# Patient Record
Sex: MALE | Race: BLACK OR AFRICAN AMERICAN | NOT HISPANIC OR LATINO | Employment: OTHER | URBAN - METROPOLITAN AREA
[De-identification: names, ages, dates, MRNs, and addresses within clinical notes are randomized per-mention and may not be internally consistent; named-entity substitution may affect disease eponyms.]

---

## 2017-01-06 DIAGNOSIS — I27.29 PULMONARY HYPERTENSIVE VENOUS DISEASE: Primary | ICD-10-CM

## 2017-01-09 ENCOUNTER — ANTI-COAG VISIT (OUTPATIENT)
Dept: CARDIOLOGY | Facility: CLINIC | Age: 42
End: 2017-01-09

## 2017-01-09 ENCOUNTER — INFUSION (OUTPATIENT)
Dept: CARDIOLOGY | Facility: HOSPITAL | Age: 42
End: 2017-01-09
Attending: INTERNAL MEDICINE
Payer: MEDICARE

## 2017-01-09 VITALS
DIASTOLIC BLOOD PRESSURE: 88 MMHG | HEART RATE: 89 BPM | BODY MASS INDEX: 48.5 KG/M2 | SYSTOLIC BLOOD PRESSURE: 129 MMHG | WEIGHT: 291.44 LBS | RESPIRATION RATE: 39 BRPM

## 2017-01-09 DIAGNOSIS — I27.9 CHRONIC CARDIOPULMONARY DISEASE: Primary | ICD-10-CM

## 2017-01-09 DIAGNOSIS — Z79.01 LONG TERM CURRENT USE OF ANTICOAGULANT THERAPY: ICD-10-CM

## 2017-01-09 LAB
ALBUMIN SERPL BCP-MCNC: 3 G/DL
ALP SERPL-CCNC: 97 U/L
ALT SERPL W/O P-5'-P-CCNC: 15 U/L
ANION GAP SERPL CALC-SCNC: 10 MMOL/L
AST SERPL-CCNC: 25 U/L
BILIRUB DIRECT SERPL-MCNC: 0.1 MG/DL
BILIRUB SERPL-MCNC: 0.4 MG/DL
BNP SERPL-MCNC: <10 PG/ML
BUN SERPL-MCNC: 24 MG/DL
CALCIUM SERPL-MCNC: 8.9 MG/DL
CHLORIDE SERPL-SCNC: 96 MMOL/L
CO2 SERPL-SCNC: 34 MMOL/L
CREAT SERPL-MCNC: 1.2 MG/DL
EST. GFR  (AFRICAN AMERICAN): >60 ML/MIN/1.73 M^2
EST. GFR  (NON AFRICAN AMERICAN): >60 ML/MIN/1.73 M^2
GLUCOSE SERPL-MCNC: 116 MG/DL
INR PPP: 2.7
MAGNESIUM SERPL-MCNC: 1.5 MG/DL
POTASSIUM SERPL-SCNC: 3.6 MMOL/L
PROT SERPL-MCNC: 7.9 G/DL
PROTHROMBIN TIME: 27.1 SEC
SODIUM SERPL-SCNC: 140 MMOL/L

## 2017-01-09 PROCEDURE — 36415 COLL VENOUS BLD VENIPUNCTURE: CPT

## 2017-01-09 PROCEDURE — 63600175 PHARM REV CODE 636 W HCPCS: Performed by: PHYSICIAN ASSISTANT

## 2017-01-09 PROCEDURE — 83735 ASSAY OF MAGNESIUM: CPT

## 2017-01-09 PROCEDURE — 25000003 PHARM REV CODE 250: Performed by: NURSE PRACTITIONER

## 2017-01-09 PROCEDURE — 63600175 PHARM REV CODE 636 W HCPCS: Performed by: NURSE PRACTITIONER

## 2017-01-09 PROCEDURE — 99213 OFFICE O/P EST LOW 20 MIN: CPT | Mod: ,,, | Performed by: INTERNAL MEDICINE

## 2017-01-09 PROCEDURE — 96365 THER/PROPH/DIAG IV INF INIT: CPT

## 2017-01-09 PROCEDURE — 80076 HEPATIC FUNCTION PANEL: CPT

## 2017-01-09 PROCEDURE — 96376 TX/PRO/DX INJ SAME DRUG ADON: CPT

## 2017-01-09 PROCEDURE — 85610 PROTHROMBIN TIME: CPT

## 2017-01-09 PROCEDURE — 25000003 PHARM REV CODE 250: Performed by: PHYSICIAN ASSISTANT

## 2017-01-09 PROCEDURE — 96366 THER/PROPH/DIAG IV INF ADDON: CPT

## 2017-01-09 PROCEDURE — 83880 ASSAY OF NATRIURETIC PEPTIDE: CPT

## 2017-01-09 PROCEDURE — 80048 BASIC METABOLIC PNL TOTAL CA: CPT

## 2017-01-09 RX ORDER — FUROSEMIDE 10 MG/ML
80 INJECTION INTRAMUSCULAR; INTRAVENOUS ONCE
Status: COMPLETED | OUTPATIENT
Start: 2017-01-09 | End: 2017-01-09

## 2017-01-09 RX ORDER — LANOLIN ALCOHOL/MO/W.PET/CERES
800 CREAM (GRAM) TOPICAL ONCE
Status: COMPLETED | OUTPATIENT
Start: 2017-01-09 | End: 2017-01-09

## 2017-01-09 RX ORDER — POTASSIUM CHLORIDE 750 MG/1
40 TABLET, EXTENDED RELEASE ORAL
Status: COMPLETED | OUTPATIENT
Start: 2017-01-09 | End: 2017-01-09

## 2017-01-09 RX ADMIN — MAGNESIUM OXIDE TAB 400 MG (241.3 MG ELEMENTAL MG) 800 MG: 400 (241.3 MG) TAB at 01:01

## 2017-01-09 RX ADMIN — FUROSEMIDE 20 MG/HR: 10 INJECTION, SOLUTION INTRAMUSCULAR; INTRAVENOUS at 08:01

## 2017-01-09 RX ADMIN — POTASSIUM CHLORIDE 40 MEQ: 750 TABLET, EXTENDED RELEASE ORAL at 12:01

## 2017-01-09 RX ADMIN — FUROSEMIDE 80 MG: 10 INJECTION, SOLUTION INTRAMUSCULAR; INTRAVENOUS at 08:01

## 2017-01-09 NOTE — PROGRESS NOTES
Discharge home,vss,neuro intact.Denies any acute distress at this time.See care plan note.Left floor ambulating in stable condition.Return in 2 weeks.Nadn.

## 2017-01-09 NOTE — PLAN OF CARE
Problem: Heart Failure (Adult)  Goal: Signs and Symptoms of Listed Potential Problems Will be Absent or Manageable (Heart Failure)  Signs and symptoms of listed potential problems will be absent or manageable by discharge/transition of care (reference Heart Failure (Adult) CPG).   Outcome: Ongoing (interventions implemented as appropriate)  Arrived in stable condition and voiced no concerns..Wt up by 2.6 kg since last visit on 12/12/16.Notified ALEXANDRE Adams NP of am labs.Supplement with Mag ox 800 mg and Kcl 40 mEq po x 1 dose.Tolerated infusion well.Urine out total 2775 ml.Instructed to add foods rich in potassium to diet.Verbalized he will eat a bake potato today.Return in 2 weeks

## 2017-01-20 DIAGNOSIS — I27.29 PULMONARY HYPERTENSIVE VENOUS DISEASE: Primary | ICD-10-CM

## 2017-01-23 ENCOUNTER — INFUSION (OUTPATIENT)
Dept: CARDIOLOGY | Facility: HOSPITAL | Age: 42
End: 2017-01-23
Attending: INTERNAL MEDICINE
Payer: MEDICARE

## 2017-01-23 VITALS
BODY MASS INDEX: 48.65 KG/M2 | DIASTOLIC BLOOD PRESSURE: 62 MMHG | SYSTOLIC BLOOD PRESSURE: 108 MMHG | WEIGHT: 292.31 LBS | RESPIRATION RATE: 21 BRPM | HEART RATE: 80 BPM | TEMPERATURE: 98 F | OXYGEN SATURATION: 97 %

## 2017-01-23 DIAGNOSIS — I27.9 CHRONIC CARDIOPULMONARY DISEASE: ICD-10-CM

## 2017-01-23 DIAGNOSIS — I27.9 CHRONIC PULMONARY HEART DISEASE: ICD-10-CM

## 2017-01-23 DIAGNOSIS — I27.0 PRIMARY PULMONARY HYPERTENSION: ICD-10-CM

## 2017-01-23 DIAGNOSIS — I27.29 PULMONARY HYPERTENSIVE VENOUS DISEASE: ICD-10-CM

## 2017-01-23 LAB
ANION GAP SERPL CALC-SCNC: 5 MMOL/L
BNP SERPL-MCNC: <10 PG/ML
BUN SERPL-MCNC: 28 MG/DL
CALCIUM SERPL-MCNC: 9.3 MG/DL
CHLORIDE SERPL-SCNC: 97 MMOL/L
CO2 SERPL-SCNC: 35 MMOL/L
CREAT SERPL-MCNC: 1.2 MG/DL
EST. GFR  (AFRICAN AMERICAN): >60 ML/MIN/1.73 M^2
EST. GFR  (NON AFRICAN AMERICAN): >60 ML/MIN/1.73 M^2
GLUCOSE SERPL-MCNC: 99 MG/DL
MAGNESIUM SERPL-MCNC: 2 MG/DL
POTASSIUM SERPL-SCNC: 4 MMOL/L
SODIUM SERPL-SCNC: 137 MMOL/L

## 2017-01-23 PROCEDURE — 80048 BASIC METABOLIC PNL TOTAL CA: CPT

## 2017-01-23 PROCEDURE — 96366 THER/PROPH/DIAG IV INF ADDON: CPT

## 2017-01-23 PROCEDURE — 63600175 PHARM REV CODE 636 W HCPCS

## 2017-01-23 PROCEDURE — 83735 ASSAY OF MAGNESIUM: CPT

## 2017-01-23 PROCEDURE — 96376 TX/PRO/DX INJ SAME DRUG ADON: CPT

## 2017-01-23 PROCEDURE — 36415 COLL VENOUS BLD VENIPUNCTURE: CPT

## 2017-01-23 PROCEDURE — 63600175 PHARM REV CODE 636 W HCPCS: Performed by: PHYSICIAN ASSISTANT

## 2017-01-23 PROCEDURE — 99214 OFFICE O/P EST MOD 30 MIN: CPT | Mod: ,,, | Performed by: INTERNAL MEDICINE

## 2017-01-23 PROCEDURE — 83880 ASSAY OF NATRIURETIC PEPTIDE: CPT

## 2017-01-23 PROCEDURE — 25000003 PHARM REV CODE 250

## 2017-01-23 PROCEDURE — 96365 THER/PROPH/DIAG IV INF INIT: CPT

## 2017-01-23 PROCEDURE — 99499 UNLISTED E&M SERVICE: CPT | Mod: ,,, | Performed by: PHYSICIAN ASSISTANT

## 2017-01-23 RX ORDER — FUROSEMIDE 10 MG/ML
80 INJECTION INTRAMUSCULAR; INTRAVENOUS ONCE
Status: COMPLETED | OUTPATIENT
Start: 2017-01-23 | End: 2017-01-23

## 2017-01-23 RX ADMIN — FUROSEMIDE 80 MG: 10 INJECTION, SOLUTION INTRAMUSCULAR; INTRAVENOUS at 08:01

## 2017-01-23 RX ADMIN — FUROSEMIDE: 10 INJECTION, SOLUTION INTRAMUSCULAR; INTRAVENOUS at 08:01

## 2017-01-23 NOTE — PROGRESS NOTES
Subjective:    Patient ID:  Yong Mcintyre is a 41 y.o. male who presents for follow-up of No chief complaint on file.      Congestive Heart Failure   Associated symptoms include shortness of breath.    36 yo AAM presents for biweekly outpatient IV diuretic tx. Weight is up today but Feeling well.         Review of Systems   Constitution: Positive for malaise/fatigue and weight gain.   HENT: Negative.    Eyes: Negative.    Cardiovascular: Positive for dyspnea on exertion and leg swelling.   Respiratory: Positive for shortness of breath and wheezing.    Endocrine: Negative.    Hematologic/Lymphatic: Negative.    Skin: Negative.    Musculoskeletal: Positive for arthritis and joint pain.   Gastrointestinal: Negative.    Genitourinary: Negative.    Neurological: Negative.    Psychiatric/Behavioral: Negative.    Allergic/Immunologic: Negative.         Objective:    Physical Exam   Constitutional: He is oriented to person, place, and time. He appears well-developed and well-nourished.   HENT:   Head: Normocephalic and atraumatic.   Eyes: Conjunctivae are normal. Pupils are equal, round, and reactive to light.   Neck: Normal range of motion. Neck supple. No thyromegaly present.   JVP mid neck   Cardiovascular: Normal rate, regular rhythm and normal heart sounds.    Pulmonary/Chest: Effort normal and breath sounds normal.   Abdominal: Soft. Bowel sounds are normal.   Musculoskeletal: He exhibits edema.   Neurological: He is alert and oriented to person, place, and time.   Skin: Skin is warm and dry.   Psychiatric: He has a normal mood and affect. His behavior is normal. Judgment and thought content normal.         Assessment:       1. Pulmonary hypertensive venous disease    2. Chronic pulmonary heart disease    3. Primary pulmonary hypertension    4. Chronic cardiopulmonary disease         Plan:       Lasix 80 mg IVP, then 20 mg/hr X 6 hours.  Followed by Dr. Head at [facility]  Continue biweekly outpatient  infusions.

## 2017-01-23 NOTE — PLAN OF CARE
Problem: Heart Failure (Adult)  Goal: Signs and Symptoms of Listed Potential Problems Will be Absent or Manageable (Heart Failure)  Signs and symptoms of listed potential problems will be absent or manageable by discharge/transition of care (reference Heart Failure (Adult) CPG).   Outcome: Ongoing (interventions implemented as appropriate)  Arrived in stable condition.Voices no concerns today.Orders given per KATHLEEN BROWN and carried out.Laxis 80 mg ivp followed by lasix drip @20 cc/hr x 6 hours.Notified PA of am labs and no pm labs ordered.Tolerated infusion well.Urine output total 1925 ml.Reported he has been monitoring closely his po intake (fluid/food) and  will start up with the gym today.Return in 2 weeks.

## 2017-01-23 NOTE — PROGRESS NOTES
Discharge home,vss,neuro intact.Denies any acute distress at this time.See care plan noted.Left floor ambulating in stable condition.Return in 2 weeks.Nadn.

## 2017-01-30 RX ORDER — WARFARIN SODIUM 5 MG/1
TABLET ORAL
Qty: 180 TABLET | Refills: 6 | Status: SHIPPED | OUTPATIENT
Start: 2017-01-30 | End: 2017-12-11 | Stop reason: SDUPTHER

## 2017-02-03 DIAGNOSIS — I27.29 PULMONARY HYPERTENSIVE VENOUS DISEASE: Primary | ICD-10-CM

## 2017-02-10 NOTE — PROGRESS NOTES
Subjective:    Patient ID:  Yong Mcintyre is a 41 y.o. male who presents for follow-up of No chief complaint on file.      Congestive Heart Failure   Associated symptoms include shortness of breath.   36 yo AAM presents for biweekly outpatient IV diuretic tx.  Feeling well without new c/o's.       Review of Systems   Constitution: Positive for malaise/fatigue and weight gain.   HENT: Negative.    Eyes: Negative.    Cardiovascular: Positive for dyspnea on exertion and leg swelling.   Respiratory: Positive for shortness of breath and wheezing.    Endocrine: Negative.    Hematologic/Lymphatic: Negative.    Skin: Negative.    Musculoskeletal: Positive for arthritis and joint pain.   Gastrointestinal: Negative.    Genitourinary: Negative.    Neurological: Negative.    Psychiatric/Behavioral: Negative.    Allergic/Immunologic: Negative.         Objective:    Physical Exam   Constitutional: He is oriented to person, place, and time. He appears well-developed and well-nourished.   HENT:   Head: Normocephalic and atraumatic.   Eyes: Conjunctivae are normal. Pupils are equal, round, and reactive to light.   Neck: Normal range of motion. Neck supple. No thyromegaly present.   JVP mid neck   Cardiovascular: Normal rate, regular rhythm and normal heart sounds.    Pulmonary/Chest: Effort normal and breath sounds normal.   Abdominal: Soft. Bowel sounds are normal.   Musculoskeletal: He exhibits edema.   Neurological: He is alert and oriented to person, place, and time.   Skin: Skin is warm and dry.   Psychiatric: He has a normal mood and affect. His behavior is normal. Judgment and thought content normal.         Assessment:       1. Chronic cardiopulmonary disease         Plan:       Lasix 80 mg IVP, then 20 mg/hr X 6 hours.  Followed by Dr. Head at [facility]  Continue biweekly outpatient infusions.

## 2017-02-17 DIAGNOSIS — I27.29 PULMONARY HYPERTENSIVE VENOUS DISEASE: Primary | ICD-10-CM

## 2017-02-20 ENCOUNTER — ANTI-COAG VISIT (OUTPATIENT)
Dept: CARDIOLOGY | Facility: CLINIC | Age: 42
End: 2017-02-20

## 2017-02-20 ENCOUNTER — INFUSION (OUTPATIENT)
Dept: CARDIOLOGY | Facility: HOSPITAL | Age: 42
End: 2017-02-20
Attending: INTERNAL MEDICINE
Payer: MEDICARE

## 2017-02-20 VITALS
HEART RATE: 73 BPM | OXYGEN SATURATION: 96 % | WEIGHT: 292.31 LBS | DIASTOLIC BLOOD PRESSURE: 73 MMHG | RESPIRATION RATE: 19 BRPM | TEMPERATURE: 98 F | SYSTOLIC BLOOD PRESSURE: 115 MMHG | BODY MASS INDEX: 48.65 KG/M2

## 2017-02-20 DIAGNOSIS — I27.9: ICD-10-CM

## 2017-02-20 DIAGNOSIS — Z79.01 LONG TERM (CURRENT) USE OF ANTICOAGULANTS: ICD-10-CM

## 2017-02-20 DIAGNOSIS — I27.0 PRIMARY PULMONARY HYPERTENSION: ICD-10-CM

## 2017-02-20 DIAGNOSIS — I27.9 CHRONIC PULMONARY HEART DISEASE: Primary | ICD-10-CM

## 2017-02-20 DIAGNOSIS — I27.9 CHRONIC CARDIOPULMONARY DISEASE: ICD-10-CM

## 2017-02-20 DIAGNOSIS — Z79.01 LONG TERM CURRENT USE OF ANTICOAGULANT THERAPY: ICD-10-CM

## 2017-02-20 LAB
ALBUMIN SERPL BCP-MCNC: 3.4 G/DL
ALP SERPL-CCNC: 124 U/L
ALT SERPL W/O P-5'-P-CCNC: 22 U/L
ANION GAP SERPL CALC-SCNC: 8 MMOL/L
AST SERPL-CCNC: 35 U/L
BILIRUB DIRECT SERPL-MCNC: 0.2 MG/DL
BILIRUB SERPL-MCNC: 0.7 MG/DL
BNP SERPL-MCNC: <10 PG/ML
BUN SERPL-MCNC: 21 MG/DL
CALCIUM SERPL-MCNC: 9.1 MG/DL
CHLORIDE SERPL-SCNC: 99 MMOL/L
CO2 SERPL-SCNC: 31 MMOL/L
CREAT SERPL-MCNC: 1.1 MG/DL
EST. GFR  (AFRICAN AMERICAN): >60 ML/MIN/1.73 M^2
EST. GFR  (NON AFRICAN AMERICAN): >60 ML/MIN/1.73 M^2
GLUCOSE SERPL-MCNC: 94 MG/DL
INR PPP: 1.7
MAGNESIUM SERPL-MCNC: 1.7 MG/DL
POTASSIUM SERPL-SCNC: 3.9 MMOL/L
PROT SERPL-MCNC: 8.7 G/DL
PROTHROMBIN TIME: 16.9 SEC
SODIUM SERPL-SCNC: 138 MMOL/L

## 2017-02-20 PROCEDURE — 63600175 PHARM REV CODE 636 W HCPCS: Performed by: NURSE PRACTITIONER

## 2017-02-20 PROCEDURE — 80076 HEPATIC FUNCTION PANEL: CPT

## 2017-02-20 PROCEDURE — 96376 TX/PRO/DX INJ SAME DRUG ADON: CPT

## 2017-02-20 PROCEDURE — 83735 ASSAY OF MAGNESIUM: CPT

## 2017-02-20 PROCEDURE — 96365 THER/PROPH/DIAG IV INF INIT: CPT

## 2017-02-20 PROCEDURE — 85610 PROTHROMBIN TIME: CPT

## 2017-02-20 PROCEDURE — 80048 BASIC METABOLIC PNL TOTAL CA: CPT

## 2017-02-20 PROCEDURE — 83880 ASSAY OF NATRIURETIC PEPTIDE: CPT

## 2017-02-20 PROCEDURE — 25000003 PHARM REV CODE 250: Performed by: NURSE PRACTITIONER

## 2017-02-20 PROCEDURE — 96366 THER/PROPH/DIAG IV INF ADDON: CPT

## 2017-02-20 PROCEDURE — 25000003 PHARM REV CODE 250: Performed by: PHYSICIAN ASSISTANT

## 2017-02-20 PROCEDURE — 99499 UNLISTED E&M SERVICE: CPT | Mod: ,,, | Performed by: NURSE PRACTITIONER

## 2017-02-20 PROCEDURE — 63600175 PHARM REV CODE 636 W HCPCS: Performed by: PHYSICIAN ASSISTANT

## 2017-02-20 PROCEDURE — 99213 OFFICE O/P EST LOW 20 MIN: CPT | Mod: ,,, | Performed by: NURSE PRACTITIONER

## 2017-02-20 RX ORDER — LANOLIN ALCOHOL/MO/W.PET/CERES
400 CREAM (GRAM) TOPICAL ONCE
Status: COMPLETED | OUTPATIENT
Start: 2017-02-20 | End: 2017-02-20

## 2017-02-20 RX ORDER — FUROSEMIDE 10 MG/ML
80 INJECTION INTRAMUSCULAR; INTRAVENOUS ONCE
Status: COMPLETED | OUTPATIENT
Start: 2017-02-20 | End: 2017-02-20

## 2017-02-20 RX ADMIN — MAGNESIUM OXIDE TAB 400 MG (241.3 MG ELEMENTAL MG) 400 MG: 400 (241.3 MG) TAB at 11:02

## 2017-02-20 RX ADMIN — FUROSEMIDE 80 MG: 10 INJECTION, SOLUTION INTRAMUSCULAR; INTRAVENOUS at 08:02

## 2017-02-20 RX ADMIN — FUROSEMIDE 20 MG/HR: 10 INJECTION, SOLUTION INTRAMUSCULAR; INTRAVENOUS at 08:02

## 2017-02-20 NOTE — PLAN OF CARE
Problem: Heart Failure (Adult)  Goal: Signs and Symptoms of Listed Potential Problems Will be Absent or Manageable (Heart Failure)  Signs and symptoms of listed potential problems will be absent or manageable by discharge/transition of care (reference Heart Failure (Adult) CPG).   Outcome: Ongoing (interventions implemented as appropriate)  Tolerated infusion well.Urinary out total 2050 ml.Denies any acute distress at this time.Return in 2 weeks.

## 2017-02-20 NOTE — PROGRESS NOTES
Discharge hoe vss,neuro intac.Denies any acute distress at this time.See care plan notes.Left floor ambulating in stable condition Eye exams & tests to be performed upon return.Return in 2 weeks.

## 2017-02-20 NOTE — PROGRESS NOTES
"Subjective:    Patient ID:  Yong Mcintyre is a 41 y.o. male who presents for follow-up of No chief complaint on file.      Congestive Heart Failure   Associated symptoms include shortness of breath.    38 yo AAM presents for biweekly outpatient IV diuretic tx. States that he has been "in a funk" lately but is starting to get back to the gym.        Review of Systems   Constitution: Positive for malaise/fatigue and weight gain.   HENT: Negative.    Eyes: Negative.    Cardiovascular: Positive for dyspnea on exertion and leg swelling.   Respiratory: Positive for shortness of breath and wheezing.    Endocrine: Negative.    Hematologic/Lymphatic: Negative.    Skin: Negative.    Musculoskeletal: Positive for arthritis and joint pain.   Gastrointestinal: Negative.    Genitourinary: Negative.    Neurological: Negative.    Psychiatric/Behavioral: Negative.    Allergic/Immunologic: Negative.         Objective:    Physical Exam   Constitutional: He is oriented to person, place, and time. He appears well-developed and well-nourished.   HENT:   Head: Normocephalic and atraumatic.   Eyes: Conjunctivae are normal. Pupils are equal, round, and reactive to light.   Neck: Normal range of motion. Neck supple. No thyromegaly present.   JVP mid neck   Cardiovascular: Normal rate, regular rhythm and normal heart sounds.    Pulmonary/Chest: Effort normal and breath sounds normal.   Abdominal: Soft. Bowel sounds are normal.   Musculoskeletal: He exhibits edema.   Neurological: He is alert and oriented to person, place, and time.   Skin: Skin is warm and dry.   Psychiatric: He has a normal mood and affect. His behavior is normal. Judgment and thought content normal.         Assessment:       1. Long term (current) use of anticoagulants    2. Chronic pulmonary heart disease    3. Primary pulmonary hypertension    4. Chronic cardiopulmonary disease         Plan:       Lasix 80 mg IVP, then 20 mg/hr X 6 hours.  Followed by Dr. Head " at [facility]  Continue biweekly outpatient infusions.

## 2017-02-20 NOTE — PLAN OF CARE
Problem: Heart Failure (Adult)  Goal: Signs and Symptoms of Listed Potential Problems Will be Absent or Manageable (Heart Failure)  Signs and symptoms of listed potential problems will be absent or manageable by discharge/transition of care (reference Heart Failure (Adult) CPG).   Outcome: Ongoing (interventions implemented as appropriate)  Arrived in stable condition.missed last visit and wt up by 2.4 kg.S Bryan NP here and orders given and carried out.Lasix 80 mg ivp followed by Lasix drip @20 cc/hr X 6 hr.reported restarted the Gym program.Reported am labs to NP and orders given and carried out.Mag ox 400 mg x 1 dose.Denies any acute distress at this time.will continue to monitor

## 2017-03-03 DIAGNOSIS — I27.29 PULMONARY HYPERTENSIVE VENOUS DISEASE: Primary | ICD-10-CM

## 2017-03-17 DIAGNOSIS — I27.9: Primary | ICD-10-CM

## 2017-03-20 ENCOUNTER — INFUSION (OUTPATIENT)
Dept: CARDIOLOGY | Facility: HOSPITAL | Age: 42
End: 2017-03-20
Attending: INTERNAL MEDICINE
Payer: MEDICARE

## 2017-03-20 ENCOUNTER — ANTI-COAG VISIT (OUTPATIENT)
Dept: CARDIOLOGY | Facility: CLINIC | Age: 42
End: 2017-03-20

## 2017-03-20 VITALS
SYSTOLIC BLOOD PRESSURE: 121 MMHG | DIASTOLIC BLOOD PRESSURE: 62 MMHG | OXYGEN SATURATION: 93 % | RESPIRATION RATE: 19 BRPM | WEIGHT: 309.75 LBS | BODY MASS INDEX: 51.54 KG/M2 | TEMPERATURE: 99 F | HEART RATE: 87 BPM

## 2017-03-20 DIAGNOSIS — Z79.01 LONG TERM CURRENT USE OF ANTICOAGULANT THERAPY: ICD-10-CM

## 2017-03-20 DIAGNOSIS — I27.20 PULMONARY HYPERTENSION: Primary | ICD-10-CM

## 2017-03-20 DIAGNOSIS — I27.9 CHRONIC CARDIOPULMONARY DISEASE: ICD-10-CM

## 2017-03-20 DIAGNOSIS — I27.9: ICD-10-CM

## 2017-03-20 DIAGNOSIS — Z79.01 LONG TERM (CURRENT) USE OF ANTICOAGULANTS: ICD-10-CM

## 2017-03-20 DIAGNOSIS — I27.81 COR PULMONALE: ICD-10-CM

## 2017-03-20 DIAGNOSIS — I27.9 CHRONIC PULMONARY HEART DISEASE: ICD-10-CM

## 2017-03-20 DIAGNOSIS — I27.0 PRIMARY PULMONARY HYPERTENSION: ICD-10-CM

## 2017-03-20 LAB
ALBUMIN SERPL BCP-MCNC: 2.9 G/DL
ALP SERPL-CCNC: 122 U/L
ALT SERPL W/O P-5'-P-CCNC: 20 U/L
ANION GAP SERPL CALC-SCNC: 10 MMOL/L
ANION GAP SERPL CALC-SCNC: 10 MMOL/L
AST SERPL-CCNC: 27 U/L
BILIRUB DIRECT SERPL-MCNC: 0.2 MG/DL
BILIRUB SERPL-MCNC: 0.4 MG/DL
BNP SERPL-MCNC: 10 PG/ML
BUN SERPL-MCNC: 18 MG/DL
BUN SERPL-MCNC: 18 MG/DL
CALCIUM SERPL-MCNC: 8.8 MG/DL
CALCIUM SERPL-MCNC: 8.9 MG/DL
CHLORIDE SERPL-SCNC: 97 MMOL/L
CHLORIDE SERPL-SCNC: 99 MMOL/L
CO2 SERPL-SCNC: 31 MMOL/L
CO2 SERPL-SCNC: 32 MMOL/L
CREAT SERPL-MCNC: 1 MG/DL
CREAT SERPL-MCNC: 1.2 MG/DL
EST. GFR  (AFRICAN AMERICAN): >60 ML/MIN/1.73 M^2
EST. GFR  (AFRICAN AMERICAN): >60 ML/MIN/1.73 M^2
EST. GFR  (NON AFRICAN AMERICAN): >60 ML/MIN/1.73 M^2
EST. GFR  (NON AFRICAN AMERICAN): >60 ML/MIN/1.73 M^2
GLUCOSE SERPL-MCNC: 100 MG/DL
GLUCOSE SERPL-MCNC: 106 MG/DL
INR PPP: 2.1
MAGNESIUM SERPL-MCNC: 1.7 MG/DL
MAGNESIUM SERPL-MCNC: 1.7 MG/DL
POTASSIUM SERPL-SCNC: 3.7 MMOL/L
POTASSIUM SERPL-SCNC: 3.8 MMOL/L
PROT SERPL-MCNC: 7.9 G/DL
PROTHROMBIN TIME: 21.5 SEC
SODIUM SERPL-SCNC: 139 MMOL/L
SODIUM SERPL-SCNC: 140 MMOL/L

## 2017-03-20 PROCEDURE — 99214 OFFICE O/P EST MOD 30 MIN: CPT | Mod: ,,, | Performed by: INTERNAL MEDICINE

## 2017-03-20 PROCEDURE — 80048 BASIC METABOLIC PNL TOTAL CA: CPT

## 2017-03-20 PROCEDURE — 83880 ASSAY OF NATRIURETIC PEPTIDE: CPT

## 2017-03-20 PROCEDURE — 99499 UNLISTED E&M SERVICE: CPT | Mod: ,,, | Performed by: PHYSICIAN ASSISTANT

## 2017-03-20 PROCEDURE — 96376 TX/PRO/DX INJ SAME DRUG ADON: CPT

## 2017-03-20 PROCEDURE — 80076 HEPATIC FUNCTION PANEL: CPT

## 2017-03-20 PROCEDURE — 63600175 PHARM REV CODE 636 W HCPCS: Performed by: NURSE PRACTITIONER

## 2017-03-20 PROCEDURE — 96365 THER/PROPH/DIAG IV INF INIT: CPT

## 2017-03-20 PROCEDURE — 63600175 PHARM REV CODE 636 W HCPCS: Performed by: PHYSICIAN ASSISTANT

## 2017-03-20 PROCEDURE — 96366 THER/PROPH/DIAG IV INF ADDON: CPT

## 2017-03-20 PROCEDURE — 83735 ASSAY OF MAGNESIUM: CPT | Mod: 91

## 2017-03-20 PROCEDURE — 25000003 PHARM REV CODE 250: Performed by: NURSE PRACTITIONER

## 2017-03-20 PROCEDURE — 85610 PROTHROMBIN TIME: CPT

## 2017-03-20 RX ORDER — FUROSEMIDE 10 MG/ML
80 INJECTION INTRAMUSCULAR; INTRAVENOUS ONCE
Status: COMPLETED | OUTPATIENT
Start: 2017-03-20 | End: 2017-03-20

## 2017-03-20 RX ADMIN — FUROSEMIDE 80 MG: 10 INJECTION, SOLUTION INTRAMUSCULAR; INTRAVENOUS at 08:03

## 2017-03-20 RX ADMIN — FUROSEMIDE 20 MG/HR: 10 INJECTION, SOLUTION INTRAMUSCULAR; INTRAVENOUS at 08:03

## 2017-03-20 NOTE — PLAN OF CARE
Problem: Heart Failure (Adult)  Goal: Signs and Symptoms of Listed Potential Problems Will be Absent or Manageable (Heart Failure)  Signs and symptoms of listed potential problems will be absent or manageable by discharge/transition of care (reference Heart Failure (Adult) CPG).   Outcome: Ongoing (interventions implemented as appropriate)  Pt returned call and stated he will eat a banana and take mag ox 500 mg.

## 2017-03-20 NOTE — PLAN OF CARE
Problem: Heart Failure (Adult)  Goal: Signs and Symptoms of Listed Potential Problems Will be Absent or Manageable (Heart Failure)  Signs and symptoms of listed potential problems will be absent or manageable by discharge/transition of care (reference Heart Failure (Adult) CPG).   Outcome: Ongoing (interventions implemented as appropriate)  Missed last 2 visits.Arrived ambulating in stable condition.Reported having sinus drip,headaches,generalized arching and  low grade temp over 99.0-100.8  For about 1 week.Started feeling better over the weekend.He also reported doing spring cleaning over weekend.Orders given per D Rolling PA and carried out.Lasix 80 mg ivp followed by lasix drip @20 cc/hr x 6 hrs.PA here and notified of am labs and urine  output.Evening labs completed and pt left before labs are verified.Notified PA with evening labs and instructed to add foods rich in potassium and take extra  home dose of Mag ox 400 mg.Orders carried out by leaving  message on cell phone @[phone].Also instructed to call [facility] with any question.Return in 2 weeks.

## 2017-03-20 NOTE — PROGRESS NOTES
Subjective:    Patient ID:  Yong Mcintyre is a 41 y.o. male who presents for follow-up of CHF.      HPI  36 yo AAM presents for biweekly outpatient IV diuretic tx. Pt was congested and felt like his allergies were acting up last week. He had some chills and a low grade fever a few days ago, 99.8. This is better and today he just feels fatigued. No worsening SOB, GOMEZ.    Review of Systems   Constitution: Positive for malaise/fatigue and weight gain.   Cardiovascular: Positive for dyspnea on exertion and leg swelling.   Respiratory: Positive for shortness of breath. Negative for cough.    Gastrointestinal: Negative for nausea.   Neurological: Negative for dizziness and light-headedness.        Objective:    Physical Exam   Constitutional: He is oriented to person, place, and time. He appears well-developed and well-nourished.   /62 (BP Location: Right arm, Patient Position: Sitting, BP Method: Automatic)  Pulse 87  Temp 98.5 °F (36.9 °C) (Oral)   Resp 19  Wt (!) 140.5 kg (309 lb 11.9 oz)  SpO2 (!) 93%  BMI 51.54 kg/m2     Neck: No JVD present.   Cardiovascular: Normal rate, regular rhythm and normal heart sounds.    Pulses:       Radial pulses are 2+ on the right side, and 2+ on the left side.   Pulmonary/Chest: Effort normal and breath sounds normal.   Abdominal: Soft. Bowel sounds are normal. There is no tenderness.   Musculoskeletal: He exhibits no edema.   Neurological: He is alert and oriented to person, place, and time.   Skin: Skin is warm and dry.         Assessment:       1. Pulmonary hypertension    2. Chronic pulmonary heart disease    3. Primary pulmonary hypertension    4. Chronic cardiopulmonary disease    5. Cor pulmonale    6. Long term (current) use of anticoagulants         Plan:       Lasix 80 mg IVP, then 20 mg/hr X 6 hours.  Followed by Dr. Head at [facility]  Continue biweekly outpatient infusions.

## 2017-03-20 NOTE — PROGRESS NOTES
Discharge home in stable condition,vss,neuro intact.Denies any acute distress at this time.See care plan note.Refuses w/c and left floor ambulating.Return in 2 weeks.Huan

## 2017-03-31 DIAGNOSIS — I27.29 PULMONARY HYPERTENSIVE VENOUS DISEASE: Primary | ICD-10-CM

## 2017-04-13 DIAGNOSIS — I27.29 PULMONARY HYPERTENSIVE VENOUS DISEASE: Primary | ICD-10-CM

## 2017-04-17 ENCOUNTER — ANTI-COAG VISIT (OUTPATIENT)
Dept: CARDIOLOGY | Facility: CLINIC | Age: 42
End: 2017-04-17

## 2017-04-17 ENCOUNTER — INFUSION (OUTPATIENT)
Dept: CARDIOLOGY | Facility: HOSPITAL | Age: 42
End: 2017-04-17
Attending: INTERNAL MEDICINE
Payer: MEDICARE

## 2017-04-17 VITALS
RESPIRATION RATE: 21 BRPM | TEMPERATURE: 99 F | OXYGEN SATURATION: 96 % | HEART RATE: 74 BPM | SYSTOLIC BLOOD PRESSURE: 126 MMHG | DIASTOLIC BLOOD PRESSURE: 78 MMHG | WEIGHT: 309.31 LBS | BODY MASS INDEX: 51.47 KG/M2

## 2017-04-17 DIAGNOSIS — Z79.01 LONG TERM (CURRENT) USE OF ANTICOAGULANTS: ICD-10-CM

## 2017-04-17 DIAGNOSIS — I27.0 PRIMARY PULMONARY HYPERTENSION: ICD-10-CM

## 2017-04-17 DIAGNOSIS — G47.33 OSA (OBSTRUCTIVE SLEEP APNEA): ICD-10-CM

## 2017-04-17 DIAGNOSIS — I51.89 DIASTOLIC DYSFUNCTION: ICD-10-CM

## 2017-04-17 DIAGNOSIS — E66.2 PICKWICKIAN SYNDROME: ICD-10-CM

## 2017-04-17 DIAGNOSIS — I27.20 PULMONARY HYPERTENSION: Primary | ICD-10-CM

## 2017-04-17 DIAGNOSIS — Z79.01 LONG TERM CURRENT USE OF ANTICOAGULANT THERAPY: ICD-10-CM

## 2017-04-17 DIAGNOSIS — J96.20 ACUTE ON CHRONIC RESPIRATORY FAILURE, UNSPECIFIED WHETHER WITH HYPOXIA OR HYPERCAPNIA: ICD-10-CM

## 2017-04-17 DIAGNOSIS — Q21.12 PFO (PATENT FORAMEN OVALE): ICD-10-CM

## 2017-04-17 DIAGNOSIS — I27.9 CHRONIC PULMONARY HEART DISEASE: ICD-10-CM

## 2017-04-17 DIAGNOSIS — I48.0 PAROXYSMAL ATRIAL FIBRILLATION: ICD-10-CM

## 2017-04-17 DIAGNOSIS — E66.01 MORBID OBESITY DUE TO EXCESS CALORIES: ICD-10-CM

## 2017-04-17 DIAGNOSIS — I27.9 CHRONIC CARDIOPULMONARY DISEASE: ICD-10-CM

## 2017-04-17 LAB
ALBUMIN SERPL BCP-MCNC: 3.2 G/DL
ALP SERPL-CCNC: 132 U/L
ALT SERPL W/O P-5'-P-CCNC: 21 U/L
ANION GAP SERPL CALC-SCNC: 9 MMOL/L
AST SERPL-CCNC: 29 U/L
BILIRUB DIRECT SERPL-MCNC: 0.1 MG/DL
BILIRUB SERPL-MCNC: 0.3 MG/DL
BNP SERPL-MCNC: <10 PG/ML
BUN SERPL-MCNC: 26 MG/DL
CALCIUM SERPL-MCNC: 8.7 MG/DL
CHLORIDE SERPL-SCNC: 98 MMOL/L
CO2 SERPL-SCNC: 32 MMOL/L
CREAT SERPL-MCNC: 1.1 MG/DL
EST. GFR  (AFRICAN AMERICAN): >60 ML/MIN/1.73 M^2
EST. GFR  (NON AFRICAN AMERICAN): >60 ML/MIN/1.73 M^2
GLUCOSE SERPL-MCNC: 88 MG/DL
INR PPP: 2.2
MAGNESIUM SERPL-MCNC: 2.1 MG/DL
POTASSIUM SERPL-SCNC: 4.1 MMOL/L
PROT SERPL-MCNC: 8.6 G/DL
PROTHROMBIN TIME: 22.3 SEC
SODIUM SERPL-SCNC: 139 MMOL/L

## 2017-04-17 PROCEDURE — 63600175 PHARM REV CODE 636 W HCPCS: Performed by: PHYSICIAN ASSISTANT

## 2017-04-17 PROCEDURE — 83880 ASSAY OF NATRIURETIC PEPTIDE: CPT

## 2017-04-17 PROCEDURE — 96366 THER/PROPH/DIAG IV INF ADDON: CPT

## 2017-04-17 PROCEDURE — 80048 BASIC METABOLIC PNL TOTAL CA: CPT

## 2017-04-17 PROCEDURE — 99213 OFFICE O/P EST LOW 20 MIN: CPT | Mod: ,,, | Performed by: NURSE PRACTITIONER

## 2017-04-17 PROCEDURE — 96376 TX/PRO/DX INJ SAME DRUG ADON: CPT

## 2017-04-17 PROCEDURE — 63600175 PHARM REV CODE 636 W HCPCS: Performed by: NURSE PRACTITIONER

## 2017-04-17 PROCEDURE — 25000003 PHARM REV CODE 250: Performed by: PHYSICIAN ASSISTANT

## 2017-04-17 PROCEDURE — 83735 ASSAY OF MAGNESIUM: CPT

## 2017-04-17 PROCEDURE — 80076 HEPATIC FUNCTION PANEL: CPT

## 2017-04-17 PROCEDURE — 96365 THER/PROPH/DIAG IV INF INIT: CPT

## 2017-04-17 PROCEDURE — 99499 UNLISTED E&M SERVICE: CPT | Mod: ,,, | Performed by: NURSE PRACTITIONER

## 2017-04-17 PROCEDURE — 85610 PROTHROMBIN TIME: CPT

## 2017-04-17 RX ORDER — METOPROLOL TARTRATE 25 MG/1
25 TABLET, FILM COATED ORAL 2 TIMES DAILY
Qty: 180 TABLET | Refills: 3 | Status: SHIPPED | OUTPATIENT
Start: 2017-04-17 | End: 2018-06-25 | Stop reason: SDUPTHER

## 2017-04-17 RX ORDER — FUROSEMIDE 10 MG/ML
80 INJECTION INTRAMUSCULAR; INTRAVENOUS ONCE
Status: COMPLETED | OUTPATIENT
Start: 2017-04-17 | End: 2017-04-17

## 2017-04-17 RX ADMIN — FUROSEMIDE 20 MG/HR: 10 INJECTION, SOLUTION INTRAMUSCULAR; INTRAVENOUS at 08:04

## 2017-04-17 RX ADMIN — FUROSEMIDE 80 MG: 10 INJECTION, SOLUTION INTRAMUSCULAR; INTRAVENOUS at 08:04

## 2017-04-17 NOTE — PROGRESS NOTES
Subjective:    Patient ID:  Yong Mcintyre is a 41 y.o. male who presents for follow-up of Congestive Heart Failure      Congestive Heart Failure      40 yo AAM presents for biweekly outpatient IV diuretic tx. He remains on Lasix 80 mg bid, Tracleer and Revatio and is on home O2 at 3 LPM. Has gained ~ 80# over the last year 2/2 dietary indiscretion and not going to the gym. Sees Dr. Head at [facility] once a year.    Review of Systems   Constitution: Positive for malaise/fatigue and weight gain.   HENT: Negative.    Eyes: Negative.    Cardiovascular: Positive for dyspnea on exertion and leg swelling.   Respiratory: Positive for shortness of breath and wheezing.    Endocrine: Negative.    Hematologic/Lymphatic: Negative.    Skin: Negative.    Musculoskeletal: Positive for arthritis and joint pain.   Gastrointestinal: Negative.    Genitourinary: Negative.    Neurological: Negative.    Psychiatric/Behavioral: Negative.    Allergic/Immunologic: Negative.         Objective:    Physical Exam   Constitutional: He is oriented to person, place, and time. He appears well-developed and well-nourished.   HENT:   Head: Normocephalic and atraumatic.   Eyes: Conjunctivae are normal. Pupils are equal, round, and reactive to light.   Neck: Normal range of motion. Neck supple. No thyromegaly present.   JVP mid neck   Cardiovascular: Normal rate, regular rhythm and normal heart sounds.    Pulmonary/Chest: Effort normal and breath sounds normal.   Abdominal: Soft. Bowel sounds are normal.   Musculoskeletal: He exhibits edema.   Neurological: He is alert and oriented to person, place, and time.   Skin: Skin is warm and dry.   Psychiatric: He has a normal mood and affect. His behavior is normal. Judgment and thought content normal.         Assessment:       1. Pulmonary hypertension    2. Chronic pulmonary heart disease    3. Primary pulmonary hypertension    4. Chronic cardiopulmonary disease    5. Long term (current) use of  anticoagulants    6. Acute on chronic respiratory failure, unspecified whether with hypoxia or hypercapnia    7. PFO (patent foramen ovale)    8. Paroxysmal atrial fibrillation    9. MALLIKA (obstructive sleep apnea)    10. Pickwickian syndrome    11. Morbid obesity due to excess calories    12. Diastolic dysfunction         Plan:       Lasix 80 mg IVP, then 20 mg/hr X 6 hours.  Followed by Dr. Head at [facility].  Continue biweekly outpatient infusions.

## 2017-04-17 NOTE — PROGRESS NOTES
Discharge home,vss,neuro intact.Denies any acute distress at this time.See care plan note.Left floor ambulating in stable condition.Return in 2 weeks.

## 2017-04-17 NOTE — PLAN OF CARE
Problem: Heart Failure (Adult)  Goal: Signs and Symptoms of Listed Potential Problems Will be Absent or Manageable (Heart Failure)  Signs and symptoms of listed potential problems will be absent or manageable by discharge/transition of care (reference Heart Failure (Adult) CPG).   Outcome: Ongoing (interventions implemented as appropriate)  Arrived in stable condition.Denies any acute distress at this time.Missed last infusion and wt up 2.6 kg.Orders given per GRIFFIN Rooney NP and carried out.Lasix 80 mg ivp followed by lasix drip @20 cc/hr x 6 hours.Notified NP of am labs and no pm labs ordered.Feels like these infusions are help.He started back going to the gym and want to lose weight now.Tolerated infusion well.Urinary output 2625 cc with net -1947.Return in 2 weeks .

## 2017-04-27 RX ORDER — FLUTICASONE PROPIONATE AND SALMETEROL 50; 250 UG/1; UG/1
POWDER RESPIRATORY (INHALATION)
Qty: 60 EACH | Refills: 3 | Status: SHIPPED | OUTPATIENT
Start: 2017-04-27 | End: 2017-09-10 | Stop reason: SDUPTHER

## 2017-04-28 DIAGNOSIS — I27.29 PULMONARY HYPERTENSIVE VENOUS DISEASE: Primary | ICD-10-CM

## 2017-05-01 ENCOUNTER — INFUSION (OUTPATIENT)
Dept: CARDIOLOGY | Facility: HOSPITAL | Age: 42
End: 2017-05-01
Attending: INTERNAL MEDICINE
Payer: MEDICARE

## 2017-05-01 VITALS
DIASTOLIC BLOOD PRESSURE: 72 MMHG | BODY MASS INDEX: 51.29 KG/M2 | HEART RATE: 88 BPM | WEIGHT: 308.19 LBS | SYSTOLIC BLOOD PRESSURE: 116 MMHG | RESPIRATION RATE: 22 BRPM

## 2017-05-01 DIAGNOSIS — Z79.01 LONG TERM (CURRENT) USE OF ANTICOAGULANTS: ICD-10-CM

## 2017-05-01 DIAGNOSIS — I27.9 CHRONIC PULMONARY HEART DISEASE: ICD-10-CM

## 2017-05-01 DIAGNOSIS — I27.9 CHRONIC CARDIOPULMONARY DISEASE: ICD-10-CM

## 2017-05-01 DIAGNOSIS — I27.0 PRIMARY PULMONARY HYPERTENSION: ICD-10-CM

## 2017-05-01 LAB
ANION GAP SERPL CALC-SCNC: 11 MMOL/L
BNP SERPL-MCNC: <10 PG/ML
BUN SERPL-MCNC: 29 MG/DL
CALCIUM SERPL-MCNC: 9.5 MG/DL
CHLORIDE SERPL-SCNC: 94 MMOL/L
CO2 SERPL-SCNC: 32 MMOL/L
CREAT SERPL-MCNC: 1.2 MG/DL
EST. GFR  (AFRICAN AMERICAN): >60 ML/MIN/1.73 M^2
EST. GFR  (NON AFRICAN AMERICAN): >60 ML/MIN/1.73 M^2
GLUCOSE SERPL-MCNC: 94 MG/DL
INR PPP: 2.8
MAGNESIUM SERPL-MCNC: 1.7 MG/DL
POTASSIUM SERPL-SCNC: 3.5 MMOL/L
PROTHROMBIN TIME: 28.5 SEC
SODIUM SERPL-SCNC: 137 MMOL/L

## 2017-05-01 PROCEDURE — 96366 THER/PROPH/DIAG IV INF ADDON: CPT

## 2017-05-01 PROCEDURE — 99214 OFFICE O/P EST MOD 30 MIN: CPT | Mod: ,,, | Performed by: INTERNAL MEDICINE

## 2017-05-01 PROCEDURE — 36415 COLL VENOUS BLD VENIPUNCTURE: CPT

## 2017-05-01 PROCEDURE — 83880 ASSAY OF NATRIURETIC PEPTIDE: CPT

## 2017-05-01 PROCEDURE — 96376 TX/PRO/DX INJ SAME DRUG ADON: CPT

## 2017-05-01 PROCEDURE — 83735 ASSAY OF MAGNESIUM: CPT

## 2017-05-01 PROCEDURE — 25000003 PHARM REV CODE 250: Performed by: NURSE PRACTITIONER

## 2017-05-01 PROCEDURE — 80048 BASIC METABOLIC PNL TOTAL CA: CPT

## 2017-05-01 PROCEDURE — 85610 PROTHROMBIN TIME: CPT

## 2017-05-01 PROCEDURE — 63600175 PHARM REV CODE 636 W HCPCS: Performed by: NURSE PRACTITIONER

## 2017-05-01 PROCEDURE — 25000003 PHARM REV CODE 250: Performed by: PHYSICIAN ASSISTANT

## 2017-05-01 PROCEDURE — 96365 THER/PROPH/DIAG IV INF INIT: CPT

## 2017-05-01 PROCEDURE — 99499 UNLISTED E&M SERVICE: CPT | Mod: ,,, | Performed by: PHYSICIAN ASSISTANT

## 2017-05-01 RX ORDER — FUROSEMIDE 10 MG/ML
80 INJECTION INTRAMUSCULAR; INTRAVENOUS ONCE
Status: COMPLETED | OUTPATIENT
Start: 2017-05-01 | End: 2017-05-01

## 2017-05-01 RX ORDER — POTASSIUM CHLORIDE 750 MG/1
40 TABLET, EXTENDED RELEASE ORAL
Status: COMPLETED | OUTPATIENT
Start: 2017-05-01 | End: 2017-05-01

## 2017-05-01 RX ORDER — LANOLIN ALCOHOL/MO/W.PET/CERES
400 CREAM (GRAM) TOPICAL ONCE
Status: COMPLETED | OUTPATIENT
Start: 2017-05-01 | End: 2017-05-01

## 2017-05-01 RX ADMIN — Medication 400 MG: at 10:05

## 2017-05-01 RX ADMIN — FUROSEMIDE 20 MG/HR: 10 INJECTION, SOLUTION INTRAMUSCULAR; INTRAVENOUS at 08:05

## 2017-05-01 RX ADMIN — POTASSIUM CHLORIDE 40 MEQ: 750 TABLET, EXTENDED RELEASE ORAL at 10:05

## 2017-05-01 RX ADMIN — FUROSEMIDE 80 MG: 10 INJECTION, SOLUTION INTRAMUSCULAR; INTRAVENOUS at 08:05

## 2017-05-01 NOTE — PLAN OF CARE
Problem: Heart Failure (Adult)  Goal: Signs and Symptoms of Listed Potential Problems Will be Absent or Manageable (Heart Failure)  Signs and symptoms of listed potential problems will be absent or manageable by discharge/transition of care (reference Heart Failure (Adult) CPG).   Outcome: Ongoing (interventions implemented as appropriate)  No pm labs ordered per KATHLEEN BROWN.

## 2017-05-01 NOTE — PLAN OF CARE
Problem: Patient Care Overview (Adult)  Goal: Plan of Care Review  Outcome: Ongoing (interventions implemented as appropriate)  Tolerated infusion well with urinary output total 1850 ml.Denies any acute distress at this time.Pt reported eating a whole pizza last week.Reinforced its ok to have 1 slice of pizza on cheap day not the whole pizza.Vebalized understanding and he will buy 1 slice instead of the whole pizza.Return in 2 weeks.

## 2017-05-01 NOTE — PROGRESS NOTES
Discharge home ambulating in stable condition.Vss neuro intact.Denies any acute distress at this time.See care plan note.Return in 2 weeks.Nadn.

## 2017-05-01 NOTE — PLAN OF CARE
Problem: Heart Failure (Adult)  Goal: Signs and Symptoms of Listed Potential Problems Will be Absent or Manageable (Heart Failure)  Signs and symptoms of listed potential problems will be absent or manageable by discharge/transition of care (reference Heart Failure (Adult) CPG).   Outcome: Ongoing (interventions implemented as appropriate)  Arrived ambulating in stable condition.Reported he has been going to the gym again.Wt down 3.5 kg.Orders given per S Bryan NP and carried out.Iv started and labs sent.Lasix 80 mg ivp followed by lasix drip @20 cc x 6 hours.Will continue to monitor

## 2017-05-02 NOTE — PROGRESS NOTES
Subjective:    Patient ID:  Yong Mcintyre is a 41 y.o. male who presents for follow-up of No chief complaint on file.      Congestive Heart Failure   Associated symptoms include shortness of breath.    40 yo AAM presents for biweekly outpatient IV diuretic tx. He remains on Lasix 80 mg bid, Tracleer and Revatio and is on home O2 at 3 LPM. Has gained ~ 80# over the last year 2/2 dietary indiscretion and not going to the gym. Sees Dr. Head at [facility] once a year.    Review of Systems   Constitution: Positive for malaise/fatigue and weight gain.   HENT: Negative.    Eyes: Negative.    Cardiovascular: Positive for dyspnea on exertion and leg swelling.   Respiratory: Positive for shortness of breath and wheezing.    Endocrine: Negative.    Hematologic/Lymphatic: Negative.    Skin: Negative.    Musculoskeletal: Positive for arthritis and joint pain.   Gastrointestinal: Negative.    Genitourinary: Negative.    Neurological: Negative.    Psychiatric/Behavioral: Negative.    Allergic/Immunologic: Negative.         Objective:    Physical Exam   Constitutional: He is oriented to person, place, and time. He appears well-developed and well-nourished.   HENT:   Head: Normocephalic and atraumatic.   Eyes: Conjunctivae are normal. Pupils are equal, round, and reactive to light.   Neck: Normal range of motion. Neck supple. No thyromegaly present.   JVP mid neck   Cardiovascular: Normal rate, regular rhythm and normal heart sounds.    Pulmonary/Chest: Effort normal and breath sounds normal.   Abdominal: Soft. Bowel sounds are normal.   Musculoskeletal: He exhibits edema.   Neurological: He is alert and oriented to person, place, and time.   Skin: Skin is warm and dry.   Psychiatric: He has a normal mood and affect. His behavior is normal. Judgment and thought content normal.         Assessment:       1. Chronic pulmonary heart disease    2. Primary pulmonary hypertension    3. Chronic cardiopulmonary disease    4. Long term  (current) use of anticoagulants         Plan:       Lasix 80 mg IVP, then 20 mg/hr X 6 hours.  Followed by Dr. Head at [facility].  Continue biweekly outpatient infusions.

## 2017-05-05 DIAGNOSIS — I27.9 CHRONIC PULMONARY HEART DISEASE: ICD-10-CM

## 2017-05-05 RX ORDER — ALBUTEROL SULFATE 90 UG/1
2 AEROSOL, METERED RESPIRATORY (INHALATION) EVERY 6 HOURS PRN
Qty: 18 G | Refills: 11 | Status: SHIPPED | OUTPATIENT
Start: 2017-05-05 | End: 2018-05-29 | Stop reason: SDUPTHER

## 2017-05-12 DIAGNOSIS — I27.29 PULMONARY HYPERTENSIVE VENOUS DISEASE: Primary | ICD-10-CM

## 2017-05-15 ENCOUNTER — ANTI-COAG VISIT (OUTPATIENT)
Dept: CARDIOLOGY | Facility: CLINIC | Age: 42
End: 2017-05-15

## 2017-05-15 ENCOUNTER — INFUSION (OUTPATIENT)
Dept: CARDIOLOGY | Facility: HOSPITAL | Age: 42
End: 2017-05-15
Attending: INTERNAL MEDICINE
Payer: MEDICARE

## 2017-05-15 VITALS
HEART RATE: 92 BPM | BODY MASS INDEX: 52.35 KG/M2 | DIASTOLIC BLOOD PRESSURE: 58 MMHG | TEMPERATURE: 98 F | SYSTOLIC BLOOD PRESSURE: 124 MMHG | RESPIRATION RATE: 23 BRPM | WEIGHT: 314.63 LBS

## 2017-05-15 DIAGNOSIS — I27.9 CHRONIC CARDIOPULMONARY DISEASE: ICD-10-CM

## 2017-05-15 DIAGNOSIS — I27.9 CHRONIC PULMONARY HEART DISEASE: ICD-10-CM

## 2017-05-15 DIAGNOSIS — I27.20 PULMONARY HYPERTENSION: Primary | ICD-10-CM

## 2017-05-15 DIAGNOSIS — Z79.01 LONG TERM CURRENT USE OF ANTICOAGULANT THERAPY: ICD-10-CM

## 2017-05-15 DIAGNOSIS — Z79.01 LONG TERM (CURRENT) USE OF ANTICOAGULANTS: ICD-10-CM

## 2017-05-15 LAB
ANION GAP SERPL CALC-SCNC: 9 MMOL/L
BNP SERPL-MCNC: <10 PG/ML
BUN SERPL-MCNC: 23 MG/DL
CALCIUM SERPL-MCNC: 8.6 MG/DL
CHLORIDE SERPL-SCNC: 97 MMOL/L
CO2 SERPL-SCNC: 34 MMOL/L
CREAT SERPL-MCNC: 1.2 MG/DL
EST. GFR  (AFRICAN AMERICAN): >60 ML/MIN/1.73 M^2
EST. GFR  (NON AFRICAN AMERICAN): >60 ML/MIN/1.73 M^2
GLUCOSE SERPL-MCNC: 91 MG/DL
INR PPP: 1.8
MAGNESIUM SERPL-MCNC: 1.9 MG/DL
POTASSIUM SERPL-SCNC: 4.1 MMOL/L
PROTHROMBIN TIME: 17.9 SEC
SODIUM SERPL-SCNC: 140 MMOL/L

## 2017-05-15 PROCEDURE — 83880 ASSAY OF NATRIURETIC PEPTIDE: CPT

## 2017-05-15 PROCEDURE — 96365 THER/PROPH/DIAG IV INF INIT: CPT

## 2017-05-15 PROCEDURE — 96376 TX/PRO/DX INJ SAME DRUG ADON: CPT

## 2017-05-15 PROCEDURE — 25000003 PHARM REV CODE 250: Performed by: NURSE PRACTITIONER

## 2017-05-15 PROCEDURE — 85610 PROTHROMBIN TIME: CPT

## 2017-05-15 PROCEDURE — 63600175 PHARM REV CODE 636 W HCPCS: Performed by: NURSE PRACTITIONER

## 2017-05-15 PROCEDURE — 96366 THER/PROPH/DIAG IV INF ADDON: CPT

## 2017-05-15 PROCEDURE — 99214 OFFICE O/P EST MOD 30 MIN: CPT | Mod: ,,, | Performed by: INTERNAL MEDICINE

## 2017-05-15 PROCEDURE — 99499 UNLISTED E&M SERVICE: CPT | Mod: ,,, | Performed by: PHYSICIAN ASSISTANT

## 2017-05-15 PROCEDURE — 83735 ASSAY OF MAGNESIUM: CPT

## 2017-05-15 PROCEDURE — 80048 BASIC METABOLIC PNL TOTAL CA: CPT

## 2017-05-15 PROCEDURE — 63600175 PHARM REV CODE 636 W HCPCS: Performed by: PHYSICIAN ASSISTANT

## 2017-05-15 RX ORDER — FUROSEMIDE 10 MG/ML
80 INJECTION INTRAMUSCULAR; INTRAVENOUS ONCE
Status: COMPLETED | OUTPATIENT
Start: 2017-05-15 | End: 2017-05-15

## 2017-05-15 RX ADMIN — FUROSEMIDE 20 MG/HR: 10 INJECTION, SOLUTION INTRAMUSCULAR; INTRAVENOUS at 08:05

## 2017-05-15 RX ADMIN — FUROSEMIDE 80 MG: 10 INJECTION, SOLUTION INTRAMUSCULAR; INTRAVENOUS at 09:05

## 2017-05-15 NOTE — PROGRESS NOTES
Discharge  home,vss,nueo intact.Denies any acute distress at this time.See care plan note.Left floor ambulating in stable condition.Return in 2 weeks.Huan

## 2017-05-15 NOTE — PLAN OF CARE
Problem: Patient Care Overview (Adult)  Goal: Plan of Care Review  Outcome: Ongoing (interventions implemented as appropriate)  Arrived in ambulating stable condition. Denies any acute distress at this time.Orders given and carried out D Rolling PA,Iv started and labs sent.Lasix 80 mg and lasix drip  20 cc/hr x 6 hours.Will continue to monitor.

## 2017-05-15 NOTE — PLAN OF CARE
Problem: Heart Failure (Adult)  Goal: Signs and Symptoms of Listed Potential Problems Will be Absent or Manageable (Heart Failure)  Signs and symptoms of listed potential problems will be absent or manageable by discharge/transition of care (reference Heart Failure (Adult) CPG).   Outcome: Ongoing (interventions implemented as appropriate)  PA here and no pm labs ordered.Tolerated infusion well.Urniary output total 1850 ml with net -1247 ml.Denies any acute distress at this time.Reurn in 2 weeks

## 2017-05-16 NOTE — PROGRESS NOTES
Subjective:    Patient ID:  Yong Mcintyre is a 41 y.o. male who presents for follow-up of CHF    HPI  40 yo AAM presents for biweekly outpatient IV diuretic tx. He remains on Lasix 80 mg bid, Tracleer and Revatio and is on home O2 at 3 LPM. Has gained ~ 80# over the last year 2/2 dietary indiscretion and not going to the gym. Sees Dr. Head at [facility] once a year.  Today pt says he is feeling tired but no worsening SOB or GOMEZ.    Review of Systems   Constitution: Positive for malaise/fatigue and weight gain.   Cardiovascular: Positive for dyspnea on exertion and leg swelling. Negative for paroxysmal nocturnal dyspnea.   Respiratory: Positive for shortness of breath.    Gastrointestinal: Negative for nausea.   Neurological: Negative for dizziness and light-headedness.        Objective:    Physical Exam   Constitutional: He is oriented to person, place, and time. He appears well-developed and well-nourished.   BP (!) 124/58 (BP Location: Right arm, Patient Position: Sitting, BP Method: Automatic)  Pulse 92  Temp 98 °F (36.7 °C)  Resp (!) 23  Wt (!) 142.7 kg (314 lb 9.5 oz)  BMI 52.35 kg/m2     Neck: No JVD present.   Cardiovascular: Normal rate, regular rhythm and normal heart sounds.    Pulses:       Radial pulses are 2+ on the right side, and 2+ on the left side.   Pulmonary/Chest: Effort normal and breath sounds normal.   Abdominal: Soft. Bowel sounds are normal. There is no tenderness.   Musculoskeletal: He exhibits no edema.   Neurological: He is alert and oriented to person, place, and time.   Skin: Skin is warm and dry.         Assessment:       1. Pulmonary hypertension    2. Chronic cardiopulmonary disease    3. Chronic pulmonary heart disease    4. Long term (current) use of anticoagulants         Plan:       Lasix 80 mg IVP, then 20 mg/hr X 6 hours.  Followed by Dr. Head at [facility].  Continue biweekly outpatient infusions.

## 2017-05-26 DIAGNOSIS — I27.0 PRIMARY PULMONARY HYPERTENSION: Primary | ICD-10-CM

## 2017-05-26 RX ORDER — FUROSEMIDE 10 MG/ML
80 INJECTION INTRAMUSCULAR; INTRAVENOUS ONCE
Status: COMPLETED | OUTPATIENT
Start: 2017-05-29 | End: 2017-05-29

## 2017-05-29 ENCOUNTER — INFUSION (OUTPATIENT)
Dept: CARDIOLOGY | Facility: HOSPITAL | Age: 42
End: 2017-05-29
Attending: INTERNAL MEDICINE
Payer: MEDICARE

## 2017-05-29 VITALS
HEART RATE: 88 BPM | TEMPERATURE: 98 F | BODY MASS INDEX: 52.48 KG/M2 | WEIGHT: 315 LBS | RESPIRATION RATE: 20 BRPM | DIASTOLIC BLOOD PRESSURE: 72 MMHG | SYSTOLIC BLOOD PRESSURE: 113 MMHG | HEIGHT: 65 IN

## 2017-05-29 DIAGNOSIS — Z79.01 LONG TERM (CURRENT) USE OF ANTICOAGULANTS: ICD-10-CM

## 2017-05-29 DIAGNOSIS — I27.9 CHRONIC CARDIOPULMONARY DISEASE: ICD-10-CM

## 2017-05-29 DIAGNOSIS — I27.9 CHRONIC PULMONARY HEART DISEASE: ICD-10-CM

## 2017-05-29 DIAGNOSIS — I27.0 PRIMARY PULMONARY HYPERTENSION: ICD-10-CM

## 2017-05-29 LAB
ANION GAP SERPL CALC-SCNC: 10 MMOL/L
BNP SERPL-MCNC: <10 PG/ML
BUN SERPL-MCNC: 25 MG/DL
CALCIUM SERPL-MCNC: 8.8 MG/DL
CHLORIDE SERPL-SCNC: 98 MMOL/L
CO2 SERPL-SCNC: 32 MMOL/L
CREAT SERPL-MCNC: 1.3 MG/DL
EST. GFR  (AFRICAN AMERICAN): >60 ML/MIN/1.73 M^2
EST. GFR  (NON AFRICAN AMERICAN): >60 ML/MIN/1.73 M^2
GLUCOSE SERPL-MCNC: 102 MG/DL
INR PPP: 1.5
MAGNESIUM SERPL-MCNC: 1.7 MG/DL
POTASSIUM SERPL-SCNC: 3.6 MMOL/L
PROTHROMBIN TIME: 15.6 SEC
SODIUM SERPL-SCNC: 140 MMOL/L

## 2017-05-29 PROCEDURE — 80048 BASIC METABOLIC PNL TOTAL CA: CPT

## 2017-05-29 PROCEDURE — 96366 THER/PROPH/DIAG IV INF ADDON: CPT

## 2017-05-29 PROCEDURE — 83735 ASSAY OF MAGNESIUM: CPT

## 2017-05-29 PROCEDURE — 25000003 PHARM REV CODE 250: Performed by: PHYSICIAN ASSISTANT

## 2017-05-29 PROCEDURE — 83880 ASSAY OF NATRIURETIC PEPTIDE: CPT

## 2017-05-29 PROCEDURE — 96365 THER/PROPH/DIAG IV INF INIT: CPT

## 2017-05-29 PROCEDURE — 96376 TX/PRO/DX INJ SAME DRUG ADON: CPT

## 2017-05-29 PROCEDURE — 99499 UNLISTED E&M SERVICE: CPT | Mod: ,,, | Performed by: PHYSICIAN ASSISTANT

## 2017-05-29 PROCEDURE — 85610 PROTHROMBIN TIME: CPT

## 2017-05-29 PROCEDURE — 99213 OFFICE O/P EST LOW 20 MIN: CPT | Mod: ,,, | Performed by: INTERNAL MEDICINE

## 2017-05-29 PROCEDURE — 63600175 PHARM REV CODE 636 W HCPCS: Performed by: PHYSICIAN ASSISTANT

## 2017-05-29 RX ADMIN — FUROSEMIDE 80 MG: 10 INJECTION, SOLUTION INTRAMUSCULAR; INTRAVENOUS at 08:05

## 2017-05-29 RX ADMIN — FUROSEMIDE 20 MG/HR: 10 INJECTION, SOLUTION INTRAMUSCULAR; INTRAVENOUS at 08:05

## 2017-05-29 NOTE — PLAN OF CARE
Problem: Cardiac: Heart Failure (Adult)  Goal: Signs and Symptoms of Listed Potential Problems Will be Absent, Minimized or Managed (Cardiac: Heart Failure)  Signs and symptoms of listed potential problems will be absent, minimized or managed by discharge/transition of care (reference Cardiac: Heart Failure (Adult) CPG).  Patient plan of care reviewed with patient. See assessment flowsheet. NAD noted.

## 2017-05-29 NOTE — PLAN OF CARE
Problem: Cardiac: Heart Failure (Adult)  Goal: Signs and Symptoms of Listed Potential Problems Will be Absent, Minimized or Managed (Cardiac: Heart Failure)  Signs and symptoms of listed potential problems will be absent, minimized or managed by discharge/transition of care (reference Cardiac: Heart Failure (Adult) CPG).   Outcome: Outcome(s) achieved Date Met: 05/29/17  Discharge home,vss,neuro intact.Denies any acute distress at this time.See care plan note.Left floor in stable condition via w/c with escort at side.Reurn in 1 week.NAD.

## 2017-05-29 NOTE — PROGRESS NOTES
Subjective:    Patient ID:  Yong Mcintyre is a 41 y.o. male who presents for follow-up of CHF    HPI  40 yo AAM presents for biweekly outpatient IV diuretic tx. He remains on Lasix 80 mg bid, Tracleer and Revatio and is on home O2 at 3 LPM.   Today pt says he is feeling tired but no worsening SOB or GOMEZ, no recent change in weight.     ROS  Constitution: Positive for malaise/fatigue and weight gain.   Cardiovascular: Positive for dyspnea on exertion and leg swelling. Negative for paroxysmal nocturnal dyspnea.   Respiratory: Positive for shortness of breath.    Gastrointestinal: Negative for nausea.   Neurological: Negative for dizziness and light-headedness.   Objective:   Physical Exam  Neck: No JVD present.   Cardiovascular: Normal rate, regular rhythm and normal heart sounds.    Pulses:       Radial pulses are 2+ on the right side, and 2+ on the left side.   Pulmonary/Chest: Effort normal and breath sounds normal.   Abdominal: Soft. Bowel sounds are normal. There is no tenderness.   Musculoskeletal: He exhibits no edema.   Neurological: He is alert and oriented to person, place, and time.   Skin: Skin is warm and dry.     Vitals:    05/29/17 1418   BP: 113/72   Pulse: 88   Resp: 20   Temp:        Lab Results   Component Value Date    BNP <10 05/29/2017     05/29/2017    K 3.6 05/29/2017    MG 1.7 05/29/2017    CL 98 05/29/2017    CO2 32 (H) 05/29/2017    BUN 25 (H) 05/29/2017    CREATININE 1.3 05/29/2017     05/29/2017    AST 29 04/17/2017    ALT 21 04/17/2017    ALBUMIN 3.2 (L) 04/17/2017    PROT 8.6 (H) 04/17/2017    BILITOT 0.3 04/17/2017    CHOL 183 07/13/2015    HDL 50 07/13/2015    LDLCALC 99.0 07/13/2015    TRIG 170 (H) 07/13/2015       Assessment:       1. Chronic pulmonary heart disease    2. Primary pulmonary hypertension    3. Chronic cardiopulmonary disease    4. Long term (current) use of anticoagulants         Plan:     Lasix 80 mg IVP, then 20 mg/hr X 6 hours.  Continue biweekly  outpatient infusions.

## 2017-06-05 ENCOUNTER — OFFICE VISIT (OUTPATIENT)
Dept: OPTOMETRY | Facility: CLINIC | Age: 42
End: 2017-06-05
Payer: MEDICARE

## 2017-06-05 DIAGNOSIS — I10 ESSENTIAL HYPERTENSION: Primary | ICD-10-CM

## 2017-06-05 DIAGNOSIS — H52.13 MYOPIA, BILATERAL: ICD-10-CM

## 2017-06-05 DIAGNOSIS — G47.33 OSA (OBSTRUCTIVE SLEEP APNEA): ICD-10-CM

## 2017-06-05 PROCEDURE — 92015 DETERMINE REFRACTIVE STATE: CPT | Mod: S$GLB,,, | Performed by: OPTOMETRIST

## 2017-06-05 PROCEDURE — 92004 COMPRE OPH EXAM NEW PT 1/>: CPT | Mod: S$GLB,,, | Performed by: OPTOMETRIST

## 2017-06-05 PROCEDURE — 99999 PR PBB SHADOW E&M-EST. PATIENT-LVL I: CPT | Mod: PBBFAC,,, | Performed by: OPTOMETRIST

## 2017-06-09 DIAGNOSIS — I27.0 PRIMARY PULMONARY HYPERTENSION: Primary | ICD-10-CM

## 2017-06-09 RX ORDER — FUROSEMIDE 10 MG/ML
80 INJECTION INTRAMUSCULAR; INTRAVENOUS
Status: COMPLETED | OUTPATIENT
Start: 2017-06-12 | End: 2017-06-12

## 2017-06-12 ENCOUNTER — INFUSION (OUTPATIENT)
Dept: CARDIOLOGY | Facility: HOSPITAL | Age: 42
End: 2017-06-12
Attending: INTERNAL MEDICINE
Payer: MEDICARE

## 2017-06-12 VITALS
HEART RATE: 88 BPM | DIASTOLIC BLOOD PRESSURE: 85 MMHG | SYSTOLIC BLOOD PRESSURE: 124 MMHG | BODY MASS INDEX: 52.61 KG/M2 | RESPIRATION RATE: 15 BRPM | TEMPERATURE: 98 F | WEIGHT: 315 LBS

## 2017-06-12 DIAGNOSIS — I27.9 CHRONIC PULMONARY HEART DISEASE: ICD-10-CM

## 2017-06-12 DIAGNOSIS — I27.20 PULMONARY HYPERTENSION: ICD-10-CM

## 2017-06-12 DIAGNOSIS — I27.0 PRIMARY PULMONARY HYPERTENSION: ICD-10-CM

## 2017-06-12 DIAGNOSIS — I27.9 CHRONIC CARDIOPULMONARY DISEASE: ICD-10-CM

## 2017-06-12 DIAGNOSIS — I48.0 PAROXYSMAL ATRIAL FIBRILLATION: ICD-10-CM

## 2017-06-12 LAB
ALBUMIN SERPL BCP-MCNC: 3 G/DL
ALP SERPL-CCNC: 114 U/L
ALT SERPL W/O P-5'-P-CCNC: 18 U/L
ANION GAP SERPL CALC-SCNC: 10 MMOL/L
AST SERPL-CCNC: 30 U/L
BILIRUB DIRECT SERPL-MCNC: 0.2 MG/DL
BILIRUB SERPL-MCNC: 0.5 MG/DL
BNP SERPL-MCNC: <10 PG/ML
BUN SERPL-MCNC: 15 MG/DL
CALCIUM SERPL-MCNC: 8.9 MG/DL
CHLORIDE SERPL-SCNC: 97 MMOL/L
CO2 SERPL-SCNC: 31 MMOL/L
CREAT SERPL-MCNC: 1.1 MG/DL
EST. GFR  (AFRICAN AMERICAN): >60 ML/MIN/1.73 M^2
EST. GFR  (NON AFRICAN AMERICAN): >60 ML/MIN/1.73 M^2
GLUCOSE SERPL-MCNC: 88 MG/DL
MAGNESIUM SERPL-MCNC: 1.8 MG/DL
POTASSIUM SERPL-SCNC: 4.4 MMOL/L
PROLACTIN SERPL IA-MCNC: 15.3 NG/ML
PROT SERPL-MCNC: 8 G/DL
SODIUM SERPL-SCNC: 138 MMOL/L
T3FREE SERPL-MCNC: 2.7 PG/ML
TSH SERPL DL<=0.005 MIU/L-ACNC: 1.9 UIU/ML

## 2017-06-12 PROCEDURE — 83735 ASSAY OF MAGNESIUM: CPT

## 2017-06-12 PROCEDURE — 83880 ASSAY OF NATRIURETIC PEPTIDE: CPT

## 2017-06-12 PROCEDURE — 84481 FREE ASSAY (FT-3): CPT

## 2017-06-12 PROCEDURE — 84443 ASSAY THYROID STIM HORMONE: CPT

## 2017-06-12 PROCEDURE — 99499 UNLISTED E&M SERVICE: CPT | Mod: ,,, | Performed by: PHYSICIAN ASSISTANT

## 2017-06-12 PROCEDURE — 80076 HEPATIC FUNCTION PANEL: CPT

## 2017-06-12 PROCEDURE — 96366 THER/PROPH/DIAG IV INF ADDON: CPT

## 2017-06-12 PROCEDURE — 80048 BASIC METABOLIC PNL TOTAL CA: CPT

## 2017-06-12 PROCEDURE — 84146 ASSAY OF PROLACTIN: CPT

## 2017-06-12 PROCEDURE — 96365 THER/PROPH/DIAG IV INF INIT: CPT

## 2017-06-12 PROCEDURE — 63600175 PHARM REV CODE 636 W HCPCS: Performed by: NURSE PRACTITIONER

## 2017-06-12 PROCEDURE — 96376 TX/PRO/DX INJ SAME DRUG ADON: CPT

## 2017-06-12 PROCEDURE — 25000003 PHARM REV CODE 250: Performed by: NURSE PRACTITIONER

## 2017-06-12 PROCEDURE — 99213 OFFICE O/P EST LOW 20 MIN: CPT | Mod: ,,, | Performed by: INTERNAL MEDICINE

## 2017-06-12 RX ADMIN — FUROSEMIDE 80 MG: 10 INJECTION, SOLUTION INTRAMUSCULAR; INTRAVENOUS at 07:06

## 2017-06-12 RX ADMIN — FUROSEMIDE 20 MG/HR: 10 INJECTION, SOLUTION INTRAMUSCULAR; INTRAVENOUS at 07:06

## 2017-06-12 NOTE — PLAN OF CARE
Problem: Patient Care Overview (Adult)  Goal: Plan of Care Review  Outcome: Ongoing (interventions implemented as appropriate)  Patient arrived to Infusion Suite ambulatory with home oxygen set at 3LNC. Patient weight of 143.4 kg obtained. Patient hooked to monitor and wall O2. Baseline vitals obtained.  #22 IV site started. Blood collected and sent to lab per orders. Lasix 80 mg administered IVP. Lasix infusion started at a rate of 20 ml/hr with an infusion span of 6 hours. No PM labs ordered. Infusion completed at 1400.  IV site removed, tip intact. Site WNL. Patient ambulated off unit at 1415.

## 2017-06-23 DIAGNOSIS — I27.0 PRIMARY PULMONARY HYPERTENSION: Primary | ICD-10-CM

## 2017-06-26 ENCOUNTER — INFUSION (OUTPATIENT)
Dept: CARDIOLOGY | Facility: HOSPITAL | Age: 42
End: 2017-06-26
Attending: INTERNAL MEDICINE
Payer: MEDICARE

## 2017-06-26 ENCOUNTER — ANTI-COAG VISIT (OUTPATIENT)
Dept: CARDIOLOGY | Facility: CLINIC | Age: 42
End: 2017-06-26

## 2017-06-26 VITALS
RESPIRATION RATE: 27 BRPM | OXYGEN SATURATION: 95 % | DIASTOLIC BLOOD PRESSURE: 74 MMHG | BODY MASS INDEX: 53.93 KG/M2 | SYSTOLIC BLOOD PRESSURE: 133 MMHG | WEIGHT: 315 LBS | TEMPERATURE: 98 F | HEART RATE: 98 BPM

## 2017-06-26 DIAGNOSIS — Z79.01 LONG TERM (CURRENT) USE OF ANTICOAGULANTS: ICD-10-CM

## 2017-06-26 DIAGNOSIS — I27.20 PULMONARY HYPERTENSION: Primary | ICD-10-CM

## 2017-06-26 DIAGNOSIS — I27.9 CHRONIC PULMONARY HEART DISEASE: ICD-10-CM

## 2017-06-26 DIAGNOSIS — Z79.01 LONG TERM CURRENT USE OF ANTICOAGULANT THERAPY: ICD-10-CM

## 2017-06-26 DIAGNOSIS — I27.0 PRIMARY PULMONARY HYPERTENSION: ICD-10-CM

## 2017-06-26 DIAGNOSIS — I27.9 CHRONIC CARDIOPULMONARY DISEASE: ICD-10-CM

## 2017-06-26 LAB
ANION GAP SERPL CALC-SCNC: 8 MMOL/L
BNP SERPL-MCNC: <10 PG/ML
BUN SERPL-MCNC: 25 MG/DL
CALCIUM SERPL-MCNC: 8.9 MG/DL
CHLORIDE SERPL-SCNC: 95 MMOL/L
CO2 SERPL-SCNC: 35 MMOL/L
CREAT SERPL-MCNC: 1.1 MG/DL
EST. GFR  (AFRICAN AMERICAN): >60 ML/MIN/1.73 M^2
EST. GFR  (NON AFRICAN AMERICAN): >60 ML/MIN/1.73 M^2
GLUCOSE SERPL-MCNC: 90 MG/DL
INR PPP: 1.9
MAGNESIUM SERPL-MCNC: 1.6 MG/DL
POTASSIUM SERPL-SCNC: 3.6 MMOL/L
PROTHROMBIN TIME: 18.9 SEC
SODIUM SERPL-SCNC: 138 MMOL/L

## 2017-06-26 PROCEDURE — 63600175 PHARM REV CODE 636 W HCPCS: Performed by: PHYSICIAN ASSISTANT

## 2017-06-26 PROCEDURE — 99499 UNLISTED E&M SERVICE: CPT | Mod: ,,, | Performed by: PHYSICIAN ASSISTANT

## 2017-06-26 PROCEDURE — 83880 ASSAY OF NATRIURETIC PEPTIDE: CPT

## 2017-06-26 PROCEDURE — 25000003 PHARM REV CODE 250: Performed by: PHYSICIAN ASSISTANT

## 2017-06-26 PROCEDURE — 80048 BASIC METABOLIC PNL TOTAL CA: CPT

## 2017-06-26 PROCEDURE — 85610 PROTHROMBIN TIME: CPT

## 2017-06-26 PROCEDURE — 96376 TX/PRO/DX INJ SAME DRUG ADON: CPT

## 2017-06-26 PROCEDURE — 96365 THER/PROPH/DIAG IV INF INIT: CPT

## 2017-06-26 PROCEDURE — 83735 ASSAY OF MAGNESIUM: CPT

## 2017-06-26 PROCEDURE — 96366 THER/PROPH/DIAG IV INF ADDON: CPT

## 2017-06-26 PROCEDURE — 36415 COLL VENOUS BLD VENIPUNCTURE: CPT

## 2017-06-26 RX ORDER — POTASSIUM CHLORIDE 750 MG/1
40 TABLET, EXTENDED RELEASE ORAL
Status: COMPLETED | OUTPATIENT
Start: 2017-06-26 | End: 2017-06-26

## 2017-06-26 RX ORDER — FUROSEMIDE 10 MG/ML
80 INJECTION INTRAMUSCULAR; INTRAVENOUS ONCE
Status: COMPLETED | OUTPATIENT
Start: 2017-06-26 | End: 2017-06-26

## 2017-06-26 RX ADMIN — POTASSIUM CHLORIDE 40 MEQ: 750 TABLET, EXTENDED RELEASE ORAL at 01:06

## 2017-06-26 RX ADMIN — FUROSEMIDE 20 MG/HR: 10 INJECTION, SOLUTION INTRAMUSCULAR; INTRAVENOUS at 08:06

## 2017-06-26 RX ADMIN — FUROSEMIDE 80 MG: 10 INJECTION, SOLUTION INTRAMUSCULAR; INTRAVENOUS at 08:06

## 2017-06-26 NOTE — PLAN OF CARE
Problem: Heart Failure (Adult)  Goal: Signs and Symptoms of Listed Potential Problems Will be Absent or Manageable (Heart Failure)  Signs and symptoms of listed potential problems will be absent or manageable by discharge/transition of care (reference Heart Failure (Adult) CPG).    Outcome: Ongoing (interventions implemented as appropriate)  Notified LUCERO BROWN of am labs K+3.6 and 1.6.Supplmented with KCL 40 mEq po x 1 dose and pt took own Mag ox 400 mg.Tolerated infusion well.Denies any acute distress at this time.Urinary output total 3150 ml with net -2122 ml.Return in 2 weeks.

## 2017-06-26 NOTE — PROGRESS NOTES
Discharge home,vss,reynaldo intact.Denies any acute distress at this time.See care plan note.Left floor ambulating in stable condition.Return in 2 weeks.Nadn

## 2017-06-26 NOTE — PROGRESS NOTES
Subjective:    Patient ID:  Yong Mcintyre is a 41 y.o. male who presents for follow-up of CHF    HPI  42 yo AAM presents for biweekly outpatient IV diuretic tx. He remains on Lasix 80 mg bid, Tracleer and Revatio and is on home O2 at 3 LPM.   Pt is having trouble sleeping at night. Not due to PND/orthopnea. No worsening SOB    Review of Systems   Constitution: Positive for weight gain.   Cardiovascular: Positive for dyspnea on exertion. Negative for leg swelling, orthopnea and paroxysmal nocturnal dyspnea.   Respiratory: Positive for shortness of breath.    Gastrointestinal: Negative for nausea.   Neurological: Negative for dizziness and light-headedness.        Objective:    Physical Exam   Constitutional: He is oriented to person, place, and time. He appears well-developed and well-nourished.   /74 (BP Location: Right arm, Patient Position: Sitting, BP Method: Automatic)   Pulse 99   Resp (!) 30   SpO2 95%      Neck: No JVD present.   Cardiovascular: Normal rate, regular rhythm and normal heart sounds.    Pulses:       Radial pulses are 2+ on the right side, and 2+ on the left side.   Pulmonary/Chest: Effort normal and breath sounds normal.   Abdominal: Soft. Bowel sounds are normal. There is no tenderness.   Musculoskeletal: He exhibits no edema.   Neurological: He is alert and oriented to person, place, and time.   Skin: Skin is warm and dry.         Assessment:       1. Pulmonary hypertension    2. Chronic pulmonary heart disease    3. Primary pulmonary hypertension    4. Chronic cardiopulmonary disease    5. Long term (current) use of anticoagulants         Plan:       Lasix 80 mg IVP, then 20 mg/hr X 6 hours.  Continue biweekly outpatient infusions.

## 2017-06-26 NOTE — PLAN OF CARE
Problem: Patient Care Overview (Adult)  Goal: Plan of Care Review  Outcome: Ongoing (interventions implemented as appropriate)  Arrived in 29stable condition.Voices concerns of lt arm pain with movement.Appointment made with PCP for 6/29/17.Orders given per D Rolling PA and carried out.Lasix 80 mg ivp followed by Lasix drip @20 cc/hr x 6 hours.PA here and no evening labs ordered.Will continue to monitor

## 2017-06-29 ENCOUNTER — OFFICE VISIT (OUTPATIENT)
Dept: INTERNAL MEDICINE | Facility: CLINIC | Age: 42
End: 2017-06-29
Payer: MEDICARE

## 2017-06-29 VITALS
HEIGHT: 65 IN | HEART RATE: 92 BPM | DIASTOLIC BLOOD PRESSURE: 72 MMHG | WEIGHT: 315 LBS | SYSTOLIC BLOOD PRESSURE: 121 MMHG | BODY MASS INDEX: 52.48 KG/M2

## 2017-06-29 DIAGNOSIS — M77.12 EPICONDYLITIS, LATERAL, LEFT: Primary | ICD-10-CM

## 2017-06-29 PROCEDURE — 99999 PR PBB SHADOW E&M-EST. PATIENT-LVL III: CPT | Mod: PBBFAC,,, | Performed by: INTERNAL MEDICINE

## 2017-06-29 PROCEDURE — 99213 OFFICE O/P EST LOW 20 MIN: CPT | Mod: S$GLB,,, | Performed by: INTERNAL MEDICINE

## 2017-06-29 RX ORDER — METHYLPREDNISOLONE 4 MG/1
TABLET ORAL
Qty: 1 PACKAGE | Refills: 0 | Status: SHIPPED | OUTPATIENT
Start: 2017-06-29 | End: 2017-12-20 | Stop reason: ALTCHOICE

## 2017-06-29 NOTE — PROGRESS NOTES
REASON FOR VISIT:  This is a 41-year-old male.  For three weeks he has been   having pain involving the lateral aspect of his left elbow that extends a little   bit down the forearm.  He feels it when he extends his arm out.    PAST MEDICAL HISTORY:  Pulmonary hypertension.  Obstructive sleep apnea.  Chronic respiratory failure with hypoventilation.  Paroxysmal atrial fibrillation.  Left ventricular dysfunction.    MEDICATIONS:  List per MedCard, which includes Coumadin.    PHYSICAL EXAMINATION:  VITAL SIGNS:  Per Epic.  EXTREMITIES:  He is tender over the lateral epicondyle.  It is worse with   supination of the forearm at the elbow joint, as well as extension.    IMPRESSION:  Left lateral epicondylitis.    PLAN:  We will avoid the use of a nonsteroidal.  Medrol Dosepak was given, wear   an elbow brace, and the use of cold compress on a regular basis was discussed.    /sc [id number] review      JAM/FLAVIA  dd: 06/29/2017 14:23:38 (CDT)  td: 07/13/2017 13:32:19 (CDT)  Doc ID   #[id number]  Job ID #[id number]    CC:

## 2017-06-30 ENCOUNTER — TELEPHONE (OUTPATIENT)
Dept: SLEEP MEDICINE | Facility: CLINIC | Age: 42
End: 2017-06-30

## 2017-06-30 NOTE — TELEPHONE ENCOUNTER
----- Message from Letha Flanagan sent at 6/30/2017 11:46 AM CDT -----  Contact: Self  X   1st Request  _  2nd Request  _  3rd Request        Who: BHARGAV VAZQUEZ [[id number]]    Why: Pt would like to know the longest he can go without using his CPAP machine. Please call to further discuss, thanks!    What Number to Call Back: [phone]    When to Expect a call back: (With in 24 hours)

## 2017-06-30 NOTE — TELEPHONE ENCOUNTER
Patient states a plastics piece of the machine came off. He thinks something is leaking from his machine. Patient would like to know what should he do. He could not remember where he got his machine from. Appointment schedule 07/20/2017. Please advised.

## 2017-07-06 DIAGNOSIS — I27.0 PRIMARY PULMONARY HYPERTENSION: Primary | ICD-10-CM

## 2017-07-10 ENCOUNTER — INFUSION (OUTPATIENT)
Dept: CARDIOLOGY | Facility: HOSPITAL | Age: 42
End: 2017-07-10
Attending: INTERNAL MEDICINE
Payer: MEDICARE

## 2017-07-10 VITALS
WEIGHT: 315 LBS | BODY MASS INDEX: 54.41 KG/M2 | DIASTOLIC BLOOD PRESSURE: 78 MMHG | HEART RATE: 82 BPM | RESPIRATION RATE: 18 BRPM | TEMPERATURE: 98 F | SYSTOLIC BLOOD PRESSURE: 116 MMHG

## 2017-07-10 DIAGNOSIS — I27.0 PRIMARY PULMONARY HYPERTENSION: ICD-10-CM

## 2017-07-10 DIAGNOSIS — I27.9 CHRONIC PULMONARY HEART DISEASE: Primary | ICD-10-CM

## 2017-07-10 DIAGNOSIS — I27.9 CHRONIC CARDIOPULMONARY DISEASE: ICD-10-CM

## 2017-07-10 DIAGNOSIS — Z79.01 LONG TERM (CURRENT) USE OF ANTICOAGULANTS: ICD-10-CM

## 2017-07-10 LAB
ANION GAP SERPL CALC-SCNC: 9 MMOL/L
BNP SERPL-MCNC: <10 PG/ML
BUN SERPL-MCNC: 18 MG/DL
CALCIUM SERPL-MCNC: 9 MG/DL
CHLORIDE SERPL-SCNC: 95 MMOL/L
CO2 SERPL-SCNC: 35 MMOL/L
CREAT SERPL-MCNC: 1.1 MG/DL
EST. GFR  (AFRICAN AMERICAN): >60 ML/MIN/1.73 M^2
EST. GFR  (NON AFRICAN AMERICAN): >60 ML/MIN/1.73 M^2
GLUCOSE SERPL-MCNC: 89 MG/DL
INR PPP: 1.9
MAGNESIUM SERPL-MCNC: 2 MG/DL
POTASSIUM SERPL-SCNC: 3.7 MMOL/L
PROTHROMBIN TIME: 19.3 SEC
SODIUM SERPL-SCNC: 139 MMOL/L

## 2017-07-10 PROCEDURE — 80048 BASIC METABOLIC PNL TOTAL CA: CPT

## 2017-07-10 PROCEDURE — 99213 OFFICE O/P EST LOW 20 MIN: CPT | Mod: ,,, | Performed by: INTERNAL MEDICINE

## 2017-07-10 PROCEDURE — 85610 PROTHROMBIN TIME: CPT

## 2017-07-10 PROCEDURE — 63600175 PHARM REV CODE 636 W HCPCS: Performed by: PHYSICIAN ASSISTANT

## 2017-07-10 PROCEDURE — 25000003 PHARM REV CODE 250: Performed by: NURSE PRACTITIONER

## 2017-07-10 PROCEDURE — 83735 ASSAY OF MAGNESIUM: CPT

## 2017-07-10 PROCEDURE — 63600175 PHARM REV CODE 636 W HCPCS: Performed by: NURSE PRACTITIONER

## 2017-07-10 PROCEDURE — 99499 UNLISTED E&M SERVICE: CPT | Mod: ,,, | Performed by: PHYSICIAN ASSISTANT

## 2017-07-10 PROCEDURE — 96366 THER/PROPH/DIAG IV INF ADDON: CPT

## 2017-07-10 PROCEDURE — 96376 TX/PRO/DX INJ SAME DRUG ADON: CPT

## 2017-07-10 PROCEDURE — 96365 THER/PROPH/DIAG IV INF INIT: CPT

## 2017-07-10 PROCEDURE — 83880 ASSAY OF NATRIURETIC PEPTIDE: CPT

## 2017-07-10 RX ORDER — FUROSEMIDE 10 MG/ML
80 INJECTION INTRAMUSCULAR; INTRAVENOUS
Status: COMPLETED | OUTPATIENT
Start: 2017-07-10 | End: 2017-07-10

## 2017-07-10 RX ADMIN — FUROSEMIDE 80 MG: 10 INJECTION, SOLUTION INTRAMUSCULAR; INTRAVENOUS at 07:07

## 2017-07-10 RX ADMIN — FUROSEMIDE 20 MG/HR: 10 INJECTION, SOLUTION INTRAMUSCULAR; INTRAVENOUS at 07:07

## 2017-07-10 NOTE — PLAN OF CARE
Problem: Heart Failure (Adult)  Goal: Signs and Symptoms of Listed Potential Problems Will be Absent or Manageable (Heart Failure)  Signs and symptoms of listed potential problems will be absent or manageable by discharge/transition of care (reference Heart Failure (Adult) CPG).    Outcome: Ongoing (interventions implemented as appropriate)  Patient arrived ambulatory to infusion suite. Patient weight of 148.3 kg obtained. Patient connected to wall O2 at 3L per NC. Vital signs taken and charted.  #22 IV site started and blood collected then sent to lab per MD order. Lasix 80 mg administered IV push. Lasix infusion at 20 cc/hr started and ran for 6 hours. No issues reported by patient. No PM labs ordered. Infusion completed at 1415. IV removed. Patient hooked to portable O2. Patient ambulated out of suite at 1425

## 2017-07-10 NOTE — PROGRESS NOTES
Subjective:    Patient ID:  Yong Mcintyre is a 41 y.o. male who presents for follow-up of CHF    HPI  40 yo AAM presents for biweekly outpatient IV diuretic tx. He remains on Lasix 80 mg bid, Tracleer and Revatio and is on home O2 at 3 LPM.   Pt reports he is still having trouble sleeping at night. States he is waking up early and having trouble falling asleep.  Not due to PND/orthopnea. No worsening SOB.   Weight is 148.3 kg up 1.3kg from last visit     Review of Systems   Constitution: Positive for weight gain.   Cardiovascular: Positive for dyspnea on exertion. Negative for leg swelling, orthopnea and paroxysmal nocturnal dyspnea.   Respiratory: Positive for shortness of breath.    Gastrointestinal: Negative for nausea.   Neurological: Negative for dizziness and light-headedness.        Objective:    Physical Exam   Constitutional: He is oriented to person, place, and time. He appears well-developed and well-nourished.   /74 (BP Location: Right arm, Patient Position: Sitting, BP Method: Automatic)   Pulse 99   Resp (!) 30   SpO2 95%      Neck: No JVD present.   Cardiovascular: Normal rate, regular rhythm and normal heart sounds.    Pulses:       Radial pulses are 2+ on the right side, and 2+ on the left side.   Pulmonary/Chest: Effort normal and breath sounds normal.   Abdominal: Soft. Bowel sounds are normal. There is no tenderness.   Musculoskeletal: He exhibits no edema.   Neurological: He is alert and oriented to person, place, and time.   Skin: Skin is warm and dry.         Assessment:       1. Chronic pulmonary heart disease    2. Primary pulmonary hypertension    3. Chronic cardiopulmonary disease    4. Long term (current) use of anticoagulants         Plan:       Lasix 80 mg IVP, then 20 mg/hr X 6 hours.  Continue biweekly outpatient infusions.

## 2017-07-11 ENCOUNTER — ANTI-COAG VISIT (OUTPATIENT)
Dept: CARDIOLOGY | Facility: CLINIC | Age: 42
End: 2017-07-11

## 2017-07-11 DIAGNOSIS — Z79.01 LONG TERM CURRENT USE OF ANTICOAGULANT THERAPY: ICD-10-CM

## 2017-07-20 ENCOUNTER — OFFICE VISIT (OUTPATIENT)
Dept: SLEEP MEDICINE | Facility: CLINIC | Age: 42
End: 2017-07-20
Payer: MEDICARE

## 2017-07-20 VITALS
BODY MASS INDEX: 52.48 KG/M2 | HEART RATE: 88 BPM | SYSTOLIC BLOOD PRESSURE: 118 MMHG | HEIGHT: 65 IN | WEIGHT: 315 LBS | DIASTOLIC BLOOD PRESSURE: 72 MMHG

## 2017-07-20 DIAGNOSIS — G47.33 OBSTRUCTIVE SLEEP APNEA: Primary | ICD-10-CM

## 2017-07-20 PROCEDURE — 99499 UNLISTED E&M SERVICE: CPT | Mod: S$GLB,,, | Performed by: NURSE PRACTITIONER

## 2017-07-20 PROCEDURE — 99999 PR PBB SHADOW E&M-EST. PATIENT-LVL III: CPT | Mod: PBBFAC,,, | Performed by: NURSE PRACTITIONER

## 2017-07-20 PROCEDURE — 99213 OFFICE O/P EST LOW 20 MIN: CPT | Mod: S$GLB,,, | Performed by: NURSE PRACTITIONER

## 2017-07-20 NOTE — PROGRESS NOTES
"Mr. Mcintyre returns to the clinic today for followup on obstructive sleep apnea. He continues to wear his BPAP 16/11 with 3 L at night (continuous O2). He wants a new machine.  He continues to use Quattro Medium FFM without significant air leaks, oral drying, or nasal drying.  He believes BPAP is still helping him; no break through snoring. Having trouble falling asleep even whenhe was working out at gym 2x day last year. Zyquil helping sleep good 7hrs. Gasket on machine ripped so its affected his use. Continued otherwise using nightly ofcourse with his O2.      previous Interrogation:Med mask, 0% air leaks. AHI 6.9 (8.5). 30d avg 6.9h/night. 29/30d>4hs. Heat at 0. machine in good condition. 0% air leak. 0% periodic breathing.     ESS=2    PSG 5/29/08:  and SaO2 of 62% (sleep efficiency 90.7%).  CPAP titration on 10/13/11: Effective control of respiratory events was achieved at 12/8 with best results achieved at 14/9 cm of H2O. Weight 250 lbs.  Titration study 4/30/14: Effective control of sleep disordered breathing was seen in supine REM sleep on 16/11 cm H2O. EPAP lower than 11 cm H2O was associated obstructive apneas. Higher IPAP 17-18 cm H2O were associated with central apneas    REVIEW OF SYSTEMS: Sleep related symptoms as per HPI.18# weight gain. Denies sinus congestion, or am headaches.  Sleeps 2-4a-12p which he prefers this scheduled at this time    PHYSICAL EXAM:   /72   Pulse 88   Ht 5' 5" (1.651 m)   Wt (!) 147.2 kg (324 lb 8.3 oz)   BMI 54.00 kg/m²   GENERAL: W/D, morbid obese,  well groomed      IMPRESSION:   1. Obstructive sleep apnea - overlap syndrome. Tolerates 16/11with 3 LO2. Continues to benefit from therapy.  Needs new machine  2. Chronic Pulmonary  Disease       PLAN:  Continue BPAP 16/11cm. RX faxed to [facility] for new machine. Continue  3L O2 with pulm f/u as advised.     Discussed health benefits of continued BPAP compliance and the potential ramifications of untreated sleep " apnea, which could include daytime sleepiness, hypertension, heart disease and/or stroke. Advised not to drive if sleepy.      Encouraged weight loss efforts for potential improvement of MALLIKA and overall health benefits.   RTC 4-5 wks AFTER setup adherence

## 2017-07-21 DIAGNOSIS — I27.0 PRIMARY PULMONARY HYPERTENSION: Primary | ICD-10-CM

## 2017-07-31 DIAGNOSIS — I27.20 PULMONARY HYPERTENSION: ICD-10-CM

## 2017-07-31 DIAGNOSIS — Q21.12 PFO (PATENT FORAMEN OVALE): ICD-10-CM

## 2017-07-31 DIAGNOSIS — Z79.01 LONG-TERM (CURRENT) USE OF ANTICOAGULANTS: ICD-10-CM

## 2017-07-31 DIAGNOSIS — I48.0 PAROXYSMAL ATRIAL FIBRILLATION: ICD-10-CM

## 2017-07-31 DIAGNOSIS — I27.9 CHRONIC PULMONARY HEART DISEASE: ICD-10-CM

## 2017-07-31 DIAGNOSIS — I27.0 PRIMARY PULMONARY HYPERTENSION: ICD-10-CM

## 2017-07-31 DIAGNOSIS — E05.90 HYPERTHYROIDISM: ICD-10-CM

## 2017-07-31 DIAGNOSIS — E66.2 PICKWICKIAN SYNDROME: ICD-10-CM

## 2017-07-31 DIAGNOSIS — I27.81 COR PULMONALE: ICD-10-CM

## 2017-07-31 RX ORDER — FUROSEMIDE 40 MG/1
80 TABLET ORAL 2 TIMES DAILY
Qty: 120 TABLET | Refills: 11
Start: 2017-07-31 | End: 2018-09-17 | Stop reason: SDUPTHER

## 2017-07-31 RX ORDER — FUROSEMIDE 40 MG/1
TABLET ORAL
Qty: 360 TABLET | Refills: 4 | Status: SHIPPED | OUTPATIENT
Start: 2017-07-31 | End: 2018-10-29 | Stop reason: SDUPTHER

## 2017-07-31 RX ORDER — ALLOPURINOL 300 MG/1
300 TABLET ORAL DAILY
Qty: 30 TABLET | Refills: 11 | Status: SHIPPED | OUTPATIENT
Start: 2017-07-31 | End: 2018-09-05 | Stop reason: SDUPTHER

## 2017-08-02 DIAGNOSIS — N18.30 CHRONIC KIDNEY DISEASE, STAGE III (MODERATE): ICD-10-CM

## 2017-08-02 DIAGNOSIS — I27.0 PRIMARY PULMONARY HYPERTENSION: Primary | ICD-10-CM

## 2017-08-03 ENCOUNTER — ANTI-COAG VISIT (OUTPATIENT)
Dept: CARDIOLOGY | Facility: CLINIC | Age: 42
End: 2017-08-03

## 2017-08-03 ENCOUNTER — INFUSION (OUTPATIENT)
Dept: CARDIOLOGY | Facility: HOSPITAL | Age: 42
End: 2017-08-03
Attending: INTERNAL MEDICINE
Payer: MEDICARE

## 2017-08-03 VITALS
SYSTOLIC BLOOD PRESSURE: 129 MMHG | TEMPERATURE: 98 F | HEART RATE: 88 BPM | RESPIRATION RATE: 24 BRPM | WEIGHT: 315 LBS | DIASTOLIC BLOOD PRESSURE: 88 MMHG | OXYGEN SATURATION: 93 % | BODY MASS INDEX: 54.48 KG/M2

## 2017-08-03 DIAGNOSIS — I27.9 CHRONIC CARDIOPULMONARY DISEASE: ICD-10-CM

## 2017-08-03 DIAGNOSIS — I27.0 PRIMARY PULMONARY HYPERTENSION: ICD-10-CM

## 2017-08-03 DIAGNOSIS — Z79.01 LONG TERM CURRENT USE OF ANTICOAGULANT THERAPY: ICD-10-CM

## 2017-08-03 DIAGNOSIS — I27.9 CHRONIC PULMONARY HEART DISEASE: ICD-10-CM

## 2017-08-03 DIAGNOSIS — Z79.01 LONG TERM (CURRENT) USE OF ANTICOAGULANTS: ICD-10-CM

## 2017-08-03 LAB
ALBUMIN SERPL BCP-MCNC: 3.2 G/DL
ALP SERPL-CCNC: 124 U/L
ALT SERPL W/O P-5'-P-CCNC: 22 U/L
ANION GAP SERPL CALC-SCNC: 8 MMOL/L
AST SERPL-CCNC: 30 U/L
BILIRUB DIRECT SERPL-MCNC: 0.2 MG/DL
BILIRUB SERPL-MCNC: 0.5 MG/DL
BNP SERPL-MCNC: <10 PG/ML
BUN SERPL-MCNC: 24 MG/DL
CALCIUM SERPL-MCNC: 8.8 MG/DL
CHLORIDE SERPL-SCNC: 95 MMOL/L
CO2 SERPL-SCNC: 37 MMOL/L
CREAT SERPL-MCNC: 1.2 MG/DL
EST. GFR  (AFRICAN AMERICAN): >60 ML/MIN/1.73 M^2
EST. GFR  (NON AFRICAN AMERICAN): >60 ML/MIN/1.73 M^2
GLUCOSE SERPL-MCNC: 85 MG/DL
INR PPP: 1.3
MAGNESIUM SERPL-MCNC: 1.8 MG/DL
POTASSIUM SERPL-SCNC: 3.7 MMOL/L
PROT SERPL-MCNC: 8.1 G/DL
PROTHROMBIN TIME: 13.9 SEC
SODIUM SERPL-SCNC: 140 MMOL/L

## 2017-08-03 PROCEDURE — 83880 ASSAY OF NATRIURETIC PEPTIDE: CPT

## 2017-08-03 PROCEDURE — 85610 PROTHROMBIN TIME: CPT

## 2017-08-03 PROCEDURE — 99499 UNLISTED E&M SERVICE: CPT | Mod: ,,, | Performed by: PHYSICIAN ASSISTANT

## 2017-08-03 PROCEDURE — 80048 BASIC METABOLIC PNL TOTAL CA: CPT

## 2017-08-03 PROCEDURE — 80076 HEPATIC FUNCTION PANEL: CPT

## 2017-08-03 PROCEDURE — 96366 THER/PROPH/DIAG IV INF ADDON: CPT

## 2017-08-03 PROCEDURE — 99213 OFFICE O/P EST LOW 20 MIN: CPT | Mod: ,,, | Performed by: PHYSICIAN ASSISTANT

## 2017-08-03 PROCEDURE — 63600175 PHARM REV CODE 636 W HCPCS: Performed by: PHYSICIAN ASSISTANT

## 2017-08-03 PROCEDURE — 83735 ASSAY OF MAGNESIUM: CPT

## 2017-08-03 PROCEDURE — 96365 THER/PROPH/DIAG IV INF INIT: CPT

## 2017-08-03 PROCEDURE — 63600175 PHARM REV CODE 636 W HCPCS: Performed by: NURSE PRACTITIONER

## 2017-08-03 PROCEDURE — 25000003 PHARM REV CODE 250: Performed by: NURSE PRACTITIONER

## 2017-08-03 PROCEDURE — 96376 TX/PRO/DX INJ SAME DRUG ADON: CPT

## 2017-08-03 RX ORDER — FUROSEMIDE 10 MG/ML
80 INJECTION INTRAMUSCULAR; INTRAVENOUS
Status: COMPLETED | OUTPATIENT
Start: 2017-08-03 | End: 2017-08-03

## 2017-08-03 RX ADMIN — FUROSEMIDE 80 MG: 10 INJECTION, SOLUTION INTRAMUSCULAR; INTRAVENOUS at 08:08

## 2017-08-03 RX ADMIN — FUROSEMIDE 20 MG/HR: 10 INJECTION, SOLUTION INTRAVENOUS at 08:08

## 2017-08-03 NOTE — PLAN OF CARE
Problem: Cardiac: Heart Failure (Adult)  Goal: Signs and Symptoms of Listed Potential Problems Will be Absent, Minimized or Managed (Cardiac: Heart Failure)  Signs and symptoms of listed potential problems will be absent, minimized or managed by discharge/transition of care (reference Cardiac: Heart Failure (Adult) CPG).   Outcome: Ongoing (interventions implemented as appropriate)  Tolerated infusion well AEB urine output total 2450 ml with net -1872 ml.Denies any acute distress at this time.Reinforced the importance of attending  HFIS appointment as scheduled.Verbalized understanding.Return in about 2 weeks

## 2017-08-03 NOTE — PLAN OF CARE
Problem: Patient Care Overview  Goal: Plan of Care Review  Missed 2 visits.Arrived in stable condition.Compalint of increase sob at times.Topher lower lungs with slight crackles and ble with trace edema.Orders given per S Sukhjinder BROWN and carried out.Lasix 80 mg ivp followed by lasix drip @20 cc/hr.Will continue to monitor

## 2017-08-03 NOTE — PROGRESS NOTES
Discharge home,vss,neuro intact.Denies any acute distress at this time.See care plan note.Left floor ambulating in stable condition.Return in 2 weeks

## 2017-08-03 NOTE — PROGRESS NOTES
Subjective:   Patient ID:  Yong Mcintyre is a 41 y.o. male who presents for follow-up of CHF    HPI    42 yo AM presents for biweekly outpatient IV diuretic tx. He remains on Lasix 80 mg bid, Tracleer and Revatio and is on home O2 at 3 LPM.   Pt reports he is still having trouble sleeping at night. States he is waking up early and having trouble falling asleep.  Not due to PND/orthopnea. No worsening SOB.     Review of Systems   Constitution: Positive for weight gain.   Cardiovascular: Positive for dyspnea on exertion. Negative for leg swelling, orthopnea and paroxysmal nocturnal dyspnea.   Respiratory: Positive for shortness of breath.    Gastrointestinal: Negative for nausea.   Neurological: Negative for dizziness and light-headedness.        Objective:    Physical Exam   Constitutional: He is oriented to person, place, and time. He appears well-developed and well-nourished.   /74 (BP Location: Right arm, Patient Position: Sitting, BP Method: Automatic)   Pulse 99   Resp (!) 30   SpO2 95%      Neck: No JVD present.   Cardiovascular: Normal rate, regular rhythm and normal heart sounds.    Pulses:       Radial pulses are 2+ on the right side, and 2+ on the left side.   Pulmonary/Chest: Effort normal and breath sounds normal.   Abdominal: Soft. Bowel sounds are normal. There is no tenderness.   Musculoskeletal: He exhibits no edema.   Neurological: He is alert and oriented to person, place, and time.   Skin: Skin is warm and dry.         Assessment:       1. Chronic pulmonary heart disease    2. Primary pulmonary hypertension    3. Chronic cardiopulmonary disease    4. Long term (current) use of anticoagulants         Plan:       Lasix 80 mg IVP, then 20 mg/hr X 6 hours.  Continue biweekly outpatient infusions.

## 2017-08-03 NOTE — PROGRESS NOTES
Patient questioned confirmed incorrect dose has been taking 15 mg wed and 10 mg all other days.  Also reports taking 5 mg on mon 7/31, missing Tue 8/1 dose , and taking 15 mg wed 8/2.  Patient stated  he ran out of coumadin on Monday and Tuesday but received a refill on Wednesday . Ate spinach over the weekend and has been taking over the counter  zzzquil liquid capsules .

## 2017-08-18 DIAGNOSIS — I27.0 PRIMARY PULMONARY HYPERTENSION: Primary | ICD-10-CM

## 2017-08-18 DIAGNOSIS — N18.30 CHRONIC KIDNEY DISEASE, STAGE III (MODERATE): ICD-10-CM

## 2017-08-21 ENCOUNTER — INFUSION (OUTPATIENT)
Dept: CARDIOLOGY | Facility: HOSPITAL | Age: 42
End: 2017-08-21
Attending: INTERNAL MEDICINE
Payer: MEDICARE

## 2017-08-21 VITALS
HEART RATE: 99 BPM | SYSTOLIC BLOOD PRESSURE: 118 MMHG | RESPIRATION RATE: 20 BRPM | OXYGEN SATURATION: 95 % | DIASTOLIC BLOOD PRESSURE: 65 MMHG | BODY MASS INDEX: 54.59 KG/M2 | WEIGHT: 315 LBS

## 2017-08-21 DIAGNOSIS — I27.9 CHRONIC CARDIOPULMONARY DISEASE: ICD-10-CM

## 2017-08-21 DIAGNOSIS — E66.01 MORBID OBESITY DUE TO EXCESS CALORIES: ICD-10-CM

## 2017-08-21 DIAGNOSIS — E66.2 PICKWICKIAN SYNDROME: ICD-10-CM

## 2017-08-21 DIAGNOSIS — I27.81 COR PULMONALE: Primary | ICD-10-CM

## 2017-08-21 DIAGNOSIS — Z79.01 LONG TERM (CURRENT) USE OF ANTICOAGULANTS: ICD-10-CM

## 2017-08-21 DIAGNOSIS — I27.9 CHRONIC PULMONARY HEART DISEASE: ICD-10-CM

## 2017-08-21 DIAGNOSIS — I27.0 PRIMARY PULMONARY HYPERTENSION: ICD-10-CM

## 2017-08-21 LAB
ANION GAP SERPL CALC-SCNC: 10 MMOL/L
BNP SERPL-MCNC: <10 PG/ML
BUN SERPL-MCNC: 23 MG/DL
CALCIUM SERPL-MCNC: 8.7 MG/DL
CHLORIDE SERPL-SCNC: 99 MMOL/L
CO2 SERPL-SCNC: 29 MMOL/L
CREAT SERPL-MCNC: 1.2 MG/DL
EST. GFR  (AFRICAN AMERICAN): >60 ML/MIN/1.73 M^2
EST. GFR  (NON AFRICAN AMERICAN): >60 ML/MIN/1.73 M^2
GLUCOSE SERPL-MCNC: 99 MG/DL
INR PPP: 3
MAGNESIUM SERPL-MCNC: 1.8 MG/DL
POTASSIUM SERPL-SCNC: 3.7 MMOL/L
PROTHROMBIN TIME: 30.2 SEC
SODIUM SERPL-SCNC: 138 MMOL/L

## 2017-08-21 PROCEDURE — 85610 PROTHROMBIN TIME: CPT

## 2017-08-21 PROCEDURE — 83880 ASSAY OF NATRIURETIC PEPTIDE: CPT

## 2017-08-21 PROCEDURE — 83735 ASSAY OF MAGNESIUM: CPT

## 2017-08-21 PROCEDURE — 96366 THER/PROPH/DIAG IV INF ADDON: CPT

## 2017-08-21 PROCEDURE — 96376 TX/PRO/DX INJ SAME DRUG ADON: CPT

## 2017-08-21 PROCEDURE — 99499 UNLISTED E&M SERVICE: CPT | Mod: ,,, | Performed by: NURSE PRACTITIONER

## 2017-08-21 PROCEDURE — 63600175 PHARM REV CODE 636 W HCPCS: Performed by: NURSE PRACTITIONER

## 2017-08-21 PROCEDURE — 25000003 PHARM REV CODE 250: Performed by: NURSE PRACTITIONER

## 2017-08-21 PROCEDURE — 99213 OFFICE O/P EST LOW 20 MIN: CPT | Mod: ,,, | Performed by: NURSE PRACTITIONER

## 2017-08-21 PROCEDURE — 80048 BASIC METABOLIC PNL TOTAL CA: CPT

## 2017-08-21 PROCEDURE — 96365 THER/PROPH/DIAG IV INF INIT: CPT

## 2017-08-21 PROCEDURE — 36415 COLL VENOUS BLD VENIPUNCTURE: CPT

## 2017-08-21 RX ORDER — POTASSIUM CHLORIDE 750 MG/1
40 TABLET, EXTENDED RELEASE ORAL
Status: COMPLETED | OUTPATIENT
Start: 2017-08-21 | End: 2017-08-21

## 2017-08-21 RX ORDER — FUROSEMIDE 10 MG/ML
80 INJECTION INTRAMUSCULAR; INTRAVENOUS
Status: COMPLETED | OUTPATIENT
Start: 2017-08-21 | End: 2017-08-21

## 2017-08-21 RX ORDER — LANOLIN ALCOHOL/MO/W.PET/CERES
400 CREAM (GRAM) TOPICAL ONCE
Status: COMPLETED | OUTPATIENT
Start: 2017-08-21 | End: 2017-08-21

## 2017-08-21 RX ADMIN — FUROSEMIDE 20 MG/HR: 10 INJECTION, SOLUTION INTRAVENOUS at 08:08

## 2017-08-21 RX ADMIN — FUROSEMIDE 80 MG: 10 INJECTION, SOLUTION INTRAMUSCULAR; INTRAVENOUS at 08:08

## 2017-08-21 RX ADMIN — MAGNESIUM OXIDE TAB 400 MG (241.3 MG ELEMENTAL MG) 400 MG: 400 (241.3 MG) TAB at 10:08

## 2017-08-21 RX ADMIN — POTASSIUM CHLORIDE 40 MEQ: 750 TABLET, EXTENDED RELEASE ORAL at 10:08

## 2017-08-21 NOTE — PLAN OF CARE
Problem: Patient Care Overview  Goal: Plan of Care Review  Outcome: Ongoing (interventions implemented as appropriate)  Arrived ambulating in stable condition.Denies any discomfort /distress at this time.Voices no concerns.Wt remain stable * No order type specified * given per GRIFFIN Rooney NP and carried out.Lasix 80 mg ivp followed by lasix drip @20 cc/hr X 6 hours.Notified NP of am labs.Will continue to monitor

## 2017-08-21 NOTE — PROGRESS NOTES
Subjective:    Patient ID:  Yong Mcintyre is a 41 y.o. male who presents for follow-up of Congestive Heart Failure      Congestive Heart Failure      40 yo AAM presents for biweekly outpatient IV diuretic tx. He remains on Lasix 80 mg bid, Tracleer and Revatio and is on home O2 at 3 LPM. Has gained ~ 80# over the last year 2/2 dietary indiscretion and not going to the gym. Sees Dr. Head at [facility] once a year.    Review of Systems   Constitution: Positive for malaise/fatigue and weight gain.   HENT: Negative.    Eyes: Negative.    Cardiovascular: Positive for dyspnea on exertion and leg swelling.   Respiratory: Positive for shortness of breath and wheezing.    Endocrine: Negative.    Hematologic/Lymphatic: Negative.    Skin: Negative.    Musculoskeletal: Positive for arthritis and joint pain.   Gastrointestinal: Negative.    Genitourinary: Negative.    Neurological: Negative.    Psychiatric/Behavioral: Negative.    Allergic/Immunologic: Negative.         Objective:    Physical Exam   Constitutional: He is oriented to person, place, and time. He appears well-developed and well-nourished.   HENT:   Head: Normocephalic and atraumatic.   Eyes: Conjunctivae are normal. Pupils are equal, round, and reactive to light.   Neck: Normal range of motion. Neck supple. No thyromegaly present.   JVP mid neck   Cardiovascular: Normal rate, regular rhythm and normal heart sounds.    Pulmonary/Chest: Effort normal and breath sounds normal.   Abdominal: Soft. Bowel sounds are normal.   Musculoskeletal: He exhibits edema.   Neurological: He is alert and oriented to person, place, and time.   Skin: Skin is warm and dry.   Psychiatric: He has a normal mood and affect. His behavior is normal. Judgment and thought content normal.         Assessment:       1. Cor pulmonale    2. Chronic pulmonary heart disease    3. Primary pulmonary hypertension    4. Chronic cardiopulmonary disease    5. Long term (current) use of anticoagulants     6. Pickwickian syndrome    7. Morbid obesity due to excess calories         Plan:       Lasix 80 mg IVP, then 20 mg/hr X 6 hours.  Followed by Dr. Head at [facility].  Continue biweekly outpatient infusions.

## 2017-08-21 NOTE — PLAN OF CARE
Problem: Cardiac: Heart Failure (Adult)  Goal: Signs and Symptoms of Listed Potential Problems Will be Absent, Minimized or Managed (Cardiac: Heart Failure)  Signs and symptoms of listed potential problems will be absent, minimized or managed by discharge/transition of care (reference Cardiac: Heart Failure (Adult) CPG).   Tolerated infusion well.Urinary output total 2325 ml with net-1547.C Nevin NP here and no evening labs ordered.Supplemented with KCL 40 mEq po and Mag ox 400 mg X 1 dose.Denies any acute distress at this time.Discharge home,vss,neuro intact.Left floor ambulating in stable condition.Return in 2 weeks.Huan

## 2017-08-23 ENCOUNTER — ANTI-COAG VISIT (OUTPATIENT)
Dept: CARDIOLOGY | Facility: CLINIC | Age: 42
End: 2017-08-23

## 2017-08-23 DIAGNOSIS — Z79.01 LONG TERM CURRENT USE OF ANTICOAGULANT THERAPY: ICD-10-CM

## 2017-09-06 DIAGNOSIS — I27.0 PRIMARY PULMONARY HYPERTENSION: Primary | ICD-10-CM

## 2017-09-07 ENCOUNTER — INFUSION (OUTPATIENT)
Dept: CARDIOLOGY | Facility: HOSPITAL | Age: 42
End: 2017-09-07
Attending: INTERNAL MEDICINE
Payer: MEDICARE

## 2017-09-07 VITALS
DIASTOLIC BLOOD PRESSURE: 68 MMHG | SYSTOLIC BLOOD PRESSURE: 112 MMHG | RESPIRATION RATE: 17 BRPM | BODY MASS INDEX: 54.41 KG/M2 | OXYGEN SATURATION: 96 % | HEART RATE: 74 BPM | TEMPERATURE: 99 F | WEIGHT: 315 LBS

## 2017-09-07 DIAGNOSIS — I27.9 CHRONIC CARDIOPULMONARY DISEASE: ICD-10-CM

## 2017-09-07 DIAGNOSIS — I27.0 PRIMARY PULMONARY HYPERTENSION: ICD-10-CM

## 2017-09-07 DIAGNOSIS — E66.01 MORBID OBESITY DUE TO EXCESS CALORIES: ICD-10-CM

## 2017-09-07 DIAGNOSIS — I27.9 CHRONIC PULMONARY HEART DISEASE: ICD-10-CM

## 2017-09-07 DIAGNOSIS — I27.20 PULMONARY HYPERTENSION: Primary | ICD-10-CM

## 2017-09-07 DIAGNOSIS — Z79.01 LONG TERM (CURRENT) USE OF ANTICOAGULANTS: ICD-10-CM

## 2017-09-07 LAB
ANION GAP SERPL CALC-SCNC: 10 MMOL/L
BNP SERPL-MCNC: <10 PG/ML
BUN SERPL-MCNC: 14 MG/DL
CALCIUM SERPL-MCNC: 8.7 MG/DL
CHLORIDE SERPL-SCNC: 98 MMOL/L
CO2 SERPL-SCNC: 30 MMOL/L
CREAT SERPL-MCNC: 1.2 MG/DL
EST. GFR  (AFRICAN AMERICAN): >60 ML/MIN/1.73 M^2
EST. GFR  (NON AFRICAN AMERICAN): >60 ML/MIN/1.73 M^2
GLUCOSE SERPL-MCNC: 104 MG/DL
INR PPP: 4
MAGNESIUM SERPL-MCNC: 1.4 MG/DL
POTASSIUM SERPL-SCNC: 3.7 MMOL/L
PROTHROMBIN TIME: 40.1 SEC
SODIUM SERPL-SCNC: 138 MMOL/L

## 2017-09-07 PROCEDURE — 96365 THER/PROPH/DIAG IV INF INIT: CPT

## 2017-09-07 PROCEDURE — 83880 ASSAY OF NATRIURETIC PEPTIDE: CPT

## 2017-09-07 PROCEDURE — 85610 PROTHROMBIN TIME: CPT

## 2017-09-07 PROCEDURE — 96374 THER/PROPH/DIAG INJ IV PUSH: CPT

## 2017-09-07 PROCEDURE — 99499 UNLISTED E&M SERVICE: CPT | Mod: ,,, | Performed by: PHYSICIAN ASSISTANT

## 2017-09-07 PROCEDURE — 83735 ASSAY OF MAGNESIUM: CPT

## 2017-09-07 PROCEDURE — 80048 BASIC METABOLIC PNL TOTAL CA: CPT

## 2017-09-07 PROCEDURE — 96376 TX/PRO/DX INJ SAME DRUG ADON: CPT

## 2017-09-07 PROCEDURE — 99213 OFFICE O/P EST LOW 20 MIN: CPT | Mod: ,,, | Performed by: PHYSICIAN ASSISTANT

## 2017-09-07 PROCEDURE — 96367 TX/PROPH/DG ADDL SEQ IV INF: CPT

## 2017-09-07 PROCEDURE — 25000003 PHARM REV CODE 250: Performed by: PHYSICIAN ASSISTANT

## 2017-09-07 PROCEDURE — 63600175 PHARM REV CODE 636 W HCPCS: Performed by: PHYSICIAN ASSISTANT

## 2017-09-07 RX ORDER — POTASSIUM CHLORIDE 750 MG/1
40 TABLET, EXTENDED RELEASE ORAL
Status: COMPLETED | OUTPATIENT
Start: 2017-09-07 | End: 2017-09-07

## 2017-09-07 RX ORDER — LANOLIN ALCOHOL/MO/W.PET/CERES
800 CREAM (GRAM) TOPICAL ONCE
Status: COMPLETED | OUTPATIENT
Start: 2017-09-07 | End: 2017-09-07

## 2017-09-07 RX ORDER — FUROSEMIDE 10 MG/ML
80 INJECTION INTRAMUSCULAR; INTRAVENOUS
Status: COMPLETED | OUTPATIENT
Start: 2017-09-07 | End: 2017-09-07

## 2017-09-07 RX ADMIN — POTASSIUM CHLORIDE 40 MEQ: 750 TABLET, EXTENDED RELEASE ORAL at 11:09

## 2017-09-07 RX ADMIN — Medication 800 MG: at 11:09

## 2017-09-07 RX ADMIN — FUROSEMIDE 80 MG: 10 INJECTION, SOLUTION INTRAMUSCULAR; INTRAVENOUS at 08:09

## 2017-09-07 RX ADMIN — FUROSEMIDE 20 MG/HR: 10 INJECTION, SOLUTION INTRAMUSCULAR; INTRAVENOUS at 08:09

## 2017-09-07 NOTE — PLAN OF CARE
Problem: Cardiac: Heart Failure (Adult)  Goal: Signs and Symptoms of Listed Potential Problems Will be Absent, Minimized or Managed (Cardiac: Heart Failure)  Signs and symptoms of listed potential problems will be absent, minimized or managed by discharge/transition of care (reference Cardiac: Heart Failure (Adult) CPG).   Outcome: Ongoing (interventions implemented as appropriate)  Coumadin clinic will call pt with orders with warfarin dose.

## 2017-09-07 NOTE — PLAN OF CARE
Problem: Cardiac: Heart Failure (Adult)  Goal: Signs and Symptoms of Listed Potential Problems Will be Absent, Minimized or Managed (Cardiac: Heart Failure)  Signs and symptoms of listed potential problems will be absent, minimized or managed by discharge/transition of care (reference Cardiac: Heart Failure (Adult) CPG).   Outcome: Ongoing (interventions implemented as appropriate)  Tolerated infusion well.Urinary output total 2350 ml with net -1672.Denies any acuate distress at this time.Reported not taking his Mag OX side for couple days because he needed a refilled.Reinforced the importance of following the MD medication regimen.Discharge home,vss,neuro intact.Left floor ambulating stable condition.return in 2 weeks.Huan

## 2017-09-07 NOTE — PLAN OF CARE
Problem: Patient Care Overview  Goal: Plan of Care Review  Outcome: Ongoing (interventions implemented as appropriate)  Arrived in stable condition today.reported he just drove from [place] last night and he's sleep.Wt remains stable.Orders given and carried out.Lasix 80 mg ivp followed by lasix drip @ 20 cc/hr x 6 hours.Pa here and notified of am labs.New orders given to supplement with KCL 80 mg and Mag ox 800 mg X 1 dose.No evening labs orders.Will continue to monitor

## 2017-09-08 ENCOUNTER — ANTI-COAG VISIT (OUTPATIENT)
Dept: CARDIOLOGY | Facility: CLINIC | Age: 42
End: 2017-09-08

## 2017-09-08 DIAGNOSIS — Z79.01 LONG TERM CURRENT USE OF ANTICOAGULANT THERAPY: ICD-10-CM

## 2017-09-08 NOTE — PROGRESS NOTES
Patient questioned and confirmed correct dose .  Reports taking Tylenol OTC .  No other changes reported.

## 2017-09-11 NOTE — PROGRESS NOTES
Subjective:    Patient ID:  Yong Mcintyre is a 41 y.o. male who presents for follow-up of CHF    HPI  42 yo AAM presents for biweekly outpatient IV diuretic tx. He remains on Lasix 80 mg bid, Tracleer and Revatio and is on home O2 at 3 LPM. Sees Dr. Head at [facility] once a year. No new complaints today.    Review of Systems   Constitution: Positive for weight gain.   Cardiovascular: Positive for leg swelling. Negative for dyspnea on exertion.   Respiratory: Negative for shortness of breath.    Gastrointestinal: Negative for nausea and vomiting.   Neurological: Negative for dizziness and light-headedness.        Objective:    Physical Exam   Constitutional: He is oriented to person, place, and time. He appears well-developed and well-nourished.   /68 (BP Location: Right arm, Patient Position: Sitting)   Pulse 74   Temp 98.6 °F (37 °C) (Oral)   Resp 17   Wt (!) 148.3 kg (326 lb 15.1 oz)   SpO2 96%   BMI 54.41 kg/m²      Neck: JVD present.   Cardiovascular: Normal rate, regular rhythm and normal heart sounds.    Pulses:       Radial pulses are 2+ on the right side, and 2+ on the left side.   Pulmonary/Chest: Effort normal and breath sounds normal.   Abdominal: Soft. Bowel sounds are normal. There is no tenderness.   Musculoskeletal: He exhibits edema.   Neurological: He is alert and oriented to person, place, and time.   Skin: Skin is warm and dry.         Assessment:       1. Pulmonary hypertension    2. Chronic pulmonary heart disease    3. Primary pulmonary hypertension    4. Chronic cardiopulmonary disease    5. Long term (current) use of anticoagulants    6. Morbid obesity due to excess calories         Plan:       Lasix 80 mg IVP, then 20 mg/hr X 6 hours.  Followed by Dr. Head at [facility].  Continue biweekly outpatient infusions.

## 2017-09-12 RX ORDER — FLUTICASONE PROPIONATE AND SALMETEROL 50; 250 UG/1; UG/1
POWDER RESPIRATORY (INHALATION)
Qty: 60 EACH | Refills: 3 | Status: SHIPPED | OUTPATIENT
Start: 2017-09-12 | End: 2021-03-07 | Stop reason: SDUPTHER

## 2017-09-15 DIAGNOSIS — I27.0 PRIMARY PULMONARY HYPERTENSION: Primary | ICD-10-CM

## 2017-09-18 ENCOUNTER — ANTI-COAG VISIT (OUTPATIENT)
Dept: CARDIOLOGY | Facility: CLINIC | Age: 42
End: 2017-09-18

## 2017-09-18 ENCOUNTER — INFUSION (OUTPATIENT)
Dept: CARDIOLOGY | Facility: HOSPITAL | Age: 42
End: 2017-09-18
Attending: INTERNAL MEDICINE
Payer: MEDICARE

## 2017-09-18 VITALS
HEART RATE: 79 BPM | BODY MASS INDEX: 54.96 KG/M2 | RESPIRATION RATE: 15 BRPM | OXYGEN SATURATION: 96 % | WEIGHT: 315 LBS | SYSTOLIC BLOOD PRESSURE: 137 MMHG | TEMPERATURE: 99 F | DIASTOLIC BLOOD PRESSURE: 83 MMHG

## 2017-09-18 DIAGNOSIS — I27.9 CHRONIC PULMONARY HEART DISEASE: ICD-10-CM

## 2017-09-18 DIAGNOSIS — I27.9 CHRONIC CARDIOPULMONARY DISEASE: ICD-10-CM

## 2017-09-18 DIAGNOSIS — Z79.01 LONG TERM (CURRENT) USE OF ANTICOAGULANTS: ICD-10-CM

## 2017-09-18 DIAGNOSIS — I27.0 PRIMARY PULMONARY HYPERTENSION: ICD-10-CM

## 2017-09-18 DIAGNOSIS — Z79.01 LONG TERM CURRENT USE OF ANTICOAGULANT THERAPY: ICD-10-CM

## 2017-09-18 LAB
ALBUMIN SERPL BCP-MCNC: 3.1 G/DL
ALP SERPL-CCNC: 129 U/L
ALT SERPL W/O P-5'-P-CCNC: 20 U/L
ANION GAP SERPL CALC-SCNC: 8 MMOL/L
AST SERPL-CCNC: 22 U/L
BILIRUB DIRECT SERPL-MCNC: 0.3 MG/DL
BILIRUB SERPL-MCNC: 0.7 MG/DL
BNP SERPL-MCNC: 12 PG/ML
BUN SERPL-MCNC: 15 MG/DL
CALCIUM SERPL-MCNC: 9.1 MG/DL
CHLORIDE SERPL-SCNC: 97 MMOL/L
CO2 SERPL-SCNC: 36 MMOL/L
CREAT SERPL-MCNC: 1.1 MG/DL
EST. GFR  (AFRICAN AMERICAN): >60 ML/MIN/1.73 M^2
EST. GFR  (NON AFRICAN AMERICAN): >60 ML/MIN/1.73 M^2
GLUCOSE SERPL-MCNC: 88 MG/DL
INR PPP: 2.2
MAGNESIUM SERPL-MCNC: 1.9 MG/DL
POTASSIUM SERPL-SCNC: 3.8 MMOL/L
PROT SERPL-MCNC: 8.4 G/DL
PROTHROMBIN TIME: 21.9 SEC
SODIUM SERPL-SCNC: 141 MMOL/L

## 2017-09-18 PROCEDURE — 96365 THER/PROPH/DIAG IV INF INIT: CPT

## 2017-09-18 PROCEDURE — 99499 UNLISTED E&M SERVICE: CPT | Mod: ,,, | Performed by: PHYSICIAN ASSISTANT

## 2017-09-18 PROCEDURE — 63600175 PHARM REV CODE 636 W HCPCS: Performed by: PHYSICIAN ASSISTANT

## 2017-09-18 PROCEDURE — 80076 HEPATIC FUNCTION PANEL: CPT

## 2017-09-18 PROCEDURE — 80048 BASIC METABOLIC PNL TOTAL CA: CPT

## 2017-09-18 PROCEDURE — 25000003 PHARM REV CODE 250: Performed by: PHYSICIAN ASSISTANT

## 2017-09-18 PROCEDURE — 96366 THER/PROPH/DIAG IV INF ADDON: CPT

## 2017-09-18 PROCEDURE — 96376 TX/PRO/DX INJ SAME DRUG ADON: CPT

## 2017-09-18 PROCEDURE — 99213 OFFICE O/P EST LOW 20 MIN: CPT | Mod: ,,, | Performed by: PHYSICIAN ASSISTANT

## 2017-09-18 PROCEDURE — 85610 PROTHROMBIN TIME: CPT

## 2017-09-18 PROCEDURE — 83880 ASSAY OF NATRIURETIC PEPTIDE: CPT

## 2017-09-18 PROCEDURE — 83735 ASSAY OF MAGNESIUM: CPT

## 2017-09-18 RX ORDER — FUROSEMIDE 10 MG/ML
80 INJECTION INTRAMUSCULAR; INTRAVENOUS
Status: COMPLETED | OUTPATIENT
Start: 2017-09-18 | End: 2017-09-18

## 2017-09-18 RX ADMIN — FUROSEMIDE 80 MG: 10 INJECTION, SOLUTION INTRAMUSCULAR; INTRAVENOUS at 08:09

## 2017-09-18 RX ADMIN — FUROSEMIDE 20 MG/HR: 10 INJECTION, SOLUTION INTRAVENOUS at 08:09

## 2017-09-18 NOTE — PLAN OF CARE
Problem: Cardiac: Heart Failure (Adult)  Goal: Signs and Symptoms of Listed Potential Problems Will be Absent, Minimized or Managed (Cardiac: Heart Failure)  Signs and symptoms of listed potential problems will be absent, minimized or managed by discharge/transition of care (reference Cardiac: Heart Failure (Adult) CPG).   Outcome: Ongoing (interventions implemented as appropriate)  No pm labs ordered per PA.Tolerated infusion well with urine output total 2800 ml net -2022.Denies any acute distress at this time.Stated I'm feeling good today.Feels these infusion are helping.Discharge home vss,neuro intact.Left floor ambulating in stable condition.Return in 2 weeks.Huan

## 2017-09-18 NOTE — PLAN OF CARE
Problem: Patient Care Overview  Goal: Plan of Care Review  Outcome: Ongoing (interventions implemented as appropriate)  Arrived in stable condition,Voices no concerns today.Wt up 1.5 kg x 2 weeks.Orders given per S Sukhjinder BROWN and carried out.Lasix 80 mg ivp followed by lasix drip @20 cc/hr x 6 hours.Will continue to monitor

## 2017-09-18 NOTE — PROGRESS NOTES
Subjective:    Patient ID:  Yong Mcintyre is a 41 y.o. male who presents for follow-up of CHF    HPI  40 yo AAM presents for biweekly outpatient IV diuretic tx. He remains on Lasix 80 mg bid, Tracleer and Revatio and is on home O2 at 3 LPM. Sees Dr. Head at [facility] once a year. No new complaints today.  -weight up since last week.    Review of Systems   Constitution: Positive for weight gain.   Cardiovascular: Positive for leg swelling. Negative for dyspnea on exertion.   Respiratory: Negative for shortness of breath.    Gastrointestinal: Negative for nausea and vomiting.   Neurological: Negative for dizziness and light-headedness.        Objective:    Physical Exam   Constitutional: He is oriented to person, place, and time. He appears well-developed and well-nourished.   /68 (BP Location: Right arm, Patient Position: Sitting)   Pulse 74   Temp 98.6 °F (37 °C) (Oral)   Resp 17   Wt (!) 148.3 kg (326 lb 15.1 oz)   SpO2 96%   BMI 54.41 kg/m²      Neck: JVD present.   Cardiovascular: Normal rate, regular rhythm and normal heart sounds.    Pulses:       Radial pulses are 2+ on the right side, and 2+ on the left side.   Pulmonary/Chest: Effort normal and breath sounds normal.   Abdominal: Soft. Bowel sounds are normal. There is no tenderness.   Musculoskeletal: He exhibits edema.   Neurological: He is alert and oriented to person, place, and time.   Skin: Skin is warm and dry.         Assessment:       1. Chronic pulmonary heart disease    2. Primary pulmonary hypertension    3. Chronic cardiopulmonary disease    4. Long term (current) use of anticoagulants        Plan:     Lasix 80 mg IVP, then 20 mg/hr X 6 hours.  Followed by Dr. Head at [facility].  Continue biweekly outpatient infusions.

## 2017-09-29 DIAGNOSIS — I27.0 PRIMARY PULMONARY HYPERTENSION: Primary | ICD-10-CM

## 2017-10-13 DIAGNOSIS — I27.0 PRIMARY PULMONARY HYPERTENSION: Primary | ICD-10-CM

## 2017-10-13 DIAGNOSIS — I27.0 PRIMARY PULMONARY HYPERTENSION: ICD-10-CM

## 2017-10-16 ENCOUNTER — INFUSION (OUTPATIENT)
Dept: CARDIOLOGY | Facility: HOSPITAL | Age: 42
End: 2017-10-16
Attending: INTERNAL MEDICINE
Payer: MEDICARE

## 2017-10-16 VITALS
HEART RATE: 98 BPM | DIASTOLIC BLOOD PRESSURE: 75 MMHG | OXYGEN SATURATION: 96 % | RESPIRATION RATE: 22 BRPM | SYSTOLIC BLOOD PRESSURE: 119 MMHG | TEMPERATURE: 98 F | WEIGHT: 315 LBS | BODY MASS INDEX: 55.84 KG/M2

## 2017-10-16 DIAGNOSIS — I27.9 CHRONIC CARDIOPULMONARY DISEASE: ICD-10-CM

## 2017-10-16 DIAGNOSIS — I27.9 CHRONIC PULMONARY HEART DISEASE: ICD-10-CM

## 2017-10-16 DIAGNOSIS — I27.0 PRIMARY PULMONARY HYPERTENSION: ICD-10-CM

## 2017-10-16 LAB
ALBUMIN SERPL BCP-MCNC: 3.1 G/DL
ALP SERPL-CCNC: 140 U/L
ALT SERPL W/O P-5'-P-CCNC: 26 U/L
ANION GAP SERPL CALC-SCNC: 12 MMOL/L
AST SERPL-CCNC: 28 U/L
BILIRUB DIRECT SERPL-MCNC: 0.3 MG/DL
BILIRUB SERPL-MCNC: 0.7 MG/DL
BNP SERPL-MCNC: <10 PG/ML
BUN SERPL-MCNC: 19 MG/DL
CALCIUM SERPL-MCNC: 9.2 MG/DL
CHLORIDE SERPL-SCNC: 94 MMOL/L
CO2 SERPL-SCNC: 33 MMOL/L
CREAT SERPL-MCNC: 1.2 MG/DL
EST. GFR  (AFRICAN AMERICAN): >60 ML/MIN/1.73 M^2
EST. GFR  (NON AFRICAN AMERICAN): >60 ML/MIN/1.73 M^2
GLUCOSE SERPL-MCNC: 125 MG/DL
INR PPP: 2.5
MAGNESIUM SERPL-MCNC: 1.7 MG/DL
POTASSIUM SERPL-SCNC: 3.4 MMOL/L
PROT SERPL-MCNC: 8.7 G/DL
PROTHROMBIN TIME: 24.8 SEC
SODIUM SERPL-SCNC: 139 MMOL/L

## 2017-10-16 PROCEDURE — 63600175 PHARM REV CODE 636 W HCPCS: Performed by: PHYSICIAN ASSISTANT

## 2017-10-16 PROCEDURE — 25000003 PHARM REV CODE 250: Performed by: PHYSICIAN ASSISTANT

## 2017-10-16 PROCEDURE — 96366 THER/PROPH/DIAG IV INF ADDON: CPT

## 2017-10-16 PROCEDURE — 80048 BASIC METABOLIC PNL TOTAL CA: CPT

## 2017-10-16 PROCEDURE — 83880 ASSAY OF NATRIURETIC PEPTIDE: CPT

## 2017-10-16 PROCEDURE — 85610 PROTHROMBIN TIME: CPT

## 2017-10-16 PROCEDURE — 96376 TX/PRO/DX INJ SAME DRUG ADON: CPT

## 2017-10-16 PROCEDURE — 96365 THER/PROPH/DIAG IV INF INIT: CPT

## 2017-10-16 PROCEDURE — 80076 HEPATIC FUNCTION PANEL: CPT

## 2017-10-16 PROCEDURE — 83735 ASSAY OF MAGNESIUM: CPT

## 2017-10-16 PROCEDURE — 99499 UNLISTED E&M SERVICE: CPT | Mod: ,,, | Performed by: PHYSICIAN ASSISTANT

## 2017-10-16 RX ORDER — FUROSEMIDE 10 MG/ML
80 INJECTION INTRAMUSCULAR; INTRAVENOUS
Status: COMPLETED | OUTPATIENT
Start: 2017-10-16 | End: 2017-10-16

## 2017-10-16 RX ORDER — POTASSIUM CHLORIDE 750 MG/1
40 TABLET, EXTENDED RELEASE ORAL ONCE
Status: COMPLETED | OUTPATIENT
Start: 2017-10-16 | End: 2017-10-16

## 2017-10-16 RX ORDER — POTASSIUM CHLORIDE 750 MG/1
20 TABLET, EXTENDED RELEASE ORAL ONCE
Status: COMPLETED | OUTPATIENT
Start: 2017-10-16 | End: 2017-10-16

## 2017-10-16 RX ADMIN — POTASSIUM CHLORIDE 40 MEQ: 750 TABLET, EXTENDED RELEASE ORAL at 12:10

## 2017-10-16 RX ADMIN — FUROSEMIDE 20 MG/HR: 10 INJECTION, SOLUTION INTRAVENOUS at 07:10

## 2017-10-16 RX ADMIN — POTASSIUM CHLORIDE 20 MEQ: 750 TABLET, EXTENDED RELEASE ORAL at 12:10

## 2017-10-16 RX ADMIN — FUROSEMIDE 80 MG: 10 INJECTION, SOLUTION INTRAMUSCULAR; INTRAVENOUS at 09:10

## 2017-10-16 NOTE — PLAN OF CARE
Problem: Patient Care Overview  Goal: Plan of Care Review  Outcome: Ongoing (interventions implemented as appropriate)  Arrived in stable condition.Denies any acute distress at this time.Missed last appointment here in [place].Wt up 3.4kg.orders given per S Davin PA and carried out.IV started and labs sent.lasix 80 mg ivp and lasix drip @20 cc/hr x 6 hours.Notified PA of am labs cr+1.2,K+ 3.4 and Mag+1.7.Supplemented with Kcl 60 mEq po x 1 dose and pt took own mag ox.Will continue to monitor

## 2017-10-16 NOTE — PLAN OF CARE
Problem: Cardiac: Heart Failure (Adult)  Goal: Signs and Symptoms of Listed Potential Problems Will be Absent, Minimized or Managed (Cardiac: Heart Failure)  Signs and symptoms of listed potential problems will be absent, minimized or managed by discharge/transition of care (reference Cardiac: Heart Failure (Adult) CPG).   Pa here and no pm labs ordered. Reinforced low sodium diet with 1500 cc fluid restriction.Tolerated infusion well AEB urine output total 2175 ml with net -1297.Discharge home,vss,neuro intact.Denies any acute distress at this time.Left floor ambulating  in stable condition.Return in 2 weeks.Huan

## 2017-10-18 NOTE — PROGRESS NOTES
Subjective:    Patient ID:  Yong Mcintyre is a 42 y.o. male who presents for follow-up of CHF    HPI  40 yo AAM presents for biweekly outpatient IV diuretic tx. He remains on Lasix 80 mg bid, Tracleer and Revatio and is on home O2 at 3 LPM. Sees Dr. Head at [facility] once a year. No new complaints today.  -weight up: 152<-149    Review of Systems   Constitution: Positive for weight gain.   Cardiovascular: Positive for leg swelling. Negative for dyspnea on exertion.   Respiratory: Negative for shortness of breath.    Gastrointestinal: Negative for nausea and vomiting.   Neurological: Negative for dizziness and light-headedness.        Objective:    Physical Exam   Constitutional: He is oriented to person, place, and time. He appears well-developed and well-nourished.   /68 (BP Location: Right arm, Patient Position: Sitting)   Pulse 74   Temp 98.6 °F (37 °C) (Oral)   Resp 17   Wt (!) 148.3 kg (326 lb 15.1 oz)   SpO2 96%   BMI 54.41 kg/m²      Neck: JVD present.   Cardiovascular: Normal rate, regular rhythm and normal heart sounds.    Pulses:       Radial pulses are 2+ on the right side, and 2+ on the left side.   Pulmonary/Chest: Effort normal and breath sounds normal.   Abdominal: Soft. Bowel sounds are normal. There is no tenderness.   Musculoskeletal: He exhibits edema.   Neurological: He is alert and oriented to person, place, and time.   Skin: Skin is warm and dry.         Assessment:       1. Chronic pulmonary heart disease    2. Primary pulmonary hypertension    3. Chronic cardiopulmonary disease        Plan:     Lasix 80 mg IVP, then 20 mg/hr X 6 hours.  Followed by Dr. Head at [facility].  Continue biweekly outpatient infusions.

## 2017-10-20 ENCOUNTER — ANTI-COAG VISIT (OUTPATIENT)
Dept: CARDIOLOGY | Facility: CLINIC | Age: 42
End: 2017-10-20

## 2017-10-20 DIAGNOSIS — Z79.01 LONG TERM CURRENT USE OF ANTICOAGULANT THERAPY: ICD-10-CM

## 2017-10-27 DIAGNOSIS — I27.0 PRIMARY PULMONARY HYPERTENSION: Primary | ICD-10-CM

## 2017-10-30 ENCOUNTER — INFUSION (OUTPATIENT)
Dept: CARDIOLOGY | Facility: HOSPITAL | Age: 42
End: 2017-10-30
Attending: INTERNAL MEDICINE
Payer: MEDICARE

## 2017-10-30 VITALS
OXYGEN SATURATION: 96 % | HEART RATE: 103 BPM | RESPIRATION RATE: 26 BRPM | BODY MASS INDEX: 55.95 KG/M2 | TEMPERATURE: 98 F | WEIGHT: 315 LBS | DIASTOLIC BLOOD PRESSURE: 76 MMHG | SYSTOLIC BLOOD PRESSURE: 115 MMHG

## 2017-10-30 DIAGNOSIS — I27.9 CHRONIC PULMONARY HEART DISEASE: ICD-10-CM

## 2017-10-30 DIAGNOSIS — I27.0 PRIMARY PULMONARY HYPERTENSION: ICD-10-CM

## 2017-10-30 DIAGNOSIS — Z79.01 LONG TERM CURRENT USE OF ANTICOAGULANT THERAPY: ICD-10-CM

## 2017-10-30 DIAGNOSIS — I27.9 CHRONIC CARDIOPULMONARY DISEASE: ICD-10-CM

## 2017-10-30 LAB
ANION GAP SERPL CALC-SCNC: 8 MMOL/L
BNP SERPL-MCNC: <10 PG/ML
BUN SERPL-MCNC: 20 MG/DL
CALCIUM SERPL-MCNC: 9.5 MG/DL
CHLORIDE SERPL-SCNC: 90 MMOL/L
CO2 SERPL-SCNC: 40 MMOL/L
CREAT SERPL-MCNC: 1.2 MG/DL
EST. GFR  (AFRICAN AMERICAN): >60 ML/MIN/1.73 M^2
EST. GFR  (NON AFRICAN AMERICAN): >60 ML/MIN/1.73 M^2
GLUCOSE SERPL-MCNC: 98 MG/DL
INR PPP: 1.4
MAGNESIUM SERPL-MCNC: 1.9 MG/DL
POTASSIUM SERPL-SCNC: 3.8 MMOL/L
PROTHROMBIN TIME: 13.9 SEC
SODIUM SERPL-SCNC: 138 MMOL/L

## 2017-10-30 PROCEDURE — 85610 PROTHROMBIN TIME: CPT

## 2017-10-30 PROCEDURE — 25000003 PHARM REV CODE 250: Performed by: NURSE PRACTITIONER

## 2017-10-30 PROCEDURE — 80048 BASIC METABOLIC PNL TOTAL CA: CPT

## 2017-10-30 PROCEDURE — 25000003 PHARM REV CODE 250: Performed by: PHYSICIAN ASSISTANT

## 2017-10-30 PROCEDURE — 96366 THER/PROPH/DIAG IV INF ADDON: CPT

## 2017-10-30 PROCEDURE — 83735 ASSAY OF MAGNESIUM: CPT

## 2017-10-30 PROCEDURE — 96365 THER/PROPH/DIAG IV INF INIT: CPT

## 2017-10-30 PROCEDURE — 63600175 PHARM REV CODE 636 W HCPCS: Performed by: PHYSICIAN ASSISTANT

## 2017-10-30 PROCEDURE — 99214 OFFICE O/P EST MOD 30 MIN: CPT | Mod: ,,, | Performed by: INTERNAL MEDICINE

## 2017-10-30 PROCEDURE — 63600175 PHARM REV CODE 636 W HCPCS: Performed by: NURSE PRACTITIONER

## 2017-10-30 PROCEDURE — 83880 ASSAY OF NATRIURETIC PEPTIDE: CPT

## 2017-10-30 PROCEDURE — 96376 TX/PRO/DX INJ SAME DRUG ADON: CPT

## 2017-10-30 PROCEDURE — 99499 UNLISTED E&M SERVICE: CPT | Mod: ,,, | Performed by: PHYSICIAN ASSISTANT

## 2017-10-30 RX ORDER — POTASSIUM CHLORIDE 750 MG/1
40 TABLET, EXTENDED RELEASE ORAL ONCE
Status: COMPLETED | OUTPATIENT
Start: 2017-10-30 | End: 2017-10-30

## 2017-10-30 RX ORDER — FUROSEMIDE 10 MG/ML
80 INJECTION INTRAMUSCULAR; INTRAVENOUS ONCE
Status: COMPLETED | OUTPATIENT
Start: 2017-10-30 | End: 2017-10-30

## 2017-10-30 RX ADMIN — SODIUM CHLORIDE 20 MG/HR: 900 INJECTION, SOLUTION INTRAVENOUS at 08:10

## 2017-10-30 RX ADMIN — FUROSEMIDE 80 MG: 10 INJECTION, SOLUTION INTRAMUSCULAR; INTRAVENOUS at 09:10

## 2017-10-30 RX ADMIN — POTASSIUM CHLORIDE 40 MEQ: 750 TABLET, EXTENDED RELEASE ORAL at 11:10

## 2017-10-30 NOTE — PLAN OF CARE
Problem: Cardiac: Heart Failure (Adult)  Goal: Signs and Symptoms of Listed Potential Problems Will be Absent, Minimized or Managed (Cardiac: Heart Failure)  Signs and symptoms of listed potential problems will be absent, minimized or managed by discharge/transition of care (reference Cardiac: Heart Failure (Adult) CPG).   Outcome: Ongoing (interventions implemented as appropriate)  Arrived from home ambulating in stable condition.Denies any acute distress at this time. C/o allergies acting up now.Wt up 0.3 from 152.2 to 152.5 kg this visit.Orders given per BRIDGETT Lester and carried out.Iv started and labs sent.Lasix 80 mg ivp followed by lasix drip @20 cc/hr  x 6 hours.PA here,notified of am labs and no pm labs ordered.Supplemented with KCL 40 mEq po x 1 dose.Reinforced medication regimen.Tolerated infusion well.Urine output total 2075 ml with net -1322.Discharge home,vss,neuro intact.Denies any acute distress/concerns at this time.Left floor in stable.Return in 2 weeks.Huan

## 2017-10-30 NOTE — PROGRESS NOTES
Subjective:    Patient ID:  Yong Mcintyre is a 42 y.o. male who presents for follow-up of CHF    HPI  40 yo AAM presents for biweekly outpatient IV diuretic tx. He remains on Lasix 80 mg bid, Tracleer and Revatio and is on home O2 at 3 LPM. He has been having seasonal allergies with the weather changing which has caused some wheezing with exertion. He is trying to get back on an exercise regimen. Having difficulty sleeping at night, takes zzquil every night but it doesn't work well all the time.     Review of Systems   Constitution: Positive for weight gain.   Cardiovascular: Positive for leg swelling. Negative for dyspnea on exertion.   Respiratory: Negative for shortness of breath.    Gastrointestinal: Negative for nausea and vomiting.   Neurological: Negative for dizziness and light-headedness.        Objective:    Physical Exam   Constitutional: He is oriented to person, place, and time. He appears well-developed and well-nourished.   /68 (BP Location: Right arm, Patient Position: Sitting)   Pulse 74   Temp 98.6 °F (37 °C) (Oral)   Resp 17   Wt (!) 148.3 kg (326 lb 15.1 oz)   SpO2 96%   BMI 54.41 kg/m²      Neck: Normal range of motion. Neck supple. JVD present.   Cardiovascular: Normal rate, regular rhythm and normal heart sounds.    Pulses:       Radial pulses are 2+ on the right side, and 2+ on the left side.   Pulmonary/Chest: Effort normal and breath sounds normal.   Abdominal: Soft. Bowel sounds are normal. There is no tenderness.   Musculoskeletal: He exhibits edema.   Neurological: He is alert and oriented to person, place, and time.   Skin: Skin is warm and dry.   Psychiatric: He has a normal mood and affect. His behavior is normal. Judgment and thought content normal.         Assessment:       1. Chronic pulmonary heart disease    2. Primary pulmonary hypertension    3. Chronic cardiopulmonary disease    4. Long term current use of anticoagulant therapy        Plan:     Lasix 80 mg IVP,  then 20 mg/hr X 6 hours.  Followed by Dr. Head at [facility] in December for wheezing  Discussed trying melatonin at home for sleep  Continue biweekly outpatient infusions.

## 2017-11-10 DIAGNOSIS — I27.0 PRIMARY PULMONARY HYPERTENSION: Primary | ICD-10-CM

## 2017-11-13 ENCOUNTER — INFUSION (OUTPATIENT)
Dept: CARDIOLOGY | Facility: HOSPITAL | Age: 42
End: 2017-11-13
Attending: INTERNAL MEDICINE
Payer: MEDICARE

## 2017-11-13 VITALS
RESPIRATION RATE: 22 BRPM | BODY MASS INDEX: 56.17 KG/M2 | DIASTOLIC BLOOD PRESSURE: 70 MMHG | TEMPERATURE: 98 F | SYSTOLIC BLOOD PRESSURE: 131 MMHG | OXYGEN SATURATION: 96 % | WEIGHT: 315 LBS | HEART RATE: 89 BPM

## 2017-11-13 DIAGNOSIS — I27.9 CHRONIC PULMONARY HEART DISEASE: ICD-10-CM

## 2017-11-13 DIAGNOSIS — I27.9 CHRONIC CARDIOPULMONARY DISEASE: ICD-10-CM

## 2017-11-13 DIAGNOSIS — I27.0 PRIMARY PULMONARY HYPERTENSION: ICD-10-CM

## 2017-11-13 DIAGNOSIS — Z79.01 LONG TERM CURRENT USE OF ANTICOAGULANT THERAPY: ICD-10-CM

## 2017-11-13 LAB
ALBUMIN SERPL BCP-MCNC: 2.9 G/DL
ALP SERPL-CCNC: 127 U/L
ALT SERPL W/O P-5'-P-CCNC: 22 U/L
ANION GAP SERPL CALC-SCNC: 7 MMOL/L
AST SERPL-CCNC: 25 U/L
BILIRUB DIRECT SERPL-MCNC: 0.3 MG/DL
BILIRUB SERPL-MCNC: 0.8 MG/DL
BNP SERPL-MCNC: <10 PG/ML
BUN SERPL-MCNC: 17 MG/DL
CALCIUM SERPL-MCNC: 8.9 MG/DL
CHLORIDE SERPL-SCNC: 97 MMOL/L
CO2 SERPL-SCNC: 35 MMOL/L
CREAT SERPL-MCNC: 1.1 MG/DL
EST. GFR  (AFRICAN AMERICAN): >60 ML/MIN/1.73 M^2
EST. GFR  (NON AFRICAN AMERICAN): >60 ML/MIN/1.73 M^2
GLUCOSE SERPL-MCNC: 99 MG/DL
INR PPP: 2.3
MAGNESIUM SERPL-MCNC: 1.6 MG/DL
POTASSIUM SERPL-SCNC: 3.7 MMOL/L
PROT SERPL-MCNC: 7.9 G/DL
PROTHROMBIN TIME: 22.6 SEC
SODIUM SERPL-SCNC: 139 MMOL/L

## 2017-11-13 PROCEDURE — 80048 BASIC METABOLIC PNL TOTAL CA: CPT

## 2017-11-13 PROCEDURE — 83735 ASSAY OF MAGNESIUM: CPT

## 2017-11-13 PROCEDURE — 63600175 PHARM REV CODE 636 W HCPCS: Performed by: PHYSICIAN ASSISTANT

## 2017-11-13 PROCEDURE — 25000003 PHARM REV CODE 250: Performed by: PHYSICIAN ASSISTANT

## 2017-11-13 PROCEDURE — 96366 THER/PROPH/DIAG IV INF ADDON: CPT

## 2017-11-13 PROCEDURE — 80076 HEPATIC FUNCTION PANEL: CPT

## 2017-11-13 PROCEDURE — 85610 PROTHROMBIN TIME: CPT

## 2017-11-13 PROCEDURE — 96365 THER/PROPH/DIAG IV INF INIT: CPT

## 2017-11-13 PROCEDURE — 63600175 PHARM REV CODE 636 W HCPCS: Performed by: NURSE PRACTITIONER

## 2017-11-13 PROCEDURE — 99213 OFFICE O/P EST LOW 20 MIN: CPT | Mod: ,,, | Performed by: INTERNAL MEDICINE

## 2017-11-13 PROCEDURE — 99499 UNLISTED E&M SERVICE: CPT | Mod: ,,, | Performed by: NURSE PRACTITIONER

## 2017-11-13 PROCEDURE — 83880 ASSAY OF NATRIURETIC PEPTIDE: CPT

## 2017-11-13 PROCEDURE — 96376 TX/PRO/DX INJ SAME DRUG ADON: CPT

## 2017-11-13 RX ORDER — FUROSEMIDE 10 MG/ML
80 INJECTION INTRAMUSCULAR; INTRAVENOUS
Status: COMPLETED | OUTPATIENT
Start: 2017-11-13 | End: 2017-11-13

## 2017-11-13 RX ADMIN — FUROSEMIDE 20 MG/HR: 10 INJECTION, SOLUTION INTRAVENOUS at 08:11

## 2017-11-13 RX ADMIN — FUROSEMIDE 80 MG: 10 INJECTION, SOLUTION INTRAMUSCULAR; INTRAVENOUS at 08:11

## 2017-11-13 NOTE — PROGRESS NOTES
Subjective:    Patient ID:  Yong Mcintyre is a 42 y.o. male who presents for follow-up of CHF    HPI  42 yo AAM presents for biweekly outpatient IV diuretic tx. He remains on Lasix 80 mg bid, Tracleer and Revatio and is on home O2 at 3 LPM. He is trying to get back on an exercise regimen/back in the gym.     Review of Systems   Constitution: Positive for weight gain.   Cardiovascular: Positive for leg swelling. Negative for dyspnea on exertion.   Respiratory: Negative for shortness of breath.    Gastrointestinal: Negative for nausea and vomiting.   Neurological: Negative for dizziness and light-headedness.        Objective:    Physical Exam   Constitutional: He is oriented to person, place, and time. He appears well-developed and well-nourished.   /68 (BP Location: Right arm, Patient Position: Sitting)   Pulse 74   Temp 98.6 °F (37 °C) (Oral)   Resp 17   Wt (!) 148.3 kg (326 lb 15.1 oz)   SpO2 96%   BMI 54.41 kg/m²      Neck: Normal range of motion. Neck supple. JVD present.   Cardiovascular: Normal rate, regular rhythm and normal heart sounds.    Pulses:       Radial pulses are 2+ on the right side, and 2+ on the left side.   Pulmonary/Chest: Effort normal and breath sounds normal.   Abdominal: Soft. Bowel sounds are normal. There is no tenderness.   Musculoskeletal: He exhibits edema.   Neurological: He is alert and oriented to person, place, and time.   Skin: Skin is warm and dry.   Psychiatric: He has a normal mood and affect. His behavior is normal. Judgment and thought content normal.         Assessment:       1. Chronic pulmonary heart disease    2. Primary pulmonary hypertension    3. Chronic cardiopulmonary disease    4. Long term current use of anticoagulant therapy        Plan:     Lasix 80 mg IVP, then 20 mg/hr X 6 hours.  Followed by Dr. Head at [facility].  Continue biweekly outpatient infusions.

## 2017-11-13 NOTE — PLAN OF CARE
Problem: Cardiac: Heart Failure (Adult)  Goal: Signs and Symptoms of Listed Potential Problems Will be Absent, Minimized or Managed (Cardiac: Heart Failure)  Signs and symptoms of listed potential problems will be absent, minimized or managed by discharge/transition of care (reference Cardiac: Heart Failure (Adult) CPG).   Outcome: Ongoing (interventions implemented as appropriate)  Arrived from home in stable condition .Denies any acute distress at this time.Wt remain up from dry wt  from 152.50 to 153.10 kg this visit.Orders given per ALEXANDRE Adams NP and carried out.IV started and labs sent.Lasix 80 mg ivp followed by lasix drip @20 cc/hr x 6 hours.NP here,notified of am labs and no pm labs ordered.Pt stated he will eat potatoes for lunch to bring his K+ up. .Reinforced medication regimen.Tolerated infusion well.Urine output total 2445 ml with net -1417.Pt will miss next infusion because he will in [place] for the holidays.Will return back in Dec 5.Discharge home,vss,neuro intact.Denies any acute distress/concerns at this time.Left floor in stable condition.Return in 4 weeks.Huan

## 2017-11-22 DIAGNOSIS — I27.0 PRIMARY PULMONARY HYPERTENSION: Primary | ICD-10-CM

## 2017-12-01 DIAGNOSIS — I27.0 PRIMARY PULMONARY HYPERTENSION: Primary | ICD-10-CM

## 2017-12-08 DIAGNOSIS — I27.0 PRIMARY PULMONARY HYPERTENSION: Primary | ICD-10-CM

## 2017-12-11 ENCOUNTER — INFUSION (OUTPATIENT)
Dept: CARDIOLOGY | Facility: HOSPITAL | Age: 42
End: 2017-12-11
Attending: INTERNAL MEDICINE
Payer: MEDICARE

## 2017-12-11 VITALS
SYSTOLIC BLOOD PRESSURE: 108 MMHG | WEIGHT: 315 LBS | DIASTOLIC BLOOD PRESSURE: 56 MMHG | BODY MASS INDEX: 56.28 KG/M2 | HEART RATE: 89 BPM | OXYGEN SATURATION: 90 % | RESPIRATION RATE: 32 BRPM

## 2017-12-11 DIAGNOSIS — E66.2 PICKWICKIAN SYNDROME: ICD-10-CM

## 2017-12-11 DIAGNOSIS — I27.9 CHRONIC PULMONARY HEART DISEASE: Primary | ICD-10-CM

## 2017-12-11 DIAGNOSIS — Z79.01 LONG TERM CURRENT USE OF ANTICOAGULANT THERAPY: ICD-10-CM

## 2017-12-11 DIAGNOSIS — G47.33 OSA (OBSTRUCTIVE SLEEP APNEA): ICD-10-CM

## 2017-12-11 DIAGNOSIS — E66.01 MORBID OBESITY: ICD-10-CM

## 2017-12-11 DIAGNOSIS — I27.81 COR PULMONALE: ICD-10-CM

## 2017-12-11 DIAGNOSIS — I27.9 CHRONIC CARDIOPULMONARY DISEASE: ICD-10-CM

## 2017-12-11 DIAGNOSIS — I27.20 PULMONARY HYPERTENSION: ICD-10-CM

## 2017-12-11 LAB
ALBUMIN SERPL BCP-MCNC: 2.8 G/DL
ALP SERPL-CCNC: 117 U/L
ALT SERPL W/O P-5'-P-CCNC: 13 U/L
ANION GAP SERPL CALC-SCNC: 8 MMOL/L
AST SERPL-CCNC: 17 U/L
BILIRUB DIRECT SERPL-MCNC: 0.4 MG/DL
BILIRUB SERPL-MCNC: 0.8 MG/DL
BNP SERPL-MCNC: <10 PG/ML
BUN SERPL-MCNC: 15 MG/DL
CALCIUM SERPL-MCNC: 9 MG/DL
CHLORIDE SERPL-SCNC: 93 MMOL/L
CO2 SERPL-SCNC: 39 MMOL/L
CREAT SERPL-MCNC: 1.1 MG/DL
EST. GFR  (AFRICAN AMERICAN): >60 ML/MIN/1.73 M^2
EST. GFR  (NON AFRICAN AMERICAN): >60 ML/MIN/1.73 M^2
GLUCOSE SERPL-MCNC: 104 MG/DL
INR PPP: 1.9
MAGNESIUM SERPL-MCNC: 1.3 MG/DL
POTASSIUM SERPL-SCNC: 3.5 MMOL/L
PROT SERPL-MCNC: 8 G/DL
PROTHROMBIN TIME: 18.8 SEC
SODIUM SERPL-SCNC: 140 MMOL/L

## 2017-12-11 PROCEDURE — 99499 UNLISTED E&M SERVICE: CPT | Mod: ,,, | Performed by: NURSE PRACTITIONER

## 2017-12-11 PROCEDURE — 25000003 PHARM REV CODE 250: Performed by: NURSE PRACTITIONER

## 2017-12-11 PROCEDURE — 83880 ASSAY OF NATRIURETIC PEPTIDE: CPT

## 2017-12-11 PROCEDURE — 36415 COLL VENOUS BLD VENIPUNCTURE: CPT

## 2017-12-11 PROCEDURE — 96366 THER/PROPH/DIAG IV INF ADDON: CPT

## 2017-12-11 PROCEDURE — 96365 THER/PROPH/DIAG IV INF INIT: CPT

## 2017-12-11 PROCEDURE — 96376 TX/PRO/DX INJ SAME DRUG ADON: CPT

## 2017-12-11 PROCEDURE — 80048 BASIC METABOLIC PNL TOTAL CA: CPT

## 2017-12-11 PROCEDURE — 85610 PROTHROMBIN TIME: CPT

## 2017-12-11 PROCEDURE — 25000003 PHARM REV CODE 250: Performed by: PHYSICIAN ASSISTANT

## 2017-12-11 PROCEDURE — 83735 ASSAY OF MAGNESIUM: CPT

## 2017-12-11 PROCEDURE — 99213 OFFICE O/P EST LOW 20 MIN: CPT | Mod: ,,, | Performed by: INTERNAL MEDICINE

## 2017-12-11 PROCEDURE — 80076 HEPATIC FUNCTION PANEL: CPT

## 2017-12-11 PROCEDURE — 63600175 PHARM REV CODE 636 W HCPCS: Performed by: PHYSICIAN ASSISTANT

## 2017-12-11 PROCEDURE — 63600175 PHARM REV CODE 636 W HCPCS: Performed by: NURSE PRACTITIONER

## 2017-12-11 RX ORDER — LANOLIN ALCOHOL/MO/W.PET/CERES
400 CREAM (GRAM) TOPICAL 2 TIMES DAILY
Qty: 60 TABLET | Refills: 11 | COMMUNITY
Start: 2017-12-11 | End: 2021-03-07 | Stop reason: SDUPTHER

## 2017-12-11 RX ORDER — POTASSIUM CHLORIDE 20 MEQ/1
20 TABLET, EXTENDED RELEASE ORAL 2 TIMES DAILY
Status: DISCONTINUED | OUTPATIENT
Start: 2017-12-11 | End: 2018-01-22

## 2017-12-11 RX ORDER — WARFARIN 10 MG/1
TABLET ORAL
Qty: 45 TABLET | Refills: 11 | Status: SHIPPED | OUTPATIENT
Start: 2017-12-11 | End: 2019-06-07 | Stop reason: SDUPTHER

## 2017-12-11 RX ORDER — POTASSIUM CHLORIDE 750 MG/1
40 TABLET, EXTENDED RELEASE ORAL ONCE
Status: DISCONTINUED | OUTPATIENT
Start: 2017-12-11 | End: 2018-01-22 | Stop reason: ALTCHOICE

## 2017-12-11 RX ORDER — LANOLIN ALCOHOL/MO/W.PET/CERES
400 CREAM (GRAM) TOPICAL ONCE
Status: COMPLETED | OUTPATIENT
Start: 2017-12-11 | End: 2017-12-11

## 2017-12-11 RX ORDER — FUROSEMIDE 10 MG/ML
80 INJECTION INTRAMUSCULAR; INTRAVENOUS
Status: COMPLETED | OUTPATIENT
Start: 2017-12-11 | End: 2017-12-11

## 2017-12-11 RX ADMIN — FUROSEMIDE 80 MG: 10 INJECTION, SOLUTION INTRAMUSCULAR; INTRAVENOUS at 09:12

## 2017-12-11 RX ADMIN — Medication 400 MG: at 11:12

## 2017-12-11 RX ADMIN — FUROSEMIDE 20 MG/HR: 10 INJECTION, SOLUTION INTRAVENOUS at 09:12

## 2017-12-11 NOTE — PLAN OF CARE
Problem: Patient Care Overview  Goal: Plan of Care Review  Outcome: Ongoing (interventions implemented as appropriate)  Arrived from home in stable condition .Denies any acute distress at this time.Wt remain same kg from 153.10 to 152.40 kg this visit.Orders given per GRIFFIN Rooney NP and carried out.IV started and labs sent.Lasix 80 mg ivp followed by lasix drip @20 cc/hr x 6 hours.Reported  Dr Gilmore  will order a stress test soon but not sure what kind . Will continue to monitor

## 2017-12-11 NOTE — PLAN OF CARE
Problem: Cardiac: Heart Failure (Adult)  Goal: Signs and Symptoms of Listed Potential Problems Will be Absent, Minimized or Managed (Cardiac: Heart Failure)  Signs and symptoms of listed potential problems will be absent, minimized or managed by discharge/transition of care (reference Cardiac: Heart Failure (Adult) CPG).   Outcome: Ongoing (interventions implemented as appropriate)  GRIFFIN Rooney NP here,notified of am labs supplemented with Kcl 40 and mag ox 800 mg.No pm labs ordered.Reinforced change in medication regimen,Potassuim 20 mEq po bid and take mag ox 400 mg bid. Verbalized understanding .Tolerated infusion well.Urine output total 1975 ml with net - 1317 ml.Discharge home,vss,neuro intact.Denies any acute distress/concerns at this time.Left floor ambulating in stable condition.Return on Dec 20 because pt will be out of town for [place] Holidays.Huan

## 2017-12-19 DIAGNOSIS — I27.0 PRIMARY PULMONARY HYPERTENSION: Primary | ICD-10-CM

## 2017-12-20 ENCOUNTER — INFUSION (OUTPATIENT)
Dept: CARDIOLOGY | Facility: HOSPITAL | Age: 42
End: 2017-12-20
Attending: INTERNAL MEDICINE
Payer: MEDICARE

## 2017-12-20 VITALS
BODY MASS INDEX: 56.66 KG/M2 | TEMPERATURE: 99 F | OXYGEN SATURATION: 93 % | WEIGHT: 315 LBS | RESPIRATION RATE: 16 BRPM | DIASTOLIC BLOOD PRESSURE: 77 MMHG | HEART RATE: 90 BPM | SYSTOLIC BLOOD PRESSURE: 121 MMHG

## 2017-12-20 DIAGNOSIS — Z79.01 LONG TERM CURRENT USE OF ANTICOAGULANT THERAPY: ICD-10-CM

## 2017-12-20 DIAGNOSIS — I27.9 CHRONIC PULMONARY HEART DISEASE: Primary | ICD-10-CM

## 2017-12-20 DIAGNOSIS — I27.9 CHRONIC CARDIOPULMONARY DISEASE: ICD-10-CM

## 2017-12-20 LAB
ANION GAP SERPL CALC-SCNC: 9 MMOL/L
BNP SERPL-MCNC: <10 PG/ML
BUN SERPL-MCNC: 16 MG/DL
CALCIUM SERPL-MCNC: 9 MG/DL
CHLORIDE SERPL-SCNC: 96 MMOL/L
CO2 SERPL-SCNC: 35 MMOL/L
CREAT SERPL-MCNC: 1.1 MG/DL
EST. GFR  (AFRICAN AMERICAN): >60 ML/MIN/1.73 M^2
EST. GFR  (NON AFRICAN AMERICAN): >60 ML/MIN/1.73 M^2
GLUCOSE SERPL-MCNC: 94 MG/DL
INR PPP: 1.9
MAGNESIUM SERPL-MCNC: 1.6 MG/DL
POTASSIUM SERPL-SCNC: 4 MMOL/L
PROTHROMBIN TIME: 19.5 SEC
SODIUM SERPL-SCNC: 140 MMOL/L

## 2017-12-20 PROCEDURE — 63600175 PHARM REV CODE 636 W HCPCS: Performed by: PHYSICIAN ASSISTANT

## 2017-12-20 PROCEDURE — 99214 OFFICE O/P EST MOD 30 MIN: CPT | Mod: ,,, | Performed by: INTERNAL MEDICINE

## 2017-12-20 PROCEDURE — 25000003 PHARM REV CODE 250: Performed by: PHYSICIAN ASSISTANT

## 2017-12-20 PROCEDURE — 96365 THER/PROPH/DIAG IV INF INIT: CPT

## 2017-12-20 PROCEDURE — 63600175 PHARM REV CODE 636 W HCPCS: Performed by: NURSE PRACTITIONER

## 2017-12-20 PROCEDURE — 25000003 PHARM REV CODE 250: Performed by: NURSE PRACTITIONER

## 2017-12-20 PROCEDURE — 36415 COLL VENOUS BLD VENIPUNCTURE: CPT

## 2017-12-20 PROCEDURE — 80048 BASIC METABOLIC PNL TOTAL CA: CPT

## 2017-12-20 PROCEDURE — 83880 ASSAY OF NATRIURETIC PEPTIDE: CPT

## 2017-12-20 PROCEDURE — 99499 UNLISTED E&M SERVICE: CPT | Mod: ,,, | Performed by: PHYSICIAN ASSISTANT

## 2017-12-20 PROCEDURE — 85610 PROTHROMBIN TIME: CPT

## 2017-12-20 PROCEDURE — 96376 TX/PRO/DX INJ SAME DRUG ADON: CPT

## 2017-12-20 PROCEDURE — 83735 ASSAY OF MAGNESIUM: CPT

## 2017-12-20 PROCEDURE — 96366 THER/PROPH/DIAG IV INF ADDON: CPT

## 2017-12-20 RX ORDER — PNEUMOCOCCAL VACCINE POLYVALENT 25; 25; 25; 25; 25; 25; 25; 25; 25; 25; 25; 25; 25; 25; 25; 25; 25; 25; 25; 25; 25; 25; 25 UG/.5ML; UG/.5ML; UG/.5ML; UG/.5ML; UG/.5ML; UG/.5ML; UG/.5ML; UG/.5ML; UG/.5ML; UG/.5ML; UG/.5ML; UG/.5ML; UG/.5ML; UG/.5ML; UG/.5ML; UG/.5ML; UG/.5ML; UG/.5ML; UG/.5ML; UG/.5ML; UG/.5ML; UG/.5ML; UG/.5ML
INJECTION, SOLUTION INTRAMUSCULAR; SUBCUTANEOUS
Refills: 0 | Status: ON HOLD | COMMUNITY
Start: 2017-10-23 | End: 2019-12-09 | Stop reason: HOSPADM

## 2017-12-20 RX ORDER — LANOLIN ALCOHOL/MO/W.PET/CERES
800 CREAM (GRAM) TOPICAL ONCE
Status: COMPLETED | OUTPATIENT
Start: 2017-12-20 | End: 2017-12-20

## 2017-12-20 RX ORDER — WARFARIN SODIUM 5 MG/1
TABLET ORAL
Refills: 0 | COMMUNITY
Start: 2017-12-13 | End: 2018-10-29 | Stop reason: SDUPTHER

## 2017-12-20 RX ORDER — FUROSEMIDE 10 MG/ML
80 INJECTION INTRAMUSCULAR; INTRAVENOUS
Status: COMPLETED | OUTPATIENT
Start: 2017-12-20 | End: 2017-12-20

## 2017-12-20 RX ADMIN — Medication 800 MG: at 12:12

## 2017-12-20 RX ADMIN — FUROSEMIDE 20 MG/HR: 10 INJECTION, SOLUTION INTRAVENOUS at 08:12

## 2017-12-20 RX ADMIN — FUROSEMIDE 80 MG: 10 INJECTION, SOLUTION INTRAMUSCULAR; INTRAVENOUS at 08:12

## 2017-12-20 NOTE — PLAN OF CARE
Problem: Cardiac: Heart Failure (Adult)  Goal: Signs and Symptoms of Listed Potential Problems Will be Absent, Minimized or Managed (Cardiac: Heart Failure)  Signs and symptoms of listed potential problems will be absent, minimized or managed by discharge/transition of care (reference Cardiac: Heart Failure (Adult) CPG).   Outcome: Ongoing (interventions implemented as appropriate)  Arrived from home in stable condition .Denies any acute distress at this time.Wt up 1.05kg from 153.40 to 154.45 kg this visit.Orders given per D Rolling PA and carried out.IV started and labs sent.Lasix 80 mg ivp followed by lasix drip @20 cc/hr x 6 hours.PA here,notified of am labs and no pm labs ordered.Supplemented with Mag ox 800 mg x 1 dose..Pt reported falling over the Thanksgiving holiday,hitting his lt leg at that time.No bruising noted today..Reinforced medication regimen,low sodium diet and 1500 cc fluid restriction.Tolerated infusion well.Urine output total 2800 ml with net -1947 ml.Discharge home,vss,neuro intact.Denies any acute distress/concerns at this time.left floor in stable condition.Return n 1 week.Huan

## 2017-12-22 NOTE — PROGRESS NOTES
Subjective:    Patient ID:  Yong Mcintyre is a 42 y.o. male who presents for follow-up of PH    HPI  43 y/o AAM presents for biweekly outpatient IV diuretic tx. He remains on Lasix 80 mg bid, Tracleer and Revatio and is on home O2 at 3 LPM. Has gained ~ 80# over the last year 2/2 dietary indiscretion and not going to the gym. Sees Dr. Head at [facility] once a year. No new complaints today.    Review of Systems   Constitution: Positive for weight gain.   Cardiovascular: Positive for dyspnea on exertion. Negative for leg swelling, orthopnea and paroxysmal nocturnal dyspnea.   Respiratory: Positive for shortness of breath.    Gastrointestinal: Negative for nausea and vomiting.   Neurological: Negative for dizziness and light-headedness.        Objective:    Physical Exam   Constitutional: He is oriented to person, place, and time. He appears well-developed and well-nourished.   /77 (BP Location: Right arm, Patient Position: Sitting)   Pulse 90   Temp 98.6 °F (37 °C) (Oral)   Resp 16   Wt (!) 154.5 kg (340 lb 8 oz)   SpO2 (!) 93%   BMI 56.66 kg/m²      Neck: No JVD present.   Cardiovascular: Normal rate, regular rhythm and normal heart sounds.    Pulses:       Radial pulses are 2+ on the right side, and 2+ on the left side.   Pulmonary/Chest: Effort normal and breath sounds normal.   Abdominal: Soft. Bowel sounds are normal. There is no tenderness.   Musculoskeletal: He exhibits no edema.   Neurological: He is alert and oriented to person, place, and time.   Skin: Skin is warm and dry.         Assessment:       1. Chronic pulmonary heart disease    2. Chronic cardiopulmonary disease    3. Long term current use of anticoagulant therapy         Plan:       Lasix 80 mg IVP, then 20 mg/hr X 6 hours.  Followed by Dr. Head at [facility]  Continue biweekly outpatient infusions.

## 2018-01-03 DIAGNOSIS — I27.20 PHT (PULMONARY HYPERTENSION): ICD-10-CM

## 2018-01-03 DIAGNOSIS — I27.0 PRIMARY PULMONARY HYPERTENSION: Primary | ICD-10-CM

## 2018-01-08 ENCOUNTER — INFUSION (OUTPATIENT)
Dept: CARDIOLOGY | Facility: HOSPITAL | Age: 43
End: 2018-01-08
Attending: INTERNAL MEDICINE
Payer: MEDICARE

## 2018-01-08 VITALS
SYSTOLIC BLOOD PRESSURE: 124 MMHG | RESPIRATION RATE: 16 BRPM | WEIGHT: 315 LBS | DIASTOLIC BLOOD PRESSURE: 72 MMHG | OXYGEN SATURATION: 94 % | BODY MASS INDEX: 56.39 KG/M2 | TEMPERATURE: 98 F | HEART RATE: 95 BPM

## 2018-01-08 DIAGNOSIS — I27.9 CHRONIC PULMONARY HEART DISEASE: Primary | ICD-10-CM

## 2018-01-08 DIAGNOSIS — I27.9 CHRONIC CARDIOPULMONARY DISEASE: ICD-10-CM

## 2018-01-08 DIAGNOSIS — Z79.01 LONG TERM CURRENT USE OF ANTICOAGULANT THERAPY: ICD-10-CM

## 2018-01-08 LAB
ALBUMIN SERPL BCP-MCNC: 2.7 G/DL
ALP SERPL-CCNC: 109 U/L
ALT SERPL W/O P-5'-P-CCNC: 12 U/L
ANION GAP SERPL CALC-SCNC: 6 MMOL/L
AST SERPL-CCNC: 17 U/L
BILIRUB DIRECT SERPL-MCNC: 0.2 MG/DL
BILIRUB SERPL-MCNC: 0.4 MG/DL
BNP SERPL-MCNC: 12 PG/ML
BUN SERPL-MCNC: 15 MG/DL
CALCIUM SERPL-MCNC: 8.5 MG/DL
CHLORIDE SERPL-SCNC: 98 MMOL/L
CO2 SERPL-SCNC: 35 MMOL/L
CREAT SERPL-MCNC: 0.9 MG/DL
EST. GFR  (AFRICAN AMERICAN): >60 ML/MIN/1.73 M^2
EST. GFR  (NON AFRICAN AMERICAN): >60 ML/MIN/1.73 M^2
GLUCOSE SERPL-MCNC: 91 MG/DL
INR PPP: 2.1
MAGNESIUM SERPL-MCNC: 1.6 MG/DL
POTASSIUM SERPL-SCNC: 3.6 MMOL/L
PROT SERPL-MCNC: 7.7 G/DL
PROTHROMBIN TIME: 21 SEC
SODIUM SERPL-SCNC: 139 MMOL/L

## 2018-01-08 PROCEDURE — 96366 THER/PROPH/DIAG IV INF ADDON: CPT

## 2018-01-08 PROCEDURE — 63600175 PHARM REV CODE 636 W HCPCS: Performed by: PHYSICIAN ASSISTANT

## 2018-01-08 PROCEDURE — 83735 ASSAY OF MAGNESIUM: CPT

## 2018-01-08 PROCEDURE — 80048 BASIC METABOLIC PNL TOTAL CA: CPT

## 2018-01-08 PROCEDURE — 63600175 PHARM REV CODE 636 W HCPCS: Performed by: NURSE PRACTITIONER

## 2018-01-08 PROCEDURE — 80076 HEPATIC FUNCTION PANEL: CPT

## 2018-01-08 PROCEDURE — 25000003 PHARM REV CODE 250: Performed by: PHYSICIAN ASSISTANT

## 2018-01-08 PROCEDURE — 96365 THER/PROPH/DIAG IV INF INIT: CPT

## 2018-01-08 PROCEDURE — 99213 OFFICE O/P EST LOW 20 MIN: CPT | Mod: ,,, | Performed by: INTERNAL MEDICINE

## 2018-01-08 PROCEDURE — 85610 PROTHROMBIN TIME: CPT

## 2018-01-08 PROCEDURE — 83880 ASSAY OF NATRIURETIC PEPTIDE: CPT

## 2018-01-08 PROCEDURE — 36415 COLL VENOUS BLD VENIPUNCTURE: CPT

## 2018-01-08 PROCEDURE — 99499 UNLISTED E&M SERVICE: CPT | Mod: ,,, | Performed by: PHYSICIAN ASSISTANT

## 2018-01-08 PROCEDURE — 96376 TX/PRO/DX INJ SAME DRUG ADON: CPT

## 2018-01-08 RX ORDER — FUROSEMIDE 10 MG/ML
80 INJECTION INTRAMUSCULAR; INTRAVENOUS
Status: COMPLETED | OUTPATIENT
Start: 2018-01-08 | End: 2018-01-08

## 2018-01-08 RX ORDER — POTASSIUM CHLORIDE 750 MG/1
40 TABLET, EXTENDED RELEASE ORAL ONCE
Status: COMPLETED | OUTPATIENT
Start: 2018-01-08 | End: 2018-01-08

## 2018-01-08 RX ORDER — LANOLIN ALCOHOL/MO/W.PET/CERES
800 CREAM (GRAM) TOPICAL ONCE
Status: COMPLETED | OUTPATIENT
Start: 2018-01-08 | End: 2018-01-08

## 2018-01-08 RX ADMIN — POTASSIUM CHLORIDE 40 MEQ: 10 TABLET, EXTENDED RELEASE ORAL at 10:01

## 2018-01-08 RX ADMIN — Medication 800 MG: at 10:01

## 2018-01-08 RX ADMIN — FUROSEMIDE 80 MG: 10 INJECTION, SOLUTION INTRAMUSCULAR; INTRAVENOUS at 08:01

## 2018-01-08 RX ADMIN — FUROSEMIDE 20 MG/HR: 10 INJECTION, SOLUTION INTRAVENOUS at 08:01

## 2018-01-08 NOTE — PROGRESS NOTES
Subjective:    Patient ID:  Yong Mcintyre is a 42 y.o. male who presents for follow-up of PH    HPI  41 y/o AAM presents for biweekly outpatient IV diuretic tx. He remains on Lasix 80 mg bid, Tracleer and Revatio and is on home O2 at 3 LPM. Has gained ~ 80# over the last year 2/2 dietary indiscretion and not going to the gym. Sees Dr. Head at [facility] once a year. No new complaints today. Weight stable.     Review of Systems   Constitution: Positive for weight gain.   Cardiovascular: Positive for dyspnea on exertion. Negative for leg swelling, orthopnea and paroxysmal nocturnal dyspnea.   Respiratory: Positive for shortness of breath.    Gastrointestinal: Negative for nausea and vomiting.   Neurological: Negative for dizziness and light-headedness.        Objective:    Physical Exam   Constitutional: He is oriented to person, place, and time. He appears well-developed and well-nourished.   /77 (BP Location: Right arm, Patient Position: Sitting)   Pulse 90   Temp 98.6 °F (37 °C) (Oral)   Resp 16   Wt (!) 154.5 kg (340 lb 8 oz)   SpO2 (!) 93%   BMI 56.66 kg/m²      Neck: No JVD present.   Cardiovascular: Normal rate, regular rhythm and normal heart sounds.    Pulses:       Radial pulses are 2+ on the right side, and 2+ on the left side.   Pulmonary/Chest: Effort normal and breath sounds normal.   Abdominal: Soft. Bowel sounds are normal. There is no tenderness.   Musculoskeletal: He exhibits no edema.   Neurological: He is alert and oriented to person, place, and time.   Skin: Skin is warm and dry.         Assessment:       1. Chronic pulmonary heart disease    2. Chronic cardiopulmonary disease    3. Long term current use of anticoagulant therapy         Plan:     Lasix 80 mg IVP, then 20 mg/hr X 6 hours.  Followed by Dr. Head at [facility]  Continue biweekly outpatient infusions.

## 2018-01-08 NOTE — PLAN OF CARE
Problem: Cardiac: Heart Failure (Adult)  Goal: Signs and Symptoms of Listed Potential Problems Will be Absent, Minimized or Managed (Cardiac: Heart Failure)  Signs and symptoms of listed potential problems will be absent, minimized or managed by discharge/transition of care (reference Cardiac: Heart Failure (Adult) CPG).   Outcome: Ongoing (interventions implemented as appropriate)  ALEXANDRE BROWN here,notified of am labs K+3.6,mag 1.6.Supplemented with Kcl 40 mEq po and Mag ox 800 mg X 1 dose .No pm labs ordered per PA.Reinforced medication regimen.Tolerated infusion well.Urine output 3925 ml total with net -3142 ml.Discharge home,vss,neuro intact.Denies any acute distress/concerns at this time.Left floor in stable condition.Return in 2 weeks.Huan

## 2018-01-08 NOTE — PLAN OF CARE
Problem: Patient Care Overview  Goal: Plan of Care Review  Outcome: Ongoing (interventions implemented as appropriate)  Arrived from home ambulating in stable condition.Denies any acute distress at this time.Missed last visit because he was in [place].Reported he will be running out of his Tracleer by February.Tiffanie Parr  that pt jovon has ran out and unable to pay for his Tracleer.SW will contact pt via phone about his medication.Wt remain stable from 154.7 to 153.7 kg this visit.Orders given per ALEXANDRE Adams NP and carried out.IV started and labs sent.Lasix 80 mg ivp followedby lasix drip @20 cc/hr x 6 hours.Will continue to monitor

## 2018-01-19 DIAGNOSIS — I27.0 PRIMARY PULMONARY HTN: Primary | ICD-10-CM

## 2018-01-22 ENCOUNTER — INFUSION (OUTPATIENT)
Dept: CARDIOLOGY | Facility: HOSPITAL | Age: 43
End: 2018-01-22
Attending: INTERNAL MEDICINE
Payer: MEDICARE

## 2018-01-22 ENCOUNTER — ANTI-COAG VISIT (OUTPATIENT)
Dept: CARDIOLOGY | Facility: CLINIC | Age: 43
End: 2018-01-22

## 2018-01-22 VITALS
OXYGEN SATURATION: 94 % | BODY MASS INDEX: 56.39 KG/M2 | SYSTOLIC BLOOD PRESSURE: 115 MMHG | WEIGHT: 315 LBS | TEMPERATURE: 99 F | HEART RATE: 97 BPM | RESPIRATION RATE: 18 BRPM | DIASTOLIC BLOOD PRESSURE: 62 MMHG

## 2018-01-22 DIAGNOSIS — G47.33 OSA (OBSTRUCTIVE SLEEP APNEA): ICD-10-CM

## 2018-01-22 DIAGNOSIS — I27.9 CHRONIC CARDIOPULMONARY DISEASE: ICD-10-CM

## 2018-01-22 DIAGNOSIS — Z79.01 LONG TERM CURRENT USE OF ANTICOAGULANT THERAPY: ICD-10-CM

## 2018-01-22 DIAGNOSIS — I27.9 CHRONIC PULMONARY HEART DISEASE: ICD-10-CM

## 2018-01-22 DIAGNOSIS — I27.81 COR PULMONALE: ICD-10-CM

## 2018-01-22 DIAGNOSIS — E66.2 PICKWICKIAN SYNDROME: ICD-10-CM

## 2018-01-22 DIAGNOSIS — I27.0 PRIMARY PULMONARY HYPERTENSION: Primary | ICD-10-CM

## 2018-01-22 DIAGNOSIS — E66.01 MORBID OBESITY: ICD-10-CM

## 2018-01-22 LAB
ANION GAP SERPL CALC-SCNC: 8 MMOL/L
BNP SERPL-MCNC: <10 PG/ML
BUN SERPL-MCNC: 15 MG/DL
CALCIUM SERPL-MCNC: 8.8 MG/DL
CHLORIDE SERPL-SCNC: 98 MMOL/L
CO2 SERPL-SCNC: 35 MMOL/L
CREAT SERPL-MCNC: 1 MG/DL
EST. GFR  (AFRICAN AMERICAN): >60 ML/MIN/1.73 M^2
EST. GFR  (NON AFRICAN AMERICAN): >60 ML/MIN/1.73 M^2
GLUCOSE SERPL-MCNC: 97 MG/DL
INR PPP: 5
MAGNESIUM SERPL-MCNC: 1.5 MG/DL
POTASSIUM SERPL-SCNC: 3.5 MMOL/L
PROTHROMBIN TIME: 48.7 SEC
SODIUM SERPL-SCNC: 141 MMOL/L

## 2018-01-22 PROCEDURE — 63600175 PHARM REV CODE 636 W HCPCS: Performed by: PHYSICIAN ASSISTANT

## 2018-01-22 PROCEDURE — 80048 BASIC METABOLIC PNL TOTAL CA: CPT

## 2018-01-22 PROCEDURE — 99499 UNLISTED E&M SERVICE: CPT | Mod: ,,, | Performed by: PHYSICIAN ASSISTANT

## 2018-01-22 PROCEDURE — 36415 COLL VENOUS BLD VENIPUNCTURE: CPT

## 2018-01-22 PROCEDURE — 83735 ASSAY OF MAGNESIUM: CPT

## 2018-01-22 PROCEDURE — 96376 TX/PRO/DX INJ SAME DRUG ADON: CPT

## 2018-01-22 PROCEDURE — 25000003 PHARM REV CODE 250: Performed by: PHYSICIAN ASSISTANT

## 2018-01-22 PROCEDURE — 99213 OFFICE O/P EST LOW 20 MIN: CPT | Mod: ,,, | Performed by: INTERNAL MEDICINE

## 2018-01-22 PROCEDURE — 85610 PROTHROMBIN TIME: CPT

## 2018-01-22 PROCEDURE — 63600175 PHARM REV CODE 636 W HCPCS: Performed by: NURSE PRACTITIONER

## 2018-01-22 PROCEDURE — 96365 THER/PROPH/DIAG IV INF INIT: CPT

## 2018-01-22 PROCEDURE — 83880 ASSAY OF NATRIURETIC PEPTIDE: CPT

## 2018-01-22 PROCEDURE — 96366 THER/PROPH/DIAG IV INF ADDON: CPT

## 2018-01-22 RX ORDER — FUROSEMIDE 10 MG/ML
80 INJECTION INTRAMUSCULAR; INTRAVENOUS
Status: COMPLETED | OUTPATIENT
Start: 2018-01-22 | End: 2018-01-22

## 2018-01-22 RX ORDER — LANOLIN ALCOHOL/MO/W.PET/CERES
400 CREAM (GRAM) TOPICAL ONCE
Status: COMPLETED | OUTPATIENT
Start: 2018-01-22 | End: 2018-01-22

## 2018-01-22 RX ORDER — POTASSIUM CHLORIDE 750 MG/1
20 TABLET, EXTENDED RELEASE ORAL 2 TIMES DAILY
Qty: 60 TABLET | Refills: 1 | Status: SHIPPED | OUTPATIENT
Start: 2018-01-22 | End: 2018-07-05

## 2018-01-22 RX ORDER — POTASSIUM CHLORIDE 750 MG/1
40 TABLET, EXTENDED RELEASE ORAL ONCE
Status: DISCONTINUED | OUTPATIENT
Start: 2018-01-22 | End: 2018-01-22 | Stop reason: ALTCHOICE

## 2018-01-22 RX ADMIN — Medication 400 MG: at 12:01

## 2018-01-22 RX ADMIN — POTASSIUM CHLORIDE 40 MEQ: 10 TABLET, EXTENDED RELEASE ORAL at 12:01

## 2018-01-22 RX ADMIN — FUROSEMIDE 20 MG/HR: 10 INJECTION, SOLUTION INTRAVENOUS at 08:01

## 2018-01-22 RX ADMIN — FUROSEMIDE 80 MG: 10 INJECTION, SOLUTION INTRAMUSCULAR; INTRAVENOUS at 08:01

## 2018-01-22 NOTE — PROGRESS NOTES
Questioned and confirmed correct dose .  Patient states he has been having his INR drawn every other week.  Reports Mild nose bleed (Bright Red Blood) 1/18 .  Alcohol  intake : One shot of Vanilla Corbin one week ago patient states.  Over the counter medications Nasal tiana and zyrtec .  Denies any bruising, new medications ,or other changes.

## 2018-01-22 NOTE — PLAN OF CARE
Problem: Cardiac: Heart Failure (Adult)  Goal: Signs and Symptoms of Listed Potential Problems Will be Absent, Minimized or Managed (Cardiac: Heart Failure)  Signs and symptoms of listed potential problems will be absent, minimized or managed by discharge/transition of care (reference Cardiac: Heart Failure (Adult) CPG).   Outcome: Ongoing (interventions implemented as appropriate)  ALEXANDRE BROWN here,notified of am labs and no pm labs ordered.Supplemented with Mag ox 400 mg and Kcl 40 mEq po x 1 dose.Reinforced medication regimen and to pick Kcl form pharmacy today.Coumadin MA called,spoke with pt  via phone and instructed him to hold coumadin x 2 days and resume on Wednesday.Labs scheduled for Monday 1/29 in coumadin clinic.Tolerated infusion well.Urine output total 2950 ml with net -2160 ml.Discharge home,vss,neuro intact.Denies any acute distress/concerns at this time.Left floor ambulating in stable condition.Return in 2 weeks.Huan

## 2018-01-22 NOTE — PROGRESS NOTES
Subjective:    Patient ID:  Yong Mcintyre is a 42 y.o. male who presents for follow-up of PH    HPI  43 y/o AAM presents for biweekly outpatient IV diuretic tx. He remains on Lasix 80 mg bid, Tracleer and Revatio and is on home O2 at 3 LPM. Has gained ~ 80# over the last year 2/2 dietary indiscretion and not going to the gym. Sees Dr. Head at [facility] once a year.    He reports he recently had a head cold with sinus pressure and minor fever, but now feels great.  He has no new complaints this morning.  His weight is stable and about the same as last visit    Review of Systems   Constitution: Positive for weight gain.   Cardiovascular: Positive for dyspnea on exertion. Negative for leg swelling, orthopnea and paroxysmal nocturnal dyspnea.   Respiratory: Positive for shortness of breath.    Gastrointestinal: Negative for nausea and vomiting.   Neurological: Negative for dizziness and light-headedness.        Objective:    Physical Exam   Constitutional: He is oriented to person, place, and time. He appears well-developed and well-nourished.   /77 (BP Location: Right arm, Patient Position: Sitting)   Pulse 90   Temp 98.6 °F (37 °C) (Oral)   Resp 16   Wt (!) 154.5 kg (340 lb 8 oz)   SpO2 (!) 93%   BMI 56.66 kg/m²      Neck: No JVD present.   Cardiovascular: Normal rate, regular rhythm and normal heart sounds.    Pulses:       Radial pulses are 2+ on the right side, and 2+ on the left side.   Pulmonary/Chest: Effort normal and breath sounds normal.   Abdominal: Soft. Bowel sounds are normal. There is no tenderness.   Musculoskeletal: He exhibits no edema.   Neurological: He is alert and oriented to person, place, and time.   Skin: Skin is warm and dry.         Assessment:       1. Primary pulmonary hypertension    2. Chronic cardiopulmonary disease    3. Chronic pulmonary heart disease    4. Long term current use of anticoagulant therapy         Plan:     Lasix 80 mg IVP, then 20 mg/hr X 6 hours.  His  INR is elevated at 5.0; instructed patient to hold Coumadin today and eat a salad.  Coumadin clinic will call him with further instructions  K+ was low and has been at last few visits.  Will give patient an RX of potassium replacement   Followed by Dr. Head at [facility]  Continue biweekly outpatient infusions.

## 2018-01-22 NOTE — PLAN OF CARE
Problem: Patient Care Overview  Goal: Plan of Care Review  Outcome: Ongoing (interventions implemented as appropriate)  Arrived from home in stable condition.Denies any acute distress at this time.Reported not feeling well last week with aches,low grade fever and post nasal drip x 4 days.Wt remain unchange at 153.7 kg .Orders given per S Davin PA and carried out.IV started and labs sent.Lasix 80 mg ivp followed by lasix drip @20 cc/hr x 6 hours.Recieved call form [facility]bel in core lab with critical lab pt 48.7 and inr 5.0.Pt reported nosebleed last week x 1.Notified PA and [facility] in coumadin clinic.Charisse will call patient with orders.Pa instructed pt to eat a salad or green veg for lunch.Will continue to monitor

## 2018-01-22 NOTE — PROGRESS NOTES
Verbal result taken from ____Renee_____. PT/INR __48.7 / 5.0_____ Date drawn____1/22/2018____ Hardcopy to be faxed.    It appears that the pt was last due for an INR with us on 11/27/17, which was missed.

## 2018-01-30 ENCOUNTER — LAB VISIT (OUTPATIENT)
Dept: LAB | Facility: HOSPITAL | Age: 43
End: 2018-01-30
Attending: INTERNAL MEDICINE
Payer: MEDICARE

## 2018-01-30 DIAGNOSIS — I27.9 CHRONIC PULMONARY HEART DISEASE: ICD-10-CM

## 2018-01-30 DIAGNOSIS — Z79.01 LONG TERM CURRENT USE OF ANTICOAGULANT THERAPY: ICD-10-CM

## 2018-01-30 LAB
INR PPP: 1.2
PROTHROMBIN TIME: 12.6 SEC

## 2018-01-30 PROCEDURE — 36415 COLL VENOUS BLD VENIPUNCTURE: CPT

## 2018-01-30 PROCEDURE — 85610 PROTHROMBIN TIME: CPT

## 2018-01-31 ENCOUNTER — ANTI-COAG VISIT (OUTPATIENT)
Dept: CARDIOLOGY | Facility: CLINIC | Age: 43
End: 2018-01-31

## 2018-01-31 DIAGNOSIS — Z79.01 LONG TERM CURRENT USE OF ANTICOAGULANT THERAPY: ICD-10-CM

## 2018-02-02 DIAGNOSIS — I27.0 PRIMARY PULMONARY HYPERTENSION: Primary | ICD-10-CM

## 2018-02-16 DIAGNOSIS — I27.0 PRIMARY PULMONARY HYPERTENSION: Primary | ICD-10-CM

## 2018-02-19 ENCOUNTER — ANTI-COAG VISIT (OUTPATIENT)
Dept: CARDIOLOGY | Facility: CLINIC | Age: 43
End: 2018-02-19

## 2018-02-19 ENCOUNTER — INFUSION (OUTPATIENT)
Dept: CARDIOLOGY | Facility: HOSPITAL | Age: 43
End: 2018-02-19
Attending: INTERNAL MEDICINE
Payer: MEDICARE

## 2018-02-19 VITALS
SYSTOLIC BLOOD PRESSURE: 117 MMHG | HEART RATE: 100 BPM | WEIGHT: 315 LBS | DIASTOLIC BLOOD PRESSURE: 70 MMHG | BODY MASS INDEX: 55.43 KG/M2 | OXYGEN SATURATION: 97 % | TEMPERATURE: 99 F | RESPIRATION RATE: 19 BRPM

## 2018-02-19 DIAGNOSIS — Z79.01 LONG TERM CURRENT USE OF ANTICOAGULANT THERAPY: ICD-10-CM

## 2018-02-19 DIAGNOSIS — I27.9 CHRONIC CARDIOPULMONARY DISEASE: ICD-10-CM

## 2018-02-19 DIAGNOSIS — I27.9 CHRONIC PULMONARY HEART DISEASE: Primary | ICD-10-CM

## 2018-02-19 LAB
ALBUMIN SERPL BCP-MCNC: 3 G/DL
ALP SERPL-CCNC: 129 U/L
ALT SERPL W/O P-5'-P-CCNC: 20 U/L
ANION GAP SERPL CALC-SCNC: 8 MMOL/L
AST SERPL-CCNC: 21 U/L
BILIRUB DIRECT SERPL-MCNC: 0.3 MG/DL
BILIRUB SERPL-MCNC: 0.7 MG/DL
BNP SERPL-MCNC: 14 PG/ML
BUN SERPL-MCNC: 18 MG/DL
CALCIUM SERPL-MCNC: 9.4 MG/DL
CHLORIDE SERPL-SCNC: 95 MMOL/L
CO2 SERPL-SCNC: 35 MMOL/L
CREAT SERPL-MCNC: 1.2 MG/DL
EST. GFR  (AFRICAN AMERICAN): >60 ML/MIN/1.73 M^2
EST. GFR  (NON AFRICAN AMERICAN): >60 ML/MIN/1.73 M^2
GLUCOSE SERPL-MCNC: 98 MG/DL
INR PPP: 2.6
MAGNESIUM SERPL-MCNC: 1.6 MG/DL
POTASSIUM SERPL-SCNC: 3.8 MMOL/L
PROT SERPL-MCNC: 8.5 G/DL
PROTHROMBIN TIME: 25.6 SEC
SODIUM SERPL-SCNC: 138 MMOL/L

## 2018-02-19 PROCEDURE — 85610 PROTHROMBIN TIME: CPT

## 2018-02-19 PROCEDURE — 80048 BASIC METABOLIC PNL TOTAL CA: CPT

## 2018-02-19 PROCEDURE — 96366 THER/PROPH/DIAG IV INF ADDON: CPT

## 2018-02-19 PROCEDURE — 99213 OFFICE O/P EST LOW 20 MIN: CPT | Mod: ,,, | Performed by: INTERNAL MEDICINE

## 2018-02-19 PROCEDURE — 83735 ASSAY OF MAGNESIUM: CPT

## 2018-02-19 PROCEDURE — 99499 UNLISTED E&M SERVICE: CPT | Mod: ,,, | Performed by: PHYSICIAN ASSISTANT

## 2018-02-19 PROCEDURE — 83880 ASSAY OF NATRIURETIC PEPTIDE: CPT

## 2018-02-19 PROCEDURE — 25000003 PHARM REV CODE 250: Performed by: NURSE PRACTITIONER

## 2018-02-19 PROCEDURE — 36415 COLL VENOUS BLD VENIPUNCTURE: CPT

## 2018-02-19 PROCEDURE — 63600175 PHARM REV CODE 636 W HCPCS: Performed by: NURSE PRACTITIONER

## 2018-02-19 PROCEDURE — 96376 TX/PRO/DX INJ SAME DRUG ADON: CPT

## 2018-02-19 PROCEDURE — 63600175 PHARM REV CODE 636 W HCPCS: Performed by: PHYSICIAN ASSISTANT

## 2018-02-19 PROCEDURE — 80076 HEPATIC FUNCTION PANEL: CPT

## 2018-02-19 PROCEDURE — 96365 THER/PROPH/DIAG IV INF INIT: CPT

## 2018-02-19 RX ORDER — FUROSEMIDE 10 MG/ML
80 INJECTION INTRAMUSCULAR; INTRAVENOUS
Status: COMPLETED | OUTPATIENT
Start: 2018-02-19 | End: 2018-02-19

## 2018-02-19 RX ADMIN — FUROSEMIDE 20 MG/HR: 10 INJECTION, SOLUTION INTRAMUSCULAR; INTRAVENOUS at 08:02

## 2018-02-19 RX ADMIN — FUROSEMIDE 80 MG: 10 INJECTION, SOLUTION INTRAMUSCULAR; INTRAVENOUS at 09:02

## 2018-02-19 NOTE — PLAN OF CARE
Problem: Patient Care Overview  Goal: Plan of Care Review  Outcome: Ongoing (interventions implemented as appropriate)  Arrived from home in stable condition.Denies any acute distress at this time.Wt down 2.6 kg from 153.7 to 151.1 kg this visit.Orders given per KATHLEEN BROWN and carried out.IV started and labs sent.Lasix 80 mg ivp and lasix drip @2 0 cc/hr x 6 hours.Will continue to monitor

## 2018-02-19 NOTE — PLAN OF CARE
Problem: Cardiac: Heart Failure (Adult)  Goal: Signs and Symptoms of Listed Potential Problems Will be Absent, Minimized or Managed (Cardiac: Heart Failure)  Signs and symptoms of listed potential problems will be absent, minimized or managed by discharge/transition of care (reference Cardiac: Heart Failure (Adult) CPG).   KATHLEEN BROWN here,notified of am labs and no pm labs ordered.Pt took own home dose of Mag ox and Potassium .Reinforced the importance of following  medication regimen correctly.Infusin stopped 1 hour early per pt request because pt complaint of stomach ache after eating.Tolerated infusion well.Urine output total 1650 ml with net -912 ml.Discharge home,vss,neuro intact. W/C offered and refused.Left floor ambulating in stable condition.Return in 2 weeks.Huan.Called unit to reported he made it home and feels better at this time.

## 2018-02-23 NOTE — PROGRESS NOTES
Subjective:    Patient ID:  Yong Mcintyre is a 42 y.o. male who presents for follow-up of PH    HPI  43 y/o AAM presents for biweekly outpatient IV diuretic tx. He remains on Lasix 80 mg bid, Tracleer and Revatio and is on home O2 at 3 LPM. Has gained ~ 80# over the last year 2/2 dietary indiscretion and not going to the gym. Sees Dr. Head at [facility] once a year.    He reports he recently had a head cold with sinus pressure and minor fever, but now feels great.  He has no new complaints this morning.  His weight is stable and about the same as last visit    Review of Systems   Constitution: Positive for weight gain.   Cardiovascular: Positive for dyspnea on exertion. Negative for leg swelling, orthopnea and paroxysmal nocturnal dyspnea.   Respiratory: Positive for shortness of breath.    Gastrointestinal: Negative for nausea and vomiting.   Neurological: Negative for dizziness and light-headedness.        Objective:    Physical Exam   Constitutional: He is oriented to person, place, and time. He appears well-developed and well-nourished.   /77 (BP Location: Right arm, Patient Position: Sitting)   Pulse 90   Temp 98.6 °F (37 °C) (Oral)   Resp 16   Wt (!) 154.5 kg (340 lb 8 oz)   SpO2 (!) 93%   BMI 56.66 kg/m²      Neck: No JVD present.   Cardiovascular: Normal rate, regular rhythm and normal heart sounds.    Pulses:       Radial pulses are 2+ on the right side, and 2+ on the left side.   Pulmonary/Chest: Effort normal and breath sounds normal.   Abdominal: Soft. Bowel sounds are normal. There is no tenderness.   Musculoskeletal: He exhibits no edema.   Neurological: He is alert and oriented to person, place, and time.   Skin: Skin is warm and dry.         Assessment:       1. Chronic pulmonary heart disease    2. Long term current use of anticoagulant therapy    3. Chronic cardiopulmonary disease         Plan:     Lasix 80 mg IVP, then 20 mg/hr X 6 hours.  Followed by Dr. Head at  [facility]  Continue biweekly outpatient infusions.

## 2018-03-02 DIAGNOSIS — I27.0 PRIMARY PULMONARY HYPERTENSION: Primary | ICD-10-CM

## 2018-03-05 ENCOUNTER — ANTI-COAG VISIT (OUTPATIENT)
Dept: CARDIOLOGY | Facility: CLINIC | Age: 43
End: 2018-03-05

## 2018-03-05 ENCOUNTER — INFUSION (OUTPATIENT)
Dept: CARDIOLOGY | Facility: HOSPITAL | Age: 43
End: 2018-03-05
Attending: INTERNAL MEDICINE
Payer: MEDICARE

## 2018-03-05 VITALS
HEART RATE: 87 BPM | RESPIRATION RATE: 18 BRPM | WEIGHT: 315 LBS | DIASTOLIC BLOOD PRESSURE: 62 MMHG | SYSTOLIC BLOOD PRESSURE: 115 MMHG | OXYGEN SATURATION: 95 % | BODY MASS INDEX: 56.83 KG/M2

## 2018-03-05 DIAGNOSIS — Z79.01 LONG TERM CURRENT USE OF ANTICOAGULANT THERAPY: ICD-10-CM

## 2018-03-05 DIAGNOSIS — I27.9 CHRONIC PULMONARY HEART DISEASE: Primary | ICD-10-CM

## 2018-03-05 DIAGNOSIS — I27.9 CHRONIC CARDIOPULMONARY DISEASE: ICD-10-CM

## 2018-03-05 LAB
ANION GAP SERPL CALC-SCNC: 8 MMOL/L
BNP SERPL-MCNC: <10 PG/ML
BUN SERPL-MCNC: 16 MG/DL
CALCIUM SERPL-MCNC: 9.4 MG/DL
CHLORIDE SERPL-SCNC: 94 MMOL/L
CO2 SERPL-SCNC: 36 MMOL/L
CREAT SERPL-MCNC: 1 MG/DL
EST. GFR  (AFRICAN AMERICAN): >60 ML/MIN/1.73 M^2
EST. GFR  (NON AFRICAN AMERICAN): >60 ML/MIN/1.73 M^2
GLUCOSE SERPL-MCNC: 106 MG/DL
INR PPP: 2
MAGNESIUM SERPL-MCNC: 2 MG/DL
POTASSIUM SERPL-SCNC: 4.1 MMOL/L
PROTHROMBIN TIME: 19.8 SEC
SODIUM SERPL-SCNC: 138 MMOL/L

## 2018-03-05 PROCEDURE — 85610 PROTHROMBIN TIME: CPT

## 2018-03-05 PROCEDURE — 25000003 PHARM REV CODE 250: Performed by: PHYSICIAN ASSISTANT

## 2018-03-05 PROCEDURE — 63600175 PHARM REV CODE 636 W HCPCS: Performed by: PHYSICIAN ASSISTANT

## 2018-03-05 PROCEDURE — 96366 THER/PROPH/DIAG IV INF ADDON: CPT

## 2018-03-05 PROCEDURE — 96365 THER/PROPH/DIAG IV INF INIT: CPT

## 2018-03-05 PROCEDURE — 99213 OFFICE O/P EST LOW 20 MIN: CPT | Mod: ,,, | Performed by: INTERNAL MEDICINE

## 2018-03-05 PROCEDURE — 36415 COLL VENOUS BLD VENIPUNCTURE: CPT

## 2018-03-05 PROCEDURE — 99499 UNLISTED E&M SERVICE: CPT | Mod: ,,, | Performed by: PHYSICIAN ASSISTANT

## 2018-03-05 PROCEDURE — 83735 ASSAY OF MAGNESIUM: CPT

## 2018-03-05 PROCEDURE — 80048 BASIC METABOLIC PNL TOTAL CA: CPT

## 2018-03-05 PROCEDURE — 83880 ASSAY OF NATRIURETIC PEPTIDE: CPT

## 2018-03-05 PROCEDURE — 96376 TX/PRO/DX INJ SAME DRUG ADON: CPT

## 2018-03-05 RX ORDER — FUROSEMIDE 10 MG/ML
80 INJECTION INTRAMUSCULAR; INTRAVENOUS
Status: COMPLETED | OUTPATIENT
Start: 2018-03-05 | End: 2018-03-05

## 2018-03-05 RX ADMIN — FUROSEMIDE 80 MG: 10 INJECTION, SOLUTION INTRAMUSCULAR; INTRAVENOUS at 08:03

## 2018-03-05 RX ADMIN — FUROSEMIDE 20 MG/HR: 10 INJECTION, SOLUTION INTRAVENOUS at 08:03

## 2018-03-05 NOTE — PLAN OF CARE
Problem: Patient Care Overview  Goal: Plan of Care Review  Outcome: Ongoing (interventions implemented as appropriate)  Arrived ambulating from home in stable condition. Denies any acute distress at this time.Wt up 3.2 kg from 151.7 to 154.9 kg this visit.Orders given per BRIDGETT BROWN and carried out.IV started and labs sent.Lasix 80 mg ivp and lasix drip @ 20 cc/hr x 6 hours.Will continue to monitor

## 2018-03-05 NOTE — PLAN OF CARE
Problem: Cardiac: Heart Failure (Adult)  Goal: Signs and Symptoms of Listed Potential Problems Will be Absent, Minimized or Managed (Cardiac: Heart Failure)  Signs and symptoms of listed potential problems will be absent, minimized or managed by discharge/transition of care (reference Cardiac: Heart Failure (Adult) CPG).   Outcome: Ongoing (interventions implemented as appropriate)  BRIDGETT BROWN here,notified of am labs and no pm labs ordered.Reinforced medication regimen and exercise program.Patient reported he started going back to the gym.Feel these infusions are helping.Tolerated infusion well.Urine output 2525 ml total with net -1767 ml.Discharge home,vss,neuro intact.Denies any acute distress/concerns at this time.Left floor ambulating in stable condition.Return in 2 weeks.Huan

## 2018-03-05 NOTE — PROGRESS NOTES
Subjective:    Patient ID:  Yong Mcintyre is a 42 y.o. male who presents for follow-up of PH    Congestive Heart Failure   Associated symptoms include shortness of breath.     43 y/o AAM presents for biweekly outpatient IV diuretic tx. He remains on Lasix 80 mg bid, Tracleer and Revatio and is on home O2 at 3 LPM. Has gained ~ 80# over the last year 2/2 dietary indiscretion and not going to the gym. Sees Dr. Head at [facility] once a year.    Patient ran out of advair which he noticed affected his breathing. Now that he has a prescription again, he plans to go back to the gym. No new complaints otherwise.     Review of Systems   Constitution: Positive for weight gain.   Cardiovascular: Positive for dyspnea on exertion. Negative for leg swelling, orthopnea and paroxysmal nocturnal dyspnea.   Respiratory: Positive for shortness of breath.    Gastrointestinal: Negative for nausea and vomiting.   Neurological: Negative for dizziness and light-headedness.        Objective:    Physical Exam   Constitutional: He is oriented to person, place, and time. He appears well-developed and well-nourished.   /77 (BP Location: Right arm, Patient Position: Sitting)   Pulse 90   Temp 98.6 °F (37 °C) (Oral)   Resp 16   Wt (!) 154.5 kg (340 lb 8 oz)   SpO2 (!) 93%   BMI 56.66 kg/m²      Neck: JVD present.   Cardiovascular: Normal rate, regular rhythm and normal heart sounds.    Pulses:       Radial pulses are 2+ on the right side, and 2+ on the left side.   Pulmonary/Chest: Effort normal and breath sounds normal.   Abdominal: Soft. Bowel sounds are normal. There is no tenderness.   Musculoskeletal: He exhibits no edema.   Neurological: He is alert and oriented to person, place, and time.   Skin: Skin is warm and dry.         Assessment:       1. Chronic pulmonary heart disease    2. Chronic cardiopulmonary disease    3. Long term current use of anticoagulant therapy         Plan:     Lasix 80 mg IVP, then 20 mg/hr X 6  hours.  Followed by Dr. Head at [facility]  Continue biweekly outpatient infusions.

## 2018-03-16 DIAGNOSIS — I27.0 PRIMARY PULMONARY HYPERTENSION: Primary | ICD-10-CM

## 2018-03-21 NOTE — PROGRESS NOTES
Patient states that he did not go to his infusion on Monday so his INR was not done, next infusion scheduled 4/2

## 2018-03-29 DIAGNOSIS — I27.0 PRIMARY PULMONARY HYPERTENSION: Primary | ICD-10-CM

## 2018-04-02 ENCOUNTER — INFUSION (OUTPATIENT)
Dept: CARDIOLOGY | Facility: HOSPITAL | Age: 43
End: 2018-04-02
Attending: INTERNAL MEDICINE
Payer: MEDICARE

## 2018-04-02 VITALS
DIASTOLIC BLOOD PRESSURE: 59 MMHG | OXYGEN SATURATION: 96 % | BODY MASS INDEX: 57.21 KG/M2 | WEIGHT: 315 LBS | SYSTOLIC BLOOD PRESSURE: 115 MMHG | RESPIRATION RATE: 19 BRPM | HEART RATE: 94 BPM | TEMPERATURE: 98 F

## 2018-04-02 DIAGNOSIS — I27.9 CHRONIC PULMONARY HEART DISEASE: ICD-10-CM

## 2018-04-02 DIAGNOSIS — I27.0 PRIMARY PULMONARY HYPERTENSION: Primary | ICD-10-CM

## 2018-04-02 DIAGNOSIS — Z79.01 LONG TERM CURRENT USE OF ANTICOAGULANT THERAPY: ICD-10-CM

## 2018-04-02 LAB
ALBUMIN SERPL BCP-MCNC: 3 G/DL
ALP SERPL-CCNC: 133 U/L
ALT SERPL W/O P-5'-P-CCNC: 19 U/L
ANION GAP SERPL CALC-SCNC: 8 MMOL/L
AST SERPL-CCNC: 25 U/L
BILIRUB DIRECT SERPL-MCNC: 0.3 MG/DL
BILIRUB SERPL-MCNC: 0.6 MG/DL
BNP SERPL-MCNC: <10 PG/ML
BUN SERPL-MCNC: 20 MG/DL
CALCIUM SERPL-MCNC: 9 MG/DL
CHLORIDE SERPL-SCNC: 96 MMOL/L
CO2 SERPL-SCNC: 33 MMOL/L
CREAT SERPL-MCNC: 1.2 MG/DL
EST. GFR  (AFRICAN AMERICAN): >60 ML/MIN/1.73 M^2
EST. GFR  (NON AFRICAN AMERICAN): >60 ML/MIN/1.73 M^2
GLUCOSE SERPL-MCNC: 94 MG/DL
INR PPP: 2.8
MAGNESIUM SERPL-MCNC: 2.1 MG/DL
POTASSIUM SERPL-SCNC: 4.7 MMOL/L
PROT SERPL-MCNC: 8.5 G/DL
PROTHROMBIN TIME: 27.1 SEC
SODIUM SERPL-SCNC: 137 MMOL/L

## 2018-04-02 PROCEDURE — 36415 COLL VENOUS BLD VENIPUNCTURE: CPT

## 2018-04-02 PROCEDURE — 63600175 PHARM REV CODE 636 W HCPCS: Performed by: NURSE PRACTITIONER

## 2018-04-02 PROCEDURE — 96365 THER/PROPH/DIAG IV INF INIT: CPT

## 2018-04-02 PROCEDURE — 85610 PROTHROMBIN TIME: CPT

## 2018-04-02 PROCEDURE — 83880 ASSAY OF NATRIURETIC PEPTIDE: CPT

## 2018-04-02 PROCEDURE — 80076 HEPATIC FUNCTION PANEL: CPT

## 2018-04-02 PROCEDURE — 63600175 PHARM REV CODE 636 W HCPCS: Performed by: PHYSICIAN ASSISTANT

## 2018-04-02 PROCEDURE — 25000003 PHARM REV CODE 250: Performed by: PHYSICIAN ASSISTANT

## 2018-04-02 PROCEDURE — 83735 ASSAY OF MAGNESIUM: CPT

## 2018-04-02 PROCEDURE — 99213 OFFICE O/P EST LOW 20 MIN: CPT | Mod: ,,, | Performed by: INTERNAL MEDICINE

## 2018-04-02 PROCEDURE — 96366 THER/PROPH/DIAG IV INF ADDON: CPT

## 2018-04-02 PROCEDURE — 96376 TX/PRO/DX INJ SAME DRUG ADON: CPT

## 2018-04-02 PROCEDURE — 80048 BASIC METABOLIC PNL TOTAL CA: CPT

## 2018-04-02 RX ORDER — FUROSEMIDE 10 MG/ML
80 INJECTION INTRAMUSCULAR; INTRAVENOUS
Status: COMPLETED | OUTPATIENT
Start: 2018-04-02 | End: 2018-04-02

## 2018-04-02 RX ADMIN — FUROSEMIDE 20 MG/HR: 10 INJECTION, SOLUTION INTRAVENOUS at 08:04

## 2018-04-02 RX ADMIN — FUROSEMIDE 80 MG: 10 INJECTION, SOLUTION INTRAMUSCULAR; INTRAVENOUS at 08:04

## 2018-04-02 NOTE — PROGRESS NOTES
Subjective:    Patient ID:  Yong Mcintyre is a 42 y.o. male who presents for follow-up of PH    Congestive Heart Failure   Associated symptoms include shortness of breath.     43 y/o AAM presents for biweekly outpatient IV diuretic tx. He remains on Lasix 80 mg bid, Tracleer and Revatio and is on home O2 at 3 LPM. Has gained ~ 80# over the last year 2/2 dietary indiscretion and not going to the gym. Sees Dr. Head at [facility] once a year.  No new complaints.       Review of Systems   Constitution: Positive for weight gain.   Cardiovascular: Positive for dyspnea on exertion. Negative for leg swelling, orthopnea and paroxysmal nocturnal dyspnea.   Respiratory: Positive for shortness of breath.    Gastrointestinal: Negative for nausea and vomiting.   Neurological: Negative for dizziness and light-headedness.        Objective:    Physical Exam   Constitutional: He is oriented to person, place, and time. He appears well-developed and well-nourished.   /77 (BP Location: Right arm, Patient Position: Sitting)   Pulse 90   Temp 98.6 °F (37 °C) (Oral)   Resp 16   Wt (!) 154.5 kg (340 lb 8 oz)   SpO2 (!) 93%   BMI 56.66 kg/m²      Neck: JVD present.   Cardiovascular: Normal rate, regular rhythm and normal heart sounds.    Pulses:       Radial pulses are 2+ on the right side, and 2+ on the left side.   Pulmonary/Chest: Effort normal and breath sounds normal.   Abdominal: Soft. Bowel sounds are normal. There is no tenderness.   Musculoskeletal: He exhibits no edema.   Neurological: He is alert and oriented to person, place, and time.   Skin: Skin is warm and dry.         Assessment:       1. Primary pulmonary hypertension    2. Chronic pulmonary heart disease    3. Long term current use of anticoagulant therapy         Plan:     Lasix 80 mg IVP, then 20 mg/hr X 6 hours.  Followed by Dr. Head at [facility]  Continue biweekly outpatient infusions.

## 2018-04-02 NOTE — PLAN OF CARE
Problem: Cardiac: Heart Failure (Adult)  Goal: Signs and Symptoms of Listed Potential Problems Will be Absent, Minimized or Managed (Cardiac: Heart Failure)  Signs and symptoms of listed potential problems will be absent, minimized or managed by discharge/transition of care (reference Cardiac: Heart Failure (Adult) CPG).   Outcome: Ongoing (interventions implemented as appropriate)  S Bryan notified of am labs and no pm labs ordered.Pt reported he's taking his Kcl/Mag ox as ordered.Pt shared this evening that he has been feeling depressed and not wanting to do anything.Also expressed he will not hurt himself in anyway because he believed in God.Spoke at length with pt and comfort measure offered and accepted.Encouragement given to go back to the Gym,walk around the park/neighborhood or [employer] to meet new people.Reinforced medication regimen,low sodium diet and 1500 cc fluid.Tolerated infusion well.Urine output 3105 ml total with net - 2635 ml.Discharge home,vss,neuro intact.Denies any acute distress/concerns at this time.Left floor ambulating in stable condition.Return in 2 weeks.Huan

## 2018-04-02 NOTE — PLAN OF CARE
Problem: Patient Care Overview  Goal: Plan of Care Review  Outcome: Ongoing (interventions implemented as appropriate)  Arrived from home in stable condition.Denies any acute distress at this time.Missed last visit.Wt up 1.05 kg from 154.9 to 155.95 kg this visit.Orders given per S Bryan NP and carried out.IV started and labs sent.Lasix 80 mg ivp followed by lasix drip @ 20 cc/hr x 6 hours.Will continue to monitor

## 2018-04-03 ENCOUNTER — ANTI-COAG VISIT (OUTPATIENT)
Dept: CARDIOLOGY | Facility: CLINIC | Age: 43
End: 2018-04-03

## 2018-04-03 DIAGNOSIS — Z79.01 LONG TERM CURRENT USE OF ANTICOAGULANT THERAPY: ICD-10-CM

## 2018-04-13 DIAGNOSIS — I27.0 PRIMARY PULMONARY HYPERTENSION: Primary | ICD-10-CM

## 2018-04-16 ENCOUNTER — INFUSION (OUTPATIENT)
Dept: CARDIOLOGY | Facility: HOSPITAL | Age: 43
End: 2018-04-16
Attending: INTERNAL MEDICINE
Payer: MEDICARE

## 2018-04-16 ENCOUNTER — ANTI-COAG VISIT (OUTPATIENT)
Dept: CARDIOLOGY | Facility: CLINIC | Age: 43
End: 2018-04-16

## 2018-04-16 VITALS
RESPIRATION RATE: 19 BRPM | HEART RATE: 91 BPM | WEIGHT: 315 LBS | SYSTOLIC BLOOD PRESSURE: 119 MMHG | OXYGEN SATURATION: 94 % | TEMPERATURE: 98 F | BODY MASS INDEX: 56.94 KG/M2 | DIASTOLIC BLOOD PRESSURE: 71 MMHG

## 2018-04-16 DIAGNOSIS — Z79.01 LONG TERM CURRENT USE OF ANTICOAGULANT THERAPY: ICD-10-CM

## 2018-04-16 DIAGNOSIS — I27.9 CHRONIC PULMONARY HEART DISEASE: ICD-10-CM

## 2018-04-16 DIAGNOSIS — I27.0 PRIMARY PULMONARY HYPERTENSION: ICD-10-CM

## 2018-04-16 LAB
ALBUMIN SERPL BCP-MCNC: 2.8 G/DL
ALP SERPL-CCNC: 128 U/L
ALT SERPL W/O P-5'-P-CCNC: 16 U/L
ANION GAP SERPL CALC-SCNC: 9 MMOL/L
AST SERPL-CCNC: 22 U/L
BILIRUB DIRECT SERPL-MCNC: 0.3 MG/DL
BILIRUB SERPL-MCNC: 0.6 MG/DL
BNP SERPL-MCNC: <10 PG/ML
BUN SERPL-MCNC: 17 MG/DL
CALCIUM SERPL-MCNC: 9 MG/DL
CHLORIDE SERPL-SCNC: 95 MMOL/L
CO2 SERPL-SCNC: 35 MMOL/L
CREAT SERPL-MCNC: 1.1 MG/DL
EST. GFR  (AFRICAN AMERICAN): >60 ML/MIN/1.73 M^2
EST. GFR  (NON AFRICAN AMERICAN): >60 ML/MIN/1.73 M^2
GLUCOSE SERPL-MCNC: 94 MG/DL
INR PPP: 1.5
MAGNESIUM SERPL-MCNC: 1.8 MG/DL
POTASSIUM SERPL-SCNC: 3.7 MMOL/L
PROT SERPL-MCNC: 8.1 G/DL
PROTHROMBIN TIME: 14.6 SEC
SODIUM SERPL-SCNC: 139 MMOL/L

## 2018-04-16 PROCEDURE — 80076 HEPATIC FUNCTION PANEL: CPT

## 2018-04-16 PROCEDURE — 85610 PROTHROMBIN TIME: CPT

## 2018-04-16 PROCEDURE — 96365 THER/PROPH/DIAG IV INF INIT: CPT

## 2018-04-16 PROCEDURE — 80048 BASIC METABOLIC PNL TOTAL CA: CPT

## 2018-04-16 PROCEDURE — 83880 ASSAY OF NATRIURETIC PEPTIDE: CPT

## 2018-04-16 PROCEDURE — 83735 ASSAY OF MAGNESIUM: CPT

## 2018-04-16 PROCEDURE — 96376 TX/PRO/DX INJ SAME DRUG ADON: CPT

## 2018-04-16 PROCEDURE — 25000003 PHARM REV CODE 250: Performed by: PHYSICIAN ASSISTANT

## 2018-04-16 PROCEDURE — 63600175 PHARM REV CODE 636 W HCPCS: Performed by: PHYSICIAN ASSISTANT

## 2018-04-16 PROCEDURE — 99213 OFFICE O/P EST LOW 20 MIN: CPT | Mod: ,,, | Performed by: INTERNAL MEDICINE

## 2018-04-16 PROCEDURE — 96366 THER/PROPH/DIAG IV INF ADDON: CPT

## 2018-04-16 RX ORDER — FUROSEMIDE 10 MG/ML
80 INJECTION INTRAMUSCULAR; INTRAVENOUS
Status: COMPLETED | OUTPATIENT
Start: 2018-04-16 | End: 2018-04-16

## 2018-04-16 RX ADMIN — FUROSEMIDE 20 MG/HR: 10 INJECTION, SOLUTION INTRAMUSCULAR; INTRAVENOUS at 08:04

## 2018-04-16 RX ADMIN — FUROSEMIDE 80 MG: 10 INJECTION, SOLUTION INTRAMUSCULAR; INTRAVENOUS at 08:04

## 2018-04-16 NOTE — PLAN OF CARE
Problem: Cardiac: Heart Failure (Adult)  Goal: Signs and Symptoms of Listed Potential Problems Will be Absent, Minimized or Managed (Cardiac: Heart Failure)  Signs and symptoms of listed potential problems will be absent, minimized or managed by discharge/transition of care (reference Cardiac: Heart Failure (Adult) CPG).   Outcome: Ongoing (interventions implemented as appropriate)  KATHLEEN BROWN here,notified of am labs and no pm labs ordered.Pt will take own home dose of KCL upon arrival home.Pt seems happier today.Stated he started back going to the gym and met a friend. Reinforced medication regimen,low sodium diet and 1500 cc fluid restiction.Tolerated infusion well.Urine output total 1825 ml with net -1165 ml.Discharge home,vss,neuro intact.Denies any acute distress/concerns at this time.Left floor ambulating in stable condition.Return in 2 weeks.Huan

## 2018-04-16 NOTE — PROGRESS NOTES
Questioned pt confirmed coumadin dose; pt states he did not receive refill on warfarin until 4/11; missed dose 4/9 and 4/10  Taking mucinex on 4/15  No green intake  No other changes

## 2018-04-16 NOTE — PLAN OF CARE
Problem: Patient Care Overview  Goal: Plan of Care Review  Outcome: Ongoing (interventions implemented as appropriate)  Arrived from home in stable condition.Denies any acute distress at this time.Wt 0.45 kg from 155.95 to 155.20 kg this visit.Orders given per KATHLEEN BROWN and carried out.IV started and labs sent.Lasix 80 mg ivp followed by lasix drip @ 20 cc/hr x 6 hours.Will continue to monitor

## 2018-04-16 NOTE — PROGRESS NOTES
Subjective:    Patient ID:  Yong Mcintyre is a 42 y.o. male who presents for follow-up of PH    Congestive Heart Failure   Associated symptoms include shortness of breath.     43 y/o AAM presents for biweekly outpatient IV diuretic tx. He remains on Lasix 80 mg bid, Tracleer and Revatio and is on home O2 at 3 LPM. Has gained ~ 80# over the last year 2/2 dietary indiscretion and not going to the gym. Sees Dr. Head at [facility] once a year.  No new complaints.       Review of Systems   Constitution: Positive for weight gain.   Cardiovascular: Positive for dyspnea on exertion. Negative for leg swelling, orthopnea and paroxysmal nocturnal dyspnea.   Respiratory: Positive for shortness of breath.    Gastrointestinal: Negative for nausea and vomiting.   Neurological: Negative for dizziness and light-headedness.        Objective:    Physical Exam   Constitutional: He is oriented to person, place, and time. He appears well-developed and well-nourished.   /77 (BP Location: Right arm, Patient Position: Sitting)   Pulse 90   Temp 98.6 °F (37 °C) (Oral)   Resp 16   Wt (!) 154.5 kg (340 lb 8 oz)   SpO2 (!) 93%   BMI 56.66 kg/m²      Neck: JVD present.   Cardiovascular: Normal rate, regular rhythm and normal heart sounds.    Pulses:       Radial pulses are 2+ on the right side, and 2+ on the left side.   Pulmonary/Chest: Effort normal and breath sounds normal.   Abdominal: Soft. Bowel sounds are normal. There is no tenderness.   Musculoskeletal: He exhibits no edema.   Neurological: He is alert and oriented to person, place, and time.   Skin: Skin is warm and dry.         Assessment:       1. Chronic pulmonary heart disease    2. Long term current use of anticoagulant therapy    3. Primary pulmonary hypertension         Plan:     Lasix 80 mg IVP, then 20 mg/hr X 6 hours.  Followed by Dr. Head at [facility]  Continue biweekly outpatient infusions.

## 2018-04-27 DIAGNOSIS — I27.0 PRIMARY PULMONARY HYPERTENSION: Primary | ICD-10-CM

## 2018-04-30 ENCOUNTER — ANTI-COAG VISIT (OUTPATIENT)
Dept: CARDIOLOGY | Facility: CLINIC | Age: 43
End: 2018-04-30

## 2018-04-30 ENCOUNTER — INFUSION (OUTPATIENT)
Dept: CARDIOLOGY | Facility: HOSPITAL | Age: 43
End: 2018-04-30
Attending: INTERNAL MEDICINE
Payer: MEDICARE

## 2018-04-30 VITALS
WEIGHT: 315 LBS | SYSTOLIC BLOOD PRESSURE: 124 MMHG | DIASTOLIC BLOOD PRESSURE: 83 MMHG | OXYGEN SATURATION: 96 % | TEMPERATURE: 98 F | BODY MASS INDEX: 57.85 KG/M2 | HEART RATE: 82 BPM | RESPIRATION RATE: 28 BRPM

## 2018-04-30 DIAGNOSIS — Z79.01 LONG TERM CURRENT USE OF ANTICOAGULANT THERAPY: ICD-10-CM

## 2018-04-30 DIAGNOSIS — I27.0 PRIMARY PULMONARY HYPERTENSION: Primary | ICD-10-CM

## 2018-04-30 LAB
ANION GAP SERPL CALC-SCNC: 7 MMOL/L
BNP SERPL-MCNC: <10 PG/ML
BUN SERPL-MCNC: 27 MG/DL
CALCIUM SERPL-MCNC: 9.2 MG/DL
CHLORIDE SERPL-SCNC: 94 MMOL/L
CO2 SERPL-SCNC: 36 MMOL/L
CREAT SERPL-MCNC: 1.1 MG/DL
EST. GFR  (AFRICAN AMERICAN): >60 ML/MIN/1.73 M^2
EST. GFR  (NON AFRICAN AMERICAN): >60 ML/MIN/1.73 M^2
GLUCOSE SERPL-MCNC: 98 MG/DL
INR PPP: 2.7
MAGNESIUM SERPL-MCNC: 1.7 MG/DL
POTASSIUM SERPL-SCNC: 3.9 MMOL/L
PROTHROMBIN TIME: 26.2 SEC
SODIUM SERPL-SCNC: 137 MMOL/L

## 2018-04-30 PROCEDURE — 63600175 PHARM REV CODE 636 W HCPCS: Performed by: PHYSICIAN ASSISTANT

## 2018-04-30 PROCEDURE — 96376 TX/PRO/DX INJ SAME DRUG ADON: CPT

## 2018-04-30 PROCEDURE — 99213 OFFICE O/P EST LOW 20 MIN: CPT | Mod: ,,, | Performed by: INTERNAL MEDICINE

## 2018-04-30 PROCEDURE — 83880 ASSAY OF NATRIURETIC PEPTIDE: CPT

## 2018-04-30 PROCEDURE — 80048 BASIC METABOLIC PNL TOTAL CA: CPT

## 2018-04-30 PROCEDURE — 85610 PROTHROMBIN TIME: CPT

## 2018-04-30 PROCEDURE — 96365 THER/PROPH/DIAG IV INF INIT: CPT

## 2018-04-30 PROCEDURE — 83735 ASSAY OF MAGNESIUM: CPT

## 2018-04-30 PROCEDURE — 25000003 PHARM REV CODE 250: Performed by: PHYSICIAN ASSISTANT

## 2018-04-30 PROCEDURE — 96366 THER/PROPH/DIAG IV INF ADDON: CPT

## 2018-04-30 RX ORDER — LANOLIN ALCOHOL/MO/W.PET/CERES
400 CREAM (GRAM) TOPICAL ONCE
Status: COMPLETED | OUTPATIENT
Start: 2018-04-30 | End: 2018-04-30

## 2018-04-30 RX ORDER — METOLAZONE 5 MG/1
5 TABLET ORAL
Status: COMPLETED | OUTPATIENT
Start: 2018-04-30 | End: 2018-04-30

## 2018-04-30 RX ORDER — FUROSEMIDE 10 MG/ML
80 INJECTION INTRAMUSCULAR; INTRAVENOUS
Status: COMPLETED | OUTPATIENT
Start: 2018-04-30 | End: 2018-04-30

## 2018-04-30 RX ADMIN — METOLAZONE 5 MG: 5 TABLET ORAL at 10:04

## 2018-04-30 RX ADMIN — FUROSEMIDE 20 MG/HR: 10 INJECTION, SOLUTION INTRAVENOUS at 08:04

## 2018-04-30 RX ADMIN — Medication 400 MG: at 10:04

## 2018-04-30 RX ADMIN — FUROSEMIDE 80 MG: 10 INJECTION, SOLUTION INTRAMUSCULAR; INTRAVENOUS at 08:04

## 2018-04-30 NOTE — PLAN OF CARE
Problem: Patient Care Overview  Goal: Plan of Care Review  Outcome: Ongoing (interventions implemented as appropriate)  Arrived from home in stable condition via.Complaint of feeling bloated/gas  3 days.Wt up 2.0 kg from 155.70 to 156.70 kg this visit.Orders given per S Davin PA and carried out.IV started and labs sent.Lasix 80 mg ivp followed by lasix drip @20 cc/hr x 6 hours.Will continue to monitor

## 2018-04-30 NOTE — PROGRESS NOTES
Subjective:    Patient ID:  Yong Mcintyre is a 42 y.o. male who presents for follow-up of PH    Congestive Heart Failure   Pertinent negatives include no nausea or vomiting.     43 y/o AAM presents for biweekly outpatient IV diuretic tx. He remains on Lasix 80 mg bid, Tracleer and Revatio and is on home O2 at 3 LPM. Sees Dr. Head at [facility] once a year.  Patient reports he has been taking milk of magnesia and has some gas pain.  He will try gas x.  His weight is up 2kg since last visit: 157 from 155 kg.  Magnesium is 1.7: patient reports he takes Magnesium replacement when he remembers     Review of Systems   Constitution: Positive for weight gain.   Cardiovascular: Positive for dyspnea on exertion. Negative for leg swelling, orthopnea and paroxysmal nocturnal dyspnea.   Respiratory: Positive for shortness of breath.    Gastrointestinal: Negative for nausea and vomiting.   Neurological: Negative for dizziness and light-headedness.        Objective:    Physical Exam   Constitutional: He is oriented to person, place, and time. He appears well-developed and well-nourished.   /77 (BP Location: Right arm, Patient Position: Sitting)   Pulse 90   Temp 98.6 °F (37 °C) (Oral)   Resp 16   Wt (!) 154.5 kg (340 lb 8 oz)   SpO2 (!) 93%   BMI 56.66 kg/m²      Neck: JVD present.   Cardiovascular: Normal rate, regular rhythm and normal heart sounds.    Pulses:       Radial pulses are 2+ on the right side, and 2+ on the left side.   Pulmonary/Chest: Effort normal and breath sounds normal.   Abdominal: Soft. Bowel sounds are normal. There is no tenderness.   Musculoskeletal: He exhibits no edema.   Neurological: He is alert and oriented to person, place, and time.   Skin: Skin is warm and dry.         Assessment:       1. Primary pulmonary hypertension    2. Long term current use of anticoagulant therapy         Plan:     Lasix 80 mg IVP, then 20 mg/hr X 6 hours. Will give dose of Metolazone today  Followed by   Adilene at [facility]  Continue biweekly outpatient infusions.

## 2018-04-30 NOTE — PLAN OF CARE
Problem: Cardiac: Heart Failure (Adult)  Goal: Signs and Symptoms of Listed Potential Problems Will be Absent, Minimized or Managed (Cardiac: Heart Failure)  Signs and symptoms of listed potential problems will be absent, minimized or managed by discharge/transition of care (reference Cardiac: Heart Failure (Adult) CPG).   Outcome: Ongoing (interventions implemented as appropriate)  S Davin PA here,notified of am labs and no pm labs ordered.Pt took MOM over the weekend and its taken affect now with BM x 2.Reinforced medication regimen and can add gas-X for relief per PA.Tolerated infusion well.Urine output total 2225 ml with net -1585 ml.Discharge home,vss,neuro intact.Denies any acute distress/concerns at this time.Left floor in ambulating stable condition.Return in 2 weeks.Huan

## 2018-05-10 DIAGNOSIS — I27.0 PRIMARY PULMONARY HYPERTENSION: Primary | ICD-10-CM

## 2018-05-14 ENCOUNTER — ANTI-COAG VISIT (OUTPATIENT)
Dept: CARDIOLOGY | Facility: CLINIC | Age: 43
End: 2018-05-14

## 2018-05-14 ENCOUNTER — INFUSION (OUTPATIENT)
Dept: CARDIOLOGY | Facility: HOSPITAL | Age: 43
End: 2018-05-14
Attending: INTERNAL MEDICINE
Payer: MEDICARE

## 2018-05-14 VITALS
BODY MASS INDEX: 57.85 KG/M2 | WEIGHT: 315 LBS | TEMPERATURE: 98 F | DIASTOLIC BLOOD PRESSURE: 79 MMHG | RESPIRATION RATE: 22 BRPM | HEART RATE: 83 BPM | SYSTOLIC BLOOD PRESSURE: 118 MMHG

## 2018-05-14 DIAGNOSIS — I27.9 CHRONIC PULMONARY HEART DISEASE: ICD-10-CM

## 2018-05-14 DIAGNOSIS — Z79.01 LONG TERM CURRENT USE OF ANTICOAGULANT THERAPY: ICD-10-CM

## 2018-05-14 DIAGNOSIS — I27.0 PRIMARY PULMONARY HYPERTENSION: Primary | ICD-10-CM

## 2018-05-14 LAB
ALBUMIN SERPL BCP-MCNC: 2.7 G/DL
ALP SERPL-CCNC: 127 U/L
ALT SERPL W/O P-5'-P-CCNC: 20 U/L
ANION GAP SERPL CALC-SCNC: 5 MMOL/L
AST SERPL-CCNC: 23 U/L
BILIRUB DIRECT SERPL-MCNC: 0.2 MG/DL
BILIRUB SERPL-MCNC: 0.5 MG/DL
BNP SERPL-MCNC: <10 PG/ML
BUN SERPL-MCNC: 16 MG/DL
CALCIUM SERPL-MCNC: 8.8 MG/DL
CHLORIDE SERPL-SCNC: 97 MMOL/L
CO2 SERPL-SCNC: 37 MMOL/L
CREAT SERPL-MCNC: 1 MG/DL
EST. GFR  (AFRICAN AMERICAN): >60 ML/MIN/1.73 M^2
EST. GFR  (NON AFRICAN AMERICAN): >60 ML/MIN/1.73 M^2
GLUCOSE SERPL-MCNC: 93 MG/DL
INR PPP: 3.1
MAGNESIUM SERPL-MCNC: 1.6 MG/DL
POTASSIUM SERPL-SCNC: 3.7 MMOL/L
PROT SERPL-MCNC: 8 G/DL
PROTHROMBIN TIME: 30.4 SEC
SODIUM SERPL-SCNC: 139 MMOL/L

## 2018-05-14 PROCEDURE — 25000003 PHARM REV CODE 250: Performed by: PHYSICIAN ASSISTANT

## 2018-05-14 PROCEDURE — 63600175 PHARM REV CODE 636 W HCPCS: Performed by: PHYSICIAN ASSISTANT

## 2018-05-14 PROCEDURE — 80048 BASIC METABOLIC PNL TOTAL CA: CPT

## 2018-05-14 PROCEDURE — 85610 PROTHROMBIN TIME: CPT

## 2018-05-14 PROCEDURE — 83735 ASSAY OF MAGNESIUM: CPT

## 2018-05-14 PROCEDURE — 96376 TX/PRO/DX INJ SAME DRUG ADON: CPT

## 2018-05-14 PROCEDURE — 83880 ASSAY OF NATRIURETIC PEPTIDE: CPT

## 2018-05-14 PROCEDURE — 96365 THER/PROPH/DIAG IV INF INIT: CPT

## 2018-05-14 PROCEDURE — 96366 THER/PROPH/DIAG IV INF ADDON: CPT

## 2018-05-14 PROCEDURE — 25000003 PHARM REV CODE 250: Performed by: NURSE PRACTITIONER

## 2018-05-14 PROCEDURE — 80076 HEPATIC FUNCTION PANEL: CPT

## 2018-05-14 PROCEDURE — 63600175 PHARM REV CODE 636 W HCPCS: Performed by: NURSE PRACTITIONER

## 2018-05-14 PROCEDURE — 36415 COLL VENOUS BLD VENIPUNCTURE: CPT

## 2018-05-14 PROCEDURE — 99213 OFFICE O/P EST LOW 20 MIN: CPT | Mod: ,,, | Performed by: INTERNAL MEDICINE

## 2018-05-14 RX ORDER — LANOLIN ALCOHOL/MO/W.PET/CERES
400 CREAM (GRAM) TOPICAL ONCE
Status: COMPLETED | OUTPATIENT
Start: 2018-05-14 | End: 2018-05-14

## 2018-05-14 RX ORDER — POTASSIUM CHLORIDE 750 MG/1
40 TABLET, EXTENDED RELEASE ORAL ONCE
Status: COMPLETED | OUTPATIENT
Start: 2018-05-14 | End: 2018-05-14

## 2018-05-14 RX ORDER — FUROSEMIDE 10 MG/ML
80 INJECTION INTRAMUSCULAR; INTRAVENOUS
Status: COMPLETED | OUTPATIENT
Start: 2018-05-14 | End: 2018-05-14

## 2018-05-14 RX ADMIN — Medication 400 MG: at 11:05

## 2018-05-14 RX ADMIN — POTASSIUM CHLORIDE 40 MEQ: 10 TABLET, EXTENDED RELEASE ORAL at 11:05

## 2018-05-14 RX ADMIN — FUROSEMIDE 20 MG/HR: 10 INJECTION, SOLUTION INTRAMUSCULAR; INTRAVENOUS at 09:05

## 2018-05-14 RX ADMIN — Medication 400 MG: at 12:05

## 2018-05-14 RX ADMIN — FUROSEMIDE 80 MG: 10 INJECTION, SOLUTION INTRAMUSCULAR; INTRAVENOUS at 09:05

## 2018-05-14 NOTE — PLAN OF CARE
Problem: Cardiac: Heart Failure (Adult)  Goal: Signs and Symptoms of Listed Potential Problems Will be Absent, Minimized or Managed (Cardiac: Heart Failure)  Signs and symptoms of listed potential problems will be absent, minimized or managed by discharge/transition of care (reference Cardiac: Heart Failure (Adult) CPG).   Outcome: Ongoing (interventions implemented as appropriate)  D Yevgeniy BROWN here,notified of am labs and no pm labs ordered.Supplemented with kcl 40 mEq and Mag ox 800 mg here.Reinforced medication regimen and the importance of following as prescribed .Tolerated infusion well.Urine output total 2325 ml with net -1635 ml.Discharge home,vss,neuro intact.Denies any acute distress/concerns at this time.Left floor ambulating in stable condition.Return in [id number].Huan

## 2018-05-14 NOTE — PROGRESS NOTES
Subjective:    Patient ID:  Yong Mcintyre is a 42 y.o. male who presents for follow-up of PH    Congestive Heart Failure   Pertinent negatives include no nausea or vomiting.     43 y/o AAM presents for biweekly outpatient IV diuretic tx. He remains on Lasix 80 mg bid, Tracleer and Revatio and is on home O2 at 3 LPM. Sees Dr. Head at [facility] once a year. Weight is stable today    Review of Systems   Constitution: Positive for weight gain.   Cardiovascular: Positive for dyspnea on exertion. Negative for leg swelling, orthopnea and paroxysmal nocturnal dyspnea.   Respiratory: Positive for shortness of breath.    Gastrointestinal: Negative for nausea and vomiting.   Neurological: Negative for dizziness and light-headedness.        Objective:    Physical Exam   Constitutional: He is oriented to person, place, and time. He appears well-developed and well-nourished.   /77 (BP Location: Right arm, Patient Position: Sitting)   Pulse 90   Temp 98.6 °F (37 °C) (Oral)   Resp 16   Wt (!) 154.5 kg (340 lb 8 oz)   SpO2 (!) 93%   BMI 56.66 kg/m²      Neck: JVD present.   Cardiovascular: Normal rate, regular rhythm and normal heart sounds.    Pulmonary/Chest: Effort normal and breath sounds normal.   Abdominal: Soft. Bowel sounds are normal. There is no tenderness.   Musculoskeletal: He exhibits no edema.   Neurological: He is alert and oriented to person, place, and time.   Skin: Skin is warm and dry.         Assessment:       1. Primary pulmonary hypertension    2. Long term current use of anticoagulant therapy    3. Chronic pulmonary heart disease         Plan:     Lasix 80 mg IVP, then 20 mg/hr X 6 hours.   Followed by Dr. Head at [facility]  Continue biweekly outpatient infusions.

## 2018-05-14 NOTE — PLAN OF CARE
Problem: Patient Care Overview  Goal: Plan of Care Review  Outcome: Ongoing (interventions implemented as appropriate)  Arrived from home in stable condition.Denies any acute distress at this time.Wt remain unchanged 157.70 kg this visit.Orders given per D Rolling PA and carried out.IV started and labs sent.Lasix 80 mg ivp followed by lasix drip @20 cc/hr  x 6 hours.Will continue to monitor

## 2018-05-24 DIAGNOSIS — I27.0 PRIMARY PULMONARY HYPERTENSION: Primary | ICD-10-CM

## 2018-05-29 ENCOUNTER — INFUSION (OUTPATIENT)
Dept: CARDIOLOGY | Facility: HOSPITAL | Age: 43
End: 2018-05-29
Attending: INTERNAL MEDICINE
Payer: MEDICARE

## 2018-05-29 ENCOUNTER — ANTI-COAG VISIT (OUTPATIENT)
Dept: CARDIOLOGY | Facility: CLINIC | Age: 43
End: 2018-05-29

## 2018-05-29 VITALS
WEIGHT: 315 LBS | BODY MASS INDEX: 56.88 KG/M2 | SYSTOLIC BLOOD PRESSURE: 118 MMHG | DIASTOLIC BLOOD PRESSURE: 85 MMHG | HEART RATE: 84 BPM | TEMPERATURE: 98 F | RESPIRATION RATE: 25 BRPM

## 2018-05-29 DIAGNOSIS — Z79.01 LONG TERM CURRENT USE OF ANTICOAGULANT THERAPY: ICD-10-CM

## 2018-05-29 DIAGNOSIS — I27.9 CHRONIC PULMONARY HEART DISEASE: ICD-10-CM

## 2018-05-29 DIAGNOSIS — I27.0 PRIMARY PULMONARY HYPERTENSION: Primary | ICD-10-CM

## 2018-05-29 LAB
ANION GAP SERPL CALC-SCNC: 5 MMOL/L
BNP SERPL-MCNC: 14 PG/ML
BUN SERPL-MCNC: 10 MG/DL
CALCIUM SERPL-MCNC: 8.9 MG/DL
CHLORIDE SERPL-SCNC: 98 MMOL/L
CO2 SERPL-SCNC: 36 MMOL/L
CREAT SERPL-MCNC: 1.1 MG/DL
EST. GFR  (AFRICAN AMERICAN): >60 ML/MIN/1.73 M^2
EST. GFR  (NON AFRICAN AMERICAN): >60 ML/MIN/1.73 M^2
GLUCOSE SERPL-MCNC: 95 MG/DL
INR PPP: 1.9
MAGNESIUM SERPL-MCNC: 1.5 MG/DL
POTASSIUM SERPL-SCNC: 3.6 MMOL/L
PROTHROMBIN TIME: 18.2 SEC
SODIUM SERPL-SCNC: 139 MMOL/L

## 2018-05-29 PROCEDURE — 85610 PROTHROMBIN TIME: CPT

## 2018-05-29 PROCEDURE — 63600175 PHARM REV CODE 636 W HCPCS: Performed by: NURSE PRACTITIONER

## 2018-05-29 PROCEDURE — 96366 THER/PROPH/DIAG IV INF ADDON: CPT

## 2018-05-29 PROCEDURE — 83880 ASSAY OF NATRIURETIC PEPTIDE: CPT

## 2018-05-29 PROCEDURE — 80048 BASIC METABOLIC PNL TOTAL CA: CPT

## 2018-05-29 PROCEDURE — 83735 ASSAY OF MAGNESIUM: CPT

## 2018-05-29 PROCEDURE — 25000003 PHARM REV CODE 250: Performed by: PHYSICIAN ASSISTANT

## 2018-05-29 PROCEDURE — 96365 THER/PROPH/DIAG IV INF INIT: CPT

## 2018-05-29 PROCEDURE — 36415 COLL VENOUS BLD VENIPUNCTURE: CPT

## 2018-05-29 PROCEDURE — 96376 TX/PRO/DX INJ SAME DRUG ADON: CPT

## 2018-05-29 PROCEDURE — 99213 OFFICE O/P EST LOW 20 MIN: CPT | Mod: ,,, | Performed by: INTERNAL MEDICINE

## 2018-05-29 PROCEDURE — 25000003 PHARM REV CODE 250: Performed by: NURSE PRACTITIONER

## 2018-05-29 PROCEDURE — 63600175 PHARM REV CODE 636 W HCPCS: Performed by: PHYSICIAN ASSISTANT

## 2018-05-29 RX ORDER — POTASSIUM CHLORIDE 750 MG/1
40 TABLET, EXTENDED RELEASE ORAL
Status: COMPLETED | OUTPATIENT
Start: 2018-05-29 | End: 2018-05-29

## 2018-05-29 RX ORDER — ALBUTEROL SULFATE 90 UG/1
2 AEROSOL, METERED RESPIRATORY (INHALATION) EVERY 6 HOURS PRN
Qty: 18 G | Refills: 11 | Status: SHIPPED | OUTPATIENT
Start: 2018-05-29 | End: 2019-06-07 | Stop reason: SDUPTHER

## 2018-05-29 RX ORDER — FUROSEMIDE 10 MG/ML
80 INJECTION INTRAMUSCULAR; INTRAVENOUS
Status: COMPLETED | OUTPATIENT
Start: 2018-05-29 | End: 2018-05-29

## 2018-05-29 RX ADMIN — FUROSEMIDE 20 MG/HR: 10 INJECTION, SOLUTION INTRAMUSCULAR; INTRAVENOUS at 09:05

## 2018-05-29 RX ADMIN — POTASSIUM CHLORIDE 40 MEQ: 10 TABLET, EXTENDED RELEASE ORAL at 09:05

## 2018-05-29 RX ADMIN — FUROSEMIDE 80 MG: 10 INJECTION, SOLUTION INTRAMUSCULAR; INTRAVENOUS at 09:05

## 2018-05-29 NOTE — PLAN OF CARE
Problem: Patient Care Overview  Goal: Plan of Care Review  Outcome: Ongoing (interventions implemented as appropriate)  Arrived from home in stable condition.Denies any acute distress at this time.Reported he has started going back to the GYM x 2 weeks.Wt down 2.65 kg from 157.70 to 155.05 kg this visit.Orders given per S Bryan NP and carried out.IV started and labs sent.Lasix 80 mg ivp followed by lasix drip @ 20 cc/hr x 6 hours.Will continue to monitor

## 2018-05-29 NOTE — PLAN OF CARE
Problem: Cardiac: Heart Failure (Adult)  Goal: Signs and Symptoms of Listed Potential Problems Will be Absent, Minimized or Managed (Cardiac: Heart Failure)  Signs and symptoms of listed potential problems will be absent, minimized or managed by discharge/transition of care (reference Cardiac: Heart Failure (Adult) CPG).   Outcome: Ongoing (interventions implemented as appropriate)  ALEXANDRE Adams NP here,notified of am labs and no pm labs ordered.Supplemented Kcl 40 mEq po x 1 dose.Reinforced medication regimen.Encourage to continue exercising in moderatiion and low sodium diet with 1500 cc fluid restriction.Tolerated infusion well.Urine output total 2075 ml with net -1385 ml.Discharge home,vss,neuro intact.Denies any acute distress/concerns at this time.Left floor in stable condition.Return in 2 weeks.Huan

## 2018-05-30 NOTE — PROGRESS NOTES
Subjective:    Patient ID:  Yong Mcintyre is a 42 y.o. male who presents for follow-up of PH    Congestive Heart Failure   Associated symptoms include shortness of breath.     41 y/o AAM presents for biweekly outpatient IV diuretic tx. He remains on Lasix 80 mg bid, Tracleer and Revatio and is on home O2 at 3 LPM. Sees Dr. Head at [facility] once a year. Weight is stable today. Started up at the gym again.     Review of Systems   Constitution: Positive for weight gain.   Cardiovascular: Positive for dyspnea on exertion. Negative for leg swelling, orthopnea and paroxysmal nocturnal dyspnea.   Respiratory: Positive for shortness of breath.    Gastrointestinal: Negative for nausea and vomiting.   Neurological: Negative for dizziness and light-headedness.        Objective:    Physical Exam   Constitutional: He is oriented to person, place, and time. He appears well-developed and well-nourished.   /77 (BP Location: Right arm, Patient Position: Sitting)   Pulse 90   Temp 98.6 °F (37 °C) (Oral)   Resp 16   Wt (!) 154.5 kg (340 lb 8 oz)   SpO2 (!) 93%   BMI 56.66 kg/m²      Neck: JVD present.   Cardiovascular: Normal rate, regular rhythm and normal heart sounds.    Pulmonary/Chest: Effort normal and breath sounds normal.   Abdominal: Soft. Bowel sounds are normal. There is no tenderness.   Musculoskeletal: He exhibits no edema.   Neurological: He is alert and oriented to person, place, and time.   Skin: Skin is warm and dry.         Assessment:       1. Primary pulmonary hypertension    2. Long term current use of anticoagulant therapy    3. Chronic pulmonary heart disease         Plan:     Lasix 80 mg IVP, then 20 mg/hr X 6 hours.   Followed by Dr. Head at [facility]  Continue biweekly outpatient infusions.

## 2018-06-08 DIAGNOSIS — I27.0 PRIMARY PULMONARY HYPERTENSION: Primary | ICD-10-CM

## 2018-06-11 ENCOUNTER — INFUSION (OUTPATIENT)
Dept: CARDIOLOGY | Facility: HOSPITAL | Age: 43
End: 2018-06-11
Attending: INTERNAL MEDICINE
Payer: MEDICARE

## 2018-06-11 ENCOUNTER — ANTI-COAG VISIT (OUTPATIENT)
Dept: CARDIOLOGY | Facility: CLINIC | Age: 43
End: 2018-06-11

## 2018-06-11 VITALS
TEMPERATURE: 98 F | HEART RATE: 90 BPM | DIASTOLIC BLOOD PRESSURE: 71 MMHG | RESPIRATION RATE: 24 BRPM | WEIGHT: 315 LBS | BODY MASS INDEX: 55.8 KG/M2 | OXYGEN SATURATION: 97 % | SYSTOLIC BLOOD PRESSURE: 113 MMHG

## 2018-06-11 DIAGNOSIS — I27.0 PRIMARY PULMONARY HYPERTENSION: Primary | ICD-10-CM

## 2018-06-11 DIAGNOSIS — Z79.01 LONG TERM CURRENT USE OF ANTICOAGULANT THERAPY: ICD-10-CM

## 2018-06-11 LAB
ALBUMIN SERPL BCP-MCNC: 3.2 G/DL
ALP SERPL-CCNC: 111 U/L
ALT SERPL W/O P-5'-P-CCNC: 16 U/L
ANION GAP SERPL CALC-SCNC: 10 MMOL/L
AST SERPL-CCNC: 23 U/L
BILIRUB DIRECT SERPL-MCNC: 0.4 MG/DL
BILIRUB SERPL-MCNC: 0.9 MG/DL
BNP SERPL-MCNC: <10 PG/ML
BUN SERPL-MCNC: 15 MG/DL
CALCIUM SERPL-MCNC: 9.5 MG/DL
CHLORIDE SERPL-SCNC: 97 MMOL/L
CO2 SERPL-SCNC: 31 MMOL/L
CREAT SERPL-MCNC: 1.1 MG/DL
EST. GFR  (AFRICAN AMERICAN): >60 ML/MIN/1.73 M^2
EST. GFR  (NON AFRICAN AMERICAN): >60 ML/MIN/1.73 M^2
GLUCOSE SERPL-MCNC: 98 MG/DL
INR PPP: 2.8
MAGNESIUM SERPL-MCNC: 1.9 MG/DL
POTASSIUM SERPL-SCNC: 3.7 MMOL/L
PROT SERPL-MCNC: 8.3 G/DL
PROTHROMBIN TIME: 27.1 SEC
SODIUM SERPL-SCNC: 138 MMOL/L

## 2018-06-11 PROCEDURE — 80048 BASIC METABOLIC PNL TOTAL CA: CPT

## 2018-06-11 PROCEDURE — 99213 OFFICE O/P EST LOW 20 MIN: CPT | Mod: ,,, | Performed by: INTERNAL MEDICINE

## 2018-06-11 PROCEDURE — 96376 TX/PRO/DX INJ SAME DRUG ADON: CPT

## 2018-06-11 PROCEDURE — 85610 PROTHROMBIN TIME: CPT

## 2018-06-11 PROCEDURE — 25000003 PHARM REV CODE 250: Performed by: PHYSICIAN ASSISTANT

## 2018-06-11 PROCEDURE — 63600175 PHARM REV CODE 636 W HCPCS: Performed by: PHYSICIAN ASSISTANT

## 2018-06-11 PROCEDURE — 96366 THER/PROPH/DIAG IV INF ADDON: CPT

## 2018-06-11 PROCEDURE — 96365 THER/PROPH/DIAG IV INF INIT: CPT

## 2018-06-11 PROCEDURE — 99499 UNLISTED E&M SERVICE: CPT | Mod: ,,, | Performed by: PHYSICIAN ASSISTANT

## 2018-06-11 PROCEDURE — 83880 ASSAY OF NATRIURETIC PEPTIDE: CPT

## 2018-06-11 PROCEDURE — 83735 ASSAY OF MAGNESIUM: CPT

## 2018-06-11 PROCEDURE — 80076 HEPATIC FUNCTION PANEL: CPT

## 2018-06-11 RX ORDER — POTASSIUM CHLORIDE 750 MG/1
40 TABLET, EXTENDED RELEASE ORAL ONCE
Status: COMPLETED | OUTPATIENT
Start: 2018-06-11 | End: 2018-06-11

## 2018-06-11 RX ORDER — LANOLIN ALCOHOL/MO/W.PET/CERES
400 CREAM (GRAM) TOPICAL ONCE
Status: COMPLETED | OUTPATIENT
Start: 2018-06-11 | End: 2018-06-11

## 2018-06-11 RX ORDER — FUROSEMIDE 10 MG/ML
80 INJECTION INTRAMUSCULAR; INTRAVENOUS
Status: COMPLETED | OUTPATIENT
Start: 2018-06-11 | End: 2018-06-11

## 2018-06-11 RX ADMIN — FUROSEMIDE 80 MG: 10 INJECTION, SOLUTION INTRAMUSCULAR; INTRAVENOUS at 08:06

## 2018-06-11 RX ADMIN — POTASSIUM CHLORIDE 40 MEQ: 10 TABLET, EXTENDED RELEASE ORAL at 09:06

## 2018-06-11 RX ADMIN — Medication 400 MG: at 09:06

## 2018-06-11 RX ADMIN — FUROSEMIDE 20 MG/HR: 10 INJECTION, SOLUTION INTRAMUSCULAR; INTRAVENOUS at 08:06

## 2018-06-11 NOTE — PLAN OF CARE
Problem: Patient Care Overview  Goal: Plan of Care Review  Outcome: Ongoing (interventions implemented as appropriate)  Arrived from home in stable condition.Denies any acute distress at this time.Wt down 3.55 kg from 155.65 to 152.10 kg this visit.Orders given per BRIDGETT BROWN and carried out.PIV started with labs obtained and sent.Lasix 80 mg ivp followed by lasix drip @ 20 cc/hr x 6 hours.Will continue to monitor

## 2018-06-11 NOTE — PLAN OF CARE
Problem: Cardiac: Heart Failure (Adult)  Goal: Signs and Symptoms of Listed Potential Problems Will be Absent, Minimized or Managed (Cardiac: Heart Failure)  Signs and symptoms of listed potential problems will be absent, minimized or managed by discharge/transition of care (reference Cardiac: Heart Failure (Adult) CPG).   Outcome: Ongoing (interventions implemented as appropriate)  BRIDGETT BROWN here,notified of am labs and no pm labs ordered.Supplemented with kcl 40 mEq po and Mag ox 400 mg po x 1 dose.Pt has been going to the gym and also started a simple diet plan.Pt reporetd losing about 12 lbs 1 month and has cut back on po fluid intake.Reinforced medication regimen.Tolerated infusion well.Urine output total 1275 ml with net -735 ml.Discharge home,vss,neuro intact.Denies any acute distress/concerns at this time.Left floor ambulating  in stable condition..Return in  2weeks.Huan

## 2018-06-22 DIAGNOSIS — I27.0 PRIMARY PULMONARY HYPERTENSION: Primary | ICD-10-CM

## 2018-06-25 ENCOUNTER — INFUSION (OUTPATIENT)
Dept: CARDIOLOGY | Facility: HOSPITAL | Age: 43
End: 2018-06-25
Attending: INTERNAL MEDICINE
Payer: MEDICARE

## 2018-06-25 VITALS
SYSTOLIC BLOOD PRESSURE: 115 MMHG | HEART RATE: 82 BPM | OXYGEN SATURATION: 93 % | RESPIRATION RATE: 13 BRPM | TEMPERATURE: 98 F | DIASTOLIC BLOOD PRESSURE: 69 MMHG | BODY MASS INDEX: 55.25 KG/M2 | WEIGHT: 315 LBS

## 2018-06-25 DIAGNOSIS — I51.89 DIASTOLIC DYSFUNCTION: ICD-10-CM

## 2018-06-25 DIAGNOSIS — I27.0 PRIMARY PULMONARY HYPERTENSION: Primary | ICD-10-CM

## 2018-06-25 DIAGNOSIS — I27.20 PULMONARY HYPERTENSION: ICD-10-CM

## 2018-06-25 DIAGNOSIS — J96.22 ACUTE AND CHRONIC RESPIRATORY FAILURE WITH HYPERCAPNIA: ICD-10-CM

## 2018-06-25 DIAGNOSIS — I27.9 CHRONIC CARDIOPULMONARY DISEASE: ICD-10-CM

## 2018-06-25 DIAGNOSIS — I27.81 COR PULMONALE: ICD-10-CM

## 2018-06-25 LAB
ANION GAP SERPL CALC-SCNC: 9 MMOL/L
BNP SERPL-MCNC: <10 PG/ML
BUN SERPL-MCNC: 19 MG/DL
CALCIUM SERPL-MCNC: 9.4 MG/DL
CHLORIDE SERPL-SCNC: 97 MMOL/L
CO2 SERPL-SCNC: 34 MMOL/L
CREAT SERPL-MCNC: 1.1 MG/DL
EST. GFR  (AFRICAN AMERICAN): >60 ML/MIN/1.73 M^2
EST. GFR  (NON AFRICAN AMERICAN): >60 ML/MIN/1.73 M^2
GLUCOSE SERPL-MCNC: 102 MG/DL
MAGNESIUM SERPL-MCNC: 2 MG/DL
POTASSIUM SERPL-SCNC: 3.8 MMOL/L
SODIUM SERPL-SCNC: 140 MMOL/L

## 2018-06-25 PROCEDURE — 96366 THER/PROPH/DIAG IV INF ADDON: CPT

## 2018-06-25 PROCEDURE — 63600175 PHARM REV CODE 636 W HCPCS: Performed by: NURSE PRACTITIONER

## 2018-06-25 PROCEDURE — 63600175 PHARM REV CODE 636 W HCPCS: Performed by: PHYSICIAN ASSISTANT

## 2018-06-25 PROCEDURE — 83735 ASSAY OF MAGNESIUM: CPT

## 2018-06-25 PROCEDURE — 83880 ASSAY OF NATRIURETIC PEPTIDE: CPT

## 2018-06-25 PROCEDURE — 25000003 PHARM REV CODE 250: Performed by: PHYSICIAN ASSISTANT

## 2018-06-25 PROCEDURE — 99213 OFFICE O/P EST LOW 20 MIN: CPT | Mod: ,,, | Performed by: INTERNAL MEDICINE

## 2018-06-25 PROCEDURE — 80048 BASIC METABOLIC PNL TOTAL CA: CPT

## 2018-06-25 PROCEDURE — 99499 UNLISTED E&M SERVICE: CPT | Mod: HCNC,,, | Performed by: NURSE PRACTITIONER

## 2018-06-25 PROCEDURE — 96375 TX/PRO/DX INJ NEW DRUG ADDON: CPT

## 2018-06-25 PROCEDURE — 96365 THER/PROPH/DIAG IV INF INIT: CPT

## 2018-06-25 RX ORDER — METOPROLOL TARTRATE 25 MG/1
25 TABLET, FILM COATED ORAL 2 TIMES DAILY
Qty: 180 TABLET | Refills: 3 | Status: SHIPPED | OUTPATIENT
Start: 2018-06-25 | End: 2018-10-17 | Stop reason: SDUPTHER

## 2018-06-25 RX ORDER — FUROSEMIDE 10 MG/ML
80 INJECTION INTRAMUSCULAR; INTRAVENOUS
Status: COMPLETED | OUTPATIENT
Start: 2018-06-25 | End: 2018-06-25

## 2018-06-25 RX ADMIN — FUROSEMIDE 20 MG/HR: 10 INJECTION, SOLUTION INTRAVENOUS at 07:06

## 2018-06-25 RX ADMIN — FUROSEMIDE 80 MG: 10 INJECTION, SOLUTION INTRAMUSCULAR; INTRAVENOUS at 07:06

## 2018-06-25 NOTE — PROGRESS NOTES
Subjective:    Patient ID:  Yong Mcintyre is a 42 y.o. male who presents for follow-up of Congestive Heart Failure      Congestive Heart Failure      43 yo AAM presents for biweekly outpatient IV diuretic tx. He remains on Lasix 80 mg bid, Tracleer and Revatio and is on home O2 at 3 LPM. Has gained ~ 80# over the last year 2/2 dietary indiscretion and not going to the gym. Sees Dr. Head at [facility] once a year (saw last Wednesday).    Review of Systems   Constitution: Positive for malaise/fatigue.   HENT: Negative.    Eyes: Negative.    Cardiovascular: Positive for dyspnea on exertion and leg swelling.   Respiratory: Positive for shortness of breath and wheezing.    Endocrine: Negative.    Hematologic/Lymphatic: Negative.    Skin: Negative.    Musculoskeletal: Positive for arthritis and joint pain.   Gastrointestinal: Negative.    Genitourinary: Negative.    Neurological: Negative.    Psychiatric/Behavioral: Negative.    Allergic/Immunologic: Negative.         Objective:    Physical Exam   Constitutional: He is oriented to person, place, and time. He appears well-developed and well-nourished.   HENT:   Head: Normocephalic and atraumatic.   Eyes: Conjunctivae are normal. Pupils are equal, round, and reactive to light.   Neck: Normal range of motion. Neck supple. No thyromegaly present.   JVP mid neck   Cardiovascular: Normal rate, regular rhythm and normal heart sounds.    Pulmonary/Chest: Effort normal and breath sounds normal.   Abdominal: Soft. Bowel sounds are normal.   Musculoskeletal: He exhibits edema.   Neurological: He is alert and oriented to person, place, and time.   Skin: Skin is warm and dry.   Psychiatric: He has a normal mood and affect. His behavior is normal. Judgment and thought content normal.         Assessment:       1. Primary pulmonary hypertension    2. Diastolic dysfunction    3. Cor pulmonale    4. Chronic cardiopulmonary disease    5. Pulmonary hypertension    6. Acute and chronic  respiratory failure with hypercapnia         Plan:       Lasix 80 mg IVP, then 20 mg/hr X 6 hours.  Followed by Dr. Head at [facility] (saw last Wednesday.  Continue biweekly outpatient infusions.

## 2018-06-25 NOTE — PLAN OF CARE
Problem: Patient Care Overview  Goal: Plan of Care Review  Outcome: Ongoing (interventions implemented as appropriate)  Arrived from home in stable condition.Denies any acute distress at this time.Wt down 1.5 kg from 152.1 to 150.6 kg this visit.Orders given per GRIFFIN Rooney NP and carried out.IV started and labs sent.Lasix 80 mg ivp followed by lasix drip @ 20 cc/hr x 6 hours.Will continue to monitor

## 2018-06-25 NOTE — PLAN OF CARE
Problem: Cardiac: Heart Failure (Adult)  Goal: Signs and Symptoms of Listed Potential Problems Will be Absent, Minimized or Managed (Cardiac: Heart Failure)  Signs and symptoms of listed potential problems will be absent, minimized or managed by discharge/transition of care (reference Cardiac: Heart Failure (Adult) CPG).   Outcome: Ongoing (interventions implemented as appropriate)  GRIFFIN Rooney NP here,notified of am labs and no pm labs ordered.Pt took own kcl 99 mg(otc) here.He is continuing his exercise routine 4 x weekly. Reinforced to dre is heart rated and listen to his body while exercising.Take rest periods frequently/as neeeded.Tolerated infusion well.Urine output total 1925 ml with net -1410 ml .Discharge home,vss,neuro intact.Denies any acute distress/concerns at this time.Left floor ambulating in stable condition.Return in 2 weeks.Huan

## 2018-07-03 ENCOUNTER — TELEPHONE (OUTPATIENT)
Dept: INTERNAL MEDICINE | Facility: CLINIC | Age: 43
End: 2018-07-03

## 2018-07-03 NOTE — PROGRESS NOTES
Subjective:    Patient ID:  Yong Mcintyre is a 42 y.o. male who presents for follow-up of PH    Congestive Heart Failure   Associated symptoms include shortness of breath. Pertinent negatives include no chest pain.     43 y/o AAM presents for biweekly outpatient IV diuretic tx. He remains on Lasix 80 mg bid, Tracleer and Revatio and is on home O2 at 3 LPM. Sees Dr. Head at [facility] once a year. Weight is down today. No new complaints.     Review of Systems   Constitution: Positive for weight loss. Negative for weight gain.   Cardiovascular: Positive for dyspnea on exertion. Negative for chest pain, leg swelling, orthopnea and paroxysmal nocturnal dyspnea.   Respiratory: Positive for shortness of breath.    Gastrointestinal: Negative for nausea and vomiting.   Neurological: Negative for dizziness and light-headedness.        Objective:    Physical Exam   Constitutional: He is oriented to person, place, and time. He appears well-developed and well-nourished.   /77 (BP Location: Right arm, Patient Position: Sitting)   Pulse 90   Temp 98.6 °F (37 °C) (Oral)   Resp 16   Wt (!) 154.5 kg (340 lb 8 oz)   SpO2 (!) 93%   BMI 56.66 kg/m²      Neck: JVD present.   Cardiovascular: Normal rate, regular rhythm and normal heart sounds.    Pulmonary/Chest: Effort normal and breath sounds normal.   Abdominal: Soft. Bowel sounds are normal. There is no tenderness.   Musculoskeletal: He exhibits no edema.   Neurological: He is alert and oriented to person, place, and time.   Skin: Skin is warm and dry.         Assessment:       1. Primary pulmonary hypertension    2. Long term current use of anticoagulant therapy         Plan:     Lasix 80 mg IVP, then 20 mg/hr X 6 hours.   Followed by Dr. Head at [facility]  Continue biweekly outpatient infusions.

## 2018-07-03 NOTE — TELEPHONE ENCOUNTER
----- Message from Rolo Guzman sent at 7/3/2018  2:08 PM CDT -----  Contact: PT   Pt would like a call back want to have labs drawn to check his vitamin D levels  Insisted on message being sent     Pt can be reached at [phone]

## 2018-07-03 NOTE — TELEPHONE ENCOUNTER
Call patient     does he want to come in Thursday morning for me to check him since is been a while and I can order labs

## 2018-07-05 ENCOUNTER — LAB VISIT (OUTPATIENT)
Dept: LAB | Facility: HOSPITAL | Age: 43
End: 2018-07-05
Attending: INTERNAL MEDICINE
Payer: MEDICARE

## 2018-07-05 ENCOUNTER — OFFICE VISIT (OUTPATIENT)
Dept: INTERNAL MEDICINE | Facility: CLINIC | Age: 43
End: 2018-07-05
Payer: MEDICARE

## 2018-07-05 VITALS
HEART RATE: 66 BPM | SYSTOLIC BLOOD PRESSURE: 112 MMHG | HEIGHT: 65 IN | WEIGHT: 315 LBS | DIASTOLIC BLOOD PRESSURE: 76 MMHG | BODY MASS INDEX: 52.48 KG/M2

## 2018-07-05 DIAGNOSIS — J96.11 CHRONIC RESPIRATORY FAILURE WITH HYPOXIA AND HYPERCAPNIA: ICD-10-CM

## 2018-07-05 DIAGNOSIS — G47.00 INSOMNIA, UNSPECIFIED TYPE: ICD-10-CM

## 2018-07-05 DIAGNOSIS — M10.9 GOUT, UNSPECIFIED CAUSE, UNSPECIFIED CHRONICITY, UNSPECIFIED SITE: ICD-10-CM

## 2018-07-05 DIAGNOSIS — I50.42 CHRONIC COMBINED SYSTOLIC AND DIASTOLIC HEART FAILURE: ICD-10-CM

## 2018-07-05 DIAGNOSIS — I27.20 PULMONARY HYPERTENSION: ICD-10-CM

## 2018-07-05 DIAGNOSIS — J96.12 CHRONIC RESPIRATORY FAILURE WITH HYPOXIA AND HYPERCAPNIA: ICD-10-CM

## 2018-07-05 DIAGNOSIS — R79.89 LOW VITAMIN D LEVEL: Primary | ICD-10-CM

## 2018-07-05 DIAGNOSIS — G47.33 OSA (OBSTRUCTIVE SLEEP APNEA): ICD-10-CM

## 2018-07-05 DIAGNOSIS — R79.89 LOW VITAMIN D LEVEL: ICD-10-CM

## 2018-07-05 LAB
25(OH)D3+25(OH)D2 SERPL-MCNC: 38 NG/ML
BASOPHILS # BLD AUTO: 0.05 K/UL
BASOPHILS NFR BLD: 0.8 %
DIFFERENTIAL METHOD: ABNORMAL
EOSINOPHIL # BLD AUTO: 0.2 K/UL
EOSINOPHIL NFR BLD: 3.7 %
ERYTHROCYTE [DISTWIDTH] IN BLOOD BY AUTOMATED COUNT: 14.1 %
HCT VFR BLD AUTO: 53.8 %
HGB BLD-MCNC: 16.4 G/DL
IMM GRANULOCYTES # BLD AUTO: 0.01 K/UL
IMM GRANULOCYTES NFR BLD AUTO: 0.2 %
LYMPHOCYTES # BLD AUTO: 1.6 K/UL
LYMPHOCYTES NFR BLD: 24.3 %
MCH RBC QN AUTO: 29.1 PG
MCHC RBC AUTO-ENTMCNC: 30.5 G/DL
MCV RBC AUTO: 95 FL
MONOCYTES # BLD AUTO: 0.6 K/UL
MONOCYTES NFR BLD: 9 %
NEUTROPHILS # BLD AUTO: 4.1 K/UL
NEUTROPHILS NFR BLD: 62 %
NRBC BLD-RTO: 0 /100 WBC
PLATELET # BLD AUTO: 228 K/UL
PMV BLD AUTO: 10.3 FL
RBC # BLD AUTO: 5.64 M/UL
TSH SERPL DL<=0.005 MIU/L-ACNC: 1.96 UIU/ML
URATE SERPL-MCNC: 6.1 MG/DL
WBC # BLD AUTO: 6.55 K/UL

## 2018-07-05 PROCEDURE — 82306 VITAMIN D 25 HYDROXY: CPT

## 2018-07-05 PROCEDURE — 99999 PR PBB SHADOW E&M-EST. PATIENT-LVL IV: CPT | Mod: PBBFAC,,, | Performed by: INTERNAL MEDICINE

## 2018-07-05 PROCEDURE — 85025 COMPLETE CBC W/AUTO DIFF WBC: CPT

## 2018-07-05 PROCEDURE — 36415 COLL VENOUS BLD VENIPUNCTURE: CPT | Mod: PO

## 2018-07-05 PROCEDURE — 3008F BODY MASS INDEX DOCD: CPT | Mod: CPTII,S$GLB,, | Performed by: INTERNAL MEDICINE

## 2018-07-05 PROCEDURE — 84550 ASSAY OF BLOOD/URIC ACID: CPT

## 2018-07-05 PROCEDURE — 3074F SYST BP LT 130 MM HG: CPT | Mod: CPTII,S$GLB,, | Performed by: INTERNAL MEDICINE

## 2018-07-05 PROCEDURE — 84443 ASSAY THYROID STIM HORMONE: CPT

## 2018-07-05 PROCEDURE — 99214 OFFICE O/P EST MOD 30 MIN: CPT | Mod: S$GLB,,, | Performed by: INTERNAL MEDICINE

## 2018-07-05 PROCEDURE — 3078F DIAST BP <80 MM HG: CPT | Mod: CPTII,S$GLB,, | Performed by: INTERNAL MEDICINE

## 2018-07-05 NOTE — PROGRESS NOTES
REASON FOR VISIT:  This is a 42-year-old male who made this appointment in order   to get a vitamin D check.  What prompted this is that he will have trouble   going to sleep.  His sleep schedule is somewhat different.  He goes to bed   around 2:00 a.m. and if he does not take something like ZzzQuil, he would   probably go to sleep around 4:00, but normally he gets 7-8 hours of sleep.  He   does wear oxygen and CPAP during the night and at one time, he was told of being   deficient in vitamin D.    PAST MEDICAL HISTORY:  Pulmonary hypertension.  Hypoventilation syndrome associated with chronic respiratory failure of   hypercapnia and hypoxemia.  He is on 2 liters O2.  Left ventricular systolic and diastolic heart failure.  Obstructive sleep apnea with the use of CPAP.  Gout.  Patent foramen ovale.  Cholecystectomy.    SOCIAL HISTORY:  Tobacco use and alcohol use - none.  He does go to the gym five   days a week where he does a cardiovascular routine.    MEDICATIONS:  Advair Diskus 250/50 one puff twice a day as needed.  Allopurinol 300 mg daily.  Tracleer 125 mg twice a day.  Lasix 20 mg two tablets twice a day.  Magnesium.  Metoprolol 25 mg one twice a day.  Coumadin.  Revatio 20 mg three times a day.  Over-the-counter potassium three a day and every two weeks, comes in for Lasix   infusions.    REVIEW OF SYMPTOMS:  Endorses no pains in the chest.  He will have some dyspnea   on exertion.  No abdominal pain.  Regular bowel function.  No difficulty   urination.    PHYSICAL EXAMINATION:  VITAL SIGNS:  His weight is 230 pounds, pulse 68, blood pressure reading 112/76.  NECK:  No thyromegaly.  LUNGS:  Clear.  HEART:  Regular rate and rhythm.  ABDOMEN:  Active bowel sounds, soft, nontender.  No hepatosplenomegaly or   abdominal masses.  PULSES:  2+ carotid pulses, 2+ pedal pulses.  EXTREMITIES:  Trace to 1+ edema.  SKIN:  Hyperpigmented changes.    IMPRESSION:  1.  Low vitamin D.  2.  Insomnia.  3.  Pulmonary  hypertension.  4.  Chronic respiratory failure.  5.  Obstructive sleep apnea.  6.  Chronic left ventricular combined heart failure.    PLAN:  Today we will get a vitamin D.  We will also be checking a uric acid,   CBC, and TSH.  These are labs that have not really been checked in a while.  He   gets regular testing with his chemistry, hepatic function panel, and magnesium.      JAM/HN  dd: 07/05/2018 10:40:51 (CDT)  td: 07/06/2018 04:25:01 (CDT)  Doc ID   #[id number]  Job ID #[id number]    CC:

## 2018-07-06 DIAGNOSIS — I27.0 PRIMARY PULMONARY HYPERTENSION: Primary | ICD-10-CM

## 2018-07-09 ENCOUNTER — INFUSION (OUTPATIENT)
Dept: CARDIOLOGY | Facility: HOSPITAL | Age: 43
End: 2018-07-09
Attending: INTERNAL MEDICINE
Payer: MEDICARE

## 2018-07-09 ENCOUNTER — ANTI-COAG VISIT (OUTPATIENT)
Dept: CARDIOLOGY | Facility: CLINIC | Age: 43
End: 2018-07-09

## 2018-07-09 VITALS
WEIGHT: 315 LBS | BODY MASS INDEX: 54.48 KG/M2 | RESPIRATION RATE: 28 BRPM | SYSTOLIC BLOOD PRESSURE: 121 MMHG | HEART RATE: 86 BPM | TEMPERATURE: 98 F | OXYGEN SATURATION: 95 % | DIASTOLIC BLOOD PRESSURE: 81 MMHG

## 2018-07-09 DIAGNOSIS — Z79.01 LONG TERM CURRENT USE OF ANTICOAGULANT THERAPY: ICD-10-CM

## 2018-07-09 DIAGNOSIS — I27.9 CHRONIC PULMONARY HEART DISEASE: ICD-10-CM

## 2018-07-09 DIAGNOSIS — I27.0 PRIMARY PULMONARY HYPERTENSION: Primary | ICD-10-CM

## 2018-07-09 LAB
ALBUMIN SERPL BCP-MCNC: 3.3 G/DL
ALP SERPL-CCNC: 110 U/L
ALT SERPL W/O P-5'-P-CCNC: 16 U/L
ANION GAP SERPL CALC-SCNC: 9 MMOL/L
AST SERPL-CCNC: 23 U/L
BILIRUB DIRECT SERPL-MCNC: 0.4 MG/DL
BILIRUB SERPL-MCNC: 1.2 MG/DL
BNP SERPL-MCNC: <10 PG/ML
BUN SERPL-MCNC: 20 MG/DL
CALCIUM SERPL-MCNC: 9.5 MG/DL
CHLORIDE SERPL-SCNC: 98 MMOL/L
CO2 SERPL-SCNC: 31 MMOL/L
CREAT SERPL-MCNC: 1.1 MG/DL
EST. GFR  (AFRICAN AMERICAN): >60 ML/MIN/1.73 M^2
EST. GFR  (NON AFRICAN AMERICAN): >60 ML/MIN/1.73 M^2
GLUCOSE SERPL-MCNC: 100 MG/DL
INR PPP: 2.2
MAGNESIUM SERPL-MCNC: 2 MG/DL
POTASSIUM SERPL-SCNC: 3.7 MMOL/L
PROT SERPL-MCNC: 8.6 G/DL
PROTHROMBIN TIME: 21.3 SEC
SODIUM SERPL-SCNC: 138 MMOL/L

## 2018-07-09 PROCEDURE — 99213 OFFICE O/P EST LOW 20 MIN: CPT | Mod: ,,, | Performed by: INTERNAL MEDICINE

## 2018-07-09 PROCEDURE — 80076 HEPATIC FUNCTION PANEL: CPT

## 2018-07-09 PROCEDURE — 96365 THER/PROPH/DIAG IV INF INIT: CPT

## 2018-07-09 PROCEDURE — 36415 COLL VENOUS BLD VENIPUNCTURE: CPT

## 2018-07-09 PROCEDURE — 85610 PROTHROMBIN TIME: CPT

## 2018-07-09 PROCEDURE — 96376 TX/PRO/DX INJ SAME DRUG ADON: CPT

## 2018-07-09 PROCEDURE — 96366 THER/PROPH/DIAG IV INF ADDON: CPT

## 2018-07-09 PROCEDURE — 63600175 PHARM REV CODE 636 W HCPCS: Performed by: PHYSICIAN ASSISTANT

## 2018-07-09 PROCEDURE — 25000003 PHARM REV CODE 250: Performed by: NURSE PRACTITIONER

## 2018-07-09 PROCEDURE — 25000003 PHARM REV CODE 250: Performed by: PHYSICIAN ASSISTANT

## 2018-07-09 PROCEDURE — 63600175 PHARM REV CODE 636 W HCPCS: Performed by: NURSE PRACTITIONER

## 2018-07-09 PROCEDURE — 83735 ASSAY OF MAGNESIUM: CPT

## 2018-07-09 PROCEDURE — 83880 ASSAY OF NATRIURETIC PEPTIDE: CPT

## 2018-07-09 PROCEDURE — 80048 BASIC METABOLIC PNL TOTAL CA: CPT

## 2018-07-09 RX ORDER — POTASSIUM CHLORIDE 750 MG/1
30 TABLET, EXTENDED RELEASE ORAL
Status: COMPLETED | OUTPATIENT
Start: 2018-07-09 | End: 2018-07-09

## 2018-07-09 RX ORDER — FUROSEMIDE 10 MG/ML
80 INJECTION INTRAMUSCULAR; INTRAVENOUS
Status: COMPLETED | OUTPATIENT
Start: 2018-07-09 | End: 2018-07-09

## 2018-07-09 RX ADMIN — FUROSEMIDE 80 MG: 10 INJECTION, SOLUTION INTRAMUSCULAR; INTRAVENOUS at 08:07

## 2018-07-09 RX ADMIN — FUROSEMIDE 20 MG/HR: 10 INJECTION, SOLUTION INTRAMUSCULAR; INTRAVENOUS at 08:07

## 2018-07-09 RX ADMIN — POTASSIUM CHLORIDE 30 MEQ: 750 TABLET, EXTENDED RELEASE ORAL at 12:07

## 2018-07-09 NOTE — PLAN OF CARE
Problem: Cardiac: Heart Failure (Adult)  Goal: Signs and Symptoms of Listed Potential Problems Will be Absent, Minimized or Managed (Cardiac: Heart Failure)  Signs and symptoms of listed potential problems will be absent, minimized or managed by discharge/transition of care (reference Cardiac: Heart Failure (Adult) CPG).   Outcome: Ongoing (interventions implemented as appropriate)  ALEXANDRE BROWN here,notified of am labs and no pm labs ordered.Supplemented with Kcl 30 mEq po x 1 dose.Pt reported he has been dieting and going to the gym 3-4 x weekly.Reinforced medication regimen.Tolerated infusion well.Urine output total 1025 with net -525 ml.Discharge home,vss,neuro intact.Denies any acute distress/concerns at this time.Left floor in stable condition.Return in 2 weeks.Huan

## 2018-07-09 NOTE — PLAN OF CARE
Problem: Patient Care Overview  Goal: Plan of Care Review  Outcome: Ongoing (interventions implemented as appropriate)  Arrived from home in stable condition.Denies any acute distress at this time.Wt down 2.1 kg from 150.6 to 148.5 kg this visit.Orders given per S Davin PA and carried out.IV started and labs sent.Lasix 80 mg ivp followed by lasix drip @ 20 cc/hr x 6 hours.Will continue to monitor

## 2018-07-10 NOTE — PROGRESS NOTES
Subjective:    Patient ID:  Yong Mcintyre is a 42 y.o. male who presents for follow-up of No chief complaint on file.      Congestive Heart Failure   Associated symptoms include shortness of breath.    43 yo AAM presents for biweekly outpatient IV diuretic tx. He remains on Lasix 80 mg bid, Tracleer and Revatio and is on home O2 at 3 LPM. Has gained ~ 80# over the last year 2/2 dietary indiscretion and not going to the gym. Sees Dr. Head at [facility] once a year.  Weight is stable this visit    Review of Systems   Constitution: Positive for malaise/fatigue.   HENT: Negative.    Eyes: Negative.    Cardiovascular: Positive for dyspnea on exertion and leg swelling.   Respiratory: Positive for shortness of breath and wheezing.    Endocrine: Negative.    Hematologic/Lymphatic: Negative.    Skin: Negative.    Musculoskeletal: Positive for arthritis and joint pain.   Gastrointestinal: Negative.    Genitourinary: Negative.    Neurological: Negative.    Psychiatric/Behavioral: Negative.    Allergic/Immunologic: Negative.         Objective:    Physical Exam   Constitutional: He is oriented to person, place, and time. He appears well-developed and well-nourished.   HENT:   Head: Normocephalic and atraumatic.   Eyes: Conjunctivae are normal. Pupils are equal, round, and reactive to light.   Neck: Normal range of motion. Neck supple. No thyromegaly present.   JVP mid neck   Cardiovascular: Normal rate, regular rhythm and normal heart sounds.    Pulmonary/Chest: Effort normal and breath sounds normal.   Abdominal: Soft. Bowel sounds are normal.   Musculoskeletal: He exhibits edema.   Neurological: He is alert and oriented to person, place, and time.   Skin: Skin is warm and dry.   Psychiatric: He has a normal mood and affect. His behavior is normal. Judgment and thought content normal.         Assessment:       1. Primary pulmonary hypertension    2. Long term current use of anticoagulant therapy    3. Chronic pulmonary heart  disease         Plan:       Lasix 80 mg IVP, then 20 mg/hr X 6 hours.  Followed by Dr. Head at [facility].  Continue biweekly outpatient infusions.

## 2018-07-20 DIAGNOSIS — I27.0 PRIMARY PULMONARY HYPERTENSION: Primary | ICD-10-CM

## 2018-07-23 ENCOUNTER — INFUSION (OUTPATIENT)
Dept: CARDIOLOGY | Facility: HOSPITAL | Age: 43
End: 2018-07-23
Attending: INTERNAL MEDICINE
Payer: MEDICARE

## 2018-07-23 VITALS
OXYGEN SATURATION: 96 % | TEMPERATURE: 98 F | BODY MASS INDEX: 52.48 KG/M2 | RESPIRATION RATE: 24 BRPM | HEIGHT: 65 IN | HEART RATE: 80 BPM | DIASTOLIC BLOOD PRESSURE: 74 MMHG | SYSTOLIC BLOOD PRESSURE: 107 MMHG | WEIGHT: 315 LBS

## 2018-07-23 DIAGNOSIS — I27.0 PRIMARY PULMONARY HYPERTENSION: ICD-10-CM

## 2018-07-23 LAB
ANION GAP SERPL CALC-SCNC: 9 MMOL/L
BNP SERPL-MCNC: 21 PG/ML
BUN SERPL-MCNC: 13 MG/DL
CALCIUM SERPL-MCNC: 9.3 MG/DL
CHLORIDE SERPL-SCNC: 97 MMOL/L
CO2 SERPL-SCNC: 32 MMOL/L
CREAT SERPL-MCNC: 1 MG/DL
EST. GFR  (AFRICAN AMERICAN): >60 ML/MIN/1.73 M^2
EST. GFR  (NON AFRICAN AMERICAN): >60 ML/MIN/1.73 M^2
GLUCOSE SERPL-MCNC: 101 MG/DL
MAGNESIUM SERPL-MCNC: 2.1 MG/DL
POTASSIUM SERPL-SCNC: 3.8 MMOL/L
SODIUM SERPL-SCNC: 138 MMOL/L

## 2018-07-23 PROCEDURE — 25000003 PHARM REV CODE 250: Performed by: PHYSICIAN ASSISTANT

## 2018-07-23 PROCEDURE — 83735 ASSAY OF MAGNESIUM: CPT

## 2018-07-23 PROCEDURE — 96376 TX/PRO/DX INJ SAME DRUG ADON: CPT

## 2018-07-23 PROCEDURE — 63600175 PHARM REV CODE 636 W HCPCS: Performed by: PHYSICIAN ASSISTANT

## 2018-07-23 PROCEDURE — 96366 THER/PROPH/DIAG IV INF ADDON: CPT

## 2018-07-23 PROCEDURE — 80048 BASIC METABOLIC PNL TOTAL CA: CPT

## 2018-07-23 PROCEDURE — 99213 OFFICE O/P EST LOW 20 MIN: CPT | Mod: ,,, | Performed by: INTERNAL MEDICINE

## 2018-07-23 PROCEDURE — 96365 THER/PROPH/DIAG IV INF INIT: CPT

## 2018-07-23 PROCEDURE — 83880 ASSAY OF NATRIURETIC PEPTIDE: CPT

## 2018-07-23 RX ORDER — FUROSEMIDE 10 MG/ML
80 INJECTION INTRAMUSCULAR; INTRAVENOUS
Status: COMPLETED | OUTPATIENT
Start: 2018-07-23 | End: 2018-07-23

## 2018-07-23 RX ADMIN — FUROSEMIDE 20 MG/HR: 10 INJECTION, SOLUTION INTRAVENOUS at 07:07

## 2018-07-23 RX ADMIN — FUROSEMIDE 80 MG: 10 INJECTION, SOLUTION INTRAMUSCULAR; INTRAVENOUS at 09:07

## 2018-07-23 NOTE — PLAN OF CARE
Problem: Cardiac: Heart Failure (Adult)  Goal: Signs and Symptoms of Listed Potential Problems Will be Absent, Minimized or Managed (Cardiac: Heart Failure)  Signs and symptoms of listed potential problems will be absent, minimized or managed by discharge/transition of care (reference Cardiac: Heart Failure (Adult) CPG).   Outcome: Ongoing (interventions implemented as appropriate)  ALEXANDRE BROWN here,notified of am labs and no pm labs ordered.Pt reported he's been out of Revatio for about 3 weeks.The company he gets it from had trouble on their end sending it out.Called back after infusion and Revatio has been believed today.Reinforced medication regimen,low sodium diet 1500 cc fluid restriction.Tolerated infusion well.Urine output total 1750 ml with net -1210 ml.Discharge home,vss,neuro intact.Denies any acute distress/concerns at this time.Left floor in stable condition .Return in 2 weeks.Huan

## 2018-07-23 NOTE — PLAN OF CARE
Problem: Patient Care Overview  Goal: Plan of Care Review  Outcome: Ongoing (interventions implemented as appropriate)  Arrived from home in stable condition .Denies any acute distress at this time.Wt 0.2 kg from 148.5 to 148.3 kg this visit.Orders given per S Owen BROWN and carried out.IV started and labs sent.Lasix 80 mg ivp followed by lasix drip @20 cc/hr x 6 hours.Will continue to monitor

## 2018-07-23 NOTE — PROGRESS NOTES
Subjective:    Patient ID:  Yong Mcintyre is a 42 y.o. male who presents for follow-up of No chief complaint on file.      Congestive Heart Failure      41 yo AAM presents for biweekly outpatient IV diuretic tx. He remains on Lasix 80 mg bid, Tracleer and Revatio and is on home O2 at 3 LPM. Has gained ~ 80# over the last year 2/2 dietary indiscretion and not going to the gym. Sees Dr. Head at [facility] once a year.  Weight is stable this visit.    Review of Systems   Constitution: Positive for malaise/fatigue.   HENT: Negative.    Eyes: Negative.    Cardiovascular: Positive for dyspnea on exertion and leg swelling.   Respiratory: Positive for shortness of breath and wheezing.    Endocrine: Negative.    Hematologic/Lymphatic: Negative.    Skin: Negative.    Musculoskeletal: Positive for arthritis and joint pain.   Gastrointestinal: Negative.    Genitourinary: Negative.    Neurological: Negative.    Psychiatric/Behavioral: Negative.    Allergic/Immunologic: Negative.         Objective:    Physical Exam   Constitutional: He is oriented to person, place, and time. He appears well-developed and well-nourished.   HENT:   Head: Normocephalic and atraumatic.   Eyes: Conjunctivae are normal. Pupils are equal, round, and reactive to light.   Neck: Normal range of motion. Neck supple. No thyromegaly present.   JVP mid neck   Cardiovascular: Normal rate, regular rhythm and normal heart sounds.    Pulmonary/Chest: Effort normal and breath sounds normal.   Abdominal: Soft. Bowel sounds are normal.   Musculoskeletal: He exhibits edema.   Neurological: He is alert and oriented to person, place, and time.   Skin: Skin is warm and dry.   Psychiatric: He has a normal mood and affect. His behavior is normal. Judgment and thought content normal.         Assessment:       1. Primary pulmonary hypertension         Plan:       Lasix 80 mg IVP, then 20 mg/hr X 6 hours.  Followed by Dr. Head at [facility].  Continue biweekly outpatient  infusions.

## 2018-08-03 DIAGNOSIS — I27.0 PRIMARY PULMONARY HYPERTENSION: Primary | ICD-10-CM

## 2018-08-06 ENCOUNTER — INFUSION (OUTPATIENT)
Dept: CARDIOLOGY | Facility: HOSPITAL | Age: 43
End: 2018-08-06
Attending: INTERNAL MEDICINE
Payer: MEDICARE

## 2018-08-06 ENCOUNTER — ANTI-COAG VISIT (OUTPATIENT)
Dept: CARDIOLOGY | Facility: CLINIC | Age: 43
End: 2018-08-06

## 2018-08-06 VITALS
HEART RATE: 86 BPM | RESPIRATION RATE: 26 BRPM | BODY MASS INDEX: 55.86 KG/M2 | OXYGEN SATURATION: 96 % | SYSTOLIC BLOOD PRESSURE: 113 MMHG | TEMPERATURE: 99 F | DIASTOLIC BLOOD PRESSURE: 65 MMHG | WEIGHT: 315 LBS

## 2018-08-06 DIAGNOSIS — I27.9 CHRONIC PULMONARY HEART DISEASE: ICD-10-CM

## 2018-08-06 DIAGNOSIS — Z79.01 LONG TERM CURRENT USE OF ANTICOAGULANT THERAPY: Primary | ICD-10-CM

## 2018-08-06 DIAGNOSIS — I27.0 PRIMARY PULMONARY HYPERTENSION: Primary | ICD-10-CM

## 2018-08-06 DIAGNOSIS — Z79.01 LONG TERM CURRENT USE OF ANTICOAGULANT THERAPY: ICD-10-CM

## 2018-08-06 LAB
ALBUMIN SERPL BCP-MCNC: 2.8 G/DL
ALP SERPL-CCNC: 131 U/L
ALT SERPL W/O P-5'-P-CCNC: 25 U/L
ANION GAP SERPL CALC-SCNC: 8 MMOL/L
AST SERPL-CCNC: 28 U/L
BILIRUB DIRECT SERPL-MCNC: 0.3 MG/DL
BILIRUB SERPL-MCNC: 0.8 MG/DL
BNP SERPL-MCNC: <10 PG/ML
BUN SERPL-MCNC: 17 MG/DL
CALCIUM SERPL-MCNC: 8.8 MG/DL
CHLORIDE SERPL-SCNC: 95 MMOL/L
CO2 SERPL-SCNC: 33 MMOL/L
CREAT SERPL-MCNC: 1 MG/DL
EST. GFR  (AFRICAN AMERICAN): >60 ML/MIN/1.73 M^2
EST. GFR  (NON AFRICAN AMERICAN): >60 ML/MIN/1.73 M^2
GLUCOSE SERPL-MCNC: 98 MG/DL
INR PPP: 1.5
MAGNESIUM SERPL-MCNC: 1.7 MG/DL
POTASSIUM SERPL-SCNC: 3.7 MMOL/L
PROT SERPL-MCNC: 7.8 G/DL
PROTHROMBIN TIME: 15 SEC
SODIUM SERPL-SCNC: 136 MMOL/L

## 2018-08-06 PROCEDURE — 25000003 PHARM REV CODE 250: Performed by: PHYSICIAN ASSISTANT

## 2018-08-06 PROCEDURE — 63600175 PHARM REV CODE 636 W HCPCS: Performed by: PHYSICIAN ASSISTANT

## 2018-08-06 PROCEDURE — 85610 PROTHROMBIN TIME: CPT

## 2018-08-06 PROCEDURE — 96376 TX/PRO/DX INJ SAME DRUG ADON: CPT

## 2018-08-06 PROCEDURE — 83880 ASSAY OF NATRIURETIC PEPTIDE: CPT

## 2018-08-06 PROCEDURE — 83735 ASSAY OF MAGNESIUM: CPT

## 2018-08-06 PROCEDURE — 36415 COLL VENOUS BLD VENIPUNCTURE: CPT

## 2018-08-06 PROCEDURE — 96366 THER/PROPH/DIAG IV INF ADDON: CPT

## 2018-08-06 PROCEDURE — 80076 HEPATIC FUNCTION PANEL: CPT

## 2018-08-06 PROCEDURE — 99213 OFFICE O/P EST LOW 20 MIN: CPT | Mod: ,,, | Performed by: INTERNAL MEDICINE

## 2018-08-06 PROCEDURE — 80048 BASIC METABOLIC PNL TOTAL CA: CPT

## 2018-08-06 PROCEDURE — 96365 THER/PROPH/DIAG IV INF INIT: CPT

## 2018-08-06 RX ORDER — FUROSEMIDE 10 MG/ML
80 INJECTION INTRAMUSCULAR; INTRAVENOUS
Status: COMPLETED | OUTPATIENT
Start: 2018-08-06 | End: 2018-08-06

## 2018-08-06 RX ADMIN — FUROSEMIDE 80 MG: 10 INJECTION, SOLUTION INTRAMUSCULAR; INTRAVENOUS at 07:08

## 2018-08-06 RX ADMIN — FUROSEMIDE 20 MG/HR: 10 INJECTION, SOLUTION INTRAVENOUS at 08:08

## 2018-08-06 NOTE — PROGRESS NOTES
Subjective:    Patient ID:  Yong Mcintyre is a 42 y.o. male who presents for follow-up of No chief complaint on file.      Congestive Heart Failure      41 yo AAM presents for biweekly outpatient IV diuretic tx. He remains on Lasix 80 mg bid, Tracleer and Revatio and is on home O2 at 3 LPM. Has gained ~ 80# over the last year 2/2 dietary indiscretion and not going to the gym. Sees Dr. Head at [facility] once a year.  Weight is elevated this visit: up 3.95kg from last visit.  He endorses non compliance with diet.  We discussed giving Metolazone today, but he refuses it.     Review of Systems   Constitution: Positive for malaise/fatigue.   HENT: Negative.    Eyes: Negative.    Cardiovascular: Positive for dyspnea on exertion and leg swelling.   Respiratory: Positive for shortness of breath and wheezing.    Endocrine: Negative.    Hematologic/Lymphatic: Negative.    Skin: Negative.    Musculoskeletal: Positive for arthritis and joint pain.   Gastrointestinal: Negative.    Genitourinary: Negative.    Neurological: Negative.    Psychiatric/Behavioral: Negative.    Allergic/Immunologic: Negative.         Objective:    Physical Exam   Constitutional: He is oriented to person, place, and time. He appears well-developed and well-nourished.   HENT:   Head: Normocephalic and atraumatic.   Eyes: Conjunctivae are normal. Pupils are equal, round, and reactive to light.   Neck: Normal range of motion. Neck supple. No thyromegaly present.   JVP mid neck   Cardiovascular: Normal rate, regular rhythm and normal heart sounds.    Pulmonary/Chest: Effort normal and breath sounds normal.   Abdominal: Soft. Bowel sounds are normal.   Musculoskeletal: He exhibits edema.   Neurological: He is alert and oriented to person, place, and time.   Skin: Skin is warm and dry.   Psychiatric: He has a normal mood and affect. His behavior is normal. Judgment and thought content normal.         Assessment:       1. Primary pulmonary hypertension     2. Long term current use of anticoagulant therapy    3. Chronic pulmonary heart disease         Plan:       Lasix 80 mg IVP, then 20 mg/hr X 6 hours.  Followed by Dr. Head at [facility].  Continue biweekly outpatient infusions.

## 2018-08-06 NOTE — PLAN OF CARE
Problem: Cardiac: Heart Failure (Adult)  Goal: Signs and Symptoms of Listed Potential Problems Will be Absent, Minimized or Managed (Cardiac: Heart Failure)  Signs and symptoms of listed potential problems will be absent, minimized or managed by discharge/transition of care (reference Cardiac: Heart Failure (Adult) CPG).   Outcome: Ongoing (interventions implemented as appropriate)  ALEXANDRE BROWN here,notified of am labs and no pm labs ordered.Pt took own kcl 20 mEq and mag ox 400 mg here.Coumadin clinic spoke with pt regarding med regimen.Pt reported for 1 week he was non compliant with his diet.Reinforced the importance of following low sodium diet and 1500 cc fluid restriction.Tolerated infusion well.Urine output total 2100 ml with net -1610 ml ml.Discharge home,vss,neuro intact.Denies any acute distress/concerns at this time.Left floor in stable condition.Return in 1 week.Huan

## 2018-08-06 NOTE — PLAN OF CARE
Problem: Patient Care Overview  Goal: Plan of Care Review  Outcome: Ongoing (interventions implemented as appropriate)  Arrived from home in stable condition.Denies any acute distress at this time.Wt up 3.95 kg from 148.3 to 152.25 kg this visit.Orders given per S Davin PA and carried out.IV started and labs sent.Lasix 80 mg ivp followed by lasix drip @ 20 cc/hr x 6 hours.Will continue to monitor

## 2018-08-15 DIAGNOSIS — I48.0 PAROXYSMAL ATRIAL FIBRILLATION: ICD-10-CM

## 2018-08-15 DIAGNOSIS — I27.0 PRIMARY PULMONARY HYPERTENSION: ICD-10-CM

## 2018-08-15 DIAGNOSIS — I27.9 CHRONIC PULMONARY HEART DISEASE: ICD-10-CM

## 2018-08-15 DIAGNOSIS — I27.81 COR PULMONALE: ICD-10-CM

## 2018-08-15 DIAGNOSIS — E66.2 PICKWICKIAN SYNDROME: ICD-10-CM

## 2018-08-15 DIAGNOSIS — I27.20 PULMONARY HYPERTENSION: ICD-10-CM

## 2018-08-15 DIAGNOSIS — I48.91 ATRIAL FIBRILLATION, UNSPECIFIED TYPE: ICD-10-CM

## 2018-08-15 DIAGNOSIS — Q21.12 PFO (PATENT FORAMEN OVALE): ICD-10-CM

## 2018-08-15 DIAGNOSIS — E05.90 HYPERTHYROIDISM: ICD-10-CM

## 2018-08-16 DIAGNOSIS — I27.81 COR PULMONALE: ICD-10-CM

## 2018-08-16 DIAGNOSIS — I27.9 CHRONIC PULMONARY HEART DISEASE: ICD-10-CM

## 2018-08-16 DIAGNOSIS — I48.0 PAROXYSMAL ATRIAL FIBRILLATION: ICD-10-CM

## 2018-08-16 DIAGNOSIS — E05.90 HYPERTHYROIDISM: ICD-10-CM

## 2018-08-16 DIAGNOSIS — I48.91 ATRIAL FIBRILLATION, UNSPECIFIED TYPE: ICD-10-CM

## 2018-08-16 DIAGNOSIS — I27.0 PRIMARY PULMONARY HYPERTENSION: ICD-10-CM

## 2018-08-16 DIAGNOSIS — E66.2 PICKWICKIAN SYNDROME: ICD-10-CM

## 2018-08-16 DIAGNOSIS — Q21.12 PFO (PATENT FORAMEN OVALE): ICD-10-CM

## 2018-08-16 DIAGNOSIS — I27.20 PULMONARY HYPERTENSION: ICD-10-CM

## 2018-08-16 RX ORDER — ALLOPURINOL 300 MG/1
300 TABLET ORAL DAILY
Qty: 30 TABLET | Refills: 11
Start: 2018-08-16 | End: 2018-08-16 | Stop reason: SDUPTHER

## 2018-08-16 RX ORDER — ALLOPURINOL 300 MG/1
300 TABLET ORAL DAILY
Qty: 30 TABLET | Refills: 11
Start: 2018-08-16 | End: 2018-08-23 | Stop reason: SDUPTHER

## 2018-08-16 NOTE — TELEPHONE ENCOUNTER
"----- Message from Cathy Wagner sent at 8/16/2018  2:54 PM CDT -----  Contact: self/[phone]  RX request - refill or new RX.  Is this a refill or new RX:  refill  RX name and strength: allopurinol (ZYLOPRIM) 300 MG tablet  Directions:   Is this a 30 day or 90 day RX:    Local pharmacy or mail order pharmacy:  Local pharmacy  Pharmacy name and phone # (DON'T enter "on file" or "in chart"): [facility] [address] [place] [address] AT [address] [phone] (Phone) [phone] (Fax)  Comments:          "

## 2018-08-23 DIAGNOSIS — I27.9 CHRONIC PULMONARY HEART DISEASE: ICD-10-CM

## 2018-08-23 DIAGNOSIS — I48.0 PAROXYSMAL ATRIAL FIBRILLATION: ICD-10-CM

## 2018-08-23 DIAGNOSIS — I27.0 PRIMARY PULMONARY HYPERTENSION: ICD-10-CM

## 2018-08-23 DIAGNOSIS — E05.90 HYPERTHYROIDISM: ICD-10-CM

## 2018-08-23 DIAGNOSIS — Q21.12 PFO (PATENT FORAMEN OVALE): ICD-10-CM

## 2018-08-23 DIAGNOSIS — E66.2 PICKWICKIAN SYNDROME: ICD-10-CM

## 2018-08-23 DIAGNOSIS — I27.81 COR PULMONALE: ICD-10-CM

## 2018-08-23 DIAGNOSIS — I27.20 PULMONARY HYPERTENSION: ICD-10-CM

## 2018-08-23 DIAGNOSIS — I48.91 ATRIAL FIBRILLATION, UNSPECIFIED TYPE: ICD-10-CM

## 2018-08-23 RX ORDER — ALLOPURINOL 300 MG/1
300 TABLET ORAL DAILY
Qty: 90 TABLET | Refills: 3
Start: 2018-08-23 | End: 2019-08-21 | Stop reason: SDUPTHER

## 2018-08-29 NOTE — PROGRESS NOTES
8/29/18 I spoke with patient today about missed INR on 8/20/18 and rescheduled him to have INR 9/5/18 when he goes to infusion center for Lasix - order linked and message attached to this appointment to draw lab work.

## 2018-08-31 ENCOUNTER — PES CALL (OUTPATIENT)
Dept: ADMINISTRATIVE | Facility: CLINIC | Age: 43
End: 2018-08-31

## 2018-09-04 DIAGNOSIS — I27.0 PRIMARY PULMONARY HYPERTENSION: Primary | ICD-10-CM

## 2018-09-05 ENCOUNTER — ANTI-COAG VISIT (OUTPATIENT)
Dept: CARDIOLOGY | Facility: CLINIC | Age: 43
End: 2018-09-05

## 2018-09-05 ENCOUNTER — INFUSION (OUTPATIENT)
Dept: CARDIOLOGY | Facility: HOSPITAL | Age: 43
End: 2018-09-05
Attending: INTERNAL MEDICINE
Payer: MEDICARE

## 2018-09-05 VITALS
DIASTOLIC BLOOD PRESSURE: 70 MMHG | BODY MASS INDEX: 56.46 KG/M2 | OXYGEN SATURATION: 92 % | RESPIRATION RATE: 23 BRPM | TEMPERATURE: 99 F | HEART RATE: 95 BPM | WEIGHT: 315 LBS | SYSTOLIC BLOOD PRESSURE: 121 MMHG

## 2018-09-05 DIAGNOSIS — I27.9 CHRONIC PULMONARY HEART DISEASE: ICD-10-CM

## 2018-09-05 DIAGNOSIS — Z79.01 LONG TERM CURRENT USE OF ANTICOAGULANT THERAPY: ICD-10-CM

## 2018-09-05 DIAGNOSIS — I27.0 PRIMARY PULMONARY HYPERTENSION: Primary | ICD-10-CM

## 2018-09-05 LAB
ALBUMIN SERPL BCP-MCNC: 2.9 G/DL
ALP SERPL-CCNC: 126 U/L
ALT SERPL W/O P-5'-P-CCNC: 18 U/L
ANION GAP SERPL CALC-SCNC: 8 MMOL/L
AST SERPL-CCNC: 20 U/L
BILIRUB DIRECT SERPL-MCNC: 0.2 MG/DL
BILIRUB SERPL-MCNC: 0.6 MG/DL
BNP SERPL-MCNC: <10 PG/ML
BUN SERPL-MCNC: 17 MG/DL
CALCIUM SERPL-MCNC: 9 MG/DL
CHLORIDE SERPL-SCNC: 96 MMOL/L
CO2 SERPL-SCNC: 33 MMOL/L
CREAT SERPL-MCNC: 1.3 MG/DL
EST. GFR  (AFRICAN AMERICAN): >60 ML/MIN/1.73 M^2
EST. GFR  (NON AFRICAN AMERICAN): >60 ML/MIN/1.73 M^2
GLUCOSE SERPL-MCNC: 100 MG/DL
INR PPP: 1.7
MAGNESIUM SERPL-MCNC: 1.5 MG/DL
POTASSIUM SERPL-SCNC: 3.8 MMOL/L
PROT SERPL-MCNC: 8.3 G/DL
PROTHROMBIN TIME: 16.9 SEC
SODIUM SERPL-SCNC: 137 MMOL/L

## 2018-09-05 PROCEDURE — 63600175 PHARM REV CODE 636 W HCPCS: Performed by: PHYSICIAN ASSISTANT

## 2018-09-05 PROCEDURE — 99213 OFFICE O/P EST LOW 20 MIN: CPT | Mod: ,,, | Performed by: INTERNAL MEDICINE

## 2018-09-05 PROCEDURE — 96365 THER/PROPH/DIAG IV INF INIT: CPT

## 2018-09-05 PROCEDURE — 25000003 PHARM REV CODE 250: Performed by: PHYSICIAN ASSISTANT

## 2018-09-05 PROCEDURE — 80076 HEPATIC FUNCTION PANEL: CPT

## 2018-09-05 PROCEDURE — 85610 PROTHROMBIN TIME: CPT

## 2018-09-05 PROCEDURE — 96376 TX/PRO/DX INJ SAME DRUG ADON: CPT

## 2018-09-05 PROCEDURE — 80048 BASIC METABOLIC PNL TOTAL CA: CPT

## 2018-09-05 PROCEDURE — 83735 ASSAY OF MAGNESIUM: CPT

## 2018-09-05 PROCEDURE — 96366 THER/PROPH/DIAG IV INF ADDON: CPT

## 2018-09-05 PROCEDURE — 83880 ASSAY OF NATRIURETIC PEPTIDE: CPT

## 2018-09-05 RX ORDER — FUROSEMIDE 10 MG/ML
80 INJECTION INTRAMUSCULAR; INTRAVENOUS
Status: COMPLETED | OUTPATIENT
Start: 2018-09-05 | End: 2018-09-05

## 2018-09-05 RX ADMIN — FUROSEMIDE 80 MG: 10 INJECTION, SOLUTION INTRAMUSCULAR; INTRAVENOUS at 07:09

## 2018-09-05 RX ADMIN — FUROSEMIDE 20 MG/HR: 10 INJECTION, SOLUTION INTRAMUSCULAR; INTRAVENOUS at 07:09

## 2018-09-05 NOTE — PLAN OF CARE
Problem: Patient Care Overview  Goal: Plan of Care Review  Outcome: Ongoing (interventions implemented as appropriate)  Arrived from home in stable condition.Denies any acute distress at this time.Missed last visit because of allergies. Wt up 1.75 kg from 152.25to 153.9 kg this visit.Orders given per ALEXANDRE BROWN and carried out.IV started and labs sent.Lasix 80 mg ivp followed by lasix drip @20 cc/hr x 6 hours.Will continue to monitor

## 2018-09-05 NOTE — PROGRESS NOTES
INR subtherapeutic, patient reports missing a dose again.  He is taking zyrtec for sinus but no other med changes.  INR has been subtherapeutic the past few visits and mostly related to noncompliance/missed doses.  Will boost and resume and recheck in one week to try and obtain a therapeutic INR.

## 2018-09-05 NOTE — PROGRESS NOTES
Subjective:    Patient ID:  Yong Mcintyre is a 42 y.o. male who presents for follow-up of No chief complaint on file.      Congestive Heart Failure      41 yo AAM presents for biweekly outpatient IV diuretic tx. He remains on Lasix 80 mg bid, Tracleer and Revatio and is on home O2 at 3 LPM. Has gained ~ 80# over the last year 2/2 dietary indiscretion and not going to the gym. Sees Dr. Head at [facility] once a year.  Weight is elevated this visit: up 1.75 kgs from last visit.  He endorses non compliance with diet. Missed last visit because of allergies. Does not want to take additional metolazone today.     Review of Systems   Constitution: Positive for malaise/fatigue.   HENT: Negative.    Eyes: Negative.    Cardiovascular: Positive for dyspnea on exertion and leg swelling.   Respiratory: Positive for shortness of breath and wheezing.    Endocrine: Negative.    Hematologic/Lymphatic: Negative.    Skin: Negative.    Musculoskeletal: Positive for arthritis and joint pain.   Gastrointestinal: Negative.    Genitourinary: Negative.    Neurological: Negative.    Psychiatric/Behavioral: Negative.    Allergic/Immunologic: Negative.         Objective:    Physical Exam   Constitutional: He is oriented to person, place, and time. He appears well-developed and well-nourished.   HENT:   Head: Normocephalic and atraumatic.   Eyes: Conjunctivae are normal. Pupils are equal, round, and reactive to light.   Neck: Normal range of motion. Neck supple. No thyromegaly present.   JVP mid neck   Cardiovascular: Normal rate, regular rhythm and normal heart sounds.   Pulmonary/Chest: Effort normal and breath sounds normal.   Abdominal: Soft. Bowel sounds are normal.   Musculoskeletal: He exhibits edema.   Neurological: He is alert and oriented to person, place, and time.   Skin: Skin is warm and dry.   Psychiatric: He has a normal mood and affect. His behavior is normal. Judgment and thought content normal.         Assessment:       1.  Primary pulmonary hypertension    2. Long term current use of anticoagulant therapy    3. Chronic pulmonary heart disease         Plan:       Lasix 80 mg IVP, then 20 mg/hr X 6 hours.  Followed by Dr. Head at [facility].  Continue biweekly outpatient infusions.

## 2018-09-05 NOTE — PLAN OF CARE
Problem: Cardiac: Heart Failure (Adult)  Goal: Signs and Symptoms of Listed Potential Problems Will be Absent, Minimized or Managed (Cardiac: Heart Failure)  Signs and symptoms of listed potential problems will be absent, minimized or managed by discharge/transition of care (reference Cardiac: Heart Failure (Adult) CPG).   Outcome: Ongoing (interventions implemented as appropriate)  ALEXANDRE BROWN here,notified of am labs and no pm labs ordered.Pt took own kcl 40 mEq and mag ox 400 mg here.Apply ice  And wear supportive shoe to help with foot pain.If pain persist/increase make appt with PCP.Reinforced medication regimen,low sodium diet and 1500 cc fluid restriction daily.Tolerated infusion well.Urine output total 2650 ml with net -1910 ml.Discharge home,vss,neuro intact.Denies any acute distress/concerns at this time.Left floor in stable condition.Return in 2 weeks.Huan

## 2018-09-14 DIAGNOSIS — I27.0 PRIMARY PULMONARY HYPERTENSION: Primary | ICD-10-CM

## 2018-09-17 ENCOUNTER — ANTI-COAG VISIT (OUTPATIENT)
Dept: CARDIOLOGY | Facility: CLINIC | Age: 43
End: 2018-09-17

## 2018-09-17 ENCOUNTER — INFUSION (OUTPATIENT)
Dept: CARDIOLOGY | Facility: HOSPITAL | Age: 43
End: 2018-09-17
Attending: INTERNAL MEDICINE
Payer: MEDICARE

## 2018-09-17 VITALS
HEART RATE: 85 BPM | SYSTOLIC BLOOD PRESSURE: 110 MMHG | TEMPERATURE: 98 F | BODY MASS INDEX: 56.24 KG/M2 | WEIGHT: 315 LBS | DIASTOLIC BLOOD PRESSURE: 73 MMHG | RESPIRATION RATE: 19 BRPM

## 2018-09-17 DIAGNOSIS — I27.9 CHRONIC PULMONARY HEART DISEASE: ICD-10-CM

## 2018-09-17 DIAGNOSIS — Z79.01 LONG TERM CURRENT USE OF ANTICOAGULANT THERAPY: ICD-10-CM

## 2018-09-17 DIAGNOSIS — I27.20 PULMONARY HYPERTENSION: ICD-10-CM

## 2018-09-17 DIAGNOSIS — I27.0 PRIMARY PULMONARY HYPERTENSION: Primary | ICD-10-CM

## 2018-09-17 LAB
ANION GAP SERPL CALC-SCNC: 9 MMOL/L
BNP SERPL-MCNC: <10 PG/ML
BUN SERPL-MCNC: 20 MG/DL
CALCIUM SERPL-MCNC: 9.1 MG/DL
CHLORIDE SERPL-SCNC: 96 MMOL/L
CO2 SERPL-SCNC: 32 MMOL/L
CREAT SERPL-MCNC: 1 MG/DL
EST. GFR  (AFRICAN AMERICAN): >60 ML/MIN/1.73 M^2
EST. GFR  (NON AFRICAN AMERICAN): >60 ML/MIN/1.73 M^2
GLUCOSE SERPL-MCNC: 102 MG/DL
INR PPP: 2
MAGNESIUM SERPL-MCNC: 1.7 MG/DL
POTASSIUM SERPL-SCNC: 3.6 MMOL/L
PROTHROMBIN TIME: 20 SEC
SODIUM SERPL-SCNC: 137 MMOL/L

## 2018-09-17 PROCEDURE — 25000003 PHARM REV CODE 250: Performed by: PHYSICIAN ASSISTANT

## 2018-09-17 PROCEDURE — 96365 THER/PROPH/DIAG IV INF INIT: CPT

## 2018-09-17 PROCEDURE — 99213 OFFICE O/P EST LOW 20 MIN: CPT | Mod: ,,, | Performed by: INTERNAL MEDICINE

## 2018-09-17 PROCEDURE — 63600175 PHARM REV CODE 636 W HCPCS: Performed by: PHYSICIAN ASSISTANT

## 2018-09-17 PROCEDURE — 83880 ASSAY OF NATRIURETIC PEPTIDE: CPT

## 2018-09-17 PROCEDURE — 96366 THER/PROPH/DIAG IV INF ADDON: CPT

## 2018-09-17 PROCEDURE — 80048 BASIC METABOLIC PNL TOTAL CA: CPT

## 2018-09-17 PROCEDURE — 83735 ASSAY OF MAGNESIUM: CPT

## 2018-09-17 PROCEDURE — 85610 PROTHROMBIN TIME: CPT

## 2018-09-17 PROCEDURE — 96376 TX/PRO/DX INJ SAME DRUG ADON: CPT

## 2018-09-17 RX ORDER — FUROSEMIDE 10 MG/ML
80 INJECTION INTRAMUSCULAR; INTRAVENOUS
Status: COMPLETED | OUTPATIENT
Start: 2018-09-17 | End: 2018-09-17

## 2018-09-17 RX ORDER — FUROSEMIDE 40 MG/1
80 TABLET ORAL 2 TIMES DAILY
Qty: 120 TABLET | Refills: 3 | Status: SHIPPED | OUTPATIENT
Start: 2018-09-17 | End: 2019-02-22 | Stop reason: ALTCHOICE

## 2018-09-17 RX ADMIN — FUROSEMIDE 80 MG: 10 INJECTION, SOLUTION INTRAMUSCULAR; INTRAVENOUS at 08:09

## 2018-09-17 RX ADMIN — FUROSEMIDE 20 MG/HR: 10 INJECTION, SOLUTION INTRAVENOUS at 08:09

## 2018-09-17 NOTE — PLAN OF CARE
Problem: Cardiac: Heart Failure (Adult)  Goal: Signs and Symptoms of Listed Potential Problems Will be Absent, Minimized or Managed (Cardiac: Heart Failure)  Signs and symptoms of listed potential problems will be absent, minimized or managed by discharge/transition of care (reference Cardiac: Heart Failure (Adult) CPG).   Outcome: Ongoing (interventions implemented as appropriate)  D Yevgeniy BROWN here,notified of am labs and no pm labs ordered.Pt refused Kcl and Mag ox here today..Stating he will take his own at home.Non compliant with diet and po fluid intake.Reinforced the importance of following medication regimen,low sodium diet and 1500 cc fluid restriction.Tolerated infusion well.Urine output total 1800 ml with net -2135 ml.Discharge home,vss,neuro intact.Denies any acute distress/concerns at this time.Left floor in stable condition.Return in 2 weeks.Huan

## 2018-09-17 NOTE — PLAN OF CARE
Problem: Patient Care Overview  Goal: Plan of Care Review  Arrived from home in stable condition.Denies any acute distress at this time.Wt stable from 153.9 to 153.3 kg this visit.Orders given per D Rolling PA and carried out.IV started and labs sent.Lasix 80 mg ivp followed by lasix drip @ 20 cc/hr x 6 hours.Will continue to monitor

## 2018-09-18 NOTE — PROGRESS NOTES
Subjective:    Patient ID:  Yong Mcintyre is a 42 y.o. male who presents for follow-up of No chief complaint on file.      Congestive Heart Failure      43 yo AAM presents for biweekly outpatient IV diuretic tx. He remains on Lasix 80 mg bid, Tracleer and Revatio and is on home O2 at 3 LPM.  Sees Dr. Head at [facility] once a year.  No new complaints today. Started going back to the gym again.    Review of Systems   Constitution: Positive for malaise/fatigue.   HENT: Negative.    Eyes: Negative.    Cardiovascular: Positive for dyspnea on exertion and leg swelling.   Respiratory: Positive for shortness of breath.    Endocrine: Negative.    Hematologic/Lymphatic: Negative.    Skin: Negative.    Musculoskeletal: Positive for arthritis and joint pain.   Gastrointestinal: Negative.    Genitourinary: Negative.    Neurological: Negative.    Psychiatric/Behavioral: Negative.    Allergic/Immunologic: Negative.         Objective:    Physical Exam   Constitutional: He is oriented to person, place, and time. He appears well-developed and well-nourished.   HENT:   Head: Normocephalic and atraumatic.   Eyes: Conjunctivae are normal. Pupils are equal, round, and reactive to light.   Neck: Normal range of motion. Neck supple.   Cardiovascular: Normal rate, regular rhythm and normal heart sounds.   Pulmonary/Chest: Effort normal and breath sounds normal.   Abdominal: Soft. Bowel sounds are normal.   Musculoskeletal: He exhibits edema.   Neurological: He is alert and oriented to person, place, and time.   Skin: Skin is warm and dry.   Psychiatric: He has a normal mood and affect. His behavior is normal. Judgment and thought content normal.         Assessment:       1. Primary pulmonary hypertension    2. Long term current use of anticoagulant therapy    3. Chronic pulmonary heart disease    4. Pulmonary hypertension         Plan:       Lasix 80 mg IVP, then 20 mg/hr X 6 hours.  Followed by Dr. Head at [facility].  Continue  biweekly outpatient infusions.

## 2018-09-28 DIAGNOSIS — I27.20 PHT (PULMONARY HYPERTENSION): Primary | ICD-10-CM

## 2018-10-01 ENCOUNTER — INFUSION (OUTPATIENT)
Dept: CARDIOLOGY | Facility: HOSPITAL | Age: 43
End: 2018-10-01
Attending: INTERNAL MEDICINE
Payer: MEDICARE

## 2018-10-01 ENCOUNTER — ANTI-COAG VISIT (OUTPATIENT)
Dept: CARDIOLOGY | Facility: CLINIC | Age: 43
End: 2018-10-01

## 2018-10-01 VITALS
SYSTOLIC BLOOD PRESSURE: 114 MMHG | RESPIRATION RATE: 22 BRPM | TEMPERATURE: 98 F | OXYGEN SATURATION: 92 % | BODY MASS INDEX: 56.37 KG/M2 | WEIGHT: 315 LBS | DIASTOLIC BLOOD PRESSURE: 75 MMHG | HEART RATE: 78 BPM

## 2018-10-01 DIAGNOSIS — Z79.01 LONG TERM CURRENT USE OF ANTICOAGULANT THERAPY: ICD-10-CM

## 2018-10-01 DIAGNOSIS — I27.0 PRIMARY PULMONARY HYPERTENSION: Primary | ICD-10-CM

## 2018-10-01 DIAGNOSIS — I27.9 CHRONIC PULMONARY HEART DISEASE: ICD-10-CM

## 2018-10-01 LAB
ALBUMIN SERPL BCP-MCNC: 2.9 G/DL
ALP SERPL-CCNC: 132 U/L
ALT SERPL W/O P-5'-P-CCNC: 20 U/L
ANION GAP SERPL CALC-SCNC: 8 MMOL/L
AST SERPL-CCNC: 21 U/L
BILIRUB DIRECT SERPL-MCNC: 0.3 MG/DL
BILIRUB SERPL-MCNC: 0.6 MG/DL
BNP SERPL-MCNC: <10 PG/ML
BUN SERPL-MCNC: 18 MG/DL
CALCIUM SERPL-MCNC: 9.1 MG/DL
CHLORIDE SERPL-SCNC: 100 MMOL/L
CO2 SERPL-SCNC: 31 MMOL/L
CREAT SERPL-MCNC: 1.1 MG/DL
EST. GFR  (AFRICAN AMERICAN): >60 ML/MIN/1.73 M^2
EST. GFR  (NON AFRICAN AMERICAN): >60 ML/MIN/1.73 M^2
GLUCOSE SERPL-MCNC: 117 MG/DL
INR PPP: 1.9
MAGNESIUM SERPL-MCNC: 1.6 MG/DL
POTASSIUM SERPL-SCNC: 3.8 MMOL/L
PROT SERPL-MCNC: 8.2 G/DL
PROTHROMBIN TIME: 18.1 SEC
SODIUM SERPL-SCNC: 139 MMOL/L

## 2018-10-01 PROCEDURE — 63600175 PHARM REV CODE 636 W HCPCS: Performed by: INTERNAL MEDICINE

## 2018-10-01 PROCEDURE — 83880 ASSAY OF NATRIURETIC PEPTIDE: CPT

## 2018-10-01 PROCEDURE — 25000003 PHARM REV CODE 250: Performed by: PHYSICIAN ASSISTANT

## 2018-10-01 PROCEDURE — 96376 TX/PRO/DX INJ SAME DRUG ADON: CPT

## 2018-10-01 PROCEDURE — 96366 THER/PROPH/DIAG IV INF ADDON: CPT

## 2018-10-01 PROCEDURE — 83735 ASSAY OF MAGNESIUM: CPT

## 2018-10-01 PROCEDURE — 80076 HEPATIC FUNCTION PANEL: CPT

## 2018-10-01 PROCEDURE — 85610 PROTHROMBIN TIME: CPT

## 2018-10-01 PROCEDURE — 63600175 PHARM REV CODE 636 W HCPCS: Performed by: PHYSICIAN ASSISTANT

## 2018-10-01 PROCEDURE — 80048 BASIC METABOLIC PNL TOTAL CA: CPT

## 2018-10-01 PROCEDURE — 96365 THER/PROPH/DIAG IV INF INIT: CPT

## 2018-10-01 PROCEDURE — 99213 OFFICE O/P EST LOW 20 MIN: CPT | Mod: ,,, | Performed by: INTERNAL MEDICINE

## 2018-10-01 RX ORDER — FUROSEMIDE 10 MG/ML
40 INJECTION INTRAMUSCULAR; INTRAVENOUS
Status: DISCONTINUED | OUTPATIENT
Start: 2018-10-01 | End: 2018-10-15 | Stop reason: ALTCHOICE

## 2018-10-01 RX ORDER — FUROSEMIDE 10 MG/ML
80 INJECTION INTRAMUSCULAR; INTRAVENOUS
Status: COMPLETED | OUTPATIENT
Start: 2018-10-01 | End: 2018-10-01

## 2018-10-01 RX ADMIN — FUROSEMIDE 20 MG/HR: 10 INJECTION, SOLUTION INTRAMUSCULAR; INTRAVENOUS at 07:10

## 2018-10-01 RX ADMIN — FUROSEMIDE 80 MG: 10 INJECTION, SOLUTION INTRAMUSCULAR; INTRAVENOUS at 07:10

## 2018-10-01 NOTE — PROGRESS NOTES
Subjective:    Patient ID:  Yong Mcintyre is a 43 y.o. male who presents for follow-up of No chief complaint on file.      Congestive Heart Failure   Associated symptoms include shortness of breath.    41 yo AAM presents for biweekly outpatient IV diuretic tx. He remains on Lasix 80 mg bid, Tracleer and Revatio and is on home O2 at 3 LPM.  Sees Dr. Head at [facility] once a year.  Patient complained of stomach pain and left infusion suite early    Review of Systems   Constitution: Positive for malaise/fatigue.   HENT: Negative.    Eyes: Negative.    Cardiovascular: Positive for dyspnea on exertion and leg swelling.   Respiratory: Positive for shortness of breath.    Endocrine: Negative.    Hematologic/Lymphatic: Negative.    Skin: Negative.    Musculoskeletal: Positive for arthritis and joint pain.   Gastrointestinal: Negative.    Genitourinary: Negative.    Neurological: Negative.    Psychiatric/Behavioral: Negative.    Allergic/Immunologic: Negative.         Objective:    Physical Exam   Constitutional: He is oriented to person, place, and time. He appears well-developed and well-nourished.   HENT:   Head: Normocephalic and atraumatic.   Eyes: Conjunctivae are normal. Pupils are equal, round, and reactive to light.   Neck: Normal range of motion. Neck supple.   Cardiovascular: Normal rate, regular rhythm and normal heart sounds.   Pulmonary/Chest: Effort normal and breath sounds normal.   Abdominal: Soft. Bowel sounds are normal.   Musculoskeletal: He exhibits edema.   Neurological: He is alert and oriented to person, place, and time.   Skin: Skin is warm and dry.   Psychiatric: He has a normal mood and affect. His behavior is normal. Judgment and thought content normal.         Assessment:       1. Primary pulmonary hypertension    2. Long term current use of anticoagulant therapy    3. Chronic pulmonary heart disease         Plan:       Lasix 80 mg IVP, then 20 mg/hr X 6 hours- patient did not finish complete  infusion.  He left early secondary to stomach pain   Followed by Dr. Head at [facility].  Continue biweekly outpatient infusions.

## 2018-10-01 NOTE — PLAN OF CARE
Problem: Cardiac: Heart Failure (Adult)  Goal: Signs and Symptoms of Listed Potential Problems Will be Absent, Minimized or Managed (Cardiac: Heart Failure)  Signs and symptoms of listed potential problems will be absent, minimized or managed by discharge/transition of care (reference Cardiac: Heart Failure (Adult) CPG).   Outcome: Ongoing (interventions implemented as appropriate)  Pt decided to stop infusion because of stomach ache.Drank a 12 oz cup of coffee prior to arrival.Notified S Davin BROWN of morning labs and pt took his mag ox 800 and Kcl 20 prior to leaving.Instructed if abd pain increase or before it  become unbearable call MD or get to ED asap..Verbalized understanding.Coumadin called for pt .notified to call hi m on his cell phone.after 12 pm.Jam Flanagan MA verbalized understanding.Refused W/C and left floor ambulating in stable condition.No acute distress noted.Return in 2 weeks.

## 2018-10-04 ENCOUNTER — OFFICE VISIT (OUTPATIENT)
Dept: INTERNAL MEDICINE | Facility: CLINIC | Age: 43
End: 2018-10-04
Payer: MEDICARE

## 2018-10-04 ENCOUNTER — HOSPITAL ENCOUNTER (OUTPATIENT)
Dept: RADIOLOGY | Facility: HOSPITAL | Age: 43
Discharge: HOME | End: 2018-10-04
Attending: INTERNAL MEDICINE
Payer: MEDICARE

## 2018-10-04 VITALS
OXYGEN SATURATION: 89 % | WEIGHT: 315 LBS | SYSTOLIC BLOOD PRESSURE: 130 MMHG | HEART RATE: 92 BPM | HEIGHT: 65 IN | DIASTOLIC BLOOD PRESSURE: 72 MMHG | BODY MASS INDEX: 52.48 KG/M2

## 2018-10-04 DIAGNOSIS — M79.671 RIGHT FOOT PAIN: ICD-10-CM

## 2018-10-04 DIAGNOSIS — M79.671 RIGHT FOOT PAIN: Primary | ICD-10-CM

## 2018-10-04 PROCEDURE — 99214 OFFICE O/P EST MOD 30 MIN: CPT | Mod: PBBFAC,PO | Performed by: INTERNAL MEDICINE

## 2018-10-04 PROCEDURE — 3078F DIAST BP <80 MM HG: CPT | Mod: CPTII,,, | Performed by: INTERNAL MEDICINE

## 2018-10-04 PROCEDURE — 99999 PR PBB SHADOW E&M-EST. PATIENT-LVL IV: CPT | Mod: PBBFAC,,, | Performed by: INTERNAL MEDICINE

## 2018-10-04 PROCEDURE — 73630 X-RAY EXAM OF FOOT: CPT | Mod: TC,FY,PO,RT

## 2018-10-04 PROCEDURE — 73630 X-RAY EXAM OF FOOT: CPT | Mod: 26,RT,, | Performed by: RADIOLOGY

## 2018-10-04 PROCEDURE — 99213 OFFICE O/P EST LOW 20 MIN: CPT | Mod: S$PBB,,, | Performed by: INTERNAL MEDICINE

## 2018-10-04 PROCEDURE — 3008F BODY MASS INDEX DOCD: CPT | Mod: CPTII,,, | Performed by: INTERNAL MEDICINE

## 2018-10-04 PROCEDURE — 3075F SYST BP GE 130 - 139MM HG: CPT | Mod: CPTII,,, | Performed by: INTERNAL MEDICINE

## 2018-10-04 RX ORDER — DICLOFENAC SODIUM 10 MG/G
2 GEL TOPICAL 4 TIMES DAILY
Qty: 100 G | Refills: 0 | Status: SHIPPED | OUTPATIENT
Start: 2018-10-04 | End: 2020-10-13

## 2018-10-04 NOTE — PROGRESS NOTES
REASON FOR VISIT:  This is a 43-year-old male.  For about a month, he has been   having pain over the lateral aspect of his right foot which is more noticeable   when he is exercising such as the treadmill.  It seems to be getting a little   bit better, only because he has not been exercising as much.  He has not noticed   any swelling.  He cannot remember any direct trauma.    PAST MEDICAL HISTORY:  1.  Chronic pulmonary heart disease with primary pulmonary hypertension.  2.  Hypertension.  3.  Diastolic dysfunction.    MEDICATION LIST:  Per MedCard, which includes that of Coumadin.    PHYSICAL EXAMINATION:  VITAL SIGNS:  Per EPIC.  EXTREMITIES:  He has 2+ pedal pulses.  Trace pedal edema.  There is one   localized area of tenderness over the lateral fifth metatarsal; however, it does   not feel warm.  There is no deformity.    IMPRESSION:  Fifth metatarsal pain, probably chronic strain.    PLAN:  We will get an x-ray of the foot to make sure there is no fracture.    Diclofenac ointment up to four times a day was given.      JAM/HN  dd: 10/04/2018 14:28:17 (CDT)  td: 10/04/2018 22:28:48 (CDT)  Doc ID   #[id number]  Job ID #[id number]    CC:

## 2018-10-15 ENCOUNTER — INFUSION (OUTPATIENT)
Dept: CARDIOLOGY | Facility: HOSPITAL | Age: 43
End: 2018-10-15
Attending: INTERNAL MEDICINE
Payer: MEDICARE

## 2018-10-15 ENCOUNTER — ANTI-COAG VISIT (OUTPATIENT)
Dept: CARDIOLOGY | Facility: CLINIC | Age: 43
End: 2018-10-15

## 2018-10-15 VITALS
BODY MASS INDEX: 55.97 KG/M2 | RESPIRATION RATE: 32 BRPM | TEMPERATURE: 98 F | DIASTOLIC BLOOD PRESSURE: 78 MMHG | SYSTOLIC BLOOD PRESSURE: 116 MMHG | WEIGHT: 315 LBS | HEART RATE: 90 BPM | OXYGEN SATURATION: 94 %

## 2018-10-15 DIAGNOSIS — I27.0 PRIMARY PULMONARY HYPERTENSION: Primary | ICD-10-CM

## 2018-10-15 DIAGNOSIS — Z79.01 LONG TERM CURRENT USE OF ANTICOAGULANT THERAPY: ICD-10-CM

## 2018-10-15 LAB
ANION GAP SERPL CALC-SCNC: 7 MMOL/L
BNP SERPL-MCNC: <10 PG/ML
BUN SERPL-MCNC: 17 MG/DL
CALCIUM SERPL-MCNC: 9.1 MG/DL
CHLORIDE SERPL-SCNC: 98 MMOL/L
CO2 SERPL-SCNC: 33 MMOL/L
CREAT SERPL-MCNC: 1.1 MG/DL
EST. GFR  (AFRICAN AMERICAN): >60 ML/MIN/1.73 M^2
EST. GFR  (NON AFRICAN AMERICAN): >60 ML/MIN/1.73 M^2
GLUCOSE SERPL-MCNC: 102 MG/DL
INR PPP: 2.7
MAGNESIUM SERPL-MCNC: 1.6 MG/DL
POTASSIUM SERPL-SCNC: 3.7 MMOL/L
PROTHROMBIN TIME: 26.5 SEC
SODIUM SERPL-SCNC: 138 MMOL/L

## 2018-10-15 PROCEDURE — 25000003 PHARM REV CODE 250: Performed by: NURSE PRACTITIONER

## 2018-10-15 PROCEDURE — 63600175 PHARM REV CODE 636 W HCPCS: Performed by: NURSE PRACTITIONER

## 2018-10-15 PROCEDURE — 99499 UNLISTED E&M SERVICE: CPT | Mod: HCNC,,, | Performed by: PHYSICIAN ASSISTANT

## 2018-10-15 PROCEDURE — 83735 ASSAY OF MAGNESIUM: CPT

## 2018-10-15 PROCEDURE — 83880 ASSAY OF NATRIURETIC PEPTIDE: CPT

## 2018-10-15 PROCEDURE — 96365 THER/PROPH/DIAG IV INF INIT: CPT

## 2018-10-15 PROCEDURE — 85610 PROTHROMBIN TIME: CPT

## 2018-10-15 PROCEDURE — 99213 OFFICE O/P EST LOW 20 MIN: CPT | Mod: ,,, | Performed by: INTERNAL MEDICINE

## 2018-10-15 PROCEDURE — 25000003 PHARM REV CODE 250: Performed by: PHYSICIAN ASSISTANT

## 2018-10-15 PROCEDURE — 80048 BASIC METABOLIC PNL TOTAL CA: CPT

## 2018-10-15 PROCEDURE — 96366 THER/PROPH/DIAG IV INF ADDON: CPT

## 2018-10-15 PROCEDURE — 96376 TX/PRO/DX INJ SAME DRUG ADON: CPT

## 2018-10-15 RX ORDER — POTASSIUM CHLORIDE 750 MG/1
20 TABLET, EXTENDED RELEASE ORAL
Status: COMPLETED | OUTPATIENT
Start: 2018-10-15 | End: 2018-10-15

## 2018-10-15 RX ORDER — FUROSEMIDE 10 MG/ML
80 INJECTION INTRAMUSCULAR; INTRAVENOUS
Status: COMPLETED | OUTPATIENT
Start: 2018-10-15 | End: 2018-10-15

## 2018-10-15 RX ADMIN — POTASSIUM CHLORIDE 20 MEQ: 750 TABLET, EXTENDED RELEASE ORAL at 01:10

## 2018-10-15 RX ADMIN — Medication 400 MG: at 01:10

## 2018-10-15 RX ADMIN — FUROSEMIDE 80 MG: 10 INJECTION, SOLUTION INTRAMUSCULAR; INTRAVENOUS at 08:10

## 2018-10-15 RX ADMIN — FUROSEMIDE 20 MG/HR: 10 INJECTION, SOLUTION INTRAMUSCULAR; INTRAVENOUS at 08:10

## 2018-10-15 NOTE — PLAN OF CARE
Problem: Patient Care Overview  Goal: Plan of Care Review  Outcome: Ongoing (interventions implemented as appropriate)  Arrived from home in stable condition.Denies any acute distress at this time.Wt down 1.10 kg from 153.65 to 152.55 kg this visit.Orders given per S Bryan NP and carried out.IV started and labs sent.Lasix 80 mg ivp followed by lasix drip @20 cc/hr x 6 hours.Will continue to monitor.

## 2018-10-15 NOTE — PLAN OF CARE
Problem: Cardiac: Heart Failure (Adult)  Goal: Signs and Symptoms of Listed Potential Problems Will be Absent, Minimized or Managed (Cardiac: Heart Failure)  Signs and symptoms of listed potential problems will be absent, minimized or managed by discharge/transition of care (reference Cardiac: Heart Failure (Adult) CPG).   Outcome: Ongoing (interventions implemented as appropriate)  D Yevgeniy BROWN here,notified of am labs and no pm labs ordered.Pt took own kcl 30 mq(otc) and Mag ox 800 mg.Supplemented with kcl 20 mEq and mag ox 400 mg here.Reinforced the importance of following the medication regimen daily.Tolerated infusion well.Urine output total 1725 ml with net -1060 ml.Discharge home,vss,neuro intact.Denies any acute distress/concerns at this time.Left floor in stable condition via w/c with escort at side.Return in 2 weeks.Huan

## 2018-10-17 RX ORDER — FUROSEMIDE 40 MG/1
TABLET ORAL
Qty: 360 TABLET | Refills: 0 | Status: SHIPPED | OUTPATIENT
Start: 2018-10-17 | End: 2018-10-29 | Stop reason: ALTCHOICE

## 2018-10-19 RX ORDER — METOPROLOL TARTRATE 25 MG/1
TABLET, FILM COATED ORAL
Qty: 180 TABLET | Refills: 3 | Status: SHIPPED | OUTPATIENT
Start: 2018-10-19 | End: 2019-10-28 | Stop reason: SDUPTHER

## 2018-10-23 NOTE — PROGRESS NOTES
Subjective:    Patient ID:  Yong Mcintyre is a 43 y.o. male who presents for follow-up of CHF    HPI  43 yo AAM presents for biweekly outpatient IV diuretic tx. He remains on Lasix 80 mg bid, Tracleer and Revatio and is on home O2 at 3 LPM.  Sees Dr. Head at [facility] once a year.  No new complaints today.     Review of Systems   Constitution: Negative for weight gain.   Cardiovascular: Positive for dyspnea on exertion. Negative for leg swelling.   Respiratory: Positive for shortness of breath.    Gastrointestinal: Negative for nausea.   Neurological: Negative for dizziness and light-headedness.        Objective:    Physical Exam   Constitutional: He is oriented to person, place, and time. He appears well-developed and well-nourished.   /78 (BP Location: Right arm, Patient Position: Sitting)   Pulse 90   Temp 98.2 °F (36.8 °C) (Oral)   Resp (!) 32   Wt (!) 152.6 kg (336 lb 5 oz)   SpO2 (!) 94%   BMI 55.97 kg/m²      Neck: JVD present.   Difficult to assess for JVD due to neck girth   Cardiovascular: Normal rate, regular rhythm and normal heart sounds.   Pulses:       Radial pulses are 2+ on the right side, and 2+ on the left side.   Pulmonary/Chest: Effort normal and breath sounds normal.   Abdominal: Soft. Bowel sounds are normal. There is no tenderness.   Musculoskeletal: He exhibits no edema.   Neurological: He is alert and oriented to person, place, and time.   Skin: Skin is warm and dry.         Assessment:       1. Primary pulmonary hypertension    2. Long term current use of anticoagulant therapy         Plan:       Lasix 80 mg IVP, then 20 mg/hr X 6 hours  Followed by Dr. Head at [facility].  Continue biweekly outpatient infusions.

## 2018-10-25 DIAGNOSIS — I27.0 PRIMARY PULMONARY HYPERTENSION: Primary | ICD-10-CM

## 2018-10-29 ENCOUNTER — INFUSION (OUTPATIENT)
Dept: CARDIOLOGY | Facility: HOSPITAL | Age: 43
End: 2018-10-29
Attending: INTERNAL MEDICINE
Payer: MEDICARE

## 2018-10-29 ENCOUNTER — ANTI-COAG VISIT (OUTPATIENT)
Dept: CARDIOLOGY | Facility: CLINIC | Age: 43
End: 2018-10-29

## 2018-10-29 VITALS
WEIGHT: 315 LBS | BODY MASS INDEX: 56.09 KG/M2 | HEART RATE: 83 BPM | SYSTOLIC BLOOD PRESSURE: 117 MMHG | OXYGEN SATURATION: 95 % | RESPIRATION RATE: 19 BRPM | TEMPERATURE: 99 F | DIASTOLIC BLOOD PRESSURE: 66 MMHG

## 2018-10-29 DIAGNOSIS — I27.81 COR PULMONALE: ICD-10-CM

## 2018-10-29 DIAGNOSIS — Z79.01 LONG TERM CURRENT USE OF ANTICOAGULANT THERAPY: ICD-10-CM

## 2018-10-29 DIAGNOSIS — I27.20 PULMONARY HYPERTENSION: ICD-10-CM

## 2018-10-29 DIAGNOSIS — I27.0 PRIMARY PULMONARY HYPERTENSION: Primary | ICD-10-CM

## 2018-10-29 DIAGNOSIS — I51.89 DIASTOLIC DYSFUNCTION: ICD-10-CM

## 2018-10-29 LAB
ANION GAP SERPL CALC-SCNC: 6 MMOL/L
BNP SERPL-MCNC: <10 PG/ML
BUN SERPL-MCNC: 21 MG/DL
CALCIUM SERPL-MCNC: 9.3 MG/DL
CHLORIDE SERPL-SCNC: 95 MMOL/L
CO2 SERPL-SCNC: 36 MMOL/L
CREAT SERPL-MCNC: 1.2 MG/DL
EST. GFR  (AFRICAN AMERICAN): >60 ML/MIN/1.73 M^2
EST. GFR  (NON AFRICAN AMERICAN): >60 ML/MIN/1.73 M^2
GLUCOSE SERPL-MCNC: 106 MG/DL
INR PPP: 1.3
MAGNESIUM SERPL-MCNC: 2.1 MG/DL
POTASSIUM SERPL-SCNC: 4 MMOL/L
PROTHROMBIN TIME: 13.3 SEC
SODIUM SERPL-SCNC: 137 MMOL/L

## 2018-10-29 PROCEDURE — 83735 ASSAY OF MAGNESIUM: CPT

## 2018-10-29 PROCEDURE — 63600175 PHARM REV CODE 636 W HCPCS: Performed by: PHYSICIAN ASSISTANT

## 2018-10-29 PROCEDURE — 96376 TX/PRO/DX INJ SAME DRUG ADON: CPT

## 2018-10-29 PROCEDURE — 83880 ASSAY OF NATRIURETIC PEPTIDE: CPT

## 2018-10-29 PROCEDURE — 80048 BASIC METABOLIC PNL TOTAL CA: CPT

## 2018-10-29 PROCEDURE — 25000003 PHARM REV CODE 250: Performed by: PHYSICIAN ASSISTANT

## 2018-10-29 PROCEDURE — 96365 THER/PROPH/DIAG IV INF INIT: CPT

## 2018-10-29 PROCEDURE — 99213 OFFICE O/P EST LOW 20 MIN: CPT | Mod: ,,, | Performed by: INTERNAL MEDICINE

## 2018-10-29 PROCEDURE — 96366 THER/PROPH/DIAG IV INF ADDON: CPT

## 2018-10-29 PROCEDURE — 99499 UNLISTED E&M SERVICE: CPT | Mod: HCNC,,, | Performed by: PHYSICIAN ASSISTANT

## 2018-10-29 PROCEDURE — 85610 PROTHROMBIN TIME: CPT

## 2018-10-29 RX ORDER — FUROSEMIDE 10 MG/ML
80 INJECTION INTRAMUSCULAR; INTRAVENOUS
Status: COMPLETED | OUTPATIENT
Start: 2018-10-29 | End: 2018-10-29

## 2018-10-29 RX ADMIN — FUROSEMIDE 20 MG/HR: 10 INJECTION, SOLUTION INTRAMUSCULAR; INTRAVENOUS at 08:10

## 2018-10-29 RX ADMIN — FUROSEMIDE 80 MG: 10 INJECTION, SOLUTION INTRAMUSCULAR; INTRAVENOUS at 08:10

## 2018-10-29 NOTE — PROGRESS NOTES
Patient advised to take coumadin (15mg) Monday, (10mg) ALL other days. Also to re test INR on 11/12. Patient verbalized understanding.

## 2018-10-29 NOTE — PLAN OF CARE
Problem: Patient Care Overview  Goal: Plan of Care Review  Outcome: Ongoing (interventions implemented as appropriate)  Arrived from home in stable condition.Denies any acute distress at this time.Reported over eating protein bars this weekend.Wt stable at152.9 kg this visit.Orders given per D Rolling PA and carried out.IV started and labs sent.Lasix 80 mg ivp followed by lasix drip @ 20 cc/hr  x 6 hours.Will continue to monitor

## 2018-10-29 NOTE — PLAN OF CARE
Problem: Cardiac: Heart Failure (Adult)  Goal: Signs and Symptoms of Listed Potential Problems Will be Absent, Minimized or Managed (Cardiac: Heart Failure)  Signs and symptoms of listed potential problems will be absent, minimized or managed by discharge/transition of care (reference Cardiac: Heart Failure (Adult) CPG).   Outcome: Ongoing (interventions implemented as appropriate)  LUCERO BROWN here,notified of am labs and no pm labs ordered.Reinforced low sodium diet,1500 cc fluid restriction and coumadin clinic MA spoke with pt via phone with med regimen.Instructed  to get his flu soon at any RX .Tolerated infusion well.Urine output total 2325 ml with net -1635 ml.Discharge home,vss,neuro intact.Denies any acute distress/concerns at this time.Left floor in stable condition.Return in 2 weeks.Huan

## 2018-10-29 NOTE — PROGRESS NOTES
INR subtherapeutic today.  Patient reports some dietary changes and a missed dose.  Current dose has produced therapeutic INR in the past.  Will boost today's dose and resume routine weekly dose.

## 2018-10-30 NOTE — PROGRESS NOTES
Subjective:    Patient ID:  Yong Mcintyre is a 43 y.o. male who presents for follow-up of CHF    HPI    41 yo AAM presents for biweekly outpatient IV diuretic tx. He remains on Lasix 80 mg bid, Tracleer and Revatio and is on home O2 at 3 LPM.  Sees Dr. Head at [facility] once a year.  No new complaints today. Weight is stable.    Review of Systems   Constitution: Negative for weight gain.   Cardiovascular: Positive for dyspnea on exertion. Negative for leg swelling.   Respiratory: Positive for shortness of breath.    Gastrointestinal: Negative for nausea.   Neurological: Negative for dizziness and light-headedness.        Objective:    Physical Exam   Constitutional: He is oriented to person, place, and time. He appears well-developed and well-nourished.   /78 (BP Location: Right arm, Patient Position: Sitting)   Pulse 90   Temp 98.2 °F (36.8 °C) (Oral)   Resp (!) 32   Wt (!) 152.6 kg (336 lb 5 oz)   SpO2 (!) 94%   BMI 55.97 kg/m²      Neck: JVD present.   Difficult to assess for JVD due to neck girth   Cardiovascular: Normal rate, regular rhythm and normal heart sounds.   Pulses:       Radial pulses are 2+ on the right side, and 2+ on the left side.   Pulmonary/Chest: Effort normal and breath sounds normal.   Abdominal: Soft. Bowel sounds are normal. There is no tenderness.   Musculoskeletal: He exhibits edema (1+ bilat LE edema).   Neurological: He is alert and oriented to person, place, and time.   Skin: Skin is warm and dry.         Assessment:       1. Primary pulmonary hypertension    2. Long term current use of anticoagulant therapy    3. Cor pulmonale    4. Diastolic dysfunction    5. Pulmonary hypertension         Plan:       Lasix 80 mg IVP, then 20 mg/hr X 6 hours  Followed by Dr. Head at [facility].  Continue biweekly outpatient infusions.

## 2018-11-09 DIAGNOSIS — I27.0 PRIMARY PULMONARY HYPERTENSION: Primary | ICD-10-CM

## 2018-11-12 ENCOUNTER — INFUSION (OUTPATIENT)
Dept: CARDIOLOGY | Facility: HOSPITAL | Age: 43
End: 2018-11-12
Attending: INTERNAL MEDICINE
Payer: MEDICARE

## 2018-11-12 ENCOUNTER — ANTI-COAG VISIT (OUTPATIENT)
Dept: CARDIOLOGY | Facility: CLINIC | Age: 43
End: 2018-11-12

## 2018-11-12 VITALS
SYSTOLIC BLOOD PRESSURE: 113 MMHG | RESPIRATION RATE: 16 BRPM | TEMPERATURE: 99 F | BODY MASS INDEX: 57.12 KG/M2 | DIASTOLIC BLOOD PRESSURE: 71 MMHG | WEIGHT: 315 LBS | HEART RATE: 81 BPM | OXYGEN SATURATION: 95 %

## 2018-11-12 DIAGNOSIS — I27.0 PRIMARY PULMONARY HYPERTENSION: Primary | ICD-10-CM

## 2018-11-12 DIAGNOSIS — Z79.01 LONG TERM CURRENT USE OF ANTICOAGULANT THERAPY: ICD-10-CM

## 2018-11-12 LAB
ANION GAP SERPL CALC-SCNC: 8 MMOL/L
BNP SERPL-MCNC: <10 PG/ML
BUN SERPL-MCNC: 17 MG/DL
CALCIUM SERPL-MCNC: 9.1 MG/DL
CHLORIDE SERPL-SCNC: 94 MMOL/L
CO2 SERPL-SCNC: 34 MMOL/L
CREAT SERPL-MCNC: 1 MG/DL
EST. GFR  (AFRICAN AMERICAN): >60 ML/MIN/1.73 M^2
EST. GFR  (NON AFRICAN AMERICAN): >60 ML/MIN/1.73 M^2
GLUCOSE SERPL-MCNC: 93 MG/DL
INR PPP: 3.5
MAGNESIUM SERPL-MCNC: 1.9 MG/DL
POTASSIUM SERPL-SCNC: 3.7 MMOL/L
PROTHROMBIN TIME: 34 SEC
SODIUM SERPL-SCNC: 136 MMOL/L

## 2018-11-12 PROCEDURE — 96376 TX/PRO/DX INJ SAME DRUG ADON: CPT

## 2018-11-12 PROCEDURE — 25000003 PHARM REV CODE 250: Performed by: NURSE PRACTITIONER

## 2018-11-12 PROCEDURE — 96366 THER/PROPH/DIAG IV INF ADDON: CPT

## 2018-11-12 PROCEDURE — 63600175 PHARM REV CODE 636 W HCPCS: Performed by: PHYSICIAN ASSISTANT

## 2018-11-12 PROCEDURE — 85610 PROTHROMBIN TIME: CPT

## 2018-11-12 PROCEDURE — 83735 ASSAY OF MAGNESIUM: CPT

## 2018-11-12 PROCEDURE — 63600175 PHARM REV CODE 636 W HCPCS: Performed by: NURSE PRACTITIONER

## 2018-11-12 PROCEDURE — 36415 COLL VENOUS BLD VENIPUNCTURE: CPT

## 2018-11-12 PROCEDURE — 80048 BASIC METABOLIC PNL TOTAL CA: CPT

## 2018-11-12 PROCEDURE — 96365 THER/PROPH/DIAG IV INF INIT: CPT

## 2018-11-12 PROCEDURE — 99213 OFFICE O/P EST LOW 20 MIN: CPT | Mod: ,,, | Performed by: INTERNAL MEDICINE

## 2018-11-12 PROCEDURE — 99499 UNLISTED E&M SERVICE: CPT | Mod: HCNC,,, | Performed by: PHYSICIAN ASSISTANT

## 2018-11-12 PROCEDURE — 83880 ASSAY OF NATRIURETIC PEPTIDE: CPT

## 2018-11-12 PROCEDURE — 25000003 PHARM REV CODE 250: Performed by: PHYSICIAN ASSISTANT

## 2018-11-12 RX ORDER — LANOLIN ALCOHOL/MO/W.PET/CERES
400 CREAM (GRAM) TOPICAL ONCE
Status: COMPLETED | OUTPATIENT
Start: 2018-11-12 | End: 2018-11-12

## 2018-11-12 RX ORDER — FUROSEMIDE 10 MG/ML
80 INJECTION INTRAMUSCULAR; INTRAVENOUS
Status: COMPLETED | OUTPATIENT
Start: 2018-11-12 | End: 2018-11-12

## 2018-11-12 RX ORDER — POTASSIUM CHLORIDE 750 MG/1
40 TABLET, EXTENDED RELEASE ORAL
Status: COMPLETED | OUTPATIENT
Start: 2018-11-12 | End: 2018-11-12

## 2018-11-12 RX ADMIN — FUROSEMIDE 20 MG/HR: 10 INJECTION, SOLUTION INTRAMUSCULAR; INTRAVENOUS at 09:11

## 2018-11-12 RX ADMIN — POTASSIUM CHLORIDE 40 MEQ: 750 TABLET, EXTENDED RELEASE ORAL at 01:11

## 2018-11-12 RX ADMIN — Medication 400 MG: at 01:11

## 2018-11-12 RX ADMIN — FUROSEMIDE 80 MG: 10 INJECTION, SOLUTION INTRAMUSCULAR; INTRAVENOUS at 08:11

## 2018-11-12 NOTE — PROGRESS NOTES
Questioned patient, reports taking correct dose  Reports eating Okra gumbo on SAT and SUN  Also states a few bruising on L hand and also forearm  NO other changes

## 2018-11-12 NOTE — PLAN OF CARE
Problem: Patient Care Overview  Goal: Plan of Care Review  Outcome: Ongoing (interventions implemented as appropriate)  Arrived from home in stable condition.Denies any acute distress at this time.Non compliant with diet this weekend.Wt up 2.7 kg from 152.9 to 155.7 kg this visit.Orders given per D Rolling PA and carried out.IV started and labs sent.Lasix 80 mg ivp followed by lasix drip @20 cc/hr x 6 hours.Will continue to monitor

## 2018-11-12 NOTE — PLAN OF CARE
Problem: Cardiac: Heart Failure (Adult)  Goal: Signs and Symptoms of Listed Potential Problems Will be Absent, Minimized or Managed (Cardiac: Heart Failure)  Signs and symptoms of listed potential problems will be absent, minimized or managed by discharge/transition of care (reference Cardiac: Heart Failure (Adult) CPG).   Outcome: Ongoing (interventions implemented as appropriate)  LUCERO BROWN here,notified of am labs and no pm labs ordered.Supplemented with kcl 40 mEq and Mag ox 400 po x 1 dose here.Reinforced medication regimen and the importance of taking all medicine as ordered per MD.Also instructed to monitor sodium and fluid intake daily during this holiday season.Tolerated infusion well.Urine output total 2625 ml with net -2035 ml.Discharge home,vss,neuro intact.Denies any acute distress/concerns at this time.Left floor in stable condition .Return in 2 weeks.Huan

## 2018-11-12 NOTE — PROGRESS NOTES
Patient advised to take (5mg) coumadin on 11/12; then maintain same dose as planned. Also to re test INR on 11/26 in infusion suite. Patient verbalized understanding.

## 2018-11-26 ENCOUNTER — ANTI-COAG VISIT (OUTPATIENT)
Dept: CARDIOLOGY | Facility: CLINIC | Age: 43
End: 2018-11-26

## 2018-11-26 ENCOUNTER — INFUSION (OUTPATIENT)
Dept: CARDIOLOGY | Facility: HOSPITAL | Age: 43
End: 2018-11-26
Attending: INTERNAL MEDICINE
Payer: MEDICARE

## 2018-11-26 VITALS
RESPIRATION RATE: 28 BRPM | SYSTOLIC BLOOD PRESSURE: 139 MMHG | WEIGHT: 315 LBS | BODY MASS INDEX: 57.07 KG/M2 | TEMPERATURE: 98 F | OXYGEN SATURATION: 93 % | DIASTOLIC BLOOD PRESSURE: 84 MMHG | HEART RATE: 84 BPM

## 2018-11-26 DIAGNOSIS — Z79.01 LONG TERM CURRENT USE OF ANTICOAGULANT THERAPY: ICD-10-CM

## 2018-11-26 DIAGNOSIS — I27.0 PRIMARY PULMONARY HYPERTENSION: Primary | ICD-10-CM

## 2018-11-26 DIAGNOSIS — I27.9 CHRONIC PULMONARY HEART DISEASE: ICD-10-CM

## 2018-11-26 LAB
ALBUMIN SERPL BCP-MCNC: 2.6 G/DL
ALP SERPL-CCNC: 109 U/L
ALT SERPL W/O P-5'-P-CCNC: 12 U/L
ANION GAP SERPL CALC-SCNC: 7 MMOL/L
AST SERPL-CCNC: 17 U/L
BILIRUB DIRECT SERPL-MCNC: 0.2 MG/DL
BILIRUB SERPL-MCNC: 0.4 MG/DL
BNP SERPL-MCNC: <10 PG/ML
BUN SERPL-MCNC: 17 MG/DL
CALCIUM SERPL-MCNC: 9 MG/DL
CHLORIDE SERPL-SCNC: 97 MMOL/L
CO2 SERPL-SCNC: 36 MMOL/L
CREAT SERPL-MCNC: 1 MG/DL
EST. GFR  (AFRICAN AMERICAN): >60 ML/MIN/1.73 M^2
EST. GFR  (NON AFRICAN AMERICAN): >60 ML/MIN/1.73 M^2
GLUCOSE SERPL-MCNC: 114 MG/DL
INR PPP: 3.4
MAGNESIUM SERPL-MCNC: 1.4 MG/DL
POTASSIUM SERPL-SCNC: 3.5 MMOL/L
PROT SERPL-MCNC: 7.9 G/DL
PROTHROMBIN TIME: 33.2 SEC
SODIUM SERPL-SCNC: 140 MMOL/L

## 2018-11-26 PROCEDURE — 25000003 PHARM REV CODE 250: Performed by: NURSE PRACTITIONER

## 2018-11-26 PROCEDURE — 99213 OFFICE O/P EST LOW 20 MIN: CPT | Mod: ,,, | Performed by: INTERNAL MEDICINE

## 2018-11-26 PROCEDURE — 63600175 PHARM REV CODE 636 W HCPCS: Performed by: NURSE PRACTITIONER

## 2018-11-26 PROCEDURE — 83735 ASSAY OF MAGNESIUM: CPT

## 2018-11-26 PROCEDURE — 36415 COLL VENOUS BLD VENIPUNCTURE: CPT

## 2018-11-26 PROCEDURE — 25000003 PHARM REV CODE 250: Performed by: PHYSICIAN ASSISTANT

## 2018-11-26 PROCEDURE — 80048 BASIC METABOLIC PNL TOTAL CA: CPT

## 2018-11-26 PROCEDURE — 85610 PROTHROMBIN TIME: CPT

## 2018-11-26 PROCEDURE — 83880 ASSAY OF NATRIURETIC PEPTIDE: CPT

## 2018-11-26 PROCEDURE — 63600175 PHARM REV CODE 636 W HCPCS: Performed by: PHYSICIAN ASSISTANT

## 2018-11-26 PROCEDURE — 99499 UNLISTED E&M SERVICE: CPT | Mod: HCNC,,, | Performed by: PHYSICIAN ASSISTANT

## 2018-11-26 PROCEDURE — 80076 HEPATIC FUNCTION PANEL: CPT

## 2018-11-26 RX ORDER — FUROSEMIDE 10 MG/ML
80 INJECTION INTRAMUSCULAR; INTRAVENOUS
Status: COMPLETED | OUTPATIENT
Start: 2018-11-26 | End: 2018-11-26

## 2018-11-26 RX ORDER — LANOLIN ALCOHOL/MO/W.PET/CERES
400 CREAM (GRAM) TOPICAL ONCE
Status: COMPLETED | OUTPATIENT
Start: 2018-11-26 | End: 2018-11-26

## 2018-11-26 RX ORDER — POTASSIUM CHLORIDE 750 MG/1
40 TABLET, EXTENDED RELEASE ORAL
Status: COMPLETED | OUTPATIENT
Start: 2018-11-26 | End: 2018-11-26

## 2018-11-26 RX ADMIN — FUROSEMIDE 80 MG: 10 INJECTION, SOLUTION INTRAMUSCULAR; INTRAVENOUS at 08:11

## 2018-11-26 RX ADMIN — Medication 400 MG: at 12:11

## 2018-11-26 RX ADMIN — FUROSEMIDE 20 MG/HR: 10 INJECTION, SOLUTION INTRAMUSCULAR; INTRAVENOUS at 08:11

## 2018-11-26 RX ADMIN — POTASSIUM CHLORIDE 40 MEQ: 750 TABLET, EXTENDED RELEASE ORAL at 12:11

## 2018-11-26 NOTE — PLAN OF CARE
Problem: Patient Care Overview  Goal: Plan of Care Review  Outcome: Ongoing (interventions implemented as appropriate)  Arrived from home in stable condition.Denies any acute distress at this time.Wt stable from 155.70 to 155.55 kg this visit.Orders given per KATHLEEN BROWN and carried out.IV started and labs sent.Lasix 80 mg ivp followed by lasix drip @20 cc/hr x 6 hours.Will continue to monitor

## 2018-11-26 NOTE — PROGRESS NOTES
Pt reports holding his own dose 11/12, will need to lower his overall dose as his INR remains elevated despite holding.  Had some bruising that has improved.  Repeat INR in 2 weeks.

## 2018-11-26 NOTE — PROGRESS NOTES
Subjective:    Patient ID:  Yong Mcintyre is a 43 y.o. male who presents for follow-up of CHF    HPI  41 yo AAM presents for biweekly outpatient IV diuretic tx. He remains on Lasix 80 mg bid, Tracleer and Revatio and is on home O2 at 3 LPM.  Sees Dr. Head at [facility] once a year.  No new complaints today. Weight is stable.    Review of Systems   Constitution: Negative for weight gain.   Cardiovascular: Positive for dyspnea on exertion. Negative for leg swelling.   Respiratory: Positive for shortness of breath.    Gastrointestinal: Negative for nausea.   Neurological: Negative for dizziness and light-headedness.        Objective:    Physical Exam   Constitutional: He is oriented to person, place, and time. He appears well-developed and well-nourished.   /78 (BP Location: Right arm, Patient Position: Sitting)   Pulse 90   Temp 98.2 °F (36.8 °C) (Oral)   Resp (!) 32   Wt (!) 152.6 kg (336 lb 5 oz)   SpO2 (!) 94%   BMI 55.97 kg/m²      Neck: JVD present.   Difficult to assess for JVD due to neck girth   Cardiovascular: Normal rate, regular rhythm and normal heart sounds.   Pulses:       Radial pulses are 2+ on the right side, and 2+ on the left side.   Pulmonary/Chest: Effort normal and breath sounds normal.   Abdominal: Soft. Bowel sounds are normal. There is no tenderness.   Musculoskeletal: He exhibits edema (1+ bilat LE edema).   Neurological: He is alert and oriented to person, place, and time.   Skin: Skin is warm and dry.         Assessment:       1. Primary pulmonary hypertension    2. Long term current use of anticoagulant therapy    3. Chronic pulmonary heart disease         Plan:       Lasix 80 mg IVP, then 20 mg/hr X 6 hours  Followed by Dr. Head at [facility].  Continue biweekly outpatient infusions.

## 2018-11-26 NOTE — PLAN OF CARE
Problem: Cardiac: Heart Failure (Adult)  Goal: Signs and Symptoms of Listed Potential Problems Will be Absent, Minimized or Managed (Cardiac: Heart Failure)  Signs and symptoms of listed potential problems will be absent, minimized or managed by discharge/transition of care (reference Cardiac: Heart Failure (Adult) CPG).   Outcome: Ongoing (interventions implemented as appropriate)  KATHLEEN BROWN here,notified of am labs and no pm labs ordered.Supplemented with Kcl 40 and Mag ox 400 mg x 1 dose.Reinforced the importance of following the medication regimen as ordered per MD.Slept most of the day.Tolerated infusion well.Urine output total 2750 ml with net -2040 ml.Discharge home,vss,neuro intact.Denies any acute distress/concerns at this timeLleft floor in stable condition.Return in 2 weeks.Huan

## 2018-11-29 NOTE — PROGRESS NOTES
Subjective:    Patient ID:  Yong Mcintyre is a 43 y.o. male who presents for follow-up of CHF    HPI    43 yo AAM presents for biweekly outpatient IV diuretic tx. He remains on Lasix 80 mg bid, Tracleer and Revatio and is on home O2 at 3 LPM.  Sees Dr. Head at [facility] once a year.  No new complaints. Weight up some today    Review of Systems   Constitution: Negative for weight gain.   Cardiovascular: Positive for dyspnea on exertion. Negative for leg swelling.   Respiratory: Positive for shortness of breath.    Gastrointestinal: Negative for nausea.   Neurological: Negative for dizziness and light-headedness.        Objective:    Physical Exam   Constitutional: He is oriented to person, place, and time. He appears well-developed and well-nourished.   /78 (BP Location: Right arm, Patient Position: Sitting)   Pulse 90   Temp 98.2 °F (36.8 °C) (Oral)   Resp (!) 32   Wt (!) 152.6 kg (336 lb 5 oz)   SpO2 (!) 94%   BMI 55.97 kg/m²      Neck: JVD present.   Difficult to assess for JVD due to neck girth   Cardiovascular: Normal rate, regular rhythm and normal heart sounds.   Pulses:       Radial pulses are 2+ on the right side, and 2+ on the left side.   Pulmonary/Chest: Effort normal and breath sounds normal.   Abdominal: Soft. Bowel sounds are normal. There is no tenderness.   Musculoskeletal: He exhibits edema (1+ bilat LE edema).   Neurological: He is alert and oriented to person, place, and time.   Skin: Skin is warm and dry.         Assessment:       1. Primary pulmonary hypertension    2. Long term current use of anticoagulant therapy         Plan:       Lasix 80 mg IVP, then 20 mg/hr X 6 hours  Followed by Dr. Head at [facility].  Continue biweekly outpatient infusions.

## 2018-12-07 DIAGNOSIS — I27.0 PRIMARY PULMONARY HYPERTENSION: Primary | ICD-10-CM

## 2018-12-10 ENCOUNTER — ANTI-COAG VISIT (OUTPATIENT)
Dept: CARDIOLOGY | Facility: CLINIC | Age: 43
End: 2018-12-10

## 2018-12-10 ENCOUNTER — INFUSION (OUTPATIENT)
Dept: CARDIOLOGY | Facility: HOSPITAL | Age: 43
End: 2018-12-10
Attending: INTERNAL MEDICINE
Payer: MEDICARE

## 2018-12-10 VITALS
TEMPERATURE: 99 F | DIASTOLIC BLOOD PRESSURE: 69 MMHG | OXYGEN SATURATION: 94 % | RESPIRATION RATE: 24 BRPM | WEIGHT: 315 LBS | SYSTOLIC BLOOD PRESSURE: 116 MMHG | BODY MASS INDEX: 56.66 KG/M2 | HEART RATE: 84 BPM

## 2018-12-10 DIAGNOSIS — I27.0 PRIMARY PULMONARY HYPERTENSION: Primary | ICD-10-CM

## 2018-12-10 DIAGNOSIS — Z79.01 LONG TERM CURRENT USE OF ANTICOAGULANT THERAPY: ICD-10-CM

## 2018-12-10 LAB
ANION GAP SERPL CALC-SCNC: 10 MMOL/L
BNP SERPL-MCNC: <10 PG/ML
BUN SERPL-MCNC: 16 MG/DL
CALCIUM SERPL-MCNC: 9.2 MG/DL
CHLORIDE SERPL-SCNC: 93 MMOL/L
CO2 SERPL-SCNC: 35 MMOL/L
CREAT SERPL-MCNC: 1 MG/DL
EST. GFR  (AFRICAN AMERICAN): >60 ML/MIN/1.73 M^2
EST. GFR  (NON AFRICAN AMERICAN): >60 ML/MIN/1.73 M^2
GLUCOSE SERPL-MCNC: 102 MG/DL
INR PPP: 2.3
MAGNESIUM SERPL-MCNC: 1.5 MG/DL
POTASSIUM SERPL-SCNC: 3.5 MMOL/L
PROTHROMBIN TIME: 22.5 SEC
SODIUM SERPL-SCNC: 138 MMOL/L

## 2018-12-10 PROCEDURE — 83880 ASSAY OF NATRIURETIC PEPTIDE: CPT

## 2018-12-10 PROCEDURE — 80048 BASIC METABOLIC PNL TOTAL CA: CPT

## 2018-12-10 PROCEDURE — 25000003 PHARM REV CODE 250: Performed by: PHYSICIAN ASSISTANT

## 2018-12-10 PROCEDURE — 63600175 PHARM REV CODE 636 W HCPCS: Performed by: PHYSICIAN ASSISTANT

## 2018-12-10 PROCEDURE — 96376 TX/PRO/DX INJ SAME DRUG ADON: CPT

## 2018-12-10 PROCEDURE — 99213 OFFICE O/P EST LOW 20 MIN: CPT | Mod: ,,, | Performed by: INTERNAL MEDICINE

## 2018-12-10 PROCEDURE — 99499 UNLISTED E&M SERVICE: CPT | Mod: HCNC,,, | Performed by: PHYSICIAN ASSISTANT

## 2018-12-10 PROCEDURE — 96366 THER/PROPH/DIAG IV INF ADDON: CPT

## 2018-12-10 PROCEDURE — 96365 THER/PROPH/DIAG IV INF INIT: CPT

## 2018-12-10 PROCEDURE — 83735 ASSAY OF MAGNESIUM: CPT

## 2018-12-10 PROCEDURE — 85610 PROTHROMBIN TIME: CPT

## 2018-12-10 RX ORDER — FUROSEMIDE 10 MG/ML
80 INJECTION INTRAMUSCULAR; INTRAVENOUS
Status: COMPLETED | OUTPATIENT
Start: 2018-12-10 | End: 2018-12-10

## 2018-12-10 RX ORDER — POTASSIUM CHLORIDE 750 MG/1
40 TABLET, EXTENDED RELEASE ORAL
Status: COMPLETED | OUTPATIENT
Start: 2018-12-10 | End: 2018-12-10

## 2018-12-10 RX ORDER — LANOLIN ALCOHOL/MO/W.PET/CERES
800 CREAM (GRAM) TOPICAL ONCE
Status: COMPLETED | OUTPATIENT
Start: 2018-12-10 | End: 2018-12-10

## 2018-12-10 RX ADMIN — FUROSEMIDE 20 MG/HR: 10 INJECTION, SOLUTION INTRAMUSCULAR; INTRAVENOUS at 08:12

## 2018-12-10 RX ADMIN — FUROSEMIDE 80 MG: 10 INJECTION, SOLUTION INTRAMUSCULAR; INTRAVENOUS at 08:12

## 2018-12-10 RX ADMIN — Medication 800 MG: at 10:12

## 2018-12-10 RX ADMIN — POTASSIUM CHLORIDE 40 MEQ: 750 TABLET, EXTENDED RELEASE ORAL at 10:12

## 2018-12-10 NOTE — PROGRESS NOTES
The pt reports that he will be going out of town for the holidays and cannot repeat an INR until 1/7.  Reschedule re-check from 12/24 to 1/7 with infusion visit.

## 2018-12-10 NOTE — PROGRESS NOTES
Subjective:    Patient ID:  Yong Mcintyre is a 43 y.o. male who presents for follow-up of CHF    HPI  43 yo AAM presents for biweekly outpatient IV diuretic tx. He remains on Lasix 80 mg bid, Tracleer and Revatio and is on home O2 at 3 LPM.  Sees Dr. Head at [facility] once a year.  No new complaints today. Weight is stable. Feels at his baseline.     Review of Systems   Constitution: Negative for weight gain.   Cardiovascular: Positive for dyspnea on exertion. Negative for leg swelling.   Respiratory: Positive for shortness of breath.    Gastrointestinal: Negative for nausea.   Neurological: Negative for dizziness and light-headedness.        Objective:    Physical Exam   Constitutional: He is oriented to person, place, and time. He appears well-developed and well-nourished.   /78 (BP Location: Right arm, Patient Position: Sitting)   Pulse 90   Temp 98.2 °F (36.8 °C) (Oral)   Resp (!) 32   Wt (!) 152.6 kg (336 lb 5 oz)   SpO2 (!) 94%   BMI 55.97 kg/m²      Neck: JVD present.   Difficult to assess for JVD due to neck girth   Cardiovascular: Normal rate, regular rhythm and normal heart sounds.   Pulses:       Radial pulses are 2+ on the right side, and 2+ on the left side.   Pulmonary/Chest: Effort normal and breath sounds normal.   Abdominal: Soft. Bowel sounds are normal. There is no tenderness.   Musculoskeletal: He exhibits edema (1+ bilat LE edema).   Neurological: He is alert and oriented to person, place, and time.   Skin: Skin is warm and dry.         Assessment:       1. Primary pulmonary hypertension    2. Long term current use of anticoagulant therapy         Plan:       Lasix 80 mg IVP, then 20 mg/hr X 6 hours  Followed by Dr. Head at [facility].  Continue biweekly outpatient infusions.

## 2018-12-10 NOTE — PLAN OF CARE
Problem: Cardiac Output Decreased (Heart Failure)  Goal: Optimal Cardiac Output  Outcome: Ongoing (interventions implemented as appropriate)  ALEXANDRE BROWN here,notified of am labs and no pm labs ordered.Supplemeted with Kcl 40 mEq and Mag ox 800 mg po x 1 dose.Pt reported he will be traveling to [place] for the Christmas holidays.Reinforced the importance of following medication regimen as ordered per MD,low sodium diet and 1500 cc fluid restriction daily.Tolerated infusion well.Urine output total 200 ml with net -.1510 ml Discharge home,vss,neuro intact.Denies any acute distress/concerns at this time.Left floor in stable condition.Return after New years.Huan

## 2018-12-10 NOTE — PLAN OF CARE
Problem: Patient Care Overview  Goal: Plan of Care Review  Outcome: Ongoing (interventions implemented as appropriate)  Arrived from home in stable condition .Denies any acute distress at this time.Wt down 1.1 kg from 155.55 to 154.45 kg this visit.Orders given per ALEXANDRE BROWN and carried out.IV started and labs sent.Lasix 80 mg ivp followed by lasix drip @ 20 cc/hr x 6 hours.Will continue to monitor

## 2018-12-18 ENCOUNTER — OFFICE VISIT (OUTPATIENT)
Dept: INTERNAL MEDICINE | Facility: CLINIC | Age: 43
End: 2018-12-18
Payer: MEDICARE

## 2018-12-18 VITALS
SYSTOLIC BLOOD PRESSURE: 129 MMHG | OXYGEN SATURATION: 89 % | WEIGHT: 315 LBS | DIASTOLIC BLOOD PRESSURE: 76 MMHG | HEART RATE: 99 BPM | BODY MASS INDEX: 52.48 KG/M2 | HEIGHT: 65 IN

## 2018-12-18 DIAGNOSIS — N63.0 BREAST LUMP: Primary | ICD-10-CM

## 2018-12-18 DIAGNOSIS — N62 GYNECOMASTIA, MALE: ICD-10-CM

## 2018-12-18 PROCEDURE — 3008F BODY MASS INDEX DOCD: CPT | Mod: CPTII,S$GLB,, | Performed by: INTERNAL MEDICINE

## 2018-12-18 PROCEDURE — 3074F SYST BP LT 130 MM HG: CPT | Mod: CPTII,S$GLB,, | Performed by: INTERNAL MEDICINE

## 2018-12-18 PROCEDURE — 99999 PR PBB SHADOW E&M-EST. PATIENT-LVL IV: CPT | Mod: PBBFAC,,, | Performed by: INTERNAL MEDICINE

## 2018-12-18 PROCEDURE — 99213 OFFICE O/P EST LOW 20 MIN: CPT | Mod: S$GLB,,, | Performed by: INTERNAL MEDICINE

## 2018-12-18 PROCEDURE — 3078F DIAST BP <80 MM HG: CPT | Mod: CPTII,S$GLB,, | Performed by: INTERNAL MEDICINE

## 2018-12-18 NOTE — PROGRESS NOTES
PAST MEDICAL HISTORY:  Pulmonary hypertension.  Hypoventilation syndrome associated with chronic respiratory failure of   hypercapnia and hypoxemia.  He is on 2 liters O2.  Left ventricular systolic and diastolic heart failure.  Obstructive sleep apnea with the use of CPAP.  Gout.  Patent foramen ovale.  Cholecystectomy.    SOCIAL HISTORY:  Tobacco use and alcohol use - none.  He does go to the gym five   days a week where he does a cardiovascular routine.    MEDICATIONS:  Advair Diskus 250/50 one puff twice a dayAllopurinol 300 mg daily.  Tracleer 125 mg twice a day.  Lasix 80 mg  twice a day.  Magnesium.  Metoprolol 25 mg one twice a day.  Coumadin.  Revatio 20 mg three times a day.  Over-the-counter potassium three a day and every two weeks, comes in for Lasix   Infusions.      REASON FOR VISIT:  This is a 43-year-old male.  Last week, he noticed a bump on   his right lateral thoracic region and feels it is more like possibly within the   breast, maybe a minimal degree of discomfort.  How he got to notice this, he was   getting himself up, felt a pulling sensation in the area and just felt it.    PAST MEDICAL HISTORY:  Outlined above.  He also has morbid obesity along with   gynecomastia.    PHYSICAL EXAMINATION:  VITAL SIGNS:  Per EPIC.  BREASTS:  Prominent gynecomastia.  What is noted in the left lateral chest wall,   which seems to be in the very lower aspect of the right breast, I can feel a   small ill-defined lump or bump.  However, when I palpate along both breasts, he   does have slight mild nodular feel.    IMPRESSION:  1.  Breast or cutaneous cyst.  2.  Gynecomastia.    PLAN:  We will arrange for a diagnostic mammogram, just continue to observe and   phone review to follow up.          /hudson [id number] review        PEYMAN/FLAVIA  dd: 12/18/2018 10:36:23 (CST)  td: 12/19/2018 01:42:44 (CST)  Doc ID   #[id number]  Job ID #[id number]    CC:

## 2019-01-04 DIAGNOSIS — I27.0 PRIMARY PULMONARY HYPERTENSION: Primary | ICD-10-CM

## 2019-01-09 ENCOUNTER — HOSPITAL ENCOUNTER (OUTPATIENT)
Dept: RADIOLOGY | Facility: HOSPITAL | Age: 44
Discharge: HOME | End: 2019-01-09
Attending: INTERNAL MEDICINE
Payer: MEDICARE

## 2019-01-09 ENCOUNTER — ANTI-COAG VISIT (OUTPATIENT)
Dept: CARDIOLOGY | Facility: CLINIC | Age: 44
End: 2019-01-09

## 2019-01-09 VITALS — BODY MASS INDEX: 52.48 KG/M2 | HEIGHT: 65 IN | WEIGHT: 315 LBS

## 2019-01-09 DIAGNOSIS — N62 GYNECOMASTIA, MALE: ICD-10-CM

## 2019-01-09 DIAGNOSIS — Z79.01 LONG TERM CURRENT USE OF ANTICOAGULANT THERAPY: ICD-10-CM

## 2019-01-09 DIAGNOSIS — N63.0 BREAST LUMP: ICD-10-CM

## 2019-01-09 PROCEDURE — 77062 MAMMO DIGITAL DIAGNOSTIC BILAT WITH TOMOSYNTHESIS_CAD: ICD-10-PCS | Mod: 26,,, | Performed by: RADIOLOGY

## 2019-01-09 PROCEDURE — 77066 MAMMO DIGITAL DIAGNOSTIC BILAT WITH TOMOSYNTHESIS_CAD: ICD-10-PCS | Mod: 26,,, | Performed by: RADIOLOGY

## 2019-01-09 PROCEDURE — 77066 DX MAMMO INCL CAD BI: CPT | Mod: TC,PO

## 2019-01-09 PROCEDURE — 77062 BREAST TOMOSYNTHESIS BI: CPT | Mod: 26,,, | Performed by: RADIOLOGY

## 2019-01-09 PROCEDURE — 77066 DX MAMMO INCL CAD BI: CPT | Mod: 26,,, | Performed by: RADIOLOGY

## 2019-01-09 PROCEDURE — 76642 ULTRASOUND BREAST LIMITED: CPT | Mod: 26,RT,, | Performed by: RADIOLOGY

## 2019-01-09 PROCEDURE — 76642 US BREAST RIGHT LIMITED: ICD-10-PCS | Mod: 26,RT,, | Performed by: RADIOLOGY

## 2019-01-09 PROCEDURE — 76642 ULTRASOUND BREAST LIMITED: CPT | Mod: TC,PO,RT

## 2019-01-17 DIAGNOSIS — I27.0 PRIMARY PULMONARY HYPERTENSION: Primary | ICD-10-CM

## 2019-01-21 ENCOUNTER — ANTI-COAG VISIT (OUTPATIENT)
Dept: CARDIOLOGY | Facility: CLINIC | Age: 44
End: 2019-01-21

## 2019-01-21 ENCOUNTER — INFUSION (OUTPATIENT)
Dept: CARDIOLOGY | Facility: HOSPITAL | Age: 44
End: 2019-01-21
Attending: INTERNAL MEDICINE
Payer: MEDICARE

## 2019-01-21 VITALS
RESPIRATION RATE: 12 BRPM | WEIGHT: 315 LBS | BODY MASS INDEX: 58.29 KG/M2 | SYSTOLIC BLOOD PRESSURE: 119 MMHG | DIASTOLIC BLOOD PRESSURE: 66 MMHG | TEMPERATURE: 99 F | OXYGEN SATURATION: 92 % | HEART RATE: 98 BPM

## 2019-01-21 DIAGNOSIS — Z79.01 LONG TERM CURRENT USE OF ANTICOAGULANT THERAPY: ICD-10-CM

## 2019-01-21 DIAGNOSIS — I27.0 PRIMARY PULMONARY HYPERTENSION: Primary | ICD-10-CM

## 2019-01-21 LAB
ANION GAP SERPL CALC-SCNC: 5 MMOL/L
BNP SERPL-MCNC: <10 PG/ML
BUN SERPL-MCNC: 17 MG/DL
CALCIUM SERPL-MCNC: 9.3 MG/DL
CHLORIDE SERPL-SCNC: 93 MMOL/L
CO2 SERPL-SCNC: 38 MMOL/L
CREAT SERPL-MCNC: 1.1 MG/DL
EST. GFR  (AFRICAN AMERICAN): >60 ML/MIN/1.73 M^2
EST. GFR  (NON AFRICAN AMERICAN): >60 ML/MIN/1.73 M^2
GLUCOSE SERPL-MCNC: 108 MG/DL
INR PPP: 2.3
MAGNESIUM SERPL-MCNC: 1.9 MG/DL
POTASSIUM SERPL-SCNC: 5 MMOL/L
PROTHROMBIN TIME: 21.9 SEC
SODIUM SERPL-SCNC: 136 MMOL/L

## 2019-01-21 PROCEDURE — 96365 THER/PROPH/DIAG IV INF INIT: CPT

## 2019-01-21 PROCEDURE — 99213 PR OFFICE/OUTPT VISIT, EST, LEVL III, 20-29 MIN: ICD-10-PCS | Mod: HCNC,,, | Performed by: INTERNAL MEDICINE

## 2019-01-21 PROCEDURE — 80048 BASIC METABOLIC PNL TOTAL CA: CPT

## 2019-01-21 PROCEDURE — 83735 ASSAY OF MAGNESIUM: CPT

## 2019-01-21 PROCEDURE — 99213 OFFICE O/P EST LOW 20 MIN: CPT | Mod: HCNC,,, | Performed by: INTERNAL MEDICINE

## 2019-01-21 PROCEDURE — 63600175 PHARM REV CODE 636 W HCPCS: Performed by: NURSE PRACTITIONER

## 2019-01-21 PROCEDURE — 96366 THER/PROPH/DIAG IV INF ADDON: CPT

## 2019-01-21 PROCEDURE — 85610 PROTHROMBIN TIME: CPT

## 2019-01-21 PROCEDURE — 25000003 PHARM REV CODE 250: Performed by: PHYSICIAN ASSISTANT

## 2019-01-21 PROCEDURE — 96376 TX/PRO/DX INJ SAME DRUG ADON: CPT

## 2019-01-21 PROCEDURE — 63600175 PHARM REV CODE 636 W HCPCS: Performed by: PHYSICIAN ASSISTANT

## 2019-01-21 PROCEDURE — 83880 ASSAY OF NATRIURETIC PEPTIDE: CPT

## 2019-01-21 RX ORDER — FUROSEMIDE 10 MG/ML
80 INJECTION INTRAMUSCULAR; INTRAVENOUS
Status: COMPLETED | OUTPATIENT
Start: 2019-01-21 | End: 2019-01-21

## 2019-01-21 RX ADMIN — FUROSEMIDE 20 MG/HR: 10 INJECTION, SOLUTION INTRAMUSCULAR; INTRAVENOUS at 08:01

## 2019-01-21 RX ADMIN — FUROSEMIDE 80 MG: 10 INJECTION, SOLUTION INTRAVENOUS at 08:01

## 2019-01-21 NOTE — PROGRESS NOTES
Pt to heart failure clinic for lasix infusion. Pt arrive via ambulation, alone, and on O2 @ 2L. Pt placed on monitor, VS obtain and stable, PIV inserted and lab collected and sent for processing. Will continue to monitor and report abn findings to midlevel or MD.

## 2019-01-31 ENCOUNTER — PES CALL (OUTPATIENT)
Dept: ADMINISTRATIVE | Facility: CLINIC | Age: 44
End: 2019-01-31

## 2019-02-01 DIAGNOSIS — I27.9 PULMONARY HEART DISEASE, CHRONIC: Primary | ICD-10-CM

## 2019-02-08 NOTE — PROGRESS NOTES
Called patient to schedule an appt. The patient stated that he could get his labs drawn on 2/18 at the infusion center. He stated that the infusion center normally draw his blood and check the inr levels there.

## 2019-02-14 DIAGNOSIS — I27.0 PRIMARY PULMONARY HYPERTENSION: Primary | ICD-10-CM

## 2019-02-18 ENCOUNTER — INFUSION (OUTPATIENT)
Dept: CARDIOLOGY | Facility: HOSPITAL | Age: 44
End: 2019-02-18
Attending: INTERNAL MEDICINE
Payer: MEDICARE

## 2019-02-18 VITALS
RESPIRATION RATE: 27 BRPM | BODY MASS INDEX: 58.02 KG/M2 | WEIGHT: 315 LBS | SYSTOLIC BLOOD PRESSURE: 115 MMHG | HEART RATE: 107 BPM | DIASTOLIC BLOOD PRESSURE: 68 MMHG

## 2019-02-18 DIAGNOSIS — I27.0 PRIMARY PULMONARY HYPERTENSION: Primary | ICD-10-CM

## 2019-02-18 DIAGNOSIS — Z79.01 LONG TERM CURRENT USE OF ANTICOAGULANT THERAPY: ICD-10-CM

## 2019-02-18 DIAGNOSIS — I27.9 CHRONIC PULMONARY HEART DISEASE: ICD-10-CM

## 2019-02-18 LAB
ALBUMIN SERPL BCP-MCNC: 3 G/DL
ALP SERPL-CCNC: 134 U/L
ALT SERPL W/O P-5'-P-CCNC: 18 U/L
ANION GAP SERPL CALC-SCNC: 7 MMOL/L
AST SERPL-CCNC: 21 U/L
BILIRUB DIRECT SERPL-MCNC: 0.2 MG/DL
BILIRUB SERPL-MCNC: 0.4 MG/DL
BNP SERPL-MCNC: <10 PG/ML
BUN SERPL-MCNC: 22 MG/DL
CALCIUM SERPL-MCNC: 9 MG/DL
CHLORIDE SERPL-SCNC: 96 MMOL/L
CO2 SERPL-SCNC: 35 MMOL/L
CREAT SERPL-MCNC: 1.2 MG/DL
EST. GFR  (AFRICAN AMERICAN): >60 ML/MIN/1.73 M^2
EST. GFR  (NON AFRICAN AMERICAN): >60 ML/MIN/1.73 M^2
GLUCOSE SERPL-MCNC: 112 MG/DL
INR PPP: 2
MAGNESIUM SERPL-MCNC: 1.8 MG/DL
POTASSIUM SERPL-SCNC: 3.8 MMOL/L
PROT SERPL-MCNC: 8.5 G/DL
PROTHROMBIN TIME: 19.8 SEC
SODIUM SERPL-SCNC: 138 MMOL/L

## 2019-02-18 PROCEDURE — 99499 UNLISTED E&M SERVICE: CPT | Mod: HCNC,,, | Performed by: NURSE PRACTITIONER

## 2019-02-18 PROCEDURE — 25000003 PHARM REV CODE 250: Mod: HCNC | Performed by: NURSE PRACTITIONER

## 2019-02-18 PROCEDURE — 99213 OFFICE O/P EST LOW 20 MIN: CPT | Mod: HCNC,,, | Performed by: NURSE PRACTITIONER

## 2019-02-18 PROCEDURE — 83735 ASSAY OF MAGNESIUM: CPT | Mod: HCNC

## 2019-02-18 PROCEDURE — 96366 THER/PROPH/DIAG IV INF ADDON: CPT | Mod: HCNC

## 2019-02-18 PROCEDURE — 96376 TX/PRO/DX INJ SAME DRUG ADON: CPT | Mod: HCNC

## 2019-02-18 PROCEDURE — 83880 ASSAY OF NATRIURETIC PEPTIDE: CPT | Mod: HCNC

## 2019-02-18 PROCEDURE — 80076 HEPATIC FUNCTION PANEL: CPT | Mod: HCNC

## 2019-02-18 PROCEDURE — 85610 PROTHROMBIN TIME: CPT | Mod: HCNC

## 2019-02-18 PROCEDURE — 80048 BASIC METABOLIC PNL TOTAL CA: CPT | Mod: HCNC

## 2019-02-18 PROCEDURE — 63600175 PHARM REV CODE 636 W HCPCS: Mod: HCNC | Performed by: NURSE PRACTITIONER

## 2019-02-18 PROCEDURE — 99213 PR OFFICE/OUTPT VISIT, EST, LEVL III, 20-29 MIN: ICD-10-PCS | Mod: HCNC,,, | Performed by: NURSE PRACTITIONER

## 2019-02-18 PROCEDURE — 96365 THER/PROPH/DIAG IV INF INIT: CPT | Mod: HCNC

## 2019-02-18 PROCEDURE — 99499 RISK ADDL DX/OHS AUDIT: ICD-10-PCS | Mod: HCNC,,, | Performed by: NURSE PRACTITIONER

## 2019-02-18 RX ORDER — FUROSEMIDE 10 MG/ML
80 INJECTION INTRAMUSCULAR; INTRAVENOUS
Status: COMPLETED | OUTPATIENT
Start: 2019-02-18 | End: 2019-02-18

## 2019-02-18 RX ADMIN — FUROSEMIDE 20 MG/HR: 10 INJECTION, SOLUTION INTRAMUSCULAR; INTRAVENOUS at 08:02

## 2019-02-18 RX ADMIN — FUROSEMIDE 80 MG: 10 INJECTION, SOLUTION INTRAMUSCULAR; INTRAVENOUS at 08:02

## 2019-02-18 NOTE — PLAN OF CARE
Problem: Fluid Imbalance (Heart Failure)  Goal: Fluid Balance  Outcome: Ongoing (interventions implemented as appropriate)  ALEXANDRE Barron NP here and notified of am labs.No pm labs ordered.Pt want to take own KCL 20 mEq po and Mag 400 mg upon arriving home.Tolerated infusion well urine output total 2650 ml with net -1750 ml.Reinforced low sodium diet with 1500 fluid restrictions daily.Left floor ambulating in stable condition.Return in 2 weeks.RHIANNON

## 2019-02-18 NOTE — PLAN OF CARE
Problem: Adult Inpatient Plan of Care  Goal: Plan of Care Review  Outcome: Ongoing (interventions implemented as appropriate)  Arrived today in stable condition.Missed several appointments and last appointment was 1/21/19.Reported feeling bloated at times.Wt remain unchanged 158.15 kg.Orders given per S Lori NP and carried out.Iv started and labs sent.lasix 80 mg ivp followed by lasix drip 20 cc/hr x 6 hours.Will continue to monitor

## 2019-02-18 NOTE — PROGRESS NOTES
Subjective:    Patient ID:  Yong Mcintyre is a 43 y.o. male who presents for follow-up of CHF    HPI  43 yo AAM presents for biweekly outpatient IV diuretic tx. He remains on Lasix 80 mg bid, Tracleer and Revatio and is on home O2 at 3 LPM.  Sees Dr. Head at [facility] once a year.  No new complaints today. Weight is stable. Feels at his baseline.     Review of Systems   Constitution: Negative for weight gain.   Cardiovascular: Positive for dyspnea on exertion. Negative for leg swelling.   Respiratory: Positive for shortness of breath.    Gastrointestinal: Negative for nausea.   Neurological: Negative for dizziness and light-headedness.        Objective:    Physical Exam   Constitutional: He is oriented to person, place, and time. He appears well-developed and well-nourished.   /78 (BP Location: Right arm, Patient Position: Sitting)   Pulse 90   Temp 98.2 °F (36.8 °C) (Oral)   Resp (!) 32   Wt (!) 152.6 kg (336 lb 5 oz)   SpO2 (!) 94%   BMI 55.97 kg/m²      Neck: JVD present.   Difficult to assess for JVD due to neck girth   Cardiovascular: Normal rate, regular rhythm and normal heart sounds.   Pulses:       Radial pulses are 2+ on the right side, and 2+ on the left side.   Pulmonary/Chest: Effort normal and breath sounds normal.   Abdominal: Soft. Bowel sounds are normal. There is no tenderness.   Musculoskeletal: He exhibits edema (1+ bilat LE edema).   Neurological: He is alert and oriented to person, place, and time.   Skin: Skin is warm and dry.         Assessment:       1. Primary pulmonary hypertension    2. Long term current use of anticoagulant therapy    3. Chronic pulmonary heart disease         Plan:       Lasix 80 mg IVP, then 20 mg/hr X 6 hours  Followed by Dr. Head at [facility].  Continue biweekly outpatient infusions.

## 2019-02-19 ENCOUNTER — ANTI-COAG VISIT (OUTPATIENT)
Dept: CARDIOLOGY | Facility: CLINIC | Age: 44
End: 2019-02-19

## 2019-02-19 DIAGNOSIS — Z79.01 LONG TERM CURRENT USE OF ANTICOAGULANT THERAPY: ICD-10-CM

## 2019-02-22 DIAGNOSIS — I27.9 CHRONIC PULMONARY HEART DISEASE: ICD-10-CM

## 2019-02-22 RX ORDER — FUROSEMIDE 80 MG/1
80 TABLET ORAL 2 TIMES DAILY
Qty: 180 TABLET | Refills: 3 | Status: SHIPPED | OUTPATIENT
Start: 2019-02-22 | End: 2020-02-27 | Stop reason: SDUPTHER

## 2019-03-03 DIAGNOSIS — I27.0 PRIMARY PULMONARY HYPERTENSION: Primary | ICD-10-CM

## 2019-03-04 ENCOUNTER — INFUSION (OUTPATIENT)
Dept: CARDIOLOGY | Facility: HOSPITAL | Age: 44
End: 2019-03-04
Attending: INTERNAL MEDICINE
Payer: MEDICARE

## 2019-03-04 VITALS
TEMPERATURE: 98 F | BODY MASS INDEX: 56.19 KG/M2 | DIASTOLIC BLOOD PRESSURE: 79 MMHG | HEART RATE: 92 BPM | WEIGHT: 315 LBS | RESPIRATION RATE: 25 BRPM | SYSTOLIC BLOOD PRESSURE: 111 MMHG

## 2019-03-04 DIAGNOSIS — Z79.01 LONG TERM CURRENT USE OF ANTICOAGULANT THERAPY: ICD-10-CM

## 2019-03-04 DIAGNOSIS — I27.0 PRIMARY PULMONARY HYPERTENSION: Primary | ICD-10-CM

## 2019-03-04 LAB
ANION GAP SERPL CALC-SCNC: 10 MMOL/L
BNP SERPL-MCNC: <10 PG/ML
BUN SERPL-MCNC: 31 MG/DL
CALCIUM SERPL-MCNC: 9.9 MG/DL
CHLORIDE SERPL-SCNC: 86 MMOL/L
CO2 SERPL-SCNC: 38 MMOL/L
CREAT SERPL-MCNC: 1.7 MG/DL
EST. GFR  (AFRICAN AMERICAN): 55.9 ML/MIN/1.73 M^2
EST. GFR  (NON AFRICAN AMERICAN): 48.3 ML/MIN/1.73 M^2
GLUCOSE SERPL-MCNC: 106 MG/DL
INR PPP: 3.3
MAGNESIUM SERPL-MCNC: 2 MG/DL
POTASSIUM SERPL-SCNC: 3.4 MMOL/L
PROTHROMBIN TIME: 32 SEC
SODIUM SERPL-SCNC: 134 MMOL/L

## 2019-03-04 PROCEDURE — 85610 PROTHROMBIN TIME: CPT | Mod: HCNC

## 2019-03-04 PROCEDURE — 83880 ASSAY OF NATRIURETIC PEPTIDE: CPT | Mod: HCNC

## 2019-03-04 PROCEDURE — 96365 THER/PROPH/DIAG IV INF INIT: CPT | Mod: HCNC

## 2019-03-04 PROCEDURE — 96366 THER/PROPH/DIAG IV INF ADDON: CPT | Mod: HCNC

## 2019-03-04 PROCEDURE — 83735 ASSAY OF MAGNESIUM: CPT | Mod: HCNC

## 2019-03-04 PROCEDURE — 99213 OFFICE O/P EST LOW 20 MIN: CPT | Mod: HCNC,,, | Performed by: INTERNAL MEDICINE

## 2019-03-04 PROCEDURE — 63600175 PHARM REV CODE 636 W HCPCS: Mod: HCNC | Performed by: NURSE PRACTITIONER

## 2019-03-04 PROCEDURE — 99213 PR OFFICE/OUTPT VISIT, EST, LEVL III, 20-29 MIN: ICD-10-PCS | Mod: HCNC,,, | Performed by: INTERNAL MEDICINE

## 2019-03-04 PROCEDURE — 99499 UNLISTED E&M SERVICE: CPT | Mod: HCNC,,, | Performed by: PHYSICIAN ASSISTANT

## 2019-03-04 PROCEDURE — 80048 BASIC METABOLIC PNL TOTAL CA: CPT | Mod: HCNC

## 2019-03-04 PROCEDURE — 25000003 PHARM REV CODE 250: Mod: HCNC | Performed by: NURSE PRACTITIONER

## 2019-03-04 PROCEDURE — 99499 RISK ADDL DX/OHS AUDIT: ICD-10-PCS | Mod: HCNC,,, | Performed by: PHYSICIAN ASSISTANT

## 2019-03-04 PROCEDURE — 25000003 PHARM REV CODE 250: Mod: HCNC | Performed by: PHYSICIAN ASSISTANT

## 2019-03-04 PROCEDURE — 96376 TX/PRO/DX INJ SAME DRUG ADON: CPT | Mod: HCNC

## 2019-03-04 RX ORDER — POTASSIUM CHLORIDE 750 MG/1
30 TABLET, EXTENDED RELEASE ORAL
Status: COMPLETED | OUTPATIENT
Start: 2019-03-04 | End: 2019-03-04

## 2019-03-04 RX ORDER — FUROSEMIDE 10 MG/ML
80 INJECTION INTRAMUSCULAR; INTRAVENOUS
Status: COMPLETED | OUTPATIENT
Start: 2019-03-04 | End: 2019-03-04

## 2019-03-04 RX ORDER — FUROSEMIDE 10 MG/ML
80 INJECTION INTRAMUSCULAR; INTRAVENOUS
Status: DISCONTINUED | OUTPATIENT
Start: 2019-03-04 | End: 2019-03-04

## 2019-03-04 RX ADMIN — POTASSIUM CHLORIDE 30 MEQ: 750 TABLET, EXTENDED RELEASE ORAL at 10:03

## 2019-03-04 RX ADMIN — FUROSEMIDE 20 MG/HR: 10 INJECTION, SOLUTION INTRAMUSCULAR; INTRAVENOUS at 08:03

## 2019-03-04 RX ADMIN — FUROSEMIDE 80 MG: 10 INJECTION, SOLUTION INTRAMUSCULAR; INTRAVENOUS at 08:03

## 2019-03-07 NOTE — PROGRESS NOTES
Subjective:    Patient ID:  Yong Mcintyre is a 43 y.o. male who presents for follow-up of CHF    HPI    41 yo AAM presents for biweekly outpatient IV diuretic tx. He remains on Lasix 80 mg bid, Tracleer and Revatio and is on home O2 at 3 LPM.  Sees Dr. Head at [facility] once a year.  Started on daily metolazone last week.    Review of Systems   Constitution: Negative for weight gain.   Cardiovascular: Positive for dyspnea on exertion. Negative for leg swelling.   Respiratory: Positive for shortness of breath.    Gastrointestinal: Negative for nausea.   Neurological: Negative for dizziness and light-headedness.        Objective:    Physical Exam   Constitutional: He is oriented to person, place, and time. He appears well-developed and well-nourished.   /78 (BP Location: Right arm, Patient Position: Sitting)   Pulse 90   Temp 98.2 °F (36.8 °C) (Oral)   Resp (!) 32   Wt (!) 152.6 kg (336 lb 5 oz)   SpO2 (!) 94%   BMI 55.97 kg/m²      Neck:   Difficult to assess for JVD due to neck girth   Cardiovascular: Normal rate, regular rhythm and normal heart sounds.   Pulses:       Radial pulses are 2+ on the right side, and 2+ on the left side.   Pulmonary/Chest: Effort normal and breath sounds normal.   Abdominal: Soft. Bowel sounds are normal. There is no tenderness.   Musculoskeletal: He exhibits edema (1+ bilat LE edema).   Neurological: He is alert and oriented to person, place, and time.   Skin: Skin is warm and dry.         Assessment:       1. Primary pulmonary hypertension    2. Long term current use of anticoagulant therapy         Plan:       -Lasix 80 mg IVP, then 20 mg/hr X 6 hours  -Followed by Dr. Head at [facility]. Recently started on metolazone daily but pt will call to clarify this dose as Cr up some today  -Continue biweekly outpatient infusions.

## 2019-03-12 NOTE — PROGRESS NOTES
Subjective:    Patient ID:  Yong Mcintyre is a 43 y.o. male who presents for follow-up of CHF    HPI  43 yo AAM presents for biweekly outpatient IV diuretic tx. He remains on Lasix 80 mg bid, Tracleer and Revatio and is on home O2 at 3 LPM.  Sees Dr. Head at [facility] once a year.  No new complaints today. Weight is stable. Feels at his baseline.     Review of Systems   Constitution: Negative for weight gain.   Cardiovascular: Positive for dyspnea on exertion. Negative for leg swelling.   Respiratory: Positive for shortness of breath.    Gastrointestinal: Negative for nausea.   Neurological: Negative for dizziness and light-headedness.        Objective:    Physical Exam   Constitutional: He is oriented to person, place, and time. He appears well-developed and well-nourished.   /78 (BP Location: Right arm, Patient Position: Sitting)   Pulse 90   Temp 98.2 °F (36.8 °C) (Oral)   Resp (!) 32   Wt (!) 152.6 kg (336 lb 5 oz)   SpO2 (!) 94%   BMI 55.97 kg/m²      Neck: JVD present.   Difficult to assess for JVD due to neck girth   Cardiovascular: Normal rate, regular rhythm and normal heart sounds.   Pulses:       Radial pulses are 2+ on the right side, and 2+ on the left side.   Pulmonary/Chest: Effort normal and breath sounds normal.   Abdominal: Soft. Bowel sounds are normal. There is no tenderness.   Musculoskeletal: He exhibits edema (1+ bilat LE edema).   Neurological: He is alert and oriented to person, place, and time.   Skin: Skin is warm and dry.         Assessment:       1. Primary pulmonary hypertension    2. Long term current use of anticoagulant therapy         Plan:       Lasix 80 mg IVP, then 20 mg/hr X 6 hours  Followed by Dr. Head at [facility].  Continue biweekly outpatient infusions.

## 2019-03-14 ENCOUNTER — TELEPHONE (OUTPATIENT)
Dept: CARDIOLOGY | Facility: HOSPITAL | Age: 44
End: 2019-03-14

## 2019-03-21 ENCOUNTER — ANTI-COAG VISIT (OUTPATIENT)
Dept: CARDIOLOGY | Facility: CLINIC | Age: 44
End: 2019-03-21

## 2019-03-21 DIAGNOSIS — Z79.01 LONG TERM CURRENT USE OF ANTICOAGULANT THERAPY: ICD-10-CM

## 2019-03-21 NOTE — PROGRESS NOTES
Pts DR found an alternate resolution for pts blood transfusions. Pt will not f/u every two r/s INR along with other appt on 3/25 at [facility] lab.

## 2019-03-24 NOTE — PROGRESS NOTES
PAST MEDICAL HISTORY:  Pulmonary hypertension.  Hypoventilation syndrome associated with chronic respiratory failure of hypercapnia and hypoxemia.  He is on 2 liters O2.  Left ventricular systolic and diastolic heart failure.  Obstructive sleep apnea with the use of CPAP.  Gout.  Patent foramen ovale.  Cholecystectomy.     SOCIAL HISTORY:  Tobacco use and alcohol use - none.  He does go to the gym five days a week where he does a cardiovascular routine.     MEDICATIONS:  Allopurinol 300 mg  Advair Diskus 250/50 one puff twice a day  Tracleer 125 mg twice a day.  Lasix 80 mg  twice a day.  Magnesium.  Metoprolol 25 mg one twice a day.  Coumadin.  Revatio 20 mg three times a day.  Over-the-counter potassium three a day and every two weeks, comes in for Lasix Infusions.          REASON FOR VISIT:  This is a 43-year-old male.  Three weeks ago on three days he   felt a discomfort in his chest like somebody brushing his chest that occurred   maybe an hour after eating and may have lasted for 5 minutes.  On a couple of   occasions after passing gas below, he felt better.  He did not feel short of   breath, was not aware of palpitations.  He was not doing any physical activity.    Around the end of February, he was started on metolazone 5 mg a day by his   Pulmonologist, but then it was stopped around March 20th.  After being told of   what happened, he actually had labs on March 4th by his cardiologist where BMP   was normal.  Basic metabolic profile was different compared to before where   potassium 3.4 and creatinine was high at 1.7.    PHYSICAL EXAMINATION:  VITAL SIGNS:  He is 335 pounds, pulse rate of 96, blood pressure 120/84.  LUNGS:  Clear.  HEART:  Tachycardia.  No murmurs or gallops.    He had an EKG, which showed a sinus tachycardia of 110 and nonspecific T-wave   abnormalities.    IMPRESSION:  1. Episode of chest pain, probably that of esophageal spasm.  2. Sinus tachycardia.  3. Pulmonary hypertension.  4.  Hypoventilation syndrome with chronic respiratory failure and he is on home   oxygen.  5. Combined ventricular heart failure.    PLAN:  Today, we will get comprehensive metabolic profile, TSH.  Reflux   precautions were discussed.  Phone review to follow up.        ROSALINA  dd: 03/25/2019 15:19:10 (CDT)  td: 03/26/2019 12:42:30 (CDT)  Doc ID   #[id number]  Job ID #[id number]    CC:

## 2019-03-25 ENCOUNTER — OFFICE VISIT (OUTPATIENT)
Dept: INTERNAL MEDICINE | Facility: CLINIC | Age: 44
End: 2019-03-25
Payer: MEDICARE

## 2019-03-25 ENCOUNTER — LAB VISIT (OUTPATIENT)
Dept: LAB | Facility: HOSPITAL | Age: 44
End: 2019-03-25
Attending: INTERNAL MEDICINE
Payer: MEDICARE

## 2019-03-25 VITALS
HEART RATE: 92 BPM | WEIGHT: 315 LBS | BODY MASS INDEX: 52.48 KG/M2 | SYSTOLIC BLOOD PRESSURE: 136 MMHG | OXYGEN SATURATION: 94 % | DIASTOLIC BLOOD PRESSURE: 84 MMHG | HEIGHT: 65 IN

## 2019-03-25 DIAGNOSIS — J96.11 CHRONIC RESPIRATORY FAILURE WITH HYPOXIA AND HYPERCAPNIA: ICD-10-CM

## 2019-03-25 DIAGNOSIS — R00.0 SINUS TACHYCARDIA: ICD-10-CM

## 2019-03-25 DIAGNOSIS — I27.20 PULMONARY HYPERTENSION: ICD-10-CM

## 2019-03-25 DIAGNOSIS — J96.12 CHRONIC RESPIRATORY FAILURE WITH HYPOXIA AND HYPERCAPNIA: ICD-10-CM

## 2019-03-25 DIAGNOSIS — I50.42 CHRONIC COMBINED SYSTOLIC AND DIASTOLIC HEART FAILURE: ICD-10-CM

## 2019-03-25 DIAGNOSIS — R07.9 CHEST PAIN, UNSPECIFIED TYPE: ICD-10-CM

## 2019-03-25 DIAGNOSIS — Z79.01 LONG TERM CURRENT USE OF ANTICOAGULANT THERAPY: ICD-10-CM

## 2019-03-25 DIAGNOSIS — R07.9 CHEST PAIN, UNSPECIFIED TYPE: Primary | ICD-10-CM

## 2019-03-25 LAB
BASOPHILS # BLD AUTO: 0.05 K/UL (ref 0–0.2)
BASOPHILS NFR BLD: 0.5 % (ref 0–1.9)
DIFFERENTIAL METHOD: ABNORMAL
EOSINOPHIL # BLD AUTO: 0.1 K/UL (ref 0–0.5)
EOSINOPHIL NFR BLD: 0.9 % (ref 0–8)
ERYTHROCYTE [DISTWIDTH] IN BLOOD BY AUTOMATED COUNT: 14 % (ref 11.5–14.5)
HCT VFR BLD AUTO: 55.5 % (ref 40–54)
HGB BLD-MCNC: 17 G/DL (ref 14–18)
IMM GRANULOCYTES # BLD AUTO: 0.02 K/UL (ref 0–0.04)
IMM GRANULOCYTES NFR BLD AUTO: 0.2 % (ref 0–0.5)
INR PPP: 2.2 (ref 0.8–1.2)
LYMPHOCYTES # BLD AUTO: 1.3 K/UL (ref 1–4.8)
LYMPHOCYTES NFR BLD: 13.4 % (ref 18–48)
MCH RBC QN AUTO: 28.3 PG (ref 27–31)
MCHC RBC AUTO-ENTMCNC: 30.6 G/DL (ref 32–36)
MCV RBC AUTO: 92 FL (ref 82–98)
MONOCYTES # BLD AUTO: 0.6 K/UL (ref 0.3–1)
MONOCYTES NFR BLD: 6.3 % (ref 4–15)
NEUTROPHILS # BLD AUTO: 7.6 K/UL (ref 1.8–7.7)
NEUTROPHILS NFR BLD: 78.7 % (ref 38–73)
NRBC BLD-RTO: 0 /100 WBC
PLATELET # BLD AUTO: 239 K/UL (ref 150–350)
PMV BLD AUTO: 11.2 FL (ref 9.2–12.9)
PROTHROMBIN TIME: 21.1 SEC (ref 9–12.5)
RBC # BLD AUTO: 6.01 M/UL (ref 4.6–6.2)
TSH SERPL DL<=0.005 MIU/L-ACNC: 2.1 UIU/ML (ref 0.4–4)
WBC # BLD AUTO: 9.68 K/UL (ref 3.9–12.7)

## 2019-03-25 PROCEDURE — 3079F DIAST BP 80-89 MM HG: CPT | Mod: HCNC,CPTII,S$GLB, | Performed by: INTERNAL MEDICINE

## 2019-03-25 PROCEDURE — 3008F PR BODY MASS INDEX (BMI) DOCUMENTED: ICD-10-PCS | Mod: HCNC,CPTII,S$GLB, | Performed by: INTERNAL MEDICINE

## 2019-03-25 PROCEDURE — 99999 PR PBB SHADOW E&M-EST. PATIENT-LVL IV: ICD-10-PCS | Mod: PBBFAC,HCNC,, | Performed by: INTERNAL MEDICINE

## 2019-03-25 PROCEDURE — 85610 PROTHROMBIN TIME: CPT | Mod: HCNC

## 2019-03-25 PROCEDURE — 99214 OFFICE O/P EST MOD 30 MIN: CPT | Mod: HCNC,S$GLB,, | Performed by: INTERNAL MEDICINE

## 2019-03-25 PROCEDURE — 99499 RISK ADDL DX/OHS AUDIT: ICD-10-PCS | Mod: HCNC,S$GLB,, | Performed by: INTERNAL MEDICINE

## 2019-03-25 PROCEDURE — 99499 UNLISTED E&M SERVICE: CPT | Mod: HCNC,,, | Performed by: INTERNAL MEDICINE

## 2019-03-25 PROCEDURE — 99499 RISK ADDL DX/OHS AUDIT: ICD-10-PCS | Mod: HCNC,,, | Performed by: INTERNAL MEDICINE

## 2019-03-25 PROCEDURE — 93010 EKG 12-LEAD: ICD-10-PCS | Mod: HCNC,S$GLB,, | Performed by: INTERNAL MEDICINE

## 2019-03-25 PROCEDURE — 99999 PR PBB SHADOW E&M-EST. PATIENT-LVL IV: CPT | Mod: PBBFAC,HCNC,, | Performed by: INTERNAL MEDICINE

## 2019-03-25 PROCEDURE — 3008F BODY MASS INDEX DOCD: CPT | Mod: HCNC,CPTII,S$GLB, | Performed by: INTERNAL MEDICINE

## 2019-03-25 PROCEDURE — 93005 ELECTROCARDIOGRAM TRACING: CPT | Mod: HCNC,S$GLB,, | Performed by: INTERNAL MEDICINE

## 2019-03-25 PROCEDURE — 93005 EKG 12-LEAD: ICD-10-PCS | Mod: HCNC,S$GLB,, | Performed by: INTERNAL MEDICINE

## 2019-03-25 PROCEDURE — 3075F PR MOST RECENT SYSTOLIC BLOOD PRESS GE 130-139MM HG: ICD-10-PCS | Mod: HCNC,CPTII,S$GLB, | Performed by: INTERNAL MEDICINE

## 2019-03-25 PROCEDURE — 93010 ELECTROCARDIOGRAM REPORT: CPT | Mod: HCNC,S$GLB,, | Performed by: INTERNAL MEDICINE

## 2019-03-25 PROCEDURE — 85025 COMPLETE CBC W/AUTO DIFF WBC: CPT | Mod: HCNC

## 2019-03-25 PROCEDURE — 99499 UNLISTED E&M SERVICE: CPT | Mod: HCNC,S$GLB,, | Performed by: INTERNAL MEDICINE

## 2019-03-25 PROCEDURE — 84443 ASSAY THYROID STIM HORMONE: CPT | Mod: HCNC

## 2019-03-25 PROCEDURE — 3079F PR MOST RECENT DIASTOLIC BLOOD PRESSURE 80-89 MM HG: ICD-10-PCS | Mod: HCNC,CPTII,S$GLB, | Performed by: INTERNAL MEDICINE

## 2019-03-25 PROCEDURE — 3075F SYST BP GE 130 - 139MM HG: CPT | Mod: HCNC,CPTII,S$GLB, | Performed by: INTERNAL MEDICINE

## 2019-03-25 PROCEDURE — 99214 PR OFFICE/OUTPT VISIT, EST, LEVL IV, 30-39 MIN: ICD-10-PCS | Mod: HCNC,S$GLB,, | Performed by: INTERNAL MEDICINE

## 2019-03-25 RX ORDER — ACETAMINOPHEN 500 MG
1 TABLET ORAL
COMMUNITY
End: 2021-03-07 | Stop reason: ALTCHOICE

## 2019-03-25 RX ORDER — METOLAZONE 2.5 MG/1
2.5 TABLET ORAL
Refills: 0 | COMMUNITY
Start: 2019-03-01 | End: 2021-03-07 | Stop reason: SDUPTHER

## 2019-03-26 ENCOUNTER — PATIENT MESSAGE (OUTPATIENT)
Dept: INTERNAL MEDICINE | Facility: CLINIC | Age: 44
End: 2019-03-26

## 2019-03-26 ENCOUNTER — ANTI-COAG VISIT (OUTPATIENT)
Dept: CARDIOLOGY | Facility: CLINIC | Age: 44
End: 2019-03-26

## 2019-03-26 DIAGNOSIS — Z79.01 LONG TERM CURRENT USE OF ANTICOAGULANT THERAPY: ICD-10-CM

## 2019-03-26 DIAGNOSIS — I50.42 CHRONIC COMBINED SYSTOLIC AND DIASTOLIC HEART FAILURE: Primary | ICD-10-CM

## 2019-03-26 DIAGNOSIS — R00.0 SINUS TACHYCARDIA: ICD-10-CM

## 2019-03-26 NOTE — PROGRESS NOTES
Labs noted as well as ECG report    Need to repeat CMP and will set 2D echo with Doppler    The high normal hemoglobin and hematocrit is secondary to chronic respiratory failure

## 2019-03-26 NOTE — TELEPHONE ENCOUNTER
Try to contact patient    Left a message on his voicemail    Need to repeat the CMP due to processing error and would also like to do a 2D echo with Doppler due to a ECG changes

## 2019-03-29 ENCOUNTER — HOSPITAL ENCOUNTER (OUTPATIENT)
Dept: CARDIOLOGY | Facility: CLINIC | Age: 44
Discharge: HOME | End: 2019-03-29
Attending: INTERNAL MEDICINE
Payer: MEDICARE

## 2019-03-29 ENCOUNTER — PATIENT MESSAGE (OUTPATIENT)
Dept: INTERNAL MEDICINE | Facility: CLINIC | Age: 44
End: 2019-03-29

## 2019-03-29 VITALS
WEIGHT: 315 LBS | HEIGHT: 65 IN | DIASTOLIC BLOOD PRESSURE: 76 MMHG | BODY MASS INDEX: 52.48 KG/M2 | HEART RATE: 78 BPM | SYSTOLIC BLOOD PRESSURE: 136 MMHG

## 2019-03-29 DIAGNOSIS — R00.0 SINUS TACHYCARDIA: ICD-10-CM

## 2019-03-29 DIAGNOSIS — I50.42 CHRONIC COMBINED SYSTOLIC AND DIASTOLIC HEART FAILURE: ICD-10-CM

## 2019-03-29 DIAGNOSIS — I50.42 CHRONIC COMBINED SYSTOLIC AND DIASTOLIC HEART FAILURE: Primary | ICD-10-CM

## 2019-03-29 LAB
ASCENDING AORTA: 2.8 CM
AV INDEX (PROSTH): 0.83
AV MEAN GRADIENT: 2.7 MMHG
AV PEAK GRADIENT: 4.84 MMHG
AV VALVE AREA: 5.12 CM2
AV VELOCITY RATIO: 0.75
BSA FOR ECHO PROCEDURE: 2.64 M2
CV ECHO LV RWT: 0.3 CM
DOP CALC AO PEAK VEL: 1.1 M/S
DOP CALC AO VTI: 19.52 CM
DOP CALC LVOT AREA: 6.15 CM2
DOP CALC LVOT DIAMETER: 2.8 CM
DOP CALC LVOT PEAK VEL: 0.83 M/S
DOP CALC LVOT STROKE VOLUME: 100.01 CM3
DOP CALCLVOT PEAK VEL VTI: 16.25 CM
E WAVE DECELERATION TIME: 183.24 MSEC
E/A RATIO: 0.9
E/E' RATIO: 3.92
ECHO LV POSTERIOR WALL: 0.85 CM (ref 0.6–1.1)
FRACTIONAL SHORTENING: 29 % (ref 28–44)
INTERVENTRICULAR SEPTUM: 0.76 CM (ref 0.6–1.1)
IVRT: 0.08 MSEC
LA MAJOR: 5.01 CM
LA MINOR: 4.83 CM
LA WIDTH: 3.57 CM
LEFT ATRIUM SIZE: 3.43 CM
LEFT ATRIUM VOLUME INDEX: 20.8 ML/M2
LEFT ATRIUM VOLUME: 51.19 CM3
LEFT INTERNAL DIMENSION IN SYSTOLE: 4.03 CM (ref 2.1–4)
LEFT VENTRICLE DIASTOLIC VOLUME INDEX: 64.34 ML/M2
LEFT VENTRICLE DIASTOLIC VOLUME: 158.47 ML
LEFT VENTRICLE MASS INDEX: 69.2 G/M2
LEFT VENTRICLE SYSTOLIC VOLUME INDEX: 28.9 ML/M2
LEFT VENTRICLE SYSTOLIC VOLUME: 71.23 ML
LEFT VENTRICULAR INTERNAL DIMENSION IN DIASTOLE: 5.68 CM (ref 3.5–6)
LEFT VENTRICULAR MASS: 170.47 G
LV LATERAL E/E' RATIO: 3.13
LV SEPTAL E/E' RATIO: 5.22
MV PEAK A VEL: 0.52 M/S
MV PEAK E VEL: 0.47 M/S
PISA TR MAX VEL: 2.05 M/S
PULM VEIN S/D RATIO: 0.65
PV PEAK D VEL: 0.52 M/S
PV PEAK S VEL: 0.34 M/S
RA MAJOR: 4.6 CM
RA PRESSURE: 3 MMHG
RA WIDTH: 4.72 CM
RIGHT VENTRICULAR END-DIASTOLIC DIMENSION: 4.7 CM
RV TISSUE DOPPLER FREE WALL SYSTOLIC VELOCITY 1 (APICAL 4 CHAMBER VIEW): 11.78 M/S
SINUS: 3.38 CM
STJ: 2.61 CM
TDI LATERAL: 0.15
TDI SEPTAL: 0.09
TDI: 0.12
TR MAX PG: 16.81 MMHG
TRICUSPID ANNULAR PLANE SYSTOLIC EXCURSION: 2.33 CM
TV REST PULMONARY ARTERY PRESSURE: 20 MMHG

## 2019-03-29 PROCEDURE — 93306 TRANSTHORACIC ECHO (TTE) COMPLETE (CUPID ONLY): ICD-10-PCS | Mod: HCNC,S$GLB,, | Performed by: INTERNAL MEDICINE

## 2019-03-29 PROCEDURE — 93306 TTE W/DOPPLER COMPLETE: CPT | Mod: HCNC,S$GLB,, | Performed by: INTERNAL MEDICINE

## 2019-03-29 RX ORDER — POTASSIUM CHLORIDE 750 MG/1
10 CAPSULE, EXTENDED RELEASE ORAL DAILY
Qty: 30 CAPSULE | Refills: 11 | Status: SHIPPED | OUTPATIENT
Start: 2019-03-29 | End: 2020-04-09 | Stop reason: SDUPTHER

## 2019-03-29 NOTE — PROGRESS NOTES
The recent lab studies reviewed    Regarding chemistry profile, liver enzymes were normal     the potassium was on the low end of normal at 3.5    Will start him on Micro-K 10 mg once a day to take the place of over-the-counter potassium which he is taking 2 a day    Repeat chemistry test in 2 weeks    Echo appears showed no change from the previous 2D echo with slightly low ejection fraction 48%    Pulmonary arterial pressure was normal    He will continue follow-up with his pulmonologist cardiologist

## 2019-03-29 NOTE — PROGRESS NOTES
Unable to reach pt w/ 3/25 results; left several messages regarding next f/u appt; scheduled pts appt on 4/8 and also mailed appt slip reminder as well

## 2019-04-02 ENCOUNTER — TELEPHONE (OUTPATIENT)
Dept: INTERNAL MEDICINE | Facility: CLINIC | Age: 44
End: 2019-04-02

## 2019-04-02 NOTE — TELEPHONE ENCOUNTER
Set up basic metabolic profile lab in 1 month    Patient scheduled for 1 month appt labs, notified.

## 2019-04-08 ENCOUNTER — ANTI-COAG VISIT (OUTPATIENT)
Dept: CARDIOLOGY | Facility: CLINIC | Age: 44
End: 2019-04-08
Payer: MEDICARE

## 2019-04-08 ENCOUNTER — LAB VISIT (OUTPATIENT)
Dept: LAB | Facility: HOSPITAL | Age: 44
End: 2019-04-08
Attending: INTERNAL MEDICINE
Payer: MEDICARE

## 2019-04-08 DIAGNOSIS — Z79.01 LONG TERM CURRENT USE OF ANTICOAGULANT THERAPY: ICD-10-CM

## 2019-04-08 LAB
INR PPP: 3.4 (ref 0.8–1.2)
PROTHROMBIN TIME: 32.6 SEC (ref 9–12.5)

## 2019-04-08 PROCEDURE — 85610 PROTHROMBIN TIME: CPT | Mod: HCNC

## 2019-04-08 PROCEDURE — 93793 PR ANTICOAGULANT MGMT FOR PT TAKING WARFARIN: ICD-10-PCS | Mod: S$GLB,,, | Performed by: PHARMACIST

## 2019-04-08 PROCEDURE — 36415 COLL VENOUS BLD VENIPUNCTURE: CPT | Mod: HCNC

## 2019-04-08 PROCEDURE — 93793 ANTICOAG MGMT PT WARFARIN: CPT | Mod: S$GLB,,, | Performed by: PHARMACIST

## 2019-04-09 NOTE — PROGRESS NOTES
Confirmed taking incorrect dose w/ 10mg everyday  Bruising located on L arm AND bleeding after wiping after a bowel movement SAT (patient advised URGENT CARE OR ER IF BLEEDING RETURNS)  NO other changes

## 2019-04-15 ENCOUNTER — LAB VISIT (OUTPATIENT)
Dept: LAB | Facility: HOSPITAL | Age: 44
End: 2019-04-15
Attending: INTERNAL MEDICINE
Payer: MEDICARE

## 2019-04-15 DIAGNOSIS — Z79.01 LONG TERM CURRENT USE OF ANTICOAGULANT THERAPY: ICD-10-CM

## 2019-04-15 LAB
INR PPP: 1.7 (ref 0.8–1.2)
PROTHROMBIN TIME: 16.5 SEC (ref 9–12.5)

## 2019-04-15 PROCEDURE — 36415 COLL VENOUS BLD VENIPUNCTURE: CPT | Mod: HCNC

## 2019-04-15 PROCEDURE — 85610 PROTHROMBIN TIME: CPT | Mod: HCNC

## 2019-04-16 ENCOUNTER — ANTI-COAG VISIT (OUTPATIENT)
Dept: CARDIOLOGY | Facility: CLINIC | Age: 44
End: 2019-04-16
Payer: MEDICARE

## 2019-04-16 DIAGNOSIS — Z79.01 LONG TERM CURRENT USE OF ANTICOAGULANT THERAPY: ICD-10-CM

## 2019-04-16 PROCEDURE — 93793 ANTICOAG MGMT PT WARFARIN: CPT | Mod: S$GLB,,, | Performed by: PHARMACIST

## 2019-04-16 PROCEDURE — 93793 PR ANTICOAGULANT MGMT FOR PT TAKING WARFARIN: ICD-10-PCS | Mod: S$GLB,,, | Performed by: PHARMACIST

## 2019-04-16 NOTE — PROGRESS NOTES
Patient denies any changes in diet, medications, or health that would effect warfarin therapy.  INR is Subtherapeutic today at 1.7. He will get an increased weekly dose of coumadin over the next week. Patient was re-educated on situations that would require placing a call to the coumadin clinic, including bleeding or unusual bruising issues, changes in health, diet or medications, upcoming procedures that require warfarin interruption, and missed coumadin dose(s). Patient expressed understanding that avoidance of consistency with these parameters could cause fluctuations in INR, leading to more frequent visits and increase risk of adverse events.

## 2019-04-22 ENCOUNTER — ANTI-COAG VISIT (OUTPATIENT)
Dept: CARDIOLOGY | Facility: CLINIC | Age: 44
End: 2019-04-22
Payer: MEDICARE

## 2019-04-22 ENCOUNTER — LAB VISIT (OUTPATIENT)
Dept: LAB | Facility: HOSPITAL | Age: 44
End: 2019-04-22
Attending: INTERNAL MEDICINE
Payer: MEDICARE

## 2019-04-22 DIAGNOSIS — Z79.01 LONG TERM CURRENT USE OF ANTICOAGULANT THERAPY: ICD-10-CM

## 2019-04-22 LAB
INR PPP: 2.3 (ref 0.8–1.2)
PROTHROMBIN TIME: 22.7 SEC (ref 9–12.5)

## 2019-04-22 PROCEDURE — 85610 PROTHROMBIN TIME: CPT | Mod: HCNC

## 2019-04-22 PROCEDURE — 93793 ANTICOAG MGMT PT WARFARIN: CPT | Mod: S$GLB,,, | Performed by: PHARMACIST

## 2019-04-22 PROCEDURE — 36415 COLL VENOUS BLD VENIPUNCTURE: CPT | Mod: HCNC

## 2019-04-22 PROCEDURE — 93793 PR ANTICOAGULANT MGMT FOR PT TAKING WARFARIN: ICD-10-PCS | Mod: S$GLB,,, | Performed by: PHARMACIST

## 2019-05-08 NOTE — PROGRESS NOTES
PAST MEDICAL HISTORY:  Pulmonary hypertension.  Hypoventilation syndrome associated with chronic respiratory failure of hypercapnia and hypoxemia.  He is on 2 liters O2.  Left ventricular systolic and diastolic heart failure.  Obstructive sleep apnea with the use of CPAP.  Gout.  Patent foramen ovale.  Cholecystectomy.     SOCIAL HISTORY:  Tobacco use and alcohol use - none.  He does go to the gym five days a week where he does a cardiovascular routine.     MEDICATIONS:  Allopurinol 300 mg  Advair Diskus 250/50 one puff twice a day  Tracleer 125 mg twice a day.  Lasix 80 mg  twice a day.  Magnesium.  Metoprolol 25 mg one twice a day.  Micro-K 10 mEq  Coumadin.  Revatio 20 mg three times a day.  every two weeks, comes in for Lasix Infusions.          REASON FOR VISIT:  This is a 43-year-old male.  For two weeks, he has been   having left-sided low back pain.  Sometimes, the pain extends down to the upper   buttocks or to the mid back.  He feels it more after waking in the morning where   it is hard to stretch out, but as the day goes on, he feels less of the   discomfort.  There are no radicular symptoms of extremities.  No abdominal pain.    No change in bowel and urine function.    PAST MEDICAL HISTORY:  Outlined above.    PHYSICAL EXAMINATION:  VITAL SIGNS:  Weight is 339 pounds and blood pressure is 120/68.  ABDOMEN:  Active bowel sounds, soft and nontender.  MUSCULOSKELETAL:  Essentially, the lumbar lordosis, currently not tender over   the left side of the back.  Trace knee and trace ankle jerk reflex.  He does   find it difficult to do a straight leg raise on the left side because of low   back pain.    ADDENDUM:  Just a note on his last visit with me, the medicine Micro-K was sent   in because of low potassium and then today, he is also requesting liver function   panel so we can get his medications for his pulmonary hypertension.    IMPRESSION:  1. Acute lumbar pain.  2. Pulmonary hypertension.  3. Combined  left ventricular dysfunction.    PLAN:  Today, we will get a hepatic function panel, BMP and urinalysis, Toradol   60 mg IM and we will call in on the prescription of methocarbamol 500 mg three   times a day for the next five days.              JAM/IN  dd: 05/09/2019 11:27:09 (CDT)  td: 05/10/2019 05:08:15 (CDT)  Doc ID   #[id number]  Job ID #[id number]    CC:

## 2019-05-09 ENCOUNTER — LAB VISIT (OUTPATIENT)
Dept: LAB | Facility: HOSPITAL | Age: 44
End: 2019-05-09
Attending: INTERNAL MEDICINE
Payer: MEDICARE

## 2019-05-09 ENCOUNTER — OFFICE VISIT (OUTPATIENT)
Dept: INTERNAL MEDICINE | Facility: CLINIC | Age: 44
End: 2019-05-09
Payer: MEDICARE

## 2019-05-09 VITALS
BODY MASS INDEX: 52.48 KG/M2 | SYSTOLIC BLOOD PRESSURE: 118 MMHG | WEIGHT: 315 LBS | HEIGHT: 65 IN | DIASTOLIC BLOOD PRESSURE: 68 MMHG | HEART RATE: 92 BPM | OXYGEN SATURATION: 92 %

## 2019-05-09 DIAGNOSIS — M54.50 LUMBAR PAIN: Primary | ICD-10-CM

## 2019-05-09 DIAGNOSIS — I50.42 CHRONIC COMBINED SYSTOLIC AND DIASTOLIC HEART FAILURE: ICD-10-CM

## 2019-05-09 DIAGNOSIS — M54.50 LUMBAR PAIN: ICD-10-CM

## 2019-05-09 DIAGNOSIS — Z79.01 LONG TERM CURRENT USE OF ANTICOAGULANT THERAPY: ICD-10-CM

## 2019-05-09 LAB
ALBUMIN SERPL BCP-MCNC: 3.1 G/DL (ref 3.5–5.2)
ALP SERPL-CCNC: 134 U/L (ref 55–135)
ALT SERPL W/O P-5'-P-CCNC: 22 U/L (ref 10–44)
ANION GAP SERPL CALC-SCNC: 8 MMOL/L (ref 8–16)
AST SERPL-CCNC: 25 U/L (ref 10–40)
BACTERIA #/AREA URNS AUTO: ABNORMAL /HPF
BILIRUB DIRECT SERPL-MCNC: 0.5 MG/DL (ref 0.1–0.3)
BILIRUB SERPL-MCNC: 0.9 MG/DL (ref 0.1–1)
BILIRUB UR QL STRIP: NEGATIVE
BUN SERPL-MCNC: 28 MG/DL (ref 6–20)
CALCIUM SERPL-MCNC: 10.1 MG/DL (ref 8.7–10.5)
CHLORIDE SERPL-SCNC: 87 MMOL/L (ref 95–110)
CLARITY UR REFRACT.AUTO: CLEAR
CO2 SERPL-SCNC: 38 MMOL/L (ref 23–29)
COLOR UR AUTO: YELLOW
CREAT SERPL-MCNC: 1.3 MG/DL (ref 0.5–1.4)
EST. GFR  (AFRICAN AMERICAN): >60 ML/MIN/1.73 M^2
EST. GFR  (NON AFRICAN AMERICAN): >60 ML/MIN/1.73 M^2
GLUCOSE SERPL-MCNC: 95 MG/DL (ref 70–110)
GLUCOSE UR QL STRIP: NEGATIVE
HGB UR QL STRIP: ABNORMAL
HYALINE CASTS UR QL AUTO: 0 /LPF
INR PPP: 2 (ref 0.8–1.2)
KETONES UR QL STRIP: NEGATIVE
LEUKOCYTE ESTERASE UR QL STRIP: NEGATIVE
MICROSCOPIC COMMENT: ABNORMAL
NITRITE UR QL STRIP: NEGATIVE
PH UR STRIP: 6 [PH] (ref 5–8)
POTASSIUM SERPL-SCNC: 3.3 MMOL/L (ref 3.5–5.1)
PROT SERPL-MCNC: 9.3 G/DL (ref 6–8.4)
PROT UR QL STRIP: ABNORMAL
PROTHROMBIN TIME: 19.8 SEC (ref 9–12.5)
RBC #/AREA URNS AUTO: 8 /HPF (ref 0–4)
SODIUM SERPL-SCNC: 133 MMOL/L (ref 136–145)
SP GR UR STRIP: 1.02 (ref 1–1.03)
SQUAMOUS #/AREA URNS AUTO: 0 /HPF
URN SPEC COLLECT METH UR: ABNORMAL
WBC #/AREA URNS AUTO: 5 /HPF (ref 0–5)

## 2019-05-09 PROCEDURE — 80076 HEPATIC FUNCTION PANEL: CPT | Mod: HCNC

## 2019-05-09 PROCEDURE — 3078F PR MOST RECENT DIASTOLIC BLOOD PRESSURE < 80 MM HG: ICD-10-PCS | Mod: HCNC,CPTII,S$GLB, | Performed by: INTERNAL MEDICINE

## 2019-05-09 PROCEDURE — 99499 RISK ADDL DX/OHS AUDIT: ICD-10-PCS | Mod: HCNC,S$GLB,, | Performed by: INTERNAL MEDICINE

## 2019-05-09 PROCEDURE — 96372 THER/PROPH/DIAG INJ SC/IM: CPT | Mod: HCNC,S$GLB,, | Performed by: INTERNAL MEDICINE

## 2019-05-09 PROCEDURE — 99499 UNLISTED E&M SERVICE: CPT | Mod: HCNC,S$GLB,, | Performed by: INTERNAL MEDICINE

## 2019-05-09 PROCEDURE — 99499 UNLISTED E&M SERVICE: CPT | Mod: HCNC,,, | Performed by: INTERNAL MEDICINE

## 2019-05-09 PROCEDURE — 3078F DIAST BP <80 MM HG: CPT | Mod: HCNC,CPTII,S$GLB, | Performed by: INTERNAL MEDICINE

## 2019-05-09 PROCEDURE — 99499 RISK ADDL DX/OHS AUDIT: ICD-10-PCS | Mod: HCNC,,, | Performed by: INTERNAL MEDICINE

## 2019-05-09 PROCEDURE — 3074F PR MOST RECENT SYSTOLIC BLOOD PRESSURE < 130 MM HG: ICD-10-PCS | Mod: HCNC,CPTII,S$GLB, | Performed by: INTERNAL MEDICINE

## 2019-05-09 PROCEDURE — 99214 PR OFFICE/OUTPT VISIT, EST, LEVL IV, 30-39 MIN: ICD-10-PCS | Mod: HCNC,25,S$GLB, | Performed by: INTERNAL MEDICINE

## 2019-05-09 PROCEDURE — 85610 PROTHROMBIN TIME: CPT | Mod: HCNC

## 2019-05-09 PROCEDURE — 99214 OFFICE O/P EST MOD 30 MIN: CPT | Mod: HCNC,25,S$GLB, | Performed by: INTERNAL MEDICINE

## 2019-05-09 PROCEDURE — 99999 PR PBB SHADOW E&M-EST. PATIENT-LVL IV: CPT | Mod: PBBFAC,HCNC,, | Performed by: INTERNAL MEDICINE

## 2019-05-09 PROCEDURE — 81001 URINALYSIS AUTO W/SCOPE: CPT | Mod: HCNC

## 2019-05-09 PROCEDURE — 3008F BODY MASS INDEX DOCD: CPT | Mod: HCNC,CPTII,S$GLB, | Performed by: INTERNAL MEDICINE

## 2019-05-09 PROCEDURE — 99999 PR PBB SHADOW E&M-EST. PATIENT-LVL IV: ICD-10-PCS | Mod: PBBFAC,HCNC,, | Performed by: INTERNAL MEDICINE

## 2019-05-09 PROCEDURE — 80048 BASIC METABOLIC PNL TOTAL CA: CPT | Mod: HCNC

## 2019-05-09 PROCEDURE — 3008F PR BODY MASS INDEX (BMI) DOCUMENTED: ICD-10-PCS | Mod: HCNC,CPTII,S$GLB, | Performed by: INTERNAL MEDICINE

## 2019-05-09 PROCEDURE — 3074F SYST BP LT 130 MM HG: CPT | Mod: HCNC,CPTII,S$GLB, | Performed by: INTERNAL MEDICINE

## 2019-05-09 PROCEDURE — 36415 COLL VENOUS BLD VENIPUNCTURE: CPT | Mod: HCNC,PO

## 2019-05-09 PROCEDURE — 96372 PR INJECTION,THERAP/PROPH/DIAG2ST, IM OR SUBCUT: ICD-10-PCS | Mod: HCNC,S$GLB,, | Performed by: INTERNAL MEDICINE

## 2019-05-09 RX ORDER — KETOROLAC TROMETHAMINE 30 MG/ML
60 INJECTION, SOLUTION INTRAMUSCULAR; INTRAVENOUS ONCE
Status: COMPLETED | OUTPATIENT
Start: 2019-05-09 | End: 2019-05-09

## 2019-05-09 RX ORDER — METHOCARBAMOL 500 MG/1
500 TABLET, FILM COATED ORAL 3 TIMES DAILY
Qty: 15 TABLET | Refills: 0 | Status: SHIPPED | OUTPATIENT
Start: 2019-05-09 | End: 2019-05-14

## 2019-05-09 RX ADMIN — KETOROLAC TROMETHAMINE 60 MG: 30 INJECTION, SOLUTION INTRAMUSCULAR; INTRAVENOUS at 11:05

## 2019-05-10 ENCOUNTER — ANTI-COAG VISIT (OUTPATIENT)
Dept: CARDIOLOGY | Facility: CLINIC | Age: 44
End: 2019-05-10
Payer: MEDICARE

## 2019-05-10 DIAGNOSIS — Z79.01 LONG TERM CURRENT USE OF ANTICOAGULANT THERAPY: ICD-10-CM

## 2019-05-10 PROCEDURE — 93793 PR ANTICOAGULANT MGMT FOR PT TAKING WARFARIN: ICD-10-PCS | Mod: S$GLB,,, | Performed by: PHARMACIST

## 2019-05-10 PROCEDURE — 93793 ANTICOAG MGMT PT WARFARIN: CPT | Mod: S$GLB,,, | Performed by: PHARMACIST

## 2019-05-10 NOTE — PROGRESS NOTES
Results discussed with patient     Labs noted      Increase potassium to 2 tablets daily    Mild microscopic hematuria noted      questionable significance    If back pain still persists , consider renal ct

## 2019-06-05 NOTE — PLAN OF CARE
EXAMINATION TYPE: XR chest 1V portable

 

DATE OF EXAM: 6/5/2019

 

CLINICAL HISTORY: Difficulty breathing progress study.  

 

TECHNIQUE: Single AP portable upright view of the chest is obtained.

 

COMPARISON: Chest x-ray from one day earlier and older studies.

 

FINDINGS:  Tracheostomy tube is redemonstrated. There is persistent bibasilar opacities on background
 chronic parenchymal change. Cardiac silhouette size is stable and mildly enlarged with atherosclerot
ic thoracic aorta. Osseous structures are intact.

 

IMPRESSION:  Overall stable findings,  mild cardiomegaly and chronic parenchymal change with persiste
nt small bilateral pleural effusions and bibasilar acute infiltrate and/or atelectasis all redemonstr
ated. Problem: Adult Inpatient Plan of Care  Goal: Plan of Care Review  Outcome: Outcome(s) achieved Date Met: 01/21/19  Pt tolerated infusion well and in NAD. Pt discharge home to self. RTC in 2 weeks.

## 2019-06-07 DIAGNOSIS — G47.33 OSA (OBSTRUCTIVE SLEEP APNEA): ICD-10-CM

## 2019-06-07 DIAGNOSIS — I27.9 CHRONIC CARDIOPULMONARY DISEASE: ICD-10-CM

## 2019-06-07 DIAGNOSIS — I27.9 CHRONIC PULMONARY HEART DISEASE: ICD-10-CM

## 2019-06-07 DIAGNOSIS — Z79.01 LONG TERM CURRENT USE OF ANTICOAGULANT THERAPY: ICD-10-CM

## 2019-06-07 DIAGNOSIS — E66.2 PICKWICKIAN SYNDROME: ICD-10-CM

## 2019-06-07 DIAGNOSIS — E66.01 MORBID OBESITY: ICD-10-CM

## 2019-06-07 DIAGNOSIS — I27.81 COR PULMONALE: ICD-10-CM

## 2019-06-07 RX ORDER — WARFARIN 10 MG/1
TABLET ORAL
Qty: 45 TABLET | Refills: 11 | Status: ON HOLD | OUTPATIENT
Start: 2019-06-07 | End: 2019-12-09 | Stop reason: SDUPTHER

## 2019-06-07 NOTE — TELEPHONE ENCOUNTER
----- Message from Mallorie Chris sent at 6/7/2019 12:37 PM CDT -----  Contact: [phone]  Type: Rx    Name of medication(s):warfarin (COUMADIN) 10 MG tablet     Is this a refill? New rx? refill    Who prescribed medication?  [facility]    Pharmacy Name, Phone, & Location:  [facility] [address] [place] [address] [facility] [address]    Comments:  Please advise, thank you

## 2019-06-10 ENCOUNTER — PATIENT MESSAGE (OUTPATIENT)
Dept: CARDIOLOGY | Facility: CLINIC | Age: 44
End: 2019-06-10

## 2019-06-10 NOTE — PROGRESS NOTES
Pt was contacted three times, the patient did not return call. Sent a missed letter through patient portal. Pt was last active on pt portal 5/19/19.

## 2019-06-11 RX ORDER — ALBUTEROL SULFATE 90 UG/1
AEROSOL, METERED RESPIRATORY (INHALATION)
Qty: 18 G | Refills: 0 | Status: ON HOLD | OUTPATIENT
Start: 2019-06-11 | End: 2019-12-09 | Stop reason: SDUPTHER

## 2019-06-26 ENCOUNTER — LAB VISIT (OUTPATIENT)
Dept: LAB | Facility: HOSPITAL | Age: 44
End: 2019-06-26
Payer: MEDICARE

## 2019-06-26 DIAGNOSIS — Z79.01 LONG TERM CURRENT USE OF ANTICOAGULANT THERAPY: ICD-10-CM

## 2019-06-26 LAB
INR PPP: 2.7 (ref 0.8–1.2)
PROTHROMBIN TIME: 26.3 SEC (ref 9–12.5)

## 2019-06-26 PROCEDURE — 36415 COLL VENOUS BLD VENIPUNCTURE: CPT | Mod: HCNC

## 2019-06-26 PROCEDURE — 85610 PROTHROMBIN TIME: CPT | Mod: HCNC

## 2019-06-27 ENCOUNTER — ANTI-COAG VISIT (OUTPATIENT)
Dept: CARDIOLOGY | Facility: CLINIC | Age: 44
End: 2019-06-27
Payer: MEDICARE

## 2019-06-27 DIAGNOSIS — Z79.01 LONG TERM CURRENT USE OF ANTICOAGULANT THERAPY: ICD-10-CM

## 2019-06-27 PROCEDURE — 93793 ANTICOAG MGMT PT WARFARIN: CPT | Mod: S$GLB,,, | Performed by: PHARMACIST

## 2019-06-27 PROCEDURE — 93793 PR ANTICOAGULANT MGMT FOR PT TAKING WARFARIN: ICD-10-PCS | Mod: S$GLB,,, | Performed by: PHARMACIST

## 2019-06-27 NOTE — PROGRESS NOTES
INR at goal. Medications and chart reviewed. No changes noted to necessitate adjustment of warfarin or follow-up plan. See calendar.    Patient was re-educated on situations that would require placing a call to the coumadin clinic, including bleeding or unusual bruising issues, changes in health, diet or medications, upcoming procedures that require warfarin interruption, and missed coumadin dose(s). Patient expressed understanding that avoidance of consistency with these parameters could cause fluctuations in INR, leading to more frequent visits and increase risk of adverse events.

## 2019-07-01 ENCOUNTER — TELEPHONE (OUTPATIENT)
Dept: INTERNAL MEDICINE | Facility: CLINIC | Age: 44
End: 2019-07-01

## 2019-07-01 DIAGNOSIS — R79.89 ABNORMAL LIVER FUNCTION TEST: Primary | ICD-10-CM

## 2019-07-01 NOTE — TELEPHONE ENCOUNTER
----- Message from Jhoana Angel sent at 7/1/2019  3:41 PM CDT -----  Contact: Patient [phone]  Type: Orders Request    What orders/ testing are being requested?Liver Function    Is there a future appointment scheduled for the patient with PCP?No    When?N/A    Would you prefer a response via [employer]?No    Comments:Please put in orders and advise.      Thanks

## 2019-07-03 ENCOUNTER — LAB VISIT (OUTPATIENT)
Dept: LAB | Facility: HOSPITAL | Age: 44
End: 2019-07-03
Attending: INTERNAL MEDICINE
Payer: MEDICARE

## 2019-07-03 DIAGNOSIS — R79.89 ABNORMAL LIVER FUNCTION TEST: ICD-10-CM

## 2019-07-03 LAB
ALBUMIN SERPL BCP-MCNC: 3.1 G/DL (ref 3.5–5.2)
ALP SERPL-CCNC: 125 U/L (ref 55–135)
ALT SERPL W/O P-5'-P-CCNC: 24 U/L (ref 10–44)
AST SERPL-CCNC: 31 U/L (ref 10–40)
BILIRUB DIRECT SERPL-MCNC: 0.3 MG/DL (ref 0.1–0.3)
BILIRUB SERPL-MCNC: 0.6 MG/DL (ref 0.1–1)
PROT SERPL-MCNC: 8.9 G/DL (ref 6–8.4)

## 2019-07-03 PROCEDURE — 80076 HEPATIC FUNCTION PANEL: CPT | Mod: HCNC

## 2019-07-03 PROCEDURE — 36415 COLL VENOUS BLD VENIPUNCTURE: CPT | Mod: HCNC

## 2019-08-05 ENCOUNTER — TELEPHONE (OUTPATIENT)
Dept: INTERNAL MEDICINE | Facility: CLINIC | Age: 44
End: 2019-08-05

## 2019-08-05 NOTE — TELEPHONE ENCOUNTER
Pt states he want to check his bmp every month he has to check his liver he usually get it through  Infusion but they release him because he was doing better, he would like to continue to check it

## 2019-08-05 NOTE — TELEPHONE ENCOUNTER
----- Message from Nickolas Ang sent at 8/5/2019  1:04 PM CDT -----  Contact: Self [phone]  Patient is requesting an Basic Metabolic Panel (BMP)

## 2019-08-14 ENCOUNTER — TELEPHONE (OUTPATIENT)
Dept: INTERNAL MEDICINE | Facility: CLINIC | Age: 44
End: 2019-08-14

## 2019-08-14 ENCOUNTER — PES CALL (OUTPATIENT)
Dept: ADMINISTRATIVE | Facility: CLINIC | Age: 44
End: 2019-08-14

## 2019-08-14 DIAGNOSIS — I10 ESSENTIAL HYPERTENSION: Primary | ICD-10-CM

## 2019-08-14 NOTE — PROGRESS NOTES
Patient called to reschedule today's lab appointment--states feeling under the weather--not taking any new meds or antibiotics, appointment was rescheduled to 8/15/19

## 2019-08-14 NOTE — TELEPHONE ENCOUNTER
----- Message from Cathy Wagner sent at 8/14/2019  3:13 PM CDT -----  Contact: self/[phone]  Patient called in regards needing to talk with someone in Dr Escalona's office about his liver function test lab. Patient stated that he called a week ago and has not receive an call back. please call and advise. Thank you !!!

## 2019-08-15 ENCOUNTER — LAB VISIT (OUTPATIENT)
Dept: LAB | Facility: HOSPITAL | Age: 44
End: 2019-08-15
Attending: INTERNAL MEDICINE
Payer: MEDICARE

## 2019-08-15 ENCOUNTER — ANTI-COAG VISIT (OUTPATIENT)
Dept: CARDIOLOGY | Facility: CLINIC | Age: 44
End: 2019-08-15
Payer: MEDICARE

## 2019-08-15 DIAGNOSIS — I10 ESSENTIAL HYPERTENSION: ICD-10-CM

## 2019-08-15 DIAGNOSIS — Z79.01 LONG TERM CURRENT USE OF ANTICOAGULANT THERAPY: ICD-10-CM

## 2019-08-15 LAB
ANION GAP SERPL CALC-SCNC: 8 MMOL/L (ref 8–16)
BUN SERPL-MCNC: 28 MG/DL (ref 6–20)
CALCIUM SERPL-MCNC: 8.8 MG/DL (ref 8.7–10.5)
CHLORIDE SERPL-SCNC: 91 MMOL/L (ref 95–110)
CO2 SERPL-SCNC: 41 MMOL/L (ref 23–29)
CREAT SERPL-MCNC: 1.3 MG/DL (ref 0.5–1.4)
EST. GFR  (AFRICAN AMERICAN): >60 ML/MIN/1.73 M^2
EST. GFR  (NON AFRICAN AMERICAN): >60 ML/MIN/1.73 M^2
GLUCOSE SERPL-MCNC: 91 MG/DL (ref 70–110)
INR PPP: 2.2 (ref 0.8–1.2)
POTASSIUM SERPL-SCNC: 3.3 MMOL/L (ref 3.5–5.1)
PROTHROMBIN TIME: 21.3 SEC (ref 9–12.5)
SODIUM SERPL-SCNC: 140 MMOL/L (ref 136–145)

## 2019-08-15 PROCEDURE — 93793 PR ANTICOAGULANT MGMT FOR PT TAKING WARFARIN: ICD-10-PCS | Mod: S$GLB,,, | Performed by: PHARMACIST

## 2019-08-15 PROCEDURE — 80048 BASIC METABOLIC PNL TOTAL CA: CPT | Mod: HCNC

## 2019-08-15 PROCEDURE — 93793 ANTICOAG MGMT PT WARFARIN: CPT | Mod: S$GLB,,, | Performed by: PHARMACIST

## 2019-08-15 PROCEDURE — 36415 COLL VENOUS BLD VENIPUNCTURE: CPT | Mod: HCNC

## 2019-08-15 PROCEDURE — 85610 PROTHROMBIN TIME: CPT | Mod: HCNC

## 2019-08-15 NOTE — TELEPHONE ENCOUNTER
----- Message from Patricia Hernandez sent at 8/15/2019  1:14 PM CDT -----  Contact: Patient [phone]  Patient stated that he missed a call from you. Requesting that you call him today.    Please call and advise.    Thank You

## 2019-08-15 NOTE — TELEPHONE ENCOUNTER
----- Message from Patricia Hernandez sent at 8/15/2019  1:14 PM CDT -----  Contact: Patient [phone]  Patient stated that he missed a call from you. Requesting that you call him today.    Please call and advise.    Thank You

## 2019-08-18 DIAGNOSIS — I27.81 COR PULMONALE: ICD-10-CM

## 2019-08-18 DIAGNOSIS — I48.0 PAROXYSMAL ATRIAL FIBRILLATION: ICD-10-CM

## 2019-08-18 DIAGNOSIS — I48.91 ATRIAL FIBRILLATION, UNSPECIFIED TYPE: ICD-10-CM

## 2019-08-18 DIAGNOSIS — E05.90 HYPERTHYROIDISM: ICD-10-CM

## 2019-08-18 DIAGNOSIS — I27.9 CHRONIC PULMONARY HEART DISEASE: ICD-10-CM

## 2019-08-18 DIAGNOSIS — I27.20 PULMONARY HYPERTENSION: ICD-10-CM

## 2019-08-18 DIAGNOSIS — E66.2 PICKWICKIAN SYNDROME: ICD-10-CM

## 2019-08-18 DIAGNOSIS — Q21.12 PFO (PATENT FORAMEN OVALE): ICD-10-CM

## 2019-08-18 DIAGNOSIS — I27.0 PRIMARY PULMONARY HYPERTENSION: ICD-10-CM

## 2019-08-21 ENCOUNTER — PATIENT MESSAGE (OUTPATIENT)
Dept: INTERNAL MEDICINE | Facility: CLINIC | Age: 44
End: 2019-08-21

## 2019-08-21 DIAGNOSIS — E66.2 PICKWICKIAN SYNDROME: ICD-10-CM

## 2019-08-21 DIAGNOSIS — I27.0 PRIMARY PULMONARY HYPERTENSION: ICD-10-CM

## 2019-08-21 DIAGNOSIS — I27.9 CHRONIC PULMONARY HEART DISEASE: ICD-10-CM

## 2019-08-21 DIAGNOSIS — I27.81 COR PULMONALE: ICD-10-CM

## 2019-08-21 DIAGNOSIS — Q21.12 PFO (PATENT FORAMEN OVALE): ICD-10-CM

## 2019-08-21 DIAGNOSIS — E05.90 HYPERTHYROIDISM: ICD-10-CM

## 2019-08-21 DIAGNOSIS — I48.0 PAROXYSMAL ATRIAL FIBRILLATION: ICD-10-CM

## 2019-08-21 DIAGNOSIS — I48.91 ATRIAL FIBRILLATION, UNSPECIFIED TYPE: ICD-10-CM

## 2019-08-21 DIAGNOSIS — I27.20 PULMONARY HYPERTENSION: ICD-10-CM

## 2019-08-21 RX ORDER — ALLOPURINOL 300 MG/1
300 TABLET ORAL DAILY
Qty: 90 TABLET | Refills: 3
Start: 2019-08-21 | End: 2020-02-27 | Stop reason: SDUPTHER

## 2019-08-22 RX ORDER — ALLOPURINOL 300 MG/1
TABLET ORAL
Qty: 90 TABLET | Refills: 0 | Status: SHIPPED | OUTPATIENT
Start: 2019-08-22 | End: 2019-11-16 | Stop reason: SDUPTHER

## 2019-08-22 RX ORDER — ALLOPURINOL 300 MG/1
TABLET ORAL
Qty: 90 TABLET | Refills: 0 | Status: SHIPPED | OUTPATIENT
Start: 2019-08-22 | End: 2019-12-07 | Stop reason: CLARIF

## 2019-09-09 DIAGNOSIS — I27.9 CHRONIC PULMONARY HEART DISEASE: ICD-10-CM

## 2019-09-09 RX ORDER — ALBUTEROL SULFATE 90 UG/1
AEROSOL, METERED RESPIRATORY (INHALATION)
Qty: 18 G | Refills: 0 | Status: SHIPPED | OUTPATIENT
Start: 2019-09-09 | End: 2019-11-10 | Stop reason: SDUPTHER

## 2019-09-10 ENCOUNTER — TELEPHONE (OUTPATIENT)
Dept: INTERNAL MEDICINE | Facility: CLINIC | Age: 44
End: 2019-09-10

## 2019-09-10 DIAGNOSIS — I27.20 PULMONARY HYPERTENSION: Primary | ICD-10-CM

## 2019-09-10 NOTE — TELEPHONE ENCOUNTER
The order is an but find out why is he requesting this or is thus being requested by his other physicians

## 2019-09-10 NOTE — TELEPHONE ENCOUNTER
----- Message from Patricia Hernandez sent at 9/10/2019  1:30 PM CDT -----  Contact: Patient [phone]  Requesting to have a liver function test done.    Please call and advise.    Thank You

## 2019-09-17 ENCOUNTER — PATIENT MESSAGE (OUTPATIENT)
Dept: INTERNAL MEDICINE | Facility: CLINIC | Age: 44
End: 2019-09-17

## 2019-09-17 ENCOUNTER — ANTI-COAG VISIT (OUTPATIENT)
Dept: CARDIOLOGY | Facility: CLINIC | Age: 44
End: 2019-09-17
Payer: MEDICARE

## 2019-09-17 ENCOUNTER — LAB VISIT (OUTPATIENT)
Dept: LAB | Facility: HOSPITAL | Age: 44
End: 2019-09-17
Attending: INTERNAL MEDICINE
Payer: MEDICARE

## 2019-09-17 DIAGNOSIS — Z79.01 LONG TERM CURRENT USE OF ANTICOAGULANT THERAPY: ICD-10-CM

## 2019-09-17 DIAGNOSIS — I27.20 PULMONARY HYPERTENSION: ICD-10-CM

## 2019-09-17 DIAGNOSIS — R79.9 ABNORMAL SERUM TOTAL PROTEIN LEVEL: Primary | ICD-10-CM

## 2019-09-17 LAB
ALBUMIN SERPL BCP-MCNC: 3 G/DL (ref 3.5–5.2)
ALP SERPL-CCNC: 133 U/L (ref 55–135)
ALT SERPL W/O P-5'-P-CCNC: 25 U/L (ref 10–44)
AST SERPL-CCNC: 27 U/L (ref 10–40)
BILIRUB DIRECT SERPL-MCNC: 0.2 MG/DL (ref 0.1–0.3)
BILIRUB SERPL-MCNC: 0.4 MG/DL (ref 0.1–1)
INR PPP: 4 (ref 0.8–1.2)
PROT SERPL-MCNC: 9 G/DL (ref 6–8.4)
PROTHROMBIN TIME: 38.8 SEC (ref 9–12.5)

## 2019-09-17 PROCEDURE — 80076 HEPATIC FUNCTION PANEL: CPT | Mod: HCNC

## 2019-09-17 PROCEDURE — 93793 PR ANTICOAGULANT MGMT FOR PT TAKING WARFARIN: ICD-10-PCS | Mod: S$GLB,,, | Performed by: PHARMACIST

## 2019-09-17 PROCEDURE — 93793 ANTICOAG MGMT PT WARFARIN: CPT | Mod: S$GLB,,, | Performed by: PHARMACIST

## 2019-09-17 PROCEDURE — 36415 COLL VENOUS BLD VENIPUNCTURE: CPT | Mod: HCNC

## 2019-09-17 PROCEDURE — 85610 PROTHROMBIN TIME: CPT | Mod: HCNC

## 2019-09-17 NOTE — PROGRESS NOTES
The liver enzymes were fine    However noted was an elevated protein and because of such I would like to do a special protein blood test    My assistant will be calling you regarding this

## 2019-09-18 NOTE — PROGRESS NOTES
Confirmed taking incorrect dose of coumadin w/ 15mg on MON & 10mg ALL others; advised pt to HOLD dose on 9/17  Reports taking OTC tylonol w/ arthritis recently & Z-quil recently   NO other changes

## 2019-09-18 NOTE — PROGRESS NOTES
Pt now reports going out of town on 9/27-10/?;  He requests schedule f/u on  9/26 &  Pt confirmed having a 10mg tab, instead of 5mg*

## 2019-09-26 ENCOUNTER — ANTI-COAG VISIT (OUTPATIENT)
Dept: CARDIOLOGY | Facility: CLINIC | Age: 44
End: 2019-09-26
Payer: MEDICARE

## 2019-09-26 ENCOUNTER — LAB VISIT (OUTPATIENT)
Dept: LAB | Facility: HOSPITAL | Age: 44
End: 2019-09-26
Attending: INTERNAL MEDICINE
Payer: MEDICARE

## 2019-09-26 DIAGNOSIS — Z79.01 LONG TERM CURRENT USE OF ANTICOAGULANT THERAPY: ICD-10-CM

## 2019-09-26 DIAGNOSIS — R79.9 ABNORMAL SERUM TOTAL PROTEIN LEVEL: ICD-10-CM

## 2019-09-26 LAB
INR PPP: 3.3 (ref 0.8–1.2)
PROTHROMBIN TIME: 32.1 SEC (ref 9–12.5)

## 2019-09-26 PROCEDURE — 84165 PROTEIN E-PHORESIS SERUM: CPT | Mod: HCNC

## 2019-09-26 PROCEDURE — 84165 PATHOLOGIST INTERPRETATION SPE: ICD-10-PCS | Mod: 26,HCNC,, | Performed by: PATHOLOGY

## 2019-09-26 PROCEDURE — 36415 COLL VENOUS BLD VENIPUNCTURE: CPT | Mod: HCNC

## 2019-09-26 PROCEDURE — 85610 PROTHROMBIN TIME: CPT | Mod: HCNC

## 2019-09-26 PROCEDURE — 84165 PROTEIN E-PHORESIS SERUM: CPT | Mod: 26,HCNC,, | Performed by: PATHOLOGY

## 2019-09-26 PROCEDURE — 93793 PR ANTICOAGULANT MGMT FOR PT TAKING WARFARIN: ICD-10-PCS | Mod: S$GLB,,, | Performed by: PHARMACIST

## 2019-09-26 PROCEDURE — 93793 ANTICOAG MGMT PT WARFARIN: CPT | Mod: S$GLB,,, | Performed by: PHARMACIST

## 2019-09-27 LAB
ALBUMIN SERPL ELPH-MCNC: 3.65 G/DL (ref 3.35–5.55)
ALPHA1 GLOB SERPL ELPH-MCNC: 0.5 G/DL (ref 0.17–0.41)
ALPHA2 GLOB SERPL ELPH-MCNC: 0.81 G/DL (ref 0.43–0.99)
B-GLOBULIN SERPL ELPH-MCNC: 1.18 G/DL (ref 0.5–1.1)
GAMMA GLOB SERPL ELPH-MCNC: 2.66 G/DL (ref 0.67–1.58)
PATHOLOGIST INTERPRETATION SPE: NORMAL
PROT SERPL-MCNC: 8.8 G/DL (ref 6–8.4)

## 2019-09-27 NOTE — PROGRESS NOTES
Confirmed taking correct dose of coumadin;   Except on 9/26, pt took 5mg;    Also pt states he currently Out of town 9/27-10/12  Reports NO neew changes

## 2019-10-15 ENCOUNTER — HOSPITAL ENCOUNTER (EMERGENCY)
Facility: HOSPITAL | Age: 44
Discharge: HOME | End: 2019-10-16
Attending: EMERGENCY MEDICINE
Payer: MEDICARE

## 2019-10-15 ENCOUNTER — NURSE TRIAGE (OUTPATIENT)
Dept: ADMINISTRATIVE | Facility: CLINIC | Age: 44
End: 2019-10-15

## 2019-10-15 VITALS
OXYGEN SATURATION: 94 % | RESPIRATION RATE: 20 BRPM | HEIGHT: 65 IN | HEART RATE: 96 BPM | TEMPERATURE: 99 F | DIASTOLIC BLOOD PRESSURE: 58 MMHG | BODY MASS INDEX: 52.48 KG/M2 | WEIGHT: 315 LBS | SYSTOLIC BLOOD PRESSURE: 128 MMHG

## 2019-10-15 DIAGNOSIS — R07.9 CHEST PAIN: ICD-10-CM

## 2019-10-15 DIAGNOSIS — R07.9 CHEST PAIN, UNSPECIFIED TYPE: Primary | ICD-10-CM

## 2019-10-15 LAB
ALBUMIN SERPL BCP-MCNC: 3.2 G/DL (ref 3.5–5.2)
ALP SERPL-CCNC: 123 U/L (ref 55–135)
ALT SERPL W/O P-5'-P-CCNC: 16 U/L (ref 10–44)
ANION GAP SERPL CALC-SCNC: 11 MMOL/L (ref 8–16)
AST SERPL-CCNC: 22 U/L (ref 10–40)
BASOPHILS # BLD AUTO: 0.04 K/UL (ref 0–0.2)
BASOPHILS NFR BLD: 0.4 % (ref 0–1.9)
BILIRUB SERPL-MCNC: 0.8 MG/DL (ref 0.1–1)
BNP SERPL-MCNC: <10 PG/ML (ref 0–99)
BUN SERPL-MCNC: 24 MG/DL (ref 6–20)
CALCIUM SERPL-MCNC: 9.3 MG/DL (ref 8.7–10.5)
CHLORIDE SERPL-SCNC: 92 MMOL/L (ref 95–110)
CO2 SERPL-SCNC: 35 MMOL/L (ref 23–29)
CREAT SERPL-MCNC: 1.4 MG/DL (ref 0.5–1.4)
DIFFERENTIAL METHOD: ABNORMAL
EOSINOPHIL # BLD AUTO: 0.5 K/UL (ref 0–0.5)
EOSINOPHIL NFR BLD: 4.4 % (ref 0–8)
ERYTHROCYTE [DISTWIDTH] IN BLOOD BY AUTOMATED COUNT: 15.7 % (ref 11.5–14.5)
EST. GFR  (AFRICAN AMERICAN): >60 ML/MIN/1.73 M^2
EST. GFR  (NON AFRICAN AMERICAN): >60 ML/MIN/1.73 M^2
GLUCOSE SERPL-MCNC: 105 MG/DL (ref 70–110)
HCT VFR BLD AUTO: 50.6 % (ref 40–54)
HGB BLD-MCNC: 15.8 G/DL (ref 14–18)
IMM GRANULOCYTES # BLD AUTO: 0.03 K/UL (ref 0–0.04)
IMM GRANULOCYTES NFR BLD AUTO: 0.3 % (ref 0–0.5)
LYMPHOCYTES # BLD AUTO: 1.7 K/UL (ref 1–4.8)
LYMPHOCYTES NFR BLD: 16.6 % (ref 18–48)
MCH RBC QN AUTO: 28.8 PG (ref 27–31)
MCHC RBC AUTO-ENTMCNC: 31.2 G/DL (ref 32–36)
MCV RBC AUTO: 92 FL (ref 82–98)
MONOCYTES # BLD AUTO: 0.6 K/UL (ref 0.3–1)
MONOCYTES NFR BLD: 5.8 % (ref 4–15)
NEUTROPHILS # BLD AUTO: 7.5 K/UL (ref 1.8–7.7)
NEUTROPHILS NFR BLD: 72.5 % (ref 38–73)
NRBC BLD-RTO: 0 /100 WBC
PLATELET # BLD AUTO: 218 K/UL (ref 150–350)
PMV BLD AUTO: 10.1 FL (ref 9.2–12.9)
POTASSIUM SERPL-SCNC: 3.3 MMOL/L (ref 3.5–5.1)
PROT SERPL-MCNC: 9.2 G/DL (ref 6–8.4)
RBC # BLD AUTO: 5.49 M/UL (ref 4.6–6.2)
SODIUM SERPL-SCNC: 138 MMOL/L (ref 136–145)
TROPONIN I SERPL DL<=0.01 NG/ML-MCNC: <0.006 NG/ML (ref 0–0.03)
WBC # BLD AUTO: 10.31 K/UL (ref 3.9–12.7)

## 2019-10-15 PROCEDURE — 83880 ASSAY OF NATRIURETIC PEPTIDE: CPT | Mod: HCNC

## 2019-10-15 PROCEDURE — 99285 EMERGENCY DEPT VISIT HI MDM: CPT | Mod: HCNC,,, | Performed by: EMERGENCY MEDICINE

## 2019-10-15 PROCEDURE — 93010 EKG 12-LEAD: ICD-10-PCS | Mod: HCNC,,, | Performed by: INTERNAL MEDICINE

## 2019-10-15 PROCEDURE — 93005 ELECTROCARDIOGRAM TRACING: CPT | Mod: HCNC

## 2019-10-15 PROCEDURE — 84484 ASSAY OF TROPONIN QUANT: CPT | Mod: HCNC

## 2019-10-15 PROCEDURE — 93010 ELECTROCARDIOGRAM REPORT: CPT | Mod: HCNC,,, | Performed by: INTERNAL MEDICINE

## 2019-10-15 PROCEDURE — 99285 EMERGENCY DEPT VISIT HI MDM: CPT | Mod: 25,HCNC

## 2019-10-15 PROCEDURE — 80053 COMPREHEN METABOLIC PANEL: CPT | Mod: HCNC

## 2019-10-15 PROCEDURE — 85025 COMPLETE CBC W/AUTO DIFF WBC: CPT | Mod: HCNC

## 2019-10-15 PROCEDURE — 99285 PR EMERGENCY DEPT VISIT,LEVEL V: ICD-10-PCS | Mod: HCNC,,, | Performed by: EMERGENCY MEDICINE

## 2019-10-15 RX ORDER — POTASSIUM CHLORIDE 20 MEQ/15ML
20 SOLUTION ORAL
Status: DISCONTINUED | OUTPATIENT
Start: 2019-10-15 | End: 2019-10-16 | Stop reason: HOSPADM

## 2019-10-15 NOTE — TELEPHONE ENCOUNTER
Reason for Disposition   Recent long-distance travel with prolonged time in car, bus, plane, or train (i.e., within past 2 weeks; 6 or more hours duration)    Additional Information   Negative: Severe difficulty breathing (e.g., struggling for each breath, speaks in single words)   Negative: Passed out (i.e., fainted, collapsed and was not responding)   Negative: Chest pain lasting longer than 5 minutes and ANY of the following:* Over 50 years old* Over 30 years old and at least one cardiac risk factor (i.e., high blood pressure, diabetes, high cholesterol, obesity, smoker or strong family history of heart disease)* Pain is crushing, pressure-like, or heavy * Took nitroglycerin and chest pain was not relieved* History of heart disease (i.e., angina, heart attack, bypass surgery, angioplasty, CHF)   Negative: Visible sweat on face or sweat dripping down face   Negative: Sounds like a life-threatening emergency to the triager   Negative: Followed an injury to chest   Negative: SEVERE chest pain   Negative: Pain also present in shoulder(s) or arm(s) or jaw   Negative: Difficulty breathing   Negative: Cocaine use within last 3 days   Negative: History of prior 'blood clot' in leg or lungs (i.e., deep vein thrombosis, pulmonary embolism)   Negative: Recent illness requiring prolonged bed rest (i.e., immobilization)   Negative: Hip or leg fracture in past 2 months (e.g, or had cast on leg or ankle)   Negative: Major surgery in the past month    Protocols used: CHEST PAIN-A-OH  CP today when woke up. Just came off trip. Ate pizza last pm. pt sleeps with CPAP- unclear if clean. Pt reads a lot online. Tried gasx. Some pain did subside. No SOB, no CP. pain above nipple line. pain mainly today had pain. drove to [place] at end of sept drove back yest. rec local ED. call back with questions.

## 2019-10-15 NOTE — ED PROVIDER NOTES
"Encounter Date: 10/15/2019    SCRIBE #1 NOTE: I, Natasha Hanang, am scribing for, and in the presence of,  Yadi Lucia MD. I have scribed the entire note.       History     Chief Complaint   Patient presents with    Chest Pain     on home oxygen, pain free,      Time patient was seen by the provider: 5:29 PM      The patient is a 44 y.o. male with co-morbidities including: pulmonary HTN, left ventricular systolic dysfunction, patent foramen ovale, CHF, who presents to the ED with a complaint of intermittent chest pain that started around 12 PM. He reports after taking GasX, he belched multiple times and his chest pain resolved. He reports his chest pain lasted 30 minutes to an hour. Pain is describe as "a cold in my chest" and is exacerbated with deep inspiration. Patient reports pain was similar when he had "a cold in my chest" secondary to not cleaning his CPAP machine.  He reports he coughed once during this episode which intensified his pain. Denies worsening shortness of breath, diaphoresis, or nausea during this episode. He reports he has not had a decrease in activity since his chest pain has resolved. Patient reports recently driving back from [place] yesterday afternoon. He reports while he was there, he was more active than normal but was able to ambulate without worsening of shortness of breath. He reports he had [employer] and experienced some bloating without chest pain yesterday night. Denies fever, chills, numbness, or any other associated symptoms.     The history is provided by the patient.     Review of patient's allergies indicates:   Allergen Reactions    Lipitor [atorvastatin] Other (See Comments)     "it makes my legs feel like jelly"  Other reaction(s): causes legs to hurt     Past Medical History:   Diagnosis Date    Arthritis     CHF (congestive heart failure)     Cor pulmonale 11/5/2012    Diastolic dysfunction     Gallstones     Hypertension     Left ventricular systolic " dysfunction 11/5/2012    Morbid obesity 11/5/2012    Obesity     MALLIKA (obstructive sleep apnea)     PFO (patent foramen ovale) 11/5/2012    Pickwickian syndrome 11/5/2012    Pulmonary hypertension      History reviewed. No pertinent surgical history.  Family History   Problem Relation Age of Onset    Arthritis Mother     Asthma Mother     Hypertension Father     Asthma Sister     Mental illness Brother     Mental retardation Brother     Arthritis Maternal Grandmother     Asthma Maternal Grandmother     Diabetes Maternal Grandmother     Hypertension Maternal Grandmother     Arthritis Maternal Grandfather     Hypertension Maternal Grandfather     Hypertension Paternal Grandfather     Breast cancer Paternal Grandmother      Social History     Tobacco Use    Smoking status: Never Smoker    Smokeless tobacco: Never Used   Substance Use Topics    Alcohol use: Yes     Comment: holidays  rare    Drug use: No     Review of Systems   Constitutional: Negative for fever.   Respiratory: Negative for shortness of breath.    Cardiovascular: Positive for chest pain (resolved).   Gastrointestinal: Negative for nausea.   Genitourinary: Negative for dysuria.   Musculoskeletal: Negative for back pain.   Skin: Negative for rash.   Neurological: Negative for numbness.   Hematological: Does not bruise/bleed easily.       Physical Exam     Initial Vitals [10/15/19 1526]   BP Pulse Resp Temp SpO2   (!) 128/58 96 20 98.6 °F (37 °C) (!) 94 %      MAP       --         Physical Exam    Constitutional: He appears well-developed and well-nourished. No distress.   Obese, in no acute distress, on O2 with nasal cannula with home O2 tank.    HENT:   Head: Normocephalic and atraumatic.   Right Ear: External ear normal.   Left Ear: External ear normal.   Mouth/Throat: Oropharynx is clear and moist.   Eyes: EOM are normal. Pupils are equal, round, and reactive to light.   Neck: Normal range of motion. Neck supple.    Cardiovascular: Regular rhythm and normal heart sounds. Tachycardia present.  Exam reveals no gallop and no friction rub.    No murmur heard.  Pulmonary/Chest: Breath sounds normal. No respiratory distress. He has no wheezes. He has no rhonchi. He has no rales.   Abdominal: Soft. He exhibits no distension. There is no tenderness.   Musculoskeletal: Normal range of motion.   Neurological: He is alert and oriented to person, place, and time. He has normal strength. No cranial nerve deficit or sensory deficit.   Skin: Skin is warm and dry.   varicose veins in lower extremities, bilaterally.    Psychiatric: His behavior is normal. Thought content normal.         ED Course   Procedures  Labs Reviewed   CBC W/ AUTO DIFFERENTIAL - Abnormal; Notable for the following components:       Result Value    Mean Corpuscular Hemoglobin Conc 31.2 (*)     RDW 15.7 (*)     Lymph% 16.6 (*)     All other components within normal limits   COMPREHENSIVE METABOLIC PANEL - Abnormal; Notable for the following components:    Potassium 3.3 (*)     Chloride 92 (*)     CO2 35 (*)     BUN, Bld 24 (*)     Total Protein 9.2 (*)     Albumin 3.2 (*)     All other components within normal limits   TROPONIN I   B-TYPE NATRIURETIC PEPTIDE     EKG Readings: (Independently Interpreted)   Normal sinus rhythm at 95, with no ST elevation, no T wave inversion anteriorly. No significant changes when compared to EKG from March of this year.      ECG Results          EKG 12-lead (Final result)  Result time 10/15/19 16:20:20    Final result by Interface, Lab In [facility] (10/15/19 16:20:20)                 Narrative:    Test Reason : R07.9,    Vent. Rate : 095 BPM     Atrial Rate : 095 BPM     P-R Int : 186 ms          QRS Dur : 104 ms      QT Int : 396 ms       P-R-T Axes : 061 097 053 degrees     QTc Int : 497 ms    Normal sinus rhythm  Rightward axis  Nonspecific T wave abnormality  Prolonged QT  Abnormal ECG  When compared with ECG of 25-MAR-2019 13:33,  No  significant change was found  Confirmed by Carmella Weiss MD ([id number]) on 10/15/2019 4:20:08 PM    Referred By:             Confirmed By:Carmella Weiss MD                            Imaging Results          X-Ray Chest PA And Lateral (Final result)  Result time 10/15/19 19:07:49    Final result by Real Louis MD (10/15/19 19:07:49)                 Impression:      Borderline cardiomegaly with mild increased attenuation pulmonary interstitium.  The findings most likely represent mild pulmonary edema secondary to CHF.    Chronic enlargement of the pulmonary arteries, suggestive of pulmonary artery hypertension.      Electronically signed by: Real Louis MD  Date:    10/15/2019  Time:    19:07             Narrative:    EXAMINATION:  XR CHEST PA AND LATERAL    CLINICAL HISTORY:  Chest Pain;    TECHNIQUE:  PA and lateral views of the chest were performed.    COMPARISON:  04/13/2016.    FINDINGS:  Monitoring EKG leads are present.  The trachea is unremarkable.  There is borderline cardiomegaly the hemidiaphragms are unremarkable.  There is persistent enlargement of the pulmonary arteries.  There is no evidence of free air beneath the hemidiaphragms.  There are no pleural effusions.  There is no evidence of a pneumothorax.  There is no evidence of pneumomediastinum.  There is increased attenuation of the pulmonary interstitium.  No focal airspace opacity is present.  There are degenerative changes in the osseous structures.    There are postoperative changes in the right upper quadrant.  The remainder of the visualized upper abdominal structures are within normal limits.                              X-Rays:   Independently Interpreted Readings:   Chest X-Ray: Increase pulmonary vascular markings. Most recent chest xray is from 2016, appears slighly increased in comparison.      Medical Decision Making:   History:   Old Medical Records: I decided to obtain old medical records.  Old Records Summarized: records from clinic  visits.       <> Summary of Records: Primary care physician in may with history of pulmonary hypertension, patent foramen ovale on coumadin and on Revatio and comes in for lasix infusion every 2 weeks. History of hypoventilation syndrome associated with chronic respiratory failure on home O2. Left ventricular systolic and diastolic heart failure. Last ECHO was in March with EF of 48%   Initial Assessment:   44 y.o. Male with hx of pulmonary HTN on coumadin presents with an episode of chest pain this morning, now resolved. Differential diagnosis includes but is not limited to PE, ACS, acute MI, CHF, pneumothorax, pneumonia, esophageal spasm. I doubt acute MI, given patient reports an increase in activity recently and no additional chest pain. Also describes pain was pleuritic when it occurred this morning. However, given patient's medical history, will check a troponin. One troponin should be sufficient at ruling out MI given that this episode occurred around 12:30 PM. Will check INR, for therapeutic PE, unlikely. PE also less likely since symptoms has resolved. Will check chest xray to rule out pneumonia. It is possible his symptoms were esophogeal spasm given that his pain was resolved with gasX and belching.   Independently Interpreted Test(s):   I have ordered and independently interpreted X-rays - see prior notes.  I have ordered and independently interpreted EKG Reading(s) - see prior notes  Clinical Tests:   Lab Tests: Ordered and Reviewed  Radiological Study: Ordered and Reviewed  Medical Tests: Ordered and Reviewed            Scribe Attestation:   Scribe #1: I performed the above scribed service and the documentation accurately describes the services I performed. I attest to the accuracy of the note.    Attending Attestation:             Attending ED Notes:   Labs are unremarkable.  CXR shows mild CHF however last Xray to compare to is from 2017.  On further questioning pt reports he has missed some doses of  his regular meds, including his lasix.  He advises that he will contact his cardiologist, who is not at this facility, within the next few days, to arrange close follow up.  I counseled on the importnace of getting back on a regular medication regimen and pt expressed understanding.               Clinical Impression:       ICD-10-CM ICD-9-CM   1. Chest pain, unspecified type R07.9 786.50   2. Chest pain R07.9 786.50                                Yadi Lucia MD  10/17/19 [id number]

## 2019-10-16 NOTE — DISCHARGE INSTRUCTIONS
Our goal in the emergency department is to always give you outstanding care and exceptional service. You may receive a survey by mail or e-mail in the next week regarding your experience in our ED. We would greatly appreciate your completing and returning the survey. Your feedback provides us with a way to recognize our staff who give very good care and it helps us learn how to improve when your experience was below our aspiration of excellence.     Return to the emergency department for worsening in any way including return of your chest pain, shortness of breath, or any other problems.  Take all your medications when you return home including your potassium.

## 2019-10-17 ENCOUNTER — TELEPHONE (OUTPATIENT)
Dept: INTERNAL MEDICINE | Facility: CLINIC | Age: 44
End: 2019-10-17

## 2019-10-17 NOTE — TELEPHONE ENCOUNTER
----- Message from Lauryn Rojas sent at 10/17/2019 12:42 PM CDT -----  Contact: Patient [phone]  Patient is requesting orders so he can have his kidneys and livers checked. Call patient once lab orders are put in.    Please call and advise.    Thank You

## 2019-10-23 ENCOUNTER — TELEPHONE (OUTPATIENT)
Dept: INTERNAL MEDICINE | Facility: CLINIC | Age: 44
End: 2019-10-23

## 2019-10-23 ENCOUNTER — PATIENT MESSAGE (OUTPATIENT)
Dept: INTERNAL MEDICINE | Facility: CLINIC | Age: 44
End: 2019-10-23

## 2019-10-23 NOTE — TELEPHONE ENCOUNTER
"Spoke with patient, stated his cardiologist prescribed "tracleer" and to continue this medication regiment, he will need monthly labs. Advised patient to have provider (cardiologist) write orders for these labs to be done. Patient verbalized understanding.   "

## 2019-10-23 NOTE — TELEPHONE ENCOUNTER
Call patient   When he was at the emergency room October 15th, a chemistry tests done at the time revealed normal kidney and liver studies

## 2019-10-29 RX ORDER — METOPROLOL TARTRATE 25 MG/1
TABLET, FILM COATED ORAL
Qty: 180 TABLET | Refills: 3 | Status: SHIPPED | OUTPATIENT
Start: 2019-10-29 | End: 2020-11-07

## 2019-11-10 DIAGNOSIS — I27.9 CHRONIC PULMONARY HEART DISEASE: ICD-10-CM

## 2019-11-11 RX ORDER — ALBUTEROL SULFATE 90 UG/1
AEROSOL, METERED RESPIRATORY (INHALATION)
Qty: 18 G | Refills: 0 | Status: ON HOLD | OUTPATIENT
Start: 2019-11-11 | End: 2019-12-09 | Stop reason: HOSPADM

## 2019-11-16 DIAGNOSIS — I27.81 COR PULMONALE: ICD-10-CM

## 2019-11-16 DIAGNOSIS — I27.20 PULMONARY HYPERTENSION: ICD-10-CM

## 2019-11-16 DIAGNOSIS — E05.90 HYPERTHYROIDISM: ICD-10-CM

## 2019-11-16 DIAGNOSIS — I27.9 CHRONIC PULMONARY HEART DISEASE: ICD-10-CM

## 2019-11-16 DIAGNOSIS — Q21.12 PFO (PATENT FORAMEN OVALE): ICD-10-CM

## 2019-11-16 DIAGNOSIS — I27.0 PRIMARY PULMONARY HYPERTENSION: ICD-10-CM

## 2019-11-16 DIAGNOSIS — E66.2 PICKWICKIAN SYNDROME: ICD-10-CM

## 2019-11-16 DIAGNOSIS — I48.91 ATRIAL FIBRILLATION, UNSPECIFIED TYPE: ICD-10-CM

## 2019-11-16 DIAGNOSIS — I48.0 PAROXYSMAL ATRIAL FIBRILLATION: ICD-10-CM

## 2019-11-16 RX ORDER — ALLOPURINOL 300 MG/1
TABLET ORAL
Qty: 90 TABLET | Refills: 0 | Status: SHIPPED | OUTPATIENT
Start: 2019-11-16 | End: 2019-12-07 | Stop reason: CLARIF

## 2019-11-18 ENCOUNTER — LAB VISIT (OUTPATIENT)
Dept: LAB | Facility: HOSPITAL | Age: 44
End: 2019-11-18
Payer: MEDICARE

## 2019-11-18 DIAGNOSIS — I27.0 PRIMARY PULMONARY HYPERTENSION: Primary | ICD-10-CM

## 2019-11-18 LAB
ALBUMIN SERPL BCP-MCNC: 3 G/DL (ref 3.5–5.2)
ALP SERPL-CCNC: 153 U/L (ref 55–135)
ALT SERPL W/O P-5'-P-CCNC: 29 U/L (ref 10–44)
AST SERPL-CCNC: 31 U/L (ref 10–40)
BILIRUB DIRECT SERPL-MCNC: 0.3 MG/DL (ref 0.1–0.3)
BILIRUB SERPL-MCNC: 0.6 MG/DL (ref 0.1–1)
PROT SERPL-MCNC: 9.3 G/DL (ref 6–8.4)

## 2019-11-18 PROCEDURE — 36415 COLL VENOUS BLD VENIPUNCTURE: CPT

## 2019-11-18 PROCEDURE — 80076 HEPATIC FUNCTION PANEL: CPT

## 2019-12-07 ENCOUNTER — HOSPITAL ENCOUNTER (INPATIENT)
Facility: HOSPITAL | Age: 44
LOS: 2 days | Discharge: HOME | DRG: 291 | End: 2019-12-09
Attending: EMERGENCY MEDICINE | Admitting: EMERGENCY MEDICINE
Payer: MEDICARE

## 2019-12-07 ENCOUNTER — TELEPHONE (OUTPATIENT)
Dept: INTERNAL MEDICINE | Facility: CLINIC | Age: 44
End: 2019-12-07

## 2019-12-07 DIAGNOSIS — I27.9 CHRONIC PULMONARY HEART DISEASE: ICD-10-CM

## 2019-12-07 DIAGNOSIS — R06.02 SOB (SHORTNESS OF BREATH): ICD-10-CM

## 2019-12-07 DIAGNOSIS — R09.02 HYPOXIA: ICD-10-CM

## 2019-12-07 DIAGNOSIS — I50.43 ACUTE ON CHRONIC COMBINED SYSTOLIC (CONGESTIVE) AND DIASTOLIC (CONGESTIVE) HEART FAILURE: ICD-10-CM

## 2019-12-07 DIAGNOSIS — Z79.01 LONG TERM CURRENT USE OF ANTICOAGULANT THERAPY: ICD-10-CM

## 2019-12-07 DIAGNOSIS — E66.2 PICKWICKIAN SYNDROME: ICD-10-CM

## 2019-12-07 DIAGNOSIS — E66.01 MORBID OBESITY: ICD-10-CM

## 2019-12-07 DIAGNOSIS — I27.9 CHRONIC CARDIOPULMONARY DISEASE: ICD-10-CM

## 2019-12-07 DIAGNOSIS — R42 DIZZINESS: ICD-10-CM

## 2019-12-07 DIAGNOSIS — G47.33 OSA (OBSTRUCTIVE SLEEP APNEA): ICD-10-CM

## 2019-12-07 DIAGNOSIS — I27.81 COR PULMONALE: ICD-10-CM

## 2019-12-07 PROBLEM — I95.1 ORTHOSTATIC HYPOTENSION: Status: ACTIVE | Noted: 2019-12-07

## 2019-12-07 PROBLEM — N18.30 CKD (CHRONIC KIDNEY DISEASE) STAGE 3, GFR 30-59 ML/MIN: Status: ACTIVE | Noted: 2019-12-07

## 2019-12-07 LAB
ALBUMIN SERPL BCP-MCNC: 2.7 G/DL (ref 3.5–5.2)
ALP SERPL-CCNC: 115 U/L (ref 55–135)
ALT SERPL W/O P-5'-P-CCNC: 19 U/L (ref 10–44)
ANION GAP SERPL CALC-SCNC: 11 MMOL/L (ref 8–16)
AST SERPL-CCNC: 24 U/L (ref 10–40)
BASOPHILS # BLD AUTO: 0.03 K/UL (ref 0–0.2)
BASOPHILS NFR BLD: 0.4 % (ref 0–1.9)
BILIRUB SERPL-MCNC: 0.5 MG/DL (ref 0.1–1)
BILIRUB UR QL STRIP: NEGATIVE
BNP SERPL-MCNC: 11 PG/ML (ref 0–99)
BUN SERPL-MCNC: 30 MG/DL (ref 6–20)
CALCIUM SERPL-MCNC: 9 MG/DL (ref 8.7–10.5)
CHLORIDE SERPL-SCNC: 93 MMOL/L (ref 95–110)
CLARITY UR REFRACT.AUTO: CLEAR
CO2 SERPL-SCNC: 34 MMOL/L (ref 23–29)
COLOR UR AUTO: YELLOW
CREAT SERPL-MCNC: 1.2 MG/DL (ref 0.5–1.4)
DIFFERENTIAL METHOD: ABNORMAL
EOSINOPHIL # BLD AUTO: 0.1 K/UL (ref 0–0.5)
EOSINOPHIL NFR BLD: 1.6 % (ref 0–8)
ERYTHROCYTE [DISTWIDTH] IN BLOOD BY AUTOMATED COUNT: 14.7 % (ref 11.5–14.5)
EST. GFR  (AFRICAN AMERICAN): >60 ML/MIN/1.73 M^2
EST. GFR  (NON AFRICAN AMERICAN): >60 ML/MIN/1.73 M^2
GLUCOSE SERPL-MCNC: 133 MG/DL (ref 70–110)
GLUCOSE UR QL STRIP: NEGATIVE
HCT VFR BLD AUTO: 46.6 % (ref 40–54)
HGB BLD-MCNC: 13.8 G/DL (ref 14–18)
HGB UR QL STRIP: NEGATIVE
IMM GRANULOCYTES # BLD AUTO: 0.03 K/UL (ref 0–0.04)
IMM GRANULOCYTES NFR BLD AUTO: 0.4 % (ref 0–0.5)
INR PPP: 2.6 (ref 0.8–1.2)
KETONES UR QL STRIP: NEGATIVE
LEUKOCYTE ESTERASE UR QL STRIP: NEGATIVE
LYMPHOCYTES # BLD AUTO: 0.8 K/UL (ref 1–4.8)
LYMPHOCYTES NFR BLD: 10.5 % (ref 18–48)
MCH RBC QN AUTO: 28.5 PG (ref 27–31)
MCHC RBC AUTO-ENTMCNC: 29.6 G/DL (ref 32–36)
MCV RBC AUTO: 96 FL (ref 82–98)
MONOCYTES # BLD AUTO: 0.5 K/UL (ref 0.3–1)
MONOCYTES NFR BLD: 5.7 % (ref 4–15)
NEUTROPHILS # BLD AUTO: 6.4 K/UL (ref 1.8–7.7)
NEUTROPHILS NFR BLD: 81.4 % (ref 38–73)
NITRITE UR QL STRIP: NEGATIVE
NRBC BLD-RTO: 0 /100 WBC
PH UR STRIP: 6 [PH] (ref 5–8)
PLATELET # BLD AUTO: 210 K/UL (ref 150–350)
PMV BLD AUTO: 10.3 FL (ref 9.2–12.9)
POTASSIUM SERPL-SCNC: 3.5 MMOL/L (ref 3.5–5.1)
PROT SERPL-MCNC: 8.3 G/DL (ref 6–8.4)
PROT UR QL STRIP: NEGATIVE
PROTHROMBIN TIME: 25.1 SEC (ref 9–12.5)
RBC # BLD AUTO: 4.84 M/UL (ref 4.6–6.2)
SODIUM SERPL-SCNC: 138 MMOL/L (ref 136–145)
SP GR UR STRIP: 1.01 (ref 1–1.03)
TROPONIN I SERPL DL<=0.01 NG/ML-MCNC: <0.006 NG/ML (ref 0–0.03)
URN SPEC COLLECT METH UR: NORMAL
WBC # BLD AUTO: 7.91 K/UL (ref 3.9–12.7)

## 2019-12-07 PROCEDURE — 94660 CPAP INITIATION&MGMT: CPT | Mod: HCNC

## 2019-12-07 PROCEDURE — 99223 1ST HOSP IP/OBS HIGH 75: CPT | Mod: HCNC,AI,, | Performed by: HOSPITALIST

## 2019-12-07 PROCEDURE — 83880 ASSAY OF NATRIURETIC PEPTIDE: CPT | Mod: HCNC

## 2019-12-07 PROCEDURE — 84484 ASSAY OF TROPONIN QUANT: CPT | Mod: HCNC

## 2019-12-07 PROCEDURE — 63600175 PHARM REV CODE 636 W HCPCS: Mod: HCNC | Performed by: STUDENT IN AN ORGANIZED HEALTH CARE EDUCATION/TRAINING PROGRAM

## 2019-12-07 PROCEDURE — 93010 EKG 12-LEAD: ICD-10-PCS | Mod: HCNC,76,, | Performed by: INTERNAL MEDICINE

## 2019-12-07 PROCEDURE — 99900035 HC TECH TIME PER 15 MIN (STAT): Mod: HCNC

## 2019-12-07 PROCEDURE — 27000190 HC CPAP FULL FACE MASK W/VALVE: Mod: HCNC

## 2019-12-07 PROCEDURE — 99285 PR EMERGENCY DEPT VISIT,LEVEL V: ICD-10-PCS | Mod: ,,, | Performed by: EMERGENCY MEDICINE

## 2019-12-07 PROCEDURE — 20600001 HC STEP DOWN PRIVATE ROOM: Mod: HCNC

## 2019-12-07 PROCEDURE — 25000003 PHARM REV CODE 250: Mod: HCNC | Performed by: HOSPITALIST

## 2019-12-07 PROCEDURE — 93005 ELECTROCARDIOGRAM TRACING: CPT | Mod: HCNC

## 2019-12-07 PROCEDURE — 85610 PROTHROMBIN TIME: CPT | Mod: HCNC

## 2019-12-07 PROCEDURE — 81003 URINALYSIS AUTO W/O SCOPE: CPT | Mod: HCNC

## 2019-12-07 PROCEDURE — 80053 COMPREHEN METABOLIC PANEL: CPT | Mod: HCNC

## 2019-12-07 PROCEDURE — 99223 PR INITIAL HOSPITAL CARE,LEVL III: ICD-10-PCS | Mod: HCNC,AI,, | Performed by: HOSPITALIST

## 2019-12-07 PROCEDURE — 85025 COMPLETE CBC W/AUTO DIFF WBC: CPT | Mod: HCNC

## 2019-12-07 PROCEDURE — 93010 ELECTROCARDIOGRAM REPORT: CPT | Mod: HCNC,76,, | Performed by: INTERNAL MEDICINE

## 2019-12-07 PROCEDURE — 94761 N-INVAS EAR/PLS OXIMETRY MLT: CPT | Mod: HCNC

## 2019-12-07 PROCEDURE — 99285 EMERGENCY DEPT VISIT HI MDM: CPT | Mod: ,,, | Performed by: EMERGENCY MEDICINE

## 2019-12-07 PROCEDURE — 93010 ELECTROCARDIOGRAM REPORT: CPT | Mod: HCNC,,, | Performed by: INTERNAL MEDICINE

## 2019-12-07 PROCEDURE — 96374 THER/PROPH/DIAG INJ IV PUSH: CPT | Mod: HCNC

## 2019-12-07 PROCEDURE — 99285 EMERGENCY DEPT VISIT HI MDM: CPT | Mod: 25,HCNC

## 2019-12-07 RX ORDER — POTASSIUM CHLORIDE 750 MG/1
30 CAPSULE, EXTENDED RELEASE ORAL ONCE
Status: COMPLETED | OUTPATIENT
Start: 2019-12-07 | End: 2019-12-07

## 2019-12-07 RX ORDER — WARFARIN SODIUM 5 MG/1
10 TABLET ORAL DAILY
Status: DISCONTINUED | OUTPATIENT
Start: 2019-12-08 | End: 2019-12-09 | Stop reason: HOSPADM

## 2019-12-07 RX ORDER — ACETAMINOPHEN 325 MG/1
325 TABLET ORAL EVERY 4 HOURS PRN
Status: DISCONTINUED | OUTPATIENT
Start: 2019-12-07 | End: 2019-12-09 | Stop reason: HOSPADM

## 2019-12-07 RX ORDER — MECLIZINE HCL 12.5 MG 12.5 MG/1
25 TABLET ORAL ONCE
Status: COMPLETED | OUTPATIENT
Start: 2019-12-07 | End: 2019-12-07

## 2019-12-07 RX ORDER — IBUPROFEN 200 MG
16 TABLET ORAL
Status: DISCONTINUED | OUTPATIENT
Start: 2019-12-07 | End: 2019-12-09 | Stop reason: HOSPADM

## 2019-12-07 RX ORDER — IBUPROFEN 200 MG
24 TABLET ORAL
Status: DISCONTINUED | OUTPATIENT
Start: 2019-12-07 | End: 2019-12-09 | Stop reason: HOSPADM

## 2019-12-07 RX ORDER — SODIUM CHLORIDE 0.9 % (FLUSH) 0.9 %
10 SYRINGE (ML) INJECTION
Status: DISCONTINUED | OUTPATIENT
Start: 2019-12-07 | End: 2019-12-09 | Stop reason: HOSPADM

## 2019-12-07 RX ORDER — BOSENTAN 125 MG/1
125 TABLET, FILM COATED ORAL EVERY 12 HOURS
Status: DISCONTINUED | OUTPATIENT
Start: 2019-12-07 | End: 2019-12-09 | Stop reason: HOSPADM

## 2019-12-07 RX ORDER — FUROSEMIDE 10 MG/ML
80 INJECTION INTRAMUSCULAR; INTRAVENOUS
Status: COMPLETED | OUTPATIENT
Start: 2019-12-07 | End: 2019-12-07

## 2019-12-07 RX ORDER — ALLOPURINOL 300 MG/1
300 TABLET ORAL DAILY
Status: DISCONTINUED | OUTPATIENT
Start: 2019-12-08 | End: 2019-12-09 | Stop reason: HOSPADM

## 2019-12-07 RX ORDER — SILDENAFIL CITRATE 20 MG/1
20 TABLET ORAL 3 TIMES DAILY
Status: DISCONTINUED | OUTPATIENT
Start: 2019-12-07 | End: 2019-12-09 | Stop reason: HOSPADM

## 2019-12-07 RX ORDER — TALC
6 POWDER (GRAM) TOPICAL NIGHTLY PRN
Status: DISCONTINUED | OUTPATIENT
Start: 2019-12-07 | End: 2019-12-09 | Stop reason: HOSPADM

## 2019-12-07 RX ORDER — ONDANSETRON 2 MG/ML
4 INJECTION INTRAMUSCULAR; INTRAVENOUS EVERY 8 HOURS PRN
Status: DISCONTINUED | OUTPATIENT
Start: 2019-12-07 | End: 2019-12-09 | Stop reason: HOSPADM

## 2019-12-07 RX ORDER — METOPROLOL TARTRATE 25 MG/1
25 TABLET, FILM COATED ORAL 2 TIMES DAILY
Status: DISCONTINUED | OUTPATIENT
Start: 2019-12-07 | End: 2019-12-09 | Stop reason: HOSPADM

## 2019-12-07 RX ORDER — FLUTICASONE FUROATE AND VILANTEROL 100; 25 UG/1; UG/1
1 POWDER RESPIRATORY (INHALATION) DAILY
Status: DISCONTINUED | OUTPATIENT
Start: 2019-12-08 | End: 2019-12-09 | Stop reason: HOSPADM

## 2019-12-07 RX ORDER — IPRATROPIUM BROMIDE AND ALBUTEROL SULFATE 2.5; .5 MG/3ML; MG/3ML
3 SOLUTION RESPIRATORY (INHALATION) EVERY 6 HOURS PRN
Status: DISCONTINUED | OUTPATIENT
Start: 2019-12-07 | End: 2019-12-09 | Stop reason: HOSPADM

## 2019-12-07 RX ORDER — METOLAZONE 5 MG/1
5 TABLET ORAL DAILY
Status: DISCONTINUED | OUTPATIENT
Start: 2019-12-08 | End: 2019-12-09 | Stop reason: HOSPADM

## 2019-12-07 RX ADMIN — FUROSEMIDE 80 MG: 10 INJECTION, SOLUTION INTRAMUSCULAR; INTRAVENOUS at 06:12

## 2019-12-07 RX ADMIN — POTASSIUM CHLORIDE 30 MEQ: 750 CAPSULE, EXTENDED RELEASE ORAL at 09:12

## 2019-12-07 RX ADMIN — SILDENAFIL 20 MG: 20 TABLET ORAL at 08:12

## 2019-12-07 RX ADMIN — METOPROLOL TARTRATE 25 MG: 25 TABLET ORAL at 08:12

## 2019-12-07 RX ADMIN — MECLIZINE 25 MG: 12.5 TABLET ORAL at 09:12

## 2019-12-07 NOTE — PROVIDER PROGRESS NOTES - EMERGENCY DEPT.
Encounter Date: 12/7/2019    ED Physician Progress Notes        Physician Note:   S/o Dr. Saravia:  44-year-old male past medical history of CHF, CKD, pulmonary hypertension, presenting with 2 days of lightheadedness likely due to hypotension from over-diuresis, desaturations while ambulating appear to be chronic.  Patient will be evaluated by cardiology fellow with recommendations made.

## 2019-12-07 NOTE — RESIDENT HANDOFF
ED Physician Hand-off Note:    ED Course: I assumed care of patient from off-going ED physician team. Briefly, Patient is a 44yM with HTN,  Pulmonary HTN, CHF ( EF48%), MALLIKA who presents to ED with dizziness that began yesterday. Troponin negative, BNP wnl. Cards has been consulted for medication management recommendations given hypotension likely due to over-diuresis. Cardiology evaluated, will give 80 mg Lasix now and re-assess urine output.   Pt still symptomatic after 80 mg Lasix. Case discussed with cardiology, will admit to inpatient for further monitoring.     At the time of signout plan was pending Cardiology Recommendations.    Medications given in the ED: Lasix 80 mg IV    Medications   furosemide injection 80 mg (80 mg Intravenous Given 12/7/19 1851)       Imaging Results          X-Ray Chest PA And Lateral (Final result)  Result time 12/07/19 14:37:47    Final result by Axel Beck MD (12/07/19 14:37:47)                 Impression:      Findings suggesting pulmonary edema/CHF pattern, without focal consolidation.      Electronically signed by: Axel Beck MD  Date:    12/07/2019  Time:    14:37             Narrative:    EXAMINATION:  XR CHEST PA AND LATERAL    CLINICAL HISTORY:  Hypoxemia    TECHNIQUE:  PA and lateral views of the chest were performed.    COMPARISON:  Chest radiograph 10/15/2019    FINDINGS:  Monitoring leads overlie the chest.  Large body habitus.  Patient is slightly rotated.    Cardiac silhouette is enlarged similar to prior.  There is prominence of the central pulmonary vasculature with bilateral diffuse nonspecific mild interstitial coarsening with a perihilar and basilar distribution suggesting pulmonary edema/CHF pattern.  The lungs are otherwise well expanded without focal consolidation, pleural effusion or pneumothorax.  Upper mediastinal contours are within normal limits.  Osseous structures appear stable without acute process seen.  Upper abdominal surgical clips again  Patient is a 54y old  Male who presents with a chief complaint of anemia/ (16 May 2019 10:08)      HPI:  54 year old M HTN and ETOH abuse brought in by wife for generalized weakness and dark urine.  Wife reports patient has been having non-bloody non-bilious vomiting last week which stopped his drinking on Friday 5/3.  Wife also notes he has been having easy bruising and L leg swelling over the same amount of time.  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  Wife subsequently added that he has been having weakness and fatigue since early March with episodes of gum bleeding going back to early January.  Of note, wife noted that his skin color has changed over the past week.  Pt denies fevers, chills, eye pain vision changes, (08 May 2019 23:01)      INTERVAL HPI/OVERNIGHT EVENTS:  Appears slightly lethargic. The patient denies melena, hematochezia, hematemesis, nausea, vomiting, abdominal pain, constipation, diarrhea, or change in bowel movements Tolerating diet    MEDICATIONS  (STANDING):  chlorhexidine 2% Cloths 1 Application(s) Topical daily  chlorhexidine 4% Liquid 1 Application(s) Topical <User Schedule>  folic acid 1 milliGRAM(s) Oral daily  furosemide    Tablet 40 milliGRAM(s) Oral two times a day  multivitamin 1 Tablet(s) Oral daily  pantoprazole    Tablet 40 milliGRAM(s) Oral before breakfast    MEDICATIONS  (PRN):  LORazepam   Injectable 2 milliGRAM(s) IV Push every 2 hours PRN CIWA-Ar score 8 or greater  oxyCODONE    5 mG/acetaminophen 325 mG 1 Tablet(s) Oral every 4 hours PRN Moderate Pain (4 - 6)  oxyCODONE    5 mG/acetaminophen 325 mG 2 Tablet(s) Oral every 6 hours PRN Severe Pain (7 - 10)  polyethylene glycol 3350 17 Gram(s) Oral daily PRN Constipation      FAMILY HISTORY:      Allergies    No Known Allergies    Intolerances        PMH/PSH:  Benign essential HTN        REVIEW OF SYSTEMS:  CONSTITUTIONAL: No fever, weight loss,   EYES: No eye pain, visual disturbances, or discharge  ENMT:  No difficulty hearing, tinnitus, vertigo; No sinus or throat pain  NECK: No pain or stiffness  BREASTS: No pain, masses, or nipple discharge  RESPIRATORY: No cough, wheezing, chills or hemoptysis; No shortness of breath  CARDIOVASCULAR: No chest pain, palpitations, dizziness, or leg swelling  GASTROINTESTINAL:  see above  GENITOURINARY: No dysuria, frequency, hematuria, or incontinence  NEUROLOGICAL: No headaches, , numbness, or tremors  SKIN: No itching, burning, rashes, or lesions   LYMPH NODES: No enlarged glands  ENDOCRINE: No heat or cold intolerance; No hair loss  MUSCULOSKELETAL: No joint pain or swelling; No muscle, back, or extremity pain  PSYCHIATRIC: No depression, anxiety, mood swings, or difficulty sleeping  HEME/LYMPH: No easy bruising, or bleeding gums  ALLERGY AND IMMUNOLOGIC: No hives or eczema    Vital Signs Last 24 Hrs  T(C): 35.7 (16 May 2019 12:00), Max: 36.9 (15 May 2019 23:09)  T(F): 96.2 (16 May 2019 12:00), Max: 98.4 (15 May 2019 23:09)  HR: 74 (16 May 2019 12:00) (74 - 90)  BP: 96/62 (16 May 2019 12:00) (96/62 - 134/77)  BP(mean): --  RR: 18 (16 May 2019 12:00) (16 - 18)  SpO2: 99% (16 May 2019 12:00) (97% - 100%)    PHYSICAL EXAM:  GENERAL: NAD, well-groomed, well-developed  HEAD:  Atraumatic, Normocephalic  EYES: EOMI, PERRLA, conjunctiva and sclera clear  NECK: Supple, No JVD, Normal thyroid  NERVOUS SYSTEM:  Alert & Oriented X2 Fair  concentration;   CHEST/LUNG: Clear to percussion bilaterally; No rales, rhonchi, wheezing, or rubs  HEART: Regular rate and rhythm; No murmurs, rubs, or gallops  ABDOMEN: Soft, Nontender, Nondistended; Bowel sounds present  EXTREMITIES:  2+ Peripheral Pulses, No clubbing, cyanosis, or edema  LYMPH: No lymphadenopathy noted  SKIN: No rashes or lesions    LAB                          7.5    10.16 )-----------( 166      ( 16 May 2019 07:02 )             22.9       CBC:  05-16 @ 07:02  WBC 10.16   Hgb 7.5   Hct 22.9   Plts 166  MCV 96.6  05-15 @ 07:36  WBC 8.31   Hgb 8.1   Hct 25.0   Plts 154  MCV 96.2  05-14 @ 07:01  WBC 6.63   Hgb 7.2   Hct 21.9   Plts 122  MCV 94.0  05-13 @ 08:01  WBC 6.88   Hgb 5.9   Hct 18.0   Plts 132  MCV 94.2  05-12 @ 08:31  WBC 7.32   Hgb 6.2   Hct 18.7   Plts 101  MCV 94.0  05-11 @ 10:36  WBC 4.81   Hgb 7.3   Hct 21.8   Plts 90  MCV 92.4  05-11 @ 00:28  WBC 6.45   Hgb 6.6   Hct 19.5   Plts 82  MCV 93.8  05-10 @ 07:17  WBC 7.25   Hgb 5.1   Hct 15.1   Plts 80  MCV 96.8  05-09 @ 18:10  WBC 9.40   Hgb 6.3   Hct 18.5   Plts 89  MCV 97.9      Chemistry:  05-16 @ 07:02  Na+ 142  K+ 3.3  Cl- 109  CO2 22  BUN 30  Cr 1.12     05-15 @ 07:36  Na+ 142  K+ 3.7  Cl- 110  CO2 23  BUN 33  Cr 1.25     05-14 @ 07:01  Na+ 141  K+ 3.9  Cl- 108  CO2 21  BUN 36  Cr 1.48     05-13 @ 08:01  Na+ 139  K+ 3.9  Cl- 107  CO2 22  BUN 39  Cr 1.68     05-12 @ 08:31  Na+ 138  K+ 3.8  Cl- 105  CO2 23  BUN 39  Cr 1.71     05-11 @ 10:36  Na+ 137  K+ 3.8  Cl- 104  CO2 21  BUN 35  Cr 2.08     05-10 @ 07:17  Na+ 141  K+ 3.6  Cl- 108  CO2 22  BUN 36  Cr 2.45         Glucose, Serum: 120 mg/dL (05-16 @ 07:02)  Glucose, Serum: 92 mg/dL (05-15 @ 07:36)  Glucose, Serum: 120 mg/dL (05-14 @ 07:01)  Glucose, Serum: 113 mg/dL (05-13 @ 08:01)  Glucose, Serum: 108 mg/dL (05-12 @ 08:31)  Glucose, Serum: 170 mg/dL (05-11 @ 10:36)  Glucose, Serum: 107 mg/dL (05-10 @ 07:17)      16 May 2019 07:02    142    |  109    |  30     ----------------------------<  120    3.3     |  22     |  1.12   15 May 2019 07:36    142    |  110    |  33     ----------------------------<  92     3.7     |  23     |  1.25   14 May 2019 07:01    141    |  108    |  36     ----------------------------<  120    3.9     |  21     |  1.48   13 May 2019 08:01    139    |  107    |  39     ----------------------------<  113    3.9     |  22     |  1.68   12 May 2019 08:31    138    |  105    |  39     ----------------------------<  108    3.8     |  23     |  1.71   11 May 2019 10:36    137    |  104    |  35     ----------------------------<  170    3.8     |  21     |  2.08   10 May 2019 07:17    141    |  108    |  36     ----------------------------<  107    3.6     |  22     |  2.45     Ca    8.7        16 May 2019 07:02  Ca    8.5        15 May 2019 07:36  Ca    8.2        14 May 2019 07:01  Ca    8.3        13 May 2019 08:01  Ca    8.2        12 May 2019 08:31  Ca    7.9        11 May 2019 10:36  Ca    7.5        10 May 2019 07:17  Phos  3.1       16 May 2019 07:02  Phos  2.9       15 May 2019 07:36  Phos  2.9       14 May 2019 07:01  Phos  2.9       10 May 2019 07:17  Mg     1.7       16 May 2019 07:02  Mg     1.8       15 May 2019 07:36  Mg     1.7       14 May 2019 07:01  Mg     1.8       10 May 2019 07:17    TPro  7.6    /  Alb  2.1    /  TBili  10.1   /  DBili  6.48   /  AST  112    /  ALT  21     /  AlkPhos  108    16 May 2019 07:02  TPro  x      /  Alb  x      /  TBili  8.4    /  DBili  x      /  AST  x      /  ALT  x      /  AlkPhos  x      15 May 2019 07:37  TPro  7.6    /  Alb  1.9    /  TBili  8.4    /  DBili  4.68   /  AST  81     /  ALT  17     /  AlkPhos  127    15 May 2019 07:36  TPro  7.0    /  Alb  1.8    /  TBili  8.2    /  DBili  x      /  AST  64     /  ALT  12     /  AlkPhos  117    14 May 2019 07:01  TPro  6.5    /  Alb  1.7    /  TBili  8.2    /  DBili  4.12   /  AST  66     /  ALT  11     /  AlkPhos  114    13 May 2019 08:01  TPro  6.7    /  Alb  1.8    /  TBili  8.0    /  DBili  4.11   /  AST  63     /  ALT  13     /  AlkPhos  79     12 May 2019 08:31  TPro  6.9    /  Alb  1.8    /  TBili  8.8    /  DBili  4.47   /  AST  76     /  ALT  12     /  AlkPhos  96     11 May 2019 10:36      PT/INR - ( 16 May 2019 07:02 )   PT: 21.3 sec;   INR: 1.87 ratio         PTT - ( 16 May 2019 07:02 )  PTT:29.6 sec        CAPILLARY BLOOD GLUCOSE              RADIOLOGY & ADDITIONAL TESTS:    Imaging Personally Reviewed:  [ ] YES  [ ] NO    Consultant(s) Notes Reviewed:  [ ] YES  [ ] NO    Care Discussed with Consultants/Other Providers [ ] YES  [ ] NO noted.                                8:01 PM  Pt still symptomatic after 80 mg Lasix. Case discussed with Cardiology.     Disposition: Admitted to hospital medicine for further management, diureses, and cardiology follow-up.       Patient is a 54y old  Male who presents with a chief complaint of anemia/ (16 May 2019 10:08)      HPI:  54 year old M HTN and ETOH abuse brought in by wife for generalized weakness and dark urine.  Wife reports patient has been having non-bloody non-bilious vomiting last week which stopped his drinking on Friday 5/3.  Wife also notes he has been having easy bruising and L leg swelling over the same amount of time.  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  Wife subsequently added that he has been having weakness and fatigue since early March with episodes of gum bleeding going back to early January.  Of note, wife noted that his skin color has changed over the past week.  Pt denies fevers, chills, eye pain vision changes, (08 May 2019 23:01)      INTERVAL HPI/OVERNIGHT EVENTS:  Appears slightly lethargic. The patient denies melena, hematochezia, hematemesis, nausea, vomiting, abdominal pain, constipation, diarrhea, or change in bowel movements Tolerating diet    MEDICATIONS  (STANDING):  chlorhexidine 2% Cloths 1 Application(s) Topical daily  chlorhexidine 4% Liquid 1 Application(s) Topical <User Schedule>  folic acid 1 milliGRAM(s) Oral daily  furosemide    Tablet 40 milliGRAM(s) Oral two times a day  multivitamin 1 Tablet(s) Oral daily  pantoprazole    Tablet 40 milliGRAM(s) Oral before breakfast    MEDICATIONS  (PRN):  LORazepam   Injectable 2 milliGRAM(s) IV Push every 2 hours PRN CIWA-Ar score 8 or greater  oxyCODONE    5 mG/acetaminophen 325 mG 1 Tablet(s) Oral every 4 hours PRN Moderate Pain (4 - 6)  oxyCODONE    5 mG/acetaminophen 325 mG 2 Tablet(s) Oral every 6 hours PRN Severe Pain (7 - 10)  polyethylene glycol 3350 17 Gram(s) Oral daily PRN Constipation      FAMILY HISTORY:      Allergies    No Known Allergies    Intolerances        PMH/PSH:  Benign essential HTN        REVIEW OF SYSTEMS:  CONSTITUTIONAL: No fever, weight loss,   EYES: No eye pain, visual disturbances, or discharge  ENMT:  No difficulty hearing, tinnitus, vertigo; No sinus or throat pain  NECK: No pain or stiffness  BREASTS: No pain, masses, or nipple discharge  RESPIRATORY: No cough, wheezing, chills or hemoptysis; No shortness of breath  CARDIOVASCULAR: No chest pain, palpitations, dizziness, or leg swelling  GASTROINTESTINAL:  see above  GENITOURINARY: No dysuria, frequency, hematuria, or incontinence  NEUROLOGICAL: No headaches, , numbness, or tremors  SKIN: No itching, burning, rashes, or lesions   LYMPH NODES: No enlarged glands  ENDOCRINE: No heat or cold intolerance; No hair loss  MUSCULOSKELETAL: No joint pain or swelling; No muscle, back, or extremity pain  PSYCHIATRIC: No depression, anxiety, mood swings, or difficulty sleeping  HEME/LYMPH: No easy bruising, or bleeding gums  ALLERGY AND IMMUNOLOGIC: No hives or eczema    Vital Signs Last 24 Hrs  T(C): 35.7 (16 May 2019 12:00), Max: 36.9 (15 May 2019 23:09)  T(F): 96.2 (16 May 2019 12:00), Max: 98.4 (15 May 2019 23:09)  HR: 74 (16 May 2019 12:00) (74 - 90)  BP: 96/62 (16 May 2019 12:00) (96/62 - 134/77)  BP(mean): --  RR: 18 (16 May 2019 12:00) (16 - 18)  SpO2: 99% (16 May 2019 12:00) (97% - 100%)    PHYSICAL EXAM:  GENERAL: NAD, well-groomed, well-developed  HEAD:  Atraumatic, Normocephalic  EYES: EOMI, PERRLA, conjunctiva and sclera icteric   NECK: Supple, No JVD, Normal thyroid  NERVOUS SYSTEM:  Alert & Oriented X2 Fair  concentration; No asterixis  CHEST/LUNG: Clear to percussion bilaterally; No rales, rhonchi, wheezing, or rubs  HEART: Regular rate and rhythm; No murmurs, rubs, or gallops  ABDOMEN: Soft, Nontender, Nondistended; Bowel sounds present  EXTREMITIES:  2+ Peripheral Pulses, No clubbing, cyanosis, or edema  LYMPH: No lymphadenopathy noted  SKIN: No rashes or lesions    LAB                          7.5    10.16 )-----------( 166      ( 16 May 2019 07:02 )             22.9       CBC:  05-16 @ 07:02  WBC 10.16   Hgb 7.5   Hct 22.9   Plts 166  MCV 96.6  05-15 @ 07:36  WBC 8.31   Hgb 8.1   Hct 25.0   Plts 154  MCV 96.2  05-14 @ 07:01  WBC 6.63   Hgb 7.2   Hct 21.9   Plts 122  MCV 94.0  05-13 @ 08:01  WBC 6.88   Hgb 5.9   Hct 18.0   Plts 132  MCV 94.2  05-12 @ 08:31  WBC 7.32   Hgb 6.2   Hct 18.7   Plts 101  MCV 94.0  05-11 @ 10:36  WBC 4.81   Hgb 7.3   Hct 21.8   Plts 90  MCV 92.4  05-11 @ 00:28  WBC 6.45   Hgb 6.6   Hct 19.5   Plts 82  MCV 93.8  05-10 @ 07:17  WBC 7.25   Hgb 5.1   Hct 15.1   Plts 80  MCV 96.8  05-09 @ 18:10  WBC 9.40   Hgb 6.3   Hct 18.5   Plts 89  MCV 97.9      Chemistry:  05-16 @ 07:02  Na+ 142  K+ 3.3  Cl- 109  CO2 22  BUN 30  Cr 1.12     05-15 @ 07:36  Na+ 142  K+ 3.7  Cl- 110  CO2 23  BUN 33  Cr 1.25     05-14 @ 07:01  Na+ 141  K+ 3.9  Cl- 108  CO2 21  BUN 36  Cr 1.48     05-13 @ 08:01  Na+ 139  K+ 3.9  Cl- 107  CO2 22  BUN 39  Cr 1.68     05-12 @ 08:31  Na+ 138  K+ 3.8  Cl- 105  CO2 23  BUN 39  Cr 1.71     05-11 @ 10:36  Na+ 137  K+ 3.8  Cl- 104  CO2 21  BUN 35  Cr 2.08     05-10 @ 07:17  Na+ 141  K+ 3.6  Cl- 108  CO2 22  BUN 36  Cr 2.45         Glucose, Serum: 120 mg/dL (05-16 @ 07:02)  Glucose, Serum: 92 mg/dL (05-15 @ 07:36)  Glucose, Serum: 120 mg/dL (05-14 @ 07:01)  Glucose, Serum: 113 mg/dL (05-13 @ 08:01)  Glucose, Serum: 108 mg/dL (05-12 @ 08:31)  Glucose, Serum: 170 mg/dL (05-11 @ 10:36)  Glucose, Serum: 107 mg/dL (05-10 @ 07:17)      16 May 2019 07:02    142    |  109    |  30     ----------------------------<  120    3.3     |  22     |  1.12   15 May 2019 07:36    142    |  110    |  33     ----------------------------<  92     3.7     |  23     |  1.25   14 May 2019 07:01    141    |  108    |  36     ----------------------------<  120    3.9     |  21     |  1.48   13 May 2019 08:01    139    |  107    |  39     ----------------------------<  113    3.9     |  22     |  1.68   12 May 2019 08:31    138    |  105    |  39     ----------------------------<  108    3.8     |  23     |  1.71   11 May 2019 10:36    137    |  104    |  35     ----------------------------<  170    3.8     |  21     |  2.08   10 May 2019 07:17    141    |  108    |  36     ----------------------------<  107    3.6     |  22     |  2.45     Ca    8.7        16 May 2019 07:02  Ca    8.5        15 May 2019 07:36  Ca    8.2        14 May 2019 07:01  Ca    8.3        13 May 2019 08:01  Ca    8.2        12 May 2019 08:31  Ca    7.9        11 May 2019 10:36  Ca    7.5        10 May 2019 07:17  Phos  3.1       16 May 2019 07:02  Phos  2.9       15 May 2019 07:36  Phos  2.9       14 May 2019 07:01  Phos  2.9       10 May 2019 07:17  Mg     1.7       16 May 2019 07:02  Mg     1.8       15 May 2019 07:36  Mg     1.7       14 May 2019 07:01  Mg     1.8       10 May 2019 07:17    TPro  7.6    /  Alb  2.1    /  TBili  10.1   /  DBili  6.48   /  AST  112    /  ALT  21     /  AlkPhos  108    16 May 2019 07:02  TPro  x      /  Alb  x      /  TBili  8.4    /  DBili  x      /  AST  x      /  ALT  x      /  AlkPhos  x      15 May 2019 07:37  TPro  7.6    /  Alb  1.9    /  TBili  8.4    /  DBili  4.68   /  AST  81     /  ALT  17     /  AlkPhos  127    15 May 2019 07:36  TPro  7.0    /  Alb  1.8    /  TBili  8.2    /  DBili  x      /  AST  64     /  ALT  12     /  AlkPhos  117    14 May 2019 07:01  TPro  6.5    /  Alb  1.7    /  TBili  8.2    /  DBili  4.12   /  AST  66     /  ALT  11     /  AlkPhos  114    13 May 2019 08:01  TPro  6.7    /  Alb  1.8    /  TBili  8.0    /  DBili  4.11   /  AST  63     /  ALT  13     /  AlkPhos  79     12 May 2019 08:31  TPro  6.9    /  Alb  1.8    /  TBili  8.8    /  DBili  4.47   /  AST  76     /  ALT  12     /  AlkPhos  96     11 May 2019 10:36      PT/INR - ( 16 May 2019 07:02 )   PT: 21.3 sec;   INR: 1.87 ratio         PTT - ( 16 May 2019 07:02 )  PTT:29.6 sec        CAPILLARY BLOOD GLUCOSE              RADIOLOGY & ADDITIONAL TESTS:    Imaging Personally Reviewed:  [ ] YES  [ ] NO    Consultant(s) Notes Reviewed:  [ ] YES  [ ] NO    Care Discussed with Consultants/Other Providers [ ] YES  [ ] NO

## 2019-12-07 NOTE — ED NOTES
Pt identifiers checked and accurate with Yong Mcintyre     Pt reports to ED with complaints of dizziness beginning lastnight. Pt reports dizziness resolved last night and returned again this AM. Pt reports nausea without vomiting. Pt denies trauma, falls, diarrhea, fever, chills.     LOC: The patient is awake, alert and aware of environment with an appropriate affect, the patient is oriented x 3 and speaking appropriately.  APPEARANCE: Patient resting comfortably and in no acute distress, patient is clean and well groomed.  SKIN: The skin is warm and dry, color consistent with ethnicity, patient has normal skin turgor and moist mucus membranes, skin intact.  MUSCULOSKELETAL: Patient moving all extremities well, no obvious swelling or deformities noted.   RESPIRATORY: Airway is open and patent; respirations are spontaneous, patient has a normal effort and rate, no accessory muscle use noted. Pt presents on home oxygen 3 L NC, pt placed on wall oxygen 3 L NC, 93% oxygen saturation.   CARDIAC: Patient has no peripheral edema noted. No complaints of chest pain at this time.   ABDOMEN: Soft and non tender to palpation, no distention noted. Bowel sounds present x 4  NEUROLOGIC: PERRL, eyes open spontaneously, behavior appropriate to situation, follows commands, facial expression symmetrical, bilateral hand grasp equal and even, purposeful motor response noted, normal sensation in all extremities when touched with a finger.

## 2019-12-07 NOTE — TELEPHONE ENCOUNTER
----- Message from Rohith Shipman sent at 12/7/2019 10:17 AM CST -----  Contact: Patient [phone]  Patient would like to get medical advice.     Comments: Patient stating is feeling dizzy requesting to speak with pcp office.    Got over to nurse on call due to symptoms    Please call an advise  Thank you

## 2019-12-07 NOTE — ED PROVIDER NOTES
"Encounter Date: 12/7/2019       History     Chief Complaint   Patient presents with    Dizziness     Pulse ox  86% on 4l/nc, BP low- 95/54. Onset yesterday     MR. Mcintyre is 45 y/o male with a history of HTN, pulmo HTN, CHF ( EF48%, 3/2019 ), MALLIKA who presents to ED with dizziness for one day. He reports feeling " room spinning" which started yesterday around 6 pm which has improved. He reports returning of the symptoms again this morning with associated nauseas, flashing and sweating. He states the symptoms were constant worsening with standing up or moving head in both direction. He denies palpitation, CP, SOB, LOC, headache, ear pain or change in hearing , change in vision, abd pain, vomiting, blood in stool or any other bleeding or dysuria. He reports 2 episodes before one with IV lasix and one with blood donation in [employer]. Denies any change in his home meds or loss of apatite.     The history is provided by the patient and medical records.     Review of patient's allergies indicates:   Allergen Reactions    Lipitor [atorvastatin] Other (See Comments)     "it makes my legs feel like jelly"  Other reaction(s): causes legs to hurt     Past Medical History:   Diagnosis Date    Arthritis     CHF (congestive heart failure)     Cor pulmonale 11/5/2012    Diastolic dysfunction     Gallstones     Hypertension     Left ventricular systolic dysfunction 11/5/2012    Morbid obesity 11/5/2012    Obesity     MALLIKA (obstructive sleep apnea)     PFO (patent foramen ovale) 11/5/2012    Pickwickian syndrome 11/5/2012    Pulmonary hypertension      No past surgical history on file.  Family History   Problem Relation Age of Onset    Arthritis Mother     Asthma Mother     Hypertension Father     Asthma Sister     Mental illness Brother     Mental retardation Brother     Arthritis Maternal Grandmother     Asthma Maternal Grandmother     Diabetes Maternal Grandmother     Hypertension Maternal Grandmother  "    Arthritis Maternal Grandfather     Hypertension Maternal Grandfather     Hypertension Paternal Grandfather     Breast cancer Paternal Grandmother      Social History     Tobacco Use    Smoking status: Never Smoker    Smokeless tobacco: Never Used   Substance Use Topics    Alcohol use: Yes     Comment: holidays  rare    Drug use: No     Review of Systems   Constitutional: Negative for activity change, appetite change, chills, fatigue and fever.   HENT: Negative for congestion, facial swelling, sore throat and trouble swallowing.    Respiratory: Negative for apnea, cough, chest tightness, shortness of breath and wheezing.    Cardiovascular: Negative for chest pain, palpitations and leg swelling.   Gastrointestinal: Negative for abdominal distention, abdominal pain, blood in stool, diarrhea, nausea and vomiting.   Genitourinary: Negative for difficulty urinating, dysuria, flank pain and hematuria.   Musculoskeletal: Negative for arthralgias, back pain, myalgias and neck pain.   Skin: Negative for color change and rash.   Neurological: Positive for dizziness. Negative for weakness, light-headedness, numbness and headaches.   Hematological: Negative for adenopathy.   Psychiatric/Behavioral: Negative for agitation, behavioral problems and hallucinations.       Physical Exam     Initial Vitals [12/07/19 1250]   BP Pulse Resp Temp SpO2   (!) 95/54 75 20 97.3 °F (36.3 °C) (!) 84 %      MAP       --         Physical Exam    Constitutional: He appears well-developed and well-nourished. He is Obese .   HENT:   Head: Normocephalic and atraumatic.   Mouth/Throat: No oropharyngeal exudate.   Eyes: Conjunctivae and EOM are normal. Pupils are equal, round, and reactive to light.   Neck: Normal range of motion. Neck supple. No thyromegaly present. No JVD present.   Cardiovascular: Normal rate, regular rhythm and normal heart sounds.   No murmur heard.  Pulmonary/Chest: No respiratory distress. He has no wheezes. He has no  rhonchi. He has no rales.   Abdominal: Soft. Bowel sounds are normal. He exhibits no distension. There is no tenderness. There is no rebound.   Musculoskeletal: He exhibits no edema.   Neurological: He is alert and oriented to person, place, and time. He has normal strength and normal reflexes. He displays normal reflexes. No cranial nerve deficit or sensory deficit.   Skin: Skin is warm. Capillary refill takes less than 2 seconds. No erythema.   Psychiatric: He has a normal mood and affect. His behavior is normal. Judgment and thought content normal.         ED Course   Procedures  Labs Reviewed   CBC W/ AUTO DIFFERENTIAL - Abnormal; Notable for the following components:       Result Value    Hemoglobin 13.8 (*)     Mean Corpuscular Hemoglobin Conc 29.6 (*)     RDW 14.7 (*)     Lymph # 0.8 (*)     Gran% 81.4 (*)     Lymph% 10.5 (*)     All other components within normal limits   COMPREHENSIVE METABOLIC PANEL - Abnormal; Notable for the following components:    Chloride 93 (*)     CO2 34 (*)     Glucose 133 (*)     BUN, Bld 30 (*)     Albumin 2.7 (*)     All other components within normal limits   PROTIME-INR - Abnormal; Notable for the following components:    Prothrombin Time 25.1 (*)     INR 2.6 (*)     All other components within normal limits   B-TYPE NATRIURETIC PEPTIDE   URINALYSIS   TROPONIN I   TROPONIN I    Narrative:     add on troponin per md ora jauregui  12/07/2019  15:27           Imaging Results          X-Ray Chest PA And Lateral (Final result)  Result time 12/07/19 14:37:47    Final result by Axel Beck MD (12/07/19 14:37:47)                 Impression:      Findings suggesting pulmonary edema/CHF pattern, without focal consolidation.      Electronically signed by: Axel Beck MD  Date:    12/07/2019  Time:    14:37             Narrative:    EXAMINATION:  XR CHEST PA AND LATERAL    CLINICAL HISTORY:  Hypoxemia    TECHNIQUE:  PA and lateral views of the chest were performed.    COMPARISON:  Chest  radiograph 10/15/2019    FINDINGS:  Monitoring leads overlie the chest.  Large body habitus.  Patient is slightly rotated.    Cardiac silhouette is enlarged similar to prior.  There is prominence of the central pulmonary vasculature with bilateral diffuse nonspecific mild interstitial coarsening with a perihilar and basilar distribution suggesting pulmonary edema/CHF pattern.  The lungs are otherwise well expanded without focal consolidation, pleural effusion or pneumothorax.  Upper mediastinal contours are within normal limits.  Osseous structures appear stable without acute process seen.  Upper abdominal surgical clips again noted.                                 Medical Decision Making:   Initial Assessment:   Miguelina is 45 y/o male with a history of HTN, pulmo HTN, CHF ( EF48%, 3/2019 ), MALLIKA who presents to ED with dizziness for one day. Pt was hypotensive and hypoxic. Now he is sating well on 3L o2  Differential Diagnosis:   Arrhythmia  drug induced orthostatic hypotension   Clinical Tests:   Lab Tests: Ordered  Radiological Study: Ordered  Medical Tests: Ordered  ED Management:  - CXR, CBC, CMP, INR, trop  - cardiology consulted for meds adjustment and IVF replacement    Other:   I discussed test(s) with the performing physician.                                 Clinical Impression:       ICD-10-CM ICD-9-CM   1. SOB (shortness of breath) R06.02 786.05   2. Hypoxia R09.02 799.02   3. Dizziness R42 780.4                             Gera Lee MD  Resident  12/07/19 [id number]

## 2019-12-08 LAB
ANION GAP SERPL CALC-SCNC: 12 MMOL/L (ref 8–16)
BASOPHILS # BLD AUTO: 0.03 K/UL (ref 0–0.2)
BASOPHILS NFR BLD: 0.3 % (ref 0–1.9)
BUN SERPL-MCNC: 23 MG/DL (ref 6–20)
CALCIUM SERPL-MCNC: 8.8 MG/DL (ref 8.7–10.5)
CHLORIDE SERPL-SCNC: 94 MMOL/L (ref 95–110)
CO2 SERPL-SCNC: 33 MMOL/L (ref 23–29)
CREAT SERPL-MCNC: 1 MG/DL (ref 0.5–1.4)
DIFFERENTIAL METHOD: ABNORMAL
EOSINOPHIL # BLD AUTO: 0.3 K/UL (ref 0–0.5)
EOSINOPHIL NFR BLD: 2.8 % (ref 0–8)
ERYTHROCYTE [DISTWIDTH] IN BLOOD BY AUTOMATED COUNT: 14.6 % (ref 11.5–14.5)
EST. GFR  (AFRICAN AMERICAN): >60 ML/MIN/1.73 M^2
EST. GFR  (NON AFRICAN AMERICAN): >60 ML/MIN/1.73 M^2
GLUCOSE SERPL-MCNC: 94 MG/DL (ref 70–110)
HCT VFR BLD AUTO: 45.6 % (ref 40–54)
HGB BLD-MCNC: 13.8 G/DL (ref 14–18)
IMM GRANULOCYTES # BLD AUTO: 0.02 K/UL (ref 0–0.04)
IMM GRANULOCYTES NFR BLD AUTO: 0.2 % (ref 0–0.5)
INR PPP: 2.6 (ref 0.8–1.2)
LYMPHOCYTES # BLD AUTO: 1.4 K/UL (ref 1–4.8)
LYMPHOCYTES NFR BLD: 15.5 % (ref 18–48)
MAGNESIUM SERPL-MCNC: 1.4 MG/DL (ref 1.6–2.6)
MCH RBC QN AUTO: 28.8 PG (ref 27–31)
MCHC RBC AUTO-ENTMCNC: 30.3 G/DL (ref 32–36)
MCV RBC AUTO: 95 FL (ref 82–98)
MONOCYTES # BLD AUTO: 0.7 K/UL (ref 0.3–1)
MONOCYTES NFR BLD: 7.3 % (ref 4–15)
NEUTROPHILS # BLD AUTO: 6.7 K/UL (ref 1.8–7.7)
NEUTROPHILS NFR BLD: 73.9 % (ref 38–73)
NRBC BLD-RTO: 0 /100 WBC
PLATELET # BLD AUTO: 223 K/UL (ref 150–350)
PMV BLD AUTO: 9.9 FL (ref 9.2–12.9)
POTASSIUM SERPL-SCNC: 3.3 MMOL/L (ref 3.5–5.1)
PROTHROMBIN TIME: 24.9 SEC (ref 9–12.5)
RBC # BLD AUTO: 4.79 M/UL (ref 4.6–6.2)
SODIUM SERPL-SCNC: 139 MMOL/L (ref 136–145)
WBC # BLD AUTO: 9.01 K/UL (ref 3.9–12.7)

## 2019-12-08 PROCEDURE — 99233 PR SUBSEQUENT HOSPITAL CARE,LEVL III: ICD-10-PCS | Mod: HCNC,,, | Performed by: HOSPITALIST

## 2019-12-08 PROCEDURE — 85610 PROTHROMBIN TIME: CPT | Mod: HCNC

## 2019-12-08 PROCEDURE — 25000003 PHARM REV CODE 250: Mod: HCNC | Performed by: HOSPITALIST

## 2019-12-08 PROCEDURE — 83735 ASSAY OF MAGNESIUM: CPT | Mod: HCNC

## 2019-12-08 PROCEDURE — 36415 COLL VENOUS BLD VENIPUNCTURE: CPT | Mod: HCNC

## 2019-12-08 PROCEDURE — 20600001 HC STEP DOWN PRIVATE ROOM: Mod: HCNC

## 2019-12-08 PROCEDURE — 80048 BASIC METABOLIC PNL TOTAL CA: CPT | Mod: HCNC

## 2019-12-08 PROCEDURE — 99233 SBSQ HOSP IP/OBS HIGH 50: CPT | Mod: HCNC,,, | Performed by: HOSPITALIST

## 2019-12-08 PROCEDURE — 25000242 PHARM REV CODE 250 ALT 637 W/ HCPCS: Mod: HCNC | Performed by: HOSPITALIST

## 2019-12-08 PROCEDURE — 85025 COMPLETE CBC W/AUTO DIFF WBC: CPT | Mod: HCNC

## 2019-12-08 PROCEDURE — 63600175 PHARM REV CODE 636 W HCPCS: Mod: HCNC | Performed by: HOSPITALIST

## 2019-12-08 PROCEDURE — 99900035 HC TECH TIME PER 15 MIN (STAT): Mod: HCNC

## 2019-12-08 RX ORDER — MECLIZINE HYDROCHLORIDE 25 MG/1
25 TABLET ORAL 3 TIMES DAILY PRN
Status: DISCONTINUED | OUTPATIENT
Start: 2019-12-08 | End: 2019-12-09 | Stop reason: HOSPADM

## 2019-12-08 RX ORDER — FUROSEMIDE 40 MG/1
80 TABLET ORAL 2 TIMES DAILY
Status: DISCONTINUED | OUTPATIENT
Start: 2019-12-09 | End: 2019-12-09 | Stop reason: HOSPADM

## 2019-12-08 RX ORDER — POTASSIUM CHLORIDE 750 MG/1
30 CAPSULE, EXTENDED RELEASE ORAL EVERY 4 HOURS
Status: COMPLETED | OUTPATIENT
Start: 2019-12-08 | End: 2019-12-08

## 2019-12-08 RX ORDER — MAGNESIUM SULFATE HEPTAHYDRATE 40 MG/ML
2 INJECTION, SOLUTION INTRAVENOUS ONCE
Status: COMPLETED | OUTPATIENT
Start: 2019-12-08 | End: 2019-12-08

## 2019-12-08 RX ADMIN — POTASSIUM CHLORIDE 30 MEQ: 750 CAPSULE, EXTENDED RELEASE ORAL at 08:12

## 2019-12-08 RX ADMIN — ALLOPURINOL 300 MG: 300 TABLET ORAL at 08:12

## 2019-12-08 RX ADMIN — SILDENAFIL 20 MG: 20 TABLET ORAL at 08:12

## 2019-12-08 RX ADMIN — MECLIZINE HYDROCHLORIDE 25 MG: 25 TABLET ORAL at 04:12

## 2019-12-08 RX ADMIN — METOPROLOL TARTRATE 25 MG: 25 TABLET ORAL at 08:12

## 2019-12-08 RX ADMIN — FLUTICASONE FUROATE AND VILANTEROL TRIFENATATE 1 PUFF: 100; 25 POWDER RESPIRATORY (INHALATION) at 09:12

## 2019-12-08 RX ADMIN — MAGNESIUM SULFATE IN WATER 2 G: 40 INJECTION, SOLUTION INTRAVENOUS at 08:12

## 2019-12-08 RX ADMIN — POTASSIUM CHLORIDE 30 MEQ: 750 CAPSULE, EXTENDED RELEASE ORAL at 11:12

## 2019-12-08 RX ADMIN — BOSENTAN 125 MG: 125 TABLET, FILM COATED ORAL at 08:12

## 2019-12-08 RX ADMIN — WARFARIN SODIUM 10 MG: 5 TABLET ORAL at 04:12

## 2019-12-08 RX ADMIN — BOSENTAN 125 MG: 125 TABLET, FILM COATED ORAL at 09:12

## 2019-12-08 RX ADMIN — SILDENAFIL 20 MG: 20 TABLET ORAL at 02:12

## 2019-12-08 RX ADMIN — METOLAZONE 5 MG: 5 TABLET ORAL at 08:12

## 2019-12-08 NOTE — PROGRESS NOTES
Progress Note  Hospital Medicine    Provider team: [facility]  Admit Date: 12/7/2019  Encounter Date: 12/08/2019     SUBJECTIVE:     Follow-up Visit for: Acute decompensated heart failure    HPI (See H&P for complete P,F,SHx): 43 yo M with PMHx Severe pulmonary HTN, HFrEF (EF 48% 2/2019), CKD III, and Afib was in his usual state of health until the night prior to admission when the patient reports that he started feeling dizzy. He says that he would notice the dizziness anytime he got up to walk around. He describes the dizziness as a room spinning sensation with associated nausea. He reports not actually vomiting, but he did dry heave a few times earlier today. The patient reports that the symptoms were mild last night, but when he got out of bed this morning he was extremely dizzy. The symptoms did not improve, so he came to the ED this afternoon.    Interval history:  Patient admitted with diagnosis of ADHF and started on diuresis, but appears generally euvolemic. He describes vertiginous symptoms. Missy-Hallpike noted to be positive. Orthostasis is negative. No recent URI symptoms. Rest of ROS is otherwise negative. He did say he got some relief with meclizine.    Review of Systems:  Review of Systems   Constitutional: Negative for chills and fever.   HENT: Negative for congestion and sore throat.    Eyes: Negative for photophobia, pain and discharge.   Respiratory: Negative for cough, hemoptysis, sputum production and shortness of breath.    Cardiovascular: Negative for chest pain, palpitations and leg swelling.   Gastrointestinal: Negative for abdominal pain, diarrhea, nausea and vomiting.   Genitourinary: Negative for dysuria and urgency.   Musculoskeletal: Negative for myalgias and neck pain.   Skin: Negative for itching and rash.   Neurological: Positive for dizziness. Negative for sensory change, focal weakness and headaches.   Endo/Heme/Allergies: Negative for polydipsia. Does not bruise/bleed easily.  "  Psychiatric/Behavioral: Negative for depression and suicidal ideas.       OBJECTIVE:       Intake/Output Summary (Last 24 hours) at 12/8/2019 1029  Last data filed at 12/8/2019 0400  Gross per 24 hour   Intake 420 ml   Output 2650 ml   Net -2230 ml     Vital Signs Range (Last 24H):  Temp:  [97.3 °F (36.3 °C)-98.1 °F (36.7 °C)]   Pulse:  [67-92]   Resp:  [15-23]   BP: ()/(54-73)   SpO2:  [84 %-97 %]   Body mass index is 55.18 kg/m².    Objective:  General Appearance:  Comfortable, well-appearing, in no acute distress and not in pain.    Vital signs: (most recent): Blood pressure (!) 143/62, pulse 91, temperature 97.9 °F (36.6 °C), resp. rate 18, height 5' 5" (1.651 m), weight (!) 150.4 kg (331 lb 9.2 oz), SpO2 (!) 94 %.  No fever.    Output: Producing urine and producing stool.    HEENT: Normal HEENT exam.    Lungs:  Normal effort.  Breath sounds clear to auscultation.  He is not in respiratory distress.  No rales or wheezes.    Heart: Normal rate.  Regular rhythm.  S1 normal and S2 normal.  No murmur.   Chest: Symmetric chest wall expansion.   Abdomen: Abdomen is soft.  Bowel sounds are normal.   There is no abdominal tenderness.     Extremities: Normal range of motion.  There is no deformity, effusion, dependent edema or local swelling.    Pulses: Distal pulses are intact.    Neurological: Patient is alert and oriented to person, place and time.  Normal strength.    Pupils:  Pupils are equal, round, and reactive to light.    Skin:  Warm and dry.      Medications:  Medication list was reviewed in EPIC and changes noted under Assessment/Plan and MAR.    Laboratory:  Recent Labs     12/08/19  0430   WBC 9.01   RBC 4.79   HGB 13.8*   HCT 45.6      MCV 95   MCH 28.8   MCHC 30.3*   GRAN 73.9*  6.7   LYMPH 15.5*  1.4   MONO 7.3  0.7   EOS 0.3      Recent Labs     12/08/19  0430   GLU 94      K 3.3*   CL 94*   CO2 33*   BUN 23*   CREATININE 1.0   CALCIUM 8.8   ANIONGAP 12   MG 1.4* " "      ASSESSMENT/PLAN:     Active Hospital Problems    Diagnosis  POA    *Acute decompensated heart failure [I50.9]  Yes    Dizziness [R42]  Unknown    CKD (chronic kidney disease) stage 3, GFR 30-59 ml/min [N18.3]  Unknown    Paroxysmal atrial fibrillation [I48.0]  Yes    Pulmonary hypertension [I27.20]  Yes    Long term current use of anticoagulant therapy [Z79.01]  Not Applicable    Chronic cardiopulmonary disease [I27.9]  Yes      Resolved Hospital Problems   No resolved problems to display.      Dizziness  HFrEF  -Pt. Presenting with "room-spinning" dizziness, relative hypotension  -Orthostatic negative  -Cannot rule out vertigo as pt. Reports nausea and room spinning. melodie-hallpike did not elicit vertigo, however. Will try antivert as the medication is relatively benign  -Pt. Does show signs of pulmonary edema on CXR and physical exam  -Cards consulted in ED, will diurese per recommendations. Continue PO metolazone as well. Strict I&O's, monitor electrolytes and replace PRN     Pulmonary HTN  Chronic Hypoxic Respiratory Failure  -Pt. Followed by Dr. Karol Madison at pulm HTN clinic at [facility]  -Unknown WHO Group, pt. Had known MALLIKA/OHS, but pulm HTN was thought to be out of proportion to this, so pt. Started on ERA+PDE5  -Pressures have improved significantly since, most recent TTE estimate 24  -Recommendations per clinic were to continue ERA+PDE5 and MALLIKA treatment as it seems to have significantly improved the patient  -Continue home O2 at 3LNC and nightly CPAP  - Repeat 2D ECHO     Afib, paroxysmal  -Pt. In NSR on presentation  -Continue lopressor 25 mg BID for rate control and coumadin for AC                        CKD II  -Cr at baseline, 1.2  -Continue to monitor with daily BMP                DVT PPx: Coumadin     Anticipated discharge date and disposition:   Likely d/c in AM.  Kermit David MD  Hospital Medicine    "

## 2019-12-08 NOTE — CONSULTS
[facility]  Interventional Cardiology  Consult Note    Patient Name: Yong Mcintyre  MRN: [id number]  Admission Date: 12/7/2019  Hospital Length of Stay: 0 days  Code Status: Prior   Attending Provider: Lorna Saravia MD  Consulting Provider: Jorge L Teague MD  Primary Care Physician: Gianni Escalona MD  Principal Problem:Acute decompensated heart failure    Patient information was obtained from patient and ER records.     Consults  Subjective:     Chief Complaint:  Dizziness      HPI:  45 yo AAM presents for biweekly outpatient IV diuretic tx. He remains on Lasix 80 mg bid, Tracleer and Revatio and is on home O2 at 3 LPM.  Sees Dr. Head at [facility] once a year. HE comes to [facility] for biweekly lasix infusion.   Reports since yesterday feeling dizzy and unsteady with associated hypotension.   BP in the ED was low (100/50) and he was not orthostatic. His last lasix dose was yesterday. Didn't take any today because of hypotension. Patient reports diet indiscretion during the holidays. He is 4 lbs  Up from his dry weight.      No new subjective & objective note has been filed under this hospital service since the last note was generated.    Assessment and Plan:     * Acute decompensated heart failure  -Patient is not hypotensive on exam and he is not orthostatic either  -Lasix 80 IVP now  -Follow up UOP and syptoms; if patient feel better he should be able to go home and follow up with his cardiologist. If not, call cardiology for further recommendations.         VTE Risk Mitigation (From admission, onward)    None          Thank you for your consult. I will follow-up with patient. Please contact us if you have any additional questions.    Jorge L Teague MD  Interventional Cardiology   [facility]

## 2019-12-08 NOTE — ASSESSMENT & PLAN NOTE
-Patient is not hypotensive on exam and he is not orthostatic either  -Lasix 80 IVP now  -Follow up UOP and syptoms; if patient feel better he should be able to go home and follow up with his cardiologist. If not, call cardiology for further recommendations.

## 2019-12-08 NOTE — SUBJECTIVE & OBJECTIVE
"Past Medical History:   Diagnosis Date    Arthritis     CHF (congestive heart failure)     Cor pulmonale 11/5/2012    Diastolic dysfunction     Gallstones     Hypertension     Left ventricular systolic dysfunction 11/5/2012    Morbid obesity 11/5/2012    Obesity     MALLIKA (obstructive sleep apnea)     PFO (patent foramen ovale) 11/5/2012    Pickwickian syndrome 11/5/2012    Pulmonary hypertension        No past surgical history on file.    Review of patient's allergies indicates:   Allergen Reactions    Lipitor [atorvastatin] Other (See Comments)     "it makes my legs feel like jelly"  Other reaction(s): causes legs to hurt         (Not in a hospital admission)  Family History     Problem Relation (Age of Onset)    Arthritis Mother, Maternal Grandmother, Maternal Grandfather    Asthma Mother, Sister, Maternal Grandmother    Breast cancer Paternal Grandmother    Diabetes Maternal Grandmother    Hypertension Father, Maternal Grandmother, Maternal Grandfather, Paternal Grandfather    Mental illness Brother    Mental retardation Brother        Tobacco Use    Smoking status: Never Smoker    Smokeless tobacco: Never Used   Substance and Sexual Activity    Alcohol use: Yes     Comment: holidays  rare    Drug use: No    Sexual activity: Not on file     Review of Systems   Constitution: Negative.   Eyes: Negative.    Cardiovascular: Positive for dyspnea on exertion.   Respiratory: Negative.    Endocrine: Negative.    Gastrointestinal: Negative.    Genitourinary: Negative.      Objective:     Vital Signs (Most Recent):  Temp: 97.3 °F (36.3 °C) (12/07/19 1250)  Pulse: 71 (12/07/19 1654)  Resp: 19 (12/07/19 1654)  BP: 115/66 (12/07/19 1633)  SpO2: (!) 93 % (12/07/19 1654) Vital Signs (24h Range):  Temp:  [97.3 °F (36.3 °C)] 97.3 °F (36.3 °C)  Pulse:  [67-75] 71  Resp:  [15-21] 19  SpO2:  [84 %-96 %] 93 %  BP: ()/(54-66) 115/66     Weight: (!) 149.7 kg (330 lb)  Body mass index is 54.91 kg/m².    SpO2: (!) " 93 %  O2 Device (Oxygen Therapy): nasal cannula      Intake/Output Summary (Last 24 hours) at 12/7/2019 1807  Last data filed at 12/7/2019 1724  Gross per 24 hour   Intake --   Output 750 ml   Net -750 ml       Lines/Drains/Airways     Peripheral Intravenous Line                 Peripheral IV - Single Lumen 12/07/19 1322 18 G Right Antecubital less than 1 day                Physical Exam   Constitutional: He is oriented to person, place, and time. He appears well-developed and well-nourished.   HENT:   Head: Normocephalic and atraumatic.   Eyes: Conjunctivae are normal.   Neck: Neck supple. No JVD present. No thyromegaly present.   Cardiovascular: Normal rate, regular rhythm, normal heart sounds and intact distal pulses. Exam reveals no gallop and no friction rub.   No murmur heard.  Pulmonary/Chest: Effort normal and breath sounds normal. No respiratory distress. He has no wheezes. He has no rales.   Abdominal: Soft. Bowel sounds are normal. He exhibits no distension. There is no tenderness. There is no rebound.   Musculoskeletal: Normal range of motion.   Neurological: He is oriented to person, place, and time.   Skin: Skin is warm.   Nursing note and vitals reviewed.      Significant Labs:   BMP:   Recent Labs   Lab 12/07/19  1404   *      K 3.5   CL 93*   CO2 34*   BUN 30*   CREATININE 1.2   CALCIUM 9.0   , CMP   Recent Labs   Lab 12/07/19  1404      K 3.5   CL 93*   CO2 34*   *   BUN 30*   CREATININE 1.2   CALCIUM 9.0   PROT 8.3   ALBUMIN 2.7*   BILITOT 0.5   ALKPHOS 115   AST 24   ALT 19   ANIONGAP 11   ESTGFRAFRICA >60.0   EGFRNONAA >60.0   , Lipid Panel No results for input(s): CHOL, HDL, LDLCALC, TRIG, CHOLHDL in the last 48 hours., Troponin   Recent Labs   Lab 12/07/19  1404   TROPONINI <0.006   , All pertinent lab results from the last 24 hours have been reviewed. and   Recent Lab Results       12/07/19  1412   12/07/19  1404        Albumin   2.7     Alkaline Phosphatase   115      ALT   19     Anion Gap   11     Appearance, UA Clear       AST   24     Baso #   0.03     Basophil%   0.4     Bilirubin (UA) Negative       BILIRUBIN TOTAL   0.5  Comment:  For infants and newborns, interpretation of results should be based  on gestational age, weight and in agreement with clinical  observations.  Premature Infant recommended reference ranges:  Up to 24 hours.............<8.0 mg/dL  Up to 48 hours............<12.0 mg/dL  3-5 days..................<15.0 mg/dL  6-29 days.................<15.0 mg/dL       BNP   11  Comment:  Values of less than 100 pg/ml are consistent with non-CHF populations.     BUN, Bld   30     Calcium   9.0     Chloride   93     CO2   34     Color, UA Yellow       Creatinine   1.2     Differential Method   Automated     eGFR if    >60.0     eGFR if non    >60.0  Comment:  Calculation used to obtain the estimated glomerular filtration  rate (eGFR) is the CKD-EPI equation.        Eos #   0.1     Eosinophil%   1.6     Glucose   133     Glucose, UA Negative       Gran # (ANC)   6.4     Gran%   81.4     Hematocrit   46.6     Hemoglobin   13.8     Immature Grans (Abs)   0.03  Comment:  Mild elevation in immature granulocytes is non specific and   can be seen in a variety of conditions including stress response,   acute inflammation, trauma and pregnancy. Correlation with other   laboratory and clinical findings is essential.       Immature Granulocytes   0.4     Coumadin Monitoring INR   2.6  Comment:  Coumadin Therapy:  2.0 - 3.0 for INR for all indicators except mechanical heart valves  and antiphospholipid syndromes which should use 2.5 - 3.5.       Ketones, UA Negative       Leukocytes, UA Negative       Lymph #   0.8     Lymph%   10.5     MCH   28.5     MCHC   29.6     MCV   96     Mono #   0.5     Mono%   5.7     MPV   10.3     NITRITE UA Negative       nRBC   0     Occult Blood UA Negative       pH, UA 6.0       Platelets   210     Potassium    3.5     PROTEIN TOTAL   8.3     Protein, UA Negative  Comment:  Recommend a 24 hour urine protein or a urine   protein/creatinine ratio if globulin induced proteinuria is  clinically suspected.         Protime   25.1     RBC   4.84     RDW   14.7     Sodium   138     Specific Gravity, UA 1.010       Specimen UA Urine, Clean Catch       Troponin I   <0.006  Comment:  The reference interval for Troponin I represents the 99th percentile   cutoff   for our facility and is consistent with 3rd generation assay   performance.       WBC   7.91           Significant Imaging: Echocardiogram:   2D echo with color flow doppler:   Results for orders placed or performed during the hospital encounter of 04/13/16   2D echo with color flow doppler   Result Value Ref Range    QEF 48 55 - 65    Diastolic Dysfunction No     Narrative    TEST DESCRIPTION       Aorta: The aortic root is normal in size, measuring 2.5 cm at sinotubular junction and 2.5 cm at Sinuses of Valsalva. The proximal ascending aorta is normal in size, measuring 2.5 cm across.     Left Atrium: The left atrial volume index is normal, measuring 19.38 cc/m2.     Left Ventricle: The left ventricle is upper limit of normal, with an end-diastolic diameter of 5.0 cm, and an end-systolic diameter of 4.2 cm. Posterior wall thickness is upper limit of normal in size, with the septum measuring 0.9 cm and the posterior   wall measuring 1.1 cm across. Relative wall thickness was increased at 0.44, and the LV mass index was 100.1 g/m2 consistent with concentric remodeling. The following segments were mildly hypokinetic: mid anteroseptum, apical septum, apical inferior   wall, mid inferior wall.  The following segments were moderately hypokinetic: mid inferoseptum, basal inferoseptum, basal inferior wall.  Global left ventricular systolic function appears mildly depressed. Visually estimated ejection fraction is 45-50%. The LV Doppler derived stroke volume equals 65.0 ccs. There  is global hypokinesis.   The E/e'(lat) is 4, consistent with normal diastolic function.     Right Atrium: The right atrium is normal in size, measuring 4.2 cm in length and 3.2 cm in width in the apical view.     Right Ventricle: The right ventricle is normal in size measuring 3.0 cm at the base in the apical right ventricle-focused view. Global right ventricular systolic function appears low normal. There is abnormal septal motion. Tricuspid annular plane   systolic excursion (TAPSE) is 2.2 cm.     Aortic Valve:  The aortic valve is mildly sclerotic.     IVC: IVC is normal in size and collapses > 50% with a sniff, suggesting normal right atrial pressure of 3 mmHg.     Intracavitary: There is no evidence of pericardial effusion, intracavity mass, thrombi, or vegetation.         CONCLUSIONS     1 - Upper limit of normal left ventricular enlargement.     2 - Mildly depressed left ventricular systolic function (EF 45-50%).     3 - Concentric remodeling.     4 - Normal left ventricular diastolic function.     5 - Low normal right ventricular systolic function .             This document has been electronically    SIGNED BY: Jian Soria MD On: 04/14/2016 11:35    and Transthoracic echo (TTE) complete (Cupid Only):   Results for orders placed or performed during the hospital encounter of 03/29/19   Transthoracic echo (TTE) complete (Cupid Only)   Result Value Ref Range    Ascending aorta 2.80 cm    STJ 2.61 cm    AV mean gradient 2.70 mmHg    Ao peak rick 1.10 m/s    Ao VTI 19.52 cm    IVRT 0.08 msec    IVS 0.76 0.6 - 1.1 cm    LA size 3.43 cm    Left Atrium Major Axis 5.01 cm    Left Atrium Minor Axis 4.83 cm    LVIDD 5.68 3.5 - 6.0 cm    LVIDS 4.03 (A) 2.1 - 4.0 cm    LVOT diameter 2.80 cm    LVOT peak VTI 16.25 cm    PW 0.85 0.6 - 1.1 cm    MV Peak A Rick 0.52 m/s    E wave decelartion time 183.24 msec    MV Peak E Rick 0.47 m/s    PV Peak D Rick 0.52 m/s    PV Peak S Rick 0.34 m/s    RA Major Axis 4.60 cm    RA Width 4.72  cm    RVDD 4.70 cm    Sinus 3.38 cm    TAPSE 2.33 cm    TR Max Rick 2.05 m/s    TDI LATERAL 0.15     TDI SEPTAL 0.09     LA WIDTH 3.57 cm    LV Diastolic Volume 158.47 mL    LV Systolic Volume 71.23 mL    RV S' 11.78 m/s    LVOT peak rick [id number] m/s    LV LATERAL E/E' RATIO 3.13     LV SEPTAL E/E' RATIO 5.22     FS 29 %    LA volume 51.19 cm3    LV mass 170.47 g    Left Ventricle Relative Wall Thickness 0.30 cm    AV valve area 5.12 cm2    AV Velocity Ratio 0.75     AV index (prosthetic) 0.83     E/A ratio 0.90     Mean e' 0.12     Pulm vein S/D ratio 0.65     LVOT area 6.15 cm2    LVOT stroke volume 100.01 cm3    AV peak gradient 4.84 mmHg    E/E' ratio 3.92     Triscuspid Valve Regurgitation Peak Gradient 16.81 mmHg    BSA 2.64 m2    LV Systolic Volume Index 28.9 mL/m2    LV Diastolic Volume Index 64.34 mL/m2    LA Volume Index 20.8 mL/m2    LV Mass Index 69.2 g/m2    Right Atrial Pressure (from IVC) 3 mmHg    TV rest pulmonary artery pressure 20 mmHg    Narrative    · Mildly decreased left ventricular systolic function. The estimated   ejection fraction is 48% ( probably upper 40s to at most low 50s but lower   on the auto EF)  · Mild global hypokinetic wall motion.  · Septal wall has abnormal motion.  · Grade I (mild) left ventricular diastolic dysfunction consistent with   impaired relaxation.  · Normal left atrial pressure.  · Mild right ventricular enlargement.  · Normal right ventricular systolic function.  · Mild pulmonic regurgitation.  · Normal central venous pressure (3 mm Hg).  · The estimated PA systolic pressure is 20 mm Hg

## 2019-12-08 NOTE — PROGRESS NOTES
Bosentan scheduled for tonight cannot be verified until pharmacy has a certified pharmacist to verify medication that is on special plan. Talked to [place] on pharmacy and he stated that there will be pharmacist tomorrow. Pt informed and so is [facility]

## 2019-12-08 NOTE — PLAN OF CARE
Pt is A, A, Ox4. Calm, cooperative. Free of falls, trauma, and injuries. Skin intact. Pt educated on treatment plan. Pt demonstrates and verbalizes understanding. VSS. Pt still c/o dizziness. MD aware. Orthostatic BPs negative. 3L O2 per NC. Lytes replaced. Plan of care reviewed with pt.

## 2019-12-08 NOTE — HPI
43 yo AAM presents for biweekly outpatient IV diuretic tx. He remains on Lasix 80 mg bid, Tracleer and Revatio and is on home O2 at 3 LPM.  Sees Dr. Head at [facility] once a year. HE comes to [facility] for biweekly lasix infusion.   Reports since yesterday feeling dizzy and unsteady with associated hypotension.   BP in the ED was low (100/50) and he was not orthostatic. His last lasix dose was yesterday. Didn't take any today because of hypotension. Patient reports diet indiscretion during the holidays. He is 4 lbs  Up from his dry weight.

## 2019-12-08 NOTE — H&P
Hospital Medicine  History and Physical Exam    Team: Networked reference to record PCT  Lupillo Hood MD  Admit Date: 12/7/2019   Principal Problem:  Acute decompensated heart failure   Patient information was obtained from patient, past medical records and ER records.   Primary care Physician: Gianni Escalona MD  Code status: Full Code    HPI:   45 yo M with PMHx Severe pulmonary HTN, HFrEF (EF 48% 2/2019), CKD III, and Afib was in his usual state of health until the night prior to admission when the patient reports that he started feeling dizzy. He says that he would notice the dizziness anytime he got up to walk around. He describes the dizziness as a room spinning sensation with associated nausea. He reports not actually vomiting, but he did dry heave a few times earlier today. The patient reports that the symptoms were mild last night, but when he got out of bed this morning he was extremely dizzy. The symptoms did not improve, so he came to the ED this afternoon.    No results found for: HGBA1C    Past Medical History: Patient has a past medical history of Arthritis, CHF (congestive heart failure), Cor pulmonale (11/5/2012), Diastolic dysfunction, Gallstones, Hypertension, Left ventricular systolic dysfunction (11/5/2012), Morbid obesity (11/5/2012), Obesity, MALLIKA (obstructive sleep apnea), PFO (patent foramen ovale) (11/5/2012), Pickwickian syndrome (11/5/2012), and Pulmonary hypertension.    Past Surgical History: Patient has no past surgical history on file.    Social History: Patient reports that he has never smoked. He has never used smokeless tobacco. He reports that he drinks alcohol. He reports that he does not use drugs.    Family History: family history includes Arthritis in his maternal grandfather, maternal grandmother, and mother; Asthma in his maternal grandmother, mother, and sister; Breast cancer in his paternal grandmother; Diabetes in his maternal grandmother; Hypertension in his father,  maternal grandfather, maternal grandmother, and paternal grandfather; Mental illness in his brother; Mental retardation in his brother.    Medications: reviewed     Allergies: Patient is allergic to lipitor [atorvastatin].    ROS  Pain Scale: 0 /10   Constitutional: no fever or chills  Respiratory: no cough or shortness of breath  Cardiovascular: no chest pain or palpitations  Gastrointestinal: no nausea or vomiting, no abdominal pain or change in bowel habits  Genitourinary: no hematuria or dysuria  Integument/Breast: no rash or pruritis  Hematologic/Lymphatic: no easy bruising or lymphadenopathy  Musculoskeletal: no arthralgias or myalgias  Neurological: Positive for dizziness  Behavioral/Psych: no depression or anxiety    PEx  Temp:  [97.3 °F (36.3 °C)-97.5 °F (36.4 °C)]   Pulse:  [67-86]   Resp:  [15-21]   BP: ()/(54-73)   SpO2:  [84 %-96 %]   Body mass index is 54.91 kg/m².     Intake/Output Summary (Last 24 hours) at 12/7/2019 2006  Last data filed at 12/7/2019 1940  Gross per 24 hour   Intake --   Output 1150 ml   Net -1150 ml       General appearance: obese male in no distress, pt. Resting comforatbly  Mental status: Alert and oriented x 3  HEENT:  conjunctivae/corneas clear, PERRL  Neck: supple, thyroid not enlarged  Pulm:   normal respiratory effort, CTA B, no c/w/r  Card: RRR, S1, S2 normal, no murmur, click, rub or gallop  Abd: soft, NT, ND, BS present; no masses, no organomegaly  Ext: no c/c/e  Pulses: 2+, symmetric  Skin: color, texture, turgor normal. No rashes or lesions  Neuro: CN II-XII grossly intact, no focal numbness or weakness, normal strength and tone     Recent Results (from the past 24 hour(s))   CBC auto differential    Collection Time: 12/07/19  2:04 PM   Result Value Ref Range    WBC 7.91 3.90 - 12.70 K/uL    RBC 4.84 4.60 - 6.20 M/uL    Hemoglobin 13.8 (L) 14.0 - 18.0 g/dL    Hematocrit 46.6 40.0 - 54.0 %    Mean Corpuscular Volume 96 82 - 98 fL    Mean Corpuscular Hemoglobin 28.5  27.0 - 31.0 pg    Mean Corpuscular Hemoglobin Conc 29.6 (L) 32.0 - 36.0 g/dL    RDW 14.7 (H) 11.5 - 14.5 %    Platelets 210 150 - 350 K/uL    MPV 10.3 9.2 - 12.9 fL    Immature Granulocytes 0.4 0.0 - 0.5 %    Gran # (ANC) 6.4 1.8 - 7.7 K/uL    Immature Grans (Abs) 0.03 0.00 - 0.04 K/uL    Lymph # 0.8 (L) 1.0 - 4.8 K/uL    Mono # 0.5 0.3 - 1.0 K/uL    Eos # 0.1 0.0 - 0.5 K/uL    Baso # 0.03 0.00 - 0.20 K/uL    nRBC 0 0 /100 WBC    Gran% 81.4 (H) 38.0 - 73.0 %    Lymph% 10.5 (L) 18.0 - 48.0 %    Mono% 5.7 4.0 - 15.0 %    Eosinophil% 1.6 0.0 - 8.0 %    Basophil% 0.4 0.0 - 1.9 %    Differential Method Automated    Comprehensive metabolic panel    Collection Time: 12/07/19  2:04 PM   Result Value Ref Range    Sodium 138 136 - 145 mmol/L    Potassium 3.5 3.5 - 5.1 mmol/L    Chloride 93 (L) 95 - 110 mmol/L    CO2 34 (H) 23 - 29 mmol/L    Glucose 133 (H) 70 - 110 mg/dL    BUN, Bld 30 (H) 6 - 20 mg/dL    Creatinine 1.2 0.5 - 1.4 mg/dL    Calcium 9.0 8.7 - 10.5 mg/dL    Total Protein 8.3 6.0 - 8.4 g/dL    Albumin 2.7 (L) 3.5 - 5.2 g/dL    Total Bilirubin 0.5 0.1 - 1.0 mg/dL    Alkaline Phosphatase 115 55 - 135 U/L    AST 24 10 - 40 U/L    ALT 19 10 - 44 U/L    Anion Gap 11 8 - 16 mmol/L    eGFR if African American >60.0 >60 mL/min/1.73 m^2    eGFR if non African American >60.0 >60 mL/min/1.73 m^2   Brain natriuretic peptide    Collection Time: 12/07/19  2:04 PM   Result Value Ref Range    BNP 11 0 - 99 pg/mL   Protime-INR    Collection Time: 12/07/19  2:04 PM   Result Value Ref Range    Prothrombin Time 25.1 (H) 9.0 - 12.5 sec    INR 2.6 (H) 0.8 - 1.2   Troponin I    Collection Time: 12/07/19  2:04 PM   Result Value Ref Range    Troponin I <0.006 0.000 - 0.026 ng/mL   Urinalysis Only    Collection Time: 12/07/19  2:12 PM   Result Value Ref Range    Specimen UA Urine, Clean Catch     Color, UA Yellow Yellow, Straw, Shaneka    Appearance, UA Clear Clear    pH, UA 6.0 5.0 - 8.0    Specific Gravity, UA 1.010 1.005 - 1.030     "Protein, UA Negative Negative    Glucose, UA Negative Negative    Ketones, UA Negative Negative    Bilirubin (UA) Negative Negative    Occult Blood UA Negative Negative    Nitrite, UA Negative Negative    Leukocytes, UA Negative Negative       No results for input(s): POCTGLUCOSE in the last 168 hours.    Active Hospital Problems    Diagnosis  POA    *Acute decompensated heart failure [I50.9]  Yes    SOB (shortness of breath) [R06.02]  Yes      Resolved Hospital Problems   No resolved problems to display.       Assessment and Plan:  Dizziness  HFrEF  -Pt. Presenting with "room-spinning" dizziness, relative hypotension  -Orthostatic negative  -Cannot rule out vertigo as pt. Reports nausea and room spinning. melodie-hallpike did not elicit vertigo, however. Will try antivert as the medication is relatively benign  -Pt. Does show signs of pulmonary edema on CXR and physical exam  -Cards consulted in ED, will diurese per recommendations. Continue PO metolazone as well. Strict I&O's, monitor electrolytes and replace PRN    Pulmonary HTN  Chronic Hypoxic Respiratory Failure  -Pt. Followed by Dr. Dee at pulm HTN clinic at [facility]  -Unknown WHO Group, pt. Had known MALLIKA/OHS, but pulm HTN was thought to be out of proportion to this, so pt. Started on ERA+PDE5  -Pressures have improved significantly since, most recent TTE estimate 24  -Recommendations per clinic were to continue ERA+PDE5 and MALLIKA treatment as it seems to have significantly improved the patient  -Continue home O2 at 3LNC and nightly CPAP    Afib  -Pt. In NSR on presentation  -Continue lopressor 25 mg BID for rate control and coumadin for AC                        CKD II  -Cr at baseline, 1.2  -Continue to monitor with daily BMP                DVT PPx: Coumadin    Lupillo Hood MD  Hospital Medicine Staff  [phone] pager    "

## 2019-12-08 NOTE — CONSULTS
"[facility]  Interventional Cardiology  Consult Note    Patient Name: Yong Mcintyre  MRN: [id number]  Admission Date: 12/7/2019  Hospital Length of Stay: 0 days  Code Status: Prior   Attending Provider: Lorna Saravia MD  Consulting Provider: Jorge L Teague MD  Primary Care Physician: Gianni Escalona MD  Principal Problem:<principal problem not specified>    Patient information was obtained from patient and ER records.     Inpatient consult to Cardiology  Consult performed by: Jorge L Teague MD  Consult ordered by: Alyssa Case MD        Subjective:     Chief Complaint:  Dizziness     HPI:  43 yo AAM presents for biweekly outpatient IV diuretic tx. He remains on Lasix 80 mg bid, Tracleer and Revatio and is on home O2 at 3 LPM.  Sees Dr. Head at [facility] once a year. HE comes to [facility] for biweekly lasix infusion.   Reports since yesterday feeling dizzy and unsteady . Bp running low. CXR showed pulmonary edema. No orthostatic. His last lasix dose was yesterday. Didn't take any today because of hypotension.     Past Medical History:   Diagnosis Date    Arthritis     CHF (congestive heart failure)     Cor pulmonale 11/5/2012    Diastolic dysfunction     Gallstones     Hypertension     Left ventricular systolic dysfunction 11/5/2012    Morbid obesity 11/5/2012    Obesity     MALLIKA (obstructive sleep apnea)     PFO (patent foramen ovale) 11/5/2012    Pickwickian syndrome 11/5/2012    Pulmonary hypertension        No past surgical history on file.    Review of patient's allergies indicates:   Allergen Reactions    Lipitor [atorvastatin] Other (See Comments)     "it makes my legs feel like jelly"  Other reaction(s): causes legs to hurt         (Not in a hospital admission)  Family History     Problem Relation (Age of Onset)    Arthritis Mother, Maternal Grandmother, Maternal Grandfather    Asthma Mother, Sister, Maternal Grandmother    Breast cancer Paternal Grandmother    Diabetes " Maternal Grandmother    Hypertension Father, Maternal Grandmother, Maternal Grandfather, Paternal Grandfather    Mental illness Brother    Mental retardation Brother        Tobacco Use    Smoking status: Never Smoker    Smokeless tobacco: Never Used   Substance and Sexual Activity    Alcohol use: Yes     Comment: holidays  rare    Drug use: No    Sexual activity: Not on file     Review of Systems   Constitution: Negative.   Eyes: Negative.    Cardiovascular: Positive for dyspnea on exertion.   Respiratory: Negative.    Endocrine: Negative.    Gastrointestinal: Negative.    Genitourinary: Negative.      Objective:     Vital Signs (Most Recent):  Temp: 97.3 °F (36.3 °C) (12/07/19 1250)  Pulse: 71 (12/07/19 1654)  Resp: 19 (12/07/19 1654)  BP: 115/66 (12/07/19 1633)  SpO2: (!) 93 % (12/07/19 1654) Vital Signs (24h Range):  Temp:  [97.3 °F (36.3 °C)] 97.3 °F (36.3 °C)  Pulse:  [67-75] 71  Resp:  [15-21] 19  SpO2:  [84 %-96 %] 93 %  BP: ()/(54-66) 115/66     Weight: (!) 149.7 kg (330 lb)  Body mass index is 54.91 kg/m².    SpO2: (!) 93 %  O2 Device (Oxygen Therapy): nasal cannula      Intake/Output Summary (Last 24 hours) at 12/7/2019 1807  Last data filed at 12/7/2019 1724  Gross per 24 hour   Intake --   Output 750 ml   Net -750 ml       Lines/Drains/Airways     Peripheral Intravenous Line                 Peripheral IV - Single Lumen 12/07/19 1322 18 G Right Antecubital less than 1 day                Physical Exam   Constitutional: He is oriented to person, place, and time. He appears well-developed and well-nourished.   HENT:   Head: Normocephalic and atraumatic.   Eyes: Conjunctivae are normal.   Neck: Neck supple. No JVD present. No thyromegaly present.   Cardiovascular: Normal rate, regular rhythm, normal heart sounds and intact distal pulses. Exam reveals no gallop and no friction rub.   No murmur heard.  Pulmonary/Chest: Effort normal and breath sounds normal. No respiratory distress. He has no  wheezes. He has no rales.   Abdominal: Soft. Bowel sounds are normal. He exhibits no distension. There is no tenderness. There is no rebound.   Musculoskeletal: Normal range of motion.   Neurological: He is oriented to person, place, and time.   Skin: Skin is warm.   Nursing note and vitals reviewed.      Significant Labs:   BMP:   Recent Labs   Lab 12/07/19  1404   *      K 3.5   CL 93*   CO2 34*   BUN 30*   CREATININE 1.2   CALCIUM 9.0   , CMP   Recent Labs   Lab 12/07/19  1404      K 3.5   CL 93*   CO2 34*   *   BUN 30*   CREATININE 1.2   CALCIUM 9.0   PROT 8.3   ALBUMIN 2.7*   BILITOT 0.5   ALKPHOS 115   AST 24   ALT 19   ANIONGAP 11   ESTGFRAFRICA >60.0   EGFRNONAA >60.0   , Lipid Panel No results for input(s): CHOL, HDL, LDLCALC, TRIG, CHOLHDL in the last 48 hours., Troponin   Recent Labs   Lab 12/07/19  1404   TROPONINI <0.006   , All pertinent lab results from the last 24 hours have been reviewed. and   Recent Lab Results       12/07/19  1412   12/07/19  1404        Albumin   2.7     Alkaline Phosphatase   115     ALT   19     Anion Gap   11     Appearance, UA Clear       AST   24     Baso #   0.03     Basophil%   0.4     Bilirubin (UA) Negative       BILIRUBIN TOTAL   0.5  Comment:  For infants and newborns, interpretation of results should be based  on gestational age, weight and in agreement with clinical  observations.  Premature Infant recommended reference ranges:  Up to 24 hours.............<8.0 mg/dL  Up to 48 hours............<12.0 mg/dL  3-5 days..................<15.0 mg/dL  6-29 days.................<15.0 mg/dL       BNP   11  Comment:  Values of less than 100 pg/ml are consistent with non-CHF populations.     BUN, Bld   30     Calcium   9.0     Chloride   93     CO2   34     Color, UA Yellow       Creatinine   1.2     Differential Method   Automated     eGFR if    >60.0     eGFR if non    >60.0  Comment:  Calculation used to obtain the  estimated glomerular filtration  rate (eGFR) is the CKD-EPI equation.        Eos #   0.1     Eosinophil%   1.6     Glucose   133     Glucose, UA Negative       Gran # (ANC)   6.4     Gran%   81.4     Hematocrit   46.6     Hemoglobin   13.8     Immature Grans (Abs)   0.03  Comment:  Mild elevation in immature granulocytes is non specific and   can be seen in a variety of conditions including stress response,   acute inflammation, trauma and pregnancy. Correlation with other   laboratory and clinical findings is essential.       Immature Granulocytes   0.4     Coumadin Monitoring INR   2.6  Comment:  Coumadin Therapy:  2.0 - 3.0 for INR for all indicators except mechanical heart valves  and antiphospholipid syndromes which should use 2.5 - 3.5.       Ketones, UA Negative       Leukocytes, UA Negative       Lymph #   0.8     Lymph%   10.5     MCH   28.5     MCHC   29.6     MCV   96     Mono #   0.5     Mono%   5.7     MPV   10.3     NITRITE UA Negative       nRBC   0     Occult Blood UA Negative       pH, UA 6.0       Platelets   210     Potassium   3.5     PROTEIN TOTAL   8.3     Protein, UA Negative  Comment:  Recommend a 24 hour urine protein or a urine   protein/creatinine ratio if globulin induced proteinuria is  clinically suspected.         Protime   25.1     RBC   4.84     RDW   14.7     Sodium   138     Specific Gravity, UA 1.010       Specimen UA Urine, Clean Catch       Troponin I   <0.006  Comment:  The reference interval for Troponin I represents the 99th percentile   cutoff   for our facility and is consistent with 3rd generation assay   performance.       WBC   7.91           Significant Imaging: Echocardiogram:   2D echo with color flow doppler:   Results for orders placed or performed during the hospital encounter of 04/13/16   2D echo with color flow doppler   Result Value Ref Range    QEF 48 55 - 65    Diastolic Dysfunction No     Narrative    TEST DESCRIPTION       Aorta: The aortic root is normal  in size, measuring 2.5 cm at sinotubular junction and 2.5 cm at Sinuses of Valsalva. The proximal ascending aorta is normal in size, measuring 2.5 cm across.     Left Atrium: The left atrial volume index is normal, measuring 19.38 cc/m2.     Left Ventricle: The left ventricle is upper limit of normal, with an end-diastolic diameter of 5.0 cm, and an end-systolic diameter of 4.2 cm. Posterior wall thickness is upper limit of normal in size, with the septum measuring 0.9 cm and the posterior   wall measuring 1.1 cm across. Relative wall thickness was increased at 0.44, and the LV mass index was 100.1 g/m2 consistent with concentric remodeling. The following segments were mildly hypokinetic: mid anteroseptum, apical septum, apical inferior   wall, mid inferior wall.  The following segments were moderately hypokinetic: mid inferoseptum, basal inferoseptum, basal inferior wall.  Global left ventricular systolic function appears mildly depressed. Visually estimated ejection fraction is 45-50%. The LV Doppler derived stroke volume equals 65.0 ccs. There is global hypokinesis.   The E/e'(lat) is 4, consistent with normal diastolic function.     Right Atrium: The right atrium is normal in size, measuring 4.2 cm in length and 3.2 cm in width in the apical view.     Right Ventricle: The right ventricle is normal in size measuring 3.0 cm at the base in the apical right ventricle-focused view. Global right ventricular systolic function appears low normal. There is abnormal septal motion. Tricuspid annular plane   systolic excursion (TAPSE) is 2.2 cm.     Aortic Valve:  The aortic valve is mildly sclerotic.     IVC: IVC is normal in size and collapses > 50% with a sniff, suggesting normal right atrial pressure of 3 mmHg.     Intracavitary: There is no evidence of pericardial effusion, intracavity mass, thrombi, or vegetation.         CONCLUSIONS     1 - Upper limit of normal left ventricular enlargement.     2 - Mildly depressed  left ventricular systolic function (EF 45-50%).     3 - Concentric remodeling.     4 - Normal left ventricular diastolic function.     5 - Low normal right ventricular systolic function .             This document has been electronically    SIGNED BY: Jian Soria MD On: 04/14/2016 11:35    and Transthoracic echo (TTE) complete (Cupid Only):   Results for orders placed or performed during the hospital encounter of 03/29/19   Transthoracic echo (TTE) complete (Cupid Only)   Result Value Ref Range    Ascending aorta 2.80 cm    STJ 2.61 cm    AV mean gradient 2.70 mmHg    Ao peak rick 1.10 m/s    Ao VTI 19.52 cm    IVRT 0.08 msec    IVS 0.76 0.6 - 1.1 cm    LA size 3.43 cm    Left Atrium Major Axis 5.01 cm    Left Atrium Minor Axis 4.83 cm    LVIDD 5.68 3.5 - 6.0 cm    LVIDS 4.03 (A) 2.1 - 4.0 cm    LVOT diameter 2.80 cm    LVOT peak VTI 16.25 cm    PW 0.85 0.6 - 1.1 cm    MV Peak A Rick 0.52 m/s    E wave decelartion time 183.24 msec    MV Peak E Rick 0.47 m/s    PV Peak D Rick 0.52 m/s    PV Peak S Rick 0.34 m/s    RA Major Axis 4.60 cm    RA Width 4.72 cm    RVDD 4.70 cm    Sinus 3.38 cm    TAPSE 2.33 cm    TR Max Rick 2.05 m/s    TDI LATERAL 0.15     TDI SEPTAL 0.09     LA WIDTH 3.57 cm    LV Diastolic Volume 158.47 mL    LV Systolic Volume 71.23 mL    RV S' 11.78 m/s    LVOT peak rick [id number] m/s    LV LATERAL E/E' RATIO 3.13     LV SEPTAL E/E' RATIO 5.22     FS 29 %    LA volume 51.19 cm3    LV mass 170.47 g    Left Ventricle Relative Wall Thickness 0.30 cm    AV valve area 5.12 cm2    AV Velocity Ratio 0.75     AV index (prosthetic) 0.83     E/A ratio 0.90     Mean e' 0.12     Pulm vein S/D ratio 0.65     LVOT area 6.15 cm2    LVOT stroke volume 100.01 cm3    AV peak gradient 4.84 mmHg    E/E' ratio 3.92     Triscuspid Valve Regurgitation Peak Gradient 16.81 mmHg    BSA 2.64 m2    LV Systolic Volume Index 28.9 mL/m2    LV Diastolic Volume Index 64.34 mL/m2    LA Volume Index 20.8 mL/m2    LV Mass Index 69.2  g/m2    Right Atrial Pressure (from IVC) 3 mmHg    TV rest pulmonary artery pressure 20 mmHg    Narrative    · Mildly decreased left ventricular systolic function. The estimated   ejection fraction is 48% ( probably upper 40s to at most low 50s but lower   on the auto EF)  · Mild global hypokinetic wall motion.  · Septal wall has abnormal motion.  · Grade I (mild) left ventricular diastolic dysfunction consistent with   impaired relaxation.  · Normal left atrial pressure.  · Mild right ventricular enlargement.  · Normal right ventricular systolic function.  · Mild pulmonic regurgitation.  · Normal central venous pressure (3 mm Hg).  · The estimated PA systolic pressure is 20 mm Hg        Assessment and Plan:     No new Assessment & Plan notes have been filed under this hospital service since the last note was generated.  Service: Interventional Cardiology      VTE Risk Mitigation (From admission, onward)    None          Thank you for your consult. I will follow-up with patient. Please contact us if you have any additional questions.    Jorge L Teague MD  Interventional Cardiology   [facility]

## 2019-12-09 ENCOUNTER — ANTI-COAG VISIT (OUTPATIENT)
Dept: CARDIOLOGY | Facility: CLINIC | Age: 44
End: 2019-12-09
Payer: MEDICARE

## 2019-12-09 VITALS
DIASTOLIC BLOOD PRESSURE: 64 MMHG | WEIGHT: 315 LBS | RESPIRATION RATE: 18 BRPM | OXYGEN SATURATION: 91 % | BODY MASS INDEX: 52.48 KG/M2 | SYSTOLIC BLOOD PRESSURE: 129 MMHG | TEMPERATURE: 98 F | HEIGHT: 65 IN | HEART RATE: 80 BPM

## 2019-12-09 DIAGNOSIS — I27.9 CHRONIC PULMONARY HEART DISEASE: ICD-10-CM

## 2019-12-09 DIAGNOSIS — Z79.01 LONG TERM CURRENT USE OF ANTICOAGULANT THERAPY: ICD-10-CM

## 2019-12-09 LAB
ANION GAP SERPL CALC-SCNC: 9 MMOL/L (ref 8–16)
BASOPHILS # BLD AUTO: 0.03 K/UL (ref 0–0.2)
BASOPHILS NFR BLD: 0.4 % (ref 0–1.9)
BUN SERPL-MCNC: 22 MG/DL (ref 6–20)
CALCIUM SERPL-MCNC: 9.1 MG/DL (ref 8.7–10.5)
CHLORIDE SERPL-SCNC: 97 MMOL/L (ref 95–110)
CO2 SERPL-SCNC: 34 MMOL/L (ref 23–29)
CREAT SERPL-MCNC: 1.2 MG/DL (ref 0.5–1.4)
DIFFERENTIAL METHOD: ABNORMAL
EOSINOPHIL # BLD AUTO: 0.3 K/UL (ref 0–0.5)
EOSINOPHIL NFR BLD: 4.2 % (ref 0–8)
ERYTHROCYTE [DISTWIDTH] IN BLOOD BY AUTOMATED COUNT: 14.6 % (ref 11.5–14.5)
EST. GFR  (AFRICAN AMERICAN): >60 ML/MIN/1.73 M^2
EST. GFR  (NON AFRICAN AMERICAN): >60 ML/MIN/1.73 M^2
GLUCOSE SERPL-MCNC: 87 MG/DL (ref 70–110)
HCT VFR BLD AUTO: 47.2 % (ref 40–54)
HGB BLD-MCNC: 14.2 G/DL (ref 14–18)
IMM GRANULOCYTES # BLD AUTO: 0.01 K/UL (ref 0–0.04)
IMM GRANULOCYTES NFR BLD AUTO: 0.1 % (ref 0–0.5)
INR PPP: 2.3 (ref 0.8–1.2)
LYMPHOCYTES # BLD AUTO: 1.6 K/UL (ref 1–4.8)
LYMPHOCYTES NFR BLD: 20 % (ref 18–48)
MCH RBC QN AUTO: 29.3 PG (ref 27–31)
MCHC RBC AUTO-ENTMCNC: 30.1 G/DL (ref 32–36)
MCV RBC AUTO: 98 FL (ref 82–98)
MONOCYTES # BLD AUTO: 0.5 K/UL (ref 0.3–1)
MONOCYTES NFR BLD: 6.4 % (ref 4–15)
NEUTROPHILS # BLD AUTO: 5.6 K/UL (ref 1.8–7.7)
NEUTROPHILS NFR BLD: 68.9 % (ref 38–73)
NRBC BLD-RTO: 0 /100 WBC
PLATELET # BLD AUTO: 243 K/UL (ref 150–350)
PMV BLD AUTO: 10.3 FL (ref 9.2–12.9)
POTASSIUM SERPL-SCNC: 3.6 MMOL/L (ref 3.5–5.1)
PROTHROMBIN TIME: 22 SEC (ref 9–12.5)
RBC # BLD AUTO: 4.84 M/UL (ref 4.6–6.2)
SODIUM SERPL-SCNC: 140 MMOL/L (ref 136–145)
WBC # BLD AUTO: 8.18 K/UL (ref 3.9–12.7)

## 2019-12-09 PROCEDURE — 99239 PR HOSPITAL DISCHARGE DAY,>30 MIN: ICD-10-PCS | Mod: HCNC,,, | Performed by: HOSPITALIST

## 2019-12-09 PROCEDURE — 94660 CPAP INITIATION&MGMT: CPT | Mod: HCNC

## 2019-12-09 PROCEDURE — 25000003 PHARM REV CODE 250: Mod: HCNC | Performed by: HOSPITALIST

## 2019-12-09 PROCEDURE — 36415 COLL VENOUS BLD VENIPUNCTURE: CPT | Mod: HCNC

## 2019-12-09 PROCEDURE — 80048 BASIC METABOLIC PNL TOTAL CA: CPT | Mod: HCNC

## 2019-12-09 PROCEDURE — 27000221 HC OXYGEN, UP TO 24 HOURS: Mod: HCNC

## 2019-12-09 PROCEDURE — 85025 COMPLETE CBC W/AUTO DIFF WBC: CPT | Mod: HCNC

## 2019-12-09 PROCEDURE — 99239 HOSP IP/OBS DSCHRG MGMT >30: CPT | Mod: HCNC,,, | Performed by: HOSPITALIST

## 2019-12-09 PROCEDURE — 94761 N-INVAS EAR/PLS OXIMETRY MLT: CPT | Mod: HCNC

## 2019-12-09 PROCEDURE — 85610 PROTHROMBIN TIME: CPT | Mod: HCNC

## 2019-12-09 PROCEDURE — 93793 PR ANTICOAGULANT MGMT FOR PT TAKING WARFARIN: ICD-10-PCS | Mod: S$GLB,,,

## 2019-12-09 PROCEDURE — 93793 ANTICOAG MGMT PT WARFARIN: CPT | Mod: S$GLB,,,

## 2019-12-09 PROCEDURE — 99900035 HC TECH TIME PER 15 MIN (STAT): Mod: HCNC

## 2019-12-09 RX ORDER — WARFARIN 10 MG/1
TABLET ORAL
Start: 2019-12-09 | End: 2020-11-04 | Stop reason: SDUPTHER

## 2019-12-09 RX ORDER — ALBUTEROL SULFATE 90 UG/1
2 AEROSOL, METERED RESPIRATORY (INHALATION) EVERY 4 HOURS PRN
Qty: 18 G | Refills: 2 | Status: SHIPPED | OUTPATIENT
Start: 2019-12-09 | End: 2021-03-07 | Stop reason: SDUPTHER

## 2019-12-09 RX ORDER — MECLIZINE HYDROCHLORIDE 25 MG/1
25 TABLET ORAL 3 TIMES DAILY PRN
Qty: 15 TABLET | Refills: 0 | Status: SHIPPED | OUTPATIENT
Start: 2019-12-09 | End: 2020-10-13

## 2019-12-09 RX ORDER — POTASSIUM CHLORIDE 20 MEQ/1
40 TABLET, EXTENDED RELEASE ORAL ONCE
Status: COMPLETED | OUTPATIENT
Start: 2019-12-09 | End: 2019-12-09

## 2019-12-09 RX ADMIN — METOLAZONE 5 MG: 5 TABLET ORAL at 08:12

## 2019-12-09 RX ADMIN — POTASSIUM CHLORIDE 40 MEQ: 1500 TABLET, EXTENDED RELEASE ORAL at 10:12

## 2019-12-09 RX ADMIN — FLUTICASONE FUROATE AND VILANTEROL TRIFENATATE 1 PUFF: 100; 25 POWDER RESPIRATORY (INHALATION) at 09:12

## 2019-12-09 RX ADMIN — MECLIZINE HYDROCHLORIDE 25 MG: 25 TABLET ORAL at 11:12

## 2019-12-09 RX ADMIN — SILDENAFIL 20 MG: 20 TABLET ORAL at 08:12

## 2019-12-09 RX ADMIN — ALLOPURINOL 300 MG: 300 TABLET ORAL at 08:12

## 2019-12-09 RX ADMIN — MECLIZINE HYDROCHLORIDE 25 MG: 25 TABLET ORAL at 02:12

## 2019-12-09 RX ADMIN — METOPROLOL TARTRATE 25 MG: 25 TABLET ORAL at 08:12

## 2019-12-09 RX ADMIN — FUROSEMIDE 80 MG: 40 TABLET ORAL at 10:12

## 2019-12-09 RX ADMIN — BOSENTAN 125 MG: 125 TABLET, FILM COATED ORAL at 10:12

## 2019-12-09 NOTE — PLAN OF CARE
Pt is A, A, Ox4. Calm, cooperative. Free of falls, trauma, and injuries. Skin intact. Pt educated on treatment plan. Pt demonstrates and verbalizes understanding. VSS. Pt still c/o dizziness. MD aware. Meclizine given. 3L O2 per NC. Plan of care reviewed with pt.

## 2019-12-09 NOTE — PLAN OF CARE
12/09/19 1026   Discharge Assessment   Assessment Type Discharge Planning Assessment   Confirmed/corrected address and phone number on facesheet? Yes   Assessment information obtained from? Patient;Medical Record   Expected Length of Stay (days) 2   Communicated expected length of stay with patient/caregiver yes   Prior to hospitilization cognitive status: Alert/Oriented   Prior to hospitalization functional status: Independent   Current cognitive status: Alert/Oriented   Current Functional Status: Independent   Lives With alone   Able to Return to Prior Arrangements yes   Is patient able to care for self after discharge? Yes   Patient's perception of discharge disposition home or selfcare   Readmission Within the Last 30 Days no previous admission in last 30 days   Patient currently being followed by outpatient case management? No   Patient currently receives any other outside agency services? No   Equipment Currently Used at Home CPAP;oxygen   Do you have any problems affording any of your prescribed medications? TBD   Is the patient taking medications as prescribed? yes   Does the patient have transportation home? Yes   Transportation Anticipated family or friend will provide   Does the patient receive services at the [facility]? Yes   Discharge Plan A Home   DME Needed Upon Discharge  none   Patient/Family in Agreement with Plan yes   Admitted with CHF. Lives alone and is independent in his ADLs. Plan is to DC home. No DC needs identified.

## 2019-12-09 NOTE — PLAN OF CARE
12/09/19 1030   Post-Acute Status   Post-Acute Authorization Other   Other Status No Post-Acute Service Needs

## 2019-12-09 NOTE — DISCHARGE SUMMARY
Discharge Summary  Hospital Medicine    Attending Provider on Discharge: Kermit David     Discharging Team: [facility]     Date of Admission:  12/7/2019         Date of Discharge:        Diagnoses:     Principal Problem(s):   Acute decompensated heart failure     Secondary Problems:  Active Hospital Problems    Diagnosis    *Acute decompensated heart failure    Dizziness    CKD (chronic kidney disease) stage 3, GFR 30-59 ml/min    Paroxysmal atrial fibrillation    Pulmonary hypertension    Long term current use of anticoagulant therapy    Chronic cardiopulmonary disease        Hospital Course:    HPI (See H&P for complete P,F,SHx):   45 yo M with PMHx severe pulmonary HTN on home O2 3L, HFrEF (EF 48% 2/2019), CKD III, and Afib was in his usual state of health until the night prior to admission when the patient reports that he started feeling dizzy. He says that he would notice the dizziness anytime he got up to walk around. He describes the dizziness as a room spinning sensation with associated nausea. He reports not actually vomiting, but he did dry heave a few times earlier today. The patient reports that the symptoms were mild last night, but when he got out of bed this morning he was extremely dizzy. The symptoms did not improve, so he came to the ED this afternoon.     Hospital course:  Patient admitted with diagnosis of ADHF and started on diuresis, but appears generally euvolemic. He describes vertiginous symptoms. Missy-Hallpike noted to be positive. Orthostasis is negative. Does have some recent URI symptoms. He did say he got some relief with meclizine.  I suspect BPPV may actually be responsible for his symptoms as he appears euvolemic, on home O2, and dizziness does appear to be improving. Suggested to patient f/u with PCP within 1 week to re-evaluate symptoms.  All questions answered to patient satisfaction. Please see below for further details:    Review of Systems   Constitutional:  "Negative for chills and fever.   HENT: Negative for congestion and sore throat.    Eyes: Negative for photophobia, pain and discharge.   Respiratory: Negative for cough, hemoptysis, sputum production and shortness of breath.    Cardiovascular: Negative for chest pain, palpitations and leg swelling.   Gastrointestinal: Negative for abdominal pain, diarrhea, nausea and vomiting.   Genitourinary: Negative for dysuria and urgency.   Musculoskeletal: Negative for myalgias and neck pain.   Skin: Negative for itching and rash.   Neurological: Positive for dizziness. Negative for sensory change, focal weakness and headaches.   Endo/Heme/Allergies: Negative for polydipsia. Does not bruise/bleed easily.   Psychiatric/Behavioral: Negative for depression and suicidal ideas.     Physical Exam   Constitutional: He is oriented to person, place, and time. No distress.   HENT:   Head: Normocephalic and atraumatic.   Eyes: Pupils are equal, round, and reactive to light. Conjunctivae are normal.   Neck: Normal range of motion. Neck supple. No JVD present. No tracheal deviation present.   Cardiovascular: Normal rate, regular rhythm, normal heart sounds and intact distal pulses.   No murmur heard.  Pulmonary/Chest: Effort normal and breath sounds normal. No respiratory distress. He has no wheezes.   Abdominal: Soft. Bowel sounds are normal. He exhibits no distension. There is no tenderness.   Musculoskeletal: Normal range of motion. He exhibits no edema or tenderness.   Neurological: He is alert and oriented to person, place, and time. He exhibits normal muscle tone.   Skin: Skin is warm and dry. He is not diaphoretic. No erythema.     Dizziness  HFrEF  -Pt. Presenting with "room-spinning" dizziness, relative hypotension  -Orthostatic negative  -Cannot rule out vertigo as pt. Reports nausea and room spinning. melodie-hallpike did elicit vertigo. Will try antivert as the medication is relatively benign  -Pt. Does show signs of pulmonary " edema on CXR and physical exam  -Cards consulted in ED, will diurese per recommendations. Continue PO metolazone as well. Strict I&O's, monitor electrolytes and replace PRN     Pulmonary HTN  Chronic Hypoxic Respiratory Failure  -Pt. Followed by Dr. Karol Madison at pulm HTN clinic at [facility]  -Unknown WHO Group, pt. Had known MALLIKA/OHS, but pulm HTN was thought to be out of proportion to this, so pt. Started on ERA+PDE5  -Pressures have improved significantly since, most recent TTE estimate 24  -Recommendations per clinic were to continue ERA+PDE5 and MALLIKA treatment as it seems to have significantly improved the patient  -Continue home O2 at 3LNC and nightly CPAP  - Repeat 2D ECHO (can occur as outpatient)     Afib, paroxysmal  -Pt. In NSR on presentation  -Continue lopressor 25 mg BID for rate control and coumadin for AC                        CKD II  -Cr at baseline, 1.2  -Continue to monitor with daily BMP                Significant Diagnostic Studies:   Labs:   Recent Labs     12/09/19  0343   WBC 8.18   RBC 4.84   HGB 14.2   HCT 47.2      MCV 98   MCH 29.3   MCHC 30.1*   GRAN 68.9  5.6   LYMPH 20.0  1.6   MONO 6.4  0.5   EOS 0.3      Recent Labs     12/08/19  0430 12/09/19  0343   GLU 94 87    140   K 3.3* 3.6   CL 94* 97   CO2 33* 34*   BUN 23* 22*   CREATININE 1.0 1.2   CALCIUM 8.8 9.1   ANIONGAP 12 9   MG 1.4*  --      Radiology:   Results for orders placed during the hospital encounter of 03/06/14   X-Ray Chest 1 View    Narrative Findings: A central line in SVC.  There is cardiomegaly mild edema and no change.    Impression  Mild CHF.      Electronically signed by: JUDI PATRICK  Date:     03/11/14  Time:    08:03       Results for orders placed during the hospital encounter of 12/07/19   X-Ray Chest PA And Lateral    Narrative EXAMINATION:  XR CHEST PA AND LATERAL    CLINICAL HISTORY:  Hypoxemia    TECHNIQUE:  PA and lateral views of the chest were performed.    COMPARISON:  Chest radiograph  10/15/2019    FINDINGS:  Monitoring leads overlie the chest.  Large body habitus.  Patient is slightly rotated.    Cardiac silhouette is enlarged similar to prior.  There is prominence of the central pulmonary vasculature with bilateral diffuse nonspecific mild interstitial coarsening with a perihilar and basilar distribution suggesting pulmonary edema/CHF pattern.  The lungs are otherwise well expanded without focal consolidation, pleural effusion or pneumothorax.  Upper mediastinal contours are within normal limits.  Osseous structures appear stable without acute process seen.  Upper abdominal surgical clips again noted.      Impression Findings suggesting pulmonary edema/CHF pattern, without focal consolidation.      Electronically signed by: Axel Beck MD  Date:    12/07/2019  Time:    14:37      Results for orders placed during the hospital encounter of 10/12/12   CT Abdomen Pelvis With Contrast    Narrative DATE OF EXAM: Oct 12 2012      CT    0155  -  CT ABDOMEN PELVIS WITH CONTRAST:   \  [id number]     CLINICAL HISTORY:   \ABDOMINAL PAIN     PROCEDURE COMMENT:   \     ICD 9 CODE(S):   (\)     CPT 4 CODE(S)/MODIFIER(S):   (\)     Comparison:None     Technique:3-mm axial images of the chest were obtained after the   administration of 120 cc of Omni 350 IV contrast with sagittal and   coronal reformats obtained from the data.  Then axial images of the   abdomen and pelvis obtained after the administration of oral contrast.     Findings:     CT chest:     There is poor opacification of the pulmonary vascular tree which is   likely secondary to patient's body habitus and contrast bolus timing.   Examination is suboptimal.  There is no evidence of a central pulmonary   thromboembolus involving the main or central pulmonary arteries.  The   lobar, segmental and subsegmental vessels cannot be fully evaluated.     The heart is enlarged.  There is no significant pleural or pericardial   fluid.  There is mosaic and  ground glass attenuation in the lungs   bilaterally which is suggestive of small airways or small vessel disease   and may represent pulmonary edema.  There is volume loss and patchy   opacity within the left lower lobe which likely relates to atelectasis.    There are also patchy opacities scattered throughout both lungs.  There   is a more focal nodular opacity in the anterior segment of the right   upper lobe which measures 8 mm.  While this may represent an area of   consolidation, follow-up is recommended to exclude neoplasm.  There are   normal sized lymph nodes in the mediastinum.  The trachea and bronchi are   patent.  The esophagus is normal in caliber.     CT abdomen:     The liver, gallbladder, stomach, spleen, pancreas, and adrenal glands   demonstrate no significant abnormality.  The kidneys are normal in size   and attenuation.  There is no evidence of hydronephrosis.  The urinary   bladder is decompressed but grossly unremarkable.     There is no free air, free fluid, or ascites.  The small and large bowel   show no evidence of inflammation or obstruction.  The appendix is not   definitely visualize.  There are no inflammatory changes in the region of   the appendix.  There is no abnormal abdominal or retroperitoneal lymph   node enlargement.     The aorta is normal in caliber.  The bones demonstrate no significant   abnormality.  There is no evidence of acute fracture.        Impression:     1.Suboptimal evaluation for pulmonary thromboembolism secondary to body   habitus and contrast bolus timing.  There is no large central pulmonary   thromboembolism.  The distal pulmonary arteries cannot be fully evaluated.     2.No acute abnormality in the abdomen or pelvis to explain patient's   abdominal pain.     3.Groundglass and mosaic attenuation in the lungs may represent small   airways or small vessel disease or could reflect pulmonary edema.  There   is also cardiomegaly.     4.Scattered areas of  atelectasis and consolidation.  More focal 8mm   nodule in the right upper lobe.  Follow-up is recommended to exclude   neoplasm.  ______________________________________      Electronically signed by resident: Artem Flannery  Date:     10/12/12  Time:    15:06      ______________________________________      Electronically signed by resident: Artem Flannery  Date:     10/12/12  Time:    15:07         ______________________________________      Electronically signed by: Tania Frias MD  Date:     10/12/12  Time:    16:41            : EMEKA  Transcribe Date/Time: Oct 12 2012  4:42P  Dictated by : ARTEM FLANNERY MD  Read On: Oct 12 2012  3:06P  \  Images were reviewed, findings were verified and document was   electronically  SIGNED BY: TANIA FRIAS MD On: Oct 12 2012  4:42P   ARTEM FLANNERY MD      Results for orders placed during the hospital encounter of 02/18/14   CT Head Without Contrast    Narrative CT brain without contrast.    Comparison: None     Technique: Multiple 5 mm axial images of the head were obtained without intravenous contrast.    Findings: Hyperdensity along the intracranial cerebral vasculature diffusely which may be related to retained contrast from prior contrast infusion were more likely related to hemoconcentration.  This limits evaluation for subtle subarachnoid hemorrhage.    No evidence for parenchymal hemorrhage or hydrocephalus.  No midline shift.  Clinical correlation and follow-up advised.    There is slight inferior extension of the cerebral tonsils below the foramen magnum suggestive for tonsillar ectopia versus Chiari malformation.    Lobular foci in the bilateral maxillary antra partially visualized suggestive for mucous retention cysts    Impression  Subtle hyperdensity within the intracranial vasculature diffusely suggestive for recently administered contrast versus hemoconcentration.  Please note this limits evaluation for subtle subarachnoid hemorrhage.  there is no  evidence for parenchymal   hemorrhage clinical correlation and follow-up advised.    Limitation of the craniocervical junction secondary to beam hardening with question tonsillar ectopia versus Chiari malformation.  No evidence for hydrocephalus.    Lobular foci maxillary antrum most compatible with mucous retention cysts.      Electronically signed by: NIDA HURTADO DO  Date:     02/19/14  Time:    14:25      Cardiac Studies: None    Significant Treatments/Procedures:   None    Consults while in Hospital:   Cardiology    Discharge Medications:      Current Discharge Medication List      START taking these medications    Details   meclizine (ANTIVERT) 25 mg tablet Take 1 tablet (25 mg total) by mouth 3 (three) times daily as needed for Dizziness.  Qty: 15 tablet, Refills: 0         CONTINUE these medications which have NOT CHANGED    Details   allopurinol (ZYLOPRIM) 300 MG tablet Take 1 tablet (300 mg total) by mouth once daily.  Qty: 90 tablet, Refills: 3    Associated Diagnoses: Primary pulmonary hypertension; Atrial fibrillation, unspecified type; Chronic pulmonary heart disease; Pulmonary hypertension; PFO (patent foramen ovale); Paroxysmal atrial fibrillation; Cor pulmonale; Pickwickian syndrome; Hyperthyroidism      bosentan (TRACLEER) 125 MG Tab Take 1 tablet (125 mg total) by mouth every 12 (twelve) hours.  Qty: 60 tablet, Refills: 4    Associated Diagnoses: Other chronic pulmonary heart diseases      furosemide (LASIX) 80 MG tablet Take 1 tablet (80 mg total) by mouth 2 (two) times daily.  Qty: 180 tablet, Refills: 3    Associated Diagnoses: Chronic pulmonary heart disease      magnesium oxide (MAG-OX) 400 mg tablet Take 1 tablet (400 mg total) by mouth 2 (two) times daily.  Qty: 60 tablet, Refills: 11    Associated Diagnoses: Chronic cardiopulmonary disease; Chronic pulmonary heart disease; Long term current use of anticoagulant therapy; Cor pulmonale; Pickwickian syndrome; MALLIKA (obstructive sleep apnea);  Morbid obesity      metoprolol tartrate (LOPRESSOR) 25 MG tablet TAKE 1 TABLET BY MOUTH TWICE DAILY  Qty: 180 tablet, Refills: 3      potassium chloride (MICRO-K) 10 MEQ CpSR Take 1 capsule (10 mEq total) by mouth once daily.  Qty: 30 capsule, Refills: 11      warfarin (COUMADIN) 10 MG tablet Take 10 mg everyday except 15 mg every Thursday or as directed  Qty: 45 tablet, Refills: 11    Associated Diagnoses: Chronic cardiopulmonary disease; Chronic pulmonary heart disease; Long term current use of anticoagulant therapy; Cor pulmonale; Pickwickian syndrome; MALLIKA (obstructive sleep apnea); Morbid obesity      ADVAIR DISKUS 250-50 mcg/dose diskus inhaler inhale 1 dose by mouth twice a day  Qty: 60 each, Refills: 3      cholecalciferol, vitamin D3, (VITAMIN D3) 2,000 unit Cap Take 1 capsule by mouth.      diclofenac sodium (VOLTAREN) 1 % Gel Apply 2 g topically 4 (four) times daily. for 10 days  Qty: 100 g, Refills: 0    Associated Diagnoses: Right foot pain      metOLazone (ZAROXOLYN) 5 MG tablet TK 1 T PO  QAM  Refills: 0      multivitamin (THERAGRAN) per tablet Take by mouth.      sildenafil (REVATIO) 20 mg Tab Take 20 mg by mouth 3 (three) times daily.       VENTOLIN HFA 90 mcg/actuation inhaler TAKE 2 PUFFS BY MOUTH EVERY 6 HOURS AS NEEDED FOR WHEEZING OR SHORTNESS OF BREATH  Qty: 18 g, Refills: 0    Associated Diagnoses: Chronic pulmonary heart disease         STOP taking these medications       FLUCELVAX QUAD 1773-2991, PF, 60 mcg (15 mcg x 4)/0.5 mL Syrg vaccine Comments:   Reason for Stopping:         PNEUMOVAX 23 25 mcg/0.5 mL injection Comments:   Reason for Stopping:              Discharge Diet:cardiac diet and fluid restriction: 1500 cc     Activity: activity as tolerated    Discharged Condition: Stable    Discharge Disposition: Home or Self Care    Follow-up:   Future Appointments   Date Time Provider Department Center   12/9/2019  1:45 PM ECHO, [facility]     F/u PCP 1  week.    Studies/Results pending on discharge:   None    35 minutes.    Kermit David MD  [facility]   meconium staining

## 2019-12-09 NOTE — PROGRESS NOTES
Patient admitted for CHF 12/7/19 - 12/9/19, per discharge AVS started on Antivert and Coumadin 10 mg tab to take 1-1/5 tabs on Thursday with 1 tab all other days, calender updated on doses given during admit.  Chart routed to pharmacist to review.

## 2019-12-09 NOTE — NURSING
Patient remains free from falls and injuries through out shift. Patient telemetry removed. IV removed with catheter tip in place. Prescription delivered to bedside.   Patient VSS. Discharge instructions, medication list reviewed, and patient verbalizes understanding. Patient awaiting transport with will continue to monitor.

## 2019-12-10 NOTE — PLAN OF CARE
12/10/19 0700   Final Note   Assessment Type Final Discharge Note   Anticipated Discharge Disposition Home

## 2019-12-11 ENCOUNTER — PATIENT OUTREACH (OUTPATIENT)
Dept: ADMINISTRATIVE | Facility: CLINIC | Age: 44
End: 2019-12-11

## 2019-12-11 DIAGNOSIS — I50.9 CONGESTIVE HEART FAILURE, UNSPECIFIED HF CHRONICITY, UNSPECIFIED HEART FAILURE TYPE: Primary | ICD-10-CM

## 2019-12-11 NOTE — PATIENT INSTRUCTIONS
Right-Sided Congestive Heart Failure (CHF)    The heart is a large muscle that pumps blood throughout the body. Blood carries oxygen to all the organs (including the brain), muscles, and skin. After the body takes the oxygen out of the blood, the blood returns to the heart. The right side of the heart collects that blood and pumps it to the lungs to get fresh oxygen. This oxygen-rich blood from the lungs then returns to the left side of the heart, where it is pumped back out to the rest of the body and the brain, starting the process all over.  Heart failure (HF) occurs when the heart muscle is weakened. This can occur after a heart attack or with disease of the coronary arteries that affects the heart over time, and in turn affects the pumping action of the heart.  When the right side of the heart is weakened, it cant handle the blood it is getting from the rest of the body. This blood returns to the heart through veins. When too much pressure builds up in the veins, fluid leaks out into the tissues. Gravity then causes that fluid to spread to those parts of the body that are the lowest. So one of the first symptoms of heart failure is swelling in the feet and ankles. If the condition worsens, the swelling can even go up past the knees. In more severe cases, the liver may also become congested with extra fluid.  Causes of right-sided heart failure  · Coronary artery disease  · Heart attack in the past (heart attack is also known as acute myocardial infarction, or AMI)  · High blood pressure, particularly in the pulmonary circulation  · Damaged heart valve  · Diabetes  · Obesity  · Cigarette smoking  · Alcohol abuse  · Increased pressure of the lungs weakening the right side of the heart  Treatment  Heart failure is a chronic condition. There is no cure. The purpose of medical treatment is to improve the pumping action of the heart, and remove excess water and fluid from the body. A number of medicines can help  reach this goal, improve symptoms, and prevent the heart from becoming weaker. In some cases of severe heart failure, mechanical devices can be implanted to help with the heart's pumping function. Another major goal is to better treat the causes of heart failure, such as diabetes, high blood pressure, and your lifestyle.  Home care  · Check your weight every day. A sudden increase in weight gain could mean worsening heart failure.  ¨ Use the same scale every day.  ¨ Weigh yourself at the same time every day.  ¨ Make sure the scale is on the floor, not on a rug.  ¨ Keep a record of your weight every day so your healthcare provider can see it. If you are not given a log sheet for this, keep a separate journal for this purpose.   · Cut back on how much salt (sodium) you eat:  ¨ Your healthcare provider will tell you  what level of salt you can have daily, usually 2,000 mg or less.  ¨ Avoid high-salt foods. These include olives, pickles, smoked meats, processed foods, and salted potato chips.  ¨ Don't add salt to your food at the table. Use only small amounts of salt when cooking.  · Follow your healthcare provider's recommendations about how much fluid you should have.  · Stop smoking.  · Cut back on the amount of alcohol you drink.  · Lose weight if you are overweight. The excess weight adds a lot of stress on the workload of the heart.  · Stay active. Talk with your provider about an exercise program that is safe for your heart.  · Keep your feet elevated to reduce swelling. Ask your provider about support hose as a preventive treatment for daytime leg swelling.  · Follow your healthcare provider's instructions closely.  Besides taking your medicine as instructed, an important part of treatment is lifestyle changes. These include diet, physical activity, stopping smoking, and weight control.  Improve your diet. Often in the hospital, people are given a heart healthy diet. This includes more fresh foods, lower fat,  less processed foods, and lower salt.  Follow-up care  Follow up with your healthcare provider, or as advised. Make sure to keep any appointments that were made for you. This can help better control heart failure.  If an X-ray was done, you will be told of any new findings that may affect your care.  Call 911  Call 911 if you:  · Become severely short of breath  · Feel lightheaded, or feel like you might pass out or faint  · Have chest pain or discomfort that's different than usual, the medicines your provider told you to use for this don't help, or the pain lasts longer than 10 to 15 minutes  · You suddenly develop a rapid heart rate  When to seek medical advice  Call your healthcare provider right away if you have any of these signs. They may mean your heart failure is getting worse:  · Sudden weight gain. This means more than 2 or more pounds in 1 day or 5 pounds in 1 week, or whatever weight gain you were told to report by your provider.  · Trouble breathing not related to being active  · New or increased swelling of your legs or ankles  · Swelling or pain in your abdomen  · Breathing trouble at night. This means waking up short of breath or needing more pillows to elevate your upper body to breathe.  · Frequent coughing that doesnt go away  · Feeling much more tired than usual    © 0977-9750 [employer]. [address] [place] [address], [place] [address]. All rights reserved. This information is not intended as a substitute for professional medical care. Always follow your healthcare professional's instructions.

## 2019-12-11 NOTE — PROGRESS NOTES
C3 nurse attempted to contact patient. No answer. The following message was left for the patient to return the call:    Good Morning,  I am a nurse calling on behalf of [facility] from the [facility].  This is a Transitional Care Call for Yong Mcintyre. When you have a moment please contact us at [phone] or [phone] Monday through Friday, between the hours of 8 am to 4 pm. We look forward to speaking with you. On behalf of [facility] have a nice day.    The patient does not have a scheduled HOSFU appointment within 7-14 days post hospital discharge date 12/9/19. Message sent to Physician staff to assist with HOSFU appointment scheduling.

## 2020-01-03 ENCOUNTER — PATIENT OUTREACH (OUTPATIENT)
Dept: ADMINISTRATIVE | Facility: HOSPITAL | Age: 45
End: 2020-01-03

## 2020-01-13 ENCOUNTER — PES CALL (OUTPATIENT)
Dept: ADMINISTRATIVE | Facility: CLINIC | Age: 45
End: 2020-01-13

## 2020-01-20 ENCOUNTER — CARE AT HOME (OUTPATIENT)
Dept: HOME HEALTH SERVICES | Facility: CLINIC | Age: 45
End: 2020-01-20
Payer: MEDICARE

## 2020-01-20 VITALS
HEART RATE: 65 BPM | RESPIRATION RATE: 20 BRPM | SYSTOLIC BLOOD PRESSURE: 146 MMHG | DIASTOLIC BLOOD PRESSURE: 87 MMHG | BODY MASS INDEX: 52.48 KG/M2 | OXYGEN SATURATION: 96 % | HEIGHT: 65 IN | WEIGHT: 315 LBS | TEMPERATURE: 98 F

## 2020-01-20 DIAGNOSIS — Z99.81 OXYGEN DEPENDENT: ICD-10-CM

## 2020-01-20 DIAGNOSIS — Z79.01 LONG TERM CURRENT USE OF ANTICOAGULANT THERAPY: ICD-10-CM

## 2020-01-20 DIAGNOSIS — Q21.12 PFO (PATENT FORAMEN OVALE): ICD-10-CM

## 2020-01-20 DIAGNOSIS — N18.30 CKD (CHRONIC KIDNEY DISEASE) STAGE 3, GFR 30-59 ML/MIN: ICD-10-CM

## 2020-01-20 DIAGNOSIS — R06.02 SOB (SHORTNESS OF BREATH): ICD-10-CM

## 2020-01-20 DIAGNOSIS — J96.22 ACUTE AND CHRONIC RESPIRATORY FAILURE WITH HYPERCAPNIA: ICD-10-CM

## 2020-01-20 DIAGNOSIS — I27.9 CHRONIC PULMONARY HEART DISEASE: Primary | ICD-10-CM

## 2020-01-20 DIAGNOSIS — I48.0 PAROXYSMAL ATRIAL FIBRILLATION: ICD-10-CM

## 2020-01-20 PROCEDURE — 99499 RISK ADDL DX/OHS AUDIT: ICD-10-PCS | Mod: S$GLB,,, | Performed by: NURSE PRACTITIONER

## 2020-01-20 PROCEDURE — 99350 HOME/RES VST EST HIGH MDM 60: CPT | Mod: S$GLB,,, | Performed by: NURSE PRACTITIONER

## 2020-01-20 PROCEDURE — 99350 PR HOME VISIT,ESTAB PATIENT,LEVEL IV: ICD-10-PCS | Mod: S$GLB,,, | Performed by: NURSE PRACTITIONER

## 2020-01-20 PROCEDURE — 99499 UNLISTED E&M SERVICE: CPT | Mod: S$GLB,,, | Performed by: NURSE PRACTITIONER

## 2020-01-20 NOTE — PATIENT INSTRUCTIONS
Heart Disease Education    The heart beats 60 to 100 times per minute, 24 hours a day. This equals almost 1000,000 times a day. It pumps blood with oxygen and nutrients to the tissues and organs of the body. But the heart is a muscle and needs its own supply of blood. Blood flow to the heart is supplied by the coronary arteries. Coronary artery disease (atherosclerosis) is a result of cholesterol, saturated fat, and calcium deposits (plaques) that build up inside the walls. This causes inflammation within the coronary arteries. These plaques narrow the artery and reduce blood flow to the heart muscle. The reduction in blood flow to the heart muscle decreases oxygen supply to the heart. If the narrowing is significant enough, the oxygen supply to one or more regions of the heart can be temporarily or permanently shut down. This can cause chest pain, and possibly death of heart tissue (heart attack).  Types of chest pain  Angina is the name for pain in the heart muscle. Angina is a warning sign of serious heart disease. When untreated it can lead to a heart attack, also known as acute myocardial infarction, or AMI. Angina occurs when there is not enough blood and oxygen flowing to the heart for the amount of work it is doing. This most often happens during physical exertion, when the heart is working hardest. It is usually relieved by rest or nitroglycerin. Angina may also occur after a large meal when extra blood is sent to the digestive organs and less goes to the heart. In the case of advanced or unstable heart disease, angina can occur at rest or awaken you from sleep. Angina usually lasts from a few minutes up to 20 minutes or more. When treated early, the effects of angina can be reversed without permanent damage to the heart. Angina is a serious condition and needs to be evaluated by a medical professional immediately.  There are two types of angina -- stable and unstable:  · Stable angina usually occurs  with a predictable level of activity. Being stable, its character, severity, and occurrence do not change much over time. It usually starts with activity, and resolves with rest or taking your medicine as instructed by your doctor. The symptoms usually do not last long.  · Unstable angina changes or gets worse over time. It is different from whatever you are used to. It may feel different or worse, begin without cause, occur with exercise or exertion, wake you up from sleep, and last longer. It may not respond in the same way as it does when you take your usual medicines for an attack. This type of angina can be a warning sign of an impending heart attack.     A heart attack is usually the result of a blood clot that suddenly forms in a coronary artery that has been narrowed with plaque. When this occurs, blood flow may be cut off to a part of the heart muscle, causing the cells to die. This weakens the pumping action of the heart, which affects the delivery of blood to all the other organs in the body including the brain. This damage is not reversible. However, early treatment can limit the amount of damage.  The pain you feel with angina and a heart attack may have a similar quality. However, it is usually different in intensity and duration. Here are some typical descriptions of a heart attack:  · It is most often experienced as a squeezing, crushing, pressure-like sensation in the center of the chest.  · It is sometimes described as something heavy sitting on my chest.  · It may feel more like a bad case of indigestion.  · The pain may spread from the chest to the arm, shoulder, throat or jaw.  · Sometimes the pain is not felt in the chest at all, but only in the arm, shoulder, throat or jaw.  · There may also be nausea, vomiting, dizziness or light-headedness, sweating and trouble breathing.  · Palpitations, or your heart beating rapidly  · A new, irregular heart beat  · Unexplained weakness  You may not be  "able to tell the difference between "bad" angina and a heart attack at home. Seek help if your symptoms are different than usual. Do not be in denial or just try to "tough it out."  Call 911  This is the fastest and safest way to get to the emergency department. The paramedics can also start treatment on the way to the hospital, saving valuable time for your heart.  · If the angina gets worse, if it continues, or if it stops and returns, call 911 immediately. Do not delay. You may be having a heart attack.  · After you call 911, take a second tablet or spray unless instructed otherwise. When repeating doses, sit down if possible, because it can make you feel lightheaded or dizzy. Wait another 5 minutes. If the angina still does not go away, take a third tablet or spray. Do not take more than 3 tablets or sprays within 15 minutes. Stay on the phone with 911 for further instruction.  · Your healthcare provider may give you slightly different instructions than those above. If so, follow them carefully.  Do not wait until symptoms become severe to call 911.  Other reasons to call 911 include:  · Trouble breathing  · Feeling lightheaded, faint, or dizzy  · Rapid heart beat  · Slower than usual heart rate compared to your normal  · Angina with weakness, dizziness, fainting, heavy sweating, nausea, or vomiting  · Extreme drowsiness, confusion  · Weakness of an arm or leg or one side of the face  · Difficulty with speech or vision  When to seek medical care  Remember, the signs and symptoms of a heart attack are not always like they are on TV. Sometimes they are not so obvious. You may only feel weak, or just not right. If it is not clear or if you have any doubt, call for advice.  · Seek help if there is a change in the type of pain, if it feels different, or if your symptoms are mild.  · Do not drive yourself. Have someone else drive you. If no one can drive, call 911.  · Do not delay. Fast diagnosis and treatment can " "prevent or limit the amount of heart damage during a heart attack.  · Do not go to your doctor's office or a clinic as they may not be able to provide all the testing and treatment required for this condition.  · If your doctor has given you medicine to take when symptoms occur, take them but don't delay getting help trying to locate medicines.  What happens in the emergency department  The emergency department is connected to your local emergency medical system (EMS) through 911. That's why during a cardiac emergency, calling 911 is the fastest way to get help. The goal of the emergency department is to rapidly screen, evaluate, and treat people.  Once you are there, an electrocardiogram (ECG or heart tracing) will be done. Blood samples may be taken to look for the presence of heart enzymes that leak from damaged heart cells and show if a heart attack is occurring. You will often be evaluated by a heart specialist (cardiologist) who decides the best course of action. In the case of severe angina or early heart attack, and depending on the circumstances, powerful "clot busting" medicines can be used to dissolve blood clots in the coronary artery. In other cases, you may be taken to a cardiac catheterization lab. Here, a tiny balloon-tipped catheter is advanced through blood vessels to the heart. There the balloon is inflated pushing open the blood vessel restoring blood flow.  Risk factors for heart disease  Risk factors for heart disease are a combination of genetic and lifestyle. Many risk factors work by either directly or indirectly damaging the blood vessels of the heart, or by increasing the risk of forming blood or cholesterol clots, which then clog up and block the arteries.     Examples of physical lifestyle risk factors:  · Cigarette smoking  · High blood pressure  · High blood cholesterol  · Use of stimulant drugs such as cocaine, crack, and amphetamines  · Eating a high-fat, high-cholesterol " meal  · Diabetes   · Obesity which increases risk for diabetes and high blood pressure  · Lack of regular physical activity     Examples of emotional lifestyle factors:  · Chronic high stress levels release stress hormones. These raise blood pressure and cholesterol level and makes blood clot more easily.  · Held-in anger, hostile or cynical attitude  · Social and emotional isolation, lack of intimacy  · Loss of relationship  · Depression  Other factors that increase the risk of heart attack that you cannot control :  · Age. The older you get beyond 40, the greater is your risk of significant coronary artery disease.  · Gender. More men than women get heart disease; but once past menopause, women who are not taking estrogen replacement have the same risk as men for a heart attack.  · Family history. If your mother, father, brother or sister has coronary artery disease, your risk of having it is higher than a person your age without this family history.  What can you do to decrease your risk  To reduce your risk of heart disease:  · Get regular checkups with your doctor.  · Take your medicines for blood pressure, cholesterol or diabetes as directed.  · Watch your diet. Eat a heart healthy diet choosing fresh foods, less salt, cholesterol, and fat  · Stop smoking. Get help if needed.  · Get regular exercise.  · Manage stress.  · Carry a list of medicines and doses in your wallet.  Date Last Reviewed: 12/30/2015  © 2723-9326 [employer]. [address] [place] [address], [place] [address]. All rights reserved. This information is not intended as a substitute for professional medical care. Always follow your healthcare professional's instructions.

## 2020-01-20 NOTE — PROGRESS NOTES
[facility]  Transition of Care Home Visit    Visit Date: 1/20/2020  Encounter Provider: Kathrin Villarreal NP  PCP:  Gianni Escalona MD    PRESENTING HISTORY      Patient ID: Yong Mcintyre is a 44 y.o. male.    Consult Requested By:  Dr. Gianni Escalona  Reason for Consult:  Hospital Follow up    Yong is being seen at home due to physical limitations and transportation issues.      Chief Complaint: Transitional Care      History of Present Illness: Mr. Yong Mcintyre is a 44 y.o. male who was recently admitted to [facility].    Date of Admission:  12/7/2019         Date of Discharge:  12/9/2019     Hospital course:  Patient admitted with diagnosis of ADHF and started on diuresis, but appears generally euvolemic. He describes vertiginous symptoms. Sebree-Hallpike noted to be positive. Orthostasis is negative. Does have some recent URI symptoms. He did say he got some relief with meclizine.  I suspect BPPV may actually be responsible for his symptoms as he appears euvolemic, on home O2, and dizziness does appear to be improving. Suggested to patient f/u with PCP within 1 week to re-evaluate symptoms.  All questions answered to patient satisfaction.  ___________________________________________________________________    Today: Patient seen at home for transitional care visit post hospitalization 12/7/2019 and recent ED encounter. He is AAOx3, ambulatory, wearing home O2, 3L/NC with no complaints, no obvious signs of distress noted, VS stable. Patient denies complaints of dizziness, medications reviewed, patient on long term anticoag therapy, bleeding precautions reinforced, last anti coag visit with Dr. Rodriguez 12/9/2019, INR goal 2.0-3.0, last INR 2.3 (12/9/19). Patient receives his oxygen from [facility], has 2+ edema to BLE, instructed on the importance of elevating extremities. Dietary restrictions reinforced. No other concerns at this time. He lives alone, no family present at visit.  Reports he speaks with family daily and sees family at least 3 times a week.    HPI:  HPI Yong Mcintyre is a 44 year old male with a past medical history of Arthritis, CHF (congestive heart failure), Cor pulmonale, Diastolic dysfunction, Atrial Fibrillation, Gallstones, Hypertension, Left ventricular systolic dysfunction, Morbid obesity, MALLIKA (obstructive sleep apnea), PFO (patent foramen ovale), Pickwickian syndrome, and Pulmonary hypertension. He is on long term anticoagulation therapy with a INR goal 2.0-3.0. He denies a history of smoking, denies smokeless tobacco, denies drug use. He reports occasional alcohol use.     Review of Systems   Constitutional: Negative for chills and fever. Obese  HENT: Negative for congestion and sore throat.    Eyes: Negative for photophobia, pain and discharge.   Respiratory: Negative for cough, hemoptysis, sputum production and shortness of breath.    Cardiovascular: Negative for chest pain, palpitations and leg swelling.   Gastrointestinal: Negative for abdominal pain, diarrhea, nausea and vomiting.   Genitourinary: Negative for dysuria and urgency.   Musculoskeletal: Negative for myalgias and neck pain.   Skin: Negative for itching and rash.   Neurological: Negative for dizziness. Negative for sensory change, focal weakness and headaches.   Endo/Heme/Allergies: Negative for polydipsia. Does not bruise/bleed easily.   Psychiatric/Behavioral: Negative for depression and suicidal ideas.     Assessments:  · Environmental: single story home with 4 stairs to enter, good lighting, not cluttered  · Functional Status: Independent with ADLs and mobility  · Safety: Fall precautions, Bleeding precautions, NO SMOKING oxygen in use  · Nutritional:Obese, dietary restrictions reinforced  · Home Health/DME/Supplies: Oxygen     PAST HISTORY:     Past Medical History:   Diagnosis Date    Arthritis     CHF (congestive heart failure)     Cor pulmonale 11/5/2012    Diastolic dysfunction      Gallstones     Hypertension     Left ventricular systolic dysfunction 11/5/2012    Morbid obesity 11/5/2012    Obesity     MALLIKA (obstructive sleep apnea)     PFO (patent foramen ovale) 11/5/2012    Pickwickian syndrome 11/5/2012    Pulmonary hypertension        History reviewed. No pertinent surgical history.    Family History   Problem Relation Age of Onset    Arthritis Mother     Asthma Mother     Hypertension Father     Asthma Sister     Mental illness Brother     Mental retardation Brother     Arthritis Maternal Grandmother     Asthma Maternal Grandmother     Diabetes Maternal Grandmother     Hypertension Maternal Grandmother     Arthritis Maternal Grandfather     Hypertension Maternal Grandfather     Hypertension Paternal Grandfather     Breast cancer Paternal Grandmother        Social History     Socioeconomic History    Marital status: Single     Spouse name: Not on file    Number of children: Not on file    Years of education: Not on file    Highest education level: Not on file   Occupational History    Not on file   Social Needs    Financial resource strain: Not on file    Food insecurity:     Worry: Not on file     Inability: Not on file    Transportation needs:     Medical: Not on file     Non-medical: Not on file   Tobacco Use    Smoking status: Never Smoker    Smokeless tobacco: Never Used   Substance and Sexual Activity    Alcohol use: Yes     Comment: holidays  rare    Drug use: No    Sexual activity: Not on file   Lifestyle    Physical activity:     Days per week: Not on file     Minutes per session: Not on file    Stress: Not on file   Relationships    Social connections:     Talks on phone: Not on file     Gets together: Not on file     Attends Religion service: Not on file     Active member of club or organization: Not on file     Attends meetings of clubs or organizations: Not on file     Relationship status: Not on file   Other Topics Concern    Not on file    Social History Narrative    Not on file       MEDICATIONS & ALLERGIES:     Current Outpatient Medications on File Prior to Visit   Medication Sig Dispense Refill    ADVAIR DISKUS 250-50 mcg/dose diskus inhaler inhale 1 dose by mouth twice a day 60 each 3    albuterol (VENTOLIN HFA) 90 mcg/actuation inhaler Inhale 2 puffs into the lungs every 4 (four) hours as needed for Wheezing. Rescue 18 g 2    allopurinol (ZYLOPRIM) 300 MG tablet Take 1 tablet (300 mg total) by mouth once daily. 90 tablet 3    bosentan (TRACLEER) 125 MG Tab Take 1 tablet (125 mg total) by mouth every 12 (twelve) hours. 60 tablet 4    cholecalciferol, vitamin D3, (VITAMIN D3) 2,000 unit Cap Take 1 capsule by mouth.      diclofenac sodium (VOLTAREN) 1 % Gel Apply 2 g topically 4 (four) times daily. for 10 days 100 g 0    furosemide (LASIX) 80 MG tablet Take 1 tablet (80 mg total) by mouth 2 (two) times daily. 180 tablet 3    magnesium oxide (MAG-OX) 400 mg tablet Take 1 tablet (400 mg total) by mouth 2 (two) times daily. 60 tablet 11    meclizine (ANTIVERT) 25 mg tablet Take 1 tablet (25 mg total) by mouth 3 (three) times daily as needed for Dizziness. 15 tablet 0    metOLazone (ZAROXOLYN) 2.5 MG tablet Take 2.5 mg by mouth 3 (three) times a week.   0    metoprolol tartrate (LOPRESSOR) 25 MG tablet TAKE 1 TABLET BY MOUTH TWICE DAILY 180 tablet 3    multivitamin (THERAGRAN) per tablet Take by mouth.      potassium chloride (MICRO-K) 10 MEQ CpSR Take 1 capsule (10 mEq total) by mouth once daily. 30 capsule 11    sildenafil (REVATIO) 20 mg Tab Take 20 mg by mouth 3 (three) times daily.       warfarin (COUMADIN) 10 MG tablet Patient takes 5mg (half-a-tablet) by mouth on Mondays and 10mg (1 tablet) by mouth the other days of the week, or as directed by coumadin clinic.       No current facility-administered medications on file prior to visit.         Review of patient's allergies indicates:   Allergen Reactions    Lipitor  "[atorvastatin] Other (See Comments)     "it makes my legs feel like jelly"  Other reaction(s): causes legs to hurt       OBJECTIVE:     Vital Signs:  Vitals:    01/20/20 1535   BP: (!) 146/87   Pulse: 65   Resp: 20   Temp: 97.9 °F (36.6 °C)     Body mass index is 53.08 kg/m².     Physical Exam:  Physical Exam   Constitutional: He is oriented to person, place, and time. No distress.   HENT:   Head: Normocephalic and atraumatic.   Eyes: Pupils are equal, round, and reactive to light. Conjunctivae are normal.   Neck: Normal range of motion. Neck supple. No JVD present. No tracheal deviation present.   Cardiovascular: Normal rate, regular rhythm, normal heart sounds and intact distal pulses.   No murmur heard.  Pulmonary/Chest: Effort normal and breath sounds normal. No respiratory distress. He has no wheezes.   O2, 3L/NC  Abdominal: Soft. Bowel sounds are normal. He exhibits no distension. There is no tenderness.   Musculoskeletal: Normal range of motion. He exhibits edema. 2+ edema BLE   Neurological: He is alert and oriented to person, place, and time. He exhibits normal muscle tone.   Skin: Skin is warm and dry. He is not diaphoretic. No erythema.     Laboratory  Lab Results   Component Value Date    WBC 8.18 12/09/2019    HGB 14.2 12/09/2019    HCT 47.2 12/09/2019    MCV 98 12/09/2019     12/09/2019     Lab Results   Component Value Date    INR 2.3 (H) 12/09/2019    INR 2.6 (H) 12/08/2019    INR 2.6 (H) 12/07/2019     No results found for: HGBA1C  No results for input(s): POCTGLUCOSE in the last 72 hours.    Diagnostic Results:  n/a    TRANSITION OF CARE:     [facility] Contact Note: 12/11/2019    Family and/or Caretaker present at visit?  No.  Diagnostic tests reviewed/disposition: No diagnosic tests pending after this hospitalization.  Disease/illness education: yes  Home health/community services discussion/referrals: Patient does not have home health established from hospital visit.  They do not " need home health.  If needed, we will set up home health for the patient.   Establishment or re-establishment of referral orders for community resources: No other necessary community resources.   Discussion with other health care providers: No discussion with other health care providers necessary.     Transition of Care Visit:     I have reviewed and updated the history and problem list.  I have reconciled the medication list.  I have discussed the hospitalization and current medical issues, prognosis and plans with the patient/family.  I  spent more than 50% of time discussing the care with the patient/family.  Total Face-to-Face Encounter: 60 minutes.    Medications Reconciliation:   I have reconciled the patient's home medications and discharge medications with the patient/family. I have updated all changes.  Refer to After-Visit Medication List.    ASSESSMENT & PLAN:     HIGH RISK CONDITION(S):  Patient has a condition that poses threat to life and bodily function: Congestive Heart Failure    Yong was seen today for transitional care.    Diagnoses and all orders for this visit:    Chronic pulmonary heart disease, unspecified  PFO (patent foramen ovale)  Pulmonary Hypertension  Acute and chronic respiratory failure with hypercapnia  Shortness of breath  Oxygen dependent  -Orthostatic negative  -home O2, 3L/NC  -2D Echo (3/2019)  · Mildly decreased left ventricular systolic function. The estimated ejection fraction is 48% ( probably upper 40s to at most low 50s but lower on the auto EF)  · Mild global hypokinetic wall motion.  · Septal wall has abnormal motion.  · Grade I (mild) left ventricular diastolic dysfunction consistent with impaired relaxation.  · Normal left atrial pressure.  · Mild right ventricular enlargement.  · Normal right ventricular systolic function.  · Mild pulmonic regurgitation.  · Normal central venous pressure (3 mm Hg).  · The estimated PA systolic pressure is 20 mm Hg        Paroxysmal  atrial fibrillation  Long term current use of anticoagulant therapy  -Continue Coumadin and Lopressor  -INR goal 2.0-3.0  -Bleeding precautions    CKD (chronic kidney disease) stage 3, GFR 30-59 ml/min  -Monitor I&Os  -Cr wnl at hospitalization        Were controlled substances prescribed?  No  Instructions for the patient:    Scheduled Follow-up :  No future appointments.    After Visit Medication List :     Medication List           Accurate as of January 20, 2020  3:54 PM. If you have any questions, ask your nurse or doctor.               CONTINUE taking these medications    Advair Diskus 250-50 mcg/dose diskus inhaler  Generic drug:  fluticasone-salmeterol 250-50 mcg/dose  inhale 1 dose by mouth twice a day     allopurinol 300 MG tablet  Commonly known as:  ZYLOPRIM  Take 1 tablet (300 mg total) by mouth once daily.     bosentan 125 MG Tab  Commonly known as:  Tracleer  Take 1 tablet (125 mg total) by mouth every 12 (twelve) hours.     cholecalciferol (vitamin D3) 50 mcg (2,000 unit) Cap  Commonly known as:  VITAMIN D3     diclofenac sodium 1 % Gel  Commonly known as:  Voltaren  Apply 2 g topically 4 (four) times daily. for 10 days     furosemide 80 MG tablet  Commonly known as:  LASIX  Take 1 tablet (80 mg total) by mouth 2 (two) times daily.     magnesium oxide 400 mg (241.3 mg magnesium) tablet  Commonly known as:  MAG-OX  Take 1 tablet (400 mg total) by mouth 2 (two) times daily.     meclizine 25 mg tablet  Commonly known as:  ANTIVERT  Take 1 tablet (25 mg total) by mouth 3 (three) times daily as needed for Dizziness.     metOLazone 2.5 MG tablet  Commonly known as:  ZAROXOLYN     metoprolol tartrate 25 MG tablet  Commonly known as:  LOPRESSOR  TAKE 1 TABLET BY MOUTH TWICE DAILY     multivitamin per tablet  Commonly known as:  THERAGRAN     potassium chloride 10 MEQ Cpsr  Commonly known as:  MICRO-K  Take 1 capsule (10 mEq total) by mouth once daily.     Revatio 20 mg Tab  Generic drug:  sildenafil      Ventolin HFA 90 mcg/actuation inhaler  Generic drug:  albuterol  Inhale 2 puffs into the lungs every 4 (four) hours as needed for Wheezing. Rescue     warfarin 10 MG tablet  Commonly known as:  COUMADIN  Patient takes 5mg (half-a-tablet) by mouth on Mondays and 10mg (1 tablet) by mouth the other days of the week, or as directed by coumadin clinic.            Signature:  Kathrin Villarreal NP    Patient consent obtained prior to treatment

## 2020-02-03 DIAGNOSIS — I27.9 CHRONIC PULMONARY HEART DISEASE: ICD-10-CM

## 2020-02-03 RX ORDER — ALBUTEROL SULFATE 90 UG/1
AEROSOL, METERED RESPIRATORY (INHALATION)
Qty: 18 G | Refills: 2 | OUTPATIENT
Start: 2020-02-03

## 2020-02-04 ENCOUNTER — LAB VISIT (OUTPATIENT)
Dept: LAB | Facility: HOSPITAL | Age: 45
End: 2020-02-04
Payer: MEDICARE

## 2020-02-04 DIAGNOSIS — Z79.01 LONG TERM CURRENT USE OF ANTICOAGULANT THERAPY: ICD-10-CM

## 2020-02-04 LAB
INR PPP: 2.8 (ref 0.8–1.2)
PROTHROMBIN TIME: 26.7 SEC (ref 9–12.5)

## 2020-02-04 PROCEDURE — 36415 COLL VENOUS BLD VENIPUNCTURE: CPT | Mod: HCNC

## 2020-02-04 PROCEDURE — 85610 PROTHROMBIN TIME: CPT | Mod: HCNC

## 2020-02-05 ENCOUNTER — ANTI-COAG VISIT (OUTPATIENT)
Dept: CARDIOLOGY | Facility: CLINIC | Age: 45
End: 2020-02-05
Payer: MEDICARE

## 2020-02-05 DIAGNOSIS — I27.9 CHRONIC PULMONARY HEART DISEASE: ICD-10-CM

## 2020-02-05 DIAGNOSIS — Z79.01 LONG TERM CURRENT USE OF ANTICOAGULANT THERAPY: ICD-10-CM

## 2020-02-05 PROCEDURE — 93793 ANTICOAG MGMT PT WARFARIN: CPT | Mod: S$GLB,,, | Performed by: PHARMACIST

## 2020-02-05 PROCEDURE — 93793 PR ANTICOAGULANT MGMT FOR PT TAKING WARFARIN: ICD-10-PCS | Mod: S$GLB,,, | Performed by: PHARMACIST

## 2020-02-14 DIAGNOSIS — I27.0 PRIMARY PULMONARY HYPERTENSION: ICD-10-CM

## 2020-02-14 DIAGNOSIS — I27.20 PULMONARY HYPERTENSION: ICD-10-CM

## 2020-02-14 DIAGNOSIS — E66.2 PICKWICKIAN SYNDROME: ICD-10-CM

## 2020-02-14 DIAGNOSIS — Q21.12 PFO (PATENT FORAMEN OVALE): ICD-10-CM

## 2020-02-14 DIAGNOSIS — I48.91 ATRIAL FIBRILLATION, UNSPECIFIED TYPE: ICD-10-CM

## 2020-02-14 DIAGNOSIS — I48.0 PAROXYSMAL ATRIAL FIBRILLATION: ICD-10-CM

## 2020-02-14 DIAGNOSIS — I27.81 COR PULMONALE: ICD-10-CM

## 2020-02-14 DIAGNOSIS — E05.90 HYPERTHYROIDISM: ICD-10-CM

## 2020-02-14 DIAGNOSIS — I27.9 CHRONIC PULMONARY HEART DISEASE: ICD-10-CM

## 2020-02-14 RX ORDER — ALLOPURINOL 300 MG/1
TABLET ORAL
Qty: 90 TABLET | Refills: 3 | OUTPATIENT
Start: 2020-02-14

## 2020-02-17 ENCOUNTER — ANTI-COAG VISIT (OUTPATIENT)
Dept: CARDIOLOGY | Facility: CLINIC | Age: 45
End: 2020-02-17
Payer: MEDICARE

## 2020-02-17 ENCOUNTER — LAB VISIT (OUTPATIENT)
Dept: LAB | Facility: HOSPITAL | Age: 45
End: 2020-02-17
Attending: INTERNAL MEDICINE
Payer: MEDICARE

## 2020-02-17 DIAGNOSIS — Z79.01 LONG TERM CURRENT USE OF ANTICOAGULANT THERAPY: ICD-10-CM

## 2020-02-17 DIAGNOSIS — I27.9 CHRONIC PULMONARY HEART DISEASE: ICD-10-CM

## 2020-02-17 LAB
INR PPP: 2.6 (ref 0.8–1.2)
PROTHROMBIN TIME: 25 SEC (ref 9–12.5)

## 2020-02-17 PROCEDURE — 85610 PROTHROMBIN TIME: CPT | Mod: HCNC

## 2020-02-17 PROCEDURE — 36415 COLL VENOUS BLD VENIPUNCTURE: CPT | Mod: HCNC

## 2020-02-17 PROCEDURE — 93793 PR ANTICOAGULANT MGMT FOR PT TAKING WARFARIN: ICD-10-PCS | Mod: S$GLB,,, | Performed by: PHARMACIST

## 2020-02-17 PROCEDURE — 93793 ANTICOAG MGMT PT WARFARIN: CPT | Mod: S$GLB,,, | Performed by: PHARMACIST

## 2020-02-27 ENCOUNTER — CARE AT HOME (OUTPATIENT)
Dept: HOME HEALTH SERVICES | Facility: CLINIC | Age: 45
End: 2020-02-27
Payer: MEDICARE

## 2020-02-27 VITALS
WEIGHT: 315 LBS | DIASTOLIC BLOOD PRESSURE: 82 MMHG | HEART RATE: 86 BPM | BODY MASS INDEX: 52.48 KG/M2 | OXYGEN SATURATION: 96 % | SYSTOLIC BLOOD PRESSURE: 137 MMHG | TEMPERATURE: 99 F | HEIGHT: 65 IN | RESPIRATION RATE: 20 BRPM

## 2020-02-27 DIAGNOSIS — I27.20 PULMONARY HYPERTENSION: ICD-10-CM

## 2020-02-27 DIAGNOSIS — I48.0 PAROXYSMAL ATRIAL FIBRILLATION: ICD-10-CM

## 2020-02-27 DIAGNOSIS — Z99.81 OXYGEN DEPENDENT: Primary | ICD-10-CM

## 2020-02-27 DIAGNOSIS — E66.01 MORBID OBESITY: ICD-10-CM

## 2020-02-27 DIAGNOSIS — I48.91 ATRIAL FIBRILLATION, UNSPECIFIED TYPE: ICD-10-CM

## 2020-02-27 DIAGNOSIS — I27.81 COR PULMONALE: ICD-10-CM

## 2020-02-27 DIAGNOSIS — Q21.12 PFO (PATENT FORAMEN OVALE): ICD-10-CM

## 2020-02-27 DIAGNOSIS — I27.9 CHRONIC PULMONARY HEART DISEASE: ICD-10-CM

## 2020-02-27 DIAGNOSIS — E66.2 PICKWICKIAN SYNDROME: ICD-10-CM

## 2020-02-27 DIAGNOSIS — I27.0 PRIMARY PULMONARY HYPERTENSION: ICD-10-CM

## 2020-02-27 DIAGNOSIS — R06.02 SHORTNESS OF BREATH: ICD-10-CM

## 2020-02-27 DIAGNOSIS — E05.90 HYPERTHYROIDISM: ICD-10-CM

## 2020-02-27 DIAGNOSIS — Z79.01 LONG TERM CURRENT USE OF ANTICOAGULANT THERAPY: ICD-10-CM

## 2020-02-27 PROCEDURE — 3075F PR MOST RECENT SYSTOLIC BLOOD PRESS GE 130-139MM HG: ICD-10-PCS | Mod: CPTII,S$GLB,, | Performed by: NURSE PRACTITIONER

## 2020-02-27 PROCEDURE — 3075F SYST BP GE 130 - 139MM HG: CPT | Mod: CPTII,S$GLB,, | Performed by: NURSE PRACTITIONER

## 2020-02-27 PROCEDURE — 3079F DIAST BP 80-89 MM HG: CPT | Mod: CPTII,S$GLB,, | Performed by: NURSE PRACTITIONER

## 2020-02-27 PROCEDURE — 99349 HOME/RES VST EST MOD MDM 40: CPT | Mod: S$GLB,,, | Performed by: NURSE PRACTITIONER

## 2020-02-27 PROCEDURE — 3079F PR MOST RECENT DIASTOLIC BLOOD PRESSURE 80-89 MM HG: ICD-10-PCS | Mod: CPTII,S$GLB,, | Performed by: NURSE PRACTITIONER

## 2020-02-27 PROCEDURE — 99349 PR HOME VISIT,ESTAB PATIENT,LEVEL III: ICD-10-PCS | Mod: S$GLB,,, | Performed by: NURSE PRACTITIONER

## 2020-02-27 PROCEDURE — 3008F PR BODY MASS INDEX (BMI) DOCUMENTED: ICD-10-PCS | Mod: CPTII,S$GLB,, | Performed by: NURSE PRACTITIONER

## 2020-02-27 PROCEDURE — 3008F BODY MASS INDEX DOCD: CPT | Mod: CPTII,S$GLB,, | Performed by: NURSE PRACTITIONER

## 2020-02-27 RX ORDER — ALLOPURINOL 300 MG/1
300 TABLET ORAL DAILY
Qty: 90 TABLET | Refills: 3
Start: 2020-02-27 | End: 2020-03-06 | Stop reason: SDUPTHER

## 2020-02-27 RX ORDER — FUROSEMIDE 80 MG/1
80 TABLET ORAL 2 TIMES DAILY
Qty: 180 TABLET | Refills: 3 | Status: SHIPPED | OUTPATIENT
Start: 2020-02-27 | End: 2021-03-07 | Stop reason: SDUPTHER

## 2020-02-28 NOTE — PROGRESS NOTES
[facility]  Transition of Care Home Visit    Visit Date: 2/27/2020  Encounter Provider: Kathrin Villarreal, NP  PCP:  Gianni Escalona MD    PRESENTING HISTORY      Patient ID: Yong Mcintyre is a 44 y.o. male.    Consult Requested By:  Kathrin Villarreal  Reason for Consult:  Hospital Follow up    Yong is being seen at home due to physical limitations and transportation issues.      Chief Complaint: Follow-up      History of Present Illness: Mr. Yong Mcintyre is a 44 y.o. male who was recently admitted to [facility].    Date of Admission:  12/7/2019         Date of Discharge:  12/9/2019     Hospital course:  Patient admitted with diagnosis of ADHF and started on diuresis, but appears generally euvolemic. He describes vertiginous symptoms. Missy-Hallpike noted to be positive. Orthostasis is negative. Does have some recent URI symptoms. He did say he got some relief with meclizine.  I suspect BPPV may actually be responsible for his symptoms as he appears euvolemic, on home O2, and dizziness does appear to be improving. Suggested to patient f/u with PCP within 1 week to re-evaluate symptoms.  All questions answered to patient satisfaction.  ___________________________________________________________________    Today: Patient seen at home for followup visit post hospitalization 12/7/2019 and recent ED encounter. He is AAOx3, ambulatory, wearing home O2, 3L/NC with no complaints, no obvious signs of distress noted, VS stable. Patient denies complaints of dizziness, medications reviewed, patient on long term anticoag therapy, bleeding precautions reinforced, last anti coag visit with Dr. Rodriguez 12/9/2019, INR goal 2.0-3.0, last INR 2.3 (12/9/19). Patient receives his oxygen from [facility], has 2+ edema to BLE, instructed on the importance of elevating extremities, verbalized understanding. Dietary restrictions reinforced. No other concerns at this time. He lives alone, no family present at visit.  Reports he speaks with family daily and sees family at least 3 times a week.    HPI:   Yong Mcintyre is a 44 year old male with a past medical history of Arthritis, CHF (congestive heart failure), Cor pulmonale, Diastolic dysfunction, Atrial Fibrillation, Gallstones, Hypertension, Left ventricular systolic dysfunction, Morbid obesity, MALLIKA (obstructive sleep apnea), PFO (patent foramen ovale), Pickwickian syndrome, and Pulmonary hypertension. He is on long term anticoagulation therapy with a INR goal 2.0-3.0. He denies a history of smoking, denies smokeless tobacco, denies drug use. He reports occasional alcohol use.     Review of Systems   Constitutional: Negative for chills and fever. Obese  HENT: Negative for congestion and sore throat.    Eyes: Negative for photophobia, pain and discharge.   Respiratory: Negative for cough, hemoptysis, sputum production and shortness of breath.    Cardiovascular: Negative for chest pain, palpitations and leg swelling.   Gastrointestinal: Negative for abdominal pain, diarrhea, nausea and vomiting.   Genitourinary: Negative for dysuria and urgency.   Musculoskeletal: Negative for myalgias and neck pain.   Skin: Negative for itching and rash.   Neurological: Negative for dizziness. Negative for sensory change, focal weakness and headaches.   Endo/Heme/Allergies: Negative for polydipsia. Does not bruise/bleed easily.   Psychiatric/Behavioral: Negative for depression and suicidal ideas.     Assessments:  · Environmental: single story home with 4 stairs to enter, good lighting, not cluttered  · Functional Status: Independent with ADLs and mobility  · Safety: Fall precautions, Bleeding precautions, NO SMOKING oxygen in use  · Nutritional:Obese, dietary restrictions reinforced  · Home Health/DME/Supplies: Oxygen     PAST HISTORY:     Past Medical History:   Diagnosis Date    Arthritis     CHF (congestive heart failure)     Cor pulmonale 11/5/2012    Diastolic dysfunction      Gallstones     Hypertension     Left ventricular systolic dysfunction 11/5/2012    Morbid obesity 11/5/2012    Obesity     MALLIKA (obstructive sleep apnea)     PFO (patent foramen ovale) 11/5/2012    Pickwickian syndrome 11/5/2012    Pulmonary hypertension        History reviewed. No pertinent surgical history.    Family History   Problem Relation Age of Onset    Arthritis Mother     Asthma Mother     Hypertension Father     Asthma Sister     Mental illness Brother     Mental retardation Brother     Arthritis Maternal Grandmother     Asthma Maternal Grandmother     Diabetes Maternal Grandmother     Hypertension Maternal Grandmother     Arthritis Maternal Grandfather     Hypertension Maternal Grandfather     Hypertension Paternal Grandfather     Breast cancer Paternal Grandmother        Social History     Socioeconomic History    Marital status: Single     Spouse name: Not on file    Number of children: Not on file    Years of education: Not on file    Highest education level: Not on file   Occupational History    Not on file   Social Needs    Financial resource strain: Not on file    Food insecurity:     Worry: Not on file     Inability: Not on file    Transportation needs:     Medical: Not on file     Non-medical: Not on file   Tobacco Use    Smoking status: Never Smoker    Smokeless tobacco: Never Used   Substance and Sexual Activity    Alcohol use: Yes     Comment: holidays  rare    Drug use: No    Sexual activity: Not on file   Lifestyle    Physical activity:     Days per week: Not on file     Minutes per session: Not on file    Stress: Not on file   Relationships    Social connections:     Talks on phone: Not on file     Gets together: Not on file     Attends Congregation service: Not on file     Active member of club or organization: Not on file     Attends meetings of clubs or organizations: Not on file     Relationship status: Not on file   Other Topics Concern    Not on file  "  Social History Narrative    Not on file       MEDICATIONS & ALLERGIES:     Current Outpatient Medications on File Prior to Visit   Medication Sig Dispense Refill    ADVAIR DISKUS 250-50 mcg/dose diskus inhaler inhale 1 dose by mouth twice a day 60 each 3    albuterol (VENTOLIN HFA) 90 mcg/actuation inhaler Inhale 2 puffs into the lungs every 4 (four) hours as needed for Wheezing. Rescue 18 g 2    bosentan (TRACLEER) 125 MG Tab Take 1 tablet (125 mg total) by mouth every 12 (twelve) hours. 60 tablet 4    cholecalciferol, vitamin D3, (VITAMIN D3) 2,000 unit Cap Take 1 capsule by mouth.      diclofenac sodium (VOLTAREN) 1 % Gel Apply 2 g topically 4 (four) times daily. for 10 days 100 g 0    magnesium oxide (MAG-OX) 400 mg tablet Take 1 tablet (400 mg total) by mouth 2 (two) times daily. 60 tablet 11    meclizine (ANTIVERT) 25 mg tablet Take 1 tablet (25 mg total) by mouth 3 (three) times daily as needed for Dizziness. 15 tablet 0    metOLazone (ZAROXOLYN) 2.5 MG tablet Take 2.5 mg by mouth 3 (three) times a week.   0    metoprolol tartrate (LOPRESSOR) 25 MG tablet TAKE 1 TABLET BY MOUTH TWICE DAILY 180 tablet 3    multivitamin (THERAGRAN) per tablet Take by mouth.      potassium chloride (MICRO-K) 10 MEQ CpSR Take 1 capsule (10 mEq total) by mouth once daily. 30 capsule 11    sildenafil (REVATIO) 20 mg Tab Take 20 mg by mouth 3 (three) times daily.       warfarin (COUMADIN) 10 MG tablet Patient takes 5mg (half-a-tablet) by mouth on Mondays and 10mg (1 tablet) by mouth the other days of the week, or as directed by coumadin clinic.       No current facility-administered medications on file prior to visit.         Review of patient's allergies indicates:   Allergen Reactions    Lipitor [atorvastatin] Other (See Comments)     "it makes my legs feel like jelly"  Other reaction(s): causes legs to hurt       OBJECTIVE:     Vital Signs:  Vitals:    02/27/20 1046   BP: 137/82   Pulse: 86   Resp: 20   Temp: " 98.9 °F (37.2 °C)     Body mass index is 53.08 kg/m².     Physical Exam:  Physical Exam   Constitutional: He is oriented to person, place, and time. No distress.   HENT:   Head: Normocephalic and atraumatic.   Eyes: Pupils are equal, round, and reactive to light. Conjunctivae are normal.   Neck: Normal range of motion. Neck supple. No JVD present. No tracheal deviation present.   Cardiovascular: Normal rate, regular rhythm, normal heart sounds and intact distal pulses.   No murmur heard.  Pulmonary/Chest: Effort normal and breath sounds normal. No respiratory distress. He has no wheezes.   O2, 3L/NC  Abdominal: Soft. Bowel sounds are normal. He exhibits no distension. There is no tenderness.   Musculoskeletal: Normal range of motion. He exhibits edema. 2+ edema BLE   Neurological: He is alert and oriented to person, place, and time. He exhibits normal muscle tone.   Skin: Skin is warm and dry. He is not diaphoretic. No erythema.     Laboratory  Lab Results   Component Value Date    WBC 8.18 12/09/2019    HGB 14.2 12/09/2019    HCT 47.2 12/09/2019    MCV 98 12/09/2019     12/09/2019     Lab Results   Component Value Date    INR 2.6 (H) 02/17/2020    INR 2.8 (H) 02/04/2020    INR 2.3 (H) 12/09/2019     No results found for: HGBA1C  No results for input(s): POCTGLUCOSE in the last 72 hours.    Diagnostic Results:  n/a    TRANSITION OF CARE:     [facility] Contact Note: 12/11/2019    Family and/or Caretaker present at visit?  No.  Diagnostic tests reviewed/disposition: No diagnosic tests pending after this hospitalization.  Disease/illness education: yes  Home health/community services discussion/referrals: Patient does not have home health established from hospital visit.  They do not need home health.  If needed, we will set up home health for the patient.   Establishment or re-establishment of referral orders for community resources: No other necessary community resources.   Discussion with other health  care providers: No discussion with other health care providers necessary.     Transition of Care Visit:     I have reviewed and updated the history and problem list.  I have reconciled the medication list.  I have discussed the hospitalization and current medical issues, prognosis and plans with the patient/family.  I  spent more than 50% of time discussing the care with the patient/family.  Total Face-to-Face Encounter: 60 minutes.    Medications Reconciliation:   I have reconciled the patient's home medications and discharge medications with the patient/family. I have updated all changes.  Refer to After-Visit Medication List.    ASSESSMENT & PLAN:     HIGH RISK CONDITION(S):  Patient has a condition that poses threat to life and bodily function: Congestive Heart Failure    Yong was seen today for transitional care.    Diagnoses and all orders for this visit:    Chronic pulmonary heart disease, unspecified  PFO (patent foramen ovale)  Pulmonary Hypertension  Acute and chronic respiratory failure with hypercapnia  Shortness of breath  Oxygen dependent  -Orthostatic negative  -home O2, 3L/NC  -2D Echo (3/2019)  · Mildly decreased left ventricular systolic function. The estimated ejection fraction is 48% ( probably upper 40s to at most low 50s but lower on the auto EF)  · Mild global hypokinetic wall motion.  · Septal wall has abnormal motion.  · Grade I (mild) left ventricular diastolic dysfunction consistent with impaired relaxation.  · Normal left atrial pressure.  · Mild right ventricular enlargement.  · Normal right ventricular systolic function.  · Mild pulmonic regurgitation.  · Normal central venous pressure (3 mm Hg).  · The estimated PA systolic pressure is 20 mm Hg        Paroxysmal atrial fibrillation  Long term current use of anticoagulant therapy  -Continue Coumadin and Lopressor  -INR goal 2.0-3.0  -Bleeding precautions    CKD (chronic kidney disease) stage 3, GFR 30-59 ml/min  -Monitor I&Os  -Cr wnl  at hospitalization        Were controlled substances prescribed?  No  Instructions for the patient:    Scheduled Follow-up :  Future Appointments   Date Time Provider Department Center   3/2/2020  7:55 AM LAB, APPOINTMENT [place] [facility]       After Visit Medication List :     Medication List           Accurate as of February 27, 2020 11:59 PM. If you have any questions, ask your nurse or doctor.               CONTINUE taking these medications    Advair Diskus 250-50 mcg/dose diskus inhaler  Generic drug:  fluticasone-salmeterol 250-50 mcg/dose  inhale 1 dose by mouth twice a day     allopurinoL 300 MG tablet  Commonly known as:  ZYLOPRIM  Take 1 tablet (300 mg total) by mouth once daily.     bosentan 125 MG Tab  Commonly known as:  Tracleer  Take 1 tablet (125 mg total) by mouth every 12 (twelve) hours.     cholecalciferol (vitamin D3) 50 mcg (2,000 unit) Cap  Commonly known as:  VITAMIN D3     diclofenac sodium 1 % Gel  Commonly known as:  Voltaren  Apply 2 g topically 4 (four) times daily. for 10 days     furosemide 80 MG tablet  Commonly known as:  LASIX  Take 1 tablet (80 mg total) by mouth 2 (two) times daily.     magnesium oxide 400 mg (241.3 mg magnesium) tablet  Commonly known as:  MAG-OX  Take 1 tablet (400 mg total) by mouth 2 (two) times daily.     meclizine 25 mg tablet  Commonly known as:  ANTIVERT  Take 1 tablet (25 mg total) by mouth 3 (three) times daily as needed for Dizziness.     metOLazone 2.5 MG tablet  Commonly known as:  ZAROXOLYN     metoprolol tartrate 25 MG tablet  Commonly known as:  LOPRESSOR  TAKE 1 TABLET BY MOUTH TWICE DAILY     multivitamin per tablet  Commonly known as:  THERAGRAN     potassium chloride 10 MEQ Cpsr  Commonly known as:  MICRO-K  Take 1 capsule (10 mEq total) by mouth once daily.     Revatio 20 mg Tab  Generic drug:  sildenafil     Ventolin HFA 90 mcg/actuation inhaler  Generic drug:  albuterol  Inhale 2 puffs into the lungs every 4 (four) hours  as needed for Wheezing. Rescue     warfarin 10 MG tablet  Commonly known as:  COUMADIN  Patient takes 5mg (half-a-tablet) by mouth on Mondays and 10mg (1 tablet) by mouth the other days of the week, or as directed by coumadin clinic.           Where to Get Your Medications      These medications were sent to [id number] [address] AT [facility] [address] [address]    Phone:  [phone]   · furosemide 80 MG tablet     Information about where to get these medications is not yet available    Ask your nurse or doctor about these medications  · allopurinoL 300 MG tablet         Signature:  Kathrin Villarreal NP    Patient consent obtained prior to treatment

## 2020-03-02 ENCOUNTER — LAB VISIT (OUTPATIENT)
Dept: LAB | Facility: HOSPITAL | Age: 45
End: 2020-03-02
Attending: INTERNAL MEDICINE
Payer: MEDICARE

## 2020-03-02 ENCOUNTER — ANTI-COAG VISIT (OUTPATIENT)
Dept: CARDIOLOGY | Facility: CLINIC | Age: 45
End: 2020-03-02
Payer: MEDICARE

## 2020-03-02 DIAGNOSIS — Z79.01 LONG TERM CURRENT USE OF ANTICOAGULANT THERAPY: ICD-10-CM

## 2020-03-02 DIAGNOSIS — I27.9 CHRONIC PULMONARY HEART DISEASE: ICD-10-CM

## 2020-03-02 LAB
INR PPP: 3.4 (ref 0.8–1.2)
PROTHROMBIN TIME: 32.2 SEC (ref 9–12.5)

## 2020-03-02 PROCEDURE — 93793 PR ANTICOAGULANT MGMT FOR PT TAKING WARFARIN: ICD-10-PCS | Mod: S$GLB,,, | Performed by: PHARMACIST

## 2020-03-02 PROCEDURE — 93793 ANTICOAG MGMT PT WARFARIN: CPT | Mod: S$GLB,,, | Performed by: PHARMACIST

## 2020-03-02 PROCEDURE — 85610 PROTHROMBIN TIME: CPT | Mod: HCNC

## 2020-03-02 PROCEDURE — 36415 COLL VENOUS BLD VENIPUNCTURE: CPT | Mod: HCNC

## 2020-03-02 NOTE — PATIENT INSTRUCTIONS
Instructions:  1. Take all medications as prescribed  2. Keep all follow-up appointments  3. Return to the hospital or call your primary care physicians if any worsening symptoms such as fever, chest pain, shortness of breath, return of symptoms, or any other concerns.

## 2020-03-02 NOTE — PROGRESS NOTES
Confirmed taking correct dose of coumadin  Eating more veggies than usual (broccli & mixed veggies ); L arm bruising   NO other changes

## 2020-03-03 DIAGNOSIS — I27.9 CHRONIC PULMONARY HEART DISEASE: ICD-10-CM

## 2020-03-03 DIAGNOSIS — E05.90 HYPERTHYROIDISM: ICD-10-CM

## 2020-03-03 DIAGNOSIS — I27.81 COR PULMONALE: ICD-10-CM

## 2020-03-03 DIAGNOSIS — Q21.12 PFO (PATENT FORAMEN OVALE): ICD-10-CM

## 2020-03-03 DIAGNOSIS — I27.20 PULMONARY HYPERTENSION: ICD-10-CM

## 2020-03-03 DIAGNOSIS — I48.91 ATRIAL FIBRILLATION, UNSPECIFIED TYPE: ICD-10-CM

## 2020-03-03 DIAGNOSIS — E66.2 PICKWICKIAN SYNDROME: ICD-10-CM

## 2020-03-03 DIAGNOSIS — I48.0 PAROXYSMAL ATRIAL FIBRILLATION: ICD-10-CM

## 2020-03-03 DIAGNOSIS — I27.0 PRIMARY PULMONARY HYPERTENSION: ICD-10-CM

## 2020-03-05 RX ORDER — ALLOPURINOL 300 MG/1
TABLET ORAL
Qty: 90 TABLET | Refills: 3 | OUTPATIENT
Start: 2020-03-05

## 2020-03-06 DIAGNOSIS — I48.0 PAROXYSMAL ATRIAL FIBRILLATION: ICD-10-CM

## 2020-03-06 DIAGNOSIS — I27.0 PRIMARY PULMONARY HYPERTENSION: ICD-10-CM

## 2020-03-06 DIAGNOSIS — I27.20 PULMONARY HYPERTENSION: ICD-10-CM

## 2020-03-06 DIAGNOSIS — E05.90 HYPERTHYROIDISM: ICD-10-CM

## 2020-03-06 DIAGNOSIS — I48.91 ATRIAL FIBRILLATION, UNSPECIFIED TYPE: ICD-10-CM

## 2020-03-06 DIAGNOSIS — I27.81 COR PULMONALE: ICD-10-CM

## 2020-03-06 DIAGNOSIS — E66.2 PICKWICKIAN SYNDROME: ICD-10-CM

## 2020-03-06 DIAGNOSIS — I27.9 CHRONIC PULMONARY HEART DISEASE: ICD-10-CM

## 2020-03-06 DIAGNOSIS — Q21.12 PFO (PATENT FORAMEN OVALE): ICD-10-CM

## 2020-03-06 NOTE — TELEPHONE ENCOUNTER
----- Message from Rohith Shipman sent at 3/6/2020  1:26 PM CST -----  Contact: Patient [phone]  RX request - refill or new RX.  Is this a refill or new RX:  Refill  RX name and strength:  allopurinoL (ZYLOPRIM) 300 MG tablet   Directions:   Is this a 30 day or 90 day RX:    Pharmacy name and phone #[facility] #[id number] - [place] [address] [address] [facility] AT [facility] [address] [phone] (Phone) [phone] (Fax    Comments:  Patient stating pharmacy informed no refills left, call to inform has been sent.    Please call an advise  Thank you

## 2020-03-07 RX ORDER — ALLOPURINOL 300 MG/1
300 TABLET ORAL DAILY
Qty: 90 TABLET | Refills: 3
Start: 2020-03-07 | End: 2020-03-10

## 2020-03-10 DIAGNOSIS — I48.0 PAROXYSMAL ATRIAL FIBRILLATION: ICD-10-CM

## 2020-03-10 DIAGNOSIS — I27.81 COR PULMONALE: ICD-10-CM

## 2020-03-10 DIAGNOSIS — Q21.12 PFO (PATENT FORAMEN OVALE): ICD-10-CM

## 2020-03-10 DIAGNOSIS — E05.90 HYPERTHYROIDISM: ICD-10-CM

## 2020-03-10 DIAGNOSIS — I27.20 PULMONARY HYPERTENSION: ICD-10-CM

## 2020-03-10 DIAGNOSIS — I27.9 CHRONIC PULMONARY HEART DISEASE: ICD-10-CM

## 2020-03-10 DIAGNOSIS — I48.91 ATRIAL FIBRILLATION, UNSPECIFIED TYPE: ICD-10-CM

## 2020-03-10 DIAGNOSIS — E66.2 PICKWICKIAN SYNDROME: ICD-10-CM

## 2020-03-10 DIAGNOSIS — I27.0 PRIMARY PULMONARY HYPERTENSION: ICD-10-CM

## 2020-03-10 RX ORDER — ALLOPURINOL 300 MG/1
TABLET ORAL
Qty: 90 TABLET | Refills: 3 | OUTPATIENT
Start: 2020-03-10

## 2020-03-10 RX ORDER — ALLOPURINOL 300 MG/1
TABLET ORAL
Qty: 90 TABLET | Refills: 3 | Status: SHIPPED | OUTPATIENT
Start: 2020-03-10 | End: 2021-03-07 | Stop reason: SDUPTHER

## 2020-03-16 ENCOUNTER — ANTI-COAG VISIT (OUTPATIENT)
Dept: CARDIOLOGY | Facility: CLINIC | Age: 45
End: 2020-03-16
Payer: MEDICARE

## 2020-03-16 ENCOUNTER — LAB VISIT (OUTPATIENT)
Dept: LAB | Facility: HOSPITAL | Age: 45
End: 2020-03-16
Attending: INTERNAL MEDICINE
Payer: MEDICARE

## 2020-03-16 DIAGNOSIS — Z79.01 LONG TERM CURRENT USE OF ANTICOAGULANT THERAPY: ICD-10-CM

## 2020-03-16 DIAGNOSIS — I27.9 CHRONIC PULMONARY HEART DISEASE: ICD-10-CM

## 2020-03-16 LAB
INR PPP: 2.9 (ref 0.8–1.2)
PROTHROMBIN TIME: 27.9 SEC (ref 9–12.5)

## 2020-03-16 PROCEDURE — 85610 PROTHROMBIN TIME: CPT | Mod: HCNC

## 2020-03-16 PROCEDURE — 93793 ANTICOAG MGMT PT WARFARIN: CPT | Mod: S$GLB,,, | Performed by: PHARMACIST

## 2020-03-16 PROCEDURE — 93793 PR ANTICOAGULANT MGMT FOR PT TAKING WARFARIN: ICD-10-PCS | Mod: S$GLB,,, | Performed by: PHARMACIST

## 2020-03-26 ENCOUNTER — CARE AT HOME (OUTPATIENT)
Dept: HOME HEALTH SERVICES | Facility: CLINIC | Age: 45
End: 2020-03-26
Payer: MEDICARE

## 2020-03-26 DIAGNOSIS — Z99.81 OXYGEN DEPENDENT: ICD-10-CM

## 2020-03-26 DIAGNOSIS — I27.9 CHRONIC PULMONARY HEART DISEASE: ICD-10-CM

## 2020-03-26 DIAGNOSIS — N18.30 CKD (CHRONIC KIDNEY DISEASE) STAGE 3, GFR 30-59 ML/MIN: ICD-10-CM

## 2020-03-26 DIAGNOSIS — I48.0 PAROXYSMAL ATRIAL FIBRILLATION: Primary | ICD-10-CM

## 2020-03-26 PROCEDURE — 3078F PR MOST RECENT DIASTOLIC BLOOD PRESSURE < 80 MM HG: ICD-10-PCS | Mod: CPTII,S$GLB,, | Performed by: NURSE PRACTITIONER

## 2020-03-26 PROCEDURE — 3078F DIAST BP <80 MM HG: CPT | Mod: CPTII,S$GLB,, | Performed by: NURSE PRACTITIONER

## 2020-03-26 PROCEDURE — 3008F BODY MASS INDEX DOCD: CPT | Mod: CPTII,S$GLB,, | Performed by: NURSE PRACTITIONER

## 2020-03-26 PROCEDURE — 99349 PR HOME VISIT,ESTAB PATIENT,LEVEL III: ICD-10-PCS | Mod: S$GLB,,, | Performed by: NURSE PRACTITIONER

## 2020-03-26 PROCEDURE — 99349 HOME/RES VST EST MOD MDM 40: CPT | Mod: S$GLB,,, | Performed by: NURSE PRACTITIONER

## 2020-03-26 PROCEDURE — 3074F SYST BP LT 130 MM HG: CPT | Mod: CPTII,S$GLB,, | Performed by: NURSE PRACTITIONER

## 2020-03-26 PROCEDURE — 3008F PR BODY MASS INDEX (BMI) DOCUMENTED: ICD-10-PCS | Mod: CPTII,S$GLB,, | Performed by: NURSE PRACTITIONER

## 2020-03-26 PROCEDURE — 3074F PR MOST RECENT SYSTOLIC BLOOD PRESSURE < 130 MM HG: ICD-10-PCS | Mod: CPTII,S$GLB,, | Performed by: NURSE PRACTITIONER

## 2020-03-27 VITALS
DIASTOLIC BLOOD PRESSURE: 76 MMHG | WEIGHT: 315 LBS | TEMPERATURE: 99 F | HEART RATE: 70 BPM | BODY MASS INDEX: 52.48 KG/M2 | RESPIRATION RATE: 18 BRPM | SYSTOLIC BLOOD PRESSURE: 128 MMHG | HEIGHT: 65 IN | OXYGEN SATURATION: 97 %

## 2020-03-27 NOTE — PROGRESS NOTES
[facility]  Transition of Care Home Visit    Visit Date: 3/26/2020  Encounter Provider: Kathrin Villarreal, NP  PCP:  Gianni Escalona MD    PRESENTING HISTORY      Patient ID: Yong Mcintyre is a 44 y.o. male.    Consult Requested By:  Kathrin Villarreal  Reason for Consult:  Hospital Follow up    Yong is being seen at home due to physical limitations and transportation issues.      Chief Complaint: Chronic Pulmonary Heart Disease; Oxygen Dependent      History of Present Illness: Mr. Yong Mcintyre is a 44 y.o. male who was recently admitted to [facility].    Date of Admission:  12/7/2019         Date of Discharge:  12/9/2019     Hospital course:  Patient admitted with diagnosis of ADHF and started on diuresis, but appears generally euvolemic. He describes vertiginous symptoms. Missy-Hallpike noted to be positive. Orthostasis is negative. Does have some recent URI symptoms. He did say he got some relief with meclizine.  I suspect BPPV may actually be responsible for his symptoms as he appears euvolemic, on home O2, and dizziness does appear to be improving. Suggested to patient f/u with PCP within 1 week to re-evaluate symptoms.  All questions answered to patient satisfaction.  ___________________________________________________________________    Today: Patient seen at home for followup visit post hospitalization 12/7/2019 and recent ED encounter. He is AAOx3, ambulatory, wearing home O2, 3L/NC with no complaints, no obvious signs of distress noted, VS stable. Patient denies complaints of dizziness, medications reviewed, patient on long term anticoag therapy, bleeding precautions reinforced, last anti coag visit with Dr. Rodriguez 12/9/2019, INR goal 2.0-3.0, last INR 2.3 (12/9/19). Patient receives his oxygen from [facility], has 2+ edema to BLE, instructed on the importance of elevating extremities, verbalized understanding. Dietary restrictions reinforced. No other concerns at this time. He  lives alone, no family present at visit. Reports he speaks with family daily and sees family at least 3 times a week.    Attestation: Screening criteria to assess the level of the patient's risk for infection with COVID-19 as recommended by the CDC at the time of the above documented home visit concluded appropriateness to proceed. Universal precautions were maintained at all times, including provider use of 60% alcohol gel hand  immediately prior to entry and upon departing the patient's home.      HPI:   Yong Mcintyre is a 44 year old male with a past medical history of Arthritis, CHF (congestive heart failure), Cor pulmonale, Diastolic dysfunction, Atrial Fibrillation, Gallstones, Hypertension, Left ventricular systolic dysfunction, Morbid obesity, MALLIKA (obstructive sleep apnea), PFO (patent foramen ovale), Pickwickian syndrome, and Pulmonary hypertension. He is on long term anticoagulation therapy with a INR goal 2.0-3.0. He denies a history of smoking, denies smokeless tobacco, denies drug use. He reports occasional alcohol use.     Review of Systems   Constitutional: Negative for chills and fever. Obese  HENT: Negative for congestion and sore throat.    Eyes: Negative for photophobia, pain and discharge.   Respiratory: Negative for cough, hemoptysis, sputum production and shortness of breath.    Cardiovascular: Negative for chest pain, palpitations and leg swelling.   Gastrointestinal: Negative for abdominal pain, diarrhea, nausea and vomiting.   Genitourinary: Negative for dysuria and urgency.   Musculoskeletal: Negative for myalgias and neck pain.   Skin: Negative for itching and rash.   Neurological: Negative for dizziness. Negative for sensory change, focal weakness and headaches.   Endo/Heme/Allergies: Negative for polydipsia. Does not bruise/bleed easily.   Psychiatric/Behavioral: Negative for depression and suicidal ideas.     Assessments:  · Environmental: single story home with 4 stairs to  enter, good lighting, not cluttered  · Functional Status: Independent with ADLs and mobility  · Safety: Fall precautions, Bleeding precautions, NO SMOKING oxygen in use  · Nutritional:Obese, dietary restrictions reinforced  · Home Health/DME/Supplies: Oxygen     PAST HISTORY:     Past Medical History:   Diagnosis Date    Arthritis     CHF (congestive heart failure)     Cor pulmonale 11/5/2012    Diastolic dysfunction     Gallstones     Hypertension     Left ventricular systolic dysfunction 11/5/2012    Morbid obesity 11/5/2012    Obesity     MALLIKA (obstructive sleep apnea)     PFO (patent foramen ovale) 11/5/2012    Pickwickian syndrome 11/5/2012    Pulmonary hypertension        History reviewed. No pertinent surgical history.    Family History   Problem Relation Age of Onset    Arthritis Mother     Asthma Mother     Hypertension Father     Asthma Sister     Mental illness Brother     Mental retardation Brother     Arthritis Maternal Grandmother     Asthma Maternal Grandmother     Diabetes Maternal Grandmother     Hypertension Maternal Grandmother     Arthritis Maternal Grandfather     Hypertension Maternal Grandfather     Hypertension Paternal Grandfather     Breast cancer Paternal Grandmother        Social History     Socioeconomic History    Marital status: Single     Spouse name: Not on file    Number of children: Not on file    Years of education: Not on file    Highest education level: Not on file   Occupational History    Not on file   Social Needs    Financial resource strain: Not on file    Food insecurity:     Worry: Not on file     Inability: Not on file    Transportation needs:     Medical: Not on file     Non-medical: Not on file   Tobacco Use    Smoking status: Never Smoker    Smokeless tobacco: Never Used   Substance and Sexual Activity    Alcohol use: Yes     Comment: holidays  rare    Drug use: No    Sexual activity: Not on file   Lifestyle    Physical  activity:     Days per week: Not on file     Minutes per session: Not on file    Stress: Not on file   Relationships    Social connections:     Talks on phone: Not on file     Gets together: Not on file     Attends Protestant service: Not on file     Active member of club or organization: Not on file     Attends meetings of clubs or organizations: Not on file     Relationship status: Not on file   Other Topics Concern    Not on file   Social History Narrative    Not on file       MEDICATIONS & ALLERGIES:     Current Outpatient Medications on File Prior to Visit   Medication Sig Dispense Refill    ADVAIR DISKUS 250-50 mcg/dose diskus inhaler inhale 1 dose by mouth twice a day 60 each 3    albuterol (VENTOLIN HFA) 90 mcg/actuation inhaler Inhale 2 puffs into the lungs every 4 (four) hours as needed for Wheezing. Rescue 18 g 2    allopurinoL (ZYLOPRIM) 300 MG tablet TAKE 1 TABLET BY MOUTH DAILY 90 tablet 3    bosentan (TRACLEER) 125 MG Tab Take 1 tablet (125 mg total) by mouth every 12 (twelve) hours. 60 tablet 4    cholecalciferol, vitamin D3, (VITAMIN D3) 2,000 unit Cap Take 1 capsule by mouth.      diclofenac sodium (VOLTAREN) 1 % Gel Apply 2 g topically 4 (four) times daily. for 10 days 100 g 0    furosemide (LASIX) 80 MG tablet Take 1 tablet (80 mg total) by mouth 2 (two) times daily. 180 tablet 3    magnesium oxide (MAG-OX) 400 mg tablet Take 1 tablet (400 mg total) by mouth 2 (two) times daily. 60 tablet 11    meclizine (ANTIVERT) 25 mg tablet Take 1 tablet (25 mg total) by mouth 3 (three) times daily as needed for Dizziness. 15 tablet 0    metOLazone (ZAROXOLYN) 2.5 MG tablet Take 2.5 mg by mouth 3 (three) times a week.   0    metoprolol tartrate (LOPRESSOR) 25 MG tablet TAKE 1 TABLET BY MOUTH TWICE DAILY 180 tablet 3    multivitamin (THERAGRAN) per tablet Take by mouth.      potassium chloride (MICRO-K) 10 MEQ CpSR Take 1 capsule (10 mEq total) by mouth once daily. 30 capsule 11    sildenafil  "(REVATIO) 20 mg Tab Take 20 mg by mouth 3 (three) times daily.       warfarin (COUMADIN) 10 MG tablet Patient takes 5mg (half-a-tablet) by mouth on Mondays and 10mg (1 tablet) by mouth the other days of the week, or as directed by coumadin clinic.       No current facility-administered medications on file prior to visit.         Review of patient's allergies indicates:   Allergen Reactions    Lipitor [atorvastatin] Other (See Comments)     "it makes my legs feel like jelly"  Other reaction(s): causes legs to hurt       OBJECTIVE:     Vital Signs:  Vitals:    03/26/20 0910   BP: 128/76   Pulse: 70   Resp: 18   Temp: 98.9 °F (37.2 °C)     Body mass index is 53.08 kg/m².     Physical Exam:  Physical Exam   Constitutional: He is oriented to person, place, and time. No distress.   HENT:   Head: Normocephalic and atraumatic.   Eyes: Pupils are equal, round, and reactive to light. Conjunctivae are normal.   Neck: Normal range of motion. Neck supple. No JVD present. No tracheal deviation present.   Cardiovascular: Normal rate, regular rhythm, normal heart sounds and intact distal pulses.   No murmur heard.  Pulmonary/Chest: Effort normal and breath sounds normal. No respiratory distress. He has no wheezes.   O2, 3L/NC  Abdominal: Soft. Bowel sounds are normal. He exhibits no distension. There is no tenderness.   Musculoskeletal: Normal range of motion. He exhibits edema. 2+ edema BLE   Neurological: He is alert and oriented to person, place, and time. He exhibits normal muscle tone.   Skin: Skin is warm and dry. He is not diaphoretic. No erythema.     Laboratory  Lab Results   Component Value Date    WBC 8.18 12/09/2019    HGB 14.2 12/09/2019    HCT 47.2 12/09/2019    MCV 98 12/09/2019     12/09/2019     Lab Results   Component Value Date    INR 2.9 (H) 03/16/2020    INR 3.4 (H) 03/02/2020    INR 2.6 (H) 02/17/2020     No results found for: HGBA1C  No results for input(s): POCTGLUCOSE in the last 72 " hours.    Diagnostic Results:  n/a    TRANSITION OF CARE:     [facility] Contact Note: 12/11/2019    Family and/or Caretaker present at visit?  No.  Diagnostic tests reviewed/disposition: No diagnosic tests pending after this hospitalization.  Disease/illness education: yes  Home health/community services discussion/referrals: Patient does not have home health established from hospital visit.  They do not need home health.  If needed, we will set up home health for the patient.   Establishment or re-establishment of referral orders for community resources: No other necessary community resources.   Discussion with other health care providers: No discussion with other health care providers necessary.     Transition of Care Visit:     I have reviewed and updated the history and problem list.  I have reconciled the medication list.  I have discussed the hospitalization and current medical issues, prognosis and plans with the patient/family.  I  spent more than 50% of time discussing the care with the patient/family.  Total Face-to-Face Encounter: 60 minutes.    Medications Reconciliation:   I have reconciled the patient's home medications and discharge medications with the patient/family. I have updated all changes.  Refer to After-Visit Medication List.    ASSESSMENT & PLAN:     HIGH RISK CONDITION(S):  Patient has a condition that poses threat to life and bodily function: Congestive Heart Failure    Yong was seen today for transitional care.    Diagnoses and all orders for this visit:    Chronic pulmonary heart disease, unspecified  PFO (patent foramen ovale)  Pulmonary Hypertension  Acute and chronic respiratory failure with hypercapnia  Shortness of breath  Oxygen dependent  -Orthostatic negative  -home O2, 3L/NC  -2D Echo (3/2019)  · Mildly decreased left ventricular systolic function. The estimated ejection fraction is 48% ( probably upper 40s to at most low 50s but lower on the auto EF)  · Mild global  hypokinetic wall motion.  · Septal wall has abnormal motion.  · Grade I (mild) left ventricular diastolic dysfunction consistent with impaired relaxation.  · Normal left atrial pressure.  · Mild right ventricular enlargement.  · Normal right ventricular systolic function.  · Mild pulmonic regurgitation.  · Normal central venous pressure (3 mm Hg).  · The estimated PA systolic pressure is 20 mm Hg        Paroxysmal atrial fibrillation  Long term current use of anticoagulant therapy  -Continue Coumadin and Lopressor  -INR goal 2.0-3.0  -Bleeding precautions    CKD (chronic kidney disease) stage 3, GFR 30-59 ml/min  -Monitor I&Os  -Cr wnl at hospitalization        Were controlled substances prescribed?  No  Instructions for the patient:    Scheduled Follow-up :  Future Appointments   Date Time Provider Department Center   4/6/2020 10:00 AM LAB, SAME DAY [facility]       After Visit Medication List :     Medication List           Accurate as of March 26, 2020 11:59 PM. If you have any questions, ask your nurse or doctor.               CONTINUE taking these medications    ADVAIR DISKUS 250-50 mcg/dose diskus inhaler  Generic drug:  fluticasone-salmeterol 250-50 mcg/dose  inhale 1 dose by mouth twice a day     allopurinoL 300 MG tablet  Commonly known as:  ZYLOPRIM  TAKE 1 TABLET BY MOUTH DAILY     bosentan 125 MG Tab  Commonly known as:  TRACLEER  Take 1 tablet (125 mg total) by mouth every 12 (twelve) hours.     cholecalciferol (vitamin D3) 50 mcg (2,000 unit) Cap  Commonly known as:  VITAMIN D3     furosemide 80 MG tablet  Commonly known as:  LASIX  Take 1 tablet (80 mg total) by mouth 2 (two) times daily.     magnesium oxide 400 mg (241.3 mg magnesium) tablet  Commonly known as:  MAG-OX  Take 1 tablet (400 mg total) by mouth 2 (two) times daily.     meclizine 25 mg tablet  Commonly known as:  ANTIVERT  Take 1 tablet (25 mg total) by mouth 3 (three) times daily as needed for Dizziness.     metOLazone 2.5  MG tablet  Commonly known as:  ZAROXOLYN     metoprolol tartrate 25 MG tablet  Commonly known as:  LOPRESSOR  TAKE 1 TABLET BY MOUTH TWICE DAILY     multivitamin per tablet  Commonly known as:  THERAGRAN     potassium chloride 10 MEQ Cpsr  Commonly known as:  MICRO-K  Take 1 capsule (10 mEq total) by mouth once daily.     REVATIO 20 mg Tab  Generic drug:  sildenafil     VENTOLIN HFA 90 mcg/actuation inhaler  Generic drug:  albuterol  Inhale 2 puffs into the lungs every 4 (four) hours as needed for Wheezing. Rescue     warfarin 10 MG tablet  Commonly known as:  COUMADIN  Patient takes 5mg (half-a-tablet) by mouth on Mondays and 10mg (1 tablet) by mouth the other days of the week, or as directed by coumadin clinic.            Signature:  Kathrin Villarreal NP    Patient consent obtained prior to treatment

## 2020-03-27 NOTE — PATIENT INSTRUCTIONS
Instructions:  1. Take all medications as prescribed  2. Keep all follow-up appointments  3. Return to the hospital or call your primary care physicians if any worsening symptoms such as fever, chest pain, shortness of breath, return of symptoms, or any other concerns.  4. Home visit in 4 weeks

## 2020-03-28 NOTE — PROGRESS NOTES
Hypotensive Subjective:    Patient ID:  Yong Mcintyre is a 41 y.o. male who presents for follow-up of CHF    HPI  42 yo AAM presents for biweekly outpatient IV diuretic tx. He remains on Lasix 80 mg bid, Tracleer and Revatio and is on home O2 at 3 LPM.   Today pt says he is feeling tired and c/o allergies acting up- runny/itchy nose. No worsening SOB or GOMEZ.    Review of Systems   Constitution: Negative for weight gain.   Cardiovascular: Positive for dyspnea on exertion and leg swelling.   Respiratory: Positive for shortness of breath.    Gastrointestinal: Negative for nausea.   Neurological: Negative for dizziness and light-headedness.        Objective:    Physical Exam   Constitutional: He is oriented to person, place, and time. He appears well-developed and well-nourished.   /70 (BP Location: Right arm, Patient Position: Sitting, BP Method: Automatic)   Pulse 86   Temp 98 °F (36.7 °C) (Oral)   Resp 15   Wt (!) 143.4 kg (316 lb 2.2 oz)   BMI 52.61 kg/m²      Neck: No JVD present.   Cardiovascular: Normal rate, regular rhythm and normal heart sounds.    Pulses:       Radial pulses are 2+ on the right side, and 2+ on the left side.   Pulmonary/Chest: Effort normal and breath sounds normal.   Abdominal: Soft. Bowel sounds are normal. There is no tenderness.   Musculoskeletal: He exhibits edema.   Neurological: He is alert and oriented to person, place, and time.   Skin: Skin is warm and dry.         Assessment:       1. Paroxysmal atrial fibrillation    2. Pulmonary hypertension    3. Chronic pulmonary heart disease    4. Primary pulmonary hypertension    5. Chronic cardiopulmonary disease         Plan:       Lasix 80 mg IVP, then 20 mg/hr X 6 hours.  Continue biweekly outpatient infusions.

## 2020-04-03 ENCOUNTER — PATIENT MESSAGE (OUTPATIENT)
Dept: ADMINISTRATIVE | Facility: OTHER | Age: 45
End: 2020-04-03

## 2020-04-09 ENCOUNTER — PATIENT MESSAGE (OUTPATIENT)
Dept: INTERNAL MEDICINE | Facility: CLINIC | Age: 45
End: 2020-04-09

## 2020-04-09 RX ORDER — POTASSIUM CHLORIDE 750 MG/1
10 CAPSULE, EXTENDED RELEASE ORAL DAILY
Qty: 30 CAPSULE | Refills: 11 | Status: SHIPPED | OUTPATIENT
Start: 2020-04-09 | End: 2021-03-07 | Stop reason: SDUPTHER

## 2020-04-10 NOTE — PROGRESS NOTES
4/10 - labs scheduled this week missed. INRs generally in range so will attempt repeat in early May 2/2 Covid concerns.

## 2020-04-23 ENCOUNTER — OFFICE VISIT (OUTPATIENT)
Dept: HOME HEALTH SERVICES | Facility: CLINIC | Age: 45
End: 2020-04-23
Payer: MEDICARE

## 2020-04-23 DIAGNOSIS — Z99.81 OXYGEN DEPENDENT: ICD-10-CM

## 2020-04-23 DIAGNOSIS — R06.02 SOB (SHORTNESS OF BREATH): ICD-10-CM

## 2020-04-23 DIAGNOSIS — N18.30 CKD (CHRONIC KIDNEY DISEASE) STAGE 3, GFR 30-59 ML/MIN: ICD-10-CM

## 2020-04-23 DIAGNOSIS — J96.20 ACUTE ON CHRONIC RESPIRATORY FAILURE, UNSPECIFIED WHETHER WITH HYPOXIA OR HYPERCAPNIA: ICD-10-CM

## 2020-04-23 DIAGNOSIS — J96.22 ACUTE AND CHRONIC RESPIRATORY FAILURE WITH HYPERCAPNIA: Primary | ICD-10-CM

## 2020-04-23 DIAGNOSIS — I48.0 PAROXYSMAL ATRIAL FIBRILLATION: ICD-10-CM

## 2020-04-23 PROCEDURE — 99441 PR PHYSICIAN TELEPHONE EVALUATION 5-10 MIN: CPT | Mod: 95,,, | Performed by: NURSE PRACTITIONER

## 2020-04-23 PROCEDURE — 99441 PR PHYSICIAN TELEPHONE EVALUATION 5-10 MIN: ICD-10-PCS | Mod: 95,,, | Performed by: NURSE PRACTITIONER

## 2020-04-23 NOTE — PROGRESS NOTES
Established Patient - Audio Only Telehealth Visit     The patient location is: Home  The chief complaint leading to consultation is: Medical Follow up  Visit type: Virtual visit with audio only (telephone)     The reason for the audio only service rather than synchronous audio and video virtual visit was related to technical difficulties or patient preference/necessity.     Each patient to whom I provide medical services by telemedicine is:  (1) informed of the relationship between the physician and patient and the respective role of any other health care provider with respect to management of the patient; and (2) notified that they may decline to receive medical services by telemedicine and may withdraw from such care at any time. Patient verbally consented to receive this service via voice-only telephone call.       HPI: Yong Mcintyre is a 44 year old male with a past medical history of Arthritis, CHF (congestive heart failure), Cor pulmonale, Diastolic dysfunction, Atrial Fibrillation, Gallstones, Hypertension, Left ventricular systolic dysfunction, Morbid obesity, MALLIKA (obstructive sleep apnea), PFO (patent foramen ovale), Pickwickian syndrome, and Pulmonary hypertension. He is on long term anticoagulation therapy with a INR goal 2.0-3.0. He denies a history of smoking, denies smokeless tobacco, denies drug use. He reports occasional alcohol use.     Today, he is being evaluated as an audio follow up due to the COVID19 pandemic. He denies recent travel, exposure to known COVID19 patient, fever or headache. Medication profile reviewed, no refills required at this time.     Assessment and plan    Chronic pulmonary heart disease, unspecified  PFO (patent foramen ovale)  Pulmonary Hypertension  Acute and chronic respiratory failure with hypercapnia  Shortness of breath  Oxygen dependent  -home O2, 3L/NC  -2D Echo (3/2019)  · Mildly decreased left ventricular systolic function. The estimated ejection fraction is 48% (  probably upper 40s to at most low 50s but lower on the auto EF)  · Mild global hypokinetic wall motion.  · Septal wall has abnormal motion.  · Grade I (mild) left ventricular diastolic dysfunction consistent with impaired relaxation.  · Normal left atrial pressure.  · Mild right ventricular enlargement.  · Normal right ventricular systolic function.  · Mild pulmonic regurgitation.  · Normal central venous pressure (3 mm Hg).  · The estimated PA systolic pressure is 20 mm Hg           Paroxysmal atrial fibrillation  Long term current use of anticoagulant therapy  -Continue Coumadin and Lopressor  -INR goal 2.0-3.0  -Bleeding precautions     CKD (chronic kidney disease) stage 3, GFR 30-59 ml/min  -Monitor I&Os            This service was not originating from a related E/M service provided within the previous 7 days nor will  to an E/M service or procedure within the next 24 hours or my soonest available appointment.  Prevailing standard of care was able to be met in this audio-only visit.          Joy Chairs, FNP-C [facility]

## 2020-05-06 ENCOUNTER — ANTI-COAG VISIT (OUTPATIENT)
Dept: CARDIOLOGY | Facility: CLINIC | Age: 45
End: 2020-05-06
Payer: MEDICARE

## 2020-05-06 ENCOUNTER — LAB VISIT (OUTPATIENT)
Dept: LAB | Facility: HOSPITAL | Age: 45
End: 2020-05-06
Payer: MEDICARE

## 2020-05-06 DIAGNOSIS — Z79.01 LONG TERM CURRENT USE OF ANTICOAGULANT THERAPY: ICD-10-CM

## 2020-05-06 DIAGNOSIS — I27.9 CHRONIC PULMONARY HEART DISEASE: ICD-10-CM

## 2020-05-06 LAB
INR PPP: 1.7 (ref 0.8–1.2)
PROTHROMBIN TIME: 16.7 SEC (ref 9–12.5)

## 2020-05-06 PROCEDURE — 36415 COLL VENOUS BLD VENIPUNCTURE: CPT | Mod: HCNC

## 2020-05-06 PROCEDURE — 93793 PR ANTICOAGULANT MGMT FOR PT TAKING WARFARIN: ICD-10-PCS | Mod: S$GLB,,, | Performed by: PHARMACIST

## 2020-05-06 PROCEDURE — 93793 ANTICOAG MGMT PT WARFARIN: CPT | Mod: S$GLB,,, | Performed by: PHARMACIST

## 2020-05-06 PROCEDURE — 85610 PROTHROMBIN TIME: CPT | Mod: HCNC

## 2020-05-06 NOTE — PROGRESS NOTES
Confirmed taking correct dose of coumadin  Mixed veggies recently & only eating iceberg w/ cucumber not peeled   NO othr changes

## 2020-05-25 ENCOUNTER — ANTI-COAG VISIT (OUTPATIENT)
Dept: CARDIOLOGY | Facility: CLINIC | Age: 45
End: 2020-05-25
Payer: MEDICARE

## 2020-05-25 ENCOUNTER — LAB VISIT (OUTPATIENT)
Dept: LAB | Facility: HOSPITAL | Age: 45
End: 2020-05-25
Attending: INTERNAL MEDICINE
Payer: MEDICARE

## 2020-05-25 DIAGNOSIS — Z79.01 LONG TERM CURRENT USE OF ANTICOAGULANT THERAPY: ICD-10-CM

## 2020-05-25 DIAGNOSIS — I27.9 CHRONIC PULMONARY HEART DISEASE: ICD-10-CM

## 2020-05-25 LAB
INR PPP: 2.8 (ref 0.8–1.2)
PROTHROMBIN TIME: 26.7 SEC (ref 9–12.5)

## 2020-05-25 PROCEDURE — 93793 PR ANTICOAGULANT MGMT FOR PT TAKING WARFARIN: ICD-10-PCS | Mod: S$GLB,,, | Performed by: PHARMACIST

## 2020-05-25 PROCEDURE — 93793 ANTICOAG MGMT PT WARFARIN: CPT | Mod: S$GLB,,, | Performed by: PHARMACIST

## 2020-05-25 PROCEDURE — 85610 PROTHROMBIN TIME: CPT | Mod: HCNC

## 2020-05-25 PROCEDURE — 36415 COLL VENOUS BLD VENIPUNCTURE: CPT | Mod: HCNC

## 2020-05-27 DIAGNOSIS — E66.2 MORBID (SEVERE) OBESITY WITH ALVEOLAR HYPOVENTILATION: ICD-10-CM

## 2020-05-27 DIAGNOSIS — I27.20 PULMONARY HYPERTENSION: Primary | ICD-10-CM

## 2020-06-22 ENCOUNTER — LAB VISIT (OUTPATIENT)
Dept: LAB | Facility: HOSPITAL | Age: 45
End: 2020-06-22
Attending: INTERNAL MEDICINE
Payer: MEDICARE

## 2020-06-22 ENCOUNTER — ANTI-COAG VISIT (OUTPATIENT)
Dept: CARDIOLOGY | Facility: CLINIC | Age: 45
End: 2020-06-22
Payer: MEDICARE

## 2020-06-22 DIAGNOSIS — I27.9 CHRONIC PULMONARY HEART DISEASE: ICD-10-CM

## 2020-06-22 DIAGNOSIS — Z79.01 LONG TERM CURRENT USE OF ANTICOAGULANT THERAPY: ICD-10-CM

## 2020-06-22 LAB
INR PPP: 1.8 (ref 0.8–1.2)
PROTHROMBIN TIME: 17.7 SEC (ref 9–12.5)

## 2020-06-22 PROCEDURE — 93793 ANTICOAG MGMT PT WARFARIN: CPT | Mod: S$GLB,,, | Performed by: PHARMACIST

## 2020-06-22 PROCEDURE — 93793 PR ANTICOAGULANT MGMT FOR PT TAKING WARFARIN: ICD-10-PCS | Mod: S$GLB,,, | Performed by: PHARMACIST

## 2020-06-22 PROCEDURE — 85610 PROTHROMBIN TIME: CPT | Mod: HCNC

## 2020-06-22 PROCEDURE — 36415 COLL VENOUS BLD VENIPUNCTURE: CPT | Mod: HCNC

## 2020-07-06 ENCOUNTER — LAB VISIT (OUTPATIENT)
Dept: LAB | Facility: HOSPITAL | Age: 45
End: 2020-07-06
Attending: INTERNAL MEDICINE
Payer: MEDICARE

## 2020-07-06 ENCOUNTER — ANTI-COAG VISIT (OUTPATIENT)
Dept: CARDIOLOGY | Facility: CLINIC | Age: 45
End: 2020-07-06
Payer: MEDICARE

## 2020-07-06 DIAGNOSIS — Z79.01 LONG TERM CURRENT USE OF ANTICOAGULANT THERAPY: ICD-10-CM

## 2020-07-06 DIAGNOSIS — I27.9 CHRONIC PULMONARY HEART DISEASE: ICD-10-CM

## 2020-07-06 LAB
INR PPP: 3.2 (ref 0.8–1.2)
PROTHROMBIN TIME: 30.4 SEC (ref 9–12.5)

## 2020-07-06 PROCEDURE — 85610 PROTHROMBIN TIME: CPT | Mod: HCNC

## 2020-07-06 PROCEDURE — 93793 PR ANTICOAGULANT MGMT FOR PT TAKING WARFARIN: ICD-10-PCS | Mod: S$GLB,,, | Performed by: PHARMACIST

## 2020-07-06 PROCEDURE — 36415 COLL VENOUS BLD VENIPUNCTURE: CPT | Mod: HCNC

## 2020-07-06 PROCEDURE — 93793 ANTICOAG MGMT PT WARFARIN: CPT | Mod: S$GLB,,, | Performed by: PHARMACIST

## 2020-08-03 ENCOUNTER — LAB VISIT (OUTPATIENT)
Dept: LAB | Facility: HOSPITAL | Age: 45
End: 2020-08-03
Attending: INTERNAL MEDICINE
Payer: MEDICARE

## 2020-08-03 DIAGNOSIS — I27.9 CHRONIC PULMONARY HEART DISEASE: ICD-10-CM

## 2020-08-03 DIAGNOSIS — Z79.01 LONG TERM CURRENT USE OF ANTICOAGULANT THERAPY: ICD-10-CM

## 2020-08-03 LAB
INR PPP: 2 (ref 0.8–1.2)
PROTHROMBIN TIME: 21.3 SEC (ref 9–12.5)

## 2020-08-03 PROCEDURE — 85610 PROTHROMBIN TIME: CPT | Mod: HCNC

## 2020-08-04 ENCOUNTER — ANTI-COAG VISIT (OUTPATIENT)
Dept: CARDIOLOGY | Facility: CLINIC | Age: 45
End: 2020-08-04
Payer: MEDICARE

## 2020-08-04 DIAGNOSIS — I27.9 CHRONIC PULMONARY HEART DISEASE: ICD-10-CM

## 2020-08-04 DIAGNOSIS — Z79.01 LONG TERM CURRENT USE OF ANTICOAGULANT THERAPY: ICD-10-CM

## 2020-08-04 PROCEDURE — 93793 ANTICOAG MGMT PT WARFARIN: CPT | Mod: S$GLB,,, | Performed by: PHARMACIST

## 2020-08-04 PROCEDURE — 93793 PR ANTICOAGULANT MGMT FOR PT TAKING WARFARIN: ICD-10-PCS | Mod: S$GLB,,, | Performed by: PHARMACIST

## 2020-08-28 ENCOUNTER — PES CALL (OUTPATIENT)
Dept: ADMINISTRATIVE | Facility: CLINIC | Age: 45
End: 2020-08-28

## 2020-10-05 ENCOUNTER — LAB VISIT (OUTPATIENT)
Dept: LAB | Facility: HOSPITAL | Age: 45
End: 2020-10-05
Attending: INTERNAL MEDICINE
Payer: MEDICARE

## 2020-10-05 DIAGNOSIS — Z79.01 LONG TERM CURRENT USE OF ANTICOAGULANT THERAPY: ICD-10-CM

## 2020-10-05 DIAGNOSIS — I27.9 CHRONIC PULMONARY HEART DISEASE: ICD-10-CM

## 2020-10-05 LAB
INR PPP: 2.6 (ref 0.8–1.2)
PROTHROMBIN TIME: 27.8 SEC (ref 9–12.5)

## 2020-10-05 PROCEDURE — 85610 PROTHROMBIN TIME: CPT | Mod: HCNC

## 2020-10-06 ENCOUNTER — ANTI-COAG VISIT (OUTPATIENT)
Dept: CARDIOLOGY | Facility: CLINIC | Age: 45
End: 2020-10-06
Payer: MEDICARE

## 2020-10-06 DIAGNOSIS — Z79.01 LONG TERM CURRENT USE OF ANTICOAGULANT THERAPY: ICD-10-CM

## 2020-10-06 DIAGNOSIS — I27.9 CHRONIC PULMONARY HEART DISEASE: ICD-10-CM

## 2020-10-06 PROCEDURE — 93793 ANTICOAG MGMT PT WARFARIN: CPT | Mod: S$GLB,,, | Performed by: PHARMACIST

## 2020-10-06 PROCEDURE — 93793 PR ANTICOAGULANT MGMT FOR PT TAKING WARFARIN: ICD-10-PCS | Mod: S$GLB,,, | Performed by: PHARMACIST

## 2020-10-06 NOTE — PROGRESS NOTES
Advised patient, he verified the correct dose and was advised to keep taking the same dose of coumadin, given redraw date, and there were no changes.

## 2020-10-07 ENCOUNTER — PATIENT MESSAGE (OUTPATIENT)
Dept: ADMINISTRATIVE | Facility: HOSPITAL | Age: 45
End: 2020-10-07

## 2020-10-09 ENCOUNTER — PATIENT OUTREACH (OUTPATIENT)
Dept: ADMINISTRATIVE | Facility: OTHER | Age: 45
End: 2020-10-09

## 2020-10-09 NOTE — PROGRESS NOTES
Health Maintenance Due   Topic Date Due    TETANUS VACCINE  09/29/1993    Pneumococcal Vaccine (Medium Risk) (1 of 1 - PPSV23) 09/29/1994    Lipid Panel  07/13/2020     Updates were requested from care everywhere.  Chart was reviewed for overdue Proactive [facility] ([facility]) topics (CRS, Breast Cancer Screening, Eye exam)  Health Maintenance has been updated.  LINKS immunization registry triggered.  Immunizations were reconciled.

## 2020-10-13 ENCOUNTER — OFFICE VISIT (OUTPATIENT)
Dept: SLEEP MEDICINE | Facility: CLINIC | Age: 45
End: 2020-10-13
Payer: MEDICARE

## 2020-10-13 VITALS
SYSTOLIC BLOOD PRESSURE: 112 MMHG | HEART RATE: 105 BPM | HEIGHT: 65 IN | WEIGHT: 315 LBS | BODY MASS INDEX: 52.48 KG/M2 | DIASTOLIC BLOOD PRESSURE: 60 MMHG

## 2020-10-13 DIAGNOSIS — G47.33 OBSTRUCTIVE SLEEP APNEA: Primary | ICD-10-CM

## 2020-10-13 PROCEDURE — 99499 UNLISTED E&M SERVICE: CPT | Mod: S$GLB,,, | Performed by: NURSE PRACTITIONER

## 2020-10-13 PROCEDURE — 3078F DIAST BP <80 MM HG: CPT | Mod: HCNC,CPTII,S$GLB, | Performed by: NURSE PRACTITIONER

## 2020-10-13 PROCEDURE — 99999 PR PBB SHADOW E&M-EST. PATIENT-LVL III: CPT | Mod: PBBFAC,HCNC,, | Performed by: NURSE PRACTITIONER

## 2020-10-13 PROCEDURE — 99202 OFFICE O/P NEW SF 15 MIN: CPT | Mod: HCNC,S$GLB,, | Performed by: NURSE PRACTITIONER

## 2020-10-13 PROCEDURE — 3008F PR BODY MASS INDEX (BMI) DOCUMENTED: ICD-10-PCS | Mod: HCNC,CPTII,S$GLB, | Performed by: NURSE PRACTITIONER

## 2020-10-13 PROCEDURE — 99999 PR PBB SHADOW E&M-EST. PATIENT-LVL III: ICD-10-PCS | Mod: PBBFAC,HCNC,, | Performed by: NURSE PRACTITIONER

## 2020-10-13 PROCEDURE — 3074F PR MOST RECENT SYSTOLIC BLOOD PRESSURE < 130 MM HG: ICD-10-PCS | Mod: HCNC,CPTII,S$GLB, | Performed by: NURSE PRACTITIONER

## 2020-10-13 PROCEDURE — 3078F PR MOST RECENT DIASTOLIC BLOOD PRESSURE < 80 MM HG: ICD-10-PCS | Mod: HCNC,CPTII,S$GLB, | Performed by: NURSE PRACTITIONER

## 2020-10-13 PROCEDURE — 99499 RISK ADDL DX/OHS AUDIT: ICD-10-PCS | Mod: S$GLB,,, | Performed by: NURSE PRACTITIONER

## 2020-10-13 PROCEDURE — 99202 PR OFFICE/OUTPT VISIT, NEW, LEVL II, 15-29 MIN: ICD-10-PCS | Mod: HCNC,S$GLB,, | Performed by: NURSE PRACTITIONER

## 2020-10-13 PROCEDURE — 3008F BODY MASS INDEX DOCD: CPT | Mod: HCNC,CPTII,S$GLB, | Performed by: NURSE PRACTITIONER

## 2020-10-13 PROCEDURE — 3074F SYST BP LT 130 MM HG: CPT | Mod: HCNC,CPTII,S$GLB, | Performed by: NURSE PRACTITIONER

## 2020-10-13 NOTE — PROGRESS NOTES
"Mr. Mcintyre returns to the clinic today for followup on obstructive sleep apnea. He was last seen 2017. He continues to wear his BPAP 16/11 with 3 L at night (continuous O2). Reports BPAP is still helping him; no break through snoring. Prefers to watch tv all night in den then (avoids bedroom until sleepy) sleep 4-12p, but often its more like 6-12pm. Just awoke today 1430, not eaten yet. Oral drying in am, keeps humidity at 3/5. Dad and friend now on CPAP. Back in gym now.   Fifi Hernandez, permanent filter dirty  Encore:  DATE RANGE: 7/16/2020 - 10/13/2020   0-% leak  Compliance Summary  Days with Device Usage: 90 days  Percentage of Days >=4 Hours: 93.3%  Average Usage (Days Used): 7 hrs. 20 mins. 52 secs.  Average Usage (All Days): 7 hrs. 20 mins. 52 secs.  Apnea Indices  Average AHI: 1.7  Stopped benadryl qhs, didn't want to get dependent  ESS=4    PSG 5/29/08:  and SaO2 of 62% (sleep efficiency 90.7%).  CPAP titration on 10/13/11: Effective control of respiratory events was achieved at 12/8 with best results achieved at 14/9 cm of H2O. Weight 250 lbs.  Titration study 4/30/14: Effective control of sleep disordered breathing was seen in supine REM sleep on 16/11 cm H2O. EPAP lower than 11 cm H2O was associated obstructive apneas. Higher IPAP 17-18 cm H2O were associated with central apneas    REVIEW OF SYSTEMS: Sleep related symptoms as per HPI.12# gain. Otherwise a balance review of 10-systems is negative.       PHYSICAL EXAM:   /60   Pulse 105   Ht 5' 5" (1.651 m)   Wt (!) 151 kg (332 lb 14.3 oz)   BMI 55.40 kg/m²   GENERAL: W/D, morbid obese,  well groomed    Regular heart rhythm    IMPRESSION:   1. Obstructive sleep apnea - overlap syndrome. Tolerates 16/11with 3 LO2. Continues to benefit from therapy AHI<5  2. Chronic Pulmonary  Disease, pulmonary HTN   3. Morbid obesity  Paroxysmal atrial fibrillation      PLAN:  Continue BPAP 16/11cm. Continue  3L O2 with pulm f/u as advised.     Discussed " health benefits of continued BPAP compliance and the potential ramifications of untreated sleep apnea, which could include daytime sleepiness, hypertension, heart disease and/or stroke. Advised not to drive if sleepy.   Continue avoiding bedroom until sleepy,CBT-I discussed. Defers trazadone.    Encouraged continued weight loss efforts for potential improvement of MALLIKA and overall health benefits and see cards as advised/continue meds  RTC 2-yrs, sooner if needed.

## 2020-11-02 ENCOUNTER — LAB VISIT (OUTPATIENT)
Dept: LAB | Facility: HOSPITAL | Age: 45
End: 2020-11-02
Payer: MEDICARE

## 2020-11-02 DIAGNOSIS — Z79.01 LONG TERM CURRENT USE OF ANTICOAGULANT THERAPY: ICD-10-CM

## 2020-11-02 DIAGNOSIS — I27.9 CHRONIC PULMONARY HEART DISEASE: ICD-10-CM

## 2020-11-02 LAB
INR PPP: 0.9 (ref 0.8–1.2)
PROTHROMBIN TIME: 10.5 SEC (ref 9–12.5)

## 2020-11-02 PROCEDURE — 85610 PROTHROMBIN TIME: CPT | Mod: HCNC

## 2020-11-02 PROCEDURE — 36415 COLL VENOUS BLD VENIPUNCTURE: CPT | Mod: HCNC

## 2020-11-03 ENCOUNTER — ANTI-COAG VISIT (OUTPATIENT)
Dept: CARDIOLOGY | Facility: CLINIC | Age: 45
End: 2020-11-03
Payer: MEDICARE

## 2020-11-03 DIAGNOSIS — I27.9 CHRONIC PULMONARY HEART DISEASE: ICD-10-CM

## 2020-11-03 DIAGNOSIS — I27.81 COR PULMONALE: ICD-10-CM

## 2020-11-03 DIAGNOSIS — E66.01 MORBID OBESITY: ICD-10-CM

## 2020-11-03 DIAGNOSIS — G47.33 OSA (OBSTRUCTIVE SLEEP APNEA): ICD-10-CM

## 2020-11-03 DIAGNOSIS — Z79.01 LONG TERM CURRENT USE OF ANTICOAGULANT THERAPY: ICD-10-CM

## 2020-11-03 DIAGNOSIS — E66.2 PICKWICKIAN SYNDROME: ICD-10-CM

## 2020-11-03 DIAGNOSIS — I27.9 CHRONIC CARDIOPULMONARY DISEASE: ICD-10-CM

## 2020-11-03 PROCEDURE — 93793 ANTICOAG MGMT PT WARFARIN: CPT | Mod: S$GLB,,, | Performed by: PHARMACIST

## 2020-11-03 PROCEDURE — 93793 PR ANTICOAGULANT MGMT FOR PT TAKING WARFARIN: ICD-10-PCS | Mod: S$GLB,,, | Performed by: PHARMACIST

## 2020-11-04 RX ORDER — WARFARIN 10 MG/1
TABLET ORAL
Qty: 90 TABLET | Refills: 3
Start: 2020-11-04 | End: 2020-11-10

## 2020-11-04 NOTE — PROGRESS NOTES
Patient missed Coumadin on 11/1, 11/2, 11/3 becausehe is out of Coumadin and needs escript sent to pharmacy listed in chart.  He reports single nose bleed on 10/28 that bleed longer than 15 minutes but no other nose bleeds.

## 2020-11-04 NOTE — PROGRESS NOTES
Pt states he have been having problems with a nose bleed. He is trying to get in contact with his provider to talk about it. Pt confirm doses. Denies any other changes.

## 2020-11-09 DIAGNOSIS — E66.2 PICKWICKIAN SYNDROME: ICD-10-CM

## 2020-11-09 DIAGNOSIS — I27.9 CHRONIC PULMONARY HEART DISEASE: ICD-10-CM

## 2020-11-09 DIAGNOSIS — I27.81 COR PULMONALE: ICD-10-CM

## 2020-11-09 DIAGNOSIS — G47.33 OSA (OBSTRUCTIVE SLEEP APNEA): ICD-10-CM

## 2020-11-09 DIAGNOSIS — E66.01 MORBID OBESITY: ICD-10-CM

## 2020-11-09 DIAGNOSIS — I27.9 CHRONIC CARDIOPULMONARY DISEASE: ICD-10-CM

## 2020-11-09 DIAGNOSIS — Z79.01 LONG TERM CURRENT USE OF ANTICOAGULANT THERAPY: ICD-10-CM

## 2020-11-09 RX ORDER — WARFARIN 10 MG/1
TABLET ORAL
Qty: 90 TABLET | Refills: 3 | Status: CANCELLED
Start: 2020-11-09

## 2020-11-09 NOTE — TELEPHONE ENCOUNTER
Telephoned patient, left message to return call----- Message from Krys Garza sent at 11/9/2020  4:46 PM CST -----  Contact: Self   Nose bleeds have stopped. Please advise

## 2020-12-02 ENCOUNTER — LAB VISIT (OUTPATIENT)
Dept: LAB | Facility: HOSPITAL | Age: 45
End: 2020-12-02
Payer: MEDICARE

## 2020-12-02 DIAGNOSIS — Z79.01 LONG TERM CURRENT USE OF ANTICOAGULANT THERAPY: ICD-10-CM

## 2020-12-02 DIAGNOSIS — I27.9 CHRONIC PULMONARY HEART DISEASE: ICD-10-CM

## 2020-12-02 LAB
INR PPP: 2.1 (ref 0.8–1.2)
PROTHROMBIN TIME: 22.1 SEC (ref 9–12.5)

## 2020-12-02 PROCEDURE — 85610 PROTHROMBIN TIME: CPT | Mod: HCNC

## 2020-12-02 PROCEDURE — 36415 COLL VENOUS BLD VENIPUNCTURE: CPT | Mod: HCNC

## 2020-12-03 ENCOUNTER — ANTI-COAG VISIT (OUTPATIENT)
Dept: CARDIOLOGY | Facility: CLINIC | Age: 45
End: 2020-12-03
Payer: MEDICARE

## 2020-12-03 DIAGNOSIS — I27.9 CHRONIC PULMONARY HEART DISEASE: ICD-10-CM

## 2020-12-03 DIAGNOSIS — Z79.01 LONG TERM CURRENT USE OF ANTICOAGULANT THERAPY: ICD-10-CM

## 2021-01-04 ENCOUNTER — PATIENT MESSAGE (OUTPATIENT)
Dept: ADMINISTRATIVE | Facility: HOSPITAL | Age: 46
End: 2021-01-04

## 2021-01-11 ENCOUNTER — LAB VISIT (OUTPATIENT)
Dept: LAB | Facility: HOSPITAL | Age: 46
End: 2021-01-11
Payer: MEDICARE

## 2021-01-11 ENCOUNTER — ANTI-COAG VISIT (OUTPATIENT)
Dept: CARDIOLOGY | Facility: CLINIC | Age: 46
End: 2021-01-11
Payer: MEDICARE

## 2021-01-11 DIAGNOSIS — I27.9 CHRONIC PULMONARY HEART DISEASE: ICD-10-CM

## 2021-01-11 DIAGNOSIS — Z79.01 LONG TERM CURRENT USE OF ANTICOAGULANT THERAPY: ICD-10-CM

## 2021-01-11 LAB
INR PPP: 3.8 (ref 0.8–1.2)
PROTHROMBIN TIME: 38.7 SEC (ref 9–12.5)

## 2021-01-11 PROCEDURE — 93793 PR ANTICOAGULANT MGMT FOR PT TAKING WARFARIN: ICD-10-PCS | Mod: S$GLB,,, | Performed by: PHARMACIST

## 2021-01-11 PROCEDURE — 36415 COLL VENOUS BLD VENIPUNCTURE: CPT | Mod: HCNC

## 2021-01-11 PROCEDURE — 85610 PROTHROMBIN TIME: CPT | Mod: HCNC

## 2021-01-11 PROCEDURE — 93793 ANTICOAG MGMT PT WARFARIN: CPT | Mod: S$GLB,,, | Performed by: PHARMACIST

## 2021-01-20 ENCOUNTER — LAB VISIT (OUTPATIENT)
Dept: LAB | Facility: HOSPITAL | Age: 46
End: 2021-01-20
Payer: MEDICARE

## 2021-01-20 DIAGNOSIS — I27.20 PULMONARY HYPERTENSION: ICD-10-CM

## 2021-01-20 DIAGNOSIS — I27.20 PULMONARY HYPERTENSION: Primary | ICD-10-CM

## 2021-01-20 LAB
ALBUMIN SERPL BCP-MCNC: 2.9 G/DL (ref 3.5–5.2)
ALP SERPL-CCNC: 262 U/L (ref 55–135)
ALT SERPL W/O P-5'-P-CCNC: 61 U/L (ref 10–44)
AST SERPL-CCNC: 46 U/L (ref 10–40)
BILIRUB DIRECT SERPL-MCNC: 0.4 MG/DL (ref 0.1–0.3)
BILIRUB SERPL-MCNC: 0.7 MG/DL (ref 0.1–1)
PROT SERPL-MCNC: 8.9 G/DL (ref 6–8.4)

## 2021-01-20 PROCEDURE — 80076 HEPATIC FUNCTION PANEL: CPT | Mod: HCNC

## 2021-01-20 PROCEDURE — 36415 COLL VENOUS BLD VENIPUNCTURE: CPT | Mod: HCNC

## 2021-01-25 ENCOUNTER — LAB VISIT (OUTPATIENT)
Dept: LAB | Facility: HOSPITAL | Age: 46
End: 2021-01-25
Payer: MEDICARE

## 2021-01-25 DIAGNOSIS — Z79.01 LONG TERM CURRENT USE OF ANTICOAGULANT THERAPY: ICD-10-CM

## 2021-01-25 DIAGNOSIS — I27.9 CHRONIC PULMONARY HEART DISEASE: ICD-10-CM

## 2021-01-25 LAB
INR PPP: 2.7 (ref 0.8–1.2)
PROTHROMBIN TIME: 28.2 SEC (ref 9–12.5)

## 2021-01-25 PROCEDURE — 85610 PROTHROMBIN TIME: CPT | Mod: HCNC

## 2021-01-25 PROCEDURE — 36415 COLL VENOUS BLD VENIPUNCTURE: CPT | Mod: HCNC

## 2021-01-26 ENCOUNTER — ANTI-COAG VISIT (OUTPATIENT)
Dept: CARDIOLOGY | Facility: CLINIC | Age: 46
End: 2021-01-26
Payer: MEDICARE

## 2021-01-26 DIAGNOSIS — I27.9 CHRONIC PULMONARY HEART DISEASE: Primary | ICD-10-CM

## 2021-01-26 DIAGNOSIS — Z79.01 LONG TERM CURRENT USE OF ANTICOAGULANT THERAPY: ICD-10-CM

## 2021-01-26 PROCEDURE — 93793 ANTICOAG MGMT PT WARFARIN: CPT | Mod: S$GLB,,, | Performed by: PHARMACIST

## 2021-01-26 PROCEDURE — 93793 PR ANTICOAGULANT MGMT FOR PT TAKING WARFARIN: ICD-10-PCS | Mod: S$GLB,,, | Performed by: PHARMACIST

## 2021-02-08 ENCOUNTER — LAB VISIT (OUTPATIENT)
Dept: LAB | Facility: HOSPITAL | Age: 46
End: 2021-02-08
Attending: INTERNAL MEDICINE
Payer: MEDICARE

## 2021-02-08 DIAGNOSIS — I27.9 CHRONIC PULMONARY HEART DISEASE: ICD-10-CM

## 2021-02-08 DIAGNOSIS — I27.20 PULMONARY HYPERTENSION: ICD-10-CM

## 2021-02-08 DIAGNOSIS — Z79.01 LONG TERM CURRENT USE OF ANTICOAGULANT THERAPY: ICD-10-CM

## 2021-02-08 LAB
ALBUMIN SERPL BCP-MCNC: 3 G/DL (ref 3.5–5.2)
ALP SERPL-CCNC: 224 U/L (ref 55–135)
ALT SERPL W/O P-5'-P-CCNC: 31 U/L (ref 10–44)
AST SERPL-CCNC: 30 U/L (ref 10–40)
BILIRUB DIRECT SERPL-MCNC: 0.3 MG/DL (ref 0.1–0.3)
BILIRUB SERPL-MCNC: 0.6 MG/DL (ref 0.1–1)
INR PPP: 2.6 (ref 0.8–1.2)
PROT SERPL-MCNC: 9.3 G/DL (ref 6–8.4)
PROTHROMBIN TIME: 26.6 SEC (ref 9–12.5)

## 2021-02-08 PROCEDURE — 36415 COLL VENOUS BLD VENIPUNCTURE: CPT | Mod: HCNC

## 2021-02-08 PROCEDURE — 80076 HEPATIC FUNCTION PANEL: CPT | Mod: HCNC

## 2021-02-08 PROCEDURE — 85610 PROTHROMBIN TIME: CPT | Mod: HCNC

## 2021-02-09 ENCOUNTER — ANTI-COAG VISIT (OUTPATIENT)
Dept: CARDIOLOGY | Facility: CLINIC | Age: 46
End: 2021-02-09
Payer: MEDICARE

## 2021-02-09 DIAGNOSIS — Z79.01 LONG TERM CURRENT USE OF ANTICOAGULANT THERAPY: ICD-10-CM

## 2021-02-09 DIAGNOSIS — I27.9 CHRONIC PULMONARY HEART DISEASE: Primary | ICD-10-CM

## 2021-02-09 PROCEDURE — 93793 ANTICOAG MGMT PT WARFARIN: CPT | Mod: S$GLB,,, | Performed by: PHARMACIST

## 2021-02-09 PROCEDURE — 93793 PR ANTICOAGULANT MGMT FOR PT TAKING WARFARIN: ICD-10-PCS | Mod: S$GLB,,, | Performed by: PHARMACIST

## 2021-02-22 ENCOUNTER — LAB VISIT (OUTPATIENT)
Dept: LAB | Facility: HOSPITAL | Age: 46
End: 2021-02-22
Payer: MEDICARE

## 2021-02-22 DIAGNOSIS — I27.9 CHRONIC PULMONARY HEART DISEASE: ICD-10-CM

## 2021-02-22 DIAGNOSIS — Z79.01 LONG TERM CURRENT USE OF ANTICOAGULANT THERAPY: ICD-10-CM

## 2021-02-22 LAB
INR PPP: 4.1 (ref 0.8–1.2)
PROTHROMBIN TIME: 40.8 SEC (ref 9–12.5)

## 2021-02-22 PROCEDURE — 36415 COLL VENOUS BLD VENIPUNCTURE: CPT | Mod: HCNC

## 2021-02-22 PROCEDURE — 85610 PROTHROMBIN TIME: CPT | Mod: HCNC

## 2021-02-23 ENCOUNTER — ANTI-COAG VISIT (OUTPATIENT)
Dept: CARDIOLOGY | Facility: CLINIC | Age: 46
End: 2021-02-23
Payer: MEDICARE

## 2021-02-23 DIAGNOSIS — I27.9 CHRONIC PULMONARY HEART DISEASE: Primary | ICD-10-CM

## 2021-02-23 DIAGNOSIS — Z79.01 LONG TERM CURRENT USE OF ANTICOAGULANT THERAPY: ICD-10-CM

## 2021-02-23 PROCEDURE — 93793 PR ANTICOAGULANT MGMT FOR PT TAKING WARFARIN: ICD-10-PCS | Mod: S$GLB,,, | Performed by: PHARMACIST

## 2021-02-23 PROCEDURE — 93793 ANTICOAG MGMT PT WARFARIN: CPT | Mod: S$GLB,,, | Performed by: PHARMACIST

## 2021-02-25 ENCOUNTER — PES CALL (OUTPATIENT)
Dept: ADMINISTRATIVE | Facility: CLINIC | Age: 46
End: 2021-02-25

## 2021-03-03 DIAGNOSIS — I27.20 PULMONARY HYPERTENSION: Primary | ICD-10-CM

## 2021-03-03 RX ORDER — SILDENAFIL CITRATE 20 MG/1
20 TABLET ORAL 3 TIMES DAILY
Qty: 270 TABLET | Status: SHIPPED | OUTPATIENT
Start: 2021-03-03 | End: 2021-03-07 | Stop reason: SDUPTHER

## 2021-03-07 DIAGNOSIS — I27.20 PULMONARY HYPERTENSION: ICD-10-CM

## 2021-03-07 DIAGNOSIS — I48.0 PAROXYSMAL ATRIAL FIBRILLATION: ICD-10-CM

## 2021-03-07 DIAGNOSIS — I27.9 CHRONIC CARDIOPULMONARY DISEASE: ICD-10-CM

## 2021-03-07 DIAGNOSIS — I27.9 CHRONIC PULMONARY HEART DISEASE: ICD-10-CM

## 2021-03-07 DIAGNOSIS — I27.81 COR PULMONALE: ICD-10-CM

## 2021-03-07 DIAGNOSIS — I27.89 OTHER SPECIFIED PULMONARY HEART DISEASES: ICD-10-CM

## 2021-03-07 DIAGNOSIS — E66.2 PICKWICKIAN SYNDROME: ICD-10-CM

## 2021-03-07 DIAGNOSIS — Z79.01 LONG TERM CURRENT USE OF ANTICOAGULANT THERAPY: ICD-10-CM

## 2021-03-07 DIAGNOSIS — Q21.12 PFO (PATENT FORAMEN OVALE): ICD-10-CM

## 2021-03-07 DIAGNOSIS — E05.90 HYPERTHYROIDISM: ICD-10-CM

## 2021-03-07 DIAGNOSIS — I48.91 ATRIAL FIBRILLATION, UNSPECIFIED TYPE: ICD-10-CM

## 2021-03-07 DIAGNOSIS — I27.0 PRIMARY PULMONARY HYPERTENSION: ICD-10-CM

## 2021-03-07 DIAGNOSIS — E66.01 MORBID OBESITY: ICD-10-CM

## 2021-03-07 DIAGNOSIS — G47.33 OSA (OBSTRUCTIVE SLEEP APNEA): ICD-10-CM

## 2021-03-07 RX ORDER — LANOLIN ALCOHOL/MO/W.PET/CERES
400 CREAM (GRAM) TOPICAL 2 TIMES DAILY
Qty: 60 TABLET | Refills: 11 | Status: ON HOLD | COMMUNITY
Start: 2021-03-07 | End: 2022-03-23 | Stop reason: HOSPADM

## 2021-03-07 RX ORDER — FUROSEMIDE 80 MG/1
80 TABLET ORAL 2 TIMES DAILY
Qty: 180 TABLET | Refills: 3 | Status: SHIPPED | OUTPATIENT
Start: 2021-03-07 | End: 2021-03-07 | Stop reason: SDUPTHER

## 2021-03-07 RX ORDER — POTASSIUM CHLORIDE 750 MG/1
10 CAPSULE, EXTENDED RELEASE ORAL DAILY
Qty: 30 CAPSULE | Refills: 11 | Status: SHIPPED | OUTPATIENT
Start: 2021-03-07 | End: 2021-06-06

## 2021-03-07 RX ORDER — BOSENTAN 125 MG/1
125 TABLET, FILM COATED ORAL 2 TIMES DAILY
Qty: 60 TABLET | Refills: 4 | Status: SHIPPED | OUTPATIENT
Start: 2021-03-07 | End: 2021-09-20

## 2021-03-07 RX ORDER — METOPROLOL TARTRATE 25 MG/1
25 TABLET, FILM COATED ORAL 2 TIMES DAILY
Qty: 180 TABLET | Refills: 3 | Status: SHIPPED | OUTPATIENT
Start: 2021-03-07 | End: 2021-11-03

## 2021-03-07 RX ORDER — ALBUTEROL SULFATE 90 UG/1
2 AEROSOL, METERED RESPIRATORY (INHALATION) EVERY 4 HOURS PRN
Qty: 18 G | Refills: 2 | Status: SHIPPED | OUTPATIENT
Start: 2021-03-07

## 2021-03-07 RX ORDER — FUROSEMIDE 80 MG/1
80 TABLET ORAL 2 TIMES DAILY
Qty: 180 TABLET | Refills: 3 | Status: SHIPPED | OUTPATIENT
Start: 2021-03-07 | End: 2022-03-12 | Stop reason: SDUPTHER

## 2021-03-07 RX ORDER — FLUTICASONE PROPIONATE AND SALMETEROL 50; 250 UG/1; UG/1
1 POWDER RESPIRATORY (INHALATION) 2 TIMES DAILY
Qty: 60 EACH | Refills: 3 | Status: SHIPPED | OUTPATIENT
Start: 2021-03-07 | End: 2022-06-01

## 2021-03-07 RX ORDER — ALLOPURINOL 300 MG/1
300 TABLET ORAL DAILY
Qty: 90 TABLET | Refills: 3 | Status: SHIPPED | OUTPATIENT
Start: 2021-03-07 | End: 2022-06-01

## 2021-03-07 RX ORDER — SILDENAFIL CITRATE 20 MG/1
20 TABLET ORAL 3 TIMES DAILY
Qty: 270 TABLET | Status: SHIPPED | OUTPATIENT
Start: 2021-03-07 | End: 2021-03-19 | Stop reason: SDUPTHER

## 2021-03-07 RX ORDER — METOLAZONE 2.5 MG/1
2.5 TABLET ORAL
Qty: 30 TABLET | Status: SHIPPED | OUTPATIENT
Start: 2021-03-08 | End: 2022-03-29

## 2021-03-08 ENCOUNTER — LAB VISIT (OUTPATIENT)
Dept: LAB | Facility: HOSPITAL | Age: 46
End: 2021-03-08
Payer: MEDICARE

## 2021-03-08 DIAGNOSIS — Z79.01 LONG TERM CURRENT USE OF ANTICOAGULANT THERAPY: ICD-10-CM

## 2021-03-08 DIAGNOSIS — I27.20 PULMONARY HYPERTENSION: ICD-10-CM

## 2021-03-08 DIAGNOSIS — I27.9 CHRONIC PULMONARY HEART DISEASE: ICD-10-CM

## 2021-03-08 LAB
ALBUMIN SERPL BCP-MCNC: 2.8 G/DL (ref 3.5–5.2)
ALP SERPL-CCNC: 162 U/L (ref 55–135)
ALT SERPL W/O P-5'-P-CCNC: 24 U/L (ref 10–44)
AST SERPL-CCNC: 26 U/L (ref 10–40)
BILIRUB DIRECT SERPL-MCNC: 0.2 MG/DL (ref 0.1–0.3)
BILIRUB SERPL-MCNC: 0.5 MG/DL (ref 0.1–1)
INR PPP: 1.8 (ref 0.8–1.2)
PROT SERPL-MCNC: 8.6 G/DL (ref 6–8.4)
PROTHROMBIN TIME: 19.3 SEC (ref 9–12.5)

## 2021-03-08 PROCEDURE — 36415 COLL VENOUS BLD VENIPUNCTURE: CPT | Performed by: INTERNAL MEDICINE

## 2021-03-08 PROCEDURE — 80076 HEPATIC FUNCTION PANEL: CPT | Performed by: STUDENT IN AN ORGANIZED HEALTH CARE EDUCATION/TRAINING PROGRAM

## 2021-03-08 PROCEDURE — 85610 PROTHROMBIN TIME: CPT | Performed by: INTERNAL MEDICINE

## 2021-03-09 ENCOUNTER — IMMUNIZATION (OUTPATIENT)
Dept: PRIMARY CARE CLINIC | Facility: CLINIC | Age: 46
End: 2021-03-09
Payer: MEDICARE

## 2021-03-09 ENCOUNTER — ANTI-COAG VISIT (OUTPATIENT)
Dept: CARDIOLOGY | Facility: CLINIC | Age: 46
End: 2021-03-09
Payer: MEDICARE

## 2021-03-09 DIAGNOSIS — Z23 NEED FOR VACCINATION: Primary | ICD-10-CM

## 2021-03-09 DIAGNOSIS — Z79.01 LONG TERM CURRENT USE OF ANTICOAGULANT THERAPY: ICD-10-CM

## 2021-03-09 DIAGNOSIS — I27.9 CHRONIC PULMONARY HEART DISEASE: Primary | ICD-10-CM

## 2021-03-09 PROCEDURE — 93793 ANTICOAG MGMT PT WARFARIN: CPT | Mod: S$GLB,,,

## 2021-03-09 PROCEDURE — 0001A COVID-19, MRNA, LNP-S, PF, 30 MCG/0.3 ML DOSE VACCINE: ICD-10-PCS | Mod: CV19,S$GLB,, | Performed by: FAMILY MEDICINE

## 2021-03-09 PROCEDURE — 93793 PR ANTICOAGULANT MGMT FOR PT TAKING WARFARIN: ICD-10-PCS | Mod: S$GLB,,,

## 2021-03-09 PROCEDURE — 91300 COVID-19, MRNA, LNP-S, PF, 30 MCG/0.3 ML DOSE VACCINE: ICD-10-PCS | Mod: S$GLB,,, | Performed by: FAMILY MEDICINE

## 2021-03-09 PROCEDURE — 0001A COVID-19, MRNA, LNP-S, PF, 30 MCG/0.3 ML DOSE VACCINE: CPT | Mod: CV19,S$GLB,, | Performed by: FAMILY MEDICINE

## 2021-03-09 PROCEDURE — 91300 COVID-19, MRNA, LNP-S, PF, 30 MCG/0.3 ML DOSE VACCINE: CPT | Mod: S$GLB,,, | Performed by: FAMILY MEDICINE

## 2021-03-10 ENCOUNTER — PATIENT OUTREACH (OUTPATIENT)
Dept: ADMINISTRATIVE | Facility: HOSPITAL | Age: 46
End: 2021-03-10

## 2021-03-16 ENCOUNTER — OFFICE VISIT (OUTPATIENT)
Dept: INTERNAL MEDICINE | Facility: CLINIC | Age: 46
End: 2021-03-16
Payer: MEDICARE

## 2021-03-16 ENCOUNTER — LAB VISIT (OUTPATIENT)
Dept: LAB | Facility: HOSPITAL | Age: 46
End: 2021-03-16
Attending: INTERNAL MEDICINE
Payer: MEDICARE

## 2021-03-16 VITALS
DIASTOLIC BLOOD PRESSURE: 70 MMHG | SYSTOLIC BLOOD PRESSURE: 100 MMHG | OXYGEN SATURATION: 98 % | BODY MASS INDEX: 52.48 KG/M2 | HEIGHT: 65 IN | HEART RATE: 68 BPM | WEIGHT: 315 LBS

## 2021-03-16 DIAGNOSIS — I27.20 PULMONARY HYPERTENSION: ICD-10-CM

## 2021-03-16 DIAGNOSIS — N62 GYNECOMASTIA: ICD-10-CM

## 2021-03-16 DIAGNOSIS — I50.42 CHRONIC COMBINED SYSTOLIC AND DIASTOLIC CONGESTIVE HEART FAILURE: ICD-10-CM

## 2021-03-16 DIAGNOSIS — E88.09 HYPOALBUMINEMIA: ICD-10-CM

## 2021-03-16 DIAGNOSIS — I27.9 CHRONIC PULMONARY HEART DISEASE: ICD-10-CM

## 2021-03-16 DIAGNOSIS — Z00.00 ANNUAL PHYSICAL EXAM: Primary | ICD-10-CM

## 2021-03-16 DIAGNOSIS — Z00.00 ANNUAL PHYSICAL EXAM: ICD-10-CM

## 2021-03-16 DIAGNOSIS — Q21.12 PFO (PATENT FORAMEN OVALE): ICD-10-CM

## 2021-03-16 DIAGNOSIS — R74.8 ALKALINE PHOSPHATASE ELEVATION: ICD-10-CM

## 2021-03-16 DIAGNOSIS — Z79.01 LONG TERM CURRENT USE OF ANTICOAGULANT THERAPY: ICD-10-CM

## 2021-03-16 DIAGNOSIS — M10.9 GOUT, UNSPECIFIED CAUSE, UNSPECIFIED CHRONICITY, UNSPECIFIED SITE: ICD-10-CM

## 2021-03-16 LAB
BASOPHILS # BLD AUTO: 0.04 K/UL (ref 0–0.2)
BASOPHILS NFR BLD: 0.5 % (ref 0–1.9)
DIFFERENTIAL METHOD: ABNORMAL
EOSINOPHIL # BLD AUTO: 0.3 K/UL (ref 0–0.5)
EOSINOPHIL NFR BLD: 3.3 % (ref 0–8)
ERYTHROCYTE [DISTWIDTH] IN BLOOD BY AUTOMATED COUNT: 16.8 % (ref 11.5–14.5)
HCT VFR BLD AUTO: 55.5 % (ref 40–54)
HGB BLD-MCNC: 16.3 G/DL (ref 14–18)
IMM GRANULOCYTES # BLD AUTO: 0.01 K/UL (ref 0–0.04)
IMM GRANULOCYTES NFR BLD AUTO: 0.1 % (ref 0–0.5)
INR PPP: 2.7 (ref 0.8–1.2)
LYMPHOCYTES # BLD AUTO: 1.3 K/UL (ref 1–4.8)
LYMPHOCYTES NFR BLD: 16.7 % (ref 18–48)
MCH RBC QN AUTO: 26.8 PG (ref 27–31)
MCHC RBC AUTO-ENTMCNC: 29.4 G/DL (ref 32–36)
MCV RBC AUTO: 91 FL (ref 82–98)
MONOCYTES # BLD AUTO: 0.6 K/UL (ref 0.3–1)
MONOCYTES NFR BLD: 7.6 % (ref 4–15)
NEUTROPHILS # BLD AUTO: 5.5 K/UL (ref 1.8–7.7)
NEUTROPHILS NFR BLD: 71.8 % (ref 38–73)
NRBC BLD-RTO: 0 /100 WBC
PLATELET # BLD AUTO: 261 K/UL (ref 150–350)
PMV BLD AUTO: 10.6 FL (ref 9.2–12.9)
PROTHROMBIN TIME: 28.1 SEC (ref 9–12.5)
RBC # BLD AUTO: 6.08 M/UL (ref 4.6–6.2)
WBC # BLD AUTO: 7.6 K/UL (ref 3.9–12.7)

## 2021-03-16 PROCEDURE — 84165 PATHOLOGIST INTERPRETATION SPE: ICD-10-PCS | Mod: 26,,, | Performed by: PATHOLOGY

## 2021-03-16 PROCEDURE — 1126F PR PAIN SEVERITY QUANTIFIED, NO PAIN PRESENT: ICD-10-PCS | Mod: S$GLB,,, | Performed by: INTERNAL MEDICINE

## 2021-03-16 PROCEDURE — 99396 PREV VISIT EST AGE 40-64: CPT | Mod: S$GLB,,, | Performed by: INTERNAL MEDICINE

## 2021-03-16 PROCEDURE — 36415 COLL VENOUS BLD VENIPUNCTURE: CPT | Performed by: INTERNAL MEDICINE

## 2021-03-16 PROCEDURE — 80048 BASIC METABOLIC PNL TOTAL CA: CPT | Performed by: INTERNAL MEDICINE

## 2021-03-16 PROCEDURE — 80061 LIPID PANEL: CPT | Performed by: INTERNAL MEDICINE

## 2021-03-16 PROCEDURE — 84153 ASSAY OF PSA TOTAL: CPT | Mod: GA | Performed by: INTERNAL MEDICINE

## 2021-03-16 PROCEDURE — 3008F PR BODY MASS INDEX (BMI) DOCUMENTED: ICD-10-PCS | Mod: CPTII,S$GLB,, | Performed by: INTERNAL MEDICINE

## 2021-03-16 PROCEDURE — 99396 PR PREVENTIVE VISIT,EST,40-64: ICD-10-PCS | Mod: S$GLB,,, | Performed by: INTERNAL MEDICINE

## 2021-03-16 PROCEDURE — 85025 COMPLETE CBC W/AUTO DIFF WBC: CPT | Performed by: INTERNAL MEDICINE

## 2021-03-16 PROCEDURE — 84443 ASSAY THYROID STIM HORMONE: CPT | Performed by: INTERNAL MEDICINE

## 2021-03-16 PROCEDURE — 84439 ASSAY OF FREE THYROXINE: CPT | Performed by: INTERNAL MEDICINE

## 2021-03-16 PROCEDURE — 82306 VITAMIN D 25 HYDROXY: CPT | Mod: GA | Performed by: INTERNAL MEDICINE

## 2021-03-16 PROCEDURE — 3074F PR MOST RECENT SYSTOLIC BLOOD PRESSURE < 130 MM HG: ICD-10-PCS | Mod: CPTII,S$GLB,, | Performed by: INTERNAL MEDICINE

## 2021-03-16 PROCEDURE — 85610 PROTHROMBIN TIME: CPT | Performed by: INTERNAL MEDICINE

## 2021-03-16 PROCEDURE — 84165 PROTEIN E-PHORESIS SERUM: CPT | Mod: 26,,, | Performed by: PATHOLOGY

## 2021-03-16 PROCEDURE — 1126F AMNT PAIN NOTED NONE PRSNT: CPT | Mod: S$GLB,,, | Performed by: INTERNAL MEDICINE

## 2021-03-16 PROCEDURE — 84403 ASSAY OF TOTAL TESTOSTERONE: CPT | Performed by: INTERNAL MEDICINE

## 2021-03-16 PROCEDURE — 84550 ASSAY OF BLOOD/URIC ACID: CPT | Performed by: INTERNAL MEDICINE

## 2021-03-16 PROCEDURE — 3074F SYST BP LT 130 MM HG: CPT | Mod: CPTII,S$GLB,, | Performed by: INTERNAL MEDICINE

## 2021-03-16 PROCEDURE — 99499 UNLISTED E&M SERVICE: CPT | Mod: S$GLB,,, | Performed by: INTERNAL MEDICINE

## 2021-03-16 PROCEDURE — 99499 RISK ADDL DX/OHS AUDIT: ICD-10-PCS | Mod: S$GLB,,, | Performed by: INTERNAL MEDICINE

## 2021-03-16 PROCEDURE — 99999 PR PBB SHADOW E&M-EST. PATIENT-LVL III: CPT | Mod: PBBFAC,,, | Performed by: INTERNAL MEDICINE

## 2021-03-16 PROCEDURE — 3078F PR MOST RECENT DIASTOLIC BLOOD PRESSURE < 80 MM HG: ICD-10-PCS | Mod: CPTII,S$GLB,, | Performed by: INTERNAL MEDICINE

## 2021-03-16 PROCEDURE — 84165 PROTEIN E-PHORESIS SERUM: CPT | Performed by: INTERNAL MEDICINE

## 2021-03-16 PROCEDURE — 3008F BODY MASS INDEX DOCD: CPT | Mod: CPTII,S$GLB,, | Performed by: INTERNAL MEDICINE

## 2021-03-16 PROCEDURE — 3078F DIAST BP <80 MM HG: CPT | Mod: CPTII,S$GLB,, | Performed by: INTERNAL MEDICINE

## 2021-03-16 PROCEDURE — 99999 PR PBB SHADOW E&M-EST. PATIENT-LVL III: ICD-10-PCS | Mod: PBBFAC,,, | Performed by: INTERNAL MEDICINE

## 2021-03-17 ENCOUNTER — PATIENT MESSAGE (OUTPATIENT)
Dept: INTERNAL MEDICINE | Facility: CLINIC | Age: 46
End: 2021-03-17

## 2021-03-17 ENCOUNTER — ANTI-COAG VISIT (OUTPATIENT)
Dept: CARDIOLOGY | Facility: CLINIC | Age: 46
End: 2021-03-17
Payer: MEDICARE

## 2021-03-17 DIAGNOSIS — E87.6 HYPOKALEMIA: ICD-10-CM

## 2021-03-17 DIAGNOSIS — I50.42 CHRONIC COMBINED SYSTOLIC AND DIASTOLIC CONGESTIVE HEART FAILURE: Primary | ICD-10-CM

## 2021-03-17 DIAGNOSIS — Z79.01 LONG TERM CURRENT USE OF ANTICOAGULANT THERAPY: ICD-10-CM

## 2021-03-17 DIAGNOSIS — I27.9 CHRONIC PULMONARY HEART DISEASE: Primary | ICD-10-CM

## 2021-03-17 LAB
25(OH)D3+25(OH)D2 SERPL-MCNC: 35 NG/ML (ref 30–96)
ALBUMIN SERPL ELPH-MCNC: 3.57 G/DL (ref 3.35–5.55)
ALPHA1 GLOB SERPL ELPH-MCNC: 0.53 G/DL (ref 0.17–0.41)
ALPHA2 GLOB SERPL ELPH-MCNC: 0.86 G/DL (ref 0.43–0.99)
ANION GAP SERPL CALC-SCNC: 7 MMOL/L (ref 8–16)
B-GLOBULIN SERPL ELPH-MCNC: 1.27 G/DL (ref 0.5–1.1)
BUN SERPL-MCNC: 46 MG/DL (ref 6–20)
CALCIUM SERPL-MCNC: 9.2 MG/DL (ref 8.7–10.5)
CHLORIDE SERPL-SCNC: 89 MMOL/L (ref 95–110)
CHOLEST SERPL-MCNC: 193 MG/DL (ref 120–199)
CHOLEST/HDLC SERPL: 3.4 {RATIO} (ref 2–5)
CO2 SERPL-SCNC: 39 MMOL/L (ref 23–29)
COMPLEXED PSA SERPL-MCNC: 0.68 NG/ML (ref 0–4)
CREAT SERPL-MCNC: 1.6 MG/DL (ref 0.5–1.4)
EST. GFR  (AFRICAN AMERICAN): 59.3 ML/MIN/1.73 M^2
EST. GFR  (NON AFRICAN AMERICAN): 51.3 ML/MIN/1.73 M^2
GAMMA GLOB SERPL ELPH-MCNC: 2.87 G/DL (ref 0.67–1.58)
GLUCOSE SERPL-MCNC: 96 MG/DL (ref 70–110)
HDLC SERPL-MCNC: 56 MG/DL (ref 40–75)
HDLC SERPL: 29 % (ref 20–50)
LDLC SERPL CALC-MCNC: 109.4 MG/DL (ref 63–159)
NONHDLC SERPL-MCNC: 137 MG/DL
POTASSIUM SERPL-SCNC: 3.1 MMOL/L (ref 3.5–5.1)
PROT SERPL-MCNC: 9.1 G/DL (ref 6–8.4)
SODIUM SERPL-SCNC: 135 MMOL/L (ref 136–145)
T4 FREE SERPL-MCNC: 1.15 NG/DL (ref 0.71–1.51)
TESTOST SERPL-MCNC: 504 NG/DL (ref 304–1227)
TRIGL SERPL-MCNC: 138 MG/DL (ref 30–150)
TSH SERPL DL<=0.005 MIU/L-ACNC: 2.77 UIU/ML (ref 0.4–4)
URATE SERPL-MCNC: 7.2 MG/DL (ref 3.4–7)

## 2021-03-17 PROCEDURE — 93793 PR ANTICOAGULANT MGMT FOR PT TAKING WARFARIN: ICD-10-PCS | Mod: S$GLB,,, | Performed by: PHARMACIST

## 2021-03-17 PROCEDURE — 93793 ANTICOAG MGMT PT WARFARIN: CPT | Mod: S$GLB,,, | Performed by: PHARMACIST

## 2021-03-18 LAB — PATHOLOGIST INTERPRETATION SPE: NORMAL

## 2021-03-19 DIAGNOSIS — I27.20 PULMONARY HYPERTENSION: ICD-10-CM

## 2021-03-19 RX ORDER — SILDENAFIL CITRATE 20 MG/1
20 TABLET ORAL 3 TIMES DAILY
Qty: 270 TABLET | Status: SHIPPED | OUTPATIENT
Start: 2021-03-19 | End: 2022-03-23

## 2021-03-22 ENCOUNTER — LAB VISIT (OUTPATIENT)
Dept: LAB | Facility: HOSPITAL | Age: 46
End: 2021-03-22
Payer: MEDICARE

## 2021-03-22 DIAGNOSIS — Z79.01 LONG TERM CURRENT USE OF ANTICOAGULANT THERAPY: ICD-10-CM

## 2021-03-22 DIAGNOSIS — I27.9 CHRONIC PULMONARY HEART DISEASE: ICD-10-CM

## 2021-03-22 LAB
INR PPP: 2.7 (ref 0.8–1.2)
PROTHROMBIN TIME: 27.7 SEC (ref 9–12.5)

## 2021-03-22 PROCEDURE — 85610 PROTHROMBIN TIME: CPT | Performed by: INTERNAL MEDICINE

## 2021-03-22 PROCEDURE — 36415 COLL VENOUS BLD VENIPUNCTURE: CPT | Performed by: INTERNAL MEDICINE

## 2021-03-23 ENCOUNTER — ANTI-COAG VISIT (OUTPATIENT)
Dept: CARDIOLOGY | Facility: CLINIC | Age: 46
End: 2021-03-23
Payer: MEDICARE

## 2021-03-23 DIAGNOSIS — I27.9 CHRONIC PULMONARY HEART DISEASE: Primary | ICD-10-CM

## 2021-03-23 DIAGNOSIS — Z79.01 LONG TERM CURRENT USE OF ANTICOAGULANT THERAPY: ICD-10-CM

## 2021-03-23 PROCEDURE — 93793 ANTICOAG MGMT PT WARFARIN: CPT | Mod: S$GLB,,, | Performed by: PHARMACIST

## 2021-03-23 PROCEDURE — 93793 PR ANTICOAGULANT MGMT FOR PT TAKING WARFARIN: ICD-10-PCS | Mod: S$GLB,,, | Performed by: PHARMACIST

## 2021-03-25 ENCOUNTER — TELEPHONE (OUTPATIENT)
Dept: INTERNAL MEDICINE | Facility: CLINIC | Age: 46
End: 2021-03-25

## 2021-03-26 ENCOUNTER — OFFICE VISIT (OUTPATIENT)
Dept: INTERNAL MEDICINE | Facility: CLINIC | Age: 46
End: 2021-03-26
Payer: MEDICARE

## 2021-03-26 ENCOUNTER — TELEPHONE (OUTPATIENT)
Dept: INTERNAL MEDICINE | Facility: CLINIC | Age: 46
End: 2021-03-26

## 2021-03-26 VITALS
BODY MASS INDEX: 52.48 KG/M2 | OXYGEN SATURATION: 94 % | HEIGHT: 65 IN | DIASTOLIC BLOOD PRESSURE: 68 MMHG | SYSTOLIC BLOOD PRESSURE: 106 MMHG | HEART RATE: 83 BPM | WEIGHT: 315 LBS

## 2021-03-26 DIAGNOSIS — Z99.81 OXYGEN DEPENDENT: ICD-10-CM

## 2021-03-26 DIAGNOSIS — I27.20 PULMONARY HYPERTENSION: ICD-10-CM

## 2021-03-26 DIAGNOSIS — I10 ESSENTIAL HYPERTENSION: ICD-10-CM

## 2021-03-26 DIAGNOSIS — Z00.00 ENCOUNTER FOR PREVENTIVE HEALTH EXAMINATION: Primary | ICD-10-CM

## 2021-03-26 DIAGNOSIS — N18.30 STAGE 3 CHRONIC KIDNEY DISEASE, UNSPECIFIED WHETHER STAGE 3A OR 3B CKD: ICD-10-CM

## 2021-03-26 DIAGNOSIS — I27.9 CHRONIC PULMONARY HEART DISEASE: ICD-10-CM

## 2021-03-26 DIAGNOSIS — Z99.81 DEPENDENCE ON SUPPLEMENTAL OXYGEN: ICD-10-CM

## 2021-03-26 DIAGNOSIS — I48.0 PAROXYSMAL ATRIAL FIBRILLATION: ICD-10-CM

## 2021-03-26 DIAGNOSIS — R26.9 ABNORMALITY OF GAIT AND MOBILITY: ICD-10-CM

## 2021-03-26 PROCEDURE — 1126F AMNT PAIN NOTED NONE PRSNT: CPT | Mod: S$GLB,,, | Performed by: NURSE PRACTITIONER

## 2021-03-26 PROCEDURE — 99999 PR PBB SHADOW E&M-EST. PATIENT-LVL IV: CPT | Mod: PBBFAC,,, | Performed by: NURSE PRACTITIONER

## 2021-03-26 PROCEDURE — 99499 RISK ADDL DX/OHS AUDIT: ICD-10-PCS | Mod: S$GLB,,, | Performed by: NURSE PRACTITIONER

## 2021-03-26 PROCEDURE — 3078F PR MOST RECENT DIASTOLIC BLOOD PRESSURE < 80 MM HG: ICD-10-PCS | Mod: CPTII,S$GLB,, | Performed by: NURSE PRACTITIONER

## 2021-03-26 PROCEDURE — 3074F PR MOST RECENT SYSTOLIC BLOOD PRESSURE < 130 MM HG: ICD-10-PCS | Mod: CPTII,S$GLB,, | Performed by: NURSE PRACTITIONER

## 2021-03-26 PROCEDURE — 3078F DIAST BP <80 MM HG: CPT | Mod: CPTII,S$GLB,, | Performed by: NURSE PRACTITIONER

## 2021-03-26 PROCEDURE — 99499 UNLISTED E&M SERVICE: CPT | Mod: S$GLB,,, | Performed by: NURSE PRACTITIONER

## 2021-03-26 PROCEDURE — 3008F BODY MASS INDEX DOCD: CPT | Mod: CPTII,S$GLB,, | Performed by: NURSE PRACTITIONER

## 2021-03-26 PROCEDURE — 3074F SYST BP LT 130 MM HG: CPT | Mod: CPTII,S$GLB,, | Performed by: NURSE PRACTITIONER

## 2021-03-26 PROCEDURE — G0439 PR MEDICARE ANNUAL WELLNESS SUBSEQUENT VISIT: ICD-10-PCS | Mod: S$GLB,,, | Performed by: NURSE PRACTITIONER

## 2021-03-26 PROCEDURE — 1126F PR PAIN SEVERITY QUANTIFIED, NO PAIN PRESENT: ICD-10-PCS | Mod: S$GLB,,, | Performed by: NURSE PRACTITIONER

## 2021-03-26 PROCEDURE — 3008F PR BODY MASS INDEX (BMI) DOCUMENTED: ICD-10-PCS | Mod: CPTII,S$GLB,, | Performed by: NURSE PRACTITIONER

## 2021-03-26 PROCEDURE — 99999 PR PBB SHADOW E&M-EST. PATIENT-LVL IV: ICD-10-PCS | Mod: PBBFAC,,, | Performed by: NURSE PRACTITIONER

## 2021-03-26 PROCEDURE — G0439 PPPS, SUBSEQ VISIT: HCPCS | Mod: S$GLB,,, | Performed by: NURSE PRACTITIONER

## 2021-03-30 ENCOUNTER — IMMUNIZATION (OUTPATIENT)
Dept: PRIMARY CARE CLINIC | Facility: CLINIC | Age: 46
End: 2021-03-30
Payer: MEDICARE

## 2021-03-30 ENCOUNTER — TELEPHONE (OUTPATIENT)
Dept: INTERNAL MEDICINE | Facility: CLINIC | Age: 46
End: 2021-03-30

## 2021-03-30 DIAGNOSIS — Z23 NEED FOR VACCINATION: Primary | ICD-10-CM

## 2021-03-30 DIAGNOSIS — I27.9 CHRONIC PULMONARY HEART DISEASE: Primary | ICD-10-CM

## 2021-03-30 DIAGNOSIS — I27.20 PULMONARY HYPERTENSION: ICD-10-CM

## 2021-03-30 PROCEDURE — 0002A COVID-19, MRNA, LNP-S, PF, 30 MCG/0.3 ML DOSE VACCINE: CPT | Mod: CV19,S$GLB,, | Performed by: FAMILY MEDICINE

## 2021-03-30 PROCEDURE — 91300 COVID-19, MRNA, LNP-S, PF, 30 MCG/0.3 ML DOSE VACCINE: CPT | Mod: S$GLB,,, | Performed by: FAMILY MEDICINE

## 2021-03-30 PROCEDURE — 0002A COVID-19, MRNA, LNP-S, PF, 30 MCG/0.3 ML DOSE VACCINE: ICD-10-PCS | Mod: CV19,S$GLB,, | Performed by: FAMILY MEDICINE

## 2021-03-30 PROCEDURE — 91300 COVID-19, MRNA, LNP-S, PF, 30 MCG/0.3 ML DOSE VACCINE: ICD-10-PCS | Mod: S$GLB,,, | Performed by: FAMILY MEDICINE

## 2021-04-01 ENCOUNTER — LAB VISIT (OUTPATIENT)
Dept: LAB | Facility: HOSPITAL | Age: 46
End: 2021-04-01
Attending: INTERNAL MEDICINE
Payer: MEDICARE

## 2021-04-01 DIAGNOSIS — I50.42 CHRONIC COMBINED SYSTOLIC AND DIASTOLIC CONGESTIVE HEART FAILURE: ICD-10-CM

## 2021-04-01 DIAGNOSIS — E87.6 HYPOKALEMIA: ICD-10-CM

## 2021-04-01 LAB
ANION GAP SERPL CALC-SCNC: 8 MMOL/L (ref 8–16)
BASOPHILS # BLD AUTO: 0.04 K/UL (ref 0–0.2)
BASOPHILS NFR BLD: 0.5 % (ref 0–1.9)
BUN SERPL-MCNC: 30 MG/DL (ref 6–20)
CALCIUM SERPL-MCNC: 9 MG/DL (ref 8.7–10.5)
CHLORIDE SERPL-SCNC: 92 MMOL/L (ref 95–110)
CO2 SERPL-SCNC: 39 MMOL/L (ref 23–29)
CREAT SERPL-MCNC: 1.4 MG/DL (ref 0.5–1.4)
DIFFERENTIAL METHOD: ABNORMAL
EOSINOPHIL # BLD AUTO: 0.4 K/UL (ref 0–0.5)
EOSINOPHIL NFR BLD: 5.1 % (ref 0–8)
ERYTHROCYTE [DISTWIDTH] IN BLOOD BY AUTOMATED COUNT: 16 % (ref 11.5–14.5)
EST. GFR  (AFRICAN AMERICAN): >60 ML/MIN/1.73 M^2
EST. GFR  (NON AFRICAN AMERICAN): >60 ML/MIN/1.73 M^2
GLUCOSE SERPL-MCNC: 101 MG/DL (ref 70–110)
HCT VFR BLD AUTO: 50.2 % (ref 40–54)
HGB BLD-MCNC: 14.8 G/DL (ref 14–18)
IMM GRANULOCYTES # BLD AUTO: 0.02 K/UL (ref 0–0.04)
IMM GRANULOCYTES NFR BLD AUTO: 0.3 % (ref 0–0.5)
LYMPHOCYTES # BLD AUTO: 1.2 K/UL (ref 1–4.8)
LYMPHOCYTES NFR BLD: 16.1 % (ref 18–48)
MCH RBC QN AUTO: 27.3 PG (ref 27–31)
MCHC RBC AUTO-ENTMCNC: 29.5 G/DL (ref 32–36)
MCV RBC AUTO: 92 FL (ref 82–98)
MONOCYTES # BLD AUTO: 0.7 K/UL (ref 0.3–1)
MONOCYTES NFR BLD: 9.6 % (ref 4–15)
NEUTROPHILS # BLD AUTO: 5.2 K/UL (ref 1.8–7.7)
NEUTROPHILS NFR BLD: 68.4 % (ref 38–73)
NRBC BLD-RTO: 0 /100 WBC
PLATELET # BLD AUTO: 228 K/UL (ref 150–450)
PMV BLD AUTO: 10.1 FL (ref 9.2–12.9)
POTASSIUM SERPL-SCNC: 3.9 MMOL/L (ref 3.5–5.1)
RBC # BLD AUTO: 5.43 M/UL (ref 4.6–6.2)
SODIUM SERPL-SCNC: 139 MMOL/L (ref 136–145)
WBC # BLD AUTO: 7.62 K/UL (ref 3.9–12.7)

## 2021-04-01 PROCEDURE — 85025 COMPLETE CBC W/AUTO DIFF WBC: CPT | Performed by: INTERNAL MEDICINE

## 2021-04-01 PROCEDURE — 80048 BASIC METABOLIC PNL TOTAL CA: CPT | Performed by: INTERNAL MEDICINE

## 2021-04-01 PROCEDURE — 36415 COLL VENOUS BLD VENIPUNCTURE: CPT | Performed by: INTERNAL MEDICINE

## 2021-04-05 ENCOUNTER — LAB VISIT (OUTPATIENT)
Dept: LAB | Facility: HOSPITAL | Age: 46
End: 2021-04-05
Payer: MEDICARE

## 2021-04-05 DIAGNOSIS — I27.20 PULMONARY HYPERTENSION: ICD-10-CM

## 2021-04-05 DIAGNOSIS — I27.9 CHRONIC PULMONARY HEART DISEASE: ICD-10-CM

## 2021-04-05 DIAGNOSIS — Z79.01 LONG TERM CURRENT USE OF ANTICOAGULANT THERAPY: ICD-10-CM

## 2021-04-05 LAB
ALBUMIN SERPL BCP-MCNC: 2.8 G/DL (ref 3.5–5.2)
ALP SERPL-CCNC: 166 U/L (ref 55–135)
ALT SERPL W/O P-5'-P-CCNC: 28 U/L (ref 10–44)
AST SERPL-CCNC: 31 U/L (ref 10–40)
BILIRUB DIRECT SERPL-MCNC: 0.3 MG/DL (ref 0.1–0.3)
BILIRUB SERPL-MCNC: 0.7 MG/DL (ref 0.1–1)
INR PPP: 1.5 (ref 0.8–1.2)
PROT SERPL-MCNC: 8.4 G/DL (ref 6–8.4)
PROTHROMBIN TIME: 15.9 SEC (ref 9–12.5)

## 2021-04-05 PROCEDURE — 85610 PROTHROMBIN TIME: CPT | Performed by: INTERNAL MEDICINE

## 2021-04-05 PROCEDURE — 36415 COLL VENOUS BLD VENIPUNCTURE: CPT | Performed by: INTERNAL MEDICINE

## 2021-04-05 PROCEDURE — 80076 HEPATIC FUNCTION PANEL: CPT | Performed by: STUDENT IN AN ORGANIZED HEALTH CARE EDUCATION/TRAINING PROGRAM

## 2021-04-06 ENCOUNTER — ANTI-COAG VISIT (OUTPATIENT)
Dept: CARDIOLOGY | Facility: CLINIC | Age: 46
End: 2021-04-06
Payer: MEDICARE

## 2021-04-06 DIAGNOSIS — Z79.01 LONG TERM CURRENT USE OF ANTICOAGULANT THERAPY: ICD-10-CM

## 2021-04-06 DIAGNOSIS — I27.9 CHRONIC PULMONARY HEART DISEASE: Primary | ICD-10-CM

## 2021-04-06 PROCEDURE — 93793 ANTICOAG MGMT PT WARFARIN: CPT | Mod: S$GLB,,, | Performed by: PHARMACIST

## 2021-04-06 PROCEDURE — 93793 PR ANTICOAGULANT MGMT FOR PT TAKING WARFARIN: ICD-10-PCS | Mod: S$GLB,,, | Performed by: PHARMACIST

## 2021-04-19 ENCOUNTER — LAB VISIT (OUTPATIENT)
Dept: LAB | Facility: HOSPITAL | Age: 46
End: 2021-04-19
Payer: MEDICARE

## 2021-04-19 DIAGNOSIS — I27.9 CHRONIC PULMONARY HEART DISEASE: ICD-10-CM

## 2021-04-19 DIAGNOSIS — Z79.01 LONG TERM CURRENT USE OF ANTICOAGULANT THERAPY: ICD-10-CM

## 2021-04-19 LAB
INR PPP: 3.2 (ref 0.8–1.2)
PROTHROMBIN TIME: 32.6 SEC (ref 9–12.5)

## 2021-04-19 PROCEDURE — 85610 PROTHROMBIN TIME: CPT | Performed by: INTERNAL MEDICINE

## 2021-04-19 PROCEDURE — 36415 COLL VENOUS BLD VENIPUNCTURE: CPT | Performed by: INTERNAL MEDICINE

## 2021-04-20 ENCOUNTER — ANTI-COAG VISIT (OUTPATIENT)
Dept: CARDIOLOGY | Facility: CLINIC | Age: 46
End: 2021-04-20
Payer: MEDICARE

## 2021-04-20 DIAGNOSIS — I27.9 CHRONIC PULMONARY HEART DISEASE: Primary | ICD-10-CM

## 2021-04-20 DIAGNOSIS — Z79.01 LONG TERM CURRENT USE OF ANTICOAGULANT THERAPY: ICD-10-CM

## 2021-04-20 PROCEDURE — 93793 ANTICOAG MGMT PT WARFARIN: CPT | Mod: S$GLB,,, | Performed by: PHARMACIST

## 2021-04-20 PROCEDURE — 93793 PR ANTICOAGULANT MGMT FOR PT TAKING WARFARIN: ICD-10-PCS | Mod: S$GLB,,, | Performed by: PHARMACIST

## 2021-05-03 ENCOUNTER — ANTI-COAG VISIT (OUTPATIENT)
Dept: CARDIOLOGY | Facility: CLINIC | Age: 46
End: 2021-05-03
Payer: MEDICARE

## 2021-05-03 ENCOUNTER — LAB VISIT (OUTPATIENT)
Dept: LAB | Facility: HOSPITAL | Age: 46
End: 2021-05-03
Payer: MEDICARE

## 2021-05-03 DIAGNOSIS — I27.9 CHRONIC PULMONARY HEART DISEASE: Primary | ICD-10-CM

## 2021-05-03 DIAGNOSIS — Z79.01 LONG TERM CURRENT USE OF ANTICOAGULANT THERAPY: ICD-10-CM

## 2021-05-03 DIAGNOSIS — I27.9 CHRONIC PULMONARY HEART DISEASE: ICD-10-CM

## 2021-05-03 LAB
INR PPP: 2.3 (ref 0.8–1.2)
PROTHROMBIN TIME: 23.9 SEC (ref 9–12.5)

## 2021-05-03 PROCEDURE — 36415 COLL VENOUS BLD VENIPUNCTURE: CPT | Performed by: INTERNAL MEDICINE

## 2021-05-03 PROCEDURE — 85610 PROTHROMBIN TIME: CPT | Performed by: INTERNAL MEDICINE

## 2021-05-03 PROCEDURE — 93793 ANTICOAG MGMT PT WARFARIN: CPT | Mod: S$GLB,,, | Performed by: PHARMACIST

## 2021-05-03 PROCEDURE — 93793 PR ANTICOAGULANT MGMT FOR PT TAKING WARFARIN: ICD-10-PCS | Mod: S$GLB,,, | Performed by: PHARMACIST

## 2021-05-17 ENCOUNTER — LAB VISIT (OUTPATIENT)
Dept: LAB | Facility: HOSPITAL | Age: 46
End: 2021-05-17
Payer: MEDICARE

## 2021-05-17 DIAGNOSIS — Z79.01 LONG TERM CURRENT USE OF ANTICOAGULANT THERAPY: ICD-10-CM

## 2021-05-17 DIAGNOSIS — I27.9 CHRONIC PULMONARY HEART DISEASE: ICD-10-CM

## 2021-05-17 DIAGNOSIS — I27.20 PULMONARY HYPERTENSION: ICD-10-CM

## 2021-05-17 LAB
ALBUMIN SERPL BCP-MCNC: 3.1 G/DL (ref 3.5–5.2)
ALP SERPL-CCNC: 180 U/L (ref 55–135)
ALT SERPL W/O P-5'-P-CCNC: 22 U/L (ref 10–44)
AST SERPL-CCNC: 25 U/L (ref 10–40)
BILIRUB DIRECT SERPL-MCNC: 0.2 MG/DL (ref 0.1–0.3)
BILIRUB SERPL-MCNC: 0.4 MG/DL (ref 0.1–1)
INR PPP: 2.3 (ref 0.8–1.2)
PROT SERPL-MCNC: 9.8 G/DL (ref 6–8.4)
PROTHROMBIN TIME: 24 SEC (ref 9–12.5)

## 2021-05-17 PROCEDURE — 36415 COLL VENOUS BLD VENIPUNCTURE: CPT | Performed by: INTERNAL MEDICINE

## 2021-05-17 PROCEDURE — 85610 PROTHROMBIN TIME: CPT | Performed by: INTERNAL MEDICINE

## 2021-05-17 PROCEDURE — 80076 HEPATIC FUNCTION PANEL: CPT | Performed by: STUDENT IN AN ORGANIZED HEALTH CARE EDUCATION/TRAINING PROGRAM

## 2021-05-18 ENCOUNTER — ANTI-COAG VISIT (OUTPATIENT)
Dept: CARDIOLOGY | Facility: CLINIC | Age: 46
End: 2021-05-18
Payer: MEDICARE

## 2021-05-18 DIAGNOSIS — Z79.01 LONG TERM CURRENT USE OF ANTICOAGULANT THERAPY: ICD-10-CM

## 2021-05-18 DIAGNOSIS — I27.9 CHRONIC PULMONARY HEART DISEASE: Primary | ICD-10-CM

## 2021-05-18 PROCEDURE — 93793 ANTICOAG MGMT PT WARFARIN: CPT | Mod: S$GLB,,, | Performed by: PHARMACIST

## 2021-05-18 PROCEDURE — 93793 PR ANTICOAGULANT MGMT FOR PT TAKING WARFARIN: ICD-10-PCS | Mod: S$GLB,,, | Performed by: PHARMACIST

## 2021-05-31 ENCOUNTER — LAB VISIT (OUTPATIENT)
Dept: LAB | Facility: HOSPITAL | Age: 46
End: 2021-05-31
Payer: MEDICARE

## 2021-05-31 ENCOUNTER — ANTI-COAG VISIT (OUTPATIENT)
Dept: CARDIOLOGY | Facility: CLINIC | Age: 46
End: 2021-05-31
Payer: MEDICARE

## 2021-05-31 DIAGNOSIS — I27.9 CHRONIC PULMONARY HEART DISEASE: Primary | ICD-10-CM

## 2021-05-31 DIAGNOSIS — I27.9 CHRONIC PULMONARY HEART DISEASE: ICD-10-CM

## 2021-05-31 DIAGNOSIS — Z79.01 LONG TERM CURRENT USE OF ANTICOAGULANT THERAPY: ICD-10-CM

## 2021-05-31 LAB
INR PPP: 1.7 (ref 0.8–1.2)
PROTHROMBIN TIME: 17.7 SEC (ref 9–12.5)

## 2021-05-31 PROCEDURE — 93793 ANTICOAG MGMT PT WARFARIN: CPT | Mod: S$GLB,,, | Performed by: PHARMACIST

## 2021-05-31 PROCEDURE — 93793 PR ANTICOAGULANT MGMT FOR PT TAKING WARFARIN: ICD-10-PCS | Mod: S$GLB,,, | Performed by: PHARMACIST

## 2021-05-31 PROCEDURE — 85610 PROTHROMBIN TIME: CPT | Performed by: INTERNAL MEDICINE

## 2021-05-31 PROCEDURE — 36415 COLL VENOUS BLD VENIPUNCTURE: CPT | Performed by: INTERNAL MEDICINE

## 2021-06-01 ENCOUNTER — EPISODE CHANGES (OUTPATIENT)
Dept: TRANSPLANT | Facility: CLINIC | Age: 46
End: 2021-06-01

## 2021-06-06 RX ORDER — POTASSIUM CHLORIDE 750 MG/1
CAPSULE, EXTENDED RELEASE ORAL
Qty: 30 CAPSULE | Refills: 11 | Status: SHIPPED | OUTPATIENT
Start: 2021-06-06 | End: 2022-06-10 | Stop reason: SDUPTHER

## 2021-06-07 ENCOUNTER — PATIENT MESSAGE (OUTPATIENT)
Dept: INTERNAL MEDICINE | Facility: CLINIC | Age: 46
End: 2021-06-07

## 2021-06-07 DIAGNOSIS — Z79.01 LONG TERM CURRENT USE OF ANTICOAGULANT THERAPY: ICD-10-CM

## 2021-06-07 DIAGNOSIS — G47.33 OSA (OBSTRUCTIVE SLEEP APNEA): ICD-10-CM

## 2021-06-07 DIAGNOSIS — I27.9 CHRONIC CARDIOPULMONARY DISEASE: ICD-10-CM

## 2021-06-07 DIAGNOSIS — I27.9 CHRONIC PULMONARY HEART DISEASE: ICD-10-CM

## 2021-06-07 DIAGNOSIS — I27.81 COR PULMONALE: ICD-10-CM

## 2021-06-07 DIAGNOSIS — E66.01 MORBID OBESITY: ICD-10-CM

## 2021-06-07 DIAGNOSIS — E66.2 PICKWICKIAN SYNDROME: ICD-10-CM

## 2021-06-08 RX ORDER — WARFARIN 10 MG/1
TABLET ORAL
Qty: 45 TABLET | Refills: 3 | Status: ON HOLD | OUTPATIENT
Start: 2021-06-08 | End: 2022-03-21

## 2021-06-15 ENCOUNTER — LAB VISIT (OUTPATIENT)
Dept: LAB | Facility: HOSPITAL | Age: 46
End: 2021-06-15
Payer: MEDICARE

## 2021-06-15 DIAGNOSIS — Z79.01 LONG TERM CURRENT USE OF ANTICOAGULANT THERAPY: ICD-10-CM

## 2021-06-15 DIAGNOSIS — I27.9 CHRONIC PULMONARY HEART DISEASE: ICD-10-CM

## 2021-06-15 LAB
INR PPP: 1.7 (ref 0.8–1.2)
PROTHROMBIN TIME: 18.1 SEC (ref 9–12.5)

## 2021-06-15 PROCEDURE — 85610 PROTHROMBIN TIME: CPT | Performed by: INTERNAL MEDICINE

## 2021-06-15 PROCEDURE — 36415 COLL VENOUS BLD VENIPUNCTURE: CPT | Performed by: INTERNAL MEDICINE

## 2021-06-16 ENCOUNTER — ANTI-COAG VISIT (OUTPATIENT)
Dept: CARDIOLOGY | Facility: CLINIC | Age: 46
End: 2021-06-16
Payer: MEDICARE

## 2021-06-16 DIAGNOSIS — I27.9 CHRONIC PULMONARY HEART DISEASE: Primary | ICD-10-CM

## 2021-06-16 DIAGNOSIS — Z79.01 LONG TERM CURRENT USE OF ANTICOAGULANT THERAPY: ICD-10-CM

## 2021-06-16 PROCEDURE — 93793 PR ANTICOAGULANT MGMT FOR PT TAKING WARFARIN: ICD-10-PCS | Mod: S$GLB,,, | Performed by: PHARMACIST

## 2021-06-16 PROCEDURE — 93793 ANTICOAG MGMT PT WARFARIN: CPT | Mod: S$GLB,,, | Performed by: PHARMACIST

## 2021-06-28 ENCOUNTER — ANTI-COAG VISIT (OUTPATIENT)
Dept: CARDIOLOGY | Facility: CLINIC | Age: 46
End: 2021-06-28
Payer: MEDICARE

## 2021-06-28 ENCOUNTER — LAB VISIT (OUTPATIENT)
Dept: LAB | Facility: HOSPITAL | Age: 46
End: 2021-06-28
Payer: MEDICARE

## 2021-06-28 DIAGNOSIS — I27.20 PULMONARY HYPERTENSION: ICD-10-CM

## 2021-06-28 DIAGNOSIS — I27.9 CHRONIC PULMONARY HEART DISEASE: ICD-10-CM

## 2021-06-28 DIAGNOSIS — Z79.01 LONG TERM CURRENT USE OF ANTICOAGULANT THERAPY: ICD-10-CM

## 2021-06-28 DIAGNOSIS — Z79.01 LONG TERM CURRENT USE OF ANTICOAGULANT THERAPY: Primary | ICD-10-CM

## 2021-06-28 LAB
ALBUMIN SERPL BCP-MCNC: 2.9 G/DL (ref 3.5–5.2)
ALP SERPL-CCNC: 178 U/L (ref 55–135)
ALT SERPL W/O P-5'-P-CCNC: 31 U/L (ref 10–44)
AST SERPL-CCNC: 36 U/L (ref 10–40)
BILIRUB DIRECT SERPL-MCNC: 0.3 MG/DL (ref 0.1–0.3)
BILIRUB SERPL-MCNC: 0.8 MG/DL (ref 0.1–1)
INR PPP: 2 (ref 0.8–1.2)
PROT SERPL-MCNC: 9.5 G/DL (ref 6–8.4)
PROTHROMBIN TIME: 20.6 SEC (ref 9–12.5)

## 2021-06-28 PROCEDURE — 85610 PROTHROMBIN TIME: CPT | Performed by: INTERNAL MEDICINE

## 2021-06-28 PROCEDURE — 93793 PR ANTICOAGULANT MGMT FOR PT TAKING WARFARIN: ICD-10-PCS | Mod: S$GLB,,,

## 2021-06-28 PROCEDURE — 36415 COLL VENOUS BLD VENIPUNCTURE: CPT | Performed by: INTERNAL MEDICINE

## 2021-06-28 PROCEDURE — 80076 HEPATIC FUNCTION PANEL: CPT | Performed by: STUDENT IN AN ORGANIZED HEALTH CARE EDUCATION/TRAINING PROGRAM

## 2021-06-28 PROCEDURE — 93793 ANTICOAG MGMT PT WARFARIN: CPT | Mod: S$GLB,,,

## 2021-07-12 ENCOUNTER — LAB VISIT (OUTPATIENT)
Dept: LAB | Facility: HOSPITAL | Age: 46
End: 2021-07-12
Payer: MEDICARE

## 2021-07-12 DIAGNOSIS — I27.9 CHRONIC PULMONARY HEART DISEASE: ICD-10-CM

## 2021-07-12 DIAGNOSIS — Z79.01 LONG TERM CURRENT USE OF ANTICOAGULANT THERAPY: ICD-10-CM

## 2021-07-12 LAB
INR PPP: 3.3 (ref 0.8–1.2)
PROTHROMBIN TIME: 33.5 SEC (ref 9–12.5)

## 2021-07-12 PROCEDURE — 85610 PROTHROMBIN TIME: CPT | Performed by: INTERNAL MEDICINE

## 2021-07-12 PROCEDURE — 36415 COLL VENOUS BLD VENIPUNCTURE: CPT | Performed by: INTERNAL MEDICINE

## 2021-07-13 ENCOUNTER — ANTI-COAG VISIT (OUTPATIENT)
Dept: CARDIOLOGY | Facility: CLINIC | Age: 46
End: 2021-07-13
Payer: MEDICARE

## 2021-07-13 DIAGNOSIS — Z79.01 LONG TERM CURRENT USE OF ANTICOAGULANT THERAPY: ICD-10-CM

## 2021-07-13 DIAGNOSIS — I27.9 CHRONIC PULMONARY HEART DISEASE: Primary | ICD-10-CM

## 2021-07-13 PROCEDURE — 93793 PR ANTICOAGULANT MGMT FOR PT TAKING WARFARIN: ICD-10-PCS | Mod: S$GLB,,, | Performed by: PHARMACIST

## 2021-07-13 PROCEDURE — 93793 ANTICOAG MGMT PT WARFARIN: CPT | Mod: S$GLB,,, | Performed by: PHARMACIST

## 2021-07-26 ENCOUNTER — LAB VISIT (OUTPATIENT)
Dept: LAB | Facility: HOSPITAL | Age: 46
End: 2021-07-26
Attending: INTERNAL MEDICINE
Payer: MEDICARE

## 2021-07-26 DIAGNOSIS — I27.20 PULMONARY HYPERTENSION: ICD-10-CM

## 2021-07-26 DIAGNOSIS — Z79.01 LONG TERM CURRENT USE OF ANTICOAGULANT THERAPY: ICD-10-CM

## 2021-07-26 DIAGNOSIS — I27.9 CHRONIC PULMONARY HEART DISEASE: ICD-10-CM

## 2021-07-26 LAB
ALBUMIN SERPL BCP-MCNC: 2.9 G/DL (ref 3.5–5.2)
ALP SERPL-CCNC: 126 U/L (ref 55–135)
ALT SERPL W/O P-5'-P-CCNC: 22 U/L (ref 10–44)
AST SERPL-CCNC: 23 U/L (ref 10–40)
BILIRUB DIRECT SERPL-MCNC: 0.3 MG/DL (ref 0.1–0.3)
BILIRUB SERPL-MCNC: 0.6 MG/DL (ref 0.1–1)
INR PPP: 1.5 (ref 0.8–1.2)
PROT SERPL-MCNC: 8.6 G/DL (ref 6–8.4)
PROTHROMBIN TIME: 16.4 SEC (ref 9–12.5)

## 2021-07-26 PROCEDURE — 36415 COLL VENOUS BLD VENIPUNCTURE: CPT | Performed by: INTERNAL MEDICINE

## 2021-07-26 PROCEDURE — 80076 HEPATIC FUNCTION PANEL: CPT | Performed by: STUDENT IN AN ORGANIZED HEALTH CARE EDUCATION/TRAINING PROGRAM

## 2021-07-26 PROCEDURE — 85610 PROTHROMBIN TIME: CPT | Performed by: INTERNAL MEDICINE

## 2021-07-27 ENCOUNTER — ANTI-COAG VISIT (OUTPATIENT)
Dept: CARDIOLOGY | Facility: CLINIC | Age: 46
End: 2021-07-27
Payer: MEDICARE

## 2021-07-27 DIAGNOSIS — I27.9 CHRONIC PULMONARY HEART DISEASE: Primary | ICD-10-CM

## 2021-07-27 DIAGNOSIS — Z79.01 LONG TERM CURRENT USE OF ANTICOAGULANT THERAPY: ICD-10-CM

## 2021-07-27 PROCEDURE — 93793 ANTICOAG MGMT PT WARFARIN: CPT | Mod: S$GLB,,, | Performed by: PHARMACIST

## 2021-07-27 PROCEDURE — 93793 PR ANTICOAGULANT MGMT FOR PT TAKING WARFARIN: ICD-10-PCS | Mod: S$GLB,,, | Performed by: PHARMACIST

## 2021-08-09 ENCOUNTER — LAB VISIT (OUTPATIENT)
Dept: LAB | Facility: HOSPITAL | Age: 46
End: 2021-08-09
Attending: INTERNAL MEDICINE
Payer: MEDICARE

## 2021-08-09 DIAGNOSIS — Z79.01 LONG TERM CURRENT USE OF ANTICOAGULANT THERAPY: ICD-10-CM

## 2021-08-09 DIAGNOSIS — I27.9 CHRONIC PULMONARY HEART DISEASE: ICD-10-CM

## 2021-08-09 LAB
INR PPP: 4.2 (ref 0.8–1.2)
PROTHROMBIN TIME: 42.1 SEC (ref 9–12.5)

## 2021-08-09 PROCEDURE — 85610 PROTHROMBIN TIME: CPT | Performed by: INTERNAL MEDICINE

## 2021-08-09 PROCEDURE — 36415 COLL VENOUS BLD VENIPUNCTURE: CPT | Performed by: INTERNAL MEDICINE

## 2021-08-10 ENCOUNTER — ANTI-COAG VISIT (OUTPATIENT)
Dept: CARDIOLOGY | Facility: CLINIC | Age: 46
End: 2021-08-10
Payer: MEDICARE

## 2021-08-10 DIAGNOSIS — I27.9 CHRONIC PULMONARY HEART DISEASE: Primary | ICD-10-CM

## 2021-08-10 DIAGNOSIS — Z79.01 LONG TERM CURRENT USE OF ANTICOAGULANT THERAPY: ICD-10-CM

## 2021-08-10 PROCEDURE — 93793 PR ANTICOAGULANT MGMT FOR PT TAKING WARFARIN: ICD-10-PCS | Mod: S$GLB,,,

## 2021-08-10 PROCEDURE — 93793 ANTICOAG MGMT PT WARFARIN: CPT | Mod: S$GLB,,,

## 2021-08-16 ENCOUNTER — ANTI-COAG VISIT (OUTPATIENT)
Dept: CARDIOLOGY | Facility: CLINIC | Age: 46
End: 2021-08-16
Payer: MEDICARE

## 2021-08-16 ENCOUNTER — LAB VISIT (OUTPATIENT)
Dept: LAB | Facility: HOSPITAL | Age: 46
End: 2021-08-16
Attending: INTERNAL MEDICINE
Payer: MEDICARE

## 2021-08-16 DIAGNOSIS — Z79.01 LONG TERM CURRENT USE OF ANTICOAGULANT THERAPY: ICD-10-CM

## 2021-08-16 DIAGNOSIS — I27.9 CHRONIC PULMONARY HEART DISEASE: Primary | ICD-10-CM

## 2021-08-16 DIAGNOSIS — I27.9 CHRONIC PULMONARY HEART DISEASE: ICD-10-CM

## 2021-08-16 LAB
INR PPP: 3.2 (ref 0.8–1.2)
PROTHROMBIN TIME: 32.3 SEC (ref 9–12.5)

## 2021-08-16 PROCEDURE — 93793 PR ANTICOAGULANT MGMT FOR PT TAKING WARFARIN: ICD-10-PCS | Mod: S$GLB,,, | Performed by: PHARMACIST

## 2021-08-16 PROCEDURE — 85610 PROTHROMBIN TIME: CPT | Performed by: INTERNAL MEDICINE

## 2021-08-16 PROCEDURE — 36415 COLL VENOUS BLD VENIPUNCTURE: CPT | Performed by: INTERNAL MEDICINE

## 2021-08-16 PROCEDURE — 93793 ANTICOAG MGMT PT WARFARIN: CPT | Mod: S$GLB,,, | Performed by: PHARMACIST

## 2021-08-23 ENCOUNTER — LAB VISIT (OUTPATIENT)
Dept: LAB | Facility: HOSPITAL | Age: 46
End: 2021-08-23
Attending: INTERNAL MEDICINE
Payer: MEDICARE

## 2021-08-23 ENCOUNTER — ANTI-COAG VISIT (OUTPATIENT)
Dept: CARDIOLOGY | Facility: CLINIC | Age: 46
End: 2021-08-23
Payer: MEDICARE

## 2021-08-23 DIAGNOSIS — Z79.01 LONG TERM CURRENT USE OF ANTICOAGULANT THERAPY: ICD-10-CM

## 2021-08-23 DIAGNOSIS — I27.20 PULMONARY HYPERTENSION: ICD-10-CM

## 2021-08-23 DIAGNOSIS — I27.9 CHRONIC PULMONARY HEART DISEASE: Primary | ICD-10-CM

## 2021-08-23 DIAGNOSIS — I27.9 CHRONIC PULMONARY HEART DISEASE: ICD-10-CM

## 2021-08-23 LAB
ALBUMIN SERPL BCP-MCNC: 3.2 G/DL (ref 3.5–5.2)
ALP SERPL-CCNC: 128 U/L (ref 55–135)
ALT SERPL W/O P-5'-P-CCNC: 24 U/L (ref 10–44)
AST SERPL-CCNC: 28 U/L (ref 10–40)
BILIRUB DIRECT SERPL-MCNC: 0.3 MG/DL (ref 0.1–0.3)
BILIRUB SERPL-MCNC: 0.9 MG/DL (ref 0.1–1)
INR PPP: 3.9 (ref 0.8–1.2)
PROT SERPL-MCNC: 9.2 G/DL (ref 6–8.4)
PROTHROMBIN TIME: 39.5 SEC (ref 9–12.5)

## 2021-08-23 PROCEDURE — 93793 ANTICOAG MGMT PT WARFARIN: CPT | Mod: S$GLB,,, | Performed by: PHARMACIST

## 2021-08-23 PROCEDURE — 36415 COLL VENOUS BLD VENIPUNCTURE: CPT | Performed by: INTERNAL MEDICINE

## 2021-08-23 PROCEDURE — 80076 HEPATIC FUNCTION PANEL: CPT | Performed by: STUDENT IN AN ORGANIZED HEALTH CARE EDUCATION/TRAINING PROGRAM

## 2021-08-23 PROCEDURE — 85610 PROTHROMBIN TIME: CPT | Performed by: INTERNAL MEDICINE

## 2021-08-23 PROCEDURE — 93793 PR ANTICOAGULANT MGMT FOR PT TAKING WARFARIN: ICD-10-PCS | Mod: S$GLB,,, | Performed by: PHARMACIST

## 2021-09-23 ENCOUNTER — LAB VISIT (OUTPATIENT)
Dept: LAB | Facility: HOSPITAL | Age: 46
End: 2021-09-23
Attending: INTERNAL MEDICINE
Payer: MEDICARE

## 2021-09-23 DIAGNOSIS — Z79.01 LONG TERM CURRENT USE OF ANTICOAGULANT THERAPY: ICD-10-CM

## 2021-09-23 DIAGNOSIS — I27.9 CHRONIC PULMONARY HEART DISEASE: ICD-10-CM

## 2021-09-23 DIAGNOSIS — I27.20 PULMONARY HYPERTENSION: ICD-10-CM

## 2021-09-23 LAB
ALBUMIN SERPL BCP-MCNC: 2.6 G/DL (ref 3.5–5.2)
ALP SERPL-CCNC: 143 U/L (ref 55–135)
ALT SERPL W/O P-5'-P-CCNC: 23 U/L (ref 10–44)
AST SERPL-CCNC: 23 U/L (ref 10–40)
BILIRUB DIRECT SERPL-MCNC: 0.3 MG/DL (ref 0.1–0.3)
BILIRUB SERPL-MCNC: 0.7 MG/DL (ref 0.1–1)
INR PPP: 2.4 (ref 0.8–1.2)
PROT SERPL-MCNC: 8.2 G/DL (ref 6–8.4)
PROTHROMBIN TIME: 24.9 SEC (ref 9–12.5)

## 2021-09-23 PROCEDURE — 85610 PROTHROMBIN TIME: CPT | Performed by: INTERNAL MEDICINE

## 2021-09-23 PROCEDURE — 36415 COLL VENOUS BLD VENIPUNCTURE: CPT | Performed by: INTERNAL MEDICINE

## 2021-09-23 PROCEDURE — 80076 HEPATIC FUNCTION PANEL: CPT | Performed by: STUDENT IN AN ORGANIZED HEALTH CARE EDUCATION/TRAINING PROGRAM

## 2021-09-24 ENCOUNTER — ANTI-COAG VISIT (OUTPATIENT)
Dept: CARDIOLOGY | Facility: CLINIC | Age: 46
End: 2021-09-24
Payer: MEDICARE

## 2021-09-24 DIAGNOSIS — I27.9 CHRONIC PULMONARY HEART DISEASE: Primary | ICD-10-CM

## 2021-09-24 DIAGNOSIS — Z79.01 LONG TERM CURRENT USE OF ANTICOAGULANT THERAPY: ICD-10-CM

## 2021-09-24 PROCEDURE — 93793 PR ANTICOAGULANT MGMT FOR PT TAKING WARFARIN: ICD-10-PCS | Mod: S$GLB,,, | Performed by: PHARMACIST

## 2021-09-24 PROCEDURE — 93793 ANTICOAG MGMT PT WARFARIN: CPT | Mod: S$GLB,,, | Performed by: PHARMACIST

## 2021-10-18 ENCOUNTER — LAB VISIT (OUTPATIENT)
Dept: LAB | Facility: HOSPITAL | Age: 46
End: 2021-10-18
Payer: MEDICARE

## 2021-10-18 ENCOUNTER — IMMUNIZATION (OUTPATIENT)
Dept: INTERNAL MEDICINE | Facility: CLINIC | Age: 46
End: 2021-10-18
Payer: MEDICARE

## 2021-10-18 ENCOUNTER — PATIENT MESSAGE (OUTPATIENT)
Dept: TRANSPLANT | Facility: CLINIC | Age: 46
End: 2021-10-18
Payer: MEDICARE

## 2021-10-18 DIAGNOSIS — I27.9 CHRONIC PULMONARY HEART DISEASE: ICD-10-CM

## 2021-10-18 DIAGNOSIS — Z23 NEED FOR VACCINATION: Primary | ICD-10-CM

## 2021-10-18 DIAGNOSIS — Z79.01 LONG TERM CURRENT USE OF ANTICOAGULANT THERAPY: ICD-10-CM

## 2021-10-18 LAB
INR PPP: 2.7 (ref 0.8–1.2)
PROTHROMBIN TIME: 28.2 SEC (ref 9–12.5)

## 2021-10-18 PROCEDURE — 85610 PROTHROMBIN TIME: CPT | Mod: HCNC | Performed by: INTERNAL MEDICINE

## 2021-10-18 PROCEDURE — 91300 COVID-19, MRNA, LNP-S, PF, 30 MCG/0.3 ML DOSE VACCINE: CPT | Mod: HCNC,PBBFAC | Performed by: INTERNAL MEDICINE

## 2021-10-18 PROCEDURE — 0003A COVID-19, MRNA, LNP-S, PF, 30 MCG/0.3 ML DOSE VACCINE: CPT | Mod: HCNC,CV19,PBBFAC | Performed by: INTERNAL MEDICINE

## 2021-10-18 PROCEDURE — 36415 COLL VENOUS BLD VENIPUNCTURE: CPT | Mod: HCNC | Performed by: INTERNAL MEDICINE

## 2021-10-19 ENCOUNTER — ANTI-COAG VISIT (OUTPATIENT)
Dept: CARDIOLOGY | Facility: CLINIC | Age: 46
End: 2021-10-19
Payer: MEDICARE

## 2021-10-19 DIAGNOSIS — I27.9 CHRONIC PULMONARY HEART DISEASE: Primary | ICD-10-CM

## 2021-10-19 DIAGNOSIS — Z79.01 LONG TERM CURRENT USE OF ANTICOAGULANT THERAPY: ICD-10-CM

## 2021-10-19 PROCEDURE — 93793 ANTICOAG MGMT PT WARFARIN: CPT | Mod: S$GLB,,, | Performed by: PHARMACIST

## 2021-10-19 PROCEDURE — 93793 PR ANTICOAGULANT MGMT FOR PT TAKING WARFARIN: ICD-10-PCS | Mod: S$GLB,,, | Performed by: PHARMACIST

## 2021-11-18 ENCOUNTER — LAB VISIT (OUTPATIENT)
Dept: LAB | Facility: HOSPITAL | Age: 46
End: 2021-11-18
Attending: STUDENT IN AN ORGANIZED HEALTH CARE EDUCATION/TRAINING PROGRAM
Payer: MEDICARE

## 2021-11-18 ENCOUNTER — ANTI-COAG VISIT (OUTPATIENT)
Dept: CARDIOLOGY | Facility: CLINIC | Age: 46
End: 2021-11-18
Payer: MEDICARE

## 2021-11-18 DIAGNOSIS — I27.20 PULMONARY HYPERTENSION: ICD-10-CM

## 2021-11-18 DIAGNOSIS — Z79.01 LONG TERM CURRENT USE OF ANTICOAGULANT THERAPY: ICD-10-CM

## 2021-11-18 DIAGNOSIS — I27.9 CHRONIC PULMONARY HEART DISEASE: ICD-10-CM

## 2021-11-18 DIAGNOSIS — I27.9 CHRONIC PULMONARY HEART DISEASE: Primary | ICD-10-CM

## 2021-11-18 LAB
ALBUMIN SERPL BCP-MCNC: 3.3 G/DL (ref 3.5–5.2)
ALP SERPL-CCNC: 149 U/L (ref 55–135)
ALT SERPL W/O P-5'-P-CCNC: 19 U/L (ref 10–44)
AST SERPL-CCNC: 22 U/L (ref 10–40)
BILIRUB DIRECT SERPL-MCNC: 0.3 MG/DL (ref 0.1–0.3)
BILIRUB SERPL-MCNC: 0.9 MG/DL (ref 0.1–1)
INR PPP: 2 (ref 0.8–1.2)
PROT SERPL-MCNC: 9.8 G/DL (ref 6–8.4)
PROTHROMBIN TIME: 20.5 SEC (ref 9–12.5)

## 2021-11-18 PROCEDURE — 93793 ANTICOAG MGMT PT WARFARIN: CPT | Mod: S$GLB,,,

## 2021-11-18 PROCEDURE — 36415 COLL VENOUS BLD VENIPUNCTURE: CPT | Mod: HCNC | Performed by: INTERNAL MEDICINE

## 2021-11-18 PROCEDURE — 85610 PROTHROMBIN TIME: CPT | Mod: HCNC | Performed by: INTERNAL MEDICINE

## 2021-11-18 PROCEDURE — 93793 PR ANTICOAGULANT MGMT FOR PT TAKING WARFARIN: ICD-10-PCS | Mod: S$GLB,,,

## 2021-11-18 PROCEDURE — 80076 HEPATIC FUNCTION PANEL: CPT | Mod: HCNC | Performed by: STUDENT IN AN ORGANIZED HEALTH CARE EDUCATION/TRAINING PROGRAM

## 2022-01-10 ENCOUNTER — LAB VISIT (OUTPATIENT)
Dept: LAB | Facility: HOSPITAL | Age: 47
End: 2022-01-10
Payer: MEDICARE

## 2022-01-10 ENCOUNTER — ANTI-COAG VISIT (OUTPATIENT)
Dept: CARDIOLOGY | Facility: CLINIC | Age: 47
End: 2022-01-10
Payer: MEDICARE

## 2022-01-10 DIAGNOSIS — Z79.01 LONG TERM CURRENT USE OF ANTICOAGULANT THERAPY: ICD-10-CM

## 2022-01-10 DIAGNOSIS — I27.9 CHRONIC PULMONARY HEART DISEASE: Primary | ICD-10-CM

## 2022-01-10 DIAGNOSIS — I27.9 CHRONIC PULMONARY HEART DISEASE: ICD-10-CM

## 2022-01-10 LAB
INR PPP: 4.8 (ref 0.8–1.2)
PROTHROMBIN TIME: 47.7 SEC (ref 9–12.5)

## 2022-01-10 PROCEDURE — 93793 ANTICOAG MGMT PT WARFARIN: CPT | Mod: S$GLB,,, | Performed by: PHARMACIST

## 2022-01-10 PROCEDURE — 93793 PR ANTICOAGULANT MGMT FOR PT TAKING WARFARIN: ICD-10-PCS | Mod: S$GLB,,, | Performed by: PHARMACIST

## 2022-01-10 PROCEDURE — 85610 PROTHROMBIN TIME: CPT | Mod: HCNC | Performed by: INTERNAL MEDICINE

## 2022-01-11 NOTE — PROGRESS NOTES
Patient reports taking warfarin 10mg on a day he should have had 5mg-unsure what date  4 shots of alcohol over the past week - doesn't usually drink alcohol

## 2022-01-12 NOTE — PROGRESS NOTES
Patient was supposed to take 5mg coumadin yesterday but he took 10mg. He will be advised to hold coumadin today\

## 2022-02-04 ENCOUNTER — PATIENT MESSAGE (OUTPATIENT)
Dept: SLEEP MEDICINE | Facility: CLINIC | Age: 47
End: 2022-02-04
Payer: MEDICARE

## 2022-02-08 ENCOUNTER — LAB VISIT (OUTPATIENT)
Dept: LAB | Facility: HOSPITAL | Age: 47
End: 2022-02-08
Attending: INTERNAL MEDICINE
Payer: MEDICARE

## 2022-02-08 DIAGNOSIS — I27.20 PULMONARY HYPERTENSION: ICD-10-CM

## 2022-02-08 DIAGNOSIS — I27.9 CHRONIC PULMONARY HEART DISEASE: ICD-10-CM

## 2022-02-08 DIAGNOSIS — Z79.01 LONG TERM CURRENT USE OF ANTICOAGULANT THERAPY: ICD-10-CM

## 2022-02-08 LAB
ALBUMIN SERPL BCP-MCNC: 3 G/DL (ref 3.5–5.2)
ALP SERPL-CCNC: 142 U/L (ref 55–135)
ALT SERPL W/O P-5'-P-CCNC: 20 U/L (ref 10–44)
AST SERPL-CCNC: 29 U/L (ref 10–40)
BILIRUB DIRECT SERPL-MCNC: 0.4 MG/DL (ref 0.1–0.3)
BILIRUB SERPL-MCNC: 0.9 MG/DL (ref 0.1–1)
INR PPP: 2.7 (ref 0.8–1.2)
PROT SERPL-MCNC: 9 G/DL (ref 6–8.4)
PROTHROMBIN TIME: 27.1 SEC (ref 9–12.5)

## 2022-02-08 PROCEDURE — 80076 HEPATIC FUNCTION PANEL: CPT | Mod: HCNC | Performed by: STUDENT IN AN ORGANIZED HEALTH CARE EDUCATION/TRAINING PROGRAM

## 2022-02-08 PROCEDURE — 85610 PROTHROMBIN TIME: CPT | Mod: HCNC | Performed by: INTERNAL MEDICINE

## 2022-02-08 PROCEDURE — 36415 COLL VENOUS BLD VENIPUNCTURE: CPT | Mod: HCNC | Performed by: INTERNAL MEDICINE

## 2022-02-09 ENCOUNTER — ANTI-COAG VISIT (OUTPATIENT)
Dept: CARDIOLOGY | Facility: CLINIC | Age: 47
End: 2022-02-09
Payer: MEDICARE

## 2022-02-09 DIAGNOSIS — Z79.01 LONG TERM CURRENT USE OF ANTICOAGULANT THERAPY: ICD-10-CM

## 2022-02-09 DIAGNOSIS — I27.9 CHRONIC PULMONARY HEART DISEASE: Primary | ICD-10-CM

## 2022-02-09 PROCEDURE — 93793 ANTICOAG MGMT PT WARFARIN: CPT | Mod: S$GLB,,, | Performed by: PHARMACIST

## 2022-02-09 PROCEDURE — 93793 PR ANTICOAGULANT MGMT FOR PT TAKING WARFARIN: ICD-10-PCS | Mod: S$GLB,,, | Performed by: PHARMACIST

## 2022-02-15 ENCOUNTER — TELEPHONE (OUTPATIENT)
Dept: SLEEP MEDICINE | Facility: CLINIC | Age: 47
End: 2022-02-15
Payer: MEDICARE

## 2022-02-15 ENCOUNTER — PATIENT MESSAGE (OUTPATIENT)
Dept: SLEEP MEDICINE | Facility: CLINIC | Age: 47
End: 2022-02-15
Payer: MEDICARE

## 2022-02-15 NOTE — TELEPHONE ENCOUNTER
Staff reached out to the pt again and he did not answer, so I left another message to inform him that Elba is not in clinic today and that we need to reschedule his appt

## 2022-02-15 NOTE — TELEPHONE ENCOUNTER
Staff reached out to the pt and he did not answer, so I left a message on his vm to inform him that Elba will not be in today and that we need to reschedule his appt

## 2022-02-15 NOTE — TELEPHONE ENCOUNTER
Staff reached out to the pt and he did not answer, so I left a message on his vm to inform him that Elba will not be in clinic today and that we need to reschedule his appt

## 2022-02-15 NOTE — TELEPHONE ENCOUNTER
Staff reached out to pt and he did not answer, so I left a message on his vm to inform him that Elba will not be in today and we need to reschedule his appt

## 2022-03-09 ENCOUNTER — LAB VISIT (OUTPATIENT)
Dept: LAB | Facility: HOSPITAL | Age: 47
End: 2022-03-09
Attending: STUDENT IN AN ORGANIZED HEALTH CARE EDUCATION/TRAINING PROGRAM
Payer: MEDICARE

## 2022-03-09 ENCOUNTER — PATIENT MESSAGE (OUTPATIENT)
Dept: INTERNAL MEDICINE | Facility: CLINIC | Age: 47
End: 2022-03-09
Payer: MEDICARE

## 2022-03-09 DIAGNOSIS — I27.9 CHRONIC PULMONARY HEART DISEASE: ICD-10-CM

## 2022-03-09 DIAGNOSIS — Z79.01 LONG TERM CURRENT USE OF ANTICOAGULANT THERAPY: ICD-10-CM

## 2022-03-09 LAB
INR PPP: 2.4 (ref 0.8–1.2)
PROTHROMBIN TIME: 23.7 SEC (ref 9–12.5)

## 2022-03-09 PROCEDURE — 36415 COLL VENOUS BLD VENIPUNCTURE: CPT | Mod: HCNC | Performed by: INTERNAL MEDICINE

## 2022-03-09 PROCEDURE — 85610 PROTHROMBIN TIME: CPT | Mod: HCNC | Performed by: INTERNAL MEDICINE

## 2022-03-10 ENCOUNTER — ANTI-COAG VISIT (OUTPATIENT)
Dept: CARDIOLOGY | Facility: CLINIC | Age: 47
End: 2022-03-10
Payer: MEDICARE

## 2022-03-10 DIAGNOSIS — Z79.01 LONG TERM CURRENT USE OF ANTICOAGULANT THERAPY: ICD-10-CM

## 2022-03-10 DIAGNOSIS — I27.9 CHRONIC PULMONARY HEART DISEASE: Primary | ICD-10-CM

## 2022-03-10 PROCEDURE — 93793 PR ANTICOAGULANT MGMT FOR PT TAKING WARFARIN: ICD-10-PCS | Mod: S$GLB,,, | Performed by: PHARMACIST

## 2022-03-10 PROCEDURE — 93793 ANTICOAG MGMT PT WARFARIN: CPT | Mod: S$GLB,,, | Performed by: PHARMACIST

## 2022-03-16 ENCOUNTER — PATIENT MESSAGE (OUTPATIENT)
Dept: FAMILY MEDICINE | Facility: CLINIC | Age: 47
End: 2022-03-16
Payer: MEDICARE

## 2022-03-17 ENCOUNTER — HOSPITAL ENCOUNTER (INPATIENT)
Facility: HOSPITAL | Age: 47
LOS: 5 days | Discharge: HOME | DRG: 286 | End: 2022-03-23
Attending: EMERGENCY MEDICINE | Admitting: STUDENT IN AN ORGANIZED HEALTH CARE EDUCATION/TRAINING PROGRAM
Payer: MEDICARE

## 2022-03-17 ENCOUNTER — ANTI-COAG VISIT (OUTPATIENT)
Dept: CARDIOLOGY | Facility: CLINIC | Age: 47
End: 2022-03-17
Payer: MEDICARE

## 2022-03-17 ENCOUNTER — PATIENT MESSAGE (OUTPATIENT)
Dept: INTERNAL MEDICINE | Facility: CLINIC | Age: 47
End: 2022-03-17
Payer: MEDICARE

## 2022-03-17 DIAGNOSIS — R06.02 SHORTNESS OF BREATH: ICD-10-CM

## 2022-03-17 DIAGNOSIS — N18.30 STAGE 3 CHRONIC KIDNEY DISEASE, UNSPECIFIED WHETHER STAGE 3A OR 3B CKD: ICD-10-CM

## 2022-03-17 DIAGNOSIS — R06.89 HYPERCAPNIA: Primary | ICD-10-CM

## 2022-03-17 DIAGNOSIS — I27.9 CHRONIC PULMONARY HEART DISEASE: Primary | ICD-10-CM

## 2022-03-17 DIAGNOSIS — J96.22 ACUTE AND CHRONIC RESPIRATORY FAILURE WITH HYPERCAPNIA: ICD-10-CM

## 2022-03-17 DIAGNOSIS — I27.20 PULMONARY HYPERTENSION: ICD-10-CM

## 2022-03-17 DIAGNOSIS — Z79.01 LONG TERM CURRENT USE OF ANTICOAGULANT THERAPY: ICD-10-CM

## 2022-03-17 DIAGNOSIS — I50.9 ACUTE DECOMPENSATED HEART FAILURE: ICD-10-CM

## 2022-03-17 DIAGNOSIS — I50.9 HEART FAILURE: ICD-10-CM

## 2022-03-17 DIAGNOSIS — E66.01 MORBID OBESITY WITH BMI OF 50.0-59.9, ADULT: ICD-10-CM

## 2022-03-17 DIAGNOSIS — R07.9 CHEST PAIN: ICD-10-CM

## 2022-03-17 DIAGNOSIS — R09.02 HYPOXIA: ICD-10-CM

## 2022-03-17 PROCEDURE — 99284 PR EMERGENCY DEPT VISIT,LEVEL IV: ICD-10-PCS | Mod: HCNC,GC,CS, | Performed by: EMERGENCY MEDICINE

## 2022-03-17 PROCEDURE — 93010 EKG 12-LEAD: ICD-10-PCS | Mod: HCNC,,, | Performed by: INTERNAL MEDICINE

## 2022-03-17 PROCEDURE — 99285 EMERGENCY DEPT VISIT HI MDM: CPT | Mod: 25

## 2022-03-17 PROCEDURE — 99284 EMERGENCY DEPT VISIT MOD MDM: CPT | Mod: HCNC,GC,CS, | Performed by: EMERGENCY MEDICINE

## 2022-03-17 PROCEDURE — 93010 ELECTROCARDIOGRAM REPORT: CPT | Mod: HCNC,,, | Performed by: INTERNAL MEDICINE

## 2022-03-17 PROCEDURE — 93793 ANTICOAG MGMT PT WARFARIN: CPT | Mod: S$GLB,,, | Performed by: PHARMACIST

## 2022-03-17 PROCEDURE — 93793 PR ANTICOAGULANT MGMT FOR PT TAKING WARFARIN: ICD-10-PCS | Mod: S$GLB,,, | Performed by: PHARMACIST

## 2022-03-17 PROCEDURE — 93005 ELECTROCARDIOGRAM TRACING: CPT | Mod: HCNC

## 2022-03-17 NOTE — Clinical Note
The PA catheter was repositioned to the main pulmonary artery. Hemodynamics were performed. Cardiac output was obtained at 6 L/min. O2 saturation was measured at 73%.   CO = 6.47   CI = 2.78

## 2022-03-17 NOTE — Clinical Note
The right neck was prepped. The site was prepped with ChloraPrep. The site was clipped. The patient was draped. The patient was positioned supine.  Silver Nitrate Text: The wound bed was treated with silver nitrate after the biopsy was performed.

## 2022-03-18 LAB
ALBUMIN SERPL BCP-MCNC: 3.1 G/DL (ref 3.5–5.2)
ALLENS TEST: ABNORMAL
ALP SERPL-CCNC: 132 U/L (ref 55–135)
ALT SERPL W/O P-5'-P-CCNC: 21 U/L (ref 10–44)
ANION GAP SERPL CALC-SCNC: 11 MMOL/L (ref 8–16)
ASCENDING AORTA: 2.8 CM
AST SERPL-CCNC: 27 U/L (ref 10–40)
AV INDEX (PROSTH): 1.07
AV MEAN GRADIENT: 2 MMHG
AV PEAK GRADIENT: 3 MMHG
AV VALVE AREA: 3.49 CM2
AV VELOCITY RATIO: 0.8
BASOPHILS # BLD AUTO: 0.04 K/UL (ref 0–0.2)
BASOPHILS # BLD AUTO: 0.04 K/UL (ref 0–0.2)
BASOPHILS NFR BLD: 0.5 % (ref 0–1.9)
BASOPHILS NFR BLD: 0.5 % (ref 0–1.9)
BILIRUB SERPL-MCNC: 0.7 MG/DL (ref 0.1–1)
BILIRUB UR QL STRIP: NEGATIVE
BNP SERPL-MCNC: 26 PG/ML (ref 0–99)
BSA FOR ECHO PROCEDURE: 2.62 M2
BUN SERPL-MCNC: 24 MG/DL (ref 6–20)
CALCIUM SERPL-MCNC: 9.7 MG/DL (ref 8.7–10.5)
CHLORIDE SERPL-SCNC: 86 MMOL/L (ref 95–110)
CLARITY UR REFRACT.AUTO: CLEAR
CO2 SERPL-SCNC: 40 MMOL/L (ref 23–29)
COLOR UR AUTO: NORMAL
CREAT SERPL-MCNC: 1.3 MG/DL (ref 0.5–1.4)
CTP QC/QA: YES
CV ECHO LV RWT: 0.34 CM
DELSYS: ABNORMAL
DIFFERENTIAL METHOD: ABNORMAL
DIFFERENTIAL METHOD: ABNORMAL
DOP CALC AO PEAK VEL: 0.86 M/S
DOP CALC AO VTI: 12.75 CM
DOP CALC LVOT AREA: 3.3 CM2
DOP CALC LVOT DIAMETER: 2.04 CM
DOP CALC LVOT PEAK VEL: 0.69 M/S
DOP CALC LVOT STROKE VOLUME: 44.49 CM3
DOP CALCLVOT PEAK VEL VTI: 13.62 CM
ECHO LV POSTERIOR WALL: 0.91 CM (ref 0.6–1.1)
EJECTION FRACTION: 55 %
EOSINOPHIL # BLD AUTO: 0 K/UL (ref 0–0.5)
EOSINOPHIL # BLD AUTO: 0.2 K/UL (ref 0–0.5)
EOSINOPHIL NFR BLD: 0.5 % (ref 0–8)
EOSINOPHIL NFR BLD: 2.1 % (ref 0–8)
ERYTHROCYTE [DISTWIDTH] IN BLOOD BY AUTOMATED COUNT: 18.4 % (ref 11.5–14.5)
ERYTHROCYTE [DISTWIDTH] IN BLOOD BY AUTOMATED COUNT: 18.8 % (ref 11.5–14.5)
EST. GFR  (AFRICAN AMERICAN): >60 ML/MIN/1.73 M^2
EST. GFR  (NON AFRICAN AMERICAN): >60 ML/MIN/1.73 M^2
ESTIMATED AVG GLUCOSE: 103 MG/DL (ref 68–131)
FLOW: 5
FRACTIONAL SHORTENING: 24 % (ref 28–44)
GLUCOSE SERPL-MCNC: 99 MG/DL (ref 70–110)
GLUCOSE UR QL STRIP: NEGATIVE
HBA1C MFR BLD: 5.2 % (ref 4–5.6)
HCO3 UR-SCNC: 46.9 MMOL/L (ref 24–28)
HCT VFR BLD AUTO: 54.7 % (ref 40–54)
HCT VFR BLD AUTO: 55.9 % (ref 40–54)
HGB BLD-MCNC: 15.4 G/DL (ref 14–18)
HGB BLD-MCNC: 15.6 G/DL (ref 14–18)
HGB UR QL STRIP: NEGATIVE
IMM GRANULOCYTES # BLD AUTO: 0.01 K/UL (ref 0–0.04)
IMM GRANULOCYTES # BLD AUTO: 0.02 K/UL (ref 0–0.04)
IMM GRANULOCYTES NFR BLD AUTO: 0.1 % (ref 0–0.5)
IMM GRANULOCYTES NFR BLD AUTO: 0.3 % (ref 0–0.5)
INR PPP: 1.9 (ref 0.8–1.2)
INR PPP: 2 (ref 0.8–1.2)
INTERVENTRICULAR SEPTUM: 0.97 CM (ref 0.6–1.1)
KETONES UR QL STRIP: NEGATIVE
LA MAJOR: 5.39 CM
LA MINOR: 4.5 CM
LA WIDTH: 2.67 CM
LEFT ATRIUM SIZE: 2.8 CM
LEFT ATRIUM VOLUME INDEX: 12.7 ML/M2
LEFT ATRIUM VOLUME: 31.17 CM3
LEFT INTERNAL DIMENSION IN SYSTOLE: 4.02 CM (ref 2.1–4)
LEFT VENTRICLE DIASTOLIC VOLUME INDEX: 55.05 ML/M2
LEFT VENTRICLE DIASTOLIC VOLUME: 134.87 ML
LEFT VENTRICLE MASS INDEX: 75 G/M2
LEFT VENTRICLE SYSTOLIC VOLUME INDEX: 28.9 ML/M2
LEFT VENTRICLE SYSTOLIC VOLUME: 70.82 ML
LEFT VENTRICULAR INTERNAL DIMENSION IN DIASTOLE: 5.29 CM (ref 3.5–6)
LEFT VENTRICULAR MASS: 184.11 G
LEUKOCYTE ESTERASE UR QL STRIP: NEGATIVE
LYMPHOCYTES # BLD AUTO: 1 K/UL (ref 1–4.8)
LYMPHOCYTES # BLD AUTO: 1.6 K/UL (ref 1–4.8)
LYMPHOCYTES NFR BLD: 12.2 % (ref 18–48)
LYMPHOCYTES NFR BLD: 21.3 % (ref 18–48)
MAGNESIUM SERPL-MCNC: 1.6 MG/DL (ref 1.6–2.6)
MCH RBC QN AUTO: 25.5 PG (ref 27–31)
MCH RBC QN AUTO: 26 PG (ref 27–31)
MCHC RBC AUTO-ENTMCNC: 27.5 G/DL (ref 32–36)
MCHC RBC AUTO-ENTMCNC: 28.5 G/DL (ref 32–36)
MCV RBC AUTO: 91 FL (ref 82–98)
MCV RBC AUTO: 93 FL (ref 82–98)
MODE: ABNORMAL
MONOCYTES # BLD AUTO: 0.6 K/UL (ref 0.3–1)
MONOCYTES # BLD AUTO: 0.7 K/UL (ref 0.3–1)
MONOCYTES NFR BLD: 7.8 % (ref 4–15)
MONOCYTES NFR BLD: 8.9 % (ref 4–15)
NEUTROPHILS # BLD AUTO: 5.1 K/UL (ref 1.8–7.7)
NEUTROPHILS # BLD AUTO: 6.3 K/UL (ref 1.8–7.7)
NEUTROPHILS NFR BLD: 67.1 % (ref 38–73)
NEUTROPHILS NFR BLD: 78.7 % (ref 38–73)
NITRITE UR QL STRIP: NEGATIVE
NRBC BLD-RTO: 0 /100 WBC
NRBC BLD-RTO: 0 /100 WBC
PCO2 BLDA: 88.1 MMHG (ref 35–45)
PH SMN: 7.33 [PH] (ref 7.35–7.45)
PH UR STRIP: 7 [PH] (ref 5–8)
PHOSPHATE SERPL-MCNC: 3.5 MG/DL (ref 2.7–4.5)
PLATELET # BLD AUTO: 203 K/UL (ref 150–450)
PLATELET # BLD AUTO: 218 K/UL (ref 150–450)
PMV BLD AUTO: 10 FL (ref 9.2–12.9)
PMV BLD AUTO: 9.9 FL (ref 9.2–12.9)
PO2 BLDA: 28 MMHG (ref 40–60)
POC BE: 21 MMOL/L
POC SATURATED O2: 44 % (ref 95–100)
POC TCO2: 50 MMOL/L (ref 24–29)
POTASSIUM SERPL-SCNC: 3.8 MMOL/L (ref 3.5–5.1)
PROT SERPL-MCNC: 8.9 G/DL (ref 6–8.4)
PROT UR QL STRIP: NEGATIVE
PROTHROMBIN TIME: 19.3 SEC (ref 9–12.5)
PROTHROMBIN TIME: 19.5 SEC (ref 9–12.5)
RA MAJOR: 5 CM
RA PRESSURE: 8 MMHG
RA WIDTH: 3.39 CM
RBC # BLD AUTO: 5.99 M/UL (ref 4.6–6.2)
RBC # BLD AUTO: 6.04 M/UL (ref 4.6–6.2)
RIGHT VENTRICULAR END-DIASTOLIC DIMENSION: 3.77 CM
SAMPLE: ABNORMAL
SARS-COV-2 RDRP RESP QL NAA+PROBE: NEGATIVE
SINUS: 3.12 CM
SITE: ABNORMAL
SODIUM SERPL-SCNC: 137 MMOL/L (ref 136–145)
SP GR UR STRIP: 1 (ref 1–1.03)
SP02: 98
STJ: 3.1 CM
TDI LATERAL: 0.09 M/S
TDI SEPTAL: 0.07 M/S
TDI: 0.08 M/S
TRICUSPID ANNULAR PLANE SYSTOLIC EXCURSION: 2.23 CM
TROPONIN I SERPL DL<=0.01 NG/ML-MCNC: 0.01 NG/ML (ref 0–0.03)
TROPONIN I SERPL DL<=0.01 NG/ML-MCNC: 0.01 NG/ML (ref 0–0.03)
TROPONIN I SERPL DL<=0.01 NG/ML-MCNC: <0.006 NG/ML (ref 0–0.03)
TSH SERPL DL<=0.005 MIU/L-ACNC: 2.29 UIU/ML (ref 0.4–4)
URN SPEC COLLECT METH UR: NORMAL
WBC # BLD AUTO: 7.66 K/UL (ref 3.9–12.7)
WBC # BLD AUTO: 7.94 K/UL (ref 3.9–12.7)

## 2022-03-18 PROCEDURE — 86803 HEPATITIS C AB TEST: CPT | Performed by: EMERGENCY MEDICINE

## 2022-03-18 PROCEDURE — 97530 THERAPEUTIC ACTIVITIES: CPT

## 2022-03-18 PROCEDURE — 25000003 PHARM REV CODE 250: Performed by: STUDENT IN AN ORGANIZED HEALTH CARE EDUCATION/TRAINING PROGRAM

## 2022-03-18 PROCEDURE — 85025 COMPLETE CBC W/AUTO DIFF WBC: CPT | Performed by: STUDENT IN AN ORGANIZED HEALTH CARE EDUCATION/TRAINING PROGRAM

## 2022-03-18 PROCEDURE — 94640 AIRWAY INHALATION TREATMENT: CPT | Mod: HCNC

## 2022-03-18 PROCEDURE — 83735 ASSAY OF MAGNESIUM: CPT | Performed by: STUDENT IN AN ORGANIZED HEALTH CARE EDUCATION/TRAINING PROGRAM

## 2022-03-18 PROCEDURE — 99220 PR INITIAL OBSERVATION CARE,LEVL III: ICD-10-PCS | Mod: GC,,, | Performed by: STUDENT IN AN ORGANIZED HEALTH CARE EDUCATION/TRAINING PROGRAM

## 2022-03-18 PROCEDURE — 84443 ASSAY THYROID STIM HORMONE: CPT | Performed by: STUDENT IN AN ORGANIZED HEALTH CARE EDUCATION/TRAINING PROGRAM

## 2022-03-18 PROCEDURE — 85025 COMPLETE CBC W/AUTO DIFF WBC: CPT | Mod: 91 | Performed by: STUDENT IN AN ORGANIZED HEALTH CARE EDUCATION/TRAINING PROGRAM

## 2022-03-18 PROCEDURE — 27000190 HC CPAP FULL FACE MASK W/VALVE: Mod: HCNC

## 2022-03-18 PROCEDURE — 99900035 HC TECH TIME PER 15 MIN (STAT)

## 2022-03-18 PROCEDURE — 83880 ASSAY OF NATRIURETIC PEPTIDE: CPT | Performed by: STUDENT IN AN ORGANIZED HEALTH CARE EDUCATION/TRAINING PROGRAM

## 2022-03-18 PROCEDURE — 25000242 PHARM REV CODE 250 ALT 637 W/ HCPCS: Mod: HCNC | Performed by: STUDENT IN AN ORGANIZED HEALTH CARE EDUCATION/TRAINING PROGRAM

## 2022-03-18 PROCEDURE — 99900035 HC TECH TIME PER 15 MIN (STAT): Mod: HCNC

## 2022-03-18 PROCEDURE — 97161 PT EVAL LOW COMPLEX 20 MIN: CPT

## 2022-03-18 PROCEDURE — 80053 COMPREHEN METABOLIC PANEL: CPT | Performed by: STUDENT IN AN ORGANIZED HEALTH CARE EDUCATION/TRAINING PROGRAM

## 2022-03-18 PROCEDURE — 99220 PR INITIAL OBSERVATION CARE,LEVL III: CPT | Mod: GC,,, | Performed by: STUDENT IN AN ORGANIZED HEALTH CARE EDUCATION/TRAINING PROGRAM

## 2022-03-18 PROCEDURE — 36415 COLL VENOUS BLD VENIPUNCTURE: CPT | Performed by: STUDENT IN AN ORGANIZED HEALTH CARE EDUCATION/TRAINING PROGRAM

## 2022-03-18 PROCEDURE — 83036 HEMOGLOBIN GLYCOSYLATED A1C: CPT | Performed by: STUDENT IN AN ORGANIZED HEALTH CARE EDUCATION/TRAINING PROGRAM

## 2022-03-18 PROCEDURE — 81003 URINALYSIS AUTO W/O SCOPE: CPT | Performed by: STUDENT IN AN ORGANIZED HEALTH CARE EDUCATION/TRAINING PROGRAM

## 2022-03-18 PROCEDURE — 84484 ASSAY OF TROPONIN QUANT: CPT | Performed by: STUDENT IN AN ORGANIZED HEALTH CARE EDUCATION/TRAINING PROGRAM

## 2022-03-18 PROCEDURE — 84484 ASSAY OF TROPONIN QUANT: CPT | Mod: 91 | Performed by: STUDENT IN AN ORGANIZED HEALTH CARE EDUCATION/TRAINING PROGRAM

## 2022-03-18 PROCEDURE — 85610 PROTHROMBIN TIME: CPT | Performed by: EMERGENCY MEDICINE

## 2022-03-18 PROCEDURE — 87389 HIV-1 AG W/HIV-1&-2 AB AG IA: CPT | Performed by: EMERGENCY MEDICINE

## 2022-03-18 PROCEDURE — 63600175 PHARM REV CODE 636 W HCPCS: Mod: HCNC | Performed by: STUDENT IN AN ORGANIZED HEALTH CARE EDUCATION/TRAINING PROGRAM

## 2022-03-18 PROCEDURE — 94660 CPAP INITIATION&MGMT: CPT | Mod: HCNC

## 2022-03-18 PROCEDURE — 25500020 PHARM REV CODE 255: Performed by: STUDENT IN AN ORGANIZED HEALTH CARE EDUCATION/TRAINING PROGRAM

## 2022-03-18 PROCEDURE — U0002 COVID-19 LAB TEST NON-CDC: HCPCS | Mod: HCNC | Performed by: STUDENT IN AN ORGANIZED HEALTH CARE EDUCATION/TRAINING PROGRAM

## 2022-03-18 PROCEDURE — 84100 ASSAY OF PHOSPHORUS: CPT | Performed by: STUDENT IN AN ORGANIZED HEALTH CARE EDUCATION/TRAINING PROGRAM

## 2022-03-18 PROCEDURE — 85610 PROTHROMBIN TIME: CPT | Mod: 91 | Performed by: STUDENT IN AN ORGANIZED HEALTH CARE EDUCATION/TRAINING PROGRAM

## 2022-03-18 PROCEDURE — 99204 OFFICE O/P NEW MOD 45 MIN: CPT | Mod: GC,,, | Performed by: INTERNAL MEDICINE

## 2022-03-18 PROCEDURE — 20600001 HC STEP DOWN PRIVATE ROOM

## 2022-03-18 PROCEDURE — 94761 N-INVAS EAR/PLS OXIMETRY MLT: CPT | Mod: HCNC

## 2022-03-18 PROCEDURE — 27000221 HC OXYGEN, UP TO 24 HOURS: Mod: HCNC

## 2022-03-18 PROCEDURE — 99204 PR OFFICE/OUTPT VISIT, NEW, LEVL IV, 45-59 MIN: ICD-10-PCS | Mod: GC,,, | Performed by: INTERNAL MEDICINE

## 2022-03-18 PROCEDURE — 82803 BLOOD GASES ANY COMBINATION: CPT | Mod: HCNC

## 2022-03-18 RX ORDER — METOPROLOL TARTRATE 25 MG/1
25 TABLET, FILM COATED ORAL 2 TIMES DAILY
Status: DISCONTINUED | OUTPATIENT
Start: 2022-03-18 | End: 2022-03-23 | Stop reason: HOSPADM

## 2022-03-18 RX ORDER — ONDANSETRON 2 MG/ML
4 INJECTION INTRAMUSCULAR; INTRAVENOUS EVERY 8 HOURS PRN
Status: DISCONTINUED | OUTPATIENT
Start: 2022-03-18 | End: 2022-03-23 | Stop reason: HOSPADM

## 2022-03-18 RX ORDER — WARFARIN SODIUM 5 MG/1
10 TABLET ORAL ONCE
Status: COMPLETED | OUTPATIENT
Start: 2022-03-18 | End: 2022-03-18

## 2022-03-18 RX ORDER — ALBUTEROL SULFATE 90 UG/1
2 AEROSOL, METERED RESPIRATORY (INHALATION) EVERY 4 HOURS PRN
Status: DISCONTINUED | OUTPATIENT
Start: 2022-03-18 | End: 2022-03-23 | Stop reason: HOSPADM

## 2022-03-18 RX ORDER — FUROSEMIDE 10 MG/ML
80 INJECTION INTRAMUSCULAR; INTRAVENOUS
Status: DISCONTINUED | OUTPATIENT
Start: 2022-03-18 | End: 2022-03-19

## 2022-03-18 RX ORDER — ACETAMINOPHEN 325 MG/1
650 TABLET ORAL EVERY 4 HOURS PRN
Status: DISCONTINUED | OUTPATIENT
Start: 2022-03-18 | End: 2022-03-23 | Stop reason: HOSPADM

## 2022-03-18 RX ORDER — METOLAZONE 2.5 MG/1
2.5 TABLET ORAL
Status: DISCONTINUED | OUTPATIENT
Start: 2022-03-18 | End: 2022-03-23 | Stop reason: HOSPADM

## 2022-03-18 RX ORDER — TALC
6 POWDER (GRAM) TOPICAL NIGHTLY PRN
Status: DISCONTINUED | OUTPATIENT
Start: 2022-03-18 | End: 2022-03-23 | Stop reason: HOSPADM

## 2022-03-18 RX ORDER — WARFARIN SODIUM 5 MG/1
10 TABLET ORAL DAILY
Status: DISCONTINUED | OUTPATIENT
Start: 2022-03-18 | End: 2022-03-18

## 2022-03-18 RX ORDER — BOSENTAN 125 MG/1
125 TABLET, FILM COATED ORAL 2 TIMES DAILY
Status: DISCONTINUED | OUTPATIENT
Start: 2022-03-18 | End: 2022-03-23 | Stop reason: HOSPADM

## 2022-03-18 RX ORDER — WARFARIN SODIUM 5 MG/1
5 TABLET ORAL
Status: DISCONTINUED | OUTPATIENT
Start: 2022-03-20 | End: 2022-03-20

## 2022-03-18 RX ORDER — IPRATROPIUM BROMIDE AND ALBUTEROL SULFATE 2.5; .5 MG/3ML; MG/3ML
3 SOLUTION RESPIRATORY (INHALATION)
Status: COMPLETED | OUTPATIENT
Start: 2022-03-18 | End: 2022-03-18

## 2022-03-18 RX ORDER — SILDENAFIL CITRATE 20 MG/1
20 TABLET ORAL 3 TIMES DAILY
Status: DISCONTINUED | OUTPATIENT
Start: 2022-03-18 | End: 2022-03-23 | Stop reason: HOSPADM

## 2022-03-18 RX ORDER — LANOLIN ALCOHOL/MO/W.PET/CERES
400 CREAM (GRAM) TOPICAL 2 TIMES DAILY
Status: DISCONTINUED | OUTPATIENT
Start: 2022-03-18 | End: 2022-03-21

## 2022-03-18 RX ORDER — PROCHLORPERAZINE EDISYLATE 5 MG/ML
5 INJECTION INTRAMUSCULAR; INTRAVENOUS EVERY 6 HOURS PRN
Status: DISCONTINUED | OUTPATIENT
Start: 2022-03-18 | End: 2022-03-23 | Stop reason: HOSPADM

## 2022-03-18 RX ORDER — POLYETHYLENE GLYCOL 3350 17 G/17G
17 POWDER, FOR SOLUTION ORAL DAILY
Status: DISCONTINUED | OUTPATIENT
Start: 2022-03-18 | End: 2022-03-23 | Stop reason: HOSPADM

## 2022-03-18 RX ORDER — FLUTICASONE FUROATE AND VILANTEROL 100; 25 UG/1; UG/1
1 POWDER RESPIRATORY (INHALATION) DAILY
Refills: 3 | Status: DISCONTINUED | OUTPATIENT
Start: 2022-03-18 | End: 2022-03-23 | Stop reason: HOSPADM

## 2022-03-18 RX ORDER — ALLOPURINOL 300 MG/1
300 TABLET ORAL DAILY
Status: DISCONTINUED | OUTPATIENT
Start: 2022-03-18 | End: 2022-03-23 | Stop reason: HOSPADM

## 2022-03-18 RX ORDER — WARFARIN SODIUM 5 MG/1
10 TABLET ORAL
Status: DISCONTINUED | OUTPATIENT
Start: 2022-03-19 | End: 2022-03-20

## 2022-03-18 RX ORDER — WARFARIN SODIUM 5 MG/1
10 TABLET ORAL DAILY PRN
Status: DISCONTINUED | OUTPATIENT
Start: 2022-03-18 | End: 2022-03-20

## 2022-03-18 RX ORDER — ASPIRIN 325 MG
325 TABLET ORAL DAILY
Status: DISCONTINUED | OUTPATIENT
Start: 2022-03-18 | End: 2022-03-18

## 2022-03-18 RX ORDER — AMOXICILLIN 250 MG
1 CAPSULE ORAL 2 TIMES DAILY
Status: DISCONTINUED | OUTPATIENT
Start: 2022-03-18 | End: 2022-03-23 | Stop reason: HOSPADM

## 2022-03-18 RX ADMIN — SENNOSIDES AND DOCUSATE SODIUM 1 TABLET: 50; 8.6 TABLET ORAL at 08:03

## 2022-03-18 RX ADMIN — HUMAN ALBUMIN MICROSPHERES AND PERFLUTREN 0.66 MG: 10; .22 INJECTION, SOLUTION INTRAVENOUS at 09:03

## 2022-03-18 RX ADMIN — Medication 400 MG: at 08:03

## 2022-03-18 RX ADMIN — SILDENAFIL 20 MG: 20 TABLET ORAL at 06:03

## 2022-03-18 RX ADMIN — METOPROLOL TARTRATE 25 MG: 25 TABLET, FILM COATED ORAL at 08:03

## 2022-03-18 RX ADMIN — IPRATROPIUM BROMIDE AND ALBUTEROL SULFATE 3 ML: .5; 3 SOLUTION RESPIRATORY (INHALATION) at 12:03

## 2022-03-18 RX ADMIN — BOSENTAN 125 MG: 125 TABLET, FILM COATED ORAL at 08:03

## 2022-03-18 RX ADMIN — SILDENAFIL 20 MG: 20 TABLET ORAL at 08:03

## 2022-03-18 RX ADMIN — METOLAZONE 2.5 MG: 2.5 TABLET ORAL at 10:03

## 2022-03-18 RX ADMIN — FUROSEMIDE 80 MG: 10 INJECTION, SOLUTION INTRAMUSCULAR; INTRAVENOUS at 04:03

## 2022-03-18 RX ADMIN — FUROSEMIDE 80 MG: 10 INJECTION, SOLUTION INTRAMUSCULAR; INTRAVENOUS at 05:03

## 2022-03-18 RX ADMIN — ALLOPURINOL 300 MG: 300 TABLET ORAL at 08:03

## 2022-03-18 RX ADMIN — FLUTICASONE FUROATE AND VILANTEROL TRIFENATATE 1 PUFF: 100; 25 POWDER RESPIRATORY (INHALATION) at 08:03

## 2022-03-18 RX ADMIN — Medication 6 MG: at 08:03

## 2022-03-18 RX ADMIN — BOSENTAN 125 MG: 125 TABLET, FILM COATED ORAL at 02:03

## 2022-03-18 RX ADMIN — WARFARIN SODIUM 10 MG: 5 TABLET ORAL at 04:03

## 2022-03-18 NOTE — ASSESSMENT & PLAN NOTE
Patient with Paroxysmal (<7 days) atrial fibrillation which is controlled currently with Beta Blocker. Patient is currently in sinus rhythm.[id number] Score: The patient doesn't have any registry metric data available. Anticoagulation indicated. Anticoagulation done with warfarin.      Plan:  - Rate control with target HR <110  - Metoprolol tartrate vs diltiazem  - Hold for SBP <90/60 or P<45 bpm  - Patient's [id number] score is 2, annual risk of stroke is 2.9  - Score > 1 requires anticoagulation preferably with Warfarin  - HAS-BLED Score:  - Hypertension (+1)  Yes  - Impaired Renal Function (Dialysis, transplant, Cr >2.26 mg/dL) (+1)  No  - Impaired Liver Function (Cirrhosis or bilirubin >2x normal with AST/ALT/AP >3x normal) (+1)  No  - History of Stroke (+1)  No  - History of Bleeding (+1)  No  - Labile INRs (Unstable/high INRs, time in therapeutic range <60%) (+1)  No  - >65 years (+1)  No  - Drugs (Antiplatelet agents, NSAIDs) (+1)  No  - Alcohol Consumption (+1)  No    - Total Score:  1  - Risk of bleeding: 3.4%    - Score >=3 indicates high bleeding risk.  - HAS-BLED > CHADVAS indicates risk greater than benefit.  - Magnesium > 2, Potassium > 4  - Continuous telemetry  - TTE  - Cardiology consult and evaluation, recs appreciated

## 2022-03-18 NOTE — MEDICAL/APP STUDENT
History     Chief Complaint   Patient presents with    Shortness of Breath     On 3L home O2, endorses chest tightness     This is a 46 year old male with a history of CHF, pulmonary hypertension, PFO, MALLIKA, HTN, asthma presenting to the ED due to chest pain, shortness of breath, and bloating. Patient says the patient has had these symptoms for the past few months, but they have progressively worsened today. He says he was cleaning his tub today when he had worsening chest pain 8/10 in severity and shortness of breath, and had to rest for 10 minutes to catch his breath. He says the chest pain is substernal, does not radiate anywhere, and worsens with exertion. He says his shortness of breath is also worse with exertion and unchanged with position, he denies any pleuritic chest pain. He notes he used his albuterol inhaler today which helped partially relieve his symptoms, and took Tylenol at 9PM today. He says he's on 3L of oxygen at home. He also confirms feelings of bloating and abdominal distension but denies any abdominal pain. He denies fever, chills, constipation, diarrhea, reflux, cough, back pain.           Past Medical History:   Diagnosis Date    Arthritis     CHF (congestive heart failure)     Cor pulmonale 11/5/2012    Diastolic dysfunction     Gallstones     Hypertension     Left ventricular systolic dysfunction 11/5/2012    Morbid obesity 11/5/2012    Obesity     MALLIKA (obstructive sleep apnea)     PFO (patent foramen ovale) 11/5/2012    Pickwickian syndrome 11/5/2012    Pulmonary hypertension        No past surgical history on file.    Family History   Problem Relation Age of Onset    Arthritis Mother     Asthma Mother     Hypertension Father     Asthma Sister     Arthritis Maternal Grandmother     Asthma Maternal Grandmother     Diabetes Maternal Grandmother     Hypertension Maternal Grandmother     Arthritis Maternal Grandfather     Hypertension Maternal Grandfather      Hypertension Paternal Grandfather     Breast cancer Paternal Grandmother     Down syndrome Brother     Gout Brother        Social History     Tobacco Use    Smoking status: Never Smoker    Smokeless tobacco: Never Used   Substance Use Topics    Alcohol use: Yes     Comment: holidays  rare    Drug use: No       Review of Systems   Constitutional: Negative for chills, diaphoresis and fever.   HENT: Negative for congestion and rhinorrhea.    Respiratory: Negative for cough.    Cardiovascular: Positive for chest pain and leg swelling.   Gastrointestinal: Positive for abdominal distention. Negative for abdominal pain, constipation, diarrhea and vomiting.   Genitourinary: Negative for dysuria, flank pain and hematuria.   Musculoskeletal: Negative for arthralgias and joint swelling.   Skin: Negative for pallor and rash.   Neurological: Negative for weakness, light-headedness and headaches.   Psychiatric/Behavioral: Negative for agitation and confusion.       Physical Exam   /70   Pulse 107   Temp 98.8 °F (37.1 °C) (Oral)   Resp (!) 24   SpO2 95%     Physical Exam    Constitutional: He is not diaphoretic. No distress.   HENT:   Head: Normocephalic and atraumatic.   Cardiovascular: Normal rate and regular rhythm.   No murmur heard.  Pulmonary/Chest: No respiratory distress. He has wheezes.   On 6 L of oxygen   Abdominal: Abdomen is soft. He exhibits no distension. There is no abdominal tenderness.   Musculoskeletal:         General: Normal range of motion.      Comments: 1+ pitting edema bilaterally, no calf tenderness     Neurological: He is alert and oriented to person, place, and time. GCS score is 15. GCS eye subscore is 4. GCS verbal subscore is 5. GCS motor subscore is 6.   Skin: Skin is warm and dry. Capillary refill takes less than 2 seconds.   Psychiatric: He has a normal mood and affect.         ED Course     MDM: This is a 46 year old male with a history of CHF, asthma, pulmonary hypertension,  PFO, MALLIKA presenting due to worsening exertional substernal chest pain and shortness of breath today. Patient says now in the ED his symptoms have resolved. He is on 6L of oxygen here, usually on 3L at home. Vitals are otherwise stable here. He has signs of fluid ovelroad on exam as well as expiratory wheezing bilaterally.    Differential diagnoses include acute CHF exacerbation vs asthma exacerbation vs stable angina. Will obtain CBC, CMP, BNP, troponin. Will obtain INR as patient is on coumadin. VBG done here shows evidence of acute on chronic hypercapnic respiratory failure with a pH of 7.334 and PCO2 of 88. CXR done here shows evidence of pulmonary vascular congestion.     Patient's disposition is pending results of lab work, imaging, and observation of symptoms.

## 2022-03-18 NOTE — PLAN OF CARE
[facility] - Telemetry Stepdown  Initial Discharge Assessment       Primary Care Provider: Gianni Escalona MD    Admission Diagnosis: Shortness of breath [R06.02]  Hypercapnia [R06.89]  Hypoxia [R09.02]  Heart failure [I50.9]  Chest pain [R07.9]    Admission Date: 3/17/2022  Expected Discharge Date:     Discharge Barriers Identified: None    Payor: HU[employer]NA MANAGED MEDICARE / Plan: HUMANA TOTAL CARE ADVANTAGE / Product Type: Medicare Advantage /     Extended Emergency Contact Information  Primary Emergency Contact: Alphonse Mcintyre  Address: [address] [place] [address] [place]  Mobile Phone: [phone]  Relation: Father  Secondary Emergency Contact: Bernardino Mcintyre  Address: [address] [place] [address] [place] [address] [place]  Mobile Phone: [phone]  Relation: Mother    Discharge Plan A: Home with family  Discharge Plan B: Home      [facility] - [place], [place] - [address]  [address]  [facility] [address]  Phone: [phone] Fax: [phone]    [id number] - [place], [place] - [address]  [address]  [facility] [address]  Phone: [phone] Fax: [phone]    [facility] - [address] [address]  Phone: [phone] Fax: [phone]      Initial Assessment (most recent)     Adult Discharge Assessment - 03/18/22 1536        Discharge Assessment    Assessment Type Discharge Planning Assessment     Confirmed/corrected address, phone number and insurance Yes     Confirmed Demographics Correct on Facesheet     Source of Information family   pt appears to be sleeping with O2 mask at time of assessment    Reason For Admission chest pain     Lives With alone     Facility Arrived From: home     Do you expect to return to your current living situation? Yes     Do you have  help at home or someone to help you manage your care at home? Yes     Who are your caregiver(s) and their phone number(s)? father Alphonse Mcintyre [phone]     Prior to hospitilization cognitive status: Alert/Oriented     Current cognitive status: Alert/Oriented     Walking or Climbing Stairs Difficulty ambulation difficulty, requires equipment     Dressing/Bathing Difficulty bathing difficulty, requires equipment     Home Layout Able to live on 1st floor     Equipment Currently Used at Home oxygen     Who is going to help you get home at discharge? father Alphonse     How do you get to doctors appointments? family or friend will provide;car, drives self     Are you on dialysis? No     Do you take coumadin? Yes     Who monitors your labs? pt asleep, father does not know who monitors     Discharge Plan A Home with family     Discharge Plan B Home     DME Needed Upon Discharge  other (see comments)   TBD    Discharge Barriers Identified None               CM called and spoke with patient's father for DISCHARGE PLANNING ASSESSMENT. Per father, pt lives alone in a single family home with 5 steps to porch and point of entry.  Patient was independent with ADLS and DID use O2 as DME or in-home assistive equipment.  Pt is not on dialysis, DOES TAKE COUMADIN, takes medications as prescribed / keeps refilled / has resources for all daily and prescriptive needs.  Preferred pharmacy is unknown-Agreeable to bedside delivery.  Will have help from father and other immediate family upon discharge.  All questions addressed. Will continue to follow for course of hospitalization.    * Assessment completed via phone call to pt's father as pt is sleeping.  Per father, he lives 30 minutes from pt, mother lives 6-7 hours away.  Pt is on long term Coumadin therapy, father is unsure who monitors.  Pt uses home O2, father can provide transportation home at time of d/c.      Chris Sandoval RN CM  [id number]  Case Management

## 2022-03-18 NOTE — ED PROVIDER NOTES
"Encounter Date: 3/17/2022       History     Chief Complaint   Patient presents with    Shortness of Breath     On 3L home O2, endorses chest tightness     This is a 46 year old male with a history of CHF, pulmonary hypertension, PFO, MALLIKA, HTN, asthma presenting to the ED due to chest pain, shortness of breath, and bloating. Patient says the patient has had these symptoms for the past few months, but they have progressively worsened today. He says he was cleaning his tub today when he had worsening chest pain 8/10 in severity and shortness of breath, and had to rest for 10 minutes to catch his breath. He says the chest pain is substernal, does not radiate anywhere, and worsens with exertion. He says his shortness of breath is also worse with exertion and unchanged with position, he denies any pleuritic chest pain. He notes he used his albuterol inhaler today which helped partially relieve his symptoms, and took Tylenol at 9PM today. He says he's on 3L of oxygen at home. He also confirms feelings of bloating and abdominal distension but denies any abdominal pain. He denies fever, chills, constipation, diarrhea, reflux, cough, back pain.         Review of patient's allergies indicates:   Allergen Reactions    Lipitor [atorvastatin] Other (See Comments)     "it makes my legs feel like jelly"  Other reaction(s): causes legs to hurt     Past Medical History:   Diagnosis Date    Arthritis     CHF (congestive heart failure)     Cor pulmonale 11/5/2012    Diastolic dysfunction     Gallstones     Hypertension     Left ventricular systolic dysfunction 11/5/2012    Morbid obesity 11/5/2012    Obesity     MALLIKA (obstructive sleep apnea)     PFO (patent foramen ovale) 11/5/2012    Pickwickian syndrome 11/5/2012    Pulmonary hypertension      No past surgical history on file.  Family History   Problem Relation Age of Onset    Arthritis Mother     Asthma Mother     Hypertension Father     Asthma Sister     " Arthritis Maternal Grandmother     Asthma Maternal Grandmother     Diabetes Maternal Grandmother     Hypertension Maternal Grandmother     Arthritis Maternal Grandfather     Hypertension Maternal Grandfather     Hypertension Paternal Grandfather     Breast cancer Paternal Grandmother     Down syndrome Brother     Gout Brother      Social History     Tobacco Use    Smoking status: Never Smoker    Smokeless tobacco: Never Used   Substance Use Topics    Alcohol use: Yes     Comment: holidays  rare    Drug use: No     Review of Systems   Constitutional: Negative for activity change and appetite change.   HENT: Negative for congestion and drooling.    Eyes: Negative for pain and discharge.   Respiratory: Positive for chest tightness and shortness of breath.    Cardiovascular: Positive for chest pain. Negative for palpitations and leg swelling.   Gastrointestinal: Negative for abdominal distention and abdominal pain.   Endocrine: Negative for cold intolerance and heat intolerance.   Skin: Negative for color change and rash.   Neurological: Negative for dizziness and headaches.   Psychiatric/Behavioral: Negative for agitation and behavioral problems.       Physical Exam     Initial Vitals [03/17/22 2314]   BP Pulse Resp Temp SpO2   138/70 107 (!) 24 98.8 °F (37.1 °C) (!) 87 %      MAP       --         Physical Exam    Nursing note and vitals reviewed.  Constitutional: He appears well-developed and well-nourished. Nasal cannula in place.   HENT:   Head: Normocephalic and atraumatic.   Eyes: Right eye exhibits no discharge. Left eye exhibits no discharge.   Neck: Neck supple.   Normal range of motion.  Cardiovascular: Normal rate and regular rhythm.   Pulses:       Radial pulses are 2+ on the right side and 2+ on the left side.   1-2+ pitting edema to the b/l lower extremities   Pulmonary/Chest: Effort normal. He has wheezes.   Abdominal: Abdomen is soft. He exhibits no distension.   Musculoskeletal:       Cervical back: Normal range of motion and neck supple.     Neurological: He is alert and oriented to person, place, and time. GCS score is 15. GCS eye subscore is 4. GCS verbal subscore is 5. GCS motor subscore is 6.   Skin: Skin is warm and dry. Capillary refill takes less than 2 seconds.   Psychiatric: He has a normal mood and affect. Thought content normal.         ED Course   Procedures  Labs Reviewed   CBC W/ AUTO DIFFERENTIAL - Abnormal; Notable for the following components:       Result Value    Hematocrit 54.7 (*)     MCH 26.0 (*)     MCHC 28.5 (*)     RDW 18.8 (*)     Gran % 78.7 (*)     Lymph % 12.2 (*)     All other components within normal limits   COMPREHENSIVE METABOLIC PANEL - Abnormal; Notable for the following components:    Chloride 86 (*)     CO2 40 (*)     BUN 24 (*)     Total Protein 8.9 (*)     Albumin 3.1 (*)     All other components within normal limits   PROTIME-INR - Abnormal; Notable for the following components:    Prothrombin Time 19.5 (*)     INR 2.0 (*)     All other components within normal limits   ISTAT PROCEDURE - Abnormal; Notable for the following components:    POC PH 7.334 (*)     POC PCO2 88.1 (*)     POC PO2 28 (*)     POC HCO3 46.9 (*)     POC SATURATED O2 44 (*)     POC TCO2 50 (*)     All other components within normal limits   TROPONIN I   B-TYPE NATRIURETIC PEPTIDE   HIV 1 / 2 ANTIBODY   HEPATITIS C ANTIBODY   SARS-COV-2 RDRP GENE          Imaging Results          X-Ray Chest AP Portable (Final result)  Result time 03/18/22 00:29:31    Final result by Federico Lopez MD (03/18/22 00:29:31)                 Impression:      Prominence of the pulmonary vascular with bilateral pattern of interstitial and alveolar infiltrate, this may relate to pulmonary vascular congestion and edema for which clinical and historical correlation is needed.      Electronically signed by: Federico Lopez  Date:    03/18/2022  Time:    00:29             Narrative:    EXAMINATION:  XR CHEST AP  PORTABLE    CLINICAL HISTORY:  CHF;    TECHNIQUE:  Single frontal view of the chest was performed.    COMPARISON:  Chest radiograph December 7, 2019    FINDINGS:  Single portable chest view is submitted.  Heart size appears enlarged, this appears similar to prior examinations.  There is prominence of the central pulmonary vasculature, mildly greater prominent than on the prior study.  There is also a pattern suggesting interstitial and alveolar infiltrates, this may relate to a pattern of pulmonary vascular congestion and edema for which clinical and historical correlation is needed.    There is no evidence for significant pleural effusion or pneumothorax.  The osseous structures appear intact.                                 Medications   albuterol-ipratropium 2.5 mg-0.5 mg/3 mL nebulizer solution 3 mL (3 mLs Nebulization Given 3/18/22 0000)           APC / Resident Notes:   46 year old with hx of COPD, CHF, pulm htn who comes in with SOB and increased cp over his baseline. On 3L NC at baseline. Endorses worsening exercise tolerance, and having CP that is worsened by exertion. On exam he is on 5L NC, diminished lung sounds bilaterally, 1+ edema to the b/l lower extremities. Concern for CHF exacerbation vs COPD exacerbation vs pulm htn exacerbation. Will obtain a full cardioupulmonary eval to assess for the above. VBG to assess resp status. Pt protecting airway and does not need NIPPV at this time.       Attending Attestation:   Physician Attestation Statement for Resident:  As the supervising MD   Physician Attestation Statement: I have personally seen and examined this patient.   I agree with the above history. -:   As the supervising MD I agree with the above PE.    As the supervising MD I agree with the above treatment, course, plan, and disposition.   -: 46-year-old man past medical history of CHF, pulmonary hypertension on 3 L nasal cannula at home presents for shortness of breath and chest pain with  "ambulation improved with rest for the last few days.  He reports recently he is getting the symptoms were walking in his house.  He is on Lasix and metolazone reports good urine output  Denies any weight gain or lower extremity edema    Patient comfortable on his home oxygen  Bilateral wheezing  Abdomen soft nondistended nontender  Regular rate and rhythm  Plus one pitting edema bilaterally      Differential diagnosis:  CHF exacerbation versus ACS/unstable angina versus arrhythmia versus COPD/MALLIKA    EKG normal sinus rhythm with T-wave inversion in the anterior lateral leads, troponin normal.  BNP within normal limits and chest x-ray with "Prominence of the pulmonary vascular with bilateral pattern of interstitial and alveolar infiltrate, this may relate to pulmonary vascular congestion and edema for which clinical and historical correlation is needed"  VBG with hypercapnia      I have reviewed and agree with the residents interpretation of the following: lab data and x-rays.                ED Course as of 03/18/22 0227   Fri Mar 18, 2022   0219 Protime-INR(!)  therapeutic [RK]   0219 Comprehensive metabolic panel(!)  Seems to be his baseline [RK]   0220 CBC auto differential(!)  wnl [RK]   0220 Troponin I  negative [RK]   0220 Brain natriuretic peptide  wnl [RK]   0220 ISTAT PROCEDURE(!)  Worsening hypercapnia, worsened from baseline in the 50s-60s [RK]   0220 X-Ray Chest AP Portable  Prominence of the pulmonary vascular with bilateral pattern of interstitial and alveolar infiltrate, this may relate to pulmonary vascular congestion and edema for which clinical and historical correlation is needed. [RK]   0222 EKG 12-lead  NSR at a rate of 91 bpm, rightward axis, likley related to his pulm htn, no evidence of acute ischemia or infarction or acute rhs [RK]   0222 Will admit to hospital medicine [RK]      ED Course User Index  [RK] Rober Chowdhury MD             Clinical Impression:   Final diagnoses:  [R07.9] " Chest pain  [R06.02] Shortness of breath  [R06.89] Hypercapnia (Primary)  [R09.02] Hypoxia          ED Disposition Condition    Observation               Rober Chowdhury MD  Resident  03/18/22 7084       Brit Sotelo MD  03/18/22 5187

## 2022-03-18 NOTE — ED TRIAGE NOTES
Pt c/o SOB and chest pain/tightness starting yesterday afternoon. Wears 3L home O2. Pt describes abdominal discomfort and pressure that is momentarily relieved when he belches or passes flatus but quickly returns.

## 2022-03-18 NOTE — CONSULTS
[facility] - Telemetry Stepdown  Cardiology  Consult Note    Patient Name: Yong Mcintyre  MRN: [id number]  Admission Date: 3/17/2022  Hospital Length of Stay: 0 days  Code Status: Full Code   Attending Provider: Zabrina Leos MD   Consulting Provider: Alexei Ruiz MD  Primary Care Physician: Gianni Escalona MD  Principal Problem:Chest pain, rule out acute myocardial infarction    Patient information was obtained from patient, past medical records, ER records and primary team.     Inpatient consult to Cardiology  Consult performed by: Alexei Ruiz MD  Consult ordered by: Suresh Holt MD        Subjective:     Chief Complaint:  Shortness of breath    HPI:   This is a 46 year old gentleman with PFO, atrial fibrillation, chronic respiratory failure on 3L home O2, morbid obesity, HTN, MALLIKA, presents with chest pain and shortness of breath. Patient states he had substernal chest pain and waxed and waned over a couple of days. Patient also states he was short of breath even on his home O2. He denies any weight change, syncope, headaches, fevers, chills, diarrhea, or constipation.     In the ED, patient was found to have no troponin elevation, no elevation of BNP (not on entresto), EKG showed no acute changes. Echo showed normal EF without diastolic dysfunction.     Upon chart review, patient has not seen cardiology in the past. No previous ischemic workup noted. Previous echo on file on 3/2019 showed:  · Mildly decreased left ventricular systolic function. The estimated ejection fraction is 48% ( probably upper 40s to at most low 50s but lower on the auto EF)  · Mild global hypokinetic wall motion.  · Septal wall has abnormal motion.  · Grade I (mild) left ventricular diastolic dysfunction consistent with impaired relaxation.  · Normal left atrial pressure.  · Mild right ventricular enlargement.  · Normal right ventricular systolic function.  · Mild pulmonic regurgitation.  · Normal central venous pressure (3 mm Hg).  · The  "estimated PA systolic pressure is 20 mm Hg         Past Medical History:   Diagnosis Date    Arthritis     CHF (congestive heart failure)     Cor pulmonale 11/5/2012    Diastolic dysfunction     Gallstones     Hypertension     Left ventricular systolic dysfunction 11/5/2012    Morbid obesity 11/5/2012    Obesity     MALLIKA (obstructive sleep apnea)     PFO (patent foramen ovale) 11/5/2012    Pickwickian syndrome 11/5/2012    Pulmonary hypertension     Respiratory failure, acute-on-chronic 3/7/2014       No past surgical history on file.    Review of patient's allergies indicates:   Allergen Reactions    Lipitor [atorvastatin] Other (See Comments)     "it makes my legs feel like jelly"  Other reaction(s): causes legs to hurt       No current facility-administered medications on file prior to encounter.     Current Outpatient Medications on File Prior to Encounter   Medication Sig    ADVAIR DISKUS 250-50 mcg/dose diskus inhaler Inhale 1 puff into the lungs 2 (two) times daily. Controller    albuterol (VENTOLIN HFA) 90 mcg/actuation inhaler Inhale 2 puffs into the lungs every 4 (four) hours as needed for Wheezing. Rescue    allopurinoL (ZYLOPRIM) 300 MG tablet Take 1 tablet (300 mg total) by mouth once daily.    bosentan (TRACLEER) 125 MG Tab TAKE 1 TABLET BY MOUTH TWICE A DAY    furosemide (LASIX) 80 MG tablet TAKE 1 TABLET(80 MG) BY MOUTH TWICE DAILY    magnesium oxide (MAG-OX) 400 mg (241.3 mg magnesium) tablet Take 1 tablet (400 mg total) by mouth 2 (two) times daily.    metOLazone (ZAROXOLYN) 2.5 MG tablet Take 1 tablet (2.5 mg total) by mouth 3 (three) times a week.    metoprolol tartrate (LOPRESSOR) 25 MG tablet TAKE 1 TABLET BY MOUTH TWICE DAILY    multivitamin (THERAGRAN) per tablet Take by mouth.    potassium chloride (MICRO-K) 10 MEQ CpSR TAKE 1 CAPSULE(10 MEQ) BY MOUTH EVERY DAY    sildenafil (REVATIO) 20 mg Tab Take 1 tablet (20 mg total) by mouth 3 (three) times daily.    warfarin " (COUMADIN) 10 MG tablet TAKE 1 TABLET BY MOUTH EVERY DAY EXCEPT TAKE 1 1/2 TABLET EVERY THURSDAY OR AS DIRECTED    warfarin (COUMADIN) 10 MG tablet TAKE 1 TABLET BY MOUTH EVERY DAY EXCEPT 1 AND 1/2 TABLETS EVERY THURSDAY OR AS DIRECTED    warfarin (COUMADIN) 10 MG tablet TAKE 1 TABLET BY MOUTH EVERY DAY EXCEPT 1 AND 1/2 TABLETS ON THURSDAY AS DIRECTED    warfarin (COUMADIN) 10 MG tablet TAKE 1 TABLET BY MOUTH EVERY DAY EXCEPT 1 AND 1/2 TABLETS ON THURSDAY AS DIRECTED     Family History       Problem Relation (Age of Onset)    Arthritis Mother, Maternal Grandmother, Maternal Grandfather    Asthma Mother, Sister, Maternal Grandmother    Breast cancer Paternal Grandmother    Diabetes Maternal Grandmother    Down syndrome Brother    Gout Brother    Hypertension Father, Maternal Grandmother, Maternal Grandfather, Paternal Grandfather          Tobacco Use    Smoking status: Never Smoker    Smokeless tobacco: Never Used   Substance and Sexual Activity    Alcohol use: Yes     Comment: holidays  rare    Drug use: No    Sexual activity: Not Currently     Partners: Female     Review of Systems   Constitutional: Negative for chills, diaphoresis, fever, malaise/fatigue, weight gain and weight loss.   HENT:  Negative for ear pain, nosebleeds and odynophagia.    Eyes:  Negative for blurred vision, double vision, vision loss in left eye, vision loss in right eye and visual disturbance.   Cardiovascular:  Negative for chest pain, dyspnea on exertion, leg swelling, near-syncope, palpitations and syncope.   Respiratory:  Positive for shortness of breath. Negative for wheezing.    Hematologic/Lymphatic: Negative for bleeding problem. Does not bruise/bleed easily.   Skin:  Negative for poor wound healing and rash.   Musculoskeletal:  Negative for back pain, joint pain and neck pain.   Gastrointestinal:  Negative for abdominal pain, change in bowel habit, constipation, diarrhea, hematemesis, hematochezia, melena, nausea and  vomiting.   Genitourinary:  Negative for dysuria, flank pain, hematuria and pelvic pain.   Neurological:  Negative for dizziness, focal weakness, headaches, light-headedness, seizures and weakness.   Psychiatric/Behavioral:  Negative for memory loss. The patient does not have insomnia.    Objective:     Vital Signs (Most Recent):  Temp: 98.7 °F (37.1 °C) (03/18/22 1549)  Pulse: 84 (03/18/22 1549)  Resp: 18 (03/18/22 1549)  BP: 106/61 (03/18/22 1549)  SpO2: (!) 89 % (03/18/22 1549)   Vital Signs (24h Range):  Temp:  [97.5 °F (36.4 °C)-98.8 °F (37.1 °C)] 98.7 °F (37.1 °C)  Pulse:  [] 84  Resp:  [16-24] 18  SpO2:  [87 %-97 %] 89 %  BP: (106-138)/(61-75) 106/61     Weight: (!) 149.7 kg (330 lb)  Body mass index is 54.91 kg/m².    SpO2: (!) 89 %  O2 Device (Oxygen Therapy): nasal cannula w/ humidification      Intake/Output Summary (Last 24 hours) at 3/18/2022 1615  Last data filed at 3/18/2022 1549  Gross per 24 hour   Intake 1350 ml   Output 1350 ml   Net 0 ml       Lines/Drains/Airways       None                   Physical Exam  Constitutional:       General: He is not in acute distress.     Appearance: Normal appearance. He is morbidly obese. He is not ill-appearing, toxic-appearing or diaphoretic.      Interventions: Nasal cannula in place.   HENT:      Head: Normocephalic and atraumatic.      Right Ear: External ear normal.      Left Ear: External ear normal.      Nose: Nose normal.      Mouth/Throat:      Pharynx: Oropharynx is clear.   Eyes:      General: No scleral icterus.     Extraocular Movements: Extraocular movements intact.      Conjunctiva/sclera: Conjunctivae normal.   Neck:      Vascular: No carotid bruit.      Comments: Unable to appreciate JVD at 45 degrees  Cardiovascular:      Rate and Rhythm: Normal rate and regular rhythm.      Heart sounds: No murmur heard.    No friction rub. No gallop.   Pulmonary:      Effort: Pulmonary effort is normal. No respiratory distress.      Breath sounds: Rales  present. No wheezing.   Abdominal:      General: Abdomen is flat. There is no distension.      Palpations: Abdomen is soft. There is no mass.      Tenderness: There is no abdominal tenderness.   Musculoskeletal:         General: No swelling. Normal range of motion.      Cervical back: Normal range of motion.      Right lower leg: No edema.      Left lower leg: No edema.      Comments: Bilateral non pitting edema. No evidence of edema on the hip   Skin:     General: Skin is warm and dry.      Coloration: Skin is not jaundiced.      Findings: No bruising.   Neurological:      General: No focal deficit present.      Mental Status: He is alert, oriented to person, place, and time and easily aroused.       Significant Labs: CMP   Recent Labs   Lab 03/18/22  0020      K 3.8   CL 86*   CO2 40*   GLU 99   BUN 24*   CREATININE 1.3   CALCIUM 9.7   PROT 8.9*   ALBUMIN 3.1*   BILITOT 0.7   ALKPHOS 132   AST 27   ALT 21   ANIONGAP 11   ESTGFRAFRICA >60.0   EGFRNONAA >60.0   , CBC   Recent Labs   Lab 03/18/22  0020 03/18/22  0605   WBC 7.94 7.66   HGB 15.6 15.4   HCT 54.7* 55.9*    218   , Troponin   Recent Labs   Lab 03/18/22  0020 03/18/22  0605   TROPONINI 0.009 0.008  <0.006   , and All pertinent lab results from the last 24 hours have been reviewed.    Significant Imaging: Echocardiogram: Transthoracic echo (TTE) complete ([facility] Only):   Results for orders placed or performed during the hospital encounter of 03/17/22   Echo   Result Value Ref Range    BSA 2.62 m2    TDI SEPTAL 0.07 m/s    LA WIDTH 2.67 cm    TDI LATERAL 0.09 m/s    LVIDd 5.29 3.5 - 6.0 cm    IVS 0.97 0.6 - 1.1 cm    Posterior Wall 0.91 0.6 - 1.1 cm    LVIDs 4.02 (A) 2.1 - 4.0 cm    FS 24 28 - 44 %    LA volume 31.17 cm3    Sinus 3.12 cm    STJ 3.10 cm    Ascending aorta 2.80 cm    LV mass 184.11 g    LA size 2.80 cm    RVDD 3.77 cm    TAPSE 2.23 cm    Left Ventricle Relative Wall Thickness 0.34 cm    AV mean gradient 2 mmHg    AV valve area  3.49 cm2    AV Velocity Ratio 0.80     AV index (prosthetic) 1.07     Mean e' 0.08 m/s    LVOT diameter 2.04 cm    LVOT area 3.3 cm2    LVOT peak miriam 0.69 m/s    LVOT peak VTI 13.62 cm    Ao peak miriam 0.86 m/s    Ao VTI 12.75 cm    LVOT stroke volume 44.49 cm3    AV peak gradient 3 mmHg    LV Systolic Volume 70.82 mL    LV Systolic Volume Index 28.9 mL/m2    LV Diastolic Volume 134.87 mL    LV Diastolic Volume Index 55.05 mL/m2    LA Volume Index 12.7 mL/m2    LV Mass Index 75 g/m2    RA Major Axis 5.00 cm    Left Atrium Minor Axis 4.50 cm    Left Atrium Major Axis 5.39 cm    RA Width 3.39 cm    Right Atrial Pressure (from IVC) 8 mmHg    EF 55 %    Narrative    · Technically challenging study.  · The left ventricle is normal in size with normal systolic function. The   estimated ejection fraction is 55%.  · The right ventricle is not well visualized.  · Normal left ventricular diastolic function.  · Intermediate central venous pressure (8 mmHg).       and EKG: normal sinus rhythm with rightward axis    Assessment and Plan:     * Chest pain, rule out acute myocardial infarction  46 yoM with MALLIKA, chronic resp failure on 3L home O2 , AFib who presented with chest pain and acute on chronic respiratory failure. EKG showed no acute changes, troponin and BNP were negative. Echo showed preserved EF with no diastolic dysfunction.     Recommendations:  - no further cardiac workup recommended at this time  - can consider ischemic evaluation with stress test outpatient        VTE Risk Mitigation (From admission, onward)         Ordered     warfarin (COUMADIN) tablet 5 mg  Once per day on Sun Mon Wed Fri 03/18/22 1122     warfarin (COUMADIN) tablet 10 mg  Once per day on Tue Thu Sat         03/18/22 1123     warfarin (COUMADIN) tablet 10 mg  Once         03/18/22 1124     warfarin (COUMADIN) tablet 10 mg  Daily PRN         03/18/22 0320     IP VTE HIGH RISK PATIENT  Once         03/18/22 0318     Place sequential  compression device  Until discontinued         03/18/22 0318     Reason for No Pharmacological VTE Prophylaxis  Once        Question:  Reasons:  Answer:  Already adequately anticoagulated on oral Anticoagulants    03/18/22 0318                Thank you for your consult. I will sign off. Please contact us if you have any additional questions.    Alexei Ruiz MD  Cardiology   [facility] - Telemetry Step[facility]

## 2022-03-18 NOTE — ASSESSMENT & PLAN NOTE
Essential.     Plan:  - metoprolol 25 BID  - Target /80 unless otherwise indicated  - Lifestyle modifications (DASH diet, Mediterranean diet, weight loss with target BMI 18-25, at least 150 minute moderate intensity exercise/week)  - Risk factor modification (smoking cessation, HbA1C < 6.5, Minimize alcohol intake with no more than 1-2 glasses of beer or wine per day or 10 or less drinks per week)  - Statin therapy as indicated by The 10-year ASCVD risk score (Tiff MENDIOLA Jr., et al., 2013) is: 7.8%    Values used to calculate the score:      Age: 46 years      Sex: Male      Is Non- : Yes      Diabetic: No      Tobacco smoker: No      Systolic Blood Pressure: 138 mmHg      Is BP treated: Yes      HDL Cholesterol: 50 mg/dL      Total Cholesterol: 162 mg/dL  - Target LDL < 130, HDL > 40  - Magnesium > 2, Potassium > 4

## 2022-03-18 NOTE — HPI
Patient is a 46-year-old male with past medical history significant for PFO, cor pulmonale, Pickwickian syndrome, pulmonary hypertension, obese hypoventilation syndrome, morbid obesity with BMI between 50 and 60, congestive heart failure, long-term use of anticoagulation, high blood pressure who is presenting with 24 hour history of chest pain and shortness of breath.  Patient states that he has been having worsening substernal chest pressure throughout the day.  It does not appear to be exertional. It waxes and wanes with episodes lasting 15-25 minutes patient is currently asymptomatic at this time.  Nothing relieves the pain and it does not appear to be positional.  A VBG was done on the patient which noted a pCO2 of 88 on the patient's home oxygen.  He has a history of chronic hypoxic respiratory failure and requires 3 L of oxygen around the clock.  Additionally he has pulmonary hypertension and boseten and sildenafil.  The patient was admitted for observation and to evaluate for highly suspicious chest pain.    Patient was diagnosed with clinically significant PFO in 2006.  He underwent attempts via the neck to repair the hole which were ultimately unsuccessful.  The patient has developed atrial fibrillation and this required long-term anticoagulation with warfarin.  The VBG on admission shows a pH of 7.336 with the CO2 of 88 indicating a large component of chronic hypercapnia.  The patient is hemodynamically stable and on his home oxygen.  His SpO2 on 3 L fluctuates between 89 and 95%.  He is currently asymptomatic and appears comfortable.

## 2022-03-18 NOTE — HPI
This is a 46 year old gentleman with PFO, atrial fibrillation, chronic respiratory failure on 3L home O2, morbid obesity, HTN, MALLIKA, presents with chest pain and shortness of breath. Patient states he had substernal chest pain and waxed and waned over a couple of days. Patient also states he was short of breath even on his home O2. He denies any weight change, syncope, headaches, fevers, chills, diarrhea, or constipation.     In the ED, patient was found to have no troponin elevation, no elevation of BNP (not on entresto), EKG showed no acute changes. Echo showed normal EF without diastolic dysfunction.     Upon chart review, patient has not seen cardiology in the past. No previous ischemic workup noted. Previous echo on file on 3/2019 showed:  Mildly decreased left ventricular systolic function. The estimated ejection fraction is 48% ( probably upper 40s to at most low 50s but lower on the auto EF)  Mild global hypokinetic wall motion.  Septal wall has abnormal motion.  Grade I (mild) left ventricular diastolic dysfunction consistent with impaired relaxation.  Normal left atrial pressure.  Mild right ventricular enlargement.  Normal right ventricular systolic function.  Mild pulmonic regurgitation.  Normal central venous pressure (3 mm Hg).  The estimated PA systolic pressure is 20 mm Hg

## 2022-03-18 NOTE — SUBJECTIVE & OBJECTIVE
"Past Medical History:   Diagnosis Date    Arthritis     CHF (congestive heart failure)     Cor pulmonale 11/5/2012    Diastolic dysfunction     Gallstones     Hypertension     Left ventricular systolic dysfunction 11/5/2012    Morbid obesity 11/5/2012    Obesity     MALLIKA (obstructive sleep apnea)     PFO (patent foramen ovale) 11/5/2012    Pickwickian syndrome 11/5/2012    Pulmonary hypertension     Respiratory failure, acute-on-chronic 3/7/2014       No past surgical history on file.    Review of patient's allergies indicates:   Allergen Reactions    Lipitor [atorvastatin] Other (See Comments)     "it makes my legs feel like jelly"  Other reaction(s): causes legs to hurt       No current facility-administered medications on file prior to encounter.     Current Outpatient Medications on File Prior to Encounter   Medication Sig    ADVAIR DISKUS 250-50 mcg/dose diskus inhaler Inhale 1 puff into the lungs 2 (two) times daily. Controller    albuterol (VENTOLIN HFA) 90 mcg/actuation inhaler Inhale 2 puffs into the lungs every 4 (four) hours as needed for Wheezing. Rescue    allopurinoL (ZYLOPRIM) 300 MG tablet Take 1 tablet (300 mg total) by mouth once daily.    bosentan (TRACLEER) 125 MG Tab TAKE 1 TABLET BY MOUTH TWICE A DAY    furosemide (LASIX) 80 MG tablet TAKE 1 TABLET(80 MG) BY MOUTH TWICE DAILY    magnesium oxide (MAG-OX) 400 mg (241.3 mg magnesium) tablet Take 1 tablet (400 mg total) by mouth 2 (two) times daily.    metOLazone (ZAROXOLYN) 2.5 MG tablet Take 1 tablet (2.5 mg total) by mouth 3 (three) times a week.    metoprolol tartrate (LOPRESSOR) 25 MG tablet TAKE 1 TABLET BY MOUTH TWICE DAILY    multivitamin (THERAGRAN) per tablet Take by mouth.    potassium chloride (MICRO-K) 10 MEQ CpSR TAKE 1 CAPSULE(10 MEQ) BY MOUTH EVERY DAY    sildenafil (REVATIO) 20 mg Tab Take 1 tablet (20 mg total) by mouth 3 (three) times daily.    warfarin (COUMADIN) 10 MG tablet TAKE 1 TABLET BY MOUTH EVERY DAY EXCEPT TAKE 1 1/2 " TABLET EVERY THURSDAY OR AS DIRECTED    warfarin (COUMADIN) 10 MG tablet TAKE 1 TABLET BY MOUTH EVERY DAY EXCEPT 1 AND 1/2 TABLETS EVERY THURSDAY OR AS DIRECTED    warfarin (COUMADIN) 10 MG tablet TAKE 1 TABLET BY MOUTH EVERY DAY EXCEPT 1 AND 1/2 TABLETS ON THURSDAY AS DIRECTED    warfarin (COUMADIN) 10 MG tablet TAKE 1 TABLET BY MOUTH EVERY DAY EXCEPT 1 AND 1/2 TABLETS ON THURSDAY AS DIRECTED     Family History       Problem Relation (Age of Onset)    Arthritis Mother, Maternal Grandmother, Maternal Grandfather    Asthma Mother, Sister, Maternal Grandmother    Breast cancer Paternal Grandmother    Diabetes Maternal Grandmother    Down syndrome Brother    Gout Brother    Hypertension Father, Maternal Grandmother, Maternal Grandfather, Paternal Grandfather          Tobacco Use    Smoking status: Never Smoker    Smokeless tobacco: Never Used   Substance and Sexual Activity    Alcohol use: Yes     Comment: holidays  rare    Drug use: No    Sexual activity: Not Currently     Partners: Female     Review of Systems   Constitutional:  Positive for fatigue. Negative for chills and fever.   HENT:  Negative for sinus pressure and sinus pain.    Eyes:  Negative for visual disturbance.   Respiratory:  Positive for chest tightness. Negative for cough and shortness of breath.    Cardiovascular:  Positive for chest pain and leg swelling. Negative for palpitations.   Gastrointestinal:  Negative for abdominal distention, abdominal pain, constipation, diarrhea, nausea and vomiting.   Musculoskeletal:  Negative for back pain.   Skin:  Negative for rash.   Neurological:  Negative for light-headedness and headaches.   Hematological:  Does not bruise/bleed easily.   Objective:     Vital Signs (Most Recent):  Temp: 98.8 °F (37.1 °C) (03/17/22 2314)  Pulse: 91 (03/18/22 0000)  Resp: 17 (03/18/22 0000)  BP: 138/70 (03/17/22 2314)  SpO2: 97 % (03/18/22 0000) Vital Signs (24h Range):  Temp:  [98.8 °F (37.1 °C)] 98.8 °F (37.1 °C)  Pulse:   [] 91  Resp:  [17-24] 17  SpO2:  [87 %-97 %] 97 %  BP: (138)/(70) 138/70        There is no height or weight on file to calculate BMI.    Physical Exam  Vitals and nursing note reviewed.   Constitutional:       General: He is not in acute distress.     Appearance: Normal appearance. He is obese. He is not ill-appearing.   HENT:      Head: Normocephalic and atraumatic.      Mouth/Throat:      Mouth: Mucous membranes are moist.      Pharynx: Oropharynx is clear.   Eyes:      General: No scleral icterus.     Pupils: Pupils are equal, round, and reactive to light.   Neck:      Comments: Unable to assess JVD  Cardiovascular:      Rate and Rhythm: Normal rate and regular rhythm.      Pulses: Normal pulses.      Heart sounds: Murmur heard.   Pulmonary:      Effort: Pulmonary effort is normal.      Breath sounds: Normal breath sounds.   Abdominal:      General: Abdomen is flat. There is distension.      Palpations: Abdomen is soft.      Tenderness: There is no abdominal tenderness.   Musculoskeletal:         General: No tenderness.      Right lower leg: Edema (2+) present.      Left lower leg: Edema (2+) present.   Lymphadenopathy:      Cervical: No cervical adenopathy.   Skin:     General: Skin is dry.      Capillary Refill: Capillary refill takes 2 to 3 seconds.   Neurological:      General: No focal deficit present.      Mental Status: He is alert and oriented to person, place, and time.         CRANIAL NERVES     CN III, IV, VI   Pupils are equal, round, and reactive to light.     Significant Labs: All pertinent labs within the past 24 hours have been reviewed.    Significant Imaging: I have reviewed all pertinent imaging results/findings within the past 24 hours.

## 2022-03-18 NOTE — ASSESSMENT & PLAN NOTE
46 yoM with MALLIKA, chronic resp failure on 3L home O2 , AFib who presented with chest pain and acute on chronic respiratory failure. EKG showed no acute changes, troponin and BNP were negative. Echo showed preserved EF with no diastolic dysfunction.     Recommendations:  - no further cardiac workup recommended at this time  - can consider ischemic evaluation with stress test outpatient

## 2022-03-18 NOTE — CONSULTS
Pulm/CC Fellow Consult Note    Attending Physician: Zabrina Leos MD    Date of Admit: 3/17/2022    Reason for Consult:     History of Present Illness:  Yong Mcintyre is a 46 y.o.  male with a PMHx of MALLIKA on BiPAP (16/11), PFO s/p failed repair, pulmonary HTN (I?/II/III) on bosentan and sildenafil, CHF, chronic resp failure on 3L NC, a fib on coumadin who presented to the ED for CP, SOB and bloating that has been going on for several months but is now progressively worse. He has been adherent to his PH therapy and lasix and has not missed doses and wears his BiPAP every night. He feels that his leg are not more swollen compared to baseline. He describes his CP as a shortness of breath.     BNP/trop normal. CBC largely uneremarkble this AM, chemistry showing bicarb elevation that is chronic but higher than his normal high 30s. TSH was normal. CXR showed increased pulmonary vascular marking and congestion as well as a cardiac silhouette that is much larger than his prior CXR in 2019.      He last saw Dr. Head 3/2/22 and was started on a LAMA in addition to his LABA-ICS regimen. His notes indicate that the patient has PH out of proportion to his group III disease. 2/2022 2D Echo showed EF >55%, enlarged RV, PASP 41mmHg, dilated IVC. PFTs from 2/2022 show severe obstructive disease with mild restriction and moderate diffusion limitation. Serum IgE was normal at this visit as well.    Past Medical History:  Past Medical History:   Diagnosis Date    Arthritis     CHF (congestive heart failure)     Cor pulmonale 11/5/2012    Diastolic dysfunction     Gallstones     Hypertension     Left ventricular systolic dysfunction 11/5/2012    Morbid obesity 11/5/2012    Obesity     MALLIKA (obstructive sleep apnea)     PFO (patent foramen ovale) 11/5/2012    Pickwickian syndrome 11/5/2012    Pulmonary hypertension     Respiratory failure, acute-on-chronic 3/7/2014       Past Surgical History:  No past surgical  "history on file.    Allergies:  Review of patient's allergies indicates:   Allergen Reactions    Lipitor [atorvastatin] Other (See Comments)     "it makes my legs feel like jelly"  Other reaction(s): causes legs to hurt       Family History:  Family History   Problem Relation Age of Onset    Arthritis Mother     Asthma Mother     Hypertension Father     Asthma Sister     Arthritis Maternal Grandmother     Asthma Maternal Grandmother     Diabetes Maternal Grandmother     Hypertension Maternal Grandmother     Arthritis Maternal Grandfather     Hypertension Maternal Grandfather     Hypertension Paternal Grandfather     Breast cancer Paternal Grandmother     Down syndrome Brother     Gout Brother        Social History:  Social History     Tobacco Use    Smoking status: Never Smoker    Smokeless tobacco: Never Used   Substance Use Topics    Alcohol use: Yes     Comment: holidays  rare    Drug use: No       Review of Systems:  Review of Systems   Constitutional: Negative for chills, fever and weight loss.   Respiratory: Positive for shortness of breath. Negative for cough and sputum production.    Cardiovascular: Positive for chest pain. Negative for leg swelling.   Gastrointestinal: Negative for abdominal pain, diarrhea, heartburn, nausea and vomiting.   Neurological: Negative for dizziness and headaches.        Objective:   Last 24 Hour Vital Signs:  BP  Min: 130/67  Max: 138/70  Temp  Av.3 °F (36.8 °C)  Min: 98 °F (36.7 °C)  Max: 98.8 °F (37.1 °C)  Pulse  Av  Min: 88  Max: 108  Resp  Av.6  Min: 16  Max: 24  SpO2  Av.6 %  Min: 87 %  Max: 97 %  There is no height or weight on file to calculate BMI.  I/O last 3 completed shifts:  In: 350 [P.O.:350]  Out: 550 [Urine:550]    Physical Exam:  General: Alert and awake in NAD  HENT:  NCAT; anicteric sclera; (+)NC on 5L  Cardio:  Regular rate and rhythm with normal S1 and S2; no murmurs or rubs  Resp:  CTAB; respirations unlabored; no " wheezes, crackles or rhonchi  Abdom: Soft, NTND with normoactive bowel sounds  Extrem: WWP with no clubbing, cyanosis or edema  Pulses: 2+ and symmetric distally  Neuro:  AAOx3; cooperative and pleasant with no focal deficits       Assessment & Plan:     46 y.o.  male with a PMHx of MALLIKA on BiPAP (16/11), PFO s/p failed repair, pulmonary HTN (I/II/III) on bosentan and sildenafil, CHF, chronic resp failure on 3L NC, a fib on coumadin who is admitted with hypoxic respiratory failure that is acute on chronic and may represent worsening HF in the setting of PH on dual-therapy.   - continue bosentan and sildenafil for now  - continue IV lasix for diuresis with plan for net negative daily - ideally ~500cc-1L daily over the weekend, encourage fluid restriction and OOB to prevent atelectasis  - consult cards - if possible would like RHC early next week to assess L sided pressures which can help us determine if PH therapy needs to be adjusted, if this can be done, will need to hold coumadin in preparation  - goal SpO2 88-90% (he is around 90% at home on 3L)      Es Alvarado  Fellow  Pulmonary & CCM

## 2022-03-18 NOTE — SUBJECTIVE & OBJECTIVE
"Past Medical History:   Diagnosis Date    Arthritis     CHF (congestive heart failure)     Cor pulmonale 11/5/2012    Diastolic dysfunction     Gallstones     Hypertension     Left ventricular systolic dysfunction 11/5/2012    Morbid obesity 11/5/2012    Obesity     MALLIKA (obstructive sleep apnea)     PFO (patent foramen ovale) 11/5/2012    Pickwickian syndrome 11/5/2012    Pulmonary hypertension     Respiratory failure, acute-on-chronic 3/7/2014       No past surgical history on file.    Review of patient's allergies indicates:   Allergen Reactions    Lipitor [atorvastatin] Other (See Comments)     "it makes my legs feel like jelly"  Other reaction(s): causes legs to hurt       No current facility-administered medications on file prior to encounter.     Current Outpatient Medications on File Prior to Encounter   Medication Sig    ADVAIR DISKUS 250-50 mcg/dose diskus inhaler Inhale 1 puff into the lungs 2 (two) times daily. Controller    albuterol (VENTOLIN HFA) 90 mcg/actuation inhaler Inhale 2 puffs into the lungs every 4 (four) hours as needed for Wheezing. Rescue    allopurinoL (ZYLOPRIM) 300 MG tablet Take 1 tablet (300 mg total) by mouth once daily.    bosentan (TRACLEER) 125 MG Tab TAKE 1 TABLET BY MOUTH TWICE A DAY    furosemide (LASIX) 80 MG tablet TAKE 1 TABLET(80 MG) BY MOUTH TWICE DAILY    magnesium oxide (MAG-OX) 400 mg (241.3 mg magnesium) tablet Take 1 tablet (400 mg total) by mouth 2 (two) times daily.    metOLazone (ZAROXOLYN) 2.5 MG tablet Take 1 tablet (2.5 mg total) by mouth 3 (three) times a week.    metoprolol tartrate (LOPRESSOR) 25 MG tablet TAKE 1 TABLET BY MOUTH TWICE DAILY    multivitamin (THERAGRAN) per tablet Take by mouth.    potassium chloride (MICRO-K) 10 MEQ CpSR TAKE 1 CAPSULE(10 MEQ) BY MOUTH EVERY DAY    sildenafil (REVATIO) 20 mg Tab Take 1 tablet (20 mg total) by mouth 3 (three) times daily.    warfarin (COUMADIN) 10 MG tablet TAKE 1 TABLET BY MOUTH EVERY DAY EXCEPT TAKE 1 1/2 " TABLET EVERY THURSDAY OR AS DIRECTED    warfarin (COUMADIN) 10 MG tablet TAKE 1 TABLET BY MOUTH EVERY DAY EXCEPT 1 AND 1/2 TABLETS EVERY THURSDAY OR AS DIRECTED    warfarin (COUMADIN) 10 MG tablet TAKE 1 TABLET BY MOUTH EVERY DAY EXCEPT 1 AND 1/2 TABLETS ON THURSDAY AS DIRECTED    warfarin (COUMADIN) 10 MG tablet TAKE 1 TABLET BY MOUTH EVERY DAY EXCEPT 1 AND 1/2 TABLETS ON THURSDAY AS DIRECTED     Family History       Problem Relation (Age of Onset)    Arthritis Mother, Maternal Grandmother, Maternal Grandfather    Asthma Mother, Sister, Maternal Grandmother    Breast cancer Paternal Grandmother    Diabetes Maternal Grandmother    Down syndrome Brother    Gout Brother    Hypertension Father, Maternal Grandmother, Maternal Grandfather, Paternal Grandfather          Tobacco Use    Smoking status: Never Smoker    Smokeless tobacco: Never Used   Substance and Sexual Activity    Alcohol use: Yes     Comment: holidays  rare    Drug use: No    Sexual activity: Not Currently     Partners: Female     Review of Systems   Constitutional: Negative for chills, diaphoresis, fever, malaise/fatigue, weight gain and weight loss.   HENT:  Negative for ear pain, nosebleeds and odynophagia.    Eyes:  Negative for blurred vision, double vision, vision loss in left eye, vision loss in right eye and visual disturbance.   Cardiovascular:  Negative for chest pain, dyspnea on exertion, leg swelling, near-syncope, palpitations and syncope.   Respiratory:  Positive for shortness of breath. Negative for wheezing.    Hematologic/Lymphatic: Negative for bleeding problem. Does not bruise/bleed easily.   Skin:  Negative for poor wound healing and rash.   Musculoskeletal:  Negative for back pain, joint pain and neck pain.   Gastrointestinal:  Negative for abdominal pain, change in bowel habit, constipation, diarrhea, hematemesis, hematochezia, melena, nausea and vomiting.   Genitourinary:  Negative for dysuria, flank pain, hematuria and pelvic pain.    Neurological:  Negative for dizziness, focal weakness, headaches, light-headedness, seizures and weakness.   Psychiatric/Behavioral:  Negative for memory loss. The patient does not have insomnia.    Objective:     Vital Signs (Most Recent):  Temp: 98.7 °F (37.1 °C) (03/18/22 1549)  Pulse: 84 (03/18/22 1549)  Resp: 18 (03/18/22 1549)  BP: 106/61 (03/18/22 1549)  SpO2: (!) 89 % (03/18/22 1549)   Vital Signs (24h Range):  Temp:  [97.5 °F (36.4 °C)-98.8 °F (37.1 °C)] 98.7 °F (37.1 °C)  Pulse:  [] 84  Resp:  [16-24] 18  SpO2:  [87 %-97 %] 89 %  BP: (106-138)/(61-75) 106/61     Weight: (!) 149.7 kg (330 lb)  Body mass index is 54.91 kg/m².    SpO2: (!) 89 %  O2 Device (Oxygen Therapy): nasal cannula w/ humidification      Intake/Output Summary (Last 24 hours) at 3/18/2022 1615  Last data filed at 3/18/2022 1549  Gross per 24 hour   Intake 1350 ml   Output 1350 ml   Net 0 ml       Lines/Drains/Airways       None                   Physical Exam  Constitutional:       General: He is not in acute distress.     Appearance: Normal appearance. He is morbidly obese. He is not ill-appearing, toxic-appearing or diaphoretic.      Interventions: Nasal cannula in place.   HENT:      Head: Normocephalic and atraumatic.      Right Ear: External ear normal.      Left Ear: External ear normal.      Nose: Nose normal.      Mouth/Throat:      Pharynx: Oropharynx is clear.   Eyes:      General: No scleral icterus.     Extraocular Movements: Extraocular movements intact.      Conjunctiva/sclera: Conjunctivae normal.   Neck:      Vascular: No carotid bruit.      Comments: Unable to appreciate JVD at 45 degrees  Cardiovascular:      Rate and Rhythm: Normal rate and regular rhythm.      Heart sounds: No murmur heard.    No friction rub. No gallop.   Pulmonary:      Effort: Pulmonary effort is normal. No respiratory distress.      Breath sounds: Rales present. No wheezing.   Abdominal:      General: Abdomen is flat. There is no  distension.      Palpations: Abdomen is soft. There is no mass.      Tenderness: There is no abdominal tenderness.   Musculoskeletal:         General: No swelling. Normal range of motion.      Cervical back: Normal range of motion.      Right lower leg: No edema.      Left lower leg: No edema.      Comments: Bilateral non pitting edema. No evidence of edema on the hip   Skin:     General: Skin is warm and dry.      Coloration: Skin is not jaundiced.      Findings: No bruising.   Neurological:      General: No focal deficit present.      Mental Status: He is alert, oriented to person, place, and time and easily aroused.       Significant Labs: CMP   Recent Labs   Lab 03/18/22  0020      K 3.8   CL 86*   CO2 40*   GLU 99   BUN 24*   CREATININE 1.3   CALCIUM 9.7   PROT 8.9*   ALBUMIN 3.1*   BILITOT 0.7   ALKPHOS 132   AST 27   ALT 21   ANIONGAP 11   ESTGFRAFRICA >60.0   EGFRNONAA >60.0   , CBC   Recent Labs   Lab 03/18/22  0020 03/18/22  0605   WBC 7.94 7.66   HGB 15.6 15.4   HCT 54.7* 55.9*    218   , Troponin   Recent Labs   Lab 03/18/22  0020 03/18/22  0605   TROPONINI 0.009 0.008  <0.006   , and All pertinent lab results from the last 24 hours have been reviewed.    Significant Imaging: Echocardiogram: Transthoracic echo (TTE) complete ([facility] Only):   Results for orders placed or performed during the hospital encounter of 03/17/22   Echo   Result Value Ref Range    BSA 2.62 m2    TDI SEPTAL 0.07 m/s    LA WIDTH 2.67 cm    TDI LATERAL 0.09 m/s    LVIDd 5.29 3.5 - 6.0 cm    IVS 0.97 0.6 - 1.1 cm    Posterior Wall 0.91 0.6 - 1.1 cm    LVIDs 4.02 (A) 2.1 - 4.0 cm    FS 24 28 - 44 %    LA volume 31.17 cm3    Sinus 3.12 cm    STJ 3.10 cm    Ascending aorta 2.80 cm    LV mass 184.11 g    LA size 2.80 cm    RVDD 3.77 cm    TAPSE 2.23 cm    Left Ventricle Relative Wall Thickness 0.34 cm    AV mean gradient 2 mmHg    AV valve area 3.49 cm2    AV Velocity Ratio 0.80     AV index (prosthetic) 1.07     Mean e'  0.08 m/s    LVOT diameter 2.04 cm    LVOT area 3.3 cm2    LVOT peak miriam 0.69 m/s    LVOT peak VTI 13.62 cm    Ao peak miriam 0.86 m/s    Ao VTI 12.75 cm    LVOT stroke volume 44.49 cm3    AV peak gradient 3 mmHg    LV Systolic Volume 70.82 mL    LV Systolic Volume Index 28.9 mL/m2    LV Diastolic Volume 134.87 mL    LV Diastolic Volume Index 55.05 mL/m2    LA Volume Index 12.7 mL/m2    LV Mass Index 75 g/m2    RA Major Axis 5.00 cm    Left Atrium Minor Axis 4.50 cm    Left Atrium Major Axis 5.39 cm    RA Width 3.39 cm    Right Atrial Pressure (from IVC) 8 mmHg    EF 55 %    Narrative    · Technically challenging study.  · The left ventricle is normal in size with normal systolic function. The   estimated ejection fraction is 55%.  · The right ventricle is not well visualized.  · Normal left ventricular diastolic function.  · Intermediate central venous pressure (8 mmHg).       and EKG: normal sinus rhythm with rightward axis

## 2022-03-18 NOTE — ASSESSMENT & PLAN NOTE
This is the cause of the patient developing structural heart disease.  There was an attempt in 2,006 to corrected which ultimately failed

## 2022-03-18 NOTE — ASSESSMENT & PLAN NOTE
Patient is a 46-year-old male with previous structural heart disease, morbid obesity, and pulmonary hypertension coming in with high risk chest pain.  He describes it as substernal pressure which waxes and wanes.  He is currently asymptomatic.  EKG shows no changes.  Initial troponins and BNP are low however in the setting of morbid obesity this could be artificial.  He is not requiring any more oxygen than normal however of VBG revealed hypercapnia with mild respiratory acidosis overlying chronic compensated respiratory acidosis.    Plan:  - Cardiology evaluation, recs appreciate. Currently asymptomatic

## 2022-03-18 NOTE — ASSESSMENT & PLAN NOTE
Patient with Hypercapnic Respiratory failure which is Acute on chronic.  he is on home oxygen at 3 LPM. Supplemental oxygen was provided and noted-  .   Signs/symptoms of respiratory failure include- N/A. Asymptomatic. Contributing diagnoses includes - CHF, Obesity Hypoventilation and structure heart disease, pulmonary hypertension Labs and images were reviewed. Patient Has not had a recent ABG. Will treat underlying causes and adjust management of respiratory failure.

## 2022-03-18 NOTE — NURSING
Pt arrived to unit, no complaints voiced.  O2 at 9L bubble flow with SpO2 ranging in the low 90s. Skin clear, pt oriented to call light and room.  Safety measures in progress

## 2022-03-18 NOTE — ASSESSMENT & PLAN NOTE
[facility] does tell [facility] for his cardiology appointments.  He takes warfarin and most of his INRs have been well within range.  He is compliant with his medications.  The patient takes warfarin 10 mg every day except Thursday for which he takes 5 mg.       TTE 3/29/19:  · Mildly decreased left ventricular systolic function. The estimated ejection fraction is 48% ( probably upper 40s to at most low 50s but lower on the auto EF)  · Mild global hypokinetic wall motion.  · Septal wall has abnormal motion.  · Grade I (mild) left ventricular diastolic dysfunction consistent with impaired relaxation.  · Normal left atrial pressure.  · Mild right ventricular enlargement.  · Normal right ventricular systolic function.  · Mild pulmonic regurgitation.  · Normal central venous pressure (3 mm Hg).  · The estimated PA systolic pressure is 20 mm Hg          Plan:  - furosemide 80 mg BID IV  - Avoid salt intake > 2 g/day  - Strict I's/O's  - Daily standing weights  - Compression stockings as tolerate  - magnesium > 2, potassium > 4  - Goal directed medical therapy as indicated including:    - metoprolol

## 2022-03-18 NOTE — H&P
[facility] Dept  Hospital Medicine  History & Physical    Patient Name: Yong Mcintyre  MRN: [id number]  Patient Class: OP- Observation  Admission Date: 3/17/2022  Attending Physician: Zabrina Leos MD   Primary Care Provider: Gianni Escalona MD         Patient information was obtained from patient, past medical records and ER records.     Subjective:     Principal Problem:Chest pain, rule out acute myocardial infarction    Chief Complaint:   Chief Complaint   Patient presents with    Shortness of Breath     On 3L home O2, endorses chest tightness        HPI: Patient is a 46-year-old male with past medical history significant for PFO, cor pulmonale, Pickwickian syndrome, pulmonary hypertension, obese hypoventilation syndrome, morbid obesity with BMI between 50 and 60, congestive heart failure, long-term use of anticoagulation, high blood pressure who is presenting with 24 hour history of chest pain and shortness of breath.  Patient states that he has been having worsening substernal chest pressure throughout the day.  It does not appear to be exertional. It waxes and wanes with episodes lasting 15-25 minutes patient is currently asymptomatic at this time.  Nothing relieves the pain and it does not appear to be positional.  A VBG was done on the patient which noted a pCO2 of 88 on the patient's home oxygen.  He has a history of chronic hypoxic respiratory failure and requires 3 L of oxygen around the clock.  Additionally he has pulmonary hypertension and boseten and sildenafil.  The patient was admitted for observation and to evaluate for highly suspicious chest pain.    Patient was diagnosed with clinically significant PFO in 2006.  He underwent attempts via the neck to repair the hole which were ultimately unsuccessful.  The patient has developed atrial fibrillation and this required long-term anticoagulation with warfarin.  The VBG on admission shows a pH of 7.336 with the CO2 of 88 indicating a large  "component of chronic hypercapnia.  The patient is hemodynamically stable and on his home oxygen.  His SpO2 on 3 L fluctuates between 89 and 95%.  He is currently asymptomatic and appears comfortable.      Past Medical History:   Diagnosis Date    Arthritis     CHF (congestive heart failure)     Cor pulmonale 11/5/2012    Diastolic dysfunction     Gallstones     Hypertension     Left ventricular systolic dysfunction 11/5/2012    Morbid obesity 11/5/2012    Obesity     MALLIKA (obstructive sleep apnea)     PFO (patent foramen ovale) 11/5/2012    Pickwickian syndrome 11/5/2012    Pulmonary hypertension     Respiratory failure, acute-on-chronic 3/7/2014       No past surgical history on file.    Review of patient's allergies indicates:   Allergen Reactions    Lipitor [atorvastatin] Other (See Comments)     "it makes my legs feel like jelly"  Other reaction(s): causes legs to hurt       No current facility-administered medications on file prior to encounter.     Current Outpatient Medications on File Prior to Encounter   Medication Sig    ADVAIR DISKUS 250-50 mcg/dose diskus inhaler Inhale 1 puff into the lungs 2 (two) times daily. Controller    albuterol (VENTOLIN HFA) 90 mcg/actuation inhaler Inhale 2 puffs into the lungs every 4 (four) hours as needed for Wheezing. Rescue    allopurinoL (ZYLOPRIM) 300 MG tablet Take 1 tablet (300 mg total) by mouth once daily.    bosentan (TRACLEER) 125 MG Tab TAKE 1 TABLET BY MOUTH TWICE A DAY    furosemide (LASIX) 80 MG tablet TAKE 1 TABLET(80 MG) BY MOUTH TWICE DAILY    magnesium oxide (MAG-OX) 400 mg (241.3 mg magnesium) tablet Take 1 tablet (400 mg total) by mouth 2 (two) times daily.    metOLazone (ZAROXOLYN) 2.5 MG tablet Take 1 tablet (2.5 mg total) by mouth 3 (three) times a week.    metoprolol tartrate (LOPRESSOR) 25 MG tablet TAKE 1 TABLET BY MOUTH TWICE DAILY    multivitamin (THERAGRAN) per tablet Take by mouth.    potassium chloride (MICRO-K) 10 MEQ " CpSR TAKE 1 CAPSULE(10 MEQ) BY MOUTH EVERY DAY    sildenafil (REVATIO) 20 mg Tab Take 1 tablet (20 mg total) by mouth 3 (three) times daily.    warfarin (COUMADIN) 10 MG tablet TAKE 1 TABLET BY MOUTH EVERY DAY EXCEPT TAKE 1 1/2 TABLET EVERY THURSDAY OR AS DIRECTED    warfarin (COUMADIN) 10 MG tablet TAKE 1 TABLET BY MOUTH EVERY DAY EXCEPT 1 AND 1/2 TABLETS EVERY THURSDAY OR AS DIRECTED    warfarin (COUMADIN) 10 MG tablet TAKE 1 TABLET BY MOUTH EVERY DAY EXCEPT 1 AND 1/2 TABLETS ON THURSDAY AS DIRECTED    warfarin (COUMADIN) 10 MG tablet TAKE 1 TABLET BY MOUTH EVERY DAY EXCEPT 1 AND 1/2 TABLETS ON THURSDAY AS DIRECTED     Family History       Problem Relation (Age of Onset)    Arthritis Mother, Maternal Grandmother, Maternal Grandfather    Asthma Mother, Sister, Maternal Grandmother    Breast cancer Paternal Grandmother    Diabetes Maternal Grandmother    Down syndrome Brother    Gout Brother    Hypertension Father, Maternal Grandmother, Maternal Grandfather, Paternal Grandfather          Tobacco Use    Smoking status: Never Smoker    Smokeless tobacco: Never Used   Substance and Sexual Activity    Alcohol use: Yes     Comment: holidays  rare    Drug use: No    Sexual activity: Not Currently     Partners: Female     Review of Systems   Constitutional:  Positive for fatigue. Negative for chills and fever.   HENT:  Negative for sinus pressure and sinus pain.    Eyes:  Negative for visual disturbance.   Respiratory:  Positive for chest tightness. Negative for cough and shortness of breath.    Cardiovascular:  Positive for chest pain and leg swelling. Negative for palpitations.   Gastrointestinal:  Negative for abdominal distention, abdominal pain, constipation, diarrhea, nausea and vomiting.   Musculoskeletal:  Negative for back pain.   Skin:  Negative for rash.   Neurological:  Negative for light-headedness and headaches.   Hematological:  Does not bruise/bleed easily.   Objective:     Vital Signs (Most  Recent):  Temp: 98.8 °F (37.1 °C) (03/17/22 2314)  Pulse: 91 (03/18/22 0000)  Resp: 17 (03/18/22 0000)  BP: 138/70 (03/17/22 2314)  SpO2: 97 % (03/18/22 0000) Vital Signs (24h Range):  Temp:  [98.8 °F (37.1 °C)] 98.8 °F (37.1 °C)  Pulse:  [] 91  Resp:  [17-24] 17  SpO2:  [87 %-97 %] 97 %  BP: (138)/(70) 138/70        There is no height or weight on file to calculate BMI.    Physical Exam  Vitals and nursing note reviewed.   Constitutional:       General: He is not in acute distress.     Appearance: Normal appearance. He is obese. He is not ill-appearing.   HENT:      Head: Normocephalic and atraumatic.      Mouth/Throat:      Mouth: Mucous membranes are moist.      Pharynx: Oropharynx is clear.   Eyes:      General: No scleral icterus.     Pupils: Pupils are equal, round, and reactive to light.   Neck:      Comments: Unable to assess JVD  Cardiovascular:      Rate and Rhythm: Normal rate and regular rhythm.      Pulses: Normal pulses.      Heart sounds: Murmur heard.   Pulmonary:      Effort: Pulmonary effort is normal.      Breath sounds: Normal breath sounds.   Abdominal:      General: Abdomen is flat. There is distension.      Palpations: Abdomen is soft.      Tenderness: There is no abdominal tenderness.   Musculoskeletal:         General: No tenderness.      Right lower leg: Edema (2+) present.      Left lower leg: Edema (2+) present.   Lymphadenopathy:      Cervical: No cervical adenopathy.   Skin:     General: Skin is dry.      Capillary Refill: Capillary refill takes 2 to 3 seconds.   Neurological:      General: No focal deficit present.      Mental Status: He is alert and oriented to person, place, and time.         CRANIAL NERVES     CN III, IV, VI   Pupils are equal, round, and reactive to light.     Significant Labs: All pertinent labs within the past 24 hours have been reviewed.    Significant Imaging: I have reviewed all pertinent imaging results/findings within the past 24  hours.    Assessment/Plan:     * Chest pain, rule out acute myocardial infarction  Patient is a 46-year-old male with previous structural heart disease, morbid obesity, and pulmonary hypertension coming in with high risk chest pain.  He describes it as substernal pressure which waxes and wanes.  He is currently asymptomatic.  EKG shows no changes.  Initial troponins and BNP are low however in the setting of morbid obesity this could be artificial.  He is not requiring any more oxygen than normal however of VBG revealed hypercapnia with mild respiratory acidosis overlying chronic compensated respiratory acidosis.    Plan:  - Cardiology evaluation, recs appreciate. Currently asymptomatic        Paroxysmal atrial fibrillation  Patient with Paroxysmal (<7 days) atrial fibrillation which is controlled currently with Beta Blocker. Patient is currently in sinus rhythm.[id number] Score: The patient doesn't have any registry metric data available. Anticoagulation indicated. Anticoagulation done with warfarin.      Plan:  - Rate control with target HR <110  - Metoprolol tartrate vs diltiazem  - Hold for SBP <90/60 or P<45 bpm  - Patient's [id number] score is 2, annual risk of stroke is 2.9  - Score > 1 requires anticoagulation preferably with Warfarin  - HAS-BLED Score:  - Hypertension (+1)  Yes  - Impaired Renal Function (Dialysis, transplant, Cr >2.26 mg/dL) (+1)  No  - Impaired Liver Function (Cirrhosis or bilirubin >2x normal with AST/ALT/AP >3x normal) (+1)  No  - History of Stroke (+1)  No  - History of Bleeding (+1)  No  - Labile INRs (Unstable/high INRs, time in therapeutic range <60%) (+1)  No  - >65 years (+1)  No  - Drugs (Antiplatelet agents, NSAIDs) (+1)  No  - Alcohol Consumption (+1)  No    - Total Score:  1  - Risk of bleeding: 3.4%    - Score >=3 indicates high bleeding risk.  - HAS-BLED > CHADVAS indicates risk greater than benefit.  - Magnesium > 2, Potassium > 4  - Continuous telemetry  - TTE  -  Cardiology consult and evaluation, recs appreciated                Acute and chronic respiratory failure with hypercapnia  Patient with Hypercapnic Respiratory failure which is Acute on chronic.  he is on home oxygen at 3 LPM. Supplemental oxygen was provided and noted-  .   Signs/symptoms of respiratory failure include- N/A. Asymptomatic. Contributing diagnoses includes - CHF, Obesity Hypoventilation and structure heart disease, pulmonary hypertension Labs and images were reviewed. Patient Has not had a recent ABG. Will treat underlying causes and adjust management of respiratory failure.    Hypertension  Essential.     Plan:  - metoprolol 25 BID  - Target /80 unless otherwise indicated  - Lifestyle modifications (DASH diet, Mediterranean diet, weight loss with target BMI 18-25, at least 150 minute moderate intensity exercise/week)  - Risk factor modification (smoking cessation, HbA1C < 6.5, Minimize alcohol intake with no more than 1-2 glasses of beer or wine per day or 10 or less drinks per week)  - Statin therapy as indicated by The 10-year ASCVD risk score (Tiff MENDIOLA Jr., et al., 2013) is: 7.8%    Values used to calculate the score:      Age: 46 years      Sex: Male      Is Non- : Yes      Diabetic: No      Tobacco smoker: No      Systolic Blood Pressure: 138 mmHg      Is BP treated: Yes      HDL Cholesterol: 50 mg/dL      Total Cholesterol: 162 mg/dL  - Target LDL < 130, HDL > 40  - Magnesium > 2, Potassium > 4        PFO (patent foramen ovale)  This is the cause of the patient developing structural heart disease.  There was an attempt in 2,006 to corrected which ultimately failed      Pulmonary hypertension  The patient takes sildenafil and bosetren for pHTN, will continue in the hospital.      Cor pulmonale  Patient [place] [facility] [place].  He takes warfarin and most of his INRs have been well within range.  He is compliant with his medications.  The patient  takes warfarin 10 mg every day except Thursday for which he takes 5 mg.       TTE 3/29/19:  · Mildly decreased left ventricular systolic function. The estimated ejection fraction is 48% ( probably upper 40s to at most low 50s but lower on the auto EF)  · Mild global hypokinetic wall motion.  · Septal wall has abnormal motion.  · Grade I (mild) left ventricular diastolic dysfunction consistent with impaired relaxation.  · Normal left atrial pressure.  · Mild right ventricular enlargement.  · Normal right ventricular systolic function.  · Mild pulmonic regurgitation.  · Normal central venous pressure (3 mm Hg).  · The estimated PA systolic pressure is 20 mm Hg          Plan:  - furosemide 80 mg BID IV  - Avoid salt intake > 2 g/day  - Strict I's/O's  - Daily standing weights  - Compression stockings as tolerate  - magnesium > 2, potassium > 4  - Goal directed medical therapy as indicated including:    - metoprolol        Pickwickian syndrome  Morbid obesity complicates all aspects of disease management from diagnostic modalities to treatment. Weight loss encouraged and health benefits explained to patient.         Long term current use of anticoagulant therapy  Continue anticoagulation to maintain INR between 2 and 3        VTE Risk Mitigation (From admission, onward)         Ordered     warfarin (COUMADIN) tablet 10 mg  Daily         03/18/22 0318     warfarin (COUMADIN) tablet 10 mg  Daily PRN         03/18/22 0320     IP VTE HIGH RISK PATIENT  Once         03/18/22 0318     Place sequential compression device  Until discontinued         03/18/22 0318     Reason for No Pharmacological VTE Prophylaxis  Once        Question:  Reasons:  Answer:  Already adequately anticoagulated on oral Anticoagulants    03/18/22 0318                   Suresh Holt MD  Department of Hospital Medicine   [facility] Dept

## 2022-03-18 NOTE — PT/OT/SLP EVAL
"Physical Therapy Evaluation and Discharge Note    Patient Name:  Yong Mcintyre   MRN:  [id number]    Recommendations:     Discharge Recommendations:  home   Discharge Equipment Recommendations: none   Barriers to discharge: None    Assessment:     Yong Mcintyre is a 46 y.o. male admitted with a medical diagnosis of Chest pain, rule out acute myocardial infarction. Patient is independent with all functional mobility at this time and is at or near their functional baseline and is able to demonstrate independent transfers, ambulation without AD, and ADLs. Pt primarily limited by GOMEZ, however noted several pacing strategies and energy conservation techniques. As a result, patient is without further acute care PT needs at this time. Please re-consult if pt has a change in status, will sign off.    Recent Surgery: * No surgery found *      Plan:     During this hospitalization, patient does not require further acute PT services.  Please re-consult if situation changes.      Subjective     Chief Complaint: "I feel fine, it's mostly just the breathing"  Patient/Family Comments/goals: none defined  Pain/Comfort:  · Pain Rating 1: 0/10    Patients cultural, spiritual, Sikhism conflicts given the current situation: no    Living Environment: Pt lives alone in a [facility] with 5 ADY.  Prior level of function: independent without AD, driving  Support available upon discharge: family  Equipment owned: none    Objective:     Communicated with RN prior to session.  Patient found ambulatory in room/[facility] with telemetry, oxygen  upon PT entry to room.     General Precautions: Standard, fall   Orthopedic Precautions:N/A   Braces: N/A     Exams:  · RLE ROM: WFL  · RLE Strength: WFL  · LLE ROM: WFL  · LLE Strength: WFL    Functional Mobility:  · Transfers: independent sit<>stand from EOB and toilet  · Gait: Patient ambulated within room and bathroom ~50 ft total, limited by O2/HFNC tubing on wall with distant supervision. Pt with " appropriate safety awareness of O2 tubing throughout ambulation. Otherwise, demo's decreased hina, increased HEIKE, reduced foot clearance, and exaggerated trunk sway.    · Balance: intact while standing, no overt LOB noted      Therapeutic Activities and Exercises:   Education provided re: d/c planning, PT POC, safety in mobility. Pt verbalizes understanding.     Pt performed standing ADLs at sink for several minutes, toileting, hygiene, and ADLs with distant supervision. Pt able to reach items off of floor without difficulty, completed both upper and lower body dressing without assistance.    AM-PAC 6 CLICK MOBILITY  Total Score:24     Patient left sitting edge of bed with all lines intact, call button in reach and MD present.    GOALS:   Multidisciplinary Problems     Physical Therapy Goals     Not on file                History:     Past Medical History:   Diagnosis Date    Arthritis     CHF (congestive heart failure)     Cor pulmonale 11/5/2012    Diastolic dysfunction     Gallstones     Hypertension     Left ventricular systolic dysfunction 11/5/2012    Morbid obesity 11/5/2012    Obesity     MALLIKA (obstructive sleep apnea)     PFO (patent foramen ovale) 11/5/2012    Pickwickian syndrome 11/5/2012    Pulmonary hypertension     Respiratory failure, acute-on-chronic 3/7/2014       No past surgical history on file.    Time Tracking:     PT Received On: 03/18/22  PT Start Time: 0921     PT Stop Time: 0937  PT Total Time (min): 16 min     Billable Minutes: Evaluation 8 min and Therapeutic Activity 8 min      Ijeoma Bateman, PT  03/18/2022

## 2022-03-19 LAB
ALLENS TEST: ABNORMAL
ANION GAP SERPL CALC-SCNC: 12 MMOL/L (ref 8–16)
BASOPHILS # BLD AUTO: 0.04 K/UL (ref 0–0.2)
BASOPHILS NFR BLD: 0.5 % (ref 0–1.9)
BUN SERPL-MCNC: 23 MG/DL (ref 6–20)
CALCIUM SERPL-MCNC: 10.1 MG/DL (ref 8.7–10.5)
CHLORIDE SERPL-SCNC: 85 MMOL/L (ref 95–110)
CO2 SERPL-SCNC: 42 MMOL/L (ref 23–29)
CREAT SERPL-MCNC: 1.1 MG/DL (ref 0.5–1.4)
DELSYS: ABNORMAL
DIFFERENTIAL METHOD: ABNORMAL
EOSINOPHIL # BLD AUTO: 0.1 K/UL (ref 0–0.5)
EOSINOPHIL NFR BLD: 1.4 % (ref 0–8)
ERYTHROCYTE [DISTWIDTH] IN BLOOD BY AUTOMATED COUNT: 18.1 % (ref 11.5–14.5)
EST. GFR  (AFRICAN AMERICAN): >60 ML/MIN/1.73 M^2
EST. GFR  (NON AFRICAN AMERICAN): >60 ML/MIN/1.73 M^2
FLOW: 6
GLUCOSE SERPL-MCNC: 84 MG/DL (ref 70–110)
HCO3 UR-SCNC: 46.7 MMOL/L (ref 24–28)
HCT VFR BLD AUTO: 55.1 % (ref 40–54)
HGB BLD-MCNC: 15.5 G/DL (ref 14–18)
IMM GRANULOCYTES # BLD AUTO: 0.01 K/UL (ref 0–0.04)
IMM GRANULOCYTES NFR BLD AUTO: 0.1 % (ref 0–0.5)
INR PPP: 2.2 (ref 0.8–1.2)
LYMPHOCYTES # BLD AUTO: 1.4 K/UL (ref 1–4.8)
LYMPHOCYTES NFR BLD: 17.9 % (ref 18–48)
MAGNESIUM SERPL-MCNC: 1.5 MG/DL (ref 1.6–2.6)
MCH RBC QN AUTO: 25.7 PG (ref 27–31)
MCHC RBC AUTO-ENTMCNC: 28.1 G/DL (ref 32–36)
MCV RBC AUTO: 91 FL (ref 82–98)
MODE: ABNORMAL
MONOCYTES # BLD AUTO: 0.7 K/UL (ref 0.3–1)
MONOCYTES NFR BLD: 9 % (ref 4–15)
NEUTROPHILS # BLD AUTO: 5.5 K/UL (ref 1.8–7.7)
NEUTROPHILS NFR BLD: 71.1 % (ref 38–73)
NRBC BLD-RTO: 0 /100 WBC
PCO2 BLDA: 80.1 MMHG (ref 35–45)
PH SMN: 7.37 [PH] (ref 7.35–7.45)
PHOSPHATE SERPL-MCNC: 3.6 MG/DL (ref 2.7–4.5)
PLATELET # BLD AUTO: 206 K/UL (ref 150–450)
PMV BLD AUTO: 10.3 FL (ref 9.2–12.9)
PO2 BLDA: 63 MMHG (ref 80–100)
POC BE: 22 MMOL/L
POC SATURATED O2: 89 % (ref 95–100)
POC TCO2: 49 MMOL/L (ref 23–27)
POTASSIUM SERPL-SCNC: 3.6 MMOL/L (ref 3.5–5.1)
PROTHROMBIN TIME: 22.1 SEC (ref 9–12.5)
RBC # BLD AUTO: 6.04 M/UL (ref 4.6–6.2)
SAMPLE: ABNORMAL
SITE: ABNORMAL
SODIUM SERPL-SCNC: 139 MMOL/L (ref 136–145)
SP02: 92
WBC # BLD AUTO: 7.69 K/UL (ref 3.9–12.7)

## 2022-03-19 PROCEDURE — 63600175 PHARM REV CODE 636 W HCPCS

## 2022-03-19 PROCEDURE — 94760 N-INVAS EAR/PLS OXIMETRY 1: CPT

## 2022-03-19 PROCEDURE — 20600001 HC STEP DOWN PRIVATE ROOM

## 2022-03-19 PROCEDURE — 99232 SBSQ HOSP IP/OBS MODERATE 35: CPT | Mod: GC,,, | Performed by: STUDENT IN AN ORGANIZED HEALTH CARE EDUCATION/TRAINING PROGRAM

## 2022-03-19 PROCEDURE — 36415 COLL VENOUS BLD VENIPUNCTURE: CPT | Performed by: STUDENT IN AN ORGANIZED HEALTH CARE EDUCATION/TRAINING PROGRAM

## 2022-03-19 PROCEDURE — 99900035 HC TECH TIME PER 15 MIN (STAT)

## 2022-03-19 PROCEDURE — 94761 N-INVAS EAR/PLS OXIMETRY MLT: CPT

## 2022-03-19 PROCEDURE — 36415 COLL VENOUS BLD VENIPUNCTURE: CPT

## 2022-03-19 PROCEDURE — 36600 WITHDRAWAL OF ARTERIAL BLOOD: CPT

## 2022-03-19 PROCEDURE — 84100 ASSAY OF PHOSPHORUS: CPT | Performed by: STUDENT IN AN ORGANIZED HEALTH CARE EDUCATION/TRAINING PROGRAM

## 2022-03-19 PROCEDURE — 82803 BLOOD GASES ANY COMBINATION: CPT

## 2022-03-19 PROCEDURE — 85610 PROTHROMBIN TIME: CPT | Performed by: STUDENT IN AN ORGANIZED HEALTH CARE EDUCATION/TRAINING PROGRAM

## 2022-03-19 PROCEDURE — 63600175 PHARM REV CODE 636 W HCPCS: Performed by: STUDENT IN AN ORGANIZED HEALTH CARE EDUCATION/TRAINING PROGRAM

## 2022-03-19 PROCEDURE — 25000003 PHARM REV CODE 250: Performed by: STUDENT IN AN ORGANIZED HEALTH CARE EDUCATION/TRAINING PROGRAM

## 2022-03-19 PROCEDURE — 85025 COMPLETE CBC W/AUTO DIFF WBC: CPT

## 2022-03-19 PROCEDURE — 27100171 HC OXYGEN HIGH FLOW UP TO 24 HOURS

## 2022-03-19 PROCEDURE — 83735 ASSAY OF MAGNESIUM: CPT | Performed by: STUDENT IN AN ORGANIZED HEALTH CARE EDUCATION/TRAINING PROGRAM

## 2022-03-19 PROCEDURE — 80048 BASIC METABOLIC PNL TOTAL CA: CPT | Performed by: STUDENT IN AN ORGANIZED HEALTH CARE EDUCATION/TRAINING PROGRAM

## 2022-03-19 PROCEDURE — 99232 PR SUBSEQUENT HOSPITAL CARE,LEVL II: ICD-10-PCS | Mod: GC,,, | Performed by: STUDENT IN AN ORGANIZED HEALTH CARE EDUCATION/TRAINING PROGRAM

## 2022-03-19 PROCEDURE — 94660 CPAP INITIATION&MGMT: CPT

## 2022-03-19 RX ORDER — FUROSEMIDE 10 MG/ML
120 INJECTION INTRAMUSCULAR; INTRAVENOUS
Status: DISCONTINUED | OUTPATIENT
Start: 2022-03-19 | End: 2022-03-20

## 2022-03-19 RX ADMIN — POLYETHYLENE GLYCOL 3350 17 G: 17 POWDER, FOR SOLUTION ORAL at 09:03

## 2022-03-19 RX ADMIN — METOPROLOL TARTRATE 25 MG: 25 TABLET, FILM COATED ORAL at 09:03

## 2022-03-19 RX ADMIN — FUROSEMIDE 80 MG: 10 INJECTION, SOLUTION INTRAMUSCULAR; INTRAVENOUS at 04:03

## 2022-03-19 RX ADMIN — BOSENTAN 125 MG: 125 TABLET, FILM COATED ORAL at 09:03

## 2022-03-19 RX ADMIN — SILDENAFIL 20 MG: 20 TABLET ORAL at 04:03

## 2022-03-19 RX ADMIN — Medication 400 MG: at 09:03

## 2022-03-19 RX ADMIN — WARFARIN SODIUM 10 MG: 5 TABLET ORAL at 04:03

## 2022-03-19 RX ADMIN — FLUTICASONE FUROATE AND VILANTEROL TRIFENATATE 1 PUFF: 100; 25 POWDER RESPIRATORY (INHALATION) at 09:03

## 2022-03-19 RX ADMIN — SILDENAFIL 20 MG: 20 TABLET ORAL at 09:03

## 2022-03-19 RX ADMIN — FUROSEMIDE 120 MG: 10 INJECTION, SOLUTION INTRAMUSCULAR; INTRAVENOUS at 04:03

## 2022-03-19 RX ADMIN — ALLOPURINOL 300 MG: 300 TABLET ORAL at 09:03

## 2022-03-19 RX ADMIN — Medication 6 MG: at 09:03

## 2022-03-19 RX ADMIN — SENNOSIDES AND DOCUSATE SODIUM 1 TABLET: 50; 8.6 TABLET ORAL at 09:03

## 2022-03-19 NOTE — PLAN OF CARE
Problem: Adult Inpatient Plan of Care  Goal: Plan of Care Review  3/19/2022 1049 by Jordyn Watkins RN  Outcome: Ongoing, Progressing  3/19/2022 1047 by Jordyn Watkins RN  Outcome: Ongoing, Progressing  Goal: Patient-Specific Goal (Individualized)  3/19/2022 1049 by Jordyn Watkins RN  Outcome: Ongoing, Progressing  3/19/2022 1047 by Jordyn Watkins RN  Outcome: Ongoing, Progressing  Goal: Absence of Hospital-Acquired Illness or Injury  3/19/2022 1049 by Jordyn Watkins RN  Outcome: Ongoing, Progressing  3/19/2022 1047 by Jordyn Watkins RN  Outcome: Ongoing, Progressing  Goal: Optimal Comfort and Wellbeing  3/19/2022 1049 by Jordyn Watkins RN  Outcome: Ongoing, Progressing  3/19/2022 1047 by Jordyn Watkins RN  Outcome: Ongoing, Progressing  Goal: Readiness for Transition of Care  3/19/2022 1049 by Jordyn Watkins RN  Outcome: Ongoing, Progressing  3/19/2022 1047 by Jordyn Watkins RN  Outcome: Ongoing, Progressing     Problem: Bariatric Environmental Safety  Goal: Safety Maintained with Care  3/19/2022 1049 by Jordyn Watkins RN  Outcome: Ongoing, Progressing  3/19/2022 1047 by Jordyn Watkins RN  Outcome: Ongoing, Progressing

## 2022-03-19 NOTE — ASSESSMENT & PLAN NOTE
This is the cause of the patient developing structural heart disease.  There was an attempt in 2006 to corrected which ultimately failed

## 2022-03-19 NOTE — ASSESSMENT & PLAN NOTE
The patient takes sildenafil and bosetren for pHTN, will continue in the hospital.    Plan:  - After sufficient diuresis, will plan to consult [facility] for RHC to understand etiology of PAH.

## 2022-03-19 NOTE — SUBJECTIVE & OBJECTIVE
Interval History: Pt comfortable in room on 5 liters NC. Was uncomfortable wearing BiPAP overnight. Continuing IV diuresis.     Review of Systems   Constitutional:  Positive for fatigue. Negative for chills and fever.   HENT:  Negative for sinus pressure and sinus pain.    Eyes:  Negative for visual disturbance.   Respiratory:  Positive for chest tightness and shortness of breath. Negative for cough.    Cardiovascular:  Positive for leg swelling. Negative for chest pain and palpitations.   Gastrointestinal:  Positive for abdominal distention. Negative for abdominal pain, constipation, diarrhea, nausea and vomiting.   Genitourinary:  Negative for difficulty urinating and dysuria.   Musculoskeletal:  Negative for back pain.   Skin:  Negative for rash.   Neurological:  Negative for light-headedness and headaches.   Hematological:  Does not bruise/bleed easily.   Psychiatric/Behavioral:  Negative for agitation and behavioral problems.    Objective:     Vital Signs (Most Recent):  Temp: 98.5 °F (36.9 °C) (03/19/22 1130)  Pulse: 95 (03/19/22 1341)  Resp: 18 (03/19/22 1130)  BP: (!) 124/55 (03/19/22 1130)  SpO2: (!) 91 % (03/19/22 1130)   Vital Signs (24h Range):  Temp:  [97.9 °F (36.6 °C)-99.4 °F (37.4 °C)] 98.5 °F (36.9 °C)  Pulse:  [82-95] 95  Resp:  [15-20] 18  SpO2:  [88 %-100 %] 91 %  BP: (106-136)/(55-74) 124/55     Weight: (!) 149.7 kg (330 lb)  Body mass index is 54.91 kg/m².    Intake/Output Summary (Last 24 hours) at 3/19/2022 1352  Last data filed at 3/18/2022 2000  Gross per 24 hour   Intake 600 ml   Output 825 ml   Net -225 ml      Physical Exam  Vitals and nursing note reviewed.   Constitutional:       General: He is not in acute distress.     Appearance: Normal appearance. He is obese. He is not ill-appearing.   HENT:      Head: Normocephalic and atraumatic.      Mouth/Throat:      Mouth: Mucous membranes are moist.      Pharynx: Oropharynx is clear.   Eyes:      General: No scleral icterus.     Pupils:  Pupils are equal, round, and reactive to light.   Neck:      Comments: Unable to assess JVD  Cardiovascular:      Rate and Rhythm: Normal rate and regular rhythm.      Pulses: Normal pulses.      Heart sounds: Murmur heard.   Pulmonary:      Effort: Pulmonary effort is normal.      Breath sounds: Wheezing and rales present.   Abdominal:      General: Abdomen is flat. There is distension.      Palpations: Abdomen is soft.      Tenderness: There is no abdominal tenderness.   Musculoskeletal:         General: No tenderness.      Right lower leg: Edema (2+) present.      Left lower leg: Edema (2+) present.   Lymphadenopathy:      Cervical: No cervical adenopathy.   Skin:     General: Skin is dry.      Capillary Refill: Capillary refill takes 2 to 3 seconds.   Neurological:      General: No focal deficit present.      Mental Status: He is alert and oriented to person, place, and time.   Psychiatric:         Mood and Affect: Mood normal.         Behavior: Behavior normal.       Significant Labs: All pertinent labs within the past 24 hours have been reviewed.  CBC:   Recent Labs   Lab 03/18/22  0020 03/18/22  0605 03/19/22  0828   WBC 7.94 7.66 7.69   HGB 15.6 15.4 15.5   HCT 54.7* 55.9* 55.1*    218 206     CMP:   Recent Labs   Lab 03/18/22  0020 03/19/22  0553    139   K 3.8 3.6   CL 86* 85*   CO2 40* 42*   GLU 99 84   BUN 24* 23*   CREATININE 1.3 1.1   CALCIUM 9.7 10.1   PROT 8.9*  --    ALBUMIN 3.1*  --    BILITOT 0.7  --    ALKPHOS 132  --    AST 27  --    ALT 21  --    ANIONGAP 11 12   EGFRNONAA >60.0 >60.0       Significant Imaging: I have reviewed all pertinent imaging results/findings within the past 24 hours.

## 2022-03-19 NOTE — ASSESSMENT & PLAN NOTE
Patient with Hypercapnic Respiratory failure which is Acute on chronic.  he is on home oxygen at 3 LPM. Supplemental oxygen was provided and noted- Oxygen Concentration (%):  [60] 60.   Signs/symptoms of respiratory failure include- N/A. Asymptomatic. Contributing diagnoses includes - CHF, Obesity Hypoventilation and structure heart disease, pulmonary hypertension Labs and images were reviewed. Patient Has not had a recent ABG. Will treat underlying causes and adjust management of respiratory failure.    Plan:  - 120 IV Lasix BID  - RHC after adequate diuresis  - CMP daily

## 2022-03-19 NOTE — ASSESSMENT & PLAN NOTE
Patient is a 46-year-old male with previous structural heart disease, morbid obesity, and pulmonary hypertension coming in with high risk chest pain.  He describes it as substernal pressure which waxes and wanes.  He is currently asymptomatic.  EKG shows no changes.  Initial troponins and BNP are low however in the setting of morbid obesity this could be artificial.  He is not requiring any more oxygen than normal however of VBG revealed hypercapnia with mild respiratory acidosis overlying chronic compensated respiratory acidosis.

## 2022-03-19 NOTE — PROGRESS NOTES
[facility] - Telemetry [facility] Medicine  Progress Note    Patient Name: Yong Mcintyre  MRN: [id number]  Patient Class: IP- Inpatient   Admission Date: 3/17/2022  Length of Stay: 1 days  Attending Physician: Zabrina Leos MD  Primary Care Provider: Gianni Escalona MD        Subjective:     Principal Problem:Acute and chronic respiratory failure with hypercapnia        HPI:  Patient is a 46-year-old male with past medical history significant for PFO, cor pulmonale, Pickwickian syndrome, pulmonary hypertension, obese hypoventilation syndrome, morbid obesity with BMI between 50 and 60, congestive heart failure, long-term use of anticoagulation, high blood pressure who is presenting with 24 hour history of chest pain and shortness of breath.  Patient states that he has been having worsening substernal chest pressure throughout the day.  It does not appear to be exertional. It waxes and wanes with episodes lasting 15-25 minutes patient is currently asymptomatic at this time.  Nothing relieves the pain and it does not appear to be positional.  A VBG was done on the patient which noted a pCO2 of 88 on the patient's home oxygen.  He has a history of chronic hypoxic respiratory failure and requires 3 L of oxygen around the clock.  Additionally he has pulmonary hypertension and boseten and sildenafil.  The patient was admitted for observation and to evaluate for highly suspicious chest pain.    Patient was diagnosed with clinically significant PFO in 2006.  He underwent attempts via the neck to repair the hole which were ultimately unsuccessful.  The patient has developed atrial fibrillation and this required long-term anticoagulation with warfarin.  The VBG on admission shows a pH of 7.336 with the CO2 of 88 indicating a large component of chronic hypercapnia.  The patient is hemodynamically stable and on his home oxygen.  His SpO2 on 3 L fluctuates between 89 and 95%.  He is currently asymptomatic and appears  comfortable.      Overview/Hospital Course:  No notes on file    Interval History: Pt comfortable in room on 5 liters NC. Was uncomfortable wearing BiPAP overnight. Continuing IV diuresis.     Review of Systems   Constitutional:  Positive for fatigue. Negative for chills and fever.   HENT:  Negative for sinus pressure and sinus pain.    Eyes:  Negative for visual disturbance.   Respiratory:  Positive for chest tightness and shortness of breath. Negative for cough.    Cardiovascular:  Positive for leg swelling. Negative for chest pain and palpitations.   Gastrointestinal:  Positive for abdominal distention. Negative for abdominal pain, constipation, diarrhea, nausea and vomiting.   Genitourinary:  Negative for difficulty urinating and dysuria.   Musculoskeletal:  Negative for back pain.   Skin:  Negative for rash.   Neurological:  Negative for light-headedness and headaches.   Hematological:  Does not bruise/bleed easily.   Psychiatric/Behavioral:  Negative for agitation and behavioral problems.    Objective:     Vital Signs (Most Recent):  Temp: 98.5 °F (36.9 °C) (03/19/22 1130)  Pulse: 95 (03/19/22 1341)  Resp: 18 (03/19/22 1130)  BP: (!) 124/55 (03/19/22 1130)  SpO2: (!) 91 % (03/19/22 1130)   Vital Signs (24h Range):  Temp:  [97.9 °F (36.6 °C)-99.4 °F (37.4 °C)] 98.5 °F (36.9 °C)  Pulse:  [82-95] 95  Resp:  [15-20] 18  SpO2:  [88 %-100 %] 91 %  BP: (106-136)/(55-74) 124/55     Weight: (!) 149.7 kg (330 lb)  Body mass index is 54.91 kg/m².    Intake/Output Summary (Last 24 hours) at 3/19/2022 1352  Last data filed at 3/18/2022 2000  Gross per 24 hour   Intake 600 ml   Output 825 ml   Net -225 ml      Physical Exam  Vitals and nursing note reviewed.   Constitutional:       General: He is not in acute distress.     Appearance: Normal appearance. He is obese. He is not ill-appearing.   HENT:      Head: Normocephalic and atraumatic.      Mouth/Throat:      Mouth: Mucous membranes are moist.      Pharynx: Oropharynx is  clear.   Eyes:      General: No scleral icterus.     Pupils: Pupils are equal, round, and reactive to light.   Neck:      Comments: Unable to assess JVD  Cardiovascular:      Rate and Rhythm: Normal rate and regular rhythm.      Pulses: Normal pulses.      Heart sounds: Murmur heard.   Pulmonary:      Effort: Pulmonary effort is normal.      Breath sounds: Wheezing and rales present.   Abdominal:      General: Abdomen is flat. There is distension.      Palpations: Abdomen is soft.      Tenderness: There is no abdominal tenderness.   Musculoskeletal:         General: No tenderness.      Right lower leg: Edema (2+) present.      Left lower leg: Edema (2+) present.   Lymphadenopathy:      Cervical: No cervical adenopathy.   Skin:     General: Skin is dry.      Capillary Refill: Capillary refill takes 2 to 3 seconds.   Neurological:      General: No focal deficit present.      Mental Status: He is alert and oriented to person, place, and time.   Psychiatric:         Mood and Affect: Mood normal.         Behavior: Behavior normal.       Significant Labs: All pertinent labs within the past 24 hours have been reviewed.  CBC:   Recent Labs   Lab 03/18/22  0020 03/18/22  0605 03/19/22  0828   WBC 7.94 7.66 7.69   HGB 15.6 15.4 15.5   HCT 54.7* 55.9* 55.1*    218 206     CMP:   Recent Labs   Lab 03/18/22  0020 03/19/22  0553    139   K 3.8 3.6   CL 86* 85*   CO2 40* 42*   GLU 99 84   BUN 24* 23*   CREATININE 1.3 1.1   CALCIUM 9.7 10.1   PROT 8.9*  --    ALBUMIN 3.1*  --    BILITOT 0.7  --    ALKPHOS 132  --    AST 27  --    ALT 21  --    ANIONGAP 11 12   EGFRNONAA >60.0 >60.0       Significant Imaging: I have reviewed all pertinent imaging results/findings within the past 24 hours.      Assessment/Plan:      * Acute and chronic respiratory failure with hypercapnia  Patient with Hypercapnic Respiratory failure which is Acute on chronic.  he is on home oxygen at 3 LPM. Supplemental oxygen was provided and noted-  Oxygen Concentration (%):  [60] 60.   Signs/symptoms of respiratory failure include- N/A. Asymptomatic. Contributing diagnoses includes - CHF, Obesity Hypoventilation and structure heart disease, pulmonary hypertension Labs and images were reviewed. Patient Has not had a recent ABG. Will treat underlying causes and adjust management of respiratory failure.    Plan:  - 120 IV Lasix BID  - RHC after adequate diuresis  - CMP daily    Paroxysmal atrial fibrillation  Patient with Paroxysmal (<7 days) atrial fibrillation which is controlled currently with Beta Blocker. Patient is currently in sinus rhythm.[id number] Score: The patient doesn't have any registry metric data available. Anticoagulation indicated. Anticoagulation done with warfarin.      Plan:  - Rate control with target HR <110  - Metoprolol tartrate vs diltiazem  - Hold for SBP <90/60 or P<45 bpm  - Patient's [id number] score is 2, annual risk of stroke is 2.9  - Score > 1 requires anticoagulation preferably with Warfarin  - HAS-BLED Score:  - Hypertension (+1)  Yes  - Impaired Renal Function (Dialysis, transplant, Cr >2.26 mg/dL) (+1)  No  - Impaired Liver Function (Cirrhosis or bilirubin >2x normal with AST/ALT/AP >3x normal) (+1)  No  - History of Stroke (+1)  No  - History of Bleeding (+1)  No  - Labile INRs (Unstable/high INRs, time in therapeutic range <60%) (+1)  No  - >65 years (+1)  No  - Drugs (Antiplatelet agents, NSAIDs) (+1)  No  - Alcohol Consumption (+1)  No    - Total Score:  1  - Risk of bleeding: 3.4%    - Score >=3 indicates high bleeding risk.  - HAS-BLED > CHADVAS indicates risk greater than benefit.  - Magnesium > 2, Potassium > 4  - Continuous telemetry  - TTE  - Cardiology consult and evaluation, recs appreciated                Chest pain, rule out acute myocardial infarction  Patient is a 46-year-old male with previous structural heart disease, morbid obesity, and pulmonary hypertension coming in with high risk chest pain.  He  describes it as substernal pressure which waxes and wanes.  He is currently asymptomatic.  EKG shows no changes.  Initial troponins and BNP are low however in the setting of morbid obesity this could be artificial.  He is not requiring any more oxygen than normal however of VBG revealed hypercapnia with mild respiratory acidosis overlying chronic compensated respiratory acidosis.              Hypertension  Essential.     Plan:  - metoprolol 25 BID  - Target /80 unless otherwise indicated  - Lifestyle modifications (DASH diet, Mediterranean diet, weight loss with target BMI 18-25, at least 150 minute moderate intensity exercise/week)  - Risk factor modification (smoking cessation, HbA1C < 6.5, Minimize alcohol intake with no more than 1-2 glasses of beer or wine per day or 10 or less drinks per week)  - Statin therapy as indicated by The 10-year ASCVD risk score (Tiff MENDIOLA Jr., et al., 2013) is: 6.4%    Values used to calculate the score:      Age: 46 years      Sex: Male      Is Non- : Yes      Diabetic: No      Tobacco smoker: No      Systolic Blood Pressure: 124 mmHg      Is BP treated: Yes      HDL Cholesterol: 50 mg/dL      Total Cholesterol: 162 mg/dL  - Target LDL < 130, HDL > 40  - Magnesium > 2, Potassium > 4        PFO (patent foramen ovale)  This is the cause of the patient developing structural heart disease.  There was an attempt in 2006 to corrected which ultimately failed      Pulmonary hypertension  The patient takes sildenafil and bosetren for pHTN, will continue in the hospital.    Plan:  - After sufficient diuresis, will plan to consult [facility] for RHC to understand etiology of PAH.       Cor pulmonale  Patient does tell [facility] for his cardiology appointments.  He takes warfarin and most of his INRs have been well within range.  He is compliant with his medications.  The patient takes warfarin 10 mg every day except Thursday for which he takes 5 mg.       TTE 3/29/19:  · Mildly  decreased left ventricular systolic function. The estimated ejection fraction is 48% ( probably upper 40s to at most low 50s but lower on the auto EF)  · Mild global hypokinetic wall motion.  · Septal wall has abnormal motion.  · Grade I (mild) left ventricular diastolic dysfunction consistent with impaired relaxation.  · Normal left atrial pressure.  · Mild right ventricular enlargement.  · Normal right ventricular systolic function.  · Mild pulmonic regurgitation.  · Normal central venous pressure (3 mm Hg).  · The estimated PA systolic pressure is 20 mm Hg          Plan:  - furosemide 80 mg BID IV  - Avoid salt intake > 2 g/day  - Strict I's/O's  - Daily standing weights  - Compression stockings as tolerate  - magnesium > 2, potassium > 4  - Goal directed medical therapy as indicated including:    - metoprolol        Pickwickian syndrome  Morbid obesity complicates all aspects of disease management from diagnostic modalities to treatment. Weight loss encouraged and health benefits explained to patient.     - BiPAP QHS as tolerated        Long term current use of anticoagulant therapy  Continue anticoagulation to maintain INR between 2 and 3        VTE Risk Mitigation (From admission, onward)         Ordered     warfarin (COUMADIN) tablet 5 mg  Once per day on Sun Mon Wed Fri         03/18/22 1122     warfarin (COUMADIN) tablet 10 mg  Once per day on Tue Thu Sat         03/18/22 1123     warfarin (COUMADIN) tablet 10 mg  Daily PRN         03/18/22 0320     IP VTE HIGH RISK PATIENT  Once         03/18/22 0318     Place sequential compression device  Until discontinued         03/18/22 0318     Reason for No Pharmacological VTE Prophylaxis  Once        Question:  Reasons:  Answer:  Already adequately anticoagulated on oral Anticoagulants    03/18/22 0318                Discharge Planning   ESTEBAN: 3/22/2022     Code Status: Full Code   Is the patient medically ready for discharge?: No    Reason for patient still in  hospital (select all that apply): Patient trending condition  Discharge Plan A: Home with family                  Carter Blackmon MD  Department of Hospital Medicine   [facility] - Telemetry Stepdown

## 2022-03-19 NOTE — ASSESSMENT & PLAN NOTE
Essential.     Plan:  - metoprolol 25 BID  - Target /80 unless otherwise indicated  - Lifestyle modifications (DASH diet, Mediterranean diet, weight loss with target BMI 18-25, at least 150 minute moderate intensity exercise/week)  - Risk factor modification (smoking cessation, HbA1C < 6.5, Minimize alcohol intake with no more than 1-2 glasses of beer or wine per day or 10 or less drinks per week)  - Statin therapy as indicated by The 10-year ASCVD risk score (Tiff MENDIOLA Jr., et al., 2013) is: 6.4%    Values used to calculate the score:      Age: 46 years      Sex: Male      Is Non- : Yes      Diabetic: No      Tobacco smoker: No      Systolic Blood Pressure: 124 mmHg      Is BP treated: Yes      HDL Cholesterol: 50 mg/dL      Total Cholesterol: 162 mg/dL  - Target LDL < 130, HDL > 40  - Magnesium > 2, Potassium > 4

## 2022-03-19 NOTE — ASSESSMENT & PLAN NOTE
Morbid obesity complicates all aspects of disease management from diagnostic modalities to treatment. Weight loss encouraged and health benefits explained to patient.     - BiPAP QHS as tolerated

## 2022-03-19 NOTE — RESPIRATORY THERAPY
RAPID RESPONSE RESPIRATORY THERAPY PROACTIVE ROUNDING NOTE             Time of visit: 09    Code Status: Full Code   : 1975  Bed: 8093/8093 A:   MRN: [id number]  Time spent at the bedside: < 15 min    SITUATION    Evaluated patient for: HFNC Compliance     BACKGROUND    Patient has a past medical history of Arthritis, CHF (congestive heart failure), Cor pulmonale, Diastolic dysfunction, Gallstones, Hypertension, Left ventricular systolic dysfunction, Morbid obesity, Obesity, MALLIKA (obstructive sleep apnea), PFO (patent foramen ovale), Pickwickian syndrome, Pulmonary hypertension, and Respiratory failure, acute-on-chronic.    24 Hours Vitals Range:  Temp:  [97.5 °F (36.4 °C)-99.4 °F (37.4 °C)]   Pulse:  [82-93]   Resp:  [15-20]   BP: (106-136)/(59-74)   SpO2:  [88 %-100 %]     Labs:    Recent Labs     22  1554 22  0020 22  0605 22  0553    137  --  139   K 4.0 3.8  --  3.6   CL 85* 86*  --  85*   CO2 45* 40*  --  42*   CREATININE 1.3 1.3  --  1.1    99  --  84   PHOS  --   --  3.5 3.6   MG  --   --  1.6 1.5*        Recent Labs     22  0014   PH 7.334*   PCO2 88.1*   PO2 28*   HCO3 46.9*   POCSATURATED 44*   BE 21       ASSESSMENT/INTERVENTIONS        Last VS   Temp: 98.8 °F (37.1 °C) (808)  Pulse: 93 (929)  Resp: 18 (929)  BP: 136/74 (808)  SpO2: 88 % (929)    Level of Consciousness: Level of Consciousness (AVPU): alert  Respiratory Effort: Respiratory Effort: Unlabored Expansion/Accessory Muscle Usage: Expansion/Accessory Muscles/Retractions: expansion symmetric, no use of accessory muscles  All Lung Field Breath Sounds: All Lung Fields Breath Sounds: Anterior:, Posterior:, diminished  O2 Device/Concentration: Flow (L/min): 6 (pt standing up trying to get in bed.), Oxygen Concentration (%): 60, O2 Device (Oxygen Therapy): High Flow nasal Cannula  Was the O2 device able to be weaned? No  Ambu at bedside: Ambu bag with the patient?:  Yes, Adult Ambu    Active Orders   Respiratory Care    Bipap QHS     Frequency: QHS     Number of Occurrences: Until Specified     Order Comments: At high risk of deterioration if BiPAP malfunctions, please monitor       Order Questions:      Mode Bilevel      FiO2% 30      Inspiratory pressure: 16      Expiratory pressure: 11    Oxygen Continuous     Frequency: Continuous     Number of Occurrences: Until Specified     Order Questions:      Device type: High flow      Device: High Flow Nasal Cannula (6 -15 Liters)      LPM: 6-15      Titrate O2 per Oxygen Titration Protocol: Yes      To maintain SpO2 goal of: Other      SpO2 goal: 88-92%      Notify MD of: Inability to achieve desired SpO2; Sudden change in patient status and requires 20% increase in FiO2; Patient requires >60% FiO2    POCT ARTERIAL BLOOD GAS Blood Gas     Frequency: Once     Number of Occurrences: 1 Occurrences     Order Comments: Notify Physician if: see parameters below.       Order Questions:      Component: Blood Gas    POCT Venous Blood Gas Once     Frequency: Once     Number of Occurrences: 1 Occurrences     Order Comments: This test should be used for VBGs.  If using this order for other tests (K, creatinine, HCT, PT/INR, lactate etc)  ONLY do so in the case of an emergency or rapid response.Notify Physician if: see parameters below.         Order Questions:      Component: Blood Gas      Component: Lytes (NA,K,ICA,HCT)    Pulse Oximetry Q4H     Frequency: Q4H     Number of Occurrences: Until Specified    RESPIRATORY COMMUNICATION     Frequency: Until Discontinued     Number of Occurrences: Until Specified     Order Comments: Please titrate O2 for goal sat 88-92%.         RECOMMENDATIONS    We recommend: RRT Recs: wean O2 as tolerated    ESCALATION        FOLLOW-UP    Please call back the Rapid Response RTArely RRT at x [phone] for any questions or concerns.

## 2022-03-20 LAB
ANION GAP SERPL CALC-SCNC: 10 MMOL/L (ref 8–16)
BASOPHILS # BLD AUTO: 0.03 K/UL (ref 0–0.2)
BASOPHILS NFR BLD: 0.5 % (ref 0–1.9)
BUN SERPL-MCNC: 29 MG/DL (ref 6–20)
CALCIUM SERPL-MCNC: 10.1 MG/DL (ref 8.7–10.5)
CHLORIDE SERPL-SCNC: 85 MMOL/L (ref 95–110)
CO2 SERPL-SCNC: 43 MMOL/L (ref 23–29)
CREAT SERPL-MCNC: 1.4 MG/DL (ref 0.5–1.4)
DIFFERENTIAL METHOD: ABNORMAL
EOSINOPHIL # BLD AUTO: 0.3 K/UL (ref 0–0.5)
EOSINOPHIL NFR BLD: 4.3 % (ref 0–8)
ERYTHROCYTE [DISTWIDTH] IN BLOOD BY AUTOMATED COUNT: 18.5 % (ref 11.5–14.5)
EST. GFR  (AFRICAN AMERICAN): >60 ML/MIN/1.73 M^2
EST. GFR  (NON AFRICAN AMERICAN): 59.8 ML/MIN/1.73 M^2
GLUCOSE SERPL-MCNC: 122 MG/DL (ref 70–110)
HCT VFR BLD AUTO: 58.3 % (ref 40–54)
HGB BLD-MCNC: 16.7 G/DL (ref 14–18)
IMM GRANULOCYTES # BLD AUTO: 0.01 K/UL (ref 0–0.04)
IMM GRANULOCYTES NFR BLD AUTO: 0.2 % (ref 0–0.5)
INR PPP: 3.7 (ref 0.8–1.2)
LYMPHOCYTES # BLD AUTO: 1.1 K/UL (ref 1–4.8)
LYMPHOCYTES NFR BLD: 17.3 % (ref 18–48)
MAGNESIUM SERPL-MCNC: 1.6 MG/DL (ref 1.6–2.6)
MCH RBC QN AUTO: 25.9 PG (ref 27–31)
MCHC RBC AUTO-ENTMCNC: 28.6 G/DL (ref 32–36)
MCV RBC AUTO: 90 FL (ref 82–98)
MONOCYTES # BLD AUTO: 0.6 K/UL (ref 0.3–1)
MONOCYTES NFR BLD: 8.7 % (ref 4–15)
NEUTROPHILS # BLD AUTO: 4.5 K/UL (ref 1.8–7.7)
NEUTROPHILS NFR BLD: 69 % (ref 38–73)
NRBC BLD-RTO: 0 /100 WBC
PHOSPHATE SERPL-MCNC: 3.8 MG/DL (ref 2.7–4.5)
PLATELET # BLD AUTO: 202 K/UL (ref 150–450)
PMV BLD AUTO: 9.8 FL (ref 9.2–12.9)
POTASSIUM SERPL-SCNC: 3.6 MMOL/L (ref 3.5–5.1)
PROTHROMBIN TIME: 36 SEC (ref 9–12.5)
RBC # BLD AUTO: 6.45 M/UL (ref 4.6–6.2)
SODIUM SERPL-SCNC: 138 MMOL/L (ref 136–145)
WBC # BLD AUTO: 6.55 K/UL (ref 3.9–12.7)

## 2022-03-20 PROCEDURE — 99223 1ST HOSP IP/OBS HIGH 75: CPT | Mod: ,,, | Performed by: INTERNAL MEDICINE

## 2022-03-20 PROCEDURE — 25000003 PHARM REV CODE 250

## 2022-03-20 PROCEDURE — 83735 ASSAY OF MAGNESIUM: CPT | Performed by: STUDENT IN AN ORGANIZED HEALTH CARE EDUCATION/TRAINING PROGRAM

## 2022-03-20 PROCEDURE — 94761 N-INVAS EAR/PLS OXIMETRY MLT: CPT

## 2022-03-20 PROCEDURE — 85610 PROTHROMBIN TIME: CPT | Performed by: STUDENT IN AN ORGANIZED HEALTH CARE EDUCATION/TRAINING PROGRAM

## 2022-03-20 PROCEDURE — 99900035 HC TECH TIME PER 15 MIN (STAT)

## 2022-03-20 PROCEDURE — 94640 AIRWAY INHALATION TREATMENT: CPT

## 2022-03-20 PROCEDURE — 99223 PR INITIAL HOSPITAL CARE,LEVL III: ICD-10-PCS | Mod: ,,, | Performed by: INTERNAL MEDICINE

## 2022-03-20 PROCEDURE — 36415 COLL VENOUS BLD VENIPUNCTURE: CPT | Performed by: STUDENT IN AN ORGANIZED HEALTH CARE EDUCATION/TRAINING PROGRAM

## 2022-03-20 PROCEDURE — 85025 COMPLETE CBC W/AUTO DIFF WBC: CPT

## 2022-03-20 PROCEDURE — 99233 SBSQ HOSP IP/OBS HIGH 50: CPT | Mod: GC,,, | Performed by: STUDENT IN AN ORGANIZED HEALTH CARE EDUCATION/TRAINING PROGRAM

## 2022-03-20 PROCEDURE — 80048 BASIC METABOLIC PNL TOTAL CA: CPT | Performed by: STUDENT IN AN ORGANIZED HEALTH CARE EDUCATION/TRAINING PROGRAM

## 2022-03-20 PROCEDURE — 20600001 HC STEP DOWN PRIVATE ROOM

## 2022-03-20 PROCEDURE — 99233 PR SUBSEQUENT HOSPITAL CARE,LEVL III: ICD-10-PCS | Mod: GC,,, | Performed by: STUDENT IN AN ORGANIZED HEALTH CARE EDUCATION/TRAINING PROGRAM

## 2022-03-20 PROCEDURE — 84100 ASSAY OF PHOSPHORUS: CPT | Performed by: STUDENT IN AN ORGANIZED HEALTH CARE EDUCATION/TRAINING PROGRAM

## 2022-03-20 PROCEDURE — 27100171 HC OXYGEN HIGH FLOW UP TO 24 HOURS

## 2022-03-20 PROCEDURE — 25000003 PHARM REV CODE 250: Performed by: STUDENT IN AN ORGANIZED HEALTH CARE EDUCATION/TRAINING PROGRAM

## 2022-03-20 PROCEDURE — 94660 CPAP INITIATION&MGMT: CPT

## 2022-03-20 PROCEDURE — 63600175 PHARM REV CODE 636 W HCPCS

## 2022-03-20 RX ORDER — FUROSEMIDE 10 MG/ML
80 INJECTION INTRAMUSCULAR; INTRAVENOUS
Status: DISCONTINUED | OUTPATIENT
Start: 2022-03-20 | End: 2022-03-23

## 2022-03-20 RX ORDER — FUROSEMIDE 10 MG/ML
80 INJECTION INTRAMUSCULAR; INTRAVENOUS
Status: DISCONTINUED | OUTPATIENT
Start: 2022-03-20 | End: 2022-03-20

## 2022-03-20 RX ADMIN — FUROSEMIDE 80 MG: 10 INJECTION, SOLUTION INTRAMUSCULAR; INTRAVENOUS at 02:03

## 2022-03-20 RX ADMIN — NASAL 1 SPRAY: 6.5 SPRAY NASAL at 06:03

## 2022-03-20 RX ADMIN — SENNOSIDES AND DOCUSATE SODIUM 1 TABLET: 50; 8.6 TABLET ORAL at 09:03

## 2022-03-20 RX ADMIN — BOSENTAN 125 MG: 125 TABLET, FILM COATED ORAL at 08:03

## 2022-03-20 RX ADMIN — SILDENAFIL 20 MG: 20 TABLET ORAL at 02:03

## 2022-03-20 RX ADMIN — METOPROLOL TARTRATE 25 MG: 25 TABLET, FILM COATED ORAL at 08:03

## 2022-03-20 RX ADMIN — SILDENAFIL 20 MG: 20 TABLET ORAL at 08:03

## 2022-03-20 RX ADMIN — NASAL 1 SPRAY: 6.5 SPRAY NASAL at 10:03

## 2022-03-20 RX ADMIN — SILDENAFIL 20 MG: 20 TABLET ORAL at 09:03

## 2022-03-20 RX ADMIN — METOPROLOL TARTRATE 25 MG: 25 TABLET, FILM COATED ORAL at 09:03

## 2022-03-20 RX ADMIN — Medication 400 MG: at 09:03

## 2022-03-20 RX ADMIN — Medication 400 MG: at 08:03

## 2022-03-20 RX ADMIN — BOSENTAN 125 MG: 125 TABLET, FILM COATED ORAL at 09:03

## 2022-03-20 RX ADMIN — FLUTICASONE FUROATE AND VILANTEROL TRIFENATATE 1 PUFF: 100; 25 POWDER RESPIRATORY (INHALATION) at 08:03

## 2022-03-20 RX ADMIN — FUROSEMIDE 120 MG: 10 INJECTION, SOLUTION INTRAMUSCULAR; INTRAVENOUS at 05:03

## 2022-03-20 RX ADMIN — ALLOPURINOL 300 MG: 300 TABLET ORAL at 08:03

## 2022-03-20 NOTE — SUBJECTIVE & OBJECTIVE
Interval History: Comfortable in room on 5 liters NC. Tolerated BiPAP for 7 hrs overnight, but had trouble sleeping. Nosebleeds due to nasal cannula.      Review of Systems   Constitutional:  Positive for fatigue. Negative for chills and fever.   HENT:  Negative for sinus pressure and sinus pain.    Eyes:  Negative for visual disturbance.   Respiratory:  Positive for chest tightness and shortness of breath. Negative for cough.    Cardiovascular:  Positive for leg swelling. Negative for chest pain and palpitations.   Gastrointestinal:  Positive for abdominal distention. Negative for abdominal pain, constipation, diarrhea, nausea and vomiting.   Genitourinary:  Negative for difficulty urinating and dysuria.   Musculoskeletal:  Negative for back pain.   Skin:  Negative for rash.   Neurological:  Negative for light-headedness and headaches.   Hematological:  Does not bruise/bleed easily.   Psychiatric/Behavioral:  Negative for agitation and behavioral problems.    Objective:     Vital Signs (Most Recent):  Temp: 97.8 °F (36.6 °C) (03/20/22 0749)  Pulse: 81 (03/20/22 1202)  Resp: 14 (03/20/22 1202)  BP: (!) 121/59 (03/20/22 0749)  SpO2: 96 % (03/20/22 1202)   Vital Signs (24h Range):  Temp:  [97.4 °F (36.3 °C)-98.4 °F (36.9 °C)] 97.8 °F (36.6 °C)  Pulse:  [77-94] 81  Resp:  [14-20] 14  SpO2:  [92 %-98 %] 96 %  BP: (115-129)/(59-80) 121/59     Weight: (!) 150.2 kg (331 lb 2.1 oz)  Body mass index is 55.1 kg/m².    Intake/Output Summary (Last 24 hours) at 3/20/2022 1438  Last data filed at 3/19/2022 1700  Gross per 24 hour   Intake --   Output 450 ml   Net -450 ml      Physical Exam  Vitals and nursing note reviewed.   Constitutional:       General: He is not in acute distress.     Appearance: Normal appearance. He is obese. He is not ill-appearing.   HENT:      Head: Normocephalic and atraumatic.      Mouth/Throat:      Mouth: Mucous membranes are moist.      Pharynx: Oropharynx is clear.   Eyes:      General: No scleral  icterus.     Pupils: Pupils are equal, round, and reactive to light.   Neck:      Comments: Unable to assess JVD  Cardiovascular:      Rate and Rhythm: Normal rate and regular rhythm.      Pulses: Normal pulses.      Heart sounds: Murmur heard.   Pulmonary:      Effort: Pulmonary effort is normal.      Breath sounds: Wheezing and rales present.   Abdominal:      General: Abdomen is flat. There is distension.      Palpations: Abdomen is soft.      Tenderness: There is no abdominal tenderness.   Musculoskeletal:         General: No tenderness.      Right lower leg: Edema (2+) present.      Left lower leg: Edema (2+) present.   Lymphadenopathy:      Cervical: No cervical adenopathy.   Skin:     General: Skin is dry.      Capillary Refill: Capillary refill takes 2 to 3 seconds.   Neurological:      General: No focal deficit present.      Mental Status: He is alert and oriented to person, place, and time.   Psychiatric:         Mood and Affect: Mood normal.         Behavior: Behavior normal.       Significant Labs: All pertinent labs within the past 24 hours have been reviewed.  CBC:   Recent Labs   Lab 03/19/22  0828 03/20/22  0510   WBC 7.69 6.55   HGB 15.5 16.7   HCT 55.1* 58.3*    202     CMP:   Recent Labs   Lab 03/19/22  0553 03/20/22  0510    138   K 3.6 3.6   CL 85* 85*   CO2 42* 43*   GLU 84 122*   BUN 23* 29*   CREATININE 1.1 1.4   CALCIUM 10.1 10.1   ANIONGAP 12 10   EGFRNONAA >60.0 59.8*       Significant Imaging: I have reviewed all pertinent imaging results/findings within the past 24 hours.

## 2022-03-20 NOTE — CONSULTS
[facility], [place]  Heart Failure/Transplant   Consult        Yong Mcintyre  YOB: 1975  Medical Record Number:  [id number]  Attending Physician:  Zabrina Leos MD   Date of Admission: 3/17/2022       Hospital Day:  2  Current Principal Problem:  Acute and chronic respiratory failure with hypercapnia      History     Cc: shortness of breath     HPI  Mr Yong Mcintyre is a 46 year old male with previously labeled group III pulmonary hypertension (secondary to chronic lung disease/OHS) on home oxygen, cor pulmonale, patent foramen ovale, hypertension, paroxysmal atrial fibrillation, CKD who presented to [facility] with shortness of breath and chest pain. Cardiology was consulted and chest pain was felt to be likely noncardiac in etiology as troponin was negative and EKG had no acute changes. He was felt to be volume overloaded and admitted to medicine for IV diuretics. He has been on BID lasix and per chart is negative about a liter since hospital admission. His weight is about a pound higher compared to admission. Blood gas and chemistry demonstrate evidence of chronic hypercapnia, baseline CO2 appears to be around 80. An echo was obtained with poor windows but LVEF appears to be grossly normal.     Regarding his pulmonary history, he has had a diagnosis of pulmonary hypertension for some years. He is followed by Dr. Mando Child at [facility]. He takes sildenafil and bosentan chronically. It has previously been felt that his pulmonary hypertension is out of proportion to the degree of hypoxic lung disease. Pulmonology was consulted and recommends repeating right heart catheterization. HTS was consulted for consideration of RHC.     On my assessment Mr Mcintyre reports he is feeling much better than on admission. He says he has been urinating frequently. Overall he felt as is his disease has been stable for years, has not noticed increasing oxygen requirements and it has been years since he has been  hospitalized for volume overload or respiratory issues. He does report his last right heart cath was over a decade ago.       Medications - Outpatient  Prior to Admission medications    Medication Sig Start Date End Date Taking? Authorizing Provider   ADVAIR DISKUS 250-50 mcg/dose diskus inhaler Inhale 1 puff into the lungs 2 (two) times daily. Controller 3/7/21  Yes Jim Child MD   albuterol (VENTOLIN HFA) 90 mcg/actuation inhaler Inhale 2 puffs into the lungs every 4 (four) hours as needed for Wheezing. Rescue 3/7/21  Yes Jim Child MD   allopurinoL (ZYLOPRIM) 300 MG tablet Take 1 tablet (300 mg total) by mouth once daily. 3/7/21  Yes Jim Child MD   bosentan (TRACLEER) 125 MG Tab TAKE 1 TABLET BY MOUTH TWICE A DAY 9/20/21  Yes Jim Child MD   furosemide (LASIX) 80 MG tablet TAKE 1 TABLET(80 MG) BY MOUTH TWICE DAILY 3/12/22  Yes Jim Child MD   magnesium oxide (MAG-OX) 400 mg (241.3 mg magnesium) tablet Take 1 tablet (400 mg total) by mouth 2 (two) times daily. 3/7/21  Yes Jim Child MD   metOLazone (ZAROXOLYN) 2.5 MG tablet Take 1 tablet (2.5 mg total) by mouth 3 (three) times a week. 3/8/21  Yes Jim Child MD   metoprolol tartrate (LOPRESSOR) 25 MG tablet TAKE 1 TABLET BY MOUTH TWICE DAILY 11/3/21  Yes Shanell Rooney NP   multivitamin (THERAGRAN) per tablet Take by mouth.   Yes Historical Provider   potassium chloride (MICRO-K) 10 MEQ CpSR TAKE 1 CAPSULE(10 MEQ) BY MOUTH EVERY DAY 6/6/21  Yes Gianni Escalona MD   sildenafil (REVATIO) 20 mg Tab Take 1 tablet (20 mg total) by mouth 3 (three) times daily. 3/19/21  Yes Jim Child MD   warfarin (COUMADIN) 10 MG tablet TAKE 1 TABLET BY MOUTH EVERY DAY EXCEPT TAKE 1 1/2 TABLET EVERY THURSDAY OR AS DIRECTED 6/11/21  Yes Sheri Ritter MD   warfarin (COUMADIN) 10 MG tablet TAKE 1 TABLET BY MOUTH EVERY DAY EXCEPT 1 AND 1/2 TABLETS EVERY THURSDAY OR AS DIRECTED  "6/8/21   Gianni Escalona MD   warfarin (COUMADIN) 10 MG tablet TAKE 1 TABLET BY MOUTH EVERY DAY EXCEPT 1 AND 1/2 TABLETS ON THURSDAY AS DIRECTED 12/11/21   Sheri Ritter MD   warfarin (COUMADIN) 10 MG tablet TAKE 1 TABLET BY MOUTH EVERY DAY EXCEPT 1 AND 1/2 TABLETS ON THURSDAY AS DIRECTED 1/9/22   Sheri Ritter MD         Medications - Current  Scheduled Meds:   allopurinoL  300 mg Oral Daily    bosentan  125 mg Oral BID    fluticasone furoate-vilanteroL  1 puff Inhalation Daily    furosemide (LASIX) injection  80 mg Intravenous Q12H    magnesium oxide  400 mg Oral BID    metOLazone  2.5 mg Oral Once per day on Mon Wed Fri    metoprolol tartrate  25 mg Oral BID    polyethylene glycol  17 g Oral Daily    senna-docusate 8.6-50 mg  1 tablet Oral BID    sildenafil  20 mg Oral TID    sodium chloride  1 spray Each Nostril Q8H     Continuous Infusions:  PRN Meds:.acetaminophen, albuterol, melatonin, ondansetron, prochlorperazine      Allergies  Review of patient's allergies indicates:   Allergen Reactions    Lipitor [atorvastatin] Other (See Comments)     "it makes my legs feel like jelly"  Other reaction(s): causes legs to hurt         Past Medical History  Past Medical History:   Diagnosis Date    Arthritis     CHF (congestive heart failure)     Cor pulmonale 11/5/2012    Diastolic dysfunction     Gallstones     Hypertension     Left ventricular systolic dysfunction 11/5/2012    Morbid obesity 11/5/2012    Obesity     MALLIKA (obstructive sleep apnea)     PFO (patent foramen ovale) 11/5/2012    Pickwickian syndrome 11/5/2012    Pulmonary hypertension     Respiratory failure, acute-on-chronic 3/7/2014         Past Surgical History  No past surgical history on file.      Social History  Social History     Socioeconomic History    Marital status: Single   Tobacco Use    Smoking status: Never Smoker    Smokeless tobacco: Never Used   Substance and Sexual Activity    Alcohol use: Yes     " Comment: holidays  rare    Drug use: No    Sexual activity: Not Currently     Partners: Female         ROS  10 point ROS performed and negative except as stated in HPI       Physical Examination         Vital Signs  Vitals  Temp: 97.8 °F (36.6 °C)  Temp src: Oral  Pulse: 88  Heart Rate Source: Monitor  Resp: (!) 22  SpO2: (!) 92 %  Pulse Oximetry Type: Continuous (per nursing monitor)  Flow (L/min): 5  Oxygen Concentration (%): 60  O2 Device (Oxygen Therapy): High Flow nasal Cannula  BP: (!) 121/59  MAP (mmHg): 80  BP Location: Left arm  Patient Position: Lying          24 Hour VS Range    Temp:  [97.4 °F (36.3 °C)-98.4 °F (36.9 °C)]   Pulse:  [77-94]   Resp:  [14-22]   BP: (115-129)/(59-80)   SpO2:  [89 %-98 %]     Intake/Output Summary (Last 24 hours) at 3/20/2022 1523  Last data filed at 3/19/2022 1700  Gross per 24 hour   Intake --   Output 450 ml   Net -450 ml         Physical Exam:   Constitutional: no acute distress  HEENT: NCAT, EOMI, no scleral icterus  Cardiovascular: Regular rate and rhythm, no murmurs appreciated. 2+ carotid, radial, DP pulses bilaterally. No significant JVD appreciated   Pulmonary: Decreased breath sounds but largely clear to auscultation   Abdomen: nontender, non-distended. No presacral edema   Neuro: alert and oriented, no focal deficits  Extremities: warm, mild nonpitting edema with prominent varicose veins  MSK: no deformities  Integument: intact, no rashes     Data       Recent Labs   Lab 03/16/22  1554 03/18/22  0020 03/18/22  0605 03/19/22  0828 03/20/22  0510   WBC 6.11 7.94 7.66 7.69 6.55   HGB 15.9 15.6 15.4 15.5 16.7   HCT 56.6* 54.7* 55.9* 55.1* 58.3*    203 218 206 202        Recent Labs   Lab 03/16/22  1554 03/18/22  0035 03/18/22  0605 03/19/22  0553 03/20/22  0510   INR 2.0* 2.0* 1.9* 2.2* 3.7*        Recent Labs   Lab 03/16/22  1554 03/18/22  0020 03/19/22  0553 03/20/22  0510    137 139 138   K 4.0 3.8 3.6 3.6   CL 85* 86* 85* 85*   CO2 45* 40* 42* 43*    BUN 20 24* 23* 29*   CREATININE 1.3 1.3 1.1 1.4   ANIONGAP 11 11 12 10   CALCIUM 9.7 9.7 10.1 10.1        Recent Labs   Lab 03/16/22  1554 03/18/22  0020   PROT 8.6* 8.9*   ALBUMIN 3.0* 3.1*   BILITOT 0.7 0.7   ALKPHOS 133 132   AST 27 27   ALT 23 21        Recent Labs   Lab 03/18/22  0020 03/18/22  0605   TROPONINI 0.009 0.008  <0.006        BNP (pg/mL)   Date Value   03/18/2022 26   12/07/2019 11   10/15/2019 <10   03/04/2019 <10   02/18/2019 <10       No results for input(s): LABBLOO in the last 168 hours.     ECG:  Normal sinus rhythm   Right axis deviation   Anteroseptal T wave inversions        Assessment & Plan   6 year old male with previously labeled mixed group I/II/III pulmonary hypertension on home oxygen, cor pulmonale, patent foramen ovale, hypertension, paroxysmal atrial fibrillation, CKD who presented to [facility] with shortness of breath and chest pain. Pain does not appear ischemic in etiology. Felt to be volume overloaded and diuresing per primary. HTS consulted for consideration of right heart catheterization.        Pulmonary Hypertension  Volume overload   Atrial fibrillation:   Thought to be mixed etiology, group I/II/III. On home O2, endothelin antagonist and PDE inhibitor. Primary team requesting right heart catheterization to further delineate. He doesn't appear to be grossly volume overloaded but could probably benefit from additional diuresis prior to any procedure. Further his INR is supratherapeutic at 3.7 and warfarin will need to be held.   -Continue diuresis  -Hold warfarin  -Will follow up with patient tomorrow        Herminio Flaherty MD  [facility]   Cardiovascular Disease PGY-IV

## 2022-03-20 NOTE — RESPIRATORY THERAPY
RAPID RESPONSE RESPIRATORY THERAPY PROACTIVE ROUNDING NOTE             Time of visit: 0759     Code Status: Full Code   : 1975  Bed: 8093/8093 A:   MRN: [id number]  Time spent at the bedside: < 15 min    SITUATION    Evaluated patient for: HFNC Compliance     BACKGROUND    Patient has a past medical history of Arthritis, CHF (congestive heart failure), Cor pulmonale, Diastolic dysfunction, Gallstones, Hypertension, Left ventricular systolic dysfunction, Morbid obesity, Obesity, MALLIKA (obstructive sleep apnea), PFO (patent foramen ovale), Pickwickian syndrome, Pulmonary hypertension, and Respiratory failure, acute-on-chronic.    24 Hours Vitals Range:  Temp:  [97.4 °F (36.3 °C)-98.8 °F (37.1 °C)]   Pulse:  [77-95]   Resp:  [15-20]   BP: (115-136)/(55-80)   SpO2:  [88 %-99 %]     Labs:    Recent Labs     22  0020 22  0605 22  0553 22  0510     --  139 138   K 3.8  --  3.6 3.6   CL 86*  --  85* 85*   CO2 40*  --  42* 43*   CREATININE 1.3  --  1.1 1.4   GLU 99  --  84 122*   PHOS  --  3.5 3.6 3.8   MG  --  1.6 1.5* 1.6        Recent Labs     22  0014 22  1006   PH 7.334* 7.374   PCO2 88.1* 80.1*   PO2 28* 63*   HCO3 46.9* 46.7*   POCSATURATED 44* 89*   BE 21 22       ASSESSMENT/INTERVENTIONS      Last VS   Temp: 97.8 °F (36.6 °C) (749)  Pulse: 84 (749)  Resp: 20 (749)  BP: 121/59 (749)  SpO2: 95 % (749)    Level of Consciousness: Level of Consciousness (AVPU): alert  Respiratory Effort: Respiratory Effort: Unlabored, Normal Expansion/Accessory Muscle Usage: Expansion/Accessory Muscles/Retractions: expansion symmetric, no retractions, no use of accessory muscles  All Lung Field Breath Sounds: All Lung Fields Breath Sounds: Anterior:, Lateral:, diminished  O2 Device/Concentration: Flow (L/min): (S) 5, Oxygen Concentration (%): 60, O2 Device (Oxygen Therapy): BiPAP  Was the O2 device able to be weaned? Yes  Ambu at bedside: Ambu bag with the  patient?: Yes, Adult Ambu    Active Orders   Respiratory Care    Bipap QHS     Frequency: QHS     Number of Occurrences: Until Specified     Order Comments: At high risk of deterioration if BiPAP malfunctions, please monitor       Order Questions:      Mode Bilevel      FiO2% 30      Inspiratory pressure: 16      Expiratory pressure: 11    Oxygen Continuous     Frequency: Continuous     Number of Occurrences: Until Specified     Order Questions:      Device type: High flow      Device: High Flow Nasal Cannula (6 -15 Liters)      LPM: 6-15      Titrate O2 per Oxygen Titration Protocol: Yes      To maintain SpO2 goal of: Other      SpO2 goal: 88-92%      Notify MD of: Inability to achieve desired SpO2; Sudden change in patient status and requires 20% increase in FiO2; Patient requires >60% FiO2    Pulse Oximetry Q4H     Frequency: Q4H     Number of Occurrences: Until Specified    RESPIRATORY COMMUNICATION     Frequency: Until Discontinued     Number of Occurrences: Until Specified     Order Comments: Please titrate O2 for goal sat 88-92%.         RECOMMENDATIONS    We recommend: RRT Recs: wean o2    ESCALATION    .    FOLLOW-UP    Please call back the Rapid Response RT, Snoiya Sofia, RRT at x [phone] for any questions or concerns.

## 2022-03-20 NOTE — ASSESSMENT & PLAN NOTE
The patient takes sildenafil and bosetren for pHTN, will continue in the hospital.    Plan:  - Consulted HTS for RHC  - Cont 80 IV Lasix BID

## 2022-03-20 NOTE — ASSESSMENT & PLAN NOTE
Patient with Hypercapnic Respiratory failure which is Acute on chronic.  he is on home oxygen at 3 LPM. Supplemental oxygen was provided and noted-  .   Signs/symptoms of respiratory failure include- N/A. Asymptomatic. Contributing diagnoses includes - CHF, Obesity Hypoventilation and structure heart disease, pulmonary hypertension Labs and images were reviewed. Patient Has not had a recent ABG. Will treat underlying causes and adjust management of respiratory failure.    Plan:  - 80 IV Lasix BID  - RHC after adequate diuresis  - CMP daily

## 2022-03-20 NOTE — PROGRESS NOTES
[facility] - Telemetry [facility] Medicine  Progress Note    Patient Name: Yong Mcintyre  MRN: [id number]  Patient Class: IP- Inpatient   Admission Date: 3/17/2022  Length of Stay: 2 days  Attending Physician: Zabrina Leos MD  Primary Care Provider: Gianni Escalona MD        Subjective:     Principal Problem:Acute and chronic respiratory failure with hypercapnia        HPI:  Patient is a 46-year-old male with past medical history significant for PFO, cor pulmonale, Pickwickian syndrome, pulmonary hypertension, obese hypoventilation syndrome, morbid obesity with BMI between 50 and 60, congestive heart failure, long-term use of anticoagulation, high blood pressure who is presenting with 24 hour history of chest pain and shortness of breath.  Patient states that he has been having worsening substernal chest pressure throughout the day.  It does not appear to be exertional. It waxes and wanes with episodes lasting 15-25 minutes patient is currently asymptomatic at this time.  Nothing relieves the pain and it does not appear to be positional.  A VBG was done on the patient which noted a pCO2 of 88 on the patient's home oxygen.  He has a history of chronic hypoxic respiratory failure and requires 3 L of oxygen around the clock.  Additionally he has pulmonary hypertension and boseten and sildenafil.  The patient was admitted for observation and to evaluate for highly suspicious chest pain.    Patient was diagnosed with clinically significant PFO in 2006.  He underwent attempts via the neck to repair the hole which were ultimately unsuccessful.  The patient has developed atrial fibrillation and this required long-term anticoagulation with warfarin.  The VBG on admission shows a pH of 7.336 with the CO2 of 88 indicating a large component of chronic hypercapnia.  The patient is hemodynamically stable and on his home oxygen.  His SpO2 on 3 L fluctuates between 89 and 95%.  He is currently asymptomatic and appears  comfortable.      Overview/Hospital Course:  45 [place] [facility] [place] secondary to pulmonary hypertension. Pt being diuresed 120 IV Lasix BID for pulmonary edema. Weaning oxygen as tolerated and wearing BiPAP at night. [facility] consulted for Right heart cath.       Interval History: Comfortable in room on 5 liters NC. Tolerated BiPAP for 7 hrs overnight, but had trouble sleeping. Nosebleeds due to nasal cannula.      Review of Systems   Constitutional:  Positive for fatigue. Negative for chills and fever.   HENT:  Negative for sinus pressure and sinus pain.    Eyes:  Negative for visual disturbance.   Respiratory:  Positive for chest tightness and shortness of breath. Negative for cough.    Cardiovascular:  Positive for leg swelling. Negative for chest pain and palpitations.   Gastrointestinal:  Positive for abdominal distention. Negative for abdominal pain, constipation, diarrhea, nausea and vomiting.   Genitourinary:  Negative for difficulty urinating and dysuria.   Musculoskeletal:  Negative for back pain.   Skin:  Negative for rash.   Neurological:  Negative for light-headedness and headaches.   Hematological:  Does not bruise/bleed easily.   Psychiatric/Behavioral:  Negative for agitation and behavioral problems.    Objective:     Vital Signs (Most Recent):  Temp: 97.8 °F (36.6 °C) (03/20/22 0749)  Pulse: 81 (03/20/22 1202)  Resp: 14 (03/20/22 1202)  BP: (!) 121/59 (03/20/22 0749)  SpO2: 96 % (03/20/22 1202)   Vital Signs (24h Range):  Temp:  [97.4 °F (36.3 °C)-98.4 °F (36.9 °C)] 97.8 °F (36.6 °C)  Pulse:  [77-94] 81  Resp:  [14-20] 14  SpO2:  [92 %-98 %] 96 %  BP: (115-129)/(59-80) 121/59     Weight: (!) 150.2 kg (331 lb 2.1 oz)  Body mass index is 55.1 kg/m².    Intake/Output Summary (Last 24 hours) at 3/20/2022 1436  Last data filed at 3/19/2022 1700  Gross per 24 hour   Intake --   Output 450 ml   Net -450 ml      Physical Exam  Vitals and nursing note reviewed.    Constitutional:       General: He is not in acute distress.     Appearance: Normal appearance. He is obese. He is not ill-appearing.   HENT:      Head: Normocephalic and atraumatic.      Mouth/Throat:      Mouth: Mucous membranes are moist.      Pharynx: Oropharynx is clear.   Eyes:      General: No scleral icterus.     Pupils: Pupils are equal, round, and reactive to light.   Neck:      Comments: Unable to assess JVD  Cardiovascular:      Rate and Rhythm: Normal rate and regular rhythm.      Pulses: Normal pulses.      Heart sounds: Murmur heard.   Pulmonary:      Effort: Pulmonary effort is normal.      Breath sounds: Wheezing and rales present.   Abdominal:      General: Abdomen is flat. There is distension.      Palpations: Abdomen is soft.      Tenderness: There is no abdominal tenderness.   Musculoskeletal:         General: No tenderness.      Right lower leg: Edema (2+) present.      Left lower leg: Edema (2+) present.   Lymphadenopathy:      Cervical: No cervical adenopathy.   Skin:     General: Skin is dry.      Capillary Refill: Capillary refill takes 2 to 3 seconds.   Neurological:      General: No focal deficit present.      Mental Status: He is alert and oriented to person, place, and time.   Psychiatric:         Mood and Affect: Mood normal.         Behavior: Behavior normal.       Significant Labs: All pertinent labs within the past 24 hours have been reviewed.  CBC:   Recent Labs   Lab 03/19/22  0828 03/20/22  0510   WBC 7.69 6.55   HGB 15.5 16.7   HCT 55.1* 58.3*    202     CMP:   Recent Labs   Lab 03/19/22  0553 03/20/22  0510    138   K 3.6 3.6   CL 85* 85*   CO2 42* 43*   GLU 84 122*   BUN 23* 29*   CREATININE 1.1 1.4   CALCIUM 10.1 10.1   ANIONGAP 12 10   EGFRNONAA >60.0 59.8*       Significant Imaging: I have reviewed all pertinent imaging results/findings within the past 24 hours.      Assessment/Plan:      * Acute and chronic respiratory failure with hypercapnia  Patient with  Hypercapnic Respiratory failure which is Acute on chronic.  he is on home oxygen at 3 LPM. Supplemental oxygen was provided and noted-  .   Signs/symptoms of respiratory failure include- N/A. Asymptomatic. Contributing diagnoses includes - CHF, Obesity Hypoventilation and structure heart disease, pulmonary hypertension Labs and images were reviewed. Patient Has not had a recent ABG. Will treat underlying causes and adjust management of respiratory failure.    Plan:  - 80 IV Lasix BID  - RHC after adequate diuresis  - CMP daily    Paroxysmal atrial fibrillation  Patient with Paroxysmal (<7 days) atrial fibrillation which is controlled currently with Beta Blocker. Patient is currently in sinus rhythm.[id number] Score: The patient doesn't have any registry metric data available. Anticoagulation indicated. Anticoagulation done with warfarin.      Plan:  - Rate control with target HR <110  - Metoprolol tartrate vs diltiazem  - Hold for SBP <90/60 or P<45 bpm  - Patient's [id number] score is 2, annual risk of stroke is 2.9  - Score > 1 requires anticoagulation preferably with Warfarin  - HAS-BLED Score:  - Hypertension (+1)  Yes  - Impaired Renal Function (Dialysis, transplant, Cr >2.26 mg/dL) (+1)  No  - Impaired Liver Function (Cirrhosis or bilirubin >2x normal with AST/ALT/AP >3x normal) (+1)  No  - History of Stroke (+1)  No  - History of Bleeding (+1)  No  - Labile INRs (Unstable/high INRs, time in therapeutic range <60%) (+1)  No  - >65 years (+1)  No  - Drugs (Antiplatelet agents, NSAIDs) (+1)  No  - Alcohol Consumption (+1)  No    - Total Score:  1  - Risk of bleeding: 3.4%    - Score >=3 indicates high bleeding risk.  - HAS-BLED > CHADVAS indicates risk greater than benefit.  - Magnesium > 2, Potassium > 4  - Continuous telemetry  - TTE  - Cardiology consult and evaluation, recs appreciated                Chest pain, rule out acute myocardial infarction  Patient is a 46-year-old male with previous structural  heart disease, morbid obesity, and pulmonary hypertension coming in with high risk chest pain.  He describes it as substernal pressure which waxes and wanes.  He is currently asymptomatic.  EKG shows no changes.  Initial troponins and BNP are low however in the setting of morbid obesity this could be artificial.  He is not requiring any more oxygen than normal however of VBG revealed hypercapnia with mild respiratory acidosis overlying chronic compensated respiratory acidosis.              Hypertension  Essential.     Plan:  - metoprolol 25 BID  - Target /80 unless otherwise indicated  - Lifestyle modifications (DASH diet, Mediterranean diet, weight loss with target BMI 18-25, at least 150 minute moderate intensity exercise/week)  - Risk factor modification (smoking cessation, HbA1C < 6.5, Minimize alcohol intake with no more than 1-2 glasses of beer or wine per day or 10 or less drinks per week)  - Statin therapy as indicated by The 10-year ASCVD risk score (Tiff MENDIOLA Jr., et al., 2013) is: 6.4%    Values used to calculate the score:      Age: 46 years      Sex: Male      Is Non- : Yes      Diabetic: No      Tobacco smoker: No      Systolic Blood Pressure: 124 mmHg      Is BP treated: Yes      HDL Cholesterol: 50 mg/dL      Total Cholesterol: 162 mg/dL  - Target LDL < 130, HDL > 40  - Magnesium > 2, Potassium > 4        PFO (patent foramen ovale)  This is the cause of the patient developing structural heart disease.  There was an attempt in 2006 to corrected which ultimately failed      Pulmonary hypertension  The patient takes sildenafil and bosetren for pHTN, will continue in the hospital.    Plan:  - Consulted [facility] for RHC  - Cont 80 IV Lasix BID    Cor pulmonale  Patient does [place] [facility] [place].  He takes warfarin and most of his INRs have been well within range.  He is compliant with his medications.  The patient takes warfarin 10 mg every day except Thursday  for which he takes 5 mg.       TTE 3/29/19:  · Mildly decreased left ventricular systolic function. The estimated ejection fraction is 48% ( probably upper 40s to at most low 50s but lower on the auto EF)  · Mild global hypokinetic wall motion.  · Septal wall has abnormal motion.  · Grade I (mild) left ventricular diastolic dysfunction consistent with impaired relaxation.  · Normal left atrial pressure.  · Mild right ventricular enlargement.  · Normal right ventricular systolic function.  · Mild pulmonic regurgitation.  · Normal central venous pressure (3 mm Hg).  · The estimated PA systolic pressure is 20 mm Hg          Plan:  - furosemide 80 mg BID IV  - Avoid salt intake > 2 g/day  - Strict I's/O's  - Daily standing weights  - Compression stockings as tolerate  - magnesium > 2, potassium > 4  - Goal directed medical therapy as indicated including:    - metoprolol        Pickwickian syndrome  Morbid obesity complicates all aspects of disease management from diagnostic modalities to treatment. Weight loss encouraged and health benefits explained to patient.     - BiPAP QHS as tolerated        Long term current use of anticoagulant therapy  Continue anticoagulation to maintain INR between 2 and 3        VTE Risk Mitigation (From admission, onward)         Ordered     IP VTE HIGH RISK PATIENT  Once         03/18/22 0318     Place sequential compression device  Until discontinued         03/18/22 0318     Reason for No Pharmacological VTE Prophylaxis  Once        Question:  Reasons:  Answer:  Already adequately anticoagulated on oral Anticoagulants    03/18/22 0318                Discharge Planning   ESTEBAN: 3/24/2022     Code Status: Full Code   Is the patient medically ready for discharge?: No    Reason for patient still in hospital (select all that apply): Patient trending condition  Discharge Plan A: Home with family                  Carter Blackmon MD  Department of Hospital Medicine   [facility] - Telemetry  Stepdown

## 2022-03-20 NOTE — PLAN OF CARE
Problem: Adult Inpatient Plan of Care  Goal: Plan of Care Review  Outcome: Ongoing, Progressing  Goal: Patient-Specific Goal (Individualized)  Outcome: Ongoing, Progressing  Goal: Absence of Hospital-Acquired Illness or Injury  Outcome: Ongoing, Progressing  Goal: Optimal Comfort and Wellbeing  Outcome: Ongoing, Progressing  Goal: Readiness for Transition of Care  Outcome: Ongoing, Progressing     Problem: Bariatric Environmental Safety  Goal: Safety Maintained with Care  Outcome: Ongoing, Progressing   Patient AAOx4. VVS. Patient on 5L HiFlow O2 stats WDL. BiPap @HS. NSR on the monitor. IV lasix given. Cxray done showing signs of improvement. POC and safety reviewed with patient. NO acute distress noted. WCTM.

## 2022-03-20 NOTE — HOSPITAL COURSE
45 yo M admitted to [facility] [place] with acute on chronic hypoxic and hypercapnic respiratory failure secondary to ADHF. S/p 120 IV Lasix BID for pulmonary edema. Weaning oxygen as tolerated and wearing BiPAP at night. HTS consulted for Right heart cath which was performed on 3/22. RA 8 Pulm pressures 58/28 (45); wedge 25. Per pulm recs Sildenafil was discontinued and pt transitioned to po lasix on 3/23 given euvolemia. Will dc home with bosentan, po lasix 80 mg bid and metolazone 2.5 mg TID. He is to follow up with outpatient pulm/phtn clinic, and cardiology in addition to pcp in 1 week.

## 2022-03-21 LAB
ANION GAP SERPL CALC-SCNC: 11 MMOL/L (ref 8–16)
BASOPHILS # BLD AUTO: 0.04 K/UL (ref 0–0.2)
BASOPHILS NFR BLD: 0.6 % (ref 0–1.9)
BUN SERPL-MCNC: 32 MG/DL (ref 6–20)
CALCIUM SERPL-MCNC: 9.7 MG/DL (ref 8.7–10.5)
CHLORIDE SERPL-SCNC: 84 MMOL/L (ref 95–110)
CO2 SERPL-SCNC: 42 MMOL/L (ref 23–29)
CREAT SERPL-MCNC: 1.4 MG/DL (ref 0.5–1.4)
DIFFERENTIAL METHOD: ABNORMAL
EOSINOPHIL # BLD AUTO: 0.1 K/UL (ref 0–0.5)
EOSINOPHIL NFR BLD: 0.9 % (ref 0–8)
ERYTHROCYTE [DISTWIDTH] IN BLOOD BY AUTOMATED COUNT: 17.9 % (ref 11.5–14.5)
EST. GFR  (AFRICAN AMERICAN): >60 ML/MIN/1.73 M^2
EST. GFR  (NON AFRICAN AMERICAN): 59.8 ML/MIN/1.73 M^2
GLUCOSE SERPL-MCNC: 83 MG/DL (ref 70–110)
HCT VFR BLD AUTO: 56 % (ref 40–54)
HGB BLD-MCNC: 15.9 G/DL (ref 14–18)
IMM GRANULOCYTES # BLD AUTO: 0.01 K/UL (ref 0–0.04)
IMM GRANULOCYTES NFR BLD AUTO: 0.2 % (ref 0–0.5)
INR PPP: 2.2 (ref 0.8–1.2)
LYMPHOCYTES # BLD AUTO: 1.5 K/UL (ref 1–4.8)
LYMPHOCYTES NFR BLD: 22.3 % (ref 18–48)
MAGNESIUM SERPL-MCNC: 1.6 MG/DL (ref 1.6–2.6)
MCH RBC QN AUTO: 25.4 PG (ref 27–31)
MCHC RBC AUTO-ENTMCNC: 28.4 G/DL (ref 32–36)
MCV RBC AUTO: 90 FL (ref 82–98)
MONOCYTES # BLD AUTO: 0.6 K/UL (ref 0.3–1)
MONOCYTES NFR BLD: 8.6 % (ref 4–15)
NEUTROPHILS # BLD AUTO: 4.4 K/UL (ref 1.8–7.7)
NEUTROPHILS NFR BLD: 67.4 % (ref 38–73)
NRBC BLD-RTO: 0 /100 WBC
PHOSPHATE SERPL-MCNC: 2.9 MG/DL (ref 2.7–4.5)
PLATELET # BLD AUTO: 214 K/UL (ref 150–450)
PMV BLD AUTO: 10.5 FL (ref 9.2–12.9)
POTASSIUM SERPL-SCNC: 3.2 MMOL/L (ref 3.5–5.1)
PROTHROMBIN TIME: 21.9 SEC (ref 9–12.5)
RBC # BLD AUTO: 6.25 M/UL (ref 4.6–6.2)
SODIUM SERPL-SCNC: 137 MMOL/L (ref 136–145)
WBC # BLD AUTO: 6.54 K/UL (ref 3.9–12.7)

## 2022-03-21 PROCEDURE — 94640 AIRWAY INHALATION TREATMENT: CPT | Mod: ER

## 2022-03-21 PROCEDURE — 25000003 PHARM REV CODE 250

## 2022-03-21 PROCEDURE — 27000221 HC OXYGEN, UP TO 24 HOURS

## 2022-03-21 PROCEDURE — 85610 PROTHROMBIN TIME: CPT | Performed by: STUDENT IN AN ORGANIZED HEALTH CARE EDUCATION/TRAINING PROGRAM

## 2022-03-21 PROCEDURE — 25000003 PHARM REV CODE 250: Performed by: STUDENT IN AN ORGANIZED HEALTH CARE EDUCATION/TRAINING PROGRAM

## 2022-03-21 PROCEDURE — 99232 PR SUBSEQUENT HOSPITAL CARE,LEVL II: ICD-10-PCS | Mod: GC,,, | Performed by: INTERNAL MEDICINE

## 2022-03-21 PROCEDURE — 94761 N-INVAS EAR/PLS OXIMETRY MLT: CPT

## 2022-03-21 PROCEDURE — 99232 SBSQ HOSP IP/OBS MODERATE 35: CPT | Mod: GC,,, | Performed by: INTERNAL MEDICINE

## 2022-03-21 PROCEDURE — 20600001 HC STEP DOWN PRIVATE ROOM

## 2022-03-21 PROCEDURE — 63600175 PHARM REV CODE 636 W HCPCS

## 2022-03-21 PROCEDURE — 99233 PR SUBSEQUENT HOSPITAL CARE,LEVL III: ICD-10-PCS | Mod: ,,, | Performed by: INTERNAL MEDICINE

## 2022-03-21 PROCEDURE — 36415 COLL VENOUS BLD VENIPUNCTURE: CPT | Performed by: STUDENT IN AN ORGANIZED HEALTH CARE EDUCATION/TRAINING PROGRAM

## 2022-03-21 PROCEDURE — 80048 BASIC METABOLIC PNL TOTAL CA: CPT | Performed by: STUDENT IN AN ORGANIZED HEALTH CARE EDUCATION/TRAINING PROGRAM

## 2022-03-21 PROCEDURE — 84100 ASSAY OF PHOSPHORUS: CPT | Performed by: STUDENT IN AN ORGANIZED HEALTH CARE EDUCATION/TRAINING PROGRAM

## 2022-03-21 PROCEDURE — 85025 COMPLETE CBC W/AUTO DIFF WBC: CPT

## 2022-03-21 PROCEDURE — 94660 CPAP INITIATION&MGMT: CPT

## 2022-03-21 PROCEDURE — 99232 PR SUBSEQUENT HOSPITAL CARE,LEVL II: ICD-10-PCS | Mod: GC,,, | Performed by: STUDENT IN AN ORGANIZED HEALTH CARE EDUCATION/TRAINING PROGRAM

## 2022-03-21 PROCEDURE — 83735 ASSAY OF MAGNESIUM: CPT | Performed by: STUDENT IN AN ORGANIZED HEALTH CARE EDUCATION/TRAINING PROGRAM

## 2022-03-21 PROCEDURE — 99900035 HC TECH TIME PER 15 MIN (STAT)

## 2022-03-21 PROCEDURE — 99232 SBSQ HOSP IP/OBS MODERATE 35: CPT | Mod: GC,,, | Performed by: STUDENT IN AN ORGANIZED HEALTH CARE EDUCATION/TRAINING PROGRAM

## 2022-03-21 PROCEDURE — 99233 SBSQ HOSP IP/OBS HIGH 50: CPT | Mod: ,,, | Performed by: INTERNAL MEDICINE

## 2022-03-21 RX ORDER — POTASSIUM CHLORIDE 20 MEQ/1
40 TABLET, EXTENDED RELEASE ORAL ONCE
Status: COMPLETED | OUTPATIENT
Start: 2022-03-21 | End: 2022-03-21

## 2022-03-21 RX ORDER — SODIUM CHLORIDE 0.9 % (FLUSH) 0.9 %
10 SYRINGE (ML) INJECTION
Status: DISCONTINUED | OUTPATIENT
Start: 2022-03-21 | End: 2022-03-23 | Stop reason: HOSPADM

## 2022-03-21 RX ORDER — TIOTROPIUM BROMIDE 18 UG/1
18 CAPSULE ORAL; RESPIRATORY (INHALATION) DAILY
COMMUNITY

## 2022-03-21 RX ADMIN — BOSENTAN 125 MG: 125 TABLET, FILM COATED ORAL at 09:03

## 2022-03-21 RX ADMIN — NASAL 1 SPRAY: 6.5 SPRAY NASAL at 06:03

## 2022-03-21 RX ADMIN — SILDENAFIL 20 MG: 20 TABLET ORAL at 02:03

## 2022-03-21 RX ADMIN — METOLAZONE 2.5 MG: 2.5 TABLET ORAL at 10:03

## 2022-03-21 RX ADMIN — METOPROLOL TARTRATE 25 MG: 25 TABLET, FILM COATED ORAL at 09:03

## 2022-03-21 RX ADMIN — ALLOPURINOL 300 MG: 300 TABLET ORAL at 09:03

## 2022-03-21 RX ADMIN — NASAL 1 SPRAY: 6.5 SPRAY NASAL at 10:03

## 2022-03-21 RX ADMIN — SILDENAFIL 20 MG: 20 TABLET ORAL at 09:03

## 2022-03-21 RX ADMIN — FUROSEMIDE 80 MG: 10 INJECTION, SOLUTION INTRAMUSCULAR; INTRAVENOUS at 02:03

## 2022-03-21 RX ADMIN — FLUTICASONE FUROATE AND VILANTEROL TRIFENATATE 1 PUFF: 100; 25 POWDER RESPIRATORY (INHALATION) at 09:03

## 2022-03-21 RX ADMIN — POTASSIUM CHLORIDE 40 MEQ: 1500 TABLET, EXTENDED RELEASE ORAL at 09:03

## 2022-03-21 RX ADMIN — NASAL 1 SPRAY: 6.5 SPRAY NASAL at 02:03

## 2022-03-21 RX ADMIN — SENNOSIDES AND DOCUSATE SODIUM 1 TABLET: 50; 8.6 TABLET ORAL at 09:03

## 2022-03-21 NOTE — PLAN OF CARE
Problem: Adult Inpatient Plan of Care  Goal: Plan of Care Review  Outcome: Ongoing, Progressing  Goal: Patient-Specific Goal (Individualized)  Outcome: Ongoing, Progressing  Goal: Absence of Hospital-Acquired Illness or Injury  Outcome: Ongoing, Progressing  Goal: Optimal Comfort and Wellbeing  Outcome: Ongoing, Progressing  Goal: Readiness for Transition of Care  Outcome: Ongoing, Progressing     Problem: Bariatric Environmental Safety  Goal: Safety Maintained with Care  Outcome: Ongoing, Progressing     Problem: Skin Injury Risk Increased  Goal: Skin Health and Integrity  Outcome: Ongoing, Progressing    END OF SHIFT NOTES:  PATIENT RESTED MOST OF THE NIGHT. PATIENT WORE HIS BIPAP. NO DISTRESS NOTED. VITALS STABLE. PATIENT TURNS SELF Q 2 HOURS AND AMBULATES TO BATHROOM W/ A STEADY GAIT. BED IN LOW POSITION & CALL LIGHT WITH IN REACH.

## 2022-03-21 NOTE — PROGRESS NOTES
[facility] Pulmonary/Critical Care Fellow Progress Note:    Brief Hx: 46 y.o. male with a PMHx of MALLIKA on BiPAP (), PFO s/p failed repair, pulmonary HTN (I?/II/III) on bosentan and sildenafil, CHF, chronic resp failure on 3L NC, a fib on coumadin who is admitted for acute on chronic hypoxemic respiratory failure thought to be due to volume overload. He diuresed over the weekend and is doing better from a respiratory standpoint. Plan for possible RHC while in-patient to address his PH therapy in the setting of known CHF.     Subjective:  Patient wearing BiPAP this AM and reports feeling better than when he came in with minimal cough.     Objective:  Last 24 Hour Vital Signs:  BP  Min: 113/58  Max: 133/63  Temp  Av.1 °F (36.7 °C)  Min: 97.5 °F (36.4 °C)  Max: 98.5 °F (36.9 °C)  Pulse  Av.1  Min: 80  Max: 99  Resp  Av.4  Min: 12  Max: 22  SpO2  Av.4 %  Min: 89 %  Max: 99 %  Weight  Av.3 kg (302 lb 11.1 oz)  Min: 137.3 kg (302 lb 11.1 oz)  Max: 137.3 kg (302 lb 11.1 oz)  Body mass index is 50.37 kg/m².  I/O last 3 completed shifts:  In: 1350 [P.O.:1350]  Out: 1750 [Urine:1750]        Physical Exam:  General: Alert and awake in NAD  HENT:  NCAT; anicteric sclera; OP clear with MMM  Cardio:  Regular rate and rhythm with normal S1 and S2; no murmurs or rubs  Resp:  CTAB; respirations unlabored; no wheezes, crackles or rhonchi  Abdom: Soft, NTND   Extrem: WWP with no clubbing, cyanosis or edema  Pulses: 2+ and symmetric distally  Neuro:  AAOx3; cooperative and pleasant with no focal deficits      Assessment & Plan:      46 y.o.  male with a PMHx of MALLIKA on BiPAP (), PFO s/p failed repair, pulmonary HTN (I/II/III) on bosentan and sildenafil, CHF, chronic resp failure on 3L NC, a fib on coumadin who is admitted with hypoxic respiratory failure that is acute on chronic and may represent worsening HF in the setting of PH on dual-therapy.   - continue bosentan and sildenafil for now  - continue  IV lasix for diuresis with plan for net negative daily - patient seems to be approaching close to his home O2 requirements and euvolemia  - if possible would like RHC during admission to assess L sided pressures which can help us determine if PH therapy needs to be adjusted, noted that HTS has been consulted  - holding coumadin in setting of possible procedure as well as supratherapeutic INR, down to 2.2 today  - goal SpO2 88-90% (he is around 90% at home on 3L)      Es Alvarado MD  Pulm/CC Fellow  PGY-V

## 2022-03-21 NOTE — PROGRESS NOTES
[facility] - Tele[facility] Medicine  Progress Note    Patient Name: Yong Mcintyre  MRN: [id number]  Patient Class: IP- Inpatient   Admission Date: 3/17/2022  Length of Stay: 3 days  Attending Physician: Zabrina Leos MD  Primary Care Provider: Gianni Escalona MD        Subjective:     Principal Problem:Acute and chronic respiratory failure with hypercapnia        HPI:  Patient is a 46-year-old male with past medical history significant for PFO, cor pulmonale, Pickwickian syndrome, pulmonary hypertension, obese hypoventilation syndrome, morbid obesity with BMI between 50 and 60, congestive heart failure, long-term use of anticoagulation, high blood pressure who is presenting with 24 hour history of chest pain and shortness of breath.  Patient states that he has been having worsening substernal chest pressure throughout the day.  It does not appear to be exertional. It waxes and wanes with episodes lasting 15-25 minutes patient is currently asymptomatic at this time.  Nothing relieves the pain and it does not appear to be positional.  A VBG was done on the patient which noted a pCO2 of 88 on the patient's home oxygen.  He has a history of chronic hypoxic respiratory failure and requires 3 L of oxygen around the clock.  Additionally he has pulmonary hypertension and boseten and sildenafil.  The patient was admitted for observation and to evaluate for highly suspicious chest pain.    Patient was diagnosed with clinically significant PFO in 2006.  He underwent attempts via the neck to repair the hole which were ultimately unsuccessful.  The patient has developed atrial fibrillation and this required long-term anticoagulation with warfarin.  The VBG on admission shows a pH of 7.336 with the CO2 of 88 indicating a large component of chronic hypercapnia.  The patient is hemodynamically stable and on his home oxygen.  His SpO2 on 3 L fluctuates between 89 and 95%.  He is currently asymptomatic and appears  comfortable.      Overview/Hospital Course:  45 [place] [facility] [place] chronic respiratory failure secondary to pulmonary hypertension. Pt being diuresed 120 IV Lasix BID for pulmonary edema. Weaning oxygen as tolerated and wearing BiPAP at night. [facility] consulted for Right heart cath. Continuing 80 IV lasix BID- pt noted to be euvolemic on 03/21. Due to euvolemia and therapeutic INR level, pt to have RHC on 03/22.       Interval History: NAEON. Wearing BiPAP overnight and resting comfortably in room. INR is therapeutic. Potassium repleted this AM    Review of Systems   Constitutional:  Positive for fatigue. Negative for chills and fever.   HENT:  Negative for sinus pressure and sinus pain.    Eyes:  Negative for visual disturbance.   Respiratory:  Positive for shortness of breath. Negative for cough and chest tightness.    Cardiovascular:  Positive for leg swelling. Negative for chest pain and palpitations.   Gastrointestinal:  Positive for abdominal distention. Negative for abdominal pain, constipation, diarrhea, nausea and vomiting.   Genitourinary:  Negative for difficulty urinating and dysuria.   Musculoskeletal:  Negative for back pain.   Skin:  Negative for rash.   Neurological:  Negative for light-headedness and headaches.   Hematological:  Does not bruise/bleed easily.   Psychiatric/Behavioral:  Negative for agitation and behavioral problems.    Objective:     Vital Signs (Most Recent):  Temp: 98.5 °F (36.9 °C) (03/21/22 1554)  Pulse: 85 (03/21/22 1554)  Resp: (!) 26 (03/21/22 1554)  BP: 115/60 (03/21/22 1554)  SpO2: (!) 89 % (03/21/22 1554)   Vital Signs (24h Range):  Temp:  [97.5 °F (36.4 °C)-98.8 °F (37.1 °C)] 98.5 °F (36.9 °C)  Pulse:  [80-99] 85  Resp:  [12-26] 26  SpO2:  [89 %-99 %] 89 %  BP: (110-133)/(56-67) 115/60     Weight: (!) 137.3 kg (302 lb 11.1 oz)  Body mass index is 50.37 kg/m².    Intake/Output Summary (Last 24 hours) at 3/21/2022 1559  Last data filed at 3/21/2022 1444  Gross  per 24 hour   Intake 1410 ml   Output 1950 ml   Net -540 ml      Physical Exam  Vitals and nursing note reviewed.   Constitutional:       General: He is not in acute distress.     Appearance: Normal appearance. He is obese. He is not ill-appearing.   HENT:      Head: Normocephalic and atraumatic.      Mouth/Throat:      Mouth: Mucous membranes are moist.      Pharynx: Oropharynx is clear.   Eyes:      General: No scleral icterus.     Pupils: Pupils are equal, round, and reactive to light.   Neck:      Comments: Unable to assess JVD  Cardiovascular:      Rate and Rhythm: Normal rate and regular rhythm.      Pulses: Normal pulses.      Heart sounds: Murmur heard.   Pulmonary:      Effort: Pulmonary effort is normal.      Breath sounds: Rales present. No wheezing.   Abdominal:      General: Abdomen is flat. There is distension.      Palpations: Abdomen is soft.      Tenderness: There is no abdominal tenderness.   Musculoskeletal:         General: No tenderness.      Right lower leg: Edema (2+) present.      Left lower leg: Edema (2+) present.   Lymphadenopathy:      Cervical: No cervical adenopathy.   Skin:     General: Skin is dry.      Capillary Refill: Capillary refill takes 2 to 3 seconds.   Neurological:      General: No focal deficit present.      Mental Status: He is alert and oriented to person, place, and time.   Psychiatric:         Mood and Affect: Mood normal.         Behavior: Behavior normal.       Significant Labs: All pertinent labs within the past 24 hours have been reviewed.  CBC:   Recent Labs   Lab 03/20/22  0510 03/21/22  0628   WBC 6.55 6.54   HGB 16.7 15.9   HCT 58.3* 56.0*    214     CMP:   Recent Labs   Lab 03/20/22  0510 03/21/22  0628    137   K 3.6 3.2*   CL 85* 84*   CO2 43* 42*   * 83   BUN 29* 32*   CREATININE 1.4 1.4   CALCIUM 10.1 9.7   ANIONGAP 10 11   EGFRNONAA 59.8* 59.8*       Significant Imaging: I have reviewed all pertinent imaging results/findings within the  past 24 hours.      Assessment/Plan:      * Acute and chronic respiratory failure with hypercapnia  Patient with Hypercapnic Respiratory failure which is Acute on chronic.  he is on home oxygen at 3 LPM. Supplemental oxygen was provided and noted- Oxygen Concentration (%):  [60] 60.   Signs/symptoms of respiratory failure include- N/A. Asymptomatic. Contributing diagnoses includes - CHF, Obesity Hypoventilation and structure heart disease, pulmonary hypertension Labs and images were reviewed. Patient Has not had a recent ABG. Will treat underlying causes and adjust management of respiratory failure.    Plan:  - 80 IV Lasix BID  - RHC on 03/22 after adequate diuresis  - CMP daily    Paroxysmal atrial fibrillation  Patient with Paroxysmal (<7 days) atrial fibrillation which is controlled currently with Beta Blocker. Patient is currently in sinus rhythm.[id number] Score: The patient doesn't have any registry metric data available. Anticoagulation indicated. Anticoagulation done with warfarin.      Plan:  - Rate control with target HR <110  - Metoprolol tartrate vs diltiazem  - Hold for SBP <90/60 or P<45 bpm  - Patient's [id number] score is 2, annual risk of stroke is 2.9  - Score > 1 requires anticoagulation preferably with Warfarin  - HAS-BLED Score:  - Hypertension (+1)  Yes  - Impaired Renal Function (Dialysis, transplant, Cr >2.26 mg/dL) (+1)  No  - Impaired Liver Function (Cirrhosis or bilirubin >2x normal with AST/ALT/AP >3x normal) (+1)  No  - History of Stroke (+1)  No  - History of Bleeding (+1)  No  - Labile INRs (Unstable/high INRs, time in therapeutic range <60%) (+1)  No  - >65 years (+1)  No  - Drugs (Antiplatelet agents, NSAIDs) (+1)  No  - Alcohol Consumption (+1)  No    - Total Score:  1  - Risk of bleeding: 3.4%    - Score >=3 indicates high bleeding risk.  - HAS-BLED > CHADVAS indicates risk greater than benefit.  - Magnesium > 2, Potassium > 4  - Continuous telemetry  - TTE  - Cardiology consult  and evaluation, recs appreciated                Chest pain, rule out acute myocardial infarction  Patient is a 46-year-old male with previous structural heart disease, morbid obesity, and pulmonary hypertension coming in with high risk chest pain.  He describes it as substernal pressure which waxes and wanes.  He is currently asymptomatic.  EKG shows no changes.  Initial troponins and BNP are low however in the setting of morbid obesity this could be artificial.  He is not requiring any more oxygen than normal however of VBG revealed hypercapnia with mild respiratory acidosis overlying chronic compensated respiratory acidosis.              Hypertension  Essential.     Plan:  - metoprolol 25 BID  - Target /80 unless otherwise indicated  - Lifestyle modifications (DASH diet, Mediterranean diet, weight loss with target BMI 18-25, at least 150 minute moderate intensity exercise/week)  - Risk factor modification (smoking cessation, HbA1C < 6.5, Minimize alcohol intake with no more than 1-2 glasses of beer or wine per day or 10 or less drinks per week)  - Statin therapy as indicated by The 10-year ASCVD risk score (Arlingtonxochitl MENDIOLA Jr., et al., 2013) is: 6.4%    Values used to calculate the score:      Age: 46 years      Sex: Male      Is Non- : Yes      Diabetic: No      Tobacco smoker: No      Systolic Blood Pressure: 124 mmHg      Is BP treated: Yes      HDL Cholesterol: 50 mg/dL      Total Cholesterol: 162 mg/dL  - Target LDL < 130, HDL > 40  - Magnesium > 2, Potassium > 4        PFO (patent foramen ovale)  This is the cause of the patient developing structural heart disease.  There was an attempt in 2006 to corrected which ultimately failed      Pulmonary hypertension  The patient takes sildenafil and bosetren for pHTN, will continue in the hospital.    Plan:  - Consulted HTS for RHC  - Cont 80 IV Lasix BID  - INR therapeutic and pt closer to euvolemia. RHC on 03/22 to evaluate PAH    Cor  pulmonale  [place] [facility] [place].  He takes warfarin and most of his INRs have been well within range.  He is compliant with his medications.  The patient takes warfarin 10 mg every day except Thursday for which he takes 5 mg.       TTE 3/29/19:  · Mildly decreased left ventricular systolic function. The estimated ejection fraction is 48% ( probably upper 40s to at most low 50s but lower on the auto EF)  · Mild global hypokinetic wall motion.  · Septal wall has abnormal motion.  · Grade I (mild) left ventricular diastolic dysfunction consistent with impaired relaxation.  · Normal left atrial pressure.  · Mild right ventricular enlargement.  · Normal right ventricular systolic function.  · Mild pulmonic regurgitation.  · Normal central venous pressure (3 mm Hg).  · The estimated PA systolic pressure is 20 mm Hg          Plan:  - furosemide 80 mg BID IV  - Avoid salt intake > 2 g/day  - Strict I's/O's  - Daily standing weights  - Compression stockings as tolerate  - magnesium > 2, potassium > 4  - Goal directed medical therapy as indicated including:    - metoprolol        Pickwickian syndrome  Morbid obesity complicates all aspects of disease management from diagnostic modalities to treatment. Weight loss encouraged and health benefits explained to patient.     - BiPAP QHS as tolerated        Long term current use of anticoagulant therapy  Continue anticoagulation to maintain INR between 2 and 3        VTE Risk Mitigation (From admission, onward)         Ordered     IP VTE HIGH RISK PATIENT  Once         03/18/22 0318     Place sequential compression device  Until discontinued         03/18/22 0318     Reason for No Pharmacological VTE Prophylaxis  Once        Question:  Reasons:  Answer:  Already adequately anticoagulated on oral Anticoagulants    03/18/22 0318                Discharge Planning   ESTEBAN: 3/24/2022     Code Status: Full Code   Is the patient medically ready for  discharge?: No    Reason for patient still in hospital (select all that apply): Patient trending condition  Discharge Plan A: Home with family                  Carter Blackmon MD  Department of Hospital Medicine   [facility] - Telemetry Stepdown

## 2022-03-21 NOTE — PHARMACY MED REC
"Admission Medication History     The home medication history was taken by Krystal Wilkinson.    You may go to "Admission" then "Reconcile Home Medications" tabs to review and/or act upon these items.      The home medication list has been updated by the Pharmacy department.    Please read ALL comments highlighted in yellow.    Please address this information as you see fit.     Feel free to contact us if you have any questions or require assistance.      Medications listed below were obtained from: Patient/family  PTA Medications   Medication Sig    acetaminophen (TYLENOL ARTHRITIS ORAL)   Take 2 tablets by mouth daily as needed (pain).    ADVAIR DISKUS 250-50 mcg/dose diskus inhaler   Inhale 1 puff into the lungs 2 (two) times daily. Controller    albuterol (VENTOLIN HFA) 90 mcg/actuation inhaler   Inhale 2 puffs into the lungs every 4 (four) hours as needed for Wheezing. Rescue    allopurinoL (ZYLOPRIM) 300 MG tablet   Take 1 tablet (300 mg total) by mouth once daily.    bosentan (TRACLEER) 125 MG Tab   TAKE 1 TABLET BY MOUTH TWICE A DAY    furosemide (LASIX) 80 MG tablet   TAKE 1 TABLET(80 MG) BY MOUTH TWICE DAILY    magnesium oxide (MAG-OX) 400 mg (241.3 mg magnesium) tablet   Take 1 tablet (400 mg total) by mouth 2 (two) times daily. (Patient taking differently: Take 400 mg by mouth once daily.)    metOLazone (ZAROXOLYN) 2.5 MG tablet   Take 1 tablet (2.5 mg total) by mouth 3 (three) times a week.    metoprolol tartrate (LOPRESSOR) 25 MG tablet   TAKE 1 TABLET BY MOUTH TWICE DAILY    multivit,calc,min/FA/K1/lycop (ONE-A-DAY MEN'S COMPLETE ORAL)   Take 1 tablet by mouth once daily.    potassium chloride (MICRO-K) 10 MEQ CpSR   TAKE 1 CAPSULE(10 MEQ) BY MOUTH EVERY DAY    pseudoeph/DM/guaifen/acetamin (TYLENOL COLD SEVERE CONGEST ORAL)   Take 1 tablet by mouth daily as needed (cough and cold).    sildenafil (REVATIO) 20 mg Tab     Take 1 tablet (20 mg total) by mouth 3 (three) times daily.    " tiotropium (SPIRIVA) 18 mcg inhalation capsule   Inhale 18 mcg into the lungs once daily.    triamcinolone acetonide (NASACORT ALLERGY NASL)   2 sprays by Nasal route once daily.    warfarin (COUMADIN) 10 MG tablet Take 1/2 tablet (5mg) by mouth on Monday, Wednesday and Friday & take 1 tablet (10mg) on Tuesday, Thursday, Saturday, and Sunday.)         Krystal Wilkinson  [id number]                  .

## 2022-03-21 NOTE — PLAN OF CARE
SW assigned to pt's care team.  SW will continue to follow and assist with discharge needs.     Cristina Ruiz LMSW  PRN-  [facility]  Ext. [phone]

## 2022-03-21 NOTE — PLAN OF CARE
Problem: Adult Inpatient Plan of Care  Goal: Plan of Care Review  Outcome: Ongoing, Progressing  Goal: Patient-Specific Goal (Individualized)  Outcome: Ongoing, Progressing  Goal: Absence of Hospital-Acquired Illness or Injury  Outcome: Ongoing, Progressing  Goal: Optimal Comfort and Wellbeing  Outcome: Ongoing, Progressing  Goal: Readiness for Transition of Care  Outcome: Ongoing, Progressing     Problem: Bariatric Environmental Safety  Goal: Safety Maintained with Care  Outcome: Ongoing, Progressing     Problem: Skin Injury Risk Increased  Goal: Skin Health and Integrity  Outcome: Ongoing, Progressing   Patient AAOx4. VVS OS titrated to 3.5L via NC, O2 stat goal 88-92%. NSR on monitor. Patient plan [facility] heart cath 3/22 @ 1300. NPO @ MDN. POC and safety checks reviewed with the patient. LIMA.

## 2022-03-21 NOTE — ASSESSMENT & PLAN NOTE
The patient takes sildenafil and bosetren for pHTN, will continue in the hospital.    Plan:  - Consulted HTS for RHC  - Cont 80 IV Lasix BID  - INR therapeutic and pt closer to euvolemia. RHC on 03/22 to evaluate PAH

## 2022-03-21 NOTE — ASSESSMENT & PLAN NOTE
Patient with Hypercapnic Respiratory failure which is Acute on chronic.  he is on home oxygen at 3 LPM. Supplemental oxygen was provided and noted- Oxygen Concentration (%):  [60] 60.   Signs/symptoms of respiratory failure include- N/A. Asymptomatic. Contributing diagnoses includes - CHF, Obesity Hypoventilation and structure heart disease, pulmonary hypertension Labs and images were reviewed. Patient Has not had a recent ABG. Will treat underlying causes and adjust management of respiratory failure.    Plan:  - 80 IV Lasix BID  - RHC on 03/22 after adequate diuresis  - CMP daily

## 2022-03-21 NOTE — SUBJECTIVE & OBJECTIVE
Interval History: NAEON. Wearing BiPAP overnight and resting comfortably in room. INR is therapeutic. Potassium repleted this AM    Review of Systems   Constitutional:  Positive for fatigue. Negative for chills and fever.   HENT:  Negative for sinus pressure and sinus pain.    Eyes:  Negative for visual disturbance.   Respiratory:  Positive for shortness of breath. Negative for cough and chest tightness.    Cardiovascular:  Positive for leg swelling. Negative for chest pain and palpitations.   Gastrointestinal:  Positive for abdominal distention. Negative for abdominal pain, constipation, diarrhea, nausea and vomiting.   Genitourinary:  Negative for difficulty urinating and dysuria.   Musculoskeletal:  Negative for back pain.   Skin:  Negative for rash.   Neurological:  Negative for light-headedness and headaches.   Hematological:  Does not bruise/bleed easily.   Psychiatric/Behavioral:  Negative for agitation and behavioral problems.    Objective:     Vital Signs (Most Recent):  Temp: 98.5 °F (36.9 °C) (03/21/22 1554)  Pulse: 85 (03/21/22 1554)  Resp: (!) 26 (03/21/22 1554)  BP: 115/60 (03/21/22 1554)  SpO2: (!) 89 % (03/21/22 1554)   Vital Signs (24h Range):  Temp:  [97.5 °F (36.4 °C)-98.8 °F (37.1 °C)] 98.5 °F (36.9 °C)  Pulse:  [80-99] 85  Resp:  [12-26] 26  SpO2:  [89 %-99 %] 89 %  BP: (110-133)/(56-67) 115/60     Weight: (!) 137.3 kg (302 lb 11.1 oz)  Body mass index is 50.37 kg/m².    Intake/Output Summary (Last 24 hours) at 3/21/2022 1559  Last data filed at 3/21/2022 1444  Gross per 24 hour   Intake 1410 ml   Output 1950 ml   Net -540 ml      Physical Exam  Vitals and nursing note reviewed.   Constitutional:       General: He is not in acute distress.     Appearance: Normal appearance. He is obese. He is not ill-appearing.   HENT:      Head: Normocephalic and atraumatic.      Mouth/Throat:      Mouth: Mucous membranes are moist.      Pharynx: Oropharynx is clear.   Eyes:      General: No scleral icterus.      Pupils: Pupils are equal, round, and reactive to light.   Neck:      Comments: Unable to assess JVD  Cardiovascular:      Rate and Rhythm: Normal rate and regular rhythm.      Pulses: Normal pulses.      Heart sounds: Murmur heard.   Pulmonary:      Effort: Pulmonary effort is normal.      Breath sounds: Rales present. No wheezing.   Abdominal:      General: Abdomen is flat. There is distension.      Palpations: Abdomen is soft.      Tenderness: There is no abdominal tenderness.   Musculoskeletal:         General: No tenderness.      Right lower leg: Edema (2+) present.      Left lower leg: Edema (2+) present.   Lymphadenopathy:      Cervical: No cervical adenopathy.   Skin:     General: Skin is dry.      Capillary Refill: Capillary refill takes 2 to 3 seconds.   Neurological:      General: No focal deficit present.      Mental Status: He is alert and oriented to person, place, and time.   Psychiatric:         Mood and Affect: Mood normal.         Behavior: Behavior normal.       Significant Labs: All pertinent labs within the past 24 hours have been reviewed.  CBC:   Recent Labs   Lab 03/20/22  0510 03/21/22  0628   WBC 6.55 6.54   HGB 16.7 15.9   HCT 58.3* 56.0*    214     CMP:   Recent Labs   Lab 03/20/22  0510 03/21/22  0628    137   K 3.6 3.2*   CL 85* 84*   CO2 43* 42*   * 83   BUN 29* 32*   CREATININE 1.4 1.4   CALCIUM 10.1 9.7   ANIONGAP 10 11   EGFRNONAA 59.8* 59.8*       Significant Imaging: I have reviewed all pertinent imaging results/findings within the past 24 hours.

## 2022-03-21 NOTE — PROGRESS NOTES
[facility]  Heart Failure/Transplant  Progress Note     Hospital Length of Stay: 3    Interval History:  Mr Mcintyre was seen and examined this AM. No events overnight, reports feeling well and urinating frequently. Tolerating diet. Breathing is much improved compared to admission.     Vitals:  Temp:  [97.5 °F (36.4 °C)-98.8 °F (37.1 °C)] 98.5 °F (36.9 °C)  Pulse:  [] 102  Resp:  [12-26] 26  SpO2:  [89 %-99 %] 89 %  BP: (110-133)/(56-67) 115/60    Intake/Output    Intake/Output Summary (Last 24 hours) at 3/21/2022 1616  Last data filed at 3/21/2022 1444  Gross per 24 hour   Intake 1050 ml   Output 1950 ml   Net -900 ml       Physical Exam:   Constitutional: no acute distress  HEENT: NCAT, EOMI, no scleral icterus  Cardiovascular: Regular rate and rhythm, no murmurs appreciated. 2+ carotid, radial, DP pulses bilaterally. No significant JVD appreciated   Pulmonary: Decreased breath sounds but largely clear to auscultation   Abdomen: nontender, non-distended. No presacral edema   Neuro: alert and oriented, no focal deficits  Extremities: warm, mild nonpitting edema with prominent varicose veins  MSK: no deformities  Integument: intact, no rashes     Labs:  Recent Labs   Lab 03/19/22  0828 03/20/22  0510 03/21/22  0628   WBC 7.69 6.55 6.54   HGB 15.5 16.7 15.9   HCT 55.1* 58.3* 56.0*    202 214     Recent Labs   Lab 03/19/22  0553 03/20/22  0510 03/21/22  0628    138 137   K 3.6 3.6 3.2*   CL 85* 85* 84*   CO2 42* 43* 42*   BUN 23* 29* 32*   CREATININE 1.1 1.4 1.4   GLU 84 122* 83   CALCIUM 10.1 10.1 9.7   MG 1.5* 1.6 1.6   PHOS 3.6 3.8 2.9       Imaging:       Current Meds:   allopurinoL  300 mg Oral Daily    bosentan  125 mg Oral BID    fluticasone furoate-vilanteroL  1 puff Inhalation Daily    furosemide (LASIX) injection  80 mg Intravenous Q12H    metOLazone  2.5 mg Oral Once per day on Mon Wed Fri    metoprolol tartrate  25 mg Oral BID    polyethylene glycol  17 g Oral  Daily    senna-docusate 8.6-50 mg  1 tablet Oral BID    sildenafil  20 mg Oral TID    sodium chloride  1 spray Each Nostril Q8H       Continuous Infusions:      PRN:  acetaminophen, albuterol, melatonin, ondansetron, prochlorperazine, sodium chloride 0.9%    Assessment and Plan:  6 year old male with previously labeled mixed group I/II/III pulmonary hypertension on home oxygen, cor pulmonale, patent foramen ovale, hypertension, paroxysmal atrial fibrillation, CKD who presented to [facility] with shortness of breath and chest pain. Pain does not appear ischemic in etiology. Felt to be volume overloaded and diuresing per primary. HTS consulted for consideration of right heart catheterization.          Pulmonary Hypertension  Volume overload   Atrial fibrillation:   Thought to be mixed etiology, group I/II/III. On home O2, endothelin antagonist and PDE inhibitor. Primary team requesting right heart catheterization to further delineate. Volume assessment is difficult due to body habitus but symptomatically he is much improved and likely close to euvolemia. Will plan for RHC tomorrow. NPO at midnight.   -Continue diuresis  -Hold warfarin  -RHC tomorrow    Herminio Flaherty MD  [facility]  Cardiovascular Disease, PGY-IV      Staff attestation to follow.

## 2022-03-22 LAB
ANION GAP SERPL CALC-SCNC: 15 MMOL/L (ref 8–16)
BASOPHILS # BLD AUTO: 0.03 K/UL (ref 0–0.2)
BASOPHILS NFR BLD: 0.5 % (ref 0–1.9)
BUN SERPL-MCNC: 40 MG/DL (ref 6–20)
CALCIUM SERPL-MCNC: 9.3 MG/DL (ref 8.7–10.5)
CHLORIDE SERPL-SCNC: 87 MMOL/L (ref 95–110)
CO2 SERPL-SCNC: 36 MMOL/L (ref 23–29)
CREAT SERPL-MCNC: 1.4 MG/DL (ref 0.5–1.4)
DIFFERENTIAL METHOD: ABNORMAL
EOSINOPHIL # BLD AUTO: 0.1 K/UL (ref 0–0.5)
EOSINOPHIL NFR BLD: 1.4 % (ref 0–8)
ERYTHROCYTE [DISTWIDTH] IN BLOOD BY AUTOMATED COUNT: 18.1 % (ref 11.5–14.5)
EST. GFR  (AFRICAN AMERICAN): >60 ML/MIN/1.73 M^2
EST. GFR  (NON AFRICAN AMERICAN): 59.8 ML/MIN/1.73 M^2
GLUCOSE SERPL-MCNC: 63 MG/DL (ref 70–110)
HCT VFR BLD AUTO: 54.3 % (ref 40–54)
HCV AB SERPL QL IA: NEGATIVE
HGB BLD-MCNC: 15.3 G/DL (ref 14–18)
IMM GRANULOCYTES # BLD AUTO: 0.01 K/UL (ref 0–0.04)
IMM GRANULOCYTES NFR BLD AUTO: 0.2 % (ref 0–0.5)
INR PPP: 1.7 (ref 0.8–1.2)
LYMPHOCYTES # BLD AUTO: 1.2 K/UL (ref 1–4.8)
LYMPHOCYTES NFR BLD: 17.5 % (ref 18–48)
MAGNESIUM SERPL-MCNC: 1.6 MG/DL (ref 1.6–2.6)
MCH RBC QN AUTO: 25.6 PG (ref 27–31)
MCHC RBC AUTO-ENTMCNC: 28.2 G/DL (ref 32–36)
MCV RBC AUTO: 91 FL (ref 82–98)
MONOCYTES # BLD AUTO: 0.8 K/UL (ref 0.3–1)
MONOCYTES NFR BLD: 11.5 % (ref 4–15)
NEUTROPHILS # BLD AUTO: 4.6 K/UL (ref 1.8–7.7)
NEUTROPHILS NFR BLD: 68.9 % (ref 38–73)
NRBC BLD-RTO: 0 /100 WBC
PHOSPHATE SERPL-MCNC: 2.9 MG/DL (ref 2.7–4.5)
PLATELET # BLD AUTO: 195 K/UL (ref 150–450)
PMV BLD AUTO: 10.3 FL (ref 9.2–12.9)
POTASSIUM SERPL-SCNC: 3.1 MMOL/L (ref 3.5–5.1)
PROTHROMBIN TIME: 17.3 SEC (ref 9–12.5)
RBC # BLD AUTO: 5.98 M/UL (ref 4.6–6.2)
SODIUM SERPL-SCNC: 138 MMOL/L (ref 136–145)
WBC # BLD AUTO: 6.62 K/UL (ref 3.9–12.7)

## 2022-03-22 PROCEDURE — 99232 SBSQ HOSP IP/OBS MODERATE 35: CPT | Mod: GC,,, | Performed by: INTERNAL MEDICINE

## 2022-03-22 PROCEDURE — 27000221 HC OXYGEN, UP TO 24 HOURS

## 2022-03-22 PROCEDURE — 36415 COLL VENOUS BLD VENIPUNCTURE: CPT | Performed by: STUDENT IN AN ORGANIZED HEALTH CARE EDUCATION/TRAINING PROGRAM

## 2022-03-22 PROCEDURE — 93451 RIGHT HEART CATH: CPT | Performed by: INTERNAL MEDICINE

## 2022-03-22 PROCEDURE — 80048 BASIC METABOLIC PNL TOTAL CA: CPT | Performed by: STUDENT IN AN ORGANIZED HEALTH CARE EDUCATION/TRAINING PROGRAM

## 2022-03-22 PROCEDURE — 83735 ASSAY OF MAGNESIUM: CPT | Performed by: STUDENT IN AN ORGANIZED HEALTH CARE EDUCATION/TRAINING PROGRAM

## 2022-03-22 PROCEDURE — 99900035 HC TECH TIME PER 15 MIN (STAT)

## 2022-03-22 PROCEDURE — 25000003 PHARM REV CODE 250: Performed by: STUDENT IN AN ORGANIZED HEALTH CARE EDUCATION/TRAINING PROGRAM

## 2022-03-22 PROCEDURE — 94761 N-INVAS EAR/PLS OXIMETRY MLT: CPT

## 2022-03-22 PROCEDURE — 25000003 PHARM REV CODE 250

## 2022-03-22 PROCEDURE — 85025 COMPLETE CBC W/AUTO DIFF WBC: CPT

## 2022-03-22 PROCEDURE — 93451 RIGHT HEART CATH: CPT | Mod: 26,,, | Performed by: INTERNAL MEDICINE

## 2022-03-22 PROCEDURE — 20600001 HC STEP DOWN PRIVATE ROOM

## 2022-03-22 PROCEDURE — 63600175 PHARM REV CODE 636 W HCPCS: Performed by: STUDENT IN AN ORGANIZED HEALTH CARE EDUCATION/TRAINING PROGRAM

## 2022-03-22 PROCEDURE — C1894 INTRO/SHEATH, NON-LASER: HCPCS | Performed by: INTERNAL MEDICINE

## 2022-03-22 PROCEDURE — 99232 PR SUBSEQUENT HOSPITAL CARE,LEVL II: ICD-10-PCS | Mod: GC,,, | Performed by: INTERNAL MEDICINE

## 2022-03-22 PROCEDURE — C1751 CATH, INF, PER/CENT/MIDLINE: HCPCS | Performed by: INTERNAL MEDICINE

## 2022-03-22 PROCEDURE — 84100 ASSAY OF PHOSPHORUS: CPT | Performed by: STUDENT IN AN ORGANIZED HEALTH CARE EDUCATION/TRAINING PROGRAM

## 2022-03-22 PROCEDURE — 94660 CPAP INITIATION&MGMT: CPT

## 2022-03-22 PROCEDURE — 25000003 PHARM REV CODE 250: Performed by: INTERNAL MEDICINE

## 2022-03-22 PROCEDURE — 85610 PROTHROMBIN TIME: CPT | Performed by: STUDENT IN AN ORGANIZED HEALTH CARE EDUCATION/TRAINING PROGRAM

## 2022-03-22 PROCEDURE — 63600175 PHARM REV CODE 636 W HCPCS

## 2022-03-22 PROCEDURE — 93451 PR RIGHT HEART CATH O2 SATURATION & CARDIAC OUTPUT: ICD-10-PCS | Mod: 26,,, | Performed by: INTERNAL MEDICINE

## 2022-03-22 PROCEDURE — 99233 PR SUBSEQUENT HOSPITAL CARE,LEVL III: ICD-10-PCS | Mod: GC,,, | Performed by: STUDENT IN AN ORGANIZED HEALTH CARE EDUCATION/TRAINING PROGRAM

## 2022-03-22 PROCEDURE — 99233 SBSQ HOSP IP/OBS HIGH 50: CPT | Mod: GC,,, | Performed by: STUDENT IN AN ORGANIZED HEALTH CARE EDUCATION/TRAINING PROGRAM

## 2022-03-22 RX ORDER — MAGNESIUM SULFATE HEPTAHYDRATE 40 MG/ML
2 INJECTION, SOLUTION INTRAVENOUS ONCE
Status: COMPLETED | OUTPATIENT
Start: 2022-03-22 | End: 2022-03-22

## 2022-03-22 RX ORDER — POTASSIUM CHLORIDE 750 MG/1
50 CAPSULE, EXTENDED RELEASE ORAL ONCE
Status: COMPLETED | OUTPATIENT
Start: 2022-03-22 | End: 2022-03-22

## 2022-03-22 RX ORDER — LIDOCAINE HYDROCHLORIDE AND EPINEPHRINE 20; 10 MG/ML; UG/ML
INJECTION, SOLUTION INFILTRATION; PERINEURAL
Status: DISCONTINUED | OUTPATIENT
Start: 2022-03-22 | End: 2022-03-22 | Stop reason: HOSPADM

## 2022-03-22 RX ADMIN — BOSENTAN 125 MG: 125 TABLET, FILM COATED ORAL at 08:03

## 2022-03-22 RX ADMIN — FUROSEMIDE 80 MG: 10 INJECTION, SOLUTION INTRAMUSCULAR; INTRAVENOUS at 02:03

## 2022-03-22 RX ADMIN — ALLOPURINOL 300 MG: 300 TABLET ORAL at 08:03

## 2022-03-22 RX ADMIN — FLUTICASONE FUROATE AND VILANTEROL TRIFENATATE 1 PUFF: 100; 25 POWDER RESPIRATORY (INHALATION) at 09:03

## 2022-03-22 RX ADMIN — NASAL 1 SPRAY: 6.5 SPRAY NASAL at 10:03

## 2022-03-22 RX ADMIN — POTASSIUM CHLORIDE 50 MEQ: 10 CAPSULE, COATED, EXTENDED RELEASE ORAL at 08:03

## 2022-03-22 RX ADMIN — SILDENAFIL 20 MG: 20 TABLET ORAL at 10:03

## 2022-03-22 RX ADMIN — FUROSEMIDE 80 MG: 10 INJECTION, SOLUTION INTRAMUSCULAR; INTRAVENOUS at 03:03

## 2022-03-22 RX ADMIN — SILDENAFIL 20 MG: 20 TABLET ORAL at 02:03

## 2022-03-22 RX ADMIN — SENNOSIDES AND DOCUSATE SODIUM 1 TABLET: 50; 8.6 TABLET ORAL at 10:03

## 2022-03-22 RX ADMIN — NASAL 1 SPRAY: 6.5 SPRAY NASAL at 02:03

## 2022-03-22 RX ADMIN — METOPROLOL TARTRATE 25 MG: 25 TABLET, FILM COATED ORAL at 10:03

## 2022-03-22 RX ADMIN — METOPROLOL TARTRATE 25 MG: 25 TABLET, FILM COATED ORAL at 08:03

## 2022-03-22 RX ADMIN — NASAL 1 SPRAY: 6.5 SPRAY NASAL at 06:03

## 2022-03-22 RX ADMIN — BOSENTAN 125 MG: 125 TABLET, FILM COATED ORAL at 10:03

## 2022-03-22 RX ADMIN — MAGNESIUM SULFATE 2 G: 2 INJECTION INTRAVENOUS at 09:03

## 2022-03-22 NOTE — SUBJECTIVE & OBJECTIVE
Interval History: Pt comfortable on BiPAP. Right Heart Cath today.     Review of Systems   Constitutional:  Positive for fatigue. Negative for chills and fever.   HENT:  Negative for sinus pressure and sinus pain.    Eyes:  Negative for visual disturbance.   Respiratory:  Positive for shortness of breath. Negative for cough and chest tightness.    Cardiovascular:  Positive for leg swelling. Negative for chest pain and palpitations.   Gastrointestinal:  Positive for abdominal distention. Negative for abdominal pain, constipation, diarrhea, nausea and vomiting.   Genitourinary:  Negative for difficulty urinating and dysuria.   Musculoskeletal:  Negative for back pain.   Skin:  Negative for rash.   Neurological:  Negative for light-headedness and headaches.   Hematological:  Does not bruise/bleed easily.   Psychiatric/Behavioral:  Negative for agitation and behavioral problems.    Objective:     Vital Signs (Most Recent):  Temp: 98 °F (36.7 °C) (03/22/22 0746)  Pulse: 100 (03/22/22 1234)  Resp: 19 (03/22/22 0900)  BP: (!) 146/70 (03/22/22 0746)  SpO2: 97 % (03/22/22 0746)   Vital Signs (24h Range):  Temp:  [97.5 °F (36.4 °C)-98.5 °F (36.9 °C)] 98 °F (36.7 °C)  Pulse:  [] 100  Resp:  [15-26] 19  SpO2:  [89 %-100 %] 97 %  BP: (113-146)/(60-70) 146/70     Weight: 133.1 kg (293 lb 6.9 oz)  Body mass index is 48.83 kg/m².    Intake/Output Summary (Last 24 hours) at 3/22/2022 1435  Last data filed at 3/22/2022 0700  Gross per 24 hour   Intake 550 ml   Output 1825 ml   Net -1275 ml      Physical Exam  Vitals and nursing note reviewed.   Constitutional:       General: He is not in acute distress.     Appearance: Normal appearance. He is obese. He is not ill-appearing.   HENT:      Head: Normocephalic and atraumatic.      Mouth/Throat:      Mouth: Mucous membranes are moist.      Pharynx: Oropharynx is clear.   Eyes:      General: No scleral icterus.     Pupils: Pupils are equal, round, and reactive to light.   Neck:       Comments: Unable to assess JVD  Cardiovascular:      Rate and Rhythm: Normal rate and regular rhythm.      Pulses: Normal pulses.      Heart sounds: Murmur heard.   Pulmonary:      Effort: Pulmonary effort is normal.      Breath sounds: Rales present. No wheezing.   Abdominal:      General: Abdomen is flat. There is distension.      Palpations: Abdomen is soft.      Tenderness: There is no abdominal tenderness.   Musculoskeletal:         General: No tenderness.      Right lower leg: Edema (2+) present.      Left lower leg: Edema (2+) present.   Lymphadenopathy:      Cervical: No cervical adenopathy.   Skin:     General: Skin is dry.      Capillary Refill: Capillary refill takes 2 to 3 seconds.   Neurological:      General: No focal deficit present.      Mental Status: He is alert and oriented to person, place, and time.   Psychiatric:         Mood and Affect: Mood normal.         Behavior: Behavior normal.       Significant Labs: All pertinent labs within the past 24 hours have been reviewed.  CBC:   Recent Labs   Lab 03/21/22  0628 03/22/22  0258   WBC 6.54 6.62   HGB 15.9 15.3   HCT 56.0* 54.3*    195     CMP:   Recent Labs   Lab 03/21/22  0628 03/22/22  0258    138   K 3.2* 3.1*   CL 84* 87*   CO2 42* 36*   GLU 83 63*   BUN 32* 40*   CREATININE 1.4 1.4   CALCIUM 9.7 9.3   ANIONGAP 11 15   EGFRNONAA 59.8* 59.8*       Significant Imaging: I have reviewed all pertinent imaging results/findings within the past 24 hours.

## 2022-03-22 NOTE — PROGRESS NOTES
[facility] Pul[facility]/Critical Care Fellow Progress Note:    Brief Hx: 46 y.o. male with a PMHx of MALLIKA on BiPAP (), PFO s/p failed repair, pulmonary HTN (I?/II/III) on bosentan and sildenafil, CHF, chronic resp failure on 3L NC, a fib on coumadin who is admitted for acute on chronic hypoxemic respiratory failure thought to be due to volume overload. He diuresed over the weekend and is doing better from a respiratory standpoint. Planned for RHC today at 1300hrs to address his PH therapy in the setting of known CHF.     Subjective:  Patient wearing BiPAP this AM and reports his breathing is fine. He was on 3.5L yesterday during the day which is close to his home dose.     Objective:  Last 24 Hour Vital Signs:  BP  Min: 110/57  Max: 146/70  Temp  Av.2 °F (36.8 °C)  Min: 97.5 °F (36.4 °C)  Max: 98.8 °F (37.1 °C)  Pulse  Av.1  Min: 81  Max: 102  Resp  Av.7  Min: 15  Max: 26  SpO2  Av.6 %  Min: 89 %  Max: 100 %  Weight  Av.1 kg (293 lb 6.9 oz)  Min: 133.1 kg (293 lb 6.9 oz)  Max: 133.1 kg (293 lb 6.9 oz)  Body mass index is 48.83 kg/m².  I/O last 3 completed shifts:  In: 1300 [P.O.:1300]  Out: 3025 [Urine:3025]        Physical Exam:  General: Sleeping with BiPAP, in NAD  HENT:  NCAT; anicteric sclera; OP clear with MMM  Cardio:  Regular rate and rhythm with normal S1 and S2; no murmurs or rubs  Resp:  CTAB; respirations unlabored; no wheezes, crackles or rhonchi  Abdom: Soft, NTND   Extrem: WWP with no clubbing, cyanosis or edema  Pulses: 2+ and symmetric distally  Neuro:  AAOx3; cooperative and pleasant with no focal deficits       Assessment & Plan:      46 y.o.  male with a PMHx of MALLIKA on BiPAP (), PFO s/p failed repair, pulmonary HTN (I/II/III) on bosentan and sildenafil, CHF, chronic resp failure on 3L NC, a fib on coumadin who is admitted with hypoxic respiratory failure that is acute on chronic and may represent worsening HF in the setting of PH on dual-therapy.     - continue  bosentan and sildenafil for now  - RHC today per HTS - will follow up on results and notify Dr. Head so we can develop a plan for his pulmonary hypertension treatment  - would continue diuresis with IV or PO with plan for slightly net negative to net even as patient seems to be approaching his home O2 requirements and euvolemia  - holding coumadin in setting of RHC today  - goal SpO2 88-90% (he is around 90% at home on 3L)      Es Alvarado MD  Pulm/CC Fellow  PGY-V

## 2022-03-22 NOTE — PLAN OF CARE
Problem: Adult Inpatient Plan of Care  Goal: Plan of Care Review  Outcome: Ongoing, Progressing  Goal: Patient-Specific Goal (Individualized)  Outcome: Ongoing, Progressing  Goal: Absence of Hospital-Acquired Illness or Injury  Outcome: Ongoing, Progressing  Goal: Optimal Comfort and Wellbeing  Outcome: Ongoing, Progressing  Goal: Readiness for Transition of Care  Outcome: Ongoing, Progressing     Problem: Bariatric Environmental Safety  Goal: Safety Maintained with Care  Outcome: Ongoing, Progressing     Problem: Skin Injury Risk Increased  Goal: Skin Health and Integrity  Outcome: Ongoing, Progressing    END OF SHIFT NOTES:  PATIENT RESTED MOST OF THE NIGHT. PATIENT WORE BIPAP FOR AT LEAST HALF OF THE NIGHT. PATIENT NPO AT MN FOR PROCEDURE TODAY. VITALS STABLE. PT ON 3.5L NC WHEN NOT ON BIPAP. PATIENT TURNS INDEPENDENTLY. NO DISTRESS NOTED. A & O X 4. BED IN LOW POSITION & CALL LIGHT WITH IN REACH.

## 2022-03-22 NOTE — INTERVAL H&P NOTE
Patient seen and examined. No interval change since last H&P. Here for a RHC to assess his hemodynamics.   Access via the right IJ  7 Fr venous sheath  Risks and benefits of the procedure were explained to the patient. All questions were answered.  Consents were signed and in the chart.    Tony Martínez MD

## 2022-03-22 NOTE — BRIEF OP NOTE
[facility] - Cath Lab  Brief Operative Note  Cardiology    SUMMARY     Surgery Date: 3/22/2022     Surgeon(s) and Role:     * Tony Martínez MD - Primary     Assisting Surgeon:  * Philomena Steinberg MD - Fellow     * Scarlet Patel MD - Fellow      Pre-op Diagnosis:  Pulmonary hypertension [I27.20]    Post-op Diagnosis: Post-Op Diagnosis Codes:     * Pulmonary hypertension [I27.20]    Procedure Performed: RHC    Description of the findings of the procedure: RHC performed via the right IJ. Patient tolerated the procedure well.  Elevated left but normal right side filling pressures (RA= 8 mm of Hg, PCWP= 25 mm of Hg). Moderate pulmonary HTN  (PA= 58/28mm of Hg, PA mean=43 mm of Hg) in the setting of elevated left sided filling pressures. Normal cardiac index and output  (CI=2.8 L/min/m2, CO=6.5 L/min).    Estimated Blood Loss: < 10 cc    Plan:   - Routine post cath-care    Tony Martínez MD

## 2022-03-22 NOTE — ASSESSMENT & PLAN NOTE
The patient takes sildenafil and bosetren for pHTN, will continue in the hospital.    Plan:  - RHC on 03/22 showed elevated left sided filling pressures   - Cont IV diuresis; plan to switch to PO lasix on 03/23.   - Pulm Consulted; appreciate recs

## 2022-03-22 NOTE — PLAN OF CARE
Patient in room, sitting on side of the bed. Patient is able to ambulate without assistance. Patient is alert and oriented x4. Patient denies any needs or concerns at this moment. Continue current plan of care

## 2022-03-23 ENCOUNTER — TELEPHONE (OUTPATIENT)
Dept: PULMONOLOGY | Facility: CLINIC | Age: 47
End: 2022-03-23
Payer: MEDICARE

## 2022-03-23 VITALS
BODY MASS INDEX: 48.67 KG/M2 | HEIGHT: 65 IN | HEART RATE: 83 BPM | SYSTOLIC BLOOD PRESSURE: 106 MMHG | TEMPERATURE: 98 F | OXYGEN SATURATION: 88 % | DIASTOLIC BLOOD PRESSURE: 57 MMHG | WEIGHT: 292.13 LBS | RESPIRATION RATE: 17 BRPM

## 2022-03-23 LAB
ANION GAP SERPL CALC-SCNC: 11 MMOL/L (ref 8–16)
BASOPHILS # BLD AUTO: 0.03 K/UL (ref 0–0.2)
BASOPHILS NFR BLD: 0.4 % (ref 0–1.9)
BUN SERPL-MCNC: 49 MG/DL (ref 6–20)
CALCIUM SERPL-MCNC: 9.9 MG/DL (ref 8.7–10.5)
CHLORIDE SERPL-SCNC: 85 MMOL/L (ref 95–110)
CO2 SERPL-SCNC: 40 MMOL/L (ref 23–29)
CREAT SERPL-MCNC: 1.5 MG/DL (ref 0.5–1.4)
DIFFERENTIAL METHOD: ABNORMAL
EOSINOPHIL # BLD AUTO: 0.1 K/UL (ref 0–0.5)
EOSINOPHIL NFR BLD: 1.7 % (ref 0–8)
ERYTHROCYTE [DISTWIDTH] IN BLOOD BY AUTOMATED COUNT: 17.8 % (ref 11.5–14.5)
EST. GFR  (AFRICAN AMERICAN): >60 ML/MIN/1.73 M^2
EST. GFR  (NON AFRICAN AMERICAN): 55 ML/MIN/1.73 M^2
GLUCOSE SERPL-MCNC: 87 MG/DL (ref 70–110)
HCT VFR BLD AUTO: 54.1 % (ref 40–54)
HGB BLD-MCNC: 15.6 G/DL (ref 14–18)
IMM GRANULOCYTES # BLD AUTO: 0.01 K/UL (ref 0–0.04)
IMM GRANULOCYTES NFR BLD AUTO: 0.1 % (ref 0–0.5)
INR PPP: 1.3 (ref 0.8–1.2)
LYMPHOCYTES # BLD AUTO: 1 K/UL (ref 1–4.8)
LYMPHOCYTES NFR BLD: 14.1 % (ref 18–48)
MAGNESIUM SERPL-MCNC: 1.9 MG/DL (ref 1.6–2.6)
MCH RBC QN AUTO: 25.4 PG (ref 27–31)
MCHC RBC AUTO-ENTMCNC: 28.8 G/DL (ref 32–36)
MCV RBC AUTO: 88 FL (ref 82–98)
MONOCYTES # BLD AUTO: 0.9 K/UL (ref 0.3–1)
MONOCYTES NFR BLD: 12.6 % (ref 4–15)
NEUTROPHILS # BLD AUTO: 4.9 K/UL (ref 1.8–7.7)
NEUTROPHILS NFR BLD: 71.1 % (ref 38–73)
NRBC BLD-RTO: 0 /100 WBC
PHOSPHATE SERPL-MCNC: 2.8 MG/DL (ref 2.7–4.5)
PLATELET # BLD AUTO: 225 K/UL (ref 150–450)
PMV BLD AUTO: 10.9 FL (ref 9.2–12.9)
POTASSIUM SERPL-SCNC: 3.6 MMOL/L (ref 3.5–5.1)
PROTHROMBIN TIME: 13.5 SEC (ref 9–12.5)
RBC # BLD AUTO: 6.15 M/UL (ref 4.6–6.2)
SODIUM SERPL-SCNC: 136 MMOL/L (ref 136–145)
WBC # BLD AUTO: 6.9 K/UL (ref 3.9–12.7)

## 2022-03-23 PROCEDURE — 36415 COLL VENOUS BLD VENIPUNCTURE: CPT

## 2022-03-23 PROCEDURE — 99239 HOSP IP/OBS DSCHRG MGMT >30: CPT | Mod: GC,,, | Performed by: STUDENT IN AN ORGANIZED HEALTH CARE EDUCATION/TRAINING PROGRAM

## 2022-03-23 PROCEDURE — 84100 ASSAY OF PHOSPHORUS: CPT | Performed by: STUDENT IN AN ORGANIZED HEALTH CARE EDUCATION/TRAINING PROGRAM

## 2022-03-23 PROCEDURE — 85610 PROTHROMBIN TIME: CPT | Performed by: STUDENT IN AN ORGANIZED HEALTH CARE EDUCATION/TRAINING PROGRAM

## 2022-03-23 PROCEDURE — 63600175 PHARM REV CODE 636 W HCPCS

## 2022-03-23 PROCEDURE — 25000003 PHARM REV CODE 250: Performed by: STUDENT IN AN ORGANIZED HEALTH CARE EDUCATION/TRAINING PROGRAM

## 2022-03-23 PROCEDURE — 1111F PR DISCHARGE MEDS RECONCILED W/ CURRENT OUTPATIENT MED LIST: ICD-10-PCS | Mod: CPTII,GC,, | Performed by: STUDENT IN AN ORGANIZED HEALTH CARE EDUCATION/TRAINING PROGRAM

## 2022-03-23 PROCEDURE — 80048 BASIC METABOLIC PNL TOTAL CA: CPT | Performed by: STUDENT IN AN ORGANIZED HEALTH CARE EDUCATION/TRAINING PROGRAM

## 2022-03-23 PROCEDURE — 85025 COMPLETE CBC W/AUTO DIFF WBC: CPT

## 2022-03-23 PROCEDURE — 25000003 PHARM REV CODE 250

## 2022-03-23 PROCEDURE — 83735 ASSAY OF MAGNESIUM: CPT | Performed by: STUDENT IN AN ORGANIZED HEALTH CARE EDUCATION/TRAINING PROGRAM

## 2022-03-23 PROCEDURE — 99239 PR HOSPITAL DISCHARGE DAY,>30 MIN: ICD-10-PCS | Mod: GC,,, | Performed by: STUDENT IN AN ORGANIZED HEALTH CARE EDUCATION/TRAINING PROGRAM

## 2022-03-23 PROCEDURE — 1111F DSCHRG MED/CURRENT MED MERGE: CPT | Mod: CPTII,GC,, | Performed by: STUDENT IN AN ORGANIZED HEALTH CARE EDUCATION/TRAINING PROGRAM

## 2022-03-23 RX ORDER — POTASSIUM CHLORIDE 20 MEQ/1
40 TABLET, EXTENDED RELEASE ORAL ONCE
Status: COMPLETED | OUTPATIENT
Start: 2022-03-23 | End: 2022-03-23

## 2022-03-23 RX ORDER — FUROSEMIDE 80 MG/1
80 TABLET ORAL 2 TIMES DAILY
Status: DISCONTINUED | OUTPATIENT
Start: 2022-03-23 | End: 2022-03-23 | Stop reason: HOSPADM

## 2022-03-23 RX ADMIN — BOSENTAN 125 MG: 125 TABLET, FILM COATED ORAL at 08:03

## 2022-03-23 RX ADMIN — ALLOPURINOL 300 MG: 300 TABLET ORAL at 08:03

## 2022-03-23 RX ADMIN — NASAL 1 SPRAY: 6.5 SPRAY NASAL at 06:03

## 2022-03-23 RX ADMIN — METOLAZONE 2.5 MG: 2.5 TABLET ORAL at 09:03

## 2022-03-23 RX ADMIN — METOPROLOL TARTRATE 25 MG: 25 TABLET, FILM COATED ORAL at 08:03

## 2022-03-23 RX ADMIN — FLUTICASONE FUROATE AND VILANTEROL TRIFENATATE 1 PUFF: 100; 25 POWDER RESPIRATORY (INHALATION) at 08:03

## 2022-03-23 RX ADMIN — POTASSIUM CHLORIDE 40 MEQ: 1500 TABLET, EXTENDED RELEASE ORAL at 08:03

## 2022-03-23 RX ADMIN — FUROSEMIDE 80 MG: 10 INJECTION, SOLUTION INTRAMUSCULAR; INTRAVENOUS at 03:03

## 2022-03-23 RX ADMIN — FUROSEMIDE 80 MG: 80 TABLET ORAL at 08:03

## 2022-03-23 RX ADMIN — SILDENAFIL 20 MG: 20 TABLET ORAL at 08:03

## 2022-03-23 NOTE — PROGRESS NOTES
[facility]/Critical [facility] Fellow Progress Note:    Brief Hx: 46 y.o. male with a PMHx of MALLIKA on BiPAP (), PFO s/p failed repair, pulmonary HTN (I?/II/III) on bosentan and sildenafil, CHF, chronic resp failure on 3L NC, a fib on coumadin who is admitted for acute on chronic hypoxemic respiratory failure thought to be due to volume overload. He diuresed over the weekend and is doing better from a respiratory standpoint. RHC 3/22 showed high PCWP with normal R sided pressures so the decision was made in conjunction with Dr. Head to stop sildenafil.     Subjective:  Patient wearing BiPAP this AM and reports his breathing is fine. He feels he is ready to go home. We discussed my conversation with Dr. Head to stop the sildenafil and he was understanding of this.     Objective:  Last 24 Hour Vital Signs:  BP  Min: 106/57  Max: 128/70  Temp  Av.8 °F (36.6 °C)  Min: 97.5 °F (36.4 °C)  Max: 98.1 °F (36.7 °C)  Pulse  Av.5  Min: 78  Max: 94  Resp  Av.7  Min: 16  Max: 19  SpO2  Av %  Min: 88 %  Max: 100 %  Weight  Av.5 kg (292 lb 1.8 oz)  Min: 132.5 kg (292 lb 1.8 oz)  Max: 132.5 kg (292 lb 1.8 oz)  Body mass index is 48.61 kg/m².  I/O last 3 completed shifts:  In: 700 [P.O.:700]  Out: 3225 [Urine:3225]        Physical Exam:  General: Sleeping with BiPAP, in NAD  HENT:  NCAT; anicteric sclera; OP clear with MMM  Cardio:  Regular rate and rhythm with normal S1 and S2; no murmurs or rubs  Resp:  CTAB; respirations unlabored; no wheezes, crackles or rhonchi  Abdom: Soft, NTND   Extrem: WWP with no clubbing, cyanosis or edema  Pulses: 2+ and symmetric distally  Neuro:  AAOx3; cooperative and pleasant with no focal deficits       Assessment & Plan:      46 y.o.  male with a PMHx of MALLIKA on BiPAP (), PFO s/p failed repair, pulmonary HTN (I/II/III) on bosentan and sildenafil, CHF, chronic resp failure on 3L NC, a fib on coumadin who is admitted with hypoxic respiratory failure  that is acute on chronic and may represent worsening HF in the setting of PH on dual-therapy.   - RHC on 3/22 showed normal R sided pressures with elevated PCWP of 25mmHg  - continue bosentan and stop sildenafil  - plan for follow up in clinic, I have notified Dr. Head  - OK to discharge from the pulmonary standpoint  - goal SpO2 88-90% (he is around 90% at home on 3L)      Es Alvarado MD  Pulm/CC Fellow  PGY-V

## 2022-03-23 NOTE — TELEPHONE ENCOUNTER
----- Message from Tatiana Dolan sent at 3/23/2022  1:31 PM CDT -----  Contact:   Pt has referral to schedule appt    Call back @[phone]

## 2022-03-23 NOTE — PLAN OF CARE
No Social Service needs identified at this time.       03/23/22 1249   Post-Acute Status   Post-Acute Authorization Other   Other Status No Post-Acute Service Needs   Discharge Delays None known at this time   Discharge Plan   Discharge Plan A Home   Discharge Plan B Home     Cristina Ruiz LMSW  PRN-  [facility]  Ext. [phone]

## 2022-03-23 NOTE — NURSING
Patient discharge instruction provided. IV lines removed and montior. Patient alert and oriented x4. No distress or discomfort noted.

## 2022-03-23 NOTE — TELEPHONE ENCOUNTER
Spoke with patient, informed him that I'm contacting him in regards to scheduling his appointment on 5/19/22 for 10:00am with Dr Adams and also to place patient appointment on wait list. Patient verbalized that he understand and accepted the appointment.

## 2022-03-23 NOTE — CARE UPDATE
[facility] scheduled pt's hospital f/u visit on 3/29/22 @ 9:30am and Cardiology on 4/6/22 @ 1:40pm. Pulmonary and Pulmonary hypertension will reach out to pt to schedule hospital f/u appointments.

## 2022-03-23 NOTE — DISCHARGE SUMMARY
[facility] - Telemetry [facility] Medicine  Discharge Summary      Patient Name: Yong Mcintyre  MRN: [id number]  Patient Class: IP- Inpatient  Admission Date: 3/17/2022  Hospital Length of Stay: 5 days  Discharge Date and Time:  03/23/2022 11:50 AM  Attending Physician: Zabrina Leos MD   Discharging Provider: Valdez Nielsen MD  Primary Care Provider: Gianni Escalona MD  Hospital Medicine Team: Networked reference to record PCT  Valdez Nielsen MD    HPI:   Patient is a 46-year-old male with past medical history significant for PFO, cor pulmonale, Pickwickian syndrome, pulmonary hypertension, obese hypoventilation syndrome, morbid obesity with BMI between 50 and 60, congestive heart failure, long-term use of anticoagulation, high blood pressure who is presenting with 24 hour history of chest pain and shortness of breath.  Patient states that he has been having worsening substernal chest pressure throughout the day.  It does not appear to be exertional. It waxes and wanes with episodes lasting 15-25 minutes patient is currently asymptomatic at this time.  Nothing relieves the pain and it does not appear to be positional.  A VBG was done on the patient which noted a pCO2 of 88 on the patient's home oxygen.  He has a history of chronic hypoxic respiratory failure and requires 3 L of oxygen around the clock.  Additionally he has pulmonary hypertension and boseten and sildenafil.  The patient was admitted for observation and to evaluate for highly suspicious chest pain.    Patient was diagnosed with clinically significant PFO in 2006.  He underwent attempts via the neck to repair the hole which were ultimately unsuccessful.  The patient has developed atrial fibrillation and this required long-term anticoagulation with warfarin.  The VBG on admission shows a pH of 7.336 with the CO2 of 88 indicating a large component of chronic hypercapnia.  The patient is hemodynamically stable and on his home oxygen.  His  SpO2 on 3 L fluctuates between 89 and 95%.  He is currently asymptomatic and appears comfortable.      Procedure(s) (LRB):  INSERTION, CATHETER, RIGHT HEART (Right)      Hospital Course:   47 yo M admitted to [facility] with acute on chronic hypoxic and hypercapnic respiratory failure secondary to ADHF. S/p 120 IV Lasix BID for pulmonary edema. Weaning oxygen as tolerated and wearing BiPAP at night. HTS consulted for Right heart cath which was performed on 3/22. RA 8 Pulm pressures 58/28 (45); wedge 25. Per pulm recs Sildenafil was discontinued and pt transitioned to po lasix on 3/23 given euvolemia. Will dc home with bosentan, po lasix 80 mg bid and metolazone 2.5 mg TID. He is to follow up with outpatient pulm/phtn clinic, and cardiology in addition to pcp in 1 week.      Vitals:    03/23/22 1146   BP: (!) 106/57   Pulse: 83   Resp: 17   Temp: 97.6 °F (36.4 °C)       Physical Exam  Vitals and nursing note reviewed.   Constitutional:       General: He is not in acute distress.     Appearance: Normal appearance. He is obese. He is not ill-appearing.   HENT:      Head: Normocephalic and atraumatic.      Mouth/Throat:      Mouth: Mucous membranes are moist.      Pharynx: Oropharynx is clear.   Eyes:      General: No scleral icterus.     Pupils: Pupils are equal, round, and reactive to light.   Neck:      Comments: JVP to the level of clavicle  Cardiovascular:      Rate and Rhythm: Normal rate and regular rhythm.      Pulses: Normal pulses.      Heart sounds: Murmur heard.   Pulmonary:      Effort: Pulmonary effort is normal.      Breath sounds: Rales present. No wheezing.   Abdominal:      General: Abdomen is flat. There is distension.      Palpations: Abdomen is soft.      Tenderness: There is no abdominal tenderness.   Musculoskeletal:         General: No tenderness.      Right lower leg: Trace Edema present.      Left lower leg: Trace Edemapresent.   Lymphadenopathy:      Cervical: No cervical adenopathy.   Skin:      General: Skin is dry.   Neurological:      General: No focal deficit present.      Mental Status: He is alert and oriented to person, place, and time.   Psychiatric:         Mood and Affect: Mood normal.         Behavior: Behavior normal.   Goals of Care Treatment Preferences:  Code Status: Full Code      Consults:   Consults (From admission, onward)        Status Ordering Provider     Inpatient consult to Heart Transplant  Once        Provider:  (Not yet assigned)    Completed URI FORTE     Inpatient consult to Pulmonology  Once        Provider:  (Not yet assigned)    Completed SAMANTA WOOD     Inpatient consult to Cardiology  Once        Provider:  (Not yet assigned)    Completed BARTOLO BEJARANO          No new Assessment & Plan notes have been filed under this hospital service since the last note was generated.  Service: Hospital Medicine    Final Active Diagnoses:    Diagnosis Date Noted POA    PRINCIPAL PROBLEM:  Acute and chronic respiratory failure with hypercapnia [J96.22] 02/18/2014 Yes    Chest pain, rule out acute myocardial infarction [R07.9] 04/13/2016 Yes    Paroxysmal atrial fibrillation [I48.0] 04/13/2016 Yes    Hypertension [I10]  Yes    Cor pulmonale [I27.81] 11/05/2012 Yes    Pulmonary hypertension [I27.20] 11/05/2012 Yes    PFO (patent foramen ovale) [Q21.1] 11/05/2012 Not Applicable    Morbid obesity with BMI of 50.0-59.9, adult [E66.01, Z68.43] 11/05/2012 Not Applicable    Pickwickian syndrome [E66.2] 11/05/2012 Yes    Long term current use of anticoagulant therapy [Z79.01] 07/02/2012 Not Applicable      Problems Resolved During this Admission:       Discharged Condition: stable    Disposition:     Follow Up:    Patient Instructions:      Basic Metabolic Panel   Standing Status: Future Standing Exp. Date: 06/23/22     Ambulatory referral/consult to Cardiology   Standing Status: Future   Referral Priority: Routine Referral Type: Consultation   Referral Reason: Specialty Services  Required   Requested Specialty: Cardiology     Ambulatory referral/consult to Pulmonary Hypertension   Standing Status: Future   Referral Priority: Routine Referral Type: Consultation   Referral Reason: Specialty Services Required   Requested Specialty: Transplant   Number of Visits Requested: 1       Significant Diagnostic Studies:     Pending Diagnostic Studies:     Procedure Component Value Units Date/Time    HIV 1/2 Ag/Ab (4th Gen) [[id number]] Collected: 03/18/22 0020    Order Status: Sent Lab Status: In process Updated: 03/18/22 0041    Specimen: Blood          Medications:  Reconciled Home Medications:      Medication List      CHANGE how you take these medications    warfarin 10 MG tablet  Commonly known as: COUMADIN  TAKE 1 TABLET BY MOUTH EVERY DAY EXCEPT TAKE 1 1/2 TABLET EVERY THURSDAY OR AS DIRECTED  What changed:   · additional instructions  · Another medication with the same name was removed. Continue taking this medication, and follow the directions you see here.        CONTINUE taking these medications    ADVAIR DISKUS 250-50 mcg/dose diskus inhaler  Generic drug: fluticasone-salmeterol 250-50 mcg/dose  Inhale 1 puff into the lungs 2 (two) times daily. Controller     albuterol 90 mcg/actuation inhaler  Commonly known as: VENTOLIN HFA  Inhale 2 puffs into the lungs every 4 (four) hours as needed for Wheezing. Rescue     allopurinoL 300 MG tablet  Commonly known as: ZYLOPRIM  Take 1 tablet (300 mg total) by mouth once daily.     bosentan 125 MG Tab  Commonly known as: TRACLEER  TAKE 1 TABLET BY MOUTH TWICE A DAY     furosemide 80 MG tablet  Commonly known as: LASIX  TAKE 1 TABLET(80 MG) BY MOUTH TWICE DAILY     metOLazone 2.5 MG tablet  Commonly known as: ZAROXOLYN  Take 1 tablet (2.5 mg total) by mouth 3 (three) times a week.     metoprolol tartrate 25 MG tablet  Commonly known as: LOPRESSOR  TAKE 1 TABLET BY MOUTH TWICE DAILY     NASACORT ALLERGY NASL  2 sprays by Nasal route once daily.      ONE-A-DAY MEN'S COMPLETE ORAL  Take 1 tablet by mouth once daily.     potassium chloride 10 MEQ Cpsr  Commonly known as: MICRO-K  TAKE 1 CAPSULE(10 MEQ) BY MOUTH EVERY DAY     tiotropium 18 mcg inhalation capsule  Commonly known as: SPIRIVA  Inhale 18 mcg into the lungs once daily.     TYLENOL ARTHRITIS ORAL  Take 2 tablets by mouth daily as needed (pain).        STOP taking these medications    magnesium oxide 400 mg (241.3 mg magnesium) tablet  Commonly known as: MAG-OX     sildenafil 20 mg Tab  Commonly known as: REVATIO     TYLENOL COLD SEVERE CONGEST ORAL            Indwelling Lines/Drains at time of discharge:   Lines/Drains/Airways     None                 Time spent on the discharge of patient: 35 minutes         Valdez Nielsen MD  Department of Hospital Medicine  [facility] - Telemetry Stepdown

## 2022-03-23 NOTE — PLAN OF CARE
[facility] - Telemetry Stepdown  Discharge Final Note    Primary Care Provider: Gianni Escalona MD    Expected Discharge Date: 3/23/2022    Final Discharge Note (most recent)     Final Note - 03/23/22 1327        Final Note    Assessment Type Final Discharge Note     Anticipated Discharge Disposition Home or Self Care     Hospital Resources/Appts/Education Provided Appointments scheduled and added to AVS        Post-Acute Status    Post-Acute Authorization Other     Other Status No Post-Acute Service Needs     Discharge Delays None known at this time                 Important Message from Medicare  Important Message from Medicare regarding Discharge Appeal Rights: Given to patient/caregiver, Explained to patient/caregiver, Signed/date by patient/caregiver     Date IMM was signed: 03/23/22  Time IMM was signed: 1317    Contact Info     Gianni Escalona MD   Specialty: Internal Medicine   Relationship: PCP - General    [address]  [facility], [facility] [address]  [place] [address]   Phone: [phone]       Next Steps: Follow up in 1 week(s)    Instructions: Hospital discharge follow up        No Social Service needs identified at this time.     Cristina Ruiz LMSW  PRN-  [facility]  Ext. [phone]

## 2022-03-23 NOTE — PLAN OF CARE
Problem: Adult Inpatient Plan of Care  Goal: Plan of Care Review  Outcome: Ongoing, Progressing  Goal: Patient-Specific Goal (Individualized)  Outcome: Ongoing, Progressing  Goal: Absence of Hospital-Acquired Illness or Injury  Outcome: Ongoing, Progressing  Goal: Optimal Comfort and Wellbeing  Outcome: Ongoing, Progressing  Goal: Readiness for Transition of Care  Outcome: Ongoing, Progressing     Problem: Bariatric Environmental Safety  Goal: Safety Maintained with Care  Outcome: Ongoing, Progressing     Problem: Skin Injury Risk Increased  Goal: Skin Health and Integrity  Outcome: Ongoing, Progressing     Problem: Fall Injury Risk  Goal: Absence of Fall and Fall-Related Injury  Outcome: Ongoing, Progressing    END OF SHIFT NOTES:  PATIENT RESTED MOST OF THE NIGHT. NO DISTRESS NOTED. VITALS STABLE. PATIENT TURNS SELF Q 2. A & O X 4. PATIENT WORE BIPAP OCCASIONALLY LAST. WHEN NOT ON BIPAP PATIENT WAS ON 3LNC W/ SATS RANGING FROM 88-94 ON CONTINUOUS PULSE OX. CALL LIGHT WITH IN REACH & BED IN LOW POSITION.

## 2022-03-23 NOTE — PROGRESS NOTES
[facility] - Telemetry [facility] Medicine  Progress Note    Patient Name: Yong Mcintyre  MRN: [id number]  Patient Class: IP- Inpatient   Admission Date: 3/17/2022  Length of Stay: 4 days  Attending Physician: Zabrina Leos MD  Primary Care Provider: Gianni Escalona MD        Subjective:     Principal Problem:Acute and chronic respiratory failure with hypercapnia        HPI:  Patient is a 46-year-old male with past medical history significant for PFO, cor pulmonale, Pickwickian syndrome, pulmonary hypertension, obese hypoventilation syndrome, morbid obesity with BMI between 50 and 60, congestive heart failure, long-term use of anticoagulation, high blood pressure who is presenting with 24 hour history of chest pain and shortness of breath.  Patient states that he has been having worsening substernal chest pressure throughout the day.  It does not appear to be exertional. It waxes and wanes with episodes lasting 15-25 minutes patient is currently asymptomatic at this time.  Nothing relieves the pain and it does not appear to be positional.  A VBG was done on the patient which noted a pCO2 of 88 on the patient's home oxygen.  He has a history of chronic hypoxic respiratory failure and requires 3 L of oxygen around the clock.  Additionally he has pulmonary hypertension and boseten and sildenafil.  The patient was admitted for observation and to evaluate for highly suspicious chest pain.    Patient was diagnosed with clinically significant PFO in 2006.  He underwent attempts via the neck to repair the hole which were ultimately unsuccessful.  The patient has developed atrial fibrillation and this required long-term anticoagulation with warfarin.  The VBG on admission shows a pH of 7.336 with the CO2 of 88 indicating a large component of chronic hypercapnia.  The patient is hemodynamically stable and on his home oxygen.  His SpO2 on 3 L fluctuates between 89 and 95%.  He is currently asymptomatic and appears  comfortable.      Overview/Hospital Course:  45 yo M admitted to [facility] with acute on chronic respiratory failure secondary to pulmonary hypertension. Pt being diuresed 120 IV Lasix BID for pulmonary edema. Weaning oxygen as tolerated and wearing BiPAP at night. [facility] consulted for Right heart cath. Continuing 80 IV lasix BID- pt noted to be euvolemic on 03/21. Due to euvolemia and therapeutic INR level, pt to have RHC on 03/22.       Interval History: Pt comfortable on BiPAP. Right Heart Cath today.     Review of Systems   Constitutional:  Positive for fatigue. Negative for chills and fever.   HENT:  Negative for sinus pressure and sinus pain.    Eyes:  Negative for visual disturbance.   Respiratory:  Positive for shortness of breath. Negative for cough and chest tightness.    Cardiovascular:  Positive for leg swelling. Negative for chest pain and palpitations.   Gastrointestinal:  Positive for abdominal distention. Negative for abdominal pain, constipation, diarrhea, nausea and vomiting.   Genitourinary:  Negative for difficulty urinating and dysuria.   Musculoskeletal:  Negative for back pain.   Skin:  Negative for rash.   Neurological:  Negative for light-headedness and headaches.   Hematological:  Does not bruise/bleed easily.   Psychiatric/Behavioral:  Negative for agitation and behavioral problems.    Objective:     Vital Signs (Most Recent):  Temp: 98 °F (36.7 °C) (03/22/22 0746)  Pulse: 100 (03/22/22 1234)  Resp: 19 (03/22/22 0900)  BP: (!) 146/70 (03/22/22 0746)  SpO2: 97 % (03/22/22 0746)   Vital Signs (24h Range):  Temp:  [97.5 °F (36.4 °C)-98.5 °F (36.9 °C)] 98 °F (36.7 °C)  Pulse:  [] 100  Resp:  [15-26] 19  SpO2:  [89 %-100 %] 97 %  BP: (113-146)/(60-70) 146/70     Weight: 133.1 kg (293 lb 6.9 oz)  Body mass index is 48.83 kg/m².    Intake/Output Summary (Last 24 hours) at 3/22/2022 1435  Last data filed at 3/22/2022 0700  Gross per 24 hour   Intake 550 ml   Output 1825 ml   Net -1275 ml       Physical Exam  Vitals and nursing note reviewed.   Constitutional:       General: He is not in acute distress.     Appearance: Normal appearance. He is obese. He is not ill-appearing.   HENT:      Head: Normocephalic and atraumatic.      Mouth/Throat:      Mouth: Mucous membranes are moist.      Pharynx: Oropharynx is clear.   Eyes:      General: No scleral icterus.     Pupils: Pupils are equal, round, and reactive to light.   Neck:      Comments: Unable to assess JVD  Cardiovascular:      Rate and Rhythm: Normal rate and regular rhythm.      Pulses: Normal pulses.      Heart sounds: Murmur heard.   Pulmonary:      Effort: Pulmonary effort is normal.      Breath sounds: Rales present. No wheezing.   Abdominal:      General: Abdomen is flat. There is distension.      Palpations: Abdomen is soft.      Tenderness: There is no abdominal tenderness.   Musculoskeletal:         General: No tenderness.      Right lower leg: Edema (2+) present.      Left lower leg: Edema (2+) present.   Lymphadenopathy:      Cervical: No cervical adenopathy.   Skin:     General: Skin is dry.      Capillary Refill: Capillary refill takes 2 to 3 seconds.   Neurological:      General: No focal deficit present.      Mental Status: He is alert and oriented to person, place, and time.   Psychiatric:         Mood and Affect: Mood normal.         Behavior: Behavior normal.       Significant Labs: All pertinent labs within the past 24 hours have been reviewed.  CBC:   Recent Labs   Lab 03/21/22  0628 03/22/22  0258   WBC 6.54 6.62   HGB 15.9 15.3   HCT 56.0* 54.3*    195     CMP:   Recent Labs   Lab 03/21/22  0628 03/22/22  0258    138   K 3.2* 3.1*   CL 84* 87*   CO2 42* 36*   GLU 83 63*   BUN 32* 40*   CREATININE 1.4 1.4   CALCIUM 9.7 9.3   ANIONGAP 11 15   EGFRNONAA 59.8* 59.8*       Significant Imaging: I have reviewed all pertinent imaging results/findings within the past 24 hours.      Assessment/Plan:      * Acute and chronic  respiratory failure with hypercapnia  Patient with Hypercapnic Respiratory failure which is Acute on chronic.  he is on home oxygen at 3 LPM. Supplemental oxygen was provided and noted- Oxygen Concentration (%):  [60] 60.   Signs/symptoms of respiratory failure include- N/A. Asymptomatic. Contributing diagnoses includes - CHF, Obesity Hypoventilation and structure heart disease, pulmonary hypertension Labs and images were reviewed. Patient Has not had a recent ABG. Will treat underlying causes and adjust management of respiratory failure.    Plan:  - 80 IV Lasix BID  - RHC on 03/22 after adequate diuresis  - CMP daily    Paroxysmal atrial fibrillation  Patient with Paroxysmal (<7 days) atrial fibrillation which is controlled currently with Beta Blocker. Patient is currently in sinus rhythm.[id number] Score: The patient doesn't have any registry metric data available. Anticoagulation indicated. Anticoagulation done with warfarin.      Plan:  - Rate control with target HR <110  - Metoprolol tartrate vs diltiazem  - Hold for SBP <90/60 or P<45 bpm  - Patient's [id number] score is 2, annual risk of stroke is 2.9  - Score > 1 requires anticoagulation preferably with Warfarin  - HAS-BLED Score:  - Hypertension (+1)  Yes  - Impaired Renal Function (Dialysis, transplant, Cr >2.26 mg/dL) (+1)  No  - Impaired Liver Function (Cirrhosis or bilirubin >2x normal with AST/ALT/AP >3x normal) (+1)  No  - History of Stroke (+1)  No  - History of Bleeding (+1)  No  - Labile INRs (Unstable/high INRs, time in therapeutic range <60%) (+1)  No  - >65 years (+1)  No  - Drugs (Antiplatelet agents, NSAIDs) (+1)  No  - Alcohol Consumption (+1)  No    - Total Score:  1  - Risk of bleeding: 3.4%    - Score >=3 indicates high bleeding risk.  - HAS-BLED > CHADVAS indicates risk greater than benefit.  - Magnesium > 2, Potassium > 4  - Continuous telemetry  - TTE  - Cardiology consult and evaluation, recs appreciated                Chest pain,  rule out acute myocardial infarction  Patient is a 46-year-old male with previous structural heart disease, morbid obesity, and pulmonary hypertension coming in with high risk chest pain.  He describes it as substernal pressure which waxes and wanes.  He is currently asymptomatic.  EKG shows no changes.  Initial troponins and BNP are low however in the setting of morbid obesity this could be artificial.  He is not requiring any more oxygen than normal however of VBG revealed hypercapnia with mild respiratory acidosis overlying chronic compensated respiratory acidosis.              Hypertension  Essential.     Plan:  - metoprolol 25 BID  - Target /80 unless otherwise indicated  - Lifestyle modifications (DASH diet, Mediterranean diet, weight loss with target BMI 18-25, at least 150 minute moderate intensity exercise/week)  - Risk factor modification (smoking cessation, HbA1C < 6.5, Minimize alcohol intake with no more than 1-2 glasses of beer or wine per day or 10 or less drinks per week)  - Statin therapy as indicated by The 10-year ASCVD risk score (Tiff MENDIOLA Jr., et al., 2013) is: 6.4%    Values used to calculate the score:      Age: 46 years      Sex: Male      Is Non- : Yes      Diabetic: No      Tobacco smoker: No      Systolic Blood Pressure: 124 mmHg      Is BP treated: Yes      HDL Cholesterol: 50 mg/dL      Total Cholesterol: 162 mg/dL  - Target LDL < 130, HDL > 40  - Magnesium > 2, Potassium > 4        PFO (patent foramen ovale)  This is the cause of the patient developing structural heart disease.  There was an attempt in 2006 to corrected which ultimately failed      Pulmonary hypertension  The patient takes sildenafil and bosetren for pHTN, will continue in the hospital.    Plan:  - RHC on 03/22 showed elevated left sided filling pressures   - Cont IV diuresis; plan to switch to PO lasix on 03/23.   - Pulm Consulted; appreciate recs    Cor pulmonale  Patient does tell [facility] for  his cardiology appointments.  He takes warfarin and most of his INRs have been well within range.  He is compliant with his medications.  The patient takes warfarin 10 mg every day except Thursday for which he takes 5 mg.       TTE 3/29/19:  · Mildly decreased left ventricular systolic function. The estimated ejection fraction is 48% ( probably upper 40s to at most low 50s but lower on the auto EF)  · Mild global hypokinetic wall motion.  · Septal wall has abnormal motion.  · Grade I (mild) left ventricular diastolic dysfunction consistent with impaired relaxation.  · Normal left atrial pressure.  · Mild right ventricular enlargement.  · Normal right ventricular systolic function.  · Mild pulmonic regurgitation.  · Normal central venous pressure (3 mm Hg).  · The estimated PA systolic pressure is 20 mm Hg          Plan:  - furosemide 80 mg BID IV  - Avoid salt intake > 2 g/day  - Strict I's/O's  - Daily standing weights  - Compression stockings as tolerate  - magnesium > 2, potassium > 4  - Goal directed medical therapy as indicated including:    - metoprolol        Pickwickian syndrome  Morbid obesity complicates all aspects of disease management from diagnostic modalities to treatment. Weight loss encouraged and health benefits explained to patient.     - BiPAP QHS as tolerated        Long term current use of anticoagulant therapy  Continue anticoagulation to maintain INR between 2 and 3        VTE Risk Mitigation (From admission, onward)         Ordered     IP VTE HIGH RISK PATIENT  Once         03/18/22 0318     Place sequential compression device  Until discontinued         03/18/22 0318     Reason for No Pharmacological VTE Prophylaxis  Once        Question:  Reasons:  Answer:  Already adequately anticoagulated on oral Anticoagulants    03/18/22 0318                Discharge Planning   ESTEBAN: 3/24/2022     Code Status: Full Code   Is the patient medically ready for discharge?: No    Reason for patient still in  hospital (select all that apply): Patient trending condition  Discharge Plan A: Home with family                  Carter Blackmon MD  Department of Hospital Medicine   [facility] - Telemetry Stepdown

## 2022-03-24 LAB — HIV 1+2 AB+HIV1 P24 AG SERPL QL IA: NEGATIVE

## 2022-03-24 NOTE — PROGRESS NOTES
"We received the following message yesterday 3/23/22 from inpatient hospital:    "Patient will be d/c'ed today, 3/23. He is already a [facility] patient. He had warfarin held in order to get Right Heart Cath procedure. He will resume warfarin tonight, but team has chosen no need to bridge. Please touch base with him.  "    We will reschedule his INR to next week for follow up      "

## 2022-03-25 ENCOUNTER — PATIENT OUTREACH (OUTPATIENT)
Dept: ADMINISTRATIVE | Facility: CLINIC | Age: 47
End: 2022-03-25
Payer: MEDICARE

## 2022-03-25 NOTE — PROGRESS NOTES
C3 nurse spoke with Yong Mcintyre for a TCC post hospital discharge follow up call. The patient has a scheduled [facility] appointment with Adán Ferrer MD on 03/29/22 @ 2904.

## 2022-03-25 NOTE — PROGRESS NOTES
C3 nurse attempted to contact Yong Mcintyre for a TCC post hospital discharge follow up call. The patient is unable to conduct the call @ this time. The patient requested a callback.    The patient has a scheduled [facility] appointment with Adán Ferrer MD on 03/29/22 @ 4240. Message sent to Physician staff.

## 2022-03-25 NOTE — PATIENT INSTRUCTIONS
[facility] teaching reviewed with Yong Mcintyre . He verbalized understanding.    Education was provided based on the patient's discharge diagnosis using the attached [facility] patient education as a reference.

## 2022-03-28 ENCOUNTER — TELEPHONE (OUTPATIENT)
Dept: TRANSPLANT | Facility: CLINIC | Age: 47
End: 2022-03-28
Payer: MEDICARE

## 2022-03-30 ENCOUNTER — LAB VISIT (OUTPATIENT)
Dept: LAB | Facility: HOSPITAL | Age: 47
End: 2022-03-30
Attending: INTERNAL MEDICINE
Payer: MEDICARE

## 2022-03-30 DIAGNOSIS — I27.9 CHRONIC PULMONARY HEART DISEASE: ICD-10-CM

## 2022-03-30 DIAGNOSIS — Z79.01 LONG TERM CURRENT USE OF ANTICOAGULANT THERAPY: ICD-10-CM

## 2022-03-30 LAB
INR PPP: 2.4 (ref 0.8–1.2)
PROTHROMBIN TIME: 23.5 SEC (ref 9–12.5)

## 2022-03-30 PROCEDURE — 85610 PROTHROMBIN TIME: CPT | Performed by: INTERNAL MEDICINE

## 2022-03-30 PROCEDURE — 36415 COLL VENOUS BLD VENIPUNCTURE: CPT | Performed by: INTERNAL MEDICINE

## 2022-03-31 ENCOUNTER — ANTI-COAG VISIT (OUTPATIENT)
Dept: CARDIOLOGY | Facility: CLINIC | Age: 47
End: 2022-03-31
Payer: MEDICARE

## 2022-03-31 DIAGNOSIS — Z79.01 LONG TERM CURRENT USE OF ANTICOAGULANT THERAPY: ICD-10-CM

## 2022-03-31 DIAGNOSIS — I27.9 CHRONIC PULMONARY HEART DISEASE: Primary | ICD-10-CM

## 2022-03-31 PROCEDURE — 93793 ANTICOAG MGMT PT WARFARIN: CPT | Mod: S$GLB,,, | Performed by: PHARMACIST

## 2022-03-31 PROCEDURE — 93793 PR ANTICOAGULANT MGMT FOR PT TAKING WARFARIN: ICD-10-PCS | Mod: S$GLB,,, | Performed by: PHARMACIST

## 2022-04-01 ENCOUNTER — PATIENT OUTREACH (OUTPATIENT)
Dept: ADMINISTRATIVE | Facility: OTHER | Age: 47
End: 2022-04-01
Payer: MEDICARE

## 2022-04-01 DIAGNOSIS — Z12.11 ENCOUNTER FOR FIT (FECAL IMMUNOCHEMICAL TEST) SCREENING: ICD-10-CM

## 2022-04-01 NOTE — PROGRESS NOTES
Care Everywhere: updated  Immunization: updated  Health Maintenance: updated  Media Review:   Legacy Review:   DIS:  Order placed: fit kit   Upcoming appts:  EFAX:  Task Tickets:  Referrals:

## 2022-04-05 ENCOUNTER — OFFICE VISIT (OUTPATIENT)
Dept: SLEEP MEDICINE | Facility: CLINIC | Age: 47
End: 2022-04-05
Payer: MEDICARE

## 2022-04-05 DIAGNOSIS — I48.0 PAROXYSMAL ATRIAL FIBRILLATION: ICD-10-CM

## 2022-04-05 DIAGNOSIS — I27.20 PULMONARY HYPERTENSION: ICD-10-CM

## 2022-04-05 DIAGNOSIS — G47.33 OSA (OBSTRUCTIVE SLEEP APNEA): Primary | ICD-10-CM

## 2022-04-05 PROCEDURE — 1111F PR DISCHARGE MEDS RECONCILED W/ CURRENT OUTPATIENT MED LIST: ICD-10-PCS | Mod: CPTII,95,, | Performed by: NURSE PRACTITIONER

## 2022-04-05 PROCEDURE — 3044F PR MOST RECENT HEMOGLOBIN A1C LEVEL <7.0%: ICD-10-PCS | Mod: CPTII,95,, | Performed by: NURSE PRACTITIONER

## 2022-04-05 PROCEDURE — 3044F HG A1C LEVEL LT 7.0%: CPT | Mod: CPTII,95,, | Performed by: NURSE PRACTITIONER

## 2022-04-05 PROCEDURE — 1111F DSCHRG MED/CURRENT MED MERGE: CPT | Mod: CPTII,95,, | Performed by: NURSE PRACTITIONER

## 2022-04-05 PROCEDURE — 99214 PR OFFICE/OUTPT VISIT, EST, LEVL IV, 30-39 MIN: ICD-10-PCS | Mod: CR,95,, | Performed by: NURSE PRACTITIONER

## 2022-04-05 PROCEDURE — 99214 OFFICE O/P EST MOD 30 MIN: CPT | Mod: CR,95,, | Performed by: NURSE PRACTITIONER

## 2022-04-05 NOTE — PROGRESS NOTES
Mr. Mcintyre returns to the clinic today for followup on obstructive sleep apnea. He continues to wear his BPAP 16/11 with 3 L at night (continuous O2). Reports BPAP is still helping him; denies snoring.   Fifi Hernandez, permanent filter dirty, stopped zquill. Can't sleep w/o his machine  Remote 60davg 7:25h/night  AHI 3.2, fixed 16/11cm replacement machine  Was in hospital 5d recently      PSG 5/29/08:  and SaO2 of 62% (sleep efficiency 90.7%).  CPAP titration on 10/13/11: Effective control of respiratory events was achieved at 12/8 with best results achieved at 14/9 cm of H2O. Weight 250 lbs.  Titration study 4/30/14: Effective control of sleep disordered breathing was seen in supine REM sleep on 16/11 cm H2O. EPAP lower than 11 cm H2O was associated obstructive apneas. Higher IPAP 17-18 cm H2O were associated with central apneas    IMPRESSION:   1. Obstructive sleep apnea - overlap syndrome. Tolerates 16/11with 3 LO2. Continues to benefit from therapy AHI<5  2. Chronic Pulmonary  Disease, pulmonary HTN   3. Morbid obesity  Paroxysmal atrial fibrillation      PLAN:  Continue BPAP 16/11cm. Continue  3L O2 with pulm f/u as advised.     Discussed health benefits of continued BPAP compliance   See cards as advised/continue meds  RTC 1-yrs, sooner if needed.

## 2022-04-06 ENCOUNTER — TELEPHONE (OUTPATIENT)
Dept: TRANSPLANT | Facility: CLINIC | Age: 47
End: 2022-04-06
Payer: MEDICARE

## 2022-04-06 ENCOUNTER — OFFICE VISIT (OUTPATIENT)
Dept: CARDIOLOGY | Facility: CLINIC | Age: 47
End: 2022-04-06
Payer: MEDICARE

## 2022-04-06 ENCOUNTER — LAB VISIT (OUTPATIENT)
Dept: LAB | Facility: HOSPITAL | Age: 47
End: 2022-04-06
Attending: INTERNAL MEDICINE
Payer: MEDICARE

## 2022-04-06 VITALS
BODY MASS INDEX: 48.3 KG/M2 | HEIGHT: 65 IN | HEART RATE: 96 BPM | DIASTOLIC BLOOD PRESSURE: 61 MMHG | SYSTOLIC BLOOD PRESSURE: 118 MMHG | WEIGHT: 289.88 LBS

## 2022-04-06 DIAGNOSIS — I27.20 PULMONARY HYPERTENSION: ICD-10-CM

## 2022-04-06 DIAGNOSIS — R06.82 TACHYPNEA: Primary | ICD-10-CM

## 2022-04-06 DIAGNOSIS — Z79.899 POLYPHARMACY: ICD-10-CM

## 2022-04-06 DIAGNOSIS — I27.9 CHRONIC PULMONARY HEART DISEASE: ICD-10-CM

## 2022-04-06 DIAGNOSIS — I48.0 PAROXYSMAL ATRIAL FIBRILLATION: ICD-10-CM

## 2022-04-06 DIAGNOSIS — I50.9 ACUTE DECOMPENSATED HEART FAILURE: ICD-10-CM

## 2022-04-06 DIAGNOSIS — I10 PRIMARY HYPERTENSION: ICD-10-CM

## 2022-04-06 DIAGNOSIS — Z79.01 LONG TERM CURRENT USE OF ANTICOAGULANT THERAPY: ICD-10-CM

## 2022-04-06 DIAGNOSIS — Z99.81 OXYGEN DEPENDENT: ICD-10-CM

## 2022-04-06 DIAGNOSIS — I50.32 CHRONIC DIASTOLIC HEART FAILURE: Primary | ICD-10-CM

## 2022-04-06 LAB
INR PPP: 2.8 (ref 0.8–1.2)
PROTHROMBIN TIME: 27.1 SEC (ref 9–12.5)

## 2022-04-06 PROCEDURE — 99999 PR PBB SHADOW E&M-EST. PATIENT-LVL V: CPT | Mod: PBBFAC,GC,, | Performed by: INTERNAL MEDICINE

## 2022-04-06 PROCEDURE — 99204 OFFICE O/P NEW MOD 45 MIN: CPT | Mod: GC,S$GLB,, | Performed by: INTERNAL MEDICINE

## 2022-04-06 PROCEDURE — 99999 PR PBB SHADOW E&M-EST. PATIENT-LVL V: ICD-10-PCS | Mod: PBBFAC,GC,, | Performed by: INTERNAL MEDICINE

## 2022-04-06 PROCEDURE — 1111F PR DISCHARGE MEDS RECONCILED W/ CURRENT OUTPATIENT MED LIST: ICD-10-PCS | Mod: CPTII,GC,S$GLB, | Performed by: INTERNAL MEDICINE

## 2022-04-06 PROCEDURE — 36415 COLL VENOUS BLD VENIPUNCTURE: CPT | Performed by: INTERNAL MEDICINE

## 2022-04-06 PROCEDURE — 1111F DSCHRG MED/CURRENT MED MERGE: CPT | Mod: CPTII,GC,S$GLB, | Performed by: INTERNAL MEDICINE

## 2022-04-06 PROCEDURE — 85610 PROTHROMBIN TIME: CPT | Performed by: INTERNAL MEDICINE

## 2022-04-06 PROCEDURE — 99204 PR OFFICE/OUTPT VISIT, NEW, LEVL IV, 45-59 MIN: ICD-10-PCS | Mod: GC,S$GLB,, | Performed by: INTERNAL MEDICINE

## 2022-04-06 RX ORDER — SPIRONOLACTONE 25 MG/1
25 TABLET ORAL DAILY
Qty: 30 TABLET | Refills: 11 | OUTPATIENT
Start: 2022-04-06 | End: 2022-04-06

## 2022-04-06 RX ORDER — SPIRONOLACTONE 25 MG/1
25 TABLET ORAL DAILY
Qty: 30 TABLET | Refills: 11 | Status: SHIPPED | OUTPATIENT
Start: 2022-04-06 | End: 2022-06-03

## 2022-04-06 RX ORDER — EMPAGLIFLOZIN 10 MG/1
10 TABLET, FILM COATED ORAL DAILY
Qty: 30 TABLET | Refills: 11 | Status: SHIPPED | OUTPATIENT
Start: 2022-04-06 | End: 2022-05-02 | Stop reason: SINTOL

## 2022-04-06 NOTE — PROGRESS NOTES
Gianni Escalona MD  ECHO, [facility] in [facility] - Echo / Stress Lab on 3/18/2022  Elicia Benitez MD in [facility] - Cardiology - [facility] on 4/6/2022    Subjective:   Patient ID:  Yong Mcintyre is a 46 y.o. male who presents for evaluation of diastolic heart failure.    Mr. Yong Mcintyre is a 46-year-old male with past medical history significant for Heart failure with preserved ejection fraction, HTN,  pHTN, on boseten and sildenafil recently discontinued (Followed by Pulmonology at [facility]), chronic hypoxic respiratory failure (3L NC O2 at home), PFO (unsuccessful closure in 2006), AF (on coumadin), Pickwickian syndrome, obese hypoventilation syndrome, morbid obesity with BMI, , long-term use of anticoagulation who was recently admitted to the hospital for CP and SOB. Pt was found to be volume overloaded and diuresed. Pulm and Cardiology consulted. Cardiac workup unremarkable for ACS. Sildenafil discontinued at that visit. RHC performed and found elevated PA pressures but in the setting of elevated left sided filling pressures. He was discharged with Cardiology follow up.    Pt stating he is doing well at this time. He checks his weight daily, and it has been stable. Denies any edema or decrease in his baseline level of function. Denies pnd, orthopnea, GOMEZ. He has been focusing more on his diet and reports strict medication compliance including lasix 80 bid and metolazone 2.5 three times weekly.         RHC 3/22/22  · RHC performed via the right IJ. Patient tolerated the procedure well. Elevated left but normal right side filling pressures (RA= 8 mm of Hg, PCWP= 25 mm of Hg). Moderate pulmonary HTN (PA= 58/28mm of Hg, PA mean=43 mm of Hg) in the setting of elevated left sided filling pressures. Normal cardiac index and output (CI=2.8 L/min/m2, CO=6.5 L/min).     TTE 3/18/22  · Technically challenging study.  · The left ventricle is normal in size with normal systolic function. The estimated ejection fraction is  55%.  · The right ventricle is not well visualized.  · Normal left ventricular diastolic function.  · Intermediate central venous pressure (8 mmHg).          Review of Systems   Constitutional: Negative for chills, decreased appetite and fever.   HENT: Negative for congestion and sore throat.    Eyes: Negative for blurred vision and discharge.   Cardiovascular: Negative for chest pain, claudication, cyanosis, dyspnea on exertion and leg swelling.   Respiratory: Negative for cough, hemoptysis and shortness of breath.    Endocrine: Negative for cold intolerance and heat intolerance.   Skin: Negative for color change.   Musculoskeletal: Negative for muscle weakness and myalgias.   Gastrointestinal: Negative for bloating and abdominal pain.   Neurological: Negative for dizziness, focal weakness and weakness.   Psychiatric/Behavioral: Negative for altered mental status and depression.       Past Medical History:   Diagnosis Date    Arthritis     CHF (congestive heart failure)     Cor pulmonale 11/5/2012    Diastolic dysfunction     Gallstones     Hypertension     Left ventricular systolic dysfunction 11/5/2012    Morbid obesity 11/5/2012    Obesity     MALLIKA (obstructive sleep apnea)     PFO (patent foramen ovale) 11/5/2012    Pickwickian syndrome 11/5/2012    Pulmonary hypertension     Respiratory failure, acute-on-chronic 3/7/2014       Past Surgical History:   Procedure Laterality Date    RIGHT HEART CATHETERIZATION Right 3/22/2022    Procedure: INSERTION, CATHETER, RIGHT HEART;  Surgeon: Tony Martínez MD;  Location: [facility] CATH LAB;  Service: Cardiology;  Laterality: Right;       Family History   Problem Relation Age of Onset    Arthritis Mother     Asthma Mother     Hypertension Father     Asthma Sister     Arthritis Maternal Grandmother     Asthma Maternal Grandmother     Diabetes Maternal Grandmother     Hypertension Maternal Grandmother     Arthritis Maternal Grandfather     Hypertension  Maternal Grandfather     Hypertension Paternal Grandfather     Breast cancer Paternal Grandmother     Down syndrome Brother     Gout Brother          Current Outpatient Medications:     acetaminophen (TYLENOL ARTHRITIS ORAL), Take 2 tablets by mouth daily as needed (pain)., Disp: , Rfl:     ADVAIR DISKUS 250-50 mcg/dose diskus inhaler, Inhale 1 puff into the lungs 2 (two) times daily. Controller, Disp: 60 each, Rfl: 3    albuterol (VENTOLIN HFA) 90 mcg/actuation inhaler, Inhale 2 puffs into the lungs every 4 (four) hours as needed for Wheezing. Rescue, Disp: 18 g, Rfl: 2    allopurinoL (ZYLOPRIM) 300 MG tablet, Take 1 tablet (300 mg total) by mouth once daily., Disp: 90 tablet, Rfl: 3    bosentan (TRACLEER) 125 MG Tab, TAKE 1 TABLET BY MOUTH TWICE A DAY, Disp: 60 tablet, Rfl: 4    furosemide (LASIX) 80 MG tablet, TAKE 1 TABLET(80 MG) BY MOUTH TWICE DAILY, Disp: 180 tablet, Rfl: 3    metOLazone (ZAROXOLYN) 2.5 MG tablet, TAKE 1 TABLET(2.5 MG) BY MOUTH 3 TIMES A WEEK, Disp: 30 tablet, Rfl: prn    metoprolol tartrate (LOPRESSOR) 25 MG tablet, TAKE 1 TABLET BY MOUTH TWICE DAILY, Disp: 180 tablet, Rfl: 3    multivit,calc,min/FA/K1/lycop (ONE-A-DAY MEN'S COMPLETE ORAL), Take 1 tablet by mouth once daily., Disp: , Rfl:     potassium chloride (MICRO-K) 10 MEQ CpSR, TAKE 1 CAPSULE(10 MEQ) BY MOUTH EVERY DAY, Disp: 30 capsule, Rfl: 11    tiotropium (SPIRIVA) 18 mcg inhalation capsule, Inhale 18 mcg into the lungs once daily., Disp: , Rfl:     triamcinolone acetonide (NASACORT ALLERGY NASL), 2 sprays by Nasal route once daily., Disp: , Rfl:     warfarin (COUMADIN) 10 MG tablet, TAKE 1 TABLET BY MOUTH EVERY DAY EXCEPT TAKE 1 1/2 TABLET EVERY THURSDAY OR AS DIRECTED (Patient taking differently: Take 1/2 tablet (5mg) by mouth on Monday, Wednesday and Friday & take 1 tablet (10mg) on Tuesday, Thursday, Saturday, and Sunday.), Disp: 45 tablet, Rfl: 3    empagliflozin (JARDIANCE) 10 mg tablet, Take 1 tablet (10 mg  "total) by mouth once daily., Disp: 30 tablet, Rfl: 11    FLUARIX QUAD 7317-4845, PF, 60 mcg (15 mcg x 4)/0.5 mL Syrg, , Disp: , Rfl:     spironolactone (ALDACTONE) 25 MG tablet, Take 1 tablet (25 mg total) by mouth once daily., Disp: 30 tablet, Rfl: 11  Objective:   /61 (BP Location: Left arm, Patient Position: Sitting, BP Method: Large (Automatic))   Pulse 96   Ht 5' 5" (1.651 m)   Wt 131.5 kg (289 lb 14.5 oz)   BMI 48.24 kg/m²      Physical Exam  Constitutional:       General: He is not in acute distress.     Appearance: He is well-developed. He is obese.      Comments: On 3L NC   HENT:      Head: Normocephalic and atraumatic.      Right Ear: External ear normal.      Left Ear: External ear normal.   Eyes:      Conjunctiva/sclera: Conjunctivae normal.   Cardiovascular:      Rate and Rhythm: Normal rate and regular rhythm.      Pulses: Intact distal pulses.           Radial pulses are 2+ on the right side and 2+ on the left side.      Heart sounds: Normal heart sounds.   Pulmonary:      Effort: Pulmonary effort is normal. No respiratory distress.      Breath sounds: Normal breath sounds. No wheezing.   Abdominal:      General: Bowel sounds are normal. There is no distension.      Palpations: Abdomen is soft.      Tenderness: There is no abdominal tenderness.   Musculoskeletal:         General: Normal range of motion.      Cervical back: Normal range of motion and neck supple.      Comments: +vericose veins   Skin:     General: Skin is warm and dry.   Neurological:      Mental Status: He is alert and oriented to person, place, and time.   Psychiatric:         Mood and Affect: Mood normal.         Behavior: Behavior normal.         Lab Results   Component Value Date     03/23/2022    K 3.6 03/23/2022    CL 85 (L) 03/23/2022    CO2 40 (H) 03/23/2022    BUN 49 (H) 03/23/2022    CREATININE 1.5 (H) 03/23/2022    ANIONGAP 11 03/23/2022     Lab Results   Component Value Date    HGBA1C 5.2 03/18/2022 " "    Lab Results   Component Value Date    BNP 26 03/18/2022    BNP 11 12/07/2019    BNP <10 10/15/2019       Lab Results   Component Value Date    WBC 6.90 03/23/2022    HGB 15.6 03/23/2022    HCT 54.1 (H) 03/23/2022     03/23/2022    GRAN 4.9 03/23/2022    GRAN 71.1 03/23/2022     Lab Results   Component Value Date    CHOL 162 03/16/2022    HDL 50 03/16/2022    LDLCALC 88.0 03/16/2022    TRIG 120 03/16/2022        Assessment:     1. Chronic diastolic heart failure    2. Acute decompensated heart failure    3. Primary hypertension    4. Paroxysmal atrial fibrillation    5. Pulmonary hypertension    6. Oxygen dependent        Plan:   Pulmonary hypertension  Managed by pulmonology with [facility] with close follow ups  Currently on bosentan  Sildenafil recently discontinued  Remains on 3L NC at baseline  Per staff, will make HTS referral to touch base regarding his HF recent admission for medication optimization.    Hypertension  Hypertension:  -BP today: /61 (BP Location: Left arm, Patient Position: Sitting, BP Method: Large (Automatic))   Pulse 96   Ht 5' 5" (1.651 m)   Wt 131.5 kg (289 lb 14.5 oz)   BMI 48.24 kg/m²   -HTN well controlled, pt advised to continue current management of toprol. Will add aldactone per staff for heart failure with preserved EF.  -Pt advised to be compliant with their treatment regimen daily to avoid BP fluctuation and any complications.   -Pt advised to monitor their blood pressure regularly and bring their recorded results to next office visit.  -Pt educated that it is important to eat right. Following a reasonable-calorie low-salt diet (Such as the DASH diet) has been shown to control blood pressure better with less medications. Recommended to exercise at least 30 mins a day for 5 days a week and also told to avoid smoking all together.        Paroxysmal atrial fibrillation  Pt on chronic coumadin therapy  Continue anticoagulation and BB    Chronic diastolic heart " failure  Currently appears at his baseline level of function and volume status  Encouraged to continue checking weight daily and take additional lasix pill if he notices weight gain, SOB, GOMEZ  Per staff, HTS referral made and prescription written for aldactone and jardiance      Patient seen and examined with attending Cardiologist, Dr. Zaman, who agrees with management and plan.    Elicia Benitez MD  Cardiovascular Disease Fellow PGY V  Pager [phone]

## 2022-04-06 NOTE — PROGRESS NOTES
MEDICAL HISTORY:  Pulmonary hypertension.  Hypoventilation syndrome associated with chronic respiratory failure of hypercapnia and hypoxemia.  He is on 3 liters O2.  Heart failure preserved ejection fraction  Obstructive sleep apnea with the use of CPAP.  Gout.  Patent foramen ovale.  Cholecystectomy.     SOCIAL HISTORY:  Tobacco use and alcohol use - none.     Family history  Per epic     MEDICATIONS:  Allopurinol 300 mg  Advair Diskus 250/50 one puff twice a day  Tracleer 125 mg twice a day.  Lasix 80 mg  twice a day.  Metoprolol 25 mg one twice a day.  Micro-K 10 mEq  Coumadin.  Metolazone 2.5 mg 3 days a week, Monday Wednesday, Friday  Jardiance 10 mg  Spironolactone 25 mg  Spiriva 1 puff daily        46-year-old male  Presents for routine follow-up.    He actually had pre visit labs scheduled on March 16th but later had to be admitted for heart failure which was felt to be due to diastolic heart failure.  It was noted on these labs at the CO2 was 45  He has a history of pulmonary hypertension in hypoventilation syndrome with chronic respiratory failure for which he was always on 3 L O2    A few days prior to his admission he was feeling discomfort in his chest is if you had tremendous amount of mucus or is if he swallowed mucus.  He was more short of breath in which he had to rely on 4 L O2    He was treated with IV diuresis.  A 2D echo was performed which showed normal ejection fraction, pulmonary hypertension.  Right heart catheterization performed which showed normal pressures on the right side of the heart but elevated pressures order left-sided heart    Because of such his primary diagnosis was reconsidered and thought that was mainly that of chronic diastolic heart failure    Medications of magnesium and revised here was discontinued.  Yesterday he followed up with cardiology in medications of Jardiance and spironolactone was added.  It is also noted that Spiriva is a relatively new medication within  the past 6 weeks by his primary pulmonologist    At present he feels much better his weight is down about 20 lb.  He remembers his weight being 306 lb but today is 282 lb  His breathing is improved some degree of shortness of breath.  Reports no chest pain, palpitations, abdominal pain.  Reports regular bowel function and no difficulty urinating, nocturia times one  Reports no heartburn ingestion but no morning will have back arthralgia for which he takes 2 extra-strength Tylenol    He is still on BiPAP and works well form and he goes to the gym few days out the week for physical activity    Examination  Weight 282 lb  Pulse 92  Blood pressure 90/62  Tympanic membranes normal  Nasal mucosa is clear  Oropharynx no abnormal findings  Neck no thyromegaly no masses  Chest clear breath sounds  Heart regular rate rhythm  Abdominal exam is bowel sounds soft nontender no hepatosplenomegaly abdominal masses  2+ carotid pulses no bruits, 2+ pedal pulses  Extremities 1+ edema, varicose veins.  Prominent lumbar lordosis    Labs from March 16 stands also noted normal hemoglobin A1c, TSH, lipid profile with total cholesterol 166    Impression  Heart failure with preserved ejection fraction  Pulmonary hypertension  Hypoventilation syndrome  Obstructive sleep apnea uses CPAP    Plan  Attention appropriate diet and physical activity    Discussed with him that his electrolytes will need to be monitor since he is now on spironolactone and still takes a potassium supplement    He has labs May 2nd along with having a pulmonary hypertension consult

## 2022-04-07 ENCOUNTER — ANTI-COAG VISIT (OUTPATIENT)
Dept: CARDIOLOGY | Facility: CLINIC | Age: 47
End: 2022-04-07
Payer: MEDICARE

## 2022-04-07 ENCOUNTER — OFFICE VISIT (OUTPATIENT)
Dept: INTERNAL MEDICINE | Facility: CLINIC | Age: 47
End: 2022-04-07
Payer: MEDICARE

## 2022-04-07 VITALS
OXYGEN SATURATION: 90 % | BODY MASS INDEX: 47.02 KG/M2 | DIASTOLIC BLOOD PRESSURE: 82 MMHG | WEIGHT: 282.19 LBS | SYSTOLIC BLOOD PRESSURE: 130 MMHG | HEART RATE: 94 BPM | HEIGHT: 65 IN

## 2022-04-07 DIAGNOSIS — I27.20 PULMONARY HYPERTENSION: ICD-10-CM

## 2022-04-07 DIAGNOSIS — G47.33 OSA ON CPAP: ICD-10-CM

## 2022-04-07 DIAGNOSIS — R06.89 HYPOVENTILATION SYNDROME: ICD-10-CM

## 2022-04-07 DIAGNOSIS — I27.9 CHRONIC CARDIOPULMONARY DISEASE: ICD-10-CM

## 2022-04-07 DIAGNOSIS — M10.9 GOUT, UNSPECIFIED CAUSE, UNSPECIFIED CHRONICITY, UNSPECIFIED SITE: ICD-10-CM

## 2022-04-07 DIAGNOSIS — I27.9 CHRONIC PULMONARY HEART DISEASE: Primary | ICD-10-CM

## 2022-04-07 DIAGNOSIS — Z79.01 LONG TERM CURRENT USE OF ANTICOAGULANT THERAPY: ICD-10-CM

## 2022-04-07 DIAGNOSIS — I50.32 CHRONIC HEART FAILURE WITH PRESERVED EJECTION FRACTION: Primary | ICD-10-CM

## 2022-04-07 PROCEDURE — 99999 PR PBB SHADOW E&M-EST. PATIENT-LVL IV: CPT | Mod: PBBFAC,,, | Performed by: INTERNAL MEDICINE

## 2022-04-07 PROCEDURE — 3008F PR BODY MASS INDEX (BMI) DOCUMENTED: ICD-10-PCS | Mod: CPTII,S$GLB,, | Performed by: INTERNAL MEDICINE

## 2022-04-07 PROCEDURE — 1159F PR MEDICATION LIST DOCUMENTED IN MEDICAL RECORD: ICD-10-PCS | Mod: CPTII,S$GLB,, | Performed by: INTERNAL MEDICINE

## 2022-04-07 PROCEDURE — 1159F MED LIST DOCD IN RCRD: CPT | Mod: CPTII,S$GLB,, | Performed by: INTERNAL MEDICINE

## 2022-04-07 PROCEDURE — 99499 RISK ADDL DX/OHS AUDIT: ICD-10-PCS | Mod: HCNC,S$GLB,, | Performed by: INTERNAL MEDICINE

## 2022-04-07 PROCEDURE — 3075F SYST BP GE 130 - 139MM HG: CPT | Mod: CPTII,S$GLB,, | Performed by: INTERNAL MEDICINE

## 2022-04-07 PROCEDURE — 99214 OFFICE O/P EST MOD 30 MIN: CPT | Mod: S$GLB,,, | Performed by: INTERNAL MEDICINE

## 2022-04-07 PROCEDURE — 3079F DIAST BP 80-89 MM HG: CPT | Mod: CPTII,S$GLB,, | Performed by: INTERNAL MEDICINE

## 2022-04-07 PROCEDURE — 3008F BODY MASS INDEX DOCD: CPT | Mod: CPTII,S$GLB,, | Performed by: INTERNAL MEDICINE

## 2022-04-07 PROCEDURE — 1160F PR REVIEW ALL MEDS BY PRESCRIBER/CLIN PHARMACIST DOCUMENTED: ICD-10-PCS | Mod: CPTII,S$GLB,, | Performed by: INTERNAL MEDICINE

## 2022-04-07 PROCEDURE — 99214 PR OFFICE/OUTPT VISIT, EST, LEVL IV, 30-39 MIN: ICD-10-PCS | Mod: S$GLB,,, | Performed by: INTERNAL MEDICINE

## 2022-04-07 PROCEDURE — 99999 PR PBB SHADOW E&M-EST. PATIENT-LVL IV: ICD-10-PCS | Mod: PBBFAC,,, | Performed by: INTERNAL MEDICINE

## 2022-04-07 PROCEDURE — 1160F RVW MEDS BY RX/DR IN RCRD: CPT | Mod: CPTII,S$GLB,, | Performed by: INTERNAL MEDICINE

## 2022-04-07 PROCEDURE — 3044F PR MOST RECENT HEMOGLOBIN A1C LEVEL <7.0%: ICD-10-PCS | Mod: CPTII,S$GLB,, | Performed by: INTERNAL MEDICINE

## 2022-04-07 PROCEDURE — 99499 UNLISTED E&M SERVICE: CPT | Mod: HCNC,S$GLB,, | Performed by: INTERNAL MEDICINE

## 2022-04-07 PROCEDURE — 3075F PR MOST RECENT SYSTOLIC BLOOD PRESS GE 130-139MM HG: ICD-10-PCS | Mod: CPTII,S$GLB,, | Performed by: INTERNAL MEDICINE

## 2022-04-07 PROCEDURE — 1111F DSCHRG MED/CURRENT MED MERGE: CPT | Mod: CPTII,S$GLB,, | Performed by: INTERNAL MEDICINE

## 2022-04-07 PROCEDURE — 3079F PR MOST RECENT DIASTOLIC BLOOD PRESSURE 80-89 MM HG: ICD-10-PCS | Mod: CPTII,S$GLB,, | Performed by: INTERNAL MEDICINE

## 2022-04-07 PROCEDURE — 1111F PR DISCHARGE MEDS RECONCILED W/ CURRENT OUTPATIENT MED LIST: ICD-10-PCS | Mod: CPTII,S$GLB,, | Performed by: INTERNAL MEDICINE

## 2022-04-07 PROCEDURE — 3044F HG A1C LEVEL LT 7.0%: CPT | Mod: CPTII,S$GLB,, | Performed by: INTERNAL MEDICINE

## 2022-04-07 RX ORDER — INFLUENZA VIRUS VACCINE 15; 15; 15; 15 UG/.5ML; UG/.5ML; UG/.5ML; UG/.5ML
SUSPENSION INTRAMUSCULAR
COMMUNITY
Start: 2021-11-16 | End: 2022-05-02

## 2022-04-07 NOTE — PROGRESS NOTES
Patient denies any changes in diet, medications, or health that would effect warfarin therapy.  INR at goal. Medications and chart reviewed. No changes noted to necessitate adjustment of warfarin or follow-up plan. See calendar.

## 2022-04-08 NOTE — ASSESSMENT & PLAN NOTE
Managed by pulmonology with [facility] with close follow ups  Currently on bosentan  Sildenafil recently discontinued  Remains on 3L NC at baseline  Per staff, will make HTS referral to touch base regarding his HF recent admission for medication optimization.

## 2022-04-08 NOTE — ASSESSMENT & PLAN NOTE
"Hypertension:  -BP today: /61 (BP Location: Left arm, Patient Position: Sitting, BP Method: Large (Automatic))   Pulse 96   Ht 5' 5" (1.651 m)   Wt 131.5 kg (289 lb 14.5 oz)   BMI 48.24 kg/m²   -HTN well controlled, pt advised to continue current management of toprol. Will add aldactone per staff for heart failure with preserved EF.  -Pt advised to be compliant with their treatment regimen daily to avoid BP fluctuation and any complications.   -Pt advised to monitor their blood pressure regularly and bring their recorded results to next office visit.  -Pt educated that it is important to eat right. Following a reasonable-calorie low-salt diet (Such as the DASH diet) has been shown to control blood pressure better with less medications. Recommended to exercise at least 30 mins a day for 5 days a week and also told to avoid smoking all together.      "

## 2022-04-08 NOTE — ASSESSMENT & PLAN NOTE
Currently appears at his baseline level of function and volume status  Encouraged to continue checking weight daily and take additional lasix pill if he notices weight gain, SOB, GOMEZ  Per staff, HTS referral made and prescription written for aldactone and jardiance

## 2022-04-11 ENCOUNTER — PATIENT MESSAGE (OUTPATIENT)
Dept: ADMINISTRATIVE | Facility: HOSPITAL | Age: 47
End: 2022-04-11
Payer: MEDICARE

## 2022-04-15 ENCOUNTER — PATIENT MESSAGE (OUTPATIENT)
Dept: CARDIOLOGY | Facility: CLINIC | Age: 47
End: 2022-04-15
Payer: MEDICARE

## 2022-04-18 ENCOUNTER — LAB VISIT (OUTPATIENT)
Dept: LAB | Facility: HOSPITAL | Age: 47
End: 2022-04-18
Attending: INTERNAL MEDICINE
Payer: MEDICARE

## 2022-04-18 ENCOUNTER — ANTI-COAG VISIT (OUTPATIENT)
Dept: CARDIOLOGY | Facility: CLINIC | Age: 47
End: 2022-04-18
Payer: MEDICARE

## 2022-04-18 DIAGNOSIS — I27.9 CHRONIC PULMONARY HEART DISEASE: Primary | ICD-10-CM

## 2022-04-18 DIAGNOSIS — Z79.01 LONG TERM CURRENT USE OF ANTICOAGULANT THERAPY: ICD-10-CM

## 2022-04-18 DIAGNOSIS — I27.9 CHRONIC PULMONARY HEART DISEASE: ICD-10-CM

## 2022-04-18 LAB
INR PPP: 2.5 (ref 0.8–1.2)
PROTHROMBIN TIME: 25 SEC (ref 9–12.5)

## 2022-04-18 PROCEDURE — 93793 ANTICOAG MGMT PT WARFARIN: CPT | Mod: S$GLB,,, | Performed by: PHARMACIST

## 2022-04-18 PROCEDURE — 85610 PROTHROMBIN TIME: CPT | Performed by: INTERNAL MEDICINE

## 2022-04-18 PROCEDURE — 36415 COLL VENOUS BLD VENIPUNCTURE: CPT | Performed by: INTERNAL MEDICINE

## 2022-04-18 PROCEDURE — 93793 PR ANTICOAGULANT MGMT FOR PT TAKING WARFARIN: ICD-10-PCS | Mod: S$GLB,,, | Performed by: PHARMACIST

## 2022-05-02 ENCOUNTER — LAB VISIT (OUTPATIENT)
Dept: LAB | Facility: HOSPITAL | Age: 47
End: 2022-05-02
Attending: INTERNAL MEDICINE
Payer: MEDICARE

## 2022-05-02 ENCOUNTER — HOSPITAL ENCOUNTER (OUTPATIENT)
Dept: PULMONOLOGY | Facility: CLINIC | Age: 47
Discharge: HOME | End: 2022-05-02
Payer: MEDICARE

## 2022-05-02 ENCOUNTER — OFFICE VISIT (OUTPATIENT)
Dept: TRANSPLANT | Facility: CLINIC | Age: 47
End: 2022-05-02
Payer: MEDICARE

## 2022-05-02 VITALS
WEIGHT: 278.25 LBS | DIASTOLIC BLOOD PRESSURE: 57 MMHG | BODY MASS INDEX: 46.36 KG/M2 | HEIGHT: 64 IN | WEIGHT: 276 LBS | BODY MASS INDEX: 47.12 KG/M2 | SYSTOLIC BLOOD PRESSURE: 107 MMHG | HEIGHT: 65 IN | HEART RATE: 92 BPM | OXYGEN SATURATION: 82 %

## 2022-05-02 DIAGNOSIS — Z79.899 POLYPHARMACY: ICD-10-CM

## 2022-05-02 DIAGNOSIS — I51.89 DIASTOLIC DYSFUNCTION: ICD-10-CM

## 2022-05-02 DIAGNOSIS — R06.82 TACHYPNEA: ICD-10-CM

## 2022-05-02 DIAGNOSIS — E66.01 MORBID OBESITY WITH BMI OF 45.0-49.9, ADULT: ICD-10-CM

## 2022-05-02 DIAGNOSIS — I27.20 PULMONARY HYPERTENSION: Primary | ICD-10-CM

## 2022-05-02 DIAGNOSIS — I27.9 CHRONIC PULMONARY HEART DISEASE: ICD-10-CM

## 2022-05-02 DIAGNOSIS — G47.33 OSA (OBSTRUCTIVE SLEEP APNEA): ICD-10-CM

## 2022-05-02 LAB
ALBUMIN SERPL BCP-MCNC: 3.2 G/DL (ref 3.5–5.2)
ALP SERPL-CCNC: 134 U/L (ref 55–135)
ALT SERPL W/O P-5'-P-CCNC: 24 U/L (ref 10–44)
ANION GAP SERPL CALC-SCNC: 12 MMOL/L (ref 8–16)
AST SERPL-CCNC: 25 U/L (ref 10–40)
BASOPHILS # BLD AUTO: 0.03 K/UL (ref 0–0.2)
BASOPHILS NFR BLD: 0.4 % (ref 0–1.9)
BILIRUB SERPL-MCNC: 0.7 MG/DL (ref 0.1–1)
BNP SERPL-MCNC: <10 PG/ML (ref 0–99)
BUN SERPL-MCNC: 60 MG/DL (ref 6–20)
CALCIUM SERPL-MCNC: 9.8 MG/DL (ref 8.7–10.5)
CHLORIDE SERPL-SCNC: 88 MMOL/L (ref 95–110)
CO2 SERPL-SCNC: 36 MMOL/L (ref 23–29)
CREAT SERPL-MCNC: 1.8 MG/DL (ref 0.5–1.4)
DIFFERENTIAL METHOD: ABNORMAL
EOSINOPHIL # BLD AUTO: 0.2 K/UL (ref 0–0.5)
EOSINOPHIL NFR BLD: 2.3 % (ref 0–8)
ERYTHROCYTE [DISTWIDTH] IN BLOOD BY AUTOMATED COUNT: 18.9 % (ref 11.5–14.5)
EST. GFR  (AFRICAN AMERICAN): 51 ML/MIN/1.73 M^2
EST. GFR  (NON AFRICAN AMERICAN): 44.2 ML/MIN/1.73 M^2
GLUCOSE SERPL-MCNC: 101 MG/DL (ref 70–110)
HCT VFR BLD AUTO: 57.8 % (ref 40–54)
HGB BLD-MCNC: 17.4 G/DL (ref 14–18)
IMM GRANULOCYTES # BLD AUTO: 0.02 K/UL (ref 0–0.04)
IMM GRANULOCYTES NFR BLD AUTO: 0.2 % (ref 0–0.5)
LYMPHOCYTES # BLD AUTO: 1.4 K/UL (ref 1–4.8)
LYMPHOCYTES NFR BLD: 17.7 % (ref 18–48)
MAGNESIUM SERPL-MCNC: 1.7 MG/DL (ref 1.6–2.6)
MCH RBC QN AUTO: 26.7 PG (ref 27–31)
MCHC RBC AUTO-ENTMCNC: 30.1 G/DL (ref 32–36)
MCV RBC AUTO: 89 FL (ref 82–98)
MONOCYTES # BLD AUTO: 0.7 K/UL (ref 0.3–1)
MONOCYTES NFR BLD: 8 % (ref 4–15)
NEUTROPHILS # BLD AUTO: 5.8 K/UL (ref 1.8–7.7)
NEUTROPHILS NFR BLD: 71.4 % (ref 38–73)
NRBC BLD-RTO: 0 /100 WBC
PLATELET # BLD AUTO: 247 K/UL (ref 150–450)
PMV BLD AUTO: 11 FL (ref 9.2–12.9)
POTASSIUM SERPL-SCNC: 3.2 MMOL/L (ref 3.5–5.1)
PROT SERPL-MCNC: 9.3 G/DL (ref 6–8.4)
RBC # BLD AUTO: 6.51 M/UL (ref 4.6–6.2)
SODIUM SERPL-SCNC: 136 MMOL/L (ref 136–145)
WBC # BLD AUTO: 8.14 K/UL (ref 3.9–12.7)

## 2022-05-02 PROCEDURE — 85025 COMPLETE CBC W/AUTO DIFF WBC: CPT | Performed by: INTERNAL MEDICINE

## 2022-05-02 PROCEDURE — 94618 PULMONARY STRESS TESTING: CPT | Mod: S$GLB,,, | Performed by: INTERNAL MEDICINE

## 2022-05-02 PROCEDURE — 94618 PULMONARY STRESS TESTING: ICD-10-PCS | Mod: S$GLB,,, | Performed by: INTERNAL MEDICINE

## 2022-05-02 PROCEDURE — 99999 PR PBB SHADOW E&M-EST. PATIENT-LVL V: CPT | Mod: PBBFAC,,,

## 2022-05-02 PROCEDURE — 99214 OFFICE O/P EST MOD 30 MIN: CPT | Mod: S$GLB,,,

## 2022-05-02 PROCEDURE — 99999 PR PBB SHADOW E&M-EST. PATIENT-LVL V: ICD-10-PCS | Mod: PBBFAC,,,

## 2022-05-02 PROCEDURE — 1159F PR MEDICATION LIST DOCUMENTED IN MEDICAL RECORD: ICD-10-PCS | Mod: CPTII,S$GLB,,

## 2022-05-02 PROCEDURE — 3078F PR MOST RECENT DIASTOLIC BLOOD PRESSURE < 80 MM HG: ICD-10-PCS | Mod: CPTII,S$GLB,,

## 2022-05-02 PROCEDURE — 1160F PR REVIEW ALL MEDS BY PRESCRIBER/CLIN PHARMACIST DOCUMENTED: ICD-10-PCS | Mod: CPTII,S$GLB,,

## 2022-05-02 PROCEDURE — 83880 ASSAY OF NATRIURETIC PEPTIDE: CPT | Performed by: INTERNAL MEDICINE

## 2022-05-02 PROCEDURE — 83735 ASSAY OF MAGNESIUM: CPT | Performed by: INTERNAL MEDICINE

## 2022-05-02 PROCEDURE — 1160F RVW MEDS BY RX/DR IN RCRD: CPT | Mod: CPTII,S$GLB,,

## 2022-05-02 PROCEDURE — 99214 PR OFFICE/OUTPT VISIT, EST, LEVL IV, 30-39 MIN: ICD-10-PCS | Mod: S$GLB,,,

## 2022-05-02 PROCEDURE — 3074F SYST BP LT 130 MM HG: CPT | Mod: CPTII,S$GLB,,

## 2022-05-02 PROCEDURE — 80053 COMPREHEN METABOLIC PANEL: CPT | Performed by: INTERNAL MEDICINE

## 2022-05-02 PROCEDURE — 3044F PR MOST RECENT HEMOGLOBIN A1C LEVEL <7.0%: ICD-10-PCS | Mod: CPTII,S$GLB,,

## 2022-05-02 PROCEDURE — 3008F PR BODY MASS INDEX (BMI) DOCUMENTED: ICD-10-PCS | Mod: CPTII,S$GLB,,

## 2022-05-02 PROCEDURE — 36415 COLL VENOUS BLD VENIPUNCTURE: CPT | Performed by: INTERNAL MEDICINE

## 2022-05-02 PROCEDURE — 3008F BODY MASS INDEX DOCD: CPT | Mod: CPTII,S$GLB,,

## 2022-05-02 PROCEDURE — 3078F DIAST BP <80 MM HG: CPT | Mod: CPTII,S$GLB,,

## 2022-05-02 PROCEDURE — 1159F MED LIST DOCD IN RCRD: CPT | Mod: CPTII,S$GLB,,

## 2022-05-02 PROCEDURE — 99499 RISK ADDL DX/OHS AUDIT: ICD-10-PCS | Mod: HCNC,S$GLB,,

## 2022-05-02 PROCEDURE — 3044F HG A1C LEVEL LT 7.0%: CPT | Mod: CPTII,S$GLB,,

## 2022-05-02 PROCEDURE — 3074F PR MOST RECENT SYSTOLIC BLOOD PRESSURE < 130 MM HG: ICD-10-PCS | Mod: CPTII,S$GLB,,

## 2022-05-02 PROCEDURE — 99499 UNLISTED E&M SERVICE: CPT | Mod: HCNC,S$GLB,,

## 2022-05-02 NOTE — PATIENT INSTRUCTIONS
Stop taking Jardiance. Skip metolazone dose on Wednesday. Take dose on Friday.    Call KEN Byrnes RN, at [phone] with any questions or concerns.      Recommend 2 gram sodium restriction and 1500cc fluid restriction.  Encourage physical activity with graded exercise program.  Requested patient to weigh themselves daily, and to notify us if their weight increases by more than 3 lbs in 1 day or 5 lbs in 1 week.

## 2022-05-03 NOTE — PROCEDURES
Yong Mcintyre is a 46 y.o.  male patient, who presents for a 6 minute walk test ordered by MD Mauricio.  The diagnosis is Pulmonary Hypertension, Qualify for Oxygen.  The patient's BMI is 47.4kg/m2. Predicted distance (lower limit of normal) is 401.85 meters.    Test Results:    The test was completed without stopping.  The total time walked was 360 seconds.  During walking, the patient reported:  Dyspnea. The patient used no assistive devices during testing.     05/02/2022---------Distance: 328.27 meters (1077 feet)    Oxygen Qualification:     O2 Sat % Supplemental Oxygen Heart Rate Blood Pressure Adi Scale   Pre-exercise  (Resting) 81 % Room Air  89 bpm  111/60  0      Pre-exercise  (Resting) 96 % 3 L/M 88 bpm 106/55 0   During Exercise 72 % 3 L/M 111 bpm 141/62 4   Post-exercise   92 % 3 L/M  93 bpm 130/58       Recovery Time: 243 seconds    Performing nurse/tech: Janie HAIRSTON    PREVIOUS STUDY:   The patient had a previous study.     07/29/2014---------Distance: 530.35 meters (1740 feet)       O2 Sat % Supplemental Oxygen Heart Rate Blood Pressure Adi Scale   Pre-exercise  (Resting) 94 % 3 L/M 89 bpm 121/60 mmHg 0.5   During Exercise 77 % 3 L/M 117 bpm 143/62 mmHg 3   Post-exercise  (Recovery) 93 % 3 L/M  110 bpm            CLINICAL INTERPRETATION:  Six minute walk distance is 328.27 meters (1077 feet) with somewhat heavy dyspnea.  During exercise, there was significant desaturation while breathing supplemental oxygen.  Both blood pressure and heart rate increased significantly with walking.  This may represent a hypertensive and a tachycardic response to exercise.  The patient did not report non-pulmonary symptoms during exercise.  The patient may benefit from using supplemental oxygen.  Since the previous study in July 2014, exercise capacity is significantly worse.  Based upon age and body mass index, exercise capacity is less than predicted.   Oxygen saturation did improve while breathing  supplemental oxygen.

## 2022-05-09 ENCOUNTER — LAB VISIT (OUTPATIENT)
Dept: LAB | Facility: HOSPITAL | Age: 47
End: 2022-05-09
Attending: INTERNAL MEDICINE
Payer: MEDICARE

## 2022-05-09 DIAGNOSIS — Z79.01 LONG TERM CURRENT USE OF ANTICOAGULANT THERAPY: ICD-10-CM

## 2022-05-09 DIAGNOSIS — I27.9 CHRONIC PULMONARY HEART DISEASE: ICD-10-CM

## 2022-05-09 DIAGNOSIS — Z79.899 POLYPHARMACY: ICD-10-CM

## 2022-05-09 LAB
ANION GAP SERPL CALC-SCNC: 13 MMOL/L (ref 8–16)
BUN SERPL-MCNC: 53 MG/DL (ref 6–20)
CALCIUM SERPL-MCNC: 10.5 MG/DL (ref 8.7–10.5)
CHLORIDE SERPL-SCNC: 91 MMOL/L (ref 95–110)
CO2 SERPL-SCNC: 36 MMOL/L (ref 23–29)
CREAT SERPL-MCNC: 1.8 MG/DL (ref 0.5–1.4)
EST. GFR  (AFRICAN AMERICAN): 51 ML/MIN/1.73 M^2
EST. GFR  (NON AFRICAN AMERICAN): 44.2 ML/MIN/1.73 M^2
GLUCOSE SERPL-MCNC: 101 MG/DL (ref 70–110)
INR PPP: 2.2 (ref 0.8–1.2)
POTASSIUM SERPL-SCNC: 4.3 MMOL/L (ref 3.5–5.1)
PROTHROMBIN TIME: 21.9 SEC (ref 9–12.5)
SODIUM SERPL-SCNC: 140 MMOL/L (ref 136–145)

## 2022-05-09 PROCEDURE — 85610 PROTHROMBIN TIME: CPT | Performed by: INTERNAL MEDICINE

## 2022-05-09 PROCEDURE — 80048 BASIC METABOLIC PNL TOTAL CA: CPT

## 2022-05-09 PROCEDURE — 36415 COLL VENOUS BLD VENIPUNCTURE: CPT | Performed by: INTERNAL MEDICINE

## 2022-05-10 ENCOUNTER — ANTI-COAG VISIT (OUTPATIENT)
Dept: CARDIOLOGY | Facility: CLINIC | Age: 47
End: 2022-05-10
Payer: MEDICARE

## 2022-05-10 DIAGNOSIS — I27.9 CHRONIC PULMONARY HEART DISEASE: Primary | ICD-10-CM

## 2022-05-10 DIAGNOSIS — Z79.01 LONG TERM CURRENT USE OF ANTICOAGULANT THERAPY: ICD-10-CM

## 2022-05-10 PROCEDURE — 93793 ANTICOAG MGMT PT WARFARIN: CPT | Mod: S$GLB,,, | Performed by: PHARMACIST

## 2022-05-10 PROCEDURE — 93793 PR ANTICOAGULANT MGMT FOR PT TAKING WARFARIN: ICD-10-PCS | Mod: S$GLB,,, | Performed by: PHARMACIST

## 2022-05-12 ENCOUNTER — PATIENT MESSAGE (OUTPATIENT)
Dept: CARDIOLOGY | Facility: CLINIC | Age: 47
End: 2022-05-12
Payer: MEDICARE

## 2022-05-12 NOTE — PROGRESS NOTES
Subjective:    Patient ID:  Yong Mcintyre is a 46 y.o. male who presents for evaluation of Congestive Heart Failure.  HPI   Mr. Mcintyre was referred to CHF clinic after recent hospitalization. During his stay, Dr. Martínez made some med change recommendations following a RHC. Mr. Mcintyre has a past medical history significant for Heart failure with preserved ejection fraction, HTN,  pHTN, on boseten and sildenafil recently discontinued (Followed by Pulmonology at [facility]), chronic hypoxic respiratory failure (3L NC O2 at home), PFO (unsuccessful closure in 2006), AF (on coumadin), Pickwickian syndrome, obese hypoventilation syndrome, morbid obesity with BMI, , long-term use of anticoagulation who was recently admitted to the hospital for CP and SOB. Pt was found to be volume overloaded and diuresed. Pulm and Cardiology consulted. Cardiac workup unremarkable for ACS. Sildenafil discontinued at that visit. RHC performed and found elevated PA pressures but in the setting of elevated left sided filling pressures. He was discharged with Cardiology follow up.    Today Mr. Mcintyre' primary concerning is stopping Jardiance which was started by Dr. Zaman. He does not feel comfortable going to see him in clinic. He states that he has felt worse on this medication and has not been urinating a lot. Reviewed lab results with patient. Will stop Jardiance and also hold metolazone for at least one dose. He normally takes Metolazone 3x a week. Encouraged him to follow with cardiology. Will be happy to refer to another cardiologist. He does not need to follow in our PH clinic as he is followed closely by Dr. Mando Child at [facility] and would like to remain with him.  Discussed lifestyle modifications. He states that he is exercising and he knows his body very well. Understands the role of fluid and salt in his disease process.       6MWT:   6MW 5/2/2022   6MWT Status completed without stopping   Patient Reported Dyspnea   Was O2 used? Yes    Delivery Method Cannula;Shoulder Carry;Demand Flow   6MW Distance walked (feet) 1077   Distance walked (meters) 328.27   Did patient stop? No   Oxygen Saturation 96   Supplemental Oxygen 3 L/M   Heart Rate 88   Blood Pressure 106/55   Adi Dyspnea Rating  nothing at all   Oxygen Saturation 72   Supplemental Oxygen 3 L/M   Heart Rate 111   Blood Pressure 141/62   Adi Dyspnea Rating  somewhat heavy   Recovery Time (seconds) 243   Oxygen Saturation 92   Supplemental Oxygen 3 L/M   Heart Rate 93     ECHO: 3/18/2022  · Technically challenging study.  · The left ventricle is normal in size with normal systolic function. The estimated ejection fraction is 55%.  · The right ventricle is not well visualized.  · Normal left ventricular diastolic function.  · Intermediate central venous pressure (8 mmHg).      RHC: 3/22/2022  RA: 8 PA: 58/ 28/ 43 PWP: 25 .   Cardiac output was 6.5  by Atif. Cardiac index is 2.8 L/min/m2.       RHC performed via the right IJ. Patient tolerated the procedure well.  Elevated left but normal right side filling pressures (RA= 8 mm of Hg, PCWP= 25 mm of Hg). Moderate pulmonary HTN  (PA= 58/28mm of Hg, PA mean=43 mm of Hg) in the setting of elevated left sided filling pressures. Normal cardiac index and output  (CI=2.8 L/min/m2, CO=6.5 L/min).      Review of Systems   Constitutional: Negative.   HENT: Negative.    Eyes: Negative.    Cardiovascular: Positive for dyspnea on exertion and leg swelling. Negative for chest pain, irregular heartbeat, near-syncope, palpitations and syncope.   Respiratory: Positive for shortness of breath and sleep disturbances due to breathing (Uses CPAP).    Endocrine: Negative.    Hematologic/Lymphatic: Negative.    Skin: Negative.    Musculoskeletal: Negative.    Gastrointestinal: Negative.    Genitourinary: Negative.    Neurological: Negative.    Psychiatric/Behavioral: Negative.         Objective: BP (!) 107/57 (BP Location: Right arm, Patient Position: Sitting, BP  "Method: Large (Automatic))   Pulse 92   Ht 5' 4.5" (1.638 m)   Wt 126.2 kg (278 lb 3.5 oz)   SpO2 (!) 82%   BMI 47.02 kg/m²       Physical Exam  Constitutional:       Appearance: Normal appearance. He is obese. He is not ill-appearing.   HENT:      Head: Normocephalic.      Nose: Nose normal.   Eyes:      Extraocular Movements: Extraocular movements intact.      Conjunctiva/sclera: Conjunctivae normal.   Cardiovascular:      Rate and Rhythm: Normal rate and regular rhythm.      Pulses: Normal pulses.      Heart sounds: Normal heart sounds.   Pulmonary:      Effort: Pulmonary effort is normal.      Breath sounds: Normal breath sounds.   Abdominal:      Palpations: Abdomen is soft.   Musculoskeletal:      Cervical back: Normal range of motion.      Right lower leg: Edema (1+) present.      Left lower leg: Edema (1+) present.   Skin:     General: Skin is warm and dry.      Capillary Refill: Capillary refill takes less than 2 seconds.   Neurological:      Mental Status: He is oriented to person, place, and time.   Psychiatric:         Mood and Affect: Mood normal.           Lab Results   Component Value Date    BNP <10 05/02/2022     05/09/2022    K 4.3 05/09/2022    MG 1.7 05/02/2022    CL 91 (L) 05/09/2022    CO2 36 (H) 05/09/2022    BUN 53 (H) 05/09/2022    CREATININE 1.8 (H) 05/09/2022     05/09/2022    HGBA1C 5.2 03/18/2022    AST 25 05/02/2022    ALT 24 05/02/2022    ALBUMIN 3.2 (L) 05/02/2022    PROT 9.3 (H) 05/02/2022    BILITOT 0.7 05/02/2022    CHOL 162 03/16/2022    HDL 50 03/16/2022    LDLCALC 88.0 03/16/2022    TRIG 120 03/16/2022       Magnesium   Date Value Ref Range Status   05/02/2022 1.7 1.6 - 2.6 mg/dL Final       Lab Results   Component Value Date    WBC 8.14 05/02/2022    HGB 17.4 05/02/2022    HCT 57.8 (H) 05/02/2022    MCV 89 05/02/2022     05/02/2022       Lab Results   Component Value Date    INR 2.2 (H) 05/09/2022    INR 2.5 (H) 04/18/2022    INR 2.8 (H) 04/06/2022 "       BNP   Date Value Ref Range Status   05/02/2022 <10 0 - 99 pg/mL Final     Comment:     Values of less than 100 pg/ml are consistent with non-CHF populations.   03/18/2022 26 0 - 99 pg/mL Final     Comment:     Values of less than 100 pg/ml are consistent with non-CHF populations.   12/07/2019 11 0 - 99 pg/mL Final     Comment:     Values of less than 100 pg/ml are consistent with non-CHF populations.       No results found for: LDH    ..    Assessment:       1. Pulmonary hypertension    2. Morbid obesity with BMI of 45.0-49.9, adult    3. MALLIKA (obstructive sleep apnea)    4. Diastolic dysfunction    5. Polypharmacy         Plan:       Stop taking Jardiance. Skip metolazone dose on Wednesday. Take dose on Friday.    Call KEN Byrnes RN, at [phone] with any questions or concerns.      Recommend 2 gram sodium restriction and 1500cc fluid restriction.  Encourage physical activity with graded exercise program.  Requested patient to weigh themselves daily, and to notify us if their weight increases by more than 3 lbs in 1 day or 5 lbs in 1 week.

## 2022-05-30 ENCOUNTER — PATIENT MESSAGE (OUTPATIENT)
Dept: ADMINISTRATIVE | Facility: HOSPITAL | Age: 47
End: 2022-05-30
Payer: MEDICARE

## 2022-06-02 NOTE — PROGRESS NOTES
MEDICAL HISTORY:  Pulmonary hypertension.  Hypoventilation syndrome associated with chronic respiratory failure of hypercapnia and hypoxemia.  He is on 3 liters O2.  Heart failure preserved ejection fraction  Obstructive sleep apnea with the use of CPAP.  Gout.  Patent foramen ovale.  Cholecystectomy.     SOCIAL HISTORY:  Tobacco use and alcohol use - none.     Family history  Per epic     MEDICATIONS:  Allopurinol 300 mg  Advair Diskus 250/50 one puff twice a day  Tracleer 125 mg twice a day.  Lasix 80 mg  twice a day.  Metoprolol 25 mg one twice a day.  Micro-K 10 mEq  Coumadin.  Metolazone 2.5 mg 3 days a week, Monday Wednesday, Friday  Spiriva 1 puff daily      46-year-old male  Follow-up visit particular in regard to his renal function.  He was seen by me last on April 7th.  The day prior to that he met with Cardiology in the medications of Januvia and spironolactone was started on April 6. He noted that he was feeling dehydrated., felt the need to urinate more but could not, fell constipated not having as much bowel function in just feeling bad.  A follow-up creatinine was 1.8 which was higher than before which was 1.5 prior to this.  Januvia was discontinued on May 2nd and spironolactone was discontinued on May 12th .  Chemistry test on May 9th was noted be 1.8    At present he feels better.  He is having more regular bowel function.  He feels that his urine flows fine and he does not have urgency.  He also is not reporting chest pain or shortness of breath.  He can go to the gym 3 days a week.  He is on chronic oxygen support 3 later this    Exam  Weight 268 lb  Pulse 94  Blood pressure taken by me was 82/60 both arms  Chest clear breath sounds  Heart regular rate rhythm  Abdominal exam is bowel sounds soft nontender    Impression  Azotemia  Low blood pressure reading  As per medical problem list    Plan  Repeat renal profile, and BNP        Addendum  It was noted that his weight April 6 was 282 lb

## 2022-06-03 ENCOUNTER — PATIENT MESSAGE (OUTPATIENT)
Dept: INTERNAL MEDICINE | Facility: CLINIC | Age: 47
End: 2022-06-03

## 2022-06-03 ENCOUNTER — LAB VISIT (OUTPATIENT)
Dept: LAB | Facility: HOSPITAL | Age: 47
End: 2022-06-03
Attending: INTERNAL MEDICINE
Payer: MEDICARE

## 2022-06-03 ENCOUNTER — OFFICE VISIT (OUTPATIENT)
Dept: INTERNAL MEDICINE | Facility: CLINIC | Age: 47
End: 2022-06-03
Payer: MEDICARE

## 2022-06-03 VITALS
SYSTOLIC BLOOD PRESSURE: 124 MMHG | HEIGHT: 64 IN | BODY MASS INDEX: 45.75 KG/M2 | OXYGEN SATURATION: 97 % | DIASTOLIC BLOOD PRESSURE: 80 MMHG | WEIGHT: 268 LBS | HEART RATE: 90 BPM

## 2022-06-03 DIAGNOSIS — R79.89 AZOTEMIA: ICD-10-CM

## 2022-06-03 DIAGNOSIS — I95.9 HYPOTENSION, UNSPECIFIED HYPOTENSION TYPE: ICD-10-CM

## 2022-06-03 DIAGNOSIS — R79.89 AZOTEMIA: Primary | ICD-10-CM

## 2022-06-03 LAB
ALBUMIN SERPL BCP-MCNC: 3.2 G/DL (ref 3.5–5.2)
ANION GAP SERPL CALC-SCNC: 14 MMOL/L (ref 8–16)
BNP SERPL-MCNC: 31 PG/ML (ref 0–99)
BUN SERPL-MCNC: 51 MG/DL (ref 6–20)
CALCIUM SERPL-MCNC: 9.6 MG/DL (ref 8.7–10.5)
CHLORIDE SERPL-SCNC: 88 MMOL/L (ref 95–110)
CO2 SERPL-SCNC: 37 MMOL/L (ref 23–29)
CREAT SERPL-MCNC: 1.8 MG/DL (ref 0.5–1.4)
EST. GFR  (AFRICAN AMERICAN): 51 ML/MIN/1.73 M^2
EST. GFR  (NON AFRICAN AMERICAN): 44.2 ML/MIN/1.73 M^2
GLUCOSE SERPL-MCNC: 90 MG/DL (ref 70–110)
PHOSPHATE SERPL-MCNC: 2.9 MG/DL (ref 2.7–4.5)
POTASSIUM SERPL-SCNC: 3.3 MMOL/L (ref 3.5–5.1)
SODIUM SERPL-SCNC: 139 MMOL/L (ref 136–145)

## 2022-06-03 PROCEDURE — 80069 RENAL FUNCTION PANEL: CPT | Performed by: INTERNAL MEDICINE

## 2022-06-03 PROCEDURE — 3074F PR MOST RECENT SYSTOLIC BLOOD PRESSURE < 130 MM HG: ICD-10-PCS | Mod: CPTII,S$GLB,, | Performed by: INTERNAL MEDICINE

## 2022-06-03 PROCEDURE — 83880 ASSAY OF NATRIURETIC PEPTIDE: CPT | Performed by: INTERNAL MEDICINE

## 2022-06-03 PROCEDURE — 1160F PR REVIEW ALL MEDS BY PRESCRIBER/CLIN PHARMACIST DOCUMENTED: ICD-10-PCS | Mod: CPTII,S$GLB,, | Performed by: INTERNAL MEDICINE

## 2022-06-03 PROCEDURE — 1159F MED LIST DOCD IN RCRD: CPT | Mod: CPTII,S$GLB,, | Performed by: INTERNAL MEDICINE

## 2022-06-03 PROCEDURE — 3008F PR BODY MASS INDEX (BMI) DOCUMENTED: ICD-10-PCS | Mod: CPTII,S$GLB,, | Performed by: INTERNAL MEDICINE

## 2022-06-03 PROCEDURE — 1159F PR MEDICATION LIST DOCUMENTED IN MEDICAL RECORD: ICD-10-PCS | Mod: CPTII,S$GLB,, | Performed by: INTERNAL MEDICINE

## 2022-06-03 PROCEDURE — 3079F PR MOST RECENT DIASTOLIC BLOOD PRESSURE 80-89 MM HG: ICD-10-PCS | Mod: CPTII,S$GLB,, | Performed by: INTERNAL MEDICINE

## 2022-06-03 PROCEDURE — 3044F PR MOST RECENT HEMOGLOBIN A1C LEVEL <7.0%: ICD-10-PCS | Mod: CPTII,S$GLB,, | Performed by: INTERNAL MEDICINE

## 2022-06-03 PROCEDURE — 99214 PR OFFICE/OUTPT VISIT, EST, LEVL IV, 30-39 MIN: ICD-10-PCS | Mod: S$GLB,,, | Performed by: INTERNAL MEDICINE

## 2022-06-03 PROCEDURE — 99214 OFFICE O/P EST MOD 30 MIN: CPT | Mod: S$GLB,,, | Performed by: INTERNAL MEDICINE

## 2022-06-03 PROCEDURE — 3044F HG A1C LEVEL LT 7.0%: CPT | Mod: CPTII,S$GLB,, | Performed by: INTERNAL MEDICINE

## 2022-06-03 PROCEDURE — 1160F RVW MEDS BY RX/DR IN RCRD: CPT | Mod: CPTII,S$GLB,, | Performed by: INTERNAL MEDICINE

## 2022-06-03 PROCEDURE — 3074F SYST BP LT 130 MM HG: CPT | Mod: CPTII,S$GLB,, | Performed by: INTERNAL MEDICINE

## 2022-06-03 PROCEDURE — 3008F BODY MASS INDEX DOCD: CPT | Mod: CPTII,S$GLB,, | Performed by: INTERNAL MEDICINE

## 2022-06-03 PROCEDURE — 36415 COLL VENOUS BLD VENIPUNCTURE: CPT | Performed by: INTERNAL MEDICINE

## 2022-06-03 PROCEDURE — 99999 PR PBB SHADOW E&M-EST. PATIENT-LVL IV: CPT | Mod: PBBFAC,,, | Performed by: INTERNAL MEDICINE

## 2022-06-03 PROCEDURE — 3079F DIAST BP 80-89 MM HG: CPT | Mod: CPTII,S$GLB,, | Performed by: INTERNAL MEDICINE

## 2022-06-03 PROCEDURE — 99999 PR PBB SHADOW E&M-EST. PATIENT-LVL IV: ICD-10-PCS | Mod: PBBFAC,,, | Performed by: INTERNAL MEDICINE

## 2022-06-07 ENCOUNTER — PATIENT MESSAGE (OUTPATIENT)
Dept: INTERNAL MEDICINE | Facility: CLINIC | Age: 47
End: 2022-06-07
Payer: MEDICARE

## 2022-06-07 ENCOUNTER — DOCUMENTATION ONLY (OUTPATIENT)
Dept: INTERNAL MEDICINE | Facility: CLINIC | Age: 47
End: 2022-06-07
Payer: MEDICARE

## 2022-06-07 ENCOUNTER — LAB VISIT (OUTPATIENT)
Dept: LAB | Facility: HOSPITAL | Age: 47
End: 2022-06-07
Attending: INTERNAL MEDICINE
Payer: MEDICARE

## 2022-06-07 DIAGNOSIS — Z79.01 LONG TERM CURRENT USE OF ANTICOAGULANT THERAPY: ICD-10-CM

## 2022-06-07 DIAGNOSIS — I50.32 CHRONIC HEART FAILURE WITH PRESERVED EJECTION FRACTION: Primary | ICD-10-CM

## 2022-06-07 DIAGNOSIS — I27.9 CHRONIC PULMONARY HEART DISEASE: ICD-10-CM

## 2022-06-07 LAB
INR PPP: 1.6 (ref 0.8–1.2)
PROTHROMBIN TIME: 16 SEC (ref 9–12.5)

## 2022-06-07 PROCEDURE — 36415 COLL VENOUS BLD VENIPUNCTURE: CPT | Performed by: INTERNAL MEDICINE

## 2022-06-07 PROCEDURE — 85610 PROTHROMBIN TIME: CPT | Performed by: INTERNAL MEDICINE

## 2022-06-07 NOTE — PROGRESS NOTES
Internal medicine note    Recent labs reviewed in which creatinine was 1.8 potassium 3.3.  From his his visit with me last week it was noted that his weight was down as well as blood pressure being of systolic 80s.    He feels well and will have a put a hold on metolazone of 2.5 mg a takes on Monday Wednesday and Friday in will have repeat lab test on June 13th

## 2022-06-08 ENCOUNTER — LAB VISIT (OUTPATIENT)
Dept: LAB | Facility: HOSPITAL | Age: 47
End: 2022-06-08
Attending: UROLOGY
Payer: MEDICARE

## 2022-06-08 ENCOUNTER — OFFICE VISIT (OUTPATIENT)
Dept: UROLOGY | Facility: CLINIC | Age: 47
End: 2022-06-08
Payer: MEDICARE

## 2022-06-08 ENCOUNTER — ANTI-COAG VISIT (OUTPATIENT)
Dept: CARDIOLOGY | Facility: CLINIC | Age: 47
End: 2022-06-08
Payer: MEDICARE

## 2022-06-08 DIAGNOSIS — I27.9 CHRONIC PULMONARY HEART DISEASE: Primary | ICD-10-CM

## 2022-06-08 DIAGNOSIS — Z79.01 LONG TERM CURRENT USE OF ANTICOAGULANT THERAPY: ICD-10-CM

## 2022-06-08 DIAGNOSIS — N40.0 BENIGN PROSTATIC HYPERPLASIA, UNSPECIFIED WHETHER LOWER URINARY TRACT SYMPTOMS PRESENT: Primary | ICD-10-CM

## 2022-06-08 DIAGNOSIS — N40.0 BENIGN NON-NODULAR PROSTATIC HYPERPLASIA WITHOUT LOWER URINARY TRACT SYMPTOMS: ICD-10-CM

## 2022-06-08 DIAGNOSIS — N40.0 BENIGN PROSTATIC HYPERPLASIA, UNSPECIFIED WHETHER LOWER URINARY TRACT SYMPTOMS PRESENT: ICD-10-CM

## 2022-06-08 LAB — COMPLEXED PSA SERPL-MCNC: 0.69 NG/ML (ref 0–4)

## 2022-06-08 PROCEDURE — 1160F PR REVIEW ALL MEDS BY PRESCRIBER/CLIN PHARMACIST DOCUMENTED: ICD-10-PCS | Mod: CPTII,S$GLB,, | Performed by: UROLOGY

## 2022-06-08 PROCEDURE — 84153 ASSAY OF PSA TOTAL: CPT | Performed by: UROLOGY

## 2022-06-08 PROCEDURE — 99999 PR PBB SHADOW E&M-EST. PATIENT-LVL IV: ICD-10-PCS | Mod: PBBFAC,,, | Performed by: UROLOGY

## 2022-06-08 PROCEDURE — 3044F HG A1C LEVEL LT 7.0%: CPT | Mod: CPTII,S$GLB,, | Performed by: UROLOGY

## 2022-06-08 PROCEDURE — 1160F RVW MEDS BY RX/DR IN RCRD: CPT | Mod: CPTII,S$GLB,, | Performed by: UROLOGY

## 2022-06-08 PROCEDURE — 1159F MED LIST DOCD IN RCRD: CPT | Mod: CPTII,S$GLB,, | Performed by: UROLOGY

## 2022-06-08 PROCEDURE — 99999 PR PBB SHADOW E&M-EST. PATIENT-LVL IV: CPT | Mod: PBBFAC,,, | Performed by: UROLOGY

## 2022-06-08 PROCEDURE — 1159F PR MEDICATION LIST DOCUMENTED IN MEDICAL RECORD: ICD-10-PCS | Mod: CPTII,S$GLB,, | Performed by: UROLOGY

## 2022-06-08 PROCEDURE — 93793 ANTICOAG MGMT PT WARFARIN: CPT | Mod: S$GLB,,, | Performed by: PHARMACIST

## 2022-06-08 PROCEDURE — 99203 PR OFFICE/OUTPT VISIT, NEW, LEVL III, 30-44 MIN: ICD-10-PCS | Mod: S$GLB,,, | Performed by: UROLOGY

## 2022-06-08 PROCEDURE — 36415 COLL VENOUS BLD VENIPUNCTURE: CPT | Performed by: UROLOGY

## 2022-06-08 PROCEDURE — 93793 PR ANTICOAGULANT MGMT FOR PT TAKING WARFARIN: ICD-10-PCS | Mod: S$GLB,,, | Performed by: PHARMACIST

## 2022-06-08 PROCEDURE — 3044F PR MOST RECENT HEMOGLOBIN A1C LEVEL <7.0%: ICD-10-PCS | Mod: CPTII,S$GLB,, | Performed by: UROLOGY

## 2022-06-08 PROCEDURE — 99203 OFFICE O/P NEW LOW 30 MIN: CPT | Mod: S$GLB,,, | Performed by: UROLOGY

## 2022-06-08 NOTE — PROGRESS NOTES
Subjective:       Patient ID: Yong Mcintyre is a 46 y.o. male.    Chief Complaint:  Patient is here for prostate check.  PSA is 0.69.  He was actually seen yesterday but dictation would not work he is voiding well and has significant medical problems    Past Medical History:   Diagnosis Date    Arthritis     CHF (congestive heart failure)     Cor pulmonale 11/5/2012    Diastolic dysfunction     Gallstones     Hypertension     Left ventricular systolic dysfunction 11/5/2012    Morbid obesity 11/5/2012    Obesity     MALLIKA (obstructive sleep apnea)     PFO (patent foramen ovale) 11/5/2012    Pickwickian syndrome 11/5/2012    Pulmonary hypertension     Respiratory failure, acute-on-chronic 3/7/2014       Past Surgical History:   Procedure Laterality Date    RIGHT HEART CATHETERIZATION Right 3/22/2022    Procedure: INSERTION, CATHETER, RIGHT HEART;  Surgeon: Tony Martínez MD;  Location[facility] CATH LAB;  Service: Cardiology;  Laterality: Right;       Family History   Problem Relation Age of Onset    Arthritis Mother     Asthma Mother     Hypertension Father     Asthma Sister     Arthritis Maternal Grandmother     Asthma Maternal Grandmother     Diabetes Maternal Grandmother     Hypertension Maternal Grandmother     Arthritis Maternal Grandfather     Hypertension Maternal Grandfather     Hypertension Paternal Grandfather     Breast cancer Paternal Grandmother     Down syndrome Brother     Gout Brother        Social History     Socioeconomic History    Marital status: Single   Tobacco Use    Smoking status: Never Smoker    Smokeless tobacco: Never Used   Substance and Sexual Activity    Alcohol use: Yes     Comment: holidays  rare    Drug use: No    Sexual activity: Not Currently     Partners: Female       Allergies:  Lipitor [atorvastatin]    Medications:    Current Outpatient Medications:     acetaminophen (TYLENOL ARTHRITIS ORAL), Take 2 tablets by mouth daily as needed (pain).,  Disp: , Rfl:     ADVAIR DISKUS 250-50 mcg/dose diskus inhaler, INHALE 1 PUFF BY MOUTH( INTO THE LUNGS) TWICE DAILY, Disp: 60 each, Rfl: 3    albuterol (VENTOLIN HFA) 90 mcg/actuation inhaler, Inhale 2 puffs into the lungs every 4 (four) hours as needed for Wheezing. Rescue, Disp: 18 g, Rfl: 2    allopurinoL (ZYLOPRIM) 300 MG tablet, TAKE 1 TABLET(300 MG) BY MOUTH EVERY DAY, Disp: 90 tablet, Rfl: 3    bosentan (TRACLEER) 125 MG Tab, TAKE 1 TABLET BY MOUTH TWICE A DAY, Disp: 60 tablet, Rfl: 4    furosemide (LASIX) 80 MG tablet, TAKE 1 TABLET(80 MG) BY MOUTH TWICE DAILY, Disp: 180 tablet, Rfl: 3    metOLazone (ZAROXOLYN) 2.5 MG tablet, TAKE 1 TABLET(2.5 MG) BY MOUTH 3 TIMES A WEEK, Disp: 30 tablet, Rfl: prn    metoprolol tartrate (LOPRESSOR) 25 MG tablet, TAKE 1 TABLET BY MOUTH TWICE DAILY, Disp: 180 tablet, Rfl: 3    multivit,calc,min/FA/K1/lycop (ONE-A-DAY MEN'S COMPLETE ORAL), Take 1 tablet by mouth once daily., Disp: , Rfl:     potassium chloride (MICRO-K) 10 MEQ CpSR, TAKE 1 CAPSULE(10 MEQ) BY MOUTH EVERY DAY, Disp: 30 capsule, Rfl: 11    tiotropium (SPIRIVA) 18 mcg inhalation capsule, Inhale 18 mcg into the lungs once daily., Disp: , Rfl:     triamcinolone acetonide (NASACORT ALLERGY NASL), 2 sprays by Nasal route once daily., Disp: , Rfl:     warfarin (COUMADIN) 10 MG tablet, TAKE 1 TABLET BY MOUTH EVERY DAY EXCEPT TAKE 1 1/2 TABLET EVERY THURSDAY OR AS DIRECTED (Patient taking differently: Take 1/2 tablet (5mg) by mouth on Monday, Wednesday and Friday & take 1 tablet (10mg) on Tuesday, Thursday, Saturday, and Sunday.), Disp: 45 tablet, Rfl: 3    Review of Systems   Constitutional: Negative for activity change, appetite change, chills, diaphoresis, fatigue, fever and unexpected weight change.   HENT: Negative for congestion, dental problem, hearing loss, mouth sores, postnasal drip, rhinorrhea, sinus pressure and trouble swallowing.    Eyes: Negative for pain, discharge and itching.   Respiratory:  Negative for apnea, cough, choking, chest tightness, shortness of breath and wheezing.    Cardiovascular: Negative for chest pain, palpitations and leg swelling.   Gastrointestinal: Negative for abdominal distention, abdominal pain, anal bleeding, blood in stool, constipation, diarrhea, nausea, rectal pain and vomiting.   Endocrine: Negative for polydipsia and polyuria.   Genitourinary: Negative for decreased urine volume, difficulty urinating, dysuria, enuresis, flank pain, frequency, genital sores, hematuria, penile discharge, penile pain, penile swelling, scrotal swelling, testicular pain and urgency.   Musculoskeletal: Negative for arthralgias, back pain and myalgias.   Skin: Negative for color change, rash and wound.   Neurological: Negative for dizziness, syncope, speech difficulty, light-headedness and headaches.   Hematological: Negative for adenopathy. Does not bruise/bleed easily.   Psychiatric/Behavioral: Negative for behavioral problems, confusion, hallucinations and sleep disturbance.       Objective:      Physical Exam  Constitutional:       Appearance: He is well-developed.   HENT:      Head: Normocephalic.   Cardiovascular:      Rate and Rhythm: Normal rate.   Pulmonary:      Effort: Pulmonary effort is normal.   Abdominal:      Palpations: Abdomen is soft.   Genitourinary:     Prostate: Normal.      Comments: 30 g benign  Skin:     General: Skin is warm.   Neurological:      Mental Status: He is alert.         Assessment:       1. Benign prostatic hyperplasia, unspecified whether lower urinary tract symptoms present    2. Benign non-nodular prostatic hyperplasia without lower urinary tract symptoms        Plan:       Diagnoses and all orders for this visit:    Benign prostatic hyperplasia, unspecified whether lower urinary tract symptoms present  -     Prostate Specific Antigen, Diagnostic; Future  -     Prostate Specific Antigen, Diagnostic; Future  -     Prostate Specific Antigen, Diagnostic;  Future    Benign non-nodular prostatic hyperplasia without lower urinary tract symptoms    Phone review PSA level

## 2022-06-10 NOTE — TELEPHONE ENCOUNTER
Fax request received from pharmacy for rx refill of potassium CL 10MEQ ER Caps. Order pended to provider.

## 2022-06-10 NOTE — TELEPHONE ENCOUNTER
No new care gaps identified.  [facility] Embedded Care Gaps. Reference number: [id number]. 6/10/2022   11:13:45 AM DOVT

## 2022-06-11 RX ORDER — POTASSIUM CHLORIDE 750 MG/1
CAPSULE, EXTENDED RELEASE ORAL
Qty: 30 CAPSULE | Refills: 5 | Status: ON HOLD | OUTPATIENT
Start: 2022-06-11 | End: 2023-12-15

## 2022-06-13 ENCOUNTER — DOCUMENTATION ONLY (OUTPATIENT)
Dept: INTERNAL MEDICINE | Facility: CLINIC | Age: 47
End: 2022-06-13
Payer: MEDICARE

## 2022-06-13 ENCOUNTER — LAB VISIT (OUTPATIENT)
Dept: LAB | Facility: HOSPITAL | Age: 47
End: 2022-06-13
Attending: INTERNAL MEDICINE
Payer: MEDICARE

## 2022-06-13 DIAGNOSIS — I50.32 CHRONIC HEART FAILURE WITH PRESERVED EJECTION FRACTION: ICD-10-CM

## 2022-06-13 LAB
ANION GAP SERPL CALC-SCNC: 13 MMOL/L (ref 8–16)
BNP SERPL-MCNC: 28 PG/ML (ref 0–99)
BUN SERPL-MCNC: 36 MG/DL (ref 6–20)
CALCIUM SERPL-MCNC: 9 MG/DL (ref 8.7–10.5)
CHLORIDE SERPL-SCNC: 96 MMOL/L (ref 95–110)
CO2 SERPL-SCNC: 31 MMOL/L (ref 23–29)
CREAT SERPL-MCNC: 1.3 MG/DL (ref 0.5–1.4)
EST. GFR  (AFRICAN AMERICAN): >60 ML/MIN/1.73 M^2
EST. GFR  (NON AFRICAN AMERICAN): >60 ML/MIN/1.73 M^2
GLUCOSE SERPL-MCNC: 97 MG/DL (ref 70–110)
POTASSIUM SERPL-SCNC: 3.6 MMOL/L (ref 3.5–5.1)
SODIUM SERPL-SCNC: 140 MMOL/L (ref 136–145)

## 2022-06-13 PROCEDURE — 36415 COLL VENOUS BLD VENIPUNCTURE: CPT | Performed by: INTERNAL MEDICINE

## 2022-06-13 PROCEDURE — 80048 BASIC METABOLIC PNL TOTAL CA: CPT | Performed by: INTERNAL MEDICINE

## 2022-06-13 PROCEDURE — 83880 ASSAY OF NATRIURETIC PEPTIDE: CPT | Performed by: INTERNAL MEDICINE

## 2022-06-13 NOTE — PROGRESS NOTES
Internal medicine note    Follow-up labs, creatinine is at 1.3 and potassium 3.6 this is improved from the previous readings.  Last week I apex temp to put a hold on metolazone a takes 3 times a a week ,just stay with Lasix 80 mg b.i.d.    He has an upcoming appointment with his cardiologist in a couple of days and was instructed to address this with the cardiologist, to manage the diuretics he has lost weight so possibly he can just stay with Lasix 80 mg b.i.d.   BMP was normal

## 2022-06-23 ENCOUNTER — LAB VISIT (OUTPATIENT)
Dept: LAB | Facility: HOSPITAL | Age: 47
End: 2022-06-23
Payer: MEDICARE

## 2022-06-23 DIAGNOSIS — I27.9 CHRONIC PULMONARY HEART DISEASE: ICD-10-CM

## 2022-06-23 DIAGNOSIS — Z79.01 LONG TERM CURRENT USE OF ANTICOAGULANT THERAPY: ICD-10-CM

## 2022-06-23 LAB
INR PPP: 1.7 (ref 0.8–1.2)
PROTHROMBIN TIME: 17.4 SEC (ref 9–12.5)

## 2022-06-23 PROCEDURE — 36415 COLL VENOUS BLD VENIPUNCTURE: CPT | Performed by: INTERNAL MEDICINE

## 2022-06-23 PROCEDURE — 85610 PROTHROMBIN TIME: CPT | Performed by: INTERNAL MEDICINE

## 2022-06-24 ENCOUNTER — ANTI-COAG VISIT (OUTPATIENT)
Dept: CARDIOLOGY | Facility: CLINIC | Age: 47
End: 2022-06-24
Payer: MEDICARE

## 2022-06-24 DIAGNOSIS — I27.9 CHRONIC PULMONARY HEART DISEASE: Primary | ICD-10-CM

## 2022-06-24 DIAGNOSIS — Z79.01 LONG TERM CURRENT USE OF ANTICOAGULANT THERAPY: ICD-10-CM

## 2022-06-24 PROCEDURE — 93793 PR ANTICOAGULANT MGMT FOR PT TAKING WARFARIN: ICD-10-PCS | Mod: S$GLB,,, | Performed by: PHARMACIST

## 2022-06-24 PROCEDURE — 93793 ANTICOAG MGMT PT WARFARIN: CPT | Mod: S$GLB,,, | Performed by: PHARMACIST

## 2022-07-19 ENCOUNTER — LAB VISIT (OUTPATIENT)
Dept: LAB | Facility: HOSPITAL | Age: 47
End: 2022-07-19
Attending: INTERNAL MEDICINE
Payer: MEDICARE

## 2022-07-19 DIAGNOSIS — I27.9 CHRONIC PULMONARY HEART DISEASE: ICD-10-CM

## 2022-07-19 DIAGNOSIS — Z79.01 LONG TERM CURRENT USE OF ANTICOAGULANT THERAPY: ICD-10-CM

## 2022-07-19 LAB
INR PPP: 2.5 (ref 0.8–1.2)
PROTHROMBIN TIME: 25.1 SEC (ref 9–12.5)

## 2022-07-19 PROCEDURE — 36415 COLL VENOUS BLD VENIPUNCTURE: CPT | Performed by: INTERNAL MEDICINE

## 2022-07-19 PROCEDURE — 85610 PROTHROMBIN TIME: CPT | Performed by: INTERNAL MEDICINE

## 2022-07-20 ENCOUNTER — ANTI-COAG VISIT (OUTPATIENT)
Dept: CARDIOLOGY | Facility: CLINIC | Age: 47
End: 2022-07-20
Payer: MEDICARE

## 2022-07-20 DIAGNOSIS — I27.9 CHRONIC PULMONARY HEART DISEASE: Primary | ICD-10-CM

## 2022-07-20 DIAGNOSIS — Z79.01 LONG TERM CURRENT USE OF ANTICOAGULANT THERAPY: ICD-10-CM

## 2022-07-20 PROCEDURE — 93793 ANTICOAG MGMT PT WARFARIN: CPT | Mod: S$GLB,,, | Performed by: PHARMACIST

## 2022-07-20 PROCEDURE — 93793 PR ANTICOAGULANT MGMT FOR PT TAKING WARFARIN: ICD-10-PCS | Mod: S$GLB,,, | Performed by: PHARMACIST

## 2022-08-16 ENCOUNTER — LAB VISIT (OUTPATIENT)
Dept: LAB | Facility: HOSPITAL | Age: 47
End: 2022-08-16
Attending: INTERNAL MEDICINE
Payer: MEDICARE

## 2022-08-16 DIAGNOSIS — Z79.01 LONG TERM CURRENT USE OF ANTICOAGULANT THERAPY: ICD-10-CM

## 2022-08-16 DIAGNOSIS — I27.9 CHRONIC PULMONARY HEART DISEASE: ICD-10-CM

## 2022-08-16 LAB
INR PPP: 2.1 (ref 0.8–1.2)
PROTHROMBIN TIME: 20.6 SEC (ref 9–12.5)

## 2022-08-16 PROCEDURE — 85610 PROTHROMBIN TIME: CPT | Performed by: INTERNAL MEDICINE

## 2022-08-16 PROCEDURE — 36415 COLL VENOUS BLD VENIPUNCTURE: CPT | Performed by: INTERNAL MEDICINE

## 2022-08-17 ENCOUNTER — ANTI-COAG VISIT (OUTPATIENT)
Dept: CARDIOLOGY | Facility: CLINIC | Age: 47
End: 2022-08-17
Payer: MEDICARE

## 2022-08-17 DIAGNOSIS — I27.9 CHRONIC PULMONARY HEART DISEASE: Primary | ICD-10-CM

## 2022-08-17 DIAGNOSIS — Z79.01 LONG TERM CURRENT USE OF ANTICOAGULANT THERAPY: ICD-10-CM

## 2022-08-17 PROCEDURE — 93793 PR ANTICOAGULANT MGMT FOR PT TAKING WARFARIN: ICD-10-PCS | Mod: S$GLB,,, | Performed by: PHARMACIST

## 2022-08-17 PROCEDURE — 93793 ANTICOAG MGMT PT WARFARIN: CPT | Mod: S$GLB,,, | Performed by: PHARMACIST

## 2022-09-13 ENCOUNTER — LAB VISIT (OUTPATIENT)
Dept: LAB | Facility: HOSPITAL | Age: 47
End: 2022-09-13
Attending: INTERNAL MEDICINE
Payer: MEDICARE

## 2022-09-13 DIAGNOSIS — Z79.01 LONG TERM CURRENT USE OF ANTICOAGULANT THERAPY: ICD-10-CM

## 2022-09-13 DIAGNOSIS — I27.9 CHRONIC PULMONARY HEART DISEASE: ICD-10-CM

## 2022-09-13 LAB
INR PPP: 2.3 (ref 0.8–1.2)
PROTHROMBIN TIME: 22.9 SEC (ref 9–12.5)

## 2022-09-13 PROCEDURE — 36415 COLL VENOUS BLD VENIPUNCTURE: CPT | Performed by: INTERNAL MEDICINE

## 2022-09-13 PROCEDURE — 85610 PROTHROMBIN TIME: CPT | Performed by: INTERNAL MEDICINE

## 2022-09-14 ENCOUNTER — ANTI-COAG VISIT (OUTPATIENT)
Dept: CARDIOLOGY | Facility: CLINIC | Age: 47
End: 2022-09-14
Payer: MEDICARE

## 2022-09-14 DIAGNOSIS — Z79.01 LONG TERM CURRENT USE OF ANTICOAGULANT THERAPY: ICD-10-CM

## 2022-09-14 DIAGNOSIS — I27.9 CHRONIC PULMONARY HEART DISEASE: Primary | ICD-10-CM

## 2022-09-14 PROCEDURE — 93793 ANTICOAG MGMT PT WARFARIN: CPT | Mod: S$GLB,,, | Performed by: PHARMACIST

## 2022-09-14 PROCEDURE — 93793 PR ANTICOAGULANT MGMT FOR PT TAKING WARFARIN: ICD-10-PCS | Mod: S$GLB,,, | Performed by: PHARMACIST

## 2022-09-30 ENCOUNTER — IMMUNIZATION (OUTPATIENT)
Dept: INTERNAL MEDICINE | Facility: CLINIC | Age: 47
End: 2022-09-30
Payer: MEDICARE

## 2022-09-30 DIAGNOSIS — Z23 NEED FOR VACCINATION: Primary | ICD-10-CM

## 2022-09-30 PROCEDURE — 90686 IIV4 VACC NO PRSV 0.5 ML IM: CPT | Mod: S$GLB,,, | Performed by: INTERNAL MEDICINE

## 2022-09-30 PROCEDURE — 91312 COVID-19, MRNA, LNP-S, BIVALENT BOOSTER, PF, 30 MCG/0.3 ML DOSE: CPT | Mod: S$GLB,,, | Performed by: INTERNAL MEDICINE

## 2022-09-30 PROCEDURE — G0008 FLU VACCINE (QUAD) GREATER THAN OR EQUAL TO 3YO PRESERVATIVE FREE IM: ICD-10-PCS | Mod: S$GLB,,, | Performed by: INTERNAL MEDICINE

## 2022-09-30 PROCEDURE — 90686 FLU VACCINE (QUAD) GREATER THAN OR EQUAL TO 3YO PRESERVATIVE FREE IM: ICD-10-PCS | Mod: S$GLB,,, | Performed by: INTERNAL MEDICINE

## 2022-09-30 PROCEDURE — 91312 COVID-19, MRNA, LNP-S, BIVALENT BOOSTER, PF, 30 MCG/0.3 ML DOSE: ICD-10-PCS | Mod: S$GLB,,, | Performed by: INTERNAL MEDICINE

## 2022-09-30 PROCEDURE — G0008 ADMIN INFLUENZA VIRUS VAC: HCPCS | Mod: S$GLB,,, | Performed by: INTERNAL MEDICINE

## 2022-09-30 PROCEDURE — 0124A COVID-19, MRNA, LNP-S, BIVALENT BOOSTER, PF, 30 MCG/0.3 ML DOSE: CPT | Mod: CV19,PBBFAC | Performed by: INTERNAL MEDICINE

## 2022-10-12 ENCOUNTER — LAB VISIT (OUTPATIENT)
Dept: LAB | Facility: HOSPITAL | Age: 47
End: 2022-10-12
Attending: INTERNAL MEDICINE
Payer: MEDICARE

## 2022-10-12 DIAGNOSIS — Z79.01 LONG TERM CURRENT USE OF ANTICOAGULANT THERAPY: ICD-10-CM

## 2022-10-12 DIAGNOSIS — I27.9 CHRONIC PULMONARY HEART DISEASE: ICD-10-CM

## 2022-10-12 LAB
INR PPP: 1.7 (ref 0.8–1.2)
PROTHROMBIN TIME: 16.9 SEC (ref 9–12.5)

## 2022-10-12 PROCEDURE — 85610 PROTHROMBIN TIME: CPT | Performed by: INTERNAL MEDICINE

## 2022-10-12 PROCEDURE — 36415 COLL VENOUS BLD VENIPUNCTURE: CPT | Performed by: INTERNAL MEDICINE

## 2022-10-13 ENCOUNTER — ANTI-COAG VISIT (OUTPATIENT)
Dept: CARDIOLOGY | Facility: CLINIC | Age: 47
End: 2022-10-13
Payer: MEDICARE

## 2022-10-13 DIAGNOSIS — Z79.01 LONG TERM CURRENT USE OF ANTICOAGULANT THERAPY: ICD-10-CM

## 2022-10-13 DIAGNOSIS — I27.9 CHRONIC PULMONARY HEART DISEASE: Primary | ICD-10-CM

## 2022-10-13 PROCEDURE — 93793 PR ANTICOAGULANT MGMT FOR PT TAKING WARFARIN: ICD-10-PCS | Mod: S$GLB,,, | Performed by: PHARMACIST

## 2022-10-13 PROCEDURE — 93793 ANTICOAG MGMT PT WARFARIN: CPT | Mod: S$GLB,,, | Performed by: PHARMACIST

## 2022-10-13 NOTE — PROGRESS NOTES
INR not at goal. Medications, chart, and patient findings reviewed. See calendar for adjustments to dose and follow up plan.  Patient denies any changes in diet, medications, or health that would effect warfarin therapy.  Patient willl be out of [facility] 10/18-10/28;

## 2022-10-31 ENCOUNTER — LAB VISIT (OUTPATIENT)
Dept: LAB | Facility: HOSPITAL | Age: 47
End: 2022-10-31
Attending: INTERNAL MEDICINE
Payer: MEDICARE

## 2022-10-31 DIAGNOSIS — Z79.01 LONG TERM CURRENT USE OF ANTICOAGULANT THERAPY: ICD-10-CM

## 2022-10-31 DIAGNOSIS — I27.9 CHRONIC PULMONARY HEART DISEASE: ICD-10-CM

## 2022-10-31 LAB
INR PPP: 2.9 (ref 0.8–1.2)
PROTHROMBIN TIME: 28.6 SEC (ref 9–12.5)

## 2022-10-31 PROCEDURE — 85610 PROTHROMBIN TIME: CPT | Performed by: INTERNAL MEDICINE

## 2022-10-31 PROCEDURE — 36415 COLL VENOUS BLD VENIPUNCTURE: CPT | Performed by: INTERNAL MEDICINE

## 2022-11-01 ENCOUNTER — ANTI-COAG VISIT (OUTPATIENT)
Dept: CARDIOLOGY | Facility: CLINIC | Age: 47
End: 2022-11-01
Payer: MEDICARE

## 2022-11-01 DIAGNOSIS — I27.9 CHRONIC PULMONARY HEART DISEASE: Primary | ICD-10-CM

## 2022-11-01 DIAGNOSIS — Z79.01 LONG TERM CURRENT USE OF ANTICOAGULANT THERAPY: ICD-10-CM

## 2022-11-01 PROCEDURE — 93793 ANTICOAG MGMT PT WARFARIN: CPT | Mod: S$GLB,,, | Performed by: PHARMACIST

## 2022-11-01 PROCEDURE — 93793 PR ANTICOAGULANT MGMT FOR PT TAKING WARFARIN: ICD-10-PCS | Mod: S$GLB,,, | Performed by: PHARMACIST

## 2022-12-07 ENCOUNTER — PES CALL (OUTPATIENT)
Dept: ADMINISTRATIVE | Facility: CLINIC | Age: 47
End: 2022-12-07
Payer: MEDICARE

## 2022-12-07 ENCOUNTER — PATIENT OUTREACH (OUTPATIENT)
Dept: ADMINISTRATIVE | Facility: HOSPITAL | Age: 47
End: 2022-12-07
Payer: MEDICARE

## 2022-12-09 ENCOUNTER — LAB VISIT (OUTPATIENT)
Dept: LAB | Facility: HOSPITAL | Age: 47
End: 2022-12-09
Attending: INTERNAL MEDICINE
Payer: MEDICARE

## 2022-12-09 DIAGNOSIS — Z79.01 LONG TERM CURRENT USE OF ANTICOAGULANT THERAPY: ICD-10-CM

## 2022-12-09 DIAGNOSIS — I27.9 CHRONIC PULMONARY HEART DISEASE: ICD-10-CM

## 2022-12-09 LAB
INR PPP: 3.2 (ref 0.8–1.2)
PROTHROMBIN TIME: 31.4 SEC (ref 9–12.5)

## 2022-12-09 PROCEDURE — 36415 COLL VENOUS BLD VENIPUNCTURE: CPT | Performed by: INTERNAL MEDICINE

## 2022-12-09 PROCEDURE — 85610 PROTHROMBIN TIME: CPT | Performed by: INTERNAL MEDICINE

## 2022-12-12 ENCOUNTER — ANTI-COAG VISIT (OUTPATIENT)
Dept: CARDIOLOGY | Facility: CLINIC | Age: 47
End: 2022-12-12
Payer: MEDICARE

## 2022-12-12 DIAGNOSIS — I27.9 CHRONIC PULMONARY HEART DISEASE: Primary | ICD-10-CM

## 2022-12-12 DIAGNOSIS — Z79.01 LONG TERM CURRENT USE OF ANTICOAGULANT THERAPY: ICD-10-CM

## 2022-12-12 PROCEDURE — 93793 ANTICOAG MGMT PT WARFARIN: CPT | Mod: S$GLB,,, | Performed by: PHARMACIST

## 2022-12-12 PROCEDURE — 93793 PR ANTICOAGULANT MGMT FOR PT TAKING WARFARIN: ICD-10-PCS | Mod: S$GLB,,, | Performed by: PHARMACIST

## 2022-12-12 NOTE — PROGRESS NOTES
Patient reports he will be out of town until 1/6/23 and cannot return for an INR any sooner. Patient was re-educated on situations that would require placing a call to the coumadin clinic, including bleeding or unusual bruising issues, changes in health, diet or medications, upcoming procedures that require warfarin interruption, and missed coumadin dose(s). Patient expressed understanding that avoidance of consistency with these parameters could cause fluctuations in INR, leading to more frequent visits and increase risk of adverse events.

## 2023-01-30 ENCOUNTER — TELEPHONE (OUTPATIENT)
Dept: INTERNAL MEDICINE | Facility: CLINIC | Age: 48
End: 2023-01-30
Payer: MEDICARE

## 2023-01-30 ENCOUNTER — LAB VISIT (OUTPATIENT)
Dept: LAB | Facility: HOSPITAL | Age: 48
End: 2023-01-30
Attending: INTERNAL MEDICINE
Payer: MEDICARE

## 2023-01-30 DIAGNOSIS — Z79.01 LONG TERM CURRENT USE OF ANTICOAGULANT THERAPY: ICD-10-CM

## 2023-01-30 DIAGNOSIS — I27.9 CHRONIC PULMONARY HEART DISEASE: ICD-10-CM

## 2023-01-30 LAB
INR PPP: 5.6 (ref 0.8–1.2)
PROTHROMBIN TIME: 53.7 SEC (ref 9–12.5)

## 2023-01-30 PROCEDURE — 85610 PROTHROMBIN TIME: CPT | Mod: HCNC | Performed by: INTERNAL MEDICINE

## 2023-01-30 PROCEDURE — 36415 COLL VENOUS BLD VENIPUNCTURE: CPT | Mod: HCNC | Performed by: INTERNAL MEDICINE

## 2023-01-31 ENCOUNTER — ANTI-COAG VISIT (OUTPATIENT)
Dept: CARDIOLOGY | Facility: CLINIC | Age: 48
End: 2023-01-31
Payer: MEDICARE

## 2023-01-31 DIAGNOSIS — I27.9 CHRONIC PULMONARY HEART DISEASE: Primary | ICD-10-CM

## 2023-01-31 DIAGNOSIS — Z79.01 LONG TERM CURRENT USE OF ANTICOAGULANT THERAPY: ICD-10-CM

## 2023-01-31 PROCEDURE — 93793 PR ANTICOAGULANT MGMT FOR PT TAKING WARFARIN: ICD-10-PCS | Mod: S$GLB,,, | Performed by: PHARMACIST

## 2023-01-31 PROCEDURE — 93793 ANTICOAG MGMT PT WARFARIN: CPT | Mod: S$GLB,,, | Performed by: PHARMACIST

## 2023-01-31 NOTE — PROGRESS NOTES
Patient reports taking warfarin 10mg daily except 5mg on Thursdays . Denies any other changes.  INR not at goal. Medications, chart, and patient findings reviewed. See calendar for adjustments to dose and follow up plan.  Patient was re-educated on situations that would require placing a call to the coumadin clinic, including bleeding or unusual bruising issues, changes in health, diet or medications, upcoming procedures that require warfarin interruption, and missed coumadin dose(s). Patient expressed understanding that avoidance of consistency with these parameters could cause fluctuations in INR, leading to more frequent visits and increase risk of adverse events.

## 2023-01-31 NOTE — TELEPHONE ENCOUNTER
Called by lab regarding critical INR of 5.4.  Called pt who reports no noticeable bleeding.  Counseled to skip this evenings dose and will be contacted by coumadin clinic team for further instructions.

## 2023-02-09 DIAGNOSIS — Z00.00 ENCOUNTER FOR MEDICARE ANNUAL WELLNESS EXAM: ICD-10-CM

## 2023-02-10 ENCOUNTER — PATIENT OUTREACH (OUTPATIENT)
Dept: ADMINISTRATIVE | Facility: HOSPITAL | Age: 48
End: 2023-02-10
Payer: MEDICARE

## 2023-02-10 ENCOUNTER — TELEPHONE (OUTPATIENT)
Dept: INTERNAL MEDICINE | Facility: CLINIC | Age: 48
End: 2023-02-10
Payer: MEDICARE

## 2023-02-10 NOTE — PROGRESS NOTES
Health Maintenance Due   Topic Date Due    TETANUS VACCINE  Never done    Pneumococcal Vaccines (Age 0-64) (2 - PCV) 10/23/2018    Colorectal Cancer Screening  Never done     Triggered LINKS. Updated Care Everywhere. Chart review completed as part of [employer] outreach.

## 2023-02-10 NOTE — TELEPHONE ENCOUNTER
Pt tested positive from covid he has muscle pain and fever 101.0 and chills he want to know if something can be sent in for his symptoms

## 2023-02-10 NOTE — TELEPHONE ENCOUNTER
----- Message from Cheng Chacon sent at 2/10/2023  1:36 PM CST -----  Type: Patient Call Back         Who called: Pt          What is the request in detail: Pt called in regarding taking a at home Covid test, and tested positive . Pt states that he has Muscle Aches , Fever 101, Cough and is very tired hasn't been getting any sleep . Pt would like to know on what should he do or can Dr Escalona can proscribe Something ?         Can the clinic reply by [facility]?no          Would the patient rather a call back or a response via [facility]? Call back          Best call back number: [phone] (mobile)          Additional Information:           Thank You

## 2023-02-13 ENCOUNTER — PATIENT MESSAGE (OUTPATIENT)
Dept: INTERNAL MEDICINE | Facility: CLINIC | Age: 48
End: 2023-02-13
Payer: MEDICARE

## 2023-02-14 ENCOUNTER — PES CALL (OUTPATIENT)
Dept: ADMINISTRATIVE | Facility: CLINIC | Age: 48
End: 2023-02-14
Payer: MEDICARE

## 2023-02-20 ENCOUNTER — PES CALL (OUTPATIENT)
Dept: ADMINISTRATIVE | Facility: CLINIC | Age: 48
End: 2023-02-20
Payer: MEDICARE

## 2023-03-14 NOTE — PROGRESS NOTES
Patient's last INR was done on 1/30/23 and was elevated at 5.6. Follow up INR was due 2/6 but patient missed and recently rescheduled to 2/27/23, which was also missed. Patient now calling for refills on warfarin. I will submit a refill on his warfarin but will emphasize importance of follow up INR ASAP.

## 2023-03-16 ENCOUNTER — LAB VISIT (OUTPATIENT)
Dept: LAB | Facility: HOSPITAL | Age: 48
End: 2023-03-16
Payer: MEDICARE

## 2023-03-16 DIAGNOSIS — I27.9 CHRONIC PULMONARY HEART DISEASE: ICD-10-CM

## 2023-03-16 DIAGNOSIS — Z79.01 LONG TERM CURRENT USE OF ANTICOAGULANT THERAPY: ICD-10-CM

## 2023-03-16 LAB
INR PPP: 3.7 (ref 0.8–1.2)
PROTHROMBIN TIME: 36 SEC (ref 9–12.5)

## 2023-03-16 PROCEDURE — 36415 COLL VENOUS BLD VENIPUNCTURE: CPT | Mod: HCNC | Performed by: INTERNAL MEDICINE

## 2023-03-16 PROCEDURE — 85610 PROTHROMBIN TIME: CPT | Mod: HCNC | Performed by: INTERNAL MEDICINE

## 2023-03-17 ENCOUNTER — ANTI-COAG VISIT (OUTPATIENT)
Dept: CARDIOLOGY | Facility: CLINIC | Age: 48
End: 2023-03-17
Payer: MEDICARE

## 2023-03-17 DIAGNOSIS — I27.9 CHRONIC PULMONARY HEART DISEASE: Primary | ICD-10-CM

## 2023-03-17 DIAGNOSIS — Z79.01 LONG TERM CURRENT USE OF ANTICOAGULANT THERAPY: ICD-10-CM

## 2023-03-17 PROCEDURE — 93793 PR ANTICOAGULANT MGMT FOR PT TAKING WARFARIN: ICD-10-PCS | Mod: S$GLB,,,

## 2023-03-17 PROCEDURE — 93793 ANTICOAG MGMT PT WARFARIN: CPT | Mod: S$GLB,,,

## 2023-03-23 ENCOUNTER — PES CALL (OUTPATIENT)
Dept: ADMINISTRATIVE | Facility: CLINIC | Age: 48
End: 2023-03-23
Payer: MEDICARE

## 2023-04-03 ENCOUNTER — LAB VISIT (OUTPATIENT)
Dept: LAB | Facility: HOSPITAL | Age: 48
End: 2023-04-03
Attending: INTERNAL MEDICINE
Payer: MEDICARE

## 2023-04-03 DIAGNOSIS — Z79.01 LONG TERM CURRENT USE OF ANTICOAGULANT THERAPY: ICD-10-CM

## 2023-04-03 DIAGNOSIS — I27.9 CHRONIC PULMONARY HEART DISEASE: ICD-10-CM

## 2023-04-03 LAB
INR PPP: 4.1 (ref 0.8–1.2)
PROTHROMBIN TIME: 39.7 SEC (ref 9–12.5)

## 2023-04-03 PROCEDURE — 36415 COLL VENOUS BLD VENIPUNCTURE: CPT | Mod: HCNC | Performed by: INTERNAL MEDICINE

## 2023-04-03 PROCEDURE — 85610 PROTHROMBIN TIME: CPT | Mod: HCNC | Performed by: INTERNAL MEDICINE

## 2023-04-04 ENCOUNTER — ANTI-COAG VISIT (OUTPATIENT)
Dept: CARDIOLOGY | Facility: CLINIC | Age: 48
End: 2023-04-04
Payer: MEDICARE

## 2023-04-04 DIAGNOSIS — Z79.01 LONG TERM CURRENT USE OF ANTICOAGULANT THERAPY: ICD-10-CM

## 2023-04-04 DIAGNOSIS — I27.9 CHRONIC PULMONARY HEART DISEASE: Primary | ICD-10-CM

## 2023-04-04 PROCEDURE — 93793 PR ANTICOAGULANT MGMT FOR PT TAKING WARFARIN: ICD-10-PCS | Mod: S$GLB,,, | Performed by: PHARMACIST

## 2023-04-04 PROCEDURE — 93793 ANTICOAG MGMT PT WARFARIN: CPT | Mod: S$GLB,,, | Performed by: PHARMACIST

## 2023-04-12 ENCOUNTER — LAB VISIT (OUTPATIENT)
Dept: LAB | Facility: HOSPITAL | Age: 48
End: 2023-04-12
Attending: INTERNAL MEDICINE
Payer: MEDICARE

## 2023-04-12 ENCOUNTER — PATIENT MESSAGE (OUTPATIENT)
Dept: ADMINISTRATIVE | Facility: HOSPITAL | Age: 48
End: 2023-04-12
Payer: MEDICARE

## 2023-04-12 DIAGNOSIS — Z79.01 LONG TERM CURRENT USE OF ANTICOAGULANT THERAPY: ICD-10-CM

## 2023-04-12 DIAGNOSIS — I27.9 CHRONIC PULMONARY HEART DISEASE: ICD-10-CM

## 2023-04-12 LAB
INR PPP: 4.5 (ref 0.8–1.2)
PROTHROMBIN TIME: 43.2 SEC (ref 9–12.5)

## 2023-04-12 PROCEDURE — 85610 PROTHROMBIN TIME: CPT | Mod: HCNC | Performed by: INTERNAL MEDICINE

## 2023-04-12 PROCEDURE — 36415 COLL VENOUS BLD VENIPUNCTURE: CPT | Mod: HCNC | Performed by: INTERNAL MEDICINE

## 2023-04-13 ENCOUNTER — ANTI-COAG VISIT (OUTPATIENT)
Dept: CARDIOLOGY | Facility: CLINIC | Age: 48
End: 2023-04-13
Payer: MEDICARE

## 2023-04-13 DIAGNOSIS — Z79.01 LONG TERM CURRENT USE OF ANTICOAGULANT THERAPY: ICD-10-CM

## 2023-04-13 DIAGNOSIS — I27.9 CHRONIC PULMONARY HEART DISEASE: Primary | ICD-10-CM

## 2023-04-13 PROCEDURE — 93793 PR ANTICOAGULANT MGMT FOR PT TAKING WARFARIN: ICD-10-PCS | Mod: S$GLB,,, | Performed by: PHARMACIST

## 2023-04-13 PROCEDURE — 93793 ANTICOAG MGMT PT WARFARIN: CPT | Mod: S$GLB,,, | Performed by: PHARMACIST

## 2023-04-13 NOTE — PROGRESS NOTES
Patient questioned regarding elevated INR. Patient confirmed proper warfarin dosing for all days except 4/10. Patient took an increased dose of 10 mg on 4/10, as opposed to the recommended 5 mg. Patient denied any other changes in medications, diet, and health. Patient endorsed bruising, but denied other s/sx of bleeding. Patient advised to visit ED for s/sx of bleeding.

## 2023-04-13 NOTE — PROGRESS NOTES
Spoke to patient regarding warfarin dosing. The patient verified that he has a white 10 mg warfarin tablet. Patient advised in on appropriate dosing per for the tablet that he has. Patient expressed understanding via verbal teach back. INR scheduled for 04.19.23.

## 2023-04-19 ENCOUNTER — LAB VISIT (OUTPATIENT)
Dept: LAB | Facility: HOSPITAL | Age: 48
End: 2023-04-19
Attending: INTERNAL MEDICINE
Payer: MEDICARE

## 2023-04-19 DIAGNOSIS — Z79.01 LONG TERM CURRENT USE OF ANTICOAGULANT THERAPY: ICD-10-CM

## 2023-04-19 DIAGNOSIS — I27.9 CHRONIC PULMONARY HEART DISEASE: ICD-10-CM

## 2023-04-19 LAB
INR PPP: 3.7 (ref 0.8–1.2)
PROTHROMBIN TIME: 36.1 SEC (ref 9–12.5)

## 2023-04-19 PROCEDURE — 85610 PROTHROMBIN TIME: CPT | Mod: HCNC | Performed by: INTERNAL MEDICINE

## 2023-04-19 PROCEDURE — 36415 COLL VENOUS BLD VENIPUNCTURE: CPT | Mod: HCNC | Performed by: INTERNAL MEDICINE

## 2023-04-20 ENCOUNTER — PES CALL (OUTPATIENT)
Dept: ADMINISTRATIVE | Facility: CLINIC | Age: 48
End: 2023-04-20
Payer: MEDICARE

## 2023-04-20 ENCOUNTER — ANTI-COAG VISIT (OUTPATIENT)
Dept: CARDIOLOGY | Facility: CLINIC | Age: 48
End: 2023-04-20
Payer: MEDICARE

## 2023-04-20 DIAGNOSIS — Z79.01 LONG TERM CURRENT USE OF ANTICOAGULANT THERAPY: ICD-10-CM

## 2023-04-20 DIAGNOSIS — I27.9 CHRONIC PULMONARY HEART DISEASE: Primary | ICD-10-CM

## 2023-04-20 PROCEDURE — 93793 ANTICOAG MGMT PT WARFARIN: CPT | Mod: S$GLB,,,

## 2023-04-20 PROCEDURE — 93793 PR ANTICOAGULANT MGMT FOR PT TAKING WARFARIN: ICD-10-PCS | Mod: S$GLB,,,

## 2023-04-21 ENCOUNTER — PATIENT MESSAGE (OUTPATIENT)
Dept: ADMINISTRATIVE | Facility: HOSPITAL | Age: 48
End: 2023-04-21
Payer: MEDICARE

## 2023-04-24 ENCOUNTER — PATIENT OUTREACH (OUTPATIENT)
Dept: ADMINISTRATIVE | Facility: HOSPITAL | Age: 48
End: 2023-04-24
Payer: MEDICARE

## 2023-04-24 NOTE — PROGRESS NOTES
Health Maintenance Due   Topic Date Due    TETANUS VACCINE  Never done    Pneumococcal Vaccines (Age 0-64) (2 - PCV) 10/23/2018    Colorectal Cancer Screening  Never done     Chart reviewed.   Immunizations: Reconciled  Orders placed: N/A  Upcoming appts to satisfy NOHEMY topics: N/A

## 2023-04-25 ENCOUNTER — OFFICE VISIT (OUTPATIENT)
Dept: CARDIOLOGY | Facility: CLINIC | Age: 48
End: 2023-04-25
Payer: MEDICARE

## 2023-04-25 VITALS
HEIGHT: 64 IN | HEART RATE: 96 BPM | SYSTOLIC BLOOD PRESSURE: 116 MMHG | BODY MASS INDEX: 41.74 KG/M2 | WEIGHT: 244.5 LBS | DIASTOLIC BLOOD PRESSURE: 60 MMHG

## 2023-04-25 DIAGNOSIS — I50.32 CHRONIC DIASTOLIC HEART FAILURE: Primary | ICD-10-CM

## 2023-04-25 DIAGNOSIS — E66.01 MORBID OBESITY WITH BMI OF 45.0-49.9, ADULT: ICD-10-CM

## 2023-04-25 DIAGNOSIS — Z99.81 OXYGEN DEPENDENT: ICD-10-CM

## 2023-04-25 DIAGNOSIS — I27.20 PULMONARY HYPERTENSION: ICD-10-CM

## 2023-04-25 DIAGNOSIS — I48.0 PAROXYSMAL ATRIAL FIBRILLATION: ICD-10-CM

## 2023-04-25 DIAGNOSIS — I10 PRIMARY HYPERTENSION: ICD-10-CM

## 2023-04-25 PROCEDURE — 3008F BODY MASS INDEX DOCD: CPT | Mod: HCNC,CPTII,GC,S$GLB | Performed by: INTERNAL MEDICINE

## 2023-04-25 PROCEDURE — 3074F SYST BP LT 130 MM HG: CPT | Mod: HCNC,CPTII,GC,S$GLB | Performed by: INTERNAL MEDICINE

## 2023-04-25 PROCEDURE — 99999 PR PBB SHADOW E&M-EST. PATIENT-LVL IV: ICD-10-PCS | Mod: PBBFAC,HCNC,GC, | Performed by: INTERNAL MEDICINE

## 2023-04-25 PROCEDURE — 3008F PR BODY MASS INDEX (BMI) DOCUMENTED: ICD-10-PCS | Mod: HCNC,CPTII,GC,S$GLB | Performed by: INTERNAL MEDICINE

## 2023-04-25 PROCEDURE — 99214 PR OFFICE/OUTPT VISIT, EST, LEVL IV, 30-39 MIN: ICD-10-PCS | Mod: HCNC,GC,S$GLB, | Performed by: INTERNAL MEDICINE

## 2023-04-25 PROCEDURE — 1159F MED LIST DOCD IN RCRD: CPT | Mod: HCNC,CPTII,GC,S$GLB | Performed by: INTERNAL MEDICINE

## 2023-04-25 PROCEDURE — 3074F PR MOST RECENT SYSTOLIC BLOOD PRESSURE < 130 MM HG: ICD-10-PCS | Mod: HCNC,CPTII,GC,S$GLB | Performed by: INTERNAL MEDICINE

## 2023-04-25 PROCEDURE — 3078F DIAST BP <80 MM HG: CPT | Mod: HCNC,CPTII,GC,S$GLB | Performed by: INTERNAL MEDICINE

## 2023-04-25 PROCEDURE — 99214 OFFICE O/P EST MOD 30 MIN: CPT | Mod: HCNC,GC,S$GLB, | Performed by: INTERNAL MEDICINE

## 2023-04-25 PROCEDURE — 99999 PR PBB SHADOW E&M-EST. PATIENT-LVL IV: CPT | Mod: PBBFAC,HCNC,GC, | Performed by: INTERNAL MEDICINE

## 2023-04-25 PROCEDURE — 1159F PR MEDICATION LIST DOCUMENTED IN MEDICAL RECORD: ICD-10-PCS | Mod: HCNC,CPTII,GC,S$GLB | Performed by: INTERNAL MEDICINE

## 2023-04-25 PROCEDURE — 3078F PR MOST RECENT DIASTOLIC BLOOD PRESSURE < 80 MM HG: ICD-10-PCS | Mod: HCNC,CPTII,GC,S$GLB | Performed by: INTERNAL MEDICINE

## 2023-04-25 NOTE — PROGRESS NOTES
Gianni Escalona MD  ECHO, [facility] in [facility] - Echo / Stress Lab on 3/18/2022  Elicia Benitez MD in [facility] [address] on 4/6/2022  Bryan Lira MD in [address] on 4/25/2023    Subjective:   Patient ID:  Yong Mcintyre is a 47 y.o. male who presents for evaluation of diastolic heart failure.    Mr. Yong Mcintyre is a 46-year-old male with past medical history significant for Heart failure with preserved ejection fraction, HTN,  pHTN, on boseten and sildenafil recently discontinued (Followed by Pulmonology at [facility]), chronic hypoxic respiratory failure (3L NC O2 at home), PFO (unsuccessful closure in 2006), AF (on coumadin), Pickwickian syndrome, obese hypoventilation syndrome, morbid obesity with BMI, , long-term use of anticoagulation who is presenting for follow-up.     Last seen 1 year ago. Saw PH clinic 5/22 [place] [facility] [place]. [place] had COVID 2/23, but was not hospitalized. Still on CPAP and 3L NC all day. He works out regularly 5 days a week. He has developed some SOB during exercise, but no chest discomfort. No angina, palps, orthopnea, PND. Weight down 50 lbs since visit last year.     RHC 3/22/22  RHC performed via the right IJ. Patient tolerated the procedure well. Elevated left but normal right side filling pressures (RA= 8 mm of Hg, PCWP= 25 mm of Hg). Moderate pulmonary HTN (PA= 58/28mm of Hg, PA mean=43 mm of Hg) in the setting of elevated left sided filling pressures. Normal cardiac index and output (CI=2.8 L/min/m2, CO=6.5 L/min).     TTE 3/18/22  Technically challenging study.  The left ventricle is normal in size with normal systolic function. The estimated ejection fraction is 55%.  The right ventricle is not well visualized.  Normal left ventricular diastolic function.  Intermediate central venous pressure (8 mmHg).      Review of Systems   Constitutional: Negative for chills, decreased appetite and fever.   HENT:  Negative for  congestion and sore throat.    Eyes:  Negative for blurred vision and discharge.   Cardiovascular:  Negative for chest pain, claudication, cyanosis, dyspnea on exertion and leg swelling.   Respiratory:  Negative for cough, hemoptysis and shortness of breath.    Endocrine: Negative for cold intolerance and heat intolerance.   Skin:  Negative for color change.   Musculoskeletal:  Negative for muscle weakness and myalgias.   Gastrointestinal:  Negative for bloating and abdominal pain.   Neurological:  Negative for dizziness, focal weakness and weakness.   Psychiatric/Behavioral:  Negative for altered mental status and depression.      Past Medical History:   Diagnosis Date    Arthritis     CHF (congestive heart failure)     Cor pulmonale 11/5/2012    Diastolic dysfunction     Gallstones     Hypertension     Left ventricular systolic dysfunction 11/5/2012    Morbid obesity 11/5/2012    Obesity     MALLIKA (obstructive sleep apnea)     PFO (patent foramen ovale) 11/5/2012    Pickwickian syndrome 11/5/2012    Pulmonary hypertension     Respiratory failure, acute-on-chronic 3/7/2014       Past Surgical History:   Procedure Laterality Date    RIGHT HEART CATHETERIZATION Right 3/22/2022    Procedure: INSERTION, CATHETER, RIGHT HEART;  Surgeon: Tony Mratínez MD;  Location: [facility] CATH LAB;  Service: Cardiology;  Laterality: Right;       Family History   Problem Relation Age of Onset    Arthritis Mother     Asthma Mother     Hypertension Father     Asthma Sister     Arthritis Maternal Grandmother     Asthma Maternal Grandmother     Diabetes Maternal Grandmother     Hypertension Maternal Grandmother     Arthritis Maternal Grandfather     Hypertension Maternal Grandfather     Hypertension Paternal Grandfather     Breast cancer Paternal Grandmother     Down syndrome Brother     Gout Brother          Current Outpatient Medications:     acetaminophen (TYLENOL ARTHRITIS ORAL), Take 2 tablets by mouth daily as needed (pain)., Disp: , Rfl:  "    ADVAIR DISKUS 250-50 mcg/dose diskus inhaler, INHALE 1 PUFF BY MOUTH( INTO THE LUNGS) TWICE DAILY, Disp: 60 each, Rfl: 3    albuterol (VENTOLIN HFA) 90 mcg/actuation inhaler, Inhale 2 puffs into the lungs every 4 (four) hours as needed for Wheezing. Rescue, Disp: 18 g, Rfl: 2    allopurinoL (ZYLOPRIM) 300 MG tablet, TAKE 1 TABLET(300 MG) BY MOUTH EVERY DAY, Disp: 90 tablet, Rfl: 3    furosemide (LASIX) 80 MG tablet, TAKE 1 TABLET(80 MG) BY MOUTH TWICE DAILY, Disp: 180 tablet, Rfl: 3    metOLazone (ZAROXOLYN) 2.5 MG tablet, TAKE 1 TABLET(2.5 MG) BY MOUTH 3 TIMES A WEEK, Disp: 30 tablet, Rfl: prn    metoprolol tartrate (LOPRESSOR) 25 MG tablet, TAKE 1 TABLET BY MOUTH TWICE DAILY, Disp: 180 tablet, Rfl: 3    multivit,calc,min/FA/K1/lycop (ONE-A-DAY MEN'S COMPLETE ORAL), Take 1 tablet by mouth once daily., Disp: , Rfl:     potassium chloride (MICRO-K) 10 MEQ CpSR, TAKE 1 CAPSULE(10 MEQ) BY MOUTH EVERY DAY, Disp: 30 capsule, Rfl: 5    tiotropium (SPIRIVA) 18 mcg inhalation capsule, Inhale 18 mcg into the lungs once daily., Disp: , Rfl:     triamcinolone acetonide (NASACORT ALLERGY NASL), 2 sprays by Nasal route once daily., Disp: , Rfl:     warfarin (COUMADIN) 10 MG tablet, TAKE 1 TABLET BY MOUTH DAILY EXCEPT 1/2 TABLET BY MOUTH ON WEDNESDAYS AND SATURDAYS OR AS DIRECTED BY COUMADIN CLINIC, Disp: 30 tablet, Rfl: 0    bosentan (TRACLEER) 125 MG Tab, TAKE 1 TABLET BY MOUTH TWICE A DAY (Patient not taking: Reported on 4/25/2023), Disp: 60 tablet, Rfl: 4  Objective:   /60 (BP Location: Left arm, Patient Position: Sitting)   Pulse 96   Ht 5' 4" (1.626 m)   Wt 110.9 kg (244 lb 7.8 oz)   BMI 41.97 kg/m²      Physical Exam  Constitutional:       General: He is not in acute distress.     Appearance: He is well-developed. He is obese.      Comments: On 3L NC   HENT:      Head: Normocephalic and atraumatic.      Right Ear: External ear normal.      Left Ear: External ear normal.   Eyes:      Conjunctiva/sclera: " Conjunctivae normal.   Cardiovascular:      Rate and Rhythm: Normal rate and regular rhythm.      Pulses: Intact distal pulses.           Radial pulses are 2+ on the right side and 2+ on the left side.      Heart sounds: Normal heart sounds.   Pulmonary:      Effort: Pulmonary effort is normal. No respiratory distress.      Breath sounds: Normal breath sounds. No wheezing.   Abdominal:      General: Bowel sounds are normal. There is no distension.      Palpations: Abdomen is soft.      Tenderness: There is no abdominal tenderness.   Musculoskeletal:         General: Normal range of motion.      Cervical back: Normal range of motion and neck supple.      Comments: +vericose veins   Skin:     General: Skin is warm and dry.   Neurological:      Mental Status: He is alert and oriented to person, place, and time.   Psychiatric:         Mood and Affect: Mood normal.         Behavior: Behavior normal.       Lab Results   Component Value Date     06/13/2022    K 3.6 06/13/2022    CL 96 06/13/2022    CO2 31 (H) 06/13/2022    BUN 36 (H) 06/13/2022    CREATININE 1.3 06/13/2022    ANIONGAP 13 06/13/2022     Lab Results   Component Value Date    HGBA1C 5.2 03/18/2022     Lab Results   Component Value Date    BNP 28 06/13/2022    BNP 31 06/03/2022    BNP <10 05/02/2022       Lab Results   Component Value Date    WBC 8.14 05/02/2022    HGB 17.4 05/02/2022    HCT 57.8 (H) 05/02/2022     05/02/2022    GRAN 5.8 05/02/2022    GRAN 71.4 05/02/2022     Lab Results   Component Value Date    CHOL 162 03/16/2022    HDL 50 03/16/2022    LDLCALC 88.0 03/16/2022    TRIG 120 03/16/2022        Assessment:     1. Chronic diastolic heart failure    2. Pulmonary hypertension    3. Paroxysmal atrial fibrillation    4. Primary hypertension    5. Morbid obesity with BMI of 45.0-49.9, adult    6. Oxygen dependent        Plan:     Repeat TTE to assess for diastolic function and pulmonary hypertension    If no improvement in GOMEZ, can  consider ischemic work-up    Continue diuretic regimen, daily weights, and low salt diet; not any longer on PH-specific therapies    Continue anticoagulation for pAF    Follow-up with PH clinic and Pulmonology    Encouraged continued aerobic exercise regimen and efforts at weight loss    RTC 1 year or sooner PRN.    Patient seen and examined with attending Cardiologist, Dr. Zaman, who agrees with management and plan.    Bryan Lira MD PGY5  Cardiovascular Medicine Fellow  [facility]  Pager: [phone]

## 2023-04-26 ENCOUNTER — LAB VISIT (OUTPATIENT)
Dept: LAB | Facility: HOSPITAL | Age: 48
End: 2023-04-26
Attending: INTERNAL MEDICINE
Payer: MEDICARE

## 2023-04-26 ENCOUNTER — ANTI-COAG VISIT (OUTPATIENT)
Dept: CARDIOLOGY | Facility: CLINIC | Age: 48
End: 2023-04-26
Payer: MEDICARE

## 2023-04-26 DIAGNOSIS — Z79.01 LONG TERM CURRENT USE OF ANTICOAGULANT THERAPY: ICD-10-CM

## 2023-04-26 DIAGNOSIS — I27.9 CHRONIC PULMONARY HEART DISEASE: Primary | ICD-10-CM

## 2023-04-26 DIAGNOSIS — I27.9 CHRONIC PULMONARY HEART DISEASE: ICD-10-CM

## 2023-04-26 LAB
INR PPP: 2.3 (ref 0.8–1.2)
PROTHROMBIN TIME: 23.2 SEC (ref 9–12.5)

## 2023-04-26 PROCEDURE — 36415 COLL VENOUS BLD VENIPUNCTURE: CPT | Mod: HCNC | Performed by: INTERNAL MEDICINE

## 2023-04-26 PROCEDURE — 85610 PROTHROMBIN TIME: CPT | Mod: HCNC | Performed by: INTERNAL MEDICINE

## 2023-04-26 PROCEDURE — 93793 ANTICOAG MGMT PT WARFARIN: CPT | Mod: S$GLB,,, | Performed by: PHARMACIST

## 2023-04-26 PROCEDURE — 93793 PR ANTICOAGULANT MGMT FOR PT TAKING WARFARIN: ICD-10-PCS | Mod: S$GLB,,, | Performed by: PHARMACIST

## 2023-05-02 ENCOUNTER — HOSPITAL ENCOUNTER (INPATIENT)
Facility: HOSPITAL | Age: 48
LOS: 2 days | Discharge: HOME | DRG: 291 | End: 2023-05-05
Attending: EMERGENCY MEDICINE | Admitting: STUDENT IN AN ORGANIZED HEALTH CARE EDUCATION/TRAINING PROGRAM
Payer: MEDICARE

## 2023-05-02 DIAGNOSIS — G47.33 OSA (OBSTRUCTIVE SLEEP APNEA): ICD-10-CM

## 2023-05-02 DIAGNOSIS — I27.9 CHRONIC PULMONARY HEART DISEASE: ICD-10-CM

## 2023-05-02 DIAGNOSIS — J96.11 CHRONIC RESPIRATORY FAILURE WITH HYPOXIA: Primary | ICD-10-CM

## 2023-05-02 DIAGNOSIS — Z99.81 OXYGEN DEPENDENT: ICD-10-CM

## 2023-05-02 DIAGNOSIS — E66.2 PICKWICKIAN SYNDROME: ICD-10-CM

## 2023-05-02 DIAGNOSIS — I50.30 (HFPEF) HEART FAILURE WITH PRESERVED EJECTION FRACTION: ICD-10-CM

## 2023-05-02 DIAGNOSIS — I27.9 CHRONIC CARDIOPULMONARY DISEASE: ICD-10-CM

## 2023-05-02 DIAGNOSIS — E66.01 MORBID OBESITY: ICD-10-CM

## 2023-05-02 DIAGNOSIS — Z79.01 LONG TERM CURRENT USE OF ANTICOAGULANT THERAPY: ICD-10-CM

## 2023-05-02 DIAGNOSIS — I50.32 CHRONIC DIASTOLIC HEART FAILURE: ICD-10-CM

## 2023-05-02 DIAGNOSIS — R06.02 SHORTNESS OF BREATH: ICD-10-CM

## 2023-05-02 DIAGNOSIS — R07.9 CHEST PAIN: ICD-10-CM

## 2023-05-02 DIAGNOSIS — I27.81 COR PULMONALE: ICD-10-CM

## 2023-05-02 LAB
ALBUMIN SERPL BCP-MCNC: 2.8 G/DL (ref 3.5–5.2)
ALLENS TEST: ABNORMAL
ALP SERPL-CCNC: 151 U/L (ref 55–135)
ALT SERPL W/O P-5'-P-CCNC: 31 U/L (ref 10–44)
ANION GAP SERPL CALC-SCNC: 7 MMOL/L (ref 8–16)
AST SERPL-CCNC: 44 U/L (ref 10–40)
BASOPHILS # BLD AUTO: 0.06 K/UL (ref 0–0.2)
BASOPHILS NFR BLD: 0.7 % (ref 0–1.9)
BILIRUB SERPL-MCNC: 0.9 MG/DL (ref 0.1–1)
BNP SERPL-MCNC: 31 PG/ML (ref 0–99)
BUN SERPL-MCNC: 48 MG/DL (ref 6–20)
CALCIUM SERPL-MCNC: 9.1 MG/DL (ref 8.7–10.5)
CHLORIDE SERPL-SCNC: 93 MMOL/L (ref 95–110)
CO2 SERPL-SCNC: 37 MMOL/L (ref 23–29)
CREAT SERPL-MCNC: 1.5 MG/DL (ref 0.5–1.4)
DIFFERENTIAL METHOD: ABNORMAL
EOSINOPHIL # BLD AUTO: 0.3 K/UL (ref 0–0.5)
EOSINOPHIL NFR BLD: 3.2 % (ref 0–8)
ERYTHROCYTE [DISTWIDTH] IN BLOOD BY AUTOMATED COUNT: 15.7 % (ref 11.5–14.5)
EST. GFR  (NO RACE VARIABLE): 57.4 ML/MIN/1.73 M^2
GLUCOSE SERPL-MCNC: 99 MG/DL (ref 70–110)
HCO3 UR-SCNC: 43.6 MMOL/L (ref 24–28)
HCT VFR BLD AUTO: 54.4 % (ref 40–54)
HGB BLD-MCNC: 16.7 G/DL (ref 14–18)
IMM GRANULOCYTES # BLD AUTO: 0.02 K/UL (ref 0–0.04)
IMM GRANULOCYTES NFR BLD AUTO: 0.2 % (ref 0–0.5)
LYMPHOCYTES # BLD AUTO: 1.5 K/UL (ref 1–4.8)
LYMPHOCYTES NFR BLD: 17.1 % (ref 18–48)
MAGNESIUM SERPL-MCNC: 1.6 MG/DL (ref 1.6–2.6)
MCH RBC QN AUTO: 28.8 PG (ref 27–31)
MCHC RBC AUTO-ENTMCNC: 30.7 G/DL (ref 32–36)
MCV RBC AUTO: 94 FL (ref 82–98)
MONOCYTES # BLD AUTO: 0.6 K/UL (ref 0.3–1)
MONOCYTES NFR BLD: 7.6 % (ref 4–15)
NEUTROPHILS # BLD AUTO: 6 K/UL (ref 1.8–7.7)
NEUTROPHILS NFR BLD: 71.2 % (ref 38–73)
NRBC BLD-RTO: 0 /100 WBC
PCO2 BLDA: 83.9 MMHG (ref 35–45)
PH SMN: 7.32 [PH] (ref 7.35–7.45)
PLATELET # BLD AUTO: 250 K/UL (ref 150–450)
PMV BLD AUTO: 10.5 FL (ref 9.2–12.9)
PO2 BLDA: 26 MMHG (ref 40–60)
POC BE: 18 MMOL/L
POC SATURATED O2: 40 % (ref 95–100)
POC TCO2: 46 MMOL/L (ref 24–29)
POTASSIUM SERPL-SCNC: 3.7 MMOL/L (ref 3.5–5.1)
PROT SERPL-MCNC: 8.3 G/DL (ref 6–8.4)
RBC # BLD AUTO: 5.79 M/UL (ref 4.6–6.2)
SAMPLE: ABNORMAL
SITE: ABNORMAL
SODIUM SERPL-SCNC: 137 MMOL/L (ref 136–145)
TROPONIN I SERPL DL<=0.01 NG/ML-MCNC: 0.01 NG/ML (ref 0–0.03)
WBC # BLD AUTO: 8.47 K/UL (ref 3.9–12.7)

## 2023-05-02 PROCEDURE — 99900035 HC TECH TIME PER 15 MIN (STAT): Mod: HCNC

## 2023-05-02 PROCEDURE — 94761 N-INVAS EAR/PLS OXIMETRY MLT: CPT | Mod: HCNC

## 2023-05-02 PROCEDURE — 83735 ASSAY OF MAGNESIUM: CPT | Mod: HCNC | Performed by: EMERGENCY MEDICINE

## 2023-05-02 PROCEDURE — 27000221 HC OXYGEN, UP TO 24 HOURS: Mod: HCNC

## 2023-05-02 PROCEDURE — 84484 ASSAY OF TROPONIN QUANT: CPT | Mod: HCNC | Performed by: EMERGENCY MEDICINE

## 2023-05-02 PROCEDURE — 99285 PR EMERGENCY DEPT VISIT,LEVEL V: ICD-10-PCS | Mod: HCNC,CS,, | Performed by: EMERGENCY MEDICINE

## 2023-05-02 PROCEDURE — 93005 ELECTROCARDIOGRAM TRACING: CPT | Mod: HCNC

## 2023-05-02 PROCEDURE — 27100171 HC OXYGEN HIGH FLOW UP TO 24 HOURS: Mod: HCNC

## 2023-05-02 PROCEDURE — 80053 COMPREHEN METABOLIC PANEL: CPT | Mod: HCNC | Performed by: EMERGENCY MEDICINE

## 2023-05-02 PROCEDURE — 85025 COMPLETE CBC W/AUTO DIFF WBC: CPT | Mod: HCNC | Performed by: EMERGENCY MEDICINE

## 2023-05-02 PROCEDURE — 93010 EKG 12-LEAD: ICD-10-PCS | Mod: HCNC,,, | Performed by: INTERNAL MEDICINE

## 2023-05-02 PROCEDURE — 83880 ASSAY OF NATRIURETIC PEPTIDE: CPT | Mod: HCNC | Performed by: EMERGENCY MEDICINE

## 2023-05-02 PROCEDURE — 93010 ELECTROCARDIOGRAM REPORT: CPT | Mod: HCNC,,, | Performed by: INTERNAL MEDICINE

## 2023-05-02 PROCEDURE — 99285 EMERGENCY DEPT VISIT HI MDM: CPT | Mod: 25,HCNC

## 2023-05-02 PROCEDURE — 82803 BLOOD GASES ANY COMBINATION: CPT | Mod: HCNC

## 2023-05-02 PROCEDURE — 99285 EMERGENCY DEPT VISIT HI MDM: CPT | Mod: HCNC,CS,, | Performed by: EMERGENCY MEDICINE

## 2023-05-02 NOTE — Clinical Note
Diagnosis: Chest pain [659553]   Future Attending Provider: MAYANK SUAREZ [[id number]]   Admitting Provider:: MAYANK SUAREZ [[id number]]

## 2023-05-03 PROBLEM — E83.42 HYPOMAGNESEMIA: Status: ACTIVE | Noted: 2023-05-03

## 2023-05-03 PROBLEM — E66.09 CLASS 2 OBESITY DUE TO EXCESS CALORIES IN ADULT: Status: ACTIVE | Noted: 2023-05-03

## 2023-05-03 PROBLEM — E87.6 HYPOKALEMIA: Status: ACTIVE | Noted: 2023-05-03

## 2023-05-03 PROBLEM — N17.9 ACUTE KIDNEY INJURY SUPERIMPOSED ON CKD: Status: ACTIVE | Noted: 2019-12-07

## 2023-05-03 PROBLEM — R07.81 CHEST PAIN, PLEURITIC: Status: ACTIVE | Noted: 2023-05-03

## 2023-05-03 PROBLEM — R07.89 ATYPICAL CHEST PAIN: Status: ACTIVE | Noted: 2023-05-03

## 2023-05-03 PROBLEM — N18.9 ACUTE KIDNEY INJURY SUPERIMPOSED ON CKD: Status: ACTIVE | Noted: 2019-12-07

## 2023-05-03 PROBLEM — I50.33 ACUTE ON CHRONIC HEART FAILURE WITH PRESERVED EJECTION FRACTION (HFPEF): Status: ACTIVE | Noted: 2023-05-03

## 2023-05-03 LAB
ALBUMIN SERPL BCP-MCNC: 2.5 G/DL (ref 3.5–5.2)
ALBUMIN SERPL BCP-MCNC: 3 G/DL (ref 3.5–5.2)
ALP SERPL-CCNC: 138 U/L (ref 55–135)
ALT SERPL W/O P-5'-P-CCNC: 28 U/L (ref 10–44)
ANION GAP SERPL CALC-SCNC: 6 MMOL/L (ref 8–16)
ANION GAP SERPL CALC-SCNC: 8 MMOL/L (ref 8–16)
ASCENDING AORTA: 2.72 CM
AST SERPL-CCNC: 33 U/L (ref 10–40)
AV INDEX (PROSTH): 0.88
AV MEAN GRADIENT: 2 MMHG
AV PEAK GRADIENT: 3 MMHG
AV VALVE AREA: 3.49 CM2
AV VELOCITY RATIO: 0.79
BASOPHILS # BLD AUTO: 0.04 K/UL (ref 0–0.2)
BASOPHILS NFR BLD: 0.5 % (ref 0–1.9)
BILIRUB SERPL-MCNC: 0.8 MG/DL (ref 0.1–1)
BILIRUB UR QL STRIP: NEGATIVE
BSA FOR ECHO PROCEDURE: 2.18 M2
BUN SERPL-MCNC: 43 MG/DL (ref 6–20)
BUN SERPL-MCNC: 46 MG/DL (ref 6–20)
CALCIUM SERPL-MCNC: 8.4 MG/DL (ref 8.7–10.5)
CALCIUM SERPL-MCNC: 9.7 MG/DL (ref 8.7–10.5)
CHLORIDE SERPL-SCNC: 94 MMOL/L (ref 95–110)
CHLORIDE SERPL-SCNC: 94 MMOL/L (ref 95–110)
CLARITY UR REFRACT.AUTO: CLEAR
CO2 SERPL-SCNC: 37 MMOL/L (ref 23–29)
CO2 SERPL-SCNC: 40 MMOL/L (ref 23–29)
COLOR UR AUTO: YELLOW
CREAT SERPL-MCNC: 1.3 MG/DL (ref 0.5–1.4)
CREAT SERPL-MCNC: 1.8 MG/DL (ref 0.5–1.4)
CRP SERPL-MCNC: 26 MG/L (ref 0–8.2)
CV ECHO LV RWT: 0.35 CM
D DIMER PPP IA.FEU-MCNC: <0.19 MG/L FEU
DIFFERENTIAL METHOD: ABNORMAL
DOP CALC AO PEAK VEL: 0.87 M/S
DOP CALC AO VTI: 13.29 CM
DOP CALC LVOT AREA: 4 CM2
DOP CALC LVOT DIAMETER: 2.25 CM
DOP CALC LVOT PEAK VEL: 0.69 M/S
DOP CALC LVOT STROKE VOLUME: 46.38 CM3
DOP CALCLVOT PEAK VEL VTI: 11.67 CM
ECHO LV POSTERIOR WALL: 0.97 CM (ref 0.6–1.1)
EJECTION FRACTION: 50 %
EOSINOPHIL # BLD AUTO: 0.3 K/UL (ref 0–0.5)
EOSINOPHIL NFR BLD: 3.9 % (ref 0–8)
ERYTHROCYTE [DISTWIDTH] IN BLOOD BY AUTOMATED COUNT: 16.4 % (ref 11.5–14.5)
ERYTHROCYTE [SEDIMENTATION RATE] IN BLOOD BY PHOTOMETRIC METHOD: 60 MM/HR (ref 0–23)
EST. GFR  (NO RACE VARIABLE): 46.1 ML/MIN/1.73 M^2
EST. GFR  (NO RACE VARIABLE): >60 ML/MIN/1.73 M^2
FRACTIONAL SHORTENING: 21 % (ref 28–44)
GLUCOSE SERPL-MCNC: 111 MG/DL (ref 70–110)
GLUCOSE SERPL-MCNC: 94 MG/DL (ref 70–110)
GLUCOSE UR QL STRIP: NEGATIVE
HCT VFR BLD AUTO: 55.4 % (ref 40–54)
HGB BLD-MCNC: 16.4 G/DL (ref 14–18)
HGB UR QL STRIP: NEGATIVE
IMM GRANULOCYTES # BLD AUTO: 0.01 K/UL (ref 0–0.04)
IMM GRANULOCYTES NFR BLD AUTO: 0.1 % (ref 0–0.5)
INFLUENZA A, MOLECULAR: NOT DETECTED
INFLUENZA B, MOLECULAR: NOT DETECTED
INR PPP: 4.2 (ref 0.8–1.2)
INTERVENTRICULAR SEPTUM: 0.85 CM (ref 0.6–1.1)
KETONES UR QL STRIP: NEGATIVE
LA MAJOR: 5.12 CM
LA MINOR: 4.77 CM
LA WIDTH: 3.02 CM
LEFT ATRIUM SIZE: 3.71 CM
LEFT ATRIUM VOLUME INDEX: 22.5 ML/M2
LEFT ATRIUM VOLUME: 47.04 CM3
LEFT INTERNAL DIMENSION IN SYSTOLE: 4.34 CM (ref 2.1–4)
LEFT VENTRICLE DIASTOLIC VOLUME INDEX: 69.86 ML/M2
LEFT VENTRICLE DIASTOLIC VOLUME: 146 ML
LEFT VENTRICLE MASS INDEX: 90 G/M2
LEFT VENTRICLE SYSTOLIC VOLUME INDEX: 40.7 ML/M2
LEFT VENTRICLE SYSTOLIC VOLUME: 85.12 ML
LEFT VENTRICULAR INTERNAL DIMENSION IN DIASTOLE: 5.48 CM (ref 3.5–6)
LEFT VENTRICULAR MASS: 187.34 G
LEUKOCYTE ESTERASE UR QL STRIP: NEGATIVE
LYMPHOCYTES # BLD AUTO: 1.8 K/UL (ref 1–4.8)
LYMPHOCYTES NFR BLD: 22.2 % (ref 18–48)
MAGNESIUM SERPL-MCNC: 1.3 MG/DL (ref 1.6–2.6)
MCH RBC QN AUTO: 27.9 PG (ref 27–31)
MCHC RBC AUTO-ENTMCNC: 29.6 G/DL (ref 32–36)
MCV RBC AUTO: 94 FL (ref 82–98)
MONOCYTES # BLD AUTO: 0.6 K/UL (ref 0.3–1)
MONOCYTES NFR BLD: 7.6 % (ref 4–15)
NEUTROPHILS # BLD AUTO: 5.2 K/UL (ref 1.8–7.7)
NEUTROPHILS NFR BLD: 65.7 % (ref 38–73)
NITRITE UR QL STRIP: NEGATIVE
NRBC BLD-RTO: 0 /100 WBC
PH UR STRIP: 6 [PH] (ref 5–8)
PHOSPHATE SERPL-MCNC: 2.4 MG/DL (ref 2.7–4.5)
PLATELET # BLD AUTO: 225 K/UL (ref 150–450)
PMV BLD AUTO: 11 FL (ref 9.2–12.9)
POTASSIUM SERPL-SCNC: 2.9 MMOL/L (ref 3.5–5.1)
POTASSIUM SERPL-SCNC: 3.1 MMOL/L (ref 3.5–5.1)
POTASSIUM SERPL-SCNC: 4.5 MMOL/L (ref 3.5–5.1)
PROT SERPL-MCNC: 7.4 G/DL (ref 6–8.4)
PROT UR QL STRIP: NEGATIVE
PROTHROMBIN TIME: 41.2 SEC (ref 9–12.5)
RA MAJOR: 5.21 CM
RA PRESSURE: 3 MMHG
RA WIDTH: 3.94 CM
RBC # BLD AUTO: 5.88 M/UL (ref 4.6–6.2)
RIGHT VENTRICULAR END-DIASTOLIC DIMENSION: 4.94 CM
RSV AG BY MOLECULAR METHOD: NOT DETECTED
SARS-COV-2 RNA RESP QL NAA+PROBE: NOT DETECTED
SINUS: 2.65 CM
SODIUM SERPL-SCNC: 139 MMOL/L (ref 136–145)
SODIUM SERPL-SCNC: 140 MMOL/L (ref 136–145)
SP GR UR STRIP: 1.01 (ref 1–1.03)
STJ: 2.74 CM
TDI LATERAL: 0.1 M/S
TDI SEPTAL: 0.08 M/S
TDI: 0.09 M/S
TRICUSPID ANNULAR PLANE SYSTOLIC EXCURSION: 2.02 CM
TROPONIN I SERPL DL<=0.01 NG/ML-MCNC: <0.006 NG/ML (ref 0–0.03)
URN SPEC COLLECT METH UR: NORMAL
WBC # BLD AUTO: 7.9 K/UL (ref 3.9–12.7)

## 2023-05-03 PROCEDURE — 96365 THER/PROPH/DIAG IV INF INIT: CPT

## 2023-05-03 PROCEDURE — 25000003 PHARM REV CODE 250: Mod: HCNC | Performed by: STUDENT IN AN ORGANIZED HEALTH CARE EDUCATION/TRAINING PROGRAM

## 2023-05-03 PROCEDURE — 27100171 HC OXYGEN HIGH FLOW UP TO 24 HOURS: Mod: HCNC

## 2023-05-03 PROCEDURE — 85610 PROTHROMBIN TIME: CPT | Mod: HCNC | Performed by: STUDENT IN AN ORGANIZED HEALTH CARE EDUCATION/TRAINING PROGRAM

## 2023-05-03 PROCEDURE — 94640 AIRWAY INHALATION TREATMENT: CPT | Mod: HCNC,XB

## 2023-05-03 PROCEDURE — 81003 URINALYSIS AUTO W/O SCOPE: CPT | Mod: HCNC | Performed by: STUDENT IN AN ORGANIZED HEALTH CARE EDUCATION/TRAINING PROGRAM

## 2023-05-03 PROCEDURE — 80053 COMPREHEN METABOLIC PANEL: CPT | Mod: HCNC | Performed by: STUDENT IN AN ORGANIZED HEALTH CARE EDUCATION/TRAINING PROGRAM

## 2023-05-03 PROCEDURE — 99223 1ST HOSP IP/OBS HIGH 75: CPT | Mod: HCNC,,, | Performed by: STUDENT IN AN ORGANIZED HEALTH CARE EDUCATION/TRAINING PROGRAM

## 2023-05-03 PROCEDURE — 83735 ASSAY OF MAGNESIUM: CPT | Mod: HCNC | Performed by: STUDENT IN AN ORGANIZED HEALTH CARE EDUCATION/TRAINING PROGRAM

## 2023-05-03 PROCEDURE — 96367 TX/PROPH/DG ADDL SEQ IV INF: CPT | Mod: HCNC

## 2023-05-03 PROCEDURE — 96365 THER/PROPH/DIAG IV INF INIT: CPT | Mod: HCNC

## 2023-05-03 PROCEDURE — 96375 TX/PRO/DX INJ NEW DRUG ADDON: CPT | Mod: HCNC

## 2023-05-03 PROCEDURE — 96375 TX/PRO/DX INJ NEW DRUG ADDON: CPT

## 2023-05-03 PROCEDURE — 25000242 PHARM REV CODE 250 ALT 637 W/ HCPCS: Mod: HCNC | Performed by: STUDENT IN AN ORGANIZED HEALTH CARE EDUCATION/TRAINING PROGRAM

## 2023-05-03 PROCEDURE — 99900035 HC TECH TIME PER 15 MIN (STAT): Mod: HCNC

## 2023-05-03 PROCEDURE — 85652 RBC SED RATE AUTOMATED: CPT | Mod: HCNC | Performed by: STUDENT IN AN ORGANIZED HEALTH CARE EDUCATION/TRAINING PROGRAM

## 2023-05-03 PROCEDURE — 63600175 PHARM REV CODE 636 W HCPCS: Mod: HCNC | Performed by: STUDENT IN AN ORGANIZED HEALTH CARE EDUCATION/TRAINING PROGRAM

## 2023-05-03 PROCEDURE — 84484 ASSAY OF TROPONIN QUANT: CPT | Mod: HCNC | Performed by: STUDENT IN AN ORGANIZED HEALTH CARE EDUCATION/TRAINING PROGRAM

## 2023-05-03 PROCEDURE — 85025 COMPLETE CBC W/AUTO DIFF WBC: CPT | Mod: HCNC | Performed by: STUDENT IN AN ORGANIZED HEALTH CARE EDUCATION/TRAINING PROGRAM

## 2023-05-03 PROCEDURE — 63600175 PHARM REV CODE 636 W HCPCS: Mod: HCNC

## 2023-05-03 PROCEDURE — 99900031 HC PATIENT EDUCATION (STAT): Mod: HCNC

## 2023-05-03 PROCEDURE — 99223 PR INITIAL HOSPITAL CARE,LEVL III: ICD-10-PCS | Mod: HCNC,,, | Performed by: STUDENT IN AN ORGANIZED HEALTH CARE EDUCATION/TRAINING PROGRAM

## 2023-05-03 PROCEDURE — 94761 N-INVAS EAR/PLS OXIMETRY MLT: CPT | Mod: HCNC

## 2023-05-03 PROCEDURE — 94660 CPAP INITIATION&MGMT: CPT | Mod: HCNC

## 2023-05-03 PROCEDURE — 96376 TX/PRO/DX INJ SAME DRUG ADON: CPT

## 2023-05-03 PROCEDURE — 96367 TX/PROPH/DG ADDL SEQ IV INF: CPT

## 2023-05-03 PROCEDURE — 20600001 HC STEP DOWN PRIVATE ROOM: Mod: HCNC

## 2023-05-03 PROCEDURE — 86140 C-REACTIVE PROTEIN: CPT | Mod: HCNC | Performed by: STUDENT IN AN ORGANIZED HEALTH CARE EDUCATION/TRAINING PROGRAM

## 2023-05-03 PROCEDURE — 84132 ASSAY OF SERUM POTASSIUM: CPT | Mod: HCNC | Performed by: STUDENT IN AN ORGANIZED HEALTH CARE EDUCATION/TRAINING PROGRAM

## 2023-05-03 PROCEDURE — 0241U SARS-COV2 (COVID) WITH FLU/RSV BY PCR: CPT | Mod: HCNC | Performed by: STUDENT IN AN ORGANIZED HEALTH CARE EDUCATION/TRAINING PROGRAM

## 2023-05-03 PROCEDURE — 36415 COLL VENOUS BLD VENIPUNCTURE: CPT | Mod: HCNC | Performed by: STUDENT IN AN ORGANIZED HEALTH CARE EDUCATION/TRAINING PROGRAM

## 2023-05-03 PROCEDURE — 96376 TX/PRO/DX INJ SAME DRUG ADON: CPT | Mod: HCNC

## 2023-05-03 PROCEDURE — 25000003 PHARM REV CODE 250: Mod: HCNC

## 2023-05-03 PROCEDURE — 96366 THER/PROPH/DIAG IV INF ADDON: CPT | Mod: HCNC

## 2023-05-03 PROCEDURE — 85379 FIBRIN DEGRADATION QUANT: CPT | Mod: HCNC | Performed by: STUDENT IN AN ORGANIZED HEALTH CARE EDUCATION/TRAINING PROGRAM

## 2023-05-03 PROCEDURE — 96366 THER/PROPH/DIAG IV INF ADDON: CPT

## 2023-05-03 PROCEDURE — 27000190 HC CPAP FULL FACE MASK W/VALVE: Mod: HCNC

## 2023-05-03 PROCEDURE — 80069 RENAL FUNCTION PANEL: CPT | Mod: HCNC | Performed by: STUDENT IN AN ORGANIZED HEALTH CARE EDUCATION/TRAINING PROGRAM

## 2023-05-03 RX ORDER — FUROSEMIDE 10 MG/ML
80 INJECTION INTRAMUSCULAR; INTRAVENOUS
Status: DISCONTINUED | OUTPATIENT
Start: 2023-05-03 | End: 2023-05-03

## 2023-05-03 RX ORDER — POTASSIUM CHLORIDE 7.45 MG/ML
10 INJECTION INTRAVENOUS ONCE
Status: COMPLETED | OUTPATIENT
Start: 2023-05-03 | End: 2023-05-03

## 2023-05-03 RX ORDER — POTASSIUM CHLORIDE 20 MEQ/1
40 TABLET, EXTENDED RELEASE ORAL ONCE
Status: COMPLETED | OUTPATIENT
Start: 2023-05-03 | End: 2023-05-03

## 2023-05-03 RX ORDER — TALC
6 POWDER (GRAM) TOPICAL NIGHTLY PRN
Status: DISCONTINUED | OUTPATIENT
Start: 2023-05-03 | End: 2023-05-05 | Stop reason: HOSPADM

## 2023-05-03 RX ORDER — SODIUM CHLORIDE 0.9 % (FLUSH) 0.9 %
10 SYRINGE (ML) INJECTION
Status: DISCONTINUED | OUTPATIENT
Start: 2023-05-03 | End: 2023-05-05 | Stop reason: HOSPADM

## 2023-05-03 RX ORDER — FUROSEMIDE 40 MG/1
80 TABLET ORAL 2 TIMES DAILY
Status: DISCONTINUED | OUTPATIENT
Start: 2023-05-03 | End: 2023-05-03

## 2023-05-03 RX ORDER — IPRATROPIUM BROMIDE AND ALBUTEROL SULFATE 2.5; .5 MG/3ML; MG/3ML
3 SOLUTION RESPIRATORY (INHALATION) EVERY 6 HOURS
Status: DISCONTINUED | OUTPATIENT
Start: 2023-05-03 | End: 2023-05-04

## 2023-05-03 RX ORDER — ALLOPURINOL 100 MG/1
300 TABLET ORAL DAILY
Status: DISCONTINUED | OUTPATIENT
Start: 2023-05-03 | End: 2023-05-05 | Stop reason: HOSPADM

## 2023-05-03 RX ORDER — MAGNESIUM SULFATE HEPTAHYDRATE 40 MG/ML
2 INJECTION, SOLUTION INTRAVENOUS ONCE
Status: COMPLETED | OUTPATIENT
Start: 2023-05-03 | End: 2023-05-03

## 2023-05-03 RX ORDER — POTASSIUM CHLORIDE 20 MEQ/1
40 TABLET, EXTENDED RELEASE ORAL
Status: DISPENSED | OUTPATIENT
Start: 2023-05-03 | End: 2023-05-03

## 2023-05-03 RX ORDER — AMOXICILLIN 500 MG
1 CAPSULE ORAL DAILY
COMMUNITY

## 2023-05-03 RX ORDER — POTASSIUM CHLORIDE 7.45 MG/ML
10 INJECTION INTRAVENOUS
Status: DISCONTINUED | OUTPATIENT
Start: 2023-05-03 | End: 2023-05-03

## 2023-05-03 RX ORDER — MULTIVIT WITH MINERALS/HERBS
1 TABLET ORAL DAILY
COMMUNITY

## 2023-05-03 RX ORDER — LANOLIN ALCOHOL/MO/W.PET/CERES
400 CREAM (GRAM) TOPICAL ONCE
Status: COMPLETED | OUTPATIENT
Start: 2023-05-03 | End: 2023-05-03

## 2023-05-03 RX ADMIN — IPRATROPIUM BROMIDE AND ALBUTEROL SULFATE 3 ML: .5; 3 SOLUTION RESPIRATORY (INHALATION) at 08:05

## 2023-05-03 RX ADMIN — POTASSIUM CHLORIDE 40 MEQ: 1500 TABLET, EXTENDED RELEASE ORAL at 06:05

## 2023-05-03 RX ADMIN — MAGNESIUM SULFATE 2 G: 2 INJECTION INTRAVENOUS at 01:05

## 2023-05-03 RX ADMIN — POTASSIUM CHLORIDE 10 MEQ: 7.46 INJECTION, SOLUTION INTRAVENOUS at 06:05

## 2023-05-03 RX ADMIN — FUROSEMIDE 80 MG: 10 INJECTION, SOLUTION INTRAMUSCULAR; INTRAVENOUS at 05:05

## 2023-05-03 RX ADMIN — ALLOPURINOL 300 MG: 100 TABLET ORAL at 08:05

## 2023-05-03 RX ADMIN — IPRATROPIUM BROMIDE AND ALBUTEROL SULFATE 3 ML: .5; 3 SOLUTION RESPIRATORY (INHALATION) at 07:05

## 2023-05-03 RX ADMIN — IPRATROPIUM BROMIDE AND ALBUTEROL SULFATE 3 ML: .5; 3 SOLUTION RESPIRATORY (INHALATION) at 01:05

## 2023-05-03 RX ADMIN — Medication 400 MG: at 03:05

## 2023-05-03 RX ADMIN — POTASSIUM CHLORIDE 40 MEQ: 1500 TABLET, EXTENDED RELEASE ORAL at 12:05

## 2023-05-03 NOTE — ASSESSMENT & PLAN NOTE
Body mass index is 39.99 kg/m². Morbid obesity complicates all aspects of disease management from diagnostic modalities to treatment. Weight loss encouraged and health benefits explained to patient.

## 2023-05-03 NOTE — H&P
[facility] - Emergency Dept  [facility] Medicine  History & Physical    Patient Name: Yong Mcintyre  MRN: [id number]  Patient Class: OP- Observation  Admission Date: 5/2/2023  Attending Physician: Ilana Ardon MD   Primary Care Provider: Gianni Escalona MD    Patient information was obtained from patient and ER records.     Subjective:     Principal Problem:Acute and chronic respiratory failure with hypercapnia    Chief Complaint:   Chief Complaint   Patient presents with    Chest Pain     PT c/o intermittent chest tightness and SOB. 3 L of home oxygen.       HPI: Heart failure with preserved ejection fraction, HTN,  pHTN ([facility], currently on no therapy), chronic hypoxic respiratory failure (3L NC O2 at home), PFO (unsuccessful closure in 2006), AF (on coumadin), Pickwickian syndrome, OHS/MALLIKA, morbid obesity with BMI, , long-term use of anticoagulation who is presents to the ED with worsening shortness of breath and concerns his CO2 level is elevated. Patient states that since he got sick with COVID back in 2/2023 (which lasted one week) he began to feel more fatigued, was unable to go the gym, and began to feel short of breath. He used to work out about 4-5 times  a week prior to this.       Past Medical History:   Diagnosis Date    Arthritis     CHF (congestive heart failure)     Cor pulmonale 11/5/2012    Diastolic dysfunction     Gallstones     Hypertension     Left ventricular systolic dysfunction 11/5/2012    Morbid obesity 11/5/2012    Obesity     MALLIKA (obstructive sleep apnea)     PFO (patent foramen ovale) 11/5/2012    Pickwickian syndrome 11/5/2012    Pulmonary hypertension     Respiratory failure, acute-on-chronic 3/7/2014       Past Surgical History:   Procedure Laterality Date    RIGHT HEART CATHETERIZATION Right 3/22/2022    Procedure: INSERTION, CATHETER, RIGHT HEART;  Surgeon: Tony Martínez MD;  Location: [facility] CATH LAB;  Service: Cardiology;  Laterality: Right;  "      Review of patient's allergies indicates:   Allergen Reactions    Lipitor [atorvastatin] Other (See Comments)     "it makes my legs feel like jelly"  Other reaction(s): causes legs to hurt       No current facility-administered medications on file prior to encounter.     Current Outpatient Medications on File Prior to Encounter   Medication Sig    acetaminophen (TYLENOL ARTHRITIS ORAL) Take 2 tablets by mouth daily as needed (pain).    ADVAIR DISKUS 250-50 mcg/dose diskus inhaler INHALE 1 PUFF BY MOUTH( INTO THE LUNGS) TWICE DAILY    albuterol (VENTOLIN HFA) 90 mcg/actuation inhaler Inhale 2 puffs into the lungs every 4 (four) hours as needed for Wheezing. Rescue    allopurinoL (ZYLOPRIM) 300 MG tablet TAKE 1 TABLET(300 MG) BY MOUTH EVERY DAY    bosentan (TRACLEER) 125 MG Tab TAKE 1 TABLET BY MOUTH TWICE A DAY (Patient not taking: Reported on 4/25/2023)    furosemide (LASIX) 80 MG tablet TAKE 1 TABLET(80 MG) BY MOUTH TWICE DAILY    metOLazone (ZAROXOLYN) 2.5 MG tablet TAKE 1 TABLET(2.5 MG) BY MOUTH 3 TIMES A WEEK    metoprolol tartrate (LOPRESSOR) 25 MG tablet TAKE 1 TABLET BY MOUTH TWICE DAILY    multivit,calc,min/FA/K1/lycop (ONE-A-DAY MEN'S COMPLETE ORAL) Take 1 tablet by mouth once daily.    potassium chloride (MICRO-K) 10 MEQ CpSR TAKE 1 CAPSULE(10 MEQ) BY MOUTH EVERY DAY    tiotropium (SPIRIVA) 18 mcg inhalation capsule Inhale 18 mcg into the lungs once daily.    triamcinolone acetonide (NASACORT ALLERGY NASL) 2 sprays by Nasal route once daily.    warfarin (COUMADIN) 10 MG tablet TAKE 1 TABLET BY MOUTH DAILY EXCEPT 1/2 TABLET BY MOUTH ON WEDNESDAYS AND SATURDAYS OR AS DIRECTED BY COUMADIN CLINIC    [DISCONTINUED] sildenafil (REVATIO) 20 mg Tab Take 1 tablet (20 mg total) by mouth 3 (three) times daily.     Family History       Problem Relation (Age of Onset)    Arthritis Mother, Maternal Grandmother, Maternal Grandfather    Asthma Mother, Sister, Maternal Grandmother    Breast cancer " Paternal Grandmother    Diabetes Maternal Grandmother    Down syndrome Brother    Gout Brother    Hypertension Father, Maternal Grandmother, Maternal Grandfather, Paternal Grandfather          Tobacco Use    Smoking status: Never    Smokeless tobacco: Never   Substance and Sexual Activity    Alcohol use: Yes     Comment: holidays  rare    Drug use: No    Sexual activity: Not Currently     Partners: Female       Objective:     Vital Signs (Most Recent):  Temp: 98.4 °F (36.9 °C) (05/03/23 0016)  Pulse: 83 (05/03/23 0332)  Resp: 20 (05/03/23 0332)  BP: (!) 99/56 (05/03/23 0332)  SpO2: (!) 94 % (05/03/23 0332)   Vital Signs (24h Range):  Temp:  [97.9 °F (36.6 °C)-98.4 °F (36.9 °C)] 98.4 °F (36.9 °C)  Pulse:  [74-87] 83  Resp:  [17-22] 20  SpO2:  [85 %-99 %] 94 %  BP: ()/(53-72) 99/56        There is no height or weight on file to calculate BMI.    Physical Exam  Vitals reviewed.   Constitutional:       Appearance: Normal appearance. He is obese.   HENT:      Head: Atraumatic.   Eyes:      General: No scleral icterus.  Cardiovascular:      Rate and Rhythm: Normal rate.      Heart sounds: No murmur heard.  Pulmonary:      Breath sounds: No wheezing, rhonchi or rales.      Comments: Decreased air entry bilaterally  Abdominal:      General: There is no distension.      Palpations: Abdomen is soft.      Tenderness: There is no abdominal tenderness.   Musculoskeletal:      Right lower leg: No edema.      Left lower leg: No edema.   Neurological:      General: No focal deficit present.      Mental Status: Mental status is at baseline.   Psychiatric:         Mood and Affect: Mood normal.       Significant Labs: All pertinent labs within the past 24 hours have been reviewed.    Significant Imaging: I have reviewed all pertinent imaging results/findings within the past 24 hours.    Assessment/Plan:     * Acute and chronic respiratory failure with hypercapnia  Patient with Hypercapnic Respiratory failure which is Acute  on chronic.  he is on home oxygen at 3 LPM. Supplemental oxygen was provided and noted-      . Signs/symptoms of respiratory failure include- Tightness, Decreased air entry. Contributing diagnoses includes - CHF Labs and images were reviewed. Patient Has not had a recent ABG. Will treat underlying causes and adjust management of respiratory failure as follows- - Obtain ABG  - Duonebs PRN and standing  - Continue with Oxygen supplementation    Atypical chest pain  - Monitor for any recurrent chest pain, obtain STAT EKG  - EKG   - Trend Troponin  - Cardiac Monitor  - Echocardiogram  - Consider Cardiology consult after TTE if indicated    Acute on chronic heart failure with preserved ejection fraction (HFpEF)  Patient is identified as having Diastolic (HFpEF) heart failure that is Acute on chronic. CHF is currently uncontrolled due to Pulmonary edema/pleural effusion on CXR. Latest ECHO performed and demonstrates- Results for orders placed during the hospital encounter of 03/17/22    Echo    Interpretation Summary  · Technically challenging study.  · The left ventricle is normal in size with normal systolic function. The estimated ejection fraction is 55%.  · The right ventricle is not well visualized.  · Normal left ventricular diastolic function.  · Intermediate central venous pressure (8 mmHg).  . Continue Furosemide and monitor clinical status closely. Monitor on telemetry. Patient is off CHF pathway.  Monitor strict Is&Os and daily weights.  Place on fluid restriction of 1.5 L. Continue to stress to patient importance of self efficacy and  on diet for CHF. Last BNP reviewed- and noted below   Recent Labs   Lab 05/02/23  2205   BNP 31       Acute kidney injury superimposed on CKD  - Monitor renal function  - Avoid Nephrotoxic agents  - Monitor urine output       Paroxysmal atrial fibrillation  Patient with Paroxysmal (<7 days) atrial fibrillation which is controlled currently with Beta Blocker. Patient is  currently in sinus rhythm.  Anticoagulation indicated. Anticoagulation done with Warfarin. Follow up INR prior to resuming  Telemetry monitoring    Pulmonary hypertension  - Last evaluated in PH clinic back on 5/2022  - Currently on no therapy  - Repeat Echocardiogram    VTE Risk Mitigation (From admission, onward)         Ordered     IP VTE HIGH RISK PATIENT  Once         05/03/23 0300     Place sequential compression device  Until discontinued         05/03/23 0300     Place sequential compression device  Until discontinued         05/03/23 0258              On 05/03/2023, patient should be placed in hospital observation services under my care.    Roro Smith MD  Department of Hospital Medicine  [facility] - Emergency Dept   Pt reports weight gain during the summer due to increased PO intake from "mid-130's" to 148 pounds; followed by intentional 10 pounds weight loss x 1 month PTA in preparation for the transplant to 138 pounds. Weight as per flow sheets (09/19) 141 pounds -> (09/21) 148.1 pounds -?accuracy of weights and fluctuations as pt with edema and S/P LDRT.    Provided thorough education on post transplant nutrition therapy and food safety guidelines for transplant recipients. Discussed importance of thoroughly washing all fresh fruits/vegetables, importance of avoiding uncooked/raw/unpasteurized foods, avoiding pre-made deli/buffet/salad bar meals. Foods recommended as healthy well balanced diet and importance of adequate protein intakes for proper post-surgical healing discussed. Reviewed recommendations to avoid grapefruit, pomegranate and star fruit while taking immunosuppressant medication. Reviewed recommendations for moderate intake of sodium and carbohydrates with transplant medications. Reviewed effect of steroids on BG levels and importance of limiting concentrated sweets. Pt and  were receptive and expressed good understanding. All questions answered. Provided nutrition package including: USDA Food Safety for Transplant Recipients booklet; food safety and BG control handouts, fridge magnet with cooking temperatures, and RD information card.

## 2023-05-03 NOTE — HPI
Heart failure with preserved ejection fraction, HTN,  pHTN (Followed by [facility], currently on no therapy), chronic hypoxic respiratory failure (3L NC O2 at home), PFO (unsuccessful closure in 2006), AF (on coumadin), Pickwickian syndrome, OHS/MALLIKA, morbid obesity with BMI, , long-term use of anticoagulation who is presents to the ED with worsening shortness of breath and concerns his CO2 level is elevated. Patient states that since he got sick with COVID back in 2/2023 (which lasted one week) he began to feel more fatigued, was unable to go the gym, and began to feel short of breath. He used to work out about 4-5 times  a week prior to this.

## 2023-05-03 NOTE — ASSESSMENT & PLAN NOTE
Patient is identified as having Diastolic (HFpEF) heart failure that is Acute on chronic. CHF is currently uncontrolled due to Pulmonary edema/pleural effusion on CXR. Latest ECHO performed and demonstrates- Results for orders placed during the hospital encounter of 03/17/22    Echo    Interpretation Summary  · Technically challenging study.  · The left ventricle is normal in size with normal systolic function. The estimated ejection fraction is 55%.  · The right ventricle is not well visualized.  · Normal left ventricular diastolic function.  · Intermediate central venous pressure (8 mmHg).  . Continue Furosemide and monitor clinical status closely. Monitor on telemetry. Patient is off CHF pathway.  Monitor strict Is&Os and daily weights.  Place on fluid restriction of 1.5 L. Continue to stress to patient importance of self efficacy and  on diet for CHF. Last BNP reviewed- and noted below   Recent Labs   Lab 05/02/23 2205   BNP 31     Results for orders placed during the hospital encounter of 05/02/23    Echo    Interpretation Summary  · The left ventricle is normal in size with mildly decreased systolic function.  · The estimated ejection fraction is 50%.  · There are segmental left ventricular wall motion abnormalities.  · There is abnormal septal wall motion.  · Left ventricular diastolic dysfunction.  · Moderate right ventricular enlargement with mildly reduced right ventricular systolic function.  · Mild right atrial enlargement.  · Normal central venous pressure (3 mmHg).

## 2023-05-03 NOTE — ED TRIAGE NOTES
"Yong Mcintyre, a 47 y.o. male presents to the ED w/ complaint of chest pain. Reports chest tightness and SOB, 3L oxygen at home    Adult Physical Assessment  LOC: Yong Mcintyre, 47 y.o. male verified via two identifiers.  The patient is awake, alert, oriented and speaking appropriately at this time.  APPEARANCE: Patient resting comfortably and appears to be in no acute distress at this time. Patient is clean and well groomed, patient's clothing is properly fastened.  SKIN:The skin is warm and dry, color consistent with ethnicity, patient has normal skin turgor and moist mucus membranes, skin intact, no breakdown or brusing noted.  MUSCULOSKELETAL: Patient moving all extremities well, no obvious swelling or deformities noted.  RESPIRATORY: Airway is open and patent, respirations are spontaneous, patient has a normal effort and rate, no accessory muscle use noted. Reports increased SOB, 3L NC at baseline, currently on 7L NC with humidifier  CARDIAC: Patient has a normal rate and rhythm, no periphreal edema noted in any extremity, capillary refill < 3 seconds in all extremities. Reports chest tightness starting earlier today.   ABDOMEN: Soft and non tender to palpation, no abdominal distention noted. Bowel sounds present in all four quadrants.  NEUROLOGIC: Eyes open spontaneously, behavior appropriate to situation, follows commands, facial expression symmetrical, bilateral hand grasp equal and even, purposeful motor response noted, normal sensation in all extremities when touched with a finger.      Triage note:  Chief Complaint   Patient presents with    Chest Pain     PT c/o intermittent chest tightness and SOB. 3 L of home oxygen.      Review of patient's allergies indicates:   Allergen Reactions    Lipitor [atorvastatin] Other (See Comments)     "it makes my legs feel like jelly"  Other reaction(s): causes legs to hurt     Past Medical History:   Diagnosis Date    Arthritis     CHF (congestive heart failure)  "    Cor pulmonale 11/5/2012    Diastolic dysfunction     Gallstones     Hypertension     Left ventricular systolic dysfunction 11/5/2012    Morbid obesity 11/5/2012    Obesity     MALLIKA (obstructive sleep apnea)     PFO (patent foramen ovale) 11/5/2012    Pickwickian syndrome 11/5/2012    Pulmonary hypertension     Respiratory failure, acute-on-chronic 3/7/2014

## 2023-05-03 NOTE — PROVIDER PROGRESS NOTES - EMERGENCY DEPT.
Encounter Date: 5/2/2023    ED Physician Progress Notes        Physician Note:   I have assumed care for this patient from  GIANNI Ann.  I have discussed care with the previous attending.  46 y/o M with HTN, HFpEF, pulm HTN, chronic hypoxic respiratory failure on home O2 3L and MALLIKA on CPAP, worsening SOB and hypoxia since covid in feb 2023 presents with increasing SOB, O2 requirements and transient chest pain. At the time of sign out blood work pending with anticipated plan of admission for serial trops and further eval. Since assuming care, the patient's course includes: blood work relatively stable. CXR with cardiomegaly and vascular congestion, IV lasix ordered. Admit hospital medicine for diuresis and CPAP

## 2023-05-03 NOTE — ED NOTES
Patient identifiers have been checked and are correct.  Patient noted in hospital gown.     LOC: The patient is awake, alert, and aware of environment. The patient is oriented x 3 and speaking appropriately.   APPEARANCE: No acute distress noted.   PSYCHOSOCIAL: Patient is calm and cooperative.   SKIN: The skin is warm, dry  RESPIRATORY: Airway is open and patent. Bilateral chest rise and fall. Respirations are spontaneous, even and unlabored. Normal effort and rate noted. No accessory muscle use noted. Expiratory wheezing noted to R, clear on L.   CARDIAC: Patient has a normal rate and rhythm on continuous cardiac monitor. No peripheral edema noted.   ABDOMEN: Soft and non tender to palpation. No distention noted.    URINARY: Voids independently.   NEUROLOGIC: Eyes open spontaneously. Speech clear. Tolerating saliva secretions well. Able to follow commands, demonstrating ability to actively and appropriately communicate within context of current conversation. Symmetrical facial muscles. Moving all extremities. Movement is purposeful.   MUSCULOSKELETAL: No obvious deformities noted.     Side rails up x2. Call light within reach. Updated on POC.

## 2023-05-03 NOTE — ED NOTES
General: Awake and alert.  Neck: Supple  Respiratory: Mild tachypnea. On 10L NC per RT. No audible wheeze. Speaking in full sentences.  Cardiac: Well-perfused. No acrocyanosis.  Abdomen: Supple  Neurological: Moves all extremities symmetrically and equally. Answers questions appropriately.

## 2023-05-03 NOTE — NURSING
Nurses Note -- 4 Eyes      5/3/2023   6:21 PM      Skin assessed during: Transfer      [x] No Altered Skin Integrity Present    [x]Prevention Measures Documented      [] Yes- Altered Skin Integrity Present or Discovered   [] LDA Added if Not in Epic (Describe Wound)   [] New Altered Skin Integrity was Present on Admit and Documented in LDA   [] Wound Image Taken    Wound Care Consulted? No    Attending Nurse:  Danielle Coello RN     Second RN/Staff Member:  (no charge nurses available at change of shift)

## 2023-05-03 NOTE — ASSESSMENT & PLAN NOTE
Patient with Paroxysmal (<7 days) atrial fibrillation which is controlled currently with Beta Blocker. Patient is currently in sinus rhythm.  Anticoagulation indicated. Anticoagulation done with Warfarin. Follow up INR prior to resuming    · On Warfarin, PharmD consult for dosing  · INR currently supratherapeutic at 4.2

## 2023-05-03 NOTE — HOSPITAL COURSE
Mr. Mcintyre was admitted to Hospital Medicine for management of a CHF exacerbation.  He was started on Lasix 80mg IV BID. He diuresed well. He noted improvement in his shortness of breath and other symptoms. Discussed importance of bipap and medication compliance. Patient deemed ready for discharge. Plan discussed with pt, who was agreeable and amenable; medications were discussed and reviewed, outpatient follow-up arranged, ER precautions were given, all questions were answered to the pt's satisfaction, and Yong Mcintyre  was subsequently discharged.

## 2023-05-03 NOTE — SUBJECTIVE & OBJECTIVE
"Past Medical History:   Diagnosis Date    Arthritis     CHF (congestive heart failure)     Cor pulmonale 11/5/2012    Diastolic dysfunction     Gallstones     Hypertension     Left ventricular systolic dysfunction 11/5/2012    Morbid obesity 11/5/2012    Obesity     MALLIKA (obstructive sleep apnea)     PFO (patent foramen ovale) 11/5/2012    Pickwickian syndrome 11/5/2012    Pulmonary hypertension     Respiratory failure, acute-on-chronic 3/7/2014       Past Surgical History:   Procedure Laterality Date    RIGHT HEART CATHETERIZATION Right 3/22/2022    Procedure: INSERTION, CATHETER, RIGHT HEART;  Surgeon: Tony Martínez MD;  Location: [facility] CATH LAB;  Service: Cardiology;  Laterality: Right;       Review of patient's allergies indicates:   Allergen Reactions    Lipitor [atorvastatin] Other (See Comments)     "it makes my legs feel like jelly"  Other reaction(s): causes legs to hurt       No current facility-administered medications on file prior to encounter.     Current Outpatient Medications on File Prior to Encounter   Medication Sig    acetaminophen (TYLENOL ARTHRITIS ORAL) Take 2 tablets by mouth daily as needed (pain).    ADVAIR DISKUS 250-50 mcg/dose diskus inhaler INHALE 1 PUFF BY MOUTH( INTO THE LUNGS) TWICE DAILY    albuterol (VENTOLIN HFA) 90 mcg/actuation inhaler Inhale 2 puffs into the lungs every 4 (four) hours as needed for Wheezing. Rescue    allopurinoL (ZYLOPRIM) 300 MG tablet TAKE 1 TABLET(300 MG) BY MOUTH EVERY DAY    bosentan (TRACLEER) 125 MG Tab TAKE 1 TABLET BY MOUTH TWICE A DAY (Patient not taking: Reported on 4/25/2023)    furosemide (LASIX) 80 MG tablet TAKE 1 TABLET(80 MG) BY MOUTH TWICE DAILY    metOLazone (ZAROXOLYN) 2.5 MG tablet TAKE 1 TABLET(2.5 MG) BY MOUTH 3 TIMES A WEEK    metoprolol tartrate (LOPRESSOR) 25 MG tablet TAKE 1 TABLET BY MOUTH TWICE DAILY    multivit,calc,min/FA/K1/lycop (ONE-A-DAY MEN'S COMPLETE ORAL) Take 1 tablet by mouth once daily.    potassium chloride (MICRO-K) 10 " MEQ CpSR TAKE 1 CAPSULE(10 MEQ) BY MOUTH EVERY DAY    tiotropium (SPIRIVA) 18 mcg inhalation capsule Inhale 18 mcg into the lungs once daily.    triamcinolone acetonide (NASACORT ALLERGY NASL) 2 sprays by Nasal route once daily.    warfarin (COUMADIN) 10 MG tablet TAKE 1 TABLET BY MOUTH DAILY EXCEPT 1/2 TABLET BY MOUTH ON WEDNESDAYS AND SATURDAYS OR AS DIRECTED BY COUMADIN CLINIC    [DISCONTINUED] sildenafil (REVATIO) 20 mg Tab Take 1 tablet (20 mg total) by mouth 3 (three) times daily.     Family History       Problem Relation (Age of Onset)    Arthritis Mother, Maternal Grandmother, Maternal Grandfather    Asthma Mother, Sister, Maternal Grandmother    Breast cancer Paternal Grandmother    Diabetes Maternal Grandmother    Down syndrome Brother    Gout Brother    Hypertension Father, Maternal Grandmother, Maternal Grandfather, Paternal Grandfather          Tobacco Use    Smoking status: Never    Smokeless tobacco: Never   Substance and Sexual Activity    Alcohol use: Yes     Comment: holidays  rare    Drug use: No    Sexual activity: Not Currently     Partners: Female       Objective:     Vital Signs (Most Recent):  Temp: 98.4 °F (36.9 °C) (05/03/23 0016)  Pulse: 83 (05/03/23 0332)  Resp: 20 (05/03/23 0332)  BP: (!) 99/56 (05/03/23 0332)  SpO2: (!) 94 % (05/03/23 0332)   Vital Signs (24h Range):  Temp:  [97.9 °F (36.6 °C)-98.4 °F (36.9 °C)] 98.4 °F (36.9 °C)  Pulse:  [74-87] 83  Resp:  [17-22] 20  SpO2:  [85 %-99 %] 94 %  BP: ()/(53-72) 99/56        There is no height or weight on file to calculate BMI.    Physical Exam  Vitals reviewed.   Constitutional:       Appearance: Normal appearance. He is obese.   HENT:      Head: Atraumatic.   Eyes:      General: No scleral icterus.  Cardiovascular:      Rate and Rhythm: Normal rate.      Heart sounds: No murmur heard.  Pulmonary:      Breath sounds: No wheezing, rhonchi or rales.      Comments: Decreased air entry bilaterally  Abdominal:      General: There is no  distension.      Palpations: Abdomen is soft.      Tenderness: There is no abdominal tenderness.   Musculoskeletal:      Right lower leg: No edema.      Left lower leg: No edema.   Neurological:      General: No focal deficit present.      Mental Status: Mental status is at baseline.   Psychiatric:         Mood and Affect: Mood normal.       Significant Labs: All pertinent labs within the past 24 hours have been reviewed.    Significant Imaging: I have reviewed all pertinent imaging results/findings within the past 24 hours.

## 2023-05-03 NOTE — ED NOTES
Received report and assumed care of patient at this time.     LOC/ APPEARANCE: The patient is AAOx4. Pt is speaking appropriately, no slurred speech. Pt is in hospital gown. Continuous cardiac monitor, cont pulse ox, and auto BP cuff applied to patient. Pt is clean and well groomed. No JVD visible. Pt updated on POC. Bed low and locked with side rails up x2, call bell in pt reach.  SKIN: Skin is warm dry and intact, and color is consistent with ethnicity. No breakdown or brusing visible. Mucus membranes moist, acyanotic.  RESPIRATORY: Airway is open and patent. Respirations-spontaneous, SOB reported, mild tachypnea, equal bilaterally on inspiration and expiration. No accessory muscle use noted.   CARDIAC: Patient has regular heart rate. No peripheral edema noted, and patient has c/o chest pain.   ABDOMEN: Soft with no visible distention noted. Pt has no complaints of abnormal bowel movements, denies blood in stool. Pt reports normal appetite.   NEUROLOGIC: Eyes open spontaneously and facial expression symmetrical. Pt behavior appropriate to situation, and pt follows commands.   MUSCULOSKELETAL: Spontaneous movement noted to all extremities.   : No complaints of frequency, burning, urgency or blood in the urine. No complaints of incontinence.

## 2023-05-03 NOTE — ASSESSMENT & PLAN NOTE
- Monitor for any recurrent chest pain, obtain STAT EKG  - EKG   - Trend Troponin  - Cardiac Monitor  - Echocardiogram  - Consider Cardiology consult after TTE if indicated

## 2023-05-03 NOTE — ASSESSMENT & PLAN NOTE
Patient with Hypercapnic Respiratory failure which is Acute on chronic.  he is on home oxygen at 3 LPM. Supplemental oxygen was provided and noted-      . Signs/symptoms of respiratory failure include- Tightness, Decreased air entry. Contributing diagnoses includes - CHF Labs and images were reviewed. Patient Has not had a recent ABG. Will treat underlying causes and adjust management of respiratory failure as follows    · diuresis as below  · On home 3L oxygen

## 2023-05-03 NOTE — ED PROVIDER NOTES
"Encounter Date: 5/2/2023       History     Source of history:  Patient.    History obtained without limitations.      HPI:  The patient is a 47-year-old with the below past medical history who presents by personal transportation.  He complains of worsening shortness of breath, intermittent chest tightness, fatigue, and severely reduced exercise tolerance since having COVID-19 virus infection in February of this year.  He did not require hospital admission and could not take Paxlovid due to potential interactions with his regular medications.  He is on 3 L of supplemental oxygen at all times.  He takes Lasix 80 mg b.i.d. and has been compliant.  There has been no decrease in his daily urine output.  He has noticed increased extremity swelling which he attributes to poor dieting.      Review of patient's allergies indicates:   Allergen Reactions    Lipitor [atorvastatin] Other (See Comments)     "it makes my legs feel like jelly"  Other reaction(s): causes legs to hurt     Past Medical History:   Diagnosis Date    Arthritis     CHF (congestive heart failure)     Cor pulmonale 11/5/2012    Diastolic dysfunction     Gallstones     Hypertension     Left ventricular systolic dysfunction 11/5/2012    Morbid obesity 11/5/2012    Obesity     MALLIKA (obstructive sleep apnea)     PFO (patent foramen ovale) 11/5/2012    Pickwickian syndrome 11/5/2012    Pulmonary hypertension     Respiratory failure, acute-on-chronic 3/7/2014     Past Surgical History:   Procedure Laterality Date    RIGHT HEART CATHETERIZATION Right 3/22/2022    Procedure: INSERTION, CATHETER, RIGHT HEART;  Surgeon: Tony Martínez MD;  Location: [facility] CATH LAB;  Service: Cardiology;  Laterality: Right;     Family History   Problem Relation Age of Onset    Arthritis Mother     Asthma Mother     Hypertension Father     Asthma Sister     Arthritis Maternal Grandmother     Asthma Maternal Grandmother     Diabetes Maternal Grandmother     Hypertension Maternal " Grandmother     Arthritis Maternal Grandfather     Hypertension Maternal Grandfather     Hypertension Paternal Grandfather     Breast cancer Paternal Grandmother     Down syndrome Brother     Gout Brother      Social History     Tobacco Use    Smoking status: Never    Smokeless tobacco: Never   Substance Use Topics    Alcohol use: Yes     Comment: holidays  rare    Drug use: No     Review of Systems  See HPI.  Remainder of 10 point systems review negative.    Physical Exam     Initial Vitals   BP Pulse Resp Temp SpO2   05/02/23 2132 05/02/23 2131 05/02/23 2132 05/02/23 2131 05/02/23 2131   121/72 87 (!) 22 97.9 °F (36.6 °C) (!) 85 %      MAP       --                Physical Exam    Nursing note and vitals reviewed.  Constitutional: He is not diaphoretic. No distress.   HENT:   Head: Normocephalic and atraumatic.   Mouth/Throat: Oropharynx is clear and moist.   Eyes: Conjunctivae are normal. No scleral icterus.   Cardiovascular:  Normal rate, regular rhythm and normal heart sounds.     Exam reveals no gallop and no friction rub.       No murmur heard.  Pulmonary/Chest: No accessory muscle usage or stridor. Tachypnea noted. He has decreased breath sounds in the right lower field and the left lower field. He has no wheezes. He has no rhonchi. He has rales in the right lower field and the left lower field.   Abdominal: Abdomen is soft. Bowel sounds are normal. He exhibits no distension. There is no abdominal tenderness.   Musculoskeletal:         General: Edema present. No tenderness.      Comments: Mild nonpitting edema to the lower legs and feet.     Neurological: He is alert and oriented to person, place, and time. GCS score is 15. GCS eye subscore is 4. GCS verbal subscore is 5. GCS motor subscore is 6.   Skin: Skin is warm and dry. No pallor.       ED Course   Procedures  Labs Reviewed   CBC W/ AUTO DIFFERENTIAL - Abnormal; Notable for the following components:       Result Value    Hematocrit 54.4 (*)     MCHC  30.7 (*)     RDW 15.7 (*)     Lymph % 17.1 (*)     All other components within normal limits   COMPREHENSIVE METABOLIC PANEL - Abnormal; Notable for the following components:    Chloride 93 (*)     CO2 37 (*)     BUN 48 (*)     Creatinine 1.5 (*)     Albumin 2.8 (*)     Alkaline Phosphatase 151 (*)     AST 44 (*)     Anion Gap 7 (*)     eGFR 57.4 (*)     All other components within normal limits   CBC W/ AUTO DIFFERENTIAL - Abnormal; Notable for the following components:    Hematocrit 55.4 (*)     MCHC 29.6 (*)     RDW 16.4 (*)     All other components within normal limits   COMPREHENSIVE METABOLIC PANEL - Abnormal; Notable for the following components:    Potassium 2.9 (*)     Chloride 94 (*)     CO2 40 (*)     BUN 46 (*)     Calcium 8.4 (*)     Albumin 2.5 (*)     Alkaline Phosphatase 138 (*)     Anion Gap 6 (*)     All other components within normal limits   PROTIME-INR - Abnormal; Notable for the following components:    Prothrombin Time 41.2 (*)     INR 4.2 (*)     All other components within normal limits   MAGNESIUM - Abnormal; Notable for the following components:    Magnesium 1.3 (*)     All other components within normal limits   POTASSIUM - Abnormal; Notable for the following components:    Potassium 3.1 (*)     All other components within normal limits   SEDIMENTATION RATE - Abnormal; Notable for the following components:    Sed Rate 60 (*)     All other components within normal limits    Narrative:     ADD ON ESR AS PER DR. ILANA ARDON 05/03/2023  09:01    C-REACTIVE PROTEIN - Abnormal; Notable for the following components:    CRP 26.0 (*)     All other components within normal limits    Narrative:     add on crp per Ilana Ardon MD order# [id number] 05/03/2023 @ 09:51    ISTAT PROCEDURE - Abnormal; Notable for the following components:    POC PH 7.324 (*)     POC PCO2 83.9 (*)     POC PO2 26 (*)     POC HCO3 43.6 (*)     POC SATURATED O2 40 (*)     POC TCO2 46 (*)     All other components within normal  limits   TROPONIN I   B-TYPE NATRIURETIC PEPTIDE   MAGNESIUM   URINALYSIS, REFLEX TO URINE CULTURE    Narrative:     Specimen Source->Urine   TROPONIN I   D DIMER, QUANTITATIVE   SARS-COV2 (COVID) WITH FLU/RSV BY PCR   SEDIMENTATION RATE   C-REACTIVE PROTEIN   PROCALCITONIN            Imaging Results              CT Chest Without Contrast (Final result)  Result time 05/03/23 14:10:52      Final result by Nayla De Anda MD (05/03/23 14:10:52)                   Impression:      1. Cardiogenic pulmonary interstitial edema.  2. In the setting of a dilated pulmonary artery and pulmonary arterial hypertension, mosaic attenuation may be related to altered perfusion dynamics.  3. Numerous centrilobular ground-glass micronodule with vague morphology; this too can be seen in the setting of pulmonary arterial hypertension.  However, pulmonary edema, infectious and non infectious bronchiolitis can also have this appearance.  4. Possible mid esophageal wall thickening versus volume averaging with adjacent mildly enlarged lymph nodes.    Electronically signed by resident: Libia Lara  Date:    05/03/2023  Time:    11:18    Electronically signed by: Nayla De Anda  Date:    05/03/2023  Time:    14:10               Narrative:    EXAMINATION:  CT CHEST WITHOUT CONTRAST    CLINICAL HISTORY:  Respiratory illness, nondiagnostic xray;    TECHNIQUE:  Low dose axial images, sagittal and coronal reformations were obtained from the thoracic inlet to the lung bases. Contrast was not administered.    All CT scans at this facility use dose modulation, iterative reconstruction and/or weight based dosing when appropriate to reduce radiation dose to as low as reasonably achievable.    COMPARISON:  Radiograph 05/02/2023, CT 02/18/2014    FINDINGS:  Thyroid gland: Small hypoattenuating right thyroid lobe nodule.    Thoracic soft tissues: There are numerous venous collateral vessels in the posterior greater than anterior body wall.    Trachea:  Within normal limits.    Cardiovascular:    The heart appears enlarged, but it is unclear which chambers are abnormal.    There is a trace pericardial effusion.    There is a severely dilated main pulmonary artery up to 4.1 cm.  There is also dilatation of the peripheral central pulmonary arteries..    Mediastinum: There is a 12 mm right lower paratracheal and a 17 mm subcarinal lymph node.  These appears stable.    Lungs/pleura/airways:    Diffuse upper lobe predominant ground-glass opacities throughout the lung parenchyma with interlobular septal thickening and relative subpleural sparing.    In addition, there is a subtle prior intern of numerous, centrilobular, vague ground-glass micro nodules.    There are areas of mosaic attenuation, similar to prior CT 02/18/2014, and likely related to pulmonary artery hypertension.  Few areas of cystic lung disease and is centrilobular emphysematous change.  Mild atelectasis versus scarring at the left lung base.    Esophagus: There may be circumferential mid esophageal wall thickening; conceivably, these could be enlarged lymph nodes abutting the esophagus.    Upper abdomen:    Partially imaged.  Mild splenomegaly.  The gallbladder is surgically absent.    Bones: Unremarkable for stated age.    Other: N/A                                       X-Ray Chest AP Portable (Final result)  Result time 05/02/23 23:20:12      Final result by Ramesh Jean Baptiste MD (05/02/23 23:20:12)                   Impression:      Pulmonary vascular congestion and pulmonary edema, similar to prior.      Electronically signed by: Ramesh Jean Baptiste MD  Date:    05/02/2023  Time:    23:20               Narrative:    EXAMINATION:  XR CHEST AP PORTABLE    CLINICAL HISTORY:  Chest tightness;    TECHNIQUE:  Single frontal view of the chest was performed.    COMPARISON:  03/20/2022.    FINDINGS:  Pulmonary vascular congestion and pulmonary edema, similar to prior.    No consolidation, pleural effusion or  pneumothorax.    Cardiomediastinal silhouette is similar to prior.                                       Medications   potassium chloride SA CR tablet 40 mEq (40 mEq Oral Given 5/3/23 1232)   magnesium oxide tablet 400 mg (400 mg Oral Given 5/3/23 0343)   potassium chloride SA CR tablet 40 mEq (40 mEq Oral Given 5/3/23 0630)   potassium chloride 10 mEq in 100 mL IVPB (0 mEq Intravenous Stopped 5/3/23 0653)   magnesium sulfate 2g in water 50mL IVPB (premix) (0 g Intravenous Stopped 5/3/23 1554)   potassium chloride SA CR tablet 40 mEq (40 mEq Oral Given 5/4/23 0856)   potassium chloride SA CR tablet 40 mEq (40 mEq Oral Given 5/5/23 0958)   magnesium sulfate 2g in water 50mL IVPB (premix) (0 g Intravenous Stopped 5/5/23 1235)     Medical Decision Making:   Differential Diagnosis:   Arrhythmia  Acute coronary syndromes   Heart failure exacerbation  Exacerbation pulmonary hypertension  Pulmonary embolism   Aortic dissection   Pericarditis  Pericardial effusion   Pleural effusion  Pneumonia  Bronchitis               ED Course as of 05/13/23 1817   Tue May 02, 2023   2226 EKG 12-lead  Independent interpretation.    Normal sinus rhythm.  Ventricular rate 79 beats per minute.  Rightward axis.  Incomplete right bundle-branch block.  Possible RVH.  Prolonged QTc interval (497 ms).  No ST segment elevation or depression.  Normal T-wave morphology. [LP]      ED Course User Index  [LP] Juanito Ann III, MD                 Clinical Impression:   Final diagnoses:  [R06.02] Shortness of breath  [J96.11] Chronic respiratory failure with hypoxia (Primary)  [R07.9] Chest pain        ED Disposition Condition    Admit                 Juanito Ann III, MD  05/13/23 1817

## 2023-05-03 NOTE — ASSESSMENT & PLAN NOTE
Patient with Hypercapnic Respiratory failure which is Acute on chronic.  he is on home oxygen at 3 LPM. Supplemental oxygen was provided and noted-      . Signs/symptoms of respiratory failure include- Tightness, Decreased air entry. Contributing diagnoses includes - CHF Labs and images were reviewed. Patient Has not had a recent ABG. Will treat underlying causes and adjust management of respiratory failure as follows- - Obtain ABG  - Duonebs PRN and standing  - Continue with Oxygen supplementation

## 2023-05-03 NOTE — ED NOTES
"Patient identifiers for Yong Mcintyre 47 y.o. male checked and correct.  Chief Complaint   Patient presents with    Chest Pain     PT c/o intermittent chest tightness and SOB. 3 L of home oxygen.      Past Medical History:   Diagnosis Date    Arthritis     CHF (congestive heart failure)     Cor pulmonale 11/5/2012    Diastolic dysfunction     Gallstones     Hypertension     Left ventricular systolic dysfunction 11/5/2012    Morbid obesity 11/5/2012    Obesity     MALLIKA (obstructive sleep apnea)     PFO (patent foramen ovale) 11/5/2012    Pickwickian syndrome 11/5/2012    Pulmonary hypertension     Respiratory failure, acute-on-chronic 3/7/2014     Allergies reported:   Review of patient's allergies indicates:   Allergen Reactions    Lipitor [atorvastatin] Other (See Comments)     "it makes my legs feel like jelly"  Other reaction(s): causes legs to hurt     Pt presents to the ED with complaints of SOB and states he feels like his "CO2 is high in his blood". Pt reports he had Covid in February and has had residual fatigue and SOB with usual activities since. Pt also endorses occasional chest tightness.     LOC: Patient is awake, alert, and aware of environment with an appropriate affect. Patient is oriented x 4 and speaking appropriately.  APPEARANCE: Patient resting comfortably and in no acute distress. Patient is clean and well groomed, patient's clothing is properly fastened.  HEENT: WDL  SKIN: The skin is warm and dry. Patient has normal skin turgor and moist mucus membranes.   MUSKULOSKELETAL: Patient is moving all extremities well, no obvious deformities noted. Pulses intact.   RESPIRATORY: Airway is open and patent. Respirations are spontaneous and non-labored with normal effort and rate. Pt on 10L humidified nasal cannula  CARDIAC: Patient has a normal rate and rhythm. 87 on cardiac monitor. No peripheral edema noted.   ABDOMEN: No distention noted. Soft and non-tender upon palpation.  NEUROLOGICAL: pupils 3 " mm, PERRL. Facial expression is symmetrical. Hand grasps are equal bilaterally. Normal sensation in all extremities when touched with finger.

## 2023-05-03 NOTE — PHARMACY MED REC
"Admission Medication History     The home medication history was taken by Krystal Wilkinson.    You may go to "Admission" then "Reconcile Home Medications" tabs to review and/or act upon these items.     The home medication list has been updated by the Pharmacy department.   Please read ALL comments highlighted in yellow.   Please address this information as you see fit.    Feel free to contact us if you have any questions or require assistance.      The medications listed below were removed from the home medication list. Please reorder if appropriate:  Patient reports no longer taking the following medication(s):  ADVAIR DISKUS 250-50 MCG  BOSENTAN 125 MG      Medications listed below were obtained from: Patient/family  Current Outpatient Medications on File Prior to Encounter   Medication Sig    acetaminophen (TYLENOL ARTHRITIS ORAL) Take 2 tablets by mouth daily as needed (pain).    albuterol (VENTOLIN HFA) 90 mcg/actuation inhaler Inhale 2 puffs into the lungs every 4 (four) hours as needed for Wheezing. Rescue    allopurinoL (ZYLOPRIM) 300 MG tablet TAKE 1 TABLET(300 MG) BY MOUTH EVERY DAY    ascorbic acid (VITAMIN C ORAL) Take 1 tablet by mouth once daily.    b complex vitamins tablet Take 1 tablet by mouth once daily.    CALCIUM ORAL Take 1 capsule by mouth once daily.    furosemide (LASIX) 80 MG tablet TAKE 1 TABLET(80 MG) BY MOUTH TWICE DAILY    metOLazone (ZAROXOLYN) 2.5 MG tablet Take 2.5 mg by mouth twice a week. Wed & Sat)    metoprolol tartrate (LOPRESSOR) 25 MG tablet TAKE 1 TABLET BY MOUTH TWICE DAILY    multivit,calc,min/FA/K1/lycop (ONE-A-DAY MEN'S COMPLETE ORAL) Take 1 tablet by mouth once daily.    omega-3 fatty acids/fish oil (FISH OIL-OMEGA-3 FATTY ACIDS) 300-1,000 mg capsule Take 1 capsule by mouth once daily.    potassium chloride (MICRO-K) 10 MEQ CpSR TAKE 1 CAPSULE(10 MEQ) BY MOUTH EVERY DAY    tiotropium (SPIRIVA) 18 mcg inhalation capsule Inhale 18 mcg into the lungs once daily.    warfarin " (COUMADIN) 10 MG tablet TAKE 10 MG EVERY Sunday AND 5 MG ON ALL OTHER DAYS AS DIRECTED BY COUMADIN CLINIC)    triamcinolone acetonide (NASACORT ALLERGY NASL) 2 sprays by Nasal route once daily.               Krystal Wilkinson  EXT [phone]                  .

## 2023-05-03 NOTE — PROGRESS NOTES
"Hospital Medicine Plan of Care Note     Admission H&P dated earlier this morning reviewed, and agree with assessment and plan as documented in addition to following:     S:  Pt seen and examined this morning on rounds, JAVIER.   Reports some improvement in terms of shortness of breath after receiving Lasix.  Questionable dietary indiscretion recently in terms of salt intake.  Also, has been intermittent with home BiPAP use (OHS).  Notes a dry weight at home of 240 lb; documented 233 lb upon admission.  Notes mild nonproductive cough.  Denies any wheezing.  Endorses pleuritic chest pain.  Denies any lightheadedness or dizziness at rest or with ambulation.    O:  Vitals:    05/03/23 0540 05/03/23 0603 05/03/23 0714 05/03/23 0723   BP:  (!) 116/57 102/62    Pulse:  80 85 88   Resp:  18 16 16   Temp:  98.2 °F (36.8 °C) 98.2 °F (36.8 °C)    TempSrc:  Oral Oral    SpO2: (!) 93% (!) 94% (!) 93% (!) 92%   Weight:    105.7 kg (233 lb)   Height:    5' 4" (1.626 m)       Physical exam largely unchanged from admission.    Labs reviewed.    Recent Labs   Lab 05/03/23  0400   WBC 7.90   RBC 5.88   HGB 16.4   HCT 55.4*      MCV 94   MCH 27.9   MCHC 29.6*     BMP  Lab Results   Component Value Date     05/03/2023    K 3.1 (L) 05/03/2023    CL 94 (L) 05/03/2023    CO2 40 (H) 05/03/2023    BUN 46 (H) 05/03/2023    CREATININE 1.3 05/03/2023    CALCIUM 8.4 (L) 05/03/2023    ANIONGAP 6 (L) 05/03/2023    EGFRNORACEVR >60.0 05/03/2023     BNP  Recent Labs   Lab 05/02/23  2205   BNP 31     Lab Results   Component Value Date    INR 4.2 (H) 05/03/2023    INR 2.3 (H) 04/26/2023    INR 3.7 (H) 04/19/2023     Recent Labs   Lab 05/03/23  0359   TROPONINI <0.006     VBG  Recent Labs   Lab 05/02/23 2206   PH 7.324*   PO2 26*   PCO2 83.9*   HCO3 43.6*   BE 18       I/O last 3 completed shifts:  In: -   Out: 600 [Urine:600]  I/O this shift:  In: -   Out: 350 [Urine:350]      A/P:    Active Problem List with Overview Notes    Diagnosis " "   Acute and chronic respiratory failure with hypercapnia    Acute kidney injury superimposed on CKD    Acute on chronic heart failure with preserved ejection fraction (HFpEF)    Pleuritic chest pain    Hypokalemia    Oxygen dependent    Orthostatic hypotension    SOB (shortness of breath)    Paroxysmal atrial fibrillation    Pickwickian syndrome    Cor pulmonale    Pulmonary hypertension    PFO (patent foramen ovale)    Obesity    COPD     On arrival, 85% oxygen saturation on 3 L via nasal cannula.  Required 10 L high-flow nasal cannula to improve sat levels; now weaned to 6 L high-flow nasal cannula; still notably above baseline supplemental oxygen needs.    BNP wnl and reports dry weight 240# at home (w/ recent 5# increase above dry weight). Unclear if home scale correlates well with hospital scale.  However, chest x-ray with pulmonary vascular congestion and pulmonary edema.  CT chest to further elucidate.  Notably, AARON, potentially cardiorenal syndrome, with improvement following initiation of IV diuresis.  Continue IV diuresis and monitor renal function carefully. F/u echo.     Patient no longer on medical therapy for pulmonary hypertension. Per last Pulm clinic visit "With major weight loss, BP has become soft. Will discontinue bosentan, because it has become apparent that PH is most likely WHO 2 and 3, rather than 1. This will also help with his relative hypotension  Continue BiPAP."    Aggressive repletion of potassium and magnesium in the setting of IV diuresis.    Bipap QHS    Consider cardiology consult if no further improvement of supplemental oxygen levels.    "

## 2023-05-03 NOTE — PROGRESS NOTES
Hospital Medicine Pharmacy Consult Note    PharmD Consult Received For:     Pharmacy to review dose of warfarin  Yong Mcintyre is a 47 y.o. male on warfarin therapy for Atrial Fibrillation, Pulmonary Hypertension. PharmD has been consulted for warfarin dosing.    Current order: NONE  Home dose: 10 mg on Sunday/Thursday, 5 mg all other days   Coumadin clinic enrollment: Active  INR goal: 2-3    Lab Results   Component Value Date    INR 4.2 (H) 05/03/2023    INR 2.3 (H) 04/26/2023    INR 3.7 (H) 04/19/2023     Significant drug interactions: NA   Diet: Adult Cardiac without supplements     Recommendation(s):   HOLD warfarin. INR supratherapeutic.   Daily INR  Pharmacy will continue to follow and monitor warfarin      Thank you for the consult,  Torrie Vickers  Extension [phone]    **Note: Consults are reviewed Monday-Friday 7:00am-3:30pm. The above recommendations are only suggested. The recommendations should be considered in conjunction with all patient factors.**

## 2023-05-03 NOTE — ASSESSMENT & PLAN NOTE
Patient is identified as having Diastolic (HFpEF) heart failure that is Acute on chronic. CHF is currently uncontrolled due to Pulmonary edema/pleural effusion on CXR. Latest ECHO performed and demonstrates- Results for orders placed during the hospital encounter of 03/17/22    Echo    Interpretation Summary  · Technically challenging study.  · The left ventricle is normal in size with normal systolic function. The estimated ejection fraction is 55%.  · The right ventricle is not well visualized.  · Normal left ventricular diastolic function.  · Intermediate central venous pressure (8 mmHg).  . Continue Furosemide and monitor clinical status closely. Monitor on telemetry. Patient is off CHF pathway.  Monitor strict Is&Os and daily weights.  Place on fluid restriction of 1.5 L. Continue to stress to patient importance of self efficacy and  on diet for CHF. Last BNP reviewed- and noted below   Recent Labs   Lab 05/02/23  9556   BNP 31

## 2023-05-03 NOTE — ASSESSMENT & PLAN NOTE
Patient with Paroxysmal (<7 days) atrial fibrillation which is controlled currently with Beta Blocker. Patient is currently in sinus rhythm.  Anticoagulation indicated. Anticoagulation done with Warfarin. Follow up INR prior to resuming  Telemetry monitoring

## 2023-05-04 LAB
ALBUMIN SERPL BCP-MCNC: 2.7 G/DL (ref 3.5–5.2)
ALP SERPL-CCNC: 131 U/L (ref 55–135)
ALT SERPL W/O P-5'-P-CCNC: 24 U/L (ref 10–44)
ANION GAP SERPL CALC-SCNC: 8 MMOL/L (ref 8–16)
AST SERPL-CCNC: 26 U/L (ref 10–40)
BILIRUB SERPL-MCNC: 1.1 MG/DL (ref 0.1–1)
BUN SERPL-MCNC: 37 MG/DL (ref 6–20)
CALCIUM SERPL-MCNC: 9.2 MG/DL (ref 8.7–10.5)
CHLORIDE SERPL-SCNC: 94 MMOL/L (ref 95–110)
CO2 SERPL-SCNC: 34 MMOL/L (ref 23–29)
CREAT SERPL-MCNC: 1.3 MG/DL (ref 0.5–1.4)
EST. GFR  (NO RACE VARIABLE): >60 ML/MIN/1.73 M^2
GLUCOSE SERPL-MCNC: 89 MG/DL (ref 70–110)
INR PPP: 2.8 (ref 0.8–1.2)
MAGNESIUM SERPL-MCNC: 1.6 MG/DL (ref 1.6–2.6)
PHOSPHATE SERPL-MCNC: 3 MG/DL (ref 2.7–4.5)
POTASSIUM SERPL-SCNC: 3.3 MMOL/L (ref 3.5–5.1)
PROCALCITONIN SERPL IA-MCNC: 0.12 NG/ML
PROT SERPL-MCNC: 7.8 G/DL (ref 6–8.4)
PROTHROMBIN TIME: 28.5 SEC (ref 9–12.5)
SODIUM SERPL-SCNC: 136 MMOL/L (ref 136–145)

## 2023-05-04 PROCEDURE — 25000242 PHARM REV CODE 250 ALT 637 W/ HCPCS: Mod: HCNC | Performed by: STUDENT IN AN ORGANIZED HEALTH CARE EDUCATION/TRAINING PROGRAM

## 2023-05-04 PROCEDURE — 27000190 HC CPAP FULL FACE MASK W/VALVE: Mod: HCNC

## 2023-05-04 PROCEDURE — 36415 COLL VENOUS BLD VENIPUNCTURE: CPT | Mod: HCNC | Performed by: STUDENT IN AN ORGANIZED HEALTH CARE EDUCATION/TRAINING PROGRAM

## 2023-05-04 PROCEDURE — 84100 ASSAY OF PHOSPHORUS: CPT | Mod: HCNC | Performed by: STUDENT IN AN ORGANIZED HEALTH CARE EDUCATION/TRAINING PROGRAM

## 2023-05-04 PROCEDURE — 27000221 HC OXYGEN, UP TO 24 HOURS: Mod: HCNC

## 2023-05-04 PROCEDURE — 83735 ASSAY OF MAGNESIUM: CPT | Mod: HCNC | Performed by: STUDENT IN AN ORGANIZED HEALTH CARE EDUCATION/TRAINING PROGRAM

## 2023-05-04 PROCEDURE — 27100171 HC OXYGEN HIGH FLOW UP TO 24 HOURS: Mod: HCNC

## 2023-05-04 PROCEDURE — 94640 AIRWAY INHALATION TREATMENT: CPT | Mod: HCNC

## 2023-05-04 PROCEDURE — 99900035 HC TECH TIME PER 15 MIN (STAT): Mod: HCNC

## 2023-05-04 PROCEDURE — 25000003 PHARM REV CODE 250: Mod: HCNC | Performed by: STUDENT IN AN ORGANIZED HEALTH CARE EDUCATION/TRAINING PROGRAM

## 2023-05-04 PROCEDURE — 99233 SBSQ HOSP IP/OBS HIGH 50: CPT | Mod: HCNC,,, | Performed by: STUDENT IN AN ORGANIZED HEALTH CARE EDUCATION/TRAINING PROGRAM

## 2023-05-04 PROCEDURE — 80053 COMPREHEN METABOLIC PANEL: CPT | Mod: HCNC | Performed by: STUDENT IN AN ORGANIZED HEALTH CARE EDUCATION/TRAINING PROGRAM

## 2023-05-04 PROCEDURE — 20600001 HC STEP DOWN PRIVATE ROOM: Mod: HCNC

## 2023-05-04 PROCEDURE — 94660 CPAP INITIATION&MGMT: CPT | Mod: HCNC

## 2023-05-04 PROCEDURE — 85610 PROTHROMBIN TIME: CPT | Mod: HCNC | Performed by: STUDENT IN AN ORGANIZED HEALTH CARE EDUCATION/TRAINING PROGRAM

## 2023-05-04 PROCEDURE — 84145 PROCALCITONIN (PCT): CPT | Mod: HCNC | Performed by: STUDENT IN AN ORGANIZED HEALTH CARE EDUCATION/TRAINING PROGRAM

## 2023-05-04 PROCEDURE — 99900031 HC PATIENT EDUCATION (STAT): Mod: HCNC

## 2023-05-04 PROCEDURE — 99233 PR SUBSEQUENT HOSPITAL CARE,LEVL III: ICD-10-PCS | Mod: HCNC,,, | Performed by: STUDENT IN AN ORGANIZED HEALTH CARE EDUCATION/TRAINING PROGRAM

## 2023-05-04 PROCEDURE — 94761 N-INVAS EAR/PLS OXIMETRY MLT: CPT | Mod: HCNC

## 2023-05-04 RX ORDER — WARFARIN 2.5 MG/1
5 TABLET ORAL DAILY
Status: DISCONTINUED | OUTPATIENT
Start: 2023-05-04 | End: 2023-05-05 | Stop reason: HOSPADM

## 2023-05-04 RX ORDER — METOPROLOL TARTRATE 25 MG/1
25 TABLET, FILM COATED ORAL 2 TIMES DAILY
Status: DISCONTINUED | OUTPATIENT
Start: 2023-05-04 | End: 2023-05-05 | Stop reason: HOSPADM

## 2023-05-04 RX ORDER — IPRATROPIUM BROMIDE AND ALBUTEROL SULFATE 2.5; .5 MG/3ML; MG/3ML
3 SOLUTION RESPIRATORY (INHALATION) EVERY 6 HOURS PRN
Status: DISCONTINUED | OUTPATIENT
Start: 2023-05-04 | End: 2023-05-05 | Stop reason: HOSPADM

## 2023-05-04 RX ORDER — POTASSIUM CHLORIDE 20 MEQ/1
40 TABLET, EXTENDED RELEASE ORAL ONCE
Status: COMPLETED | OUTPATIENT
Start: 2023-05-04 | End: 2023-05-04

## 2023-05-04 RX ORDER — FUROSEMIDE 80 MG/1
80 TABLET ORAL 2 TIMES DAILY
Status: DISCONTINUED | OUTPATIENT
Start: 2023-05-04 | End: 2023-05-05 | Stop reason: HOSPADM

## 2023-05-04 RX ADMIN — WARFARIN SODIUM 5 MG: 2.5 TABLET ORAL at 05:05

## 2023-05-04 RX ADMIN — ALLOPURINOL 300 MG: 100 TABLET ORAL at 08:05

## 2023-05-04 RX ADMIN — TIOTROPIUM BROMIDE INHALATION SPRAY 2 PUFF: 3.12 SPRAY, METERED RESPIRATORY (INHALATION) at 01:05

## 2023-05-04 RX ADMIN — POTASSIUM CHLORIDE 40 MEQ: 1500 TABLET, EXTENDED RELEASE ORAL at 08:05

## 2023-05-04 RX ADMIN — FUROSEMIDE 80 MG: 80 TABLET ORAL at 11:05

## 2023-05-04 RX ADMIN — IPRATROPIUM BROMIDE AND ALBUTEROL SULFATE 3 ML: .5; 3 SOLUTION RESPIRATORY (INHALATION) at 03:05

## 2023-05-04 RX ADMIN — FUROSEMIDE 80 MG: 80 TABLET ORAL at 05:05

## 2023-05-04 RX ADMIN — IPRATROPIUM BROMIDE AND ALBUTEROL SULFATE 3 ML: .5; 3 SOLUTION RESPIRATORY (INHALATION) at 07:05

## 2023-05-04 RX ADMIN — METOPROLOL TARTRATE 25 MG: 25 TABLET, FILM COATED ORAL at 08:05

## 2023-05-04 NOTE — CONSULTS
PHARMACY CONSULT NOTE: WARFARIN  Yong Mcintyre is a 47 y.o. male on warfarin therapy for Atrial Fibrillation. PharmD has been consulted for warfarin dosing.    Current order: n/a  Home dose: 10 mg every Sun, Thu; 5 mg all other days  Coumadin clinic enrollment: Active  INR goal: 2-3    Lab Results   Component Value Date    INR 2.8 (H) 05/04/2023    INR 4.2 (H) 05/03/2023    INR 2.3 (H) 04/26/2023     Diet:  Adult Cardiac      Recommendation(s):   Restart warfarin 5 mg PO daily  INR daily (ordered)  Pharmacy will continue to follow and monitor warfarin    Thank you for the consult,  Jacqueline Conroy, PharmD, BCPS  [phone]     **Note: Consults are reviewed Monday-Friday 7:00am-3:30pm. THE ABOVE RECOMMENDATIONS ARE ONLY SUGGESTED.THE RECOMMENDATIONS SHOULD BE CONSIDERED IN CONJUNCTION WITH ALL PATIENT FACTORS. **

## 2023-05-04 NOTE — PROGRESS NOTES
[facility] - Intensive Care ([place] [address])  [facility] Medicine  Progress Note    Patient Name: Yong Mcintyre  MRN: [id number]  Patient Class: IP- Inpatient   Admission Date: 5/2/2023  Length of Stay: 1 days  Attending Physician: Igor Jaramillo*  Primary Care Provider: Gianni Escalona MD        Subjective:     Principal Problem:Acute and chronic respiratory failure with hypercapnia        HPI:  Heart failure with preserved ejection fraction, HTN,  pHTN (Followed by [facility]ology at [facility], currently on no therapy), chronic hypoxic respiratory failure (3L NC O2 at home), PFO (unsuccessful closure in 2006), AF (on coumadin), Pickwickian syndrome, OHS/MALLIKA, morbid obesity with BMI, , long-term use of anticoagulation who is presents to the ED with worsening shortness of breath and concerns his CO2 level is elevated. Patient states that since he got sick with COVID back in 2/2023 (which lasted one week) he began to feel more fatigued, was unable to go the gym, and began to feel short of breath. He used to work out about 4-5 times  a week prior to this.         Overview/Hospital Course:  Mr. Mcintyre was admitted to Hospital Medicine for management of a CHF exacerbation.  He was started on Lasix 80mg IV BID.      Interval History: No acute events. Patient reports improvement in shortness of breath, edema  Afebrile  Weaning oxygen    Review of Systems  Objective:     Vital Signs (Most Recent):  Temp: 98.2 °F (36.8 °C) (05/04/23 1149)  Pulse: 103 (05/04/23 1441)  Resp: 18 (05/04/23 1329)  BP: 111/65 (05/04/23 1149)  SpO2: (!) 93 % (05/04/23 1333)   Vital Signs (24h Range):  Temp:  [97.3 °F (36.3 °C)-98.4 °F (36.9 °C)] 98.2 °F (36.8 °C)  Pulse:  [] 103  Resp:  [18-22] 18  SpO2:  [90 %-99 %] 93 %  BP: (109-123)/(57-65) 111/65     Weight: 105.7 kg (233 lb)  Body mass index is 39.99 kg/m².    Intake/Output Summary (Last 24 hours) at 5/4/2023 1535  Last data filed at 5/3/2023 1714  Gross per 24 hour   Intake 41.94 ml    Output 325 ml   Net -283.06 ml         Physical Exam  Vitals reviewed.   Constitutional:       Appearance: Normal appearance. He is obese.   HENT:      Head: Atraumatic.   Eyes:      General: No scleral icterus.  Cardiovascular:      Rate and Rhythm: Normal rate.      Heart sounds: No murmur heard.  Pulmonary:      Breath sounds: No wheezing, rhonchi or rales.      Comments: Decreased air entry bilaterally  Abdominal:      General: There is no distension.      Palpations: Abdomen is soft.      Tenderness: There is no abdominal tenderness.   Musculoskeletal:      Right lower leg: No edema.      Left lower leg: No edema.   Neurological:      General: No focal deficit present.      Mental Status: Mental status is at baseline.   Psychiatric:         Mood and Affect: Mood normal.           Significant Labs: All pertinent labs within the past 24 hours have been reviewed.    Significant Imaging: I have reviewed all pertinent imaging results/findings within the past 24 hours.      Assessment/Plan:      * Acute and chronic respiratory failure with hypercapnia  Patient with Hypercapnic Respiratory failure which is Acute on chronic.  he is on home oxygen at 3 LPM. Supplemental oxygen was provided and noted-      . Signs/symptoms of respiratory failure include- Tightness, Decreased air entry. Contributing diagnoses includes - CHF Labs and images were reviewed. Patient Has not had a recent ABG. Will treat underlying causes and adjust management of respiratory failure as follows    · diuresis as below  · On home 3L oxygen    Class 2 obesity due to excess calories in adult  Body mass index is 39.99 kg/m². Morbid obesity complicates all aspects of disease management from diagnostic modalities to treatment. Weight loss encouraged and health benefits explained to patient.         Hypokalemia  · K low  · Replace       Chest pain, pleuritic  · Troponin trended down  · No further chest pain  · Nitro prn    Acute on chronic heart  failure with preserved ejection fraction (HFpEF)  Patient is identified as having Diastolic (HFpEF) heart failure that is Acute on chronic. CHF is currently uncontrolled due to Pulmonary edema/pleural effusion on CXR. Latest ECHO performed and demonstrates- Results for orders placed during the hospital encounter of 03/17/22    Echo    Interpretation Summary  · Technically challenging study.  · The left ventricle is normal in size with normal systolic function. The estimated ejection fraction is 55%.  · The right ventricle is not well visualized.  · Normal left ventricular diastolic function.  · Intermediate central venous pressure (8 mmHg).  . Continue Furosemide and monitor clinical status closely. Monitor on telemetry. Patient is off CHF pathway.  Monitor strict Is&Os and daily weights.  Place on fluid restriction of 1.5 L. Continue to stress to patient importance of self efficacy and  on diet for CHF. Last BNP reviewed- and noted below   Recent Labs   Lab 05/02/23 2205   BNP 31     Results for orders placed during the hospital encounter of 05/02/23    Echo    Interpretation Summary  · The left ventricle is normal in size with mildly decreased systolic function.  · The estimated ejection fraction is 50%.  · There are segmental left ventricular wall motion abnormalities.  · There is abnormal septal wall motion.  · Left ventricular diastolic dysfunction.  · Moderate right ventricular enlargement with mildly reduced right ventricular systolic function.  · Mild right atrial enlargement.  · Normal central venous pressure (3 mmHg).      Acute kidney injury superimposed on CKD  · Resolved      Paroxysmal atrial fibrillation  Patient with Paroxysmal (<7 days) atrial fibrillation which is controlled currently with Beta Blocker. Patient is currently in sinus rhythm.  Anticoagulation indicated. Anticoagulation done with Warfarin. Follow up INR prior to resuming    · On Warfarin, PharmD consult for dosing  · INR  currently supratherapeutic at 4.2    Pulmonary hypertension  · Diuresis as above    Chronic pulmonary heart disease, unspecified  · 2/2 PHTN  · Diuresis as above      VTE Risk Mitigation (From admission, onward)         Ordered     warfarin (COUMADIN) tablet 5 mg  Daily         05/04/23 1031     IP VTE HIGH RISK PATIENT  Once         05/03/23 0300     Place sequential compression device  Until discontinued         05/03/23 0258                Discharge Planning   ESTEBAN: 5/6/2023     Code Status: Full Code   Is the patient medically ready for discharge?: No    Reason for patient still in hospital (select all that apply): Patient trending condition and Treatment  Discharge Plan A: Home with family   Discharge Delays: None known at this time        Igor Lewis MD  Department of Hospital Medicine   [facility] - Intensive Care ([place])

## 2023-05-04 NOTE — PLAN OF CARE
05/04/23 0413   Post-Acute Status   Post-Acute Authorization Other   Other Status No Post-Acute Service Needs   Discharge Delays None known at this time   Discharge Plan   Discharge Plan A Home with family   Discharge Plan B Home Health            met with pt at bed side. Pt stated he has a very large support system and he does not believe he needs any services at this time. Pt Resides alone,  and  independently ambulates. Pt has no acute needs at this time          Susannah Ruiz LMSW  Case Management  Emergency Department  [phone]

## 2023-05-04 NOTE — CARE UPDATE
RAPID RESPONSE NURSE ROUND       Rounding completed with charge RN, Torrie. No concerns verbalized at this time. Instructed to call [phone] for further concerns or assistance.

## 2023-05-04 NOTE — NURSING
Patient is AAOx4, 4L nasal cannula (3L home dose), SPO2 90-94%, NSR/ST on Airstrips, and independent.  ESTEBAN 5/6/23, home with family.  Lasix 80 mg oral BID given.  K+ 3.3, 40 mEq oral given once.  BIPAP QHS.  Patient encouraged to use urinal.  Last BM 5.5.23.  Call light within reach.

## 2023-05-04 NOTE — SUBJECTIVE & OBJECTIVE
Interval History: No acute events. Patient reports improvement in shortness of breath, edema  Afebrile  Weaning oxygen    Review of Systems  Objective:     Vital Signs (Most Recent):  Temp: 98.2 °F (36.8 °C) (05/04/23 1149)  Pulse: 103 (05/04/23 1441)  Resp: 18 (05/04/23 1329)  BP: 111/65 (05/04/23 1149)  SpO2: (!) 93 % (05/04/23 1333)   Vital Signs (24h Range):  Temp:  [97.3 °F (36.3 °C)-98.4 °F (36.9 °C)] 98.2 °F (36.8 °C)  Pulse:  [] 103  Resp:  [18-22] 18  SpO2:  [90 %-99 %] 93 %  BP: (109-123)/(57-65) 111/65     Weight: 105.7 kg (233 lb)  Body mass index is 39.99 kg/m².    Intake/Output Summary (Last 24 hours) at 5/4/2023 1535  Last data filed at 5/3/2023 1714  Gross per 24 hour   Intake 41.94 ml   Output 325 ml   Net -283.06 ml         Physical Exam  Vitals reviewed.   Constitutional:       Appearance: Normal appearance. He is obese.   HENT:      Head: Atraumatic.   Eyes:      General: No scleral icterus.  Cardiovascular:      Rate and Rhythm: Normal rate.      Heart sounds: No murmur heard.  Pulmonary:      Breath sounds: No wheezing, rhonchi or rales.      Comments: Decreased air entry bilaterally  Abdominal:      General: There is no distension.      Palpations: Abdomen is soft.      Tenderness: There is no abdominal tenderness.   Musculoskeletal:      Right lower leg: No edema.      Left lower leg: No edema.   Neurological:      General: No focal deficit present.      Mental Status: Mental status is at baseline.   Psychiatric:         Mood and Affect: Mood normal.           Significant Labs: All pertinent labs within the past 24 hours have been reviewed.    Significant Imaging: I have reviewed all pertinent imaging results/findings within the past 24 hours.

## 2023-05-04 NOTE — PLAN OF CARE
[facility] ([place] [address])  Initial Discharge Assessment       Primary Care Provider: Gianni Escalona MD    Admission Diagnosis: Shortness of breath [R06.02]  Chronic respiratory failure with hypoxia [J96.11]  Chest pain [R07.9]  (HFpEF) heart failure with preserved ejection fraction [I50.30]    Admission Date: 5/2/2023  Expected Discharge Date: 5/6/2023        Sw met with pt at bed side. Pt stated he has a very large support system and he does not believe he needs any services at this time. Pt Resides alone,  and  independently ambulates. Pt has no acute needs at this time            Discharge Barriers Identified: (P) None    Payor: HUMANA MANAGED MEDICARE / Plan: HUMANA TOTAL CARE ADVANTAGE / Product Type: Medicare Advantage /     Extended Emergency Contact Information  Primary Emergency Contact: Alphonse Mcintyre  Address: [address] [place] [address] [place] [address] [place]  Mobile Phone: [phone]  Relation: Father  Secondary Emergency Contact: Bernardino Mcintyre  Address: [address] [place] [address] [place] [address] [place]  Mobile Phone: [phone]  Relation: Mother    Discharge Plan A: (P) Home with family  Discharge Plan B: (P) Home      [id number] [address] AT [facility] & [address] [address]  Phone: [phone] Fax: [phone]    [facility] [address] [facility]  [address] [facility] [address]  Phone: [phone] Fax: [phone]    [facility]  [address] [facility] [address]  Phone: [phone] Fax: [phone]    [facility]  [address]  [facility] [address]  Phone: [phone] Fax: [phone]      Initial Assessment (most recent)       Adult Discharge Assessment - 05/04/23 0416          Discharge Assessment    Assessment Type Discharge  Planning Assessment (P)      Confirmed/corrected address, phone number and insurance Yes (P)      Confirmed Demographics Correct on Facesheet (P)      Source of Information patient (P)      Communicated ESTEBAN with patient/caregiver Yes (P)      People in Home alone (P)      Do you expect to return to your current living situation? Yes (P)      Do you have help at home or someone to help you manage your care at home? Yes (P)      Who are your caregiver(s) and their phone number(s)? Alphonse Mcintyre [phone] (P)      Prior to hospitilization cognitive status: Alert/Oriented (P)      Current cognitive status: Alert/Oriented (P)      Walking or Climbing Stairs -- (P)    pt denies use  or need at this time    Dressing/Bathing -- (P)    pt denies use  or need at this time    Home Accessibility wheelchair accessible (P)      Home Layout Able to live on 1st floor (P)      Equipment Currently Used at Home none (P)      Patient currently being followed by outpatient case management? No (P)      Do you currently have service(s) that help you manage your care at home? No (P)      Do you take prescription medications? Yes (P)      Do you have prescription coverage? Yes (P)      Do you have any problems affording any of your prescribed medications? No (P)      Is the patient taking medications as prescribed? yes (P)      How do you get to doctors appointments? car, drives self (P)      Are you on dialysis? No (P)      Do you take coumadin? No (P)      Discharge Plan A Home with family (P)      Discharge Plan B Home (P)      DME Needed Upon Discharge  none (P)      Discharge Plan discussed with: Patient (P)      Discharge Barriers Identified None (P)         Physical Activity    On average, how many days per week do you engage in moderate to strenuous exercise (like a brisk walk)? 5 days (P)      On average, how many minutes do you engage in exercise at this level? 70 min (P)         Financial Resource Strain    How hard is it for  you to pay for the very basics like food, housing, medical care, and heating? Not hard at all (P)         Housing Stability    In the last 12 months, was there a time when you were not able to pay the mortgage or rent on time? No (P)      In the last 12 months, how many places have you lived? 1 (P)      In the last 12 months, was there a time when you did not have a steady place to sleep or slept in a shelter (including now)? No (P)         Transportation Needs    In the past 12 months, has lack of transportation kept you from medical appointments or from getting medications? No (P)      In the past 12 months, has lack of transportation kept you from meetings, work, or from getting things needed for daily living? No (P)         Food Insecurity    Within the past 12 months, you worried that your food would run out before you got the money to buy more. Never true (P)      Within the past 12 months, the food you bought just didn't last and you didn't have money to get more. Never true (P)         Stress    Do you feel stress - tense, restless, nervous, or anxious, or unable to sleep at night because your mind is troubled all the time - these days? Only a little (P)         Social Connections    In a typical week, how many times do you talk on the phone with family, friends, or neighbors? More than three times a week (P)      How often do you get together with friends or relatives? Once a week (P)      How often do you attend Hindu or Shinto services? Never (P)      Do you belong to any clubs or organizations such as Hindu groups, unions, fraternal or athletic groups, or school groups? No (P)      How often do you attend meetings of the clubs or organizations you belong to? Never (P)      Are you , , , , never , or living with a partner? Never  (P)         Alcohol Use    Q1: How often do you have a drink containing alcohol? Never (P)      Q2: How many drinks containing  alcohol do you have on a typical day when you are drinking? Patient does not drink (P)      Q3: How often do you have six or more drinks on one occasion? Never (P)                           Susannah Ruiz LMSW  Case Management  Emergency Department  [phone]

## 2023-05-04 NOTE — PLAN OF CARE
05/04/23 1631   Post-Acute Status   Post-Acute Authorization Other   Other Status See Comments  (refer to [facility])   Discharge Delays None known at this time   Discharge Plan   Discharge Plan A Home with family   Discharge Plan B Home Health     CM met with patient and patient denies any needs. Patient has no post acute needs and will follow up with and f/u appointments.  Patient referred to Care at Home. Patient does not need any HME.     Rafia Ramachandran RN  Case Management  [facility]  [phone]

## 2023-05-05 VITALS
WEIGHT: 241.38 LBS | TEMPERATURE: 98 F | OXYGEN SATURATION: 91 % | SYSTOLIC BLOOD PRESSURE: 112 MMHG | HEIGHT: 64 IN | HEART RATE: 86 BPM | DIASTOLIC BLOOD PRESSURE: 60 MMHG | RESPIRATION RATE: 18 BRPM | BODY MASS INDEX: 41.21 KG/M2

## 2023-05-05 LAB
ALBUMIN SERPL BCP-MCNC: 2.7 G/DL (ref 3.5–5.2)
ALP SERPL-CCNC: 127 U/L (ref 55–135)
ALT SERPL W/O P-5'-P-CCNC: 19 U/L (ref 10–44)
ANION GAP SERPL CALC-SCNC: 8 MMOL/L (ref 8–16)
AST SERPL-CCNC: 22 U/L (ref 10–40)
BILIRUB SERPL-MCNC: 1 MG/DL (ref 0.1–1)
BUN SERPL-MCNC: 37 MG/DL (ref 6–20)
CALCIUM SERPL-MCNC: 9.5 MG/DL (ref 8.7–10.5)
CHLORIDE SERPL-SCNC: 93 MMOL/L (ref 95–110)
CO2 SERPL-SCNC: 39 MMOL/L (ref 23–29)
CREAT SERPL-MCNC: 1.2 MG/DL (ref 0.5–1.4)
EST. GFR  (NO RACE VARIABLE): >60 ML/MIN/1.73 M^2
GLUCOSE SERPL-MCNC: 98 MG/DL (ref 70–110)
INR PPP: 2.3 (ref 0.8–1.2)
MAGNESIUM SERPL-MCNC: 1.5 MG/DL (ref 1.6–2.6)
PHOSPHATE SERPL-MCNC: 3.1 MG/DL (ref 2.7–4.5)
POTASSIUM SERPL-SCNC: 3.6 MMOL/L (ref 3.5–5.1)
PROT SERPL-MCNC: 7.8 G/DL (ref 6–8.4)
PROTHROMBIN TIME: 23.2 SEC (ref 9–12.5)
SODIUM SERPL-SCNC: 140 MMOL/L (ref 136–145)

## 2023-05-05 PROCEDURE — 99239 PR HOSPITAL DISCHARGE DAY,>30 MIN: ICD-10-PCS | Mod: HCNC,,, | Performed by: STUDENT IN AN ORGANIZED HEALTH CARE EDUCATION/TRAINING PROGRAM

## 2023-05-05 PROCEDURE — 25000003 PHARM REV CODE 250: Mod: HCNC | Performed by: EMERGENCY MEDICINE

## 2023-05-05 PROCEDURE — 99239 HOSP IP/OBS DSCHRG MGMT >30: CPT | Mod: HCNC,,, | Performed by: STUDENT IN AN ORGANIZED HEALTH CARE EDUCATION/TRAINING PROGRAM

## 2023-05-05 PROCEDURE — 85610 PROTHROMBIN TIME: CPT | Mod: HCNC | Performed by: STUDENT IN AN ORGANIZED HEALTH CARE EDUCATION/TRAINING PROGRAM

## 2023-05-05 PROCEDURE — 84100 ASSAY OF PHOSPHORUS: CPT | Mod: HCNC | Performed by: STUDENT IN AN ORGANIZED HEALTH CARE EDUCATION/TRAINING PROGRAM

## 2023-05-05 PROCEDURE — 83735 ASSAY OF MAGNESIUM: CPT | Mod: HCNC | Performed by: STUDENT IN AN ORGANIZED HEALTH CARE EDUCATION/TRAINING PROGRAM

## 2023-05-05 PROCEDURE — 1111F PR DISCHARGE MEDS RECONCILED W/ CURRENT OUTPATIENT MED LIST: ICD-10-PCS | Mod: HCNC,CPTII,, | Performed by: STUDENT IN AN ORGANIZED HEALTH CARE EDUCATION/TRAINING PROGRAM

## 2023-05-05 PROCEDURE — 25000003 PHARM REV CODE 250: Mod: HCNC | Performed by: STUDENT IN AN ORGANIZED HEALTH CARE EDUCATION/TRAINING PROGRAM

## 2023-05-05 PROCEDURE — 80053 COMPREHEN METABOLIC PANEL: CPT | Mod: HCNC | Performed by: STUDENT IN AN ORGANIZED HEALTH CARE EDUCATION/TRAINING PROGRAM

## 2023-05-05 PROCEDURE — 1111F DSCHRG MED/CURRENT MED MERGE: CPT | Mod: HCNC,CPTII,, | Performed by: STUDENT IN AN ORGANIZED HEALTH CARE EDUCATION/TRAINING PROGRAM

## 2023-05-05 PROCEDURE — 63600175 PHARM REV CODE 636 W HCPCS: Mod: HCNC | Performed by: STUDENT IN AN ORGANIZED HEALTH CARE EDUCATION/TRAINING PROGRAM

## 2023-05-05 PROCEDURE — 36415 COLL VENOUS BLD VENIPUNCTURE: CPT | Mod: HCNC | Performed by: STUDENT IN AN ORGANIZED HEALTH CARE EDUCATION/TRAINING PROGRAM

## 2023-05-05 RX ORDER — POTASSIUM CHLORIDE 20 MEQ/1
40 TABLET, EXTENDED RELEASE ORAL ONCE
Status: COMPLETED | OUTPATIENT
Start: 2023-05-05 | End: 2023-05-05

## 2023-05-05 RX ORDER — MAGNESIUM SULFATE HEPTAHYDRATE 40 MG/ML
2 INJECTION, SOLUTION INTRAVENOUS ONCE
Status: COMPLETED | OUTPATIENT
Start: 2023-05-05 | End: 2023-05-05

## 2023-05-05 RX ORDER — WARFARIN 10 MG/1
TABLET ORAL
Qty: 30 TABLET | Refills: 1 | Status: SHIPPED | OUTPATIENT
Start: 2023-05-05 | End: 2023-07-28 | Stop reason: SDUPTHER

## 2023-05-05 RX ADMIN — POTASSIUM CHLORIDE 40 MEQ: 1500 TABLET, EXTENDED RELEASE ORAL at 09:05

## 2023-05-05 RX ADMIN — FUROSEMIDE 80 MG: 80 TABLET ORAL at 08:05

## 2023-05-05 RX ADMIN — METOPROLOL TARTRATE 25 MG: 25 TABLET, FILM COATED ORAL at 08:05

## 2023-05-05 RX ADMIN — ALLOPURINOL 300 MG: 100 TABLET ORAL at 08:05

## 2023-05-05 RX ADMIN — TIOTROPIUM BROMIDE INHALATION SPRAY 2 PUFF: 3.12 SPRAY, METERED RESPIRATORY (INHALATION) at 08:05

## 2023-05-05 RX ADMIN — Medication 6 MG: at 01:05

## 2023-05-05 RX ADMIN — MAGNESIUM SULFATE 2 G: 2 INJECTION INTRAVENOUS at 09:05

## 2023-05-05 NOTE — PLAN OF CARE
Problem: Adult Inpatient Plan of Care  Goal: Plan of Care Review  Outcome: Ongoing, Progressing  Goal: Patient-Specific Goal (Individualized)  Outcome: Ongoing, Progressing  Goal: Absence of Hospital-Acquired Illness or Injury  Outcome: Ongoing, Progressing  Goal: Optimal Comfort and Wellbeing  Outcome: Ongoing, Progressing  Goal: Readiness for Transition of Care  Outcome: Ongoing, Progressing     Problem: Bariatric Environmental Safety  Goal: Safety Maintained with Care  Outcome: Ongoing, Progressing     Problem: Fluid and Electrolyte Imbalance (Acute Kidney Injury/Impairment)  Goal: Fluid and Electrolyte Balance  Outcome: Ongoing, Progressing     Problem: Oral Intake Inadequate (Acute Kidney Injury/Impairment)  Goal: Optimal Nutrition Intake  Outcome: Ongoing, Progressing     Problem: Renal Function Impairment (Acute Kidney Injury/Impairment)  Goal: Effective Renal Function  Outcome: Ongoing, Progressing

## 2023-05-05 NOTE — PLAN OF CARE
05/05/23 1255   Final Note   Assessment Type Final Discharge Note   Anticipated Discharge Disposition Home [place]   What phone number can be called within the next 1-3 days to see how you are doing after discharge? [phone]   Hospital Resources/Appts/Education Provided Appointments scheduled and added to AVS   Post-Acute Status   Post-Acute Authorization Other   Other Status No Post-Acute Service Needs   Discharge Delays None known at this time     12:59 PM  CM called and spoke with Asmita from the [facility] and patient f/u appointment is 5/9/23.     The patient is being d/c today with no social service needs identified at this time.     Rafia Ramachandran RN  Case Management  [facility]  [phone]

## 2023-05-05 NOTE — PLAN OF CARE
AVS printed and provided to Patient with opportunity to ask questions.  PIV removed.  Telemetry called to notify of discharge.  Telemetry box removed and returned to [facility].  Patient has personal oxygen device at bedside.  Mg+ 2g IVPB completed.  Bedside Delivery requested, but Patient elected to  medications at [facility] after requesting change to [facility].  Bedside Pharmacy called to coordinate medication pickup.  Meds were out with .  All personal belongings secured to include cell phone, , glasses on.  Patient ambulated from room to parking garage.  Patient will drive self home by his report.

## 2023-05-05 NOTE — PROGRESS NOTES
PHARMACY CONSULT NOTE: WARFARIN  Yong Mcintyre is a 47 y.o. male on warfarin therapy for Atrial Fibrillation. PharmD has been consulted for warfarin dosing.    Current order: 5 mg daily   Home dose: 10 mg every Sun, Thu; 5 mg all other days  Coumadin clinic enrollment: Active  INR goal: 2-3    Lab Results   Component Value Date    INR 2.3 (H) 05/05/2023    INR 2.8 (H) 05/04/2023    INR 4.2 (H) 05/03/2023     Diet:  Adult Cardiac      Recommendation(s):   Continue warfarin 5 mg PO daily  INR daily (ordered)  Pharmacy will continue to follow and monitor warfarin    Thank you for the consult,  Jacqueline Conroy, PharmD, BCPS  [id number]     **Note: Consults are reviewed Monday-Friday 7:00am-3:30pm. THE ABOVE RECOMMENDATIONS ARE ONLY SUGGESTED.THE RECOMMENDATIONS SHOULD BE CONSIDERED IN CONJUNCTION WITH ALL PATIENT FACTORS. **

## 2023-05-05 NOTE — PLAN OF CARE
[facility] ([facility]  Discharge Final Note    Primary Care Provider: Gianni Escalona MD    Expected Discharge Date: 5/5/2023    Final Discharge Note (most recent)       Final Note - 05/05/23 1255          Final Note    Assessment Type Final Discharge Note     Anticipated Discharge Disposition Home or Self Care with Planned Readmission     What phone number can be called within the next 1-3 days to see how you are doing after discharge? [phone]     Hospital Resources/Appts/Education Provided Appointments scheduled and added to AVS        Post-Acute Status    Post-Acute Authorization Other     Other Status No Post-Acute Service Needs     Discharge Delays None known at this time                    The patient is being d/c today with no social service needs identified at this time.  Patient is on home oxygen and denies any HME.      Future Appointments   Date Time Provider Department Center   5/9/2023  8:30 AM LAB, APPOINTMENT [facility] VNP [facility]   5/25/2023  3:00 PM Venecia Anderson DNP [facility]   6/1/2023  3:30 PM Gianni Escalona MD [facility]   8/2/2023  2:40 PM Elba Maloney NP [facility] SLEEP [facility]     Rafia Ramachandran RN  Case Management  [facility]  [phone]

## 2023-05-08 ENCOUNTER — PATIENT OUTREACH (OUTPATIENT)
Dept: ADMINISTRATIVE | Facility: CLINIC | Age: 48
End: 2023-05-08
Payer: MEDICARE

## 2023-05-08 NOTE — PROGRESS NOTES
C3 nurse spoke with Yong Mcintyre for a TCC post hospital discharge follow up call. The patient has a scheduled \[facility] [place] [facility]\""FU appointment with Venecia Anderson on 05/25/2023 @ 6415.

## 2023-05-09 ENCOUNTER — PES CALL (OUTPATIENT)
Dept: ADMINISTRATIVE | Facility: CLINIC | Age: 48
End: 2023-05-09
Payer: MEDICARE

## 2023-05-09 NOTE — DISCHARGE SUMMARY
[facility] ([place] [address])  [facility] Medicine  Discharge Summary      Patient Name: Yong Mcintyre  MRN: [id number]  HUEY: [id number]  Patient Class: IP- Inpatient  Admission Date: 5/2/2023  Hospital Length of Stay: 2 days  Discharge Date and Time: 5/5/23  Attending Physician: Kirti att. providers found   Discharging Provider: Igor Lewis MD  Primary Care Provider: Gianni Escalona MD  [facility] Medicine Team: [facility]OSP MED D Igor Lewis MD  Primary Care Team: [facility] MED D    HPI:   Heart failure with preserved ejection fraction, HTN,  pHTN (Followed by Pulmonology at hospitals, currently on no therapy), chronic hypoxic respiratory failure (3L NC O2 at home), PFO (unsuccessful closure in 2006), AF (on coumadin), Pickwickian syndrome, OHS/MALLIKA, morbid obesity with BMI, , long-term use of anticoagulation who is presents to the ED with worsening shortness of breath and concerns his CO2 level is elevated. Patient states that since he got sick with COVID back in 2/2023 (which lasted one week) he began to feel more fatigued, was unable to go the gym, and began to feel short of breath. He used to work out about 4-5 times  a week prior to this.           Hospital Course:   Mr. Mcintyre was admitted to Hospital Medicine for management of a CHF exacerbation.  He was started on Lasix 80mg IV BID. He diuresed well as below. He noted improvement in his shortness of breath and other symptoms. Discussed importance of bipap and medication compliance. Patient deemed ready for discharge. Plan discussed with pt, who was agreeable and amenable; medications were discussed and reviewed, outpatient follow-up arranged, ER precautions were given, all questions were answered to the pt's satisfaction, and Yong Mcintyre  was subsequently discharged.       Pulmonary  * Acute and chronic respiratory failure with hypercapnia  Patient with Hypercapnic Respiratory failure which is Acute on chronic.  he is on home oxygen  at 3 LPM. Supplemental oxygen was provided and noted-      . Signs/symptoms of respiratory failure include- Tightness, Decreased air entry. Contributing diagnoses includes - CHF Labs and images were reviewed. Patient Has not had a recent ABG. Will treat underlying causes and adjust management of respiratory failure as follows    · diuresis as below  · On home 3L oxygen    Cardiac/Vascular  Acute on chronic heart failure with preserved ejection fraction (HFpEF)  Patient is identified as having Diastolic (HFpEF) heart failure that is Acute on chronic. CHF is currently uncontrolled due to Pulmonary edema/pleural effusion on CXR. Latest ECHO performed and demonstrates- Results for orders placed during the hospital encounter of 03/17/22    Echo    Interpretation Summary  · Technically challenging study.  · The left ventricle is normal in size with normal systolic function. The estimated ejection fraction is 55%.  · The right ventricle is not well visualized.  · Normal left ventricular diastolic function.  · Intermediate central venous pressure (8 mmHg).  . Continue Furosemide and monitor clinical status closely. Monitor on telemetry. Patient is off CHF pathway.  Monitor strict Is&Os and daily weights.  Place on fluid restriction of 1.5 L. Continue to stress to patient importance of self efficacy and  on diet for CHF. Last BNP reviewed- and noted below   Recent Labs   Lab 05/02/23  2205   BNP 31     Results for orders placed during the hospital encounter of 05/02/23    Echo    Interpretation Summary  · The left ventricle is normal in size with mildly decreased systolic function.  · The estimated ejection fraction is 50%.  · There are segmental left ventricular wall motion abnormalities.  · There is abnormal septal wall motion.  · Left ventricular diastolic dysfunction.  · Moderate right ventricular enlargement with mildly reduced right ventricular systolic function.  · Mild right atrial enlargement.  · Normal  central venous pressure (3 mmHg).        Final Active Diagnoses:    Diagnosis Date Noted POA    PRINCIPAL PROBLEM:  Acute and chronic respiratory failure with hypercapnia [J96.22] 02/18/2014 Yes    Acute on chronic heart failure with preserved ejection fraction (HFpEF) [I50.33] 05/03/2023 Yes    Chest pain, pleuritic [R07.81] 05/03/2023 Yes    Hypokalemia [E87.6] 05/03/2023 Yes    Class 2 obesity due to excess calories in adult [E66.09] 05/03/2023 Yes    Hypomagnesemia [E83.42] 05/03/2023 Yes    Acute kidney injury superimposed on CKD [N17.9, N18.9] 12/07/2019 Yes    Paroxysmal atrial fibrillation [I48.0] 04/13/2016 Yes    Pulmonary hypertension [I27.20] 11/05/2012 Yes    Chronic pulmonary heart disease, unspecified [I27.9] 07/02/2012 Yes      Problems Resolved During this Admission:       Discharged Condition: good    Disposition: Home or Self Care    Follow Up:    Patient Instructions:      Ambulatory referral/consult to [facility]   Standing Status: Future   Referral Priority: Routine Referral Type: Consultation   Referral Reason: Specialty Services Required   Number of Visits Requested: 1     Diet Cardiac     Notify your health care provider if you experience any of the following:  temperature >100.4     Notify your health care provider if you experience any of the following:  persistent nausea and vomiting or diarrhea     Notify your health care provider if you experience any of the following:  severe uncontrolled pain     Notify your health care provider if you experience any of the following:  difficulty breathing or increased cough     Notify your health care provider if you experience any of the following:  severe persistent headache     Notify your health care provider if you experience any of the following:  worsening rash     Notify your health care provider if you experience any of the following:  persistent dizziness, light-headedness, or visual disturbances     Notify your  health care provider if you experience any of the following:  increased confusion or weakness     Activity as tolerated         Pending Diagnostic Studies:     None         Medications:  Reconciled Home Medications:      Medication List      CHANGE how you take these medications    metOLazone 2.5 MG tablet  Commonly known as: ZAROXOLYN  TAKE 1 TABLET(2.5 MG) BY MOUTH 3 TIMES A WEEK  What changed: See the new instructions.     warfarin 10 MG tablet  Commonly known as: COUMADIN  TAKE 1 TABLET (10 MG) EVERY SUNDAY AND HALF A TABLET (5 MG) ON ALL OTHER DAYS AS DIRECTED BY COUMADIN CLINIC  What changed: additional instructions        CONTINUE taking these medications    albuterol 90 mcg/actuation inhaler  Commonly known as: VENTOLIN HFA  Inhale 2 puffs into the lungs every 4 (four) hours as needed for Wheezing. Rescue     allopurinoL 300 MG tablet  Commonly known as: ZYLOPRIM  TAKE 1 TABLET(300 MG) BY MOUTH EVERY DAY     b complex vitamins tablet  Take 1 tablet by mouth once daily.     CALCIUM ORAL  Take 1 capsule by mouth once daily.     fish oil-omega-3 fatty acids 300-1,000 mg capsule  Take 1 capsule by mouth once daily.     furosemide 80 MG tablet  Commonly known as: LASIX  TAKE 1 TABLET(80 MG) BY MOUTH TWICE DAILY     metoprolol tartrate 25 MG tablet  Commonly known as: LOPRESSOR  TAKE 1 TABLET BY MOUTH TWICE DAILY     NASACORT ALLERGY NASL  2 sprays by Nasal route once daily.     ONE-A-DAY MEN'S COMPLETE ORAL  Take 1 tablet by mouth once daily.     potassium chloride 10 MEQ Cpsr  Commonly known as: MICRO-K  TAKE 1 CAPSULE(10 MEQ) BY MOUTH EVERY DAY     tiotropium 18 mcg inhalation capsule  Commonly known as: SPIRIVA  Inhale 18 mcg into the lungs once daily.     TYLENOL ARTHRITIS ORAL  Take 2 tablets by mouth daily as needed (pain).     VITAMIN C ORAL  Take 1 tablet by mouth once daily.            Indwelling Lines/Drains at time of discharge:   Lines/Drains/Airways     None                 Time spent on the  discharge of patient: 35 minutes         Igor Lewis MD  Department of Hospital Medicine  [facility] - Intensive Care ([place]-14)

## 2023-05-10 NOTE — PHYSICIAN QUERY
PT Name: Yong Mcintyre  MR #: [id number]     DOCUMENTATION CLARIFICATION     CDS/: Cristina Horowitz RN            Contact information: [email or web address]     This form is a permanent document in the medical record.    Query Date: May 10, 2023    By submitting this query, we are merely seeking further clarification of documentation.  Please utilize your independent clinical judgment when addressing the question(s) below.    The Medical Record contains the following:   Indicator Supporting Clinical Findings Location in Medical Record    Kidney (Renal) Insufficiency       x Kidney (Renal) Failure/Injury  Acute kidney injury superimposed on CKD  - Monitor renal function  - Avoid Nephrotoxic agents  - Monitor urine output    Acute kidney injury superimposed on CKD      Notably, AARON, potentially cardiorenal syndrome, with improvement following initiation of IV diuresis.     Acute kidney injury superimposed on CKD           Resolved   H & P of 5/3           HM PN of 5/3       HM PN of 5/3       HM PN of 5/4     Nephrotoxic Agents       x BUN/Creatinine           GFR  05/02/23 22:05 05/03/23 03:59 05/03/23 19:55 05/04/23 06:02 05/05/23 05:03   BUN 48  46  43  37  37    Creatinine 1.5  1.3 1.8  1.3 1.2   eGFR 57.4 ! >60.0 46.1  >60.0 >60.0    Labs of 5/2 to 5/5                   Urine: Casts         Eosinophils        Dehydration        Nausea/Vomiting        Dialysis/CRRT       x Treatment Furosemide 80 mg IV every 12 hours   Furosemide 80 mg oral 2 times daily  MAR 5/3  MAR 5/4 to 5/5      x Other  06/13/22 13:16   BUN 36    Creatinine 1.3   eGFR if African American >60.0    Prior Encounter Labs of 6/13/22                      [facility] approved diagnostic criteria for acute kidney injury is based on KDIGO criteria:    An increase in serum creatinine > 0.3mg/dl within 48 hours  OR  Increase in serum creatinine to > 1.5x baseline, which is known or presumed to have occurred within the prior 7 days  OR  Urine  volume <0.5 ml/kg/hr for 6 hours       The clinical guidelines noted above are only a system guideline. It does not replace the providers clinical judgment.     Provider, please clarify the renal diagnosis  associated with above clinical findings.     [   ] AARON ( acute kidney injury ) on Chronic Kidney Disease (CKD) stage 2 - Mildly decreased eGFR 60-89      [  x ] AARON ( acute kidney injury ) on Chronic Kidney Disease (CKD) stage 3 unspecified      [   ] Chronic Kidney Disease (CKD) stage 2 - Mildly decreased eGFR 60-89          No AARON ( acute kidney injury )      [   ] Chronic Kidney Disease (CKD) stage 3 unspecified              No  AARON  ( acute kidney injury )      [   ] Other (please specify): _______________________________     [  ] Clinically Undetermined           Please document in your progress notes daily for the duration of treatment until resolved and include in your discharge summary.    References:   KDIGO Clinical Practice Guideline for Acute Kidney Injury. (2012, March). Retrieved October 21, 2020, from https://kdigo.org/wp-content/uploads/2016/10/GKENG-0335-MNW-Guideline-English.pdf    JANET Kim MD, DEVIKA Sánchez MD, & NATALIE Carmen MD. (1960). Renal medullary necrosis [Abstract]. The American Journal of Medicine, 29(1), 132-156. Doi:https://www.sciencedirect.com/science/article/abs/pii/8054552968576368    NATALIE Sierra MD, & GIANNI Solano MD, MS. (2020, June 18). Definition and staging of chronic kidney disease in adults ([id number] DEVIKA Richmond MD, ScD & [id number] CHRIS Austin MD, MSc, Eds.). Retrieved October 21, 2020, from https://www.[employer].Medical Connections/contents/definition-and-staging-of-chronic-kidney-disease-in-adults?search=ckd%20staging&source=search_result&selectedTitle=1~150&usage_type=default&display_rank=1     AJ Fraser MD, FACP. (2015, Yumiko 15). Acute kidney injury revisited. Retrieved October 21, 2020, from  https://acphospitalist.org/archives/2015/06/coding-acute-kidney-injury.htm    DENISSE Mayer MD. (2019, July). Renal Cortical Necrosis. Retrieved October 21, 2020, from https://www.[employer]/professional/genitourinary-disorders/renovascular-disorders/renal-cortical-necrosis    Form No. 89414

## 2023-05-15 ENCOUNTER — OFFICE VISIT (OUTPATIENT)
Dept: HOME HEALTH SERVICES | Facility: CLINIC | Age: 48
End: 2023-05-15
Payer: MEDICARE

## 2023-05-15 DIAGNOSIS — E66.2 PICKWICKIAN SYNDROME: ICD-10-CM

## 2023-05-15 DIAGNOSIS — Z99.81 OXYGEN DEPENDENT: ICD-10-CM

## 2023-05-15 DIAGNOSIS — J96.22 ACUTE AND CHRONIC RESPIRATORY FAILURE WITH HYPERCAPNIA: ICD-10-CM

## 2023-05-15 DIAGNOSIS — I50.32 CHRONIC DIASTOLIC HEART FAILURE: ICD-10-CM

## 2023-05-15 DIAGNOSIS — R07.81 CHEST PAIN, PLEURITIC: ICD-10-CM

## 2023-05-15 DIAGNOSIS — E66.09 CLASS 2 OBESITY DUE TO EXCESS CALORIES IN ADULT, UNSPECIFIED BMI, UNSPECIFIED WHETHER SERIOUS COMORBIDITY PRESENT: ICD-10-CM

## 2023-05-15 DIAGNOSIS — G47.00 INSOMNIA, UNSPECIFIED TYPE: ICD-10-CM

## 2023-05-15 DIAGNOSIS — I27.20 PULMONARY HYPERTENSION: ICD-10-CM

## 2023-05-15 DIAGNOSIS — I10 PRIMARY HYPERTENSION: ICD-10-CM

## 2023-05-15 DIAGNOSIS — I50.33 ACUTE ON CHRONIC HEART FAILURE WITH PRESERVED EJECTION FRACTION (HFPEF): ICD-10-CM

## 2023-05-15 DIAGNOSIS — N17.9 ACUTE KIDNEY INJURY SUPERIMPOSED ON CKD: ICD-10-CM

## 2023-05-15 DIAGNOSIS — N18.9 ACUTE KIDNEY INJURY SUPERIMPOSED ON CKD: ICD-10-CM

## 2023-05-15 DIAGNOSIS — I48.0 PAROXYSMAL ATRIAL FIBRILLATION: ICD-10-CM

## 2023-05-15 PROCEDURE — 3078F PR MOST RECENT DIASTOLIC BLOOD PRESSURE < 80 MM HG: ICD-10-PCS | Mod: CPTII,S$GLB,, | Performed by: NURSE PRACTITIONER

## 2023-05-15 PROCEDURE — 99497 PR ADVNCD CARE PLAN 30 MIN: ICD-10-PCS | Mod: S$GLB,,, | Performed by: NURSE PRACTITIONER

## 2023-05-15 PROCEDURE — 99495 TRANSJ CARE MGMT MOD F2F 14D: CPT | Mod: S$GLB,,, | Performed by: NURSE PRACTITIONER

## 2023-05-15 PROCEDURE — 3074F SYST BP LT 130 MM HG: CPT | Mod: CPTII,S$GLB,, | Performed by: NURSE PRACTITIONER

## 2023-05-15 PROCEDURE — 3078F DIAST BP <80 MM HG: CPT | Mod: CPTII,S$GLB,, | Performed by: NURSE PRACTITIONER

## 2023-05-15 PROCEDURE — 99495 TCM SERVICES (MODERATE COMPLEXITY): ICD-10-PCS | Mod: S$GLB,,, | Performed by: NURSE PRACTITIONER

## 2023-05-15 PROCEDURE — 3074F PR MOST RECENT SYSTOLIC BLOOD PRESSURE < 130 MM HG: ICD-10-PCS | Mod: CPTII,S$GLB,, | Performed by: NURSE PRACTITIONER

## 2023-05-15 PROCEDURE — 99497 ADVNCD CARE PLAN 30 MIN: CPT | Mod: S$GLB,,, | Performed by: NURSE PRACTITIONER

## 2023-05-15 RX ORDER — HYDROXYZINE PAMOATE 25 MG/1
25 CAPSULE ORAL NIGHTLY PRN
Qty: 30 CAPSULE | Refills: 0 | Status: SHIPPED | OUTPATIENT
Start: 2023-05-15 | End: 2023-11-01

## 2023-05-16 ENCOUNTER — LAB VISIT (OUTPATIENT)
Dept: LAB | Facility: HOSPITAL | Age: 48
End: 2023-05-16
Attending: INTERNAL MEDICINE
Payer: MEDICARE

## 2023-05-16 DIAGNOSIS — I27.9 CHRONIC PULMONARY HEART DISEASE: ICD-10-CM

## 2023-05-16 DIAGNOSIS — Z79.01 LONG TERM CURRENT USE OF ANTICOAGULANT THERAPY: ICD-10-CM

## 2023-05-16 LAB
INR PPP: 3 (ref 0.8–1.2)
PROTHROMBIN TIME: 30.3 SEC (ref 9–12.5)

## 2023-05-16 PROCEDURE — 36415 COLL VENOUS BLD VENIPUNCTURE: CPT | Mod: HCNC | Performed by: INTERNAL MEDICINE

## 2023-05-16 PROCEDURE — 85610 PROTHROMBIN TIME: CPT | Mod: HCNC | Performed by: INTERNAL MEDICINE

## 2023-05-17 ENCOUNTER — ANTI-COAG VISIT (OUTPATIENT)
Dept: CARDIOLOGY | Facility: CLINIC | Age: 48
End: 2023-05-17
Payer: MEDICARE

## 2023-05-17 DIAGNOSIS — Z79.01 LONG TERM CURRENT USE OF ANTICOAGULANT THERAPY: ICD-10-CM

## 2023-05-17 DIAGNOSIS — I27.9 CHRONIC PULMONARY HEART DISEASE: Primary | ICD-10-CM

## 2023-05-17 PROCEDURE — 93793 ANTICOAG MGMT PT WARFARIN: CPT | Mod: ,,, | Performed by: PHARMACIST

## 2023-05-17 PROCEDURE — 93793 PR ANTICOAGULANT MGMT FOR PT TAKING WARFARIN: ICD-10-PCS | Mod: ,,, | Performed by: PHARMACIST

## 2023-05-25 ENCOUNTER — PES CALL (OUTPATIENT)
Dept: ADMINISTRATIVE | Facility: CLINIC | Age: 48
End: 2023-05-25
Payer: MEDICARE

## 2023-05-28 VITALS
TEMPERATURE: 98 F | HEART RATE: 79 BPM | OXYGEN SATURATION: 94 % | DIASTOLIC BLOOD PRESSURE: 77 MMHG | RESPIRATION RATE: 20 BRPM | SYSTOLIC BLOOD PRESSURE: 108 MMHG

## 2023-05-28 PROBLEM — G47.00 INSOMNIA: Status: ACTIVE | Noted: 2023-05-28

## 2023-05-28 NOTE — ASSESSMENT & PLAN NOTE
Appears euvolemic  1. Denies chest pain, SOB  2. Continue medication as prescribed  3. Keep scheduled follow up appts  4. Activity and Diet restrictions:   ? Recommend 2-3 gram sodium restriction and 1500cc- 2000cc fluid restriction.  ? Encourage physical activity with graded exercise program.  ? Requested patient to weigh themselves daily, and to notify us if their weight increases by more than 3 lbs in 1 day or 5 lbs in 3 days.  ? Elevated lower extremities while sitting/lying, compression stockings

## 2023-05-28 NOTE — PROGRESS NOTES
"[facility]  Transition of Care Home Visit    Visit Date: 5/15/2023  Encounter Provider: David Isabel NP  PCP:  Gianni Escalona MD    PRESENTING HISTORY      Patient ID: Yong Mcintyre 47 y.o. male.    Consult Requested By:  Dr. Igor Garcia P*  Reason for Consult:  Hospital Follow Up    Chief Complaint: Hospital Follow Up    The patient is being seen at home due to a physical debility that presents a taxing effort to leave the home, to mitigate high risk of hospital readmission or due to the limited availability of reliable or safe options for transportation to the point of access to the provider. The visit meets the criteria for medical necessity as defined by CMS as "health-care services needed to prevent, diagnose, or treat an illness, injury, condition, disease, or its symptoms and that meet accepted standards of medicine." Prior to treatment on this visit the chart was reviewed and patient consent was obtained.    HPI:   Heart failure with preserved ejection fraction, HTN,  pHTN (Followed [facility], currently on no therapy), chronic hypoxic respiratory failure (3L NC O2 at home), PFO (unsuccessful closure in 2006), AF (on coumadin), Pickwickian syndrome, OHS/MALLIKA, morbid obesity with BMI, , long-term use of anticoagulation who is presents to the ED with worsening shortness of breath and concerns his CO2 level is elevated. Patient states that since he got sick with COVID back in 2/2023 (which lasted one week) he began to feel more fatigued, was unable to go the gym, and began to feel short of breath. He used to work out about 4-5 times  a week prior to this.           Hospital Course:   Mr. Mcintyre was admitted to Hospital Medicine for management of a CHF exacerbation.  He was started on Lasix 80mg IV BID. He diuresed well as below. He noted improvement in his shortness of breath and other symptoms. Discussed importance of bipap and medication compliance. Patient deemed ready for " discharge. Plan discussed with pt, who was agreeable and amenable; medications were discussed and reviewed, outpatient follow-up arranged, ER precautions were given, all questions were answered to the pt's satisfaction, and Yong Mcintyre  was subsequently discharged.       Today:  Mr. Yong Mcintyre is a 47 y.o. male is being seen and examined at home today for transitional care visit to the home environment post-discharge from inpatient hospitalization encounter described above. Yong presents at baseline state of health as reported by patient. VSS. Denies any acute issues, concerns or complaints to address on today's visit. Denies chest pain, SOB, fevers, cough, congestion, change in bladder habits, change in bowel habits. Reports good appetite and bm pattern.  Reports insomnia, requesting medication management. Reports taking all medications as prescribed. He has follow up scheduled with PCP at [facility] and [facility]ology at [facility] and plans on attending. No other needs identified at this time. Risks of environmental exposure to coronavirus discussed including: social distancing, hand hygiene, and limiting departures from the home for necessities only.  Reports understanding and willingness to comply.     Attestation: Screening criteria to assess the level of the patient's risk for infection with COVID-19 as recommended by the CDC at the time of the above documented home visit concluded appropriateness to proceed. Universal precautions were maintained at all times, including provider use of >60% alcohol gel hand  immediately prior to entry and upon departing the patient's home as well as cleaning of equipment used in home visit with antibacterial/germicidal disposable wipes.    Admission Date: 5/2/2023  Hospital Length of Stay: 2 days  Discharge Date and Time: 5/5/23  _________________________________________________________________    Review of Systems   Constitutional:  Negative for chills and fever.    HENT:  Negative for congestion, postnasal drip and rhinorrhea.    Eyes:  Negative for visual disturbance.   Respiratory:  Negative for chest tightness and shortness of breath.    Cardiovascular:  Negative for chest pain and leg swelling.   Gastrointestinal:  Negative for abdominal pain, diarrhea, nausea and vomiting.   Genitourinary:  Negative for difficulty urinating.   Musculoskeletal:  Negative for arthralgias and myalgias.   Skin:  Negative for color change.   Neurological:  Negative for dizziness, weakness, light-headedness and headaches.   Hematological:  Does not bruise/bleed easily.   Psychiatric/Behavioral:  Negative for agitation.      Assessments:  Environmental: single story home, no steps to enter, adequate lighting and temperature control  Functional Status: Independent with ADL's/IADL's, ambulates with assistance of a cane/walker, continent of bowel and bladder  Safety: Fall Precautions, COVID Precautions/Social Distancing/Mask Use  Nutritional: Adequate  Home Health: none  DME/Supplies: oxygen    Ethical / Legal: Advance Care Planning   Capacity to make medical decisions:  yes, Conflict no  Surrogate decision maker:  Name Alphonse, Relationship: father  Advance Directives:  no  HCPOA: no  LaPOST:  no  Code Status:  full code    Advance Care Planning/Goals of Care:  Advanced Care Directives, HCPOA and LaPost forms left in the home for family review, discussion and signing with instructions to return upon their next provider encounter for inclusion to the medical record. Discussed Advance Care Planning for 20 minutes.    PAST HISTORY:     Past Medical History:   Diagnosis Date    Arthritis     CHF (congestive heart failure)     Cor pulmonale 11/5/2012    Diastolic dysfunction     Gallstones     Hypertension     Left ventricular systolic dysfunction 11/5/2012    Morbid obesity 11/5/2012    Obesity     MALLIKA (obstructive sleep apnea)     PFO (patent foramen ovale) 11/5/2012    Pickwickian syndrome  11/5/2012    Pulmonary hypertension     Respiratory failure, acute-on-chronic 3/7/2014       Past Surgical History:   Procedure Laterality Date    RIGHT HEART CATHETERIZATION Right 3/22/2022    Procedure: INSERTION, CATHETER, RIGHT HEART;  Surgeon: Tony Martínez MD;  [facility] CATH LAB;  Service: Cardiology;  Laterality: Right;       Family History   Problem Relation Age of Onset    Arthritis Mother     Asthma Mother     Hypertension Father     Asthma Sister     Arthritis Maternal Grandmother     Asthma Maternal Grandmother     Diabetes Maternal Grandmother     Hypertension Maternal Grandmother     Arthritis Maternal Grandfather     Hypertension Maternal Grandfather     Hypertension Paternal Grandfather     Breast cancer Paternal Grandmother     Down syndrome Brother     Gout Brother        Social History     Socioeconomic History    Marital status: Single   Tobacco Use    Smoking status: Never    Smokeless tobacco: Never   Substance and Sexual Activity    Alcohol use: Yes     Comment: holidays  rare    Drug use: No    Sexual activity: Not Currently     Partners: Female     Social Determinants of Health     Financial Resource Strain: Low Risk     Difficulty of Paying Living Expenses: Not hard at all   Food Insecurity: No Food Insecurity    Worried About Running Out of Food in the Last Year: Never true    Ran Out of Food in the Last Year: Never true   Transportation Needs: No Transportation Needs    Lack of Transportation (Medical): No    Lack of Transportation (Non-Medical): No   Physical Activity: Sufficiently Active    Days of Exercise per Week: 5 days    Minutes of Exercise per Session: 70 min   Stress: No Stress Concern Present    Feeling of Stress : Only a little   Social Connections: Socially Isolated    Frequency of Communication with Friends and Family: More than three times a week    Frequency of Social Gatherings with Friends and Family: Once a week    Attends Jain Services: Never    Active  Member of Clubs or Organizations: No    Attends Club or Organization Meetings: Never    Marital Status: Never    Housing Stability: Low Risk     Unable to Pay for Housing in the Last Year: No    Number of Places Lived in the Last Year: 1    Unstable Housing in the Last Year: No       MEDICATIONS & ALLERGIES:     Current Outpatient Medications on File Prior to Visit   Medication Sig Dispense Refill    acetaminophen (TYLENOL ARTHRITIS ORAL) Take 2 tablets by mouth daily as needed (pain).      albuterol (VENTOLIN HFA) 90 mcg/actuation inhaler Inhale 2 puffs into the lungs every 4 (four) hours as needed for Wheezing. Rescue 18 g 2    allopurinoL (ZYLOPRIM) 300 MG tablet TAKE 1 TABLET(300 MG) BY MOUTH EVERY DAY 90 tablet 3    ascorbic acid (VITAMIN C ORAL) Take 1 tablet by mouth once daily.      b complex vitamins tablet Take 1 tablet by mouth once daily.      CALCIUM ORAL Take 1 capsule by mouth once daily.      furosemide (LASIX) 80 MG tablet TAKE 1 TABLET(80 MG) BY MOUTH TWICE DAILY 180 tablet 3    metOLazone (ZAROXOLYN) 2.5 MG tablet TAKE 1 TABLET(2.5 MG) BY MOUTH 3 TIMES A WEEK (Patient taking differently: Take 2.5 mg by mouth twice a week. Wed & Sat) 30 tablet prn    metoprolol tartrate (LOPRESSOR) 25 MG tablet TAKE 1 TABLET BY MOUTH TWICE DAILY 180 tablet 3    multivit,calc,min/FA/K1/lycop (ONE-A-DAY MEN'S COMPLETE ORAL) Take 1 tablet by mouth once daily.      omega-3 fatty acids/fish oil (FISH OIL-OMEGA-3 FATTY ACIDS) 300-1,000 mg capsule Take 1 capsule by mouth once daily.      potassium chloride (MICRO-K) 10 MEQ CpSR TAKE 1 CAPSULE(10 MEQ) BY MOUTH EVERY DAY 30 capsule 5    tiotropium (SPIRIVA) 18 mcg inhalation capsule Inhale 18 mcg into the lungs once daily.      triamcinolone acetonide (NASACORT ALLERGY NASL) 2 sprays by Nasal route once daily.      warfarin (COUMADIN) 10 MG tablet TAKE 1 TABLET (10 MG) EVERY SUNDAY AND HALF A TABLET (5 MG) ON ALL OTHER DAYS AS DIRECTED BY COUMADIN CLINIC 30 tablet 1  "   [DISCONTINUED] sildenafil (REVATIO) 20 mg Tab Take 1 tablet (20 mg total) by mouth 3 (three) times daily. 270 tablet prn     No current facility-administered medications on file prior to visit.        Review of patient's allergies indicates:   Allergen Reactions    Lipitor [atorvastatin] Other (See Comments)     "it makes my legs feel like jelly"  Other reaction(s): causes legs to hurt       OBJECTIVE:     Vital Signs:  Vitals:    05/15/23 1400   BP: 108/77   Pulse: 79   Resp: 20   Temp: 98 °F (36.7 °C)     There is no height or weight on file to calculate BMI.       Physical Exam  HENT:      Head: Normocephalic and atraumatic.      Nose: No congestion or rhinorrhea.      Mouth/Throat:      Mouth: Mucous membranes are moist.   Cardiovascular:      Rate and Rhythm: Normal rate.   Pulmonary:      Effort: No respiratory distress.      Comments: Oxygen 3L NC intact  Abdominal:      General: Bowel sounds are normal.      Palpations: Abdomen is soft.   Musculoskeletal:      Cervical back: Normal range of motion and neck supple.      Right lower leg: No edema.      Left lower leg: No edema.   Skin:     General: Skin is warm and dry.   Neurological:      Mental Status: He is alert and oriented to person, place, and time.   Psychiatric:         Mood and Affect: Mood normal.     Laboratory  Lab Results   Component Value Date    WBC 7.90 05/03/2023    HGB 16.4 05/03/2023    HCT 55.4 (H) 05/03/2023    MCV 94 05/03/2023     05/03/2023     Lab Results   Component Value Date    INR 3.0 (H) 05/16/2023    INR 2.3 (H) 05/05/2023    INR 2.8 (H) 05/04/2023     Lab Results   Component Value Date    HGBA1C 5.2 03/18/2022     No results for input(s): POCTGLUCOSE in the last 72 hours.    TRANSITION OF CARE:     [facility] Contact Note: 5/8/2023    Family and/or Caretaker present at visit?  No.  Diagnostic tests reviewed/disposition: No diagnosic tests pending after this hospitalization.  Disease/illness education: Importance " of Compliance with all prescribed medications and treatments, COVID Precautions/Social Distancing/Mask Use  Home health/community services discussion/referrals: Patient does not have home health established from hospital visit.  They do not need home health.  If needed, we will set up home health for the patient.   Establishment or re-establishment of referral orders for community resources: No other necessary community resources.   Discussion with other health care providers: No discussion with other health care providers necessary.     Transition of Care Visit:  I have reviewed and updated the history and problem list. I have reconciled the medication list. I have discussed the hospitalization and current medical issues, prognosis and plans with the patient/family.     Medications Reconciliation:   I have reconciled the patient's home medications and discharge medications with the patient/family. I have updated all changes. Refer to After-Visit Medication List.    Discharge plans, follow-up instructions, future appointments, provider contact information, indicators to seek emergency treatment and encouragement to call for any questions, concerns or clarification of the patient's plan of care explained to patient and/or caregiver(s), whom confirm understanding of provided information and endorse willingness to comply.     ASSESSMENT & PLAN:     HIGH RISK CONDITION(S):  Patient has a condition that poses threat to life and bodily function: s/p recent hospitalization for CHF exacerbation.      1. Chronic diastolic heart failure  -     Ambulatory referral/consult to [facility]    2. Oxygen dependent  Assessment & Plan:  No acute issues  Continue oxygen as prescribed     Orders:  -     [facility]    3. Pickwickian syndrome  -     Ambulatory referral/consult to [facility]    4. Insomnia, unspecified type  Assessment & Plan:  Ordered vistaril  prn       5. Class 2 obesity due to excess calories in adult, unspecified BMI, unspecified whether serious comorbidity present  Assessment & Plan:  He has been educated on the negative effects of obesity to one's health and encouraged to consider lifestyle diet modifications and exercise.       6. Chest pain, pleuritic  Assessment & Plan:  Denies chest pain      7. Acute on chronic heart failure with preserved ejection fraction (HFpEF)  Assessment & Plan:  Appears euvolemic  Denies chest pain, SOB  Continue medication as prescribed  Keep scheduled follow up appts  Activity and Diet restrictions:   Recommend 2-3 gram sodium restriction and 1500cc- 2000cc fluid restriction.  Encourage physical activity with graded exercise program.  Requested patient to weigh themselves daily, and to notify us if their weight increases by more than 3 lbs in 1 day or 5 lbs in 3 days.  Elevated lower extremities while sitting/lying, compression stockings         8. Acute kidney injury superimposed on CKD  Assessment & Plan:  Reports voiding well  Continue to follow with PCP for lab monitoring       9. Paroxysmal atrial fibrillation  Assessment & Plan:  No acute issues  Continue warfarin and BB      10. Acute and chronic respiratory failure with hypercapnia  Assessment & Plan:  No acute issues  CHF management   Continue oxygen and bipap      11. Primary hypertension  Assessment & Plan:  normotensive      12. Pulmonary hypertension  Assessment & Plan:  No acute issues  Continue oxygen supplement, lasix and metolazone as prescribed       Other orders  -     hydrOXYzine pamoate (VISTARIL) 25 MG Cap; Take 1 capsule (25 mg total) by mouth nightly as needed.  Dispense: 30 capsule; Refill: 0             Encounter for Support and Coordination of Transition of Care    Instructions:  - Continue all medications, treatments and therapies as ordered.   - Follow all instructions, recommendations as discussed.  - Maintain Safety Precautions at all  times.  - Attend all medical appointments as scheduled.  - For worsening symptoms: call Primary Care Physician or Nurse Practitioner.  - For emergencies, call 911 or immediately report to the nearest emergency room.  - Limit Risks of environmental exposure to coronavirus/COVID-19 as discussed including: social distancing, hand hygiene, and limiting departures from the home for necessities only.          Were controlled substances prescribed?  No      Scheduled Follow-up :  Future Appointments   Date Time Provider Department Center   5/30/2023 12:30 PM LAB, APPOINTMENT [facility] LAB V[facility]   6/1/2023  3:30 PM Gianni Escalona MD [facility] PRICARE [place]   8/2/2023  2:40 PM Elba Maloney NP [facility] SLEEP [facility]       After Visit Medication List :     Medication List            Accurate as of May 15, 2023 11:59 PM. If you have any questions, ask your nurse or doctor.                START taking these medications      hydrOXYzine pamoate 25 MG Cap  Commonly known as: VISTARIL  Take 1 capsule (25 mg total) by mouth nightly as needed.  Started by: Sanaz Isabel NP            CHANGE how you take these medications      metOLazone 2.5 MG tablet  Commonly known as: ZAROXOLYN  TAKE 1 TABLET(2.5 MG) BY MOUTH 3 TIMES A WEEK  What changed: See the new instructions.            CONTINUE taking these medications      albuterol 90 mcg/actuation inhaler  Commonly known as: VENTOLIN HFA  Inhale 2 puffs into the lungs every 4 (four) hours as needed for Wheezing. Rescue     allopurinoL 300 MG tablet  Commonly known as: ZYLOPRIM  TAKE 1 TABLET(300 MG) BY MOUTH EVERY DAY     b complex vitamins tablet     CALCIUM ORAL     fish oil-omega-3 fatty acids 300-1,000 mg capsule     furosemide 80 MG tablet  Commonly known as: LASIX  TAKE 1 TABLET(80 MG) BY MOUTH TWICE DAILY     metoprolol tartrate 25 MG tablet  Commonly known as: LOPRESSOR  TAKE 1 TABLET BY MOUTH TWICE DAILY     NASACORT ALLERGY NASL     ONE-A-DAY MEN'S  COMPLETE ORAL     potassium chloride 10 MEQ Cpsr  Commonly known as: MICRO-K  TAKE 1 CAPSULE(10 MEQ) BY MOUTH EVERY DAY     tiotropium 18 mcg inhalation capsule  Commonly known as: SPIRIVA     TYLENOL ARTHRITIS ORAL     VITAMIN C ORAL     warfarin 10 MG tablet  Commonly known as: COUMADIN  TAKE 1 TABLET (10 MG) EVERY SUNDAY AND HALF A TABLET (5 MG) ON ALL OTHER DAYS AS DIRECTED BY COUMADIN CLINIC               Where to Get Your Medications        These medications were sent to [facility] #[id number] - [place], [place] - [address] AT [facility] & [address], [place] [address]      Phone: [phone]   hydrOXYzine pamoate 25 MG Cap         Patient consent was obtained prior to treatment on this visit.    Attestation: Screening criteria to assess the level of the patient's risk for infection with COVID-19 as recommended by the CDC at the time of the above documented home visit concluded appropriateness to proceed. Universal precautions were maintained at all times, including provider use of >60% alcohol gel hand  immediately prior to entry and upon departing the patient's home as well as cleaning of equipment used in home visit with antibacterial/germicidal disposable wipes.     Signature:       MEKHI ReedP-C   [facility]    Total Face-to-Face Encounter: 60 minutes with >50% of time spent discussing the care with the patient/family.

## 2023-05-30 ENCOUNTER — LAB VISIT (OUTPATIENT)
Dept: LAB | Facility: HOSPITAL | Age: 48
End: 2023-05-30
Attending: INTERNAL MEDICINE
Payer: MEDICARE

## 2023-05-30 DIAGNOSIS — Z79.01 LONG TERM CURRENT USE OF ANTICOAGULANT THERAPY: ICD-10-CM

## 2023-05-30 DIAGNOSIS — I27.9 CHRONIC PULMONARY HEART DISEASE: ICD-10-CM

## 2023-05-30 LAB
INR PPP: 4.3 (ref 0.8–1.2)
PROTHROMBIN TIME: 41.8 SEC (ref 9–12.5)

## 2023-05-30 PROCEDURE — 36415 COLL VENOUS BLD VENIPUNCTURE: CPT | Mod: HCNC | Performed by: INTERNAL MEDICINE

## 2023-05-30 PROCEDURE — 85610 PROTHROMBIN TIME: CPT | Mod: HCNC | Performed by: INTERNAL MEDICINE

## 2023-05-31 ENCOUNTER — ANTI-COAG VISIT (OUTPATIENT)
Dept: CARDIOLOGY | Facility: CLINIC | Age: 48
End: 2023-05-31
Payer: MEDICARE

## 2023-05-31 DIAGNOSIS — I27.9 CHRONIC PULMONARY HEART DISEASE: Primary | ICD-10-CM

## 2023-05-31 DIAGNOSIS — Z79.01 LONG TERM CURRENT USE OF ANTICOAGULANT THERAPY: ICD-10-CM

## 2023-05-31 PROCEDURE — 93793 PR ANTICOAGULANT MGMT FOR PT TAKING WARFARIN: ICD-10-PCS | Mod: S$GLB,,, | Performed by: PHARMACIST

## 2023-05-31 PROCEDURE — 93793 ANTICOAG MGMT PT WARFARIN: CPT | Mod: S$GLB,,, | Performed by: PHARMACIST

## 2023-05-31 NOTE — PROGRESS NOTES
Patient states he has been taking warfarin 10mg on Saturday and Thursday and 5mg all other days. He has been taking hydroxyzine prn for sleep since ~5/16. He had 1 tequila drink on 5/20 but does not usually drink alcohol. He denies any other changes. Denies bleeding

## 2023-05-31 NOTE — PROGRESS NOTES
MEDICAL HISTORY:  Pulmonary hypertension.  Hypoventilation syndrome associated with chronic respiratory failure of hypercapnia and hypoxemia.  He is on 3 liters O2.  Heart failure preserved ejection fraction  Obstructive sleep apnea with the use of CPAP.  Gout.  Patent foramen ovale., status post closure  Cholecystectomy.     SOCIAL HISTORY:  Tobacco use and alcohol use - none.     Family history  Per epic     MEDICATIONS:  Allopurinol 300 mg  Advair Diskus 250/50 one puff twice a day  Tracleer 125 mg twice a day.  Lasix 80 mg  twice a day.  40 mg 2 tablets b.i.d.  Metoprolol 25 mg one twice a day.  Micro-K 10 mEq  Coumadin.  Metolazone 2.5 mg  Wednesday, Friday  Spiriva 1 puff daily      A 47-year-old male  Here with his   Presents for routine follow-up visit    Who was in the hospital May 2nd May 5th way presented with shortness a breath feeling uncomfortable and bloated in the abdomen was not sleeping well.  He was noted to be in acute hypercapnic respiratory failure.  He was given IV Lasix and treated with breathing treatments and responded well it appears that the only change in medication that is new from before his metolazone twice a week instead of 3 times a week    Main concern is difficulty going to sleep or staying asleep.  He is tried hydroxyzine as well as Benadryl was only gives limited relief.  He is using CPAP when he is sleeping    Presently no chest pain or palpitations.  He has less dyspnea on exertion.  No shortness a breath at rest  Reports no abdominal pain, regular bowel function, no difficulty urinating, nocturia x1, no indigestion heartburn, no headaches, no arthralgias  He is trying to get back to going to the gym    Examination  Weight 145 lb   BMI 42   Pulse 96   Blood pressure by me 90/62  HEENT exam no abnormal findings  Neck no thyromegaly no masses  Chest clear breath sounds  Heart regular rate rhythm, no murmurs  Abdominal exam nontender, soft, no hepatosplenomegaly  abdominal masses  Pulses 2+ carotid pulses, no bruits, 2+ dorsalis pedal pulses  Extremities trace edema   Diffuse varicose veins  He is on 3 L O2    Impression   General exam  Chronic respiratory failure  Heart failure with preserved ejection fraction  Pulmonary hypertension   Obesity hypoventilation syndrome   Obstructive sleep apnea uses CPAP  Gout    Insomnia    Plan   Labs of chemistry lipid CBC TSH  Trial trazodone 50 mg at bedtime for the next week he will let me know high response

## 2023-06-01 ENCOUNTER — OFFICE VISIT (OUTPATIENT)
Dept: INTERNAL MEDICINE | Facility: CLINIC | Age: 48
End: 2023-06-01
Payer: MEDICARE

## 2023-06-01 VITALS
HEART RATE: 97 BPM | WEIGHT: 245.38 LBS | SYSTOLIC BLOOD PRESSURE: 112 MMHG | HEIGHT: 64 IN | OXYGEN SATURATION: 91 % | BODY MASS INDEX: 41.89 KG/M2 | DIASTOLIC BLOOD PRESSURE: 70 MMHG

## 2023-06-01 DIAGNOSIS — J96.11 CHRONIC RESPIRATORY FAILURE WITH HYPOXIA AND HYPERCAPNIA: ICD-10-CM

## 2023-06-01 DIAGNOSIS — G47.33 OSA ON CPAP: ICD-10-CM

## 2023-06-01 DIAGNOSIS — I50.32 CHRONIC HEART FAILURE WITH PRESERVED EJECTION FRACTION: ICD-10-CM

## 2023-06-01 DIAGNOSIS — J96.12 CHRONIC RESPIRATORY FAILURE WITH HYPOXIA AND HYPERCAPNIA: ICD-10-CM

## 2023-06-01 DIAGNOSIS — M10.9 GOUT, UNSPECIFIED CAUSE, UNSPECIFIED CHRONICITY, UNSPECIFIED SITE: ICD-10-CM

## 2023-06-01 DIAGNOSIS — R06.89 HYPOVENTILATION SYNDROME: ICD-10-CM

## 2023-06-01 DIAGNOSIS — I27.20 PULMONARY HYPERTENSION: ICD-10-CM

## 2023-06-01 DIAGNOSIS — Z00.00 ANNUAL PHYSICAL EXAM: Primary | ICD-10-CM

## 2023-06-01 DIAGNOSIS — E66.01 CLASS 3 SEVERE OBESITY WITH SERIOUS COMORBIDITY AND BODY MASS INDEX (BMI) OF 40.0 TO 44.9 IN ADULT, UNSPECIFIED OBESITY TYPE: ICD-10-CM

## 2023-06-01 DIAGNOSIS — E66.2 PICKWICKIAN SYNDROME: ICD-10-CM

## 2023-06-01 PROCEDURE — 1160F RVW MEDS BY RX/DR IN RCRD: CPT | Mod: HCNC,CPTII,S$GLB, | Performed by: INTERNAL MEDICINE

## 2023-06-01 PROCEDURE — 1160F PR REVIEW ALL MEDS BY PRESCRIBER/CLIN PHARMACIST DOCUMENTED: ICD-10-PCS | Mod: HCNC,CPTII,S$GLB, | Performed by: INTERNAL MEDICINE

## 2023-06-01 PROCEDURE — 3074F SYST BP LT 130 MM HG: CPT | Mod: HCNC,CPTII,S$GLB, | Performed by: INTERNAL MEDICINE

## 2023-06-01 PROCEDURE — 3078F DIAST BP <80 MM HG: CPT | Mod: HCNC,CPTII,S$GLB, | Performed by: INTERNAL MEDICINE

## 2023-06-01 PROCEDURE — 1159F MED LIST DOCD IN RCRD: CPT | Mod: HCNC,CPTII,S$GLB, | Performed by: INTERNAL MEDICINE

## 2023-06-01 PROCEDURE — 99999 PR PBB SHADOW E&M-EST. PATIENT-LVL IV: CPT | Mod: PBBFAC,HCNC,, | Performed by: INTERNAL MEDICINE

## 2023-06-01 PROCEDURE — 1111F PR DISCHARGE MEDS RECONCILED W/ CURRENT OUTPATIENT MED LIST: ICD-10-PCS | Mod: HCNC,CPTII,S$GLB, | Performed by: INTERNAL MEDICINE

## 2023-06-01 PROCEDURE — 3008F BODY MASS INDEX DOCD: CPT | Mod: HCNC,CPTII,S$GLB, | Performed by: INTERNAL MEDICINE

## 2023-06-01 PROCEDURE — 1111F DSCHRG MED/CURRENT MED MERGE: CPT | Mod: HCNC,CPTII,S$GLB, | Performed by: INTERNAL MEDICINE

## 2023-06-01 PROCEDURE — 1159F PR MEDICATION LIST DOCUMENTED IN MEDICAL RECORD: ICD-10-PCS | Mod: HCNC,CPTII,S$GLB, | Performed by: INTERNAL MEDICINE

## 2023-06-01 PROCEDURE — 99999 PR PBB SHADOW E&M-EST. PATIENT-LVL IV: ICD-10-PCS | Mod: PBBFAC,HCNC,, | Performed by: INTERNAL MEDICINE

## 2023-06-01 PROCEDURE — 99396 PREV VISIT EST AGE 40-64: CPT | Mod: HCNC,S$GLB,, | Performed by: INTERNAL MEDICINE

## 2023-06-01 PROCEDURE — 99396 PR PREVENTIVE VISIT,EST,40-64: ICD-10-PCS | Mod: HCNC,S$GLB,, | Performed by: INTERNAL MEDICINE

## 2023-06-01 PROCEDURE — 3078F PR MOST RECENT DIASTOLIC BLOOD PRESSURE < 80 MM HG: ICD-10-PCS | Mod: HCNC,CPTII,S$GLB, | Performed by: INTERNAL MEDICINE

## 2023-06-01 PROCEDURE — 3074F PR MOST RECENT SYSTOLIC BLOOD PRESSURE < 130 MM HG: ICD-10-PCS | Mod: HCNC,CPTII,S$GLB, | Performed by: INTERNAL MEDICINE

## 2023-06-01 PROCEDURE — 3008F PR BODY MASS INDEX (BMI) DOCUMENTED: ICD-10-PCS | Mod: HCNC,CPTII,S$GLB, | Performed by: INTERNAL MEDICINE

## 2023-06-01 RX ORDER — FUROSEMIDE 40 MG/1
80 TABLET ORAL 2 TIMES DAILY
Status: ON HOLD | COMMUNITY
Start: 2023-05-08 | End: 2024-02-02 | Stop reason: HOSPADM

## 2023-06-01 RX ORDER — FLUTICASONE PROPIONATE AND SALMETEROL 250; 50 UG/1; UG/1
1 POWDER RESPIRATORY (INHALATION) 2 TIMES DAILY
COMMUNITY
Start: 2023-05-15

## 2023-06-01 RX ORDER — TRAZODONE HYDROCHLORIDE 50 MG/1
50 TABLET ORAL NIGHTLY
Qty: 10 TABLET | Refills: 0 | Status: SHIPPED | OUTPATIENT
Start: 2023-06-01 | End: 2023-11-01

## 2023-06-06 DIAGNOSIS — I27.0 PRIMARY PULMONARY HYPERTENSION: ICD-10-CM

## 2023-06-06 DIAGNOSIS — I48.91 ATRIAL FIBRILLATION, UNSPECIFIED TYPE: ICD-10-CM

## 2023-06-06 DIAGNOSIS — Q21.12 PFO (PATENT FORAMEN OVALE): ICD-10-CM

## 2023-06-06 DIAGNOSIS — I48.0 PAROXYSMAL ATRIAL FIBRILLATION: ICD-10-CM

## 2023-06-06 DIAGNOSIS — I27.81 COR PULMONALE: ICD-10-CM

## 2023-06-06 DIAGNOSIS — I27.9 CHRONIC PULMONARY HEART DISEASE: ICD-10-CM

## 2023-06-06 DIAGNOSIS — I27.20 PULMONARY HYPERTENSION: ICD-10-CM

## 2023-06-06 DIAGNOSIS — E05.90 HYPERTHYROIDISM: ICD-10-CM

## 2023-06-06 DIAGNOSIS — E66.2 PICKWICKIAN SYNDROME: ICD-10-CM

## 2023-06-07 RX ORDER — TRAZODONE HYDROCHLORIDE 50 MG/1
50 TABLET ORAL NIGHTLY
Qty: 10 TABLET | Refills: 0 | Status: CANCELLED | OUTPATIENT
Start: 2023-06-07 | End: 2023-06-17

## 2023-06-07 RX ORDER — ALLOPURINOL 300 MG/1
300 TABLET ORAL DAILY
Qty: 90 TABLET | Refills: 3 | Status: CANCELLED | OUTPATIENT
Start: 2023-06-07

## 2023-06-07 RX ORDER — TRAZODONE HYDROCHLORIDE 50 MG/1
50 TABLET ORAL NIGHTLY
Qty: 10 TABLET | Refills: 0 | OUTPATIENT
Start: 2023-06-07 | End: 2023-06-17

## 2023-06-07 NOTE — TELEPHONE ENCOUNTER
Refill Routing Note   Medication(s) are not appropriate for processing by [facility] for the following reason(s):      New or recently adjusted medication: FOVS 8/2/23 with [facility] sleep clinic    OR action(s):  Defer Care Due:  None identified          Appointments  past 12m or future 3m with PCP    Date Provider   Last Visit   6/1/2023 Gianni Escalona MD   Next Visit   Visit date not found Gianni Escalona MD   ED visits in past 90 days: 0        Note composed:5:16 PM 06/07/2023

## 2023-06-07 NOTE — TELEPHONE ENCOUNTER
No care due was identified.  [facility] Embedded Care Due Messages. Reference number: [id number].   6/07/2023 5:12:33 PM CDT

## 2023-06-07 NOTE — TELEPHONE ENCOUNTER
No care due was identified.  [facility] Embedded Care Due Messages. Reference number: [id number].   6/06/2023 10:00:49 PM CDT

## 2023-06-07 NOTE — TELEPHONE ENCOUNTER
----- Message from Wilber Ferrer sent at 6/7/2023  4:29 PM CDT -----  Contact: [phone]  /////Requesting an RX refill or new RX.  Is this a refill or new RX: refill  RX name and strength (copy/paste from chart):  traZODone (DESYREL) 50 MG tablet  Is this a 30 day or 90 day RX: 90  Pharmacy name and phone # (copy/paste from chart):    [id number] - [place], [place] - [facility] [facility] AT [facility] & [address] [address]  Phone: [phone] Fax: [phone]      The doctors have asked that we provide their patients with the following 2 reminders -- prescription refills can take up to 72 hours, and a friendly reminder that in the future you can use your [facility] account to request refills:       /////Requesting an RX refill or new RX.  Is this a refill or new RX: refill  RX name and strength (copy/paste from chart):  allopurinoL (ZYLOPRIM) 300 MG tablet  Is this a 30 day or 90 day RX: 90  Pharmacy name and phone # (copy/paste from chart):    [id number] - [place], [place] [id number] [facility] AT [facility] & [address] [place] [address]  Phone: [phone] Fax: [phone]        The doctors have asked that we provide their patients with the following 2 reminders -- prescription refills can take up to 72 hours, and a friendly reminder that in the future you can use your [facility] account to request refills:

## 2023-06-08 ENCOUNTER — LAB VISIT (OUTPATIENT)
Dept: LAB | Facility: HOSPITAL | Age: 48
End: 2023-06-08
Attending: INTERNAL MEDICINE
Payer: MEDICARE

## 2023-06-08 DIAGNOSIS — I27.9 CHRONIC PULMONARY HEART DISEASE: ICD-10-CM

## 2023-06-08 DIAGNOSIS — Z79.01 LONG TERM CURRENT USE OF ANTICOAGULANT THERAPY: ICD-10-CM

## 2023-06-08 LAB
INR PPP: 2.8 (ref 0.8–1.2)
PROTHROMBIN TIME: 28.5 SEC (ref 9–12.5)

## 2023-06-08 PROCEDURE — 36415 COLL VENOUS BLD VENIPUNCTURE: CPT | Mod: HCNC | Performed by: INTERNAL MEDICINE

## 2023-06-08 PROCEDURE — 85610 PROTHROMBIN TIME: CPT | Mod: HCNC | Performed by: INTERNAL MEDICINE

## 2023-06-09 ENCOUNTER — ANTI-COAG VISIT (OUTPATIENT)
Dept: CARDIOLOGY | Facility: CLINIC | Age: 48
End: 2023-06-09
Payer: MEDICARE

## 2023-06-09 DIAGNOSIS — Z79.01 LONG TERM CURRENT USE OF ANTICOAGULANT THERAPY: ICD-10-CM

## 2023-06-09 DIAGNOSIS — I27.9 CHRONIC PULMONARY HEART DISEASE: Primary | ICD-10-CM

## 2023-06-09 PROCEDURE — 93793 ANTICOAG MGMT PT WARFARIN: CPT | Mod: S$GLB,,, | Performed by: PHARMACIST

## 2023-06-09 PROCEDURE — 93793 PR ANTICOAGULANT MGMT FOR PT TAKING WARFARIN: ICD-10-PCS | Mod: S$GLB,,, | Performed by: PHARMACIST

## 2023-06-09 RX ORDER — TRAZODONE HYDROCHLORIDE 50 MG/1
50 TABLET ORAL NIGHTLY
Qty: 10 TABLET | Refills: 0 | Status: CANCELLED | OUTPATIENT
Start: 2023-06-09 | End: 2023-06-19

## 2023-06-09 NOTE — TELEPHONE ENCOUNTER
No care due was identified.  [facility] Embedded Care Due Messages. Reference number: [id number].   6/09/2023 11:47:55 AM CDT

## 2023-06-12 DIAGNOSIS — I27.20 PULMONARY HYPERTENSION: ICD-10-CM

## 2023-06-12 DIAGNOSIS — Q21.12 PFO (PATENT FORAMEN OVALE): ICD-10-CM

## 2023-06-12 DIAGNOSIS — I27.0 PRIMARY PULMONARY HYPERTENSION: ICD-10-CM

## 2023-06-12 DIAGNOSIS — I27.81 COR PULMONALE: ICD-10-CM

## 2023-06-12 DIAGNOSIS — E66.2 PICKWICKIAN SYNDROME: ICD-10-CM

## 2023-06-12 DIAGNOSIS — I48.91 ATRIAL FIBRILLATION, UNSPECIFIED TYPE: ICD-10-CM

## 2023-06-12 DIAGNOSIS — I48.0 PAROXYSMAL ATRIAL FIBRILLATION: ICD-10-CM

## 2023-06-12 DIAGNOSIS — I27.9 CHRONIC PULMONARY HEART DISEASE: ICD-10-CM

## 2023-06-12 DIAGNOSIS — E05.90 HYPERTHYROIDISM: ICD-10-CM

## 2023-06-12 RX ORDER — TRAZODONE HYDROCHLORIDE 50 MG/1
50 TABLET ORAL NIGHTLY
Qty: 10 TABLET | Refills: 0 | Status: CANCELLED | OUTPATIENT
Start: 2023-06-12 | End: 2023-06-22

## 2023-06-12 NOTE — TELEPHONE ENCOUNTER
No care due was identified.  [facility] Embedded Care Due Messages. Reference number: [id number].   6/12/2023 1:35:17 PM CDT

## 2023-06-15 DIAGNOSIS — E66.2 MORBID (SEVERE) OBESITY WITH ALVEOLAR HYPOVENTILATION: ICD-10-CM

## 2023-06-15 DIAGNOSIS — I27.9 CHRONIC PULMONARY HEART DISEASE: Primary | ICD-10-CM

## 2023-06-16 NOTE — PROGRESS NOTES
6/16/23 Patient called to reschedule 6/15 missed lab appointment to 6/19, states will be going out of town 6/20 for 3 weeks returning 7/10, not sure if he can get lab drawn while out of town

## 2023-06-19 ENCOUNTER — LAB VISIT (OUTPATIENT)
Dept: LAB | Facility: HOSPITAL | Age: 48
End: 2023-06-19
Attending: INTERNAL MEDICINE
Payer: MEDICARE

## 2023-06-19 DIAGNOSIS — I27.9 CHRONIC PULMONARY HEART DISEASE: ICD-10-CM

## 2023-06-19 DIAGNOSIS — Z79.01 LONG TERM CURRENT USE OF ANTICOAGULANT THERAPY: ICD-10-CM

## 2023-06-19 LAB
INR PPP: 2.8 (ref 0.8–1.2)
PROTHROMBIN TIME: 27.8 SEC (ref 9–12.5)

## 2023-06-19 PROCEDURE — 36415 COLL VENOUS BLD VENIPUNCTURE: CPT | Mod: HCNC | Performed by: INTERNAL MEDICINE

## 2023-06-19 PROCEDURE — 85610 PROTHROMBIN TIME: CPT | Mod: HCNC | Performed by: INTERNAL MEDICINE

## 2023-06-20 ENCOUNTER — ANTI-COAG VISIT (OUTPATIENT)
Dept: CARDIOLOGY | Facility: CLINIC | Age: 48
End: 2023-06-20
Payer: MEDICARE

## 2023-06-20 DIAGNOSIS — I27.9 CHRONIC PULMONARY HEART DISEASE: Primary | ICD-10-CM

## 2023-06-20 DIAGNOSIS — Z79.01 LONG TERM CURRENT USE OF ANTICOAGULANT THERAPY: ICD-10-CM

## 2023-06-20 PROCEDURE — 93793 PR ANTICOAGULANT MGMT FOR PT TAKING WARFARIN: ICD-10-PCS | Mod: S$GLB,,, | Performed by: PHARMACIST

## 2023-06-20 PROCEDURE — 93793 ANTICOAG MGMT PT WARFARIN: CPT | Mod: S$GLB,,, | Performed by: PHARMACIST

## 2023-06-20 NOTE — PROGRESS NOTES
INR at goal. Medications and chart reviewed. No changes noted to necessitate adjustment of warfarin or follow-up plan. See calendar.  Patient states he will be going out of town 6/20 for 3 weeks returning 7/10, not sure if he can get lab drawn while out of town

## 2023-07-05 ENCOUNTER — PATIENT OUTREACH (OUTPATIENT)
Dept: ADMINISTRATIVE | Facility: HOSPITAL | Age: 48
End: 2023-07-05
Payer: MEDICARE

## 2023-07-05 DIAGNOSIS — Z13.1 SCREENING FOR DIABETES MELLITUS: Primary | ICD-10-CM

## 2023-07-05 NOTE — PROGRESS NOTES
Health Maintenance Due   Topic Date Due    TETANUS VACCINE  Never done    Pneumococcal Vaccines (Age 0-64) (2 - PCV) 10/23/2018    Colorectal Cancer Screening  Never done     Chart review done. HM updated. Immunizations reviewed & updated. Care Everywhere updated.

## 2023-07-07 ENCOUNTER — PATIENT OUTREACH (OUTPATIENT)
Dept: ADMINISTRATIVE | Facility: HOSPITAL | Age: 48
End: 2023-07-07
Payer: MEDICARE

## 2023-07-07 NOTE — PROGRESS NOTES
Health Maintenance Due   Topic Date Due    TETANUS VACCINE  Never done    Pneumococcal Vaccines (Age 0-64) (2 - PCV) 10/23/2018    Colorectal Cancer Screening  Never done     Chart review done. HM updated. Immunizations reviewed & updated. Care Everywhere updated.      
fell 2 weeks ago off 8 foot ladder, since has had worsening lower back pain w/ numbness and pain going down right leg

## 2023-08-07 PROBLEM — N18.9 ACUTE KIDNEY INJURY SUPERIMPOSED ON CKD: Status: RESOLVED | Noted: 2019-12-07 | Resolved: 2023-08-07

## 2023-08-07 PROBLEM — N17.9 ACUTE KIDNEY INJURY SUPERIMPOSED ON CKD: Status: RESOLVED | Noted: 2019-12-07 | Resolved: 2023-08-07

## 2023-08-25 NOTE — PROGRESS NOTES
Received note that pt called cardiology service that he is having tooth pulled and needs clearance. Pt is overdue for INR. Patient does not need to hold for tooth extraction, however we need INR ASAP to make sure INR at goal. We actually have dentist notes available in epic. Please contact pt if procedure date is set and to get INR scheduled asap.     Update: Noted an appt was made with cardiology for 'eliquis' clearance. However, pt does not need a visit for clearance for dental extraction on warfarin. I reached out to cardiology who will cancel appt and let pt know. We just need the INR. Again, depending on INR, he likely will not need to hold for 1 tooth extraction.

## 2023-08-25 NOTE — PROGRESS NOTES
Spoke to patient regarding upcoming dental procedure. Per patient, the date for his procedure will be set after he sees his cardiologist on 8/28. INR scheduled for 8/28/23, at [facility].

## 2023-08-28 ENCOUNTER — PATIENT MESSAGE (OUTPATIENT)
Dept: CARDIOLOGY | Facility: CLINIC | Age: 48
End: 2023-08-28

## 2023-08-28 ENCOUNTER — LAB VISIT (OUTPATIENT)
Dept: LAB | Facility: HOSPITAL | Age: 48
End: 2023-08-28
Attending: INTERNAL MEDICINE
Payer: MEDICARE

## 2023-08-28 ENCOUNTER — ANTI-COAG VISIT (OUTPATIENT)
Dept: CARDIOLOGY | Facility: CLINIC | Age: 48
End: 2023-08-28
Payer: MEDICARE

## 2023-08-28 DIAGNOSIS — Z79.01 LONG TERM CURRENT USE OF ANTICOAGULANT THERAPY: ICD-10-CM

## 2023-08-28 DIAGNOSIS — I27.9 CHRONIC PULMONARY HEART DISEASE: Primary | ICD-10-CM

## 2023-08-28 DIAGNOSIS — I27.9 CHRONIC PULMONARY HEART DISEASE: ICD-10-CM

## 2023-08-28 LAB
INR PPP: 6.9 (ref 0.8–1.2)
PROTHROMBIN TIME: 66 SEC (ref 9–12.5)

## 2023-08-28 PROCEDURE — 36415 COLL VENOUS BLD VENIPUNCTURE: CPT | Mod: HCNC | Performed by: INTERNAL MEDICINE

## 2023-08-28 PROCEDURE — 93793 ANTICOAG MGMT PT WARFARIN: CPT | Mod: S$GLB,,, | Performed by: PHARMACIST

## 2023-08-28 PROCEDURE — 93793 PR ANTICOAGULANT MGMT FOR PT TAKING WARFARIN: ICD-10-PCS | Mod: S$GLB,,, | Performed by: PHARMACIST

## 2023-08-28 PROCEDURE — 85610 PROTHROMBIN TIME: CPT | Mod: HCNC | Performed by: INTERNAL MEDICINE

## 2023-08-28 NOTE — PROGRESS NOTES
Jo Ann with [facility]  Hem lab called in a verbal result as: INR -6.94 dated today 8/28/23

## 2023-08-30 ENCOUNTER — LAB VISIT (OUTPATIENT)
Dept: LAB | Facility: HOSPITAL | Age: 48
End: 2023-08-30
Attending: INTERNAL MEDICINE
Payer: MEDICARE

## 2023-08-30 DIAGNOSIS — Z79.01 LONG TERM CURRENT USE OF ANTICOAGULANT THERAPY: ICD-10-CM

## 2023-08-30 DIAGNOSIS — I27.9 CHRONIC PULMONARY HEART DISEASE: ICD-10-CM

## 2023-08-30 LAB
INR PPP: 3.1 (ref 0.8–1.2)
PROTHROMBIN TIME: 30.6 SEC (ref 9–12.5)

## 2023-08-30 PROCEDURE — 85610 PROTHROMBIN TIME: CPT | Mod: HCNC | Performed by: INTERNAL MEDICINE

## 2023-08-30 PROCEDURE — 36415 COLL VENOUS BLD VENIPUNCTURE: CPT | Mod: HCNC | Performed by: INTERNAL MEDICINE

## 2023-08-30 RX ORDER — TRAZODONE HYDROCHLORIDE 50 MG/1
TABLET ORAL
Qty: 56 TABLET | Refills: 0 | OUTPATIENT
Start: 2023-08-30

## 2023-08-30 RX ORDER — ALLOPURINOL 300 MG/1
300 TABLET ORAL DAILY
Qty: 90 TABLET | Refills: 3 | Status: SHIPPED | OUTPATIENT
Start: 2023-08-30

## 2023-08-31 ENCOUNTER — ANTI-COAG VISIT (OUTPATIENT)
Dept: CARDIOLOGY | Facility: CLINIC | Age: 48
End: 2023-08-31
Payer: MEDICARE

## 2023-08-31 DIAGNOSIS — I27.9 CHRONIC PULMONARY HEART DISEASE: Primary | ICD-10-CM

## 2023-08-31 DIAGNOSIS — Z79.01 LONG TERM CURRENT USE OF ANTICOAGULANT THERAPY: ICD-10-CM

## 2023-08-31 NOTE — PROGRESS NOTES
Due to patient's liquid diet, we will restart his warfarin at a lower weekly dose. He reports that his dental procedure is scheduled for 9/12 and is only having one tooth removed and he states he was told by dental office to hold warfarin 3-5 day before the procedure -  phone [phone].    I have placed a call to Dr Ashraf' office to discuss since we do not recommend holding coumadin for one tooth extraction unless the INR is elevated at time of procedure. We can make adjustments to target the lower (thicker) end of his therapeutic INR range prior to procedure on 9/12

## 2023-09-05 ENCOUNTER — LAB VISIT (OUTPATIENT)
Dept: LAB | Facility: HOSPITAL | Age: 48
End: 2023-09-05
Attending: INTERNAL MEDICINE
Payer: MEDICARE

## 2023-09-05 DIAGNOSIS — Z79.01 LONG TERM CURRENT USE OF ANTICOAGULANT THERAPY: ICD-10-CM

## 2023-09-05 DIAGNOSIS — I27.9 CHRONIC PULMONARY HEART DISEASE: ICD-10-CM

## 2023-09-05 LAB
INR PPP: 2 (ref 0.8–1.2)
PROTHROMBIN TIME: 20.2 SEC (ref 9–12.5)

## 2023-09-05 PROCEDURE — 85610 PROTHROMBIN TIME: CPT | Mod: HCNC | Performed by: INTERNAL MEDICINE

## 2023-09-05 PROCEDURE — 36415 COLL VENOUS BLD VENIPUNCTURE: CPT | Mod: HCNC | Performed by: INTERNAL MEDICINE

## 2023-09-06 ENCOUNTER — ANTI-COAG VISIT (OUTPATIENT)
Dept: CARDIOLOGY | Facility: CLINIC | Age: 48
End: 2023-09-06
Payer: MEDICARE

## 2023-09-06 DIAGNOSIS — Z79.01 LONG TERM CURRENT USE OF ANTICOAGULANT THERAPY: ICD-10-CM

## 2023-09-06 DIAGNOSIS — I27.9 CHRONIC PULMONARY HEART DISEASE: Primary | ICD-10-CM

## 2023-09-06 PROCEDURE — 93793 PR ANTICOAGULANT MGMT FOR PT TAKING WARFARIN: ICD-10-PCS | Mod: S$GLB,,, | Performed by: PHARMACIST

## 2023-09-06 PROCEDURE — 93793 ANTICOAG MGMT PT WARFARIN: CPT | Mod: S$GLB,,, | Performed by: PHARMACIST

## 2023-09-06 NOTE — PROGRESS NOTES
[facility]    2023 3:53 PM    Assessment/Plan:    Patient presents today with therapeutic INR.    Assessment of patient findings and chart review: Patient reports cardiology faxed clearance to dentist office and MD noted patient may hold warfarin 3-5 days. We usually do not advise holding for one tooth extraction but since cardiology already sent clearance  (dentist office confirmed this info as well) we will ask he only hold 3 days, not 5 and restart evening of procedure    Recommendation for patient's warfarin regimen:  see calendar    Recommend repeat INR in 1 week  _________________________________________________________________    Yongtres Mcintyre (47 y.o.) is followed by the [facility].    Anticoagulation Summary  As of 2023      INR goal:  2.0-3.0   TTR:  54.2 % (10.8 y)   INR used for dosin.0 (2023)   Warfarin maintenance plan:  2.5 mg (5 mg x 0.5) every Sun, Thu; 5 mg (5 mg x 1) all other days   Weekly warfarin total:  30 mg   Plan last modified:  Pierce Bush, PharmD (2023)   Next INR check:  2023   Target end date:  Indefinite    Indications    Chronic pulmonary heart disease  unspecified [I27.9]  Long term current use of anticoagulant therapy [Z79.01]                 Anticoagulation Episode Summary       INR check location:  Clinic Lab    Preferred lab:  [facility]    Send INR reminders to:  [facility] COUMADIN MONITORING POOL    Comments:  [facility]//Infusion Suite, every other Mon -- ph [phone]// <VENIPUNCTURE ONLY (POC/meter does not work for pt)>          Anticoagulation Care Providers       Provider Role Specialty Phone number    Gianni Escalona MD [facility] Internal Medicine [phone]

## 2023-09-18 ENCOUNTER — ANTI-COAG VISIT (OUTPATIENT)
Dept: CARDIOLOGY | Facility: CLINIC | Age: 48
End: 2023-09-18
Payer: MEDICARE

## 2023-09-18 ENCOUNTER — LAB VISIT (OUTPATIENT)
Dept: LAB | Facility: HOSPITAL | Age: 48
End: 2023-09-18
Attending: INTERNAL MEDICINE
Payer: MEDICARE

## 2023-09-18 DIAGNOSIS — Z79.01 LONG TERM CURRENT USE OF ANTICOAGULANT THERAPY: ICD-10-CM

## 2023-09-18 DIAGNOSIS — I27.9 CHRONIC PULMONARY HEART DISEASE: Primary | ICD-10-CM

## 2023-09-18 DIAGNOSIS — I27.9 CHRONIC PULMONARY HEART DISEASE: ICD-10-CM

## 2023-09-18 LAB
INR PPP: 3.6 (ref 0.8–1.2)
PROTHROMBIN TIME: 35.5 SEC (ref 9–12.5)

## 2023-09-18 PROCEDURE — 93793 PR ANTICOAGULANT MGMT FOR PT TAKING WARFARIN: ICD-10-PCS | Mod: S$GLB,,, | Performed by: PHARMACIST

## 2023-09-18 PROCEDURE — 36415 COLL VENOUS BLD VENIPUNCTURE: CPT | Mod: HCNC | Performed by: INTERNAL MEDICINE

## 2023-09-18 PROCEDURE — 93793 ANTICOAG MGMT PT WARFARIN: CPT | Mod: S$GLB,,, | Performed by: PHARMACIST

## 2023-09-18 PROCEDURE — 85610 PROTHROMBIN TIME: CPT | Mod: HCNC | Performed by: INTERNAL MEDICINE

## 2023-10-02 ENCOUNTER — LAB VISIT (OUTPATIENT)
Dept: LAB | Facility: HOSPITAL | Age: 48
End: 2023-10-02
Attending: INTERNAL MEDICINE
Payer: MEDICARE

## 2023-10-02 DIAGNOSIS — I27.9 CHRONIC PULMONARY HEART DISEASE: ICD-10-CM

## 2023-10-02 DIAGNOSIS — Z79.01 LONG TERM CURRENT USE OF ANTICOAGULANT THERAPY: ICD-10-CM

## 2023-10-02 LAB
INR PPP: 2.4 (ref 0.8–1.2)
PROTHROMBIN TIME: 24 SEC (ref 9–12.5)

## 2023-10-02 PROCEDURE — 85610 PROTHROMBIN TIME: CPT | Mod: HCNC | Performed by: INTERNAL MEDICINE

## 2023-10-02 PROCEDURE — 36415 COLL VENOUS BLD VENIPUNCTURE: CPT | Mod: HCNC | Performed by: INTERNAL MEDICINE

## 2023-10-03 ENCOUNTER — ANTI-COAG VISIT (OUTPATIENT)
Dept: CARDIOLOGY | Facility: CLINIC | Age: 48
End: 2023-10-03
Payer: MEDICARE

## 2023-10-03 DIAGNOSIS — Z79.01 LONG TERM CURRENT USE OF ANTICOAGULANT THERAPY: ICD-10-CM

## 2023-10-03 DIAGNOSIS — I27.9 CHRONIC PULMONARY HEART DISEASE: Primary | ICD-10-CM

## 2023-10-03 PROCEDURE — 93793 ANTICOAG MGMT PT WARFARIN: CPT | Mod: S$GLB,,, | Performed by: PHARMACIST

## 2023-10-03 PROCEDURE — 93793 PR ANTICOAGULANT MGMT FOR PT TAKING WARFARIN: ICD-10-PCS | Mod: S$GLB,,, | Performed by: PHARMACIST

## 2023-10-26 NOTE — PROGRESS NOTES
10/26/23 Patient missed 10/16 lab appointment, requesting to reschedule to tomorrow Friday 10/27 at [facility]

## 2023-10-27 ENCOUNTER — LAB VISIT (OUTPATIENT)
Dept: LAB | Facility: HOSPITAL | Age: 48
End: 2023-10-27
Attending: INTERNAL MEDICINE
Payer: MEDICARE

## 2023-10-27 ENCOUNTER — ANTI-COAG VISIT (OUTPATIENT)
Dept: CARDIOLOGY | Facility: CLINIC | Age: 48
End: 2023-10-27
Payer: MEDICARE

## 2023-10-27 ENCOUNTER — OFFICE VISIT (OUTPATIENT)
Dept: INTERNAL MEDICINE | Facility: CLINIC | Age: 48
End: 2023-10-27
Payer: MEDICARE

## 2023-10-27 VITALS
DIASTOLIC BLOOD PRESSURE: 80 MMHG | WEIGHT: 249.81 LBS | HEART RATE: 101 BPM | SYSTOLIC BLOOD PRESSURE: 120 MMHG | OXYGEN SATURATION: 87 % | HEIGHT: 64 IN | BODY MASS INDEX: 42.65 KG/M2

## 2023-10-27 DIAGNOSIS — I27.9 CHRONIC PULMONARY HEART DISEASE: ICD-10-CM

## 2023-10-27 DIAGNOSIS — Z79.01 LONG TERM CURRENT USE OF ANTICOAGULANT THERAPY: ICD-10-CM

## 2023-10-27 DIAGNOSIS — N18.30 STAGE 3 CHRONIC KIDNEY DISEASE, UNSPECIFIED WHETHER STAGE 3A OR 3B CKD: ICD-10-CM

## 2023-10-27 DIAGNOSIS — I27.9 CHRONIC PULMONARY HEART DISEASE: Primary | ICD-10-CM

## 2023-10-27 DIAGNOSIS — J96.11 CHRONIC RESPIRATORY FAILURE WITH HYPOXIA AND HYPERCAPNIA: ICD-10-CM

## 2023-10-27 DIAGNOSIS — I27.20 PULMONARY HYPERTENSION: ICD-10-CM

## 2023-10-27 DIAGNOSIS — R10.13 DYSPEPSIA: ICD-10-CM

## 2023-10-27 DIAGNOSIS — J96.12 CHRONIC RESPIRATORY FAILURE WITH HYPOXIA AND HYPERCAPNIA: ICD-10-CM

## 2023-10-27 DIAGNOSIS — G47.33 OSA TREATED WITH BIPAP: ICD-10-CM

## 2023-10-27 DIAGNOSIS — D75.1 POLYCYTHEMIA: ICD-10-CM

## 2023-10-27 DIAGNOSIS — J44.89 CHRONIC ASTHMATIC BRONCHITIS: Primary | ICD-10-CM

## 2023-10-27 LAB
INR PPP: 3.2 (ref 0.8–1.2)
PROTHROMBIN TIME: 32.1 SEC (ref 9–12.5)

## 2023-10-27 PROCEDURE — 99214 PR OFFICE/OUTPT VISIT, EST, LEVL IV, 30-39 MIN: ICD-10-PCS | Mod: HCNC,S$GLB,, | Performed by: INTERNAL MEDICINE

## 2023-10-27 PROCEDURE — 99214 OFFICE O/P EST MOD 30 MIN: CPT | Mod: HCNC,S$GLB,, | Performed by: INTERNAL MEDICINE

## 2023-10-27 PROCEDURE — 1160F RVW MEDS BY RX/DR IN RCRD: CPT | Mod: HCNC,CPTII,S$GLB, | Performed by: INTERNAL MEDICINE

## 2023-10-27 PROCEDURE — 1159F PR MEDICATION LIST DOCUMENTED IN MEDICAL RECORD: ICD-10-PCS | Mod: HCNC,CPTII,S$GLB, | Performed by: INTERNAL MEDICINE

## 2023-10-27 PROCEDURE — 3079F DIAST BP 80-89 MM HG: CPT | Mod: HCNC,CPTII,S$GLB, | Performed by: INTERNAL MEDICINE

## 2023-10-27 PROCEDURE — 3008F BODY MASS INDEX DOCD: CPT | Mod: HCNC,CPTII,S$GLB, | Performed by: INTERNAL MEDICINE

## 2023-10-27 PROCEDURE — 3008F PR BODY MASS INDEX (BMI) DOCUMENTED: ICD-10-PCS | Mod: HCNC,CPTII,S$GLB, | Performed by: INTERNAL MEDICINE

## 2023-10-27 PROCEDURE — 85610 PROTHROMBIN TIME: CPT | Mod: HCNC | Performed by: INTERNAL MEDICINE

## 2023-10-27 PROCEDURE — 99999 PR PBB SHADOW E&M-EST. PATIENT-LVL V: ICD-10-PCS | Mod: PBBFAC,HCNC,, | Performed by: INTERNAL MEDICINE

## 2023-10-27 PROCEDURE — 3074F PR MOST RECENT SYSTOLIC BLOOD PRESSURE < 130 MM HG: ICD-10-PCS | Mod: HCNC,CPTII,S$GLB, | Performed by: INTERNAL MEDICINE

## 2023-10-27 PROCEDURE — 99999 PR PBB SHADOW E&M-EST. PATIENT-LVL V: CPT | Mod: PBBFAC,HCNC,, | Performed by: INTERNAL MEDICINE

## 2023-10-27 PROCEDURE — 1159F MED LIST DOCD IN RCRD: CPT | Mod: HCNC,CPTII,S$GLB, | Performed by: INTERNAL MEDICINE

## 2023-10-27 PROCEDURE — 1160F PR REVIEW ALL MEDS BY PRESCRIBER/CLIN PHARMACIST DOCUMENTED: ICD-10-PCS | Mod: HCNC,CPTII,S$GLB, | Performed by: INTERNAL MEDICINE

## 2023-10-27 PROCEDURE — 3074F SYST BP LT 130 MM HG: CPT | Mod: HCNC,CPTII,S$GLB, | Performed by: INTERNAL MEDICINE

## 2023-10-27 PROCEDURE — 36415 COLL VENOUS BLD VENIPUNCTURE: CPT | Mod: HCNC | Performed by: INTERNAL MEDICINE

## 2023-10-27 PROCEDURE — 3079F PR MOST RECENT DIASTOLIC BLOOD PRESSURE 80-89 MM HG: ICD-10-PCS | Mod: HCNC,CPTII,S$GLB, | Performed by: INTERNAL MEDICINE

## 2023-10-27 RX ORDER — MONTELUKAST SODIUM 10 MG/1
10 TABLET ORAL NIGHTLY
Qty: 30 TABLET | Refills: 0 | Status: SHIPPED | OUTPATIENT
Start: 2023-10-27 | End: 2023-11-26

## 2023-10-27 RX ORDER — PREDNISONE 20 MG/1
TABLET ORAL
Qty: 12 TABLET | Refills: 0 | Status: ON HOLD | OUTPATIENT
Start: 2023-10-27 | End: 2023-12-13

## 2023-10-27 RX ORDER — PANTOPRAZOLE SODIUM 40 MG/1
40 TABLET, DELAYED RELEASE ORAL 2 TIMES DAILY
Qty: 30 TABLET | Refills: 0 | Status: ON HOLD | OUTPATIENT
Start: 2023-10-27 | End: 2023-12-13

## 2023-10-27 NOTE — PROGRESS NOTES
INR not at goal- just slightly elevated. Medications, chart, and patient findings reviewed. See calendar for adjustments to dose and follow up plan.

## 2023-10-27 NOTE — PROGRESS NOTES
MEDICAL HISTORY:  Pulmonary hypertension.  Hypoventilation syndrome associated with chronic respiratory failure of hypercapnia and hypoxemia.  He is on 3 liters O2.  Heart failure preserved ejection fraction  Obstructive sleep apnea with the use of CPAP.  Gout.  Patent foramen ovale., status post closure  Cholecystectomy.     SOCIAL HISTORY:  Tobacco use and alcohol use - none.     MEDICATIONS:  Allopurinol 300 mg  Advair Diskus 250/50 one puff twice a day  Tracleer 125 mg twice a day.  Lasix 80 mg  twice a day.  40 mg 2 tablets b.i.d.  Metoprolol 25 mg one twice a day.  Micro-K 10 mEq  Coumadin.  Metolazone 2.5 mg  Wednesday, Friday  Spiriva 1 puff daily    48-year-old male       Reason for his visit is that for quite awhile now whenever he goes outside or outdoors or whenever he is exposed to a strong smell such as someone's perfume cologne he gets difficulty breathing.  He feels congested in his head sometimes he may have nasal head congestion.  He feels that the situation is getting worse.  He is on the medicines of Advair disc consists Spiriva and has been told of having asthma like symptoms but can not remember definitive diagnosis of asthma.  He has pulmonary hypertension pulmonary heart disease with chronic respiratory failure.  He is on 3 L O2 and uses BiPAP at night he actually does not hear wheezing but will cough.    He reports no heartburn but he feels he gets indigestion easily in the sense of after eating a couple of bites he feels like he is eaten 5 bites.  He is not aware of regurgitation    He is followed by an outside pulmonologist with [facility].  Reportedly is last pulmonary function test was February 2022 but do not have a report regarding this    Recently was able to review blood test order through pulmonologist in early October.  There was a degree of polycythemia with hematocrit 57 and chronic kidney disease with creatinine 1.54    Examination   Weight 249 lb   Pulse 100   Blood pressure  90/62  Nasal mucosa is clear  Oropharynx no abnormal findings   Breath sounds no rales but diffuse coarse breath sounds and rhonchi in the lower lung fields and with the expiration  Heart tachycardia no murmurs  Abdominal exam soft uncomfortable in the periumbilical epigastric region    Impression   Probable chronic asthmatic bronchitis  Pulmonary heart disease with p pulmonary hypertension and chronic respiratory failure  Obstructive sleep apnea uses CPAP   polycythemia prior reactive to hypoxemic state   Chronic kidney disease 3  Dyspepsia    Plan   Follow-up renal function panel CBC TSH  Trial prednisone for the next 8 days this to help and will initiate Singulair 10 mg a day  Arrange for pulmonary function test  May need allergy referral later  Protonix 40 mg b.i.d.For 2 weeks      Addendum  Test results reviewed.  That has been a bump in the creatinine 2.0 possibly that of dehydration but because of feeling uncomfortable in the abdomen and acute kidney injury will arrange for abdominal ultrasound., hemoglobin hematocrit results reviewed polyps, polycythemia it may need discuss this with his pulmonologist

## 2023-10-28 ENCOUNTER — PATIENT MESSAGE (OUTPATIENT)
Dept: INTERNAL MEDICINE | Facility: CLINIC | Age: 48
End: 2023-10-28
Payer: MEDICARE

## 2023-11-01 ENCOUNTER — OFFICE VISIT (OUTPATIENT)
Dept: SLEEP MEDICINE | Facility: CLINIC | Age: 48
End: 2023-11-01
Payer: MEDICARE

## 2023-11-01 ENCOUNTER — PATIENT MESSAGE (OUTPATIENT)
Dept: INTERNAL MEDICINE | Facility: CLINIC | Age: 48
End: 2023-11-01
Payer: MEDICARE

## 2023-11-01 VITALS
BODY MASS INDEX: 42.65 KG/M2 | DIASTOLIC BLOOD PRESSURE: 70 MMHG | HEIGHT: 64 IN | SYSTOLIC BLOOD PRESSURE: 105 MMHG | WEIGHT: 249.81 LBS | HEART RATE: 103 BPM

## 2023-11-01 DIAGNOSIS — I27.9 CHRONIC PULMONARY HEART DISEASE: Primary | ICD-10-CM

## 2023-11-01 DIAGNOSIS — Z99.81 OXYGEN DEPENDENT: ICD-10-CM

## 2023-11-01 DIAGNOSIS — G47.33 OSA (OBSTRUCTIVE SLEEP APNEA): ICD-10-CM

## 2023-11-01 PROCEDURE — 3008F PR BODY MASS INDEX (BMI) DOCUMENTED: ICD-10-PCS | Mod: HCNC,CPTII,S$GLB, | Performed by: NURSE PRACTITIONER

## 2023-11-01 PROCEDURE — 99214 OFFICE O/P EST MOD 30 MIN: CPT | Mod: HCNC,S$GLB,, | Performed by: NURSE PRACTITIONER

## 2023-11-01 PROCEDURE — 3078F DIAST BP <80 MM HG: CPT | Mod: HCNC,CPTII,S$GLB, | Performed by: NURSE PRACTITIONER

## 2023-11-01 PROCEDURE — 3008F BODY MASS INDEX DOCD: CPT | Mod: HCNC,CPTII,S$GLB, | Performed by: NURSE PRACTITIONER

## 2023-11-01 PROCEDURE — 3074F SYST BP LT 130 MM HG: CPT | Mod: HCNC,CPTII,S$GLB, | Performed by: NURSE PRACTITIONER

## 2023-11-01 PROCEDURE — 1159F MED LIST DOCD IN RCRD: CPT | Mod: HCNC,CPTII,S$GLB, | Performed by: NURSE PRACTITIONER

## 2023-11-01 PROCEDURE — 3078F PR MOST RECENT DIASTOLIC BLOOD PRESSURE < 80 MM HG: ICD-10-PCS | Mod: HCNC,CPTII,S$GLB, | Performed by: NURSE PRACTITIONER

## 2023-11-01 PROCEDURE — 99214 PR OFFICE/OUTPT VISIT, EST, LEVL IV, 30-39 MIN: ICD-10-PCS | Mod: HCNC,S$GLB,, | Performed by: NURSE PRACTITIONER

## 2023-11-01 PROCEDURE — 3074F PR MOST RECENT SYSTOLIC BLOOD PRESSURE < 130 MM HG: ICD-10-PCS | Mod: HCNC,CPTII,S$GLB, | Performed by: NURSE PRACTITIONER

## 2023-11-01 PROCEDURE — 99999 PR PBB SHADOW E&M-EST. PATIENT-LVL III: ICD-10-PCS | Mod: PBBFAC,HCNC,, | Performed by: NURSE PRACTITIONER

## 2023-11-01 PROCEDURE — 1159F PR MEDICATION LIST DOCUMENTED IN MEDICAL RECORD: ICD-10-PCS | Mod: HCNC,CPTII,S$GLB, | Performed by: NURSE PRACTITIONER

## 2023-11-01 PROCEDURE — 99999 PR PBB SHADOW E&M-EST. PATIENT-LVL III: CPT | Mod: PBBFAC,HCNC,, | Performed by: NURSE PRACTITIONER

## 2023-11-01 NOTE — PROGRESS NOTES
"Cc: MALLIKA   He continues to wear his BPAP 16/11 with 3 L at night (continuous O2). POOR SLEEP  SINCE COVID-19 3/2023. SINCE BEGINNIN [employer] SLEEP HAS IMPROVED  Fifi View mask, Can't sleep w/o his machine  Getting good body check,starting with teeth    INTERROGATION 30davg 7.4h/n AHI 1.2, 99% mask fit  /70 (BP Location: Left arm, Patient Position: Sitting, BP Method: Medium (Automatic))   Pulse 103   Ht 5' 4" (1.626 m)   Wt 113.3 kg (249 lb 12.5 oz)   BMI 42.87 kg/m²     PSG 5/29/08:  and SaO2 of 62% (sleep efficiency 90.7%).  CPAP titration on 10/13/11: Effective control of respiratory events was achieved at 12/8 with best results achieved at 14/9 cm of H2O. Weight 250 lbs.  Titration study 4/30/14: Effective control of sleep disordered breathing was seen in supine REM sleep on 16/11 cm H2O. EPAP lower than 11 cm H2O was associated obstructive apneas. Higher IPAP 17-18 cm H2O were associated with central apneas    IMPRESSION:   1. Obstructive sleep apnea - overlap syndrome. Tolerates 16/11with 3 LO2. Continues to benefit from therapy AHI<5  2. Chronic Pulmonary  Disease, pulmonary HTN  Paroxysmal atrial fibrillation      PLAN:  Continue BPAP 16/11cm. Continue  3L O2 with pulm f/u as advised. No need for titration study  Discussed health benefits of continued BPAP compliance   See cards as advised/continue meds  RTC 1-yr, sooner if needed.               "

## 2023-11-08 ENCOUNTER — PATIENT MESSAGE (OUTPATIENT)
Dept: INTERNAL MEDICINE | Facility: CLINIC | Age: 48
End: 2023-11-08
Payer: MEDICARE

## 2023-11-09 ENCOUNTER — LAB VISIT (OUTPATIENT)
Dept: LAB | Facility: HOSPITAL | Age: 48
End: 2023-11-09
Attending: INTERNAL MEDICINE
Payer: MEDICARE

## 2023-11-09 DIAGNOSIS — I27.9 CHRONIC PULMONARY HEART DISEASE: ICD-10-CM

## 2023-11-09 DIAGNOSIS — Z79.01 LONG TERM CURRENT USE OF ANTICOAGULANT THERAPY: ICD-10-CM

## 2023-11-09 LAB
INR PPP: 3.3 (ref 0.8–1.2)
PROTHROMBIN TIME: 33.1 SEC (ref 9–12.5)

## 2023-11-09 PROCEDURE — 36415 COLL VENOUS BLD VENIPUNCTURE: CPT | Mod: HCNC | Performed by: INTERNAL MEDICINE

## 2023-11-09 PROCEDURE — 85610 PROTHROMBIN TIME: CPT | Mod: HCNC | Performed by: INTERNAL MEDICINE

## 2023-11-10 ENCOUNTER — ANTI-COAG VISIT (OUTPATIENT)
Dept: CARDIOLOGY | Facility: CLINIC | Age: 48
End: 2023-11-10
Payer: MEDICARE

## 2023-11-10 DIAGNOSIS — I27.9 CHRONIC PULMONARY HEART DISEASE: Primary | ICD-10-CM

## 2023-11-10 DIAGNOSIS — Z79.01 LONG TERM CURRENT USE OF ANTICOAGULANT THERAPY: ICD-10-CM

## 2023-11-10 PROCEDURE — 93793 PR ANTICOAGULANT MGMT FOR PT TAKING WARFARIN: ICD-10-PCS | Mod: S$GLB,,,

## 2023-11-10 PROCEDURE — 93793 ANTICOAG MGMT PT WARFARIN: CPT | Mod: S$GLB,,,

## 2023-11-10 NOTE — PROGRESS NOTES
[facility]    11/10/2023 12:57 PM    Assessment/Plan:    Patient presents today with supratherapeutic INR.    Assessment of patient findings and chart review: Patient reports missed dose yesterday. Also, recently completed prednisone course.    Recommendation for patient's warfarin regimen: Continue current maintenance dose as patient missed dose yesterday    Recommend repeat INR in 1 week  _________________________________________________________________    Yong Mcintyre (48 y.o.) is followed by the [facility].    Anticoagulation Summary  As of 11/10/2023      INR goal:  2.0-3.0   TTR:  54.2 % (11 y)   INR used for dosing:  3.3 (11/9/2023)   Warfarin maintenance plan:  0 mg every Mon; 5 mg (10 mg x 0.5) all other days   Weekly warfarin total:  30 mg   Plan last modified:  Pierce Bush, PharmD (9/18/2023)   Next INR check:  11/15/2023   Target end date:  Indefinite    Indications    Chronic pulmonary heart disease  unspecified [I27.9]  Long term current use of anticoagulant therapy [Z79.01]                 Anticoagulation Episode Summary       INR check location:  Clinic Lab    Preferred lab:  [facility]    Send INR reminders to:  [facility] COUMADIN MONITORING POOL    Comments:  [facility]//Infusion Suite, every other Mon -- ph [phone]// <VENIPUNCTURE ONLY (POC/meter does not work for pt)>          Anticoagulation Care Providers       Provider Role Specialty Phone number    Gianni Escalona MD [facility] Internal Medicine [phone]            Patient Findings       Positives:  Missed doses, Change in medications    Negatives:  Signs/symptoms of thrombosis, Signs/symptoms of bleeding, Laboratory test error suspected, Change in health, Change in alcohol use, Change in activity, Upcoming invasive procedure, Emergency department visit, Upcoming dental procedure, Extra doses, Change in  diet/appetite, Hospital admission, Bruising, Other complaints    Comments:  0mg mon and 5mg all other days  Missed 11/9, pt was on prednisone but is finished with it and allergy and pt had no greens

## 2023-11-15 ENCOUNTER — LAB VISIT (OUTPATIENT)
Dept: LAB | Facility: HOSPITAL | Age: 48
End: 2023-11-15
Attending: INTERNAL MEDICINE
Payer: MEDICARE

## 2023-11-15 ENCOUNTER — ANTI-COAG VISIT (OUTPATIENT)
Dept: CARDIOLOGY | Facility: CLINIC | Age: 48
End: 2023-11-15
Payer: MEDICARE

## 2023-11-15 DIAGNOSIS — I27.9 CHRONIC PULMONARY HEART DISEASE: Primary | ICD-10-CM

## 2023-11-15 DIAGNOSIS — I27.9 CHRONIC PULMONARY HEART DISEASE: ICD-10-CM

## 2023-11-15 DIAGNOSIS — Z79.01 LONG TERM CURRENT USE OF ANTICOAGULANT THERAPY: ICD-10-CM

## 2023-11-15 LAB
INR PPP: 1.9 (ref 0.8–1.2)
PROTHROMBIN TIME: 19.5 SEC (ref 9–12.5)

## 2023-11-15 PROCEDURE — 93793 PR ANTICOAGULANT MGMT FOR PT TAKING WARFARIN: ICD-10-PCS | Mod: S$GLB,,, | Performed by: PHARMACIST

## 2023-11-15 PROCEDURE — 36415 COLL VENOUS BLD VENIPUNCTURE: CPT | Mod: HCNC | Performed by: INTERNAL MEDICINE

## 2023-11-15 PROCEDURE — 93793 ANTICOAG MGMT PT WARFARIN: CPT | Mod: S$GLB,,, | Performed by: PHARMACIST

## 2023-11-15 PROCEDURE — 85610 PROTHROMBIN TIME: CPT | Mod: HCNC | Performed by: INTERNAL MEDICINE

## 2023-11-30 ENCOUNTER — LAB VISIT (OUTPATIENT)
Dept: LAB | Facility: HOSPITAL | Age: 48
End: 2023-11-30
Attending: INTERNAL MEDICINE
Payer: MEDICARE

## 2023-11-30 DIAGNOSIS — I27.9 CHRONIC PULMONARY HEART DISEASE: ICD-10-CM

## 2023-11-30 DIAGNOSIS — Z79.01 LONG TERM CURRENT USE OF ANTICOAGULANT THERAPY: ICD-10-CM

## 2023-11-30 LAB
INR PPP: 4.5 (ref 0.8–1.2)
PROTHROMBIN TIME: 44.1 SEC (ref 9–12.5)

## 2023-11-30 PROCEDURE — 36415 COLL VENOUS BLD VENIPUNCTURE: CPT | Mod: HCNC | Performed by: INTERNAL MEDICINE

## 2023-11-30 PROCEDURE — 85610 PROTHROMBIN TIME: CPT | Mod: HCNC | Performed by: INTERNAL MEDICINE

## 2023-12-01 ENCOUNTER — ANTI-COAG VISIT (OUTPATIENT)
Dept: CARDIOLOGY | Facility: CLINIC | Age: 48
End: 2023-12-01
Payer: MEDICARE

## 2023-12-01 DIAGNOSIS — I27.9 CHRONIC PULMONARY HEART DISEASE: Primary | ICD-10-CM

## 2023-12-01 DIAGNOSIS — Z79.01 LONG TERM CURRENT USE OF ANTICOAGULANT THERAPY: ICD-10-CM

## 2023-12-01 PROCEDURE — 93793 PR ANTICOAGULANT MGMT FOR PT TAKING WARFARIN: ICD-10-PCS | Mod: S$GLB,,, | Performed by: PHARMACIST

## 2023-12-01 PROCEDURE — 93793 ANTICOAG MGMT PT WARFARIN: CPT | Mod: S$GLB,,, | Performed by: PHARMACIST

## 2023-12-01 NOTE — PROGRESS NOTES
[facility]    2023 9:50 AM    Assessment/Plan:    Patient presents today with supratherapeutic INR.    Assessment of patient findings and chart review: INR not at goal. Medications, chart, and patient findings reviewed. See calendar for adjustments to dose and follow up plan.      Recommendation for patient's warfarin regimen:  per calendar    Recommend repeat INR in 1 week  _________________________________________________________________    Yong Mcintyre (48 y.o.) is followed by the [facility].    Anticoagulation Summary  As of 2023      INR goal:  2.0-3.0   TTR:  54.2 % (11.1 y)   INR used for dosin.5 (2023)   Warfarin maintenance plan:  0 mg every Mon, Fri; 5 mg (10 mg x 0.5) all other days   Weekly warfarin total:  25 mg   Plan last modified:  Pierce Bush, PharmD (2023)   Next INR check:  2023   Target end date:  Indefinite    Indications    Chronic pulmonary heart disease  unspecified [I27.9]  Long term current use of anticoagulant therapy [Z79.01]                 Anticoagulation Episode Summary       INR check location:  Clinic Lab    Preferred lab:  [facility]    Send INR reminders to:  [facility] COUMADIN MONITORING POOL    Comments:  [facility]//Infusion Suite, every other Mon -- ph [phone]// <VENIPUNCTURE ONLY (POC/meter does not work for pt)>          Anticoagulation Care Providers       Provider Role Specialty Phone number    Gianni Escalona MD [facility] Internal Medicine [phone]            Patient Findings       Positives:  Change in alcohol use    Negatives:  Signs/symptoms of thrombosis, Signs/symptoms of bleeding, Laboratory test error suspected, Change in health, Change in activity, Upcoming invasive procedure, Emergency department visit, Upcoming dental procedure, Missed doses, Extra doses, Change in medications, Change in diet/appetite,  Hospital admission, Bruising, Other complaints    Comments:  Patient questioned regarding elevated INR. Patient gave proper dosing for all days, except 11/20 & 11/27. Patient held warfarin doses on those days, when he was prescribed to take 5 mg. Patient reported drinking EtOH on 11/23. He denied other changes, and s/sx of bleeding. Patient advised to visit ED in the event of s/sx of bleeding.

## 2023-12-05 DIAGNOSIS — Z79.01 LONG TERM CURRENT USE OF ANTICOAGULANT THERAPY: ICD-10-CM

## 2023-12-05 DIAGNOSIS — I27.9 CHRONIC PULMONARY HEART DISEASE: ICD-10-CM

## 2023-12-05 DIAGNOSIS — G47.33 OSA (OBSTRUCTIVE SLEEP APNEA): ICD-10-CM

## 2023-12-05 DIAGNOSIS — I27.9 CHRONIC CARDIOPULMONARY DISEASE: ICD-10-CM

## 2023-12-05 DIAGNOSIS — E66.01 MORBID OBESITY: ICD-10-CM

## 2023-12-05 DIAGNOSIS — E66.2 PICKWICKIAN SYNDROME: ICD-10-CM

## 2023-12-05 DIAGNOSIS — I27.81 COR PULMONALE: ICD-10-CM

## 2023-12-05 RX ORDER — WARFARIN 10 MG/1
TABLET ORAL
Qty: 30 TABLET | Refills: 1 | Status: ON HOLD | OUTPATIENT
Start: 2023-12-05 | End: 2023-12-15

## 2023-12-05 NOTE — TELEPHONE ENCOUNTER
----- Message from Angie Paul sent at 12/5/2023 12:34 PM CST -----  Contact: [phone]  Requesting an RX refill or new RX.  Is this a refill or new RX: refill  RX name and strength (copy/paste from chart):  warfarin (COUMADIN) 10 MG tablet  Is this a 30 day or 90 day RX: 30  Pharmacy name and phone # (copy/paste from chart):  [facility] #[id number] - [address] & [address] [address]  Phone: [phone] Fax: [phone]       The doctors have asked that we provide their patients with the following 2 reminders -- prescription refills can take up to 72 hours, and a friendly reminder that in the future you can use your [facility] account to request refills: yes

## 2023-12-11 ENCOUNTER — HOSPITAL ENCOUNTER (INPATIENT)
Facility: HOSPITAL | Age: 48
LOS: 2 days | Discharge: HOME | DRG: 189 | End: 2023-12-15
Attending: EMERGENCY MEDICINE | Admitting: HOSPITALIST
Payer: MEDICARE

## 2023-12-11 ENCOUNTER — ANTI-COAG VISIT (OUTPATIENT)
Dept: CARDIOLOGY | Facility: CLINIC | Age: 48
End: 2023-12-11

## 2023-12-11 DIAGNOSIS — E66.2 PICKWICKIAN SYNDROME: ICD-10-CM

## 2023-12-11 DIAGNOSIS — I50.33 ACUTE ON CHRONIC HEART FAILURE WITH PRESERVED EJECTION FRACTION (HFPEF): ICD-10-CM

## 2023-12-11 DIAGNOSIS — R10.13 EPIGASTRIC ABDOMINAL PAIN: ICD-10-CM

## 2023-12-11 DIAGNOSIS — I27.9 CHRONIC CARDIOPULMONARY DISEASE: ICD-10-CM

## 2023-12-11 DIAGNOSIS — R07.9 CHEST PAIN: ICD-10-CM

## 2023-12-11 DIAGNOSIS — E66.01 MORBID OBESITY: ICD-10-CM

## 2023-12-11 DIAGNOSIS — Z79.01 LONG TERM CURRENT USE OF ANTICOAGULANT THERAPY: ICD-10-CM

## 2023-12-11 DIAGNOSIS — I27.9 CHRONIC PULMONARY HEART DISEASE: ICD-10-CM

## 2023-12-11 DIAGNOSIS — G47.33 OSA (OBSTRUCTIVE SLEEP APNEA): ICD-10-CM

## 2023-12-11 DIAGNOSIS — J96.21 ACUTE ON CHRONIC RESPIRATORY FAILURE WITH HYPOXIA: Primary | ICD-10-CM

## 2023-12-11 DIAGNOSIS — I27.9 CHRONIC PULMONARY HEART DISEASE: Primary | ICD-10-CM

## 2023-12-11 DIAGNOSIS — I27.81 COR PULMONALE: ICD-10-CM

## 2023-12-11 PROBLEM — J44.89 CHRONIC ASTHMATIC BRONCHITIS: Status: ACTIVE | Noted: 2023-12-11

## 2023-12-11 PROBLEM — N18.30 CKD (CHRONIC KIDNEY DISEASE), STAGE III: Status: ACTIVE | Noted: 2023-12-11

## 2023-12-11 LAB
ALBUMIN SERPL BCP-MCNC: 2.8 G/DL (ref 3.5–5.2)
ALLENS TEST: ABNORMAL
ALP SERPL-CCNC: 116 U/L (ref 55–135)
ALT SERPL W/O P-5'-P-CCNC: 22 U/L (ref 10–44)
ANION GAP SERPL CALC-SCNC: 9 MMOL/L (ref 8–16)
AST SERPL-CCNC: 23 U/L (ref 10–40)
BASOPHILS # BLD AUTO: 0.04 K/UL (ref 0–0.2)
BASOPHILS NFR BLD: 0.4 % (ref 0–1.9)
BILIRUB SERPL-MCNC: 1.4 MG/DL (ref 0.1–1)
BNP SERPL-MCNC: 776 PG/ML (ref 0–99)
BUN SERPL-MCNC: 71 MG/DL (ref 6–20)
CALCIUM SERPL-MCNC: 9.2 MG/DL (ref 8.7–10.5)
CHLORIDE SERPL-SCNC: 83 MMOL/L (ref 95–110)
CO2 SERPL-SCNC: 39 MMOL/L (ref 23–29)
CREAT SERPL-MCNC: 1.9 MG/DL (ref 0.5–1.4)
DIFFERENTIAL METHOD: ABNORMAL
EOSINOPHIL # BLD AUTO: 0 K/UL (ref 0–0.5)
EOSINOPHIL NFR BLD: 0.3 % (ref 0–8)
ERYTHROCYTE [DISTWIDTH] IN BLOOD BY AUTOMATED COUNT: 21.1 % (ref 11.5–14.5)
EST. GFR  (NO RACE VARIABLE): 43 ML/MIN/1.73 M^2
GLUCOSE SERPL-MCNC: 110 MG/DL (ref 70–110)
HCO3 UR-SCNC: 31.4 MMOL/L (ref 24–28)
HCT VFR BLD AUTO: 55.5 % (ref 40–54)
HCV AB SERPL QL IA: NORMAL
HGB BLD-MCNC: 16.6 G/DL (ref 14–18)
HIV 1+2 AB+HIV1 P24 AG SERPL QL IA: NORMAL
IMM GRANULOCYTES # BLD AUTO: 0.03 K/UL (ref 0–0.04)
IMM GRANULOCYTES NFR BLD AUTO: 0.3 % (ref 0–0.5)
INR PPP: 1.3 (ref 0.8–1.2)
LIPASE SERPL-CCNC: 13 U/L (ref 4–60)
LYMPHOCYTES # BLD AUTO: 1.2 K/UL (ref 1–4.8)
LYMPHOCYTES NFR BLD: 13.1 % (ref 18–48)
MCH RBC QN AUTO: 24.9 PG (ref 27–31)
MCHC RBC AUTO-ENTMCNC: 29.9 G/DL (ref 32–36)
MCV RBC AUTO: 83 FL (ref 82–98)
MONOCYTES # BLD AUTO: 0.8 K/UL (ref 0.3–1)
MONOCYTES NFR BLD: 9 % (ref 4–15)
NEUTROPHILS # BLD AUTO: 7.1 K/UL (ref 1.8–7.7)
NEUTROPHILS NFR BLD: 76.9 % (ref 38–73)
NRBC BLD-RTO: 0 /100 WBC
PCO2 BLDA: 54.7 MMHG (ref 35–45)
PH SMN: 7.37 [PH] (ref 7.35–7.45)
PLATELET # BLD AUTO: 223 K/UL (ref 150–450)
PMV BLD AUTO: 9.8 FL (ref 9.2–12.9)
PO2 BLDA: 27 MMHG (ref 40–60)
POC BE: 6 MMOL/L
POC CARDIAC TROPONIN I: 0 NG/ML (ref 0–0.08)
POC SATURATED O2: 46 % (ref 95–100)
POC TCO2: 33 MMOL/L (ref 24–29)
POTASSIUM SERPL-SCNC: 3.1 MMOL/L (ref 3.5–5.1)
PROT SERPL-MCNC: 7.5 G/DL (ref 6–8.4)
PROTHROMBIN TIME: 13.3 SEC (ref 9–12.5)
RBC # BLD AUTO: 6.68 M/UL (ref 4.6–6.2)
SAMPLE: ABNORMAL
SAMPLE: NORMAL
SITE: ABNORMAL
SODIUM SERPL-SCNC: 131 MMOL/L (ref 136–145)
TROPONIN I SERPL DL<=0.01 NG/ML-MCNC: 0.01 NG/ML (ref 0–0.03)
TROPONIN I SERPL DL<=0.01 NG/ML-MCNC: 0.02 NG/ML (ref 0–0.03)
WBC # BLD AUTO: 9.26 K/UL (ref 3.9–12.7)

## 2023-12-11 PROCEDURE — 87389 HIV-1 AG W/HIV-1&-2 AB AG IA: CPT | Mod: HCNC | Performed by: PHYSICIAN ASSISTANT

## 2023-12-11 PROCEDURE — 96375 TX/PRO/DX INJ NEW DRUG ADDON: CPT | Mod: HCNC

## 2023-12-11 PROCEDURE — 82803 BLOOD GASES ANY COMBINATION: CPT | Mod: HCNC

## 2023-12-11 PROCEDURE — 27000221 HC OXYGEN, UP TO 24 HOURS: Mod: HCNC

## 2023-12-11 PROCEDURE — 85610 PROTHROMBIN TIME: CPT | Mod: HCNC | Performed by: EMERGENCY MEDICINE

## 2023-12-11 PROCEDURE — 84484 ASSAY OF TROPONIN QUANT: CPT | Mod: HCNC

## 2023-12-11 PROCEDURE — 63600175 PHARM REV CODE 636 W HCPCS: Mod: HCNC

## 2023-12-11 PROCEDURE — 93005 ELECTROCARDIOGRAM TRACING: CPT | Mod: HCNC

## 2023-12-11 PROCEDURE — 94761 N-INVAS EAR/PLS OXIMETRY MLT: CPT | Mod: HCNC,XB

## 2023-12-11 PROCEDURE — 25000003 PHARM REV CODE 250: Mod: HCNC

## 2023-12-11 PROCEDURE — 85025 COMPLETE CBC W/AUTO DIFF WBC: CPT | Mod: HCNC | Performed by: EMERGENCY MEDICINE

## 2023-12-11 PROCEDURE — 96376 TX/PRO/DX INJ SAME DRUG ADON: CPT | Mod: HCNC

## 2023-12-11 PROCEDURE — 93010 ELECTROCARDIOGRAM REPORT: CPT | Mod: HCNC,,, | Performed by: INTERNAL MEDICINE

## 2023-12-11 PROCEDURE — 99900035 HC TECH TIME PER 15 MIN (STAT): Mod: HCNC

## 2023-12-11 PROCEDURE — 80053 COMPREHEN METABOLIC PANEL: CPT | Mod: HCNC | Performed by: EMERGENCY MEDICINE

## 2023-12-11 PROCEDURE — G0378 HOSPITAL OBSERVATION PER HR: HCPCS | Mod: HCNC

## 2023-12-11 PROCEDURE — 93010 EKG 12-LEAD: ICD-10-PCS | Mod: HCNC,,, | Performed by: INTERNAL MEDICINE

## 2023-12-11 PROCEDURE — 86677 HELICOBACTER PYLORI ANTIBODY: CPT | Mod: HCNC

## 2023-12-11 PROCEDURE — 63600175 PHARM REV CODE 636 W HCPCS: Mod: HCNC | Performed by: EMERGENCY MEDICINE

## 2023-12-11 PROCEDURE — 86803 HEPATITIS C AB TEST: CPT | Mod: HCNC | Performed by: PHYSICIAN ASSISTANT

## 2023-12-11 PROCEDURE — 99285 EMERGENCY DEPT VISIT HI MDM: CPT | Mod: 25,HCNC

## 2023-12-11 PROCEDURE — 83880 ASSAY OF NATRIURETIC PEPTIDE: CPT | Mod: HCNC | Performed by: EMERGENCY MEDICINE

## 2023-12-11 PROCEDURE — 84484 ASSAY OF TROPONIN QUANT: CPT | Mod: HCNC | Performed by: EMERGENCY MEDICINE

## 2023-12-11 PROCEDURE — 83690 ASSAY OF LIPASE: CPT | Mod: HCNC | Performed by: EMERGENCY MEDICINE

## 2023-12-11 RX ORDER — FLUTICASONE FUROATE AND VILANTEROL 100; 25 UG/1; UG/1
1 POWDER RESPIRATORY (INHALATION) DAILY
Status: DISCONTINUED | OUTPATIENT
Start: 2023-12-12 | End: 2023-12-15 | Stop reason: HOSPADM

## 2023-12-11 RX ORDER — FUROSEMIDE 10 MG/ML
100 INJECTION INTRAMUSCULAR; INTRAVENOUS DAILY
Status: DISCONTINUED | OUTPATIENT
Start: 2023-12-12 | End: 2023-12-12

## 2023-12-11 RX ORDER — GLUCAGON 1 MG
1 KIT INJECTION
Status: DISCONTINUED | OUTPATIENT
Start: 2023-12-11 | End: 2023-12-15 | Stop reason: HOSPADM

## 2023-12-11 RX ORDER — METOPROLOL TARTRATE 25 MG/1
25 TABLET, FILM COATED ORAL 2 TIMES DAILY
Status: DISCONTINUED | OUTPATIENT
Start: 2023-12-11 | End: 2023-12-15 | Stop reason: HOSPADM

## 2023-12-11 RX ORDER — POTASSIUM CHLORIDE 20 MEQ/1
40 TABLET, EXTENDED RELEASE ORAL
Status: COMPLETED | OUTPATIENT
Start: 2023-12-11 | End: 2023-12-11

## 2023-12-11 RX ORDER — FUROSEMIDE 10 MG/ML
40 INJECTION INTRAMUSCULAR; INTRAVENOUS ONCE
Status: COMPLETED | OUTPATIENT
Start: 2023-12-11 | End: 2023-12-11

## 2023-12-11 RX ORDER — FUROSEMIDE 10 MG/ML
20 INJECTION INTRAMUSCULAR; INTRAVENOUS
Status: COMPLETED | OUTPATIENT
Start: 2023-12-11 | End: 2023-12-11

## 2023-12-11 RX ORDER — IBUPROFEN 200 MG
24 TABLET ORAL
Status: DISCONTINUED | OUTPATIENT
Start: 2023-12-11 | End: 2023-12-15 | Stop reason: HOSPADM

## 2023-12-11 RX ORDER — WARFARIN SODIUM 5 MG/1
5 TABLET ORAL DAILY
Status: DISCONTINUED | OUTPATIENT
Start: 2023-12-11 | End: 2023-12-13

## 2023-12-11 RX ORDER — IBUPROFEN 200 MG
16 TABLET ORAL
Status: DISCONTINUED | OUTPATIENT
Start: 2023-12-11 | End: 2023-12-15 | Stop reason: HOSPADM

## 2023-12-11 RX ORDER — PANTOPRAZOLE SODIUM 40 MG/1
40 TABLET, DELAYED RELEASE ORAL DAILY
Status: DISCONTINUED | OUTPATIENT
Start: 2023-12-11 | End: 2023-12-15 | Stop reason: HOSPADM

## 2023-12-11 RX ORDER — NALOXONE HCL 0.4 MG/ML
0.02 VIAL (ML) INJECTION
Status: DISCONTINUED | OUTPATIENT
Start: 2023-12-11 | End: 2023-12-15 | Stop reason: HOSPADM

## 2023-12-11 RX ORDER — SODIUM CHLORIDE 0.9 % (FLUSH) 0.9 %
10 SYRINGE (ML) INJECTION
Status: CANCELLED | OUTPATIENT
Start: 2023-12-11

## 2023-12-11 RX ORDER — ALLOPURINOL 300 MG/1
300 TABLET ORAL DAILY
Status: DISCONTINUED | OUTPATIENT
Start: 2023-12-12 | End: 2023-12-15 | Stop reason: HOSPADM

## 2023-12-11 RX ORDER — SODIUM CHLORIDE 0.9 % (FLUSH) 0.9 %
10 SYRINGE (ML) INJECTION EVERY 12 HOURS PRN
Status: DISCONTINUED | OUTPATIENT
Start: 2023-12-11 | End: 2023-12-15 | Stop reason: HOSPADM

## 2023-12-11 RX ORDER — TALC
6 POWDER (GRAM) TOPICAL NIGHTLY PRN
Status: CANCELLED | OUTPATIENT
Start: 2023-12-11

## 2023-12-11 RX ADMIN — METOPROLOL TARTRATE 25 MG: 25 TABLET, FILM COATED ORAL at 08:12

## 2023-12-11 RX ADMIN — PANTOPRAZOLE SODIUM 40 MG: 40 TABLET, DELAYED RELEASE ORAL at 07:12

## 2023-12-11 RX ADMIN — WARFARIN SODIUM 5 MG: 5 TABLET ORAL at 06:12

## 2023-12-11 RX ADMIN — FUROSEMIDE 40 MG: 10 INJECTION, SOLUTION INTRAVENOUS at 08:12

## 2023-12-11 RX ADMIN — POTASSIUM CHLORIDE 40 MEQ: 1500 TABLET, EXTENDED RELEASE ORAL at 08:12

## 2023-12-11 RX ADMIN — FUROSEMIDE 20 MG: 10 INJECTION, SOLUTION INTRAMUSCULAR; INTRAVENOUS at 04:12

## 2023-12-11 RX ADMIN — POTASSIUM CHLORIDE 40 MEQ: 1500 TABLET, EXTENDED RELEASE ORAL at 07:12

## 2023-12-11 NOTE — HPI
"Mr. Mcintyre is a 49 y/o male with a PMH of HFrEF, HTN, pHTN, Chronic hypoxic respiratory failure (baseline oxygen req at home is 3 L), PFO (unsuccessful closure in 2006), AF (on coumadin, pt reports his "blood pools" that's why he's on it), Obesity hypoventilation syndrome, Morbid obesity presenting to the ED due to epigastric bloating that has been making feel SOB. Patient reports that this is a chronic issue that has been ongoing since February/March when he had Covid. Patient reports a few weeks or maybe a month later he has been having this epigastric bloating and associated belching that has been reoccurring. Patient reports that he visited his PCP, and suspected a role of GERD and prescribed him Protonix with little improvement at first. But when he ran out of his medication he never bothered to refill it because he felt like it never resolved his symptoms. Patient suspected it might've been 2/2 to BIPAP machine and "cleaned it thoroughly" and it resolved his symptoms mildly. Patient reports no specific association with any activity. He reports that when he eats the symptoms come on and bother him. He reports no particular change in his diet, was eating comfort foods a few months ago but transitioned his diet slowly to include more greens. Patient denies fever, N/V/D, no changes in BM, no increasing passing of gas, no chills, dysuria, flank pain, headaches, and orthopnea. Patient does admit to epigastric bloating, SOB (seems to be chronic but worsens with bloating), belching, and feeling mildly nauseous. No inciting event noted. Patient has been adherent to medication regimen consisting diuretics. No new sick contacts. Performs ADL's and works out.    Upon presentation to the ED, patient was noted to be in volume overloaded state. With increased oxygen requirements from baseline. Now on 8 L NC. Patient also noted to have b/t LE edema (patient reports more than usual). CXR in the ED revealed "  Reconfirmed " "pulmonary vascular congestion and bilateral interstitial and ground-glass opacities not felt significantly changed to above 2 exams and which could be on the basis of edema and or infection". Labs in the ED notable for elevated  (last checked it was 31 in May), Trop was 0.016 (neg), Na 131, K 3.1, CO2 39, Cr 1.9 (bl 1.4-1.5). Patient is HDS and afebrile.   "

## 2023-12-11 NOTE — ED NOTES
"Pt presents to ED via personal transport w/ c/o progressively worsening shortness of breath. Initial oxygen saturation in the upper 70s in triage on a NRB. Pt states he wear oxygen at home -- 3 LPM via NC. States he has not taken his oxygen saturation at home "for months" so he is unsure what it has been. Pt also chest pain near epigastric region + endorsing abdominal discomfort -- states he "feels bloated and it's making it hard to take a deep breath."    Patient identifiers for Yong Mcintyre 48 y.o. male checked and correct.  Chief Complaint   Patient presents with    Abdominal Pain     Pt arrives from home c/o abd pain; reports feeling bloated recently; arrive on 4L NC, usually on 3     Past Medical History:   Diagnosis Date    Arthritis     CHF (congestive heart failure)     Cor pulmonale 11/5/2012    Diastolic dysfunction     Gallstones     Hypertension     Left ventricular systolic dysfunction 11/5/2012    Morbid obesity 11/5/2012    Obesity     MALLIKA (obstructive sleep apnea)     PFO (patent foramen ovale) 11/5/2012    Pickwickian syndrome 11/5/2012    Pulmonary hypertension     Respiratory failure, acute-on-chronic 3/7/2014     Allergies reported:   Review of patient's allergies indicates:   Allergen Reactions    Lipitor [atorvastatin] Other (See Comments)     "it makes my legs feel like jelly"  Other reaction(s): causes legs to hurt     "

## 2023-12-11 NOTE — FIRST PROVIDER EVALUATION
"Medical screening examination initiated.  I have conducted a focused provider triage encounter, findings are as follows:    Brief history of present illness:  Hx cor pulmonale, parox afib. On AF and home O2. Normally wears 3L, now on 4.    Normal sat 90-92%.     CC: abdominal bloating, making it difficult to breathe. Epigastric abd  pain  as well.  Saw Dr. Escalona for this "a  while ago."  Taking pantoprazole, finished it.    There were no vitals filed for this visit.    Pertinent physical exam:  Ill appearing. Sat 70%, tried both hands.     Brief workup plan:  Labs, CXR. Straight back to  room.     Preliminary workup initiated; this workup will be continued and followed by the physician or advanced practice provider that is assigned to the patient when roomed.  "

## 2023-12-11 NOTE — ED PROVIDER NOTES
"Encounter Date: 12/11/2023       History     Chief Complaint   Patient presents with    Abdominal Pain     Pt arrives from home c/o abd pain; reports feeling bloated recently; arrive on 4L NC, usually on 3     HPI  48-year-old male, with history of heart failure, cor pulmonale, hypertension, Pickwickian syndrome, presents to the ED for abdominal bloating.  Patient reports that he has been feeling bloated for the last few days, despite having normal bowel movement Patient believes he has shortness of breath from being bloated.  Patient also noticed to have increased bilateral lower extremity swelling and he does not have good UO from his lasix. Denies fever, chest pain, nausea or vomiting.   Review of patient's allergies indicates:   Allergen Reactions    Lipitor [atorvastatin] Other (See Comments)     "it makes my legs feel like jelly"  Other reaction(s): causes legs to hurt     Past Medical History:   Diagnosis Date    Arthritis     CHF (congestive heart failure)     Diastolic dysfunction    Cor pulmonale 11/05/2012    Gallstones     Hypertension     Morbid obesity 11/05/2012    Obesity hypoventilation syndrome     On home oxygen therapy 03/07/2014    MALLIKA (obstructive sleep apnea)     BPAP 16/11 with 3 L at night (continuous O2).    Paroxysmal atrial fibrillation     PFO (patent foramen ovale) 11/05/2012    status post closure    Pickwickian syndrome 11/05/2012    Pulmonary hypertension      Past Surgical History:   Procedure Laterality Date    RIGHT HEART CATHETERIZATION Right 3/22/2022    Procedure: INSERTION, CATHETER, RIGHT HEART;  Surgeon: Tony Martínez MD;  Location: [facility] CATH LAB;  Service: Cardiology;  Laterality: Right;     Family History   Problem Relation Age of Onset    Arthritis Mother     Asthma Mother     Hypertension Father     Asthma Sister     Arthritis Maternal Grandmother     Asthma Maternal Grandmother     Diabetes Maternal Grandmother     Hypertension Maternal Grandmother     Arthritis " Maternal Grandfather     Hypertension Maternal Grandfather     Hypertension Paternal Grandfather     Breast cancer Paternal Grandmother     Down syndrome Brother     Gout Brother      Social History     Tobacco Use    Smoking status: Never    Smokeless tobacco: Never   Substance Use Topics    Alcohol use: Yes     Comment: holidays  rare    Drug use: No     Review of Systems    Physical Exam     Initial Vitals [12/11/23 1312]   BP Pulse Resp Temp SpO2   113/60 96 18 97.7 °F (36.5 °C) (!) 74 %      MAP       --         Physical Exam    Nursing note and vitals reviewed.  Constitutional: He appears well-developed. No distress.   HENT:   Head: Normocephalic and atraumatic.   Nose: Nose normal.   Eyes: Conjunctivae and EOM are normal. Pupils are equal, round, and reactive to light.   Neck: No JVD present.   Normal range of motion.  Cardiovascular:  Normal rate, regular rhythm and normal heart sounds.           Pulmonary/Chest: No respiratory distress. He has rales.   Abdominal: Abdomen is soft. He exhibits no distension. There is no abdominal tenderness.   Musculoskeletal:         General: Edema (bilateral LE) present. No tenderness. Normal range of motion.      Cervical back: Normal range of motion.     Neurological: He is alert and oriented to person, place, and time. He has normal strength. No cranial nerve deficit.   Skin: Skin is warm and dry. Capillary refill takes less than 2 seconds.         ED Course   Procedures  Labs Reviewed   CBC W/ AUTO DIFFERENTIAL - Abnormal; Notable for the following components:       Result Value    RBC 6.68 (*)     Hematocrit 55.5 (*)     MCH 24.9 (*)     MCHC 29.9 (*)     RDW 21.1 (*)     Gran % 76.9 (*)     Lymph % 13.1 (*)     All other components within normal limits    Narrative:     Release to patient->Immediate   COMPREHENSIVE METABOLIC PANEL - Abnormal; Notable for the following components:    Sodium 131 (*)     Potassium 3.1 (*)     Chloride 83 (*)     CO2 39 (*)     BUN 71 (*)      Creatinine 1.9 (*)     Albumin 2.8 (*)     Total Bilirubin 1.4 (*)     eGFR 43.0 (*)     All other components within normal limits    Narrative:     Release to patient->Immediate   B-TYPE NATRIURETIC PEPTIDE - Abnormal; Notable for the following components:     (*)     All other components within normal limits    Narrative:     Release to patient->Immediate   PROTIME-INR - Abnormal; Notable for the following components:    Prothrombin Time 13.3 (*)     INR 1.3 (*)     All other components within normal limits   ISTAT PROCEDURE - Abnormal; Notable for the following components:    POC PCO2 54.7 (*)     POC PO2 27 (*)     POC HCO3 31.4 (*)     POC BE 6 (*)     POC TCO2 33 (*)     All other components within normal limits   TROPONIN I    Narrative:     Release to patient->Immediate   LIPASE    Narrative:     Release to patient->Immediate   HIV 1 / 2 ANTIBODY    Narrative:     Release to patient->Immediate   HEPATITIS C ANTIBODY    Narrative:     Release to patient->Immediate   TROPONIN ISTAT   TROPONIN I   H. PYLORI ANTIBODY, IGG   H. PYLORI ANTIGEN, STOOL   POCT TROPONIN   POCT TROPONIN     EKG Readings: (Independently Interpreted)   Initial Reading: No STEMI. Previous EKG: Compared with most recent EKG Rhythm: Sinus Tachycardia. Heart Rate: 110. Ectopy: No Ectopy. Conduction: Normal. ST Segments: Non-Specific ST Segment Depression. Axis: Normal. Clinical Impression: Sinus Tachycardia     ECG Results              EKG 12-lead (Final result)  Result time 12/11/23 14:20:32      Final result by Interface, Lab In [facility] (12/11/23 14:20:32)                   Narrative:    Test Reason : R07.9,    Vent. Rate : 089 BPM     Atrial Rate : 089 BPM     P-R Int : 196 ms          QRS Dur : 110 ms      QT Int : 400 ms       P-R-T Axes : 074 111 258 degrees     QTc Int : 486 ms    Normal sinus rhythm  Right atrial enlargement  Right axis deviation  ST and T wave abnormality, consider inferior ischemia  ST and T wave  abnormality, consider anterolateral ischemia  Abnormal ECG  When compared with ECG of 02-MAY-2023 22:00,  Inferolateral T wave inversions and ST segment depressions now present  Confirmed by Isela SOTOMAYOR, Carmella ([id number]) on 12/11/2023 2:20:23 PM    Referred By: RAMANA   SELF           Confirmed By:Carmella Weiss MD                                  Imaging Results              X-Ray Chest AP Portable (Final result)  Result time 12/11/23 14:19:23   Procedure changed from X-Ray Chest PA And Lateral     Final result by Rubin Mayo MD (12/11/23 14:19:23)                   Impression:      As above      Electronically signed by: Rubin Mayo  Date:    12/11/2023  Time:    14:19               Narrative:    EXAMINATION:  XR CHEST AP PORTABLE    CLINICAL HISTORY:  Chest Pain;    TECHNIQUE:  Single frontal view of the chest was performed.    COMPARISON:  Standard view of chest from May 2, 2023 and after review of CT chest May 3, 2023    FINDINGS:  Reconfirmed and stable enlargement of cardiopericardial silhouette.  Reconfirmed pulmonary vascular congestion and bilateral interstitial and ground-glass opacities not felt significantly changed to above 2 exams and which could be on the basis of edema and or infection.  Clinical correlation.  No definite pleural fluid blunting right or left costophrenic sulci and no right or left pneumothorax.  No acute bony findings.  EKG leads superimposed chest and abdomen.                                    X-Rays:   Independently Interpreted Readings:   Chest X-Ray: Cardiomegaly present.  Increased vascular markings consistent with CHF are present.     Medications   allopurinoL tablet 300 mg (has no administration in time range)   metoprolol tartrate (LOPRESSOR) tablet 25 mg (has no administration in time range)   warfarin (COUMADIN) tablet 5 mg (5 mg Oral Given 12/11/23 1812)   sodium chloride 0.9% flush 10 mL (has no administration in time range)   naloxone 0.4 mg/mL injection 0.02 mg (has  no administration in time range)   glucose chewable tablet 16 g (has no administration in time range)   glucose chewable tablet 24 g (has no administration in time range)   glucagon (human recombinant) injection 1 mg (has no administration in time range)   dextrose 10% bolus 125 mL 125 mL (has no administration in time range)   dextrose 10% bolus 250 mL 250 mL (has no administration in time range)   furosemide injection 40 mg (has no administration in time range)   furosemide injection 100 mg (has no administration in time range)   fluticasone furoate-vilanteroL 100-25 mcg/dose diskus inhaler 1 puff (has no administration in time range)   tiotropium bromide 2.5 mcg/actuation inhaler 2 puff (has no administration in time range)   furosemide injection 20 mg (20 mg Intravenous Given 12/11/23 1634)     Medical Decision Making  48-year-old male, with history of heart failure, cor pulmonale, hypertension, Pickwickian syndrome, presents to the ED for abdominal bloating. Patient has mild respiratory distress, hypoxia while talking, required 6L NC increased from his regular 4L. Concerning for fluid overload on exam.   Ddx including but not limited to congestive heart failure exacerbation, ACS, pneumonia, doubt acute abdomen.    Amount and/or Complexity of Data Reviewed  Labs: ordered. Decision-making details documented in ED Course.  Radiology: ordered and independent interpretation performed. Decision-making details documented in ED Course.  ECG/medicine tests: ordered and independent interpretation performed. Decision-making details documented in ED Course.    Risk  Prescription drug management.  Decision regarding hospitalization.              Attending Attestation:   Physician Attestation Statement for Resident:  As the supervising MD   Physician Attestation Statement: I have personally seen and examined this patient.   I agree with the above history.  -:   As the supervising MD I agree with the above PE.     As the  supervising MD JAUREGUI agree with the above treatment, course, plan, and disposition.                    ED Course as of 12/11/23 1820   Mon Dec 11, 2023   1818 CBC auto differential(!)  No leukocytosis, or anemia [NC]   1818 Comprehensive metabolic panel(!)  Hyponatremia, hypokalemia, mild AARON [NC]   1819 B-Type natriuretic peptide (BNP)(!)  Elevated, concerning for fluid overload, will treat with IV Lasix [NC]   1819 Troponin I: 0.016  Within normal limits [NC]   1819 Discussed with Hospital Medicine, will admit patient for fluid overload, with respiratory failure and hypoxia, will require continued IV Lasix and caution with his AARON [NC]      ED Course User Index  [NC] Clyde Pradhan MD                           Clinical Impression:  Final diagnoses:  [R07.9] Chest pain  [J96.21] Acute on chronic respiratory failure with hypoxia (Primary)          ED Disposition Condition    Observation Stable                Clyde Pradhan MD  Resident  12/11/23 1820       Skye Jackman MD  12/13/23 1506    Critical care time spent on the evaluation and treatment of severe organ dysfunction, review of pertinent labs and imaging studies, discussions with consulting providers and discussions with patient/family: 35 minutes.         Skye Jackman MD  01/18/24 [id number]

## 2023-12-11 NOTE — SUBJECTIVE & OBJECTIVE
"Past Medical History:   Diagnosis Date    Arthritis     CHF (congestive heart failure)     Cor pulmonale 11/5/2012    Diastolic dysfunction     Gallstones     Hypertension     Left ventricular systolic dysfunction 11/5/2012    Morbid obesity 11/5/2012    Obesity     MALLIKA (obstructive sleep apnea)     PFO (patent foramen ovale) 11/5/2012    Pickwickian syndrome 11/5/2012    Pulmonary hypertension     Respiratory failure, acute-on-chronic 3/7/2014       Past Surgical History:   Procedure Laterality Date    RIGHT HEART CATHETERIZATION Right 3/22/2022    Procedure: INSERTION, CATHETER, RIGHT HEART;  Surgeon: Tony Martínez MD;  Location: [facility] CATH LAB;  Service: Cardiology;  Laterality: Right;       Review of patient's allergies indicates:   Allergen Reactions    Lipitor [atorvastatin] Other (See Comments)     "it makes my legs feel like jelly"  Other reaction(s): causes legs to hurt       No current facility-administered medications on file prior to encounter.     Current Outpatient Medications on File Prior to Encounter   Medication Sig    acetaminophen (TYLENOL ARTHRITIS ORAL) Take 2 tablets by mouth daily as needed (pain).    albuterol (VENTOLIN HFA) 90 mcg/actuation inhaler Inhale 2 puffs into the lungs every 4 (four) hours as needed for Wheezing. Rescue    allopurinoL (ZYLOPRIM) 300 MG tablet Take 1 tablet (300 mg total) by mouth once daily.    ascorbic acid (VITAMIN C ORAL) Take 1 tablet by mouth once daily.    b complex vitamins tablet Take 1 tablet by mouth once daily.    CALCIUM ORAL Take 1 capsule by mouth once daily.    furosemide (LASIX) 40 MG tablet Take 80 mg by mouth 2 (two) times daily.    metOLazone (ZAROXOLYN) 2.5 MG tablet TAKE 1 TABLET(2.5 MG) BY MOUTH 3 TIMES A WEEK (Patient taking differently: Take 2.5 mg by mouth twice a week. Wed & Sat)    metoprolol tartrate (LOPRESSOR) 25 MG tablet TAKE 1 TABLET BY MOUTH TWICE DAILY    multivit,calc,min/FA/K1/lycop (ONE-A-DAY MEN'S COMPLETE ORAL) Take 1 tablet " by mouth once daily.    omega-3 fatty acids/fish oil (FISH OIL-OMEGA-3 FATTY ACIDS) 300-1,000 mg capsule Take 1 capsule by mouth once daily.    pantoprazole (PROTONIX) 40 MG tablet Take 1 tablet (40 mg total) by mouth 2 (two) times daily. for 15 days    potassium chloride (MICRO-K) 10 MEQ CpSR TAKE 1 CAPSULE(10 MEQ) BY MOUTH EVERY DAY (Patient taking differently: 20 mEq. TAKE 1 CAPSULE(10 MEQ) BY MOUTH EVERY DAY)    predniSONE (DELTASONE) 20 MG tablet 2 pills po daily for 4 days, then 1 pill po day for 4 days    tiotropium (SPIRIVA) 18 mcg inhalation capsule Inhale 18 mcg into the lungs once daily.    triamcinolone acetonide (NASACORT ALLERGY NASL) 2 sprays by Nasal route once daily.    warfarin (COUMADIN) 10 MG tablet TAKE 1 TABLET (10 MG) EVERY SUNDAY AND HALF A TABLET (5 MG) ON ALL OTHER DAYS AS DIRECTED BY COUMADIN CLINIC    WIXELA INHUB 250-50 mcg/dose diskus inhaler 1 puff 2 (two) times daily. USE 1 INHALATION BY MOUTH TWICE DAILY    [DISCONTINUED] sildenafil (REVATIO) 20 mg Tab Take 1 tablet (20 mg total) by mouth 3 (three) times daily.     Family History       Problem Relation (Age of Onset)    Arthritis Mother, Maternal Grandmother, Maternal Grandfather    Asthma Mother, Sister, Maternal Grandmother    Breast cancer Paternal Grandmother    Diabetes Maternal Grandmother    Down syndrome Brother    Gout Brother    Hypertension Father, Maternal Grandmother, Maternal Grandfather, Paternal Grandfather          Tobacco Use    Smoking status: Never    Smokeless tobacco: Never   Substance and Sexual Activity    Alcohol use: Yes     Comment: holidays  rare    Drug use: No    Sexual activity: Not Currently     Partners: Female     Review of Systems   Constitutional:  Positive for activity change and fatigue. Negative for appetite change, chills and fever.   HENT:  Negative for sinus pain, sore throat and trouble swallowing.    Eyes:  Negative for pain and visual disturbance.   Respiratory:  Positive for cough and  shortness of breath. Negative for chest tightness and wheezing.    Cardiovascular:  Positive for palpitations and leg swelling. Negative for chest pain.   Gastrointestinal:  Positive for abdominal distention, abdominal pain (Near epigastric region) and nausea (Mildly). Negative for blood in stool, constipation, diarrhea and vomiting.   Genitourinary:  Negative for dysuria, flank pain and hematuria.   Skin:  Negative for color change.   Neurological:  Negative for dizziness, syncope, light-headedness and headaches.   Psychiatric/Behavioral:  Negative for behavioral problems and confusion.      Objective:     Vital Signs (Most Recent):  Temp: 97.7 °F (36.5 °C) (12/11/23 1312)  Pulse: 92 (12/11/23 1602)  Resp: 18 (12/11/23 1602)  BP: (!) 118/59 (12/11/23 1602)  SpO2: (!) 94 % (12/11/23 1602) Vital Signs (24h Range):  Temp:  [97.7 °F (36.5 °C)] 97.7 °F (36.5 °C)  Pulse:  [90-96] 92  Resp:  [18-19] 18  SpO2:  [74 %-99 %] 94 %  BP: (113-118)/(59-70) 118/59     Weight: 114.3 kg (252 lb)  Body mass index is 43.26 kg/m².     Physical Exam  Constitutional:       Appearance: Normal appearance. He is obese. He is not ill-appearing.   HENT:      Head: Normocephalic and atraumatic.      Nose: Nose normal. No congestion.      Mouth/Throat:      Mouth: Mucous membranes are moist.   Eyes:      Extraocular Movements: Extraocular movements intact.      Conjunctiva/sclera: Conjunctivae normal.   Cardiovascular:      Rate and Rhythm: Normal rate and regular rhythm.      Pulses: Normal pulses.      Heart sounds: Normal heart sounds. No murmur heard.     No friction rub.   Pulmonary:      Effort: Pulmonary effort is normal. No respiratory distress.      Breath sounds: Normal breath sounds. No wheezing.   Abdominal:      General: Bowel sounds are normal. There is distension.      Palpations: Abdomen is soft.      Tenderness: There is no abdominal tenderness.   Musculoskeletal:         General: No tenderness.      Right lower leg: Edema  present.      Left lower leg: Edema present.   Skin:     General: Skin is warm.      Capillary Refill: Capillary refill takes less than 2 seconds.   Neurological:      General: No focal deficit present.      Mental Status: He is alert and oriented to person, place, and time.   Psychiatric:         Mood and Affect: Mood normal.         Behavior: Behavior normal.                Significant Labs: All pertinent labs within the past 24 hours have been reviewed.    Significant Imaging: I have reviewed all pertinent imaging results/findings within the past 24 hours.

## 2023-12-12 PROBLEM — E66.01 SEVERE OBESITY (BMI >= 40): Status: ACTIVE | Noted: 2023-12-12

## 2023-12-12 LAB
ALBUMIN SERPL BCP-MCNC: 2.9 G/DL (ref 3.5–5.2)
ALP SERPL-CCNC: 117 U/L (ref 55–135)
ALT SERPL W/O P-5'-P-CCNC: 24 U/L (ref 10–44)
ANION GAP SERPL CALC-SCNC: 12 MMOL/L (ref 8–16)
AST SERPL-CCNC: 24 U/L (ref 10–40)
BASOPHILS # BLD AUTO: 0.05 K/UL (ref 0–0.2)
BASOPHILS NFR BLD: 0.6 % (ref 0–1.9)
BILIRUB SERPL-MCNC: 1.4 MG/DL (ref 0.1–1)
BUN SERPL-MCNC: 65 MG/DL (ref 6–20)
CALCIUM SERPL-MCNC: 9.8 MG/DL (ref 8.7–10.5)
CHLORIDE SERPL-SCNC: 87 MMOL/L (ref 95–110)
CO2 SERPL-SCNC: 38 MMOL/L (ref 23–29)
CREAT SERPL-MCNC: 1.8 MG/DL (ref 0.5–1.4)
DIFFERENTIAL METHOD: ABNORMAL
EOSINOPHIL # BLD AUTO: 0 K/UL (ref 0–0.5)
EOSINOPHIL NFR BLD: 0.3 % (ref 0–8)
ERYTHROCYTE [DISTWIDTH] IN BLOOD BY AUTOMATED COUNT: 21.2 % (ref 11.5–14.5)
EST. GFR  (NO RACE VARIABLE): 45.9 ML/MIN/1.73 M^2
GLUCOSE SERPL-MCNC: 95 MG/DL (ref 70–110)
H PYLORI IGG SERPL QL IA: NEGATIVE
HCT VFR BLD AUTO: 58.4 % (ref 40–54)
HGB BLD-MCNC: 17.2 G/DL (ref 14–18)
IMM GRANULOCYTES # BLD AUTO: 0.01 K/UL (ref 0–0.04)
IMM GRANULOCYTES NFR BLD AUTO: 0.1 % (ref 0–0.5)
INR PPP: 1.6 (ref 0.8–1.2)
LYMPHOCYTES # BLD AUTO: 1.6 K/UL (ref 1–4.8)
LYMPHOCYTES NFR BLD: 18.8 % (ref 18–48)
MAGNESIUM SERPL-MCNC: 1.8 MG/DL (ref 1.6–2.6)
MCH RBC QN AUTO: 25.7 PG (ref 27–31)
MCHC RBC AUTO-ENTMCNC: 29.5 G/DL (ref 32–36)
MCV RBC AUTO: 87 FL (ref 82–98)
MONOCYTES # BLD AUTO: 0.8 K/UL (ref 0.3–1)
MONOCYTES NFR BLD: 8.9 % (ref 4–15)
NEUTROPHILS # BLD AUTO: 6.2 K/UL (ref 1.8–7.7)
NEUTROPHILS NFR BLD: 71.3 % (ref 38–73)
NRBC BLD-RTO: 0 /100 WBC
PHOSPHATE SERPL-MCNC: 4 MG/DL (ref 2.7–4.5)
PLATELET # BLD AUTO: 228 K/UL (ref 150–450)
PMV BLD AUTO: 9.9 FL (ref 9.2–12.9)
POTASSIUM SERPL-SCNC: 3.8 MMOL/L (ref 3.5–5.1)
PROT SERPL-MCNC: 7.7 G/DL (ref 6–8.4)
PROTHROMBIN TIME: 16.3 SEC (ref 9–12.5)
RBC # BLD AUTO: 6.7 M/UL (ref 4.6–6.2)
SODIUM SERPL-SCNC: 137 MMOL/L (ref 136–145)
WBC # BLD AUTO: 8.68 K/UL (ref 3.9–12.7)

## 2023-12-12 PROCEDURE — 80053 COMPREHEN METABOLIC PANEL: CPT | Mod: HCNC

## 2023-12-12 PROCEDURE — 96376 TX/PRO/DX INJ SAME DRUG ADON: CPT

## 2023-12-12 PROCEDURE — 83735 ASSAY OF MAGNESIUM: CPT | Mod: HCNC

## 2023-12-12 PROCEDURE — 36415 COLL VENOUS BLD VENIPUNCTURE: CPT | Mod: HCNC

## 2023-12-12 PROCEDURE — 63600175 PHARM REV CODE 636 W HCPCS: Mod: HCNC

## 2023-12-12 PROCEDURE — 85025 COMPLETE CBC W/AUTO DIFF WBC: CPT | Mod: HCNC

## 2023-12-12 PROCEDURE — 25000242 PHARM REV CODE 250 ALT 637 W/ HCPCS: Mod: HCNC

## 2023-12-12 PROCEDURE — G0378 HOSPITAL OBSERVATION PER HR: HCPCS | Mod: HCNC

## 2023-12-12 PROCEDURE — 25000003 PHARM REV CODE 250: Mod: HCNC

## 2023-12-12 PROCEDURE — 27100171 HC OXYGEN HIGH FLOW UP TO 24 HOURS: Mod: HCNC

## 2023-12-12 PROCEDURE — 94761 N-INVAS EAR/PLS OXIMETRY MLT: CPT | Mod: HCNC

## 2023-12-12 PROCEDURE — 99900035 HC TECH TIME PER 15 MIN (STAT): Mod: HCNC

## 2023-12-12 PROCEDURE — 84100 ASSAY OF PHOSPHORUS: CPT | Mod: HCNC

## 2023-12-12 PROCEDURE — 27000190 HC CPAP FULL FACE MASK W/VALVE: Mod: HCNC

## 2023-12-12 PROCEDURE — 85610 PROTHROMBIN TIME: CPT | Mod: HCNC

## 2023-12-12 PROCEDURE — 94660 CPAP INITIATION&MGMT: CPT | Mod: HCNC

## 2023-12-12 RX ORDER — POLYETHYLENE GLYCOL 3350 17 G/17G
17 POWDER, FOR SOLUTION ORAL DAILY
Status: DISCONTINUED | OUTPATIENT
Start: 2023-12-13 | End: 2023-12-15 | Stop reason: HOSPADM

## 2023-12-12 RX ORDER — FUROSEMIDE 10 MG/ML
60 INJECTION INTRAMUSCULAR; INTRAVENOUS
Status: DISCONTINUED | OUTPATIENT
Start: 2023-12-12 | End: 2023-12-14

## 2023-12-12 RX ADMIN — ALLOPURINOL 300 MG: 300 TABLET ORAL at 09:12

## 2023-12-12 RX ADMIN — WARFARIN SODIUM 5 MG: 5 TABLET ORAL at 05:12

## 2023-12-12 RX ADMIN — FUROSEMIDE 60 MG: 10 INJECTION, SOLUTION INTRAMUSCULAR; INTRAVENOUS at 10:12

## 2023-12-12 RX ADMIN — METOPROLOL TARTRATE 25 MG: 25 TABLET, FILM COATED ORAL at 09:12

## 2023-12-12 RX ADMIN — METOPROLOL TARTRATE 25 MG: 25 TABLET, FILM COATED ORAL at 10:12

## 2023-12-12 RX ADMIN — TIOTROPIUM BROMIDE INHALATION SPRAY 2 PUFF: 3.12 SPRAY, METERED RESPIRATORY (INHALATION) at 10:12

## 2023-12-12 RX ADMIN — FUROSEMIDE 60 MG: 10 INJECTION, SOLUTION INTRAMUSCULAR; INTRAVENOUS at 09:12

## 2023-12-12 RX ADMIN — PANTOPRAZOLE SODIUM 40 MG: 40 TABLET, DELAYED RELEASE ORAL at 09:12

## 2023-12-12 RX ADMIN — FLUTICASONE FUROATE AND VILANTEROL TRIFENATATE 1 PUFF: 100; 25 POWDER RESPIRATORY (INHALATION) at 10:12

## 2023-12-12 NOTE — SUBJECTIVE & OBJECTIVE
"Interval History:   NAEON. Patient reports he slept well. 1.4 L of urine output documented. Plan to titrate patient oxygen requirements today.         Past Medical History:   Diagnosis Date    Arthritis     CHF (congestive heart failure)     Diastolic dysfunction    Cor pulmonale 11/05/2012    Gallstones     Hypertension     Morbid obesity 11/05/2012    Obesity hypoventilation syndrome     On home oxygen therapy 03/07/2014    MALLIKA (obstructive sleep apnea)     BPAP 16/11 with 3 L at night (continuous O2).    Paroxysmal atrial fibrillation     PFO (patent foramen ovale) 11/05/2012    status post closure    Pickwickian syndrome 11/05/2012    Pulmonary hypertension        Past Surgical History:   Procedure Laterality Date    RIGHT HEART CATHETERIZATION Right 3/22/2022    Procedure: INSERTION, CATHETER, RIGHT HEART;  Surgeon: Tony Martínez MD;  Location: [facility] CATH LAB;  Service: Cardiology;  Laterality: Right;       Review of patient's allergies indicates:   Allergen Reactions    Lipitor [atorvastatin] Other (See Comments)     "it makes my legs feel like jelly"  Other reaction(s): causes legs to hurt       No current facility-administered medications on file prior to encounter.     Current Outpatient Medications on File Prior to Encounter   Medication Sig    acetaminophen (TYLENOL ARTHRITIS ORAL) Take 2 tablets by mouth daily as needed (pain).    albuterol (VENTOLIN HFA) 90 mcg/actuation inhaler Inhale 2 puffs into the lungs every 4 (four) hours as needed for Wheezing. Rescue    allopurinoL (ZYLOPRIM) 300 MG tablet Take 1 tablet (300 mg total) by mouth once daily.    ascorbic acid (VITAMIN C ORAL) Take 1 tablet by mouth once daily.    b complex vitamins tablet Take 1 tablet by mouth once daily.    CALCIUM ORAL Take 1 capsule by mouth once daily.    furosemide (LASIX) 40 MG tablet Take 80 mg by mouth 2 (two) times daily.    metOLazone (ZAROXOLYN) 2.5 MG tablet TAKE 1 TABLET(2.5 MG) BY MOUTH 3 TIMES A WEEK (Patient taking " differently: Take 2.5 mg by mouth twice a week. Wed & Sat)    metoprolol tartrate (LOPRESSOR) 25 MG tablet TAKE 1 TABLET BY MOUTH TWICE DAILY    multivit,calc,min/FA/K1/lycop (ONE-A-DAY MEN'S COMPLETE ORAL) Take 1 tablet by mouth once daily.    omega-3 fatty acids/fish oil (FISH OIL-OMEGA-3 FATTY ACIDS) 300-1,000 mg capsule Take 1 capsule by mouth once daily.    pantoprazole (PROTONIX) 40 MG tablet Take 1 tablet (40 mg total) by mouth 2 (two) times daily. for 15 days    potassium chloride (MICRO-K) 10 MEQ CpSR TAKE 1 CAPSULE(10 MEQ) BY MOUTH EVERY DAY (Patient taking differently: 20 mEq. TAKE 1 CAPSULE(10 MEQ) BY MOUTH EVERY DAY)    predniSONE (DELTASONE) 20 MG tablet 2 pills po daily for 4 days, then 1 pill po day for 4 days    tiotropium (SPIRIVA) 18 mcg inhalation capsule Inhale 18 mcg into the lungs once daily.    triamcinolone acetonide (NASACORT ALLERGY NASL) 2 sprays by Nasal route once daily.    warfarin (COUMADIN) 10 MG tablet TAKE 1 TABLET (10 MG) EVERY SUNDAY AND HALF A TABLET (5 MG) ON ALL OTHER DAYS AS DIRECTED BY COUMADIN CLINIC    WIXELA INHUB 250-50 mcg/dose diskus inhaler 1 puff 2 (two) times daily. USE 1 INHALATION BY MOUTH TWICE DAILY    [DISCONTINUED] sildenafil (REVATIO) 20 mg Tab Take 1 tablet (20 mg total) by mouth 3 (three) times daily.     Family History       Problem Relation (Age of Onset)    Arthritis Mother, Maternal Grandmother, Maternal Grandfather    Asthma Mother, Sister, Maternal Grandmother    Breast cancer Paternal Grandmother    Diabetes Maternal Grandmother    Down syndrome Brother    Gout Brother    Hypertension Father, Maternal Grandmother, Maternal Grandfather, Paternal Grandfather          Tobacco Use    Smoking status: Never    Smokeless tobacco: Never   Substance and Sexual Activity    Alcohol use: Yes     Comment: holidays  rare    Drug use: No    Sexual activity: Not Currently     Partners: Female     Review of Systems   Constitutional:  Positive for activity change and  fatigue. Negative for appetite change, chills and fever.   HENT:  Negative for sinus pain, sore throat and trouble swallowing.    Eyes:  Negative for pain and visual disturbance.   Respiratory:  Positive for cough and shortness of breath. Negative for chest tightness and wheezing.    Cardiovascular:  Positive for palpitations and leg swelling. Negative for chest pain.   Gastrointestinal:  Positive for abdominal distention, abdominal pain (Near epigastric region) and nausea (Mildly). Negative for blood in stool, constipation, diarrhea and vomiting.   Genitourinary:  Negative for dysuria, flank pain and hematuria.   Skin:  Negative for color change.   Neurological:  Negative for dizziness, syncope, light-headedness and headaches.   Psychiatric/Behavioral:  Negative for behavioral problems and confusion.      Objective:     Vital Signs (Most Recent):  Temp: 97.3 °F (36.3 °C) (12/12/23 1044)  Pulse: 87 (12/12/23 1058)  Resp: 16 (12/12/23 1044)  BP: 109/62 (12/12/23 1044)  SpO2: (!) 94 % (12/12/23 1044) Vital Signs (24h Range):  Temp:  [96 °F (35.6 °C)-98 °F (36.7 °C)] 97.3 °F (36.3 °C)  Pulse:  [84-99] 87  Resp:  [16-20] 16  SpO2:  [84 %-96 %] 94 %  BP: ()/(59-73) 109/62     Weight: 117.5 kg (259 lb 0.7 oz)  Body mass index is 44.44 kg/m².     Physical Exam  Constitutional:       Appearance: Normal appearance. He is obese. He is not ill-appearing.   HENT:      Head: Normocephalic and atraumatic.      Nose: Nose normal. No congestion.      Mouth/Throat:      Mouth: Mucous membranes are moist.   Eyes:      Extraocular Movements: Extraocular movements intact.      Conjunctiva/sclera: Conjunctivae normal.   Cardiovascular:      Rate and Rhythm: Normal rate and regular rhythm.      Pulses: Normal pulses.      Heart sounds: Normal heart sounds. No murmur heard.     No friction rub.   Pulmonary:      Effort: Pulmonary effort is normal. No respiratory distress.      Breath sounds: Normal breath sounds. Decreased air  movement present. No wheezing.   Abdominal:      General: Bowel sounds are normal. There is distension.      Palpations: Abdomen is soft.      Tenderness: There is no abdominal tenderness.   Musculoskeletal:         General: No tenderness.      Right lower leg: Edema present.      Left lower leg: Edema present.   Skin:     General: Skin is warm.      Capillary Refill: Capillary refill takes less than 2 seconds.   Neurological:      General: No focal deficit present.      Mental Status: He is alert and oriented to person, place, and time.   Psychiatric:         Mood and Affect: Mood normal.         Behavior: Behavior normal.                Significant Labs: All pertinent labs within the past 24 hours have been reviewed.    Significant Imaging: I have reviewed all pertinent imaging results/findings within the past 24 hours.

## 2023-12-12 NOTE — ASSESSMENT & PLAN NOTE
"Patient with Hypoxic Respiratory failure which is Acute on chronic.  he is on home oxygen at 3 LPM. Supplemental oxygen was provided and noted- 8 LPM on admission for SOB      .   Signs/symptoms of respiratory failure include- increased work of breathing. Contributing diagnoses includes - CHF and Obesity Hypoventilation Labs and images were reviewed. Patient Has not had a recent ABG. Will treat underlying causes and adjust management of respiratory failure as follows-     CXR in the ED revealed " Reconfirmed pulmonary vascular congestion and bilateral interstitial and ground-glass opacities not felt significantly changed to above 2 exams and which could be on the basis of edema and or infection". Patient BNP elevated. Patient PCO2 (but chronically elevated).    PLAN:   - Lasix 40 mg IV dose today, in addition to earlier home dose of 80 mg and one time dose of 20 mg IV in ED  - Lasix 100 mg IV starting tomorrow   - Strict I's and O's  - CBC, CMP   - BIPAP QHS in setting of chronic respiratory failure and Kevin  - Continuous oxygen monitoring   - Continue home maintenance inhalers  "

## 2023-12-12 NOTE — ASSESSMENT & PLAN NOTE
Creatine stable for now. BMP reviewed- noted Estimated Creatinine Clearance: 54.6 mL/min (A) (based on SCr of 1.9 mg/dL (H)). according to latest data. Based on current GFR, CKD stage is stage 3 - GFR 30-59.  Monitor UOP and serial BMP and adjust therapy as needed. Renally dose meds. Avoid nephrotoxic medications and procedures.

## 2023-12-12 NOTE — ASSESSMENT & PLAN NOTE
"Patient has prior diagnosis of A fib. Patient reports he was told at one point his blood has tendency to "pool". Discussed with patient home medication regimen. Patient reports taking coumadin and being followed by coumadin clinic     PLAN:   - Continue patient Coumadin 5 mg adjust as needed   - Daily PT-INR   - Continuous tele monitoring   - Continue Lopressor       "

## 2023-12-12 NOTE — ASSESSMENT & PLAN NOTE
Patient reports chronic diagnosis of MALLIKA. Recently had seen Elba Maloney NP in sleep medicine clinic on 11/1/23. Patient has been on 16/11 BIPAP settings with no concern for titration. Sleep clinic recommending continuing with current settings.    PLAN:  - BIPAP QHS with 16/11 settings

## 2023-12-12 NOTE — H&P
"[facility] - Emergency Dept  [facility] Medicine  History & Physical    Patient Name: Yong Mcintyre  MRN: [id number]  Patient Class: OP- Observation  Admission Date: 12/11/2023  Attending Physician: Bryan Degroot MD   Primary Care Provider: Gianni Escalona MD         Patient information was obtained from patient and ER records.     Subjective:     Principal Problem:Acute on chronic respiratory failure with hypoxia and hypercapnia    Chief Complaint:   Chief Complaint   Patient presents with    Abdominal Pain     Pt arrives from home c/o abd pain; reports feeling bloated recently; arrive on 4L NC, usually on 3        HPI: Mr. Mcintyre is a 49 y/o male with a PMH of HFrEF, HTN, pHTN, Chronic hypoxic respiratory failure (baseline oxygen req at home is 3 L), PFO (unsuccessful closure in 2006), AF (on coumadin, pt reports his "blood pools" that's why he's on it), Obesity hypoventilation syndrome, Morbid obesity presenting to the ED due to epigastric bloating that has been making feel SOB. Patient reports that this is a chronic issue that has been ongoing since February/March when he had Covid. Patient reports a few weeks or maybe a month later he has been having this epigastric bloating and associated belching that has been reoccurring. Patient reports that he visited his PCP, and suspected a role of GERD and prescribed him Protonix with little improvement at first. But when he ran out of his medication he never bothered to refill it because he felt like it never resolved his symptoms. Patient suspected it might've been 2/2 to BIPAP machine and "cleaned it thoroughly" and it resolved his symptoms mildly. Patient reports no specific association with any activity. He reports that when he eats the symptoms come on and bother him. He reports no particular change in his diet, was eating comfort foods a few months ago but transitioned his diet slowly to include more greens. Patient denies fever, N/V/D, no changes in BM, no " "increasing passing of gas, no chills, dysuria, flank pain, headaches, and orthopnea. Patient does admit to epigastric bloating, SOB (seems to be chronic but worsens with bloating), belching, and feeling mildly nauseous. No inciting event noted. Patient has been adherent to medication regimen consisting diuretics. No new sick contacts. Performs ADL's and works out.    Upon presentation to the ED, patient was noted to be in volume overloaded state. With increased oxygen requirements from baseline. Now on 8 L NC. Patient also noted to have b/t LE edema (patient reports more than usual). CXR in the ED revealed "  Reconfirmed pulmonary vascular congestion and bilateral interstitial and ground-glass opacities not felt significantly changed to above 2 exams and which could be on the basis of edema and or infection". Labs in the ED notable for elevated  (last checked it was 31 in May), Trop was 0.016 (neg), Na 131, K 3.1, CO2 39, Cr 1.9 (bl 1.4-1.5). Patient is HDS and afebrile.     Past Medical History:   Diagnosis Date    Arthritis     CHF (congestive heart failure)     Cor pulmonale 11/5/2012    Diastolic dysfunction     Gallstones     Hypertension     Left ventricular systolic dysfunction 11/5/2012    Morbid obesity 11/5/2012    Obesity     MALLIKA (obstructive sleep apnea)     PFO (patent foramen ovale) 11/5/2012    Pickwickian syndrome 11/5/2012    Pulmonary hypertension     Respiratory failure, acute-on-chronic 3/7/2014       Past Surgical History:   Procedure Laterality Date    RIGHT HEART CATHETERIZATION Right 3/22/2022    Procedure: INSERTION, CATHETER, RIGHT HEART;  Surgeon: Tony Martínez MD;  Location: [facility] CATH LAB;  Service: Cardiology;  Laterality: Right;       Review of patient's allergies indicates:   Allergen Reactions    Lipitor [atorvastatin] Other (See Comments)     "it makes my legs feel like jelly"  Other reaction(s): causes legs to hurt       No current facility-administered medications on file " prior to encounter.     Current Outpatient Medications on File Prior to Encounter   Medication Sig    acetaminophen (TYLENOL ARTHRITIS ORAL) Take 2 tablets by mouth daily as needed (pain).    albuterol (VENTOLIN HFA) 90 mcg/actuation inhaler Inhale 2 puffs into the lungs every 4 (four) hours as needed for Wheezing. Rescue    allopurinoL (ZYLOPRIM) 300 MG tablet Take 1 tablet (300 mg total) by mouth once daily.    ascorbic acid (VITAMIN C ORAL) Take 1 tablet by mouth once daily.    b complex vitamins tablet Take 1 tablet by mouth once daily.    CALCIUM ORAL Take 1 capsule by mouth once daily.    furosemide (LASIX) 40 MG tablet Take 80 mg by mouth 2 (two) times daily.    metOLazone (ZAROXOLYN) 2.5 MG tablet TAKE 1 TABLET(2.5 MG) BY MOUTH 3 TIMES A WEEK (Patient taking differently: Take 2.5 mg by mouth twice a week. Wed & Sat)    metoprolol tartrate (LOPRESSOR) 25 MG tablet TAKE 1 TABLET BY MOUTH TWICE DAILY    multivit,calc,min/FA/K1/lycop (ONE-A-DAY MEN'S COMPLETE ORAL) Take 1 tablet by mouth once daily.    omega-3 fatty acids/fish oil (FISH OIL-OMEGA-3 FATTY ACIDS) 300-1,000 mg capsule Take 1 capsule by mouth once daily.    pantoprazole (PROTONIX) 40 MG tablet Take 1 tablet (40 mg total) by mouth 2 (two) times daily. for 15 days    potassium chloride (MICRO-K) 10 MEQ CpSR TAKE 1 CAPSULE(10 MEQ) BY MOUTH EVERY DAY (Patient taking differently: 20 mEq. TAKE 1 CAPSULE(10 MEQ) BY MOUTH EVERY DAY)    predniSONE (DELTASONE) 20 MG tablet 2 pills po daily for 4 days, then 1 pill po day for 4 days    tiotropium (SPIRIVA) 18 mcg inhalation capsule Inhale 18 mcg into the lungs once daily.    triamcinolone acetonide (NASACORT ALLERGY NASL) 2 sprays by Nasal route once daily.    warfarin (COUMADIN) 10 MG tablet TAKE 1 TABLET (10 MG) EVERY SUNDAY AND HALF A TABLET (5 MG) ON ALL OTHER DAYS AS DIRECTED BY COUMADIN CLINIC    WIXELA INHUB 250-50 mcg/dose diskus inhaler 1 puff 2 (two) times daily. USE 1 INHALATION BY MOUTH TWICE DAILY     [DISCONTINUED] sildenafil (REVATIO) 20 mg Tab Take 1 tablet (20 mg total) by mouth 3 (three) times daily.     Family History       Problem Relation (Age of Onset)    Arthritis Mother, Maternal Grandmother, Maternal Grandfather    Asthma Mother, Sister, Maternal Grandmother    Breast cancer Paternal Grandmother    Diabetes Maternal Grandmother    Down syndrome Brother    Gout Brother    Hypertension Father, Maternal Grandmother, Maternal Grandfather, Paternal Grandfather          Tobacco Use    Smoking status: Never    Smokeless tobacco: Never   Substance and Sexual Activity    Alcohol use: Yes     Comment: holidays  rare    Drug use: No    Sexual activity: Not Currently     Partners: Female     Review of Systems   Constitutional:  Positive for activity change and fatigue. Negative for appetite change, chills and fever.   HENT:  Negative for sinus pain, sore throat and trouble swallowing.    Eyes:  Negative for pain and visual disturbance.   Respiratory:  Positive for cough and shortness of breath. Negative for chest tightness and wheezing.    Cardiovascular:  Positive for palpitations and leg swelling. Negative for chest pain.   Gastrointestinal:  Positive for abdominal distention, abdominal pain (Near epigastric region) and nausea (Mildly). Negative for blood in stool, constipation, diarrhea and vomiting.   Genitourinary:  Negative for dysuria, flank pain and hematuria.   Skin:  Negative for color change.   Neurological:  Negative for dizziness, syncope, light-headedness and headaches.   Psychiatric/Behavioral:  Negative for behavioral problems and confusion.      Objective:     Vital Signs (Most Recent):  Temp: 97.7 °F (36.5 °C) (12/11/23 1312)  Pulse: 92 (12/11/23 1602)  Resp: 18 (12/11/23 1602)  BP: (!) 118/59 (12/11/23 1602)  SpO2: (!) 94 % (12/11/23 1602) Vital Signs (24h Range):  Temp:  [97.7 °F (36.5 °C)] 97.7 °F (36.5 °C)  Pulse:  [90-96] 92  Resp:  [18-19] 18  SpO2:  [74 %-99 %] 94 %  BP:  (113-118)/(59-70) 118/59     Weight: 114.3 kg (252 lb)  Body mass index is 43.26 kg/m².     Physical Exam  Constitutional:       Appearance: Normal appearance. He is obese. He is not ill-appearing.   HENT:      Head: Normocephalic and atraumatic.      Nose: Nose normal. No congestion.      Mouth/Throat:      Mouth: Mucous membranes are moist.   Eyes:      Extraocular Movements: Extraocular movements intact.      Conjunctiva/sclera: Conjunctivae normal.   Cardiovascular:      Rate and Rhythm: Normal rate and regular rhythm.      Pulses: Normal pulses.      Heart sounds: Normal heart sounds. No murmur heard.     No friction rub.   Pulmonary:      Effort: Pulmonary effort is normal. No respiratory distress.      Breath sounds: Normal breath sounds. No wheezing.   Abdominal:      General: Bowel sounds are normal. There is distension.      Palpations: Abdomen is soft.      Tenderness: There is no abdominal tenderness.   Musculoskeletal:         General: No tenderness.      Right lower leg: Edema present.      Left lower leg: Edema present.   Skin:     General: Skin is warm.      Capillary Refill: Capillary refill takes less than 2 seconds.   Neurological:      General: No focal deficit present.      Mental Status: He is alert and oriented to person, place, and time.   Psychiatric:         Mood and Affect: Mood normal.         Behavior: Behavior normal.                Significant Labs: All pertinent labs within the past 24 hours have been reviewed.    Significant Imaging: I have reviewed all pertinent imaging results/findings within the past 24 hours.  Assessment/Plan:     * Acute on chronic respiratory failure with hypoxia and hypercapnia  Patient with Hypoxic Respiratory failure which is Acute on chronic.  he is on home oxygen at 3 LPM. Supplemental oxygen was provided and noted- 8 LPM on admission for SOB      .   Signs/symptoms of respiratory failure include- increased work of breathing. Contributing diagnoses  "includes - CHF and Obesity Hypoventilation Labs and images were reviewed. Patient Has not had a recent ABG. Will treat underlying causes and adjust management of respiratory failure as follows-     CXR in the ED revealed " Reconfirmed pulmonary vascular congestion and bilateral interstitial and ground-glass opacities not felt significantly changed to above 2 exams and which could be on the basis of edema and or infection". Patient BNP elevated. Patient PCO2 (but chronically elevated).    PLAN:   - Lasix 40 mg IV dose today, in addition to earlier home dose of 80 mg and one time dose of 20 mg IV in ED  - Lasix 100 mg IV starting tomorrow   - Strict I's and O's  - CBC, CMP   - BIPAP QHS in setting of chronic respiratory failure and Kevin  - Continuous oxygen monitoring   - Continue home maintenance inhalers    Epigastric abdominal pain  Patient reports chronic epigastric pain/bloating since last February. Patient reports that he has seen his PCP who started him on Protonix which helped relieve his symptoms mildly. Patient reports that no particular activity triggers the bloating. Patient related it sometimes to food that he has consumed but he reports that is less likely because his diet has changes. Patient has regular BM's, no increase in flatulence, and no burning sensation. Patient does report an associated belching. He does also say primary reason he has come to the ED was to have this issue worked up because it has been bothering him.     PLAN:   - Protonix 40 mg daily   - prn compazine for nausea   - Consider imaging if patient reports worsening pain/nausea. Physical exam is benign   - F/u with H pylori IgG antibody, and H pylori stool antigen       Chronic asthmatic bronchitis  Chronic and patient reports being on inhaler regimen at home. Some not on formulary.    PLAN:   - Continue home inhaler alternatives     CKD (chronic kidney disease), stage III  Creatine stable for now. BMP reviewed- noted Estimated " "Creatinine Clearance: 54.6 mL/min (A) (based on SCr of 1.9 mg/dL (H)). according to latest data. Based on current GFR, CKD stage is stage 3 - GFR 30-59.  Monitor UOP and serial BMP and adjust therapy as needed. Renally dose meds. Avoid nephrotoxic medications and procedures.        Acute on chronic heart failure with preserved ejection fraction (HFpEF)  Patient is identified as having Systolic (HFrEF) heart failure that is Acute on chronic. CHF is currently uncontrolled due to Continued edema of extremities. Latest ECHO performed and demonstrates- Results for orders placed during the hospital encounter of 05/02/23    Echo    Interpretation Summary  · The left ventricle is normal in size with mildly decreased systolic function.  · The estimated ejection fraction is 50%.  · There are segmental left ventricular wall motion abnormalities.  · There is abnormal septal wall motion.  · Left ventricular diastolic dysfunction.  · Moderate right ventricular enlargement with mildly reduced right ventricular systolic function.  · Mild right atrial enlargement.  · Normal central venous pressure (3 mmHg).  . Continue Beta Blocker and Furosemide and monitor clinical status closely. Monitor on telemetry. Patient is off CHF pathway.  Monitor strict Is&Os and daily weights.  Place on fluid restriction of 1.5 L. Cardiology has not been consulted. Continue to stress to patient importance of self efficacy and  on diet for CHF. Last BNP reviewed- and noted below   Recent Labs   Lab 12/11/23  1339   *       PLAN:   - Lasix 100 mg IV starting tomorrow   - Strict I's and O's   - daily cbc, cmp   - Cardiac diet   - Lasix 40 mg IV today     Hypercoagulability due to atrial fibrillation  Patient has prior diagnosis of A fib. Patient reports he was told at one point his blood has tendency to "pool". Discussed with patient home medication regimen. Patient reports taking coumadin and being followed by coumadin clinic     PLAN:   - " Continue patient Coumadin 5 mg adjust as needed   - Daily PT-INR   - Continuous tele monitoring   - Continue Lopressor         Hypertension  Chronic, controlled. Latest blood pressure and vitals reviewed-     Temp:  [97.7 °F (36.5 °C)-97.9 °F (36.6 °C)]   Pulse:  [85-96]   Resp:  [18-19]   BP: (102-118)/(59-70)   SpO2:  [74 %-99 %] .   Home meds for hypertension were reviewed and noted below.   Hypertension Medications               furosemide (LASIX) 40 MG tablet Take 80 mg by mouth 2 (two) times daily.    metOLazone (ZAROXOLYN) 2.5 MG tablet TAKE 1 TABLET(2.5 MG) BY MOUTH 3 TIMES A WEEK    metoprolol tartrate (LOPRESSOR) 25 MG tablet TAKE 1 TABLET BY MOUTH TWICE DAILY            While in the hospital, will manage blood pressure as follows; Adjust home antihypertensive regimen as follows- Lasix (IV) and lopressor     Will utilize p.r.n. blood pressure medication only if patient's blood pressure greater than 180/110 and he develops symptoms such as worsening chest pain or shortness of breath.    MALLIKA (obstructive sleep apnea)  Patient reports chronic diagnosis of MALLIKA. Recently had seen Elba Maloney NP in sleep medicine clinic on 11/1/23. Patient has been on 16/11 BIPAP settings with no concern for titration. Sleep clinic recommending continuing with current settings.    PLAN:  - BIPAP QHS with 16/11 settings         VTE Risk Mitigation (From admission, onward)           Ordered     warfarin (COUMADIN) tablet 5 mg  Daily         12/11/23 1721                           On 12/11/2023, patient should be placed in hospital observation services under my care in collaboration with Dr. Degroot.         Veronica Gong DO  Department of Hospital Medicine  [facility] - Emergency Dept

## 2023-12-12 NOTE — NURSING
.Nurses Note -- 4 Eyes      12/12/2023   5:29 AM      Skin assessed during: Admit      [x] No Altered Skin Integrity Present    []Prevention Measures Documented      [] Yes- Altered Skin Integrity Present or Discovered   [] LDA Added if Not in Epic (Describe Wound)   [] New Altered Skin Integrity was Present on Admit and Documented in LDA   [] Wound Image Taken    Wound Care Consulted? No    Attending Nurse:  Gali Natarajan RN/Staff Member:  Ryan

## 2023-12-12 NOTE — PLAN OF CARE
[facility] - Med Surg  Initial Discharge Assessment       Primary Care Provider: Gianni Escalona MD    Admission Diagnosis: Chest pain [R07.9]  Acute on chronic respiratory failure with hypoxia [J96.21]    Admission Date: 12/11/2023  Expected Discharge Date: 12/15/2023    Transition of Care Barriers: (P) None    Payor: HUMANA MANAGED MEDICARE / Plan: HUMANA TOTAL CARE ADVANTAGE / Product Type: Medicare Advantage /     Extended Emergency Contact Information  Primary Emergency Contact: Alphonse Mcintyre  Address: [address] [place] [address] [facility] [place]  Mobile Phone: [phone]  Relation: Father  Secondary Emergency Contact: Bernardino Mcintyre  Address: [address] [facility] [address] [facility] [place]  Mobile Phone: [phone]  Relation: Mother    Discharge Plan A: (P) Home  Discharge Plan B: (P) Home      [id number] - [place], [place] - [address] [place] [address] AT [facility]  [address] [place] [id number]  Phone: [phone] Fax: [phone]    [facility] [place], [place] - [address] [facility]  [address] [facility] [address]  Phone: [phone] Fax: [phone]    [facility]  [address] [facility] [address]  Phone: [phone] Fax: [phone]    [facility]  [address] [facility] [address]  Phone: [phone] Fax: [phone]      CM met with patient to discuss discharge planning. Patient lives home alone in a single story home with four steps to enter. Patient also states that prior to hospital admission he was independent with ADLs and required oxygen at home. Will continue to follow for post acute needs.   Discharge Plan A and Plan B have been determined by review of patient's clinical status, future medical and therapeutic needs, and coverage/benefits for post-acute care in coordination with multidisciplinary team  members.    Initial Assessment (most recent)       Adult Discharge Assessment - 12/12/23 1013          Discharge Assessment    Assessment Type Discharge Planning Assessment     Confirmed/corrected address, phone number and insurance Yes     Confirmed Demographics Correct on Facesheet     Source of Information patient     Communicated ESTEBAN with patient/caregiver Date not available/Unable to determine     Reason For Admission Acute on chronic hypoxia and hypercapnia     People in Home alone     Do you expect to return to your current living situation? Yes     Do you have help at home or someone to help you manage your care at home? No     Prior to hospitilization cognitive status: Alert/Oriented     Current cognitive status: Alert/Oriented     Walking or Climbing Stairs Difficulty no (P)      Dressing/Bathing Difficulty no (P)      Home Accessibility wheelchair accessible (P)      Home Layout Able to live on 1st floor (P)      Equipment Currently Used at Home none (P)      Readmission within 30 days? No (P)      Patient currently being followed by outpatient case management? No (P)      Do you currently have service(s) that help you manage your care at home? No (P)      Do you take prescription medications? Yes (P)      Do you have prescription coverage? Yes (P)      Coverage [employer] (P)      Do you have any problems affording any of your prescribed medications? No (P)      Is the patient taking medications as prescribed? yes (P)      How do you get to doctors appointments? car, drives self (P)      Are you on dialysis? No (P)      Do you take coumadin? No (P)      Discharge Plan A Home (P)      Discharge Plan B Home (P)      DME Needed Upon Discharge  none (P)      Discharge Plan discussed with: Patient (P)      Transition of Care Barriers None (P)         Physical Activity    On average, how many days per week do you engage in moderate to strenuous exercise (like a brisk walk)? 0 days (P)      On average, how many  minutes do you engage in exercise at this level? 0 min (P)         Financial Resource Strain    How hard is it for you to pay for the very basics like food, housing, medical care, and heating? Not hard at all (P)         Housing Stability    In the last 12 months, was there a time when you were not able to pay the mortgage or rent on time? No (P)      In the last 12 months, how many places have you lived? 1 (P)         Transportation Needs    In the past 12 months, has lack of transportation kept you from medical appointments or from getting medications? No (P)      In the past 12 months, has lack of transportation kept you from meetings, work, or from getting things needed for daily living? No (P)         Food Insecurity    Within the past 12 months, you worried that your food would run out before you got the money to buy more. Never true (P)      Within the past 12 months, the food you bought just didn't last and you didn't have money to get more. Never true (P)         Stress    Do you feel stress - tense, restless, nervous, or anxious, or unable to sleep at night because your mind is troubled all the time - these days? Only a little (P)         Social Connections    In a typical week, how many times do you talk on the phone with family, friends, or neighbors? More than three times a week (P)      How often do you get together with friends or relatives? Once a week (P)      How often do you attend Protestant or Confucianist services? Never (P)      Do you belong to any clubs or organizations such as Protestant groups, unions, fraternal or athletic groups, or school groups? No (P)      How often do you attend meetings of the clubs or organizations you belong to? Never (P)      Are you , , , , never , or living with a partner? Never  (P)         Alcohol Use    Q1: How often do you have a drink containing alcohol? Never (P)      Q2: How many drinks containing alcohol do you have on  a typical day when you are drinking? Patient does not drink (P)      Q3: How often do you have six or more drinks on one occasion? Never (P)                         JAIME Calderón  Case Management  [phone]

## 2023-12-12 NOTE — ASSESSMENT & PLAN NOTE
Body mass index is 44.44 kg/m². Morbid obesity complicates all aspects of disease management from diagnostic modalities to treatment. Weight loss encouraged and health benefits explained to patient.

## 2023-12-12 NOTE — HOSPITAL COURSE
Patient admitted to hospital medicine for acute on chronic respiratory failure. Patient admitted in volume overload state with increasing oxygen requirements from bl (3L) to 8 L. Patient also reported abdominal bloating and belching which resolved the next morning. 60 mg IV BID diureses regimen started with adequate urine output and reduction in b/t LE edema. H pylori workup for reported abdominal bloating and belching. Plan to discharge patient with Protonix, GI follow up, and PCP follow up. F/u with H pylori stool cultures. Prolonged diuresis time course secondary to hypotension. Patient to be discharged with strict instructions to follow medication regimen and strict cardiac diet. Patient reported not taking Metolazone as prescribed, discussed with patient adherence to regimen as this may cause him to retain fluid. Discussed with patient to take Metolazone 2.5 mg daily for 7 days until patient is followed up either in cardiology or PCP clinic with follow up labs (CMP). Patient also was informed that Eliquis may be better alternative to Warfarin and to discuss it with Cardiologist as cost may be barrier to receiving medication. Patient received Flu vaccine during hospital stay and encouraged to receive COVID booster at outpatient pharmacy or clinic. Patient also given instructions to weigh himself, and possible need to take another lasix dose if he finds himself gaining weight suddenly, SOB, and exhibiting leg swelling.. Ordered CMP and magnesium to be done 12/18/23 with follow up with PCP, cardiology, pulmonology, and GI. Patient verbalized understanding and had no further questions.

## 2023-12-12 NOTE — PROGRESS NOTES
"[facility] Medicine  Progress Note    Patient Name: Yong Mcintyre  MRN: [id number]  Patient Class: OP- Observation   Admission Date: 12/11/2023  Length of Stay: 0 days  Attending Physician: Bryan Degroot MD  Primary Care Provider: Gianni Escalona MD        Subjective:     Principal Problem:Acute on chronic respiratory failure with hypoxia and hypercapnia        HPI:  Mr. Mcintyre is a 47 y/o male with a PMH of HFrEF, HTN, pHTN, Chronic hypoxic respiratory failure (baseline oxygen req at home is 3 L), PFO (unsuccessful closure in 2006), AF (on coumadin, pt reports his "blood pools" that's why he's on it), Obesity hypoventilation syndrome, Morbid obesity presenting to the ED due to epigastric bloating that has been making feel SOB. Patient reports that this is a chronic issue that has been ongoing since February/March when he had Covid. Patient reports a few weeks or maybe a month later he has been having this epigastric bloating and associated belching that has been reoccurring. Patient reports that he visited his PCP, and suspected a role of GERD and prescribed him Protonix with little improvement at first. But when he ran out of his medication he never bothered to refill it because he felt like it never resolved his symptoms. Patient suspected it might've been 2/2 to BIPAP machine and "cleaned it thoroughly" and it resolved his symptoms mildly. Patient reports no specific association with any activity. He reports that when he eats the symptoms come on and bother him. He reports no particular change in his diet, was eating comfort foods a few months ago but transitioned his diet slowly to include more greens. Patient denies fever, N/V/D, no changes in BM, no increasing passing of gas, no chills, dysuria, flank pain, headaches, and orthopnea. Patient does admit to epigastric bloating, SOB (seems to be chronic but worsens with bloating), belching, and feeling mildly nauseous. No inciting event " "noted. Patient has been adherent to medication regimen consisting diuretics. No new sick contacts. Performs ADL's and works out.    Upon presentation to the ED, patient was noted to be in volume overloaded state. With increased oxygen requirements from baseline. Now on 8 L NC. Patient also noted to have b/t LE edema (patient reports more than usual). CXR in the ED revealed "  Reconfirmed pulmonary vascular congestion and bilateral interstitial and ground-glass opacities not felt significantly changed to above 2 exams and which could be on the basis of edema and or infection". Labs in the ED notable for elevated  (last checked it was 31 in May), Trop was 0.016 (neg), Na 131, K 3.1, CO2 39, Cr 1.9 (bl 1.4-1.5). Patient is HDS and afebrile.     Overview/Hospital Course:  Patient admitted to hospital medicine for acute on chronic respiratory failure. Patient admitted in volume overload state with increasing oxygen requirements from bl (3L) to 8 L. Patient also reported abdominal bloating and belching which resolved the next morning. 60 mg IV BID diureses regimen started with adequate urine output and reduction in b/t LE edema. H pylori workup for reported abdominal bloating and belching. Plan to discharge patient with Protonix, GI follow up, and PCP follow up.     Interval History:   NAEON. Patient reports he slept well. 1.4 L of urine output documented. Plan to titrate patient oxygen requirements today.         Past Medical History:   Diagnosis Date    Arthritis     CHF (congestive heart failure)     Diastolic dysfunction    Cor pulmonale 11/05/2012    Gallstones     Hypertension     Morbid obesity 11/05/2012    Obesity hypoventilation syndrome     On home oxygen therapy 03/07/2014    MALLIKA (obstructive sleep apnea)     BPAP 16/11 with 3 L at night (continuous O2).    Paroxysmal atrial fibrillation     PFO (patent foramen ovale) 11/05/2012    status post closure    Pickwickian syndrome 11/05/2012    Pulmonary " "hypertension        Past Surgical History:   Procedure Laterality Date    RIGHT HEART CATHETERIZATION Right 3/22/2022    Procedure: INSERTION, CATHETER, RIGHT HEART;  Surgeon: Tony Martínez MD;  Location: [facility] CATH LAB;  Service: Cardiology;  Laterality: Right;       Review of patient's allergies indicates:   Allergen Reactions    Lipitor [atorvastatin] Other (See Comments)     "it makes my legs feel like jelly"  Other reaction(s): causes legs to hurt       No current facility-administered medications on file prior to encounter.     Current Outpatient Medications on File Prior to Encounter   Medication Sig    acetaminophen (TYLENOL ARTHRITIS ORAL) Take 2 tablets by mouth daily as needed (pain).    albuterol (VENTOLIN HFA) 90 mcg/actuation inhaler Inhale 2 puffs into the lungs every 4 (four) hours as needed for Wheezing. Rescue    allopurinoL (ZYLOPRIM) 300 MG tablet Take 1 tablet (300 mg total) by mouth once daily.    ascorbic acid (VITAMIN C ORAL) Take 1 tablet by mouth once daily.    b complex vitamins tablet Take 1 tablet by mouth once daily.    CALCIUM ORAL Take 1 capsule by mouth once daily.    furosemide (LASIX) 40 MG tablet Take 80 mg by mouth 2 (two) times daily.    metOLazone (ZAROXOLYN) 2.5 MG tablet TAKE 1 TABLET(2.5 MG) BY MOUTH 3 TIMES A WEEK (Patient taking differently: Take 2.5 mg by mouth twice a week. Wed & Sat)    metoprolol tartrate (LOPRESSOR) 25 MG tablet TAKE 1 TABLET BY MOUTH TWICE DAILY    multivit,calc,min/FA/K1/lycop (ONE-A-DAY MEN'S COMPLETE ORAL) Take 1 tablet by mouth once daily.    omega-3 fatty acids/fish oil (FISH OIL-OMEGA-3 FATTY ACIDS) 300-1,000 mg capsule Take 1 capsule by mouth once daily.    pantoprazole (PROTONIX) 40 MG tablet Take 1 tablet (40 mg total) by mouth 2 (two) times daily. for 15 days    potassium chloride (MICRO-K) 10 MEQ CpSR TAKE 1 CAPSULE(10 MEQ) BY MOUTH EVERY DAY (Patient taking differently: 20 mEq. TAKE 1 CAPSULE(10 MEQ) BY MOUTH EVERY DAY)    predniSONE " (DELTASONE) 20 MG tablet 2 pills po daily for 4 days, then 1 pill po day for 4 days    tiotropium (SPIRIVA) 18 mcg inhalation capsule Inhale 18 mcg into the lungs once daily.    triamcinolone acetonide (NASACORT ALLERGY NASL) 2 sprays by Nasal route once daily.    warfarin (COUMADIN) 10 MG tablet TAKE 1 TABLET (10 MG) EVERY SUNDAY AND HALF A TABLET (5 MG) ON ALL OTHER DAYS AS DIRECTED BY COUMADIN CLINIC    WIXELA INHUB 250-50 mcg/dose diskus inhaler 1 puff 2 (two) times daily. USE 1 INHALATION BY MOUTH TWICE DAILY    [DISCONTINUED] sildenafil (REVATIO) 20 mg Tab Take 1 tablet (20 mg total) by mouth 3 (three) times daily.     Family History       Problem Relation (Age of Onset)    Arthritis Mother, Maternal Grandmother, Maternal Grandfather    Asthma Mother, Sister, Maternal Grandmother    Breast cancer Paternal Grandmother    Diabetes Maternal Grandmother    Down syndrome Brother    Gout Brother    Hypertension Father, Maternal Grandmother, Maternal Grandfather, Paternal Grandfather          Tobacco Use    Smoking status: Never    Smokeless tobacco: Never   Substance and Sexual Activity    Alcohol use: Yes     Comment: holidays  rare    Drug use: No    Sexual activity: Not Currently     Partners: Female     Review of Systems   Constitutional:  Positive for activity change and fatigue. Negative for appetite change, chills and fever.   HENT:  Negative for sinus pain, sore throat and trouble swallowing.    Eyes:  Negative for pain and visual disturbance.   Respiratory:  Positive for cough and shortness of breath. Negative for chest tightness and wheezing.    Cardiovascular:  Positive for palpitations and leg swelling. Negative for chest pain.   Gastrointestinal:  Positive for abdominal distention, abdominal pain (Near epigastric region) and nausea (Mildly). Negative for blood in stool, constipation, diarrhea and vomiting.   Genitourinary:  Negative for dysuria, flank pain and hematuria.   Skin:  Negative for color  change.   Neurological:  Negative for dizziness, syncope, light-headedness and headaches.   Psychiatric/Behavioral:  Negative for behavioral problems and confusion.      Objective:     Vital Signs (Most Recent):  Temp: 97.3 °F (36.3 °C) (12/12/23 1044)  Pulse: 87 (12/12/23 1058)  Resp: 16 (12/12/23 1044)  BP: 109/62 (12/12/23 1044)  SpO2: (!) 94 % (12/12/23 1044) Vital Signs (24h Range):  Temp:  [96 °F (35.6 °C)-98 °F (36.7 °C)] 97.3 °F (36.3 °C)  Pulse:  [84-99] 87  Resp:  [16-20] 16  SpO2:  [84 %-96 %] 94 %  BP: ()/(59-73) 109/62     Weight: 117.5 kg (259 lb 0.7 oz)  Body mass index is 44.44 kg/m².     Physical Exam  Constitutional:       Appearance: Normal appearance. He is obese. He is not ill-appearing.   HENT:      Head: Normocephalic and atraumatic.      Nose: Nose normal. No congestion.      Mouth/Throat:      Mouth: Mucous membranes are moist.   Eyes:      Extraocular Movements: Extraocular movements intact.      Conjunctiva/sclera: Conjunctivae normal.   Cardiovascular:      Rate and Rhythm: Normal rate and regular rhythm.      Pulses: Normal pulses.      Heart sounds: Normal heart sounds. No murmur heard.     No friction rub.   Pulmonary:      Effort: Pulmonary effort is normal. No respiratory distress.      Breath sounds: Normal breath sounds. Decreased air movement present. No wheezing.   Abdominal:      General: Bowel sounds are normal. There is distension.      Palpations: Abdomen is soft.      Tenderness: There is no abdominal tenderness.   Musculoskeletal:         General: No tenderness.      Right lower leg: Edema present.      Left lower leg: Edema present.   Skin:     General: Skin is warm.      Capillary Refill: Capillary refill takes less than 2 seconds.   Neurological:      General: No focal deficit present.      Mental Status: He is alert and oriented to person, place, and time.   Psychiatric:         Mood and Affect: Mood normal.         Behavior: Behavior normal.               "  Significant Labs: All pertinent labs within the past 24 hours have been reviewed.    Significant Imaging: I have reviewed all pertinent imaging results/findings within the past 24 hours.    Assessment/Plan:      * Acute on chronic respiratory failure with hypoxia and hypercapnia  Patient with Hypoxic Respiratory failure which is Acute on chronic.  he is on home oxygen at 3 LPM. Supplemental oxygen was provided and noted- 8 LPM on admission for SOB Oxygen Concentration (%):  [40] 40    .   Signs/symptoms of respiratory failure include- increased work of breathing. Contributing diagnoses includes - CHF and Obesity Hypoventilation Labs and images were reviewed. Patient Has not had a recent ABG. Will treat underlying causes and adjust management of respiratory failure as follows-     CXR in the ED revealed " Reconfirmed pulmonary vascular congestion and bilateral interstitial and ground-glass opacities not felt significantly changed to above 2 exams and which could be on the basis of edema and or infection". Patient BNP elevated. Patient PCO2 (but chronically elevated).    PLAN:   - Lasix 60 mg IV BID   - Strict I's and O's  - CBC, CMP   - BIPAP QHS in setting of chronic respiratory failure and Kevin  - Continuous oxygen monitoring   - Continue home maintenance inhalers  - Consider starting home Metolazone  2.5 mg if oxygen requirements plateau     Severe obesity (BMI >= 40)  Body mass index is 44.44 kg/m². Morbid obesity complicates all aspects of disease management from diagnostic modalities to treatment. Weight loss encouraged and health benefits explained to patient.         Epigastric abdominal pain  Patient reports chronic epigastric pain/bloating since last February. Patient reports that he has seen his PCP who started him on Protonix which helped relieve his symptoms mildly. Patient reports that no particular activity triggers the bloating. Patient related it sometimes to food that he has consumed but he reports " that is less likely because his diet has changes. Patient has regular BM's, no increase in flatulence, and no burning sensation. Patient does report an associated belching. He does also say primary reason he has come to the ED was to have this issue worked up because it has been bothering him.     Patient reports the bloating and pain has resolved this morning. Discussed with him H pylori testing and likely will be discharged with protonix 40 mg daily and GI F/u. H pylori IgG negative, f/u with stool sample bc can't rule out.     PLAN:   - Protonix 40 mg daily   - prn compazine for nausea   - Consider imaging if patient reports worsening pain/nausea. Physical exam is benign   - F/u with H pylori stool antigen       Chronic asthmatic bronchitis  Chronic and patient reports being on inhaler regimen at home. Some not on formulary.    PLAN:   - Continue home inhaler alternatives     CKD (chronic kidney disease), stage III  Creatine stable for now. BMP reviewed- noted Estimated Creatinine Clearance: 58.6 mL/min (A) (based on SCr of 1.8 mg/dL (H)). according to latest data. Based on current GFR, CKD stage is stage 3 - GFR 30-59.  Monitor UOP and serial BMP and adjust therapy as needed. Renally dose meds. Avoid nephrotoxic medications and procedures.        Acute on chronic heart failure with preserved ejection fraction (HFpEF)  Patient is identified as having Systolic (HFrEF) heart failure that is Acute on chronic. CHF is currently uncontrolled due to Continued edema of extremities. Latest ECHO performed and demonstrates- Results for orders placed during the hospital encounter of 05/02/23    Echo    Interpretation Summary  · The left ventricle is normal in size with mildly decreased systolic function.  · The estimated ejection fraction is 50%.  · There are segmental left ventricular wall motion abnormalities.  · There is abnormal septal wall motion.  · Left ventricular diastolic dysfunction.  · Moderate right ventricular  "enlargement with mildly reduced right ventricular systolic function.  · Mild right atrial enlargement.  · Normal central venous pressure (3 mmHg).  . Continue Beta Blocker and Furosemide and monitor clinical status closely. Monitor on telemetry. Patient is off CHF pathway.  Monitor strict Is&Os and daily weights.  Place on fluid restriction of 1.5 L. Cardiology has not been consulted. Continue to stress to patient importance of self efficacy and  on diet for CHF. Last BNP reviewed- and noted below   Recent Labs   Lab 12/11/23  1339   *         PLAN:   - Lasix 60 mg IV BID   - Strict I's and O's   - daily cbc, cmp   - Cardiac diet   - F/u with cardio on outpatient basis     Hypercoagulability due to atrial fibrillation  Patient has prior diagnosis of A fib. Patient reports he was told at one point his blood has tendency to "pool". Discussed with patient home medication regimen. Patient reports taking coumadin and being followed by coumadin clinic     PLAN:   - Continue patient Coumadin 5 mg adjust as needed   - Daily PT-INR   - Continuous tele monitoring   - Continue Lopressor         Hypertension  Chronic, controlled. Latest blood pressure and vitals reviewed-     Temp:  [96 °F (35.6 °C)-98 °F (36.7 °C)]   Pulse:  [84-99]   Resp:  [16-20]   BP: ()/(59-73)   SpO2:  [84 %-96 %] .   Home meds for hypertension were reviewed and noted below.   Hypertension Medications               furosemide (LASIX) 40 MG tablet Take 80 mg by mouth 2 (two) times daily.    metOLazone (ZAROXOLYN) 2.5 MG tablet TAKE 1 TABLET(2.5 MG) BY MOUTH 3 TIMES A WEEK    metoprolol tartrate (LOPRESSOR) 25 MG tablet TAKE 1 TABLET BY MOUTH TWICE DAILY            While in the hospital, will manage blood pressure as follows; Adjust home antihypertensive regimen as follows- Lasix (IV) and lopressor     Will utilize p.r.n. blood pressure medication only if patient's blood pressure greater than 180/110 and he develops symptoms such as " worsening chest pain or shortness of breath.    MALLIKA (obstructive sleep apnea)  Patient reports chronic diagnosis of MALLIKA. Recently had seen Elba Maloney NP in sleep medicine clinic on 11/1/23. Patient has been on 16/11 BIPAP settings with no concern for titration. Sleep clinic recommending continuing with current settings.    PLAN:  - BIPAP QHS with 16/11 settings         VTE Risk Mitigation (From admission, onward)           Ordered     warfarin (COUMADIN) tablet 5 mg  Daily         12/11/23 1721                    Discharge Planning   ESTEBAN: 12/15/2023     Code Status: Prior   Is the patient medically ready for discharge?: No    Reason for patient still in hospital (select all that apply): Treatment  Discharge Plan A: (P) Home                  Veronica Gong DO  Department of Hospital Medicine   [facility]

## 2023-12-12 NOTE — ASSESSMENT & PLAN NOTE
Chronic, controlled. Latest blood pressure and vitals reviewed-     Temp:  [96 °F (35.6 °C)-98 °F (36.7 °C)]   Pulse:  [84-99]   Resp:  [16-20]   BP: ()/(59-73)   SpO2:  [84 %-96 %] .   Home meds for hypertension were reviewed and noted below.   Hypertension Medications               furosemide (LASIX) 40 MG tablet Take 80 mg by mouth 2 (two) times daily.    metOLazone (ZAROXOLYN) 2.5 MG tablet TAKE 1 TABLET(2.5 MG) BY MOUTH 3 TIMES A WEEK    metoprolol tartrate (LOPRESSOR) 25 MG tablet TAKE 1 TABLET BY MOUTH TWICE DAILY            While in the hospital, will manage blood pressure as follows; Adjust home antihypertensive regimen as follows- Lasix (IV) and lopressor     Will utilize p.r.n. blood pressure medication only if patient's blood pressure greater than 180/110 and he develops symptoms such as worsening chest pain or shortness of breath.

## 2023-12-12 NOTE — ASSESSMENT & PLAN NOTE
Patient reports chronic epigastric pain/bloating since last February. Patient reports that he has seen his PCP who started him on Protonix which helped relieve his symptoms mildly. Patient reports that no particular activity triggers the bloating. Patient related it sometimes to food that he has consumed but he reports that is less likely because his diet has changes. Patient has regular BM's, no increase in flatulence, and no burning sensation. Patient does report an associated belching. He does also say primary reason he has come to the ED was to have this issue worked up because it has been bothering him.     PLAN:   - Protonix 40 mg daily   - prn compazine for nausea   - Consider imaging if patient reports worsening pain/nausea. Physical exam is benign   - F/u with H pylori IgG antibody, and H pylori stool antigen

## 2023-12-12 NOTE — ASSESSMENT & PLAN NOTE
Patient is identified as having Systolic (HFrEF) heart failure that is Acute on chronic. CHF is currently uncontrolled due to Continued edema of extremities. Latest ECHO performed and demonstrates- Results for orders placed during the hospital encounter of 05/02/23    Echo    Interpretation Summary  · The left ventricle is normal in size with mildly decreased systolic function.  · The estimated ejection fraction is 50%.  · There are segmental left ventricular wall motion abnormalities.  · There is abnormal septal wall motion.  · Left ventricular diastolic dysfunction.  · Moderate right ventricular enlargement with mildly reduced right ventricular systolic function.  · Mild right atrial enlargement.  · Normal central venous pressure (3 mmHg).  . Continue Beta Blocker and Furosemide and monitor clinical status closely. Monitor on telemetry. Patient is off CHF pathway.  Monitor strict Is&Os and daily weights.  Place on fluid restriction of 1.5 L. Cardiology has not been consulted. Continue to stress to patient importance of self efficacy and  on diet for CHF. Last BNP reviewed- and noted below   Recent Labs   Lab 12/11/23  1339   *         PLAN:   - Lasix 60 mg IV BID   - Strict I's and O's   - daily cbc, cmp   - Cardiac diet   - F/u with cardio on outpatient basis

## 2023-12-12 NOTE — ASSESSMENT & PLAN NOTE
"Patient with Hypoxic Respiratory failure which is Acute on chronic.  he is on home oxygen at 3 LPM. Supplemental oxygen was provided and noted- 8 LPM on admission for SOB Oxygen Concentration (%):  [40] 40    .   Signs/symptoms of respiratory failure include- increased work of breathing. Contributing diagnoses includes - CHF and Obesity Hypoventilation Labs and images were reviewed. Patient Has not had a recent ABG. Will treat underlying causes and adjust management of respiratory failure as follows-     CXR in the ED revealed " Reconfirmed pulmonary vascular congestion and bilateral interstitial and ground-glass opacities not felt significantly changed to above 2 exams and which could be on the basis of edema and or infection". Patient BNP elevated. Patient PCO2 (but chronically elevated).    PLAN:   - Lasix 60 mg IV BID   - Strict I's and O's  - CBC, CMP   - BIPAP QHS in setting of chronic respiratory failure and Kevin  - Continuous oxygen monitoring   - Continue home maintenance inhalers  - Consider starting home Metolazone  2.5 mg if oxygen requirements plateau   "

## 2023-12-12 NOTE — ASSESSMENT & PLAN NOTE
Chronic and patient reports being on inhaler regimen at home. Some not on formulary.    PLAN:   - Continue home inhaler alternatives

## 2023-12-12 NOTE — ASSESSMENT & PLAN NOTE
Chronic, controlled. Latest blood pressure and vitals reviewed-     Temp:  [97.7 °F (36.5 °C)-97.9 °F (36.6 °C)]   Pulse:  [85-96]   Resp:  [18-19]   BP: (102-118)/(59-70)   SpO2:  [74 %-99 %] .   Home meds for hypertension were reviewed and noted below.   Hypertension Medications               furosemide (LASIX) 40 MG tablet Take 80 mg by mouth 2 (two) times daily.    metOLazone (ZAROXOLYN) 2.5 MG tablet TAKE 1 TABLET(2.5 MG) BY MOUTH 3 TIMES A WEEK    metoprolol tartrate (LOPRESSOR) 25 MG tablet TAKE 1 TABLET BY MOUTH TWICE DAILY            While in the hospital, will manage blood pressure as follows; Adjust home antihypertensive regimen as follows- Lasix (IV) and lopressor     Will utilize p.r.n. blood pressure medication only if patient's blood pressure greater than 180/110 and he develops symptoms such as worsening chest pain or shortness of breath.

## 2023-12-12 NOTE — ASSESSMENT & PLAN NOTE
Patient is identified as having Systolic (HFrEF) heart failure that is Acute on chronic. CHF is currently uncontrolled due to Continued edema of extremities. Latest ECHO performed and demonstrates- Results for orders placed during the hospital encounter of 05/02/23    Echo    Interpretation Summary  · The left ventricle is normal in size with mildly decreased systolic function.  · The estimated ejection fraction is 50%.  · There are segmental left ventricular wall motion abnormalities.  · There is abnormal septal wall motion.  · Left ventricular diastolic dysfunction.  · Moderate right ventricular enlargement with mildly reduced right ventricular systolic function.  · Mild right atrial enlargement.  · Normal central venous pressure (3 mmHg).  . Continue Beta Blocker and Furosemide and monitor clinical status closely. Monitor on telemetry. Patient is off CHF pathway.  Monitor strict Is&Os and daily weights.  Place on fluid restriction of 1.5 L. Cardiology has not been consulted. Continue to stress to patient importance of self efficacy and  on diet for CHF. Last BNP reviewed- and noted below   Recent Labs   Lab 12/11/23  1339   *       PLAN:   - Lasix 100 mg IV starting tomorrow   - Strict I's and O's   - daily cbc, cmp   - Cardiac diet   - Lasix 40 mg IV today

## 2023-12-12 NOTE — ASSESSMENT & PLAN NOTE
Patient reports chronic epigastric pain/bloating since last February. Patient reports that he has seen his PCP who started him on Protonix which helped relieve his symptoms mildly. Patient reports that no particular activity triggers the bloating. Patient related it sometimes to food that he has consumed but he reports that is less likely because his diet has changes. Patient has regular BM's, no increase in flatulence, and no burning sensation. Patient does report an associated belching. He does also say primary reason he has come to the ED was to have this issue worked up because it has been bothering him.     Patient reports the bloating and pain has resolved this morning. Discussed with him H pylori testing and likely will be discharged with protonix 40 mg daily and GI F/u. H pylori IgG negative, f/u with stool sample bc can't rule out.     PLAN:   - Protonix 40 mg daily   - prn compazine for nausea   - Consider imaging if patient reports worsening pain/nausea. Physical exam is benign   - F/u with H pylori stool antigen

## 2023-12-13 LAB
ALBUMIN SERPL BCP-MCNC: 2.6 G/DL (ref 3.5–5.2)
ALP SERPL-CCNC: 105 U/L (ref 55–135)
ALT SERPL W/O P-5'-P-CCNC: 17 U/L (ref 10–44)
ANION GAP SERPL CALC-SCNC: 11 MMOL/L (ref 8–16)
AST SERPL-CCNC: 19 U/L (ref 10–40)
BASOPHILS # BLD AUTO: 0.04 K/UL (ref 0–0.2)
BASOPHILS NFR BLD: 0.4 % (ref 0–1.9)
BILIRUB SERPL-MCNC: 1.5 MG/DL (ref 0.1–1)
BUN SERPL-MCNC: 61 MG/DL (ref 6–20)
CALCIUM SERPL-MCNC: 9.1 MG/DL (ref 8.7–10.5)
CHLORIDE SERPL-SCNC: 89 MMOL/L (ref 95–110)
CO2 SERPL-SCNC: 40 MMOL/L (ref 23–29)
CREAT SERPL-MCNC: 1.6 MG/DL (ref 0.5–1.4)
DIFFERENTIAL METHOD: ABNORMAL
EOSINOPHIL # BLD AUTO: 0 K/UL (ref 0–0.5)
EOSINOPHIL NFR BLD: 0.2 % (ref 0–8)
ERYTHROCYTE [DISTWIDTH] IN BLOOD BY AUTOMATED COUNT: 20.6 % (ref 11.5–14.5)
EST. GFR  (NO RACE VARIABLE): 52.8 ML/MIN/1.73 M^2
GLUCOSE SERPL-MCNC: 95 MG/DL (ref 70–110)
HCT VFR BLD AUTO: 54.5 % (ref 40–54)
HGB BLD-MCNC: 15.9 G/DL (ref 14–18)
IMM GRANULOCYTES # BLD AUTO: 0.01 K/UL (ref 0–0.04)
IMM GRANULOCYTES NFR BLD AUTO: 0.1 % (ref 0–0.5)
INR PPP: 1.5 (ref 0.8–1.2)
LYMPHOCYTES # BLD AUTO: 1.3 K/UL (ref 1–4.8)
LYMPHOCYTES NFR BLD: 14.2 % (ref 18–48)
MAGNESIUM SERPL-MCNC: 1.5 MG/DL (ref 1.6–2.6)
MCH RBC QN AUTO: 25 PG (ref 27–31)
MCHC RBC AUTO-ENTMCNC: 29.2 G/DL (ref 32–36)
MCV RBC AUTO: 86 FL (ref 82–98)
MONOCYTES # BLD AUTO: 0.8 K/UL (ref 0.3–1)
MONOCYTES NFR BLD: 8.5 % (ref 4–15)
NEUTROPHILS # BLD AUTO: 6.8 K/UL (ref 1.8–7.7)
NEUTROPHILS NFR BLD: 76.6 % (ref 38–73)
NRBC BLD-RTO: 0 /100 WBC
PHOSPHATE SERPL-MCNC: 3.8 MG/DL (ref 2.7–4.5)
PLATELET # BLD AUTO: 205 K/UL (ref 150–450)
PMV BLD AUTO: 9.9 FL (ref 9.2–12.9)
POTASSIUM SERPL-SCNC: 2.8 MMOL/L (ref 3.5–5.1)
PROT SERPL-MCNC: 6.9 G/DL (ref 6–8.4)
PROTHROMBIN TIME: 15.6 SEC (ref 9–12.5)
RBC # BLD AUTO: 6.35 M/UL (ref 4.6–6.2)
SODIUM SERPL-SCNC: 140 MMOL/L (ref 136–145)
WBC # BLD AUTO: 8.89 K/UL (ref 3.9–12.7)

## 2023-12-13 PROCEDURE — 27000221 HC OXYGEN, UP TO 24 HOURS: Mod: HCNC

## 2023-12-13 PROCEDURE — 36415 COLL VENOUS BLD VENIPUNCTURE: CPT | Mod: HCNC

## 2023-12-13 PROCEDURE — 25000003 PHARM REV CODE 250: Mod: HCNC

## 2023-12-13 PROCEDURE — 85025 COMPLETE CBC W/AUTO DIFF WBC: CPT | Mod: HCNC

## 2023-12-13 PROCEDURE — 96365 THER/PROPH/DIAG IV INF INIT: CPT

## 2023-12-13 PROCEDURE — 21400001 HC TELEMETRY ROOM: Mod: HCNC

## 2023-12-13 PROCEDURE — 96366 THER/PROPH/DIAG IV INF ADDON: CPT

## 2023-12-13 PROCEDURE — 87338 HPYLORI STOOL AG IA: CPT | Mod: HCNC

## 2023-12-13 PROCEDURE — 25000242 PHARM REV CODE 250 ALT 637 W/ HCPCS: Mod: HCNC

## 2023-12-13 PROCEDURE — 25000003 PHARM REV CODE 250: Mod: HCNC | Performed by: HOSPITALIST

## 2023-12-13 PROCEDURE — 96376 TX/PRO/DX INJ SAME DRUG ADON: CPT

## 2023-12-13 PROCEDURE — 85610 PROTHROMBIN TIME: CPT | Mod: HCNC

## 2023-12-13 PROCEDURE — 94640 AIRWAY INHALATION TREATMENT: CPT | Mod: HCNC

## 2023-12-13 PROCEDURE — 80053 COMPREHEN METABOLIC PANEL: CPT | Mod: HCNC

## 2023-12-13 PROCEDURE — 94761 N-INVAS EAR/PLS OXIMETRY MLT: CPT | Mod: HCNC

## 2023-12-13 PROCEDURE — 63600175 PHARM REV CODE 636 W HCPCS: Mod: HCNC

## 2023-12-13 PROCEDURE — 99900035 HC TECH TIME PER 15 MIN (STAT): Mod: HCNC

## 2023-12-13 PROCEDURE — 83735 ASSAY OF MAGNESIUM: CPT | Mod: HCNC

## 2023-12-13 PROCEDURE — 94660 CPAP INITIATION&MGMT: CPT | Mod: HCNC

## 2023-12-13 PROCEDURE — 84100 ASSAY OF PHOSPHORUS: CPT | Mod: HCNC

## 2023-12-13 RX ORDER — MONTELUKAST SODIUM 10 MG/1
10 TABLET ORAL NIGHTLY
Status: ON HOLD | COMMUNITY
End: 2024-01-25

## 2023-12-13 RX ORDER — METOLAZONE 2.5 MG/1
2.5 TABLET ORAL ONCE
Status: DISCONTINUED | OUTPATIENT
Start: 2023-12-13 | End: 2023-12-14

## 2023-12-13 RX ORDER — IPRATROPIUM BROMIDE AND ALBUTEROL SULFATE 2.5; .5 MG/3ML; MG/3ML
3 SOLUTION RESPIRATORY (INHALATION) EVERY 6 HOURS PRN
Status: DISCONTINUED | OUTPATIENT
Start: 2023-12-13 | End: 2023-12-13

## 2023-12-13 RX ORDER — IPRATROPIUM BROMIDE AND ALBUTEROL SULFATE 2.5; .5 MG/3ML; MG/3ML
3 SOLUTION RESPIRATORY (INHALATION) ONCE
Status: COMPLETED | OUTPATIENT
Start: 2023-12-13 | End: 2023-12-13

## 2023-12-13 RX ORDER — MAGNESIUM SULFATE HEPTAHYDRATE 40 MG/ML
2 INJECTION, SOLUTION INTRAVENOUS ONCE
Status: COMPLETED | OUTPATIENT
Start: 2023-12-13 | End: 2023-12-13

## 2023-12-13 RX ORDER — BENZONATATE 100 MG/1
200 CAPSULE ORAL 3 TIMES DAILY PRN
Status: DISCONTINUED | OUTPATIENT
Start: 2023-12-13 | End: 2023-12-15 | Stop reason: HOSPADM

## 2023-12-13 RX ORDER — METOLAZONE 2.5 MG/1
2.5 TABLET ORAL
Status: DISCONTINUED | OUTPATIENT
Start: 2023-12-13 | End: 2023-12-15 | Stop reason: HOSPADM

## 2023-12-13 RX ORDER — POTASSIUM CHLORIDE 20 MEQ/1
40 TABLET, EXTENDED RELEASE ORAL
Status: COMPLETED | OUTPATIENT
Start: 2023-12-13 | End: 2023-12-13

## 2023-12-13 RX ADMIN — PANTOPRAZOLE SODIUM 40 MG: 40 TABLET, DELAYED RELEASE ORAL at 09:12

## 2023-12-13 RX ADMIN — TIOTROPIUM BROMIDE INHALATION SPRAY 2 PUFF: 3.12 SPRAY, METERED RESPIRATORY (INHALATION) at 10:12

## 2023-12-13 RX ADMIN — POTASSIUM CHLORIDE 40 MEQ: 1500 TABLET, EXTENDED RELEASE ORAL at 09:12

## 2023-12-13 RX ADMIN — WARFARIN SODIUM 7.5 MG: 2.5 TABLET ORAL at 05:12

## 2023-12-13 RX ADMIN — METOPROLOL TARTRATE 25 MG: 25 TABLET, FILM COATED ORAL at 09:12

## 2023-12-13 RX ADMIN — BENZONATATE 200 MG: 100 CAPSULE ORAL at 06:12

## 2023-12-13 RX ADMIN — FLUTICASONE FUROATE AND VILANTEROL TRIFENATATE 1 PUFF: 100; 25 POWDER RESPIRATORY (INHALATION) at 10:12

## 2023-12-13 RX ADMIN — POTASSIUM CHLORIDE 40 MEQ: 1500 TABLET, EXTENDED RELEASE ORAL at 10:12

## 2023-12-13 RX ADMIN — IPRATROPIUM BROMIDE AND ALBUTEROL SULFATE 3 ML: .5; 3 SOLUTION RESPIRATORY (INHALATION) at 05:12

## 2023-12-13 RX ADMIN — FUROSEMIDE 60 MG: 10 INJECTION, SOLUTION INTRAMUSCULAR; INTRAVENOUS at 09:12

## 2023-12-13 RX ADMIN — ALLOPURINOL 300 MG: 300 TABLET ORAL at 09:12

## 2023-12-13 RX ADMIN — MAGNESIUM SULFATE HEPTAHYDRATE 2 G: 40 INJECTION, SOLUTION INTRAVENOUS at 10:12

## 2023-12-13 RX ADMIN — POTASSIUM CHLORIDE 40 MEQ: 1500 TABLET, EXTENDED RELEASE ORAL at 12:12

## 2023-12-13 NOTE — SUBJECTIVE & OBJECTIVE
"Interval History:   NAEON. Patient had 2.2 L of urine output in 24 hr period. Added Metolazone 2.5 mg scheduled to diureses regimen. Prn breathing treatment for SOB.         Past Medical History:   Diagnosis Date    Arthritis     CHF (congestive heart failure)     Diastolic dysfunction    Cor pulmonale 11/05/2012    Gallstones     Hypertension     Morbid obesity 11/05/2012    Obesity hypoventilation syndrome     On home oxygen therapy 03/07/2014    MALLIKA (obstructive sleep apnea)     BPAP 16/11 with 3 L at night (continuous O2).    Paroxysmal atrial fibrillation     PFO (patent foramen ovale) 11/05/2012    status post closure    Pickwickian syndrome 11/05/2012    Pulmonary hypertension        Past Surgical History:   Procedure Laterality Date    RIGHT HEART CATHETERIZATION Right 3/22/2022    Procedure: INSERTION, CATHETER, RIGHT HEART;  Surgeon: Tony Martínez MD;  Location: [facility] CATH LAB;  Service: Cardiology;  Laterality: Right;       Review of patient's allergies indicates:   Allergen Reactions    Lipitor [atorvastatin] Other (See Comments)     "it makes my legs feel like jelly"  Other reaction(s): causes legs to hurt       No current facility-administered medications on file prior to encounter.     Current Outpatient Medications on File Prior to Encounter   Medication Sig    acetaminophen (TYLENOL ARTHRITIS ORAL) Take 2 tablets by mouth daily as needed (pain).    albuterol (VENTOLIN HFA) 90 mcg/actuation inhaler Inhale 2 puffs into the lungs every 4 (four) hours as needed for Wheezing. Rescue    allopurinoL (ZYLOPRIM) 300 MG tablet Take 1 tablet (300 mg total) by mouth once daily.    ascorbic acid (VITAMIN C ORAL) Take 1 tablet by mouth once daily.    b complex vitamins tablet Take 1 tablet by mouth once daily.    CALCIUM ORAL Take 1 capsule by mouth once daily.    furosemide (LASIX) 40 MG tablet Take 80 mg by mouth 2 (two) times daily.    metOLazone (ZAROXOLYN) 2.5 MG tablet TAKE 1 TABLET(2.5 MG) BY MOUTH 3 TIMES " A WEEK (Patient taking differently: Take 2.5 mg by mouth twice a week. Wed & Sat)    metoprolol tartrate (LOPRESSOR) 25 MG tablet TAKE 1 TABLET BY MOUTH TWICE DAILY    multivit,calc,min/FA/K1/lycop (ONE-A-DAY MEN'S COMPLETE ORAL) Take 1 tablet by mouth once daily.    omega-3 fatty acids/fish oil (FISH OIL-OMEGA-3 FATTY ACIDS) 300-1,000 mg capsule Take 1 capsule by mouth once daily.    pantoprazole (PROTONIX) 40 MG tablet Take 1 tablet (40 mg total) by mouth 2 (two) times daily. for 15 days    potassium chloride (MICRO-K) 10 MEQ CpSR TAKE 1 CAPSULE(10 MEQ) BY MOUTH EVERY DAY (Patient taking differently: 20 mEq. TAKE 1 CAPSULE(10 MEQ) BY MOUTH EVERY DAY)    predniSONE (DELTASONE) 20 MG tablet 2 pills po daily for 4 days, then 1 pill po day for 4 days    tiotropium (SPIRIVA) 18 mcg inhalation capsule Inhale 18 mcg into the lungs once daily.    triamcinolone acetonide (NASACORT ALLERGY NASL) 2 sprays by Nasal route once daily.    warfarin (COUMADIN) 10 MG tablet TAKE 1 TABLET (10 MG) EVERY SUNDAY AND HALF A TABLET (5 MG) ON ALL OTHER DAYS AS DIRECTED BY COUMADIN CLINIC    WIXELA INHUB 250-50 mcg/dose diskus inhaler 1 puff 2 (two) times daily. USE 1 INHALATION BY MOUTH TWICE DAILY    [DISCONTINUED] sildenafil (REVATIO) 20 mg Tab Take 1 tablet (20 mg total) by mouth 3 (three) times daily.     Family History       Problem Relation (Age of Onset)    Arthritis Mother, Maternal Grandmother, Maternal Grandfather    Asthma Mother, Sister, Maternal Grandmother    Breast cancer Paternal Grandmother    Diabetes Maternal Grandmother    Down syndrome Brother    Gout Brother    Hypertension Father, Maternal Grandmother, Maternal Grandfather, Paternal Grandfather          Tobacco Use    Smoking status: Never    Smokeless tobacco: Never   Substance and Sexual Activity    Alcohol use: Yes     Comment: holidays  rare    Drug use: No    Sexual activity: Not Currently     Partners: Female     Review of Systems   Constitutional:  Positive  for activity change and fatigue. Negative for appetite change, chills and fever.   HENT:  Negative for sinus pain, sore throat and trouble swallowing.    Eyes:  Negative for pain and visual disturbance.   Respiratory:  Positive for cough and shortness of breath. Negative for chest tightness and wheezing.    Cardiovascular:  Positive for palpitations and leg swelling. Negative for chest pain.   Gastrointestinal:  Positive for abdominal distention, abdominal pain (Near epigastric region) and nausea (Mildly). Negative for blood in stool, constipation, diarrhea and vomiting.   Genitourinary:  Negative for dysuria, flank pain and hematuria.   Skin:  Negative for color change.   Neurological:  Negative for dizziness, syncope, light-headedness and headaches.   Psychiatric/Behavioral:  Negative for behavioral problems and confusion.      Objective:     Vital Signs (Most Recent):  Temp: 98.1 °F (36.7 °C) (12/13/23 1049)  Pulse: 92 (12/13/23 1105)  Resp: 16 (12/13/23 1049)  BP: (!) 95/53 (12/13/23 1049)  SpO2: (!) 92 % (12/13/23 1049) Vital Signs (24h Range):  Temp:  [97.8 °F (36.6 °C)-98.8 °F (37.1 °C)] 98.1 °F (36.7 °C)  Pulse:  [82-98] 92  Resp:  [16-20] 16  SpO2:  [90 %-96 %] 92 %  BP: ()/(53-71) 95/53     Weight: 117.5 kg (259 lb 0.7 oz)  Body mass index is 44.44 kg/m².     Physical Exam  Constitutional:       Appearance: Normal appearance. He is obese. He is not ill-appearing.   HENT:      Head: Normocephalic and atraumatic.      Nose: Nose normal. No congestion.      Mouth/Throat:      Mouth: Mucous membranes are moist.   Eyes:      Extraocular Movements: Extraocular movements intact.      Conjunctiva/sclera: Conjunctivae normal.   Cardiovascular:      Rate and Rhythm: Normal rate and regular rhythm.      Pulses: Normal pulses.      Heart sounds: Normal heart sounds. No murmur heard.     No friction rub.   Pulmonary:      Effort: Pulmonary effort is normal. No respiratory distress.      Breath sounds: Normal  breath sounds. Decreased air movement present. No wheezing.   Abdominal:      General: Bowel sounds are normal. There is distension.      Palpations: Abdomen is soft.      Tenderness: There is no abdominal tenderness.   Musculoskeletal:         General: No tenderness.      Right lower leg: Edema present.      Left lower leg: Edema present.   Skin:     General: Skin is warm.      Capillary Refill: Capillary refill takes less than 2 seconds.   Neurological:      General: No focal deficit present.      Mental Status: He is alert and oriented to person, place, and time.   Psychiatric:         Mood and Affect: Mood normal.         Behavior: Behavior normal.                Significant Labs: All pertinent labs within the past 24 hours have been reviewed.    Significant Imaging: I have reviewed all pertinent imaging results/findings within the past 24 hours.

## 2023-12-13 NOTE — PLAN OF CARE
Problem: Adult Inpatient Plan of Care  Goal: Plan of Care Review  Outcome: Ongoing, Progressing     Problem: Bariatric Environmental Safety  Goal: Safety Maintained with Care  Outcome: Ongoing, Progressing     Problem: Adult Inpatient Plan of Care  Goal: Patient-Specific Goal (Individualized)  Outcome: Ongoing, Progressing   Discussed resp status with pt the need for O2 at 8 lpm nc and the need to titrate the o2 down as possible.  He has been compliant with his fluid intake. Out put monitored closely.

## 2023-12-13 NOTE — ASSESSMENT & PLAN NOTE
"Patient with Hypoxic Respiratory failure which is Acute on chronic.  he is on home oxygen at 3 LPM. Supplemental oxygen was provided and noted- 8 LPM on admission for SOB Oxygen Concentration (%):  [40] 40    .   Signs/symptoms of respiratory failure include- increased work of breathing. Contributing diagnoses includes - CHF and Obesity Hypoventilation Labs and images were reviewed. Patient Has not had a recent ABG. Will treat underlying causes and adjust management of respiratory failure as follows-     CXR in the ED revealed " Reconfirmed pulmonary vascular congestion and bilateral interstitial and ground-glass opacities not felt significantly changed to above 2 exams and which could be on the basis of edema and or infection". Patient BNP elevated. Patient PCO2 (but chronically elevated).    Despite aggressive IV diuresis patient remains well above baseline liter flow of O2. Given this lack of improvement we have included metolazone in his management plan for HF.     PLAN:   - Lasix 60 mg IV BID   - Strict I's and O's  - CBC, CMP   - BIPAP QHS in setting of chronic respiratory failure and Kevin  - Continuous oxygen monitoring   - Continue home maintenance inhalers  - Metolazone 2.5 mg daily started 12/14/23  "

## 2023-12-13 NOTE — ASSESSMENT & PLAN NOTE
"Patient has prior diagnosis of A fib. Patient reports he was told at one point his blood has tendency to "pool". Discussed with patient home medication regimen. Patient reports taking coumadin and being followed by coumadin clinic     PLAN:   - Continue patient Coumadin 5 mg adjust as needed, with appreciated assistance from pharmacy  - Patient interested in transitioning to DOAC on discharge  - Daily PT-INR   - Continuous tele monitoring   - Continue Lopressor       "

## 2023-12-13 NOTE — RESPIRATORY THERAPY
RAPID RESPONSE RESPIRATORY CHART CHECK       Chart check completed.  Pt supplemental oxygen weaned over course of admission.  Please call [phone] for further concerns or assistance.        independent/needs device

## 2023-12-13 NOTE — ASSESSMENT & PLAN NOTE
Chronic, controlled. Latest blood pressure and vitals reviewed-     Temp:  [97.8 °F (36.6 °C)-98.8 °F (37.1 °C)]   Pulse:  [82-98]   Resp:  [16-20]   BP: ()/(53-71)   SpO2:  [90 %-96 %] .   Home meds for hypertension were reviewed and noted below.   Hypertension Medications               furosemide (LASIX) 40 MG tablet Take 80 mg by mouth 2 (two) times daily.    metOLazone (ZAROXOLYN) 2.5 MG tablet TAKE 1 TABLET(2.5 MG) BY MOUTH 3 TIMES A WEEK    metoprolol tartrate (LOPRESSOR) 25 MG tablet TAKE 1 TABLET BY MOUTH TWICE DAILY            While in the hospital, will manage blood pressure as follows; Adjust home antihypertensive regimen as follows- Lasix (IV) and lopressor     Will utilize p.r.n. blood pressure medication only if patient's blood pressure greater than 180/110 and he develops symptoms such as worsening chest pain or shortness of breath.

## 2023-12-13 NOTE — ASSESSMENT & PLAN NOTE
Patient is identified as having Systolic (HFrEF) heart failure that is Acute on chronic. CHF is currently uncontrolled due to Continued edema of extremities. Latest ECHO performed and demonstrates- Results for orders placed during the hospital encounter of 05/02/23    Echo    Interpretation Summary  · The left ventricle is normal in size with mildly decreased systolic function.  · The estimated ejection fraction is 50%.  · There are segmental left ventricular wall motion abnormalities.  · There is abnormal septal wall motion.  · Left ventricular diastolic dysfunction.  · Moderate right ventricular enlargement with mildly reduced right ventricular systolic function.  · Mild right atrial enlargement.  · Normal central venous pressure (3 mmHg).  . Continue Beta Blocker and Furosemide and monitor clinical status closely. Monitor on telemetry. Patient is off CHF pathway.  Monitor strict Is&Os and daily weights.  Place on fluid restriction of 1.5 L. Cardiology has not been consulted. Continue to stress to patient importance of self efficacy and  on diet for CHF. Last BNP reviewed- and noted below   Recent Labs   Lab 12/11/23  1339   *         PLAN:   - Lasix 60 mg IV BID, Metolazone 2.5 mg daily  - Strict I's and O's   - daily cbc, cmp   - Cardiac diet   - F/u with cardio on outpatient basis

## 2023-12-13 NOTE — ASSESSMENT & PLAN NOTE
"Patient has prior diagnosis of A fib. Patient reports he was told at one point his blood has tendency to "pool". Discussed with patient home medication regimen. Patient reports taking coumadin and being followed by coumadin clinic     PLAN:   - Continue patient Coumadin 5 mg adjust as needed, dose on 12/13/23 adjusted to "7.5 mg" due to INR   - Daily PT-INR   - Continuous tele monitoring   - Continue Lopressor       "

## 2023-12-13 NOTE — PROGRESS NOTES
"[facility] Medicine  Progress Note    Patient Name: Yong Mcintyre  MRN: [id number]  Patient Class: OP- Observation   Admission Date: 12/11/2023  Length of Stay: 0 days  Attending Physician: Bryan Degroot MD  Primary Care Provider: Gianni Escalona MD        Subjective:     Principal Problem:Acute on chronic respiratory failure with hypoxia and hypercapnia        HPI:  Mr. Mcintyre is a 49 y/o male with a PMH of HFrEF, HTN, pHTN, Chronic hypoxic respiratory failure (baseline oxygen req at home is 3 L), PFO (unsuccessful closure in 2006), AF (on coumadin, pt reports his "blood pools" that's why he's on it), Obesity hypoventilation syndrome, Morbid obesity presenting to the ED due to epigastric bloating that has been making feel SOB. Patient reports that this is a chronic issue that has been ongoing since February/March when he had Covid. Patient reports a few weeks or maybe a month later he has been having this epigastric bloating and associated belching that has been reoccurring. Patient reports that he visited his PCP, and suspected a role of GERD and prescribed him Protonix with little improvement at first. But when he ran out of his medication he never bothered to refill it because he felt like it never resolved his symptoms. Patient suspected it might've been 2/2 to BIPAP machine and "cleaned it thoroughly" and it resolved his symptoms mildly. Patient reports no specific association with any activity. He reports that when he eats the symptoms come on and bother him. He reports no particular change in his diet, was eating comfort foods a few months ago but transitioned his diet slowly to include more greens. Patient denies fever, N/V/D, no changes in BM, no increasing passing of gas, no chills, dysuria, flank pain, headaches, and orthopnea. Patient does admit to epigastric bloating, SOB (seems to be chronic but worsens with bloating), belching, and feeling mildly nauseous. No inciting event " "noted. Patient has been adherent to medication regimen consisting diuretics. No new sick contacts. Performs ADL's and works out.    Upon presentation to the ED, patient was noted to be in volume overloaded state. With increased oxygen requirements from baseline. Now on 8 L NC. Patient also noted to have b/t LE edema (patient reports more than usual). CXR in the ED revealed "  Reconfirmed pulmonary vascular congestion and bilateral interstitial and ground-glass opacities not felt significantly changed to above 2 exams and which could be on the basis of edema and or infection". Labs in the ED notable for elevated  (last checked it was 31 in May), Trop was 0.016 (neg), Na 131, K 3.1, CO2 39, Cr 1.9 (bl 1.4-1.5). Patient is HDS and afebrile.     Overview/Hospital Course:  Patient admitted to hospital medicine for acute on chronic respiratory failure. Patient admitted in volume overload state with increasing oxygen requirements from bl (3L) to 8 L. Patient also reported abdominal bloating and belching which resolved the next morning. 60 mg IV BID diureses regimen started with adequate urine output and reduction in b/t LE edema. H pylori workup for reported abdominal bloating and belching. Plan to discharge patient with Protonix, GI follow up, and PCP follow up. F/u with H pylori stool cultures.     Interval History:   NAEON. Patient had 2.2 L of urine output in 24 hr period. Added Metolazone 2.5 mg scheduled to diureses regimen. Prn breathing treatment for SOB.         Past Medical History:   Diagnosis Date    Arthritis     CHF (congestive heart failure)     Diastolic dysfunction    Cor pulmonale 11/05/2012    Gallstones     Hypertension     Morbid obesity 11/05/2012    Obesity hypoventilation syndrome     On home oxygen therapy 03/07/2014    MALLIKA (obstructive sleep apnea)     BPAP 16/11 with 3 L at night (continuous O2).    Paroxysmal atrial fibrillation     PFO (patent foramen ovale) 11/05/2012    status post " "closure    Pickwickian syndrome 11/05/2012    Pulmonary hypertension        Past Surgical History:   Procedure Laterality Date    RIGHT HEART CATHETERIZATION Right 3/22/2022    Procedure: INSERTION, CATHETER, RIGHT HEART;  Surgeon: Tony Martínez MD;  Location: [facility] CATH LAB;  Service: Cardiology;  Laterality: Right;       Review of patient's allergies indicates:   Allergen Reactions    Lipitor [atorvastatin] Other (See Comments)     "it makes my legs feel like jelly"  Other reaction(s): causes legs to hurt       No current facility-administered medications on file prior to encounter.     Current Outpatient Medications on File Prior to Encounter   Medication Sig    acetaminophen (TYLENOL ARTHRITIS ORAL) Take 2 tablets by mouth daily as needed (pain).    albuterol (VENTOLIN HFA) 90 mcg/actuation inhaler Inhale 2 puffs into the lungs every 4 (four) hours as needed for Wheezing. Rescue    allopurinoL (ZYLOPRIM) 300 MG tablet Take 1 tablet (300 mg total) by mouth once daily.    ascorbic acid (VITAMIN C ORAL) Take 1 tablet by mouth once daily.    b complex vitamins tablet Take 1 tablet by mouth once daily.    CALCIUM ORAL Take 1 capsule by mouth once daily.    furosemide (LASIX) 40 MG tablet Take 80 mg by mouth 2 (two) times daily.    metOLazone (ZAROXOLYN) 2.5 MG tablet TAKE 1 TABLET(2.5 MG) BY MOUTH 3 TIMES A WEEK (Patient taking differently: Take 2.5 mg by mouth twice a week. Wed & Sat)    metoprolol tartrate (LOPRESSOR) 25 MG tablet TAKE 1 TABLET BY MOUTH TWICE DAILY    multivit,calc,min/FA/K1/lycop (ONE-A-DAY MEN'S COMPLETE ORAL) Take 1 tablet by mouth once daily.    omega-3 fatty acids/fish oil (FISH OIL-OMEGA-3 FATTY ACIDS) 300-1,000 mg capsule Take 1 capsule by mouth once daily.    pantoprazole (PROTONIX) 40 MG tablet Take 1 tablet (40 mg total) by mouth 2 (two) times daily. for 15 days    potassium chloride (MICRO-K) 10 MEQ CpSR TAKE 1 CAPSULE(10 MEQ) BY MOUTH EVERY DAY (Patient taking differently: 20 mEq. TAKE " 1 CAPSULE(10 MEQ) BY MOUTH EVERY DAY)    predniSONE (DELTASONE) 20 MG tablet 2 pills po daily for 4 days, then 1 pill po day for 4 days    tiotropium (SPIRIVA) 18 mcg inhalation capsule Inhale 18 mcg into the lungs once daily.    triamcinolone acetonide (NASACORT ALLERGY NASL) 2 sprays by Nasal route once daily.    warfarin (COUMADIN) 10 MG tablet TAKE 1 TABLET (10 MG) EVERY SUNDAY AND HALF A TABLET (5 MG) ON ALL OTHER DAYS AS DIRECTED BY COUMADIN CLINIC    WIXELA INHUB 250-50 mcg/dose diskus inhaler 1 puff 2 (two) times daily. USE 1 INHALATION BY MOUTH TWICE DAILY    [DISCONTINUED] sildenafil (REVATIO) 20 mg Tab Take 1 tablet (20 mg total) by mouth 3 (three) times daily.     Family History       Problem Relation (Age of Onset)    Arthritis Mother, Maternal Grandmother, Maternal Grandfather    Asthma Mother, Sister, Maternal Grandmother    Breast cancer Paternal Grandmother    Diabetes Maternal Grandmother    Down syndrome Brother    Gout Brother    Hypertension Father, Maternal Grandmother, Maternal Grandfather, Paternal Grandfather          Tobacco Use    Smoking status: Never    Smokeless tobacco: Never   Substance and Sexual Activity    Alcohol use: Yes     Comment: holidays  rare    Drug use: No    Sexual activity: Not Currently     Partners: Female     Review of Systems   Constitutional:  Positive for activity change and fatigue. Negative for appetite change, chills and fever.   HENT:  Negative for sinus pain, sore throat and trouble swallowing.    Eyes:  Negative for pain and visual disturbance.   Respiratory:  Positive for cough and shortness of breath. Negative for chest tightness and wheezing.    Cardiovascular:  Positive for palpitations and leg swelling. Negative for chest pain.   Gastrointestinal:  Positive for abdominal distention, abdominal pain (Near epigastric region) and nausea (Mildly). Negative for blood in stool, constipation, diarrhea and vomiting.   Genitourinary:  Negative for dysuria, flank  pain and hematuria.   Skin:  Negative for color change.   Neurological:  Negative for dizziness, syncope, light-headedness and headaches.   Psychiatric/Behavioral:  Negative for behavioral problems and confusion.      Objective:     Vital Signs (Most Recent):  Temp: 98.1 °F (36.7 °C) (12/13/23 1049)  Pulse: 92 (12/13/23 1105)  Resp: 16 (12/13/23 1049)  BP: (!) 95/53 (12/13/23 1049)  SpO2: (!) 92 % (12/13/23 1049) Vital Signs (24h Range):  Temp:  [97.8 °F (36.6 °C)-98.8 °F (37.1 °C)] 98.1 °F (36.7 °C)  Pulse:  [82-98] 92  Resp:  [16-20] 16  SpO2:  [90 %-96 %] 92 %  BP: ()/(53-71) 95/53     Weight: 117.5 kg (259 lb 0.7 oz)  Body mass index is 44.44 kg/m².     Physical Exam  Constitutional:       Appearance: Normal appearance. He is obese. He is not ill-appearing.   HENT:      Head: Normocephalic and atraumatic.      Nose: Nose normal. No congestion.      Mouth/Throat:      Mouth: Mucous membranes are moist.   Eyes:      Extraocular Movements: Extraocular movements intact.      Conjunctiva/sclera: Conjunctivae normal.   Cardiovascular:      Rate and Rhythm: Normal rate and regular rhythm.      Pulses: Normal pulses.      Heart sounds: Normal heart sounds. No murmur heard.     No friction rub.   Pulmonary:      Effort: Pulmonary effort is normal. No respiratory distress.      Breath sounds: Normal breath sounds. Decreased air movement present. No wheezing.   Abdominal:      General: Bowel sounds are normal. There is distension.      Palpations: Abdomen is soft.      Tenderness: There is no abdominal tenderness.   Musculoskeletal:         General: No tenderness.      Right lower leg: Edema present.      Left lower leg: Edema present.   Skin:     General: Skin is warm.      Capillary Refill: Capillary refill takes less than 2 seconds.   Neurological:      General: No focal deficit present.      Mental Status: He is alert and oriented to person, place, and time.   Psychiatric:         Mood and Affect: Mood normal.     "     Behavior: Behavior normal.                Significant Labs: All pertinent labs within the past 24 hours have been reviewed.    Significant Imaging: I have reviewed all pertinent imaging results/findings within the past 24 hours.    Assessment/Plan:      * Acute on chronic respiratory failure with hypoxia and hypercapnia  Patient with Hypoxic Respiratory failure which is Acute on chronic.  he is on home oxygen at 3 LPM. Supplemental oxygen was provided and noted- 8 LPM on admission for SOB Oxygen Concentration (%):  [40] 40    .   Signs/symptoms of respiratory failure include- increased work of breathing. Contributing diagnoses includes - CHF and Obesity Hypoventilation Labs and images were reviewed. Patient Has not had a recent ABG. Will treat underlying causes and adjust management of respiratory failure as follows-     CXR in the ED revealed " Reconfirmed pulmonary vascular congestion and bilateral interstitial and ground-glass opacities not felt significantly changed to above 2 exams and which could be on the basis of edema and or infection". Patient BNP elevated. Patient PCO2 (but chronically elevated).    Despite aggressive IV diuresis patient remains well above baseline liter flow of O2. Given this lack of improvement we have included metolazone in his management plan for HF.     PLAN:   - Lasix 60 mg IV BID   - Strict I's and O's  - CBC, CMP   - BIPAP QHS in setting of chronic respiratory failure and Kevin  - Continuous oxygen monitoring   - Continue home maintenance inhalers  - Metolazone 2.5 mg daily started 12/13/23    Severe obesity (BMI >= 40)  Body mass index is 44.44 kg/m². Morbid obesity complicates all aspects of disease management from diagnostic modalities to treatment. Weight loss encouraged and health benefits explained to patient.         Epigastric abdominal pain  Patient reports chronic epigastric pain/bloating since last February. Patient reports that he has seen his PCP who started him on " Protonix which helped relieve his symptoms mildly. Patient reports that no particular activity triggers the bloating. Patient related it sometimes to food that he has consumed but he reports that is less likely because his diet has changes. Patient has regular BM's, no increase in flatulence, and no burning sensation. Patient does report an associated belching. He does also say primary reason he has come to the ED was to have this issue worked up because it has been bothering him.     Patient reports the bloating and pain has resolved this morning. Discussed with him H pylori testing and likely will be discharged with protonix 40 mg daily and GI F/u. H pylori IgG negative, f/u with stool sample bc can't rule out.     PLAN:   - Protonix 40 mg daily   - prn compazine for nausea   - Consider imaging if patient reports worsening pain/nausea. Physical exam is benign   - F/u with H pylori stool antigen on outpatient basis if discharged       Chronic asthmatic bronchitis  Chronic and patient reports being on inhaler regimen at home. Some not on formulary.    PLAN:   - Continue home inhaler alternatives     CKD (chronic kidney disease), stage III  Creatine stable for now. BMP reviewed- noted Estimated Creatinine Clearance: 65.9 mL/min (A) (based on SCr of 1.6 mg/dL (H)). according to latest data. Based on current GFR, CKD stage is stage 3 - GFR 30-59.  Monitor UOP and serial BMP and adjust therapy as needed. Renally dose meds. Avoid nephrotoxic medications and procedures.        Acute on chronic heart failure with preserved ejection fraction (HFpEF)  Patient is identified as having Systolic (HFrEF) heart failure that is Acute on chronic. CHF is currently uncontrolled due to Continued edema of extremities. Latest ECHO performed and demonstrates- Results for orders placed during the hospital encounter of 05/02/23    Echo    Interpretation Summary  · The left ventricle is normal in size with mildly decreased systolic  "function.  · The estimated ejection fraction is 50%.  · There are segmental left ventricular wall motion abnormalities.  · There is abnormal septal wall motion.  · Left ventricular diastolic dysfunction.  · Moderate right ventricular enlargement with mildly reduced right ventricular systolic function.  · Mild right atrial enlargement.  · Normal central venous pressure (3 mmHg).  . Continue Beta Blocker and Furosemide and monitor clinical status closely. Monitor on telemetry. Patient is off CHF pathway.  Monitor strict Is&Os and daily weights.  Place on fluid restriction of 1.5 L. Cardiology has not been consulted. Continue to stress to patient importance of self efficacy and  on diet for CHF. Last BNP reviewed- and noted below   Recent Labs   Lab 12/11/23  1339   *       Despite aggressive IV diuresis patient remains well above baseline liter flow of O2. Given this lack of improvement we have included metolazone in his management plan for HF.     PLAN:   - Lasix 60 mg IV BID, Metolazone 2.5 mg daily  - Strict I's and O's   - daily cbc, cmp   - Cardiac diet   - F/u with cardio on outpatient basis     Hypercoagulability due to atrial fibrillation  Patient has prior diagnosis of A fib. Patient reports he was told at one point his blood has tendency to "pool". Discussed with patient home medication regimen. Patient reports taking coumadin and being followed by coumadin clinic     PLAN:   - Continue patient Coumadin 5 mg adjust as needed, dose on 12/13/23 adjusted to "7.5 mg" due to INR   - Daily PT-INR   - Continuous tele monitoring   - Continue Lopressor         Hypertension  Chronic, controlled. Latest blood pressure and vitals reviewed-     Temp:  [97.8 °F (36.6 °C)-98.8 °F (37.1 °C)]   Pulse:  [82-98]   Resp:  [16-20]   BP: ()/(53-71)   SpO2:  [90 %-96 %] .   Home meds for hypertension were reviewed and noted below.   Hypertension Medications               furosemide (LASIX) 40 MG tablet Take 80 " mg by mouth 2 (two) times daily.    metOLazone (ZAROXOLYN) 2.5 MG tablet TAKE 1 TABLET(2.5 MG) BY MOUTH 3 TIMES A WEEK    metoprolol tartrate (LOPRESSOR) 25 MG tablet TAKE 1 TABLET BY MOUTH TWICE DAILY            While in the hospital, will manage blood pressure as follows; Adjust home antihypertensive regimen as follows- Lasix (IV) and lopressor     Will utilize p.r.n. blood pressure medication only if patient's blood pressure greater than 180/110 and he develops symptoms such as worsening chest pain or shortness of breath.    MALLIKA (obstructive sleep apnea)  Patient reports chronic diagnosis of MALLIKA. Recently had seen Elba Maloney NP in sleep medicine clinic on 11/1/23. Patient has been on 16/11 BIPAP settings with no concern for titration. Sleep clinic recommending continuing with current settings.    PLAN:  - BIPAP QHS with 16/11 settings         VTE Risk Mitigation (From admission, onward)           Ordered     warfarin tablet 7.5 mg  Daily         12/13/23 0923                    Discharge Planning   ESTEBAN: 12/14/2023     Code Status: Prior   Is the patient medically ready for discharge?: No    Reason for patient still in hospital (select all that apply): Treatment  Discharge Plan A: Home                  Veronica Gong DO  Department of Hospital Medicine   [facility]

## 2023-12-13 NOTE — ASSESSMENT & PLAN NOTE
Patient is identified as having Systolic (HFrEF) heart failure that is Acute on chronic. CHF is currently uncontrolled due to Continued edema of extremities. Latest ECHO performed and demonstrates- Results for orders placed during the hospital encounter of 05/02/23    Echo    Interpretation Summary  · The left ventricle is normal in size with mildly decreased systolic function.  · The estimated ejection fraction is 50%.  · There are segmental left ventricular wall motion abnormalities.  · There is abnormal septal wall motion.  · Left ventricular diastolic dysfunction.  · Moderate right ventricular enlargement with mildly reduced right ventricular systolic function.  · Mild right atrial enlargement.  · Normal central venous pressure (3 mmHg).  . Continue Beta Blocker and Furosemide and monitor clinical status closely. Monitor on telemetry. Patient is off CHF pathway.  Monitor strict Is&Os and daily weights.  Place on fluid restriction of 1.5 L. Cardiology has not been consulted. Continue to stress to patient importance of self efficacy and  on diet for CHF. Last BNP reviewed- and noted below   Recent Labs   Lab 12/11/23  1339   *       Despite aggressive IV diuresis patient remains well above baseline liter flow of O2. Given this lack of improvement we have included metolazone in his management plan for HF.     PLAN:   - Lasix 60 mg IV BID, Metolazone 2.5 mg daily  - Strict I's and O's   - daily cbc, cmp   - Cardiac diet   - F/u with cardio on outpatient basis

## 2023-12-13 NOTE — ASSESSMENT & PLAN NOTE
Creatine stable for now. BMP reviewed- noted Estimated Creatinine Clearance: 65.9 mL/min (A) (based on SCr of 1.6 mg/dL (H)). according to latest data. Based on current GFR, CKD stage is stage 3 - GFR 30-59.  Monitor UOP and serial BMP and adjust therapy as needed. Renally dose meds. Avoid nephrotoxic medications and procedures.

## 2023-12-13 NOTE — ASSESSMENT & PLAN NOTE
Patient reports chronic epigastric pain/bloating since last February. Patient reports that he has seen his PCP who started him on Protonix which helped relieve his symptoms mildly. Patient reports that no particular activity triggers the bloating. Patient related it sometimes to food that he has consumed but he reports that is less likely because his diet has changes. Patient has regular BM's, no increase in flatulence, and no burning sensation. Patient does report an associated belching. He does also say primary reason he has come to the ED was to have this issue worked up because it has been bothering him.     Patient reports the bloating and pain has resolved this morning. Discussed with him H pylori testing and likely will be discharged with protonix 40 mg daily and GI F/u. H pylori IgG negative, f/u with stool sample bc can't rule out.     PLAN:   - Protonix 40 mg daily   - prn compazine for nausea   - Consider imaging if patient reports worsening pain/nausea. Physical exam is benign   - F/u with H pylori stool antigen on outpatient basis if discharged

## 2023-12-13 NOTE — ASSESSMENT & PLAN NOTE
"Patient with Hypoxic Respiratory failure which is Acute on chronic.  he is on home oxygen at 3 LPM. Supplemental oxygen was provided and noted- 8 LPM on admission for SOB Oxygen Concentration (%):  [40] 40    .   Signs/symptoms of respiratory failure include- increased work of breathing. Contributing diagnoses includes - CHF and Obesity Hypoventilation Labs and images were reviewed. Patient Has not had a recent ABG. Will treat underlying causes and adjust management of respiratory failure as follows-     CXR in the ED revealed " Reconfirmed pulmonary vascular congestion and bilateral interstitial and ground-glass opacities not felt significantly changed to above 2 exams and which could be on the basis of edema and or infection". Patient BNP elevated. Patient PCO2 (but chronically elevated).    PLAN:   - Lasix 60 mg IV BID   - Strict I's and O's  - CBC, CMP   - BIPAP QHS in setting of chronic respiratory failure and Kevin  - Continuous oxygen monitoring   - Continue home maintenance inhalers  - Metolazone 2.5 mg daily started 12/13/23  "

## 2023-12-14 LAB
ALBUMIN SERPL BCP-MCNC: 2.6 G/DL (ref 3.5–5.2)
ALP SERPL-CCNC: 95 U/L (ref 55–135)
ALT SERPL W/O P-5'-P-CCNC: 14 U/L (ref 10–44)
ANION GAP SERPL CALC-SCNC: 14 MMOL/L (ref 8–16)
AST SERPL-CCNC: 18 U/L (ref 10–40)
BASOPHILS # BLD AUTO: 0.04 K/UL (ref 0–0.2)
BASOPHILS NFR BLD: 0.5 % (ref 0–1.9)
BILIRUB SERPL-MCNC: 1.3 MG/DL (ref 0.1–1)
BUN SERPL-MCNC: 56 MG/DL (ref 6–20)
CALCIUM SERPL-MCNC: 8.8 MG/DL (ref 8.7–10.5)
CHLORIDE SERPL-SCNC: 89 MMOL/L (ref 95–110)
CO2 SERPL-SCNC: 34 MMOL/L (ref 23–29)
CREAT SERPL-MCNC: 1.8 MG/DL (ref 0.5–1.4)
DIFFERENTIAL METHOD: ABNORMAL
EOSINOPHIL # BLD AUTO: 0 K/UL (ref 0–0.5)
EOSINOPHIL NFR BLD: 0.1 % (ref 0–8)
ERYTHROCYTE [DISTWIDTH] IN BLOOD BY AUTOMATED COUNT: 21.4 % (ref 11.5–14.5)
EST. GFR  (NO RACE VARIABLE): 45.9 ML/MIN/1.73 M^2
GLUCOSE SERPL-MCNC: 98 MG/DL (ref 70–110)
HCT VFR BLD AUTO: 53.6 % (ref 40–54)
HGB BLD-MCNC: 15.9 G/DL (ref 14–18)
IMM GRANULOCYTES # BLD AUTO: 0.02 K/UL (ref 0–0.04)
IMM GRANULOCYTES NFR BLD AUTO: 0.2 % (ref 0–0.5)
INR PPP: 1.9 (ref 0.8–1.2)
LYMPHOCYTES # BLD AUTO: 1.2 K/UL (ref 1–4.8)
LYMPHOCYTES NFR BLD: 14.5 % (ref 18–48)
MAGNESIUM SERPL-MCNC: 1.8 MG/DL (ref 1.6–2.6)
MCH RBC QN AUTO: 25.4 PG (ref 27–31)
MCHC RBC AUTO-ENTMCNC: 29.7 G/DL (ref 32–36)
MCV RBC AUTO: 86 FL (ref 82–98)
MONOCYTES # BLD AUTO: 0.8 K/UL (ref 0.3–1)
MONOCYTES NFR BLD: 10 % (ref 4–15)
NEUTROPHILS # BLD AUTO: 6.2 K/UL (ref 1.8–7.7)
NEUTROPHILS NFR BLD: 74.7 % (ref 38–73)
NRBC BLD-RTO: 0 /100 WBC
PHOSPHATE SERPL-MCNC: 3.3 MG/DL (ref 2.7–4.5)
PLATELET # BLD AUTO: 203 K/UL (ref 150–450)
PMV BLD AUTO: 10.5 FL (ref 9.2–12.9)
POTASSIUM SERPL-SCNC: 3.2 MMOL/L (ref 3.5–5.1)
PROT SERPL-MCNC: 6.9 G/DL (ref 6–8.4)
PROTHROMBIN TIME: 19.3 SEC (ref 9–12.5)
RBC # BLD AUTO: 6.25 M/UL (ref 4.6–6.2)
SODIUM SERPL-SCNC: 137 MMOL/L (ref 136–145)
WBC # BLD AUTO: 8.34 K/UL (ref 3.9–12.7)

## 2023-12-14 PROCEDURE — 94660 CPAP INITIATION&MGMT: CPT | Mod: HCNC

## 2023-12-14 PROCEDURE — 85025 COMPLETE CBC W/AUTO DIFF WBC: CPT | Mod: HCNC

## 2023-12-14 PROCEDURE — 25000003 PHARM REV CODE 250: Mod: HCNC

## 2023-12-14 PROCEDURE — 94640 AIRWAY INHALATION TREATMENT: CPT | Mod: HCNC

## 2023-12-14 PROCEDURE — 94761 N-INVAS EAR/PLS OXIMETRY MLT: CPT | Mod: HCNC

## 2023-12-14 PROCEDURE — 25000003 PHARM REV CODE 250: Mod: HCNC | Performed by: HOSPITALIST

## 2023-12-14 PROCEDURE — 21400001 HC TELEMETRY ROOM: Mod: HCNC

## 2023-12-14 PROCEDURE — 36415 COLL VENOUS BLD VENIPUNCTURE: CPT | Mod: HCNC

## 2023-12-14 PROCEDURE — 85610 PROTHROMBIN TIME: CPT | Mod: HCNC

## 2023-12-14 PROCEDURE — 80053 COMPREHEN METABOLIC PANEL: CPT | Mod: HCNC

## 2023-12-14 PROCEDURE — 83735 ASSAY OF MAGNESIUM: CPT | Mod: HCNC

## 2023-12-14 PROCEDURE — 84100 ASSAY OF PHOSPHORUS: CPT | Mod: HCNC

## 2023-12-14 PROCEDURE — 99900035 HC TECH TIME PER 15 MIN (STAT): Mod: HCNC

## 2023-12-14 PROCEDURE — 27000221 HC OXYGEN, UP TO 24 HOURS: Mod: HCNC

## 2023-12-14 PROCEDURE — 63600175 PHARM REV CODE 636 W HCPCS: Mod: HCNC

## 2023-12-14 RX ORDER — POTASSIUM CHLORIDE 750 MG/1
30 CAPSULE, EXTENDED RELEASE ORAL DAILY
Status: DISCONTINUED | OUTPATIENT
Start: 2023-12-15 | End: 2023-12-15

## 2023-12-14 RX ORDER — WARFARIN SODIUM 5 MG/1
5 TABLET ORAL DAILY
Status: DISCONTINUED | OUTPATIENT
Start: 2023-12-14 | End: 2023-12-15

## 2023-12-14 RX ORDER — FUROSEMIDE 80 MG/1
80 TABLET ORAL 2 TIMES DAILY
Status: DISCONTINUED | OUTPATIENT
Start: 2023-12-14 | End: 2023-12-15 | Stop reason: HOSPADM

## 2023-12-14 RX ORDER — POTASSIUM CHLORIDE 750 MG/1
30 CAPSULE, EXTENDED RELEASE ORAL ONCE
Status: COMPLETED | OUTPATIENT
Start: 2023-12-14 | End: 2023-12-14

## 2023-12-14 RX ADMIN — METOLAZONE 2.5 MG: 2.5 TABLET ORAL at 09:12

## 2023-12-14 RX ADMIN — TIOTROPIUM BROMIDE INHALATION SPRAY 2 PUFF: 3.12 SPRAY, METERED RESPIRATORY (INHALATION) at 01:12

## 2023-12-14 RX ADMIN — BENZONATATE 200 MG: 100 CAPSULE ORAL at 08:12

## 2023-12-14 RX ADMIN — FUROSEMIDE 60 MG: 10 INJECTION, SOLUTION INTRAMUSCULAR; INTRAVENOUS at 09:12

## 2023-12-14 RX ADMIN — METOPROLOL TARTRATE 25 MG: 25 TABLET, FILM COATED ORAL at 08:12

## 2023-12-14 RX ADMIN — FLUTICASONE FUROATE AND VILANTEROL TRIFENATATE 1 PUFF: 100; 25 POWDER RESPIRATORY (INHALATION) at 01:12

## 2023-12-14 RX ADMIN — ALLOPURINOL 300 MG: 300 TABLET ORAL at 09:12

## 2023-12-14 RX ADMIN — WARFARIN SODIUM 5 MG: 5 TABLET ORAL at 06:12

## 2023-12-14 RX ADMIN — POTASSIUM CHLORIDE 30 MEQ: 10 CAPSULE, COATED, EXTENDED RELEASE ORAL at 09:12

## 2023-12-14 RX ADMIN — METOPROLOL TARTRATE 25 MG: 25 TABLET, FILM COATED ORAL at 09:12

## 2023-12-14 RX ADMIN — PANTOPRAZOLE SODIUM 40 MG: 40 TABLET, DELAYED RELEASE ORAL at 09:12

## 2023-12-14 RX ADMIN — FUROSEMIDE 80 MG: 80 TABLET ORAL at 06:12

## 2023-12-14 RX ADMIN — POLYETHYLENE GLYCOL 3350 17 G: 17 POWDER, FOR SOLUTION ORAL at 09:12

## 2023-12-14 NOTE — PLAN OF CARE
Problem: Adult Inpatient Plan of Care  Goal: Plan of Care Review  Outcome: Ongoing, Progressing  Goal: Patient-Specific Goal (Individualized)  Outcome: Ongoing, Progressing  Goal: Absence of Hospital-Acquired Illness or Injury  Outcome: Ongoing, Progressing  Goal: Optimal Comfort and Wellbeing  Outcome: Ongoing, Progressing  Goal: Readiness for Transition of Care  Outcome: Ongoing, Progressing     Problem: Bariatric Environmental Safety  Goal: Safety Maintained with Care  Outcome: Ongoing, Progressing     Problem: Impaired Wound Healing  Goal: Optimal Wound Healing  Outcome: Ongoing, Progressing     Problem: Fall Injury Risk  Goal: Absence of Fall and Fall-Related Injury  Outcome: Ongoing, Progressing

## 2023-12-14 NOTE — PROGRESS NOTES
"[facility]  Progress Note    Patient Name: Yong Mcintyre  MRN: [id number]  Patient Class: IP- Inpatient   Admission Date: 12/11/2023  Length of Stay: 1 days  Attending Physician: Augie Somers III*  Primary Care Provider: Gianni Escalona MD        Subjective:     Principal Problem:Acute on chronic respiratory failure with hypoxia and hypercapnia        HPI:  Mr. Mcintyre is a 49 y/o male with a PMH of HFrEF, HTN, pHTN, Chronic hypoxic respiratory failure (baseline oxygen req at home is 3 L), PFO (unsuccessful closure in 2006), AF (on coumadin, pt reports his "blood pools" that's why he's on it), Obesity hypoventilation syndrome, Morbid obesity presenting to the ED due to epigastric bloating that has been making feel SOB. Patient reports that this is a chronic issue that has been ongoing since February/March when he had Covid. Patient reports a few weeks or maybe a month later he has been having this epigastric bloating and associated belching that has been reoccurring. Patient reports that he visited his PCP, and suspected a role of GERD and prescribed him Protonix with little improvement at first. But when he ran out of his medication he never bothered to refill it because he felt like it never resolved his symptoms. Patient suspected it might've been 2/2 to BIPAP machine and "cleaned it thoroughly" and it resolved his symptoms mildly. Patient reports no specific association with any activity. He reports that when he eats the symptoms come on and bother him. He reports no particular change in his diet, was eating comfort foods a few months ago but transitioned his diet slowly to include more greens. Patient denies fever, N/V/D, no changes in BM, no increasing passing of gas, no chills, dysuria, flank pain, headaches, and orthopnea. Patient does admit to epigastric bloating, SOB (seems to be chronic but worsens with bloating), belching, and feeling mildly nauseous. No inciting " "event noted. Patient has been adherent to medication regimen consisting diuretics. No new sick contacts. Performs ADL's and works out.    Upon presentation to the ED, patient was noted to be in volume overloaded state. With increased oxygen requirements from baseline. Now on 8 L NC. Patient also noted to have b/t LE edema (patient reports more than usual). CXR in the ED revealed "  Reconfirmed pulmonary vascular congestion and bilateral interstitial and ground-glass opacities not felt significantly changed to above 2 exams and which could be on the basis of edema and or infection". Labs in the ED notable for elevated  (last checked it was 31 in May), Trop was 0.016 (neg), Na 131, K 3.1, CO2 39, Cr 1.9 (bl 1.4-1.5). Patient is HDS and afebrile.     Overview/Hospital Course:  Patient admitted to hospital medicine for acute on chronic respiratory failure. Patient admitted in volume overload state with increasing oxygen requirements from bl (3L) to 8 L. Patient also reported abdominal bloating and belching which resolved the next morning. 60 mg IV BID diureses regimen started with adequate urine output and reduction in b/t LE edema. H pylori workup for reported abdominal bloating and belching. Plan to discharge patient with Protonix, GI follow up, and PCP follow up. F/u with H pylori stool cultures. Prolonged diuresis time course secondary to hypotension.     Interval History: NAEON, patient interested in getting vaccines at discharge (COVID booster, flu, pneumococcal). Also interested in starting Eliquis at discharge in lieu of warfarin.    Review of Systems   Constitutional:  Negative for activity change, appetite change, chills, fatigue and fever.   HENT:  Negative for sinus pain, sore throat and trouble swallowing.    Eyes:  Negative for pain and visual disturbance.   Respiratory:  Positive for shortness of breath. Negative for cough, chest tightness and wheezing.    Cardiovascular:  Negative for chest pain, " palpitations and leg swelling.   Gastrointestinal:  Negative for abdominal distention, abdominal pain, blood in stool, constipation, diarrhea, nausea and vomiting.   Genitourinary:  Negative for dysuria, flank pain and hematuria.   Skin:  Negative for color change.   Neurological:  Negative for dizziness, syncope, light-headedness and headaches.   Psychiatric/Behavioral:  Negative for behavioral problems and confusion.      Objective:     Vital Signs (Most Recent):  Temp: 97.9 °F (36.6 °C) (12/14/23 1101)  Pulse: 84 (12/14/23 1306)  Resp: 16 (12/14/23 1306)  BP: (!) 107/55 (12/14/23 1101)  SpO2: 96 % (12/14/23 1303) Vital Signs (24h Range):  Temp:  [97.3 °F (36.3 °C)-99 °F (37.2 °C)] 97.9 °F (36.6 °C)  Pulse:  [] 84  Resp:  [16-20] 16  SpO2:  [90 %-96 %] 96 %  BP: ()/(52-66) 107/55     Weight: 117.5 kg (259 lb 0.7 oz)  Body mass index is 44.44 kg/m².    Intake/Output Summary (Last 24 hours) at 12/14/2023 1415  Last data filed at 12/14/2023 0625  Gross per 24 hour   Intake 960 ml   Output 1450 ml   Net -490 ml         Physical Exam  Vitals and nursing note reviewed.   HENT:      Head: Normocephalic and atraumatic.   Eyes:      Conjunctiva/sclera: Conjunctivae normal.      Pupils: Pupils are equal, round, and reactive to light.   Cardiovascular:      Rate and Rhythm: Normal rate and regular rhythm.      Pulses: Normal pulses.   Pulmonary:      Effort: Pulmonary effort is normal. No respiratory distress.      Breath sounds: No wheezing or rales.   Abdominal:      Palpations: Abdomen is soft.      Tenderness: There is no abdominal tenderness. There is no guarding.   Musculoskeletal:         General: Normal range of motion.      Right lower leg: Edema (improving) present.      Left lower leg: Edema (improving) present.   Skin:     General: Skin is warm and dry.   Neurological:      General: No focal deficit present.      Mental Status: He is alert and oriented to person, place, and time.   Psychiatric:         " Mood and Affect: Mood normal.             Significant Labs: All pertinent labs within the past 24 hours have been reviewed.    Significant Imaging: I have reviewed all pertinent imaging results/findings within the past 24 hours.    Assessment/Plan:      * Acute on chronic respiratory failure with hypoxia and hypercapnia  Patient with Hypoxic Respiratory failure which is Acute on chronic.  he is on home oxygen at 3 LPM. Supplemental oxygen was provided and noted- 8 LPM on admission for SOB Oxygen Concentration (%):  [40] 40    .   Signs/symptoms of respiratory failure include- increased work of breathing. Contributing diagnoses includes - CHF and Obesity Hypoventilation Labs and images were reviewed. Patient Has not had a recent ABG. Will treat underlying causes and adjust management of respiratory failure as follows-     CXR in the ED revealed " Reconfirmed pulmonary vascular congestion and bilateral interstitial and ground-glass opacities not felt significantly changed to above 2 exams and which could be on the basis of edema and or infection". Patient BNP elevated. Patient PCO2 (but chronically elevated).    Despite aggressive IV diuresis patient remains well above baseline liter flow of O2. Given this lack of improvement we have included metolazone in his management plan for HF.     PLAN:   - Lasix 60 mg IV BID   - Strict I's and O's  - CBC, CMP   - BIPAP QHS in setting of chronic respiratory failure and Kevin  - Continuous oxygen monitoring   - Continue home maintenance inhalers  - Metolazone 2.5 mg daily started 12/14/23    Severe obesity (BMI >= 40)  Body mass index is 44.44 kg/m². Morbid obesity complicates all aspects of disease management from diagnostic modalities to treatment. Weight loss encouraged and health benefits explained to patient.         Epigastric abdominal pain  Patient reports chronic epigastric pain/bloating since last February. Patient reports that he has seen his PCP who started him on " Protonix which helped relieve his symptoms mildly. Patient reports that no particular activity triggers the bloating. Patient related it sometimes to food that he has consumed but he reports that is less likely because his diet has changes. Patient has regular BM's, no increase in flatulence, and no burning sensation. Patient does report an associated belching. He does also say primary reason he has come to the ED was to have this issue worked up because it has been bothering him.     Patient reports the bloating and pain has resolved this morning. Discussed with him H pylori testing and likely will be discharged with protonix 40 mg daily and GI F/u. H pylori IgG negative, f/u with stool sample bc can't rule out.     PLAN:   - Protonix 40 mg daily   - prn compazine for nausea   - Consider imaging if patient reports worsening pain/nausea. Physical exam is benign   - F/u with H pylori stool antigen on outpatient basis if discharged       Chronic asthmatic bronchitis  Chronic and patient reports being on inhaler regimen at home. Some not on formulary.    PLAN:   - Continue home inhaler alternatives     CKD (chronic kidney disease), stage III  Creatine stable for now. BMP reviewed- noted Estimated Creatinine Clearance: 58.6 mL/min (A) (based on SCr of 1.8 mg/dL (H)). according to latest data. Based on current GFR, CKD stage is stage 3 - GFR 30-59.  Monitor UOP and serial BMP and adjust therapy as needed. Renally dose meds. Avoid nephrotoxic medications and procedures.        Acute on chronic heart failure with preserved ejection fraction (HFpEF)  Patient is identified as having Systolic (HFrEF) heart failure that is Acute on chronic. CHF is currently uncontrolled due to Continued edema of extremities. Latest ECHO performed and demonstrates- Results for orders placed during the hospital encounter of 05/02/23    Echo    Interpretation Summary  · The left ventricle is normal in size with mildly decreased systolic  "function.  · The estimated ejection fraction is 50%.  · There are segmental left ventricular wall motion abnormalities.  · There is abnormal septal wall motion.  · Left ventricular diastolic dysfunction.  · Moderate right ventricular enlargement with mildly reduced right ventricular systolic function.  · Mild right atrial enlargement.  · Normal central venous pressure (3 mmHg).  . Continue Beta Blocker and Furosemide and monitor clinical status closely. Monitor on telemetry. Patient is off CHF pathway.  Monitor strict Is&Os and daily weights.  Place on fluid restriction of 1.5 L. Cardiology has not been consulted. Continue to stress to patient importance of self efficacy and  on diet for CHF. Last BNP reviewed- and noted below   Recent Labs   Lab 12/11/23  1339   *       Despite aggressive IV diuresis patient remains well above baseline liter flow of O2. Given this lack of improvement we have included metolazone in his management plan for HF.     PLAN:   - Lasix 60 mg IV BID, Metolazone 2.5 mg daily  - Strict I's and O's   - daily cbc, cmp   - Cardiac diet   - F/u with cardio on outpatient basis     Hypercoagulability due to atrial fibrillation  Patient has prior diagnosis of A fib. Patient reports he was told at one point his blood has tendency to "pool". Discussed with patient home medication regimen. Patient reports taking coumadin and being followed by coumadin clinic     PLAN:   - Continue patient Coumadin 5 mg adjust as needed, with appreciated assistance from pharmacy  - Patient interested in transitioning to DOAC on discharge  - Daily PT-INR   - Continuous tele monitoring   - Continue Lopressor         Hypertension  Chronic, controlled. Latest blood pressure and vitals reviewed-     Temp:  [97.3 °F (36.3 °C)-99 °F (37.2 °C)]   Pulse:  []   Resp:  [16-20]   BP: ()/(52-66)   SpO2:  [90 %-96 %] .   Home meds for hypertension were reviewed and noted below.   Hypertension Medications  "              furosemide (LASIX) 40 MG tablet Take 80 mg by mouth 2 (two) times daily.    metOLazone (ZAROXOLYN) 2.5 MG tablet TAKE 1 TABLET(2.5 MG) BY MOUTH 3 TIMES A WEEK    metoprolol tartrate (LOPRESSOR) 25 MG tablet TAKE 1 TABLET BY MOUTH TWICE DAILY            While in the hospital, will manage blood pressure as follows; Adjust home antihypertensive regimen as follows- Lasix (IV) and lopressor     Will utilize p.r.n. blood pressure medication only if patient's blood pressure greater than 180/110 and he develops symptoms such as worsening chest pain or shortness of breath.    MALLIKA (obstructive sleep apnea)  Patient reports chronic diagnosis of MALLIKA. Recently had seen Elba Maloney NP in sleep medicine clinic on 11/1/23. Patient has been on 16/11 BIPAP settings with no concern for titration. Sleep clinic recommending continuing with current settings.    PLAN:  - BIPAP QHS with 16/11 settings         VTE Risk Mitigation (From admission, onward)           Ordered     warfarin (COUMADIN) tablet 5 mg  Daily         12/14/23 0912                    Discharge Planning   ESTEBAN: 12/14/2023     Code Status: Prior   Is the patient medically ready for discharge?: No    Reason for patient still in hospital (select all that apply): Treatment  Discharge Plan A: Home   Discharge Delays: None known at this time              Bright Cordova MD  Department of Hospital Medicine   [facility] Surg

## 2023-12-14 NOTE — ASSESSMENT & PLAN NOTE
Chronic, controlled. Latest blood pressure and vitals reviewed-     Temp:  [97.3 °F (36.3 °C)-99 °F (37.2 °C)]   Pulse:  []   Resp:  [16-20]   BP: ()/(52-66)   SpO2:  [90 %-96 %] .   Home meds for hypertension were reviewed and noted below.   Hypertension Medications               furosemide (LASIX) 40 MG tablet Take 80 mg by mouth 2 (two) times daily.    metOLazone (ZAROXOLYN) 2.5 MG tablet TAKE 1 TABLET(2.5 MG) BY MOUTH 3 TIMES A WEEK    metoprolol tartrate (LOPRESSOR) 25 MG tablet TAKE 1 TABLET BY MOUTH TWICE DAILY            While in the hospital, will manage blood pressure as follows; Adjust home antihypertensive regimen as follows- Lasix (IV) and lopressor     Will utilize p.r.n. blood pressure medication only if patient's blood pressure greater than 180/110 and he develops symptoms such as worsening chest pain or shortness of breath.

## 2023-12-14 NOTE — CARE UPDATE
RAPID RESPONSE NURSE ROUND       Rounding completed with charge RN, Ryan for hypotension reports diuresing with lasix and maintaining MAP > 65. Potassium and Magnesium replacedd during the day. No additional concerns verbalized at this time. Instructed to call [phone] for further concerns or assistance.

## 2023-12-14 NOTE — PLAN OF CARE
[facility] - Med Surg  Discharge Reassessment    Primary Care Provider: Gianni Escalona MD    Expected Discharge Date: 12/14/2023      Patient remains inpatient due to need for continued medical management. Patient continues to receive IV lasix 60mg every 12 hours. Will continue to folllow for post acute needs. Discharge Plan A and Plan B have been determined by review of patient's clinical status, future medical and therapeutic needs, and coverage/benefits for post-acute care in coordination with multidisciplinary team members.  Reassessment (most recent)       Discharge Reassessment - 12/14/23 1150          Discharge Reassessment    Assessment Type Discharge Planning Reassessment (P)      Did the patient's condition or plan change since previous assessment? Yes (P)      Discharge Plan discussed with: Patient (P)      Communicated ESTBEAN with patient/caregiver Yes (P)      Discharge Plan A Home (P)      Discharge Plan B Home (P)      DME Needed Upon Discharge  none (P)      Transition of Care Barriers None (P)      Why the patient remains in the hospital Requires continued medical care (P)         Post-Acute Status    Coverage [employer] (P)      Discharge Delays None known at this time (P)                      JAIME Calderón  Case Management  [phone]

## 2023-12-14 NOTE — SUBJECTIVE & OBJECTIVE
Interval History: NAEON, patient interested in getting vaccines at discharge (COVID booster, flu, pneumococcal). Also interested in starting Eliquis at discharge in lieu of warfarin.    Review of Systems   Constitutional:  Negative for activity change, appetite change, chills, fatigue and fever.   HENT:  Negative for sinus pain, sore throat and trouble swallowing.    Eyes:  Negative for pain and visual disturbance.   Respiratory:  Positive for shortness of breath. Negative for cough, chest tightness and wheezing.    Cardiovascular:  Negative for chest pain, palpitations and leg swelling.   Gastrointestinal:  Negative for abdominal distention, abdominal pain, blood in stool, constipation, diarrhea, nausea and vomiting.   Genitourinary:  Negative for dysuria, flank pain and hematuria.   Skin:  Negative for color change.   Neurological:  Negative for dizziness, syncope, light-headedness and headaches.   Psychiatric/Behavioral:  Negative for behavioral problems and confusion.      Objective:     Vital Signs (Most Recent):  Temp: 97.9 °F (36.6 °C) (12/14/23 1101)  Pulse: 84 (12/14/23 1306)  Resp: 16 (12/14/23 1306)  BP: (!) 107/55 (12/14/23 1101)  SpO2: 96 % (12/14/23 1303) Vital Signs (24h Range):  Temp:  [97.3 °F (36.3 °C)-99 °F (37.2 °C)] 97.9 °F (36.6 °C)  Pulse:  [] 84  Resp:  [16-20] 16  SpO2:  [90 %-96 %] 96 %  BP: ()/(52-66) 107/55     Weight: 117.5 kg (259 lb 0.7 oz)  Body mass index is 44.44 kg/m².    Intake/Output Summary (Last 24 hours) at 12/14/2023 1415  Last data filed at 12/14/2023 0625  Gross per 24 hour   Intake 960 ml   Output 1450 ml   Net -490 ml         Physical Exam  Vitals and nursing note reviewed.   HENT:      Head: Normocephalic and atraumatic.   Eyes:      Conjunctiva/sclera: Conjunctivae normal.      Pupils: Pupils are equal, round, and reactive to light.   Cardiovascular:      Rate and Rhythm: Normal rate and regular rhythm.      Pulses: Normal pulses.   Pulmonary:      Effort:  Pulmonary effort is normal. No respiratory distress.      Breath sounds: No wheezing or rales.   Abdominal:      Palpations: Abdomen is soft.      Tenderness: There is no abdominal tenderness. There is no guarding.   Musculoskeletal:         General: Normal range of motion.      Right lower leg: Edema (improving) present.      Left lower leg: Edema (improving) present.   Skin:     General: Skin is warm and dry.   Neurological:      General: No focal deficit present.      Mental Status: He is alert and oriented to person, place, and time.   Psychiatric:         Mood and Affect: Mood normal.             Significant Labs: All pertinent labs within the past 24 hours have been reviewed.    Significant Imaging: I have reviewed all pertinent imaging results/findings within the past 24 hours.

## 2023-12-14 NOTE — ASSESSMENT & PLAN NOTE
Creatine stable for now. BMP reviewed- noted Estimated Creatinine Clearance: 58.6 mL/min (A) (based on SCr of 1.8 mg/dL (H)). according to latest data. Based on current GFR, CKD stage is stage 3 - GFR 30-59.  Monitor UOP and serial BMP and adjust therapy as needed. Renally dose meds. Avoid nephrotoxic medications and procedures.

## 2023-12-15 VITALS
BODY MASS INDEX: 44.23 KG/M2 | TEMPERATURE: 98 F | SYSTOLIC BLOOD PRESSURE: 108 MMHG | HEART RATE: 96 BPM | WEIGHT: 259.06 LBS | HEIGHT: 64 IN | DIASTOLIC BLOOD PRESSURE: 63 MMHG | RESPIRATION RATE: 16 BRPM | OXYGEN SATURATION: 95 %

## 2023-12-15 LAB
ALBUMIN SERPL BCP-MCNC: 2.5 G/DL (ref 3.5–5.2)
ALP SERPL-CCNC: 99 U/L (ref 55–135)
ALT SERPL W/O P-5'-P-CCNC: 13 U/L (ref 10–44)
ANION GAP SERPL CALC-SCNC: 14 MMOL/L (ref 8–16)
AST SERPL-CCNC: 19 U/L (ref 10–40)
BASOPHILS # BLD AUTO: 0.07 K/UL (ref 0–0.2)
BASOPHILS NFR BLD: 0.8 % (ref 0–1.9)
BILIRUB SERPL-MCNC: 1.2 MG/DL (ref 0.1–1)
BUN SERPL-MCNC: 56 MG/DL (ref 6–20)
CALCIUM SERPL-MCNC: 9 MG/DL (ref 8.7–10.5)
CHLORIDE SERPL-SCNC: 89 MMOL/L (ref 95–110)
CO2 SERPL-SCNC: 35 MMOL/L (ref 23–29)
CREAT SERPL-MCNC: 1.5 MG/DL (ref 0.5–1.4)
DIFFERENTIAL METHOD: ABNORMAL
EOSINOPHIL # BLD AUTO: 0 K/UL (ref 0–0.5)
EOSINOPHIL NFR BLD: 0.1 % (ref 0–8)
ERYTHROCYTE [DISTWIDTH] IN BLOOD BY AUTOMATED COUNT: 20.8 % (ref 11.5–14.5)
EST. GFR  (NO RACE VARIABLE): 57.1 ML/MIN/1.73 M^2
GLUCOSE SERPL-MCNC: 89 MG/DL (ref 70–110)
HCT VFR BLD AUTO: 55.6 % (ref 40–54)
HGB BLD-MCNC: 16.1 G/DL (ref 14–18)
IMM GRANULOCYTES # BLD AUTO: 0.02 K/UL (ref 0–0.04)
IMM GRANULOCYTES NFR BLD AUTO: 0.2 % (ref 0–0.5)
INR PPP: 1.7 (ref 0.8–1.2)
LYMPHOCYTES # BLD AUTO: 1.7 K/UL (ref 1–4.8)
LYMPHOCYTES NFR BLD: 18.9 % (ref 18–48)
MAGNESIUM SERPL-MCNC: 1.6 MG/DL (ref 1.6–2.6)
MCH RBC QN AUTO: 25.6 PG (ref 27–31)
MCHC RBC AUTO-ENTMCNC: 29 G/DL (ref 32–36)
MCV RBC AUTO: 88 FL (ref 82–98)
MONOCYTES # BLD AUTO: 0.7 K/UL (ref 0.3–1)
MONOCYTES NFR BLD: 8.1 % (ref 4–15)
NEUTROPHILS # BLD AUTO: 6.4 K/UL (ref 1.8–7.7)
NEUTROPHILS NFR BLD: 71.9 % (ref 38–73)
NRBC BLD-RTO: 0 /100 WBC
PHOSPHATE SERPL-MCNC: 3.3 MG/DL (ref 2.7–4.5)
PLATELET # BLD AUTO: 205 K/UL (ref 150–450)
PMV BLD AUTO: 10.2 FL (ref 9.2–12.9)
POTASSIUM SERPL-SCNC: 3.1 MMOL/L (ref 3.5–5.1)
PROT SERPL-MCNC: 6.8 G/DL (ref 6–8.4)
PROTHROMBIN TIME: 17.6 SEC (ref 9–12.5)
RBC # BLD AUTO: 6.3 M/UL (ref 4.6–6.2)
SODIUM SERPL-SCNC: 138 MMOL/L (ref 136–145)
WBC # BLD AUTO: 8.94 K/UL (ref 3.9–12.7)

## 2023-12-15 PROCEDURE — 63600175 PHARM REV CODE 636 W HCPCS: Mod: HCNC | Performed by: FAMILY MEDICINE

## 2023-12-15 PROCEDURE — 94761 N-INVAS EAR/PLS OXIMETRY MLT: CPT | Mod: HCNC

## 2023-12-15 PROCEDURE — 36415 COLL VENOUS BLD VENIPUNCTURE: CPT | Mod: HCNC

## 2023-12-15 PROCEDURE — G0008 ADMIN INFLUENZA VIRUS VAC: HCPCS | Mod: HCNC | Performed by: FAMILY MEDICINE

## 2023-12-15 PROCEDURE — 25000003 PHARM REV CODE 250: Mod: HCNC

## 2023-12-15 PROCEDURE — 90471 IMMUNIZATION ADMIN: CPT | Mod: HCNC | Performed by: FAMILY MEDICINE

## 2023-12-15 PROCEDURE — 94640 AIRWAY INHALATION TREATMENT: CPT | Mod: HCNC

## 2023-12-15 PROCEDURE — 85610 PROTHROMBIN TIME: CPT | Mod: HCNC

## 2023-12-15 PROCEDURE — 27000221 HC OXYGEN, UP TO 24 HOURS: Mod: HCNC

## 2023-12-15 PROCEDURE — 63600175 PHARM REV CODE 636 W HCPCS: Mod: HCNC

## 2023-12-15 PROCEDURE — 80053 COMPREHEN METABOLIC PANEL: CPT | Mod: HCNC

## 2023-12-15 PROCEDURE — 90686 IIV4 VACC NO PRSV 0.5 ML IM: CPT | Mod: HCNC | Performed by: FAMILY MEDICINE

## 2023-12-15 PROCEDURE — 84100 ASSAY OF PHOSPHORUS: CPT | Mod: HCNC

## 2023-12-15 PROCEDURE — 83735 ASSAY OF MAGNESIUM: CPT | Mod: HCNC

## 2023-12-15 PROCEDURE — 85025 COMPLETE CBC W/AUTO DIFF WBC: CPT | Mod: HCNC

## 2023-12-15 PROCEDURE — 99900035 HC TECH TIME PER 15 MIN (STAT): Mod: HCNC

## 2023-12-15 RX ORDER — WARFARIN 10 MG/1
TABLET ORAL
Qty: 30 TABLET | Refills: 3 | Status: SHIPPED | OUTPATIENT
Start: 2023-12-15

## 2023-12-15 RX ORDER — METOLAZONE 2.5 MG/1
TABLET ORAL
Qty: 30 TABLET | Refills: 1 | Status: ON HOLD | OUTPATIENT
Start: 2023-12-15 | End: 2024-02-02 | Stop reason: HOSPADM

## 2023-12-15 RX ORDER — WARFARIN SODIUM 5 MG/1
5 TABLET ORAL DAILY
Status: DISCONTINUED | OUTPATIENT
Start: 2023-12-16 | End: 2023-12-15 | Stop reason: HOSPADM

## 2023-12-15 RX ORDER — MAGNESIUM SULFATE HEPTAHYDRATE 40 MG/ML
2 INJECTION, SOLUTION INTRAVENOUS ONCE
Status: COMPLETED | OUTPATIENT
Start: 2023-12-15 | End: 2023-12-15

## 2023-12-15 RX ORDER — POTASSIUM CHLORIDE 750 MG/1
20 CAPSULE, EXTENDED RELEASE ORAL 2 TIMES DAILY
Qty: 56 CAPSULE | Refills: 0 | Status: SHIPPED | OUTPATIENT
Start: 2023-12-15 | End: 2023-12-29

## 2023-12-15 RX ORDER — PANTOPRAZOLE SODIUM 40 MG/1
40 TABLET, DELAYED RELEASE ORAL DAILY
Qty: 90 TABLET | Refills: 3 | Status: SHIPPED | OUTPATIENT
Start: 2023-12-16

## 2023-12-15 RX ORDER — POTASSIUM CHLORIDE 20 MEQ/1
40 TABLET, EXTENDED RELEASE ORAL
Status: COMPLETED | OUTPATIENT
Start: 2023-12-15 | End: 2023-12-15

## 2023-12-15 RX ORDER — WARFARIN 10 MG/1
TABLET ORAL
Qty: 30 TABLET | Refills: 1 | Status: SHIPPED | OUTPATIENT
Start: 2023-12-15 | End: 2023-12-15

## 2023-12-15 RX ADMIN — WARFARIN SODIUM 7.5 MG: 2.5 TABLET ORAL at 12:12

## 2023-12-15 RX ADMIN — ALLOPURINOL 300 MG: 300 TABLET ORAL at 08:12

## 2023-12-15 RX ADMIN — POTASSIUM CHLORIDE 40 MEQ: 1500 TABLET, EXTENDED RELEASE ORAL at 10:12

## 2023-12-15 RX ADMIN — FLUTICASONE FUROATE AND VILANTEROL TRIFENATATE 1 PUFF: 100; 25 POWDER RESPIRATORY (INHALATION) at 07:12

## 2023-12-15 RX ADMIN — PANTOPRAZOLE SODIUM 40 MG: 40 TABLET, DELAYED RELEASE ORAL at 08:12

## 2023-12-15 RX ADMIN — POTASSIUM CHLORIDE 40 MEQ: 1500 TABLET, EXTENDED RELEASE ORAL at 08:12

## 2023-12-15 RX ADMIN — TIOTROPIUM BROMIDE INHALATION SPRAY 2 PUFF: 3.12 SPRAY, METERED RESPIRATORY (INHALATION) at 07:12

## 2023-12-15 RX ADMIN — POLYETHYLENE GLYCOL 3350 17 G: 17 POWDER, FOR SOLUTION ORAL at 08:12

## 2023-12-15 RX ADMIN — MAGNESIUM SULFATE HEPTAHYDRATE 2 G: 40 INJECTION, SOLUTION INTRAVENOUS at 10:12

## 2023-12-15 RX ADMIN — METOPROLOL TARTRATE 25 MG: 25 TABLET, FILM COATED ORAL at 08:12

## 2023-12-15 RX ADMIN — METOLAZONE 2.5 MG: 2.5 TABLET ORAL at 08:12

## 2023-12-15 RX ADMIN — INFLUENZA VIRUS VACCINE 0.5 ML: 15; 15; 15; 15 SUSPENSION INTRAMUSCULAR at 03:12

## 2023-12-15 RX ADMIN — FUROSEMIDE 80 MG: 80 TABLET ORAL at 08:12

## 2023-12-15 NOTE — PROGRESS NOTES
Heart Failure Transitional [facility] (HFT[facility]) Team notified of pt referral via Ambulatory Referral to Heart Failure Transitional Care ([id number]).    Patient screened today December 18, 2023 by provider and RN for enrollment to program.      Pt was deemed not a candidate for enrollment at this time related to patient refused. Pt will be travelling for the holidays. He will not be available to attend the clinic.      Pt will require additional follow up planning per primary team.     If pt status, diagnosis, or treatment plan changes , please place AMB referral to Heart Failure Transitional Care Clinic ([id number]) for HFTC enrollment re-evalution.

## 2023-12-15 NOTE — PROGRESS NOTES
[facility] Anticoagulation Management Program    12/15/2023 11:11 AM    Assessment/Plan:    Patient presents today with subtherapeutic  INR.    Assessment of patient findings and chart review: pt recently admitted -12/15 with chief complaint of abdominal pain and respiratory failure. Warfarin continued throughout admission. Also, of note patient expressed interest in changing to DOAC - will not change at this time due to cost.    Recommendation for patient's warfarin regimen:  resume warfarin 5 mg daily as prescribed at discharge    Recommend repeat INR on Monday  _________________________________________________________________    Yong Taylor Miguelina (48 y.o.) is followed by the [facility] Anticoagulation Management Program.    Anticoagulation Summary  As of 2023      INR goal:  2.0-3.0   TTR:  54.1 % (11.1 y)   INR used for dosin.3 (2023)   Warfarin maintenance plan:  5 mg (10 mg x 0.5) every day   Weekly warfarin total:  35 mg   Plan last modified:  Shanae Chinchilla, PharmD (12/15/2023)   Next INR check:  2023   Target end date:  Indefinite    Indications    Chronic pulmonary heart disease  unspecified [I27.9]                 Anticoagulation Episode Summary       INR check location:  Clinic Lab    Preferred lab:  [facility]    Send INR reminders to:  [facility] COUMADIN MONITORING POOL    Comments:  [facility]//Infusion Suite, every other Mon -- ph [phone]// <VENIPUNCTURE ONLY (POC/meter does not work for pt)>          Anticoagulation Care Providers       Provider Role Specialty Phone number    Gianni Escalona MD [facility] Internal Medicine [phone]            Patient Findings       Positives:  Emergency department visit, Other complaints    Negatives:  Signs/symptoms of thrombosis, Signs/symptoms of bleeding, Laboratory test error suspected, Change in health, Change in alcohol use, Change in activity, Upcoming invasive procedure, Upcoming dental  procedure, Missed doses, Extra doses, Change in medications, Change in diet/appetite, Hospital admission, Bruising    Comments:  Pt is in the ER for bloated stomach hurting (NOW), swelling in legs and thighs are swollen and was not able to verify dose to to not being home

## 2023-12-15 NOTE — PLAN OF CARE
APPOINTMENT:    Patient Appointment(s) scheduled with Gianni Escalona MD  Friday Dec 22, 2023 10:30 AM

## 2023-12-15 NOTE — DISCHARGE INSTRUCTIONS
Blood pressure on low side throughout admission. Check your blood pressure daily. Write down blood pressure in a journal. Bring this journal to primary care physician for further medication adjustments.    Continue with a 1.5-1.8 L fluid restriction, 2 g sodium restriction, and check your weight daily. If you notice 3 or more lb weight gain in 24 hours, or 5 or more lb weight gain in 1 week, take an additional 80 mg Lasix. Contact PCP and discuss medication regimen if you find that you are needing additional Lasix.     Take Potassium 20 meq twice daily. Repeat labs ordered for Mon 12/18.

## 2023-12-15 NOTE — PROGRESS NOTES
Spoke to patient regarding discharge medication dosing. A discharge warfarin dose of 5 mg confirmed with patient. INR scheduled for 12.18.23.

## 2023-12-15 NOTE — PLAN OF CARE
Problem: Adult Inpatient Plan of Care  Goal: Plan of Care Review  Outcome: Ongoing, Progressing  Goal: Patient-Specific Goal (Individualized)  Outcome: Ongoing, Progressing  Goal: Absence of Hospital-Acquired Illness or Injury  Outcome: Ongoing, Progressing  Goal: Optimal Comfort and Wellbeing  Outcome: Ongoing, Progressing  Goal: Readiness for Transition of Care  Outcome: Ongoing, Progressing     Problem: Bariatric Environmental Safety  Goal: Safety Maintained with Care  Outcome: Ongoing, Progressing     Problem: Impaired Wound Healing  Goal: Optimal Wound Healing  Outcome: Ongoing, Progressing     Problem: Fall Injury Risk  Goal: Absence of Fall and Fall-Related Injury  Outcome: Ongoing, Progressing     Pt AAO x 4. Patient rested comfortably with no significant changes during the shift, remains free from falls and injuries.  Side rails up x2, bed low and locked, call light and personal belongings within reach. VS remain stable and respirations even and unlabored. No grimace, cries or other signs of distress. Questions and concerns addressed and answered. Medication compliance. Pt remains on continuous cardiac monitoring. HOB and pillows adjusted for comfort. Reviewed importance of Safety barriers and calling for assistance prn. Verbalized understanding and states he will call prn for needs.

## 2023-12-15 NOTE — DISCHARGE SUMMARY
"[facility]  Discharge Summary      Patient Name: Yong Mcintyre  MRN: [id number]  HUEY: [id number]  Patient Class: IP- Inpatient  Admission Date: 12/11/2023  Hospital Length of Stay: 2 days  Discharge Date and Time:  12/15/2023 1:56 PM  Attending Physician: Augie Somers III*   Discharging Provider: Veronica Gong DO  Primary Care Provider: Gianni Escalona MD  [facility] Medicine Team: [facility] 4 Veronica Gong DO  Primary Care Team: [facility] 4    HPI:   Mr. Mcintyre is a 49 y/o male with a PMH of HFrEF, HTN, pHTN, Chronic hypoxic respiratory failure (baseline oxygen req at home is 3 L), PFO (unsuccessful closure in 2006), AF (on coumadin, pt reports his "blood pools" that's why he's on it), Obesity hypoventilation syndrome, Morbid obesity presenting to the ED due to epigastric bloating that has been making feel SOB. Patient reports that this is a chronic issue that has been ongoing since February/March when he had Covid. Patient reports a few weeks or maybe a month later he has been having this epigastric bloating and associated belching that has been reoccurring. Patient reports that he visited his PCP, and suspected a role of GERD and prescribed him Protonix with little improvement at first. But when he ran out of his medication he never bothered to refill it because he felt like it never resolved his symptoms. Patient suspected it might've been 2/2 to BIPAP machine and "cleaned it thoroughly" and it resolved his symptoms mildly. Patient reports no specific association with any activity. He reports that when he eats the symptoms come on and bother him. He reports no particular change in his diet, was eating comfort foods a few months ago but transitioned his diet slowly to include more greens. Patient denies fever, N/V/D, no changes in BM, no increasing passing of gas, no chills, dysuria, flank pain, headaches, and orthopnea. Patient does admit to epigastric bloating, SOB " "(seems to be chronic but worsens with bloating), belching, and feeling mildly nauseous. No inciting event noted. Patient has been adherent to medication regimen consisting diuretics. No new sick contacts. Performs ADL's and works out.    Upon presentation to the ED, patient was noted to be in volume overloaded state. With increased oxygen requirements from baseline. Now on 8 L NC. Patient also noted to have b/t LE edema (patient reports more than usual). CXR in the ED revealed "  Reconfirmed pulmonary vascular congestion and bilateral interstitial and ground-glass opacities not felt significantly changed to above 2 exams and which could be on the basis of edema and or infection". Labs in the ED notable for elevated  (last checked it was 31 in May), Trop was 0.016 (neg), Na 131, K 3.1, CO2 39, Cr 1.9 (bl 1.4-1.5). Patient is HDS and afebrile.     * No surgery found *      Hospital Course:   Patient admitted to hospital medicine for acute on chronic respiratory failure. Patient admitted in volume overload state with increasing oxygen requirements from bl (3L) to 8 L. Patient also reported abdominal bloating and belching which resolved the next morning. 60 mg IV BID diureses regimen started with adequate urine output and reduction in b/t LE edema. H pylori workup for reported abdominal bloating and belching. Plan to discharge patient with Protonix, GI follow up, and PCP follow up. F/u with H pylori stool cultures. Prolonged diuresis time course secondary to hypotension. Patient to be discharged with strict instructions to follow medication regimen and strict cardiac diet. Patient reported not taking Metolazone as prescribed, discussed with patient adherence to regimen as this may cause him to retain fluid. Discussed with patient to take Metolazone 2.5 mg daily for 7 days until patient is followed up either in cardiology or PCP clinic with follow up labs (CMP). Patient also was informed that Eliquis may be better " alternative to Warfarin and to discuss it with Cardiologist as cost may be barrier to receiving medication. Patient received Flu vaccine during hospital stay and encouraged to receive COVID booster at outpatient pharmacy or clinic. Patient also given instructions to weigh himself, and possible need to take another lasix dose if he finds himself gaining weight suddenly, SOB, and exhibiting leg swelling.. Ordered CMP and magnesium to be done 12/18/23 with follow up with PCP, cardiology, pulmonology, and GI. Patient verbalized understanding and had no further questions.       Goals of Care Treatment Preferences:  Code Status: Full Code        Vitals:    12/15/23 1122   BP:    Pulse: 96   Resp:    Temp:       Physical Exam  Vitals and nursing note reviewed.   HENT:      Head: Normocephalic and atraumatic.   Eyes:      Conjunctiva/sclera: Conjunctivae normal.      Pupils: Pupils are equal, round, and reactive to light.   Cardiovascular:      Rate and Rhythm: Normal rate and regular rhythm.      Pulses: Normal pulses.   Pulmonary:      Effort: Pulmonary effort is normal. No respiratory distress.      Breath sounds: No wheezing or rales.   Abdominal:      Palpations: Abdomen is soft.      Tenderness: There is no abdominal tenderness. There is no guarding.   Musculoskeletal:         General: Normal range of motion.      Right lower leg: Edema (improving) present.      Left lower leg: Edema (improving) present.   Skin:     General: Skin is warm and dry.   Neurological:      General: No focal deficit present.      Mental Status: He is alert and oriented to person, place, and time.   Psychiatric:         Mood and Affect: Mood normal.     Consults:     No new Assessment & Plan notes have been filed under this hospital service since the last note was generated.  Service: Hospital Medicine    Final Active Diagnoses:    Diagnosis Date Noted POA    PRINCIPAL PROBLEM:  Acute on chronic respiratory failure with hypoxia and  hypercapnia [J96.21, J96.22] 02/18/2014 Yes    Severe obesity (BMI >= 40) [E66.01] 12/12/2023 Yes    CKD (chronic kidney disease), stage III [N18.30] 12/11/2023 Yes    Chronic asthmatic bronchitis [J44.89] 12/11/2023 Yes    Epigastric abdominal pain [R10.13] 12/11/2023 Unknown    Acute on chronic heart failure with preserved ejection fraction (HFpEF) [I50.33] 05/03/2023 Yes    Hypercoagulability due to atrial fibrillation [D68.69, I48.91] 04/13/2016 Yes    Hypertension [I10]  Yes    MALLIKA (obstructive sleep apnea) [G47.33]  Yes      Problems Resolved During this Admission:       Discharged Condition: stable    Disposition: Home or Self Care    Follow Up:   Follow-up Information       Gianni Escalona MD Follow up.    Specialty: Internal Medicine  Contact information:  [address]  [facility] [address], [address]  [place] [address]  [phone]               [facility] CARDIOLOGY Follow up.    Specialty: Cardiology  Contact information:  [place] [facility] [address]  [phone]             [facility] GASTROENTEROLOGY Follow up.    Specialty: Gastroenterology  Contact information:  [address] [place] [address]  [phone]                         Patient Instructions:      Comprehensive metabolic panel   Standing Status: Future Standing Exp. Date: 02/12/25     Magnesium   Standing Status: Future Standing Exp. Date: 02/12/25     ACCEPT - Ambulatory referral/consult to [facility] Failure [facility]   Standing Status: Future   Referral Priority: Routine Referral Type: Consultation   Referral Reason: Specialty Services Required   Requested Specialty: Cardiology   Number of Visits Requested: 1     Ambulatory referral/consult to Cardiology   Standing Status: Future   Referral Priority: Routine Referral Type: Consultation   Referral Reason: Specialty Services Required   Requested Specialty: Cardiology   Number of Visits Requested: 1     Ambulatory referral/consult to Gastroenterology    Standing Status: Future   Referral Priority: Routine Referral Type: Consultation   Referral Reason: Specialty Services Required   Requested Specialty: Gastroenterology   Number of Visits Requested: 1     Diet Cardiac     Notify your health care provider if you experience any of the following:  temperature >100.4     Notify your health care provider if you experience any of the following:  persistent nausea and vomiting or diarrhea     Notify your health care provider if you experience any of the following:  difficulty breathing or increased cough     Notify your health care provider if you experience any of the following:  increased confusion or weakness     Activity as tolerated       Significant Diagnostic Studies: N/A    Pending Diagnostic Studies:       Procedure Component Value Units Date/Time    H. pylori antigen, stool [[id number]] Collected: 12/13/23 0903    Order Status: Sent Lab Status: In process Updated: 12/13/23 0955    Specimen: Stool            Medications:  Reconciled Home Medications:      Medication List        START taking these medications      pantoprazole 40 MG tablet  Commonly known as: PROTONIX  Take 1 tablet (40 mg total) by mouth once daily.  Start taking on: December 16, 2023            CHANGE how you take these medications      metOLazone 2.5 MG tablet  Commonly known as: ZAROXOLYN  TAKE 1 TABLET(2.5 MG) BY MOUTH DAILY UNTIL YOU ARE SEEN BY PCP OR CARDIOLOGY.  What changed: See the new instructions.     warfarin 10 MG tablet  Commonly known as: COUMADIN  Take 1/2 tablet (5 mg) by mouth daily or as directed by Coumadin Clinic  What changed: additional instructions            CONTINUE taking these medications      albuterol 90 mcg/actuation inhaler  Commonly known as: VENTOLIN HFA  Inhale 2 puffs into the lungs every 4 (four) hours as needed for Wheezing. Rescue     allopurinoL 300 MG tablet  Commonly known as: ZYLOPRIM  Take 1 tablet (300 mg total) by mouth once daily.     b complex  vitamins tablet  Take 1 tablet by mouth once daily.     CALCIUM ORAL  Take 1 capsule by mouth once daily.     fish oil-omega-3 fatty acids 300-1,000 mg capsule  Take 1 capsule by mouth once daily.     furosemide 40 MG tablet  Commonly known as: LASIX  Take 80 mg by mouth 2 (two) times daily.     metoprolol tartrate 25 MG tablet  Commonly known as: LOPRESSOR  TAKE 1 TABLET BY MOUTH TWICE DAILY     montelukast 10 mg tablet  Commonly known as: SINGULAIR  Take 10 mg by mouth every evening.     ONE-A-DAY MEN'S COMPLETE ORAL  Take 1 tablet by mouth once daily.     potassium chloride 10 MEQ Cpsr  Commonly known as: MICRO-K  Take 2 capsules (20 mEq total) by mouth 2 (two) times daily. for 14 days     tiotropium 18 mcg inhalation capsule  Commonly known as: SPIRIVA  Inhale 18 mcg into the lungs once daily.     TYLENOL ARTHRITIS ORAL  Take 2 tablets by mouth daily as needed (pain).     VITAMIN C ORAL  Take 1 tablet by mouth once daily.     WIXELA INHUB 250-50 mcg/dose diskus inhaler  Generic drug: fluticasone-salmeterol 250-50 mcg/dose  1 puff 2 (two) times daily. USE 1 INHALATION BY MOUTH TWICE DAILY              Indwelling Lines/Drains at time of discharge:   Lines/Drains/Airways       None                   Time spent on the discharge of patient: 35 minutes         Veronica Gong DO  Department of Hospital Medicine  [facility]

## 2023-12-15 NOTE — PLAN OF CARE
Problem: Fall Injury Risk  Goal: Absence of Fall and Fall-Related Injury  Outcome: Ongoing, Progressing     Problem: Impaired Wound Healing  Goal: Optimal Wound Healing  Outcome: Ongoing, Progressing     Problem: Bariatric Environmental Safety  Goal: Safety Maintained with Care  Outcome: Ongoing, Progressing

## 2023-12-15 NOTE — PROGRESS NOTES
D/C instructions reviewed with patient, questions answered and copy of instructions given to patient who verbalized understanding. D/C medications delivered to bedside. Patient declined w/c transportation and ambulated off unit on own portable oxygen.

## 2023-12-18 ENCOUNTER — PATIENT OUTREACH (OUTPATIENT)
Dept: ADMINISTRATIVE | Facility: CLINIC | Age: 48
End: 2023-12-18
Payer: MEDICARE

## 2023-12-18 ENCOUNTER — LAB VISIT (OUTPATIENT)
Dept: LAB | Facility: HOSPITAL | Age: 48
End: 2023-12-18
Attending: INTERNAL MEDICINE
Payer: MEDICARE

## 2023-12-18 ENCOUNTER — EPISODE CHANGES (OUTPATIENT)
Dept: CARDIOLOGY | Facility: CLINIC | Age: 48
End: 2023-12-18

## 2023-12-18 DIAGNOSIS — I27.9 CHRONIC PULMONARY HEART DISEASE: ICD-10-CM

## 2023-12-18 DIAGNOSIS — Z79.01 LONG TERM CURRENT USE OF ANTICOAGULANT THERAPY: ICD-10-CM

## 2023-12-18 LAB
INR PPP: 2.6 (ref 0.8–1.2)
PROTHROMBIN TIME: 26.8 SEC (ref 9–12.5)

## 2023-12-18 PROCEDURE — 36415 COLL VENOUS BLD VENIPUNCTURE: CPT | Mod: HCNC | Performed by: INTERNAL MEDICINE

## 2023-12-18 PROCEDURE — 85610 PROTHROMBIN TIME: CPT | Mod: HCNC | Performed by: INTERNAL MEDICINE

## 2023-12-18 NOTE — PROGRESS NOTES
C3 nurse attempted to contact Yong Mcintyre for a TCC post hospital discharge follow up call. No answer. Left voicemail with callback information. The patient has a scheduled HOSFU appointment with Gianni Escalona MD on 12/22/2023 @ 1030.

## 2023-12-19 ENCOUNTER — ANTI-COAG VISIT (OUTPATIENT)
Dept: CARDIOLOGY | Facility: CLINIC | Age: 48
End: 2023-12-19
Payer: MEDICARE

## 2023-12-19 ENCOUNTER — PATIENT MESSAGE (OUTPATIENT)
Dept: ADMINISTRATIVE | Facility: CLINIC | Age: 48
End: 2023-12-19
Payer: MEDICARE

## 2023-12-19 DIAGNOSIS — I27.9 CHRONIC PULMONARY HEART DISEASE: Primary | ICD-10-CM

## 2023-12-19 PROCEDURE — 93793 ANTICOAG MGMT PT WARFARIN: CPT | Mod: S$GLB,,, | Performed by: PHARMACIST

## 2023-12-19 PROCEDURE — 93793 PR ANTICOAGULANT MGMT FOR PT TAKING WARFARIN: ICD-10-PCS | Mod: S$GLB,,, | Performed by: PHARMACIST

## 2023-12-19 NOTE — PROGRESS NOTES
Patient was admitted to [facility]  12/11-12/15  Hospital Course:   Patient admitted to hospital medicine for acute on chronic respiratory failure. Patient admitted in volume overload state with increasing oxygen requirements. New med on discharge=protonix.

## 2023-12-19 NOTE — PROGRESS NOTES
C3 nurse spoke with Yong Mcintyre for a TCC post hospital discharge follow up call. The patient has a scheduled HOSFU appointment with Gianni Escalona MD on 12/22/2023 @ 1030.

## 2023-12-19 NOTE — PROGRESS NOTES
C3 nurse attempted to contact Yong Mcintyre for a TCC post hospital discharge follow up call. The patient is unable to conduct the call @ this time. The patient requested a callback.    The patient has a scheduled HOSFU appointment with Gianni Escalona MD on 12/22/2023 @ 1030.

## 2023-12-20 LAB — H PYLORI AG STL QL IA: NOT DETECTED

## 2023-12-20 NOTE — PROGRESS NOTES
Patient reports he cannot get to lab sooner than 1/3 and therefore rescheduled his next INR from 12/26 to 1/3. Patient was re-educated on situations that would require placing a call to the coumadin clinic, including bleeding or unusual bruising issues, changes in health, diet or medications, upcoming procedures that require warfarin interruption, and missed coumadin dose(s). Patient expressed understanding that avoidance of consistency with these parameters could cause fluctuations in INR, leading to more frequent visits and increase risk of adverse events.

## 2024-01-09 NOTE — PROGRESS NOTES
1/09/24 Patient called to reschedule 1/03 missed lab appointment to 1/16---reports has had car trouble

## 2024-01-18 ENCOUNTER — HOSPITAL ENCOUNTER (INPATIENT)
Facility: HOSPITAL | Age: 49
LOS: 14 days | Discharge: HOME HEALTH | DRG: 286 | End: 2024-02-02
Attending: EMERGENCY MEDICINE | Admitting: STUDENT IN AN ORGANIZED HEALTH CARE EDUCATION/TRAINING PROGRAM
Payer: MEDICARE

## 2024-01-18 DIAGNOSIS — N18.30 STAGE 3 CHRONIC KIDNEY DISEASE, UNSPECIFIED WHETHER STAGE 3A OR 3B CKD: ICD-10-CM

## 2024-01-18 DIAGNOSIS — R07.9 CHEST PAIN: ICD-10-CM

## 2024-01-18 DIAGNOSIS — I27.20 PULMONARY HYPERTENSION: Primary | ICD-10-CM

## 2024-01-18 DIAGNOSIS — I50.30 (HFPEF) HEART FAILURE WITH PRESERVED EJECTION FRACTION: ICD-10-CM

## 2024-01-18 DIAGNOSIS — R06.02 SHORTNESS OF BREATH: ICD-10-CM

## 2024-01-18 DIAGNOSIS — Q21.12 PFO (PATENT FORAMEN OVALE): ICD-10-CM

## 2024-01-18 DIAGNOSIS — I50.812 CHRONIC RIGHT-SIDED HEART FAILURE: ICD-10-CM

## 2024-01-18 LAB
ALBUMIN SERPL BCP-MCNC: 2.8 G/DL (ref 3.5–5.2)
ALLENS TEST: ABNORMAL
ALLENS TEST: ABNORMAL
ALP SERPL-CCNC: 124 U/L (ref 55–135)
ALT SERPL W/O P-5'-P-CCNC: 18 U/L (ref 10–44)
ANION GAP SERPL CALC-SCNC: 8 MMOL/L (ref 8–16)
AST SERPL-CCNC: 23 U/L (ref 10–40)
BASOPHILS # BLD AUTO: 0.05 K/UL (ref 0–0.2)
BASOPHILS NFR BLD: 0.7 % (ref 0–1.9)
BILIRUB SERPL-MCNC: 1 MG/DL (ref 0.1–1)
BNP SERPL-MCNC: 1073 PG/ML (ref 0–99)
BUN SERPL-MCNC: 99 MG/DL (ref 6–20)
CALCIUM SERPL-MCNC: 9.3 MG/DL (ref 8.7–10.5)
CHLORIDE SERPL-SCNC: 88 MMOL/L (ref 95–110)
CO2 SERPL-SCNC: 39 MMOL/L (ref 23–29)
CREAT SERPL-MCNC: 2.7 MG/DL (ref 0.5–1.4)
DELSYS: ABNORMAL
DELSYS: ABNORMAL
DIFFERENTIAL METHOD BLD: ABNORMAL
EOSINOPHIL # BLD AUTO: 0 K/UL (ref 0–0.5)
EOSINOPHIL NFR BLD: 0 % (ref 0–8)
EP: 12
EP: 12
ERYTHROCYTE [DISTWIDTH] IN BLOOD BY AUTOMATED COUNT: 20.4 % (ref 11.5–14.5)
EST. GFR  (NO RACE VARIABLE): 28.2 ML/MIN/1.73 M^2
FIO2: 50
FIO2: 50
GLUCOSE SERPL-MCNC: 103 MG/DL (ref 70–110)
HCO3 UR-SCNC: 31.7 MMOL/L (ref 24–28)
HCO3 UR-SCNC: 44 MMOL/L (ref 24–28)
HCT VFR BLD AUTO: 54.8 % (ref 40–54)
HGB BLD-MCNC: 15.2 G/DL (ref 14–18)
IMM GRANULOCYTES # BLD AUTO: 0.01 K/UL (ref 0–0.04)
IMM GRANULOCYTES NFR BLD AUTO: 0.1 % (ref 0–0.5)
INFLUENZA A, MOLECULAR: NEGATIVE
INFLUENZA B, MOLECULAR: NEGATIVE
IP: 18
IP: 18
LYMPHOCYTES # BLD AUTO: 1.1 K/UL (ref 1–4.8)
LYMPHOCYTES NFR BLD: 15.1 % (ref 18–48)
MCH RBC QN AUTO: 24.6 PG (ref 27–31)
MCHC RBC AUTO-ENTMCNC: 27.7 G/DL (ref 32–36)
MCV RBC AUTO: 89 FL (ref 82–98)
MODE: ABNORMAL
MODE: ABNORMAL
MONOCYTES # BLD AUTO: 0.7 K/UL (ref 0.3–1)
MONOCYTES NFR BLD: 9.4 % (ref 4–15)
NEUTROPHILS # BLD AUTO: 5.2 K/UL (ref 1.8–7.7)
NEUTROPHILS NFR BLD: 74.7 % (ref 38–73)
NRBC BLD-RTO: 0 /100 WBC
PCO2 BLDA: 60.3 MMHG (ref 35–45)
PCO2 BLDA: 83.8 MMHG (ref 35–45)
PH SMN: 7.33 [PH] (ref 7.35–7.45)
PH SMN: 7.33 [PH] (ref 7.35–7.45)
PLATELET # BLD AUTO: 203 K/UL (ref 150–450)
PMV BLD AUTO: 9.8 FL (ref 9.2–12.9)
PO2 BLDA: 22 MMHG (ref 40–60)
PO2 BLDA: 25 MMHG (ref 40–60)
POC BE: 18 MMOL/L
POC BE: 6 MMOL/L
POC SATURATED O2: 33 % (ref 95–100)
POC SATURATED O2: 36 % (ref 95–100)
POC TCO2: 33 MMOL/L (ref 24–29)
POC TCO2: 46 MMOL/L (ref 24–29)
POTASSIUM SERPL-SCNC: 3.7 MMOL/L (ref 3.5–5.1)
PROT SERPL-MCNC: 7.5 G/DL (ref 6–8.4)
RBC # BLD AUTO: 6.17 M/UL (ref 4.6–6.2)
SAMPLE: ABNORMAL
SAMPLE: ABNORMAL
SARS-COV-2 RDRP RESP QL NAA+PROBE: NEGATIVE
SITE: ABNORMAL
SITE: ABNORMAL
SODIUM SERPL-SCNC: 135 MMOL/L (ref 136–145)
SPECIMEN SOURCE: NORMAL
TROPONIN I SERPL DL<=0.01 NG/ML-MCNC: 0.02 NG/ML (ref 0–0.03)
WBC # BLD AUTO: 6.94 K/UL (ref 3.9–12.7)

## 2024-01-18 PROCEDURE — 83880 ASSAY OF NATRIURETIC PEPTIDE: CPT | Mod: HCNC

## 2024-01-18 PROCEDURE — 94761 N-INVAS EAR/PLS OXIMETRY MLT: CPT | Mod: HCNC,XB

## 2024-01-18 PROCEDURE — 27100171 HC OXYGEN HIGH FLOW UP TO 24 HOURS: Mod: HCNC

## 2024-01-18 PROCEDURE — 80053 COMPREHEN METABOLIC PANEL: CPT | Mod: HCNC

## 2024-01-18 PROCEDURE — 63600175 PHARM REV CODE 636 W HCPCS: Mod: HCNC

## 2024-01-18 PROCEDURE — 93010 ELECTROCARDIOGRAM REPORT: CPT | Mod: HCNC,,, | Performed by: INTERNAL MEDICINE

## 2024-01-18 PROCEDURE — 96374 THER/PROPH/DIAG INJ IV PUSH: CPT | Mod: HCNC

## 2024-01-18 PROCEDURE — 85025 COMPLETE CBC W/AUTO DIFF WBC: CPT | Mod: HCNC

## 2024-01-18 PROCEDURE — 94660 CPAP INITIATION&MGMT: CPT | Mod: HCNC

## 2024-01-18 PROCEDURE — 99900035 HC TECH TIME PER 15 MIN (STAT): Mod: HCNC

## 2024-01-18 PROCEDURE — 82803 BLOOD GASES ANY COMBINATION: CPT | Mod: HCNC

## 2024-01-18 PROCEDURE — U0002 COVID-19 LAB TEST NON-CDC: HCPCS | Mod: HCNC

## 2024-01-18 PROCEDURE — 93005 ELECTROCARDIOGRAM TRACING: CPT | Mod: HCNC

## 2024-01-18 PROCEDURE — 99285 EMERGENCY DEPT VISIT HI MDM: CPT | Mod: 25,HCNC

## 2024-01-18 PROCEDURE — 27000190 HC CPAP FULL FACE MASK W/VALVE: Mod: HCNC

## 2024-01-18 PROCEDURE — 87502 INFLUENZA DNA AMP PROBE: CPT | Mod: HCNC

## 2024-01-18 PROCEDURE — 94799 UNLISTED PULMONARY SVC/PX: CPT | Mod: HCNC

## 2024-01-18 PROCEDURE — 84484 ASSAY OF TROPONIN QUANT: CPT | Mod: HCNC

## 2024-01-18 RX ORDER — FUROSEMIDE 10 MG/ML
40 INJECTION INTRAMUSCULAR; INTRAVENOUS
Status: COMPLETED | OUTPATIENT
Start: 2024-01-18 | End: 2024-01-18

## 2024-01-18 RX ORDER — FUROSEMIDE 10 MG/ML
80 INJECTION INTRAMUSCULAR; INTRAVENOUS
Status: COMPLETED | OUTPATIENT
Start: 2024-01-19 | End: 2024-01-19

## 2024-01-18 RX ADMIN — FUROSEMIDE 40 MG: 10 INJECTION, SOLUTION INTRAVENOUS at 10:01

## 2024-01-18 NOTE — Clinical Note
The PA catheter was repositioned to the pulmonary wedge. Hemodynamics were performed. O2 saturation was measured.

## 2024-01-18 NOTE — Clinical Note
The PA catheter was repositioned to the main pulmonary artery. Hemodynamics were performed. Thermodilutions were performed. CO= 6.14 CI= 2.92

## 2024-01-18 NOTE — Clinical Note
The PA catheter was repositioned to the main pulmonary artery. Hemodynamics were performed. O2 saturation was measured at 61%. CO= 3.74  CI= 1.78

## 2024-01-19 DIAGNOSIS — R06.02 SOB (SHORTNESS OF BREATH): Primary | ICD-10-CM

## 2024-01-19 LAB
ALBUMIN SERPL BCP-MCNC: 2.7 G/DL (ref 3.5–5.2)
ALLENS TEST: ABNORMAL
ALP SERPL-CCNC: 109 U/L (ref 55–135)
ALT SERPL W/O P-5'-P-CCNC: 16 U/L (ref 10–44)
ANION GAP SERPL CALC-SCNC: 7 MMOL/L (ref 8–16)
ANION GAP SERPL CALC-SCNC: 9 MMOL/L (ref 8–16)
ASCENDING AORTA: 3.07 CM
AST SERPL-CCNC: 21 U/L (ref 10–40)
AV INDEX (PROSTH): 0.92
AV MEAN GRADIENT: 2 MMHG
AV PEAK GRADIENT: 4 MMHG
AV VALVE AREA BY VELOCITY RATIO: 3.44 CM²
AV VALVE AREA: 4.66 CM²
AV VELOCITY RATIO: 0.68
BASOPHILS # BLD AUTO: 0.07 K/UL (ref 0–0.2)
BASOPHILS NFR BLD: 0.9 % (ref 0–1.9)
BILIRUB SERPL-MCNC: 1.1 MG/DL (ref 0.1–1)
BSA FOR ECHO PROCEDURE: 2.34 M2
BUN SERPL-MCNC: 92 MG/DL (ref 6–20)
BUN SERPL-MCNC: 96 MG/DL (ref 6–20)
CALCIUM SERPL-MCNC: 9.3 MG/DL (ref 8.7–10.5)
CALCIUM SERPL-MCNC: 9.5 MG/DL (ref 8.7–10.5)
CHLORIDE SERPL-SCNC: 88 MMOL/L (ref 95–110)
CHLORIDE SERPL-SCNC: 89 MMOL/L (ref 95–110)
CO2 SERPL-SCNC: 36 MMOL/L (ref 23–29)
CO2 SERPL-SCNC: 38 MMOL/L (ref 23–29)
CREAT SERPL-MCNC: 2.3 MG/DL (ref 0.5–1.4)
CREAT SERPL-MCNC: 2.5 MG/DL (ref 0.5–1.4)
CV ECHO LV RWT: 0.38 CM
DELSYS: ABNORMAL
DIFFERENTIAL METHOD BLD: ABNORMAL
DOP CALC AO PEAK VEL: 1 M/S
DOP CALC AO VTI: 14.12 CM
DOP CALC LVOT AREA: 5.1 CM2
DOP CALC LVOT DIAMETER: 2.54 CM
DOP CALC LVOT PEAK VEL: 0.68 M/S
DOP CALC LVOT STROKE VOLUME: 65.79 CM3
DOP CALCLVOT PEAK VEL VTI: 12.99 CM
E WAVE DECELERATION TIME: 169.46 MSEC
E/A RATIO: 0.77
E/E' RATIO: 13.5 M/S
ECHO LV POSTERIOR WALL: 0.84 CM (ref 0.6–1.1)
EJECTION FRACTION: 55 %
EOSINOPHIL # BLD AUTO: 0 K/UL (ref 0–0.5)
EOSINOPHIL NFR BLD: 0 % (ref 0–8)
ERYTHROCYTE [DISTWIDTH] IN BLOOD BY AUTOMATED COUNT: 20.6 % (ref 11.5–14.5)
EST. GFR  (NO RACE VARIABLE): 30.9 ML/MIN/1.73 M^2
EST. GFR  (NO RACE VARIABLE): 34.2 ML/MIN/1.73 M^2
ESTIMATED AVG GLUCOSE: 120 MG/DL (ref 68–131)
FLOW: 10
FRACTIONAL SHORTENING: 39 % (ref 28–44)
GLUCOSE SERPL-MCNC: 109 MG/DL (ref 70–110)
GLUCOSE SERPL-MCNC: 119 MG/DL (ref 70–110)
HBA1C MFR BLD: 5.8 % (ref 4–5.6)
HCO3 UR-SCNC: 33.4 MMOL/L (ref 24–28)
HCT VFR BLD AUTO: 51.9 % (ref 40–54)
HGB BLD-MCNC: 14.7 G/DL (ref 14–18)
IMM GRANULOCYTES # BLD AUTO: 0.02 K/UL (ref 0–0.04)
IMM GRANULOCYTES NFR BLD AUTO: 0.3 % (ref 0–0.5)
INTERVENTRICULAR SEPTUM: 0.86 CM (ref 0.6–1.1)
LA MAJOR: 5.23 CM
LA MINOR: 5.45 CM
LA WIDTH: 2.74 CM
LEFT ATRIUM SIZE: 2.92 CM
LEFT ATRIUM VOLUME INDEX MOD: 14.5 ML/M2
LEFT ATRIUM VOLUME INDEX: 16.4 ML/M2
LEFT ATRIUM VOLUME MOD: 32.13 CM3
LEFT ATRIUM VOLUME: 36.3 CM3
LEFT INTERNAL DIMENSION IN SYSTOLE: 2.67 CM (ref 2.1–4)
LEFT VENTRICLE DIASTOLIC VOLUME INDEX: 39.01 ML/M2
LEFT VENTRICLE DIASTOLIC VOLUME: 86.6 ML
LEFT VENTRICLE MASS INDEX: 53 G/M2
LEFT VENTRICLE SYSTOLIC VOLUME INDEX: 11.8 ML/M2
LEFT VENTRICLE SYSTOLIC VOLUME: 26.26 ML
LEFT VENTRICULAR INTERNAL DIMENSION IN DIASTOLE: 4.38 CM (ref 3.5–6)
LEFT VENTRICULAR MASS: 117.69 G
LV LATERAL E/E' RATIO: 18 M/S
LV SEPTAL E/E' RATIO: 10.8 M/S
LYMPHOCYTES # BLD AUTO: 1.5 K/UL (ref 1–4.8)
LYMPHOCYTES NFR BLD: 20.5 % (ref 18–48)
MAGNESIUM SERPL-MCNC: 1.5 MG/DL (ref 1.6–2.6)
MCH RBC QN AUTO: 24.4 PG (ref 27–31)
MCHC RBC AUTO-ENTMCNC: 28.3 G/DL (ref 32–36)
MCV RBC AUTO: 86 FL (ref 82–98)
MODE: ABNORMAL
MONOCYTES # BLD AUTO: 0.6 K/UL (ref 0.3–1)
MONOCYTES NFR BLD: 7.6 % (ref 4–15)
MV PEAK A VEL: 0.7 M/S
MV PEAK E VEL: 0.54 M/S
MV STENOSIS PRESSURE HALF TIME: 49.14 MS
MV VALVE AREA P 1/2 METHOD: 4.48 CM2
NEUTROPHILS # BLD AUTO: 5.2 K/UL (ref 1.8–7.7)
NEUTROPHILS NFR BLD: 70.7 % (ref 38–73)
NRBC BLD-RTO: 0 /100 WBC
PCO2 BLDA: 63.8 MMHG (ref 35–45)
PH SMN: 7.33 [PH] (ref 7.35–7.45)
PHOSPHATE SERPL-MCNC: 3.4 MG/DL (ref 2.7–4.5)
PISA TR MAX VEL: 3.85 M/S
PLATELET # BLD AUTO: 198 K/UL (ref 150–450)
PMV BLD AUTO: 9.9 FL (ref 9.2–12.9)
PO2 BLDA: 20 MMHG (ref 40–60)
POC BE: 7 MMOL/L
POC SATURATED O2: 27 % (ref 95–100)
POC TCO2: 35 MMOL/L (ref 24–29)
POTASSIUM SERPL-SCNC: 3 MMOL/L (ref 3.5–5.1)
POTASSIUM SERPL-SCNC: 3.7 MMOL/L (ref 3.5–5.1)
PROT SERPL-MCNC: 7.2 G/DL (ref 6–8.4)
RA MAJOR: 5.52 CM
RA PRESSURE ESTIMATED: 8 MMHG
RA WIDTH: 5.17 CM
RBC # BLD AUTO: 6.02 M/UL (ref 4.6–6.2)
RIGHT VENTRICULAR END-DIASTOLIC DIMENSION: 7.83 CM
RV TB RVSP: 12 MMHG
RV TISSUE DOPPLER FREE WALL SYSTOLIC VELOCITY 1 (APICAL 4 CHAMBER VIEW): 11.67 CM/S
SAMPLE: ABNORMAL
SINUS: 3 CM
SITE: ABNORMAL
SODIUM SERPL-SCNC: 133 MMOL/L (ref 136–145)
SODIUM SERPL-SCNC: 134 MMOL/L (ref 136–145)
STJ: 2.34 CM
TDI LATERAL: 0.03 M/S
TDI SEPTAL: 0.05 M/S
TDI: 0.04 M/S
TR MAX PG: 59 MMHG
TRICUSPID ANNULAR PLANE SYSTOLIC EXCURSION: 2.5 CM
TV REST PULMONARY ARTERY PRESSURE: 67 MMHG
WBC # BLD AUTO: 7.4 K/UL (ref 3.9–12.7)
Z-SCORE OF LEFT VENTRICULAR DIMENSION IN END DIASTOLE: -5.71
Z-SCORE OF LEFT VENTRICULAR DIMENSION IN END SYSTOLE: -4.48

## 2024-01-19 PROCEDURE — 25000242 PHARM REV CODE 250 ALT 637 W/ HCPCS: Mod: HCNC

## 2024-01-19 PROCEDURE — 96376 TX/PRO/DX INJ SAME DRUG ADON: CPT | Mod: HCNC

## 2024-01-19 PROCEDURE — 94761 N-INVAS EAR/PLS OXIMETRY MLT: CPT | Mod: HCNC,XB

## 2024-01-19 PROCEDURE — 84100 ASSAY OF PHOSPHORUS: CPT | Mod: HCNC

## 2024-01-19 PROCEDURE — 25000003 PHARM REV CODE 250: Mod: HCNC

## 2024-01-19 PROCEDURE — 63600175 PHARM REV CODE 636 W HCPCS: Mod: HCNC

## 2024-01-19 PROCEDURE — 27000221 HC OXYGEN, UP TO 24 HOURS: Mod: HCNC

## 2024-01-19 PROCEDURE — 80048 BASIC METABOLIC PNL TOTAL CA: CPT | Mod: HCNC,XB

## 2024-01-19 PROCEDURE — 83735 ASSAY OF MAGNESIUM: CPT | Mod: HCNC

## 2024-01-19 PROCEDURE — 99900035 HC TECH TIME PER 15 MIN (STAT): Mod: HCNC

## 2024-01-19 PROCEDURE — 36415 COLL VENOUS BLD VENIPUNCTURE: CPT | Mod: HCNC

## 2024-01-19 PROCEDURE — 82803 BLOOD GASES ANY COMBINATION: CPT | Mod: HCNC

## 2024-01-19 PROCEDURE — 80053 COMPREHEN METABOLIC PANEL: CPT | Mod: HCNC

## 2024-01-19 PROCEDURE — 96372 THER/PROPH/DIAG INJ SC/IM: CPT

## 2024-01-19 PROCEDURE — 83036 HEMOGLOBIN GLYCOSYLATED A1C: CPT | Mod: HCNC

## 2024-01-19 PROCEDURE — 85025 COMPLETE CBC W/AUTO DIFF WBC: CPT | Mod: HCNC

## 2024-01-19 PROCEDURE — 36415 COLL VENOUS BLD VENIPUNCTURE: CPT | Mod: HCNC,XB

## 2024-01-19 PROCEDURE — 27100171 HC OXYGEN HIGH FLOW UP TO 24 HOURS: Mod: HCNC

## 2024-01-19 PROCEDURE — 20600001 HC STEP DOWN PRIVATE ROOM: Mod: HCNC

## 2024-01-19 PROCEDURE — 94660 CPAP INITIATION&MGMT: CPT | Mod: HCNC

## 2024-01-19 PROCEDURE — 25500020 PHARM REV CODE 255: Mod: HCNC

## 2024-01-19 RX ORDER — FUROSEMIDE 10 MG/ML
60 INJECTION INTRAMUSCULAR; INTRAVENOUS 2 TIMES DAILY
Status: DISCONTINUED | OUTPATIENT
Start: 2024-01-19 | End: 2024-01-19

## 2024-01-19 RX ORDER — METOPROLOL TARTRATE 25 MG/1
25 TABLET, FILM COATED ORAL 2 TIMES DAILY
Status: DISCONTINUED | OUTPATIENT
Start: 2024-01-19 | End: 2024-01-31

## 2024-01-19 RX ORDER — MONTELUKAST SODIUM 10 MG/1
10 TABLET ORAL NIGHTLY
Status: DISCONTINUED | OUTPATIENT
Start: 2024-01-19 | End: 2024-02-02 | Stop reason: HOSPADM

## 2024-01-19 RX ORDER — HEPARIN SODIUM 5000 [USP'U]/ML
5000 INJECTION, SOLUTION INTRAVENOUS; SUBCUTANEOUS EVERY 8 HOURS
Status: DISCONTINUED | OUTPATIENT
Start: 2024-01-19 | End: 2024-01-20

## 2024-01-19 RX ORDER — FUROSEMIDE 10 MG/ML
120 INJECTION INTRAMUSCULAR; INTRAVENOUS
Status: DISCONTINUED | OUTPATIENT
Start: 2024-01-19 | End: 2024-01-19

## 2024-01-19 RX ORDER — ALBUTEROL SULFATE 90 UG/1
2 AEROSOL, METERED RESPIRATORY (INHALATION) EVERY 4 HOURS PRN
Status: DISCONTINUED | OUTPATIENT
Start: 2024-01-19 | End: 2024-02-02 | Stop reason: HOSPADM

## 2024-01-19 RX ORDER — FUROSEMIDE 10 MG/ML
120 INJECTION INTRAMUSCULAR; INTRAVENOUS
Status: DISCONTINUED | OUTPATIENT
Start: 2024-01-19 | End: 2024-01-20

## 2024-01-19 RX ORDER — ONDANSETRON 4 MG/1
4 TABLET, ORALLY DISINTEGRATING ORAL EVERY 8 HOURS PRN
Status: DISCONTINUED | OUTPATIENT
Start: 2024-01-19 | End: 2024-02-02 | Stop reason: HOSPADM

## 2024-01-19 RX ORDER — SODIUM CHLORIDE 0.9 % (FLUSH) 0.9 %
10 SYRINGE (ML) INJECTION
Status: DISCONTINUED | OUTPATIENT
Start: 2024-01-19 | End: 2024-02-02 | Stop reason: HOSPADM

## 2024-01-19 RX ORDER — FLUTICASONE FUROATE AND VILANTEROL 100; 25 UG/1; UG/1
1 POWDER RESPIRATORY (INHALATION) DAILY
Status: DISCONTINUED | OUTPATIENT
Start: 2024-01-19 | End: 2024-02-02 | Stop reason: HOSPADM

## 2024-01-19 RX ORDER — FUROSEMIDE 10 MG/ML
80 INJECTION INTRAMUSCULAR; INTRAVENOUS 2 TIMES DAILY
Status: DISCONTINUED | OUTPATIENT
Start: 2024-01-19 | End: 2024-01-19

## 2024-01-19 RX ORDER — MAGNESIUM SULFATE HEPTAHYDRATE 40 MG/ML
2 INJECTION, SOLUTION INTRAVENOUS ONCE
Status: COMPLETED | OUTPATIENT
Start: 2024-01-19 | End: 2024-01-19

## 2024-01-19 RX ORDER — PANTOPRAZOLE SODIUM 40 MG/1
40 TABLET, DELAYED RELEASE ORAL DAILY
Status: DISCONTINUED | OUTPATIENT
Start: 2024-01-19 | End: 2024-02-02 | Stop reason: HOSPADM

## 2024-01-19 RX ADMIN — FUROSEMIDE 80 MG: 10 INJECTION, SOLUTION INTRAVENOUS at 12:01

## 2024-01-19 RX ADMIN — METOPROLOL TARTRATE 25 MG: 25 TABLET, FILM COATED ORAL at 09:01

## 2024-01-19 RX ADMIN — HUMAN ALBUMIN MICROSPHERES AND PERFLUTREN 0.11 MG: 10; .22 INJECTION, SOLUTION INTRAVENOUS at 09:01

## 2024-01-19 RX ADMIN — MONTELUKAST 10 MG: 10 TABLET, FILM COATED ORAL at 05:01

## 2024-01-19 RX ADMIN — PANTOPRAZOLE SODIUM 40 MG: 40 TABLET, DELAYED RELEASE ORAL at 09:01

## 2024-01-19 RX ADMIN — HEPARIN SODIUM 5000 UNITS: 5000 INJECTION INTRAVENOUS; SUBCUTANEOUS at 02:01

## 2024-01-19 RX ADMIN — POTASSIUM BICARBONATE 30 MEQ: 391 TABLET, EFFERVESCENT ORAL at 06:01

## 2024-01-19 RX ADMIN — FUROSEMIDE 120 MG: 10 INJECTION, SOLUTION INTRAVENOUS at 09:01

## 2024-01-19 RX ADMIN — HEPARIN SODIUM 5000 UNITS: 5000 INJECTION INTRAVENOUS; SUBCUTANEOUS at 09:01

## 2024-01-19 RX ADMIN — HEPARIN SODIUM 5000 UNITS: 5000 INJECTION INTRAVENOUS; SUBCUTANEOUS at 05:01

## 2024-01-19 RX ADMIN — MONTELUKAST 10 MG: 10 TABLET, FILM COATED ORAL at 09:01

## 2024-01-19 RX ADMIN — MAGNESIUM SULFATE HEPTAHYDRATE 2 G: 40 INJECTION, SOLUTION INTRAVENOUS at 09:01

## 2024-01-19 RX ADMIN — POTASSIUM BICARBONATE 40 MEQ: 391 TABLET, EFFERVESCENT ORAL at 11:01

## 2024-01-19 RX ADMIN — FLUTICASONE FUROATE AND VILANTEROL TRIFENATATE 1 PUFF: 100; 25 POWDER RESPIRATORY (INHALATION) at 10:01

## 2024-01-19 RX ADMIN — FUROSEMIDE 120 MG: 10 INJECTION, SOLUTION INTRAVENOUS at 11:01

## 2024-01-19 RX ADMIN — POTASSIUM BICARBONATE 40 MEQ: 391 TABLET, EFFERVESCENT ORAL at 01:01

## 2024-01-19 NOTE — CONSULTS
Food & Nutrition  Education    Diet Education: Heart Failure  Time Spent: 5 minutes  Learners: Patient    Nutrition Education provided with handouts: Heart Failure Nutrition Therapy    Comments: Spoke to patient at bedside. Pt encouraged to eat most meals at home, utilizing salt free blends and fresh produce. Pt reminded to avoid pickled items and canned goods without low sodium or being rinsed. Pt encouraged to avoid fast food and restaurant meals when possible. Pt directed to read foods which foods to incorporate or avoid, as well as read sample menus. All questions and concerns answered. Dietitian's contact information provided.     Follow-Up: 1/26/2024    Please Re-consult as needed    Thanks!     I certify that I directed the dietetic intern in service delivery and guided them using my skilled judgment. As the cosigning dietitian, I have reviewed the dietetic interns documentation and am responsible for the treatment, assessment, and plan.     POLA Camara

## 2024-01-19 NOTE — HPI
Patient is a 47 yo male with a PMH of HFpEF, pickwickian syndrome, HTN, pHTN, Chronic hypoxic respiratory failure (baseline oxygen req at home is 3 L), PFO (unsuccessful closure in 2006), AF (on warfarin for unknown reason) who present with worsening leg swelling and shortness of breath for the past 1 week. He has associated symptoms of orthopnea. He was admitted last month with similar symptoms and was discharged on lasix and metolazone and told to weigh himself daily as he may need dosage adjustment of his diuretics. Patient states he has been compliant with his medications with good urine output but has not been weighing himself. He denies any chest pain, nausea, vomiting, lightheadedness, or dizziness. Interview difficult as patient currently on Bipap.    While in the ED: Patient with oxygen saturation at 79%. Labs reveal worsened BNP >1000. New AARON with Cr 2.7 (up from baseline of 1.5). Initial VBG with pH 7.32 and CO2 83. Initially put on BIPAP but weaned off to high flow nasal cannula. ABG with A total 120 mg of IV Lasix given.

## 2024-01-19 NOTE — ASSESSMENT & PLAN NOTE
Managed by pulmonology with [facility] with close follow ups  Sildenafil recently discontinued  Remains on 3L NC at baseline

## 2024-01-19 NOTE — ASSESSMENT & PLAN NOTE
Patient is identified as having Diastolic (HFpEF) heart failure that is Acute on chronic. CHF is currently uncontrolled due to Continued edema of extremities, >3 pillow orthopnea, and Rales/crackles on pulmonary exam. CXR with underlying edema.    Echo    Interpretation Summary  · The left ventricle is normal in size with mildly decreased systolic function.  · The estimated ejection fraction is 50%.  · There are segmental left ventricular wall motion abnormalities.  · There is abnormal septal wall motion.  · Left ventricular diastolic dysfunction.  · Moderate right ventricular enlargement with mildly reduced right ventricular systolic function.  · Mild right atrial enlargement.  · Normal central venous pressure (3 mmHg).    Recent Labs   Lab 01/18/24  2216   BNP 1,073*       -Patient given 120mg IV lasix in ED  -Continue 80mg IV lasix BID, titrate to goal urine output of 2L/day   -Consider restarting metolazone if urine output still not to goal  -Continue Beta Blocker, rest of GDMT is precluded by AARON  -Can consider Bidil     -Monitor strict Is&Os and daily weights.    -Place on fluid restriction of 1.5 L.   -Low salt diet   -Cardiology has not been consulted, consider consulting if heart failure remains uncontrolled

## 2024-01-19 NOTE — PROGRESS NOTES
01/19/24 1117   Vital Signs   BP (!) 103/58   MAP (mmHg) 75   BP Location Right arm   Patient Position Sitting     Ordered to give lasix 120mg by MD Jonathon.

## 2024-01-19 NOTE — SUBJECTIVE & OBJECTIVE
"Past Medical History:   Diagnosis Date    Arthritis     CHF (congestive heart failure)     Diastolic dysfunction    Cor pulmonale 11/05/2012    Gallstones     Hypertension     Morbid obesity 11/05/2012    Obesity hypoventilation syndrome     On home oxygen therapy 03/07/2014    MALLIKA (obstructive sleep apnea)     BPAP 16/11 with 3 L at night (continuous O2).    Paroxysmal atrial fibrillation     PFO (patent foramen ovale) 11/05/2012    status post closure    Pickwickian syndrome 11/05/2012    Pulmonary hypertension        Past Surgical History:   Procedure Laterality Date    RIGHT HEART CATHETERIZATION Right 3/22/2022    Procedure: INSERTION, CATHETER, RIGHT HEART;  Surgeon: Tony Martínez MD;  Location: [facility] CATH LAB;  Service: Cardiology;  Laterality: Right;       Review of patient's allergies indicates:   Allergen Reactions    Lipitor [atorvastatin] Other (See Comments)     "it makes my legs feel like jelly"  Other reaction(s): causes legs to hurt       No current facility-administered medications on file prior to encounter.     Current Outpatient Medications on File Prior to Encounter   Medication Sig    acetaminophen (TYLENOL ARTHRITIS ORAL) Take 2 tablets by mouth daily as needed (pain).    albuterol (VENTOLIN HFA) 90 mcg/actuation inhaler Inhale 2 puffs into the lungs every 4 (four) hours as needed for Wheezing. Rescue    allopurinoL (ZYLOPRIM) 300 MG tablet Take 1 tablet (300 mg total) by mouth once daily.    ascorbic acid (VITAMIN C ORAL) Take 1 tablet by mouth once daily.    b complex vitamins tablet Take 1 tablet by mouth once daily.    CALCIUM ORAL Take 1 capsule by mouth once daily.    furosemide (LASIX) 40 MG tablet Take 80 mg by mouth 2 (two) times daily.    metOLazone (ZAROXOLYN) 2.5 MG tablet TAKE 1 TABLET(2.5 MG) BY MOUTH DAILY UNTIL YOU ARE SEEN BY PCP OR CARDIOLOGY.    metoprolol tartrate (LOPRESSOR) 25 MG tablet TAKE 1 TABLET BY MOUTH TWICE DAILY    montelukast (SINGULAIR) 10 mg tablet Take 10 mg " by mouth every evening.    multivit,calc,min/FA/K1/lycop (ONE-A-DAY MEN'S COMPLETE ORAL) Take 1 tablet by mouth once daily.    omega-3 fatty acids/fish oil (FISH OIL-OMEGA-3 FATTY ACIDS) 300-1,000 mg capsule Take 1 capsule by mouth once daily.    pantoprazole (PROTONIX) 40 MG tablet Take 1 tablet (40 mg total) by mouth once daily.    tiotropium (SPIRIVA) 18 mcg inhalation capsule Inhale 18 mcg into the lungs once daily.    warfarin (COUMADIN) 10 MG tablet Take 1/2 tablet (5 mg) by mouth daily or as directed by [facility]    WIXELA INHUB 250-50 mcg/dose diskus inhaler 1 puff 2 (two) times daily. USE 1 INHALATION BY MOUTH TWICE DAILY    [DISCONTINUED] sildenafil (REVATIO) 20 mg Tab Take 1 tablet (20 mg total) by mouth 3 (three) times daily.     Family History       Problem Relation (Age of Onset)    Arthritis Mother, Maternal Grandmother, Maternal Grandfather    Asthma Mother, Sister, Maternal Grandmother    Breast cancer Paternal Grandmother    Diabetes Maternal Grandmother    Down syndrome Brother    Gout Brother    Hypertension Father, Maternal Grandmother, Maternal Grandfather, Paternal Grandfather          Tobacco Use    Smoking status: Never    Smokeless tobacco: Never   Substance and Sexual Activity    Alcohol use: Yes     Comment: holidays  rare    Drug use: No    Sexual activity: Not Currently     Partners: Female     Review of Systems   Constitutional:  Negative for appetite change, chills, diaphoresis, fatigue and fever.   Eyes:  Negative for visual disturbance.   Respiratory:  Positive for shortness of breath. Negative for cough, chest tightness and wheezing.    Cardiovascular:  Positive for leg swelling. Negative for chest pain and palpitations.   Gastrointestinal:  Positive for abdominal pain (bloating). Negative for constipation, diarrhea, nausea and vomiting.   Genitourinary:  Negative for dysuria, frequency and urgency.   Neurological:  Negative for dizziness, facial asymmetry and  light-headedness.     Objective:     Vital Signs (Most Recent):  Temp: 97.8 °F (36.6 °C) (24 2156)  Pulse: 93 (24 0200)  Resp: 18 (24 0200)  BP: 124/78 (24 0200)  SpO2: (!) 91 % (24 020) Vital Signs (24h Range):  Temp:  [97.8 °F (36.6 °C)] 97.8 °F (36.6 °C)  Pulse:  [88-96] 93  Resp:  [16-29] 18  SpO2:  [79 %-98 %] 91 %  BP: (104-124)/(55-78) 124/78     Weight: 117.5 kg (259 lb)  Body mass index is 44.44 kg/m².     Physical Exam  Constitutional:       General: He is not in acute distress.     Appearance: Normal appearance. He is not ill-appearing.   HENT:      Head: Normocephalic and atraumatic.      Nose: Nose normal.      Mouth/Throat:      Mouth: Mucous membranes are moist.   Eyes:      Extraocular Movements: Extraocular movements intact.      Conjunctiva/sclera: Conjunctivae normal.      Pupils: Pupils are equal, round, and reactive to light.   Neck:      Comments: JVD difficult to appreciate with CPAP in place  Cardiovascular:      Rate and Rhythm: Normal rate and regular rhythm.      Pulses: Normal pulses.      Heart sounds: Normal heart sounds. No murmur heard.  Pulmonary:      Effort: Pulmonary effort is normal. No respiratory distress.      Breath sounds: Rales present. No wheezing or rhonchi.   Abdominal:      General: Abdomen is flat. Bowel sounds are normal.      Palpations: Abdomen is soft.   Musculoskeletal:         General: Swelling present.      Right lower le+ Pitting Edema present.      Left lower le+ Pitting Edema present.   Skin:     General: Skin is warm and dry.      Capillary Refill: Capillary refill takes less than 2 seconds.   Neurological:      Mental Status: He is alert and oriented to person, place, and time.   Psychiatric:         Mood and Affect: Mood normal.         Behavior: Behavior normal.         Thought Content: Thought content normal.         Judgment: Judgment normal.              CRANIAL NERVES     CN III, IV, VI   Pupils are equal, round,  and reactive to light.       Significant Labs: All pertinent labs within the past 24 hours have been reviewed.  CBC:   Recent Labs   Lab 01/18/24  2216   WBC 6.94   HGB 15.2   HCT 54.8*        CMP:   Recent Labs   Lab 01/18/24  2216   *   K 3.7   CL 88*   CO2 39*      BUN 99*   CREATININE 2.7*   CALCIUM 9.3   PROT 7.5   ALBUMIN 2.8*   BILITOT 1.0   ALKPHOS 124   AST 23   ALT 18   ANIONGAP 8       Significant Imaging: I have reviewed all pertinent imaging results/findings within the past 24 hours.

## 2024-01-19 NOTE — ED PROVIDER NOTES
"Encounter Date: 1/18/2024       History     Chief Complaint   Patient presents with    Shortness of Breath     Hx pulmonary hx/ CHF.      49 y/o M with a PMHx of Pickwickian syndrome, pulmonary HTN, MALLIKA, HTN, CKD presents to the ED c/o a 1 week history of SOB and bilateral leg swelling which acutely worsened today. He states he has been compliant with his lasix. He also notes of associated abdominal pain which he states feels like his GERD which he has been experiencing for 1 month. He denies any active CP, N, V, diarrhea. No other complaints at this time.       The history is provided by the patient and medical records.     Review of patient's allergies indicates:   Allergen Reactions    Lipitor [atorvastatin] Other (See Comments)     "it makes my legs feel like jelly"  Other reaction(s): causes legs to hurt     Past Medical History:   Diagnosis Date    Arthritis     CHF (congestive heart failure)     Diastolic dysfunction    Cor pulmonale 11/05/2012    Gallstones     Hypertension     Morbid obesity 11/05/2012    Obesity hypoventilation syndrome     On home oxygen therapy 03/07/2014    MALLIKA (obstructive sleep apnea)     BPAP 16/11 with 3 L at night (continuous O2).    Paroxysmal atrial fibrillation     PFO (patent foramen ovale) 11/05/2012    status post closure    Pickwickian syndrome 11/05/2012    Pulmonary hypertension      Past Surgical History:   Procedure Laterality Date    RIGHT HEART CATHETERIZATION Right 3/22/2022    Procedure: INSERTION, CATHETER, RIGHT HEART;  Surgeon: Tony Martínez MD;  Location: [facility] CATH LAB;  Service: Cardiology;  Laterality: Right;     Family History   Problem Relation Age of Onset    Arthritis Mother     Asthma Mother     Hypertension Father     Asthma Sister     Arthritis Maternal Grandmother     Asthma Maternal Grandmother     Diabetes Maternal Grandmother     Hypertension Maternal Grandmother     Arthritis Maternal Grandfather     Hypertension Maternal Grandfather     " Hypertension Paternal Grandfather     Breast cancer Paternal Grandmother     Down syndrome Brother     Gout Brother      Social History     Tobacco Use    Smoking status: Never    Smokeless tobacco: Never   Substance Use Topics    Alcohol use: Yes     Comment: holidays  rare    Drug use: No     Review of Systems    Physical Exam     Initial Vitals   BP Pulse Resp Temp SpO2   01/18/24 2132 01/18/24 2132 01/18/24 2132 01/18/24 2156 01/18/24 2132   (!) 105/55 96 16 97.8 °F (36.6 °C) (!) 79 %      MAP       --                Physical Exam    Nursing note and vitals reviewed.  Constitutional: Vital signs are normal. He appears well-developed and well-nourished. He is not diaphoretic. No distress.   HENT:   Head: Normocephalic and atraumatic.   Right Ear: External ear normal.   Left Ear: External ear normal.   Eyes: EOM are normal. Right eye exhibits no discharge. Left eye exhibits no discharge.   Neck: Trachea normal. Neck supple. No thyroid mass present.   Normal range of motion.  Cardiovascular:  Normal rate, regular rhythm, normal heart sounds and intact distal pulses.     Exam reveals no gallop and no friction rub.       No murmur heard.  Pulmonary/Chest: He is in respiratory distress. He has no wheezes. He has no rhonchi. He has rales (bibasilar).   Abdominal: Abdomen is soft. Bowel sounds are normal. He exhibits no distension. There is no abdominal tenderness. There is no rebound and no guarding.   Musculoskeletal:         General: Edema (BLE, symmetrical, extends to the proximal thighs) present.      Cervical back: Normal range of motion and neck supple.     Neurological: He is alert and oriented to person, place, and time. He has normal strength. No cranial nerve deficit or sensory deficit. GCS score is 15. GCS eye subscore is 4. GCS verbal subscore is 5. GCS motor subscore is 6.   Skin: Skin is warm and dry. Capillary refill takes less than 2 seconds. No rash noted.   Psychiatric: He has a normal mood and  affect.         ED Course   Procedures  Labs Reviewed   CBC W/ AUTO DIFFERENTIAL - Abnormal; Notable for the following components:       Result Value    Hematocrit 54.8 (*)     MCH 24.6 (*)     MCHC 27.7 (*)     RDW 20.4 (*)     Gran % 74.7 (*)     Lymph % 15.1 (*)     All other components within normal limits   COMPREHENSIVE METABOLIC PANEL - Abnormal; Notable for the following components:    Sodium 135 (*)     Chloride 88 (*)     CO2 39 (*)     BUN 99 (*)     Creatinine 2.7 (*)     Albumin 2.8 (*)     eGFR 28.2 (*)     All other components within normal limits   B-TYPE NATRIURETIC PEPTIDE - Abnormal; Notable for the following components:    BNP 1,073 (*)     All other components within normal limits   ISTAT PROCEDURE - Abnormal; Notable for the following components:    POC PH 7.328 (*)     POC PCO2 83.8 (*)     POC PO2 25 (*)     POC HCO3 44.0 (*)     POC BE 18 (*)     POC TCO2 46 (*)     All other components within normal limits   ISTAT PROCEDURE - Abnormal; Notable for the following components:    POC PH 7.328 (*)     POC PCO2 60.3 (*)     POC PO2 22 (*)     POC HCO3 31.7 (*)     POC BE 6 (*)     POC TCO2 33 (*)     All other components within normal limits   ISTAT PROCEDURE - Abnormal; Notable for the following components:    POC PH 7.327 (*)     POC PCO2 63.8 (*)     POC PO2 20 (*)     POC HCO3 33.4 (*)     POC BE 7 (*)     POC TCO2 35 (*)     All other components within normal limits   INFLUENZA A & B BY MOLECULAR   SARS-COV-2 RNA AMPLIFICATION, QUAL   TROPONIN I     EKG Readings: (Independently Interpreted)   88 bpm. NSR. R axis deviation noted on prior EKG. No STEMI.       Imaging Results              X-Ray Chest 1 View (Final result)  Result time 01/18/24 23:17:02      Final result by Real Louis MD (01/18/24 23:17:02)                   Impression:      Stable examination.      Electronically signed by: Real Louis MD  Date:    01/18/2024  Time:    23:17               Narrative:    EXAMINATION:  XR  CHEST 1 VIEW    CLINICAL HISTORY:  shortness of breath;    TECHNIQUE:  Single frontal view of the chest was performed.    COMPARISON:  12/11/2023.    FINDINGS:  Monitoring EKG leads are present.  The trachea is unremarkable.  The cardiomediastinal silhouette is stable.  There is no evidence of free air beneath hemidiaphragms.  There are no pleural effusions.  There is no evidence of a pneumothorax.  There is no evidence of pneumomediastinum.  There is unchanged appearance of bilateral ground-glass airspace opacities.  There is underlying pulmonary edema.  No new airspace disease.  There are degenerative changes in the osseous structures.                                       Medications   furosemide injection 40 mg (40 mg Intravenous Given 1/18/24 2224)   furosemide injection 80 mg (80 mg Intravenous Given 1/19/24 0008)     Medical Decision Making  47 y/o M who appears to be in mild distress presents to the ED c/o SOB and leg swelling. ABCs intact. Initial triage vitals reveal mild hypotension and hypoxia.    Ddx includes but is not limited to CHF exacerbation, COPD exacerbation, ACS, cardiac arrhythmia, unlikely PE, electrolyte abnormality, anemia    Amount and/or Complexity of Data Reviewed  Labs: ordered. Decision-making details documented in ED Course.  Radiology: ordered. Decision-making details documented in ED Course.    Risk  Prescription drug management.      Additional MDM:     Well's Criteria Score:  -Clinical symptoms of DVT (leg swelling, pain with palpation) = 0.0  -PE is #1 diagnosis OR equally likely =            0.0  -Heart Rate >100 =   0.0  -Immobilization (= or > than 3 days) or surgery in the previous 4 weeks = 0.0  -Previous DVT/PE = 0.0  -Hemoptysis =          0.0  -Malignancy =           0.0  Well's Probability Score =    0              ED Course as of 01/19/24 0147   Thu Jan 18, 2024 2205 On initial evaluation patient appears comfortable on BiPAP (18/12). Will obtain initial VBG and wean  BiPAP as tolerated. [BG]   2230 ISTAT PROCEDURE(!!)  Mildly acidotic with elevated PCO2. Bicarbonate is elevated likely 2/2 chronic retention. Prior VBGs reveal PCO2s ranging from 50-80. Will repeat VBG in 1 hour. [BG]   2239 CBC Auto Differential(!)  No leukocytosis. No anemia.  [BG]   2308 SARS-CoV-2 RNA, Amplification, Qual: Negative [BG]   2328 Influenza A, Molecular: Negative [BG]   2328 Influenza B, Molecular: Negative [BG]   2328 BNP(!): 1,073  Consistent with physical exam findings of fluid overload. [BG]   2328 X-Ray Chest 1 View  Per my independent interpretation chest x-ray reveals pulmonary edema. [BG]   2345 Troponin I: 0.018  Of ACS in the setting of an unremarkable EKG and no active chest pain. [BG]   2345 Creatinine(!): 2.7  New AARON. [BG]   2346 Bedside RN notified me that the patient has only urinated 500 cc since Lasix administration.  I will give the patient an additional 80 mg of IV Lasix. [BG]   Fri Jan 19, 2024   0001 POC PCO2(!): 60.3  Improved pCO2.  Discussed with RT to wean off BiPAP.  Will determine disposition based on oxygen support. [BG]   0113 ISTAT PROCEDURE(!!)  Performed 1 hour after BiPAP was removed.  Appears relatively unchanged. [BG]   0146 Patient will be admitted to Hospital Medicine under observation designation for further workup and evaluation of CHF exacerbation. [BG]      ED Course User Index  [BG] Curt Pearl MD                           Clinical Impression:  Final diagnoses:  [R06.02] Shortness of breath          ED Disposition Condition    Observation                 Curt Pearl MD  Resident  01/19/24 0147

## 2024-01-19 NOTE — PLAN OF CARE
Patient in bed with call light in reach and on high flow humidified oxygen at 10L NC. Patient is ambulatory with no complaints of pain.

## 2024-01-19 NOTE — H&P
[facility] Emergency Dept  Hospital Medicine  History & Physical    Patient Name: Yong Mcintyre  MRN: [id number]  Patient Class: OP- Observation  Admission Date: 1/18/2024  Attending Physician: Suresh Holt MD   Primary Care Provider: Gianni Escalona MD         Patient information was obtained from patient and ER records.     Subjective:     Principal Problem:Acute on chronic heart failure with preserved ejection fraction (HFpEF)    Chief Complaint:   Chief Complaint   Patient presents with    Shortness of Breath     Hx pulmonary hx/ CHF.         HPI: Patient is a 49 yo male with a PMH of HFpEF, pickwickian syndrome, HTN, pHTN, Chronic hypoxic respiratory failure (baseline oxygen req at home is 3 L), PFO (unsuccessful closure in 2006), AF (on warfarin for unknown reason) who present with worsening leg swelling and shortness of breath for the past 1 week. He has associated symptoms of orthopnea. He was admitted last month with similar symptoms and was discharged on lasix and metolazone and told to weigh himself daily as he may need dosage adjustment of his diuretics. Patient states he has been compliant with his medications with good urine output but has not been weighing himself. He denies any chest pain, nausea, vomiting, lightheadedness, or dizziness. Interview difficult as patient currently on Bipap.    While in the ED: Patient with oxygen saturation at 79%. Labs reveal worsened BNP >1000. New AARON with Cr 2.7 (up from baseline of 1.5). Initial VBG with pH 7.32 and CO2 83. Initially put on BIPAP but weaned off to high flow nasal cannula. ABG with A total 120 mg of IV Lasix given.      Past Medical History:   Diagnosis Date    Arthritis     CHF (congestive heart failure)     Diastolic dysfunction    Cor pulmonale 11/05/2012    Gallstones     Hypertension     Morbid obesity 11/05/2012    Obesity hypoventilation syndrome     On home oxygen therapy 03/07/2014    MALLIKA (obstructive sleep apnea)     BPAP 16/11 with  "3 L at night (continuous O2).    Paroxysmal atrial fibrillation     PFO (patent foramen ovale) 11/05/2012    status post closure    Pickwickian syndrome 11/05/2012    Pulmonary hypertension        Past Surgical History:   Procedure Laterality Date    RIGHT HEART CATHETERIZATION Right 3/22/2022    Procedure: INSERTION, CATHETER, RIGHT HEART;  Surgeon: Tony Martínez MD;  Location: [facility] CATH LAB;  Service: Cardiology;  Laterality: Right;       Review of patient's allergies indicates:   Allergen Reactions    Lipitor [atorvastatin] Other (See Comments)     "it makes my legs feel like jelly"  Other reaction(s): causes legs to hurt       No current facility-administered medications on file prior to encounter.     Current Outpatient Medications on File Prior to Encounter   Medication Sig    acetaminophen (TYLENOL ARTHRITIS ORAL) Take 2 tablets by mouth daily as needed (pain).    albuterol (VENTOLIN HFA) 90 mcg/actuation inhaler Inhale 2 puffs into the lungs every 4 (four) hours as needed for Wheezing. Rescue    allopurinoL (ZYLOPRIM) 300 MG tablet Take 1 tablet (300 mg total) by mouth once daily.    ascorbic acid (VITAMIN C ORAL) Take 1 tablet by mouth once daily.    b complex vitamins tablet Take 1 tablet by mouth once daily.    CALCIUM ORAL Take 1 capsule by mouth once daily.    furosemide (LASIX) 40 MG tablet Take 80 mg by mouth 2 (two) times daily.    metOLazone (ZAROXOLYN) 2.5 MG tablet TAKE 1 TABLET(2.5 MG) BY MOUTH DAILY UNTIL YOU ARE SEEN BY PCP OR CARDIOLOGY.    metoprolol tartrate (LOPRESSOR) 25 MG tablet TAKE 1 TABLET BY MOUTH TWICE DAILY    montelukast (SINGULAIR) 10 mg tablet Take 10 mg by mouth every evening.    multivit,calc,min/FA/K1/lycop (ONE-A-DAY MEN'S COMPLETE ORAL) Take 1 tablet by mouth once daily.    omega-3 fatty acids/fish oil (FISH OIL-OMEGA-3 FATTY ACIDS) 300-1,000 mg capsule Take 1 capsule by mouth once daily.    pantoprazole (PROTONIX) 40 MG tablet Take 1 tablet (40 mg total) by mouth once " daily.    tiotropium (SPIRIVA) 18 mcg inhalation capsule Inhale 18 mcg into the lungs once daily.    warfarin (COUMADIN) 10 MG tablet Take 1/2 tablet (5 mg) by mouth daily or as directed by Coumadin Clinic    WIXELA INHUB 250-50 mcg/dose diskus inhaler 1 puff 2 (two) times daily. USE 1 INHALATION BY MOUTH TWICE DAILY    [DISCONTINUED] sildenafil (REVATIO) 20 mg Tab Take 1 tablet (20 mg total) by mouth 3 (three) times daily.     Family History       Problem Relation (Age of Onset)    Arthritis Mother, Maternal Grandmother, Maternal Grandfather    Asthma Mother, Sister, Maternal Grandmother    Breast cancer Paternal Grandmother    Diabetes Maternal Grandmother    Down syndrome Brother    Gout Brother    Hypertension Father, Maternal Grandmother, Maternal Grandfather, Paternal Grandfather          Tobacco Use    Smoking status: Never    Smokeless tobacco: Never   Substance and Sexual Activity    Alcohol use: Yes     Comment: holidays  rare    Drug use: No    Sexual activity: Not Currently     Partners: Female     Review of Systems   Constitutional:  Negative for appetite change, chills, diaphoresis, fatigue and fever.   Eyes:  Negative for visual disturbance.   Respiratory:  Positive for shortness of breath. Negative for cough, chest tightness and wheezing.    Cardiovascular:  Positive for leg swelling. Negative for chest pain and palpitations.   Gastrointestinal:  Positive for abdominal pain (bloating). Negative for constipation, diarrhea, nausea and vomiting.   Genitourinary:  Negative for dysuria, frequency and urgency.   Neurological:  Negative for dizziness, facial asymmetry and light-headedness.     Objective:     Vital Signs (Most Recent):  Temp: 97.8 °F (36.6 °C) (01/18/24 2156)  Pulse: 93 (01/19/24 0200)  Resp: 18 (01/19/24 0200)  BP: 124/78 (01/19/24 0200)  SpO2: (!) 91 % (01/19/24 0200) Vital Signs (24h Range):  Temp:  [97.8 °F (36.6 °C)] 97.8 °F (36.6 °C)  Pulse:  [88-96] 93  Resp:  [16-29] 18  SpO2:  [79  %-98 %] 91 %  BP: (104-124)/(55-78) 124/78     Weight: 117.5 kg (259 lb)  Body mass index is 44.44 kg/m².     Physical Exam  Constitutional:       General: He is not in acute distress.     Appearance: Normal appearance. He is not ill-appearing.   HENT:      Head: Normocephalic and atraumatic.      Nose: Nose normal.      Mouth/Throat:      Mouth: Mucous membranes are moist.   Eyes:      Extraocular Movements: Extraocular movements intact.      Conjunctiva/sclera: Conjunctivae normal.      Pupils: Pupils are equal, round, and reactive to light.   Neck:      Comments: JVD difficult to appreciate with CPAP in place  Cardiovascular:      Rate and Rhythm: Normal rate and regular rhythm.      Pulses: Normal pulses.      Heart sounds: Normal heart sounds. No murmur heard.  Pulmonary:      Effort: Pulmonary effort is normal. No respiratory distress.      Breath sounds: Rales present. No wheezing or rhonchi.   Abdominal:      General: Abdomen is flat. Bowel sounds are normal.      Palpations: Abdomen is soft.   Musculoskeletal:         General: Swelling present.      Right lower le+ Pitting Edema present.      Left lower le+ Pitting Edema present.   Skin:     General: Skin is warm and dry.      Capillary Refill: Capillary refill takes less than 2 seconds.   Neurological:      Mental Status: He is alert and oriented to person, place, and time.   Psychiatric:         Mood and Affect: Mood normal.         Behavior: Behavior normal.         Thought Content: Thought content normal.         Judgment: Judgment normal.              CRANIAL NERVES     CN III, IV, VI   Pupils are equal, round, and reactive to light.       Significant Labs: All pertinent labs within the past 24 hours have been reviewed.  CBC:   Recent Labs   Lab 24  2216   WBC 6.94   HGB 15.2   HCT 54.8*        CMP:   Recent Labs   Lab 24  2216   *   K 3.7   CL 88*   CO2 39*      BUN 99*   CREATININE 2.7*   CALCIUM 9.3   PROT 7.5    ALBUMIN 2.8*   BILITOT 1.0   ALKPHOS 124   AST 23   ALT 18   ANIONGAP 8       Significant Imaging: I have reviewed all pertinent imaging results/findings within the past 24 hours.  Assessment/Plan:     * Acute on chronic heart failure with preserved ejection fraction (HFpEF)  Patient is identified as having Diastolic (HFpEF) heart failure that is Acute on chronic. CHF is currently uncontrolled due to Continued edema of extremities, >3 pillow orthopnea, and Rales/crackles on pulmonary exam. CXR with underlying edema.    Echo    Interpretation Summary  · The left ventricle is normal in size with mildly decreased systolic function.  · The estimated ejection fraction is 50%.  · There are segmental left ventricular wall motion abnormalities.  · There is abnormal septal wall motion.  · Left ventricular diastolic dysfunction.  · Moderate right ventricular enlargement with mildly reduced right ventricular systolic function.  · Mild right atrial enlargement.  · Normal central venous pressure (3 mmHg).    Recent Labs   Lab 01/18/24  2216   BNP 1,073*       -Patient given 120mg IV lasix in ED  -Continue 80mg IV lasix BID, titrate to goal urine output of 2L/day   -Consider restarting metolazone if urine output still not to goal  -Continue Beta Blocker, rest of GDMT is precluded by AARON  -Can consider Bidil     -Monitor strict Is&Os and daily weights.    -Place on fluid restriction of 1.5 L.   -Low salt diet   -Cardiology has not been consulted, consider consulting if heart failure remains uncontrolled      Acute on chronic respiratory failure with hypoxia and hypercapnia  Patient with Hypercapnic and Hypoxic Respiratory failure which is Acute on chronic.  he is on home oxygen at 3 LPM. CXR with underlying edema.    Likely related to CHF exacerbation. Patient is a chronic CO2 retained with Pickwickian syndrome.    -Continuous oxygen and pulse ox  -BiPAP QHS      Pickwickian syndrome  Body mass index is 44.44 kg/m². Morbid  obesity complicates all aspects of disease management from diagnostic modalities to treatment. Weight loss encouraged and health benefits explained to patient.     - BiPAP QHS as tolerated       Epigastric abdominal pain  Described as bloating.  -Has follow up with GI on February 2nd      Chronic asthmatic bronchitis  - Continue home inhaler alternatives       MALLIKA (obstructive sleep apnea)  -BiPAP QHS      PFO (patent foramen ovale)  Thought to be cause of patient's structural heart disease.      Pulmonary hypertension  Managed by pulmonology with [facility] with close follow ups  Sildenafil recently discontinued  Remains on 3L NC at baseline        VTE Risk Mitigation (From admission, onward)           Ordered     heparin (porcine) injection 5,000 Units  Every 8 hours         01/19/24 0208     IP VTE HIGH RISK PATIENT  Once         01/19/24 0208     Place sequential compression device  Until discontinued         01/19/24 0208                           On 01/19/2024, patient should be placed in hospital observation services under my care in collaboration with Suresh Holt MD.         Joseph Gonzalez DO  Department of Hospital Medicine  [facility] Dept

## 2024-01-19 NOTE — PROGRESS NOTES
"[facility]) nurse navigator notified of HFTCC candidate in need of education and introduction to 4-6 week program.      PT aao x 3 while lying in bed. Introduced self to pt as HFTCC nurse navigator.     Patient given "[facility] Home Care Guide" which includes "Daily weight and symptom tracker".  Encouraged pt to review information.      Reviewed the following key points of HFTCC program with pt :              1.) Take your medications as directed. Call Ms Flaquita if any health Care Professional changes your Heart/Fluid medications.               2.) Weight yourself daily. Daily Dry Weights. Upon waking ,empty your bladder, weigh with as little clothes as possible before eating/drinking. Record weight in Symptom Tracker and compare these weights for fluid gain. Weight gain overnight of 3-4 lbs is Fluid, also a gain of 5 lbs in 3 days is Fluid as well. Call US!              3.) Follow low salt and limited fluid diet. Salt/Sodium Restriction 5145-3578 mg, see page 4. Sodium makes you hold onto Fluid. High Sodium Foods;Deli Meat/Cheese, Sausages/Hot Dogs, Fast Food/Restaurant Food, Anything in a box, bottle or can. Cook with Fresh or Frozen ingredients and use seasonings that are labeled NO SALT. Check Portion Sizes, Salt is reported for 1 portion. A can may contain 2-3 portions. Fluid Restriction 1.5 -2 Liters/Day, see page 6, measure all of your Fluid, the milk in your cereal, broth in your soup and ice cream because anything that melts at room temp is a liquid.              4.) Stop smoking and start exercising. Brisk walking is good, don't walk like you are going to the [employer] and DON"T WALK IN THE HEAT! Start low and increase as tolerated. Remember if you don't use it you lose it.              5.) Go to your appointments and call your team. Have your weight and BP/P ready when we call to do the Phone Check ins and call us if you have fluid gain or " questions    Pt reminded to follow Symptom tracker and to call at the onset of symptoms according to tracker.         Pt reminded to follow Symptom tracker and to call at the onset of symptoms according to tracker.     Reviewed plan for follow up once discharged to include phone calls, in person and virtual visits to assist pt optimizing their heart failure medication regimen and encouraging healthy lifestyle modifications.  Reminded pt that program will assist them over the next 4-6 weeks and then patient will be transferred to long term care provider .  Reminded pt how to contact HFTCC navigator via phone and or via Smartio.     Pt instructed appointment will be printed on hospital discharge paperwork.     Pt also reminded RN will call 48-72 hours after discharge to check on them.     PT can verbalize read back of information given.  Encouraged pt to read over information often and contact team with any questions or concerns.     PT states he needs appt @ 2PM as he rises at 12 noon.

## 2024-01-19 NOTE — ED NOTES
Telemetry Verification   Patient placed on Telemetry Box  Verified with War Room  Box # [id number]   Monitor Tech Kasin   Rate 96   Rhythm Normal Sinus

## 2024-01-19 NOTE — PLAN OF CARE
Inpatient Upgrade Note    Yong Mcintyre has warranted treatment spanning two or more midnights of hospital level care for the management of heart failure. He continues to require supplemental oxygen and IV diuresis . His condition is also complicated by the following comorbidities: Hypertension, Obesity, and A-Fib, Pulmonary HTN .

## 2024-01-19 NOTE — PLAN OF CARE
[facility]iology Stepdown  Initial Discharge Assessment       Primary Care Provider: Gianni Escalona MD    Admission Diagnosis: Shortness of breath [R06.02]  (HFpEF) heart failure with preserved ejection fraction [I50.30]    Admission Date: 1/18/2024  Expected Discharge Date: 1/22/2024    Transition of Care Barriers: None    Payor: HU[employer] MANAGED MEDICARE / Plan: HU[employer] MEDICARE SELECT PARTNER / Product Type: Medicare Advantage /     Extended Emergency Contact Information  Primary Emergency Contact: Alphonse Mcintyre  Address: [address] [place] [address] [facility] [place]  Mobile Phone: [phone]  Relation: Father  Secondary Emergency Contact: Bernardino Mcintyre  Address: [address] [place]  Mobile Phone: [phone]  Relation: Mother    Discharge Plan A: Home  Discharge Plan B: Home      [id number] - [place], [place] - [address] [place] [address] AT [facility] & [address] [address]  Phone: [phone] Fax: [phone]    [facility] [address] [facility]  [address] [facility] [address]  Phone: [phone] Fax: [phone]    [facility]  [address] [facility] [address]  Phone: [phone] Fax: [phone]    [facility]  [address]  [facility] [address]  Phone: [phone] Fax: [phone]      Initial Assessment (most recent)       Adult Discharge Assessment - 01/19/24 1334          Discharge Assessment    Assessment Type Discharge Planning Assessment     Confirmed/corrected address, phone number and insurance Yes     Confirmed Demographics Correct on Facesheet     Source of Information patient     Communicated ESTEBAN with patient/caregiver Date not available/Unable to determine     Reason For Admission acute on chronic heart failure with preserved ejection fraction     People  in Home alone     Facility Arrived From: home     Do you expect to return to your current living situation? Yes     Do you have help at home or someone to help you manage your care at home? No     Prior to hospitilization cognitive status: Alert/Oriented     Current cognitive status: Alert/Oriented     Walking or Climbing Stairs Difficulty no     Dressing/Bathing Difficulty no     Equipment Currently Used at Home CPAP;nebulizer     Readmission within 30 days? No     Patient currently being followed by outpatient case management? No     Do you currently have service(s) that help you manage your care at home? No     Do you take prescription medications? Yes     Do you have prescription coverage? Yes     Coverage [employer]     Do you have any problems affording any of your prescribed medications? No     Is the patient taking medications as prescribed? yes     Who is going to help you get home at discharge? drove self here and plans to drive home but if needed one of his parents can bring him home.     How do you get to doctors appointments? car, drives self     Are you on dialysis? No     Do you take coumadin? Yes   stated he has an appointment with his [facility]. cardiologist on     Who monitors your labs? followed by the coumadin clinic.     Discharge Plan A Home     Discharge Plan B Home     DME Needed Upon Discharge  none     Discharge Plan discussed with: Patient     Transition of Care Barriers None                        CM met with the patient  at the bedside and discussed the discharge plan. Gave him the discharge booklet and placed contact numbers on the white board in the room. Patient alert and sitting up in bed.  Patient verified his name , , PCP, Insurance and Pharmacy . Stated he lives alone in a single story house  and has 4 steps to point of entry . He is on coumadin and followed per the coumadin clinic and seen per his cardiologist here at [facility]. He has not had home health and he is not  a  dialysis patient . DME's include:a C-PAP and a nebulizer .  Stated he takes  medication as prescribed and has no problems getting  medication.  Patient does not want bedside delivery but wants his pharmacy notified of any new medication.       Discharge Plan A and Plan B have been determined by review of patient's clinical status, future medical and therapeutic needs, and coverage/benefits for post-acute care in coordination with multidisciplinary team members.      Damion Jarvis RN         [phone]

## 2024-01-19 NOTE — ASSESSMENT & PLAN NOTE
Patient with Hypercapnic and Hypoxic Respiratory failure which is Acute on chronic.  he is on home oxygen at 3 LPM. CXR with underlying edema.    Likely related to CHF exacerbation. Patient is a chronic CO2 retained with Pickwickian syndrome.    -Continuous oxygen and pulse ox  -BiPAP QHS

## 2024-01-19 NOTE — ASSESSMENT & PLAN NOTE
Body mass index is 44.44 kg/m². Morbid obesity complicates all aspects of disease management from diagnostic modalities to treatment. Weight loss encouraged and health benefits explained to patient.     - BiPAP QHS as tolerated

## 2024-01-19 NOTE — ED TRIAGE NOTES
"Yong Mcintyre, an 48 y.o. male presents to the ED POV with c/o SOB x "1 or 2 days" and bilateral leg swelling x "a couple weeks". Hx pulmonary HTN and CHF      Chief Complaint   Patient presents with    Shortness of Breath     Hx pulmonary hx/ CHF.      Review of patient's allergies indicates:   Allergen Reactions    Lipitor [atorvastatin] Other (See Comments)     "it makes my legs feel like jelly"  Other reaction(s): causes legs to hurt     Past Medical History:   Diagnosis Date    Arthritis     CHF (congestive heart failure)     Diastolic dysfunction    Cor pulmonale 11/05/2012    Gallstones     Hypertension     Morbid obesity 11/05/2012    Obesity hypoventilation syndrome     On home oxygen therapy 03/07/2014    MALLIKA (obstructive sleep apnea)     BPAP 16/11 with 3 L at night (continuous O2).    Paroxysmal atrial fibrillation     PFO (patent foramen ovale) 11/05/2012    status post closure    Pickwickian syndrome 11/05/2012    Pulmonary hypertension      "

## 2024-01-19 NOTE — NURSING
Nurses Note -- 4 Eyes      1/19/2024   4:38 AM      Skin assessed during: Admit      [x] No Altered Skin Integrity Present    []Prevention Measures Documented      [] Yes- Altered Skin Integrity Present or Discovered   [] LDA Added if Not in Epic (Describe Wound)   [] New Altered Skin Integrity was Present on Admit and Documented in LDA   [] Wound Image Taken    Wound Care Consulted? No    Attending Nurse:  Chris Natarajan RN/Staff Member:   Chaz MOREL       Patient had no skin breakdown upon arrival from ED this morning around 04:00. Breathing rate WNL and SpO2 at 93% on 10L high flow NC.

## 2024-01-20 PROBLEM — I50.812 CHRONIC RIGHT-SIDED HEART FAILURE: Chronic | Status: ACTIVE | Noted: 2024-01-20

## 2024-01-20 PROBLEM — I50.812 CHRONIC RIGHT-SIDED HEART FAILURE: Status: ACTIVE | Noted: 2024-01-20

## 2024-01-20 LAB
ALBUMIN SERPL BCP-MCNC: 2.5 G/DL (ref 3.5–5.2)
ALP SERPL-CCNC: 97 U/L (ref 55–135)
ALT SERPL W/O P-5'-P-CCNC: 12 U/L (ref 10–44)
ANION GAP SERPL CALC-SCNC: 9 MMOL/L (ref 8–16)
ANION GAP SERPL CALC-SCNC: 9 MMOL/L (ref 8–16)
AST SERPL-CCNC: 20 U/L (ref 10–40)
BASOPHILS # BLD AUTO: 0.03 K/UL (ref 0–0.2)
BASOPHILS NFR BLD: 0.5 % (ref 0–1.9)
BILIRUB SERPL-MCNC: 1.3 MG/DL (ref 0.1–1)
BUN SERPL-MCNC: 83 MG/DL (ref 6–20)
BUN SERPL-MCNC: 93 MG/DL (ref 6–20)
CALCIUM SERPL-MCNC: 9.3 MG/DL (ref 8.7–10.5)
CALCIUM SERPL-MCNC: 9.6 MG/DL (ref 8.7–10.5)
CHLORIDE SERPL-SCNC: 86 MMOL/L (ref 95–110)
CHLORIDE SERPL-SCNC: 86 MMOL/L (ref 95–110)
CO2 SERPL-SCNC: 41 MMOL/L (ref 23–29)
CO2 SERPL-SCNC: 45 MMOL/L (ref 23–29)
CREAT SERPL-MCNC: 2 MG/DL (ref 0.5–1.4)
CREAT SERPL-MCNC: 2 MG/DL (ref 0.5–1.4)
DIFFERENTIAL METHOD BLD: ABNORMAL
EOSINOPHIL # BLD AUTO: 0 K/UL (ref 0–0.5)
EOSINOPHIL NFR BLD: 0 % (ref 0–8)
ERYTHROCYTE [DISTWIDTH] IN BLOOD BY AUTOMATED COUNT: 20.3 % (ref 11.5–14.5)
EST. GFR  (NO RACE VARIABLE): 40.4 ML/MIN/1.73 M^2
EST. GFR  (NO RACE VARIABLE): 40.4 ML/MIN/1.73 M^2
GLUCOSE SERPL-MCNC: 120 MG/DL (ref 70–110)
GLUCOSE SERPL-MCNC: 83 MG/DL (ref 70–110)
HCT VFR BLD AUTO: 51.6 % (ref 40–54)
HGB BLD-MCNC: 14.4 G/DL (ref 14–18)
IMM GRANULOCYTES # BLD AUTO: 0.01 K/UL (ref 0–0.04)
IMM GRANULOCYTES NFR BLD AUTO: 0.2 % (ref 0–0.5)
INR PPP: 2.6 (ref 0.8–1.2)
LYMPHOCYTES # BLD AUTO: 1.1 K/UL (ref 1–4.8)
LYMPHOCYTES NFR BLD: 17.5 % (ref 18–48)
MAGNESIUM SERPL-MCNC: 1.8 MG/DL (ref 1.6–2.6)
MCH RBC QN AUTO: 24.5 PG (ref 27–31)
MCHC RBC AUTO-ENTMCNC: 27.9 G/DL (ref 32–36)
MCV RBC AUTO: 88 FL (ref 82–98)
MONOCYTES # BLD AUTO: 0.5 K/UL (ref 0.3–1)
MONOCYTES NFR BLD: 8.1 % (ref 4–15)
NEUTROPHILS # BLD AUTO: 4.5 K/UL (ref 1.8–7.7)
NEUTROPHILS NFR BLD: 73.7 % (ref 38–73)
NRBC BLD-RTO: 0 /100 WBC
PHOSPHATE SERPL-MCNC: 3.5 MG/DL (ref 2.7–4.5)
PLATELET # BLD AUTO: 171 K/UL (ref 150–450)
PMV BLD AUTO: 9.4 FL (ref 9.2–12.9)
POTASSIUM SERPL-SCNC: 3 MMOL/L (ref 3.5–5.1)
POTASSIUM SERPL-SCNC: 3.6 MMOL/L (ref 3.5–5.1)
PROT SERPL-MCNC: 6.8 G/DL (ref 6–8.4)
PROTHROMBIN TIME: 26.4 SEC (ref 9–12.5)
RBC # BLD AUTO: 5.87 M/UL (ref 4.6–6.2)
SODIUM SERPL-SCNC: 136 MMOL/L (ref 136–145)
SODIUM SERPL-SCNC: 140 MMOL/L (ref 136–145)
WBC # BLD AUTO: 6.16 K/UL (ref 3.9–12.7)

## 2024-01-20 PROCEDURE — 94761 N-INVAS EAR/PLS OXIMETRY MLT: CPT | Mod: HCNC

## 2024-01-20 PROCEDURE — 25000242 PHARM REV CODE 250 ALT 637 W/ HCPCS: Mod: HCNC

## 2024-01-20 PROCEDURE — 63600175 PHARM REV CODE 636 W HCPCS: Mod: HCNC

## 2024-01-20 PROCEDURE — 94660 CPAP INITIATION&MGMT: CPT | Mod: HCNC

## 2024-01-20 PROCEDURE — 27100171 HC OXYGEN HIGH FLOW UP TO 24 HOURS: Mod: HCNC

## 2024-01-20 PROCEDURE — 85610 PROTHROMBIN TIME: CPT | Mod: HCNC

## 2024-01-20 PROCEDURE — 99900035 HC TECH TIME PER 15 MIN (STAT): Mod: HCNC

## 2024-01-20 PROCEDURE — 80053 COMPREHEN METABOLIC PANEL: CPT | Mod: HCNC

## 2024-01-20 PROCEDURE — 83735 ASSAY OF MAGNESIUM: CPT | Mod: HCNC

## 2024-01-20 PROCEDURE — 36415 COLL VENOUS BLD VENIPUNCTURE: CPT | Mod: HCNC,XB

## 2024-01-20 PROCEDURE — 84100 ASSAY OF PHOSPHORUS: CPT | Mod: HCNC

## 2024-01-20 PROCEDURE — 85025 COMPLETE CBC W/AUTO DIFF WBC: CPT | Mod: HCNC

## 2024-01-20 PROCEDURE — 80048 BASIC METABOLIC PNL TOTAL CA: CPT | Mod: HCNC,XB

## 2024-01-20 PROCEDURE — 25000003 PHARM REV CODE 250: Mod: HCNC

## 2024-01-20 PROCEDURE — 20600001 HC STEP DOWN PRIVATE ROOM: Mod: HCNC

## 2024-01-20 RX ORDER — POTASSIUM CHLORIDE 20 MEQ/1
40 TABLET, EXTENDED RELEASE ORAL
Status: DISCONTINUED | OUTPATIENT
Start: 2024-01-20 | End: 2024-01-20

## 2024-01-20 RX ORDER — FUROSEMIDE 10 MG/ML
120 INJECTION INTRAMUSCULAR; INTRAVENOUS EVERY 8 HOURS
Status: DISCONTINUED | OUTPATIENT
Start: 2024-01-20 | End: 2024-01-23

## 2024-01-20 RX ORDER — WARFARIN SODIUM 5 MG/1
5 TABLET ORAL DAILY
Status: DISCONTINUED | OUTPATIENT
Start: 2024-01-21 | End: 2024-01-25

## 2024-01-20 RX ORDER — MAGNESIUM SULFATE HEPTAHYDRATE 40 MG/ML
2 INJECTION, SOLUTION INTRAVENOUS ONCE
Status: COMPLETED | OUTPATIENT
Start: 2024-01-20 | End: 2024-01-20

## 2024-01-20 RX ADMIN — METOPROLOL TARTRATE 25 MG: 25 TABLET, FILM COATED ORAL at 09:01

## 2024-01-20 RX ADMIN — METOPROLOL TARTRATE 25 MG: 25 TABLET, FILM COATED ORAL at 08:01

## 2024-01-20 RX ADMIN — MONTELUKAST 10 MG: 10 TABLET, FILM COATED ORAL at 09:01

## 2024-01-20 RX ADMIN — HEPARIN SODIUM 5000 UNITS: 5000 INJECTION INTRAVENOUS; SUBCUTANEOUS at 05:01

## 2024-01-20 RX ADMIN — HEPARIN SODIUM 5000 UNITS: 5000 INJECTION INTRAVENOUS; SUBCUTANEOUS at 01:01

## 2024-01-20 RX ADMIN — MAGNESIUM SULFATE HEPTAHYDRATE 2 G: 40 INJECTION, SOLUTION INTRAVENOUS at 08:01

## 2024-01-20 RX ADMIN — TIOTROPIUM BROMIDE INHALATION SPRAY 2 PUFF: 3.12 SPRAY, METERED RESPIRATORY (INHALATION) at 09:01

## 2024-01-20 RX ADMIN — HEPARIN SODIUM 5000 UNITS: 5000 INJECTION INTRAVENOUS; SUBCUTANEOUS at 09:01

## 2024-01-20 RX ADMIN — FUROSEMIDE 120 MG: 10 INJECTION, SOLUTION INTRAVENOUS at 09:01

## 2024-01-20 RX ADMIN — POTASSIUM BICARBONATE 40 MEQ: 391 TABLET, EFFERVESCENT ORAL at 08:01

## 2024-01-20 RX ADMIN — FUROSEMIDE 120 MG: 10 INJECTION, SOLUTION INTRAVENOUS at 02:01

## 2024-01-20 RX ADMIN — FLUTICASONE FUROATE AND VILANTEROL TRIFENATATE 1 PUFF: 100; 25 POWDER RESPIRATORY (INHALATION) at 09:01

## 2024-01-20 RX ADMIN — PANTOPRAZOLE SODIUM 40 MG: 40 TABLET, DELAYED RELEASE ORAL at 08:01

## 2024-01-20 NOTE — PLAN OF CARE
Patient in bed with call light and urinal in reach resting comfortably, 10L high flow NC; administered lasix 120mg and Magnesium.

## 2024-01-20 NOTE — PROGRESS NOTES
[facility] Medicine  Progress Note    Patient Name: Yong Mcintyre  MRN: [id number]  Patient Class: IP- Inpatient   Admission Date: 1/18/2024  Length of Stay: 1 days  Attending Physician: Campbell Velarde, *  Primary Care Provider: Gianni Escalona MD        Subjective:     Principal Problem:Acute on chronic heart failure with preserved ejection fraction (HFpEF)        HPI:  Patient is a 47 yo male with a PMH of HFpEF, pickwickian syndrome, HTN, pHTN, Chronic hypoxic respiratory failure (baseline oxygen req at home is 3 L), PFO (unsuccessful closure in 2006), AF (on warfarin for unknown reason) who present with worsening leg swelling and shortness of breath for the past 1 week. He has associated symptoms of orthopnea. He was admitted last month with similar symptoms and was discharged on lasix and metolazone and told to weigh himself daily as he may need dosage adjustment of his diuretics. Patient states he has been compliant with his medications with good urine output but has not been weighing himself. He denies any chest pain, nausea, vomiting, lightheadedness, or dizziness. Interview difficult as patient currently on Bipap.    While in the ED: Patient with oxygen saturation at 79%. Labs reveal worsened BNP >1000. New AARON with Cr 2.7 (up from baseline of 1.5). Initial VBG with pH 7.32 and CO2 83. Initially put on BIPAP but weaned off to high flow nasal cannula. ABG with A total 120 mg of IV Lasix given.      Overview/Hospital Course:  Patient was admitted with fluid overload in the setting of acute on chronic heart failure. He was admitted to the floor on 10 L HFNC, and he was started on 60 mg Lasix IV BID with less than 2L UOP daily. Lasix was later increased to 120 mg IV BID and eventually TID with improved UOP of 2.8 L daily. Continuing to diurese well with IV Lasix, while attempting to wean down oxygen requirements.    Interval History: NAEON. AFVSS. Patient with 2.85 L UOP  on 120 mg Lasix IV BID. Still with signs of fluid overload on exam, increased Lasix to 120 mg TID. AARON improving following diuresis to Cr of 2.0 from 2.3. He is still requiring 10 L O2, will continue to wean down as tolerable.    Review of Systems  Objective:     Vital Signs (Most Recent):  Temp: 97.3 °F (36.3 °C) (01/20/24 1308)  Pulse: 63 (01/20/24 1308)  Resp: 18 (01/20/24 1308)  BP: (!) 105/57 (01/20/24 1308)  SpO2: 96 % (01/20/24 1308) Vital Signs (24h Range):  Temp:  [97.3 °F (36.3 °C)-98.1 °F (36.7 °C)] 97.3 °F (36.3 °C)  Pulse:  [] 63  Resp:  [13-22] 18  SpO2:  [91 %-96 %] 96 %  BP: ()/(55-65) 105/57     Weight: 116.9 kg (257 lb 11.5 oz)  Body mass index is 43.55 kg/m².    Intake/Output Summary (Last 24 hours) at 1/20/2024 1544  Last data filed at 1/20/2024 1326  Gross per 24 hour   Intake 942 ml   Output 2600 ml   Net -1658 ml         Physical Exam  Constitutional:       General: He is not in acute distress.     Appearance: Normal appearance. He is not ill-appearing.   HENT:      Head: Normocephalic and atraumatic.      Nose: Nose normal.      Mouth/Throat:      Mouth: Mucous membranes are moist.   Eyes:      Extraocular Movements: Extraocular movements intact.      Conjunctiva/sclera: Conjunctivae normal.      Pupils: Pupils are equal, round, and reactive to light.   Cardiovascular:      Rate and Rhythm: Normal rate and regular rhythm.      Pulses: Normal pulses.      Heart sounds: Normal heart sounds. No murmur heard.  Pulmonary:      Effort: Pulmonary effort is normal. No respiratory distress.      Breath sounds: Rales present. No wheezing or rhonchi.   Abdominal:      General: Abdomen is flat. Bowel sounds are normal.      Palpations: Abdomen is soft.   Musculoskeletal:         General: Swelling present.      Right lower leg: Edema present.      Left lower leg: Edema present.   Skin:     General: Skin is warm and dry.      Capillary Refill: Capillary refill takes less than 2 seconds.    Neurological:      Mental Status: He is alert and oriented to person, place, and time.   Psychiatric:         Mood and Affect: Mood normal.         Behavior: Behavior normal.         Thought Content: Thought content normal.         Judgment: Judgment normal.             Significant Labs: All pertinent labs within the past 24 hours have been reviewed.    Significant Imaging: I have reviewed all pertinent imaging results/findings within the past 24 hours.    Assessment/Plan:      * Acute on chronic heart failure with preserved ejection fraction (HFpEF)  Patient is identified as having Diastolic (HFpEF) heart failure that is Acute on chronic. CHF is currently uncontrolled due to Continued edema of extremities, >3 pillow orthopnea, and Rales/crackles on pulmonary exam. CXR with underlying edema.    Echo (01/19)   Left Ventricle: The left ventricle is normal in size. Normal wall thickness. Normal wall motion. Septal flattening in systole consistent with right ventricular pressure overload. There is normal systolic function. Ejection fraction by visual approximation is 55%. There is normal diastolic function.    Right Ventricle: Severe right ventricular enlargement. Wall thickness is normal. Right ventricle wall motion has global hypokinesis. Systolic function is severely reduced.    Tricuspid Valve: There is moderate to severe regurgitation.    Pulmonary Artery: There is moderate to severe pulmonary hypertension. The estimated pulmonary artery systolic pressure is 67 mmHg.    IVC/SVC: Intermediate venous pressure at 8 mmHg.    Pericardium: There is a trivial effusion.    Recent Labs   Lab 01/18/24  2216   BNP 1,073*       -Patient given 120mg IV lasix in ED  -Increased Lasix to 120 mg IV TID  -Consider restarting metolazone if urine output still not to goal  -Continue Beta Blocker, rest of GDMT is precluded by AARON  -Can consider Bidil     -Monitor strict Is&Os and daily weights.    -Place on fluid restriction of 1.5 L.    -Low salt diet   -Cardiology has not been consulted, consider consulting if heart failure remains uncontrolled      Chronic right-sided heart failure  Repeat Echo showing severe right ventricular enlargement, normal wall thickness, global hypokinesis of right ventricular wall, and systolic function is severely reduced.       Epigastric abdominal pain  Described as bloating.  -Has follow up with GI on February 2nd      Chronic asthmatic bronchitis  - Continue home inhaler alternatives       Acute kidney injury superimposed on CKD  Patient with acute kidney injury/acute renal failure likely due to pre-renal azotemia due to IVVD AARON is currently improving. Baseline creatinine  1.3  - Labs reviewed- Renal function/electrolytes with Estimated Creatinine Clearance: 53 mL/min (A) (based on SCr of 2 mg/dL (H)). according to latest data. Monitor urine output and serial BMP and adjust therapy as needed. Avoid nephrotoxins and renally dose meds for GFR listed above.    Acute on chronic respiratory failure with hypoxia and hypercapnia  Patient with Hypercapnic and Hypoxic Respiratory failure which is Acute on chronic.  he is on home oxygen at 3 LPM. CXR with underlying edema.    Likely related to CHF exacerbation. Patient is a chronic CO2 retained with Pickwickian syndrome.    -Continuous oxygen and pulse ox  -BiPAP QHS      MALLIKA (obstructive sleep apnea)  -BiPAP QHS      PFO (patent foramen ovale)  Thought to be cause of patient's structural heart disease.      Pulmonary hypertension  Managed by pulmonology with [facility] with close follow ups  Sildenafil recently discontinued  Remains on 3L NC at baseline      Pickwickian syndrome  Body mass index is 43.55 kg/m². Morbid obesity complicates all aspects of disease management from diagnostic modalities to treatment. Weight loss encouraged and health benefits explained to patient.     - BiPAP QHS as tolerated         VTE Risk Mitigation (From admission, onward)           Ordered      heparin (porcine) injection 5,000 Units  Every 8 hours         01/19/24 0208     IP VTE HIGH RISK PATIENT  Once         01/19/24 0208     Place sequential compression device  Until discontinued         01/19/24 0208                    Discharge Planning   ESTEBAN: 1/23/2024     Code Status: Full Code   Is the patient medically ready for discharge?: No    Reason for patient still in hospital (select all that apply): Patient unstable, Patient trending condition, and Treatment  Discharge Plan A: Home                  Andre Calvo DO  Department of Hospital Medicine   [facility]

## 2024-01-20 NOTE — ASSESSMENT & PLAN NOTE
Body mass index is 43.55 kg/m². Morbid obesity complicates all aspects of disease management from diagnostic modalities to treatment. Weight loss encouraged and health benefits explained to patient.     - BiPAP QHS as tolerated

## 2024-01-20 NOTE — HOSPITAL COURSE
Patient was admitted to  with AHRF in setting of decompensated heart failure. Patient was initially diuresing well with 120mg TID IVP lasix and he was weaned down from 11L NC (on arrival) to 5-6L NC, however UOP started gradually decreasing. Now on lasix drip at 15 mg/hr and daily Metolazone 10mg and currently satting well on 4 L. HTS is following and agree with continued, aggressive diuresis and is tentatively planning for RHC 01/29 once patient becomes euvolemic; delayed as patient is not euvolemic. Will continue to diurese and plan to RHC. Diuresis with lasix gtt and metolazone paused since patient appears to euvolemic and alkalosis is worsening with Cr bump. RHC completed 01/31/24. Patient ith PAH group 3, HTS recommend starting therapies outpatient. Discontinued BB due to PAH group 3; BB worsens his known RV dysfunction. Placed on lasix PO 80 mg BID, however due to low UOP was switched to Torsemide PO 60mg BID. Renal function back to baseline, patient stable for discharged to home with close follow-up with [facility] pulm, as well as HTS and [facility].

## 2024-01-20 NOTE — PLAN OF CARE
Problem: Adult Inpatient Plan of Care  Goal: Plan of Care Review  Outcome: Ongoing, Progressing  Goal: Patient-Specific Goal (Individualized)  Outcome: Ongoing, Progressing  Goal: Absence of Hospital-Acquired Illness or Injury  Outcome: Ongoing, Progressing  Goal: Optimal Comfort and Wellbeing  Outcome: Ongoing, Progressing  Goal: Readiness for Transition of Care  Outcome: Ongoing, Progressing     Problem: Bariatric Environmental Safety  Goal: Safety Maintained with Care  Outcome: Ongoing, Progressing     Problem: Impaired Wound Healing  Goal: Optimal Wound Healing  Outcome: Ongoing, Progressing     Problem: Renal Function Impairment (Acute Kidney Injury/Impairment)  Goal: Effective Renal Function  Outcome: Ongoing, Progressing     Problem: Oral Intake Inadequate (Acute Kidney Injury/Impairment)  Goal: Optimal Nutrition Intake  Outcome: Ongoing, Progressing

## 2024-01-20 NOTE — SUBJECTIVE & OBJECTIVE
Interval History: NAEON. AFVSS. Patient with 2.85 L UOP on 120 mg Lasix IV BID. Still with signs of fluid overload on exam, increased Lasix to 120 mg TID. AAORN improving following diuresis to Cr of 2.0 from 2.3. He is still requiring 10 L O2, will continue to wean down as tolerable.    Review of Systems  Objective:     Vital Signs (Most Recent):  Temp: 97.3 °F (36.3 °C) (01/20/24 1308)  Pulse: 63 (01/20/24 1308)  Resp: 18 (01/20/24 1308)  BP: (!) 105/57 (01/20/24 1308)  SpO2: 96 % (01/20/24 1308) Vital Signs (24h Range):  Temp:  [97.3 °F (36.3 °C)-98.1 °F (36.7 °C)] 97.3 °F (36.3 °C)  Pulse:  [] 63  Resp:  [13-22] 18  SpO2:  [91 %-96 %] 96 %  BP: ()/(55-65) 105/57     Weight: 116.9 kg (257 lb 11.5 oz)  Body mass index is 43.55 kg/m².    Intake/Output Summary (Last 24 hours) at 1/20/2024 1544  Last data filed at 1/20/2024 1326  Gross per 24 hour   Intake 942 ml   Output 2600 ml   Net -1658 ml         Physical Exam  Constitutional:       General: He is not in acute distress.     Appearance: Normal appearance. He is not ill-appearing.   HENT:      Head: Normocephalic and atraumatic.      Nose: Nose normal.      Mouth/Throat:      Mouth: Mucous membranes are moist.   Eyes:      Extraocular Movements: Extraocular movements intact.      Conjunctiva/sclera: Conjunctivae normal.      Pupils: Pupils are equal, round, and reactive to light.   Cardiovascular:      Rate and Rhythm: Normal rate and regular rhythm.      Pulses: Normal pulses.      Heart sounds: Normal heart sounds. No murmur heard.  Pulmonary:      Effort: Pulmonary effort is normal. No respiratory distress.      Breath sounds: Rales present. No wheezing or rhonchi.   Abdominal:      General: Abdomen is flat. Bowel sounds are normal.      Palpations: Abdomen is soft.   Musculoskeletal:         General: Swelling present.      Right lower leg: Edema present.      Left lower leg: Edema present.   Skin:     General: Skin is warm and dry.      Capillary  Refill: Capillary refill takes less than 2 seconds.   Neurological:      Mental Status: He is alert and oriented to person, place, and time.   Psychiatric:         Mood and Affect: Mood normal.         Behavior: Behavior normal.         Thought Content: Thought content normal.         Judgment: Judgment normal.             Significant Labs: All pertinent labs within the past 24 hours have been reviewed.    Significant Imaging: I have reviewed all pertinent imaging results/findings within the past 24 hours.

## 2024-01-20 NOTE — ASSESSMENT & PLAN NOTE
Patient is identified as having Diastolic (HFpEF) heart failure that is Acute on chronic. CHF is currently uncontrolled due to Continued edema of extremities, >3 pillow orthopnea, and Rales/crackles on pulmonary exam. CXR with underlying edema.    Echo (01/19)   Left Ventricle: The left ventricle is normal in size. Normal wall thickness. Normal wall motion. Septal flattening in systole consistent with right ventricular pressure overload. There is normal systolic function. Ejection fraction by visual approximation is 55%. There is normal diastolic function.    Right Ventricle: Severe right ventricular enlargement. Wall thickness is normal. Right ventricle wall motion has global hypokinesis. Systolic function is severely reduced.    Tricuspid Valve: There is moderate to severe regurgitation.    Pulmonary Artery: There is moderate to severe pulmonary hypertension. The estimated pulmonary artery systolic pressure is 67 mmHg.    IVC/SVC: Intermediate venous pressure at 8 mmHg.    Pericardium: There is a trivial effusion.    Recent Labs   Lab 01/18/24  2216   BNP 1,073*       -Patient given 120mg IV lasix in ED  -Increased Lasix to 120 mg IV TID  -Consider restarting metolazone if urine output still not to goal  -Continue Beta Blocker, rest of GDMT is precluded by AARON  -Can consider Bidil     -Monitor strict Is&Os and daily weights.    -Place on fluid restriction of 1.5 L.   -Low salt diet   -Cardiology has not been consulted, consider consulting if heart failure remains uncontrolled

## 2024-01-20 NOTE — ASSESSMENT & PLAN NOTE
Repeat Echo showing severe right ventricular enlargement, normal wall thickness, global hypokinesis of right ventricular wall, and systolic function is severely reduced.

## 2024-01-20 NOTE — ASSESSMENT & PLAN NOTE
Patient with acute kidney injury/acute renal failure likely due to pre-renal azotemia due to IVVD AARON is currently improving. Baseline creatinine  1.3  - Labs reviewed- Renal function/electrolytes with Estimated Creatinine Clearance: 53 mL/min (A) (based on SCr of 2 mg/dL (H)). according to latest data. Monitor urine output and serial BMP and adjust therapy as needed. Avoid nephrotoxins and renally dose meds for GFR listed above.

## 2024-01-21 PROBLEM — J44.89 CHRONIC ASTHMATIC BRONCHITIS: Chronic | Status: ACTIVE | Noted: 2023-12-11

## 2024-01-21 LAB
ALBUMIN SERPL BCP-MCNC: 2.9 G/DL (ref 3.5–5.2)
ALP SERPL-CCNC: 112 U/L (ref 55–135)
ALT SERPL W/O P-5'-P-CCNC: 12 U/L (ref 10–44)
ANION GAP SERPL CALC-SCNC: 11 MMOL/L (ref 8–16)
ANION GAP SERPL CALC-SCNC: 15 MMOL/L (ref 8–16)
AST SERPL-CCNC: 19 U/L (ref 10–40)
BASOPHILS # BLD AUTO: 0.04 K/UL (ref 0–0.2)
BASOPHILS NFR BLD: 0.6 % (ref 0–1.9)
BILIRUB SERPL-MCNC: 2.3 MG/DL (ref 0.1–1)
BUN SERPL-MCNC: 72 MG/DL (ref 6–20)
BUN SERPL-MCNC: 74 MG/DL (ref 6–20)
CALCIUM SERPL-MCNC: 10.1 MG/DL (ref 8.7–10.5)
CALCIUM SERPL-MCNC: 9.9 MG/DL (ref 8.7–10.5)
CHLORIDE SERPL-SCNC: 86 MMOL/L (ref 95–110)
CHLORIDE SERPL-SCNC: 86 MMOL/L (ref 95–110)
CO2 SERPL-SCNC: 42 MMOL/L (ref 23–29)
CO2 SERPL-SCNC: 44 MMOL/L (ref 23–29)
CREAT SERPL-MCNC: 1.7 MG/DL (ref 0.5–1.4)
CREAT SERPL-MCNC: 1.8 MG/DL (ref 0.5–1.4)
DIFFERENTIAL METHOD BLD: ABNORMAL
EOSINOPHIL # BLD AUTO: 0 K/UL (ref 0–0.5)
EOSINOPHIL NFR BLD: 0 % (ref 0–8)
ERYTHROCYTE [DISTWIDTH] IN BLOOD BY AUTOMATED COUNT: 20.5 % (ref 11.5–14.5)
EST. GFR  (NO RACE VARIABLE): 45.9 ML/MIN/1.73 M^2
EST. GFR  (NO RACE VARIABLE): 49.1 ML/MIN/1.73 M^2
GLUCOSE SERPL-MCNC: 124 MG/DL (ref 70–110)
GLUCOSE SERPL-MCNC: 88 MG/DL (ref 70–110)
HCT VFR BLD AUTO: 54.5 % (ref 40–54)
HGB BLD-MCNC: 15.3 G/DL (ref 14–18)
IMM GRANULOCYTES # BLD AUTO: 0.02 K/UL (ref 0–0.04)
IMM GRANULOCYTES NFR BLD AUTO: 0.3 % (ref 0–0.5)
LYMPHOCYTES # BLD AUTO: 0.9 K/UL (ref 1–4.8)
LYMPHOCYTES NFR BLD: 13.6 % (ref 18–48)
MAGNESIUM SERPL-MCNC: 1.8 MG/DL (ref 1.6–2.6)
MCH RBC QN AUTO: 24.5 PG (ref 27–31)
MCHC RBC AUTO-ENTMCNC: 28.1 G/DL (ref 32–36)
MCV RBC AUTO: 87 FL (ref 82–98)
MONOCYTES # BLD AUTO: 0.7 K/UL (ref 0.3–1)
MONOCYTES NFR BLD: 10.1 % (ref 4–15)
NEUTROPHILS # BLD AUTO: 5.1 K/UL (ref 1.8–7.7)
NEUTROPHILS NFR BLD: 75.4 % (ref 38–73)
NRBC BLD-RTO: 0 /100 WBC
PHOSPHATE SERPL-MCNC: 3.1 MG/DL (ref 2.7–4.5)
PLATELET # BLD AUTO: 197 K/UL (ref 150–450)
PMV BLD AUTO: 9.8 FL (ref 9.2–12.9)
POTASSIUM SERPL-SCNC: 3.1 MMOL/L (ref 3.5–5.1)
POTASSIUM SERPL-SCNC: 3.3 MMOL/L (ref 3.5–5.1)
PROT SERPL-MCNC: 7.8 G/DL (ref 6–8.4)
RBC # BLD AUTO: 6.25 M/UL (ref 4.6–6.2)
SODIUM SERPL-SCNC: 141 MMOL/L (ref 136–145)
SODIUM SERPL-SCNC: 143 MMOL/L (ref 136–145)
WBC # BLD AUTO: 6.82 K/UL (ref 3.9–12.7)

## 2024-01-21 PROCEDURE — 99900035 HC TECH TIME PER 15 MIN (STAT): Mod: HCNC

## 2024-01-21 PROCEDURE — 80048 BASIC METABOLIC PNL TOTAL CA: CPT | Mod: HCNC,XB

## 2024-01-21 PROCEDURE — 94761 N-INVAS EAR/PLS OXIMETRY MLT: CPT | Mod: HCNC

## 2024-01-21 PROCEDURE — 27100171 HC OXYGEN HIGH FLOW UP TO 24 HOURS: Mod: HCNC

## 2024-01-21 PROCEDURE — 85025 COMPLETE CBC W/AUTO DIFF WBC: CPT | Mod: HCNC

## 2024-01-21 PROCEDURE — 36415 COLL VENOUS BLD VENIPUNCTURE: CPT | Mod: HCNC,XB

## 2024-01-21 PROCEDURE — 25000242 PHARM REV CODE 250 ALT 637 W/ HCPCS: Mod: HCNC

## 2024-01-21 PROCEDURE — 36415 COLL VENOUS BLD VENIPUNCTURE: CPT | Mod: HCNC

## 2024-01-21 PROCEDURE — 63600175 PHARM REV CODE 636 W HCPCS: Mod: HCNC

## 2024-01-21 PROCEDURE — 84100 ASSAY OF PHOSPHORUS: CPT | Mod: HCNC

## 2024-01-21 PROCEDURE — 25000003 PHARM REV CODE 250: Mod: HCNC

## 2024-01-21 PROCEDURE — 94640 AIRWAY INHALATION TREATMENT: CPT | Mod: HCNC

## 2024-01-21 PROCEDURE — 25000003 PHARM REV CODE 250: Mod: HCNC | Performed by: STUDENT IN AN ORGANIZED HEALTH CARE EDUCATION/TRAINING PROGRAM

## 2024-01-21 PROCEDURE — 83735 ASSAY OF MAGNESIUM: CPT | Mod: HCNC

## 2024-01-21 PROCEDURE — 20600001 HC STEP DOWN PRIVATE ROOM: Mod: HCNC

## 2024-01-21 PROCEDURE — 80053 COMPREHEN METABOLIC PANEL: CPT | Mod: HCNC

## 2024-01-21 RX ORDER — POTASSIUM CHLORIDE 20 MEQ/1
40 TABLET, EXTENDED RELEASE ORAL
Status: COMPLETED | OUTPATIENT
Start: 2024-01-21 | End: 2024-01-21

## 2024-01-21 RX ORDER — POTASSIUM CHLORIDE 20 MEQ/1
40 TABLET, EXTENDED RELEASE ORAL
Status: DISPENSED | OUTPATIENT
Start: 2024-01-21 | End: 2024-01-22

## 2024-01-21 RX ORDER — MAGNESIUM SULFATE HEPTAHYDRATE 40 MG/ML
2 INJECTION, SOLUTION INTRAVENOUS ONCE
Status: COMPLETED | OUTPATIENT
Start: 2024-01-21 | End: 2024-01-21

## 2024-01-21 RX ORDER — POTASSIUM CHLORIDE 20 MEQ/1
40 TABLET, EXTENDED RELEASE ORAL
Status: DISCONTINUED | OUTPATIENT
Start: 2024-01-22 | End: 2024-01-21

## 2024-01-21 RX ADMIN — METOPROLOL TARTRATE 25 MG: 25 TABLET, FILM COATED ORAL at 09:01

## 2024-01-21 RX ADMIN — WARFARIN SODIUM 5 MG: 5 TABLET ORAL at 04:01

## 2024-01-21 RX ADMIN — POTASSIUM CHLORIDE 40 MEQ: 1500 TABLET, EXTENDED RELEASE ORAL at 01:01

## 2024-01-21 RX ADMIN — FUROSEMIDE 120 MG: 10 INJECTION, SOLUTION INTRAVENOUS at 05:01

## 2024-01-21 RX ADMIN — MONTELUKAST 10 MG: 10 TABLET, FILM COATED ORAL at 09:01

## 2024-01-21 RX ADMIN — POTASSIUM CHLORIDE 40 MEQ: 1500 TABLET, EXTENDED RELEASE ORAL at 04:01

## 2024-01-21 RX ADMIN — POTASSIUM CHLORIDE 40 MEQ: 1500 TABLET, EXTENDED RELEASE ORAL at 09:01

## 2024-01-21 RX ADMIN — MAGNESIUM SULFATE HEPTAHYDRATE 2 G: 40 INJECTION, SOLUTION INTRAVENOUS at 05:01

## 2024-01-21 RX ADMIN — METOPROLOL TARTRATE 25 MG: 25 TABLET, FILM COATED ORAL at 08:01

## 2024-01-21 RX ADMIN — FUROSEMIDE 120 MG: 10 INJECTION, SOLUTION INTRAVENOUS at 09:01

## 2024-01-21 RX ADMIN — POTASSIUM CHLORIDE 40 MEQ: 1500 TABLET, EXTENDED RELEASE ORAL at 11:01

## 2024-01-21 RX ADMIN — FLUTICASONE FUROATE AND VILANTEROL TRIFENATATE 1 PUFF: 100; 25 POWDER RESPIRATORY (INHALATION) at 08:01

## 2024-01-21 RX ADMIN — TIOTROPIUM BROMIDE INHALATION SPRAY 2 PUFF: 3.12 SPRAY, METERED RESPIRATORY (INHALATION) at 08:01

## 2024-01-21 RX ADMIN — PANTOPRAZOLE SODIUM 40 MG: 40 TABLET, DELAYED RELEASE ORAL at 08:01

## 2024-01-21 NOTE — RESPIRATORY THERAPY
RAPID RESPONSE RESPIRATORY THERAPY PROACTIVE NOTE           Time of visit: 859     Code Status: Full Code   : 1975  Bed: 330/330 A:   MRN: [id number]    SITUATION    Evaluated patient for: Non-Invasive Positive Pressure Ventilation Compliance     BACKGROUND    Patient has a past medical history of Arthritis, CHF (congestive heart failure), Cor pulmonale, Gallstones, Hypertension, Morbid obesity, Obesity hypoventilation syndrome, On home oxygen therapy, MALLIKA (obstructive sleep apnea), Paroxysmal atrial fibrillation, PFO (patent foramen ovale), Pickwickian syndrome, and Pulmonary hypertension.  Clinically Significant Surgical Hx: None    24 Hours Vitals Range:  Temp:  [97.3 °F (36.3 °C)-99 °F (37.2 °C)]   Pulse:  []   Resp:  [14-19]   BP: ()/(52-77)   SpO2:  [87 %-96 %]     Labs:    Recent Labs     24  0527 24  0701 24  1642 24  0417 24  1304 24  0655   NA  --    < > 134* 136 140  --    K  --    < > 3.7 3.0* 3.6  --    CL  --    < > 89* 86* 86*  --    CO2  --    < > 36* 41* 45*  --    BUN  --    < > 92* 93* 83*  --    CREATININE  --    < > 2.3* 2.0* 2.0*  --    GLU  --    < > 109 83 120*  --    PHOS 3.4  --   --  3.5  --  3.1   MG 1.5*  --   --  1.8  --  1.8    < > = values in this interval not displayed.        Recent Labs     24  2222 24  2342 24  0104   PH 7.328* 7.328* 7.327*   PCO2 83.8* 60.3* 63.8*   PO2 25* 22* 20*   HCO3 44.0* 31.7* 33.4*   POCSATURATED 36 33 27   BE 18* 6* 7*       ASSESSMENT/INTERVENTIONS    Patient has BIPAP QHS order. Patient was compliant with wearing all night.    Last VS   Temp: 98.1 °F (36.7 °C) (818)  Pulse: 92 (842)  Resp: 18 (842)  BP: 113/60 (841)  SpO2: 94 % (818)    Level of Consciousness: Level of Consciousness (AVPU): alert  Respiratory Effort: Respiratory Effort: Unlabored Expansion/Accessory Muscle Usage: Expansion/Accessory Muscles/Retractions: no retractions, no use of  accessory muscles  All Lung Field Breath Sounds: All Lung Fields Breath Sounds: Anterior:, Lateral:, diminished  O2 Device/Concentration: 50%  NIPPV: Yes, Type: BIPAP QHS Settings: 18/12    Ambu at bedside:       Active Orders   Respiratory Care    Bipap QHS     Frequency: QHS     Number of Occurrences: Until Specified     Order Comments: Wean as tolerated       Order Questions:      Mode Bilevel      FiO2% 50      Inspiratory pressure: 18      Expiratory pressure: 12    Pulse Oximetry Continuous     Frequency: Continuous     Number of Occurrences: Until Specified       RECOMMENDATIONS    We recommend: RRT Recs: Continue POC per primary team.    FOLLOW-UP    Please call back the Rapid Response RT, Sue Fernandez, RRT at x [phone] for any questions or concerns.

## 2024-01-21 NOTE — PLAN OF CARE
Pt maintained free from falls/trauma/injuries and skin breakdown. Pt denied pain or discomfort. Plan of care reviewed. Pt verbalized understanding. All questions and concerns addressed. Will continue to monitor.  Problem: Adult Inpatient Plan of Care  Goal: Plan of Care Review  Outcome: Ongoing, Progressing  Goal: Patient-Specific Goal (Individualized)  Outcome: Ongoing, Progressing  Goal: Absence of Hospital-Acquired Illness or Injury  Outcome: Ongoing, Progressing  Goal: Optimal Comfort and Wellbeing  Outcome: Ongoing, Progressing  Goal: Readiness for Transition of Care  Outcome: Ongoing, Progressing     Problem: Bariatric Environmental Safety  Goal: Safety Maintained with Care  Outcome: Ongoing, Progressing     Problem: Impaired Wound Healing  Goal: Optimal Wound Healing  Outcome: Ongoing, Progressing     Problem: Fluid and Electrolyte Imbalance (Acute Kidney Injury/Impairment)  Goal: Fluid and Electrolyte Balance  Outcome: Ongoing, Progressing     Problem: Oral Intake Inadequate (Acute Kidney Injury/Impairment)  Goal: Optimal Nutrition Intake  Outcome: Ongoing, Progressing     Problem: Renal Function Impairment (Acute Kidney Injury/Impairment)  Goal: Effective Renal Function  Outcome: Ongoing, Progressing

## 2024-01-21 NOTE — RESPIRATORY THERAPY
RAPID RESPONSE RESPIRATORY THERAPY PROACTIVE ROUNDING NOTE             Time of visit: 1033     Code Status: Full Code   : 1975  Bed: 330/330 A:   MRN: [id number]  Time spent at the bedside: < 15 min    SITUATION    Evaluated patient for: HFNC Compliance     BACKGROUND    Patient has a past medical history of Arthritis, CHF (congestive heart failure), Cor pulmonale, Gallstones, Hypertension, Morbid obesity, Obesity hypoventilation syndrome, On home oxygen therapy, MALLIKA (obstructive sleep apnea), Paroxysmal atrial fibrillation, PFO (patent foramen ovale), Pickwickian syndrome, and Pulmonary hypertension.    24 Hours Vitals Range:  Temp:  [97.3 °F (36.3 °C)-99 °F (37.2 °C)]   Pulse:  []   Resp:  [14-19]   BP: ()/(52-77)   SpO2:  [87 %-96 %]     Labs:    Recent Labs     24  0527 24  0701 24  0417 24  1304 24  0655   NA  --    < > 136 140 141   K  --    < > 3.0* 3.6 3.1*   CL  --    < > 86* 86* 86*   CO2  --    < > 41* 45* 44*   BUN  --    < > 93* 83* 74*   CREATININE  --    < > 2.0* 2.0* 1.8*   GLU  --    < > 83 120* 88   PHOS 3.4  --  3.5  --  3.1   MG 1.5*  --  1.8  --  1.8    < > = values in this interval not displayed.        Recent Labs     24  2222 24  2342 24  0104   PH 7.328* 7.328* 7.327*   PCO2 83.8* 60.3* 63.8*   PO2 25* 22* 20*   HCO3 44.0* 31.7* 33.4*   POCSATURATED 36 33 27   BE 18* 6* 7*       ASSESSMENT/INTERVENTIONS    Last VS   Temp: 98.1 °F (36.7 °C) (818)  Pulse: 101 (922)  Resp: 18 (922)  BP: 113/60 (841)  SpO2: 90 % (922)    Level of Consciousness: Level of Consciousness (AVPU): alert  Respiratory Effort: Respiratory Effort: Normal, Unlabored Expansion/Accessory Muscle Usage: Expansion/Accessory Muscles/Retractions: expansion symmetric, no retractions  All Lung Field Breath Sounds: All Lung Fields Breath Sounds: Anterior:, Lateral:  O2 Device/Concentration: 8L HFNC  Was the O2 device able to be  weaned? No  Ambu at bedside:      Active Orders   Respiratory Care    Bipap QHS     Frequency: QHS     Number of Occurrences: Until Specified     Order Comments: Wean as tolerated       Order Questions:      Mode Bilevel      FiO2% 50      Inspiratory pressure: 18      Expiratory pressure: 12    Oxygen Continuous     Frequency: Continuous     Number of Occurrences: Until Specified     Order Questions:      Device type: High flow      Device: High Flow Nasal Cannula (6 -15 Liters)      LPM: 6-15      Titrate O2 per Oxygen Titration Protocol: Yes      To maintain SpO2 goal of: >= 90%      Notify MD of: Inability to achieve desired SpO2; Sudden change in patient status and requires 20% increase in FiO2; Patient requires >60% FiO2    Pulse Oximetry Continuous     Frequency: Continuous     Number of Occurrences: Until Specified       RECOMMENDATIONS    We recommend: RRT Recs: Continue POC per primary team.      FOLLOW-UP    Please call back the Rapid Response RT, Sue Fernandez RRT at x [phone] for any questions or concerns.

## 2024-01-21 NOTE — NURSING
1800: unable to wean o2 during day, primary team aware, consulting HTS. Pt voices no needs. BIPAP in place per patient wishes  while napping

## 2024-01-22 LAB
ALBUMIN SERPL BCP-MCNC: 2.8 G/DL (ref 3.5–5.2)
ALP SERPL-CCNC: 102 U/L (ref 55–135)
ALT SERPL W/O P-5'-P-CCNC: 12 U/L (ref 10–44)
ANION GAP SERPL CALC-SCNC: 10 MMOL/L (ref 8–16)
ANION GAP SERPL CALC-SCNC: 9 MMOL/L (ref 8–16)
AST SERPL-CCNC: 23 U/L (ref 10–40)
BASOPHILS # BLD AUTO: 0.04 K/UL (ref 0–0.2)
BASOPHILS NFR BLD: 0.6 % (ref 0–1.9)
BILIRUB SERPL-MCNC: 2.3 MG/DL (ref 0.1–1)
BUN SERPL-MCNC: 64 MG/DL (ref 6–20)
BUN SERPL-MCNC: 68 MG/DL (ref 6–20)
CALCIUM SERPL-MCNC: 9.6 MG/DL (ref 8.7–10.5)
CALCIUM SERPL-MCNC: 9.9 MG/DL (ref 8.7–10.5)
CHLORIDE SERPL-SCNC: 88 MMOL/L (ref 95–110)
CHLORIDE SERPL-SCNC: 89 MMOL/L (ref 95–110)
CO2 SERPL-SCNC: 41 MMOL/L (ref 23–29)
CO2 SERPL-SCNC: 43 MMOL/L (ref 23–29)
CREAT SERPL-MCNC: 1.7 MG/DL (ref 0.5–1.4)
CREAT SERPL-MCNC: 1.8 MG/DL (ref 0.5–1.4)
DIFFERENTIAL METHOD BLD: ABNORMAL
EOSINOPHIL # BLD AUTO: 0 K/UL (ref 0–0.5)
EOSINOPHIL NFR BLD: 0 % (ref 0–8)
ERYTHROCYTE [DISTWIDTH] IN BLOOD BY AUTOMATED COUNT: 20.7 % (ref 11.5–14.5)
EST. GFR  (NO RACE VARIABLE): 45.9 ML/MIN/1.73 M^2
EST. GFR  (NO RACE VARIABLE): 49.1 ML/MIN/1.73 M^2
GLUCOSE SERPL-MCNC: 117 MG/DL (ref 70–110)
GLUCOSE SERPL-MCNC: 86 MG/DL (ref 70–110)
HCT VFR BLD AUTO: 56.8 % (ref 40–54)
HGB BLD-MCNC: 15.6 G/DL (ref 14–18)
IMM GRANULOCYTES # BLD AUTO: 0.01 K/UL (ref 0–0.04)
IMM GRANULOCYTES NFR BLD AUTO: 0.1 % (ref 0–0.5)
INR PPP: 1.6 (ref 0.8–1.2)
LYMPHOCYTES # BLD AUTO: 1.2 K/UL (ref 1–4.8)
LYMPHOCYTES NFR BLD: 16.3 % (ref 18–48)
MCH RBC QN AUTO: 24.5 PG (ref 27–31)
MCHC RBC AUTO-ENTMCNC: 27.5 G/DL (ref 32–36)
MCV RBC AUTO: 89 FL (ref 82–98)
MONOCYTES # BLD AUTO: 0.7 K/UL (ref 0.3–1)
MONOCYTES NFR BLD: 9.7 % (ref 4–15)
NEUTROPHILS # BLD AUTO: 5.2 K/UL (ref 1.8–7.7)
NEUTROPHILS NFR BLD: 73.3 % (ref 38–73)
NRBC BLD-RTO: 0 /100 WBC
PLATELET # BLD AUTO: 188 K/UL (ref 150–450)
PMV BLD AUTO: 9.3 FL (ref 9.2–12.9)
POTASSIUM SERPL-SCNC: 4.1 MMOL/L (ref 3.5–5.1)
POTASSIUM SERPL-SCNC: 4.4 MMOL/L (ref 3.5–5.1)
PROT SERPL-MCNC: 7.7 G/DL (ref 6–8.4)
PROTHROMBIN TIME: 17.1 SEC (ref 9–12.5)
RBC # BLD AUTO: 6.36 M/UL (ref 4.6–6.2)
SODIUM SERPL-SCNC: 140 MMOL/L (ref 136–145)
SODIUM SERPL-SCNC: 140 MMOL/L (ref 136–145)
WBC # BLD AUTO: 7.12 K/UL (ref 3.9–12.7)

## 2024-01-22 PROCEDURE — 25000003 PHARM REV CODE 250: Mod: HCNC

## 2024-01-22 PROCEDURE — 27100171 HC OXYGEN HIGH FLOW UP TO 24 HOURS: Mod: HCNC

## 2024-01-22 PROCEDURE — 99900035 HC TECH TIME PER 15 MIN (STAT): Mod: HCNC

## 2024-01-22 PROCEDURE — 85610 PROTHROMBIN TIME: CPT | Mod: HCNC

## 2024-01-22 PROCEDURE — 94761 N-INVAS EAR/PLS OXIMETRY MLT: CPT | Mod: HCNC

## 2024-01-22 PROCEDURE — 63600175 PHARM REV CODE 636 W HCPCS: Mod: HCNC

## 2024-01-22 PROCEDURE — 80053 COMPREHEN METABOLIC PANEL: CPT | Mod: HCNC

## 2024-01-22 PROCEDURE — 25000003 PHARM REV CODE 250: Mod: HCNC | Performed by: STUDENT IN AN ORGANIZED HEALTH CARE EDUCATION/TRAINING PROGRAM

## 2024-01-22 PROCEDURE — 20600001 HC STEP DOWN PRIVATE ROOM: Mod: HCNC

## 2024-01-22 PROCEDURE — 36415 COLL VENOUS BLD VENIPUNCTURE: CPT | Mod: HCNC

## 2024-01-22 PROCEDURE — 85025 COMPLETE CBC W/AUTO DIFF WBC: CPT | Mod: HCNC

## 2024-01-22 PROCEDURE — 94660 CPAP INITIATION&MGMT: CPT | Mod: HCNC

## 2024-01-22 PROCEDURE — 80048 BASIC METABOLIC PNL TOTAL CA: CPT | Mod: HCNC,XB

## 2024-01-22 PROCEDURE — 27000221 HC OXYGEN, UP TO 24 HOURS: Mod: HCNC

## 2024-01-22 RX ORDER — MAGNESIUM SULFATE HEPTAHYDRATE 40 MG/ML
2 INJECTION, SOLUTION INTRAVENOUS ONCE
Status: COMPLETED | OUTPATIENT
Start: 2024-01-22 | End: 2024-01-22

## 2024-01-22 RX ORDER — POTASSIUM CHLORIDE 20 MEQ/1
40 TABLET, EXTENDED RELEASE ORAL
Status: COMPLETED | OUTPATIENT
Start: 2024-01-22 | End: 2024-01-22

## 2024-01-22 RX ORDER — TALC
6 POWDER (GRAM) TOPICAL NIGHTLY PRN
Status: DISCONTINUED | OUTPATIENT
Start: 2024-01-22 | End: 2024-02-02 | Stop reason: HOSPADM

## 2024-01-22 RX ORDER — POTASSIUM CHLORIDE 7.45 MG/ML
10 INJECTION INTRAVENOUS
Status: DISCONTINUED | OUTPATIENT
Start: 2024-01-22 | End: 2024-01-22

## 2024-01-22 RX ADMIN — POTASSIUM CHLORIDE 40 MEQ: 1500 TABLET, EXTENDED RELEASE ORAL at 02:01

## 2024-01-22 RX ADMIN — MONTELUKAST 10 MG: 10 TABLET, FILM COATED ORAL at 09:01

## 2024-01-22 RX ADMIN — FLUTICASONE FUROATE AND VILANTEROL TRIFENATATE 1 PUFF: 100; 25 POWDER RESPIRATORY (INHALATION) at 07:01

## 2024-01-22 RX ADMIN — POTASSIUM CHLORIDE 40 MEQ: 1500 TABLET, EXTENDED RELEASE ORAL at 03:01

## 2024-01-22 RX ADMIN — PANTOPRAZOLE SODIUM 40 MG: 40 TABLET, DELAYED RELEASE ORAL at 10:01

## 2024-01-22 RX ADMIN — METOPROLOL TARTRATE 25 MG: 25 TABLET, FILM COATED ORAL at 09:01

## 2024-01-22 RX ADMIN — WARFARIN SODIUM 5 MG: 5 TABLET ORAL at 05:01

## 2024-01-22 RX ADMIN — POTASSIUM CHLORIDE 40 MEQ: 1500 TABLET, EXTENDED RELEASE ORAL at 12:01

## 2024-01-22 RX ADMIN — MAGNESIUM SULFATE HEPTAHYDRATE 2 G: 40 INJECTION, SOLUTION INTRAVENOUS at 05:01

## 2024-01-22 RX ADMIN — FUROSEMIDE 120 MG: 10 INJECTION, SOLUTION INTRAVENOUS at 09:01

## 2024-01-22 RX ADMIN — FUROSEMIDE 120 MG: 10 INJECTION, SOLUTION INTRAVENOUS at 03:01

## 2024-01-22 RX ADMIN — FUROSEMIDE 120 MG: 10 INJECTION, SOLUTION INTRAVENOUS at 06:01

## 2024-01-22 RX ADMIN — METOPROLOL TARTRATE 25 MG: 25 TABLET, FILM COATED ORAL at 10:01

## 2024-01-22 RX ADMIN — Medication 6 MG: at 10:01

## 2024-01-22 RX ADMIN — TIOTROPIUM BROMIDE INHALATION SPRAY 2 PUFF: 3.12 SPRAY, METERED RESPIRATORY (INHALATION) at 07:01

## 2024-01-22 NOTE — PROGRESS NOTES
[facility] - Cardiology [facility] Medicine  Progress Note    Patient Name: Yong Mcintyre  MRN: [id number]  Patient Class: IP- Inpatient   Admission Date: 1/18/2024  Length of Stay: 3 days  Attending Physician: Campbell Velarde, *  Primary Care Provider: Gianni Escalona MD        Subjective:     Principal Problem:Acute on chronic heart failure with preserved ejection fraction (HFpEF)        HPI:  Patient is a 47 yo male with a PMH of HFpEF, pickwickian syndrome, HTN, pHTN, Chronic hypoxic respiratory failure (baseline oxygen req at home is 3 L), PFO (unsuccessful closure in 2006), AF (on warfarin for unknown reason) who present with worsening leg swelling and shortness of breath for the past 1 week. He has associated symptoms of orthopnea. He was admitted last month with similar symptoms and was discharged on lasix and metolazone and told to weigh himself daily as he may need dosage adjustment of his diuretics. Patient states he has been compliant with his medications with good urine output but has not been weighing himself. He denies any chest pain, nausea, vomiting, lightheadedness, or dizziness. Interview difficult as patient currently on Bipap.    While in the ED: Patient with oxygen saturation at 79%. Labs reveal worsened BNP >1000. New AARON with Cr 2.7 (up from baseline of 1.5). Initial VBG with pH 7.32 and CO2 83. Initially put on BIPAP but weaned off to high flow nasal cannula. ABG with A total 120 mg of IV Lasix given.      Overview/Hospital Course:  Patient was admitted with fluid overload in the setting of acute on chronic heart failure. He was admitted to the floor on 10 L HFNC, and he was started on 60 mg Lasix IV BID with less than 2L UOP daily. Lasix was later increased to 120 mg IV BID and eventually TID with improved UOP of 2.8 L daily. Continuing to diurese well with IV Lasix, while attempting to wean down oxygen requirements. Overall, net negative 8 L UOP since admission and has been  weaned down to 5 L NC (01/22).    Interval History: NAEON. AFVSS. Patient seen standing up, walking around the room, and he reports feeling well overall with no SOB. He is currently on 5 L NC and satting 93-95%. Continuing to diurese well with Lasix 120 mg TID with UOP in last 24 hrs of 2.12 L ( Negative 8 L since admission). Patient's weight at 251 lbs from 257 yesterday. Repleting potassium as needed.    Review of Systems  Objective:     Vital Signs (Most Recent):  Temp: 97.6 °F (36.4 °C) (01/22/24 1206)  Pulse: 94 (01/22/24 1206)  Resp: 18 (01/22/24 1206)  BP: 118/65 (01/22/24 1206)  SpO2: 98 % (01/22/24 1207) Vital Signs (24h Range):  Temp:  [97.5 °F (36.4 °C)-98.2 °F (36.8 °C)] 97.6 °F (36.4 °C)  Pulse:  [86-97] 94  Resp:  [17-20] 18  SpO2:  [80 %-98 %] 98 %  BP: (103-118)/(58-69) 118/65     Weight: 114.4 kg (252 lb 3.3 oz)  Body mass index is 42.62 kg/m².    Intake/Output Summary (Last 24 hours) at 1/22/2024 1342  Last data filed at 1/22/2024 1039  Gross per 24 hour   Intake 538 ml   Output 2370 ml   Net -1832 ml         Physical Exam  Constitutional:       General: He is not in acute distress.     Appearance: Normal appearance. He is not ill-appearing.   HENT:      Head: Normocephalic and atraumatic.      Nose: Nose normal.      Mouth/Throat:      Mouth: Mucous membranes are moist.   Eyes:      Extraocular Movements: Extraocular movements intact.      Conjunctiva/sclera: Conjunctivae normal.      Pupils: Pupils are equal, round, and reactive to light.   Cardiovascular:      Rate and Rhythm: Normal rate and regular rhythm.      Pulses: Normal pulses.      Heart sounds: Normal heart sounds. No murmur heard.  Pulmonary:      Effort: Pulmonary effort is normal. No respiratory distress.      Breath sounds: Rales present. No wheezing or rhonchi.   Abdominal:      General: Abdomen is flat. Bowel sounds are normal.      Palpations: Abdomen is soft.   Musculoskeletal:         General: Swelling present.      Right  lower leg: Edema present.      Left lower leg: Edema present.   Skin:     General: Skin is warm and dry.      Capillary Refill: Capillary refill takes less than 2 seconds.   Neurological:      Mental Status: He is alert and oriented to person, place, and time.   Psychiatric:         Mood and Affect: Mood normal.         Behavior: Behavior normal.         Thought Content: Thought content normal.         Judgment: Judgment normal.             Significant Labs: All pertinent labs within the past 24 hours have been reviewed.    Significant Imaging: I have reviewed all pertinent imaging results/findings within the past 24 hours.    Assessment/Plan:      * Acute on chronic heart failure with preserved ejection fraction (HFpEF)  Patient is identified as having Diastolic (HFpEF) heart failure that is Acute on chronic. CHF is currently uncontrolled due to Continued edema of extremities, >3 pillow orthopnea, and Rales/crackles on pulmonary exam. CXR with underlying edema.    Echo (01/19)   Left Ventricle: The left ventricle is normal in size. Normal wall thickness. Normal wall motion. Septal flattening in systole consistent with right ventricular pressure overload. There is normal systolic function. Ejection fraction by visual approximation is 55%. There is normal diastolic function.    Right Ventricle: Severe right ventricular enlargement. Wall thickness is normal. Right ventricle wall motion has global hypokinesis. Systolic function is severely reduced.    Tricuspid Valve: There is moderate to severe regurgitation.    Pulmonary Artery: There is moderate to severe pulmonary hypertension. The estimated pulmonary artery systolic pressure is 67 mmHg.    IVC/SVC: Intermediate venous pressure at 8 mmHg.    Pericardium: There is a trivial effusion.    Recent Labs   Lab 01/18/24  2216   BNP 1,073*       -Patient given 120mg IV lasix in ED  -Increased Lasix to 120 mg IV TID  -Consider restarting metolazone if urine output still not  to goal  -Continue Beta Blocker, rest of GDMT is precluded by AARON  -Can consider Bidil     -Monitor strict Is&Os and daily weights.    -Place on fluid restriction of 1.5 L.   -Low salt diet     Chronic right-sided heart failure  Repeat Echo showing severe right ventricular enlargement, normal wall thickness, global hypokinesis of right ventricular wall, and systolic function is severely reduced.       Epigastric abdominal pain  Described as bloating.  -Has follow up with GI on February 2nd  - Patient reports improvement following diuresis; likely related to gut edema with CHF exacerbation      Chronic asthmatic bronchitis  - Continue home inhaler alternatives       Acute kidney injury superimposed on CKD  Patient with acute kidney injury/acute renal failure likely due to pre-renal azotemia due to IVVD AARON is currently improving. Baseline creatinine  1.3  - Labs reviewed- Renal function/electrolytes with Estimated Creatinine Clearance: 58.2 mL/min (A) (based on SCr of 1.8 mg/dL (H)). according to latest data. Monitor urine output and serial BMP and adjust therapy as needed. Avoid nephrotoxins and renally dose meds for GFR listed above.    Acute on chronic respiratory failure with hypoxia and hypercapnia  Patient with Hypercapnic and Hypoxic Respiratory failure which is Acute on chronic.  he is on home oxygen at 3 LPM. CXR with underlying edema.    Likely related to CHF exacerbation. Patient is a chronic CO2 retained with Pickwickian syndrome.    -Continuous oxygen and pulse ox  -BiPAP QHS      MALLIKA (obstructive sleep apnea)  -BiPAP QHS      PFO (patent foramen ovale)  Thought to be cause of patient's structural heart disease.      Pulmonary hypertension  Managed by pulmonology with [facility] with close follow ups  Sildenafil recently discontinued  Remains on 3L NC at baseline  Consulted [facility] due to significant Oxygen requirements and minimal improvement on intense diuresis in the setting of severe pulmonary  HTN.      Pickwickian syndrome  Body mass index is 42.62 kg/m². Morbid obesity complicates all aspects of disease management from diagnostic modalities to treatment. Weight loss encouraged and health benefits explained to patient.     - BiPAP QHS as tolerated         VTE Risk Mitigation (From admission, onward)           Ordered     warfarin (COUMADIN) tablet 5 mg  Daily         01/20/24 2255     IP VTE HIGH RISK PATIENT  Once         01/19/24 0208     Place sequential compression device  Until discontinued         01/19/24 0208                    Discharge Planning   ESTEBAN: 1/25/2024     Code Status: Full Code   Is the patient medically ready for discharge?: No    Reason for patient still in hospital (select all that apply): Patient trending condition and Treatment  Discharge Plan A: Home                  Andre Calvo DO  Department of Hospital Medicine   [facility] - Cardiology Step[facility]

## 2024-01-22 NOTE — SUBJECTIVE & OBJECTIVE
Interval History: NAEON. AFVSS. Patient reports feeling similar to yesterday overall, but he states he feels he is continuing to get fluid off, but not as quick as he would expect while on the increased Lasix. Oxygen requirements weaned down to 8 L O2. UOP yesterday of 2.9 L. Continuing diuresis with 120 mg Lasix IV TID. AARON continuing to improve with diuresis, Cr of 1.8    Review of Systems  Objective:     Vital Signs (Most Recent):  Temp: 97.9 °F (36.6 °C) (01/21/24 1641)  Pulse: 94 (01/21/24 1641)  Resp: 18 (01/21/24 1641)  BP: 103/66 (01/21/24 1641)  SpO2: (!) 92 % (01/21/24 1641) Vital Signs (24h Range):  Temp:  [97.5 °F (36.4 °C)-99 °F (37.2 °C)] 97.9 °F (36.6 °C)  Pulse:  [] 94  Resp:  [14-19] 18  SpO2:  [87 %-95 %] 92 %  BP: (103-118)/(52-77) 103/66     Weight: 114.1 kg (251 lb 8.7 oz)  Body mass index is 42.51 kg/m².    Intake/Output Summary (Last 24 hours) at 1/21/2024 1835  Last data filed at 1/21/2024 1824  Gross per 24 hour   Intake 720 ml   Output 1975 ml   Net -1255 ml         Physical Exam  Constitutional:       General: He is not in acute distress.     Appearance: Normal appearance. He is not ill-appearing.   HENT:      Head: Normocephalic and atraumatic.      Nose: Nose normal.      Mouth/Throat:      Mouth: Mucous membranes are moist.   Eyes:      Extraocular Movements: Extraocular movements intact.      Conjunctiva/sclera: Conjunctivae normal.      Pupils: Pupils are equal, round, and reactive to light.   Cardiovascular:      Rate and Rhythm: Normal rate and regular rhythm.      Pulses: Normal pulses.      Heart sounds: Normal heart sounds. No murmur heard.  Pulmonary:      Effort: Pulmonary effort is normal. No respiratory distress.      Breath sounds: Rales present. No wheezing or rhonchi.   Abdominal:      General: Abdomen is flat. Bowel sounds are normal.      Palpations: Abdomen is soft.   Musculoskeletal:         General: Swelling present.      Right lower leg: Edema present.       Left lower leg: Edema present.   Skin:     General: Skin is warm and dry.      Capillary Refill: Capillary refill takes less than 2 seconds.   Neurological:      Mental Status: He is alert and oriented to person, place, and time.   Psychiatric:         Mood and Affect: Mood normal.         Behavior: Behavior normal.         Thought Content: Thought content normal.         Judgment: Judgment normal.             Significant Labs: All pertinent labs within the past 24 hours have been reviewed.    Significant Imaging: I have reviewed all pertinent imaging results/findings within the past 24 hours.

## 2024-01-22 NOTE — ASSESSMENT & PLAN NOTE
Patient is identified as having Diastolic (HFpEF) heart failure that is Acute on chronic. CHF is currently uncontrolled due to Continued edema of extremities, >3 pillow orthopnea, and Rales/crackles on pulmonary exam. CXR with underlying edema.    Echo (01/19)   Left Ventricle: The left ventricle is normal in size. Normal wall thickness. Normal wall motion. Septal flattening in systole consistent with right ventricular pressure overload. There is normal systolic function. Ejection fraction by visual approximation is 55%. There is normal diastolic function.    Right Ventricle: Severe right ventricular enlargement. Wall thickness is normal. Right ventricle wall motion has global hypokinesis. Systolic function is severely reduced.    Tricuspid Valve: There is moderate to severe regurgitation.    Pulmonary Artery: There is moderate to severe pulmonary hypertension. The estimated pulmonary artery systolic pressure is 67 mmHg.    IVC/SVC: Intermediate venous pressure at 8 mmHg.    Pericardium: There is a trivial effusion.    Recent Labs   Lab 01/18/24  2216   BNP 1,073*       -Patient given 120mg IV lasix in ED  -Increased Lasix to 120 mg IV TID  -Consider restarting metolazone if urine output still not to goal  -Continue Beta Blocker, rest of GDMT is precluded by AARON  -Can consider Bidil     -Monitor strict Is&Os and daily weights.    -Place on fluid restriction of 1.5 L.   -Low salt diet

## 2024-01-22 NOTE — PROGRESS NOTES
[facility] - Cardiology [facility] Medicine  Progress Note    Patient Name: Yong Mcintyre  MRN: [id number]  Patient Class: IP- Inpatient   Admission Date: 1/18/2024  Length of Stay: 2 days  Attending Physician: Campbell Velarde, *  Primary Care Provider: Gianni Escalona MD        Subjective:     Principal Problem:Acute on chronic heart failure with preserved ejection fraction (HFpEF)        HPI:  Patient is a 49 yo male with a PMH of HFpEF, pickwickian syndrome, HTN, pHTN, Chronic hypoxic respiratory failure (baseline oxygen req at home is 3 L), PFO (unsuccessful closure in 2006), AF (on warfarin for unknown reason) who present with worsening leg swelling and shortness of breath for the past 1 week. He has associated symptoms of orthopnea. He was admitted last month with similar symptoms and was discharged on lasix and metolazone and told to weigh himself daily as he may need dosage adjustment of his diuretics. Patient states he has been compliant with his medications with good urine output but has not been weighing himself. He denies any chest pain, nausea, vomiting, lightheadedness, or dizziness. Interview difficult as patient currently on Bipap.    While in the ED: Patient with oxygen saturation at 79%. Labs reveal worsened BNP >1000. New AARON with Cr 2.7 (up from baseline of 1.5). Initial VBG with pH 7.32 and CO2 83. Initially put on BIPAP but weaned off to high flow nasal cannula. ABG with A total 120 mg of IV Lasix given.      Overview/Hospital Course:  Patient was admitted with fluid overload in the setting of acute on chronic heart failure. He was admitted to the floor on 10 L HFNC, and he was started on 60 mg Lasix IV BID with less than 2L UOP daily. Lasix was later increased to 120 mg IV BID and eventually TID with improved UOP of 2.8 L daily. Continuing to diurese well with IV Lasix, while attempting to wean down oxygen requirements.    Interval History: NAEON. AFVSS. Patient reports feeling  similar to yesterday overall, but he states he feels he is continuing to get fluid off, but not as quick as he would expect while on the increased Lasix. Oxygen requirements weaned down to 8 L O2. UOP yesterday of 2.9 L. Continuing diuresis with 120 mg Lasix IV TID. AARON continuing to improve with diuresis, Cr of 1.8    Review of Systems  Objective:     Vital Signs (Most Recent):  Temp: 97.9 °F (36.6 °C) (01/21/24 1641)  Pulse: 94 (01/21/24 1641)  Resp: 18 (01/21/24 1641)  BP: 103/66 (01/21/24 1641)  SpO2: (!) 92 % (01/21/24 1641) Vital Signs (24h Range):  Temp:  [97.5 °F (36.4 °C)-99 °F (37.2 °C)] 97.9 °F (36.6 °C)  Pulse:  [] 94  Resp:  [14-19] 18  SpO2:  [87 %-95 %] 92 %  BP: (103-118)/(52-77) 103/66     Weight: 114.1 kg (251 lb 8.7 oz)  Body mass index is 42.51 kg/m².    Intake/Output Summary (Last 24 hours) at 1/21/2024 1835  Last data filed at 1/21/2024 1824  Gross per 24 hour   Intake 720 ml   Output 1975 ml   Net -1255 ml         Physical Exam  Constitutional:       General: He is not in acute distress.     Appearance: Normal appearance. He is not ill-appearing.   HENT:      Head: Normocephalic and atraumatic.      Nose: Nose normal.      Mouth/Throat:      Mouth: Mucous membranes are moist.   Eyes:      Extraocular Movements: Extraocular movements intact.      Conjunctiva/sclera: Conjunctivae normal.      Pupils: Pupils are equal, round, and reactive to light.   Cardiovascular:      Rate and Rhythm: Normal rate and regular rhythm.      Pulses: Normal pulses.      Heart sounds: Normal heart sounds. No murmur heard.  Pulmonary:      Effort: Pulmonary effort is normal. No respiratory distress.      Breath sounds: Rales present. No wheezing or rhonchi.   Abdominal:      General: Abdomen is flat. Bowel sounds are normal.      Palpations: Abdomen is soft.   Musculoskeletal:         General: Swelling present.      Right lower leg: Edema present.      Left lower leg: Edema present.   Skin:     General: Skin is  warm and dry.      Capillary Refill: Capillary refill takes less than 2 seconds.   Neurological:      Mental Status: He is alert and oriented to person, place, and time.   Psychiatric:         Mood and Affect: Mood normal.         Behavior: Behavior normal.         Thought Content: Thought content normal.         Judgment: Judgment normal.             Significant Labs: All pertinent labs within the past 24 hours have been reviewed.    Significant Imaging: I have reviewed all pertinent imaging results/findings within the past 24 hours.    Assessment/Plan:      * Acute on chronic heart failure with preserved ejection fraction (HFpEF)  Patient is identified as having Diastolic (HFpEF) heart failure that is Acute on chronic. CHF is currently uncontrolled due to Continued edema of extremities, >3 pillow orthopnea, and Rales/crackles on pulmonary exam. CXR with underlying edema.    Echo (01/19)   Left Ventricle: The left ventricle is normal in size. Normal wall thickness. Normal wall motion. Septal flattening in systole consistent with right ventricular pressure overload. There is normal systolic function. Ejection fraction by visual approximation is 55%. There is normal diastolic function.    Right Ventricle: Severe right ventricular enlargement. Wall thickness is normal. Right ventricle wall motion has global hypokinesis. Systolic function is severely reduced.    Tricuspid Valve: There is moderate to severe regurgitation.    Pulmonary Artery: There is moderate to severe pulmonary hypertension. The estimated pulmonary artery systolic pressure is 67 mmHg.    IVC/SVC: Intermediate venous pressure at 8 mmHg.    Pericardium: There is a trivial effusion.    Recent Labs   Lab 01/18/24  2216   BNP 1,073*       -Patient given 120mg IV lasix in ED  -Increased Lasix to 120 mg IV TID  -Consider restarting metolazone if urine output still not to goal  -Continue Beta Blocker, rest of GDMT is precluded by AARON  -Can consider Bidil      -Monitor strict Is&Os and daily weights.    -Place on fluid restriction of 1.5 L.   -Low salt diet   -Cardiology has not been consulted, consider consulting if heart failure remains uncontrolled      Chronic right-sided heart failure  Repeat Echo showing severe right ventricular enlargement, normal wall thickness, global hypokinesis of right ventricular wall, and systolic function is severely reduced.       Epigastric abdominal pain  Described as bloating.  -Has follow up with GI on February 2nd  - Patient reports improvement following diuresis; likely related to gut edema with CHF exacerbation      Chronic asthmatic bronchitis  - Continue home inhaler alternatives       Acute kidney injury superimposed on CKD  Patient with acute kidney injury/acute renal failure likely due to pre-renal azotemia due to IVVD AARON is currently improving. Baseline creatinine  1.3  - Labs reviewed- Renal function/electrolytes with Estimated Creatinine Clearance: 61.6 mL/min (A) (based on SCr of 1.7 mg/dL (H)). according to latest data. Monitor urine output and serial BMP and adjust therapy as needed. Avoid nephrotoxins and renally dose meds for GFR listed above.    Acute on chronic respiratory failure with hypoxia and hypercapnia  Patient with Hypercapnic and Hypoxic Respiratory failure which is Acute on chronic.  he is on home oxygen at 3 LPM. CXR with underlying edema.    Likely related to CHF exacerbation. Patient is a chronic CO2 retained with Pickwickian syndrome.    -Continuous oxygen and pulse ox  -BiPAP QHS      MALLIKA (obstructive sleep apnea)  -BiPAP QHS      PFO (patent foramen ovale)  Thought to be cause of patient's structural heart disease.      Pulmonary hypertension  Managed by pulmonology with [facility] with close follow ups  Sildenafil recently discontinued  Remains on 3L NC at baseline  Consulted [facility] due to significant Oxygen requirements and minimal improvement on intense diuresis in the setting of severe pulmonary  HTN.      Pickwickian syndrome  Body mass index is 43.55 kg/m². Morbid obesity complicates all aspects of disease management from diagnostic modalities to treatment. Weight loss encouraged and health benefits explained to patient.     - BiPAP QHS as tolerated         VTE Risk Mitigation (From admission, onward)           Ordered     warfarin (COUMADIN) tablet 5 mg  Daily         01/20/24 2255     IP VTE HIGH RISK PATIENT  Once         01/19/24 0208     Place sequential compression device  Until discontinued         01/19/24 0208                    Discharge Planning   ESTEBAN: 1/23/2024     Code Status: Full Code   Is the patient medically ready for discharge?: No    Reason for patient still in hospital (select all that apply): Patient unstable, Patient trending condition, and Treatment  Discharge Plan A: Home                  Andre Calvo DO  Department of Hospital Medicine   [facility] - Cardiology [facility]

## 2024-01-22 NOTE — PLAN OF CARE
Problem: Adult Inpatient Plan of Care  Goal: Plan of Care Review  Outcome: Ongoing, Progressing  Goal: Patient-Specific Goal (Individualized)  Outcome: Ongoing, Progressing  Goal: Absence of Hospital-Acquired Illness or Injury  Outcome: Ongoing, Progressing  Goal: Optimal Comfort and Wellbeing  Outcome: Ongoing, Progressing  Goal: Readiness for Transition of Care  Outcome: Ongoing, Progressing     Problem: Bariatric Environmental Safety  Goal: Safety Maintained with Care  Outcome: Ongoing, Progressing     Problem: Impaired Wound Healing  Goal: Optimal Wound Healing  Outcome: Ongoing, Progressing     Problem: Fluid and Electrolyte Imbalance (Acute Kidney Injury/Impairment)  Goal: Fluid and Electrolyte Balance  Outcome: Ongoing, Progressing     Problem: Oral Intake Inadequate (Acute Kidney Injury/Impairment)  Goal: Optimal Nutrition Intake  Outcome: Ongoing, Progressing     Problem: Renal Function Impairment (Acute Kidney Injury/Impairment)  Goal: Effective Renal Function  Outcome: Ongoing, Progressing     Problem: Adjustment to Illness (Heart Failure)  Goal: Optimal Coping  Outcome: Ongoing, Progressing     Problem: Cardiac Output Decreased (Heart Failure)  Goal: Optimal Cardiac Output  Outcome: Ongoing, Progressing     Problem: Dysrhythmia (Heart Failure)  Goal: Stable Heart Rate and Rhythm  Outcome: Ongoing, Progressing     Problem: Fluid Imbalance (Heart Failure)  Goal: Fluid Balance  Outcome: Ongoing, Progressing     Problem: Functional Ability Impaired (Heart Failure)  Goal: Optimal Functional Ability  Outcome: Ongoing, Progressing     Problem: Oral Intake Inadequate (Heart Failure)  Goal: Optimal Nutrition Intake  Outcome: Ongoing, Progressing     Problem: Respiratory Compromise (Heart Failure)  Goal: Effective Oxygenation and Ventilation  Outcome: Ongoing, Progressing     Problem: Sleep Disordered Breathing (Heart Failure)  Goal: Effective Breathing Pattern During Sleep  Outcome: Ongoing, Progressing

## 2024-01-22 NOTE — ASSESSMENT & PLAN NOTE
Patient with acute kidney injury/acute renal failure likely due to pre-renal azotemia due to IVVD AARON is currently improving. Baseline creatinine  1.3  - Labs reviewed- Renal function/electrolytes with Estimated Creatinine Clearance: 61.6 mL/min (A) (based on SCr of 1.7 mg/dL (H)). according to latest data. Monitor urine output and serial BMP and adjust therapy as needed. Avoid nephrotoxins and renally dose meds for GFR listed above.

## 2024-01-22 NOTE — ASSESSMENT & PLAN NOTE
Patient is identified as having Diastolic (HFpEF) heart failure that is Acute on chronic. CHF is currently uncontrolled due to Continued edema of extremities, >3 pillow orthopnea, and Rales/crackles on pulmonary exam. CXR with underlying edema.    Echo (01/19)   Left Ventricle: The left ventricle is normal in size. Normal wall thickness. Normal wall motion. Septal flattening in systole consistent with right ventricular pressure overload. There is normal systolic function. Ejection fraction by visual approximation is 55%. There is normal diastolic function.    Right Ventricle: Severe right ventricular enlargement. Wall thickness is normal. Right ventricle wall motion has global hypokinesis. Systolic function is severely reduced.    Tricuspid Valve: There is moderate to severe regurgitation.    Pulmonary Artery: There is moderate to severe pulmonary hypertension. The estimated pulmonary artery systolic pressure is 67 mmHg.    IVC/SVC: Intermediate venous pressure at 8 mmHg.    Pericardium: There is a trivial effusion.    Recent Labs   Lab 01/18/24  2216   BNP 1,073*       -Patient given 120mg IV lasix in ED  -Increased Lasix to 120 mg IV TID  -Consider restarting metolazone if urine output still not to goal  -Continue Beta Blocker, rest of GDMT is precluded by AARON  -Can consider Bidil     -Monitor strict Is&Os and daily weights.    -Place on fluid restriction of 1.5 L.   -Low salt diet   -Cardiology has not been consulted, consider consulting if heart failure remains uncontrolled

## 2024-01-22 NOTE — RESPIRATORY THERAPY
"RAPID RESPONSE RESPIRATORY THERAPY PROACTIVE ROUNDING NOTE             Time of visit: 0844     Code Status: Full Code   : 1975  Bed: 330/330 A:   MRN: [id number]  Time spent at the bedside: < 15 min    SITUATION    Evaluated patient for: HFNC Compliance     BACKGROUND    Patient has a past medical history of Arthritis, CHF (congestive heart failure), Cor pulmonale, Gallstones, Hypertension, Morbid obesity, Obesity hypoventilation syndrome, On home oxygen therapy, MALLIKA (obstructive sleep apnea), Paroxysmal atrial fibrillation, PFO (patent foramen ovale), Pickwickian syndrome, and Pulmonary hypertension.    24 Hours Vitals Range:  Temp:  [97.5 °F (36.4 °C)-98.2 °F (36.8 °C)]   Pulse:  [86-97]   Resp:  [17-20]   BP: (103-112)/(58-69)   SpO2:  [80 %-97 %]     Labs:    Recent Labs     24  0417 24  1304 24  0655 24  1245 24  0401      < > 141 143 140   K 3.0*   < > 3.1* 3.3* 4.4   CL 86*   < > 86* 86* 88*   CO2 41*   < > 44* 42* 43*   BUN 93*   < > 74* 72* 68*   CREATININE 2.0*   < > 1.8* 1.7* 1.8*   GLU 83   < > 88 124* 86   PHOS 3.5  --  3.1  --   --    MG 1.8  --  1.8  --   --     < > = values in this interval not displayed.        No results for input(s): "PH", "PCO2", "PO2", "HCO3", "POCSATURATED", "BE" in the last 72 hours.    ASSESSMENT/INTERVENTIONS    Last VS   Temp: 98.2 °F (36.8 °C) (852)  Pulse: 97 ( 1138)  Resp: 20 (852)  BP: 112/58 (852)  SpO2: 93 % (852)    Level of Consciousness: Level of Consciousness (AVPU): alert  Respiratory Effort: Respiratory Effort: Normal, Unlabored Expansion/Accessory Muscle Usage: Expansion/Accessory Muscles/Retractions: expansion symmetric, no retractions, no use of accessory muscles  All Lung Field Breath Sounds: All Lung Fields Breath Sounds: Anterior:, Lateral:  O2 Device/Concentration: 6L HFNC  Was the O2 device able to be weaned? Yes  Ambu at bedside:      Active Orders   Respiratory Care    Bipap QHS "     Frequency: QHS     Number of Occurrences: Until Specified     Order Comments: Wean as tolerated       Order Questions:      Mode Bilevel      FiO2% 50      Inspiratory pressure: 18      Expiratory pressure: 12    Oxygen Continuous     Frequency: Continuous     Number of Occurrences: Until Specified     Order Questions:      Device type: High flow      Device: High Flow Nasal Cannula (6 -15 Liters)      LPM: 6-15      Titrate O2 per Oxygen Titration Protocol: Yes      To maintain SpO2 goal of: >= 90%      Notify MD of: Inability to achieve desired SpO2; Sudden change in patient status and requires 20% increase in FiO2; Patient requires >60% FiO2    Pulse Oximetry Continuous     Frequency: Continuous     Number of Occurrences: Until Specified       RECOMMENDATIONS    We recommend: RRT Recs: Continue POC per primary team.      FOLLOW-UP    Please call back the Rapid Response RT, Sue Fernandez, RRT at x [phone] for any questions or concerns.

## 2024-01-22 NOTE — ASSESSMENT & PLAN NOTE
Managed by pulmonology with [facility] with close follow ups  Sildenafil recently discontinued  Remains on 3L NC at baseline  Consulted HTS due to significant Oxygen requirements and minimal improvement on intense diuresis in the setting of severe pulmonary HTN.

## 2024-01-22 NOTE — ASSESSMENT & PLAN NOTE
Body mass index is 42.62 kg/m². Morbid obesity complicates all aspects of disease management from diagnostic modalities to treatment. Weight loss encouraged and health benefits explained to patient.     - BiPAP QHS as tolerated

## 2024-01-22 NOTE — SUBJECTIVE & OBJECTIVE
Interval History: NAEON. AFVSS. Patient seen standing up, walking around the room, and he reports feeling well overall with no SOB. He is currently on 5 L NC and satting 93-95%. Continuing to diurese well with Lasix 120 mg TID with UOP in last 24 hrs of 2.12 L ( Negative 8 L since admission). Patient's weight at 251 lbs from 257 yesterday. Repleting potassium as needed.    Review of Systems  Objective:     Vital Signs (Most Recent):  Temp: 97.6 °F (36.4 °C) (01/22/24 1206)  Pulse: 94 (01/22/24 1206)  Resp: 18 (01/22/24 1206)  BP: 118/65 (01/22/24 1206)  SpO2: 98 % (01/22/24 1207) Vital Signs (24h Range):  Temp:  [97.5 °F (36.4 °C)-98.2 °F (36.8 °C)] 97.6 °F (36.4 °C)  Pulse:  [86-97] 94  Resp:  [17-20] 18  SpO2:  [80 %-98 %] 98 %  BP: (103-118)/(58-69) 118/65     Weight: 114.4 kg (252 lb 3.3 oz)  Body mass index is 42.62 kg/m².    Intake/Output Summary (Last 24 hours) at 1/22/2024 1342  Last data filed at 1/22/2024 1039  Gross per 24 hour   Intake 538 ml   Output 2370 ml   Net -1832 ml         Physical Exam  Constitutional:       General: He is not in acute distress.     Appearance: Normal appearance. He is not ill-appearing.   HENT:      Head: Normocephalic and atraumatic.      Nose: Nose normal.      Mouth/Throat:      Mouth: Mucous membranes are moist.   Eyes:      Extraocular Movements: Extraocular movements intact.      Conjunctiva/sclera: Conjunctivae normal.      Pupils: Pupils are equal, round, and reactive to light.   Cardiovascular:      Rate and Rhythm: Normal rate and regular rhythm.      Pulses: Normal pulses.      Heart sounds: Normal heart sounds. No murmur heard.  Pulmonary:      Effort: Pulmonary effort is normal. No respiratory distress.      Breath sounds: Rales present. No wheezing or rhonchi.   Abdominal:      General: Abdomen is flat. Bowel sounds are normal.      Palpations: Abdomen is soft.   Musculoskeletal:         General: Swelling present.      Right lower leg: Edema present.      Left  lower leg: Edema present.   Skin:     General: Skin is warm and dry.      Capillary Refill: Capillary refill takes less than 2 seconds.   Neurological:      Mental Status: He is alert and oriented to person, place, and time.   Psychiatric:         Mood and Affect: Mood normal.         Behavior: Behavior normal.         Thought Content: Thought content normal.         Judgment: Judgment normal.             Significant Labs: All pertinent labs within the past 24 hours have been reviewed.    Significant Imaging: I have reviewed all pertinent imaging results/findings within the past 24 hours.

## 2024-01-22 NOTE — NURSING
A/Ox4. Patient independent in bed and EOB. Urinal provided. Strict I&O with fluid restriction. Patient compliant on fluid restriction. IV Mg and PO K replaced. Continued lasix IVP. Continues 5L highflow NC and CPAP machine. Patient placed self on and off CPAP when sleeping.

## 2024-01-22 NOTE — ASSESSMENT & PLAN NOTE
Described as bloating.  -Has follow up with GI on February 2nd  - Patient reports improvement following diuresis; likely related to gut edema with CHF exacerbation

## 2024-01-22 NOTE — ASSESSMENT & PLAN NOTE
Patient with acute kidney injury/acute renal failure likely due to pre-renal azotemia due to IVVD AARON is currently improving. Baseline creatinine  1.3  - Labs reviewed- Renal function/electrolytes with Estimated Creatinine Clearance: 58.2 mL/min (A) (based on SCr of 1.8 mg/dL (H)). according to latest data. Monitor urine output and serial BMP and adjust therapy as needed. Avoid nephrotoxins and renally dose meds for GFR listed above.

## 2024-01-23 LAB
ALBUMIN SERPL BCP-MCNC: 2.8 G/DL (ref 3.5–5.2)
ALP SERPL-CCNC: 110 U/L (ref 55–135)
ALT SERPL W/O P-5'-P-CCNC: 14 U/L (ref 10–44)
ANION GAP SERPL CALC-SCNC: 10 MMOL/L (ref 8–16)
ANION GAP SERPL CALC-SCNC: 12 MMOL/L (ref 8–16)
AST SERPL-CCNC: 22 U/L (ref 10–40)
BASOPHILS # BLD AUTO: 0.05 K/UL (ref 0–0.2)
BASOPHILS NFR BLD: 0.7 % (ref 0–1.9)
BILIRUB SERPL-MCNC: 1.9 MG/DL (ref 0.1–1)
BUN SERPL-MCNC: 65 MG/DL (ref 6–20)
BUN SERPL-MCNC: 67 MG/DL (ref 6–20)
CALCIUM SERPL-MCNC: 9.8 MG/DL (ref 8.7–10.5)
CALCIUM SERPL-MCNC: 9.9 MG/DL (ref 8.7–10.5)
CHLORIDE SERPL-SCNC: 88 MMOL/L (ref 95–110)
CHLORIDE SERPL-SCNC: 90 MMOL/L (ref 95–110)
CO2 SERPL-SCNC: 39 MMOL/L (ref 23–29)
CO2 SERPL-SCNC: 41 MMOL/L (ref 23–29)
CREAT SERPL-MCNC: 1.7 MG/DL (ref 0.5–1.4)
CREAT SERPL-MCNC: 1.8 MG/DL (ref 0.5–1.4)
DIFFERENTIAL METHOD BLD: ABNORMAL
EOSINOPHIL # BLD AUTO: 0 K/UL (ref 0–0.5)
EOSINOPHIL NFR BLD: 0 % (ref 0–8)
ERYTHROCYTE [DISTWIDTH] IN BLOOD BY AUTOMATED COUNT: 20.8 % (ref 11.5–14.5)
EST. GFR  (NO RACE VARIABLE): 45.9 ML/MIN/1.73 M^2
EST. GFR  (NO RACE VARIABLE): 49.1 ML/MIN/1.73 M^2
GLUCOSE SERPL-MCNC: 121 MG/DL (ref 70–110)
GLUCOSE SERPL-MCNC: 90 MG/DL (ref 70–110)
HCT VFR BLD AUTO: 56 % (ref 40–54)
HGB BLD-MCNC: 15.5 G/DL (ref 14–18)
IMM GRANULOCYTES # BLD AUTO: 0.01 K/UL (ref 0–0.04)
IMM GRANULOCYTES NFR BLD AUTO: 0.1 % (ref 0–0.5)
INR PPP: 1.5 (ref 0.8–1.2)
LYMPHOCYTES # BLD AUTO: 1.4 K/UL (ref 1–4.8)
LYMPHOCYTES NFR BLD: 19.3 % (ref 18–48)
MAGNESIUM SERPL-MCNC: 1.9 MG/DL (ref 1.6–2.6)
MCH RBC QN AUTO: 24.7 PG (ref 27–31)
MCHC RBC AUTO-ENTMCNC: 27.7 G/DL (ref 32–36)
MCV RBC AUTO: 89 FL (ref 82–98)
MONOCYTES # BLD AUTO: 0.7 K/UL (ref 0.3–1)
MONOCYTES NFR BLD: 9.6 % (ref 4–15)
NEUTROPHILS # BLD AUTO: 5 K/UL (ref 1.8–7.7)
NEUTROPHILS NFR BLD: 70.3 % (ref 38–73)
NRBC BLD-RTO: 0 /100 WBC
PHOSPHATE SERPL-MCNC: 3.1 MG/DL (ref 2.7–4.5)
PLATELET # BLD AUTO: 205 K/UL (ref 150–450)
PMV BLD AUTO: 9.8 FL (ref 9.2–12.9)
POTASSIUM SERPL-SCNC: 4.1 MMOL/L (ref 3.5–5.1)
POTASSIUM SERPL-SCNC: 4.2 MMOL/L (ref 3.5–5.1)
PROT SERPL-MCNC: 7.8 G/DL (ref 6–8.4)
PROTHROMBIN TIME: 16.2 SEC (ref 9–12.5)
RBC # BLD AUTO: 6.28 M/UL (ref 4.6–6.2)
SODIUM SERPL-SCNC: 139 MMOL/L (ref 136–145)
SODIUM SERPL-SCNC: 141 MMOL/L (ref 136–145)
WBC # BLD AUTO: 7.09 K/UL (ref 3.9–12.7)

## 2024-01-23 PROCEDURE — 85610 PROTHROMBIN TIME: CPT | Mod: HCNC

## 2024-01-23 PROCEDURE — 99900035 HC TECH TIME PER 15 MIN (STAT): Mod: HCNC

## 2024-01-23 PROCEDURE — 20600001 HC STEP DOWN PRIVATE ROOM: Mod: HCNC

## 2024-01-23 PROCEDURE — 63600175 PHARM REV CODE 636 W HCPCS: Mod: HCNC

## 2024-01-23 PROCEDURE — 85025 COMPLETE CBC W/AUTO DIFF WBC: CPT | Mod: HCNC

## 2024-01-23 PROCEDURE — 25000003 PHARM REV CODE 250: Mod: HCNC | Performed by: STUDENT IN AN ORGANIZED HEALTH CARE EDUCATION/TRAINING PROGRAM

## 2024-01-23 PROCEDURE — 36415 COLL VENOUS BLD VENIPUNCTURE: CPT | Mod: HCNC

## 2024-01-23 PROCEDURE — 80053 COMPREHEN METABOLIC PANEL: CPT | Mod: HCNC

## 2024-01-23 PROCEDURE — 83735 ASSAY OF MAGNESIUM: CPT | Mod: HCNC

## 2024-01-23 PROCEDURE — 94640 AIRWAY INHALATION TREATMENT: CPT | Mod: HCNC

## 2024-01-23 PROCEDURE — 27000221 HC OXYGEN, UP TO 24 HOURS: Mod: HCNC

## 2024-01-23 PROCEDURE — 84100 ASSAY OF PHOSPHORUS: CPT | Mod: HCNC

## 2024-01-23 PROCEDURE — 80048 BASIC METABOLIC PNL TOTAL CA: CPT | Mod: HCNC,XB

## 2024-01-23 PROCEDURE — 94761 N-INVAS EAR/PLS OXIMETRY MLT: CPT | Mod: HCNC

## 2024-01-23 PROCEDURE — 25000003 PHARM REV CODE 250: Mod: HCNC

## 2024-01-23 PROCEDURE — 27100171 HC OXYGEN HIGH FLOW UP TO 24 HOURS: Mod: HCNC

## 2024-01-23 RX ORDER — FUROSEMIDE 10 MG/ML
120 INJECTION INTRAMUSCULAR; INTRAVENOUS ONCE
Status: COMPLETED | OUTPATIENT
Start: 2024-01-23 | End: 2024-01-23

## 2024-01-23 RX ORDER — METOLAZONE 5 MG/1
10 TABLET ORAL DAILY
Status: DISCONTINUED | OUTPATIENT
Start: 2024-01-23 | End: 2024-01-30

## 2024-01-23 RX ORDER — METOLAZONE 5 MG/1
5 TABLET ORAL ONCE
Status: COMPLETED | OUTPATIENT
Start: 2024-01-23 | End: 2024-01-23

## 2024-01-23 RX ADMIN — FLUTICASONE FUROATE AND VILANTEROL TRIFENATATE 1 PUFF: 100; 25 POWDER RESPIRATORY (INHALATION) at 10:01

## 2024-01-23 RX ADMIN — FUROSEMIDE 120 MG: 10 INJECTION, SOLUTION INTRAVENOUS at 01:01

## 2024-01-23 RX ADMIN — FUROSEMIDE 120 MG: 10 INJECTION, SOLUTION INTRAVENOUS at 05:01

## 2024-01-23 RX ADMIN — METOPROLOL TARTRATE 25 MG: 25 TABLET, FILM COATED ORAL at 08:01

## 2024-01-23 RX ADMIN — PANTOPRAZOLE SODIUM 40 MG: 40 TABLET, DELAYED RELEASE ORAL at 08:01

## 2024-01-23 RX ADMIN — METOLAZONE 10 MG: 5 TABLET ORAL at 05:01

## 2024-01-23 RX ADMIN — FUROSEMIDE 20 MG/HR: 10 INJECTION, SOLUTION INTRAMUSCULAR; INTRAVENOUS at 05:01

## 2024-01-23 RX ADMIN — WARFARIN SODIUM 5 MG: 5 TABLET ORAL at 05:01

## 2024-01-23 RX ADMIN — METOLAZONE 5 MG: 5 TABLET ORAL at 01:01

## 2024-01-23 RX ADMIN — TIOTROPIUM BROMIDE INHALATION SPRAY 2 PUFF: 3.12 SPRAY, METERED RESPIRATORY (INHALATION) at 10:01

## 2024-01-23 RX ADMIN — MONTELUKAST 10 MG: 10 TABLET, FILM COATED ORAL at 08:01

## 2024-01-23 NOTE — PLAN OF CARE
Problem: Adult Inpatient Plan of Care  Goal: Plan of Care Review  Outcome: Ongoing, Progressing  Goal: Patient-Specific Goal (Individualized)  Outcome: Ongoing, Progressing  Goal: Absence of Hospital-Acquired Illness or Injury  Outcome: Ongoing, Progressing  Goal: Optimal Comfort and Wellbeing  Outcome: Ongoing, Progressing  Goal: Readiness for Transition of Care  Outcome: Ongoing, Progressing     Problem: Fluid and Electrolyte Imbalance (Acute Kidney Injury/Impairment)  Goal: Fluid and Electrolyte Balance  Outcome: Ongoing, Progressing     Problem: Renal Function Impairment (Acute Kidney Injury/Impairment)  Goal: Effective Renal Function  Outcome: Ongoing, Progressing     Problem: Adjustment to Illness (Heart Failure)  Goal: Optimal Coping  Outcome: Ongoing, Progressing     Problem: Fluid Imbalance (Heart Failure)  Goal: Fluid Balance  Outcome: Ongoing, Progressing     Problem: Oral Intake Inadequate (Heart Failure)  Goal: Optimal Nutrition Intake  Outcome: Ongoing, Progressing     Problem: Fall Injury Risk  Goal: Absence of Fall and Fall-Related Injury  Outcome: Ongoing, Progressing

## 2024-01-23 NOTE — RESPIRATORY THERAPY
RAPID RESPONSE RESPIRATORY CHART CHECK       Chart check completed.  Please call [phone] for further concerns or assistance.

## 2024-01-23 NOTE — PLAN OF CARE
[facility] - Cardiology Stepdown  Discharge Reassessment    Primary Care Provider: Gianni Escalona MD    Expected Discharge Date: 1/25/2024    Reassessment (most recent)       Discharge Reassessment - 01/23/24 1603          Discharge Reassessment    Assessment Type Discharge Planning Reassessment     Did the patient's condition or plan change since previous assessment? No     Discharge Plan discussed with: Patient     Communicated ESTEBAN with patient/caregiver Date not available/Unable to determine     Discharge Plan A Home     Discharge Plan B Home with family     DME Needed Upon Discharge  other (see comments)   TBD    Why the patient remains in the hospital Requires continued medical care                   CM went to room to discuss patient choice for home health . Patient declined home health . Aware he is on coumadin and asked him who does his lab work and he stated he drives himself to get his blood work and did not feel at this time he needs home health. Stated he is young but knows there may be a day that he will not be able to breathe . He is grateful for the team here helping get the fluids off him. Again denied need for home health.     Damion Jarvis RN    [phone]

## 2024-01-24 LAB
ALBUMIN SERPL BCP-MCNC: 2.7 G/DL (ref 3.5–5.2)
ALP SERPL-CCNC: 105 U/L (ref 55–135)
ALT SERPL W/O P-5'-P-CCNC: 12 U/L (ref 10–44)
ANION GAP SERPL CALC-SCNC: 13 MMOL/L (ref 8–16)
ANION GAP SERPL CALC-SCNC: 13 MMOL/L (ref 8–16)
AST SERPL-CCNC: 20 U/L (ref 10–40)
BASOPHILS # BLD AUTO: 0.05 K/UL (ref 0–0.2)
BASOPHILS NFR BLD: 0.8 % (ref 0–1.9)
BILIRUB SERPL-MCNC: 1.5 MG/DL (ref 0.1–1)
BUN SERPL-MCNC: 67 MG/DL (ref 6–20)
BUN SERPL-MCNC: 69 MG/DL (ref 6–20)
CALCIUM SERPL-MCNC: 9.8 MG/DL (ref 8.7–10.5)
CALCIUM SERPL-MCNC: 9.9 MG/DL (ref 8.7–10.5)
CHLORIDE SERPL-SCNC: 86 MMOL/L (ref 95–110)
CHLORIDE SERPL-SCNC: 87 MMOL/L (ref 95–110)
CO2 SERPL-SCNC: 39 MMOL/L (ref 23–29)
CO2 SERPL-SCNC: 41 MMOL/L (ref 23–29)
CREAT SERPL-MCNC: 2 MG/DL (ref 0.5–1.4)
CREAT SERPL-MCNC: 2 MG/DL (ref 0.5–1.4)
DIFFERENTIAL METHOD BLD: ABNORMAL
EOSINOPHIL # BLD AUTO: 0 K/UL (ref 0–0.5)
EOSINOPHIL NFR BLD: 0 % (ref 0–8)
ERYTHROCYTE [DISTWIDTH] IN BLOOD BY AUTOMATED COUNT: 21.3 % (ref 11.5–14.5)
EST. GFR  (NO RACE VARIABLE): 40.4 ML/MIN/1.73 M^2
EST. GFR  (NO RACE VARIABLE): 40.4 ML/MIN/1.73 M^2
GLUCOSE SERPL-MCNC: 104 MG/DL (ref 70–110)
GLUCOSE SERPL-MCNC: 114 MG/DL (ref 70–110)
HCT VFR BLD AUTO: 55.3 % (ref 40–54)
HGB BLD-MCNC: 15.4 G/DL (ref 14–18)
IMM GRANULOCYTES # BLD AUTO: 0.02 K/UL (ref 0–0.04)
IMM GRANULOCYTES NFR BLD AUTO: 0.3 % (ref 0–0.5)
INR PPP: 1.7 (ref 0.8–1.2)
LYMPHOCYTES # BLD AUTO: 1 K/UL (ref 1–4.8)
LYMPHOCYTES NFR BLD: 16.4 % (ref 18–48)
MAGNESIUM SERPL-MCNC: 1.5 MG/DL (ref 1.6–2.6)
MCH RBC QN AUTO: 24.8 PG (ref 27–31)
MCHC RBC AUTO-ENTMCNC: 27.8 G/DL (ref 32–36)
MCV RBC AUTO: 89 FL (ref 82–98)
MONOCYTES # BLD AUTO: 0.6 K/UL (ref 0.3–1)
MONOCYTES NFR BLD: 9 % (ref 4–15)
NEUTROPHILS # BLD AUTO: 4.7 K/UL (ref 1.8–7.7)
NEUTROPHILS NFR BLD: 73.5 % (ref 38–73)
NRBC BLD-RTO: 0 /100 WBC
PHOSPHATE SERPL-MCNC: 3.9 MG/DL (ref 2.7–4.5)
PLATELET # BLD AUTO: 172 K/UL (ref 150–450)
PMV BLD AUTO: 9.8 FL (ref 9.2–12.9)
POTASSIUM SERPL-SCNC: 3.4 MMOL/L (ref 3.5–5.1)
POTASSIUM SERPL-SCNC: 4.2 MMOL/L (ref 3.5–5.1)
PROT SERPL-MCNC: 7.5 G/DL (ref 6–8.4)
PROTHROMBIN TIME: 17.4 SEC (ref 9–12.5)
RBC # BLD AUTO: 6.21 M/UL (ref 4.6–6.2)
SODIUM SERPL-SCNC: 139 MMOL/L (ref 136–145)
SODIUM SERPL-SCNC: 140 MMOL/L (ref 136–145)
WBC # BLD AUTO: 6.35 K/UL (ref 3.9–12.7)

## 2024-01-24 PROCEDURE — 25000003 PHARM REV CODE 250: Mod: HCNC

## 2024-01-24 PROCEDURE — 84100 ASSAY OF PHOSPHORUS: CPT | Mod: HCNC

## 2024-01-24 PROCEDURE — 99900035 HC TECH TIME PER 15 MIN (STAT): Mod: HCNC

## 2024-01-24 PROCEDURE — 80053 COMPREHEN METABOLIC PANEL: CPT | Mod: HCNC

## 2024-01-24 PROCEDURE — 85025 COMPLETE CBC W/AUTO DIFF WBC: CPT | Mod: HCNC

## 2024-01-24 PROCEDURE — 94761 N-INVAS EAR/PLS OXIMETRY MLT: CPT | Mod: HCNC

## 2024-01-24 PROCEDURE — 63600175 PHARM REV CODE 636 W HCPCS: Mod: HCNC

## 2024-01-24 PROCEDURE — 25000003 PHARM REV CODE 250: Mod: HCNC | Performed by: STUDENT IN AN ORGANIZED HEALTH CARE EDUCATION/TRAINING PROGRAM

## 2024-01-24 PROCEDURE — 94660 CPAP INITIATION&MGMT: CPT | Mod: HCNC

## 2024-01-24 PROCEDURE — 36415 COLL VENOUS BLD VENIPUNCTURE: CPT | Mod: HCNC

## 2024-01-24 PROCEDURE — 80048 BASIC METABOLIC PNL TOTAL CA: CPT | Mod: HCNC,XB

## 2024-01-24 PROCEDURE — 83735 ASSAY OF MAGNESIUM: CPT | Mod: HCNC

## 2024-01-24 PROCEDURE — 85610 PROTHROMBIN TIME: CPT | Mod: HCNC

## 2024-01-24 PROCEDURE — 27100171 HC OXYGEN HIGH FLOW UP TO 24 HOURS: Mod: HCNC

## 2024-01-24 PROCEDURE — 20600001 HC STEP DOWN PRIVATE ROOM: Mod: HCNC

## 2024-01-24 RX ORDER — MAGNESIUM SULFATE HEPTAHYDRATE 40 MG/ML
2 INJECTION, SOLUTION INTRAVENOUS ONCE
Status: COMPLETED | OUTPATIENT
Start: 2024-01-24 | End: 2024-01-24

## 2024-01-24 RX ORDER — POTASSIUM CHLORIDE 20 MEQ/1
40 TABLET, EXTENDED RELEASE ORAL
Status: COMPLETED | OUTPATIENT
Start: 2024-01-24 | End: 2024-01-24

## 2024-01-24 RX ADMIN — WARFARIN SODIUM 5 MG: 5 TABLET ORAL at 05:01

## 2024-01-24 RX ADMIN — FUROSEMIDE 20 MG/HR: 10 INJECTION, SOLUTION INTRAMUSCULAR; INTRAVENOUS at 06:01

## 2024-01-24 RX ADMIN — MAGNESIUM SULFATE HEPTAHYDRATE 2 G: 40 INJECTION, SOLUTION INTRAVENOUS at 04:01

## 2024-01-24 RX ADMIN — METOPROLOL TARTRATE 25 MG: 25 TABLET, FILM COATED ORAL at 09:01

## 2024-01-24 RX ADMIN — POTASSIUM CHLORIDE 40 MEQ: 1500 TABLET, EXTENDED RELEASE ORAL at 12:01

## 2024-01-24 RX ADMIN — POTASSIUM CHLORIDE 40 MEQ: 1500 TABLET, EXTENDED RELEASE ORAL at 10:01

## 2024-01-24 RX ADMIN — METOPROLOL TARTRATE 25 MG: 25 TABLET, FILM COATED ORAL at 08:01

## 2024-01-24 RX ADMIN — METOLAZONE 10 MG: 5 TABLET ORAL at 08:01

## 2024-01-24 RX ADMIN — MONTELUKAST 10 MG: 10 TABLET, FILM COATED ORAL at 09:01

## 2024-01-24 RX ADMIN — PANTOPRAZOLE SODIUM 40 MG: 40 TABLET, DELAYED RELEASE ORAL at 08:01

## 2024-01-24 NOTE — ASSESSMENT & PLAN NOTE
Described as bloating.  -Has follow up with GI on February 2nd  -Patient reports improvement following diuresis; likely related to gut edema with CHF exacerbation

## 2024-01-24 NOTE — SUBJECTIVE & OBJECTIVE
Interval History: NAEON. AFVSS. Patient reports concern that he has not been urinating as frequently as prior. This is congruent with his UOP which has tapered off to 1.6L in past 24hrs, net -466cc. Gave metolazone 5mg prior to mid day lasix dose but with still poor UOP. Transitioned patient to lasix gtt at 20/hr and additional metolazone 10mg daily.     Review of Systems   Constitutional:  Negative for appetite change, chills, diaphoresis, fatigue and fever.   Eyes:  Negative for visual disturbance.   Respiratory:  Positive for shortness of breath. Negative for cough, chest tightness and wheezing.    Cardiovascular:  Positive for leg swelling. Negative for chest pain and palpitations.   Gastrointestinal:  Positive for abdominal pain (bloating). Negative for constipation, diarrhea, nausea and vomiting.   Genitourinary:  Negative for dysuria, frequency and urgency.   Neurological:  Negative for dizziness, facial asymmetry and light-headedness.     Objective:     Vital Signs (Most Recent):  Temp: 98.2 °F (36.8 °C) (01/23/24 1603)  Pulse: 94 (01/23/24 1603)  Resp: 20 (01/23/24 1603)  BP: 108/70 (01/23/24 1603)  SpO2: (!) 94 % (01/23/24 1603) Vital Signs (24h Range):  Temp:  [97.7 °F (36.5 °C)-98.5 °F (36.9 °C)] 98.2 °F (36.8 °C)  Pulse:  [] 94  Resp:  [12-22] 20  SpO2:  [90 %-100 %] 94 %  BP: ()/(54-70) 108/70     Weight: 114.4 kg (252 lb 3.3 oz)  Body mass index is 42.62 kg/m².    Intake/Output Summary (Last 24 hours) at 1/23/2024 1827  Last data filed at 1/23/2024 1707  Gross per 24 hour   Intake 942 ml   Output 1500 ml   Net -558 ml         Physical Exam  Constitutional:       General: He is not in acute distress.     Appearance: Normal appearance. He is not ill-appearing.   HENT:      Head: Normocephalic and atraumatic.      Nose: Nose normal.      Mouth/Throat:      Mouth: Mucous membranes are moist.   Eyes:      Extraocular Movements: Extraocular movements intact.      Conjunctiva/sclera: Conjunctivae  normal.      Pupils: Pupils are equal, round, and reactive to light.   Cardiovascular:      Rate and Rhythm: Normal rate and regular rhythm.      Pulses: Normal pulses.      Heart sounds: Normal heart sounds. No murmur heard.  Pulmonary:      Effort: Pulmonary effort is normal. No respiratory distress.      Breath sounds: Rales present. No wheezing or rhonchi.      Comments: Currently on 6L NC  Abdominal:      General: Abdomen is flat. Bowel sounds are normal.      Palpations: Abdomen is soft.   Musculoskeletal:         General: Swelling present.      Right lower leg: Edema present.      Left lower leg: Edema present.   Skin:     General: Skin is warm and dry.      Capillary Refill: Capillary refill takes less than 2 seconds.   Neurological:      Mental Status: He is alert and oriented to person, place, and time.   Psychiatric:         Mood and Affect: Mood normal.         Behavior: Behavior normal.         Thought Content: Thought content normal.         Judgment: Judgment normal.             Significant Labs: All pertinent labs within the past 24 hours have been reviewed.  CBC:   Recent Labs   Lab 01/22/24  0401 01/23/24  0608   WBC 7.12 7.09   HGB 15.6 15.5   HCT 56.8* 56.0*    205     CMP:   Recent Labs   Lab 01/22/24  0401 01/22/24  1304 01/23/24  0608 01/23/24  1254    140 141 139   K 4.4 4.1 4.1 4.2   CL 88* 89* 90* 88*   CO2 43* 41* 39* 41*   GLU 86 117* 90 121*   BUN 68* 64* 65* 67*   CREATININE 1.8* 1.7* 1.7* 1.8*   CALCIUM 9.9 9.6 9.8 9.9   PROT 7.7  --  7.8  --    ALBUMIN 2.8*  --  2.8*  --    BILITOT 2.3*  --  1.9*  --    ALKPHOS 102  --  110  --    AST 23  --  22  --    ALT 12  --  14  --    ANIONGAP 9 10 12 10       Significant Imaging: I have reviewed all pertinent imaging results/findings within the past 24 hours.

## 2024-01-24 NOTE — ASSESSMENT & PLAN NOTE
Patient with acute kidney injury/acute renal failure likely due to pre-renal azotemia due to IVVD AARON is currently improving. Baseline creatinine  1.2-1.5  - Labs reviewed- Renal function/electrolytes with Estimated Creatinine Clearance: 51.7 mL/min (A) (based on SCr of 2 mg/dL (H)). according to latest data. Monitor urine output and serial BMP and adjust therapy as needed. Avoid nephrotoxins and renally dose meds for GFR listed above.

## 2024-01-24 NOTE — PROGRESS NOTES
[facility] - Cardiology [facility] Medicine  Progress Note    Patient Name: Yong Mcintyre  MRN: [id number]  Patient Class: IP- Inpatient   Admission Date: 1/18/2024  Length of Stay: 5 days  Attending Physician: Campbell Velarde, *  Primary Care Provider: Gianni Escalona MD        Subjective:     Principal Problem:Acute on chronic heart failure with preserved ejection fraction (HFpEF)        HPI:  Patient is a 47 yo male with a PMH of HFpEF, pickwickian syndrome, HTN, pHTN, Chronic hypoxic respiratory failure (baseline oxygen req at home is 3 L), PFO (unsuccessful closure in 2006), AF (on warfarin for unknown reason) who present with worsening leg swelling and shortness of breath for the past 1 week. He has associated symptoms of orthopnea. He was admitted last month with similar symptoms and was discharged on lasix and metolazone and told to weigh himself daily as he may need dosage adjustment of his diuretics. Patient states he has been compliant with his medications with good urine output but has not been weighing himself. He denies any chest pain, nausea, vomiting, lightheadedness, or dizziness. Interview difficult as patient currently on Bipap.    While in the ED: Patient with oxygen saturation at 79%. Labs reveal worsened BNP >1000. New AARON with Cr 2.7 (up from baseline of 1.5). Initial VBG with pH 7.32 and CO2 83. Initially put on BIPAP but weaned off to high flow nasal cannula. ABG with A total 120 mg of IV Lasix given.      Overview/Hospital Course:  Patient was admitted to  with AHRF in setting of decompensated heart failure. Patient was initially diuresing well with 120mg TID IVP lasix and he was weaned down from 11L NC (on arrival) to 5-6L NC, however UOP started gradually decreasing. Now on lasix drip at 20 mg/hr and daily Metolazone 10mg. May need  gtt if he continues to have difficulty diuresing.       Interval History: NAEON. AFVSS. Patients reports feeling well overall, but he  does state that he is urinating more frequently since starting the Lasix drip and adding Metolazone yesterday. He is still maintaining adequate O2 saturation on 6L NC. Net negative UOP of 1.97 L yesterday. Daily weight today of 111.6 kg. Slight worsening of kidney function with Cr 2.0 from 1.8 yesterday, thought to be due to lag in diuresis. Potassium and Magnesium repleted.     Review of Systems  Objective:     Vital Signs (Most Recent):  Temp: 98.4 °F (36.9 °C) (01/24/24 1114)  Pulse: 92 (01/24/24 1204)  Resp: 19 (01/24/24 1114)  BP: 104/64 (01/24/24 1114)  SpO2: 95 % (01/24/24 1114) Vital Signs (24h Range):  Temp:  [97.5 °F (36.4 °C)-98.4 °F (36.9 °C)] 98.4 °F (36.9 °C)  Pulse:  [] 92  Resp:  [18-21] 19  SpO2:  [21 %-96 %] 95 %  BP: ()/(59-70) 104/64     Weight: 111.6 kg (246 lb 0.5 oz)  Body mass index is 41.58 kg/m².    Intake/Output Summary (Last 24 hours) at 1/24/2024 1435  Last data filed at 1/24/2024 0853  Gross per 24 hour   Intake 1322 ml   Output 3275 ml   Net -1953 ml         Physical Exam  Constitutional:       General: He is not in acute distress.     Appearance: Normal appearance. He is not ill-appearing.   HENT:      Head: Normocephalic and atraumatic.      Nose: Nose normal.      Mouth/Throat:      Mouth: Mucous membranes are moist.   Eyes:      Extraocular Movements: Extraocular movements intact.      Conjunctiva/sclera: Conjunctivae normal.      Pupils: Pupils are equal, round, and reactive to light.   Cardiovascular:      Rate and Rhythm: Normal rate and regular rhythm.      Pulses: Normal pulses.      Heart sounds: Normal heart sounds. No murmur heard.  Pulmonary:      Effort: Pulmonary effort is normal. No respiratory distress.      Breath sounds: Rales present. No wheezing or rhonchi.      Comments: Currently on 6L NC; crackles to bilateral lung bases  Abdominal:      General: Abdomen is flat. Bowel sounds are normal.      Palpations: Abdomen is soft.   Musculoskeletal:          General: Swelling present.      Right lower leg: Edema present.      Left lower leg: Edema present.   Skin:     General: Skin is warm and dry.      Capillary Refill: Capillary refill takes less than 2 seconds.   Neurological:      Mental Status: He is alert and oriented to person, place, and time.   Psychiatric:         Mood and Affect: Mood normal.         Behavior: Behavior normal.         Thought Content: Thought content normal.         Judgment: Judgment normal.             Significant Labs: All pertinent labs within the past 24 hours have been reviewed.    Significant Imaging: I have reviewed all pertinent imaging results/findings within the past 24 hours.    Assessment/Plan:      * Acute on chronic heart failure with preserved ejection fraction (HFpEF)  Patient is identified as having Diastolic (HFpEF) heart failure that is Acute on chronic. CHF is currently uncontrolled due to Continued edema of extremities, >3 pillow orthopnea, and Rales/crackles on pulmonary exam. CXR with underlying edema.    Echo (01/19)   Left Ventricle: The left ventricle is normal in size. Normal wall thickness. Normal wall motion. Septal flattening in systole consistent with right ventricular pressure overload. There is normal systolic function. Ejection fraction by visual approximation is 55%. There is normal diastolic function.    Right Ventricle: Severe right ventricular enlargement. Wall thickness is normal. Right ventricle wall motion has global hypokinesis. Systolic function is severely reduced.    Tricuspid Valve: There is moderate to severe regurgitation.    Pulmonary Artery: There is moderate to severe pulmonary hypertension. The estimated pulmonary artery systolic pressure is 67 mmHg.    IVC/SVC: Intermediate venous pressure at 8 mmHg.    Pericardium: There is a trivial effusion.    Recent Labs   Lab 01/18/24  2216   BNP 1,073*       -Initially on Lasix 120 mg IVP TID and diuresing well, however now with gradually decreasing  UOP. Transitioned to Lasix gtt at 20/hr and metolazone 10mg q.d.  -Continue Beta Blocker, rest of GDMT is precluded by AARON    -Monitor strict Is&Os and daily weights.    -Place on fluid restriction of 1.5 L.   -Low salt diet     Chronic right-sided heart failure  Repeat Echo showing severe right ventricular enlargement, normal wall thickness, global hypokinesis of right ventricular wall, and systolic function is severely reduced.       Epigastric abdominal pain  Described as bloating.  -Has follow up with GI on February 2nd  -Patient reports improvement following diuresis; likely related to gut edema with CHF exacerbation      Chronic asthmatic bronchitis  - Continue home inhaler alternatives       Acute kidney injury superimposed on CKD  Patient with acute kidney injury/acute renal failure likely due to pre-renal azotemia due to IVVD AARON is currently improving. Baseline creatinine  1.2-1.5  - Labs reviewed- Renal function/electrolytes with Estimated Creatinine Clearance: 51.7 mL/min (A) (based on SCr of 2 mg/dL (H)). according to latest data. Monitor urine output and serial BMP and adjust therapy as needed. Avoid nephrotoxins and renally dose meds for GFR listed above.    Acute on chronic respiratory failure with hypoxia and hypercapnia  Patient with Hypercapnic and Hypoxic Respiratory failure which is Acute on chronic.  he is on home oxygen at 3 LPM. CXR with underlying edema.    Likely related to CHF exacerbation. Patient is a chronic CO2 retainer with Pickwickian syndrome.    -Diuresis  -Continuous oxygen and pulse ox  -BiPAP QHS      MALLIKA (obstructive sleep apnea)  -BiPAP QHS      PFO (patent foramen ovale)  Thought to be cause of patient's structural heart disease.      Pulmonary hypertension  Managed by pulmonology with [facility] with close follow ups  Sildenafil recently discontinued  Uses 3L NC at baseline  Continue Warfarin. Daily PT/INR  May need to consult HTS given increased  Oxygen requirements and minimal  improvement on intense diuresis in the setting of severe pulmonary HTN.      Pickwickian syndrome  Body mass index is 41.58 kg/m². Morbid obesity complicates all aspects of disease management from diagnostic modalities to treatment. Weight loss encouraged and health benefits explained to patient.     - BiPAP QHS as tolerated         VTE Risk Mitigation (From admission, onward)           Ordered     warfarin (COUMADIN) tablet 5 mg  Daily         01/20/24 2255     IP VTE HIGH RISK PATIENT  Once         01/19/24 0208     Place sequential compression device  Until discontinued         01/19/24 0208                    Discharge Planning   ESTEBAN: 1/25/2024     Code Status: Full Code   Is the patient medically ready for discharge?: No    Reason for patient still in hospital (select all that apply): Patient trending condition and Treatment  Discharge Plan A: Home                  Andre Calvo DO  Department of Hospital Medicine   [facility] - Cardiology Stepdown

## 2024-01-24 NOTE — ASSESSMENT & PLAN NOTE
Bedside report received from Claudette Chopra RN all questions asked and answered. Managed by pulmonology with [facility] with close follow ups  Sildenafil recently discontinued  Uses 3L NC at baseline  Continue Warfarin. Daily PT/INR  May need to consult HTS given increased  Oxygen requirements and minimal improvement on intense diuresis in the setting of severe pulmonary HTN.

## 2024-01-24 NOTE — ASSESSMENT & PLAN NOTE
Patient with acute kidney injury/acute renal failure likely due to pre-renal azotemia due to IVVD AARON is currently improving. Baseline creatinine  1.2-1.5  - Labs reviewed- Renal function/electrolytes with Estimated Creatinine Clearance: 58.2 mL/min (A) (based on SCr of 1.8 mg/dL (H)). according to latest data. Monitor urine output and serial BMP and adjust therapy as needed. Avoid nephrotoxins and renally dose meds for GFR listed above.

## 2024-01-24 NOTE — SUBJECTIVE & OBJECTIVE
Interval History: NAEON. AFVSS. Patients reports feeling well overall, but he does state that he is urinating more frequently since starting the Lasix drip and adding Metolazone yesterday. Net negative UOP of 1.97 L yesterday. Daily weight today of 111.6 kg. Slight worsening of kidney function with Cr 2.0 from 1.8 yesterday, thought to be due to lag in diuresis. Potassium and Magnesium repleted.     Review of Systems  Objective:     Vital Signs (Most Recent):  Temp: 98.4 °F (36.9 °C) (01/24/24 1114)  Pulse: 92 (01/24/24 1204)  Resp: 19 (01/24/24 1114)  BP: 104/64 (01/24/24 1114)  SpO2: 95 % (01/24/24 1114) Vital Signs (24h Range):  Temp:  [97.5 °F (36.4 °C)-98.4 °F (36.9 °C)] 98.4 °F (36.9 °C)  Pulse:  [] 92  Resp:  [18-21] 19  SpO2:  [21 %-96 %] 95 %  BP: ()/(59-70) 104/64     Weight: 111.6 kg (246 lb 0.5 oz)  Body mass index is 41.58 kg/m².    Intake/Output Summary (Last 24 hours) at 1/24/2024 1435  Last data filed at 1/24/2024 0853  Gross per 24 hour   Intake 1322 ml   Output 3275 ml   Net -1953 ml         Physical Exam  Constitutional:       General: He is not in acute distress.     Appearance: Normal appearance. He is not ill-appearing.   HENT:      Head: Normocephalic and atraumatic.      Nose: Nose normal.      Mouth/Throat:      Mouth: Mucous membranes are moist.   Eyes:      Extraocular Movements: Extraocular movements intact.      Conjunctiva/sclera: Conjunctivae normal.      Pupils: Pupils are equal, round, and reactive to light.   Cardiovascular:      Rate and Rhythm: Normal rate and regular rhythm.      Pulses: Normal pulses.      Heart sounds: Normal heart sounds. No murmur heard.  Pulmonary:      Effort: Pulmonary effort is normal. No respiratory distress.      Breath sounds: Rales present. No wheezing or rhonchi.      Comments: Currently on 6L NC; crackles to bilateral lung bases  Abdominal:      General: Abdomen is flat. Bowel sounds are normal.      Palpations: Abdomen is soft.    Musculoskeletal:         General: Swelling present.      Right lower leg: Edema present.      Left lower leg: Edema present.   Skin:     General: Skin is warm and dry.      Capillary Refill: Capillary refill takes less than 2 seconds.   Neurological:      Mental Status: He is alert and oriented to person, place, and time.   Psychiatric:         Mood and Affect: Mood normal.         Behavior: Behavior normal.         Thought Content: Thought content normal.         Judgment: Judgment normal.             Significant Labs: All pertinent labs within the past 24 hours have been reviewed.    Significant Imaging: I have reviewed all pertinent imaging results/findings within the past 24 hours.

## 2024-01-24 NOTE — PLAN OF CARE
Pt remains free from falls/trauma/injury. POC reviewed with pt; pt verbalizes understanding. Lasix gtt initiated per order; see MAR. Pt remains on 6L high flow NC. 1500ml fluid restriction maintained.    Problem: Adult Inpatient Plan of Care  Goal: Plan of Care Review  Outcome: Ongoing, Progressing  Goal: Patient-Specific Goal (Individualized)  Outcome: Ongoing, Progressing  Goal: Absence of Hospital-Acquired Illness or Injury  Outcome: Ongoing, Progressing  Goal: Optimal Comfort and Wellbeing  Outcome: Ongoing, Progressing  Goal: Readiness for Transition of Care  Outcome: Ongoing, Progressing     Problem: Bariatric Environmental Safety  Goal: Safety Maintained with Care  Outcome: Ongoing, Progressing     Problem: Impaired Wound Healing  Goal: Optimal Wound Healing  Outcome: Ongoing, Progressing     Problem: Fluid and Electrolyte Imbalance (Acute Kidney Injury/Impairment)  Goal: Fluid and Electrolyte Balance  Outcome: Ongoing, Progressing     Problem: Oral Intake Inadequate (Acute Kidney Injury/Impairment)  Goal: Optimal Nutrition Intake  Outcome: Ongoing, Progressing     Problem: Renal Function Impairment (Acute Kidney Injury/Impairment)  Goal: Effective Renal Function  Outcome: Ongoing, Progressing     Problem: Adjustment to Illness (Heart Failure)  Goal: Optimal Coping  Outcome: Ongoing, Progressing     Problem: Cardiac Output Decreased (Heart Failure)  Goal: Optimal Cardiac Output  Outcome: Ongoing, Progressing     Problem: Dysrhythmia (Heart Failure)  Goal: Stable Heart Rate and Rhythm  Outcome: Ongoing, Progressing     Problem: Fluid Imbalance (Heart Failure)  Goal: Fluid Balance  Outcome: Ongoing, Progressing     Problem: Functional Ability Impaired (Heart Failure)  Goal: Optimal Functional Ability  Outcome: Ongoing, Progressing     Problem: Oral Intake Inadequate (Heart Failure)  Goal: Optimal Nutrition Intake  Outcome: Ongoing, Progressing     Problem: Respiratory Compromise (Heart Failure)  Goal: Effective  Oxygenation and Ventilation  Outcome: Ongoing, Progressing     Problem: Sleep Disordered Breathing (Heart Failure)  Goal: Effective Breathing Pattern During Sleep  Outcome: Ongoing, Progressing     Problem: Fall Injury Risk  Goal: Absence of Fall and Fall-Related Injury  Outcome: Ongoing, Progressing

## 2024-01-24 NOTE — ASSESSMENT & PLAN NOTE
Managed by pulmonology with [facility] with close follow ups  Sildenafil recently discontinued  On 3L NC at baseline  Continue Warfarin. Daily PT/INR  May need to consult HTS given increased  Oxygen requirements and minimal improvement on intense diuresis in the setting of severe pulmonary HTN.

## 2024-01-24 NOTE — PROGRESS NOTES
[facility] - Cardiology [facility] Medicine  Progress Note    Patient Name: Yong Mcintyre  MRN: [id number]  Patient Class: IP- Inpatient   Admission Date: 1/18/2024  Length of Stay: 4 days  Attending Physician: Campbell Velarde, *  Primary Care Provider: Gianni Escalona MD        Subjective:     Principal Problem:Acute on chronic heart failure with preserved ejection fraction (HFpEF)        HPI:  Patient is a 47 yo male with a PMH of HFpEF, pickwickian syndrome, HTN, pHTN, Chronic hypoxic respiratory failure (baseline oxygen req at home is 3 L), PFO (unsuccessful closure in 2006), AF (on warfarin for unknown reason) who present with worsening leg swelling and shortness of breath for the past 1 week. He has associated symptoms of orthopnea. He was admitted last month with similar symptoms and was discharged on lasix and metolazone and told to weigh himself daily as he may need dosage adjustment of his diuretics. Patient states he has been compliant with his medications with good urine output but has not been weighing himself. He denies any chest pain, nausea, vomiting, lightheadedness, or dizziness. Interview difficult as patient currently on Bipap.    While in the ED: Patient with oxygen saturation at 79%. Labs reveal worsened BNP >1000. New AARON with Cr 2.7 (up from baseline of 1.5). Initial VBG with pH 7.32 and CO2 83. Initially put on BIPAP but weaned off to high flow nasal cannula. ABG with A total 120 mg of IV Lasix given.      Overview/Hospital Course:  Patient was admitted to  with AHRF in setting of decompensated heart failure. Patient was initially diuresing well with 120mg TID IVP lasix and he was weaned down from 11L NC (on arrival) to 5-6L NC, however UOP started gradually decreasing. Now on lasix drip at 20 mg/hr and daily Metolazone 10mg. May need  gtt if he continues to have difficulty diuresing.       Interval History: NAEON. AFVSS. Patient reports concern that he has not been  urinating as frequently as prior. This is congruent with his UOP which has tapered off to 1.6L in past 24hrs, net -466cc. Gave metolazone 5mg prior to mid day lasix dose but with still poor UOP. Transitioned patient to lasix gtt at 20/hr and additional metolazone 10mg daily.     Review of Systems   Constitutional:  Negative for appetite change, chills, diaphoresis, fatigue and fever.   Eyes:  Negative for visual disturbance.   Respiratory:  Positive for shortness of breath. Negative for cough, chest tightness and wheezing.    Cardiovascular:  Positive for leg swelling. Negative for chest pain and palpitations.   Gastrointestinal:  Positive for abdominal pain (bloating). Negative for constipation, diarrhea, nausea and vomiting.   Genitourinary:  Negative for dysuria, frequency and urgency.   Neurological:  Negative for dizziness, facial asymmetry and light-headedness.     Objective:     Vital Signs (Most Recent):  Temp: 98.2 °F (36.8 °C) (01/23/24 1603)  Pulse: 94 (01/23/24 1603)  Resp: 20 (01/23/24 1603)  BP: 108/70 (01/23/24 1603)  SpO2: (!) 94 % (01/23/24 1603) Vital Signs (24h Range):  Temp:  [97.7 °F (36.5 °C)-98.5 °F (36.9 °C)] 98.2 °F (36.8 °C)  Pulse:  [] 94  Resp:  [12-22] 20  SpO2:  [90 %-100 %] 94 %  BP: ()/(54-70) 108/70     Weight: 114.4 kg (252 lb 3.3 oz)  Body mass index is 42.62 kg/m².    Intake/Output Summary (Last 24 hours) at 1/23/2024 1827  Last data filed at 1/23/2024 1707  Gross per 24 hour   Intake 942 ml   Output 1500 ml   Net -558 ml         Physical Exam  Constitutional:       General: He is not in acute distress.     Appearance: Normal appearance. He is not ill-appearing.   HENT:      Head: Normocephalic and atraumatic.      Nose: Nose normal.      Mouth/Throat:      Mouth: Mucous membranes are moist.   Eyes:      Extraocular Movements: Extraocular movements intact.      Conjunctiva/sclera: Conjunctivae normal.      Pupils: Pupils are equal, round, and reactive to light.    Cardiovascular:      Rate and Rhythm: Normal rate and regular rhythm.      Pulses: Normal pulses.      Heart sounds: Normal heart sounds. No murmur heard.  Pulmonary:      Effort: Pulmonary effort is normal. No respiratory distress.      Breath sounds: Rales present. No wheezing or rhonchi.      Comments: Currently on 6L NC  Abdominal:      General: Abdomen is flat. Bowel sounds are normal.      Palpations: Abdomen is soft.   Musculoskeletal:         General: Swelling present.      Right lower leg: Edema present.      Left lower leg: Edema present.   Skin:     General: Skin is warm and dry.      Capillary Refill: Capillary refill takes less than 2 seconds.   Neurological:      Mental Status: He is alert and oriented to person, place, and time.   Psychiatric:         Mood and Affect: Mood normal.         Behavior: Behavior normal.         Thought Content: Thought content normal.         Judgment: Judgment normal.             Significant Labs: All pertinent labs within the past 24 hours have been reviewed.  CBC:   Recent Labs   Lab 01/22/24  0401 01/23/24  0608   WBC 7.12 7.09   HGB 15.6 15.5   HCT 56.8* 56.0*    205     CMP:   Recent Labs   Lab 01/22/24  0401 01/22/24  1304 01/23/24  0608 01/23/24  1254    140 141 139   K 4.4 4.1 4.1 4.2   CL 88* 89* 90* 88*   CO2 43* 41* 39* 41*   GLU 86 117* 90 121*   BUN 68* 64* 65* 67*   CREATININE 1.8* 1.7* 1.7* 1.8*   CALCIUM 9.9 9.6 9.8 9.9   PROT 7.7  --  7.8  --    ALBUMIN 2.8*  --  2.8*  --    BILITOT 2.3*  --  1.9*  --    ALKPHOS 102  --  110  --    AST 23  --  22  --    ALT 12  --  14  --    ANIONGAP 9 10 12 10       Significant Imaging: I have reviewed all pertinent imaging results/findings within the past 24 hours.    Assessment/Plan:      * Acute on chronic heart failure with preserved ejection fraction (HFpEF)  Patient is identified as having Diastolic (HFpEF) heart failure that is Acute on chronic. CHF is currently uncontrolled due to Continued edema  of extremities, >3 pillow orthopnea, and Rales/crackles on pulmonary exam. CXR with underlying edema.    Echo (01/19)   Left Ventricle: The left ventricle is normal in size. Normal wall thickness. Normal wall motion. Septal flattening in systole consistent with right ventricular pressure overload. There is normal systolic function. Ejection fraction by visual approximation is 55%. There is normal diastolic function.    Right Ventricle: Severe right ventricular enlargement. Wall thickness is normal. Right ventricle wall motion has global hypokinesis. Systolic function is severely reduced.    Tricuspid Valve: There is moderate to severe regurgitation.    Pulmonary Artery: There is moderate to severe pulmonary hypertension. The estimated pulmonary artery systolic pressure is 67 mmHg.    IVC/SVC: Intermediate venous pressure at 8 mmHg.    Pericardium: There is a trivial effusion.    Recent Labs   Lab 01/18/24  2216   BNP 1,073*       -Initially on Lasix 120 mg IVP TID and diuresing well, however now with gradually decreasing UOP. Transitioned to Lasix gtt at 20/hr and metolazone 10mg q.d.  -Continue Beta Blocker, rest of GDMT is precluded by AARON    -Monitor strict Is&Os and daily weights.    -Place on fluid restriction of 1.5 L.   -Low salt diet     Chronic right-sided heart failure  Repeat Echo showing severe right ventricular enlargement, normal wall thickness, global hypokinesis of right ventricular wall, and systolic function is severely reduced.       Epigastric abdominal pain  Described as bloating.  -Has follow up with GI on February 2nd  -Patient reports improvement following diuresis; likely related to gut edema with CHF exacerbation      Chronic asthmatic bronchitis  - Continue home inhaler alternatives       Acute kidney injury superimposed on CKD  Patient with acute kidney injury/acute renal failure likely due to pre-renal azotemia due to IVVD AARON is currently improving. Baseline creatinine  1.2-1.5  - Labs  reviewed- Renal function/electrolytes with Estimated Creatinine Clearance: 58.2 mL/min (A) (based on SCr of 1.8 mg/dL (H)). according to latest data. Monitor urine output and serial BMP and adjust therapy as needed. Avoid nephrotoxins and renally dose meds for GFR listed above.    Acute on chronic respiratory failure with hypoxia and hypercapnia  Patient with Hypercapnic and Hypoxic Respiratory failure which is Acute on chronic.  he is on home oxygen at 3 LPM. CXR with underlying edema.    Likely related to CHF exacerbation. Patient is a chronic CO2 retainer with Pickwickian syndrome.    -Diuresis  -Continuous oxygen and pulse ox  -BiPAP QHS      MALLIKA (obstructive sleep apnea)  -BiPAP QHS      PFO (patent foramen ovale)  Thought to be cause of patient's structural heart disease.      Pulmonary hypertension  Managed by pulmonology with [facility] with close follow ups  Sildenafil recently discontinued  On 3L NC at baseline  Continue Warfarin. Daily PT/INR  May need to consult HTS given increased  Oxygen requirements and minimal improvement on intense diuresis in the setting of severe pulmonary HTN.      Pickwickian syndrome  Body mass index is 42.62 kg/m². Morbid obesity complicates all aspects of disease management from diagnostic modalities to treatment. Weight loss encouraged and health benefits explained to patient.     - BiPAP QHS as tolerated         VTE Risk Mitigation (From admission, onward)           Ordered     warfarin (COUMADIN) tablet 5 mg  Daily         01/20/24 2255     IP VTE HIGH RISK PATIENT  Once         01/19/24 0208     Place sequential compression device  Until discontinued         01/19/24 0208                    Discharge Planning   ESTEBAN: 1/25/2024     Code Status: Full Code   Is the patient medically ready for discharge?: No    Reason for patient still in hospital (select all that apply): Treatment  Discharge Plan A: Home        Isadora Armenta MD  Department of Hospital Medicine   [facility] -  Cardiology Stepdown

## 2024-01-24 NOTE — ASSESSMENT & PLAN NOTE
Patient with Hypercapnic and Hypoxic Respiratory failure which is Acute on chronic.  he is on home oxygen at 3 LPM. CXR with underlying edema.    Likely related to CHF exacerbation. Patient is a chronic CO2 retainer with Pickwickian syndrome.    -Diuresis  -Continuous oxygen and pulse ox  -BiPAP QHS

## 2024-01-24 NOTE — ASSESSMENT & PLAN NOTE
Patient is identified as having Diastolic (HFpEF) heart failure that is Acute on chronic. CHF is currently uncontrolled due to Continued edema of extremities, >3 pillow orthopnea, and Rales/crackles on pulmonary exam. CXR with underlying edema.    Echo (01/19)   Left Ventricle: The left ventricle is normal in size. Normal wall thickness. Normal wall motion. Septal flattening in systole consistent with right ventricular pressure overload. There is normal systolic function. Ejection fraction by visual approximation is 55%. There is normal diastolic function.    Right Ventricle: Severe right ventricular enlargement. Wall thickness is normal. Right ventricle wall motion has global hypokinesis. Systolic function is severely reduced.    Tricuspid Valve: There is moderate to severe regurgitation.    Pulmonary Artery: There is moderate to severe pulmonary hypertension. The estimated pulmonary artery systolic pressure is 67 mmHg.    IVC/SVC: Intermediate venous pressure at 8 mmHg.    Pericardium: There is a trivial effusion.    Recent Labs   Lab 01/18/24  2216   BNP 1,073*       -Initially on Lasix 120 mg IVP TID and diuresing well, however now with gradually decreasing UOP. Transitioned to Lasix gtt at 20/hr and metolazone 10mg q.d.  -Continue Beta Blocker, rest of GDMT is precluded by AARON    -Monitor strict Is&Os and daily weights.    -Place on fluid restriction of 1.5 L.   -Low salt diet

## 2024-01-24 NOTE — ASSESSMENT & PLAN NOTE
Body mass index is 41.58 kg/m². Morbid obesity complicates all aspects of disease management from diagnostic modalities to treatment. Weight loss encouraged and health benefits explained to patient.     - BiPAP QHS as tolerated

## 2024-01-25 LAB
ALBUMIN SERPL BCP-MCNC: 2.9 G/DL (ref 3.5–5.2)
ALP SERPL-CCNC: 122 U/L (ref 55–135)
ALT SERPL W/O P-5'-P-CCNC: 16 U/L (ref 10–44)
ANION GAP SERPL CALC-SCNC: 14 MMOL/L (ref 8–16)
ANION GAP SERPL CALC-SCNC: 7 MMOL/L (ref 8–16)
AST SERPL-CCNC: 24 U/L (ref 10–40)
BASOPHILS # BLD AUTO: 0.05 K/UL (ref 0–0.2)
BASOPHILS NFR BLD: 0.7 % (ref 0–1.9)
BILIRUB SERPL-MCNC: 1.9 MG/DL (ref 0.1–1)
BUN SERPL-MCNC: 70 MG/DL (ref 6–20)
BUN SERPL-MCNC: 73 MG/DL (ref 6–20)
CALCIUM SERPL-MCNC: 9.4 MG/DL (ref 8.7–10.5)
CALCIUM SERPL-MCNC: 9.8 MG/DL (ref 8.7–10.5)
CHLORIDE SERPL-SCNC: 85 MMOL/L (ref 95–110)
CHLORIDE SERPL-SCNC: 86 MMOL/L (ref 95–110)
CO2 SERPL-SCNC: 40 MMOL/L (ref 23–29)
CO2 SERPL-SCNC: 47 MMOL/L (ref 23–29)
CREAT SERPL-MCNC: 1.9 MG/DL (ref 0.5–1.4)
CREAT SERPL-MCNC: 2.2 MG/DL (ref 0.5–1.4)
DIFFERENTIAL METHOD BLD: ABNORMAL
EOSINOPHIL # BLD AUTO: 0 K/UL (ref 0–0.5)
EOSINOPHIL NFR BLD: 0 % (ref 0–8)
ERYTHROCYTE [DISTWIDTH] IN BLOOD BY AUTOMATED COUNT: 21.5 % (ref 11.5–14.5)
EST. GFR  (NO RACE VARIABLE): 36 ML/MIN/1.73 M^2
EST. GFR  (NO RACE VARIABLE): 43 ML/MIN/1.73 M^2
GLUCOSE SERPL-MCNC: 106 MG/DL (ref 70–110)
GLUCOSE SERPL-MCNC: 84 MG/DL (ref 70–110)
HCT VFR BLD AUTO: 55.6 % (ref 40–54)
HGB BLD-MCNC: 16 G/DL (ref 14–18)
IMM GRANULOCYTES # BLD AUTO: 0.02 K/UL (ref 0–0.04)
IMM GRANULOCYTES NFR BLD AUTO: 0.3 % (ref 0–0.5)
INR PPP: 1.7 (ref 0.8–1.2)
LYMPHOCYTES # BLD AUTO: 1.1 K/UL (ref 1–4.8)
LYMPHOCYTES NFR BLD: 14.9 % (ref 18–48)
MAGNESIUM SERPL-MCNC: 1.9 MG/DL (ref 1.6–2.6)
MCH RBC QN AUTO: 24.9 PG (ref 27–31)
MCHC RBC AUTO-ENTMCNC: 28.8 G/DL (ref 32–36)
MCV RBC AUTO: 87 FL (ref 82–98)
MONOCYTES # BLD AUTO: 0.6 K/UL (ref 0.3–1)
MONOCYTES NFR BLD: 7.6 % (ref 4–15)
NEUTROPHILS # BLD AUTO: 5.7 K/UL (ref 1.8–7.7)
NEUTROPHILS NFR BLD: 76.5 % (ref 38–73)
NRBC BLD-RTO: 0 /100 WBC
PHOSPHATE SERPL-MCNC: 4 MG/DL (ref 2.7–4.5)
PLATELET # BLD AUTO: 200 K/UL (ref 150–450)
PMV BLD AUTO: 9.7 FL (ref 9.2–12.9)
POTASSIUM SERPL-SCNC: 3.8 MMOL/L (ref 3.5–5.1)
POTASSIUM SERPL-SCNC: 4.1 MMOL/L (ref 3.5–5.1)
PROT SERPL-MCNC: 8.4 G/DL (ref 6–8.4)
PROTHROMBIN TIME: 18 SEC (ref 9–12.5)
RBC # BLD AUTO: 6.42 M/UL (ref 4.6–6.2)
SODIUM SERPL-SCNC: 139 MMOL/L (ref 136–145)
SODIUM SERPL-SCNC: 140 MMOL/L (ref 136–145)
WBC # BLD AUTO: 7.5 K/UL (ref 3.9–12.7)

## 2024-01-25 PROCEDURE — 25000003 PHARM REV CODE 250: Mod: HCNC

## 2024-01-25 PROCEDURE — 85025 COMPLETE CBC W/AUTO DIFF WBC: CPT | Mod: HCNC

## 2024-01-25 PROCEDURE — 80048 BASIC METABOLIC PNL TOTAL CA: CPT | Mod: HCNC,XB

## 2024-01-25 PROCEDURE — 25000003 PHARM REV CODE 250: Mod: HCNC | Performed by: STUDENT IN AN ORGANIZED HEALTH CARE EDUCATION/TRAINING PROGRAM

## 2024-01-25 PROCEDURE — 94640 AIRWAY INHALATION TREATMENT: CPT | Mod: HCNC

## 2024-01-25 PROCEDURE — 20600001 HC STEP DOWN PRIVATE ROOM: Mod: HCNC

## 2024-01-25 PROCEDURE — 80053 COMPREHEN METABOLIC PANEL: CPT | Mod: HCNC

## 2024-01-25 PROCEDURE — 99223 1ST HOSP IP/OBS HIGH 75: CPT | Mod: HCNC,GC,, | Performed by: INTERNAL MEDICINE

## 2024-01-25 PROCEDURE — 25000242 PHARM REV CODE 250 ALT 637 W/ HCPCS: Mod: HCNC

## 2024-01-25 PROCEDURE — 83735 ASSAY OF MAGNESIUM: CPT | Mod: HCNC

## 2024-01-25 PROCEDURE — 85610 PROTHROMBIN TIME: CPT | Mod: HCNC

## 2024-01-25 PROCEDURE — 63600175 PHARM REV CODE 636 W HCPCS: Mod: HCNC

## 2024-01-25 PROCEDURE — 94761 N-INVAS EAR/PLS OXIMETRY MLT: CPT | Mod: HCNC

## 2024-01-25 PROCEDURE — 84100 ASSAY OF PHOSPHORUS: CPT | Mod: HCNC

## 2024-01-25 PROCEDURE — 99900035 HC TECH TIME PER 15 MIN (STAT): Mod: HCNC

## 2024-01-25 PROCEDURE — 36415 COLL VENOUS BLD VENIPUNCTURE: CPT | Mod: HCNC

## 2024-01-25 PROCEDURE — 27000221 HC OXYGEN, UP TO 24 HOURS: Mod: HCNC

## 2024-01-25 RX ORDER — POTASSIUM CHLORIDE 750 MG/1
10 CAPSULE, EXTENDED RELEASE ORAL 2 TIMES DAILY
COMMUNITY

## 2024-01-25 RX ORDER — WARFARIN SODIUM 5 MG/1
5 TABLET ORAL DAILY
Status: DISCONTINUED | OUTPATIENT
Start: 2024-01-26 | End: 2024-02-02 | Stop reason: HOSPADM

## 2024-01-25 RX ORDER — WARFARIN 7.5 MG/1
7.5 TABLET ORAL ONCE
Status: COMPLETED | OUTPATIENT
Start: 2024-01-25 | End: 2024-01-25

## 2024-01-25 RX ORDER — PEG 400/HYPROMELLOSE/GLYCERIN 1 %-0.36 %
2 DROPS OPHTHALMIC (EYE) DAILY PRN
COMMUNITY
End: 2024-04-23

## 2024-01-25 RX ADMIN — METOLAZONE 10 MG: 5 TABLET ORAL at 08:01

## 2024-01-25 RX ADMIN — METOPROLOL TARTRATE 25 MG: 25 TABLET, FILM COATED ORAL at 09:01

## 2024-01-25 RX ADMIN — FLUTICASONE FUROATE AND VILANTEROL TRIFENATATE 1 PUFF: 100; 25 POWDER RESPIRATORY (INHALATION) at 08:01

## 2024-01-25 RX ADMIN — METOPROLOL TARTRATE 25 MG: 25 TABLET, FILM COATED ORAL at 08:01

## 2024-01-25 RX ADMIN — PANTOPRAZOLE SODIUM 40 MG: 40 TABLET, DELAYED RELEASE ORAL at 08:01

## 2024-01-25 RX ADMIN — TIOTROPIUM BROMIDE INHALATION SPRAY 2 PUFF: 3.12 SPRAY, METERED RESPIRATORY (INHALATION) at 08:01

## 2024-01-25 RX ADMIN — MONTELUKAST 10 MG: 10 TABLET, FILM COATED ORAL at 09:01

## 2024-01-25 RX ADMIN — WARFARIN SODIUM 7.5 MG: 7.5 TABLET ORAL at 04:01

## 2024-01-25 RX ADMIN — FUROSEMIDE 20 MG/HR: 10 INJECTION, SOLUTION INTRAMUSCULAR; INTRAVENOUS at 06:01

## 2024-01-25 NOTE — ASSESSMENT & PLAN NOTE
Patient with Hypercapnic and Hypoxic Respiratory failure which is Acute on chronic.  he is on home oxygen at 3 LPM. CXR with underlying edema.    Likely related to CHF exacerbation. Patient is a chronic CO2 retainer with Pickwickian syndrome.    -Diuresis with Lasix gtt and Metolazone  -Continuous oxygen and pulse ox  -BiPAP QHS

## 2024-01-25 NOTE — PROGRESS NOTES
[facility] - Cardiology [facility] Medicine  Progress Note    Patient Name: Yong Mcintyre  MRN: [id number]  Patient Class: IP- Inpatient   Admission Date: 1/18/2024  Length of Stay: 6 days  Attending Physician: Campbell Velarde, *  Primary Care Provider: Gianni Escalona MD        Subjective:     Principal Problem:Acute on chronic heart failure with preserved ejection fraction (HFpEF)        HPI:  Patient is a 47 yo male with a PMH of HFpEF, pickwickian syndrome, HTN, pHTN, Chronic hypoxic respiratory failure (baseline oxygen req at home is 3 L), PFO (unsuccessful closure in 2006), AF (on warfarin for unknown reason) who present with worsening leg swelling and shortness of breath for the past 1 week. He has associated symptoms of orthopnea. He was admitted last month with similar symptoms and was discharged on lasix and metolazone and told to weigh himself daily as he may need dosage adjustment of his diuretics. Patient states he has been compliant with his medications with good urine output but has not been weighing himself. He denies any chest pain, nausea, vomiting, lightheadedness, or dizziness. Interview difficult as patient currently on Bipap.    While in the ED: Patient with oxygen saturation at 79%. Labs reveal worsened BNP >1000. New AARON with Cr 2.7 (up from baseline of 1.5). Initial VBG with pH 7.32 and CO2 83. Initially put on BIPAP but weaned off to high flow nasal cannula. ABG with A total 120 mg of IV Lasix given.      Overview/Hospital Course:  Patient was admitted to  with AHRF in setting of decompensated heart failure. Patient was initially diuresing well with 120mg TID IVP lasix and he was weaned down from 11L NC (on arrival) to 5-6L NC, however UOP started gradually decreasing. Now on lasix drip at 20 mg/hr and daily Metolazone 10mg. May need  gtt if he continues to have difficulty diuresing.       Interval History: NAEON. AFVSS. Patient reports feeling similar to yesterday, but  does state he is having increased urination while on the lasix drip and Metolazone. Net negative UOP of 3.6 L in last 24 hrs, daily weight of 246 lbs (6 lbs away from dry weight). However, still requiring 6 L O2 even with intense diuresis and high UOP. Consulted HTS for further advanced treatment options if possible and recommended.    Review of Systems  Objective:     Vital Signs (Most Recent):  Temp: 97.3 °F (36.3 °C) (01/25/24 1153)  Pulse: 98 (01/25/24 1400)  Resp: 18 (01/25/24 1153)  BP: (!) 104/56 (01/25/24 1153)  SpO2: (!) 94 % (01/25/24 1153) Vital Signs (24h Range):  Temp:  [97.3 °F (36.3 °C)-98.3 °F (36.8 °C)] 97.3 °F (36.3 °C)  Pulse:  [] 98  Resp:  [12-20] 18  SpO2:  [90 %-97 %] 94 %  BP: ()/(55-70) 104/56     Weight: 111.6 kg (246 lb 0.5 oz)  Body mass index is 41.58 kg/m².    Intake/Output Summary (Last 24 hours) at 1/25/2024 1441  Last data filed at 1/25/2024 1410  Gross per 24 hour   Intake 838 ml   Output 5175 ml   Net -4337 ml         Physical Exam  Constitutional:       General: He is not in acute distress.     Appearance: Normal appearance. He is not ill-appearing.   HENT:      Head: Normocephalic and atraumatic.      Nose: Nose normal.      Mouth/Throat:      Mouth: Mucous membranes are moist.   Eyes:      Extraocular Movements: Extraocular movements intact.      Conjunctiva/sclera: Conjunctivae normal.      Pupils: Pupils are equal, round, and reactive to light.   Cardiovascular:      Rate and Rhythm: Normal rate and regular rhythm.      Pulses: Normal pulses.      Heart sounds: Normal heart sounds. No murmur heard.  Pulmonary:      Effort: Pulmonary effort is normal. No respiratory distress.      Breath sounds: Rales present. No wheezing or rhonchi.      Comments: Currently on 6L NC; crackles to bilateral lung bases  Abdominal:      General: Abdomen is flat. Bowel sounds are normal.      Palpations: Abdomen is soft.   Musculoskeletal:         General: Swelling present.      Right  lower leg: Edema present.      Left lower leg: Edema present.   Skin:     General: Skin is warm and dry.      Capillary Refill: Capillary refill takes less than 2 seconds.   Neurological:      Mental Status: He is alert and oriented to person, place, and time.   Psychiatric:         Mood and Affect: Mood normal.         Behavior: Behavior normal.         Thought Content: Thought content normal.         Judgment: Judgment normal.             Significant Labs: All pertinent labs within the past 24 hours have been reviewed.    Significant Imaging: I have reviewed all pertinent imaging results/findings within the past 24 hours.    Assessment/Plan:      * Acute on chronic heart failure with preserved ejection fraction (HFpEF)  Patient is identified as having Diastolic (HFpEF) heart failure that is Acute on chronic. CHF is currently uncontrolled due to Continued edema of extremities, >3 pillow orthopnea, and Rales/crackles on pulmonary exam. CXR with underlying edema.    Echo (01/19)   Left Ventricle: The left ventricle is normal in size. Normal wall thickness. Normal wall motion. Septal flattening in systole consistent with right ventricular pressure overload. There is normal systolic function. Ejection fraction by visual approximation is 55%. There is normal diastolic function.    Right Ventricle: Severe right ventricular enlargement. Wall thickness is normal. Right ventricle wall motion has global hypokinesis. Systolic function is severely reduced.    Tricuspid Valve: There is moderate to severe regurgitation.    Pulmonary Artery: There is moderate to severe pulmonary hypertension. The estimated pulmonary artery systolic pressure is 67 mmHg.    IVC/SVC: Intermediate venous pressure at 8 mmHg.    Pericardium: There is a trivial effusion.    Recent Labs   Lab 01/18/24  2216   BNP 1,073*       -Initially on Lasix 120 mg IVP TID and diuresing well, however now with gradually decreasing UOP. Transitioned to Lasix gtt at  20/hr and metolazone 10mg q.d.  -Continue Beta Blocker, rest of GDMT is precluded by AARON    -Monitor strict Is&Os and daily weights.    -Place on fluid restriction of 1.5 L.   -Low salt diet     Chronic right-sided heart failure  Repeat Echo showing severe right ventricular enlargement, normal wall thickness, global hypokinesis of right ventricular wall, and systolic function is severely reduced.   - Consulted HTS      Epigastric abdominal pain  Described as bloating.  -Has follow up with GI on February 2nd  -Patient reports improvement following diuresis; likely related to gut edema with CHF exacerbation      Chronic asthmatic bronchitis  - Continue home inhaler alternatives       Acute kidney injury superimposed on CKD  Patient with acute kidney injury/acute renal failure likely due to pre-renal azotemia due to IVVD AARON is currently improving. Baseline creatinine  1.2-1.5  - Labs reviewed- Renal function/electrolytes with Estimated Creatinine Clearance: 54.4 mL/min (A) (based on SCr of 1.9 mg/dL (H)). according to latest data. Monitor urine output and serial BMP and adjust therapy as needed. Avoid nephrotoxins and renally dose meds for GFR listed above.    Acute on chronic respiratory failure with hypoxia and hypercapnia  Patient with Hypercapnic and Hypoxic Respiratory failure which is Acute on chronic.  he is on home oxygen at 3 LPM. CXR with underlying edema.    Likely related to CHF exacerbation. Patient is a chronic CO2 retainer with Pickwickian syndrome.    -Diuresis with Lasix gtt and Metolazone  -Continuous oxygen and pulse ox  -BiPAP QHS      MALLIKA (obstructive sleep apnea)  -BiPAP QHS      PFO (patent foramen ovale)  Thought to be cause of patient's structural heart disease.      Pulmonary hypertension  Managed by pulmonology with [facility] with close follow ups  Sildenafil recently discontinued  Uses 3L NC at baseline  Continue Warfarin. Daily PT/INR  Consulted HTS due to minimal improvement in O2 requirements  following intense diuresis    Pickwickian syndrome  Body mass index is 41.58 kg/m². Morbid obesity complicates all aspects of disease management from diagnostic modalities to treatment. Weight loss encouraged and health benefits explained to patient.     - BiPAP QHS as tolerated         VTE Risk Mitigation (From admission, onward)           Ordered     warfarin (COUMADIN) tablet 5 mg  Daily         01/25/24 1007     warfarin (COUMADIN) tablet 7.5 mg  Once         01/25/24 1007     IP VTE HIGH RISK PATIENT  Once         01/19/24 0208     Place sequential compression device  Until discontinued         01/19/24 0208                    Discharge Planning   ESTEBAN: 1/29/2024     Code Status: Full Code   Is the patient medically ready for discharge?: No    Reason for patient still in hospital (select all that apply): Patient trending condition and Treatment  Discharge Plan A: Home                  Andre Calvo DO  Department of Hospital Medicine   [facility] - Cardiology Step[facility]

## 2024-01-25 NOTE — ASSESSMENT & PLAN NOTE
Managed by pulmonology with [facility] with close follow ups  Sildenafil recently discontinued  Uses 3L NC at baseline  Continue Warfarin. Daily PT/INR  Consulted [facility] due to minimal improvement in O2 requirements following intense diuresis

## 2024-01-25 NOTE — PLAN OF CARE
Plan of care reviewed with patient. Patient is AOX4 and VS stable. Patient remained free of falls and trauma, fall precautions are in place. Patient is ambulating independently. Patient has no complaints of pain. Patient on 6L NC. Lasix gtt continuing. Patient has no questions at this time. Wheels are locked and the bed is in lowest position. The call bell is within reach. Telemetry is on.

## 2024-01-25 NOTE — SUBJECTIVE & OBJECTIVE
Interval History: NAEON. AFVSS. Patient reports feeling similar to yesterday, but does state he is having increased urination while on the lasix drip and Metolazone. Net negative UOP of 3.6 L in last 24 hrs, daily weight of 246 lbs (6 lbs away from dry weight). However, still requiring 6 L O2 even with intense diuresis and high UOP. Consulted HTS for further advanced treatment options if possible and recommended.    Review of Systems  Objective:     Vital Signs (Most Recent):  Temp: 97.3 °F (36.3 °C) (01/25/24 1153)  Pulse: 98 (01/25/24 1400)  Resp: 18 (01/25/24 1153)  BP: (!) 104/56 (01/25/24 1153)  SpO2: (!) 94 % (01/25/24 1153) Vital Signs (24h Range):  Temp:  [97.3 °F (36.3 °C)-98.3 °F (36.8 °C)] 97.3 °F (36.3 °C)  Pulse:  [] 98  Resp:  [12-20] 18  SpO2:  [90 %-97 %] 94 %  BP: ()/(55-70) 104/56     Weight: 111.6 kg (246 lb 0.5 oz)  Body mass index is 41.58 kg/m².    Intake/Output Summary (Last 24 hours) at 1/25/2024 1441  Last data filed at 1/25/2024 1410  Gross per 24 hour   Intake 838 ml   Output 5175 ml   Net -4337 ml         Physical Exam  Constitutional:       General: He is not in acute distress.     Appearance: Normal appearance. He is not ill-appearing.   HENT:      Head: Normocephalic and atraumatic.      Nose: Nose normal.      Mouth/Throat:      Mouth: Mucous membranes are moist.   Eyes:      Extraocular Movements: Extraocular movements intact.      Conjunctiva/sclera: Conjunctivae normal.      Pupils: Pupils are equal, round, and reactive to light.   Cardiovascular:      Rate and Rhythm: Normal rate and regular rhythm.      Pulses: Normal pulses.      Heart sounds: Normal heart sounds. No murmur heard.  Pulmonary:      Effort: Pulmonary effort is normal. No respiratory distress.      Breath sounds: Rales present. No wheezing or rhonchi.      Comments: Currently on 6L NC; crackles to bilateral lung bases  Abdominal:      General: Abdomen is flat. Bowel sounds are normal.      Palpations:  Abdomen is soft.   Musculoskeletal:         General: Swelling present.      Right lower leg: Edema present.      Left lower leg: Edema present.   Skin:     General: Skin is warm and dry.      Capillary Refill: Capillary refill takes less than 2 seconds.   Neurological:      Mental Status: He is alert and oriented to person, place, and time.   Psychiatric:         Mood and Affect: Mood normal.         Behavior: Behavior normal.         Thought Content: Thought content normal.         Judgment: Judgment normal.             Significant Labs: All pertinent labs within the past 24 hours have been reviewed.    Significant Imaging: I have reviewed all pertinent imaging results/findings within the past 24 hours.

## 2024-01-25 NOTE — RESPIRATORY THERAPY
"RAPID RESPONSE RESPIRATORY THERAPY PROACTIVE ROUNDING NOTE             Time of visit: 07     Code Status: Full Code   : 1975  Bed: 330/330 A:   MRN: [id number]  Time spent at the bedside: < 15 min    SITUATION    Evaluated patient for: HFNC Compliance     BACKGROUND    Patient has a past medical history of Arthritis, CHF (congestive heart failure), Cor pulmonale, Gallstones, Hypertension, Morbid obesity, Obesity hypoventilation syndrome, On home oxygen therapy, MALLIKA (obstructive sleep apnea), Paroxysmal atrial fibrillation, PFO (patent foramen ovale), Pickwickian syndrome, and Pulmonary hypertension.    24 Hours Vitals Range:  Temp:  [97.3 °F (36.3 °C)-98.3 °F (36.8 °C)]   Pulse:  []   Resp:  [12-20]   BP: ()/(55-70)   SpO2:  [90 %-97 %]     Labs:    Recent Labs     24  0846 24  1254 24  0505 24  1822 24  0447 24  1358   NA  --    < > 140 139 140 139   K  --    < > 3.4* 4.2 4.1 3.8   CL  --    < > 86* 87* 86* 85*   CO2  --    < > 41* 39* 40* 47*   BUN  --    < > 69* 67* 73* 70*   CREATININE  --    < > 2.0* 2.0* 1.9* 2.2*   GLU  --    < > 104 114* 84 106   PHOS 3.1  --  3.9  --  4.0  --    MG 1.9  --  1.5*  --  1.9  --     < > = values in this interval not displayed.        No results for input(s): "PH", "PCO2", "PO2", "HCO3", "POCSATURATED", "BE" in the last 72 hours.    ASSESSMENT/INTERVENTIONS        Last VS   Temp: 97.3 °F (36.3 °C) ( 1153)  Pulse: 94 ( 1508)  Resp: 18 ( 1153)  BP: 104/56 ( 1153)  SpO2: 94 % ( 1153)    Level of Consciousness: Level of Consciousness (AVPU): alert  Respiratory Effort: Respiratory Effort: Normal, Unlabored Expansion/Accessory Muscle Usage: Expansion/Accessory Muscles/Retractions: expansion symmetric, no retractions, no use of accessory muscles  All Lung Field Breath Sounds: All Lung Fields Breath Sounds: Anterior:, Lateral:, coarse  O2 Device/Concentration: 6L  Was the O2 device able to be weaned? " No  Ambu at bedside:      Active Orders   Respiratory Care    Bipap QHS     Frequency: QHS     Number of Occurrences: Until Specified     Order Comments: Wean as tolerated       Order Questions:      Mode Bilevel      FiO2% 50      Inspiratory pressure: 18      Expiratory pressure: 12    Oxygen Continuous     Frequency: Continuous     Number of Occurrences: Until Specified     Order Questions:      Device type: High flow      Device: High Flow Nasal Cannula (6 -15 Liters)      LPM: 6-15      Titrate O2 per Oxygen Titration Protocol: Yes      To maintain SpO2 goal of: >= 90%      Notify MD of: Inability to achieve desired SpO2; Sudden change in patient status and requires 20% increase in FiO2; Patient requires >60% FiO2    Pulse Oximetry Continuous     Frequency: Continuous     Number of Occurrences: Until Specified       RECOMMENDATIONS    We recommend: RRT Recs: Continue POC per primary team.      FOLLOW-UP    Please call back the Rapid Response RT, Soniya Corrigan RRT at x [phone] for any questions or concerns.

## 2024-01-25 NOTE — PLAN OF CARE
Pt remains free from falls/trauma/injury. POC reviewed with pt; pt verbalizes understanding. Lasix gtt maintained per order. Pt remains on 6L high flow NC. 1500ml fluid restriction maintained. Mg and K+ replaced per order.     Problem: Adult Inpatient Plan of Care  Goal: Plan of Care Review  Outcome: Ongoing, Progressing  Goal: Patient-Specific Goal (Individualized)  Outcome: Ongoing, Progressing  Goal: Absence of Hospital-Acquired Illness or Injury  Outcome: Ongoing, Progressing  Goal: Optimal Comfort and Wellbeing  Outcome: Ongoing, Progressing  Goal: Readiness for Transition of Care  Outcome: Ongoing, Progressing     Problem: Bariatric Environmental Safety  Goal: Safety Maintained with Care  Outcome: Ongoing, Progressing     Problem: Impaired Wound Healing  Goal: Optimal Wound Healing  Outcome: Ongoing, Progressing     Problem: Fluid and Electrolyte Imbalance (Acute Kidney Injury/Impairment)  Goal: Fluid and Electrolyte Balance  Outcome: Ongoing, Progressing     Problem: Oral Intake Inadequate (Acute Kidney Injury/Impairment)  Goal: Optimal Nutrition Intake  Outcome: Ongoing, Progressing     Problem: Renal Function Impairment (Acute Kidney Injury/Impairment)  Goal: Effective Renal Function  Outcome: Ongoing, Progressing     Problem: Adjustment to Illness (Heart Failure)  Goal: Optimal Coping  Outcome: Ongoing, Progressing     Problem: Cardiac Output Decreased (Heart Failure)  Goal: Optimal Cardiac Output  Outcome: Ongoing, Progressing     Problem: Dysrhythmia (Heart Failure)  Goal: Stable Heart Rate and Rhythm  Outcome: Ongoing, Progressing     Problem: Fluid Imbalance (Heart Failure)  Goal: Fluid Balance  Outcome: Ongoing, Progressing     Problem: Functional Ability Impaired (Heart Failure)  Goal: Optimal Functional Ability  Outcome: Ongoing, Progressing     Problem: Oral Intake Inadequate (Heart Failure)  Goal: Optimal Nutrition Intake  Outcome: Ongoing, Progressing     Problem: Respiratory Compromise (Heart  Failure)  Goal: Effective Oxygenation and Ventilation  Outcome: Ongoing, Progressing     Problem: Sleep Disordered Breathing (Heart Failure)  Goal: Effective Breathing Pattern During Sleep  Outcome: Ongoing, Progressing     Problem: Fall Injury Risk  Goal: Absence of Fall and Fall-Related Injury  Outcome: Ongoing, Progressing

## 2024-01-25 NOTE — PLAN OF CARE
Problem: Adult Inpatient Plan of Care  Goal: Plan of Care Review  Outcome: Ongoing, Progressing  Goal: Patient-Specific Goal (Individualized)  Outcome: Ongoing, Progressing  Goal: Absence of Hospital-Acquired Illness or Injury  Outcome: Ongoing, Progressing  Goal: Optimal Comfort and Wellbeing  Outcome: Ongoing, Progressing  Goal: Readiness for Transition of Care  Outcome: Ongoing, Progressing     Problem: Bariatric Environmental Safety  Goal: Safety Maintained with Care  Outcome: Ongoing, Progressing     Problem: Impaired Wound Healing  Goal: Optimal Wound Healing  Outcome: Ongoing, Progressing     Problem: Fluid and Electrolyte Imbalance (Acute Kidney Injury/Impairment)  Goal: Fluid and Electrolyte Balance  Outcome: Ongoing, Progressing     Problem: Oral Intake Inadequate (Acute Kidney Injury/Impairment)  Goal: Optimal Nutrition Intake  Outcome: Ongoing, Progressing     Problem: Renal Function Impairment (Acute Kidney Injury/Impairment)  Goal: Effective Renal Function  Outcome: Ongoing, Progressing     Problem: Adjustment to Illness (Heart Failure)  Goal: Optimal Coping  Outcome: Ongoing, Progressing     Problem: Cardiac Output Decreased (Heart Failure)  Goal: Optimal Cardiac Output  Outcome: Ongoing, Progressing     Problem: Dysrhythmia (Heart Failure)  Goal: Stable Heart Rate and Rhythm  Outcome: Ongoing, Progressing     Problem: Fluid Imbalance (Heart Failure)  Goal: Fluid Balance  Outcome: Ongoing, Progressing     Problem: Functional Ability Impaired (Heart Failure)  Goal: Optimal Functional Ability  Outcome: Ongoing, Progressing     Problem: Oral Intake Inadequate (Heart Failure)  Goal: Optimal Nutrition Intake  Outcome: Ongoing, Progressing     Problem: Respiratory Compromise (Heart Failure)  Goal: Effective Oxygenation and Ventilation  Outcome: Ongoing, Progressing     Problem: Sleep Disordered Breathing (Heart Failure)  Goal: Effective Breathing Pattern During Sleep  Outcome: Ongoing, Progressing      Problem: Fall Injury Risk  Goal: Absence of Fall and Fall-Related Injury  Outcome: Ongoing, Progressing

## 2024-01-25 NOTE — PHARMACY MED REC
"    Admission Medication History     The home medication history was taken by Sary Sheldon.    You may go to "Admission" then "Reconcile Home Medications" tabs to review and/or act upon these items.     The home medication list has been updated by the Pharmacy department.   Please read ALL comments highlighted in yellow.   Please address this information as you see fit.    Feel free to contact us if you have any questions or require assistance.      The medications listed below were removed from the home medication list. Please reorder if appropriate:  Patient reports no longer taking the following medication(s):  Montelukast 10 mg   Sildenafil 20 mg     Medications listed below were obtained from: Patient/family and Analytic software- [employer] Medications   Medication Sig    acetaminophen (TYLENOL ARTHRITIS ORAL) Take 2 tablets by mouth daily as needed (pain).    albuterol (VENTOLIN HFA) 90 mcg/actuation inhaler   Inhale 2 puffs into the lungs every 4 (four) hours as needed for Wheezing. Rescue    allopurinoL (ZYLOPRIM) 300 MG tablet   Take 1 tablet (300 mg total) by mouth once daily.    ascorbic acid (VITAMIN C ORAL)   Take 1 tablet by mouth once daily.    b complex vitamins tablet   Take 1 tablet by mouth once daily.    CALCIUM ORAL   Take 1 capsule by mouth once daily.    furosemide (LASIX) 40 MG tablet   Take 80 mg by mouth 2 (two) times daily.    metOLazone (ZAROXOLYN) 2.5 MG tablet   TAKE 1 TABLET(2.5 MG) BY MOUTH DAILY UNTIL YOU ARE SEEN BY PCP OR CARDIOLOGY.    metoprolol tartrate (LOPRESSOR) 25 MG tablet   TAKE 1 TABLET BY MOUTH TWICE DAILY    multivit,calc,min/FA/K1/lycop (ONE-A-DAY MEN'S COMPLETE ORAL)   Take 1 tablet by mouth once daily.    omega-3 fatty acids/fish oil (FISH OIL-OMEGA-3 FATTY ACIDS) 300-1,000 mg capsule   Take 1 capsule by mouth once daily.    pantoprazole (PROTONIX) 40 MG tablet   Take 1 tablet (40 mg total) by mouth once daily.    tiotropium (SPIRIVA) 18 mcg inhalation capsule   " Inhale 18 mcg into the lungs once daily.    warfarin (COUMADIN) 10 MG tablet Take 10 mg by mouth. Take 1 table by mouth every Sunday and 1/2 (5 mg) tablet by mouth on all other days.      WIXELA INHUB 250-50 mcg/dose diskus inhaler   1 puff 2 (two) times daily. USE 1 INHALATION BY MOUTH TWICE DAILY    polyethylene glycol 400 (VISINE DRY EYE RELIEF) 1 % Drop   Apply 2 drops to both eyesdaily as needed (dry eyes).    potassium chloride (MICRO-K) 10 MEQ CpSR Take 10 mEq by mouth 2 (two) times daily.       Sary Burfect  EXT [id number]             .

## 2024-01-25 NOTE — ASSESSMENT & PLAN NOTE
Patient is identified as having Diastolic (HFpEF) heart failure that is Acute on chronic. CHF is currently uncontrolled due to Continued edema of extremities, >3 pillow orthopnea, and Rales/crackles on pulmonary exam. CXR with underlying edema.    Echo (01/19)   Left Ventricle: The left ventricle is normal in size. Normal wall thickness. Normal wall motion. Septal flattening in systole consistent with right ventricular pressure overload. There is normal systolic function. Ejection fraction by visual approximation is 55%. There is normal diastolic function.    Right Ventricle: Severe right ventricular enlargement. Wall thickness is normal. Right ventricle wall motion has global hypokinesis. Systolic function is severely reduced.    Tricuspid Valve: There is moderate to severe regurgitation.    Pulmonary Artery: There is moderate to severe pulmonary hypertension. The estimated pulmonary artery systolic pressure is 67 mmHg.    IVC/SVC: Intermediate venous pressure at 8 mmHg.    Pericardium: There is a trivial effusion.    Recent Labs   Lab 01/18/24  2216   BNP 1,073*       -Initially on Lasix 120 mg IVP TID and diuresing well, however now with gradually decreasing UOP. Transitioned to Lasix gtt at 20/hr and metolazone 10mg q.d.  -Continue Beta Blocker, rest of GDMT is precluded by AARON    -Monitor strict Is&Os and daily weights.    -Place on fluid restriction of 1.5 L.   -Low salt diet

## 2024-01-25 NOTE — ASSESSMENT & PLAN NOTE
Repeat Echo showing severe right ventricular enlargement, normal wall thickness, global hypokinesis of right ventricular wall, and systolic function is severely reduced.   - Consulted HTS

## 2024-01-25 NOTE — ASSESSMENT & PLAN NOTE
Patient with acute kidney injury/acute renal failure likely due to pre-renal azotemia due to IVVD AARON is currently improving. Baseline creatinine  1.2-1.5  - Labs reviewed- Renal function/electrolytes with Estimated Creatinine Clearance: 54.4 mL/min (A) (based on SCr of 1.9 mg/dL (H)). according to latest data. Monitor urine output and serial BMP and adjust therapy as needed. Avoid nephrotoxins and renally dose meds for GFR listed above.

## 2024-01-25 NOTE — H&P
[facility] - Cardiology Stepdown  Heart Transplant  H&P    Patient Name: Yong Mcintyre  MRN: [id number]  Admission Date: 1/18/2024  Attending Physician: Campbell Velarde, *  Primary Care Provider: Gianni Escalona MD  Principal Problem:Acute on chronic heart failure with preserved ejection fraction (HFpEF)    Subjective:     History of Present Illness:   gurinder is a 47 yo male with a PMH of HFpEF, pickwickian syndrome, HTN, pHTN, Chronic hypoxic respiratory failure (baseline oxygen req at home is 3 L), PFO (unsuccessful closure in 2006), AF (on warfarin for unknown reason) who present with worsening leg swelling and shortness of breath since 1 week. He has associated symptoms of orthopnea. He was admitted last month with similar symptoms and was discharged on lasix and metolazone and told to weigh himself daily as he may need dosage adjustment of his diuretics. Patient states he has been compliant with his medications with good urine output but has not been weighing himself. He denies any chest pain, nausea, vomiting, lightheadedness, or dizziness. Interview difficult as patient currently on Bipap.     While in the ED: Patient with oxygen saturation at 79%. Labs reveal worsened BNP >1000. New AARON with Cr 2.7 (up from baseline of 1.5). Initial VBG with pH 7.32 and CO2 83. Initially put on BIPAP but weaned off to high flow nasal cannula. ABG with A total 120 mg of IV Lasix given.        Overview/Hospital Course:  Patient was admitted to  with AHRF in setting of decompensated heart failure. Patient was initially diuresing well with 120mg TID IVP lasix and he was weaned down from 11L NC (on arrival) to 5-6L NC, however UOP started gradually decreasing. Now on lasix drip at 20 mg/hr and daily Metolazone 10mg.      HTS service consulted today in the setting of Pulmonary Hypertension and minimal improvement in oxygen requirement.   Past Medical History:   Diagnosis Date    Arthritis     CHF (congestive heart  "failure)     Diastolic dysfunction    Cor pulmonale 11/05/2012    Gallstones     Hypertension     Morbid obesity 11/05/2012    Obesity hypoventilation syndrome     On home oxygen therapy 03/07/2014    MALLIKA (obstructive sleep apnea)     BPAP 16/11 with 3 L at night (continuous O2).    Paroxysmal atrial fibrillation     PFO (patent foramen ovale) 11/05/2012    status post closure    Pickwickian syndrome 11/05/2012    Pulmonary hypertension        Past Surgical History:   Procedure Laterality Date    RIGHT HEART CATHETERIZATION Right 3/22/2022    Procedure: INSERTION, CATHETER, RIGHT HEART;  Surgeon: Tony Martínez MD;  Location: [facility] CATH LAB;  Service: Cardiology;  Laterality: Right;       Review of patient's allergies indicates:   Allergen Reactions    Lipitor [atorvastatin] Other (See Comments)     "it makes my legs feel like jelly"  Other reaction(s): causes legs to hurt       Current Facility-Administered Medications   Medication    albuterol inhaler 2 puff    fluticasone furoate-vilanteroL 100-25 mcg/dose diskus inhaler 1 puff    furosemide (LASIX) 500 mg in 50 mL infusion (conc: 10 mg/mL)    melatonin tablet 6 mg    metOLazone tablet 10 mg    metoprolol tartrate (LOPRESSOR) tablet 25 mg    montelukast tablet 10 mg    ondansetron disintegrating tablet 4 mg    pantoprazole EC tablet 40 mg    sodium chloride 0.9% flush 10 mL    tiotropium bromide 2.5 mcg/actuation inhaler 2 puff    [START ON 1/26/2024] warfarin (COUMADIN) tablet 5 mg    warfarin (COUMADIN) tablet 7.5 mg     Family History       Problem Relation (Age of Onset)    Arthritis Mother, Maternal Grandmother, Maternal Grandfather    Asthma Mother, Sister, Maternal Grandmother    Breast cancer Paternal Grandmother    Diabetes Maternal Grandmother    Down syndrome Brother    Gout Brother    Hypertension Father, Maternal Grandmother, Maternal Grandfather, Paternal Grandfather          Tobacco Use    Smoking status: Never    Smokeless tobacco: Never " "  Substance and Sexual Activity    Alcohol use: Yes     Comment: holidays  rare    Drug use: No    Sexual activity: Not Currently     Partners: Female     Review of Systems  Objective:     Vital Signs (Most Recent):  Temp: 97.3 °F (36.3 °C) (01/25/24 1153)  Pulse: 94 (01/25/24 1508)  Resp: 18 (01/25/24 1153)  BP: (!) 104/56 (01/25/24 1153)  SpO2: (!) 94 % (01/25/24 1153) Vital Signs (24h Range):  Temp:  [97.3 °F (36.3 °C)-98.3 °F (36.8 °C)] 97.3 °F (36.3 °C)  Pulse:  [] 94  Resp:  [12-20] 18  SpO2:  [90 %-97 %] 94 %  BP: ()/(55-70) 104/56     Weight: 111.6 kg (246 lb 0.5 oz)  Body mass index is 41.58 kg/m².      Intake/Output Summary (Last 24 hours) at 1/25/2024 1531  Last data filed at 1/25/2024 1410  Gross per 24 hour   Intake 838 ml   Output 5175 ml   Net -4337 ml       Physical Exam  Gen: NAD  CVS: s1s2  Pulm: bibasilar crackles  GI: soft NT  Ext: 1-2+ pitting edema b/l  Significant Labs:  CBC:  Recent Labs   Lab 01/25/24 0447   WBC 7.50   RBC 6.42*   HGB 16.0   HCT 55.6*      MCV 87   MCH 24.9*   MCHC 28.8*     BNP:  No results for input(s): "BNP" in the last 72 hours.    Invalid input(s): "BNPTRIAGELBLO"  CMP:  Recent Labs   Lab 01/25/24 0447 01/25/24  1358   GLU 84 106   CALCIUM 9.8 9.4   ALBUMIN 2.9*  --    PROT 8.4  --     139   K 4.1 3.8   CO2 40* 47*   CL 86* 85*   BUN 73* 70*   CREATININE 1.9* 2.2*   ALKPHOS 122  --    ALT 16  --    AST 24  --    BILITOT 1.9*  --       Coagulation:   Recent Labs   Lab 01/25/24  0447   INR 1.7*     LDH:  No results for input(s): "LDH" in the last 72 hours.  Microbiology:  Microbiology Results (last 7 days)       Procedure Component Value Units Date/Time    Influenza A & B by Molecular [[id number]] Collected: 01/18/24 2220    Order Status: Completed Specimen: Nasopharyngeal Swab Updated: 01/18/24 2315     Influenza A, Molecular Negative     Influenza B, Molecular Negative     Flu A & B Source Nasal swab             ABGs: No results for input(s): " ""PH", "PCO2", "HCO3", "POCSATURATED", "BE" in the last 72 hours.  BMP:   Recent Labs   Lab 01/25/24 0447 01/25/24  1358   GLU 84 106    139   K 4.1 3.8   CL 86* 85*   CO2 40* 47*   BUN 73* 70*   CREATININE 1.9* 2.2*   CALCIUM 9.8 9.4   MG 1.9  --      Cardiac Markers: No results for input(s): "CKMB", "TROPONINT", "MYOGLOBIN" in the last 72 hours.  Coagulation:   Recent Labs   Lab 01/25/24 0447   INR 1.7*     I have reviewed all pertinent labs within the past 24 hours.      Assessment/Plan:     #Acute on Chronic Hypoxic Hyercapneic respiratory failure( multifactorial- HfpEF/pulm htn/OHS )- on 6 l oxygen( 3l baseline at home)  #AARON on CKD prerenal s/t volume overload.   #Moderate Pulmonary Hypertension( ?Grp 1 and 2)  #PFO with unsuccessful closure 2006- suspect ?worsening shuntin the setting of volume overload and HfpEF exacerbation.  #P. Afib on Coumadin for unknown reason. Currently in NSR  #CKD  #MALLIKA/OHS    RHC 2022 : RA 5 , PCWP 25, PA 58/28, CI 2.8. PVR 2.63.   TTE 1/19/24: severe RV enlargement, Severely reduced RV function, LVEF >55%, moderate to severe TR, PASP 67 mm hg.     Recommendations:  -pt appears volume overloaded and improving on lasix 20/hr and metolazone 10 daily. Continue diuresis for now.   - will need a RHC eventually once euvolemic.  - on Coumadin for afib. Currently in NSR. INR 1.7  - not taking Bosentan and sildenefil per patient as it was DC'd in November. Follows with [facility] pulmonology. Will try to obtain records regarding Pulmonary Hypertension Rx plan.  -continue Toprol 25 bid.       Discussed with Dr. Red.       Bj Lamb MD  Heart Transplant  [facility] - Cardiology Stepdown  "

## 2024-01-26 LAB
ALBUMIN SERPL BCP-MCNC: 2.8 G/DL (ref 3.5–5.2)
ALP SERPL-CCNC: 112 U/L (ref 55–135)
ALT SERPL W/O P-5'-P-CCNC: 12 U/L (ref 10–44)
ANION GAP SERPL CALC-SCNC: 10 MMOL/L (ref 8–16)
ANION GAP SERPL CALC-SCNC: 15 MMOL/L (ref 8–16)
AST SERPL-CCNC: 19 U/L (ref 10–40)
BILIRUB SERPL-MCNC: 1.7 MG/DL (ref 0.1–1)
BUN SERPL-MCNC: 75 MG/DL (ref 6–20)
BUN SERPL-MCNC: 75 MG/DL (ref 6–20)
CALCIUM SERPL-MCNC: 9.7 MG/DL (ref 8.7–10.5)
CALCIUM SERPL-MCNC: 9.9 MG/DL (ref 8.7–10.5)
CHLORIDE SERPL-SCNC: 84 MMOL/L (ref 95–110)
CHLORIDE SERPL-SCNC: 87 MMOL/L (ref 95–110)
CO2 SERPL-SCNC: 40 MMOL/L (ref 23–29)
CO2 SERPL-SCNC: 43 MMOL/L (ref 23–29)
CREAT SERPL-MCNC: 2 MG/DL (ref 0.5–1.4)
CREAT SERPL-MCNC: 2 MG/DL (ref 0.5–1.4)
EST. GFR  (NO RACE VARIABLE): 40.4 ML/MIN/1.73 M^2
EST. GFR  (NO RACE VARIABLE): 40.4 ML/MIN/1.73 M^2
GLUCOSE SERPL-MCNC: 114 MG/DL (ref 70–110)
GLUCOSE SERPL-MCNC: 67 MG/DL (ref 70–110)
INR PPP: 1.7 (ref 0.8–1.2)
MAGNESIUM SERPL-MCNC: 1.6 MG/DL (ref 1.6–2.6)
PHOSPHATE SERPL-MCNC: 3.9 MG/DL (ref 2.7–4.5)
POTASSIUM SERPL-SCNC: 3.3 MMOL/L (ref 3.5–5.1)
POTASSIUM SERPL-SCNC: 3.5 MMOL/L (ref 3.5–5.1)
PROT SERPL-MCNC: 8.2 G/DL (ref 6–8.4)
PROTHROMBIN TIME: 18.1 SEC (ref 9–12.5)
SODIUM SERPL-SCNC: 139 MMOL/L (ref 136–145)
SODIUM SERPL-SCNC: 140 MMOL/L (ref 136–145)

## 2024-01-26 PROCEDURE — 85610 PROTHROMBIN TIME: CPT | Mod: HCNC

## 2024-01-26 PROCEDURE — 94761 N-INVAS EAR/PLS OXIMETRY MLT: CPT | Mod: HCNC

## 2024-01-26 PROCEDURE — 25000003 PHARM REV CODE 250: Mod: HCNC | Performed by: STUDENT IN AN ORGANIZED HEALTH CARE EDUCATION/TRAINING PROGRAM

## 2024-01-26 PROCEDURE — 94640 AIRWAY INHALATION TREATMENT: CPT | Mod: HCNC

## 2024-01-26 PROCEDURE — 83735 ASSAY OF MAGNESIUM: CPT | Mod: HCNC

## 2024-01-26 PROCEDURE — 20600001 HC STEP DOWN PRIVATE ROOM: Mod: HCNC

## 2024-01-26 PROCEDURE — 94660 CPAP INITIATION&MGMT: CPT | Mod: HCNC

## 2024-01-26 PROCEDURE — 80048 BASIC METABOLIC PNL TOTAL CA: CPT | Mod: HCNC,XB

## 2024-01-26 PROCEDURE — 84100 ASSAY OF PHOSPHORUS: CPT | Mod: HCNC

## 2024-01-26 PROCEDURE — 25000003 PHARM REV CODE 250: Mod: HCNC

## 2024-01-26 PROCEDURE — 94799 UNLISTED PULMONARY SVC/PX: CPT | Mod: HCNC

## 2024-01-26 PROCEDURE — 27100171 HC OXYGEN HIGH FLOW UP TO 24 HOURS: Mod: HCNC

## 2024-01-26 PROCEDURE — 63600175 PHARM REV CODE 636 W HCPCS: Mod: HCNC

## 2024-01-26 PROCEDURE — 36415 COLL VENOUS BLD VENIPUNCTURE: CPT | Mod: HCNC,XB

## 2024-01-26 PROCEDURE — 99233 SBSQ HOSP IP/OBS HIGH 50: CPT | Mod: HCNC,GC,, | Performed by: INTERNAL MEDICINE

## 2024-01-26 PROCEDURE — 80053 COMPREHEN METABOLIC PANEL: CPT | Mod: HCNC

## 2024-01-26 PROCEDURE — 99900035 HC TECH TIME PER 15 MIN (STAT): Mod: HCNC

## 2024-01-26 RX ORDER — POTASSIUM CHLORIDE 20 MEQ/1
40 TABLET, EXTENDED RELEASE ORAL
Status: DISCONTINUED | OUTPATIENT
Start: 2024-01-26 | End: 2024-01-26

## 2024-01-26 RX ORDER — POTASSIUM CHLORIDE 20 MEQ/1
40 TABLET, EXTENDED RELEASE ORAL
Status: COMPLETED | OUTPATIENT
Start: 2024-01-26 | End: 2024-01-26

## 2024-01-26 RX ORDER — MAGNESIUM SULFATE HEPTAHYDRATE 40 MG/ML
2 INJECTION, SOLUTION INTRAVENOUS ONCE
Status: COMPLETED | OUTPATIENT
Start: 2024-01-26 | End: 2024-01-26

## 2024-01-26 RX ADMIN — MAGNESIUM SULFATE HEPTAHYDRATE 2 G: 40 INJECTION, SOLUTION INTRAVENOUS at 09:01

## 2024-01-26 RX ADMIN — TIOTROPIUM BROMIDE INHALATION SPRAY 2 PUFF: 3.12 SPRAY, METERED RESPIRATORY (INHALATION) at 09:01

## 2024-01-26 RX ADMIN — POTASSIUM CHLORIDE 40 MEQ: 1500 TABLET, EXTENDED RELEASE ORAL at 09:01

## 2024-01-26 RX ADMIN — POTASSIUM CHLORIDE 40 MEQ: 1500 TABLET, EXTENDED RELEASE ORAL at 08:01

## 2024-01-26 RX ADMIN — FUROSEMIDE 20 MG/HR: 10 INJECTION, SOLUTION INTRAMUSCULAR; INTRAVENOUS at 08:01

## 2024-01-26 RX ADMIN — METOPROLOL TARTRATE 25 MG: 25 TABLET, FILM COATED ORAL at 08:01

## 2024-01-26 RX ADMIN — METOLAZONE 10 MG: 5 TABLET ORAL at 08:01

## 2024-01-26 RX ADMIN — FLUTICASONE FUROATE AND VILANTEROL TRIFENATATE 1 PUFF: 100; 25 POWDER RESPIRATORY (INHALATION) at 09:01

## 2024-01-26 RX ADMIN — WARFARIN SODIUM 5 MG: 5 TABLET ORAL at 05:01

## 2024-01-26 RX ADMIN — METOPROLOL TARTRATE 25 MG: 25 TABLET, FILM COATED ORAL at 09:01

## 2024-01-26 RX ADMIN — POTASSIUM BICARBONATE 40 MEQ: 391 TABLET, EFFERVESCENT ORAL at 09:01

## 2024-01-26 RX ADMIN — PANTOPRAZOLE SODIUM 40 MG: 40 TABLET, DELAYED RELEASE ORAL at 08:01

## 2024-01-26 RX ADMIN — MONTELUKAST 10 MG: 10 TABLET, FILM COATED ORAL at 09:01

## 2024-01-26 NOTE — ASSESSMENT & PLAN NOTE
Patient is identified as having Diastolic (HFpEF) heart failure that is Acute on chronic. CHF is currently uncontrolled due to Continued edema of extremities, >3 pillow orthopnea, and Rales/crackles on pulmonary exam. CXR with underlying edema.    Echo (01/19)   Left Ventricle: The left ventricle is normal in size. Normal wall thickness. Normal wall motion. Septal flattening in systole consistent with right ventricular pressure overload. There is normal systolic function. Ejection fraction by visual approximation is 55%. There is normal diastolic function.    Right Ventricle: Severe right ventricular enlargement. Wall thickness is normal. Right ventricle wall motion has global hypokinesis. Systolic function is severely reduced.    Tricuspid Valve: There is moderate to severe regurgitation.    Pulmonary Artery: There is moderate to severe pulmonary hypertension. The estimated pulmonary artery systolic pressure is 67 mmHg.    IVC/SVC: Intermediate venous pressure at 8 mmHg.    Pericardium: There is a trivial effusion.    Recent Labs   Lab 01/18/24  2216   BNP 1,073*       -Initially on Lasix 120 mg IVP TID and diuresing well, however now with gradually decreasing UOP. Transitioned to Lasix gtt and metolazone 10mg q.d.  - Decreased Lasix gtt to 10 cc/hr  - Tentative plan for RHC on Monday if patient appears euvolemic  -Continue Beta Blocker, rest of GDMT is precluded by AARON    -Monitor strict Is&Os and daily weights.    -Place on fluid restriction of 1.5 L.   -Low salt diet

## 2024-01-26 NOTE — PLAN OF CARE
Plan of care reviewed with patient. Patient is AOX4 and VS stable. Patient remained free of falls and trauma, fall precautions are in place. Patient is ambulating independently. Patient has no complaints of pain. Lasix gtt decreased to 1 mL/hr and 10 mg/hr. Patient has no questions at this time. Wheels are locked and the bed is in lowest position. The call bell is within reach. Telemetry is on.    [Consultation] : consultation for [Hearing Aid] : hearing aid

## 2024-01-26 NOTE — SUBJECTIVE & OBJECTIVE
Interval History: NAEON. AFVSS. Patient reports feeling similar to previous days but reports subjective improvement in swelling. Continuing to diurese well with Lasix gtt and Metolazone with Net negative UOP of 3.6 L. HTS following and recommending a RHC once euvolemic, likely on Monday. Will decrease Lasix gtt to 10 cc/hr due to high urine output    Review of Systems  Objective:     Vital Signs (Most Recent):  Temp: 98.3 °F (36.8 °C) (01/26/24 1200)  Pulse: 89 (01/26/24 1400)  Resp: 20 (01/26/24 1200)  BP: 108/63 (01/26/24 1200)  SpO2: (!) 94 % (01/26/24 1200) Vital Signs (24h Range):  Temp:  [96.9 °F (36.1 °C)-98.6 °F (37 °C)] 98.3 °F (36.8 °C)  Pulse:  [] 89  Resp:  [18-20] 20  SpO2:  [91 %-95 %] 94 %  BP: ()/(56-65) 108/63     Weight: 108.7 kg (239 lb 10.2 oz)  Body mass index is 40.5 kg/m².    Intake/Output Summary (Last 24 hours) at 1/26/2024 1432  Last data filed at 1/26/2024 1333  Gross per 24 hour   Intake 1162 ml   Output 3800 ml   Net -2638 ml         Physical Exam  Constitutional:       General: He is not in acute distress.     Appearance: Normal appearance. He is not ill-appearing.   HENT:      Head: Normocephalic and atraumatic.      Nose: Nose normal.      Mouth/Throat:      Mouth: Mucous membranes are moist.   Eyes:      Extraocular Movements: Extraocular movements intact.      Conjunctiva/sclera: Conjunctivae normal.      Pupils: Pupils are equal, round, and reactive to light.   Cardiovascular:      Rate and Rhythm: Normal rate and regular rhythm.      Pulses: Normal pulses.      Heart sounds: Normal heart sounds. No murmur heard.  Pulmonary:      Effort: Pulmonary effort is normal. No respiratory distress.      Breath sounds: Rales present. No wheezing or rhonchi.      Comments: Currently on 6L NC; crackles to bilateral lung bases  Abdominal:      General: Abdomen is flat. Bowel sounds are normal.      Palpations: Abdomen is soft.   Musculoskeletal:         General: Swelling present.       Right lower leg: Edema present.      Left lower leg: Edema present.   Skin:     General: Skin is warm and dry.      Capillary Refill: Capillary refill takes less than 2 seconds.   Neurological:      Mental Status: He is alert and oriented to person, place, and time.   Psychiatric:         Mood and Affect: Mood normal.         Behavior: Behavior normal.         Thought Content: Thought content normal.         Judgment: Judgment normal.             Significant Labs: All pertinent labs within the past 24 hours have been reviewed.    Significant Imaging: I have reviewed all pertinent imaging results/findings within the past 24 hours.

## 2024-01-26 NOTE — PROGRESS NOTES
"[facility]  Heart Transplant  Progress Note  Patient Name: Yong Mcintyre  MRN: [id number]  Admission Date: 1/18/2024  Attending Physician: Campbell Velarde, *  Primary Care Provider: Gianni Escalona MD  Principal Problem:Acute on chronic heart failure with preserved ejection fraction (HFpEF)    Subjective:     Interval History:  NAEON. Continue lasix drip at 10/hr( from 20/hr).Likely RHC on Monday. Please keep NPO Sunday midnight.         Overview/Hospital Course:  Patient was admitted to  with AHRF in setting of decompensated heart failure and Pulmonary Hypertension. Patient was initially diuresing well with 120mg TID IVP lasix and he was weaned down from 11L NC (on arrival) to 5-6L NC( at home uses  3 L oxygen at baseline), however UOP started gradually decreasing. on lasix drip at 20 mg/hr and daily Metolazone 10mg with good UOP.      HTS service consulted 1/25/24 in the setting of Pulmonary Hypertension and minimal improvement in oxygen requirement.   Past Medical History:   Diagnosis Date    Arthritis     CHF (congestive heart failure)     Diastolic dysfunction    Cor pulmonale 11/05/2012    Gallstones     Hypertension     Morbid obesity 11/05/2012    Obesity hypoventilation syndrome     On home oxygen therapy 03/07/2014    MALLIKA (obstructive sleep apnea)     BPAP 16/11 with 3 L at night (continuous O2).    Paroxysmal atrial fibrillation     PFO (patent foramen ovale) 11/05/2012    status post closure    Pickwickian syndrome 11/05/2012    Pulmonary hypertension        Past Surgical History:   Procedure Laterality Date    RIGHT HEART CATHETERIZATION Right 3/22/2022    Procedure: INSERTION, CATHETER, RIGHT HEART;  Surgeon: Tony Martínez MD;  Location: [facility] CATH LAB;  Service: Cardiology;  Laterality: Right;       Review of patient's allergies indicates:   Allergen Reactions    Lipitor [atorvastatin] Other (See Comments)     "it makes my legs feel like jelly"  Other reaction(s): " causes legs to hurt       Current Facility-Administered Medications   Medication    albuterol inhaler 2 puff    fluticasone furoate-vilanteroL 100-25 mcg/dose diskus inhaler 1 puff    furosemide (LASIX) 500 mg in 50 mL infusion (conc: 10 mg/mL)    melatonin tablet 6 mg    metOLazone tablet 10 mg    metoprolol tartrate (LOPRESSOR) tablet 25 mg    montelukast tablet 10 mg    ondansetron disintegrating tablet 4 mg    pantoprazole EC tablet 40 mg    sodium chloride 0.9% flush 10 mL    tiotropium bromide 2.5 mcg/actuation inhaler 2 puff    warfarin (COUMADIN) tablet 5 mg     Family History       Problem Relation (Age of Onset)    Arthritis Mother, Maternal Grandmother, Maternal Grandfather    Asthma Mother, Sister, Maternal Grandmother    Breast cancer Paternal Grandmother    Diabetes Maternal Grandmother    Down syndrome Brother    Gout Brother    Hypertension Father, Maternal Grandmother, Maternal Grandfather, Paternal Grandfather          Tobacco Use    Smoking status: Never    Smokeless tobacco: Never   Substance and Sexual Activity    Alcohol use: Yes     Comment: holidays  rare    Drug use: No    Sexual activity: Not Currently     Partners: Female     Review of Systems  Objective:     Vital Signs (Most Recent):  Temp: 98.2 °F (36.8 °C) (01/26/24 0803)  Pulse: 90 (01/26/24 1119)  Resp: 18 (01/26/24 0902)  BP: 106/62 (01/26/24 0803)  SpO2: (!) 94 % (01/26/24 0803) Vital Signs (24h Range):  Temp:  [96.9 °F (36.1 °C)-98.6 °F (37 °C)] 98.2 °F (36.8 °C)  Pulse:  [] 90  Resp:  [18-20] 18  SpO2:  [91 %-95 %] 94 %  BP: ()/(56-65) 106/62     Weight: 108.7 kg (239 lb 10.2 oz)  Body mass index is 40.5 kg/m².      Intake/Output Summary (Last 24 hours) at 1/26/2024 1152  Last data filed at 1/26/2024 0911  Gross per 24 hour   Intake 1060 ml   Output 3650 ml   Net -2590 ml         Physical Exam  Gen: NAD  CVS: s1s2. JVD+  Pulm: bibasilar crackles  GI: soft NT  Ext: 1-2+ pitting edema b/l  Significant  "Labs:  CBC:  Recent Labs   Lab 01/25/24  0447   WBC 7.50   RBC 6.42*   HGB 16.0   HCT 55.6*      MCV 87   MCH 24.9*   MCHC 28.8*       BNP:  No results for input(s): "BNP" in the last 72 hours.    Invalid input(s): "BNPTRIAGELBLO"  CMP:  Recent Labs   Lab 01/26/24  0446   GLU 67*   CALCIUM 9.9   ALBUMIN 2.8*   PROT 8.2      K 3.3*   CO2 40*   CL 84*   BUN 75*   CREATININE 2.0*   ALKPHOS 112   ALT 12   AST 19   BILITOT 1.7*        Coagulation:   Recent Labs   Lab 01/26/24  0446   INR 1.7*       LDH:  No results for input(s): "LDH" in the last 72 hours.  Microbiology:  Microbiology Results (last 7 days)       ** No results found for the last 168 hours. **             ABGs: No results for input(s): "PH", "PCO2", "HCO3", "POCSATURATED", "BE" in the last 72 hours.  BMP:   Recent Labs   Lab 01/26/24  0446   GLU 67*      K 3.3*   CL 84*   CO2 40*   BUN 75*   CREATININE 2.0*   CALCIUM 9.9   MG 1.6       Cardiac Markers: No results for input(s): "CKMB", "TROPONINT", "MYOGLOBIN" in the last 72 hours.  Coagulation:   Recent Labs   Lab 01/26/24  0446   INR 1.7*       I have reviewed all pertinent labs within the past 24 hours.      Assessment/Plan:     #Acute on Chronic Hypoxic Hyercapneic respiratory failure( multifactorial- HfpEF/pulm htn/OHS )- on 6 l oxygen( 3l baseline at home)  #AARON on CKD prerenal s/t volume overload.   #Moderate Pulmonary Hypertension( ?Grp 1 and 2)  #PFO with unsuccessful closure 2006- suspect ?worsening shuntin the setting of volume overload and HfpEF exacerbation.  #P. Afib on Coumadin for unknown reason. Currently in NSR  #CKD  #MALLIKA/OHS    [facility] 2022 : RA 5 , PCWP 25, PA 58/28, CI 2.8. PVR 2.63.   TTE 1/19/24: severe RV enlargement, Severely reduced RV function, LVEF >55%, moderate to severe TR, PASP 67 mm hg.     Recommendations:  -pt appears volume overloaded and improving on lasix 20/hr and metolazone 10 daily. Continue diuresis and wean lasix to 10/hr  - Laura C on Monday to " assess Hemodynamics and shunt evaluation.   - on Coumadin for afib. Currently in NSR. INR 1.7  - not taking Bosentan and sildenefil per patient as it was DC'd in November. Follows with [facility] pulmonology. Will try to obtain records regarding Pulmonary Hypertension Rx plan.  -continue Toprol 25 bid.     Discussed with Dr. Red.       Bj Lamb MD  Heart Transplant  [facility]

## 2024-01-26 NOTE — RESPIRATORY THERAPY
"RAPID RESPONSE RESPIRATORY THERAPY PROACTIVE ROUNDING NOTE             Time of visit: 1050     Code Status: Full Code   : 1975  Bed: 330/330 A:   MRN: [id number]  Time spent at the bedside: < 15 min    SITUATION    Evaluated patient for: HFNC Compliance     BACKGROUND    Patient has a past medical history of Arthritis, CHF (congestive heart failure), Cor pulmonale, Gallstones, Hypertension, Morbid obesity, Obesity hypoventilation syndrome, On home oxygen therapy, MALLIKA (obstructive sleep apnea), Paroxysmal atrial fibrillation, PFO (patent foramen ovale), Pickwickian syndrome, and Pulmonary hypertension.    24 Hours Vitals Range:  Temp:  [96.9 °F (36.1 °C)-98.6 °F (37 °C)]   Pulse:  []   Resp:  [18-20]   BP: ()/(56-65)   SpO2:  [91 %-95 %]     Labs:    Recent Labs     24  0505 24  1822 24  0447 24  1358 24  0446      < > 140 139 139   K 3.4*   < > 4.1 3.8 3.3*   CL 86*   < > 86* 85* 84*   CO2 41*   < > 40* 47* 40*   BUN 69*   < > 73* 70* 75*   CREATININE 2.0*   < > 1.9* 2.2* 2.0*      < > 84 106 67*   PHOS 3.9  --  4.0  --  3.9   MG 1.5*  --  1.9  --  1.6    < > = values in this interval not displayed.        No results for input(s): "PH", "PCO2", "PO2", "HCO3", "POCSATURATED", "BE" in the last 72 hours.    ASSESSMENT/INTERVENTIONS        Last VS   Temp: 98.2 °F (36.8 °C) (803)  Pulse: 90 ( 1101)  Resp: 18 ( 09)  BP: 106/62 (803)  SpO2: 94 % (803)    Level of Consciousness: Level of Consciousness (AVPU): alert  Respiratory Effort: Respiratory Effort: Unlabored, Normal Expansion/Accessory Muscle Usage: Expansion/Accessory Muscles/Retractions: no use of accessory muscles, no retractions, expansion symmetric  All Lung Field Breath Sounds: All Lung Fields Breath Sounds: Anterior:, Posterior:, clear, crackles  O2 Device/Concentration: 6L  Was the O2 device able to be weaned? No  Ambu at bedside:      Active Orders   Respiratory Care "    Bipap QHS     Frequency: QHS     Number of Occurrences: Until Specified     Order Comments: Wean as tolerated       Order Questions:      Mode Bilevel      FiO2% 50      Inspiratory pressure: 18      Expiratory pressure: 12    Oxygen Continuous     Frequency: Continuous     Number of Occurrences: Until Specified     Order Questions:      Device type: High flow      Device: High Flow Nasal Cannula (6 -15 Liters)      LPM: 6-15      Titrate O2 per Oxygen Titration Protocol: Yes      To maintain SpO2 goal of: >= 90%      Notify MD of: Inability to achieve desired SpO2; Sudden change in patient status and requires 20% increase in FiO2; Patient requires >60% FiO2    Pulse Oximetry Continuous     Frequency: Continuous     Number of Occurrences: Until Specified       RECOMMENDATIONS    We recommend: RRT Recs: Continue POC per primary team.      FOLLOW-UP    Please call back the Rapid Response RT, Soniya Corrigan RRT at x [phone] for any questions or concerns.

## 2024-01-26 NOTE — PROGRESS NOTES
[facility] Medicine  Progress Note    Patient Name: Yong Mcintyre  MRN: [id number]  Patient Class: IP- Inpatient   Admission Date: 1/18/2024  Length of Stay: 7 days  Attending Physician: Campbell Velarde, *  Primary Care Provider: Gianni Escalona MD        Subjective:     Principal Problem:Acute on chronic heart failure with preserved ejection fraction (HFpEF)        HPI:  Patient is a 47 yo male with a PMH of HFpEF, pickwickian syndrome, HTN, pHTN, Chronic hypoxic respiratory failure (baseline oxygen req at home is 3 L), PFO (unsuccessful closure in 2006), AF (on warfarin for unknown reason) who present with worsening leg swelling and shortness of breath for the past 1 week. He has associated symptoms of orthopnea. He was admitted last month with similar symptoms and was discharged on lasix and metolazone and told to weigh himself daily as he may need dosage adjustment of his diuretics. Patient states he has been compliant with his medications with good urine output but has not been weighing himself. He denies any chest pain, nausea, vomiting, lightheadedness, or dizziness. Interview difficult as patient currently on Bipap.    While in the ED: Patient with oxygen saturation at 79%. Labs reveal worsened BNP >1000. New AARON with Cr 2.7 (up from baseline of 1.5). Initial VBG with pH 7.32 and CO2 83. Initially put on BIPAP but weaned off to high flow nasal cannula. ABG with A total 120 mg of IV Lasix given.      Overview/Hospital Course:  Patient was admitted to  with AHRF in setting of decompensated heart failure. Patient was initially diuresing well with 120mg TID IVP lasix and he was weaned down from 11L NC (on arrival) to 5-6L NC, however UOP started gradually decreasing. Now on lasix drip at 20 mg/hr and daily Metolazone 10mg. May need  gtt if he continues to have difficulty diuresing.       Interval History: NAEON. AFVSS. Patient reports feeling similar to previous days  but reports subjective improvement in swelling. Continuing to diurese well with Lasix gtt and Metolazone with Net negative UOP of 3.6 L. HTS following and recommending a RHC once euvolemic, likely on Monday. Will decrease Lasix gtt to 10 mg/hr due to high urine output    Review of Systems  Objective:     Vital Signs (Most Recent):  Temp: 98.3 °F (36.8 °C) (01/26/24 1200)  Pulse: 89 (01/26/24 1400)  Resp: 20 (01/26/24 1200)  BP: 108/63 (01/26/24 1200)  SpO2: (!) 94 % (01/26/24 1200) Vital Signs (24h Range):  Temp:  [96.9 °F (36.1 °C)-98.6 °F (37 °C)] 98.3 °F (36.8 °C)  Pulse:  [] 89  Resp:  [18-20] 20  SpO2:  [91 %-95 %] 94 %  BP: ()/(56-65) 108/63     Weight: 108.7 kg (239 lb 10.2 oz)  Body mass index is 40.5 kg/m².    Intake/Output Summary (Last 24 hours) at 1/26/2024 1432  Last data filed at 1/26/2024 1333  Gross per 24 hour   Intake 1162 ml   Output 3800 ml   Net -2638 ml         Physical Exam  Constitutional:       General: He is not in acute distress.     Appearance: Normal appearance. He is not ill-appearing.   HENT:      Head: Normocephalic and atraumatic.      Nose: Nose normal.      Mouth/Throat:      Mouth: Mucous membranes are moist.   Eyes:      Extraocular Movements: Extraocular movements intact.      Conjunctiva/sclera: Conjunctivae normal.      Pupils: Pupils are equal, round, and reactive to light.   Cardiovascular:      Rate and Rhythm: Normal rate and regular rhythm.      Pulses: Normal pulses.      Heart sounds: Normal heart sounds. No murmur heard.  Pulmonary:      Effort: Pulmonary effort is normal. No respiratory distress.      Breath sounds: Rales present. No wheezing or rhonchi.      Comments: Currently on 6L NC; crackles to bilateral lung bases  Abdominal:      General: Abdomen is flat. Bowel sounds are normal.      Palpations: Abdomen is soft.   Musculoskeletal:         General: Swelling present.      Right lower leg: Edema present.      Left lower leg: Edema present.   Skin:      General: Skin is warm and dry.      Capillary Refill: Capillary refill takes less than 2 seconds.   Neurological:      Mental Status: He is alert and oriented to person, place, and time.   Psychiatric:         Mood and Affect: Mood normal.         Behavior: Behavior normal.         Thought Content: Thought content normal.         Judgment: Judgment normal.             Significant Labs: All pertinent labs within the past 24 hours have been reviewed.    Significant Imaging: I have reviewed all pertinent imaging results/findings within the past 24 hours.    Assessment/Plan:      * Acute on chronic heart failure with preserved ejection fraction (HFpEF)  Patient is identified as having Diastolic (HFpEF) heart failure that is Acute on chronic. CHF is currently uncontrolled due to Continued edema of extremities, >3 pillow orthopnea, and Rales/crackles on pulmonary exam. CXR with underlying edema.    Echo (01/19)   Left Ventricle: The left ventricle is normal in size. Normal wall thickness. Normal wall motion. Septal flattening in systole consistent with right ventricular pressure overload. There is normal systolic function. Ejection fraction by visual approximation is 55%. There is normal diastolic function.    Right Ventricle: Severe right ventricular enlargement. Wall thickness is normal. Right ventricle wall motion has global hypokinesis. Systolic function is severely reduced.    Tricuspid Valve: There is moderate to severe regurgitation.    Pulmonary Artery: There is moderate to severe pulmonary hypertension. The estimated pulmonary artery systolic pressure is 67 mmHg.    IVC/SVC: Intermediate venous pressure at 8 mmHg.    Pericardium: There is a trivial effusion.    Recent Labs   Lab 01/18/24  2216   BNP 1,073*       -Initially on Lasix 120 mg IVP TID and diuresing well, however now with gradually decreasing UOP. Transitioned to Lasix gtt and metolazone 10mg q.d.  - Decreased Lasix gtt to 10 cc/hr  - Tentative plan  for RHC on Monday if patient appears euvolemic  -Continue Beta Blocker, rest of GDMT is precluded by AARON    -Monitor strict Is&Os and daily weights.    -Place on fluid restriction of 1.5 L.   -Low salt diet     Chronic right-sided heart failure  Repeat Echo showing severe right ventricular enlargement, normal wall thickness, global hypokinesis of right ventricular wall, and systolic function is severely reduced.   - Consulted HTS      Epigastric abdominal pain  Described as bloating.  -Has follow up with GI on February 2nd  -Patient reports improvement following diuresis; likely related to gut edema with CHF exacerbation      Chronic asthmatic bronchitis  - Continue home inhaler alternatives       Acute kidney injury superimposed on CKD  Patient with acute kidney injury/acute renal failure likely due to pre-renal azotemia due to IVVD AARON is currently improving. Baseline creatinine  1.2-1.5  - Labs reviewed- Renal function/electrolytes with Estimated Creatinine Clearance: 54.4 mL/min (A) (based on SCr of 1.9 mg/dL (H)). according to latest data. Monitor urine output and serial BMP and adjust therapy as needed. Avoid nephrotoxins and renally dose meds for GFR listed above.    Acute on chronic respiratory failure with hypoxia and hypercapnia  Patient with Hypercapnic and Hypoxic Respiratory failure which is Acute on chronic.  he is on home oxygen at 3 LPM. CXR with underlying edema.    Likely related to CHF exacerbation. Patient is a chronic CO2 retainer with Pickwickian syndrome.    -Diuresis with Lasix gtt and Metolazone  -Continuous oxygen and pulse ox  -BiPAP QHS      MALLIKA (obstructive sleep apnea)  -BiPAP QHS      PFO (patent foramen ovale)  Thought to be cause of patient's structural heart disease.      Pulmonary hypertension  Managed by pulmonology with [facility] with close follow ups  Sildenafil recently discontinued  Uses 3L NC at baseline  Continue Warfarin. Daily PT/INR  Consulted HTS due to minimal improvement in  O2 requirements following intense diuresis    Pickwickian syndrome  Body mass index is 41.58 kg/m². Morbid obesity complicates all aspects of disease management from diagnostic modalities to treatment. Weight loss encouraged and health benefits explained to patient.     - BiPAP QHS as tolerated         VTE Risk Mitigation (From admission, onward)           Ordered     warfarin (COUMADIN) tablet 5 mg  Daily         01/25/24 1007     IP VTE HIGH RISK PATIENT  Once         01/19/24 0208     Place sequential compression device  Until discontinued         01/19/24 0208                    Discharge Planning   ESTEBAN: 1/29/2024     Code Status: Full Code   Is the patient medically ready for discharge?: No    Reason for patient still in hospital (select all that apply): Patient unstable, Patient trending condition, and Treatment  Discharge Plan A: Home                  Andre Calvo DO  Department of Hospital Medicine   [facility] - [facility]

## 2024-01-26 NOTE — RESPIRATORY THERAPY
RAPID RESPONSE RESPIRATORY CHART CHECK       Chart check completed.  Please call [phone] for further concerns or assistance.

## 2024-01-27 PROBLEM — R10.13 EPIGASTRIC ABDOMINAL PAIN: Status: RESOLVED | Noted: 2023-12-11 | Resolved: 2024-01-27

## 2024-01-27 LAB
ALBUMIN SERPL BCP-MCNC: 2.7 G/DL (ref 3.5–5.2)
ALP SERPL-CCNC: 112 U/L (ref 55–135)
ALT SERPL W/O P-5'-P-CCNC: 13 U/L (ref 10–44)
ANION GAP SERPL CALC-SCNC: 13 MMOL/L (ref 8–16)
ANION GAP SERPL CALC-SCNC: 14 MMOL/L (ref 8–16)
AST SERPL-CCNC: 18 U/L (ref 10–40)
BILIRUB SERPL-MCNC: 1.5 MG/DL (ref 0.1–1)
BUN SERPL-MCNC: 74 MG/DL (ref 6–20)
BUN SERPL-MCNC: 79 MG/DL (ref 6–20)
CALCIUM SERPL-MCNC: 10.8 MG/DL (ref 8.7–10.5)
CALCIUM SERPL-MCNC: 9.6 MG/DL (ref 8.7–10.5)
CHLORIDE SERPL-SCNC: 84 MMOL/L (ref 95–110)
CHLORIDE SERPL-SCNC: 85 MMOL/L (ref 95–110)
CO2 SERPL-SCNC: 39 MMOL/L (ref 23–29)
CO2 SERPL-SCNC: 43 MMOL/L (ref 23–29)
CREAT SERPL-MCNC: 2.1 MG/DL (ref 0.5–1.4)
CREAT SERPL-MCNC: 2.3 MG/DL (ref 0.5–1.4)
EST. GFR  (NO RACE VARIABLE): 34.2 ML/MIN/1.73 M^2
EST. GFR  (NO RACE VARIABLE): 38.1 ML/MIN/1.73 M^2
GLUCOSE SERPL-MCNC: 87 MG/DL (ref 70–110)
GLUCOSE SERPL-MCNC: 91 MG/DL (ref 70–110)
INR PPP: 2.2 (ref 0.8–1.2)
MAGNESIUM SERPL-MCNC: 2.2 MG/DL (ref 1.6–2.6)
PHOSPHATE SERPL-MCNC: 4.2 MG/DL (ref 2.7–4.5)
POTASSIUM SERPL-SCNC: 3.5 MMOL/L (ref 3.5–5.1)
POTASSIUM SERPL-SCNC: 3.6 MMOL/L (ref 3.5–5.1)
PROT SERPL-MCNC: 8 G/DL (ref 6–8.4)
PROTHROMBIN TIME: 22.2 SEC (ref 9–12.5)
SODIUM SERPL-SCNC: 137 MMOL/L (ref 136–145)
SODIUM SERPL-SCNC: 141 MMOL/L (ref 136–145)

## 2024-01-27 PROCEDURE — 83735 ASSAY OF MAGNESIUM: CPT | Mod: HCNC

## 2024-01-27 PROCEDURE — 85610 PROTHROMBIN TIME: CPT | Mod: HCNC

## 2024-01-27 PROCEDURE — 80053 COMPREHEN METABOLIC PANEL: CPT | Mod: HCNC

## 2024-01-27 PROCEDURE — 80048 BASIC METABOLIC PNL TOTAL CA: CPT | Mod: HCNC,XB

## 2024-01-27 PROCEDURE — 25000003 PHARM REV CODE 250: Mod: HCNC

## 2024-01-27 PROCEDURE — 94761 N-INVAS EAR/PLS OXIMETRY MLT: CPT | Mod: HCNC

## 2024-01-27 PROCEDURE — 63600175 PHARM REV CODE 636 W HCPCS: Mod: HCNC

## 2024-01-27 PROCEDURE — 20600001 HC STEP DOWN PRIVATE ROOM: Mod: HCNC

## 2024-01-27 PROCEDURE — 27100171 HC OXYGEN HIGH FLOW UP TO 24 HOURS: Mod: HCNC

## 2024-01-27 PROCEDURE — 99900035 HC TECH TIME PER 15 MIN (STAT): Mod: HCNC

## 2024-01-27 PROCEDURE — 25000003 PHARM REV CODE 250: Mod: HCNC | Performed by: STUDENT IN AN ORGANIZED HEALTH CARE EDUCATION/TRAINING PROGRAM

## 2024-01-27 PROCEDURE — 94640 AIRWAY INHALATION TREATMENT: CPT | Mod: HCNC

## 2024-01-27 PROCEDURE — 36415 COLL VENOUS BLD VENIPUNCTURE: CPT | Mod: HCNC,XB

## 2024-01-27 PROCEDURE — 94799 UNLISTED PULMONARY SVC/PX: CPT | Mod: HCNC

## 2024-01-27 PROCEDURE — 94660 CPAP INITIATION&MGMT: CPT | Mod: HCNC

## 2024-01-27 PROCEDURE — 84100 ASSAY OF PHOSPHORUS: CPT | Mod: HCNC

## 2024-01-27 RX ORDER — POTASSIUM CHLORIDE 750 MG/1
10 CAPSULE, EXTENDED RELEASE ORAL ONCE
Status: COMPLETED | OUTPATIENT
Start: 2024-01-27 | End: 2024-01-27

## 2024-01-27 RX ORDER — POTASSIUM CHLORIDE 750 MG/1
30 CAPSULE, EXTENDED RELEASE ORAL
Status: COMPLETED | OUTPATIENT
Start: 2024-01-27 | End: 2024-01-27

## 2024-01-27 RX ORDER — POTASSIUM CHLORIDE 20 MEQ/1
40 TABLET, EXTENDED RELEASE ORAL
Status: COMPLETED | OUTPATIENT
Start: 2024-01-27 | End: 2024-01-27

## 2024-01-27 RX ADMIN — WARFARIN SODIUM 5 MG: 5 TABLET ORAL at 04:01

## 2024-01-27 RX ADMIN — METOLAZONE 10 MG: 5 TABLET ORAL at 09:01

## 2024-01-27 RX ADMIN — POTASSIUM CHLORIDE 30 MEQ: 10 CAPSULE, COATED, EXTENDED RELEASE ORAL at 06:01

## 2024-01-27 RX ADMIN — FUROSEMIDE 10 MG/HR: 10 INJECTION, SOLUTION INTRAMUSCULAR; INTRAVENOUS at 09:01

## 2024-01-27 RX ADMIN — POTASSIUM CHLORIDE 30 MEQ: 10 CAPSULE, COATED, EXTENDED RELEASE ORAL at 04:01

## 2024-01-27 RX ADMIN — POTASSIUM CHLORIDE 40 MEQ: 1500 TABLET, EXTENDED RELEASE ORAL at 08:01

## 2024-01-27 RX ADMIN — PANTOPRAZOLE SODIUM 40 MG: 40 TABLET, DELAYED RELEASE ORAL at 09:01

## 2024-01-27 RX ADMIN — POTASSIUM CHLORIDE 10 MEQ: 10 CAPSULE, COATED, EXTENDED RELEASE ORAL at 09:01

## 2024-01-27 RX ADMIN — POTASSIUM CHLORIDE 40 MEQ: 1500 TABLET, EXTENDED RELEASE ORAL at 09:01

## 2024-01-27 RX ADMIN — METOPROLOL TARTRATE 25 MG: 25 TABLET, FILM COATED ORAL at 09:01

## 2024-01-27 RX ADMIN — TIOTROPIUM BROMIDE INHALATION SPRAY 2 PUFF: 3.12 SPRAY, METERED RESPIRATORY (INHALATION) at 07:01

## 2024-01-27 RX ADMIN — FLUTICASONE FUROATE AND VILANTEROL TRIFENATATE 1 PUFF: 100; 25 POWDER RESPIRATORY (INHALATION) at 07:01

## 2024-01-27 RX ADMIN — MONTELUKAST 10 MG: 10 TABLET, FILM COATED ORAL at 09:01

## 2024-01-27 NOTE — PROGRESS NOTES
[facility] - Cardiology [facility] Medicine  Progress Note    Patient Name: Yong Mcintyre  MRN: [id number]  Patient Class: IP- Inpatient   Admission Date: 1/18/2024  Length of Stay: 8 days  Attending Physician: Berenice Lara MD  Primary Care Provider: Gianni Escalona MD        Subjective:     Principal Problem:Acute on chronic heart failure with preserved ejection fraction (HFpEF)        HPI:  Patient is a 49 yo male with a PMH of HFpEF, pickwickian syndrome, HTN, pHTN, Chronic hypoxic respiratory failure (baseline oxygen req at home is 3 L), PFO (unsuccessful closure in 2006), AF (on warfarin for unknown reason) who present with worsening leg swelling and shortness of breath for the past 1 week. He has associated symptoms of orthopnea. He was admitted last month with similar symptoms and was discharged on lasix and metolazone and told to weigh himself daily as he may need dosage adjustment of his diuretics. Patient states he has been compliant with his medications with good urine output but has not been weighing himself. He denies any chest pain, nausea, vomiting, lightheadedness, or dizziness. Interview difficult as patient currently on Bipap.    While in the ED: Patient with oxygen saturation at 79%. Labs reveal worsened BNP >1000. New AARON with Cr 2.7 (up from baseline of 1.5). Initial VBG with pH 7.32 and CO2 83. Initially put on BIPAP but weaned off to high flow nasal cannula. ABG with A total 120 mg of IV Lasix given.      Overview/Hospital Course:  Patient was admitted to  with AHRF in setting of decompensated heart failure. Patient was initially diuresing well with 120mg TID IVP lasix and he was weaned down from 11L NC (on arrival) to 5-6L NC, however UOP started gradually decreasing. Now on lasix drip at 15 mg/hr and daily Metolazone 10mg and currently satting well on 4 L. [facility] is following and agree with continued, aggressive diuresis and is tentatively planning for [facility] 01/29 once patient  becomes euvolemic.     Interval History: NAEON. AFVSS. Continuing to diurese with net negative UOP 1.55 L in last 24 hours. Increasing lasix gtt to 15 mg/hr. Patient reports feeling well overall and satting well on 4L NC. Tentative plans for RHC 01/29.    Review of Systems  Objective:     Vital Signs (Most Recent):  Temp: 98.1 °F (36.7 °C) (01/27/24 1128)  Pulse: 98 (01/27/24 1128)  Resp: 20 (01/27/24 1128)  BP: (!) 104/57 (01/27/24 1128)  SpO2: 95 % (01/27/24 1128) Vital Signs (24h Range):  Temp:  [97.4 °F (36.3 °C)-98.8 °F (37.1 °C)] 98.1 °F (36.7 °C)  Pulse:  [] 98  Resp:  [14-20] 20  SpO2:  [91 %-97 %] 95 %  BP: ()/(49-69) 104/57     Weight: 107 kg (235 lb 14.3 oz)  Body mass index is 39.87 kg/m².    Intake/Output Summary (Last 24 hours) at 1/27/2024 1215  Last data filed at 1/27/2024 0800  Gross per 24 hour   Intake 822 ml   Output 2675 ml   Net -1853 ml         Physical Exam  Constitutional:       General: He is not in acute distress.     Appearance: Normal appearance. He is not ill-appearing.   HENT:      Head: Normocephalic and atraumatic.      Nose: Nose normal.      Mouth/Throat:      Mouth: Mucous membranes are moist.   Eyes:      Extraocular Movements: Extraocular movements intact.      Conjunctiva/sclera: Conjunctivae normal.      Pupils: Pupils are equal, round, and reactive to light.   Cardiovascular:      Rate and Rhythm: Normal rate and regular rhythm.      Pulses: Normal pulses.      Heart sounds: Normal heart sounds. No murmur heard.  Pulmonary:      Effort: Pulmonary effort is normal. No respiratory distress.      Breath sounds: Rales present. No wheezing or rhonchi.      Comments: Currently on 6L NC; crackles to bilateral lung bases  Abdominal:      General: Abdomen is flat. Bowel sounds are normal.      Palpations: Abdomen is soft.   Musculoskeletal:         General: Swelling present.      Right lower leg: Edema present.      Left lower leg: Edema present.   Skin:     General: Skin  is warm and dry.      Capillary Refill: Capillary refill takes less than 2 seconds.   Neurological:      Mental Status: He is alert and oriented to person, place, and time.   Psychiatric:         Mood and Affect: Mood normal.         Behavior: Behavior normal.         Thought Content: Thought content normal.         Judgment: Judgment normal.             Significant Labs: All pertinent labs within the past 24 hours have been reviewed.    Significant Imaging: I have reviewed all pertinent imaging results/findings within the past 24 hours.    Assessment/Plan:      * Acute on chronic heart failure with preserved ejection fraction (HFpEF)  Patient is identified as having Diastolic (HFpEF) heart failure that is Acute on chronic. CHF is currently uncontrolled due to Continued edema of extremities, >3 pillow orthopnea, and Rales/crackles on pulmonary exam. CXR with underlying edema.    Echo (01/19)   Left Ventricle: The left ventricle is normal in size. Normal wall thickness. Normal wall motion. Septal flattening in systole consistent with right ventricular pressure overload. There is normal systolic function. Ejection fraction by visual approximation is 55%. There is normal diastolic function.    Right Ventricle: Severe right ventricular enlargement. Wall thickness is normal. Right ventricle wall motion has global hypokinesis. Systolic function is severely reduced.    Tricuspid Valve: There is moderate to severe regurgitation.    Pulmonary Artery: There is moderate to severe pulmonary hypertension. The estimated pulmonary artery systolic pressure is 67 mmHg.    IVC/SVC: Intermediate venous pressure at 8 mmHg.    Pericardium: There is a trivial effusion.    Recent Labs   Lab 01/18/24  2216   BNP 1,073*       -Initially on Lasix 120 mg IVP TID and diuresing well, however now with gradually decreasing UOP. Transitioned to Lasix gtt and metolazone 10mg q.d.  - Increased Lasix gtt to 15 mg/hr  - Tentative plan for RHC on  Monday if patient appears euvolemic  -Continue Beta Blocker, rest of GDMT is precluded by AARON    -Monitor strict Is&Os and daily weights.    -Place on fluid restriction of 1.5 L.   -Low salt diet     Chronic right-sided heart failure  Repeat Echo showing severe right ventricular enlargement, normal wall thickness, global hypokinesis of right ventricular wall, and systolic function is severely reduced.   - Consulted HTS      Chronic asthmatic bronchitis  - Continue home inhaler alternatives       Acute kidney injury superimposed on CKD  Patient with acute kidney injury/acute renal failure likely due to pre-renal azotemia due to IVVD AARON is currently improving. Baseline creatinine  1.2-1.5  - Labs reviewed- Renal function/electrolytes with Estimated Creatinine Clearance: 48.1 mL/min (A) (based on SCr of 2.1 mg/dL (H)). according to latest data. Monitor urine output and serial BMP and adjust therapy as needed. Avoid nephrotoxins and renally dose meds for GFR listed above.    Acute on chronic respiratory failure with hypoxia and hypercapnia  Patient with Hypercapnic and Hypoxic Respiratory failure which is Acute on chronic.  he is on home oxygen at 3 LPM. CXR with underlying edema.    Likely related to CHF exacerbation. Patient is a chronic CO2 retainer with Pickwickian syndrome.    -Diuresis with Lasix gtt and Metolazone  -Continuous oxygen and pulse ox  -BiPAP QHS      MALLIKA (obstructive sleep apnea)  -BiPAP QHS      PFO (patent foramen ovale)  Thought to be cause of patient's structural heart disease.      Pulmonary hypertension  Managed by pulmonology with [facility] with close follow ups  Sildenafil recently discontinued  Uses 3L NC at baseline  Continue Warfarin. Daily PT/INR  Consulted [facility] due to minimal improvement in O2 requirements following intense diuresis    Pickwickian syndrome  Body mass index is 41.58 kg/m². Morbid obesity complicates all aspects of disease management from diagnostic modalities to treatment.  Weight loss encouraged and health benefits explained to patient.     - BiPAP QHS as tolerated         VTE Risk Mitigation (From admission, onward)           Ordered     warfarin (COUMADIN) tablet 5 mg  Daily         01/25/24 1007     IP VTE HIGH RISK PATIENT  Once         01/19/24 0208     Place sequential compression device  Until discontinued         01/19/24 0208                    Discharge Planning   ESTEBAN: 1/30/2024     Code Status: Full Code   Is the patient medically ready for discharge?: No    Reason for patient still in hospital (select all that apply): Patient trending condition and Treatment  Discharge Plan A: Home                  Andre Calvo DO  Department of Hospital Medicine   [facility] - Cardiology Stepdown

## 2024-01-27 NOTE — RESPIRATORY THERAPY
"RAPID RESPONSE RESPIRATORY THERAPY PROACTIVE ROUNDING NOTE             Time of visit: 1030     Code Status: Full Code   : 1975  Bed: 330/330 A:   MRN: [id number]  Time spent at the bedside: < 15 min    SITUATION    Evaluated patient for: HFNC Compliance     BACKGROUND    Patient has a past medical history of Arthritis, CHF (congestive heart failure), Cor pulmonale, Gallstones, Hypertension, Morbid obesity, Obesity hypoventilation syndrome, On home oxygen therapy, MALLIKA (obstructive sleep apnea), Paroxysmal atrial fibrillation, PFO (patent foramen ovale), Pickwickian syndrome, and Pulmonary hypertension.    24 Hours Vitals Range:  Temp:  [97.4 °F (36.3 °C)-98.8 °F (37.1 °C)]   Pulse:  []   Resp:  [14-20]   BP: ()/(49-69)   SpO2:  [91 %-97 %]     Labs:    Recent Labs     24  0447 24  1358 24  0446 24  1242 24  0304 24  1308      < > 139 140 141 137   K 4.1   < > 3.3* 3.5 3.6 3.5   CL 86*   < > 84* 87* 84* 85*   CO2 40*   < > 40* 43* 43* 39*   BUN 73*   < > 75* 75* 79* 74*   CREATININE 1.9*   < > 2.0* 2.0* 2.1* 2.3*   GLU 84   < > 67* 114* 87 91   PHOS 4.0  --  3.9  --  4.2  --    MG 1.9  --  1.6  --  2.2  --     < > = values in this interval not displayed.        No results for input(s): "PH", "PCO2", "PO2", "HCO3", "POCSATURATED", "BE" in the last 72 hours.    ASSESSMENT/INTERVENTIONS        Last VS   Temp: 98.2 °F (36.8 °C) (1548)  Pulse: 88 (1548)  Resp: 20 (1548)  BP: 97/63 (1548)  SpO2: 93 % (1548)    Level of Consciousness: Level of Consciousness (AVPU): alert  Respiratory Effort: Respiratory Effort: Normal, Unlabored Expansion/Accessory Muscle Usage: Expansion/Accessory Muscles/Retractions: no use of accessory muscles, no retractions, expansion symmetric  All Lung Field Breath Sounds: All Lung Fields Breath Sounds: Anterior:, Lateral:, coarse  O2 Device/Concentration: 4L  Was the O2 device able to be weaned? No  Ambu at " bedside:      Active Orders   Respiratory Care    Bipap QHS     Frequency: QHS     Number of Occurrences: Until Specified     Order Comments: Wean as tolerated       Order Questions:      Mode Bilevel      FiO2% 50      Inspiratory pressure: 18      Expiratory pressure: 12    Oxygen Continuous     Frequency: Continuous     Number of Occurrences: Until Specified     Order Questions:      Device type: High flow      Device: High Flow Nasal Cannula (6 -15 Liters)      LPM: 6-15      Titrate O2 per Oxygen Titration Protocol: Yes      To maintain SpO2 goal of: >= 90%      Notify MD of: Inability to achieve desired SpO2; Sudden change in patient status and requires 20% increase in FiO2; Patient requires >60% FiO2    Pulse Oximetry Continuous     Frequency: Continuous     Number of Occurrences: Until Specified       RECOMMENDATIONS    We recommend: RRT Recs: Continue POC per primary team.      FOLLOW-UP    Please call back the Rapid Response RT, Soniya Corrigan RRT at x [phone] for any questions or concerns.

## 2024-01-27 NOTE — ASSESSMENT & PLAN NOTE
Patient with acute kidney injury/acute renal failure likely due to pre-renal azotemia due to IVVD AARON is currently improving. Baseline creatinine  1.2-1.5  - Labs reviewed- Renal function/electrolytes with Estimated Creatinine Clearance: 48.1 mL/min (A) (based on SCr of 2.1 mg/dL (H)). according to latest data. Monitor urine output and serial BMP and adjust therapy as needed. Avoid nephrotoxins and renally dose meds for GFR listed above.

## 2024-01-27 NOTE — PLAN OF CARE
Shift Summary  Patient is A&OX4. VSS. 4L HFNC with adequate SpO2. Telemetry in place. Lasix gtt increased to 1.5 ml/hr and 15 mg/hr per order. Plan of care and medications reviewed with patient. Safety measures maintained and call bell is within reach. Wheels are locked and bed is in lowest position.         Problem: Adult Inpatient Plan of Care  Goal: Plan of Care Review  Outcome: Ongoing, Progressing  Goal: Patient-Specific Goal (Individualized)  Outcome: Ongoing, Progressing  Goal: Absence of Hospital-Acquired Illness or Injury  Outcome: Ongoing, Progressing  Goal: Optimal Comfort and Wellbeing  Outcome: Ongoing, Progressing  Goal: Readiness for Transition of Care  Outcome: Ongoing, Progressing     Problem: Bariatric Environmental Safety  Goal: Safety Maintained with Care  Outcome: Ongoing, Progressing     Problem: Impaired Wound Healing  Goal: Optimal Wound Healing  Outcome: Ongoing, Progressing     Problem: Fluid and Electrolyte Imbalance (Acute Kidney Injury/Impairment)  Goal: Fluid and Electrolyte Balance  Outcome: Ongoing, Progressing     Problem: Oral Intake Inadequate (Acute Kidney Injury/Impairment)  Goal: Optimal Nutrition Intake  Outcome: Ongoing, Progressing     Problem: Renal Function Impairment (Acute Kidney Injury/Impairment)  Goal: Effective Renal Function  Outcome: Ongoing, Progressing     Problem: Adjustment to Illness (Heart Failure)  Goal: Optimal Coping  Outcome: Ongoing, Progressing     Problem: Cardiac Output Decreased (Heart Failure)  Goal: Optimal Cardiac Output  Outcome: Ongoing, Progressing     Problem: Dysrhythmia (Heart Failure)  Goal: Stable Heart Rate and Rhythm  Outcome: Ongoing, Progressing     Problem: Fluid Imbalance (Heart Failure)  Goal: Fluid Balance  Outcome: Ongoing, Progressing     Problem: Functional Ability Impaired (Heart Failure)  Goal: Optimal Functional Ability  Outcome: Ongoing, Progressing     Problem: Oral Intake Inadequate (Heart Failure)  Goal: Optimal  Nutrition Intake  Outcome: Ongoing, Progressing     Problem: Respiratory Compromise (Heart Failure)  Goal: Effective Oxygenation and Ventilation  Outcome: Ongoing, Progressing     Problem: Sleep Disordered Breathing (Heart Failure)  Goal: Effective Breathing Pattern During Sleep  Outcome: Ongoing, Progressing     Problem: Fall Injury Risk  Goal: Absence of Fall and Fall-Related Injury  Outcome: Ongoing, Progressing

## 2024-01-27 NOTE — CONSULTS
Please see note by Dr Lamb 01/25/2024.    Thank you for your consultation. Please call with questions or concerns.     Kahlil Raymundo MD  Cardiology PGY6  [facility]

## 2024-01-27 NOTE — ASSESSMENT & PLAN NOTE
Managed by pulmonology with [facility] with close follow ups  Sildenafil recently discontinued  Uses 3L NC at baseline  Continue Warfarin. Daily PT/INR  Consulted [facility] due to minimal improvement in O2 requirements following intense diuresis

## 2024-01-27 NOTE — RESPIRATORY THERAPY
RAPID RESPONSE RESPIRATORY CHART CHECK       Chart check completed.  Please call [phone] for further concerns or assistance.

## 2024-01-27 NOTE — ASSESSMENT & PLAN NOTE
Discussed with Mariama and everything is taken care of.     Repeat Echo showing severe right ventricular enlargement, normal wall thickness, global hypokinesis of right ventricular wall, and systolic function is severely reduced.   - Consulted HTS

## 2024-01-27 NOTE — ASSESSMENT & PLAN NOTE
Patient is identified as having Diastolic (HFpEF) heart failure that is Acute on chronic. CHF is currently uncontrolled due to Continued edema of extremities, >3 pillow orthopnea, and Rales/crackles on pulmonary exam. CXR with underlying edema.    Echo (01/19)   Left Ventricle: The left ventricle is normal in size. Normal wall thickness. Normal wall motion. Septal flattening in systole consistent with right ventricular pressure overload. There is normal systolic function. Ejection fraction by visual approximation is 55%. There is normal diastolic function.    Right Ventricle: Severe right ventricular enlargement. Wall thickness is normal. Right ventricle wall motion has global hypokinesis. Systolic function is severely reduced.    Tricuspid Valve: There is moderate to severe regurgitation.    Pulmonary Artery: There is moderate to severe pulmonary hypertension. The estimated pulmonary artery systolic pressure is 67 mmHg.    IVC/SVC: Intermediate venous pressure at 8 mmHg.    Pericardium: There is a trivial effusion.    Recent Labs   Lab 01/18/24  2216   BNP 1,073*       -Initially on Lasix 120 mg IVP TID and diuresing well, however now with gradually decreasing UOP. Transitioned to Lasix gtt and metolazone 10mg q.d.  - Increased Lasix gtt to 15 mg/hr  - Tentative plan for RHC on Monday if patient appears euvolemic  -Continue Beta Blocker, rest of GDMT is precluded by AARON    -Monitor strict Is&Os and daily weights.    -Place on fluid restriction of 1.5 L.   -Low salt diet

## 2024-01-27 NOTE — SUBJECTIVE & OBJECTIVE
Interval History: NAEON. AFVSS. Continuing to diurese with net negative UOP 1.55 L in last 24 hours. Increasing lasix gtt to 15 mg/hr. Patient reports feeling well overall and satting well on 4L NC. Tentative plans for RHC 01/29.    Review of Systems  Objective:     Vital Signs (Most Recent):  Temp: 98.1 °F (36.7 °C) (01/27/24 1128)  Pulse: 98 (01/27/24 1128)  Resp: 20 (01/27/24 1128)  BP: (!) 104/57 (01/27/24 1128)  SpO2: 95 % (01/27/24 1128) Vital Signs (24h Range):  Temp:  [97.4 °F (36.3 °C)-98.8 °F (37.1 °C)] 98.1 °F (36.7 °C)  Pulse:  [] 98  Resp:  [14-20] 20  SpO2:  [91 %-97 %] 95 %  BP: ()/(49-69) 104/57     Weight: 107 kg (235 lb 14.3 oz)  Body mass index is 39.87 kg/m².    Intake/Output Summary (Last 24 hours) at 1/27/2024 1215  Last data filed at 1/27/2024 0800  Gross per 24 hour   Intake 822 ml   Output 2675 ml   Net -1853 ml         Physical Exam  Constitutional:       General: He is not in acute distress.     Appearance: Normal appearance. He is not ill-appearing.   HENT:      Head: Normocephalic and atraumatic.      Nose: Nose normal.      Mouth/Throat:      Mouth: Mucous membranes are moist.   Eyes:      Extraocular Movements: Extraocular movements intact.      Conjunctiva/sclera: Conjunctivae normal.      Pupils: Pupils are equal, round, and reactive to light.   Cardiovascular:      Rate and Rhythm: Normal rate and regular rhythm.      Pulses: Normal pulses.      Heart sounds: Normal heart sounds. No murmur heard.  Pulmonary:      Effort: Pulmonary effort is normal. No respiratory distress.      Breath sounds: Rales present. No wheezing or rhonchi.      Comments: Currently on 6L NC; crackles to bilateral lung bases  Abdominal:      General: Abdomen is flat. Bowel sounds are normal.      Palpations: Abdomen is soft.   Musculoskeletal:         General: Swelling present.      Right lower leg: Edema present.      Left lower leg: Edema present.   Skin:     General: Skin is warm and dry.       Capillary Refill: Capillary refill takes less than 2 seconds.   Neurological:      Mental Status: He is alert and oriented to person, place, and time.   Psychiatric:         Mood and Affect: Mood normal.         Behavior: Behavior normal.         Thought Content: Thought content normal.         Judgment: Judgment normal.             Significant Labs: All pertinent labs within the past 24 hours have been reviewed.    Significant Imaging: I have reviewed all pertinent imaging results/findings within the past 24 hours.

## 2024-01-28 LAB
ALBUMIN SERPL BCP-MCNC: 2.9 G/DL (ref 3.5–5.2)
ALP SERPL-CCNC: 135 U/L (ref 55–135)
ALT SERPL W/O P-5'-P-CCNC: 14 U/L (ref 10–44)
ANION GAP SERPL CALC-SCNC: 12 MMOL/L (ref 8–16)
ANION GAP SERPL CALC-SCNC: 12 MMOL/L (ref 8–16)
AST SERPL-CCNC: 21 U/L (ref 10–40)
BASOPHILS # BLD AUTO: 0.07 K/UL (ref 0–0.2)
BASOPHILS NFR BLD: 1 % (ref 0–1.9)
BILIRUB SERPL-MCNC: 1.5 MG/DL (ref 0.1–1)
BUN SERPL-MCNC: 80 MG/DL (ref 6–20)
BUN SERPL-MCNC: 86 MG/DL (ref 6–20)
CALCIUM SERPL-MCNC: 10 MG/DL (ref 8.7–10.5)
CALCIUM SERPL-MCNC: 9.6 MG/DL (ref 8.7–10.5)
CHLORIDE SERPL-SCNC: 85 MMOL/L (ref 95–110)
CHLORIDE SERPL-SCNC: 85 MMOL/L (ref 95–110)
CO2 SERPL-SCNC: 38 MMOL/L (ref 23–29)
CO2 SERPL-SCNC: 40 MMOL/L (ref 23–29)
CREAT SERPL-MCNC: 2.2 MG/DL (ref 0.5–1.4)
CREAT SERPL-MCNC: 2.2 MG/DL (ref 0.5–1.4)
DIFFERENTIAL METHOD BLD: ABNORMAL
EOSINOPHIL # BLD AUTO: 0.2 K/UL (ref 0–0.5)
EOSINOPHIL NFR BLD: 2.6 % (ref 0–8)
ERYTHROCYTE [DISTWIDTH] IN BLOOD BY AUTOMATED COUNT: 21.4 % (ref 11.5–14.5)
EST. GFR  (NO RACE VARIABLE): 36 ML/MIN/1.73 M^2
EST. GFR  (NO RACE VARIABLE): 36 ML/MIN/1.73 M^2
GLUCOSE SERPL-MCNC: 110 MG/DL (ref 70–110)
GLUCOSE SERPL-MCNC: 84 MG/DL (ref 70–110)
HCT VFR BLD AUTO: 57.4 % (ref 40–54)
HGB BLD-MCNC: 16.4 G/DL (ref 14–18)
IMM GRANULOCYTES # BLD AUTO: 0.01 K/UL (ref 0–0.04)
IMM GRANULOCYTES NFR BLD AUTO: 0.1 % (ref 0–0.5)
INR PPP: 1.9 (ref 0.8–1.2)
LYMPHOCYTES # BLD AUTO: 1.3 K/UL (ref 1–4.8)
LYMPHOCYTES NFR BLD: 18.4 % (ref 18–48)
MAGNESIUM SERPL-MCNC: 1.7 MG/DL (ref 1.6–2.6)
MCH RBC QN AUTO: 25 PG (ref 27–31)
MCHC RBC AUTO-ENTMCNC: 28.6 G/DL (ref 32–36)
MCV RBC AUTO: 88 FL (ref 82–98)
MONOCYTES # BLD AUTO: 0.6 K/UL (ref 0.3–1)
MONOCYTES NFR BLD: 7.7 % (ref 4–15)
NEUTROPHILS # BLD AUTO: 5.1 K/UL (ref 1.8–7.7)
NEUTROPHILS NFR BLD: 70.2 % (ref 38–73)
NRBC BLD-RTO: 0 /100 WBC
PHOSPHATE SERPL-MCNC: 3.6 MG/DL (ref 2.7–4.5)
PLATELET # BLD AUTO: 223 K/UL (ref 150–450)
PMV BLD AUTO: 10.2 FL (ref 9.2–12.9)
POTASSIUM SERPL-SCNC: 3.3 MMOL/L (ref 3.5–5.1)
POTASSIUM SERPL-SCNC: 3.9 MMOL/L (ref 3.5–5.1)
PROT SERPL-MCNC: 8.8 G/DL (ref 6–8.4)
PROTHROMBIN TIME: 19.3 SEC (ref 9–12.5)
RBC # BLD AUTO: 6.56 M/UL (ref 4.6–6.2)
SODIUM SERPL-SCNC: 135 MMOL/L (ref 136–145)
SODIUM SERPL-SCNC: 137 MMOL/L (ref 136–145)
WBC # BLD AUTO: 7.23 K/UL (ref 3.9–12.7)

## 2024-01-28 PROCEDURE — 99900035 HC TECH TIME PER 15 MIN (STAT): Mod: HCNC

## 2024-01-28 PROCEDURE — 85610 PROTHROMBIN TIME: CPT | Mod: HCNC

## 2024-01-28 PROCEDURE — 80053 COMPREHEN METABOLIC PANEL: CPT | Mod: HCNC

## 2024-01-28 PROCEDURE — 63600175 PHARM REV CODE 636 W HCPCS: Mod: HCNC

## 2024-01-28 PROCEDURE — 25000003 PHARM REV CODE 250: Mod: HCNC

## 2024-01-28 PROCEDURE — 94660 CPAP INITIATION&MGMT: CPT | Mod: HCNC

## 2024-01-28 PROCEDURE — 20600001 HC STEP DOWN PRIVATE ROOM: Mod: HCNC

## 2024-01-28 PROCEDURE — 83735 ASSAY OF MAGNESIUM: CPT | Mod: HCNC

## 2024-01-28 PROCEDURE — 25000003 PHARM REV CODE 250: Mod: HCNC | Performed by: STUDENT IN AN ORGANIZED HEALTH CARE EDUCATION/TRAINING PROGRAM

## 2024-01-28 PROCEDURE — 84100 ASSAY OF PHOSPHORUS: CPT | Mod: HCNC

## 2024-01-28 PROCEDURE — 85025 COMPLETE CBC W/AUTO DIFF WBC: CPT | Mod: HCNC | Performed by: STUDENT IN AN ORGANIZED HEALTH CARE EDUCATION/TRAINING PROGRAM

## 2024-01-28 PROCEDURE — 27000221 HC OXYGEN, UP TO 24 HOURS: Mod: HCNC

## 2024-01-28 PROCEDURE — 27100171 HC OXYGEN HIGH FLOW UP TO 24 HOURS: Mod: HCNC

## 2024-01-28 PROCEDURE — 94761 N-INVAS EAR/PLS OXIMETRY MLT: CPT | Mod: HCNC

## 2024-01-28 PROCEDURE — 94640 AIRWAY INHALATION TREATMENT: CPT | Mod: HCNC

## 2024-01-28 PROCEDURE — 80048 BASIC METABOLIC PNL TOTAL CA: CPT | Mod: HCNC,XB

## 2024-01-28 PROCEDURE — 36415 COLL VENOUS BLD VENIPUNCTURE: CPT | Mod: HCNC,XB

## 2024-01-28 RX ORDER — FUROSEMIDE 10 MG/ML
120 INJECTION INTRAMUSCULAR; INTRAVENOUS ONCE
Status: COMPLETED | OUTPATIENT
Start: 2024-01-28 | End: 2024-01-28

## 2024-01-28 RX ORDER — DIPHENHYDRAMINE HCL 25 MG
25 CAPSULE ORAL ONCE
Status: COMPLETED | OUTPATIENT
Start: 2024-01-28 | End: 2024-01-28

## 2024-01-28 RX ORDER — MAGNESIUM SULFATE HEPTAHYDRATE 40 MG/ML
2 INJECTION, SOLUTION INTRAVENOUS ONCE
Status: COMPLETED | OUTPATIENT
Start: 2024-01-28 | End: 2024-01-28

## 2024-01-28 RX ORDER — POTASSIUM CHLORIDE 20 MEQ/1
40 TABLET, EXTENDED RELEASE ORAL ONCE
Status: COMPLETED | OUTPATIENT
Start: 2024-01-28 | End: 2024-01-28

## 2024-01-28 RX ADMIN — FLUTICASONE FUROATE AND VILANTEROL TRIFENATATE 1 PUFF: 100; 25 POWDER RESPIRATORY (INHALATION) at 07:01

## 2024-01-28 RX ADMIN — DIPHENHYDRAMINE HYDROCHLORIDE 25 MG: 25 CAPSULE ORAL at 10:01

## 2024-01-28 RX ADMIN — MAGNESIUM SULFATE HEPTAHYDRATE 2 G: 40 INJECTION, SOLUTION INTRAVENOUS at 09:01

## 2024-01-28 RX ADMIN — METOLAZONE 10 MG: 5 TABLET ORAL at 09:01

## 2024-01-28 RX ADMIN — WARFARIN SODIUM 5 MG: 5 TABLET ORAL at 05:01

## 2024-01-28 RX ADMIN — METOPROLOL TARTRATE 25 MG: 25 TABLET, FILM COATED ORAL at 09:01

## 2024-01-28 RX ADMIN — MONTELUKAST 10 MG: 10 TABLET, FILM COATED ORAL at 10:01

## 2024-01-28 RX ADMIN — METOPROLOL TARTRATE 25 MG: 25 TABLET, FILM COATED ORAL at 10:01

## 2024-01-28 RX ADMIN — POTASSIUM CHLORIDE 40 MEQ: 1500 TABLET, EXTENDED RELEASE ORAL at 05:01

## 2024-01-28 RX ADMIN — FUROSEMIDE 15 MG/HR: 10 INJECTION, SOLUTION INTRAMUSCULAR; INTRAVENOUS at 03:01

## 2024-01-28 RX ADMIN — PANTOPRAZOLE SODIUM 40 MG: 40 TABLET, DELAYED RELEASE ORAL at 09:01

## 2024-01-28 RX ADMIN — FUROSEMIDE 120 MG: 10 INJECTION, SOLUTION INTRAVENOUS at 04:01

## 2024-01-28 RX ADMIN — TIOTROPIUM BROMIDE INHALATION SPRAY 2 PUFF: 3.12 SPRAY, METERED RESPIRATORY (INHALATION) at 07:01

## 2024-01-28 NOTE — PLAN OF CARE
49M with HFpEF, pHTN of unclear etiology with resultant RV failure, on 3L home O2 and BiPAP qhs, CKD3, HTN, PFO presented on 1/18 with dyspnea and SERENA, found to be in ADHF.     ADHF, pHTN, RV failure - has required a lasix drip up to 20mg/hr + metolazone this admission and is ~ -20L. Heart transplant team following and coordinating with pulmonologist Dr. Head at [facility]. Unclear why no longer on pHTN therapy but was in the past. Continue lasix drip 15mg/hr and plan for RHC Monday.     Chronic bronchitis - continue home ICS-LABA and tiotropium     AARON on CKD3 - improved with diuresis since admission. Cr slightly creeping back up but largely stable.     pAF - continue tartrate, coumadin, daily INR

## 2024-01-28 NOTE — ASSESSMENT & PLAN NOTE
Patient with acute kidney injury/acute renal failure likely due to pre-renal azotemia due to IVVD AARON is currently improving. Baseline creatinine  1.5-2.0  - Labs reviewed- Renal function/electrolytes with Estimated Creatinine Clearance: 45.9 mL/min (A) (based on SCr of 2.2 mg/dL (H)). according to latest data. Monitor urine output and serial BMP and adjust therapy as needed. Avoid nephrotoxins and renally dose meds for GFR listed above.

## 2024-01-28 NOTE — PLAN OF CARE
Problem: Adult Inpatient Plan of Care  Goal: Optimal Comfort and Wellbeing  Intervention: Provide Person-Centered Care  Flowsheets (Taken 1/28/2024 1619)  Trust Relationship/Rapport:   care explained   choices provided   emotional support provided   questions answered   questions encouraged     Problem: Fluid and Electrolyte Imbalance (Acute Kidney Injury/Impairment)  Goal: Fluid and Electrolyte Balance  Intervention: Monitor and Manage Fluid and Electrolyte Balance  Flowsheets (Taken 1/28/2024 1619)  Fluid/Electrolyte Management: fluids restricted     Problem: Cardiac Output Decreased (Heart Failure)  Goal: Optimal Cardiac Output  Intervention: Optimize Cardiac Output  Flowsheets (Taken 1/28/2024 1619)  Environmental Support:   calm environment promoted   environmental consistency promoted   comfort object encouraged     Problem: Functional Ability Impaired (Heart Failure)  Goal: Optimal Functional Ability  Intervention: Optimize Functional Ability  Flowsheets (Taken 1/28/2024 1619)  Activity Management:   Ambulated to bathroom - L4   Sitting at edge of bed - L2   Rolling - L1   Walking in place - L3

## 2024-01-28 NOTE — PROGRESS NOTES
[facility] - Cardiology [facility] Medicine  Progress Note    Patient Name: Yong Mcintyre  MRN: [id number]  Patient Class: IP- Inpatient   Admission Date: 1/18/2024  Length of Stay: 9 days  Attending Physician: Berenice Lara MD  Primary Care Provider: Gianni Escalona MD        Subjective:     Principal Problem:Acute on chronic heart failure with preserved ejection fraction (HFpEF)        HPI:  Patient is a 47 yo male with a PMH of HFpEF, pickwickian syndrome, HTN, pHTN, Chronic hypoxic respiratory failure (baseline oxygen req at home is 3 L), PFO (unsuccessful closure in 2006), AF (on warfarin for unknown reason) who present with worsening leg swelling and shortness of breath for the past 1 week. He has associated symptoms of orthopnea. He was admitted last month with similar symptoms and was discharged on lasix and metolazone and told to weigh himself daily as he may need dosage adjustment of his diuretics. Patient states he has been compliant with his medications with good urine output but has not been weighing himself. He denies any chest pain, nausea, vomiting, lightheadedness, or dizziness. Interview difficult as patient currently on Bipap.    While in the ED: Patient with oxygen saturation at 79%. Labs reveal worsened BNP >1000. New AARON with Cr 2.7 (up from baseline of 1.5). Initial VBG with pH 7.32 and CO2 83. Initially put on BIPAP but weaned off to high flow nasal cannula. ABG with A total 120 mg of IV Lasix given.      Overview/Hospital Course:  Patient was admitted to  with AHRF in setting of decompensated heart failure. Patient was initially diuresing well with 120mg TID IVP lasix and he was weaned down from 11L NC (on arrival) to 5-6L NC, however UOP started gradually decreasing. Now on lasix drip at 15 mg/hr and daily Metolazone 10mg and currently satting well on 4 L. [facility] is following and agree with continued, aggressive diuresis and is tentatively planning for [facility] 01/29 once patient  becomes euvolemic.     Interval History: NAEON. Afebrile. HDS. UOP 2.2L with net -1.2L in past 24 hours. Is now -20L since admission. Still on 4L NC, but reports symptomatic improvement and only with bibasilar crackles on physical exam. Likely near euvolemia. Will continue Lasix gtt today. NPO at [facility] for RHC tomorrow.     Review of Systems   Constitutional:  Negative for appetite change, chills, diaphoresis, fatigue and fever.   Eyes:  Negative for visual disturbance.   Respiratory:  Positive for shortness of breath (improved). Negative for cough, chest tightness and wheezing.    Cardiovascular:  Negative for chest pain and palpitations.   Gastrointestinal:  Negative for constipation, diarrhea, nausea and vomiting.   Genitourinary:  Negative for dysuria, frequency and urgency.   Neurological:  Negative for dizziness, facial asymmetry and light-headedness.     Objective:     Vital Signs (Most Recent):  Temp: 97.9 °F (36.6 °C) (01/28/24 0809)  Pulse: 101 (01/28/24 1107)  Resp: 18 (01/28/24 0809)  BP: (!) 101/56 (01/28/24 0809)  SpO2: (!) 92 % (01/28/24 0809) Vital Signs (24h Range):  Temp:  [97.8 °F (36.6 °C)-98.2 °F (36.8 °C)] 97.9 °F (36.6 °C)  Pulse:  [] 101  Resp:  [18-22] 18  SpO2:  [89 %-99 %] 92 %  BP: ()/(56-63) 101/56     Weight: 107 kg (235 lb 14.3 oz)  Body mass index is 39.87 kg/m².    Intake/Output Summary (Last 24 hours) at 1/28/2024 1152  Last data filed at 1/28/2024 0610  Gross per 24 hour   Intake 702 ml   Output 1800 ml   Net -1098 ml         Physical Exam  Constitutional:       General: He is not in acute distress.     Appearance: Normal appearance. He is not ill-appearing.   HENT:      Head: Normocephalic and atraumatic.      Nose: Nose normal.      Mouth/Throat:      Mouth: Mucous membranes are moist.   Eyes:      Extraocular Movements: Extraocular movements intact.      Conjunctiva/sclera: Conjunctivae normal.      Pupils: Pupils are equal, round, and reactive to light.    Cardiovascular:      Rate and Rhythm: Normal rate and regular rhythm.      Pulses: Normal pulses.      Heart sounds: Normal heart sounds. No murmur heard.  Pulmonary:      Effort: Pulmonary effort is normal. No respiratory distress.      Breath sounds: Rales present. No wheezing or rhonchi.      Comments: Currently on 4L NC; crackles to bilateral lung bases, much improved from prior  Abdominal:      General: Abdomen is flat. Bowel sounds are normal.      Palpations: Abdomen is soft.   Musculoskeletal:      Right lower leg: Edema present.      Left lower leg: Edema present.   Skin:     General: Skin is warm and dry.      Capillary Refill: Capillary refill takes less than 2 seconds.   Neurological:      Mental Status: He is alert and oriented to person, place, and time.   Psychiatric:         Mood and Affect: Mood normal.         Behavior: Behavior normal.         Thought Content: Thought content normal.         Judgment: Judgment normal.             Significant Labs: All pertinent labs within the past 24 hours have been reviewed.  CBC:   Recent Labs   Lab 01/28/24  0435   WBC 7.23   HGB 16.4   HCT 57.4*        CMP:   Recent Labs   Lab 01/27/24  0304 01/27/24  1308 01/28/24  0435    137 137   K 3.6 3.5 3.9   CL 84* 85* 85*   CO2 43* 39* 40*   GLU 87 91 84   BUN 79* 74* 86*   CREATININE 2.1* 2.3* 2.2*   CALCIUM 9.6 10.8* 10.0   PROT 8.0  --  8.8*   ALBUMIN 2.7*  --  2.9*   BILITOT 1.5*  --  1.5*   ALKPHOS 112  --  135   AST 18  --  21   ALT 13  --  14   ANIONGAP 14 13 12       Significant Imaging: I have reviewed all pertinent imaging results/findings within the past 24 hours.    Assessment/Plan:      * Acute on chronic heart failure with preserved ejection fraction (HFpEF)  Patient is identified as having Diastolic (HFpEF) heart failure that is Acute on chronic. CHF is currently uncontrolled due to Continued edema of extremities, >3 pillow orthopnea, and Rales/crackles on pulmonary exam. CXR with  underlying edema.    Echo (01/19)   Left Ventricle: The left ventricle is normal in size. Normal wall thickness. Normal wall motion. Septal flattening in systole consistent with right ventricular pressure overload. There is normal systolic function. Ejection fraction by visual approximation is 55%. There is normal diastolic function.    Right Ventricle: Severe right ventricular enlargement. Wall thickness is normal. Right ventricle wall motion has global hypokinesis. Systolic function is severely reduced.    Tricuspid Valve: There is moderate to severe regurgitation.    Pulmonary Artery: There is moderate to severe pulmonary hypertension. The estimated pulmonary artery systolic pressure is 67 mmHg.    IVC/SVC: Intermediate venous pressure at 8 mmHg.    Pericardium: There is a trivial effusion.    Recent Labs   Lab 01/18/24  2216   BNP 1,073*     Improving. Now near euvolemia.     - Initially on Lasix 120 mg IVP TID and diuresed well, however later with gradually decreasing UOP, so was transitioned to Lasix gtt.  - Continue Lasix gtt @ 15 mg/hr and Metolazone 10mg q.d.  - Tentative plan for RHC on Monday. NPO at [facility]  - Continue Beta Blocker, rest of GDMT is precluded by AARON    - Monitor strict Is&Os and daily weights.    - Place on fluid restriction of 1.5 L.   - Low salt diet     Pulmonary hypertension  Managed by [facility]   Sildenafil recently discontinued  Uses 3L NC at baseline  Consulted HTS, appreciate assistance  Plan for RHC on 1/29, NPO at [facility].    Acute on chronic respiratory failure with hypoxia and hypercapnia  Patient with Hypercapnic and Hypoxic Respiratory failure which is Acute on chronic.  he is on home oxygen at 3 LPM. CXR with underlying edema.    Likely secondary to CHF exacerbation. Patient is a chronic CO2 retainer with Pickwickian syndrome.  Almost back to baseline O2 requirement, currently at 4L    -Diuresis with Lasix gtt and Metolazone  -Continuous pulse ox  -Oxygen supplementation with  goal SpO2 88-92% given concomitant COPD  -BiPAP QHS      Chronic right-sided heart failure  Repeat Echo showing severe right ventricular enlargement, normal wall thickness, global hypokinesis of right ventricular wall, and systolic function is severely reduced.   - Consulted HTS      Chronic asthmatic bronchitis  - Continue daily Breo + Spireva      Acute kidney injury superimposed on CKD  Patient with acute kidney injury/acute renal failure likely due to pre-renal azotemia due to IVVD AARON is currently improving. Baseline creatinine  1.5-2.0  - Labs reviewed- Renal function/electrolytes with Estimated Creatinine Clearance: 45.9 mL/min (A) (based on SCr of 2.2 mg/dL (H)). according to latest data. Monitor urine output and serial BMP and adjust therapy as needed. Avoid nephrotoxins and renally dose meds for GFR listed above.    MALLIKA (obstructive sleep apnea)  -BiPAP QHS      PFO (patent foramen ovale)  Thought to be cause of patient's structural heart disease.  S/p closure attempt in 2006 that ultimately failed      Pickwickian syndrome  Body mass index is 39.87 kg/m². Morbid obesity complicates all aspects of disease management from diagnostic modalities to treatment. Weight loss encouraged and health benefits explained to patient.     - BiPAP QHS as tolerated         VTE Risk Mitigation (From admission, onward)           Ordered     warfarin (COUMADIN) tablet 5 mg  Daily         01/25/24 1007     IP VTE HIGH RISK PATIENT  Once         01/19/24 0208     Place sequential compression device  Until discontinued         01/19/24 0208                    Discharge Planning   ESTEBAN: 1/30/2024     Code Status: Full Code   Is the patient medically ready for discharge?: No    Reason for patient still in hospital (select all that apply): Treatment and Consult recommendations  Discharge Plan A: Home        Isadora Armenta MD  Department of Hospital Medicine   [facility] - Cardiology Stepdown

## 2024-01-28 NOTE — ASSESSMENT & PLAN NOTE
Managed by [facility] pulmonology   Sildenafil recently discontinued  Uses 3L NC at baseline  [facility]ulted [facility] [place], appreciate assistance  Plan for RHC on 1/29, NPO at [facility].

## 2024-01-28 NOTE — SUBJECTIVE & OBJECTIVE
Interval History: NAEON. Afebrile. HDS. UOP 2.2L with net -1.2L in past 24 hours. Is now -20L since admission. Still on 4L NC, but reports symptomatic improvement and only with bibasilar crackles on physical exam. Likely near euvolemia. Will continue Lasix gtt today. NPO at [facility] for RHC tomorrow.     Review of Systems   Constitutional:  Negative for appetite change, chills, diaphoresis, fatigue and fever.   Eyes:  Negative for visual disturbance.   Respiratory:  Positive for shortness of breath (improved). Negative for cough, chest tightness and wheezing.    Cardiovascular:  Negative for chest pain and palpitations.   Gastrointestinal:  Negative for constipation, diarrhea, nausea and vomiting.   Genitourinary:  Negative for dysuria, frequency and urgency.   Neurological:  Negative for dizziness, facial asymmetry and light-headedness.     Objective:     Vital Signs (Most Recent):  Temp: 97.9 °F (36.6 °C) (01/28/24 0809)  Pulse: 101 (01/28/24 1107)  Resp: 18 (01/28/24 0809)  BP: (!) 101/56 (01/28/24 0809)  SpO2: (!) 92 % (01/28/24 0809) Vital Signs (24h Range):  Temp:  [97.8 °F (36.6 °C)-98.2 °F (36.8 °C)] 97.9 °F (36.6 °C)  Pulse:  [] 101  Resp:  [18-22] 18  SpO2:  [89 %-99 %] 92 %  BP: ()/(56-63) 101/56     Weight: 107 kg (235 lb 14.3 oz)  Body mass index is 39.87 kg/m².    Intake/Output Summary (Last 24 hours) at 1/28/2024 1152  Last data filed at 1/28/2024 0610  Gross per 24 hour   Intake 702 ml   Output 1800 ml   Net -1098 ml         Physical Exam  Constitutional:       General: He is not in acute distress.     Appearance: Normal appearance. He is not ill-appearing.   HENT:      Head: Normocephalic and atraumatic.      Nose: Nose normal.      Mouth/Throat:      Mouth: Mucous membranes are moist.   Eyes:      Extraocular Movements: Extraocular movements intact.      Conjunctiva/sclera: Conjunctivae normal.      Pupils: Pupils are equal, round, and reactive to light.   Cardiovascular:      Rate and  Rhythm: Normal rate and regular rhythm.      Pulses: Normal pulses.      Heart sounds: Normal heart sounds. No murmur heard.  Pulmonary:      Effort: Pulmonary effort is normal. No respiratory distress.      Breath sounds: Rales present. No wheezing or rhonchi.      Comments: Currently on 4L NC; crackles to bilateral lung bases, much improved from prior  Abdominal:      General: Abdomen is flat. Bowel sounds are normal.      Palpations: Abdomen is soft.   Musculoskeletal:      Right lower leg: Edema present.      Left lower leg: Edema present.   Skin:     General: Skin is warm and dry.      Capillary Refill: Capillary refill takes less than 2 seconds.   Neurological:      Mental Status: He is alert and oriented to person, place, and time.   Psychiatric:         Mood and Affect: Mood normal.         Behavior: Behavior normal.         Thought Content: Thought content normal.         Judgment: Judgment normal.             Significant Labs: All pertinent labs within the past 24 hours have been reviewed.  CBC:   Recent Labs   Lab 01/28/24  0435   WBC 7.23   HGB 16.4   HCT 57.4*        CMP:   Recent Labs   Lab 01/27/24  0304 01/27/24  1308 01/28/24  0435    137 137   K 3.6 3.5 3.9   CL 84* 85* 85*   CO2 43* 39* 40*   GLU 87 91 84   BUN 79* 74* 86*   CREATININE 2.1* 2.3* 2.2*   CALCIUM 9.6 10.8* 10.0   PROT 8.0  --  8.8*   ALBUMIN 2.7*  --  2.9*   BILITOT 1.5*  --  1.5*   ALKPHOS 112  --  135   AST 18  --  21   ALT 13  --  14   ANIONGAP 14 13 12       Significant Imaging: I have reviewed all pertinent imaging results/findings within the past 24 hours.

## 2024-01-28 NOTE — ASSESSMENT & PLAN NOTE
Thought to be cause of patient's structural heart disease.  S/p closure attempt in 2006 that ultimately failed

## 2024-01-28 NOTE — ASSESSMENT & PLAN NOTE
Patient with Hypercapnic and Hypoxic Respiratory failure which is Acute on chronic.  he is on home oxygen at 3 LPM. CXR with underlying edema.    Likely secondary to CHF exacerbation. Patient is a chronic CO2 retainer with Pickwickian syndrome.  Almost back to baseline O2 requirement, currently at 4L    -Diuresis with Lasix gtt and Metolazone  -Continuous pulse ox  -Oxygen supplementation with goal SpO2 88-92% given concomitant COPD  -BiPAP QHS

## 2024-01-28 NOTE — ASSESSMENT & PLAN NOTE
Body mass index is 39.87 kg/m². Morbid obesity complicates all aspects of disease management from diagnostic modalities to treatment. Weight loss encouraged and health benefits explained to patient.     - BiPAP QHS as tolerated

## 2024-01-28 NOTE — ASSESSMENT & PLAN NOTE
Patient is identified as having Diastolic (HFpEF) heart failure that is Acute on chronic. CHF is currently uncontrolled due to Continued edema of extremities, >3 pillow orthopnea, and Rales/crackles on pulmonary exam. CXR with underlying edema.    Echo (01/19)   Left Ventricle: The left ventricle is normal in size. Normal wall thickness. Normal wall motion. Septal flattening in systole consistent with right ventricular pressure overload. There is normal systolic function. Ejection fraction by visual approximation is 55%. There is normal diastolic function.    Right Ventricle: Severe right ventricular enlargement. Wall thickness is normal. Right ventricle wall motion has global hypokinesis. Systolic function is severely reduced.    Tricuspid Valve: There is moderate to severe regurgitation.    Pulmonary Artery: There is moderate to severe pulmonary hypertension. The estimated pulmonary artery systolic pressure is 67 mmHg.    IVC/SVC: Intermediate venous pressure at 8 mmHg.    Pericardium: There is a trivial effusion.    Recent Labs   Lab 01/18/24  2216   BNP 1,073*     Improving. Now near euvolemia.     - Initially on Lasix 120 mg IVP TID and diuresed well, however later with gradually decreasing UOP, so was transitioned to Lasix gtt.  - Continue Lasix gtt @ 15 mg/hr and Metolazone 10mg q.d.  - Tentative plan for RHC on Monday. NPO at [facility]  - Continue Beta Blocker, rest of GDMT is precluded by AARON    - Monitor strict Is&Os and daily weights.    - Place on fluid restriction of 1.5 L.   - Low salt diet

## 2024-01-29 LAB
ALBUMIN SERPL BCP-MCNC: 2.8 G/DL (ref 3.5–5.2)
ALP SERPL-CCNC: 109 U/L (ref 55–135)
ALT SERPL W/O P-5'-P-CCNC: 12 U/L (ref 10–44)
ANION GAP SERPL CALC-SCNC: 10 MMOL/L (ref 8–16)
ANION GAP SERPL CALC-SCNC: 14 MMOL/L (ref 8–16)
AST SERPL-CCNC: 18 U/L (ref 10–40)
BILIRUB SERPL-MCNC: 1.2 MG/DL (ref 0.1–1)
BUN SERPL-MCNC: 83 MG/DL (ref 6–20)
BUN SERPL-MCNC: 84 MG/DL (ref 6–20)
CALCIUM SERPL-MCNC: 10.1 MG/DL (ref 8.7–10.5)
CALCIUM SERPL-MCNC: 9.8 MG/DL (ref 8.7–10.5)
CHLORIDE SERPL-SCNC: 84 MMOL/L (ref 95–110)
CHLORIDE SERPL-SCNC: 86 MMOL/L (ref 95–110)
CO2 SERPL-SCNC: 39 MMOL/L (ref 23–29)
CO2 SERPL-SCNC: 40 MMOL/L (ref 23–29)
CREAT SERPL-MCNC: 2.1 MG/DL (ref 0.5–1.4)
CREAT SERPL-MCNC: 2.2 MG/DL (ref 0.5–1.4)
EST. GFR  (NO RACE VARIABLE): 36 ML/MIN/1.73 M^2
EST. GFR  (NO RACE VARIABLE): 38.1 ML/MIN/1.73 M^2
GLUCOSE SERPL-MCNC: 127 MG/DL (ref 70–110)
GLUCOSE SERPL-MCNC: 87 MG/DL (ref 70–110)
INR PPP: 1.8 (ref 0.8–1.2)
MAGNESIUM SERPL-MCNC: 1.9 MG/DL (ref 1.6–2.6)
PHOSPHATE SERPL-MCNC: 4.4 MG/DL (ref 2.7–4.5)
POTASSIUM SERPL-SCNC: 3.1 MMOL/L (ref 3.5–5.1)
POTASSIUM SERPL-SCNC: 3.8 MMOL/L (ref 3.5–5.1)
PROT SERPL-MCNC: 7.8 G/DL (ref 6–8.4)
PROTHROMBIN TIME: 18.6 SEC (ref 9–12.5)
SODIUM SERPL-SCNC: 135 MMOL/L (ref 136–145)
SODIUM SERPL-SCNC: 138 MMOL/L (ref 136–145)

## 2024-01-29 PROCEDURE — 94761 N-INVAS EAR/PLS OXIMETRY MLT: CPT | Mod: HCNC

## 2024-01-29 PROCEDURE — 94640 AIRWAY INHALATION TREATMENT: CPT | Mod: HCNC

## 2024-01-29 PROCEDURE — 25000003 PHARM REV CODE 250: Mod: HCNC

## 2024-01-29 PROCEDURE — 99233 SBSQ HOSP IP/OBS HIGH 50: CPT | Mod: HCNC,,, | Performed by: INTERNAL MEDICINE

## 2024-01-29 PROCEDURE — 80048 BASIC METABOLIC PNL TOTAL CA: CPT | Mod: HCNC,XB

## 2024-01-29 PROCEDURE — 20600001 HC STEP DOWN PRIVATE ROOM: Mod: HCNC

## 2024-01-29 PROCEDURE — 25000003 PHARM REV CODE 250: Mod: HCNC | Performed by: STUDENT IN AN ORGANIZED HEALTH CARE EDUCATION/TRAINING PROGRAM

## 2024-01-29 PROCEDURE — 84100 ASSAY OF PHOSPHORUS: CPT | Mod: HCNC

## 2024-01-29 PROCEDURE — 94660 CPAP INITIATION&MGMT: CPT | Mod: HCNC

## 2024-01-29 PROCEDURE — 63600175 PHARM REV CODE 636 W HCPCS: Mod: HCNC

## 2024-01-29 PROCEDURE — 80053 COMPREHEN METABOLIC PANEL: CPT | Mod: HCNC

## 2024-01-29 PROCEDURE — 27000221 HC OXYGEN, UP TO 24 HOURS: Mod: HCNC

## 2024-01-29 PROCEDURE — 27100171 HC OXYGEN HIGH FLOW UP TO 24 HOURS: Mod: HCNC

## 2024-01-29 PROCEDURE — 99900035 HC TECH TIME PER 15 MIN (STAT): Mod: HCNC

## 2024-01-29 PROCEDURE — 83735 ASSAY OF MAGNESIUM: CPT | Mod: HCNC

## 2024-01-29 PROCEDURE — 85610 PROTHROMBIN TIME: CPT | Mod: HCNC

## 2024-01-29 PROCEDURE — 36415 COLL VENOUS BLD VENIPUNCTURE: CPT | Mod: HCNC,XB

## 2024-01-29 RX ORDER — DIPHENHYDRAMINE HCL 25 MG
25 CAPSULE ORAL NIGHTLY PRN
Status: DISCONTINUED | OUTPATIENT
Start: 2024-01-29 | End: 2024-02-02 | Stop reason: HOSPADM

## 2024-01-29 RX ADMIN — POTASSIUM BICARBONATE 40 MEQ: 391 TABLET, EFFERVESCENT ORAL at 08:01

## 2024-01-29 RX ADMIN — MONTELUKAST 10 MG: 10 TABLET, FILM COATED ORAL at 08:01

## 2024-01-29 RX ADMIN — WARFARIN SODIUM 5 MG: 5 TABLET ORAL at 05:01

## 2024-01-29 RX ADMIN — METOPROLOL TARTRATE 25 MG: 25 TABLET, FILM COATED ORAL at 09:01

## 2024-01-29 RX ADMIN — FUROSEMIDE 15 MG/HR: 10 INJECTION, SOLUTION INTRAMUSCULAR; INTRAVENOUS at 10:01

## 2024-01-29 RX ADMIN — POTASSIUM BICARBONATE 40 MEQ: 391 TABLET, EFFERVESCENT ORAL at 09:01

## 2024-01-29 RX ADMIN — FLUTICASONE FUROATE AND VILANTEROL TRIFENATATE 1 PUFF: 100; 25 POWDER RESPIRATORY (INHALATION) at 10:01

## 2024-01-29 RX ADMIN — METOLAZONE 10 MG: 5 TABLET ORAL at 09:01

## 2024-01-29 RX ADMIN — PANTOPRAZOLE SODIUM 40 MG: 40 TABLET, DELAYED RELEASE ORAL at 09:01

## 2024-01-29 RX ADMIN — TIOTROPIUM BROMIDE INHALATION SPRAY 2 PUFF: 3.12 SPRAY, METERED RESPIRATORY (INHALATION) at 10:01

## 2024-01-29 RX ADMIN — METOPROLOL TARTRATE 25 MG: 25 TABLET, FILM COATED ORAL at 08:01

## 2024-01-29 NOTE — PROGRESS NOTES
[facility] - Cardiology Stepdown  Heart Transplant  Progress Note  Patient Name: Yong Mcintyre  MRN: [id number]  Admission Date: 1/18/2024  Attending Physician: Berenice Lara MD  Primary Care Provider: Gianni Escalona MD  Principal Problem:Acute on chronic heart failure with preserved ejection fraction (HFpEF)    Subjective:     Interval History:  NAEON. Significant improvement in Hypoxia with diuresis and on 4 l NC( 3 l nc  baseline at home). Still Hypervolemic on exam. Continue lasix gtt. likely RHC tomorrow once euvolemic.         Overview/Hospital Course:  Patient was admitted to  with AHRF in setting of decompensated heart failure and Pulmonary Hypertension. Patient was initially diuresing well with 120mg TID IVP lasix and he was weaned down from 11L NC (on arrival) to 5-6L NC( at home uses  3 L oxygen at baseline), however UOP started gradually decreasing. on lasix drip at 20 mg/hr and daily Metolazone 10mg with good UOP.      HTS service consulted 1/25/24 in the setting of Pulmonary Hypertension and minimal improvement in oxygen requirement.   Past Medical History:   Diagnosis Date    Arthritis     CHF (congestive heart failure)     Diastolic dysfunction    Cor pulmonale 11/05/2012    Gallstones     Hypertension     Morbid obesity 11/05/2012    Obesity hypoventilation syndrome     On home oxygen therapy 03/07/2014    MALLIKA (obstructive sleep apnea)     BPAP 16/11 with 3 L at night (continuous O2).    Paroxysmal atrial fibrillation     PFO (patent foramen ovale) 11/05/2012    status post closure    Pickwickian syndrome 11/05/2012    Pulmonary hypertension        Past Surgical History:   Procedure Laterality Date    RIGHT HEART CATHETERIZATION Right 3/22/2022    Procedure: INSERTION, CATHETER, RIGHT HEART;  Surgeon: Tony Martínez MD;  Location: [facility] CATH LAB;  Service: Cardiology;  Laterality: Right;       Review of patient's allergies indicates:   Allergen Reactions    Lipitor [atorvastatin] Other  "(See Comments)     "it makes my legs feel like jelly"  Other reaction(s): causes legs to hurt       Current Facility-Administered Medications   Medication    albuterol inhaler 2 puff    fluticasone furoate-vilanteroL 100-25 mcg/dose diskus inhaler 1 puff    furosemide (LASIX) 500 mg in 50 mL infusion (conc: 10 mg/mL)    melatonin tablet 6 mg    metOLazone tablet 10 mg    metoprolol tartrate (LOPRESSOR) tablet 25 mg    montelukast tablet 10 mg    ondansetron disintegrating tablet 4 mg    pantoprazole EC tablet 40 mg    sodium chloride 0.9% flush 10 mL    tiotropium bromide 2.5 mcg/actuation inhaler 2 puff    warfarin (COUMADIN) tablet 5 mg     Family History       Problem Relation (Age of Onset)    Arthritis Mother, Maternal Grandmother, Maternal Grandfather    Asthma Mother, Sister, Maternal Grandmother    Breast cancer Paternal Grandmother    Diabetes Maternal Grandmother    Down syndrome Brother    Gout Brother    Hypertension Father, Maternal Grandmother, Maternal Grandfather, Paternal Grandfather          Tobacco Use    Smoking status: Never    Smokeless tobacco: Never   Substance and Sexual Activity    Alcohol use: Yes     Comment: holidays  rare    Drug use: No    Sexual activity: Not Currently     Partners: Female     Review of Systems  Objective:     Vital Signs (Most Recent):  Temp: 97.5 °F (36.4 °C) (01/29/24 1139)  Pulse: 94 (01/29/24 1139)  Resp: 18 (01/29/24 1139)  BP: (!) 109/58 (01/29/24 1139)  SpO2: (!) 92 % (01/29/24 1139) Vital Signs (24h Range):  Temp:  [97.4 °F (36.3 °C)-99.6 °F (37.6 °C)] 97.5 °F (36.4 °C)  Pulse:  [] 94  Resp:  [18-20] 18  SpO2:  [90 %-95 %] 92 %  BP: ()/(53-65) 109/58     Weight: 106.6 kg (235 lb 0.2 oz)  Body mass index is 39.72 kg/m².      Intake/Output Summary (Last 24 hours) at 1/29/2024 1204  Last data filed at 1/29/2024 1053  Gross per 24 hour   Intake 400 ml   Output 2900 ml   Net -2500 ml         Physical Exam  Gen: NAD  CVS: s1s2. JVD+  Pulm: bibasilar " "crackles  GI: soft NT  Ext: 1-2+ pitting edema b/l  Significant Labs:  CBC:  Recent Labs   Lab 01/28/24 0435   WBC 7.23   RBC 6.56*   HGB 16.4   HCT 57.4*      MCV 88   MCH 25.0*   MCHC 28.6*       BNP:  No results for input(s): "BNP" in the last 72 hours.    Invalid input(s): "BNPTRIAGELBLO"  CMP:  Recent Labs   Lab 01/29/24 0417   GLU 87   CALCIUM 9.8   ALBUMIN 2.8*   PROT 7.8   *   K 3.1*   CO2 39*   CL 86*   BUN 83*   CREATININE 2.1*   ALKPHOS 109   ALT 12   AST 18   BILITOT 1.2*        Coagulation:   Recent Labs   Lab 01/29/24 0417   INR 1.8*       LDH:  No results for input(s): "LDH" in the last 72 hours.  Microbiology:  Microbiology Results (last 7 days)       ** No results found for the last 168 hours. **             ABGs: No results for input(s): "PH", "PCO2", "HCO3", "POCSATURATED", "BE" in the last 72 hours.  BMP:   Recent Labs   Lab 01/29/24 0417   GLU 87   *   K 3.1*   CL 86*   CO2 39*   BUN 83*   CREATININE 2.1*   CALCIUM 9.8   MG 1.9       Cardiac Markers: No results for input(s): "CKMB", "TROPONINT", "MYOGLOBIN" in the last 72 hours.  Coagulation:   Recent Labs   Lab 01/29/24 0417   INR 1.8*       I have reviewed all pertinent labs within the past 24 hours.      Assessment/Plan:     #Acute on Chronic Hypoxic Hyercapneic respiratory failure( multifactorial- HfpEF/pulm htn/OHS )- on 4 l oxygen( 3l baseline at home)  #AARON on CKD prerenal s/t volume overload.   #Moderate Pulmonary Hypertension( ?Grp 1 and 2)  #PFO with unsuccessful closure 2006- suspect ?worsening shuntin the setting of volume overload and HfpEF exacerbation.  #P. Afib on Coumadin for unknown reason. Currently in NSR  #CKD  #MALLIKA/OHS    RH 2022 : RA 5 , PCWP 25, PA 58/28, CI 2.8. PVR 2.63.   TTE 1/19/24: severe RV enlargement, Severely reduced RV function, LVEF >55%, moderate to severe TR, PASP 67 mm hg.   Significant improvement in Hypoxia with diuresis.     Recommendations:  -pt appears volume overloaded and " improving on lasix 15/hr with good UOP and metolazone 10 daily. Continue diuresis with lasix gtt.   - Likley RHC tomorrow once euvolemic,   to assess Hemodynamics and shunt evaluation. ( Qp/Qs)  - on Coumadin for afib. Currently in NSR. INR 1.8  - not taking Bosentan and sildenefil per patient as it was DC'd in November by [facility] pulmonology.   -continue Toprol 25 bid.       Bj Lamb MD  Heart Transplant  [facility] - [facility] Stepdown

## 2024-01-29 NOTE — PLAN OF CARE
Patient remains alert and oriented x 4 with call bell in reach and he has been encouraged to call with any needs.   His oxygen has been titrated to 3L this morning, no report of shortness of breath noted this shift and Bipap placed by RRT when he was ready to go to sleep.   No procedures or tests this shift but patient has been NPO since midnight in preparation for his Right heart cath that is scheduled for today Monday 1/29. No pain reported this shift.  IV Lasix at 1.5mL/hr still infusing; patient is compliant with using his urinals for accurate output documentation.  Patient states that he has difficulty resting and Melatonin is not helpful, he says that he takes Benadryl at home. The on call Hospitalist was contacted for Benadryl to be added to MAR. No other changes noted this shift. Will continue to monitor.     Problem: Impaired Wound Healing  Goal: Optimal Wound Healing  Outcome: Ongoing, Progressing     Problem: Fluid and Electrolyte Imbalance (Acute Kidney Injury/Impairment)  Goal: Fluid and Electrolyte Balance  Outcome: Ongoing, Progressing     Problem: Renal Function Impairment (Acute Kidney Injury/Impairment)  Goal: Effective Renal Function  Outcome: Ongoing, Progressing     Problem: Adjustment to Illness (Heart Failure)  Goal: Optimal Coping  Outcome: Ongoing, Progressing     Problem: Cardiac Output Decreased (Heart Failure)  Goal: Optimal Cardiac Output  Outcome: Ongoing, Progressing     Problem: Dysrhythmia (Heart Failure)  Goal: Stable Heart Rate and Rhythm  Outcome: Ongoing, Progressing     Problem: Fluid Imbalance (Heart Failure)  Goal: Fluid Balance  Outcome: Ongoing, Progressing     Problem: Functional Ability Impaired (Heart Failure)  Goal: Optimal Functional Ability  Outcome: Ongoing, Progressing     Problem: Oral Intake Inadequate (Heart Failure)  Goal: Optimal Nutrition Intake  Outcome: Ongoing, Progressing

## 2024-01-29 NOTE — PLAN OF CARE
49M with HFpEF, pHTN of unclear etiology with resultant RV failure, on 3L home O2 and BiPAP qhs, CKD3, HTN, PFO presented on 1/18 with dyspnea and SERENA, found to be in ADHF.     ADHF, pHTN, RV failure - has required a lasix drip up to 20mg/hr + metolazone this admission and is ~ -20L. Heart transplant team following and [place] [facility] [place] Unclear why no longer on pHTN therapy but was in the past. Continue lasix drip 15mg/hr; RHC delayed due to still volume overloaded. NPO at midnight.      Chronic bronchitis - continue home ICS-LABA and tiotropium     AARON on CKD3 - improved with diuresis since admission. Cr slightly creeping back up but largely stable.     pAF - continue tartrate, coumadin, daily INR

## 2024-01-29 NOTE — ASSESSMENT & PLAN NOTE
Managed by [facility] pulmonology   Sildenafil recently discontinued  Uses 3L NC at baseline    Plan:  - Consulted HTS, appreciate assistance  - Planned for RHC on 1/29; delayed due to patient still being volume overloaded. Will continue diuresis with continuous lasix gtt. NPO at midnight.   - Per HTS, patient is pHTN group 2 and indication for warfarin is hx of Afib. If that is the case, would consider transitioning warfarin to a DOAC upon discharge (will discuss financial preferences with patient).

## 2024-01-29 NOTE — SUBJECTIVE & OBJECTIVE
Interval History: Overnight attempted to go down to 3L NC however sats were in the mid 80s, so went back to 4L NC. RHC was planned for today, but unable to be completed due to volume status. Will continue to diures in the meantime. Cardiology to attempt RHC when patient is euvolemic.     Review of Systems  Objective:     Vital Signs (Most Recent):  Temp: 97.5 °F (36.4 °C) (01/29/24 1139)  Pulse: 94 (01/29/24 1139)  Resp: 18 (01/29/24 1139)  BP: (!) 109/58 (01/29/24 1139)  SpO2: (!) 92 % (01/29/24 1139) Vital Signs (24h Range):  Temp:  [97.4 °F (36.3 °C)-99.6 °F (37.6 °C)] 97.5 °F (36.4 °C)  Pulse:  [] 94  Resp:  [18-20] 18  SpO2:  [90 %-95 %] 92 %  BP: ()/(53-65) 109/58     Weight: 106.6 kg (235 lb 0.2 oz)  Body mass index is 39.72 kg/m².    Intake/Output Summary (Last 24 hours) at 1/29/2024 1440  Last data filed at 1/29/2024 1200  Gross per 24 hour   Intake 400 ml   Output 3101 ml   Net -2701 ml         Physical Exam  Constitutional:       General: He is not in acute distress.     Appearance: Normal appearance. He is not ill-appearing.   HENT:      Head: Normocephalic and atraumatic.      Nose: Nose normal.      Mouth/Throat:      Mouth: Mucous membranes are moist.   Eyes:      Extraocular Movements: Extraocular movements intact.      Conjunctiva/sclera: Conjunctivae normal.      Pupils: Pupils are equal, round, and reactive to light.   Cardiovascular:      Rate and Rhythm: Normal rate and regular rhythm.      Pulses: Normal pulses.      Heart sounds: Normal heart sounds. No murmur heard.  Pulmonary:      Effort: Pulmonary effort is normal. No respiratory distress.      Breath sounds: Rales present. No wheezing or rhonchi.      Comments: Currently on 4L NC; BLL crackles noted  Abdominal:      General: Abdomen is flat. Bowel sounds are normal.      Palpations: Abdomen is soft.   Musculoskeletal:         General: Swelling present.      Right lower leg: Edema present.      Left lower leg: Edema present.    Skin:     General: Skin is warm and dry.      Capillary Refill: Capillary refill takes less than 2 seconds.   Neurological:      Mental Status: He is alert and oriented to person, place, and time.   Psychiatric:         Mood and Affect: Mood normal.         Behavior: Behavior normal.         Thought Content: Thought content normal.         Judgment: Judgment normal.             Significant Labs: All pertinent labs within the past 24 hours have been reviewed.    Significant Imaging: I have reviewed all pertinent imaging results/findings within the past 24 hours.

## 2024-01-29 NOTE — PROGRESS NOTES
[facility] - Cardiology [facility] Medicine  Progress Note    Patient Name: Yong Mcintyre  MRN: [id number]  Patient Class: IP- Inpatient   Admission Date: 1/18/2024  Length of Stay: 10 days  Attending Physician: Berenice Lara MD  Primary Care Provider: Gianni Escalona MD        Subjective:     Principal Problem:Acute on chronic heart failure with preserved ejection fraction (HFpEF)        HPI:  Patient is a 49 yo male with a PMH of HFpEF, pickwickian syndrome, HTN, pHTN, Chronic hypoxic respiratory failure (baseline oxygen req at home is 3 L), PFO (unsuccessful closure in 2006), AF (on warfarin for unknown reason) who present with worsening leg swelling and shortness of breath for the past 1 week. He has associated symptoms of orthopnea. He was admitted last month with similar symptoms and was discharged on lasix and metolazone and told to weigh himself daily as he may need dosage adjustment of his diuretics. Patient states he has been compliant with his medications with good urine output but has not been weighing himself. He denies any chest pain, nausea, vomiting, lightheadedness, or dizziness. Interview difficult as patient currently on Bipap.    While in the ED: Patient with oxygen saturation at 79%. Labs reveal worsened BNP >1000. New AARON with Cr 2.7 (up from baseline of 1.5). Initial VBG with pH 7.32 and CO2 83. Initially put on BIPAP but weaned off to high flow nasal cannula. ABG with A total 120 mg of IV Lasix given.      Overview/Hospital Course:  Patient was admitted to  with AHRF in setting of decompensated heart failure. Patient was initially diuresing well with 120mg TID IVP lasix and he was weaned down from 11L NC (on arrival) to 5-6L NC, however UOP started gradually decreasing. Now on lasix drip at 15 mg/hr and daily Metolazone 10mg and currently satting well on 4 L. [facility] is following and agree with continued, aggressive diuresis and is tentatively planning for [facility] 01/29 once  patient becomes euvolemic; delayed as patient is not euvolemic. Will continue to diurese and plan to RHC. Patient is pHTN group 2.    Interval History: Overnight attempted to go down to 3L NC however sats were in the mid 80s, so went back to 4L NC. RHC was planned for today, but unable to be completed due to volume status. Will continue to diures in the meantime. Cardiology to attempt RHC when patient is euvolemic.     Review of Systems  Objective:     Vital Signs (Most Recent):  Temp: 97.5 °F (36.4 °C) (01/29/24 1139)  Pulse: 94 (01/29/24 1139)  Resp: 18 (01/29/24 1139)  BP: (!) 109/58 (01/29/24 1139)  SpO2: (!) 92 % (01/29/24 1139) Vital Signs (24h Range):  Temp:  [97.4 °F (36.3 °C)-99.6 °F (37.6 °C)] 97.5 °F (36.4 °C)  Pulse:  [] 94  Resp:  [18-20] 18  SpO2:  [90 %-95 %] 92 %  BP: ()/(53-65) 109/58     Weight: 106.6 kg (235 lb 0.2 oz)  Body mass index is 39.72 kg/m².    Intake/Output Summary (Last 24 hours) at 1/29/2024 1440  Last data filed at 1/29/2024 1200  Gross per 24 hour   Intake 400 ml   Output 3101 ml   Net -2701 ml         Physical Exam  Constitutional:       General: He is not in acute distress.     Appearance: Normal appearance. He is not ill-appearing.   HENT:      Head: Normocephalic and atraumatic.      Nose: Nose normal.      Mouth/Throat:      Mouth: Mucous membranes are moist.   Eyes:      Extraocular Movements: Extraocular movements intact.      Conjunctiva/sclera: Conjunctivae normal.      Pupils: Pupils are equal, round, and reactive to light.   Cardiovascular:      Rate and Rhythm: Normal rate and regular rhythm.      Pulses: Normal pulses.      Heart sounds: Normal heart sounds. No murmur heard.  Pulmonary:      Effort: Pulmonary effort is normal. No respiratory distress.      Breath sounds: Rales present. No wheezing or rhonchi.      Comments: Currently on 4L NC; BLL crackles noted  Abdominal:      General: Abdomen is flat. Bowel sounds are normal.      Palpations: Abdomen is  soft.   Musculoskeletal:         General: Swelling present.      Right lower leg: Edema present.      Left lower leg: Edema present.   Skin:     General: Skin is warm and dry.      Capillary Refill: Capillary refill takes less than 2 seconds.   Neurological:      Mental Status: He is alert and oriented to person, place, and time.   Psychiatric:         Mood and Affect: Mood normal.         Behavior: Behavior normal.         Thought Content: Thought content normal.         Judgment: Judgment normal.             Significant Labs: All pertinent labs within the past 24 hours have been reviewed.    Significant Imaging: I have reviewed all pertinent imaging results/findings within the past 24 hours.    Assessment/Plan:      * Acute on chronic heart failure with preserved ejection fraction (HFpEF)  Patient is identified as having Diastolic (HFpEF) heart failure that is Acute on chronic. CHF is currently uncontrolled due to Continued edema of extremities, >3 pillow orthopnea, and Rales/crackles on pulmonary exam. CXR with underlying edema.    Echo (01/19)   Left Ventricle: The left ventricle is normal in size. Normal wall thickness. Normal wall motion. Septal flattening in systole consistent with right ventricular pressure overload. There is normal systolic function. Ejection fraction by visual approximation is 55%. There is normal diastolic function.    Right Ventricle: Severe right ventricular enlargement. Wall thickness is normal. Right ventricle wall motion has global hypokinesis. Systolic function is severely reduced.    Tricuspid Valve: There is moderate to severe regurgitation.    Pulmonary Artery: There is moderate to severe pulmonary hypertension. The estimated pulmonary artery systolic pressure is 67 mmHg.    IVC/SVC: Intermediate venous pressure at 8 mmHg.    Pericardium: There is a trivial effusion.    Recent Labs   Lab 01/18/24  2216   BNP 1,073*     Improving. Now near euvolemia.     - Initially on Lasix 120  mg IVP TID and diuresed well, however later with gradually decreasing UOP, so was transitioned to Lasix gtt.  - Continue Lasix gtt @ 15 mg/hr and Metolazone 10mg q.d.  - RHC today was delayed due to volume overload; cards to attempt when patient is euvolemic. NPO at MN  - Continue Beta Blocker, rest of GDMT is precluded by AARON    - Monitor strict Is&Os and daily weights.    - Place on fluid restriction of 1.5 L.   - Low salt diet     Chronic right-sided heart failure  Repeat Echo showing severe right ventricular enlargement, normal wall thickness, global hypokinesis of right ventricular wall, and systolic function is severely reduced.   - Consulted HTS      Chronic asthmatic bronchitis  - Continue daily Breo + Spireva      Acute kidney injury superimposed on CKD  Patient with acute kidney injury/acute renal failure likely due to pre-renal azotemia due to IVVD AARON is currently improving. Baseline creatinine  1.5-2.0  - Labs reviewed- Renal function/electrolytes with Estimated Creatinine Clearance: 48 mL/min (A) (based on SCr of 2.1 mg/dL (H)). according to latest data. Monitor urine output and serial BMP and adjust therapy as needed. Avoid nephrotoxins and renally dose meds for GFR listed above.    Acute on chronic respiratory failure with hypoxia and hypercapnia  Patient with Hypercapnic and Hypoxic Respiratory failure which is Acute on chronic.  he is on home oxygen at 3 LPM. CXR with underlying edema.    Likely secondary to CHF exacerbation. Patient is a chronic CO2 retainer with Pickwickian syndrome.  Almost back to baseline O2 requirement, currently at 4L    -Diuresis with Lasix gtt and Metolazone  -Continuous pulse ox  -Oxygen supplementation with goal SpO2 88-92% given concomitant COPD  -BiPAP QHS      MALLIKA (obstructive sleep apnea)  -BiPAP QHS      PFO (patent foramen ovale)  Thought to be cause of patient's structural heart disease.  S/p closure attempt in 2006 that ultimately failed      Pulmonary  hypertension  Managed by [facility] pulmonology   Sildenafil recently discontinued  Uses 3L NC at baseline    Plan:  - Consulted HTS, appreciate assistance  - Planned for RHC on 1/29; delayed due to patient still being volume overloaded. Will continue diuresis with continuous lasix gtt. NPO at midnight.   - Per HTS, patient is pHTN group 2 and indication for warfarin is hx of Afib. If that is the case, would consider transitioning warfarin to a DOAC upon discharge (will discuss financial preferences with patient).    Pickwickian syndrome  Body mass index is 39.87 kg/m². Morbid obesity complicates all aspects of disease management from diagnostic modalities to treatment. Weight loss encouraged and health benefits explained to patient.     - BiPAP QHS as tolerated         VTE Risk Mitigation (From admission, onward)           Ordered     warfarin (COUMADIN) tablet 5 mg  Daily         01/25/24 1007     IP VTE HIGH RISK PATIENT  Once         01/19/24 0208     Place sequential compression device  Until discontinued         01/19/24 0208                    Discharge Planning   ESTEBAN: 2/2/2024     Code Status: Full Code   Is the patient medically ready for discharge?: No    Reason for patient still in hospital (select all that apply): Patient trending condition, Treatment, and Consult recommendations  Discharge Plan A: Home                  Apple Gallegos MD  Department of Hospital Medicine   [facility] - Cardiology Stepdown

## 2024-01-29 NOTE — ASSESSMENT & PLAN NOTE
Patient with acute kidney injury/acute renal failure likely due to pre-renal azotemia due to IVVD AARON is currently improving. Baseline creatinine  1.5-2.0  - Labs reviewed- Renal function/electrolytes with Estimated Creatinine Clearance: 48 mL/min (A) (based on SCr of 2.1 mg/dL (H)). according to latest data. Monitor urine output and serial BMP and adjust therapy as needed. Avoid nephrotoxins and renally dose meds for GFR listed above.

## 2024-01-29 NOTE — ASSESSMENT & PLAN NOTE
Patient is identified as having Diastolic (HFpEF) heart failure that is Acute on chronic. CHF is currently uncontrolled due to Continued edema of extremities, >3 pillow orthopnea, and Rales/crackles on pulmonary exam. CXR with underlying edema.    Echo (01/19)   Left Ventricle: The left ventricle is normal in size. Normal wall thickness. Normal wall motion. Septal flattening in systole consistent with right ventricular pressure overload. There is normal systolic function. Ejection fraction by visual approximation is 55%. There is normal diastolic function.    Right Ventricle: Severe right ventricular enlargement. Wall thickness is normal. Right ventricle wall motion has global hypokinesis. Systolic function is severely reduced.    Tricuspid Valve: There is moderate to severe regurgitation.    Pulmonary Artery: There is moderate to severe pulmonary hypertension. The estimated pulmonary artery systolic pressure is 67 mmHg.    IVC/SVC: Intermediate venous pressure at 8 mmHg.    Pericardium: There is a trivial effusion.    Recent Labs   Lab 01/18/24  2216   BNP 1,073*     Improving. Now near euvolemia.     - Initially on Lasix 120 mg IVP TID and diuresed well, however later with gradually decreasing UOP, so was transitioned to Lasix gtt.  - Continue Lasix gtt @ 15 mg/hr and Metolazone 10mg q.d.  - RHC today was delayed due to volume overload; cards to attempt when patient is euvolemic. NPO at MN  - Continue Beta Blocker, rest of GDMT is precluded by AARON    - Monitor strict Is&Os and daily weights.    - Place on fluid restriction of 1.5 L.   - Low salt diet

## 2024-01-30 LAB
ALBUMIN SERPL BCP-MCNC: 3 G/DL (ref 3.5–5.2)
ALP SERPL-CCNC: 117 U/L (ref 55–135)
ALT SERPL W/O P-5'-P-CCNC: 14 U/L (ref 10–44)
ANION GAP SERPL CALC-SCNC: 11 MMOL/L (ref 8–16)
ANION GAP SERPL CALC-SCNC: 7 MMOL/L (ref 8–16)
ANION GAP SERPL CALC-SCNC: 9 MMOL/L (ref 8–16)
AST SERPL-CCNC: 19 U/L (ref 10–40)
BILIRUB SERPL-MCNC: 1.5 MG/DL (ref 0.1–1)
BUN SERPL-MCNC: 89 MG/DL (ref 6–20)
BUN SERPL-MCNC: 89 MG/DL (ref 6–20)
BUN SERPL-MCNC: 90 MG/DL (ref 6–20)
CALCIUM SERPL-MCNC: 10.1 MG/DL (ref 8.7–10.5)
CALCIUM SERPL-MCNC: 10.5 MG/DL (ref 8.7–10.5)
CALCIUM SERPL-MCNC: 9.7 MG/DL (ref 8.7–10.5)
CHLORIDE SERPL-SCNC: 82 MMOL/L (ref 95–110)
CHLORIDE SERPL-SCNC: 85 MMOL/L (ref 95–110)
CHLORIDE SERPL-SCNC: 86 MMOL/L (ref 95–110)
CO2 SERPL-SCNC: 42 MMOL/L (ref 23–29)
CO2 SERPL-SCNC: 43 MMOL/L (ref 23–29)
CO2 SERPL-SCNC: 44 MMOL/L (ref 23–29)
CREAT SERPL-MCNC: 2 MG/DL (ref 0.5–1.4)
CREAT SERPL-MCNC: 2.4 MG/DL (ref 0.5–1.4)
CREAT SERPL-MCNC: 2.5 MG/DL (ref 0.5–1.4)
EST. GFR  (NO RACE VARIABLE): 30.9 ML/MIN/1.73 M^2
EST. GFR  (NO RACE VARIABLE): 32.5 ML/MIN/1.73 M^2
EST. GFR  (NO RACE VARIABLE): 40.4 ML/MIN/1.73 M^2
GLUCOSE SERPL-MCNC: 123 MG/DL (ref 70–110)
GLUCOSE SERPL-MCNC: 88 MG/DL (ref 70–110)
GLUCOSE SERPL-MCNC: 90 MG/DL (ref 70–110)
INR PPP: 2.1 (ref 0.8–1.2)
MAGNESIUM SERPL-MCNC: 1.8 MG/DL (ref 1.6–2.6)
PHOSPHATE SERPL-MCNC: 4.5 MG/DL (ref 2.7–4.5)
POTASSIUM SERPL-SCNC: 3.4 MMOL/L (ref 3.5–5.1)
POTASSIUM SERPL-SCNC: 3.4 MMOL/L (ref 3.5–5.1)
POTASSIUM SERPL-SCNC: 3.6 MMOL/L (ref 3.5–5.1)
PROT SERPL-MCNC: 8.7 G/DL (ref 6–8.4)
PROTHROMBIN TIME: 21.5 SEC (ref 9–12.5)
SODIUM SERPL-SCNC: 134 MMOL/L (ref 136–145)
SODIUM SERPL-SCNC: 137 MMOL/L (ref 136–145)
SODIUM SERPL-SCNC: 138 MMOL/L (ref 136–145)

## 2024-01-30 PROCEDURE — 99900035 HC TECH TIME PER 15 MIN (STAT): Mod: HCNC

## 2024-01-30 PROCEDURE — 27100171 HC OXYGEN HIGH FLOW UP TO 24 HOURS: Mod: HCNC

## 2024-01-30 PROCEDURE — 80053 COMPREHEN METABOLIC PANEL: CPT | Mod: HCNC

## 2024-01-30 PROCEDURE — 84100 ASSAY OF PHOSPHORUS: CPT | Mod: HCNC

## 2024-01-30 PROCEDURE — 83735 ASSAY OF MAGNESIUM: CPT | Mod: HCNC

## 2024-01-30 PROCEDURE — 94640 AIRWAY INHALATION TREATMENT: CPT | Mod: HCNC

## 2024-01-30 PROCEDURE — 27000221 HC OXYGEN, UP TO 24 HOURS: Mod: HCNC

## 2024-01-30 PROCEDURE — 94761 N-INVAS EAR/PLS OXIMETRY MLT: CPT | Mod: HCNC

## 2024-01-30 PROCEDURE — 25000003 PHARM REV CODE 250: Mod: HCNC | Performed by: STUDENT IN AN ORGANIZED HEALTH CARE EDUCATION/TRAINING PROGRAM

## 2024-01-30 PROCEDURE — 80048 BASIC METABOLIC PNL TOTAL CA: CPT | Mod: 91,HCNC,XB

## 2024-01-30 PROCEDURE — 25000003 PHARM REV CODE 250: Mod: HCNC

## 2024-01-30 PROCEDURE — 36415 COLL VENOUS BLD VENIPUNCTURE: CPT | Mod: HCNC,XB

## 2024-01-30 PROCEDURE — 63600175 PHARM REV CODE 636 W HCPCS: Mod: HCNC

## 2024-01-30 PROCEDURE — 94660 CPAP INITIATION&MGMT: CPT | Mod: HCNC

## 2024-01-30 PROCEDURE — 80048 BASIC METABOLIC PNL TOTAL CA: CPT | Mod: HCNC,XB

## 2024-01-30 PROCEDURE — 85610 PROTHROMBIN TIME: CPT | Mod: HCNC

## 2024-01-30 PROCEDURE — 99233 SBSQ HOSP IP/OBS HIGH 50: CPT | Mod: HCNC,,, | Performed by: INTERNAL MEDICINE

## 2024-01-30 PROCEDURE — 20600001 HC STEP DOWN PRIVATE ROOM: Mod: HCNC

## 2024-01-30 RX ORDER — POTASSIUM CHLORIDE 20 MEQ/1
40 TABLET, EXTENDED RELEASE ORAL ONCE
Status: COMPLETED | OUTPATIENT
Start: 2024-01-30 | End: 2024-01-30

## 2024-01-30 RX ORDER — POTASSIUM CHLORIDE 20 MEQ/1
40 TABLET, EXTENDED RELEASE ORAL ONCE
Status: DISCONTINUED | OUTPATIENT
Start: 2024-01-30 | End: 2024-01-30

## 2024-01-30 RX ORDER — MAGNESIUM SULFATE HEPTAHYDRATE 40 MG/ML
2 INJECTION, SOLUTION INTRAVENOUS ONCE
Status: COMPLETED | OUTPATIENT
Start: 2024-01-30 | End: 2024-01-30

## 2024-01-30 RX ORDER — MAGNESIUM SULFATE HEPTAHYDRATE 40 MG/ML
2 INJECTION, SOLUTION INTRAVENOUS ONCE
Status: DISCONTINUED | OUTPATIENT
Start: 2024-01-30 | End: 2024-01-30

## 2024-01-30 RX ADMIN — TIOTROPIUM BROMIDE INHALATION SPRAY 2 PUFF: 3.12 SPRAY, METERED RESPIRATORY (INHALATION) at 10:01

## 2024-01-30 RX ADMIN — POTASSIUM CHLORIDE 40 MEQ: 1500 TABLET, EXTENDED RELEASE ORAL at 08:01

## 2024-01-30 RX ADMIN — POTASSIUM CHLORIDE 40 MEQ: 1500 TABLET, EXTENDED RELEASE ORAL at 04:01

## 2024-01-30 RX ADMIN — METOPROLOL TARTRATE 25 MG: 25 TABLET, FILM COATED ORAL at 08:01

## 2024-01-30 RX ADMIN — DIPHENHYDRAMINE HYDROCHLORIDE 25 MG: 25 CAPSULE ORAL at 08:01

## 2024-01-30 RX ADMIN — WARFARIN SODIUM 5 MG: 5 TABLET ORAL at 04:01

## 2024-01-30 RX ADMIN — MAGNESIUM SULFATE HEPTAHYDRATE 2 G: 40 INJECTION, SOLUTION INTRAVENOUS at 04:01

## 2024-01-30 RX ADMIN — MONTELUKAST 10 MG: 10 TABLET, FILM COATED ORAL at 08:01

## 2024-01-30 RX ADMIN — PANTOPRAZOLE SODIUM 40 MG: 40 TABLET, DELAYED RELEASE ORAL at 08:01

## 2024-01-30 RX ADMIN — METOLAZONE 10 MG: 5 TABLET ORAL at 08:01

## 2024-01-30 RX ADMIN — FLUTICASONE FUROATE AND VILANTEROL TRIFENATATE 1 PUFF: 100; 25 POWDER RESPIRATORY (INHALATION) at 10:01

## 2024-01-30 NOTE — PROGRESS NOTES
[facility] - Cardiology Stepdown  Heart Transplant  Progress Note  Patient Name: Yong Mcintyre  MRN: [id number]  Admission Date: 1/18/2024  Attending Physician: Berenice Lara MD  Primary Care Provider: Gianni Escalona MD  Principal Problem:Acute on chronic heart failure with preserved ejection fraction (HFpEF)    Subjective:     Interval History:  NAEON. Significant improvement in Hypoxia with diuresis and on 4 l NC( 3 l nc  baseline at home). RHC postponed to tomorrow am. Held the lasix drip as cr elevated and on exam looks close to euvolemia.       Overview/Hospital Course:  Patient was admitted to  with AHRF in setting of decompensated heart failure and Pulmonary Hypertension. Patient was initially diuresing well with 120mg TID IVP lasix and he was weaned down from 11L NC (on arrival) to 5-6L NC( at home uses  3 L oxygen at baseline), however UOP started gradually decreasing. on lasix drip at 20 mg/hr and daily Metolazone 10mg with good UOP.      HTS service consulted 1/25/24 in the setting of Pulmonary Hypertension and minimal improvement in oxygen requirement.   Past Medical History:   Diagnosis Date    Arthritis     CHF (congestive heart failure)     Diastolic dysfunction    Cor pulmonale 11/05/2012    Gallstones     Hypertension     Morbid obesity 11/05/2012    Obesity hypoventilation syndrome     On home oxygen therapy 03/07/2014    MALLIKA (obstructive sleep apnea)     BPAP 16/11 with 3 L at night (continuous O2).    Paroxysmal atrial fibrillation     PFO (patent foramen ovale) 11/05/2012    status post closure    Pickwickian syndrome 11/05/2012    Pulmonary hypertension        Past Surgical History:   Procedure Laterality Date    RIGHT HEART CATHETERIZATION Right 3/22/2022    Procedure: INSERTION, CATHETER, RIGHT HEART;  Surgeon: Tony Martínez MD;  Location: [facility] CATH LAB;  Service: Cardiology;  Laterality: Right;       Review of patient's allergies indicates:   Allergen Reactions    Lipitor  "[atorvastatin] Other (See Comments)     "it makes my legs feel like jelly"  Other reaction(s): causes legs to hurt       Current Facility-Administered Medications   Medication    albuterol inhaler 2 puff    diphenhydrAMINE capsule 25 mg    fluticasone furoate-vilanteroL 100-25 mcg/dose diskus inhaler 1 puff    magnesium sulfate 2g in water 50mL IVPB (premix)    melatonin tablet 6 mg    metoprolol tartrate (LOPRESSOR) tablet 25 mg    montelukast tablet 10 mg    ondansetron disintegrating tablet 4 mg    pantoprazole EC tablet 40 mg    potassium chloride SA CR tablet 40 mEq    sodium chloride 0.9% flush 10 mL    tiotropium bromide 2.5 mcg/actuation inhaler 2 puff    warfarin (COUMADIN) tablet 5 mg     Family History       Problem Relation (Age of Onset)    Arthritis Mother, Maternal Grandmother, Maternal Grandfather    Asthma Mother, Sister, Maternal Grandmother    Breast cancer Paternal Grandmother    Diabetes Maternal Grandmother    Down syndrome Brother    Gout Brother    Hypertension Father, Maternal Grandmother, Maternal Grandfather, Paternal Grandfather          Tobacco Use    Smoking status: Never    Smokeless tobacco: Never   Substance and Sexual Activity    Alcohol use: Yes     Comment: holidays  rare    Drug use: No    Sexual activity: Not Currently     Partners: Female     Review of Systems  Objective:     Vital Signs (Most Recent):  Temp: 98 °F (36.7 °C) (01/30/24 1118)  Pulse: 88 (01/30/24 1537)  Resp: 18 (01/30/24 1118)  BP: (!) 105/57 (01/30/24 1118)  SpO2: (!) 94 % (01/30/24 1200) Vital Signs (24h Range):  Temp:  [96.6 °F (35.9 °C)-98 °F (36.7 °C)] 98 °F (36.7 °C)  Pulse:  [] 88  Resp:  [18-20] 18  SpO2:  [94 %-98 %] 94 %  BP: ()/(57-68) 105/57     Weight: 106 kg (233 lb 11 oz)  Body mass index is 39.49 kg/m².      Intake/Output Summary (Last 24 hours) at 1/30/2024 1558  Last data filed at 1/30/2024 1321  Gross per 24 hour   Intake 360 ml   Output 1875 ml   Net -1515 ml         Physical " "Exam  Gen: NAD  CVS: s1s2. JVD-  Pulm: bibasilar crackles  GI: soft NT  Ext: 1-2+ pitting edema b/l  Significant Labs:  CBC:  Recent Labs   Lab 01/28/24  0435   WBC 7.23   RBC 6.56*   HGB 16.4   HCT 57.4*      MCV 88   MCH 25.0*   MCHC 28.6*       BNP:  No results for input(s): "BNP" in the last 72 hours.    Invalid input(s): "BNPTRIAGELBLO"  CMP:  Recent Labs   Lab 01/30/24  0338 01/30/24  1245   GLU 88 90   CALCIUM 10.1 10.5   ALBUMIN 3.0*  --    PROT 8.7*  --     138   K 3.4* 3.4*   CO2 44* 43*   CL 82* 86*   BUN 90* 89*   CREATININE 2.5* 2.0*   ALKPHOS 117  --    ALT 14  --    AST 19  --    BILITOT 1.5*  --         Coagulation:   Recent Labs   Lab 01/30/24 0338   INR 2.1*       LDH:  No results for input(s): "LDH" in the last 72 hours.  Microbiology:  Microbiology Results (last 7 days)       ** No results found for the last 168 hours. **             ABGs: No results for input(s): "PH", "PCO2", "HCO3", "POCSATURATED", "BE" in the last 72 hours.  BMP:   Recent Labs   Lab 01/30/24  0338 01/30/24  1245   GLU 88 90    138   K 3.4* 3.4*   CL 82* 86*   CO2 44* 43*   BUN 90* 89*   CREATININE 2.5* 2.0*   CALCIUM 10.1 10.5   MG 1.8  --        Cardiac Markers: No results for input(s): "CKMB", "TROPONINT", "MYOGLOBIN" in the last 72 hours.  Coagulation:   Recent Labs   Lab 01/30/24 0338   INR 2.1*       I have reviewed all pertinent labs within the past 24 hours.      Assessment/Plan:     #Acute on Chronic Hypoxic Hyercapneic respiratory failure( multifactorial- HfpEF/pulm htn/OHS )- on 4 l oxygen( 3l baseline at home)  #AARON on CKD prerenal s/t volume overload.   #Moderate Pulmonary Hypertension( ?Grp 1 and 2)  #PFO with unsuccessful closure 2006- suspect ?worsening shuntin the setting of volume overload and HfpEF exacerbation.  #P. Afib on Coumadin for unknown reason. Currently in NSR  #CKD  #MALLIKA/OHS    [facility] 2022 : RA 5 , PCWP 25, PA 58/28, CI 2.8. PVR 2.63.   TTE 1/19/24: severe RV enlargement, " Severely reduced RV function, LVEF >55%, moderate to severe TR, PASP 67 mm hg.   Significant improvement in Hypoxia( 11l ---to 4l) with diuresis.     Recommendations:  -pt appears euvolemic on exam today with no JVD. Cr elevated to 2.5. will hold diuresis and RHC tomorrow am to assess Hemodynamics and for shunt evaluation. ( Qp/Qs)  - on Coumadin for afib. Currently in NSR. INR 1.8  - not taking Bosentan and sildenefil per patient as it was DC'd in November by [facility] pulmonology.   -continue Toprol 25 bid.       Bj Lamb MD  Heart Transplant  [facility] - Cardiology Stepdown

## 2024-01-30 NOTE — PLAN OF CARE
49M with HFpEF, pHTN of unclear etiology with resultant RV failure, on 3L home O2 and BiPAP qhs, CKD3, HTN, PFO presented on 1/18 with dyspnea and SERENA, found to be in ADHF.     ADHF, pHTN, RV failure - has required a lasix drip up to 20mg/hr + metolazone this admission and is ~ -20L. Heart transplant team following and coordinating with pulmonologist Dr. Head at [facility]. Unclear why no longer on pHTN therapy but was in the past. Pause lasix drip per cardiology. RHC planned for today. F/u further cards recs after cath.     Chronic bronchitis - continue home ICS-LABA and tiotropium     AARON on CKD3 - improved with diuresis since admission. Cr slightly creeping back up but largely stable.     pAF - continue tartrate, coumadin, daily INR

## 2024-01-30 NOTE — ASSESSMENT & PLAN NOTE
Patient with acute kidney injury/acute renal failure likely due to pre-renal azotemia due to IVVD AARON is currently improving. Baseline creatinine  1.5-2.0  - Labs reviewed- Renal function/electrolytes with Estimated Creatinine Clearance: 40.2 mL/min (A) (based on SCr of 2.5 mg/dL (H)). according to latest data. Monitor urine output and serial BMP and adjust therapy as needed. Avoid nephrotoxins and renally dose meds for GFR listed above.

## 2024-01-30 NOTE — PLAN OF CARE
Problem: Bariatric Environmental Safety  Goal: Safety Maintained with Care  Outcome: Ongoing, Progressing     Problem: Impaired Wound Healing  Goal: Optimal Wound Healing  Outcome: Ongoing, Progressing     Problem: Fluid and Electrolyte Imbalance (Acute Kidney Injury/Impairment)  Goal: Fluid and Electrolyte Balance  Outcome: Ongoing, Progressing     Problem: Oral Intake Inadequate (Acute Kidney Injury/Impairment)  Goal: Optimal Nutrition Intake  Outcome: Ongoing, Progressing     Problem: Renal Function Impairment (Acute Kidney Injury/Impairment)  Goal: Effective Renal Function  Outcome: Ongoing, Progressing     Problem: Adjustment to Illness (Heart Failure)  Goal: Optimal Coping  Outcome: Ongoing, Progressing     Problem: Cardiac Output Decreased (Heart Failure)  Goal: Optimal Cardiac Output  Outcome: Ongoing, Progressing     Problem: Dysrhythmia (Heart Failure)  Goal: Stable Heart Rate and Rhythm  Outcome: Ongoing, Progressing     Problem: Fluid Imbalance (Heart Failure)  Goal: Fluid Balance  Outcome: Ongoing, Progressing

## 2024-01-30 NOTE — NURSING
On call MD for Med 5 team notified of the patient's critical CO2 at 44. No new orders at this time, will continue to monitor.

## 2024-01-30 NOTE — PLAN OF CARE
Problem: Adult Inpatient Plan of Care  Goal: Plan of Care Review  Outcome: Ongoing, Not Progressing  Goal: Patient-Specific Goal (Individualized)  Outcome: Ongoing, Not Progressing  Goal: Absence of Hospital-Acquired Illness or Injury  Outcome: Ongoing, Not Progressing  Goal: Optimal Comfort and Wellbeing  Outcome: Ongoing, Not Progressing  Goal: Readiness for Transition of Care  Outcome: Ongoing, Not Progressing     Problem: Bariatric Environmental Safety  Goal: Safety Maintained with Care  Outcome: Ongoing, Not Progressing     Problem: Impaired Wound Healing  Goal: Optimal Wound Healing  Outcome: Ongoing, Not Progressing     Problem: Fluid and Electrolyte Imbalance (Acute Kidney Injury/Impairment)  Goal: Fluid and Electrolyte Balance  Outcome: Ongoing, Not Progressing     Problem: Oral Intake Inadequate (Acute Kidney Injury/Impairment)  Goal: Optimal Nutrition Intake  Outcome: Ongoing, Not Progressing     Problem: Renal Function Impairment (Acute Kidney Injury/Impairment)  Goal: Effective Renal Function  Outcome: Ongoing, Not Progressing     Problem: Adjustment to Illness (Heart Failure)  Goal: Optimal Coping  Outcome: Ongoing, Not Progressing     Problem: Cardiac Output Decreased (Heart Failure)  Goal: Optimal Cardiac Output  Outcome: Ongoing, Not Progressing     Problem: Dysrhythmia (Heart Failure)  Goal: Stable Heart Rate and Rhythm  Outcome: Ongoing, Not Progressing     Problem: Fluid Imbalance (Heart Failure)  Goal: Fluid Balance  Outcome: Ongoing, Not Progressing     Problem: Functional Ability Impaired (Heart Failure)  Goal: Optimal Functional Ability  Outcome: Ongoing, Not Progressing     Problem: Oral Intake Inadequate (Heart Failure)  Goal: Optimal Nutrition Intake  Outcome: Ongoing, Not Progressing     Problem: Respiratory Compromise (Heart Failure)  Goal: Effective Oxygenation and Ventilation  Outcome: Ongoing, Not Progressing     Problem: Sleep Disordered Breathing (Heart Failure)  Goal: Effective  Breathing Pattern During Sleep  Outcome: Ongoing, Not Progressing     Problem: Fall Injury Risk  Goal: Absence of Fall and Fall-Related Injury  Outcome: Ongoing, Not Progressing

## 2024-01-30 NOTE — ASSESSMENT & PLAN NOTE
Patient is identified as having Diastolic (HFpEF) heart failure that is Acute on chronic. CHF is currently uncontrolled due to Continued edema of extremities, >3 pillow orthopnea, and Rales/crackles on pulmonary exam. CXR with underlying edema.    Echo (01/19)   Left Ventricle: The left ventricle is normal in size. Normal wall thickness. Normal wall motion. Septal flattening in systole consistent with right ventricular pressure overload. There is normal systolic function. Ejection fraction by visual approximation is 55%. There is normal diastolic function.    Right Ventricle: Severe right ventricular enlargement. Wall thickness is normal. Right ventricle wall motion has global hypokinesis. Systolic function is severely reduced.    Tricuspid Valve: There is moderate to severe regurgitation.    Pulmonary Artery: There is moderate to severe pulmonary hypertension. The estimated pulmonary artery systolic pressure is 67 mmHg.    IVC/SVC: Intermediate venous pressure at 8 mmHg.    Pericardium: There is a trivial effusion.    Recent Labs   Lab 01/18/24  2216   BNP 1,073*     Improving. Now near euvolemia.     - Initially on Lasix 120 mg IVP TID and diuresed well, however later with gradually decreasing UOP, so was transitioned to Lasix gtt.  - Paused diuresis regiment of lasix gtt @ 15 mg/hr and metolazone 10mg q.d. due to euvolemic state and worsening alkalosis and Cr bump c/f contraction alkalosis  - RHC planned for today; patient remains NPO till procedure, will resume cardiac diet afterwards  - Continue Beta Blocker, rest of GDMT is precluded by AARON    - Monitor strict Is&Os and daily weights.    - Place on fluid restriction of 1.5 L.   - Low salt diet

## 2024-01-30 NOTE — ASSESSMENT & PLAN NOTE
[facility] by [facility]   Sildenafil recently discontinued  Uses 3L NC at baseline    Plan:  - Consulted HTS, appreciate assistance  - Per HTS, patient is pHTN group 2 and indication for warfarin is hx of Afib. If that is the case, would consider transitioning warfarin to a DOAC upon discharge (will discuss financial preferences with patient).  - Cardiology planning to do RHC today  - lasix gtt stopped due to worsening metabolic alkalosis and Cr bump c/f contraction alkalosis.

## 2024-01-30 NOTE — PROGRESS NOTES
[facility] - Cardiology [facility] Medicine  Progress Note    Patient Name: Yong Mcintyre  MRN: [id number]  Patient Class: IP- Inpatient   Admission Date: 1/18/2024  Length of Stay: 11 days  Attending Physician: Berenice Lara MD  Primary Care Provider: Gianni Escalona MD        Subjective:     Principal Problem:Acute on chronic heart failure with preserved ejection fraction (HFpEF)        HPI:  Patient is a 47 yo male with a PMH of HFpEF, pickwickian syndrome, HTN, pHTN, Chronic hypoxic respiratory failure (baseline oxygen req at home is 3 L), PFO (unsuccessful closure in 2006), AF (on warfarin for unknown reason) who present with worsening leg swelling and shortness of breath for the past 1 week. He has associated symptoms of orthopnea. He was admitted last month with similar symptoms and was discharged on lasix and metolazone and told to weigh himself daily as he may need dosage adjustment of his diuretics. Patient states he has been compliant with his medications with good urine output but has not been weighing himself. He denies any chest pain, nausea, vomiting, lightheadedness, or dizziness. Interview difficult as patient currently on Bipap.    While in the ED: Patient with oxygen saturation at 79%. Labs reveal worsened BNP >1000. New AARON with Cr 2.7 (up from baseline of 1.5). Initial VBG with pH 7.32 and CO2 83. Initially put on BIPAP but weaned off to high flow nasal cannula. ABG with A total 120 mg of IV Lasix given.      Overview/Hospital Course:  Patient was admitted to  with AHRF in setting of decompensated heart failure. Patient was initially diuresing well with 120mg TID IVP lasix and he was weaned down from 11L NC (on arrival) to 5-6L NC, however UOP started gradually decreasing. Now on lasix drip at 15 mg/hr and daily Metolazone 10mg and currently satting well on 4 L. [facility] is following and agree with continued, aggressive diuresis and is tentatively planning for [facility] 01/29 once  patient becomes euvolemic; delayed as patient is not euvolemic. Will continue to diurese and plan to RHC. Patient is pHTN group 2. RHC planned for today 01/30/24. Diuresis with lasix gtt and metolazone paused since patient appears to euvolemic and alkalosis is worsening with Cr bump.     Interval History: NAOE. Received benadryl overnight for sleep. Lasix gtt stopped per cardiology who plans to do a RHC today. Diuresis stopped in the setting of worsening alkalosis and Cr bump.     Review of Systems  Objective:     Vital Signs (Most Recent):  Temp: 98 °F (36.7 °C) (01/30/24 1118)  Pulse: 90 (01/30/24 1200)  Resp: 18 (01/30/24 1118)  BP: (!) 105/57 (01/30/24 1118)  SpO2: 96 % (01/30/24 1118) Vital Signs (24h Range):  Temp:  [96.6 °F (35.9 °C)-98 °F (36.7 °C)] 98 °F (36.7 °C)  Pulse:  [] 90  Resp:  [18-20] 18  SpO2:  [93 %-98 %] 96 %  BP: ()/(57-68) 105/57     Weight: 106 kg (233 lb 11 oz)  Body mass index is 39.49 kg/m².    Intake/Output Summary (Last 24 hours) at 1/30/2024 1321  Last data filed at 1/30/2024 1118  Gross per 24 hour   Intake 360 ml   Output 1800 ml   Net -1440 ml         Physical Exam  Vitals and nursing note reviewed.   Constitutional:       General: He is not in acute distress.     Appearance: Normal appearance. He is not ill-appearing.   HENT:      Head: Normocephalic and atraumatic.      Nose: Nose normal.      Mouth/Throat:      Mouth: Mucous membranes are moist.   Eyes:      Extraocular Movements: Extraocular movements intact.      Conjunctiva/sclera: Conjunctivae normal.      Pupils: Pupils are equal, round, and reactive to light.   Cardiovascular:      Rate and Rhythm: Normal rate and regular rhythm.      Pulses: Normal pulses.      Heart sounds: Normal heart sounds. No murmur heard.  Pulmonary:      Effort: Pulmonary effort is normal. No respiratory distress.      Breath sounds: Rales present. No wheezing or rhonchi.      Comments: BLL crackles minimal (improved compared to  before)  Abdominal:      General: Abdomen is flat. Bowel sounds are normal.      Palpations: Abdomen is soft.   Musculoskeletal:         General: Swelling present.      Right lower leg: Edema present.      Left lower leg: Edema present.      Comments: LE edema has improved drastically   Skin:     General: Skin is warm and dry.      Capillary Refill: Capillary refill takes less than 2 seconds.   Neurological:      Mental Status: He is alert and oriented to person, place, and time.   Psychiatric:         Mood and Affect: Mood normal.         Behavior: Behavior normal.         Thought Content: Thought content normal.         Judgment: Judgment normal.             Significant Labs: All pertinent labs within the past 24 hours have been reviewed.    Significant Imaging: I have reviewed all pertinent imaging results/findings within the past 24 hours.    Assessment/Plan:      * Acute on chronic heart failure with preserved ejection fraction (HFpEF)  Patient is identified as having Diastolic (HFpEF) heart failure that is Acute on chronic. CHF is currently uncontrolled due to Continued edema of extremities, >3 pillow orthopnea, and Rales/crackles on pulmonary exam. CXR with underlying edema.    Echo (01/19)   Left Ventricle: The left ventricle is normal in size. Normal wall thickness. Normal wall motion. Septal flattening in systole consistent with right ventricular pressure overload. There is normal systolic function. Ejection fraction by visual approximation is 55%. There is normal diastolic function.    Right Ventricle: Severe right ventricular enlargement. Wall thickness is normal. Right ventricle wall motion has global hypokinesis. Systolic function is severely reduced.    Tricuspid Valve: There is moderate to severe regurgitation.    Pulmonary Artery: There is moderate to severe pulmonary hypertension. The estimated pulmonary artery systolic pressure is 67 mmHg.    IVC/SVC: Intermediate venous pressure at 8 mmHg.     Pericardium: There is a trivial effusion.    Recent Labs   Lab 01/18/24  2216   BNP 1,073*     Improving. Now near euvolemia.     - Initially on Lasix 120 mg IVP TID and diuresed well, however later with gradually decreasing UOP, so was transitioned to Lasix gtt.  - Paused diuresis regiment of lasix gtt @ 15 mg/hr and metolazone 10mg q.d. due to euvolemic state and worsening alkalosis and Cr bump c/f contraction alkalosis  - RHC planned for today; patient remains NPO till procedure, will resume cardiac diet afterwards  - Continue Beta Blocker, rest of GDMT is precluded by AARON    - Monitor strict Is&Os and daily weights.    - Place on fluid restriction of 1.5 L.   - Low salt diet     Chronic right-sided heart failure  Repeat Echo showing severe right ventricular enlargement, normal wall thickness, global hypokinesis of right ventricular wall, and systolic function is severely reduced.   - Consulted HTS      Chronic asthmatic bronchitis  - Continue daily Breo + Spireva      Acute kidney injury superimposed on CKD  Patient with acute kidney injury/acute renal failure likely due to pre-renal azotemia due to IVVD AARON is currently improving. Baseline creatinine  1.5-2.0  - Labs reviewed- Renal function/electrolytes with Estimated Creatinine Clearance: 40.2 mL/min (A) (based on SCr of 2.5 mg/dL (H)). according to latest data. Monitor urine output and serial BMP and adjust therapy as needed. Avoid nephrotoxins and renally dose meds for GFR listed above.    Acute on chronic respiratory failure with hypoxia and hypercapnia  Patient with Hypercapnic and Hypoxic Respiratory failure which is Acute on chronic.  he is on home oxygen at 3 LPM. CXR with underlying edema.    Likely secondary to CHF exacerbation. Patient is a chronic CO2 retainer with Pickwickian syndrome.  Almost back to baseline O2 requirement, currently at 4L    -Diuresis with Lasix gtt and Metolazone; lasix gtt and metolazone paused due to euvolemic state and  worsening metabolic alkalosis and Cr bump c/f contraction alkalosis.  -Continuous pulse ox  -Oxygen supplementation with goal SpO2 88-92% given concomitant COPD  -BiPAP QHS      MALLIKA (obstructive sleep apnea)  -BiPAP QHS      PFO (patent foramen ovale)  Thought to be cause of patient's structural heart disease.  S/p closure attempt in 2006 that ultimately failed      Pulmonary hypertension  Managed by [facility]   Sildenafil recently discontinued  Uses 3L NC at baseline    Plan:  - Consulted HTS, appreciate assistance  - Per HTS, patient is pHTN group 2 and indication for warfarin is hx of Afib. If that is the case, would consider transitioning warfarin to a DOAC upon discharge (will discuss financial preferences with patient).  - Cardiology planning to do RHC today  - lasix gtt stopped due to worsening metabolic alkalosis and Cr bump c/f contraction alkalosis.    Pickwickian syndrome  Body mass index is 39.87 kg/m². Morbid obesity complicates all aspects of disease management from diagnostic modalities to treatment. Weight loss encouraged and health benefits explained to patient.     - BiPAP QHS as tolerated         VTE Risk Mitigation (From admission, onward)           Ordered     warfarin (COUMADIN) tablet 5 mg  Daily         01/25/24 1007     IP VTE HIGH RISK PATIENT  Once         01/19/24 0208     Place sequential compression device  Until discontinued         01/19/24 0208                    Discharge Planning   ESTEBAN: 2/2/2024     Code Status: Full Code   Is the patient medically ready for discharge?: No    Reason for patient still in hospital (select all that apply): Patient trending condition, Treatment, and Consult recommendations  Discharge Plan A: Home                  Apple Gallegos MD  Department of Hospital Medicine   [facility] - Cardiology Stepdown

## 2024-01-30 NOTE — ASSESSMENT & PLAN NOTE
Patient with Hypercapnic and Hypoxic Respiratory failure which is Acute on chronic.  he is on home oxygen at 3 LPM. CXR with underlying edema.    Likely secondary to CHF exacerbation. Patient is a chronic CO2 retainer with Pickwickian syndrome.  Almost back to baseline O2 requirement, currently at 4L    -Diuresis with Lasix gtt and Metolazone; lasix gtt and metolazone paused due to euvolemic state and worsening metabolic alkalosis and Cr bump c/f contraction alkalosis.  -Continuous pulse ox  -Oxygen supplementation with goal SpO2 88-92% given concomitant COPD  -BiPAP QHS

## 2024-01-31 ENCOUNTER — TELEPHONE (OUTPATIENT)
Dept: TRANSPLANT | Facility: CLINIC | Age: 49
End: 2024-01-31

## 2024-01-31 LAB
ALBUMIN SERPL BCP-MCNC: 2.9 G/DL (ref 3.5–5.2)
ALP SERPL-CCNC: 111 U/L (ref 55–135)
ALT SERPL W/O P-5'-P-CCNC: 14 U/L (ref 10–44)
ANION GAP SERPL CALC-SCNC: 12 MMOL/L (ref 8–16)
AST SERPL-CCNC: 21 U/L (ref 10–40)
BASOPHILS # BLD AUTO: 0.06 K/UL (ref 0–0.2)
BASOPHILS NFR BLD: 0.9 % (ref 0–1.9)
BILIRUB SERPL-MCNC: 1.5 MG/DL (ref 0.1–1)
BUN SERPL-MCNC: 91 MG/DL (ref 6–20)
CALCIUM SERPL-MCNC: 10 MG/DL (ref 8.7–10.5)
CHLORIDE SERPL-SCNC: 85 MMOL/L (ref 95–110)
CO2 SERPL-SCNC: 38 MMOL/L (ref 23–29)
CREAT SERPL-MCNC: 2.3 MG/DL (ref 0.5–1.4)
DIFFERENTIAL METHOD BLD: ABNORMAL
EOSINOPHIL # BLD AUTO: 0.2 K/UL (ref 0–0.5)
EOSINOPHIL NFR BLD: 3.1 % (ref 0–8)
ERYTHROCYTE [DISTWIDTH] IN BLOOD BY AUTOMATED COUNT: 21.4 % (ref 11.5–14.5)
EST. GFR  (NO RACE VARIABLE): 34.2 ML/MIN/1.73 M^2
GLUCOSE SERPL-MCNC: 85 MG/DL (ref 70–110)
HCT VFR BLD AUTO: 54 % (ref 40–54)
HGB BLD-MCNC: 15.7 G/DL (ref 14–18)
IMM GRANULOCYTES # BLD AUTO: 0.02 K/UL (ref 0–0.04)
IMM GRANULOCYTES NFR BLD AUTO: 0.3 % (ref 0–0.5)
INR PPP: 1.9 (ref 0.8–1.2)
LYMPHOCYTES # BLD AUTO: 1.2 K/UL (ref 1–4.8)
LYMPHOCYTES NFR BLD: 19 % (ref 18–48)
MAGNESIUM SERPL-MCNC: 2.3 MG/DL (ref 1.6–2.6)
MCH RBC QN AUTO: 25.3 PG (ref 27–31)
MCHC RBC AUTO-ENTMCNC: 29.1 G/DL (ref 32–36)
MCV RBC AUTO: 87 FL (ref 82–98)
MONOCYTES # BLD AUTO: 0.6 K/UL (ref 0.3–1)
MONOCYTES NFR BLD: 9.6 % (ref 4–15)
NEUTROPHILS # BLD AUTO: 4.4 K/UL (ref 1.8–7.7)
NEUTROPHILS NFR BLD: 67.1 % (ref 38–73)
NRBC BLD-RTO: 0 /100 WBC
PHOSPHATE SERPL-MCNC: 4.6 MG/DL (ref 2.7–4.5)
PLATELET # BLD AUTO: 225 K/UL (ref 150–450)
PMV BLD AUTO: 11 FL (ref 9.2–12.9)
POTASSIUM SERPL-SCNC: 3.6 MMOL/L (ref 3.5–5.1)
PROT SERPL-MCNC: 8.3 G/DL (ref 6–8.4)
PROTHROMBIN TIME: 20 SEC (ref 9–12.5)
RBC # BLD AUTO: 6.21 M/UL (ref 4.6–6.2)
SODIUM SERPL-SCNC: 135 MMOL/L (ref 136–145)
WBC # BLD AUTO: 6.49 K/UL (ref 3.9–12.7)

## 2024-01-31 PROCEDURE — C1751 CATH, INF, PER/CENT/MIDLINE: HCPCS | Mod: HCNC | Performed by: INTERNAL MEDICINE

## 2024-01-31 PROCEDURE — 25000003 PHARM REV CODE 250: Mod: HCNC | Performed by: STUDENT IN AN ORGANIZED HEALTH CARE EDUCATION/TRAINING PROGRAM

## 2024-01-31 PROCEDURE — 4A023N6 MEASUREMENT OF CARDIAC SAMPLING AND PRESSURE, RIGHT HEART, PERCUTANEOUS APPROACH: ICD-10-PCS | Performed by: INTERNAL MEDICINE

## 2024-01-31 PROCEDURE — 99900035 HC TECH TIME PER 15 MIN (STAT): Mod: HCNC

## 2024-01-31 PROCEDURE — 99233 SBSQ HOSP IP/OBS HIGH 50: CPT | Mod: 25,HCNC,, | Performed by: INTERNAL MEDICINE

## 2024-01-31 PROCEDURE — 25000003 PHARM REV CODE 250: Mod: HCNC | Performed by: INTERNAL MEDICINE

## 2024-01-31 PROCEDURE — 27000221 HC OXYGEN, UP TO 24 HOURS: Mod: HCNC

## 2024-01-31 PROCEDURE — 25000003 PHARM REV CODE 250: Mod: HCNC

## 2024-01-31 PROCEDURE — 20600001 HC STEP DOWN PRIVATE ROOM: Mod: HCNC

## 2024-01-31 PROCEDURE — 94640 AIRWAY INHALATION TREATMENT: CPT | Mod: HCNC

## 2024-01-31 PROCEDURE — 80053 COMPREHEN METABOLIC PANEL: CPT | Mod: HCNC

## 2024-01-31 PROCEDURE — 84100 ASSAY OF PHOSPHORUS: CPT | Mod: HCNC

## 2024-01-31 PROCEDURE — 83735 ASSAY OF MAGNESIUM: CPT | Mod: HCNC

## 2024-01-31 PROCEDURE — 94761 N-INVAS EAR/PLS OXIMETRY MLT: CPT | Mod: HCNC

## 2024-01-31 PROCEDURE — 93005 ELECTROCARDIOGRAM TRACING: CPT | Mod: HCNC

## 2024-01-31 PROCEDURE — 85610 PROTHROMBIN TIME: CPT | Mod: HCNC

## 2024-01-31 PROCEDURE — C1894 INTRO/SHEATH, NON-LASER: HCPCS | Mod: HCNC | Performed by: INTERNAL MEDICINE

## 2024-01-31 PROCEDURE — 36415 COLL VENOUS BLD VENIPUNCTURE: CPT | Mod: HCNC

## 2024-01-31 PROCEDURE — 93451 RIGHT HEART CATH: CPT | Mod: 26,HCNC,, | Performed by: INTERNAL MEDICINE

## 2024-01-31 PROCEDURE — 93010 ELECTROCARDIOGRAM REPORT: CPT | Mod: HCNC,,, | Performed by: INTERNAL MEDICINE

## 2024-01-31 PROCEDURE — 85025 COMPLETE CBC W/AUTO DIFF WBC: CPT | Mod: HCNC | Performed by: STUDENT IN AN ORGANIZED HEALTH CARE EDUCATION/TRAINING PROGRAM

## 2024-01-31 PROCEDURE — 93451 RIGHT HEART CATH: CPT | Mod: HCNC | Performed by: INTERNAL MEDICINE

## 2024-01-31 RX ORDER — FUROSEMIDE 80 MG/1
80 TABLET ORAL 2 TIMES DAILY
Status: DISCONTINUED | OUTPATIENT
Start: 2024-01-31 | End: 2024-02-01

## 2024-01-31 RX ORDER — LIDOCAINE HYDROCHLORIDE 20 MG/ML
INJECTION, SOLUTION INFILTRATION; PERINEURAL
Status: DISCONTINUED | OUTPATIENT
Start: 2024-01-31 | End: 2024-02-02 | Stop reason: HOSPADM

## 2024-01-31 RX ADMIN — WARFARIN SODIUM 5 MG: 5 TABLET ORAL at 05:01

## 2024-01-31 RX ADMIN — MONTELUKAST 10 MG: 10 TABLET, FILM COATED ORAL at 08:01

## 2024-01-31 RX ADMIN — TIOTROPIUM BROMIDE INHALATION SPRAY 2 PUFF: 3.12 SPRAY, METERED RESPIRATORY (INHALATION) at 08:01

## 2024-01-31 RX ADMIN — FLUTICASONE FUROATE AND VILANTEROL TRIFENATATE 1 PUFF: 100; 25 POWDER RESPIRATORY (INHALATION) at 08:01

## 2024-01-31 RX ADMIN — DIPHENHYDRAMINE HYDROCHLORIDE 25 MG: 25 CAPSULE ORAL at 08:01

## 2024-01-31 RX ADMIN — FUROSEMIDE 80 MG: 80 TABLET ORAL at 05:01

## 2024-01-31 RX ADMIN — METOPROLOL TARTRATE 25 MG: 25 TABLET, FILM COATED ORAL at 08:01

## 2024-01-31 RX ADMIN — PANTOPRAZOLE SODIUM 40 MG: 40 TABLET, DELAYED RELEASE ORAL at 08:01

## 2024-01-31 NOTE — ASSESSMENT & PLAN NOTE
Patient is identified as having Diastolic (HFpEF) heart failure that is Acute on chronic. CHF is currently uncontrolled due to Continued edema of extremities, >3 pillow orthopnea, and Rales/crackles on pulmonary exam. CXR with underlying edema.    Echo (01/19)   Left Ventricle: The left ventricle is normal in size. Normal wall thickness. Normal wall motion. Septal flattening in systole consistent with right ventricular pressure overload. There is normal systolic function. Ejection fraction by visual approximation is 55%. There is normal diastolic function.    Right Ventricle: Severe right ventricular enlargement. Wall thickness is normal. Right ventricle wall motion has global hypokinesis. Systolic function is severely reduced.    Tricuspid Valve: There is moderate to severe regurgitation.    Pulmonary Artery: There is moderate to severe pulmonary hypertension. The estimated pulmonary artery systolic pressure is 67 mmHg.    IVC/SVC: Intermediate venous pressure at 8 mmHg.    Pericardium: There is a trivial effusion.    Recent Labs   Lab 01/18/24  2216   BNP 1,073*     Improving. Now near euvolemia.       - diuresis remains paused since patient appears to be euvolemic  - RHC done today; pHTN WHO group 3  - Continue Beta Blocker, rest of GDMT is precluded by AARON    - Monitor strict Is&Os and daily weights.    - Place on fluid restriction of 1.5 L.   - Low salt diet

## 2024-01-31 NOTE — PROGRESS NOTES
.Patient tolerated the procedure well. Please see complete RHC procedure note for full details and results. Stable to return from cath lab to their hospital room.  Procedure: Right heart catheterization   Procedure date: 01/31/24     Discharge date: 01/31/24  Estimated blood loss: less than 10 cc  Complications: none  Condition at discharge: stable  Discharge instructions: no heavy lifting greater than 5 lbs and no bearing down/coughing without holding pressure over incision site.  Activity: as tolerated  Diet: as tolerated  Dispo: hospital room

## 2024-01-31 NOTE — NURSING
Patient's O2 sat was 83% on room air at rest, on 3.5 L/min nasal cannula O2 sats came up to 93% at rest. Ambulated with patient to end of hallway and back and by the time we got  back to the room his O2 sats were 80% on 6 L/min nasal cannula and he was dizzy. He said that's the most walking he has done in 2 weeks. He denied shortness of breath. OS sats came back up to 91% on 3.5 L/min nasal cannula with patient sitting in bed.

## 2024-01-31 NOTE — NURSING
Home Oxygen Evaluation    Date Performed: 1/31/2024    1) Patient's Home O2 Sat on room air, while at rest: 83        If O2 sats on room air at rest are 88% or below, patient qualifies. No additional testing needed. Document N/A in steps 2 and 3. If 89% or above, complete steps 2.      2) Patient's O2 Sat on room air while exercising: NA        If O2 sats on room air while exercising remain 89% or above patient does not qualify, no further testing needed Document N/A in step 3. If O2 sats on room air while exercising are 88% or below, continue to step 3.      3) Patient's O2 Sat while exercising on O2: NA         (Must show improvement from #2 for patients to qualify)    If O2 sats improve on oxygen, patient qualifies for portable oxygen. If not, the patient does not qualify.

## 2024-01-31 NOTE — ASSESSMENT & PLAN NOTE
Managed by [facility] pulmonology   Sildenafil recently discontinued  Uses 3L NC at baseline    Plan:  - Consulted HTS, appreciate assistance  - Discussed patient's warfarin vs DOAC option; said he was unable to switch to DOAC due to finances.   - lasix gtt stopped due to worsening metabolic alkalosis and Cr bump c/f contraction alkalosis on 01/30/24.  - Cardiology planning to do RHC today; c/f pHTN WHO group 3  - 6 MWT to assess change in home oxygen need

## 2024-01-31 NOTE — PROGRESS NOTES
[facility] - [facility]iology Stepdown  Heart Transplant  Progress Note  Patient Name: Yong Mcintyre  MRN: [id number]  Admission Date: 1/18/2024  Attending Physician: Berenice Lara MD  Primary Care Provider: Gianni Escalona MD  Principal Problem:Acute on chronic heart failure with preserved ejection fraction (HFpEF)    Subjective:     Interval History:  NAEON. Significant improvement in Hypoxia with diuresis and on 3 l NC( 3 l nc  baseline at home). RHC with significant precapillary Pulmonary HTN( PVR 10). On ambulation to the end of hallway patient feels dizzy and drop in sats to 80 on 6 l Ay rest maintaining satsa >90 on 3l and comfortable.Will consider PAH therapy and resume po diuretics today.      Overview/Hospital Course:  Patient was admitted to  with AHRF in setting of decompensated heart failure and Pulmonary Hypertension. Patient was initially diuresing well with 120mg TID IVP lasix and he was weaned down from 11L NC (on arrival) to 5-6L NC( at home uses  3 L oxygen at baseline), however UOP started gradually decreasing. on lasix drip at 20 mg/hr and daily Metolazone 10mg with good UOP.      HTS service consulted 1/25/24 in the setting of Pulmonary Hypertension and minimal improvement in oxygen requirement.   Past Medical History:   Diagnosis Date    Arthritis     CHF (congestive heart failure)     Diastolic dysfunction    Cor pulmonale 11/05/2012    Gallstones     Hypertension     Morbid obesity 11/05/2012    Obesity hypoventilation syndrome     On home oxygen therapy 03/07/2014    MALLIKA (obstructive sleep apnea)     BPAP 16/11 with 3 L at night (continuous O2).    Paroxysmal atrial fibrillation     PFO (patent foramen ovale) 11/05/2012    status post closure    Pickwickian syndrome 11/05/2012    Pulmonary hypertension        Past Surgical History:   Procedure Laterality Date    RIGHT HEART CATHETERIZATION Right 3/22/2022    Procedure: INSERTION, CATHETER, RIGHT HEART;  Surgeon: Tony Martínez MD;   "Location: [facility] CATH LAB;  Service: Cardiology;  Laterality: Right;       Review of patient's allergies indicates:   Allergen Reactions    Lipitor [atorvastatin] Other (See Comments)     "it makes my legs feel like jelly"  Other reaction(s): causes legs to hurt       Current Facility-Administered Medications   Medication    albuterol inhaler 2 puff    diphenhydrAMINE capsule 25 mg    fluticasone furoate-vilanteroL 100-25 mcg/dose diskus inhaler 1 puff    furosemide tablet 80 mg    LIDOcaine HCL 20 mg/ml (2%) injection    melatonin tablet 6 mg    metoprolol tartrate (LOPRESSOR) tablet 25 mg    montelukast tablet 10 mg    ondansetron disintegrating tablet 4 mg    pantoprazole EC tablet 40 mg    sodium chloride 0.9% flush 10 mL    tiotropium bromide 2.5 mcg/actuation inhaler 2 puff    warfarin (COUMADIN) tablet 5 mg     Family History       Problem Relation (Age of Onset)    Arthritis Mother, Maternal Grandmother, Maternal Grandfather    Asthma Mother, Sister, Maternal Grandmother    Breast cancer Paternal Grandmother    Diabetes Maternal Grandmother    Down syndrome Brother    Gout Brother    Hypertension Father, Maternal Grandmother, Maternal Grandfather, Paternal Grandfather          Tobacco Use    Smoking status: Never    Smokeless tobacco: Never   Substance and Sexual Activity    Alcohol use: Yes     Comment: holidays  rare    Drug use: No    Sexual activity: Not Currently     Partners: Female     Review of Systems  Objective:     Vital Signs (Most Recent):  Temp: 98.7 °F (37.1 °C) (01/31/24 1113)  Pulse: 88 (01/31/24 1120)  Resp: 20 (01/31/24 1113)  BP: 101/64 (01/31/24 1113)  SpO2: 95 % (01/31/24 1113) Vital Signs (24h Range):  Temp:  [96.8 °F (36 °C)-98.7 °F (37.1 °C)] 98.7 °F (37.1 °C)  Pulse:  [] 88  Resp:  [15-20] 20  SpO2:  [92 %-98 %] 95 %  BP: (101-116)/(55-64) 101/64     Weight: 106 kg (233 lb 11 oz)  Body mass index is 39.49 kg/m².      Intake/Output Summary (Last 24 hours) at 1/31/2024 1438  Last " "data filed at 1/31/2024 1103  Gross per 24 hour   Intake 522 ml   Output 900 ml   Net -378 ml         Physical Exam  Gen: NAD  CVS: s1s2. JVD-  Pulm: bibasilar crackles  GI: soft NT  Ext: 1-2+ pitting edema b/l  Significant Labs:  CBC:  Recent Labs   Lab 01/31/24 0312   WBC 6.49   RBC 6.21*   HGB 15.7   HCT 54.0      MCV 87   MCH 25.3*   MCHC 29.1*       BNP:  No results for input(s): "BNP" in the last 72 hours.    Invalid input(s): "BNPTRIAGELBLO"  CMP:  Recent Labs   Lab 01/31/24 0312   GLU 85   CALCIUM 10.0   ALBUMIN 2.9*   PROT 8.3   *   K 3.6   CO2 38*   CL 85*   BUN 91*   CREATININE 2.3*   ALKPHOS 111   ALT 14   AST 21   BILITOT 1.5*        Coagulation:   Recent Labs   Lab 01/31/24 0312   INR 1.9*       LDH:  No results for input(s): "LDH" in the last 72 hours.  Microbiology:  Microbiology Results (last 7 days)       ** No results found for the last 168 hours. **             ABGs: No results for input(s): "PH", "PCO2", "HCO3", "POCSATURATED", "BE" in the last 72 hours.  BMP:   Recent Labs   Lab 01/31/24 0312   GLU 85   *   K 3.6   CL 85*   CO2 38*   BUN 91*   CREATININE 2.3*   CALCIUM 10.0   MG 2.3       Cardiac Markers: No results for input(s): "CKMB", "TROPONINT", "MYOGLOBIN" in the last 72 hours.  Coagulation:   Recent Labs   Lab 01/31/24 0312   INR 1.9*       I have reviewed all pertinent labs within the past 24 hours.      Assessment/Plan:     #Acute on Chronic Hypoxic Hyercapneic respiratory failure( multifactorial- HfpEF/pulm htn/OHS )- on 4 l oxygen( 3l baseline at home)  #AARON on CKD prerenal s/t volume overload.   #Severe Pre capillary Pulmonary Hypertension( Grp 1 and Group 3)  #Severely decreased functional capacity/exercise tolerance s/t PH.  #PFO with unsuccessful closure 2006  #P. Afib on Coumadin for unknown reason. Currently in NSR  #MALLIKA/OHS    [facility] 2022 : RA 5 , PCWP 25, PA 58/28, CI 2.8. PVR 2.63.   TTE 1/19/24: severe RV enlargement, Severely reduced RV function, LVEF " >55%, moderate to severe TR, PASP 67 mm hg.   Significant improvement in Hypoxia( 11l ---to 4l) with diuresis.   RHC 1/31/24: RA 14, RV 84/5, PA 80/38( 50), PWP 15, CO 6.14, CI 2.92. PVR 10.     Recommendations:  -pt appears euvolemic on exam today with no JVD. Will transition to po diuretics today.   - Severely decreased functioning capacity, hypoxic on minimal ambulation. Will consider PAH therapies. -will DC metoprolol.   - on Coumadin for afib. Currently in NSR. INR 1.9  - not taking Bosentan and sildenefil per patient as it was DC'd in November by [facility] pulmonology.       Bj Lamb MD  Heart Transplant  [facility] - [facility]iology Step[facility]

## 2024-01-31 NOTE — ASSESSMENT & PLAN NOTE
Patient with acute kidney injury/acute renal failure likely due to pre-renal azotemia due to IVVD AARON is currently improving. Baseline creatinine  1.5-2.0  - Labs reviewed- Renal function/electrolytes with Estimated Creatinine Clearance: 43.7 mL/min (A) (based on SCr of 2.3 mg/dL (H)). according to latest data. Monitor urine output and serial BMP and adjust therapy as needed. Avoid nephrotoxins and renally dose meds for GFR listed above.

## 2024-01-31 NOTE — SUBJECTIVE & OBJECTIVE
Interval History: NAOE. Complained of chest pain, pending EKG. RHC completed today.     Review of Systems  Objective:     Vital Signs (Most Recent):  Temp: 97.9 °F (36.6 °C) (01/31/24 0725)  Pulse: 83 (01/31/24 0811)  Resp: 18 (01/31/24 0811)  BP: (!) 108/58 (01/31/24 0725)  SpO2: 95 % (01/31/24 0811) Vital Signs (24h Range):  Temp:  [96.8 °F (36 °C)-98 °F (36.7 °C)] 97.9 °F (36.6 °C)  Pulse:  [] 83  Resp:  [15-20] 18  SpO2:  [92 %-98 %] 95 %  BP: (102-116)/(55-63) 108/58     Weight: 106 kg (233 lb 11 oz)  Body mass index is 39.49 kg/m².    Intake/Output Summary (Last 24 hours) at 1/31/2024 1057  Last data filed at 1/31/2024 0023  Gross per 24 hour   Intake 522 ml   Output 1100 ml   Net -578 ml         Physical Exam  Vitals and nursing note reviewed.   Constitutional:       General: He is not in acute distress.     Appearance: Normal appearance. He is not ill-appearing.   HENT:      Head: Normocephalic and atraumatic.      Nose: Nose normal.      Mouth/Throat:      Mouth: Mucous membranes are moist.   Eyes:      Extraocular Movements: Extraocular movements intact.      Conjunctiva/sclera: Conjunctivae normal.      Pupils: Pupils are equal, round, and reactive to light.   Cardiovascular:      Rate and Rhythm: Normal rate and regular rhythm.      Pulses: Normal pulses.      Heart sounds: Normal heart sounds. No murmur heard.  Pulmonary:      Effort: Pulmonary effort is normal. No respiratory distress.      Breath sounds: Rales present. No wheezing or rhonchi.      Comments: BLL crackles minimal (improved compared to before)  Abdominal:      General: Abdomen is flat. Bowel sounds are normal.      Palpations: Abdomen is soft.   Musculoskeletal:         General: Swelling present.      Right lower leg: Edema present.      Left lower leg: Edema present.      Comments: LE edema has improved drastically   Skin:     General: Skin is warm and dry.      Capillary Refill: Capillary refill takes less than 2 seconds.    Neurological:      Mental Status: He is alert and oriented to person, place, and time.   Psychiatric:         Mood and Affect: Mood normal.         Behavior: Behavior normal.         Thought Content: Thought content normal.         Judgment: Judgment normal.             Significant Labs: All pertinent labs within the past 24 hours have been reviewed.    Significant Imaging: I have reviewed all pertinent imaging results/findings within the past 24 hours.

## 2024-01-31 NOTE — PLAN OF CARE
Problem: Adult Inpatient Plan of Care  Goal: Plan of Care Review  Outcome: Ongoing, Progressing  Goal: Patient-Specific Goal (Individualized)  Outcome: Ongoing, Progressing  Goal: Absence of Hospital-Acquired Illness or Injury  Outcome: Ongoing, Progressing  Goal: Optimal Comfort and Wellbeing  Outcome: Ongoing, Progressing  Goal: Readiness for Transition of Care  Outcome: Ongoing, Progressing     Problem: Bariatric Environmental Safety  Goal: Safety Maintained with Care  Outcome: Ongoing, Progressing     Problem: Impaired Wound Healing  Goal: Optimal Wound Healing  Outcome: Ongoing, Progressing     Problem: Fluid and Electrolyte Imbalance (Acute Kidney Injury/Impairment)  Goal: Fluid and Electrolyte Balance  Outcome: Ongoing, Progressing     Problem: Oral Intake Inadequate (Acute Kidney Injury/Impairment)  Goal: Optimal Nutrition Intake  Outcome: Ongoing, Progressing     Problem: Renal Function Impairment (Acute Kidney Injury/Impairment)  Goal: Effective Renal Function  Outcome: Ongoing, Progressing     Problem: Adjustment to Illness (Heart Failure)  Goal: Optimal Coping  Outcome: Ongoing, Progressing     Problem: Cardiac Output Decreased (Heart Failure)  Goal: Optimal Cardiac Output  Outcome: Ongoing, Progressing     Problem: Dysrhythmia (Heart Failure)  Goal: Stable Heart Rate and Rhythm  Outcome: Ongoing, Progressing     Problem: Fluid Imbalance (Heart Failure)  Goal: Fluid Balance  Outcome: Ongoing, Progressing     Problem: Functional Ability Impaired (Heart Failure)  Goal: Optimal Functional Ability  Outcome: Ongoing, Progressing     Problem: Oral Intake Inadequate (Heart Failure)  Goal: Optimal Nutrition Intake  Outcome: Ongoing, Progressing     Problem: Respiratory Compromise (Heart Failure)  Goal: Effective Oxygenation and Ventilation  Outcome: Ongoing, Progressing     Problem: Sleep Disordered Breathing (Heart Failure)  Goal: Effective Breathing Pattern During Sleep  Outcome: Ongoing, Progressing      Problem: Fall Injury Risk  Goal: Absence of Fall and Fall-Related Injury  Outcome: Ongoing, Progressing     Problem: Pain Acute  Goal: Acceptable Pain Control and Functional Ability  Outcome: Ongoing, Progressing     Problem: Infection  Goal: Absence of Infection Signs and Symptoms  Outcome: Ongoing, Progressing

## 2024-01-31 NOTE — NURSING
On call MD made aware of critical CO2 at 42. Patient is on his bipap for the night currently and is asymptomatic. No further orders given, will continue to monitor.

## 2024-01-31 NOTE — ASSESSMENT & PLAN NOTE
Patient with Hypercapnic and Hypoxic Respiratory failure which is Acute on chronic.  he is on home oxygen at 3 LPM. CXR with underlying edema.    Likely secondary to CHF exacerbation. Patient is a chronic CO2 retainer with Pickwickian syndrome.  Almost back to baseline O2 requirement, currently at 4L    -stopped diuresis due to euvolemia  -Continuous pulse ox  -Oxygen supplementation with goal SpO2 88-92% given concomitant COPD  -BiPAP QHS

## 2024-01-31 NOTE — PROGRESS NOTES
[facility] - Cardiology [facility]  Progress Note    Patient Name: Yong Mcintyre  MRN: [id number]  Patient Class: IP- Inpatient   Admission Date: 1/18/2024  Length of Stay: 12 days  Attending Physician: Berenice Lara MD  Primary Care Provider: Gianni Escalona MD        Subjective:     Principal Problem:Acute on chronic heart failure with preserved ejection fraction (HFpEF)        HPI:  Patient is a 49 yo male with a PMH of HFpEF, pickwickian syndrome, HTN, pHTN, Chronic hypoxic respiratory failure (baseline oxygen req at home is 3 L), PFO (unsuccessful closure in 2006), AF (on warfarin for unknown reason) who present with worsening leg swelling and shortness of breath for the past 1 week. He has associated symptoms of orthopnea. He was admitted last month with similar symptoms and was discharged on lasix and metolazone and told to weigh himself daily as he may need dosage adjustment of his diuretics. Patient states he has been compliant with his medications with good urine output but has not been weighing himself. He denies any chest pain, nausea, vomiting, lightheadedness, or dizziness. Interview difficult as patient currently on Bipap.    While in the ED: Patient with oxygen saturation at 79%. Labs reveal worsened BNP >1000. New AARON with Cr 2.7 (up from baseline of 1.5). Initial VBG with pH 7.32 and CO2 83. Initially put on BIPAP but weaned off to high flow nasal cannula. ABG with A total 120 mg of IV Lasix given.      Overview/Hospital Course:  Patient was admitted to  with AHRF in setting of decompensated heart failure. Patient was initially diuresing well with 120mg TID IVP lasix and he was weaned down from 11L NC (on arrival) to 5-6L NC, however UOP started gradually decreasing. Now on lasix drip at 15 mg/hr and daily Metolazone 10mg and currently satting well on 4 L. [facility] is following and agree with continued, aggressive diuresis and is tentatively planning for [facility] 01/29 once  patient becomes euvolemic; delayed as patient is not euvolemic. Will continue to diurese and plan to RHC. Diuresis with lasix gtt and metolazone paused since patient appears to euvolemic and alkalosis is worsening with Cr bump. RHC completed 01/31/24.     Interval History: NAOE. Complained of chest pain, pending EKG. RHC completed today.     Review of Systems  Objective:     Vital Signs (Most Recent):  Temp: 97.9 °F (36.6 °C) (01/31/24 0725)  Pulse: 83 (01/31/24 0811)  Resp: 18 (01/31/24 0811)  BP: (!) 108/58 (01/31/24 0725)  SpO2: 95 % (01/31/24 0811) Vital Signs (24h Range):  Temp:  [96.8 °F (36 °C)-98 °F (36.7 °C)] 97.9 °F (36.6 °C)  Pulse:  [] 83  Resp:  [15-20] 18  SpO2:  [92 %-98 %] 95 %  BP: (102-116)/(55-63) 108/58     Weight: 106 kg (233 lb 11 oz)  Body mass index is 39.49 kg/m².    Intake/Output Summary (Last 24 hours) at 1/31/2024 1057  Last data filed at 1/31/2024 0023  Gross per 24 hour   Intake 522 ml   Output 1100 ml   Net -578 ml         Physical Exam  Vitals and nursing note reviewed.   Constitutional:       General: He is not in acute distress.     Appearance: Normal appearance. He is not ill-appearing.   HENT:      Head: Normocephalic and atraumatic.      Nose: Nose normal.      Mouth/Throat:      Mouth: Mucous membranes are moist.   Eyes:      Extraocular Movements: Extraocular movements intact.      Conjunctiva/sclera: Conjunctivae normal.      Pupils: Pupils are equal, round, and reactive to light.   Cardiovascular:      Rate and Rhythm: Normal rate and regular rhythm.      Pulses: Normal pulses.      Heart sounds: Normal heart sounds. No murmur heard.  Pulmonary:      Effort: Pulmonary effort is normal. No respiratory distress.      Breath sounds: Rales present. No wheezing or rhonchi.      Comments: BLL crackles minimal (improved compared to before)  Abdominal:      General: Abdomen is flat. Bowel sounds are normal.      Palpations: Abdomen is soft.   Musculoskeletal:         General:  Swelling present.      Right lower leg: Edema present.      Left lower leg: Edema present.      Comments: LE edema has improved drastically   Skin:     General: Skin is warm and dry.      Capillary Refill: Capillary refill takes less than 2 seconds.   Neurological:      Mental Status: He is alert and oriented to person, place, and time.   Psychiatric:         Mood and Affect: Mood normal.         Behavior: Behavior normal.         Thought Content: Thought content normal.         Judgment: Judgment normal.             Significant Labs: All pertinent labs within the past 24 hours have been reviewed.    Significant Imaging: I have reviewed all pertinent imaging results/findings within the past 24 hours.    Assessment/Plan:      * Acute on chronic heart failure with preserved ejection fraction (HFpEF)  Patient is identified as having Diastolic (HFpEF) heart failure that is Acute on chronic. CHF is currently uncontrolled due to Continued edema of extremities, >3 pillow orthopnea, and Rales/crackles on pulmonary exam. CXR with underlying edema.    Echo (01/19)   Left Ventricle: The left ventricle is normal in size. Normal wall thickness. Normal wall motion. Septal flattening in systole consistent with right ventricular pressure overload. There is normal systolic function. Ejection fraction by visual approximation is 55%. There is normal diastolic function.    Right Ventricle: Severe right ventricular enlargement. Wall thickness is normal. Right ventricle wall motion has global hypokinesis. Systolic function is severely reduced.    Tricuspid Valve: There is moderate to severe regurgitation.    Pulmonary Artery: There is moderate to severe pulmonary hypertension. The estimated pulmonary artery systolic pressure is 67 mmHg.    IVC/SVC: Intermediate venous pressure at 8 mmHg.    Pericardium: There is a trivial effusion.    Recent Labs   Lab 01/18/24  2216   BNP 1,073*     Improving. Now near euvolemia.       - diuresis  remains paused since patient appears to be euvolemic  - RHC done today; pHTN WHO group 3  - Continue Beta Blocker, rest of GDMT is precluded by AARON    - Monitor strict Is&Os and daily weights.    - Place on fluid restriction of 1.5 L.   - Low salt diet     Chronic right-sided heart failure  Repeat Echo showing severe right ventricular enlargement, normal wall thickness, global hypokinesis of right ventricular wall, and systolic function is severely reduced.   - Consulted HTS      Chronic asthmatic bronchitis  - Continue daily Breo + Spireva      Acute kidney injury superimposed on CKD  Patient with acute kidney injury/acute renal failure likely due to pre-renal azotemia due to IVVD AARON is currently improving. Baseline creatinine  1.5-2.0  - Labs reviewed- Renal function/electrolytes with Estimated Creatinine Clearance: 43.7 mL/min (A) (based on SCr of 2.3 mg/dL (H)). according to latest data. Monitor urine output and serial BMP and adjust therapy as needed. Avoid nephrotoxins and renally dose meds for GFR listed above.    Acute on chronic respiratory failure with hypoxia and hypercapnia  Patient with Hypercapnic and Hypoxic Respiratory failure which is Acute on chronic.  he is on home oxygen at 3 LPM. CXR with underlying edema.    Likely secondary to CHF exacerbation. Patient is a chronic CO2 retainer with Pickwickian syndrome.  Almost back to baseline O2 requirement, currently at 4L    -stopped diuresis due to euvolemia  -Continuous pulse ox  -Oxygen supplementation with goal SpO2 88-92% given concomitant COPD  -BiPAP QHS      MALLIKA (obstructive sleep apnea)  -BiPAP QHS      PFO (patent foramen ovale)  Thought to be cause of patient's structural heart disease.  S/p closure attempt in 2006 that ultimately failed      Pulmonary hypertension  Managed by [facility]ology   Sildenafil recently discontinued  Uses 3L NC at baseline    Plan:  - Consulted HTS, appreciate assistance  - Discussed patient's warfarin vs DOAC  option; said he was unable to switch to DOAC due to finances.   - lasix gtt stopped due to worsening metabolic alkalosis and Cr bump c/f contraction alkalosis on 01/30/24.  - Cardiology planning to do RHC today; c/f pHTN WHO group 3  - 6 MWT to assess change in home oxygen need    Pickwickian syndrome  Body mass index is 39.87 kg/m². Morbid obesity complicates all aspects of disease management from diagnostic modalities to treatment. Weight loss encouraged and health benefits explained to patient.     - BiPAP QHS as tolerated         VTE Risk Mitigation (From admission, onward)           Ordered     warfarin (COUMADIN) tablet 5 mg  Daily         01/25/24 1007     IP VTE HIGH RISK PATIENT  Once         01/19/24 0208     Place sequential compression device  Until discontinued         01/19/24 0208                    Discharge Planning   ESTEBAN: 2/2/2024     Code Status: Full Code   Is the patient medically ready for discharge?: No    Reason for patient still in hospital (select all that apply): Patient trending condition and Consult recommendations  Discharge Plan A: Home                  Apple Carmen Gallegos MD  Department of Hospital Medicine   [facility] - Cardiology Step[facility]

## 2024-02-01 LAB
ALBUMIN SERPL BCP-MCNC: 3 G/DL (ref 3.5–5.2)
ALP SERPL-CCNC: 111 U/L (ref 55–135)
ALT SERPL W/O P-5'-P-CCNC: 14 U/L (ref 10–44)
ANION GAP SERPL CALC-SCNC: 12 MMOL/L (ref 8–16)
AST SERPL-CCNC: 20 U/L (ref 10–40)
BILIRUB SERPL-MCNC: 1.2 MG/DL (ref 0.1–1)
BUN SERPL-MCNC: 94 MG/DL (ref 6–20)
CALCIUM SERPL-MCNC: 10.2 MG/DL (ref 8.7–10.5)
CHLORIDE SERPL-SCNC: 87 MMOL/L (ref 95–110)
CO2 SERPL-SCNC: 39 MMOL/L (ref 23–29)
CREAT SERPL-MCNC: 2.2 MG/DL (ref 0.5–1.4)
EST. GFR  (NO RACE VARIABLE): 36 ML/MIN/1.73 M^2
GLUCOSE SERPL-MCNC: 92 MG/DL (ref 70–110)
INR PPP: 3.1 (ref 0.8–1.2)
MAGNESIUM SERPL-MCNC: 2.2 MG/DL (ref 1.6–2.6)
PHOSPHATE SERPL-MCNC: 4.3 MG/DL (ref 2.7–4.5)
POTASSIUM SERPL-SCNC: 3.9 MMOL/L (ref 3.5–5.1)
PROT SERPL-MCNC: 8.8 G/DL (ref 6–8.4)
PROTHROMBIN TIME: 31.4 SEC (ref 9–12.5)
SODIUM SERPL-SCNC: 138 MMOL/L (ref 136–145)

## 2024-02-01 PROCEDURE — 99900035 HC TECH TIME PER 15 MIN (STAT): Mod: HCNC

## 2024-02-01 PROCEDURE — 20600001 HC STEP DOWN PRIVATE ROOM: Mod: HCNC

## 2024-02-01 PROCEDURE — 99233 SBSQ HOSP IP/OBS HIGH 50: CPT | Mod: HCNC,,, | Performed by: INTERNAL MEDICINE

## 2024-02-01 PROCEDURE — 83735 ASSAY OF MAGNESIUM: CPT | Mod: HCNC

## 2024-02-01 PROCEDURE — 27100171 HC OXYGEN HIGH FLOW UP TO 24 HOURS: Mod: HCNC

## 2024-02-01 PROCEDURE — 94660 CPAP INITIATION&MGMT: CPT | Mod: HCNC

## 2024-02-01 PROCEDURE — 84100 ASSAY OF PHOSPHORUS: CPT | Mod: HCNC

## 2024-02-01 PROCEDURE — 25000003 PHARM REV CODE 250: Mod: HCNC | Performed by: STUDENT IN AN ORGANIZED HEALTH CARE EDUCATION/TRAINING PROGRAM

## 2024-02-01 PROCEDURE — 80053 COMPREHEN METABOLIC PANEL: CPT | Mod: HCNC

## 2024-02-01 PROCEDURE — 36415 COLL VENOUS BLD VENIPUNCTURE: CPT | Mod: HCNC

## 2024-02-01 PROCEDURE — 85610 PROTHROMBIN TIME: CPT | Mod: HCNC

## 2024-02-01 PROCEDURE — 94799 UNLISTED PULMONARY SVC/PX: CPT | Mod: HCNC

## 2024-02-01 PROCEDURE — 94761 N-INVAS EAR/PLS OXIMETRY MLT: CPT | Mod: HCNC

## 2024-02-01 PROCEDURE — 25000003 PHARM REV CODE 250: Mod: HCNC

## 2024-02-01 PROCEDURE — 94640 AIRWAY INHALATION TREATMENT: CPT | Mod: HCNC

## 2024-02-01 RX ORDER — FUROSEMIDE 80 MG/1
80 TABLET ORAL 2 TIMES DAILY
Qty: 60 TABLET | Refills: 1 | OUTPATIENT
Start: 2024-02-01 | End: 2024-04-01

## 2024-02-01 RX ORDER — TORSEMIDE 20 MG/1
60 TABLET ORAL 2 TIMES DAILY
Status: DISCONTINUED | OUTPATIENT
Start: 2024-02-01 | End: 2024-02-02 | Stop reason: HOSPADM

## 2024-02-01 RX ORDER — TORSEMIDE 20 MG/1
60 TABLET ORAL 2 TIMES DAILY
Status: DISCONTINUED | OUTPATIENT
Start: 2024-02-01 | End: 2024-02-01

## 2024-02-01 RX ADMIN — TORSEMIDE 60 MG: 20 TABLET ORAL at 04:02

## 2024-02-01 RX ADMIN — MONTELUKAST 10 MG: 10 TABLET, FILM COATED ORAL at 08:02

## 2024-02-01 RX ADMIN — TIOTROPIUM BROMIDE INHALATION SPRAY 2 PUFF: 3.12 SPRAY, METERED RESPIRATORY (INHALATION) at 10:02

## 2024-02-01 RX ADMIN — PANTOPRAZOLE SODIUM 40 MG: 40 TABLET, DELAYED RELEASE ORAL at 08:02

## 2024-02-01 RX ADMIN — FLUTICASONE FUROATE AND VILANTEROL TRIFENATATE 1 PUFF: 100; 25 POWDER RESPIRATORY (INHALATION) at 10:02

## 2024-02-01 RX ADMIN — WARFARIN SODIUM 5 MG: 5 TABLET ORAL at 04:02

## 2024-02-01 RX ADMIN — FUROSEMIDE 80 MG: 80 TABLET ORAL at 08:02

## 2024-02-01 RX ADMIN — DIPHENHYDRAMINE HYDROCHLORIDE 25 MG: 25 CAPSULE ORAL at 08:02

## 2024-02-01 NOTE — PROGRESS NOTES
[facility] - Cardiology [facility] Medicine  Progress Note    Patient Name: Yong Mcintyre  MRN: [id number]  Patient Class: IP- Inpatient   Admission Date: 1/18/2024  Length of Stay: 13 days  Attending Physician: Berenice Lara MD  Primary Care Provider: Gianni Escalona MD        Subjective:     Principal Problem:Acute on chronic heart failure with preserved ejection fraction (HFpEF)        HPI:  Patient is a 49 yo male with a PMH of HFpEF, pickwickian syndrome, HTN, pHTN, Chronic hypoxic respiratory failure (baseline oxygen req at home is 3 L), PFO (unsuccessful closure in 2006), AF (on warfarin for unknown reason) who present with worsening leg swelling and shortness of breath for the past 1 week. He has associated symptoms of orthopnea. He was admitted last month with similar symptoms and was discharged on lasix and metolazone and told to weigh himself daily as he may need dosage adjustment of his diuretics. Patient states he has been compliant with his medications with good urine output but has not been weighing himself. He denies any chest pain, nausea, vomiting, lightheadedness, or dizziness. Interview difficult as patient currently on Bipap.    While in the ED: Patient with oxygen saturation at 79%. Labs reveal worsened BNP >1000. New AARON with Cr 2.7 (up from baseline of 1.5). Initial VBG with pH 7.32 and CO2 83. Initially put on BIPAP but weaned off to high flow nasal cannula. ABG with A total 120 mg of IV Lasix given.      Overview/Hospital Course:  Patient was admitted to  with AHRF in setting of decompensated heart failure. Patient was initially diuresing well with 120mg TID IVP lasix and he was weaned down from 11L NC (on arrival) to 5-6L NC, however UOP started gradually decreasing. Now on lasix drip at 15 mg/hr and daily Metolazone 10mg and currently satting well on 4 L. [facility] is following and agree with continued, aggressive diuresis and is tentatively planning for [facility] 01/29 once  patient becomes euvolemic; delayed as patient is not euvolemic. Will continue to diurese and plan to RHC. Diuresis with lasix gtt and metolazone paused since patient appears to euvolemic and alkalosis is worsening with Cr bump. RHC completed 01/31/24. Patient ith PAH group 3, [facility] recommend starting therapies outpatient. Discontinued BB due to PAH group 3; BB worsens his known RV dysfunction. Placed on lasix PO 80 mg BID, however due to low UOP was switched to Torsemide PO 60mg BID. If Cr is stable tomorrow, then [facility] recommends discharge.    Interval History: NAOE. Reports no chest pain, SOB, palpitations. Thinks the swelling has improved since admission. Is somewhat interested in home health.     Review of Systems  Objective:     Vital Signs (Most Recent):  Temp: 98.3 °F (36.8 °C) (02/01/24 1208)  Pulse: 96 (02/01/24 1208)  Resp: 18 (02/01/24 1208)  BP: (!) 101/58 (02/01/24 1208)  SpO2: 95 % (02/01/24 1208) Vital Signs (24h Range):  Temp:  [97.5 °F (36.4 °C)-98.7 °F (37.1 °C)] 98.3 °F (36.8 °C)  Pulse:  [] 96  Resp:  [18-20] 18  SpO2:  [91 %-98 %] 95 %  BP: ()/(55-69) 101/58     Weight: 105.6 kg (232 lb 12.9 oz)  Body mass index is 39.34 kg/m².    Intake/Output Summary (Last 24 hours) at 2/1/2024 1233  Last data filed at 2/1/2024 0845  Gross per 24 hour   Intake 320 ml   Output 1075 ml   Net -755 ml         Physical Exam  Vitals and nursing note reviewed.   Constitutional:       General: He is not in acute distress.     Appearance: Normal appearance. He is not ill-appearing.   HENT:      Head: Normocephalic and atraumatic.      Nose: Nose normal.      Mouth/Throat:      Mouth: Mucous membranes are moist.   Eyes:      Extraocular Movements: Extraocular movements intact.      Conjunctiva/sclera: Conjunctivae normal.      Pupils: Pupils are equal, round, and reactive to light.   Cardiovascular:      Rate and Rhythm: Normal rate and regular rhythm.      Pulses: Normal pulses.      Heart sounds: Normal heart  sounds. No murmur heard.  Pulmonary:      Effort: Pulmonary effort is normal. No respiratory distress.      Breath sounds: No wheezing, rhonchi or rales.   Abdominal:      General: Abdomen is flat. Bowel sounds are normal.      Palpations: Abdomen is soft.   Musculoskeletal:         General: Swelling present.      Right lower leg: Edema present.      Left lower leg: Edema present.      Comments: LE edema has improved drastically   Skin:     General: Skin is warm and dry.      Capillary Refill: Capillary refill takes less than 2 seconds.   Neurological:      General: No focal deficit present.      Mental Status: He is alert and oriented to person, place, and time.   Psychiatric:         Mood and Affect: Mood normal.         Behavior: Behavior normal.         Thought Content: Thought content normal.         Judgment: Judgment normal.             Significant Labs: All pertinent labs within the past 24 hours have been reviewed.    Significant Imaging: I have reviewed all pertinent imaging results/findings within the past 24 hours.    Assessment/Plan:      * Acute on chronic heart failure with preserved ejection fraction (HFpEF)  Patient is identified as having Diastolic (HFpEF) heart failure that is Acute on chronic. CHF is currently uncontrolled due to Continued edema of extremities, >3 pillow orthopnea, and Rales/crackles on pulmonary exam. CXR with underlying edema.    Echo (01/19)   Left Ventricle: The left ventricle is normal in size. Normal wall thickness. Normal wall motion. Septal flattening in systole consistent with right ventricular pressure overload. There is normal systolic function. Ejection fraction by visual approximation is 55%. There is normal diastolic function.    Right Ventricle: Severe right ventricular enlargement. Wall thickness is normal. Right ventricle wall motion has global hypokinesis. Systolic function is severely reduced.    Tricuspid Valve: There is moderate to severe regurgitation.     Pulmonary Artery: There is moderate to severe pulmonary hypertension. The estimated pulmonary artery systolic pressure is 67 mmHg.    IVC/SVC: Intermediate venous pressure at 8 mmHg.    Pericardium: There is a trivial effusion.    Recent Labs   Lab 01/18/24  2216   BNP 1,073*     Improving. Now near euvolemia.       - started torsemide 60mg PO BID per HTS recs; since lasix 80mg PO BID did not yield an adequate UOP  - RHC done today; pHTN WHO group 3  - Discontinued Beta Blocker per HTS due to PAH group 3  - Monitor strict Is&Os and daily weights.    - Place on fluid restriction of 1.5 L.   - Low salt diet     Chronic right-sided heart failure  Repeat Echo showing severe right ventricular enlargement, normal wall thickness, global hypokinesis of right ventricular wall, and systolic function is severely reduced.   - Consulted HTS      Chronic asthmatic bronchitis  - Continue daily Breo + Spireva      Acute kidney injury superimposed on CKD  Patient with acute kidney injury/acute renal failure likely due to pre-renal azotemia due to IVVD AARON is currently improving. Baseline creatinine  1.5-2.0  - Labs reviewed- Renal function/electrolytes with Estimated Creatinine Clearance: 43.7 mL/min (A) (based on SCr of 2.3 mg/dL (H)). according to latest data. Monitor urine output and serial BMP and adjust therapy as needed. Avoid nephrotoxins and renally dose meds for GFR listed above.    Acute on chronic respiratory failure with hypoxia and hypercapnia  Patient with Hypercapnic and Hypoxic Respiratory failure which is Acute on chronic.  he is on home oxygen at 3 LPM. CXR with underlying edema.    Likely secondary to CHF exacerbation. Patient is a chronic CO2 retainer with Pickwickian syndrome.  Almost back to baseline O2 requirement, currently at 4L    - PO torsemide 60mg Bid started  -Continuous pulse ox  -Oxygen supplementation with goal SpO2 88-92% given concomitant COPD  -BiPAP QHS      MALLIKA (obstructive sleep apnea)  -BiPAP  QHS      PFO (patent foramen ovale)  Thought to be cause of patient's structural heart disease.  S/p closure attempt in 2006 that ultimately failed      Pulmonary hypertension  Managed by [facility] pulmonology   Sildenafil recently discontinued  Uses 3L NC at baseline    Plan:  - Consulted HTS, appreciate assistance  - Discussed patient's warfarin vs DOAC option; said he was unable to switch to DOAC due to finances.   - lasix gtt stopped due to worsening metabolic alkalosis and Cr bump c/f contraction alkalosis on 01/30/24.  - Cardiology planning to do RHC today; c/f pHTN WHO group 3  - 6 MWT completed  - HTS recommends exploration of PAH tx outpatient    Pickwickian syndrome  Body mass index is 39.87 kg/m². Morbid obesity complicates all aspects of disease management from diagnostic modalities to treatment. Weight loss encouraged and health benefits explained to patient.     - BiPAP QHS as tolerated         VTE Risk Mitigation (From admission, onward)           Ordered     warfarin (COUMADIN) tablet 5 mg  Daily         01/25/24 1007     IP VTE HIGH RISK PATIENT  Once         01/19/24 0208     Place sequential compression device  Until discontinued         01/19/24 0208                    Discharge Planning   ESTEBAN: 2/2/2024     Code Status: Full Code   Is the patient medically ready for discharge?: No    Reason for patient still in hospital (select all that apply): Patient trending condition, Laboratory test, and Consult recommendations  Discharge Plan A: Home                  Apple Gallegos MD  Department of Hospital Medicine   [facility] - Cardiology Stepdown

## 2024-02-01 NOTE — PLAN OF CARE
49M with HFpEF, pHTN of unclear etiology with resultant RV failure, on 3L home O2 and BiPAP qhs, CKD3, HTN, PFO presented on 1/18 with dyspnea and SERENA, found to be in ADHF.     ADHF, pHTN, RV failure, chronic hypoxic respiratory failure - has required a lasix drip up to 20mg/hr + metolazone this admission and is ~ -25L. Heart transplant team following and [place] [facility] Unclear why no longer on pHTN therapy but was in the past. Underwent RHC 12/31 c/w severe precapillary pHTN at rest likely WHO Group 3. Desatted to 80% with dizziness while ambulating on 6L NC on 1/31. However, patient notes that more exertion than he typically does at home. Stop BB per HTS team due to pHTN. Change PO lasix to torsemide 60mg BID today due to suboptimal response; assess UOP and possible discharge tomorrow.      Chronic bronchitis - continue home ICS-LABA and tiotropium     AARON on CKD3 - improved with diuresis since admission. Cr slightly creeping back up but largely stable.     pAF - continue tartrate, coumadin, daily INR    Dispo: likely discharge tomorrow with [facility] and advanced heart failure referrals

## 2024-02-01 NOTE — PLAN OF CARE
[facility] - Cardiology Stepdown      HOME HEALTH ORDERS  FACE TO FACE ENCOUNTER    Patient Name: Yong Mcintyre  YOB: 1975    PCP: Gianni Escalona MD   PCP Address: [facility], [address] / [place] [address]  PCP Phone Number: [phone]  PCP Fax: [phone]    Encounter Date: 1/18/24    Admit to Home Health    Diagnoses:  Active Hospital Problems    Diagnosis  POA    *Acute on chronic heart failure with preserved ejection fraction (HFpEF) [I50.33]  Yes    Chronic right-sided heart failure [I50.812]  Yes     Chronic    Chronic asthmatic bronchitis [J44.89]  Yes     Chronic    Acute kidney injury superimposed on CKD [N17.9, N18.9]  Yes    Acute on chronic respiratory failure with hypoxia and hypercapnia [J96.21, J96.22]  Yes    MALLIKA (obstructive sleep apnea) [G47.33]  Yes     Chronic    Pickwickian syndrome [E66.2]  Yes     Chronic    Pulmonary hypertension [I27.20]  Yes     Chronic    PFO (patent foramen ovale) [Q21.12]  Not Applicable     Chronic      Resolved Hospital Problems    Diagnosis Date Resolved POA    Epigastric abdominal pain [R10.13] 01/27/2024 Yes    Cor pulmonale [I27.81] 01/19/2024 Yes    Obesity hypoventilation syndrome [E66.2] 01/19/2024 Yes     Chronic     Formatting of this note might be different from the original. Last Assessment & Plan: Formatting of this note might be different from the original. Morbid obesity complicates all aspects of disease management from diagnostic modalities to treatment. Weight loss encouraged and health benefits explained to patient. - BiPAP QHS as tolerated         Follow Up Appointments:  Future Appointments   Date Time Provider Department Center   2/2/2024  2:00 PM Soniya Enciso PA-C [facility] CARDIO [facility]   2/9/2024  1:30 PM LAB, APPOINTMENT [place] [facility] LAB VNP [facility]   2/9/2024  2:00 PM Kiara Sams PA-C [facility] HRTFTC [facility]   2/9/2024  2:00 PM [facility] HEART FAILURE NURSE [facility]   4/4/2024   "3:00 PM Kirt Moreau MD [facility] [place]       Allergies:  Review of patient's allergies indicates:   Allergen Reactions    Lipitor [atorvastatin] Other (See Comments)     "it makes my legs feel like jelly"  Other reaction(s): causes legs to hurt       Medications: Review discharge medications with patient and family and provide education.    Current Facility-Administered Medications   Medication Dose Route Frequency Provider Last Rate Last Admin    albuterol inhaler 2 puff  2 puff Inhalation Q4H PRN Joseph Gonzalez DO        diphenhydrAMINE capsule 25 mg  25 mg Oral Nightly PRN Jeff Lemon MD   25 mg at 01/31/24 2017    fluticasone furoate-vilanteroL 100-25 mcg/dose diskus inhaler 1 puff  1 puff Inhalation Daily Joseph Gonzalez DO   1 puff at 01/31/24 0811    furosemide tablet 80 mg  80 mg Oral BID loop Berenice Lara MD   80 mg at 02/01/24 0847    LIDOcaine HCL 20 mg/ml (2%) injection    PRN Sheri Ritter MD   20 mL at 01/31/24 0912    melatonin tablet 6 mg  6 mg Oral Nightly PRN Torrie Archibald MD   6 mg at 01/22/24 2229    montelukast tablet 10 mg  10 mg Oral QHS Joseph Gonzalez DO   10 mg at 01/31/24 2017    ondansetron disintegrating tablet 4 mg  4 mg Oral Q8H PRN Joseph Gonzalez DO        pantoprazole EC tablet 40 mg  40 mg Oral Daily Joseph Gonzalez DO   40 mg at 02/01/24 0847    sodium chloride 0.9% flush 10 mL  10 mL Intravenous PRN Joseph Gonzalez DO        tiotropium bromide 2.5 mcg/actuation inhaler 2 puff  2 puff Inhalation Daily Joseph Gonzalez DO   2 puff at 01/31/24 0812    warfarin (COUMADIN) tablet 5 mg  5 mg Oral Daily Campbell Velarde MD   5 mg at 01/31/24 1752     Current Discharge Medication List        CONTINUE these medications which have NOT CHANGED    Details   acetaminophen (TYLENOL ARTHRITIS ORAL) Take 2 tablets by mouth daily as needed (pain).      albuterol (VENTOLIN HFA) 90 mcg/actuation inhaler Inhale 2 puffs into the lungs every 4 (four) hours as " needed for Wheezing. Rescue  Qty: 18 g, Refills: 2    Associated Diagnoses: Chronic pulmonary heart disease      allopurinoL (ZYLOPRIM) 300 MG tablet Take 1 tablet (300 mg total) by mouth once daily.  Qty: 90 tablet, Refills: 3    Associated Diagnoses: Chronic pulmonary heart disease; Primary pulmonary hypertension; Atrial fibrillation, unspecified type; Pulmonary hypertension; PFO (patent foramen ovale); Paroxysmal atrial fibrillation; Cor pulmonale; Pickwickian syndrome; Hyperthyroidism      ascorbic acid (VITAMIN C ORAL) Take 1 tablet by mouth once daily.      b complex vitamins tablet Take 1 tablet by mouth once daily.      CALCIUM ORAL Take 1 capsule by mouth once daily.      furosemide (LASIX) 40 MG tablet Take 80 mg by mouth 2 (two) times daily.      metOLazone (ZAROXOLYN) 2.5 MG tablet TAKE 1 TABLET(2.5 MG) BY MOUTH DAILY UNTIL YOU ARE SEEN BY PCP OR CARDIOLOGY.  Qty: 30 tablet, Refills: 1    Comments: .      metoprolol tartrate (LOPRESSOR) 25 MG tablet TAKE 1 TABLET BY MOUTH TWICE DAILY  Qty: 180 tablet, Refills: 3      multivit,calc,min/FA/K1/lycop (ONE-A-DAY MEN'S COMPLETE ORAL) Take 1 tablet by mouth once daily.      omega-3 fatty acids/fish oil (FISH OIL-OMEGA-3 FATTY ACIDS) 300-1,000 mg capsule Take 1 capsule by mouth once daily.      pantoprazole (PROTONIX) 40 MG tablet Take 1 tablet (40 mg total) by mouth once daily.  Qty: 90 tablet, Refills: 3      tiotropium (SPIRIVA) 18 mcg inhalation capsule Inhale 18 mcg into the lungs once daily.      warfarin (COUMADIN) 10 MG tablet Take 1/2 tablet (5 mg) by mouth daily or as directed by [facility]  Qty: 30 tablet, Refills: 3    Associated Diagnoses: Pickwickian syndrome; Morbid obesity; Chronic cardiopulmonary disease; Cor pulmonale; MALLIKA (obstructive sleep apnea); Chronic pulmonary heart disease; Long term current use of anticoagulant therapy      WIXELA INHUB 250-50 mcg/dose diskus inhaler 1 puff 2 (two) times daily. USE 1 INHALATION BY MOUTH TWICE  DAILY      polyethylene glycol 400 (VISINE DRY EYE RELIEF) 1 % Drop Place 2 drops into both eyes daily as needed (dry eyes).      potassium chloride (MICRO-K) 10 MEQ CpSR Take 10 mEq by mouth 2 (two) times daily.           STOP taking these medications       montelukast (SINGULAIR) 10 mg tablet Comments:   Reason for Stopping:                 I have seen and examined this patient within the last 30 days. My clinical findings that support the need for the home health skilled services and home bound status are the following:no   Weakness/numbness causing balance and gait disturbance due to Heart Failure making it taxing to leave home.     Diet:   cardiac diet, fluid restriction, and 2 gram sodium diet    Labs:  None    Referrals/ Consults  Physical Therapy to evaluate and treat. Evaluate for home safety and equipment needs; Establish/upgrade home exercise program. Perform / instruct on therapeutic exercises, gait training, transfer training, and Range of Motion.  Occupational Therapy to evaluate and treat. Evaluate home environment for safety and equipment needs. Perform/Instruct on transfers, ADL training, ROM, and therapeutic exercises.    Activities:   activity as tolerated    Nursing:   Agency to admit patient within 24 hours of hospital discharge unless specified on physician order or at patient request    SN to complete comprehensive assessment including routine vital signs. Instruct on disease process and s/s of complications to report to MD. Review/verify medication list sent home with the patient at time of discharge  and instruct patient/caregiver as needed. Frequency may be adjusted depending on start of care date.     Skilled nurse to perform up to 3 visits PRN for symptoms related to diagnosis    Notify MD if SBP > 160 or < 90; DBP > 90 or < 50; HR > 120 or < 50; Temp > 101; O2 < 88%; Other:       Ok to schedule additional visits based on staff availability and patient request on consecutive days within  the home health episode.    When multiple disciplines ordered:    Start of Care occurs on Sunday - Wednesday schedule remaining discipline evaluations as ordered on separate consecutive days following the start of care.    Thursday SOC -schedule subsequent evaluations Friday and Monday the following week.     Friday - Saturday SOC - schedule subsequent discipline evaluations on consecutive days starting Monday of the following week.    For all post-discharge communication and subsequent orders please contact patient's primary care physician. If unable to reach primary care physician or do not receive response within 30 minutes, please contact [facility] RN for clinical staff order clarification    Miscellaneous   For Weight Gain > 2-3 lbs in 1 day or 4-6 lbs over 1 week notify PCP:    Home Health Aide:  Physical Therapy Three times weekly and Occupational Therapy Three times weekly    Wound Care Orders  no    I certify that this patient is confined to his home and needs physical therapy and occupational therapy.

## 2024-02-01 NOTE — SUBJECTIVE & OBJECTIVE
Interval History: NAOE. Reports no chest pain, SOB, palpitations. Thinks the swelling has improved since admission. Is somewhat interested in home health.     Review of Systems  Objective:     Vital Signs (Most Recent):  Temp: 98.3 °F (36.8 °C) (02/01/24 1208)  Pulse: 96 (02/01/24 1208)  Resp: 18 (02/01/24 1208)  BP: (!) 101/58 (02/01/24 1208)  SpO2: 95 % (02/01/24 1208) Vital Signs (24h Range):  Temp:  [97.5 °F (36.4 °C)-98.7 °F (37.1 °C)] 98.3 °F (36.8 °C)  Pulse:  [] 96  Resp:  [18-20] 18  SpO2:  [91 %-98 %] 95 %  BP: ()/(55-69) 101/58     Weight: 105.6 kg (232 lb 12.9 oz)  Body mass index is 39.34 kg/m².    Intake/Output Summary (Last 24 hours) at 2/1/2024 1233  Last data filed at 2/1/2024 0845  Gross per 24 hour   Intake 320 ml   Output 1075 ml   Net -755 ml         Physical Exam  Vitals and nursing note reviewed.   Constitutional:       General: He is not in acute distress.     Appearance: Normal appearance. He is not ill-appearing.   HENT:      Head: Normocephalic and atraumatic.      Nose: Nose normal.      Mouth/Throat:      Mouth: Mucous membranes are moist.   Eyes:      Extraocular Movements: Extraocular movements intact.      Conjunctiva/sclera: Conjunctivae normal.      Pupils: Pupils are equal, round, and reactive to light.   Cardiovascular:      Rate and Rhythm: Normal rate and regular rhythm.      Pulses: Normal pulses.      Heart sounds: Normal heart sounds. No murmur heard.  Pulmonary:      Effort: Pulmonary effort is normal. No respiratory distress.      Breath sounds: No wheezing, rhonchi or rales.   Abdominal:      General: Abdomen is flat. Bowel sounds are normal.      Palpations: Abdomen is soft.   Musculoskeletal:         General: Swelling present.      Right lower leg: Edema present.      Left lower leg: Edema present.      Comments: LE edema has improved drastically   Skin:     General: Skin is warm and dry.      Capillary Refill: Capillary refill takes less than 2 seconds.    Neurological:      General: No focal deficit present.      Mental Status: He is alert and oriented to person, place, and time.   Psychiatric:         Mood and Affect: Mood normal.         Behavior: Behavior normal.         Thought Content: Thought content normal.         Judgment: Judgment normal.             Significant Labs: All pertinent labs within the past 24 hours have been reviewed.    Significant Imaging: I have reviewed all pertinent imaging results/findings within the past 24 hours.

## 2024-02-01 NOTE — ASSESSMENT & PLAN NOTE
Patient is identified as having Diastolic (HFpEF) heart failure that is Acute on chronic. CHF is currently uncontrolled due to Continued edema of extremities, >3 pillow orthopnea, and Rales/crackles on pulmonary exam. CXR with underlying edema.    Echo (01/19)   Left Ventricle: The left ventricle is normal in size. Normal wall thickness. Normal wall motion. Septal flattening in systole consistent with right ventricular pressure overload. There is normal systolic function. Ejection fraction by visual approximation is 55%. There is normal diastolic function.    Right Ventricle: Severe right ventricular enlargement. Wall thickness is normal. Right ventricle wall motion has global hypokinesis. Systolic function is severely reduced.    Tricuspid Valve: There is moderate to severe regurgitation.    Pulmonary Artery: There is moderate to severe pulmonary hypertension. The estimated pulmonary artery systolic pressure is 67 mmHg.    IVC/SVC: Intermediate venous pressure at 8 mmHg.    Pericardium: There is a trivial effusion.    Recent Labs   Lab 01/18/24  2216   BNP 1,073*     Improving. Now near euvolemia.       - started torsemide 60mg PO BID per HTS recs; since lasix 80mg PO BID did not yield an adequate UOP  - RHC done today; pHTN WHO group 3  - Discontinued Beta Blocker per HTS due to PAH group 3  - Monitor strict Is&Os and daily weights.    - Place on fluid restriction of 1.5 L.   - Low salt diet

## 2024-02-01 NOTE — PROGRESS NOTES
[facility] - Cardiology Stepdown  Heart Transplant  Progress Note  Patient Name: Yong Mcintyre  MRN: [id number]  Admission Date: 1/18/2024  Attending Physician: Berenice Lara MD  Primary Care Provider: Gianni Escalona MD  Principal Problem:Acute on chronic heart failure with preserved ejection fraction (HFpEF)    Subjective:     Interval History:  NAEON.  Significant improvement in Hypoxia with Diuresis, currently on 3l NC oxygen which is baseline at home.Transition to Po torsemide 60 BID  today and likely DC tomorrow with close follow up with [facility]        Overview/Hospital Course:  Patient was admitted to  with AHRF in setting of decompensated heart failure and Pulmonary Hypertension. Patient was initially diuresing well with 120mg TID IVP lasix and he was weaned down from 11L NC (on arrival) to 5-6L NC( at home uses  3 L oxygen at baseline), however UOP started gradually decreasing. Improved significantly on lasix gtt.     HTS service consulted 1/25/24 in the setting of Pulmonary Hypertension and minimal improvement in oxygen requirement.   Past Medical History:   Diagnosis Date    Arthritis     CHF (congestive heart failure)     Diastolic dysfunction    Cor pulmonale 11/05/2012    Gallstones     Hypertension     Morbid obesity 11/05/2012    Obesity hypoventilation syndrome     On home oxygen therapy 03/07/2014    MALLIKA (obstructive sleep apnea)     BPAP 16/11 with 3 L at night (continuous O2).    Paroxysmal atrial fibrillation     PFO (patent foramen ovale) 11/05/2012    status post closure    Pickwickian syndrome 11/05/2012    Pulmonary hypertension        Past Surgical History:   Procedure Laterality Date    RIGHT HEART CATHETERIZATION Right 3/22/2022    Procedure: INSERTION, CATHETER, RIGHT HEART;  Surgeon: Tony Martínez MD;  Location: [facility] CATH LAB;  Service: Cardiology;  Laterality: Right;    RIGHT HEART CATHETERIZATION Right 1/31/2024    Procedure: INSERTION, CATHETER, RIGHT HEART;   "Surgeon: Sheri Ritter MD;  Location: [facility] CATH LAB;  Service: Cardiology;  Laterality: Right;       Review of patient's allergies indicates:   Allergen Reactions    Lipitor [atorvastatin] Other (See Comments)     "it makes my legs feel like jelly"  Other reaction(s): causes legs to hurt       Current Facility-Administered Medications   Medication    albuterol inhaler 2 puff    diphenhydrAMINE capsule 25 mg    fluticasone furoate-vilanteroL 100-25 mcg/dose diskus inhaler 1 puff    LIDOcaine HCL 20 mg/ml (2%) injection    melatonin tablet 6 mg    montelukast tablet 10 mg    ondansetron disintegrating tablet 4 mg    pantoprazole EC tablet 40 mg    sodium chloride 0.9% flush 10 mL    tiotropium bromide 2.5 mcg/actuation inhaler 2 puff    torsemide tablet 60 mg    warfarin (COUMADIN) tablet 5 mg     Family History       Problem Relation (Age of Onset)    Arthritis Mother, Maternal Grandmother, Maternal Grandfather    Asthma Mother, Sister, Maternal Grandmother    Breast cancer Paternal Grandmother    Diabetes Maternal Grandmother    Down syndrome Brother    Gout Brother    Hypertension Father, Maternal Grandmother, Maternal Grandfather, Paternal Grandfather          Tobacco Use    Smoking status: Never    Smokeless tobacco: Never   Substance and Sexual Activity    Alcohol use: Yes     Comment: holidays  rare    Drug use: No    Sexual activity: Not Currently     Partners: Female     Review of Systems  Objective:     Vital Signs (Most Recent):  Temp: 98.3 °F (36.8 °C) (02/01/24 1208)  Pulse: 96 (02/01/24 1208)  Resp: 18 (02/01/24 1208)  BP: (!) 101/58 (02/01/24 1208)  SpO2: 95 % (02/01/24 1208) Vital Signs (24h Range):  Temp:  [97.5 °F (36.4 °C)-98.7 °F (37.1 °C)] 98.3 °F (36.8 °C)  Pulse:  [] 96  Resp:  [18-20] 18  SpO2:  [91 %-98 %] 95 %  BP: ()/(55-69) 101/58     Weight: 105.6 kg (232 lb 12.9 oz)  Body mass index is 39.34 kg/m².      Intake/Output Summary (Last 24 hours) at 2/1/2024 0248  Last data filed " "at 2/1/2024 1242  Gross per 24 hour   Intake 542 ml   Output 1475 ml   Net -933 ml         Physical Exam  Gen: NAD  CVS: s1s2. JVD-  Pulm: bibasilar crackles  GI: soft NT  Ext: 1 + edema b/l   Significant Labs:  CBC:  Recent Labs   Lab 01/31/24  0312   WBC 6.49   RBC 6.21*   HGB 15.7   HCT 54.0      MCV 87   MCH 25.3*   MCHC 29.1*       BNP:  No results for input(s): "BNP" in the last 72 hours.    Invalid input(s): "BNPTRIAGELBLO"  CMP:  Recent Labs   Lab 02/01/24  0416   GLU 92   CALCIUM 10.2   ALBUMIN 3.0*   PROT 8.8*      K 3.9   CO2 39*   CL 87*   BUN 94*   CREATININE 2.2*   ALKPHOS 111   ALT 14   AST 20   BILITOT 1.2*        Coagulation:   Recent Labs   Lab 02/01/24  0416   INR 3.1*       LDH:  No results for input(s): "LDH" in the last 72 hours.  Microbiology:  Microbiology Results (last 7 days)       ** No results found for the last 168 hours. **             ABGs: No results for input(s): "PH", "PCO2", "HCO3", "POCSATURATED", "BE" in the last 72 hours.  BMP:   Recent Labs   Lab 02/01/24  0416   GLU 92      K 3.9   CL 87*   CO2 39*   BUN 94*   CREATININE 2.2*   CALCIUM 10.2   MG 2.2       Cardiac Markers: No results for input(s): "CKMB", "TROPONINT", "MYOGLOBIN" in the last 72 hours.  Coagulation:   Recent Labs   Lab 02/01/24  0416   INR 3.1*       I have reviewed all pertinent labs within the past 24 hours.      Assessment/Plan:     #Acute on Chronic Hypoxic Hyercapneic respiratory failure( multifactorial- HfpEF/pulm htn/OHS )- on 3 l oxygen currently( 3l baseline at home prior to admission)  #AARON on CKD prerenal s/t volume overload.   #Severe Pre capillary Pulmonary Hypertension( Grp 1 and Group 3)  #Severely decreased functional capacity/exercise tolerance s/t PH.  #PFO with unsuccessful closure 2006  #P. Afib on Coumadin for unknown reason. Currently in NSR  #MALLIKA/OHS    [facility] 2022 : RA 5 , PCWP 25, PA 58/28, CI 2.8. PVR 2.63.   TTE 1/19/24: severe RV enlargement, Severely reduced RV " function, LVEF >55%, moderate to severe TR, PASP 67 mm hg.   Significant improvement in Hypoxia( 11l ---to 4l) with diuresis.   RHC 1/31/24: RA 14, RV 84/5, PA 80/38( 50), PWP 15, CO 6.14, CI 2.92. PVR 10.     Recommendations:  -pt appears euvolemic on exam today with no JVD. Will transition to po torsemide 60 BID. ( Of note his home dose- lasix 80 BID prior to admission). Likely DC tomorrow with close follow up with [facility].  - Decreased functioning capacity, hypoxic on minimal ambulation with increased Oxygen requirements to 6l , but at rest on 3l NC and comfortable( baseline). RHC with worsening PH( grp 1 and 3) compared to previous report. Spoke to Dr. Juni Red who discussed with [facility] and plan to DC patient with close follow up with [facility] to decide on PAH therapies as outpatient.   - Dc'd metoprolol this admission due to severe PH and fatigue/lethargy/lightheaded on minimal ambulation.   - on Coumadin for afib. Currently in NSR.    -counseled patient on diet and fluid restriction.       Bj Lamb MD  Heart Transplant  [facility] - Cardiology Stepdown

## 2024-02-01 NOTE — ASSESSMENT & PLAN NOTE
Managed by [facility]   Sildenafil recently discontinued  Uses 3L NC at baseline    Plan:  - Consulted [facility], appreciate assistance  - Discussed patient's warfarin vs DOAC option; said he was unable to switch to DOAC due to finances.   - lasix gtt stopped due to worsening metabolic alkalosis and Cr bump c/f contraction alkalosis on 01/30/24.  - Cardiology planning to do RHC today; c/f pHTN WHO group 3  - 6 MWT completed  - [facility] recommends exploration of PAH tx outpatient

## 2024-02-01 NOTE — ASSESSMENT & PLAN NOTE
Patient with Hypercapnic and Hypoxic Respiratory failure which is Acute on chronic.  he is on home oxygen at 3 LPM. CXR with underlying edema.    Likely secondary to CHF exacerbation. Patient is a chronic CO2 retainer with Pickwickian syndrome.  Almost back to baseline O2 requirement, currently at 4L    - PO torsemide 60mg Bid started  -Continuous pulse ox  -Oxygen supplementation with goal SpO2 88-92% given concomitant COPD  -BiPAP QHS

## 2024-02-01 NOTE — PLAN OF CARE
02/01/24 1100   Post-Acute Status   Post-Acute Authorization Home Health   Home Health Status Referrals Sent     SW met with pt at bedside to review discharge recommendation of HH and is agreeable to plan.  Pt instructed to identify preference.    Preferred Facility: (if more than 1, listed in order of descending preference)  No preference    Referral blast sent to the following in-network providers:  Kori Cerda   [facility]  [facility]  [facility]  [place]  [facility]  [facility]   [facility]  [facility]  [facility]      Elisa Olivo LMSW  [facility]  [phone]

## 2024-02-02 VITALS
DIASTOLIC BLOOD PRESSURE: 61 MMHG | WEIGHT: 230.63 LBS | BODY MASS INDEX: 38.42 KG/M2 | RESPIRATION RATE: 18 BRPM | HEART RATE: 107 BPM | SYSTOLIC BLOOD PRESSURE: 103 MMHG | HEIGHT: 65 IN | TEMPERATURE: 98 F | OXYGEN SATURATION: 94 %

## 2024-02-02 LAB
ALBUMIN SERPL BCP-MCNC: 2.9 G/DL (ref 3.5–5.2)
ALP SERPL-CCNC: 111 U/L (ref 55–135)
ALT SERPL W/O P-5'-P-CCNC: 13 U/L (ref 10–44)
ANION GAP SERPL CALC-SCNC: 12 MMOL/L (ref 8–16)
AST SERPL-CCNC: 22 U/L (ref 10–40)
BILIRUB SERPL-MCNC: 0.9 MG/DL (ref 0.1–1)
BUN SERPL-MCNC: 74 MG/DL (ref 6–20)
CALCIUM SERPL-MCNC: 10 MG/DL (ref 8.7–10.5)
CHLORIDE SERPL-SCNC: 87 MMOL/L (ref 95–110)
CO2 SERPL-SCNC: 37 MMOL/L (ref 23–29)
CREAT SERPL-MCNC: 1.8 MG/DL (ref 0.5–1.4)
EST. GFR  (NO RACE VARIABLE): 45.9 ML/MIN/1.73 M^2
GLUCOSE SERPL-MCNC: 101 MG/DL (ref 70–110)
INR PPP: 2.7 (ref 0.8–1.2)
MAGNESIUM SERPL-MCNC: 1.8 MG/DL (ref 1.6–2.6)
PHOSPHATE SERPL-MCNC: 4 MG/DL (ref 2.7–4.5)
POTASSIUM SERPL-SCNC: 3 MMOL/L (ref 3.5–5.1)
PROT SERPL-MCNC: 8.3 G/DL (ref 6–8.4)
PROTHROMBIN TIME: 27.2 SEC (ref 9–12.5)
SODIUM SERPL-SCNC: 136 MMOL/L (ref 136–145)

## 2024-02-02 PROCEDURE — 25000003 PHARM REV CODE 250: Mod: HCNC

## 2024-02-02 PROCEDURE — 94640 AIRWAY INHALATION TREATMENT: CPT | Mod: HCNC

## 2024-02-02 PROCEDURE — 94761 N-INVAS EAR/PLS OXIMETRY MLT: CPT | Mod: HCNC

## 2024-02-02 PROCEDURE — 27100171 HC OXYGEN HIGH FLOW UP TO 24 HOURS: Mod: HCNC

## 2024-02-02 PROCEDURE — 36415 COLL VENOUS BLD VENIPUNCTURE: CPT | Mod: HCNC

## 2024-02-02 PROCEDURE — 84100 ASSAY OF PHOSPHORUS: CPT | Mod: HCNC

## 2024-02-02 PROCEDURE — 83735 ASSAY OF MAGNESIUM: CPT | Mod: HCNC

## 2024-02-02 PROCEDURE — 63600175 PHARM REV CODE 636 W HCPCS: Mod: HCNC

## 2024-02-02 PROCEDURE — 85610 PROTHROMBIN TIME: CPT | Mod: HCNC

## 2024-02-02 PROCEDURE — 99233 SBSQ HOSP IP/OBS HIGH 50: CPT | Mod: HCNC,,, | Performed by: INTERNAL MEDICINE

## 2024-02-02 PROCEDURE — 99900035 HC TECH TIME PER 15 MIN (STAT): Mod: HCNC

## 2024-02-02 PROCEDURE — 25000003 PHARM REV CODE 250: Mod: HCNC | Performed by: STUDENT IN AN ORGANIZED HEALTH CARE EDUCATION/TRAINING PROGRAM

## 2024-02-02 PROCEDURE — 80053 COMPREHEN METABOLIC PANEL: CPT | Mod: HCNC

## 2024-02-02 RX ORDER — POTASSIUM CHLORIDE 20 MEQ/1
40 TABLET, EXTENDED RELEASE ORAL EVERY 4 HOURS
Status: COMPLETED | OUTPATIENT
Start: 2024-02-02 | End: 2024-02-02

## 2024-02-02 RX ORDER — TORSEMIDE 20 MG/1
60 TABLET ORAL 2 TIMES DAILY
Qty: 270 TABLET | Refills: 3 | Status: SHIPPED | OUTPATIENT
Start: 2024-02-02 | End: 2025-02-01

## 2024-02-02 RX ORDER — MAGNESIUM SULFATE HEPTAHYDRATE 40 MG/ML
2 INJECTION, SOLUTION INTRAVENOUS ONCE
Status: COMPLETED | OUTPATIENT
Start: 2024-02-02 | End: 2024-02-02

## 2024-02-02 RX ADMIN — FLUTICASONE FUROATE AND VILANTEROL TRIFENATATE 1 PUFF: 100; 25 POWDER RESPIRATORY (INHALATION) at 08:02

## 2024-02-02 RX ADMIN — PANTOPRAZOLE SODIUM 40 MG: 40 TABLET, DELAYED RELEASE ORAL at 08:02

## 2024-02-02 RX ADMIN — TORSEMIDE 60 MG: 20 TABLET ORAL at 08:02

## 2024-02-02 RX ADMIN — POTASSIUM CHLORIDE 40 MEQ: 1500 TABLET, EXTENDED RELEASE ORAL at 12:02

## 2024-02-02 RX ADMIN — MAGNESIUM SULFATE HEPTAHYDRATE 2 G: 40 INJECTION, SOLUTION INTRAVENOUS at 08:02

## 2024-02-02 RX ADMIN — TIOTROPIUM BROMIDE INHALATION SPRAY 2 PUFF: 3.12 SPRAY, METERED RESPIRATORY (INHALATION) at 08:02

## 2024-02-02 RX ADMIN — POTASSIUM CHLORIDE 40 MEQ: 1500 TABLET, EXTENDED RELEASE ORAL at 03:02

## 2024-02-02 NOTE — PLAN OF CARE
02/02/24 1221   Final Note   Assessment Type Final Discharge Note   Anticipated Discharge Disposition Home-Health   What phone number can be called within the next 1-3 days to see how you are doing after discharge? [phone]   Hospital Resources/Appts/Education Provided Provided patient/caregiver with written discharge plan information;Provided education on problems/symptoms using teachback   Post-Acute Status   Post-Acute Authorization Home Health   Home Health Status Set-up Complete/Auth obtained   Discharge Delays None known at this time     Per patient choice , patient to discharge today with [facility]. Orders and D/C summary sent via care [facility].  Did speak with Torrie at [facility] ([phone] ) she stated they will see him Monday or Tuesday . Patient aware.     Damion Jarvis RN    [phone]    Future Appointments   Date Time Provider Department Center   2/2/2024  2:00 PM Soniya Enciso PA-C [facility] CARDIO [facility]   2/9/2024  1:30 PM LAB, APPOINTMENT [facility] LAB VNP [facility]   2/9/2024  2:00 PM Kiara Sams PA-C [facility]   2/9/2024  2:00 PM [facility] HEART FAILURE NURSE [facility]   4/4/2024  3:00 PM Kirt Moreau MD [facility]

## 2024-02-02 NOTE — DISCHARGE SUMMARY
[facility] - Cardiology [facility] Medicine  Discharge Summary      Patient Name: Yong Mcintyre  MRN: [id number]  HUEY: [id number]  Patient Class: IP- Inpatient  Admission Date: 1/18/2024  Hospital Length of Stay: 14 days  Discharge Date and Time:  02/02/2024 11:23 AM  Attending Physician: Berenice Lara MD   Discharging Provider: Umair Faria MD  Primary Care Provider: Gianni Escalona MD  [facility] Medicine Team: [place] [id number] Umair Faria MD  Primary Care Team: [place] [id number]    HPI:   Patient is a 47 yo male with a PMH of HFpEF, pickwickian syndrome, HTN, pHTN, Chronic hypoxic respiratory failure (baseline oxygen req at home is 3 L), PFO (unsuccessful closure in 2006), AF (on warfarin for unknown reason) who present with worsening leg swelling and shortness of breath for the past 1 week. He has associated symptoms of orthopnea. He was admitted last month with similar symptoms and was discharged on lasix and metolazone and told to weigh himself daily as he may need dosage adjustment of his diuretics. Patient states he has been compliant with his medications with good urine output but has not been weighing himself. He denies any chest pain, nausea, vomiting, lightheadedness, or dizziness. Interview difficult as patient currently on Bipap.    While in the ED: Patient with oxygen saturation at 79%. Labs reveal worsened BNP >1000. New AARON with Cr 2.7 (up from baseline of 1.5). Initial VBG with pH 7.32 and CO2 83. Initially put on BIPAP but weaned off to high flow nasal cannula. ABG with A total 120 mg of IV Lasix given.      Procedure(s) (LRB):  INSERTION, CATHETER, RIGHT HEART (Right)      Hospital Course:   Patient was admitted to  with AHRF in setting of decompensated heart failure. Patient was initially diuresing well with 120mg TID IVP lasix and he was weaned down from 11L NC (on arrival) to 5-6L NC, however UOP started gradually decreasing. Now on lasix drip at 15 mg/hr and daily Metolazone  10mg and currently satting well on 4 L. HTS is following and agree with continued, aggressive diuresis and is tentatively planning for RHC 01/29 once patient becomes euvolemic; delayed as patient is not euvolemic. Will continue to diurese and plan to RHC. Diuresis with lasix gtt and metolazone paused since patient appears to euvolemic and alkalosis is worsening with Cr bump. RHC completed 01/31/24. Patient ith PAH group 3, HTS recommend starting therapies outpatient. Discontinued BB due to PAH group 3; BB worsens his known RV dysfunction. Placed on lasix PO 80 mg BID, however due to low UOP was switched to Torsemide PO 60mg BID. Renal function back to baseline, patient stable for discharged to home with close follow-up with [facility] pul[facility], as well as HTS and [facility].      Goals of Care Treatment Preferences:  Code Status: Full Code      Vitals:    02/02/24 1104   BP:    Pulse: 101   Resp:    Temp:          Physical Exam  Vitals and nursing note reviewed.   Constitutional:       General: He is not in acute distress.     Appearance: Normal appearance. He is not ill-appearing.   HENT:      Head: Normocephalic and atraumatic.      Nose: Nose normal.      Mouth/Throat:      Mouth: Mucous membranes are moist.   Eyes:      Extraocular Movements: Extraocular movements intact.      Conjunctiva/sclera: Conjunctivae normal.      Pupils: Pupils are equal, round, and reactive to light.   Cardiovascular:      Rate and Rhythm: Normal rate and regular rhythm.      Pulses: Normal pulses.      Heart sounds: Normal heart sounds. No murmur heard.  Pulmonary:      Effort: Pulmonary effort is normal. No respiratory distress.      Breath sounds: No wheezing, rhonchi or rales.   Abdominal:      General: Abdomen is flat. Bowel sounds are normal.      Palpations: Abdomen is soft.   Musculoskeletal:         General: Swelling present.      Right lower leg: Edema present.      Left lower leg: Edema present.      Comments:  Skin:     General: Skin is warm  and dry.      Capillary Refill: Capillary refill takes less than 2 seconds.   Neurological:      General: No focal deficit present.      Mental Status: He is alert and oriented to person, place, and time.   Psychiatric:         Mood and Affect: Mood normal.         Behavior: Behavior normal.         Thought Content: Thought content normal.         Judgment: Judgment normal.       Consults:   Consults (From admission, onward)          Status Ordering Provider     Inpatient consult to Heart Transplant  Once        Provider:  (Not yet assigned)    Completed PITER EUBANKS     Inpatient consult to Registered Dietitian/Nutritionist  Once        Provider:  (Not yet assigned)    Completed HANNAH PADRON            No new Assessment & Plan notes have been filed under this hospital service since the last note was generated.  Service: Hospital Medicine    Final Active Diagnoses:    Diagnosis Date Noted POA    PRINCIPAL PROBLEM:  Acute on chronic heart failure with preserved ejection fraction (HFpEF) [I50.33] 05/03/2023 Yes    Chronic right-sided heart failure [I50.812] 01/20/2024 Yes     Chronic    Chronic asthmatic bronchitis [J44.89] 12/11/2023 Yes     Chronic    Acute kidney injury superimposed on CKD [N17.9, N18.9] 12/07/2019 Yes    Acute on chronic respiratory failure with hypoxia and hypercapnia [J96.21, J96.22] 02/18/2014 Yes    MALLIKA (obstructive sleep apnea) [G47.33]  Yes     Chronic    Pickwickian syndrome [E66.2] 11/05/2012 Yes     Chronic    Pulmonary hypertension [I27.20] 11/05/2012 Yes     Chronic    PFO (patent foramen ovale) [Q21.12] 11/05/2012 Not Applicable     Chronic      Problems Resolved During this Admission:    Diagnosis Date Noted Date Resolved POA    Epigastric abdominal pain [R10.13] 12/11/2023 01/27/2024 Yes    Cor pulmonale [I27.81] 11/05/2012 01/19/2024 Yes    Obesity hypoventilation syndrome [E66.2] 11/05/2012 01/19/2024 Yes     Chronic       Discharged Condition: stable    Disposition:     Follow  Up:    Patient Instructions:      Ambulatory referral/consult to Heart Failure Transitional Care Clinic   Standing Status: Future   Referral Priority: Routine Referral Type: Consultation   Referral Reason: Specialty Services Required   Requested Specialty: Cardiology   Number of Visits Requested: 1     Ambulatory referral/consult to Adult Advanced Heart Failure   Standing Status: Future   Referral Priority: Routine Referral Type: Transplants   Number of Visits Requested: 1     Ambulatory referral/consult to Nephrology   Standing Status: Future   Referral Priority: Routine Referral Type: Consultation   Referral Reason: Specialty Services Required   Requested Specialty: Nephrology   Number of Visits Requested: 1       Significant Diagnostic Studies:     RHC 1/31/24  RA: 18/ 15/ 14 RV: 84/ 5/ 15 PA: 80/ 38/ 50 PWP: 19/ 18/ 15 .   Cardiac output was 3.74  by Atif. Cardiac index is 1.78 L/min/m2.       Thermodilution CO/CI 6.14/2.92 at rest  Likely reduced CO/CI based on Atif and in setting of moderate to severe tricuspid regurgitation.   Moderately elevated right and mildly elevated left sided filling pressure.   Severe pulmonary hypertension in setting of normal to mildly elevated left sided filling pressure at rest.   TPG  35   PVR  10    SVR 1610 at rest based on Atif calculation   MAP 81    With exercise PA pressures increased to 92/50, mean 60  PCWP increased to 25/26, 24.   Blood pressure 130/87, mean 100.   Were able to get one Atif with exercise which led to 8.12/3.86, however immediately decreased within a minute or two of stopping exercise. Oxygen saturation remained the same.       TTE 1/19/24    Left Ventricle: The left ventricle is normal in size. Normal wall thickness. Normal wall motion. Septal flattening in systole consistent with right ventricular pressure overload. There is normal systolic function. Ejection fraction by visual approximation is 55%. There is normal diastolic function.    Right  Ventricle: Severe right ventricular enlargement. Wall thickness is normal. Right ventricle wall motion has global hypokinesis. Systolic function is severely reduced.    Tricuspid Valve: There is moderate to severe regurgitation.    Pulmonary Artery: There is moderate to severe pulmonary hypertension. The estimated pulmonary artery systolic pressure is 67 mmHg.    IVC/SVC: Intermediate venous pressure at 8 mmHg.    Pericardium: There is a trivial effusion.    X-Ray Chest 1 View   Final Result      No significant changes         Electronically signed by: Tunde Frias MD   Date:    01/29/2024   Time:    12:26      X-Ray Chest 1 View   Final Result      Stable examination.         Electronically signed by: Real Louis MD   Date:    01/18/2024   Time:    23:17            Pending Diagnostic Studies:       None           Medications:  Reconciled Home Medications:      Medication List        START taking these medications      torsemide 60 mg Tab  Take 60 mg by mouth 2 (two) times a day.            CHANGE how you take these medications      warfarin 10 MG tablet  Commonly known as: COUMADIN  Take 1/2 tablet (5 mg) by mouth daily or as directed by [facility]  What changed:   how much to take  how to take this  additional instructions            CONTINUE taking these medications      albuterol 90 mcg/actuation inhaler  Commonly known as: VENTOLIN HFA  Inhale 2 puffs into the lungs every 4 (four) hours as needed for Wheezing. Rescue     allopurinoL 300 MG tablet  Commonly known as: ZYLOPRIM  Take 1 tablet (300 mg total) by mouth once daily.     b complex vitamins tablet  Take 1 tablet by mouth once daily.     CALCIUM ORAL  Take 1 capsule by mouth once daily.     fish oil-omega-3 fatty acids 300-1,000 mg capsule  Take 1 capsule by mouth once daily.     ONE-A-DAY MEN'S COMPLETE ORAL  Take 1 tablet by mouth once daily.     pantoprazole 40 MG tablet  Commonly known as: PROTONIX  Take 1 tablet (40 mg total) by mouth once  daily.     potassium chloride 10 MEQ Cpsr  Commonly known as: MICRO-K  Take 10 mEq by mouth 2 (two) times daily.     tiotropium 18 mcg inhalation capsule  Commonly known as: SPIRIVA  Inhale 18 mcg into the lungs once daily.     TYLENOL ARTHRITIS ORAL  Take 2 tablets by mouth daily as needed (pain).     VISINE DRY EYE RELIEF 1 % Drop  Generic drug: polyethylene glycol 400  Place 2 drops into both eyes daily as needed (dry eyes).     VITAMIN C ORAL  Take 1 tablet by mouth once daily.     WIXELA INHUB 250-50 mcg/dose diskus inhaler  Generic drug: fluticasone-salmeterol 250-50 mcg/dose  1 puff 2 (two) times daily. USE 1 INHALATION BY MOUTH TWICE DAILY            STOP taking these medications      furosemide 40 MG tablet  Commonly known as: LASIX     metOLazone 2.5 MG tablet  Commonly known as: ZAROXOLYN     metoprolol tartrate 25 MG tablet  Commonly known as: LOPRESSOR              Indwelling Lines/Drains at time of discharge:   Lines/Drains/Airways       None                   Time spent on the discharge of patient: 45 minutes         Umair Faria MD  Department of Hospital Medicine  [facility] - Cardiology [facility]

## 2024-02-02 NOTE — PLAN OF CARE
[facility] - Cardiology Stepdown      HOME HEALTH ORDERS  FACE TO FACE ENCOUNTER    Patient Name: Yong Mcintyre  YOB: 1975    PCP: Gianni Escalona MD   PCP Address: [facility], [address] / [place] [address]  PCP Phone Number: [phone]  PCP Fax: [phone]    Encounter Date: 1/18/24    Admit to Home Health    Diagnoses:  Active Hospital Problems    Diagnosis  POA    *Acute on chronic heart failure with preserved ejection fraction (HFpEF) [I50.33]  Yes    Chronic right-sided heart failure [I50.812]  Yes     Chronic    Chronic asthmatic bronchitis [J44.89]  Yes     Chronic    Acute kidney injury superimposed on CKD [N17.9, N18.9]  Yes    Acute on chronic respiratory failure with hypoxia and hypercapnia [J96.21, J96.22]  Yes    MALLIKA (obstructive sleep apnea) [G47.33]  Yes     Chronic    Pickwickian syndrome [E66.2]  Yes     Chronic    Pulmonary hypertension [I27.20]  Yes     Chronic    PFO (patent foramen ovale) [Q21.12]  Not Applicable     Chronic      Resolved Hospital Problems    Diagnosis Date Resolved POA    Epigastric abdominal pain [R10.13] 01/27/2024 Yes    Cor pulmonale [I27.81] 01/19/2024 Yes    Obesity hypoventilation syndrome [E66.2] 01/19/2024 Yes     Chronic     Formatting of this note might be different from the original. Last Assessment & Plan: Formatting of this note might be different from the original. Morbid obesity complicates all aspects of disease management from diagnostic modalities to treatment. Weight loss encouraged and health benefits explained to patient. - BiPAP QHS as tolerated         Follow Up Appointments:  Future Appointments   Date Time Provider Department Center   2/2/2024  2:00 PM Soniya Enciso PA-C [facility] CARDIO [facility]   2/9/2024  1:30 PM LAB, APPOINTMENT [place] [facility] LAB VNP [facility]   2/9/2024  2:00 PM Kiara Sams PA-C [facility] HRTFTC [facility]   2/9/2024  2:00 PM [facility] HEART FAILURE NURSE [facility]   4/4/2024   "3:00 PM Kirt Moreau MD [facility] GASTRO [place]       Allergies:  Review of patient's allergies indicates:   Allergen Reactions    Lipitor [atorvastatin] Other (See Comments)     "it makes my legs feel like jelly"  Other reaction(s): causes legs to hurt       Medications: Review discharge medications with patient and family and provide education.      I have seen and examined this patient within the last 30 days. My clinical findings that support the need for the home health skilled services and home bound status are the following:no   Weakness/numbness causing balance and gait disturbance due to Heart Failure making it taxing to leave home.  Requiring assistive device to leave home due to unsteady gait caused by  Heart Failure.     Diet:   cardiac diet      Referrals/ Consults  Physical Therapy to evaluate and treat. Evaluate for home safety and equipment needs; Establish/upgrade home exercise program. Perform / instruct on therapeutic exercises, gait training, transfer training, and Range of Motion.    Activities:   activity as tolerated      Miscellaneous   Heart Failure:      SN to instruct on the following:    Instruct on the definition of CHF.   Instruct on the signs/sympoms of CHF to be reported.   Instruct on and monitor daily weights.   Instruct on factors that cause exacerbation.   Instruct on action, dose, schedule, and side effects of medications.   Instruct on diet as prescribed.   Instruct on activity allowed.   Instruct on life-style modifications for life long management of CHF   SN to assess compliance with daily weights, diet, medications, fluid retention,    safety precautions, activities permitted and life-style modifications.   Additional 1-2 SN visits per week as needed for signs and symptoms     of CHF exacerbation.      For Weight Gain > 2-3 lbs in 1 day or 4-6 lbs over 1 week notify PCP:    Home Health Aide:  Physical Therapy Three times weekly      I certify that this patient is " confined to his home and needs physical therapy.

## 2024-02-02 NOTE — PROGRESS NOTES
[facility] - Cardiology Stepdown  Heart Transplant  Progress Note  Patient Name: Yong Mcintyre  MRN: [id number]  Admission Date: 1/18/2024  Attending Physician: Berenice Lara MD  Primary Care Provider: Gianni Escalona MD  Principal Problem:Acute on chronic heart failure with preserved ejection fraction (HFpEF)    Subjective:     Interval History:  NAEON.  Plan for DC home today on Torsemide 60 BID.   With Close follow up with [facility].        Overview/Hospital Course:  Patient was admitted to  with AHRF in setting of decompensated heart failure and Pulmonary Hypertension. Patient was initially diuresing well with 120mg TID IVP lasix and he was weaned down from 11L NC (on arrival) to 5-6L NC( at home uses  3 L oxygen at baseline), however UOP started gradually decreasing. Improved significantly on lasix gtt and back to his baseline on 3l NC.     HTS service consulted 1/25/24 in the setting of Pulmonary Hypertension and minimal improvement in oxygen requirement.   Past Medical History:   Diagnosis Date    Arthritis     CHF (congestive heart failure)     Diastolic dysfunction    Cor pulmonale 11/05/2012    Gallstones     Hypertension     Morbid obesity 11/05/2012    Obesity hypoventilation syndrome     On home oxygen therapy 03/07/2014    MALLIKA (obstructive sleep apnea)     BPAP 16/11 with 3 L at night (continuous O2).    Paroxysmal atrial fibrillation     PFO (patent foramen ovale) 11/05/2012    status post closure    Pickwickian syndrome 11/05/2012    Pulmonary hypertension        Past Surgical History:   Procedure Laterality Date    RIGHT HEART CATHETERIZATION Right 3/22/2022    Procedure: INSERTION, CATHETER, RIGHT HEART;  Surgeon: Tony Martínez MD;  Location: [facility] CATH LAB;  Service: Cardiology;  Laterality: Right;    RIGHT HEART CATHETERIZATION Right 1/31/2024    Procedure: INSERTION, CATHETER, RIGHT HEART;  Surgeon: Sheri Ritter MD;  Location: [facility] CATH LAB;  Service: Cardiology;   "Laterality: Right;       Review of patient's allergies indicates:   Allergen Reactions    Lipitor [atorvastatin] Other (See Comments)     "it makes my legs feel like jelly"  Other reaction(s): causes legs to hurt       Current Facility-Administered Medications   Medication    albuterol inhaler 2 puff    diphenhydrAMINE capsule 25 mg    fluticasone furoate-vilanteroL 100-25 mcg/dose diskus inhaler 1 puff    LIDOcaine HCL 20 mg/ml (2%) injection    melatonin tablet 6 mg    montelukast tablet 10 mg    ondansetron disintegrating tablet 4 mg    pantoprazole EC tablet 40 mg    potassium chloride SA CR tablet 40 mEq    sodium chloride 0.9% flush 10 mL    tiotropium bromide 2.5 mcg/actuation inhaler 2 puff    torsemide tablet 60 mg    warfarin (COUMADIN) tablet 5 mg     Family History       Problem Relation (Age of Onset)    Arthritis Mother, Maternal Grandmother, Maternal Grandfather    Asthma Mother, Sister, Maternal Grandmother    Breast cancer Paternal Grandmother    Diabetes Maternal Grandmother    Down syndrome Brother    Gout Brother    Hypertension Father, Maternal Grandmother, Maternal Grandfather, Paternal Grandfather          Tobacco Use    Smoking status: Never    Smokeless tobacco: Never   Substance and Sexual Activity    Alcohol use: Yes     Comment: holidays  rare    Drug use: No    Sexual activity: Not Currently     Partners: Female     Review of Systems  Objective:     Vital Signs (Most Recent):  Temp: 98 °F (36.7 °C) (02/02/24 1058)  Pulse: 101 (02/02/24 1104)  Resp: 18 (02/02/24 1058)  BP: 103/61 (02/02/24 1058)  SpO2: (!) 94 % (02/02/24 1058) Vital Signs (24h Range):  Temp:  [97.2 °F (36.2 °C)-99 °F (37.2 °C)] 98 °F (36.7 °C)  Pulse:  [] 101  Resp:  [12-20] 18  SpO2:  [93 %-100 %] 94 %  BP: ()/(56-72) 103/61     Weight: 104.6 kg (230 lb 9.6 oz)  Body mass index is 38.97 kg/m².      Intake/Output Summary (Last 24 hours) at 2/2/2024 1403  Last data filed at 2/2/2024 1039  Gross per 24 hour " "  Intake 340 ml   Output 3000 ml   Net -2660 ml         Physical Exam  Gen: NAD  CVS: s1s2. JVD-  Pulm: bibasilar crackles  GI: soft NT  Ext: 1 + edema b/l   Significant Labs:  CBC:  Recent Labs   Lab 01/31/24  0312   WBC 6.49   RBC 6.21*   HGB 15.7   HCT 54.0      MCV 87   MCH 25.3*   MCHC 29.1*       BNP:  No results for input(s): "BNP" in the last 72 hours.    Invalid input(s): "BNPTRIAGELBLO"  CMP:  Recent Labs   Lab 02/02/24  0241      CALCIUM 10.0   ALBUMIN 2.9*   PROT 8.3      K 3.0*   CO2 37*   CL 87*   BUN 74*   CREATININE 1.8*   ALKPHOS 111   ALT 13   AST 22   BILITOT 0.9        Coagulation:   Recent Labs   Lab 02/02/24 0241   INR 2.7*       LDH:  No results for input(s): "LDH" in the last 72 hours.  Microbiology:  Microbiology Results (last 7 days)       ** No results found for the last 168 hours. **             ABGs: No results for input(s): "PH", "PCO2", "HCO3", "POCSATURATED", "BE" in the last 72 hours.  BMP:   Recent Labs   Lab 02/02/24 0241         K 3.0*   CL 87*   CO2 37*   BUN 74*   CREATININE 1.8*   CALCIUM 10.0   MG 1.8       Cardiac Markers: No results for input(s): "CKMB", "TROPONINT", "MYOGLOBIN" in the last 72 hours.  Coagulation:   Recent Labs   Lab 02/02/24  0241   INR 2.7*       I have reviewed all pertinent labs within the past 24 hours.      Assessment/Plan:     #Acute on Chronic Hypoxic Hyercapneic respiratory failure( multifactorial- HfpEF/pulm htn/OHS )- on 3 l oxygen currently( 3l baseline at home prior to admission) improvement from 11l on diuresis.   #AARON on CKD prerenal s/t volume overload.   #Severe Pre capillary Pulmonary Hypertension( Grp 1 and Group 3)  #Severely decreased functional capacity/exercise tolerance s/t PH.  #PFO with unsuccessful closure 2006  #P. Afib on Coumadin for unknown reason. Currently in NSR  #MALLIKA/OHS    RHC 2022 : RA 5 , PCWP 25, PA 58/28, CI 2.8. PVR 2.63.   TTE 1/19/24: severe RV enlargement, Severely reduced RV " function, LVEF >55%, moderate to severe TR, PASP 67 mm hg.   Significant improvement in Hypoxia( 11l ---to 4l) with diuresis.   RHC 1/31/24: RA 14, RV 84/5, PA 80/38( 50), PWP 15, CO 6.14, CI 2.92. PVR 10.     Recommendations:  -pt appears euvolemic on exam today with no JVD. Transitioned to po torsemide 60 BID and tolerating well with good UOP.  ( Of note his home dose- lasix 80 BID prior to admission). DC today with close follow up with [facility] pulmonology.  - Decreased functioning capacity, hypoxic on minimal ambulation with increased Oxygen requirements to 6l , but at rest on 3l NC and comfortable( baseline). RHC with worsening PH( grp 1 and 3) compared to previous report. Spoke to Dr. Juni Red who discussed with [facility] and plan to DC patient with close follow up with [facility] to decide on PAH therapies as outpatient.   - Dc'd metoprolol this admission due to severe PH and fatigue/lethargy/lightheaded on minimal ambulation.   - on Coumadin for afib. Currently in NSR.    -counseled patient on diet and fluid restriction.       Bj Lamb MD  Heart Transplant  [facility] - Cardiology Stepdown

## 2024-02-02 NOTE — PLAN OF CARE
Problem: Adult Inpatient Plan of Care  Goal: Plan of Care Review  Outcome: Ongoing, Progressing     Problem: Adult Inpatient Plan of Care  Goal: Optimal Comfort and Wellbeing  Outcome: Ongoing, Progressing     Problem: Oral Intake Inadequate (Heart Failure)  Goal: Optimal Nutrition Intake  Outcome: Ongoing, Progressing     Problem: Respiratory Compromise (Heart Failure)  Goal: Effective Oxygenation and Ventilation  Outcome: Ongoing, Progressing

## 2024-02-02 NOTE — PLAN OF CARE
CHW met with patient/family at bedside. Patient experience rounding completed and reviewed the following.     Do you know your discharge plan? Yes or No,    If yes, what is the plan? (Home, Home Health, Rehab, SNF, LTAC, or Other)  Yes Home    Have you discussed your needs and preferences with your SW/CM? Yes or No  Yes Home     If you are discharging home, do you have help at home? Yes or No Yes    Do you think you will need help additional at home at discharge? Yes or No  No     Do you currently have difficulty keeping up with bills, affording medicine or buying food? Yes or No No      Assigned SW/CM notified of any patient/family needs or concerns. Appropriate resources provided to address patient's needs.          Cardiologist appointment scheduled 02/02 at 2pm changed to 02/02 at 1:20pm- [facility] doesn't have appointments until March          Brina Lorenzana CHW   Case Management   [phone]

## 2024-02-05 ENCOUNTER — TELEPHONE (OUTPATIENT)
Dept: CARDIOLOGY | Facility: CLINIC | Age: 49
End: 2024-02-05

## 2024-02-05 ENCOUNTER — ANTI-COAG VISIT (OUTPATIENT)
Dept: CARDIOLOGY | Facility: CLINIC | Age: 49
End: 2024-02-05

## 2024-02-05 ENCOUNTER — PATIENT OUTREACH (OUTPATIENT)
Dept: ADMINISTRATIVE | Facility: CLINIC | Age: 49
End: 2024-02-05

## 2024-02-05 DIAGNOSIS — I27.9 CHRONIC PULMONARY HEART DISEASE: Primary | ICD-10-CM

## 2024-02-05 NOTE — PROGRESS NOTES
Patient admitted to hospital 1/18/24-2/2/24. Patient was admitted  with AHRF in setting of decompensated heart failure

## 2024-02-05 NOTE — TELEPHONE ENCOUNTER
"Heart Failure Transitional Care Clinic    Attempted to call pt to complete 24-72hour post discharge "check in" call. Unable to reach pt at listed phone numbers.  Was able to leave message on voicemail [facility]     Will continue to try to reach patient.    "

## 2024-02-05 NOTE — TELEPHONE ENCOUNTER
"Heart Failure Transitional Care Clinic(HFT[facility]) hospital discharge 48-72 hour phone follow up completed.     Most Recent Hospital Discharge Date: February 2, 2024      Last admission Diagnosis/chief complaint:  SOB, BLEE    TCC RN Navigator spoke with: pt; Ms [place]     Current Patient reported weight: 233 lbs     Current Patient reported blood pressure and heart rate: Pt did not remember VS.      Pt reports the following:  []  Shortness of Breath with Activity  []  Shortness of Breath at rest   []  Fatigue  []  Edema   [] Chest pain or tightness  [] Weight Increase since discharge  [x] None of the above    Medications:   Discharge medication reviewed with pt.  Pt reports having medication list available and has all medications at home for use per list.    Pt denies questions or needs r/t medication.   Education:   Confirmed pt received "Home Care Guide for Heart Failure Patients" while admitted.   Reviewed key points as listed below.     Recommend 2 -3 gram sodium restriction and 1500 cc-2000 cc fluid restriction.  Encourage physical activity with graded exercise program.  Requested patient to weigh themselves daily, and to notify us if their weight increases by more than 3 lbs in 1 day or 5 lbs in 3 days.   Reminded patient to use "Daily weight and symptom tracker" to record and guide patient on when and how to call HFTCC. PT may also use symptom tracker if no scale available  Pt reports being in the (color) Zone. If in yellow/red, reminded that they should be calling HFTCC today or now.     Watch for these Signs and Symptoms: If any of these occur, contact HFTCC immediately:   Increase in shortness of breath with movement   Increase in swelling in your legs and ankles   Weight gain of more than 3 pounds in a day or 5 pounds in 3 days.   Difficulty breathing when you are lying down   Worsening fatigue or tiredness   Stomach bloating, a full feeling or a loss of appetite   Increased coughing--especially when you are " lying down      Pt was able to verbalize back to RN in their own words correct diet/fluid restrictions, necessity for exercise, warning signs and symptoms, when and how to contact their TCC team.      Pt educated on follow-up plan while in HFTCC program to include:   Week 1 -  F/u appt with Provider and RN (Date) February 9, 2024 at 2 PM   Week 2-5 - In person/ Virtual/ phone call check ins    Week 5-7 - Pt will discharge from HFTCC and transition to longterm care provider (Cardiology/PCP/ Advanced Heart Failure).      Patient active on myChart?       Pt given the following contact information for ease of communication: [phone] (Mon-Fri, 8a-5p) & for urgent issues on the weekend to page the Heart Transplant MD on call.  Pt also encouraged utilize [facility] messaging as well.      Will follow up with pt at first clinic visit and RN navigator available for pt questions, issues or concerns.

## 2024-02-05 NOTE — PROGRESS NOTES
C3 nurse spoke with Yong Mcintyre for a TCC post hospital discharge follow up call. The patient has a scheduled HOSFU appointment with Gianni Escalona MD on 02/12/2024 @ 1030.

## 2024-02-09 ENCOUNTER — PATIENT MESSAGE (OUTPATIENT)
Dept: CARDIOLOGY | Facility: CLINIC | Age: 49
End: 2024-02-09

## 2024-02-09 ENCOUNTER — ANTI-COAG VISIT (OUTPATIENT)
Dept: CARDIOLOGY | Facility: CLINIC | Age: 49
End: 2024-02-09
Payer: MEDICARE

## 2024-02-09 ENCOUNTER — LAB VISIT (OUTPATIENT)
Dept: LAB | Facility: HOSPITAL | Age: 49
End: 2024-02-09
Payer: MEDICARE

## 2024-02-09 ENCOUNTER — OFFICE VISIT (OUTPATIENT)
Dept: CARDIOLOGY | Facility: CLINIC | Age: 49
End: 2024-02-09
Payer: MEDICARE

## 2024-02-09 VITALS
DIASTOLIC BLOOD PRESSURE: 60 MMHG | SYSTOLIC BLOOD PRESSURE: 99 MMHG | BODY MASS INDEX: 39.06 KG/M2 | WEIGHT: 234.44 LBS | HEART RATE: 110 BPM | HEIGHT: 65 IN | OXYGEN SATURATION: 88 %

## 2024-02-09 DIAGNOSIS — I48.91 HYPERCOAGULABLE STATE DUE TO ATRIAL FIBRILLATION, UNSPECIFIED TYPE: ICD-10-CM

## 2024-02-09 DIAGNOSIS — R06.02 SOB (SHORTNESS OF BREATH): ICD-10-CM

## 2024-02-09 DIAGNOSIS — D68.69 HYPERCOAGULABLE STATE DUE TO ATRIAL FIBRILLATION, UNSPECIFIED TYPE: ICD-10-CM

## 2024-02-09 DIAGNOSIS — E66.2 PICKWICKIAN SYNDROME: Chronic | ICD-10-CM

## 2024-02-09 DIAGNOSIS — I27.9 CHRONIC PULMONARY HEART DISEASE: ICD-10-CM

## 2024-02-09 DIAGNOSIS — I50.812 CHRONIC RIGHT-SIDED HEART FAILURE: Primary | Chronic | ICD-10-CM

## 2024-02-09 DIAGNOSIS — N18.30 STAGE 3 CHRONIC KIDNEY DISEASE, UNSPECIFIED WHETHER STAGE 3A OR 3B CKD: ICD-10-CM

## 2024-02-09 DIAGNOSIS — I27.9 CHRONIC PULMONARY HEART DISEASE: Primary | ICD-10-CM

## 2024-02-09 DIAGNOSIS — Z79.01 LONG TERM CURRENT USE OF ANTICOAGULANT THERAPY: ICD-10-CM

## 2024-02-09 DIAGNOSIS — I27.20 PULMONARY HYPERTENSION: Chronic | ICD-10-CM

## 2024-02-09 DIAGNOSIS — Z99.81 OXYGEN DEPENDENT: ICD-10-CM

## 2024-02-09 LAB
ALBUMIN SERPL BCP-MCNC: 3.1 G/DL (ref 3.5–5.2)
ALP SERPL-CCNC: 132 U/L (ref 55–135)
ALT SERPL W/O P-5'-P-CCNC: 17 U/L (ref 10–44)
ANION GAP SERPL CALC-SCNC: 11 MMOL/L (ref 8–16)
AST SERPL-CCNC: 24 U/L (ref 10–40)
BASOPHILS # BLD AUTO: 0.07 K/UL (ref 0–0.2)
BASOPHILS NFR BLD: 0.9 % (ref 0–1.9)
BILIRUB SERPL-MCNC: 0.8 MG/DL (ref 0.1–1)
BNP SERPL-MCNC: 503 PG/ML (ref 0–99)
BUN SERPL-MCNC: 67 MG/DL (ref 6–20)
CALCIUM SERPL-MCNC: 9.5 MG/DL (ref 8.7–10.5)
CHLORIDE SERPL-SCNC: 93 MMOL/L (ref 95–110)
CO2 SERPL-SCNC: 33 MMOL/L (ref 23–29)
CREAT SERPL-MCNC: 2.3 MG/DL (ref 0.5–1.4)
DIFFERENTIAL METHOD BLD: ABNORMAL
EOSINOPHIL # BLD AUTO: 0.1 K/UL (ref 0–0.5)
EOSINOPHIL NFR BLD: 1 % (ref 0–8)
ERYTHROCYTE [DISTWIDTH] IN BLOOD BY AUTOMATED COUNT: 22.7 % (ref 11.5–14.5)
EST. GFR  (NO RACE VARIABLE): 34.2 ML/MIN/1.73 M^2
GLUCOSE SERPL-MCNC: 118 MG/DL (ref 70–110)
HCT VFR BLD AUTO: 58.4 % (ref 40–54)
HGB BLD-MCNC: 16.8 G/DL (ref 14–18)
IMM GRANULOCYTES # BLD AUTO: 0.02 K/UL (ref 0–0.04)
IMM GRANULOCYTES NFR BLD AUTO: 0.3 % (ref 0–0.5)
INR PPP: 1.9 (ref 0.8–1.2)
LYMPHOCYTES # BLD AUTO: 1.2 K/UL (ref 1–4.8)
LYMPHOCYTES NFR BLD: 15 % (ref 18–48)
MAGNESIUM SERPL-MCNC: 1.3 MG/DL (ref 1.6–2.6)
MCH RBC QN AUTO: 25.9 PG (ref 27–31)
MCHC RBC AUTO-ENTMCNC: 28.8 G/DL (ref 32–36)
MCV RBC AUTO: 90 FL (ref 82–98)
MONOCYTES # BLD AUTO: 0.6 K/UL (ref 0.3–1)
MONOCYTES NFR BLD: 7.4 % (ref 4–15)
NEUTROPHILS # BLD AUTO: 5.9 K/UL (ref 1.8–7.7)
NEUTROPHILS NFR BLD: 75.4 % (ref 38–73)
NRBC BLD-RTO: 0 /100 WBC
PHOSPHATE SERPL-MCNC: 3 MG/DL (ref 2.7–4.5)
PLATELET # BLD AUTO: 296 K/UL (ref 150–450)
PMV BLD AUTO: 10.4 FL (ref 9.2–12.9)
POTASSIUM SERPL-SCNC: 3.6 MMOL/L (ref 3.5–5.1)
PROT SERPL-MCNC: 8.6 G/DL (ref 6–8.4)
PROTHROMBIN TIME: 19.2 SEC (ref 9–12.5)
RBC # BLD AUTO: 6.49 M/UL (ref 4.6–6.2)
SODIUM SERPL-SCNC: 137 MMOL/L (ref 136–145)
WBC # BLD AUTO: 7.79 K/UL (ref 3.9–12.7)

## 2024-02-09 PROCEDURE — 99214 OFFICE O/P EST MOD 30 MIN: CPT | Mod: HCNC,S$GLB,,

## 2024-02-09 PROCEDURE — 3074F SYST BP LT 130 MM HG: CPT | Mod: HCNC,CPTII,S$GLB,

## 2024-02-09 PROCEDURE — 36415 COLL VENOUS BLD VENIPUNCTURE: CPT | Mod: HCNC

## 2024-02-09 PROCEDURE — 3078F DIAST BP <80 MM HG: CPT | Mod: HCNC,CPTII,S$GLB,

## 2024-02-09 PROCEDURE — 83735 ASSAY OF MAGNESIUM: CPT | Mod: HCNC

## 2024-02-09 PROCEDURE — 80053 COMPREHEN METABOLIC PANEL: CPT | Mod: HCNC

## 2024-02-09 PROCEDURE — 3044F HG A1C LEVEL LT 7.0%: CPT | Mod: HCNC,CPTII,S$GLB,

## 2024-02-09 PROCEDURE — 1159F MED LIST DOCD IN RCRD: CPT | Mod: HCNC,CPTII,S$GLB,

## 2024-02-09 PROCEDURE — 83880 ASSAY OF NATRIURETIC PEPTIDE: CPT | Mod: HCNC

## 2024-02-09 PROCEDURE — 99999 PR PBB SHADOW E&M-EST. PATIENT-LVL V: CPT | Mod: PBBFAC,HCNC,,

## 2024-02-09 PROCEDURE — 1160F RVW MEDS BY RX/DR IN RCRD: CPT | Mod: HCNC,CPTII,S$GLB,

## 2024-02-09 PROCEDURE — 93793 ANTICOAG MGMT PT WARFARIN: CPT | Mod: S$GLB,,,

## 2024-02-09 PROCEDURE — 85025 COMPLETE CBC W/AUTO DIFF WBC: CPT | Mod: HCNC

## 2024-02-09 PROCEDURE — 84100 ASSAY OF PHOSPHORUS: CPT | Mod: HCNC

## 2024-02-09 PROCEDURE — 85610 PROTHROMBIN TIME: CPT | Mod: HCNC | Performed by: INTERNAL MEDICINE

## 2024-02-09 PROCEDURE — 3008F BODY MASS INDEX DOCD: CPT | Mod: HCNC,CPTII,S$GLB,

## 2024-02-09 PROCEDURE — 1111F DSCHRG MED/CURRENT MED MERGE: CPT | Mod: HCNC,CPTII,S$GLB,

## 2024-02-09 NOTE — PROGRESS NOTES
HF TCC Provider Note (Initial Clinic) Consult Note    Date of original referral: 1/19/24  Age: 48 y.o.  Gender: male  Ethnicity: Black or   Type of Congestive Heart Failure: Right sided HF  Etiology: cor pulmonale  Enrolled in Infusion suite: no    Diagnostic Labs:   EKG - 01/31/2024  CXR - 01/29/2024  ECHO - 01/19/2024  Stress test -   Stress echo -   Pharmacologic stress -   Cardiac catheterization - 01/31/2024   Cardiac MRI -     Lab Results   Component Value Date     02/02/2024     02/01/2024    K 3.0 (L) 02/02/2024    K 3.9 02/01/2024    CL 87 (L) 02/02/2024    CL 87 (L) 02/01/2024    CO2 37 (H) 02/02/2024    CO2 39 (H) 02/01/2024     02/02/2024    GLU 92 02/01/2024    BUN 74 (H) 02/02/2024    BUN 94 (H) 02/01/2024    CREATININE 1.8 (H) 02/02/2024    CREATININE 2.2 (H) 02/01/2024    CALCIUM 10.0 02/02/2024    CALCIUM 10.2 02/01/2024    PROT 8.3 02/02/2024    PROT 8.8 (H) 02/01/2024    ALBUMIN 2.9 (L) 02/02/2024    ALBUMIN 3.0 (L) 02/01/2024    BILITOT 0.9 02/02/2024    BILITOT 1.2 (H) 02/01/2024    ALKPHOS 111 02/02/2024    ALKPHOS 111 02/01/2024    AST 22 02/02/2024    AST 20 02/01/2024    ALT 13 02/02/2024    ALT 14 02/01/2024    ANIONGAP 12 02/02/2024    ANIONGAP 12 02/01/2024    ESTGFRAFRICA 54 (L) 12/14/2022    ESTGFRAFRICA >60.0 06/13/2022    EGFRNONAA >60.0 06/13/2022    EGFRNONAA 44.2 (A) 06/03/2022       Lab Results   Component Value Date    WBC 6.49 01/31/2024    WBC 7.23 01/28/2024    RBC 6.21 (H) 01/31/2024    RBC 6.56 (H) 01/28/2024    HGB 15.7 01/31/2024    HGB 16.4 01/28/2024    HCT 54.0 01/31/2024    HCT 57.4 (H) 01/28/2024    MCV 87 01/31/2024    MCV 88 01/28/2024    MCH 25.3 (L) 01/31/2024    MCH 25.0 (L) 01/28/2024    MCHC 29.1 (L) 01/31/2024    MCHC 28.6 (L) 01/28/2024    RDW 21.4 (H) 01/31/2024    RDW 21.4 (H) 01/28/2024     01/31/2024     01/28/2024    MPV 11.0 01/31/2024    MPV 10.2 01/28/2024    IMMGR 0.3 01/31/2024    IMMGR 0.1 01/28/2024     IGABS 0.02 01/31/2024    IGABS 0.01 01/28/2024    LYMPH 1.2 01/31/2024    LYMPH 19.0 01/31/2024    MONO 0.6 01/31/2024    MONO 9.6 01/31/2024    EOS 0.2 01/31/2024    EOS 0.2 01/28/2024    BASO 0.06 01/31/2024    BASO 0.07 01/28/2024    NRBC 0 01/31/2024    NRBC 0 01/28/2024    GRAN 4.4 01/31/2024    GRAN 67.1 01/31/2024    EOSINOPHIL 3.1 01/31/2024    EOSINOPHIL 2.6 01/28/2024    BASOPHIL 0.9 01/31/2024    BASOPHIL 1.0 01/28/2024    PLTEST Decreased (A) 09/10/2011    PLTEST Adequate 07/31/2011    ANISO 2+ (A) 09/10/2011    ANISO 1+ (A) 07/31/2011    HYPO sl 06/15/2011    HYPO 1+ (A) 06/13/2011       Lab Results   Component Value Date    BNP 1,073 (H) 01/18/2024     (H) 12/11/2023    MG 1.8 02/02/2024    MG 2.2 02/01/2024    PHOS 4.0 02/02/2024    PHOS 4.3 02/01/2024    TROPONINI 0.018 01/18/2024    TROPONINI 0.009 12/11/2023    HGBA1C 5.8 (H) 01/19/2024    HGBA1C 5.2 03/18/2022    TSH 2.690 10/27/2023    TSH 2.287 03/18/2022    FREET4 1.05 10/27/2023    FREET4 1.15 03/16/2021    L3NUKPT 6.9 10/19/2010       Lab Results   Component Value Date    CHOL 162 03/16/2022    TRIG 120 03/16/2022    HDL 50 03/16/2022    LDLCALC 88.0 03/16/2022    CHOLHDL 30.9 03/16/2022    TOTALCHOLEST 3.2 03/16/2022    NONHDLCHOL 112 03/16/2022    COLORU Yellow 05/03/2023    APPEARANCEUA Clear 05/03/2023    PHUR 6.0 05/03/2023    SPECGRAV 1.010 05/03/2023    PROTEINUA Negative 05/03/2023    GLUCUA Negative 05/03/2023    KETONESU Negative 05/03/2023    BILIRUBINUA Negative 05/03/2023    OCCULTUA Negative 05/03/2023    NITRITE Negative 05/03/2023    LEUKOCYTESUR Negative 05/03/2023       No implanted cardiac devices    Current Outpatient Medications on File Prior to Visit   Medication Sig Dispense Refill    acetaminophen (TYLENOL ARTHRITIS ORAL) Take 2 tablets by mouth daily as needed (pain).      albuterol (VENTOLIN HFA) 90 mcg/actuation inhaler Inhale 2 puffs into the lungs every 4 (four) hours as needed for Wheezing. Rescue 18 g  2    allopurinoL (ZYLOPRIM) 300 MG tablet Take 1 tablet (300 mg total) by mouth once daily. 90 tablet 3    ascorbic acid (VITAMIN C ORAL) Take 1 tablet by mouth once daily.      b complex vitamins tablet Take 1 tablet by mouth once daily.      CALCIUM ORAL Take 1 capsule by mouth once daily.      multivit,calc,min/FA/K1/lycop (ONE-A-DAY MEN'S COMPLETE ORAL) Take 1 tablet by mouth once daily.      omega-3 fatty acids/fish oil (FISH OIL-OMEGA-3 FATTY ACIDS) 300-1,000 mg capsule Take 1 capsule by mouth once daily.      pantoprazole (PROTONIX) 40 MG tablet Take 1 tablet (40 mg total) by mouth once daily. (Patient not taking: Reported on 2/5/2024) 90 tablet 3    polyethylene glycol 400 (VISINE DRY EYE RELIEF) 1 % Drop Place 2 drops into both eyes daily as needed (dry eyes).      potassium chloride (MICRO-K) 10 MEQ CpSR Take 10 mEq by mouth 2 (two) times daily.      tiotropium (SPIRIVA) 18 mcg inhalation capsule Inhale 18 mcg into the lungs once daily.      torsemide (DEMADEX) 20 MG Tab Take 3 tablets (60 mg total) by mouth 2 (two) times a day. 270 tablet 3    warfarin (COUMADIN) 10 MG tablet Take 1/2 tablet (5 mg) by mouth daily or as directed by Coumadin Clinic (Patient taking differently: Take 10 mg by mouth. Take 1 table by mouth every Sunday and 1/2 (5 mg)tablet by mouth on all other days.) 30 tablet 3    WIXELA INHUB 250-50 mcg/dose diskus inhaler 1 puff 2 (two) times daily. USE 1 INHALATION BY MOUTH TWICE DAILY      [DISCONTINUED] sildenafil (REVATIO) 20 mg Tab Take 1 tablet (20 mg total) by mouth 3 (three) times daily. 270 tablet prn     No current facility-administered medications on file prior to visit.         HPI:  Patient estimates can walk 5 minutes or so and pace normal before SOB. Wearing 3L pulse dose O2 to clinic today    Patient sleeps on 1 number of pillows   Patient wakes up SOB, has to get out of bed, associated cough- denies PND symptoms   Palpitations - denies   Dizzy, light-headed, pre-syncope or  syncope- single episode of pre-syncope since discharge with prolonged bending while not wearing oxygen, denies syncope   Since discharge frequency of performing weights, home weight and weight change- 232lbs on hospital discharge. weight now steady on home scale 230-231lbs   Other information felt pertinent to HPI: Mr. Yong Mcintyre is a 49 yo male with a PMHx of HFpEF, pickwickian syndrome, HTN, PH, Chronic hypoxic respiratory failure (baseline oxygen req at home is 3 L), MALLIKA, PFO (unsuccessful closure in 2006), AF (on warfarin for unknown reason) who presents to first HFTCC visit following recent admission for ADHF after presenting to ED with c/o worsening leg swelling and shortness of breath for the past 1 week. He was admitted last month with similar symptoms and was discharged on lasix and metolazone and told to weigh himself daily as he may need dosage adjustment of his diuretics. In ED SpO2 79%. Labs reveal worsened BNP >1000. New AARON with Cr 2.7 (up from baseline of 1.5). Initial VBG with pH 7.32 and CO2 83. Initially put on BIPAP but weaned off to high flow nasal cannula. Patient was admitted to  with AHRF in setting of decompensated heart failure. Patient was initially diuresing well with 120mg TID IVP lasix, but upgraded to lasix drip at 15 mg/hr and daily Metolazone 10mg. HTS following and agree with continued, aggressive diuresis and is tentatively planning for RHC once patient becomes euvolemic. RHC completed 01/31/24. Patient with PH WHO group 3, HTS recommend starting therapies outpatient. Discontinued BB due to his known RV dysfunction. Transitioned to po Torsemide 60mg BID. Renal function back to baseline, patient stable for discharged to home with close follow-up with [facility] outpatient for further decisions regarding PAH therapies.     PHYSICAL:   Vitals:    02/09/24 1410   BP: 99/60   Pulse: 110      @DXVG2COYUTDT(3)@    JVD: yes, 1cm above clavicle sitting   Heart rhythm: regular  Cardiac  murmur: yes   S3: no  S4: no  Lungs:  diminished breath sounds throughout, minimal crackles  Hepatojugular reflux: yes, coupe cm  Edema: trace BLE edema, varicose veins bilaterally    Echo 1/19/24:    Left Ventricle: The left ventricle is normal in size. Normal wall thickness. Normal wall motion. Septal flattening in systole consistent with right ventricular pressure overload. There is normal systolic function. Ejection fraction by visual approximation is 55%. There is normal diastolic function.    Right Ventricle: Severe right ventricular enlargement. Wall thickness is normal. Right ventricle wall motion has global hypokinesis. Systolic function is severely reduced.    Tricuspid Valve: There is moderate to severe regurgitation.    Pulmonary Artery: There is moderate to severe pulmonary hypertension. The estimated pulmonary artery systolic pressure is 67 mmHg.    IVC/SVC: Intermediate venous pressure at 8 mmHg.    Pericardium: There is a trivial effusion.    ASSESSMENT: RV dysfunction    PLAN:      Patient Instructions:   Instruct the patient to notify this clinic if HH, a physician or an advanced care provider wants to change medication one of their HF medications   Activity and Diet restrictions:   Recommend 2-3 gram sodium restriction and 1500cc- 2000cc fluid restriction.  Encourage physical activity with graded exercise program.  Requested patient to weigh themselves daily, and to notify us if their weight increases by more than 3 lbs in 1 day or 5 lbs in 3 days.    Assigned dry weight on home scale: 230-231lbs  Medication changes (include current dose and changed dose): NYHA Class III symptoms. Fairly well compensated on exam. Discharged on torsemide 60mg BID. Continue current regimen for now. Consider addition of weekly scheduled metolazone in future as needed for additional volume management. BB discontinued during admission 2/2 severe RV dysfunction. Has close follow up scheduled with [facility] group next week  to discuss additional PAH therapies.  Upcoming labs and date anticipated: weekly phone check ins with in person visits kenia Sams PA-C

## 2024-02-09 NOTE — PROGRESS NOTES
Spoke to patient regarding discharge medication dosing and home health. Patient confirmed a discharge warfarin dose of 5 mg qPM. Per patient, at this time home health has not been set up, and he is unsure of what company will be following him.

## 2024-02-09 NOTE — PROGRESS NOTES
[facility]    2024 4:49 PM    Assessment/Plan:    Patient presents today with subtherapeutic  INR.    Recommendation for patient's warfarin regimen: Boost dose today to 7.5mg then resume current maintenance dose    Recommend repeat INR in 1 week  _________________________________________________________________    Yong Mcintyre (48 y.o.) is followed by the [facility].    Anticoagulation Summary  As of 2024      INR goal:  2.0-3.0   TTR:  54.1 % (11.1 y)   INR used for dosin.9 (2024)   Warfarin maintenance plan:  5 mg (10 mg x 0.5) every day   Weekly warfarin total:  35 mg   Plan last modified:  Shanae Chinchilla, PharmD (12/15/2023)   Next INR check:  2/15/2024   Target end date:  Indefinite    Indications    Chronic pulmonary heart disease  unspecified [I27.9]                 Anticoagulation Episode Summary       INR check location:  Clinic Lab    Preferred lab:  [facility]    Send INR reminders to:  [facility] COUMADIN MONITORING POOL    Comments:  [facility]//Infusion Suite, every other Mon -- ph [phone]// <VENIPUNCTURE ONLY (POC/meter does not work for pt)>          Anticoagulation Care Providers       Provider Role Specialty Phone number    Gianni Escalona MD [facility] Internal Medicine [phone]

## 2024-02-09 NOTE — PATIENT INSTRUCTIONS
Continue torsemide 60mg twice daily.    Notify us with any worsening shortness of breath, swelling, or 3lb weight gain in 1 day/5lb weight gain in 1 week on home scale.

## 2024-02-11 NOTE — PROGRESS NOTES
MEDICAL HISTORY:  Pulmonary hypertension.  Hypoventilation syndrome associated with chronic respiratory failure of hypercapnia and hypoxemia.  He is on 3 liters O2.  Heart failure preserved ejection fraction  Obstructive sleep apnea with the use of CPAP.  Gout.  Patent foramen ovale.,   Cholecystectomy.     SOCIAL HISTORY:  Tobacco use and alcohol use - none.     MEDICATIONS:   Pantoprazole 40 mg   Torsemide 60 mg b.I.d.  Allopurinol 300 mg  Advair Diskus 250/50 one puff twice a day  Micro-K 10 mEq  Coumadin.  Spiriva 1 puff daily       48-year-old male   Hospital follow-up   Hospital admit January 18th to February 2nd.       Presented with worsening right ventricular function.  And pulmonary hypertension.  He was more short of breath but the main thing was fluid retention and weight gain.  Treated with IV Lasix 125 mg q.8 hours then later insulin drip.  Responded very well for which significant fluid was removed.  Reportedly is weight upon admission was 265 lb.  Today his weight in the office  237 lb but at home is 235 lb today  The lowest weight he got down was 230 lb     He underwent right heart catheterization in 2D echo.  Which showed severe pulmonary hypertension right ventricular dysfunction.      Medications of furosemide metolazone and metoprolol was discontinued.  The beta-blocker was discontinued because of stage III or class 3 pulmonary hypertension.  Torsemide 60 mg b.I.d. was added.      Actually today he conferred with his pulmonologist because of some recent weight gain metolazone 2.5 mg a day was re-initiated     He can tell that he was feeling better.  His breathing is improved.  Occasionally he will notice his heart rate going fast and he can feel a discomfort in his chest.  At the time this is sporadic.  No abdominal pain.      He has a appointment with his pulmonologist in a couple of days     During the hospital stay magnesium was low and was supplemented     Examination   Weight 237  lb  Pulse 108   Blood pressure by me 90/62  Chest clear breath sounds very scant rales at the base  Heart tachycardia  with 1/6 murmur  Abdominal exam soft nontender   1+ pedal edema     Impression   Pulmonary hypertension   Right ventricular dysfunction   Hypoventilation syndrome with chronic combined respiratory failure.  Is on 3 L O2   Chronic kidney disease stage IIIA.  It was noted that prior to admission his creatinine was around 1.5 upon discharge was 2.3    Plan an addendum   Patient is a test results.  Follow-up magnesium was 1.4.  Magnesium oxide 400 mg once a day was recommended.  Will repeat the test in the few days.  Other test results was discussed regarding the creatinine of 2.3 and      He was meeting with the lungs specialist February 14th and will address that with him as for his the BNP as well as the heart rate particularly in regard to the metoprolol being discontinued

## 2024-02-12 ENCOUNTER — LAB VISIT (OUTPATIENT)
Dept: LAB | Facility: HOSPITAL | Age: 49
End: 2024-02-12
Attending: INTERNAL MEDICINE
Payer: MEDICARE

## 2024-02-12 ENCOUNTER — PATIENT MESSAGE (OUTPATIENT)
Dept: INTERNAL MEDICINE | Facility: CLINIC | Age: 49
End: 2024-02-12

## 2024-02-12 ENCOUNTER — TELEPHONE (OUTPATIENT)
Dept: CARDIOLOGY | Facility: CLINIC | Age: 49
End: 2024-02-12

## 2024-02-12 ENCOUNTER — OFFICE VISIT (OUTPATIENT)
Dept: INTERNAL MEDICINE | Facility: CLINIC | Age: 49
End: 2024-02-12
Payer: MEDICARE

## 2024-02-12 VITALS
BODY MASS INDEX: 39.59 KG/M2 | HEIGHT: 65 IN | OXYGEN SATURATION: 86 % | SYSTOLIC BLOOD PRESSURE: 106 MMHG | WEIGHT: 237.63 LBS | HEART RATE: 111 BPM | DIASTOLIC BLOOD PRESSURE: 72 MMHG

## 2024-02-12 DIAGNOSIS — I27.20 PULMONARY HYPERTENSION: ICD-10-CM

## 2024-02-12 DIAGNOSIS — R06.89 HYPOVENTILATION SYNDROME: ICD-10-CM

## 2024-02-12 DIAGNOSIS — I51.9 RIGHT VENTRICULAR DYSFUNCTION: ICD-10-CM

## 2024-02-12 DIAGNOSIS — I27.20 PULMONARY HYPERTENSION: Primary | ICD-10-CM

## 2024-02-12 DIAGNOSIS — J96.11 CHRONIC RESPIRATORY FAILURE WITH HYPOXIA AND HYPERCAPNIA: ICD-10-CM

## 2024-02-12 DIAGNOSIS — Z79.899 OTHER LONG TERM (CURRENT) DRUG THERAPY: ICD-10-CM

## 2024-02-12 DIAGNOSIS — R79.0 LOW MAGNESIUM LEVEL: Primary | ICD-10-CM

## 2024-02-12 DIAGNOSIS — N18.30 STAGE 3 CHRONIC KIDNEY DISEASE, UNSPECIFIED WHETHER STAGE 3A OR 3B CKD: ICD-10-CM

## 2024-02-12 DIAGNOSIS — R79.0 LOW MAGNESIUM LEVEL: ICD-10-CM

## 2024-02-12 DIAGNOSIS — J96.12 CHRONIC RESPIRATORY FAILURE WITH HYPOXIA AND HYPERCAPNIA: ICD-10-CM

## 2024-02-12 LAB
ANION GAP SERPL CALC-SCNC: 14 MMOL/L (ref 8–16)
BASOPHILS # BLD AUTO: 0.06 K/UL (ref 0–0.2)
BASOPHILS NFR BLD: 0.7 % (ref 0–1.9)
BNP SERPL-MCNC: 730 PG/ML (ref 0–99)
BUN SERPL-MCNC: 63 MG/DL (ref 6–20)
CALCIUM SERPL-MCNC: 10 MG/DL (ref 8.7–10.5)
CHLORIDE SERPL-SCNC: 94 MMOL/L (ref 95–110)
CO2 SERPL-SCNC: 31 MMOL/L (ref 23–29)
CREAT SERPL-MCNC: 2.3 MG/DL (ref 0.5–1.4)
DIFFERENTIAL METHOD BLD: ABNORMAL
EOSINOPHIL # BLD AUTO: 0.1 K/UL (ref 0–0.5)
EOSINOPHIL NFR BLD: 1.1 % (ref 0–8)
ERYTHROCYTE [DISTWIDTH] IN BLOOD BY AUTOMATED COUNT: 23.6 % (ref 11.5–14.5)
EST. GFR  (NO RACE VARIABLE): 34.2 ML/MIN/1.73 M^2
GLUCOSE SERPL-MCNC: 109 MG/DL (ref 70–110)
HCT VFR BLD AUTO: 61.1 % (ref 40–54)
HGB BLD-MCNC: 17.2 G/DL (ref 14–18)
IMM GRANULOCYTES # BLD AUTO: 0.03 K/UL (ref 0–0.04)
IMM GRANULOCYTES NFR BLD AUTO: 0.4 % (ref 0–0.5)
LYMPHOCYTES # BLD AUTO: 1 K/UL (ref 1–4.8)
LYMPHOCYTES NFR BLD: 12.7 % (ref 18–48)
MAGNESIUM SERPL-MCNC: 1.4 MG/DL (ref 1.6–2.6)
MCH RBC QN AUTO: 25.9 PG (ref 27–31)
MCHC RBC AUTO-ENTMCNC: 28.2 G/DL (ref 32–36)
MCV RBC AUTO: 92 FL (ref 82–98)
MONOCYTES # BLD AUTO: 0.6 K/UL (ref 0.3–1)
MONOCYTES NFR BLD: 7 % (ref 4–15)
NEUTROPHILS # BLD AUTO: 6.4 K/UL (ref 1.8–7.7)
NEUTROPHILS NFR BLD: 78.1 % (ref 38–73)
NRBC BLD-RTO: 0 /100 WBC
PLATELET # BLD AUTO: 262 K/UL (ref 150–450)
PMV BLD AUTO: 11.2 FL (ref 9.2–12.9)
POTASSIUM SERPL-SCNC: 4.3 MMOL/L (ref 3.5–5.1)
RBC # BLD AUTO: 6.65 M/UL (ref 4.6–6.2)
SODIUM SERPL-SCNC: 139 MMOL/L (ref 136–145)
VIT B12 SERPL-MCNC: 608 PG/ML (ref 210–950)
WBC # BLD AUTO: 8.16 K/UL (ref 3.9–12.7)

## 2024-02-12 PROCEDURE — 83880 ASSAY OF NATRIURETIC PEPTIDE: CPT | Mod: HCNC | Performed by: INTERNAL MEDICINE

## 2024-02-12 PROCEDURE — 82607 VITAMIN B-12: CPT | Mod: HCNC | Performed by: INTERNAL MEDICINE

## 2024-02-12 PROCEDURE — 80048 BASIC METABOLIC PNL TOTAL CA: CPT | Mod: HCNC | Performed by: INTERNAL MEDICINE

## 2024-02-12 PROCEDURE — 1159F MED LIST DOCD IN RCRD: CPT | Mod: HCNC,CPTII,S$GLB, | Performed by: INTERNAL MEDICINE

## 2024-02-12 PROCEDURE — 3008F BODY MASS INDEX DOCD: CPT | Mod: HCNC,CPTII,S$GLB, | Performed by: INTERNAL MEDICINE

## 2024-02-12 PROCEDURE — 99214 OFFICE O/P EST MOD 30 MIN: CPT | Mod: HCNC,S$GLB,, | Performed by: INTERNAL MEDICINE

## 2024-02-12 PROCEDURE — 1160F RVW MEDS BY RX/DR IN RCRD: CPT | Mod: HCNC,CPTII,S$GLB, | Performed by: INTERNAL MEDICINE

## 2024-02-12 PROCEDURE — 1111F DSCHRG MED/CURRENT MED MERGE: CPT | Mod: HCNC,CPTII,S$GLB, | Performed by: INTERNAL MEDICINE

## 2024-02-12 PROCEDURE — 85025 COMPLETE CBC W/AUTO DIFF WBC: CPT | Mod: HCNC | Performed by: INTERNAL MEDICINE

## 2024-02-12 PROCEDURE — 3074F SYST BP LT 130 MM HG: CPT | Mod: HCNC,CPTII,S$GLB, | Performed by: INTERNAL MEDICINE

## 2024-02-12 PROCEDURE — 3078F DIAST BP <80 MM HG: CPT | Mod: HCNC,CPTII,S$GLB, | Performed by: INTERNAL MEDICINE

## 2024-02-12 PROCEDURE — 83735 ASSAY OF MAGNESIUM: CPT | Mod: HCNC | Performed by: INTERNAL MEDICINE

## 2024-02-12 PROCEDURE — 99999 PR PBB SHADOW E&M-EST. PATIENT-LVL IV: CPT | Mod: PBBFAC,HCNC,, | Performed by: INTERNAL MEDICINE

## 2024-02-12 PROCEDURE — 3044F HG A1C LEVEL LT 7.0%: CPT | Mod: HCNC,CPTII,S$GLB, | Performed by: INTERNAL MEDICINE

## 2024-02-12 PROCEDURE — 36415 COLL VENOUS BLD VENIPUNCTURE: CPT | Mod: HCNC | Performed by: INTERNAL MEDICINE

## 2024-02-12 NOTE — TELEPHONE ENCOUNTER
Call from PT re his weight going up since he was last in the Clinic. Wts as follows;  2-9  231 lbs,  2-10 233 lbs,  2-42646.2 lbs,  2-12 235 lbs. PT states He feels fine , no swelling, breathing nml, appetite nml, sleeping OK and his energy is nml. PT states that he is concerned that changing him from Furosemide to Torsemide is not going to work. I reminded PT of his Fluid and Salt restriction and he says he is following both.  We can let him know torsemide is stronger than lasix. He sees his PH specialist on Wednesday. I can always send in some metolazone as needed but we would need repeat labs when he takes doses.   He has some leftover should he take it? Coumadin Clinic wants he to come in Thursday so he could get Labs then.      As long as he has extra potassium with it.     He takes 4 a day 2 Tabs in AM and 2 in PM , they are 10 meq, so how much extra do you want him to take. And how much Metolazone do you want him to take.   5mg metolazone, he can take an extra 40meq K this afternoon .    So 1 dose of 5 mg Metolazone today with his PM Torsemide and an extra 40 meq of K. Got it   Then will have to have cathleen add our labs to his draw this week .    Cathleen can you add labs to Mr. Mcintyre' Coumadin draw on Thursday of this week please?

## 2024-02-12 NOTE — PROGRESS NOTES
Pt says he has not heard from home health yet. INR booked at lab for 2/15. He confirmed he has beent aking warfarin as advised upon discharge

## 2024-02-15 ENCOUNTER — LAB VISIT (OUTPATIENT)
Dept: LAB | Facility: HOSPITAL | Age: 49
End: 2024-02-15
Attending: INTERNAL MEDICINE
Payer: MEDICARE

## 2024-02-15 DIAGNOSIS — I27.9 CHRONIC PULMONARY HEART DISEASE: ICD-10-CM

## 2024-02-15 DIAGNOSIS — R06.02 SOB (SHORTNESS OF BREATH): ICD-10-CM

## 2024-02-15 DIAGNOSIS — Z79.01 LONG TERM CURRENT USE OF ANTICOAGULANT THERAPY: ICD-10-CM

## 2024-02-15 LAB
ALBUMIN SERPL BCP-MCNC: 3.2 G/DL (ref 3.5–5.2)
ALP SERPL-CCNC: 126 U/L (ref 55–135)
ALT SERPL W/O P-5'-P-CCNC: 20 U/L (ref 10–44)
ANION GAP SERPL CALC-SCNC: 12 MMOL/L (ref 8–16)
AST SERPL-CCNC: 21 U/L (ref 10–40)
BILIRUB SERPL-MCNC: 0.8 MG/DL (ref 0.1–1)
BNP SERPL-MCNC: 736 PG/ML (ref 0–99)
BUN SERPL-MCNC: 62 MG/DL (ref 6–20)
CALCIUM SERPL-MCNC: 9.3 MG/DL (ref 8.7–10.5)
CHLORIDE SERPL-SCNC: 92 MMOL/L (ref 95–110)
CO2 SERPL-SCNC: 37 MMOL/L (ref 23–29)
CREAT SERPL-MCNC: 2.5 MG/DL (ref 0.5–1.4)
EST. GFR  (NO RACE VARIABLE): 30.9 ML/MIN/1.73 M^2
GLUCOSE SERPL-MCNC: 105 MG/DL (ref 70–110)
INR PPP: 3 (ref 0.8–1.2)
POTASSIUM SERPL-SCNC: 4.2 MMOL/L (ref 3.5–5.1)
PROT SERPL-MCNC: 8.3 G/DL (ref 6–8.4)
PROTHROMBIN TIME: 30.5 SEC (ref 9–12.5)
SODIUM SERPL-SCNC: 141 MMOL/L (ref 136–145)

## 2024-02-15 PROCEDURE — 83880 ASSAY OF NATRIURETIC PEPTIDE: CPT | Mod: HCNC

## 2024-02-15 PROCEDURE — 80053 COMPREHEN METABOLIC PANEL: CPT | Mod: HCNC

## 2024-02-15 PROCEDURE — 36415 COLL VENOUS BLD VENIPUNCTURE: CPT | Mod: HCNC | Performed by: INTERNAL MEDICINE

## 2024-02-15 PROCEDURE — 85610 PROTHROMBIN TIME: CPT | Mod: HCNC | Performed by: INTERNAL MEDICINE

## 2024-02-16 ENCOUNTER — TELEPHONE (OUTPATIENT)
Dept: CARDIOLOGY | Facility: CLINIC | Age: 49
End: 2024-02-16

## 2024-02-16 ENCOUNTER — ANTI-COAG VISIT (OUTPATIENT)
Dept: CARDIOLOGY | Facility: CLINIC | Age: 49
End: 2024-02-16
Payer: MEDICARE

## 2024-02-16 DIAGNOSIS — I27.9 CHRONIC PULMONARY HEART DISEASE: Primary | ICD-10-CM

## 2024-02-16 PROCEDURE — 93793 ANTICOAG MGMT PT WARFARIN: CPT | Mod: S$GLB,,, | Performed by: PHARMACIST

## 2024-02-16 NOTE — TELEPHONE ENCOUNTER
"[facility](HFT[facility]) weekly phone follow up / triage call completed.     TCC RN Navigator spoke with PT    Current Patient reported weight: 234.8 lbs   Patient Goal Weight: (230-231 lbs)  Recent Patient reported blood pressure and heart rate:  BP-107/65  P-105    Pt reports the following:  []  Shortness of Breath with Activity States the more he exercises the better he feels  []  Shortness of Breath at rest   []  Fatigue  []  Edema wearing compression hose  [] Chest pain or tightness  [] Weight Increase since discharge  [x] None of the above    Pt reports using "Daily weight and symptom tracker".    Pt reports being in the GREEN(color) Zone. If in yellow/red, reminded that they should be calling HFTCC today or now.     Medications:   Medication compliance reviewed with pt.  Pt reports having medication list available and has all medications at home for use per list.   PT has lots of Metolazone and enough Torsemide  Education:   Confirmed pt still has "Heart [facility] Care Clinic Home Care Guide"  . YES    Reminded of key points as listed below.     Recommend 2 -3 gram sodium restriction and 1500 cc-2000 cc fluid restriction.  Encourage physical activity with graded exercise program.  Requested patient to weigh themselves daily, and to notify us if their weight increases by more than 3 lbs in 1 day or 5 lbs in 3 days.   Reminded to use "Daily weight and symptom tracker".  Even if pt does not have a scale, to use symptom tracker.       Watch for these Signs and Symptoms: If any of these occur, contact HFTCC immediately:   Increase in shortness of breath with movement   Increase in swelling in your legs and ankles   Weight gain of more than 3 pounds in a day or 5 pounds in 3 days.   Difficulty breathing when you are lying down   Worsening fatigue or tiredness   Stomach bloating, a full feeling or a loss of appetite   Increased coughing--especially when you are lying down      Pt " was able to verbalize back to RN in their own words correct diet/fluid restrictions, necessity for exercise, warning signs and symptoms, when and how to contact their HFTCC team.      Pt reminded of upcoming appointment for weekly call 2-23-24      Pt reminded of how and when to contact [facility]:  [phone] (Mon-Fri, 8a-5p) & for urgent issues on the weekend to page the Heart Transplant MD on call.  Pt also encouraged utilize [facility] messaging as well.      Pt  verbalized understanding and in agreement of plan.   PT admits to drinking a little over his limit yesterday but states he feels fine and doesn't feel he needs any extra diuretic. Reminded him of Dry Wt of 230-231 lbs and that he is right at the edge so he needs to watch fluids carefully. Encouraged him to call us PRN.      Will follow up with pt at next clinic visit and RN navigator available for pt questions, issues or concerns.

## 2024-02-26 ENCOUNTER — TELEPHONE (OUTPATIENT)
Dept: CARDIOLOGY | Facility: CLINIC | Age: 49
End: 2024-02-26

## 2024-02-26 NOTE — TELEPHONE ENCOUNTER
"[facility] Failure Transitional Care Clinic(HFTCC) weekly phone follow up / triage call completed.     TCC RN Navigator spoke with pt, Mr. Beach    Current Patient reported weight: 236.2 lbs    Patient Goal Weight: (230-231 lbs)  Recent Patient reported blood pressure and heart rate: BP: 90/51     Pt reports the following:  []  Shortness of Breath with Activity  []  Shortness of Breath at rest   []  Fatigue  []  Edema   [] Chest pain or tightness  [x] Weight Increase since discharge  [] None of the above    Pt reports using "Daily weight and symptom tracker".    Pt reports being in the YELLOW Zone. If in yellow/red, reminded that they should be calling HFTCC today or now.     Medications:   Medication compliance reviewed with pt.  Pt reports having medication list available and has all medications at home for use per list.   Pt inquired about metolazone and how to take it, RN informed pt there was no mention of metolazone from ERIN Sams at last mention.  Pt reports 5 lbs weight gain and inability to remember instructions for PRN diuretic. BELÉN reviewed recent [facility] PH note and RN obtained contact info for PH doctor. RN left message for callback to clarify instructions for metolazone.     2/27/2024@1038:  RN contacted pt to f/u with above message.  Per pt he has not received a call back from [facility] PH provider at time of call. Pt has contact information for specialist and will f/u as well. RN placed call to PH provider again, waiting for call back.     2/27/2024@1104: Spoke with Ina PRESTON for [facility] PH provider.  After review of note LPN will need additional time to review with provider and clarify medications.     2/27/2024@ 1203: Ina PRESTON spoke with pt and reports reeducation r/t metolazone and diuretic medications.    Education:   Confirmed pt still has "Heart Failure [facility]itional Care Clinic Home Care Guide"  .     Reminded of key points as listed below.     Recommend 2 -3 gram sodium restriction and 1500 " "cc-2000 cc fluid restriction.  Encourage physical activity with graded exercise program.  Requested patient to weigh themselves daily, and to notify us if their weight increases by more than 3 lbs in 1 day or 5 lbs in 3 days.   Reminded to use "Daily weight and symptom tracker".  Even if pt does not have a scale, to use symptom tracker.       Watch for these Signs and Symptoms: If any of these occur, contact HFTC immediately:   Increase in shortness of breath with movement   Increase in swelling in your legs and ankles   Weight gain of more than 3 pounds in a day or 5 pounds in 3 days.   Difficulty breathing when you are lying down   Worsening fatigue or tiredness   Stomach bloating, a full feeling or a loss of appetite   Increased coughing--especially when you are lying down      Pt was able to verbalize back to RN in their own words correct diet/fluid restrictions, necessity for exercise, warning signs and symptoms, when and how to contact their HFTCC team.      Pt reminded of upcoming appointment.  PT reports they will attend. TCC will f/u with pt by phone with PRN visits. RN awaiting callback from pt PH provider.       Pt reminded of how and when to contact [facility]:  [phone] (Mon-Fri, 8a-5p) & for urgent issues on the weekend to page the Heart Transplant MD on call.  Pt also encouraged utilize [facility] messaging as well.      Pt  verbalized understanding and in agreement of plan.       Will follow up with pt at next clinic visit and RN navigator available for pt questions, issues or concerns.   "

## 2024-03-05 ENCOUNTER — TELEPHONE (OUTPATIENT)
Dept: CARDIOLOGY | Facility: CLINIC | Age: 49
End: 2024-03-05
Payer: MEDICARE

## 2024-03-05 NOTE — TELEPHONE ENCOUNTER
"[facility]) DISCHARGE VISIT - PHONE     Called and spoke to pt, Mr Beach    Most Recent Hospital Discharge Date:  February 2, 2024    Last admission Diagnosis/chief complaint: SOB/ BLEE    Pt discharge completed by phone related topt followed by outside [place] [facility] [place] Pt to contact their office for medication adjustments.     Dis enrollment weight:  240 lbs / 60s     Pt reports the following:  []  Shortness of Breath with activity  []  Shortness of Breath at rest   []  Fatigue  [x]  Edema "little around ankles  [] Chest pain or tightness  [x] Weight Increase since discharge:  10 lb increase from dry weight  [] None of the above    Medications:   Medication reconciliation completed today per RN.  Pt reports having all medications available and understands how to take them appropriately. Reminded pt to call prior to making any changes to medications.    Pt to take Metolazone MWF, pt in red zone. RN waiting ERIN recommendations.    @1310:  ERIN recommends dis enrolling from [facility] and f/u with outside PH provider.     Education:   [x] Confirmed pt still has  "[facility] Home Care Guide" .   Reviewed key points as listed below.     Recommend 2 gram sodium restriction and 1500cc fluid restriction.  Encourage physical activity with graded exercise program.  Requested patient to weigh themselves daily, and to notify us if their weight increases by more than 3 lbs in 1 day or 5 lbs in 3 days.     [x] Reviewed completed "Daily Weight and Symptom Tracker".  Reviewed with patient when and how to call HFT[facility] according to "Yellow Zone" and "Red Zone".       Watch for these Signs and Symptoms: If any of these occur, contact H[facility] immediately:   Increase in shortness of breath with movement   Increase in swelling in your legs and ankles   Weight gain of more than 3 pounds in a night or 5 pounds in 3 days.   Difficulty breathing when you are lying down   Worsening " fatigue or tiredness   Stomach bloating, a full feeling or a loss of appetite   Increased coughing--especially when you are lying down    MyChart and Care Companion:   Patient active on myChart? Yes, patient uses regularly.    HF TCC Program Plan:  Pt has successfully completed HFT[facility] program.  Pt care to be transferred to  [facility]  for long term care.     Pt educated on how to call their offices and how to call [facility] On call in the event of an after hour issue.    PT reminded to continue to follow recommendations made during the HFT[facility] program to include monitoring daily weights, taking medications according to list, following up to appointments per provider recommendations, stop smoking/ start exercising and following a heart friendly low salt, low fluid diet.      Pt was able to verbalize back to RN in their own words correct diet/fluid restrictions, necessity for exercise, warning signs and symptoms, when and how to contact their  Long term care team .      Plan:     Pt seen with Dr. Child on 2/14 2024.  Pt care to be transferred to [facility].    [x]  Discussed upcoming appointments and/or plan for follow-up care with his [facility] provider.      Please refer to provider note for additional details and assessment.

## 2024-03-15 ENCOUNTER — HOSPITAL ENCOUNTER (INPATIENT)
Facility: HOSPITAL | Age: 49
LOS: 6 days | Discharge: HOME HEALTH | DRG: 291 | End: 2024-03-21
Attending: STUDENT IN AN ORGANIZED HEALTH CARE EDUCATION/TRAINING PROGRAM | Admitting: STUDENT IN AN ORGANIZED HEALTH CARE EDUCATION/TRAINING PROGRAM
Payer: MEDICARE

## 2024-03-15 DIAGNOSIS — I50.812 CHRONIC RIGHT-SIDED HEART FAILURE: Chronic | ICD-10-CM

## 2024-03-15 DIAGNOSIS — N17.9 ACUTE KIDNEY INJURY SUPERIMPOSED ON CKD: ICD-10-CM

## 2024-03-15 DIAGNOSIS — R07.9 CHEST PAIN: ICD-10-CM

## 2024-03-15 DIAGNOSIS — J96.12 CHRONIC RESPIRATORY FAILURE WITH HYPOXIA AND HYPERCAPNIA: Primary | ICD-10-CM

## 2024-03-15 DIAGNOSIS — R06.02 SHORTNESS OF BREATH: ICD-10-CM

## 2024-03-15 DIAGNOSIS — N18.9 ACUTE KIDNEY INJURY SUPERIMPOSED ON CKD: ICD-10-CM

## 2024-03-15 DIAGNOSIS — I50.9 CHF (CONGESTIVE HEART FAILURE): ICD-10-CM

## 2024-03-15 DIAGNOSIS — R06.00 DYSPNEA: ICD-10-CM

## 2024-03-15 DIAGNOSIS — J96.11 CHRONIC RESPIRATORY FAILURE WITH HYPOXIA AND HYPERCAPNIA: Primary | ICD-10-CM

## 2024-03-15 PROBLEM — I48.91 ATRIAL FIBRILLATION: Status: ACTIVE | Noted: 2024-03-15

## 2024-03-15 PROBLEM — K21.9 GERD (GASTROESOPHAGEAL REFLUX DISEASE): Status: ACTIVE | Noted: 2024-03-15

## 2024-03-15 PROBLEM — M10.9 GOUT: Status: ACTIVE | Noted: 2024-03-15

## 2024-03-15 LAB
ALBUMIN SERPL BCP-MCNC: 3.1 G/DL (ref 3.5–5.2)
ALLENS TEST: ABNORMAL
ALP SERPL-CCNC: 138 U/L (ref 55–135)
ALT SERPL W/O P-5'-P-CCNC: 16 U/L (ref 10–44)
ANION GAP SERPL CALC-SCNC: 10 MMOL/L (ref 8–16)
ANION GAP SERPL CALC-SCNC: 14 MMOL/L (ref 8–16)
AST SERPL-CCNC: 28 U/L (ref 10–40)
BASOPHILS # BLD AUTO: 0.07 K/UL (ref 0–0.2)
BASOPHILS NFR BLD: 0.8 % (ref 0–1.9)
BILIRUB SERPL-MCNC: 1.4 MG/DL (ref 0.1–1)
BNP SERPL-MCNC: 889 PG/ML (ref 0–99)
BUN SERPL-MCNC: 80 MG/DL (ref 6–30)
BUN SERPL-MCNC: 86 MG/DL (ref 6–20)
BUN SERPL-MCNC: 91 MG/DL (ref 6–20)
CALCIUM SERPL-MCNC: 9.9 MG/DL (ref 8.7–10.5)
CALCIUM SERPL-MCNC: 9.9 MG/DL (ref 8.7–10.5)
CHLORIDE SERPL-SCNC: 81 MMOL/L (ref 95–110)
CHLORIDE SERPL-SCNC: 83 MMOL/L (ref 95–110)
CHLORIDE SERPL-SCNC: 84 MMOL/L (ref 95–110)
CO2 SERPL-SCNC: 36 MMOL/L (ref 23–29)
CO2 SERPL-SCNC: 45 MMOL/L (ref 23–29)
CREAT SERPL-MCNC: 2 MG/DL (ref 0.5–1.4)
CREAT SERPL-MCNC: 2.2 MG/DL (ref 0.5–1.4)
CREAT SERPL-MCNC: 2.4 MG/DL (ref 0.5–1.4)
DIFFERENTIAL METHOD BLD: ABNORMAL
EOSINOPHIL # BLD AUTO: 0.3 K/UL (ref 0–0.5)
EOSINOPHIL NFR BLD: 3.2 % (ref 0–8)
ERYTHROCYTE [DISTWIDTH] IN BLOOD BY AUTOMATED COUNT: 21.2 % (ref 11.5–14.5)
EST. GFR  (NO RACE VARIABLE): 32.5 ML/MIN/1.73 M^2
EST. GFR  (NO RACE VARIABLE): 40.4 ML/MIN/1.73 M^2
GLUCOSE SERPL-MCNC: 107 MG/DL (ref 70–110)
GLUCOSE SERPL-MCNC: 111 MG/DL (ref 70–110)
GLUCOSE SERPL-MCNC: 114 MG/DL (ref 70–110)
HCO3 UR-SCNC: 48.1 MMOL/L (ref 24–28)
HCT VFR BLD AUTO: 55.9 % (ref 40–54)
HCT VFR BLD CALC: 58 %PCV (ref 36–54)
HGB BLD-MCNC: 16 G/DL (ref 14–18)
IMM GRANULOCYTES # BLD AUTO: 0.03 K/UL (ref 0–0.04)
IMM GRANULOCYTES NFR BLD AUTO: 0.3 % (ref 0–0.5)
INR PPP: 2.3 (ref 0.8–1.2)
LYMPHOCYTES # BLD AUTO: 1.1 K/UL (ref 1–4.8)
LYMPHOCYTES NFR BLD: 12 % (ref 18–48)
MAGNESIUM SERPL-MCNC: 1.5 MG/DL (ref 1.6–2.6)
MAGNESIUM SERPL-MCNC: 2 MG/DL (ref 1.6–2.6)
MCH RBC QN AUTO: 25.8 PG (ref 27–31)
MCHC RBC AUTO-ENTMCNC: 28.6 G/DL (ref 32–36)
MCV RBC AUTO: 90 FL (ref 82–98)
MONOCYTES # BLD AUTO: 0.7 K/UL (ref 0.3–1)
MONOCYTES NFR BLD: 8.1 % (ref 4–15)
NEUTROPHILS # BLD AUTO: 6.7 K/UL (ref 1.8–7.7)
NEUTROPHILS NFR BLD: 75.6 % (ref 38–73)
NRBC BLD-RTO: 0 /100 WBC
OHS QRS DURATION: 108 MS
OHS QRS DURATION: 110 MS
OHS QTC CALCULATION: 491 MS
OHS QTC CALCULATION: 660 MS
PCO2 BLDA: 75.7 MMHG (ref 35–45)
PH SMN: 7.41 [PH] (ref 7.35–7.45)
PLATELET # BLD AUTO: 234 K/UL (ref 150–450)
PMV BLD AUTO: 10.2 FL (ref 9.2–12.9)
PO2 BLDA: 29 MMHG (ref 40–60)
POC BE: 23 MMOL/L
POC IONIZED CALCIUM: 1.06 MMOL/L (ref 1.06–1.42)
POC SATURATED O2: 50 % (ref 95–100)
POC TCO2 (MEASURED): 47 MMOL/L (ref 23–27)
POC TCO2: >50 MMOL/L (ref 24–29)
POTASSIUM BLD-SCNC: 2.7 MMOL/L (ref 3.5–5.1)
POTASSIUM SERPL-SCNC: 3.1 MMOL/L (ref 3.5–5.1)
POTASSIUM SERPL-SCNC: 3.5 MMOL/L (ref 3.5–5.1)
PROT SERPL-MCNC: 8.3 G/DL (ref 6–8.4)
PROTHROMBIN TIME: 23.5 SEC (ref 9–12.5)
RBC # BLD AUTO: 6.21 M/UL (ref 4.6–6.2)
SAMPLE: ABNORMAL
SAMPLE: ABNORMAL
SITE: ABNORMAL
SODIUM BLD-SCNC: 134 MMOL/L (ref 136–145)
SODIUM SERPL-SCNC: 134 MMOL/L (ref 136–145)
SODIUM SERPL-SCNC: 138 MMOL/L (ref 136–145)
TROPONIN I SERPL DL<=0.01 NG/ML-MCNC: 0.04 NG/ML (ref 0–0.03)
TROPONIN I SERPL DL<=0.01 NG/ML-MCNC: 0.04 NG/ML (ref 0–0.03)
WBC # BLD AUTO: 8.85 K/UL (ref 3.9–12.7)

## 2024-03-15 PROCEDURE — 5A09457 ASSISTANCE WITH RESPIRATORY VENTILATION, 24-96 CONSECUTIVE HOURS, CONTINUOUS POSITIVE AIRWAY PRESSURE: ICD-10-PCS | Performed by: FAMILY MEDICINE

## 2024-03-15 PROCEDURE — 84484 ASSAY OF TROPONIN QUANT: CPT | Mod: HCNC | Performed by: NURSE PRACTITIONER

## 2024-03-15 PROCEDURE — 99900035 HC TECH TIME PER 15 MIN (STAT): Mod: HCNC

## 2024-03-15 PROCEDURE — 20600001 HC STEP DOWN PRIVATE ROOM: Mod: HCNC

## 2024-03-15 PROCEDURE — 93005 ELECTROCARDIOGRAM TRACING: CPT | Mod: HCNC

## 2024-03-15 PROCEDURE — 25000003 PHARM REV CODE 250: Mod: HCNC | Performed by: STUDENT IN AN ORGANIZED HEALTH CARE EDUCATION/TRAINING PROGRAM

## 2024-03-15 PROCEDURE — 80053 COMPREHEN METABOLIC PANEL: CPT | Mod: HCNC | Performed by: NURSE PRACTITIONER

## 2024-03-15 PROCEDURE — 93010 ELECTROCARDIOGRAM REPORT: CPT | Mod: HCNC,,, | Performed by: INTERNAL MEDICINE

## 2024-03-15 PROCEDURE — 83880 ASSAY OF NATRIURETIC PEPTIDE: CPT | Mod: HCNC | Performed by: NURSE PRACTITIONER

## 2024-03-15 PROCEDURE — 36415 COLL VENOUS BLD VENIPUNCTURE: CPT | Mod: HCNC,XB

## 2024-03-15 PROCEDURE — 84484 ASSAY OF TROPONIN QUANT: CPT | Mod: 91,HCNC | Performed by: STUDENT IN AN ORGANIZED HEALTH CARE EDUCATION/TRAINING PROGRAM

## 2024-03-15 PROCEDURE — 63600175 PHARM REV CODE 636 W HCPCS: Mod: HCNC

## 2024-03-15 PROCEDURE — 63600175 PHARM REV CODE 636 W HCPCS: Mod: HCNC | Performed by: STUDENT IN AN ORGANIZED HEALTH CARE EDUCATION/TRAINING PROGRAM

## 2024-03-15 PROCEDURE — 94660 CPAP INITIATION&MGMT: CPT | Mod: HCNC

## 2024-03-15 PROCEDURE — 80048 BASIC METABOLIC PNL TOTAL CA: CPT | Mod: HCNC,XB

## 2024-03-15 PROCEDURE — 82803 BLOOD GASES ANY COMBINATION: CPT | Mod: HCNC

## 2024-03-15 PROCEDURE — 99285 EMERGENCY DEPT VISIT HI MDM: CPT | Mod: 25

## 2024-03-15 PROCEDURE — 27000190 HC CPAP FULL FACE MASK W/VALVE: Mod: HCNC

## 2024-03-15 PROCEDURE — 80047 BASIC METABLC PNL IONIZED CA: CPT

## 2024-03-15 PROCEDURE — 83735 ASSAY OF MAGNESIUM: CPT | Mod: 91,HCNC

## 2024-03-15 PROCEDURE — 96374 THER/PROPH/DIAG INJ IV PUSH: CPT

## 2024-03-15 PROCEDURE — 27100171 HC OXYGEN HIGH FLOW UP TO 24 HOURS: Mod: HCNC

## 2024-03-15 PROCEDURE — 94761 N-INVAS EAR/PLS OXIMETRY MLT: CPT | Mod: HCNC,XB

## 2024-03-15 PROCEDURE — 85025 COMPLETE CBC W/AUTO DIFF WBC: CPT | Mod: HCNC | Performed by: NURSE PRACTITIONER

## 2024-03-15 PROCEDURE — 25000003 PHARM REV CODE 250: Mod: HCNC

## 2024-03-15 PROCEDURE — 85610 PROTHROMBIN TIME: CPT | Mod: HCNC | Performed by: STUDENT IN AN ORGANIZED HEALTH CARE EDUCATION/TRAINING PROGRAM

## 2024-03-15 PROCEDURE — 5A0945A ASSISTANCE WITH RESPIRATORY VENTILATION, 24-96 CONSECUTIVE HOURS, HIGH NASAL FLOW/VELOCITY: ICD-10-PCS | Performed by: STUDENT IN AN ORGANIZED HEALTH CARE EDUCATION/TRAINING PROGRAM

## 2024-03-15 PROCEDURE — 83735 ASSAY OF MAGNESIUM: CPT | Mod: HCNC | Performed by: NURSE PRACTITIONER

## 2024-03-15 RX ORDER — IBUPROFEN 200 MG
16 TABLET ORAL
Status: DISCONTINUED | OUTPATIENT
Start: 2024-03-15 | End: 2024-03-21 | Stop reason: HOSPADM

## 2024-03-15 RX ORDER — FUROSEMIDE 10 MG/ML
80 INJECTION INTRAMUSCULAR; INTRAVENOUS
Status: COMPLETED | OUTPATIENT
Start: 2024-03-15 | End: 2024-03-15

## 2024-03-15 RX ORDER — MAGNESIUM SULFATE HEPTAHYDRATE 40 MG/ML
2 INJECTION, SOLUTION INTRAVENOUS ONCE
Status: COMPLETED | OUTPATIENT
Start: 2024-03-15 | End: 2024-03-15

## 2024-03-15 RX ORDER — NALOXONE HCL 0.4 MG/ML
0.02 VIAL (ML) INJECTION
Status: DISCONTINUED | OUTPATIENT
Start: 2024-03-15 | End: 2024-03-21 | Stop reason: HOSPADM

## 2024-03-15 RX ORDER — IBUPROFEN 200 MG
24 TABLET ORAL
Status: DISCONTINUED | OUTPATIENT
Start: 2024-03-15 | End: 2024-03-21 | Stop reason: HOSPADM

## 2024-03-15 RX ORDER — ALLOPURINOL 300 MG/1
300 TABLET ORAL DAILY
Status: DISCONTINUED | OUTPATIENT
Start: 2024-03-16 | End: 2024-03-21 | Stop reason: HOSPADM

## 2024-03-15 RX ORDER — POTASSIUM CHLORIDE 7.45 MG/ML
10 INJECTION INTRAVENOUS ONCE
Status: DISCONTINUED | OUTPATIENT
Start: 2024-03-15 | End: 2024-03-15

## 2024-03-15 RX ORDER — SODIUM CHLORIDE 0.9 % (FLUSH) 0.9 %
10 SYRINGE (ML) INJECTION EVERY 12 HOURS PRN
Status: DISCONTINUED | OUTPATIENT
Start: 2024-03-15 | End: 2024-03-21 | Stop reason: HOSPADM

## 2024-03-15 RX ORDER — POTASSIUM CHLORIDE 750 MG/1
10 CAPSULE, EXTENDED RELEASE ORAL 2 TIMES DAILY
Status: DISCONTINUED | OUTPATIENT
Start: 2024-03-15 | End: 2024-03-15

## 2024-03-15 RX ORDER — POTASSIUM CHLORIDE 750 MG/1
30 CAPSULE, EXTENDED RELEASE ORAL EVERY 4 HOURS
Status: COMPLETED | OUTPATIENT
Start: 2024-03-16 | End: 2024-03-16

## 2024-03-15 RX ORDER — TALC
6 POWDER (GRAM) TOPICAL NIGHTLY PRN
Status: DISCONTINUED | OUTPATIENT
Start: 2024-03-15 | End: 2024-03-21 | Stop reason: HOSPADM

## 2024-03-15 RX ORDER — TORSEMIDE 20 MG/1
60 TABLET ORAL 2 TIMES DAILY
Status: DISCONTINUED | OUTPATIENT
Start: 2024-03-15 | End: 2024-03-15

## 2024-03-15 RX ORDER — ALUMINUM HYDROXIDE, MAGNESIUM HYDROXIDE, AND SIMETHICONE 1200; 120; 1200 MG/30ML; MG/30ML; MG/30ML
30 SUSPENSION ORAL 4 TIMES DAILY PRN
Status: DISCONTINUED | OUTPATIENT
Start: 2024-03-15 | End: 2024-03-21 | Stop reason: HOSPADM

## 2024-03-15 RX ORDER — GLUCAGON 1 MG
1 KIT INJECTION
Status: DISCONTINUED | OUTPATIENT
Start: 2024-03-15 | End: 2024-03-21 | Stop reason: HOSPADM

## 2024-03-15 RX ORDER — PANTOPRAZOLE SODIUM 40 MG/1
40 TABLET, DELAYED RELEASE ORAL DAILY
Status: DISCONTINUED | OUTPATIENT
Start: 2024-03-16 | End: 2024-03-21 | Stop reason: HOSPADM

## 2024-03-15 RX ORDER — WARFARIN SODIUM 5 MG/1
5 TABLET ORAL DAILY
Status: DISCONTINUED | OUTPATIENT
Start: 2024-03-16 | End: 2024-03-16

## 2024-03-15 RX ADMIN — SODIUM CHLORIDE 15 MG/HR: 9 INJECTION, SOLUTION INTRAVENOUS at 09:03

## 2024-03-15 RX ADMIN — POTASSIUM BICARBONATE 40 MEQ: 391 TABLET, EFFERVESCENT ORAL at 04:03

## 2024-03-15 RX ADMIN — MAGNESIUM SULFATE HEPTAHYDRATE 2 G: 40 INJECTION, SOLUTION INTRAVENOUS at 04:03

## 2024-03-15 RX ADMIN — POTASSIUM CHLORIDE 10 MEQ: 10 CAPSULE, COATED, EXTENDED RELEASE ORAL at 09:03

## 2024-03-15 RX ADMIN — FUROSEMIDE 80 MG: 10 INJECTION, SOLUTION INTRAVENOUS at 01:03

## 2024-03-15 NOTE — ED TRIAGE NOTES
"Yong Mcintyre, a 48 y.o. male presents to the ED w/ complaint of SOB and tiredness starting a few months ago. Patient states that he has gained 7-10 pounds within the last week.     Triage note:  Chief Complaint   Patient presents with    Shortness of Breath     On 3LNC home O2. Denies c/p. Has nausea       Review of patient's allergies indicates:   Allergen Reactions    Lipitor [atorvastatin] Other (See Comments)     "it makes my legs feel like jelly"  Other reaction(s): causes legs to hurt     Past Medical History:   Diagnosis Date    Arthritis     CHF (congestive heart failure)     Diastolic dysfunction    Cor pulmonale 11/05/2012    Gallstones     Hypertension     Morbid obesity 11/05/2012    Obesity hypoventilation syndrome     On home oxygen therapy 03/07/2014    MALLIKA (obstructive sleep apnea)     BPAP 16/11 with 3 L at night (continuous O2).    Paroxysmal atrial fibrillation     PFO (patent foramen ovale) 11/05/2012    status post closure    Pickwickian syndrome 11/05/2012    Pulmonary hypertension       "

## 2024-03-15 NOTE — ED NOTES
I-STAT Chem-8+ Results:   Value Reference Range   Sodium 134 136-145 mmol/L   Potassium  2.7 3.5-5.1 mmol/L   Chloride 81  mmol/L   Ionized Calcium 1.06 1.06-1.42 mmol/L   CO2 (measured) 47 23-29 mmol/L   Glucose 107  mg/dL   BUN 80 6-30 mg/dL   Creatinine 2.2 0.5-1.4 mg/dL   Hematocrit 58 36-54%

## 2024-03-15 NOTE — SUBJECTIVE & OBJECTIVE
"Past Medical History:   Diagnosis Date    Arthritis     CHF (congestive heart failure)     Diastolic dysfunction    Cor pulmonale 11/05/2012    Gallstones     Hypertension     Morbid obesity 11/05/2012    Obesity hypoventilation syndrome     On home oxygen therapy 03/07/2014    MALLIKA (obstructive sleep apnea)     BPAP 16/11 with 3 L at night (continuous O2).    Paroxysmal atrial fibrillation     PFO (patent foramen ovale) 11/05/2012    status post closure    Pickwickian syndrome 11/05/2012    Pulmonary hypertension        Past Surgical History:   Procedure Laterality Date    RIGHT HEART CATHETERIZATION Right 3/22/2022    Procedure: INSERTION, CATHETER, RIGHT HEART;  Surgeon: Tony Martínez MD;  Location: [facility] CATH LAB;  Service: Cardiology;  Laterality: Right;    RIGHT HEART CATHETERIZATION Right 1/31/2024    Procedure: INSERTION, CATHETER, RIGHT HEART;  Surgeon: Sheri Ritter MD;  Location: [facility] CATH LAB;  Service: Cardiology;  Laterality: Right;       Review of patient's allergies indicates:   Allergen Reactions    Lipitor [atorvastatin] Other (See Comments)     "it makes my legs feel like jelly"  Other reaction(s): causes legs to hurt       No current facility-administered medications on file prior to encounter.     Current Outpatient Medications on File Prior to Encounter   Medication Sig    acetaminophen (TYLENOL ARTHRITIS ORAL) Take 2 tablets by mouth daily as needed (pain).    allopurinoL (ZYLOPRIM) 300 MG tablet Take 1 tablet (300 mg total) by mouth once daily.    ascorbic acid (VITAMIN C ORAL) Take 1 tablet by mouth once daily.    b complex vitamins tablet Take 1 tablet by mouth once daily.    CALCIUM ORAL Take 1 capsule by mouth once daily.    pantoprazole (PROTONIX) 40 MG tablet Take 1 tablet (40 mg total) by mouth once daily.    torsemide (DEMADEX) 20 MG Tab Take 3 tablets (60 mg total) by mouth 2 (two) times a day.    albuterol (VENTOLIN HFA) 90 mcg/actuation inhaler Inhale 2 puffs into the lungs every " 4 (four) hours as needed for Wheezing. Rescue    multivit,calc,min/FA/K1/lycop (ONE-A-DAY MEN'S COMPLETE ORAL) Take 1 tablet by mouth once daily.    omega-3 fatty acids/fish oil (FISH OIL-OMEGA-3 FATTY ACIDS) 300-1,000 mg capsule Take 1 capsule by mouth once daily.    polyethylene glycol 400 (VISINE DRY EYE RELIEF) 1 % Drop Place 2 drops into both eyes daily as needed (dry eyes).    potassium chloride (MICRO-K) 10 MEQ CpSR Take 10 mEq by mouth 2 (two) times daily.    tiotropium (SPIRIVA) 18 mcg inhalation capsule Inhale 18 mcg into the lungs once daily.    warfarin (COUMADIN) 10 MG tablet Take 1/2 tablet (5 mg) by mouth daily or as directed by [facility] (Patient taking differently: Take 10 mg by mouth. Take 1 table by mouth every Sunday and 1/2 (5 mg)tablet by mouth on all other days.)    WIXELA INHUB 250-50 mcg/dose diskus inhaler 1 puff 2 (two) times daily. USE 1 INHALATION BY MOUTH TWICE DAILY    [DISCONTINUED] sildenafil (REVATIO) 20 mg Tab Take 1 tablet (20 mg total) by mouth 3 (three) times daily.     Family History       Problem Relation (Age of Onset)    Arthritis Mother, Maternal Grandmother, Maternal Grandfather    Asthma Mother, Sister, Maternal Grandmother    Breast cancer Paternal Grandmother    Diabetes Maternal Grandmother    Down syndrome Brother    Gout Brother    Hypertension Father, Maternal Grandmother, Maternal Grandfather, Paternal Grandfather          Tobacco Use    Smoking status: Never    Smokeless tobacco: Never   Substance and Sexual Activity    Alcohol use: Yes     Comment: holidays  rare    Drug use: No    Sexual activity: Not Currently     Partners: Female     Review of Systems   Constitutional:  Positive for activity change, fatigue and unexpected weight change.   Respiratory:  Positive for shortness of breath. Negative for cough.    Cardiovascular:  Positive for leg swelling. Negative for chest pain.   Gastrointestinal:  Positive for abdominal pain and nausea. Negative for  constipation, diarrhea and vomiting.   Genitourinary:  Negative for frequency and urgency.   Musculoskeletal:  Negative for arthralgias and myalgias.   Neurological:  Positive for weakness. Negative for syncope, light-headedness and headaches.   Psychiatric/Behavioral:  Negative for agitation, confusion and decreased concentration.      Objective:     Vital Signs (Most Recent):  Temp: 97.7 °F (36.5 °C) (03/15/24 1128)  Pulse: 101 (03/15/24 1600)  Resp: 18 (03/15/24 1600)  BP: 106/64 (03/15/24 1600)  SpO2: (!) 93 % (03/15/24 1600) Vital Signs (24h Range):  Temp:  [97.7 °F (36.5 °C)] 97.7 °F (36.5 °C)  Pulse:  [101-107] 101  Resp:  [18-22] 18  SpO2:  [86 %-94 %] 93 %  BP: ()/(58-67) 106/64     Weight: 111.6 kg (246 lb)  Body mass index is 40.94 kg/m².     Physical Exam  Constitutional:       Appearance: He is obese.      Comments: Respiratory distress   Cardiovascular:      Rate and Rhythm: Normal rate. Rhythm irregular.      Pulses: Normal pulses.      Heart sounds: Normal heart sounds.   Pulmonary:      Effort: Pulmonary effort is normal.      Breath sounds: Rhonchi present.   Abdominal:      General: Bowel sounds are normal.      Palpations: Abdomen is soft.   Musculoskeletal:      Right lower leg: Edema present.      Left lower leg: Edema present.   Skin:     Findings: Bruising and rash present.      Comments: Lower extremities have hyperpigmentation and varicose veins   Neurological:      General: No focal deficit present.      Mental Status: He is alert and oriented to person, place, and time.   Psychiatric:         Mood and Affect: Mood normal.         Behavior: Behavior normal.         Thought Content: Thought content normal.                Significant Labs: All pertinent labs within the past 24 hours have been reviewed.    Significant Imaging: I have reviewed all pertinent imaging results/findings within the past 24 hours.

## 2024-03-15 NOTE — ASSESSMENT & PLAN NOTE
Patient with Paroxysmal (<7 days) atrial fibrillation which is controlled currently.    . Patient is currently in sinus rhythm.[id number] Score: The patient doesn't have any registry metric data available.. Anticoagulation indicated. Anticoagulation done with warfarin  .  -Daily INR

## 2024-03-15 NOTE — FIRST PROVIDER EVALUATION
" Emergency Department TeleTriage Encounter Note      CHIEF COMPLAINT    Chief Complaint   Patient presents with    Shortness of Breath     On 3LNC home O2. Denies c/p. Has nausea         VITAL SIGNS   Initial Vitals   BP Pulse Resp Temp SpO2   03/15/24 1128 03/15/24 1128 03/15/24 1128 03/15/24 1128 03/15/24 1136   (!) 99/58 107 18 97.7 °F (36.5 °C) (!) 90 %      MAP       --                   ALLERGIES    Review of patient's allergies indicates:   Allergen Reactions    Lipitor [atorvastatin] Other (See Comments)     "it makes my legs feel like jelly"  Other reaction(s): causes legs to hurt       PROVIDER TRIAGE NOTE  This is a teletriage evaluation of a 48 y.o. male presenting to the ED with c/o increased SOB.  Pt states he has abdominal distension.  Pt states this is causing SOB.  Pt wears home O2 (3L) but symptoms have worsened.       PE: hypoxic, tachycardic and mildly hypotensive on initial exam.  Speaking in full sentences.      Plan: labs, imaging, oxygen, ekg    Limited physical exam via telehealth: The patient is awake, alert, answering questions appropriately and is not in respiratory distress. All ED beds are full at present; patient notified of this status.  Patient seen and medically screened by Nurse Practitioner via teletriage.      Initial orders will be placed and care will be transferred to an alternate provider when patient is roomed for a full evaluation. Any additional orders and the final disposition will be determined by that provider.         ORDERS  Labs Reviewed   CBC W/ AUTO DIFFERENTIAL   COMPREHENSIVE METABOLIC PANEL   TROPONIN I   B-TYPE NATRIURETIC PEPTIDE       ED Orders (720h ago, onward)      Start Ordered     Status Ordering Provider    03/15/24 1159 03/15/24 1158  Oxygen Continuous  Continuous         Ordered HUONG JACKSON    03/15/24 1158 03/15/24 1158  Vital signs  Every 30 min         Ordered HUONG JACKSON    03/15/24 1158 03/15/24 1158  Cardiac Monitoring - Adult  " Continuous        Comments: Notify Physician If:    Ordered HUONG JACKSON.    03/15/24 1158 03/15/24 1158  Pulse Oximetry Continuous  Continuous         Ordered HUONG JACKSON.    03/15/24 1158 03/15/24 1158  Insert peripheral IV  Once         Ordered HUONG JACKSON.    03/15/24 1158 03/15/24 1158  CBC auto differential  STAT         Ordered HUONG JACKSON.    03/15/24 1158 03/15/24 1158  Comprehensive metabolic panel  STAT         Ordered HUONG JACKSON.    03/15/24 1158 03/15/24 1158  Troponin I  STAT         Ordered HUONG JACKSON.    03/15/24 1158 03/15/24 1158  Brain natriuretic peptide  STAT         Ordered HUONG JACKSON.    03/15/24 1158 03/15/24 1158  X-Ray Chest AP Portable  1 time imaging         Ordered HUONG JACKSON.    03/15/24 1138 03/15/24 1137  EKG 12-lead  Once         Completed by JASMYN MANRIQUE on 3/15/2024 at 11:47 AM DIDI LUCAS              Virtual Visit Note: The provider triage portion of this emergency department evaluation and documentation was performed via [employer], a HIPAA-compliant telemedicine application, in concert with a tele-presenter in the room. A face to face patient evaluation with one of my colleagues will occur once the patient is placed in an emergency department room.      DISCLAIMER: This note was prepared with [employer] voice recognition transcription software. Garbled syntax, mangled pronouns, and other bizarre constructions may be attributed to that software system.

## 2024-03-15 NOTE — ASSESSMENT & PLAN NOTE
Patient has a history of pulmonary hypertension with severe right ventricular enlargement. Wall thickness is normal. Right ventricle wall motion has global hypokinesis. Systolic function is severely reduced. Metoprolol was held because of pulmonary hypertension.

## 2024-03-15 NOTE — ED PROVIDER NOTES
"Encounter Date: 3/15/2024       History     Chief Complaint   Patient presents with    Shortness of Breath     On 3LNC home O2. Denies c/p. Has nausea       HPI    47 yo male w/HFpEF, obesity hypoventilation,HTN, severe pulm HTN on 3L home O2, PFO, afib on warfarin,  presenting with dyspnea, nausea x 5 days.    Reports orthopnea, bilateral leg edema has worsened in the last 5 days as well, and has had a 7-10 lb weight gain in the last week.  Has been compliant with his torsemide.    Denies fever/chills, no productive cough, no vomiting.  Reports abdominal bloating after eating, no pain.  No diarrhea.    Per chart review, most recent echo done 01/2024, with severe right ventricle hypokinesis, tricuspid regurg, moderate to severe pulmonary hypertension, normal systolic function of LV, EF 55%.      Review of patient's allergies indicates:   Allergen Reactions    Lipitor [atorvastatin] Other (See Comments)     "it makes my legs feel like jelly"  Other reaction(s): causes legs to hurt     Past Medical History:   Diagnosis Date    Arthritis     CHF (congestive heart failure)     Diastolic dysfunction    Cor pulmonale 11/05/2012    Gallstones     Hypertension     Morbid obesity 11/05/2012    Obesity hypoventilation syndrome     On home oxygen therapy 03/07/2014    MALLIKA (obstructive sleep apnea)     BPAP 16/11 with 3 L at night (continuous O2).    Paroxysmal atrial fibrillation     PFO (patent foramen ovale) 11/05/2012    status post closure    Pickwickian syndrome 11/05/2012    Pulmonary hypertension      Past Surgical History:   Procedure Laterality Date    RIGHT HEART CATHETERIZATION Right 3/22/2022    Procedure: INSERTION, CATHETER, RIGHT HEART;  Surgeon: Tony Martínez MD;  Location: [facility] CATH LAB;  Service: Cardiology;  Laterality: Right;    RIGHT HEART CATHETERIZATION Right 1/31/2024    Procedure: INSERTION, CATHETER, RIGHT HEART;  Surgeon: Sheri Ritter MD;  Location: [facility] CATH LAB;  Service: Cardiology;  " Laterality: Right;     Family History   Problem Relation Age of Onset    Arthritis Mother     Asthma Mother     Hypertension Father     Asthma Sister     Arthritis Maternal Grandmother     Asthma Maternal Grandmother     Diabetes Maternal Grandmother     Hypertension Maternal Grandmother     Arthritis Maternal Grandfather     Hypertension Maternal Grandfather     Hypertension Paternal Grandfather     Breast cancer Paternal Grandmother     Down syndrome Brother     Gout Brother      Social History     Tobacco Use    Smoking status: Never    Smokeless tobacco: Never   Substance Use Topics    Alcohol use: Yes     Comment: holidays  rare    Drug use: No     Review of Systems    Physical Exam     Initial Vitals   BP Pulse Resp Temp SpO2   03/15/24 1128 03/15/24 1128 03/15/24 1128 03/15/24 1128 03/15/24 1136   (!) 99/58 107 18 97.7 °F (36.5 °C) (!) 90 %      MAP       --                Physical Exam    HENT:   Head: Normocephalic.   Eyes: EOM are normal.   Neck: Neck supple.   Cardiovascular:            Tachycardic   Pulmonary/Chest: No respiratory distress. He has rhonchi.   Satting 82% on 3 L nasal cannula, no respiratory distress, tachypneic, speaking full sentences   Abdominal: Abdomen is soft. He exhibits no distension. There is no abdominal tenderness. There is no rebound.   Musculoskeletal:         General: Edema present. Normal range of motion.      Cervical back: Neck supple.     Neurological: He is alert and oriented to person, place, and time.   Skin: Skin is warm and dry.         ED Course   Procedures  Labs Reviewed   CBC W/ AUTO DIFFERENTIAL - Abnormal; Notable for the following components:       Result Value    RBC 6.21 (*)     Hematocrit 55.9 (*)     MCH 25.8 (*)     MCHC 28.6 (*)     RDW 21.2 (*)     Gran % 75.6 (*)     Lymph % 12.0 (*)     All other components within normal limits   COMPREHENSIVE METABOLIC PANEL - Abnormal; Notable for the following components:    Sodium 134 (*)     Chloride 84 (*)      CO2 36 (*)     Glucose 114 (*)     BUN 91 (*)     Creatinine 2.4 (*)     Albumin 3.1 (*)     Total Bilirubin 1.4 (*)     Alkaline Phosphatase 138 (*)     eGFR 32.5 (*)     All other components within normal limits   TROPONIN I - Abnormal; Notable for the following components:    Troponin I 0.038 (*)     All other components within normal limits   B-TYPE NATRIURETIC PEPTIDE - Abnormal; Notable for the following components:     (*)     All other components within normal limits   PROTIME-INR - Abnormal; Notable for the following components:    Prothrombin Time 23.5 (*)     INR 2.3 (*)     All other components within normal limits   MAGNESIUM - Abnormal; Notable for the following components:    Magnesium 1.5 (*)     All other components within normal limits    Narrative:     ADD ON MAG PER TORI WEAVER MD ORDER# [id number] @  03/15/2024    15:06    TROPONIN I - Abnormal; Notable for the following components:    Troponin I 0.042 (*)     All other components within normal limits   ISTAT PROCEDURE - Abnormal; Notable for the following components:    POC BUN 80 (*)     POC Creatinine 2.2 (*)     POC Sodium 134 (*)     POC Potassium 2.7 (*)     POC Chloride 81 (*)     POC TCO2 (MEASURED) 47 (*)     POC Hematocrit 58 (*)     All other components within normal limits   ISTAT PROCEDURE - Abnormal; Notable for the following components:    POC PCO2 75.7 (*)     POC PO2 29 (*)     POC HCO3 48.1 (*)     POC BE 23 (*)     POC TCO2 >50 (*)     All other components within normal limits   MAGNESIUM   ISTAT CHEM8        ECG Results              EKG 12-lead (Final result)        Collection Time Result Time QRS Duration OHS QTC Calculation    03/15/24 11:41:39 03/15/24 16:10:58 108 491                     Final result by Interface, Lab In [facility] (03/15/24 16:11:07)                   Narrative:    Test Reason : R06.02,    Vent. Rate : 107 BPM     Atrial Rate : 107 BPM     P-R Int : 178 ms          QRS Dur : 108 ms      QT Int :  368 ms       P-R-T Axes : 056 116 -54 degrees     QTc Int : 491 ms    Sinus tachycardia  Right axis deviation  ST and T wave abnormality, consider inferolateral ischemia  Abnormal ECG  When compared with ECG of 31-JAN-2024 12:16,  No significant change was found  Confirmed by Carmella Weiss MD ([id number]) on 3/15/2024 4:10:52 PM    Referred By: [email or web address]   SELF           Confirmed By:Carmella Weiss MD                                  Imaging Results              CT Abdomen Pelvis  Without Contrast (Final result)  Result time 03/15/24 14:35:39      Final result by Alejandro Jackson MD (03/15/24 14:35:39)                   Impression:      1. No findings to suggest obstructive uropathy.  2. Pulmonary findings suggest edema, correlation is advised.  3. Moderate stool in the right colon may reflect developing constipation.  4. Please see above for additional findings.      Electronically signed by: Alejandro Jackson MD  Date:    03/15/2024  Time:    14:35               Narrative:    EXAMINATION:  CT ABDOMEN PELVIS WITHOUT CONTRAST    CLINICAL HISTORY:  Abdominal pain, acute, nonlocalized;    TECHNIQUE:  Low dose axial images, sagittal and coronal reformations were obtained from the lung bases to the pubic symphysis.  Oral contrast was not administered.    COMPARISON:  CT chest 05/03/2023 MRI abdomen 01/28/2013    FINDINGS:  Images of the lower thorax are remarkable for bilateral dependent atelectasis.  There is patchy ground-glass attenuation bilaterally suggesting underlying edema.  The heart is prominent.  There is a small pericardial effusion.    The liver and adrenal glands have a grossly unremarkable noncontrast appearance.  The spleen is prominent.  The pancreas is unremarkable.  The gallbladder is surgically absent.  No significant biliary dilation status post cholecystectomy.  The stomach is decompressed without wall thickening.  No significant abdominal lymphadenopathy.  There may be varices at the splenic  hilum.    Allowing for motion artifact, there is bilateral perinephric fat stranding, mild.  No hydronephrosis or nephrolithiasis.  The bilateral ureters are unable to be followed in their entirety to the urinary bladder, no definite calculi seen or secondary findings to suggest obstructive uropathy.  The urinary bladder is mildly distended without wall thickening.  The prostate is not enlarged.    There are a few scattered colonic diverticula without inflammation.  There is moderate stool in the right colon.  The terminal ileum is unremarkable.  The appendix is unremarkable.  The small bowel is grossly unremarkable.  There are a few scattered shotty to mildly prominent periaortic, pericaval, and mesenteric lymph nodes.  No focal organized pelvic fluid collection.    There are degenerative changes of the spine.  No significant inguinal lymphadenopathy.                                       X-Ray Chest AP Portable (Final result)  Result time 03/15/24 14:06:55      Final result by Alejandro Jackson MD (03/15/24 14:06:55)                   Impression:      1. Pulmonary findings suggest edema or other nonspecific pneumonitis.  Correlation and follow-up advised.      Electronically signed by: Alejandro Jackson MD  Date:    03/15/2024  Time:    14:06               Narrative:    EXAMINATION:  XR CHEST AP PORTABLE    CLINICAL HISTORY:  CHF;    TECHNIQUE:  Single frontal view of the chest was performed.    COMPARISON:  01/29/2024    FINDINGS:  The cardiomediastinal silhouette is prominent, similar in configuration to the previous exam..  There is no pleural effusion.  The trachea is midline.  The lungs are symmetrically expanded bilaterally with patchy increased interstitial and parenchymal attenuation bilaterally, suggesting edema..  No large focal consolidation seen.  There is no pneumothorax.  The osseous structures are remarkable for degenerative change..                                       Medications   allopurinoL  tablet 300 mg (has no administration in time range)   pantoprazole EC tablet 40 mg (has no administration in time range)   potassium chloride CR capsule 10 mEq (has no administration in time range)   torsemide tablet 60 mg (has no administration in time range)   warfarin (COUMADIN) tablet 5 mg (has no administration in time range)   sodium chloride 0.9% flush 10 mL (has no administration in time range)   naloxone 0.4 mg/mL injection 0.02 mg (has no administration in time range)   glucose chewable tablet 16 g (has no administration in time range)   glucose chewable tablet 24 g (has no administration in time range)   glucagon (human recombinant) injection 1 mg (has no administration in time range)   melatonin tablet 6 mg (has no administration in time range)   aluminum-magnesium hydroxide-simethicone 200-200-20 mg/5 mL suspension 30 mL (has no administration in time range)   dextrose 10% bolus 125 mL 125 mL (has no administration in time range)   dextrose 10% bolus 250 mL 250 mL (has no administration in time range)   furosemide injection 80 mg (80 mg Intravenous Given 3/15/24 1334)   magnesium sulfate 2g in water 50mL IVPB (premix) (0 g Intravenous Stopped 3/15/24 1643)   potassium bicarbonate disintegrating tablet 40 mEq (40 mEq Oral Given 3/15/24 1607)     Medical Decision Making  49 yo male w/HFpEF, obesity hypoventilation,HTN, severe pulm HTN on 3L home O2, PFO, afib on warfarin, presenting with dyspnea, nausea x 5 days.    Differential diagnosis includes but is not limited to:  Decompensated pulmonary hypertension/HFpEF exacerbation, anasarca, pleural effusions, pneumonia, PE    Has been compliant with warfarin, with INR measured between 1.9-3.0 over the last several weeks with no pleuritic chest pain, has right atrial enlargement and right axis deviation at baseline, likely due to severe pulmonary hypertension, PE is currently not leading diagnosis.  Fluid overload, with weight gain of 7-10 lb in the last week,  and decompensated HfpEF is more likely cause of patient's increased O2 requirement at this time.    Given 80 of Lasix IV, with 800 cc output.     CT abdomen pelvis obtained for epigastric tenderness, with no acute intra-abdominal infection or obstruction, bilateral pleural effusion seen, with some constipation.    Repeat EKG performed after Lasix was given with prolonged QTC >600, suspect this is due to hypo K or hypo magnesium, repeat labs sent.  Initial potassium was 3.5, normal.    On i-STAT, potassium found to be 2.7, repleted conservatively with 40 mEq orally given patient's CKD, creatinine of 2.2, findings discussed with admitting service.    Discussed case with hospitalist, admitted to Hospital Medicine for heart failure exacerbation with increased O2 requirement.    Please put in 30 minutes of critical care due to patient having a high risk of acute respiratory failure requiring increased supplementary O2, lasix, and hypokalemia, hypomag requiring IV repletion.    Separate from teaching and exclusive of procedure and ekg time  Includes:  Time at bedside  Time reviewing test results  Time discussing case with staff  Time documenting the medical record  Time spent with family members  Time spent with consults  Management      Amount and/or Complexity of Data Reviewed  Labs: ordered. Decision-making details documented in ED Course.  Radiology: ordered.    Risk  Prescription drug management.  Decision regarding hospitalization.               ED Course as of 03/15/24 1807   Fri Mar 15, 2024   1259 CO2(!): 36  Elevated bicarb on CMP to 36, suggestive of chronic metabolic compensation for respiratory acidosis [LF]   1259 EKG independently interpreted by me with sinus tachycardia, rate of 107, nonspecific ST changes, T-wave inversions in inferior and anterolateral leads, which are present on prior EKG done 01/2024, , no STEMI [LF]      ED Course User Index  [LF] Selena Paulino MD                            Clinical Impression:  Final diagnoses:  [R06.00] Dyspnea          ED Disposition Condition    Admit                 Selena Paulino MD  03/15/24 1665

## 2024-03-15 NOTE — HPI
Mr. Mcintyre is a 47 y/o male with a PMH of HFrEF, HTN, pHTN, Chronic hypoxic respiratory failure (baseline oxygen req at home is 3 L), PFO (unsuccessful closure in 2006), AF (on coumadin), Obesity hypoventilation syndrome, Morbid obesity presenting to the ED with abdominal bloating, dyspnea for the last 4 days, with weight gain of 10 lbs this week. His dry weight is 231 and this morning was weighing 246.Satting 82% on his home O2 of 3 L, currently satting 94% on 5-6L. Patient reports he has been having this epigastric bloating and associated belching that has been reoccurring. Patient reports that he visited his PCP, and suspected GERD and prescribed him Protonix with little improvement. Patient denies fever, N/V/D, no changes in BM, no increasing passing of gas, no chills, dysuria, flank pain, does endorse orthopnea. Patient does admit to SOB (seems to be chronic but worsens with bloating), belching, and feeling mildly nauseous. Patient has been adherent to medication regimen consisting diuretics and 2 g sodium diet. No new sick contacts. Performs ADL's and tries to works out.       In the ED /64, , RR 18, on 6L oxygen via NC, Labs significant for INR 2.3 (goal 2-3), Na 134, K 2.7, , troponin 0.042, Bun 91, Cr 2.4, PcO2 75.7, HCO3 48.1, he was given 80 lasix in ED with 500cc UOP so far also found to have long Qtc on repeat EKG.

## 2024-03-16 PROBLEM — N18.30 CKD (CHRONIC KIDNEY DISEASE), STAGE III: Chronic | Status: ACTIVE | Noted: 2023-12-11

## 2024-03-16 LAB
ALBUMIN SERPL BCP-MCNC: 2.8 G/DL (ref 3.5–5.2)
ALP SERPL-CCNC: 122 U/L (ref 55–135)
ALT SERPL W/O P-5'-P-CCNC: 15 U/L (ref 10–44)
ANION GAP SERPL CALC-SCNC: 10 MMOL/L (ref 8–16)
ANION GAP SERPL CALC-SCNC: 12 MMOL/L (ref 8–16)
ANION GAP SERPL CALC-SCNC: 13 MMOL/L (ref 8–16)
ANION GAP SERPL CALC-SCNC: 14 MMOL/L (ref 8–16)
AST SERPL-CCNC: 20 U/L (ref 10–40)
BASOPHILS # BLD AUTO: 0.06 K/UL (ref 0–0.2)
BASOPHILS NFR BLD: 0.7 % (ref 0–1.9)
BILIRUB SERPL-MCNC: 1.6 MG/DL (ref 0.1–1)
BUN SERPL-MCNC: 76 MG/DL (ref 6–20)
BUN SERPL-MCNC: 82 MG/DL (ref 6–20)
BUN SERPL-MCNC: 86 MG/DL (ref 6–20)
BUN SERPL-MCNC: 88 MG/DL (ref 6–20)
CALCIUM SERPL-MCNC: 10 MG/DL (ref 8.7–10.5)
CALCIUM SERPL-MCNC: 10 MG/DL (ref 8.7–10.5)
CALCIUM SERPL-MCNC: 10.2 MG/DL (ref 8.7–10.5)
CALCIUM SERPL-MCNC: 9.7 MG/DL (ref 8.7–10.5)
CHLORIDE SERPL-SCNC: 84 MMOL/L (ref 95–110)
CHLORIDE SERPL-SCNC: 85 MMOL/L (ref 95–110)
CO2 SERPL-SCNC: 42 MMOL/L (ref 23–29)
CREAT SERPL-MCNC: 1.8 MG/DL (ref 0.5–1.4)
CREAT SERPL-MCNC: 2 MG/DL (ref 0.5–1.4)
CREAT SERPL-MCNC: 2.1 MG/DL (ref 0.5–1.4)
CREAT SERPL-MCNC: 2.2 MG/DL (ref 0.5–1.4)
DIFFERENTIAL METHOD BLD: ABNORMAL
EOSINOPHIL # BLD AUTO: 0 K/UL (ref 0–0.5)
EOSINOPHIL NFR BLD: 0.3 % (ref 0–8)
ERYTHROCYTE [DISTWIDTH] IN BLOOD BY AUTOMATED COUNT: 21 % (ref 11.5–14.5)
EST. GFR  (NO RACE VARIABLE): 36 ML/MIN/1.73 M^2
EST. GFR  (NO RACE VARIABLE): 38.1 ML/MIN/1.73 M^2
EST. GFR  (NO RACE VARIABLE): 40.4 ML/MIN/1.73 M^2
EST. GFR  (NO RACE VARIABLE): 45.9 ML/MIN/1.73 M^2
GLUCOSE SERPL-MCNC: 108 MG/DL (ref 70–110)
GLUCOSE SERPL-MCNC: 111 MG/DL (ref 70–110)
GLUCOSE SERPL-MCNC: 114 MG/DL (ref 70–110)
GLUCOSE SERPL-MCNC: 123 MG/DL (ref 70–110)
HCT VFR BLD AUTO: 53 % (ref 40–54)
HGB BLD-MCNC: 15.3 G/DL (ref 14–18)
IMM GRANULOCYTES # BLD AUTO: 0.01 K/UL (ref 0–0.04)
IMM GRANULOCYTES NFR BLD AUTO: 0.1 % (ref 0–0.5)
INR PPP: 2.2 (ref 0.8–1.2)
LACTATE SERPL-SCNC: 0.9 MMOL/L (ref 0.5–2.2)
LYMPHOCYTES # BLD AUTO: 1.3 K/UL (ref 1–4.8)
LYMPHOCYTES NFR BLD: 15.4 % (ref 18–48)
MAGNESIUM SERPL-MCNC: 1.8 MG/DL (ref 1.6–2.6)
MAGNESIUM SERPL-MCNC: 2.1 MG/DL (ref 1.6–2.6)
MAGNESIUM SERPL-MCNC: 2.3 MG/DL (ref 1.6–2.6)
MCH RBC QN AUTO: 25.6 PG (ref 27–31)
MCHC RBC AUTO-ENTMCNC: 28.9 G/DL (ref 32–36)
MCV RBC AUTO: 89 FL (ref 82–98)
MONOCYTES # BLD AUTO: 0.6 K/UL (ref 0.3–1)
MONOCYTES NFR BLD: 7.4 % (ref 4–15)
NEUTROPHILS # BLD AUTO: 6.6 K/UL (ref 1.8–7.7)
NEUTROPHILS NFR BLD: 76.1 % (ref 38–73)
NRBC BLD-RTO: 0 /100 WBC
PHOSPHATE SERPL-MCNC: 4 MG/DL (ref 2.7–4.5)
PLATELET # BLD AUTO: 214 K/UL (ref 150–450)
PMV BLD AUTO: 9.8 FL (ref 9.2–12.9)
POTASSIUM SERPL-SCNC: 3 MMOL/L (ref 3.5–5.1)
POTASSIUM SERPL-SCNC: 3.4 MMOL/L (ref 3.5–5.1)
POTASSIUM SERPL-SCNC: 3.5 MMOL/L (ref 3.5–5.1)
POTASSIUM SERPL-SCNC: 3.8 MMOL/L (ref 3.5–5.1)
PROT SERPL-MCNC: 7.5 G/DL (ref 6–8.4)
PROTHROMBIN TIME: 22.2 SEC (ref 9–12.5)
RBC # BLD AUTO: 5.98 M/UL (ref 4.6–6.2)
SODIUM SERPL-SCNC: 137 MMOL/L (ref 136–145)
SODIUM SERPL-SCNC: 138 MMOL/L (ref 136–145)
SODIUM SERPL-SCNC: 140 MMOL/L (ref 136–145)
SODIUM SERPL-SCNC: 141 MMOL/L (ref 136–145)
WBC # BLD AUTO: 8.69 K/UL (ref 3.9–12.7)

## 2024-03-16 PROCEDURE — 20600001 HC STEP DOWN PRIVATE ROOM: Mod: HCNC

## 2024-03-16 PROCEDURE — 94660 CPAP INITIATION&MGMT: CPT | Mod: HCNC

## 2024-03-16 PROCEDURE — 85610 PROTHROMBIN TIME: CPT | Mod: HCNC

## 2024-03-16 PROCEDURE — 25000242 PHARM REV CODE 250 ALT 637 W/ HCPCS: Mod: HCNC

## 2024-03-16 PROCEDURE — 36415 COLL VENOUS BLD VENIPUNCTURE: CPT | Mod: HCNC | Performed by: FAMILY MEDICINE

## 2024-03-16 PROCEDURE — 83735 ASSAY OF MAGNESIUM: CPT | Mod: HCNC

## 2024-03-16 PROCEDURE — 85025 COMPLETE CBC W/AUTO DIFF WBC: CPT | Mod: HCNC

## 2024-03-16 PROCEDURE — 84100 ASSAY OF PHOSPHORUS: CPT | Mod: HCNC

## 2024-03-16 PROCEDURE — 63600175 PHARM REV CODE 636 W HCPCS: Mod: HCNC

## 2024-03-16 PROCEDURE — 25000003 PHARM REV CODE 250: Mod: HCNC

## 2024-03-16 PROCEDURE — 80048 BASIC METABOLIC PNL TOTAL CA: CPT | Mod: 91,HCNC,XB

## 2024-03-16 PROCEDURE — 94640 AIRWAY INHALATION TREATMENT: CPT | Mod: HCNC

## 2024-03-16 PROCEDURE — 83605 ASSAY OF LACTIC ACID: CPT | Mod: HCNC | Performed by: FAMILY MEDICINE

## 2024-03-16 PROCEDURE — 94761 N-INVAS EAR/PLS OXIMETRY MLT: CPT | Mod: HCNC

## 2024-03-16 PROCEDURE — 27100171 HC OXYGEN HIGH FLOW UP TO 24 HOURS: Mod: HCNC

## 2024-03-16 PROCEDURE — 27000221 HC OXYGEN, UP TO 24 HOURS: Mod: HCNC

## 2024-03-16 PROCEDURE — 80053 COMPREHEN METABOLIC PANEL: CPT | Mod: HCNC

## 2024-03-16 PROCEDURE — 83735 ASSAY OF MAGNESIUM: CPT | Mod: 91,HCNC

## 2024-03-16 PROCEDURE — 36415 COLL VENOUS BLD VENIPUNCTURE: CPT | Mod: HCNC,XB

## 2024-03-16 PROCEDURE — 99900035 HC TECH TIME PER 15 MIN (STAT): Mod: HCNC

## 2024-03-16 RX ORDER — FLUTICASONE FUROATE AND VILANTEROL 100; 25 UG/1; UG/1
1 POWDER RESPIRATORY (INHALATION) DAILY
Status: DISCONTINUED | OUTPATIENT
Start: 2024-03-16 | End: 2024-03-21 | Stop reason: HOSPADM

## 2024-03-16 RX ORDER — POTASSIUM CHLORIDE 750 MG/1
30 CAPSULE, EXTENDED RELEASE ORAL EVERY 4 HOURS
Status: COMPLETED | OUTPATIENT
Start: 2024-03-16 | End: 2024-03-16

## 2024-03-16 RX ORDER — SODIUM CHLORIDE 0.9 % (FLUSH) 0.9 %
10 SYRINGE (ML) INJECTION
Status: DISCONTINUED | OUTPATIENT
Start: 2024-03-16 | End: 2024-03-21 | Stop reason: HOSPADM

## 2024-03-16 RX ORDER — POTASSIUM CHLORIDE 20 MEQ/1
40 TABLET, EXTENDED RELEASE ORAL
Status: COMPLETED | OUTPATIENT
Start: 2024-03-16 | End: 2024-03-16

## 2024-03-16 RX ORDER — MAGNESIUM SULFATE HEPTAHYDRATE 40 MG/ML
2 INJECTION, SOLUTION INTRAVENOUS ONCE
Status: COMPLETED | OUTPATIENT
Start: 2024-03-16 | End: 2024-03-16

## 2024-03-16 RX ORDER — ACETAMINOPHEN 325 MG/1
650 TABLET ORAL EVERY 6 HOURS PRN
Status: DISCONTINUED | OUTPATIENT
Start: 2024-03-16 | End: 2024-03-21 | Stop reason: HOSPADM

## 2024-03-16 RX ORDER — WARFARIN SODIUM 5 MG/1
5 TABLET ORAL DAILY
Status: DISCONTINUED | OUTPATIENT
Start: 2024-03-16 | End: 2024-03-21

## 2024-03-16 RX ADMIN — POTASSIUM CHLORIDE 30 MEQ: 10 CAPSULE, COATED, EXTENDED RELEASE ORAL at 02:03

## 2024-03-16 RX ADMIN — POTASSIUM CHLORIDE 30 MEQ: 10 CAPSULE, COATED, EXTENDED RELEASE ORAL at 05:03

## 2024-03-16 RX ADMIN — POTASSIUM CHLORIDE 30 MEQ: 10 CAPSULE, COATED, EXTENDED RELEASE ORAL at 09:03

## 2024-03-16 RX ADMIN — POTASSIUM CHLORIDE 40 MEQ: 1500 TABLET, EXTENDED RELEASE ORAL at 09:03

## 2024-03-16 RX ADMIN — MAGNESIUM SULFATE HEPTAHYDRATE 2 G: 40 INJECTION, SOLUTION INTRAVENOUS at 09:03

## 2024-03-16 RX ADMIN — SODIUM CHLORIDE 15 MG/HR: 9 INJECTION, SOLUTION INTRAVENOUS at 11:03

## 2024-03-16 RX ADMIN — POTASSIUM CHLORIDE 30 MEQ: 10 CAPSULE, COATED, EXTENDED RELEASE ORAL at 06:03

## 2024-03-16 RX ADMIN — SODIUM CHLORIDE 15 MG/HR: 9 INJECTION, SOLUTION INTRAVENOUS at 09:03

## 2024-03-16 RX ADMIN — POTASSIUM CHLORIDE 40 MEQ: 1500 TABLET, EXTENDED RELEASE ORAL at 08:03

## 2024-03-16 RX ADMIN — WARFARIN SODIUM 5 MG: 5 TABLET ORAL at 05:03

## 2024-03-16 RX ADMIN — ALLOPURINOL 300 MG: 300 TABLET ORAL at 09:03

## 2024-03-16 RX ADMIN — DEXTROSE MONOHYDRATE 250 MG: 50 INJECTION, SOLUTION INTRAVENOUS at 12:03

## 2024-03-16 RX ADMIN — FLUTICASONE FUROATE AND VILANTEROL TRIFENATATE 1 PUFF: 100; 25 POWDER RESPIRATORY (INHALATION) at 03:03

## 2024-03-16 RX ADMIN — PANTOPRAZOLE SODIUM 40 MG: 40 TABLET, DELAYED RELEASE ORAL at 09:03

## 2024-03-16 NOTE — ED NOTES
According to Carlos in the war room rate and rhythm are 102 and sinus tachycardia on telemetry box 1897.

## 2024-03-16 NOTE — H&P
[facility] - Emergency Dept  Hospital Medicine  History & Physical    Patient Name: Yong Mcintyre  MRN: [id number]  Patient Class: IP- Inpatient  Admission Date: 3/15/2024  Attending Physician: Augie Somers III*   Primary Care Provider: Gianni Escalona MD         Patient information was obtained from patient and ER records.     Subjective:     Principal Problem:<principal problem not specified>    Chief Complaint:   Chief Complaint   Patient presents with    Shortness of Breath     On 3LNC home O2. Denies c/p. Has nausea          HPI: Mr. Mcintyre is a 47 y/o male with a PMH of HFrEF, HTN, pHTN, Chronic hypoxic respiratory failure (baseline oxygen req at home is 3 L), PFO (unsuccessful closure in 2006), AF (on coumadin), Obesity hypoventilation syndrome, Morbid obesity presenting to the ED with abdominal bloating, dyspnea for the last 4 days, with weight gain of 10 lbs this week. His dry weight is 231 and this morning was weighing 246.Satting 82% on his home O2 of 3 L, currently satting 94% on 5-6L. Patient reports he has been having this epigastric bloating and associated belching that has been reoccurring. Patient reports that he visited his PCP, and suspected GERD and prescribed him Protonix with little improvement. Patient denies fever, N/V/D, no changes in BM, no increasing passing of gas, no chills, dysuria, flank pain, does endorse orthopnea. Patient does admit to SOB (seems to be chronic but worsens with bloating), belching, and feeling mildly nauseous. Patient has been adherent to medication regimen consisting diuretics and 2 g sodium diet. No new sick contacts. Performs ADL's and tries to works out.       In the ED /64, , RR 18, on 6L oxygen via NC, Labs significant for INR 2.3 (goal 2-3), Na 134, K 2.7, , troponin 0.042, Bun 91, Cr 2.4, PcO2 75.7, HCO3 48.1, he was given 80 lasix in ED with 500cc UOP so far also found to have long Qtc on repeat EKG.     Past Medical History:  "  Diagnosis Date    Arthritis     CHF (congestive heart failure)     Diastolic dysfunction    Cor pulmonale 11/05/2012    Gallstones     Hypertension     Morbid obesity 11/05/2012    Obesity hypoventilation syndrome     On home oxygen therapy 03/07/2014    MALLIKA (obstructive sleep apnea)     BPAP 16/11 with 3 L at night (continuous O2).    Paroxysmal atrial fibrillation     PFO (patent foramen ovale) 11/05/2012    status post closure    Pickwickian syndrome 11/05/2012    Pulmonary hypertension        Past Surgical History:   Procedure Laterality Date    RIGHT HEART CATHETERIZATION Right 3/22/2022    Procedure: INSERTION, CATHETER, RIGHT HEART;  Surgeon: Tony Martínez MD;  Location: [facility] CATH LAB;  Service: Cardiology;  Laterality: Right;    RIGHT HEART CATHETERIZATION Right 1/31/2024    Procedure: INSERTION, CATHETER, RIGHT HEART;  Surgeon: Sheri Ritter MD;  Location: [facility] CATH LAB;  Service: Cardiology;  Laterality: Right;       Review of patient's allergies indicates:   Allergen Reactions    Lipitor [atorvastatin] Other (See Comments)     "it makes my legs feel like jelly"  Other reaction(s): causes legs to hurt       No current facility-administered medications on file prior to encounter.     Current Outpatient Medications on File Prior to Encounter   Medication Sig    acetaminophen (TYLENOL ARTHRITIS ORAL) Take 2 tablets by mouth daily as needed (pain).    allopurinoL (ZYLOPRIM) 300 MG tablet Take 1 tablet (300 mg total) by mouth once daily.    ascorbic acid (VITAMIN C ORAL) Take 1 tablet by mouth once daily.    b complex vitamins tablet Take 1 tablet by mouth once daily.    CALCIUM ORAL Take 1 capsule by mouth once daily.    pantoprazole (PROTONIX) 40 MG tablet Take 1 tablet (40 mg total) by mouth once daily.    torsemide (DEMADEX) 20 MG Tab Take 3 tablets (60 mg total) by mouth 2 (two) times a day.    albuterol (VENTOLIN HFA) 90 mcg/actuation inhaler Inhale 2 puffs into the lungs every 4 (four) hours as " needed for Wheezing. Rescue    multivit,calc,min/FA/K1/lycop (ONE-A-DAY MEN'S COMPLETE ORAL) Take 1 tablet by mouth once daily.    omega-3 fatty acids/fish oil (FISH OIL-OMEGA-3 FATTY ACIDS) 300-1,000 mg capsule Take 1 capsule by mouth once daily.    polyethylene glycol 400 (VISINE DRY EYE RELIEF) 1 % Drop Place 2 drops into both eyes daily as needed (dry eyes).    potassium chloride (MICRO-K) 10 MEQ CpSR Take 10 mEq by mouth 2 (two) times daily.    tiotropium (SPIRIVA) 18 mcg inhalation capsule Inhale 18 mcg into the lungs once daily.    warfarin (COUMADIN) 10 MG tablet Take 1/2 tablet (5 mg) by mouth daily or as directed by [facility] (Patient taking differently: Take 10 mg by mouth. Take 1 table by mouth every Sunday and 1/2 (5 mg)tablet by mouth on all other days.)    WIXELA INHUB 250-50 mcg/dose diskus inhaler 1 puff 2 (two) times daily. USE 1 INHALATION BY MOUTH TWICE DAILY    [DISCONTINUED] sildenafil (REVATIO) 20 mg Tab Take 1 tablet (20 mg total) by mouth 3 (three) times daily.     Family History       Problem Relation (Age of Onset)    Arthritis Mother, Maternal Grandmother, Maternal Grandfather    Asthma Mother, Sister, Maternal Grandmother    Breast cancer Paternal Grandmother    Diabetes Maternal Grandmother    Down syndrome Brother    Gout Brother    Hypertension Father, Maternal Grandmother, Maternal Grandfather, Paternal Grandfather          Tobacco Use    Smoking status: Never    Smokeless tobacco: Never   Substance and Sexual Activity    Alcohol use: Yes     Comment: holidays  rare    Drug use: No    Sexual activity: Not Currently     Partners: Female     Review of Systems   Constitutional:  Positive for activity change, fatigue and unexpected weight change.   Respiratory:  Positive for shortness of breath. Negative for cough.    Cardiovascular:  Positive for leg swelling. Negative for chest pain.   Gastrointestinal:  Positive for abdominal pain and nausea. Negative for constipation, diarrhea  and vomiting.   Genitourinary:  Negative for frequency and urgency.   Musculoskeletal:  Negative for arthralgias and myalgias.   Neurological:  Positive for weakness. Negative for syncope, light-headedness and headaches.   Psychiatric/Behavioral:  Negative for agitation, confusion and decreased concentration.      Objective:     Vital Signs (Most Recent):  Temp: 97.7 °F (36.5 °C) (03/15/24 1128)  Pulse: 101 (03/15/24 1600)  Resp: 18 (03/15/24 1600)  BP: 106/64 (03/15/24 1600)  SpO2: (!) 93 % (03/15/24 1600) Vital Signs (24h Range):  Temp:  [97.7 °F (36.5 °C)] 97.7 °F (36.5 °C)  Pulse:  [101-107] 101  Resp:  [18-22] 18  SpO2:  [86 %-94 %] 93 %  BP: ()/(58-67) 106/64     Weight: 111.6 kg (246 lb)  Body mass index is 40.94 kg/m².     Physical Exam  Constitutional:       Appearance: He is obese.      Comments: Respiratory distress   Cardiovascular:      Rate and Rhythm: Normal rate. Rhythm irregular.      Pulses: Normal pulses.      Heart sounds: Normal heart sounds.   Pulmonary:      Effort: Pulmonary effort is normal.      Breath sounds: Rhonchi present.   Abdominal:      General: Bowel sounds are normal.      Palpations: Abdomen is soft.   Musculoskeletal:      Right lower leg: Edema present.      Left lower leg: Edema present.   Skin:     Findings: Bruising and rash present.      Comments: Lower extremities have hyperpigmentation and varicose veins   Neurological:      General: No focal deficit present.      Mental Status: He is alert and oriented to person, place, and time.   Psychiatric:         Mood and Affect: Mood normal.         Behavior: Behavior normal.         Thought Content: Thought content normal.                Significant Labs: All pertinent labs within the past 24 hours have been reviewed.    Significant Imaging: I have reviewed all pertinent imaging results/findings within the past 24 hours.    Assessment/Plan:     Gout  Continue home allopurinol      Atrial fibrillation  Patient with  Paroxysmal (<7 days) atrial fibrillation which is controlled currently.    . Patient is currently in sinus rhythm.[id number] Score: The patient doesn't have any registry metric data available.. Anticoagulation indicated. Anticoagulation done with warfarin  .  -Daily INR     GERD (gastroesophageal reflux disease)  Continue pantoprazole       Acute on chronic heart failure with preserved ejection fraction (HFpEF)  Patient is identified as having Systolic (HFrEF) heart failure that is Acute on chronic. CHF is currently uncontrolled due to Continued edema of extremities and Weight gain of 15 pounds. Latest ECHO performed and demonstrates- Results for orders placed during the hospital encounter of 01/18/24    Echo    Interpretation Summary    Left Ventricle: The left ventricle is normal in size. Normal wall thickness. Normal wall motion. Septal flattening in systole consistent with right ventricular pressure overload. There is normal systolic function. Ejection fraction by visual approximation is 55%. There is normal diastolic function.    Right Ventricle: Severe right ventricular enlargement. Wall thickness is normal. Right ventricle wall motion has global hypokinesis. Systolic function is severely reduced.    Tricuspid Valve: There is moderate to severe regurgitation.    Pulmonary Artery: There is moderate to severe pulmonary hypertension. The estimated pulmonary artery systolic pressure is 67 mmHg.    IVC/SVC: Intermediate venous pressure at 8 mmHg.    Pericardium: There is a trivial effusion.  . Continue TORSEMIDE 60 mg BID and I/V lasix drip and monitor clinical status closely. Monitor on telemetry. Patient is on CHF pathway.  Monitor strict Is&Os and daily weights.  Place on fluid restriction of 1.5 L. Cardiology has not been consulted. Continue to stress to patient importance of self efficacy and  on diet for CHF. Last BNP reviewed- and noted below   Recent Labs   Lab 03/15/24  1207   *         MALLIKA  (obstructive sleep apnea)  Patient has a history of MALLIKA and wears Bipap at home. Will continue Bipap qhs.      Pulmonary hypertension  Patient has a history of pulmonary hypertension with severe right ventricular enlargement. Wall thickness is normal. Right ventricle wall motion has global hypokinesis. Systolic function is severely reduced. Metoprolol was held because of pulmonary hypertension.        VTE Risk Mitigation (From admission, onward)           Ordered     warfarin (COUMADIN) tablet 5 mg  Daily         03/15/24 1700     IP VTE HIGH RISK PATIENT  Once         03/15/24 1700     Place sequential compression device  Until discontinued         03/15/24 1700                                    Montserrat Pollock MD  Department of Hospital Medicine  [facility] - Emergency Dept

## 2024-03-16 NOTE — PLAN OF CARE
Problem: Adult Inpatient Plan of Care  Goal: Plan of Care Review  Outcome: Ongoing, Progressing     Problem: Fluid and Electrolyte Imbalance (Acute Kidney Injury/Impairment)  Goal: Fluid and Electrolyte Balance  Outcome: Ongoing, Progressing     Problem: Oral Intake Inadequate (Acute Kidney Injury/Impairment)  Goal: Optimal Nutrition Intake  Outcome: Ongoing, Progressing     Problem: Impaired Wound Healing  Goal: Optimal Wound Healing  Outcome: Ongoing, Progressing     Plan of care reviewed with pt. Pt aaox4. Lasix drip continued as ordered. Pt has good urinary output. 1 L fluid restriction enforced. Pt   5L NC now. Pt is still having critial Co2 levels. VSS.  Pt remained free of falls, trauma, and injury.No other complains at this time.

## 2024-03-16 NOTE — PLAN OF CARE
[facility] - [facility]iology Stepdown  Initial Discharge Assessment       Primary Care Provider: Gianni Escalona MD    Admission Diagnosis: Shortness of breath [R06.02]  Dyspnea [R06.00]  Chest pain [R07.9]    Admission Date: 3/15/2024  Expected Discharge Date:     Transition of Care Barriers: (P) None    Payor: HU[employer] MANAGED MEDICARE / Plan: HU[employer]NA MEDICARE SELECT PARTNER / Product Type: Medicare Advantage /     Extended Emergency Contact Information  Primary Emergency Contact: Alphonse Mcintyre  Address: [address] [place] [address] [facility] [place]  Mobile Phone: [phone]  Relation: Father  Secondary Emergency Contact: Bernardino Mcintyre  Address: [address] [place]  Mobile Phone: [phone]  Relation: Mother    Discharge Plan A: (P) Home  Discharge Plan B: (P) Home with family      [id number] [place], [place] - [address] [place] [address] AT [facility] & [address] [address]  Phone: [phone] Fax: [phone]    [facility] [place], [place] - [address]  [address] [address]  Phone: [phone] Fax: [phone]    [facility]  [address] [facility] [address]  Phone: [phone] Fax: [phone]    [facility]  [address] [address]  Phone: [phone] Fax: [phone]      Initial Assessment (most recent)       Adult Discharge Assessment - 03/16/24 1657          Discharge Assessment    Assessment Type Discharge Planning Assessment     Confirmed/corrected address, phone number and insurance Yes     Confirmed Demographics Correct on Facesheet     Source of Information patient     When was your last doctors appointment? -- (P)    3-4 weeks ago.    Communicated ESTEBAN with patient/caregiver Date not available/Unable to determine (P)      Reason For Admission Heart Failure  (P)      People in Home alone (P)      Do you expect to return to your current living situation? Yes (P)      Do you have help at home or someone to help you manage your care at home? Yes (P)      Who are your caregiver(s) and their phone number(s)? Bernardino Gomez [phone] (P)      Prior to hospitilization cognitive status: Alert/Oriented (P)      Current cognitive status: Alert/Oriented (P)      Walking or Climbing Stairs Difficulty no (P)      Dressing/Bathing Difficulty no (P)      Home Accessibility wheelchair accessible (P)      Equipment Currently Used at Home oxygen;BIPAP;CPAP (P)      Readmission within 30 days? No (P)      Patient currently being followed by outpatient case management? No (P)      Do you currently have service(s) that help you manage your care at home? No (P)      Do you take prescription medications? Yes (P)      Do you have prescription coverage? Yes (P)      Coverage [employer] Medicare (P)      Do you have any problems affording any of your prescribed medications? No (P)      Is the patient taking medications as prescribed? yes (P)      Who is going to help you get home at discharge? Family (P)      How do you get to doctors appointments? car, drives self (P)      Are you on dialysis? No (P)      Do you take coumadin? Yes (P)      Who monitors your labs? [facility] (P)      Discharge Plan A Home (P)      Discharge Plan B Home with family (P)      DME Needed Upon Discharge  rollator (P)      Discharge Plan discussed with: Patient (P)      Transition of Care Barriers None (P)         Physical Activity    On average, how many days per week do you engage in moderate to strenuous exercise (like a brisk walk)? 0 days (P)      On average, how many minutes do you engage in exercise at this level? 0 min (P)         Financial Resource Strain    How hard is it for you to pay for the very basics like food, housing, medical care, and heating? Not very hard (P)          Housing Stability    In the last 12 months, was there a time when you were not able to pay the mortgage or rent on time? No (P)      In the last 12 months, how many places have you lived? 1 (P)      In the last 12 months, was there a time when you did not have a steady place to sleep or slept in a shelter (including now)? No (P)         Transportation Needs    In the past 12 months, has lack of transportation kept you from medical appointments or from getting medications? No (P)      In the past 12 months, has lack of transportation kept you from meetings, work, or from getting things needed for daily living? No (P)         Food Insecurity    Within the past 12 months, you worried that your food would run out before you got the money to buy more. Never true (P)      Within the past 12 months, the food you bought just didn't last and you didn't have money to get more. Never true (P)         Stress    Do you feel stress - tense, restless, nervous, or anxious, or unable to sleep at night because your mind is troubled all the time - these days? Not at all (P)         Social Connections    In a typical week, how many times do you talk on the phone with family, friends, or neighbors? More than three times a week (P)      How often do you get together with friends or relatives? More than three times a week (P)      How often do you attend Mu-ism or Buddhist services? Never (P)      Do you belong to any clubs or organizations such as Mu-ism groups, unions, fraternal or athletic groups, or school groups? No (P)      How often do you attend meetings of the clubs or organizations you belong to? Never (P)      Are you , , , , never , or living with a partner? Never  (P)         Alcohol Use    Q1: How often do you have a drink containing alcohol? Monthly or less (P)      Q2: How many drinks containing alcohol do you have on a typical day when you are drinking? 1 or 2 (P)      Q3:  How often do you have six or more drinks on one occasion? Never (P)                       SW met with pt at bedside to complete Initial Discharge Planning Assessment. Pt. is single, never- and lives alone in his home in [place], [place]. The home is a single story home with 4 steps to enter. DME: C-Pap; Bi-Pap & Oxygen. Pt. Is on Coumadin and has weekly visits at the [facility]. Pt. drives and plans on driving himself home upon discharge as his automobile is in the parking lot of the hospital. Good family support.     Discharge Plan A and Plan B have been determined by review of patient's clinical status, future medical and therapeutic needs, and coverage/benefits for post-acute care in coordination with multidisciplinary team members.     Gordon Echavarria LMSW

## 2024-03-16 NOTE — ASSESSMENT & PLAN NOTE
Patient with Paroxysmal (<7 days) atrial fibrillation which is controlled currently.    . Patient is currently in sinus rhythm.[id number] Score: The patient doesn't have any registry metric data available.. Anticoagulation indicated. Anticoagulation done with warfarin  .  -Daily INR

## 2024-03-16 NOTE — PROGRESS NOTES
[facility] - Cardiology [facility]  Progress Note    Patient Name: Yong Mcintyre  MRN: [id number]  Patient Class: IP- Inpatient   Admission Date: 3/15/2024  Length of Stay: 1 days  Attending Physician: Augie Somers III*  Primary Care Provider: Gianni Escalona MD        Subjective:     Principal Problem:Acute on chronic heart failure with preserved ejection fraction (HFpEF)        HPI:  Mr. Mcintyre is a 47 y/o male with a PMH of HFrEF, HTN, pHTN, Chronic hypoxic respiratory failure (baseline oxygen req at home is 3 L), PFO (unsuccessful closure in 2006), AF (on coumadin), Obesity hypoventilation syndrome, Morbid obesity presenting to the ED with abdominal bloating, dyspnea for the last 4 days, with weight gain of 10 lbs this week. His dry weight is 231 and this morning was weighing 246.Satting 82% on his home O2 of 3 L, currently satting 94% on 5-6L. Patient reports he has been having this epigastric bloating and associated belching that has been reoccurring. Patient reports that he visited his PCP, and suspected GERD and prescribed him Protonix with little improvement. Patient denies fever, N/V/D, no changes in BM, no increasing passing of gas, no chills, dysuria, flank pain, does endorse orthopnea. Patient does admit to SOB (seems to be chronic but worsens with bloating), belching, and feeling mildly nauseous. Patient has been adherent to medication regimen consisting diuretics and 2 g sodium diet. No new sick contacts. Performs ADL's and tries to works out.       In the ED /64, , RR 18, on 6L oxygen via [place], Labs significant for INR 2.3 (goal 2-3), Na 134, K 2.7, , troponin 0.042, Bun 91, Cr 2.4, PcO2 75.7, HCO3 48.1, he was given 80 lasix in ED with 500cc UOP so far also found to have long Qtc on repeat EKG.     Overview/Hospital Course:  No notes on file    Interval History: NAEON, VSS, hypokalemic on labs, k was replaced. INR within goal. Diuresing well on lasix  gtt    Review of Systems   Constitutional:  Positive for activity change, fatigue and unexpected weight change.   Respiratory:  Positive for shortness of breath. Negative for cough.    Cardiovascular:  Positive for leg swelling. Negative for chest pain.   Gastrointestinal:  Negative for constipation, diarrhea and vomiting.   Genitourinary:  Negative for frequency and urgency.   Musculoskeletal:  Negative for arthralgias and myalgias.   Neurological:  Positive for weakness. Negative for syncope, light-headedness and headaches.   Psychiatric/Behavioral:  Negative for agitation, confusion and decreased concentration.  Objective:     Vital Signs (Most Recent):  Temp: 98.5 °F (36.9 °C) (03/16/24 0740)  Pulse: 99 (03/16/24 0743)  Resp: 19 (03/16/24 0743)  BP: (!) 114/58 (03/16/24 0740)  SpO2: (!) 92 % (03/16/24 0743) Vital Signs (24h Range):  Temp:  [97.5 °F (36.4 °C)-98.5 °F (36.9 °C)] 98.5 °F (36.9 °C)  Pulse:  [] 99  Resp:  [16-25] 19  SpO2:  [86 %-94 %] 92 %  BP: ()/(58-96) 114/58     Weight: 108.4 kg (238 lb 15.7 oz)  Body mass index is 39.77 kg/m².    Intake/Output Summary (Last 24 hours) at 3/16/2024 0825  Last data filed at 3/16/2024 0756  Gross per 24 hour   Intake 420 ml   Output 2900 ml   Net -2480 ml         Physical Exam      Constitutional:       Appearance: He is obese.      Comments: Respiratory distress   Cardiovascular:      Rate and Rhythm: Normal rate. Rhythm irregular.      Pulses: Normal pulses.      Heart sounds: Normal heart sounds.   Pulmonary:      Effort: Pulmonary effort is normal.      Breath sounds: Normal  Abdominal:      General: Bowel sounds are normal.      Palpations: Abdomen is soft.   Musculoskeletal:      Right lower leg: Edema present.      Left lower leg: Edema present.   Skin:     Findings: Bruising and rash present.      Comments: Lower extremities have hyperpigmentation and varicose veins   Neurological:      General: No focal deficit present.      Mental Status: He  is alert and oriented to person, place, and time.   Psychiatric:         Mood and Affect: Mood normal.         Behavior: Behavior normal.         Thought Content: Thought content normal.   Significant Labs: All pertinent labs within the past 24 hours have been reviewed.    Significant Imaging: I have reviewed all pertinent imaging results/findings within the past 24 hours.    Assessment/Plan:      * Acute on chronic heart failure with preserved ejection fraction (HFpEF)  Patient is identified as having Systolic (HFrEF) heart failure that is Acute on chronic. CHF is currently uncontrolled due to Continued edema of extremities and Weight gain of 15 pounds. Latest ECHO performed and demonstrates- Results for orders placed during the hospital encounter of 01/18/24    Echo    Interpretation Summary    Left Ventricle: The left ventricle is normal in size. Normal wall thickness. Normal wall motion. Septal flattening in systole consistent with right ventricular pressure overload. There is normal systolic function. Ejection fraction by visual approximation is 55%. There is normal diastolic function.    Right Ventricle: Severe right ventricular enlargement. Wall thickness is normal. Right ventricle wall motion has global hypokinesis. Systolic function is severely reduced.    Tricuspid Valve: There is moderate to severe regurgitation.    Pulmonary Artery: There is moderate to severe pulmonary hypertension. The estimated pulmonary artery systolic pressure is 67 mmHg.    IVC/SVC: Intermediate venous pressure at 8 mmHg.    Pericardium: There is a trivial effusion.  . Continue I/V lasix drip and monitor clinical status closely. Monitor on telemetry. Patient is on CHF pathway.  Monitor strict Is&Os and daily weights.  Place on fluid restriction of 1.5 L. Cardiology has not been consulted. Continue to stress to patient importance of self efficacy and  on diet for CHF. Last BNP reviewed- and noted below   Recent Labs   Lab  03/15/24  1207   *     -Start Breo inhalaler        Gout  Continue home allopurinol      Atrial fibrillation  Patient with Paroxysmal (<7 days) atrial fibrillation which is controlled currently.    . Patient is currently in sinus rhythm.[id number] Score: The patient doesn't have any registry metric data available.. Anticoagulation indicated. Anticoagulation done with warfarin  .  -Daily INR     GERD (gastroesophageal reflux disease)  Continue pantoprazole       MALLIKA (obstructive sleep apnea)  Patient has a history of MALLIKA and wears Bipap at home. Will continue Bipap qhs.      Pulmonary hypertension  Patient has a history of pulmonary hypertension with severe right ventricular enlargement. Wall thickness is normal. Right ventricle wall motion has global hypokinesis. Systolic function is severely reduced. Metoprolol was held because of pulmonary hypertension.        VTE Risk Mitigation (From admission, onward)           Ordered     warfarin (COUMADIN) tablet 5 mg  Daily         03/16/24 0945     IP VTE HIGH RISK PATIENT  Once         03/15/24 1700     Place sequential compression device  Until discontinued         03/15/24 1700                    Discharge Planning   ESTEBAN:      Code Status: Full Code   Is the patient medically ready for discharge?:     Reason for patient still in hospital (select all that apply): Treatment                     Montserrat Pollock MD  Department of Hospital Medicine   [facility] - Cardiology Stepdown

## 2024-03-16 NOTE — NURSING
Admitted to room 346, alert and oriented x4, denies pain, no s/s of distress, oriented to room and call light, safety instructions given, verbalized understanding, skin intact, comfort measures given, call light within reach, will continue to monitor.

## 2024-03-16 NOTE — ASSESSMENT & PLAN NOTE
Patient is identified as having Systolic (HFrEF) heart failure that is Acute on chronic. CHF is currently uncontrolled due to Continued edema of extremities and Weight gain of 15 pounds. Latest ECHO performed and demonstrates- Results for orders placed during the hospital encounter of 01/18/24    Echo    Interpretation Summary    Left Ventricle: The left ventricle is normal in size. Normal wall thickness. Normal wall motion. Septal flattening in systole consistent with right ventricular pressure overload. There is normal systolic function. Ejection fraction by visual approximation is 55%. There is normal diastolic function.    Right Ventricle: Severe right ventricular enlargement. Wall thickness is normal. Right ventricle wall motion has global hypokinesis. Systolic function is severely reduced.    Tricuspid Valve: There is moderate to severe regurgitation.    Pulmonary Artery: There is moderate to severe pulmonary hypertension. The estimated pulmonary artery systolic pressure is 67 mmHg.    IVC/SVC: Intermediate venous pressure at 8 mmHg.    Pericardium: There is a trivial effusion.  . Continue I/V lasix drip and monitor clinical status closely. Monitor on telemetry. Patient is on CHF pathway.  Monitor strict Is&Os and daily weights.  Place on fluid restriction of 1.5 L. Cardiology has not been consulted. Continue to stress to patient importance of self efficacy and  on diet for CHF. Last BNP reviewed- and noted below   Recent Labs   Lab 03/15/24  1207   *

## 2024-03-16 NOTE — ASSESSMENT & PLAN NOTE
Patient is identified as having Systolic (HFrEF) heart failure that is Acute on chronic. CHF is currently uncontrolled due to Continued edema of extremities and Weight gain of 15 pounds. Latest ECHO performed and demonstrates- Results for orders placed during the hospital encounter of 01/18/24    Echo    Interpretation Summary    Left Ventricle: The left ventricle is normal in size. Normal wall thickness. Normal wall motion. Septal flattening in systole consistent with right ventricular pressure overload. There is normal systolic function. Ejection fraction by visual approximation is 55%. There is normal diastolic function.    Right Ventricle: Severe right ventricular enlargement. Wall thickness is normal. Right ventricle wall motion has global hypokinesis. Systolic function is severely reduced.    Tricuspid Valve: There is moderate to severe regurgitation.    Pulmonary Artery: There is moderate to severe pulmonary hypertension. The estimated pulmonary artery systolic pressure is 67 mmHg.    IVC/SVC: Intermediate venous pressure at 8 mmHg.    Pericardium: There is a trivial effusion.  . Continue TORSEMIDE 60 mg BID and I/V lasix drip and monitor clinical status closely. Monitor on telemetry. Patient is on CHF pathway.  Monitor strict Is&Os and daily weights.  Place on fluid restriction of 1.5 L. Cardiology has not been consulted. Continue to stress to patient importance of self efficacy and  on diet for CHF. Last BNP reviewed- and noted below   Recent Labs   Lab 03/15/24  1207   *

## 2024-03-16 NOTE — SUBJECTIVE & OBJECTIVE
Interval History: NAEON, VSS, hypokalemic on labs, k was replaced. INR within goal. Diuresing well on lasix gtt    Review of Systems   Constitutional:  Positive for activity change, fatigue and unexpected weight change.   Respiratory:  Positive for shortness of breath. Negative for cough.    Cardiovascular:  Positive for leg swelling. Negative for chest pain.   Gastrointestinal:  Negative for constipation, diarrhea and vomiting.   Genitourinary:  Negative for frequency and urgency.   Musculoskeletal:  Negative for arthralgias and myalgias.   Neurological:  Positive for weakness. Negative for syncope, light-headedness and headaches.   Psychiatric/Behavioral:  Negative for agitation, confusion and decreased concentration.  Objective:     Vital Signs (Most Recent):  Temp: 98.5 °F (36.9 °C) (03/16/24 0740)  Pulse: 99 (03/16/24 0743)  Resp: 19 (03/16/24 0743)  BP: (!) 114/58 (03/16/24 0740)  SpO2: (!) 92 % (03/16/24 0743) Vital Signs (24h Range):  Temp:  [97.5 °F (36.4 °C)-98.5 °F (36.9 °C)] 98.5 °F (36.9 °C)  Pulse:  [] 99  Resp:  [16-25] 19  SpO2:  [86 %-94 %] 92 %  BP: ()/(58-96) 114/58     Weight: 108.4 kg (238 lb 15.7 oz)  Body mass index is 39.77 kg/m².    Intake/Output Summary (Last 24 hours) at 3/16/2024 0825  Last data filed at 3/16/2024 0756  Gross per 24 hour   Intake 420 ml   Output 2900 ml   Net -2480 ml         Physical Exam      Constitutional:       Appearance: He is obese.      Comments: Respiratory distress   Cardiovascular:      Rate and Rhythm: Normal rate. Rhythm irregular.      Pulses: Normal pulses.      Heart sounds: Normal heart sounds.   Pulmonary:      Effort: Pulmonary effort is normal.      Breath sounds: Normal  Abdominal:      General: Bowel sounds are normal.      Palpations: Abdomen is soft.   Musculoskeletal:      Right lower leg: Edema present.      Left lower leg: Edema present.   Skin:     Findings: Bruising and rash present.      Comments: Lower extremities have  hyperpigmentation and varicose veins   Neurological:      General: No focal deficit present.      Mental Status: He is alert and oriented to person, place, and time.   Psychiatric:         Mood and Affect: Mood normal.         Behavior: Behavior normal.         Thought Content: Thought content normal.   Significant Labs: All pertinent labs within the past 24 hours have been reviewed.    Significant Imaging: I have reviewed all pertinent imaging results/findings within the past 24 hours.

## 2024-03-17 LAB
ALBUMIN SERPL BCP-MCNC: 2.8 G/DL (ref 3.5–5.2)
ALP SERPL-CCNC: 125 U/L (ref 55–135)
ALT SERPL W/O P-5'-P-CCNC: 14 U/L (ref 10–44)
ANION GAP SERPL CALC-SCNC: 12 MMOL/L (ref 8–16)
ANION GAP SERPL CALC-SCNC: 7 MMOL/L (ref 8–16)
AST SERPL-CCNC: 19 U/L (ref 10–40)
BASOPHILS # BLD AUTO: 0.06 K/UL (ref 0–0.2)
BASOPHILS NFR BLD: 0.7 % (ref 0–1.9)
BILIRUB SERPL-MCNC: 1.7 MG/DL (ref 0.1–1)
BSA FOR ECHO PROCEDURE: 2.24 M2
BUN SERPL-MCNC: 71 MG/DL (ref 6–20)
BUN SERPL-MCNC: 75 MG/DL (ref 6–20)
BUN SERPL-MCNC: 76 MG/DL (ref 6–20)
BUN SERPL-MCNC: 78 MG/DL (ref 6–20)
CALCIUM SERPL-MCNC: 9.6 MG/DL (ref 8.7–10.5)
CALCIUM SERPL-MCNC: 9.8 MG/DL (ref 8.7–10.5)
CHLORIDE SERPL-SCNC: 87 MMOL/L (ref 95–110)
CHLORIDE SERPL-SCNC: 88 MMOL/L (ref 95–110)
CO2 SERPL-SCNC: 40 MMOL/L (ref 23–29)
CO2 SERPL-SCNC: 41 MMOL/L (ref 23–29)
CO2 SERPL-SCNC: 41 MMOL/L (ref 23–29)
CO2 SERPL-SCNC: 45 MMOL/L (ref 23–29)
CREAT SERPL-MCNC: 1.9 MG/DL (ref 0.5–1.4)
CREAT SERPL-MCNC: 1.9 MG/DL (ref 0.5–1.4)
CREAT SERPL-MCNC: 2 MG/DL (ref 0.5–1.4)
CREAT SERPL-MCNC: 2.2 MG/DL (ref 0.5–1.4)
CV ECHO LV RWT: 0.34 CM
DIFFERENTIAL METHOD BLD: ABNORMAL
ECHO LV POSTERIOR WALL: 0.86 CM (ref 0.6–1.1)
EJECTION FRACTION: 60 %
EOSINOPHIL # BLD AUTO: 0 K/UL (ref 0–0.5)
EOSINOPHIL NFR BLD: 0 % (ref 0–8)
ERYTHROCYTE [DISTWIDTH] IN BLOOD BY AUTOMATED COUNT: 21 % (ref 11.5–14.5)
EST. GFR  (NO RACE VARIABLE): 36 ML/MIN/1.73 M^2
EST. GFR  (NO RACE VARIABLE): 40.4 ML/MIN/1.73 M^2
EST. GFR  (NO RACE VARIABLE): 43 ML/MIN/1.73 M^2
EST. GFR  (NO RACE VARIABLE): 43 ML/MIN/1.73 M^2
FRACTIONAL SHORTENING: 27 % (ref 28–44)
GLUCOSE SERPL-MCNC: 100 MG/DL (ref 70–110)
GLUCOSE SERPL-MCNC: 101 MG/DL (ref 70–110)
GLUCOSE SERPL-MCNC: 103 MG/DL (ref 70–110)
GLUCOSE SERPL-MCNC: 152 MG/DL (ref 70–110)
HCT VFR BLD AUTO: 53.4 % (ref 40–54)
HGB BLD-MCNC: 15.2 G/DL (ref 14–18)
IMM GRANULOCYTES # BLD AUTO: 0.01 K/UL (ref 0–0.04)
IMM GRANULOCYTES NFR BLD AUTO: 0.1 % (ref 0–0.5)
INR PPP: 2.5 (ref 0.8–1.2)
INTERVENTRICULAR SEPTUM: 0.92 CM (ref 0.6–1.1)
LEFT INTERNAL DIMENSION IN SYSTOLE: 3.68 CM (ref 2.1–4)
LEFT VENTRICLE DIASTOLIC VOLUME INDEX: 57.03 ML/M2
LEFT VENTRICLE DIASTOLIC VOLUME: 122.04 ML
LEFT VENTRICLE MASS INDEX: 75 G/M2
LEFT VENTRICLE SYSTOLIC VOLUME INDEX: 26.7 ML/M2
LEFT VENTRICLE SYSTOLIC VOLUME: 57.22 ML
LEFT VENTRICULAR INTERNAL DIMENSION IN DIASTOLE: 5.07 CM (ref 3.5–6)
LEFT VENTRICULAR MASS: 159.59 G
LYMPHOCYTES # BLD AUTO: 1.2 K/UL (ref 1–4.8)
LYMPHOCYTES NFR BLD: 15.2 % (ref 18–48)
MAGNESIUM SERPL-MCNC: 1.8 MG/DL (ref 1.6–2.6)
MAGNESIUM SERPL-MCNC: 1.9 MG/DL (ref 1.6–2.6)
MAGNESIUM SERPL-MCNC: 2 MG/DL (ref 1.6–2.6)
MAGNESIUM SERPL-MCNC: 2.4 MG/DL (ref 1.6–2.6)
MCH RBC QN AUTO: 25.7 PG (ref 27–31)
MCHC RBC AUTO-ENTMCNC: 28.5 G/DL (ref 32–36)
MCV RBC AUTO: 90 FL (ref 82–98)
MONOCYTES # BLD AUTO: 0.7 K/UL (ref 0.3–1)
MONOCYTES NFR BLD: 8.2 % (ref 4–15)
NEUTROPHILS # BLD AUTO: 6.1 K/UL (ref 1.8–7.7)
NEUTROPHILS NFR BLD: 75.8 % (ref 38–73)
NRBC BLD-RTO: 0 /100 WBC
PHOSPHATE SERPL-MCNC: 4.3 MG/DL (ref 2.7–4.5)
PISA TR MAX VEL: 4.26 M/S
PLATELET # BLD AUTO: 223 K/UL (ref 150–450)
PMV BLD AUTO: 10.3 FL (ref 9.2–12.9)
POTASSIUM SERPL-SCNC: 3.6 MMOL/L (ref 3.5–5.1)
POTASSIUM SERPL-SCNC: 3.7 MMOL/L (ref 3.5–5.1)
POTASSIUM SERPL-SCNC: 3.7 MMOL/L (ref 3.5–5.1)
POTASSIUM SERPL-SCNC: 3.9 MMOL/L (ref 3.5–5.1)
PROT SERPL-MCNC: 7.5 G/DL (ref 6–8.4)
PROTHROMBIN TIME: 25.7 SEC (ref 9–12.5)
RA PRESSURE ESTIMATED: 15 MMHG
RBC # BLD AUTO: 5.91 M/UL (ref 4.6–6.2)
RIGHT VENTRICLE GLOBAL SYSTOLIC STRAIN: 7 %
RIGHT VENTRICULAR END-DIASTOLIC DIMENSION: 6 CM
RV FRACTIONAL AREA CHANGE: 5 %
RV TB RVSP: 19 MMHG
SODIUM SERPL-SCNC: 140 MMOL/L (ref 136–145)
SODIUM SERPL-SCNC: 141 MMOL/L (ref 136–145)
TR MAX PG: 73 MMHG
TRICUSPID ANNULAR PLANE SYSTOLIC EXCURSION: 1.5 CM
TV REST PULMONARY ARTERY PRESSURE: 88 MMHG
WBC # BLD AUTO: 8.05 K/UL (ref 3.9–12.7)
Z-SCORE OF LEFT VENTRICULAR DIMENSION IN END DIASTOLE: -3.06
Z-SCORE OF LEFT VENTRICULAR DIMENSION IN END SYSTOLE: -1.04

## 2024-03-17 PROCEDURE — 94660 CPAP INITIATION&MGMT: CPT | Mod: HCNC

## 2024-03-17 PROCEDURE — 25000003 PHARM REV CODE 250

## 2024-03-17 PROCEDURE — 25000003 PHARM REV CODE 250: Mod: HCNC

## 2024-03-17 PROCEDURE — 63600175 PHARM REV CODE 636 W HCPCS

## 2024-03-17 PROCEDURE — 36415 COLL VENOUS BLD VENIPUNCTURE: CPT

## 2024-03-17 PROCEDURE — 84100 ASSAY OF PHOSPHORUS: CPT | Mod: HCNC

## 2024-03-17 PROCEDURE — 85025 COMPLETE CBC W/AUTO DIFF WBC: CPT | Mod: HCNC

## 2024-03-17 PROCEDURE — 27100171 HC OXYGEN HIGH FLOW UP TO 24 HOURS: Mod: HCNC

## 2024-03-17 PROCEDURE — 80048 BASIC METABOLIC PNL TOTAL CA: CPT | Mod: 91,XB

## 2024-03-17 PROCEDURE — 94761 N-INVAS EAR/PLS OXIMETRY MLT: CPT | Mod: HCNC

## 2024-03-17 PROCEDURE — 83735 ASSAY OF MAGNESIUM: CPT | Mod: 91

## 2024-03-17 PROCEDURE — 94640 AIRWAY INHALATION TREATMENT: CPT

## 2024-03-17 PROCEDURE — 85610 PROTHROMBIN TIME: CPT | Mod: HCNC

## 2024-03-17 PROCEDURE — 83735 ASSAY OF MAGNESIUM: CPT | Mod: HCNC

## 2024-03-17 PROCEDURE — 99900035 HC TECH TIME PER 15 MIN (STAT)

## 2024-03-17 PROCEDURE — 94640 AIRWAY INHALATION TREATMENT: CPT | Mod: HCNC

## 2024-03-17 PROCEDURE — 20600001 HC STEP DOWN PRIVATE ROOM

## 2024-03-17 PROCEDURE — 25000242 PHARM REV CODE 250 ALT 637 W/ HCPCS

## 2024-03-17 PROCEDURE — 80053 COMPREHEN METABOLIC PANEL: CPT | Mod: HCNC

## 2024-03-17 RX ORDER — IPRATROPIUM BROMIDE AND ALBUTEROL SULFATE 2.5; .5 MG/3ML; MG/3ML
3 SOLUTION RESPIRATORY (INHALATION) EVERY 6 HOURS PRN
Status: DISCONTINUED | OUTPATIENT
Start: 2024-03-17 | End: 2024-03-17

## 2024-03-17 RX ORDER — POTASSIUM CHLORIDE 20 MEQ/1
40 TABLET, EXTENDED RELEASE ORAL
Status: COMPLETED | OUTPATIENT
Start: 2024-03-17 | End: 2024-03-17

## 2024-03-17 RX ORDER — MAGNESIUM SULFATE HEPTAHYDRATE 40 MG/ML
2 INJECTION, SOLUTION INTRAVENOUS ONCE
Status: COMPLETED | OUTPATIENT
Start: 2024-03-17 | End: 2024-03-17

## 2024-03-17 RX ORDER — IPRATROPIUM BROMIDE AND ALBUTEROL SULFATE 2.5; .5 MG/3ML; MG/3ML
3 SOLUTION RESPIRATORY (INHALATION) EVERY 8 HOURS
Status: DISCONTINUED | OUTPATIENT
Start: 2024-03-17 | End: 2024-03-21 | Stop reason: HOSPADM

## 2024-03-17 RX ORDER — POTASSIUM CHLORIDE 20 MEQ/1
40 TABLET, EXTENDED RELEASE ORAL ONCE
Status: COMPLETED | OUTPATIENT
Start: 2024-03-17 | End: 2024-03-17

## 2024-03-17 RX ADMIN — POTASSIUM CHLORIDE 40 MEQ: 1500 TABLET, EXTENDED RELEASE ORAL at 02:03

## 2024-03-17 RX ADMIN — FLUTICASONE FUROATE AND VILANTEROL TRIFENATATE 1 PUFF: 100; 25 POWDER RESPIRATORY (INHALATION) at 08:03

## 2024-03-17 RX ADMIN — PANTOPRAZOLE SODIUM 40 MG: 40 TABLET, DELAYED RELEASE ORAL at 08:03

## 2024-03-17 RX ADMIN — IPRATROPIUM BROMIDE AND ALBUTEROL SULFATE 3 ML: .5; 3 SOLUTION RESPIRATORY (INHALATION) at 03:03

## 2024-03-17 RX ADMIN — IPRATROPIUM BROMIDE AND ALBUTEROL SULFATE 3 ML: .5; 3 SOLUTION RESPIRATORY (INHALATION) at 11:03

## 2024-03-17 RX ADMIN — MAGNESIUM SULFATE HEPTAHYDRATE 2 G: 40 INJECTION, SOLUTION INTRAVENOUS at 03:03

## 2024-03-17 RX ADMIN — POTASSIUM CHLORIDE 40 MEQ: 1500 TABLET, EXTENDED RELEASE ORAL at 08:03

## 2024-03-17 RX ADMIN — WARFARIN SODIUM 5 MG: 5 TABLET ORAL at 09:03

## 2024-03-17 RX ADMIN — ALLOPURINOL 300 MG: 300 TABLET ORAL at 08:03

## 2024-03-17 RX ADMIN — ACETAZOLAMIDE SODIUM 500 MG: 500 INJECTION, POWDER, LYOPHILIZED, FOR SOLUTION INTRAVENOUS at 05:03

## 2024-03-17 RX ADMIN — POTASSIUM CHLORIDE 40 MEQ: 1500 TABLET, EXTENDED RELEASE ORAL at 04:03

## 2024-03-17 RX ADMIN — SODIUM CHLORIDE 15 MG/HR: 9 INJECTION, SOLUTION INTRAVENOUS at 11:03

## 2024-03-17 NOTE — CONSULTS
"Food & Nutrition  Education     Diet Education: Heart Failure  Time Spent: 5 minutes  Learners: Patient     Nutrition Education provided with handouts: Heart Failure Nutrition Therapy     Comments: Unable to speak to patient at bedside, nor provide nutrition handout. Per chart review, patient provided heart failure edu on 1/19/24.      Follow-Up: Yes, plan for tomorrow     Please Re-consult as needed     Thanks!     Ana De La Cruz MS (Gabby), RD, LDN    "

## 2024-03-17 NOTE — ASSESSMENT & PLAN NOTE
Patient with Paroxysmal (<7 days) atrial fibrillation which is controlled currently.    . Patient is currently in sinus rhythm.[id number] Score: The patient doesn't have any registry metric data available.. Anticoagulation indicated. Anticoagulation done with warfarin  .  -Daily INR

## 2024-03-17 NOTE — PLAN OF CARE
Problem: Adult Inpatient Plan of Care  Goal: Plan of Care Review  Outcome: Ongoing, Progressing  Flowsheets (Taken 3/17/2024 3006)  Plan of Care Reviewed With: patient  Goal: Patient-Specific Goal (Individualized)  Outcome: Ongoing, Progressing  Goal: Absence of Hospital-Acquired Illness or Injury  Outcome: Ongoing, Progressing  Goal: Optimal Comfort and Wellbeing  Outcome: Ongoing, Progressing  Goal: Readiness for Transition of Care  Outcome: Ongoing, Progressing     Problem: Bariatric Environmental Safety  Goal: Safety Maintained with Care  Outcome: Ongoing, Progressing     Problem: Fluid and Electrolyte Imbalance (Acute Kidney Injury/Impairment)  Goal: Fluid and Electrolyte Balance  Outcome: Ongoing, Progressing     Problem: Oral Intake Inadequate (Acute Kidney Injury/Impairment)  Goal: Optimal Nutrition Intake  Outcome: Ongoing, Progressing

## 2024-03-17 NOTE — ASSESSMENT & PLAN NOTE
Creatine stable for now. BMP reviewed- noted Estimated Creatinine Clearance: 46.8 mL/min (A) (based on SCr of 2.2 mg/dL (H)). according to latest data. Based on current GFR, CKD stage is stage 3 - GFR 30-59.  Monitor UOP and serial BMP and adjust therapy as needed. Renally dose meds. Avoid nephrotoxic medications and procedures.

## 2024-03-17 NOTE — PLAN OF CARE
Problem: Adult Inpatient Plan of Care  Goal: Plan of Care Review  Outcome: Ongoing, Progressing  Goal: Patient-Specific Goal (Individualized)  Outcome: Ongoing, Progressing  Goal: Absence of Hospital-Acquired Illness or Injury  Outcome: Ongoing, Progressing  Goal: Optimal Comfort and Wellbeing  Outcome: Ongoing, Progressing  Goal: Readiness for Transition of Care  Outcome: Ongoing, Progressing     Problem: Bariatric Environmental Safety  Goal: Safety Maintained with Care  Outcome: Ongoing, Progressing     Problem: Fluid and Electrolyte Imbalance (Acute Kidney Injury/Impairment)  Goal: Fluid and Electrolyte Balance  Outcome: Ongoing, Progressing     Problem: Oral Intake Inadequate (Acute Kidney Injury/Impairment)  Goal: Optimal Nutrition Intake  Outcome: Ongoing, Progressing     Problem: Renal Function Impairment (Acute Kidney Injury/Impairment)  Goal: Effective Renal Function  Outcome: Ongoing, Progressing     Problem: Impaired Wound Healing  Goal: Optimal Wound Healing  Outcome: Ongoing, Progressing     Problem: Fall Injury Risk  Goal: Absence of Fall and Fall-Related Injury  Outcome: Ongoing, Progressing

## 2024-03-17 NOTE — PROGRESS NOTES
[facility] - Cardiology [facility] Medicine  Progress Note    Patient Name: Yong Mcintyre  MRN: [id number]  Patient Class: IP- Inpatient   Admission Date: 3/15/2024  Length of Stay: 2 days  Attending Physician: Augie Somers III*  Primary Care Provider: Gianni Escalona MD        Subjective:     Principal Problem:Acute on chronic heart failure with preserved ejection fraction (HFpEF)        HPI:  Mr. Mcintyre is a 49 y/o male with a PMH of HFrEF, HTN, pHTN, Chronic hypoxic respiratory failure (baseline oxygen req at home is 3 L), PFO (unsuccessful closure in 2006), AF (on coumadin), Obesity hypoventilation syndrome, Morbid obesity presenting to the ED with abdominal bloating, dyspnea for the last 4 days, with weight gain of 10 lbs this week. His dry weight is 231 and this morning was weighing 246.Satting 82% on his home O2 of 3 L, currently satting 94% on 5-6L. Patient reports he has been having this epigastric bloating and associated belching that has been reoccurring. Patient reports that he visited his PCP, and suspected GERD and prescribed him Protonix with little improvement. Patient denies fever, N/V/D, no changes in BM, no increasing passing of gas, no chills, dysuria, flank pain, does endorse orthopnea. Patient does admit to SOB (seems to be chronic but worsens with bloating), belching, and feeling mildly nauseous. Patient has been adherent to medication regimen consisting diuretics and 2 g sodium diet. No new sick contacts. Performs ADL's and tries to works out.       In the ED /64, , RR 18, on 6L oxygen via [place], Labs significant for INR 2.3 (goal 2-3), Na 134, K 2.7, , troponin 0.042, Bun 91, Cr 2.4, PcO2 75.7, HCO3 48.1, he was given 80 lasix in ED with 500cc UOP so far also found to have long Qtc on repeat EKG.     Overview/Hospital Course:  No notes on file    Interval History: NAEON, VSS, patient has been diuresing well on lasix drip. Creatinine bumped a little therefore  will decrease the lianna of lasix and also add acetazolamide for contraction alkalosis.          Objective:     Vital Signs (Most Recent):  Temp: 98.3 °F (36.8 °C) (03/17/24 1143)  Pulse: (!) 111 (03/17/24 1443)  Resp: 20 (03/17/24 1143)  BP: (!) 99/58 (03/17/24 1143)  SpO2: (!) 92 % (03/17/24 1143) Vital Signs (24h Range):  Temp:  [97.4 °F (36.3 °C)-98.3 °F (36.8 °C)] 98.3 °F (36.8 °C)  Pulse:  [] 111  Resp:  [18-22] 20  SpO2:  [90 %-98 %] 92 %  BP: ()/(58-68) 99/58     Weight: 109.3 kg (241 lb)  Body mass index is 40.1 kg/m².    Intake/Output Summary (Last 24 hours) at 3/17/2024 1530  Last data filed at 3/17/2024 1514  Gross per 24 hour   Intake 1077 ml   Output 4675 ml   Net -3598 ml         Physical Exam      Constitutional:       Appearance: He is obese.      Comments: Respiratory distress   Cardiovascular:      Rate and Rhythm: Normal rate. Rhythm irregular.      Pulses: Normal pulses.      Heart sounds: Normal heart sounds.   Pulmonary:      Effort: Pulmonary effort is normal.      Breath sounds: Normal  Abdominal:      General: Bowel sounds are normal.      Palpations: Abdomen is soft.   Musculoskeletal:      Right lower leg: Edema present.      Left lower leg: Edema present.   Skin:     Findings: Bruising and rash present.      Comments: Lower extremities have hyperpigmentation and varicose veins   Neurological:      General: No focal deficit present.      Mental Status: He is alert and oriented to person, place, and time.   Psychiatric:         Mood and Affect: Mood normal.         Behavior: Behavior normal.      Significant Labs: All pertinent labs within the past 24 hours have been reviewed.    Significant Imaging: I have reviewed all pertinent imaging results/findings within the past 24 hours.    Assessment/Plan:      * Acute on chronic heart failure with preserved ejection fraction (HFpEF)  Patient is identified as having Systolic (HFrEF) heart failure that is Acute on chronic. CHF is  currently uncontrolled due to Continued edema of extremities and Weight gain of 15 pounds. Latest ECHO performed and demonstrates- Results for orders placed during the hospital encounter of 01/18/24    Echo    Interpretation Summary    Left Ventricle: The left ventricle is normal in size. Normal wall thickness. Normal wall motion. Septal flattening in systole consistent with right ventricular pressure overload. There is normal systolic function. Ejection fraction by visual approximation is 55%. There is normal diastolic function.    Right Ventricle: Severe right ventricular enlargement. Wall thickness is normal. Right ventricle wall motion has global hypokinesis. Systolic function is severely reduced.    Tricuspid Valve: There is moderate to severe regurgitation.    Pulmonary Artery: There is moderate to severe pulmonary hypertension. The estimated pulmonary artery systolic pressure is 67 mmHg.    IVC/SVC: Intermediate venous pressure at 8 mmHg.    Pericardium: There is a trivial effusion.  . Continue I/V lasix drip and monitor clinical status closely. Monitor on telemetry. Patient is on CHF pathway.  Monitor strict Is&Os and daily weights.  Place on fluid restriction of 1.5 L. Cardiology has not been consulted. Continue to stress to patient importance of self efficacy and  on diet for CHF. Last BNP reviewed- and noted below   Recent Labs   Lab 03/15/24  1207   *           Gout  Continue home allopurinol      Atrial fibrillation  Patient with Paroxysmal (<7 days) atrial fibrillation which is controlled currently.    . Patient is currently in sinus rhythm.[id number] Score: The patient doesn't have any registry metric data available.. Anticoagulation indicated. Anticoagulation done with warfarin  .  -Daily INR     GERD (gastroesophageal reflux disease)  Continue pantoprazole       CKD (chronic kidney disease), stage III  Creatine stable for now. BMP reviewed- noted Estimated Creatinine Clearance: 46.8  mL/min (A) (based on SCr of 2.2 mg/dL (H)). according to latest data. Based on current GFR, CKD stage is stage 3 - GFR 30-59.  Monitor UOP and serial BMP and adjust therapy as needed. Renally dose meds. Avoid nephrotoxic medications and procedures.    MALLIKA (obstructive sleep apnea)  Patient has a history of MALLIKA and wears Bipap at home. Will continue Bipap qhs.      Pulmonary hypertension  Patient has a history of pulmonary hypertension with severe right ventricular enlargement. Wall thickness is normal. Right ventricle wall motion has global hypokinesis. Systolic function is severely reduced. Metoprolol was held because of pulmonary hypertension.        VTE Risk Mitigation (From admission, onward)           Ordered     warfarin (COUMADIN) tablet 5 mg  Daily         03/16/24 0945     IP VTE HIGH RISK PATIENT  Once         03/15/24 1700     Place sequential compression device  Until discontinued         03/15/24 1700                    Discharge Planning   ESTEBAN:      Code Status: Full Code   Is the patient medically ready for discharge?:     Reason for patient still in hospital (select all that apply): Treatment  Discharge Plan A: Home                  Montserrat Pollock MD  Department of Hospital Medicine   [facility] - Cardiology Stepdown

## 2024-03-17 NOTE — ASSESSMENT & PLAN NOTE
Patient is identified as having Systolic (HFrEF) heart failure that is Acute on chronic. CHF is currently uncontrolled due to Continued edema of extremities and Weight gain of 15 pounds. Latest ECHO performed and demonstrates- Results for orders placed during the hospital encounter of 01/18/24    Echo    Interpretation Summary    Left Ventricle: The left ventricle is normal in size. Normal wall thickness. Normal wall motion. Septal flattening in systole consistent with right ventricular pressure overload. There is normal systolic function. Ejection fraction by visual approximation is 55%. There is normal diastolic function.    Right Ventricle: Severe right ventricular enlargement. Wall thickness is normal. Right ventricle wall motion has global hypokinesis. Systolic function is severely reduced.    Tricuspid Valve: There is moderate to severe regurgitation.    Pulmonary Artery: There is moderate to severe pulmonary hypertension. The estimated pulmonary artery systolic pressure is 67 mmHg.    IVC/SVC: Intermediate venous pressure at 8 mmHg.    Pericardium: There is a trivial effusion.  . Continue I/V lasix drip and monitor clinical status closely. Monitor on telemetry. Patient is on CHF pathway.  Monitor strict Is&Os and daily weights.  Place on fluid restriction of 1.5 L. Cardiology has not been consulted. Continue to stress to patient importance of self efficacy and  on diet for CHF. Last BNP reviewed- and noted below   Recent Labs   Lab 03/15/24  1207   *

## 2024-03-17 NOTE — SUBJECTIVE & OBJECTIVE
Interval History: DEBBIE HERRERA, patient has been diuresing well on lasix drip. Creatinine bumped a little therefore will decrease the lianna of lasix and also add acetazolamide for contraction alkalosis.          Objective:     Vital Signs (Most Recent):  Temp: 98.3 °F (36.8 °C) (03/17/24 1143)  Pulse: (!) 111 (03/17/24 1443)  Resp: 20 (03/17/24 1143)  BP: (!) 99/58 (03/17/24 1143)  SpO2: (!) 92 % (03/17/24 1143) Vital Signs (24h Range):  Temp:  [97.4 °F (36.3 °C)-98.3 °F (36.8 °C)] 98.3 °F (36.8 °C)  Pulse:  [] 111  Resp:  [18-22] 20  SpO2:  [90 %-98 %] 92 %  BP: ()/(58-68) 99/58     Weight: 109.3 kg (241 lb)  Body mass index is 40.1 kg/m².    Intake/Output Summary (Last 24 hours) at 3/17/2024 1530  Last data filed at 3/17/2024 1514  Gross per 24 hour   Intake 1077 ml   Output 4675 ml   Net -3598 ml         Physical Exam      Constitutional:       Appearance: He is obese.      Comments: Respiratory distress   Cardiovascular:      Rate and Rhythm: Normal rate. Rhythm irregular.      Pulses: Normal pulses.      Heart sounds: Normal heart sounds.   Pulmonary:      Effort: Pulmonary effort is normal.      Breath sounds: Normal  Abdominal:      General: Bowel sounds are normal.      Palpations: Abdomen is soft.   Musculoskeletal:      Right lower leg: Edema present.      Left lower leg: Edema present.   Skin:     Findings: Bruising and rash present.      Comments: Lower extremities have hyperpigmentation and varicose veins   Neurological:      General: No focal deficit present.      Mental Status: He is alert and oriented to person, place, and time.   Psychiatric:         Mood and Affect: Mood normal.         Behavior: Behavior normal.      Significant Labs: All pertinent labs within the past 24 hours have been reviewed.    Significant Imaging: I have reviewed all pertinent imaging results/findings within the past 24 hours.

## 2024-03-18 PROBLEM — I48.0 PAROXYSMAL ATRIAL FIBRILLATION: Status: ACTIVE | Noted: 2024-03-15

## 2024-03-18 PROBLEM — E66.9 OBESITY (BMI 30-39.9): Status: ACTIVE | Noted: 2024-03-18

## 2024-03-18 LAB
ALBUMIN SERPL BCP-MCNC: 2.8 G/DL (ref 3.5–5.2)
ALLENS TEST: ABNORMAL
ALP SERPL-CCNC: 114 U/L (ref 55–135)
ALT SERPL W/O P-5'-P-CCNC: 13 U/L (ref 10–44)
ANION GAP SERPL CALC-SCNC: 10 MMOL/L (ref 8–16)
ANION GAP SERPL CALC-SCNC: 11 MMOL/L (ref 8–16)
ANION GAP SERPL CALC-SCNC: 13 MMOL/L (ref 8–16)
ANION GAP SERPL CALC-SCNC: 14 MMOL/L (ref 8–16)
ANION GAP SERPL CALC-SCNC: 9 MMOL/L (ref 8–16)
AST SERPL-CCNC: 19 U/L (ref 10–40)
BASOPHILS # BLD AUTO: 0.08 K/UL (ref 0–0.2)
BASOPHILS NFR BLD: 1 % (ref 0–1.9)
BILIRUB SERPL-MCNC: 1.6 MG/DL (ref 0.1–1)
BUN SERPL-MCNC: 67 MG/DL (ref 6–20)
BUN SERPL-MCNC: 69 MG/DL (ref 6–20)
BUN SERPL-MCNC: 71 MG/DL (ref 6–20)
BUN SERPL-MCNC: 75 MG/DL (ref 6–20)
BUN SERPL-MCNC: 76 MG/DL (ref 6–20)
CALCIUM SERPL-MCNC: 9.1 MG/DL (ref 8.7–10.5)
CALCIUM SERPL-MCNC: 9.6 MG/DL (ref 8.7–10.5)
CALCIUM SERPL-MCNC: 9.7 MG/DL (ref 8.7–10.5)
CALCIUM SERPL-MCNC: 9.8 MG/DL (ref 8.7–10.5)
CALCIUM SERPL-MCNC: 9.8 MG/DL (ref 8.7–10.5)
CHLORIDE SERPL-SCNC: 88 MMOL/L (ref 95–110)
CHLORIDE SERPL-SCNC: 88 MMOL/L (ref 95–110)
CHLORIDE SERPL-SCNC: 89 MMOL/L (ref 95–110)
CHLORIDE SERPL-SCNC: 89 MMOL/L (ref 95–110)
CHLORIDE SERPL-SCNC: 91 MMOL/L (ref 95–110)
CO2 SERPL-SCNC: 33 MMOL/L (ref 23–29)
CO2 SERPL-SCNC: 34 MMOL/L (ref 23–29)
CO2 SERPL-SCNC: 37 MMOL/L (ref 23–29)
CO2 SERPL-SCNC: 39 MMOL/L (ref 23–29)
CO2 SERPL-SCNC: 40 MMOL/L (ref 23–29)
CREAT SERPL-MCNC: 1.9 MG/DL (ref 0.5–1.4)
CREAT SERPL-MCNC: 1.9 MG/DL (ref 0.5–1.4)
CREAT SERPL-MCNC: 2 MG/DL (ref 0.5–1.4)
CREAT SERPL-MCNC: 2 MG/DL (ref 0.5–1.4)
CREAT SERPL-MCNC: 2.1 MG/DL (ref 0.5–1.4)
DELSYS: ABNORMAL
DIFFERENTIAL METHOD BLD: ABNORMAL
EOSINOPHIL # BLD AUTO: 0 K/UL (ref 0–0.5)
EOSINOPHIL NFR BLD: 0.1 % (ref 0–8)
ERYTHROCYTE [DISTWIDTH] IN BLOOD BY AUTOMATED COUNT: 20.8 % (ref 11.5–14.5)
EST. GFR  (NO RACE VARIABLE): 38.1 ML/MIN/1.73 M^2
EST. GFR  (NO RACE VARIABLE): 40.4 ML/MIN/1.73 M^2
EST. GFR  (NO RACE VARIABLE): 40.4 ML/MIN/1.73 M^2
EST. GFR  (NO RACE VARIABLE): 43 ML/MIN/1.73 M^2
EST. GFR  (NO RACE VARIABLE): 43 ML/MIN/1.73 M^2
FLOW: 4
GLUCOSE SERPL-MCNC: 104 MG/DL (ref 70–110)
GLUCOSE SERPL-MCNC: 108 MG/DL (ref 70–110)
GLUCOSE SERPL-MCNC: 124 MG/DL (ref 70–110)
GLUCOSE SERPL-MCNC: 88 MG/DL (ref 70–110)
GLUCOSE SERPL-MCNC: 88 MG/DL (ref 70–110)
HCO3 UR-SCNC: 42.9 MMOL/L (ref 24–28)
HCT VFR BLD AUTO: 53.7 % (ref 40–54)
HCT VFR BLD CALC: 61 %PCV (ref 36–54)
HGB BLD-MCNC: 15.4 G/DL (ref 14–18)
IMM GRANULOCYTES # BLD AUTO: 0.02 K/UL (ref 0–0.04)
IMM GRANULOCYTES NFR BLD AUTO: 0.2 % (ref 0–0.5)
INR PPP: 1.9 (ref 0.8–1.2)
LYMPHOCYTES # BLD AUTO: 1.5 K/UL (ref 1–4.8)
LYMPHOCYTES NFR BLD: 18.5 % (ref 18–48)
MAGNESIUM SERPL-MCNC: 2 MG/DL (ref 1.6–2.6)
MAGNESIUM SERPL-MCNC: 2.1 MG/DL (ref 1.6–2.6)
MAGNESIUM SERPL-MCNC: 2.2 MG/DL (ref 1.6–2.6)
MCH RBC QN AUTO: 25.9 PG (ref 27–31)
MCHC RBC AUTO-ENTMCNC: 28.7 G/DL (ref 32–36)
MCV RBC AUTO: 90 FL (ref 82–98)
MODE: ABNORMAL
MONOCYTES # BLD AUTO: 0.6 K/UL (ref 0.3–1)
MONOCYTES NFR BLD: 7.5 % (ref 4–15)
NEUTROPHILS # BLD AUTO: 6 K/UL (ref 1.8–7.7)
NEUTROPHILS NFR BLD: 72.7 % (ref 38–73)
NRBC BLD-RTO: 0 /100 WBC
PCO2 BLDA: 69.8 MMHG (ref 35–45)
PH SMN: 7.4 [PH] (ref 7.35–7.45)
PHOSPHATE SERPL-MCNC: 4.1 MG/DL (ref 2.7–4.5)
PLATELET # BLD AUTO: 220 K/UL (ref 150–450)
PMV BLD AUTO: 10.1 FL (ref 9.2–12.9)
PO2 BLDA: 25 MMHG (ref 40–60)
POC BE: 18 MMOL/L
POC IONIZED CALCIUM: 1.19 MMOL/L (ref 1.06–1.42)
POC SATURATED O2: 41 % (ref 95–100)
POC TCO2: 45 MMOL/L (ref 24–29)
POTASSIUM BLD-SCNC: 4 MMOL/L (ref 3.5–5.1)
POTASSIUM SERPL-SCNC: 3.2 MMOL/L (ref 3.5–5.1)
POTASSIUM SERPL-SCNC: 3.3 MMOL/L (ref 3.5–5.1)
POTASSIUM SERPL-SCNC: 3.8 MMOL/L (ref 3.5–5.1)
POTASSIUM SERPL-SCNC: 3.9 MMOL/L (ref 3.5–5.1)
POTASSIUM SERPL-SCNC: 4.1 MMOL/L (ref 3.5–5.1)
PROT SERPL-MCNC: 7.6 G/DL (ref 6–8.4)
PROTHROMBIN TIME: 19.2 SEC (ref 9–12.5)
RBC # BLD AUTO: 5.94 M/UL (ref 4.6–6.2)
SAMPLE: ABNORMAL
SITE: ABNORMAL
SODIUM BLD-SCNC: 138 MMOL/L (ref 136–145)
SODIUM SERPL-SCNC: 135 MMOL/L (ref 136–145)
SODIUM SERPL-SCNC: 136 MMOL/L (ref 136–145)
SODIUM SERPL-SCNC: 138 MMOL/L (ref 136–145)
WBC # BLD AUTO: 8.29 K/UL (ref 3.9–12.7)

## 2024-03-18 PROCEDURE — 36415 COLL VENOUS BLD VENIPUNCTURE: CPT | Mod: XB

## 2024-03-18 PROCEDURE — 83735 ASSAY OF MAGNESIUM: CPT | Mod: 91

## 2024-03-18 PROCEDURE — 94660 CPAP INITIATION&MGMT: CPT

## 2024-03-18 PROCEDURE — 99900035 HC TECH TIME PER 15 MIN (STAT)

## 2024-03-18 PROCEDURE — 94640 AIRWAY INHALATION TREATMENT: CPT

## 2024-03-18 PROCEDURE — 25000003 PHARM REV CODE 250

## 2024-03-18 PROCEDURE — 85610 PROTHROMBIN TIME: CPT

## 2024-03-18 PROCEDURE — 25000242 PHARM REV CODE 250 ALT 637 W/ HCPCS

## 2024-03-18 PROCEDURE — 27100171 HC OXYGEN HIGH FLOW UP TO 24 HOURS

## 2024-03-18 PROCEDURE — 80048 BASIC METABOLIC PNL TOTAL CA: CPT | Mod: 91,XB

## 2024-03-18 PROCEDURE — 80053 COMPREHEN METABOLIC PANEL: CPT

## 2024-03-18 PROCEDURE — 94761 N-INVAS EAR/PLS OXIMETRY MLT: CPT

## 2024-03-18 PROCEDURE — 20600001 HC STEP DOWN PRIVATE ROOM

## 2024-03-18 PROCEDURE — 84100 ASSAY OF PHOSPHORUS: CPT

## 2024-03-18 PROCEDURE — 83735 ASSAY OF MAGNESIUM: CPT

## 2024-03-18 PROCEDURE — 63600150 PHARM REV CODE 636

## 2024-03-18 PROCEDURE — 85025 COMPLETE CBC W/AUTO DIFF WBC: CPT

## 2024-03-18 RX ORDER — POLYETHYLENE GLYCOL 3350 17 G/17G
17 POWDER, FOR SOLUTION ORAL DAILY
Status: DISCONTINUED | OUTPATIENT
Start: 2024-03-18 | End: 2024-03-21 | Stop reason: HOSPADM

## 2024-03-18 RX ORDER — SENNOSIDES 8.6 MG/1
8.6 TABLET ORAL DAILY
Status: DISCONTINUED | OUTPATIENT
Start: 2024-03-18 | End: 2024-03-21 | Stop reason: HOSPADM

## 2024-03-18 RX ORDER — POTASSIUM CHLORIDE 20 MEQ/1
40 TABLET, EXTENDED RELEASE ORAL
Status: COMPLETED | OUTPATIENT
Start: 2024-03-18 | End: 2024-03-18

## 2024-03-18 RX ORDER — ACETAZOLAMIDE 250 MG/1
250 TABLET ORAL DAILY
Status: DISCONTINUED | OUTPATIENT
Start: 2024-03-18 | End: 2024-03-21 | Stop reason: HOSPADM

## 2024-03-18 RX ORDER — POTASSIUM CHLORIDE 20 MEQ/1
40 TABLET, EXTENDED RELEASE ORAL ONCE
Status: COMPLETED | OUTPATIENT
Start: 2024-03-18 | End: 2024-03-18

## 2024-03-18 RX ADMIN — ACETAZOLAMIDE 250 MG: 250 TABLET ORAL at 04:03

## 2024-03-18 RX ADMIN — PANTOPRAZOLE SODIUM 40 MG: 40 TABLET, DELAYED RELEASE ORAL at 09:03

## 2024-03-18 RX ADMIN — WARFARIN SODIUM 5 MG: 5 TABLET ORAL at 04:03

## 2024-03-18 RX ADMIN — FLUTICASONE FUROATE AND VILANTEROL TRIFENATATE 1 PUFF: 100; 25 POWDER RESPIRATORY (INHALATION) at 07:03

## 2024-03-18 RX ADMIN — POTASSIUM CHLORIDE 40 MEQ: 1500 TABLET, EXTENDED RELEASE ORAL at 03:03

## 2024-03-18 RX ADMIN — ALLOPURINOL 300 MG: 300 TABLET ORAL at 09:03

## 2024-03-18 RX ADMIN — POLYETHYLENE GLYCOL 3350 17 G: 17 POWDER, FOR SOLUTION ORAL at 10:03

## 2024-03-18 RX ADMIN — TIOTROPIUM BROMIDE INHALATION SPRAY 2 PUFF: 3.12 SPRAY, METERED RESPIRATORY (INHALATION) at 05:03

## 2024-03-18 RX ADMIN — SENNOSIDES 8.6 MG: 8.6 TABLET, FILM COATED ORAL at 10:03

## 2024-03-18 RX ADMIN — IPRATROPIUM BROMIDE AND ALBUTEROL SULFATE 3 ML: .5; 3 SOLUTION RESPIRATORY (INHALATION) at 05:03

## 2024-03-18 RX ADMIN — POTASSIUM CHLORIDE 40 MEQ: 1500 TABLET, EXTENDED RELEASE ORAL at 12:03

## 2024-03-18 RX ADMIN — POTASSIUM CHLORIDE 40 MEQ: 1500 TABLET, EXTENDED RELEASE ORAL at 09:03

## 2024-03-18 RX ADMIN — SODIUM CHLORIDE 15 MG/HR: 9 INJECTION, SOLUTION INTRAVENOUS at 01:03

## 2024-03-18 RX ADMIN — IPRATROPIUM BROMIDE AND ALBUTEROL SULFATE 3 ML: .5; 3 SOLUTION RESPIRATORY (INHALATION) at 07:03

## 2024-03-18 NOTE — NURSING
Patient continuously disconnecting self from continuous pulse-ox monitoring to eat, talk on phone, use urinal ,etc. Patient educated on the need to stay connected d/t the order for continuous O2 monitoring.

## 2024-03-18 NOTE — SUBJECTIVE & OBJECTIVE
Interval History: overnight one BP of 83/54, but came back up. Lasix was decreased to 5mg/hr, but patient's weight was recorded was 241 yesterday (erroneous?), so increased back up to 15 today. Added bowel regimen since BM 3/16. INR 1.9  Feels better with duonebs    Review of Systems   Constitutional:  Positive for activity change, fatigue and unexpected weight change.   Respiratory:  Positive for shortness of breath. Negative for cough.    Cardiovascular:  Positive for leg swelling. Negative for chest pain.   Gastrointestinal:  Positive for constipation.   Genitourinary:  Positive for frequency.   Psychiatric/Behavioral:  Negative for agitation, confusion and decreased concentration.      Objective:     Vital Signs (Most Recent):  Temp: 97.8 °F (36.6 °C) (03/18/24 1216)  Pulse: 95 (03/18/24 1216)  Resp: 20 (03/18/24 1216)  BP: 104/71 (03/18/24 1216)  SpO2: 98 % (03/18/24 1216) Vital Signs (24h Range):  Temp:  [97.8 °F (36.6 °C)-98.7 °F (37.1 °C)] 97.8 °F (36.6 °C)  Pulse:  [] 95  Resp:  [15-22] 20  SpO2:  [89 %-98 %] 98 %  BP: ()/(54-73) 104/71     Weight: 106.6 kg (235 lb 0.2 oz)  Body mass index is 39.11 kg/m².    Intake/Output Summary (Last 24 hours) at 3/18/2024 1359  Last data filed at 3/18/2024 1019  Gross per 24 hour   Intake 1381 ml   Output 2925 ml   Net -1544 ml         Physical Exam  Constitutional:       Appearance: He is obese.      Comments: Respiratory distress   Cardiovascular:      Rate and Rhythm: Normal rate. Rhythm irregular.      Pulses: Normal pulses.      Heart sounds: Normal heart sounds.   Pulmonary:      Effort: Pulmonary effort is normal. No respiratory distress.      Breath sounds: Rhonchi present. No wheezing or rales.   Abdominal:      General: Bowel sounds are normal.      Palpations: Abdomen is soft.   Musculoskeletal:      Right lower leg: Edema present.      Left lower leg: Edema present.   Skin:     Findings: Bruising and rash present.      Comments: Lower extremities  "have hyperpigmentation and varicose veins   Neurological:      General: No focal deficit present.      Mental Status: He is alert and oriented to person, place, and time.   Psychiatric:         Mood and Affect: Mood normal.         Behavior: Behavior normal.         Thought Content: Thought content normal.             Significant Labs: All pertinent labs within the past 24 hours have been reviewed.  A1C:   Recent Labs   Lab 01/19/24  0527   HGBA1C 5.8*     CBC:   Recent Labs   Lab 03/17/24 0411 03/18/24 0427   WBC 8.05 8.29   HGB 15.2 15.4   HCT 53.4 53.7    220     CMP:   Recent Labs   Lab 03/17/24  0411 03/17/24  1340 03/17/24  2340 03/18/24 0427 03/18/24  1123      < > 136 138  138 138   K 3.7   < > 3.9 3.3*  3.2* 3.8   CL 88*   < > 88* 88*  89* 91*   CO2 41*   < > 34* 39*  40* 37*      < > 104 88  88 124*   BUN 75*   < > 71* 75*  76* 67*   CREATININE 1.9*   < > 2.0* 2.1*  2.0* 1.9*   CALCIUM 9.6   < > 9.8 9.7  9.6 9.1   PROT 7.5  --   --  7.6  --    ALBUMIN 2.8*  --   --  2.8*  --    BILITOT 1.7*  --   --  1.6*  --    ALKPHOS 125  --   --  114  --    AST 19  --   --  19  --    ALT 14  --   --  13  --    ANIONGAP 12   < > 14 11  9 10    < > = values in this interval not displayed.     Coagulation:   Recent Labs   Lab 03/18/24 0427   INR 1.9*     POCT Glucose: No results for input(s): "POCTGLUCOSE" in the last 48 hours.    Significant Imaging: I have reviewed all pertinent imaging results/findings within the past 24 hours.  "

## 2024-03-18 NOTE — ASSESSMENT & PLAN NOTE
Patient with Hypercapnic and Hypoxic Respiratory failure which is Acute on chronic.  he is on home oxygen at 3 LPM. Supplemental oxygen was provided and noted- Oxygen Concentration (%):  [40] 40    .   Signs/symptoms of respiratory failure include- increased work of breathing. Contributing diagnoses includes - CHF and Obesity Hypoventilation Labs and images were reviewed. Patient Has recent ABG, which has been reviewed. Will treat underlying causes and adjust management of respiratory failure as follows-     - diuresis  - inhalers, duonebs

## 2024-03-18 NOTE — PROGRESS NOTES
[facility] Card[facility] Medicine  Progress Note    Patient Name: Yong Mcintyre  MRN: [id number]  Patient Class: IP- Inpatient   Admission Date: 3/15/2024  Length of Stay: 3 days  Attending Physician: Juice Hurtado MD  Primary Care Provider: Gianni Escalona MD        Subjective:     Principal Problem:Acute on chronic heart failure with preserved ejection fraction (HFpEF)        HPI:  Mr. Mcintyre is a 49 y/o male with a PMH of HFrEF, HTN, pHTN, Chronic hypoxic respiratory failure (baseline oxygen req at home is 3 L), PFO (unsuccessful closure in 2006), AF (on coumadin), Obesity hypoventilation syndrome, Morbid obesity presenting to the ED with abdominal bloating, dyspnea for the last 4 days, with weight gain of 10 lbs this week. His dry weight is 231 and this morning was weighing 246.Satting 82% on his home O2 of 3 L, currently satting 94% on 5-6L. Patient reports he has been having this epigastric bloating and associated belching that has been reoccurring. Patient reports that he visited his PCP, and suspected GERD and prescribed him Protonix with little improvement. Patient denies fever, N/V/D, no changes in BM, no increasing passing of gas, no chills, dysuria, flank pain, does endorse orthopnea. Patient does admit to SOB (seems to be chronic but worsens with bloating), belching, and feeling mildly nauseous. Patient has been adherent to medication regimen consisting diuretics and 2 g sodium diet. No new sick contacts. Performs ADL's and tries to works out.       In the ED /64, , RR 18, on 6L oxygen via [place], Labs significant for INR 2.3 (goal 2-3), Na 134, K 2.7, , troponin 0.042, Bun 91, Cr 2.4, PcO2 75.7, HCO3 48.1, he was given 80 lasix in ED with 500cc UOP so far also found to have long Qtc on repeat EKG.     Overview/Hospital Course:  Has been diuresing well on IV lasix drip with intermittent diamox for metabolic alkalosis. Approaching dry weight.     Interval History:  overnight one BP of 83/54, but came back up. Lasix was decreased to 5mg/hr, but patient's weight was recorded was 241 yesterday (erroneous?), so increased back up to 15 today. Added bowel regimen since BM 3/16. INR 1.9  Feels better with duonebs    Review of Systems   Constitutional:  Positive for activity change, fatigue and unexpected weight change.   Respiratory:  Positive for shortness of breath. Negative for cough.    Cardiovascular:  Positive for leg swelling. Negative for chest pain.   Gastrointestinal:  Positive for constipation.   Genitourinary:  Positive for frequency.   Psychiatric/Behavioral:  Negative for agitation, confusion and decreased concentration.      Objective:     Vital Signs (Most Recent):  Temp: 97.8 °F (36.6 °C) (03/18/24 1216)  Pulse: 95 (03/18/24 1216)  Resp: 20 (03/18/24 1216)  BP: 104/71 (03/18/24 1216)  SpO2: 98 % (03/18/24 1216) Vital Signs (24h Range):  Temp:  [97.8 °F (36.6 °C)-98.7 °F (37.1 °C)] 97.8 °F (36.6 °C)  Pulse:  [] 95  Resp:  [15-22] 20  SpO2:  [89 %-98 %] 98 %  BP: ()/(54-73) 104/71     Weight: 106.6 kg (235 lb 0.2 oz)  Body mass index is 39.11 kg/m².    Intake/Output Summary (Last 24 hours) at 3/18/2024 1359  Last data filed at 3/18/2024 1019  Gross per 24 hour   Intake 1381 ml   Output 2925 ml   Net -1544 ml         Physical Exam  Constitutional:       Appearance: He is obese.      Comments: Respiratory distress   Cardiovascular:      Rate and Rhythm: Normal rate. Rhythm irregular.      Pulses: Normal pulses.      Heart sounds: Normal heart sounds.   Pulmonary:      Effort: Pulmonary effort is normal. No respiratory distress.      Breath sounds: Rhonchi present. No wheezing or rales.   Abdominal:      General: Bowel sounds are normal.      Palpations: Abdomen is soft.   Musculoskeletal:      Right lower leg: Edema present.      Left lower leg: Edema present.   Skin:     Findings: Bruising and rash present.      Comments: Lower extremities have  "hyperpigmentation and varicose veins   Neurological:      General: No focal deficit present.      Mental Status: He is alert and oriented to person, place, and time.   Psychiatric:         Mood and Affect: Mood normal.         Behavior: Behavior normal.         Thought Content: Thought content normal.             Significant Labs: All pertinent labs within the past 24 hours have been reviewed.  A1C:   Recent Labs   Lab 01/19/24  0527   HGBA1C 5.8*     CBC:   Recent Labs   Lab 03/17/24  0411 03/18/24  0427   WBC 8.05 8.29   HGB 15.2 15.4   HCT 53.4 53.7    220     CMP:   Recent Labs   Lab 03/17/24  0411 03/17/24  1340 03/17/24  2340 03/18/24  0427 03/18/24  1123      < > 136 138  138 138   K 3.7   < > 3.9 3.3*  3.2* 3.8   CL 88*   < > 88* 88*  89* 91*   CO2 41*   < > 34* 39*  40* 37*      < > 104 88  88 124*   BUN 75*   < > 71* 75*  76* 67*   CREATININE 1.9*   < > 2.0* 2.1*  2.0* 1.9*   CALCIUM 9.6   < > 9.8 9.7  9.6 9.1   PROT 7.5  --   --  7.6  --    ALBUMIN 2.8*  --   --  2.8*  --    BILITOT 1.7*  --   --  1.6*  --    ALKPHOS 125  --   --  114  --    AST 19  --   --  19  --    ALT 14  --   --  13  --    ANIONGAP 12   < > 14 11  9 10    < > = values in this interval not displayed.     Coagulation:   Recent Labs   Lab 03/18/24 0427   INR 1.9*     POCT Glucose: No results for input(s): "POCTGLUCOSE" in the last 48 hours.    Significant Imaging: I have reviewed all pertinent imaging results/findings within the past 24 hours.    Assessment/Plan:      * Acute on chronic heart failure with preserved ejection fraction (HFpEF)  Patient is identified as having Systolic (HFrEF) heart failure that is Acute on chronic. CHF is currently uncontrolled due to Continued edema of extremities and Weight gain of 15 pounds. Latest ECHO performed and demonstrates- Results for orders placed during the hospital encounter of 01/18/24    Echo    Interpretation Summary    Left Ventricle: The left ventricle is " normal in size. Normal wall thickness. Normal wall motion. Septal flattening in systole consistent with right ventricular pressure overload. There is normal systolic function. Ejection fraction by visual approximation is 55%. There is normal diastolic function.    Right Ventricle: Severe right ventricular enlargement. Wall thickness is normal. Right ventricle wall motion has global hypokinesis. Systolic function is severely reduced.    Tricuspid Valve: There is moderate to severe regurgitation.    Pulmonary Artery: There is moderate to severe pulmonary hypertension. The estimated pulmonary artery systolic pressure is 67 mmHg.    IVC/SVC: Intermediate venous pressure at 8 mmHg.    Pericardium: There is a trivial effusion.  . Continue I/V lasix drip and monitor clinical status closely. Monitor on telemetry. Patient is on CHF pathway.  Monitor strict Is&Os and daily weights.  Place on fluid restriction of 1.5 L. Cardiology has not been consulted. Continue to stress to patient importance of self efficacy and  on diet for CHF. Last BNP reviewed- and noted below   Recent Labs   Lab 03/15/24  1207   *           Obesity (BMI 30-39.9)  Body mass index is 39.11 kg/m². Morbid obesity complicates all aspects of disease management from diagnostic modalities to treatment. Weight loss encouraged and health benefits explained to patient.         Gout  Continue home allopurinol      Paroxysmal atrial fibrillation  Patient with Paroxysmal (<7 days) atrial fibrillation which is controlled currently.    . Patient is currently in sinus rhythm.[id number] Score: The patient doesn't have any registry metric data available.. Anticoagulation indicated. Anticoagulation done with warfarin  .  -Daily INR     GERD (gastroesophageal reflux disease)  Continue pantoprazole       CKD (chronic kidney disease), stage III  Creatine stable for now. BMP reviewed- noted Estimated Creatinine Clearance: 53.5 mL/min (A) (based on SCr of 1.9  mg/dL (H)). according to latest data. Based on current GFR, CKD stage is stage 3 - GFR 30-59.  Monitor UOP and serial BMP and adjust therapy as needed. Renally dose meds. Avoid nephrotoxic medications and procedures.    Acute on chronic respiratory failure with hypoxia and hypercapnia  Patient with Hypercapnic and Hypoxic Respiratory failure which is Acute on chronic.  he is on home oxygen at 3 LPM. Supplemental oxygen was provided and noted- Oxygen Concentration (%):  [40] 40    .   Signs/symptoms of respiratory failure include- increased work of breathing. Contributing diagnoses includes - CHF and Obesity Hypoventilation Labs and images were reviewed. Patient Has recent ABG, which has been reviewed. Will treat underlying causes and adjust management of respiratory failure as follows-     - diuresis  - inhalers, duonebs    MALLIKA (obstructive sleep apnea)  Patient has a history of MALLIKA and wears Bipap at home. Will continue Bipap qhs.      PFO (patent foramen ovale)        Pulmonary hypertension  Patient has a history of pulmonary hypertension with severe right ventricular enlargement. Wall thickness is normal. Right ventricle wall motion has global hypokinesis. Systolic function is severely reduced. Metoprolol was held because of pulmonary hypertension.        VTE Risk Mitigation (From admission, onward)           Ordered     warfarin (COUMADIN) tablet 5 mg  Daily         03/16/24 0945     IP VTE HIGH RISK PATIENT  Once         03/15/24 1700     Place sequential compression device  Until discontinued         03/15/24 1700                    Discharge Planning   ESTEBAN: 3/20/2024     Code Status: Full Code   Is the patient medically ready for discharge?: No    Reason for patient still in hospital (select all that apply): Patient trending condition and Treatment  Discharge Plan A: Home                  Rupesh Angulo MD  Department of Hospital Medicine   [facility] - Cardiology Stepdown

## 2024-03-18 NOTE — ASSESSMENT & PLAN NOTE
Creatine stable for now. BMP reviewed- noted Estimated Creatinine Clearance: 53.5 mL/min (A) (based on SCr of 1.9 mg/dL (H)). according to latest data. Based on current GFR, CKD stage is stage 3 - GFR 30-59.  Monitor UOP and serial BMP and adjust therapy as needed. Renally dose meds. Avoid nephrotoxic medications and procedures.

## 2024-03-18 NOTE — ASSESSMENT & PLAN NOTE
Patient with Paroxysmal (<7 days) atrial fibrillation which is controlled currently.    . Patient is currently in sinus rhythm.[id number] Score: The patient doesn't have any registry metric data available.. Anticoagulation indicated. Anticoagulation done with warfarin  .  -Daily INR

## 2024-03-18 NOTE — HOSPITAL COURSE
Has been diuresing well on IV lasix drip with intermittent diamox for metabolic alkalosis. Approaching dry weight. Consult [facility] inpatient due to severe pulmonary hypertension. They didn't have any new recommendations at this time. Transitioned from IV lasix to oral torsemide and metolazone. Patient has been diuresing well on oral diuretic. Was started on diamox inpatient for chronic metabolic acidosis. Was using 3-5 L of oxygen inpatient. Patient states he feel improved and light weight with all the fluid off of him. Will discharge on oral diuretic with outpatient labs and follow up with pulmonology and  PCP. Medically stable for discharge.

## 2024-03-18 NOTE — ASSESSMENT & PLAN NOTE
Body mass index is 39.11 kg/m². Morbid obesity complicates all aspects of disease management from diagnostic modalities to treatment. Weight loss encouraged and health benefits explained to patient.

## 2024-03-18 NOTE — PROGRESS NOTES
[facility] ([facility]) Team notified of pt referral via [facility] (automated inbasket notification) .    Patient screened today March 18, 2023 by provider and RN for enrollment to program.      Pt was deemed not a candidate for enrollment at this time related to patient is followed by [facility]-Advanced Heart Failure Section.Pt followed by [facility], Dr. Jorgensen adjust HF medications.      Pt will require additional follow up planning per primary team.     If pt status, diagnosis, or treatment plan changes , please place AMB referral to [facility] ([id number]) for HFTCC enrollment re-evalution.

## 2024-03-18 NOTE — PLAN OF CARE
Problem: Adult Inpatient Plan of Care  Goal: Plan of Care Review  Outcome: Ongoing, Progressing  Flowsheets (Taken 3/18/2024 0928)  Plan of Care Reviewed With: patient  Goal: Patient-Specific Goal (Individualized)  Outcome: Ongoing, Progressing  Goal: Absence of Hospital-Acquired Illness or Injury  Outcome: Ongoing, Progressing  Goal: Optimal Comfort and Wellbeing  Outcome: Ongoing, Progressing     Problem: Bariatric Environmental Safety  Goal: Safety Maintained with Care  Outcome: Ongoing, Progressing

## 2024-03-18 NOTE — CARE UPDATE
I have reviewed the chart of Yong Mcintyre who is hospitalized for the following:    Active Hospital Problems    Diagnosis    *Acute on chronic heart failure with preserved ejection fraction (HFpEF)    Obesity (BMI 30-39.9)    GERD (gastroesophageal reflux disease)    Paroxysmal atrial fibrillation    Gout    CKD (chronic kidney disease), stage III    Acute on chronic respiratory failure with hypoxia and hypercapnia    MALLIKA (obstructive sleep apnea)    Pulmonary hypertension    PFO (patent foramen ovale)        Ina Corrales NP  Unit Based [facility]

## 2024-03-19 LAB
ALBUMIN SERPL BCP-MCNC: 2.9 G/DL (ref 3.5–5.2)
ALP SERPL-CCNC: 116 U/L (ref 55–135)
ALT SERPL W/O P-5'-P-CCNC: 11 U/L (ref 10–44)
ANION GAP SERPL CALC-SCNC: 10 MMOL/L (ref 8–16)
ANION GAP SERPL CALC-SCNC: 11 MMOL/L (ref 8–16)
ANION GAP SERPL CALC-SCNC: 12 MMOL/L (ref 8–16)
ANION GAP SERPL CALC-SCNC: 9 MMOL/L (ref 8–16)
AST SERPL-CCNC: 19 U/L (ref 10–40)
BASOPHILS # BLD AUTO: 0.07 K/UL (ref 0–0.2)
BASOPHILS NFR BLD: 0.9 % (ref 0–1.9)
BILIRUB SERPL-MCNC: 1.2 MG/DL (ref 0.1–1)
BUN SERPL-MCNC: 64 MG/DL (ref 6–20)
BUN SERPL-MCNC: 65 MG/DL (ref 6–20)
BUN SERPL-MCNC: 68 MG/DL (ref 6–20)
BUN SERPL-MCNC: 69 MG/DL (ref 6–20)
CALCIUM SERPL-MCNC: 10 MG/DL (ref 8.7–10.5)
CALCIUM SERPL-MCNC: 9.1 MG/DL (ref 8.7–10.5)
CALCIUM SERPL-MCNC: 9.2 MG/DL (ref 8.7–10.5)
CALCIUM SERPL-MCNC: 9.6 MG/DL (ref 8.7–10.5)
CHLORIDE SERPL-SCNC: 92 MMOL/L (ref 95–110)
CHLORIDE SERPL-SCNC: 92 MMOL/L (ref 95–110)
CHLORIDE SERPL-SCNC: 94 MMOL/L (ref 95–110)
CHLORIDE SERPL-SCNC: 94 MMOL/L (ref 95–110)
CO2 SERPL-SCNC: 35 MMOL/L (ref 23–29)
CO2 SERPL-SCNC: 37 MMOL/L (ref 23–29)
CO2 SERPL-SCNC: 38 MMOL/L (ref 23–29)
CO2 SERPL-SCNC: 41 MMOL/L (ref 23–29)
CREAT SERPL-MCNC: 1.9 MG/DL (ref 0.5–1.4)
CREAT SERPL-MCNC: 1.9 MG/DL (ref 0.5–1.4)
CREAT SERPL-MCNC: 2.1 MG/DL (ref 0.5–1.4)
CREAT SERPL-MCNC: 2.6 MG/DL (ref 0.5–1.4)
DIFFERENTIAL METHOD BLD: ABNORMAL
EOSINOPHIL # BLD AUTO: 0 K/UL (ref 0–0.5)
EOSINOPHIL NFR BLD: 0 % (ref 0–8)
ERYTHROCYTE [DISTWIDTH] IN BLOOD BY AUTOMATED COUNT: 20.9 % (ref 11.5–14.5)
EST. GFR  (NO RACE VARIABLE): 29.5 ML/MIN/1.73 M^2
EST. GFR  (NO RACE VARIABLE): 38.1 ML/MIN/1.73 M^2
EST. GFR  (NO RACE VARIABLE): 43 ML/MIN/1.73 M^2
EST. GFR  (NO RACE VARIABLE): 43 ML/MIN/1.73 M^2
GLUCOSE SERPL-MCNC: 109 MG/DL (ref 70–110)
GLUCOSE SERPL-MCNC: 112 MG/DL (ref 70–110)
GLUCOSE SERPL-MCNC: 88 MG/DL (ref 70–110)
GLUCOSE SERPL-MCNC: 93 MG/DL (ref 70–110)
HCT VFR BLD AUTO: 54.1 % (ref 40–54)
HGB BLD-MCNC: 15.3 G/DL (ref 14–18)
IMM GRANULOCYTES # BLD AUTO: 0.02 K/UL (ref 0–0.04)
IMM GRANULOCYTES NFR BLD AUTO: 0.3 % (ref 0–0.5)
INR PPP: 1.9 (ref 0.8–1.2)
LYMPHOCYTES # BLD AUTO: 1.5 K/UL (ref 1–4.8)
LYMPHOCYTES NFR BLD: 20.6 % (ref 18–48)
MAGNESIUM SERPL-MCNC: 1.9 MG/DL (ref 1.6–2.6)
MAGNESIUM SERPL-MCNC: 1.9 MG/DL (ref 1.6–2.6)
MAGNESIUM SERPL-MCNC: 2 MG/DL (ref 1.6–2.6)
MAGNESIUM SERPL-MCNC: 2 MG/DL (ref 1.6–2.6)
MCH RBC QN AUTO: 25.7 PG (ref 27–31)
MCHC RBC AUTO-ENTMCNC: 28.3 G/DL (ref 32–36)
MCV RBC AUTO: 91 FL (ref 82–98)
MONOCYTES # BLD AUTO: 0.6 K/UL (ref 0.3–1)
MONOCYTES NFR BLD: 8.4 % (ref 4–15)
NEUTROPHILS # BLD AUTO: 5.2 K/UL (ref 1.8–7.7)
NEUTROPHILS NFR BLD: 69.8 % (ref 38–73)
NRBC BLD-RTO: 0 /100 WBC
PHOSPHATE SERPL-MCNC: 4.8 MG/DL (ref 2.7–4.5)
PLATELET # BLD AUTO: 205 K/UL (ref 150–450)
PMV BLD AUTO: 10.4 FL (ref 9.2–12.9)
POTASSIUM SERPL-SCNC: 3.6 MMOL/L (ref 3.5–5.1)
POTASSIUM SERPL-SCNC: 3.7 MMOL/L (ref 3.5–5.1)
POTASSIUM SERPL-SCNC: 3.7 MMOL/L (ref 3.5–5.1)
POTASSIUM SERPL-SCNC: 4.1 MMOL/L (ref 3.5–5.1)
PROT SERPL-MCNC: 7.3 G/DL (ref 6–8.4)
PROTHROMBIN TIME: 19.3 SEC (ref 9–12.5)
RBC # BLD AUTO: 5.95 M/UL (ref 4.6–6.2)
SODIUM SERPL-SCNC: 140 MMOL/L (ref 136–145)
SODIUM SERPL-SCNC: 141 MMOL/L (ref 136–145)
SODIUM SERPL-SCNC: 142 MMOL/L (ref 136–145)
SODIUM SERPL-SCNC: 142 MMOL/L (ref 136–145)
WBC # BLD AUTO: 7.41 K/UL (ref 3.9–12.7)

## 2024-03-19 PROCEDURE — 94640 AIRWAY INHALATION TREATMENT: CPT

## 2024-03-19 PROCEDURE — 80048 BASIC METABOLIC PNL TOTAL CA: CPT | Mod: 91,XB

## 2024-03-19 PROCEDURE — 83735 ASSAY OF MAGNESIUM: CPT

## 2024-03-19 PROCEDURE — 99900035 HC TECH TIME PER 15 MIN (STAT)

## 2024-03-19 PROCEDURE — 85610 PROTHROMBIN TIME: CPT

## 2024-03-19 PROCEDURE — 85025 COMPLETE CBC W/AUTO DIFF WBC: CPT

## 2024-03-19 PROCEDURE — 63600175 PHARM REV CODE 636 W HCPCS

## 2024-03-19 PROCEDURE — 36415 COLL VENOUS BLD VENIPUNCTURE: CPT | Mod: XB

## 2024-03-19 PROCEDURE — 84100 ASSAY OF PHOSPHORUS: CPT

## 2024-03-19 PROCEDURE — 25000242 PHARM REV CODE 250 ALT 637 W/ HCPCS

## 2024-03-19 PROCEDURE — 83735 ASSAY OF MAGNESIUM: CPT | Mod: 91

## 2024-03-19 PROCEDURE — 25000003 PHARM REV CODE 250

## 2024-03-19 PROCEDURE — 94761 N-INVAS EAR/PLS OXIMETRY MLT: CPT

## 2024-03-19 PROCEDURE — 94660 CPAP INITIATION&MGMT: CPT

## 2024-03-19 PROCEDURE — 27100171 HC OXYGEN HIGH FLOW UP TO 24 HOURS

## 2024-03-19 PROCEDURE — 80053 COMPREHEN METABOLIC PANEL: CPT

## 2024-03-19 PROCEDURE — 20600001 HC STEP DOWN PRIVATE ROOM

## 2024-03-19 RX ORDER — POTASSIUM CHLORIDE 750 MG/1
10 CAPSULE, EXTENDED RELEASE ORAL ONCE
Status: COMPLETED | OUTPATIENT
Start: 2024-03-19 | End: 2024-03-19

## 2024-03-19 RX ADMIN — PANTOPRAZOLE SODIUM 40 MG: 40 TABLET, DELAYED RELEASE ORAL at 09:03

## 2024-03-19 RX ADMIN — IPRATROPIUM BROMIDE AND ALBUTEROL SULFATE 3 ML: .5; 3 SOLUTION RESPIRATORY (INHALATION) at 11:03

## 2024-03-19 RX ADMIN — SENNOSIDES 8.6 MG: 8.6 TABLET, FILM COATED ORAL at 09:03

## 2024-03-19 RX ADMIN — POLYETHYLENE GLYCOL 3350 17 G: 17 POWDER, FOR SOLUTION ORAL at 09:03

## 2024-03-19 RX ADMIN — POTASSIUM CHLORIDE 10 MEQ: 10 CAPSULE, COATED, EXTENDED RELEASE ORAL at 06:03

## 2024-03-19 RX ADMIN — SODIUM CHLORIDE 15 MG/HR: 9 INJECTION, SOLUTION INTRAVENOUS at 04:03

## 2024-03-19 RX ADMIN — IPRATROPIUM BROMIDE AND ALBUTEROL SULFATE 3 ML: .5; 3 SOLUTION RESPIRATORY (INHALATION) at 04:03

## 2024-03-19 RX ADMIN — WARFARIN SODIUM 5 MG: 5 TABLET ORAL at 06:03

## 2024-03-19 RX ADMIN — ACETAZOLAMIDE 250 MG: 250 TABLET ORAL at 09:03

## 2024-03-19 RX ADMIN — SODIUM CHLORIDE 15 MG/HR: 9 INJECTION, SOLUTION INTRAVENOUS at 06:03

## 2024-03-19 RX ADMIN — TIOTROPIUM BROMIDE INHALATION SPRAY 2 PUFF: 3.12 SPRAY, METERED RESPIRATORY (INHALATION) at 08:03

## 2024-03-19 RX ADMIN — IPRATROPIUM BROMIDE AND ALBUTEROL SULFATE 3 ML: .5; 3 SOLUTION RESPIRATORY (INHALATION) at 08:03

## 2024-03-19 RX ADMIN — ALLOPURINOL 300 MG: 300 TABLET ORAL at 09:03

## 2024-03-19 RX ADMIN — IPRATROPIUM BROMIDE AND ALBUTEROL SULFATE 3 ML: .5; 3 SOLUTION RESPIRATORY (INHALATION) at 12:03

## 2024-03-19 RX ADMIN — FLUTICASONE FUROATE AND VILANTEROL TRIFENATATE 1 PUFF: 100; 25 POWDER RESPIRATORY (INHALATION) at 08:03

## 2024-03-19 NOTE — PLAN OF CARE
Problem: Adult Inpatient Plan of Care  Goal: Plan of Care Review  Outcome: Ongoing, Progressing  Goal: Patient-Specific Goal (Individualized)  Outcome: Ongoing, Progressing  Goal: Absence of Hospital-Acquired Illness or Injury  Outcome: Ongoing, Progressing  Goal: Optimal Comfort and Wellbeing  Outcome: Ongoing, Progressing  Goal: Readiness for Transition of Care  Outcome: Ongoing, Progressing     Problem: Bariatric Environmental Safety  Goal: Safety Maintained with Care  Outcome: Ongoing, Progressing     Problem: Fluid and Electrolyte Imbalance (Acute Kidney Injury/Impairment)  Goal: Fluid and Electrolyte Balance  Outcome: Ongoing, Progressing     Problem: Oral Intake Inadequate (Acute Kidney Injury/Impairment)  Goal: Optimal Nutrition Intake  Outcome: Ongoing, Progressing

## 2024-03-19 NOTE — ASSESSMENT & PLAN NOTE
Body mass index is 38.23 kg/m². Morbid obesity complicates all aspects of disease management from diagnostic modalities to treatment. Weight loss encouraged and health benefits explained to patient.

## 2024-03-19 NOTE — PROGRESS NOTES
[facility] - Cardiology [facility] Medicine  Progress Note    Patient Name: Yong Mcintyre  MRN: [id number]  Patient Class: IP- Inpatient   Admission Date: 3/15/2024  Length of Stay: 4 days  Attending Physician: Juice Hurtado MD  Primary Care Provider: Gianni Escalona MD        Subjective:     Principal Problem:Acute on chronic heart failure with preserved ejection fraction (HFpEF)        HPI:  Mr. Mcintyre is a 47 y/o male with a PMH of HFrEF, HTN, pHTN, Chronic hypoxic respiratory failure (baseline oxygen req at home is 3 L), PFO (unsuccessful closure in 2006), AF (on coumadin), Obesity hypoventilation syndrome, Morbid obesity presenting to the ED with abdominal bloating, dyspnea for the last 4 days, with weight gain of 10 lbs this week. His dry weight is 231 and this morning was weighing 246.Satting 82% on his home O2 of 3 L, currently satting 94% on 5-6L. Patient reports he has been having this epigastric bloating and associated belching that has been reoccurring. Patient reports that he visited his PCP, and suspected GERD and prescribed him Protonix with little improvement. Patient denies fever, N/V/D, no changes in BM, no increasing passing of gas, no chills, dysuria, flank pain, does endorse orthopnea. Patient does admit to SOB (seems to be chronic but worsens with bloating), belching, and feeling mildly nauseous. Patient has been adherent to medication regimen consisting diuretics and 2 g sodium diet. No new sick contacts. Performs ADL's and tries to works out.       In the ED /64, , RR 18, on 6L oxygen via [place], Labs significant for INR 2.3 (goal 2-3), Na 134, K 2.7, , troponin 0.042, Bun 91, Cr 2.4, PcO2 75.7, HCO3 48.1, he was given 80 lasix in ED with 500cc UOP so far also found to have long Qtc on repeat EKG.     Overview/Hospital Course:  Has been diuresing well on IV lasix drip with intermittent diamox for metabolic alkalosis. Approaching dry weight. Will consult \[facility] [place] [facility]\"" inpatient  due to severe pulmonary hypertension.    Interval History: NAEON, VSS, diuresing well with IV lasix    Review of Systems    Constitutional:  Positive for activity change, fatigue and unexpected weight change.   Respiratory:  Positive for shortness of breath. Negative for cough.    Cardiovascular:  Positive for leg swelling. Negative for chest pain.   Gastrointestinal:  Positive for constipation.   Genitourinary:  Positive for frequency.   Psychiatric/Behavioral:  Negative for agitation, confusion and decreased concentration.    Objective:     Vital Signs (Most Recent):  Temp: 97.6 °F (36.4 °C) (03/19/24 1128)  Pulse: 100 (03/19/24 1128)  Resp: 19 (03/19/24 1128)  BP: 108/72 (03/19/24 1128)  SpO2: 95 % (03/19/24 1300) Vital Signs (24h Range):  Temp:  [97.6 °F (36.4 °C)-98.3 °F (36.8 °C)] 97.6 °F (36.4 °C)  Pulse:  [] 100  Resp:  [18-20] 19  SpO2:  [88 %-99 %] 95 %  BP: ()/(59-72) 108/72     Weight: 104.2 kg (229 lb 11.5 oz)  Body mass index is 38.23 kg/m².    Intake/Output Summary (Last 24 hours) at 3/19/2024 1332  Last data filed at 3/19/2024 0900  Gross per 24 hour   Intake 750 ml   Output 2075 ml   Net -1325 ml         Physical Exam      Constitutional:       Appearance: He is obese.      Comments: Respiratory distress   Cardiovascular:      Rate and Rhythm: Normal rate. Rhythm irregular.      Pulses: Normal pulses.      Heart sounds: Normal heart sounds.   Pulmonary:      Effort: Pulmonary effort is normal. No respiratory distress.      Breath sounds: Rhonchi present. No wheezing or rales.   Abdominal:      General: Bowel sounds are normal.      Palpations: Abdomen is soft.   Musculoskeletal:      Right lower leg: Edema present.      Left lower leg: Edema present.   Skin:     Findings: Bruising and rash present.      Comments: Lower extremities have hyperpigmentation and varicose veins   Neurological:      General: No focal deficit present.      Mental Status: He is alert and oriented to person,  place, and time.   Psychiatric:         Mood and Affect: Mood normal.         Behavior: Behavior normal.         Thought Content: Thought content normal.      Significant Labs: All pertinent labs within the past 24 hours have been reviewed.    Significant Imaging: I have reviewed all pertinent imaging results/findings within the past 24 hours.    Assessment/Plan:      * Acute on chronic heart failure with preserved ejection fraction (HFpEF)  Patient is identified as having Systolic (HFrEF) heart failure that is Acute on chronic. CHF is currently uncontrolled due to Continued edema of extremities and Weight gain of 15 pounds. Latest ECHO performed and demonstrates- Results for orders placed during the hospital encounter of 01/18/24    Echo    Interpretation Summary    Left Ventricle: The left ventricle is normal in size. Normal wall thickness. Normal wall motion. Septal flattening in systole consistent with right ventricular pressure overload. There is normal systolic function. Ejection fraction by visual approximation is 55%. There is normal diastolic function.    Right Ventricle: Severe right ventricular enlargement. Wall thickness is normal. Right ventricle wall motion has global hypokinesis. Systolic function is severely reduced.    Tricuspid Valve: There is moderate to severe regurgitation.    Pulmonary Artery: There is moderate to severe pulmonary hypertension. The estimated pulmonary artery systolic pressure is 67 mmHg.    IVC/SVC: Intermediate venous pressure at 8 mmHg.    Pericardium: There is a trivial effusion.  . Continue I/V lasix drip and monitor clinical status closely. Monitor on telemetry. Patient is on CHF pathway.  Monitor strict Is&Os and daily weights.  Place on fluid restriction of 1.5 L. Cardiology has not been consulted. Continue to stress to patient importance of self efficacy and  on diet for CHF. Last BNP reviewed- and noted below   Recent Labs   Lab 03/15/24  1207   *            Obesity (BMI 30-39.9)  Body mass index is 38.23 kg/m². Morbid obesity complicates all aspects of disease management from diagnostic modalities to treatment. Weight loss encouraged and health benefits explained to patient.         Gout  Continue home allopurinol      Paroxysmal atrial fibrillation  Patient with Paroxysmal (<7 days) atrial fibrillation which is controlled currently.    . Patient is currently in sinus rhythm.[id number] Score: The patient doesn't have any registry metric data available.. Anticoagulation indicated. Anticoagulation done with warfarin  .  -Daily INR     GERD (gastroesophageal reflux disease)  Continue pantoprazole       CKD (chronic kidney disease), stage III  Creatine stable for now. BMP reviewed- noted Estimated Creatinine Clearance: 52.9 mL/min (A) (based on SCr of 1.9 mg/dL (H)). according to latest data. Based on current GFR, CKD stage is stage 3 - GFR 30-59.  Monitor UOP and serial BMP and adjust therapy as needed. Renally dose meds. Avoid nephrotoxic medications and procedures.    Acute on chronic respiratory failure with hypoxia and hypercapnia  Patient with Hypercapnic and Hypoxic Respiratory failure which is Acute on chronic.  he is on home oxygen at 3 LPM. Supplemental oxygen was provided and noted- Oxygen Concentration (%):  [60] 60    .   Signs/symptoms of respiratory failure include- increased work of breathing. Contributing diagnoses includes - CHF and Obesity Hypoventilation Labs and images were reviewed. Patient Has recent ABG, which has been reviewed. Will treat underlying causes and adjust management of respiratory failure as follows-     - diuresis  - inhalers, duonebs  -Inpatient consult to \[facility] [place] [facility]\""    MALLIKA (obstructive sleep apnea)  Patient has a history of MALLIKA and wears Bipap at home. Will continue Bipap qhs.      PFO (patent foramen ovale)        Pulmonary hypertension  Patient has a history of pulmonary hypertension with severe right ventricular enlargement.  Wall thickness is normal. Right ventricle wall motion has global hypokinesis. Systolic function is severely reduced. Metoprolol was held because of pulmonary hypertension.    -Inpatient consult to [facility]        VTE Risk Mitigation (From admission, onward)           Ordered     warfarin (COUMADIN) tablet 5 mg  Daily         03/16/24 0945     IP VTE HIGH RISK PATIENT  Once         03/15/24 1700     Place sequential compression device  Until discontinued         03/15/24 1700                    Discharge Planning   ESTEBAN: 3/20/2024     Code Status: Full Code   Is the patient medically ready for discharge?: No    Reason for patient still in hospital (select all that apply): Treatment  Discharge Plan A: Home                  Montserrat Pollock MD  Department of Hospital Medicine   [facility] - Cardiology Stepdown

## 2024-03-19 NOTE — SUBJECTIVE & OBJECTIVE
Interval History: NAEON, VSS, diuresing well with IV lasix    Review of Systems    Constitutional:  Positive for activity change, fatigue and unexpected weight change.   Respiratory:  Positive for shortness of breath. Negative for cough.    Cardiovascular:  Positive for leg swelling. Negative for chest pain.   Gastrointestinal:  Positive for constipation.   Genitourinary:  Positive for frequency.   Psychiatric/Behavioral:  Negative for agitation, confusion and decreased concentration.    Objective:     Vital Signs (Most Recent):  Temp: 97.6 °F (36.4 °C) (03/19/24 1128)  Pulse: 100 (03/19/24 1128)  Resp: 19 (03/19/24 1128)  BP: 108/72 (03/19/24 1128)  SpO2: 95 % (03/19/24 1300) Vital Signs (24h Range):  Temp:  [97.6 °F (36.4 °C)-98.3 °F (36.8 °C)] 97.6 °F (36.4 °C)  Pulse:  [] 100  Resp:  [18-20] 19  SpO2:  [88 %-99 %] 95 %  BP: ()/(59-72) 108/72     Weight: 104.2 kg (229 lb 11.5 oz)  Body mass index is 38.23 kg/m².    Intake/Output Summary (Last 24 hours) at 3/19/2024 1332  Last data filed at 3/19/2024 0900  Gross per 24 hour   Intake 750 ml   Output 2075 ml   Net -1325 ml         Physical Exam      Constitutional:       Appearance: He is obese.      Comments: Respiratory distress   Cardiovascular:      Rate and Rhythm: Normal rate. Rhythm irregular.      Pulses: Normal pulses.      Heart sounds: Normal heart sounds.   Pulmonary:      Effort: Pulmonary effort is normal. No respiratory distress.      Breath sounds: Rhonchi present. No wheezing or rales.   Abdominal:      General: Bowel sounds are normal.      Palpations: Abdomen is soft.   Musculoskeletal:      Right lower leg: Edema present.      Left lower leg: Edema present.   Skin:     Findings: Bruising and rash present.      Comments: Lower extremities have hyperpigmentation and varicose veins   Neurological:      General: No focal deficit present.      Mental Status: He is alert and oriented to person, place, and time.   Psychiatric:         Mood  and Affect: Mood normal.         Behavior: Behavior normal.         Thought Content: Thought content normal.      Significant Labs: All pertinent labs within the past 24 hours have been reviewed.    Significant Imaging: I have reviewed all pertinent imaging results/findings within the past 24 hours.

## 2024-03-19 NOTE — ASSESSMENT & PLAN NOTE
Creatine stable for now. BMP reviewed- noted Estimated Creatinine Clearance: 52.9 mL/min (A) (based on SCr of 1.9 mg/dL (H)). according to latest data. Based on current GFR, CKD stage is stage 3 - GFR 30-59.  Monitor UOP and serial BMP and adjust therapy as needed. Renally dose meds. Avoid nephrotoxic medications and procedures.

## 2024-03-19 NOTE — PLAN OF CARE
Discharge Planning: ESTEBAN: Wednesday, 3/20/24 to home. Pt's home oxygen is 3 Liters Pt. Currently on 4 Liters. Pt. requesting a rollator upon discharge.     Gordon Echavarria LMSW

## 2024-03-19 NOTE — ASSESSMENT & PLAN NOTE
Patient has a history of pulmonary hypertension with severe right ventricular enlargement. Wall thickness is normal. Right ventricle wall motion has global hypokinesis. Systolic function is severely reduced. Metoprolol was held because of pulmonary hypertension.    -Inpatient consult to [facility]

## 2024-03-19 NOTE — ASSESSMENT & PLAN NOTE
Patient with Hypercapnic and Hypoxic Respiratory failure which is Acute on chronic.  he is on home oxygen at 3 LPM. Supplemental oxygen was provided and noted- Oxygen Concentration (%):  [60] 60    .   Signs/symptoms of respiratory failure include- increased work of breathing. Contributing diagnoses includes - CHF and Obesity Hypoventilation Labs and images were reviewed. Patient Has recent ABG, which has been reviewed. Will treat underlying causes and adjust management of respiratory failure as follows-     - diuresis  - inhalers, duonebs  -Inpatient consult to [facility]

## 2024-03-19 NOTE — ASSESSMENT & PLAN NOTE
Patient with Paroxysmal (<7 days) atrial fibrillation which is controlled currently.    . Patient is currently in sinus rhythm.[id number] Score: The patient doesn't have any registry metric data available.. Anticoagulation indicated. Anticoagulation done with warfarin  .  -Daily INR

## 2024-03-20 LAB
ALBUMIN SERPL BCP-MCNC: 2.9 G/DL (ref 3.5–5.2)
ALP SERPL-CCNC: 120 U/L (ref 55–135)
ALT SERPL W/O P-5'-P-CCNC: 12 U/L (ref 10–44)
ANION GAP SERPL CALC-SCNC: 13 MMOL/L (ref 8–16)
ANION GAP SERPL CALC-SCNC: 13 MMOL/L (ref 8–16)
ANION GAP SERPL CALC-SCNC: 6 MMOL/L (ref 8–16)
ANION GAP SERPL CALC-SCNC: 8 MMOL/L (ref 8–16)
ANION GAP SERPL CALC-SCNC: 8 MMOL/L (ref 8–16)
AST SERPL-CCNC: 39 U/L (ref 10–40)
BASOPHILS # BLD AUTO: 0.05 K/UL (ref 0–0.2)
BASOPHILS NFR BLD: 0.7 % (ref 0–1.9)
BILIRUB SERPL-MCNC: 1.1 MG/DL (ref 0.1–1)
BUN SERPL-MCNC: 58 MG/DL (ref 6–20)
BUN SERPL-MCNC: 59 MG/DL (ref 6–20)
BUN SERPL-MCNC: 64 MG/DL (ref 6–20)
CALCIUM SERPL-MCNC: 9.3 MG/DL (ref 8.7–10.5)
CALCIUM SERPL-MCNC: 9.4 MG/DL (ref 8.7–10.5)
CALCIUM SERPL-MCNC: 9.5 MG/DL (ref 8.7–10.5)
CALCIUM SERPL-MCNC: 9.6 MG/DL (ref 8.7–10.5)
CALCIUM SERPL-MCNC: 9.7 MG/DL (ref 8.7–10.5)
CHLORIDE SERPL-SCNC: 94 MMOL/L (ref 95–110)
CHLORIDE SERPL-SCNC: 95 MMOL/L (ref 95–110)
CHLORIDE SERPL-SCNC: 95 MMOL/L (ref 95–110)
CO2 SERPL-SCNC: 35 MMOL/L (ref 23–29)
CO2 SERPL-SCNC: 35 MMOL/L (ref 23–29)
CO2 SERPL-SCNC: 37 MMOL/L (ref 23–29)
CO2 SERPL-SCNC: 38 MMOL/L (ref 23–29)
CO2 SERPL-SCNC: 38 MMOL/L (ref 23–29)
CREAT SERPL-MCNC: 2.2 MG/DL (ref 0.5–1.4)
CREAT SERPL-MCNC: 2.3 MG/DL (ref 0.5–1.4)
DIFFERENTIAL METHOD BLD: ABNORMAL
EOSINOPHIL # BLD AUTO: 0 K/UL (ref 0–0.5)
EOSINOPHIL NFR BLD: 0.4 % (ref 0–8)
ERYTHROCYTE [DISTWIDTH] IN BLOOD BY AUTOMATED COUNT: 21 % (ref 11.5–14.5)
EST. GFR  (NO RACE VARIABLE): 34.2 ML/MIN/1.73 M^2
EST. GFR  (NO RACE VARIABLE): 36 ML/MIN/1.73 M^2
GLUCOSE SERPL-MCNC: 106 MG/DL (ref 70–110)
GLUCOSE SERPL-MCNC: 106 MG/DL (ref 70–110)
GLUCOSE SERPL-MCNC: 108 MG/DL (ref 70–110)
GLUCOSE SERPL-MCNC: 112 MG/DL (ref 70–110)
GLUCOSE SERPL-MCNC: 112 MG/DL (ref 70–110)
HCT VFR BLD AUTO: 57.4 % (ref 40–54)
HGB BLD-MCNC: 16 G/DL (ref 14–18)
IMM GRANULOCYTES # BLD AUTO: 0.01 K/UL (ref 0–0.04)
IMM GRANULOCYTES NFR BLD AUTO: 0.1 % (ref 0–0.5)
INR PPP: 2 (ref 0.8–1.2)
LYMPHOCYTES # BLD AUTO: 1.2 K/UL (ref 1–4.8)
LYMPHOCYTES NFR BLD: 16.9 % (ref 18–48)
MAGNESIUM SERPL-MCNC: 1.7 MG/DL (ref 1.6–2.6)
MAGNESIUM SERPL-MCNC: 1.7 MG/DL (ref 1.6–2.6)
MAGNESIUM SERPL-MCNC: 1.9 MG/DL (ref 1.6–2.6)
MCH RBC QN AUTO: 25.8 PG (ref 27–31)
MCHC RBC AUTO-ENTMCNC: 27.9 G/DL (ref 32–36)
MCV RBC AUTO: 92 FL (ref 82–98)
MONOCYTES # BLD AUTO: 0.6 K/UL (ref 0.3–1)
MONOCYTES NFR BLD: 8.8 % (ref 4–15)
NEUTROPHILS # BLD AUTO: 5.1 K/UL (ref 1.8–7.7)
NEUTROPHILS NFR BLD: 73.1 % (ref 38–73)
NRBC BLD-RTO: 0 /100 WBC
PHOSPHATE SERPL-MCNC: 5.1 MG/DL (ref 2.7–4.5)
PLATELET # BLD AUTO: 242 K/UL (ref 150–450)
PMV BLD AUTO: 11.2 FL (ref 9.2–12.9)
POTASSIUM SERPL-SCNC: 3.2 MMOL/L (ref 3.5–5.1)
POTASSIUM SERPL-SCNC: 3.3 MMOL/L (ref 3.5–5.1)
POTASSIUM SERPL-SCNC: 3.7 MMOL/L (ref 3.5–5.1)
POTASSIUM SERPL-SCNC: 3.7 MMOL/L (ref 3.5–5.1)
POTASSIUM SERPL-SCNC: 3.9 MMOL/L (ref 3.5–5.1)
PROT SERPL-MCNC: 8.5 G/DL (ref 6–8.4)
PROTHROMBIN TIME: 20.9 SEC (ref 9–12.5)
RBC # BLD AUTO: 6.21 M/UL (ref 4.6–6.2)
SODIUM SERPL-SCNC: 139 MMOL/L (ref 136–145)
SODIUM SERPL-SCNC: 140 MMOL/L (ref 136–145)
SODIUM SERPL-SCNC: 140 MMOL/L (ref 136–145)
SODIUM SERPL-SCNC: 142 MMOL/L (ref 136–145)
SODIUM SERPL-SCNC: 142 MMOL/L (ref 136–145)
WBC # BLD AUTO: 6.92 K/UL (ref 3.9–12.7)

## 2024-03-20 PROCEDURE — 83735 ASSAY OF MAGNESIUM: CPT

## 2024-03-20 PROCEDURE — 20600001 HC STEP DOWN PRIVATE ROOM

## 2024-03-20 PROCEDURE — 99900035 HC TECH TIME PER 15 MIN (STAT)

## 2024-03-20 PROCEDURE — 94660 CPAP INITIATION&MGMT: CPT

## 2024-03-20 PROCEDURE — 94640 AIRWAY INHALATION TREATMENT: CPT

## 2024-03-20 PROCEDURE — 36415 COLL VENOUS BLD VENIPUNCTURE: CPT

## 2024-03-20 PROCEDURE — 25000242 PHARM REV CODE 250 ALT 637 W/ HCPCS

## 2024-03-20 PROCEDURE — 94761 N-INVAS EAR/PLS OXIMETRY MLT: CPT

## 2024-03-20 PROCEDURE — 63600150 PHARM REV CODE 636

## 2024-03-20 PROCEDURE — 27000221 HC OXYGEN, UP TO 24 HOURS

## 2024-03-20 PROCEDURE — 85610 PROTHROMBIN TIME: CPT

## 2024-03-20 PROCEDURE — 85025 COMPLETE CBC W/AUTO DIFF WBC: CPT

## 2024-03-20 PROCEDURE — 80048 BASIC METABOLIC PNL TOTAL CA: CPT | Mod: XB

## 2024-03-20 PROCEDURE — 84100 ASSAY OF PHOSPHORUS: CPT

## 2024-03-20 PROCEDURE — 25000003 PHARM REV CODE 250

## 2024-03-20 PROCEDURE — 83735 ASSAY OF MAGNESIUM: CPT | Mod: 91

## 2024-03-20 PROCEDURE — 63600175 PHARM REV CODE 636 W HCPCS

## 2024-03-20 PROCEDURE — 80053 COMPREHEN METABOLIC PANEL: CPT

## 2024-03-20 RX ORDER — POTASSIUM CHLORIDE 20 MEQ/1
40 TABLET, EXTENDED RELEASE ORAL ONCE
Status: COMPLETED | OUTPATIENT
Start: 2024-03-20 | End: 2024-03-20

## 2024-03-20 RX ORDER — MAGNESIUM SULFATE HEPTAHYDRATE 40 MG/ML
2 INJECTION, SOLUTION INTRAVENOUS ONCE
Status: COMPLETED | OUTPATIENT
Start: 2024-03-20 | End: 2024-03-20

## 2024-03-20 RX ORDER — POTASSIUM CHLORIDE 20 MEQ/1
20 TABLET, EXTENDED RELEASE ORAL ONCE
Status: COMPLETED | OUTPATIENT
Start: 2024-03-20 | End: 2024-03-20

## 2024-03-20 RX ORDER — TORSEMIDE 20 MG/1
60 TABLET ORAL 2 TIMES DAILY
Status: DISCONTINUED | OUTPATIENT
Start: 2024-03-20 | End: 2024-03-21 | Stop reason: HOSPADM

## 2024-03-20 RX ORDER — METOLAZONE 2.5 MG/1
2.5 TABLET ORAL
Status: DISCONTINUED | OUTPATIENT
Start: 2024-03-20 | End: 2024-03-21 | Stop reason: HOSPADM

## 2024-03-20 RX ADMIN — SODIUM CHLORIDE 15 MG/HR: 9 INJECTION, SOLUTION INTRAVENOUS at 07:03

## 2024-03-20 RX ADMIN — POLYETHYLENE GLYCOL 3350 17 G: 17 POWDER, FOR SOLUTION ORAL at 08:03

## 2024-03-20 RX ADMIN — IPRATROPIUM BROMIDE AND ALBUTEROL SULFATE 3 ML: .5; 3 SOLUTION RESPIRATORY (INHALATION) at 08:03

## 2024-03-20 RX ADMIN — METOLAZONE 2.5 MG: 2.5 TABLET ORAL at 12:03

## 2024-03-20 RX ADMIN — POTASSIUM CHLORIDE 40 MEQ: 1500 TABLET, EXTENDED RELEASE ORAL at 09:03

## 2024-03-20 RX ADMIN — WARFARIN SODIUM 5 MG: 5 TABLET ORAL at 05:03

## 2024-03-20 RX ADMIN — IPRATROPIUM BROMIDE AND ALBUTEROL SULFATE 3 ML: .5; 3 SOLUTION RESPIRATORY (INHALATION) at 04:03

## 2024-03-20 RX ADMIN — POTASSIUM CHLORIDE 20 MEQ: 1500 TABLET, EXTENDED RELEASE ORAL at 05:03

## 2024-03-20 RX ADMIN — SENNOSIDES 8.6 MG: 8.6 TABLET, FILM COATED ORAL at 08:03

## 2024-03-20 RX ADMIN — ALLOPURINOL 300 MG: 300 TABLET ORAL at 08:03

## 2024-03-20 RX ADMIN — PANTOPRAZOLE SODIUM 40 MG: 40 TABLET, DELAYED RELEASE ORAL at 08:03

## 2024-03-20 RX ADMIN — TORSEMIDE 60 MG: 20 TABLET ORAL at 12:03

## 2024-03-20 RX ADMIN — POTASSIUM CHLORIDE 20 MEQ: 1500 TABLET, EXTENDED RELEASE ORAL at 07:03

## 2024-03-20 RX ADMIN — FLUTICASONE FUROATE AND VILANTEROL TRIFENATATE 1 PUFF: 100; 25 POWDER RESPIRATORY (INHALATION) at 08:03

## 2024-03-20 RX ADMIN — ACETAZOLAMIDE 250 MG: 250 TABLET ORAL at 08:03

## 2024-03-20 RX ADMIN — TORSEMIDE 60 MG: 20 TABLET ORAL at 08:03

## 2024-03-20 RX ADMIN — MAGNESIUM SULFATE HEPTAHYDRATE 2 G: 40 INJECTION, SOLUTION INTRAVENOUS at 05:03

## 2024-03-20 RX ADMIN — TIOTROPIUM BROMIDE INHALATION SPRAY 2 PUFF: 3.12 SPRAY, METERED RESPIRATORY (INHALATION) at 08:03

## 2024-03-20 NOTE — ASSESSMENT & PLAN NOTE
#Pulmonary hypertension - likely WHO group 3, less likely group 2  - [facility] 01/31/2024 - Severe pulmonary hypertension in setting of normal to mildly elevated left sided filling pressure at rest.   - Patient known by our service with no specific treatments for pulmonary hypertension from [facility] standpoint  - Is following by pulmonology, who he needs to continue managing disease  - Continue volume overload but approaching euvolemia, continue diuresing as per primary team

## 2024-03-20 NOTE — PROGRESS NOTES
[facility] - Cardiology [facility] Medicine  Progress Note    Patient Name: Yong Mcintyre  MRN: [id number]  Patient Class: IP- Inpatient   Admission Date: 3/15/2024  Length of Stay: 5 days  Attending Physician: Juice Hurtado MD  Primary Care Provider: Gianni Escalona MD        Subjective:     Principal Problem:Acute on chronic heart failure with preserved ejection fraction (HFpEF)        HPI:  Mr. Mcintyre is a 47 y/o male with a PMH of HFrEF, HTN, pHTN, Chronic hypoxic respiratory failure (baseline oxygen req at home is 3 L), PFO (unsuccessful closure in 2006), AF (on coumadin), Obesity hypoventilation syndrome, Morbid obesity presenting to the ED with abdominal bloating, dyspnea for the last 4 days, with weight gain of 10 lbs this week. His dry weight is 231 and this morning was weighing 246.Satting 82% on his home O2 of 3 L, currently satting 94% on 5-6L. Patient reports he has been having this epigastric bloating and associated belching that has been reoccurring. Patient reports that he visited his PCP, and suspected GERD and prescribed him Protonix with little improvement. Patient denies fever, N/V/D, no changes in BM, no increasing passing of gas, no chills, dysuria, flank pain, does endorse orthopnea. Patient does admit to SOB (seems to be chronic but worsens with bloating), belching, and feeling mildly nauseous. Patient has been adherent to medication regimen consisting diuretics and 2 g sodium diet. No new sick contacts. Performs ADL's and tries to works out.       In the ED /64, , RR 18, on 6L oxygen via [place], Labs significant for INR 2.3 (goal 2-3), Na 134, K 2.7, , troponin 0.042, Bun 91, Cr 2.4, PcO2 75.7, HCO3 48.1, he was given 80 lasix in ED with 500cc UOP so far also found to have long Qtc on repeat EKG.     Overview/Hospital Course:  Has been diuresing well on IV lasix drip with intermittent diamox for metabolic alkalosis. Approaching dry weight. Will consult [facility] inpatient  due to severe pulmonary hypertension. Transitioned from IV lasix to oral torsemide and metolazone.     Interval History: NAEON, VSS spo2 88-92% on 4 L. Weight today is 229.8 lb    Review of Systems  Constitutional:  Positive for activity change, fatigue and unexpected weight change.   Respiratory:  Negative for cough, shortness of breath.   Cardiovascular:  Leg swelling is improved.  Negative for chest pain.   Gastrointestinal:  Negative for nausea, vomiting, abdominal pain.   Genitourinary:  Positive for frequency.   Psychiatric/Behavioral:  Negative for agitation, confusion and decreased concentration.      Objective:     Vital Signs (Most Recent):  Temp: 98.2 °F (36.8 °C) (03/20/24 0809)  Pulse: 99 (03/20/24 0809)  Resp: 18 (03/20/24 0809)  BP: (!) 90/53 (03/20/24 0752)  SpO2: (!) 91 % (03/20/24 0809) Vital Signs (24h Range):  Temp:  [97.4 °F (36.3 °C)-98.2 °F (36.8 °C)] 98.2 °F (36.8 °C)  Pulse:  [] 99  Resp:  [18-23] 18  SpO2:  [90 %-98 %] 91 %  BP: ()/(48-72) 90/53     Weight: 104.1 kg (229 lb 8 oz)  Body mass index is 38.19 kg/m².    Intake/Output Summary (Last 24 hours) at 3/20/2024 1105  Last data filed at 3/20/2024 0921  Gross per 24 hour   Intake 1124 ml   Output 3750 ml   Net -2626 ml         Physical Exam      Constitutional:       Appearance: He is obese.      Comments: Respiratory distress   Cardiovascular:      Rate and Rhythm: Normal rate. Rhythm irregular.      Pulses: Normal pulses.      Heart sounds: Normal heart sounds.   Pulmonary:      Effort: Pulmonary effort is normal. No respiratory distress.      Breath sounds: clear. No wheezing or rales.   Abdominal:      General: Bowel sounds are normal.      Palpations: Abdomen is soft.   Musculoskeletal:      Right lower leg: Edema (improved)present.      Left lower leg: Edema (improved) present.   Skin:     Findings: Bruising and rash present.      Comments: Lower extremities have hyperpigmentation and varicose veins   Neurological:       General: No focal deficit present.      Mental Status: He is alert and oriented to person, place, and time.   Psychiatric:         Mood and Affect: Mood normal.         Behavior: Behavior normal.         Thought Content: Thought content normal.   Significant Labs: All pertinent labs within the past 24 hours have been reviewed.    Significant Imaging: I have reviewed all pertinent imaging results/findings within the past 24 hours.    Assessment/Plan:      * Acute on chronic heart failure with preserved ejection fraction (HFpEF)  Patient is identified as having Systolic (HFrEF) heart failure that is Acute on chronic. CHF is currently uncontrolled due to Continued edema of extremities and Weight gain of 15 pounds. Latest ECHO performed and demonstrates- Results for orders placed during the hospital encounter of 01/18/24    Echo    Interpretation Summary    Left Ventricle: The left ventricle is normal in size. Normal wall thickness. Normal wall motion. Septal flattening in systole consistent with right ventricular pressure overload. There is normal systolic function. Ejection fraction by visual approximation is 55%. There is normal diastolic function.    Right Ventricle: Severe right ventricular enlargement. Wall thickness is normal. Right ventricle wall motion has global hypokinesis. Systolic function is severely reduced.    Tricuspid Valve: There is moderate to severe regurgitation.    Pulmonary Artery: There is moderate to severe pulmonary hypertension. The estimated pulmonary artery systolic pressure is 67 mmHg.    IVC/SVC: Intermediate venous pressure at 8 mmHg.    Pericardium: There is a trivial effusion.  . Switch I/V lasix to Oral torsemide and metolazone and monitor clinical status closely. Monitor on telemetry. Patient is on CHF pathway.  Monitor strict Is&Os and daily weights.  Place on fluid restriction of 1.5 L. Cardiology has not been consulted. Continue to stress to patient importance of self efficacy and   on diet for CHF. Last BNP reviewed- and noted below   Recent Labs   Lab 03/15/24  1207   *           Obesity (BMI 30-39.9)  Body mass index is 38.19 kg/m². Morbid obesity complicates all aspects of disease management from diagnostic modalities to treatment. Weight loss encouraged and health benefits explained to patient.         Gout  Continue home allopurinol      Paroxysmal atrial fibrillation  Patient with Paroxysmal (<7 days) atrial fibrillation which is controlled currently.    . Patient is currently in sinus rhythm.[id number] Score: The patient doesn't have any registry metric data available.. Anticoagulation indicated. Anticoagulation done with warfarin  .  -Daily INR     GERD (gastroesophageal reflux disease)  Continue pantoprazole       CKD (chronic kidney disease), stage III  Creatine stable for now. BMP reviewed- noted Estimated Creatinine Clearance: 43.6 mL/min (A) (based on SCr of 2.3 mg/dL (H)). according to latest data. Based on current GFR, CKD stage is stage 3 - GFR 30-59.  Monitor UOP and serial BMP and adjust therapy as needed. Renally dose meds. Avoid nephrotoxic medications and procedures.    Acute on chronic respiratory failure with hypoxia and hypercapnia  Patient with Hypercapnic and Hypoxic Respiratory failure which is Acute on chronic.  he is on home oxygen at 3 LPM. Supplemental oxygen was provided and noted-      .   Signs/symptoms of respiratory failure include- increased work of breathing. Contributing diagnoses includes - CHF and Obesity Hypoventilation Labs and images were reviewed. Patient Has recent ABG, which has been reviewed. Will treat underlying causes and adjust management of respiratory failure as follows-     - diuresis  - inhalers, duonebs  -[facility]patient consult to [facility]    MALLIKA (obstructive sleep apnea)  Patient has a history of MALLIKA and wears Bipap at home. Will continue Bipap qhs.      PFO (patent foramen ovale)        Pulmonary hypertension  Patient has a  history of pulmonary hypertension with severe right ventricular enlargement. Wall thickness is normal. Right ventricle wall motion has global hypokinesis. Systolic function is severely reduced. Metoprolol was held because of pulmonary hypertension.    -Inpatient consult to [facility]        VTE Risk Mitigation (From admission, onward)           Ordered     warfarin (COUMADIN) tablet 5 mg  Daily         03/16/24 0945     IP VTE HIGH RISK PATIENT  Once         03/15/24 1700     Place sequential compression device  Until discontinued         03/15/24 1700                    Discharge Planning   ESTEBAN: 3/21/2024     Code Status: Full Code   Is the patient medically ready for discharge?: No    Reason for patient still in hospital (select all that apply): Treatment  Discharge Plan A: Home                  Montserrat Pollock MD  Department of Hospital Medicine   [facility] - Cardiology Stepdown

## 2024-03-20 NOTE — ASSESSMENT & PLAN NOTE
Patient with Paroxysmal (<7 days) atrial fibrillation which is controlled currently.    . Patient is currently in sinus rhythm.[id number] Score: The patient doesn't have any registry metric data available.. Anticoagulation indicated. Anticoagulation done with warfarin  .  -Daily INR

## 2024-03-20 NOTE — PLAN OF CARE
Problem: Adult Inpatient Plan of Care  Goal: Plan of Care Review  Outcome: Ongoing, Progressing  Flowsheets (Taken 3/20/2024 6494)  Plan of Care Reviewed With: patient  Goal: Patient-Specific Goal (Individualized)  Outcome: Ongoing, Progressing  Goal: Absence of Hospital-Acquired Illness or Injury  Outcome: Ongoing, Progressing  Goal: Optimal Comfort and Wellbeing  Outcome: Ongoing, Progressing  Goal: Readiness for Transition of Care  Outcome: Ongoing, Progressing     Problem: Adult Inpatient Plan of Care  Goal: Patient-Specific Goal (Individualized)  Outcome: Ongoing, Progressing     Problem: Bariatric Environmental Safety  Goal: Safety Maintained with Care  Outcome: Ongoing, Progressing

## 2024-03-20 NOTE — ASSESSMENT & PLAN NOTE
Patient is identified as having Systolic (HFrEF) heart failure that is Acute on chronic. CHF is currently uncontrolled due to Continued edema of extremities and Weight gain of 15 pounds. Latest ECHO performed and demonstrates- Results for orders placed during the hospital encounter of 01/18/24    Echo    Interpretation Summary    Left Ventricle: The left ventricle is normal in size. Normal wall thickness. Normal wall motion. Septal flattening in systole consistent with right ventricular pressure overload. There is normal systolic function. Ejection fraction by visual approximation is 55%. There is normal diastolic function.    Right Ventricle: Severe right ventricular enlargement. Wall thickness is normal. Right ventricle wall motion has global hypokinesis. Systolic function is severely reduced.    Tricuspid Valve: There is moderate to severe regurgitation.    Pulmonary Artery: There is moderate to severe pulmonary hypertension. The estimated pulmonary artery systolic pressure is 67 mmHg.    IVC/SVC: Intermediate venous pressure at 8 mmHg.    Pericardium: There is a trivial effusion.  . Switch I/V lasix to Oral torsemide and metolazone and monitor clinical status closely. Monitor on telemetry. Patient is on CHF pathway.  Monitor strict Is&Os and daily weights.  Place on fluid restriction of 1.5 L. Cardiology has not been consulted. Continue to stress to patient importance of self efficacy and  on diet for CHF. Last BNP reviewed- and noted below   Recent Labs   Lab 03/15/24  1207   *

## 2024-03-20 NOTE — HPI
49yo M patient with HFpEF, pickwickian syndrome, HTN, pHTN, chronic hypoxic respiratory failure (baseline O2 3L), PFO (unsuccessful closure in 2006), Afib (on warfarin for unknown reason), who was admitted to [facility] on 03/15/2024 with acute heart failure in the setting of pulmonary hypertension. Since admission primary team have succesfully diuresed the patient to almost baseline. Patient has had multiple admissions in the last months for acute heart failure (02/2024, 12/2023, 05/2023, 03/2023). Given multiple admissions, and as patient has not been able to start any treatment as outpatient, [facility] was consulted for initiation of treatment of pulmonary hypertension.     Since admission he was given furosemide gtt at 15mg/h and acetazolamide 250mg qd. Last 24h In 0.440, Out 3.1, net -2.05L, since admission -11.660L. Team changed furosemide gtt to torsemide 60mg bid as per home regimen.     MAP 59-84 (last hours MAP>65) and HR . Labs show WBC 6.92, Hb 16, Plt 242, INR 2, Cr 2.3 (admission 2.2, decreased to 1.9, and now back). Other relevant labs include admission , trop 0.042 and lactic acid of 0.9.     Last RHC on 01/31/2024 showed mostly severe pre capillary pulmonary hypertension at rest, likely WHO Group 3; With exercise, did see some increase in left sided filling pressure however did not completely reach above mpcwp 25 mm Hg wth exercise.    TTE on this admission 03/17/2024 showed normal EF 60%, severly enlarged RV with severly reduced systolic function, severly dilated RA, mod-severe TR, severe pHTN (estimated PASP 88mmHg) and IVC 15mmHg.

## 2024-03-20 NOTE — SUBJECTIVE & OBJECTIVE
"Past Medical History:   Diagnosis Date    Arthritis     CHF (congestive heart failure)     Diastolic dysfunction    Cor pulmonale 11/05/2012    Gallstones     Hypertension     Morbid obesity 11/05/2012    Obesity hypoventilation syndrome     On home oxygen therapy 03/07/2014    MALLIKA (obstructive sleep apnea)     BPAP 16/11 with 3 L at night (continuous O2).    Paroxysmal atrial fibrillation     PFO (patent foramen ovale) 11/05/2012    status post closure    Pickwickian syndrome 11/05/2012    Pulmonary hypertension        Past Surgical History:   Procedure Laterality Date    RIGHT HEART CATHETERIZATION Right 3/22/2022    Procedure: INSERTION, CATHETER, RIGHT HEART;  Surgeon: Tony Martínez MD;  Location: [facility] CATH LAB;  Service: Cardiology;  Laterality: Right;    RIGHT HEART CATHETERIZATION Right 1/31/2024    Procedure: INSERTION, CATHETER, RIGHT HEART;  Surgeon: Sheri Ritter MD;  Location: [facility] CATH LAB;  Service: Cardiology;  Laterality: Right;       Review of patient's allergies indicates:   Allergen Reactions    Lipitor [atorvastatin] Other (See Comments)     "it makes my legs feel like jelly"  Other reaction(s): causes legs to hurt       Current Facility-Administered Medications   Medication    acetaminophen tablet 650 mg    acetaZOLAMIDE tablet 250 mg    albuterol-ipratropium 2.5 mg-0.5 mg/3 mL nebulizer solution 3 mL    allopurinoL tablet 300 mg    aluminum-magnesium hydroxide-simethicone 200-200-20 mg/5 mL suspension 30 mL    dextrose 10% bolus 125 mL 125 mL    dextrose 10% bolus 250 mL 250 mL    fluticasone furoate-vilanteroL 100-25 mcg/dose diskus inhaler 1 puff    glucagon (human recombinant) injection 1 mg    glucose chewable tablet 16 g    glucose chewable tablet 24 g    melatonin tablet 6 mg    naloxone 0.4 mg/mL injection 0.02 mg    pantoprazole EC tablet 40 mg    polyethylene glycol packet 17 g    senna tablet 8.6 mg    sodium chloride 0.9% flush 10 mL    sodium chloride 0.9% flush 10 mL    " tiotropium bromide 2.5 mcg/actuation inhaler 2 puff    torsemide tablet 60 mg    warfarin (COUMADIN) tablet 5 mg     Family History       Problem Relation (Age of Onset)    Arthritis Mother, Maternal Grandmother, Maternal Grandfather    Asthma Mother, Sister, Maternal Grandmother    Breast cancer Paternal Grandmother    Diabetes Maternal Grandmother    Down syndrome Brother    Gout Brother    Hypertension Father, Maternal Grandmother, Maternal Grandfather, Paternal Grandfather          Tobacco Use    Smoking status: Never    Smokeless tobacco: Never   Substance and Sexual Activity    Alcohol use: Yes     Comment: holidays  rare    Drug use: No    Sexual activity: Not Currently     Partners: Female     Review of Systems   Constitutional: Negative.    HENT: Negative.     Eyes: Negative.    Respiratory:  Positive for cough and shortness of breath.    Cardiovascular:  Positive for leg swelling.        Orthopnea and PND   Gastrointestinal:  Positive for abdominal distention.   Endocrine: Negative.    Genitourinary: Negative.    Musculoskeletal: Negative.    Neurological: Negative.    Hematological: Negative.      Objective:     Vital Signs (Most Recent):  Temp: 98.2 °F (36.8 °C) (03/20/24 0809)  Pulse: 99 (03/20/24 0809)  Resp: 18 (03/20/24 0809)  BP: (!) 90/53 (03/20/24 0752)  SpO2: (!) 91 % (03/20/24 0809) Vital Signs (24h Range):  Temp:  [97.4 °F (36.3 °C)-98.2 °F (36.8 °C)] 98.2 °F (36.8 °C)  Pulse:  [] 99  Resp:  [18-23] 18  SpO2:  [90 %-98 %] 91 %  BP: ()/(48-72) 90/53     Patient Vitals for the past 72 hrs (Last 3 readings):   Weight   03/20/24 0733 104.1 kg (229 lb 8 oz)   03/19/24 0726 104.2 kg (229 lb 11.5 oz)   03/18/24 0800 106.6 kg (235 lb 0.2 oz)     Body mass index is 38.19 kg/m².      Intake/Output Summary (Last 24 hours) at 3/20/2024 1103  Last data filed at 3/20/2024 0921  Gross per 24 hour   Intake 1124 ml   Output 3750 ml   Net -2626 ml     Telemetry: Artifact most of the time     Physical  Exam  Vitals and nursing note reviewed.   Constitutional:       General: He is not in acute distress.     Appearance: Normal appearance. He is obese. He is not ill-appearing.   HENT:      Head: Normocephalic and atraumatic.   Eyes:      Pupils: Pupils are equal, round, and reactive to light.   Cardiovascular:      Rate and Rhythm: Normal rate and regular rhythm.      Pulses: Normal pulses.      Heart sounds: Normal heart sounds. No murmur heard.     No friction rub. No gallop.      Comments: JVD(+)  Pulmonary:      Effort: Pulmonary effort is normal. No respiratory distress.      Breath sounds: Rales (Lower lobes) present. No wheezing.   Abdominal:      General: Abdomen is flat. Bowel sounds are normal.      Palpations: Abdomen is soft.      Comments: Central obesity   Musculoskeletal:         General: Swelling present. Normal range of motion.      Cervical back: Normal range of motion and neck supple.      Right lower leg: Edema present.      Left lower leg: Edema present.   Skin:     General: Skin is warm and dry.      Capillary Refill: Capillary refill takes less than 2 seconds.   Neurological:      General: No focal deficit present.      Mental Status: He is alert.            Significant Labs:  CBC:  Recent Labs   Lab 03/18/24  0427 03/18/24  1835 03/19/24  0435 03/20/24  0542   WBC 8.29  --  7.41 6.92   RBC 5.94  --  5.95 6.21*   HGB 15.4  --  15.3 16.0   HCT 53.7 61* 54.1* 57.4*     --  205 242   MCV 90  --  91 92   MCH 25.9*  --  25.7* 25.8*   MCHC 28.7*  --  28.3* 27.9*     BNP:  Recent Labs   Lab 03/15/24  1207   *     CMP:  Recent Labs   Lab 03/18/24  0427 03/18/24  1123 03/19/24  0435 03/19/24  1154 03/19/24  1748 03/19/24  2318 03/20/24  0542   GLU 88  88   < > 88   < > 112* 112* 108  106   CALCIUM 9.7  9.6   < > 9.1   < > 9.6 9.4 9.5  9.6   ALBUMIN 2.8*  --  2.9*  --   --   --  2.9*   PROT 7.6  --  7.3  --   --   --  8.5*     138   < > 140   < > 142 140 142  142   K 3.3*  3.2*  "  < > 3.7   < > 4.1 3.3* 3.2*  3.7   CO2 39*  40*   < > 35*   < > 41* 38* 35*  35*   CL 88*  89*   < > 94*   < > 92* 94* 94*  94*   BUN 75*  76*   < > 69*   < > 64* 64* 58*  59*   CREATININE 2.1*  2.0*   < > 2.1*   < > 2.6* 2.2* 2.2*  2.3*   ALKPHOS 114  --  116  --   --   --  120   ALT 13  --  11  --   --   --  12   AST 19  --  19  --   --   --  39   BILITOT 1.6*  --  1.2*  --   --   --  1.1*    < > = values in this interval not displayed.      Coagulation:   Recent Labs   Lab 03/18/24  0427 03/19/24  0435 03/20/24  0542   INR 1.9* 1.9* 2.0*     LDH:  No results for input(s): "LDH" in the last 72 hours.  Microbiology:  Microbiology Results (last 7 days)       ** No results found for the last 168 hours. **            I have reviewed all pertinent labs within the past 24 hours.    Diagnostic Results:  I have reviewed and interpreted all pertinent imaging results/findings within the past 24 hours.    EKG 03/15/2024  - NSR with PVC, right axis deviation, right atrial enlargemnt, nonspecific ST-T wave changes    TTE 03/17/2024    Left Ventricle: The left ventricle is normal in size. Septal flattening in diastole consistent with right ventricular volume overload. There is normal systolic function. Ejection fraction by visual approximation is 60%.    Right Ventricle: Severe right ventricular enlargement. Systolic function is severely reduced. See text for details.    Right Atrium: Right atrium is severely dilated.    Tricuspid Valve: There is moderate to severe regurgitation.    Pulmonary Artery: There is severe pulmonary hypertension. The estimated pulmonary artery systolic pressure is 88 mmHg.    IVC/SVC: Elevated venous pressure at 15 mmHg.    RHC 01/31/2024    The estimated blood loss was <50 mL.    Mostly severe pre capillary pulmonary hypertension at rest, likely WHO Group 3 related. With exercise, did see some increase in left sided filling pressure however did not completely reach above mpcwp 25 mm Hg wt " exercise. will send copy of this RHC to Dr. Child.    Patient not on pulmonary vasodilator therapy at this time.    RA: 18/ 15/ 14 RV: 84/ 5/ 15 PA: 80/ 38/ 50 PWP: 19/ 18/ 15 .   Cardiac output was 3.74 by Taif. Cardiac index is 1.78 L/min/m2.       Thermodilution CO/CI 6.14/2.92 at rest  Likely reduced CO/CI based on Atif and in setting of moderate to severe tricuspid regurgitation.   Moderately elevated right and mildly elevated left sided filling pressure.   Severe pulmonary hypertension in setting of normal to mildly elevated left sided filling pressure at rest.   TPG 35 PVR 10   SVR 1610 at rest based on Atif calculation   MAP 81    With exercise PA pressures increased to 92/50, mean 60  PCWP increased to 25/26, 24.   Blood pressure 130/87, mean 100.   Were able to get one Atif with exercise which led to 8.12/3.86, however immediately decreased within a minute or two of stopping exercise. Oxygen saturation remained the same.

## 2024-03-20 NOTE — CONSULTS
[facility] - Cardiology Stepdown  Heart Transplant  Consult Note    Patient Name: Yong Mcintyre  MRN: [id number]  Admission Date: 3/15/2024  Hospital Length of Stay: 5 days  Attending Physician: Juice Hurtado MD  Primary Care Provider: Gianni Escalona MD   Principal Problem:Acute on chronic heart failure with preserved ejection fraction (HFpEF)    Inpatient consult to Heart Transplant  Consult performed by: Terrence Parisi MD  Consult ordered by: Montserrat Pollock MD        Subjective:     History of Present Illness:  49yo M patient with HFpEF, pickwickian syndrome, HTN, pHTN, chronic hypoxic respiratory failure (baseline O2 3L), PFO (unsuccessful closure in 2006), Afib (on warfarin for unknown reason), who was admitted to [facility] on 03/15/2024 with acute heart failure in the setting of pulmonary hypertension. Since admission primary team have succesfully diuresed the patient to almost baseline. Patient has had multiple admissions in the last months for acute heart failure (02/2024, 12/2023, 05/2023, 03/2023). Given multiple admissions, and as patient has not been able to start any treatment as outpatient, [facility] was consulted for initiation of treatment of pulmonary hypertension.     Since admission he was given furosemide gtt at 15mg/h and acetazolamide 250mg qd. Last 24h In 0.440, Out 3.1, net -2.05L, since admission -11.660L. Team changed furosemide gtt to torsemide 60mg bid as per home regimen.     MAP 59-84 (last hours MAP>65) and HR . Labs show WBC 6.92, Hb 16, Plt 242, INR 2, Cr 2.3 (admission 2.2, decreased to 1.9, and now back). Other relevant labs include admission , trop 0.042 and lactic acid of 0.9.     Last RHC on 01/31/2024 showed mostly severe pre capillary pulmonary hypertension at rest, likely WHO Group 3; With exercise, did see some increase in left sided filling pressure however did not completely reach above mpcwp 25 mm Hg wth exercise.    TTE on this admission 03/17/2024  "showed normal EF 60%, severly enlarged RV with severly reduced systolic function, severly dilated RA, mod-severe TR, severe pHTN (estimated PASP 88mmHg) and IVC 15mmHg.        Past Medical History:   Diagnosis Date    Arthritis     CHF (congestive heart failure)     Diastolic dysfunction    Cor pulmonale 11/05/2012    Gallstones     Hypertension     Morbid obesity 11/05/2012    Obesity hypoventilation syndrome     On home oxygen therapy 03/07/2014    MALLIKA (obstructive sleep apnea)     BPAP 16/11 with 3 L at night (continuous O2).    Paroxysmal atrial fibrillation     PFO (patent foramen ovale) 11/05/2012    status post closure    Pickwickian syndrome 11/05/2012    Pulmonary hypertension        Past Surgical History:   Procedure Laterality Date    RIGHT HEART CATHETERIZATION Right 3/22/2022    Procedure: INSERTION, CATHETER, RIGHT HEART;  Surgeon: Tony Martínez MD;  Location: [facility] CATH LAB;  Service: Cardiology;  Laterality: Right;    RIGHT HEART CATHETERIZATION Right 1/31/2024    Procedure: INSERTION, CATHETER, RIGHT HEART;  Surgeon: Sheri Ritter MD;  Location: [facility] CATH LAB;  Service: Cardiology;  Laterality: Right;       Review of patient's allergies indicates:   Allergen Reactions    Lipitor [atorvastatin] Other (See Comments)     "it makes my legs feel like jelly"  Other reaction(s): causes legs to hurt       Current Facility-Administered Medications   Medication    acetaminophen tablet 650 mg    acetaZOLAMIDE tablet 250 mg    albuterol-ipratropium 2.5 mg-0.5 mg/3 mL nebulizer solution 3 mL    allopurinoL tablet 300 mg    aluminum-magnesium hydroxide-simethicone 200-200-20 mg/5 mL suspension 30 mL    dextrose 10% bolus 125 mL 125 mL    dextrose 10% bolus 250 mL 250 mL    fluticasone furoate-vilanteroL 100-25 mcg/dose diskus inhaler 1 puff    glucagon (human recombinant) injection 1 mg    glucose chewable tablet 16 g    glucose chewable tablet 24 g    melatonin tablet 6 mg    naloxone 0.4 mg/mL injection 0.02 " mg    pantoprazole EC tablet 40 mg    polyethylene glycol packet 17 g    senna tablet 8.6 mg    sodium chloride 0.9% flush 10 mL    sodium chloride 0.9% flush 10 mL    tiotropium bromide 2.5 mcg/actuation inhaler 2 puff    torsemide tablet 60 mg    warfarin (COUMADIN) tablet 5 mg     Family History       Problem Relation (Age of Onset)    Arthritis Mother, Maternal Grandmother, Maternal Grandfather    Asthma Mother, Sister, Maternal Grandmother    Breast cancer Paternal Grandmother    Diabetes Maternal Grandmother    Down syndrome Brother    Gout Brother    Hypertension Father, Maternal Grandmother, Maternal Grandfather, Paternal Grandfather          Tobacco Use    Smoking status: Never    Smokeless tobacco: Never   Substance and Sexual Activity    Alcohol use: Yes     Comment: holidays  rare    Drug use: No    Sexual activity: Not Currently     Partners: Female     Review of Systems   Constitutional: Negative.    HENT: Negative.     Eyes: Negative.    Respiratory:  Positive for cough and shortness of breath.    Cardiovascular:  Positive for leg swelling.        Orthopnea and PND   Gastrointestinal:  Positive for abdominal distention.   Endocrine: Negative.    Genitourinary: Negative.    Musculoskeletal: Negative.    Neurological: Negative.    Hematological: Negative.      Objective:     Vital Signs (Most Recent):  Temp: 98.2 °F (36.8 °C) (03/20/24 0809)  Pulse: 99 (03/20/24 0809)  Resp: 18 (03/20/24 0809)  BP: (!) 90/53 (03/20/24 0752)  SpO2: (!) 91 % (03/20/24 0809) Vital Signs (24h Range):  Temp:  [97.4 °F (36.3 °C)-98.2 °F (36.8 °C)] 98.2 °F (36.8 °C)  Pulse:  [] 99  Resp:  [18-23] 18  SpO2:  [90 %-98 %] 91 %  BP: ()/(48-72) 90/53     Patient Vitals for the past 72 hrs (Last 3 readings):   Weight   03/20/24 0733 104.1 kg (229 lb 8 oz)   03/19/24 0726 104.2 kg (229 lb 11.5 oz)   03/18/24 0800 106.6 kg (235 lb 0.2 oz)     Body mass index is 38.19 kg/m².      Intake/Output Summary (Last 24 hours) at  3/20/2024 1103  Last data filed at 3/20/2024 0921  Gross per 24 hour   Intake 1124 ml   Output 3750 ml   Net -2626 ml     Telemetry: Artifact most of the time     Physical Exam  Vitals and nursing note reviewed.   Constitutional:       General: He is not in acute distress.     Appearance: Normal appearance. He is obese. He is not ill-appearing.   HENT:      Head: Normocephalic and atraumatic.   Eyes:      Pupils: Pupils are equal, round, and reactive to light.   Cardiovascular:      Rate and Rhythm: Normal rate and regular rhythm.      Pulses: Normal pulses.      Heart sounds: Normal heart sounds. No murmur heard.     No friction rub. No gallop.      Comments: JVD(+)  Pulmonary:      Effort: Pulmonary effort is normal. No respiratory distress.      Breath sounds: Rales (Lower lobes) present. No wheezing.   Abdominal:      General: Abdomen is flat. Bowel sounds are normal.      Palpations: Abdomen is soft.      Comments: Central obesity   Musculoskeletal:         General: Swelling present. Normal range of motion.      Cervical back: Normal range of motion and neck supple.      Right lower leg: Edema present.      Left lower leg: Edema present.   Skin:     General: Skin is warm and dry.      Capillary Refill: Capillary refill takes less than 2 seconds.   Neurological:      General: No focal deficit present.      Mental Status: He is alert.            Significant Labs:  CBC:  Recent Labs   Lab 03/18/24  0427 03/18/24  1835 03/19/24  0435 03/20/24  0542   WBC 8.29  --  7.41 6.92   RBC 5.94  --  5.95 6.21*   HGB 15.4  --  15.3 16.0   HCT 53.7 61* 54.1* 57.4*     --  205 242   MCV 90  --  91 92   MCH 25.9*  --  25.7* 25.8*   MCHC 28.7*  --  28.3* 27.9*     BNP:  Recent Labs   Lab 03/15/24  1207   *     CMP:  Recent Labs   Lab 03/18/24  0427 03/18/24  1123 03/19/24  0435 03/19/24  1154 03/19/24  1748 03/19/24  2318 03/20/24  0542   GLU 88  88   < > 88   < > 112* 112* 108  106   CALCIUM 9.7  9.6   < > 9.1    "< > 9.6 9.4 9.5  9.6   ALBUMIN 2.8*  --  2.9*  --   --   --  2.9*   PROT 7.6  --  7.3  --   --   --  8.5*     138   < > 140   < > 142 140 142  142   K 3.3*  3.2*   < > 3.7   < > 4.1 3.3* 3.2*  3.7   CO2 39*  40*   < > 35*   < > 41* 38* 35*  35*   CL 88*  89*   < > 94*   < > 92* 94* 94*  94*   BUN 75*  76*   < > 69*   < > 64* 64* 58*  59*   CREATININE 2.1*  2.0*   < > 2.1*   < > 2.6* 2.2* 2.2*  2.3*   ALKPHOS 114  --  116  --   --   --  120   ALT 13  --  11  --   --   --  12   AST 19  --  19  --   --   --  39   BILITOT 1.6*  --  1.2*  --   --   --  1.1*    < > = values in this interval not displayed.      Coagulation:   Recent Labs   Lab 03/18/24  0427 03/19/24  0435 03/20/24  0542   INR 1.9* 1.9* 2.0*     LDH:  No results for input(s): "LDH" in the last 72 hours.  Microbiology:  Microbiology Results (last 7 days)       ** No results found for the last 168 hours. **            I have reviewed all pertinent labs within the past 24 hours.    Diagnostic Results:  I have reviewed and interpreted all pertinent imaging results/findings within the past 24 hours.    EKG 03/15/2024  - NSR with PVC, right axis deviation, right atrial enlargemnt, nonspecific ST-T wave changes    TTE 03/17/2024    Left Ventricle: The left ventricle is normal in size. Septal flattening in diastole consistent with right ventricular volume overload. There is normal systolic function. Ejection fraction by visual approximation is 60%.    Right Ventricle: Severe right ventricular enlargement. Systolic function is severely reduced. See text for details.    Right Atrium: Right atrium is severely dilated.    Tricuspid Valve: There is moderate to severe regurgitation.    Pulmonary Artery: There is severe pulmonary hypertension. The estimated pulmonary artery systolic pressure is 88 mmHg.    IVC/SVC: Elevated venous pressure at 15 mmHg.    [facility] 01/31/2024    The estimated blood loss was <50 mL.    Mostly severe pre capillary pulmonary " hypertension at rest, likely WHO Group 3 related. With exercise, did see some increase in left sided filling pressure however did not completely reach above mpcwp 25 mm Hg wth exercise. will send copy of this RHC to Dr. Child.    Patient not on pulmonary vasodilator therapy at this time.    RA: 18/ 15/ 14 RV: 84/ 5/ 15 PA: 80/ 38/ 50 PWP: 19/ 18/ 15 .   Cardiac output was 3.74 by Atif. Cardiac index is 1.78 L/min/m2.       Thermodilution CO/CI 6.14/2.92 at rest  Likely reduced CO/CI based on Atif and in setting of moderate to severe tricuspid regurgitation.   Moderately elevated right and mildly elevated left sided filling pressure.   Severe pulmonary hypertension in setting of normal to mildly elevated left sided filling pressure at rest.   TPG 35 PVR 10   SVR 1610 at rest based on Atif calculation   MAP 81    With exercise PA pressures increased to 92/50, mean 60  PCWP increased to 25/26, 24.   Blood pressure 130/87, mean 100.   Were able to get one Atif with exercise which led to 8.12/3.86, however immediately decreased within a minute or two of stopping exercise. Oxygen saturation remained the same.     Assessment/Plan:     Pulmonary hypertension  #Pulmonary hypertension - likely WHO group 3, less likely group 2  - RHC 01/31/2024 - Severe pulmonary hypertension in setting of normal to mildly elevated left sided filling pressure at rest.   - Patient follows with pulmonology at [facility], not currently on PH treatment as etiology likely 3/2  - Will need to follow with primary team for pHTN at [facility] (pulmonology) for treatment other options  - No additional specialized treatment at [facility] from HTS standpoint  - Continue diuresis as per primary team for euvolemia      Thank you for your consult. I will sign off. Please contact us if you have any additional questions.    Terrence Ramos MD  Heart Transplant  [facility] - Cardiology Stepdown

## 2024-03-20 NOTE — ASSESSMENT & PLAN NOTE
Patient with Hypercapnic and Hypoxic Respiratory failure which is Acute on chronic.  he is on home oxygen at 3 LPM. Supplemental oxygen was provided and noted-      .   Signs/symptoms of respiratory failure include- increased work of breathing. Contributing diagnoses includes - CHF and Obesity Hypoventilation Labs and images were reviewed. Patient Has recent ABG, which has been reviewed. Will treat underlying causes and adjust management of respiratory failure as follows-     - diuresis  - inhalers, duonebs  -Inpatient consult to HTS

## 2024-03-20 NOTE — PLAN OF CARE
"   03/20/24 1539   Discharge Reassessment   Assessment Type Discharge Planning Reassessment   Did the patient's condition or plan change since previous assessment? No   Discharge Plan discussed with: Patient   Communicated ESTEBAN with patient/caregiver Date not available/Unable to determine   Discharge Plan A Home;Home Health   Discharge Plan B Home   DME Needed Upon Discharge    (patient requesting a rollator and not a rolling walker upon discharge.)   Why the patient remains in the hospital Requires continued medical care     CM spoke with the patient and he stated he was current with HH but could not remember the name. Upon review of chart , patient was current with [facility]. Patient wants to continue with [facility] st time of discharge . Patient stated he" has oxygen at home and CPCP and BI-PAP and they all the same".  No other concerns voiced per patient.     Damion Jarvis RN    [phone]    Discharge Plan A and Plan B have been determined by review of patient's clinical status, future medical and therapeutic needs, and coverage/benefits for post-acute care in coordination with multidisciplinary team members.    "

## 2024-03-20 NOTE — ASSESSMENT & PLAN NOTE
Creatine stable for now. BMP reviewed- noted Estimated Creatinine Clearance: 43.6 mL/min (A) (based on SCr of 2.3 mg/dL (H)). according to latest data. Based on current GFR, CKD stage is stage 3 - GFR 30-59.  Monitor UOP and serial BMP and adjust therapy as needed. Renally dose meds. Avoid nephrotoxic medications and procedures.

## 2024-03-20 NOTE — PLAN OF CARE
CHW met with patient/family at bedside. Patient experience rounding completed and reviewed the following.     Do you know your discharge plan? Yes or No,    If yes, what is the plan? (Home, Home Health, Rehab, SNF, LTAC, or Other) Yes Home w/ HH    Have you discussed your needs and preferences with your SW/CM? Yes or No  Yes Home w/ HH    If you are discharging home, do you have help at home? Yes or No Yes (family)    Do you think you will need help additional at home at discharge? Yes or No  No    Do you currently have difficulty keeping up with bills, affording medicine or buying food? Yes or No No    Assigned SW/CM notified of any patient/family needs or concerns. Appropriate resources provided to address patient's needs.  Damion Lorenzana CHW  Case Management  [phone]

## 2024-03-20 NOTE — ASSESSMENT & PLAN NOTE
Body mass index is 38.19 kg/m². Morbid obesity complicates all aspects of disease management from diagnostic modalities to treatment. Weight loss encouraged and health benefits explained to patient.

## 2024-03-21 VITALS
TEMPERATURE: 98 F | HEART RATE: 100 BPM | BODY MASS INDEX: 37.65 KG/M2 | SYSTOLIC BLOOD PRESSURE: 110 MMHG | WEIGHT: 226 LBS | RESPIRATION RATE: 20 BRPM | HEIGHT: 65 IN | OXYGEN SATURATION: 93 % | DIASTOLIC BLOOD PRESSURE: 67 MMHG

## 2024-03-21 LAB
ALBUMIN SERPL BCP-MCNC: 3.1 G/DL (ref 3.5–5.2)
ALP SERPL-CCNC: 131 U/L (ref 55–135)
ALT SERPL W/O P-5'-P-CCNC: 11 U/L (ref 10–44)
ANION GAP SERPL CALC-SCNC: 11 MMOL/L (ref 8–16)
ANION GAP SERPL CALC-SCNC: 12 MMOL/L (ref 8–16)
ANION GAP SERPL CALC-SCNC: 9 MMOL/L (ref 8–16)
AST SERPL-CCNC: 21 U/L (ref 10–40)
BASOPHILS # BLD AUTO: 0.06 K/UL (ref 0–0.2)
BASOPHILS NFR BLD: 0.7 % (ref 0–1.9)
BILIRUB SERPL-MCNC: 1.5 MG/DL (ref 0.1–1)
BUN SERPL-MCNC: 56 MG/DL (ref 6–20)
BUN SERPL-MCNC: 57 MG/DL (ref 6–20)
BUN SERPL-MCNC: 58 MG/DL (ref 6–20)
CALCIUM SERPL-MCNC: 10 MG/DL (ref 8.7–10.5)
CALCIUM SERPL-MCNC: 10 MG/DL (ref 8.7–10.5)
CALCIUM SERPL-MCNC: 9.7 MG/DL (ref 8.7–10.5)
CHLORIDE SERPL-SCNC: 93 MMOL/L (ref 95–110)
CHLORIDE SERPL-SCNC: 94 MMOL/L (ref 95–110)
CHLORIDE SERPL-SCNC: 95 MMOL/L (ref 95–110)
CO2 SERPL-SCNC: 33 MMOL/L (ref 23–29)
CO2 SERPL-SCNC: 34 MMOL/L (ref 23–29)
CO2 SERPL-SCNC: 36 MMOL/L (ref 23–29)
CREAT SERPL-MCNC: 2 MG/DL (ref 0.5–1.4)
CREAT SERPL-MCNC: 2.1 MG/DL (ref 0.5–1.4)
CREAT SERPL-MCNC: 2.3 MG/DL (ref 0.5–1.4)
DIFFERENTIAL METHOD BLD: ABNORMAL
EOSINOPHIL # BLD AUTO: 0.1 K/UL (ref 0–0.5)
EOSINOPHIL NFR BLD: 0.6 % (ref 0–8)
ERYTHROCYTE [DISTWIDTH] IN BLOOD BY AUTOMATED COUNT: 21.1 % (ref 11.5–14.5)
EST. GFR  (NO RACE VARIABLE): 34.2 ML/MIN/1.73 M^2
EST. GFR  (NO RACE VARIABLE): 38.1 ML/MIN/1.73 M^2
EST. GFR  (NO RACE VARIABLE): 40.4 ML/MIN/1.73 M^2
GLUCOSE SERPL-MCNC: 94 MG/DL (ref 70–110)
GLUCOSE SERPL-MCNC: 94 MG/DL (ref 70–110)
GLUCOSE SERPL-MCNC: 96 MG/DL (ref 70–110)
HCT VFR BLD AUTO: 57.5 % (ref 40–54)
HGB BLD-MCNC: 16.4 G/DL (ref 14–18)
IMM GRANULOCYTES # BLD AUTO: 0.02 K/UL (ref 0–0.04)
IMM GRANULOCYTES NFR BLD AUTO: 0.2 % (ref 0–0.5)
INR PPP: 1.8 (ref 0.8–1.2)
LYMPHOCYTES # BLD AUTO: 1.6 K/UL (ref 1–4.8)
LYMPHOCYTES NFR BLD: 19.3 % (ref 18–48)
MAGNESIUM SERPL-MCNC: 1.9 MG/DL (ref 1.6–2.6)
MAGNESIUM SERPL-MCNC: 2.1 MG/DL (ref 1.6–2.6)
MAGNESIUM SERPL-MCNC: 2.6 MG/DL (ref 1.6–2.6)
MCH RBC QN AUTO: 25.3 PG (ref 27–31)
MCHC RBC AUTO-ENTMCNC: 28.5 G/DL (ref 32–36)
MCV RBC AUTO: 89 FL (ref 82–98)
MONOCYTES # BLD AUTO: 0.7 K/UL (ref 0.3–1)
MONOCYTES NFR BLD: 8.1 % (ref 4–15)
NEUTROPHILS # BLD AUTO: 5.9 K/UL (ref 1.8–7.7)
NEUTROPHILS NFR BLD: 71.1 % (ref 38–73)
NRBC BLD-RTO: 0 /100 WBC
PHOSPHATE SERPL-MCNC: 4.2 MG/DL (ref 2.7–4.5)
PLATELET # BLD AUTO: 201 K/UL (ref 150–450)
PMV BLD AUTO: 9.6 FL (ref 9.2–12.9)
POTASSIUM SERPL-SCNC: 3.3 MMOL/L (ref 3.5–5.1)
POTASSIUM SERPL-SCNC: 3.4 MMOL/L (ref 3.5–5.1)
POTASSIUM SERPL-SCNC: 3.9 MMOL/L (ref 3.5–5.1)
PROT SERPL-MCNC: 8.6 G/DL (ref 6–8.4)
PROTHROMBIN TIME: 18.7 SEC (ref 9–12.5)
RBC # BLD AUTO: 6.47 M/UL (ref 4.6–6.2)
SODIUM SERPL-SCNC: 138 MMOL/L (ref 136–145)
SODIUM SERPL-SCNC: 139 MMOL/L (ref 136–145)
SODIUM SERPL-SCNC: 140 MMOL/L (ref 136–145)
WBC # BLD AUTO: 8.3 K/UL (ref 3.9–12.7)

## 2024-03-21 PROCEDURE — 80053 COMPREHEN METABOLIC PANEL: CPT

## 2024-03-21 PROCEDURE — 99900035 HC TECH TIME PER 15 MIN (STAT)

## 2024-03-21 PROCEDURE — 84100 ASSAY OF PHOSPHORUS: CPT

## 2024-03-21 PROCEDURE — 94640 AIRWAY INHALATION TREATMENT: CPT

## 2024-03-21 PROCEDURE — 25000003 PHARM REV CODE 250

## 2024-03-21 PROCEDURE — 63600175 PHARM REV CODE 636 W HCPCS

## 2024-03-21 PROCEDURE — 83735 ASSAY OF MAGNESIUM: CPT | Mod: 91

## 2024-03-21 PROCEDURE — 83735 ASSAY OF MAGNESIUM: CPT

## 2024-03-21 PROCEDURE — 27100171 HC OXYGEN HIGH FLOW UP TO 24 HOURS

## 2024-03-21 PROCEDURE — 25000242 PHARM REV CODE 250 ALT 637 W/ HCPCS

## 2024-03-21 PROCEDURE — 94660 CPAP INITIATION&MGMT: CPT

## 2024-03-21 PROCEDURE — 80048 BASIC METABOLIC PNL TOTAL CA: CPT

## 2024-03-21 PROCEDURE — 85610 PROTHROMBIN TIME: CPT

## 2024-03-21 PROCEDURE — 85025 COMPLETE CBC W/AUTO DIFF WBC: CPT

## 2024-03-21 PROCEDURE — 36415 COLL VENOUS BLD VENIPUNCTURE: CPT | Mod: XB

## 2024-03-21 PROCEDURE — 80048 BASIC METABOLIC PNL TOTAL CA: CPT | Mod: 91,XB

## 2024-03-21 PROCEDURE — 94761 N-INVAS EAR/PLS OXIMETRY MLT: CPT

## 2024-03-21 RX ORDER — METOLAZONE 2.5 MG/1
2.5 TABLET ORAL
Qty: 16 TABLET | Refills: 11 | Status: ON HOLD | OUTPATIENT
Start: 2024-03-22 | End: 2024-04-27

## 2024-03-21 RX ORDER — MAGNESIUM SULFATE HEPTAHYDRATE 40 MG/ML
2 INJECTION, SOLUTION INTRAVENOUS ONCE
Status: COMPLETED | OUTPATIENT
Start: 2024-03-21 | End: 2024-03-21

## 2024-03-21 RX ORDER — POTASSIUM CHLORIDE 20 MEQ/1
40 TABLET, EXTENDED RELEASE ORAL ONCE
Status: COMPLETED | OUTPATIENT
Start: 2024-03-21 | End: 2024-03-21

## 2024-03-21 RX ORDER — WARFARIN 7.5 MG/1
7.5 TABLET ORAL DAILY
Status: COMPLETED | OUTPATIENT
Start: 2024-03-21 | End: 2024-03-21

## 2024-03-21 RX ORDER — WARFARIN 7.5 MG/1
7.5 TABLET ORAL DAILY
Status: DISCONTINUED | OUTPATIENT
Start: 2024-03-21 | End: 2024-03-21

## 2024-03-21 RX ADMIN — TIOTROPIUM BROMIDE INHALATION SPRAY 2 PUFF: 3.12 SPRAY, METERED RESPIRATORY (INHALATION) at 07:03

## 2024-03-21 RX ADMIN — WARFARIN SODIUM 7.5 MG: 7.5 TABLET ORAL at 04:03

## 2024-03-21 RX ADMIN — ALLOPURINOL 300 MG: 300 TABLET ORAL at 09:03

## 2024-03-21 RX ADMIN — MAGNESIUM SULFATE HEPTAHYDRATE 2 G: 40 INJECTION, SOLUTION INTRAVENOUS at 09:03

## 2024-03-21 RX ADMIN — IPRATROPIUM BROMIDE AND ALBUTEROL SULFATE 3 ML: .5; 3 SOLUTION RESPIRATORY (INHALATION) at 07:03

## 2024-03-21 RX ADMIN — POTASSIUM CHLORIDE 40 MEQ: 1500 TABLET, EXTENDED RELEASE ORAL at 04:03

## 2024-03-21 RX ADMIN — IPRATROPIUM BROMIDE AND ALBUTEROL SULFATE 3 ML: .5; 3 SOLUTION RESPIRATORY (INHALATION) at 12:03

## 2024-03-21 RX ADMIN — ACETAZOLAMIDE 250 MG: 250 TABLET ORAL at 09:03

## 2024-03-21 RX ADMIN — POTASSIUM CHLORIDE 40 MEQ: 1500 TABLET, EXTENDED RELEASE ORAL at 09:03

## 2024-03-21 RX ADMIN — TORSEMIDE 60 MG: 20 TABLET ORAL at 09:03

## 2024-03-21 RX ADMIN — PANTOPRAZOLE SODIUM 40 MG: 40 TABLET, DELAYED RELEASE ORAL at 09:03

## 2024-03-21 RX ADMIN — FLUTICASONE FUROATE AND VILANTEROL TRIFENATATE 1 PUFF: 100; 25 POWDER RESPIRATORY (INHALATION) at 07:03

## 2024-03-21 NOTE — PLAN OF CARE
03/21/24 1445   Post-Acute Status   Post-Acute Authorization Home Health   Home Health Status Referrals Sent     Met with patient  to review discharge recommendation of HH and is agreeable to plan    Patient/family provided list of facilities in-network with patient's payor plan. Providers that are owned, operated, or affiliated with [facility] are included on the list.     Notified that referral sent per care port to below listed facilities from in-network list based on proximity to home/family support:   [facility]  2.  3.  4.  5. (can send more than 5)    Patient/family instructed to identify preference.    Preferred Facility: (if more than 1, listed in order of descending preference)  [facility]    If an additional preferred facility not listed above is identified, additional referral to be sent. If above facilities unable to accept, will send additional referrals to in-network providers.  Also spoke with Sathya at [facility] and made her aware of patient referral and that patient wanted to resume with them. HH orders sent.  She stated she will follow up .     Damion Jarvis RN    [phone]

## 2024-03-21 NOTE — PLAN OF CARE
[facility] - Cardiology Stepdown  Discharge Final Note    Primary Care Provider: Gianni Escalona MD    Expected Discharge Date: 3/21/2024    Final Discharge Note (most recent)       Final Note - 03/21/24 1508          Final Note    Anticipated Discharge Disposition Home-Health Care [facility]     What phone number can be called within the next 1-3 days to see how you are doing after discharge? [phone]     Hospital Resources/Appts/Education Provided Provided patient/caregiver with written discharge plan information;Provided education on problems/symptoms using teachback;Post-Acute resouces added to AVS        Post-Acute Status    Post-Acute Authorization Home Health     Home Health Status Set-up Complete/Auth obtained     Discharge Delays None known at this time                     Important Message from Medicare  Important Message from Medicare regarding Discharge Appeal Rights: Given to patient/caregiver, Explained to patient/caregiver, Signed/date by patient/caregiver     Date IMM was signed: 03/20/24  Time IMM was signed: 1630    Contact Info       Gianni Escalona MD   Specialty: Internal Medicine   Relationship: PCP - General    [address]  [facility] [address], [address]  [facility] [address]   Phone: [phone]       Next Steps: Follow up in 1 week(s)    [facility]     [address]   [place], [place] [id number] [phone]       Next Steps: Call in 1 day(s)    Instructions: call tomorrow if you do not hear from them for your HH., Hospital follow up         Damion Jarvis RN    [phone]

## 2024-03-21 NOTE — DISCHARGE SUMMARY
[facility] - Cardiology [facility] Medicine  Discharge Summary      Patient Name: Yong Mcintyre  MRN: [id number]  HUEY: [id number]  Patient Class: IP- Inpatient  Admission Date: 3/15/2024  Hospital Length of Stay: 6 days  Discharge Date and Time:  03/21/2024 2:13 PM  Attending Physician: Augie Somers III*   Discharging Provider: Montserrat Pollock MD  Primary Care Provider: Gianni Escalona MD  Hospital Medicine Team: [place] [address] Montserrat Pollock MD  Primary Care Team: [facility] 4    HPI:   Mr. Mcintyre is a 47 y/o male with a PMH of HFrEF, HTN, pHTN, Chronic hypoxic respiratory failure (baseline oxygen req at home is 3 L), PFO (unsuccessful closure in 2006), AF (on coumadin), Obesity hypoventilation syndrome, Morbid obesity presenting to the ED with abdominal bloating, dyspnea for the last 4 days, with weight gain of 10 lbs this week. His dry weight is 231 and this morning was weighing 246.Satting 82% on his home O2 of 3 L, currently satting 94% on 5-6L. Patient reports he has been having this epigastric bloating and associated belching that has been reoccurring. Patient reports that he visited his PCP, and suspected GERD and prescribed him Protonix with little improvement. Patient denies fever, N/V/D, no changes in BM, no increasing passing of gas, no chills, dysuria, flank pain, does endorse orthopnea. Patient does admit to SOB (seems to be chronic but worsens with bloating), belching, and feeling mildly nauseous. Patient has been adherent to medication regimen consisting diuretics and 2 g sodium diet. No new sick contacts. Performs ADL's and tries to works out.       In the ED /64, , RR 18, on 6L oxygen via [place], Labs significant for INR 2.3 (goal 2-3), Na 134, K 2.7, , troponin 0.042, Bun 91, Cr 2.4, PcO2 75.7, HCO3 48.1, he was given 80 lasix in ED with 500cc UOP so far also found to have long Qtc on repeat EKG.     * No surgery found *      Hospital Course:   Has been diuresing  well on IV lasix drip with intermittent diamox for metabolic alkalosis. Approaching dry weight. Consult HTS inpatient due to severe pulmonary hypertension. They didn't have any new recommendations at this time. Transitioned from IV lasix to oral torsemide and metolazone. Patient has been diuresing well on oral diuretic. Was started on diamox inpatient for chronic metabolic acidosis. Was using 3-5 L of oxygen inpatient. Patient states he feel improved and light weight with all the fluid off of him. Will discharge on oral diuretic with outpatient labs and follow up with pulmonology, cardiology and  PCP. Medically stable for discharge.     Goals of Care Treatment Preferences:  Code Status: Full Code      Consults:   Consults (From admission, onward)          Status Ordering Provider     Inpatient consult to Heart Transplant  Once        Provider:  (Not yet assigned)    Completed DEIDRE MATA     Inpatient consult to Registered Dietitian/Nutritionist  Once        Provider:  (Not yet assigned)    Completed SAMMY SOTO III            No new Assessment & Plan notes have been filed under this hospital service since the last note was generated.  Service: Hospital Medicine    Final Active Diagnoses:    Diagnosis Date Noted POA    PRINCIPAL PROBLEM:  Acute on chronic heart failure with preserved ejection fraction (HFpEF) [I50.33] 05/03/2023 Yes    Obesity (BMI 30-39.9) [E66.9] 03/18/2024 Yes    GERD (gastroesophageal reflux disease) [K21.9] 03/15/2024 Yes    Paroxysmal atrial fibrillation [I48.0] 03/15/2024 Yes    Gout [M10.9] 03/15/2024 Yes    CKD (chronic kidney disease), stage III [N18.30] 12/11/2023 Yes     Chronic    Acute on chronic respiratory failure with hypoxia and hypercapnia [J96.21, J96.22] 02/18/2014 Yes    MALLIKA (obstructive sleep apnea) [G47.33]  Yes     Chronic    Pulmonary hypertension [I27.20] 11/05/2012 Yes     Chronic    PFO (patent foramen ovale) [Q21.12] 11/05/2012 Not Applicable     Chronic       Problems Resolved During this Admission:       Discharged Condition: stable    Disposition: Home or Self Care    Follow Up:   Follow-up Information       Gianni Escalona MD Follow up in 1 week(s).    Specialty: Internal Medicine  Contact information:  [address], [address]  [facility] [address]  [phone]                           Patient Instructions:      COMPREHENSIVE METABOLIC PANEL   Standing Status: Future Standing Exp. Date: 06/19/25   Order Comments: Get the labs done before seeing PCP and pulmonologist.     Magnesium   Standing Status: Future Standing Exp. Date: 06/19/25       Significant Diagnostic Studies: Labs: All labs within the past 24 hours have been reviewed    Pending Diagnostic Studies:       None           Medications:  Reconciled Home Medications:      Medication List        START taking these medications      metOLazone 2.5 MG tablet  Commonly known as: ZAROXOLYN  Take 1 tablet (2.5 mg total) by mouth every Mon, Wed, Fri, Sun. Take 2.5 mg 4 times a week  and as needed in addition to that  Start taking on: March 22, 2024            CHANGE how you take these medications      warfarin 10 MG tablet  Commonly known as: COUMADIN  Take 1/2 tablet (5 mg) by mouth daily or as directed by [facility]  What changed:   how much to take  how to take this  additional instructions            CONTINUE taking these medications      albuterol 90 mcg/actuation inhaler  Commonly known as: VENTOLIN HFA  Inhale 2 puffs into the lungs every 4 (four) hours as needed for Wheezing. Rescue     allopurinoL 300 MG tablet  Commonly known as: ZYLOPRIM  Take 1 tablet (300 mg total) by mouth once daily.     b complex vitamins tablet  Take 1 tablet by mouth once daily.     CALCIUM ORAL  Take 1 capsule by mouth once daily.     fish oil-omega-3 fatty acids 300-1,000 mg capsule  Take 1 capsule by mouth once daily.     ONE-A-DAY MEN'S COMPLETE ORAL  Take 1 tablet by mouth once daily.     pantoprazole 40  MG tablet  Commonly known as: PROTONIX  Take 1 tablet (40 mg total) by mouth once daily.     potassium chloride 10 MEQ Cpsr  Commonly known as: MICRO-K  Take 10 mEq by mouth 2 (two) times daily.     tiotropium 18 mcg inhalation capsule  Commonly known as: SPIRIVA  Inhale 18 mcg into the lungs once daily.     torsemide 20 MG Tab  Commonly known as: DEMADEX  Take 3 tablets (60 mg total) by mouth 2 (two) times a day.     TYLENOL ARTHRITIS ORAL  Take 2 tablets by mouth daily as needed (pain).     VISINE DRY EYE RELIEF 1 % Drop  Generic drug: polyethylene glycol 400  Place 2 drops into both eyes daily as needed (dry eyes).     VITAMIN C ORAL  Take 1 tablet by mouth once daily.     WIXELA INHUB 250-50 mcg/dose diskus inhaler  Generic drug: fluticasone-salmeterol 250-50 mcg/dose  1 puff 2 (two) times daily. USE 1 INHALATION BY MOUTH TWICE DAILY              Indwelling Lines/Drains at time of discharge:   Lines/Drains/Airways       None                   Time spent on the discharge of patient: 45 minutes         Montserrat Pollock MD  Department of Hospital Medicine  [facility] - Cardiology Stepdown

## 2024-03-21 NOTE — NURSING
1650- Pt discharge per md orders. Pt verbalize complete understanding and follow up appointment. Pt was given a copy of home care d/c instructions w/ a list of home medications.  Address all issues prior d/c . Pt left w/ all of valuables. Pt voice no concerns. Transport notified of discharge. Tele box place in dirty bin by .

## 2024-03-21 NOTE — PLAN OF CARE
"[facility] - Cardiology Stepdown      HOME HEALTH ORDERS  FACE TO FACE ENCOUNTER    Patient Name: Yong Mcintyre  YOB: 1975    PCP: Gianni Escalona MD   PCP Address: [address] [facility], [address] / [place] [address]  PCP Phone Number: [phone]  PCP Fax: [phone]    Encounter Date: 3/15/24    Admit to Home Health    Diagnoses:  Active Hospital Problems    Diagnosis  POA    *Acute on chronic heart failure with preserved ejection fraction (HFpEF) [I50.33]  Yes    Obesity (BMI 30-39.9) [E66.9]  Yes    GERD (gastroesophageal reflux disease) [K21.9]  Yes    Paroxysmal atrial fibrillation [I48.0]  Yes    Gout [M10.9]  Yes    CKD (chronic kidney disease), stage III [N18.30]  Yes     Chronic    Acute on chronic respiratory failure with hypoxia and hypercapnia [J96.21, J96.22]  Yes    MALLIKA (obstructive sleep apnea) [G47.33]  Yes     Chronic    Pulmonary hypertension [I27.20]  Yes     Chronic    PFO (patent foramen ovale) [Q21.12]  Not Applicable     Chronic      Resolved Hospital Problems   No resolved problems to display.       Follow Up Appointments:  Future Appointments   Date Time Provider Department Center   4/4/2024  3:00 PM Kirt Moreau MD [facility] [place]   4/23/2024  8:00 AM Paige Jacobson MD [facility]       Allergies:  Review of patient's allergies indicates:   Allergen Reactions    Lipitor [atorvastatin] Other (See Comments)     "it makes my legs feel like jelly"  Other reaction(s): causes legs to hurt       Medications: Review discharge medications with patient and family and provide education.    Current Facility-Administered Medications   Medication Dose Route Frequency Provider Last Rate Last Admin    acetaminophen tablet 650 mg  650 mg Oral Q6H PRN Rupesh Angulo MD        acetaZOLAMIDE tablet 250 mg  250 mg Oral Daily Rupesh Angulo MD   250 mg at 03/21/24 0947    albuterol-ipratropium 2.5 mg-0.5 mg/3 mL nebulizer solution 3 mL  3 mL Nebulization Q8H " Montserrat Pollock MD   3 mL at 03/21/24 0732    allopurinoL tablet 300 mg  300 mg Oral Daily Montserrat Pollock MD   300 mg at 03/21/24 0947    aluminum-magnesium hydroxide-simethicone 200-200-20 mg/5 mL suspension 30 mL  30 mL Oral QID PRN Montserrat Pollock MD        dextrose 10% bolus 125 mL 125 mL  12.5 g Intravenous PRN Montserrat Pollock MD        dextrose 10% bolus 250 mL 250 mL  25 g Intravenous PRN Montserrat Pollock MD        fluticasone furoate-vilanteroL 100-25 mcg/dose diskus inhaler 1 puff  1 puff Inhalation Daily Montserrat Pollock MD   1 puff at 03/21/24 0733    glucagon (human recombinant) injection 1 mg  1 mg Intramuscular PRN Montserrat Pollock MD        glucose chewable tablet 16 g  16 g Oral PRN Montserrat Pollock MD        glucose chewable tablet 24 g  24 g Oral PRN Montserrat Pollock MD        melatonin tablet 6 mg  6 mg Oral Nightly PRN Montserrat Pollock MD        metOLazone tablet 2.5 mg  2.5 mg Oral Every Mon, Wed, Fri Montserrat Pollock MD   2.5 mg at 03/20/24 1204    naloxone 0.4 mg/mL injection 0.02 mg  0.02 mg Intravenous PRN Montserrat Pollock MD        pantoprazole EC tablet 40 mg  40 mg Oral Daily Montserrat Pollock MD   40 mg at 03/21/24 0947    polyethylene glycol packet 17 g  17 g Oral Daily Rupesh Angulo MD   17 g at 03/20/24 0829    senna tablet 8.6 mg  8.6 mg Oral Daily Rupesh Angulo MD   8.6 mg at 03/20/24 0826    sodium chloride 0.9% flush 10 mL  10 mL Intravenous Q12H PRN Montserrat Pollock MD        sodium chloride 0.9% flush 10 mL  10 mL Intravenous PRN Augie Somers III, MD        tiotropium bromide 2.5 mcg/actuation inhaler 2 puff  2 puff Inhalation Daily Rupesh Angulo MD   2 puff at 03/21/24 0736    torsemide tablet 60 mg  60 mg Oral BID Montserrat Pollock MD   60 mg at 03/21/24 0946    warfarin (COUMADIN) tablet 7.5 mg  7.5 mg Oral Daily Rupesh Angulo MD         Current Discharge Medication List        START taking these medications    Details   metOLazone (ZAROXOLYN) 2.5 MG tablet Take 1 tablet (2.5 mg total) by mouth  every Mon, Wed, Fri, Sun. Take 2.5 mg 4 times a week  and as needed in addition to that  Qty: 16 tablet, Refills: 11    Comments: .           CONTINUE these medications which have NOT CHANGED    Details   acetaminophen (TYLENOL ARTHRITIS ORAL) Take 2 tablets by mouth daily as needed (pain).      allopurinoL (ZYLOPRIM) 300 MG tablet Take 1 tablet (300 mg total) by mouth once daily.  Qty: 90 tablet, Refills: 3    Associated Diagnoses: Chronic pulmonary heart disease; Primary pulmonary hypertension; Atrial fibrillation, unspecified type; Pulmonary hypertension; PFO (patent foramen ovale); Paroxysmal atrial fibrillation; Cor pulmonale; Pickwickian syndrome; Hyperthyroidism      ascorbic acid (VITAMIN C ORAL) Take 1 tablet by mouth once daily.      b complex vitamins tablet Take 1 tablet by mouth once daily.      CALCIUM ORAL Take 1 capsule by mouth once daily.      pantoprazole (PROTONIX) 40 MG tablet Take 1 tablet (40 mg total) by mouth once daily.  Qty: 90 tablet, Refills: 3      torsemide (DEMADEX) 20 MG Tab Take 3 tablets (60 mg total) by mouth 2 (two) times a day.  Qty: 270 tablet, Refills: 3    Comments: .      albuterol (VENTOLIN HFA) 90 mcg/actuation inhaler Inhale 2 puffs into the lungs every 4 (four) hours as needed for Wheezing. Rescue  Qty: 18 g, Refills: 2    Associated Diagnoses: Chronic pulmonary heart disease      multivit,calc,min/FA/K1/lycop (ONE-A-DAY MEN'S COMPLETE ORAL) Take 1 tablet by mouth once daily.      omega-3 fatty acids/fish oil (FISH OIL-OMEGA-3 FATTY ACIDS) 300-1,000 mg capsule Take 1 capsule by mouth once daily.      polyethylene glycol 400 (VISINE DRY EYE RELIEF) 1 % Drop Place 2 drops into both eyes daily as needed (dry eyes).      potassium chloride (MICRO-K) 10 MEQ CpSR Take 10 mEq by mouth 2 (two) times daily.      tiotropium (SPIRIVA) 18 mcg inhalation capsule Inhale 18 mcg into the lungs once daily.      warfarin (COUMADIN) 10 MG tablet Take 1/2 tablet (5 mg) by mouth daily or as  directed by Coumadin Clinic  Qty: 30 tablet, Refills: 3    Associated Diagnoses: Pickwickian syndrome; Morbid obesity; Chronic cardiopulmonary disease; Cor pulmonale; MALLIKA (obstructive sleep apnea); Chronic pulmonary heart disease; Long term current use of anticoagulant therapy      WIXELA INHUB 250-50 mcg/dose diskus inhaler 1 puff 2 (two) times daily. USE 1 INHALATION BY MOUTH TWICE DAILY               I have seen and examined this patient within the last 30 days. My clinical findings that support the need for the home health skilled services and home bound status are the following:no   Medical restrictions requiring assistance of another human to leave home due to  Dyspnea on exertion (SOB), Fluid/volume overload, Home oxygen requirement, and Morbid Obesity.     Diet:   fluid restriction and 2 gram sodium diet    Labs:  SN to perform labs:  CMP AND MAG WEEKLY    Referrals/ Consults  Physical Therapy to evaluate and treat. Evaluate for home safety and equipment needs; Establish/upgrade home exercise program. Perform / instruct on therapeutic exercises, gait training, transfer training, and Range of Motion.  Occupational Therapy to evaluate and treat. Evaluate home environment for safety and equipment needs. Perform/Instruct on transfers, ADL training, ROM, and therapeutic exercises.    Activities:   activity as tolerated    Nursing:   Agency to admit patient within 24 hours of hospital discharge unless specified on physician order or at patient request    SN to complete comprehensive assessment including routine vital signs. Instruct on disease process and s/s of complications to report to MD. Review/verify medication list sent home with the patient at time of discharge  and instruct patient/caregiver as needed. Frequency may be adjusted depending on start of care date.     Skilled nurse to perform up to 3 visits PRN for symptoms related to diagnosis    Notify MD if SBP > 160 or < 90; DBP > 90 or < 50; HR > 120 or  < 50; Temp > 101; O2 < 88%;     Ok to schedule additional visits based on staff availability and patient request on consecutive days within the home health episode.    When multiple disciplines ordered:    Start of Care occurs on Sunday - Wednesday schedule remaining discipline evaluations as ordered on separate consecutive days following the start of care.    Thursday SOC -schedule subsequent evaluations Friday and Monday the following week.     Friday - Saturday SOC - schedule subsequent discipline evaluations on consecutive days starting Monday of the following week.    For all post-discharge communication and subsequent orders please contact patient's primary care physician.   Miscellaneous   Home Oxygen:  Oxygen at 3-5 L/min nasal canula to be used:  Continuously.    Home Health Aide:  Physical Therapy Three times weekly and Occupational Therapy Three times weekly    Wound Care Orders  no    I certify that this patient is confined to his home and needs physical therapy and occupational therapy.

## 2024-03-21 NOTE — DISCHARGE INSTRUCTIONS
Follow up with Pulmonology, PCP, Cardiology and get the labs done before seeing your doctors. Start taking metolazone 2.5 mg 4 times a week and as needed in addition to that for fluid overload. Continue taking torsemide 60 mg BID.

## 2024-03-22 ENCOUNTER — ANTI-COAG VISIT (OUTPATIENT)
Dept: CARDIOLOGY | Facility: CLINIC | Age: 49
End: 2024-03-22
Payer: MEDICARE

## 2024-03-22 ENCOUNTER — PATIENT OUTREACH (OUTPATIENT)
Dept: ADMINISTRATIVE | Facility: CLINIC | Age: 49
End: 2024-03-22
Payer: MEDICARE

## 2024-03-22 DIAGNOSIS — I27.9 CHRONIC PULMONARY HEART DISEASE: Primary | ICD-10-CM

## 2024-03-22 NOTE — CHAPLAIN
03/20/24 1145   Clinical Encounter Type   Visit Type Initial Visit   Visit Category General Rounding   Visited With Patient   Length of Visit 10 Minutes   Continue Visiting No   Zoroastrian Encounters   Spiritual Resources Requested Prayer   Patient Spiritual Encounters   Care Provided Reflective listening;Prayer support;Compassionate presence   Patient Coping Accepting;Open/discussion   Comments - Patient Pt was anxious and walking in the room to be discharged and go home. Complained about some nurses using to much perfume. Otherwise, everything great. Thanked for the care.

## 2024-03-22 NOTE — PROGRESS NOTES
C3 nurse spoke with Yong Mcintyre for a TCC post hospital discharge follow up call. Scheduled in home HOSFU appointment with Sanaz Isabel NP on 04/02/24 @ 0800.

## 2024-03-22 NOTE — PHARMACY MED REC
"Admission Medication History     The home medication history was taken by Sary Sheldon.    You may go to "Admission" then "Reconcile Home Medications" tabs to review and/or act upon these items.     The home medication list has been updated by the Pharmacy department.   Please read ALL comments highlighted in yellow.   Please address this information as you see fit.    Feel free to contact us if you have any questions or require assistance.      The medications listed below were removed from the home medication list. Please reorder if appropriate:  Patient reports no longer taking the following medication(s):  sildenafil (REVATIO) 20 mg Tab    Medications listed below were obtained from: Patient/family and Analytic software- [employer]  Current Outpatient Medications on File Prior to Encounter   Medication Sig    acetaminophen (TYLENOL ARTHRITIS ORAL)   Take 2 tablets by mouth daily as needed (pain).    albuterol (VENTOLIN HFA) 90 mcg/actuation inhaler   Inhale 2 puffs into the lungs every 4 (four) hours as needed for Wheezing. Rescue    allopurinoL (ZYLOPRIM) 300 MG tablet   Take 1 tablet (300 mg total) by mouth once daily.    ascorbic acid (VITAMIN C ORAL)   Take 1 tablet by mouth once daily.    b complex vitamins tablet   Take 1 tablet by mouth once daily.    CALCIUM ORAL   Take 1 capsule by mouth once daily.    multivit,calc,min/FA/K1/lycop (ONE-A-DAY MEN'S COMPLETE ORAL)   Take 1 tablet by mouth once daily.    omega-3 fatty acids/fish oil (FISH OIL-OMEGA-3 FATTY ACIDS) 300-1,000 mg capsule   Take 1 capsule by mouth once daily.    pantoprazole (PROTONIX) 40 MG tablet   Take 40 mg by mouth every morning.    polyethylene glycol 400 (VISINE DRY EYE RELIEF) 1 % Drop   Place 2 drops into both eyes daily as needed (dry eyes).    potassium chloride (MICRO-K) 10 MEQ CpSR   Take 10 mEq by mouth 2 (two) times daily.    tiotropium (SPIRIVA) 18 mcg inhalation capsule   Inhale 18 mcg into the lungs once daily.    torsemide " (DEMADEX) 20 MG Tab Take 3 tablets (60 mg total) by mouth 2 (two) times a day.      warfarin (COUMADIN) 10 MG tablet  Take 5 mg by mouth Daily. Or as directed by the coumadin clinic.)      WIXELA INHUB 250-50 mcg/dose diskus inhaler 1 puff 2 (two) times daily. USE 1 INHALATION BY MOUTH TWICE DAILY      [DISCONTINUED] sildenafil (REVATIO) 20 mg Tab Take 1 tablet (20 mg total) by mouth 3 (three) times daily.     Sary Burfect  EXT [id number]                 .

## 2024-03-22 NOTE — PROGRESS NOTES
Patient admitted to [facility] 3/15/24-3/21/24; Patient presented to ER  with abdominal bloating, dyspnea for the last 4 days, with weight gain of 10 lbs this week. Diuresed on IV lasix then transitioned to oral torsemide and metolazone.   Will discharge on oral diuretic with outpatient labs and follow up with pulmonology, cardiology and  PCP. Medically stable for discharge.   3/22/24: STAT ; STAT ; [phone]

## 2024-03-22 NOTE — PROGRESS NOTES
Spoke to patient regarding discharge medication dosing. A discharge warfarin dose of 5 mg qPM confirmed with patient. INR scheduled for 03.25.24, with STAT [facility]. Orders faxed.

## 2024-03-25 ENCOUNTER — PATIENT OUTREACH (OUTPATIENT)
Dept: ADMINISTRATIVE | Facility: HOSPITAL | Age: 49
End: 2024-03-25
Payer: MEDICARE

## 2024-03-25 NOTE — PROGRESS NOTES
Health Maintenance Due   Topic Date Due    TETANUS VACCINE  Never done    Pneumococcal Vaccines (Age 0-64) (2 of 2 - PCV) 10/23/2018    Colorectal Cancer Screening  Never done    COVID-19 Vaccine (6 - 2023-24 season) 09/01/2023       Chart reviewed and updated.     Alka Kilgore LPN   Clinical Care Coordinator  Primary Care and Wellness

## 2024-03-29 LAB — INR PPP: 4.3

## 2024-04-01 ENCOUNTER — ANTI-COAG VISIT (OUTPATIENT)
Dept: CARDIOLOGY | Facility: CLINIC | Age: 49
End: 2024-04-01
Payer: MEDICARE

## 2024-04-01 DIAGNOSIS — I27.9 CHRONIC PULMONARY HEART DISEASE: Primary | ICD-10-CM

## 2024-04-01 PROCEDURE — 93793 ANTICOAG MGMT PT WARFARIN: CPT | Mod: S$GLB,,, | Performed by: PHARMACIST

## 2024-04-02 ENCOUNTER — OFFICE VISIT (OUTPATIENT)
Dept: HOME HEALTH SERVICES | Facility: CLINIC | Age: 49
End: 2024-04-02
Payer: MEDICARE

## 2024-04-02 DIAGNOSIS — G47.33 OSA (OBSTRUCTIVE SLEEP APNEA): Chronic | ICD-10-CM

## 2024-04-02 DIAGNOSIS — K21.9 GASTROESOPHAGEAL REFLUX DISEASE, UNSPECIFIED WHETHER ESOPHAGITIS PRESENT: ICD-10-CM

## 2024-04-02 DIAGNOSIS — I27.20 PULMONARY HYPERTENSION: Primary | Chronic | ICD-10-CM

## 2024-04-02 DIAGNOSIS — I48.0 PAROXYSMAL ATRIAL FIBRILLATION: ICD-10-CM

## 2024-04-02 DIAGNOSIS — I50.812 CHRONIC RIGHT-SIDED HEART FAILURE: Chronic | ICD-10-CM

## 2024-04-02 DIAGNOSIS — I10 PRIMARY HYPERTENSION: ICD-10-CM

## 2024-04-02 DIAGNOSIS — N18.30 STAGE 3 CHRONIC KIDNEY DISEASE, UNSPECIFIED WHETHER STAGE 3A OR 3B CKD: Chronic | ICD-10-CM

## 2024-04-02 DIAGNOSIS — E66.01 SEVERE OBESITY (BMI >= 40): ICD-10-CM

## 2024-04-02 PROCEDURE — 99497 ADVNCD CARE PLAN 30 MIN: CPT | Mod: S$GLB,,, | Performed by: NURSE PRACTITIONER

## 2024-04-02 PROCEDURE — 3074F SYST BP LT 130 MM HG: CPT | Mod: CPTII,S$GLB,, | Performed by: NURSE PRACTITIONER

## 2024-04-02 PROCEDURE — 3078F DIAST BP <80 MM HG: CPT | Mod: CPTII,S$GLB,, | Performed by: NURSE PRACTITIONER

## 2024-04-02 PROCEDURE — 3044F HG A1C LEVEL LT 7.0%: CPT | Mod: CPTII,S$GLB,, | Performed by: NURSE PRACTITIONER

## 2024-04-02 PROCEDURE — 99495 TRANSJ CARE MGMT MOD F2F 14D: CPT | Mod: S$GLB,,, | Performed by: NURSE PRACTITIONER

## 2024-04-02 NOTE — PROGRESS NOTES
INR is out of range and now 4 days old. Patient will be advised to hold coumadin today and repeat INR tomorrow STAT.

## 2024-04-03 ENCOUNTER — LAB VISIT (OUTPATIENT)
Dept: LAB | Facility: HOSPITAL | Age: 49
End: 2024-04-03
Attending: INTERNAL MEDICINE
Payer: MEDICARE

## 2024-04-03 DIAGNOSIS — Z79.01 LONG TERM CURRENT USE OF ANTICOAGULANT THERAPY: ICD-10-CM

## 2024-04-03 DIAGNOSIS — I27.9 CHRONIC PULMONARY HEART DISEASE: ICD-10-CM

## 2024-04-03 LAB
INR PPP: 1.8 (ref 0.8–1.2)
PROTHROMBIN TIME: 18.2 SEC (ref 9–12.5)

## 2024-04-03 PROCEDURE — 36415 COLL VENOUS BLD VENIPUNCTURE: CPT | Performed by: INTERNAL MEDICINE

## 2024-04-03 PROCEDURE — 85610 PROTHROMBIN TIME: CPT | Performed by: INTERNAL MEDICINE

## 2024-04-04 ENCOUNTER — ANTI-COAG VISIT (OUTPATIENT)
Dept: CARDIOLOGY | Facility: CLINIC | Age: 49
End: 2024-04-04
Payer: MEDICARE

## 2024-04-04 DIAGNOSIS — I27.9 CHRONIC PULMONARY HEART DISEASE: Primary | ICD-10-CM

## 2024-04-04 PROCEDURE — 93793 ANTICOAG MGMT PT WARFARIN: CPT | Mod: S$GLB,,, | Performed by: PHARMACIST

## 2024-04-06 NOTE — PROGRESS NOTES
MEDICAL HISTORY:  Pulmonary hypertension.  Hypoventilation syndrome associated with chronic respiratory failure of hypercapnia and hypoxemia.  He is on 3 liters O2.  Heart failure preserved ejection fraction  Obstructive sleep apnea with the use of CPAP.  Gout.  Patent foramen ovale.,   Cholecystectomy.     SOCIAL HISTORY:  Tobacco use and alcohol use - none.     MEDICATIONS:   Pantoprazole 40 mg   Torsemide 60 mg b.I.d.  Allopurinol 300 mg  Advair Diskus 250/50 one puff twice a day  Micro-K 10 mEq b.i.d..     It was also 3 tablets in the morning and 2 tablets in the evening on days when he takes metolazone  Coumadin.t  Spiriva 1 puff daily  Metolazone 2.5 mg, Monday, Wednesday, Friday, Sunday      2D echo report 3/17/2024      Left Ventricle: The left ventricle is normal in size. Septal flattening in diastole consistent with right ventricular volume overload. There is normal systolic function. Ejection fraction by visual approximation is 60%.    Right Ventricle: Severe right ventricular enlargement. Systolic function is severely reduced. See text for details.    Right Atrium: Right atrium is severely dilated.    Tricuspid Valve: There is moderate to severe regurgitation.    Pulmonary Artery: There is severe pulmonary hypertension. The estimated pulmonary artery systolic pressure is 88 mmHg.    IVC/SVC: Elevated venous pressure at 15 mmHg.      48-year-old male  Hospital follow-up   Hospital admit March 15th to March 21st    Presented with a bloating feeling and shortness a breath.  Noted to be in acute hypoxic hypercapnic respiratory failure with chronic respiratory failure, along with acute on chronic heart failure    There was fluid retention, bloating, dyspnea on exertion    Chest x-ray, CT scan noted pulmonary edema, BNP was 858    Treated with IV Lasix and then was transitioned to torsemide in metolazone.  Metolazone dose now is that 4 days a week    He was Has chronic kidney disease and potassium was  low    It was reported that his dry weight is around 231 lb.  When he was admitted he remembers the weight being in the 240    Presently feels much better.  He does complain of dry skin.  Breathing is much improved but will have dyspnea on exertion but is much better compared to before.  No chest pain or abdominal pain he was regular bowel function    Examination  Weight 227 lb  Pulse 108   Pulse ox 82%   Blood pressure 92/72  Chest clear breath sounds  Heart regular rate and rhythm   Abdominal exam soft nontender   Extremities 1+ edema    Impression   Chronic heart failure with right ventricular dysfunction  Chronic respiratory failure   Pulmonary hypertension  Paroxysmal atrial fibrillation  Chronic kidney disease stage 3    Plan  Follow-up basic metabolic profile, CBC, BNP  He has establish a follow-up visit with his pulmonologist      Monitor pulse ox and heart rate.  In the past he was on the medication metoprolol

## 2024-04-08 ENCOUNTER — LAB VISIT (OUTPATIENT)
Dept: LAB | Facility: HOSPITAL | Age: 49
End: 2024-04-08
Attending: INTERNAL MEDICINE
Payer: MEDICARE

## 2024-04-08 ENCOUNTER — OFFICE VISIT (OUTPATIENT)
Dept: INTERNAL MEDICINE | Facility: CLINIC | Age: 49
End: 2024-04-08
Payer: MEDICARE

## 2024-04-08 VITALS
WEIGHT: 227.5 LBS | HEIGHT: 65 IN | OXYGEN SATURATION: 78 % | HEART RATE: 116 BPM | DIASTOLIC BLOOD PRESSURE: 70 MMHG | SYSTOLIC BLOOD PRESSURE: 90 MMHG | BODY MASS INDEX: 37.9 KG/M2

## 2024-04-08 DIAGNOSIS — I50.32 CHRONIC HEART FAILURE WITH PRESERVED EJECTION FRACTION: ICD-10-CM

## 2024-04-08 DIAGNOSIS — J96.12 CHRONIC RESPIRATORY FAILURE WITH HYPOXIA AND HYPERCAPNIA: Primary | ICD-10-CM

## 2024-04-08 DIAGNOSIS — J96.11 CHRONIC RESPIRATORY FAILURE WITH HYPOXIA AND HYPERCAPNIA: Primary | ICD-10-CM

## 2024-04-08 DIAGNOSIS — I27.20 PULMONARY HYPERTENSION: ICD-10-CM

## 2024-04-08 DIAGNOSIS — N18.30 STAGE 3 CHRONIC KIDNEY DISEASE, UNSPECIFIED WHETHER STAGE 3A OR 3B CKD: ICD-10-CM

## 2024-04-08 DIAGNOSIS — J96.12 CHRONIC RESPIRATORY FAILURE WITH HYPOXIA AND HYPERCAPNIA: ICD-10-CM

## 2024-04-08 DIAGNOSIS — J96.11 CHRONIC RESPIRATORY FAILURE WITH HYPOXIA AND HYPERCAPNIA: ICD-10-CM

## 2024-04-08 LAB
ANION GAP SERPL CALC-SCNC: 15 MMOL/L (ref 8–16)
BASOPHILS # BLD AUTO: 0.07 K/UL (ref 0–0.2)
BASOPHILS NFR BLD: 0.8 % (ref 0–1.9)
BNP SERPL-MCNC: 575 PG/ML (ref 0–99)
BUN SERPL-MCNC: 88 MG/DL (ref 6–20)
CALCIUM SERPL-MCNC: 10.6 MG/DL (ref 8.7–10.5)
CHLORIDE SERPL-SCNC: 84 MMOL/L (ref 95–110)
CO2 SERPL-SCNC: 40 MMOL/L (ref 23–29)
CREAT SERPL-MCNC: 2.7 MG/DL (ref 0.5–1.4)
DIFFERENTIAL METHOD BLD: ABNORMAL
EOSINOPHIL # BLD AUTO: 0.1 K/UL (ref 0–0.5)
EOSINOPHIL NFR BLD: 1.3 % (ref 0–8)
ERYTHROCYTE [DISTWIDTH] IN BLOOD BY AUTOMATED COUNT: 21.6 % (ref 11.5–14.5)
EST. GFR  (NO RACE VARIABLE): 28.2 ML/MIN/1.73 M^2
GLUCOSE SERPL-MCNC: 109 MG/DL (ref 70–110)
HCT VFR BLD AUTO: 61.2 % (ref 40–54)
HGB BLD-MCNC: 17.7 G/DL (ref 14–18)
IMM GRANULOCYTES # BLD AUTO: 0.02 K/UL (ref 0–0.04)
IMM GRANULOCYTES NFR BLD AUTO: 0.2 % (ref 0–0.5)
LYMPHOCYTES # BLD AUTO: 1.5 K/UL (ref 1–4.8)
LYMPHOCYTES NFR BLD: 16.2 % (ref 18–48)
MAGNESIUM SERPL-MCNC: 1.9 MG/DL (ref 1.6–2.6)
MCH RBC QN AUTO: 26.3 PG (ref 27–31)
MCHC RBC AUTO-ENTMCNC: 28.9 G/DL (ref 32–36)
MCV RBC AUTO: 91 FL (ref 82–98)
MONOCYTES # BLD AUTO: 0.6 K/UL (ref 0.3–1)
MONOCYTES NFR BLD: 6.9 % (ref 4–15)
NEUTROPHILS # BLD AUTO: 6.8 K/UL (ref 1.8–7.7)
NEUTROPHILS NFR BLD: 74.6 % (ref 38–73)
NRBC BLD-RTO: 0 /100 WBC
PLATELET # BLD AUTO: 267 K/UL (ref 150–450)
PMV BLD AUTO: 10.8 FL (ref 9.2–12.9)
POTASSIUM SERPL-SCNC: 4.5 MMOL/L (ref 3.5–5.1)
RBC # BLD AUTO: 6.72 M/UL (ref 4.6–6.2)
SODIUM SERPL-SCNC: 139 MMOL/L (ref 136–145)
T4 FREE SERPL-MCNC: 1.07 NG/DL (ref 0.71–1.51)
TSH SERPL DL<=0.005 MIU/L-ACNC: 4.31 UIU/ML (ref 0.4–4)
WBC # BLD AUTO: 9.12 K/UL (ref 3.9–12.7)

## 2024-04-08 PROCEDURE — 3074F SYST BP LT 130 MM HG: CPT | Mod: CPTII,S$GLB,, | Performed by: INTERNAL MEDICINE

## 2024-04-08 PROCEDURE — 99999 PR PBB SHADOW E&M-EST. PATIENT-LVL IV: CPT | Mod: PBBFAC,,, | Performed by: INTERNAL MEDICINE

## 2024-04-08 PROCEDURE — 3044F HG A1C LEVEL LT 7.0%: CPT | Mod: CPTII,S$GLB,, | Performed by: INTERNAL MEDICINE

## 2024-04-08 PROCEDURE — 1160F RVW MEDS BY RX/DR IN RCRD: CPT | Mod: CPTII,S$GLB,, | Performed by: INTERNAL MEDICINE

## 2024-04-08 PROCEDURE — 84443 ASSAY THYROID STIM HORMONE: CPT | Performed by: INTERNAL MEDICINE

## 2024-04-08 PROCEDURE — 80048 BASIC METABOLIC PNL TOTAL CA: CPT | Performed by: INTERNAL MEDICINE

## 2024-04-08 PROCEDURE — 1111F DSCHRG MED/CURRENT MED MERGE: CPT | Mod: CPTII,S$GLB,, | Performed by: INTERNAL MEDICINE

## 2024-04-08 PROCEDURE — 3078F DIAST BP <80 MM HG: CPT | Mod: CPTII,S$GLB,, | Performed by: INTERNAL MEDICINE

## 2024-04-08 PROCEDURE — 83735 ASSAY OF MAGNESIUM: CPT | Performed by: INTERNAL MEDICINE

## 2024-04-08 PROCEDURE — 85025 COMPLETE CBC W/AUTO DIFF WBC: CPT | Performed by: INTERNAL MEDICINE

## 2024-04-08 PROCEDURE — 99214 OFFICE O/P EST MOD 30 MIN: CPT | Mod: S$GLB,,, | Performed by: INTERNAL MEDICINE

## 2024-04-08 PROCEDURE — 1159F MED LIST DOCD IN RCRD: CPT | Mod: CPTII,S$GLB,, | Performed by: INTERNAL MEDICINE

## 2024-04-08 PROCEDURE — 84439 ASSAY OF FREE THYROXINE: CPT | Performed by: INTERNAL MEDICINE

## 2024-04-08 PROCEDURE — 3008F BODY MASS INDEX DOCD: CPT | Mod: CPTII,S$GLB,, | Performed by: INTERNAL MEDICINE

## 2024-04-08 PROCEDURE — 36415 COLL VENOUS BLD VENIPUNCTURE: CPT | Performed by: INTERNAL MEDICINE

## 2024-04-08 PROCEDURE — 83880 ASSAY OF NATRIURETIC PEPTIDE: CPT | Performed by: INTERNAL MEDICINE

## 2024-04-08 RX ORDER — COVID-19 VACCINE, MRNA 50 UG/.5ML
0.5 INJECTION, SUSPENSION INTRAMUSCULAR
COMMUNITY
Start: 2023-12-29 | End: 2024-05-27 | Stop reason: ALTCHOICE

## 2024-04-16 VITALS
SYSTOLIC BLOOD PRESSURE: 120 MMHG | RESPIRATION RATE: 20 BRPM | OXYGEN SATURATION: 98 % | DIASTOLIC BLOOD PRESSURE: 66 MMHG | TEMPERATURE: 98 F | HEART RATE: 100 BPM

## 2024-04-16 NOTE — PROGRESS NOTES
[facility]  Transitional Care Management (TCM) Home Visit    Encounter Provider: Sanaz Isabel   PCP: Gianni Escalona MD  Consult Requested By: No ref. provider found  Admit Date: 3/15/24   IP Discharge Date: 3/21/24  Hospital Length of Stay:3/22/24  Hospital Diagnosis: No admission diagnoses are documented for this encounter.     HISTORY OF PRESENT ILLNESS      Patient ID: Yong Mcintyre is a 48 y.o. male was recently admitted to the hospital, this is their TCM encounter.    HPI:   Mr. Mcintyre is a 47 y/o male with a PMH of HFrEF, HTN, pHTN, Chronic hypoxic respiratory failure (baseline oxygen req at home is 3 L), PFO (unsuccessful closure in 2006), AF (on coumadin), Obesity hypoventilation syndrome, Morbid obesity presenting to the ED with abdominal bloating, dyspnea for the last 4 days, with weight gain of 10 lbs this week. His dry weight is 231 and this morning was weighing 246.Satting 82% on his home O2 of 3 L, currently satting 94% on 5-6L. Patient reports he has been having this epigastric bloating and associated belching that has been reoccurring. Patient reports that he visited his PCP, and suspected GERD and prescribed him Protonix with little improvement. Patient denies fever, N/V/D, no changes in BM, no increasing passing of gas, no chills, dysuria, flank pain, does endorse orthopnea. Patient does admit to SOB (seems to be chronic but worsens with bloating), belching, and feeling mildly nauseous. Patient has been adherent to medication regimen consisting diuretics and 2 g sodium diet. No new sick contacts. Performs ADL's and tries to works out.    In the ED /64, , RR 18, on 6L oxygen via NC, Labs significant for INR 2.3 (goal 2-3), Na 134, K 2.7, , troponin 0.042, Bun 91, Cr 2.4, PcO2 75.7, HCO3 48.1, he was given 80 lasix in ED with 500cc UOP so far also found to have long Qtc on repeat EKG.     Hospital Course:   Has been diuresing well on IV lasix drip with intermittent  diamox for metabolic alkalosis. Approaching dry weight. [facility] [place] inpatient due to severe pulmonary hypertension. They didn't have any new recommendations at this time. Transitioned from IV lasix to oral torsemide and metolazone. Patient has been diuresing well on oral diuretic. Was started on diamox inpatient for chronic metabolic acidosis. Was using 3-5 L of oxygen inpatient. Patient states he feel improved and light weight with all the fluid off of him. Will discharge on oral diuretic with outpatient labs and follow up with pulmonology, cardiology and  PCP. Medically stable for discharge.     Today: Reports no acute issues or concerns.  Reports taking meds as prescribed.  He is aware of upcoming follow up with PCP, pulmonology and nephrology.   VSS.  Appears euvolemic on exam.  Reports no current symptoms.  Denies chest pain, SOB, fevers, cough, congestion, change in bladder habits, change in bowel habits.        DECISION MAKING TODAY       Assessment & Plan:  1. Pulmonary hypertension  Assessment & Plan:  Chronic, stable  Continue meds as prescribed       2. MALLIKA (obstructive sleep apnea)  Assessment & Plan:  Stable    Continue bipap qhs       3. Stage 3 chronic kidney disease, unspecified whether stage 3a or 3b CKD  Assessment & Plan:  Stable, no acute issues  Avoid nephrotoxic agents       4. Chronic right-sided heart failure  Assessment & Plan:  Appear euvolemic  Denies chest pain, SOB  Continue medication as prescribed  Keep scheduled follow up appts  Activity and Diet restrictions:   Recommend 2-3 gram sodium restriction and 1500cc- 2000cc fluid restriction.  Encourage physical activity with graded exercise program.  Requested patient to weigh themselves daily, and to notify us if their weight increases by more than 3 lbs in 1 day or 5 lbs in 3 days.  Elevated lower extremities while sitting/lying, compression stockings        5. Primary hypertension  Assessment & Plan:  Stable  Continue meds as prescribed        6. Paroxysmal atrial fibrillation  Assessment & Plan:  No acute issues  Continue meds as prescribed       7. Severe obesity (BMI >= 40)  Assessment & Plan:  He has been educated on the negative effects of obesity to one's health and encouraged to consider lifestyle diet modifications and exercise.        8. Gastroesophageal reflux disease, unspecified whether esophagitis present  Assessment & Plan:  Stable  Continue PPI            Medication List on Discharge:     Medication List            Accurate as of April 2, 2024 11:59 PM. If you have any questions, ask your nurse or doctor.                CHANGE how you take these medications      pantoprazole 40 MG tablet  Commonly known as: PROTONIX  Take 1 tablet (40 mg total) by mouth once daily.  What changed: when to take this     warfarin 10 MG tablet  Commonly known as: COUMADIN  Take 1/2 tablet (5 mg) by mouth daily or as directed by Coumadin Clinic  What changed:   how much to take  how to take this  when to take this  additional instructions            CONTINUE taking these medications      albuterol 90 mcg/actuation inhaler  Commonly known as: VENTOLIN HFA  Inhale 2 puffs into the lungs every 4 (four) hours as needed for Wheezing. Rescue     allopurinoL 300 MG tablet  Commonly known as: ZYLOPRIM  Take 1 tablet (300 mg total) by mouth once daily.     b complex vitamins tablet  Take 1 tablet by mouth once daily.     CALCIUM ORAL  Take 1 capsule by mouth once daily.     fish oil-omega-3 fatty acids 300-1,000 mg capsule  Take 1 capsule by mouth once daily.     metOLazone 2.5 MG tablet  Commonly known as: ZAROXOLYN  Take 1 tablet (2.5 mg total) by mouth every Mon, Wed, Fri, Sun. Take 2.5 mg 4 times a week  and as needed in addition to that     ONE-A-DAY MEN'S COMPLETE ORAL  Take 1 tablet by mouth once daily.     potassium chloride 10 MEQ Cpsr  Commonly known as: MICRO-K  Take 10 mEq by mouth 2 (two) times daily.     tiotropium 18 mcg inhalation capsule  Commonly  known as: SPIRIVA  Inhale 18 mcg into the lungs once daily.     torsemide 20 MG Tab  Commonly known as: DEMADEX  Take 3 tablets (60 mg total) by mouth 2 (two) times a day.     TYLENOL ARTHRITIS ORAL  Take 2 tablets by mouth daily as needed (pain).     VISINE DRY EYE RELIEF 1 % Drop  Generic drug: polyethylene glycol 400  Place 2 drops into both eyes daily as needed (dry eyes).     VITAMIN C ORAL  Take 1 tablet by mouth once daily.     WIXELA INHUB 250-50 mcg/dose diskus inhaler  Generic drug: fluticasone-salmeterol 250-50 mcg/dose  1 puff 2 (two) times daily. USE 1 INHALATION BY MOUTH TWICE DAILY              Medication Reconciliation:  Were medications changed on discharge? No  Were medications in the home? Yes  Is the patient taking the medications as directed? Yes  Does the patient understand the medications and changes? Yes  Does updated med list accurately reflects meds patient is currently taking? Yes    ENVIRONMENT OF CARE      Family and/or Caregiver present at visit?  No  Name of Caregiver: n/a  History provided by: patient    Advance Care Planning   Advanced Care Planning Status:  Patient has had an ACP conversation  Living Will: No  Power of : No  [place]: No    Does Caregiver have HCPoA: No  Changes today: n/a  Is patient hospice appropriate: No  (If needed, use PPS <30 or FAST score >7)  Was referral to hospice placed: No    Palliative Care/Goals of care:Advanced Care Directives,  [employer] forms left in the home for family review, discussion and signing with instructions to return upon their next provider encounter for inclusion to the medical record. Discussed ACP for 20 minutes.          Impression upon entering the home:  Physical Dwelling: single family home   Appearance of home environment: cleaniness: clean  Functional Status: independent  Mobility: ambulatory  Nutritional access: adequate intake and access  Home Health: No, and does not need it at this time   DME/Supplies:  bipap       Diagnostic tests reviewed/disposition: No diagnosic tests pending after this hospitalization.  Disease/illness education: CHF  Establishment or re-establishment of referral orders for community resources: No other necessary community resources.   Discussion with other health care providers: No discussion with other health care providers necessary.   Does patient have a PCP at [place]? Yes   Repatriation plan with PCP? Care at Home reason: hospital follow up     Does patient have an ostomy (ileostomy, colostomy, suprapubic catheter, nephrostomy tube, tracheostomy, PEG tube, pleurex catheter, cholecystostomy, etc)? No  Were BPAs reviewed? No    Social History     Socioeconomic History    Marital status: Single   Tobacco Use    Smoking status: Never    Smokeless tobacco: Never   Substance and Sexual Activity    Alcohol use: Yes     Comment: holidays  rare    Drug use: No    Sexual activity: Not Currently     Partners: Female     Social Determinants of Health     Financial Resource Strain: Medium Risk (4/7/2024)    Overall Financial Resource Strain (CARDIA)     Difficulty of Paying Living Expenses: Somewhat hard   Food Insecurity: Food Insecurity Present (4/7/2024)    Hunger Vital Sign     Worried About Running Out of Food in the Last Year: Sometimes true     Ran Out of Food in the Last Year: Never true   Transportation Needs: No Transportation Needs (4/7/2024)    PRAPARE - Transportation     Lack of Transportation (Medical): No     Lack of Transportation (Non-Medical): No   Physical Activity: Insufficiently Active (4/7/2024)    Exercise Vital Sign     Days of Exercise per Week: 3 days     Minutes of Exercise per Session: 10 min   Stress: Stress Concern Present (4/7/2024)    [place] [employer] - Occupational Stress Questionnaire     Feeling of Stress : To some extent   Social Connections: Moderately Isolated (4/7/2024)    Social Connection and Isolation Panel [NHANES]     Frequency of Communication  with Friends and Family: More than three times a week     Frequency of Social Gatherings with Friends and Family: Twice a week     Attends Baptism Services: Never     Active Member of Clubs or Organizations: Yes     Attends Club or Organization Meetings: More than 4 times per year     Marital Status: Never    Housing Stability: Low Risk  (4/7/2024)    Housing Stability Vital Sign     Unable to Pay for Housing in the Last Year: No     Number of Places Lived in the Last Year: 1     Unstable Housing in the Last Year: No       OBJECTIVE:     Vital Signs:  Vitals:    04/02/24 1400   BP: 120/66   Pulse: 100   Resp: 20   Temp: 98.1 °F (36.7 °C)       Review of Systems   Constitutional:  Negative for chills and fever.   HENT:  Negative for congestion.    Eyes:  Negative for visual disturbance.   Respiratory:  Negative for chest tightness.    Cardiovascular:  Negative for chest pain, palpitations and leg swelling.   Gastrointestinal:  Negative for abdominal pain, diarrhea, nausea and vomiting.   Genitourinary:  Negative for difficulty urinating.   Musculoskeletal:  Negative for arthralgias and myalgias.   Skin:  Negative for color change and wound.   Neurological:  Negative for dizziness, weakness, light-headedness and headaches.   Hematological:  Does not bruise/bleed easily.   Psychiatric/Behavioral:  Negative for agitation.          Physical Exam  HENT:      Head: Normocephalic.      Nose: No congestion or rhinorrhea.      Mouth/Throat:      Mouth: Mucous membranes are moist.   Cardiovascular:      Rate and Rhythm: Normal rate.   Pulmonary:      Effort: No respiratory distress.      Breath sounds: Normal breath sounds.   Abdominal:      General: Bowel sounds are normal.      Palpations: Abdomen is soft.   Musculoskeletal:      Cervical back: Normal range of motion and neck supple.      Right lower leg: No edema.      Left lower leg: No edema.   Skin:     General: Skin is warm and dry.   Neurological:      Mental  Status: He is alert and oriented to person, place, and time. Mental status is at baseline.   Psychiatric:         Mood and Affect: Mood normal.       INSTRUCTIONS FOR PATIENT:     Scheduled Follow-up, Appts Reviewed with Modifications if Needed: No  Future Appointments   Date Time Provider Department Center   4/23/2024  8:00 AM Paige Jacobson MD [facility] [place]   7/1/2024  3:00 PM Kirt Moreau MD [facility] [place]       Signature: Sanaz Isabel NP    Transition of Care Visit:  I have reviewed and updated the history and problem list.  I have reconciled the medication list.  I have discussed the hospitalization and current medical issues, prognosis and plans with the patient/family.      Total face-to-face time was 60 min, >50% of this was spent on counseling and coordination of care. The following issues were discussed: primary and secondary diagnoses, co-morbidities, prescribed medications, treatment modalities, importance of compliance with medical advice and directives for follow-up care.

## 2024-04-16 NOTE — ASSESSMENT & PLAN NOTE
No acute issues  Continue meds as prescribed    [Yes] : Yes [No falls in past year] : Patient reported no falls in the past year [0] : 2) Feeling down, depressed, or hopeless: Not at all (0) [] : No [[id number]] : 0

## 2024-04-16 NOTE — ASSESSMENT & PLAN NOTE
Appear euvolemic  Denies chest pain, SOB  Continue medication as prescribed  Keep scheduled follow up appts  Activity and Diet restrictions:   Recommend 2-3 gram sodium restriction and 1500cc- 2000cc fluid restriction.  Encourage physical activity with graded exercise program.  Requested patient to weigh themselves daily, and to notify us if their weight increases by more than 3 lbs in 1 day or 5 lbs in 3 days.  Elevated lower extremities while sitting/lying, compression stockings

## 2024-04-17 DIAGNOSIS — N18.30 STAGE 3 CHRONIC KIDNEY DISEASE, UNSPECIFIED WHETHER STAGE 3A OR 3B CKD: Primary | Chronic | ICD-10-CM

## 2024-04-20 NOTE — PHYSICIAN QUERY
"Question: Please clarify the conflicting documentation in regards to the TYPE of heart failure         To respond, click "New Note" select your response and press enter   Provider Query Response:    Acute on Chronic Diastolic Heart Failure (HFpEF)      "

## 2024-04-22 ENCOUNTER — HOSPITAL ENCOUNTER (INPATIENT)
Facility: HOSPITAL | Age: 49
LOS: 5 days | Discharge: HOME HEALTH | DRG: 291 | End: 2024-04-27
Attending: STUDENT IN AN ORGANIZED HEALTH CARE EDUCATION/TRAINING PROGRAM | Admitting: STUDENT IN AN ORGANIZED HEALTH CARE EDUCATION/TRAINING PROGRAM
Payer: MEDICARE

## 2024-04-22 ENCOUNTER — OFFICE VISIT (OUTPATIENT)
Dept: PRIMARY CARE CLINIC | Facility: CLINIC | Age: 49
DRG: 291 | End: 2024-04-22
Payer: MEDICARE

## 2024-04-22 DIAGNOSIS — I50.812 CHRONIC RIGHT-SIDED HEART FAILURE: Chronic | ICD-10-CM

## 2024-04-22 DIAGNOSIS — Q21.12 PFO (PATENT FORAMEN OVALE): Chronic | ICD-10-CM

## 2024-04-22 DIAGNOSIS — N18.32 STAGE 3B CHRONIC KIDNEY DISEASE: Chronic | ICD-10-CM

## 2024-04-22 DIAGNOSIS — I95.9 HYPOTENSION, UNSPECIFIED HYPOTENSION TYPE: Primary | ICD-10-CM

## 2024-04-22 DIAGNOSIS — I51.9 RIGHT VENTRICULAR DYSFUNCTION: ICD-10-CM

## 2024-04-22 DIAGNOSIS — R07.9 CHEST PAIN: ICD-10-CM

## 2024-04-22 DIAGNOSIS — I50.32 CHRONIC DIASTOLIC HEART FAILURE: Primary | ICD-10-CM

## 2024-04-22 DIAGNOSIS — I10 PRIMARY HYPERTENSION: ICD-10-CM

## 2024-04-22 DIAGNOSIS — J96.12 CHRONIC RESPIRATORY FAILURE WITH HYPOXIA AND HYPERCAPNIA: ICD-10-CM

## 2024-04-22 DIAGNOSIS — J96.11 CHRONIC RESPIRATORY FAILURE WITH HYPOXIA AND HYPERCAPNIA: ICD-10-CM

## 2024-04-22 DIAGNOSIS — I48.0 PAROXYSMAL ATRIAL FIBRILLATION: ICD-10-CM

## 2024-04-22 DIAGNOSIS — I50.32 CHRONIC DIASTOLIC HEART FAILURE: ICD-10-CM

## 2024-04-22 DIAGNOSIS — Z99.81 OXYGEN DEPENDENT: ICD-10-CM

## 2024-04-22 DIAGNOSIS — I27.9 CHRONIC PULMONARY HEART DISEASE: ICD-10-CM

## 2024-04-22 PROBLEM — N17.9 ACUTE KIDNEY INJURY SUPERIMPOSED ON CKD: Status: RESOLVED | Noted: 2019-12-07 | Resolved: 2024-04-22

## 2024-04-22 PROBLEM — I48.91 ON COUMADIN FOR ATRIAL FIBRILLATION: Status: ACTIVE | Noted: 2024-04-22

## 2024-04-22 PROBLEM — I48.91 ON COUMADIN FOR ATRIAL FIBRILLATION: Chronic | Status: ACTIVE | Noted: 2024-04-22

## 2024-04-22 PROBLEM — N18.9 ACUTE KIDNEY INJURY SUPERIMPOSED ON CKD: Status: RESOLVED | Noted: 2019-12-07 | Resolved: 2024-04-22

## 2024-04-22 PROBLEM — Z79.01 ON COUMADIN FOR ATRIAL FIBRILLATION: Chronic | Status: ACTIVE | Noted: 2024-04-22

## 2024-04-22 PROBLEM — Z79.01 ON COUMADIN FOR ATRIAL FIBRILLATION: Status: ACTIVE | Noted: 2024-04-22

## 2024-04-22 LAB
ALBUMIN SERPL BCP-MCNC: 2.9 G/DL (ref 3.5–5.2)
ALLENS TEST: NORMAL
ALP SERPL-CCNC: 155 U/L (ref 55–135)
ALT SERPL W/O P-5'-P-CCNC: 28 U/L (ref 10–44)
ANION GAP SERPL CALC-SCNC: 13 MMOL/L (ref 8–16)
AST SERPL-CCNC: 39 U/L (ref 10–40)
BASOPHILS # BLD AUTO: 0.05 K/UL (ref 0–0.2)
BASOPHILS NFR BLD: 0.7 % (ref 0–1.9)
BILIRUB SERPL-MCNC: 0.9 MG/DL (ref 0.1–1)
BNP SERPL-MCNC: 892 PG/ML (ref 0–99)
BUN SERPL-MCNC: 89 MG/DL (ref 6–20)
CALCIUM SERPL-MCNC: 9.2 MG/DL (ref 8.7–10.5)
CHLORIDE SERPL-SCNC: 87 MMOL/L (ref 95–110)
CO2 SERPL-SCNC: 35 MMOL/L (ref 23–29)
CREAT SERPL-MCNC: 2.2 MG/DL (ref 0.5–1.4)
DIFFERENTIAL METHOD BLD: ABNORMAL
EOSINOPHIL # BLD AUTO: 0 K/UL (ref 0–0.5)
EOSINOPHIL NFR BLD: 0.3 % (ref 0–8)
ERYTHROCYTE [DISTWIDTH] IN BLOOD BY AUTOMATED COUNT: 20.4 % (ref 11.5–14.5)
EST. GFR  (NO RACE VARIABLE): 36 ML/MIN/1.73 M^2
GLUCOSE SERPL-MCNC: 105 MG/DL (ref 70–110)
HCT VFR BLD AUTO: 57.5 % (ref 40–54)
HGB BLD-MCNC: 16.6 G/DL (ref 14–18)
IMM GRANULOCYTES # BLD AUTO: 0.02 K/UL (ref 0–0.04)
IMM GRANULOCYTES NFR BLD AUTO: 0.3 % (ref 0–0.5)
INR PPP: 2.4 (ref 0.8–1.2)
LACTATE SERPL-SCNC: 1.1 MMOL/L (ref 0.5–2.2)
LDH SERPL L TO P-CCNC: 2 MMOL/L (ref 0.5–2.2)
LYMPHOCYTES # BLD AUTO: 1 K/UL (ref 1–4.8)
LYMPHOCYTES NFR BLD: 13.6 % (ref 18–48)
MCH RBC QN AUTO: 25.7 PG (ref 27–31)
MCHC RBC AUTO-ENTMCNC: 28.9 G/DL (ref 32–36)
MCV RBC AUTO: 89 FL (ref 82–98)
MONOCYTES # BLD AUTO: 0.6 K/UL (ref 0.3–1)
MONOCYTES NFR BLD: 8.2 % (ref 4–15)
NEUTROPHILS # BLD AUTO: 5.9 K/UL (ref 1.8–7.7)
NEUTROPHILS NFR BLD: 76.9 % (ref 38–73)
NRBC BLD-RTO: 0 /100 WBC
PLATELET # BLD AUTO: 197 K/UL (ref 150–450)
PMV BLD AUTO: 10.6 FL (ref 9.2–12.9)
POC CARDIAC TROPONIN I: 0.01 NG/ML (ref 0–0.08)
POC CARDIAC TROPONIN I: 0.01 NG/ML (ref 0–0.08)
POTASSIUM SERPL-SCNC: 3.2 MMOL/L (ref 3.5–5.1)
PROT SERPL-MCNC: 8.1 G/DL (ref 6–8.4)
PROTHROMBIN TIME: 25.3 SEC (ref 9–12.5)
RBC # BLD AUTO: 6.46 M/UL (ref 4.6–6.2)
SAMPLE: NORMAL
SITE: NORMAL
SODIUM SERPL-SCNC: 135 MMOL/L (ref 136–145)
TROPONIN I SERPL DL<=0.01 NG/ML-MCNC: 0.02 NG/ML (ref 0–0.03)
TROPONIN I SERPL DL<=0.01 NG/ML-MCNC: 0.02 NG/ML (ref 0–0.03)
TSH SERPL DL<=0.005 MIU/L-ACNC: 3.21 UIU/ML (ref 0.4–4)
WBC # BLD AUTO: 7.66 K/UL (ref 3.9–12.7)

## 2024-04-22 PROCEDURE — 99215 OFFICE O/P EST HI 40 MIN: CPT | Mod: 95,,, | Performed by: NURSE PRACTITIONER

## 2024-04-22 PROCEDURE — 99285 EMERGENCY DEPT VISIT HI MDM: CPT | Mod: 25

## 2024-04-22 PROCEDURE — 85610 PROTHROMBIN TIME: CPT | Performed by: STUDENT IN AN ORGANIZED HEALTH CARE EDUCATION/TRAINING PROGRAM

## 2024-04-22 PROCEDURE — 85025 COMPLETE CBC W/AUTO DIFF WBC: CPT | Mod: 91 | Performed by: STUDENT IN AN ORGANIZED HEALTH CARE EDUCATION/TRAINING PROGRAM

## 2024-04-22 PROCEDURE — 84484 ASSAY OF TROPONIN QUANT: CPT | Mod: 91 | Performed by: STUDENT IN AN ORGANIZED HEALTH CARE EDUCATION/TRAINING PROGRAM

## 2024-04-22 PROCEDURE — 99900035 HC TECH TIME PER 15 MIN (STAT)

## 2024-04-22 PROCEDURE — 84484 ASSAY OF TROPONIN QUANT: CPT

## 2024-04-22 PROCEDURE — 93010 ELECTROCARDIOGRAM REPORT: CPT | Mod: ,,, | Performed by: INTERNAL MEDICINE

## 2024-04-22 PROCEDURE — 27000221 HC OXYGEN, UP TO 24 HOURS

## 2024-04-22 PROCEDURE — 3044F HG A1C LEVEL LT 7.0%: CPT | Mod: CPTII,95,, | Performed by: NURSE PRACTITIONER

## 2024-04-22 PROCEDURE — 63600175 PHARM REV CODE 636 W HCPCS

## 2024-04-22 PROCEDURE — 83880 ASSAY OF NATRIURETIC PEPTIDE: CPT | Performed by: STUDENT IN AN ORGANIZED HEALTH CARE EDUCATION/TRAINING PROGRAM

## 2024-04-22 PROCEDURE — 25000003 PHARM REV CODE 250

## 2024-04-22 PROCEDURE — 83605 ASSAY OF LACTIC ACID: CPT | Performed by: STUDENT IN AN ORGANIZED HEALTH CARE EDUCATION/TRAINING PROGRAM

## 2024-04-22 PROCEDURE — 12000002 HC ACUTE/MED SURGE SEMI-PRIVATE ROOM

## 2024-04-22 PROCEDURE — 1160F RVW MEDS BY RX/DR IN RCRD: CPT | Mod: CPTII,95,, | Performed by: NURSE PRACTITIONER

## 2024-04-22 PROCEDURE — 84443 ASSAY THYROID STIM HORMONE: CPT | Performed by: STUDENT IN AN ORGANIZED HEALTH CARE EDUCATION/TRAINING PROGRAM

## 2024-04-22 PROCEDURE — 93005 ELECTROCARDIOGRAM TRACING: CPT

## 2024-04-22 PROCEDURE — 94761 N-INVAS EAR/PLS OXIMETRY MLT: CPT

## 2024-04-22 PROCEDURE — 83605 ASSAY OF LACTIC ACID: CPT

## 2024-04-22 PROCEDURE — 1159F MED LIST DOCD IN RCRD: CPT | Mod: CPTII,95,, | Performed by: NURSE PRACTITIONER

## 2024-04-22 PROCEDURE — 80053 COMPREHEN METABOLIC PANEL: CPT | Performed by: STUDENT IN AN ORGANIZED HEALTH CARE EDUCATION/TRAINING PROGRAM

## 2024-04-22 RX ORDER — SODIUM CHLORIDE 0.9 % (FLUSH) 0.9 %
10 SYRINGE (ML) INJECTION EVERY 12 HOURS PRN
Status: DISCONTINUED | OUTPATIENT
Start: 2024-04-22 | End: 2024-04-27 | Stop reason: HOSPADM

## 2024-04-22 RX ORDER — OMEGA-3/DHA/EPA/FISH OIL 300-1000MG
1 CAPSULE,DELAYED RELEASE (ENTERIC COATED) ORAL DAILY
Status: DISCONTINUED | OUTPATIENT
Start: 2024-04-23 | End: 2024-04-27 | Stop reason: HOSPADM

## 2024-04-22 RX ORDER — GLUCAGON 1 MG
1 KIT INJECTION
Status: DISCONTINUED | OUTPATIENT
Start: 2024-04-22 | End: 2024-04-27 | Stop reason: HOSPADM

## 2024-04-22 RX ORDER — ALUMINUM HYDROXIDE, MAGNESIUM HYDROXIDE, AND SIMETHICONE 1200; 120; 1200 MG/30ML; MG/30ML; MG/30ML
30 SUSPENSION ORAL 4 TIMES DAILY PRN
Status: DISCONTINUED | OUTPATIENT
Start: 2024-04-22 | End: 2024-04-27 | Stop reason: HOSPADM

## 2024-04-22 RX ORDER — FUROSEMIDE 10 MG/ML
80 INJECTION INTRAMUSCULAR; INTRAVENOUS 2 TIMES DAILY
Status: DISCONTINUED | OUTPATIENT
Start: 2024-04-22 | End: 2024-04-24

## 2024-04-22 RX ORDER — WARFARIN SODIUM 5 MG/1
5 TABLET ORAL DAILY
Status: DISCONTINUED | OUTPATIENT
Start: 2024-04-23 | End: 2024-04-27 | Stop reason: HOSPADM

## 2024-04-22 RX ORDER — PANTOPRAZOLE SODIUM 40 MG/1
40 TABLET, DELAYED RELEASE ORAL DAILY
Status: DISCONTINUED | OUTPATIENT
Start: 2024-04-23 | End: 2024-04-27 | Stop reason: HOSPADM

## 2024-04-22 RX ORDER — ALBUTEROL SULFATE 90 UG/1
2 AEROSOL, METERED RESPIRATORY (INHALATION) EVERY 4 HOURS PRN
Status: DISCONTINUED | OUTPATIENT
Start: 2024-04-23 | End: 2024-04-27 | Stop reason: HOSPADM

## 2024-04-22 RX ORDER — ENOXAPARIN SODIUM 100 MG/ML
40 INJECTION SUBCUTANEOUS EVERY 24 HOURS
Status: DISCONTINUED | OUTPATIENT
Start: 2024-04-22 | End: 2024-04-22

## 2024-04-22 RX ORDER — NALOXONE HCL 0.4 MG/ML
0.02 VIAL (ML) INJECTION
Status: DISCONTINUED | OUTPATIENT
Start: 2024-04-22 | End: 2024-04-27 | Stop reason: HOSPADM

## 2024-04-22 RX ORDER — IBUPROFEN 200 MG
24 TABLET ORAL
Status: DISCONTINUED | OUTPATIENT
Start: 2024-04-22 | End: 2024-04-27 | Stop reason: HOSPADM

## 2024-04-22 RX ORDER — TALC
6 POWDER (GRAM) TOPICAL NIGHTLY PRN
Status: DISCONTINUED | OUTPATIENT
Start: 2024-04-22 | End: 2024-04-27 | Stop reason: HOSPADM

## 2024-04-22 RX ORDER — ALLOPURINOL 100 MG/1
300 TABLET ORAL DAILY
Status: DISCONTINUED | OUTPATIENT
Start: 2024-04-23 | End: 2024-04-27 | Stop reason: HOSPADM

## 2024-04-22 RX ORDER — ACETAMINOPHEN 500 MG
1000 TABLET ORAL EVERY 8 HOURS PRN
Status: DISCONTINUED | OUTPATIENT
Start: 2024-04-22 | End: 2024-04-27 | Stop reason: HOSPADM

## 2024-04-22 RX ORDER — IBUPROFEN 200 MG
16 TABLET ORAL
Status: DISCONTINUED | OUTPATIENT
Start: 2024-04-22 | End: 2024-04-27 | Stop reason: HOSPADM

## 2024-04-22 RX ADMIN — ENOXAPARIN SODIUM 40 MG: 40 INJECTION SUBCUTANEOUS at 09:04

## 2024-04-22 RX ADMIN — ACETAMINOPHEN 1000 MG: 500 TABLET ORAL at 09:04

## 2024-04-22 NOTE — ED TRIAGE NOTES
Arrives via POV c/o SOB and hypotension starting today. PT wears 3L NC at home at baseline stating they have a hole in their heart that requires them to wear oxygen. Endorses CP.

## 2024-04-22 NOTE — Clinical Note
I sent patient to ER for eval of hypotension, I also put in a referral to Cardiology, I told him there are cardiologist in your building we can help him schedule with

## 2024-04-22 NOTE — PROGRESS NOTES
The patient location is: [place]   The chief complaint leading to consultation is:  Low blood pressure    Visit type: audiovisual    Face to Face time with patient: 15  26 minutes of total time spent on the encounter, which includes face to face time and non-face to face time preparing to see the patient (eg, review of tests), Obtaining and/or reviewing separately obtained history, Documenting clinical information in the electronic or other health record, Independently interpreting results (not separately reported) and communicating results to the patient/family/caregiver, or Care coordination (not separately reported).         Each patient to whom he or she provides medical services by telemedicine is:  (1) informed of the relationship between the physician and patient and the respective role of any other health care provider with respect to management of the patient; and (2) notified that he or she may decline to receive medical services by telemedicine and may withdraw from such care at any time.    Notes:   Subjective     Patient ID: Yong Mcintyre is a 48 y.o. male.    Chief Complaint: No chief complaint on file.    Hypertension  This is a chronic problem. The current episode started more than 1 year ago. The problem has been waxing and waning since onset. The problem is resistant. Associated symptoms include anxiety, malaise/fatigue, orthopnea and shortness of breath. Pertinent negatives include no blurred vision, chest pain, headaches, neck pain, palpitations, peripheral edema, PND or sweats. Agents associated with hypertension include amphetamines. Risk factors for coronary artery disease include family history and obesity. Past treatments include diuretics. The current treatment provides no improvement. There are no compliance problems.        This is a 40 year old male patient of Dr. Escalona with chronic conditions of oxygen-dependent, chronic respiratory failure, chronic asthmatic bronchitis, right ventricle  dysfunction, pulmonary hypertension, PFO, AFib, hypertension, chronic right-sided heart failure, who is new to me and presents with complaints of low blood pressure, has been getting pressures of in the 80s over the 40s.  Patient takes furosemide 60 mg twice a day and Zaroxolyn 2.5 mg every other day.  Patient reports is drinking for fluid restriction, is on 3 L continuous oxygen.  Looks like his blood pressure stays low but he reports it is starting to drop lower  Reports no increased shortness of breath, does get very tired, reports is urinating his norm.  Does not have a cardiologist  [place] [facility] [place]    Discussed blood pressure with patient in due to his complicated medical history I think he should go to the ER for eval, and possible med adjustments  I discussed that we need to get him to follow up with the cardiologist to help manage      Review of Systems   Constitutional:  Positive for activity change, fatigue and malaise/fatigue. Negative for appetite change.   Eyes:  Negative for blurred vision.   Respiratory:  Positive for shortness of breath. Negative for cough and chest tightness.    Cardiovascular:  Positive for orthopnea. Negative for chest pain, palpitations and PND.   Musculoskeletal:  Negative for neck pain.   Neurological:  Positive for weakness. Negative for dizziness, syncope, light-headedness and headaches.          Objective     Physical Exam  Constitutional:       General: He is not in acute distress.     Appearance: Normal appearance. He is ill-appearing.   HENT:      Head: Normocephalic and atraumatic.   Cardiovascular:      Comments: No signs and symptoms of distress but blood pressure getting too low  Pulmonary:      Effort: Pulmonary effort is normal.      Comments: While sitting on couch seems to be comfortable on 3 L O2 continuous  Neurological:      Mental Status: He is alert and oriented to person, place, and time.   Psychiatric:         Mood and Affect: Mood normal.          Behavior: Behavior normal.         Thought Content: Thought content normal.         Judgment: Judgment normal.            Assessment and Plan     1. Hypotension, unspecified hypotension type    2. Right ventricular dysfunction    3. PFO (patent foramen ovale)    4. Paroxysmal atrial fibrillation    5. Primary hypertension    6. Chronic right-sided heart failure    7. Chronic pulmonary heart disease, unspecified    8. Chronic diastolic heart failure    9. Oxygen dependent    10. Chronic respiratory failure with hypoxia and hypercapnia        Patient will go to ER for further evaluation         No follow-ups on file.

## 2024-04-22 NOTE — ED PROVIDER NOTES
"Chief Complaint   Shortness of Breath (Bp was low at home , on home oxygen 3l )      History Of Present Illness   Yong Mcintyre is a 48 y.o. male with a PMHx including HFpEF, HTN, pHTN, chronic hypoxic respiratory failure (on 3 L NC O2 at baseline), PFO (unsuccessful closure in 2006), AF (on coumadin), Obesity hypoventilation syndrome  presenting with SOB and hypotension.  Patient states that he was feeling more short of breath today.  States that he takes his blood pressure at home every day and noticed that his blood pressure was lower than typical.  States that it was 80s over 50s.  States he did a virtual PCP visit and was advised to come to the ED.  States he also got labs done earlier today.  On arrival, patient is satting in the mid 70s on 3 L nasal cannula.   Patient was recently discharged from the hospital on 3/twenty-one after being admitted for acute on chronic hypoxic respiratory failure.  He was aggressively diuresed during admission and then change to p.o. torsemide and p.o. metolazone. Discharge weight was 225 lb 15.5 oz       Independent Historian: Yes  Other Historian or Collateral: Chart review  Interpretor: No      Review of patient's allergies indicates:   Allergen Reactions    Lipitor [atorvastatin] Other (See Comments)     "it makes my legs feel like jelly"  Other reaction(s): causes legs to hurt       No current facility-administered medications on file prior to encounter.     Current Outpatient Medications on File Prior to Encounter   Medication Sig Dispense Refill    acetaminophen (TYLENOL ARTHRITIS ORAL) Take 2 tablets by mouth daily as needed (pain).      albuterol (VENTOLIN HFA) 90 mcg/actuation inhaler Inhale 2 puffs into the lungs every 4 (four) hours as needed for Wheezing. Rescue 18 g 2    allopurinoL (ZYLOPRIM) 300 MG tablet Take 1 tablet (300 mg total) by mouth once daily. 90 tablet 3    ascorbic acid (VITAMIN C ORAL) Take 1 tablet by mouth once daily.      b complex vitamins " tablet Take 1 tablet by mouth once daily.      CALCIUM ORAL Take 1 capsule by mouth once daily.      metOLazone (ZAROXOLYN) 2.5 MG tablet Take 1 tablet (2.5 mg total) by mouth every Mon, Wed, Fri, Sun. Take 2.5 mg 4 times a week  and as needed in addition to that 16 tablet 11    multivit,calc,min/FA/K1/lycop (ONE-A-DAY MEN'S COMPLETE ORAL) Take 1 tablet by mouth once daily.      omega-3 fatty acids/fish oil (FISH OIL-OMEGA-3 FATTY ACIDS) 300-1,000 mg capsule Take 1 capsule by mouth once daily.      pantoprazole (PROTONIX) 40 MG tablet Take 1 tablet (40 mg total) by mouth once daily. (Patient taking differently: Take 40 mg by mouth every morning.) 90 tablet 3    polyethylene glycol 400 (VISINE DRY EYE RELIEF) 1 % Drop Place 2 drops into both eyes daily as needed (dry eyes).      potassium chloride (MICRO-K) 10 MEQ CpSR Take 10 mEq by mouth 2 (two) times daily.      SPIKEVAX 0122-2923,12Y UP,,PF, 50 mcg/0.5 mL injection Inject 0.5 mLs into the muscle.      tiotropium (SPIRIVA) 18 mcg inhalation capsule Inhale 18 mcg into the lungs once daily.      torsemide (DEMADEX) 20 MG Tab Take 3 tablets (60 mg total) by mouth 2 (two) times a day. 270 tablet 3    warfarin (COUMADIN) 10 MG tablet Take 1/2 tablet (5 mg) by mouth daily or as directed by [facility] (Patient taking differently: Take 5 mg by mouth Daily. Or as directed by the coumadin clinic.) 30 tablet 3    WIXELA INHUB 250-50 mcg/dose diskus inhaler 1 puff 2 (two) times daily. USE 1 INHALATION BY MOUTH TWICE DAILY      [DISCONTINUED] sildenafil (REVATIO) 20 mg Tab Take 1 tablet (20 mg total) by mouth 3 (three) times daily. 270 tablet prn       Past History   As per HPI and below:  Past Medical History:   Diagnosis Date    Arthritis     CHF (congestive heart failure)     Diastolic dysfunction    Cor pulmonale 11/05/2012    Gallstones     Hypertension     Morbid obesity 11/05/2012    Obesity hypoventilation syndrome     On home oxygen therapy 03/07/2014    MALLIKA  (obstructive sleep apnea)     BPAP 16/11 with 3 L at night (continuous O2).    Paroxysmal atrial fibrillation     PFO (patent foramen ovale) 11/05/2012    status post closure    Pickwickian syndrome 11/05/2012    Pulmonary hypertension      Past Surgical History:   Procedure Laterality Date    RIGHT HEART CATHETERIZATION Right 3/22/2022    Procedure: INSERTION, CATHETER, RIGHT HEART;  Surgeon: Tony Martínez MD;  Location: [facility] CATH LAB;  Service: Cardiology;  Laterality: Right;    RIGHT HEART CATHETERIZATION Right 1/31/2024    Procedure: INSERTION, CATHETER, RIGHT HEART;  Surgeon: Sheri Ritter MD;  Location: [facility] CATH LAB;  Service: Cardiology;  Laterality: Right;       Social History     Socioeconomic History    Marital status: Single   Tobacco Use    Smoking status: Never    Smokeless tobacco: Never   Substance and Sexual Activity    Alcohol use: Yes     Comment: holidays  rare    Drug use: No    Sexual activity: Not Currently     Partners: Female     Social Determinants of Health     Financial Resource Strain: Medium Risk (4/7/2024)    Overall Financial Resource Strain (CARDIA)     Difficulty of Paying Living Expenses: Somewhat hard   Food Insecurity: Food Insecurity Present (4/7/2024)    Hunger Vital Sign     Worried About Running Out of Food in the Last Year: Sometimes true     Ran Out of Food in the Last Year: Never true   Transportation Needs: No Transportation Needs (4/7/2024)    PRAPARE - Transportation     Lack of Transportation (Medical): No     Lack of Transportation (Non-Medical): No   Physical Activity: Insufficiently Active (4/7/2024)    Exercise Vital Sign     Days of Exercise per Week: 3 days     Minutes of Exercise per Session: 10 min   Stress: Stress Concern Present (4/7/2024)    [employer] [place] [employer] - Occupational Stress Questionnaire     Feeling of Stress : To some extent   Social Connections: Moderately Isolated (4/7/2024)    Social Connection and Isolation Panel  [NHANES]     Frequency of Communication with Friends and Family: More than three times a week     Frequency of Social Gatherings with Friends and Family: Twice a week     Attends [employer] Services: Never     Active Member of Clubs or Organizations: Yes     Attends Club or Organization Meetings: More than 4 times per year     Marital Status: Never    Housing Stability: Low Risk  (4/7/2024)    Housing Stability Vital Sign     Unable to Pay for Housing in the Last Year: No     Number of Places Lived in the Last Year: 1     Unstable Housing in the Last Year: No       Family History   Problem Relation Name Age of Onset    Arthritis Mother      Asthma Mother      Hypertension Father      Asthma Sister      Arthritis Maternal Grandmother      Asthma Maternal Grandmother      Diabetes Maternal Grandmother      Hypertension Maternal Grandmother      Arthritis Maternal Grandfather      Hypertension Maternal Grandfather      Hypertension Paternal Grandfather      Breast cancer Paternal Grandmother      Down syndrome Brother      Gout Brother         Physical Exam     Vitals:    04/22/24 2017 04/22/24 2025 04/22/24 2046 04/22/24 2047   BP: (!) 101/57   (!) 98/57   Pulse:  108 104    Resp:  20 18    Temp:       TempSrc:       SpO2:  95% (!) 92%    Weight:       Height:           Physical Exam  Constitutional:       General: He is in acute distress.      Appearance: He is not toxic-appearing.   HENT:      Head: Normocephalic.      Right Ear: External ear normal.      Left Ear: External ear normal.      Nose: Nose normal.      Mouth/Throat:      Mouth: Mucous membranes are moist.   Eyes:      General: No scleral icterus.     Pupils: Pupils are equal, round, and reactive to light.   Cardiovascular:      Rate and Rhythm: Regular rhythm. Tachycardia present.      Heart sounds: No murmur heard.  Pulmonary:      Effort: Tachypnea and respiratory distress present.      Breath sounds: No wheezing, rhonchi or rales.      Comments:  Satting in the 70s on 3 L nasal cannula, changed to 15 L by non-rebreather.  Abdominal:      General: There is no distension.      Tenderness: There is no abdominal tenderness. There is no guarding.   Musculoskeletal:      Cervical back: No rigidity.      Right lower leg: No edema.      Left lower leg: No edema.   Skin:     General: Skin is warm and dry.      Capillary Refill: Capillary refill takes less than 2 seconds.   Neurological:      General: No focal deficit present.      Mental Status: He is alert and oriented to person, place, and time.             Results     Labs Reviewed   CBC W/ AUTO DIFFERENTIAL - Abnormal; Notable for the following components:       Result Value    RBC 6.46 (*)     Hematocrit 57.5 (*)     MCH 25.7 (*)     MCHC 28.9 (*)     RDW 20.4 (*)     Gran % 76.9 (*)     Lymph % 13.6 (*)     All other components within normal limits    Narrative:     Add on TSH per Dr. Ernst @ 18:31 pm to order # [id number]   COMPREHENSIVE METABOLIC PANEL - Abnormal; Notable for the following components:    Sodium 135 (*)     Potassium 3.2 (*)     Chloride 87 (*)     CO2 35 (*)     BUN 89 (*)     Creatinine 2.2 (*)     Albumin 2.9 (*)     Alkaline Phosphatase 155 (*)     eGFR 36.0 (*)     All other components within normal limits    Narrative:     Add on TSH per Dr. Ernst @ 18:31 pm to order # [id number]   B-TYPE NATRIURETIC PEPTIDE - Abnormal; Notable for the following components:     (*)     All other components within normal limits    Narrative:     Add on TSH per Dr. Ernst @ 18:31 pm to order # [id number]   TROPONIN I    Narrative:     Add on TSH per Dr. Ernst @ 18:31 pm to order # [id number]   LACTIC ACID, PLASMA   TSH   TROPONIN ISTAT   TSH    Narrative:     Add on TSH per Dr. Ernst @ 18:31 pm to order # [id number]   TROPONIN ISTAT   TROPONIN I   PROTIME-INR   ISTAT LACTATE   POCT TROPONIN   POCT TROPONIN       Imaging Results              X-Ray Chest AP Portable (Final result)   Result time 04/22/24 19:52:32      Final result by Axel Beck MD (04/22/24 19:52:32)                   Impression:      Pulmonary findings suggesting edema or other nonspecific pneumonitis, to a slightly lesser degree from 03/15/2024.      Electronically signed by: Axel Beck MD  Date:    04/22/2024  Time:    19:52               Narrative:    EXAMINATION:  XR CHEST AP PORTABLE    CLINICAL HISTORY:  Chest Pain;    TECHNIQUE:  Single frontal view of the chest was performed.    COMPARISON:  Serrated 0 graft most recent 03/15/2024, CT thorax 05/03/2024    FINDINGS:  Monitoring leads overlie the chest.  Patient is slightly rotated    Cardiomediastinal silhouette is prominent similar in configuration to the previous exam.  No pleural effusion or pneumothorax.  No lobar consolidation.  The lungs are symmetrically expanded bilateral with patchy increased interstitial and parenchymal attenuation perihilar and basilar distribution the that may reflect pulmonary edema, to a slightly lesser degree from prior.  Hilar contours are unchanged.  No acute osseous process seen.  PA and lateral views can be obtained.                                            Initial MDM   Medical Decision Making  Patient is a 48-year-old male presenting with shortness of breath and reported hypotension at home.  Patient's blood pressure in the ED is his baseline.  Patient is acutely hypoxic though even on his home O2.  Most concerning for infection, worsened pulmonary hypertension, acute on chronic heart failure.  Consider PE though lower suspicion at this time.  Will get broad workup including chest x-ray, and labs.  Bedside echo shows dilated right ventricle consistent with prior echo in the chart.    Amount and/or Complexity of Data Reviewed  Labs: ordered. Decision-making details documented in ED Course.    Risk  Decision regarding hospitalization.               Medications Given / Interventions     Medications   sodium chloride 0.9% flush  10 mL (has no administration in time range)   naloxone 0.4 mg/mL injection 0.02 mg (has no administration in time range)   glucose chewable tablet 16 g (has no administration in time range)   glucose chewable tablet 24 g (has no administration in time range)   glucagon (human recombinant) injection 1 mg (has no administration in time range)   enoxaparin injection 40 mg (has no administration in time range)   melatonin tablet 6 mg (has no administration in time range)   aluminum-magnesium hydroxide-simethicone 200-200-20 mg/5 mL suspension 30 mL (has no administration in time range)   dextrose 10% bolus 125 mL 125 mL (has no administration in time range)   dextrose 10% bolus 250 mL 250 mL (has no administration in time range)       Critical Care    Date/Time: 4/22/2024 6:27 PM    Performed by: María Ernst MD  Authorized by: María Ernst MD  Total critical care time (exclusive of procedural time) : 45 minutes  Critical care time was exclusive of separately billable procedures and treating other patients and teaching time.  Critical care was necessary to treat or prevent imminent or life-threatening deterioration of the following conditions: respiratory failure.  Critical care was time spent personally by me on the following activities: development of treatment plan with patient or surrogate, discussions with consultants, evaluation of patient's response to treatment, examination of patient, obtaining history from patient or surrogate, ordering and performing treatments and interventions, ordering and review of laboratory studies, ordering and review of radiographic studies, pulse oximetry, re-evaluation of patient's condition and review of old charts.           ED POCUS Performed: Yes - Dilated R heart with diastolic septal flattening, IVC dilated with minimal respiratory variation.  Left ventricular systolic function grossly okay by visual estimation.    Reassessment and ED Course     ED Course as of  04/22/24 2132 Mon Apr 22, 2024 1845 POC Lactate: 2.00 [CH]   1845 SpO2(!): 94 %  On 12 L high-flow nasal cannula [CH]   1852 POC Cardiac Troponin I: 0.01 [CH]   1902 BNP(!): 892  Increased from 2 weeks ago [CH]   1903 Comprehensive metabolic panel(!)  Minimal hypokalemia, stable CKD. [CH]   1932 Lactic Acid Level: 1.1 [CH]   1932 TSH: 3.208 [CH]   1932 Troponin I: 0.018 [CH]   1932 CBC grossly ok [CH]   2131 Able to down titrate patient's oxygen to 7 L high-flow nasal cannula. [CH]   2131 Chest x-ray shows some pulmonary edema but improved from prior. [CH]   2131 Discussed with hospital medicine who will evaluate the patient for further management. [CH]      ED Course User Index  [CH] María Ernst MD              Final diagnoses:  [R07.9] Chest pain           Dispo      ED Disposition Condition    Admit                              María Ernst MD  04/22/24 2132       María Ernst MD  05/02/24 1910

## 2024-04-22 NOTE — FIRST PROVIDER EVALUATION
"Medical screening exam completed.  I have conducted a focused provider triage encounter, findings are as follows:    Brief history of present illness:  Here for sob, low BP at home. On 3 L home oxygen. No cough or fever. Reports BP 70-90s systolic at home.    Vitals:    04/22/24 1800   BP: 101/60   Pulse: (!) 115   Resp: (!) 28   Temp: 98.5 °F (36.9 °C)   TempSrc: Oral   SpO2: (!) 80%   Weight: 103 kg (227 lb)   Height: 5' 5" (1.651 m)       Pertinent physical exam:  Mild tachypnea, hypoxic on 3L NC with sats high 70s to low 80s, awake and alert    Brief workup plan:  Labs, CXR, patient to be roomed in the main ED immediately    Preliminary workup initiated; this workup will be continued and followed by the physician or advanced practice provider that is assigned to the patient when roomed.   "

## 2024-04-23 PROBLEM — I50.32 CHRONIC DIASTOLIC HEART FAILURE: Status: RESOLVED | Noted: 2022-04-06 | Resolved: 2024-04-23

## 2024-04-23 LAB
ALLENS TEST: ABNORMAL
ALLENS TEST: NORMAL
ANION GAP SERPL CALC-SCNC: 12 MMOL/L (ref 8–16)
ANION GAP SERPL CALC-SCNC: 14 MMOL/L (ref 8–16)
BASOPHILS # BLD AUTO: 0.06 K/UL (ref 0–0.2)
BASOPHILS NFR BLD: 0.8 % (ref 0–1.9)
BUN SERPL-MCNC: 87 MG/DL (ref 6–20)
BUN SERPL-MCNC: 91 MG/DL (ref 6–20)
CALCIUM SERPL-MCNC: 10 MG/DL (ref 8.7–10.5)
CALCIUM SERPL-MCNC: 9.4 MG/DL (ref 8.7–10.5)
CHLORIDE SERPL-SCNC: 86 MMOL/L (ref 95–110)
CHLORIDE SERPL-SCNC: 87 MMOL/L (ref 95–110)
CO2 SERPL-SCNC: 37 MMOL/L (ref 23–29)
CO2 SERPL-SCNC: 39 MMOL/L (ref 23–29)
CREAT SERPL-MCNC: 2.2 MG/DL (ref 0.5–1.4)
CREAT SERPL-MCNC: 2.6 MG/DL (ref 0.5–1.4)
DIFFERENTIAL METHOD BLD: ABNORMAL
EOSINOPHIL # BLD AUTO: 0 K/UL (ref 0–0.5)
EOSINOPHIL NFR BLD: 0.3 % (ref 0–8)
ERYTHROCYTE [DISTWIDTH] IN BLOOD BY AUTOMATED COUNT: 20.7 % (ref 11.5–14.5)
EST. GFR  (NO RACE VARIABLE): 29.5 ML/MIN/1.73 M^2
EST. GFR  (NO RACE VARIABLE): 36 ML/MIN/1.73 M^2
GLUCOSE SERPL-MCNC: 102 MG/DL (ref 70–110)
GLUCOSE SERPL-MCNC: 99 MG/DL (ref 70–110)
HCO3 UR-SCNC: 47.7 MMOL/L (ref 24–28)
HCT VFR BLD AUTO: 53.8 % (ref 40–54)
HGB BLD-MCNC: 16 G/DL (ref 14–18)
IMM GRANULOCYTES # BLD AUTO: 0.02 K/UL (ref 0–0.04)
IMM GRANULOCYTES NFR BLD AUTO: 0.3 % (ref 0–0.5)
INR PPP: 2.4 (ref 0.8–1.2)
LDH SERPL L TO P-CCNC: 0.95 MMOL/L (ref 0.5–2.2)
LYMPHOCYTES # BLD AUTO: 1.7 K/UL (ref 1–4.8)
LYMPHOCYTES NFR BLD: 21.3 % (ref 18–48)
MAGNESIUM SERPL-MCNC: 1.4 MG/DL (ref 1.6–2.6)
MCH RBC QN AUTO: 25.8 PG (ref 27–31)
MCHC RBC AUTO-ENTMCNC: 29.7 G/DL (ref 32–36)
MCV RBC AUTO: 87 FL (ref 82–98)
MONOCYTES # BLD AUTO: 0.8 K/UL (ref 0.3–1)
MONOCYTES NFR BLD: 9.4 % (ref 4–15)
NEUTROPHILS # BLD AUTO: 5.5 K/UL (ref 1.8–7.7)
NEUTROPHILS NFR BLD: 67.9 % (ref 38–73)
NRBC BLD-RTO: 0 /100 WBC
OHS QRS DURATION: 106 MS
OHS QTC CALCULATION: 414 MS
PCO2 BLDA: 82.5 MMHG (ref 35–45)
PH SMN: 7.37 [PH] (ref 7.35–7.45)
PHOSPHATE SERPL-MCNC: 3.5 MG/DL (ref 2.7–4.5)
PLATELET # BLD AUTO: 193 K/UL (ref 150–450)
PMV BLD AUTO: 10.8 FL (ref 9.2–12.9)
PO2 BLDA: 24 MMHG (ref 40–60)
POC BE: 22 MMOL/L
POC SATURATED O2: 37 % (ref 95–100)
POC TCO2: >50 MMOL/L (ref 24–29)
POTASSIUM SERPL-SCNC: 3 MMOL/L (ref 3.5–5.1)
POTASSIUM SERPL-SCNC: 3.2 MMOL/L (ref 3.5–5.1)
PROTHROMBIN TIME: 24.9 SEC (ref 9–12.5)
RBC # BLD AUTO: 6.2 M/UL (ref 4.6–6.2)
SAMPLE: ABNORMAL
SAMPLE: NORMAL
SARS-COV-2 RDRP RESP QL NAA+PROBE: NEGATIVE
SITE: ABNORMAL
SITE: NORMAL
SODIUM SERPL-SCNC: 137 MMOL/L (ref 136–145)
SODIUM SERPL-SCNC: 138 MMOL/L (ref 136–145)
WBC # BLD AUTO: 8 K/UL (ref 3.9–12.7)

## 2024-04-23 PROCEDURE — 25000003 PHARM REV CODE 250

## 2024-04-23 PROCEDURE — 99900035 HC TECH TIME PER 15 MIN (STAT)

## 2024-04-23 PROCEDURE — U0002 COVID-19 LAB TEST NON-CDC: HCPCS | Performed by: STUDENT IN AN ORGANIZED HEALTH CARE EDUCATION/TRAINING PROGRAM

## 2024-04-23 PROCEDURE — 5A09357 ASSISTANCE WITH RESPIRATORY VENTILATION, LESS THAN 24 CONSECUTIVE HOURS, CONTINUOUS POSITIVE AIRWAY PRESSURE: ICD-10-PCS | Performed by: FAMILY MEDICINE

## 2024-04-23 PROCEDURE — 84100 ASSAY OF PHOSPHORUS: CPT

## 2024-04-23 PROCEDURE — 80048 BASIC METABOLIC PNL TOTAL CA: CPT | Mod: 91

## 2024-04-23 PROCEDURE — 85610 PROTHROMBIN TIME: CPT | Performed by: STUDENT IN AN ORGANIZED HEALTH CARE EDUCATION/TRAINING PROGRAM

## 2024-04-23 PROCEDURE — 36415 COLL VENOUS BLD VENIPUNCTURE: CPT

## 2024-04-23 PROCEDURE — 63600175 PHARM REV CODE 636 W HCPCS

## 2024-04-23 PROCEDURE — 20600001 HC STEP DOWN PRIVATE ROOM

## 2024-04-23 PROCEDURE — 80048 BASIC METABOLIC PNL TOTAL CA: CPT

## 2024-04-23 PROCEDURE — 94660 CPAP INITIATION&MGMT: CPT

## 2024-04-23 PROCEDURE — 85025 COMPLETE CBC W/AUTO DIFF WBC: CPT

## 2024-04-23 PROCEDURE — 82803 BLOOD GASES ANY COMBINATION: CPT

## 2024-04-23 PROCEDURE — 83735 ASSAY OF MAGNESIUM: CPT

## 2024-04-23 PROCEDURE — 83605 ASSAY OF LACTIC ACID: CPT

## 2024-04-23 PROCEDURE — 27100171 HC OXYGEN HIGH FLOW UP TO 24 HOURS

## 2024-04-23 PROCEDURE — 27000190 HC CPAP FULL FACE MASK W/VALVE

## 2024-04-23 PROCEDURE — 94761 N-INVAS EAR/PLS OXIMETRY MLT: CPT | Mod: XB

## 2024-04-23 RX ORDER — POTASSIUM CHLORIDE 20 MEQ/1
40 TABLET, EXTENDED RELEASE ORAL
Status: COMPLETED | OUTPATIENT
Start: 2024-04-23 | End: 2024-04-24

## 2024-04-23 RX ORDER — POTASSIUM CHLORIDE 20 MEQ/1
40 TABLET, EXTENDED RELEASE ORAL ONCE
Status: COMPLETED | OUTPATIENT
Start: 2024-04-23 | End: 2024-04-23

## 2024-04-23 RX ADMIN — POTASSIUM CHLORIDE 40 MEQ: 1500 TABLET, EXTENDED RELEASE ORAL at 10:04

## 2024-04-23 RX ADMIN — PANTOPRAZOLE SODIUM 40 MG: 40 TABLET, DELAYED RELEASE ORAL at 09:04

## 2024-04-23 RX ADMIN — THERA TABS 1 TABLET: TAB at 09:04

## 2024-04-23 RX ADMIN — ALLOPURINOL 300 MG: 100 TABLET ORAL at 09:04

## 2024-04-23 RX ADMIN — POTASSIUM CHLORIDE 40 MEQ: 1500 TABLET, EXTENDED RELEASE ORAL at 08:04

## 2024-04-23 RX ADMIN — FUROSEMIDE 80 MG: 10 INJECTION, SOLUTION INTRAVENOUS at 09:04

## 2024-04-23 RX ADMIN — POTASSIUM CHLORIDE 40 MEQ: 1500 TABLET, EXTENDED RELEASE ORAL at 06:04

## 2024-04-23 RX ADMIN — FUROSEMIDE 80 MG: 10 INJECTION, SOLUTION INTRAVENOUS at 12:04

## 2024-04-23 RX ADMIN — WARFARIN SODIUM 5 MG: 5 TABLET ORAL at 06:04

## 2024-04-23 RX ADMIN — FUROSEMIDE 80 MG: 10 INJECTION, SOLUTION INTRAVENOUS at 06:04

## 2024-04-23 RX ADMIN — POTASSIUM CHLORIDE 40 MEQ: 1500 TABLET, EXTENDED RELEASE ORAL at 03:04

## 2024-04-23 NOTE — PHARMACY MED REC
"  Admission Medication History     The home medication history was taken by Sary Sheldon.    You may go to "Admission" then "Reconcile Home Medications" tabs to review and/or act upon these items.     The home medication list has been updated by the Pharmacy department.   Please read ALL comments highlighted in yellow.   Please address this information as you see fit.    Feel free to contact us if you have any questions or require assistance.      The medications listed below were removed from the home medication list. Please reorder if appropriate:  Patient reports no longer taking the following medication(s):  Omega-3 fatty acids/fish oil (FISH OIL-OMEGA-3 FATTY ACIDS) 300-1,000 mg capsule  Polyethylene glycol 400 (VISINE DRY EYE RELIEF) 1 % Drop  Sildenafil (REVATIO) 20 mg Tab    Medications listed below were obtained from: Patient/family and Analytic software- [employer]  Current Outpatient Medications on File Prior to Encounter   Medication Sig    acetaminophen (TYLENOL ARTHRITIS ORAL) Take 2 tablets by mouth daily as needed (pain).    albuterol (VENTOLIN HFA) 90 mcg/actuation inhaler Inhale 2 puffs into the lungs every 4 (four) hours as needed for Wheezing or Shortness of Breath. Rescue)      allopurinoL (ZYLOPRIM) 300 MG tablet   Take 1 tablet (300 mg total) by mouth once daily.    ascorbic acid (VITAMIN C ORAL)   Take 1 tablet by mouth once daily.    b complex vitamins tablet Take 1 tablet by mouth once daily.      CALCIUM ORAL Take 1 capsule by mouth once daily.      metOLazone (ZAROXOLYN) 2.5 MG tablet   Take 1 tablet (2.5 mg total) by mouth every Mon, Wed, Fri, Sun.     multivit,calc,min/FA/K1/lycop (ONE-A-DAY MEN'S COMPLETE ORAL)   Take 1 tablet by mouth once daily.    pantoprazole (PROTONIX) 40 MG tablet   Take 40 mg by mouth every morning.)    potassium chloride (MICRO-K) 10 MEQ CpSR Take 2 capsules by mouth twice daily.      tiotropium (SPIRIVA) 18 mcg inhalation capsule   Inhale 18 mcg into the lungs " once daily.    torsemide (DEMADEX) 20 MG Tab Take 3 tablets (60 mg total) by mouth 2 (two) times a day.      warfarin (COUMADIN) 10 MG tablet   Take 1/2 Tablet 5 mg by mouth daily Or as directed by the coumadin clinic.)    WIXELA INHUB 250-50 mcg/dose diskus inhaler   1 puff 2 (two) times daily. USE 1 INHALATION BY MOUTH TWICE DAILY    SPIKEVAX [id number],12Y UP,,PF, 50 mcg/0.5 mL injection   Inject 0.5 mLs into the muscle.     Sary Burfect  [id number]               .

## 2024-04-23 NOTE — ASSESSMENT & PLAN NOTE
Patient with Hypercapnic and Hypoxic Respiratory failure which is Chronic.  he is on home oxygen at 3 LPM. Supplemental oxygen was provided and noted-      Signs/symptoms of respiratory failure include- tachypnea, increased work of breathing, use of accessory muscles, and lethargy. Contributing diagnoses includes -  Pulmonary HTN and HFpEF  Labs and images were reviewed. Patient Has not had a recent ABG. Will treat underlying causes and adjust management of respiratory failure as follows- Diuresis as per HFpEF, continue supplemental O2, continue home Wixela, Spiriva, and albuterol; and BiPAP qhs.

## 2024-04-23 NOTE — ASSESSMENT & PLAN NOTE
Creatine stable for now. BMP reviewed- noted Estimated Creatinine Clearance: 45.4 mL/min (A) (based on SCr of 2.2 mg/dL (H)). according to latest data. Based on current GFR, CKD stage is stage 3B - GFR 30-45.  Monitor UOP and serial BMP and adjust therapy as needed. Renally dose meds. Avoid nephrotoxic medications and procedures.

## 2024-04-23 NOTE — ASSESSMENT & PLAN NOTE
Chronic, controlled. Latest blood pressure and vitals reviewed-     Temp:  [98.2 °F (36.8 °C)-98.7 °F (37.1 °C)]   Pulse:  [104-115]   Resp:  [16-28]   BP: ()/(57-69)   SpO2:  [80 %-96 %] .   Home meds for hypertension were reviewed and noted below.   Hypertension Medications               metOLazone (ZAROXOLYN) 2.5 MG tablet Take 1 tablet (2.5 mg total) by mouth every Mon, Wed, Fri, Sun. Take 2.5 mg 4 times a week  and as needed in addition to that    torsemide (DEMADEX) 20 MG Tab Take 3 tablets (60 mg total) by mouth 2 (two) times a day.            While in the hospital, will manage blood pressure as follows; Adjust home antihypertensive regimen as follows- IV Lasix for the next few days    Will utilize p.r.n. blood pressure medication only if patient's blood pressure greater than 180/110 and he develops symptoms such as worsening chest pain or shortness of breath.

## 2024-04-23 NOTE — ED NOTES
Pt states BIPAP does not feel like it does at home and would rather sleep with the high flow nasal cannula. Pt removed mask and put on 7L high flow NC.

## 2024-04-23 NOTE — H&P
[facility] - Emergency Dept  [facility] Medicine  History & Physical    Patient Name: Yong Mcintyre  MRN: [id number]  Patient Class: IP- Inpatient  Admission Date: 4/22/2024  Attending Physician: Berenice Lara MD   Primary Care Provider: Gianni Escalona MD         Patient information was obtained from patient, past medical records, and ER records.     Subjective:     Principal Problem:Acute on chronic heart failure with preserved ejection fraction (HFpEF)    Chief Complaint:   Chief Complaint   Patient presents with    Shortness of Breath     Bp was low at home , on home oxygen 3l         HPI: Yong Mcintyre is a 48 y.o. male with Pulmonary HTN (Group 2-3, on 3L home O2, diagnosed prior to 2015, follows with Dr. Child at [facility]), MALLIKA, Obesity hypoventilation syndrome, HFpEF, Paroxysmal AFib (on Coumadin), BMI > 35, HTN, and GERD who presented to [facility] ED on 4/22/24 for evaluation of low blood pressure on his home cuff, fatigue, and chest discomfort.  He says he was in his usual state of health that morning, and even able to work out at the gym.  Then that afternoon he started feeling excessively tired and some pressure throughout his thorax (chest, sides, back) so he took his blood pressure and got 100/60.  He can not identify any precipitating factor, but is concerned that symptoms are the product of over-diuresis so he came in to the ED.  He reports having to stop 3 times to catch his breath on the walk in from the ED overflow parking lot.  He denies chest pain or tightness, and says he feels similar to how he did last time he was in the hospital a month ago.  He reports adherence to a low-salt diet and his home therapeutic regimen, which includes torsemide 20 b.i.d., metolazone 2.5 QIW (newly prescribed during his last admission in March), BiPAP qhs, Spiriva, and Wixela.  He denies recent illness or known sick contacts, but admits he sometimes finds it hard to limit fluid intake.      ED course  notable for tachycardia, tachypnea, and SpO2 80 on arrival (having just walked from his car as above) that settled to  BP 100s/50s RR teens SpO2 88-92; afebrile.  WBC WNL HCT 57.5 INR 2.4.  Na 135 K 3.2 Bicarb 35 BUN 89 Crt 2.2 (bl 2.1) eGFR 36.  Trop peaked at 0.018 .  Lactate 1.1.  EKG c/w cardiomegaly but no obvious ST changes.  CXR: Cardiomegaly, prominent pulmonary vasculature, and diffuse opacity c/w pulmonary edema.  He was admitted to Hospital Medicine for further evaluation and treatment of suspected acute on chronic right heart failure in the setting of longstanding pulmonary hypertension.  Of note, this will be his 4th admission to [facility] for similar symptoms within the last 6 months: most recently in March.  He last saw Dr. Child in February after his January [facility] admission, and metolazone was added at that time as scheduled 4 times a week and p.r.n. for any weight gain.  His next appointment is scheduled for June.    Past Medical History:   Diagnosis Date    Arthritis     CHF (congestive heart failure)     Diastolic dysfunction    Cor pulmonale 11/05/2012    Gallstones     Hypertension     Morbid obesity 11/05/2012    Obesity hypoventilation syndrome     On home oxygen therapy 03/07/2014    MALLIKA (obstructive sleep apnea)     BPAP 16/11 with 3 L at night (continuous O2).    Paroxysmal atrial fibrillation     PFO (patent foramen ovale) 11/05/2012    status post closure    Pickwickian syndrome 11/05/2012    Pulmonary hypertension        Past Surgical History:   Procedure Laterality Date    RIGHT HEART CATHETERIZATION Right 3/22/2022    Procedure: INSERTION, CATHETER, RIGHT HEART;  Surgeon: Tony Martínez MD;  Location: [facility] CATH LAB;  Service: Cardiology;  Laterality: Right;    RIGHT HEART CATHETERIZATION Right 1/31/2024    Procedure: INSERTION, CATHETER, RIGHT HEART;  Surgeon: Sheri Ritter MD;  Location: [facility] CATH LAB;  Service: Cardiology;  Laterality: Right;       Review of patient's  "allergies indicates:   Allergen Reactions    Lipitor [atorvastatin] Other (See Comments)     "it makes my legs feel like jelly"  Other reaction(s): causes legs to hurt       Current Facility-Administered Medications   Medication Dose Route Frequency Provider Last Rate Last Admin    acetaminophen tablet 1,000 mg  1,000 mg Oral Q8H PRN Nicholas Castañeda MD   1,000 mg at 04/22/24 2151    aluminum-magnesium hydroxide-simethicone 200-200-20 mg/5 mL suspension 30 mL  30 mL Oral QID PRN Nicholas Castañeda MD        dextrose 10% bolus 125 mL 125 mL  12.5 g Intravenous PRN Berenice Lara MD        dextrose 10% bolus 250 mL 250 mL  25 g Intravenous PRN Berenice Lara MD        enoxaparin injection 40 mg  40 mg Subcutaneous Daily Nicholas Castañeda MD   40 mg at 04/22/24 2151    glucagon (human recombinant) injection 1 mg  1 mg Intramuscular PRN Nicholas Castañeda MD        glucose chewable tablet 16 g  16 g Oral PRN Nicholas Castañeda MD        glucose chewable tablet 24 g  24 g Oral PRN Nicholas Castañeda MD        melatonin tablet 6 mg  6 mg Oral Nightly PRN Nicholas Castañeda MD        naloxone 0.4 mg/mL injection 0.02 mg  0.02 mg Intravenous PRNicholas Avalos MD        sodium chloride 0.9% flush 10 mL  10 mL Intravenous Q12H PRN Nicholas Castañeda MD         Current Outpatient Medications   Medication Sig Dispense Refill    acetaminophen (TYLENOL ARTHRITIS ORAL) Take 2 tablets by mouth daily as needed (pain).      albuterol (VENTOLIN HFA) 90 mcg/actuation inhaler Inhale 2 puffs into the lungs every 4 (four) hours as needed for Wheezing. Rescue 18 g 2    allopurinoL (ZYLOPRIM) 300 MG tablet Take 1 tablet (300 mg total) by mouth once daily. 90 tablet 3    ascorbic acid (VITAMIN C ORAL) Take 1 tablet by mouth once daily.      b complex vitamins tablet Take 1 tablet by mouth once daily.      CALCIUM ORAL Take 1 capsule by mouth once daily.      metOLazone (ZAROXOLYN) 2.5 MG tablet Take 1 tablet (2.5 mg total) by " mouth every Mon, Wed, Fri, Sun. Take 2.5 mg 4 times a week  and as needed in addition to that 16 tablet 11    multivit,calc,min/FA/K1/lycop (ONE-A-DAY MEN'S COMPLETE ORAL) Take 1 tablet by mouth once daily.      omega-3 fatty acids/fish oil (FISH OIL-OMEGA-3 FATTY ACIDS) 300-1,000 mg capsule Take 1 capsule by mouth once daily.      pantoprazole (PROTONIX) 40 MG tablet Take 1 tablet (40 mg total) by mouth once daily. (Patient taking differently: Take 40 mg by mouth every morning.) 90 tablet 3    polyethylene glycol 400 (VISINE DRY EYE RELIEF) 1 % Drop Place 2 drops into both eyes daily as needed (dry eyes).      potassium chloride (MICRO-K) 10 MEQ CpSR Take 10 mEq by mouth 2 (two) times daily.      SPIKEVAX 3229-6272,12Y UP,,PF, 50 mcg/0.5 mL injection Inject 0.5 mLs into the muscle.      tiotropium (SPIRIVA) 18 mcg inhalation capsule Inhale 18 mcg into the lungs once daily.      torsemide (DEMADEX) 20 MG Tab Take 3 tablets (60 mg total) by mouth 2 (two) times a day. 270 tablet 3    warfarin (COUMADIN) 10 MG tablet Take 1/2 tablet (5 mg) by mouth daily or as directed by [facility] (Patient taking differently: Take 5 mg by mouth Daily. Or as directed by the coumadin clinic.) 30 tablet 3    WIXELA INHUB 250-50 mcg/dose diskus inhaler 1 puff 2 (two) times daily. USE 1 INHALATION BY MOUTH TWICE DAILY       Family History       Problem Relation (Age of Onset)    Arthritis Mother, Maternal Grandmother, Maternal Grandfather    Asthma Mother, Sister, Maternal Grandmother    Breast cancer Paternal Grandmother    Diabetes Maternal Grandmother    Down syndrome Brother    Gout Brother    Hypertension Father, Maternal Grandmother, Maternal Grandfather, Paternal Grandfather          Tobacco Use    Smoking status: Never    Smokeless tobacco: Never   Substance and Sexual Activity    Alcohol use: Yes     Comment: holidays  rare    Drug use: No    Sexual activity: Not Currently     Partners: Female     Review of Systems    Constitutional:  Positive for activity change and fatigue. Negative for appetite change, fever and unexpected weight change.   Respiratory:  Positive for chest tightness (throughout his chest, sides, and back) and shortness of breath. Negative for cough and wheezing.    Cardiovascular:  Negative for chest pain.   Gastrointestinal:  Positive for abdominal distention. Negative for abdominal pain.     Objective:     Vital Signs (Most Recent):  Temp: 98.2 °F (36.8 °C) (04/22/24 2132)  Pulse: 106 (04/22/24 2132)  Resp: (!) 23 (04/22/24 2153)  BP: (!) 101/57 (04/22/24 2153)  SpO2: (!) 92 % (04/22/24 2153) Vital Signs (24h Range):  Temp:  [98.2 °F (36.8 °C)-98.7 °F (37.1 °C)] 98.2 °F (36.8 °C)  Pulse:  [104-115] 106  Resp:  [16-28] 23  SpO2:  [80 %-96 %] 92 %  BP: ()/(57-69) 101/57     Weight: 103 kg (227 lb)  Body mass index is 37.77 kg/m².     Physical Exam  Constitutional:       General: He is not in acute distress.     Appearance: He is obese. He is ill-appearing (chronically). He is not toxic-appearing.   HENT:      Head: Atraumatic.      Mouth/Throat:      Mouth: Mucous membranes are dry.   Eyes:      Extraocular Movements: Extraocular movements intact.   Cardiovascular:      Rate and Rhythm: Regular rhythm. Tachycardia present.      Pulses: Normal pulses.      Heart sounds: Normal heart sounds.   Pulmonary:      Effort: Pulmonary effort is normal.      Breath sounds: Rales (throughout all fields) present.   Abdominal:      General: There is distension.      Tenderness: There is no abdominal tenderness. There is no guarding.   Musculoskeletal:         General: Swelling present.      Right lower leg: Edema present.      Left lower leg: Edema present.   Neurological:      General: No focal deficit present.      Mental Status: He is alert and oriented to person, place, and time.   Psychiatric:         Mood and Affect: Mood normal.         Behavior: Behavior normal.          Significant Labs: All pertinent labs  within the past 24 hours have been reviewed.    CBC:   Recent Labs   Lab 04/22/24  1322 04/22/24  1819   WBC 8.63 7.66   HGB 17.2 16.6   HCT 59.7* 57.5*    197     CMP:   Recent Labs   Lab 04/22/24  1322 04/22/24  1819    135*   K 3.7 3.2*   CL 84* 87*   CO2 40* 35*   * 105   BUN 91* 89*   CREATININE 2.3* 2.2*   CALCIUM 9.9 9.2   PROT  --  8.1   ALBUMIN 3.1* 2.9*   BILITOT  --  0.9   ALKPHOS  --  155*   AST  --  39   ALT  --  28   ANIONGAP 14 13     Cardiac Markers:   Recent Labs   Lab 04/22/24  1819   *     Coagulation:   Recent Labs   Lab 04/22/24  2101   INR 2.4*     Lactic Acid:   Recent Labs   Lab 04/22/24  1838   LACTATE 1.1     Troponin:   Recent Labs   Lab 04/22/24  1819 04/22/24  2101   TROPONINI 0.018 0.017       Significant Imaging: I have reviewed all pertinent imaging results/findings within the past 24 hours.  CXR: I have reviewed all pertinent results/findings within the past 24 hours and my personal findings are:  c/w overload as above  Assessment/Plan:     * Acute on chronic heart failure with preserved ejection fraction (HFpEF)  Patient's current symptoms include: Dyspnea on exertion, Fatigue, and Lower extremity edema  Symptoms are worsening since 4/22/24.    Patient's most recent Echo 3/17/24:        Left Ventricle: The left ventricle is normal in size. Septal flattening in diastole consistent with right ventricular volume overload. There is normal systolic function. Ejection fraction by visual approximation is 60%.    Right Ventricle: Severe right ventricular enlargement. Systolic function is severely reduced. See text for details.    Right Atrium: Right atrium is severely dilated.    Tricuspid Valve: There is moderate to severe regurgitation.    Pulmonary Artery: There is severe pulmonary hypertension. The estimated pulmonary artery systolic pressure is 88 mmHg.    IVC/SVC: Elevated venous pressure at 15 mmHg.    Patient's most recent cardiac biomarkers:   Recent Labs  "  Lab 04/22/24  1819   *     PLAN FOR INITIAL STABILIZATION PER 2022 AHA/ACC/HFSA CHF GUIDELINE:  IV diuresis with Lasix 80 IV and monitor response.  Goal diuresis net 1L/day loss since his CHF is R-sided.  Home diuretic regimen: Torsemide 60 BID and metolazone 2.5 QIW + PRN  Statin  Will not repeat TTE since most recent Echo was performed on date as per above.  Maintain on telemetry and daily EKGs   Cardiac diet, with max 2g/day NaCl and fluid restriction at 1500 cc/day.  Strict I/Os and daily weights.  Dry weight 231 lbs.  Weight on admission self-reported as "227 lbs".  Weigh daily using standing scale.  Obtain current risk stratification data: TSH, Lipids, HbA1c with optimization of risk factors is necessary  Check electrolytes daily, keep Mag >2 & K+ >4  SCDs, TEDs, Nursing communication to elevated LE. Ambulate as tolerated.  Referrals to Heart Failure and/or Transitional Care Clinics will not be placed at [facility], since it appears he already follows with [place] [facility]        Pulmonary hypertension  Evaluated by HTS during [place] [facility] [place]:  - likely WHO group 3, less likely group 2  - [facility] 01/31/2024 - Severe pulmonary hypertension in setting of normal to mildly elevated left sided filling pressure at rest.   - Patient known by our service with no specific treatments for pulmonary hypertension from HTS standpoint  - Is following by pulmonology, who he needs to continue managing disease  Last evaluated by his pulmonologist Dr. Child at [facility] in [place]: metolazone was added, but no other therapeutic measures besides weight loss/lifestyle were considered.  Next appointment with [facility] in June.    Treatment as per HFpEF above.    Would likely benefit from more aggressive home diuretic regimen given that this is his 4th admission in 6 months for overload; will address prior to discharge.  Will not place referral to [facility] at this time since he is " [place] [facility]      Chronic respiratory failure with hypoxia and hypercapnia  Patient with Hypercapnic and Hypoxic Respiratory failure which is Chronic.  he is on home oxygen at 3 LPM. Supplemental oxygen was provided and noted-      Signs/symptoms of respiratory failure include- tachypnea, increased work of breathing, use of accessory muscles, and lethargy. Contributing diagnoses includes -  Pulmonary HTN and HFpEF  Labs and images were reviewed. Patient Has not had a recent ABG. Will treat underlying causes and adjust management of respiratory failure as follows- Diuresis as per HFpEF, continue supplemental O2, continue home Wixela, Spiriva, and albuterol; and BiPAP qhs.    Paroxysmal atrial fibrillation  On Coumadin    Patient with Paroxysmal (<7 days) atrial fibrillation which is controlled currently with  no medication . Patient is currently in sinus rhythm. Anticoagulation indicated. Anticoagulation done with Coumadin 5mg daily; continue inpatient .  Will seek assistance with dosing from Pharmacy.    Hypertension  Chronic, controlled. Latest blood pressure and vitals reviewed-     Temp:  [98.2 °F (36.8 °C)-98.7 °F (37.1 °C)]   Pulse:  [104-115]   Resp:  [16-28]   BP: ()/(57-69)   SpO2:  [80 %-96 %] .   Home meds for hypertension were reviewed and noted below.   Hypertension Medications               metOLazone (ZAROXOLYN) 2.5 MG tablet Take 1 tablet (2.5 mg total) by mouth every Mon, Wed, Fri, Sun. Take 2.5 mg 4 times a week  and as needed in addition to that    torsemide (DEMADEX) 20 MG Tab Take 3 tablets (60 mg total) by mouth 2 (two) times a day.            While in the hospital, will manage blood pressure as follows; Adjust home antihypertensive regimen as follows- IV Lasix for the next few days    Will utilize p.r.n. blood pressure medication only if patient's blood pressure greater than 180/110 and he develops symptoms such as worsening chest pain or shortness of breath.    Stage 3b  chronic kidney disease  Creatine stable for now. BMP reviewed- noted Estimated Creatinine Clearance: 45.4 mL/min (A) (based on SCr of 2.2 mg/dL (H)). according to latest data. Based on current GFR, CKD stage is stage 3B - GFR 30-45.  Monitor UOP and serial BMP and adjust therapy as needed. Renally dose meds. Avoid nephrotoxic medications and procedures.    MALLIKA (obstructive sleep apnea)  BiPAP QHS 16/11       Class 2 obesity due to excess calories in adult  Body mass index is 37.77 kg/m². Morbid obesity complicates all aspects of disease management from diagnostic modalities to treatment. Weight loss encouraged and health benefits explained to patient.           VTE Risk Mitigation (From admission, onward)           Ordered     warfarin (COUMADIN) tablet 5 mg  Daily         04/22/24 2335     IP VTE HIGH RISK PATIENT  Once         04/22/24 2108     Place sequential compression device  Until discontinued         04/22/24 2108                                    Nicholas Castañeda MD  Department of Hospital Medicine  [facility] - Emergency Dept

## 2024-04-23 NOTE — ASSESSMENT & PLAN NOTE
Body mass index is 37.77 kg/m². Morbid obesity complicates all aspects of disease management from diagnostic modalities to treatment. Weight loss encouraged and health benefits explained to patient.

## 2024-04-23 NOTE — ED NOTES
"Assumed care for pt after recieving report from nightshift RN. Pt. resting in bed in NAD, RR e/u. Vital signs stable and within desired limits at this time of assessment. Pt. offered bathroom assistance and denies need at this time. Explanation of care/wait provided. Pt verbalizes no needs at this time. Bed in low, locked position with rails up and call bell in reach. Pt's white board updated with today's care team and plan.     Patient identifiers for Yong Mcintyre 48 y.o. male checked and correct.  Chief Complaint   Patient presents with    Shortness of Breath     Bp was low at home , on home oxygen 3l      Past Medical History:   Diagnosis Date    Arthritis     CHF (congestive heart failure)     Diastolic dysfunction    Cor pulmonale 11/05/2012    Gallstones     Hypertension     Morbid obesity 11/05/2012    Obesity hypoventilation syndrome     On home oxygen therapy 03/07/2014    MALLIKA (obstructive sleep apnea)     BPAP 16/11 with 3 L at night (continuous O2).    Paroxysmal atrial fibrillation     PFO (patent foramen ovale) 11/05/2012    status post closure    Pickwickian syndrome 11/05/2012    Pulmonary hypertension      Allergies reported:   Review of patient's allergies indicates:   Allergen Reactions    Lipitor [atorvastatin] Other (See Comments)     "it makes my legs feel like jelly"  Other reaction(s): causes legs to hurt         LOC: Patient is awake, alert, and aware of environment with an appropriate affect. Patient is oriented x 4 and speaking appropriately.  APPEARANCE: Patient resting comfortably and in no acute distress. Patient is clean and well groomed, patient's clothing is properly fastened.  HEENT: WDL  SKIN: The skin is warm and dry. Patient has normal skin turgor and moist mucus membranes.   MUSCULOSKELETAL: Patient is moving all extremities well, no obvious deformities noted. Pulses intact.   RESPIRATORY: Airway is open and patent. Respirations are spontaneous and mildly labored at baseline " with mildly increased effort and rate, also his baseline.Pt on 5L humidified O2   CARDIAC: Patient tachycardic, 98 on cardiac monitor. Generalized peripheral edema noted. Denies chest pain and SOB at at time of assessment.   ABDOMEN: No distention noted. Soft and non-tender upon palpation.  NEUROLOGICAL: pupils 3mm, PERRL. Facial expression is symmetrical. Hand grasps are equal bilaterally. Normal sensation in all extremities when touched with finger.

## 2024-04-23 NOTE — PLAN OF CARE
Pt arrived to [facility] by stretcher with transport. He was able to ambulate to bed by self with oxygen. He is on 7 liters high flow nasal cannula with continuous pulse ox. Pt has call bell in reach, non slip socks on, and bedrails up x2. I have notified IM5 that patient is in room 84193. Pt is on telemetry monitoring.

## 2024-04-23 NOTE — ASSESSMENT & PLAN NOTE
Evaluated by HTS during [place] [facility] [place]:  - likely WHO group 3, less likely group 2  - [facility] 01/31/2024 - Severe pulmonary hypertension in setting of normal to mildly elevated left sided filling pressure at rest.   - Patient known by our service with no specific treatments for pulmonary hypertension from HTS standpoint  - Is following by pulmonology, who he needs to continue managing disease  Last evaluated by his pulmonologist Dr. Child at [facility] in February: metolazone was added, but no other therapeutic measures besides weight loss/lifestyle were considered.  Next appointment with Danyell in June.    Treatment as per HFpEF above.    Would likely benefit from more aggressive home diuretic regimen given that this is his 4th admission in 6 months for overload; will address prior to discharge.  Will not place referral to [facility] at this time since he is long-established with [facility]

## 2024-04-23 NOTE — ED NOTES
Assumed care of pt.     Pt AAOx4, resting comfortably in bed, NAD, respirations E/UL, updated on POC, wheels locked and in low position, call bell with in reach, Comfort positioning and restroom needs were addressed. Necessary items were placed with in reach and was advised when a reassessment would take place.

## 2024-04-23 NOTE — HPI
Yong Mcintyre is a 48 y.o. male with Pulmonary HTN (Group 2-3, on 3L home O2, diagnosed prior to 2015, follows with Dr. Child at [facility]), MALLIKA, Obesity hypoventilation syndrome, HFpEF, Paroxysmal AFib (on Coumadin), BMI > 35, HTN, and GERD who presented to [facility] ED on 4/22/24 for evaluation of low blood pressure on his home cuff, fatigue, and chest discomfort.  He says he was in his usual state of health that morning, and even able to work out at the gym.  Then that afternoon he started feeling excessively tired and some pressure throughout his thorax (chest, sides, back) so he took his blood pressure and got 100/60.  He can not identify any precipitating factor, but is concerned that symptoms are the product of over-diuresis so he came in to the ED.  He reports having to stop 3 times to catch his breath on the walk in from the ED overflow parking lot.  He denies chest pain or tightness, and says he feels similar to how he did last time he was in the hospital a month ago.  He reports adherence to a low-salt diet and his home therapeutic regimen, which includes torsemide 20 b.i.d., metolazone 2.5 QIW (newly prescribed during his last admission in March), BiPAP qhs, Spiriva, and Wixela.  He denies recent illness or known sick contacts, but admits he sometimes finds it hard to limit fluid intake.      ED course notable for tachycardia, tachypnea, and SpO2 80 on arrival (having just walked from his car as above) that settled to  BP 100s/50s RR teens SpO2 88-92; afebrile.  WBC WNL HCT 57.5 INR 2.4.  Na 135 K 3.2 Bicarb 35 BUN 89 Crt 2.2 (bl 2.1) eGFR 36.  Trop peaked at 0.018 .  Lactate 1.1.  EKG c/w cardiomegaly but no obvious ST changes.  CXR: Cardiomegaly, prominent pulmonary vasculature, and diffuse opacity c/w pulmonary edema.  He was admitted to Hospital Medicine for further evaluation and treatment of suspected acute on chronic right heart failure in the setting of longstanding pulmonary  hypertension.  Of note, this will be his 4th admission to [facility] for similar symptoms within the last 6 months: most recently in March.  He last saw Dr. Child in February after his January [facility] admission, and metolazone was added at that time as scheduled 4 times a week and p.r.n. for any weight gain.  His next appointment is scheduled for June.

## 2024-04-23 NOTE — ASSESSMENT & PLAN NOTE
On Coumadin    Patient with Paroxysmal (<7 days) atrial fibrillation which is controlled currently with  no medication . Patient is currently in sinus rhythm. Anticoagulation indicated. Anticoagulation done with Coumadin 5mg daily; continue inpatient .  Will seek assistance with dosing from Pharmacy.

## 2024-04-23 NOTE — SUBJECTIVE & OBJECTIVE
"Past Medical History:   Diagnosis Date    Arthritis     CHF (congestive heart failure)     Diastolic dysfunction    Cor pulmonale 11/05/2012    Gallstones     Hypertension     Morbid obesity 11/05/2012    Obesity hypoventilation syndrome     On home oxygen therapy 03/07/2014    MALLIKA (obstructive sleep apnea)     BPAP 16/11 with 3 L at night (continuous O2).    Paroxysmal atrial fibrillation     PFO (patent foramen ovale) 11/05/2012    status post closure    Pickwickian syndrome 11/05/2012    Pulmonary hypertension        Past Surgical History:   Procedure Laterality Date    RIGHT HEART CATHETERIZATION Right 3/22/2022    Procedure: INSERTION, CATHETER, RIGHT HEART;  Surgeon: Tony Martínez MD;  Location: [facility] CATH LAB;  Service: Cardiology;  Laterality: Right;    RIGHT HEART CATHETERIZATION Right 1/31/2024    Procedure: INSERTION, CATHETER, RIGHT HEART;  Surgeon: Sheri Ritter MD;  Location: [facility] CATH LAB;  Service: Cardiology;  Laterality: Right;       Review of patient's allergies indicates:   Allergen Reactions    Lipitor [atorvastatin] Other (See Comments)     "it makes my legs feel like jelly"  Other reaction(s): causes legs to hurt       Current Facility-Administered Medications   Medication Dose Route Frequency Provider Last Rate Last Admin    acetaminophen tablet 1,000 mg  1,000 mg Oral Q8H PRN Nicholas Castañeda MD   1,000 mg at 04/22/24 2151    aluminum-magnesium hydroxide-simethicone 200-200-20 mg/5 mL suspension 30 mL  30 mL Oral QID PRN Nicholas Castañeda MD        dextrose 10% bolus 125 mL 125 mL  12.5 g Intravenous PRN Berenice Lara MD        dextrose 10% bolus 250 mL 250 mL  25 g Intravenous PRN Berenice Lara MD        enoxaparin injection 40 mg  40 mg Subcutaneous Daily Nicholas Castañeda MD   40 mg at 04/22/24 2151    glucagon (human recombinant) injection 1 mg  1 mg Intramuscular PRN Nicholas Castañeda MD        glucose chewable tablet 16 g  16 g Oral PRN Nicholas Castañeda MD        " glucose chewable tablet 24 g  24 g Oral PRN Nicholas Castañeda MD        melatonin tablet 6 mg  6 mg Oral Nightly PRN Nicholas Castañeda MD        naloxone 0.4 mg/mL injection 0.02 mg  0.02 mg Intravenous PRN Nicholas Castañeda MD        sodium chloride 0.9% flush 10 mL  10 mL Intravenous Q12H PRN Nicholas Castañeda MD         Current Outpatient Medications   Medication Sig Dispense Refill    acetaminophen (TYLENOL ARTHRITIS ORAL) Take 2 tablets by mouth daily as needed (pain).      albuterol (VENTOLIN HFA) 90 mcg/actuation inhaler Inhale 2 puffs into the lungs every 4 (four) hours as needed for Wheezing. Rescue 18 g 2    allopurinoL (ZYLOPRIM) 300 MG tablet Take 1 tablet (300 mg total) by mouth once daily. 90 tablet 3    ascorbic acid (VITAMIN C ORAL) Take 1 tablet by mouth once daily.      b complex vitamins tablet Take 1 tablet by mouth once daily.      CALCIUM ORAL Take 1 capsule by mouth once daily.      metOLazone (ZAROXOLYN) 2.5 MG tablet Take 1 tablet (2.5 mg total) by mouth every Mon, Wed, Fri, Sun. Take 2.5 mg 4 times a week  and as needed in addition to that 16 tablet 11    multivit,calc,min/FA/K1/lycop (ONE-A-DAY MEN'S COMPLETE ORAL) Take 1 tablet by mouth once daily.      omega-3 fatty acids/fish oil (FISH OIL-OMEGA-3 FATTY ACIDS) 300-1,000 mg capsule Take 1 capsule by mouth once daily.      pantoprazole (PROTONIX) 40 MG tablet Take 1 tablet (40 mg total) by mouth once daily. (Patient taking differently: Take 40 mg by mouth every morning.) 90 tablet 3    polyethylene glycol 400 (VISINE DRY EYE RELIEF) 1 % Drop Place 2 drops into both eyes daily as needed (dry eyes).      potassium chloride (MICRO-K) 10 MEQ CpSR Take 10 mEq by mouth 2 (two) times daily.      SPIKEVAX 3464-1029,12Y UP,,PF, 50 mcg/0.5 mL injection Inject 0.5 mLs into the muscle.      tiotropium (SPIRIVA) 18 mcg inhalation capsule Inhale 18 mcg into the lungs once daily.      torsemide (DEMADEX) 20 MG Tab Take 3 tablets (60 mg total) by mouth 2  (two) times a day. 270 tablet 3    warfarin (COUMADIN) 10 MG tablet Take 1/2 tablet (5 mg) by mouth daily or as directed by Coumadin Clinic (Patient taking differently: Take 5 mg by mouth Daily. Or as directed by the coumadin clinic.) 30 tablet 3    WIXELA INHUB 250-50 mcg/dose diskus inhaler 1 puff 2 (two) times daily. USE 1 INHALATION BY MOUTH TWICE DAILY       Family History       Problem Relation (Age of Onset)    Arthritis Mother, Maternal Grandmother, Maternal Grandfather    Asthma Mother, Sister, Maternal Grandmother    Breast cancer Paternal Grandmother    Diabetes Maternal Grandmother    Down syndrome Brother    Gout Brother    Hypertension Father, Maternal Grandmother, Maternal Grandfather, Paternal Grandfather          Tobacco Use    Smoking status: Never    Smokeless tobacco: Never   Substance and Sexual Activity    Alcohol use: Yes     Comment: holidays  rare    Drug use: No    Sexual activity: Not Currently     Partners: Female     Review of Systems   Constitutional:  Positive for activity change and fatigue. Negative for appetite change, fever and unexpected weight change.   Respiratory:  Positive for chest tightness (throughout his chest, sides, and back) and shortness of breath. Negative for cough and wheezing.    Cardiovascular:  Negative for chest pain.   Gastrointestinal:  Positive for abdominal distention. Negative for abdominal pain.     Objective:     Vital Signs (Most Recent):  Temp: 98.2 °F (36.8 °C) (04/22/24 2132)  Pulse: 106 (04/22/24 2132)  Resp: (!) 23 (04/22/24 2153)  BP: (!) 101/57 (04/22/24 2153)  SpO2: (!) 92 % (04/22/24 2153) Vital Signs (24h Range):  Temp:  [98.2 °F (36.8 °C)-98.7 °F (37.1 °C)] 98.2 °F (36.8 °C)  Pulse:  [104-115] 106  Resp:  [16-28] 23  SpO2:  [80 %-96 %] 92 %  BP: ()/(57-69) 101/57     Weight: 103 kg (227 lb)  Body mass index is 37.77 kg/m².     Physical Exam  Constitutional:       General: He is not in acute distress.     Appearance: He is obese. He is  ill-appearing (chronically). He is not toxic-appearing.   HENT:      Head: Atraumatic.      Mouth/Throat:      Mouth: Mucous membranes are dry.   Eyes:      Extraocular Movements: Extraocular movements intact.   Cardiovascular:      Rate and Rhythm: Regular rhythm. Tachycardia present.      Pulses: Normal pulses.      Heart sounds: Normal heart sounds.   Pulmonary:      Effort: Pulmonary effort is normal.      Breath sounds: Rales (throughout all fields) present.   Abdominal:      General: There is distension.      Tenderness: There is no abdominal tenderness. There is no guarding.   Musculoskeletal:         General: Swelling present.      Right lower leg: Edema present.      Left lower leg: Edema present.   Neurological:      General: No focal deficit present.      Mental Status: He is alert and oriented to person, place, and time.   Psychiatric:         Mood and Affect: Mood normal.         Behavior: Behavior normal.          Significant Labs: All pertinent labs within the past 24 hours have been reviewed.    CBC:   Recent Labs   Lab 04/22/24  1322 04/22/24  1819   WBC 8.63 7.66   HGB 17.2 16.6   HCT 59.7* 57.5*    197     CMP:   Recent Labs   Lab 04/22/24  1322 04/22/24  1819    135*   K 3.7 3.2*   CL 84* 87*   CO2 40* 35*   * 105   BUN 91* 89*   CREATININE 2.3* 2.2*   CALCIUM 9.9 9.2   PROT  --  8.1   ALBUMIN 3.1* 2.9*   BILITOT  --  0.9   ALKPHOS  --  155*   AST  --  39   ALT  --  28   ANIONGAP 14 13     Cardiac Markers:   Recent Labs   Lab 04/22/24  1819   *     Coagulation:   Recent Labs   Lab 04/22/24  2101   INR 2.4*     Lactic Acid:   Recent Labs   Lab 04/22/24  1838   LACTATE 1.1     Troponin:   Recent Labs   Lab 04/22/24  1819 04/22/24  2101   TROPONINI 0.018 0.017       Significant Imaging: I have reviewed all pertinent imaging results/findings within the past 24 hours.  CXR: I have reviewed all pertinent results/findings within the past 24 hours and my personal findings are:   c/w overload as above

## 2024-04-23 NOTE — ED NOTES
Nurses Note -- 4 Eyes      04/22/2024   8:00 PM      Skin assessed during: Daily Assessment      [x] No Altered Skin Integrity Present    []Prevention Measures Documented      [] Yes- Altered Skin Integrity Present or Discovered   [] LDA Added if Not in Epic (Describe Wound)   [] New Altered Skin Integrity was Present on Admit and Documented in LDA   [] Wound Image Taken    Wound Care Consulted? No    Attending Nurse:  ADRIEL Rm     Second RN/Staff Member:   ADRIEL Khan

## 2024-04-23 NOTE — ASSESSMENT & PLAN NOTE
"Patient's current symptoms include: Dyspnea on exertion, Fatigue, and Lower extremity edema  Symptoms are worsening since 4/22/24.    Patient's most recent Echo 3/17/24:        Left Ventricle: The left ventricle is normal in size. Septal flattening in diastole consistent with right ventricular volume overload. There is normal systolic function. Ejection fraction by visual approximation is 60%.    Right Ventricle: Severe right ventricular enlargement. Systolic function is severely reduced. See text for details.    Right Atrium: Right atrium is severely dilated.    Tricuspid Valve: There is moderate to severe regurgitation.    Pulmonary Artery: There is severe pulmonary hypertension. The estimated pulmonary artery systolic pressure is 88 mmHg.    IVC/SVC: Elevated venous pressure at 15 mmHg.    Patient's most recent cardiac biomarkers:   Recent Labs   Lab 04/22/24  1819   *     PLAN FOR INITIAL STABILIZATION PER 2022 AHA/ACC/HFSA CHF GUIDELINE:  IV diuresis with Lasix 80 IV and monitor response.  Goal diuresis net 1L/day loss since his CHF is R-sided.  Home diuretic regimen: Torsemide 60 BID and metolazone 2.5 QIW + PRN  Statin  Will not repeat TTE since most recent Echo was performed on date as per above.  Maintain on telemetry and daily EKGs   Cardiac diet, with max 2g/day NaCl and fluid restriction at 1500 cc/day.  Strict I/Os and daily weights.  Dry weight 231 lbs.  Weight on admission self-reported as "227 lbs".  Weigh daily using standing scale.  Obtain current risk stratification data: TSH, Lipids, HbA1c with optimization of risk factors is necessary  Check electrolytes daily, keep Mag >2 & K+ >4  SCDs, TEDs, Nursing communication to elevated LE. Ambulate as tolerated.  Referrals to Heart Failure and/or Transitional Care Clinics will not be placed at [facility], since it appears he already follows with a similar program at [facility]      "

## 2024-04-23 NOTE — NURSING
Nurses Note -- 4 Eyes      4/23/2024   5:34 PM      Skin assessed during: Admit      [x] No Altered Skin Integrity Present    []Prevention Measures Documented      [] Yes- Altered Skin Integrity Present or Discovered   [] LDA Added if Not in Epic (Describe Wound)   [] New Altered Skin Integrity was Present on Admit and Documented in LDA   [] Wound Image Taken    Wound Care Consulted? No    Attending Nurse:  Lewis Natarajan RN/Staff Member:  yue

## 2024-04-24 LAB
ANION GAP SERPL CALC-SCNC: 11 MMOL/L (ref 8–16)
ANION GAP SERPL CALC-SCNC: 12 MMOL/L (ref 8–16)
ANION GAP SERPL CALC-SCNC: 12 MMOL/L (ref 8–16)
BASOPHILS # BLD AUTO: 0.04 K/UL (ref 0–0.2)
BASOPHILS NFR BLD: 0.5 % (ref 0–1.9)
BUN SERPL-MCNC: 76 MG/DL (ref 6–20)
BUN SERPL-MCNC: 83 MG/DL (ref 6–20)
BUN SERPL-MCNC: 84 MG/DL (ref 6–20)
CALCIUM SERPL-MCNC: 10.1 MG/DL (ref 8.7–10.5)
CALCIUM SERPL-MCNC: 9.5 MG/DL (ref 8.7–10.5)
CALCIUM SERPL-MCNC: 9.8 MG/DL (ref 8.7–10.5)
CHLORIDE SERPL-SCNC: 86 MMOL/L (ref 95–110)
CHLORIDE SERPL-SCNC: 89 MMOL/L (ref 95–110)
CHLORIDE SERPL-SCNC: 90 MMOL/L (ref 95–110)
CO2 SERPL-SCNC: 36 MMOL/L (ref 23–29)
CO2 SERPL-SCNC: 37 MMOL/L (ref 23–29)
CO2 SERPL-SCNC: 42 MMOL/L (ref 23–29)
CREAT SERPL-MCNC: 2.1 MG/DL (ref 0.5–1.4)
DIFFERENTIAL METHOD BLD: ABNORMAL
EOSINOPHIL # BLD AUTO: 0 K/UL (ref 0–0.5)
EOSINOPHIL NFR BLD: 0.4 % (ref 0–8)
ERYTHROCYTE [DISTWIDTH] IN BLOOD BY AUTOMATED COUNT: 19.9 % (ref 11.5–14.5)
EST. GFR  (NO RACE VARIABLE): 38.1 ML/MIN/1.73 M^2
GLUCOSE SERPL-MCNC: 104 MG/DL (ref 70–110)
GLUCOSE SERPL-MCNC: 105 MG/DL (ref 70–110)
GLUCOSE SERPL-MCNC: 96 MG/DL (ref 70–110)
HCT VFR BLD AUTO: 55.3 % (ref 40–54)
HGB BLD-MCNC: 16.9 G/DL (ref 14–18)
IMM GRANULOCYTES # BLD AUTO: 0.02 K/UL (ref 0–0.04)
IMM GRANULOCYTES NFR BLD AUTO: 0.3 % (ref 0–0.5)
INR PPP: 2 (ref 0.8–1.2)
LYMPHOCYTES # BLD AUTO: 1 K/UL (ref 1–4.8)
LYMPHOCYTES NFR BLD: 13.4 % (ref 18–48)
MAGNESIUM SERPL-MCNC: 1.5 MG/DL (ref 1.6–2.6)
MCH RBC QN AUTO: 27 PG (ref 27–31)
MCHC RBC AUTO-ENTMCNC: 30.6 G/DL (ref 32–36)
MCV RBC AUTO: 88 FL (ref 82–98)
MONOCYTES # BLD AUTO: 0.6 K/UL (ref 0.3–1)
MONOCYTES NFR BLD: 7.6 % (ref 4–15)
NEUTROPHILS # BLD AUTO: 5.8 K/UL (ref 1.8–7.7)
NEUTROPHILS NFR BLD: 77.8 % (ref 38–73)
NRBC BLD-RTO: 0 /100 WBC
PHOSPHATE SERPL-MCNC: 4.4 MG/DL (ref 2.7–4.5)
PLATELET # BLD AUTO: 208 K/UL (ref 150–450)
PMV BLD AUTO: 10.7 FL (ref 9.2–12.9)
POTASSIUM SERPL-SCNC: 3.3 MMOL/L (ref 3.5–5.1)
POTASSIUM SERPL-SCNC: 3.4 MMOL/L (ref 3.5–5.1)
POTASSIUM SERPL-SCNC: 3.6 MMOL/L (ref 3.5–5.1)
PROTHROMBIN TIME: 20.7 SEC (ref 9–12.5)
RBC # BLD AUTO: 6.27 M/UL (ref 4.6–6.2)
SODIUM SERPL-SCNC: 138 MMOL/L (ref 136–145)
SODIUM SERPL-SCNC: 138 MMOL/L (ref 136–145)
SODIUM SERPL-SCNC: 139 MMOL/L (ref 136–145)
WBC # BLD AUTO: 7.48 K/UL (ref 3.9–12.7)

## 2024-04-24 PROCEDURE — 83735 ASSAY OF MAGNESIUM: CPT

## 2024-04-24 PROCEDURE — 94799 UNLISTED PULMONARY SVC/PX: CPT

## 2024-04-24 PROCEDURE — 36415 COLL VENOUS BLD VENIPUNCTURE: CPT | Performed by: STUDENT IN AN ORGANIZED HEALTH CARE EDUCATION/TRAINING PROGRAM

## 2024-04-24 PROCEDURE — 94761 N-INVAS EAR/PLS OXIMETRY MLT: CPT

## 2024-04-24 PROCEDURE — 36415 COLL VENOUS BLD VENIPUNCTURE: CPT | Mod: XB

## 2024-04-24 PROCEDURE — 25000242 PHARM REV CODE 250 ALT 637 W/ HCPCS

## 2024-04-24 PROCEDURE — 25000003 PHARM REV CODE 250

## 2024-04-24 PROCEDURE — 80048 BASIC METABOLIC PNL TOTAL CA: CPT

## 2024-04-24 PROCEDURE — 85025 COMPLETE CBC W/AUTO DIFF WBC: CPT

## 2024-04-24 PROCEDURE — 27100171 HC OXYGEN HIGH FLOW UP TO 24 HOURS

## 2024-04-24 PROCEDURE — 99900035 HC TECH TIME PER 15 MIN (STAT)

## 2024-04-24 PROCEDURE — 20600001 HC STEP DOWN PRIVATE ROOM

## 2024-04-24 PROCEDURE — 63600175 PHARM REV CODE 636 W HCPCS: Performed by: STUDENT IN AN ORGANIZED HEALTH CARE EDUCATION/TRAINING PROGRAM

## 2024-04-24 PROCEDURE — 94660 CPAP INITIATION&MGMT: CPT

## 2024-04-24 PROCEDURE — 84100 ASSAY OF PHOSPHORUS: CPT

## 2024-04-24 PROCEDURE — 85610 PROTHROMBIN TIME: CPT | Performed by: STUDENT IN AN ORGANIZED HEALTH CARE EDUCATION/TRAINING PROGRAM

## 2024-04-24 RX ORDER — POTASSIUM CHLORIDE 20 MEQ/1
40 TABLET, EXTENDED RELEASE ORAL DAILY
Status: DISCONTINUED | OUTPATIENT
Start: 2024-04-24 | End: 2024-04-27 | Stop reason: HOSPADM

## 2024-04-24 RX ORDER — FUROSEMIDE 10 MG/ML
120 INJECTION INTRAMUSCULAR; INTRAVENOUS 3 TIMES DAILY
Status: COMPLETED | OUTPATIENT
Start: 2024-04-24 | End: 2024-04-25

## 2024-04-24 RX ORDER — MAGNESIUM SULFATE HEPTAHYDRATE 40 MG/ML
2 INJECTION, SOLUTION INTRAVENOUS ONCE
Status: COMPLETED | OUTPATIENT
Start: 2024-04-24 | End: 2024-04-24

## 2024-04-24 RX ADMIN — FUROSEMIDE 120 MG: 10 INJECTION, SOLUTION INTRAVENOUS at 08:04

## 2024-04-24 RX ADMIN — Medication 1 CAPSULE: at 09:04

## 2024-04-24 RX ADMIN — WARFARIN SODIUM 5 MG: 5 TABLET ORAL at 05:04

## 2024-04-24 RX ADMIN — MAGNESIUM SULFATE HEPTAHYDRATE 2 G: 40 INJECTION, SOLUTION INTRAVENOUS at 09:04

## 2024-04-24 RX ADMIN — THERA TABS 1 TABLET: TAB at 09:04

## 2024-04-24 RX ADMIN — PANTOPRAZOLE SODIUM 40 MG: 40 TABLET, DELAYED RELEASE ORAL at 09:04

## 2024-04-24 RX ADMIN — TIOTROPIUM BROMIDE INHALATION SPRAY 2 PUFF: 3.12 SPRAY, METERED RESPIRATORY (INHALATION) at 09:04

## 2024-04-24 RX ADMIN — POTASSIUM CHLORIDE 40 MEQ: 1500 TABLET, EXTENDED RELEASE ORAL at 12:04

## 2024-04-24 RX ADMIN — FUROSEMIDE 120 MG: 10 INJECTION, SOLUTION INTRAVENOUS at 02:04

## 2024-04-24 RX ADMIN — ALLOPURINOL 300 MG: 100 TABLET ORAL at 09:04

## 2024-04-24 RX ADMIN — POTASSIUM CHLORIDE 40 MEQ: 1500 TABLET, EXTENDED RELEASE ORAL at 02:04

## 2024-04-24 RX ADMIN — FUROSEMIDE 120 MG: 10 INJECTION, SOLUTION INTRAVENOUS at 09:04

## 2024-04-24 NOTE — PLAN OF CARE
[facility] - Transplant Stepdown  Initial Discharge Assessment       Primary Care Provider: Gianni Escalona MD    Admission Diagnosis: Chest pain [R07.9]    Admission Date: 4/22/2024  Expected Discharge Date: 4/26/2024    Transition of Care Barriers: (P) None    Payor: HU[employer] MANAGED MEDICARE / Plan: HU[employer]NA MEDICARE PPO / Product Type: Medicare Advantage /     Extended Emergency Contact Information  Primary Emergency Contact: Alphonse Mcintyre  Address: [address] [place] [address] [place]  Mobile Phone: [phone]  Relation: Father  Secondary Emergency Contact: Bernardino Mcintyre  Address: [address]  Mobile Phone: [phone]  Relation: Mother    Discharge Plan A: (P) [id number] - [place], [place] - [address] [place] [address] AT [facility]  [address] [place] [address]  Phone: [phone] Fax: [phone]    [facility] [address] [facility]  [address] [facility] [address]  Phone: [phone] Fax: [phone]    [facility]  [address] [facility] [address]  Phone: [phone] Fax: [phone]    [facility]  [address]  [facility] [address]  Phone: [phone] Fax: [phone]      Initial Assessment (most recent)       Adult Discharge Assessment - 04/24/24 1114          Discharge Assessment    Assessment Type Discharge Planning Assessment (P)      Confirmed/corrected address, phone number and insurance Yes (P)      Confirmed Demographics Correct on Facesheet (P)      Source of Information patient;health record (P)      Communicated ESTEBAN with patient/caregiver Yes (P)      People in Home alone (P)      Do you expect to return to your current living situation? Yes (P)      Do you have help at home or someone to help you manage your care at home? No (P)       Prior to hospitilization cognitive status: Alert/Oriented (P)      Current cognitive status: Alert/Oriented (P)      Equipment Currently Used at Home oxygen;BIPAP (P)      Readmission within 30 days? No (P)      Patient currently being followed by outpatient case management? No (P)      Do you currently have service(s) that help you manage your care at home? No (P)      Do you take prescription medications? Yes (P)      Do you have prescription coverage? Yes (P)      Do you have any problems affording any of your prescribed medications? No (P)      Is the patient taking medications as prescribed? yes (P)      How do you get to doctors appointments? family or friend will provide;car, drives self (P)      Are you on dialysis? No (P)      Do you take coumadin? Yes (P)      Discharge Plan A Home Health (P)      DME Needed Upon Discharge  none (P)      Discharge Plan discussed with: Patient (P)      Transition of Care Barriers None (P)         Physical Activity    On average, how many days per week do you engage in moderate to strenuous exercise (like a brisk walk)? 0 days (P)      On average, how many minutes do you engage in exercise at this level? 0 min (P)         Financial Resource Strain    How hard is it for you to pay for the very basics like food, housing, medical care, and heating? Not hard at all (P)         Housing Stability    In the last 12 months, was there a time when you were not able to pay the mortgage or rent on time? No (P)      At any time in the past 12 months, were you homeless or living in a shelter (including now)? No (P)         Transportation Needs    In the past 12 months, has lack of transportation kept you from medical appointments or from getting medications? No (P)      In the past 12 months, has lack of transportation kept you from meetings, work, or from getting things needed for daily living? No (P)         Food Insecurity    Within the past 12 months, you worried that your food  would run out before you got the money to buy more. Never true (P)         Stress    Do you feel stress - tense, restless, nervous, or anxious, or unable to sleep at night because your mind is troubled all the time - these days? Only a little (P)         Social Connections    In a typical week, how many times do you talk on the phone with family, friends, or neighbors? Three times a week (P)      How often do you get together with friends or relatives? Three times a week (P)      How often do you attend Hinduism or Congregational services? Never (P)      Do you belong to any clubs or organizations such as Hinduism groups, unions, fraternal or athletic groups, or school groups? No (P)      How often do you attend meetings of the clubs or organizations you belong to? Never (P)      Are you , , , , never , or living with a partner? Never  (P)         Alcohol Use    Q1: How often do you have a drink containing alcohol? Never (P)      Q2: How many drinks containing alcohol do you have on a typical day when you are drinking? Patient does not drink (P)

## 2024-04-24 NOTE — PROGRESS NOTES
[facility] - Transplant [facility] Medicine  Progress Note    Patient Name: Yong Mcintyre  MRN: [id number]  Patient Class: IP- Inpatient   Admission Date: 4/22/2024  Length of Stay: 2 days  Attending Physician: Berenice Lara MD  Primary Care Provider: Gianni Escalona MD        Subjective:     Principal Problem:Acute on chronic heart failure with preserved ejection fraction (HFpEF)        HPI:  Yong Mcintyre is a 48 y.o. male with Pulmonary HTN (Group 2-3, on 3L home O2, diagnosed prior to 2015, follows with Dr. Child at [facility]), MALLIKA, Obesity hypoventilation syndrome, HFpEF, Paroxysmal AFib (on Coumadin), BMI > 35, HTN, and GERD who presented to [facility] ED on 4/22/24 for evaluation of low blood pressure on his home cuff, fatigue, and chest discomfort.  He says he was in his usual state of health that morning, and even able to work out at the gym.  Then that afternoon he started feeling excessively tired and some pressure throughout his thorax (chest, sides, back) so he took his blood pressure and got 100/60.  He can not identify any precipitating factor, but is concerned that symptoms are the product of over-diuresis so he came in to the ED.  He reports having to stop 3 times to catch his breath on the walk in from the ED overflow parking lot.  He denies chest pain or tightness, and says he feels similar to how he did last time he was in the hospital a month ago.  He reports adherence to a low-salt diet and his home therapeutic regimen, which includes torsemide 20 b.i.d., metolazone 2.5 QIW (newly prescribed during his last admission in March), BiPAP qhs, Spiriva, and Wixela.  He denies recent illness or known sick contacts, but admits he sometimes finds it hard to limit fluid intake.      ED course notable for tachycardia, tachypnea, and SpO2 80 on arrival (having just walked from his car as above) that settled to  BP 100s/50s RR teens SpO2 88-92; afebrile.  WBC WNL HCT 57.5 INR 2.4.  Na  135 K 3.2 Bicarb 35 BUN 89 Crt 2.2 (bl 2.1) eGFR 36.  Trop peaked at 0.018 .  Lactate 1.1.  EKG c/w cardiomegaly but no obvious ST changes.  CXR: Cardiomegaly, prominent pulmonary vasculature, and diffuse opacity c/w pulmonary edema.  He was admitted to Hospital Medicine for further evaluation and treatment of suspected acute on chronic right heart failure in the setting of longstanding pulmonary hypertension.  Of note, this will be his 4th admission to [facility] for similar symptoms within the last 6 months: most recently in March.  He last saw Dr. Child in February after his January [facility] admission, and metolazone was added at that time as scheduled 4 times a week and p.r.n. for any weight gain.  His next appointment is scheduled for June.    Overview/Hospital Course:  48 y.o. male with Pulmonary HTN (Group 2-3, on 3L home O2, diagnosed prior to 2015, follows with Dr. Child at [facility]), MALLIKA, Obesity hypoventilation syndrome, HFpEF, Paroxysmal AFib (on Coumadin), BMI > 35, HTN, and GERD who presented to [facility] ED on 4/22/24 for evaluation of low blood pressure on his home cuff, fatigue, and chest discomfort. ED course notable for tachycardia, tachypnea, and SpO2 80 on arrival (having just walked from his car as above) that settled to  BP 100s/50s RR teens SpO2 88-92; afebrile.  WBC WNL HCT 57.5 INR 2.4.  Na 135 K 3.2 Bicarb 35 BUN 89 Crt 2.2 (bl 2.1) eGFR 36.  Trop peaked at 0.018 .  Lactate 1.1.  EKG c/w cardiomegaly but no obvious ST changes.  CXR: Cardiomegaly, prominent pulmonary vasculature, and diffuse opacity c/w pulmonary edema. He reports adherence to a low-salt diet and his home therapeutic regimen, which includes torsemide 20 b.i.d., metolazone 2.5 QIW (newly prescribed during his last admission in March), BiPAP qhs, Spiriva, and Wixela. His Lasix was titrated to 120 TID for adequate diuresis. Can consider adding Metolazone. He remains on 7 L NC.     Interval History: NAEO. He remains  on 7 L NC. Lasix increased to 120 TID for adequate diuresis.     Review of Systems  Objective:     Vital Signs (Most Recent):  Temp: 97.8 °F (36.6 °C) (04/24/24 1125)  Pulse: 98 (04/24/24 1125)  Resp: 18 (04/24/24 1125)  BP: 115/68 (04/24/24 1125)  SpO2: (!) 92 % (04/24/24 1125) Vital Signs (24h Range):  Temp:  [97.7 °F (36.5 °C)-98.5 °F (36.9 °C)] 97.8 °F (36.6 °C)  Pulse:  [] 98  Resp:  [15-20] 18  SpO2:  [90 %-97 %] 92 %  BP: (103-117)/(60-72) 115/68     Weight: 102.2 kg (225 lb 5 oz)  Body mass index is 37.49 kg/m².    Intake/Output Summary (Last 24 hours) at 4/24/2024 1531  Last data filed at 4/24/2024 1504  Gross per 24 hour   Intake 833.31 ml   Output 3175 ml   Net -2341.69 ml         Physical Exam  Constitutional:       General: He is not in acute distress.     Appearance: He is obese. He is ill-appearing.   HENT:      Head: Normocephalic and atraumatic.   Eyes:      Extraocular Movements: Extraocular movements intact.      Conjunctiva/sclera: Conjunctivae normal.   Cardiovascular:      Rate and Rhythm: Normal rate and regular rhythm.   Pulmonary:      Effort: No respiratory distress.      Comments: 7 L NC  Abdominal:      Palpations: Abdomen is soft.      Tenderness: There is no abdominal tenderness.   Musculoskeletal:      Right lower leg: Edema present.      Left lower leg: Edema present.   Neurological:      Mental Status: He is alert.         Significant Labs: All pertinent labs within the past 24 hours have been reviewed.    Significant Imaging: I have reviewed all pertinent imaging results/findings within the past 24 hours.     Assessment/Plan:      * Acute on chronic heart failure with preserved ejection fraction (HFpEF)  Patient's current symptoms include: Dyspnea on exertion, Fatigue, and Lower extremity edema  Symptoms are worsening since 4/22/24.    Patient's most recent Echo 3/17/24:        Left Ventricle: The left ventricle is normal in size. Septal flattening in diastole consistent with  "right ventricular volume overload. There is normal systolic function. Ejection fraction by visual approximation is 60%.    Right Ventricle: Severe right ventricular enlargement. Systolic function is severely reduced. See text for details.    Right Atrium: Right atrium is severely dilated.    Tricuspid Valve: There is moderate to severe regurgitation.    Pulmonary Artery: There is severe pulmonary hypertension. The estimated pulmonary artery systolic pressure is 88 mmHg.    IVC/SVC: Elevated venous pressure at 15 mmHg.    Patient's most recent cardiac biomarkers:   Recent Labs   Lab 04/22/24  1819   *       PLAN FOR INITIAL STABILIZATION PER 2022 AHA/ACC/HFSA CHF GUIDELINE:  IV diuresis with Lasix 120 IV TID and monitor response.   Home diuretic regimen: Torsemide 60 BID and metolazone 2.5 QIW + PRN  Statin  Will not repeat TTE since most recent Echo was performed on date as per above.  Maintain on telemetry and daily EKGs   Cardiac diet, with max 2g/day NaCl and fluid restriction at 1500 cc/day.  Strict I/Os and daily weights.  Dry weight 231 lbs.  Weight on admission self-reported as "227 lbs".  Weigh daily using standing scale.  Obtain current risk stratification data: TSH, Lipids, HbA1c with optimization of risk factors is necessary  Check electrolytes daily, keep Mag >2 & K+ >4  SCDs, TEDs, Nursing communication to elevated LE. Ambulate as tolerated.  Referrals to Heart Failure and/or Transitional Care Clinics since he does not have a cardiologist        Paroxysmal atrial fibrillation  On Coumadin    Patient with Paroxysmal (<7 days) atrial fibrillation which is controlled currently with  no medication . Patient is currently in sinus rhythm. Anticoagulation indicated. Anticoagulation done with Coumadin 5mg daily; continue inpatient .  Will seek assistance with dosing from Pharmacy.    Chronic respiratory failure with hypoxia and hypercapnia  Patient with Hypercapnic and Hypoxic Respiratory failure which " is Chronic.  he is on home oxygen at 3 LPM. Supplemental oxygen was provided and noted- Oxygen Concentration (%):  [7-50] 7    Signs/symptoms of respiratory failure include- tachypnea, increased work of breathing, use of accessory muscles, and lethargy. Contributing diagnoses includes -  Pulmonary HTN and HFpEF  Labs and images were reviewed. Patient Has not had a recent ABG. Will treat underlying causes and adjust management of respiratory failure as follows- Diuresis as per HFpEF, continue supplemental O2, continue home Wixela, Spiriva, and albuterol; and BiPAP qhs.    Stage 3b chronic kidney disease  Creatine stable for now. BMP reviewed- noted Estimated Creatinine Clearance: 45.4 mL/min (A) (based on SCr of 2.2 mg/dL (H)). according to latest data. Based on current GFR, CKD stage is stage 3B - GFR 30-45.  Monitor UOP and serial BMP and adjust therapy as needed. Renally dose meds. Avoid nephrotoxic medications and procedures.    Class 2 obesity due to excess calories in adult  Body mass index is 37.77 kg/m². Morbid obesity complicates all aspects of disease management from diagnostic modalities to treatment. Weight loss encouraged and health benefits explained to patient.         Hypertension  Chronic, controlled. Latest blood pressure and vitals reviewed-     Temp:  [98.2 °F (36.8 °C)-98.7 °F (37.1 °C)]   Pulse:  [104-115]   Resp:  [16-28]   BP: ()/(57-69)   SpO2:  [80 %-96 %] .   Home meds for hypertension were reviewed and noted below.   Hypertension Medications               metOLazone (ZAROXOLYN) 2.5 MG tablet Take 1 tablet (2.5 mg total) by mouth every Mon, Wed, Fri, Sun. Take 2.5 mg 4 times a week  and as needed in addition to that    torsemide (DEMADEX) 20 MG Tab Take 3 tablets (60 mg total) by mouth 2 (two) times a day.            While in the hospital, will manage blood pressure as follows; Adjust home antihypertensive regimen as follows- IV Lasix for the next few days    Will utilize p.r.n. blood  pressure medication only if patient's blood pressure greater than 180/110 and he develops symptoms such as worsening chest pain or shortness of breath.    MALLIKA (obstructive sleep apnea)  BiPAP Q 16/11       Pulmonary hypertension  Evaluated by HTS during his last admission to [facility] in March:  - likely WHO group 3, less likely group 2  - [facility] 01/31/2024 - Severe pulmonary hypertension in setting of normal to mildly elevated left sided filling pressure at rest.   - Patient known by our service with no specific treatments for pulmonary hypertension from HTS standpoint  - Is following by pulmonology, who he needs to continue managing disease  Last evaluated by his pulmonologist Dr. Child at [facility] in February: metolazone was added, but no other therapeutic measures besides weight loss/lifestyle were considered.  Next appointment with Danyell in June.    Treatment as per HFpEF above.    Would likely benefit from more aggressive home diuretic regimen given that this is his 4th admission in 6 months for overload; will address prior to discharge.  Will not place referral to [facility] at this time since he is long-established with [facility]        VTE Risk Mitigation (From admission, onward)           Ordered     warfarin (COUMADIN) tablet 5 mg  Daily         04/22/24 2335     IP VTE HIGH RISK PATIENT  Once         04/22/24 2108     Place sequential compression device  Until discontinued         04/22/24 2108                    Discharge Planning   ESTEBAN: 4/25/2024     Code Status: Full Code   Is the patient medically ready for discharge?:     Reason for patient still in hospital (select all that apply): Patient trending condition  Discharge Plan A: Home Health                  Lizy Atkinson DO  Department of Hospital Medicine   [facility] - Transplant Stepdown

## 2024-04-24 NOTE — PLAN OF CARE
Pt aaox3. Independent and ambulatory. VSS, Sats 90-93% while awake on 7l HiFlo NC-desats c minimal exertion. Sats stable on Bipap while asleep. Pt c FVo; ^BNP-cont c IVP Lasix for diuresing. Good uop o/n. CXR in AM. Potassium replaced PO. Pt updated on plan of care.

## 2024-04-24 NOTE — HOSPITAL COURSE
Patient admitted with low blood pressure on his home cuff, fatigue, and chest discomfort. ED course notable for tachycardia, tachypnea, and SpO2 80 on arrival (having just walked from his car as above) that settled to  BP 100s/50s SpO2 88-92 on 7L O2; afebrile.  WBC WNL HCT 57.5 INR 2.4.  Na 135 K 3.2 Bicarb 35 BUN 89 Crt 2.2 (bl 2.1) eGFR 36.  Trop peaked at 0.018 .  Lactate 1.1.  EKG c/w cardiomegaly but no obvious ST changes.  CXR: Cardiomegaly, prominent pulmonary vasculature, and diffuse opacity c/w pulmonary edema. He reports adherence to a low-salt diet and his home therapeutic regimen, which includes torsemide 20 b.i.d., metolazone 2.5 QIW (newly prescribed during his last admission in March), BiPAP qhs, Spiriva, and Wixela. He was treated with IV lasix, titrated to 120 TID with appropriate response. He was then transitioned to home oral regimen. At time of discharge, his fluid status was net negative 11.9L and his O2 requirement had decreased to 4 L/min to maintain SpO2 > 88%.    Stable to discharge to home with home health. He will continue torsemide 60mg BID as well as metolazone 2.5 mg M, W, F, Sun, as well as with sliding scale metolazone. Referral to heart failure clinic placed, he has also been recommended to follow up with cardiology and nephrology (missed his establish care appointment with nephrology while admitted).     Constitutional:       General: alert, no apparent distress; sitting upright in bed  HENT:      Head: Normocephalic and atraumatic.   Cardiovascular:      Rate and Rhythm: Normal rate and regular rhythm.      Heart sounds: Normal heart sounds.   Pulmonary:      Effort: Pulmonary effort is normal on 4L O2.      Breath sounds: mild bilateral crackles, greatly improved from prior  Musculoskeletal:      Cervical back: Normal range of motion.   Skin:     General: Skin is warm and dry   Neurological:      Mental Status: alert and oriented to person, place, and time.    Psychiatric:         Mood and Affect: Mood normal.         Behavior: Behavior normal.

## 2024-04-24 NOTE — PLAN OF CARE
He is on 7 liters high flow nasal cannula with continuous pulse ox. Pt has call bell in reach, non slip socks on, and bedrails up x2. Pt is on telemetry monitoring. Pt is on strict I&Os with iv push lasix. He has extension tubing for ambulating to the restroom. Pt encouraged to wash hands. He has received potassium replacement and magnesium replacement today.

## 2024-04-24 NOTE — ASSESSMENT & PLAN NOTE
Patient with Hypercapnic and Hypoxic Respiratory failure which is Chronic.  he is on home oxygen at 3 LPM. Supplemental oxygen was provided and noted- Oxygen Concentration (%):  [7-50] 7    Signs/symptoms of respiratory failure include- tachypnea, increased work of breathing, use of accessory muscles, and lethargy. Contributing diagnoses includes -  Pulmonary HTN and HFpEF  Labs and images were reviewed. Patient Has not had a recent ABG. Will treat underlying causes and adjust management of respiratory failure as follows- Diuresis as per HFpEF, continue supplemental O2, continue home Wixela, Spiriva, and albuterol; and BiPAP qhs.

## 2024-04-24 NOTE — RESPIRATORY THERAPY
RAPID RESPONSE RESPIRATORY THERAPY PROACTIVE ROUNDING NOTE             Time of visit: 1055     Code Status: Full Code   : 1975  Bed: 91043/98220 A:   MRN: [id number]  Time spent at the bedside: < 15 min    SITUATION    Evaluated patient for: HFNC Compliance     BACKGROUND    Patient has a past medical history of Arthritis, CHF (congestive heart failure), Cor pulmonale, Gallstones, Hypertension, Morbid obesity, Obesity hypoventilation syndrome, On home oxygen therapy, MALLIKA (obstructive sleep apnea), Paroxysmal atrial fibrillation, PFO (patent foramen ovale), Pickwickian syndrome, and Pulmonary hypertension.    24 Hours Vitals Range:  Temp:  [97.8 °F (36.6 °C)-98.5 °F (36.9 °C)]   Pulse:  []   Resp:  [15-20]   BP: (103-115)/(60-70)   SpO2:  [90 %-98 %]     Labs:    Recent Labs     24  1322 24  1819 24  0319 24  1800 24  0606 24  0832 24  1150      < > 138 137  --  138 138   K 3.7   < > 3.2* 3.0*  --  3.4* 3.3*   CL 84*   < > 87* 86*  --  90* 89*   CO2 40*   < > 37* 39*  --  36* 37*   BUN 91*   < > 87* 91*  --  84* 83*   CREATININE 2.3*   < > 2.2* 2.6*  --  2.1* 2.1*   *   < > 99 102  --  105 104   PHOS 2.9  --  3.5  --  4.4  --   --    MG  --   --  1.4*  --  1.5*  --   --     < > = values in this interval not displayed.        Recent Labs     24  0927   PH 7.370   PCO2 82.5*   PO2 24*   HCO3 47.7*   POCSATURATED 37   BE 22*       ASSESSMENT/INTERVENTIONS        Last VS   Temp: 98.2 °F (36.8 °C) (1542)  Pulse: 96 ( 1605)  Resp: 18 (1542)  BP: 107/60 (1542)  SpO2: 98 % (1542)    Level of Consciousness: Level of Consciousness (AVPU): alert  Respiratory Effort: Respiratory Effort: Normal, Unlabored Expansion/Accessory Muscle Usage: Expansion/Accessory Muscles/Retractions: no use of accessory muscles, no retractions, expansion symmetric  All Lung Field Breath Sounds: All Lung Fields Breath Sounds: Posterior:, coarse  O2  Device/Concentration: 7L  Was the O2 device able to be weaned? No  Ambu at bedside:      Active Orders   Respiratory Care    Bipap QHS     Frequency: QHS     Number of Occurrences: Until Specified     Order Questions:      Mode Bilevel      FiO2% 40      Inspiratory pressure: 16      Expiratory pressure: 11      PRN respiratory support needs (if QHS frequency selected): QHS for nighttime only    Oxygen Continuous     Frequency: Continuous     Number of Occurrences: Until Specified     Order Questions:      Device type: High flow      Device: High Flow Nasal Cannula (6 -15 Liters)      LPM: 6-15      Titrate O2 per Oxygen Titration Protocol: Yes      To maintain SpO2 goal of: >= 90%    Pulse Oximetry Continuous     Frequency: Continuous     Number of Occurrences: Until Specified       RECOMMENDATIONS    We recommend: RRT Recs: Continue POC per primary team.      FOLLOW-UP    Please call back the Rapid Response RT, Soniya Corrigan RRT at x [phone] for any questions or concerns.

## 2024-04-24 NOTE — SUBJECTIVE & OBJECTIVE
Interval History: NAEO. He remains on 7 L NC. Lasix increased to 120 TID for adequate diuresis.     Review of Systems  Objective:     Vital Signs (Most Recent):  Temp: 97.8 °F (36.6 °C) (04/24/24 1125)  Pulse: 98 (04/24/24 1125)  Resp: 18 (04/24/24 1125)  BP: 115/68 (04/24/24 1125)  SpO2: (!) 92 % (04/24/24 1125) Vital Signs (24h Range):  Temp:  [97.7 °F (36.5 °C)-98.5 °F (36.9 °C)] 97.8 °F (36.6 °C)  Pulse:  [] 98  Resp:  [15-20] 18  SpO2:  [90 %-97 %] 92 %  BP: (103-117)/(60-72) 115/68     Weight: 102.2 kg (225 lb 5 oz)  Body mass index is 37.49 kg/m².    Intake/Output Summary (Last 24 hours) at 4/24/2024 1531  Last data filed at 4/24/2024 1504  Gross per 24 hour   Intake 833.31 ml   Output 3175 ml   Net -2341.69 ml         Physical Exam  Constitutional:       General: He is not in acute distress.     Appearance: He is obese. He is ill-appearing.   HENT:      Head: Normocephalic and atraumatic.   Eyes:      Extraocular Movements: Extraocular movements intact.      Conjunctiva/sclera: Conjunctivae normal.   Cardiovascular:      Rate and Rhythm: Normal rate and regular rhythm.   Pulmonary:      Effort: No respiratory distress.      Comments: 7 L NC  Abdominal:      Palpations: Abdomen is soft.      Tenderness: There is no abdominal tenderness.   Musculoskeletal:      Right lower leg: Edema present.      Left lower leg: Edema present.   Neurological:      Mental Status: He is alert.         Significant Labs: All pertinent labs within the past 24 hours have been reviewed.    Significant Imaging: I have reviewed all pertinent imaging results/findings within the past 24 hours.

## 2024-04-24 NOTE — ASSESSMENT & PLAN NOTE
"Patient's current symptoms include: Dyspnea on exertion, Fatigue, and Lower extremity edema  Symptoms are worsening since 4/22/24.    Patient's most recent Echo 3/17/24:        Left Ventricle: The left ventricle is normal in size. Septal flattening in diastole consistent with right ventricular volume overload. There is normal systolic function. Ejection fraction by visual approximation is 60%.    Right Ventricle: Severe right ventricular enlargement. Systolic function is severely reduced. See text for details.    Right Atrium: Right atrium is severely dilated.    Tricuspid Valve: There is moderate to severe regurgitation.    Pulmonary Artery: There is severe pulmonary hypertension. The estimated pulmonary artery systolic pressure is 88 mmHg.    IVC/SVC: Elevated venous pressure at 15 mmHg.    Patient's most recent cardiac biomarkers:   Recent Labs   Lab 04/22/24  1819   *       PLAN FOR INITIAL STABILIZATION PER 2022 AHA/ACC/HFSA CHF GUIDELINE:  IV diuresis with Lasix 120 IV TID and monitor response.   Home diuretic regimen: Torsemide 60 BID and metolazone 2.5 QIW + PRN  Statin  Will not repeat TTE since most recent Echo was performed on date as per above.  Maintain on telemetry and daily EKGs   Cardiac diet, with max 2g/day NaCl and fluid restriction at 1500 cc/day.  Strict I/Os and daily weights.  Dry weight 231 lbs.  Weight on admission self-reported as "227 lbs".  Weigh daily using standing scale.  Obtain current risk stratification data: TSH, Lipids, HbA1c with optimization of risk factors is necessary  Check electrolytes daily, keep Mag >2 & K+ >4  SCDs, TEDs, Nursing communication to elevated LE. Ambulate as tolerated.  Referrals to Heart Failure and/or Transitional Care Clinics since he does not have a cardiologist      "

## 2024-04-25 PROBLEM — N18.9 CKD (CHRONIC KIDNEY DISEASE): Chronic | Status: ACTIVE | Noted: 2024-04-25

## 2024-04-25 LAB
ANION GAP SERPL CALC-SCNC: 11 MMOL/L (ref 8–16)
ANION GAP SERPL CALC-SCNC: 12 MMOL/L (ref 8–16)
BASOPHILS # BLD AUTO: 0.04 K/UL (ref 0–0.2)
BASOPHILS NFR BLD: 0.6 % (ref 0–1.9)
BUN SERPL-MCNC: 73 MG/DL (ref 6–20)
BUN SERPL-MCNC: 74 MG/DL (ref 6–20)
CALCIUM SERPL-MCNC: 9.9 MG/DL (ref 8.7–10.5)
CALCIUM SERPL-MCNC: 9.9 MG/DL (ref 8.7–10.5)
CHLORIDE SERPL-SCNC: 85 MMOL/L (ref 95–110)
CHLORIDE SERPL-SCNC: 88 MMOL/L (ref 95–110)
CO2 SERPL-SCNC: 39 MMOL/L (ref 23–29)
CO2 SERPL-SCNC: 40 MMOL/L (ref 23–29)
CREAT SERPL-MCNC: 1.9 MG/DL (ref 0.5–1.4)
CREAT SERPL-MCNC: 1.9 MG/DL (ref 0.5–1.4)
DIFFERENTIAL METHOD BLD: ABNORMAL
EOSINOPHIL # BLD AUTO: 0 K/UL (ref 0–0.5)
EOSINOPHIL NFR BLD: 0.3 % (ref 0–8)
ERYTHROCYTE [DISTWIDTH] IN BLOOD BY AUTOMATED COUNT: 20.3 % (ref 11.5–14.5)
EST. GFR  (NO RACE VARIABLE): 43 ML/MIN/1.73 M^2
EST. GFR  (NO RACE VARIABLE): 43 ML/MIN/1.73 M^2
GLUCOSE SERPL-MCNC: 91 MG/DL (ref 70–110)
GLUCOSE SERPL-MCNC: 97 MG/DL (ref 70–110)
HCT VFR BLD AUTO: 55.1 % (ref 40–54)
HGB BLD-MCNC: 16 G/DL (ref 14–18)
IMM GRANULOCYTES # BLD AUTO: 0.01 K/UL (ref 0–0.04)
IMM GRANULOCYTES NFR BLD AUTO: 0.1 % (ref 0–0.5)
INR PPP: 1.8 (ref 0.8–1.2)
LYMPHOCYTES # BLD AUTO: 1 K/UL (ref 1–4.8)
LYMPHOCYTES NFR BLD: 14.9 % (ref 18–48)
MAGNESIUM SERPL-MCNC: 1.7 MG/DL (ref 1.6–2.6)
MCH RBC QN AUTO: 25.7 PG (ref 27–31)
MCHC RBC AUTO-ENTMCNC: 29 G/DL (ref 32–36)
MCV RBC AUTO: 88 FL (ref 82–98)
MONOCYTES # BLD AUTO: 0.6 K/UL (ref 0.3–1)
MONOCYTES NFR BLD: 9.1 % (ref 4–15)
NEUTROPHILS # BLD AUTO: 5.3 K/UL (ref 1.8–7.7)
NEUTROPHILS NFR BLD: 75 % (ref 38–73)
NRBC BLD-RTO: 0 /100 WBC
PHOSPHATE SERPL-MCNC: 4 MG/DL (ref 2.7–4.5)
PLATELET # BLD AUTO: 173 K/UL (ref 150–450)
PMV BLD AUTO: 10.5 FL (ref 9.2–12.9)
POTASSIUM SERPL-SCNC: 3.3 MMOL/L (ref 3.5–5.1)
POTASSIUM SERPL-SCNC: 3.4 MMOL/L (ref 3.5–5.1)
PROTHROMBIN TIME: 19.2 SEC (ref 9–12.5)
RBC # BLD AUTO: 6.23 M/UL (ref 4.6–6.2)
SODIUM SERPL-SCNC: 135 MMOL/L (ref 136–145)
SODIUM SERPL-SCNC: 140 MMOL/L (ref 136–145)
WBC # BLD AUTO: 7 K/UL (ref 3.9–12.7)

## 2024-04-25 PROCEDURE — 27000221 HC OXYGEN, UP TO 24 HOURS

## 2024-04-25 PROCEDURE — 85025 COMPLETE CBC W/AUTO DIFF WBC: CPT

## 2024-04-25 PROCEDURE — 94760 N-INVAS EAR/PLS OXIMETRY 1: CPT

## 2024-04-25 PROCEDURE — 94640 AIRWAY INHALATION TREATMENT: CPT

## 2024-04-25 PROCEDURE — 94660 CPAP INITIATION&MGMT: CPT

## 2024-04-25 PROCEDURE — 63600175 PHARM REV CODE 636 W HCPCS: Performed by: STUDENT IN AN ORGANIZED HEALTH CARE EDUCATION/TRAINING PROGRAM

## 2024-04-25 PROCEDURE — 94799 UNLISTED PULMONARY SVC/PX: CPT

## 2024-04-25 PROCEDURE — 80048 BASIC METABOLIC PNL TOTAL CA: CPT | Mod: 91

## 2024-04-25 PROCEDURE — 27100171 HC OXYGEN HIGH FLOW UP TO 24 HOURS

## 2024-04-25 PROCEDURE — 36415 COLL VENOUS BLD VENIPUNCTURE: CPT

## 2024-04-25 PROCEDURE — 99900035 HC TECH TIME PER 15 MIN (STAT)

## 2024-04-25 PROCEDURE — 84100 ASSAY OF PHOSPHORUS: CPT

## 2024-04-25 PROCEDURE — 25000003 PHARM REV CODE 250

## 2024-04-25 PROCEDURE — 85610 PROTHROMBIN TIME: CPT | Performed by: STUDENT IN AN ORGANIZED HEALTH CARE EDUCATION/TRAINING PROGRAM

## 2024-04-25 PROCEDURE — 83735 ASSAY OF MAGNESIUM: CPT

## 2024-04-25 PROCEDURE — 63600175 PHARM REV CODE 636 W HCPCS

## 2024-04-25 PROCEDURE — 94761 N-INVAS EAR/PLS OXIMETRY MLT: CPT

## 2024-04-25 PROCEDURE — 36415 COLL VENOUS BLD VENIPUNCTURE: CPT | Performed by: STUDENT IN AN ORGANIZED HEALTH CARE EDUCATION/TRAINING PROGRAM

## 2024-04-25 PROCEDURE — 20600001 HC STEP DOWN PRIVATE ROOM

## 2024-04-25 RX ORDER — TORSEMIDE 20 MG/1
60 TABLET ORAL 2 TIMES DAILY
Status: DISCONTINUED | OUTPATIENT
Start: 2024-04-25 | End: 2024-04-27 | Stop reason: HOSPADM

## 2024-04-25 RX ORDER — METOLAZONE 2.5 MG/1
2.5 TABLET ORAL
Status: DISCONTINUED | OUTPATIENT
Start: 2024-04-26 | End: 2024-04-27 | Stop reason: HOSPADM

## 2024-04-25 RX ADMIN — ALLOPURINOL 300 MG: 100 TABLET ORAL at 08:04

## 2024-04-25 RX ADMIN — FUROSEMIDE 120 MG: 10 INJECTION, SOLUTION INTRAVENOUS at 02:04

## 2024-04-25 RX ADMIN — THERA TABS 1 TABLET: TAB at 08:04

## 2024-04-25 RX ADMIN — FUROSEMIDE 120 MG: 10 INJECTION, SOLUTION INTRAVENOUS at 08:04

## 2024-04-25 RX ADMIN — POTASSIUM CHLORIDE 40 MEQ: 1500 TABLET, EXTENDED RELEASE ORAL at 08:04

## 2024-04-25 RX ADMIN — TIOTROPIUM BROMIDE INHALATION SPRAY 2 PUFF: 3.12 SPRAY, METERED RESPIRATORY (INHALATION) at 09:04

## 2024-04-25 RX ADMIN — Medication 1 CAPSULE: at 08:04

## 2024-04-25 RX ADMIN — PANTOPRAZOLE SODIUM 40 MG: 40 TABLET, DELAYED RELEASE ORAL at 08:04

## 2024-04-25 RX ADMIN — WARFARIN SODIUM 5 MG: 5 TABLET ORAL at 04:04

## 2024-04-25 RX ADMIN — TORSEMIDE 60 MG: 20 TABLET ORAL at 04:04

## 2024-04-25 NOTE — PLAN OF CARE
"[address] - Transplant Stepdown      HOME HEALTH ORDERS  FACE TO FACE ENCOUNTER    Patient Name: Yong Mcintyre  YOB: 1975    PCP: Gianni Escalona MD   PCP Address: [address], [address] / [place] [address]  PCP Phone Number: [phone]  PCP Fax: [phone]    Encounter Date: 4/22/24    Admit to Home Health    Diagnoses:  Active Hospital Problems    Diagnosis  POA    *Acute on chronic heart failure with preserved ejection fraction (HFpEF) [I50.33]  Yes    On Coumadin for atrial fibrillation [I48.91, Z79.01]  Not Applicable     Chronic    Paroxysmal atrial fibrillation [I48.0]  Yes     Chronic    Chronic respiratory failure with hypoxia and hypercapnia [J96.11, J96.12]  Yes     Chronic    Stage 3b chronic kidney disease [N18.32]  Yes     Chronic    Class 2 obesity due to excess calories in adult [E66.09]  Yes    Hypokalemia [E87.6]  Yes     POA, K 3.2  Needs daily BMP      MALLIKA (obstructive sleep apnea) [G47.33]  Yes     Chronic    Hypertension [I10]  Yes     Chronic    Pulmonary hypertension [I27.20]  Yes     Chronic     WHO group 2-3 per his managing pulmonologist (Danyell at [facility])        Resolved Hospital Problems    Diagnosis Date Resolved POA    Chronic diastolic heart failure [I50.32] 04/23/2024 Yes       Follow Up Appointments:  Future Appointments   Date Time Provider Department Center   7/1/2024  3:00 PM Kirt Moreau MD [facility] [place]       Allergies:  Review of patient's allergies indicates:   Allergen Reactions    Lipitor [atorvastatin] Other (See Comments)     "it makes my legs feel like jelly"  Other reaction(s): causes legs to hurt       Medications: Review discharge medications with patient and family and provide education.    Current Facility-Administered Medications   Medication Dose Route Frequency Provider Last Rate Last Admin    acetaminophen tablet 1,000 mg  1,000 mg Oral Q8H PRN Nicholas Castañeda MD   1,000 mg at 04/22/24 2151    albuterol " inhaler 2 puff  2 puff Inhalation Q4H PRN Nicholas Castañeda MD        allopurinoL tablet 300 mg  300 mg Oral Daily Nicholas Castañeda MD   300 mg at 04/25/24 0826    aluminum-magnesium hydroxide-simethicone 200-200-20 mg/5 mL suspension 30 mL  30 mL Oral QID PRN Nicholas Castañeda MD        artificial tears 0.5 % ophthalmic solution 2 drop  2 drop Both Eyes PRN Nicholas Castañeda MD        dextrose 10% bolus 125 mL 125 mL  12.5 g Intravenous PRN Berenice Lara MD        dextrose 10% bolus 250 mL 250 mL  25 g Intravenous PRN Berenice Lara MD        furosemide injection 120 mg  120 mg Intravenous TID Berenice Lara MD   120 mg at 04/25/24 0825    glucagon (human recombinant) injection 1 mg  1 mg Intramuscular PRN Nicholas Castañeda MD        glucose chewable tablet 16 g  16 g Oral PRN Nicholas Castañeda MD        glucose chewable tablet 24 g  24 g Oral PRN Nicholas Castañeda MD        melatonin tablet 6 mg  6 mg Oral Nightly PRN Nicholas Castañeda MD        multivitamin tablet  1 tablet Oral Daily Nicholas Castañeda MD   1 tablet at 04/25/24 0826    naloxone 0.4 mg/mL injection 0.02 mg  0.02 mg Intravenous PRN Nicholas Castañeda MD        omega 3-dha-epa-fish oil capsule 1 capsule  1 capsule Oral Daily Nicholas Castañeda MD   1 capsule at 04/25/24 0826    pantoprazole EC tablet 40 mg  40 mg Oral Daily Nicholas Castañeda MD   40 mg at 04/25/24 0825    potassium chloride SA CR tablet 40 mEq  40 mEq Oral Daily Lizy Atkinson DO   40 mEq at 04/25/24 0825    sodium chloride 0.9% flush 10 mL  10 mL Intravenous Q12H PRN Nicholas Castañeda MD        tiotropium bromide 2.5 mcg/actuation inhaler 2 puff  2 puff Inhalation Daily Nicholas Castañeda MD   2 puff at 04/25/24 0900    warfarin (COUMADIN) tablet 5 mg  5 mg Oral Daily Nicholas Castañeda MD   5 mg at 04/24/24 1738     Current Discharge Medication List        CONTINUE these medications which have NOT CHANGED    Details   acetaminophen (TYLENOL ARTHRITIS ORAL)  Take 2 tablets by mouth daily as needed (pain).      albuterol (VENTOLIN HFA) 90 mcg/actuation inhaler Inhale 2 puffs into the lungs every 4 (four) hours as needed for Wheezing. Rescue  Qty: 18 g, Refills: 2    Associated Diagnoses: Chronic pulmonary heart disease      allopurinoL (ZYLOPRIM) 300 MG tablet Take 1 tablet (300 mg total) by mouth once daily.  Qty: 90 tablet, Refills: 3    Associated Diagnoses: Chronic pulmonary heart disease; Primary pulmonary hypertension; Atrial fibrillation, unspecified type; Pulmonary hypertension; PFO (patent foramen ovale); Paroxysmal atrial fibrillation; Cor pulmonale; Pickwickian syndrome; Hyperthyroidism      ascorbic acid (VITAMIN C ORAL) Take 1 tablet by mouth once daily.      b complex vitamins tablet Take 1 tablet by mouth once daily.      CALCIUM ORAL Take 1 capsule by mouth once daily.      metOLazone (ZAROXOLYN) 2.5 MG tablet Take 1 tablet (2.5 mg total) by mouth every Mon, Wed, Fri, Sun. Take 2.5 mg 4 times a week  and as needed in addition to that  Qty: 16 tablet, Refills: 11    Comments: .      multivit,calc,min/FA/K1/lycop (ONE-A-DAY MEN'S COMPLETE ORAL) Take 1 tablet by mouth once daily.      pantoprazole (PROTONIX) 40 MG tablet Take 1 tablet (40 mg total) by mouth once daily.  Qty: 90 tablet, Refills: 3      potassium chloride (MICRO-K) 10 MEQ CpSR Take 10 mEq by mouth 2 (two) times daily. Take 2 capsules by mouth twice daily.      tiotropium (SPIRIVA) 18 mcg inhalation capsule Inhale 18 mcg into the lungs once daily.      torsemide (DEMADEX) 20 MG Tab Take 3 tablets (60 mg total) by mouth 2 (two) times a day.  Qty: 270 tablet, Refills: 3    Comments: .      warfarin (COUMADIN) 10 MG tablet Take 1/2 tablet (5 mg) by mouth daily or as directed by Coumadin Clinic  Qty: 30 tablet, Refills: 3    Associated Diagnoses: Pickwickian syndrome; Morbid obesity; Chronic cardiopulmonary disease; Cor pulmonale; MALLIKA (obstructive sleep apnea); Chronic pulmonary heart disease; Long  term current use of anticoagulant therapy      WIXELA INHUB 250-50 mcg/dose diskus inhaler 1 puff 2 (two) times daily. USE 1 INHALATION BY MOUTH TWICE DAILY      SPIKEVAX 6090-7540,12Y UP,,PF, 50 mcg/0.5 mL injection Inject 0.5 mLs into the muscle.           STOP taking these medications       omega-3 fatty acids/fish oil (FISH OIL-OMEGA-3 FATTY ACIDS) 300-1,000 mg capsule Comments:   Reason for Stopping:         polyethylene glycol 400 (VISINE DRY EYE RELIEF) 1 % Drop Comments:   Reason for Stopping:                 I have seen and examined this patient within the last 30 days. My clinical findings that support the need for the home health skilled services and home bound status are the following:no   Weakness/numbness causing balance and gait disturbance due to pulmonary hypertension making it taxing to leave home.     Diet:   cardiac diet    Labs:  SN to perform labs:  CBC: Weekly; 4 week(s) and BMP: Weekly; 4 week(s)    Referrals/ Consults  Physical Therapy to evaluate and treat. Evaluate for home safety and equipment needs; Establish/upgrade home exercise program. Perform / instruct on therapeutic exercises, gait training, transfer training, and Range of Motion.  Occupational Therapy to evaluate and treat. Evaluate home environment for safety and equipment needs. Perform/Instruct on transfers, ADL training, ROM, and therapeutic exercises.  Aide to provide assistance with personal care, ADLs, and vital signs.    Activities:   activity as tolerated    Nursing:   Agency to admit patient within 24 hours of hospital discharge unless specified on physician order or at patient request    SN to complete comprehensive assessment including routine vital signs. Instruct on disease process and s/s of complications to report to MD. Review/verify medication list sent home with the patient at time of discharge  and instruct patient/caregiver as needed. Frequency may be adjusted depending on start of care date.     Skilled  nurse to perform up to 3 visits PRN for symptoms related to diagnosis    Notify MD if SBP > 160 or < 90; DBP > 90 or < 50; HR > 120 or < 50; Temp > 101; O2 < 88%    Ok to schedule additional visits based on staff availability and patient request on consecutive days within the home health episode.    When multiple disciplines ordered:    Start of Care occurs on Sunday - Wednesday schedule remaining discipline evaluations as ordered on separate consecutive days following the start of care.    Thursday SOC -schedule subsequent evaluations Friday and Monday the following week.     Friday - Saturday SOC - schedule subsequent discipline evaluations on consecutive days starting Monday of the following week.    For all post-discharge communication and subsequent orders please contact patient's primary care physician.    Miscellaneous   Heart Failure:      SN to instruct on the following:    Instruct on the definition of CHF.   Instruct on the signs/sympoms of CHF to be reported.   Instruct on and monitor daily weights.   Instruct on factors that cause exacerbation.   Instruct on action, dose, schedule, and side effects of medications.   Instruct on diet as prescribed.   Instruct on activity allowed.   Instruct on life-style modifications for life long management of CHF   SN to assess compliance with daily weights, diet, medications, fluid retention,    safety precautions, activities permitted and life-style modifications.   Additional 1-2 SN visits per week as needed for signs and symptoms     of CHF exacerbation.      Home Health Aide:  Nursing Twice weekly, Physical Therapy Three times weekly, Occupational Therapy Three times weekly, and Home Health Aide Twice weekly    Wound Care Orders  no    I certify that this patient is confined to his home and needs intermittent skilled nursing care, physical therapy, and occupational therapy.      Kianna Meade, DO PGY2

## 2024-04-25 NOTE — SUBJECTIVE & OBJECTIVE
Interval History: Diuresed well with Lasix 120 TID (-3.4 L). Starting back home diuretic regimen for (Torsemide 60 BID and Metolazone 2.5 MWFSun). Monitoring UOP tonight and planning for discharge tomorrow.     Review of Systems  Objective:     Vital Signs (Most Recent):  Temp: 98.2 °F (36.8 °C) (04/25/24 1110)  Pulse: 95 (04/25/24 1110)  Resp: 14 (04/25/24 1110)  BP: 102/69 (04/25/24 1110)  SpO2: (!) 93 % (04/25/24 1110) Vital Signs (24h Range):  Temp:  [96.1 °F (35.6 °C)-98.6 °F (37 °C)] 98.2 °F (36.8 °C)  Pulse:  [] 95  Resp:  [14-18] 14  SpO2:  [88 %-98 %] 93 %  BP: ()/(60-76) 102/69     Weight: 101.3 kg (223 lb 5.2 oz)  Body mass index is 37.16 kg/m².    Intake/Output Summary (Last 24 hours) at 4/25/2024 1133  Last data filed at 4/25/2024 0954  Gross per 24 hour   Intake 983.31 ml   Output 4076 ml   Net -3092.69 ml         Physical Exam  Constitutional:       General: He is not in acute distress.     Appearance: He is obese. He is ill-appearing.   HENT:      Head: Normocephalic and atraumatic.   Eyes:      Extraocular Movements: Extraocular movements intact.      Conjunctiva/sclera: Conjunctivae normal.   Cardiovascular:      Rate and Rhythm: Normal rate and regular rhythm.   Pulmonary:      Effort: No respiratory distress.      Comments: 7 L NC  Abdominal:      Palpations: Abdomen is soft.      Tenderness: There is no abdominal tenderness.   Musculoskeletal:      Right lower leg: Edema present.      Left lower leg: Edema present.   Neurological:      Mental Status: He is alert.             Significant Labs: All pertinent labs within the past 24 hours have been reviewed.    Significant Imaging: I have reviewed all pertinent imaging results/findings within the past 24 hours.

## 2024-04-25 NOTE — PLAN OF CARE
Pt aaox4. Independent and ambulatory. VSS. O2 sats >90% on 5-7L HiFlo O2. Becomes SOB c minimal exertion. Denies pain. Tolerated Bipap o/n well. Diuresing well c IVP Lasix. Replacing electrolytes prn. CXR c improved aeration. Pt updated on plan of care.

## 2024-04-25 NOTE — ASSESSMENT & PLAN NOTE
"Patient's current symptoms include: Dyspnea on exertion, Fatigue, and Lower extremity edema  Symptoms are worsening since 4/22/24.    Patient's most recent Echo 3/17/24:        Left Ventricle: The left ventricle is normal in size. Septal flattening in diastole consistent with right ventricular volume overload. There is normal systolic function. Ejection fraction by visual approximation is 60%.    Right Ventricle: Severe right ventricular enlargement. Systolic function is severely reduced. See text for details.    Right Atrium: Right atrium is severely dilated.    Tricuspid Valve: There is moderate to severe regurgitation.    Pulmonary Artery: There is severe pulmonary hypertension. The estimated pulmonary artery systolic pressure is 88 mmHg.    IVC/SVC: Elevated venous pressure at 15 mmHg.    Patient's most recent cardiac biomarkers:   Recent Labs   Lab 04/22/24  1819   *       PLAN FOR INITIAL STABILIZATION PER 2022 AHA/ACC/HFSA CHF GUIDELINE:  IV diuresis with Lasix 120 IV TID and with -3.3L UOP. Home diuretic regimen: Torsemide 60 BID and metolazone 2.5 QIW + PRN  Starting home regimen 4/25 PM and monitoring response  Will not repeat TTE since most recent Echo was performed on date as per above.  Maintain on telemetry and daily EKGs   Cardiac diet, with max 2g/day NaCl and fluid restriction at 1500 cc/day.  Strict I/Os and daily weights.  Dry weight 231 lbs.  Weight on admission self-reported as "227 lbs".  Weigh daily using standing scale.  Obtain current risk stratification data: TSH, Lipids, HbA1c with optimization of risk factors is necessary  Check electrolytes daily, keep Mag >2 & K+ >4  SCDs, TEDs, Nursing communication to elevated LE. Ambulate as tolerated.  Referral to cardiology clinic at discharge      "

## 2024-04-25 NOTE — PLAN OF CARE
04/25/24 1214   Post-Acute Status   Post-Acute Authorization Home Health   Home Health Status Referrals Sent     Met with patient to review discharge recommendation of home health and is agreeable to plan    Patient/family provided list of facilities in-network with patient's payor plan. Providers that are owned, operated, or affiliated with [facility] are included on the list.     Notified that referral sent to below listed facilities from in-network list based on proximity to home/family support:   [facility]       Patient/family instructed to identify preference.    Preferred Facility: (if more than 1, listed in order of descending preference)  [facility]     If an additional preferred facility not listed above is identified, additional referral to be sent. If above facilities unable to accept, will send additional referrals to in-network providers.        Elijah Granados LMSW  Case Management [facility]

## 2024-04-25 NOTE — PLAN OF CARE
CHW scheduled pcp and Cards appointments   Future Appointments   Date Time Provider Department Center   4/26/2024  2:00 PM Harsh Dewitt MD [facility] CARDIO [facility]   5/1/2024  9:30 AM Juliana Goel MD [facility] PCW   7/1/2024  3:00 PM Kirt Moreau MD [facility] [place]

## 2024-04-25 NOTE — RESPIRATORY THERAPY
RAPID RESPONSE RESPIRATORY CHART CHECK       Chart check completed.  Please call [phone] for further concerns or assistance.

## 2024-04-25 NOTE — PLAN OF CARE
Problem: Adult Inpatient Plan of Care  Goal: Plan of Care Review  Outcome: Progressing  Goal: Patient-Specific Goal (Individualized)  Outcome: Progressing  Goal: Absence of Hospital-Acquired Illness or Injury  Outcome: Progressing  Goal: Optimal Comfort and Wellbeing  Outcome: Progressing  Goal: Readiness for Transition of Care  Outcome: Progressing     Problem: Gas Exchange Impaired  Goal: Optimal Gas Exchange  Outcome: Progressing     Problem: Cardiac Output Decreased  Goal: Effective Cardiac Output  Outcome: Progressing     Problem: Obstructive Sleep Apnea Risk or Actual  Goal: Unobstructed Breathing During Sleep  Outcome: Progressing

## 2024-04-25 NOTE — PLAN OF CARE
04/25/24 1516   Post-Acute Status   Post-Acute Authorization Home Health   Home Health Status Set-up Complete/Auth obtained     The patient has been accepted with [facility].     The  notified [facility] about the patient's iris which is tomorrow 4-26-24.    Elijah Granados LMSW  Case Management [facility]

## 2024-04-25 NOTE — RESPIRATORY THERAPY
RAPID RESPONSE RESPIRATORY THERAPY PROACTIVE ROUNDING NOTE             Time of visit: 0935     Code Status: Full Code   : 1975  Bed: 17751/07132 A:   MRN: [id number]  Time spent at the bedside: < 15 min    SITUATION    Evaluated patient for: HFNC Compliance     BACKGROUND    Patient has a past medical history of Arthritis, CHF (congestive heart failure), Cor pulmonale, Gallstones, Hypertension, Morbid obesity, Obesity hypoventilation syndrome, On home oxygen therapy, MALLIKA (obstructive sleep apnea), Paroxysmal atrial fibrillation, PFO (patent foramen ovale), Pickwickian syndrome, and Pulmonary hypertension.    24 Hours Vitals Range:  Temp:  [96.1 °F (35.6 °C)-98.6 °F (37 °C)]   Pulse:  []   Resp:  [14]   BP: ()/(66-76)   SpO2:  [86 %-95 %]     Labs:    Recent Labs     24  0319 24  1800 24  0606 24  0832 24  1150 24  1738 24  0511 24  0832      < >  --    < > 138 139  --  140   K 3.2*   < >  --    < > 3.3* 3.6  --  3.3*   CL 87*   < >  --    < > 89* 86*  --  88*   CO2 37*   < >  --    < > 37* 42*  --  40*   BUN 87*   < >  --    < > 83* 76*  --  73*   CREATININE 2.2*   < >  --    < > 2.1* 2.1*  --  1.9*   GLU 99   < >  --    < > 104 96  --  97   PHOS 3.5  --  4.4  --   --   --  4.0  --    MG 1.4*  --  1.5*  --   --   --  1.7  --     < > = values in this interval not displayed.        Recent Labs     24  0927   PH 7.370   PCO2 82.5*   PO2 24*   HCO3 47.7*   POCSATURATED 37   BE 22*       ASSESSMENT/INTERVENTIONS    Pt in restroom    Last VS   Temp: 98.1 °F (36.7 °C) (1511)  Pulse: 102 (1516)  Resp: 14 (1511)  BP: 106/66 (1511)  SpO2: 86 % (1511)    Level of Consciousness: Level of Consciousness (AVPU): responds to voice  Respiratory Effort: Respiratory Effort: Normal Expansion/Accessory Muscle Usage: Expansion/Accessory Muscles/Retractions: no use of accessory muscles  All Lung Field Breath Sounds: All Lung Fields  Breath Sounds: Anterior:, Lateral:, coarse  O2 Device/Concentration:   Was the O2 device able to be weaned? No  Ambu at bedside:      Active Orders   Respiratory Care    Bipap QHS     Frequency: QHS     Number of Occurrences: Until Specified     Order Questions:      Mode Bilevel      FiO2% 40      Inspiratory pressure: 16      Expiratory pressure: 11      PRN respiratory support needs (if QHS frequency selected): QHS for nighttime only    Oxygen Continuous     Frequency: Continuous     Number of Occurrences: Until Specified     Order Questions:      Device type: High flow      Device: High Flow Nasal Cannula (6 -15 Liters)      LPM: 6-15      Titrate O2 per Oxygen Titration Protocol: Yes      To maintain SpO2 goal of: >= 90%    Pulse Oximetry Continuous     Frequency: Continuous     Number of Occurrences: Until Specified       RECOMMENDATIONS    We recommend: RRT Recs: Continue POC per primary team.      FOLLOW-UP    Please call back the Rapid Response RT, Soniya Corrigan, RRT at x [phone] for any questions or concerns.

## 2024-04-25 NOTE — PROGRESS NOTES
[facility] Medicine  Progress Note    Patient Name: Yong Mcintyre  MRN: [id number]  Patient Class: IP- Inpatient   Admission Date: 4/22/2024  Length of Stay: 3 days  Attending Physician: Berenice Lara MD  Primary Care Provider: Gianni Escalona MD        Subjective:     Principal Problem:Acute on chronic heart failure with preserved ejection fraction (HFpEF)        HPI:  Yong Mcintyre is a 48 y.o. male with Pulmonary HTN (Group 2-3, on 3L home O2, diagnosed prior to 2015, follows with Dr. Child at [facility]), MALLIKA, Obesity hypoventilation syndrome, HFpEF, Paroxysmal AFib (on Coumadin), BMI > 35, HTN, and GERD who presented to [facility] ED on 4/22/24 for evaluation of low blood pressure on his home cuff, fatigue, and chest discomfort.  He says he was in his usual state of health that morning, and even able to work out at the gym.  Then that afternoon he started feeling excessively tired and some pressure throughout his thorax (chest, sides, back) so he took his blood pressure and got 100/60.  He can not identify any precipitating factor, but is concerned that symptoms are the product of over-diuresis so he came in to the ED.  He reports having to stop 3 times to catch his breath on the walk in from the ED overflow parking lot.  He denies chest pain or tightness, and says he feels similar to how he did last time he was in the hospital a month ago.  He reports adherence to a low-salt diet and his home therapeutic regimen, which includes torsemide 20 b.i.d., metolazone 2.5 QIW (newly prescribed during his last admission in March), BiPAP qhs, Spiriva, and Wixela.  He denies recent illness or known sick contacts, but admits he sometimes finds it hard to limit fluid intake.      ED course notable for tachycardia, tachypnea, and SpO2 80 on arrival (having just walked from his car as above) that settled to  BP 100s/50s RR teens SpO2 88-92; afebrile.  WBC WNL HCT 57.5 INR 2.4.  Na  135 K 3.2 Bicarb 35 BUN 89 Crt 2.2 (bl 2.1) eGFR 36.  Trop peaked at 0.018 .  Lactate 1.1.  EKG c/w cardiomegaly but no obvious ST changes.  CXR: Cardiomegaly, prominent pulmonary vasculature, and diffuse opacity c/w pulmonary edema.  He was admitted to Hospital Medicine for further evaluation and treatment of suspected acute on chronic right heart failure in the setting of longstanding pulmonary hypertension.  Of note, this will be his 4th admission to [facility] for similar symptoms within the last 6 months: most recently in March.  He last saw Dr. Child in February after his [place] [facility] admission, and metolazone was added at that time as scheduled 4 times a week and p.r.n. for any weight gain.  His next appointment is scheduled for June.    Overview/Hospital Course:  48 y.o. male with Pulmonary HTN (Group 2-3, on 3L home O2, diagnosed prior to 2015, follows with Dr. Child at [facility]), MALLIKA, Obesity hypoventilation syndrome, HFpEF, Paroxysmal AFib (on Coumadin), BMI > 35, HTN, CKD 3b, and GERD who presented to [facility] ED on 4/22/24 for evaluation of low blood pressure on his home cuff, fatigue, and chest discomfort. ED course notable for tachycardia, tachypnea, and SpO2 80 on arrival (having just walked from his car as above) that settled to  BP 100s/50s RR teens SpO2 88-92; afebrile.  WBC WNL HCT 57.5 INR 2.4.  Na 135 K 3.2 Bicarb 35 BUN 89 Crt 2.2 (bl 2.1) eGFR 36.  Trop peaked at 0.018 .  Lactate 1.1.  EKG c/w cardiomegaly but no obvious ST changes.  CXR: Cardiomegaly, prominent pulmonary vasculature, and diffuse opacity c/w pulmonary edema. He reports adherence to a low-salt diet and his home therapeutic regimen, which includes torsemide 20 b.i.d., metolazone 2.5 QIW (newly prescribed during his last admission in March), BiPAP qhs, Spiriva, and Wixela. His Lasix was titrated to 120 TID for adequate diuresis (-4L/day). He will need follow up with cardiology, nephrology, and pulmonology on  discharge. He can discharge with home health.     Interval History: Diuresed well with Lasix 120 TID (-3.4 L). Starting back home diuretic regimen for (Torsemide 60 BID and Metolazone 2.5 MWFSun). Monitoring UOP tonight and planning for discharge tomorrow.     Review of Systems  Objective:     Vital Signs (Most Recent):  Temp: 98.2 °F (36.8 °C) (04/25/24 1110)  Pulse: 95 (04/25/24 1110)  Resp: 14 (04/25/24 1110)  BP: 102/69 (04/25/24 1110)  SpO2: (!) 93 % (04/25/24 1110) Vital Signs (24h Range):  Temp:  [96.1 °F (35.6 °C)-98.6 °F (37 °C)] 98.2 °F (36.8 °C)  Pulse:  [] 95  Resp:  [14-18] 14  SpO2:  [88 %-98 %] 93 %  BP: ()/(60-76) 102/69     Weight: 101.3 kg (223 lb 5.2 oz)  Body mass index is 37.16 kg/m².    Intake/Output Summary (Last 24 hours) at 4/25/2024 1133  Last data filed at 4/25/2024 0954  Gross per 24 hour   Intake 983.31 ml   Output 4076 ml   Net -3092.69 ml         Physical Exam  Constitutional:       General: He is not in acute distress.     Appearance: He is obese. He is ill-appearing.   HENT:      Head: Normocephalic and atraumatic.   Eyes:      Extraocular Movements: Extraocular movements intact.      Conjunctiva/sclera: Conjunctivae normal.   Cardiovascular:      Rate and Rhythm: Normal rate and regular rhythm.   Pulmonary:      Effort: No respiratory distress.      Comments: 7 L NC  Abdominal:      Palpations: Abdomen is soft.      Tenderness: There is no abdominal tenderness.   Musculoskeletal:      Right lower leg: Edema present.      Left lower leg: Edema present.   Neurological:      Mental Status: He is alert.             Significant Labs: All pertinent labs within the past 24 hours have been reviewed.    Significant Imaging: I have reviewed all pertinent imaging results/findings within the past 24 hours.    Assessment/Plan:      * Acute on chronic heart failure with preserved ejection fraction (HFpEF)  Patient's current symptoms include: Dyspnea on exertion, Fatigue, and Lower  "extremity edema  Symptoms are worsening since 4/22/24.    Patient's most recent Echo 3/17/24:        Left Ventricle: The left ventricle is normal in size. Septal flattening in diastole consistent with right ventricular volume overload. There is normal systolic function. Ejection fraction by visual approximation is 60%.    Right Ventricle: Severe right ventricular enlargement. Systolic function is severely reduced. See text for details.    Right Atrium: Right atrium is severely dilated.    Tricuspid Valve: There is moderate to severe regurgitation.    Pulmonary Artery: There is severe pulmonary hypertension. The estimated pulmonary artery systolic pressure is 88 mmHg.    IVC/SVC: Elevated venous pressure at 15 mmHg.    Patient's most recent cardiac biomarkers:   Recent Labs   Lab 04/22/24  1819   *       PLAN FOR INITIAL STABILIZATION PER 2022 AHA/ACC/HFSA CHF GUIDELINE:  IV diuresis with Lasix 120 IV TID and with -3.3L UOP. Home diuretic regimen: Torsemide 60 BID and metolazone 2.5 QIW + PRN  Starting home regimen 4/25 PM and monitoring response  Will not repeat TTE since most recent Echo was performed on date as per above.  Maintain on telemetry and daily EKGs   Cardiac diet, with max 2g/day NaCl and fluid restriction at 1500 cc/day.  Strict I/Os and daily weights.  Dry weight 231 lbs.  Weight on admission self-reported as "227 lbs".  Weigh daily using standing scale.  Obtain current risk stratification data: TSH, Lipids, HbA1c with optimization of risk factors is necessary  Check electrolytes daily, keep Mag >2 & K+ >4  SCDs, TEDs, Nursing communication to elevated LE. Ambulate as tolerated.  Referral to cardiology clinic at discharge        Paroxysmal atrial fibrillation  On Coumadin    Patient with Paroxysmal (<7 days) atrial fibrillation which is controlled currently with  no medication . Patient is currently in sinus rhythm. Anticoagulation indicated. Anticoagulation done with Coumadin 5mg daily; " continue inpatient .  Will seek assistance with dosing from Pharmacy.    Chronic respiratory failure with hypoxia and hypercapnia  Patient with Hypercapnic and Hypoxic Respiratory failure which is Chronic.  he is on home oxygen at 3 LPM. Supplemental oxygen was provided and noted- Oxygen Concentration (%):  [7-50] 7    Signs/symptoms of respiratory failure include- tachypnea, increased work of breathing, use of accessory muscles, and lethargy. Contributing diagnoses includes -  Pulmonary HTN and HFpEF  Labs and images were reviewed. Patient Has not had a recent ABG. Will treat underlying causes and adjust management of respiratory failure as follows- Diuresis as per HFpEF, continue supplemental O2, continue home Wixela, Spiriva, and albuterol; and BiPAP qhs.    Stage 3b chronic kidney disease  Creatine stable for now. BMP reviewed- noted Estimated Creatinine Clearance: 45.4 mL/min (A) (based on SCr of 2.2 mg/dL (H)). according to latest data. Based on current GFR, CKD stage is stage 3B - GFR 30-45.  Monitor UOP and serial BMP and adjust therapy as needed. Renally dose meds. Avoid nephrotoxic medications and procedures.    Class 2 obesity due to excess calories in adult  Body mass index is 37.77 kg/m². Morbid obesity complicates all aspects of disease management from diagnostic modalities to treatment. Weight loss encouraged and health benefits explained to patient.         Hypertension  Chronic, controlled. Latest blood pressure and vitals reviewed-     Temp:  [98.2 °F (36.8 °C)-98.7 °F (37.1 °C)]   Pulse:  [104-115]   Resp:  [16-28]   BP: ()/(57-69)   SpO2:  [80 %-96 %] .   Home meds for hypertension were reviewed and noted below.   Hypertension Medications               metOLazone (ZAROXOLYN) 2.5 MG tablet Take 1 tablet (2.5 mg total) by mouth every Mon, Wed, Fri, Sun. Take 2.5 mg 4 times a week  and as needed in addition to that    torsemide (DEMADEX) 20 MG Tab Take 3 tablets (60 mg total) by mouth 2 (two)  times a day.            While in the hospital, will manage blood pressure as follows; Adjust home antihypertensive regimen as follows- IV Lasix for the next few days    Will utilize p.r.n. blood pressure medication only if patient's blood pressure greater than 180/110 and he develops symptoms such as worsening chest pain or shortness of breath.    MALLIKA (obstructive sleep apnea)  BiPAP Q 16/11       Pulmonary hypertension  Evaluated by HTS during his last admission to [facility] in March:  - likely WHO group 3, less likely group 2  - [facility] 01/31/2024 - Severe pulmonary hypertension in setting of normal to mildly elevated left sided filling pressure at rest.   - Patient known by our service with no specific treatments for pulmonary hypertension from HTS standpoint  - Is following by pulmonology, who he needs to continue managing disease  Last evaluated by his pulmonologist Dr. Child at [facility] in February: metolazone was added, but no other therapeutic measures besides weight loss/lifestyle were considered.  Next appointment with D[facility] in June.    Treatment as per HFpEF above.    Would likely benefit from more aggressive home diuretic regimen given that this is his 4th admission in 6 months for overload; will address prior to discharge.  Will not place referral to [facility] at this time since he is long-established with [facility]        VTE Risk Mitigation (From admission, onward)           Ordered     warfarin (COUMADIN) tablet 5 mg  Daily         04/22/24 2335     IP VTE HIGH RISK PATIENT  Once         04/22/24 2108     Place sequential compression device  Until discontinued         04/22/24 2108                    Discharge Planning   ESTEBAN: 4/28/2024     Code Status: Full Code   Is the patient medically ready for discharge?:     Reason for patient still in hospital (select all that apply): Patient trending condition  Discharge Plan A: Home Health                  Lizy Atkinson DO  Department of Hospital  Medicine   [facility] - Transplant Stepdown

## 2024-04-26 LAB
ANION GAP SERPL CALC-SCNC: 10 MMOL/L (ref 8–16)
ANION GAP SERPL CALC-SCNC: 10 MMOL/L (ref 8–16)
BASOPHILS # BLD AUTO: 0.05 K/UL (ref 0–0.2)
BASOPHILS NFR BLD: 0.6 % (ref 0–1.9)
BUN SERPL-MCNC: 74 MG/DL (ref 6–20)
BUN SERPL-MCNC: 77 MG/DL (ref 6–20)
CALCIUM SERPL-MCNC: 10.1 MG/DL (ref 8.7–10.5)
CALCIUM SERPL-MCNC: 9.8 MG/DL (ref 8.7–10.5)
CHLORIDE SERPL-SCNC: 85 MMOL/L (ref 95–110)
CHLORIDE SERPL-SCNC: 88 MMOL/L (ref 95–110)
CO2 SERPL-SCNC: 38 MMOL/L (ref 23–29)
CO2 SERPL-SCNC: 42 MMOL/L (ref 23–29)
CREAT SERPL-MCNC: 1.7 MG/DL (ref 0.5–1.4)
CREAT SERPL-MCNC: 2 MG/DL (ref 0.5–1.4)
DIFFERENTIAL METHOD BLD: ABNORMAL
EOSINOPHIL # BLD AUTO: 0.1 K/UL (ref 0–0.5)
EOSINOPHIL NFR BLD: 0.8 % (ref 0–8)
ERYTHROCYTE [DISTWIDTH] IN BLOOD BY AUTOMATED COUNT: 19.6 % (ref 11.5–14.5)
EST. GFR  (NO RACE VARIABLE): 40.4 ML/MIN/1.73 M^2
EST. GFR  (NO RACE VARIABLE): 49.1 ML/MIN/1.73 M^2
GLUCOSE SERPL-MCNC: 113 MG/DL (ref 70–110)
GLUCOSE SERPL-MCNC: 97 MG/DL (ref 70–110)
HCT VFR BLD AUTO: 53.3 % (ref 40–54)
HGB BLD-MCNC: 16 G/DL (ref 14–18)
IMM GRANULOCYTES # BLD AUTO: 0.02 K/UL (ref 0–0.04)
IMM GRANULOCYTES NFR BLD AUTO: 0.3 % (ref 0–0.5)
INR PPP: 1.9 (ref 0.8–1.2)
LYMPHOCYTES # BLD AUTO: 1.2 K/UL (ref 1–4.8)
LYMPHOCYTES NFR BLD: 15 % (ref 18–48)
MAGNESIUM SERPL-MCNC: 1.5 MG/DL (ref 1.6–2.6)
MCH RBC QN AUTO: 26.4 PG (ref 27–31)
MCHC RBC AUTO-ENTMCNC: 30 G/DL (ref 32–36)
MCV RBC AUTO: 88 FL (ref 82–98)
MONOCYTES # BLD AUTO: 0.6 K/UL (ref 0.3–1)
MONOCYTES NFR BLD: 8.2 % (ref 4–15)
NEUTROPHILS # BLD AUTO: 5.9 K/UL (ref 1.8–7.7)
NEUTROPHILS NFR BLD: 75.1 % (ref 38–73)
NRBC BLD-RTO: 0 /100 WBC
PHOSPHATE SERPL-MCNC: 3.9 MG/DL (ref 2.7–4.5)
PLATELET # BLD AUTO: 182 K/UL (ref 150–450)
PMV BLD AUTO: 10.1 FL (ref 9.2–12.9)
POTASSIUM SERPL-SCNC: 3.5 MMOL/L (ref 3.5–5.1)
POTASSIUM SERPL-SCNC: 3.6 MMOL/L (ref 3.5–5.1)
PROTHROMBIN TIME: 19.6 SEC (ref 9–12.5)
RBC # BLD AUTO: 6.06 M/UL (ref 4.6–6.2)
SODIUM SERPL-SCNC: 136 MMOL/L (ref 136–145)
SODIUM SERPL-SCNC: 137 MMOL/L (ref 136–145)
WBC # BLD AUTO: 7.81 K/UL (ref 3.9–12.7)

## 2024-04-26 PROCEDURE — 84100 ASSAY OF PHOSPHORUS: CPT

## 2024-04-26 PROCEDURE — 94761 N-INVAS EAR/PLS OXIMETRY MLT: CPT

## 2024-04-26 PROCEDURE — 25000003 PHARM REV CODE 250

## 2024-04-26 PROCEDURE — 85025 COMPLETE CBC W/AUTO DIFF WBC: CPT

## 2024-04-26 PROCEDURE — 80048 BASIC METABOLIC PNL TOTAL CA: CPT | Mod: 91

## 2024-04-26 PROCEDURE — 27100171 HC OXYGEN HIGH FLOW UP TO 24 HOURS

## 2024-04-26 PROCEDURE — 85610 PROTHROMBIN TIME: CPT | Performed by: STUDENT IN AN ORGANIZED HEALTH CARE EDUCATION/TRAINING PROGRAM

## 2024-04-26 PROCEDURE — 83735 ASSAY OF MAGNESIUM: CPT

## 2024-04-26 PROCEDURE — 36415 COLL VENOUS BLD VENIPUNCTURE: CPT

## 2024-04-26 PROCEDURE — 63600175 PHARM REV CODE 636 W HCPCS

## 2024-04-26 PROCEDURE — 20600001 HC STEP DOWN PRIVATE ROOM

## 2024-04-26 PROCEDURE — 99900035 HC TECH TIME PER 15 MIN (STAT)

## 2024-04-26 RX ORDER — METOLAZONE 2.5 MG/1
2.5 TABLET ORAL ONCE
Status: COMPLETED | OUTPATIENT
Start: 2024-04-26 | End: 2024-04-26

## 2024-04-26 RX ORDER — MAGNESIUM SULFATE HEPTAHYDRATE 40 MG/ML
2 INJECTION, SOLUTION INTRAVENOUS ONCE
Status: COMPLETED | OUTPATIENT
Start: 2024-04-26 | End: 2024-04-26

## 2024-04-26 RX ADMIN — Medication 1 CAPSULE: at 08:04

## 2024-04-26 RX ADMIN — TORSEMIDE 60 MG: 20 TABLET ORAL at 08:04

## 2024-04-26 RX ADMIN — ALLOPURINOL 300 MG: 100 TABLET ORAL at 08:04

## 2024-04-26 RX ADMIN — ACETAMINOPHEN 1000 MG: 500 TABLET ORAL at 08:04

## 2024-04-26 RX ADMIN — POTASSIUM CHLORIDE 40 MEQ: 1500 TABLET, EXTENDED RELEASE ORAL at 08:04

## 2024-04-26 RX ADMIN — METOLAZONE 2.5 MG: 2.5 TABLET ORAL at 04:04

## 2024-04-26 RX ADMIN — TIOTROPIUM BROMIDE INHALATION SPRAY 2 PUFF: 3.12 SPRAY, METERED RESPIRATORY (INHALATION) at 11:04

## 2024-04-26 RX ADMIN — TORSEMIDE 60 MG: 20 TABLET ORAL at 04:04

## 2024-04-26 RX ADMIN — MAGNESIUM SULFATE HEPTAHYDRATE 2 G: 40 INJECTION, SOLUTION INTRAVENOUS at 10:04

## 2024-04-26 RX ADMIN — THERA TABS 1 TABLET: TAB at 08:04

## 2024-04-26 RX ADMIN — METOLAZONE 2.5 MG: 2.5 TABLET ORAL at 11:04

## 2024-04-26 RX ADMIN — PANTOPRAZOLE SODIUM 40 MG: 40 TABLET, DELAYED RELEASE ORAL at 08:04

## 2024-04-26 RX ADMIN — WARFARIN SODIUM 5 MG: 5 TABLET ORAL at 04:04

## 2024-04-26 NOTE — RESPIRATORY THERAPY
"RAPID RESPONSE RESPIRATORY THERAPY PROACTIVE ROUNDING NOTE             Time of visit: 1017     Code Status: Full Code   : 1975  Bed: 46979/16997 A:   MRN: [id number]  Time spent at the bedside: < 15 min    SITUATION    Evaluated patient for: HFNC Compliance     BACKGROUND    Patient has a past medical history of Arthritis, CHF (congestive heart failure), Cor pulmonale, Gallstones, Hypertension, Morbid obesity, Obesity hypoventilation syndrome, On home oxygen therapy, MALLIKA (obstructive sleep apnea), Paroxysmal atrial fibrillation, PFO (patent foramen ovale), Pickwickian syndrome, and Pulmonary hypertension.    24 Hours Vitals Range:  Temp:  [96.8 °F (36 °C)-98.7 °F (37.1 °C)]   Pulse:  []   Resp:  [14-20]   BP: ()/(61-75)   SpO2:  [86 %-97 %]     Labs:    Recent Labs     24  0606 24  0832 24  0511 24  0832 24  1747 24  0550 24  0841   NA  --    < >  --  140 135*  --  136   K  --    < >  --  3.3* 3.4*  --  3.5   CL  --    < >  --  88* 85*  --  88*   CO2  --    < >  --  40* 39*  --  38*   BUN  --    < >  --  73* 74*  --  77*   CREATININE  --    < >  --  1.9* 1.9*  --  1.7*   GLU  --    < >  --  97 91  --  97   PHOS 4.4  --  4.0  --   --  3.9  --    MG 1.5*  --  1.7  --   --  1.5*  --     < > = values in this interval not displayed.        No results for input(s): "PH", "PCO2", "PO2", "HCO3", "POCSATURATED", "BE" in the last 72 hours.    ASSESSMENT/INTERVENTIONS      Last VS   Temp: 98.7 °F (37.1 °C) ( 0804)  Pulse: 89 ( 1116)  Resp: 20 ( 1116)  BP: 107/67 ( 0804)  SpO2: 90 % ( 1116)    Level of Consciousness: Level of Consciousness (AVPU): alert  Respiratory Effort: Respiratory Effort: Unlabored, Normal Expansion/Accessory Muscle Usage: Expansion/Accessory Muscles/Retractions: expansion symmetric, no retractions, no use of accessory muscles  All Lung Field Breath Sounds: All Lung Fields Breath Sounds: Posterior:, crackles, fine  O2 " Device/Concentration: 6L HFNC  Was the O2 device able to be weaned? No  Ambu at bedside: Yes    Active Orders   Respiratory Care    Bipap QHS     Frequency: QHS     Number of Occurrences: Until Specified     Order Questions:      Mode Bilevel      FiO2% 40      Inspiratory pressure: 16      Expiratory pressure: 11      PRN respiratory support needs (if QHS frequency selected): QHS for nighttime only    Oxygen Continuous     Frequency: Continuous     Number of Occurrences: Until Specified     Order Questions:      Device type: High flow      Device: High Flow Nasal Cannula (6 -15 Liters)      LPM: 6-15      Titrate O2 per Oxygen Titration Protocol: Yes      To maintain SpO2 goal of: >= 90%    Pulse Oximetry Continuous     Frequency: Continuous     Number of Occurrences: Until Specified       RECOMMENDATIONS    We recommend: RRT Recs: Continue POC per primary team.      FOLLOW-UP    Please call back the Rapid Response RT, Hamilton Jc RRT at x [phone] for any questions or concerns.

## 2024-04-26 NOTE — SUBJECTIVE & OBJECTIVE
Interval History: Currently on 6 L NC. Trial of adding AM metolazone 2.5 today. Monitoring UOP and discharge tomorrow. Considering metolazone daily if he cannot titrate O2 back to baseline (3L @ home).     Review of Systems  Objective:     Vital Signs (Most Recent):  Temp: 97.7 °F (36.5 °C) (04/26/24 1211)  Pulse: 91 (04/26/24 1211)  Resp: 19 (04/26/24 1211)  BP: 104/76 (04/26/24 1211)  SpO2: (!) 92 % (04/26/24 1211) Vital Signs (24h Range):  Temp:  [96.8 °F (36 °C)-98.7 °F (37.1 °C)] 97.7 °F (36.5 °C)  Pulse:  [] 91  Resp:  [14-20] 19  SpO2:  [86 %-97 %] 92 %  BP: ()/(61-76) 104/76     Weight: 101.7 kg (224 lb 3.3 oz)  Body mass index is 37.31 kg/m².    Intake/Output Summary (Last 24 hours) at 4/26/2024 1449  Last data filed at 4/26/2024 1204  Gross per 24 hour   Intake 150 ml   Output 2800 ml   Net -2650 ml         Physical Exam  Constitutional:       General: He is not in acute distress.     Appearance: He is obese. He is ill-appearing.   HENT:      Head: Normocephalic and atraumatic.   Eyes:      Extraocular Movements: Extraocular movements intact.      Conjunctiva/sclera: Conjunctivae normal.   Cardiovascular:      Rate and Rhythm: Normal rate and regular rhythm.   Pulmonary:      Effort: No respiratory distress.      Breath sounds: Rales present.      Comments: 6 L NC  Abdominal:      Palpations: Abdomen is soft.      Tenderness: There is no abdominal tenderness.      Comments: Abdomen distension at baseline   Musculoskeletal:      Right lower leg: No edema.      Left lower leg: No edema.   Neurological:      Mental Status: He is alert.             Significant Labs: All pertinent labs within the past 24 hours have been reviewed.    Significant Imaging: I have reviewed all pertinent imaging results/findings within the past 24 hours.

## 2024-04-26 NOTE — PLAN OF CARE
Pt aaox4. Independent and ambulatory. VSS. O2 sats >90% on 6 L HiFlo O2.Denies pain. Tolerated Bipap o/n well. Diuresed with IVP lasix- changed to demadex po.- 2.5L UO. Pt unable to be weaned off of 6L as dropped below 90%.  1.5L FR in place.  Plan to dc soon with HH       90

## 2024-04-26 NOTE — PLAN OF CARE
Problem: Adult Inpatient Plan of Care  Goal: Plan of Care Review  Outcome: Progressing  Goal: Patient-Specific Goal (Individualized)  Outcome: Progressing  Goal: Absence of Hospital-Acquired Illness or Injury  Outcome: Progressing  Goal: Optimal Comfort and Wellbeing  Outcome: Progressing  Goal: Readiness for Transition of Care  Outcome: Progressing     Problem: Gas Exchange Impaired  Goal: Optimal Gas Exchange  Outcome: Progressing     Problem: Cardiac Output Decreased  Goal: Effective Cardiac Output  Outcome: Progressing     Problem: Obstructive Sleep Apnea Risk or Actual  Goal: Unobstructed Breathing During Sleep  Outcome: Progressing     Problem: Fall Injury Risk  Goal: Absence of Fall and Fall-Related Injury  Outcome: Progressing

## 2024-04-26 NOTE — PROGRESS NOTES
[facility] - Transplant [facility] Medicine  Progress Note    Patient Name: Yong Mcintyre  MRN: [id number]  Patient Class: IP- Inpatient   Admission Date: 4/22/2024  Length of Stay: 4 days  Attending Physician: Berenice Lara MD  Primary Care Provider: Gianni Escalona MD        Subjective:     Principal Problem:Acute on chronic heart failure with preserved ejection fraction (HFpEF)        HPI:  Yong Mcintyre is a 48 y.o. male with Pulmonary HTN (Group 2-3, on 3L home O2, diagnosed prior to 2015, follows with Dr. Child at [facility]), MALLIKA, Obesity hypoventilation syndrome, HFpEF, Paroxysmal AFib (on Coumadin), BMI > 35, HTN, and GERD who presented to [facility] ED on 4/22/24 for evaluation of low blood pressure on his home cuff, fatigue, and chest discomfort.  He says he was in his usual state of health that morning, and even able to work out at the gym.  Then that afternoon he started feeling excessively tired and some pressure throughout his thorax (chest, sides, back) so he took his blood pressure and got 100/60.  He can not identify any precipitating factor, but is concerned that symptoms are the product of over-diuresis so he came in to the ED.  He reports having to stop 3 times to catch his breath on the walk in from the ED overflow parking lot.  He denies chest pain or tightness, and says he feels similar to how he did last time he was in the hospital a month ago.  He reports adherence to a low-salt diet and his home therapeutic regimen, which includes torsemide 20 b.i.d., metolazone 2.5 QIW (newly prescribed during his last admission in March), BiPAP qhs, Spiriva, and Wixela.  He denies recent illness or known sick contacts, but admits he sometimes finds it hard to limit fluid intake.      ED course notable for tachycardia, tachypnea, and SpO2 80 on arrival (having just walked from his car as above) that settled to  BP 100s/50s RR teens SpO2 88-92; afebrile.  WBC WNL HCT 57.5 INR 2.4.  Na  135 K 3.2 Bicarb 35 BUN 89 Crt 2.2 (bl 2.1) eGFR 36.  Trop peaked at 0.018 .  Lactate 1.1.  EKG c/w cardiomegaly but no obvious ST changes.  CXR: Cardiomegaly, prominent pulmonary vasculature, and diffuse opacity c/w pulmonary edema.  He was admitted to Hospital Medicine for further evaluation and treatment of suspected acute on chronic right heart failure in the setting of longstanding pulmonary hypertension.  Of note, this [place] be [place] [facility] [place] within the last 6 months: most recently in March.  He last saw Dr. Child in February after his [place] [facility] [place], and metolazone was added at that time as scheduled 4 times a week and p.r.n. for any weight gain.  His next appointment is scheduled for June.    Overview/Hospital Course:  48 y.o. male with Pulmonary HTN (Group 2-3, on 3L home O2, diagnosed prior to 2015, follows with Dr. Child at [facility]), MALLIKA, Obesity hypoventilation syndrome, HFpEF, Paroxysmal AFib (on Coumadin), BMI > 35, HTN, CKD 3b, and GERD who presented to [facility] ED on 4/22/24 for evaluation of low blood pressure on his home cuff, fatigue, and chest discomfort. ED course notable for tachycardia, tachypnea, and SpO2 80 on arrival (having just walked from his car as above) that settled to  BP 100s/50s RR teens SpO2 88-92; afebrile.  WBC WNL HCT 57.5 INR 2.4.  Na 135 K 3.2 Bicarb 35 BUN 89 Crt 2.2 (bl 2.1) eGFR 36.  Trop peaked at 0.018 .  Lactate 1.1.  EKG c/w cardiomegaly but no obvious ST changes.  CXR: Cardiomegaly, prominent pulmonary vasculature, and diffuse opacity c/w pulmonary edema. He reports adherence to a low-salt diet and his home therapeutic regimen, which includes torsemide 20 b.i.d., metolazone 2.5 QIW (newly prescribed during his last admission in March), BiPAP qhs, Spiriva, and Wixela. His Lasix was titrated to 120 TID for adequate diuresis (-4L/day). He will need follow up with cardiology, nephrology, and pulmonology on  discharge. He can discharge with home health.     Interval History: Currently on 6 L NC. Trial of adding AM metolazone 2.5 today. Monitoring UOP and discharge tomorrow. Considering metolazone daily if he cannot titrate O2 back to baseline (3L @ home).   Dispo: tomorrow with home health     Review of Systems  Objective:     Vital Signs (Most Recent):  Temp: 97.7 °F (36.5 °C) (04/26/24 1211)  Pulse: 91 (04/26/24 1211)  Resp: 19 (04/26/24 1211)  BP: 104/76 (04/26/24 1211)  SpO2: (!) 92 % (04/26/24 1211) Vital Signs (24h Range):  Temp:  [96.8 °F (36 °C)-98.7 °F (37.1 °C)] 97.7 °F (36.5 °C)  Pulse:  [] 91  Resp:  [14-20] 19  SpO2:  [86 %-97 %] 92 %  BP: ()/(61-76) 104/76     Weight: 101.7 kg (224 lb 3.3 oz)  Body mass index is 37.31 kg/m².    Intake/Output Summary (Last 24 hours) at 4/26/2024 1449  Last data filed at 4/26/2024 1204  Gross per 24 hour   Intake 150 ml   Output 2800 ml   Net -2650 ml         Physical Exam  Constitutional:       General: He is not in acute distress.     Appearance: He is obese. He is ill-appearing.   HENT:      Head: Normocephalic and atraumatic.   Eyes:      Extraocular Movements: Extraocular movements intact.      Conjunctiva/sclera: Conjunctivae normal.   Cardiovascular:      Rate and Rhythm: Normal rate and regular rhythm.   Pulmonary:      Effort: No respiratory distress.      Breath sounds: Rales present.      Comments: 6 L NC  Abdominal:      Palpations: Abdomen is soft.      Tenderness: There is no abdominal tenderness.      Comments: Abdomen distension at baseline   Musculoskeletal:      Right lower leg: No edema.      Left lower leg: No edema.   Neurological:      Mental Status: He is alert.             Significant Labs: All pertinent labs within the past 24 hours have been reviewed.    Significant Imaging: I have reviewed all pertinent imaging results/findings within the past 24 hours.    Assessment/Plan:      * Acute on chronic heart failure with preserved ejection  "fraction (HFpEF)  Patient's current symptoms include: Dyspnea on exertion, Fatigue, and Lower extremity edema  Symptoms are worsening since 4/22/24.    Patient's most recent Echo 3/17/24:        Left Ventricle: The left ventricle is normal in size. Septal flattening in diastole consistent with right ventricular volume overload. There is normal systolic function. Ejection fraction by visual approximation is 60%.    Right Ventricle: Severe right ventricular enlargement. Systolic function is severely reduced. See text for details.    Right Atrium: Right atrium is severely dilated.    Tricuspid Valve: There is moderate to severe regurgitation.    Pulmonary Artery: There is severe pulmonary hypertension. The estimated pulmonary artery systolic pressure is 88 mmHg.    IVC/SVC: Elevated venous pressure at 15 mmHg.    Patient's most recent cardiac biomarkers:   Recent Labs   Lab 04/22/24  1819   *       PLAN FOR INITIAL STABILIZATION PER 2022 AHA/ACC/HFSA CHF GUIDELINE:  IV diuresis with Lasix 120 IV TID and with -3.3L UOP. Home diuretic regimen: Torsemide 60 BID and metolazone 2.5 QIW + PRN  Starting home regimen 4/25 PM and monitoring response  Will not repeat TTE since most recent Echo was performed on date as per above.  Maintain on telemetry and daily EKGs   Cardiac diet, with max 2g/day NaCl and fluid restriction at 1500 cc/day.  Strict I/Os and daily weights.  Dry weight 231 lbs.  Weight on admission self-reported as "227 lbs".  Weigh daily using standing scale.  Obtain current risk stratification data: TSH, Lipids, HbA1c with optimization of risk factors is necessary  Check electrolytes daily, keep Mag >2 & K+ >4  SCDs, TEDs, Nursing communication to elevated LE. Ambulate as tolerated.  Referral to cardiology clinic at discharge        Paroxysmal atrial fibrillation  On Coumadin    Patient with Paroxysmal (<7 days) atrial fibrillation which is controlled currently with  no medication . Patient is currently in " sinus rhythm. Anticoagulation indicated. Anticoagulation done with Coumadin 5mg daily; continue inpatient .  Will seek assistance with dosing from Pharmacy.    Chronic respiratory failure with hypoxia and hypercapnia  Patient with Hypercapnic and Hypoxic Respiratory failure which is Chronic.  he is on home oxygen at 3 LPM. Supplemental oxygen was provided and noted- Oxygen Concentration (%):  [7-50] 7    Signs/symptoms of respiratory failure include- tachypnea, increased work of breathing, use of accessory muscles, and lethargy. Contributing diagnoses includes -  Pulmonary HTN and HFpEF  Labs and images were reviewed. Patient Has not had a recent ABG. Will treat underlying causes and adjust management of respiratory failure as follows- Diuresis as per HFpEF, continue supplemental O2, continue home Wixela, Spiriva, and albuterol; and BiPAP qhs.    Stage 3b chronic kidney disease  Creatine stable for now. BMP reviewed- noted Estimated Creatinine Clearance: 45.4 mL/min (A) (based on SCr of 2.2 mg/dL (H)). according to latest data. Based on current GFR, CKD stage is stage 3B - GFR 30-45.  Monitor UOP and serial BMP and adjust therapy as needed. Renally dose meds. Avoid nephrotoxic medications and procedures.    Class 2 obesity due to excess calories in adult  Body mass index is 37.77 kg/m². Morbid obesity complicates all aspects of disease management from diagnostic modalities to treatment. Weight loss encouraged and health benefits explained to patient.         Hypertension  Chronic, controlled. Latest blood pressure and vitals reviewed-     Temp:  [98.2 °F (36.8 °C)-98.7 °F (37.1 °C)]   Pulse:  [104-115]   Resp:  [16-28]   BP: ()/(57-69)   SpO2:  [80 %-96 %] .   Home meds for hypertension were reviewed and noted below.   Hypertension Medications               metOLazone (ZAROXOLYN) 2.5 MG tablet Take 1 tablet (2.5 mg total) by mouth every Mon, Wed, Fri, Sun. Take 2.5 mg 4 times a week  and as needed in addition  to that    torsemide (DEMADEX) 20 MG Tab Take 3 tablets (60 mg total) by mouth 2 (two) times a day.            While in the hospital, will manage blood pressure as follows; Adjust home antihypertensive regimen as follows- IV Lasix for the next few days    Will utilize p.r.n. blood pressure medication only if patient's blood pressure greater than 180/110 and he develops symptoms such as worsening chest pain or shortness of breath.    MALLIKA (obstructive sleep apnea)  BiPAP Q 16/11       Pulmonary hypertension  Evaluated by HTS during [place] [facility] [place]:  - likely WHO group 3, less likely group 2  - [facility] 01/31/2024 - Severe pulmonary hypertension in setting of normal to mildly elevated left sided filling pressure at rest.   - Patient known by our service with no specific treatments for pulmonary hypertension from HTS standpoint  - Is following by pulmonology, who he needs to continue managing disease  Last evaluated by his pulmonologist Dr. Child at [facility] in February: metolazone was added, but no other therapeutic measures besides weight loss/lifestyle were considered.  Next appointment with Danyell in June.    Treatment as per HFpEF above.    Would likely benefit from more aggressive home diuretic regimen given that this is his 4th admission in 6 months for overload; will address prior to discharge.  Will not place referral to [facility] at this time since he is long-established with [facility]        VTE Risk Mitigation (From admission, onward)           Ordered     warfarin (COUMADIN) tablet 5 mg  Daily         04/22/24 2335     IP VTE HIGH RISK PATIENT  Once         04/22/24 2108     Place sequential compression device  Until discontinued         04/22/24 2108                    Discharge Planning   ESTEBAN: 4/27/2024     Code Status: Full Code   Is the patient medically ready for discharge?:     Reason for patient still in hospital (select all that apply): Patient trending  condition  Discharge Plan A: Home Health                  Lizy Atkinson DO  Department of Hospital Medicine   [facility] - Transplant Stepdown

## 2024-04-27 VITALS
OXYGEN SATURATION: 92 % | TEMPERATURE: 98 F | BODY MASS INDEX: 37.35 KG/M2 | SYSTOLIC BLOOD PRESSURE: 107 MMHG | WEIGHT: 224.19 LBS | DIASTOLIC BLOOD PRESSURE: 68 MMHG | HEIGHT: 65 IN | HEART RATE: 103 BPM | RESPIRATION RATE: 18 BRPM

## 2024-04-27 LAB
ANION GAP SERPL CALC-SCNC: 11 MMOL/L (ref 8–16)
BASOPHILS # BLD AUTO: 0.04 K/UL (ref 0–0.2)
BASOPHILS NFR BLD: 0.5 % (ref 0–1.9)
BUN SERPL-MCNC: 75 MG/DL (ref 6–20)
CALCIUM SERPL-MCNC: 10 MG/DL (ref 8.7–10.5)
CHLORIDE SERPL-SCNC: 84 MMOL/L (ref 95–110)
CO2 SERPL-SCNC: 42 MMOL/L (ref 23–29)
CREAT SERPL-MCNC: 1.8 MG/DL (ref 0.5–1.4)
DIFFERENTIAL METHOD BLD: ABNORMAL
EOSINOPHIL # BLD AUTO: 0 K/UL (ref 0–0.5)
EOSINOPHIL NFR BLD: 0 % (ref 0–8)
ERYTHROCYTE [DISTWIDTH] IN BLOOD BY AUTOMATED COUNT: 20.1 % (ref 11.5–14.5)
EST. GFR  (NO RACE VARIABLE): 45.9 ML/MIN/1.73 M^2
GLUCOSE SERPL-MCNC: 99 MG/DL (ref 70–110)
HCT VFR BLD AUTO: 55.2 % (ref 40–54)
HGB BLD-MCNC: 16.2 G/DL (ref 14–18)
IMM GRANULOCYTES # BLD AUTO: 0.02 K/UL (ref 0–0.04)
IMM GRANULOCYTES NFR BLD AUTO: 0.3 % (ref 0–0.5)
INR PPP: 1.7 (ref 0.8–1.2)
LYMPHOCYTES # BLD AUTO: 1 K/UL (ref 1–4.8)
LYMPHOCYTES NFR BLD: 13.2 % (ref 18–48)
MAGNESIUM SERPL-MCNC: 1.6 MG/DL (ref 1.6–2.6)
MCH RBC QN AUTO: 25.8 PG (ref 27–31)
MCHC RBC AUTO-ENTMCNC: 29.3 G/DL (ref 32–36)
MCV RBC AUTO: 88 FL (ref 82–98)
MONOCYTES # BLD AUTO: 0.7 K/UL (ref 0.3–1)
MONOCYTES NFR BLD: 8.3 % (ref 4–15)
NEUTROPHILS # BLD AUTO: 6.1 K/UL (ref 1.8–7.7)
NEUTROPHILS NFR BLD: 77.7 % (ref 38–73)
NRBC BLD-RTO: 0 /100 WBC
PHOSPHATE SERPL-MCNC: 3.1 MG/DL (ref 2.7–4.5)
PLATELET # BLD AUTO: 175 K/UL (ref 150–450)
PMV BLD AUTO: 10.8 FL (ref 9.2–12.9)
POTASSIUM SERPL-SCNC: 3.6 MMOL/L (ref 3.5–5.1)
PROTHROMBIN TIME: 18.5 SEC (ref 9–12.5)
RBC # BLD AUTO: 6.29 M/UL (ref 4.6–6.2)
SODIUM SERPL-SCNC: 137 MMOL/L (ref 136–145)
WBC # BLD AUTO: 7.86 K/UL (ref 3.9–12.7)

## 2024-04-27 PROCEDURE — 63600175 PHARM REV CODE 636 W HCPCS

## 2024-04-27 PROCEDURE — 25000003 PHARM REV CODE 250

## 2024-04-27 PROCEDURE — 36415 COLL VENOUS BLD VENIPUNCTURE: CPT

## 2024-04-27 PROCEDURE — 85025 COMPLETE CBC W/AUTO DIFF WBC: CPT

## 2024-04-27 PROCEDURE — 94761 N-INVAS EAR/PLS OXIMETRY MLT: CPT

## 2024-04-27 PROCEDURE — 83735 ASSAY OF MAGNESIUM: CPT

## 2024-04-27 PROCEDURE — 84100 ASSAY OF PHOSPHORUS: CPT

## 2024-04-27 PROCEDURE — 94660 CPAP INITIATION&MGMT: CPT

## 2024-04-27 PROCEDURE — 99900035 HC TECH TIME PER 15 MIN (STAT)

## 2024-04-27 PROCEDURE — 80048 BASIC METABOLIC PNL TOTAL CA: CPT

## 2024-04-27 PROCEDURE — 27100171 HC OXYGEN HIGH FLOW UP TO 24 HOURS

## 2024-04-27 PROCEDURE — 85610 PROTHROMBIN TIME: CPT | Performed by: STUDENT IN AN ORGANIZED HEALTH CARE EDUCATION/TRAINING PROGRAM

## 2024-04-27 PROCEDURE — 36415 COLL VENOUS BLD VENIPUNCTURE: CPT | Performed by: STUDENT IN AN ORGANIZED HEALTH CARE EDUCATION/TRAINING PROGRAM

## 2024-04-27 RX ORDER — MAGNESIUM SULFATE HEPTAHYDRATE 40 MG/ML
2 INJECTION, SOLUTION INTRAVENOUS ONCE
Status: COMPLETED | OUTPATIENT
Start: 2024-04-27 | End: 2024-04-27

## 2024-04-27 RX ORDER — SODIUM,POTASSIUM PHOSPHATES 280-250MG
2 POWDER IN PACKET (EA) ORAL
Status: DISCONTINUED | OUTPATIENT
Start: 2024-04-27 | End: 2024-04-27 | Stop reason: HOSPADM

## 2024-04-27 RX ORDER — METOLAZONE 2.5 MG/1
2.5 TABLET ORAL
Qty: 16 TABLET | Refills: 11 | Status: SHIPPED | OUTPATIENT
Start: 2024-04-28 | End: 2025-04-28

## 2024-04-27 RX ADMIN — MAGNESIUM SULFATE HEPTAHYDRATE 2 G: 40 INJECTION, SOLUTION INTRAVENOUS at 08:04

## 2024-04-27 RX ADMIN — TIOTROPIUM BROMIDE INHALATION SPRAY 2 PUFF: 3.12 SPRAY, METERED RESPIRATORY (INHALATION) at 08:04

## 2024-04-27 RX ADMIN — TORSEMIDE 60 MG: 20 TABLET ORAL at 08:04

## 2024-04-27 RX ADMIN — THERA TABS 1 TABLET: TAB at 08:04

## 2024-04-27 RX ADMIN — PANTOPRAZOLE SODIUM 40 MG: 40 TABLET, DELAYED RELEASE ORAL at 08:04

## 2024-04-27 RX ADMIN — Medication 1 CAPSULE: at 08:04

## 2024-04-27 RX ADMIN — ALLOPURINOL 300 MG: 100 TABLET ORAL at 08:04

## 2024-04-27 RX ADMIN — POTASSIUM & SODIUM PHOSPHATES POWDER PACK 280-160-250 MG 2 PACKET: 280-160-250 PACK at 12:04

## 2024-04-27 RX ADMIN — POTASSIUM CHLORIDE 40 MEQ: 1500 TABLET, EXTENDED RELEASE ORAL at 08:04

## 2024-04-27 NOTE — PLAN OF CARE
JUAN F unable to schedule heart failure clinic appt today. Once auth is obtained scheduling will call to schedule with the patient.    JUAN F Conti  [phone]

## 2024-04-27 NOTE — PLAN OF CARE
VSS  No signs of evident distress or complaint of pain  Pt. Ambulatory and independent.  Non slip footwear worn when OOB  BROOKE hose in place  Right AC PIV removed and dressed  4L NC with oxygen saturation greater than 90%  Electrolyte replacement admin as per MAR  Discharge paperwork given to patient.  Pt. Expressed understanding of discharge instructions.  Prescriptions delivered bedside.

## 2024-04-27 NOTE — DISCHARGE SUMMARY
[facility] - Transplant [facility] Medicine  Discharge Summary      Patient Name: Yong Mcintyre  MRN: [id number]  HUEY: [id number]  Patient Class: IP- Inpatient  Admission Date: 4/22/2024  Hospital Length of Stay: 5 days  Discharge Date and Time:  04/27/2024 1:00 PM  Attending Physician: Augie Somers III*   Discharging Provider: Kianna Meade DO  Primary Care Provider: Gianni Escalona MD  [facility] Medicine Team: [place] [address] Kianna Meade DO  Primary Care Team: [place] [id number]    HPI:   oYng Mcintyre is a 48 y.o. male with Pulmonary HTN (Group 2-3, on 3L home O2, diagnosed prior to 2015, follows with Dr. Child at [facility]), MALLIKA, Obesity hypoventilation syndrome, HFpEF, Paroxysmal AFib (on Coumadin), BMI > 35, HTN, and GERD who presented to [facility] ED on 4/22/24 for evaluation of low blood pressure on his home cuff, fatigue, and chest discomfort.  He says he was in his usual state of health that morning, and even able to work out at the gym.  Then that afternoon he started feeling excessively tired and some pressure throughout his thorax (chest, sides, back) so he took his blood pressure and got 100/60.  He can not identify any precipitating factor, but is concerned that symptoms are the product of over-diuresis so he came in to the ED.  He reports having to stop 3 times to catch his breath on the walk in from the ED overflow parking lot.  He denies chest pain or tightness, and says he feels similar to how he did last time he was in the hospital a month ago.  He reports adherence to a low-salt diet and his home therapeutic regimen, which includes torsemide 20 b.i.d., metolazone 2.5 QIW (newly prescribed during his last admission in March), BiPAP qhs, Spiriva, and Wixela.  He denies recent illness or known sick contacts, but admits he sometimes finds it hard to limit fluid intake.      ED course notable for tachycardia, tachypnea, and SpO2 80 on arrival (having just walked from his car as above)  that settled to  BP 100s/50s RR teens SpO2 88-92; afebrile.  WBC WNL HCT 57.5 INR 2.4.  Na 135 K 3.2 Bicarb 35 BUN 89 Crt 2.2 (bl 2.1) eGFR 36.  Trop peaked at 0.018 .  Lactate 1.1.  EKG c/w cardiomegaly but no obvious ST changes.  CXR: Cardiomegaly, prominent pulmonary vasculature, and diffuse opacity c/w pulmonary edema.  He was admitted to Hospital Medicine for further evaluation and treatment of suspected acute on chronic right heart failure in the setting of longstanding pulmonary hypertension.  Of note, this will be his 4th admission to [facility] for similar symptoms within the last 6 months: most recently in March.  He last saw Dr. Child in February after his January [facility] admission, and metolazone was added at that time as scheduled 4 times a week and p.r.n. for any weight gain.  His next appointment is scheduled for June.    * No surgery found *      Hospital Course:   Patient admitted with low blood pressure on his home cuff, fatigue, and chest discomfort. ED course notable for tachycardia, tachypnea, and SpO2 80 on arrival (having just walked from his car as above) that settled to  BP 100s/50s SpO2 88-92 on 7L O2; afebrile.  WBC WNL HCT 57.5 INR 2.4.  Na 135 K 3.2 Bicarb 35 BUN 89 Crt 2.2 (bl 2.1) eGFR 36.  Trop peaked at 0.018 .  Lactate 1.1.  EKG c/w cardiomegaly but no obvious ST changes.  CXR: Cardiomegaly, prominent pulmonary vasculature, and diffuse opacity c/w pulmonary edema. He reports adherence to a low-salt diet and his home therapeutic regimen, which includes torsemide 20 b.i.d., metolazone 2.5 QIW (newly prescribed during his last admission in March), BiPAP qhs, Spiriva, and Wixela. He was treated with IV lasix, titrated to 120 TID with appropriate response. He was then transitioned to home oral regimen. At time of discharge, his fluid status was net negative 11.9L and his O2 requirement had decreased to 4 L/min to maintain SpO2 > 88%.    Stable to discharge to home  with home health. He will continue torsemide 60mg BID as well as metolazone 2.5 mg M, W, F, Sun, as well as with sliding scale metolazone. Referral to heart failure clinic placed, he has also been recommended to follow up with cardiology and nephrology (missed his establish care appointment with nephrology while admitted).     Constitutional:       General: alert, no apparent distress; sitting upright in bed  HENT:      Head: Normocephalic and atraumatic.   Cardiovascular:      Rate and Rhythm: Normal rate and regular rhythm.      Heart sounds: Normal heart sounds.   Pulmonary:      Effort: Pulmonary effort is normal on 4L O2.      Breath sounds: mild bilateral crackles, greatly improved from prior  Musculoskeletal:      Cervical back: Normal range of motion.   Skin:     General: Skin is warm and dry   Neurological:      Mental Status: alert and oriented to person, place, and time.   Psychiatric:         Mood and Affect: Mood normal.         Behavior: Behavior normal.             Goals of Care Treatment Preferences:  Code Status: Full Code      Consults:     No new Assessment & Plan notes have been filed under this hospital service since the last note was generated.  Service: Hospital Medicine    Final Active Diagnoses:    Diagnosis Date Noted POA    PRINCIPAL PROBLEM:  Acute on chronic heart failure with preserved ejection fraction (HFpEF) [I50.33] 05/03/2023 Yes    CKD (chronic kidney disease) [N18.9] 04/25/2024 Yes     Chronic    On Coumadin for atrial fibrillation [I48.91, Z79.01] 04/22/2024 Not Applicable     Chronic    Paroxysmal atrial fibrillation [I48.0] 03/15/2024 Yes     Chronic    Chronic respiratory failure with hypoxia and hypercapnia [J96.11, J96.12] 02/12/2024 Yes     Chronic    Stage 3b chronic kidney disease [N18.32] 12/11/2023 Yes     Chronic    Class 2 obesity due to excess calories in adult [E66.09] 05/03/2023 Yes    Hypokalemia [E87.6] 05/03/2023 Yes    MALLIKA (obstructive sleep apnea) [G47.33]   Yes     Chronic    Hypertension [I10]  Yes     Chronic    Pulmonary hypertension [I27.20] 11/05/2012 Yes     Chronic      Problems Resolved During this Admission:    Diagnosis Date Noted Date Resolved POA    Chronic diastolic heart failure [I50.32] 04/06/2022 04/23/2024 Yes       Discharged Condition: stable    Disposition: Home-Health Care [facility]    Follow Up:    Patient Instructions:      BASIC METABOLIC PANEL   Standing Status: Future Standing Exp. Date: 07/26/25     Ambulatory referral/consult to Nephrology   Standing Status: Future   Referral Priority: Routine Referral Type: Consultation   Referral Reason: Specialty Services Required   Requested Specialty: Nephrology   Number of Visits Requested: 1     Ambulatory referral/consult to Cardiology   Standing Status: Future   Referral Priority: Routine Referral Type: Consultation   Referral Reason: Specialty Services Required   Requested Specialty: Cardiology   Number of Visits Requested: 1     Ambulatory referral/consult to Adult Advanced Heart Failure   Standing Status: Future   Referral Priority: Routine Referral Type: Transplants   Number of Visits Requested: 1       Significant Diagnostic Studies: N/A    Pending Diagnostic Studies:       None           Medications:  Reconciled Home Medications:      Medication List        CHANGE how you take these medications      metOLazone 2.5 MG tablet  Commonly known as: ZAROXOLYN  Take 1 tablet (2.5 mg total) by mouth every Mon, Wed, Fri, Sun. Take 2.5 mg 4 times a week. In addition, if you gain 3lbs in a day or 5 lbs in three days, take an extra dose of metolazone regardless of day of the week  Start taking on: April 28, 2024  What changed: additional instructions     pantoprazole 40 MG tablet  Commonly known as: PROTONIX  Take 1 tablet (40 mg total) by mouth once daily.  What changed: when to take this     warfarin 10 MG tablet  Commonly known as: COUMADIN  Take 1/2 tablet (5 mg) by mouth daily or as directed by Coumadin  Clinic  What changed:   how much to take  how to take this  when to take this  additional instructions            CONTINUE taking these medications      albuterol 90 mcg/actuation inhaler  Commonly known as: VENTOLIN HFA  Inhale 2 puffs into the lungs every 4 (four) hours as needed for Wheezing. Rescue     allopurinoL 300 MG tablet  Commonly known as: ZYLOPRIM  Take 1 tablet (300 mg total) by mouth once daily.     b complex vitamins tablet  Take 1 tablet by mouth once daily.     CALCIUM ORAL  Take 1 capsule by mouth once daily.     ONE-A-DAY MEN'S COMPLETE ORAL  Take 1 tablet by mouth once daily.     potassium chloride 10 MEQ Cpsr  Commonly known as: MICRO-K  Take 10 mEq by mouth 2 (two) times daily. Take 2 capsules by mouth twice daily.     SPIKEVAX [id number](12Y UP)(PF) 50 mcg/0.5 mL injection  Generic drug: COVID ert16-02(12up)(andu)(PF)  Inject 0.5 mLs into the muscle.     tiotropium 18 mcg inhalation capsule  Commonly known as: SPIRIVA  Inhale 18 mcg into the lungs once daily.     torsemide 20 MG Tab  Commonly known as: DEMADEX  Take 3 tablets (60 mg total) by mouth 2 (two) times a day.     TYLENOL ARTHRITIS ORAL  Take 2 tablets by mouth daily as needed (pain).     VITAMIN C ORAL  Take 1 tablet by mouth once daily.     WIXELA INHUB 250-50 mcg/dose diskus inhaler  Generic drug: fluticasone-salmeterol 250-50 mcg/dose  1 puff 2 (two) times daily. USE 1 INHALATION BY MOUTH TWICE DAILY              Indwelling Lines/Drains at time of discharge:   Lines/Drains/Airways       None                   Time spent on the discharge of patient: 40 minutes         Kianna Meade DO  Department of Hospital Medicine  [facility] - Transplant [facility]

## 2024-04-27 NOTE — DISCHARGE INSTRUCTIONS
- Continue torsemide 60mg twice a day as you have been doing  - Continue metolazone 2.5 mg every Monday, Wednesday, Friday, and Sunday as you have been doing. IN ADDITION, continue to weigh yourself daily. If you gain at least 3 pounds in one day or 5 pounds in three days, take and extra dose of metolazone REGARDLESS of the day of the week  - Please repeat labs in one week (approximately Monday, 5/6). You can have this done at any [facility] facility   - Follow up with the heart failure clinic, cardiology, and nephrology. Please call to have these appointments made  - Take an extra dose of warfarin (2.5 mg) tonight, 4/27 and continue to follow with your coumadin clinic    Keep a journal of daily weights and daily (ideally three times daily) blood pressures

## 2024-04-27 NOTE — PLAN OF CARE
CM assigned to patient. Noted discharge order and reviewed handoff and chart. Patient current with [facility]. [facility] message sent to agency, discharge notification. HH orders sent to [facility] 4/25/24. When seen/cleared by attending physician, patient will be ready to discharge from case management standpoint.      Ina Yun RN  Weekend  - [place]  Spectralink: [phone]

## 2024-04-27 NOTE — PLAN OF CARE
Pt aaox4. Independent and ambulatory. VSS. O2 sats >90% on 4L HiFlo. ( Home O2 3L )   Denies pain. Tolerated Bipap o/n well. Diuresed with IVP lasix- changed to demadex po.and metalozone po -voided 3.3L UO/24 hrs . 1.5L FR in place.  Plan to dc soon with HH

## 2024-04-29 NOTE — PLAN OF CARE
[facility] - Transplant Stepdown  Discharge Final Note    Primary Care Provider: Gianni Escalona MD    Expected Discharge Date: 4/27/2024    Final Discharge Note (most recent)       Final Note - 04/27/24 1522          Final Note    Assessment Type Final Discharge Note     Anticipated Discharge Disposition [facility]-[place] Care [facility]     What phone number can be called within the next 1-3 days to see how you are doing after discharge? [phone]        Post-Acute Status    Post-Acute Authorization Home Health     Home Health Status Set-up Complete/Auth obtained     Discharge Delays None known at this time                   Important Message from Medicare  Important Message from Medicare regarding Discharge Appeal Rights: Explained to patient/caregiver, Signed/date by patient/caregiver, Given to patient/caregiver     Date IMM was signed: 04/27/24  Time IMM was signed: 1037        Future Appointments   Date Time Provider Department Center   5/1/2024  9:30 AM Juliana Goel MD [facility]   7/1/2024  3:00 PM Kirt Moreau MD [facility]       Ina Yun RN  Weekend  - [facility] [place]  Spectralink: [phone]

## 2024-04-29 NOTE — PROGRESS NOTES
Spoke to patient regarding discharge medication dosing. A discharge warfarin dose of 5 mg q PM confirmed with patient. INR scheduled for 04.30.24. Orders faxed to [facility] .

## 2024-04-29 NOTE — PROGRESS NOTES
Patient was admitted to the hospital 4/22/24-4/27/24.  He was admitted to Hospital Medicine for further evaluation and treatment of suspected acute on chronic right heart failure in the setting of longstanding pulmonary hypertension.

## 2024-05-01 ENCOUNTER — OFFICE VISIT (OUTPATIENT)
Dept: INTERNAL MEDICINE | Facility: CLINIC | Age: 49
End: 2024-05-01
Payer: MEDICARE

## 2024-05-01 ENCOUNTER — ANTI-COAG VISIT (OUTPATIENT)
Dept: CARDIOLOGY | Facility: CLINIC | Age: 49
End: 2024-05-01
Payer: MEDICARE

## 2024-05-01 VITALS
WEIGHT: 227.31 LBS | HEART RATE: 103 BPM | HEIGHT: 65 IN | SYSTOLIC BLOOD PRESSURE: 106 MMHG | OXYGEN SATURATION: 90 % | DIASTOLIC BLOOD PRESSURE: 60 MMHG | BODY MASS INDEX: 37.87 KG/M2

## 2024-05-01 DIAGNOSIS — N18.32 STAGE 3B CHRONIC KIDNEY DISEASE: Chronic | ICD-10-CM

## 2024-05-01 DIAGNOSIS — J96.21 ACUTE ON CHRONIC RESPIRATORY FAILURE WITH HYPOXIA AND HYPERCAPNIA: ICD-10-CM

## 2024-05-01 DIAGNOSIS — Z09 HOSPITAL DISCHARGE FOLLOW-UP: ICD-10-CM

## 2024-05-01 DIAGNOSIS — J96.22 ACUTE ON CHRONIC RESPIRATORY FAILURE WITH HYPOXIA AND HYPERCAPNIA: ICD-10-CM

## 2024-05-01 DIAGNOSIS — I50.33 ACUTE ON CHRONIC HEART FAILURE WITH PRESERVED EJECTION FRACTION (HFPEF): Primary | ICD-10-CM

## 2024-05-01 DIAGNOSIS — I27.9 CHRONIC PULMONARY HEART DISEASE: Primary | ICD-10-CM

## 2024-05-01 PROBLEM — R42 DIZZINESS: Status: RESOLVED | Noted: 2019-12-07 | Resolved: 2024-05-01

## 2024-05-01 PROBLEM — E66.01 SEVERE OBESITY (BMI >= 40): Status: RESOLVED | Noted: 2023-12-12 | Resolved: 2024-05-01

## 2024-05-01 LAB — INR PPP: 3.8

## 2024-05-01 PROCEDURE — 3074F SYST BP LT 130 MM HG: CPT | Mod: CPTII,S$GLB,, | Performed by: STUDENT IN AN ORGANIZED HEALTH CARE EDUCATION/TRAINING PROGRAM

## 2024-05-01 PROCEDURE — 1160F RVW MEDS BY RX/DR IN RCRD: CPT | Mod: CPTII,S$GLB,, | Performed by: STUDENT IN AN ORGANIZED HEALTH CARE EDUCATION/TRAINING PROGRAM

## 2024-05-01 PROCEDURE — 3008F BODY MASS INDEX DOCD: CPT | Mod: CPTII,S$GLB,, | Performed by: STUDENT IN AN ORGANIZED HEALTH CARE EDUCATION/TRAINING PROGRAM

## 2024-05-01 PROCEDURE — 1159F MED LIST DOCD IN RCRD: CPT | Mod: CPTII,S$GLB,, | Performed by: STUDENT IN AN ORGANIZED HEALTH CARE EDUCATION/TRAINING PROGRAM

## 2024-05-01 PROCEDURE — 93793 ANTICOAG MGMT PT WARFARIN: CPT | Mod: S$GLB,,, | Performed by: PHARMACIST

## 2024-05-01 PROCEDURE — 99213 OFFICE O/P EST LOW 20 MIN: CPT | Mod: S$GLB,,, | Performed by: STUDENT IN AN ORGANIZED HEALTH CARE EDUCATION/TRAINING PROGRAM

## 2024-05-01 PROCEDURE — 3044F HG A1C LEVEL LT 7.0%: CPT | Mod: CPTII,S$GLB,, | Performed by: STUDENT IN AN ORGANIZED HEALTH CARE EDUCATION/TRAINING PROGRAM

## 2024-05-01 PROCEDURE — 3078F DIAST BP <80 MM HG: CPT | Mod: CPTII,S$GLB,, | Performed by: STUDENT IN AN ORGANIZED HEALTH CARE EDUCATION/TRAINING PROGRAM

## 2024-05-01 PROCEDURE — 1111F DSCHRG MED/CURRENT MED MERGE: CPT | Mod: CPTII,S$GLB,, | Performed by: STUDENT IN AN ORGANIZED HEALTH CARE EDUCATION/TRAINING PROGRAM

## 2024-05-01 PROCEDURE — 99999 PR PBB SHADOW E&M-EST. PATIENT-LVL V: CPT | Mod: PBBFAC,,, | Performed by: STUDENT IN AN ORGANIZED HEALTH CARE EDUCATION/TRAINING PROGRAM

## 2024-05-01 NOTE — PATIENT INSTRUCTIONS
Maintain follow-up appointments as scheduled.     Make sure your Cardiology appointment is scheduled - you can verify at check out today and if not they will help you schedule. You can also call [phone] to schedule.

## 2024-05-01 NOTE — PROGRESS NOTES
[facility] FOLLOW-UP VISIT      CHIEF COMPLAINT:   Chief Complaint   Patient presents with    Hospital Follow Up       HISTORY OF PRESENT ILLNESS: Yong Mcintyre is a 48 y.o. male who presents here today for hospital follow-up. Patient was admitted 4/22-4/27 for acute on chronic heart  failure. He was treated with IV lasix initially, eventually transitioned to oral regimen - torsemide 60 mg BID + metolazone 2.5 mg four times weekly + metolazone PRN (one dose of 2.5 mg for any weight gain of 3 lbs in one day or 5 lbs in 3 days). He was advised to F/U with Cardiology & Nephrology.    Since discharge, patient has been feeling much better. Patient denies any chest pain, lightheadedness. Shortness of breath has been stable - on 3-4 L. He does not regularly check pulse ox at home. He has been checking BP at home every now and then - BP  systolic range with only 3 home readings so far. Denies symptoms of low BP. He has home health nursing coming in and they have not mentioned any concerns with his vital signs to him. Patient has been compliant with medications. He did need to take an extra dose of metolazone yesterday for weight gain. He is weighing himself daily. He is trying to restrict to 1.5 L fluid daily. He missed both Nephrology & Cardiology appointment while in the hospital and has re-scheduled his Nephrology appointment to 5/8 but still needs to re-schedule with Cardiology.      REVIEW OF SYSTEMS:    Review of Systems   HENT:  Negative for hearing loss.    Eyes:  Negative for discharge.   Respiratory:  Positive for wheezing.    Cardiovascular:  Negative for chest pain and palpitations.   Gastrointestinal:  Negative for blood in stool, constipation, diarrhea and vomiting.   Genitourinary:  Negative for hematuria and urgency.   Musculoskeletal:  Negative for neck pain.   Neurological:  Negative for weakness and headaches.   Endo/Heme/Allergies:  Negative for polydipsia.     Answers  submitted by the patient for this visit:  Review of Systems Questionnaire (Submitted on 5/1/2024)  activity change: No  unexpected weight change: No  rhinorrhea: Yes  trouble swallowing: No  visual disturbance: No  chest tightness: No  polyuria: No  difficulty urinating: No  joint swelling: No  arthralgias: No  confusion: No  dysphoric mood: No      MEDICAL HISTORY:    Past Medical History:   Diagnosis Date    Arthritis     CHF (congestive heart failure)     Diastolic dysfunction    Cor pulmonale 11/05/2012    Gallstones     Hypertension     Morbid obesity 11/05/2012    Obesity hypoventilation syndrome     On home oxygen therapy 03/07/2014    MALLIKA (obstructive sleep apnea)     BPAP 16/11 with 3 L at night (continuous O2).    Paroxysmal atrial fibrillation     PFO (patent foramen ovale) 11/05/2012    status post closure    Pickwickian syndrome 11/05/2012    Pulmonary hypertension        MEDICATIONS:    Current Outpatient Medications on File Prior to Visit   Medication Sig Dispense Refill    acetaminophen (TYLENOL ARTHRITIS ORAL) Take 2 tablets by mouth daily as needed (pain).      albuterol (VENTOLIN HFA) 90 mcg/actuation inhaler Inhale 2 puffs into the lungs every 4 (four) hours as needed for Wheezing. Rescue (Patient taking differently: Inhale 2 puffs into the lungs every 4 (four) hours as needed for Wheezing or Shortness of Breath. Rescue) 18 g 2    allopurinoL (ZYLOPRIM) 300 MG tablet Take 1 tablet (300 mg total) by mouth once daily. 90 tablet 3    ascorbic acid (VITAMIN C ORAL) Take 1 tablet by mouth once daily.      b complex vitamins tablet Take 1 tablet by mouth once daily.      CALCIUM ORAL Take 1 capsule by mouth once daily.      metOLazone (ZAROXOLYN) 2.5 MG tablet Take 1 tablet (2.5 mg total) by mouth every Mon, Wed, Fri, Sun. Take 2.5 mg 4 times a week. In addition, if you gain 3lbs in a day or 5 lbs in three days, take an extra dose of metolazone regardless of day of the week 16 tablet 11     "multivit,calc,min/FA/K1/lycop (ONE-A-DAY MEN'S COMPLETE ORAL) Take 1 tablet by mouth once daily.      pantoprazole (PROTONIX) 40 MG tablet Take 1 tablet (40 mg total) by mouth once daily. (Patient taking differently: Take 40 mg by mouth every morning.) 90 tablet 3    potassium chloride (MICRO-K) 10 MEQ CpSR Take 10 mEq by mouth 2 (two) times daily. Take 2 capsules by mouth twice daily.      SPIKEVAX 2932-2831,12Y UP,,PF, 50 mcg/0.5 mL injection Inject 0.5 mLs into the muscle.      tiotropium (SPIRIVA) 18 mcg inhalation capsule Inhale 18 mcg into the lungs once daily.      torsemide (DEMADEX) 20 MG Tab Take 3 tablets (60 mg total) by mouth 2 (two) times a day. 270 tablet 3    warfarin (COUMADIN) 10 MG tablet Take 1/2 tablet (5 mg) by mouth daily or as directed by [facility]madin Clinic (Patient taking differently: Take 5 mg by mouth Daily. Take 1/2 Tablet by mouth daily Or as directed by the coumadin clinic.) 30 tablet 3    WIXELA INHUB 250-50 mcg/dose diskus inhaler 1 puff 2 (two) times daily. USE 1 INHALATION BY MOUTH TWICE DAILY      [DISCONTINUED] sildenafil (REVATIO) 20 mg Tab Take 1 tablet (20 mg total) by mouth 3 (three) times daily. 270 tablet prn     No current facility-administered medications on file prior to visit.         PHYSICAL EXAM:    /60   Pulse 103   Ht 5' 5" (1.651 m)   Wt 103.1 kg (227 lb 4.7 oz)   SpO2 (!) 90%   BMI 37.82 kg/m²     Physical Exam  Vitals and nursing note reviewed.   Constitutional:       General: He is not in acute distress.     Appearance: Normal appearance. He is not ill-appearing, toxic-appearing or diaphoretic.   HENT:      Head: Normocephalic and atraumatic.      Nose: Nose normal.   Eyes:      Extraocular Movements: Extraocular movements intact.      Conjunctiva/sclera: Conjunctivae normal.      Pupils: Pupils are equal, round, and reactive to light.   Cardiovascular:      Rate and Rhythm: Normal rate and regular rhythm.      Heart sounds: Normal heart sounds. No " murmur heard.  Pulmonary:      Effort: Pulmonary effort is normal. No respiratory distress.      Breath sounds: Normal breath sounds. No stridor. No wheezing, rhonchi or rales.      Comments: On 4 L O2 via NC  Musculoskeletal:         General: No deformity. Normal range of motion.      Right lower leg: Edema (trace, non-pitting) present.      Left lower leg: Edema (trace, non-pitting) present.   Skin:     Findings: No lesion or rash.   Neurological:      General: No focal deficit present.      Mental Status: He is alert.      Motor: No weakness.      Gait: Gait normal.   Psychiatric:         Mood and Affect: Mood normal.         Behavior: Behavior normal.         Thought Content: Thought content normal.         Judgment: Judgment normal.               ASSESSMENT & PLAN:    Yong was seen today for hospital follow up.    Diagnoses and all orders for this visit:    Acute on chronic heart failure with preserved ejection fraction (HFpEF)  Acute on chronic respiratory failure with hypoxia and hypercapnia  Stage 3b chronic kidney disease  Hospital discharge follow-up  Patient was admitted 4/22-4/27 for acute on chronic heart  failure, treated with IV diuresis and ultimately transitioned to oral regimen - torsemide 60 mg BID + metolazone 2.5 mg four times weekly + metolazone PRN (one dose of 2.5 mg for any weight gain of 3 lbs in one day or 5 lbs in 3 days).  Since discharge, patient reports shortness of breath has been baseline/stable. He denies chest pain, lightheadedness, any acute symptoms. Reports vital signs have been stable. He has been compliant with medications, did have to take extra dose of metolazone yesterday for weight gain.  On exam today patient is on 4 L O2 via NC, pulse ox within goal range at 90%. No respiratory distress. Lungs clear to auscultation - no crackles, wheezes, rales. Very trace peripheral edema without any pitting.   Continue medication regimen as prescribed.  F/U with Nephrology as  scheduled 05/08  F/U with Pulmonology as scheduled 06/12  Re-schedule appointment with Cardiology as he missed in while in the hospital   ER/Return precautions discussed            Juliana Goel MD  [facility]

## 2024-05-01 NOTE — PROGRESS NOTES
[facility]ticoagulation [facility]    05/01/2024 3:17 PM    Assessment/Plan:    Patient presents today with supratherapeutic INR.    Assessment of patient findings and chart review: [facility] for acute on chronic heart  failure. He was treated with IV lasix initially, eventually transitioned to oral regimen - torsemide 60 mg BID + metolazone 2.5 mg four times weekly + metolazone PRN (one dose of 2.5 mg for any weight gain of 3 lbs in one day or 5 lbs in 3 days). He was advised to F/U with Cardiology & Nephrology. Of note, INR likely elevated since patient took a higher than prescribed dose of warfarin (took 10mg x 2 days but does not recall exact days)    Recommendation for patient's warfarin regimen: Hold dose today then decrease maintenance dose    Recommend repeat INR in 1 week  _________________________________________________________________    Yong Mcintyre (48 y.o.) is followed by the [facility].    Anticoagulation Summary  As of 5/1/2024      INR goal:  2.0-3.0   TTR:  54.4% (11.2 y)   INR used for dosing:  3.8 (5/1/2024)   Warfarin maintenance plan:  5 mg (10 mg x 0.5) every day   Weekly warfarin total:  35 mg   Plan last modified:  Shanae Chinchilla, PharmD (12/15/2023)   Next INR check:     Target end date:  Indefinite    Indications    Chronic pulmonary heart disease  unspecified [I27.9]                 Anticoagulation Episode Summary       INR check location:  Home Health     Preferred lab:  [facility]    Send INR reminders to:  [facility] COUMADIN MONITORING POOL    Comments:  3/22/24: [facility]; [facility];  (Ph) [phone], (Fx) [phone]  [facility]//Infusion Suite, every other Mon -- ph [phone]// <VENIPUNCTURE ONLY (POC/meter does not work for pt)>          Anticoagulation Care Providers       Provider Role Specialty Phone number    Pola, Gianni A., MD Responsible Internal Medicine  [phone]            Patient Findings       Positives:  Extra doses    Negatives:  Signs/symptoms of thrombosis, Signs/symptoms of bleeding, Laboratory test error suspected, Change in health, Change in alcohol use, Change in activity, Upcoming invasive procedure, Emergency department visit, Upcoming dental procedure, Missed doses, Change in medications, Change in diet/appetite, Hospital admission, Bruising, Other complaints    Comments:  Pt states he took 10mg 2 days and 5mg all other days

## 2024-05-02 ENCOUNTER — TELEPHONE (OUTPATIENT)
Dept: TRANSPLANT | Facility: CLINIC | Age: 49
End: 2024-05-02
Payer: MEDICARE

## 2024-05-02 DIAGNOSIS — I50.9 CONGESTIVE HEART FAILURE, UNSPECIFIED HF CHRONICITY, UNSPECIFIED HEART FAILURE TYPE: Primary | ICD-10-CM

## 2024-05-03 ENCOUNTER — PATIENT MESSAGE (OUTPATIENT)
Dept: CARDIOLOGY | Facility: CLINIC | Age: 49
End: 2024-05-03
Payer: MEDICARE

## 2024-05-06 ENCOUNTER — TELEPHONE (OUTPATIENT)
Dept: GASTROENTEROLOGY | Facility: CLINIC | Age: 49
End: 2024-05-06
Payer: MEDICARE

## 2024-05-06 DIAGNOSIS — N18.32 STAGE 3B CHRONIC KIDNEY DISEASE: Primary | Chronic | ICD-10-CM

## 2024-05-06 NOTE — TELEPHONE ENCOUNTER
MA spoke to patient, explained there are no sooner dates available, patient says he will keep 7/1 appointment.

## 2024-05-07 ENCOUNTER — LAB VISIT (OUTPATIENT)
Dept: LAB | Facility: HOSPITAL | Age: 49
End: 2024-05-07
Attending: INTERNAL MEDICINE
Payer: MEDICARE

## 2024-05-07 DIAGNOSIS — N18.32 STAGE 3B CHRONIC KIDNEY DISEASE: Chronic | ICD-10-CM

## 2024-05-07 LAB
25(OH)D3+25(OH)D2 SERPL-MCNC: 47 NG/ML (ref 30–96)
ALBUMIN SERPL BCP-MCNC: 3 G/DL (ref 3.5–5.2)
ANION GAP SERPL CALC-SCNC: 10 MMOL/L (ref 8–16)
BASOPHILS # BLD AUTO: 0.04 K/UL (ref 0–0.2)
BASOPHILS NFR BLD: 0.5 % (ref 0–1.9)
BUN SERPL-MCNC: 96 MG/DL (ref 6–20)
CALCIUM SERPL-MCNC: 9.7 MG/DL (ref 8.7–10.5)
CHLORIDE SERPL-SCNC: 85 MMOL/L (ref 95–110)
CO2 SERPL-SCNC: 42 MMOL/L (ref 23–29)
CREAT SERPL-MCNC: 2.5 MG/DL (ref 0.5–1.4)
DIFFERENTIAL METHOD BLD: ABNORMAL
EOSINOPHIL # BLD AUTO: 0.3 K/UL (ref 0–0.5)
EOSINOPHIL NFR BLD: 3.5 % (ref 0–8)
ERYTHROCYTE [DISTWIDTH] IN BLOOD BY AUTOMATED COUNT: 21.2 % (ref 11.5–14.5)
EST. GFR  (NO RACE VARIABLE): 30.9 ML/MIN/1.73 M^2
GLUCOSE SERPL-MCNC: 126 MG/DL (ref 70–110)
HCT VFR BLD AUTO: 57.6 % (ref 40–54)
HGB BLD-MCNC: 16.6 G/DL (ref 14–18)
IMM GRANULOCYTES # BLD AUTO: 0.03 K/UL (ref 0–0.04)
IMM GRANULOCYTES NFR BLD AUTO: 0.4 % (ref 0–0.5)
LYMPHOCYTES # BLD AUTO: 1.5 K/UL (ref 1–4.8)
LYMPHOCYTES NFR BLD: 18.4 % (ref 18–48)
MCH RBC QN AUTO: 26 PG (ref 27–31)
MCHC RBC AUTO-ENTMCNC: 28.8 G/DL (ref 32–36)
MCV RBC AUTO: 90 FL (ref 82–98)
MONOCYTES # BLD AUTO: 0.6 K/UL (ref 0.3–1)
MONOCYTES NFR BLD: 7.1 % (ref 4–15)
NEUTROPHILS # BLD AUTO: 5.6 K/UL (ref 1.8–7.7)
NEUTROPHILS NFR BLD: 70.1 % (ref 38–73)
NRBC BLD-RTO: 0 /100 WBC
PHOSPHATE SERPL-MCNC: 3.2 MG/DL (ref 2.7–4.5)
PHOSPHATE SERPL-MCNC: 3.2 MG/DL (ref 2.7–4.5)
PLATELET # BLD AUTO: 258 K/UL (ref 150–450)
PMV BLD AUTO: 11.1 FL (ref 9.2–12.9)
POTASSIUM SERPL-SCNC: 3.2 MMOL/L (ref 3.5–5.1)
PTH-INTACT SERPL-MCNC: 204.2 PG/ML (ref 9–77)
RBC # BLD AUTO: 6.38 M/UL (ref 4.6–6.2)
SODIUM SERPL-SCNC: 137 MMOL/L (ref 136–145)
WBC # BLD AUTO: 7.93 K/UL (ref 3.9–12.7)

## 2024-05-07 PROCEDURE — 85025 COMPLETE CBC W/AUTO DIFF WBC: CPT | Performed by: INTERNAL MEDICINE

## 2024-05-07 PROCEDURE — 36415 COLL VENOUS BLD VENIPUNCTURE: CPT | Performed by: INTERNAL MEDICINE

## 2024-05-07 PROCEDURE — 83970 ASSAY OF PARATHORMONE: CPT | Performed by: INTERNAL MEDICINE

## 2024-05-07 PROCEDURE — 82306 VITAMIN D 25 HYDROXY: CPT | Performed by: INTERNAL MEDICINE

## 2024-05-07 PROCEDURE — 80069 RENAL FUNCTION PANEL: CPT | Performed by: INTERNAL MEDICINE

## 2024-05-08 ENCOUNTER — OFFICE VISIT (OUTPATIENT)
Dept: NEPHROLOGY | Facility: CLINIC | Age: 49
End: 2024-05-08
Payer: MEDICARE

## 2024-05-08 ENCOUNTER — LAB VISIT (OUTPATIENT)
Dept: LAB | Facility: HOSPITAL | Age: 49
End: 2024-05-08
Attending: INTERNAL MEDICINE
Payer: MEDICARE

## 2024-05-08 VITALS
DIASTOLIC BLOOD PRESSURE: 69 MMHG | HEIGHT: 65 IN | SYSTOLIC BLOOD PRESSURE: 112 MMHG | HEART RATE: 73 BPM | OXYGEN SATURATION: 78 % | BODY MASS INDEX: 38.45 KG/M2 | WEIGHT: 230.81 LBS

## 2024-05-08 DIAGNOSIS — I50.812 CHRONIC RIGHT-SIDED HEART FAILURE: Chronic | ICD-10-CM

## 2024-05-08 DIAGNOSIS — E87.6 HYPOKALEMIA: ICD-10-CM

## 2024-05-08 DIAGNOSIS — R80.9 PROTEINURIA, UNSPECIFIED TYPE: ICD-10-CM

## 2024-05-08 DIAGNOSIS — I27.20 PULMONARY HYPERTENSION: Chronic | ICD-10-CM

## 2024-05-08 DIAGNOSIS — I51.9 RIGHT VENTRICULAR DYSFUNCTION: ICD-10-CM

## 2024-05-08 DIAGNOSIS — N18.32 STAGE 3B CHRONIC KIDNEY DISEASE: Primary | Chronic | ICD-10-CM

## 2024-05-08 DIAGNOSIS — J96.11 CHRONIC RESPIRATORY FAILURE WITH HYPOXIA AND HYPERCAPNIA: Chronic | ICD-10-CM

## 2024-05-08 DIAGNOSIS — R60.9 EDEMA, UNSPECIFIED TYPE: ICD-10-CM

## 2024-05-08 DIAGNOSIS — Z79.01 ON COUMADIN FOR ATRIAL FIBRILLATION: Chronic | ICD-10-CM

## 2024-05-08 DIAGNOSIS — Z99.81 OXYGEN DEPENDENT: ICD-10-CM

## 2024-05-08 DIAGNOSIS — I48.91 ON COUMADIN FOR ATRIAL FIBRILLATION: Chronic | ICD-10-CM

## 2024-05-08 DIAGNOSIS — J96.12 CHRONIC RESPIRATORY FAILURE WITH HYPOXIA AND HYPERCAPNIA: Chronic | ICD-10-CM

## 2024-05-08 DIAGNOSIS — N18.32 STAGE 3B CHRONIC KIDNEY DISEASE: ICD-10-CM

## 2024-05-08 DIAGNOSIS — N18.32 STAGE 3B CHRONIC KIDNEY DISEASE: Primary | ICD-10-CM

## 2024-05-08 LAB
BILIRUB UR QL STRIP: NEGATIVE
CLARITY UR REFRACT.AUTO: CLEAR
COLOR UR AUTO: YELLOW
CREAT UR-MCNC: 41 MG/DL (ref 23–375)
GLUCOSE UR QL STRIP: NEGATIVE
HGB UR QL STRIP: NEGATIVE
KETONES UR QL STRIP: NEGATIVE
LEUKOCYTE ESTERASE UR QL STRIP: NEGATIVE
NITRITE UR QL STRIP: NEGATIVE
PH UR STRIP: 7 [PH] (ref 5–8)
PROT UR QL STRIP: NEGATIVE
PROT UR-MCNC: 10 MG/DL (ref 0–15)
PROT/CREAT UR: 0.24 MG/G{CREAT} (ref 0–0.2)
SP GR UR STRIP: 1.01 (ref 1–1.03)
URN SPEC COLLECT METH UR: NORMAL

## 2024-05-08 PROCEDURE — 3078F DIAST BP <80 MM HG: CPT | Mod: CPTII,S$GLB,, | Performed by: INTERNAL MEDICINE

## 2024-05-08 PROCEDURE — 3066F NEPHROPATHY DOC TX: CPT | Mod: CPTII,S$GLB,, | Performed by: INTERNAL MEDICINE

## 2024-05-08 PROCEDURE — 82570 ASSAY OF URINE CREATININE: CPT | Performed by: INTERNAL MEDICINE

## 2024-05-08 PROCEDURE — 81003 URINALYSIS AUTO W/O SCOPE: CPT | Performed by: INTERNAL MEDICINE

## 2024-05-08 PROCEDURE — 3044F HG A1C LEVEL LT 7.0%: CPT | Mod: CPTII,S$GLB,, | Performed by: INTERNAL MEDICINE

## 2024-05-08 PROCEDURE — 99999 PR PBB SHADOW E&M-EST. PATIENT-LVL IV: CPT | Mod: PBBFAC,,, | Performed by: INTERNAL MEDICINE

## 2024-05-08 PROCEDURE — 1111F DSCHRG MED/CURRENT MED MERGE: CPT | Mod: CPTII,S$GLB,, | Performed by: INTERNAL MEDICINE

## 2024-05-08 PROCEDURE — 3008F BODY MASS INDEX DOCD: CPT | Mod: CPTII,S$GLB,, | Performed by: INTERNAL MEDICINE

## 2024-05-08 PROCEDURE — 3074F SYST BP LT 130 MM HG: CPT | Mod: CPTII,S$GLB,, | Performed by: INTERNAL MEDICINE

## 2024-05-08 PROCEDURE — 99205 OFFICE O/P NEW HI 60 MIN: CPT | Mod: S$GLB,,, | Performed by: INTERNAL MEDICINE

## 2024-05-08 NOTE — PROGRESS NOTES
New Consultation Report  Nephrology      Consult Requested By: PCP  Reason for Consult: CKD, proteinuria    History of Present Illness:  Patient is a 48 y.o. male presents for a new consultation for evaluation of elevated serum creatinine and presumed chronic kidney disease. The patient was found to have an elevated serum creatinine during routine laboratory testing, at 1.9 - 2.5 mg/dL.     The patient reports chronic SOB, LE edema, no hematuria nor foamy urine.     The patient denies taking NSAIDs or new antibiotics, recreational drugs, recent episode of dehydration, diarrhea, nausea or vomiting, acute illness, hospitalization or exposure to IV radiocontrast.     Past Medical History:   Diagnosis Date    Arthritis     CHF (congestive heart failure)     Diastolic dysfunction    Cor pulmonale 11/05/2012    Gallstones     Hypertension     Morbid obesity 11/05/2012    Obesity hypoventilation syndrome     On home oxygen therapy 03/07/2014    MALLIKA (obstructive sleep apnea)     BPAP 16/11 with 3 L at night (continuous O2).    Paroxysmal atrial fibrillation     PFO (patent foramen ovale) 11/05/2012    status post closure    Pickwickian syndrome 11/05/2012    Pulmonary hypertension          Current Outpatient Medications:     acetaminophen (TYLENOL ARTHRITIS ORAL), Take 2 tablets by mouth daily as needed (pain)., Disp: , Rfl:     albuterol (VENTOLIN HFA) 90 mcg/actuation inhaler, Inhale 2 puffs into the lungs every 4 (four) hours as needed for Wheezing. Rescue (Patient taking differently: Inhale 2 puffs into the lungs every 4 (four) hours as needed for Wheezing or Shortness of Breath. Rescue), Disp: 18 g, Rfl: 2    allopurinoL (ZYLOPRIM) 300 MG tablet, Take 1 tablet (300 mg total) by mouth once daily., Disp: 90 tablet, Rfl: 3    ascorbic acid (VITAMIN C ORAL), Take 1 tablet by mouth once daily., Disp: , Rfl:     b complex vitamins tablet, Take 1 tablet by mouth once daily., Disp: , Rfl:     CALCIUM ORAL, Take 1 capsule by  "mouth once daily., Disp: , Rfl:     metOLazone (ZAROXOLYN) 2.5 MG tablet, Take 1 tablet (2.5 mg total) by mouth every Mon, Wed, Fri, Sun. Take 2.5 mg 4 times a week. In addition, if you gain 3lbs in a day or 5 lbs in three days, take an extra dose of metolazone regardless of day of the week, Disp: 16 tablet, Rfl: 11    multivit,calc,min/FA/K1/lycop (ONE-A-DAY MEN'S COMPLETE ORAL), Take 1 tablet by mouth once daily., Disp: , Rfl:     pantoprazole (PROTONIX) 40 MG tablet, Take 1 tablet (40 mg total) by mouth once daily. (Patient taking differently: Take 40 mg by mouth every morning.), Disp: 90 tablet, Rfl: 3    potassium chloride (MICRO-K) 10 MEQ CpSR, Take 10 mEq by mouth 2 (two) times daily. Take 2 capsules by mouth twice daily., Disp: , Rfl:     SPIKEVAX 3378-9778,12Y UP,,PF, 50 mcg/0.5 mL injection, Inject 0.5 mLs into the muscle., Disp: , Rfl:     tiotropium (SPIRIVA) 18 mcg inhalation capsule, Inhale 18 mcg into the lungs once daily., Disp: , Rfl:     torsemide (DEMADEX) 20 MG Tab, Take 3 tablets (60 mg total) by mouth 2 (two) times a day., Disp: 270 tablet, Rfl: 3    warfarin (COUMADIN) 10 MG tablet, Take 1/2 tablet (5 mg) by mouth daily or as directed by Coumadin Clinic (Patient taking differently: Take 5 mg by mouth Daily. Take 1/2 Tablet by mouth daily Or as directed by the coumadin clinic.), Disp: 30 tablet, Rfl: 3    WIXELA INHUB 250-50 mcg/dose diskus inhaler, 1 puff 2 (two) times daily. USE 1 INHALATION BY MOUTH TWICE DAILY, Disp: , Rfl:   Review of patient's allergies indicates:   Allergen Reactions    Lipitor [atorvastatin] Other (See Comments)     "it makes my legs feel like jelly"  Other reaction(s): causes legs to hurt        Past Surgical History:   Procedure Laterality Date    RIGHT HEART CATHETERIZATION Right 3/22/2022    Procedure: INSERTION, CATHETER, RIGHT HEART;  Surgeon: Tony Martínez MD;  Location: [facility] CATH LAB;  Service: Cardiology;  Laterality: Right;    RIGHT HEART CATHETERIZATION " Right 1/31/2024    Procedure: INSERTION, CATHETER, RIGHT HEART;  Surgeon: Sheri Ritter MD;  Location: [facility] CATH LAB;  Service: Cardiology;  Laterality: Right;     Family History   Problem Relation Name Age of Onset    Arthritis Mother      Asthma Mother      Hypertension Father      Asthma Sister      Arthritis Maternal Grandmother      Asthma Maternal Grandmother      Diabetes Maternal Grandmother      Hypertension Maternal Grandmother      Arthritis Maternal Grandfather      Hypertension Maternal Grandfather      Hypertension Paternal Grandfather      Breast cancer Paternal Grandmother      Down syndrome Brother      Gout Brother       Social History     Tobacco Use    Smoking status: Never    Smokeless tobacco: Never   Substance Use Topics    Alcohol use: Yes     Comment: holidays  rare    Drug use: No       Review of Systems   Constitutional: Negative.    Respiratory:  Positive for shortness of breath.    Cardiovascular:  Positive for leg swelling.   Gastrointestinal: Negative.    Genitourinary: Negative.    Musculoskeletal: Negative.    Skin: Negative.    All other systems reviewed and are negative.      Vitals:    05/08/24 1452   BP: 112/69   Pulse: 73       PHYSICAL EXAMINATION:  General: no distress, well nourished  Skin: color, texture, turgor normal. No rash or lesions  HEENT: Eyes: reactive pupils, normal conjunctiva. Oral mucosa moist, no ulcers. Throat: no erythema.  Neck: supple, symmetrical, trachea midline, no JVD, no carotid bruit  Lungs: clear to auscultation bilaterally and normal respiratory effort  Cardiovascular: Heart: regular rate and rhythm, S1, S2 normal, no murmur, rub or gallop. Pulses: 2+ and symmetric.  Abdomen: bowel sounds present, no abdominal bruit, soft, non-tender non-distented; no masses, organomegaly or ascites.   Musculoskeletal: 1+ pitting edema in lower extremities, no clubbing or cyanosis  Lymph Nodes: No cervical or supraclavicular adenopathy  Neurologic: AAOx3, normal  strength and tone. No focal deficit. No asterixis.       LABORATORY DATA:  Lab Results   Component Value Date    CREATININE 2.5 (H) 05/07/2024       Prot/Creat Ratio, Urine   Date Value Ref Range Status   04/22/2024 2.54 (H) 0.00 - 0.20 Final       Lab Results   Component Value Date     05/07/2024    K 3.2 (L) 05/07/2024    CO2 42 (HH) 05/07/2024       Lab Results   Component Value Date    .2 (H) 05/07/2024    CALCIUM 9.7 05/07/2024    PHOS 3.2 05/07/2024    PHOS 3.2 05/07/2024       Lab Results   Component Value Date    HGB 16.6 05/07/2024        Lab Results   Component Value Date    HGBA1C 5.8 (H) 01/19/2024       Lab Results   Component Value Date    LDLCALC 88.0 03/16/2022         IMAGING STUDIES  Kidney US: Reviewed  Echocardiogram: Reviewed    IMPRESSION / RECOMMENDATIONS:     1. Stage 3b chronic kidney disease  Ambulatory referral/consult to Nephrology. Impaired kidney function for the last 8 months. No significant proteinuria. No clear etiology, possible CRS type 2 from severe right ventricular dysfunction. No anemia of CKD. Will obtain a renal US      2. Proteinuria, unspecified type  Last measurement negative, unclear why previous reading was +, will repeat      3. Pulmonary hypertension  Managed by PCP      4. Chronic right-sided heart failure  As above      5. Right ventricular dysfunction  As above      6. On Coumadin for atrial fibrillation  Managed by Cardiology      7. Chronic respiratory failure with hypoxia and hypercapnia  Chronic, along with intermittent post hypercapneic metabolic alkalosis. Asymptomatic      8. Edema, unspecified type  Stable, mild, continue torsemide      9. Hypokalemia  On K supplementation            SUMMARY OF PLAN:  Continue torsemide  Continue KCl  Renal US  Avoid NSAIDs  Repeat BMP, UPCR  6.   RTC in 4 mo

## 2024-05-09 ENCOUNTER — TELEPHONE (OUTPATIENT)
Dept: TRANSPLANT | Facility: CLINIC | Age: 49
End: 2024-05-09
Payer: MEDICARE

## 2024-05-09 NOTE — TELEPHONE ENCOUNTER
Contacted Mr. Mcintyre about virtual appointment. He states that he is trying to make an appointment with a cardiologist. He has a PH specialist - Dr. Freitas and follows with him regularly.    Has also completed TCC program.    Advised that we will cancel his appointment today, but will facilitate finding a cardiologist.    Spoke to Cardiology manager.    Wanda Russell DNP, APRN  Pulmonary Hypertension Department

## 2024-05-13 ENCOUNTER — TELEPHONE (OUTPATIENT)
Dept: GASTROENTEROLOGY | Facility: CLINIC | Age: 49
End: 2024-05-13
Payer: MEDICARE

## 2024-05-13 ENCOUNTER — TELEPHONE (OUTPATIENT)
Dept: INTERNAL MEDICINE | Facility: CLINIC | Age: 49
End: 2024-05-13
Payer: MEDICARE

## 2024-05-13 NOTE — TELEPHONE ENCOUNTER
----- Message from Isabel Wynne sent at 5/13/2024 10:59 AM CDT -----  Would like to receive medical advice.    Sana calling from [employer],  issues identify doing a post discharged medication.    Would they like a call back or a response via [facility]:  call    Additional information:  Please call Sana at [phone]

## 2024-05-13 NOTE — TELEPHONE ENCOUNTER
Saba hull at [facility] informed returning call about medications.    [facility] want to know if you want to restart pt on Metoprolol 25 mg since it is not his current medication list.    Please review and respond,  Thank You.

## 2024-05-13 NOTE — TELEPHONE ENCOUNTER
Patient stated that he will keep appt on 7/11 when patient spoke with Braeden on a different day because we do not have any sooner appts with this provider

## 2024-05-17 NOTE — PROGRESS NOTES
5/17/24  Sathya with [facility] called to inquire as to when next INR was due, informed Sathya it was missed 5/07, reports no order was received and Patient had labs in clinic 5/07, told Sathya no INR was drawn, nurse can draw INR today but is sent out to [facility], asked Sathya to have INR drawn Monday 5/20, date changed and order faxed

## 2024-05-20 NOTE — PROGRESS NOTES
5/20/24 Clary with [facility] called to report Patient refused visit today, said to return tomorrow 5/21/24, will try to get INR 5/21, date was changed and order was faxed to HH

## 2024-05-21 LAB — INR PPP: 3.1

## 2024-05-22 ENCOUNTER — ANTI-COAG VISIT (OUTPATIENT)
Dept: CARDIOLOGY | Facility: CLINIC | Age: 49
End: 2024-05-22
Payer: MEDICARE

## 2024-05-22 DIAGNOSIS — I27.9 CHRONIC PULMONARY HEART DISEASE: Primary | ICD-10-CM

## 2024-05-22 PROCEDURE — 93793 ANTICOAG MGMT PT WARFARIN: CPT | Mod: S$GLB,,,

## 2024-05-22 NOTE — PROGRESS NOTES
5/22/24  Sathya with [facility]  called to reschedule 5/27 lab draw date to 5/28 due to 5/27 being a holiday and [facility] will not be open (HH is contracted with [facility]), also reports Insurance is not allowing any more authorized visits so Patient will be discharged from  service 6/04/24,

## 2024-05-22 NOTE — PROGRESS NOTES
[facility]    05/22/2024 2:34 PM    Assessment/Plan:    Patient presents today with supratherapeutic INR.    Assessment of patient findings and chart review: INR slightly above goal. Medications and chart reviewed. No changes to note.     Recommendation for patient's warfarin regimen: Hold dose today then resume current maintenance dose    Recommend repeat INR in 1 week  _________________________________________________________________    Yong Mcintyre (48 y.o.) is followed by the [facility].    Anticoagulation Summary  As of 5/22/2024      INR goal:  2.0-3.0   TTR:  54.2% (11.3 y)   INR used for dosing:  3.1 (5/21/2024)   Warfarin maintenance plan:  5 mg (10 mg x 0.5) every day   Weekly warfarin total:  35 mg   Plan last modified:  Shanae Chinchilla, PharmD (12/15/2023)   Next INR check:  5/27/2024   Target end date:  Indefinite    Indications    Chronic pulmonary heart disease  unspecified [I27.9]                 Anticoagulation Episode Summary       INR check location:  Home Health     Preferred lab:  [facility]    [facility] reminders [facility]:  [facility]    Comments:  3/22/24: STAT HH; STAT HH;  (Ph) [phone], (Fx) [phone]  [facility] [place]//Infusion Suite, every other Mon -- ph [phone]// <VENIPUNCTURE ONLY (POC/meter does not work for pt)>          Anticoagulation Care Providers       Provider Role Specialty Phone number    Gianni Escalona MD [facility] Internal Medicine [phone]

## 2024-05-27 ENCOUNTER — OFFICE VISIT (OUTPATIENT)
Dept: INTERNAL MEDICINE | Facility: CLINIC | Age: 49
End: 2024-05-27
Payer: MEDICARE

## 2024-05-27 VITALS
HEART RATE: 114 BPM | BODY MASS INDEX: 37.98 KG/M2 | OXYGEN SATURATION: 75 % | WEIGHT: 227.94 LBS | DIASTOLIC BLOOD PRESSURE: 54 MMHG | HEIGHT: 65 IN | SYSTOLIC BLOOD PRESSURE: 112 MMHG

## 2024-05-27 DIAGNOSIS — N25.81 HYPERPARATHYROIDISM, SECONDARY: ICD-10-CM

## 2024-05-27 DIAGNOSIS — J96.11 CHRONIC RESPIRATORY FAILURE WITH HYPOXIA AND HYPERCAPNIA: Chronic | ICD-10-CM

## 2024-05-27 DIAGNOSIS — M10.00 IDIOPATHIC GOUT, UNSPECIFIED CHRONICITY, UNSPECIFIED SITE: ICD-10-CM

## 2024-05-27 DIAGNOSIS — I50.33 ACUTE ON CHRONIC HEART FAILURE WITH PRESERVED EJECTION FRACTION (HFPEF): ICD-10-CM

## 2024-05-27 DIAGNOSIS — I50.812 CHRONIC RIGHT-SIDED HEART FAILURE: Chronic | ICD-10-CM

## 2024-05-27 DIAGNOSIS — Z99.81 DEPENDENCE ON SUPPLEMENTAL OXYGEN: ICD-10-CM

## 2024-05-27 DIAGNOSIS — J96.12 CHRONIC RESPIRATORY FAILURE WITH HYPOXIA AND HYPERCAPNIA: Chronic | ICD-10-CM

## 2024-05-27 DIAGNOSIS — G47.33 OSA (OBSTRUCTIVE SLEEP APNEA): Chronic | ICD-10-CM

## 2024-05-27 DIAGNOSIS — J44.89 CHRONIC ASTHMATIC BRONCHITIS: Chronic | ICD-10-CM

## 2024-05-27 DIAGNOSIS — E66.01 CLASS 2 SEVERE OBESITY DUE TO EXCESS CALORIES WITH SERIOUS COMORBIDITY AND BODY MASS INDEX (BMI) OF 37.0 TO 37.9 IN ADULT: ICD-10-CM

## 2024-05-27 DIAGNOSIS — Q21.12 PFO (PATENT FORAMEN OVALE): Chronic | ICD-10-CM

## 2024-05-27 DIAGNOSIS — Z00.00 ENCOUNTER FOR PREVENTIVE HEALTH EXAMINATION: Primary | ICD-10-CM

## 2024-05-27 DIAGNOSIS — Z00.00 ENCOUNTER FOR MEDICARE ANNUAL WELLNESS EXAM: ICD-10-CM

## 2024-05-27 DIAGNOSIS — I48.0 PAROXYSMAL ATRIAL FIBRILLATION: Chronic | ICD-10-CM

## 2024-05-27 DIAGNOSIS — N18.32 STAGE 3B CHRONIC KIDNEY DISEASE: Chronic | ICD-10-CM

## 2024-05-27 DIAGNOSIS — I27.9 CHRONIC PULMONARY HEART DISEASE: ICD-10-CM

## 2024-05-27 DIAGNOSIS — Z99.81 OXYGEN DEPENDENT: ICD-10-CM

## 2024-05-27 DIAGNOSIS — I10 PRIMARY HYPERTENSION: Chronic | ICD-10-CM

## 2024-05-27 PROBLEM — N18.9 CKD (CHRONIC KIDNEY DISEASE): Chronic | Status: RESOLVED | Noted: 2024-04-25 | Resolved: 2024-05-27

## 2024-05-27 PROBLEM — R60.9 EDEMA: Status: RESOLVED | Noted: 2024-05-08 | Resolved: 2024-05-27

## 2024-05-27 PROCEDURE — 3074F SYST BP LT 130 MM HG: CPT | Mod: CPTII,S$GLB,, | Performed by: NURSE PRACTITIONER

## 2024-05-27 PROCEDURE — 99999 PR PBB SHADOW E&M-EST. PATIENT-LVL V: CPT | Mod: PBBFAC,,, | Performed by: NURSE PRACTITIONER

## 2024-05-27 PROCEDURE — G0439 PPPS, SUBSEQ VISIT: HCPCS | Mod: S$GLB,,, | Performed by: NURSE PRACTITIONER

## 2024-05-27 PROCEDURE — 3066F NEPHROPATHY DOC TX: CPT | Mod: CPTII,S$GLB,, | Performed by: NURSE PRACTITIONER

## 2024-05-27 PROCEDURE — 1160F RVW MEDS BY RX/DR IN RCRD: CPT | Mod: CPTII,S$GLB,, | Performed by: NURSE PRACTITIONER

## 2024-05-27 PROCEDURE — 3044F HG A1C LEVEL LT 7.0%: CPT | Mod: CPTII,S$GLB,, | Performed by: NURSE PRACTITIONER

## 2024-05-27 PROCEDURE — 1159F MED LIST DOCD IN RCRD: CPT | Mod: CPTII,S$GLB,, | Performed by: NURSE PRACTITIONER

## 2024-05-27 PROCEDURE — 3078F DIAST BP <80 MM HG: CPT | Mod: CPTII,S$GLB,, | Performed by: NURSE PRACTITIONER

## 2024-05-27 NOTE — PATIENT INSTRUCTIONS
Counseling and Referral of Other Preventative  (Italic type indicates deductible and co-insurance are waived)    Patient Name: Yong Mcintyre  Today's Date: 5/27/2024    Health Maintenance       Date Due Completion Date    TETANUS VACCINE Never done ---    Pneumococcal Vaccines (Age 0-64) (2 of 2 - PCV) 10/23/2018 10/23/2017    Colorectal Cancer Screening Never done ---    COVID-19 Vaccine (6 - 2023-24 season) 09/01/2023 9/30/2022    Hemoglobin A1c (Diabetic Prevention Screening) 01/19/2027 1/19/2024    Lipid Panel 03/16/2027 3/16/2022        No orders of the defined types were placed in this encounter.      The following information is provided to all patients.  This information is to help you find resources for any of the problems found today that may be affecting your health:                  Living healthy guide: www.[facility].[place].gov      Understanding Diabetes: www.diabetes.org      Eating healthy: www.cdc.gov/healthyweight      CDC home safety checklist: www.cdc.gov/steadi/patient.html      Agency on Aging: www.goea.[place].[facility]      Alcoholics anonymous (AA): www.aa.org      Physical Activity: www.blossom.nih.gov/oa1onbx      Tobacco use: www.quitwithusla.org

## 2024-05-27 NOTE — PROGRESS NOTES
"  Yong Mcintyre presented for a follow-up Medicare AWV today. The following components were reviewed and updated:    Medical history  Family History  Social history  Allergies and Current Medications  Health Risk Assessment  Health Maintenance  Care Team    **See Completed Assessments for Annual Wellness visit with in the encounter summary    The following assessments were completed:  Depression Screening  Cognitive function Screening    Timed Get Up Test  Whisper Test      Opioid documentation:      Patient does not have a current opioid prescription.          Vitals:    05/27/24 1453 05/27/24 1508   BP:  (!) 112/54   BP Location:  Left arm   Pulse:  (!) 114   SpO2:  (!) 75%   Weight: 103.4 kg (227 lb 15.3 oz)    Height: 5' 5" (1.651 m)      Body mass index is 37.93 kg/m².       Physical Exam  Vitals and nursing note reviewed.   Constitutional:       Appearance: He is well-developed.   HENT:      Head: Normocephalic.   Cardiovascular:      Rate and Rhythm: Normal rate and regular rhythm.      Heart sounds: Normal heart sounds. No murmur heard.  Pulmonary:      Effort: Pulmonary effort is normal.      Breath sounds: Normal breath sounds.   Abdominal:      General: Bowel sounds are normal.      Palpations: Abdomen is soft.   Musculoskeletal:         General: Normal range of motion.   Skin:     General: Skin is warm and dry.   Neurological:      Mental Status: He is alert and oriented to person, place, and time.      Motor: No abnormal muscle tone.   Psychiatric:         Mood and Affect: Mood normal.            Diagnoses and health risks identified today and associated recommendations/orders:    1. Encounter for preventive health examination  Here for Health Risk Assessment/Annual Wellness Visit.  Health maintenance reviewed and updated. Follow up in one year.     2. Primary hypertension  Chronic, at goal on current medications. Followed by PCP, Cardiology.    3. Acute on chronic heart failure with preserved ejection " fraction (HFpEF)  Chronic, stable on current medications. Reports GOMEZ. Followed by PCP, Cardiology.    4. Chronic pulmonary heart disease, unspecified  Chronic, stable on current medications. Followed by PCP, Cardiology.    5. Chronic right-sided heart failure  Chronic, stable on current medications. Followed by PCP, Cardiology.    6. Paroxysmal atrial fibrillation  Chronic, stable on current medications/warfarin. Followed by PCP, Cardiology.    7. PFO (patent foramen ovale)  Chronic, stable. +GOMEZ. Followed by PCP, Cardiology.    8. Chronic respiratory failure with hypoxia and hypercapnia  Chronic, stable on current medications/oxygen. Followed by PCP.    9. Oxygen dependent  Followed by PCP, Cardiology.    10. Chronic asthmatic bronchitis  Chronic, stable on current medications. Followed by PCP, Cardiology.    11. Stage 3b chronic kidney disease  Chronic, stable on current medications. Followed by PCP, Nephrology.    12. Hyperparathyroidism, secondary  Chronic, stable on current medications. Followed by PCP, Nephrology.    13. Class 2 severe obesity due to excess calories with serious comorbidity and body mass index (BMI) of 37.0 to 37.9 in adult  Chronic, stable. Followed by PCP.    14. Idiopathic gout, unspecified chronicity, unspecified site  Chronic, stable on current medication. No recent gout exacerbations. Followed by PCP.     15. MALLIKA (obstructive sleep apnea)  Chronic, stable with CPAP. Followed by PCP, Sleep Medicine.    16. Dependence on supplemental oxygen  Followed by PCP, Cardiology.    17. Encounter for Medicare annual wellness exam  - Ambulatory Referral/Consult to Enhanced Annual Wellness Visit (eAWV)      Provided Yong with a 5-10 year written screening schedule and personal prevention plan. Recommendations were developed using the USPSTF age appropriate recommendations. Education, counseling, and referrals were provided as needed.  After Visit Summary printed and given to patient which includes a  list of additional screenings\tests needed.    No follow-ups on file.      Vicky Main, NP

## 2024-05-29 LAB — INR PPP: 3.3

## 2024-05-30 ENCOUNTER — ANTI-COAG VISIT (OUTPATIENT)
Dept: CARDIOLOGY | Facility: CLINIC | Age: 49
End: 2024-05-30
Payer: MEDICARE

## 2024-05-30 DIAGNOSIS — I27.9 CHRONIC PULMONARY HEART DISEASE: Primary | ICD-10-CM

## 2024-05-30 PROCEDURE — 93793 ANTICOAG MGMT PT WARFARIN: CPT | Mod: S$GLB,,, | Performed by: PHARMACIST

## 2024-06-03 ENCOUNTER — HOSPITAL ENCOUNTER (OUTPATIENT)
Dept: RADIOLOGY | Facility: HOSPITAL | Age: 49
Discharge: HOME | End: 2024-06-03
Attending: INTERNAL MEDICINE
Payer: MEDICARE

## 2024-06-03 ENCOUNTER — TELEPHONE (OUTPATIENT)
Dept: GASTROENTEROLOGY | Facility: CLINIC | Age: 49
End: 2024-06-03
Payer: MEDICARE

## 2024-06-03 DIAGNOSIS — N18.32 STAGE 3B CHRONIC KIDNEY DISEASE: Chronic | ICD-10-CM

## 2024-06-03 PROCEDURE — 76770 US EXAM ABDO BACK WALL COMP: CPT | Mod: 26,,, | Performed by: RADIOLOGY

## 2024-06-03 PROCEDURE — 76770 US EXAM ABDO BACK WALL COMP: CPT | Mod: TC

## 2024-06-04 ENCOUNTER — OFFICE VISIT (OUTPATIENT)
Dept: GASTROENTEROLOGY | Facility: CLINIC | Age: 49
End: 2024-06-04
Payer: MEDICARE

## 2024-06-04 VITALS
HEIGHT: 65 IN | BODY MASS INDEX: 37.54 KG/M2 | SYSTOLIC BLOOD PRESSURE: 95 MMHG | HEART RATE: 119 BPM | DIASTOLIC BLOOD PRESSURE: 63 MMHG | WEIGHT: 225.31 LBS

## 2024-06-04 DIAGNOSIS — Z86.79 HISTORY OF CHF (CONGESTIVE HEART FAILURE): ICD-10-CM

## 2024-06-04 DIAGNOSIS — R68.81 EARLY SATIETY: ICD-10-CM

## 2024-06-04 DIAGNOSIS — K59.09 CHRONIC CONSTIPATION: ICD-10-CM

## 2024-06-04 DIAGNOSIS — R12 HEARTBURN: ICD-10-CM

## 2024-06-04 DIAGNOSIS — K21.9 GASTROESOPHAGEAL REFLUX DISEASE, UNSPECIFIED WHETHER ESOPHAGITIS PRESENT: ICD-10-CM

## 2024-06-04 DIAGNOSIS — R14.2 BELCHING: ICD-10-CM

## 2024-06-04 DIAGNOSIS — R11.0 NAUSEA: ICD-10-CM

## 2024-06-04 DIAGNOSIS — R10.13 EPIGASTRIC PAIN: Primary | ICD-10-CM

## 2024-06-04 DIAGNOSIS — Z12.11 SCREENING FOR COLON CANCER: ICD-10-CM

## 2024-06-04 PROCEDURE — 1160F RVW MEDS BY RX/DR IN RCRD: CPT | Mod: CPTII,S$GLB,,

## 2024-06-04 PROCEDURE — 99999 PR PBB SHADOW E&M-EST. PATIENT-LVL V: CPT | Mod: PBBFAC,,,

## 2024-06-04 PROCEDURE — 3074F SYST BP LT 130 MM HG: CPT | Mod: CPTII,S$GLB,,

## 2024-06-04 PROCEDURE — 3078F DIAST BP <80 MM HG: CPT | Mod: CPTII,S$GLB,,

## 2024-06-04 PROCEDURE — 1159F MED LIST DOCD IN RCRD: CPT | Mod: CPTII,S$GLB,,

## 2024-06-04 PROCEDURE — 3008F BODY MASS INDEX DOCD: CPT | Mod: CPTII,S$GLB,,

## 2024-06-04 PROCEDURE — 3066F NEPHROPATHY DOC TX: CPT | Mod: CPTII,S$GLB,,

## 2024-06-04 PROCEDURE — 3044F HG A1C LEVEL LT 7.0%: CPT | Mod: CPTII,S$GLB,,

## 2024-06-04 PROCEDURE — 99214 OFFICE O/P EST MOD 30 MIN: CPT | Mod: S$GLB,,,

## 2024-06-04 RX ORDER — SUCRALFATE 1 G/10ML
1 SUSPENSION ORAL 4 TIMES DAILY
Qty: 400 ML | Refills: 0 | Status: SHIPPED | OUTPATIENT
Start: 2024-06-04 | End: 2024-06-14

## 2024-06-04 NOTE — PROGRESS NOTES
Subjective:       Patient ID: Yong Mcintyre is a 48 y.o. male Body mass index is 37.49 kg/m².    Chief Complaint: Gas    This patient is new to me.  Referring Provider: Aaareferral Self for GERD, abdominal pain.             Gastroesophageal Reflux  He complains of abdominal pain (reports epigastric pain started this year, pain worsened by having fluid in chest hx of CHF, eating and drinking, pain described as bloated/gassy, occurs daily, herbal teas & milk of magnesia helps pepto does not help rates pain 3/10), belching (and excess gas), early satiety, heartburn and nausea. He reports no chest pain, no choking, no coughing, no dysphagia, no globus sensation, no hoarse voice or no water brash. This is a new problem. The problem has been waxing and waning. The heartburn is located in the substernum. The heartburn wakes him from sleep. The heartburn does not limit his activity. The heartburn doesn't change with position. The symptoms are aggravated by certain foods. Pertinent negatives include no anemia, fatigue, melena, muscle weakness, orthopnea or weight loss. Hx of pulmonary HTN (Group 2-3, on 3L home O2, diagnosed prior to 2015, follows with Dr. Child at [facility]), MALLIKA, Obesity hypoventilation syndrome, HFpEF, Paroxysmal AFib (on Coumadin), BMI > 35, HTN, and GERD, CKD  Denies hematochezia, reports Chronic constipation, states he has on a fluid restriction, strains, does not feel empty, Has a bm daily, 2-3 per day day in small amount, rates stool type 5 does not take anything for his bowels, patient has not had colonoscopy in the past, reports he had an EGD about 2-3 years ago, denies any family history of colon cancer, patient is status post cholecystectomy had recent CT of abdomen that shows a moderate amount of stool burden  . There are no known risk factors. He has tried a PPI (Was recently prescribed pantoprazole 40 mg orally daily) for the symptoms. The treatment provided mild relief. Past procedures do  not include an abdominal ultrasound, an EGD, esophageal manometry, esophageal pH monitoring, H. pylori antibody titer or a UGI. Past invasive treatments do not include gastroplasty, gastroplication or reflux surgery.       Review of Systems   Constitutional:  Negative for fatigue and weight loss.   HENT:  Negative for hoarse voice.    Respiratory:  Negative for cough and choking.    Cardiovascular:  Negative for chest pain.   Gastrointestinal:  Positive for abdominal pain (reports epigastric pain started this year, pain worsened by having fluid in chest hx of CHF, eating and drinking, pain described as bloated/gassy, occurs daily, herbal teas & milk of magnesia helps pepto does not help rates pain 3/10), constipation, heartburn and nausea. Negative for abdominal distention, anal bleeding, blood in stool, diarrhea, dysphagia, melena, rectal pain and vomiting.   Musculoskeletal:  Negative for muscle weakness.         No LMP for male patient.  Past Medical History:   Diagnosis Date    Arthritis     CHF (congestive heart failure)     Diastolic dysfunction    Cor pulmonale 11/05/2012    Gallstones     GERD (gastroesophageal reflux disease)     Hypertension     Morbid obesity 11/05/2012    Obesity hypoventilation syndrome     On home oxygen therapy 03/07/2014    MALLIKA (obstructive sleep apnea)     BPAP 16/11 with 3 L at night (continuous O2).    Paroxysmal atrial fibrillation     PFO (patent foramen ovale) 11/05/2012    status post closure    Pickwickian syndrome 11/05/2012    Pulmonary hypertension      Past Surgical History:   Procedure Laterality Date    CHOLECYSTECTOMY      RIGHT HEART CATHETERIZATION Right 03/22/2022    Procedure: INSERTION, CATHETER, RIGHT HEART;  Surgeon: Tony Martínez MD;  Location: [facility] CATH LAB;  Service: Cardiology;  Laterality: Right;    RIGHT HEART CATHETERIZATION Right 01/31/2024    Procedure: INSERTION, CATHETER, RIGHT HEART;  Surgeon: Sheri Ritter MD;  Location: [facility] CATH LAB;  Service:  Cardiology;  Laterality: Right;    UPPER GASTROINTESTINAL ENDOSCOPY      2-3 years ago     Family History   Problem Relation Name Age of Onset    Arthritis Mother      Asthma Mother      Hypertension Father      Asthma Sister      Down syndrome Brother      Gout Brother      Arthritis Maternal Grandmother      Asthma Maternal Grandmother      Diabetes Maternal Grandmother      Hypertension Maternal Grandmother      Arthritis Maternal Grandfather      Hypertension Maternal Grandfather      Breast cancer Paternal Grandmother      Hypertension Paternal Grandfather      Colon cancer Neg Hx       Social History     Tobacco Use    Smoking status: Never    Smokeless tobacco: Never   Substance Use Topics    Alcohol use: Yes     Comment: holidays  rare    Drug use: No     Wt Readings from Last 10 Encounters:   06/04/24 102.2 kg (225 lb 5 oz)   05/27/24 103.4 kg (227 lb 15.3 oz)   05/08/24 104.7 kg (230 lb 13.2 oz)   05/01/24 103.1 kg (227 lb 4.7 oz)   04/26/24 101.7 kg (224 lb 3.3 oz)   04/08/24 103.2 kg (227 lb 8.2 oz)   03/21/24 102.5 kg (225 lb 15.5 oz)   02/12/24 107.8 kg (237 lb 10.5 oz)   02/09/24 106.4 kg (234 lb 7.4 oz)   02/02/24 104.6 kg (230 lb 9.6 oz)     Lab Results   Component Value Date    WBC 7.93 05/07/2024    HGB 16.6 05/07/2024    HCT 57.6 (H) 05/07/2024    MCV 90 05/07/2024     05/07/2024     CMP  Sodium   Date Value Ref Range Status   05/07/2024 137 136 - 145 mmol/L Final     Potassium   Date Value Ref Range Status   05/07/2024 3.2 (L) 3.5 - 5.1 mmol/L Final     Chloride   Date Value Ref Range Status   05/07/2024 85 (L) 95 - 110 mmol/L Final     CO2   Date Value Ref Range Status   05/07/2024 42 (HH) 23 - 29 mmol/L Final     Comment:     *Critical value notification by [facility] with confirmation of receipt to   DR. GREEN at Date 5/7/24 Time 8:59PM       Glucose   Date Value Ref Range Status   05/07/2024 126 (H) 70 - 110 mg/dL Final     BUN   Date Value Ref Range Status   05/07/2024 96 (H) 6 - 20 mg/dL  "Final     Creatinine   Date Value Ref Range Status   05/07/2024 2.5 (H) 0.5 - 1.4 mg/dL Final     Calcium   Date Value Ref Range Status   05/07/2024 9.7 8.7 - 10.5 mg/dL Final     Total Protein   Date Value Ref Range Status   04/22/2024 8.1 6.0 - 8.4 g/dL Final     Albumin   Date Value Ref Range Status   05/07/2024 3.0 (L) 3.5 - 5.2 g/dL Final     Total Bilirubin   Date Value Ref Range Status   04/22/2024 0.9 0.1 - 1.0 mg/dL Final     Comment:     For infants and newborns, interpretation of results should be based  on gestational age, weight and in agreement with clinical  observations.    Premature Infant recommended reference ranges:  Up to 24 hours.............<8.0 mg/dL  Up to 48 hours............<12.0 mg/dL  3-5 days..................<15.0 mg/dL  6-29 days.................<15.0 mg/dL       Alkaline Phosphatase   Date Value Ref Range Status   04/22/2024 155 (H) 55 - 135 U/L Final     AST   Date Value Ref Range Status   04/22/2024 39 10 - 40 U/L Final     ALT   Date Value Ref Range Status   04/22/2024 28 10 - 44 U/L Final     Anion Gap   Date Value Ref Range Status   05/07/2024 10 8 - 16 mmol/L Final     eGFR if    Date Value Ref Range Status   06/13/2022 >60.0 >60 mL/min/1.73 m^2 Final     eGFR    Date Value Ref Range Status   12/14/2022 54 (L) >=90 mL/min Final     Comment:     Calculation based on the Chronic Kidney Disease Epidemiology Collaboration (CKD-EPI) equation refit without adjustment for race.     eGFR if non    Date Value Ref Range Status   06/13/2022 >60.0 >60 mL/min/1.73 m^2 Final     Comment:     Calculation used to obtain the estimated glomerular filtration  rate (eGFR) is the CKD-EPI equation.        Lab Results   Component Value Date    LIPASE 13 12/11/2023     No results found for: "LIPASERES"  Lab Results   Component Value Date    TSH 3.208 04/22/2024       Reviewed prior medical records including radiology report of CT abdomen pelvis " 03/15/2024 & endoscopy history (see surgical history/procedures).    Objective:      Physical Exam  Vitals and nursing note reviewed.   Constitutional:       Appearance: Normal appearance. He is normal weight.   Cardiovascular:      Rate and Rhythm: Normal rate and regular rhythm.      Heart sounds: Normal heart sounds.   Pulmonary:      Breath sounds: Normal breath sounds.   Abdominal:      General: Bowel sounds are normal.      Palpations: Abdomen is soft.      Tenderness: There is no abdominal tenderness.   Skin:     General: Skin is warm and dry.      Coloration: Skin is not jaundiced.   Neurological:      Mental Status: He is alert and oriented to person, place, and time.   Psychiatric:         Mood and Affect: Mood normal.         Behavior: Behavior normal.         Assessment:       1. Epigastric pain    2. Gastroesophageal reflux disease, unspecified whether esophagitis present    3. Belching    4. Heartburn    5. Nausea    6. Early satiety    7. Chronic constipation    8. Screening for colon cancer        Plan:       Epigastric pain  -  START   sucralfate (CARAFATE) 100 mg/mL suspension; Take 10 mLs (1 g total) by mouth 4 (four) times daily. for 10 days  Dispense: 400 mL; Refill: 0  -     Lipase; Future; Expected date: 06/04/2024  -     Case Request Endoscopy: EGD (ESOPHAGOGASTRODUODENOSCOPY)  - schedule EGD, discussed procedure with patient, including risks and benefits, patient verbalized understanding  -Follow GERD lifestyle modifications  -continue pantoprazole 40 mg orally daily    Gastroesophageal reflux disease, unspecified whether esophagitis present, belching, heartburn  -     Case Request Endoscopy: EGD (ESOPHAGOGASTRODUODENOSCOPY)   -continue pantoprazole 40 mg orally daily   -if pantoprazole is not effective we will switch to another PPI    Nausea, Early satiety   - schedule EGD, discussed procedure with patient, including risks and benefits, patient verbalized understanding   -start on  Carafate   -manage reflux   -consider Zofran   -consider GES study    Chronic constipation   -Recommend daily exercise as tolerated, adequate water intake (six 8-oz glasses of water daily), and high fiber diet. OTC fiber supplements are recommended if diet does not reach daily fiber goal (20-30 grams daily), such as Metamucil, Citrucel, or FiberCon (take as directed, separate from other oral medications by >2 hours).  -Recommend trying OTC MiraLax once daily (17g PO) as directed  -If no improvement with above recommendations, try intermittently dosed Dulcolax OTC as directed (every 3-4 days) PRN to facilitate bowel movements  -If no relief with this, consider adding a emollient laxative (castor oil or mineral oil) +/- enema  -If still no improvement with these measures, call/follow-up    Screening for colon cancer  -     Case Request Endoscopy: COLONOSCOPY   - schedule colonoscopy, discussed procedure with patient, including risks and benefits, patient verbalized understanding    History of CHF (congestive heart failure)   -continue follow up with Cardiology    Follow up if symptoms worsen or fail to improve.      If no improvement in symptoms or symptoms worsen, call/follow-up at clinic or go to ER.       [facility] - GASTROENTEROLOGY  [facility] [place]     Dictation software program was used for this note. Please expect some simple typographical  errors in this note.    Encounter includes face to face time and non-face to face time preparing to see the patient (eg, review of tests), obtaining and/or reviewing separately obtained history, documenting clinical information in the electronic or other health record, independently interpreting results (not separately reported) and communicating results to the patient/family/caregiver, or care coordination (not separately reported).

## 2024-06-04 NOTE — PATIENT INSTRUCTIONS
..  Instructions for Outpatient Endoscopy    PROCEDURE DATE:   REPORT TO [facility] ([address] [place] [address] [place] [address]) ONE HOUR BEFORE PROCEDURE      NO BLOOD THINNERS/NO ASPIRIN OR MEDICATIONS CONTAINING ASPIRIN, MOTRIN, IBUPROFEN, ALEVE OR ANY ANTI-INFLAMMATORY MEDICATIONS FOR 7 DAYS PRIOR TO PROCEDURE. TYLENOL IS OK.      Eat a light evening meal the night before your Endoscopy          (Soup, Salad, [place], or grilled chicken)     Nothing by mouth after midnight or the morning of EGD.                 Ok to take morning medications with small sip water by 5:30am (except no Diabetic medications)              SINCE SEDATION IS USED, YOU MUST HAVE SOMEONE TO DRIVE YOU HOME/NO TAXI           ..MIRALAX PREP FOR COLONOSCOPY(OVER THE COUNTER PREP)    PROCEDURE DATE:   REPORT TO [facility] ([address] [place] [address] Drive) ONE HOUR BEFORE PROCEDURE.    NO BLOOD THINNERS/NO ASPIRIN (OK TO TAKE 81mg ASPIRIN) OR MEDICATIONS CONTAINING ASPIRIN, MOTRIN, IBUPROFEN, ALEVE, OR ANY ANTI-INFLAMMATORY MEDICATIONS FOR 7 DAYS PRIOR TO PROCEDURE. TYLENOL IS OK.    Avoid eating beans, peas, corn, nuts, popcorn, okra, and tomatoes for 5 days prior to your colonoscopy    **PURCHASE One 64 oz Bottle of Lemon-Lime Gatorade, one 8.3 oz bottle of MiraLAX (generic is acceptable), One box of Dulcolax Laxatives**    MIX MIRALAX PREP WITH GATORADE ON DAY BEFORE AND REFRIGERATE                          THE ENTIRE DAY BEFORE YOUR COLONOSCOPY CLEAR LIQUIDS ONLY (LISTED BELOW)                                                     NO FOOD   ___________________________________________________   Coffee (no cream), tea, carbonated beverages, gelatin-plain or fruit flavored, Gatorade, Crystal Light, Popsicles, snowballs, hard candy (avoid anything red, purple or blue). Juices - apple, white grape, or cranberry juice, lemonade, limeade, clear beef or chicken bouillon or broth. Avoid liquids not  listed, Alcohol is not permitted.  Take 2 Dulcolax laxative tablets at 10a  Take 2 more Dulcolax laxative tablets at 12p  At 5p drink 32oz of MiraLAX prep mix   Follow with 5(8oz) fluids of clear liquid over next 5 hours  At 10p drink other 32oz of MiraLAX prep mix  Follow with 3(8oz) fluids of clear liquid within 3 hours     NOTHING BY MOUTH AFTER 4am THE MORNING OF YOUR COLONOSCOPY     Ok to take morning medication by 4am (except no Diabetic medications)    Since sedation is used, you need someone to drive you home, No TAXI   Any Questions, please call Lorna or Joseph [phone]

## 2024-06-05 NOTE — PROGRESS NOTES
"  We received the following message:  "pts insurance will not authorize anymore visits.  Pt is being d/c from home health today.  Pt was offered to have services rendered today but he refused and stated he would go in the clinic to have INR done. "    "

## 2024-06-06 ENCOUNTER — LAB VISIT (OUTPATIENT)
Dept: LAB | Facility: HOSPITAL | Age: 49
End: 2024-06-06
Attending: INTERNAL MEDICINE
Payer: MEDICARE

## 2024-06-06 ENCOUNTER — OFFICE VISIT (OUTPATIENT)
Dept: TRANSPLANT | Facility: CLINIC | Age: 49
End: 2024-06-06
Payer: MEDICARE

## 2024-06-06 VITALS
DIASTOLIC BLOOD PRESSURE: 58 MMHG | SYSTOLIC BLOOD PRESSURE: 112 MMHG | HEART RATE: 107 BPM | OXYGEN SATURATION: 85 % | HEIGHT: 65 IN | BODY MASS INDEX: 37.76 KG/M2 | WEIGHT: 226.63 LBS

## 2024-06-06 DIAGNOSIS — Z79.01 LONG TERM CURRENT USE OF ANTICOAGULANT THERAPY: ICD-10-CM

## 2024-06-06 DIAGNOSIS — Z99.81 OXYGEN DEPENDENT: ICD-10-CM

## 2024-06-06 DIAGNOSIS — J96.11 CHRONIC RESPIRATORY FAILURE WITH HYPOXIA AND HYPERCAPNIA: Chronic | ICD-10-CM

## 2024-06-06 DIAGNOSIS — E66.9 OBESITY (BMI 30-39.9): ICD-10-CM

## 2024-06-06 DIAGNOSIS — I50.9 CONGESTIVE HEART FAILURE, UNSPECIFIED HF CHRONICITY, UNSPECIFIED HEART FAILURE TYPE: ICD-10-CM

## 2024-06-06 DIAGNOSIS — I48.0 PAROXYSMAL ATRIAL FIBRILLATION: Chronic | ICD-10-CM

## 2024-06-06 DIAGNOSIS — I50.32 CHRONIC DIASTOLIC HEART FAILURE: Primary | ICD-10-CM

## 2024-06-06 DIAGNOSIS — J96.12 CHRONIC RESPIRATORY FAILURE WITH HYPOXIA AND HYPERCAPNIA: Chronic | ICD-10-CM

## 2024-06-06 DIAGNOSIS — I51.9 RIGHT VENTRICULAR DYSFUNCTION: ICD-10-CM

## 2024-06-06 DIAGNOSIS — G47.33 OSA (OBSTRUCTIVE SLEEP APNEA): Chronic | ICD-10-CM

## 2024-06-06 DIAGNOSIS — I27.9 CHRONIC PULMONARY HEART DISEASE: ICD-10-CM

## 2024-06-06 LAB
ALBUMIN SERPL BCP-MCNC: 3.2 G/DL (ref 3.5–5.2)
ALP SERPL-CCNC: 128 U/L (ref 55–135)
ALT SERPL W/O P-5'-P-CCNC: 16 U/L (ref 10–44)
ANION GAP SERPL CALC-SCNC: 12 MMOL/L (ref 8–16)
AST SERPL-CCNC: 25 U/L (ref 10–40)
BILIRUB SERPL-MCNC: 1.2 MG/DL (ref 0.1–1)
BNP SERPL-MCNC: 642 PG/ML (ref 0–99)
BUN SERPL-MCNC: 111 MG/DL (ref 6–20)
CALCIUM SERPL-MCNC: 9.9 MG/DL (ref 8.7–10.5)
CHLORIDE SERPL-SCNC: 83 MMOL/L (ref 95–110)
CO2 SERPL-SCNC: 37 MMOL/L (ref 23–29)
CREAT SERPL-MCNC: 2.7 MG/DL (ref 0.5–1.4)
EST. GFR  (NO RACE VARIABLE): 28.2 ML/MIN/1.73 M^2
GLUCOSE SERPL-MCNC: 98 MG/DL (ref 70–110)
INR PPP: 3.3 (ref 0.8–1.2)
MAGNESIUM SERPL-MCNC: 1.6 MG/DL (ref 1.6–2.6)
POTASSIUM SERPL-SCNC: 3.3 MMOL/L (ref 3.5–5.1)
PROT SERPL-MCNC: 8.6 G/DL (ref 6–8.4)
PROTHROMBIN TIME: 33.4 SEC (ref 9–12.5)
SODIUM SERPL-SCNC: 132 MMOL/L (ref 136–145)
T4 FREE SERPL-MCNC: 1.26 NG/DL (ref 0.71–1.51)
TSH SERPL DL<=0.005 MIU/L-ACNC: 5.04 UIU/ML (ref 0.4–4)

## 2024-06-06 PROCEDURE — 84439 ASSAY OF FREE THYROXINE: CPT | Performed by: INTERNAL MEDICINE

## 2024-06-06 PROCEDURE — 84443 ASSAY THYROID STIM HORMONE: CPT | Performed by: INTERNAL MEDICINE

## 2024-06-06 PROCEDURE — 3074F SYST BP LT 130 MM HG: CPT | Mod: CPTII,S$GLB,, | Performed by: INTERNAL MEDICINE

## 2024-06-06 PROCEDURE — 85610 PROTHROMBIN TIME: CPT | Performed by: INTERNAL MEDICINE

## 2024-06-06 PROCEDURE — 83735 ASSAY OF MAGNESIUM: CPT | Performed by: INTERNAL MEDICINE

## 2024-06-06 PROCEDURE — 99215 OFFICE O/P EST HI 40 MIN: CPT | Mod: S$GLB,,, | Performed by: INTERNAL MEDICINE

## 2024-06-06 PROCEDURE — 3044F HG A1C LEVEL LT 7.0%: CPT | Mod: CPTII,S$GLB,, | Performed by: INTERNAL MEDICINE

## 2024-06-06 PROCEDURE — 3008F BODY MASS INDEX DOCD: CPT | Mod: CPTII,S$GLB,, | Performed by: INTERNAL MEDICINE

## 2024-06-06 PROCEDURE — 99999 PR PBB SHADOW E&M-EST. PATIENT-LVL V: CPT | Mod: PBBFAC,,, | Performed by: INTERNAL MEDICINE

## 2024-06-06 PROCEDURE — 3066F NEPHROPATHY DOC TX: CPT | Mod: CPTII,S$GLB,, | Performed by: INTERNAL MEDICINE

## 2024-06-06 PROCEDURE — 83880 ASSAY OF NATRIURETIC PEPTIDE: CPT | Performed by: INTERNAL MEDICINE

## 2024-06-06 PROCEDURE — 3078F DIAST BP <80 MM HG: CPT | Mod: CPTII,S$GLB,, | Performed by: INTERNAL MEDICINE

## 2024-06-06 PROCEDURE — 80053 COMPREHEN METABOLIC PANEL: CPT | Performed by: INTERNAL MEDICINE

## 2024-06-06 PROCEDURE — 1159F MED LIST DOCD IN RCRD: CPT | Mod: CPTII,S$GLB,, | Performed by: INTERNAL MEDICINE

## 2024-06-06 PROCEDURE — 83880 ASSAY OF NATRIURETIC PEPTIDE: CPT | Mod: 91 | Performed by: INTERNAL MEDICINE

## 2024-06-06 NOTE — PROGRESS NOTES
Subjective:       HPI:  Yong Mcintyre is a 48 y.o. male with severe pulmonary HTN (Group 2-3, on 3L home O2, diagnosed prior to 2015, follows with Dr. Child at [facility]), MALLIKA, Obesity hypoventilation syndrome, HFpEF, Paroxysmal AFib (on Coumadin), BMI > 35, HTN, who comes for a post-hospital discharge follow-up visit. This is his 1st visit with me. As stated, he was recently admitted for ADHF. This was his 4 th admission thsi year. His diuretic regimen was adjusted to the following; Torsemide 60 mg twice daily and metolazone 2.5 on M/WF/Sunday.  Clinically reports NYHA class III symptoms. States that he has lost some weight since hospital discharge. Otherwise has no new complaints. He continues to use 3L of O2 at hoem and 4 L when goes out.       2D Echo with CFD done on 3/17/2024  Left Ventricle: The left ventricle is normal in size. Septal flattening in diastole consistent with right ventricular volume overload. There is normal systolic function. Ejection fraction by visual approximation is 60%.    Right Ventricle: Severe right ventricular enlargement. Systolic function is severely reduced. See text for details.    Right Atrium: Right atrium is severely dilated.    Tricuspid Valve: There is moderate to severe regurgitation.    Pulmonary Artery: There is severe pulmonary hypertension. The estimated pulmonary artery systolic pressure is 88 mmHg.    IVC/SVC: Elevated venous pressure at 15 mmHg.    RHC done on 1/31/2024  RA: 18/ 15/ 14 RV: 84/ 5/ 15 PA: 80/ 38/ 50 PWP: 19/ 18/ 15 .   Cardiac output was 3.74  by Atif. Cardiac index is 1.78 L/min/m2.       Thermodilution CO/CI 6.14/2.92 at rest  Likely reduced CO/CI based on Atif and in setting of moderate to severe tricuspid regurgitation.   Moderately elevated right and mildly elevated left sided filling pressure.   Severe pulmonary hypertension in setting of normal to mildly elevated left sided filling pressure at rest.   TPG  35   PVR  10    SVR 1610 at rest  based on Atif calculation   MAP 81    With exercise PA pressures increased to 92/50, mean 60  PCWP increased to 25/26, 24.   Blood pressure 130/87, mean 100.        Past Medical History:   Diagnosis Date    Arthritis     CHF (congestive heart failure)     Diastolic dysfunction    Cor pulmonale 11/05/2012    Gallstones     GERD (gastroesophageal reflux disease)     Hypertension     Morbid obesity 11/05/2012    Obesity hypoventilation syndrome     On home oxygen therapy 03/07/2014    MALLIKA (obstructive sleep apnea)     BPAP 16/11 with 3 L at night (continuous O2).    Paroxysmal atrial fibrillation     PFO (patent foramen ovale) 11/05/2012    status post closure    Pickwickian syndrome 11/05/2012    Pulmonary hypertension      Past Surgical History:   Procedure Laterality Date    CHOLECYSTECTOMY      RIGHT HEART CATHETERIZATION Right 03/22/2022    Procedure: INSERTION, CATHETER, RIGHT HEART;  Surgeon: Tony Martínez MD;  Location: [facility] CATH LAB;  Service: Cardiology;  Laterality: Right;    RIGHT HEART CATHETERIZATION Right 01/31/2024    Procedure: INSERTION, CATHETER, RIGHT HEART;  Surgeon: Sheri Ritter MD;  Location: [facility] CATH LAB;  Service: Cardiology;  Laterality: Right;    UPPER GASTROINTESTINAL ENDOSCOPY      2-3 years ago       Review of Systems   Constitutional: Negative. Negative for chills, decreased appetite, diaphoresis, fever, malaise/fatigue, night sweats, weight gain and weight loss.   Eyes: Negative.    Cardiovascular:  Positive for dyspnea on exertion. Negative for chest pain, claudication, cyanosis, irregular heartbeat, leg swelling, near-syncope, orthopnea, palpitations, paroxysmal nocturnal dyspnea and syncope.   Respiratory:  Negative for cough, hemoptysis and shortness of breath.    Endocrine: Negative.    Hematologic/Lymphatic: Negative.    Skin:  Negative for color change, dry skin and nail changes.   Musculoskeletal: Negative.    Gastrointestinal: Negative.    Genitourinary: Negative.   "  Neurological:  Negative for weakness.       Objective:   There were no vitals taken for this visit.body mass index is unknown because there is no height or weight on file.  Physical Exam  Vitals reviewed.   Constitutional:       Appearance: He is well-developed.      Comments: BP (!) 112/58 (BP Location: Left arm, Patient Position: Sitting, BP Method: Medium (Automatic))   Pulse 107   Ht 5' 5" (1.651 m)   Wt 102.8 kg (226 lb 10.1 oz)   SpO2 (!) 85%   BMI 37.71 kg/m²      HENT:      Head: Normocephalic.   Neck:      Vascular: No carotid bruit or JVD.   Cardiovascular:      Rate and Rhythm: Regular rhythm.      Pulses: Normal pulses.      Heart sounds: Normal heart sounds. No murmur heard.     Comments: On home O2 (3L)  Pulmonary:      Effort: Pulmonary effort is normal.      Breath sounds: Normal breath sounds.   Abdominal:      General: Bowel sounds are normal.      Palpations: Abdomen is soft.   Skin:     General: Skin is warm.   Neurological:      Mental Status: He is alert.         Labs:    Chemistry        Component Value Date/Time     (L) 06/06/2024 1335    K 3.3 (L) 06/06/2024 1335    CL 83 (L) 06/06/2024 1335    CO2 37 (H) 06/06/2024 1335     (H) 06/06/2024 1335    CREATININE 2.7 (H) 06/06/2024 1335    GLU 98 06/06/2024 1335        Component Value Date/Time    CALCIUM 9.9 06/06/2024 1335    ALKPHOS 128 06/06/2024 1335    AST 25 06/06/2024 1335    ALT 16 06/06/2024 1335    BILITOT 1.2 (H) 06/06/2024 1335    ESTGFRAFRICA 54 (L) 12/14/2022 1500    ESTGFRAFRICA >60.0 06/13/2022 1316    EGFRNONAA >60.0 06/13/2022 1316          Magnesium   Date Value Ref Range Status   04/27/2024 1.6 1.6 - 2.6 mg/dL Final     Lab Results   Component Value Date    WBC 7.93 05/07/2024    HGB 16.6 05/07/2024    HCT 57.6 (H) 05/07/2024     05/07/2024     Lab Results   Component Value Date    INR 3.3 05/29/2024    INR 3.1 05/21/2024    INR 3.8 05/01/2024     BNP   Date Value Ref Range Status   04/22/2024 892 " (H) 0 - 99 pg/mL Final     Comment:     Values of less than 100 pg/ml are consistent with non-CHF populations.   04/08/2024 575 (H) 0 - 99 pg/mL Final     Comment:     Values of less than 100 pg/ml are consistent with non-CHF populations.   03/15/2024 889 (H) 0 - 99 pg/mL Final     Comment:     Values of less than 100 pg/ml are consistent with non-CHF populations.       Assessment:      1. Chronic diastolic heart failure    2. Chronic respiratory failure with hypoxia and hypercapnia    3. Right ventricular dysfunction    4. Paroxysmal atrial fibrillation    5. Obesity (BMI 30-39.9)    6. Oxygen dependent    7. MALLIKA (obstructive sleep apnea)        Plan:   NYHA class III symptoms with recent admission. Seems to be feeling better on this new diuretic regimen however I am worried about the frequency of use of metolazone and his worsening kidney function.    In  my opinion with his recurrent HF admissions and  most recent hemodynamics, his overall prognosis is poor. He does have an appointment with Dr. Hdez next week.    Consider palliative care consult   Recommend 2 gram sodium restriction and 1500cc fluid restriction.  Encourage physical activity with graded exercise program.  Requested patient to weigh themselves daily, and to notify us if their weight increases by more than 3 lbs in 1 day or 5 lbs in 1 week.     Advance Care Planning     Date: 06/06/2024  He has ACP docs in file.      Tony Martínez MD

## 2024-06-07 ENCOUNTER — ANTI-COAG VISIT (OUTPATIENT)
Dept: CARDIOLOGY | Facility: CLINIC | Age: 49
End: 2024-06-07
Payer: MEDICARE

## 2024-06-07 DIAGNOSIS — I27.9 CHRONIC PULMONARY HEART DISEASE: Primary | ICD-10-CM

## 2024-06-07 LAB — NT-PROBNP SERPL IA-MCNC: 7881 PG/ML

## 2024-06-07 PROCEDURE — 93793 ANTICOAG MGMT PT WARFARIN: CPT | Mod: S$GLB,,,

## 2024-06-07 NOTE — PROGRESS NOTES
[facility]    06/07/2024 3:19 PM    Assessment/Plan:    Patient presents today with supratherapeutic INR.    Assessment of patient findings and chart review: Scheduled for 7/25 EGD and 8/8 cscope, instructions to be provided as date approaches    Recommendation for patient's warfarin regimen: Hold dose today then resume current maintenance dose, pt likely needs to change to warfarin 5 mg tabs    Recommend repeat INR in 1 week  _________________________________________________________________    Yongtres Mcintyre (48 y.o.) is followed by the [facility] [employer] An[facility] Management [facility].    Anticoagulation Summary  As of 6/7/2024      INR goal:  2.0-3.0   TTR:  54.0% (11.3 y)   INR used for dosing:  3.3 (6/6/2024)   Warfarin maintenance plan:  0 mg every Thu; 5 mg (10 mg x 0.5) all other days   Weekly warfarin total:  30 mg   Plan last modified:  Pierce Bush, PharmD (5/30/2024)   Next INR check:  6/12/2024   Target end date:  Indefinite    Indications    Chronic pulmonary heart disease  unspecified [I27.9]                 Anticoagulation Episode Summary       INR check location:  [facility]     Preferred lab:  [facility]    Send INR reminders to:  [facility]    Comments:  3/22/24: STAT HH; STAT HH;  (Ph) [phone], (Fx) [phone]  [facility] [place]//Infusion Suite, every other Mon -- ph [phone]// <VENIPUNCTURE ONLY (POC/meter does not work for pt)>          Anticoagulation Care Providers       Provider Role Specialty Phone number    Gianni Escalona MD [facility] Internal Medicine [phone]

## 2024-06-10 ENCOUNTER — PATIENT MESSAGE (OUTPATIENT)
Dept: INTERNAL MEDICINE | Facility: CLINIC | Age: 49
End: 2024-06-10
Payer: MEDICARE

## 2024-06-13 ENCOUNTER — LAB VISIT (OUTPATIENT)
Dept: LAB | Facility: HOSPITAL | Age: 49
End: 2024-06-13
Attending: INTERNAL MEDICINE
Payer: MEDICARE

## 2024-06-13 ENCOUNTER — ANTI-COAG VISIT (OUTPATIENT)
Dept: CARDIOLOGY | Facility: CLINIC | Age: 49
End: 2024-06-13
Payer: MEDICARE

## 2024-06-13 DIAGNOSIS — I27.9 CHRONIC PULMONARY HEART DISEASE: ICD-10-CM

## 2024-06-13 DIAGNOSIS — Z79.01 LONG TERM CURRENT USE OF ANTICOAGULANT THERAPY: ICD-10-CM

## 2024-06-13 DIAGNOSIS — I27.9 CHRONIC PULMONARY HEART DISEASE: Primary | ICD-10-CM

## 2024-06-13 LAB
INR PPP: 2.8 (ref 0.8–1.2)
PROTHROMBIN TIME: 28.5 SEC (ref 9–12.5)

## 2024-06-13 PROCEDURE — 93793 ANTICOAG MGMT PT WARFARIN: CPT | Mod: S$GLB,,, | Performed by: PHARMACIST

## 2024-06-13 PROCEDURE — 36415 COLL VENOUS BLD VENIPUNCTURE: CPT | Performed by: INTERNAL MEDICINE

## 2024-06-13 PROCEDURE — 85610 PROTHROMBIN TIME: CPT | Performed by: INTERNAL MEDICINE

## 2024-06-13 NOTE — PROGRESS NOTES
[facility]    2024 3:44 PM    Assessment/Plan:    Patient presents today with therapeutic INR.    Assessment of patient findings and chart review: INR at goal. Medications and chart reviewed. No changes noted to necessitate adjustment of warfarin or follow-up plan. See calendar.      Recommendation for patient's warfarin regimen: Continue current maintenance dose    Recommend repeat INR in 1 week  _________________________________________________________________    Yong Mcintyre (48 y.o.) is followed by the [facility].    Anticoagulation Summary  As of 2024      INR goal:  2.0-3.0   TTR:  53.9% (11.3 y)   INR used for dosin.8 (2024)   Warfarin maintenance plan:  0 mg every Thu; 5 mg (10 mg x 0.5) all other days   Weekly warfarin total:  30 mg   Plan last modified:  Pierce Bush, PharmD (2024)   Next INR check:  2024   Target end date:  Indefinite    Indications    Chronic pulmonary heart disease  unspecified [I27.9]                 Anticoagulation Episode Summary       INR check location:  Home Health     Preferred lab:  [facility]    Send INR reminders to:  [facility]    Comments:  3/22/24: STAT HH; STAT HH;  (Ph) [phone], (Fx) [phone]  [facility]Infusion Suite, every other Mon -- ph [phone]// <VENIPUNCTURE ONLY (POC/meter does not work for pt)>          Anticoagulation Care Providers       Provider Role Specialty Phone number    Gianni Escalona MD [facility] Internal Medicine [phone]

## 2024-06-14 DIAGNOSIS — N18.32 STAGE 3B CHRONIC KIDNEY DISEASE: Primary | Chronic | ICD-10-CM

## 2024-06-19 ENCOUNTER — OFFICE VISIT (OUTPATIENT)
Dept: NEPHROLOGY | Facility: CLINIC | Age: 49
End: 2024-06-19
Payer: MEDICARE

## 2024-06-19 ENCOUNTER — LAB VISIT (OUTPATIENT)
Dept: LAB | Facility: HOSPITAL | Age: 49
End: 2024-06-19
Payer: MEDICARE

## 2024-06-19 ENCOUNTER — ANTI-COAG VISIT (OUTPATIENT)
Dept: CARDIOLOGY | Facility: CLINIC | Age: 49
End: 2024-06-19
Payer: MEDICARE

## 2024-06-19 VITALS
BODY MASS INDEX: 39.45 KG/M2 | RESPIRATION RATE: 18 BRPM | DIASTOLIC BLOOD PRESSURE: 52 MMHG | HEART RATE: 129 BPM | WEIGHT: 236.75 LBS | HEIGHT: 65 IN | SYSTOLIC BLOOD PRESSURE: 89 MMHG

## 2024-06-19 DIAGNOSIS — I51.9 RIGHT VENTRICULAR DYSFUNCTION: ICD-10-CM

## 2024-06-19 DIAGNOSIS — E87.6 HYPOKALEMIA: ICD-10-CM

## 2024-06-19 DIAGNOSIS — I27.9 CHRONIC PULMONARY HEART DISEASE: Primary | ICD-10-CM

## 2024-06-19 DIAGNOSIS — Z79.01 LONG TERM CURRENT USE OF ANTICOAGULANT THERAPY: ICD-10-CM

## 2024-06-19 DIAGNOSIS — N18.4 CHRONIC KIDNEY DISEASE (CKD), STAGE IV (SEVERE): ICD-10-CM

## 2024-06-19 DIAGNOSIS — R06.89 HYPOVENTILATION SYNDROME: ICD-10-CM

## 2024-06-19 DIAGNOSIS — I27.9 CHRONIC PULMONARY HEART DISEASE: ICD-10-CM

## 2024-06-19 DIAGNOSIS — I27.20 PULMONARY HYPERTENSION: Primary | Chronic | ICD-10-CM

## 2024-06-19 DIAGNOSIS — N18.32 STAGE 3B CHRONIC KIDNEY DISEASE: Chronic | ICD-10-CM

## 2024-06-19 LAB
ALBUMIN SERPL BCP-MCNC: 2.8 G/DL (ref 3.5–5.2)
ANION GAP SERPL CALC-SCNC: 15 MMOL/L (ref 8–16)
BASOPHILS # BLD AUTO: 0.04 K/UL (ref 0–0.2)
BASOPHILS NFR BLD: 0.5 % (ref 0–1.9)
BUN SERPL-MCNC: 107 MG/DL (ref 6–20)
CALCIUM SERPL-MCNC: 9.1 MG/DL (ref 8.7–10.5)
CHLORIDE SERPL-SCNC: 87 MMOL/L (ref 95–110)
CO2 SERPL-SCNC: 36 MMOL/L (ref 23–29)
CREAT SERPL-MCNC: 2.8 MG/DL (ref 0.5–1.4)
DIFFERENTIAL METHOD BLD: ABNORMAL
EOSINOPHIL # BLD AUTO: 0 K/UL (ref 0–0.5)
EOSINOPHIL NFR BLD: 0 % (ref 0–8)
ERYTHROCYTE [DISTWIDTH] IN BLOOD BY AUTOMATED COUNT: 21.2 % (ref 11.5–14.5)
EST. GFR  (NO RACE VARIABLE): 27 ML/MIN/1.73 M^2
GLUCOSE SERPL-MCNC: 111 MG/DL (ref 70–110)
HCT VFR BLD AUTO: 56.9 % (ref 40–54)
HGB BLD-MCNC: 15.8 G/DL (ref 14–18)
IMM GRANULOCYTES # BLD AUTO: 0.02 K/UL (ref 0–0.04)
IMM GRANULOCYTES NFR BLD AUTO: 0.3 % (ref 0–0.5)
INR PPP: 2.4 (ref 0.8–1.2)
LYMPHOCYTES # BLD AUTO: 0.9 K/UL (ref 1–4.8)
LYMPHOCYTES NFR BLD: 12 % (ref 18–48)
MCH RBC QN AUTO: 24 PG (ref 27–31)
MCHC RBC AUTO-ENTMCNC: 27.8 G/DL (ref 32–36)
MCV RBC AUTO: 87 FL (ref 82–98)
MONOCYTES # BLD AUTO: 0.7 K/UL (ref 0.3–1)
MONOCYTES NFR BLD: 9.4 % (ref 4–15)
NEUTROPHILS # BLD AUTO: 5.8 K/UL (ref 1.8–7.7)
NEUTROPHILS NFR BLD: 77.8 % (ref 38–73)
NRBC BLD-RTO: 0 /100 WBC
PHOSPHATE SERPL-MCNC: 3.7 MG/DL (ref 2.7–4.5)
PLATELET # BLD AUTO: 238 K/UL (ref 150–450)
PMV BLD AUTO: 10.1 FL (ref 9.2–12.9)
POTASSIUM SERPL-SCNC: 2.9 MMOL/L (ref 3.5–5.1)
PROTHROMBIN TIME: 25.3 SEC (ref 9–12.5)
RBC # BLD AUTO: 6.58 M/UL (ref 4.6–6.2)
SODIUM SERPL-SCNC: 138 MMOL/L (ref 136–145)
WBC # BLD AUTO: 7.47 K/UL (ref 3.9–12.7)

## 2024-06-19 PROCEDURE — 3078F DIAST BP <80 MM HG: CPT | Mod: CPTII,S$GLB,, | Performed by: INTERNAL MEDICINE

## 2024-06-19 PROCEDURE — 36415 COLL VENOUS BLD VENIPUNCTURE: CPT | Performed by: INTERNAL MEDICINE

## 2024-06-19 PROCEDURE — 3044F HG A1C LEVEL LT 7.0%: CPT | Mod: CPTII,S$GLB,, | Performed by: INTERNAL MEDICINE

## 2024-06-19 PROCEDURE — 85025 COMPLETE CBC W/AUTO DIFF WBC: CPT | Performed by: INTERNAL MEDICINE

## 2024-06-19 PROCEDURE — 3066F NEPHROPATHY DOC TX: CPT | Mod: CPTII,S$GLB,, | Performed by: INTERNAL MEDICINE

## 2024-06-19 PROCEDURE — 80069 RENAL FUNCTION PANEL: CPT | Performed by: INTERNAL MEDICINE

## 2024-06-19 PROCEDURE — 3074F SYST BP LT 130 MM HG: CPT | Mod: CPTII,S$GLB,, | Performed by: INTERNAL MEDICINE

## 2024-06-19 PROCEDURE — 85610 PROTHROMBIN TIME: CPT | Performed by: INTERNAL MEDICINE

## 2024-06-19 PROCEDURE — 3008F BODY MASS INDEX DOCD: CPT | Mod: CPTII,S$GLB,, | Performed by: INTERNAL MEDICINE

## 2024-06-19 PROCEDURE — 1160F RVW MEDS BY RX/DR IN RCRD: CPT | Mod: CPTII,S$GLB,, | Performed by: INTERNAL MEDICINE

## 2024-06-19 PROCEDURE — 99214 OFFICE O/P EST MOD 30 MIN: CPT | Mod: S$GLB,,, | Performed by: INTERNAL MEDICINE

## 2024-06-19 PROCEDURE — 99999 PR PBB SHADOW E&M-EST. PATIENT-LVL III: CPT | Mod: PBBFAC,,, | Performed by: INTERNAL MEDICINE

## 2024-06-19 PROCEDURE — 1159F MED LIST DOCD IN RCRD: CPT | Mod: CPTII,S$GLB,, | Performed by: INTERNAL MEDICINE

## 2024-06-19 NOTE — PROGRESS NOTES
Progress Report  Nephrology      Consult Requested By: PCP  Reason for Consult: CKD, proteinuria    History of Present Illness:  Patient is a 48 y.o. male presents for a follow up evaluation of chronic kidney disease.     The patient reports chronic SOB, LE edema, no hematuria nor foamy urine. Checking daily weight at home.     The patient denies taking NSAIDs or new antibiotics, recreational drugs, recent episode of dehydration, diarrhea, nausea or vomiting, acute illness, hospitalization or exposure to IV radiocontrast.     Past Medical History:   Diagnosis Date    Arthritis     CHF (congestive heart failure)     Diastolic dysfunction    Cor pulmonale 11/05/2012    Gallstones     GERD (gastroesophageal reflux disease)     Hypertension     Morbid obesity 11/05/2012    Obesity hypoventilation syndrome     On home oxygen therapy 03/07/2014    MALLIKA (obstructive sleep apnea)     BPAP 16/11 with 3 L at night (continuous O2).    Paroxysmal atrial fibrillation     PFO (patent foramen ovale) 11/05/2012    status post closure    Pickwickian syndrome 11/05/2012    Pulmonary hypertension          Current Outpatient Medications:     acetaminophen (TYLENOL ARTHRITIS ORAL), Take 2 tablets by mouth daily as needed (pain)., Disp: , Rfl:     albuterol (VENTOLIN HFA) 90 mcg/actuation inhaler, Inhale 2 puffs into the lungs every 4 (four) hours as needed for Wheezing. Rescue, Disp: 18 g, Rfl: 2    allopurinoL (ZYLOPRIM) 300 MG tablet, Take 1 tablet (300 mg total) by mouth once daily., Disp: 90 tablet, Rfl: 3    ascorbic acid (VITAMIN C ORAL), Take 1 tablet by mouth once daily., Disp: , Rfl:     b complex vitamins tablet, Take 1 tablet by mouth once daily., Disp: , Rfl:     CALCIUM ORAL, Take 1 capsule by mouth once daily., Disp: , Rfl:     metOLazone (ZAROXOLYN) 2.5 MG tablet, Take 1 tablet (2.5 mg total) by mouth every Mon, Wed, Fri, Sun. Take 2.5 mg 4 times a week. In addition, if you gain 3lbs in a day or 5 lbs in three days, take  "an extra dose of metolazone regardless of day of the week, Disp: 16 tablet, Rfl: 11    multivit,calc,min/FA/K1/lycop (ONE-A-DAY MEN'S COMPLETE ORAL), Take 1 tablet by mouth once daily., Disp: , Rfl:     pantoprazole (PROTONIX) 40 MG tablet, Take 1 tablet (40 mg total) by mouth once daily. (Patient taking differently: Take 40 mg by mouth every morning.), Disp: 90 tablet, Rfl: 3    potassium chloride (MICRO-K) 10 MEQ CpSR, Take 10 mEq by mouth 2 (two) times daily. Take 2 capsules by mouth twice daily., Disp: , Rfl:     tiotropium (SPIRIVA) 18 mcg inhalation capsule, Inhale 18 mcg into the lungs once daily., Disp: , Rfl:     torsemide (DEMADEX) 20 MG Tab, Take 3 tablets (60 mg total) by mouth 2 (two) times a day., Disp: 270 tablet, Rfl: 3    warfarin (COUMADIN) 10 MG tablet, Take 1/2 tablet (5 mg) by mouth daily or as directed by [facility]madin Clinic (Patient taking differently: Take 5 mg by mouth Daily. Take 1/2 Tablet by mouth daily Or as directed by the coumadin clinic.), Disp: 30 tablet, Rfl: 3    WIXELA INHUB 250-50 mcg/dose diskus inhaler, 1 puff 2 (two) times daily. USE 1 INHALATION BY MOUTH TWICE DAILY, Disp: , Rfl:   Review of patient's allergies indicates:   Allergen Reactions    Lipitor [atorvastatin] Other (See Comments)     "it makes my legs feel like jelly"  Other reaction(s): causes legs to hurt        Past Surgical History:   Procedure Laterality Date    CHOLECYSTECTOMY      RIGHT HEART CATHETERIZATION Right 03/22/2022    Procedure: INSERTION, CATHETER, RIGHT HEART;  Surgeon: Tony Martínez MD;  Location: [facility] CATH LAB;  Service: Cardiology;  Laterality: Right;    RIGHT HEART CATHETERIZATION Right 01/31/2024    Procedure: INSERTION, CATHETER, RIGHT HEART;  Surgeon: Sheri Ritter MD;  Location: [facility] CATH LAB;  Service: Cardiology;  Laterality: Right;    UPPER GASTROINTESTINAL ENDOSCOPY      2-3 years ago     Family History   Problem Relation Name Age of Onset    Arthritis Mother      Asthma Mother      " Hypertension Father      Asthma Sister      Down syndrome Brother      Gout Brother      Arthritis Maternal Grandmother      Asthma Maternal Grandmother      Diabetes Maternal Grandmother      Hypertension Maternal Grandmother      Arthritis Maternal Grandfather      Hypertension Maternal Grandfather      Breast cancer Paternal Grandmother      Hypertension Paternal Grandfather      Colon cancer Neg Hx       Social History     Tobacco Use    Smoking status: Never    Smokeless tobacco: Never   Substance Use Topics    Alcohol use: Yes     Comment: holidays  rare    Drug use: No       Review of Systems   Constitutional: Negative.    Respiratory:  Positive for shortness of breath.    Cardiovascular:  Positive for leg swelling.   Gastrointestinal: Negative.    Genitourinary: Negative.    Musculoskeletal: Negative.    Skin: Negative.    All other systems reviewed and are negative.      Vitals:    06/19/24 1450   BP: (!) 89/52   Pulse: (!) 129   Resp: 18   BP 87/60 mmHg    PHYSICAL EXAMINATION:  General: no distress, well nourished  Skin: color, texture, turgor normal. No rash or lesions  HEENT: Eyes: reactive pupils, normal conjunctiva. Oral mucosa moist, no ulcers. Throat: no erythema.  Neck: supple, symmetrical, trachea midline, no JVD, no carotid bruit  Lungs: clear to auscultation bilaterally and normal respiratory effort  Cardiovascular: Heart: regular rate and rhythm, S1, S2 normal, no murmur, rub or gallop. Pulses: 2+ and symmetric.  Abdomen: bowel sounds present, no abdominal bruit, soft, non-tender non-distented; no masses, organomegaly or ascites.   Musculoskeletal: 1+ pitting edema in lower extremities, no clubbing or cyanosis  Lymph Nodes: No cervical or supraclavicular adenopathy  Neurologic: AAOx3, normal strength and tone. No focal deficit. No asterixis.       LABORATORY DATA:  Lab Results   Component Value Date    CREATININE 2.8 (H) 06/19/2024       Prot/Creat Ratio, Urine   Date Value Ref Range Status    05/08/2024 0.24 (H) 0.00 - 0.20 Final   04/22/2024 2.54 (H) 0.00 - 0.20 Final       Lab Results   Component Value Date     06/19/2024    K 2.9 (L) 06/19/2024    CO2 36 (H) 06/19/2024       Lab Results   Component Value Date    .2 (H) 05/07/2024    CALCIUM 9.1 06/19/2024    PHOS 3.7 06/19/2024       Lab Results   Component Value Date    HGB 15.8 06/19/2024        Lab Results   Component Value Date    HGBA1C 5.8 (H) 01/19/2024       Lab Results   Component Value Date    LDLCALC 64 06/12/2024         IMAGING STUDIES  Kidney US: Reviewed  Echocardiogram: Reviewed    IMPRESSION / RECOMMENDATIONS:     1. Stage 3b chronic kidney disease  Ambulatory referral/consult to Nephrology. Impaired kidney function for the last 8 months. No significant proteinuria. No clear etiology, possible CRS type 2 from severe right ventricular dysfunction. No anemia of CKD. Relatively hypotensive and tachycardic, will continue torsemide but will reduce metolazone from QIW to BIW      2. Proteinuria, unspecified type  minimal      3. Pulmonary hypertension  Managed by PCP, Cardiology      4. Chronic right-sided heart failure  As above      5. Right ventricular dysfunction  As above      6. On Coumadin for atrial fibrillation  Managed by Cardiology      7. Chronic respiratory failure with hypoxia and hypercapnia  Chronic, along with intermittent post hypercapneic metabolic alkalosis. Asymptomatic      8. Edema, unspecified type  Stable, continue torsemide / metolazone but reducing intensity      9. Hypokalemia  On K supplementation. Will increase from 40 to 50 mEq KCl per day          SUMMARY OF PLAN:  Continue torsemide  Continue KCl  Avoid NSAIDs  Home BP and weight monitoring   Decrease metolazone from QIW to BIW  6.   Increase KCl from 40 to 50 mEq/d  7.   RTC in 3 mo

## 2024-06-19 NOTE — PROGRESS NOTES
[facility]    2024 2:08 PM    Assessment/Plan:    Patient presents today with therapeutic INR.    Assessment of patient findings and chart review: INR at goal. Medications and chart reviewed. No changes noted to necessitate adjustment of warfarin or follow-up plan. See calendar.      Recommendation for patient's warfarin regimen: Continue current maintenance dose    Recommend repeat INR in 2 weeks  _________________________________________________________________    Yong Mcintyre (48 y.o.) is followed by the [facility].    Anticoagulation Summary  As of 2024      INR goal:  2.0-3.0   TTR:  54.0% (11.3 y)   INR used for dosin.4 (2024)   Warfarin maintenance plan:  0 mg every Thu; 5 mg (10 mg x 0.5) all other days   Weekly warfarin total:  30 mg   Plan last modified:  Pierce Bush, PharmD (2024)   Next INR check:  2024   Target end date:  Indefinite    Indications    Chronic pulmonary heart disease  unspecified [I27.9]                 Anticoagulation Episode Summary       INR check location:  Home Health     Preferred lab:  [facility]    Send INR reminders to:  [facility]    Comments:  3/22/24: STAT HH; STAT HH;  (Ph) [phone], (Fx) [phone]  [facility] [place]//Infusion Suite, every other Mon -- ph [phone]// <VENIPUNCTURE ONLY (POC/meter does not work for pt)>          Anticoagulation Care Providers       Provider Role Specialty Phone number    Gianni Escalona MD [facility] Internal Medicine [phone]

## 2024-06-25 NOTE — PROGRESS NOTES
"  [facility] received the following message:    "Mr. Mcintyre is scheduled for an EGD on 7/25/24 and colonoscopy on 8/08/24 with Dr. Mary Townsend. Patient states he is taking Coumadin. We are requesting clearance for patient to hold Coumadin for 3 - 5 days prior to his scheduled procedures.   Thanks   Gracie Thompson LPN "    Patient may hold coumadin 3-5 days prior to his procedure(s).  We will discuss specific instructions closer to the procedure. Next INR scheduled for 7/2/24  "

## 2024-06-28 DIAGNOSIS — Q21.12 PFO (PATENT FORAMEN OVALE): ICD-10-CM

## 2024-06-28 DIAGNOSIS — I27.0 PRIMARY PULMONARY HYPERTENSION: ICD-10-CM

## 2024-06-28 DIAGNOSIS — I27.9 CHRONIC PULMONARY HEART DISEASE: ICD-10-CM

## 2024-06-28 DIAGNOSIS — I48.0 PAROXYSMAL ATRIAL FIBRILLATION: ICD-10-CM

## 2024-06-28 DIAGNOSIS — E66.2 PICKWICKIAN SYNDROME: ICD-10-CM

## 2024-06-28 DIAGNOSIS — I48.91 ATRIAL FIBRILLATION, UNSPECIFIED TYPE: ICD-10-CM

## 2024-06-28 DIAGNOSIS — I27.20 PULMONARY HYPERTENSION: ICD-10-CM

## 2024-06-28 DIAGNOSIS — E05.90 HYPERTHYROIDISM: ICD-10-CM

## 2024-06-28 DIAGNOSIS — I27.81 COR PULMONALE: ICD-10-CM

## 2024-06-29 NOTE — TELEPHONE ENCOUNTER
Care Due:                  Date            Visit Type   Department     Provider  --------------------------------------------------------------------------------                                HOSPITAL     [facility] INTERNAL  Last Visit: 04-      FOLLOW UP    MEDICINE       Gianni Escalona  Next Visit: None Scheduled  None         None Found                                                            Last  Test          Frequency    Reason                     Performed    Due Date  --------------------------------------------------------------------------------    Uric Acid...  12 months..  allopurinoL..............  Not Found    Overdue    [employer] Embedded Care Due Messages. Reference number: [id number].   6/28/2024 10:02:50 PM CDT

## 2024-07-01 ENCOUNTER — TELEPHONE (OUTPATIENT)
Dept: GASTROENTEROLOGY | Facility: CLINIC | Age: 49
End: 2024-07-01
Payer: MEDICARE

## 2024-07-01 NOTE — TELEPHONE ENCOUNTER
Called patient to make him aware. Patient states he does have an appt tomorrow INR. Also mentioned to patient per clearance, specific instructions will be discuss closer to procedure dates. Pt verbalized understanding.

## 2024-07-01 NOTE — TELEPHONE ENCOUNTER
----- Message from Pierce Bush PharmD sent at 6/25/2024 10:38 AM CDT -----  Regarding: RE: Coumadin Clearance Request  He may hold his coumadin 3-5 days prior to procedure(s). He is scheduled for his next INR 7/2/24. We will discuss specific instructions closer to procedure date.   Thanks!  Nuha  ----- Message -----  From: Shanae Chinchilla PharmD  Sent: 6/25/2024   9:05 AM CDT  To: Pierce Buhs PharmD  Subject: FW: Coumadin Clearance Request                     ----- Message -----  From: Gracie Thompson LPN  Sent: 6/25/2024   8:00 AM CDT  To: Shanae Chinchilla PharmD  Subject: Coumadin Clearance Request                       Good morning Shanae,   Mr. Mcintyre is scheduled for an EGD on 7/25/24 and colonoscopy on 8/08/24 with Dr. Mary Townsend. Patient states he is taking Coumadin. We are requesting clearance for patient to hold Coumadin for 3 - 5 days prior to his scheduled procedures.   Thanks  Gracie Thompson LPN

## 2024-07-02 ENCOUNTER — LAB VISIT (OUTPATIENT)
Dept: LAB | Facility: HOSPITAL | Age: 49
End: 2024-07-02
Payer: MEDICARE

## 2024-07-02 DIAGNOSIS — I27.9 CHRONIC PULMONARY HEART DISEASE: ICD-10-CM

## 2024-07-02 DIAGNOSIS — Z79.01 LONG TERM CURRENT USE OF ANTICOAGULANT THERAPY: ICD-10-CM

## 2024-07-02 LAB
INR PPP: 2.8 (ref 0.8–1.2)
PROTHROMBIN TIME: 28.4 SEC (ref 9–12.5)

## 2024-07-02 PROCEDURE — 36415 COLL VENOUS BLD VENIPUNCTURE: CPT | Performed by: INTERNAL MEDICINE

## 2024-07-02 PROCEDURE — 85610 PROTHROMBIN TIME: CPT | Performed by: INTERNAL MEDICINE

## 2024-07-03 ENCOUNTER — ANTI-COAG VISIT (OUTPATIENT)
Dept: CARDIOLOGY | Facility: CLINIC | Age: 49
End: 2024-07-03
Payer: MEDICARE

## 2024-07-03 DIAGNOSIS — I27.9 CHRONIC PULMONARY HEART DISEASE: Primary | ICD-10-CM

## 2024-07-03 PROCEDURE — 93793 ANTICOAG MGMT PT WARFARIN: CPT | Mod: S$GLB,,, | Performed by: PHARMACIST

## 2024-07-03 RX ORDER — ALLOPURINOL 300 MG/1
300 TABLET ORAL DAILY
Qty: 90 TABLET | Refills: 3 | Status: ON HOLD | OUTPATIENT
Start: 2024-07-03

## 2024-07-03 NOTE — PROGRESS NOTES
[facility]    2024 9:11 AM    Assessment/Plan:    Patient presents today with therapeutic INR.    Assessment of patient findings and chart review: INR at goal. Medications and chart reviewed. No changes noted to necessitate adjustment of warfarin or follow-up plan. See calendar.      Recommendation for patient's warfarin regimen: Continue current maintenance dose    Recommend repeat INR in 2 weeks  _________________________________________________________________    Yong Mcintyre (48 y.o.) is followed by the [facility].    Anticoagulation Summary  As of 7/3/2024      INR goal:  2.0-3.0   TTR:  54.2% (11.4 y)   INR used for dosin.8 (2024)   Warfarin maintenance plan:  0 mg every Thu; 5 mg (10 mg x 0.5) all other days   Weekly warfarin total:  30 mg   Plan last modified:  Pierce Bush, PharmD (2024)   Next INR check:  2024   Target end date:  Indefinite    Indications    Chronic pulmonary heart disease  unspecified [I27.9]                 Anticoagulation Episode Summary       INR check location:  Home Health     Preferred lab:  [facility]    Send INR reminders to:  DARIN GRAYSON [facility]    Comments:  3/22/24: STAT HH; STAT HH;  (Ph) [phone], (Fx) [phone]  [facility]//Infusion Suite, every other Mon -- ph [phone]// <VENIPUNCTURE ONLY (POC/meter does not work for pt)>          Anticoagulation Care Providers       Provider Role Specialty Phone number    Gianni Escalona MD [facility] [phone]

## 2024-07-04 ENCOUNTER — HOSPITAL ENCOUNTER (INPATIENT)
Facility: HOSPITAL | Age: 49
LOS: 25 days | Discharge: NURSING, REHAB OR LONG-TERM CARE | DRG: 291 | End: 2024-07-29
Attending: STUDENT IN AN ORGANIZED HEALTH CARE EDUCATION/TRAINING PROGRAM | Admitting: INTERNAL MEDICINE
Payer: MEDICARE

## 2024-07-04 DIAGNOSIS — I49.9 ARRHYTHMIA: ICD-10-CM

## 2024-07-04 DIAGNOSIS — J96.22 ACUTE ON CHRONIC RESPIRATORY FAILURE WITH HYPOXIA AND HYPERCAPNIA: ICD-10-CM

## 2024-07-04 DIAGNOSIS — Z51.5 PALLIATIVE CARE ENCOUNTER: ICD-10-CM

## 2024-07-04 DIAGNOSIS — I50.33 ACUTE ON CHRONIC DIASTOLIC CHF (CONGESTIVE HEART FAILURE), NYHA CLASS 4: ICD-10-CM

## 2024-07-04 DIAGNOSIS — R06.02 SHORTNESS OF BREATH: ICD-10-CM

## 2024-07-04 DIAGNOSIS — I48.4 ATYPICAL ATRIAL FLUTTER: ICD-10-CM

## 2024-07-04 DIAGNOSIS — I50.9 ACUTE DECOMPENSATED HEART FAILURE: ICD-10-CM

## 2024-07-04 DIAGNOSIS — R00.0 TACHYCARDIA: ICD-10-CM

## 2024-07-04 DIAGNOSIS — J96.21 ACUTE ON CHRONIC RESPIRATORY FAILURE WITH HYPOXIA AND HYPERCAPNIA: ICD-10-CM

## 2024-07-04 DIAGNOSIS — I51.9 RIGHT VENTRICULAR DYSFUNCTION: ICD-10-CM

## 2024-07-04 DIAGNOSIS — N17.9 AKI (ACUTE KIDNEY INJURY): ICD-10-CM

## 2024-07-04 DIAGNOSIS — Z71.89 ADVANCE CARE PLANNING: ICD-10-CM

## 2024-07-04 DIAGNOSIS — R79.1 SUPRATHERAPEUTIC INR: ICD-10-CM

## 2024-07-04 DIAGNOSIS — I50.9 CONGESTIVE HEART FAILURE, UNSPECIFIED HF CHRONICITY, UNSPECIFIED HEART FAILURE TYPE: Primary | ICD-10-CM

## 2024-07-04 LAB
ALBUMIN SERPL BCP-MCNC: 2.9 G/DL (ref 3.5–5.2)
ALLENS TEST: ABNORMAL
ALLENS TEST: NORMAL
ALP SERPL-CCNC: 110 U/L (ref 55–135)
ALT SERPL W/O P-5'-P-CCNC: 14 U/L (ref 10–44)
ANION GAP SERPL CALC-SCNC: 14 MMOL/L (ref 8–16)
AST SERPL-CCNC: 26 U/L (ref 10–40)
BASOPHILS # BLD AUTO: 0.04 K/UL (ref 0–0.2)
BASOPHILS NFR BLD: 0.6 % (ref 0–1.9)
BILIRUB SERPL-MCNC: 1.1 MG/DL (ref 0.1–1)
BILIRUB UR QL STRIP: NEGATIVE
BNP SERPL-MCNC: 804 PG/ML (ref 0–99)
BUN SERPL-MCNC: 101 MG/DL (ref 6–20)
BUN SERPL-MCNC: 102 MG/DL (ref 6–30)
CALCIUM SERPL-MCNC: 9.7 MG/DL (ref 8.7–10.5)
CHLORIDE SERPL-SCNC: 88 MMOL/L (ref 95–110)
CHLORIDE SERPL-SCNC: 92 MMOL/L (ref 95–110)
CLARITY UR REFRACT.AUTO: CLEAR
CO2 SERPL-SCNC: 30 MMOL/L (ref 23–29)
COLOR UR AUTO: YELLOW
CREAT SERPL-MCNC: 3.1 MG/DL (ref 0.5–1.4)
CREAT SERPL-MCNC: 3.2 MG/DL (ref 0.5–1.4)
DIFFERENTIAL METHOD BLD: ABNORMAL
EOSINOPHIL # BLD AUTO: 0 K/UL (ref 0–0.5)
EOSINOPHIL NFR BLD: 0 % (ref 0–8)
ERYTHROCYTE [DISTWIDTH] IN BLOOD BY AUTOMATED COUNT: 21.8 % (ref 11.5–14.5)
EST. GFR  (NO RACE VARIABLE): 23.9 ML/MIN/1.73 M^2
GLUCOSE SERPL-MCNC: 137 MG/DL (ref 70–110)
GLUCOSE SERPL-MCNC: 138 MG/DL (ref 70–110)
GLUCOSE UR QL STRIP: NEGATIVE
HCO3 UR-SCNC: 38.9 MMOL/L (ref 24–28)
HCT VFR BLD AUTO: 55.1 % (ref 40–54)
HCT VFR BLD CALC: 57 %PCV (ref 36–54)
HGB BLD-MCNC: 15.4 G/DL (ref 14–18)
HGB UR QL STRIP: NEGATIVE
IMM GRANULOCYTES # BLD AUTO: 0.02 K/UL (ref 0–0.04)
IMM GRANULOCYTES NFR BLD AUTO: 0.3 % (ref 0–0.5)
INR PPP: 3.2 (ref 0.8–1.2)
KETONES UR QL STRIP: NEGATIVE
LDH SERPL L TO P-CCNC: 1.64 MMOL/L (ref 0.5–2.2)
LEUKOCYTE ESTERASE UR QL STRIP: NEGATIVE
LYMPHOCYTES # BLD AUTO: 0.9 K/UL (ref 1–4.8)
LYMPHOCYTES NFR BLD: 14 % (ref 18–48)
MAGNESIUM SERPL-MCNC: 2.3 MG/DL (ref 1.6–2.6)
MCH RBC QN AUTO: 24 PG (ref 27–31)
MCHC RBC AUTO-ENTMCNC: 27.9 G/DL (ref 32–36)
MCV RBC AUTO: 86 FL (ref 82–98)
MONOCYTES # BLD AUTO: 0.6 K/UL (ref 0.3–1)
MONOCYTES NFR BLD: 8.8 % (ref 4–15)
NEUTROPHILS # BLD AUTO: 5 K/UL (ref 1.8–7.7)
NEUTROPHILS NFR BLD: 76.3 % (ref 38–73)
NITRITE UR QL STRIP: NEGATIVE
NRBC BLD-RTO: 0 /100 WBC
PCO2 BLDA: 52.9 MMHG (ref 35–45)
PH SMN: 7.47 [PH] (ref 7.35–7.45)
PH UR STRIP: 7 [PH] (ref 5–8)
PHOSPHATE SERPL-MCNC: 3 MG/DL (ref 2.7–4.5)
PLATELET # BLD AUTO: 208 K/UL (ref 150–450)
PMV BLD AUTO: 10.1 FL (ref 9.2–12.9)
PO2 BLDA: 55 MMHG (ref 40–60)
POC BE: 15 MMOL/L
POC IONIZED CALCIUM: 1.02 MMOL/L (ref 1.06–1.42)
POC SATURATED O2: 89 % (ref 95–100)
POC TCO2 (MEASURED): 30 MMOL/L (ref 23–29)
POC TCO2: 40 MMOL/L (ref 24–29)
POTASSIUM BLD-SCNC: 3.7 MMOL/L (ref 3.5–5.1)
POTASSIUM SERPL-SCNC: 3.9 MMOL/L (ref 3.5–5.1)
PROT SERPL-MCNC: 7.8 G/DL (ref 6–8.4)
PROT UR QL STRIP: NEGATIVE
PROTHROMBIN TIME: 32.3 SEC (ref 9–12.5)
RBC # BLD AUTO: 6.43 M/UL (ref 4.6–6.2)
SAMPLE: ABNORMAL
SAMPLE: ABNORMAL
SAMPLE: NORMAL
SITE: ABNORMAL
SITE: NORMAL
SODIUM BLD-SCNC: 131 MMOL/L (ref 136–145)
SODIUM SERPL-SCNC: 132 MMOL/L (ref 136–145)
SP GR UR STRIP: 1.01 (ref 1–1.03)
T4 FREE SERPL-MCNC: 1.07 NG/DL (ref 0.71–1.51)
TROPONIN I SERPL DL<=0.01 NG/ML-MCNC: 0.02 NG/ML (ref 0–0.03)
TSH SERPL DL<=0.005 MIU/L-ACNC: 4.2 UIU/ML (ref 0.4–4)
URN SPEC COLLECT METH UR: NORMAL
WBC # BLD AUTO: 6.56 K/UL (ref 3.9–12.7)

## 2024-07-04 PROCEDURE — 94761 N-INVAS EAR/PLS OXIMETRY MLT: CPT | Mod: XB

## 2024-07-04 PROCEDURE — 84443 ASSAY THYROID STIM HORMONE: CPT | Performed by: STUDENT IN AN ORGANIZED HEALTH CARE EDUCATION/TRAINING PROGRAM

## 2024-07-04 PROCEDURE — 84484 ASSAY OF TROPONIN QUANT: CPT | Performed by: STUDENT IN AN ORGANIZED HEALTH CARE EDUCATION/TRAINING PROGRAM

## 2024-07-04 PROCEDURE — 99291 CRITICAL CARE FIRST HOUR: CPT

## 2024-07-04 PROCEDURE — 81003 URINALYSIS AUTO W/O SCOPE: CPT | Performed by: STUDENT IN AN ORGANIZED HEALTH CARE EDUCATION/TRAINING PROGRAM

## 2024-07-04 PROCEDURE — 63600175 PHARM REV CODE 636 W HCPCS: Performed by: STUDENT IN AN ORGANIZED HEALTH CARE EDUCATION/TRAINING PROGRAM

## 2024-07-04 PROCEDURE — 84100 ASSAY OF PHOSPHORUS: CPT | Performed by: STUDENT IN AN ORGANIZED HEALTH CARE EDUCATION/TRAINING PROGRAM

## 2024-07-04 PROCEDURE — 85610 PROTHROMBIN TIME: CPT | Performed by: STUDENT IN AN ORGANIZED HEALTH CARE EDUCATION/TRAINING PROGRAM

## 2024-07-04 PROCEDURE — 80053 COMPREHEN METABOLIC PANEL: CPT | Performed by: STUDENT IN AN ORGANIZED HEALTH CARE EDUCATION/TRAINING PROGRAM

## 2024-07-04 PROCEDURE — 12000002 HC ACUTE/MED SURGE SEMI-PRIVATE ROOM

## 2024-07-04 PROCEDURE — 25000003 PHARM REV CODE 250: Performed by: STUDENT IN AN ORGANIZED HEALTH CARE EDUCATION/TRAINING PROGRAM

## 2024-07-04 PROCEDURE — 85025 COMPLETE CBC W/AUTO DIFF WBC: CPT | Performed by: STUDENT IN AN ORGANIZED HEALTH CARE EDUCATION/TRAINING PROGRAM

## 2024-07-04 PROCEDURE — 99900035 HC TECH TIME PER 15 MIN (STAT)

## 2024-07-04 PROCEDURE — 27100171 HC OXYGEN HIGH FLOW UP TO 24 HOURS

## 2024-07-04 PROCEDURE — 94799 UNLISTED PULMONARY SVC/PX: CPT

## 2024-07-04 PROCEDURE — 96374 THER/PROPH/DIAG INJ IV PUSH: CPT

## 2024-07-04 PROCEDURE — 82803 BLOOD GASES ANY COMBINATION: CPT

## 2024-07-04 PROCEDURE — 83605 ASSAY OF LACTIC ACID: CPT

## 2024-07-04 PROCEDURE — 83880 ASSAY OF NATRIURETIC PEPTIDE: CPT | Performed by: STUDENT IN AN ORGANIZED HEALTH CARE EDUCATION/TRAINING PROGRAM

## 2024-07-04 PROCEDURE — 84439 ASSAY OF FREE THYROXINE: CPT | Performed by: STUDENT IN AN ORGANIZED HEALTH CARE EDUCATION/TRAINING PROGRAM

## 2024-07-04 PROCEDURE — 83735 ASSAY OF MAGNESIUM: CPT | Performed by: STUDENT IN AN ORGANIZED HEALTH CARE EDUCATION/TRAINING PROGRAM

## 2024-07-04 PROCEDURE — 20000000 HC ICU ROOM

## 2024-07-04 RX ORDER — ALLOPURINOL 100 MG/1
300 TABLET ORAL DAILY
Status: DISCONTINUED | OUTPATIENT
Start: 2024-07-05 | End: 2024-07-29 | Stop reason: HOSPADM

## 2024-07-04 RX ORDER — FLUTICASONE PROPIONATE AND SALMETEROL 250; 50 UG/1; UG/1
1 POWDER RESPIRATORY (INHALATION) 2 TIMES DAILY
Status: DISCONTINUED | OUTPATIENT
Start: 2024-07-05 | End: 2024-07-29 | Stop reason: HOSPADM

## 2024-07-04 RX ORDER — PANTOPRAZOLE SODIUM 40 MG/1
40 TABLET, DELAYED RELEASE ORAL DAILY
Status: DISCONTINUED | OUTPATIENT
Start: 2024-07-05 | End: 2024-07-29 | Stop reason: HOSPADM

## 2024-07-04 RX ORDER — BACITRACIN ZINC 500 [USP'U]/G
1 OINTMENT TOPICAL
Status: COMPLETED | OUTPATIENT
Start: 2024-07-04 | End: 2024-07-04

## 2024-07-04 RX ORDER — SODIUM CHLORIDE 0.9 % (FLUSH) 0.9 %
10 SYRINGE (ML) INJECTION
Status: DISCONTINUED | OUTPATIENT
Start: 2024-07-04 | End: 2024-07-29 | Stop reason: HOSPADM

## 2024-07-04 RX ORDER — WARFARIN SODIUM 5 MG/1
5 TABLET ORAL
Status: DISCONTINUED | OUTPATIENT
Start: 2024-07-05 | End: 2024-07-09

## 2024-07-04 RX ORDER — ALBUTEROL SULFATE 90 UG/1
2 AEROSOL, METERED RESPIRATORY (INHALATION) EVERY 4 HOURS PRN
Status: DISCONTINUED | OUTPATIENT
Start: 2024-07-05 | End: 2024-07-29 | Stop reason: HOSPADM

## 2024-07-04 RX ORDER — POTASSIUM CHLORIDE 750 MG/1
10 CAPSULE, EXTENDED RELEASE ORAL 2 TIMES DAILY
Status: DISCONTINUED | OUTPATIENT
Start: 2024-07-05 | End: 2024-07-05

## 2024-07-04 RX ORDER — FUROSEMIDE 10 MG/ML
80 INJECTION INTRAMUSCULAR; INTRAVENOUS
Status: COMPLETED | OUTPATIENT
Start: 2024-07-04 | End: 2024-07-04

## 2024-07-04 RX ORDER — FUROSEMIDE 10 MG/ML
80 INJECTION INTRAMUSCULAR; INTRAVENOUS ONCE
Status: COMPLETED | OUTPATIENT
Start: 2024-07-05 | End: 2024-07-05

## 2024-07-04 RX ADMIN — BACITRACIN 1 EACH: 500 OINTMENT TOPICAL at 08:07

## 2024-07-04 RX ADMIN — FUROSEMIDE 80 MG: 10 INJECTION, SOLUTION INTRAVENOUS at 08:07

## 2024-07-04 NOTE — ED NOTES
Initial Prenatal Note     CC: Amenorrhea, positive pregnancy test     HPI:  Lucia Gregory is a 29 y.o.  who presents for initial prenatal appointment. Since her LMP she has experienced mild nausea. She denies dysuria, discharge, vaginal bleeding.     LMP history:  The date of her LMP is unknown.        Ultrasound data:  TA ULTRASOUND PERFORMED A SINGLE VIABLE 9W4D WITH GEOVANNA OF 2025 IUP IS SEEN WITH NORMAL CARDIAC RHYTHM. GESTATIONAL AGE BASED ON TODAY'S ULTRASOUND. A NORMAL YOLK SAC IS SEEN. RIGHT OVARY APPEARS WNL. LEFT OVARY APPEARS WNL. NO FREE FLUID IS SEEN IN THE CDS.      She is dated by [facility].     Relevant past pregnancy history:  She has the following pregnancy history:  Vaginal post dates x2   She does not history of  delivery.     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   Her partner's name is Cuong [place].     Initial OB note:  Please refer to problem list for concerns        Substance history: negative for alcohol, tobacco and street drugs.           Positive for nothing.  Exposure history: There is/are no indoor cat/s in the home.  The patient was instructed to not change the cat litter.   She admits close contact with children on a regular basis.   She has had chicken pox or the vaccine in the past.   Patient denies issues with domestic violence.     Genetic Screening/Teratology Counseling: (Includes patient, baby's father, or anyone in either family with:)  1.  Patient's age >/= 35 at EDC?--no  2.  Thalassemia (Lithuanian, Greek, Mediterranean, or  background): MCV<80?--no.     3.  Neural tube defect (meningomyelocele, spina bifida, anencephaly)?--no.   4.  Congenital heart defect?--no.  5.  Down syndrome?--no.   6.  Deandre-Sachs (Spiritism, Yi Burmese)?--no.   7.  Canavan's Disease?--no.   8.  Familial Dysautonomia?--no.   9.  Sickle cell disease or trait ()?--no   The  Pt placed on 15L NRB. MD Pollard at bedside

## 2024-07-04 NOTE — ED PROVIDER NOTES
Source of History  patient and EMR    Chief Complaint    Leg Swelling (On home oxygen 3l, gave self 3 puffs albuterol in triage, said got sob with walking )      History of Present Illness    Yong Mcintyre is a 48 y.o. male presenting with concern for dyspnea.  Patient has pulmonary HTN on 3lpm home O2, obesity hypoventilation, HFpEF (60% last echo), afib on coumadin, HTN. He reports 2 days of dyspnea and feeling with heavy swollen legs, increasing dyspnea with exertion. He reports no fever. He has had no sick contacts. He drove himself here but was very dyspneic.    On arrival, tachypneic, O2sat 80% on 3lpm NC.  Reports compliance with torsemide and metolazone.  Having difficulty getting into cardiology outpatient.    Recently admitted in April for similar.    Review of Systems    As per HPI and below:  Review of Systems:    Pertinent information obtained as above.  Otherwise limited due to: acuity     Some ROS as noted above in HPI    Past History    As per HPI and below:  Past Medical History:   Diagnosis Date    Arthritis     CHF (congestive heart failure)     Diastolic dysfunction    Cor pulmonale 11/05/2012    Gallstones     GERD (gastroesophageal reflux disease)     Hypertension     Morbid obesity 11/05/2012    Obesity hypoventilation syndrome     On home oxygen therapy 03/07/2014    MALLIKA (obstructive sleep apnea)     BPAP 16/11 with 3 L at night (continuous O2).    Paroxysmal atrial fibrillation     PFO (patent foramen ovale) 11/05/2012    status post closure    Pickwickian syndrome 11/05/2012    Pulmonary hypertension        Past Surgical History:   Procedure Laterality Date    CHOLECYSTECTOMY      RIGHT HEART CATHETERIZATION Right 03/22/2022    Procedure: INSERTION, CATHETER, RIGHT HEART;  Surgeon: Tony Martínez MD;  Location: [facility] CATH LAB;  Service: Cardiology;  Laterality: Right;    RIGHT HEART CATHETERIZATION Right 01/31/2024    Procedure: INSERTION, CATHETER, RIGHT HEART;  Surgeon: Sheri Ritter,  MD;  Location: [facility] CATH LAB;  Service: Cardiology;  Laterality: Right;    UPPER GASTROINTESTINAL ENDOSCOPY      2-3 years ago       Social History     Socioeconomic History    Marital status: Single   Tobacco Use    Smoking status: Never    Smokeless tobacco: Never   Substance and Sexual Activity    Alcohol use: Yes     Comment: holidays  rare    Drug use: No    Sexual activity: Not Currently     Partners: Female     Social Determinants of Health     Financial Resource Strain: Low Risk  (4/24/2024)    Overall Financial Resource Strain (CARDIA)     Difficulty of Paying Living Expenses: Not hard at all   Recent Concern: Financial Resource Strain - Medium Risk (4/7/2024)    Overall Financial Resource Strain (CARDIA)     Difficulty of Paying Living Expenses: Somewhat hard   Food Insecurity: No Food Insecurity (4/24/2024)    Hunger Vital Sign     Worried About Running Out of Food in the Last Year: Never true     Ran Out of Food in the Last Year: Never true   Recent Concern: Food Insecurity - Food Insecurity Present (4/7/2024)    Hunger Vital Sign     Worried About Running Out of Food in the Last Year: Sometimes true     Ran Out of Food in the Last Year: Never true   Transportation Needs: No Transportation Needs (4/24/2024)    PRAPARE - Transportation     Lack of Transportation (Medical): No     Lack of Transportation (Non-Medical): No   Physical Activity: Inactive (4/24/2024)    Exercise Vital Sign     Days of Exercise per Week: 0 days     Minutes of Exercise per Session: 0 min   Stress: No Stress Concern Present (4/24/2024)    [place] [employer] - Occupational Stress Questionnaire     Feeling of Stress : Only a little   Recent Concern: Stress - Stress Concern Present (4/24/2024)    [place] [employer] - Occupational Stress Questionnaire     Feeling of Stress : To some extent   Housing Stability: Low Risk  (4/24/2024)    Housing Stability Vital Sign     Unable to Pay for  "Housing in the Last Year: No     Homeless in the Last Year: No       Family History   Problem Relation Name Age of Onset    Arthritis Mother      Asthma Mother      Hypertension Father      Asthma Sister      Down syndrome Brother      Gout Brother      Arthritis Maternal Grandmother      Asthma Maternal Grandmother      Diabetes Maternal Grandmother      Hypertension Maternal Grandmother      Arthritis Maternal Grandfather      Hypertension Maternal Grandfather      Breast cancer Paternal Grandmother      Hypertension Paternal Grandfather      Colon cancer Neg Hx         Review of patient's allergies indicates:   Allergen Reactions    Lipitor [atorvastatin] Other (See Comments)     "it makes my legs feel like jelly"  Other reaction(s): causes legs to hurt       No current facility-administered medications on file prior to encounter.     Current Outpatient Medications on File Prior to Encounter   Medication Sig Dispense Refill    acetaminophen (TYLENOL ARTHRITIS ORAL) Take 2 tablets by mouth daily as needed (pain).      albuterol (VENTOLIN HFA) 90 mcg/actuation inhaler Inhale 2 puffs into the lungs every 4 (four) hours as needed for Wheezing. Rescue 18 g 2    allopurinoL (ZYLOPRIM) 300 MG tablet Take 1 tablet (300 mg total) by mouth once daily. 90 tablet 3    ascorbic acid (VITAMIN C ORAL) Take 1 tablet by mouth once daily.      b complex vitamins tablet Take 1 tablet by mouth once daily.      CALCIUM ORAL Take 1 capsule by mouth once daily.      metOLazone (ZAROXOLYN) 2.5 MG tablet Take 1 tablet (2.5 mg total) by mouth every Mon, Wed, Fri, Sun. Take 2.5 mg 4 times a week. In addition, if you gain 3lbs in a day or 5 lbs in three days, take an extra dose of metolazone regardless of day of the week 16 tablet 11    multivit,calc,min/FA/K1/lycop (ONE-A-DAY MEN'S COMPLETE ORAL) Take 1 tablet by mouth once daily.      pantoprazole (PROTONIX) 40 MG tablet Take 1 tablet (40 mg total) by mouth once daily. (Patient taking " "differently: Take 40 mg by mouth every morning.) 90 tablet 3    potassium chloride (MICRO-K) 10 MEQ CpSR Take 10 mEq by mouth 2 (two) times daily. Take 2 capsules by mouth twice daily.      tiotropium (SPIRIVA) 18 mcg inhalation capsule Inhale 18 mcg into the lungs once daily.      torsemide (DEMADEX) 20 MG Tab Take 3 tablets (60 mg total) by mouth 2 (two) times a day. 270 tablet 3    warfarin (COUMADIN) 10 MG tablet Take 1/2 tablet (5 mg) by mouth daily or as directed by [facility] (Patient taking differently: Take 5 mg by mouth Daily. Take 1/2 Tablet by mouth daily Or as directed by the coumadin clinic.) 30 tablet 3    WIXELA INHUB 250-50 mcg/dose diskus inhaler 1 puff 2 (two) times daily. USE 1 INHALATION BY MOUTH TWICE DAILY      [DISCONTINUED] sildenafil (REVATIO) 20 mg Tab Take 1 tablet (20 mg total) by mouth 3 (three) times daily. 270 tablet prn       Physical Exam    Reviewed nursing notes.  Vitals:    07/04/24 1915 07/04/24 2000 07/04/24 2015 07/04/24 2022   BP: (!) 93/57 (!) 101/55     Pulse: 109 103 104    Resp: 20 16 17    Temp:    98 °F (36.7 °C)   TempSrc:    Oral   SpO2: 96% 98% 96%    Weight:       Height:         General:  Alert, obese, ambulating, in distress, ill-appearing, elevated BMI.  Skin:  Warm, dry, small open wound RLE from "popped blister"  Head:  Normocephalic, atraumatic.    Neck:  Supple.   HEENT:  Pupils are equal and round, appropriate for room, extraocular movements are intact.  Normal phonation. Dry membranes.  Cardiovascular:  tachycardic rate and rhythm, rather severe bilateral lower extremity edema. Palpable pulses in all extremities.  Respiratory:  labored breathing. Rales bilaterally.  Gastrointestinal:  Soft, Nontender.  Back:  Nontender. Normal gait.  Ambulatory.  Musculoskeletal:  Normal range of motion observed.  Neurological:  Alert and oriented to person, place, time, and situation.  No focal deficits observed.   Psychiatric:  Cooperative, appropriate mood & affect. "       Initial MDM and Data Review    48 y.o. male presenting for evaluation of acute on chronic hypoxic and dyspnea.   Patient with severe right heart failure and pulm HTN  On NC at baseline    Differential includes but is not limited to: acute on chronic CHF, worsening CHF, pulmonary edema/volume overload, doubt infection  Electrolyte disturbances (recent K in the high 2s and hx of CKD)  ACS    Work-up includes: istat, CBC/CMP/BNP/trop, EKG, cxr  Bedside US    Interventions include: diuretics  Increasing O2 requirements will require supplemental O2    On arrival - patient was placed on 6lpm NC without much improvement. Adjusted to 15lpm Fio2 100% NRB which improved SPO2 98%. Then, due to suspected volume overload, I moved to NIPPV with high flow nasal cannula to provide some PEEP and Oxygen.  He tolerated this very well.  His work of breathing is improved at rest and with high flow.    My POCUS of the lungs revealed +lung sliding but no significant amount of B lines, interestingly. POCUS echo showed R heart dilation without an obvious pericardial effusion. Cardiomegaly. EF appeared diminished but dilation was severe making this difficult to interpret.    He oscillates around SBP 90s-100s  Lactate POC is normal making cardiogenic shock less likely  Diuretics are still needed    The patient has significant medical comorbidities that influence decision making for this acute process, such as: CHF, HTN, obesity    I decided to obtain the patient's medical records and review relevant documentation from hospital records.  Pertinent information is noted.      Medications   furosemide injection 80 mg (80 mg Intravenous Given 7/4/24 2009)   bacitracin zinc ointment 1 each (1 each Topical (Top) Given 7/4/24 2009)       Results and ED Course    Labs Reviewed   CBC W/ AUTO DIFFERENTIAL - Abnormal; Notable for the following components:       Result Value    RBC 6.43 (*)     Hematocrit 55.1 (*)     MCH 24.0 (*)     MCHC 27.9 (*)      RDW 21.8 (*)     Lymph # 0.9 (*)     Gran % 76.3 (*)     Lymph % 14.0 (*)     All other components within normal limits   COMPREHENSIVE METABOLIC PANEL - Abnormal; Notable for the following components:    Sodium 132 (*)     Chloride 88 (*)     CO2 30 (*)     Glucose 137 (*)      (*)     Creatinine 3.1 (*)     Albumin 2.9 (*)     Total Bilirubin 1.1 (*)     eGFR 23.9 (*)     All other components within normal limits   B-TYPE NATRIURETIC PEPTIDE - Abnormal; Notable for the following components:     (*)     All other components within normal limits   PROTIME-INR - Abnormal; Notable for the following components:    Prothrombin Time 32.3 (*)     INR 3.2 (*)     All other components within normal limits   TSH - Abnormal; Notable for the following components:    TSH 4.197 (*)     All other components within normal limits   ISTAT PROCEDURE - Abnormal; Notable for the following components:    POC PH 7.474 (*)     POC PCO2 52.9 (*)     POC HCO3 38.9 (*)     POC BE 15 (*)     POC TCO2 40 (*)     All other components within normal limits   ISTAT PROCEDURE - Abnormal; Notable for the following components:    POC Glucose 138 (*)     POC  (*)     POC Creatinine 3.2 (*)     POC Sodium 131 (*)     POC Chloride 92 (*)     POC TCO2 (MEASURED) 30 (*)     POC Ionized Calcium 1.02 (*)     POC Hematocrit 57 (*)     All other components within normal limits   TROPONIN I   MAGNESIUM   PHOSPHORUS   URINALYSIS, REFLEX TO URINE CULTURE    Narrative:     Specimen Source->Urine   T4, FREE   ISTAT LACTATE   ISTAT CHEM8       Imaging Results              X-Ray Chest AP Portable (Final result)  Result time 07/04/24 19:34:23      Final result by Real Louis MD (07/04/24 19:34:23)                   Impression:      Cardiomegaly with pulmonary vascular congestion, suggestive of pulmonary edema secondary to CHF.      Electronically signed by: Real Louis MD  Date:    07/04/2024  Time:    19:34               Narrative:     EXAMINATION:  XR CHEST AP PORTABLE    CLINICAL HISTORY:  CHF;    TECHNIQUE:  Single frontal view of the chest was performed.    COMPARISON:  04/24/2024.    FINDINGS:  Monitoring EKG leads are present.  The trachea is unremarkable.  The cardiomediastinal silhouette is enlarged.  There is no evidence of free air beneath the hemidiaphragms.  There are no pleural effusions.  There is no evidence of a pneumothorax.  There is no evidence of pneumomediastinum.  There is pulmonary vascular congestion.  There is no focal consolidation.  The osseous structures are unremarkable.                                      ED Course as of 07/05/24 1016   u Jul 04, 2024 1857 INR(!): 3.2  supratherapeutic [AC]   1857 WBC: 6.56 [AC]   1857 Hemoglobin: 15.4 [AC]   1925 Troponin I: 0.018 [AC]   1925 Phosphorus Level: 3.0 [AC]   1925 Magnesium : 2.3 [AC]   1925 BNP(!): 804 [AC]   1925 BUN(!): 101 [AC]   1925 Creatinine(!): 3.1 [AC]   1925 BUN/Cr slightly worsening [AC]   1925 Slightly lower blood pressures  Awaiting lactate and CXR [AC]   2021 Lactate is normal. CXR with cardiomegaly and pulmonary edema. I consulted cardiology after review of labs/vitals - heart transplant services candidate? ICU needs are possible? May need CCU?  [AC]   2021 Cardiology will review and assist with disposition [AC]   2120 Urinalysis, Reflex to Urine Culture Urine, Clean Catch  nml [AC]   2129 Cardiology discussing case with their attending - may need furosemide infusion. Care beyond this hospitalization may be limited, as patient may be more appropriate for palliative care measures.  [AC]      ED Course User Index  [AC] Marcus Pollard DO               EKG interpreted by myself    EKG  Performed: 07/04/2024   Rate/Rhythm/Axis: 107 bpm, sinus rhythm, nml axis   ms  Qtc 501 ms  Impression: sinus tachycardia, TWI  rather diffusely, no STEMI, atrial enlargement with peaked P wave is present      Relevant imaging interpreted by myself  CXR  cardiomegaly and pulmonary edema    Impression and Plan    48 y.o. male with findings of CHF and volume overload based on the work up in the emergency department as above.    Diuresed to start with 80mg furosemide and hi flow for support.    Important lab/imaging findings include: reviewed as above    I consulted with: cardiology  High consideration for CICU admission    All tests, treatment options and disposition options were discussed with the patient.  The decision was made to admit the patient to the hospital.    The patient was admitted in guarded condition and all further questions/concerns by patient and/or family were addressed.    Critical Care:  Date: 07/04/2024  Performed by: Dr. Marcus Pollard  Authorized by: Dr. Marcus Pollard   Total critical care time (exclusive of procedural time) : 60 minutes  Upon my evaluation, this patient had a high probability of imminent or life-threatening deterioration due to [ acute on chronic heart failure, aaron, acute on chronic hypoxic respiratory failure ] which required my direct attention, intervention and personal management.  Critical care was necessary to treat or prevent imminent or life-threatening deterioration.  This may include review of laboratory data, radiology results, discussion with consultants and family, and monitoring for potential decompensations. Interventions were performed as documented.                   Final diagnoses:  [R06.02] Shortness of breath  [I50.9] Congestive heart failure, unspecified HF chronicity, unspecified heart failure type (Primary)  [N17.9] AARON (acute kidney injury)  [R79.1] Supratherapeutic INR        ED Disposition Condition    Admit Stable                  Marcus Pollard,   07/05/24 1021

## 2024-07-04 NOTE — ED TRIAGE NOTES
Patient is a 48 year old male that presents to the ED with c/o SOB and bilateral leg swelling. Pt brought to room 3 immediately from triage for O2 sats in the 80 with his home O2.

## 2024-07-04 NOTE — ED NOTES
I-STAT Chem-8+ Results:   Value Reference Range   Sodium 131 136-145 mmol/L   Potassium  3.7 3.5-5.1 mmol/L   Chloride 92  mmol/L   Ionized Calcium 1.02 1.06-1.42 mmol/L   CO2 (measured) 30 23-29 mmol/L   Glucose 138  mg/dL    6-30 mg/dL   Creatinine 3.2 0.5-1.4 mg/dL   Hematocrit 57 36-54%

## 2024-07-05 PROBLEM — Z71.89 ADVANCE CARE PLANNING: Status: ACTIVE | Noted: 2024-07-05

## 2024-07-05 LAB
ALBUMIN SERPL BCP-MCNC: 2.9 G/DL (ref 3.5–5.2)
ALBUMIN SERPL BCP-MCNC: 3 G/DL (ref 3.5–5.2)
ALP SERPL-CCNC: 111 U/L (ref 55–135)
ALP SERPL-CCNC: 114 U/L (ref 55–135)
ALT SERPL W/O P-5'-P-CCNC: 12 U/L (ref 10–44)
ALT SERPL W/O P-5'-P-CCNC: 13 U/L (ref 10–44)
ANION GAP SERPL CALC-SCNC: 11 MMOL/L (ref 8–16)
ANION GAP SERPL CALC-SCNC: 13 MMOL/L (ref 8–16)
ANION GAP SERPL CALC-SCNC: 13 MMOL/L (ref 8–16)
ASCENDING AORTA: 2.51 CM
AST SERPL-CCNC: 21 U/L (ref 10–40)
AST SERPL-CCNC: 23 U/L (ref 10–40)
AV INDEX (PROSTH): 0.71
AV MEAN GRADIENT: 2 MMHG
AV PEAK GRADIENT: 3 MMHG
AV VALVE AREA BY VELOCITY RATIO: 2.92 CM²
AV VALVE AREA: 2.86 CM²
AV VELOCITY RATIO: 0.73
BASOPHILS # BLD AUTO: 0.05 K/UL (ref 0–0.2)
BASOPHILS NFR BLD: 0.7 % (ref 0–1.9)
BILIRUB SERPL-MCNC: 1.4 MG/DL (ref 0.1–1)
BILIRUB SERPL-MCNC: 1.5 MG/DL (ref 0.1–1)
BSA FOR ECHO PROCEDURE: 2.25 M2
BUN SERPL-MCNC: 100 MG/DL (ref 6–20)
BUN SERPL-MCNC: 101 MG/DL (ref 6–20)
BUN SERPL-MCNC: 98 MG/DL (ref 6–20)
CALCIUM SERPL-MCNC: 10.1 MG/DL (ref 8.7–10.5)
CALCIUM SERPL-MCNC: 9.9 MG/DL (ref 8.7–10.5)
CALCIUM SERPL-MCNC: 9.9 MG/DL (ref 8.7–10.5)
CHLORIDE SERPL-SCNC: 88 MMOL/L (ref 95–110)
CHLORIDE SERPL-SCNC: 89 MMOL/L (ref 95–110)
CHLORIDE SERPL-SCNC: 89 MMOL/L (ref 95–110)
CO2 SERPL-SCNC: 34 MMOL/L (ref 23–29)
CO2 SERPL-SCNC: 34 MMOL/L (ref 23–29)
CO2 SERPL-SCNC: 38 MMOL/L (ref 23–29)
CREAT SERPL-MCNC: 2.6 MG/DL (ref 0.5–1.4)
CREAT SERPL-MCNC: 2.8 MG/DL (ref 0.5–1.4)
CREAT SERPL-MCNC: 3 MG/DL (ref 0.5–1.4)
CV ECHO LV RWT: 0.32 CM
DIFFERENTIAL METHOD BLD: ABNORMAL
DOP CALC AO PEAK VEL: 0.92 M/S
DOP CALC AO VTI: 15.35 CM
DOP CALC LVOT AREA: 4 CM2
DOP CALC LVOT DIAMETER: 2.26 CM
DOP CALC LVOT PEAK VEL: 0.67 M/S
DOP CALC LVOT STROKE VOLUME: 43.9 CM3
DOP CALCLVOT PEAK VEL VTI: 10.95 CM
E WAVE DECELERATION TIME: 125.22 MSEC
E/A RATIO: 0.53
E/E' RATIO: 3.91 M/S
ECHO LV POSTERIOR WALL: 0.81 CM (ref 0.6–1.1)
EOSINOPHIL # BLD AUTO: 0 K/UL (ref 0–0.5)
EOSINOPHIL NFR BLD: 0 % (ref 0–8)
ERYTHROCYTE [DISTWIDTH] IN BLOOD BY AUTOMATED COUNT: 22.5 % (ref 11.5–14.5)
EST. GFR  (NO RACE VARIABLE): 24.8 ML/MIN/1.73 M^2
EST. GFR  (NO RACE VARIABLE): 27 ML/MIN/1.73 M^2
EST. GFR  (NO RACE VARIABLE): 29.5 ML/MIN/1.73 M^2
FRACTIONAL SHORTENING: 22 % (ref 28–44)
GLUCOSE SERPL-MCNC: 107 MG/DL (ref 70–110)
GLUCOSE SERPL-MCNC: 116 MG/DL (ref 70–110)
GLUCOSE SERPL-MCNC: 95 MG/DL (ref 70–110)
HCT VFR BLD AUTO: 54.5 % (ref 40–54)
HGB BLD-MCNC: 15.8 G/DL (ref 14–18)
IMM GRANULOCYTES # BLD AUTO: 0.02 K/UL (ref 0–0.04)
IMM GRANULOCYTES NFR BLD AUTO: 0.3 % (ref 0–0.5)
INR PPP: 2.5 (ref 0.8–1.2)
INTERVENTRICULAR SEPTUM: 0.46 CM (ref 0.6–1.1)
IVRT: 54.23 MSEC
LA MAJOR: 5.71 CM
LA MINOR: 5.67 CM
LA WIDTH: 3.14 CM
LACTATE SERPL-SCNC: 1.1 MMOL/L (ref 0.5–2.2)
LEFT ATRIUM SIZE: 2.54 CM
LEFT ATRIUM VOLUME INDEX MOD: 10.6 ML/M2
LEFT ATRIUM VOLUME INDEX: 17.9 ML/M2
LEFT ATRIUM VOLUME MOD: 22.75 CM3
LEFT ATRIUM VOLUME: 38.57 CM3
LEFT INTERNAL DIMENSION IN SYSTOLE: 4.01 CM (ref 2.1–4)
LEFT VENTRICLE DIASTOLIC VOLUME INDEX: 58.05 ML/M2
LEFT VENTRICLE DIASTOLIC VOLUME: 124.8 ML
LEFT VENTRICLE MASS INDEX: 49 G/M2
LEFT VENTRICLE SYSTOLIC VOLUME INDEX: 32.7 ML/M2
LEFT VENTRICLE SYSTOLIC VOLUME: 70.29 ML
LEFT VENTRICULAR INTERNAL DIMENSION IN DIASTOLE: 5.12 CM (ref 3.5–6)
LEFT VENTRICULAR MASS: 106.01 G
LV LATERAL E/E' RATIO: 3.07 M/S
LV SEPTAL E/E' RATIO: 5.38 M/S
LYMPHOCYTES # BLD AUTO: 1.2 K/UL (ref 1–4.8)
LYMPHOCYTES NFR BLD: 17.2 % (ref 18–48)
MAGNESIUM SERPL-MCNC: 2.4 MG/DL (ref 1.6–2.6)
MAGNESIUM SERPL-MCNC: 2.4 MG/DL (ref 1.6–2.6)
MCH RBC QN AUTO: 24.5 PG (ref 27–31)
MCHC RBC AUTO-ENTMCNC: 29 G/DL (ref 32–36)
MCV RBC AUTO: 85 FL (ref 82–98)
MONOCYTES # BLD AUTO: 0.6 K/UL (ref 0.3–1)
MONOCYTES NFR BLD: 8.1 % (ref 4–15)
MV A" WAVE DURATION": 9.13 MSEC
MV PEAK A VEL: 0.81 M/S
MV PEAK E VEL: 0.43 M/S
MV STENOSIS PRESSURE HALF TIME: 36.31 MS
MV VALVE AREA P 1/2 METHOD: 6.06 CM2
NEUTROPHILS # BLD AUTO: 5.3 K/UL (ref 1.8–7.7)
NEUTROPHILS NFR BLD: 73.7 % (ref 38–73)
NRBC BLD-RTO: 0 /100 WBC
OHS QRS DURATION: 100 MS
OHS QRS DURATION: 110 MS
OHS QRS DURATION: 118 MS
OHS QTC CALCULATION: 465 MS
OHS QTC CALCULATION: 474 MS
OHS QTC CALCULATION: 501 MS
PHOSPHATE SERPL-MCNC: 3.5 MG/DL (ref 2.7–4.5)
PISA TR MAX VEL: 4.06 M/S
PLATELET # BLD AUTO: 202 K/UL (ref 150–450)
PMV BLD AUTO: 10.2 FL (ref 9.2–12.9)
POCT GLUCOSE: 123 MG/DL (ref 70–110)
POTASSIUM SERPL-SCNC: 3.3 MMOL/L (ref 3.5–5.1)
POTASSIUM SERPL-SCNC: 3.4 MMOL/L (ref 3.5–5.1)
POTASSIUM SERPL-SCNC: 3.6 MMOL/L (ref 3.5–5.1)
PROT SERPL-MCNC: 7.7 G/DL (ref 6–8.4)
PROT SERPL-MCNC: 8 G/DL (ref 6–8.4)
PROTHROMBIN TIME: 26.1 SEC (ref 9–12.5)
PULM VEIN S/D RATIO: 0.69
PV PEAK D VEL: 0.36 M/S
PV PEAK S VEL: 0.25 M/S
RA MAJOR: 6.05 CM
RA PRESSURE ESTIMATED: 15 MMHG
RA WIDTH: 6.1 CM
RBC # BLD AUTO: 6.44 M/UL (ref 4.6–6.2)
RIGHT ATRIAL AREA: 31 CM2
RIGHT ATRIUM VOLUME AREA LENGTH APICAL 4 CHAMBER: 119 ML
RIGHT VENTRICLE DIASTOLIC BASEL DIMENSION: 6.4 CM
RV TB RVSP: 19 MMHG
SINUS: 2.77 CM
SODIUM SERPL-SCNC: 136 MMOL/L (ref 136–145)
SODIUM SERPL-SCNC: 136 MMOL/L (ref 136–145)
SODIUM SERPL-SCNC: 137 MMOL/L (ref 136–145)
STJ: 2.36 CM
TDI LATERAL: 0.14 M/S
TDI SEPTAL: 0.08 M/S
TDI: 0.11 M/S
TR MAX PG: 66 MMHG
TRICUSPID ANNULAR PLANE SYSTOLIC EXCURSION: 1.77 CM
TV REST PULMONARY ARTERY PRESSURE: 81 MMHG
WBC # BLD AUTO: 7.19 K/UL (ref 3.9–12.7)
Z-SCORE OF LEFT VENTRICULAR DIMENSION IN END DIASTOLE: -3.1
Z-SCORE OF LEFT VENTRICULAR DIMENSION IN END SYSTOLE: -0.46

## 2024-07-05 PROCEDURE — 85610 PROTHROMBIN TIME: CPT | Performed by: INTERNAL MEDICINE

## 2024-07-05 PROCEDURE — 20000000 HC ICU ROOM

## 2024-07-05 PROCEDURE — 27100171 HC OXYGEN HIGH FLOW UP TO 24 HOURS

## 2024-07-05 PROCEDURE — 83605 ASSAY OF LACTIC ACID: CPT | Performed by: STUDENT IN AN ORGANIZED HEALTH CARE EDUCATION/TRAINING PROGRAM

## 2024-07-05 PROCEDURE — 85025 COMPLETE CBC W/AUTO DIFF WBC: CPT | Performed by: STUDENT IN AN ORGANIZED HEALTH CARE EDUCATION/TRAINING PROGRAM

## 2024-07-05 PROCEDURE — 94640 AIRWAY INHALATION TREATMENT: CPT

## 2024-07-05 PROCEDURE — 93005 ELECTROCARDIOGRAM TRACING: CPT

## 2024-07-05 PROCEDURE — 83735 ASSAY OF MAGNESIUM: CPT | Performed by: STUDENT IN AN ORGANIZED HEALTH CARE EDUCATION/TRAINING PROGRAM

## 2024-07-05 PROCEDURE — 99900035 HC TECH TIME PER 15 MIN (STAT)

## 2024-07-05 PROCEDURE — 83735 ASSAY OF MAGNESIUM: CPT | Mod: 91 | Performed by: STUDENT IN AN ORGANIZED HEALTH CARE EDUCATION/TRAINING PROGRAM

## 2024-07-05 PROCEDURE — 25000242 PHARM REV CODE 250 ALT 637 W/ HCPCS: Performed by: STUDENT IN AN ORGANIZED HEALTH CARE EDUCATION/TRAINING PROGRAM

## 2024-07-05 PROCEDURE — 93010 ELECTROCARDIOGRAM REPORT: CPT | Mod: ,,, | Performed by: INTERNAL MEDICINE

## 2024-07-05 PROCEDURE — 25000003 PHARM REV CODE 250

## 2024-07-05 PROCEDURE — 94761 N-INVAS EAR/PLS OXIMETRY MLT: CPT

## 2024-07-05 PROCEDURE — 99232 SBSQ HOSP IP/OBS MODERATE 35: CPT | Mod: GC,,, | Performed by: INTERNAL MEDICINE

## 2024-07-05 PROCEDURE — 25000003 PHARM REV CODE 250: Performed by: STUDENT IN AN ORGANIZED HEALTH CARE EDUCATION/TRAINING PROGRAM

## 2024-07-05 PROCEDURE — 63600175 PHARM REV CODE 636 W HCPCS: Performed by: STUDENT IN AN ORGANIZED HEALTH CARE EDUCATION/TRAINING PROGRAM

## 2024-07-05 PROCEDURE — 94799 UNLISTED PULMONARY SVC/PX: CPT

## 2024-07-05 PROCEDURE — 84100 ASSAY OF PHOSPHORUS: CPT | Performed by: STUDENT IN AN ORGANIZED HEALTH CARE EDUCATION/TRAINING PROGRAM

## 2024-07-05 PROCEDURE — 80053 COMPREHEN METABOLIC PANEL: CPT | Performed by: STUDENT IN AN ORGANIZED HEALTH CARE EDUCATION/TRAINING PROGRAM

## 2024-07-05 PROCEDURE — 99223 1ST HOSP IP/OBS HIGH 75: CPT | Mod: ,,, | Performed by: CLINICAL NURSE SPECIALIST

## 2024-07-05 PROCEDURE — 80053 COMPREHEN METABOLIC PANEL: CPT | Mod: 91 | Performed by: STUDENT IN AN ORGANIZED HEALTH CARE EDUCATION/TRAINING PROGRAM

## 2024-07-05 PROCEDURE — 80048 BASIC METABOLIC PNL TOTAL CA: CPT | Mod: XB

## 2024-07-05 RX ORDER — POTASSIUM CHLORIDE 20 MEQ/1
40 TABLET, EXTENDED RELEASE ORAL ONCE
Status: COMPLETED | OUTPATIENT
Start: 2024-07-05 | End: 2024-07-05

## 2024-07-05 RX ORDER — POTASSIUM CHLORIDE 750 MG/1
30 CAPSULE, EXTENDED RELEASE ORAL ONCE
Status: COMPLETED | OUTPATIENT
Start: 2024-07-05 | End: 2024-07-05

## 2024-07-05 RX ORDER — ACETAMINOPHEN 325 MG/1
650 TABLET ORAL EVERY 6 HOURS PRN
Status: DISCONTINUED | OUTPATIENT
Start: 2024-07-05 | End: 2024-07-29 | Stop reason: HOSPADM

## 2024-07-05 RX ORDER — POTASSIUM CHLORIDE 20 MEQ/1
40 TABLET, EXTENDED RELEASE ORAL EVERY 4 HOURS
Status: COMPLETED | OUTPATIENT
Start: 2024-07-05 | End: 2024-07-05

## 2024-07-05 RX ADMIN — POTASSIUM CHLORIDE 10 MEQ: 750 CAPSULE, EXTENDED RELEASE ORAL at 09:07

## 2024-07-05 RX ADMIN — POTASSIUM CHLORIDE 40 MEQ: 1500 TABLET, EXTENDED RELEASE ORAL at 02:07

## 2024-07-05 RX ADMIN — FUROSEMIDE 20 MG/HR: 10 INJECTION, SOLUTION INTRAMUSCULAR; INTRAVENOUS at 09:07

## 2024-07-05 RX ADMIN — WARFARIN SODIUM 5 MG: 5 TABLET ORAL at 05:07

## 2024-07-05 RX ADMIN — PANTOPRAZOLE SODIUM 40 MG: 40 TABLET, DELAYED RELEASE ORAL at 09:07

## 2024-07-05 RX ADMIN — TIOTROPIUM BROMIDE INHALATION SPRAY 2 PUFF: 3.12 SPRAY, METERED RESPIRATORY (INHALATION) at 09:07

## 2024-07-05 RX ADMIN — POTASSIUM CHLORIDE 40 MEQ: 1500 TABLET, EXTENDED RELEASE ORAL at 05:07

## 2024-07-05 RX ADMIN — ACETAMINOPHEN 650 MG: 325 TABLET ORAL at 05:07

## 2024-07-05 RX ADMIN — POTASSIUM CHLORIDE 10 MEQ: 750 CAPSULE, EXTENDED RELEASE ORAL at 01:07

## 2024-07-05 RX ADMIN — POTASSIUM CHLORIDE 30 MEQ: 750 CAPSULE, EXTENDED RELEASE ORAL at 05:07

## 2024-07-05 RX ADMIN — POTASSIUM CHLORIDE 40 MEQ: 1500 TABLET, EXTENDED RELEASE ORAL at 03:07

## 2024-07-05 RX ADMIN — FUROSEMIDE 80 MG: 10 INJECTION, SOLUTION INTRAMUSCULAR; INTRAVENOUS at 12:07

## 2024-07-05 RX ADMIN — FLUTICASONE PROPIONATE AND SALMETEROL 1 PUFF: 50; 250 POWDER RESPIRATORY (INHALATION) at 09:07

## 2024-07-05 RX ADMIN — ALLOPURINOL 300 MG: 100 TABLET ORAL at 09:07

## 2024-07-05 RX ADMIN — FUROSEMIDE 20 MG/HR: 10 INJECTION, SOLUTION INTRAMUSCULAR; INTRAVENOUS at 12:07

## 2024-07-05 RX ADMIN — FLUTICASONE PROPIONATE AND SALMETEROL 1 PUFF: 50; 250 POWDER RESPIRATORY (INHALATION) at 08:07

## 2024-07-05 NOTE — ASSESSMENT & PLAN NOTE
WHO group 2-3 per his managing pulmonologist (Danyell at [facility])    Adherent with diet, CPAP, and on continuous O2 3L at home.   In spite of latter, he continues to be polycythemic.

## 2024-07-05 NOTE — PLAN OF CARE
[facility] - Cardiac Intensive Care  Initial Discharge Assessment       Primary Care Provider: Gianni Escalona MD    Admission Diagnosis: Shortness of breath [R06.02]  AARON (acute kidney injury) [N17.9]  Supratherapeutic INR [R79.1]  Acute decompensated heart failure [I50.9]  Congestive heart failure, unspecified HF chronicity, unspecified heart failure type [I50.9]    Admission Date: 7/4/2024  Expected Discharge Date: 7/8/2024    Transition of Care Barriers: None    Payor: HUMANA MANAGED MEDICARE / Plan: HUMANA MEDICARE PPO / Product Type: Medicare Advantage /     Extended Emergency Contact Information  Primary Emergency Contact: Alphonse Mcintyre  Address: [address] [place] [address] [place]  Mobile Phone: [phone]  Relation: Father  Secondary Emergency Contact: Bernardino Mcintyre  Address: [address]  Mobile Phone: [phone]  Relation: Mother    Discharge Plan A: Home  Discharge Plan B: [facility]      [id number] - [place], [place] - [address] [place] [address] AT [facility] & [address] [address]  Phone: [phone] Fax: [phone]    [facility] [address] [facility]  [address] [facility] [address]  Phone: [phone] Fax: [phone]    [facility]  [address] [facility] [address]  Phone: [phone] Fax: [phone]    [facility]  [address]  [facility] [address]  Phone: [phone] Fax: [phone]      Initial Assessment (most recent)       Adult Discharge Assessment - 07/05/24 1352          Discharge Assessment    Assessment Type Discharge Planning Assessment     Confirmed/corrected address, phone number and insurance Yes     Confirmed Demographics Correct on Facesheet     Source of Information patient;health record     Communicated ESTEBAN with  patient/caregiver Date not available/Unable to determine     Reason For Admission Right ventricular dysfunction     People in Home alone     Facility Arrived From: Home     Do you expect to return to your current living situation? Yes     Do you have help at home or someone to help you manage your care at home? Yes     Who are your caregiver(s) and their phone number(s)? Alphonse Mcintyre (father) [phone], Bernardino Mcintyre (mother) [phone]     Prior to hospitilization cognitive status: Alert/Oriented     Current cognitive status: Alert/Oriented     Walking or Climbing Stairs Difficulty no     Dressing/Bathing Difficulty no     Home Accessibility wheelchair accessible     Equipment Currently Used at Home none     Readmission within 30 days? No     Patient currently being followed by outpatient case management? No     Do you currently have service(s) that help you manage your care at home? No     Do you take prescription medications? Yes     Do you have prescription coverage? Yes     Coverage [employer]     Do you have any problems affording any of your prescribed medications? No     Is the patient taking medications as prescribed? yes     Who is going to help you get home at discharge? Self     How do you get to doctors appointments? car, drives self     Are you on dialysis? No     Do you take coumadin? Yes     Who monitors your labs? [facility] / Dr Escalona (PCP)     Discharge Plan A Home     Discharge Plan B Home Health     DME Needed Upon Discharge  none     Discharge Plan discussed with: Patient     Transition of Care Barriers None                   SW met with pt at bedside to discuss discharge planning.  Pt lives alone and is independent with ambulation and ADLs.  No DME, HH, or dialysis.  Pt takes coumadin and is monitored by the [facility]/Dr Escalona (PCP).  Pt will drive himself home at discharge but will have assistance from friends and family.  Pt stated that he does not like going to follow up  appts would prefer a virtual option if possible.  Discharge Plan A and Plan B have been determined by review of patient's clinical status, future medical and therapeutic needs, and coverage/benefits for post-acute care in coordination with multidisciplinary team members.  SW name and ext on whiteboard; discharge planning booklet provided.  Will continue to follow.       Elisa Olivo LMSW  [facility]  [id number]

## 2024-07-05 NOTE — ASSESSMENT & PLAN NOTE
WHO group 2-3 per his managing pulmonologist (Danyell at [facility])    Adherent with diet, CPAP, and on continuous O2 3L at home.   In spite of latter, he continues to be polycythemic.

## 2024-07-05 NOTE — ASSESSMENT & PLAN NOTE
WHO group 2-3 per his managing pulmonologist (Danyell at [facility])     Adherent with diet, CPAP, and on continuous O2 3L at home.   In spite of latter, he continues to be polycythemic.

## 2024-07-05 NOTE — ASSESSMENT & PLAN NOTE
Impression: Pt is a 49 y/o male with severe Pulmonary HTN.Pt is AAOx4. Pt is on high flow O2 35 L @ 45%.     Reason for consult: GOC/ACP. Communicated with Team.     GOC/ACP:     Met with pt who is sitting up in bed. Pt is aware he has pulmonary HTN and that he will continue to decline from disease. Per pt, he sees Dr. Child for his Pulmonary HTN.  Per pt his goal is to continue medical treatment for his PHTN.      Pt open to continue pal care f/u. Pt's reports he lives by himself and his parents are NOK. Father lives locally and mother lives in [place]. Per pt they are aware of his medical issues.   Per pt if unable to make his own medical decisions he wants his parents to make them.     Symptom management:     Dyspnea:   Pt on high flow O2 at 35 L 45%  Pt reports no dyspnea on O2.     Pt denies pain, anxiety.     Plan:   Pal care will continue to follow.

## 2024-07-05 NOTE — ASSESSMENT & PLAN NOTE
NYHA class III symptoms with recurrent admissions.  He was seen by [facility] outpatient 6/2024 who state patient was feeling better on new diuretic regimen however worried about the frequency of use of metolazone and his worsening kidney function.       CXR with cardiomegaly and pulmonary vascular congestion, suggestive of pulmonary edema secondary to CHF. BNP elevated at 800. Creatinine worse at 3.1  started on lasix gtt at 20/hr. Hold home metolazone and will assess UOP response to see if should restart metolazone  Ordered repeat echo     Given his recurrent HF admissions and most recent hemodynamics, [facility] believes his overall prognosis is poor and recommended palliative care consult. Inpatient consult has been placed.     Recommend 2 gram sodium restriction and 1500cc fluid restriction.     Encourage physical activity with graded exercise program.

## 2024-07-05 NOTE — CONSULTS
[facility] - Cardiac Intensive Care  Wound Care    Patient Name:  Yong Mcintyre   MRN:  [id number]  Date: 7/5/2024  Diagnosis: Right ventricular dysfunction    History:     Past Medical History:   Diagnosis Date    Arthritis     CHF (congestive heart failure)     Diastolic dysfunction    Cor pulmonale 11/05/2012    Gallstones     GERD (gastroesophageal reflux disease)     Hypertension     Morbid obesity 11/05/2012    Obesity hypoventilation syndrome     On home oxygen therapy 03/07/2014    MALLIKA (obstructive sleep apnea)     BPAP 16/11 with 3 L at night (continuous O2).    Paroxysmal atrial fibrillation     PFO (patent foramen ovale) 11/05/2012    status post closure    Pickwickian syndrome 11/05/2012    Pulmonary hypertension        Social History     Socioeconomic History    Marital status: Single   Tobacco Use    Smoking status: Never    Smokeless tobacco: Never   Substance and Sexual Activity    Alcohol use: Yes     Comment: holidays  rare    Drug use: No    Sexual activity: Not Currently     Partners: Female     Social Determinants of Health     Financial Resource Strain: Low Risk  (4/24/2024)    Overall Financial Resource Strain (CARDIA)     Difficulty of Paying Living Expenses: Not hard at all   Recent Concern: Financial Resource Strain - Medium Risk (4/7/2024)    Overall Financial Resource Strain (CARDIA)     Difficulty of Paying Living Expenses: Somewhat hard   Food Insecurity: No Food Insecurity (4/24/2024)    Hunger Vital Sign     Worried About Running Out of Food in the Last Year: Never true     Ran Out of Food in the Last Year: Never true   Recent Concern: Food Insecurity - Food Insecurity Present (4/7/2024)    Hunger Vital Sign     Worried About Running Out of Food in the Last Year: Sometimes true     Ran Out of Food in the Last Year: Never true   Transportation Needs: No Transportation Needs (4/24/2024)    PRAPARE - Transportation     Lack of Transportation (Medical): No     Lack of Transportation  "(Non-Medical): No   Physical Activity: Inactive (4/24/2024)    Exercise Vital Sign     Days of Exercise per Week: 0 days     Minutes of Exercise per Session: 0 min   Stress: No Stress Concern Present (4/24/2024)    [employer] - Occupational Stress Questionnaire     Feeling of Stress : Only a little   Recent Concern: Stress - Stress Concern Present (4/24/2024)    [employer] - Occupational Stress Questionnaire     Feeling of Stress : To some extent   Housing Stability: Low Risk  (4/24/2024)    Housing Stability Vital Sign     Unable to Pay for Housing in the Last Year: No     Homeless in the Last Year: No       Precautions:     Allergies as of 07/04/2024 - Reviewed 07/04/2024   Allergen Reaction Noted    Lipitor [atorvastatin] Other (See Comments) 09/19/2012       [facility] Assessment Details/Treatment   Patient seen for wound care consultation to [facility]  Reviewed chart for this encounter.   See Flow Sheet for findings.      Per chart review. Yong Mcintyre is a 48 y.o. male with severe pulmonary HTN (Group 2-3, on 3L home O2, diagnosed prior to 2015, follows with Dr. Child at [facility], MALLIKA, Obesity hypoventilation syndrome, HFpEF, Paroxysmal AFib (on Coumadin), BMI > 35, HTN who presents for worsening shortness of breath and lower extremity swelling over the past 2 weeks. This is his 5th admission this year. Wound care peeled Mepilex back and assessed pt. Wound care cleansed wound normal saline before reapplying Mepilex. Pt stated that the wound was caused by a blister that "popped." Pt lying on Regine InTouch. Primary RN updated at this time. Bed in lowest position and locked. Call Bell within reach.     RECOMMENDATIONS:  - Bedside nurse to perform wound care to right leg:  Cleanse wound with normal saline  Pat dry.  Cover with a foam border (Mepilex)   Apply this Weekly.    Recommendations made to primary team for above plan via secured chat. Wound Care to " follow up. Orders placed.       Nursing to continue care.  Nursing to maintain pressure injury prevention interventions.  Contact wound care for any further questions.         07/05/24 1100   WOCN Assessment   WOCN Total Time (mins) 30   Visit Date 07/05/24   Visit Time 1100   Consult Type New   WOCN Speciality Wound   Wound skin tear   Intervention assessed;chart review;orders   Teaching on-going        Wound 07/04/24 1915 Blister(s) Right anterior;lower Leg   Date First Assessed/Time First Assessed: 07/04/24 1915   Present on Original Admission: Yes  Primary Wound Type: Blister(s)  Side: Right  Orientation: anterior;lower  Location: Leg   Wound Image    Dressing Appearance Dry;Intact;Clean   Drainage Amount Small   Drainage Characteristics/Odor Serosanguineous   Appearance Pink;Moist   Tissue loss description Partial thickness   Periwound Area Intact;Dry   Wound Edges Defined   Wound Length (cm) 1 cm   Wound Width (cm) 2 cm   Wound Depth (cm) 0.2 cm   Wound Volume (cm^3) 0.4 cm^3   Wound Surface Area (cm^2) 2 cm^2   Care Cleansed with:;Sterile normal saline   Dressing Foam   Dressing Change Due 07/12/24 07/05/2024

## 2024-07-05 NOTE — CONSULTS
Pal care consulted for GOC during admit. Pt has sever pulmonary HTN. Communicated with Cardiology team. Per team consult on hold till they round and get back with pal care per Dr. Garcia.     Full consult to follow    Lisa GARCIA, APRN [facility]INT[facility]NS

## 2024-07-05 NOTE — ASSESSMENT & PLAN NOTE
NYHA class III symptoms with recurrent admissions.  He was seen by [facility] outpatient 6/2024 who state patient was feeling better on new diuretic regimen however worried about the frequency of use of metolazone and his worsening kidney function.      CXR with cardiomegaly and pulmonary vascular congestion, suggestive of pulmonary edema secondary to CHF. BNP elevated at 800. Creatinine worse at 3.1  started on lasix gtt at 20/hr. Hold home metolazone and will assess UOP response to see if should restart metolazone  Ordered repeat echo    Given his recurrent HF admissions and most recent hemodynamics, [facility] believes his overall prognosis is poor and recommended palliative care consult. Inpatient consult has been placed.    Recommend 2 gram sodium restriction and 1500cc fluid restriction.    Encourage physical activity with graded exercise program.

## 2024-07-05 NOTE — SUBJECTIVE & OBJECTIVE
"Past Medical History:   Diagnosis Date    Arthritis     CHF (congestive heart failure)     Diastolic dysfunction    Cor pulmonale 11/05/2012    Gallstones     GERD (gastroesophageal reflux disease)     Hypertension     Morbid obesity 11/05/2012    Obesity hypoventilation syndrome     On home oxygen therapy 03/07/2014    MALLIKA (obstructive sleep apnea)     BPAP 16/11 with 3 L at night (continuous O2).    Paroxysmal atrial fibrillation     PFO (patent foramen ovale) 11/05/2012    status post closure    Pickwickian syndrome 11/05/2012    Pulmonary hypertension        Past Surgical History:   Procedure Laterality Date    CHOLECYSTECTOMY      RIGHT HEART CATHETERIZATION Right 03/22/2022    Procedure: INSERTION, CATHETER, RIGHT HEART;  Surgeon: Tony Martínez MD;  Location: [facility] CATH LAB;  Service: Cardiology;  Laterality: Right;    RIGHT HEART CATHETERIZATION Right 01/31/2024    Procedure: INSERTION, CATHETER, RIGHT HEART;  Surgeon: Sheri Ritter MD;  Location: [facility] CATH LAB;  Service: Cardiology;  Laterality: Right;    UPPER GASTROINTESTINAL ENDOSCOPY      2-3 years ago       Review of patient's allergies indicates:   Allergen Reactions    Lipitor [atorvastatin] Other (See Comments)     "it makes my legs feel like jelly"  Other reaction(s): causes legs to hurt       No current facility-administered medications on file prior to encounter.     Current Outpatient Medications on File Prior to Encounter   Medication Sig    acetaminophen (TYLENOL ARTHRITIS ORAL) Take 2 tablets by mouth daily as needed (pain).    albuterol (VENTOLIN HFA) 90 mcg/actuation inhaler Inhale 2 puffs into the lungs every 4 (four) hours as needed for Wheezing. Rescue    allopurinoL (ZYLOPRIM) 300 MG tablet Take 1 tablet (300 mg total) by mouth once daily.    ascorbic acid (VITAMIN C ORAL) Take 1 tablet by mouth once daily.    b complex vitamins tablet Take 1 tablet by mouth once daily.    CALCIUM ORAL Take 1 capsule by mouth once daily.    metOLazone " (ZAROXOLYN) 2.5 MG tablet Take 1 tablet (2.5 mg total) by mouth every Mon, Wed, Fri, Sun. Take 2.5 mg 4 times a week. In addition, if you gain 3lbs in a day or 5 lbs in three days, take an extra dose of metolazone regardless of day of the week    multivit,calc,min/FA/K1/lycop (ONE-A-DAY MEN'S COMPLETE ORAL) Take 1 tablet by mouth once daily.    pantoprazole (PROTONIX) 40 MG tablet Take 1 tablet (40 mg total) by mouth once daily. (Patient taking differently: Take 40 mg by mouth every morning.)    potassium chloride (MICRO-K) 10 MEQ CpSR Take 10 mEq by mouth 2 (two) times daily. Take 2 capsules by mouth twice daily.    tiotropium (SPIRIVA) 18 mcg inhalation capsule Inhale 18 mcg into the lungs once daily.    torsemide (DEMADEX) 20 MG Tab Take 3 tablets (60 mg total) by mouth 2 (two) times a day.    warfarin (COUMADIN) 10 MG tablet Take 1/2 tablet (5 mg) by mouth daily or as directed by [facility] (Patient taking differently: Take 5 mg by mouth Daily. Take 1/2 Tablet by mouth daily Or as directed by the coumadin clinic.)    WIXELA INHUB 250-50 mcg/dose diskus inhaler 1 puff 2 (two) times daily. USE 1 INHALATION BY MOUTH TWICE DAILY    [DISCONTINUED] sildenafil (REVATIO) 20 mg Tab Take 1 tablet (20 mg total) by mouth 3 (three) times daily.     Family History       Problem Relation (Age of Onset)    Arthritis Mother, Maternal Grandmother, Maternal Grandfather    Asthma Mother, Sister, Maternal Grandmother    Breast cancer Paternal Grandmother    Diabetes Maternal Grandmother    Down syndrome Brother    Gout Brother    Hypertension Father, Maternal Grandmother, Maternal Grandfather, Paternal Grandfather          Tobacco Use    Smoking status: Never    Smokeless tobacco: Never   Substance and Sexual Activity    Alcohol use: Yes     Comment: holidays  rare    Drug use: No    Sexual activity: Not Currently     Partners: Female     Review of Systems   Constitutional: Positive for weight gain. Negative for fever.    Cardiovascular:  Positive for leg swelling. Negative for near-syncope, palpitations and syncope.   Respiratory:  Positive for shortness of breath.    All other systems reviewed and are negative.    Objective:     Vital Signs (Most Recent):  Temp: 98.1 °F (36.7 °C) (07/05/24 0001)  Pulse: 100 (07/05/24 0001)  Resp: 20 (07/04/24 2300)  BP: (!) 106/53 (07/05/24 0001)  SpO2: (!) 93 % (07/05/24 0001) Vital Signs (24h Range):  Temp:  [97.7 °F (36.5 °C)-98.2 °F (36.8 °C)] 98.1 °F (36.7 °C)  Pulse:  [100-113] 100  Resp:  [14-29] 20  SpO2:  [80 %-100 %] 93 %  BP: ()/(51-63) 106/53     Weight: 111.1 kg (245 lb)  Body mass index is 40.77 kg/m².    SpO2: (!) 93 %         Intake/Output Summary (Last 24 hours) at 7/5/2024 0024  Last data filed at 7/4/2024 2312  Gross per 24 hour   Intake --   Output 1000 ml   Net -1000 ml       Lines/Drains/Airways       Peripheral Intravenous Line  Duration                  Peripheral IV - Single Lumen 07/04/24 1826 20 G Posterior;Right Hand <1 day         Peripheral IV - Single Lumen 07/04/24 2353 20 G Left Antecubital <1 day                     Physical Exam  Vitals and nursing note reviewed.   Constitutional:       Appearance: Normal appearance.   Cardiovascular:      Rate and Rhythm: Regular rhythm. Tachycardia present.   Pulmonary:      Effort: Respiratory distress present.      Breath sounds: Rhonchi present.   Abdominal:      General: There is distension.   Musculoskeletal:      Right lower leg: Edema present.      Left lower leg: Edema present.   Skin:     General: Skin is warm.   Neurological:      Mental Status: He is alert.          Significant Labs: All pertinent lab results from the last 24 hours have been reviewed.

## 2024-07-05 NOTE — CONSULTS
[facility]  Palliative Medicine  Consult Note    Patient Name: Yong Mcintyre  MRN: [id number]  Admission Date: 7/4/2024  Hospital Length of Stay: 1 days  Code Status: Full Code   Attending Provider: Fermin Davis MD  Consulting Provider: WINDY Rodriguez  Primary Care Physician: Gianni Escalona MD  Principal Problem:Right ventricular dysfunction    Patient information was obtained from patient and primary team.      Consults  Assessment/Plan:     Palliative Care  Palliative care encounter  Impression: Pt is a 47 y/o male with severe Pulmonary HTN.Pt is AAOx4. Pt is on high flow O2 35 L @ 45%.     Reason for consult: GOC/ACP. Communicated with Team.     GOC/ACP:     Met with pt who is sitting up in bed. Pt is aware he has pulmonary HTN and that he will continue to decline from disease. Per pt, he sees Dr. Child for his Pulmonary HTN.  Per pt his goal is to continue medical treatment for his PHTN.      Pt open to continue pal care f/u. Pt's reports he lives by himself and his parents are NOK. Father lives locally and mother lives in [place]. Per pt they are aware of his medical issues.   Per pt if unable to make his own medical decisions he wants his parents to make them.     Symptom management:     Dyspnea:   Pt on high flow O2 at 35 L 45%  Pt reports no dyspnea on O2.     Pt denies pain, anxiety.     Plan:   Pal care will continue to follow.               Thank you for your consult. I will follow-up with patient. Please contact us if you have any additional questions.    Subjective:     HPI:   Pt is a 48 y.o. male with severe pulmonary HTN (Group 2-3, on 3L home O2, diagnosed prior to 2015, follows with Dr. Child at [facility], MALLIKA, Obesity hypoventilation syndrome, HFpEF, Paroxysmal AFib (on Coumadin), BMI > 35, HTN who presents for worsening shortness of breath and lower extremity swelling over the past 2 weeks. This is his 5th admission this year. He reports compliance  "with his medications. His diuretic regimen was adjusted to the following; Torsemide 60 mg twice daily and metolazone 2.5 on M/WF/Sunday. Clinically reports NYHA class III symptoms. He reports his dry weight is 223lbs and is currently weighing in at 245lbs. He uses 3L of O2 at home and is currently on high flow 35L at 45%     Hospital Course:  No notes on file    Interval History: Pt has severe Pulmonary HTN    Past Medical History:   Diagnosis Date    Arthritis     CHF (congestive heart failure)     Diastolic dysfunction    Cor pulmonale 11/05/2012    Gallstones     GERD (gastroesophageal reflux disease)     Hypertension     Morbid obesity 11/05/2012    Obesity hypoventilation syndrome     On home oxygen therapy 03/07/2014    MALLIKA (obstructive sleep apnea)     BPAP 16/11 with 3 L at night (continuous O2).    Paroxysmal atrial fibrillation     PFO (patent foramen ovale) 11/05/2012    status post closure    Pickwickian syndrome 11/05/2012    Pulmonary hypertension        Past Surgical History:   Procedure Laterality Date    CHOLECYSTECTOMY      RIGHT HEART CATHETERIZATION Right 03/22/2022    Procedure: INSERTION, CATHETER, RIGHT HEART;  Surgeon: Tony Martínez MD;  Location: [facility] CATH LAB;  Service: Cardiology;  Laterality: Right;    RIGHT HEART CATHETERIZATION Right 01/31/2024    Procedure: INSERTION, CATHETER, RIGHT HEART;  Surgeon: Sheri Ritter MD;  Location: [facility] CATH LAB;  Service: Cardiology;  Laterality: Right;    UPPER GASTROINTESTINAL ENDOSCOPY      2-3 years ago       Review of patient's allergies indicates:   Allergen Reactions    Lipitor [atorvastatin] Other (See Comments)     "it makes my legs feel like jelly"  Other reaction(s): causes legs to hurt       Medications:  Continuous Infusions:   furosemide (Lasix) 500 mg in 50 mL infusion (conc: 10 mg/mL)  20 mg/hr Intravenous Continuous 2 mL/hr at 07/05/24 1405 20 mg/hr at 07/05/24 1405     Scheduled Meds:   allopurinoL  300 mg Oral Daily    " fluticasone-salmeterol 250-50 mcg/dose  1 puff Inhalation BID    pantoprazole  40 mg Oral Daily    potassium chloride  40 mEq Oral Q4H    tiotropium bromide  2 puff Inhalation Daily    warfarin  5 mg Oral Once per day on Sunday Monday Tuesday Wednesday Friday Saturday     PRN Meds:  Current Facility-Administered Medications:     albuterol, 2 puff, Inhalation, Q4H PRN    sodium chloride 0.9%, 10 mL, Intravenous, PRN    Family History       Problem Relation (Age of Onset)    Arthritis Mother, Maternal Grandmother, Maternal Grandfather    Asthma Mother, Sister, Maternal Grandmother    Breast cancer Paternal Grandmother    Diabetes Maternal Grandmother    Down syndrome Brother    Gout Brother    Hypertension Father, Maternal Grandmother, Maternal Grandfather, Paternal Grandfather          Tobacco Use    Smoking status: Never    Smokeless tobacco: Never   Substance and Sexual Activity    Alcohol use: Yes     Comment: holidays  rare    Drug use: No    Sexual activity: Not Currently     Partners: Female       Review of Systems   Respiratory:  Positive for shortness of breath.      Objective:     Vital Signs (Most Recent):  Temp: 98.1 °F (36.7 °C) (07/05/24 1105)  Pulse: 109 (07/05/24 1405)  Resp: 18 (07/05/24 1405)  BP: 110/63 (07/05/24 1405)  SpO2: (!) 92 % (07/05/24 1405) Vital Signs (24h Range):  Temp:  [97.7 °F (36.5 °C)-98.2 °F (36.8 °C)] 98.1 °F (36.7 °C)  Pulse:  [] 109  Resp:  [12-35] 18  SpO2:  [80 %-100 %] 92 %  BP: ()/(51-75) 110/63     Weight: 110 kg (242 lb 8.1 oz)  Body mass index is 40.36 kg/m².       Physical Exam  Constitutional:       Interventions: Nasal cannula in place.      Comments: Comfort flow on at 35 L 45 %   HENT:      Head: Normocephalic and atraumatic.   Cardiovascular:      Rate and Rhythm: Normal rate.   Pulmonary:      Effort: Pulmonary effort is normal. No respiratory distress.      Comments: Pt on comfort flow O@ per NC  Abdominal:      General: There is distension.   Skin:      General: Skin is warm and dry.   Neurological:      Mental Status: He is alert and oriented to person, place, and time.   Psychiatric:         Behavior: Behavior is cooperative.            Review of Symptoms      Symptom Assessment (ESAS 0-10 Scale)  Pain:  0  Dyspnea:  0  Anxiety:  0  Nausea:  0  Depression:  0  Anorexia:  0  Fatigue:  0  Insomnia:  0  Restlessness:  0  Agitation:  0         Performance Status:  70    Living Arrangements:  Lives alone    Psychosocial/Cultural:   See Palliative Psychosocial Note: No  Pt lives alone, no adult children. Pt's Dad lives in [facility] and pt's mother lives in [place]. Per pt, they are both aware of his medical issues they get along.   **Primary  to Follow**  Palliative Care  Consult: Yes        Advance Care Planning  Advance Care Planning       Significant Labs: All pertinent labs within the past 24 hours have been reviewed.  CBC:   Recent Labs   Lab 07/05/24  0349   WBC 7.19   HGB 15.8   HCT 54.5*   MCV 85        BMP:  Recent Labs   Lab 07/05/24  0349 07/05/24  0803    95    137   K 3.6 3.4*   CL 89* 88*   CO2 34* 38*   * 98*   CREATININE 2.8* 2.6*   CALCIUM 10.1 9.9   MG 2.4  --      LFT:  Lab Results   Component Value Date    AST 23 07/05/2024    ALKPHOS 114 07/05/2024    BILITOT 1.4 (H) 07/05/2024     Albumin:   Albumin   Date Value Ref Range Status   07/05/2024 3.0 (L) 3.5 - 5.2 g/dL Final     Protein:   Total Protein   Date Value Ref Range Status   07/05/2024 8.0 6.0 - 8.4 g/dL Final     Lactic acid:   Lab Results   Component Value Date    LACTATE 1.1 07/05/2024    LACTATE 1.1 04/22/2024       Significant Imaging: I have reviewed all pertinent imaging results/findings within the past 24 hours.      I spent a total of 75 minutes on the day of the visit. This includes face to face time in discussion of goals of care, symptom assessment, coordination of care and emotional support.  This also includes non-face to face  time preparing to see the patient (eg, review of tests/imaging), obtaining and/or reviewing separately obtained history, documenting clinical information in the electronic or other health record, independently interpreting results and communicating results to the patient/family/caregiver, or care coordinator.    Lisa Brito, CNS  Palliative Medicine  [facility] - Card[facility] Int[facility]

## 2024-07-05 NOTE — PLAN OF CARE
Problem: Adult Inpatient Plan of Care  Goal: Plan of Care Review  7/5/2024 1743 by Danuta Santana RN  Outcome: Progressing  7/5/2024 1742 by Danuta Santana RN  Outcome: Progressing  Goal: Patient-Specific Goal (Individualized)  7/5/2024 1743 by Danuta Santana RN  Outcome: Progressing  7/5/2024 1742 by Danuta Santana RN  Outcome: Progressing  Goal: Absence of Hospital-Acquired Illness or Injury  7/5/2024 1743 by Danuta Santana RN  Outcome: Progressing  7/5/2024 1742 by Danuta Santana RN  Outcome: Progressing  Goal: Optimal Comfort and Wellbeing  7/5/2024 1743 by Danuta Santana RN  Outcome: Progressing  7/5/2024 1742 by Danuta Santana RN  Outcome: Progressing  Goal: Readiness for Transition of Care  7/5/2024 1743 by Danuta Santana RN  Outcome: Progressing  7/5/2024 1742 by Danuta Santana RN  Outcome: Progressing     Problem: Bariatric Environmental Safety  Goal: Safety Maintained with Care  7/5/2024 1743 by Danuta Santana RN  Outcome: Progressing  7/5/2024 1742 by Danuta Santana RN  Outcome: Progressing     Problem: Coping Ineffective  Goal: Effective Coping  7/5/2024 1743 by Danuta Santana RN  Outcome: Progressing  7/5/2024 1742 by Danuta Santana RN  Outcome: Progressing     Problem: Wound  Goal: Optimal Coping  7/5/2024 1743 by Danuta Santana RN  Outcome: Progressing  7/5/2024 1742 by Danuta Santana RN  Outcome: Progressing  Goal: Optimal Functional Ability  7/5/2024 1743 by Danuta Santana RN  Outcome: Progressing  7/5/2024 1742 by Danuta Santana RN  Outcome: Progressing  Goal: Absence of Infection Signs and Symptoms  7/5/2024 1743 by Danuta Santana RN  Outcome: Progressing  7/5/2024 1742 by Danuta Santana RN  Outcome: Progressing  Goal: Improved Oral Intake  Outcome: Progressing  Goal: Optimal Pain Control and Function  7/5/2024 1743 by Danuta Santana RN  Outcome: Progressing  7/5/20245/2024 1742 by Danuta Santana, RN  Outcome: Progressing  Goal: Skin Health and Integrity  7/5/2024 1743 by Max,  ADRIEL Tipton  Outcome: Progressing  7/5/2024 1742 by Danuta Santana RN  Outcome: Progressing  Goal: Optimal Wound Healing  7/5/2024 1743 by Danuta Santana RN  Outcome: Progressing  7/5/2024 1742 by Danuta Santana RN  Outcome: Progressing     Problem: Fall Injury Risk  Goal: Absence of Fall and Fall-Related Injury  7/5/2024 1743 by Danuta Santana RN  Outcome: Progressing  7/5/2024 1742 by Danuta Santana RN  Outcome: Progressing     Problem: Skin Injury Risk Increased  Goal: Skin Health and Integrity  7/5/2024 1743 by Danuta Santana RN  Outcome: Progressing  7/5/2024 1742 by Danuta Santana RN  Outcome: Progressing     Problem: Gas Exchange Impaired  Goal: Optimal Gas Exchange  Outcome: Progressing

## 2024-07-05 NOTE — PROGRESS NOTES
[facility] - Cardiac Intensive [facility]  Cardiology  Progress Note    Patient Name: Yong Mcintyre  MRN: [id number]  Admission Date: 7/4/2024  Hospital Length of Stay: 1 days  Code Status: Full Code   Attending Physician: Fermin Davis MD   Primary Care Physician: Gianni Escalona MD  Expected Discharge Date: 7/8/2024  Principal Problem:Right ventricular dysfunction    Subjective:     Hospital Course:   The patient was admitted to the CCU for further management of right-sided heart failure. He was diuresed with a lasix gtt. Electrolytes were monitored and repleted as indicated. Palliative care was consulted given his poor prognosis.    Interval History: No acute events. Continue diuresis, electrolyte repletion. Palliative consulted.    Review of Systems   Constitutional: Positive for weight gain. Negative for fever.   Cardiovascular:  Positive for leg swelling. Negative for near-syncope, palpitations and syncope.   Respiratory:  Positive for shortness of breath.    All other systems reviewed and are negative.    Objective:     Vital Signs (Most Recent):  Temp: 98.6 °F (37 °C) (07/05/24 1505)  Pulse: 105 (07/05/24 1605)  Resp: 16 (07/05/24 1605)  BP: 108/61 (07/05/24 1605)  SpO2: (!) 93 % (07/05/24 1605) Vital Signs (24h Range):  Temp:  [97.7 °F (36.5 °C)-98.6 °F (37 °C)] 98.6 °F (37 °C)  Pulse:  [] 105  Resp:  [12-35] 16  SpO2:  [80 %-100 %] 93 %  BP: ()/(51-75) 108/61     Weight: 110 kg (242 lb 8.1 oz)  Body mass index is 40.36 kg/m².     SpO2: (!) 93 %         Intake/Output Summary (Last 24 hours) at 7/5/2024 1627  Last data filed at 7/5/2024 1605  Gross per 24 hour   Intake 860.61 ml   Output 4850 ml   Net -3989.39 ml       Lines/Drains/Airways       Peripheral Intravenous Line  Duration                  Peripheral IV - Single Lumen 07/04/24 1826 20 G Posterior;Right Hand <1 day         Peripheral IV - Single Lumen 07/04/24 2353 20 G Left Antecubital <1 day                       Physical Exam  Vitals  and nursing note reviewed.   Constitutional:       Appearance: Normal appearance.   Cardiovascular:      Rate and Rhythm: Regular rhythm. Tachycardia present.   Pulmonary:      Effort: Respiratory distress present.      Breath sounds: Rhonchi present.   Abdominal:      General: There is distension.   Musculoskeletal:      Right lower leg: Edema present.      Left lower leg: Edema present.   Skin:     General: Skin is warm.   Neurological:      Mental Status: He is alert.       Significant Labs: BMP:   Recent Labs   Lab 07/04/24  1830 07/05/24  0026 07/05/24  0349 07/05/24  0803   * 116* 107 95   * 136 136 137   K 3.9 3.3* 3.6 3.4*   CL 88* 89* 89* 88*   CO2 30* 34* 34* 38*   * 101* 100* 98*   CREATININE 3.1* 3.0* 2.8* 2.6*   CALCIUM 9.7 9.9 10.1 9.9   MG 2.3 2.4 2.4  --    , CBC   Recent Labs   Lab 07/04/24 1830 07/04/24  1841 07/05/24  0349   WBC 6.56  --  7.19   HGB 15.4  --  15.8   HCT 55.1*   < > 54.5*     --  202    < > = values in this interval not displayed.   , and All pertinent lab results from the last 24 hours have been reviewed.    Assessment and Plan:       * Right ventricular dysfunction  NYHA class III symptoms with recurrent admissions.  He was seen by [facility] outpatient 6/2024 who state patient was feeling better on new diuretic regimen however worried about the frequency of use of metolazone and his worsening kidney function.       CXR with cardiomegaly and pulmonary vascular congestion, suggestive of pulmonary edema secondary to CHF. BNP elevated at 800. Creatinine worse at 3.1  started on lasix gtt at 20/hr. Hold home metolazone and will assess UOP response to see if should restart metolazone  Ordered repeat echo  Currently BP is stable, if decline or no improvement may consider, NE with Tenzin may help with RVF     Given his recurrent HF admissions and most recent hemodynamics, [facility] believes his overall prognosis is poor and recommended palliative care consult. Inpatient  consult has been placed.     Recommend 2 gram sodium restriction and 1500cc fluid restriction.     Encourage physical activity with graded exercise program.    Paroxysmal A-fib  On Coumadin     MALLIKA (obstructive sleep apnea)  BIPAP    Pulmonary hypertension  WHO group 2-3 per his managing pulmonologist (Danyell at [facility])     Adherent with diet, CPAP, and on continuous O2 3L at home. Currently requiring 40L High flow 70% O2  In spite of latter, he continues to be polycythemic.     Hypoventilation syndrome  BIPAP        VTE Risk Mitigation (From admission, onward)           Ordered     warfarin (COUMADIN) tablet 5 mg  Once per day on Sunday Monday Tuesday Wednesday Friday Saturday 07/04/24 2349     IP VTE HIGH RISK PATIENT  Once         07/04/24 2302     Place sequential compression device  Until discontinued         07/04/24 2302                    Vik White MD  Cardiology  [facility]

## 2024-07-05 NOTE — H&P
[facility] - [facility]  Heart Transplant  H&P    Patient Name: Yong Mcintyre  MRN: [id number]  Admission Date: 7/4/2024  Attending Physician: Rossy Leary MD  Primary Care Provider: Gianni Escalona MD  Principal Problem:Right ventricular dysfunction    Subjective:     History of Present Illness:  Yong Mcintyre is a 48 y.o. male with severe pulmonary HTN (Group 2-3, on 3L home O2, diagnosed prior to 2015, follows with Dr. Child at [facility], MALLIKA, Obesity hypoventilation syndrome, HFpEF, Paroxysmal AFib (on Coumadin), BMI > 35, HTN who presents for worsening shortness of breath and lower extremity swelling over the past 2 weeks. This is his 5th admission this year.  He reports compliance with his medications. His diuretic regimen was adjusted to the following; Torsemide 60 mg twice daily and metolazone 2.5 on M/WF/Sunday. Clinically reports NYHA class III symptoms. He reports his dry weight is 223lbs and is currently weighing in at 245lbs. He uses 3L of O2 at home and is currently on high flow 40L at 70%.         2D Echo with CFD done on 3/17/2024  Left Ventricle: The left ventricle is normal in size. Septal flattening in diastole consistent with right ventricular volume overload. There is normal systolic function. Ejection fraction by visual approximation is 60%.   Right Ventricle: Severe right ventricular enlargement. Systolic function is severely reduced. See text for details.   Right Atrium: Right atrium is severely dilated.   Tricuspid Valve: There is moderate to severe regurgitation.   Pulmonary Artery: There is severe pulmonary hypertension. The estimated pulmonary artery systolic pressure is 88 mmHg.   IVC/SVC: Elevated venous pressure at 15 mmHg.     RHC done on 1/31/2024  RA: 18/ 15/ 14 RV: 84/ 5/ 15 PA: 80/ 38/ 50 PWP: 19/ 18/ 15 .   Cardiac output was 3.74 by Atif. Cardiac index is 1.78 L/min/m2.       Thermodilution CO/CI 6.14/2.92 at rest  Likely reduced CO/CI based on Taif and  in setting of moderate to severe tricuspid regurgitation.   Moderately elevated right and mildly elevated left sided filling pressure.   Severe pulmonary hypertension in setting of normal to mildly elevated left sided filling pressure at rest.   TPG 35 PVR 10   SVR 1610 at rest based on Atif calculation   MAP 81    With exercise PA pressures increased to 92/50, mean 60  PCWP increased to 25/26, 24.   Blood pressure 130/87, mean 100.            No new subjective & objective note has been filed under this hospital service since the last note was generated.    Assessment/Plan:     * Right ventricular dysfunction  NYHA class III symptoms with recurrent admissions.  He was seen by [facility] outpatient 6/2024 who state patient was feeling better on new diuretic regimen however worried about the frequency of use of metolazone and his worsening kidney function.      CXR with cardiomegaly and pulmonary vascular congestion, suggestive of pulmonary edema secondary to CHF. BNP elevated at 800. Creatinine worse at 3.1  started on lasix gtt at 20/hr. Hold home metolazone and will assess UOP response to see if should restart metolazone  Ordered repeat echo    Given his recurrent HF admissions and most recent hemodynamics, [facility] believes his overall prognosis is poor and recommended palliative care consult. Inpatient consult has been placed.    Recommend 2 gram sodium restriction and 1500cc fluid restriction.    Encourage physical activity with graded exercise program.    Paroxysmal A-fib  On Coumadin    MALLIKA (obstructive sleep apnea)  BIPAP    Pulmonary hypertension  WHO group 2-3 per his managing pulmonologist (Danyell at [facility])    Adherent with diet, CPAP, and on continuous O2 3L at home.   In spite of latter, he continues to be polycythemic.         Urszula Ruby MD  Heart Transplant  [facility]

## 2024-07-05 NOTE — ASSESSMENT & PLAN NOTE
NYHA class III symptoms with recurrent admissions.  He was seen by \[facility]\"" outpatient 6/2024 who state patient was feeling better on new diuretic regimen however worried about the frequency of use of metolazone and his worsening kidney function.       CXR with cardiomegaly and pulmonary vascular congestion, suggestive of pulmonary edema secondary to CHF. BNP elevated at 800. Creatinine worse at 3.1  started on lasix gtt at 20/hr. Hold home metolazone and will assess UOP response to see if should restart metolazone  Ordered repeat echo  Currently BP is stable, if decline or no improvement may consider, NE with Tenzin may help with RVF     Given his recurrent HF admissions and most recent hemodynamics, \[facility]\"" believes his overall prognosis is poor and recommended palliative care consult. Inpatient consult has been placed.     Recommend 2 gram sodium restriction and 1500cc fluid restriction.     Encourage physical activity with graded exercise program.

## 2024-07-05 NOTE — SUBJECTIVE & OBJECTIVE
"Past Medical History:   Diagnosis Date    Arthritis     CHF (congestive heart failure)     Diastolic dysfunction    Cor pulmonale 11/05/2012    Gallstones     GERD (gastroesophageal reflux disease)     Hypertension     Morbid obesity 11/05/2012    Obesity hypoventilation syndrome     On home oxygen therapy 03/07/2014    MALLIKA (obstructive sleep apnea)     BPAP 16/11 with 3 L at night (continuous O2).    Paroxysmal atrial fibrillation     PFO (patent foramen ovale) 11/05/2012    status post closure    Pickwickian syndrome 11/05/2012    Pulmonary hypertension        Past Surgical History:   Procedure Laterality Date    CHOLECYSTECTOMY      RIGHT HEART CATHETERIZATION Right 03/22/2022    Procedure: INSERTION, CATHETER, RIGHT HEART;  Surgeon: Tony Martínez MD;  Location: [facility] CATH LAB;  Service: Cardiology;  Laterality: Right;    RIGHT HEART CATHETERIZATION Right 01/31/2024    Procedure: INSERTION, CATHETER, RIGHT HEART;  Surgeon: Sheri Ritter MD;  Location: [facility] CATH LAB;  Service: Cardiology;  Laterality: Right;    UPPER GASTROINTESTINAL ENDOSCOPY      2-3 years ago       Review of patient's allergies indicates:   Allergen Reactions    Lipitor [atorvastatin] Other (See Comments)     "it makes my legs feel like jelly"  Other reaction(s): causes legs to hurt       No current facility-administered medications on file prior to encounter.     Current Outpatient Medications on File Prior to Encounter   Medication Sig    acetaminophen (TYLENOL ARTHRITIS ORAL) Take 2 tablets by mouth daily as needed (pain).    albuterol (VENTOLIN HFA) 90 mcg/actuation inhaler Inhale 2 puffs into the lungs every 4 (four) hours as needed for Wheezing. Rescue    allopurinoL (ZYLOPRIM) 300 MG tablet Take 1 tablet (300 mg total) by mouth once daily.    ascorbic acid (VITAMIN C ORAL) Take 1 tablet by mouth once daily.    b complex vitamins tablet Take 1 tablet by mouth once daily.    CALCIUM ORAL Take 1 capsule by mouth once daily.    metOLazone " (ZAROXOLYN) 2.5 MG tablet Take 1 tablet (2.5 mg total) by mouth every Mon, Wed, Fri, Sun. Take 2.5 mg 4 times a week. In addition, if you gain 3lbs in a day or 5 lbs in three days, take an extra dose of metolazone regardless of day of the week    multivit,calc,min/FA/K1/lycop (ONE-A-DAY MEN'S COMPLETE ORAL) Take 1 tablet by mouth once daily.    pantoprazole (PROTONIX) 40 MG tablet Take 1 tablet (40 mg total) by mouth once daily. (Patient taking differently: Take 40 mg by mouth every morning.)    potassium chloride (MICRO-K) 10 MEQ CpSR Take 10 mEq by mouth 2 (two) times daily. Take 2 capsules by mouth twice daily.    tiotropium (SPIRIVA) 18 mcg inhalation capsule Inhale 18 mcg into the lungs once daily.    torsemide (DEMADEX) 20 MG Tab Take 3 tablets (60 mg total) by mouth 2 (two) times a day.    warfarin (COUMADIN) 10 MG tablet Take 1/2 tablet (5 mg) by mouth daily or as directed by [facility] (Patient taking differently: Take 5 mg by mouth Daily. Take 1/2 Tablet by mouth daily Or as directed by the coumadin clinic.)    WIXELA INHUB 250-50 mcg/dose diskus inhaler 1 puff 2 (two) times daily. USE 1 INHALATION BY MOUTH TWICE DAILY    [DISCONTINUED] sildenafil (REVATIO) 20 mg Tab Take 1 tablet (20 mg total) by mouth 3 (three) times daily.     Family History       Problem Relation (Age of Onset)    Arthritis Mother, Maternal Grandmother, Maternal Grandfather    Asthma Mother, Sister, Maternal Grandmother    Breast cancer Paternal Grandmother    Diabetes Maternal Grandmother    Down syndrome Brother    Gout Brother    Hypertension Father, Maternal Grandmother, Maternal Grandfather, Paternal Grandfather          Tobacco Use    Smoking status: Never    Smokeless tobacco: Never   Substance and Sexual Activity    Alcohol use: Yes     Comment: holidays  rare    Drug use: No    Sexual activity: Not Currently     Partners: Female     Review of Systems   Constitutional: Positive for weight gain. Negative for fever.    Cardiovascular:  Positive for leg swelling. Negative for near-syncope, palpitations and syncope.   Respiratory:  Positive for shortness of breath.    All other systems reviewed and are negative.    Objective:     Vital Signs (Most Recent):  Temp: 98.1 °F (36.7 °C) (07/05/24 0001)  Pulse: 100 (07/05/24 0001)  Resp: 20 (07/04/24 2300)  BP: (!) 106/53 (07/05/24 0001)  SpO2: (!) 93 % (07/05/24 0001) Vital Signs (24h Range):  Temp:  [97.7 °F (36.5 °C)-98.2 °F (36.8 °C)] 98.1 °F (36.7 °C)  Pulse:  [100-113] 100  Resp:  [14-29] 20  SpO2:  [80 %-100 %] 93 %  BP: ()/(51-63) 106/53     Weight: 111.1 kg (245 lb)  Body mass index is 40.77 kg/m².    SpO2: (!) 93 %         Intake/Output Summary (Last 24 hours) at 7/5/2024 0026  Last data filed at 7/4/2024 2312  Gross per 24 hour   Intake --   Output 1000 ml   Net -1000 ml       Lines/Drains/Airways       Peripheral Intravenous Line  Duration                  Peripheral IV - Single Lumen 07/04/24 1826 20 G Posterior;Right Hand <1 day         Peripheral IV - Single Lumen 07/04/24 2353 20 G Left Antecubital <1 day                     Physical Exam  Vitals and nursing note reviewed.   Constitutional:       Appearance: Normal appearance.   Cardiovascular:      Rate and Rhythm: Regular rhythm. Tachycardia present.   Pulmonary:      Effort: Respiratory distress present.      Breath sounds: Rhonchi present.   Abdominal:      General: There is distension.   Musculoskeletal:      Right lower leg: Edema present.      Left lower leg: Edema present.   Skin:     General: Skin is warm.   Neurological:      Mental Status: He is alert.          Significant Labs: All pertinent lab results from the last 24 hours have been reviewed.

## 2024-07-05 NOTE — ASSESSMENT & PLAN NOTE
WHO group 2-3 per his managing pulmonologist (Danyell at [facility])     Adherent with diet, CPAP, and on continuous O2 3L at home. Currently requiring 40L High flow 70% O2  In spite of latter, he continues to be polycythemic.

## 2024-07-05 NOTE — NURSING
CICU DAILY GOALS       A: Awake    RASS: Goal - RASS Goal: 0-->alert and calm  Actual - RASS (Carter Agitation-Sedation Scale): alert and calm   Restraint necessity:    B: Breath   SBT: NA   C: Coordinate A & B, analgesics/sedatives   Pain: managed    SAT: NA  D: Delirium   CAM-ICU: Overall CAM-ICU: Negative  E: Early(intubated/ Progressive (non-intubated) Mobility   MOVE Screen: Pass   Activity: Activity Management: Rolling - L1, Sitting at edge of bed - L2, Standing - L3  FAS: Feeding/Nutrition   Diet order: Diet/Nutrition Received: low saturated fat/low cholesterol, 2 gram sodium, restrict fluids,   Fluid restriction:     Nutritional Supplement Intake: Quantity NA, Type:  NA  T: Thrombus   DVT prophylaxis: VTE Required Core Measure: (SCDs) Sequential compression device initiated/maintained, Pharmacological prophylaxis initiated/maintained  H: HOB Elevation   Head of Bed (HOB) Positioning: HOB at 30-45 degrees  U: Ulcer Prophylaxis   GI: yes  G: Glucose control   NA       S: Skin   Nurses Note -- 4 Eyes  Skin assessed during: Daily Assessment   CHOOSE ONE:  [] No Altered Skin Integrity Present      []Prevention Measures Documented    [x] Yes- Altered Skin Integrity Present or Discovered     [] LDA Added if Not in Epic (Describe Wound)     [] New Altered Skin Integrity was Present on Admit and Documented in LDA     [] Wound Image Taken  Wound Care Consulted? Yes  Attending Nurse:  Danuta Natarajan RN/Staff Member:  ADRIEL Buckley    B: Bowel Function   diarrhea   I: Indwelling Catheters   Dunne necessity:     CVC necessity: No   IPAD offered: Not appropriate  D: De-escalation Antibx   No  Plan for the day   Wean O2 as pt tolerates  Family/Goals of care/Code Status   Code Status: Full Code     No acute events throughout day, VS and assessment per flow sheet, patient progressing towards goals as tolerated, plan of care reviewed with Yong Mcintyre and family, all concerns addressed, will continue to monitor.

## 2024-07-05 NOTE — HOSPITAL COURSE
The patient was admitted to the CCU for further management of right-sided heart failure. He was diuresed with a lasix gtt. Electrolytes were monitored and repleted as indicated. Palliative care was consulted given his poor prognosis. The patient had significant O2 requirements on admission which were slow to wean. A central line was placed for close CVP monitoring in the setting of aggressive diuresis. Nutrition consult placed for low salt diet education. Acetazolamide was added to his diuretic regimen due to persistent metabolic alkalosis. The patient required intermittent Bipap due to hypercapnic respiratory failure. Dobutamine was added to augment cardiac output to further diurese patient. The patient was in aflutter, EP was consulted. S/p cardioversion 7/16/24 with successful conversion to sinus rhythm. Pulm consulted for hypercapnia. Per pulm ok to remain on HFNC. Presentation concerning for end stage RA causing pum HTN, rheum consulted and recommendations appreciated. No therapies available, but rheum has ordered work up. Switched from IV to PO amio. Continued diamox. Patient to consider Palliative care. Palliative medicine consulted and patient expresses wishes for sister to be a part of discussions. The patient was trialed off  on 7/22, and failed.       Sister was reached and she stated that she would like her brother to come to [place]. The plan discussed was either we lower this patient's lasix and HFNC to an acceptable amount so he can be transported to [place] via helicopter or ambulance. If this plan is unsuccessful then the plan would be to discharge this patient to hospice to [place]. However the patient is not wanting to consider hospice. Continued discussions resulted in the sister requesting that the patient be transported to [place] as a transfer to a hospital in [place]. If the transfer request fails then alternative is that patient is transferred to LTAC in [place]. The transfer to [facility] [place] was denied by facility 2/2 to end stage disease and BMI. The patient was then started on the process to transfer to an LTAC in [place]. He was stable for step down to the floor while on HFNC.

## 2024-07-05 NOTE — ASSESSMENT & PLAN NOTE
NYHA class III symptoms with recurrent admissions.  He was seen by [facility] outpatient 6/2024 who state patient was feeling better on new diuretic regimen however worried about the frequency of use of metolazone and his worsening kidney function.      CXR with cardiomegaly and pulmonary vascular congestion, suggestive of pulmonary edema secondary to CHF. BNP elevated at 800. Creatinine worse at 3.1  started on lasix gtt at 20/hr. Hold home metolazone and will assess UOP response to see if should restart metolazone  Ordered repeat echo    Given his recurrent HF admissions and most recent hemodynamics, [facility] believes his overall prognosis is poor and recommended palliative care consult. Inpatient consult has been placed.    Recommend 2 gram sodium restriction and 1500cc fluid restriction.    Encourage physical activity with graded exercise program.

## 2024-07-05 NOTE — HPI
Yong Mcintyre is a 48 y.o. male with severe pulmonary HTN (Group 2-3, on 3L home O2, diagnosed prior to 2015, follows with Dr. Child at [facility], MALLIKA, Obesity hypoventilation syndrome, HFpEF, Paroxysmal AFib (on Coumadin), BMI > 35, HTN, RA, COPD who presented with c/o worsening shortness of breath and lower extremity swelling 3 weeks ago and was admitted to the CCU for further management of right-sided heart failure. Palliative care was consulted given his poor prognosis. The patient had significant O2 requirements on admission which were slow to wean. A central line was placed for close CVP monitoring in the setting of aggressive diuresis. The patient has required intermittent bipap 2/2 hypercapnic respiratory failure and dobutamine was added to augment cardiac output and improve diuresis. CCU attempted to discontinue dobutamine but patient had to be put back on. While hospitalized, on 7/14, patient developed atrial flutter and was started on amiodarone. He had a cardioversion procedure on 7/16 and NSR was restored.     This is his 5th admission this year - palliative care has been consulted given his poor prognosis.  He reports compliance with his medications. His diuretic regimen was adjusted to the following; Torsemide 60 mg twice daily and metolazone 2.5 on M/WF/Sunday. He reports his dry weight is 223lbs and is currently weighing in at 245lbs. He uses 3L of O2 at home and is currently on high flow 40L at 70%. Pt's current discharge plan is to an LTAC in [place].     Cardiology was consulted for assistance with management of dobutamine and furosemide drips.

## 2024-07-05 NOTE — SUBJECTIVE & OBJECTIVE
Interval History: No acute events. Continue diuresis, electrolyte repletion. Palliative consulted.    Review of Systems   Constitutional: Positive for weight gain. Negative for fever.   Cardiovascular:  Positive for leg swelling. Negative for near-syncope, palpitations and syncope.   Respiratory:  Positive for shortness of breath.    All other systems reviewed and are negative.    Objective:     Vital Signs (Most Recent):  Temp: 98.6 °F (37 °C) (07/05/24 1505)  Pulse: 105 (07/05/24 1605)  Resp: 16 (07/05/24 1605)  BP: 108/61 (07/05/24 1605)  SpO2: (!) 93 % (07/05/24 1605) Vital Signs (24h Range):  Temp:  [97.7 °F (36.5 °C)-98.6 °F (37 °C)] 98.6 °F (37 °C)  Pulse:  [] 105  Resp:  [12-35] 16  SpO2:  [80 %-100 %] 93 %  BP: ()/(51-75) 108/61     Weight: 110 kg (242 lb 8.1 oz)  Body mass index is 40.36 kg/m².     SpO2: (!) 93 %         Intake/Output Summary (Last 24 hours) at 7/5/2024 1627  Last data filed at 7/5/2024 1605  Gross per 24 hour   Intake 860.61 ml   Output 4850 ml   Net -3989.39 ml       Lines/Drains/Airways       Peripheral Intravenous Line  Duration                  Peripheral IV - Single Lumen 07/04/24 1826 20 G Posterior;Right Hand <1 day         Peripheral IV - Single Lumen 07/04/24 2353 20 G Left Antecubital <1 day                       Physical Exam  Vitals and nursing note reviewed.   Constitutional:       Appearance: Normal appearance.   Cardiovascular:      Rate and Rhythm: Regular rhythm. Tachycardia present.   Pulmonary:      Effort: Respiratory distress present.      Breath sounds: Rhonchi present.   Abdominal:      General: There is distension.   Musculoskeletal:      Right lower leg: Edema present.      Left lower leg: Edema present.   Skin:     General: Skin is warm.   Neurological:      Mental Status: He is alert.       Significant Labs: BMP:   Recent Labs   Lab 07/04/24  1830 07/05/24  0026 07/05/24  0349 07/05/24  0803   * 116* 107 95   * 136 136 137   K 3.9 3.3*  3.6 3.4*   CL 88* 89* 89* 88*   CO2 30* 34* 34* 38*   * 101* 100* 98*   CREATININE 3.1* 3.0* 2.8* 2.6*   CALCIUM 9.7 9.9 10.1 9.9   MG 2.3 2.4 2.4  --    , CBC   Recent Labs   Lab 07/04/24  1830 07/04/24  1841 07/05/24  0349   WBC 6.56  --  7.19   HGB 15.4  --  15.8   HCT 55.1*   < > 54.5*     --  202    < > = values in this interval not displayed.   , and All pertinent lab results from the last 24 hours have been reviewed.

## 2024-07-05 NOTE — SUBJECTIVE & OBJECTIVE
"Interval History: Pt has severe Pulmonary HTN    Past Medical History:   Diagnosis Date    Arthritis     CHF (congestive heart failure)     Diastolic dysfunction    Cor pulmonale 11/05/2012    Gallstones     GERD (gastroesophageal reflux disease)     Hypertension     Morbid obesity 11/05/2012    Obesity hypoventilation syndrome     On home oxygen therapy 03/07/2014    MALLIKA (obstructive sleep apnea)     BPAP 16/11 with 3 L at night (continuous O2).    Paroxysmal atrial fibrillation     PFO (patent foramen ovale) 11/05/2012    status post closure    Pickwickian syndrome 11/05/2012    Pulmonary hypertension        Past Surgical History:   Procedure Laterality Date    CHOLECYSTECTOMY      RIGHT HEART CATHETERIZATION Right 03/22/2022    Procedure: INSERTION, CATHETER, RIGHT HEART;  Surgeon: Tony Martínez MD;  Location: [facility] CATH LAB;  Service: Cardiology;  Laterality: Right;    RIGHT HEART CATHETERIZATION Right 01/31/2024    Procedure: INSERTION, CATHETER, RIGHT HEART;  Surgeon: Sheri Ritter MD;  Location: [facility] CATH LAB;  Service: Cardiology;  Laterality: Right;    UPPER GASTROINTESTINAL ENDOSCOPY      2-3 years ago       Review of patient's allergies indicates:   Allergen Reactions    Lipitor [atorvastatin] Other (See Comments)     "it makes my legs feel like jelly"  Other reaction(s): causes legs to hurt       Medications:  Continuous Infusions:   furosemide (Lasix) 500 mg in 50 mL infusion (conc: 10 mg/mL)  20 mg/hr Intravenous Continuous 2 mL/hr at 07/05/24 1405 20 mg/hr at 07/05/24 1405     Scheduled Meds:   allopurinoL  300 mg Oral Daily    fluticasone-salmeterol 250-50 mcg/dose  1 puff Inhalation BID    pantoprazole  40 mg Oral Daily    potassium chloride  40 mEq Oral Q4H    tiotropium bromide  2 puff Inhalation Daily    warfarin  5 mg Oral Once per day on Sunday Monday Tuesday Wednesday Friday Saturday     PRN Meds:  Current Facility-Administered Medications:     albuterol, 2 puff, Inhalation, Q4H PRN    " sodium chloride 0.9%, 10 mL, Intravenous, PRN    Family History       Problem Relation (Age of Onset)    Arthritis Mother, Maternal Grandmother, Maternal Grandfather    Asthma Mother, Sister, Maternal Grandmother    Breast cancer Paternal Grandmother    Diabetes Maternal Grandmother    Down syndrome Brother    Gout Brother    Hypertension Father, Maternal Grandmother, Maternal Grandfather, Paternal Grandfather          Tobacco Use    Smoking status: Never    Smokeless tobacco: Never   Substance and Sexual Activity    Alcohol use: Yes     Comment: holidays  rare    Drug use: No    Sexual activity: Not Currently     Partners: Female       Review of Systems   Respiratory:  Positive for shortness of breath.      Objective:     Vital Signs (Most Recent):  Temp: 98.1 °F (36.7 °C) (07/05/24 1105)  Pulse: 109 (07/05/24 1405)  Resp: 18 (07/05/24 1405)  BP: 110/63 (07/05/24 1405)  SpO2: (!) 92 % (07/05/24 1405) Vital Signs (24h Range):  Temp:  [97.7 °F (36.5 °C)-98.2 °F (36.8 °C)] 98.1 °F (36.7 °C)  Pulse:  [] 109  Resp:  [12-35] 18  SpO2:  [80 %-100 %] 92 %  BP: ()/(51-75) 110/63     Weight: 110 kg (242 lb 8.1 oz)  Body mass index is 40.36 kg/m².       Physical Exam  Constitutional:       Interventions: Nasal cannula in place.      Comments: Comfort flow on at 35 L 45 %   HENT:      Head: Normocephalic and atraumatic.   Cardiovascular:      Rate and Rhythm: Normal rate.   Pulmonary:      Effort: Pulmonary effort is normal. No respiratory distress.      Comments: Pt on comfort flow O@ per NC  Abdominal:      General: There is distension.   Skin:     General: Skin is warm and dry.   Neurological:      Mental Status: He is alert and oriented to person, place, and time.   Psychiatric:         Behavior: Behavior is cooperative.            Review of Symptoms      Symptom Assessment (ESAS 0-10 Scale)  Pain:  0  Dyspnea:  0  Anxiety:  0  Nausea:  0  Depression:  0  Anorexia:  0  Fatigue:  0  Insomnia:  0  Restlessness:   0  Agitation:  0         Performance Status:  70    Living Arrangements:  Lives alone    Psychosocial/Cultural:   See Palliative Psychosocial Note: No  Pt lives alone, no adult children. Pt's Dad lives in [facility] and pt's mother lives in [place]. Per pt, they are both aware of his medical issues they get along.   **Primary  to Follow**  Palliative Care  Consult: Yes        Advance Care Planning   Advance Care Planning       Significant Labs: All pertinent labs within the past 24 hours have been reviewed.  CBC:   Recent Labs   Lab 07/05/24  0349   WBC 7.19   HGB 15.8   HCT 54.5*   MCV 85        BMP:  Recent Labs   Lab 07/05/24  0349 07/05/24  0803    95    137   K 3.6 3.4*   CL 89* 88*   CO2 34* 38*   * 98*   CREATININE 2.8* 2.6*   CALCIUM 10.1 9.9   MG 2.4  --      LFT:  Lab Results   Component Value Date    AST 23 07/05/2024    ALKPHOS 114 07/05/2024    BILITOT 1.4 (H) 07/05/2024     Albumin:   Albumin   Date Value Ref Range Status   07/05/2024 3.0 (L) 3.5 - 5.2 g/dL Final     Protein:   Total Protein   Date Value Ref Range Status   07/05/2024 8.0 6.0 - 8.4 g/dL Final     Lactic acid:   Lab Results   Component Value Date    LACTATE 1.1 07/05/2024    LACTATE 1.1 04/22/2024       Significant Imaging: I have reviewed all pertinent imaging results/findings within the past 24 hours.

## 2024-07-05 NOTE — PLAN OF CARE
CICU DAILY GOALS     VS and assessment per flow sheet, patient progressing towards goals as tolerated, plan of care reviewed with Yong Mcintyre, all concerns addressed.     A: Awake    RASS: Goal -  0  Actual -  0    B: Breath   SBT: Not intubated   C: Coordinate A & B, analgesics/sedatives   Pain: managed    SAT: Not intubated  D: Delirium   CAM-ICU: Overall CAM-ICU: Negative  E: Early(intubated/ Progressive (non-intubated) Mobility   MOVE Screen: Fail   Activity: Activity Management: Arm raise - L1, Rolling - L1  FAS: Feeding/Nutrition   Diet order: Diet/Nutrition Received: sips of water,   Fluid restriction:      T: Thrombus   DVT prophylaxis: VTE Required Core Measure: Pharmacological prophylaxis initiated/maintained (coumadin)  H: HOB Elevation   Head of Bed (HOB) Positioning: HOB at 60 degrees  U: Ulcer Prophylaxis   GI: yes  G: Glucose control   managed      S: Skin   Nurses Note -- 4 Eyes  Skin assessed during: Admit   CHOOSE ONE:  [] No Altered Skin Integrity Present      []Prevention Measures Documented    [x] Yes- Altered Skin Integrity Present or Discovered     [] LDA Added if Not in Epic (Describe Wound)     [x] New Altered Skin Integrity was Present on Admit and Documented in LDA     [x] Wound Image Taken  Wound Care Consulted? Yes  Attending Nurse:  Lisa Natarajan RN/Staff Member:  ADRIEL Buckley    B: Bowel Function   diarrhea   I: Indwelling Catheters   Dunne necessity:     CVC necessity: No      Family/Goals of care/Code Status   Code Status: Full Code.      Cardiac ICU Care Plan    Neuro:  Geoff Coma Scale  Best Eye Response: 4-->(E4) spontaneous  Best Motor Response: 6-->(M6) obeys commands  Best Verbal Response: 5-->(V5) oriented  Geoff Coma Scale Score: 15  Assessment Qualifiers: patient not sedated/intubated  Pupil PERRLA: yes    24 hr Temp:  [97.7 °F (36.5 °C)-98.2 °F (36.8 °C)]      CV:  Rhythm: normal sinus rhythm, sinus tachycardia   DVT prophylaxis: VTE Required Core Measure:  Pharmacological prophylaxis initiated/maintained (coumadin)        Pulses  Right Radial Pulse: 1+ (weak)  Left Radial Pulse: 1+ (weak)  Right Dorsalis Pedis Pulse: 1+ (weak)  Left Dorsalis Pedis Pulse: 1+ (weak)    Resp:  Flow (L/min) (Oxygen Therapy): 50  Oxygen Concentration (%): 70    GI/:  GI prophylaxis: yes  Diet/Nutrition Received: sips of water  Last Bowel Movement: 07/05/24  Voiding Characteristics: voids spontaneously without difficulty   Intake/Output Summary (Last 24 hours) at 7/5/2024 0633  Last data filed at 7/5/2024 0601  Gross per 24 hour   Intake 258.77 ml   Output 2200 ml   Net -1941.23 ml            Labs/Accuchecks:  Recent Labs   Lab 07/04/24  1830 07/04/24  1841 07/05/24  0349   WBC 6.56  --  7.19   RBC 6.43*  --  6.44*   HGB 15.4  --  15.8   HCT 55.1* 57* 54.5*     --  202      Recent Labs   Lab 07/02/24  1438 07/04/24  1830   INR 2.8* 3.2*      Recent Labs     07/05/24  0349      K 3.6   CO2 34*   CL 89*   *   CREATININE 2.8*   ALKPHOS 114   ALT 13   AST 23   BILITOT 1.4*       Recent Labs   Lab 07/04/24  1830   TROPONINI 0.018      Recent Labs     07/04/24  1828   PH 7.474*   PCO2 52.9*   PO2 55   HCO3 38.9*   POCSATURATED 89   BE 15*       Electrolytes: Electrolytes replaced  Accuchecks: none    Gtts/LDAs:   furosemide (Lasix) 500 mg in 50 mL infusion (conc: 10 mg/mL)  20 mg/hr Intravenous Continuous 2 mL/hr at 07/05/24 0601 20 mg/hr at 07/05/24 0601       Lines/Drains/Airways       Peripheral Intravenous Line  Duration                  Peripheral IV - Single Lumen 07/04/24 1826 20 G Posterior;Right Hand <1 day         Peripheral IV - Single Lumen 07/04/24 2353 20 G Left Antecubital <1 day                    Skin/Wounds  Bathing/Skin Care: bath, complete;back care;dressed/undressed;electrode patches/site rotation (07/05/24 0101)  Wounds: Yes  Wound care consulted: Yes

## 2024-07-05 NOTE — HPI
Pt is a 48 y.o. male with severe pulmonary HTN (Group 2-3, on 3L home O2, diagnosed prior to 2015, follows with Dr. Child at [facility], MALLIKA, Obesity hypoventilation syndrome, HFpEF, Paroxysmal AFib (on Coumadin), BMI > 35, HTN who presents for worsening shortness of breath and lower extremity swelling over the past 2 weeks. This is his 5th admission this year. He reports compliance with his medications. His diuretic regimen was adjusted to the following; Torsemide 60 mg twice daily and metolazone 2.5 on M/WF/Sunday. Clinically reports NYHA class III symptoms. He reports his dry weight is 223lbs and is currently weighing in at 245lbs. He uses 3L of O2 at home and is currently on high flow 35L at 45%

## 2024-07-05 NOTE — EICU
Nurses Note -- 4 Eyes      7/4/2024   11:59 PM      Skin assessed during: Admit      [] No Altered Skin Integrity Present    []Prevention Measures Documented      [x] Yes- Altered Skin Integrity Present or Discovered   [x] LDA Added if Not in Epic (Describe Wound)   [x] New Altered Skin Integrity was Present on Admit and Documented in LDA   [x] Wound Image Taken (image taken in ED)    Wound Care Consulted? No    Attending Nurse:  Magalie Natarajan RN/Staff Member:     Ina

## 2024-07-05 NOTE — H&P
[facility] - Cardiac Intensive Care  Cardiology  History and Physical     Patient Name: Yong Mcintyre  MRN: [id number]  Admission Date: 7/4/2024  Code Status: Full Code   Attending Provider: Rossy Leary MD   Primary Care Physician: Gianni Escalona MD  Principal Problem:Right ventricular dysfunction    Patient information was obtained from patient, past medical records, and ER records.     Subjective:     Chief Complaint:  SOB     HPI:  Yong Mcintyre is a 48 y.o. male with severe pulmonary HTN (Group 2-3, on 3L home O2, diagnosed prior to 2015, follows with Dr. Child at [facility], MALLIKA, Obesity hypoventilation syndrome, HFpEF, Paroxysmal AFib (on Coumadin), BMI > 35, HTN who presents for worsening shortness of breath and lower extremity swelling over the past 2 weeks. This is his 5th admission this year.  He reports compliance with his medications. His diuretic regimen was adjusted to the following; Torsemide 60 mg twice daily and metolazone 2.5 on M/WF/Sunday. Clinically reports NYHA class III symptoms. He reports his dry weight is 223lbs and is currently weighing in at 245lbs. He uses 3L of O2 at home and is currently on high flow 40L at 70%.         2D Echo with CFD done on 3/17/2024  Left Ventricle: The left ventricle is normal in size. Septal flattening in diastole consistent with right ventricular volume overload. There is normal systolic function. Ejection fraction by visual approximation is 60%.   Right Ventricle: Severe right ventricular enlargement. Systolic function is severely reduced. See text for details.   Right Atrium: Right atrium is severely dilated.   Tricuspid Valve: There is moderate to severe regurgitation.   Pulmonary Artery: There is severe pulmonary hypertension. The estimated pulmonary artery systolic pressure is 88 mmHg.   IVC/SVC: Elevated venous pressure at 15 mmHg.     RHC done on 1/31/2024  RA: 18/ 15/ 14 RV: 84/ 5/ 15 PA: 80/ 38/ 50 PWP: 19/ 18/ 15 .   Cardiac output was  "3.74 by Atif. Cardiac index is 1.78 L/min/m2.       Thermodilution CO/CI 6.14/2.92 at rest  Likely reduced CO/CI based on Atif and in setting of moderate to severe tricuspid regurgitation.   Moderately elevated right and mildly elevated left sided filling pressure.   Severe pulmonary hypertension in setting of normal to mildly elevated left sided filling pressure at rest.   TPG 35 PVR 10   SVR 1610 at rest based on Atif calculation   MAP 81    With exercise PA pressures increased to 92/50, mean 60  PCWP increased to 25/26, 24.   Blood pressure 130/87, mean 100.         Past Medical History:   Diagnosis Date    Arthritis     CHF (congestive heart failure)     Diastolic dysfunction    Cor pulmonale 11/05/2012    Gallstones     GERD (gastroesophageal reflux disease)     Hypertension     Morbid obesity 11/05/2012    Obesity hypoventilation syndrome     On home oxygen therapy 03/07/2014    MALLIKA (obstructive sleep apnea)     BPAP 16/11 with 3 L at night (continuous O2).    Paroxysmal atrial fibrillation     PFO (patent foramen ovale) 11/05/2012    status post closure    Pickwickian syndrome 11/05/2012    Pulmonary hypertension        Past Surgical History:   Procedure Laterality Date    CHOLECYSTECTOMY      RIGHT HEART CATHETERIZATION Right 03/22/2022    Procedure: INSERTION, CATHETER, RIGHT HEART;  Surgeon: Tony Martínez MD;  Location: [facility] CATH LAB;  Service: Cardiology;  Laterality: Right;    RIGHT HEART CATHETERIZATION Right 01/31/2024    Procedure: INSERTION, CATHETER, RIGHT HEART;  Surgeon: Sheri Ritter MD;  Location: [facility] CATH LAB;  Service: Cardiology;  Laterality: Right;    UPPER GASTROINTESTINAL ENDOSCOPY      2-3 years ago       Review of patient's allergies indicates:   Allergen Reactions    Lipitor [atorvastatin] Other (See Comments)     "it makes my legs feel like jelly"  Other reaction(s): causes legs to hurt       No current facility-administered medications on file prior to encounter.     Current " Outpatient Medications on File Prior to Encounter   Medication Sig    acetaminophen (TYLENOL ARTHRITIS ORAL) Take 2 tablets by mouth daily as needed (pain).    albuterol (VENTOLIN HFA) 90 mcg/actuation inhaler Inhale 2 puffs into the lungs every 4 (four) hours as needed for Wheezing. Rescue    allopurinoL (ZYLOPRIM) 300 MG tablet Take 1 tablet (300 mg total) by mouth once daily.    ascorbic acid (VITAMIN C ORAL) Take 1 tablet by mouth once daily.    b complex vitamins tablet Take 1 tablet by mouth once daily.    CALCIUM ORAL Take 1 capsule by mouth once daily.    metOLazone (ZAROXOLYN) 2.5 MG tablet Take 1 tablet (2.5 mg total) by mouth every Mon, Wed, Fri, Sun. Take 2.5 mg 4 times a week. In addition, if you gain 3lbs in a day or 5 lbs in three days, take an extra dose of metolazone regardless of day of the week    multivit,calc,min/FA/K1/lycop (ONE-A-DAY MEN'S COMPLETE ORAL) Take 1 tablet by mouth once daily.    pantoprazole (PROTONIX) 40 MG tablet Take 1 tablet (40 mg total) by mouth once daily. (Patient taking differently: Take 40 mg by mouth every morning.)    potassium chloride (MICRO-K) 10 MEQ CpSR Take 10 mEq by mouth 2 (two) times daily. Take 2 capsules by mouth twice daily.    tiotropium (SPIRIVA) 18 mcg inhalation capsule Inhale 18 mcg into the lungs once daily.    torsemide (DEMADEX) 20 MG Tab Take 3 tablets (60 mg total) by mouth 2 (two) times a day.    warfarin (COUMADIN) 10 MG tablet Take 1/2 tablet (5 mg) by mouth daily or as directed by [facility] (Patient taking differently: Take 5 mg by mouth Daily. Take 1/2 Tablet by mouth daily Or as directed by the coumadin clinic.)    WIXELA INHUB 250-50 mcg/dose diskus inhaler 1 puff 2 (two) times daily. USE 1 INHALATION BY MOUTH TWICE DAILY    [DISCONTINUED] sildenafil (REVATIO) 20 mg Tab Take 1 tablet (20 mg total) by mouth 3 (three) times daily.     Family History       Problem Relation (Age of Onset)    Arthritis Mother, Maternal Grandmother,  Maternal Grandfather    Asthma Mother, Sister, Maternal Grandmother    Breast cancer Paternal Grandmother    Diabetes Maternal Grandmother    Down syndrome Brother    Gout Brother    Hypertension Father, Maternal Grandmother, Maternal Grandfather, Paternal Grandfather          Tobacco Use    Smoking status: Never    Smokeless tobacco: Never   Substance and Sexual Activity    Alcohol use: Yes     Comment: holidays  rare    Drug use: No    Sexual activity: Not Currently     Partners: Female     Review of Systems   Constitutional: Positive for weight gain. Negative for fever.   Cardiovascular:  Positive for leg swelling. Negative for near-syncope, palpitations and syncope.   Respiratory:  Positive for shortness of breath.    All other systems reviewed and are negative.    Objective:     Vital Signs (Most Recent):  Temp: 98.1 °F (36.7 °C) (07/05/24 0001)  Pulse: 100 (07/05/24 0001)  Resp: 20 (07/04/24 2300)  BP: (!) 106/53 (07/05/24 0001)  SpO2: (!) 93 % (07/05/24 0001) Vital Signs (24h Range):  Temp:  [97.7 °F (36.5 °C)-98.2 °F (36.8 °C)] 98.1 °F (36.7 °C)  Pulse:  [100-113] 100  Resp:  [14-29] 20  SpO2:  [80 %-100 %] 93 %  BP: ()/(51-63) 106/53     Weight: 111.1 kg (245 lb)  Body mass index is 40.77 kg/m².    SpO2: (!) 93 %         Intake/Output Summary (Last 24 hours) at 7/5/2024 0026  Last data filed at 7/4/2024 2312  Gross per 24 hour   Intake --   Output 1000 ml   Net -1000 ml       Lines/Drains/Airways       Peripheral Intravenous Line  Duration                  Peripheral IV - Single Lumen 07/04/24 1826 20 G Posterior;Right Hand <1 day         Peripheral IV - Single Lumen 07/04/24 2353 20 G Left Antecubital <1 day                     Physical Exam  Vitals and nursing note reviewed.   Constitutional:       Appearance: Normal appearance.   Cardiovascular:      Rate and Rhythm: Regular rhythm. Tachycardia present.   Pulmonary:      Effort: Respiratory distress present.      Breath sounds: Rhonchi present.    Abdominal:      General: There is distension.   Musculoskeletal:      Right lower leg: Edema present.      Left lower leg: Edema present.   Skin:     General: Skin is warm.   Neurological:      Mental Status: He is alert.          Significant Labs: All pertinent lab results from the last 24 hours have been reviewed.    Assessment and Plan:     * Right ventricular dysfunction  NYHA class III symptoms with recurrent admissions.  He was seen by [facility] outpatient 6/2024 who state patient was feeling better on new diuretic regimen however worried about the frequency of use of metolazone and his worsening kidney function.       CXR with cardiomegaly and pulmonary vascular congestion, suggestive of pulmonary edema secondary to CHF. BNP elevated at 800. Creatinine worse at 3.1  started on lasix gtt at 20/hr. Hold home metolazone and will assess UOP response to see if should restart metolazone  Ordered repeat echo  Currently BP is stable, if decline or no improvement may consider, NE with Tenzin may help with RVF     Given his recurrent HF admissions and most recent hemodynamics, [facility] believes his overall prognosis is poor and recommended palliative care consult. Inpatient consult has been placed.     Recommend 2 gram sodium restriction and 1500cc fluid restriction.     Encourage physical activity with graded exercise program.    Pulmonary hypertension  WHO group 2-3 per his managing pulmonologist (Danyell at [facility])     Adherent with diet, CPAP, and on continuous O2 3L at home. Currently requiring 40L High flow 70% O2  In spite of latter, he continues to be polycythemic.     Paroxysmal A-fib  On Coumadin     MALLIKA (obstructive sleep apnea)  BIPAP    Hypoventilation syndrome  BIPAP        VTE Risk Mitigation (From admission, onward)           Ordered     warfarin (COUMADIN) tablet 5 mg  Once per day on Sunday Monday Tuesday Wednesday Friday Saturday 07/04/24 2349     IP VTE HIGH RISK PATIENT  Once         07/04/24 3653      Place sequential compression device  Until discontinued         07/04/24 1612                    Urszula Ruby MD  Cardiology   [facility] - Cardiac Intensive Care

## 2024-07-05 NOTE — ED NOTES
Nurses Note -- 4 Eyes      7/4/2024   9:09 PM      Skin assessed during: Admit      [] No Altered Skin Integrity Present    []Prevention Measures Documented      [x] Yes- Altered Skin Integrity Present or Discovered   [x] LDA Added if Not in Epic (Describe Wound)   [x] New Altered Skin Integrity was Present on Admit and Documented in LDA   [x] Wound Image Taken    Wound Care Consulted? No    Attending Nurse:  Claudia Olivas RN     Second RN/Staff Member:  ADRIEL Beckham

## 2024-07-05 NOTE — HPI
Yong Mcintyre is a 48 y.o. male with severe pulmonary HTN (Group 2-3, on 3L home O2, diagnosed prior to 2015, follows with Dr. Child at [facility], MALLIKA, Obesity hypoventilation syndrome, HFpEF, Paroxysmal AFib (on Coumadin), BMI > 35, HTN who presents for worsening shortness of breath and lower extremity swelling over the past 2 weeks. This is his 5th admission this year.  He reports compliance with his medications. His diuretic regimen was adjusted to the following; Torsemide 60 mg twice daily and metolazone 2.5 on M/WF/Sunday. Clinically reports NYHA class III symptoms. He reports his dry weight is 223lbs and is currently weighing in at 245lbs. He uses 3L of O2 at home and is currently on high flow 40L at 70%.         2D Echo with CFD done on 3/17/2024  Left Ventricle: The left ventricle is normal in size. Septal flattening in diastole consistent with right ventricular volume overload. There is normal systolic function. Ejection fraction by visual approximation is 60%.   Right Ventricle: Severe right ventricular enlargement. Systolic function is severely reduced. See text for details.   Right Atrium: Right atrium is severely dilated.   Tricuspid Valve: There is moderate to severe regurgitation.   Pulmonary Artery: There is severe pulmonary hypertension. The estimated pulmonary artery systolic pressure is 88 mmHg.   IVC/SVC: Elevated venous pressure at 15 mmHg.     RHC done on 1/31/2024  RA: 18/ 15/ 14 RV: 84/ 5/ 15 PA: 80/ 38/ 50 PWP: 19/ 18/ 15 .   Cardiac output was 3.74 by Atif. Cardiac index is 1.78 L/min/m2.       Thermodilution CO/CI 6.14/2.92 at rest  Likely reduced CO/CI based on Atif and in setting of moderate to severe tricuspid regurgitation.   Moderately elevated right and mildly elevated left sided filling pressure.   Severe pulmonary hypertension in setting of normal to mildly elevated left sided filling pressure at rest.   TPG 35 PVR 10   SVR 1610 at rest based on Atif calculation   MAP 81    With  exercise PA pressures increased to 92/50, mean 60  PCWP increased to 25/26, 24.   Blood pressure 130/87, mean 100.

## 2024-07-06 LAB
ALBUMIN SERPL BCP-MCNC: 2.6 G/DL (ref 3.5–5.2)
ALP SERPL-CCNC: 102 U/L (ref 55–135)
ALT SERPL W/O P-5'-P-CCNC: 11 U/L (ref 10–44)
ANION GAP SERPL CALC-SCNC: 12 MMOL/L (ref 8–16)
ANION GAP SERPL CALC-SCNC: 9 MMOL/L (ref 8–16)
AST SERPL-CCNC: 19 U/L (ref 10–40)
BASOPHILS # BLD AUTO: 0.02 K/UL (ref 0–0.2)
BASOPHILS NFR BLD: 0.3 % (ref 0–1.9)
BILIRUB SERPL-MCNC: 1.2 MG/DL (ref 0.1–1)
BUN SERPL-MCNC: 86 MG/DL (ref 6–20)
BUN SERPL-MCNC: 91 MG/DL (ref 6–20)
CALCIUM SERPL-MCNC: 9 MG/DL (ref 8.7–10.5)
CALCIUM SERPL-MCNC: 9.4 MG/DL (ref 8.7–10.5)
CHLORIDE SERPL-SCNC: 89 MMOL/L (ref 95–110)
CHLORIDE SERPL-SCNC: 91 MMOL/L (ref 95–110)
CO2 SERPL-SCNC: 37 MMOL/L (ref 23–29)
CO2 SERPL-SCNC: 42 MMOL/L (ref 23–29)
CREAT SERPL-MCNC: 2 MG/DL (ref 0.5–1.4)
CREAT SERPL-MCNC: 2.1 MG/DL (ref 0.5–1.4)
DIFFERENTIAL METHOD BLD: ABNORMAL
EOSINOPHIL # BLD AUTO: 0 K/UL (ref 0–0.5)
EOSINOPHIL NFR BLD: 0.2 % (ref 0–8)
ERYTHROCYTE [DISTWIDTH] IN BLOOD BY AUTOMATED COUNT: 21.8 % (ref 11.5–14.5)
EST. GFR  (NO RACE VARIABLE): 38.1 ML/MIN/1.73 M^2
EST. GFR  (NO RACE VARIABLE): 40.4 ML/MIN/1.73 M^2
GLUCOSE SERPL-MCNC: 106 MG/DL (ref 70–110)
GLUCOSE SERPL-MCNC: 117 MG/DL (ref 70–110)
HCT VFR BLD AUTO: 52.7 % (ref 40–54)
HGB BLD-MCNC: 14.4 G/DL (ref 14–18)
IMM GRANULOCYTES # BLD AUTO: 0.01 K/UL (ref 0–0.04)
IMM GRANULOCYTES NFR BLD AUTO: 0.2 % (ref 0–0.5)
INR PPP: 2.3 (ref 0.8–1.2)
LYMPHOCYTES # BLD AUTO: 0.8 K/UL (ref 1–4.8)
LYMPHOCYTES NFR BLD: 12 % (ref 18–48)
MAGNESIUM SERPL-MCNC: 1.9 MG/DL (ref 1.6–2.6)
MCH RBC QN AUTO: 24 PG (ref 27–31)
MCHC RBC AUTO-ENTMCNC: 27.3 G/DL (ref 32–36)
MCV RBC AUTO: 88 FL (ref 82–98)
MONOCYTES # BLD AUTO: 0.6 K/UL (ref 0.3–1)
MONOCYTES NFR BLD: 8.3 % (ref 4–15)
NEUTROPHILS # BLD AUTO: 5.2 K/UL (ref 1.8–7.7)
NEUTROPHILS NFR BLD: 79 % (ref 38–73)
NRBC BLD-RTO: 0 /100 WBC
PHOSPHATE SERPL-MCNC: 4.5 MG/DL (ref 2.7–4.5)
PLATELET # BLD AUTO: 203 K/UL (ref 150–450)
PMV BLD AUTO: 10.1 FL (ref 9.2–12.9)
POTASSIUM SERPL-SCNC: 3.5 MMOL/L (ref 3.5–5.1)
POTASSIUM SERPL-SCNC: 3.8 MMOL/L (ref 3.5–5.1)
PROT SERPL-MCNC: 7 G/DL (ref 6–8.4)
PROTHROMBIN TIME: 24 SEC (ref 9–12.5)
RBC # BLD AUTO: 6 M/UL (ref 4.6–6.2)
SODIUM SERPL-SCNC: 140 MMOL/L (ref 136–145)
SODIUM SERPL-SCNC: 140 MMOL/L (ref 136–145)
WBC # BLD AUTO: 6.6 K/UL (ref 3.9–12.7)

## 2024-07-06 PROCEDURE — 94799 UNLISTED PULMONARY SVC/PX: CPT

## 2024-07-06 PROCEDURE — 63600175 PHARM REV CODE 636 W HCPCS

## 2024-07-06 PROCEDURE — 99900035 HC TECH TIME PER 15 MIN (STAT)

## 2024-07-06 PROCEDURE — 85025 COMPLETE CBC W/AUTO DIFF WBC: CPT

## 2024-07-06 PROCEDURE — 20000000 HC ICU ROOM

## 2024-07-06 PROCEDURE — 94761 N-INVAS EAR/PLS OXIMETRY MLT: CPT

## 2024-07-06 PROCEDURE — 27100171 HC OXYGEN HIGH FLOW UP TO 24 HOURS

## 2024-07-06 PROCEDURE — 25000003 PHARM REV CODE 250

## 2024-07-06 PROCEDURE — 94640 AIRWAY INHALATION TREATMENT: CPT

## 2024-07-06 PROCEDURE — 80048 BASIC METABOLIC PNL TOTAL CA: CPT | Mod: XB

## 2024-07-06 PROCEDURE — 85610 PROTHROMBIN TIME: CPT

## 2024-07-06 PROCEDURE — 80053 COMPREHEN METABOLIC PANEL: CPT

## 2024-07-06 PROCEDURE — 99232 SBSQ HOSP IP/OBS MODERATE 35: CPT | Mod: GC,,, | Performed by: INTERNAL MEDICINE

## 2024-07-06 PROCEDURE — 83735 ASSAY OF MAGNESIUM: CPT

## 2024-07-06 PROCEDURE — 63600175 PHARM REV CODE 636 W HCPCS: Performed by: STUDENT IN AN ORGANIZED HEALTH CARE EDUCATION/TRAINING PROGRAM

## 2024-07-06 PROCEDURE — 84100 ASSAY OF PHOSPHORUS: CPT

## 2024-07-06 PROCEDURE — 25000003 PHARM REV CODE 250: Performed by: STUDENT IN AN ORGANIZED HEALTH CARE EDUCATION/TRAINING PROGRAM

## 2024-07-06 RX ORDER — TALC
6 POWDER (GRAM) TOPICAL NIGHTLY PRN
Status: DISCONTINUED | OUTPATIENT
Start: 2024-07-07 | End: 2024-07-29 | Stop reason: HOSPADM

## 2024-07-06 RX ORDER — MAGNESIUM SULFATE HEPTAHYDRATE 40 MG/ML
2 INJECTION, SOLUTION INTRAVENOUS ONCE
Status: COMPLETED | OUTPATIENT
Start: 2024-07-06 | End: 2024-07-06

## 2024-07-06 RX ADMIN — PANTOPRAZOLE SODIUM 40 MG: 40 TABLET, DELAYED RELEASE ORAL at 08:07

## 2024-07-06 RX ADMIN — FLUTICASONE PROPIONATE AND SALMETEROL 1 PUFF: 50; 250 POWDER RESPIRATORY (INHALATION) at 08:07

## 2024-07-06 RX ADMIN — TIOTROPIUM BROMIDE INHALATION SPRAY 2 PUFF: 3.12 SPRAY, METERED RESPIRATORY (INHALATION) at 07:07

## 2024-07-06 RX ADMIN — POTASSIUM BICARBONATE 25 MEQ: 978 TABLET, EFFERVESCENT ORAL at 02:07

## 2024-07-06 RX ADMIN — POTASSIUM BICARBONATE 25 MEQ: 978 TABLET, EFFERVESCENT ORAL at 10:07

## 2024-07-06 RX ADMIN — MAGNESIUM SULFATE HEPTAHYDRATE 2 G: 40 INJECTION, SOLUTION INTRAVENOUS at 08:07

## 2024-07-06 RX ADMIN — ALLOPURINOL 300 MG: 100 TABLET ORAL at 08:07

## 2024-07-06 RX ADMIN — FUROSEMIDE 20 MG/HR: 10 INJECTION, SOLUTION INTRAMUSCULAR; INTRAVENOUS at 02:07

## 2024-07-06 RX ADMIN — FLUTICASONE PROPIONATE AND SALMETEROL 1 PUFF: 50; 250 POWDER RESPIRATORY (INHALATION) at 07:07

## 2024-07-06 RX ADMIN — WARFARIN SODIUM 5 MG: 5 TABLET ORAL at 05:07

## 2024-07-06 RX ADMIN — MELATONIN TAB 3 MG 6 MG: 3 TAB at 11:07

## 2024-07-06 RX ADMIN — POTASSIUM BICARBONATE 25 MEQ: 978 TABLET, EFFERVESCENT ORAL at 12:07

## 2024-07-06 NOTE — PLAN OF CARE
ICU DAILY GOALS     Family/Goals of care/Code Status   Code Status: Full Code    24H Vital Sign Range  Temp:  [97.9 °F (36.6 °C)-98.6 °F (37 °C)]   Pulse:  []   Resp:  [11-35]   BP: ()/(41-75)   SpO2:  [88 %-100 %]      Shift Events (include procedures and significant events)   No acute events throughout shift    AWAKE RASS: Goal - RASS Goal: 0-->alert and calm  Actual - RASS (Carter Agitation-Sedation Scale): alert and calm    Restraint necessity: Not necessary   BREATHE SBT: Not intubated    Coordinate A & B, analgesics/sedatives Pain: managed   SAT: Not intubated   Delirium CAM-ICU: Overall CAM-ICU: Negative   Early(intubated/ Progressive (non-intubated) Mobility MOVE Screen (INTUBATED ONLY): Not intubated    Activity: Activity Management: Rolling - L1   Feeding/Nutrition Diet order: Diet/Nutrition Received: low saturated fat/low cholesterol, 2 gram sodium, restrict fluids,     Thrombus DVT prophylaxis: VTE Required Core Measure: Pharmacological prophylaxis initiated/maintained   HOB Elevation Head of Bed (HOB) Positioning: HOB elevated   Ulcer Prophylaxis GI: yes   Glucose control  No issues   Skin Skin assessed during: Daily Assessment    Sacrum intact/not altered? Yes  Heels intact/not altered? Yes  Surgical wound? No    CHECK ONE!   (no altered skin or altered skin) and sub boxes:  [x] No Altered Skin Integrity Present    [x]Prevention Measures Documented    [] Altered Skin Integrity Present or Discovered   [] LDA present in EPIC, daily doc completed              [] LDA added if not in EPIC (describe wound).                    When describing wound, do not stage, use descriptive words only.    [] Wound Image Taken (required on admit,                   transfer/discharge and every Tuesday)    Wound Care Consulted? Yes for right leg wound    4 EYES:  Attending Nurse (1st set of eyes):     Second RN/Staff Member (2nd set of eyes):    Bowel Function no issues    Indwelling Catheter Necessity        Voids per urinal      De-escalation Antibiotics N/A        VS and assessment per flow sheet, patient progressing towards goals as tolerated, plan of care reviewed with Yong, all concerns addressed, will continue to monitor.    Problem: Adult Inpatient Plan of Care  Goal: Plan of Care Review  Outcome: Progressing     Problem: Wound  Goal: Absence of Infection Signs and Symptoms  Outcome: Progressing     Problem: Gas Exchange Impaired  Goal: Optimal Gas Exchange  Outcome: Not Progressing

## 2024-07-06 NOTE — ASSESSMENT & PLAN NOTE
WHO group 2-3 per his managing pulmonologist (Danyell at [facility])     Adherent with diet, CPAP, and on continuous O2 3L at home. Currently requiring 50L High flow 70% O2  In spite of latter, he continues to be polycythemic.     -- RV dysfunction treatment as above

## 2024-07-06 NOTE — ASSESSMENT & PLAN NOTE
NYHA class III symptoms with recurrent admissions.  He was seen by [facility] outpatient 6/2024 who state patient was feeling better on new diuretic regimen however worried about the frequency of use of metolazone and his worsening kidney function.       CXR with cardiomegaly and pulmonary vascular congestion, suggestive of pulmonary edema secondary to CHF. BNP elevated at 800. Creatinine worse at 3.1  started on lasix gtt at 20/hr. Hold home metolazone and will assess UOP response to see if should restart metolazone  Ordered repeat echo  Currently BP is stable, if decline or no improvement may consider, NE with Tenzin may help with RVF     Given his recurrent HF admissions and most recent hemodynamics, [facility] believes his overall prognosis is poor and recommended palliative care consult. Inpatient consult has been placed.     Recommend 2 gram sodium restriction and 1500cc fluid restriction.     Encourage physical activity with graded exercise program.    -- Continue lasix gtt  -- Monitor lytes BID; replete as indicated

## 2024-07-06 NOTE — PROGRESS NOTES
[facility]  Cardiology  Progress Note    Patient Name: Yong Mcintyre  MRN: [id number]  Admission Date: 7/4/2024  Hospital Length of Stay: 2 days  Code Status: Full Code   Attending Physician: Fermin Davis MD   Primary Care Physician: Gianni Escalona MD  Expected Discharge Date: 7/8/2024  Principal Problem:Right ventricular dysfunction    Subjective:     Hospital Course:   The patient was admitted to the CCU for further management of right-sided heart failure. He was diuresed with a lasix gtt. Electrolytes were monitored and repleted as indicated. Palliative care was consulted given his poor prognosis.     Interval History: No acute overnight events. Patient diuresing well. Palliative care consulted.    Review of Systems   Constitutional: Positive for weight gain. Negative for fever.   HENT:  Negative for congestion.    Cardiovascular:  Positive for leg swelling. Negative for near-syncope, palpitations and syncope.   Respiratory:  Positive for shortness of breath.    Gastrointestinal:  Negative for abdominal pain and heartburn.   Genitourinary:  Negative for dysuria and hematuria.   Psychiatric/Behavioral:  The patient is not nervous/anxious.    All other systems reviewed and are negative.    Objective:     Vital Signs (Most Recent):  Temp: 98.2 °F (36.8 °C) (07/06/24 0800)  Pulse: 102 (07/06/24 1130)  Resp: 20 (07/06/24 1130)  BP: 99/65 (07/06/24 1100)  SpO2: 97 % (07/06/24 1130) Vital Signs (24h Range):  Temp:  [98 °F (36.7 °C)-98.6 °F (37 °C)] 98.2 °F (36.8 °C)  Pulse:  [] 102  Resp:  [11-26] 20  SpO2:  [88 %-97 %] 97 %  BP: ()/(41-73) 99/65     Weight: 103 kg (227 lb 1.2 oz)  Body mass index is 37.79 kg/m².     SpO2: 97 %         Intake/Output Summary (Last 24 hours) at 7/6/2024 1351  Last data filed at 7/6/2024 1324  Gross per 24 hour   Intake 1033.53 ml   Output 4600 ml   Net -3566.47 ml       Lines/Drains/Airways       Peripheral Intravenous Line  Duration                   Peripheral IV - Single Lumen 07/04/24 1826 20 G Posterior;Right Hand 1 day         Peripheral IV - Single Lumen 07/04/24 2353 20 G Left Antecubital 1 day                       Physical Exam  Vitals and nursing note reviewed.   Constitutional:       Appearance: Normal appearance.   HENT:      Right Ear: External ear normal.      Left Ear: External ear normal.      Mouth/Throat:      Mouth: Mucous membranes are moist.      Pharynx: No posterior oropharyngeal erythema.   Eyes:      Extraocular Movements: Extraocular movements intact.      Conjunctiva/sclera: Conjunctivae normal.   Cardiovascular:      Rate and Rhythm: Regular rhythm. Tachycardia present.   Pulmonary:      Effort: Respiratory distress present.      Breath sounds: Rhonchi present.   Abdominal:      General: There is distension.   Musculoskeletal:      Right lower leg: Edema present.      Left lower leg: Edema present.   Skin:     General: Skin is warm.      Findings: No rash.   Neurological:      Mental Status: He is alert and oriented to person, place, and time. Mental status is at baseline.         Significant Labs: CMP   Recent Labs   Lab 07/05/24  0026 07/05/24  0349 07/05/24  0803 07/06/24  0211 07/06/24  1236    136 137 140 140   K 3.3* 3.6 3.4* 3.5 3.8   CL 89* 89* 88* 91* 89*   CO2 34* 34* 38* 37* 42*   * 107 95 117* 106   * 100* 98* 91* 86*   CREATININE 3.0* 2.8* 2.6* 2.1* 2.0*   CALCIUM 9.9 10.1 9.9 9.0 9.4   PROT 7.7 8.0  --  7.0  --    ALBUMIN 2.9* 3.0*  --  2.6*  --    BILITOT 1.5* 1.4*  --  1.2*  --    ALKPHOS 111 114  --  102  --    AST 21 23  --  19  --    ALT 12 13  --  11  --    ANIONGAP 13 13 11 12 9   , CBC   Recent Labs   Lab 07/04/24  1830 07/04/24  1841 07/05/24  0349 07/06/24  0211   WBC 6.56  --  7.19 6.60   HGB 15.4  --  15.8 14.4   HCT 55.1*   < > 54.5* 52.7     --  202 203    < > = values in this interval not displayed.   , and All pertinent lab results from the last 24 hours have been  reviewed.    Assessment and Plan:     * Right ventricular dysfunction  NYHA class III symptoms with recurrent admissions.  He was seen by [facility] outpatient 6/2024 who state patient was feeling better on new diuretic regimen however worried about the frequency of use of metolazone and his worsening kidney function.       CXR with cardiomegaly and pulmonary vascular congestion, suggestive of pulmonary edema secondary to CHF. BNP elevated at 800. Creatinine worse at 3.1  started on lasix gtt at 20/hr. Hold home metolazone and will assess UOP response to see if should restart metolazone  Ordered repeat echo  Currently BP is stable, if decline or no improvement may consider, NE with Tenzin may help with RVF     Given his recurrent HF admissions and most recent hemodynamics, [facility] believes his overall prognosis is poor and recommended palliative care consult. Inpatient consult has been placed.     Recommend 2 gram sodium restriction and 1500cc fluid restriction.     Encourage physical activity with graded exercise program.    -- Continue lasix gtt  -- Monitor lytes BID; replete as indicated    Paroxysmal A-fib  On coumadin.    -- Goal INR 2-3    MALLIKA (obstructive sleep apnea)  BIPAP    Pulmonary hypertension  WHO group 2-3 per his managing pulmonologist (Danyell at [facility])     Adherent with diet, CPAP, and on continuous O2 3L at home. Currently requiring 50L High flow 70% O2  In spite of latter, he continues to be polycythemic.     -- RV dysfunction treatment as above    Hypoventilation syndrome  BIPAP QHS once HFNC titrated        VTE Risk Mitigation (From admission, onward)           Ordered     warfarin (COUMADIN) tablet 5 mg  Once per day on Sunday Monday Tuesday Wednesday Friday Saturday 07/04/24 2349     IP VTE HIGH RISK PATIENT  Once         07/04/24 2302     Place sequential compression device  Until discontinued         07/04/24 2302                    Vik White MD  Cardiology  [facility]

## 2024-07-06 NOTE — SUBJECTIVE & OBJECTIVE
Interval History: No acute overnight events. Patient diuresing well. Palliative care consulted.    Review of Systems   Constitutional: Positive for weight gain. Negative for fever.   HENT:  Negative for congestion.    Cardiovascular:  Positive for leg swelling. Negative for near-syncope, palpitations and syncope.   Respiratory:  Positive for shortness of breath.    Gastrointestinal:  Negative for abdominal pain and heartburn.   Genitourinary:  Negative for dysuria and hematuria.   Psychiatric/Behavioral:  The patient is not nervous/anxious.    All other systems reviewed and are negative.    Objective:     Vital Signs (Most Recent):  Temp: 98.2 °F (36.8 °C) (07/06/24 0800)  Pulse: 102 (07/06/24 1130)  Resp: 20 (07/06/24 1130)  BP: 99/65 (07/06/24 1100)  SpO2: 97 % (07/06/24 1130) Vital Signs (24h Range):  Temp:  [98 °F (36.7 °C)-98.6 °F (37 °C)] 98.2 °F (36.8 °C)  Pulse:  [] 102  Resp:  [11-26] 20  SpO2:  [88 %-97 %] 97 %  BP: ()/(41-73) 99/65     Weight: 103 kg (227 lb 1.2 oz)  Body mass index is 37.79 kg/m².     SpO2: 97 %         Intake/Output Summary (Last 24 hours) at 7/6/2024 1351  Last data filed at 7/6/2024 1324  Gross per 24 hour   Intake 1033.53 ml   Output 4600 ml   Net -3566.47 ml       Lines/Drains/Airways       Peripheral Intravenous Line  Duration                  Peripheral IV - Single Lumen 07/04/24 1826 20 G Posterior;Right Hand 1 day         Peripheral IV - Single Lumen 07/04/24 2353 20 G Left Antecubital 1 day                       Physical Exam  Vitals and nursing note reviewed.   Constitutional:       Appearance: Normal appearance.   HENT:      Right Ear: External ear normal.      Left Ear: External ear normal.      Mouth/Throat:      Mouth: Mucous membranes are moist.      Pharynx: No posterior oropharyngeal erythema.   Eyes:      Extraocular Movements: Extraocular movements intact.      Conjunctiva/sclera: Conjunctivae normal.   Cardiovascular:      Rate and Rhythm: Regular rhythm.  Tachycardia present.   Pulmonary:      Effort: Respiratory distress present.      Breath sounds: Rhonchi present.   Abdominal:      General: There is distension.   Musculoskeletal:      Right lower leg: Edema present.      Left lower leg: Edema present.   Skin:     General: Skin is warm.      Findings: No rash.   Neurological:      Mental Status: He is alert and oriented to person, place, and time. Mental status is at baseline.         Significant Labs: CMP   Recent Labs   Lab 07/05/24  0026 07/05/24  0349 07/05/24  0803 07/06/24  0211 07/06/24  1236    136 137 140 140   K 3.3* 3.6 3.4* 3.5 3.8   CL 89* 89* 88* 91* 89*   CO2 34* 34* 38* 37* 42*   * 107 95 117* 106   * 100* 98* 91* 86*   CREATININE 3.0* 2.8* 2.6* 2.1* 2.0*   CALCIUM 9.9 10.1 9.9 9.0 9.4   PROT 7.7 8.0  --  7.0  --    ALBUMIN 2.9* 3.0*  --  2.6*  --    BILITOT 1.5* 1.4*  --  1.2*  --    ALKPHOS 111 114  --  102  --    AST 21 23  --  19  --    ALT 12 13  --  11  --    ANIONGAP 13 13 11 12 9   , CBC   Recent Labs   Lab 07/04/24  1830 07/04/24  1841 07/05/24  0349 07/06/24  0211   WBC 6.56  --  7.19 6.60   HGB 15.4  --  15.8 14.4   HCT 55.1*   < > 54.5* 52.7     --  202 203    < > = values in this interval not displayed.   , and All pertinent lab results from the last 24 hours have been reviewed.

## 2024-07-07 LAB
ALBUMIN SERPL BCP-MCNC: 2.7 G/DL (ref 3.5–5.2)
ALP SERPL-CCNC: 108 U/L (ref 55–135)
ALT SERPL W/O P-5'-P-CCNC: 10 U/L (ref 10–44)
ANION GAP SERPL CALC-SCNC: 11 MMOL/L (ref 8–16)
AST SERPL-CCNC: 19 U/L (ref 10–40)
BASOPHILS # BLD AUTO: 0.03 K/UL (ref 0–0.2)
BASOPHILS NFR BLD: 0.4 % (ref 0–1.9)
BILIRUB SERPL-MCNC: 1.6 MG/DL (ref 0.1–1)
BUN SERPL-MCNC: 81 MG/DL (ref 6–20)
CALCIUM SERPL-MCNC: 9.4 MG/DL (ref 8.7–10.5)
CHLORIDE SERPL-SCNC: 87 MMOL/L (ref 95–110)
CO2 SERPL-SCNC: 43 MMOL/L (ref 23–29)
CREAT SERPL-MCNC: 2 MG/DL (ref 0.5–1.4)
DIFFERENTIAL METHOD BLD: ABNORMAL
EOSINOPHIL # BLD AUTO: 0 K/UL (ref 0–0.5)
EOSINOPHIL NFR BLD: 0 % (ref 0–8)
ERYTHROCYTE [DISTWIDTH] IN BLOOD BY AUTOMATED COUNT: 21.7 % (ref 11.5–14.5)
EST. GFR  (NO RACE VARIABLE): 40.4 ML/MIN/1.73 M^2
GLUCOSE SERPL-MCNC: 116 MG/DL (ref 70–110)
HCT VFR BLD AUTO: 54.7 % (ref 40–54)
HGB BLD-MCNC: 14.8 G/DL (ref 14–18)
IMM GRANULOCYTES # BLD AUTO: 0.02 K/UL (ref 0–0.04)
IMM GRANULOCYTES NFR BLD AUTO: 0.3 % (ref 0–0.5)
INR PPP: 2.3 (ref 0.8–1.2)
LYMPHOCYTES # BLD AUTO: 0.7 K/UL (ref 1–4.8)
LYMPHOCYTES NFR BLD: 9.9 % (ref 18–48)
MAGNESIUM SERPL-MCNC: 1.8 MG/DL (ref 1.6–2.6)
MCH RBC QN AUTO: 23.7 PG (ref 27–31)
MCHC RBC AUTO-ENTMCNC: 27.1 G/DL (ref 32–36)
MCV RBC AUTO: 88 FL (ref 82–98)
MONOCYTES # BLD AUTO: 0.7 K/UL (ref 0.3–1)
MONOCYTES NFR BLD: 9.3 % (ref 4–15)
NEUTROPHILS # BLD AUTO: 5.8 K/UL (ref 1.8–7.7)
NEUTROPHILS NFR BLD: 80.1 % (ref 38–73)
NRBC BLD-RTO: 0 /100 WBC
PHOSPHATE SERPL-MCNC: 3.7 MG/DL (ref 2.7–4.5)
PLATELET # BLD AUTO: 192 K/UL (ref 150–450)
PMV BLD AUTO: 9.4 FL (ref 9.2–12.9)
POTASSIUM SERPL-SCNC: 3.3 MMOL/L (ref 3.5–5.1)
PROT SERPL-MCNC: 7.5 G/DL (ref 6–8.4)
PROTHROMBIN TIME: 23.6 SEC (ref 9–12.5)
RBC # BLD AUTO: 6.24 M/UL (ref 4.6–6.2)
SODIUM SERPL-SCNC: 141 MMOL/L (ref 136–145)
WBC # BLD AUTO: 7.28 K/UL (ref 3.9–12.7)

## 2024-07-07 PROCEDURE — 25000003 PHARM REV CODE 250: Performed by: STUDENT IN AN ORGANIZED HEALTH CARE EDUCATION/TRAINING PROGRAM

## 2024-07-07 PROCEDURE — 80053 COMPREHEN METABOLIC PANEL: CPT

## 2024-07-07 PROCEDURE — 85025 COMPLETE CBC W/AUTO DIFF WBC: CPT

## 2024-07-07 PROCEDURE — 20000000 HC ICU ROOM

## 2024-07-07 PROCEDURE — 5A09357 ASSISTANCE WITH RESPIRATORY VENTILATION, LESS THAN 24 CONSECUTIVE HOURS, CONTINUOUS POSITIVE AIRWAY PRESSURE: ICD-10-PCS | Performed by: INTERNAL MEDICINE

## 2024-07-07 PROCEDURE — 94660 CPAP INITIATION&MGMT: CPT

## 2024-07-07 PROCEDURE — 27000190 HC CPAP FULL FACE MASK W/VALVE

## 2024-07-07 PROCEDURE — 83735 ASSAY OF MAGNESIUM: CPT

## 2024-07-07 PROCEDURE — 27100171 HC OXYGEN HIGH FLOW UP TO 24 HOURS

## 2024-07-07 PROCEDURE — 99900035 HC TECH TIME PER 15 MIN (STAT)

## 2024-07-07 PROCEDURE — 99232 SBSQ HOSP IP/OBS MODERATE 35: CPT | Mod: GC,,, | Performed by: INTERNAL MEDICINE

## 2024-07-07 PROCEDURE — 85610 PROTHROMBIN TIME: CPT

## 2024-07-07 PROCEDURE — 94799 UNLISTED PULMONARY SVC/PX: CPT

## 2024-07-07 PROCEDURE — 94761 N-INVAS EAR/PLS OXIMETRY MLT: CPT

## 2024-07-07 PROCEDURE — 84100 ASSAY OF PHOSPHORUS: CPT

## 2024-07-07 RX ORDER — POTASSIUM CHLORIDE 20 MEQ/1
40 TABLET, EXTENDED RELEASE ORAL ONCE
Status: COMPLETED | OUTPATIENT
Start: 2024-07-07 | End: 2024-07-07

## 2024-07-07 RX ADMIN — PANTOPRAZOLE SODIUM 40 MG: 40 TABLET, DELAYED RELEASE ORAL at 08:07

## 2024-07-07 RX ADMIN — FLUTICASONE PROPIONATE AND SALMETEROL 1 PUFF: 50; 250 POWDER RESPIRATORY (INHALATION) at 08:07

## 2024-07-07 RX ADMIN — WARFARIN SODIUM 5 MG: 5 TABLET ORAL at 05:07

## 2024-07-07 RX ADMIN — POTASSIUM CHLORIDE 40 MEQ: 1500 TABLET, EXTENDED RELEASE ORAL at 03:07

## 2024-07-07 RX ADMIN — TIOTROPIUM BROMIDE INHALATION SPRAY 2 PUFF: 3.12 SPRAY, METERED RESPIRATORY (INHALATION) at 08:07

## 2024-07-07 RX ADMIN — ALLOPURINOL 300 MG: 100 TABLET ORAL at 08:07

## 2024-07-07 NOTE — PLAN OF CARE
PLAN OF CARE    Dx: Right ventricular dysfunction    Goals of Care: O2 sat>90%    Vital Signs (last 12 hours):   Temp:  [98.6 °F (37 °C)-98.8 °F (37.1 °C)]   Pulse:  [105-117]   Resp:  [14-34]   BP: ()/(55-79)   SpO2:  [88 %-99 %]      Neuro: AAOx4, Follows commands , and Moves all extremities spontaneously     Cardiac: STACH      Respiratory: Airvo 55L, 75% FiO2    Gtts: Lasix    Urine Output: Voids Spontaneously  1800 mL/shift      Diet: Cardiac  and 1500mL Fluid Restriction     Labs/Accuchecks: Daily labs    Skin:  No new skin injury noted overnight.  Patient turning frequently, independently, bony prominences protected, and mattress inflated/working correctly.     Shift Events:  Pt kept on lasix drip with good UOP. Slight increased in O2 requirement overnight.   See flowsheet for further assessment/details.  K replaced  per Dr's order. Patient educated on current condition/plan of care, questions answered, and emotional support provided.  MD updated on current condition, vitals, labs, and gtts.

## 2024-07-07 NOTE — NURSING
CICU DAILY GOALS       -No acute events  -Airvo titrated to 50L/65%, SpO2 goal > 90%  -BIPAP HS and PRN  -Lasix drip decreased to 5 mg. UOP/shift: 875  -CXR  -RHC tomorrow  -Palliative care consult ongoing    A: Awake    RASS: Goal - RASS Goal: 0-->alert and calm  Actual - RASS (Carter Agitation-Sedation Scale): alert and calm   Restraint necessity:    B: Breath   SBT: Not intubated   C: Coordinate A & B, analgesics/sedatives   Pain: managed    SAT: NA  D: Delirium   CAM-ICU: Overall CAM-ICU: Negative  E: Early(intubated/ Progressive (non-intubated) Mobility   MOVE Screen: Pass   Activity: Activity Management: Ambulated in room - L4  FAS: Feeding/Nutrition   Diet order: Diet/Nutrition Received: low saturated fat/low cholesterol,   Fluid restriction:     Nutritional Supplement Intake: Quantity 0, Type:  NA  T: Thrombus   DVT prophylaxis: VTE Required Core Measure: Pharmacological prophylaxis initiated/maintained  H: HOB Elevation   Head of Bed (HOB) Positioning: HOB elevated  U: Ulcer Prophylaxis   GI: yes  G: Glucose control   managed      S: Skin   Nurses Note -- 4 Eyes  Skin assessed during: Daily Assessment   CHOOSE ONE:  [] No Altered Skin Integrity Present      []Prevention Measures Documented    [x] Yes- Altered Skin Integrity Present or Discovered     [x] LDA Added if Not in Epic (Describe Wound)     [] New Altered Skin Integrity was Present on Admit and Documented in LDA     [] Wound Image Taken  Wound Care Consulted? Yes  Attending Nurse:  Lorna Natarajan RN/Staff Member:  Yes    B: Bowel Function   no issues   I: Indwelling Catheters   Udnne necessity:     CVC necessity: No   IPAD offered: No  D: De-escalation Antibx   No  Family/Goals of care/Code Status   Code Status: Full Code     No acute events throughout day, VS and assessment per flow sheet, patient progressing towards goals as tolerated, plan of care reviewed with Yong Mcintyre and family, all concerns addressed, will continue to monitor.

## 2024-07-07 NOTE — PROGRESS NOTES
[facility]  Cardiology  Progress Note    Patient Name: Yong Mcintyre  MRN: [id number]  Admission Date: 7/4/2024  Hospital Length of Stay: 3 days  Code Status: Full Code   Attending Physician: Fermin Davis MD   Primary Care Physician: Gianni Escalona MD  Expected Discharge Date: 7/8/2024  Principal Problem:Right ventricular dysfunction    Subjective:     Hospital Course:   The patient was admitted to the CCU for further management of right-sided heart failure. He was diuresed with a lasix gtt. Electrolytes were monitored and repleted as indicated. Palliative care was consulted given his poor prognosis.     Interval History:     No acute overnight events. Patient diuresing well. BIPAP ordered.   Plans for RHC tomorrow       Review of Systems   Constitutional: Positive for weight gain. Negative for fever.   HENT:  Negative for congestion.    Cardiovascular:  Negative for leg swelling, near-syncope, palpitations and syncope.   Respiratory:  Positive for shortness of breath.    Gastrointestinal:  Negative for abdominal pain and heartburn.   Genitourinary:  Negative for dysuria and hematuria.   Psychiatric/Behavioral:  The patient is not nervous/anxious.    All other systems reviewed and are negative.    Objective:     Vital Signs (Most Recent):  Temp: 98.3 °F (36.8 °C) (07/07/24 1101)  Pulse: 105 (07/07/24 1522)  Resp: 17 (07/07/24 1522)  BP: 97/60 (07/07/24 1201)  SpO2: (!) 93 % (07/07/24 1522) Vital Signs (24h Range):  Temp:  [98 °F (36.7 °C)-98.6 °F (37 °C)] 98.3 °F (36.8 °C)  Pulse:  [101-119] 105  Resp:  [14-34] 17  SpO2:  [88 %-99 %] 93 %  BP: ()/(55-79) 97/60     Weight: 102.6 kg (226 lb 3.2 oz)  Body mass index is 37.64 kg/m².     SpO2: (!) 93 %         Intake/Output Summary (Last 24 hours) at 7/7/2024 1619  Last data filed at 7/7/2024 1201  Gross per 24 hour   Intake 1038.54 ml   Output 3625 ml   Net -2586.46 ml       Lines/Drains/Airways       Peripheral Intravenous Line   "Duration                  Peripheral IV - Single Lumen 07/04/24 1826 20 G Posterior;Right Hand 2 days         Peripheral IV - Single Lumen 07/04/24 2353 20 G Left Antecubital 2 days                       Physical Exam  Vitals and nursing note reviewed.   Constitutional:       Appearance: Normal appearance.   HENT:      Right Ear: External ear normal.      Left Ear: External ear normal.      Mouth/Throat:      Mouth: Mucous membranes are moist.      Pharynx: No posterior oropharyngeal erythema.   Eyes:      Extraocular Movements: Extraocular movements intact.      Conjunctiva/sclera: Conjunctivae normal.   Cardiovascular:      Rate and Rhythm: Regular rhythm. Tachycardia present.   Pulmonary:      Effort: Respiratory distress present.      Breath sounds: Rhonchi present.   Abdominal:      General: There is distension.   Musculoskeletal:      Right lower leg: Edema present.      Left lower leg: Edema present.   Skin:     General: Skin is warm.      Findings: No rash.   Neurological:      Mental Status: He is alert and oriented to person, place, and time. Mental status is at baseline.            Significant Labs: ABG: No results for input(s): "PH", "PCO2", "HCO3", "POCSATURATED", "BE" in the last 48 hours., Blood Culture: No results for input(s): "LABBLOO" in the last 48 hours., BMP:   Recent Labs   Lab 07/06/24  0211 07/06/24  1236 07/07/24  0236   * 106 116*    140 141   K 3.5 3.8 3.3*   CL 91* 89* 87*   CO2 37* 42* 43*   BUN 91* 86* 81*   CREATININE 2.1* 2.0* 2.0*   CALCIUM 9.0 9.4 9.4   MG 1.9  --  1.8   , CMP   Recent Labs   Lab 07/06/24  0211 07/06/24  1236 07/07/24  0236    140 141   K 3.5 3.8 3.3*   CL 91* 89* 87*   CO2 37* 42* 43*   * 106 116*   BUN 91* 86* 81*   CREATININE 2.1* 2.0* 2.0*   CALCIUM 9.0 9.4 9.4   PROT 7.0  --  7.5   ALBUMIN 2.6*  --  2.7*   BILITOT 1.2*  --  1.6*   ALKPHOS 102  --  108   AST 19  --  19   ALT 11  --  10   ANIONGAP 12 9 11   , CBC   Recent Labs   Lab " "07/06/24  0211 07/07/24  0236   WBC 6.60 7.28   HGB 14.4 14.8   HCT 52.7 54.7*    192   , INR   Recent Labs   Lab 07/06/24 0211 07/07/24  0236   INR 2.3* 2.3*   , Lipid Panel No results for input(s): "CHOL", "HDL", "LDLCALC", "TRIG", "CHOLHDL" in the last 48 hours.,   Pathology Results  (Last 10 years)      None        , Troponin No results for input(s): "TROPONINI" in the last 48 hours., and All pertinent lab results from the last 24 hours have been reviewed.     Assessment and Plan:     * Right ventricular dysfunction  NYHA class III symptoms with recurrent admissions.  He was seen by [facility] outpatient 6/2024 who state patient was feeling better on new diuretic regimen however worried about the frequency of use of metolazone and his worsening kidney function.       CXR with cardiomegaly and pulmonary vascular congestion, suggestive of pulmonary edema secondary to CHF. BNP elevated at 800. Creatinine worse at 3.1     started on lasix gtt at 20/hr. Hold home metolazone and will assess UOP response to see if should restart metolazone  Ordered repeat echo  Currently BP is stable, if decline or no improvement may consider, NE with Tenzin may help with RVF     Given his recurrent HF admissions and most recent hemodynamics, [facility] believes his overall prognosis is poor and recommended palliative care consult. Inpatient consult has been placed.     Recommend 2 gram sodium restriction and 1500cc fluid restriction.     Encourage physical activity with graded exercise program.    -- dec lasix to 5   -- Monitor lytes BID; replete as indicated    Paroxysmal A-fib  On coumadin.    -- Goal INR 2-3     MALLIKA (obstructive sleep apnea)  BIPAP QHS     Pulmonary hypertension  WHO group 2-3 per his managing pulmonologist (Danyell at [facility])     Adherent with diet, CPAP, and on continuous O2 3L at home. Currently requiring 50L High flow 70% O2  In spite of latter, he continues to be polycythemic.      -- RV dysfunction treatment as " above    Hypoventilation syndrome  BIPAP QHS once HFNC titrated        VTE Risk Mitigation (From admission, onward)           Ordered     warfarin (COUMADIN) tablet 5 mg  Once per day on Sunday Monday Tuesday Wednesday Friday Saturday 07/04/24 2349     IP VTE HIGH RISK PATIENT  Once         07/04/24 2302     Place sequential compression device  Until discontinued         07/04/24 2302                    Chanel Ingram MD  Cardiology  [facility]

## 2024-07-07 NOTE — ASSESSMENT & PLAN NOTE
WHO group 2-3 per his managing pulmonologist (Danyell at [facility])     Adherent with diet, CPAP, and on continuous O2 3L at home. Currently requiring 50L High flow 70% O2  In spite of latter, he continues to be polycythemic.      -- RV dysfunction treatment as above

## 2024-07-07 NOTE — SUBJECTIVE & OBJECTIVE
Interval History:     No acute overnight events. Patient diuresing well. BIPAP ordered.   Plans for RHC tomorrow       Review of Systems   Constitutional: Positive for weight gain. Negative for fever.   HENT:  Negative for congestion.    Cardiovascular:  Negative for leg swelling, near-syncope, palpitations and syncope.   Respiratory:  Positive for shortness of breath.    Gastrointestinal:  Negative for abdominal pain and heartburn.   Genitourinary:  Negative for dysuria and hematuria.   Psychiatric/Behavioral:  The patient is not nervous/anxious.    All other systems reviewed and are negative.    Objective:     Vital Signs (Most Recent):  Temp: 98.3 °F (36.8 °C) (07/07/24 1101)  Pulse: 105 (07/07/24 1522)  Resp: 17 (07/07/24 1522)  BP: 97/60 (07/07/24 1201)  SpO2: (!) 93 % (07/07/24 1522) Vital Signs (24h Range):  Temp:  [98 °F (36.7 °C)-98.6 °F (37 °C)] 98.3 °F (36.8 °C)  Pulse:  [101-119] 105  Resp:  [14-34] 17  SpO2:  [88 %-99 %] 93 %  BP: ()/(55-79) 97/60     Weight: 102.6 kg (226 lb 3.2 oz)  Body mass index is 37.64 kg/m².     SpO2: (!) 93 %         Intake/Output Summary (Last 24 hours) at 7/7/2024 1619  Last data filed at 7/7/2024 1201  Gross per 24 hour   Intake 1038.54 ml   Output 3625 ml   Net -2586.46 ml       Lines/Drains/Airways       Peripheral Intravenous Line  Duration                  Peripheral IV - Single Lumen 07/04/24 1826 20 G Posterior;Right Hand 2 days         Peripheral IV - Single Lumen 07/04/24 2353 20 G Left Antecubital 2 days                       Physical Exam  Vitals and nursing note reviewed.   Constitutional:       Appearance: Normal appearance.   HENT:      Right Ear: External ear normal.      Left Ear: External ear normal.      Mouth/Throat:      Mouth: Mucous membranes are moist.      Pharynx: No posterior oropharyngeal erythema.   Eyes:      Extraocular Movements: Extraocular movements intact.      Conjunctiva/sclera: Conjunctivae normal.   Cardiovascular:      Rate and  "Rhythm: Regular rhythm. Tachycardia present.   Pulmonary:      Effort: Respiratory distress present.      Breath sounds: Rhonchi present.   Abdominal:      General: There is distension.   Musculoskeletal:      Right lower leg: Edema present.      Left lower leg: Edema present.   Skin:     General: Skin is warm.      Findings: No rash.   Neurological:      Mental Status: He is alert and oriented to person, place, and time. Mental status is at baseline.            Significant Labs: ABG: No results for input(s): "PH", "PCO2", "HCO3", "POCSATURATED", "BE" in the last 48 hours., Blood Culture: No results for input(s): "LABBLOO" in the last 48 hours., BMP:   Recent Labs   Lab 07/06/24 0211 07/06/24 1236 07/07/24  0236   * 106 116*    140 141   K 3.5 3.8 3.3*   CL 91* 89* 87*   CO2 37* 42* 43*   BUN 91* 86* 81*   CREATININE 2.1* 2.0* 2.0*   CALCIUM 9.0 9.4 9.4   MG 1.9  --  1.8   , CMP   Recent Labs   Lab 07/06/24 0211 07/06/24 1236 07/07/24  0236    140 141   K 3.5 3.8 3.3*   CL 91* 89* 87*   CO2 37* 42* 43*   * 106 116*   BUN 91* 86* 81*   CREATININE 2.1* 2.0* 2.0*   CALCIUM 9.0 9.4 9.4   PROT 7.0  --  7.5   ALBUMIN 2.6*  --  2.7*   BILITOT 1.2*  --  1.6*   ALKPHOS 102  --  108   AST 19  --  19   ALT 11  --  10   ANIONGAP 12 9 11   , CBC   Recent Labs   Lab 07/06/24 0211 07/07/24  0236   WBC 6.60 7.28   HGB 14.4 14.8   HCT 52.7 54.7*    192   , INR   Recent Labs   Lab 07/06/24  0211 07/07/24  0236   INR 2.3* 2.3*   , Lipid Panel No results for input(s): "CHOL", "HDL", "LDLCALC", "TRIG", "CHOLHDL" in the last 48 hours.,   Pathology Results  (Last 10 years)      None        , Troponin No results for input(s): "TROPONINI" in the last 48 hours., and All pertinent lab results from the last 24 hours have been reviewed.     "

## 2024-07-07 NOTE — ASSESSMENT & PLAN NOTE
NYHA class III symptoms with recurrent admissions.  He was seen by [facility] outpatient 6/2024 who state patient was feeling better on new diuretic regimen however worried about the frequency of use of metolazone and his worsening kidney function.       CXR with cardiomegaly and pulmonary vascular congestion, suggestive of pulmonary edema secondary to CHF. BNP elevated at 800. Creatinine worse at 3.1     started on lasix gtt at 20/hr. Hold home metolazone and will assess UOP response to see if should restart metolazone  Ordered repeat echo  Currently BP is stable, if decline or no improvement may consider, NE with Tenzin may help with RVF     Given his recurrent HF admissions and most recent hemodynamics, [facility] believes his overall prognosis is poor and recommended palliative care consult. Inpatient consult has been placed.     Recommend 2 gram sodium restriction and 1500cc fluid restriction.     Encourage physical activity with graded exercise program.    -- dec lasix to 5   -- Monitor lytes BID; replete as indicated

## 2024-07-08 LAB
ALBUMIN SERPL BCP-MCNC: 2.6 G/DL (ref 3.5–5.2)
ALLENS TEST: ABNORMAL
ALLENS TEST: ABNORMAL
ALLENS TEST: NORMAL
ALP SERPL-CCNC: 104 U/L (ref 55–135)
ALT SERPL W/O P-5'-P-CCNC: 9 U/L (ref 10–44)
ANION GAP SERPL CALC-SCNC: 7 MMOL/L (ref 8–16)
ANION GAP SERPL CALC-SCNC: 9 MMOL/L (ref 8–16)
AST SERPL-CCNC: 18 U/L (ref 10–40)
BASOPHILS # BLD AUTO: 0.03 K/UL (ref 0–0.2)
BASOPHILS NFR BLD: 0.4 % (ref 0–1.9)
BILIRUB SERPL-MCNC: 1.8 MG/DL (ref 0.1–1)
BUN SERPL-MCNC: 76 MG/DL (ref 6–20)
BUN SERPL-MCNC: 80 MG/DL (ref 6–20)
CALCIUM SERPL-MCNC: 8.9 MG/DL (ref 8.7–10.5)
CALCIUM SERPL-MCNC: 9.1 MG/DL (ref 8.7–10.5)
CHLORIDE SERPL-SCNC: 88 MMOL/L (ref 95–110)
CHLORIDE SERPL-SCNC: 88 MMOL/L (ref 95–110)
CO2 SERPL-SCNC: 41 MMOL/L (ref 23–29)
CO2 SERPL-SCNC: 44 MMOL/L (ref 23–29)
CREAT SERPL-MCNC: 1.7 MG/DL (ref 0.5–1.4)
CREAT SERPL-MCNC: 1.9 MG/DL (ref 0.5–1.4)
DELSYS: ABNORMAL
DIFFERENTIAL METHOD BLD: ABNORMAL
EOSINOPHIL # BLD AUTO: 0 K/UL (ref 0–0.5)
EOSINOPHIL NFR BLD: 0 % (ref 0–8)
ERYTHROCYTE [DISTWIDTH] IN BLOOD BY AUTOMATED COUNT: 21.8 % (ref 11.5–14.5)
EST. GFR  (NO RACE VARIABLE): 43 ML/MIN/1.73 M^2
EST. GFR  (NO RACE VARIABLE): 49.1 ML/MIN/1.73 M^2
GLUCOSE SERPL-MCNC: 120 MG/DL (ref 70–110)
GLUCOSE SERPL-MCNC: 132 MG/DL (ref 70–110)
HCO3 UR-SCNC: 47.9 MMOL/L (ref 24–28)
HCO3 UR-SCNC: 48.7 MMOL/L (ref 24–28)
HCT VFR BLD AUTO: 49.8 % (ref 40–54)
HGB BLD-MCNC: 14.2 G/DL (ref 14–18)
IMM GRANULOCYTES # BLD AUTO: 0.02 K/UL (ref 0–0.04)
IMM GRANULOCYTES NFR BLD AUTO: 0.3 % (ref 0–0.5)
INR PPP: 1.7 (ref 0.8–1.2)
LDH SERPL L TO P-CCNC: 0.71 MMOL/L (ref 0.5–2.2)
LYMPHOCYTES # BLD AUTO: 0.9 K/UL (ref 1–4.8)
LYMPHOCYTES NFR BLD: 12.1 % (ref 18–48)
MAGNESIUM SERPL-MCNC: 1.5 MG/DL (ref 1.6–2.6)
MAGNESIUM SERPL-MCNC: 1.8 MG/DL (ref 1.6–2.6)
MCH RBC QN AUTO: 24.7 PG (ref 27–31)
MCHC RBC AUTO-ENTMCNC: 28.5 G/DL (ref 32–36)
MCV RBC AUTO: 87 FL (ref 82–98)
MONOCYTES # BLD AUTO: 0.5 K/UL (ref 0.3–1)
MONOCYTES NFR BLD: 7.2 % (ref 4–15)
NEUTROPHILS # BLD AUTO: 5.7 K/UL (ref 1.8–7.7)
NEUTROPHILS NFR BLD: 80 % (ref 38–73)
NRBC BLD-RTO: 0 /100 WBC
PCO2 BLDA: 85.4 MMHG (ref 35–45)
PCO2 BLDA: 85.5 MMHG (ref 35–45)
PH SMN: 7.36 [PH] (ref 7.35–7.45)
PH SMN: 7.36 [PH] (ref 7.35–7.45)
PHOSPHATE SERPL-MCNC: 4.1 MG/DL (ref 2.7–4.5)
PLATELET # BLD AUTO: 174 K/UL (ref 150–450)
PMV BLD AUTO: 9.5 FL (ref 9.2–12.9)
PO2 BLDA: 35 MMHG (ref 40–60)
PO2 BLDA: 35 MMHG (ref 40–60)
POC BE: 22 MMOL/L
POC BE: 23 MMOL/L
POC SATURATED O2: 60 % (ref 95–100)
POC SATURATED O2: 60 % (ref 95–100)
POC TCO2: >50 MMOL/L (ref 24–29)
POC TCO2: >50 MMOL/L (ref 24–29)
POTASSIUM SERPL-SCNC: 3.4 MMOL/L (ref 3.5–5.1)
POTASSIUM SERPL-SCNC: 3.6 MMOL/L (ref 3.5–5.1)
PROT SERPL-MCNC: 7.1 G/DL (ref 6–8.4)
PROTHROMBIN TIME: 18 SEC (ref 9–12.5)
RBC # BLD AUTO: 5.75 M/UL (ref 4.6–6.2)
SAMPLE: ABNORMAL
SAMPLE: ABNORMAL
SAMPLE: NORMAL
SITE: ABNORMAL
SITE: ABNORMAL
SITE: NORMAL
SODIUM SERPL-SCNC: 138 MMOL/L (ref 136–145)
SODIUM SERPL-SCNC: 139 MMOL/L (ref 136–145)
WBC # BLD AUTO: 7.12 K/UL (ref 3.9–12.7)

## 2024-07-08 PROCEDURE — 63600175 PHARM REV CODE 636 W HCPCS: Performed by: STUDENT IN AN ORGANIZED HEALTH CARE EDUCATION/TRAINING PROGRAM

## 2024-07-08 PROCEDURE — 84100 ASSAY OF PHOSPHORUS: CPT

## 2024-07-08 PROCEDURE — 99900035 HC TECH TIME PER 15 MIN (STAT)

## 2024-07-08 PROCEDURE — 83735 ASSAY OF MAGNESIUM: CPT

## 2024-07-08 PROCEDURE — 27100171 HC OXYGEN HIGH FLOW UP TO 24 HOURS

## 2024-07-08 PROCEDURE — 25000003 PHARM REV CODE 250: Performed by: STUDENT IN AN ORGANIZED HEALTH CARE EDUCATION/TRAINING PROGRAM

## 2024-07-08 PROCEDURE — 83605 ASSAY OF LACTIC ACID: CPT

## 2024-07-08 PROCEDURE — 80048 BASIC METABOLIC PNL TOTAL CA: CPT | Mod: XB

## 2024-07-08 PROCEDURE — 85610 PROTHROMBIN TIME: CPT

## 2024-07-08 PROCEDURE — 20000000 HC ICU ROOM

## 2024-07-08 PROCEDURE — 85025 COMPLETE CBC W/AUTO DIFF WBC: CPT

## 2024-07-08 PROCEDURE — 99232 SBSQ HOSP IP/OBS MODERATE 35: CPT | Mod: GC,,, | Performed by: INTERNAL MEDICINE

## 2024-07-08 PROCEDURE — 80053 COMPREHEN METABOLIC PANEL: CPT

## 2024-07-08 PROCEDURE — C1751 CATH, INF, PER/CENT/MIDLINE: HCPCS

## 2024-07-08 PROCEDURE — 63600175 PHARM REV CODE 636 W HCPCS

## 2024-07-08 PROCEDURE — 82803 BLOOD GASES ANY COMBINATION: CPT

## 2024-07-08 PROCEDURE — 94761 N-INVAS EAR/PLS OXIMETRY MLT: CPT | Mod: XB

## 2024-07-08 PROCEDURE — 99233 SBSQ HOSP IP/OBS HIGH 50: CPT | Mod: ,,, | Performed by: CLINICAL NURSE SPECIALIST

## 2024-07-08 PROCEDURE — 25000003 PHARM REV CODE 250

## 2024-07-08 RX ORDER — POTASSIUM CHLORIDE 20 MEQ/1
40 TABLET, EXTENDED RELEASE ORAL ONCE
Status: COMPLETED | OUTPATIENT
Start: 2024-07-08 | End: 2024-07-08

## 2024-07-08 RX ORDER — NITROGLYCERIN 20 MG/100ML
0-400 INJECTION INTRAVENOUS CONTINUOUS
Status: DISCONTINUED | OUTPATIENT
Start: 2024-07-08 | End: 2024-07-09

## 2024-07-08 RX ORDER — FUROSEMIDE 10 MG/ML
80 INJECTION INTRAMUSCULAR; INTRAVENOUS ONCE
Status: COMPLETED | OUTPATIENT
Start: 2024-07-08 | End: 2024-07-08

## 2024-07-08 RX ORDER — POTASSIUM CHLORIDE 29.8 MG/ML
40 INJECTION INTRAVENOUS ONCE
Status: COMPLETED | OUTPATIENT
Start: 2024-07-08 | End: 2024-07-08

## 2024-07-08 RX ORDER — MAGNESIUM SULFATE HEPTAHYDRATE 40 MG/ML
2 INJECTION, SOLUTION INTRAVENOUS ONCE
Status: COMPLETED | OUTPATIENT
Start: 2024-07-08 | End: 2024-07-08

## 2024-07-08 RX ADMIN — FUROSEMIDE 80 MG: 10 INJECTION, SOLUTION INTRAVENOUS at 02:07

## 2024-07-08 RX ADMIN — FLUTICASONE PROPIONATE AND SALMETEROL 1 PUFF: 50; 250 POWDER RESPIRATORY (INHALATION) at 08:07

## 2024-07-08 RX ADMIN — TIOTROPIUM BROMIDE INHALATION SPRAY 2 PUFF: 3.12 SPRAY, METERED RESPIRATORY (INHALATION) at 10:07

## 2024-07-08 RX ADMIN — MAGNESIUM SULFATE HEPTAHYDRATE 2 G: 40 INJECTION, SOLUTION INTRAVENOUS at 05:07

## 2024-07-08 RX ADMIN — ACETAMINOPHEN 650 MG: 325 TABLET ORAL at 12:07

## 2024-07-08 RX ADMIN — WARFARIN SODIUM 5 MG: 5 TABLET ORAL at 05:07

## 2024-07-08 RX ADMIN — POTASSIUM CHLORIDE 40 MEQ: 1500 TABLET, EXTENDED RELEASE ORAL at 05:07

## 2024-07-08 RX ADMIN — MAGNESIUM SULFATE HEPTAHYDRATE 2 G: 40 INJECTION, SOLUTION INTRAVENOUS at 02:07

## 2024-07-08 RX ADMIN — PANTOPRAZOLE SODIUM 40 MG: 40 TABLET, DELAYED RELEASE ORAL at 08:07

## 2024-07-08 RX ADMIN — ALLOPURINOL 300 MG: 100 TABLET ORAL at 08:07

## 2024-07-08 RX ADMIN — POTASSIUM BICARBONATE 20 MEQ: 391 TABLET, EFFERVESCENT ORAL at 02:07

## 2024-07-08 RX ADMIN — POTASSIUM CHLORIDE 40 MEQ: 29.8 INJECTION, SOLUTION INTRAVENOUS at 04:07

## 2024-07-08 RX ADMIN — NITROGLYCERIN 5 MCG/MIN: 20 INJECTION INTRAVENOUS at 10:07

## 2024-07-08 RX ADMIN — FLUTICASONE PROPIONATE AND SALMETEROL 1 PUFF: 50; 250 POWDER RESPIRATORY (INHALATION) at 10:07

## 2024-07-08 RX ADMIN — DEXTROSE 500 MG: 50 INJECTION, SOLUTION INTRAVENOUS at 03:07

## 2024-07-08 NOTE — ASSESSMENT & PLAN NOTE
NYHA class III symptoms with recurrent admissions.  He was seen by [facility] outpatient 6/2024 who state patient was feeling better on new diuretic regimen however worried about the frequency of use of metolazone and his worsening kidney function.       CXR with cardiomegaly and pulmonary vascular congestion, suggestive of pulmonary edema secondary to CHF. BNP elevated at 800. Creatinine worse at 3.1     started on lasix gtt at 20/hr. Hold home metolazone and will assess UOP response to see if should restart metolazone  Ordered repeat echo  Currently BP is stable, if decline or no improvement may consider, NE with Tenzin may help with RVF     Given his recurrent HF admissions and most recent hemodynamics, [facility] believes his overall prognosis is poor and recommended palliative care consult. Inpatient consult has been placed.     Recommend 2 gram sodium restriction and 1500cc fluid restriction.     Encourage physical activity with graded exercise program.    -- Place central line for CVP monitoring  -- Continue lasix gtt to 5; possibly increasing pending CVP as well as possible addition of acetazolamide  -- Inhaled nitro  -- Monitor lytes BID; replete as indicated  -- Will plan on central line to monitor CVP to guide diuresis

## 2024-07-08 NOTE — PLAN OF CARE
CVC placed today for CVP monitoring, lasix gtt increased, lasix 80mg bolus, and diamox. Airvo weaned 60% 50L  Problem: Adult Inpatient Plan of Care  Goal: Plan of Care Review  Outcome: Progressing  Goal: Patient-Specific Goal (Individualized)  Outcome: Progressing  Goal: Absence of Hospital-Acquired Illness or Injury  Outcome: Progressing  Goal: Optimal Comfort and Wellbeing  Outcome: Progressing     Problem: Bariatric Environmental Safety  Goal: Safety Maintained with Care  Outcome: Progressing     Problem: Coping Ineffective  Goal: Effective Coping  Outcome: Progressing     Problem: Wound  Goal: Optimal Functional Ability  Outcome: Progressing  Goal: Absence of Infection Signs and Symptoms  Outcome: Progressing  Goal: Improved Oral Intake  Outcome: Progressing  Goal: Optimal Pain Control and Function  Outcome: Progressing  Goal: Skin Health and Integrity  Outcome: Progressing     Problem: Fall Injury Risk  Goal: Absence of Fall and Fall-Related Injury  Outcome: Progressing     Problem: Skin Injury Risk Increased  Goal: Skin Health and Integrity  Outcome: Progressing     Problem: Infection  Goal: Absence of Infection Signs and Symptoms  Outcome: Progressing

## 2024-07-08 NOTE — NURSING
CICU DAILY GOALS       A: Awake    RASS: Goal - RASS Goal: 0-->alert and calm  Actual - RASS (Carter Agitation-Sedation Scale): alert and calm   Restraint necessity:    B: Breath   SBT: Not intubated   C: Coordinate A & B, analgesics/sedatives   Pain: managed    SAT: Not intubated  D: Delirium   CAM-ICU: Overall CAM-ICU: Negative  E: Early(intubated/ Progressive (non-intubated) Mobility   Activity: Activity Management: Sitting at edge of bed - L2  FAS: Feeding/Nutrition   Diet order: Diet/Nutrition Received: low saturated fat/low cholesterol,   Fluid restriction:       T: Thrombus   DVT prophylaxis: VTE Required Core Measure: Pharmacological prophylaxis initiated/maintained  H: HOB Elevation   Head of Bed (HOB) Positioning: HOB elevated  U: Ulcer Prophylaxis   GI: yes  G: Glucose control   managed      S: Skin   Nurses Note -- 4 Eyes  Skin assessed during: Q Shift Change   CHOOSE ONE:  [] No Altered Skin Integrity Present      []Prevention Measures Documented    [] Yes- Altered Skin Integrity Present or Discovered     [] LDA Added if Not in Epic (Describe Wound)     [] New Altered Skin Integrity was Present on Admit and Documented in LDA     [] Wound Image Taken  Wound Care Consulted? No  Attending Nurse:  Monico Natarajan RN/Staff Member:  Yes    B: Bowel Function   no issues   I: Indwelling Catheters   Dunne necessity:     CVC necessity: Yes   IPAD offered: No  D: De-escalation Antibx     Plan for the day   CVC place, CVP monitoring, continued diuresis   Family/Goals of care/Code Status   Code Status: Full Code     No acute events throughout day, VS and assessment per flow sheet, patient progressing towards goals as tolerated, plan of care reviewed with Yong Mcintyre and family, all concerns addressed, will continue to monitor.

## 2024-07-08 NOTE — ASSESSMENT & PLAN NOTE
Impression: Pt is a 47 y/o male with severe Pulmonary HTN.Pt is AAOx4. Pt is on high flow O2 50 L @ 65%.     Reason for consult: GOC/ACP. Communicated with Team.     GOC/ACP:     Today: Met with pt who is on comfort flow O2 50 L 65%. Pt to have central line placed today. Pt's goal is to go back home. We discussed barrier to home needing to be on comfort flow. Pt is hopeful he can be diuresed more to help wean O2 down.     Pal care will continue to follow.       7/5/24  Met with pt who is sitting up in bed. Pt is aware he has pulmonary HTN and that he will continue to decline from disease. Per pt, he sees Dr. Child for his Pulmonary HTN.  Per pt his goal is to continue medical treatment for his PHTN.      Pt open to continue pal care f/u. Pt's reports he lives by himself and his parents are NOK. Father lives locally and mother lives in [place]. Per pt they are aware of his medical issues.   Per pt if unable to make his own medical decisions he wants his parents to make them.     Symptom management:     Dyspnea:   Pt on high flow O2 at 35 L 45%  Pt reports no dyspnea on O2.     Pt denies pain, anxiety.     Plan:   Pal care will continue to follow.

## 2024-07-08 NOTE — SUBJECTIVE & OBJECTIVE
Interval History: No acute overnight events. Electrolytes repleted. Currently on lasix gtt of 5. Patient still requiring significant amounts of O2 (Comfort flow 50L 70%). Will place central line for CVP monitoring.    Review of Systems   Constitutional: Positive for weight gain. Negative for fever.   HENT:  Negative for congestion.    Cardiovascular:  Negative for leg swelling, near-syncope, palpitations and syncope.   Respiratory:  Positive for shortness of breath.    Gastrointestinal:  Negative for abdominal pain and heartburn.   Genitourinary:  Negative for dysuria and hematuria.   Psychiatric/Behavioral:  The patient is not nervous/anxious.    All other systems reviewed and are negative.    Objective:     Vital Signs (Most Recent):  Temp: 98 °F (36.7 °C) (07/08/24 0700)  Pulse: 101 (07/08/24 0817)  Resp: 14 (07/08/24 0817)  BP: (!) 114/56 (07/08/24 0800)  SpO2: (!) 93 % (07/08/24 0817) Vital Signs (24h Range):  Temp:  [97.2 °F (36.2 °C)-98.3 °F (36.8 °C)] 98 °F (36.7 °C)  Pulse:  [101-120] 101  Resp:  [13-42] 14  SpO2:  [90 %-99 %] 93 %  BP: ()/(51-71) 114/56     Weight: 108.3 kg (238 lb 10.4 oz)  Body mass index is 39.71 kg/m².     SpO2: (!) 93 %         Intake/Output Summary (Last 24 hours) at 7/8/2024 0852  Last data filed at 7/8/2024 0800  Gross per 24 hour   Intake 833.43 ml   Output 1700 ml   Net -866.57 ml       Lines/Drains/Airways       Peripheral Intravenous Line  Duration                  Peripheral IV - Single Lumen 07/07/24 2007 20 G Left;Posterior Hand <1 day         Peripheral IV - Single Lumen 07/07/24 2018 20 G Posterior;Right Wrist <1 day                       Physical Exam  Vitals and nursing note reviewed.   Constitutional:       Appearance: Normal appearance.   HENT:      Right Ear: External ear normal.      Left Ear: External ear normal.      Mouth/Throat:      Mouth: Mucous membranes are moist.      Pharynx: No posterior oropharyngeal erythema.   Eyes:      Extraocular Movements:  Extraocular movements intact.      Conjunctiva/sclera: Conjunctivae normal.   Cardiovascular:      Rate and Rhythm: Regular rhythm. Tachycardia present.   Pulmonary:      Effort: No respiratory distress.      Breath sounds: Rhonchi present. No wheezing.   Abdominal:      General: There is distension.   Musculoskeletal:      Right lower leg: Edema (trace) present.      Left lower leg: Edema (trace) present.   Skin:     General: Skin is warm.      Findings: No rash.   Neurological:      Mental Status: He is alert and oriented to person, place, and time. Mental status is at baseline.       Significant Labs: CMP   Recent Labs   Lab 07/06/24  1236 07/07/24  0236 07/08/24  0243    141 138   K 3.8 3.3* 3.4*   CL 89* 87* 88*   CO2 42* 43* 41*    116* 132*   BUN 86* 81* 80*   CREATININE 2.0* 2.0* 1.9*   CALCIUM 9.4 9.4 9.1   PROT  --  7.5 7.1   ALBUMIN  --  2.7* 2.6*   BILITOT  --  1.6* 1.8*   ALKPHOS  --  108 104   AST  --  19 18   ALT  --  10 9*   ANIONGAP 9 11 9   , CBC   Recent Labs   Lab 07/07/24  0236 07/08/24  0243   WBC 7.28 7.12   HGB 14.8 14.2   HCT 54.7* 49.8    174   , and All pertinent lab results from the last 24 hours have been reviewed.

## 2024-07-08 NOTE — MEDICAL/APP STUDENT
[facility] - Cardiac Intensive Care  Cardiology  Progress Note     Patient Name: Yong Mcintyre  MRN: [id number]  Admission Date: 7/4/2024  Hospital Length of Stay: 4  Code Status: Full Code   Attending Physician: Fermin Davis MD   Primary Care Physician: Gianni Escalona MD  Expected Discharge Date: 7/8/2024  Principal Problem:Right ventricular dysfunction     Subjective:      Hospital Course:   The patient is a 48 y.o. male with severe pulmonary hypertension (group 2-3, on 3L home O2), HFpEF, paroxysmal AF (on Coumadin), BMI > 35, hypertension, obstructive sleep apnea, obesity hypoventilation syndrome. Presenting for lower extremity swelling and worsening shortness of breath. On high flow 40L, he was saturating at 70%. Notably, this is his 5th admission this year. The patient was admitted for CCU for management of right-sided heart failure. He was started on Lasix gtt.      Interval History:   Continues with electrolyte repletion. CVP 17. I/O -1.8L yesterday. Lasix gtt increased to 15 mg/hr. IV Acetazolamide 500 mg given for contraction alkalosis. Nitroglycerin 200 mcg/mL gtt started and stopped due to respiratory distress.      Review of Systems   Constitutional:  Negative for chills, fever and weight loss.   Eyes:  Negative for blurred vision and double vision.   Respiratory:  Negative for cough, sputum production, shortness of breath and wheezing.    Cardiovascular:  Positive for leg swelling. Negative for chest pain, palpitations, orthopnea and PND.   Gastrointestinal:  Negative for abdominal pain, nausea and vomiting.   Genitourinary:  Negative for frequency, hematuria and urgency.   Musculoskeletal:  Negative for back pain, joint pain and neck pain.   Skin:  Negative for rash.   Neurological:  Negative for dizziness, focal weakness and headaches.   Endo/Heme/Allergies:  Does not bruise/bleed easily.   All other systems reviewed and are negative.      Objective:      Vital Signs (Most Recent):  Temp: 98.3  °F (36.8 °C) (07/07/24 1101)  Pulse: 105 (07/07/24 1522)  Resp: 17 (07/07/24 1522)  BP: 97/60 (07/07/24 1201)  SpO2: (!) 93 % (07/07/24 1522) Vital Signs (24h Range):  Temp:  [98 °F (36.7 °C)-98.6 °F (37 °C)] 98.3 °F (36.8 °C)  Pulse:  [101-119] 105  Resp:  [14-34] 17  SpO2:  [88 %-99 %] 93 %  BP: ()/(55-79) 97/60      Weight: 102.6 kg (226 lb 3.2 oz)  Body mass index is 37.64 kg/m².     SpO2: (!) 93 %       Intake/Output Summary (Last 24 hours) at 7/8/2024 0851  Last data filed at 7/8/2024 0800  Gross per 24 hour   Intake 833.43 ml   Output 1700 ml   Net -866.57 ml          Lines/Drains/Airways         Peripheral Intravenous Line  Duration                     Peripheral IV - Single Lumen 07/04/24 1826 20 G Posterior;Right Hand 2 days          Peripheral IV - Single Lumen 07/04/24 2353 20 G Left Antecubital 2 days                          Physical Exam  Vitals reviewed.   Constitutional:       General: He is not in acute distress.     Appearance: Normal appearance. He is obese. He is not toxic-appearing.   HENT:      Right Ear: External ear normal.      Left Ear: External ear normal.      Nose: Nose normal.   Eyes:      General:         Right eye: No discharge.         Left eye: No discharge.   Cardiovascular:      Rate and Rhythm: Normal rate and regular rhythm.      Pulses: Normal pulses.      Heart sounds: Normal heart sounds. No murmur heard.     No friction rub.   Pulmonary:      Effort: Pulmonary effort is normal. No respiratory distress.      Breath sounds: No stridor. Rales present. No wheezing.   Abdominal:      General: There is distension.      Palpations: Abdomen is soft.      Tenderness: There is no abdominal tenderness.   Musculoskeletal:         General: Swelling present.      Right lower leg: Edema present.      Left lower leg: Edema present.   Skin:     General: Skin is warm.      Capillary Refill: Capillary refill takes less than 2 seconds.   Neurological:      Mental Status: He is alert and  oriented to person, place, and time. Mental status is at baseline.   Psychiatric:         Behavior: Behavior normal.         Significant Labs:  INR 1.5  Cr 1.7  K 3.5  Total bilirubin 1.7  CO2 43    Assessment and Plan:      * Right ventricular dysfunction  ASSESSMENT: NYHA class III symptoms with recurrent admissions. CXR shows cardiomegaly and pulmonary vascular congestion, suggstive of pulmonary edema secondary to CHF. BNP was 800. Cr was worse at 3.1    ECHO done on 7/5/2024:    Left Ventricle: Mild global hypokinesis present. Septal flattening in diastole and systole consistent with right ventricular volume and pressure overload. There is moderately reduced systolic function with a visually estimated ejection fraction of 35 - 40%. Grade I diastolic dysfunction. Estimated CO is 3.4 l/min    Right Ventricle: Severe right ventricular enlargement. Wall thickness is normal. Right ventricle wall motion has global hypokinesis. Systolic function is severely reduced.    Right Atrium: Right atrium is severely dilated.    Aortic Valve: The aortic valve is a trileaflet valve. There is mild aortic valve sclerosis. There is moderate annular calcification present.    Tricuspid Valve: There is moderate regurgitation with an anteromedial eccentrically directed jet.    Aorta: Aortic root is normal in size measuring 2.77 cm. Ascending aorta is normal measuring 2.51 cm.    Pulmonary Artery: There is severe pulmonary hypertension. The estimated pulmonary artery systolic pressure is 81 mmHg.    IVC/SVC: Elevated venous pressure at 15 mmHg.    CXR 7/07/2024:  COMPARISON:  Comparison made with previous examination dated July 4, 2024     FINDINGS:  Support devices: Unchanged.     Cardiomediastinal silhouette: No significant changes.     Hilar regions: Interval improvement of the central hilar congestion..     Lungs: Interval improvement of the interstitial alveolar opacities in both lung bases suggestive of edema.     Skeletal and soft  tissue: No significant changes.    RHC done on 1/31/2024  RA: 18/ 15/ 14 RV: 84/ 5/ 15 PA: 80/ 38/ 50 PWP: 19/ 18/ 15 .   Cardiac output was 3.74  by Atif. Cardiac index is 1.78 L/min/m2.       Thermodilution CO/CI 6.14/2.92 at rest  Likely reduced CO/CI based on Atif and in setting of moderate to severe tricuspid regurgitation.   Moderately elevated right and mildly elevated left sided filling pressure.   Severe pulmonary hypertension in setting of normal to mildly elevated left sided filling pressure at rest.   TPG  35   PVR  10    SVR 1610 at rest based on Atif calculation   MAP 81    With exercise PA pressures increased to 92/50, mean 60  PCWP increased to 25/26, 24.   Blood pressure 130/87, mean 100.     PLAN:     -- sodium restriction   -- 1.5L fluid restriction   -- Continue Lasix 5 mg/hr   -- Monitor electrolytes BID, replete as indicated   -- CO2 43, Cr 1.7   -- BID hemodynamics   -- Nitroglycerin gtt 50 mg for afterload reduction   -- CMP this afternoon, checking replete Mg and K    Paroxysmal A-fib  ASSESSMENT: On coumadin, goal INR 2-3.  PLAN:    -- Continue coumadin    MALLIKA (obstructive sleep apnea)  PLAN:    -- BiPAP QHS    Pulmonary hypertension  ASSESSMENT: Grade 2-3 pulmonary hypertension per pulmonology Dr. Child. RHC confirmed group 2. Adherent with diet, CPAP, continuous 3L oxygen at home. Currently on 50L HFNP, saturating 93%.  PLAN:  -- RV dysfunction treatment as above    Hypoventilation syndrome  PLAN:    -- BIPAP QHS once HFNP titrated        VTE Risk Mitigation (From admission, onward)              Ordered       warfarin (COUMADIN) tablet 5 mg  Once per day on Sunday Monday Tuesday Wednesday Friday Saturday 07/04/24 2349       IP VTE HIGH RISK PATIENT  Once         07/04/24 2302       Place sequential compression device  Until discontinued         07/04/24 2302                          Danuta Harrell  Medical Student (M4)  Cardiology CCU  [facility]

## 2024-07-08 NOTE — SUBJECTIVE & OBJECTIVE
"Interval History: Pt has severe Pulmonary HTN    Past Medical History:   Diagnosis Date    Arthritis     CHF (congestive heart failure)     Diastolic dysfunction    Cor pulmonale 11/05/2012    Gallstones     GERD (gastroesophageal reflux disease)     Hypertension     Morbid obesity 11/05/2012    Obesity hypoventilation syndrome     On home oxygen therapy 03/07/2014    MALLIKA (obstructive sleep apnea)     BPAP 16/11 with 3 L at night (continuous O2).    Paroxysmal atrial fibrillation     PFO (patent foramen ovale) 11/05/2012    status post closure    Pickwickian syndrome 11/05/2012    Pulmonary hypertension        Past Surgical History:   Procedure Laterality Date    CHOLECYSTECTOMY      RIGHT HEART CATHETERIZATION Right 03/22/2022    Procedure: INSERTION, CATHETER, RIGHT HEART;  Surgeon: Tony Martínez MD;  Location: [facility] CATH LAB;  Service: Cardiology;  Laterality: Right;    RIGHT HEART CATHETERIZATION Right 01/31/2024    Procedure: INSERTION, CATHETER, RIGHT HEART;  Surgeon: Sheri Ritter MD;  Location: [facility] CATH LAB;  Service: Cardiology;  Laterality: Right;    UPPER GASTROINTESTINAL ENDOSCOPY      2-3 years ago       Review of patient's allergies indicates:   Allergen Reactions    Lipitor [atorvastatin] Other (See Comments)     "it makes my legs feel like jelly"  Other reaction(s): causes legs to hurt       Medications:  Continuous Infusions:   furosemide (Lasix) 500 mg in 50 mL infusion (conc: 10 mg/mL)  5 mg/hr Intravenous Continuous 0.5 mL/hr at 07/08/24 0900 5 mg/hr at 07/08/24 0900    nitroGLYCERIN  0-400 mcg/min Intravenous Continuous 1.5 mL/hr at 07/08/24 1007 5 mcg/min at 07/08/24 1007     Scheduled Meds:   allopurinoL  300 mg Oral Daily    fluticasone-salmeterol 250-50 mcg/dose  1 puff Inhalation BID    pantoprazole  40 mg Oral Daily    tiotropium bromide  2 puff Inhalation Daily    warfarin  5 mg Oral Once per day on Sunday Monday Tuesday Wednesday Friday Saturday     PRN Meds:  Current " Facility-Administered Medications:     acetaminophen, 650 mg, Oral, Q6H PRN    albuterol, 2 puff, Inhalation, Q4H PRN    melatonin, 6 mg, Oral, Nightly PRN    sodium chloride 0.9%, 10 mL, Intravenous, PRN    Family History       Problem Relation (Age of Onset)    Arthritis Mother, Maternal Grandmother, Maternal Grandfather    Asthma Mother, Sister, Maternal Grandmother    Breast cancer Paternal Grandmother    Diabetes Maternal Grandmother    Down syndrome Brother    Gout Brother    Hypertension Father, Maternal Grandmother, Maternal Grandfather, Paternal Grandfather          Tobacco Use    Smoking status: Never    Smokeless tobacco: Never   Substance and Sexual Activity    Alcohol use: Yes     Comment: holidays  rare    Drug use: No    Sexual activity: Not Currently     Partners: Female       Review of Systems   Respiratory:  Positive for shortness of breath.      Objective:     Vital Signs (Most Recent):  Temp: 98 °F (36.7 °C) (07/08/24 0700)  Pulse: 107 (07/08/24 1000)  Resp: 17 (07/08/24 1000)  BP: (!) 113/56 (07/08/24 1000)  SpO2: (!) 89 % (07/08/24 1000) Vital Signs (24h Range):  Temp:  [97.2 °F (36.2 °C)-98.3 °F (36.8 °C)] 98 °F (36.7 °C)  Pulse:  [101-120] 107  Resp:  [11-42] 17  SpO2:  [89 %-99 %] 89 %  BP: ()/(51-71) 113/56     Weight: 108.3 kg (238 lb 10.4 oz)  Body mass index is 39.71 kg/m².       Physical Exam  Constitutional:       Interventions: Nasal cannula in place.      Comments: Comfort flow on at 35 L 45 %   HENT:      Head: Normocephalic and atraumatic.   Cardiovascular:      Rate and Rhythm: Normal rate.   Pulmonary:      Effort: Pulmonary effort is normal. No respiratory distress.      Comments: Pt on comfort flow O@ per NC  Abdominal:      General: There is distension.   Skin:     General: Skin is warm and dry.   Neurological:      Mental Status: He is alert and oriented to person, place, and time.   Psychiatric:         Behavior: Behavior is cooperative.            Review of  Symptoms      Symptom Assessment (ESAS 0-10 Scale)  Pain:  0  Dyspnea:  0  Anxiety:  0  Nausea:  0  Depression:  0  Anorexia:  0  Fatigue:  0  Insomnia:  0  Restlessness:  0  Agitation:  0         Performance Status:  70    Living Arrangements:  Lives alone    Psychosocial/Cultural:   See Palliative Psychosocial Note: No  Pt lives alone, no adult children. Pt's Dad lives in [facility] and pt's mother lives in [place]. Per pt, they are both aware of his medical issues they get along.   **Primary  to Follow**  Palliative Care  Consult: Yes        Advance Care Planning   Advance Care Planning       Significant Labs: All pertinent labs within the past 24 hours have been reviewed.  CBC:   Recent Labs   Lab 07/08/24 0243   WBC 7.12   HGB 14.2   HCT 49.8   MCV 87        BMP:  Recent Labs   Lab 07/08/24 0243   *      K 3.4*   CL 88*   CO2 41*   BUN 80*   CREATININE 1.9*   CALCIUM 9.1   MG 1.5*     LFT:  Lab Results   Component Value Date    AST 18 07/08/2024    ALKPHOS 104 07/08/2024    BILITOT 1.8 (H) 07/08/2024     Albumin:   Albumin   Date Value Ref Range Status   07/08/2024 2.6 (L) 3.5 - 5.2 g/dL Final     Protein:   Total Protein   Date Value Ref Range Status   07/08/2024 7.1 6.0 - 8.4 g/dL Final     Lactic acid:   Lab Results   Component Value Date    LACTATE 1.1 07/05/2024    LACTATE 1.1 04/22/2024       Significant Imaging: I have reviewed all pertinent imaging results/findings within the past 24 hours.

## 2024-07-08 NOTE — PROGRESS NOTES
[facility] - [facility]  Cardiology  Progress Note    Patient Name: Yong Mcintyre  MRN: [id number]  Admission Date: 7/4/2024  Hospital Length of Stay: 4 days  Code Status: Full Code   Attending Physician: Fermin Davis MD   Primary Care Physician: Gianni Escalona MD  Expected Discharge Date: 7/8/2024  Principal Problem:Right ventricular dysfunction    Subjective:     Hospital Course:   The patient was admitted to the CCU for further management of right-sided heart failure. He was diuresed with a lasix gtt. Electrolytes were monitored and repleted as indicated. Palliative care was consulted given his poor prognosis. The patient had significant O2 requirements on admission which were slow to wean.    Interval History: No acute overnight events. Electrolytes repleted. Currently on lasix gtt of 5. Patient still requiring significant amounts of O2 (Comfort flow 50L 70%). Will place central line for CVP monitoring.    Review of Systems   Constitutional: Positive for weight gain. Negative for fever.   HENT:  Negative for congestion.    Cardiovascular:  Negative for leg swelling, near-syncope, palpitations and syncope.   Respiratory:  Positive for shortness of breath.    Gastrointestinal:  Negative for abdominal pain and heartburn.   Genitourinary:  Negative for dysuria and hematuria.   Psychiatric/Behavioral:  The patient is not nervous/anxious.    All other systems reviewed and are negative.    Objective:     Vital Signs (Most Recent):  Temp: 98 °F (36.7 °C) (07/08/24 0700)  Pulse: 101 (07/08/24 0817)  Resp: 14 (07/08/24 0817)  BP: (!) 114/56 (07/08/24 0800)  SpO2: (!) 93 % (07/08/24 0817) Vital Signs (24h Range):  Temp:  [97.2 °F (36.2 °C)-98.3 °F (36.8 °C)] 98 °F (36.7 °C)  Pulse:  [101-120] 101  Resp:  [13-42] 14  SpO2:  [90 %-99 %] 93 %  BP: ()/(51-71) 114/56     Weight: 108.3 kg (238 lb 10.4 oz)  Body mass index is 39.71 kg/m².     SpO2: (!) 93 %         Intake/Output Summary (Last 24 hours) at  7/8/2024 0852  Last data filed at 7/8/2024 0800  Gross per 24 hour   Intake 833.43 ml   Output 1700 ml   Net -866.57 ml       Lines/Drains/Airways       Peripheral Intravenous Line  Duration                  Peripheral IV - Single Lumen 07/07/24 2007 20 G Left;Posterior Hand <1 day         Peripheral IV - Single Lumen 07/07/24 2018 20 G Posterior;Right Wrist <1 day                       Physical Exam  Vitals and nursing note reviewed.   Constitutional:       Appearance: Normal appearance.   HENT:      Right Ear: External ear normal.      Left Ear: External ear normal.      Mouth/Throat:      Mouth: Mucous membranes are moist.      Pharynx: No posterior oropharyngeal erythema.   Eyes:      Extraocular Movements: Extraocular movements intact.      Conjunctiva/sclera: Conjunctivae normal.   Cardiovascular:      Rate and Rhythm: Regular rhythm. Tachycardia present.   Pulmonary:      Effort: No respiratory distress.      Breath sounds: Rhonchi present. No wheezing.   Abdominal:      General: There is distension.   Musculoskeletal:      Right lower leg: Edema (trace) present.      Left lower leg: Edema (trace) present.   Skin:     General: Skin is warm.      Findings: No rash.   Neurological:      Mental Status: He is alert and oriented to person, place, and time. Mental status is at baseline.       Significant Labs: CMP   Recent Labs   Lab 07/06/24  1236 07/07/24  0236 07/08/24  0243    141 138   K 3.8 3.3* 3.4*   CL 89* 87* 88*   CO2 42* 43* 41*    116* 132*   BUN 86* 81* 80*   CREATININE 2.0* 2.0* 1.9*   CALCIUM 9.4 9.4 9.1   PROT  --  7.5 7.1   ALBUMIN  --  2.7* 2.6*   BILITOT  --  1.6* 1.8*   ALKPHOS  --  108 104   AST  --  19 18   ALT  --  10 9*   ANIONGAP 9 11 9   , CBC   Recent Labs   Lab 07/07/24  0236 07/08/24  0243   WBC 7.28 7.12   HGB 14.8 14.2   HCT 54.7* 49.8    174   , and All pertinent lab results from the last 24 hours have been reviewed.    Assessment and Plan:     * Right  ventricular dysfunction  NYHA class III symptoms with recurrent admissions.  He was seen by [facility] outpatient 6/2024 who state patient was feeling better on new diuretic regimen however worried about the frequency of use of metolazone and his worsening kidney function.       CXR with cardiomegaly and pulmonary vascular congestion, suggestive of pulmonary edema secondary to CHF. BNP elevated at 800. Creatinine worse at 3.1     started on lasix gtt at 20/hr. Hold home metolazone and will assess UOP response to see if should restart metolazone  Ordered repeat echo  Currently BP is stable, if decline or no improvement may consider, NE with Tenzin may help with RVF     Given his recurrent HF admissions and most recent hemodynamics, [facility] believes his overall prognosis is poor and recommended palliative care consult. Inpatient consult has been placed.     Recommend 2 gram sodium restriction and 1500cc fluid restriction.     Encourage physical activity with graded exercise program.    -- Place central line for CVP monitoring  -- Continue lasix gtt to 5; possibly increasing pending CVP as well as possible addition of acetazolamide  -- Inhaled nitro  -- Monitor lytes BID; replete as indicated  -- Will plan on central line to monitor CVP to guide diuresis    Paroxysmal A-fib  On coumadin.    -- Goal INR 2-3      MALLIKA (obstructive sleep apnea)  See 'hypoventilation syndrome'    Pulmonary hypertension  WHO group 2-3 per his managing pulmonologist (Danyell at [facility])     Adherent with diet, CPAP, and on continuous O2 3L at home. Currently requiring 50L High flow 70% O2  In spite of latter, he continues to be polycythemic.      -- RV dysfunction treatment as above    Hypoventilation syndrome  BIPAP QHS once HFNC titrated        VTE Risk Mitigation (From admission, onward)           Ordered     warfarin (COUMADIN) tablet 5 mg  Once per day on Sunday Monday Tuesday Wednesday Friday Saturday 07/04/24 2529     IP VTE HIGH RISK  PATIENT  Once         07/04/24 2302     Place sequential compression device  Until discontinued         07/04/24 2302                    Vik White MD  Cardiology  [facility]

## 2024-07-08 NOTE — RESPIRATORY THERAPY
FiO2 increased to 100% at this time on the AIRVO for central line placement. Will return to 60% when procedure is complete.

## 2024-07-08 NOTE — PROGRESS NOTES
[facility] - [facility]  Palliative Medicine  Progress Note    Patient Name: Yong Mcintyre  MRN: [id number]  Admission Date: 7/4/2024  Hospital Length of Stay: 4 days  Code Status: Full Code   Attending Provider: Fermin Davis MD  Consulting Provider: WINDY Rodriguez  Primary Care Physician: Gianni Escalona MD  Principal Problem:Right ventricular dysfunction    Patient information was obtained from patient and primary team.      Assessment/Plan:     Palliative Care  Palliative care encounter  Impression: Pt is a 49 y/o male with severe Pulmonary HTN.Pt is AAOx4. Pt is on high flow O2 50 L @ 65%.     Reason for consult: GOC/ACP. Communicated with Team.     GOC/ACP:     Today: Met with pt who is on comfort flow O2 50 L 65%. Pt to have central line placed today. Pt's goal is to go back home. We discussed barrier to home needing to be on comfort flow. Pt is hopeful he can be diuresed more to help wean O2 down.     Pal care will continue to follow.       7/5/24  Met with pt who is sitting up in bed. Pt is aware he has pulmonary HTN and that he will continue to decline from disease. Per pt, he sees Dr. Child for his Pulmonary HTN.  Per pt his goal is to continue medical treatment for his PHTN.      Pt open to continue pal care f/u. Pt's reports he lives by himself and his parents are [place]. Father lives [facility] and mother lives in [place]. Per pt they are aware of his medical issues.   Per pt if unable to make his own medical decisions he wants his parents to make them.     Symptom management:     Dyspnea:   Pt on high flow O2 at 35 L 45%  Pt reports no dyspnea on O2.     Pt denies pain, anxiety.     Plan:   Pal care will continue to follow.               I will follow-up with patient. Please contact us if you have any additional questions.    Subjective:     Chief Complaint:   Chief Complaint   Patient presents with    Leg Swelling     On home oxygen 3l, gave self 3 puffs albuterol in triage,  said got sob with walking        HPI:   Pt is a 48 y.o. male with severe pulmonary HTN (Group 2-3, on 3L home O2, diagnosed prior to 2015, follows with Dr. Child at [facility], MALLIKA, Obesity hypoventilation syndrome, HFpEF, Paroxysmal AFib (on Coumadin), BMI > 35, HTN who presents for worsening shortness of breath and lower extremity swelling over the past 2 weeks. This is his 5th admission this year. He reports compliance with his medications. His diuretic regimen was adjusted to the following; Torsemide 60 mg twice daily and metolazone 2.5 on M/WF/Sunday. Clinically reports NYHA class III symptoms. He reports his dry weight is 223lbs and is currently weighing in at 245lbs. He uses 3L of O2 at home and is currently on high flow 35L at 45%     Hospital Course:  No notes on file    Interval History: Pt has severe Pulmonary HTN    Past Medical History:   Diagnosis Date    Arthritis     CHF (congestive heart failure)     Diastolic dysfunction    Cor pulmonale 11/05/2012    Gallstones     GERD (gastroesophageal reflux disease)     Hypertension     Morbid obesity 11/05/2012    Obesity hypoventilation syndrome     On home oxygen therapy 03/07/2014    MALLIKA (obstructive sleep apnea)     BPAP 16/11 with 3 L at night (continuous O2).    Paroxysmal atrial fibrillation     PFO (patent foramen ovale) 11/05/2012    status post closure    Pickwickian syndrome 11/05/2012    Pulmonary hypertension        Past Surgical History:   Procedure Laterality Date    CHOLECYSTECTOMY      RIGHT HEART CATHETERIZATION Right 03/22/2022    Procedure: INSERTION, CATHETER, RIGHT HEART;  Surgeon: Tony Martínez MD;  Location: [facility] CATH LAB;  Service: Cardiology;  Laterality: Right;    RIGHT HEART CATHETERIZATION Right 01/31/2024    Procedure: INSERTION, CATHETER, RIGHT HEART;  Surgeon: Sheri Ritter MD;  Location: [facility] CATH LAB;  Service: Cardiology;  Laterality: Right;    UPPER GASTROINTESTINAL ENDOSCOPY      2-3 years ago       Review of patient's  "allergies indicates:   Allergen Reactions    Lipitor [atorvastatin] Other (See Comments)     "it makes my legs feel like jelly"  Other reaction(s): causes legs to hurt       Medications:  Continuous Infusions:   furosemide (Lasix) 500 mg in 50 mL infusion (conc: 10 mg/mL)  5 mg/hr Intravenous Continuous 0.5 mL/hr at 07/08/24 0900 5 mg/hr at 07/08/24 0900    nitroGLYCERIN  0-400 mcg/min Intravenous Continuous 1.5 mL/hr at 07/08/24 1007 5 mcg/min at 07/08/24 1007     Scheduled Meds:   allopurinoL  300 mg Oral Daily    fluticasone-salmeterol 250-50 mcg/dose  1 puff Inhalation BID    pantoprazole  40 mg Oral Daily    tiotropium bromide  2 puff Inhalation Daily    warfarin  5 mg Oral Once per day on Sunday Monday Tuesday Wednesday Friday Saturday     PRN Meds:  Current Facility-Administered Medications:     acetaminophen, 650 mg, Oral, Q6H PRN    albuterol, 2 puff, Inhalation, Q4H PRN    melatonin, 6 mg, Oral, Nightly PRN    sodium chloride 0.9%, 10 mL, Intravenous, PRN    Family History       Problem Relation (Age of Onset)    Arthritis Mother, Maternal Grandmother, Maternal Grandfather    Asthma Mother, Sister, Maternal Grandmother    Breast cancer Paternal Grandmother    Diabetes Maternal Grandmother    Down syndrome Brother    Gout Brother    Hypertension Father, Maternal Grandmother, Maternal Grandfather, Paternal Grandfather          Tobacco Use    Smoking status: Never    Smokeless tobacco: Never   Substance and Sexual Activity    Alcohol use: Yes     Comment: holidays  rare    Drug use: No    Sexual activity: Not Currently     Partners: Female       Review of Systems   Respiratory:  Positive for shortness of breath.      Objective:     Vital Signs (Most Recent):  Temp: 98 °F (36.7 °C) (07/08/24 0700)  Pulse: 107 (07/08/24 1000)  Resp: 17 (07/08/24 1000)  BP: (!) 113/56 (07/08/24 1000)  SpO2: (!) 89 % (07/08/24 1000) Vital Signs (24h Range):  Temp:  [97.2 °F (36.2 °C)-98.3 °F (36.8 °C)] 98 °F (36.7 °C)  Pulse:  " [101-120] 107  Resp:  [11-42] 17  SpO2:  [89 %-99 %] 89 %  BP: ()/(51-71) 113/56     Weight: 108.3 kg (238 lb 10.4 oz)  Body mass index is 39.71 kg/m².       Physical Exam  Constitutional:       Interventions: Nasal cannula in place.      Comments: Comfort flow on at 35 L 45 %   HENT:      Head: Normocephalic and atraumatic.   Cardiovascular:      Rate and Rhythm: Normal rate.   Pulmonary:      Effort: Pulmonary effort is normal. No respiratory distress.      Comments: Pt on comfort flow O@ per NC  Abdominal:      General: There is distension.   Skin:     General: Skin is warm and dry.   Neurological:      Mental Status: He is alert and oriented to person, place, and time.   Psychiatric:         Behavior: Behavior is cooperative.            Review of Symptoms      Symptom Assessment (ESAS 0-10 Scale)  Pain:  0  Dyspnea:  0  Anxiety:  0  Nausea:  0  Depression:  0  Anorexia:  0  Fatigue:  0  Insomnia:  0  Restlessness:  0  Agitation:  0         Performance Status:  70    Living Arrangements:  Lives alone    Psychosocial/Cultural:   See Palliative Psychosocial Note: No  Pt lives alone, no adult children. Pt's Dad lives in [facility] and pt's mother lives in [place]. Per pt, they are both aware of his medical issues they get along.   **Primary  to Follow**  Palliative Care  Consult: Yes        Advance Care Planning  Advance Care Planning       Significant Labs: All pertinent labs within the past 24 hours have been reviewed.  CBC:   Recent Labs   Lab 07/08/24  0243   WBC 7.12   HGB 14.2   HCT 49.8   MCV 87        BMP:  Recent Labs   Lab 07/08/24  0243   *      K 3.4*   CL 88*   CO2 41*   BUN 80*   CREATININE 1.9*   CALCIUM 9.1   MG 1.5*     LFT:  Lab Results   Component Value Date    AST 18 07/08/2024    ALKPHOS 104 07/08/2024    BILITOT 1.8 (H) 07/08/2024     Albumin:   Albumin   Date Value Ref Range Status   07/08/2024 2.6 (L) 3.5 - 5.2 g/dL Final     Protein:   Total  Protein   Date Value Ref Range Status   07/08/2024 7.1 6.0 - 8.4 g/dL Final     Lactic acid:   Lab Results   Component Value Date    LACTATE 1.1 07/05/2024    LACTATE 1.1 04/22/2024       Significant Imaging: I have reviewed all pertinent imaging results/findings within the past 24 hours.    > 50% of 55  min visit spent in chart review, face to face discussion of goals of care,  symptom assessment, charting,  coordination of care and emotional support     Lisa Brito, CNS  Palliative Medicine  [facility] - Cardiac Intensive Care

## 2024-07-08 NOTE — ASSESSMENT & PLAN NOTE
WHO group 2-3 per his managing pulmonologist (Danyell at [facility])     Adherent with diet, CPAP, and on continuous O2 3L at home. Currently requiring 50L High flow 70% O2  In spite of latter, he continues to be polycythemic.      -- RV dysfunction treatment as above

## 2024-07-08 NOTE — PLAN OF CARE
CICU DAILY GOALS       A: Awake    RASS: Goal - RASS Goal: 0-->alert and calm  Actual - RASS (Carter Agitation-Sedation Scale): alert and calm   Restraint necessity:    B: Breath   SBT: Not intubated   C: Coordinate A & B, analgesics/sedatives   Pain: managed    SAT: Not intubated  D: Delirium   CAM-ICU: Overall CAM-ICU: Negative  E: Early(intubated/ Progressive (non-intubated) Mobility     Activity: Activity Management: Rolling - L1  FAS: Feeding/Nutrition   Diet order: Diet/Nutrition Received: low saturated fat/low cholesterol,   Fluid restriction:       T: Thrombus   DVT prophylaxis: VTE Required Core Measure: Pharmacological prophylaxis initiated/maintained  H: HOB Elevation   Head of Bed (HOB) Positioning: HOB elevated  U: Ulcer Prophylaxis   GI: yes  G: Glucose control   managed      S: Skin   Nurses Note -- 4 Eyes  Skin assessed during: Q Shift Change   CHOOSE ONE:  [] No Altered Skin Integrity Present      []Prevention Measures Documented    [x] Yes- Altered Skin Integrity Present or Discovered     [] LDA Added if Not in Epic (Describe Wound)     [] New Altered Skin Integrity was Present on Admit and Documented in LDA     [] Wound Image Taken  Wound Care Consulted? Yes  Attending Nurse:  Corina Natarajan RN/Staff Member:  Yes    B: Bowel Function   no issues   I: Indwelling Catheters   Dunne necessity:     CVC necessity: No   IPAD offered: No    Plan for the day   Possible RHC today.     Family/Goals of care/Code Status   Code Status: Full Code     No acute events throughout day, VS and assessment per flow sheet, patient progressing towards goals as tolerated, plan of care reviewed with Yong Mcintyre and family, all concerns addressed, will continue to monitor.   Problem: Wound  Goal: Optimal Functional Ability  Outcome: Progressing     Problem: Wound  Goal: Optimal Pain Control and Function  Outcome: Progressing

## 2024-07-09 LAB
ALBUMIN SERPL BCP-MCNC: 2.5 G/DL (ref 3.5–5.2)
ALLENS TEST: ABNORMAL
ALLENS TEST: ABNORMAL
ALP SERPL-CCNC: 101 U/L (ref 55–135)
ALT SERPL W/O P-5'-P-CCNC: 9 U/L (ref 10–44)
ANION GAP SERPL CALC-SCNC: 6 MMOL/L (ref 8–16)
ANION GAP SERPL CALC-SCNC: 7 MMOL/L (ref 8–16)
AST SERPL-CCNC: 22 U/L (ref 10–40)
BASOPHILS # BLD AUTO: 0.04 K/UL (ref 0–0.2)
BASOPHILS NFR BLD: 0.6 % (ref 0–1.9)
BILIRUB SERPL-MCNC: 1.7 MG/DL (ref 0.1–1)
BUN SERPL-MCNC: 71 MG/DL (ref 6–20)
BUN SERPL-MCNC: 75 MG/DL (ref 6–20)
CALCIUM SERPL-MCNC: 9 MG/DL (ref 8.7–10.5)
CALCIUM SERPL-MCNC: 9.1 MG/DL (ref 8.7–10.5)
CHLORIDE SERPL-SCNC: 89 MMOL/L (ref 95–110)
CHLORIDE SERPL-SCNC: 89 MMOL/L (ref 95–110)
CO2 SERPL-SCNC: 43 MMOL/L (ref 23–29)
CO2 SERPL-SCNC: 43 MMOL/L (ref 23–29)
CREAT SERPL-MCNC: 1.7 MG/DL (ref 0.5–1.4)
CREAT SERPL-MCNC: 1.7 MG/DL (ref 0.5–1.4)
DIFFERENTIAL METHOD BLD: ABNORMAL
EOSINOPHIL # BLD AUTO: 0 K/UL (ref 0–0.5)
EOSINOPHIL NFR BLD: 0 % (ref 0–8)
ERYTHROCYTE [DISTWIDTH] IN BLOOD BY AUTOMATED COUNT: 21.6 % (ref 11.5–14.5)
EST. GFR  (NO RACE VARIABLE): 49.1 ML/MIN/1.73 M^2
EST. GFR  (NO RACE VARIABLE): 49.1 ML/MIN/1.73 M^2
GLUCOSE SERPL-MCNC: 103 MG/DL (ref 70–110)
GLUCOSE SERPL-MCNC: 119 MG/DL (ref 70–110)
HCO3 UR-SCNC: 48.9 MMOL/L (ref 24–28)
HCT VFR BLD AUTO: 51.5 % (ref 40–54)
HGB BLD-MCNC: 14.1 G/DL (ref 14–18)
IMM GRANULOCYTES # BLD AUTO: 0.01 K/UL (ref 0–0.04)
IMM GRANULOCYTES NFR BLD AUTO: 0.2 % (ref 0–0.5)
INR PPP: 1.5 (ref 0.8–1.2)
LYMPHOCYTES # BLD AUTO: 0.8 K/UL (ref 1–4.8)
LYMPHOCYTES NFR BLD: 12.8 % (ref 18–48)
MAGNESIUM SERPL-MCNC: 2.2 MG/DL (ref 1.6–2.6)
MCH RBC QN AUTO: 23.5 PG (ref 27–31)
MCHC RBC AUTO-ENTMCNC: 27.4 G/DL (ref 32–36)
MCV RBC AUTO: 86 FL (ref 82–98)
MONOCYTES # BLD AUTO: 0.5 K/UL (ref 0.3–1)
MONOCYTES NFR BLD: 7.9 % (ref 4–15)
NEUTROPHILS # BLD AUTO: 5 K/UL (ref 1.8–7.7)
NEUTROPHILS NFR BLD: 78.5 % (ref 38–73)
NRBC BLD-RTO: 0 /100 WBC
PCO2 BLDA: 87 MMHG (ref 35–45)
PH SMN: 7.36 [PH] (ref 7.35–7.45)
PHOSPHATE SERPL-MCNC: 3.4 MG/DL (ref 2.7–4.5)
PLATELET # BLD AUTO: 185 K/UL (ref 150–450)
PMV BLD AUTO: 9.6 FL (ref 9.2–12.9)
PO2 BLDA: 33 MMHG (ref 40–60)
PO2 BLDA: 39 MMHG (ref 40–60)
POC BE: 23 MMOL/L
POC SATURATED O2: 55 % (ref 95–100)
POC SATURATED O2: 63 % (ref 95–100)
POC TCO2: >50 MMOL/L (ref 24–29)
POTASSIUM SERPL-SCNC: 3.5 MMOL/L (ref 3.5–5.1)
POTASSIUM SERPL-SCNC: 3.7 MMOL/L (ref 3.5–5.1)
PROT SERPL-MCNC: 7.3 G/DL (ref 6–8.4)
PROTHROMBIN TIME: 16.1 SEC (ref 9–12.5)
RBC # BLD AUTO: 6.01 M/UL (ref 4.6–6.2)
SAMPLE: ABNORMAL
SAMPLE: ABNORMAL
SITE: ABNORMAL
SITE: ABNORMAL
SODIUM SERPL-SCNC: 138 MMOL/L (ref 136–145)
SODIUM SERPL-SCNC: 139 MMOL/L (ref 136–145)
WBC # BLD AUTO: 6.31 K/UL (ref 3.9–12.7)

## 2024-07-09 PROCEDURE — 80048 BASIC METABOLIC PNL TOTAL CA: CPT | Mod: XB

## 2024-07-09 PROCEDURE — 85610 PROTHROMBIN TIME: CPT

## 2024-07-09 PROCEDURE — 83735 ASSAY OF MAGNESIUM: CPT

## 2024-07-09 PROCEDURE — 99900035 HC TECH TIME PER 15 MIN (STAT)

## 2024-07-09 PROCEDURE — 25000003 PHARM REV CODE 250: Performed by: INTERNAL MEDICINE

## 2024-07-09 PROCEDURE — 84100 ASSAY OF PHOSPHORUS: CPT

## 2024-07-09 PROCEDURE — 99232 SBSQ HOSP IP/OBS MODERATE 35: CPT | Mod: GC,,, | Performed by: INTERNAL MEDICINE

## 2024-07-09 PROCEDURE — 25000003 PHARM REV CODE 250: Performed by: STUDENT IN AN ORGANIZED HEALTH CARE EDUCATION/TRAINING PROGRAM

## 2024-07-09 PROCEDURE — 27100171 HC OXYGEN HIGH FLOW UP TO 24 HOURS

## 2024-07-09 PROCEDURE — 94640 AIRWAY INHALATION TREATMENT: CPT

## 2024-07-09 PROCEDURE — 63600175 PHARM REV CODE 636 W HCPCS: Performed by: STUDENT IN AN ORGANIZED HEALTH CARE EDUCATION/TRAINING PROGRAM

## 2024-07-09 PROCEDURE — 25000003 PHARM REV CODE 250

## 2024-07-09 PROCEDURE — 94660 CPAP INITIATION&MGMT: CPT

## 2024-07-09 PROCEDURE — 80053 COMPREHEN METABOLIC PANEL: CPT

## 2024-07-09 PROCEDURE — 94761 N-INVAS EAR/PLS OXIMETRY MLT: CPT

## 2024-07-09 PROCEDURE — 20000000 HC ICU ROOM

## 2024-07-09 PROCEDURE — 85025 COMPLETE CBC W/AUTO DIFF WBC: CPT

## 2024-07-09 RX ORDER — WARFARIN SODIUM 5 MG/1
5 TABLET ORAL DAILY
Status: DISCONTINUED | OUTPATIENT
Start: 2024-07-10 | End: 2024-07-10

## 2024-07-09 RX ORDER — NITROGLYCERIN 20 MG/100ML
0-400 INJECTION INTRAVENOUS CONTINUOUS
Status: DISCONTINUED | OUTPATIENT
Start: 2024-07-09 | End: 2024-07-09

## 2024-07-09 RX ORDER — POTASSIUM CHLORIDE 20 MEQ/1
40 TABLET, EXTENDED RELEASE ORAL ONCE
Status: COMPLETED | OUTPATIENT
Start: 2024-07-09 | End: 2024-07-09

## 2024-07-09 RX ORDER — POTASSIUM CHLORIDE 29.8 MG/ML
40 INJECTION INTRAVENOUS ONCE
Status: COMPLETED | OUTPATIENT
Start: 2024-07-09 | End: 2024-07-09

## 2024-07-09 RX ADMIN — NITROGLYCERIN 5 MCG/MIN: 20 INJECTION INTRAVENOUS at 09:07

## 2024-07-09 RX ADMIN — ALLOPURINOL 300 MG: 100 TABLET ORAL at 08:07

## 2024-07-09 RX ADMIN — FUROSEMIDE 15 MG/HR: 10 INJECTION, SOLUTION INTRAMUSCULAR; INTRAVENOUS at 07:07

## 2024-07-09 RX ADMIN — FLUTICASONE PROPIONATE AND SALMETEROL 1 PUFF: 50; 250 POWDER RESPIRATORY (INHALATION) at 07:07

## 2024-07-09 RX ADMIN — SODIUM NITROPRUSSIDE 0.3 MCG/KG/MIN: 50 INJECTION INTRAVENOUS at 10:07

## 2024-07-09 RX ADMIN — DEXTROSE MONOHYDRATE 500 MG: 50 INJECTION, SOLUTION INTRAVENOUS at 10:07

## 2024-07-09 RX ADMIN — POTASSIUM CHLORIDE 40 MEQ: 1500 TABLET, EXTENDED RELEASE ORAL at 02:07

## 2024-07-09 RX ADMIN — POTASSIUM CHLORIDE 40 MEQ: 29.8 INJECTION, SOLUTION INTRAVENOUS at 03:07

## 2024-07-09 RX ADMIN — PANTOPRAZOLE SODIUM 40 MG: 40 TABLET, DELAYED RELEASE ORAL at 08:07

## 2024-07-09 RX ADMIN — WARFARIN SODIUM 7.5 MG: 2.5 TABLET ORAL at 05:07

## 2024-07-09 NOTE — PLAN OF CARE
Problem: Adult Inpatient Plan of Care  Goal: Plan of Care Review  Outcome: Progressing  Goal: Patient-Specific Goal (Individualized)  Outcome: Progressing  Goal: Absence of Hospital-Acquired Illness or Injury  Outcome: Progressing  Goal: Optimal Comfort and Wellbeing  Outcome: Progressing  Goal: Readiness for Transition of Care  Outcome: Progressing     Problem: Bariatric Environmental Safety  Goal: Safety Maintained with Care  Outcome: Progressing     Problem: Coping Ineffective  Goal: Effective Coping  Outcome: Progressing

## 2024-07-09 NOTE — ASSESSMENT & PLAN NOTE
NYHA class III symptoms with recurrent admissions.  He was seen by [facility] outpatient 6/2024 who state patient was feeling better on new diuretic regimen however worried about the frequency of use of metolazone and his worsening kidney function.       CXR with cardiomegaly and pulmonary vascular congestion, suggestive of pulmonary edema secondary to CHF. BNP elevated at 800. Creatinine worse at 3.1     started on lasix gtt at 20/hr. Hold home metolazone and will assess UOP response to see if should restart metolazone     Given his recurrent HF admissions and most recent hemodynamics, [facility] believes his overall prognosis is poor and recommended palliative care consult. Inpatient consult has been placed.     2 gram sodium restriction and 1500cc fluid restriction.  Encourage physical activity with graded exercise program.    -- Place central line for CVP monitoring  -- Continue lasix gtt to 15  -- Add acetazolamide  -- Nitroprusside gtt  -- Monitor lytes BID; replete as indicated  -- Will plan on central line to monitor CVP to guide diuresis

## 2024-07-09 NOTE — SUBJECTIVE & OBJECTIVE
Interval History: No acute overnight events. Acetazolamide added to diuretic regimen due to his metabolic alkalosis in the setting of diuresis. Nitroprusside added for afterload reduction.    Review of Systems   Constitutional: Positive for weight gain. Negative for fever.   HENT:  Negative for congestion.    Cardiovascular:  Negative for leg swelling, near-syncope, palpitations and syncope.   Respiratory:  Positive for shortness of breath.    Gastrointestinal:  Negative for abdominal pain and heartburn.   Genitourinary:  Negative for dysuria and hematuria.   Psychiatric/Behavioral:  The patient is not nervous/anxious.    All other systems reviewed and are negative.    Objective:     Vital Signs (Most Recent):  Temp: 98 °F (36.7 °C) (07/09/24 0700)  Pulse: 105 (07/09/24 1200)  Resp: (!) 24 (07/09/24 1200)  BP: 111/68 (07/09/24 1200)  SpO2: 97 % (07/09/24 1200) Vital Signs (24h Range):  Temp:  [97.3 °F (36.3 °C)-98.3 °F (36.8 °C)] 98 °F (36.7 °C)  Pulse:  [101-114] 105  Resp:  [14-32] 24  SpO2:  [90 %-99 %] 97 %  BP: ()/(51-82) 111/68     Weight: 105.3 kg (232 lb 4.1 oz)  Body mass index is 38.65 kg/m².     SpO2: 97 %         Intake/Output Summary (Last 24 hours) at 7/9/2024 1306  Last data filed at 7/9/2024 1200  Gross per 24 hour   Intake 1182.34 ml   Output 3600 ml   Net -2417.66 ml       Lines/Drains/Airways       Central Venous Catheter Line  Duration             Percutaneous Central Line - Triple Lumen  07/08/24 1145 Internal Jugular Right 1 day              Peripheral Intravenous Line  Duration                  Peripheral IV - Single Lumen 07/07/24 2007 20 G Left;Posterior Hand 1 day         Peripheral IV - Single Lumen 07/07/24 2018 20 G Posterior;Right Wrist 1 day                       Physical Exam  Vitals and nursing note reviewed.   Constitutional:       Appearance: Normal appearance.   HENT:      Right Ear: External ear normal.      Left Ear: External ear normal.      Mouth/Throat:      Mouth:  Mucous membranes are moist.      Pharynx: No posterior oropharyngeal erythema.   Eyes:      Extraocular Movements: Extraocular movements intact.      Conjunctiva/sclera: Conjunctivae normal.   Cardiovascular:      Rate and Rhythm: Regular rhythm. Tachycardia present.   Pulmonary:      Effort: No respiratory distress.      Breath sounds: Rhonchi present. No wheezing.   Abdominal:      General: There is distension.   Musculoskeletal:      Right lower leg: Edema (2+) present.      Left lower leg: Edema (2+) present.   Skin:     General: Skin is warm.      Findings: No rash.   Neurological:      Mental Status: He is alert and oriented to person, place, and time. Mental status is at baseline.        Significant Labs: CMP   Recent Labs   Lab 07/08/24  0243 07/08/24  1318 07/09/24  0116    139 138   K 3.4* 3.6 3.5   CL 88* 88* 89*   CO2 41* 44* 43*   * 120* 119*   BUN 80* 76* 75*   CREATININE 1.9* 1.7* 1.7*   CALCIUM 9.1 8.9 9.1   PROT 7.1  --  7.3   ALBUMIN 2.6*  --  2.5*   BILITOT 1.8*  --  1.7*   ALKPHOS 104  --  101   AST 18  --  22   ALT 9*  --  9*   ANIONGAP 9 7* 6*   , CBC   Recent Labs   Lab 07/08/24  0243 07/09/24  0116   WBC 7.12 6.31   HGB 14.2 14.1   HCT 49.8 51.5    185   , and All pertinent lab results from the last 24 hours have been reviewed.

## 2024-07-09 NOTE — PLAN OF CARE
[facility] - Cardiac Int[facility] Care  Discharge Reassessment    Primary Care Provider: Gianni Escalona MD    Expected Discharge Date: 7/12/2024    Reassessment (most recent)       Discharge Reassessment - 07/09/24 1421          Discharge Reassessment    Assessment Type Discharge Planning Reassessment     Discharge Plan discussed with: Patient;Parent(s);Sibling     Communicated ESTEBAN with patient/caregiver Date not available/Unable to determine     Discharge Plan A Home     Discharge Plan B Home Health     DME Needed Upon Discharge  none     Transition of Care Barriers None     Why the patient remains in the hospital Requires continued medical care                   SW met with pt, sister, and mother at bedside.  Pt is not yet medically stable for discharge.  Discharge Plan A and Plan B have been determined by review of patient's clinical status, future medical and therapeutic needs, and coverage/benefits for post-acute care in coordination with multidisciplinary team members.  Will continue to follow.      Elisa Olivo LMSW  [facility]  [id number]

## 2024-07-09 NOTE — PLAN OF CARE
CICU DAILY GOALS       CVP: 18, 16, 17  Svo2: 60  Pt's BP trending upward. Per MD Jose, if bp continues to rise, will restart nitro gtt. OK to monitor BP for now.   A: Awake    RASS: Goal - RASS Goal: 0-->alert and calm  Actual - RASS (Carter Agitation-Sedation Scale): alert and calm   Restraint necessity:    B: Breath   SBT: Not intubated   C: Coordinate A & B, analgesics/sedatives   Pain: managed    SAT: Not intubated  D: Delirium   CAM-ICU: Overall CAM-ICU: Negative  E: Early(intubated/ Progressive (non-intubated) Mobility     Activity: Activity Management: Rolling - L1  FAS: Feeding/Nutrition   Diet order: Diet/Nutrition Received: low saturated fat/low cholesterol,   Fluid restriction:       T: Thrombus   DVT prophylaxis: VTE Required Core Measure: Pharmacological prophylaxis initiated/maintained  H: HOB Elevation   Head of Bed (HOB) Positioning: HOB elevated  U: Ulcer Prophylaxis   GI: yes  G: Glucose control   managed      S: Skin   Nurses Note -- 4 Eyes  Skin assessed during: Q Shift Change   CHOOSE ONE:  [x] No Altered Skin Integrity Present      [x]Prevention Measures Documented    [] Yes- Altered Skin Integrity Present or Discovered     [] LDA Added if Not in Epic (Describe Wound)     [] New Altered Skin Integrity was Present on Admit and Documented in LDA     [] Wound Image Taken  Wound Care Consulted? No  Attending Nurse:  Corina Natarajan RN/Staff Member:  Yes    B: Bowel Function   no issues   I: Indwelling Catheters   Dunne necessity:     CVC necessity: Yes   IPAD offered: No      Family/Goals of care/Code Status   Code Status: Full Code     No acute events throughout day, VS and assessment per flow sheet, patient progressing towards goals as tolerated, plan of care reviewed with Yong Mcintyre and family, all concerns addressed, will continue to monitor.   Problem: Coping Ineffective  Goal: Effective Coping  Outcome: Progressing     Problem: Wound  Goal: Optimal Pain Control and Function  Outcome:  Progressing

## 2024-07-09 NOTE — NURSING
Pt report given to receiving RN; Pt plan of care discussed; Pt VSS; Pt has no c/o pain or discomfort at this time; Pt family updated; Pt bed locked + lowered, Srx3, + call bell within reach.

## 2024-07-09 NOTE — ASSESSMENT & PLAN NOTE
WHO group 2-3 per his managing pulmonologist (Danyell at [facility])    Adherent with diet, CPAP, and on continuous O2 3L at home. Currently requiring 50L High flow 70% O2  In spite of latter, he continues to be polycythemic.     -- RV dysfunction treatment as above

## 2024-07-09 NOTE — PROGRESS NOTES
[facility] Card[facility]  Cardiology  Progress Note    Patient Name: Yong Mcintyre  MRN: [id number]  Admission Date: 7/4/2024  Hospital Length of Stay: 5 days  Code Status: Full Code   Attending Physician: Fermin Davis MD   Primary Care Physician: Gianni Escalona MD  Expected Discharge Date: 7/12/2024  Principal Problem:Right ventricular dysfunction    Subjective:     Hospital Course:   The patient was admitted to the CCU for further management of right-sided heart failure. He was diuresed with a lasix gtt. Electrolytes were monitored and repleted as indicated. Palliative care was consulted given his poor prognosis. The patient had significant O2 requirements on admission which were slow to wean. A central line was placed for close CVP monitoring in the setting of aggressive diuresis.    Interval History: No acute overnight events. Acetazolamide added to diuretic regimen due to his metabolic alkalosis in the setting of diuresis. Nitroprusside added for afterload reduction.    Review of Systems   Constitutional: Positive for weight gain. Negative for fever.   HENT:  Negative for congestion.    Cardiovascular:  Negative for leg swelling, near-syncope, palpitations and syncope.   Respiratory:  Positive for shortness of breath.    Gastrointestinal:  Negative for abdominal pain and heartburn.   Genitourinary:  Negative for dysuria and hematuria.   Psychiatric/Behavioral:  The patient is not nervous/anxious.    All other systems reviewed and are negative.    Objective:     Vital Signs (Most Recent):  Temp: 98 °F (36.7 °C) (07/09/24 0700)  Pulse: 105 (07/09/24 1200)  Resp: (!) 24 (07/09/24 1200)  BP: 111/68 (07/09/24 1200)  SpO2: 97 % (07/09/24 1200) Vital Signs (24h Range):  Temp:  [97.3 °F (36.3 °C)-98.3 °F (36.8 °C)] 98 °F (36.7 °C)  Pulse:  [101-114] 105  Resp:  [14-32] 24  SpO2:  [90 %-99 %] 97 %  BP: ()/(51-82) 111/68     Weight: 105.3 kg (232 lb 4.1 oz)  Body mass index is 38.65 kg/m².      SpO2: 97 %         Intake/Output Summary (Last 24 hours) at 7/9/2024 1306  Last data filed at 7/9/2024 1200  Gross per 24 hour   Intake 1182.34 ml   Output 3600 ml   Net -2417.66 ml       Lines/Drains/Airways       Central Venous Catheter Line  Duration             Percutaneous Central Line - Triple Lumen  07/08/24 1145 Internal Jugular Right 1 day              Peripheral Intravenous Line  Duration                  Peripheral IV - Single Lumen 07/07/24 2007 20 G Left;Posterior Hand 1 day         Peripheral IV - Single Lumen 07/07/24 2018 20 G Posterior;Right Wrist 1 day                       Physical Exam  Vitals and nursing note reviewed.   Constitutional:       Appearance: Normal appearance.   HENT:      Right Ear: External ear normal.      Left Ear: External ear normal.      Mouth/Throat:      Mouth: Mucous membranes are moist.      Pharynx: No posterior oropharyngeal erythema.   Eyes:      Extraocular Movements: Extraocular movements intact.      Conjunctiva/sclera: Conjunctivae normal.   Cardiovascular:      Rate and Rhythm: Regular rhythm. Tachycardia present.   Pulmonary:      Effort: No respiratory distress.      Breath sounds: Rhonchi present. No wheezing.   Abdominal:      General: There is distension.   Musculoskeletal:      Right lower leg: Edema (2+) present.      Left lower leg: Edema (2+) present.   Skin:     General: Skin is warm.      Findings: No rash.   Neurological:      Mental Status: He is alert and oriented to person, place, and time. Mental status is at baseline.        Significant Labs: CMP   Recent Labs   Lab 07/08/24  0243 07/08/24  1318 07/09/24  0116    139 138   K 3.4* 3.6 3.5   CL 88* 88* 89*   CO2 41* 44* 43*   * 120* 119*   BUN 80* 76* 75*   CREATININE 1.9* 1.7* 1.7*   CALCIUM 9.1 8.9 9.1   PROT 7.1  --  7.3   ALBUMIN 2.6*  --  2.5*   BILITOT 1.8*  --  1.7*   ALKPHOS 104  --  101   AST 18  --  22   ALT 9*  --  9*   ANIONGAP 9 7* 6*   , CBC   Recent Labs   Lab  07/08/24  0243 07/09/24  0116   WBC 7.12 6.31   HGB 14.2 14.1   HCT 49.8 51.5    185   , and All pertinent lab results from the last 24 hours have been reviewed.    Assessment and Plan:     * Right ventricular dysfunction  NYHA class III symptoms with recurrent admissions.  He was seen by [facility] outpatient 6/2024 who state patient was feeling better on new diuretic regimen however worried about the frequency of use of metolazone and his worsening kidney function.       CXR with cardiomegaly and pulmonary vascular congestion, suggestive of pulmonary edema secondary to CHF. BNP elevated at 800. Creatinine worse at 3.1     started on lasix gtt at 20/hr. Hold home metolazone and will assess UOP response to see if should restart metolazone     Given his recurrent HF admissions and most recent hemodynamics, [facility] believes his overall prognosis is poor and recommended palliative care consult. Inpatient consult has been placed.     2 gram sodium restriction and 1500cc fluid restriction.  Encourage physical activity with graded exercise program.    -- Place central line for CVP monitoring  -- Continue lasix gtt to 15  -- Add acetazolamide  -- Nitroprusside gtt  -- Monitor lytes BID; replete as indicated  -- Will plan on central line to monitor CVP to guide diuresis    Paroxysmal A-fib  On coumadin.    -- Goal INR 2-3  -- Titrate warfarin daily      MALLIKA (obstructive sleep apnea)  See 'hypoventilation syndrome'    Pulmonary hypertension  WHO group 2-3 per his managing pulmonologist (Danyell at [facility])    Adherent with diet, CPAP, and on continuous O2 3L at home. Currently requiring 50L High flow 70% O2  In spite of latter, he continues to be polycythemic.     -- RV dysfunction treatment as above    Hypoventilation syndrome  BIPAP QHS once HFNC titrated        VTE Risk Mitigation (From admission, onward)           Ordered     warfarin (COUMADIN) tablet 5 mg  Daily         07/09/24 0808     warfarin tablet 7.5 mg  Once          07/09/24 0808     IP VTE HIGH RISK PATIENT  Once         07/04/24 2302     Place sequential compression device  Until discontinued         07/04/24 2302                    Vik White MD  Cardiology  [facility] - Cardiac Intensive [facility]

## 2024-07-10 LAB
ALBUMIN SERPL BCP-MCNC: 2.5 G/DL (ref 3.5–5.2)
ALLENS TEST: ABNORMAL
ALLENS TEST: ABNORMAL
ALLENS TEST: NORMAL
ALP SERPL-CCNC: 121 U/L (ref 55–135)
ALT SERPL W/O P-5'-P-CCNC: 8 U/L (ref 10–44)
ANION GAP SERPL CALC-SCNC: 7 MMOL/L (ref 8–16)
ANION GAP SERPL CALC-SCNC: 8 MMOL/L (ref 8–16)
AST SERPL-CCNC: 20 U/L (ref 10–40)
BASOPHILS # BLD AUTO: 0.03 K/UL (ref 0–0.2)
BASOPHILS NFR BLD: 0.5 % (ref 0–1.9)
BILIRUB SERPL-MCNC: 1.5 MG/DL (ref 0.1–1)
BUN SERPL-MCNC: 75 MG/DL (ref 6–20)
BUN SERPL-MCNC: 75 MG/DL (ref 6–20)
CALCIUM SERPL-MCNC: 9.3 MG/DL (ref 8.7–10.5)
CALCIUM SERPL-MCNC: 9.3 MG/DL (ref 8.7–10.5)
CHLORIDE SERPL-SCNC: 89 MMOL/L (ref 95–110)
CHLORIDE SERPL-SCNC: 89 MMOL/L (ref 95–110)
CO2 SERPL-SCNC: 41 MMOL/L (ref 23–29)
CO2 SERPL-SCNC: 43 MMOL/L (ref 23–29)
CREAT SERPL-MCNC: 1.7 MG/DL (ref 0.5–1.4)
CREAT SERPL-MCNC: 1.7 MG/DL (ref 0.5–1.4)
DELSYS: ABNORMAL
DIFFERENTIAL METHOD BLD: ABNORMAL
EOSINOPHIL # BLD AUTO: 0 K/UL (ref 0–0.5)
EOSINOPHIL NFR BLD: 0 % (ref 0–8)
ERYTHROCYTE [DISTWIDTH] IN BLOOD BY AUTOMATED COUNT: 21.5 % (ref 11.5–14.5)
ERYTHROCYTE [SEDIMENTATION RATE] IN BLOOD BY WESTERGREN METHOD: 45 MM/H
EST. GFR  (NO RACE VARIABLE): 49.1 ML/MIN/1.73 M^2
EST. GFR  (NO RACE VARIABLE): 49.1 ML/MIN/1.73 M^2
FIO2: 70
GLUCOSE SERPL-MCNC: 112 MG/DL (ref 70–110)
GLUCOSE SERPL-MCNC: 129 MG/DL (ref 70–110)
HCO3 UR-SCNC: 38.5 MMOL/L (ref 24–28)
HCO3 UR-SCNC: 45.6 MMOL/L (ref 24–28)
HCT VFR BLD AUTO: 47.9 % (ref 40–54)
HGB BLD-MCNC: 13.2 G/DL (ref 14–18)
IMM GRANULOCYTES # BLD AUTO: 0.02 K/UL (ref 0–0.04)
IMM GRANULOCYTES NFR BLD AUTO: 0.3 % (ref 0–0.5)
INR PPP: 1.4 (ref 0.8–1.2)
LYMPHOCYTES # BLD AUTO: 0.7 K/UL (ref 1–4.8)
LYMPHOCYTES NFR BLD: 10.5 % (ref 18–48)
MAGNESIUM SERPL-MCNC: 1.9 MG/DL (ref 1.6–2.6)
MCH RBC QN AUTO: 24 PG (ref 27–31)
MCHC RBC AUTO-ENTMCNC: 27.6 G/DL (ref 32–36)
MCV RBC AUTO: 87 FL (ref 82–98)
MODE: ABNORMAL
MONOCYTES # BLD AUTO: 0.5 K/UL (ref 0.3–1)
MONOCYTES NFR BLD: 7.9 % (ref 4–15)
NEUTROPHILS # BLD AUTO: 5.1 K/UL (ref 1.8–7.7)
NEUTROPHILS NFR BLD: 80.8 % (ref 38–73)
NRBC BLD-RTO: 0 /100 WBC
PCO2 BLDA: 77.3 MMHG (ref 35–45)
PCO2 BLDA: 92.8 MMHG (ref 35–45)
PH SMN: 7.3 [PH] (ref 7.35–7.45)
PH SMN: 7.31 [PH] (ref 7.35–7.45)
PHOSPHATE SERPL-MCNC: 4.2 MG/DL (ref 2.7–4.5)
PLATELET # BLD AUTO: 169 K/UL (ref 150–450)
PMV BLD AUTO: 9.8 FL (ref 9.2–12.9)
PO2 BLDA: 33 MMHG (ref 40–60)
PO2 BLDA: 47 MMHG (ref 40–60)
PO2 BLDA: 47 MMHG (ref 40–60)
POC BE: 12 MMOL/L
POC BE: 19 MMOL/L
POC SATURATED O2: 54 % (ref 95–100)
POC SATURATED O2: 73 % (ref 95–100)
POC SATURATED O2: 74 % (ref 95–100)
POC TCO2: 41 MMOL/L (ref 24–29)
POC TCO2: 48 MMOL/L (ref 24–29)
POTASSIUM SERPL-SCNC: 3.4 MMOL/L (ref 3.5–5.1)
POTASSIUM SERPL-SCNC: 4.2 MMOL/L (ref 3.5–5.1)
PROT SERPL-MCNC: 7.1 G/DL (ref 6–8.4)
PROTHROMBIN TIME: 15.3 SEC (ref 9–12.5)
RBC # BLD AUTO: 5.51 M/UL (ref 4.6–6.2)
SAMPLE: ABNORMAL
SAMPLE: ABNORMAL
SAMPLE: NORMAL
SITE: ABNORMAL
SITE: ABNORMAL
SITE: NORMAL
SODIUM SERPL-SCNC: 138 MMOL/L (ref 136–145)
SODIUM SERPL-SCNC: 139 MMOL/L (ref 136–145)
WBC # BLD AUTO: 6.29 K/UL (ref 3.9–12.7)

## 2024-07-10 PROCEDURE — 63600175 PHARM REV CODE 636 W HCPCS: Performed by: STUDENT IN AN ORGANIZED HEALTH CARE EDUCATION/TRAINING PROGRAM

## 2024-07-10 PROCEDURE — 94640 AIRWAY INHALATION TREATMENT: CPT

## 2024-07-10 PROCEDURE — 99232 SBSQ HOSP IP/OBS MODERATE 35: CPT | Mod: GC,,, | Performed by: INTERNAL MEDICINE

## 2024-07-10 PROCEDURE — 27100171 HC OXYGEN HIGH FLOW UP TO 24 HOURS

## 2024-07-10 PROCEDURE — 83735 ASSAY OF MAGNESIUM: CPT

## 2024-07-10 PROCEDURE — 84100 ASSAY OF PHOSPHORUS: CPT

## 2024-07-10 PROCEDURE — 80053 COMPREHEN METABOLIC PANEL: CPT

## 2024-07-10 PROCEDURE — 94660 CPAP INITIATION&MGMT: CPT

## 2024-07-10 PROCEDURE — 99900035 HC TECH TIME PER 15 MIN (STAT)

## 2024-07-10 PROCEDURE — 80048 BASIC METABOLIC PNL TOTAL CA: CPT | Mod: XB

## 2024-07-10 PROCEDURE — 85025 COMPLETE CBC W/AUTO DIFF WBC: CPT

## 2024-07-10 PROCEDURE — 25000003 PHARM REV CODE 250

## 2024-07-10 PROCEDURE — 63600175 PHARM REV CODE 636 W HCPCS

## 2024-07-10 PROCEDURE — 94761 N-INVAS EAR/PLS OXIMETRY MLT: CPT | Mod: XB

## 2024-07-10 PROCEDURE — 25000003 PHARM REV CODE 250: Performed by: STUDENT IN AN ORGANIZED HEALTH CARE EDUCATION/TRAINING PROGRAM

## 2024-07-10 PROCEDURE — 82803 BLOOD GASES ANY COMBINATION: CPT

## 2024-07-10 PROCEDURE — 85610 PROTHROMBIN TIME: CPT

## 2024-07-10 PROCEDURE — 20000000 HC ICU ROOM

## 2024-07-10 RX ORDER — POTASSIUM CHLORIDE 29.8 MG/ML
40 INJECTION INTRAVENOUS EVERY 4 HOURS
Status: COMPLETED | OUTPATIENT
Start: 2024-07-10 | End: 2024-07-10

## 2024-07-10 RX ORDER — WARFARIN SODIUM 5 MG/1
5 TABLET ORAL DAILY
Status: DISCONTINUED | OUTPATIENT
Start: 2024-07-11 | End: 2024-07-11

## 2024-07-10 RX ORDER — FUROSEMIDE 10 MG/ML
80 INJECTION INTRAMUSCULAR; INTRAVENOUS ONCE
Status: COMPLETED | OUTPATIENT
Start: 2024-07-10 | End: 2024-07-10

## 2024-07-10 RX ORDER — MAGNESIUM SULFATE HEPTAHYDRATE 40 MG/ML
2 INJECTION, SOLUTION INTRAVENOUS ONCE
Status: COMPLETED | OUTPATIENT
Start: 2024-07-10 | End: 2024-07-10

## 2024-07-10 RX ADMIN — POTASSIUM CHLORIDE 40 MEQ: 29.8 INJECTION, SOLUTION INTRAVENOUS at 05:07

## 2024-07-10 RX ADMIN — FUROSEMIDE 15 MG/HR: 10 INJECTION, SOLUTION INTRAMUSCULAR; INTRAVENOUS at 12:07

## 2024-07-10 RX ADMIN — FUROSEMIDE 80 MG: 10 INJECTION, SOLUTION INTRAVENOUS at 05:07

## 2024-07-10 RX ADMIN — WARFARIN SODIUM 7.5 MG: 2.5 TABLET ORAL at 05:07

## 2024-07-10 RX ADMIN — SODIUM NITROPRUSSIDE 0.7 MCG/KG/MIN: 50 INJECTION INTRAVENOUS at 12:07

## 2024-07-10 RX ADMIN — MAGNESIUM SULFATE HEPTAHYDRATE 2 G: 40 INJECTION, SOLUTION INTRAVENOUS at 04:07

## 2024-07-10 RX ADMIN — PANTOPRAZOLE SODIUM 40 MG: 40 TABLET, DELAYED RELEASE ORAL at 08:07

## 2024-07-10 RX ADMIN — ALLOPURINOL 300 MG: 100 TABLET ORAL at 08:07

## 2024-07-10 RX ADMIN — FLUTICASONE PROPIONATE AND SALMETEROL 1 PUFF: 50; 250 POWDER RESPIRATORY (INHALATION) at 08:07

## 2024-07-10 RX ADMIN — POTASSIUM CHLORIDE 40 MEQ: 29.8 INJECTION, SOLUTION INTRAVENOUS at 10:07

## 2024-07-10 RX ADMIN — TIOTROPIUM BROMIDE INHALATION SPRAY 2 PUFF: 3.12 SPRAY, METERED RESPIRATORY (INHALATION) at 08:07

## 2024-07-10 RX ADMIN — SODIUM NITROPRUSSIDE 0.3 MCG/KG/MIN: 50 INJECTION INTRAVENOUS at 11:07

## 2024-07-10 RX ADMIN — DEXTROSE 500 MG: 50 INJECTION, SOLUTION INTRAVENOUS at 08:07

## 2024-07-10 NOTE — PROGRESS NOTES
Epic chat message for pt to be educated on a low salt diet     Food & Nutrition  Education    Diet Education:Low sodium. Fluid restriction   Time Spent: 7 minutes   Learners: Pt       Nutrition Education provided with handouts:   Reviewed sodium restriction and foods high in sodium. Reviewed fluid restriction and foods considered fluids. Encouraged pt to monitor weights daily. Pt verbalized understanding.     Comments: Pt on Bipap during visit. Discussed low sodium/ nutrition facts label and the importance of regulating salt and how salt and fluid go hand in hand. Pt shook his head in a yes motion on bipap. Handouts were provided on counter near pt.     All questions and concerns answered. Dietitian's contact information provided.     Follow-Up: Yes     Please Re-consult as needed    Thanks!

## 2024-07-10 NOTE — ASSESSMENT & PLAN NOTE
WHO group 2-3 per his managing pulmonologist (Danyell at [facility])    Adherent with diet, CPAP, and on continuous O2 3L at home. Currently requiring 50L High flow 70% O2  In spite of latter, he continues to be polycythemic.     -- RV dysfunction treatment as above

## 2024-07-10 NOTE — ASSESSMENT & PLAN NOTE
NYHA class III symptoms with recurrent admissions.  He was seen by [facility] outpatient 6/2024 who state patient was feeling better on new diuretic regimen however worried about the frequency of use of metolazone and his worsening kidney function.       CXR with cardiomegaly and pulmonary vascular congestion, suggestive of pulmonary edema secondary to CHF. BNP elevated at 800. Creatinine worse at 3.1     started on lasix gtt at 20/hr. Hold home metolazone and will assess UOP response to see if should restart metolazone     Given his recurrent HF admissions and most recent hemodynamics, [facility] believes his overall prognosis is poor and recommended palliative care consult. Inpatient consult has been placed.     2 gram sodium restriction and 1500cc fluid restriction.  Encourage physical activity with graded exercise program.    Hemodynamics 7/10 AM:  CVP 18, SvO2 74, CO 8.3, CI 3.8,     -- Place central line for CVP monitoring  -- Continue lasix gtt at 15  -- Add acetazolamide  -- Nitroprusside gtt; monitor thiocyanide levels  -- Monitor lytes BID; replete as indicated

## 2024-07-10 NOTE — PROGRESS NOTES
[facility] - Cardiac Intensive Care  Cardiology  Progress Note    Patient Name: Yong Mcintyre  MRN: [id number]  Admission Date: 7/4/2024  Hospital Length of Stay: 6 days  Code Status: Full Code   Attending Physician: Fermin Davis MD   Primary Care Physician: Gianni Escalona MD  Expected Discharge Date: 7/12/2024  Principal Problem:Right ventricular dysfunction    Subjective:     Hospital Course:   The patient was admitted to the CCU for further management of right-sided heart failure. He was diuresed with a lasix gtt. Electrolytes were monitored and repleted as indicated. Palliative care was consulted given his poor prognosis. The patient had significant O2 requirements on admission which were slow to wean. A central line was placed for close CVP monitoring in the setting of aggressive diuresis. Nutrition consult placed for low salt diet education.    Interval History: No acute overnight events. Will continue IV diuresis w/ lasix gtt and diamox. Will continue nipride, monitor thiocyanide levels in AM. Net negative 2.1L overnight.    Review of Systems   Constitutional: Positive for weight gain. Negative for fever.   HENT:  Negative for congestion.    Cardiovascular:  Negative for leg swelling, near-syncope, palpitations and syncope.   Respiratory:  Positive for shortness of breath.    Gastrointestinal:  Negative for abdominal pain and heartburn.   Genitourinary:  Negative for dysuria and hematuria.   Psychiatric/Behavioral:  The patient is not nervous/anxious.    All other systems reviewed and are negative.    Objective:     Vital Signs (Most Recent):  Temp: 97.4 °F (36.3 °C) (07/10/24 0301)  Pulse: 110 (07/10/24 1015)  Resp: 20 (07/10/24 1015)  BP: 113/70 (07/10/24 1015)  SpO2: (!) 92 % (07/10/24 1015) Vital Signs (24h Range):  Temp:  [97.4 °F (36.3 °C)-98.3 °F (36.8 °C)] 97.4 °F (36.3 °C)  Pulse:  [103-118] 110  Resp:  [12-43] 20  SpO2:  [89 %-100 %] 92 %  BP: ()/(50-99) 113/70     Weight: 104.5 kg (230  lb 6.1 oz)  Body mass index is 38.34 kg/m².     SpO2: (!) 92 %         Intake/Output Summary (Last 24 hours) at 7/10/2024 1141  Last data filed at 7/10/2024 1000  Gross per 24 hour   Intake 827.1 ml   Output 2000 ml   Net -1172.9 ml       Lines/Drains/Airways       Central Venous Catheter Line  Duration             Percutaneous Central Line - Triple Lumen  07/08/24 1145 Internal Jugular Right 1 day              Peripheral Intravenous Line  Duration                  Peripheral IV - Single Lumen 07/07/24 2007 20 G Left;Posterior Hand 2 days         Peripheral IV - Single Lumen 07/07/24 2018 20 G Posterior;Right Wrist 2 days                       Physical Exam  Vitals and nursing note reviewed.   Constitutional:       Appearance: Normal appearance.   HENT:      Right Ear: External ear normal.      Left Ear: External ear normal.      Mouth/Throat:      Mouth: Mucous membranes are moist.      Pharynx: No posterior oropharyngeal erythema.   Eyes:      Extraocular Movements: Extraocular movements intact.      Conjunctiva/sclera: Conjunctivae normal.   Cardiovascular:      Rate and Rhythm: Regular rhythm. Tachycardia present.   Pulmonary:      Effort: No respiratory distress.      Breath sounds: Rhonchi present. No wheezing.   Abdominal:      General: There is distension.   Musculoskeletal:      Right lower leg: Edema (2+) present.      Left lower leg: Edema (2+) present.   Skin:     General: Skin is warm.      Findings: No rash.   Neurological:      Mental Status: He is alert and oriented to person, place, and time. Mental status is at baseline.       Significant Labs: CMP   Recent Labs   Lab 07/09/24  0116 07/09/24  1228 07/10/24  0240    139 138   K 3.5 3.7 3.4*   CL 89* 89* 89*   CO2 43* 43* 41*   * 103 129*   BUN 75* 71* 75*   CREATININE 1.7* 1.7* 1.7*   CALCIUM 9.1 9.0 9.3   PROT 7.3  --  7.1   ALBUMIN 2.5*  --  2.5*   BILITOT 1.7*  --  1.5*   ALKPHOS 101  --  121   AST 22  --  20   ALT 9*  --  8*    ANIONGAP 6* 7* 8   , CBC   Recent Labs   Lab 07/09/24  0116 07/10/24  0240   WBC 6.31 6.29   HGB 14.1 13.2*   HCT 51.5 47.9    169   , and All pertinent lab results from the last 24 hours have been reviewed.    Assessment and Plan:     * Right ventricular dysfunction  NYHA class III symptoms with recurrent admissions.  He was seen by [facility] outpatient 6/2024 who state patient was feeling better on new diuretic regimen however worried about the frequency of use of metolazone and his worsening kidney function.       CXR with cardiomegaly and pulmonary vascular congestion, suggestive of pulmonary edema secondary to CHF. BNP elevated at 800. Creatinine worse at 3.1     started on lasix gtt at 20/hr. Hold home metolazone and will assess UOP response to see if should restart metolazone     Given his recurrent HF admissions and most recent hemodynamics, [facility] believes his overall prognosis is poor and recommended palliative care consult. Inpatient consult has been placed.     2 gram sodium restriction and 1500cc fluid restriction.  Encourage physical activity with graded exercise program.    Hemodynamics 7/10 AM:  CVP 18, SvO2 74, CO 8.3, CI 3.8,     -- Place central line for CVP monitoring  -- Continue lasix gtt at 15  -- Add acetazolamide  -- Nitroprusside gtt; monitor thiocyanide levels  -- Monitor lytes BID; replete as indicated    Paroxysmal A-fib  On coumadin.    -- Goal INR 2-3  -- Titrate warfarin daily      MALLIKA (obstructive sleep apnea)  See 'hypoventilation syndrome'    Pulmonary hypertension  WHO group 2-3 per his managing pulmonologist (Danyell at [facility])    Adherent with diet, CPAP, and on continuous O2 3L at home. Currently requiring 50L High flow 70% O2  In spite of latter, he continues to be polycythemic.     -- RV dysfunction treatment as above    Hypoventilation syndrome  BIPAP QHS once HFNC titrated        VTE Risk Mitigation (From admission, onward)           Ordered     warfarin (COUMADIN)  tablet 5 mg  Daily         07/10/24 0742     warfarin tablet 7.5 mg  Once         07/10/24 0742     IP VTE HIGH RISK PATIENT  Once         07/04/24 2302     Place sequential compression device  Until discontinued         07/04/24 2302                    Vik White MD  Cardiology  [facility] - Cardiac Int[facility]

## 2024-07-10 NOTE — PLAN OF CARE
CICU DAILY GOALS     CVP: 15, 16, 17  Svo2: 63, 73      A: Awake    RASS: Goal - RASS Goal: 0-->alert and calm  Actual - RASS (Carter Agitation-Sedation Scale): drowsy (MD Jose aware of pt being drowsy.)   Restraint necessity:    B: Breath   SBT: Not intubated   C: Coordinate A & B, analgesics/sedatives   Pain: managed    SAT: Not intubated  D: Delirium   CAM-ICU: Overall CAM-ICU: Negative  E: Early(intubated/ Progressive (non-intubated) Mobility      Activity: Activity Management: Rolling - L1  FAS: Feeding/Nutrition   Diet order: Diet/Nutrition Received: low saturated fat/low cholesterol,   Fluid restriction: Fluid Requirement: 1.5 L FR    T: Thrombus   DVT prophylaxis: VTE Required Core Measure: Pharmacological prophylaxis initiated/maintained  H: HOB Elevation   Head of Bed (HOB) Positioning: HOB at 30-45 degrees  U: Ulcer Prophylaxis   GI: yes  G: Glucose control   managed      S: Skin   Nurses Note -- 4 Eyes  Skin assessed during: Q Shift Change   CHOOSE ONE:  [] No Altered Skin Integrity Present      []Prevention Measures Documented    [x] Yes- Altered Skin Integrity Present or Discovered     [] LDA Added if Not in Epic (Describe Wound)     [] New Altered Skin Integrity was Present on Admit and Documented in LDA     [] Wound Image Taken  Wound Care Consulted? Yes  Attending Nurse:  Corina Natarajan RN/Staff Member:  Yes    B: Bowel Function   no issues   I: Indwelling Catheters   Dunne necessity:     CVC necessity: Yes   IPAD offered: No    Plan for the day     Family/Goals of care/Code Status   Code Status: Full Code     No acute events throughout day, VS and assessment per flow sheet, patient progressing towards goals as tolerated, plan of care reviewed with Yong Mcintyre and family, all concerns addressed, will continue to monitor.   Problem: Wound  Goal: Optimal Coping  Outcome: Progressing     Problem: Wound  Goal: Optimal Pain Control and Function  Outcome: Progressing

## 2024-07-10 NOTE — SUBJECTIVE & OBJECTIVE
Interval History: No acute overnight events. Will continue IV diuresis w/ lasix gtt and diamox. Will continue nipride, monitor thiocyanide levels in AM. Net negative 2.1L overnight.    Review of Systems   Constitutional: Positive for weight gain. Negative for fever.   HENT:  Negative for congestion.    Cardiovascular:  Negative for leg swelling, near-syncope, palpitations and syncope.   Respiratory:  Positive for shortness of breath.    Gastrointestinal:  Negative for abdominal pain and heartburn.   Genitourinary:  Negative for dysuria and hematuria.   Psychiatric/Behavioral:  The patient is not nervous/anxious.    All other systems reviewed and are negative.    Objective:     Vital Signs (Most Recent):  Temp: 97.4 °F (36.3 °C) (07/10/24 0301)  Pulse: 110 (07/10/24 1015)  Resp: 20 (07/10/24 1015)  BP: 113/70 (07/10/24 1015)  SpO2: (!) 92 % (07/10/24 1015) Vital Signs (24h Range):  Temp:  [97.4 °F (36.3 °C)-98.3 °F (36.8 °C)] 97.4 °F (36.3 °C)  Pulse:  [103-118] 110  Resp:  [12-43] 20  SpO2:  [89 %-100 %] 92 %  BP: ()/(50-99) 113/70     Weight: 104.5 kg (230 lb 6.1 oz)  Body mass index is 38.34 kg/m².     SpO2: (!) 92 %         Intake/Output Summary (Last 24 hours) at 7/10/2024 1141  Last data filed at 7/10/2024 1000  Gross per 24 hour   Intake 827.1 ml   Output 2000 ml   Net -1172.9 ml       Lines/Drains/Airways       Central Venous Catheter Line  Duration             Percutaneous Central Line - Triple Lumen  07/08/24 1145 Internal Jugular Right 1 day              Peripheral Intravenous Line  Duration                  Peripheral IV - Single Lumen 07/07/24 2007 20 G Left;Posterior Hand 2 days         Peripheral IV - Single Lumen 07/07/24 2018 20 G Posterior;Right Wrist 2 days                       Physical Exam  Vitals and nursing note reviewed.   Constitutional:       Appearance: Normal appearance.   HENT:      Right Ear: External ear normal.      Left Ear: External ear normal.      Mouth/Throat:      Mouth:  Mucous membranes are moist.      Pharynx: No posterior oropharyngeal erythema.   Eyes:      Extraocular Movements: Extraocular movements intact.      Conjunctiva/sclera: Conjunctivae normal.   Cardiovascular:      Rate and Rhythm: Regular rhythm. Tachycardia present.   Pulmonary:      Effort: No respiratory distress.      Breath sounds: Rhonchi present. No wheezing.   Abdominal:      General: There is distension.   Musculoskeletal:      Right lower leg: Edema (2+) present.      Left lower leg: Edema (2+) present.   Skin:     General: Skin is warm.      Findings: No rash.   Neurological:      Mental Status: He is alert and oriented to person, place, and time. Mental status is at baseline.       Significant Labs: CMP   Recent Labs   Lab 07/09/24  0116 07/09/24  1228 07/10/24  0240    139 138   K 3.5 3.7 3.4*   CL 89* 89* 89*   CO2 43* 43* 41*   * 103 129*   BUN 75* 71* 75*   CREATININE 1.7* 1.7* 1.7*   CALCIUM 9.1 9.0 9.3   PROT 7.3  --  7.1   ALBUMIN 2.5*  --  2.5*   BILITOT 1.7*  --  1.5*   ALKPHOS 101  --  121   AST 22  --  20   ALT 9*  --  8*   ANIONGAP 6* 7* 8   , CBC   Recent Labs   Lab 07/09/24  0116 07/10/24  0240   WBC 6.31 6.29   HGB 14.1 13.2*   HCT 51.5 47.9    169   , and All pertinent lab results from the last 24 hours have been reviewed.

## 2024-07-11 LAB
ALBUMIN SERPL BCP-MCNC: 2.6 G/DL (ref 3.5–5.2)
ALLENS TEST: ABNORMAL
ALLENS TEST: ABNORMAL
ALP SERPL-CCNC: 123 U/L (ref 55–135)
ALT SERPL W/O P-5'-P-CCNC: 11 U/L (ref 10–44)
ANION GAP SERPL CALC-SCNC: 5 MMOL/L (ref 8–16)
ANION GAP SERPL CALC-SCNC: 8 MMOL/L (ref 8–16)
AST SERPL-CCNC: 21 U/L (ref 10–40)
BASOPHILS # BLD AUTO: 0.05 K/UL (ref 0–0.2)
BASOPHILS NFR BLD: 0.9 % (ref 0–1.9)
BILIRUB SERPL-MCNC: 1.1 MG/DL (ref 0.1–1)
BUN SERPL-MCNC: 77 MG/DL (ref 6–20)
BUN SERPL-MCNC: 78 MG/DL (ref 6–20)
CALCIUM SERPL-MCNC: 9.3 MG/DL (ref 8.7–10.5)
CALCIUM SERPL-MCNC: 9.5 MG/DL (ref 8.7–10.5)
CHLORIDE SERPL-SCNC: 90 MMOL/L (ref 95–110)
CHLORIDE SERPL-SCNC: 92 MMOL/L (ref 95–110)
CO2 SERPL-SCNC: 39 MMOL/L (ref 23–29)
CO2 SERPL-SCNC: 40 MMOL/L (ref 23–29)
CREAT SERPL-MCNC: 2 MG/DL (ref 0.5–1.4)
CREAT SERPL-MCNC: 2 MG/DL (ref 0.5–1.4)
DELSYS: ABNORMAL
DELSYS: ABNORMAL
DIFFERENTIAL METHOD BLD: ABNORMAL
EOSINOPHIL # BLD AUTO: 0 K/UL (ref 0–0.5)
EOSINOPHIL NFR BLD: 0 % (ref 0–8)
ERYTHROCYTE [DISTWIDTH] IN BLOOD BY AUTOMATED COUNT: 21.6 % (ref 11.5–14.5)
ERYTHROCYTE [SEDIMENTATION RATE] IN BLOOD BY WESTERGREN METHOD: 14 MM/H
EST. GFR  (NO RACE VARIABLE): 40.4 ML/MIN/1.73 M^2
EST. GFR  (NO RACE VARIABLE): 40.4 ML/MIN/1.73 M^2
FIO2: 70
FIO2: 70
FLOW: 45
FLOW: 45
GLUCOSE SERPL-MCNC: 110 MG/DL (ref 70–110)
GLUCOSE SERPL-MCNC: 130 MG/DL (ref 70–110)
HCT VFR BLD AUTO: 48.8 % (ref 40–54)
HGB BLD-MCNC: 13.1 G/DL (ref 14–18)
IMM GRANULOCYTES # BLD AUTO: 0.01 K/UL (ref 0–0.04)
IMM GRANULOCYTES NFR BLD AUTO: 0.2 % (ref 0–0.5)
INR PPP: 1.5 (ref 0.8–1.2)
LYMPHOCYTES # BLD AUTO: 0.7 K/UL (ref 1–4.8)
LYMPHOCYTES NFR BLD: 12.2 % (ref 18–48)
MAGNESIUM SERPL-MCNC: 2.1 MG/DL (ref 1.6–2.6)
MCH RBC QN AUTO: 24.2 PG (ref 27–31)
MCHC RBC AUTO-ENTMCNC: 26.8 G/DL (ref 32–36)
MCV RBC AUTO: 90 FL (ref 82–98)
MODE: ABNORMAL
MODE: ABNORMAL
MONOCYTES # BLD AUTO: 0.5 K/UL (ref 0.3–1)
MONOCYTES NFR BLD: 8.8 % (ref 4–15)
NEUTROPHILS # BLD AUTO: 4.4 K/UL (ref 1.8–7.7)
NEUTROPHILS NFR BLD: 77.9 % (ref 38–73)
NRBC BLD-RTO: 0 /100 WBC
PHOSPHATE SERPL-MCNC: 4.6 MG/DL (ref 2.7–4.5)
PLATELET # BLD AUTO: 166 K/UL (ref 150–450)
PMV BLD AUTO: 10.3 FL (ref 9.2–12.9)
PO2 BLDA: 34 MMHG (ref 40–60)
PO2 BLDA: 37 MMHG (ref 40–60)
POC SATURATED O2: 56 % (ref 95–100)
POC SATURATED O2: 62 % (ref 95–100)
POTASSIUM SERPL-SCNC: 3.7 MMOL/L (ref 3.5–5.1)
POTASSIUM SERPL-SCNC: 4 MMOL/L (ref 3.5–5.1)
PROT SERPL-MCNC: 7.4 G/DL (ref 6–8.4)
PROTHROMBIN TIME: 16.2 SEC (ref 9–12.5)
RBC # BLD AUTO: 5.41 M/UL (ref 4.6–6.2)
SAMPLE: ABNORMAL
SAMPLE: ABNORMAL
SITE: ABNORMAL
SITE: ABNORMAL
SODIUM SERPL-SCNC: 136 MMOL/L (ref 136–145)
SODIUM SERPL-SCNC: 138 MMOL/L (ref 136–145)
SP02: 89
WBC # BLD AUTO: 5.58 K/UL (ref 3.9–12.7)

## 2024-07-11 PROCEDURE — 51798 US URINE CAPACITY MEASURE: CPT

## 2024-07-11 PROCEDURE — 63600175 PHARM REV CODE 636 W HCPCS: Performed by: STUDENT IN AN ORGANIZED HEALTH CARE EDUCATION/TRAINING PROGRAM

## 2024-07-11 PROCEDURE — 94640 AIRWAY INHALATION TREATMENT: CPT

## 2024-07-11 PROCEDURE — 80048 BASIC METABOLIC PNL TOTAL CA: CPT | Mod: XB

## 2024-07-11 PROCEDURE — 85610 PROTHROMBIN TIME: CPT

## 2024-07-11 PROCEDURE — 25000003 PHARM REV CODE 250: Performed by: INTERNAL MEDICINE

## 2024-07-11 PROCEDURE — 27100171 HC OXYGEN HIGH FLOW UP TO 24 HOURS

## 2024-07-11 PROCEDURE — 25000003 PHARM REV CODE 250

## 2024-07-11 PROCEDURE — 94799 UNLISTED PULMONARY SVC/PX: CPT

## 2024-07-11 PROCEDURE — 99900035 HC TECH TIME PER 15 MIN (STAT)

## 2024-07-11 PROCEDURE — 84430 ASSAY OF THIOCYANATE: CPT

## 2024-07-11 PROCEDURE — 94660 CPAP INITIATION&MGMT: CPT

## 2024-07-11 PROCEDURE — 63600175 PHARM REV CODE 636 W HCPCS

## 2024-07-11 PROCEDURE — 99232 SBSQ HOSP IP/OBS MODERATE 35: CPT | Mod: GC,,, | Performed by: INTERNAL MEDICINE

## 2024-07-11 PROCEDURE — 80053 COMPREHEN METABOLIC PANEL: CPT

## 2024-07-11 PROCEDURE — 25000003 PHARM REV CODE 250: Performed by: STUDENT IN AN ORGANIZED HEALTH CARE EDUCATION/TRAINING PROGRAM

## 2024-07-11 PROCEDURE — 84100 ASSAY OF PHOSPHORUS: CPT

## 2024-07-11 PROCEDURE — 85025 COMPLETE CBC W/AUTO DIFF WBC: CPT

## 2024-07-11 PROCEDURE — 94761 N-INVAS EAR/PLS OXIMETRY MLT: CPT | Mod: XB

## 2024-07-11 PROCEDURE — 82600 ASSAY OF CYANIDE: CPT

## 2024-07-11 PROCEDURE — 82803 BLOOD GASES ANY COMBINATION: CPT

## 2024-07-11 PROCEDURE — 20000000 HC ICU ROOM

## 2024-07-11 PROCEDURE — 83735 ASSAY OF MAGNESIUM: CPT

## 2024-07-11 PROCEDURE — 99233 SBSQ HOSP IP/OBS HIGH 50: CPT | Mod: 95,,, | Performed by: CLINICAL NURSE SPECIALIST

## 2024-07-11 RX ORDER — SODIUM CHLORIDE 9 MG/ML
INJECTION, SOLUTION INTRAVENOUS
Status: DISCONTINUED | OUTPATIENT
Start: 2024-07-11 | End: 2024-07-29 | Stop reason: HOSPADM

## 2024-07-11 RX ORDER — WARFARIN SODIUM 5 MG/1
5 TABLET ORAL DAILY
Status: DISCONTINUED | OUTPATIENT
Start: 2024-07-12 | End: 2024-07-12

## 2024-07-11 RX ORDER — POTASSIUM CHLORIDE 29.8 MG/ML
40 INJECTION INTRAVENOUS ONCE
Status: COMPLETED | OUTPATIENT
Start: 2024-07-11 | End: 2024-07-11

## 2024-07-11 RX ORDER — HYDRALAZINE HYDROCHLORIDE 10 MG/1
10 TABLET, FILM COATED ORAL 3 TIMES DAILY
Status: DISCONTINUED | OUTPATIENT
Start: 2024-07-11 | End: 2024-07-12

## 2024-07-11 RX ORDER — WARFARIN SODIUM 5 MG/1
10 TABLET ORAL DAILY
Status: COMPLETED | OUTPATIENT
Start: 2024-07-11 | End: 2024-07-11

## 2024-07-11 RX ORDER — ISOSORBIDE DINITRATE 10 MG/1
10 TABLET ORAL 3 TIMES DAILY
Status: DISCONTINUED | OUTPATIENT
Start: 2024-07-11 | End: 2024-07-12

## 2024-07-11 RX ADMIN — FUROSEMIDE 30 MG/HR: 10 INJECTION, SOLUTION INTRAMUSCULAR; INTRAVENOUS at 08:07

## 2024-07-11 RX ADMIN — DEXTROSE 500 MG: 50 INJECTION, SOLUTION INTRAVENOUS at 09:07

## 2024-07-11 RX ADMIN — SODIUM CHLORIDE: 9 INJECTION, SOLUTION INTRAVENOUS at 09:07

## 2024-07-11 RX ADMIN — FLUTICASONE PROPIONATE AND SALMETEROL 1 PUFF: 50; 250 POWDER RESPIRATORY (INHALATION) at 10:07

## 2024-07-11 RX ADMIN — ISOSORBIDE DINITRATE 10 MG: 10 TABLET ORAL at 09:07

## 2024-07-11 RX ADMIN — TIOTROPIUM BROMIDE INHALATION SPRAY 2 PUFF: 3.12 SPRAY, METERED RESPIRATORY (INHALATION) at 08:07

## 2024-07-11 RX ADMIN — HYDRALAZINE HYDROCHLORIDE 10 MG: 10 TABLET, FILM COATED ORAL at 03:07

## 2024-07-11 RX ADMIN — FUROSEMIDE 30 MG/HR: 10 INJECTION, SOLUTION INTRAMUSCULAR; INTRAVENOUS at 11:07

## 2024-07-11 RX ADMIN — ISOSORBIDE DINITRATE 10 MG: 10 TABLET ORAL at 03:07

## 2024-07-11 RX ADMIN — WARFARIN SODIUM 10 MG: 5 TABLET ORAL at 06:07

## 2024-07-11 RX ADMIN — DEXTROSE 500 MG: 50 INJECTION, SOLUTION INTRAVENOUS at 11:07

## 2024-07-11 RX ADMIN — SODIUM CHLORIDE: 9 INJECTION, SOLUTION INTRAVENOUS at 08:07

## 2024-07-11 RX ADMIN — HYDRALAZINE HYDROCHLORIDE 10 MG: 10 TABLET, FILM COATED ORAL at 09:07

## 2024-07-11 RX ADMIN — FUROSEMIDE 30 MG/HR: 10 INJECTION, SOLUTION INTRAMUSCULAR; INTRAVENOUS at 02:07

## 2024-07-11 RX ADMIN — FLUTICASONE PROPIONATE AND SALMETEROL 1 PUFF: 50; 250 POWDER RESPIRATORY (INHALATION) at 08:07

## 2024-07-11 RX ADMIN — ALLOPURINOL 300 MG: 100 TABLET ORAL at 09:07

## 2024-07-11 RX ADMIN — PANTOPRAZOLE SODIUM 40 MG: 40 TABLET, DELAYED RELEASE ORAL at 09:07

## 2024-07-11 RX ADMIN — POTASSIUM CHLORIDE 40 MEQ: 29.8 INJECTION, SOLUTION INTRAVENOUS at 08:07

## 2024-07-11 NOTE — ASSESSMENT & PLAN NOTE
Acute on chronic.  NYHA class III symptoms with recurrent admissions.  He was seen by [facility] outpatient 6/2024 who state patient was feeling better on new diuretic regimen however worried about the frequency of use of metolazone and his worsening kidney function.       CXR with cardiomegaly and pulmonary vascular congestion, suggestive of pulmonary edema secondary to CHF. BNP elevated at 800. Creatinine worse at 3.1 on admission.     Given his recurrent HF admissions and most recent hemodynamics, [facility] believes his overall prognosis is poor and recommended palliative care consult. Inpatient consult has been placed.     2 gram sodium restriction and 1500cc fluid restriction.  Encourage physical activity with graded exercise program.    Hemodynamics 7/11 AM:  CVP 17, SvO2 54, CO 5.3, CI 2.4,     -- Placed central line for CVP monitoring  -- Continue lasix gtt at 30  -- Add acetazolamide  -- Will stop Nitroprusside gtt  -- Initiated GDMT w/ low dose Bidil as tolerated  -- Monitor lytes BID; replete as indicated

## 2024-07-11 NOTE — SUBJECTIVE & OBJECTIVE
"Interval History: Pt has severe Pulmonary HTN    Past Medical History:   Diagnosis Date    Arthritis     CHF (congestive heart failure)     Diastolic dysfunction    Cor pulmonale 11/05/2012    Gallstones     GERD (gastroesophageal reflux disease)     Hypertension     Morbid obesity 11/05/2012    Obesity hypoventilation syndrome     On home oxygen therapy 03/07/2014    MALLIKA (obstructive sleep apnea)     BPAP 16/11 with 3 L at night (continuous O2).    Paroxysmal atrial fibrillation     PFO (patent foramen ovale) 11/05/2012    status post closure    Pickwickian syndrome 11/05/2012    Pulmonary hypertension        Past Surgical History:   Procedure Laterality Date    CHOLECYSTECTOMY      RIGHT HEART CATHETERIZATION Right 03/22/2022    Procedure: INSERTION, CATHETER, RIGHT HEART;  Surgeon: Tony Martínez MD;  Location: [facility] CATH LAB;  Service: Cardiology;  Laterality: Right;    RIGHT HEART CATHETERIZATION Right 01/31/2024    Procedure: INSERTION, CATHETER, RIGHT HEART;  Surgeon: Sheri Ritter MD;  Location: [facility] CATH LAB;  Service: Cardiology;  Laterality: Right;    UPPER GASTROINTESTINAL ENDOSCOPY      2-3 years ago       Review of patient's allergies indicates:   Allergen Reactions    Lipitor [atorvastatin] Other (See Comments)     "it makes my legs feel like jelly"  Other reaction(s): causes legs to hurt       Medications:  Continuous Infusions:   furosemide (Lasix) 500 mg in 50 mL infusion (conc: 10 mg/mL)  30 mg/hr Intravenous Continuous 3 mL/hr at 07/11/24 1005 30 mg/hr at 07/11/24 1005     Scheduled Meds:   allopurinoL  300 mg Oral Daily    fluticasone-salmeterol 250-50 mcg/dose  1 puff Inhalation BID    hydrALAZINE  10 mg Oral TID    isosorbide dinitrate  10 mg Oral TID    pantoprazole  40 mg Oral Daily    tiotropium bromide  2 puff Inhalation Daily    warfarin  10 mg Oral Daily    [START ON 7/12/2024] warfarin  5 mg Oral Daily     PRN Meds:  Current Facility-Administered Medications:     0.9% NaCl, , " Intravenous, PRN    0.9% NaCl, , Intravenous, PRN    acetaminophen, 650 mg, Oral, Q6H PRN    albuterol, 2 puff, Inhalation, Q4H PRN    melatonin, 6 mg, Oral, Nightly PRN    sodium chloride 0.9%, 10 mL, Intravenous, PRN    Family History       Problem Relation (Age of Onset)    Arthritis Mother, Maternal Grandmother, Maternal Grandfather    Asthma Mother, Sister, Maternal Grandmother    Breast cancer Paternal Grandmother    Diabetes Maternal Grandmother    Down syndrome Brother    Gout Brother    Hypertension Father, Maternal Grandmother, Maternal Grandfather, Paternal Grandfather          Tobacco Use    Smoking status: Never    Smokeless tobacco: Never   Substance and Sexual Activity    Alcohol use: Yes     Comment: holidays  rare    Drug use: No    Sexual activity: Not Currently     Partners: Female       Review of Systems   Respiratory:  Positive for shortness of breath.    Allergic/Immunologic: Negative for food allergies.     Objective:     Vital Signs (Most Recent):  Temp: 98.5 °F (36.9 °C) (07/11/24 0730)  Pulse: 108 (07/11/24 0905)  Resp: 19 (07/11/24 0905)  BP: (!) 93/54 (07/11/24 0905)  SpO2: (!) 90 % (07/11/24 0905) Vital Signs (24h Range):  Temp:  [97.8 °F (36.6 °C)-98.8 °F (37.1 °C)] 98.5 °F (36.9 °C)  Pulse:  [] 108  Resp:  [11-39] 19  SpO2:  [84 %-97 %] 90 %  BP: ()/(31-78) 93/54     Weight: 106 kg (233 lb 11 oz)  Body mass index is 38.89 kg/m².       Physical Exam  Constitutional:       Interventions: Nasal cannula in place.      Comments: Comfort flow on at 35 L 45 %   HENT:      Head: Normocephalic and atraumatic.   Cardiovascular:      Rate and Rhythm: Normal rate.   Pulmonary:      Effort: Pulmonary effort is normal. No respiratory distress.      Comments: Pt on comfort flow O@ per NC  Abdominal:      General: There is distension.   Skin:     General: Skin is warm and dry.   Neurological:      Mental Status: He is alert and oriented to person, place, and time.   Psychiatric:          Behavior: Behavior is cooperative.            Review of Symptoms      Symptom Assessment (ESAS 0-10 Scale)  Pain:  0  Dyspnea:  0  Anxiety:  0  Nausea:  0  Depression:  0  Anorexia:  0  Fatigue:  0  Insomnia:  0  Restlessness:  0  Agitation:  0         Performance Status:  70    Living Arrangements:  Lives alone    Psychosocial/Cultural:   See Palliative Psychosocial Note: No  Pt lives alone, no adult children. Pt's Dad lives in [facility] and pt's mother lives in [place]. Per pt, they are both aware of his medical issues they get along.   **Primary  to Follow**  Palliative Care  Consult: Yes        Advance Care Planning   Advance Care Planning       Significant Labs: All pertinent labs within the past 24 hours have been reviewed.  CBC:   Recent Labs   Lab 07/11/24  0359   WBC 5.58   HGB 13.1*   HCT 48.8   MCV 90        BMP:  Recent Labs   Lab 07/11/24 0359         K 3.7   CL 90*   CO2 40*   BUN 78*   CREATININE 2.0*   CALCIUM 9.5   MG 2.1     LFT:  Lab Results   Component Value Date    AST 21 07/11/2024    ALKPHOS 123 07/11/2024    BILITOT 1.1 (H) 07/11/2024     Albumin:   Albumin   Date Value Ref Range Status   07/11/2024 2.6 (L) 3.5 - 5.2 g/dL Final     Protein:   Total Protein   Date Value Ref Range Status   07/11/2024 7.4 6.0 - 8.4 g/dL Final     Lactic acid:   Lab Results   Component Value Date    LACTATE 1.1 07/05/2024    LACTATE 1.1 04/22/2024       Significant Imaging: I have reviewed all pertinent imaging results/findings within the past 24 hours.

## 2024-07-11 NOTE — MEDICAL/APP STUDENT
[facility] - Cardiac Intensive Care  Hospital Medicine  Progress Note    Patient Name: Yong Mcintyre  MRN: [id number]  Patient Class: IP- Inpatient   Admission Date: 7/4/2024  Length of Stay: 7 days  Attending Physician: Fermin Davis MD  Primary Care Provider: Gianni Escalona MD        Subjective:     Principal Problem:Right ventricular dysfunction        HPI:  Yong Mcintyre is a 48 y.o. male with severe pulmonary HTN (Group 2-3, on 3L home O2, diagnosed prior to 2015, follows with Dr. Child at [facility], MALLIKA, Obesity hypoventilation syndrome, HFpEF, Paroxysmal AFib (on Coumadin), BMI > 35, HTN who presents for worsening shortness of breath and lower extremity swelling over the past 2 weeks. This is his 5th admission this year.  He reports compliance with his medications. His diuretic regimen was adjusted to the following; Torsemide 60 mg twice daily and metolazone 2.5 on M/WF/Sunday. Clinically reports NYHA class III symptoms. He reports his dry weight is 223lbs and is currently weighing in at 245lbs. He uses 3L of O2 at home and is currently on high flow 40L at 70%.      ED course:   ED work up showed WBC 3.2 Hgb 15.4 Troponin 0.02   Creatinine 3.1 Lactate 1.64. CXR showed cardiomegaly and pulmonary edema.     Overview/Hospital Course:  The patient was admitted to the CCU for further management of right-sided heart failure. He was diuresed with a lasix gtt. Electrolytes were monitored and repleted as indicated. Palliative care was consulted given his poor prognosis. The patient had significant O2 requirements on admission which were slow to wean. A central line was placed for close CVP monitoring in the setting of aggressive diuresis. Nutrition consult placed for low salt diet education. Acetazolamide was added to his diuretic regimen due to persistent metabolic alkalosis.      Interval History:   The patient was seen and examine at rounds this morning. There was no acute event overnight.  Patient has central line in placed, clean, dry, intact. On oxygen support, no complains of headache, fever, nausea, dyspnea, chest pain or any other symptoms. Total output net negative -385cc. Plan to increase the lasix dose today. Palliative care consulted. Hemodynamics today CVP 16 SVO2 62 CO 6.2 CI 2.8 .    Review of Systems   Constitutional:  Positive for chills. Negative for diaphoresis, fatigue and fever.   HENT:  Negative for congestion, rhinorrhea, sneezing and sore throat.    Eyes:  Negative for pain, discharge and redness.   Respiratory:  Negative for cough, choking, chest tightness, shortness of breath and wheezing.    Cardiovascular:  Positive for leg swelling. Negative for chest pain and palpitations.   Gastrointestinal:  Positive for abdominal distention. Negative for abdominal pain, constipation, diarrhea and nausea.   Endocrine: Negative for polydipsia and polyphagia.   Genitourinary:  Negative for dysuria and hematuria.   Musculoskeletal:  Negative for arthralgias, back pain, joint swelling, myalgias, neck pain and neck stiffness.   Skin:  Negative for color change, pallor and rash.   Neurological:  Negative for dizziness, syncope, numbness and headaches.   Psychiatric/Behavioral:  Negative for behavioral problems, confusion and sleep disturbance. The patient is not nervous/anxious.      Objective:   @[facility]MDCOBJ@Weight: 106 kg (233 lb 11 oz)  Body mass index is 38.89 kg/m².    Intake/Output Summary (Last 24 hours) at 7/11/2024 1220  Last data filed at 7/11/2024 1030  Gross per 24 hour   Intake 1434.25 ml   Output 1993 ml   Net -558.75 ml     Physical Exam  Constitutional:       Appearance: He is obese. He is ill-appearing.   HENT:      Head: Normocephalic and atraumatic.      Nose: Nose normal. No congestion or rhinorrhea.      Mouth/Throat:      Mouth: Mucous membranes are moist.      Pharynx: Oropharynx is clear. No oropharyngeal exudate or posterior oropharyngeal erythema.   Neck:       Vascular: No carotid bruit.   Cardiovascular:      Rate and Rhythm: Regular rhythm. Tachycardia present.      Pulses: Normal pulses.      Heart sounds: Normal heart sounds. No murmur heard.  Pulmonary:      Effort: Pulmonary effort is normal. No respiratory distress.      Breath sounds: Normal breath sounds. No wheezing or rales.   Abdominal:      General: Bowel sounds are normal. There is no distension.      Tenderness: There is no abdominal tenderness. There is no guarding.   Musculoskeletal:         General: Swelling present. No tenderness or deformity. Normal range of motion.      Cervical back: Normal range of motion. No rigidity.      Right lower leg: Edema present.      Left lower leg: Edema present.   Lymphadenopathy:      Cervical: No cervical adenopathy.   Skin:     General: Skin is warm.      Capillary Refill: Capillary refill takes less than 2 seconds.      Coloration: Skin is not jaundiced.      Findings: No bruising or lesion.   Neurological:      General: No focal deficit present.      Mental Status: He is alert and oriented to person, place, and time. Mental status is at baseline.   Psychiatric:         Mood and Affect: Mood normal.         Thought Content: Thought content normal.         Judgment: Judgment normal.         Significant Labs: All pertinent labs within the past 24 hours have been reviewed.  CBC:   Recent Labs   Lab 07/10/24  0240 07/11/24  0359   WBC 6.29 5.58   HGB 13.2* 13.1*   HCT 47.9 48.8    166     CMP:   Recent Labs   Lab 07/10/24  0240 07/10/24  1228 07/11/24  0359    139 138   K 3.4* 4.2 3.7   CL 89* 89* 90*   CO2 41* 43* 40*   * 112* 110   BUN 75* 75* 78*   CREATININE 1.7* 1.7* 2.0*   CALCIUM 9.3 9.3 9.5   PROT 7.1  --  7.4   ALBUMIN 2.5*  --  2.6*   BILITOT 1.5*  --  1.1*   ALKPHOS 121  --  123   AST 20  --  21   ALT 8*  --  11   ANIONGAP 8 7* 8       Significant Imaging: I have reviewed all pertinent imaging results/findings within the past 24  hours.      Assessment/Plan:      * Right ventricular dysfunction  NYHA class III symptoms with recurrent admissions.  He was seen by [facility] outpatient 6/2024 who state patient was feeling better on new diuretic regimen however worried about the frequency of use of metolazone and his worsening kidney function.       7/4 CXR with cardiomegaly and pulmonary vascular congestion, suggestive of pulmonary edema secondary to CHF. BNP elevated at 800. Creatinine worse at 3.1 on admission.     Given his recurrent HF admissions and most recent hemodynamics, [facility] believes his overall prognosis is poor and recommended palliative care consult. Inpatient consult has been placed.     2 gram sodium restriction and 1500cc fluid restriction.  Encourage physical activity with graded exercise program.    7/11:Palliative care saw the patient today, no changes in treatment plan mentioned. Will continue with current treatment plan.    Plan:  -- Placed central line for CVP monitoring  -- Continue lasix gtt, increased from 15mg/hr to 30mg/hr, continue monitor urine output  -- Add acetazolamide for metabolic alkalosis, today CO2 is 40, will give another dose 500mg today.  -- Will stop Nitroprusside gtt today, f/u on thiosulfate lab result  -- Initiated GDMT w/ low dose Bidil as tolerated, 10mg hydrlazine TID, 10mg isosorbide dinitrate 10mg TID.  -- Monitor lytes BID; replete as indicated    Paroxysmal A-fib  On coumadin.    Lab Results   Component Value Date    INR 1.5 (H) 07/11/2024    INR 1.4 (H) 07/10/2024    INR 1.5 (H) 07/09/2024     -- Goal INR 2-3  -- Titrate warfarin daily. INR today is 1.5, will increase the warfarin dose to 10mg.      MALLIKA (obstructive sleep apnea)  See 'hypoventilation syndrome'    Pulmonary hypertension  WHO group 2-3 per his managing pulmonologist (Danyell at [facility])    Adherent with diet, CPAP, and on continuous O2 3L at home. Currently requiring 50L High flow 70% O2  In spite of latter, he continues to be  polycythemic.     -- RV dysfunction treatment as above    Hypoventilation syndrome  BIPAP QHS; HFNC during the day; wean as tolerated      VTE Risk Mitigation (From admission, onward)           Ordered     warfarin (COUMADIN) tablet 5 mg  Daily         07/11/24 0750     warfarin (COUMADIN) tablet 10 mg  Daily         07/11/24 0750     IP VTE HIGH RISK PATIENT  Once         07/04/24 2302     Place sequential compression device  Until discontinued         07/04/24 2302                    Discharge Planning   ESTEBAN: 7/12/2024     Code Status: Full Code   Is the patient medically ready for discharge?:     Reason for patient still in hospital (select all that apply): Treatment  Discharge Plan A: Home                  Chen Fang [facility]  Department of Hospital Medicine   [facility] - Cardiac Intensive Care

## 2024-07-11 NOTE — ASSESSMENT & PLAN NOTE
On coumadin.    Lab Results   Component Value Date    INR 1.5 (H) 07/11/2024    INR 1.4 (H) 07/10/2024    INR 1.5 (H) 07/09/2024     -- Goal INR 2-3  -- Titrate warfarin daily

## 2024-07-11 NOTE — ASSESSMENT & PLAN NOTE
WHO group 2-3 per his managing pulmonologist (Danyell at [facility])    Adherent with diet, CPAP, and on continuous O2 3L at home. Currently requiring 50L High flow 70% O2  In spite of latter, he continues to be polycythemic.     -- RV dysfunction treatment as above

## 2024-07-11 NOTE — PLAN OF CARE
Cardiac ICU Care Plan    POC reviewed with Yong Mcintyre as well as his sister Paula over the phone today. Questions and concerns addressed. No acute events today. Pt remains on AIRVO for oxygenation support; O2Sat goal >=88%.  Lasix gtt continues to infuse and additional Diamox administered x2 d/t decreased UO.  Electrolytes replaced as needed.  Palliative care following d/t lack of advanced options.  Plan to continue with symptom management of pHTN and HF.  Pt progressing toward goals. See below and flowsheets for full assessment and VS info.     Neuro:  Geoff Coma Scale  Best Eye Response: 4-->(E4) spontaneous  Best Motor Response: 6-->(M6) obeys commands  Best Verbal Response: 5-->(V5) oriented  Vaughn Coma Scale Score: 15  Assessment Qualifiers: patient not sedated/intubated, no eye obstruction present  Pupil PERRLA: yes    24 hr Temp:  [97.8 °F (36.6 °C)-98.8 °F (37.1 °C)]      CV:  Rhythm: sinus tachycardia   DVT prophylaxis: VTE Required Core Measure: Pharmacological prophylaxis initiated/maintained (Coumadin)    CVP (mean): 16 mmHg (07/11/24 0730)  CVP (mean): 20 mmHg (07/11/24 1105)  CVP (mean): 21 mmHg (07/11/24 1545)    SVO2 (%): 62 % (07/11/24 0825)    Pulses  Right Radial Pulse: 2+ (normal)  Left Radial Pulse: 2+ (normal)  Right Dorsalis Pedis Pulse: 2+ (normal)  Left Dorsalis Pedis Pulse: 2+ (normal)  Right Posterior Tibial Pulse: 1+ (weak)  Left Posterior Tibial Pulse: 1+ (weak)    Resp:  AIRVO continuously and BiPAP PRN and HS  Flow (L/min) (Oxygen Therapy): 45  Oxygen Concentration (%): 70    GI/:  GI prophylaxis: Protonix PO Daily  Diet/Nutrition Received: 2 gram sodium  Last Bowel Movement: 07/11/24  Voiding Characteristics: voids spontaneously without difficulty via urinal  Intake/Output Summary (Last 24 hours) at 7/11/2024 1524  Last data filed at 7/11/2024 1515  Gross per 24 hour   Intake 1411.05 ml   Output 2028 ml   Net -616.95 ml     Labs/Accuchecks:  Recent Labs   Lab  07/09/24  0116 07/10/24  0240 07/11/24  0359   WBC 6.31 6.29 5.58   RBC 6.01 5.51 5.41   HGB 14.1 13.2* 13.1*   HCT 51.5 47.9 48.8    169 166      Recent Labs   Lab 07/09/24  0116 07/10/24  0240 07/11/24  0359   INR 1.5* 1.4* 1.5*      Recent Labs     07/11/24  0359 07/11/24  1323    136   K 3.7 4.0   CO2 40* 39*   CL 90* 92*   BUN 78* 77*   CREATININE 2.0* 2.0*   ALKPHOS 123  --    ALT 11  --    AST 21  --    BILITOT 1.1*  --        Recent Labs   Lab 07/04/24  1830   TROPONINI 0.018      Recent Labs     07/09/24  0833 07/09/24  2013 07/10/24  0739 07/10/24  2011 07/11/24  0823   PH 7.358  --  7.300* 7.306*  --    PCO2 87.0*  --  92.8* 77.3*  --    PO2 33*   < > 47 33* 37*   HCO3 48.9*  --  45.6* 38.5*  --    POCSATURATED 55   < > 74 54 62   BE 23*  --  19* 12*  --     < > = values in this interval not displayed.     Electrolytes: Electrolytes replaced  Accuchecks: none    Gtts/LDAs:   furosemide (Lasix) 500 mg in 50 mL infusion (conc: 10 mg/mL)  30 mg/hr Intravenous Continuous 3 mL/hr at 07/11/24 1423 30 mg/hr at 07/11/24 1423       Lines/Drains/Airways       Central Venous Catheter Line  Duration             Percutaneous Central Line - Triple Lumen  07/08/24 1145 Internal Jugular Right 3 days                  Skin/Wounds  Bathing/Skin Care: bath, complete;back care;dressed/undressed;electrode patches/site rotation;linen changed;foot care (07/11/24 1025)  Pressure reduction techniques in use.

## 2024-07-11 NOTE — PROGRESS NOTES
BP below baseline since transitioning to scheduled Isosorbide and Hydralazine PO.  Pt scheduled to receive another dose this afternoon. Dr. Ingram notified.  Instructed to recheck BP and hold afternoon doses if MAP<65.     MAP improved--able to administer afternoon doses. Discussed updated medication regimen with pt and family.      07/11/24 1405   Vital Signs   Pulse 110   Heart Rate Source Monitor;Continuous   Resp (!) 25   SpO2 (!) 91 %   Pulse Oximetry Type Continuous   Flow (L/min) (Oxygen Therapy) 45   Oxygen Concentration (%) 70   Device (Oxygen Therapy) high flow nasal cannula w/device   BP (!) 82/51   MAP (mmHg) 62   BP Location Right arm   BP Method Automatic   Patient Position Lying

## 2024-07-11 NOTE — PROGRESS NOTES
[facility] - Cardiac Intensive Care  Palliative Medicine  Progress Note    Patient Name: Yong Mcintyre  MRN: [id number]  Admission Date: 7/4/2024  Hospital Length of Stay: 7 days  Code Status: Full Code   Attending Provider: Fermin Davis MD  Consulting Provider: WINDY Rodriguez  Primary Care Physician: Gianni Escalona MD  Principal Problem:Right ventricular dysfunction    Patient information was obtained from patient and primary team.      Assessment/Plan:     Palliative Care  Palliative care encounter  Impression: Pt is a 49 y/o male with severe Pulmonary HTN.Pt is AAOx4. Pt is on high flow O2 45 L @ 70%.     Reason for consult: GOC/ACP. Communicated with Team.     GOC/ACP:   Today: Met with pt who reported he was not aware he was this sick from his PHTN. Pt is hopeful he can be diuresed more and his comfort flow needs can decrease. Pt is aware he is on a lot of O2 per High flow O2 that can only be done in [facility].     Support given to pt.     7/8/24 Met with pt who is on comfort flow O2 45 L 70%. Pt to have central line placed today. Pt's goal is to go back home. We discussed barrier to home needing to be on comfort flow. Pt is hopeful he can be diuresed more to help wean O2 down.     Pal care will continue to follow.       7/5/24  Met with pt who is sitting up in bed. Pt is aware he has pulmonary HTN and that he will continue to decline from disease. Per pt, he sees Dr. Child for his Pulmonary HTN.  Per pt his goal is to continue medical treatment for his PHTN.      Pt open to continue pal care f/u. Pt's reports he lives by himself and his parents are NOK. Father lives locally and mother lives in [place]. Per pt they are aware of his medical issues.   Per pt if unable to make his own medical decisions he wants his parents to make them.     Symptom management:     Dyspnea:   Pt on high flow O2 at 35 L 45%  Pt reports no dyspnea on O2.     Pt denies pain, anxiety.     Plan:   Pal care will continue to  follow.               I will follow-up with patient. Please contact us if you have any additional questions.    Subjective:     Chief Complaint:   Chief Complaint   Patient presents with    Leg Swelling     On home oxygen 3l, gave self 3 puffs albuterol in triage, said got sob with walking        HPI:   Pt is a 48 y.o. male with severe pulmonary HTN (Group 2-3, on 3L home O2, diagnosed prior to 2015, follows with Dr. Child at [facility], MALLIKA, Obesity hypoventilation syndrome, HFpEF, Paroxysmal AFib (on Coumadin), BMI > 35, HTN who presents for worsening shortness of breath and lower extremity swelling over the past 2 weeks. This is his 5th admission this year. He reports compliance with his medications. His diuretic regimen was adjusted to the following; Torsemide 60 mg twice daily and metolazone 2.5 on M/WF/Sunday. Clinically reports NYHA class III symptoms. He reports his dry weight is 223lbs and is currently weighing in at 245lbs. He uses 3L of O2 at home and is currently on high flow 35L at 45%     Hospital Course:  No notes on file    Interval History: Pt has severe Pulmonary HTN    Past Medical History:   Diagnosis Date    Arthritis     CHF (congestive heart failure)     Diastolic dysfunction    Cor pulmonale 11/05/2012    Gallstones     GERD (gastroesophageal reflux disease)     Hypertension     Morbid obesity 11/05/2012    Obesity hypoventilation syndrome     On home oxygen therapy 03/07/2014    MALLIKA (obstructive sleep apnea)     BPAP 16/11 with 3 L at night (continuous O2).    Paroxysmal atrial fibrillation     PFO (patent foramen ovale) 11/05/2012    status post closure    Pickwickian syndrome 11/05/2012    Pulmonary hypertension        Past Surgical History:   Procedure Laterality Date    CHOLECYSTECTOMY      RIGHT HEART CATHETERIZATION Right 03/22/2022    Procedure: INSERTION, CATHETER, RIGHT HEART;  Surgeon: Tony Martínez MD;  Location: [facility] CATH LAB;  Service: Cardiology;  Laterality: Right;    RIGHT  "HEART CATHETERIZATION Right 01/31/2024    Procedure: INSERTION, CATHETER, RIGHT HEART;  Surgeon: Sheri Ritter MD;  Location: [facility] CATH LAB;  Service: Cardiology;  Laterality: Right;    UPPER GASTROINTESTINAL ENDOSCOPY      2-3 years ago       Review of patient's allergies indicates:   Allergen Reactions    Lipitor [atorvastatin] Other (See Comments)     "it makes my legs feel like jelly"  Other reaction(s): causes legs to hurt       Medications:  Continuous Infusions:   furosemide (Lasix) 500 mg in 50 mL infusion (conc: 10 mg/mL)  30 mg/hr Intravenous Continuous 3 mL/hr at 07/11/24 1005 30 mg/hr at 07/11/24 1005     Scheduled Meds:   allopurinoL  300 mg Oral Daily    fluticasone-salmeterol 250-50 mcg/dose  1 puff Inhalation BID    hydrALAZINE  10 mg Oral TID    isosorbide dinitrate  10 mg Oral TID    pantoprazole  40 mg Oral Daily    tiotropium bromide  2 puff Inhalation Daily    warfarin  10 mg Oral Daily    [START ON 7/12/2024] warfarin  5 mg Oral Daily     PRN Meds:  Current Facility-Administered Medications:     0.9% NaCl, , Intravenous, PRN    0.9% NaCl, , Intravenous, PRN    acetaminophen, 650 mg, Oral, Q6H PRN    albuterol, 2 puff, Inhalation, Q4H PRN    melatonin, 6 mg, Oral, Nightly PRN    sodium chloride 0.9%, 10 mL, Intravenous, PRN    Family History       Problem Relation (Age of Onset)    Arthritis Mother, Maternal Grandmother, Maternal Grandfather    Asthma Mother, Sister, Maternal Grandmother    Breast cancer Paternal Grandmother    Diabetes Maternal Grandmother    Down syndrome Brother    Gout Brother    Hypertension Father, Maternal Grandmother, Maternal Grandfather, Paternal Grandfather          Tobacco Use    Smoking status: Never    Smokeless tobacco: Never   Substance and Sexual Activity    Alcohol use: Yes     Comment: holidays  rare    Drug use: No    Sexual activity: Not Currently     Partners: Female       Review of Systems   Respiratory:  Positive for shortness of breath.  "   Allergic/Immunologic: Negative for food allergies.     Objective:     Vital Signs (Most Recent):  Temp: 98.5 °F (36.9 °C) (07/11/24 0730)  Pulse: 108 (07/11/24 0905)  Resp: 19 (07/11/24 0905)  BP: (!) 93/54 (07/11/24 0905)  SpO2: (!) 90 % (07/11/24 0905) Vital Signs (24h Range):  Temp:  [97.8 °F (36.6 °C)-98.8 °F (37.1 °C)] 98.5 °F (36.9 °C)  Pulse:  [] 108  Resp:  [11-39] 19  SpO2:  [84 %-97 %] 90 %  BP: ()/(31-78) 93/54     Weight: 106 kg (233 lb 11 oz)  Body mass index is 38.89 kg/m².       Physical Exam  Constitutional:       Interventions: Nasal cannula in place.      Comments: Comfort flow on at 35 L 45 %   HENT:      Head: Normocephalic and atraumatic.   Cardiovascular:      Rate and Rhythm: Normal rate.   Pulmonary:      Effort: Pulmonary effort is normal. No respiratory distress.      Comments: Pt on comfort flow O@ per NC  Abdominal:      General: There is distension.   Skin:     General: Skin is warm and dry.   Neurological:      Mental Status: He is alert and oriented to person, place, and time.   Psychiatric:         Behavior: Behavior is cooperative.            Review of Symptoms      Symptom Assessment (ESAS 0-10 Scale)  Pain:  0  Dyspnea:  0  Anxiety:  0  Nausea:  0  Depression:  0  Anorexia:  0  Fatigue:  0  Insomnia:  0  Restlessness:  0  Agitation:  0         Performance Status:  70    Living Arrangements:  Lives alone    Psychosocial/Cultural:   See Palliative Psychosocial Note: No  Pt lives alone, no adult children. Pt's Dad lives in [facility] and pt's mother lives in [place]. Per pt, they are both aware of his medical issues they get along.   **Primary  to Follow**  Palliative Care  Consult: Yes        Advance Care Planning  Advance Care Planning       Significant Labs: All pertinent labs within the past 24 hours have been reviewed.  CBC:   Recent Labs   Lab 07/11/24  0359   WBC 5.58   HGB 13.1*   HCT 48.8   MCV 90        BMP:  Recent Labs   Lab  07/11/24  0359         K 3.7   CL 90*   CO2 40*   BUN 78*   CREATININE 2.0*   CALCIUM 9.5   MG 2.1     LFT:  Lab Results   Component Value Date    AST 21 07/11/2024    ALKPHOS 123 07/11/2024    BILITOT 1.1 (H) 07/11/2024     Albumin:   Albumin   Date Value Ref Range Status   07/11/2024 2.6 (L) 3.5 - 5.2 g/dL Final     Protein:   Total Protein   Date Value Ref Range Status   07/11/2024 7.4 6.0 - 8.4 g/dL Final     Lactic acid:   Lab Results   Component Value Date    LACTATE 1.1 07/05/2024    LACTATE 1.1 04/22/2024       Significant Imaging: I have reviewed all pertinent imaging results/findings within the past 24 hours.    > 50% of  55 min visit spent in chart review, face to face discussion of goals of care,  symptom assessment, charting,  coordination of care and emotional support     Lisa Brito, CNS  Palliative Medicine  [facility] - Cardiac Intensive Care

## 2024-07-11 NOTE — ASSESSMENT & PLAN NOTE
Impression: Pt is a 47 y/o male with severe Pulmonary HTN.Pt is AAOx4. Pt is on high flow O2 45 L @ 70%.     Reason for consult: GOC/ACP. Communicated with Team.     GOC/ACP:   Today: Met with pt who reported he was not aware he was this sick from his PHTN. Pt is hopeful he can be diuresed more and his comfort flow needs can decrease. Pt is aware he is on a lot of O2 per High flow O2 that can only be done in [place].     Support given to pt.     7/8/24 Met with pt who is on comfort flow O2 45 L 70%. Pt to have central line placed today. Pt's goal is to go back home. We discussed barrier to home needing to be on comfort flow. Pt is hopeful he can be diuresed more to help wean O2 down.     Pal care will continue to follow.       7/5/24  Met with pt who is sitting up in bed. Pt is aware he has pulmonary HTN and that he will continue to decline from disease. Per pt, he sees Dr. Child for his Pulmonary HTN.  Per pt his goal is to continue medical treatment for his PHTN.      Pt open to continue pal care f/u. Pt's reports he lives by himself and his parents are NOK. Father lives locally and mother lives in [place]. Per pt they are aware of his medical issues.   Per pt if unable to make his own medical decisions he wants his parents to make them.     Symptom management:     Dyspnea:   Pt on high flow O2 at 35 L 45%  Pt reports no dyspnea on O2.     Pt denies pain, anxiety.     Plan:   Pal care will continue to follow.

## 2024-07-11 NOTE — SUBJECTIVE & OBJECTIVE
Interval History: No acute overnight events. Cr 2.0 from 1.7 yesterday. 2L urine output yesterday. Lasix gtt was increased to 30 at 5 PM yesterday. Will continue w/ lasix, diamox, trend Cr. Plan to stop Nipride and initiate GDMT as tolerated. Will up-titrate warfarin given subtherapeutic INR.    Review of Systems   Constitutional: Positive for weight gain. Negative for fever.   HENT:  Negative for congestion.    Cardiovascular:  Negative for leg swelling, near-syncope, palpitations and syncope.   Respiratory:  Positive for shortness of breath.    Gastrointestinal:  Negative for abdominal pain and heartburn.   Genitourinary:  Negative for dysuria and hematuria.   Psychiatric/Behavioral:  The patient is not nervous/anxious.    All other systems reviewed and are negative.    Objective:     Vital Signs (Most Recent):  Temp: 98.5 °F (36.9 °C) (07/11/24 0730)  Pulse: 107 (07/11/24 0823)  Resp: 15 (07/11/24 0823)  BP: (!) 91/54 (07/11/24 0818)  SpO2: (!) 92 % (07/11/24 0823) Vital Signs (24h Range):  Temp:  [97.8 °F (36.6 °C)-98.8 °F (37.1 °C)] 98.5 °F (36.9 °C)  Pulse:  [] 107  Resp:  [11-39] 15  SpO2:  [83 %-97 %] 92 %  BP: ()/(31-78) 91/54     Weight: 106 kg (233 lb 11 oz)  Body mass index is 38.89 kg/m².     SpO2: (!) 92 %         Intake/Output Summary (Last 24 hours) at 7/11/2024 0839  Last data filed at 7/11/2024 0755  Gross per 24 hour   Intake 1185.73 ml   Output 1918 ml   Net -732.27 ml       Lines/Drains/Airways       Central Venous Catheter Line  Duration             Percutaneous Central Line - Triple Lumen  07/08/24 1145 Internal Jugular Right 2 days              Peripheral Intravenous Line  Duration                  Peripheral IV - Single Lumen 07/07/24 2007 20 G Left;Posterior Hand 3 days         Peripheral IV - Single Lumen 07/07/24 2018 20 G Posterior;Right Wrist 3 days                       Physical Exam  Vitals and nursing note reviewed.   Constitutional:       Appearance: Normal appearance.    HENT:      Right Ear: External ear normal.      Left Ear: External ear normal.      Mouth/Throat:      Mouth: Mucous membranes are moist.      Pharynx: No posterior oropharyngeal erythema.   Eyes:      Extraocular Movements: Extraocular movements intact.      Conjunctiva/sclera: Conjunctivae normal.   Cardiovascular:      Rate and Rhythm: Regular rhythm. Tachycardia present.   Pulmonary:      Effort: No respiratory distress.      Breath sounds: Rhonchi present. No wheezing.   Abdominal:      General: There is distension.   Musculoskeletal:      Right lower leg: Edema (2+) present.      Left lower leg: Edema (2+) present.   Skin:     General: Skin is warm.      Findings: No rash.   Neurological:      Mental Status: He is alert and oriented to person, place, and time. Mental status is at baseline.       Significant Labs: CMP   Recent Labs   Lab 07/10/24  0240 07/10/24  1228 07/11/24  0359    139 138   K 3.4* 4.2 3.7   CL 89* 89* 90*   CO2 41* 43* 40*   * 112* 110   BUN 75* 75* 78*   CREATININE 1.7* 1.7* 2.0*   CALCIUM 9.3 9.3 9.5   PROT 7.1  --  7.4   ALBUMIN 2.5*  --  2.6*   BILITOT 1.5*  --  1.1*   ALKPHOS 121  --  123   AST 20  --  21   ALT 8*  --  11   ANIONGAP 8 7* 8   , CBC   Recent Labs   Lab 07/10/24  0240 07/11/24  0359   WBC 6.29 5.58   HGB 13.2* 13.1*   HCT 47.9 48.8    166   , and All pertinent lab results from the last 24 hours have been reviewed.

## 2024-07-11 NOTE — PROGRESS NOTES
[facility] - Cardiac Intensive Care  Cardiology  Progress Note    Patient Name: Yong Mcintyre  MRN: [id number]  Admission Date: 7/4/2024  Hospital Length of Stay: 7 days  Code Status: Full Code   Attending Physician: Fermin Davis MD   Primary Care Physician: Gianni Escalona MD  Expected Discharge Date: 7/12/2024  Principal Problem:Right ventricular dysfunction    Subjective:     Hospital Course:   The patient was admitted to the CCU for further management of right-sided heart failure. He was diuresed with a lasix gtt. Electrolytes were monitored and repleted as indicated. Palliative care was consulted given his poor prognosis. The patient had significant O2 requirements on admission which were slow to wean. A central line was placed for close CVP monitoring in the setting of aggressive diuresis. Nutrition consult placed for low salt diet education. Acetazolamide was added to his diuretic regimen due to persistent metabolic alkalosis.    Interval History: No acute overnight events. Cr 2.0 from 1.7 yesterday. 2L urine output yesterday. Lasix gtt was increased to 30 at 5 PM yesterday. Will continue w/ lasix, diamox, trend Cr. Plan to stop Nipride and initiate GDMT as tolerated. Will up-titrate warfarin given subtherapeutic INR.    Review of Systems   Constitutional: Positive for weight gain. Negative for fever.   HENT:  Negative for congestion.    Cardiovascular:  Negative for leg swelling, near-syncope, palpitations and syncope.   Respiratory:  Positive for shortness of breath.    Gastrointestinal:  Negative for abdominal pain and heartburn.   Genitourinary:  Negative for dysuria and hematuria.   Psychiatric/Behavioral:  The patient is not nervous/anxious.    All other systems reviewed and are negative.    Objective:     Vital Signs (Most Recent):  Temp: 98.5 °F (36.9 °C) (07/11/24 0730)  Pulse: 107 (07/11/24 0823)  Resp: 15 (07/11/24 0823)  BP: (!) 91/54 (07/11/24 0818)  SpO2: (!) 92 % (07/11/24 0823) Vital  Signs (24h Range):  Temp:  [97.8 °F (36.6 °C)-98.8 °F (37.1 °C)] 98.5 °F (36.9 °C)  Pulse:  [] 107  Resp:  [11-39] 15  SpO2:  [83 %-97 %] 92 %  BP: ()/(31-78) 91/54     Weight: 106 kg (233 lb 11 oz)  Body mass index is 38.89 kg/m².     SpO2: (!) 92 %         Intake/Output Summary (Last 24 hours) at 7/11/2024 0839  Last data filed at 7/11/2024 0755  Gross per 24 hour   Intake 1185.73 ml   Output 1918 ml   Net -732.27 ml       Lines/Drains/Airways       Central Venous Catheter Line  Duration             Percutaneous Central Line - Triple Lumen  07/08/24 1145 Internal Jugular Right 2 days              Peripheral Intravenous Line  Duration                  Peripheral IV - Single Lumen 07/07/24 2007 20 G Left;Posterior Hand 3 days         Peripheral IV - Single Lumen 07/07/24 2018 20 G Posterior;Right Wrist 3 days                       Physical Exam  Vitals and nursing note reviewed.   Constitutional:       Appearance: Normal appearance.   HENT:      Right Ear: External ear normal.      Left Ear: External ear normal.      Mouth/Throat:      Mouth: Mucous membranes are moist.      Pharynx: No posterior oropharyngeal erythema.   Eyes:      Extraocular Movements: Extraocular movements intact.      Conjunctiva/sclera: Conjunctivae normal.   Cardiovascular:      Rate and Rhythm: Regular rhythm. Tachycardia present.   Pulmonary:      Effort: No respiratory distress.      Breath sounds: Rhonchi present. No wheezing.   Abdominal:      General: There is distension.   Musculoskeletal:      Right lower leg: Edema (2+) present.      Left lower leg: Edema (2+) present.   Skin:     General: Skin is warm.      Findings: No rash.   Neurological:      Mental Status: He is alert and oriented to person, place, and time. Mental status is at baseline.       Significant Labs: CMP   Recent Labs   Lab 07/10/24  0240 07/10/24  1228 07/11/24  0359    139 138   K 3.4* 4.2 3.7   CL 89* 89* 90*   CO2 41* 43* 40*   * 112* 110    BUN 75* 75* 78*   CREATININE 1.7* 1.7* 2.0*   CALCIUM 9.3 9.3 9.5   PROT 7.1  --  7.4   ALBUMIN 2.5*  --  2.6*   BILITOT 1.5*  --  1.1*   ALKPHOS 121  --  123   AST 20  --  21   ALT 8*  --  11   ANIONGAP 8 7* 8   , CBC   Recent Labs   Lab 07/10/24  0240 07/11/24  0359   WBC 6.29 5.58   HGB 13.2* 13.1*   HCT 47.9 48.8    166   , and All pertinent lab results from the last 24 hours have been reviewed.  Assessment and Plan:     * Right ventricular dysfunction  Acute on chronic.  NYHA class III symptoms with recurrent admissions.  He was seen by [facility] outpatient 6/2024 who state patient was feeling better on new diuretic regimen however worried about the frequency of use of metolazone and his worsening kidney function.       CXR with cardiomegaly and pulmonary vascular congestion, suggestive of pulmonary edema secondary to CHF. BNP elevated at 800. Creatinine worse at 3.1 on admission.     Given his recurrent HF admissions and most recent hemodynamics, [facility] believes his overall prognosis is poor and recommended palliative care consult. Inpatient consult has been placed.     2 gram sodium restriction and 1500cc fluid restriction.  Encourage physical activity with graded exercise program.    Hemodynamics 7/11 AM:  CVP 17, SvO2 54, CO 5.3, CI 2.4,     -- Placed central line for CVP monitoring  -- Continue lasix gtt at 30  -- Add acetazolamide  -- Will stop Nitroprusside gtt  -- Initiated GDMT w/ low dose Bidil as tolerated  -- Monitor lytes BID; replete as indicated    Paroxysmal A-fib  On coumadin.    Lab Results   Component Value Date    INR 1.5 (H) 07/11/2024    INR 1.4 (H) 07/10/2024    INR 1.5 (H) 07/09/2024     -- Goal INR 2-3  -- Titrate warfarin daily      MALLIKA (obstructive sleep apnea)  See 'hypoventilation syndrome'    Pulmonary hypertension  WHO group 2-3 per his managing pulmonologist (Danyell at [facility])    Adherent with diet, CPAP, and on continuous O2 3L at home. Currently requiring 50L High flow  70% O2  In spite of latter, he continues to be polycythemic.     -- RV dysfunction treatment as above    Hypoventilation syndrome  BIPAP QHS; HFNC during the day; wean as tolerated        VTE Risk Mitigation (From admission, onward)           Ordered     warfarin (COUMADIN) tablet 5 mg  Daily         07/11/24 0750     warfarin (COUMADIN) tablet 10 mg  Daily         07/11/24 0750     IP VTE HIGH RISK PATIENT  Once         07/04/24 2302     Place sequential compression device  Until discontinued         07/04/24 2302                    Vik White MD  Cardiology  [facility] - Cardiac Intensive Care

## 2024-07-11 NOTE — PLAN OF CARE
CICU DAILY GOALS       A: Awake    RASS: Goal - RASS Goal: 0-->alert and calm  Actual - RASS (Carter Agitation-Sedation Scale): alert and calm   Restraint necessity:    B: Breath   SBT: Not attempted   C: Coordinate A & B, analgesics/sedatives   Pain: managed    SAT: Not attempted  D: Delirium   CAM-ICU: Overall CAM-ICU: Negative  E: Early(intubated/ Progressive (non-intubated) Mobility   MOVE Screen: Pass   Activity: Activity Management: Arm raise - L1, Bridge - L1  FAS: Feeding/Nutrition   Diet order: Diet/Nutrition Received: low saturated fat/low cholesterol, 2 gram sodium,   Fluid restriction: Fluid Requirement: 1.5 L FR   Nutritional Supplement Intake: Quantity 0, Type: Boost  T: Thrombus   DVT prophylaxis: VTE Required Core Measure: Pharmacological prophylaxis initiated/maintained  H: HOB Elevation   Head of Bed (HOB) Positioning: HOB elevated  U: Ulcer Prophylaxis   GI: yes  G: Glucose control   managed      S: Skin   Nurses Note -- 4 Eyes  Skin assessed during: Daily Assessment   CHOOSE ONE:  [x] No Altered Skin Integrity Present      []Prevention Measures Documented    [] Yes- Altered Skin Integrity Present or Discovered     [] LDA Added if Not in Epic (Describe Wound)     [] New Altered Skin Integrity was Present on Admit and Documented in LDA     [] Wound Image Taken  Wound Care Consulted? No  Attending Nurse:  Ilana Natarajan RN/Staff Member:  Yes    B: Bowel Function   no issues   I: Indwelling Catheters   Dunne necessity:     CVC necessity: Yes   IPAD offered: No  D: De-escalation Antibx   No  Plan for the day   diuresis  Family/Goals of care/Code Status   Code Status: Full Code     No acute events throughout day, VS and assessment per flow sheet, patient progressing towards goals as tolerated, plan of care reviewed with Yong Mcintyre and family, all concerns addressed, will continue to monitor.

## 2024-07-12 LAB
ALBUMIN SERPL BCP-MCNC: 2.5 G/DL (ref 3.5–5.2)
ALLENS TEST: ABNORMAL
ALP SERPL-CCNC: 128 U/L (ref 55–135)
ALT SERPL W/O P-5'-P-CCNC: 9 U/L (ref 10–44)
ANION GAP SERPL CALC-SCNC: 9 MMOL/L (ref 8–16)
AST SERPL-CCNC: 19 U/L (ref 10–40)
BASOPHILS # BLD AUTO: 0.04 K/UL (ref 0–0.2)
BASOPHILS NFR BLD: 0.7 % (ref 0–1.9)
BILIRUB SERPL-MCNC: 0.9 MG/DL (ref 0.1–1)
BUN SERPL-MCNC: 80 MG/DL (ref 6–20)
CALCIUM SERPL-MCNC: 9.4 MG/DL (ref 8.7–10.5)
CHLORIDE SERPL-SCNC: 92 MMOL/L (ref 95–110)
CO2 SERPL-SCNC: 36 MMOL/L (ref 23–29)
CREAT SERPL-MCNC: 2.1 MG/DL (ref 0.5–1.4)
DELSYS: ABNORMAL
DIFFERENTIAL METHOD BLD: ABNORMAL
EOSINOPHIL # BLD AUTO: 0.2 K/UL (ref 0–0.5)
EOSINOPHIL NFR BLD: 2.7 % (ref 0–8)
EP: 11
EP: 11
ERYTHROCYTE [DISTWIDTH] IN BLOOD BY AUTOMATED COUNT: 22.2 % (ref 11.5–14.5)
ERYTHROCYTE [SEDIMENTATION RATE] IN BLOOD BY WESTERGREN METHOD: 16 MM/H
ERYTHROCYTE [SEDIMENTATION RATE] IN BLOOD BY WESTERGREN METHOD: 24 MM/H
EST. GFR  (NO RACE VARIABLE): 38.1 ML/MIN/1.73 M^2
FIO2: 75
FLOW: 45
GLUCOSE SERPL-MCNC: 118 MG/DL (ref 70–110)
HCO3 UR-SCNC: 37.1 MMOL/L (ref 24–28)
HCO3 UR-SCNC: 40.5 MMOL/L (ref 24–28)
HCO3 UR-SCNC: 42.5 MMOL/L (ref 24–28)
HCT VFR BLD AUTO: 46.5 % (ref 40–54)
HGB BLD-MCNC: 12.8 G/DL (ref 14–18)
IMM GRANULOCYTES # BLD AUTO: 0.02 K/UL (ref 0–0.04)
IMM GRANULOCYTES NFR BLD AUTO: 0.3 % (ref 0–0.5)
INR PPP: 1.7 (ref 0.8–1.2)
IP: 16
IP: 16
LYMPHOCYTES # BLD AUTO: 1 K/UL (ref 1–4.8)
LYMPHOCYTES NFR BLD: 17.1 % (ref 18–48)
MAGNESIUM SERPL-MCNC: 1.8 MG/DL (ref 1.6–2.6)
MAGNESIUM SERPL-MCNC: 2.1 MG/DL (ref 1.6–2.6)
MCH RBC QN AUTO: 24.2 PG (ref 27–31)
MCHC RBC AUTO-ENTMCNC: 27.5 G/DL (ref 32–36)
MCV RBC AUTO: 88 FL (ref 82–98)
MODE: ABNORMAL
MONOCYTES # BLD AUTO: 0.5 K/UL (ref 0.3–1)
MONOCYTES NFR BLD: 7.7 % (ref 4–15)
NEUTROPHILS # BLD AUTO: 4.2 K/UL (ref 1.8–7.7)
NEUTROPHILS NFR BLD: 71.5 % (ref 38–73)
NRBC BLD-RTO: 0 /100 WBC
OHS QRS DURATION: 100 MS
OHS QTC CALCULATION: 447 MS
PCO2 BLDA: 80.8 MMHG (ref 35–45)
PCO2 BLDA: 86.8 MMHG (ref 35–45)
PCO2 BLDA: 89.3 MMHG (ref 35–45)
PH SMN: 7.26 [PH] (ref 7.35–7.45)
PH SMN: 7.27 [PH] (ref 7.35–7.45)
PH SMN: 7.3 [PH] (ref 7.35–7.45)
PHOSPHATE SERPL-MCNC: 4.6 MG/DL (ref 2.7–4.5)
PLATELET # BLD AUTO: 165 K/UL (ref 150–450)
PMV BLD AUTO: 10.3 FL (ref 9.2–12.9)
PO2 BLDA: 38 MMHG (ref 40–60)
PO2 BLDA: 46 MMHG (ref 40–60)
PO2 BLDA: 48 MMHG (ref 40–60)
POC BE: 10 MMOL/L
POC BE: 14 MMOL/L
POC BE: 16 MMOL/L
POC SATURATED O2: 61 % (ref 95–100)
POC SATURATED O2: 72 % (ref 95–100)
POC SATURATED O2: 75 % (ref 95–100)
POC TCO2: 40 MMOL/L (ref 24–29)
POC TCO2: 43 MMOL/L (ref 24–29)
POC TCO2: 45 MMOL/L (ref 24–29)
POTASSIUM SERPL-SCNC: 3.5 MMOL/L (ref 3.5–5.1)
PROT SERPL-MCNC: 7.2 G/DL (ref 6–8.4)
PROTHROMBIN TIME: 18.5 SEC (ref 9–12.5)
RBC # BLD AUTO: 5.3 M/UL (ref 4.6–6.2)
SAMPLE: ABNORMAL
SITE: ABNORMAL
SODIUM SERPL-SCNC: 137 MMOL/L (ref 136–145)
SP02: 97
WBC # BLD AUTO: 5.84 K/UL (ref 3.9–12.7)

## 2024-07-12 PROCEDURE — 99291 CRITICAL CARE FIRST HOUR: CPT | Mod: GC,,, | Performed by: INTERNAL MEDICINE

## 2024-07-12 PROCEDURE — 94761 N-INVAS EAR/PLS OXIMETRY MLT: CPT

## 2024-07-12 PROCEDURE — 85025 COMPLETE CBC W/AUTO DIFF WBC: CPT

## 2024-07-12 PROCEDURE — 80048 BASIC METABOLIC PNL TOTAL CA: CPT | Mod: XB

## 2024-07-12 PROCEDURE — 20000000 HC ICU ROOM

## 2024-07-12 PROCEDURE — C1751 CATH, INF, PER/CENT/MIDLINE: HCPCS

## 2024-07-12 PROCEDURE — 80053 COMPREHEN METABOLIC PANEL: CPT

## 2024-07-12 PROCEDURE — 63600175 PHARM REV CODE 636 W HCPCS: Performed by: STUDENT IN AN ORGANIZED HEALTH CARE EDUCATION/TRAINING PROGRAM

## 2024-07-12 PROCEDURE — 94799 UNLISTED PULMONARY SVC/PX: CPT

## 2024-07-12 PROCEDURE — 93010 ELECTROCARDIOGRAM REPORT: CPT | Mod: ,,, | Performed by: INTERNAL MEDICINE

## 2024-07-12 PROCEDURE — 94660 CPAP INITIATION&MGMT: CPT

## 2024-07-12 PROCEDURE — 99900035 HC TECH TIME PER 15 MIN (STAT)

## 2024-07-12 PROCEDURE — 83735 ASSAY OF MAGNESIUM: CPT | Mod: 91

## 2024-07-12 PROCEDURE — 82803 BLOOD GASES ANY COMBINATION: CPT

## 2024-07-12 PROCEDURE — 84100 ASSAY OF PHOSPHORUS: CPT

## 2024-07-12 PROCEDURE — 83735 ASSAY OF MAGNESIUM: CPT | Mod: 91 | Performed by: INTERNAL MEDICINE

## 2024-07-12 PROCEDURE — 94640 AIRWAY INHALATION TREATMENT: CPT

## 2024-07-12 PROCEDURE — 63600175 PHARM REV CODE 636 W HCPCS

## 2024-07-12 PROCEDURE — 82800 BLOOD PH: CPT

## 2024-07-12 PROCEDURE — 25000003 PHARM REV CODE 250: Performed by: STUDENT IN AN ORGANIZED HEALTH CARE EDUCATION/TRAINING PROGRAM

## 2024-07-12 PROCEDURE — 93005 ELECTROCARDIOGRAM TRACING: CPT

## 2024-07-12 PROCEDURE — 25000003 PHARM REV CODE 250

## 2024-07-12 PROCEDURE — 85610 PROTHROMBIN TIME: CPT

## 2024-07-12 PROCEDURE — 27100171 HC OXYGEN HIGH FLOW UP TO 24 HOURS

## 2024-07-12 RX ORDER — MAGNESIUM SULFATE 1 G/100ML
1 INJECTION INTRAVENOUS ONCE
Status: COMPLETED | OUTPATIENT
Start: 2024-07-12 | End: 2024-07-12

## 2024-07-12 RX ORDER — MAGNESIUM SULFATE HEPTAHYDRATE 40 MG/ML
2 INJECTION, SOLUTION INTRAVENOUS ONCE
Status: DISCONTINUED | OUTPATIENT
Start: 2024-07-12 | End: 2024-07-14

## 2024-07-12 RX ORDER — WARFARIN SODIUM 5 MG/1
5 TABLET ORAL DAILY
Status: DISCONTINUED | OUTPATIENT
Start: 2024-07-13 | End: 2024-07-19

## 2024-07-12 RX ORDER — POTASSIUM CHLORIDE 29.8 MG/ML
40 INJECTION INTRAVENOUS EVERY 4 HOURS
Status: COMPLETED | OUTPATIENT
Start: 2024-07-12 | End: 2024-07-12

## 2024-07-12 RX ORDER — DOBUTAMINE HYDROCHLORIDE 400 MG/100ML
2.5 INJECTION INTRAVENOUS CONTINUOUS
Status: DISCONTINUED | OUTPATIENT
Start: 2024-07-12 | End: 2024-07-22

## 2024-07-12 RX ADMIN — DOBUTAMINE HYDROCHLORIDE 5 MCG/KG/MIN: 400 INJECTION INTRAVENOUS at 12:07

## 2024-07-12 RX ADMIN — TIOTROPIUM BROMIDE INHALATION SPRAY 2 PUFF: 3.12 SPRAY, METERED RESPIRATORY (INHALATION) at 11:07

## 2024-07-12 RX ADMIN — HYDRALAZINE HYDROCHLORIDE 10 MG: 10 TABLET, FILM COATED ORAL at 08:07

## 2024-07-12 RX ADMIN — ISOSORBIDE DINITRATE 10 MG: 10 TABLET ORAL at 08:07

## 2024-07-12 RX ADMIN — FLUTICASONE PROPIONATE AND SALMETEROL 1 PUFF: 50; 250 POWDER RESPIRATORY (INHALATION) at 11:07

## 2024-07-12 RX ADMIN — WARFARIN SODIUM 7.5 MG: 2.5 TABLET ORAL at 05:07

## 2024-07-12 RX ADMIN — POTASSIUM CHLORIDE 40 MEQ: 29.8 INJECTION, SOLUTION INTRAVENOUS at 05:07

## 2024-07-12 RX ADMIN — MAGNESIUM SULFATE HEPTAHYDRATE 1 G: 500 INJECTION, SOLUTION INTRAMUSCULAR; INTRAVENOUS at 06:07

## 2024-07-12 RX ADMIN — PANTOPRAZOLE SODIUM 40 MG: 40 TABLET, DELAYED RELEASE ORAL at 08:07

## 2024-07-12 RX ADMIN — POTASSIUM CHLORIDE 40 MEQ: 29.8 INJECTION, SOLUTION INTRAVENOUS at 10:07

## 2024-07-12 RX ADMIN — FUROSEMIDE 30 MG/HR: 10 INJECTION, SOLUTION INTRAMUSCULAR; INTRAVENOUS at 01:07

## 2024-07-12 RX ADMIN — ALLOPURINOL 300 MG: 100 TABLET ORAL at 08:07

## 2024-07-12 RX ADMIN — DEXTROSE 500 MG: 50 INJECTION, SOLUTION INTRAVENOUS at 01:07

## 2024-07-12 NOTE — PLAN OF CARE
[facility] - Cardiac Intensive Care  Discharge Reassessment    Primary Care Provider: Gianni Escalona MD    Expected Discharge Date: 7/16/2024    Reassessment (most recent)       Discharge Reassessment - 07/12/24 1509          Discharge Reassessment    Assessment Type Discharge Planning Reassessment     Did the patient's condition or plan change since previous assessment? No     Discharge Plan discussed with: Patient;Parent(s);Sibling     Communicated ESTEBAN with patient/caregiver Date not available/Unable to determine     Discharge Plan A Home     Discharge Plan B Home Health     DME Needed Upon Discharge  none     Transition of Care Barriers None     Why the patient remains in the hospital Requires continued medical care                   Pt still on HFNC and not medically stable for discharge.  Discharge Plan A and Plan B have been determined by review of patient's clinical status, future medical and therapeutic needs, and coverage/benefits for post-acute care in coordination with multidisciplinary team members.  Will continue to follow.      Elisa Olivo LMSW  [facility]  [phone]

## 2024-07-12 NOTE — ASSESSMENT & PLAN NOTE
WHO group 2-3 per his managing pulmonologist (Danyell at [facility])    Adherent with diet, CPAP, and on continuous O2 3L at home. Currently requiring 50L High flow 70% O2  In spite of latter, he continues to be polycythemic.     -- RV dysfunction treatment as above

## 2024-07-12 NOTE — SUBJECTIVE & OBJECTIVE
Interval History: No acute overnight events. Patient lethargic this morning. Will plan for more Bipap during the day due to hypercapnic respiratory failure. Will start dobutamine, continue diuresis.    Review of Systems   Constitutional: Positive for weight gain. Negative for fever.   HENT:  Negative for congestion.    Cardiovascular:  Negative for leg swelling, near-syncope, palpitations and syncope.   Respiratory:  Positive for shortness of breath.    Gastrointestinal:  Negative for abdominal pain and heartburn.   Genitourinary:  Negative for dysuria and hematuria.   Psychiatric/Behavioral:  The patient is not nervous/anxious.    All other systems reviewed and are negative.    Objective:     Vital Signs (Most Recent):  Temp: 97.7 °F (36.5 °C) (07/12/24 1505)  Pulse: 109 (07/12/24 1605)  Resp: 13 (07/12/24 1605)  BP: (!) 111/59 (07/12/24 1605)  SpO2: 97 % (07/12/24 1605) Vital Signs (24h Range):  Temp:  [97.7 °F (36.5 °C)-98.7 °F (37.1 °C)] 97.7 °F (36.5 °C)  Pulse:  [103-118] 109  Resp:  [11-39] 13  SpO2:  [84 %-100 %] 97 %  BP: ()/(47-76) 111/59     Weight: 104.5 kg (230 lb 6.1 oz)  Body mass index is 38.34 kg/m².     SpO2: 97 %         Intake/Output Summary (Last 24 hours) at 7/12/2024 1638  Last data filed at 7/12/2024 1540  Gross per 24 hour   Intake 1693.58 ml   Output 2300 ml   Net -606.42 ml       Lines/Drains/Airways       Central Venous Catheter Line  Duration             Percutaneous Central Line - Triple Lumen  07/08/24 1145 Internal Jugular Right 4 days              Peripheral Intravenous Line  Duration                  Peripheral IV - Single Lumen 07/12/24 1258 18 G 1 1/4 in No Distal;Left;Posterior Forearm <1 day                       Physical Exam  Vitals and nursing note reviewed.   Constitutional:       Appearance: Normal appearance.   HENT:      Right Ear: External ear normal.      Left Ear: External ear normal.      Mouth/Throat:      Mouth: Mucous membranes are moist.      Pharynx: No  posterior oropharyngeal erythema.   Eyes:      Extraocular Movements: Extraocular movements intact.      Conjunctiva/sclera: Conjunctivae normal.   Cardiovascular:      Rate and Rhythm: Regular rhythm. Tachycardia present.   Pulmonary:      Effort: No respiratory distress.      Breath sounds: Rhonchi present. No wheezing.   Abdominal:      General: There is distension.   Musculoskeletal:      Right lower leg: Edema (2+) present.      Left lower leg: Edema (2+) present.   Skin:     General: Skin is warm.      Findings: No rash.   Neurological:      Mental Status: He is alert and oriented to person, place, and time. Mental status is at baseline.       Significant Labs: CMP   Recent Labs   Lab 07/11/24  0359 07/11/24  1323 07/12/24  0337    136 137   K 3.7 4.0 3.5   CL 90* 92* 92*   CO2 40* 39* 36*    130* 118*   BUN 78* 77* 80*   CREATININE 2.0* 2.0* 2.1*   CALCIUM 9.5 9.3 9.4   PROT 7.4  --  7.2   ALBUMIN 2.6*  --  2.5*   BILITOT 1.1*  --  0.9   ALKPHOS 123  --  128   AST 21  --  19   ALT 11  --  9*   ANIONGAP 8 5* 9   , CBC   Recent Labs   Lab 07/11/24  0359 07/12/24  0337   WBC 5.58 5.84   HGB 13.1* 12.8*   HCT 48.8 46.5    165   , and All pertinent lab results from the last 24 hours have been reviewed.

## 2024-07-12 NOTE — ASSESSMENT & PLAN NOTE
Acute on chronic.  NYHA class III symptoms with recurrent admissions.  He was seen by [facility] outpatient 6/2024 who state patient was feeling better on new diuretic regimen however worried about the frequency of use of metolazone and his worsening kidney function.       CXR with cardiomegaly and pulmonary vascular congestion, suggestive of pulmonary edema secondary to CHF. BNP elevated at 800. Creatinine worse at 3.1 on admission.     Given his recurrent HF admissions and most recent hemodynamics, [facility] believes his overall prognosis is poor and recommended palliative care consult. Inpatient consult has been placed.     2 gram sodium restriction and 1500cc fluid restriction.  Encourage physical activity with graded exercise program.    Hemodynamics 7/11 AM:  CVP 17, SvO2 54, CO 5.3, CI 2.4,     -- Placed central line for CVP monitoring  -- Continue lasix gtt at 30  -- Add acetazolamide  -- Start dobutamine gtt 7/12  -- Bidil stopped due to hypertension  -- Monitor lytes BID; replete as indicated

## 2024-07-12 NOTE — ASSESSMENT & PLAN NOTE
BIPAP QHS; HFNC during the day; wean as tolerated.  - Intermittent BiPap during the day due to hypercapnia

## 2024-07-12 NOTE — ASSESSMENT & PLAN NOTE
On coumadin.    Lab Results   Component Value Date    INR 1.7 (H) 07/12/2024    INR 1.5 (H) 07/11/2024    INR 1.4 (H) 07/10/2024     -- Goal INR 2-3  -- Titrate warfarin daily

## 2024-07-12 NOTE — PLAN OF CARE
Cardiac ICU Care Plan    POC reviewed with Yong Mcintyre as well as his sister Paula over the phone today. Questions and concerns addressed. No acute events today. Pt remains on AIRVO while awake and BiPAP while sleeping; O2Sat goal >=88%.  Lasix gtt continues to infuse and additional Diamox administered for diuresis.  Electrolytes replaced as needed.   initiated d/t increasing CVPs despite diuresis.  Palliative care following d/t lack of advanced options.  Plan to continue with symptom management of pHTN and HF.  Pt progressing toward goals. See below and flowsheets for full assessment and VS info.     Neuro:  [place] Coma Scale  Best Eye Response: 4-->(E4) spontaneous  Best Motor Response: 6-->(M6) obeys commands  Best Verbal Response: 5-->(V5) oriented  Geoff Coma Scale Score: 15  Assessment Qualifiers: patient not sedated/intubated, no eye obstruction present  Pupil PERRLA: yes    24 hr Temp:  [98.2 °F (36.8 °C)-98.8 °F (37.1 °C)]      CV:  Rhythm: sinus tachycardia   DVT prophylaxis: VTE Required Core Measure: Pharmacological prophylaxis initiated/maintained (Coumadin)    CVP (mean): 17 mmHg (07/12/24 0730)  CVP (mean): 23 mmHg (07/12/24 1130)  CVP (mean): 22 mmHg (07/12/24 1505)    SVO2 (%): 61 % (07/12/24 0905)    Pulses  Right Radial Pulse: 2+ (normal)  Left Radial Pulse: 2+ (normal)  Right Dorsalis Pedis Pulse: 2+ (normal)  Left Dorsalis Pedis Pulse: 2+ (normal)  Right Posterior Tibial Pulse: 1+ (weak)  Left Posterior Tibial Pulse: 1+ (weak)    Resp:  AIRVO while awake and BiPAP while sleeping.  Pt placed on BiPAP for additional time today d/t increased CO2  Flow (L/min) (Oxygen Therapy): 45  Oxygen Concentration (%): 75    GI/:  GI prophylaxis: Protonix PO Daily  Diet/Nutrition Received: 2 gram sodium  Last Bowel Movement: 07/11/24  Voiding Characteristics: voids spontaneously without difficulty via urinal    Intake/Output Summary (Last 24 hours) at 7/12/2024 1806  Last data filed at 7/12/2024  "1735  Gross per 24 hour   Intake 1709.26 ml   Output 2625 ml   Net -915.74 ml     Labs/Accuchecks:  Recent Labs   Lab 07/10/24  0240 07/11/24 0359 07/12/24  0337   WBC 6.29 5.58 5.84   RBC 5.51 5.41 5.30   HGB 13.2* 13.1* 12.8*   HCT 47.9 48.8 46.5    166 165      Recent Labs   Lab 07/10/24  0240 07/11/24 0359 07/12/24  0337   INR 1.4* 1.5* 1.7*      Recent Labs     07/12/24  0337      K 3.5   CO2 36*   CL 92*   BUN 80*   CREATININE 2.1*   ALKPHOS 128   ALT 9*   AST 19   BILITOT 0.9     No results for input(s): "CPK", "CPKMB", "MB", "TROPONINI" in the last 168 hours.   Recent Labs     07/10/24  0739 07/10/24  2011 07/11/24  0823 07/11/24  1951 07/12/24  0908   PH 7.300* 7.306*  --   --  7.298*   PCO2 92.8* 77.3*  --   --  86.8*   PO2 47 33* 37* 34* 38*   HCO3 45.6* 38.5*  --   --  42.5*   POCSATURATED 74 54 62 56 61   BE 19* 12*  --   --  16*       Electrolytes: Electrolytes replaced  Accuchecks: none    Gtts/LDAs:   DOBUTamine IV infusion (non-titrating)  5 mcg/kg/min Intravenous Continuous 7.8 mL/hr at 07/12/24 1305 5 mcg/kg/min at 07/12/24 1305    furosemide (Lasix) 500 mg in 50 mL infusion (conc: 10 mg/mL)  30 mg/hr Intravenous Continuous 3 mL/hr at 07/12/24 1306 30 mg/hr at 07/12/24 1306       Lines/Drains/Airways       Central Venous Catheter Line  Duration             Percutaneous Central Line - Triple Lumen  07/08/24 1145 Internal Jugular Right 4 days              Peripheral Intravenous Line  Duration                  Peripheral IV - Single Lumen 07/12/24 1258 18 G 1 1/4 in No Distal;Left;Posterior Forearm <1 day                  Skin/Wounds  Bathing/Skin Care: bath, complete;dressed/undressed;hair washed;foot care;moisturizer applied (07/12/24 1030)  Generalized skin remains CDI, but edematous.  ACE bandages utilized for compression therapy to BLEs.  Wound care performed to RLE as ordered.  Pressure reduction techniques in use.     "

## 2024-07-12 NOTE — PROGRESS NOTES
[facility] - Cardiac Intensive Care  Cardiology  Progress Note    Patient Name: Yong Mcintyre  MRN: [id number]  Admission Date: 7/4/2024  Hospital Length of Stay: 8 days  Code Status: Full Code   Attending Physician: Rossy Leary MD   Primary Care Physician: Gianni Escalona MD  Expected Discharge Date: 7/16/2024  Principal Problem:Right ventricular dysfunction    Subjective:     Hospital Course:   The patient was admitted to the CCU for further management of right-sided heart failure. He was diuresed with a lasix gtt. Electrolytes were monitored and repleted as indicated. Palliative care was consulted given his poor prognosis. The patient had significant O2 requirements on admission which were slow to wean. A central line was placed for close CVP monitoring in the setting of aggressive diuresis. Nutrition consult placed for low salt diet education. Acetazolamide was added to his diuretic regimen due to persistent metabolic alkalosis. The patient required intermittent Bipap due to hypercapnic respiratory failure. Dobutamine was added to augment cardiac output to further diurese patient.    Interval History: No acute overnight events. Patient lethargic this morning. Will plan for more Bipap during the day due to hypercapnic respiratory failure. Will start dobutamine, continue diuresis.    Review of Systems   Constitutional: Positive for weight gain. Negative for fever.   HENT:  Negative for congestion.    Cardiovascular:  Negative for leg swelling, near-syncope, palpitations and syncope.   Respiratory:  Positive for shortness of breath.    Gastrointestinal:  Negative for abdominal pain and heartburn.   Genitourinary:  Negative for dysuria and hematuria.   Psychiatric/Behavioral:  The patient is not nervous/anxious.    All other systems reviewed and are negative.    Objective:     Vital Signs (Most Recent):  Temp: 97.7 °F (36.5 °C) (07/12/24 1505)  Pulse: 109 (07/12/24 1605)  Resp: 13 (07/12/24 1605)  BP: (!)  111/59 (07/12/24 1605)  SpO2: 97 % (07/12/24 1605) Vital Signs (24h Range):  Temp:  [97.7 °F (36.5 °C)-98.7 °F (37.1 °C)] 97.7 °F (36.5 °C)  Pulse:  [103-118] 109  Resp:  [11-39] 13  SpO2:  [84 %-100 %] 97 %  BP: ()/(47-76) 111/59     Weight: 104.5 kg (230 lb 6.1 oz)  Body mass index is 38.34 kg/m².     SpO2: 97 %         Intake/Output Summary (Last 24 hours) at 7/12/2024 1638  Last data filed at 7/12/2024 1540  Gross per 24 hour   Intake 1693.58 ml   Output 2300 ml   Net -606.42 ml       Lines/Drains/Airways       Central Venous Catheter Line  Duration             Percutaneous Central Line - Triple Lumen  07/08/24 1145 Internal Jugular Right 4 days              Peripheral Intravenous Line  Duration                  Peripheral IV - Single Lumen 07/12/24 1258 18 G 1 1/4 in No Distal;Left;Posterior Forearm <1 day                       Physical Exam  Vitals and nursing note reviewed.   Constitutional:       Appearance: Normal appearance.   HENT:      Right Ear: External ear normal.      Left Ear: External ear normal.      Mouth/Throat:      Mouth: Mucous membranes are moist.      Pharynx: No posterior oropharyngeal erythema.   Eyes:      Extraocular Movements: Extraocular movements intact.      Conjunctiva/sclera: Conjunctivae normal.   Cardiovascular:      Rate and Rhythm: Regular rhythm. Tachycardia present.   Pulmonary:      Effort: No respiratory distress.      Breath sounds: Rhonchi present. No wheezing.   Abdominal:      General: There is distension.   Musculoskeletal:      Right lower leg: Edema (2+) present.      Left lower leg: Edema (2+) present.   Skin:     General: Skin is warm.      Findings: No rash.   Neurological:      Mental Status: He is alert and oriented to person, place, and time. Mental status is at baseline.       Significant Labs: CMP   Recent Labs   Lab 07/11/24  0359 07/11/24  1323 07/12/24  0337    136 137   K 3.7 4.0 3.5   CL 90* 92* 92*   CO2 40* 39* 36*    130* 118*    BUN 78* 77* 80*   CREATININE 2.0* 2.0* 2.1*   CALCIUM 9.5 9.3 9.4   PROT 7.4  --  7.2   ALBUMIN 2.6*  --  2.5*   BILITOT 1.1*  --  0.9   ALKPHOS 123  --  128   AST 21  --  19   ALT 11  --  9*   ANIONGAP 8 5* 9   , CBC   Recent Labs   Lab 07/11/24  0359 07/12/24  0337   WBC 5.58 5.84   HGB 13.1* 12.8*   HCT 48.8 46.5    165   , and All pertinent lab results from the last 24 hours have been reviewed.    Assessment and Plan:     * Right ventricular dysfunction  Acute on chronic.  NYHA class III symptoms with recurrent admissions.  He was seen by [facility] outpatient 6/2024 who state patient was feeling better on new diuretic regimen however worried about the frequency of use of metolazone and his worsening kidney function.       CXR with cardiomegaly and pulmonary vascular congestion, suggestive of pulmonary edema secondary to CHF. BNP elevated at 800. Creatinine worse at 3.1 on admission.     Given his recurrent HF admissions and most recent hemodynamics, [facility] believes his overall prognosis is poor and recommended palliative care consult. Inpatient consult has been placed.     2 gram sodium restriction and 1500cc fluid restriction.  Encourage physical activity with graded exercise program.    Hemodynamics 7/11 AM:  CVP 17, SvO2 54, CO 5.3, CI 2.4,     -- Placed central line for CVP monitoring  -- Continue lasix gtt at 30  -- Add acetazolamide  -- Start dobutamine gtt 7/12  -- Bidil stopped due to hypertension  -- Monitor lytes BID; replete as indicated    Paroxysmal A-fib  On coumadin.    Lab Results   Component Value Date    INR 1.7 (H) 07/12/2024    INR 1.5 (H) 07/11/2024    INR 1.4 (H) 07/10/2024     -- Goal INR 2-3  -- Titrate warfarin daily      MALLIKA (obstructive sleep apnea)  See 'hypoventilation syndrome'    Pulmonary hypertension  WHO group 2-3 per his managing pulmonologist (Danyell at [facility])    Adherent with diet, CPAP, and on continuous O2 3L at home. Currently requiring 50L High flow 70% O2  In  spite of latter, he continues to be polycythemic.     -- RV dysfunction treatment as above    Hypoventilation syndrome  BIPAP QHS; HFNC during the day; wean as tolerated.  - Intermittent BiPap during the day due to hypercapnia        VTE Risk Mitigation (From admission, onward)           Ordered     warfarin (COUMADIN) tablet 5 mg  Daily         07/12/24 0751     warfarin tablet 7.5 mg  Daily         07/12/24 0751     IP VTE HIGH RISK PATIENT  Once         07/04/24 2302     Place sequential compression device  Until discontinued         07/04/24 2302                    Vik White MD  Cardiology  [facility] - Cardiac Intensive Care

## 2024-07-12 NOTE — PROGRESS NOTES
CVP increased from AM assessment. Pt also requiring increasing O2 support over the course of the morning.  Lasix gtt continues to infuse at 30mg/hr as ordered and pt has voided 550cc spontaneously via urinal so far today.  MD notified; stated we will be increasing diuresis. Awaiting further orders.        07/12/24 0730 07/12/24 1130   Invasive Hemodynamic Monitoring   CVP (mean) 17 mmHg 23 mmHg        07/12/24 0730   Vital Signs   SpO2 95 %   Pulse Oximetry Type Continuous   Flow (L/min) (Oxygen Therapy) 45   Oxygen Concentration (%) 70   Device (Oxygen Therapy) high flow nasal cannula w/device        07/12/24 1130   Vital Signs   SpO2 96 %   Pulse Oximetry Type Continuous   Flow (L/min) (Oxygen Therapy) 50   Oxygen Concentration (%) 90   Device (Oxygen Therapy) high flow nasal cannula w/device

## 2024-07-12 NOTE — PLAN OF CARE
CICU DAILY GOALS       A: Awake    RASS: Goal - RASS Goal: 0-->alert and calm  Actual - RASS (Carter Agitation-Sedation Scale): alert and calm   Restraint necessity:    B: Breath   SBT: Not intubated   C: Coordinate A & B, analgesics/sedatives   Pain: managed    SAT: Not intubated  D: Delirium   CAM-ICU: Overall CAM-ICU: Negative  E: Early(intubated/ Progressive (non-intubated) Mobility   MOVE Screen: Pass   Activity: Activity Management: Sitting at edge of bed - L2  FAS: Feeding/Nutrition   Diet order: Diet/Nutrition Received: low saturated fat/low cholesterol, 2 gram sodium,   Fluid restriction: Fluid Requirement: 1500 cc FR   Nutritional Supplement Intake: Quantity 0, Type: Boost  T: Thrombus   DVT prophylaxis: VTE Required Core Measure: Pharmacological prophylaxis initiated/maintained  H: HOB Elevation   Head of Bed (HOB) Positioning: HOB at 30-45 degrees  U: Ulcer Prophylaxis   GI: yes  G: Glucose control   managed      S: Skin   Nurses Note -- 4 Eyes  Skin assessed during: Q Shift Change   CHOOSE ONE:  [] No Altered Skin Integrity Present      []Prevention Measures Documented    [] Yes- Altered Skin Integrity Present or Discovered     [] LDA Added if Not in Epic (Describe Wound)     [] New Altered Skin Integrity was Present on Admit and Documented in LDA     [] Wound Image Taken  Wound Care Consulted? No  Attending Nurse:  Ilana Natarajan RN/Staff Member:  Yes    B: Bowel Function   no issues   I: Indwelling Catheters   [place] necessity:     CVC necessity: Yes   IPAD offered: No  D: De-escalation Antibx   No  Plan for the day   diuresis  Family/Goals of care/Code Status   Code Status: Full Code     No acute events throughout day, VS and assessment per flow sheet, patient progressing towards goals as tolerated, plan of care reviewed with Yong Mcintyre and family, all concerns addressed, will continue to monitor.

## 2024-07-13 PROBLEM — E87.3 METABOLIC ALKALOSIS: Status: ACTIVE | Noted: 2024-07-13

## 2024-07-13 PROBLEM — J96.21 ACUTE ON CHRONIC RESPIRATORY FAILURE WITH HYPOXIA: Status: ACTIVE | Noted: 2024-07-13

## 2024-07-13 PROBLEM — I50.813 ACUTE ON CHRONIC RIGHT-SIDED CONGESTIVE HEART FAILURE: Status: ACTIVE | Noted: 2024-07-13

## 2024-07-13 LAB
ALLENS TEST: ABNORMAL
ALLENS TEST: NORMAL
ALLENS TEST: NORMAL
ANION GAP SERPL CALC-SCNC: 10 MMOL/L (ref 8–16)
ANION GAP SERPL CALC-SCNC: 8 MMOL/L (ref 8–16)
ANION GAP SERPL CALC-SCNC: 8 MMOL/L (ref 8–16)
BASOPHILS # BLD AUTO: 0.03 K/UL (ref 0–0.2)
BASOPHILS NFR BLD: 0.5 % (ref 0–1.9)
BUN SERPL-MCNC: 66 MG/DL (ref 6–20)
BUN SERPL-MCNC: 73 MG/DL (ref 6–20)
BUN SERPL-MCNC: 78 MG/DL (ref 6–20)
CALCIUM SERPL-MCNC: 9.1 MG/DL (ref 8.7–10.5)
CALCIUM SERPL-MCNC: 9.3 MG/DL (ref 8.7–10.5)
CALCIUM SERPL-MCNC: 9.5 MG/DL (ref 8.7–10.5)
CHLORIDE SERPL-SCNC: 92 MMOL/L (ref 95–110)
CHLORIDE SERPL-SCNC: 94 MMOL/L (ref 95–110)
CHLORIDE SERPL-SCNC: 97 MMOL/L (ref 95–110)
CO2 SERPL-SCNC: 33 MMOL/L (ref 23–29)
CO2 SERPL-SCNC: 35 MMOL/L (ref 23–29)
CO2 SERPL-SCNC: 36 MMOL/L (ref 23–29)
CREAT SERPL-MCNC: 1.9 MG/DL (ref 0.5–1.4)
CREAT SERPL-MCNC: 1.9 MG/DL (ref 0.5–1.4)
CREAT SERPL-MCNC: 2.1 MG/DL (ref 0.5–1.4)
DELSYS: NORMAL
DELSYS: NORMAL
DIFFERENTIAL METHOD BLD: ABNORMAL
EOSINOPHIL # BLD AUTO: 0 K/UL (ref 0–0.5)
EOSINOPHIL NFR BLD: 0 % (ref 0–8)
EP: 11
EP: 11
ERYTHROCYTE [DISTWIDTH] IN BLOOD BY AUTOMATED COUNT: 21.8 % (ref 11.5–14.5)
ERYTHROCYTE [SEDIMENTATION RATE] IN BLOOD BY WESTERGREN METHOD: 26 MM/H
ERYTHROCYTE [SEDIMENTATION RATE] IN BLOOD BY WESTERGREN METHOD: 26 MM/H
EST. GFR  (NO RACE VARIABLE): 38.1 ML/MIN/1.73 M^2
EST. GFR  (NO RACE VARIABLE): 43 ML/MIN/1.73 M^2
EST. GFR  (NO RACE VARIABLE): 43 ML/MIN/1.73 M^2
FIO2: 65
FIO2: 75
GLUCOSE SERPL-MCNC: 108 MG/DL (ref 70–110)
GLUCOSE SERPL-MCNC: 116 MG/DL (ref 70–110)
GLUCOSE SERPL-MCNC: 124 MG/DL (ref 70–110)
HCO3 UR-SCNC: 39.2 MMOL/L (ref 24–28)
HCT VFR BLD AUTO: 47.4 % (ref 40–54)
HGB BLD-MCNC: 12.7 G/DL (ref 14–18)
IMM GRANULOCYTES # BLD AUTO: 0.01 K/UL (ref 0–0.04)
IMM GRANULOCYTES NFR BLD AUTO: 0.2 % (ref 0–0.5)
INR PPP: 1.9 (ref 0.8–1.2)
IP: 18
IP: 18
LDH SERPL L TO P-CCNC: 0.66 MMOL/L (ref 0.5–2.2)
LYMPHOCYTES # BLD AUTO: 0.6 K/UL (ref 1–4.8)
LYMPHOCYTES NFR BLD: 10.6 % (ref 18–48)
MAGNESIUM SERPL-MCNC: 1.9 MG/DL (ref 1.6–2.6)
MAGNESIUM SERPL-MCNC: 2.1 MG/DL (ref 1.6–2.6)
MCH RBC QN AUTO: 23.7 PG (ref 27–31)
MCHC RBC AUTO-ENTMCNC: 26.8 G/DL (ref 32–36)
MCV RBC AUTO: 89 FL (ref 82–98)
MODE: NORMAL
MODE: NORMAL
MONOCYTES # BLD AUTO: 0.5 K/UL (ref 0.3–1)
MONOCYTES NFR BLD: 8 % (ref 4–15)
NEUTROPHILS # BLD AUTO: 4.7 K/UL (ref 1.8–7.7)
NEUTROPHILS NFR BLD: 80.7 % (ref 38–73)
NRBC BLD-RTO: 0 /100 WBC
PCO2 BLDA: 80.8 MMHG (ref 35–45)
PH SMN: 7.29 [PH] (ref 7.35–7.45)
PHOSPHATE SERPL-MCNC: 4.8 MG/DL (ref 2.7–4.5)
PLATELET # BLD AUTO: 161 K/UL (ref 150–450)
PMV BLD AUTO: 10.3 FL (ref 9.2–12.9)
PO2 BLDA: 41 MMHG (ref 40–60)
PO2 BLDA: 46 MMHG (ref 40–60)
POC BE: 13 MMOL/L
POC SATURATED O2: 67 % (ref 95–100)
POC SATURATED O2: 74 % (ref 95–100)
POC TCO2: 42 MMOL/L (ref 24–29)
POTASSIUM SERPL-SCNC: 3.4 MMOL/L (ref 3.5–5.1)
POTASSIUM SERPL-SCNC: 4 MMOL/L (ref 3.5–5.1)
POTASSIUM SERPL-SCNC: 4.2 MMOL/L (ref 3.5–5.1)
PROTHROMBIN TIME: 20.2 SEC (ref 9–12.5)
RBC # BLD AUTO: 5.35 M/UL (ref 4.6–6.2)
SAMPLE: ABNORMAL
SAMPLE: NORMAL
SAMPLE: NORMAL
SITE: ABNORMAL
SITE: NORMAL
SITE: NORMAL
SODIUM SERPL-SCNC: 137 MMOL/L (ref 136–145)
SODIUM SERPL-SCNC: 138 MMOL/L (ref 136–145)
SODIUM SERPL-SCNC: 138 MMOL/L (ref 136–145)
WBC # BLD AUTO: 5.78 K/UL (ref 3.9–12.7)

## 2024-07-13 PROCEDURE — 25000003 PHARM REV CODE 250: Performed by: INTERNAL MEDICINE

## 2024-07-13 PROCEDURE — 27100171 HC OXYGEN HIGH FLOW UP TO 24 HOURS

## 2024-07-13 PROCEDURE — 94660 CPAP INITIATION&MGMT: CPT

## 2024-07-13 PROCEDURE — 83605 ASSAY OF LACTIC ACID: CPT

## 2024-07-13 PROCEDURE — 25000003 PHARM REV CODE 250

## 2024-07-13 PROCEDURE — 25000003 PHARM REV CODE 250: Performed by: STUDENT IN AN ORGANIZED HEALTH CARE EDUCATION/TRAINING PROGRAM

## 2024-07-13 PROCEDURE — 20000000 HC ICU ROOM

## 2024-07-13 PROCEDURE — 85610 PROTHROMBIN TIME: CPT | Performed by: INTERNAL MEDICINE

## 2024-07-13 PROCEDURE — 63600175 PHARM REV CODE 636 W HCPCS

## 2024-07-13 PROCEDURE — 94640 AIRWAY INHALATION TREATMENT: CPT

## 2024-07-13 PROCEDURE — 80048 BASIC METABOLIC PNL TOTAL CA: CPT | Performed by: INTERNAL MEDICINE

## 2024-07-13 PROCEDURE — 99900035 HC TECH TIME PER 15 MIN (STAT)

## 2024-07-13 PROCEDURE — 99231 SBSQ HOSP IP/OBS SF/LOW 25: CPT | Mod: GC,,, | Performed by: INTERNAL MEDICINE

## 2024-07-13 PROCEDURE — 82803 BLOOD GASES ANY COMBINATION: CPT

## 2024-07-13 PROCEDURE — 94761 N-INVAS EAR/PLS OXIMETRY MLT: CPT

## 2024-07-13 PROCEDURE — 80048 BASIC METABOLIC PNL TOTAL CA: CPT | Mod: 91

## 2024-07-13 PROCEDURE — 94799 UNLISTED PULMONARY SVC/PX: CPT

## 2024-07-13 PROCEDURE — 63600175 PHARM REV CODE 636 W HCPCS: Performed by: STUDENT IN AN ORGANIZED HEALTH CARE EDUCATION/TRAINING PROGRAM

## 2024-07-13 PROCEDURE — 84100 ASSAY OF PHOSPHORUS: CPT | Performed by: INTERNAL MEDICINE

## 2024-07-13 PROCEDURE — 85025 COMPLETE CBC W/AUTO DIFF WBC: CPT | Performed by: INTERNAL MEDICINE

## 2024-07-13 PROCEDURE — 83735 ASSAY OF MAGNESIUM: CPT | Performed by: INTERNAL MEDICINE

## 2024-07-13 RX ORDER — POTASSIUM CHLORIDE 20 MEQ/1
40 TABLET, EXTENDED RELEASE ORAL ONCE
Status: COMPLETED | OUTPATIENT
Start: 2024-07-13 | End: 2024-07-13

## 2024-07-13 RX ORDER — FUROSEMIDE 10 MG/ML
80 INJECTION INTRAMUSCULAR; INTRAVENOUS ONCE
Status: COMPLETED | OUTPATIENT
Start: 2024-07-13 | End: 2024-07-13

## 2024-07-13 RX ORDER — MUPIROCIN 20 MG/G
OINTMENT TOPICAL 2 TIMES DAILY
Status: DISPENSED | OUTPATIENT
Start: 2024-07-13 | End: 2024-07-18

## 2024-07-13 RX ORDER — ACETAZOLAMIDE 250 MG/1
250 TABLET ORAL 2 TIMES DAILY
Status: COMPLETED | OUTPATIENT
Start: 2024-07-13 | End: 2024-07-14

## 2024-07-13 RX ORDER — POTASSIUM CHLORIDE 29.8 MG/ML
40 INJECTION INTRAVENOUS ONCE
Status: COMPLETED | OUTPATIENT
Start: 2024-07-13 | End: 2024-07-13

## 2024-07-13 RX ADMIN — DOBUTAMINE HYDROCHLORIDE 5 MCG/KG/MIN: 400 INJECTION INTRAVENOUS at 11:07

## 2024-07-13 RX ADMIN — PANTOPRAZOLE SODIUM 40 MG: 40 TABLET, DELAYED RELEASE ORAL at 07:07

## 2024-07-13 RX ADMIN — ACETAZOLAMIDE 250 MG: 250 TABLET ORAL at 01:07

## 2024-07-13 RX ADMIN — POTASSIUM CHLORIDE 40 MEQ: 1500 TABLET, EXTENDED RELEASE ORAL at 05:07

## 2024-07-13 RX ADMIN — MUPIROCIN: 20 OINTMENT TOPICAL at 09:07

## 2024-07-13 RX ADMIN — DEXTROSE 500 MG: 50 INJECTION, SOLUTION INTRAVENOUS at 12:07

## 2024-07-13 RX ADMIN — CHLOROTHIAZIDE SODIUM 500 MG: 500 INJECTION, POWDER, LYOPHILIZED, FOR SOLUTION INTRAVENOUS at 03:07

## 2024-07-13 RX ADMIN — ACETAZOLAMIDE 250 MG: 250 TABLET ORAL at 09:07

## 2024-07-13 RX ADMIN — FUROSEMIDE 30 MG/HR: 10 INJECTION, SOLUTION INTRAMUSCULAR; INTRAVENOUS at 02:07

## 2024-07-13 RX ADMIN — FUROSEMIDE 40 MG/HR: 10 INJECTION, SOLUTION INTRAMUSCULAR; INTRAVENOUS at 11:07

## 2024-07-13 RX ADMIN — ALLOPURINOL 300 MG: 100 TABLET ORAL at 07:07

## 2024-07-13 RX ADMIN — TIOTROPIUM BROMIDE INHALATION SPRAY 2 PUFF: 3.12 SPRAY, METERED RESPIRATORY (INHALATION) at 07:07

## 2024-07-13 RX ADMIN — FLUTICASONE PROPIONATE AND SALMETEROL 1 PUFF: 50; 250 POWDER RESPIRATORY (INHALATION) at 08:07

## 2024-07-13 RX ADMIN — FUROSEMIDE 80 MG: 10 INJECTION, SOLUTION INTRAVENOUS at 11:07

## 2024-07-13 RX ADMIN — POTASSIUM CHLORIDE 40 MEQ: 29.8 INJECTION, SOLUTION INTRAVENOUS at 07:07

## 2024-07-13 RX ADMIN — WARFARIN SODIUM 5 MG: 5 TABLET ORAL at 04:07

## 2024-07-13 NOTE — ASSESSMENT & PLAN NOTE
WHO group 2-3 per his managing pulmonologist (Danyell at [facility])  Adherent with diet, CPAP, and on continuous O2 3L at home.     -- RV dysfunction treatment as above

## 2024-07-13 NOTE — ASSESSMENT & PLAN NOTE
Patient with Hypercapnic and Hypoxic Respiratory failure which is Acute on chronic.  he is on home oxygen at 3 LPM. Supplemental oxygen was provided and noted- Oxygen Concentration (%):  [70-84] 75    Signs/symptoms of respiratory failure include- tachypnea and increased work of breathing. Contributing diagnoses includes - CHF Labs and images were reviewed. Patient Has recent ABG, which has been reviewed.    -- Trend VBG BID  -- BiPap during day as tolerated; okay for HFNC w/ meals  -- Goal SpO2 > 88%

## 2024-07-13 NOTE — ASSESSMENT & PLAN NOTE
BIPAP QHS; HFNC during meals.  Significant hypercapnia w/ pCO2 80-90; will start daytime BiPap    -- BiPap as tolerated throughout the day; okay for HFNC during meals only

## 2024-07-13 NOTE — PROGRESS NOTES
[facility]iac [facility]  Cardiology  Progress Note    Patient Name: Yong Mcintyre  MRN: [id number]  Admission Date: 7/4/2024  Hospital Length of Stay: 9 days  Code Status: Full Code   Attending Physician: Rossy Leary MD   Primary Care Physician: Gianni Escalona MD  Expected Discharge Date: 7/16/2024  Principal Problem:Right ventricular dysfunction    Subjective:     Hospital Course:   The patient was admitted to the CCU for further management of right-sided heart failure. He was diuresed with a lasix gtt. Electrolytes were monitored and repleted as indicated. Palliative care was consulted given his poor prognosis. The patient had significant O2 requirements on admission which were slow to wean. A central line was placed for close CVP monitoring in the setting of aggressive diuresis. Nutrition consult placed for low salt diet education. Acetazolamide was added to his diuretic regimen due to persistent metabolic alkalosis. The patient required intermittent Bipap due to hypercapnic respiratory failure. Dobutamine was added to augment cardiac output to further diurese patient.    Interval History: No acute events overnight. Mentation improved today. pCO2 on VBG remains elevated, will continue BiPap throughout the day as tolerated. Will increase diuretic regimen with IV 80 mg lasix push w/ increase of lasix gtt to 40. Consider additional Diuril if additional diuresis needed this afternoon.    Review of Systems   Constitutional: Positive for weight gain. Negative for fever.   HENT:  Negative for congestion.    Cardiovascular:  Negative for leg swelling, near-syncope, palpitations and syncope.   Respiratory:  Positive for shortness of breath.    Gastrointestinal:  Negative for abdominal pain and heartburn.   Genitourinary:  Negative for dysuria and hematuria.   Psychiatric/Behavioral:  The patient is not nervous/anxious.    All other systems reviewed and are negative.    Objective:     Vital Signs  (Most Recent):  Temp: 97.8 °F (36.6 °C) (07/13/24 1101)  Pulse: (!) 113 (07/13/24 1201)  Resp: 14 (07/13/24 1201)  BP: 97/64 (07/13/24 1201)  SpO2: 99 % (07/13/24 1222) Vital Signs (24h Range):  Temp:  [97 °F (36.1 °C)-98.2 °F (36.8 °C)] 97.8 °F (36.6 °C)  Pulse:  [105-124] 113  Resp:  [12-33] 14  SpO2:  [90 %-100 %] 99 %  BP: ()/(43-70) 97/64     Weight: 104.5 kg (230 lb 6.1 oz)  Body mass index is 38.34 kg/m².     SpO2: 99 %         Intake/Output Summary (Last 24 hours) at 7/13/2024 1357  Last data filed at 7/13/2024 1234  Gross per 24 hour   Intake 1479.22 ml   Output 2860 ml   Net -1380.78 ml       Lines/Drains/Airways       Central Venous Catheter Line  Duration             Percutaneous Central Line - Triple Lumen  07/08/24 1145 Internal Jugular Right 5 days              Peripheral Intravenous Line  Duration                  Peripheral IV - Single Lumen 07/12/24 1258 18 G 1 1/4 in No Distal;Left;Posterior Forearm 1 day                       Physical Exam  Vitals and nursing note reviewed.   Constitutional:       Appearance: Normal appearance.   HENT:      Right Ear: External ear normal.      Left Ear: External ear normal.      Mouth/Throat:      Mouth: Mucous membranes are moist.      Pharynx: No posterior oropharyngeal erythema.   Eyes:      Extraocular Movements: Extraocular movements intact.      Conjunctiva/sclera: Conjunctivae normal.   Cardiovascular:      Rate and Rhythm: Regular rhythm. Tachycardia present.   Pulmonary:      Effort: No respiratory distress.      Breath sounds: Rhonchi present. No wheezing.   Abdominal:      General: There is distension.   Musculoskeletal:      Right lower leg: Edema (2+) present.      Left lower leg: Edema (2+) present.   Skin:     General: Skin is warm.      Findings: No rash.   Neurological:      Mental Status: He is alert and oriented to person, place, and time. Mental status is at baseline.         [facility]   Recent Labs   Lab 07/12/24  6559  07/12/24  1507 07/13/24  0439 07/13/24  1155    137 138 138   K 3.5 4.0 3.4* 4.2   CL 92* 92* 94* 97   CO2 36* 35* 36* 33*   * 116* 124* 108   BUN 80* 78* 73* 66*   CREATININE 2.1* 2.1* 1.9* 1.9*   CALCIUM 9.4 9.1 9.3 9.5   PROT 7.2  --   --   --    ALBUMIN 2.5*  --   --   --    BILITOT 0.9  --   --   --    ALKPHOS 128  --   --   --    AST 19  --   --   --    ALT 9*  --   --   --    ANIONGAP 9 10 8 8    and CBC   Recent Labs   Lab 07/12/24  0337 07/13/24  0439   WBC 5.84 5.78   HGB 12.8* 12.7*   HCT 46.5 47.4    161     Assessment and Plan:     * Right ventricular dysfunction  Acute on chronic.  NYHA class III symptoms with recurrent admissions.  He was seen by [facility] outpatient 6/2024 who state patient was feeling better on new diuretic regimen however worried about the frequency of use of metolazone and his worsening kidney function.       CXR with cardiomegaly and pulmonary vascular congestion, suggestive of pulmonary edema secondary to CHF. BNP elevated at 800. Creatinine worse at 3.1 on admission.     Given his recurrent HF admissions and most recent hemodynamics, [facility] believes his overall prognosis is poor and recommended palliative care consult. Palliative care evaluated patient. Patient wishes to continue full measures at this time.     2 gram sodium restriction and 1500cc fluid restriction.  Encourage physical activity with graded exercise program.    Hemodynamics 7/13 AM:  CVP 16, SvO2 74, CO 10.7, CI 4.9,     -- Placed central line for CVP monitoring  -- Increase lasix gtt to 40  -- Continue Acetazolamide PO  -- Continue dobutamine gtt 7/12  -- Consider Diuril as needed  -- Monitor lytes BID; replete as indicated    Metabolic alkalosis  Bicarb 40's in setting of aggressive diuresis. Suspect contraction alkalosis.    -- Continue diamox  -- Trend Bicarb    Acute on chronic respiratory failure with hypoxia  Patient with Hypercapnic and Hypoxic Respiratory failure which is Acute on  chronic.  he is on home oxygen at 3 LPM. Supplemental oxygen was provided and noted- Oxygen Concentration (%):  [70-84] 75    Signs/symptoms of respiratory failure include- tachypnea and increased work of breathing. Contributing diagnoses includes - CHF Labs and images were reviewed. Patient Has recent ABG, which has been reviewed.    -- Trend VBG BID  -- BiPap during day as tolerated; okay for HFNC w/ meals  -- Goal SpO2 > 88%    Acute on chronic right-sided congestive heart failure  Results for orders placed during the hospital encounter of 07/04/24  Echo  Interpretation Summary    Left Ventricle: Mild global hypokinesis present. Septal flattening in diastole and systole consistent with right ventricular volume and pressure overload. There is moderately reduced systolic function with a visually estimated ejection fraction of 35 - 40%. Grade I diastolic dysfunction. Eatimated CO is 3.4 l/min    Right Ventricle: Severe right ventricular enlargement. Wall thickness is normal. Right ventricle wall motion has global hypokinesis. Systolic function is severely reduced.    Right Atrium: Right atrium is severely dilated.    Aortic Valve: The aortic valve is a trileaflet valve. There is mild aortic valve sclerosis. There is moderate annular calcification present.    Tricuspid Valve: There is moderate regurgitation with an anteromedial eccentrically directed jet.    Aorta: Aortic root is normal in size measuring 2.77 cm. Ascending aorta is normal measuring 2.51 cm.    Pulmonary Artery: There is severe pulmonary hypertension. The estimated pulmonary artery systolic pressure is 81 mmHg.    IVC/SVC: Elevated venous pressure at 15 mmHg.      -- See 'RV dysfunction'    Paroxysmal A-fib  On coumadin.    Lab Results   Component Value Date    INR 1.9 (H) 07/13/2024    INR 1.7 (H) 07/12/2024    INR 1.5 (H) 07/11/2024     -- Goal INR 2-3  -- Titrate warfarin daily      MALLIKA (obstructive sleep apnea)  See 'hypoventilation  syndrome'    Pulmonary hypertension  WHO group 2-3 per his managing pulmonologist (Danyell at [facility])  Adherent with diet, CPAP, and on continuous O2 3L at home.     -- RV dysfunction treatment as above    Hypoventilation syndrome  BIPAP QHS; HFNC during meals.  Significant hypercapnia w/ pCO2 80-90; will start daytime BiPap    -- BiPap as tolerated throughout the day; okay for HFNC during meals only        VTE Risk Mitigation (From admission, onward)           Ordered     warfarin (COUMADIN) tablet 5 mg  Daily         07/12/24 0751     IP VTE HIGH RISK PATIENT  Once         07/04/24 2302     Place sequential compression device  Until discontinued         07/04/24 2302                    Vik White MD  Cardiology  [facility]

## 2024-07-13 NOTE — PLAN OF CARE
[facility]  ICU Shift Summary  Date: 7/13/2024      Prehospitalization: Home  Admit Date / LOS : 7/4/2024/ 9 days    Diagnosis: Right ventricular dysfunction    Consults:        Active: Cardio and Palliative       Needed: N/A     Code Status: Full Code   Advanced Directive: <no information>    LDA:  Lines/Drains/Airways       Central Venous Catheter Line  Duration             Percutaneous Central Line - Triple Lumen  07/08/24 1145 Internal Jugular Right 5 days              Peripheral Intravenous Line  Duration                  Peripheral IV - Single Lumen 07/12/24 1258 18 G 1 1/4 in No Distal;Left;Posterior Forearm 1 day                  Central Lines/Site/Justification: CVP monitoring  Urinary Cath/Order/Justification:Patient Does Not Have Urinary Catheter    Vasopressors/Infusions:    DOBUTamine IV infusion (non-titrating)  5 mcg/kg/min Intravenous Continuous 7.8 mL/hr at 07/13/24 1601 5 mcg/kg/min at 07/13/24 1601    furosemide (Lasix) 500 mg in 50 mL infusion (conc: 10 mg/mL)  40 mg/hr Intravenous Continuous 4 mL/hr at 07/13/24 1601 40 mg/hr at 07/13/24 1601          GOALS: Volume/ Hemodynamic: MAP > 65                     RASS: 0  alert and calm    Pain Management: none       Pain Controlled: yes     Rhythm: ST    Respiratory Device: Bipap    Oxygen Concentration (%):  [55-84] 55                 Most Recent SBT/ SAT: N/A       MOVE Screen: FAIL  ICU Liberation: no    VTE Prophylaxis: Pharm  Mobility: OOB to Chair  Stress Ulcer Prophylaxis: Yes    Isolation: No active isolations    Dietary:   Current Diet Order   Procedures    Diet Low Sodium, 2gm Fluid - 1500mL     Order Specific Question:   Fluid restriction:     Answer:   Fluid - 1500mL      Tolerance: yes  Advancement: @ goal    I & O (24h):    Intake/Output Summary (Last 24 hours) at 7/13/2024 1613  Last data filed at 7/13/2024 1603  Gross per 24 hour   Intake 1629.91 ml   Output 2885 ml   Net -1255.09 ml        Restraints:  "No    Significant Dates:  Post Op Date: N/A  Rescue Date: N/A  Imaging/ Diagnostics: N/A    Noteworthy Labs:  none    COVID Test:   CBC/Anemia Labs: Coags:    Recent Labs   Lab 07/12/24 0337 07/13/24  0439   WBC 5.84 5.78   HGB 12.8* 12.7*   HCT 46.5 47.4    161   MCV 88 89   RDW 22.2* 21.8*    Recent Labs   Lab 07/12/24 0337 07/13/24  0439   INR 1.7* 1.9*        Chemistries:   Recent Labs   Lab 07/11/24  0359 07/11/24  1323 07/12/24  0337 07/12/24  1242 07/12/24  1507 07/13/24  0439 07/13/24  1155      < > 137  --  137 138 138   K 3.7   < > 3.5  --  4.0 3.4* 4.2   CL 90*   < > 92*  --  92* 94* 97   CO2 40*   < > 36*  --  35* 36* 33*   BUN 78*   < > 80*  --  78* 73* 66*   CREATININE 2.0*   < > 2.1*  --  2.1* 1.9* 1.9*   CALCIUM 9.5   < > 9.4  --  9.1 9.3 9.5   PROT 7.4  --  7.2  --   --   --   --    BILITOT 1.1*  --  0.9  --   --   --   --    ALKPHOS 123  --  128  --   --   --   --    ALT 11  --  9*  --   --   --   --    AST 21  --  19  --   --   --   --    MG 2.1  --  1.8   < > 2.1 1.9  --    PHOS 4.6*  --  4.6*  --   --  4.8*  --     < > = values in this interval not displayed.        Cardiac Enzymes: Ejection Fractions:    No results for input(s): "CPK", "CPKMB", "MB", "TROPONINI" in the last 72 hours. EF   Date Value Ref Range Status   03/17/2024 60 % Final        POCT Glucose: HbA1c:    No results for input(s): "POCTGLUCOSE" in the last 168 hours. Hemoglobin A1C   Date Value Ref Range Status   01/19/2024 5.8 (H) 4.0 - 5.6 % Final     Comment:     ADA Screening Guidelines:  5.7-6.4%  Consistent with prediabetes  >or=6.5%  Consistent with diabetes    High levels of fetal hemoglobin interfere with the HbA1C  assay. Heterozygous hemoglobin variants (HbS, HgC, etc)do  not significantly interfere with this assay.   However, presence of multiple variants may affect accuracy.             ICU LOS 8d 16h  Level of Care: Critical Care    Chart Check: 12 HR Done    Problem: Adult Inpatient Plan of " Care  Goal: Absence of Hospital-Acquired Illness or Injury  Outcome: Progressing  Goal: Optimal Comfort and Wellbeing  Outcome: Not Progressing  Goal: Readiness for Transition of Care  Outcome: Not Progressing

## 2024-07-13 NOTE — ASSESSMENT & PLAN NOTE
----- Message from MAIRA Yadav sent at 6/11/2021  7:17 AM CDT -----  MRSA swab negative.    Results for orders placed during the hospital encounter of 07/04/24  Echo  Interpretation Summary    Left Ventricle: Mild global hypokinesis present. Septal flattening in diastole and systole consistent with right ventricular volume and pressure overload. There is moderately reduced systolic function with a visually estimated ejection fraction of 35 - 40%. Grade I diastolic dysfunction. Eatimated CO is 3.4 l/min    Right Ventricle: Severe right ventricular enlargement. Wall thickness is normal. Right ventricle wall motion has global hypokinesis. Systolic function is severely reduced.    Right Atrium: Right atrium is severely dilated.    Aortic Valve: The aortic valve is a trileaflet valve. There is mild aortic valve sclerosis. There is moderate annular calcification present.    Tricuspid Valve: There is moderate regurgitation with an anteromedial eccentrically directed jet.    Aorta: Aortic root is normal in size measuring 2.77 cm. Ascending aorta is normal measuring 2.51 cm.    Pulmonary Artery: There is severe pulmonary hypertension. The estimated pulmonary artery systolic pressure is 81 mmHg.    IVC/SVC: Elevated venous pressure at 15 mmHg.      -- See 'RV dysfunction'

## 2024-07-13 NOTE — PLAN OF CARE
Problem: Wound  Goal: Optimal Coping  Intervention: Support Patient and Family Response  Flowsheets (Taken 7/13/2024 0414)  Supportive Measures: verbalization of feelings encouraged  Family/Support System Care: self-care encouraged  Goal: Skin Health and Integrity  Intervention: Optimize Skin Protection  Flowsheets (Taken 7/13/2024 0414)  Activity Management: Step side-to-side along edge of bed - L3  Head of Bed (HOB) Positioning: HOB at 30 degrees     Problem: Skin Injury Risk Increased  Goal: Skin Health and Integrity  Intervention: Optimize Skin Protection  Flowsheets (Taken 7/13/2024 0414)  Activity Management: Step side-to-side along edge of bed - L3  Head of Bed (HOB) Positioning: HOB at 30 degrees

## 2024-07-13 NOTE — SUBJECTIVE & OBJECTIVE
Dearaul Michel    Your reported symptoms require an in-person evaluation. I recommend that you seek care by contacting your primary care or specialty provider who regularly manages this condition.     One of our nurses will call you to discuss your symptoms and help you find in-person care.      If you feel you may be experiencing a medical emergency, contact 911 immediately. If you have any questions about your symptoms, contact us at [phone].     Thank You,   NENITA Ordonez     Interval History: No acute events overnight. Mentation improved today. pCO2 on VBG remains elevated, will continue BiPap throughout the day as tolerated. Will increase diuretic regimen with IV 80 mg lasix push w/ increase of lasix gtt to 40. Consider additional Diuril if additional diuresis needed this afternoon.    Review of Systems   Constitutional: Positive for weight gain. Negative for fever.   HENT:  Negative for congestion.    Cardiovascular:  Negative for leg swelling, near-syncope, palpitations and syncope.   Respiratory:  Positive for shortness of breath.    Gastrointestinal:  Negative for abdominal pain and heartburn.   Genitourinary:  Negative for dysuria and hematuria.   Psychiatric/Behavioral:  The patient is not nervous/anxious.    All other systems reviewed and are negative.    Objective:     Vital Signs (Most Recent):  Temp: 97.8 °F (36.6 °C) (07/13/24 1101)  Pulse: (!) 113 (07/13/24 1201)  Resp: 14 (07/13/24 1201)  BP: 97/64 (07/13/24 1201)  SpO2: 99 % (07/13/24 1222) Vital Signs (24h Range):  Temp:  [97 °F (36.1 °C)-98.2 °F (36.8 °C)] 97.8 °F (36.6 °C)  Pulse:  [105-124] 113  Resp:  [12-33] 14  SpO2:  [90 %-100 %] 99 %  BP: ()/(43-70) 97/64     Weight: 104.5 kg (230 lb 6.1 oz)  Body mass index is 38.34 kg/m².     SpO2: 99 %         Intake/Output Summary (Last 24 hours) at 7/13/2024 1357  Last data filed at 7/13/2024 1234  Gross per 24 hour   Intake 1479.22 ml   Output 2860 ml   Net -1380.78 ml       Lines/Drains/Airways       Central Venous Catheter Line  Duration             Percutaneous Central Line - Triple Lumen  07/08/24 1145 Internal Jugular Right 5 days              Peripheral Intravenous Line  Duration                  Peripheral IV - Single Lumen 07/12/24 1258 18 G 1 1/4 in No Distal;Left;Posterior Forearm 1 day                       Physical Exam  Vitals and nursing note reviewed.   Constitutional:       Appearance: Normal appearance.   HENT:      Right Ear: External ear normal.       Left Ear: External ear normal.      Mouth/Throat:      Mouth: Mucous membranes are moist.      Pharynx: No posterior oropharyngeal erythema.   Eyes:      Extraocular Movements: Extraocular movements intact.      Conjunctiva/sclera: Conjunctivae normal.   Cardiovascular:      Rate and Rhythm: Regular rhythm. Tachycardia present.   Pulmonary:      Effort: No respiratory distress.      Breath sounds: Rhonchi present. No wheezing.   Abdominal:      General: There is distension.   Musculoskeletal:      Right lower leg: Edema (2+) present.      Left lower leg: Edema (2+) present.   Skin:     General: Skin is warm.      Findings: No rash.   Neurological:      Mental Status: He is alert and oriented to person, place, and time. Mental status is at baseline.         Significant Labs: CMP   Recent Labs   Lab 07/12/24  0337 07/12/24  1507 07/13/24  0439 07/13/24  1155    137 138 138   K 3.5 4.0 3.4* 4.2   CL 92* 92* 94* 97   CO2 36* 35* 36* 33*   * 116* 124* 108   BUN 80* 78* 73* 66*   CREATININE 2.1* 2.1* 1.9* 1.9*   CALCIUM 9.4 9.1 9.3 9.5   PROT 7.2  --   --   --    ALBUMIN 2.5*  --   --   --    BILITOT 0.9  --   --   --    ALKPHOS 128  --   --   --    AST 19  --   --   --    ALT 9*  --   --   --    ANIONGAP 9 10 8 8    and CBC   Recent Labs   Lab 07/12/24  0337 07/13/24  0439   WBC 5.84 5.78   HGB 12.8* 12.7*   HCT 46.5 47.4    161

## 2024-07-13 NOTE — PLAN OF CARE
CICU DAILY GOALS       A: Awake    RASS: Goal - RASS Goal: 0-->alert and calm  Actual - RASS (Carter Agitation-Sedation Scale): alert and calm   Restraint necessity:    B: Breath   SBT: Pass   C: Coordinate A & B, analgesics/sedatives   Pain: managed    SAT: Pass  D: Delirium   CAM-ICU: Overall CAM-ICU: Negative  E: Early(intubated/ Progressive (non-intubated) Mobility   MOVE Screen: Pass   Activity: Activity Management: Step side-to-side along edge of bed - L3  FAS: Feeding/Nutrition   Diet order: Diet/Nutrition Received: 2 gram sodium,   Fluid restriction: Fluid Requirement: 1500cc FR   Nutritional Supplement Intake: Quantity N/A, Type:  N/A  T: Thrombus   DVT prophylaxis: VTE Required Core Measure: Pharmacological prophylaxis initiated/maintained  H: HOB Elevation   Head of Bed (HOB) Positioning: HOB at 30 degrees  U: Ulcer Prophylaxis   GI: no  G: Glucose control   managed      S: Skin   Nurses Note -- 4 Eyes  Skin assessed during: Daily Assessment   CHOOSE ONE:  [x] No Altered Skin Integrity Present      [x]Prevention Measures Documented    [] Yes- Altered Skin Integrity Present or Discovered     [] LDA Added if Not in Epic (Describe Wound)     [] New Altered Skin Integrity was Present on Admit and Documented in LDA     [] Wound Image Taken  Wound Care Consulted? No  Attending Nurse:  Celestina Natarajan RN/Staff Member:  Yes    B: Bowel Function   no issues   I: Indwelling Catheters   [place] necessity:     CVC necessity: Yes   IPAD offered:  N/A  D: De-escalation Antibx   No  Plan for the day   TBD  Family/Goals of care/Code Status   Code Status: Full Code     No acute events throughout day, VS and assessment per flow sheet, patient progressing towards goals as tolerated, plan of care reviewed with Yong Mcintyre and family, all concerns addressed, will continue to monitor.

## 2024-07-13 NOTE — ASSESSMENT & PLAN NOTE
On coumadin.    Lab Results   Component Value Date    INR 1.9 (H) 07/13/2024    INR 1.7 (H) 07/12/2024    INR 1.5 (H) 07/11/2024     -- Goal INR 2-3  -- Titrate warfarin daily

## 2024-07-13 NOTE — ASSESSMENT & PLAN NOTE
Bicarb 40's in setting of aggressive diuresis. Suspect contraction alkalosis.    -- Continue diamox  -- Trend Bicarb

## 2024-07-13 NOTE — ASSESSMENT & PLAN NOTE
Acute on chronic.  NYHA class III symptoms with recurrent admissions.  He was seen by [facility] outpatient 6/2024 who state patient was feeling better on new diuretic regimen however worried about the frequency of use of metolazone and his worsening kidney function.       CXR with cardiomegaly and pulmonary vascular congestion, suggestive of pulmonary edema secondary to CHF. BNP elevated at 800. Creatinine worse at 3.1 on admission.     Given his recurrent HF admissions and most recent hemodynamics, [facility] believes his overall prognosis is poor and recommended palliative care consult. Palliative care evaluated patient. Patient wishes to continue full measures at this time.     2 gram sodium restriction and 1500cc fluid restriction.  Encourage physical activity with graded exercise program.    Hemodynamics 7/13 AM:  CVP 16, SvO2 74, CO 10.7, CI 4.9,     -- Placed central line for CVP monitoring  -- Increase lasix gtt to 40  -- Continue Acetazolamide PO  -- Continue dobutamine gtt 7/12  -- Consider Diuril as needed  -- Monitor lytes BID; replete as indicated

## 2024-07-14 PROBLEM — I48.92 ATRIAL FLUTTER: Status: ACTIVE | Noted: 2024-07-14

## 2024-07-14 LAB
ALBUMIN SERPL BCP-MCNC: 2.6 G/DL (ref 3.5–5.2)
ALLENS TEST: ABNORMAL
ALLENS TEST: NORMAL
ALP SERPL-CCNC: 132 U/L (ref 55–135)
ALT SERPL W/O P-5'-P-CCNC: 11 U/L (ref 10–44)
ANION GAP SERPL CALC-SCNC: 10 MMOL/L (ref 8–16)
ANION GAP SERPL CALC-SCNC: 12 MMOL/L (ref 8–16)
AST SERPL-CCNC: 18 U/L (ref 10–40)
BASOPHILS # BLD AUTO: 0.05 K/UL (ref 0–0.2)
BASOPHILS NFR BLD: 0.8 % (ref 0–1.9)
BILIRUB SERPL-MCNC: 0.9 MG/DL (ref 0.1–1)
BUN SERPL-MCNC: 63 MG/DL (ref 6–20)
BUN SERPL-MCNC: 67 MG/DL (ref 6–20)
CALCIUM SERPL-MCNC: 9.7 MG/DL (ref 8.7–10.5)
CALCIUM SERPL-MCNC: 9.8 MG/DL (ref 8.7–10.5)
CHLORIDE SERPL-SCNC: 93 MMOL/L (ref 95–110)
CHLORIDE SERPL-SCNC: 93 MMOL/L (ref 95–110)
CO2 SERPL-SCNC: 34 MMOL/L (ref 23–29)
CO2 SERPL-SCNC: 38 MMOL/L (ref 23–29)
CREAT SERPL-MCNC: 1.9 MG/DL (ref 0.5–1.4)
CREAT SERPL-MCNC: 2 MG/DL (ref 0.5–1.4)
DELSYS: ABNORMAL
DELSYS: ABNORMAL
DELSYS: NORMAL
DIFFERENTIAL METHOD BLD: ABNORMAL
EOSINOPHIL # BLD AUTO: 0 K/UL (ref 0–0.5)
EOSINOPHIL NFR BLD: 0 % (ref 0–8)
EP: 11
ERYTHROCYTE [DISTWIDTH] IN BLOOD BY AUTOMATED COUNT: 21.3 % (ref 11.5–14.5)
ERYTHROCYTE [SEDIMENTATION RATE] IN BLOOD BY WESTERGREN METHOD: 26 MM/H
EST. GFR  (NO RACE VARIABLE): 40.4 ML/MIN/1.73 M^2
EST. GFR  (NO RACE VARIABLE): 43 ML/MIN/1.73 M^2
FIO2: 65
FLOW: 50
FLOW: 50
GLUCOSE SERPL-MCNC: 108 MG/DL (ref 70–110)
GLUCOSE SERPL-MCNC: 118 MG/DL (ref 70–110)
HCO3 UR-SCNC: 42 MMOL/L (ref 24–28)
HCT VFR BLD AUTO: 46.9 % (ref 40–54)
HGB BLD-MCNC: 12.2 G/DL (ref 14–18)
IMM GRANULOCYTES # BLD AUTO: 0.01 K/UL (ref 0–0.04)
IMM GRANULOCYTES NFR BLD AUTO: 0.2 % (ref 0–0.5)
INR PPP: 2.2 (ref 0.8–1.2)
IP: 18
LDH SERPL L TO P-CCNC: 0.61 MMOL/L (ref 0.5–2.2)
LYMPHOCYTES # BLD AUTO: 1 K/UL (ref 1–4.8)
LYMPHOCYTES NFR BLD: 16.5 % (ref 18–48)
MAGNESIUM SERPL-MCNC: 1.8 MG/DL (ref 1.6–2.6)
MAGNESIUM SERPL-MCNC: 2 MG/DL (ref 1.6–2.6)
MCH RBC QN AUTO: 23.6 PG (ref 27–31)
MCHC RBC AUTO-ENTMCNC: 26 G/DL (ref 32–36)
MCV RBC AUTO: 91 FL (ref 82–98)
MODE: ABNORMAL
MODE: ABNORMAL
MODE: NORMAL
MONOCYTES # BLD AUTO: 0.5 K/UL (ref 0.3–1)
MONOCYTES NFR BLD: 8.6 % (ref 4–15)
NEUTROPHILS # BLD AUTO: 4.4 K/UL (ref 1.8–7.7)
NEUTROPHILS NFR BLD: 73.9 % (ref 38–73)
NRBC BLD-RTO: 0 /100 WBC
OHS QRS DURATION: 102 MS
OHS QRS DURATION: 102 MS
OHS QTC CALCULATION: 409 MS
OHS QTC CALCULATION: 416 MS
PCO2 BLDA: 80.2 MMHG (ref 35–45)
PH SMN: 7.33 [PH] (ref 7.35–7.45)
PHOSPHATE SERPL-MCNC: 4.2 MG/DL (ref 2.7–4.5)
PLATELET # BLD AUTO: 157 K/UL (ref 150–450)
PMV BLD AUTO: 10.8 FL (ref 9.2–12.9)
PO2 BLDA: 34 MMHG (ref 40–60)
PO2 BLDA: 38 MMHG (ref 40–60)
PO2 BLDA: 41 MMHG (ref 40–60)
POC BE: 16 MMOL/L
POC SATURATED O2: 58 % (ref 95–100)
POC SATURATED O2: 64 % (ref 95–100)
POC SATURATED O2: 69 % (ref 95–100)
POC TCO2: 44 MMOL/L (ref 24–29)
POTASSIUM SERPL-SCNC: 2.9 MMOL/L (ref 3.5–5.1)
POTASSIUM SERPL-SCNC: 3.3 MMOL/L (ref 3.5–5.1)
POTASSIUM SERPL-SCNC: 4 MMOL/L (ref 3.5–5.1)
PROT SERPL-MCNC: 7.4 G/DL (ref 6–8.4)
PROTHROMBIN TIME: 23 SEC (ref 9–12.5)
RBC # BLD AUTO: 5.16 M/UL (ref 4.6–6.2)
SAMPLE: ABNORMAL
SAMPLE: NORMAL
SITE: ABNORMAL
SITE: NORMAL
SODIUM SERPL-SCNC: 139 MMOL/L (ref 136–145)
SODIUM SERPL-SCNC: 141 MMOL/L (ref 136–145)
THIOCYANATE: <0.6 MG/DL
WBC # BLD AUTO: 5.94 K/UL (ref 3.9–12.7)

## 2024-07-14 PROCEDURE — 94799 UNLISTED PULMONARY SVC/PX: CPT

## 2024-07-14 PROCEDURE — 80053 COMPREHEN METABOLIC PANEL: CPT

## 2024-07-14 PROCEDURE — 63600175 PHARM REV CODE 636 W HCPCS

## 2024-07-14 PROCEDURE — 84100 ASSAY OF PHOSPHORUS: CPT

## 2024-07-14 PROCEDURE — 94761 N-INVAS EAR/PLS OXIMETRY MLT: CPT

## 2024-07-14 PROCEDURE — 85610 PROTHROMBIN TIME: CPT

## 2024-07-14 PROCEDURE — 25000003 PHARM REV CODE 250

## 2024-07-14 PROCEDURE — 25000003 PHARM REV CODE 250: Performed by: STUDENT IN AN ORGANIZED HEALTH CARE EDUCATION/TRAINING PROGRAM

## 2024-07-14 PROCEDURE — 83735 ASSAY OF MAGNESIUM: CPT | Mod: 91 | Performed by: STUDENT IN AN ORGANIZED HEALTH CARE EDUCATION/TRAINING PROGRAM

## 2024-07-14 PROCEDURE — 94660 CPAP INITIATION&MGMT: CPT

## 2024-07-14 PROCEDURE — 63600175 PHARM REV CODE 636 W HCPCS: Performed by: STUDENT IN AN ORGANIZED HEALTH CARE EDUCATION/TRAINING PROGRAM

## 2024-07-14 PROCEDURE — 20000000 HC ICU ROOM

## 2024-07-14 PROCEDURE — 85025 COMPLETE CBC W/AUTO DIFF WBC: CPT

## 2024-07-14 PROCEDURE — 27100171 HC OXYGEN HIGH FLOW UP TO 24 HOURS

## 2024-07-14 PROCEDURE — 83735 ASSAY OF MAGNESIUM: CPT

## 2024-07-14 PROCEDURE — 99900035 HC TECH TIME PER 15 MIN (STAT)

## 2024-07-14 PROCEDURE — 84132 ASSAY OF SERUM POTASSIUM: CPT | Performed by: STUDENT IN AN ORGANIZED HEALTH CARE EDUCATION/TRAINING PROGRAM

## 2024-07-14 PROCEDURE — 82800 BLOOD PH: CPT

## 2024-07-14 PROCEDURE — 93005 ELECTROCARDIOGRAM TRACING: CPT

## 2024-07-14 PROCEDURE — 82803 BLOOD GASES ANY COMBINATION: CPT

## 2024-07-14 PROCEDURE — 80048 BASIC METABOLIC PNL TOTAL CA: CPT | Mod: XB | Performed by: INTERNAL MEDICINE

## 2024-07-14 PROCEDURE — 99231 SBSQ HOSP IP/OBS SF/LOW 25: CPT | Mod: GC,,, | Performed by: INTERNAL MEDICINE

## 2024-07-14 PROCEDURE — 83605 ASSAY OF LACTIC ACID: CPT

## 2024-07-14 PROCEDURE — 93010 ELECTROCARDIOGRAM REPORT: CPT | Mod: ,,, | Performed by: INTERNAL MEDICINE

## 2024-07-14 RX ORDER — POTASSIUM CHLORIDE 20 MEQ/1
40 TABLET, EXTENDED RELEASE ORAL ONCE
Status: COMPLETED | OUTPATIENT
Start: 2024-07-14 | End: 2024-07-14

## 2024-07-14 RX ORDER — MAGNESIUM SULFATE HEPTAHYDRATE 40 MG/ML
2 INJECTION, SOLUTION INTRAVENOUS ONCE
Status: COMPLETED | OUTPATIENT
Start: 2024-07-14 | End: 2024-07-14

## 2024-07-14 RX ORDER — DIGOXIN 0.25 MG/ML
250 INJECTION INTRAMUSCULAR; INTRAVENOUS EVERY 6 HOURS
Status: COMPLETED | OUTPATIENT
Start: 2024-07-14 | End: 2024-07-15

## 2024-07-14 RX ORDER — POTASSIUM CHLORIDE 20 MEQ/1
40 TABLET, EXTENDED RELEASE ORAL
Status: COMPLETED | OUTPATIENT
Start: 2024-07-14 | End: 2024-07-15

## 2024-07-14 RX ORDER — POTASSIUM CHLORIDE 29.8 MG/ML
40 INJECTION INTRAVENOUS ONCE
Status: COMPLETED | OUTPATIENT
Start: 2024-07-14 | End: 2024-07-14

## 2024-07-14 RX ADMIN — DIGOXIN 250 MCG: 250 INJECTION, SOLUTION INTRAMUSCULAR; INTRAVENOUS at 05:07

## 2024-07-14 RX ADMIN — ALLOPURINOL 300 MG: 100 TABLET ORAL at 08:07

## 2024-07-14 RX ADMIN — AMIODARONE HYDROCHLORIDE 1 MG/MIN: 1.8 INJECTION, SOLUTION INTRAVENOUS at 07:07

## 2024-07-14 RX ADMIN — MELATONIN TAB 3 MG 6 MG: 3 TAB at 09:07

## 2024-07-14 RX ADMIN — FUROSEMIDE 40 MG/HR: 10 INJECTION, SOLUTION INTRAMUSCULAR; INTRAVENOUS at 01:07

## 2024-07-14 RX ADMIN — ACETAZOLAMIDE 250 MG: 250 TABLET ORAL at 09:07

## 2024-07-14 RX ADMIN — AMIODARONE HYDROCHLORIDE 0.5 MG/MIN: 1.8 INJECTION, SOLUTION INTRAVENOUS at 09:07

## 2024-07-14 RX ADMIN — AMIODARONE HYDROCHLORIDE 150 MG: 1.5 INJECTION, SOLUTION INTRAVENOUS at 06:07

## 2024-07-14 RX ADMIN — PANTOPRAZOLE SODIUM 40 MG: 40 TABLET, DELAYED RELEASE ORAL at 08:07

## 2024-07-14 RX ADMIN — POTASSIUM CHLORIDE 40 MEQ: 1500 TABLET, EXTENDED RELEASE ORAL at 06:07

## 2024-07-14 RX ADMIN — MUPIROCIN: 20 OINTMENT TOPICAL at 08:07

## 2024-07-14 RX ADMIN — ACETAMINOPHEN 650 MG: 325 TABLET ORAL at 02:07

## 2024-07-14 RX ADMIN — POTASSIUM CHLORIDE 40 MEQ: 29.8 INJECTION, SOLUTION INTRAVENOUS at 05:07

## 2024-07-14 RX ADMIN — DOBUTAMINE HYDROCHLORIDE 5 MCG/KG/MIN: 400 INJECTION INTRAVENOUS at 09:07

## 2024-07-14 RX ADMIN — ACETAZOLAMIDE 250 MG: 250 TABLET ORAL at 08:07

## 2024-07-14 RX ADMIN — WARFARIN SODIUM 5 MG: 5 TABLET ORAL at 05:07

## 2024-07-14 RX ADMIN — MAGNESIUM SULFATE HEPTAHYDRATE 2 G: 40 INJECTION, SOLUTION INTRAVENOUS at 05:07

## 2024-07-14 RX ADMIN — DIGOXIN 250 MCG: 250 INJECTION, SOLUTION INTRAMUSCULAR; INTRAVENOUS at 12:07

## 2024-07-14 RX ADMIN — ACETAMINOPHEN 650 MG: 325 TABLET ORAL at 12:07

## 2024-07-14 RX ADMIN — POTASSIUM CHLORIDE 40 MEQ: 1500 TABLET, EXTENDED RELEASE ORAL at 09:07

## 2024-07-14 RX ADMIN — AMIODARONE HYDROCHLORIDE 1 MG/MIN: 1.8 INJECTION, SOLUTION INTRAVENOUS at 12:07

## 2024-07-14 RX ADMIN — FUROSEMIDE 40 MG/HR: 10 INJECTION, SOLUTION INTRAMUSCULAR; INTRAVENOUS at 03:07

## 2024-07-14 NOTE — SUBJECTIVE & OBJECTIVE
Interval History: Patient developed Aflutter, so amio will be done for rhythm control. Diuresed overnight.     HD 07/14/24  CVP:  17   SVO2:  58   CO 5.6   CI 2.6   SVR:  973       Review of Systems   Constitutional: Negative.   HENT: Negative.     Cardiovascular:  Positive for leg swelling. Negative for chest pain, claudication, cyanosis, dyspnea on exertion, irregular heartbeat, near-syncope, orthopnea, palpitations and paroxysmal nocturnal dyspnea.   Respiratory:  Positive for shortness of breath.    Endocrine: Negative.    Musculoskeletal: Negative.    Gastrointestinal: Negative.    Genitourinary: Negative.    Neurological: Negative.      Objective:     Vital Signs (Most Recent):  Temp: 96.1 °F (35.6 °C) (07/14/24 0600)  Pulse: (!) 134 (07/14/24 0643)  Resp: 19 (07/14/24 0643)  BP: 124/79 (07/14/24 0600)  SpO2: (!) 89 % (07/14/24 0643) Vital Signs (24h Range):  Temp:  [96.1 °F (35.6 °C)-98.1 °F (36.7 °C)] 96.1 °F (35.6 °C)  Pulse:  [109-134] 134  Resp:  [14-45] 19  SpO2:  [85 %-99 %] 89 %  BP: ()/(51-79) 124/79     Weight: 104.5 kg (230 lb 6.1 oz)  Body mass index is 38.34 kg/m².     SpO2: (!) 89 %         Intake/Output Summary (Last 24 hours) at 7/14/2024 0710  Last data filed at 7/14/2024 0611  Gross per 24 hour   Intake 2087.51 ml   Output 5250 ml   Net -3162.49 ml       Lines/Drains/Airways       Central Venous Catheter Line  Duration             Percutaneous Central Line - Triple Lumen  07/08/24 1145 Internal Jugular Right 5 days              Peripheral Intravenous Line  Duration                  Peripheral IV - Single Lumen 07/12/24 1258 18 G 1 1/4 in No Distal;Left;Posterior Forearm 1 day                  Telemetry: Sinus rhythm and Afib/Aflutter     Physical Exam  Vitals and nursing note reviewed.   Constitutional:       General: He is not in acute distress.     Appearance: Normal appearance. He is normal weight. He is not ill-appearing or diaphoretic.   HENT:      Head: Normocephalic and  atraumatic.   Eyes:      Pupils: Pupils are equal, round, and reactive to light.   Cardiovascular:      Rate and Rhythm: Regular rhythm. Tachycardia present.      Pulses: Normal pulses.      Heart sounds: Normal heart sounds.   Pulmonary:      Effort: Pulmonary effort is normal.      Breath sounds: Rhonchi present.   Abdominal:      General: Abdomen is flat. Bowel sounds are normal. There is no distension.      Palpations: Abdomen is soft. There is no mass.      Tenderness: There is no abdominal tenderness.   Musculoskeletal:         General: Normal range of motion.      Right lower leg: Edema present.      Left lower leg: Edema present.   Skin:     General: Skin is warm.      Capillary Refill: Capillary refill takes less than 2 seconds.   Neurological:      General: No focal deficit present.      Mental Status: He is alert and oriented to person, place, and time.            Significant Labs:     Recent Labs   Lab 07/12/24 0337 07/13/24 0439 07/14/24 0337   WBC 5.84 5.78 5.94   HGB 12.8* 12.7* 12.2*   HCT 46.5 47.4 46.9    161 157       Recent Labs   Lab 07/12/24  0337 07/12/24  1507 07/13/24  0439 07/13/24  1155 07/14/24 0337      < > 138 138 141   K 3.5   < > 3.4* 4.2 2.9*   CL 92*   < > 94* 97 93*   CO2 36*   < > 36* 33* 38*   BUN 80*   < > 73* 66* 67*   CREATININE 2.1*   < > 1.9* 1.9* 2.0*   CALCIUM 9.4   < > 9.3 9.5 9.7   PHOS 4.6*  --  4.8*  --  4.2    < > = values in this interval not displayed.       Recent Labs   Lab 07/11/24  0359 07/12/24 0337 07/14/24 0337   ALKPHOS 123 128 132   BILITOT 1.1* 0.9 0.9   PROT 7.4 7.2 7.4   ALT 11 9* 11   AST 21 19 18       Lab Results   Component Value Date    CHOL 129 06/12/2024    CHOL 162 03/16/2022    HDL 45 06/12/2024    HDL 50 03/16/2022    LDLCALC 64 06/12/2024    LDLCALC 88.0 03/16/2022    TRIG 102 06/12/2024    TRIG 120 03/16/2022     Lab Results   Component Value Date    HGBA1C 5.8 (H) 01/19/2024       Lab Results   Component Value Date    BNP  804 (H) 07/04/2024     (H) 06/06/2024     (H) 04/22/2024              Significant Imaging:     TTE 0 7/05/24    Left Ventricle: Mild global hypokinesis present. Septal flattening in diastole and systole consistent with right ventricular volume and pressure overload. There is moderately reduced systolic function with a visually estimated ejection fraction of 35 - 40%. Grade I diastolic dysfunction. Eatimated CO is 3.4 l/min    Right Ventricle: Severe right ventricular enlargement. Wall thickness is normal. Right ventricle wall motion has global hypokinesis. Systolic function is severely reduced.    Right Atrium: Right atrium is severely dilated.    Aortic Valve: The aortic valve is a trileaflet valve. There is mild aortic valve sclerosis. There is moderate annular calcification present.    Tricuspid Valve: There is moderate regurgitation with an anteromedial eccentrically directed jet.    Aorta: Aortic root is normal in size measuring 2.77 cm. Ascending aorta is normal measuring 2.51 cm.    Pulmonary Artery: There is severe pulmonary hypertension. The estimated pulmonary artery systolic pressure is 81 mmHg.    IVC/SVC: Elevated venous pressure at 15 mmHg.

## 2024-07-14 NOTE — ASSESSMENT & PLAN NOTE
Bicarb 40's in setting of aggressive diuresis. Suspect contraction alkalosis.    - Continue diamox  - Trend Bicarb

## 2024-07-14 NOTE — PLAN OF CARE
[facility] - Cardiac [facility]  ICU Shift Summary  Date: 7/14/2024      Prehospitalization: Home  Admit Date / LOS : 7/4/2024/ 10 days    Diagnosis: Right ventricular dysfunction    Consults:        Active: Cardio, palliative        Needed: N/A     Code Status: Full Code   Advanced Directive: <no information>    LDA:  Lines/Drains/Airways       Central Venous Catheter Line  Duration             Percutaneous Central Line - Triple Lumen  07/08/24 1145 Internal Jugular Right 6 days              Drain  Duration             Female External Urinary Catheter w/ Suction 07/14/24 1341 <1 day              Peripheral Intravenous Line  Duration                  Peripheral IV - Single Lumen 07/12/24 1258 18 G 1 1/4 in No Distal;Left;Posterior Forearm 2 days                  Central Lines/Site/Justification:Multiple GTTS, pressors   Urinary Cath/Order/Justification:Patient Does Not Have Urinary Catheter    Vasopressors/Infusions:    amiodarone in dextrose 5%  0.5 mg/min Intravenous Continuous 16.7 mL/hr at 07/14/24 1601 0.5 mg/min at 07/14/24 1601    DOBUTamine IV infusion (non-titrating)  5 mcg/kg/min Intravenous Continuous 7.8 mL/hr at 07/14/24 1601 5 mcg/kg/min at 07/14/24 1601    furosemide (Lasix) 500 mg in 50 mL infusion (conc: 10 mg/mL)  40 mg/hr Intravenous Continuous 4 mL/hr at 07/14/24 1601 40 mg/hr at 07/14/24 1601          GOALS: Volume/ Hemodynamic: MAP >65                     RASS: 0  alert and calm    Pain Management: none       Pain Controlled: yes     Rhythm: A-Flutter    Respiratory Device: Bipap    Oxygen Concentration (%):  [65-80] 65                 Most Recent SBT/ SAT: N/A       MOVE Screen: FAIL  ICU Liberation: no    VTE Prophylaxis: Pharm  Mobility: Bedrest  Stress Ulcer Prophylaxis: Yes    Isolation: No active isolations    Dietary:   Current Diet Order   Procedures    Diet Low Sodium, 2gm Fluid - 1500mL     Order Specific Question:   Fluid restriction:     Answer:   Fluid - 1500mL      Tolerance:  "yes  Advancement: @ goal    I & O (24h):    Intake/Output Summary (Last 24 hours) at 7/14/2024 1624  Last data filed at 7/14/2024 1601  Gross per 24 hour   Intake 2640.54 ml   Output 5250 ml   Net -2609.46 ml        Restraints: No    Significant Dates:  Post Op Date: N/A  Rescue Date: N/A  Imaging/ Diagnostics: N/A          CBC/Anemia Labs: Coags:    Recent Labs   Lab 07/13/24 0439 07/14/24 0337   WBC 5.78 5.94   HGB 12.7* 12.2*   HCT 47.4 46.9    157   MCV 89 91   RDW 21.8* 21.3*    Recent Labs   Lab 07/13/24 0439 07/14/24  0337   INR 1.9* 2.2*        Chemistries:   Recent Labs   Lab 07/12/24  0337 07/12/24  1242 07/13/24  0439 07/13/24  1155 07/14/24  0337 07/14/24  1046      < > 138   < > 141 139   K 3.5   < > 3.4*   < > 2.9* 4.0   CL 92*   < > 94*   < > 93* 93*   CO2 36*   < > 36*   < > 38* 34*   BUN 80*   < > 73*   < > 67* 63*   CREATININE 2.1*   < > 1.9*   < > 2.0* 1.9*   CALCIUM 9.4   < > 9.3   < > 9.7 9.8   PROT 7.2  --   --   --  7.4  --    BILITOT 0.9  --   --   --  0.9  --    ALKPHOS 128  --   --   --  132  --    ALT 9*  --   --   --  11  --    AST 19  --   --   --  18  --    MG 1.8   < > 1.9  --  1.8  --    PHOS 4.6*  --  4.8*  --  4.2  --     < > = values in this interval not displayed.        Cardiac Enzymes: Ejection Fractions:    No results for input(s): "CPK", "CPKMB", "MB", "TROPONINI" in the last 72 hours. EF   Date Value Ref Range Status   03/17/2024 60 % Final        POCT Glucose: HbA1c:    No results for input(s): "POCTGLUCOSE" in the last 168 hours. Hemoglobin A1C   Date Value Ref Range Status   01/19/2024 5.8 (H) 4.0 - 5.6 % Final     Comment:     ADA Screening Guidelines:  5.7-6.4%  Consistent with prediabetes  >or=6.5%  Consistent with diabetes    High levels of fetal hemoglobin interfere with the HbA1C  assay. Heterozygous hemoglobin variants (HbS, HgC, etc)do  not significantly interfere with this assay.   However, presence of multiple variants may affect accuracy.      "        ICU LOS 9d 16h  Level of Care: Critical Care    Chart Check: 12 HR Done    Problem: Adult Inpatient Plan of Care  Goal: Optimal Comfort and Wellbeing  Outcome: Not Progressing  Goal: Readiness for Transition of Care  Outcome: Not Progressing     Problem: Wound  Goal: Optimal Coping  Outcome: Progressing     Problem: Heart Failure  Goal: Optimal Coping  Outcome: Not Progressing  Goal: Optimal Cardiac Output  Outcome: Not Progressing

## 2024-07-14 NOTE — CARE UPDATE
Brief Update note  Went into 2:1 aflutter. Has hx of pafib, this is first time in flutter. Onset was 6:19am. Tino attempt chemical cardioversion given that patient is early onset aflutter. Rate control is limited by his dobutamine requirement. Patient already on anticoagulation. Discussed with Dr. Leary.    Wilian Webb MD  Cardiovascular Medicine Fellow  [facility]

## 2024-07-14 NOTE — ASSESSMENT & PLAN NOTE
- New onset  - On warfarin for Afib  - Patient on warfarin but has been subtherapeutic, will need to assess risk of cardioversion vs benefit to avoid HF in a patient with HF and tachycardia    - Continue AC as per Afib  - Started Amiodarone for rhythm control, and will add digoxin for rate control  - Monitor electrolytes  - If continue on Aflutter consult EP tomorrow

## 2024-07-14 NOTE — ASSESSMENT & PLAN NOTE
Patient with Hypercapnic and Hypoxic Respiratory failure which is Acute on chronic.  he is on home oxygen at 3 LPM. Supplemental oxygen was provided and noted- Oxygen Concentration (%):  [55-84] 65    Signs/symptoms of respiratory failure include- tachypnea and increased work of breathing. Contributing diagnoses includes - CHF Labs and images were reviewed. Patient Has recent ABG, which has been reviewed.    -- Trend VBG BID  -- BiPap during day as tolerated; okay for HFNC w/ meals  -- Goal SpO2 > 88%

## 2024-07-14 NOTE — ASSESSMENT & PLAN NOTE
On coumadin.    Lab Results   Component Value Date    INR 2.2 (H) 07/14/2024    INR 1.9 (H) 07/13/2024    INR 1.7 (H) 07/12/2024     -- Goal INR 2-3  -- Titrate warfarin daily

## 2024-07-14 NOTE — ASSESSMENT & PLAN NOTE
BIPAP QHS; HFNC during meals.  Significant hypercapnia w/ pCO2 80-90; will start daytime BiPap    - BIPAP for ventilation preferred over high O2

## 2024-07-14 NOTE — ASSESSMENT & PLAN NOTE
WHO group 2-3 per his managing pulmonologist (Danyell at [facility])  Adherent with diet, CPAP, and on continuous O2 3L at home.     -- RV dysfunction treatment as above

## 2024-07-14 NOTE — ASSESSMENT & PLAN NOTE
Acute on chronic. - NYHA class III symptoms with recurrent admissions.  - He was seen by [facility] outpatient 6/2024 who state patient was feeling better on new diuretic regimen however worried about the frequency of use of metolazone and his worsening kidney function.    - Admitted with CXR with cardiomegaly and pulmonary vascular congestion, suggestive of pulmonary edema secondary to CHF. BNP elevated at 800. Creatinine worse at 3.1 on admission.  - Given his recurrent HF admissions and most recent hemodynamics, [facility] believes his overall prognosis is poor and recommended palliative care consult. Palliative care evaluated patient. Patient wishes to continue full measures at this time.     - 2 gram sodium restriction and 1500cc fluid restriction.  - Encourage physical activity with graded exercise program.  - Continue central line for CVP monitoring  - Lasix increased to 80mg IV push and then gtt to 40  - Continue Acetazolamide PO  - Continue dobutamine gtt  - Diuril as needed  - Monitor lytes BID; replete as indicated

## 2024-07-14 NOTE — PROGRESS NOTES
[facility] - Cardiac Intensive Care  Cardiology  Progress Note    Patient Name: Yong Mcintyre  MRN: [id number]  Admission Date: 7/4/2024  Hospital Length of Stay: 10 days  Code Status: Full Code   Attending Physician: Rossy Leary MD   Primary Care Physician: Gianni Escalona MD  Expected Discharge Date: 7/16/2024  Principal Problem:Right ventricular dysfunction    Subjective:     Hospital Course:   The patient was admitted to the CCU for further management of right-sided heart failure. He was diuresed with a lasix gtt. Electrolytes were monitored and repleted as indicated. Palliative care was consulted given his poor prognosis. The patient had significant O2 requirements on admission which were slow to wean. A central line was placed for close CVP monitoring in the setting of aggressive diuresis. Nutrition consult placed for low salt diet education. Acetazolamide was added to his diuretic regimen due to persistent metabolic alkalosis. The patient required intermittent Bipap due to hypercapnic respiratory failure. Dobutamine was added to augment cardiac output to further diurese patient.    Interval History: Patient developed Aflutter, so amio will be done for rhythm control. Diuresed overnight.     HD 07/14/24  CVP:  17   SVO2:  58   CO 5.6   CI 2.6   SVR:  973       Review of Systems   Constitutional: Negative.   HENT: Negative.     Cardiovascular:  Positive for leg swelling. Negative for chest pain, claudication, cyanosis, dyspnea on exertion, irregular heartbeat, near-syncope, orthopnea, palpitations and paroxysmal nocturnal dyspnea.   Respiratory:  Positive for shortness of breath.    Endocrine: Negative.    Musculoskeletal: Negative.    Gastrointestinal: Negative.    Genitourinary: Negative.    Neurological: Negative.      Objective:     Vital Signs (Most Recent):  Temp: 96.1 °F (35.6 °C) (07/14/24 0600)  Pulse: (!) 134 (07/14/24 0643)  Resp: 19 (07/14/24 0643)  BP: 124/79 (07/14/24 0600)  SpO2: (!)  89 % (07/14/24 0643) Vital Signs (24h Range):  Temp:  [96.1 °F (35.6 °C)-98.1 °F (36.7 °C)] 96.1 °F (35.6 °C)  Pulse:  [109-134] 134  Resp:  [14-45] 19  SpO2:  [85 %-99 %] 89 %  BP: ()/(51-79) 124/79     Weight: 104.5 kg (230 lb 6.1 oz)  Body mass index is 38.34 kg/m².     SpO2: (!) 89 %         Intake/Output Summary (Last 24 hours) at 7/14/2024 0710  Last data filed at 7/14/2024 0611  Gross per 24 hour   Intake 2087.51 ml   Output 5250 ml   Net -3162.49 ml       Lines/Drains/Airways       Central Venous Catheter Line  Duration             Percutaneous Central Line - Triple Lumen  07/08/24 1145 Internal Jugular Right 5 days              Peripheral Intravenous Line  Duration                  Peripheral IV - Single Lumen 07/12/24 1258 18 G 1 1/4 in No Distal;Left;Posterior Forearm 1 day                  Telemetry: Sinus rhythm and Afib/Aflutter     Physical Exam  Vitals and nursing note reviewed.   Constitutional:       General: He is not in acute distress.     Appearance: Normal appearance. He is normal weight. He is not ill-appearing or diaphoretic.   HENT:      Head: Normocephalic and atraumatic.   Eyes:      Pupils: Pupils are equal, round, and reactive to light.   Cardiovascular:      Rate and Rhythm: Regular rhythm. Tachycardia present.      Pulses: Normal pulses.      Heart sounds: Normal heart sounds.   Pulmonary:      Effort: Pulmonary effort is normal.      Breath sounds: Rhonchi present.   Abdominal:      General: Abdomen is flat. Bowel sounds are normal. There is no distension.      Palpations: Abdomen is soft. There is no mass.      Tenderness: There is no abdominal tenderness.   Musculoskeletal:         General: Normal range of motion.      Right lower leg: Edema present.      Left lower leg: Edema present.   Skin:     General: Skin is warm.      Capillary Refill: Capillary refill takes less than 2 seconds.   Neurological:      General: No focal deficit present.      Mental Status: He is alert and  oriented to person, place, and time.            Significant Labs:     Recent Labs   Lab 07/12/24  0337 07/13/24  0439 07/14/24  0337   WBC 5.84 5.78 5.94   HGB 12.8* 12.7* 12.2*   HCT 46.5 47.4 46.9    161 157       Recent Labs   Lab 07/12/24  0337 07/12/24  1507 07/13/24  0439 07/13/24  1155 07/14/24  0337      < > 138 138 141   K 3.5   < > 3.4* 4.2 2.9*   CL 92*   < > 94* 97 93*   CO2 36*   < > 36* 33* 38*   BUN 80*   < > 73* 66* 67*   CREATININE 2.1*   < > 1.9* 1.9* 2.0*   CALCIUM 9.4   < > 9.3 9.5 9.7   PHOS 4.6*  --  4.8*  --  4.2    < > = values in this interval not displayed.       Recent Labs   Lab 07/11/24  0359 07/12/24 0337 07/14/24 0337   ALKPHOS 123 128 132   BILITOT 1.1* 0.9 0.9   PROT 7.4 7.2 7.4   ALT 11 9* 11   AST 21 19 18       Lab Results   Component Value Date    CHOL 129 06/12/2024    CHOL 162 03/16/2022    HDL 45 06/12/2024    HDL 50 03/16/2022    LDLCALC 64 06/12/2024    LDLCALC 88.0 03/16/2022    TRIG 102 06/12/2024    TRIG 120 03/16/2022     Lab Results   Component Value Date    HGBA1C 5.8 (H) 01/19/2024       Lab Results   Component Value Date     (H) 07/04/2024     (H) 06/06/2024     (H) 04/22/2024              Significant Imaging:     TTE 0 7/05/24    Left Ventricle: Mild global hypokinesis present. Septal flattening in diastole and systole consistent with right ventricular volume and pressure overload. There is moderately reduced systolic function with a visually estimated ejection fraction of 35 - 40%. Grade I diastolic dysfunction. Eatimated CO is 3.4 l/min    Right Ventricle: Severe right ventricular enlargement. Wall thickness is normal. Right ventricle wall motion has global hypokinesis. Systolic function is severely reduced.    Right Atrium: Right atrium is severely dilated.    Aortic Valve: The aortic valve is a trileaflet valve. There is mild aortic valve sclerosis. There is moderate annular calcification present.    Tricuspid Valve: There is  moderate regurgitation with an anteromedial eccentrically directed jet.    Aorta: Aortic root is normal in size measuring 2.77 cm. Ascending aorta is normal measuring 2.51 cm.    Pulmonary Artery: There is severe pulmonary hypertension. The estimated pulmonary artery systolic pressure is 81 mmHg.    IVC/SVC: Elevated venous pressure at 15 mmHg.  Assessment and Plan:     * Right ventricular dysfunction  Acute on chronic. - NYHA class III symptoms with recurrent admissions.  - He was seen by [facility] outpatient 6/2024 who state patient was feeling better on new diuretic regimen however worried about the frequency of use of metolazone and his worsening kidney function.    - Admitted with CXR with cardiomegaly and pulmonary vascular congestion, suggestive of pulmonary edema secondary to CHF. BNP elevated at 800. Creatinine worse at 3.1 on admission.  - Given his recurrent HF admissions and most recent hemodynamics, [facility] believes his overall prognosis is poor and recommended palliative care consult. Palliative care evaluated patient. Patient wishes to continue full measures at this time.     - 2 gram sodium restriction and 1500cc fluid restriction.  - Encourage physical activity with graded exercise program.  - Continue central line for CVP monitoring  - Lasix increased to 80mg IV push and then gtt to 40  - Continue Acetazolamide PO  - Continue dobutamine gtt  - Diuril as needed  - Monitor lytes BID; replete as indicated    Atrial flutter  - New onset  - On warfarin for Afib  - Patient on warfarin but has been subtherapeutic, will need to assess risk of cardioversion vs benefit to avoid HF in a patient with HF and tachycardia    - Continue AC as per Afib  - Started Amiodarone for rhythm control, and will add digoxin for rate control  - Monitor electrolytes  - If continue on Aflutter consult EP tomorrow      Metabolic alkalosis  Bicarb 40's in setting of aggressive diuresis. Suspect contraction alkalosis.    - Continue  diamox  - Trend Bicarb    Acute on chronic respiratory failure with hypoxia  Patient with Hypercapnic and Hypoxic Respiratory failure which is Acute on chronic.  he is on home oxygen at 3 LPM. Supplemental oxygen was provided and noted- Oxygen Concentration (%):  [55-84] 65    Signs/symptoms of respiratory failure include- tachypnea and increased work of breathing. Contributing diagnoses includes - CHF Labs and images were reviewed. Patient Has recent ABG, which has been reviewed.    -- Trend VBG BID  -- BiPap during day as tolerated; okay for HFNC w/ meals  -- Goal SpO2 > 88%    Acute on chronic right-sided congestive heart failure  - See 'RV dysfunction'    Paroxysmal A-fib  On coumadin.    Lab Results   Component Value Date    INR 2.2 (H) 07/14/2024    INR 1.9 (H) 07/13/2024    INR 1.7 (H) 07/12/2024     -- Goal INR 2-3  -- Titrate warfarin daily      MALLIKA (obstructive sleep apnea)  See 'hypoventilation syndrome'    Pulmonary hypertension  WHO group 2-3 per his managing pulmonologist (Danyell at [facility])  Adherent with diet, CPAP, and on continuous O2 3L at home.     -- RV dysfunction treatment as above    Hypoventilation syndrome  BIPAP QHS; HFNC during meals.  Significant hypercapnia w/ pCO2 80-90; will start daytime BiPap    - BIPAP for ventilation preferred over high O2          VTE Risk Mitigation (From admission, onward)           Ordered     warfarin (COUMADIN) tablet 5 mg  Daily         07/12/24 0751     IP VTE HIGH RISK PATIENT  Once         07/04/24 2302     Place sequential compression device  Until discontinued         07/04/24 2302                    Terrence Ramos MD  Cardiology  [facility] - Cardiac Intensive Care

## 2024-07-15 PROBLEM — I48.4 ATYPICAL ATRIAL FLUTTER: Status: ACTIVE | Noted: 2024-07-14

## 2024-07-15 LAB
ALBUMIN SERPL BCP-MCNC: 2.8 G/DL (ref 3.5–5.2)
ALLENS TEST: ABNORMAL
ALP SERPL-CCNC: 145 U/L (ref 55–135)
ALT SERPL W/O P-5'-P-CCNC: 11 U/L (ref 10–44)
ANION GAP SERPL CALC-SCNC: 10 MMOL/L (ref 8–16)
ANION GAP SERPL CALC-SCNC: 9 MMOL/L (ref 8–16)
ANISOCYTOSIS BLD QL SMEAR: SLIGHT
AST SERPL-CCNC: 19 U/L (ref 10–40)
BASOPHILS # BLD AUTO: 0.05 K/UL (ref 0–0.2)
BASOPHILS NFR BLD: 0.8 % (ref 0–1.9)
BILIRUB SERPL-MCNC: 0.8 MG/DL (ref 0.1–1)
BUN SERPL-MCNC: 57 MG/DL (ref 6–20)
BUN SERPL-MCNC: 59 MG/DL (ref 6–20)
CALCIUM SERPL-MCNC: 9.8 MG/DL (ref 8.7–10.5)
CALCIUM SERPL-MCNC: 9.9 MG/DL (ref 8.7–10.5)
CHLORIDE SERPL-SCNC: 93 MMOL/L (ref 95–110)
CHLORIDE SERPL-SCNC: 94 MMOL/L (ref 95–110)
CO2 SERPL-SCNC: 37 MMOL/L (ref 23–29)
CO2 SERPL-SCNC: 38 MMOL/L (ref 23–29)
CREAT SERPL-MCNC: 1.9 MG/DL (ref 0.5–1.4)
CREAT SERPL-MCNC: 2 MG/DL (ref 0.5–1.4)
DELSYS: ABNORMAL
DELSYS: ABNORMAL
DIFFERENTIAL METHOD BLD: ABNORMAL
EOSINOPHIL # BLD AUTO: 0 K/UL (ref 0–0.5)
EOSINOPHIL NFR BLD: 0 % (ref 0–8)
EP: 11
EP: 11
ERYTHROCYTE [DISTWIDTH] IN BLOOD BY AUTOMATED COUNT: 21.6 % (ref 11.5–14.5)
ERYTHROCYTE [SEDIMENTATION RATE] IN BLOOD BY WESTERGREN METHOD: 26 MM/H
ERYTHROCYTE [SEDIMENTATION RATE] IN BLOOD BY WESTERGREN METHOD: 26 MM/H
EST. GFR  (NO RACE VARIABLE): 40.4 ML/MIN/1.73 M^2
EST. GFR  (NO RACE VARIABLE): 43 ML/MIN/1.73 M^2
FIO2: 50
FIO2: 65
GLUCOSE SERPL-MCNC: 103 MG/DL (ref 70–110)
GLUCOSE SERPL-MCNC: 138 MG/DL (ref 70–110)
HCO3 UR-SCNC: 42.1 MMOL/L (ref 24–28)
HCO3 UR-SCNC: 42.7 MMOL/L (ref 24–28)
HCO3 UR-SCNC: 43.6 MMOL/L (ref 24–28)
HCT VFR BLD AUTO: 49 % (ref 40–54)
HGB BLD-MCNC: 13.3 G/DL (ref 14–18)
IMM GRANULOCYTES # BLD AUTO: 0.01 K/UL (ref 0–0.04)
IMM GRANULOCYTES NFR BLD AUTO: 0.2 % (ref 0–0.5)
INR PPP: 2.3 (ref 0.8–1.2)
IP: 18
IP: 18
LYMPHOCYTES # BLD AUTO: 1 K/UL (ref 1–4.8)
LYMPHOCYTES NFR BLD: 16.1 % (ref 18–48)
MAGNESIUM SERPL-MCNC: 2 MG/DL (ref 1.6–2.6)
MCH RBC QN AUTO: 24.2 PG (ref 27–31)
MCHC RBC AUTO-ENTMCNC: 27.1 G/DL (ref 32–36)
MCV RBC AUTO: 89 FL (ref 82–98)
MODE: ABNORMAL
MODE: ABNORMAL
MONOCYTES # BLD AUTO: 0.5 K/UL (ref 0.3–1)
MONOCYTES NFR BLD: 7.6 % (ref 4–15)
NEUTROPHILS # BLD AUTO: 4.6 K/UL (ref 1.8–7.7)
NEUTROPHILS NFR BLD: 75.3 % (ref 38–73)
NRBC BLD-RTO: 0 /100 WBC
OHS QRS DURATION: 102 MS
OHS QTC CALCULATION: 439 MS
PCO2 BLDA: 81.4 MMHG (ref 35–45)
PCO2 BLDA: 83.5 MMHG (ref 35–45)
PCO2 BLDA: 88.7 MMHG (ref 35–45)
PH SMN: 7.3 [PH] (ref 7.35–7.45)
PH SMN: 7.32 [PH] (ref 7.35–7.45)
PH SMN: 7.32 [PH] (ref 7.35–7.45)
PHOSPHATE SERPL-MCNC: 4.3 MG/DL (ref 2.7–4.5)
PLATELET # BLD AUTO: 166 K/UL (ref 150–450)
PLATELET BLD QL SMEAR: ABNORMAL
PMV BLD AUTO: 10.7 FL (ref 9.2–12.9)
PO2 BLDA: 38 MMHG (ref 40–60)
PO2 BLDA: 41 MMHG (ref 40–60)
PO2 BLDA: 48 MMHG (ref 40–60)
POC BE: 16 MMOL/L
POC BE: 17 MMOL/L
POC BE: 17 MMOL/L
POC SATURATED O2: 63 % (ref 95–100)
POC SATURATED O2: 68 % (ref 95–100)
POC SATURATED O2: 76 % (ref 95–100)
POC TCO2: 45 MMOL/L (ref 24–29)
POC TCO2: 45 MMOL/L (ref 24–29)
POC TCO2: 46 MMOL/L (ref 24–29)
POTASSIUM SERPL-SCNC: 3.6 MMOL/L (ref 3.5–5.1)
POTASSIUM SERPL-SCNC: 4 MMOL/L (ref 3.5–5.1)
PROT SERPL-MCNC: 8.1 G/DL (ref 6–8.4)
PROTHROMBIN TIME: 24.1 SEC (ref 9–12.5)
RBC # BLD AUTO: 5.49 M/UL (ref 4.6–6.2)
SAMPLE: ABNORMAL
SITE: ABNORMAL
SODIUM SERPL-SCNC: 140 MMOL/L (ref 136–145)
SODIUM SERPL-SCNC: 141 MMOL/L (ref 136–145)
WBC # BLD AUTO: 6.16 K/UL (ref 3.9–12.7)

## 2024-07-15 PROCEDURE — 63600175 PHARM REV CODE 636 W HCPCS: Performed by: STUDENT IN AN ORGANIZED HEALTH CARE EDUCATION/TRAINING PROGRAM

## 2024-07-15 PROCEDURE — 25000003 PHARM REV CODE 250: Performed by: STUDENT IN AN ORGANIZED HEALTH CARE EDUCATION/TRAINING PROGRAM

## 2024-07-15 PROCEDURE — 86235 NUCLEAR ANTIGEN ANTIBODY: CPT | Mod: 59 | Performed by: STUDENT IN AN ORGANIZED HEALTH CARE EDUCATION/TRAINING PROGRAM

## 2024-07-15 PROCEDURE — 63600175 PHARM REV CODE 636 W HCPCS

## 2024-07-15 PROCEDURE — 86039 ANTINUCLEAR ANTIBODIES (ANA): CPT | Performed by: STUDENT IN AN ORGANIZED HEALTH CARE EDUCATION/TRAINING PROGRAM

## 2024-07-15 PROCEDURE — 99223 1ST HOSP IP/OBS HIGH 75: CPT | Mod: ,,, | Performed by: INTERNAL MEDICINE

## 2024-07-15 PROCEDURE — 25000242 PHARM REV CODE 250 ALT 637 W/ HCPCS

## 2024-07-15 PROCEDURE — 82803 BLOOD GASES ANY COMBINATION: CPT

## 2024-07-15 PROCEDURE — C1751 CATH, INF, PER/CENT/MIDLINE: HCPCS

## 2024-07-15 PROCEDURE — 82800 BLOOD PH: CPT

## 2024-07-15 PROCEDURE — 25000003 PHARM REV CODE 250

## 2024-07-15 PROCEDURE — 83735 ASSAY OF MAGNESIUM: CPT

## 2024-07-15 PROCEDURE — 86225 DNA ANTIBODY NATIVE: CPT | Performed by: STUDENT IN AN ORGANIZED HEALTH CARE EDUCATION/TRAINING PROGRAM

## 2024-07-15 PROCEDURE — 99231 SBSQ HOSP IP/OBS SF/LOW 25: CPT | Mod: GC,,, | Performed by: INTERNAL MEDICINE

## 2024-07-15 PROCEDURE — 94660 CPAP INITIATION&MGMT: CPT

## 2024-07-15 PROCEDURE — 84100 ASSAY OF PHOSPHORUS: CPT

## 2024-07-15 PROCEDURE — 86038 ANTINUCLEAR ANTIBODIES: CPT | Performed by: STUDENT IN AN ORGANIZED HEALTH CARE EDUCATION/TRAINING PROGRAM

## 2024-07-15 PROCEDURE — 85025 COMPLETE CBC W/AUTO DIFF WBC: CPT

## 2024-07-15 PROCEDURE — 20000000 HC ICU ROOM

## 2024-07-15 PROCEDURE — 27100171 HC OXYGEN HIGH FLOW UP TO 24 HOURS

## 2024-07-15 PROCEDURE — 99900035 HC TECH TIME PER 15 MIN (STAT)

## 2024-07-15 PROCEDURE — 94761 N-INVAS EAR/PLS OXIMETRY MLT: CPT

## 2024-07-15 PROCEDURE — 94640 AIRWAY INHALATION TREATMENT: CPT

## 2024-07-15 PROCEDURE — 99223 1ST HOSP IP/OBS HIGH 75: CPT | Mod: GC,,, | Performed by: INTERNAL MEDICINE

## 2024-07-15 PROCEDURE — 80048 BASIC METABOLIC PNL TOTAL CA: CPT | Mod: XB | Performed by: INTERNAL MEDICINE

## 2024-07-15 PROCEDURE — 80053 COMPREHEN METABOLIC PANEL: CPT

## 2024-07-15 PROCEDURE — 94799 UNLISTED PULMONARY SVC/PX: CPT

## 2024-07-15 PROCEDURE — 85610 PROTHROMBIN TIME: CPT

## 2024-07-15 RX ORDER — IPRATROPIUM BROMIDE AND ALBUTEROL SULFATE 2.5; .5 MG/3ML; MG/3ML
3 SOLUTION RESPIRATORY (INHALATION) EVERY 8 HOURS
Status: DISCONTINUED | OUTPATIENT
Start: 2024-07-15 | End: 2024-07-15

## 2024-07-15 RX ORDER — POTASSIUM CHLORIDE 20 MEQ/1
40 TABLET, EXTENDED RELEASE ORAL ONCE
Status: COMPLETED | OUTPATIENT
Start: 2024-07-15 | End: 2024-07-15

## 2024-07-15 RX ORDER — ALBUTEROL SULFATE 90 UG/1
2 AEROSOL, METERED RESPIRATORY (INHALATION) EVERY 6 HOURS
Status: DISCONTINUED | OUTPATIENT
Start: 2024-07-15 | End: 2024-07-15

## 2024-07-15 RX ORDER — ALBUTEROL SULFATE 90 UG/1
2 AEROSOL, METERED RESPIRATORY (INHALATION) EVERY 6 HOURS
Status: DISCONTINUED | OUTPATIENT
Start: 2024-07-15 | End: 2024-07-26

## 2024-07-15 RX ADMIN — AMIODARONE HYDROCHLORIDE 0.5 MG/MIN: 1.8 INJECTION, SOLUTION INTRAVENOUS at 09:07

## 2024-07-15 RX ADMIN — PANTOPRAZOLE SODIUM 40 MG: 40 TABLET, DELAYED RELEASE ORAL at 01:07

## 2024-07-15 RX ADMIN — AMIODARONE HYDROCHLORIDE 0.5 MG/MIN: 1.8 INJECTION, SOLUTION INTRAVENOUS at 02:07

## 2024-07-15 RX ADMIN — ALBUTEROL SULFATE 2 PUFF: 108 INHALANT RESPIRATORY (INHALATION) at 08:07

## 2024-07-15 RX ADMIN — FUROSEMIDE 40 MG/HR: 10 INJECTION, SOLUTION INTRAMUSCULAR; INTRAVENOUS at 08:07

## 2024-07-15 RX ADMIN — ACETAMINOPHEN 650 MG: 325 TABLET ORAL at 05:07

## 2024-07-15 RX ADMIN — ALLOPURINOL 300 MG: 100 TABLET ORAL at 01:07

## 2024-07-15 RX ADMIN — MUPIROCIN: 20 OINTMENT TOPICAL at 01:07

## 2024-07-15 RX ADMIN — FLUTICASONE PROPIONATE AND SALMETEROL 1 PUFF: 50; 250 POWDER RESPIRATORY (INHALATION) at 08:07

## 2024-07-15 RX ADMIN — POTASSIUM CHLORIDE 40 MEQ: 1500 TABLET, EXTENDED RELEASE ORAL at 12:07

## 2024-07-15 RX ADMIN — FUROSEMIDE 40 MG/HR: 10 INJECTION, SOLUTION INTRAMUSCULAR; INTRAVENOUS at 12:07

## 2024-07-15 RX ADMIN — AMIODARONE HYDROCHLORIDE 0.5 MG/MIN: 1.8 INJECTION, SOLUTION INTRAVENOUS at 08:07

## 2024-07-15 RX ADMIN — DOBUTAMINE HYDROCHLORIDE 5 MCG/KG/MIN: 400 INJECTION INTRAVENOUS at 08:07

## 2024-07-15 RX ADMIN — POTASSIUM CHLORIDE 40 MEQ: 1500 TABLET, EXTENDED RELEASE ORAL at 08:07

## 2024-07-15 RX ADMIN — WARFARIN SODIUM 5 MG: 5 TABLET ORAL at 06:07

## 2024-07-15 RX ADMIN — DIGOXIN 250 MCG: 250 INJECTION, SOLUTION INTRAMUSCULAR; INTRAVENOUS at 12:07

## 2024-07-15 RX ADMIN — CHLOROTHIAZIDE SODIUM 250 MG: 500 INJECTION, POWDER, LYOPHILIZED, FOR SOLUTION INTRAVENOUS at 04:07

## 2024-07-15 RX ADMIN — MELATONIN TAB 3 MG 6 MG: 3 TAB at 08:07

## 2024-07-15 NOTE — PLAN OF CARE
[facility] - Cardiac Intensive Care  Discharge Reassessment    Primary Care Provider: Gianni Escalona MD    Expected Discharge Date: 7/22/2024    Reassessment (most recent)       Discharge Reassessment - 07/15/24 1345          Discharge Reassessment    Assessment Type Discharge Planning Reassessment     Did the patient's condition or plan change since previous assessment? No     Discharge Plan discussed with: Patient     Communicated ESTEBAN with patient/caregiver Date not available/Unable to determine     Discharge Plan A Home     Discharge Plan B Home Health     DME Needed Upon Discharge  none     Transition of Care Barriers None     Why the patient remains in the hospital Requires continued medical care                   Pt not stable for discharge at this time.  SW met with pt at bedside and answered all questions.  Discharge Plan A and Plan B have been determined by review of patient's clinical status, future medical and therapeutic needs, and coverage/benefits for post-acute care in coordination with multidisciplinary team members.  Will continue to follow.      Elisa Olivo LMSW  [facility]  [id number]

## 2024-07-15 NOTE — HPI
Yong Mcintyre is a 48 y.o. male with severe pulmonary HTN (Group 2-3, on 3L home O2, diagnosed prior to 2015, follows with Dr. Child at [facility], [place], Obesity hypoventilation syndrome, HFpEF, Paroxysmal AFib (on Coumadin), BMI > 35, HTN admitted on 7/5/24 for ADHF d/t worsening RV failure and LV dysfunction. EF March 2024 was 60%. New echo this admission 7/5/24 with newly reduced EF 35-40%, severe RV dysfunction. Patient was previously seen by [facility] in clinic for further evaluation however not deemed a candidate for advanced options given concerns for compliance issues. They recommended palliative at the time. Palliative team did see patient during hospitalization however patient would like everything done at this time. While in the hospital he has been started on  5 gtt and has been requiring lasix gtt @ 40 for diuresis. He remains on high flow NC @30-40%. On admission he was in sinus rhythm and went into an atypical AFL on 7/14/24. He was been on Warfarin for paroxysmal AF. INR was subtherapeutic earlier however has been therapeutic with INR >2 since he went into AFL. He was started on Amio gtt 7/14/24 with rates in the 130-140s.   EP consulted for atypical AFL.

## 2024-07-15 NOTE — ASSESSMENT & PLAN NOTE
Yong Mcintyre is a 48 y.o. male with severe pulmonary HTN (Group 2-3, on 3L home O2, diagnosed prior to 2015, follows with Dr. Child at [facility], [place], Obesity hypoventilation syndrome, HFpEF, Paroxysmal AFib (on Coumadin), BMI > 35, HTN admitted on 7/5/24 for ADHF d/t worsening RV failure and LV dysfunction. EF March 2024 was 60%. New echo this admission 7/5/24 with newly reduced EF 35-40%, severe RV dysfunction. Previously evaluated by HTS and deemed not a candidate for advanced options given concerns for compliance issues and recommended palliative. While in the hospital, pt started on  5 gtt and has been requiring lasix gtt @ 40 for diuresis. He remains on high flow NC @30-40%. On admission he was in sinus rhythm and went into an atypical AFL on 7/14/24. He was been on Warfarin for paroxysmal AF. INR was subtherapeutic earlier however has been therapeutic with INR >2 since he went into AFL. He was started on Amio gtt 7/14/24 with rates in the 130-140s.       Recommendations:  - continue warfarin for cardioembolic protection.   - NPO after midnight, plan for straight DCCV tomorrow. High risk for DAWIT + has had therapeutic INRs since converting to atypical AFL on 7/14/24  - ok to continue Amio gtt for rate control for now acknowledging risk of further lung injury given his underlying pulmonary disease (asthma/COPD/severe PH)

## 2024-07-15 NOTE — ASSESSMENT & PLAN NOTE
Acute on chronic. - NYHA class III symptoms with recurrent admissions.  - He was seen by hospitals outpatient 6/2024 who state patient was feeling better on new diuretic regimen however worried about the frequency of use of metolazone and his worsening kidney function.    - Admitted with CXR with cardiomegaly and pulmonary vascular congestion, suggestive of pulmonary edema secondary to CHF. BNP elevated at 800. Creatinine worse at 3.1 on admission.  - Given his recurrent HF admissions and most recent hemodynamics, hospitals believes his overall prognosis is poor and recommended palliative care consult. Palliative care evaluated patient. Patient wishes to continue full measures at this time.     - 2 gram sodium restriction and 1500cc fluid restriction.  - Encourage physical activity with graded exercise program.  - Continue central line for CVP monitoring  - Lasix increased to 80mg IV push and then gtt to 40  - Continue Acetazolamide PO  - Continue dobutamine gtt  - Diuril as needed  - Monitor lytes BID; replete as indicated

## 2024-07-15 NOTE — ASSESSMENT & PLAN NOTE
Yong Mcintyre is a 48 y.o. male with severe pulmonary HTN (Group 2-3, on 3L home O2, diagnosed prior to 2015, follows with Dr. Child at [facility], [place], Obesity hypoventilation syndrome, HFpEF, Paroxysmal AFib (on Coumadin), BMI > 35, HTN admitted on 7/5/24 for ADHF d/t worsening RV failure and LV dysfunction. EF March 2024 was 60%. New echo this admission 7/5/24 with newly reduced EF 35-40%, severe RV dysfunction. Previously evaluated by HTS and deemed not a candidate for advanced options given concerns for compliance issues and recommended palliative. While in the hospital, pt started on  5 gtt and has been requiring lasix gtt @ 40 for diuresis. He remains on high flow NC @30-40%. On admission he was in sinus rhythm and went into an atypical AFL on 7/14/24. He was been on Warfarin for paroxysmal AF. INR was subtherapeutic earlier however has been therapeutic with INR >2 since he went into AFL. He was started on Amio gtt 7/14/24 with rates in the 130-140s.       Recommendations:  - continue warfarin for cardioembolic protection.   - NPO after midnight, plan for straight DCCV tomorrow. High risk for DAWIT + has had therapeutic INRs since converting to atypical AFL on 7/14/24  - ok to continue Amio gtt for rate control for now acknowledging risk of further lung injury given his underlying pulmonary disease (asthma/COPD/severe PH)

## 2024-07-15 NOTE — SUBJECTIVE & OBJECTIVE
"Past Medical History:   Diagnosis Date    Arthritis     CHF (congestive heart failure)     Diastolic dysfunction    Cor pulmonale 11/05/2012    Gallstones     GERD (gastroesophageal reflux disease)     Hypertension     Morbid obesity 11/05/2012    Obesity hypoventilation syndrome     On home oxygen therapy 03/07/2014    MALLIKA (obstructive sleep apnea)     BPAP 16/11 with 3 L at night (continuous O2).    Paroxysmal atrial fibrillation     PFO (patent foramen ovale) 11/05/2012    status post closure    Pickwickian syndrome 11/05/2012    Pulmonary hypertension        Past Surgical History:   Procedure Laterality Date    CHOLECYSTECTOMY      RIGHT HEART CATHETERIZATION Right 03/22/2022    Procedure: INSERTION, CATHETER, RIGHT HEART;  Surgeon: Tony Martínez MD;  Location: [facility] CATH LAB;  Service: Cardiology;  Laterality: Right;    RIGHT HEART CATHETERIZATION Right 01/31/2024    Procedure: INSERTION, CATHETER, RIGHT HEART;  Surgeon: Sheri Ritter MD;  Location: [facility] CATH LAB;  Service: Cardiology;  Laterality: Right;    UPPER GASTROINTESTINAL ENDOSCOPY      2-3 years ago       Review of patient's allergies indicates:   Allergen Reactions    Lipitor [atorvastatin] Other (See Comments)     "it makes my legs feel like jelly"  Other reaction(s): causes legs to hurt       No current facility-administered medications on file prior to encounter.     Current Outpatient Medications on File Prior to Encounter   Medication Sig    albuterol (VENTOLIN HFA) 90 mcg/actuation inhaler Inhale 2 puffs into the lungs every 4 (four) hours as needed for Wheezing. Rescue    allopurinoL (ZYLOPRIM) 300 MG tablet Take 1 tablet (300 mg total) by mouth once daily.    metOLazone (ZAROXOLYN) 2.5 MG tablet Take 1 tablet (2.5 mg total) by mouth every Mon, Wed, Fri, Sun. Take 2.5 mg 4 times a week. In addition, if you gain 3lbs in a day or 5 lbs in three days, take an extra dose of metolazone regardless of day of the week    pantoprazole (PROTONIX) 40 " MG tablet Take 1 tablet (40 mg total) by mouth once daily.    potassium chloride (MICRO-K) 10 MEQ CpSR Take 10 mEq by mouth 2 (two) times daily. Take 2 capsules by mouth twice daily.    tiotropium (SPIRIVA) 18 mcg inhalation capsule Inhale 18 mcg into the lungs once daily.    torsemide (DEMADEX) 20 MG Tab Take 3 tablets (60 mg total) by mouth 2 (two) times a day.    warfarin (COUMADIN) 10 MG tablet Take 1/2 tablet (5 mg) by mouth daily or as directed by [facility]    WIXELA INHUB 250-50 mcg/dose diskus inhaler 1 puff 2 (two) times daily. USE 1 INHALATION BY MOUTH TWICE DAILY    acetaminophen (TYLENOL ARTHRITIS ORAL) Take 2 tablets by mouth daily as needed (pain).    ascorbic acid (VITAMIN C ORAL) Take 1 tablet by mouth once daily.    b complex vitamins tablet Take 1 tablet by mouth once daily.    CALCIUM ORAL Take 1 capsule by mouth once daily.    multivit,calc,min/FA/K1/lycop (ONE-A-DAY MEN'S COMPLETE ORAL) Take 1 tablet by mouth once daily.    [DISCONTINUED] sildenafil (REVATIO) 20 mg Tab Take 1 tablet (20 mg total) by mouth 3 (three) times daily.     Family History       Problem Relation (Age of Onset)    Arthritis Mother, Maternal Grandmother, Maternal Grandfather    Asthma Mother, Sister, Maternal Grandmother    Breast cancer Paternal Grandmother    Diabetes Maternal Grandmother    Down syndrome Brother    Gout Brother    Hypertension Father, Maternal Grandmother, Maternal Grandfather, Paternal Grandfather          Tobacco Use    Smoking status: Never    Smokeless tobacco: Never   Substance and Sexual Activity    Alcohol use: Yes     Comment: holidays  rare    Drug use: No    Sexual activity: Not Currently     Partners: Female     Review of Systems   Cardiovascular:  Positive for dyspnea on exertion, leg swelling and palpitations. Negative for chest pain and syncope.   Respiratory:  Positive for shortness of breath.    Neurological:  Negative for dizziness and focal weakness.   All other systems reviewed  and are negative.    Objective:     Vital Signs (Most Recent):  Temp: 99.1 °F (37.3 °C) (07/15/24 1500)  Pulse: (!) 131 (07/15/24 1518)  Resp: (!) 23 (07/15/24 1518)  BP: 95/60 (07/15/24 1500)  SpO2: (!) 89 % (07/15/24 1609) Vital Signs (24h Range):  Temp:  [97.3 °F (36.3 °C)-99.1 °F (37.3 °C)] 99.1 °F (37.3 °C)  Pulse:  [126-133] 131  Resp:  [13-52] 23  SpO2:  [81 %-99 %] 89 %  BP: ()/(54-78) 95/60       Weight: 101.5 kg (223 lb 12.3 oz)  Body mass index is 37.24 kg/m².    SpO2: (!) 89 %        Physical Exam  Constitutional:       General: He is in acute distress.      Appearance: He is obese. He is ill-appearing.   Cardiovascular:      Rate and Rhythm: Regular rhythm. Tachycardia present.   Pulmonary:      Effort: Respiratory distress present.      Breath sounds: Rhonchi present.   Abdominal:      General: Abdomen is flat.   Musculoskeletal:      Right lower leg: Edema present.      Left lower leg: Edema present.   Skin:     General: Skin is warm.   Neurological:      Mental Status: He is alert.            Significant Labs: All pertinent lab results from the last 24 hours have been reviewed.

## 2024-07-15 NOTE — ASSESSMENT & PLAN NOTE
On coumadin.    Lab Results   Component Value Date    INR 2.3 (H) 07/15/2024    INR 2.2 (H) 07/14/2024    INR 1.9 (H) 07/13/2024     -- Goal INR 2-3  -- Titrate warfarin daily

## 2024-07-15 NOTE — H&P
[facility] - Cardiac Intensive Care  Cardiac Electrophysiology  History and Physical     Admission Date: 7/4/2024  Code Status: Full Code   Attending Provider: Rossy Leary MD   Principal Problem:Atypical atrial flutter      Assessment and Plan:     * Atypical atrial flutter  Yong Mcintyre is a 48 y.o. male with severe pulmonary HTN (Group 2-3, on 3L home O2, diagnosed prior to 2015, follows with Dr. Child at [facility], [place], Obesity hypoventilation syndrome, HFpEF, Paroxysmal AFib (on Coumadin), BMI > 35, HTN admitted on 7/5/24 for ADHF d/t worsening RV failure and LV dysfunction. EF March 2024 was 60%. New echo this admission 7/5/24 with newly reduced EF 35-40%, severe RV dysfunction. Previously evaluated by HTS and deemed not a candidate for advanced options given concerns for compliance issues and recommended palliative. While in the hospital, pt started on  5 gtt and has been requiring lasix gtt @ 40 for diuresis. He remains on high flow NC @30-40%. On admission he was in sinus rhythm and went into an atypical AFL on 7/14/24. He was been on Warfarin for paroxysmal AF. INR was subtherapeutic earlier however has been therapeutic with INR >2 since he went into AFL. He was started on Amio gtt 7/14/24 with rates in the 130-140s.       Recommendations:  - continue warfarin for cardioembolic protection.   - NPO after midnight, plan for straight DCCV tomorrow. High risk for DAWIT + has had therapeutic INRs since converting to atypical AFL on 7/14/24  - ok to continue Amio gtt for rate control for now acknowledging risk of further lung injury given his underlying pulmonary disease (asthma/COPD/severe PH)      Urszula Ruby MD  Cardiac Electrophysiology  [facility] - Cardiac Intensive [facility]    Subjective:     Chief Complaint:  Atypical AFL     HPI:  Yong Mcintyre is a 48 y.o. male with severe pulmonary HTN (Group 2-3, on 3L home O2, diagnosed prior to 2015, follows with Dr. Child at [facility], [place],  Obesity hypoventilation syndrome, HFpEF, Paroxysmal AFib (on Coumadin), BMI > 35, HTN admitted on 7/5/24 for ADHF d/t worsening RV failure and LV dysfunction. EF March 2024 was 60%. New echo this admission 7/5/24 with newly reduced EF 35-40%, severe RV dysfunction. Patient was previously seen by \[facility]\"" in clinic for further evaluation however not deemed a candidate for advanced options given concerns for compliance issues. They recommended palliative at the time. Palliative team did see patient during hospitalization however patient would like everything done at this time. While in the hospital he has been started on  5 gtt and has been requiring lasix gtt @ 40 for diuresis. He remains on high flow NC @30-40%. On admission he was in sinus rhythm and went into an atypical AFL on 7/14/24. He was been on Warfarin for paroxysmal AF. INR was subtherapeutic earlier however has been therapeutic with INR >2 since he went into AFL. He was started on Amio gtt 7/14/24 with rates in the 130-140s.   EP consulted for atypical AFL.        Past Medical History:   Diagnosis Date    Arthritis     CHF (congestive heart failure)     Diastolic dysfunction    Cor pulmonale 11/05/2012    Gallstones     GERD (gastroesophageal reflux disease)     Hypertension     Morbid obesity 11/05/2012    Obesity hypoventilation syndrome     On home oxygen therapy 03/07/2014    MALLIKA (obstructive sleep apnea)     BPAP 16/11 with 3 L at night (continuous O2).    Paroxysmal atrial fibrillation     PFO (patent foramen ovale) 11/05/2012    status post closure    Pickwickian syndrome 11/05/2012    Pulmonary hypertension        Past Surgical History:   Procedure Laterality Date    CHOLECYSTECTOMY      RIGHT HEART CATHETERIZATION Right 03/22/2022    Procedure: INSERTION, CATHETER, RIGHT HEART;  Surgeon: Tony Martínez MD;  Location: [facility] CATH LAB;  Service: Cardiology;  Laterality: Right;    RIGHT HEART CATHETERIZATION Right 01/31/2024    Procedure: INSERTION,  "CATHETER, RIGHT HEART;  Surgeon: Sheri Ritter MD;  Location: [facility] CATH LAB;  Service: Cardiology;  Laterality: Right;    UPPER GASTROINTESTINAL ENDOSCOPY      2-3 years ago       Review of patient's allergies indicates:   Allergen Reactions    Lipitor [atorvastatin] Other (See Comments)     "it makes my legs feel like jelly"  Other reaction(s): causes legs to hurt       No current facility-administered medications on file prior to encounter.     Current Outpatient Medications on File Prior to Encounter   Medication Sig    albuterol (VENTOLIN HFA) 90 mcg/actuation inhaler Inhale 2 puffs into the lungs every 4 (four) hours as needed for Wheezing. Rescue    allopurinoL (ZYLOPRIM) 300 MG tablet Take 1 tablet (300 mg total) by mouth once daily.    metOLazone (ZAROXOLYN) 2.5 MG tablet Take 1 tablet (2.5 mg total) by mouth every Mon, Wed, Fri, Sun. Take 2.5 mg 4 times a week. In addition, if you gain 3lbs in a day or 5 lbs in three days, take an extra dose of metolazone regardless of day of the week    pantoprazole (PROTONIX) 40 MG tablet Take 1 tablet (40 mg total) by mouth once daily.    potassium chloride (MICRO-K) 10 MEQ CpSR Take 10 mEq by mouth 2 (two) times daily. Take 2 capsules by mouth twice daily.    tiotropium (SPIRIVA) 18 mcg inhalation capsule Inhale 18 mcg into the lungs once daily.    torsemide (DEMADEX) 20 MG Tab Take 3 tablets (60 mg total) by mouth 2 (two) times a day.    warfarin (COUMADIN) 10 MG tablet Take 1/2 tablet (5 mg) by mouth daily or as directed by Coumadin Clinic    WIXELA INHUB 250-50 mcg/dose diskus inhaler 1 puff 2 (two) times daily. USE 1 INHALATION BY MOUTH TWICE DAILY    acetaminophen (TYLENOL ARTHRITIS ORAL) Take 2 tablets by mouth daily as needed (pain).    ascorbic acid (VITAMIN C ORAL) Take 1 tablet by mouth once daily.    b complex vitamins tablet Take 1 tablet by mouth once daily.    CALCIUM ORAL Take 1 capsule by mouth once daily.    multivit,calc,min/FA/K1/lycop (ONE-A-DAY " MEN'S COMPLETE ORAL) Take 1 tablet by mouth once daily.    [DISCONTINUED] sildenafil (REVATIO) 20 mg Tab Take 1 tablet (20 mg total) by mouth 3 (three) times daily.     Family History       Problem Relation (Age of Onset)    Arthritis Mother, Maternal Grandmother, Maternal Grandfather    Asthma Mother, Sister, Maternal Grandmother    Breast cancer Paternal Grandmother    Diabetes Maternal Grandmother    Down syndrome Brother    Gout Brother    Hypertension Father, Maternal Grandmother, Maternal Grandfather, Paternal Grandfather          Tobacco Use    Smoking status: Never    Smokeless tobacco: Never   Substance and Sexual Activity    Alcohol use: Yes     Comment: holidays  rare    Drug use: No    Sexual activity: Not Currently     Partners: Female     Review of Systems   Cardiovascular:  Positive for dyspnea on exertion, leg swelling and palpitations. Negative for chest pain and syncope.   Respiratory:  Positive for shortness of breath.    Neurological:  Negative for dizziness and focal weakness.   All other systems reviewed and are negative.    Objective:     Vital Signs (Most Recent):  Temp: 99.1 °F (37.3 °C) (07/15/24 1500)  Pulse: (!) 131 (07/15/24 1518)  Resp: (!) 23 (07/15/24 1518)  BP: 95/60 (07/15/24 1500)  SpO2: (!) 89 % (07/15/24 1609) Vital Signs (24h Range):  Temp:  [97.3 °F (36.3 °C)-99.1 °F (37.3 °C)] 99.1 °F (37.3 °C)  Pulse:  [126-133] 131  Resp:  [13-52] 23  SpO2:  [81 %-99 %] 89 %  BP: ()/(54-78) 95/60       Weight: 101.5 kg (223 lb 12.3 oz)  Body mass index is 37.24 kg/m².    SpO2: (!) 89 %        Physical Exam  Constitutional:       General: He is in acute distress.      Appearance: He is obese. He is ill-appearing.   Cardiovascular:      Rate and Rhythm: Regular rhythm. Tachycardia present.   Pulmonary:      Effort: Respiratory distress present.      Breath sounds: Rhonchi present.   Abdominal:      General: Abdomen is flat.   Musculoskeletal:      Right lower leg: Edema present.       Left lower leg: Edema present.   Skin:     General: Skin is warm.   Neurological:      Mental Status: He is alert.            Significant Labs: All pertinent lab results from the last 24 hours have been reviewed.

## 2024-07-15 NOTE — ASSESSMENT & PLAN NOTE
Patient with a history of chronic CO2 retention, has labs with elevated bicarb for years (baseline appears upper 30's) on home NIV. Patient is adherent with home O2 and BiPAP. Per notes from [facility], there was some concern for inadequate ventilatory support at home due to worsening bicarb and Hgb, however patient has had multiple hospitalizations in the past calendar year alone for right heart failure. Most recent Echo confirming very high pulmonary artery systolic pressure, and some RV dysfunction but not severely dilating to the point of impeding left heart function.    Patient's hypercapnia us multifactorial. Baseline ventilatory defect is present (severe obstruction on prior PFTs in the [facility]) as well as sheer ventilatory restriction from significantly enlarged heart. Patient's baseline CO2 is hard to determine, but has been in the 70's with normal PH's on arterial blood gases. Patient has been appropriately aggressively diuresed during this hospitalization and may have developed a contraction alkalosis. It's likely some aspect of this increase in CO2 over the past few days is a compensatory response to his alkalosis. Has started diamox for assisted diuresis. Overall, patient has multiple severely morbid and high mortality conditions.     - Would continue diuresis and agree with diamox  - Patient will require BiPAP at night / with naps. He is mentating well, and well compensated at this time so can come off during the day as long as he is mentating well and pH is not decreasing.  - On bronchodilators already, would schedule duo nebs (or levalbuterol if concern for tachyarrthymia)  - Goal O2 sat is 88-92%. Patient has significant hypoxemia and requires significant support at this time. Could consider Echo with bubble study  - Prior CT with mosiacism and what could be cystic lung changes. Will send off autoimmune panel. Would like repeat CT however on too much support at this time.

## 2024-07-15 NOTE — ASSESSMENT & PLAN NOTE
Patient with severe PH, last RHC in 1/2024 with Wedge of 15 (24 with exercise) and PVR of 10. Was previously on Bosentan in the past but has been off for some time due to lack of indication. Seems WHO group 2-3 moreso per Dr. Child's last note.    Regardless, minimal data for efficacy on PH specific meds in group 2 and 3 disease. Could lead to worsening VQ mismatch / left heart overload and worsen his symptoms.

## 2024-07-15 NOTE — SUBJECTIVE & OBJECTIVE
Interval History: tachycardic, ep consulted.     ROS  Objective:     Vital Signs (Most Recent):  Temp: 99.1 °F (37.3 °C) (07/15/24 1500)  Pulse: (!) 131 (07/15/24 1518)  Resp: (!) 23 (07/15/24 1518)  BP: 95/60 (07/15/24 1500)  SpO2: (!) 89 % (07/15/24 1609) Vital Signs (24h Range):  Temp:  [97.3 °F (36.3 °C)-99.1 °F (37.3 °C)] 99.1 °F (37.3 °C)  Pulse:  [126-133] 131  Resp:  [13-52] 23  SpO2:  [81 %-99 %] 89 %  BP: ()/(54-78) 95/60     Weight: 101.5 kg (223 lb 12.3 oz)  Body mass index is 37.24 kg/m².     SpO2: (!) 89 %         Intake/Output Summary (Last 24 hours) at 7/15/2024 1730  Last data filed at 7/15/2024 1500  Gross per 24 hour   Intake 1255.51 ml   Output 3950 ml   Net -2694.49 ml       Lines/Drains/Airways       Central Venous Catheter Line  Duration             Percutaneous Central Line - Triple Lumen  07/08/24 1145 Internal Jugular Right 7 days              Drain  Duration             Male External Urinary Catheter 07/15/24 0400 Small <1 day              Peripheral Intravenous Line  Duration                  Peripheral IV - Single Lumen 07/12/24 1258 18 G 1 1/4 in No Distal;Left;Posterior Forearm 3 days                       Physical Exam  Vitals reviewed.   Constitutional:       General: He is in acute distress.      Appearance: He is obese. He is ill-appearing.   Cardiovascular:      Rate and Rhythm: Regular rhythm. Tachycardia present.   Pulmonary:      Effort: Respiratory distress present.      Breath sounds: Rhonchi present.   Abdominal:      General: Abdomen is flat.   Musculoskeletal:      Right lower leg: Edema present.      Left lower leg: Edema present.   Skin:     General: Skin is warm.   Neurological:      Mental Status: He is alert.            Significant Labs: All pertinent lab results from the last 24 hours have been reviewed.    Significant Imaging: Echocardiogram: 2D echo with color flow doppler:   Results for orders placed or performed during the hospital encounter of 04/13/16    2D echo with color flow doppler   Result Value Ref Range    EF + QEF 48 55 - 65    Diastolic Dysfunction No     Narrative    TEST DESCRIPTION       Aorta: The aortic root is normal in size, measuring 2.5 cm at sinotubular junction and 2.5 cm at Sinuses of Valsalva. The proximal ascending aorta is normal in size, measuring 2.5 cm across.     Left Atrium: The left atrial volume index is normal, measuring 19.38 cc/m2.     Left Ventricle: The left ventricle is upper limit of normal, with an end-diastolic diameter of 5.0 cm, and an end-systolic diameter of 4.2 cm. Posterior wall thickness is upper limit of normal in size, with the septum measuring 0.9 cm and the posterior   wall measuring 1.1 cm across. Relative wall thickness was increased at 0.44, and the LV mass index was 100.1 g/m2 consistent with concentric remodeling. The following segments were mildly hypokinetic: mid anteroseptum, apical septum, apical inferior   wall, mid inferior wall.  The following segments were moderately hypokinetic: mid inferoseptum, basal inferoseptum, basal inferior wall.  Global left ventricular systolic function appears mildly depressed. Visually estimated ejection fraction is 45-50%. The LV Doppler derived stroke volume equals 65.0 ccs. There is global hypokinesis.   The E/e'(lat) is 4, consistent with normal diastolic function.     Right Atrium: The right atrium is normal in size, measuring 4.2 cm in length and 3.2 cm in width in the apical view.     Right Ventricle: The right ventricle is normal in size measuring 3.0 cm at the base in the apical right ventricle-focused view. Global right ventricular systolic function appears low normal. There is abnormal septal motion. Tricuspid annular plane   systolic excursion (TAPSE) is 2.2 cm.     Aortic Valve:  The aortic valve is mildly sclerotic.     IVC: IVC is normal in size and collapses > 50% with a sniff, suggesting normal right atrial pressure of 3 mmHg.     Intracavitary: There is  no evidence of pericardial effusion, intracavity mass, thrombi, or vegetation.         CONCLUSIONS     1 - Upper limit of normal left ventricular enlargement.     2 - Mildly depressed left ventricular systolic function (EF 45-50%).     3 - Concentric remodeling.     4 - Normal left ventricular diastolic function.     5 - Low normal right ventricular systolic function .             This document has been electronically    SIGNED BY: Jian Soria MD On: 04/14/2016 11:35

## 2024-07-15 NOTE — PROGRESS NOTES
[facility] - Cardiac Intensive Care  Cardiology  Progress Note    Patient Name: Yong Mcintyre  MRN: [id number]  Admission Date: 7/4/2024  Hospital Length of Stay: 11 days  Code Status: Full Code   Attending Physician: Rossy Leary MD   Primary Care Physician: Gianni Escalona MD  Expected Discharge Date: 7/22/2024  Principal Problem:Atypical atrial flutter    Subjective:     Hospital Course:   The patient was admitted to the CCU for further management of right-sided heart failure. He was diuresed with a lasix gtt. Electrolytes were monitored and repleted as indicated. Palliative care was consulted given his poor prognosis. The patient had significant O2 requirements on admission which were slow to wean. A central line was placed for close CVP monitoring in the setting of aggressive diuresis. Nutrition consult placed for low salt diet education. Acetazolamide was added to his diuretic regimen due to persistent metabolic alkalosis. The patient required intermittent Bipap due to hypercapnic respiratory failure. Dobutamine was added to augment cardiac output to further diurese patient. The patient was in aflutter, EP was consulted. Plan for cardioversion 7/16/24. Pulm consulted for hypercapnia    Interval History: tachycardic, ep consulted.     ROS  Objective:     Vital Signs (Most Recent):  Temp: 99.1 °F (37.3 °C) (07/15/24 1500)  Pulse: (!) 131 (07/15/24 1518)  Resp: (!) 23 (07/15/24 1518)  BP: 95/60 (07/15/24 1500)  SpO2: (!) 89 % (07/15/24 1609) Vital Signs (24h Range):  Temp:  [97.3 °F (36.3 °C)-99.1 °F (37.3 °C)] 99.1 °F (37.3 °C)  Pulse:  [126-133] 131  Resp:  [13-52] 23  SpO2:  [81 %-99 %] 89 %  BP: ()/(54-78) 95/60     Weight: 101.5 kg (223 lb 12.3 oz)  Body mass index is 37.24 kg/m².     SpO2: (!) 89 %         Intake/Output Summary (Last 24 hours) at 7/15/2024 1730  Last data filed at 7/15/2024 1500  Gross per 24 hour   Intake 1255.51 ml   Output 3950 ml   Net -2694.49 ml        Lines/Drains/Airways       Central Venous Catheter Line  Duration             Percutaneous Central Line - Triple Lumen  07/08/24 1145 Internal Jugular Right 7 days              Drain  Duration             Male External Urinary Catheter 07/15/24 0400 Small <1 day              Peripheral Intravenous Line  Duration                  Peripheral IV - Single Lumen 07/12/24 1258 18 G 1 1/4 in No Distal;Left;Posterior Forearm 3 days                       Physical Exam  Vitals reviewed.   Constitutional:       General: He is in acute distress.      Appearance: He is obese. He is ill-appearing.   Cardiovascular:      Rate and Rhythm: Regular rhythm. Tachycardia present.   Pulmonary:      Effort: Respiratory distress present.      Breath sounds: Rhonchi present.   Abdominal:      General: Abdomen is flat.   Musculoskeletal:      Right lower leg: Edema present.      Left lower leg: Edema present.   Skin:     General: Skin is warm.   Neurological:      Mental Status: He is alert.            Significant Labs: All pertinent lab results from the last 24 hours have been reviewed.    Significant Imaging: Echocardiogram: 2D echo with color flow doppler:   Results for orders placed or performed during the hospital encounter of 04/13/16   2D echo with color flow doppler   Result Value Ref Range    EF + QEF 48 55 - 65    Diastolic Dysfunction No     Narrative    TEST DESCRIPTION       Aorta: The aortic root is normal in size, measuring 2.5 cm at sinotubular junction and 2.5 cm at Sinuses of Valsalva. The proximal ascending aorta is normal in size, measuring 2.5 cm across.     Left Atrium: The left atrial volume index is normal, measuring 19.38 cc/m2.     Left Ventricle: The left ventricle is upper limit of normal, with an end-diastolic diameter of 5.0 cm, and an end-systolic diameter of 4.2 cm. Posterior wall thickness is upper limit of normal in size, with the septum measuring 0.9 cm and the posterior   wall measuring 1.1 cm  across. Relative wall thickness was increased at 0.44, and the LV mass index was 100.1 g/m2 consistent with concentric remodeling. The following segments were mildly hypokinetic: mid anteroseptum, apical septum, apical inferior   wall, mid inferior wall.  The following segments were moderately hypokinetic: mid inferoseptum, basal inferoseptum, basal inferior wall.  Global left ventricular systolic function appears mildly depressed. Visually estimated ejection fraction is 45-50%. The LV Doppler derived stroke volume equals 65.0 ccs. There is global hypokinesis.   The E/e'(lat) is 4, consistent with normal diastolic function.     Right Atrium: The right atrium is normal in size, measuring 4.2 cm in length and 3.2 cm in width in the apical view.     Right Ventricle: The right ventricle is normal in size measuring 3.0 cm at the base in the apical right ventricle-focused view. Global right ventricular systolic function appears low normal. There is abnormal septal motion. Tricuspid annular plane   systolic excursion (TAPSE) is 2.2 cm.     Aortic Valve:  The aortic valve is mildly sclerotic.     IVC: IVC is normal in size and collapses > 50% with a sniff, suggesting normal right atrial pressure of 3 mmHg.     Intracavitary: There is no evidence of pericardial effusion, intracavity mass, thrombi, or vegetation.         CONCLUSIONS     1 - Upper limit of normal left ventricular enlargement.     2 - Mildly depressed left ventricular systolic function (EF 45-50%).     3 - Concentric remodeling.     4 - Normal left ventricular diastolic function.     5 - Low normal right ventricular systolic function .             This document has been electronically    SIGNED BY: Jian Soria MD On: 04/14/2016 11:35     Assessment and Plan:       * Atypical atrial flutter  - New onset  - On warfarin for Afib  - Patient on warfarin but has been subtherapeutic, will need to assess risk of cardioversion vs benefit to avoid HF in a patient with HF  and tachycardia    - Continue AC as per Afib  - Started Amiodarone for rhythm control, and will add digoxin for rate control  - Monitor electrolytes  -continued in Aflutter consulted EP      Metabolic alkalosis  Bicarb 40's in setting of aggressive diuresis. Suspect contraction alkalosis.    - Continue diamox  - Trend Bicarb    Acute on chronic respiratory failure with hypoxia  Patient with Hypercapnic and Hypoxic Respiratory failure which is Acute on chronic.  he is on home oxygen at 3 LPM. Supplemental oxygen was provided and noted- Oxygen Concentration (%):  [60-70] 65    Signs/symptoms of respiratory failure include- tachypnea and increased work of breathing. Contributing diagnoses includes - CHF Labs and images were reviewed. Patient Has recent ABG, which has been reviewed.    -- Trend VBG BID  -- BiPap during day as tolerated; okay for HFNC w/ meals  -- Goal SpO2 > 88%    Acute on chronic right-sided congestive heart failure  - See 'RV dysfunction'    Paroxysmal A-fib  On coumadin.    Lab Results   Component Value Date    INR 2.3 (H) 07/15/2024    INR 2.2 (H) 07/14/2024    INR 1.9 (H) 07/13/2024     -- Goal INR 2-3  -- Titrate warfarin daily      Right ventricular dysfunction  Acute on chronic. - NYHA class III symptoms with recurrent admissions.  - He was seen by [facility] outpatient 6/2024 who state patient was feeling better on new diuretic regimen however worried about the frequency of use of metolazone and his worsening kidney function.    - Admitted with CXR with cardiomegaly and pulmonary vascular congestion, suggestive of pulmonary edema secondary to CHF. BNP elevated at 800. Creatinine worse at 3.1 on admission.  - Given his recurrent HF admissions and most recent hemodynamics, [facility] believes his overall prognosis is poor and recommended palliative care consult. Palliative care evaluated patient. Patient wishes to continue full measures at this time.     - 2 gram sodium restriction and 1500cc fluid  restriction.  - Encourage physical activity with graded exercise program.  - Continue central line for CVP monitoring  - Lasix increased to 80mg IV push and then gtt to 40  - Continue Acetazolamide PO  - Continue dobutamine gtt  - Diuril as needed  - Monitor lytes BID; replete as indicated    MALLIKA (obstructive sleep apnea)  See 'hypoventilation syndrome'    Pulmonary hypertension  WHO group 2-3 per his managing pulmonologist (Danyell at [facility])  Adherent with diet, CPAP, and on continuous O2 3L at home.     -- RV dysfunction treatment as above    Hypoventilation syndrome  BIPAP QHS; HFNC during meals.  Significant hypercapnia w/ pCO2 80-90; will start daytime BiPap    - BIPAP for ventilation preferred over high O2          VTE Risk Mitigation (From admission, onward)           Ordered     warfarin (COUMADIN) tablet 5 mg  Daily         07/12/24 0751     IP VTE HIGH RISK PATIENT  Once         07/04/24 2302     Place sequential compression device  Until discontinued         07/04/24 2302                    Sheeba Baum DO  Cardiology  [facility] - Cardiac Intensive Care

## 2024-07-15 NOTE — HPI
48 year old with history of severe PH (Group 2-3) MALLIKA, HFpEF, and A.fib who presented to [facility] on 7/5 with complaints of shortness of breath lower extremity edema. The patient is on 3L home O2 and takes 3x weekly metolazone at home in addition to torsemide 60mg twice a day. The patient was was admitted to the CCU for right heart failure and diuresed with a lasix drip. He was requiring 40L/70% FiO2 on admission. He has remained with a significant O2 requirement since admission. The patient had an episode of confusion and somnolence a few days ago and pCO2 was in the 80's at that time. Suspicion for hypercapnic respiratory failure. He has consistently had Co2's in the 80's this admission. Pulmonology was consulted for hypercapnic respiratory failure.

## 2024-07-15 NOTE — SUBJECTIVE & OBJECTIVE
"Past Medical History:   Diagnosis Date    Arthritis     CHF (congestive heart failure)     Diastolic dysfunction    Cor pulmonale 11/05/2012    Gallstones     GERD (gastroesophageal reflux disease)     Hypertension     Morbid obesity 11/05/2012    Obesity hypoventilation syndrome     On home oxygen therapy 03/07/2014    MALLIKA (obstructive sleep apnea)     BPAP 16/11 with 3 L at night (continuous O2).    Paroxysmal atrial fibrillation     PFO (patent foramen ovale) 11/05/2012    status post closure    Pickwickian syndrome 11/05/2012    Pulmonary hypertension        Past Surgical History:   Procedure Laterality Date    CHOLECYSTECTOMY      RIGHT HEART CATHETERIZATION Right 03/22/2022    Procedure: INSERTION, CATHETER, RIGHT HEART;  Surgeon: Tony Martínez MD;  Location: [facility] CATH LAB;  Service: Cardiology;  Laterality: Right;    RIGHT HEART CATHETERIZATION Right 01/31/2024    Procedure: INSERTION, CATHETER, RIGHT HEART;  Surgeon: Sheri Ritter MD;  Location: [facility] CATH LAB;  Service: Cardiology;  Laterality: Right;    UPPER GASTROINTESTINAL ENDOSCOPY      2-3 years ago       Review of patient's allergies indicates:   Allergen Reactions    Lipitor [atorvastatin] Other (See Comments)     "it makes my legs feel like jelly"  Other reaction(s): causes legs to hurt       Family History       Problem Relation (Age of Onset)    Arthritis Mother, Maternal Grandmother, Maternal Grandfather    Asthma Mother, Sister, Maternal Grandmother    Breast cancer Paternal Grandmother    Diabetes Maternal Grandmother    Down syndrome Brother    Gout Brother    Hypertension Father, Maternal Grandmother, Maternal Grandfather, Paternal Grandfather          Tobacco Use    Smoking status: Never    Smokeless tobacco: Never   Substance and Sexual Activity    Alcohol use: Yes     Comment: holidays  rare    Drug use: No    Sexual activity: Not Currently     Partners: Female         Review of Systems   All other systems reviewed and are " negative.    Objective:     Vital Signs (Most Recent):  Temp: 97.9 °F (36.6 °C) (07/15/24 1200)  Pulse: (!) 128 (07/15/24 1400)  Resp: 17 (07/15/24 1400)  BP: 107/64 (07/15/24 1400)  SpO2: (!) 88 % (07/15/24 1518) Vital Signs (24h Range):  Temp:  [97.3 °F (36.3 °C)-97.9 °F (36.6 °C)] 97.9 °F (36.6 °C)  Pulse:  [126-133] 128  Resp:  [13-52] 17  SpO2:  [81 %-99 %] 88 %  BP: ()/(54-78) 107/64     Weight: 101.5 kg (223 lb 12.3 oz)  Body mass index is 37.24 kg/m².      Intake/Output Summary (Last 24 hours) at 7/15/2024 1544  Last data filed at 7/15/2024 1400  Gross per 24 hour   Intake 1296.16 ml   Output 4200 ml   Net -2903.84 ml        Physical Exam  Vitals and nursing note reviewed.   Constitutional:       General: He is not in acute distress.     Appearance: He is not toxic-appearing.      Interventions: Nasal cannula in place.      Comments: Comfort flow on at 35 L 45 %   HENT:      Head: Normocephalic and atraumatic.      Nose: Congestion present.      Mouth/Throat:      Mouth: Mucous membranes are moist.   Eyes:      General: No scleral icterus.     Pupils: Pupils are equal, round, and reactive to light.   Neck:      Comments: Central line in place, dressing clean and intact  Cardiovascular:      Rate and Rhythm: Tachycardia present. Rhythm irregular.      Heart sounds: Normal heart sounds.   Pulmonary:      Effort: No respiratory distress.      Breath sounds: Rales present. No wheezing.      Comments: Bibasilar crackles.  Abdominal:      General: There is distension.      Palpations: Abdomen is soft.      Tenderness: There is no abdominal tenderness. There is no guarding.   Musculoskeletal:      Right lower leg: Edema present.      Left lower leg: Edema present.   Skin:     General: Skin is warm and dry.   Neurological:      Mental Status: He is alert and oriented to person, place, and time.   Psychiatric:         Mood and Affect: Mood normal.         Behavior: Behavior normal. Behavior is cooperative.           Vents:  Oxygen Concentration (%): 60 (07/15/24 1518)    Lines/Drains/Airways       Central Venous Catheter Line  Duration             Percutaneous Central Line - Triple Lumen  07/08/24 1145 Internal Jugular Right 7 days              Drain  Duration             Male External Urinary Catheter 07/15/24 0400 Small <1 day              Peripheral Intravenous Line  Duration                  Peripheral IV - Single Lumen 07/12/24 1258 18 G 1 1/4 in No Distal;Left;Posterior Forearm 3 days                    Significant Labs:    CBC/Anemia Profile:  Recent Labs   Lab 07/14/24  0337 07/15/24  0352   WBC 5.94 6.16   HGB 12.2* 13.3*   HCT 46.9 49.0    166   MCV 91 89   RDW 21.3* 21.6*        Chemistries:  Recent Labs   Lab 07/14/24  0337 07/14/24  1046 07/14/24  1800 07/15/24  0352 07/15/24  1218    139  --  141 140   K 2.9* 4.0 3.3* 4.0 3.6   CL 93* 93*  --  94* 93*   CO2 38* 34*  --  37* 38*   BUN 67* 63*  --  59* 57*   CREATININE 2.0* 1.9*  --  2.0* 1.9*   CALCIUM 9.7 9.8  --  9.9 9.8   ALBUMIN 2.6*  --   --  2.8*  --    PROT 7.4  --   --  8.1  --    BILITOT 0.9  --   --  0.8  --    ALKPHOS 132  --   --  145*  --    ALT 11  --   --  11  --    AST 18  --   --  19  --    MG 1.8  --  2.0 2.0  --    PHOS 4.2  --   --  4.3  --        All pertinent labs within the past 24 hours have been reviewed.    Significant Imaging:   I have reviewed all pertinent imaging results/findings within the past 24 hours.    X-Ray Chest AP Portable  Narrative: EXAMINATION:  XR CHEST AP PORTABLE    CLINICAL HISTORY:  Heart failure;    TECHNIQUE:  Single frontal view of the chest was performed.    COMPARISON:  Prior dated 07/12/2024    FINDINGS:  There is a right-sided IJ catheter with tip projecting over the SVC.  The mediastinal structures are midline.  The cardiac silhouette is enlarged and stable.  There is pulmonary vascular congestion and bilateral extensive ground-glass opacity favored to reflect edema but nonspecific.   Findings are unchanged.  Impression: Stable enlarged cardiac silhouette and bilateral ground-glass opacities favored to reflect edema.    Electronically signed by: Soniya Singer MD  Date:    07/13/2024  Time:    09:43

## 2024-07-15 NOTE — ASSESSMENT & PLAN NOTE
Denied    Too Soon    Last RX written: 3-10-22  90 days Patient with Hypercapnic and Hypoxic Respiratory failure which is Acute on chronic.  he is on home oxygen at 3 LPM. Supplemental oxygen was provided and noted- Oxygen Concentration (%):  [60-70] 65    Signs/symptoms of respiratory failure include- tachypnea and increased work of breathing. Contributing diagnoses includes - CHF Labs and images were reviewed. Patient Has recent ABG, which has been reviewed.    -- Trend VBG BID  -- BiPap during day as tolerated; okay for HFNC w/ meals  -- Goal SpO2 > 88%

## 2024-07-15 NOTE — ASSESSMENT & PLAN NOTE
WHO group 2-3 per his managing pulmonologist (Danyell at [facility])  Adherent with diet, CPAP, and on continuous O2 3L at home.     -- RV dysfunction treatment as above

## 2024-07-15 NOTE — PLAN OF CARE
CICU DAILY GOALS       A: Awake    RASS: Goal - RASS Goal: 0-->alert and calm  Actual - RASS (Carter Agitation-Sedation Scale): alert and calm   Restraint necessity:    B: Breath   SBT: Not intubated   C: Coordinate A & B, analgesics/sedatives   Pain: managed    SAT: Not intubated  D: Delirium   CAM-ICU: Overall CAM-ICU: Negative  E: Early(intubated/ Progressive (non-intubated) Mobility   MOVE Screen: Pass   Activity: Activity Management: Rolling - L1  FAS: Feeding/Nutrition   Diet order: Diet/Nutrition Received: 2 gram sodium,   Fluid restriction: Fluid Requirement: 1500 fluid restriction   Nutritional Supplement Intake: Quantity 0, Type:  None  T: Thrombus   DVT prophylaxis: VTE Required Core Measure: Pharmacological prophylaxis initiated/maintained  H: HOB Elevation   Head of Bed (HOB) Positioning: HOB at 45 degrees  U: Ulcer Prophylaxis   GI: yes  G: Glucose control   managed      S: Skin   Nurses Note -- 4 Eyes  Skin assessed during: Q Shift Change   CHOOSE ONE:  [] No Altered Skin Integrity Present      []Prevention Measures Documented    [x] Yes- Altered Skin Integrity Present or Discovered     [x] LDA Added if Not in Epic (Describe Wound)     [] New Altered Skin Integrity was Present on Admit and Documented in LDA     [] Wound Image Taken  Wound Care Consulted? No  Attending Nurse:  Nayla Natarajan RN/Staff Member:  ADRIEL Winslow    B: Bowel Function   constipation   I: Indwelling Catheters   [place] necessity:     CVC necessity: Yes   IPAD offered: Not appropriate  D: De-escalation Antibx   No  Plan for the day   Continuous BiPAP except while eating. Diuril given for up-trending CVPs.  Family/Goals of care/Code Status   Code Status: Full Code     No acute events throughout day, VS and assessment per flow sheet, patient progressing towards goals as tolerated, plan of care reviewed with Yong Mcintyre and family, all concerns addressed, will continue to monitor.   Problem: Adult Inpatient Plan of Care  Goal: Plan of  Care Review  Outcome: Progressing

## 2024-07-15 NOTE — SUBJECTIVE & OBJECTIVE
"Past Medical History:   Diagnosis Date    Arthritis     CHF (congestive heart failure)     Diastolic dysfunction    Cor pulmonale 11/05/2012    Gallstones     GERD (gastroesophageal reflux disease)     Hypertension     Morbid obesity 11/05/2012    Obesity hypoventilation syndrome     On home oxygen therapy 03/07/2014    MALLIKA (obstructive sleep apnea)     BPAP 16/11 with 3 L at night (continuous O2).    Paroxysmal atrial fibrillation     PFO (patent foramen ovale) 11/05/2012    status post closure    Pickwickian syndrome 11/05/2012    Pulmonary hypertension        Past Surgical History:   Procedure Laterality Date    CHOLECYSTECTOMY      RIGHT HEART CATHETERIZATION Right 03/22/2022    Procedure: INSERTION, CATHETER, RIGHT HEART;  Surgeon: Tony Martínez MD;  Location: [facility] CATH LAB;  Service: Cardiology;  Laterality: Right;    RIGHT HEART CATHETERIZATION Right 01/31/2024    Procedure: INSERTION, CATHETER, RIGHT HEART;  Surgeon: Sheri Ritter MD;  Location: [facility] CATH LAB;  Service: Cardiology;  Laterality: Right;    UPPER GASTROINTESTINAL ENDOSCOPY      2-3 years ago       Review of patient's allergies indicates:   Allergen Reactions    Lipitor [atorvastatin] Other (See Comments)     "it makes my legs feel like jelly"  Other reaction(s): causes legs to hurt       No current facility-administered medications on file prior to encounter.     Current Outpatient Medications on File Prior to Encounter   Medication Sig    albuterol (VENTOLIN HFA) 90 mcg/actuation inhaler Inhale 2 puffs into the lungs every 4 (four) hours as needed for Wheezing. Rescue    allopurinoL (ZYLOPRIM) 300 MG tablet Take 1 tablet (300 mg total) by mouth once daily.    metOLazone (ZAROXOLYN) 2.5 MG tablet Take 1 tablet (2.5 mg total) by mouth every Mon, Wed, Fri, Sun. Take 2.5 mg 4 times a week. In addition, if you gain 3lbs in a day or 5 lbs in three days, take an extra dose of metolazone regardless of day of the week    pantoprazole (PROTONIX) 40 " MG tablet Take 1 tablet (40 mg total) by mouth once daily.    potassium chloride (MICRO-K) 10 MEQ CpSR Take 10 mEq by mouth 2 (two) times daily. Take 2 capsules by mouth twice daily.    tiotropium (SPIRIVA) 18 mcg inhalation capsule Inhale 18 mcg into the lungs once daily.    torsemide (DEMADEX) 20 MG Tab Take 3 tablets (60 mg total) by mouth 2 (two) times a day.    warfarin (COUMADIN) 10 MG tablet Take 1/2 tablet (5 mg) by mouth daily or as directed by [facility]    WIXELA INHUB 250-50 mcg/dose diskus inhaler 1 puff 2 (two) times daily. USE 1 INHALATION BY MOUTH TWICE DAILY    acetaminophen (TYLENOL ARTHRITIS ORAL) Take 2 tablets by mouth daily as needed (pain).    ascorbic acid (VITAMIN C ORAL) Take 1 tablet by mouth once daily.    b complex vitamins tablet Take 1 tablet by mouth once daily.    CALCIUM ORAL Take 1 capsule by mouth once daily.    multivit,calc,min/FA/K1/lycop (ONE-A-DAY MEN'S COMPLETE ORAL) Take 1 tablet by mouth once daily.    [DISCONTINUED] sildenafil (REVATIO) 20 mg Tab Take 1 tablet (20 mg total) by mouth 3 (three) times daily.     Family History       Problem Relation (Age of Onset)    Arthritis Mother, Maternal Grandmother, Maternal Grandfather    Asthma Mother, Sister, Maternal Grandmother    Breast cancer Paternal Grandmother    Diabetes Maternal Grandmother    Down syndrome Brother    Gout Brother    Hypertension Father, Maternal Grandmother, Maternal Grandfather, Paternal Grandfather          Tobacco Use    Smoking status: Never    Smokeless tobacco: Never   Substance and Sexual Activity    Alcohol use: Yes     Comment: holidays  rare    Drug use: No    Sexual activity: Not Currently     Partners: Female     Review of Systems   Cardiovascular:  Positive for dyspnea on exertion, leg swelling and palpitations. Negative for chest pain and syncope.   Respiratory:  Positive for shortness of breath.    Neurological:  Negative for dizziness and focal weakness.   All other systems reviewed  and are negative.    Objective:     Vital Signs (Most Recent):  Temp: 99.1 °F (37.3 °C) (07/15/24 1500)  Pulse: (!) 131 (07/15/24 1518)  Resp: (!) 23 (07/15/24 1518)  BP: 95/60 (07/15/24 1500)  SpO2: (!) 89 % (07/15/24 1609) Vital Signs (24h Range):  Temp:  [97.3 °F (36.3 °C)-99.1 °F (37.3 °C)] 99.1 °F (37.3 °C)  Pulse:  [126-133] 131  Resp:  [13-52] 23  SpO2:  [81 %-99 %] 89 %  BP: ()/(54-78) 95/60       Weight: 101.5 kg (223 lb 12.3 oz)  Body mass index is 37.24 kg/m².    SpO2: (!) 89 %        Physical Exam  Vitals reviewed.   Constitutional:       General: He is in acute distress.      Appearance: He is obese. He is ill-appearing.   Cardiovascular:      Rate and Rhythm: Regular rhythm. Tachycardia present.   Pulmonary:      Effort: Respiratory distress present.      Breath sounds: Rhonchi present.   Abdominal:      General: Abdomen is flat.   Musculoskeletal:      Right lower leg: Edema present.      Left lower leg: Edema present.   Skin:     General: Skin is warm.   Neurological:      Mental Status: He is alert.            Significant Labs: All pertinent lab results from the last 24 hours have been reviewed.

## 2024-07-15 NOTE — CONSULTS
[facility] - Cardiac Intensive [facility]  Cardiac Electrophysiology  Consult Note    Admission Date: 7/4/2024  Code Status: Full Code   Attending Provider: Rossy Leary MD  Consulting Provider: Urszula Ruby MD  Principal Problem:Atypical atrial flutter    Inpatient consult to Electrophysiology  Consult performed by: Urszula Ruby MD  Consult ordered by: Sheeba Baum DO  Reason for consult: AFL            Assessment and Plan:     * Atypical atrial flutter  Yong Mcintyre is a 48 y.o. male with severe pulmonary HTN (Group 2-3, on 3L home O2, diagnosed prior to 2015, follows with Dr. Child at [facility], MALLIKA, Obesity hypoventilation syndrome, HFpEF, Paroxysmal AFib (on Coumadin), BMI > 35, HTN admitted on 7/5/24 for ADHF d/t worsening RV failure and LV dysfunction. EF March 2024 was 60%. New echo this admission 7/5/24 with newly reduced EF 35-40%, severe RV dysfunction. Previously evaluated by HTS and deemed not a candidate for advanced options given concerns for compliance issues and recommended palliative. While in the hospital, pt started on  5 gtt and has been requiring lasix gtt @ 40 for diuresis. He remains on high flow NC @30-40%. On admission he was in sinus rhythm and went into an atypical AFL on 7/14/24. He was been on Warfarin for paroxysmal AF. INR was subtherapeutic earlier however has been therapeutic with INR >2 since he went into AFL. He was started on Amio gtt 7/14/24 with rates in the 130-140s.       Recommendations:  - continue warfarin for cardioembolic protection.   - NPO after midnight, plan for straight DCCV tomorrow. High risk for DAWIT + has had therapeutic INRs since converting to atypical AFL on 7/14/24  - ok to continue Amio gtt for rate control for now acknowledging risk of further lung injury given his underlying pulmonary disease (asthma/COPD/severe PH)    Thank you for your consult. I will follow-up with patient. Please contact us if you have any additional  questions.    Urszula Ruby MD  Cardiac Electrophysiology  [facility] - Cardiac Int[facility]    Subjective:     Chief Complaint:  ATYPICAL AFL    HPI:   Yong Mcintyre is a 48 y.o. male with severe pulmonary HTN (Group 2-3, on 3L home O2, diagnosed prior to 2015, follows with Dr. Child at [facility], MALLIKA, Obesity hypoventilation syndrome, HFpEF, Paroxysmal AFib (on Coumadin), BMI > 35, HTN admitted on 7/5/24 for ADHF d/t worsening RV failure and LV dysfunction. EF March 2024 was 60%. New echo this admission 7/5/24 with newly reduced EF 35-40%, severe RV dysfunction. Patient was previously seen by hospitals in clinic for further evaluation however not deemed a candidate for advanced options given concerns for compliance issues. They recommended palliative at the time. Palliative team did see patient during hospitalization however patient would like everything done at this time. While in the hospital he has been started on  5 gtt and has been requiring lasix gtt @ 40 for diuresis. He remains on high flow NC @30-40%. On admission he was in sinus rhythm and went into an atypical AFL on 7/14/24. He was been on Warfarin for paroxysmal AF. INR was subtherapeutic earlier however has been therapeutic with INR >2 since he went into AFL. He was started on Amio gtt 7/14/24 with rates in the 130-140s.   EP consulted for atypical AFL.        Past Medical History:   Diagnosis Date    Arthritis     CHF (congestive heart failure)     Diastolic dysfunction    Cor pulmonale 11/05/2012    Gallstones     GERD (gastroesophageal reflux disease)     Hypertension     Morbid obesity 11/05/2012    Obesity hypoventilation syndrome     On home oxygen therapy 03/07/2014    MALLIKA (obstructive sleep apnea)     BPAP 16/11 with 3 L at night (continuous O2).    Paroxysmal atrial fibrillation     PFO (patent foramen ovale) 11/05/2012    status post closure    Pickwickian syndrome 11/05/2012    Pulmonary hypertension        Past Surgical History:  "  Procedure Laterality Date    CHOLECYSTECTOMY      RIGHT HEART CATHETERIZATION Right 03/22/2022    Procedure: INSERTION, CATHETER, RIGHT HEART;  Surgeon: Tony Martínez MD;  Location: [facility] CATH LAB;  Service: Cardiology;  Laterality: Right;    RIGHT HEART CATHETERIZATION Right 01/31/2024    Procedure: INSERTION, CATHETER, RIGHT HEART;  Surgeon: Sheri Ritter MD;  Location: [facility] CATH LAB;  Service: Cardiology;  Laterality: Right;    UPPER GASTROINTESTINAL ENDOSCOPY      2-3 years ago       Review of patient's allergies indicates:   Allergen Reactions    Lipitor [atorvastatin] Other (See Comments)     "it makes my legs feel like jelly"  Other reaction(s): causes legs to hurt       No current facility-administered medications on file prior to encounter.     Current Outpatient Medications on File Prior to Encounter   Medication Sig    albuterol (VENTOLIN HFA) 90 mcg/actuation inhaler Inhale 2 puffs into the lungs every 4 (four) hours as needed for Wheezing. Rescue    allopurinoL (ZYLOPRIM) 300 MG tablet Take 1 tablet (300 mg total) by mouth once daily.    metOLazone (ZAROXOLYN) 2.5 MG tablet Take 1 tablet (2.5 mg total) by mouth every Mon, Wed, Fri, Sun. Take 2.5 mg 4 times a week. In addition, if you gain 3lbs in a day or 5 lbs in three days, take an extra dose of metolazone regardless of day of the week    pantoprazole (PROTONIX) 40 MG tablet Take 1 tablet (40 mg total) by mouth once daily.    potassium chloride (MICRO-K) 10 MEQ CpSR Take 10 mEq by mouth 2 (two) times daily. Take 2 capsules by mouth twice daily.    tiotropium (SPIRIVA) 18 mcg inhalation capsule Inhale 18 mcg into the lungs once daily.    torsemide (DEMADEX) 20 MG Tab Take 3 tablets (60 mg total) by mouth 2 (two) times a day.    warfarin (COUMADIN) 10 MG tablet Take 1/2 tablet (5 mg) by mouth daily or as directed by [facility]    WIXELA INHUB 250-50 mcg/dose diskus inhaler 1 puff 2 (two) times daily. USE 1 INHALATION BY MOUTH TWICE DAILY    " acetaminophen (TYLENOL ARTHRITIS ORAL) Take 2 tablets by mouth daily as needed (pain).    ascorbic acid (VITAMIN C ORAL) Take 1 tablet by mouth once daily.    b complex vitamins tablet Take 1 tablet by mouth once daily.    CALCIUM ORAL Take 1 capsule by mouth once daily.    multivit,calc,min/FA/K1/lycop (ONE-A-DAY MEN'S COMPLETE ORAL) Take 1 tablet by mouth once daily.    [DISCONTINUED] sildenafil (REVATIO) 20 mg Tab Take 1 tablet (20 mg total) by mouth 3 (three) times daily.     Family History       Problem Relation (Age of Onset)    Arthritis Mother, Maternal Grandmother, Maternal Grandfather    Asthma Mother, Sister, Maternal Grandmother    Breast cancer Paternal Grandmother    Diabetes Maternal Grandmother    Down syndrome Brother    Gout Brother    Hypertension Father, Maternal Grandmother, Maternal Grandfather, Paternal Grandfather          Tobacco Use    Smoking status: Never    Smokeless tobacco: Never   Substance and Sexual Activity    Alcohol use: Yes     Comment: holidays  rare    Drug use: No    Sexual activity: Not Currently     Partners: Female     Review of Systems   Cardiovascular:  Positive for dyspnea on exertion, leg swelling and palpitations. Negative for chest pain and syncope.   Respiratory:  Positive for shortness of breath.    Neurological:  Negative for dizziness and focal weakness.   All other systems reviewed and are negative.    Objective:     Vital Signs (Most Recent):  Temp: 99.1 °F (37.3 °C) (07/15/24 1500)  Pulse: (!) 131 (07/15/24 1518)  Resp: (!) 23 (07/15/24 1518)  BP: 95/60 (07/15/24 1500)  SpO2: (!) 89 % (07/15/24 1609) Vital Signs (24h Range):  Temp:  [97.3 °F (36.3 °C)-99.1 °F (37.3 °C)] 99.1 °F (37.3 °C)  Pulse:  [126-133] 131  Resp:  [13-52] 23  SpO2:  [81 %-99 %] 89 %  BP: ()/(54-78) 95/60       Weight: 101.5 kg (223 lb 12.3 oz)  Body mass index is 37.24 kg/m².    SpO2: (!) 89 %        Physical Exam  Vitals reviewed.   Constitutional:       General: He is in acute  distress.      Appearance: He is obese. He is ill-appearing.   Cardiovascular:      Rate and Rhythm: Regular rhythm. Tachycardia present.   Pulmonary:      Effort: Respiratory distress present.      Breath sounds: Rhonchi present.   Abdominal:      General: Abdomen is flat.   Musculoskeletal:      Right lower leg: Edema present.      Left lower leg: Edema present.   Skin:     General: Skin is warm.   Neurological:      Mental Status: He is alert.            Significant Labs: All pertinent lab results from the last 24 hours have been reviewed.

## 2024-07-15 NOTE — CONSULTS
[facility] - Cardiac Intensive Care  Pulmonology  Consult Note    Patient Name: Yong Mcintyre  MRN: [id number]  Admission Date: 7/4/2024  Hospital Length of Stay: 11 days  Code Status: Full Code  Attending Physician: Rossy Leary MD  Primary Care Provider: Gianni Escalona MD   Principal Problem: Right ventricular dysfunction    Inpatient consult to Pulmonology  Consult performed by: Ibrahima Galvin MD  Consult ordered by: Sheeba Baum DO        Subjective:     HPI:  48 year old with history of severe PH (Group 2-3) MALLIKA, HFpEF, and A.fib who presented to [facility] on 7/5 with complaints of shortness of breath lower extremity edema. The patient is on 3L home O2 and takes 3x weekly metolazone at home in addition to torsemide 60mg twice a day. The patient was was admitted to the CCU for right heart failure and diuresed with a lasix drip. He was requiring 40L/70% FiO2 on admission. He has remained with a significant O2 requirement since admission. The patient had an episode of confusion and somnolence a few days ago and pCO2 was in the 80's at that time. Suspicion for hypercapnic respiratory failure. He has consistently had Co2's in the 80's this admission. Pulmonology was consulted for hypercapnic respiratory failure.    Past Medical History:   Diagnosis Date    Arthritis     CHF (congestive heart failure)     Diastolic dysfunction    Cor pulmonale 11/05/2012    Gallstones     GERD (gastroesophageal reflux disease)     Hypertension     Morbid obesity 11/05/2012    Obesity hypoventilation syndrome     On home oxygen therapy 03/07/2014    MALLIKA (obstructive sleep apnea)     BPAP 16/11 with 3 L at night (continuous O2).    Paroxysmal atrial fibrillation     PFO (patent foramen ovale) 11/05/2012    status post closure    Pickwickian syndrome 11/05/2012    Pulmonary hypertension        Past Surgical History:   Procedure Laterality Date    CHOLECYSTECTOMY      RIGHT HEART CATHETERIZATION Right 03/22/2022    Procedure:  "INSERTION, CATHETER, RIGHT HEART;  Surgeon: Tony Martínez MD;  Location: [facility] CATH LAB;  Service: Cardiology;  Laterality: Right;    RIGHT HEART CATHETERIZATION Right 01/31/2024    Procedure: INSERTION, CATHETER, RIGHT HEART;  Surgeon: Sheri Ritter MD;  Location: [facility] CATH LAB;  Service: Cardiology;  Laterality: Right;    UPPER GASTROINTESTINAL ENDOSCOPY      2-3 years ago       Review of patient's allergies indicates:   Allergen Reactions    Lipitor [atorvastatin] Other (See Comments)     "it makes my legs feel like jelly"  Other reaction(s): causes legs to hurt       Family History       Problem Relation (Age of Onset)    Arthritis Mother, Maternal Grandmother, Maternal Grandfather    Asthma Mother, Sister, Maternal Grandmother    Breast cancer Paternal Grandmother    Diabetes Maternal Grandmother    Down syndrome Brother    Gout Brother    Hypertension Father, Maternal Grandmother, Maternal Grandfather, Paternal Grandfather          Tobacco Use    Smoking status: Never    Smokeless tobacco: Never   Substance and Sexual Activity    Alcohol use: Yes     Comment: holidays  rare    Drug use: No    Sexual activity: Not Currently     Partners: Female         Review of Systems   All other systems reviewed and are negative.    Objective:     Vital Signs (Most Recent):  Temp: 97.9 °F (36.6 °C) (07/15/24 1200)  Pulse: (!) 128 (07/15/24 1400)  Resp: 17 (07/15/24 1400)  BP: 107/64 (07/15/24 1400)  SpO2: (!) 88 % (07/15/24 1518) Vital Signs (24h Range):  Temp:  [97.3 °F (36.3 °C)-97.9 °F (36.6 °C)] 97.9 °F (36.6 °C)  Pulse:  [126-133] 128  Resp:  [13-52] 17  SpO2:  [81 %-99 %] 88 %  BP: ()/(54-78) 107/64     Weight: 101.5 kg (223 lb 12.3 oz)  Body mass index is 37.24 kg/m².      Intake/Output Summary (Last 24 hours) at 7/15/2024 1544  Last data filed at 7/15/2024 1400  Gross per 24 hour   Intake 1296.16 ml   Output 4200 ml   Net -2903.84 ml        Physical Exam  Vitals and nursing note reviewed.   Constitutional:  "      General: He is not in acute distress.     Appearance: He is not toxic-appearing.      Interventions: Nasal cannula in place.      Comments: Comfort flow on at 35 L 45 %   HENT:      Head: Normocephalic and atraumatic.      Nose: Congestion present.      Mouth/Throat:      Mouth: Mucous membranes are moist.   Eyes:      General: No scleral icterus.     Pupils: Pupils are equal, round, and reactive to light.   Neck:      Comments: Central line in place, dressing clean and intact  Cardiovascular:      Rate and Rhythm: Tachycardia present. Rhythm irregular.      Heart sounds: Normal heart sounds.   Pulmonary:      Effort: No respiratory distress.      Breath sounds: Rales present. No wheezing.      Comments: Bibasilar crackles.  Abdominal:      General: There is distension.      Palpations: Abdomen is soft.      Tenderness: There is no abdominal tenderness. There is no guarding.   Musculoskeletal:      Right lower leg: Edema present.      Left lower leg: Edema present.   Skin:     General: Skin is warm and dry.   Neurological:      Mental Status: He is alert and oriented to person, place, and time.   Psychiatric:         Mood and Affect: Mood normal.         Behavior: Behavior normal. Behavior is cooperative.          Vents:  Oxygen Concentration (%): 60 (07/15/24 1518)    Lines/Drains/Airways       Central Venous Catheter Line  Duration             Percutaneous Central Line - Triple Lumen  07/08/24 1145 Internal Jugular Right 7 days              Drain  Duration             Male External Urinary Catheter 07/15/24 0400 Small <1 day              Peripheral Intravenous Line  Duration                  Peripheral IV - Single Lumen 07/12/24 1258 18 G 1 1/4 in No Distal;Left;Posterior Forearm 3 days                    Significant Labs:    CBC/Anemia Profile:  Recent Labs   Lab 07/14/24  0337 07/15/24  0352   WBC 5.94 6.16   HGB 12.2* 13.3*   HCT 46.9 49.0    166   MCV 91 89   RDW 21.3* 21.6*         Chemistries:  Recent Labs   Lab 07/14/24  0337 07/14/24  1046 07/14/24  1800 07/15/24  0352 07/15/24  1218    139  --  141 140   K 2.9* 4.0 3.3* 4.0 3.6   CL 93* 93*  --  94* 93*   CO2 38* 34*  --  37* 38*   BUN 67* 63*  --  59* 57*   CREATININE 2.0* 1.9*  --  2.0* 1.9*   CALCIUM 9.7 9.8  --  9.9 9.8   ALBUMIN 2.6*  --   --  2.8*  --    PROT 7.4  --   --  8.1  --    BILITOT 0.9  --   --  0.8  --    ALKPHOS 132  --   --  145*  --    ALT 11  --   --  11  --    AST 18  --   --  19  --    MG 1.8  --  2.0 2.0  --    PHOS 4.2  --   --  4.3  --        All pertinent labs within the past 24 hours have been reviewed.    Significant Imaging:   I have reviewed all pertinent imaging results/findings within the past 24 hours.    X-Ray Chest AP Portable  Narrative: EXAMINATION:  XR CHEST AP PORTABLE    CLINICAL HISTORY:  Heart failure;    TECHNIQUE:  Single frontal view of the chest was performed.    COMPARISON:  Prior dated 07/12/2024    FINDINGS:  There is a right-sided IJ catheter with tip projecting over the SVC.  The mediastinal structures are midline.  The cardiac silhouette is enlarged and stable.  There is pulmonary vascular congestion and bilateral extensive ground-glass opacity favored to reflect edema but nonspecific.  Findings are unchanged.  Impression: Stable enlarged cardiac silhouette and bilateral ground-glass opacities favored to reflect edema.    Electronically signed by: Soniya Singer MD  Date:    07/13/2024  Time:    09:43      Assessment/Plan:     Pulmonary  Acute on chronic respiratory failure with hypoxia  Patient with a history of chronic CO2 retention, has labs with elevated bicarb for years (baseline appears upper 30's) on home NIV. Patient is adherent with home O2 and BiPAP. Per notes from [facility], there was some concern for inadequate ventilatory support at home due to worsening bicarb and Hgb, however patient has had multiple hospitalizations in the past calendar year alone for right heart  failure. Most recent Echo confirming very high pulmonary artery systolic pressure, and some RV dysfunction but not severely dilating to the point of impeding left heart function.    Patient's hypercapnia us multifactorial. Baseline ventilatory defect is present (severe obstruction on prior PFTs in the [facility] system) as well as sheer ventilatory restriction from significantly enlarged heart. Patient's baseline CO2 is hard to determine, but has been in the 70's with normal PH's on arterial blood gases. Patient has been appropriately aggressively diuresed during this hospitalization and may have developed a contraction alkalosis. It's likely some aspect of this increase in CO2 over the past few days is a compensatory response to his alkalosis. Has started diamox for assisted diuresis. Overall, patient has multiple severely morbid and high mortality conditions.     - Would continue diuresis and agree with diamox  - Patient will require BiPAP at night / with naps. He is mentating well, and well compensated at this time so can come off during the day as long as he is mentating well and pH is not decreasing.  - On bronchodilators already, would schedule duo nebs (or levalbuterol if concern for tachyarrthymia)  - Goal O2 sat is 88-92%. Patient has significant hypoxemia and requires significant support at this time. Could consider Echo with bubble study  - Prior CT with mosiacism and what could be cystic lung changes. Will send off autoimmune panel. Would like repeat CT however on too much support at this time.    Cardiac/Vascular  Pulmonary hypertension  Patient with severe PH, last RHC in 1/2024 with Wedge of 15 (24 with exercise) and PVR of 10. Was previously on Bosentan in the past but has been off for some time due to lack of indication. Seems WHO group 2-3 moreso per Dr. Child's last note.    Regardless, minimal data for efficacy on PH specific meds in group 2 and 3 disease. Could lead to worsening VQ mismatch /  left heart overload and worsen his symptoms.          Thank you for your consult. I will follow-up with patient. Please contact us if you have any additional questions.     Ibrahima Galvin MD  Pulmonology  [facility] - Cardiac Intensive [facility]

## 2024-07-16 ENCOUNTER — ANESTHESIA EVENT (OUTPATIENT)
Dept: MEDSURG UNIT | Facility: HOSPITAL | Age: 49
DRG: 291 | End: 2024-07-16
Payer: MEDICARE

## 2024-07-16 ENCOUNTER — ANESTHESIA (OUTPATIENT)
Dept: MEDSURG UNIT | Facility: HOSPITAL | Age: 49
DRG: 291 | End: 2024-07-16
Payer: MEDICARE

## 2024-07-16 LAB
ALBUMIN SERPL BCP-MCNC: 2.8 G/DL (ref 3.5–5.2)
ALLENS TEST: ABNORMAL
ALP SERPL-CCNC: 152 U/L (ref 55–135)
ALT SERPL W/O P-5'-P-CCNC: 10 U/L (ref 10–44)
ANA PATTERN 1: NORMAL
ANA SER QL IF: POSITIVE
ANA TITR SER IF: NORMAL {TITER}
ANION GAP SERPL CALC-SCNC: 11 MMOL/L (ref 8–16)
AST SERPL-CCNC: 19 U/L (ref 10–40)
BASOPHILS # BLD AUTO: 0.04 K/UL (ref 0–0.2)
BASOPHILS NFR BLD: 0.6 % (ref 0–1.9)
BILIRUB SERPL-MCNC: 1.1 MG/DL (ref 0.1–1)
BUN SERPL-MCNC: 56 MG/DL (ref 6–20)
C3 SERPL-MCNC: 101 MG/DL (ref 50–180)
C4 SERPL-MCNC: 31 MG/DL (ref 11–44)
CALCIUM SERPL-MCNC: 10 MG/DL (ref 8.7–10.5)
CCP AB SER IA-ACNC: 1.3 U/ML
CHLORIDE SERPL-SCNC: 91 MMOL/L (ref 95–110)
CO2 SERPL-SCNC: 38 MMOL/L (ref 23–29)
CREAT SERPL-MCNC: 1.9 MG/DL (ref 0.5–1.4)
CRP SERPL-MCNC: 21.9 MG/L (ref 0–8.2)
DELSYS: ABNORMAL
DIFFERENTIAL METHOD BLD: ABNORMAL
DSDNA AB SER-ACNC: NORMAL [IU]/ML
EOSINOPHIL # BLD AUTO: 0.2 K/UL (ref 0–0.5)
EOSINOPHIL NFR BLD: 3.1 % (ref 0–8)
EP: 8
ERYTHROCYTE [DISTWIDTH] IN BLOOD BY AUTOMATED COUNT: 22.2 % (ref 11.5–14.5)
ERYTHROCYTE [SEDIMENTATION RATE] IN BLOOD BY PHOTOMETRIC METHOD: >120 MM/HR (ref 0–23)
ERYTHROCYTE [SEDIMENTATION RATE] IN BLOOD BY WESTERGREN METHOD: 26 MM/H
EST. GFR  (NO RACE VARIABLE): 43 ML/MIN/1.73 M^2
FIO2: 60
GLUCOSE SERPL-MCNC: 102 MG/DL (ref 70–110)
HCO3 UR-SCNC: 41.4 MMOL/L (ref 24–28)
HCT VFR BLD AUTO: 49.2 % (ref 40–54)
HGB BLD-MCNC: 13.6 G/DL (ref 14–18)
HIV 1+2 AB+HIV1 P24 AG SERPL QL IA: NORMAL
IMM GRANULOCYTES # BLD AUTO: 0.02 K/UL (ref 0–0.04)
IMM GRANULOCYTES NFR BLD AUTO: 0.3 % (ref 0–0.5)
INR PPP: 2.5 (ref 0.8–1.2)
IP: 15
LYMPHOCYTES # BLD AUTO: 0.9 K/UL (ref 1–4.8)
LYMPHOCYTES NFR BLD: 13.8 % (ref 18–48)
MAGNESIUM SERPL-MCNC: 1.8 MG/DL (ref 1.6–2.6)
MCH RBC QN AUTO: 24.2 PG (ref 27–31)
MCHC RBC AUTO-ENTMCNC: 27.6 G/DL (ref 32–36)
MCV RBC AUTO: 88 FL (ref 82–98)
MODE: ABNORMAL
MONOCYTES # BLD AUTO: 0.5 K/UL (ref 0.3–1)
MONOCYTES NFR BLD: 7.8 % (ref 4–15)
NEUTROPHILS # BLD AUTO: 4.8 K/UL (ref 1.8–7.7)
NEUTROPHILS NFR BLD: 74.4 % (ref 38–73)
NRBC BLD-RTO: 0 /100 WBC
OHS QRS DURATION: 96 MS
OHS QTC CALCULATION: 566 MS
PCO2 BLDA: 79.7 MMHG (ref 35–45)
PH SMN: 7.32 [PH] (ref 7.35–7.45)
PHOSPHATE SERPL-MCNC: 4.3 MG/DL (ref 2.7–4.5)
PLATELET # BLD AUTO: 162 K/UL (ref 150–450)
PMV BLD AUTO: 10.5 FL (ref 9.2–12.9)
PO2 BLDA: 37 MMHG (ref 40–60)
POC BE: 15 MMOL/L
POC SATURATED O2: 63 % (ref 95–100)
POC TCO2: 44 MMOL/L (ref 24–29)
POTASSIUM SERPL-SCNC: 3.5 MMOL/L (ref 3.5–5.1)
PROT SERPL-MCNC: 8.3 G/DL (ref 6–8.4)
PROTHROMBIN TIME: 25.6 SEC (ref 9–12.5)
RBC # BLD AUTO: 5.62 M/UL (ref 4.6–6.2)
RHEUMATOID FACT SERPL-ACNC: 375 IU/ML (ref 0–15)
SAMPLE: ABNORMAL
SITE: ABNORMAL
SODIUM SERPL-SCNC: 140 MMOL/L (ref 136–145)
SP02: 89
WBC # BLD AUTO: 6.4 K/UL (ref 3.9–12.7)

## 2024-07-16 PROCEDURE — D9220A PRA ANESTHESIA: Mod: ANES,,, | Performed by: ANESTHESIOLOGY

## 2024-07-16 PROCEDURE — 63600175 PHARM REV CODE 636 W HCPCS

## 2024-07-16 PROCEDURE — 94660 CPAP INITIATION&MGMT: CPT

## 2024-07-16 PROCEDURE — 86235 NUCLEAR ANTIGEN ANTIBODY: CPT | Performed by: INTERNAL MEDICINE

## 2024-07-16 PROCEDURE — 94640 AIRWAY INHALATION TREATMENT: CPT

## 2024-07-16 PROCEDURE — 99900035 HC TECH TIME PER 15 MIN (STAT)

## 2024-07-16 PROCEDURE — 92960 CARDIOVERSION ELECTRIC EXT: CPT | Performed by: INTERNAL MEDICINE

## 2024-07-16 PROCEDURE — 86160 COMPLEMENT ANTIGEN: CPT | Performed by: INTERNAL MEDICINE

## 2024-07-16 PROCEDURE — 86160 COMPLEMENT ANTIGEN: CPT | Mod: 59 | Performed by: INTERNAL MEDICINE

## 2024-07-16 PROCEDURE — 82803 BLOOD GASES ANY COMBINATION: CPT

## 2024-07-16 PROCEDURE — 63600175 PHARM REV CODE 636 W HCPCS: Performed by: NURSE ANESTHETIST, CERTIFIED REGISTERED

## 2024-07-16 PROCEDURE — 99900031 HC PATIENT EDUCATION (STAT)

## 2024-07-16 PROCEDURE — 36415 COLL VENOUS BLD VENIPUNCTURE: CPT

## 2024-07-16 PROCEDURE — 85652 RBC SED RATE AUTOMATED: CPT

## 2024-07-16 PROCEDURE — 63600175 PHARM REV CODE 636 W HCPCS: Performed by: STUDENT IN AN ORGANIZED HEALTH CARE EDUCATION/TRAINING PROGRAM

## 2024-07-16 PROCEDURE — 25000003 PHARM REV CODE 250

## 2024-07-16 PROCEDURE — 85025 COMPLETE CBC W/AUTO DIFF WBC: CPT

## 2024-07-16 PROCEDURE — 27100171 HC OXYGEN HIGH FLOW UP TO 24 HOURS

## 2024-07-16 PROCEDURE — 86225 DNA ANTIBODY NATIVE: CPT | Performed by: INTERNAL MEDICINE

## 2024-07-16 PROCEDURE — 25000003 PHARM REV CODE 250: Performed by: STUDENT IN AN ORGANIZED HEALTH CARE EDUCATION/TRAINING PROGRAM

## 2024-07-16 PROCEDURE — 20000000 HC ICU ROOM

## 2024-07-16 PROCEDURE — 84100 ASSAY OF PHOSPHORUS: CPT

## 2024-07-16 PROCEDURE — 94799 UNLISTED PULMONARY SVC/PX: CPT

## 2024-07-16 PROCEDURE — 83516 IMMUNOASSAY NONANTIBODY: CPT | Performed by: INTERNAL MEDICINE

## 2024-07-16 PROCEDURE — 83735 ASSAY OF MAGNESIUM: CPT

## 2024-07-16 PROCEDURE — 99233 SBSQ HOSP IP/OBS HIGH 50: CPT | Mod: GC,,, | Performed by: INTERNAL MEDICINE

## 2024-07-16 PROCEDURE — 86200 CCP ANTIBODY: CPT | Performed by: INTERNAL MEDICINE

## 2024-07-16 PROCEDURE — 99231 SBSQ HOSP IP/OBS SF/LOW 25: CPT | Mod: GC,,, | Performed by: INTERNAL MEDICINE

## 2024-07-16 PROCEDURE — D9220A PRA ANESTHESIA: Mod: CRNA,,, | Performed by: NURSE ANESTHETIST, CERTIFIED REGISTERED

## 2024-07-16 PROCEDURE — 25000003 PHARM REV CODE 250: Performed by: NURSE ANESTHETIST, CERTIFIED REGISTERED

## 2024-07-16 PROCEDURE — 5A2204Z RESTORATION OF CARDIAC RHYTHM, SINGLE: ICD-10-PCS | Performed by: INTERNAL MEDICINE

## 2024-07-16 PROCEDURE — 25000003 PHARM REV CODE 250: Performed by: INTERNAL MEDICINE

## 2024-07-16 PROCEDURE — 87389 HIV-1 AG W/HIV-1&-2 AB AG IA: CPT | Performed by: INTERNAL MEDICINE

## 2024-07-16 PROCEDURE — 86140 C-REACTIVE PROTEIN: CPT

## 2024-07-16 PROCEDURE — 86431 RHEUMATOID FACTOR QUANT: CPT | Performed by: INTERNAL MEDICINE

## 2024-07-16 PROCEDURE — 94761 N-INVAS EAR/PLS OXIMETRY MLT: CPT | Mod: XB

## 2024-07-16 PROCEDURE — 85610 PROTHROMBIN TIME: CPT

## 2024-07-16 PROCEDURE — 80053 COMPREHEN METABOLIC PANEL: CPT

## 2024-07-16 PROCEDURE — 92960 CARDIOVERSION ELECTRIC EXT: CPT | Mod: ,,, | Performed by: INTERNAL MEDICINE

## 2024-07-16 RX ORDER — PROPOFOL 10 MG/ML
VIAL (ML) INTRAVENOUS
Status: DISCONTINUED | OUTPATIENT
Start: 2024-07-16 | End: 2024-07-16

## 2024-07-16 RX ORDER — QUETIAPINE FUMARATE 25 MG/1
50 TABLET, FILM COATED ORAL NIGHTLY
Status: DISCONTINUED | OUTPATIENT
Start: 2024-07-16 | End: 2024-07-29 | Stop reason: HOSPADM

## 2024-07-16 RX ORDER — POTASSIUM CHLORIDE 20 MEQ/1
40 TABLET, EXTENDED RELEASE ORAL ONCE
Status: COMPLETED | OUTPATIENT
Start: 2024-07-16 | End: 2024-07-16

## 2024-07-16 RX ORDER — SIMETHICONE 80 MG
2 TABLET,CHEWABLE ORAL ONCE
Status: DISCONTINUED | OUTPATIENT
Start: 2024-07-16 | End: 2024-07-16

## 2024-07-16 RX ORDER — AMOXICILLIN 250 MG
1 CAPSULE ORAL DAILY
Status: DISCONTINUED | OUTPATIENT
Start: 2024-07-16 | End: 2024-07-29 | Stop reason: HOSPADM

## 2024-07-16 RX ORDER — MAGNESIUM SULFATE HEPTAHYDRATE 40 MG/ML
2 INJECTION, SOLUTION INTRAVENOUS ONCE
Status: COMPLETED | OUTPATIENT
Start: 2024-07-16 | End: 2024-07-16

## 2024-07-16 RX ORDER — MIDAZOLAM HYDROCHLORIDE 1 MG/ML
INJECTION INTRAMUSCULAR; INTRAVENOUS
Status: DISCONTINUED | OUTPATIENT
Start: 2024-07-16 | End: 2024-07-16

## 2024-07-16 RX ORDER — ALUMINUM HYDROXIDE, MAGNESIUM HYDROXIDE, AND SIMETHICONE 2400; 240; 2400 MG/30ML; MG/30ML; MG/30ML
30 SUSPENSION ORAL EVERY 6 HOURS PRN
Status: DISCONTINUED | OUTPATIENT
Start: 2024-07-16 | End: 2024-07-29 | Stop reason: HOSPADM

## 2024-07-16 RX ORDER — GLYCERIN 1 G/1
1 SUPPOSITORY RECTAL DAILY PRN
Status: DISCONTINUED | OUTPATIENT
Start: 2024-07-16 | End: 2024-07-29 | Stop reason: HOSPADM

## 2024-07-16 RX ADMIN — SODIUM CHLORIDE: 0.9 INJECTION, SOLUTION INTRAVENOUS at 10:07

## 2024-07-16 RX ADMIN — MUPIROCIN: 20 OINTMENT TOPICAL at 08:07

## 2024-07-16 RX ADMIN — AMIODARONE HYDROCHLORIDE 0.5 MG/MIN: 1.8 INJECTION, SOLUTION INTRAVENOUS at 08:07

## 2024-07-16 RX ADMIN — FLUTICASONE PROPIONATE AND SALMETEROL 1 PUFF: 50; 250 POWDER RESPIRATORY (INHALATION) at 07:07

## 2024-07-16 RX ADMIN — PROPOFOL 20 MG: 10 INJECTION, EMULSION INTRAVENOUS at 10:07

## 2024-07-16 RX ADMIN — MAGNESIUM SULFATE HEPTAHYDRATE 2 G: 40 INJECTION, SOLUTION INTRAVENOUS at 07:07

## 2024-07-16 RX ADMIN — AMIODARONE HYDROCHLORIDE 0.5 MG/MIN: 1.8 INJECTION, SOLUTION INTRAVENOUS at 07:07

## 2024-07-16 RX ADMIN — ALBUTEROL SULFATE 2 PUFF: 108 INHALANT RESPIRATORY (INHALATION) at 07:07

## 2024-07-16 RX ADMIN — FUROSEMIDE 40 MG/HR: 10 INJECTION, SOLUTION INTRAMUSCULAR; INTRAVENOUS at 08:07

## 2024-07-16 RX ADMIN — PANTOPRAZOLE SODIUM 40 MG: 40 TABLET, DELAYED RELEASE ORAL at 08:07

## 2024-07-16 RX ADMIN — QUETIAPINE FUMARATE 50 MG: 25 TABLET ORAL at 08:07

## 2024-07-16 RX ADMIN — ACETAMINOPHEN 650 MG: 325 TABLET ORAL at 04:07

## 2024-07-16 RX ADMIN — ALUMINUM HYDROXIDE, MAGNESIUM HYDROXIDE, AND DIMETHICONE 30 ML: 400; 400; 40 SUSPENSION ORAL at 05:07

## 2024-07-16 RX ADMIN — FUROSEMIDE 40 MG/HR: 10 INJECTION, SOLUTION INTRAMUSCULAR; INTRAVENOUS at 04:07

## 2024-07-16 RX ADMIN — MIDAZOLAM HYDROCHLORIDE 2 MG: 2 INJECTION, SOLUTION INTRAMUSCULAR; INTRAVENOUS at 10:07

## 2024-07-16 RX ADMIN — WARFARIN SODIUM 5 MG: 5 TABLET ORAL at 05:07

## 2024-07-16 RX ADMIN — ALBUTEROL SULFATE 2 PUFF: 108 INHALANT RESPIRATORY (INHALATION) at 12:07

## 2024-07-16 RX ADMIN — POTASSIUM CHLORIDE 40 MEQ: 1500 TABLET, EXTENDED RELEASE ORAL at 08:07

## 2024-07-16 RX ADMIN — DOBUTAMINE HYDROCHLORIDE 5 MCG/KG/MIN: 400 INJECTION INTRAVENOUS at 04:07

## 2024-07-16 RX ADMIN — SODIUM CHLORIDE: 9 INJECTION, SOLUTION INTRAVENOUS at 07:07

## 2024-07-16 RX ADMIN — ALLOPURINOL 300 MG: 100 TABLET ORAL at 08:07

## 2024-07-16 NOTE — ASSESSMENT & PLAN NOTE
Patient with Hypercapnic and Hypoxic Respiratory failure which is Acute on chronic.  he is on home oxygen at 3 LPM. Supplemental oxygen was provided and noted- Oxygen Concentration (%):  [50-70] 70    Signs/symptoms of respiratory failure include- tachypnea and increased work of breathing. Contributing diagnoses includes - CHF Labs and images were reviewed. Patient Has recent ABG, which has been reviewed.    -- Trend VBG BID  -- BiPap during day as tolerated; okay for HFNC w/ meals  -- Goal SpO2 > 88%

## 2024-07-16 NOTE — ANESTHESIA PREPROCEDURE EVALUATION
07/16/2024  Yong Mcintyre is a 48 y.o., male.  Patient Active Problem List   Diagnosis    Chronic pulmonary heart disease, unspecified    Hypoventilation syndrome    Pulmonary hypertension    PFO (patent foramen ovale)    MALLIKA (obstructive sleep apnea)    Gallstones    Hypertension    Biliary colic    Palliative care encounter    Chest pain, rule out acute myocardial infarction    Hypercoagulability due to atrial fibrillation    Orthostatic hypotension    Oxygen dependent    Acute on chronic heart failure with preserved ejection fraction (HFpEF)    Chest pain, pleuritic    Hypokalemia    Class 2 obesity due to excess calories in adult    Hypomagnesemia    Insomnia    Stage 3b chronic kidney disease    Chronic asthmatic bronchitis    Chronic right-sided heart failure    Chronic respiratory failure with hypoxia and hypercapnia    Right ventricular dysfunction    GERD (gastroesophageal reflux disease)    Paroxysmal A-fib    Gout    Obesity (BMI 30-39.9)    On Coumadin for atrial fibrillation    Chronic kidney disease (CKD), stage IV (severe)    Proteinuria    Hyperparathyroidism, secondary    Advance care planning    Acute on chronic right-sided congestive heart failure    Acute on chronic respiratory failure with hypoxia    Metabolic alkalosis    Atypical atrial flutter           Pre-op Assessment    I have reviewed the Patient Summary Reports.       I have reviewed the Medications.     Review of Systems  Anesthesia Hx:  No problems with previous Anesthesia               Denies Personal Hx of Anesthesia complications.                    Cardiovascular:     Hypertension       CHF              Congestive Heart Failure (CHF)                Hypertension     Atrial Fibrillation     Pulmonary:    Asthma    Sleep Apnea   Asthma:    Obstructive Sleep Apnea (MALLIKA).           Renal/:  Chronic Renal Disease         Kidney Function/Disease             Hepatic/GI:     GERD      Gerd              Physical Exam    Airway:  No airway management difficulties anticipated  Dental:  No active dental issues noted  Chest/Lungs:  Respiratory Distress, Tachypnea    Heart:  Rate: Tachycardia  Rhythm: Regular Rhythm  Sounds: Normal        Anesthesia Plan  Type of Anesthesia, risks & benefits discussed:    Anesthesia Type: Gen Natural Airway  Informed Consent: Informed consent signed with the Patient and all parties understand the risks and agree with anesthesia plan.  All questions answered.   ASA Score: 4  Anesthesia Plan Notes: Chart reviewed. Patient seen and examined. Anesthesia plan discussed and questions answered. E-consent signed. Yusuf Lenz MD    [facility] From Anesthesia Perspective.     .

## 2024-07-16 NOTE — ANESTHESIA POSTPROCEDURE EVALUATION
Anesthesia Post Evaluation    Patient: Yong Mcintyre    Procedure(s) Performed: Procedure(s) (LRB):  Cardioversion or Defibrillation (N/A)    Final Anesthesia Type: general      Level of consciousness: awake and alert  Post-procedure vital signs: reviewed and stable  Pain control: Pain has been treated.  Airway patency: patent    PONV status: Absent or treated.  Anesthetic complications: no      Cardiovascular status: hemodynamically stable  Respiratory status: unassisted  Hydration status: euvolemic                Vitals Value Taken Time   BP 96/51 07/16/24 1010   Temp 36.8 °C (98.2 °F) 07/16/24 0800   Pulse 134 07/16/24 1050   Resp 23 07/16/24 1050   SpO2 91 % 07/16/24 1050   Vitals shown include unfiled device data.      No case tracking events are documented in the log.      Pain/Mignon Score: Pain Rating Prior to Med Admin: 8 (7/16/2024  4:41 AM)  Pain Rating Post Med Admin: 3 (7/16/2024  5:41 AM)

## 2024-07-16 NOTE — PROGRESS NOTES
[facility] - Cardiac Intensive Care  Pulmonology  Progress Note    Patient Name: Yong Mcintyre  MRN: [id number]  Admission Date: 7/4/2024  Hospital Length of Stay: 12 days  Code Status: Full Code  Attending Provider: Rossy Leary MD  Primary Care Provider: Gianni Escalona MD   Principal Problem: Atypical atrial flutter    Subjective:     Interval History: Events from overnight reviewed. Patient able to wean FiO2 overnight. 1.3L negative for past 24 hours. Planned for cardioversion today. Patient reports feeling well, was up to chair yesterday and oxygenation improved significantly while ambulating.      Objective:     Vital Signs (Most Recent):  Temp: 97.5 °F (36.4 °C) (07/16/24 0301)  Pulse: (!) 135 (07/16/24 0717)  Resp: (!) 29 (07/16/24 0717)  BP: 104/65 (07/16/24 0601)  SpO2: 95 % (07/16/24 0717) Vital Signs (24h Range):  Temp:  [97.3 °F (36.3 °C)-99.1 °F (37.3 °C)] 97.5 °F (36.4 °C)  Pulse:  [107-138] 135  Resp:  [13-52] 29  SpO2:  [84 %-97 %] 95 %  BP: ()/(55-69) 104/65     Weight: 99.5 kg (219 lb 5.7 oz)  Body mass index is 36.5 kg/m².      Intake/Output Summary (Last 24 hours) at 7/16/2024 0922  Last data filed at 7/16/2024 0601  Gross per 24 hour   Intake 1590.92 ml   Output 2155 ml   Net -564.08 ml        Physical Exam  Vitals and nursing note reviewed.   Constitutional:       General: He is not in acute distress.     Appearance: He is not toxic-appearing.      Interventions: Nasal cannula in place.   HENT:      Head: Normocephalic and atraumatic.      Nose: Congestion present.      Mouth/Throat:      Mouth: Mucous membranes are moist.   Eyes:      General: No scleral icterus.     Pupils: Pupils are equal, round, and reactive to light.   Neck:      Comments: Central line in place, dressing clean and intact  Cardiovascular:      Rate and Rhythm: Tachycardia present. Rhythm irregular.      Heart sounds: Normal heart sounds.   Pulmonary:      Effort: No respiratory distress.      Breath  sounds: Rales present. No wheezing.      Comments: Bibasilar crackles.  Abdominal:      General: There is distension.      Palpations: Abdomen is soft.      Tenderness: There is no abdominal tenderness. There is no guarding.   Musculoskeletal:      Right lower leg: Edema present.      Left lower leg: Edema present.   Skin:     General: Skin is warm and dry.   Neurological:      Mental Status: He is alert and oriented to person, place, and time.   Psychiatric:         Mood and Affect: Mood normal.         Behavior: Behavior normal. Behavior is cooperative.           Review of Systems    Vents:  Oxygen Concentration (%): 60 (07/16/24 0715)    Lines/Drains/Airways       Central Venous Catheter Line  Duration             Percutaneous Central Line - Triple Lumen  07/08/24 1145 Internal Jugular Right 7 days              Peripheral Intravenous Line  Duration                  Peripheral IV - Single Lumen 07/12/24 1258 18 G 1 1/4 in No Distal;Left;Posterior Forearm 3 days                    Significant Labs:    CBC/Anemia Profile:  Recent Labs   Lab 07/15/24  0352 07/16/24  0324   WBC 6.16 6.40   HGB 13.3* 13.6*   HCT 49.0 49.2    162   MCV 89 88   RDW 21.6* 22.2*        Chemistries:  Recent Labs   Lab 07/14/24  1800 07/15/24  0352 07/15/24  1218 07/16/24  0324   NA  --  141 140 140   K 3.3* 4.0 3.6 3.5   CL  --  94* 93* 91*   CO2  --  37* 38* 38*   BUN  --  59* 57* 56*   CREATININE  --  2.0* 1.9* 1.9*   CALCIUM  --  9.9 9.8 10.0   ALBUMIN  --  2.8*  --  2.8*   PROT  --  8.1  --  8.3   BILITOT  --  0.8  --  1.1*   ALKPHOS  --  145*  --  152*   ALT  --  11  --  10   AST  --  19  --  19   MG 2.0 2.0  --  1.8   PHOS  --  4.3  --  4.3       All pertinent labs within the past 24 hours have been reviewed.    Significant Imaging:  I have reviewed all pertinent imaging results/findings within the past 24 hours.  Assessment/Plan:     Pulmonary  Acute on chronic respiratory failure with hypoxia  Patient with a history of  chronic CO2 retention, has labs with elevated bicarb for years (baseline appears upper 30's) on home NIV. Patient is adherent with home O2 and BiPAP. Per notes from [facility], there was some concern for inadequate ventilatory support at home due to worsening bicarb and Hgb, however patient has had multiple hospitalizations in the past calendar year alone for right heart failure. Most recent Echo confirming very high pulmonary artery systolic pressure, and some RV dysfunction but not severely dilating to the point of impeding left heart function.    Patient's hypercapnia us multifactorial. Baseline ventilatory defect is present (severe obstruction on prior PFTs in the [facility] system) as well as sheer ventilatory restriction from significantly enlarged heart. Patient's baseline CO2 is hard to determine, but has been in the 70's with normal PH's on arterial blood gases. Patient has been appropriately aggressively diuresed during this hospitalization and may have developed a contraction alkalosis. It's likely some aspect of this increase in CO2 over the past few days is a compensatory response to his alkalosis. Has started diamox for assisted diuresis. Overall, patient has multiple severely morbid and high mortality conditions.     Patient's pCO2 improved with improvement in contraction alkalosis. CTD workup ordered. RF has returned very high. He does appear to have parenchymal abnormalities on CT persistently with what appear to be cysts. 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 There is significant mosaicism on his CT previously which could be consistent with air trapping (prior PFTs with significantly elevated RV-TLC ratio consistent with air trapping), however PVOD / PH can have similar changes.    - Would continue diuresis and agree with diamox  - Patient will require BiPAP at night / with naps. He is mentating well, and well compensated at this time so can come off during the day as  long as he is mentating well and pH is not decreasing.  - Continue bronchodilators   - Goal O2 sat is 88-92%. Patient has significant hypoxemia and requires significant support at this time.  - Will discuss CTD history with patient given elevated RF. Critical illness can cause patient's to have antibody positivity that is not consistent with CTD, however RF level is extremely high      Cardiac/Vascular  Pulmonary hypertension  Patient with severe PH, last RHC in 1/2024 with Wedge of 15 (24 with exercise) and PVR of 10. Was previously on Bosentan in the past but has been off for some time due to lack of indication. Seems WHO group 2-3 moreso per Dr. Child's last note.    Regardless, minimal data for efficacy on PH specific meds in group 2 and 3 disease. Could lead to worsening VQ mismatch / left heart overload and worsen his symptoms.  - Evaluating for CTD-ILD which may be contributing to patient's PH             Pulmonology will continue to follow, thank you for allowing us to participate in the care of this patient.    Ibrahima Galvin MD  Pulmonology  [facility] - Cardiac Intensive [facility]

## 2024-07-16 NOTE — ASSESSMENT & PLAN NOTE
- New onset  - On warfarin for Afib  - Patient on warfarin but has been subtherapeutic, will need to assess risk of cardioversion vs benefit to avoid HF in a patient with HF and tachycardia    - Continue AC as per Afib  - Started Amiodarone for rhythm control, and will add digoxin for rate control  - Monitor electrolytes  -continued in Aflutter consulted EPx

## 2024-07-16 NOTE — PLAN OF CARE
Interval History:   - patient on continuous BiPAP 18/11, 55%  - on Amio 0.5mg.min,  5mck/kg/min, and Lasix gtt  - on telemetry remains in aflutter with RVR to 130's      Temp:  [97.3 °F (36.3 °C)-99.1 °F (37.3 °C)] 98.2 °F (36.8 °C)  Pulse:  [107-138] 138  Resp:  [13-35] 24  SpO2:  [84 %-96 %] 90 %  BP: ()/(51-69) 99/60       Recommendations:  - continue warfarin for cardioembolic protection.   - NPO   - plan for straight DCCV today. High risk for DAWIT + has had therapeutic INRs since converting to atypical AFL on 7/14/24  - ok to continue Amio gtt for rate control for now acknowledging risk of further lung injury given his underlying pulmonary disease (asthma/COPD/severe PH)    Nicholas Correia

## 2024-07-16 NOTE — PROGRESS NOTES
[facility] - Cardiac Intensive Care  Cardiology  Progress Note    Patient Name: Yong Mcintyre  MRN: [id number]  Admission Date: 7/4/2024  Hospital Length of Stay: 12 days  Code Status: Full Code   Attending Physician: Rossy Leary MD   Primary Care Physician: Gianni Escalona MD  Expected Discharge Date: 7/22/2024  Principal Problem:Atypical atrial flutter    Subjective:     Hospital Course:   The patient was admitted to the CCU for further management of right-sided heart failure. He was diuresed with a lasix gtt. Electrolytes were monitored and repleted as indicated. Palliative care was consulted given his poor prognosis. The patient had significant O2 requirements on admission which were slow to wean. A central line was placed for close CVP monitoring in the setting of aggressive diuresis. Nutrition consult placed for low salt diet education. Acetazolamide was added to his diuretic regimen due to persistent metabolic alkalosis. The patient required intermittent Bipap due to hypercapnic respiratory failure. Dobutamine was added to augment cardiac output to further diurese patient. The patient was in aflutter, EP was consulted. Plan for cardioversion 7/16/24. Pulm consulted for hypercapnia. Removed HFNC and high O2 per NC, ok for permissive hypoxia to address hypercapnia.     Interval History: NAEON    ROS  Objective:     Vital Signs (Most Recent):  Temp: 98.2 °F (36.8 °C) (07/16/24 0800)  Pulse: 99 (07/16/24 1528)  Resp: (!) 23 (07/16/24 1528)  BP: 99/60 (07/16/24 0900)  SpO2: (!) 89 % (07/16/24 1528) Vital Signs (24h Range):  Temp:  [97.3 °F (36.3 °C)-98.3 °F (36.8 °C)] 98.2 °F (36.8 °C)  Pulse:  [] 99  Resp:  [13-35] 23  SpO2:  [84 %-95 %] 89 %  BP: ()/(51-69) 99/60     Weight: 99.5 kg (219 lb 5.7 oz)  Body mass index is 36.5 kg/m².     SpO2: (!) 89 %         Intake/Output Summary (Last 24 hours) at 7/16/2024 1804  Last data filed at 7/16/2024 1102  Gross per 24 hour   Intake 926.7 ml    Output 880 ml   Net 46.7 ml       Lines/Drains/Airways       Central Venous Catheter Line  Duration             Percutaneous Central Line - Triple Lumen  07/08/24 1145 Internal Jugular Right 8 days              Peripheral Intravenous Line  Duration                  Peripheral IV - Single Lumen 07/12/24 1258 18 G 1 1/4 in No Distal;Left;Posterior Forearm 4 days                       Physical Exam  Vitals reviewed.   Constitutional:       General: He is in acute distress.      Appearance: He is obese. He is ill-appearing.   Cardiovascular:      Rate and Rhythm: Regular rhythm. Tachycardia present.   Pulmonary:      Effort: Respiratory distress present.      Breath sounds: Rhonchi present.   Abdominal:      General: Abdomen is flat.   Musculoskeletal:      Right lower leg: Edema present.      Left lower leg: Edema present.   Skin:     General: Skin is warm.   Neurological:      Mental Status: He is alert.            Significant Labs: All pertinent lab results from the last 24 hours have been reviewed.    Significant Imaging: Echocardiogram: 2D echo with color flow doppler: No results found. However, due to the size of the patient record, not all encounters were searched. Please check Results Review for a complete set of results.    Assessment and Plan:       * Atypical atrial flutter  - New onset  - On warfarin for Afib  - Patient on warfarin but has been subtherapeutic, will need to assess risk of cardioversion vs benefit to avoid HF in a patient with HF and tachycardia    - Continue AC as per Afib  - Started Amiodarone for rhythm control, and will add digoxin for rate control  - Monitor electrolytes  -continued in Aflutter consulted EPx      Metabolic alkalosis  Bicarb 40's in setting of aggressive diuresis. Suspect contraction alkalosis.    - Continue diamox  - Trend Bicarb    Acute on chronic respiratory failure with hypoxia  Patient with Hypercapnic and Hypoxic Respiratory failure which is Acute on chronic.  he  is on home oxygen at 3 LPM. Supplemental oxygen was provided and noted- Oxygen Concentration (%):  [50-70] 70    Signs/symptoms of respiratory failure include- tachypnea and increased work of breathing. Contributing diagnoses includes - CHF Labs and images were reviewed. Patient Has recent ABG, which has been reviewed.    -- Trend VBG BID  -- BiPap during day as tolerated; okay for HFNC w/ meals  -- Goal SpO2 > 88%    Acute on chronic right-sided congestive heart failure  - See 'RV dysfunction'    Paroxysmal A-fib  On coumadin.    Lab Results   Component Value Date    INR 2.5 (H) 07/16/2024    INR 2.3 (H) 07/15/2024    INR 2.2 (H) 07/14/2024     -- Goal INR 2-3  -- Titrate warfarin daily    -- EP consulted for Aflutter, cardioverted 7/16    Right ventricular dysfunction  Acute on chronic. - NYHA class III symptoms with recurrent admissions.  - He was seen by [facility] outpatient 6/2024 who state patient was feeling better on new diuretic regimen however worried about the frequency of use of metolazone and his worsening kidney function.    - Admitted with CXR with cardiomegaly and pulmonary vascular congestion, suggestive of pulmonary edema secondary to CHF. BNP elevated at 800. Creatinine worse at 3.1 on admission.  - Given his recurrent HF admissions and most recent hemodynamics, [facility] believes his overall prognosis is poor and recommended palliative care consult. Palliative care evaluated patient. Patient wishes to continue full measures at this time.     - 2 gram sodium restriction and 1500cc fluid restriction.  - Encourage physical activity with graded exercise program.  - Continue central line for CVP monitoring  - Continue Acetazolamide PO  - lasix gtt   - Continue dobutamine gtt\  - amio gtt   - Diuril as needed  - Monitor lytes BID; replete as indicated    Palliative care encounter  - patient not amenable to palliative options     MALLIKA (obstructive sleep apnea)  See 'hypoventilation syndrome'    Pulmonary  hypertension  WHO group 2-3 per his managing pulmonologist (Danyell at [facility])  Adherent with diet, CPAP, and on continuous O2 3L at home.     -- RV dysfunction treatment as above    Hypoventilation syndrome  BIPAP QHS; NC during meals. Avoid HFNC.  Significant hypercapnia w/ pCO2 80-90; will start daytime BiPap    - BIPAP for ventilation preferred over high O2          VTE Risk Mitigation (From admission, onward)           Ordered     warfarin (COUMADIN) tablet 5 mg  Daily         07/12/24 0751     IP VTE HIGH RISK PATIENT  Once         07/04/24 2302     Place sequential compression device  Until discontinued         07/04/24 2302                    Sheeba Baum DO  Cardiology  [facility] - Cardiac Int[facility]

## 2024-07-16 NOTE — ASSESSMENT & PLAN NOTE
WHO group 2-3 per his managing pulmonologist (Danyell at [facility])  Adherent with diet, CPAP, and on continuous O2 3L at home.     -- RV dysfunction treatment as above

## 2024-07-16 NOTE — SUBJECTIVE & OBJECTIVE
Interval History: Events from overnight reviewed. Patient able to wean FiO2 overnight. 1.3L negative for past 24 hours. Planned for cardioversion today. Patient reports feeling well, was up to chair yesterday and oxygenation improved significantly while ambulating.      Objective:     Vital Signs (Most Recent):  Temp: 97.5 °F (36.4 °C) (07/16/24 0301)  Pulse: (!) 135 (07/16/24 0717)  Resp: (!) 29 (07/16/24 0717)  BP: 104/65 (07/16/24 0601)  SpO2: 95 % (07/16/24 0717) Vital Signs (24h Range):  Temp:  [97.3 °F (36.3 °C)-99.1 °F (37.3 °C)] 97.5 °F (36.4 °C)  Pulse:  [107-138] 135  Resp:  [13-52] 29  SpO2:  [84 %-97 %] 95 %  BP: ()/(55-69) 104/65     Weight: 99.5 kg (219 lb 5.7 oz)  Body mass index is 36.5 kg/m².      Intake/Output Summary (Last 24 hours) at 7/16/2024 0922  Last data filed at 7/16/2024 0601  Gross per 24 hour   Intake 1590.92 ml   Output 2155 ml   Net -564.08 ml        Physical Exam  Vitals and nursing note reviewed.   Constitutional:       General: He is not in acute distress.     Appearance: He is not toxic-appearing.      Interventions: Nasal cannula in place.   HENT:      Head: Normocephalic and atraumatic.      Nose: Congestion present.      Mouth/Throat:      Mouth: Mucous membranes are moist.   Eyes:      General: No scleral icterus.     Pupils: Pupils are equal, round, and reactive to light.   Neck:      Comments: Central line in place, dressing clean and intact  Cardiovascular:      Rate and Rhythm: Tachycardia present. Rhythm irregular.      Heart sounds: Normal heart sounds.   Pulmonary:      Effort: No respiratory distress.      Breath sounds: Rales present. No wheezing.      Comments: Bibasilar crackles.  Abdominal:      General: There is distension.      Palpations: Abdomen is soft.      Tenderness: There is no abdominal tenderness. There is no guarding.   Musculoskeletal:      Right lower leg: Edema present.      Left lower leg: Edema present.   Skin:     General: Skin is warm and  dry.   Neurological:      Mental Status: He is alert and oriented to person, place, and time.   Psychiatric:         Mood and Affect: Mood normal.         Behavior: Behavior normal. Behavior is cooperative.           Review of Systems    Vents:  Oxygen Concentration (%): 60 (07/16/24 0715)    Lines/Drains/Airways       Central Venous Catheter Line  Duration             Percutaneous Central Line - Triple Lumen  07/08/24 1145 Internal Jugular Right 7 days              Peripheral Intravenous Line  Duration                  Peripheral IV - Single Lumen 07/12/24 1258 18 G 1 1/4 in No Distal;Left;Posterior Forearm 3 days                    Significant Labs:    CBC/Anemia Profile:  Recent Labs   Lab 07/15/24  0352 07/16/24  0324   WBC 6.16 6.40   HGB 13.3* 13.6*   HCT 49.0 49.2    162   MCV 89 88   RDW 21.6* 22.2*        Chemistries:  Recent Labs   Lab 07/14/24  1800 07/15/24  0352 07/15/24  1218 07/16/24  0324   NA  --  141 140 140   K 3.3* 4.0 3.6 3.5   CL  --  94* 93* 91*   CO2  --  37* 38* 38*   BUN  --  59* 57* 56*   CREATININE  --  2.0* 1.9* 1.9*   CALCIUM  --  9.9 9.8 10.0   ALBUMIN  --  2.8*  --  2.8*   PROT  --  8.1  --  8.3   BILITOT  --  0.8  --  1.1*   ALKPHOS  --  145*  --  152*   ALT  --  11  --  10   AST  --  19  --  19   MG 2.0 2.0  --  1.8   PHOS  --  4.3  --  4.3       All pertinent labs within the past 24 hours have been reviewed.    Significant Imaging:  I have reviewed all pertinent imaging results/findings within the past 24 hours.

## 2024-07-16 NOTE — NURSING
CICU DAILY GOALS       A: Awake    RASS: Goal - RASS Goal: 0-->alert and calm  Actual - RASS (Carter Agitation-Sedation Scale): alert and calm   Restraint necessity:    B: Breath   SBT: Not intubated   C: Coordinate A & B, analgesics/sedatives   Pain: managed    SAT: Not intubated  D: Delirium   CAM-ICU: Overall CAM-ICU: Negative  E: Early(intubated/ Progressive (non-intubated) Mobility   MOVE Screen:      Activity: Activity Management: Ambulated in room - L4  FAS: Feeding/Nutrition   Diet order: Diet/Nutrition Received: 2 gram sodium,   Fluid restriction: Fluid Requirement: 1500 fluid restriction   Nutritional Supplement Intake: Quantity , Type:     T: Thrombus   DVT prophylaxis: VTE Required Core Measure: Pharmacological prophylaxis initiated/maintained  H: HOB Elevation   Head of Bed (HOB) Positioning: HOB at 30-45 degrees  U: Ulcer Prophylaxis   GI: yes  G: Glucose control   managed      S: Skin   Nurses Note -- 4 Eyes  Skin assessed during: Q Shift Change   CHOOSE ONE:  [x] No Altered Skin Integrity Present      [x]Prevention Measures Documented    [] Yes- Altered Skin Integrity Present or Discovered     [] LDA Added if Not in Epic (Describe Wound)     [] New Altered Skin Integrity was Present on Admit and Documented in LDA     [] Wound Image Taken  Wound Care Consulted? No  Attending Nurse:  Jim Natarajan RN/Staff Member:  ADRIEL Winslow    B: Bowel Function   no issues   I: Indwelling Catheters   Dunne necessity:     CVC necessity: Yes   IPAD offered: Not appropriate  D: De-escalation Antibx   Not on antibiotics  Plan for the day   Cardioverted  Family/Goals of care/Code Status   Code Status: Full Code     No acute events throughout day, VS and assessment per flow sheet, patient progressing towards goals as tolerated, plan of care reviewed with Yong Mcintyre and family, all concerns addressed, will continue to monitor. CVP 13, 15, 18. Successfully cardioverted out of aflutter to normal sinus rhythm.

## 2024-07-16 NOTE — ASSESSMENT & PLAN NOTE
On coumadin.    Lab Results   Component Value Date    INR 2.5 (H) 07/16/2024    INR 2.3 (H) 07/15/2024    INR 2.2 (H) 07/14/2024     -- Goal INR 2-3  -- Titrate warfarin daily    -- EP consulted for Aflutter, cardioverted 7/16

## 2024-07-16 NOTE — ASSESSMENT & PLAN NOTE
BIPAP QHS; NC during meals. Avoid HFNC.  Significant hypercapnia w/ pCO2 80-90; will start daytime BiPap    - BIPAP for ventilation preferred over high O2

## 2024-07-16 NOTE — TRANSFER OF CARE
"Anesthesia Transfer of Care Note    Patient: Yong Mcintyre    Procedure(s) Performed: Procedure(s) (LRB):  Cardioversion or Defibrillation (N/A)    Patient location: ICU    Anesthesia Type: MAC    Post pain: adequate analgesia    Post assessment: no apparent anesthetic complications and tolerated procedure well    Post vital signs: stable    Level of consciousness: awake    Nausea/Vomiting: no nausea/vomiting    Complications: none    Transfer of care protocol was followedComments: Procedure done in ICU room. No transport required, report given to ICU RN      Last vitals: Visit Vitals  BP 99/60   Pulse (!) 138   Temp 36.8 °C (98.2 °F) (Oral)   Resp (!) 24   Ht 5' 5" (1.651 m)   Wt 99.5 kg (219 lb 5.7 oz)   SpO2 (!) 90%   BMI 36.50 kg/m²     "

## 2024-07-16 NOTE — ASSESSMENT & PLAN NOTE
Patient with severe PH, last RHC in 1/2024 with Wedge of 15 (24 with exercise) and PVR of 10. Was previously on Bosentan in the past but has been off for some time due to lack of indication. Seems WHO group 2-3 moreso per Dr. Child's last note.    Regardless, minimal data for efficacy on PH specific meds in group 2 and 3 disease. Could lead to worsening VQ mismatch / left heart overload and worsen his symptoms.  - Evaluating for CTD-ILD which may be contributing to patient's PH

## 2024-07-16 NOTE — ASSESSMENT & PLAN NOTE
Patient with a history of chronic CO2 retention, has labs with elevated bicarb for years (baseline appears upper 30's) on home NIV. Patient is adherent with home O2 and BiPAP. Per notes from [facility], there was some concern for inadequate ventilatory support at home due to worsening bicarb and Hgb, however patient has had multiple hospitalizations in the past calendar year alone for right heart failure. Most recent Echo confirming very high pulmonary artery systolic pressure, and some RV dysfunction but not severely dilating to the point of impeding left heart function.    Patient's hypercapnia us multifactorial. Baseline ventilatory defect is present (severe obstruction on prior PFTs in the [facility] system) as well as sheer ventilatory restriction from significantly enlarged heart. Patient's baseline CO2 is hard to determine, but has been in the 70's with normal PH's on arterial blood gases. Patient has been appropriately aggressively diuresed during this hospitalization and may have developed a contraction alkalosis. It's likely some aspect of this increase in CO2 over the past few days is a compensatory response to his alkalosis. Has started diamox for assisted diuresis. Overall, patient has multiple severely morbid and high mortality conditions.     Patient's pCO2 improved with improvement in contraction alkalosis. CTD workup ordered. RF has returned very high. He does appear to have parenchymal abnormalities on CT persistently with what appear to be cysts. 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 There is significant mosaicism on his CT previously which could be consistent with air trapping (prior PFTs with significantly elevated RV-TLC ratio consistent with air trapping), however PVOD / PH can have similar changes.    - Would continue diuresis and agree with diamox  - Patient will require BiPAP at night / with naps. He is mentating well, and well compensated at this time so can  come off during the day as long as he is mentating well and pH is not decreasing.  - Continue bronchodilators   - Goal O2 sat is 88-92%. Patient has significant hypoxemia and requires significant support at this time.  - Will discuss CTD history with patient given elevated RF. Critical illness can cause patient's to have antibody positivity that is not consistent with CTD, however RF level is extremely high

## 2024-07-16 NOTE — PLAN OF CARE
CICU DAILY GOALS       A: Awake    RASS: Goal - RASS Goal: 0-->alert and calm  Actual - RASS (Carter Agitation-Sedation Scale): alert and calm   Restraint necessity:    B: Breath   SBT: Not intubated   C: Coordinate A & B, analgesics/sedatives   Pain: managed    SAT: Not intubated  D: Delirium   CAM-ICU: Overall CAM-ICU: Negative  E: Early(intubated/ Progressive (non-intubated) Mobility   MOVE Screen: Pass   Activity: Activity Management: Sitting at edge of bed - L2  FAS: Feeding/Nutrition   Diet order: Diet/Nutrition Received: 2 gram sodium,   Fluid restriction: Fluid Requirement: 1500 fluid restriction   Nutritional Supplement Intake: Quantity 0, Type:  NPO  T: Thrombus   DVT prophylaxis: VTE Required Core Measure: Pharmacological prophylaxis initiated/maintained  H: HOB Elevation   Head of Bed (HOB) Positioning: HOB at 30-45 degrees  U: Ulcer Prophylaxis   GI: yes  G: Glucose control   managed      S: Skin   Nurses Note -- 4 Eyes  Skin assessed during: Daily Assessment   CHOOSE ONE:  [] No Altered Skin Integrity Present      []Prevention Measures Documented    [x] Yes- Altered Skin Integrity Present or Discovered     [x] LDA Added if Not in Epic (Describe Wound)     [] New Altered Skin Integrity was Present on Admit and Documented in LDA     [] Wound Image Taken  Wound Care Consulted? No  Attending Nurse:  Antonio Natarajan RN/Staff Member:  ADRIEL Winslow    B: Bowel Function   constipation   I: Indwelling Catheters   Dunne necessity:     CVC necessity: Yes   IPAD offered: Not appropriate  D: De-escalation Antibx   No  Plan for the day   Cardioversion scheduled for today. FiO2 on BiPAP increased from 50-55 for spoO2 less than 88. Discussed with MD Ingram. See other note for event.  Family/Goals of care/Code Status   Code Status: Full Code     No acute events throughout day, VS and assessment per flow sheet, patient progressing towards goals as tolerated, plan of care reviewed with Yong Mcintyre and family, all  concerns addressed, will continue to monitor.    Problem: Adult Inpatient Plan of Care  Goal: Plan of Care Review  Outcome: Progressing

## 2024-07-16 NOTE — ASSESSMENT & PLAN NOTE
Yong Mcintyre is a 48 y.o. male with severe pulmonary HTN (Group 2-3, on 3L home O2, diagnosed prior to 2015, follows with Dr. Child at [facility], MALLIKA, Obesity hypoventilation syndrome, HFpEF, Paroxysmal AFib (on Coumadin), BMI > 35, HTN admitted on 7/5/24 for ADHF d/t worsening RV failure and LV dysfunction. EF March 2024 was 60%. New echo this admission 7/5/24 with newly reduced EF 35-40%, severe RV dysfunction. Previously evaluated by HTS and deemed not a candidate for advanced options given concerns for compliance issues and recommended palliative. While in the hospital, pt started on  5 gtt and has been requiring lasix gtt @ 40 for diuresis. He remains on high flow NC @30-40%. On admission he was in sinus rhythm and went into an atypical AFL on 7/14/24. He was been on Warfarin for paroxysmal AF. INR was subtherapeutic earlier however has been therapeutic with INR >2 since he went into AFL. He was started on Amio gtt 7/14/24 with rates in the 130-140s.       Recommendations:  - continue warfarin for cardioembolic protection.   - NPO after midnight, plan for straight DCCV tomorrow. High risk for DAWIT + has had therapeutic INRs since converting to atypical AFL on 7/14/24  - ok to continue Amio gtt for rate control for now acknowledging risk of further lung injury given his underlying pulmonary disease (asthma/COPD/severe PH)

## 2024-07-16 NOTE — ASSESSMENT & PLAN NOTE
Acute on chronic. - NYHA class III symptoms with recurrent admissions.  - He was seen by [facility] outpatient 6/2024 who state patient was feeling better on new diuretic regimen however worried about the frequency of use of metolazone and his worsening kidney function.    - Admitted with CXR with cardiomegaly and pulmonary vascular congestion, suggestive of pulmonary edema secondary to CHF. BNP elevated at 800. Creatinine worse at 3.1 on admission.  - Given his recurrent HF admissions and most recent hemodynamics, [facility] believes his overall prognosis is poor and recommended palliative care consult. Palliative care evaluated patient. Patient wishes to continue full measures at this time.     - 2 gram sodium restriction and 1500cc fluid restriction.  - Encourage physical activity with graded exercise program.  - Continue central line for CVP monitoring  - Continue Acetazolamide PO  - lasix gtt   - Continue dobutamine gtt\  - amio gtt   - Diuril as needed  - Monitor lytes BID; replete as indicated

## 2024-07-16 NOTE — SUBJECTIVE & OBJECTIVE
Interval History: JAVIER MIMS  Objective:     Vital Signs (Most Recent):  Temp: 98.2 °F (36.8 °C) (07/16/24 0800)  Pulse: 99 (07/16/24 1528)  Resp: (!) 23 (07/16/24 1528)  BP: 99/60 (07/16/24 0900)  SpO2: (!) 89 % (07/16/24 1528) Vital Signs (24h Range):  Temp:  [97.3 °F (36.3 °C)-98.3 °F (36.8 °C)] 98.2 °F (36.8 °C)  Pulse:  [] 99  Resp:  [13-35] 23  SpO2:  [84 %-95 %] 89 %  BP: ()/(51-69) 99/60     Weight: 99.5 kg (219 lb 5.7 oz)  Body mass index is 36.5 kg/m².     SpO2: (!) 89 %         Intake/Output Summary (Last 24 hours) at 7/16/2024 1804  Last data filed at 7/16/2024 1102  Gross per 24 hour   Intake 926.7 ml   Output 880 ml   Net 46.7 ml       Lines/Drains/Airways       Central Venous Catheter Line  Duration             Percutaneous Central Line - Triple Lumen  07/08/24 1145 Internal Jugular Right 8 days              Peripheral Intravenous Line  Duration                  Peripheral IV - Single Lumen 07/12/24 1258 18 G 1 1/4 in No Distal;Left;Posterior Forearm 4 days                       Physical Exam  Vitals reviewed.   Constitutional:       General: He is in acute distress.      Appearance: He is obese. He is ill-appearing.   Cardiovascular:      Rate and Rhythm: Regular rhythm. Tachycardia present.   Pulmonary:      Effort: Respiratory distress present.      Breath sounds: Rhonchi present.   Abdominal:      General: Abdomen is flat.   Musculoskeletal:      Right lower leg: Edema present.      Left lower leg: Edema present.   Skin:     General: Skin is warm.   Neurological:      Mental Status: He is alert.            Significant Labs: All pertinent lab results from the last 24 hours have been reviewed.    Significant Imaging: Echocardiogram: 2D echo with color flow doppler: No results found. However, due to the size of the patient record, not all encounters were searched. Please check Results Review for a complete set of results.

## 2024-07-16 NOTE — PLAN OF CARE
Advance Care Planning   [facility] - Cardiac Intensive Care  Palliative Care       Patient Name: Yong Mcintyre  MRN: [id number]  Admission Date: 7/4/2024  Hospital Length of Stay: 12 days  Code Status: Full Code   Attending Provider: Rossy Leary MD  Palliative Care Provider:   Primary Care Physician: Gianni Escalona MD  Principal Problem:Atypical atrial flutter     LCSW attempted to meet with pt 2x, however, pt eating lunch. LCSW communicated with primary team regarding POC. Per team, pt not amenable to palliative med conversations at this time. Will continue to follow peripherally for arising needs.     Sybil Godfrey, SANDRA-BACS, ACHP-SW  Department of Palliative Medicine

## 2024-07-16 NOTE — NURSING
Pt c/o midsternal chest tightness, non-radiating.  Endorses mild dyspnea, palpitations, gas.   SpO2 88-90% BiPAP 55% 18/11.  Denies nausea, diaphoresis.   12-lead EKG below.  Discussed with MD Ingram.  New order for GI cocktail.

## 2024-07-17 LAB
ALBUMIN SERPL BCP-MCNC: 2.6 G/DL (ref 3.5–5.2)
ALLENS TEST: ABNORMAL
ALLENS TEST: NORMAL
ALP SERPL-CCNC: 143 U/L (ref 55–135)
ALT SERPL W/O P-5'-P-CCNC: 9 U/L (ref 10–44)
ANION GAP SERPL CALC-SCNC: 10 MMOL/L (ref 8–16)
ANION GAP SERPL CALC-SCNC: 6 MMOL/L (ref 8–16)
ANION GAP SERPL CALC-SCNC: 9 MMOL/L (ref 8–16)
AST SERPL-CCNC: 18 U/L (ref 10–40)
BASOPHILS # BLD AUTO: 0.03 K/UL (ref 0–0.2)
BASOPHILS NFR BLD: 0.5 % (ref 0–1.9)
BILIRUB SERPL-MCNC: 0.9 MG/DL (ref 0.1–1)
BUN SERPL-MCNC: 49 MG/DL (ref 6–20)
BUN SERPL-MCNC: 49 MG/DL (ref 6–20)
BUN SERPL-MCNC: 51 MG/DL (ref 6–20)
CALCIUM SERPL-MCNC: 9.3 MG/DL (ref 8.7–10.5)
CALCIUM SERPL-MCNC: 9.6 MG/DL (ref 8.7–10.5)
CALCIUM SERPL-MCNC: 9.7 MG/DL (ref 8.7–10.5)
CHLORIDE SERPL-SCNC: 90 MMOL/L (ref 95–110)
CHLORIDE SERPL-SCNC: 91 MMOL/L (ref 95–110)
CHLORIDE SERPL-SCNC: 93 MMOL/L (ref 95–110)
CO2 SERPL-SCNC: 37 MMOL/L (ref 23–29)
CO2 SERPL-SCNC: 39 MMOL/L (ref 23–29)
CO2 SERPL-SCNC: 42 MMOL/L (ref 23–29)
CREAT SERPL-MCNC: 1.7 MG/DL (ref 0.5–1.4)
CREAT SERPL-MCNC: 1.9 MG/DL (ref 0.5–1.4)
CREAT SERPL-MCNC: 2.1 MG/DL (ref 0.5–1.4)
DELSYS: ABNORMAL
DELSYS: NORMAL
DIFFERENTIAL METHOD BLD: ABNORMAL
EOSINOPHIL # BLD AUTO: 0 K/UL (ref 0–0.5)
EOSINOPHIL NFR BLD: 0 % (ref 0–8)
EP: 8
ERYTHROCYTE [DISTWIDTH] IN BLOOD BY AUTOMATED COUNT: 21.7 % (ref 11.5–14.5)
ERYTHROCYTE [SEDIMENTATION RATE] IN BLOOD BY WESTERGREN METHOD: 26 MM/H
EST. GFR  (NO RACE VARIABLE): 38.1 ML/MIN/1.73 M^2
EST. GFR  (NO RACE VARIABLE): 43 ML/MIN/1.73 M^2
EST. GFR  (NO RACE VARIABLE): 49.1 ML/MIN/1.73 M^2
FIO2: 50
FIO2: 50
FIO2: 60
FLOW: 60
GLUCOSE SERPL-MCNC: 103 MG/DL (ref 70–110)
GLUCOSE SERPL-MCNC: 112 MG/DL (ref 70–110)
GLUCOSE SERPL-MCNC: 114 MG/DL (ref 70–110)
HBV CORE AB SERPL QL IA: NORMAL
HBV SURFACE AB SER-ACNC: <3 MIU/ML
HBV SURFACE AB SER-ACNC: NORMAL M[IU]/ML
HBV SURFACE AG SERPL QL IA: NORMAL
HCO3 UR-SCNC: 41.2 MMOL/L (ref 24–28)
HCO3 UR-SCNC: 43.6 MMOL/L (ref 24–28)
HCO3 UR-SCNC: 44.4 MMOL/L (ref 24–28)
HCT VFR BLD AUTO: 45.4 % (ref 40–54)
HCT VFR BLD CALC: 47 %PCV (ref 36–54)
HCV AB SERPL QL IA: NORMAL
HGB BLD-MCNC: 12.6 G/DL (ref 14–18)
IMM GRANULOCYTES # BLD AUTO: 0.01 K/UL (ref 0–0.04)
IMM GRANULOCYTES NFR BLD AUTO: 0.2 % (ref 0–0.5)
INR PPP: 2.5 (ref 0.8–1.2)
IP: 15
LYMPHOCYTES # BLD AUTO: 1.1 K/UL (ref 1–4.8)
LYMPHOCYTES NFR BLD: 19.2 % (ref 18–48)
MAGNESIUM SERPL-MCNC: 2.1 MG/DL (ref 1.6–2.6)
MCH RBC QN AUTO: 24.2 PG (ref 27–31)
MCHC RBC AUTO-ENTMCNC: 27.8 G/DL (ref 32–36)
MCV RBC AUTO: 87 FL (ref 82–98)
MIN VOL: 10.6
MIN VOL: 16.5
MIN VOL: 5.1
MIN VOL: 9.2
MODE: ABNORMAL
MODE: NORMAL
MONOCYTES # BLD AUTO: 0.4 K/UL (ref 0.3–1)
MONOCYTES NFR BLD: 8.1 % (ref 4–15)
NEUTROPHILS # BLD AUTO: 3.9 K/UL (ref 1.8–7.7)
NEUTROPHILS NFR BLD: 72 % (ref 38–73)
NRBC BLD-RTO: 0 /100 WBC
PCO2 BLDA: 70.1 MMHG (ref 35–45)
PCO2 BLDA: 80.9 MMHG (ref 35–45)
PCO2 BLDA: 85.7 MMHG (ref 35–45)
PH SMN: 7.31 [PH] (ref 7.35–7.45)
PH SMN: 7.35 [PH] (ref 7.35–7.45)
PH SMN: 7.38 [PH] (ref 7.35–7.45)
PHOSPHATE SERPL-MCNC: 4.5 MG/DL (ref 2.7–4.5)
PLATELET # BLD AUTO: 144 K/UL (ref 150–450)
PMV BLD AUTO: 10.2 FL (ref 9.2–12.9)
PO2 BLDA: 32 MMHG (ref 40–60)
PO2 BLDA: 35 MMHG (ref 40–60)
PO2 BLDA: 42 MMHG (ref 40–60)
PO2 BLDA: 45 MMHG (ref 40–60)
PO2 BLDA: 74 MMHG (ref 80–100)
POC BE: 16 MMOL/L
POC BE: 17 MMOL/L
POC BE: 19 MMOL/L
POC IONIZED CALCIUM: 1.2 MMOL/L (ref 1.06–1.42)
POC SATURATED O2: 55 % (ref 95–100)
POC SATURATED O2: 59 % (ref 95–100)
POC SATURATED O2: 70 % (ref 95–100)
POC SATURATED O2: 74 % (ref 95–100)
POC SATURATED O2: 93 % (ref 95–100)
POC TCO2: 43 MMOL/L (ref 23–27)
POC TCO2: 46 MMOL/L (ref 24–29)
POC TCO2: 47 MMOL/L (ref 24–29)
POTASSIUM BLD-SCNC: 3.3 MMOL/L (ref 3.5–5.1)
POTASSIUM SERPL-SCNC: 3.3 MMOL/L (ref 3.5–5.1)
POTASSIUM SERPL-SCNC: 3.6 MMOL/L (ref 3.5–5.1)
POTASSIUM SERPL-SCNC: 3.6 MMOL/L (ref 3.5–5.1)
PROT SERPL-MCNC: 7.5 G/DL (ref 6–8.4)
PROTHROMBIN TIME: 25.5 SEC (ref 9–12.5)
RBC # BLD AUTO: 5.21 M/UL (ref 4.6–6.2)
SAMPLE: ABNORMAL
SAMPLE: NORMAL
SITE: ABNORMAL
SITE: NORMAL
SODIUM BLD-SCNC: 136 MMOL/L (ref 136–145)
SODIUM SERPL-SCNC: 138 MMOL/L (ref 136–145)
SODIUM SERPL-SCNC: 139 MMOL/L (ref 136–145)
SODIUM SERPL-SCNC: 140 MMOL/L (ref 136–145)
SP02: 91
SP02: 93
SPONT RATE: 26
SPONT RATE: 27
SPONT RATE: 27
SPONT RATE: 28
WBC # BLD AUTO: 5.46 K/UL (ref 3.9–12.7)

## 2024-07-17 PROCEDURE — 25000003 PHARM REV CODE 250

## 2024-07-17 PROCEDURE — 83520 IMMUNOASSAY QUANT NOS NONAB: CPT | Performed by: STUDENT IN AN ORGANIZED HEALTH CARE EDUCATION/TRAINING PROGRAM

## 2024-07-17 PROCEDURE — 94660 CPAP INITIATION&MGMT: CPT

## 2024-07-17 PROCEDURE — 86235 NUCLEAR ANTIGEN ANTIBODY: CPT | Mod: 59 | Performed by: STUDENT IN AN ORGANIZED HEALTH CARE EDUCATION/TRAINING PROGRAM

## 2024-07-17 PROCEDURE — 25000003 PHARM REV CODE 250: Performed by: STUDENT IN AN ORGANIZED HEALTH CARE EDUCATION/TRAINING PROGRAM

## 2024-07-17 PROCEDURE — 99233 SBSQ HOSP IP/OBS HIGH 50: CPT | Mod: GC,,, | Performed by: INTERNAL MEDICINE

## 2024-07-17 PROCEDURE — 84165 PROTEIN E-PHORESIS SERUM: CPT | Performed by: STUDENT IN AN ORGANIZED HEALTH CARE EDUCATION/TRAINING PROGRAM

## 2024-07-17 PROCEDURE — 63600175 PHARM REV CODE 636 W HCPCS: Performed by: INTERNAL MEDICINE

## 2024-07-17 PROCEDURE — 94799 UNLISTED PULMONARY SVC/PX: CPT

## 2024-07-17 PROCEDURE — 80048 BASIC METABOLIC PNL TOTAL CA: CPT | Mod: XB | Performed by: INTERNAL MEDICINE

## 2024-07-17 PROCEDURE — 86706 HEP B SURFACE ANTIBODY: CPT | Mod: 91 | Performed by: STUDENT IN AN ORGANIZED HEALTH CARE EDUCATION/TRAINING PROGRAM

## 2024-07-17 PROCEDURE — 36600 WITHDRAWAL OF ARTERIAL BLOOD: CPT

## 2024-07-17 PROCEDURE — 82330 ASSAY OF CALCIUM: CPT

## 2024-07-17 PROCEDURE — 84165 PROTEIN E-PHORESIS SERUM: CPT | Mod: 26,,, | Performed by: PATHOLOGY

## 2024-07-17 PROCEDURE — 85610 PROTHROMBIN TIME: CPT

## 2024-07-17 PROCEDURE — 63600175 PHARM REV CODE 636 W HCPCS

## 2024-07-17 PROCEDURE — 94761 N-INVAS EAR/PLS OXIMETRY MLT: CPT | Mod: XB

## 2024-07-17 PROCEDURE — 80048 BASIC METABOLIC PNL TOTAL CA: CPT | Mod: 91,XB | Performed by: STUDENT IN AN ORGANIZED HEALTH CARE EDUCATION/TRAINING PROGRAM

## 2024-07-17 PROCEDURE — 87340 HEPATITIS B SURFACE AG IA: CPT | Performed by: STUDENT IN AN ORGANIZED HEALTH CARE EDUCATION/TRAINING PROGRAM

## 2024-07-17 PROCEDURE — 84132 ASSAY OF SERUM POTASSIUM: CPT

## 2024-07-17 PROCEDURE — 86803 HEPATITIS C AB TEST: CPT | Performed by: STUDENT IN AN ORGANIZED HEALTH CARE EDUCATION/TRAINING PROGRAM

## 2024-07-17 PROCEDURE — 84100 ASSAY OF PHOSPHORUS: CPT

## 2024-07-17 PROCEDURE — 83516 IMMUNOASSAY NONANTIBODY: CPT | Performed by: STUDENT IN AN ORGANIZED HEALTH CARE EDUCATION/TRAINING PROGRAM

## 2024-07-17 PROCEDURE — 20000000 HC ICU ROOM

## 2024-07-17 PROCEDURE — 99900035 HC TECH TIME PER 15 MIN (STAT)

## 2024-07-17 PROCEDURE — 83735 ASSAY OF MAGNESIUM: CPT

## 2024-07-17 PROCEDURE — 86704 HEP B CORE ANTIBODY TOTAL: CPT | Performed by: STUDENT IN AN ORGANIZED HEALTH CARE EDUCATION/TRAINING PROGRAM

## 2024-07-17 PROCEDURE — 99223 1ST HOSP IP/OBS HIGH 75: CPT | Mod: GC,,, | Performed by: INTERNAL MEDICINE

## 2024-07-17 PROCEDURE — 85014 HEMATOCRIT: CPT

## 2024-07-17 PROCEDURE — 84295 ASSAY OF SERUM SODIUM: CPT

## 2024-07-17 PROCEDURE — 82800 BLOOD PH: CPT

## 2024-07-17 PROCEDURE — 63600175 PHARM REV CODE 636 W HCPCS: Performed by: STUDENT IN AN ORGANIZED HEALTH CARE EDUCATION/TRAINING PROGRAM

## 2024-07-17 PROCEDURE — 85025 COMPLETE CBC W/AUTO DIFF WBC: CPT

## 2024-07-17 PROCEDURE — 94640 AIRWAY INHALATION TREATMENT: CPT

## 2024-07-17 PROCEDURE — 86146 BETA-2 GLYCOPROTEIN ANTIBODY: CPT | Mod: 59 | Performed by: STUDENT IN AN ORGANIZED HEALTH CARE EDUCATION/TRAINING PROGRAM

## 2024-07-17 PROCEDURE — 99231 SBSQ HOSP IP/OBS SF/LOW 25: CPT | Mod: GC,,, | Performed by: INTERNAL MEDICINE

## 2024-07-17 PROCEDURE — 27100171 HC OXYGEN HIGH FLOW UP TO 24 HOURS

## 2024-07-17 PROCEDURE — 86235 NUCLEAR ANTIGEN ANTIBODY: CPT | Performed by: STUDENT IN AN ORGANIZED HEALTH CARE EDUCATION/TRAINING PROGRAM

## 2024-07-17 PROCEDURE — 86147 CARDIOLIPIN ANTIBODY EA IG: CPT | Mod: 59 | Performed by: STUDENT IN AN ORGANIZED HEALTH CARE EDUCATION/TRAINING PROGRAM

## 2024-07-17 PROCEDURE — 80053 COMPREHEN METABOLIC PANEL: CPT

## 2024-07-17 PROCEDURE — 82803 BLOOD GASES ANY COMBINATION: CPT

## 2024-07-17 RX ORDER — ACETAZOLAMIDE 250 MG/1
500 TABLET ORAL 2 TIMES DAILY
Status: COMPLETED | OUTPATIENT
Start: 2024-07-17 | End: 2024-07-19

## 2024-07-17 RX ORDER — AMIODARONE HYDROCHLORIDE 200 MG/1
400 TABLET ORAL 2 TIMES DAILY
Status: COMPLETED | OUTPATIENT
Start: 2024-07-17 | End: 2024-07-26

## 2024-07-17 RX ORDER — POTASSIUM CHLORIDE 20 MEQ/1
40 TABLET, EXTENDED RELEASE ORAL ONCE
Status: DISCONTINUED | OUTPATIENT
Start: 2024-07-17 | End: 2024-07-17

## 2024-07-17 RX ORDER — POTASSIUM CHLORIDE 20 MEQ/1
40 TABLET, EXTENDED RELEASE ORAL ONCE
Status: COMPLETED | OUTPATIENT
Start: 2024-07-17 | End: 2024-07-17

## 2024-07-17 RX ORDER — AMIODARONE HYDROCHLORIDE 200 MG/1
200 TABLET ORAL DAILY
Status: DISCONTINUED | OUTPATIENT
Start: 2024-07-27 | End: 2024-07-29 | Stop reason: HOSPADM

## 2024-07-17 RX ORDER — POTASSIUM CHLORIDE 29.8 MG/ML
40 INJECTION INTRAVENOUS ONCE
Status: COMPLETED | OUTPATIENT
Start: 2024-07-17 | End: 2024-07-17

## 2024-07-17 RX ADMIN — MUPIROCIN: 20 OINTMENT TOPICAL at 08:07

## 2024-07-17 RX ADMIN — FLUTICASONE PROPIONATE AND SALMETEROL 1 PUFF: 50; 250 POWDER RESPIRATORY (INHALATION) at 07:07

## 2024-07-17 RX ADMIN — QUETIAPINE FUMARATE 50 MG: 25 TABLET ORAL at 08:07

## 2024-07-17 RX ADMIN — POTASSIUM CHLORIDE 40 MEQ: 1500 TABLET, EXTENDED RELEASE ORAL at 05:07

## 2024-07-17 RX ADMIN — ALBUTEROL SULFATE 2 PUFF: 108 INHALANT RESPIRATORY (INHALATION) at 01:07

## 2024-07-17 RX ADMIN — AMIODARONE HYDROCHLORIDE 400 MG: 200 TABLET ORAL at 01:07

## 2024-07-17 RX ADMIN — POTASSIUM CHLORIDE 40 MEQ: 29.8 INJECTION, SOLUTION INTRAVENOUS at 07:07

## 2024-07-17 RX ADMIN — FLUTICASONE PROPIONATE AND SALMETEROL 1 PUFF: 50; 250 POWDER RESPIRATORY (INHALATION) at 08:07

## 2024-07-17 RX ADMIN — DEXTROSE 500 MG: 50 INJECTION, SOLUTION INTRAVENOUS at 01:07

## 2024-07-17 RX ADMIN — ACETAMINOPHEN 650 MG: 325 TABLET ORAL at 08:07

## 2024-07-17 RX ADMIN — PANTOPRAZOLE SODIUM 40 MG: 40 TABLET, DELAYED RELEASE ORAL at 08:07

## 2024-07-17 RX ADMIN — CHLOROTHIAZIDE SODIUM 250 MG: 500 INJECTION, POWDER, LYOPHILIZED, FOR SOLUTION INTRAVENOUS at 01:07

## 2024-07-17 RX ADMIN — ALBUTEROL SULFATE 2 PUFF: 108 INHALANT RESPIRATORY (INHALATION) at 06:07

## 2024-07-17 RX ADMIN — ALBUTEROL SULFATE 2 PUFF: 108 INHALANT RESPIRATORY (INHALATION) at 07:07

## 2024-07-17 RX ADMIN — FUROSEMIDE 40 MG/HR: 10 INJECTION, SOLUTION INTRAMUSCULAR; INTRAVENOUS at 04:07

## 2024-07-17 RX ADMIN — MELATONIN TAB 3 MG 6 MG: 3 TAB at 08:07

## 2024-07-17 RX ADMIN — WARFARIN SODIUM 5 MG: 5 TABLET ORAL at 05:07

## 2024-07-17 RX ADMIN — SENNOSIDES AND DOCUSATE SODIUM 1 TABLET: 50; 8.6 TABLET ORAL at 08:07

## 2024-07-17 RX ADMIN — AMIODARONE HYDROCHLORIDE 0.5 MG/MIN: 1.8 INJECTION, SOLUTION INTRAVENOUS at 09:07

## 2024-07-17 RX ADMIN — ALLOPURINOL 300 MG: 100 TABLET ORAL at 08:07

## 2024-07-17 RX ADMIN — ACETAZOLAMIDE 500 MG: 250 TABLET ORAL at 08:07

## 2024-07-17 RX ADMIN — AMIODARONE HYDROCHLORIDE 400 MG: 200 TABLET ORAL at 08:07

## 2024-07-17 NOTE — PROGRESS NOTES
[facility] - Cardiac Intensive Care  Cardiology  Progress Note    Patient Name: Yong Mcintyre  MRN: [id number]  Admission Date: 7/4/2024  Hospital Length of Stay: 13 days  Code Status: Full Code   Attending Physician: Rossy Leary MD   Primary Care Physician: Gianni Escalona MD  Expected Discharge Date: 7/22/2024  Principal Problem:Atypical atrial flutter    Subjective:     Hospital Course:   The patient was admitted to the CCU for further management of right-sided heart failure. He was diuresed with a lasix gtt. Electrolytes were monitored and repleted as indicated. Palliative care was consulted given his poor prognosis. The patient had significant O2 requirements on admission which were slow to wean. A central line was placed for close CVP monitoring in the setting of aggressive diuresis. Nutrition consult placed for low salt diet education. Acetazolamide was added to his diuretic regimen due to persistent metabolic alkalosis. The patient required intermittent Bipap due to hypercapnic respiratory failure. Dobutamine was added to augment cardiac output to further diurese patient. The patient was in aflutter, EP was consulted. Plan for cardioversion 7/16/24. Pulm consulted for hypercapnia. Per pulm ok to remain on HFNC. Presentation concerning for end stage RA causing pum HTN, rheum consulted. No therapies available, but rheum has ordered work up. Switched from IV to PO amio. Continued diamox.     Interval History: NAEON. Continued on HFNC     ROS  Objective:     Vital Signs (Most Recent):  Temp: 97.4 °F (36.3 °C) (07/17/24 1100)  Pulse: 97 (07/17/24 1557)  Resp: 16 (07/17/24 1557)  BP: (!) 101/59 (07/17/24 1100)  SpO2: (!) 92 % (07/17/24 1557) Vital Signs (24h Range):  Temp:  [96.9 °F (36.1 °C)-98 °F (36.7 °C)] 97.4 °F (36.3 °C)  Pulse:  [] 97  Resp:  [12-33] 16  SpO2:  [61 %-96 %] 92 %  BP: ()/(58-77) 101/59     Weight: 103 kg (227 lb 1.2 oz)  Body mass index is 37.79 kg/m².     SpO2: (!) 92  %         Intake/Output Summary (Last 24 hours) at 7/17/2024 1610  Last data filed at 7/17/2024 1500  Gross per 24 hour   Intake 2229.65 ml   Output 3450 ml   Net -1220.35 ml       Lines/Drains/Airways       Central Venous Catheter Line  Duration             Percutaneous Central Line - Triple Lumen  07/08/24 1145 Internal Jugular Right 9 days              Drain  Duration             Male External Urinary Catheter 07/17/24 0030 Small <1 day              Peripheral Intravenous Line  Duration                  Peripheral IV - Single Lumen 07/16/24 1900 18 G Anterior;Right Forearm <1 day                       Physical Exam  Vitals reviewed.   Constitutional:       General: He is not in acute distress.     Appearance: He is obese. He is ill-appearing.   Cardiovascular:      Rate and Rhythm: Regular rhythm. Tachycardia present.   Pulmonary:      Effort: No respiratory distress.      Breath sounds: No rhonchi.   Abdominal:      General: Abdomen is flat.   Musculoskeletal:      Right lower leg: No edema.      Left lower leg: No edema.   Skin:     General: Skin is warm.   Neurological:      Mental Status: He is alert.            Significant Labs: All pertinent lab results from the last 24 hours have been reviewed.    Significant Imaging: Echocardiogram: 2D echo with color flow doppler: No results found. However, due to the size of the patient record, not all encounters were searched. Please check Results Review for a complete set of results.  Assessment and Plan:       * Atypical atrial flutter  - New onset  - On warfarin for Afib  - Patient on warfarin but has been subtherapeutic, will need to assess risk of cardioversion vs benefit to avoid HF in a patient with HF and tachycardia    - Continue AC as per Afib  - Started Amiodarone for rhythm control  - Monitor electrolytes  -continued in Aflutter consulted EP, s/p DCCV/DAWIT on 7/16      Metabolic alkalosis  Bicarb 40's in setting of aggressive diuresis. Suspect contraction  alkalosis.    - Continue diamox  - Trend Bicarb    Acute on chronic respiratory failure with hypoxia  Patient with Hypercapnic and Hypoxic Respiratory failure which is Acute on chronic.  he is on home oxygen at 3 LPM. Supplemental oxygen was provided and noted- Oxygen Concentration (%):  [50-60] 60    Signs/symptoms of respiratory failure include- tachypnea and increased work of breathing. Contributing diagnoses includes - CHF Labs and images were reviewed. Patient Has recent ABG, which has been reviewed.    -- Trend VBG BID  -- BiPap during night and naps; okay for HFNC throughout the day per pulm  -- Goal SpO2 > 88%    Acute on chronic right-sided congestive heart failure  - See 'RV dysfunction'    Paroxysmal A-fib  xOn coumadin.    Lab Results   Component Value Date    INR 2.5 (H) 07/17/2024    INR 2.5 (H) 07/16/2024    INR 2.3 (H) 07/15/2024     -- Goal INR 2-3  -- Titrate warfarin daily    -- EP consulted for Aflutter, cardioverted 7/16    Right ventricular dysfunction  Acute on chronic. - NYHA class III symptoms with recurrent admissions.  - He was seen by [facility] outpatient 6/2024 who state patient was feeling better on new diuretic regimen however worried about the frequency of use of metolazone and his worsening kidney function.    - Admitted with CXR with cardiomegaly and pulmonary vascular congestion, suggestive of pulmonary edema secondary to CHF. BNP elevated at 800. Creatinine worse at 3.1 on admission.  - Given his recurrent HF admissions and most recent hemodynamics, [facility] believes his overall prognosis is poor and recommended palliative care consult. Palliative care evaluated patient. Patient wishes to continue full measures at this time.     - 2 gram sodium restriction and 1500cc fluid restriction.  - Encourage physical activity with graded exercise program.  - Continue central line for CVP monitoring  - Continue Acetazolamide PO  - lasix gtt   - Continue dobutamine gtt  - amio PO  - Diuril as needed  -  Monitor lytes BID; replete as indicated    Palliative care encounter  - patient not amenable to palliative options     MALLIKA (obstructive sleep apnea)  See 'hypoventilation syndrome'    Pulmonary hypertension  WHO group 2-3 per his managing pulmonologist ([facility]ell at [facility])  Adherent with diet, CPAP, and on continuous O2 3L at home.     -- RV dysfunction treatment as above  -- Pulm consulted, recs appreciated   -- Concern for RA per pulm, rheum consulted    Hypoventilation syndrome  BIPAP QHS; NC during meals. Avoid HFNC.  Significant hypercapnia w/ pCO2 80-90; will start daytime BiPap    - BIPAP for ventilation preferred over high O2          VTE Risk Mitigation (From admission, onward)           Ordered     warfarin (COUMADIN) tablet 5 mg  Daily         07/12/24 0751     IP VTE HIGH RISK PATIENT  Once         07/04/24 2302     Place sequential compression device  Until discontinued         07/04/24 2302                    Sheeba Baum DO  Cardiology  [facility] - Cardiac Intensive Care

## 2024-07-17 NOTE — ASSESSMENT & PLAN NOTE
Patient with a history of chronic CO2 retention, has labs with elevated bicarb for years (baseline appears upper 30's) on home NIV. Patient is adherent with home O2 and BiPAP. Per notes from [facility], there was some concern for inadequate ventilatory support at home due to worsening bicarb and Hgb, however patient has had multiple hospitalizations in the past calendar year alone for right heart failure. Most recent Echo confirming very high pulmonary artery systolic pressure, and some RV dysfunction but not severely dilating to the point of impeding left heart function. Patient's hypercapnia is multifactorial. Baseline ventilatory defect is present (severe obstruction on prior PFTs in the [facility] system) as well as sheer ventilatory restriction from significantly enlarged heart. Patient's baseline CO2 is hard to determine, but has been in the 70's with normal PH's on arterial blood gases. Patient has been appropriately aggressively diuresed during this hospitalization and may have developed a contraction alkalosis. It's likely some aspect of this increase in CO2 over the past few days is a compensatory response to his alkalosis. Has started diamox for assisted diuresis. Overall, patient has multiple severely morbid and high mortality conditions.     - ABG on 7/17 with normal pH of 7.37 and pCO2 of 70. Patient's pCO2 is at its baseline, patient with normal pH and pCO2 in the upper 70's and lower 80's on venous blood gas. This is a sign of well compensated chronic hypercapnic respiratory failure. Patient does not need continuous BiPAP, will only need it while he is sleeping. My bigger concern is patient's oxygenation, rather than his ventilation. He is requiring a high amount of support to maintain appropriate saturations. The patient's goal O2 saturation should be 88-92% and we should utilize whatever oxygen delivery system is needed to achieve those goals, including Comfort flow / high flow nasal cannula. Hypoxemia will  worsen patient's pulmonary hypertension.    - CTD workup ordered. RF has returned very high. He does appear to have parenchymal abnormalities on CT persistently with what appear to be cysts. 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 There is significant mosaicism on his CT previously which could be consistent with air trapping (prior PFTs with significantly elevated RV-TLC ratio consistent with air trapping), however PVOD / PH can have similar changes.    Recommendations  - Would continue diuresis and agree with diamox  - BiPAP at night and with naps, otherwise OK for high-flow nasal cannula throughout the day. Would agree with keeping liters set to 60 and decrease FiO2 as able  - Goal sat is 88-92%  - Continue bronchodilators   - Recommend rheumatology consultation for evaluation of RA. EULAR score would be definite for RA with 2 joint involvement, patient is unclear about intermittent hand stiffness for the past few years. Reports his grandfather had arthritis, unsure about RA. It sounds more consistent with osteoarthritis but he reported some hand deformity. Overall unclear and potential RA / CTD diagnosis could give targetable disease process to treat for hypoxemia and PH.

## 2024-07-17 NOTE — ASSESSMENT & PLAN NOTE
xOn coumadin.    Lab Results   Component Value Date    INR 2.5 (H) 07/17/2024    INR 2.5 (H) 07/16/2024    INR 2.3 (H) 07/15/2024     -- Goal INR 2-3  -- Titrate warfarin daily    -- EP consulted for Aflutter, cardioverted 7/16

## 2024-07-17 NOTE — SUBJECTIVE & OBJECTIVE
Interval History:  Pt on high-flow NC. States he feels the feels better today, improved breathing      Past Medical History:   Diagnosis Date    Arthritis     CHF (congestive heart failure)     Diastolic dysfunction    Cor pulmonale 11/05/2012    Gallstones     GERD (gastroesophageal reflux disease)     Hypertension     Morbid obesity 11/05/2012    Obesity hypoventilation syndrome     On home oxygen therapy 03/07/2014    MALLIKA (obstructive sleep apnea)     BPAP 16/11 with 3 L at night (continuous O2).    Paroxysmal atrial fibrillation     PFO (patent foramen ovale) 11/05/2012    status post closure    Pickwickian syndrome 11/05/2012    Pulmonary hypertension        Past Surgical History:   Procedure Laterality Date    CHOLECYSTECTOMY      RIGHT HEART CATHETERIZATION Right 03/22/2022    Procedure: INSERTION, CATHETER, RIGHT HEART;  Surgeon: Tony Martínez MD;  Location: [facility] CATH LAB;  Service: Cardiology;  Laterality: Right;    RIGHT HEART CATHETERIZATION Right 01/31/2024    Procedure: INSERTION, CATHETER, RIGHT HEART;  Surgeon: Sheri Ritter MD;  Location: [facility] CATH LAB;  Service: Cardiology;  Laterality: Right;    UPPER GASTROINTESTINAL ENDOSCOPY      2-3 years ago       Immunization History   Administered Date(s) Administered    COVID-19, MRNA, LN-S, PF (MODERNA FULL 0.5 ML DOSE) 09/30/2022    COVID-19, MRNA, LN-S, PF (Pfizer) (Purple Cap) 03/09/2021, 03/30/2021, 10/18/2021    COVID-19, mRNA, LNP-S, bivalent booster, PF (PFIZER OMICRON) 09/30/2022    Influenza 11/07/2018, 04/09/2019, 09/18/2020    Influenza (Flumist) - Quadrivalent - Intranasal *Preferred* (2-49 years old) 10/16/2014    Influenza - Quadrivalent 10/16/2014    Influenza - Quadrivalent - MDCK - PF 10/31/2018    Influenza - Quadrivalent - PF *Preferred* (6 months and older) 10/14/2015, 10/27/2016, 10/23/2017, 10/24/2019, 09/16/2020, 11/16/2021, 09/30/2022, 12/15/2023    Influenza - Trivalent (ADULT) 11/06/2014    Influenza - Trivalent - PF (ADULT)  "11/06/2014, 10/14/2015, 10/27/2016, 10/23/2017, 10/24/2019, 09/16/2020, 11/16/2021    Pneumococcal Polysaccharide - 23 Valent 10/23/2017    Varicella 11/06/2014       Review of patient's allergies indicates:   Allergen Reactions    Lipitor [atorvastatin] Other (See Comments)     "it makes my legs feel like jelly"  Other reaction(s): causes legs to hurt     Current Facility-Administered Medications   Medication Frequency    0.9%  NaCl infusion PRN    0.9%  NaCl infusion PRN    acetaminophen tablet 650 mg Q6H PRN    acetaZOLAMIDE tablet 500 mg BID    albuterol inhaler 2 puff Q4H PRN    albuterol inhaler 2 puff Q6H    allopurinoL tablet 300 mg Daily    aluminum & magnesium hydroxide-simethicone 400-400-40 mg/5 mL suspension 30 mL Q6H PRN    amiodarone tablet 400 mg BID    Followed by    [START ON 7/27/2024] amiodarone tablet 200 mg Daily    DOBUtamine 1000 mg in D5W 250 mL infusion Continuous    fluticasone-salmeterol 250-50 mcg/dose diskus inhaler 1 puff BID    furosemide (Lasix) 500 mg in 50 mL infusion (conc: 10 mg/mL) Continuous    glycerin adult suppository 1 suppository Daily PRN    melatonin tablet 6 mg Nightly PRN    mupirocin 2 % ointment BID    pantoprazole EC tablet 40 mg Daily    quetiapine split tablet 50 mg QHS    senna-docusate 8.6-50 mg per tablet 1 tablet Daily    sodium chloride 0.9% flush 10 mL PRN    warfarin (COUMADIN) tablet 5 mg Daily     Family History       Problem Relation (Age of Onset)    Arthritis Mother, Maternal Grandmother, Maternal Grandfather    Asthma Mother, Sister, Maternal Grandmother    Breast cancer Paternal Grandmother    Diabetes Maternal Grandmother    Down syndrome Brother    Gout Brother    Hypertension Father, Maternal Grandmother, Maternal Grandfather, Paternal Grandfather          Tobacco Use    Smoking status: Never    Smokeless tobacco: Never   Substance and Sexual Activity    Alcohol use: Yes     Comment: holidays  rare    Drug use: No    Sexual activity: Not Currently "     Partners: Female     Objective:     Vital Signs (Most Recent):  Temp: 97.4 °F (36.3 °C) (07/17/24 1100)  Pulse: 102 (07/17/24 1355)  Resp: 20 (07/17/24 1355)  BP: (!) 101/59 (07/17/24 1100)  SpO2: (!) 93 % (07/17/24 1355) Vital Signs (24h Range):  Temp:  [96.9 °F (36.1 °C)-98 °F (36.7 °C)] 97.4 °F (36.3 °C)  Pulse:  [] 102  Resp:  [12-33] 20  SpO2:  [61 %-96 %] 93 %  BP: ()/(58-77) 101/59     Weight: 103 kg (227 lb 1.2 oz) (07/17/24 0318)  Body mass index is 37.79 kg/m².  Body surface area is 2.17 meters squared.      Intake/Output Summary (Last 24 hours) at 7/17/2024 1455  Last data filed at 7/17/2024 1200  Gross per 24 hour   Intake 1794.02 ml   Output 2775 ml   Net -980.98 ml         Physical Exam   Constitutional: He is oriented to person, place, and time. He appears well-developed, well-nourished and obese. No distress. He appears ill.   HENT:   Head: Normocephalic and atraumatic.   Right Ear: External ear normal.   Left Ear: External ear normal.   Nose: Nose normal. No nasal congestion.   Mouth/Throat: Mucous membranes are moist. Oropharynx is clear.   Eyes: Conjunctivae are normal.   Cardiovascular: Normal rate and regular rhythm.   Pulmonary/Chest:   Pulmonary Comments: On high leo O2 on f/u eval. Diminished breath sounds bilaterally, some basilar crackles present.  Abdominal: Soft. He exhibits no distension. There is no abdominal tenderness.   Musculoskeletal:         General: No tenderness. Normal range of motion.      Cervical back: Normal range of motion and neck supple.      Right lower leg: Edema present.      Left lower leg: Edema present.      Comments: No acute synovitis in any of the small or large joints   Neurological: He is alert and oriented to person, place, and time.   Skin: Skin is warm and dry.   BLE with stasis dermatitis changes   Psychiatric: His behavior is normal. Mood normal.   Vitals reviewed.       Significant Labs:  All pertinent lab results from the last 24 hours  have been reviewed.    Significant Imaging:  Imaging results within the past 24 hours have been reviewed.

## 2024-07-17 NOTE — ASSESSMENT & PLAN NOTE
- Pt with long standing severe end stage Group 2-3 pulmonary hypertension, since around 2008  - Has followed with pulm Dr. Ogden for many years  - Rheumatology consulted for concern of possible RA and associated pulm HTN  - Based on initial history and physical exam - pt without clinically evident RA joint disease. Bilateral hand x-ray unremarkable, no inflammation or erosions  - Typically, for RA to indirectly cause pulm HTN, would be in setting of ILD or RA-vasculitis (RA doesn't alone/directly cause PAH)  - Scleroderma could possibly cause PAH, but pt with no sign of SSc based on history and physical exam  - SSA and SSB negative w/ no sicca symptoms to support Sjogren's as possible cause of ILD  - RF noted to be positive on this admission, CCP negative, COURTNEY 1:640, sed rate >120, CRP 21. COURTNEY profile negative. Complements wnl. Hepatitis B and C negative. PR3 negative.  - RF as a general antibody to IgG- could be positive in setting of prior infections/disease or a paraproteinemia      Plan/Recommendations:  - At this point, pt is with end stage disease  - There is no clinically evident autoimmune rheumatologic process, but will complete lab workup to further evaluate. Pending: Myomarker panel, Th/To, scleroderma abs  - No treatment recommendations at this time  - APLs labs: Beta-2 IgG elevated - 25. This will need to be repeated in 12 weeks  - Follow up with our clinic outpatient

## 2024-07-17 NOTE — SUBJECTIVE & OBJECTIVE
Interval History: Events from overnight reviewed. Patient got a dose of seroquel overnight and slept very well. Seen this morning on BiPAP. Reports breathing well. On report, has noticed some hand stiffness on his right hand off an on every few months. Will discuss further when patient is transitioned to HFNC.      Objective:     Vital Signs (Most Recent):  Temp: 96.9 °F (36.1 °C) (07/17/24 0730)  Pulse: 93 (07/17/24 0834)  Resp: (!) 26 (07/17/24 0834)  BP: 121/76 (07/17/24 0700)  SpO2: (!) 94 % (07/17/24 0834) Vital Signs (24h Range):  Temp:  [96.9 °F (36.1 °C)-98 °F (36.7 °C)] 96.9 °F (36.1 °C)  Pulse:  [] 93  Resp:  [12-30] 26  SpO2:  [61 %-95 %] 94 %  BP: ()/(51-77) 121/76     Weight: 103 kg (227 lb 1.2 oz)  Body mass index is 37.79 kg/m².      Intake/Output Summary (Last 24 hours) at 7/17/2024 0843  Last data filed at 7/17/2024 0800  Gross per 24 hour   Intake 1783.12 ml   Output 2975 ml   Net -1191.88 ml        Physical Exam  Vitals and nursing note reviewed.   Constitutional:       General: He is not in acute distress.     Appearance: He is not toxic-appearing.      Interventions: Nasal cannula in place.   HENT:      Head: Normocephalic and atraumatic.      Nose: Congestion present.      Mouth/Throat:      Mouth: Mucous membranes are moist.   Eyes:      General: No scleral icterus.     Pupils: Pupils are equal, round, and reactive to light.   Neck:      Comments: Central line in place, dressing clean and intact  Cardiovascular:      Rate and Rhythm: Tachycardia present. Rhythm irregular.      Heart sounds: Normal heart sounds.   Pulmonary:      Effort: No respiratory distress.      Breath sounds: Rales present. No wheezing.      Comments: Bibasilar crackles.  Abdominal:      Palpations: Abdomen is soft.      Tenderness: There is no abdominal tenderness. There is no guarding.   Musculoskeletal:      Right lower leg: Edema present.      Left lower leg: Edema present.   Skin:     General: Skin is  warm and dry.   Neurological:      Mental Status: He is alert and oriented to person, place, and time.   Psychiatric:         Mood and Affect: Mood normal.         Behavior: Behavior normal. Behavior is cooperative.           Review of Systems    Vents:  Oxygen Concentration (%): 60 (07/17/24 0340)    Lines/Drains/Airways       Central Venous Catheter Line  Duration             Percutaneous Central Line - Triple Lumen  07/08/24 1145 Internal Jugular Right 8 days              Drain  Duration             Male External Urinary Catheter 07/17/24 0030 Small <1 day              Peripheral Intravenous Line  Duration                  Peripheral IV - Single Lumen 07/16/24 1900 18 G Anterior;Right Forearm <1 day                    Significant Labs:    CBC/Anemia Profile:  Recent Labs   Lab 07/16/24  0324 07/17/24  0305   WBC 6.40 5.46   HGB 13.6* 12.6*   HCT 49.2 45.4    144*   MCV 88 87   RDW 22.2* 21.7*        Chemistries:  Recent Labs   Lab 07/15/24  1218 07/16/24  0324 07/17/24  0305    140 140   K 3.6 3.5 3.3*   CL 93* 91* 93*   CO2 38* 38* 37*   BUN 57* 56* 51*   CREATININE 1.9* 1.9* 1.9*   CALCIUM 9.8 10.0 9.7   ALBUMIN  --  2.8* 2.6*   PROT  --  8.3 7.5   BILITOT  --  1.1* 0.9   ALKPHOS  --  152* 143*   ALT  --  10 9*   AST  --  19 18   MG  --  1.8 2.1   PHOS  --  4.3 4.5       All pertinent labs within the past 24 hours have been reviewed.    Significant Imaging:  I have reviewed all pertinent imaging results/findings within the past 24 hours.

## 2024-07-17 NOTE — PLAN OF CARE
CICU DAILY GOALS       A: Awake    RASS: Goal - RASS Goal: 0-->alert and calm  Actual - RASS (Carter Agitation-Sedation Scale): alert and calm   Restraint necessity:    B: Breath   SBT: Not intubated   C: Coordinate A & B, analgesics/sedatives   Pain: managed    SAT: Not intubated  D: Delirium   CAM-ICU: Overall CAM-ICU: Negative  E: Early(intubated/ Progressive (non-intubated) Mobility   MOVE Screen: Pass   Activity: Activity Management: Sitting at edge of bed - L2  FAS: Feeding/Nutrition   Diet order: Diet/Nutrition Received: 2 gram sodium,   Fluid restriction: Fluid Requirement: 1500 fluid restriction   Nutritional Supplement Intake: Quantity 0, Type:  None  T: Thrombus   DVT prophylaxis: VTE Required Core Measure: Pharmacological prophylaxis initiated/maintained  H: HOB Elevation   Head of Bed (HOB) Positioning: HOB at 30-45 degrees  U: Ulcer Prophylaxis   GI: yes  G: Glucose control   managed      S: Skin   Nurses Note -- 4 Eyes  Skin assessed during: Daily Assessment   CHOOSE ONE:  [] No Altered Skin Integrity Present      []Prevention Measures Documented    [x] Yes- Altered Skin Integrity Present or Discovered     [x] LDA Added if Not in Epic (Describe Wound)     [] New Altered Skin Integrity was Present on Admit and Documented in LDA     [] Wound Image Taken  Wound Care Consulted? No  Attending Nurse:  Nayla Natarajan RN/Staff Member:  ADRIEL Winslow    B: Bowel Function   no issues   I: Indwelling Catheters   Dunne necessity:     CVC necessity: Yes   IPAD offered: Not appropriate  D: De-escalation Antibx   No  Plan for the day   Continue to diurese.  Family/Goals of care/Code Status   Code Status: Full Code     No acute events throughout day, VS and assessment per flow sheet, patient progressing towards goals as tolerated, plan of care reviewed with Yong Mcintyre and family, all concerns addressed, will continue to monitor.   Problem: Adult Inpatient Plan of Care  Goal: Plan of Care Review  Outcome:  Progressing

## 2024-07-17 NOTE — CARE UPDATE
Hemodynamics     8PM      CVP 21  SVO2 63  CO 7.1  CI 3.5      Continuous Infusions:   amiodarone in dextrose 5%  0.5 mg/min Intravenous Continuous 16.7 mL/hr at 07/17/24 0301 0.5 mg/min at 07/17/24 0301    DOBUTamine IV infusion (non-titrating)  5 mcg/kg/min Intravenous Continuous 7.8 mL/hr at 07/17/24 0301 5 mcg/kg/min at 07/17/24 0301    furosemide (Lasix) 500 mg in 50 mL infusion (conc: 10 mg/mL)  40 mg/hr Intravenous Continuous 4 mL/hr at 07/17/24 0301 40 mg/hr at 07/17/24 0301       Intake/Output Summary (Last 24 hours) at 7/17/2024 0430  Last data filed at 7/17/2024 0301  Gross per 24 hour   Intake 1928.88 ml   Output 1675 ml   Net 253.88 ml         Recommendation:   -- Added Diuril and Diamox 2/2 reduced UOP   -- Pt needs accurate I&O's with either external or internal armstrong catheter.   -- continue monitoring urine output and hemodynamics closely.   -- continue current care    Case discussed with on call attending.    Chanel Ingram MD  Cardiology Fellow, PGY4

## 2024-07-17 NOTE — ASSESSMENT & PLAN NOTE
Patient with Hypercapnic and Hypoxic Respiratory failure which is Acute on chronic.  he is on home oxygen at 3 LPM. Supplemental oxygen was provided and noted- Oxygen Concentration (%):  [50-60] 60    Signs/symptoms of respiratory failure include- tachypnea and increased work of breathing. Contributing diagnoses includes - CHF Labs and images were reviewed. Patient Has recent ABG, which has been reviewed.    -- Trend VBG BID  -- BiPap during night and naps; okay for HFNC throughout the day per pulm  -- Goal SpO2 > 88%

## 2024-07-17 NOTE — NURSING
CICU DAILY GOALS       A: Awake    RASS: Goal - RASS Goal: 0-->alert and calm  Actual - RASS (Carter Agitation-Sedation Scale): alert and calm   Restraint necessity:    B: Breath   SBT: NA   C: Coordinate A & B, analgesics/sedatives   Pain: managed    SAT: Not intubated  D: Delirium   CAM-ICU: Overall CAM-ICU: Negative  E: Early(intubated/ Progressive (non-intubated) Mobility   MOVE Screen: Pass   Activity: Activity Management: Ambulated in room - L4  FAS: Feeding/Nutrition   Diet order: Diet/Nutrition Received: 2 gram sodium,   Fluid restriction: Fluid Requirement: 1500 fluid restriction   Nutritional Supplement Intake: Quantity N/A, Type:  N/A  T: Thrombus   DVT prophylaxis: VTE Required Core Measure: Pharmacological prophylaxis initiated/maintained  H: HOB Elevation   Head of Bed (HOB) Positioning: HOB at 30-45 degrees  U: Ulcer Prophylaxis   GI: yes  G: Glucose control   managed      S: Skin   Nurses Note -- 4 Eyes  Skin assessed during: Q Shift Change   CHOOSE ONE:  [] No Altered Skin Integrity Present      []Prevention Measures Documented    [x] Yes- Altered Skin Integrity Present or Discovered     [] LDA Added if Not in Epic (Describe Wound)     [x] New Altered Skin Integrity was Present on Admit and Documented in LDA     [] Wound Image Taken  Wound Care Consulted? Yes  Attending Nurse:  Jim Natarajan RN/Staff Member:  ADRIEL Winslow    B: Bowel Function   no issues   I: Indwelling Catheters   Dunne necessity:     CVC necessity: Yes   IPAD offered: Not appropriate  D: De-escalation Antibx   N/A  Plan for the day   Wean O2 and decrease cvp  Family/Goals of care/Code Status   Code Status: Full Code     No acute events throughout day, VS and assessment per flow sheet, patient progressing towards goals as tolerated, plan of care reviewed with Yong Mcintyre and family, all concerns addressed, will continue to monitor.

## 2024-07-17 NOTE — ASSESSMENT & PLAN NOTE
- New onset  - On warfarin for Afib  - Patient on warfarin but has been subtherapeutic, will need to assess risk of cardioversion vs benefit to avoid HF in a patient with HF and tachycardia    - Continue AC as per Afib  - Started Amiodarone for rhythm control  - Monitor electrolytes  -continued in Aflutter consulted EP, s/p DCCV/DAWIT on 7/16

## 2024-07-17 NOTE — PROGRESS NOTES
[facility] - Card[facility] Int[facility]  Pulmonology  Progress Note    Patient Name: Yong Mcintyre  MRN: [id number]  Admission Date: 7/4/2024  Hospital Length of Stay: 13 days  Code Status: Full Code  Attending Provider: Rossy Leary MD  Primary Care Provider: Gianni Escalona MD   Principal Problem: Atypical atrial flutter    Subjective:     Interval History: Events from overnight reviewed. Patient got a dose of seroquel overnight and slept very well. Seen this morning on BiPAP. Reports breathing well. On report, has noticed some hand stiffness on his right hand off an on every few months. Will discuss further when patient is transitioned to [facility].      Objective:     Vital Signs (Most Recent):  Temp: 96.9 °F (36.1 °C) (07/17/24 0730)  Pulse: 93 (07/17/24 0834)  Resp: (!) 26 (07/17/24 0834)  BP: 121/76 (07/17/24 0700)  SpO2: (!) 94 % (07/17/24 0834) Vital Signs (24h Range):  Temp:  [96.9 °F (36.1 °C)-98 °F (36.7 °C)] 96.9 °F (36.1 °C)  Pulse:  [] 93  Resp:  [12-30] 26  SpO2:  [61 %-95 %] 94 %  BP: ()/(51-77) 121/76     Weight: 103 kg (227 lb 1.2 oz)  Body mass index is 37.79 kg/m².      Intake/Output Summary (Last 24 hours) at 7/17/2024 0843  Last data filed at 7/17/2024 0800  Gross per 24 hour   Intake 1783.12 ml   Output 2975 ml   Net -1191.88 ml        Physical Exam  Vitals and nursing note reviewed.   Constitutional:       General: He is not in acute distress.     Appearance: He is not toxic-appearing.      Interventions: Nasal cannula in place.   HENT:      Head: Normocephalic and atraumatic.      Nose: Congestion present.      Mouth/Throat:      Mouth: Mucous membranes are moist.   Eyes:      General: No scleral icterus.     Pupils: Pupils are equal, round, and reactive to light.   Neck:      Comments: Central line in place, dressing clean and intact  Cardiovascular:      Rate and Rhythm: Tachycardia present. Rhythm irregular.      Heart sounds: Normal heart sounds.   Pulmonary:      Effort:  No respiratory distress.      Breath sounds: Rales present. No wheezing.      Comments: Bibasilar crackles.  Abdominal:      Palpations: Abdomen is soft.      Tenderness: There is no abdominal tenderness. There is no guarding.   Musculoskeletal:      Right lower leg: Edema present.      Left lower leg: Edema present.   Skin:     General: Skin is warm and dry.   Neurological:      Mental Status: He is alert and oriented to person, place, and time.   Psychiatric:         Mood and Affect: Mood normal.         Behavior: Behavior normal. Behavior is cooperative.           Review of Systems    Vents:  Oxygen Concentration (%): 60 (07/17/24 0340)    Lines/Drains/Airways       Central Venous Catheter Line  Duration             Percutaneous Central Line - Triple Lumen  07/08/24 1145 Internal Jugular Right 8 days              Drain  Duration             Male External Urinary Catheter 07/17/24 0030 Small <1 day              Peripheral Intravenous Line  Duration                  Peripheral IV - Single Lumen 07/16/24 1900 18 G Anterior;Right Forearm <1 day                    Significant Labs:    CBC/Anemia Profile:  Recent Labs   Lab 07/16/24  0324 07/17/24  0305   WBC 6.40 5.46   HGB 13.6* 12.6*   HCT 49.2 45.4    144*   MCV 88 87   RDW 22.2* 21.7*        Chemistries:  Recent Labs   Lab 07/15/24  1218 07/16/24  0324 07/17/24  0305    140 140   K 3.6 3.5 3.3*   CL 93* 91* 93*   CO2 38* 38* 37*   BUN 57* 56* 51*   CREATININE 1.9* 1.9* 1.9*   CALCIUM 9.8 10.0 9.7   ALBUMIN  --  2.8* 2.6*   PROT  --  8.3 7.5   BILITOT  --  1.1* 0.9   ALKPHOS  --  152* 143*   ALT  --  10 9*   AST  --  19 18   MG  --  1.8 2.1   PHOS  --  4.3 4.5       All pertinent labs within the past 24 hours have been reviewed.    Significant Imaging:  I have reviewed all pertinent imaging results/findings within the past 24 hours.  Assessment/Plan:     Pulmonary  Acute on chronic respiratory failure with hypoxia  Patient with a history of chronic CO2  retention, has labs with elevated bicarb for years (baseline appears upper 30's) on home NIV. Patient is adherent with home O2 and BiPAP. Per notes from [facility], there was some concern for inadequate ventilatory support at home due to worsening bicarb and Hgb, however patient has had multiple hospitalizations in the past calendar year alone for right heart failure. Most recent Echo confirming very high pulmonary artery systolic pressure, and some RV dysfunction but not severely dilating to the point of impeding left heart function. Patient's hypercapnia is multifactorial. Baseline ventilatory defect is present (severe obstruction on prior PFTs in the [facility] system) as well as sheer ventilatory restriction from significantly enlarged heart. Patient's baseline CO2 is hard to determine, but has been in the 70's with normal PH's on arterial blood gases. Patient has been appropriately aggressively diuresed during this hospitalization and may have developed a contraction alkalosis. It's likely some aspect of this increase in CO2 over the past few days is a compensatory response to his alkalosis. Has started diamox for assisted diuresis. Overall, patient has multiple severely morbid and high mortality conditions.     - ABG on 7/17 with normal pH of 7.37 and pCO2 of 70. Patient's pCO2 is at its baseline, patient with normal pH and pCO2 in the upper 70's and lower 80's on venous blood gas. This is a sign of well compensated chronic hypercapnic respiratory failure. Patient does not need continuous BiPAP, will only need it while he is sleeping. My bigger concern is patient's oxygenation, rather than his ventilation. He is requiring a high amount of support to maintain appropriate saturations. The patient's goal O2 saturation should be 88-92% and we should utilize whatever oxygen delivery system is needed to achieve those goals, including Comfort flow / high flow nasal cannula. Hypoxemia will worsen patient's pulmonary  hypertension.    - CTD workup ordered. RF has returned very high. He does appear to have parenchymal abnormalities on CT persistently with what appear to be cysts. 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 There is significant mosaicism on his CT previously which could be consistent with air trapping (prior PFTs with significantly elevated RV-TLC ratio consistent with air trapping), however PVOD / PH can have similar changes.    Recommendations  - Would continue diuresis and agree with diamox  - BiPAP at night and with naps, otherwise OK for high-flow nasal cannula throughout the day. Would agree with keeping liters set to 60 and decrease FiO2 as able  - Goal sat is 88-92%  - Continue bronchodilators   - Recommend rheumatology consultation for evaluation of RA. EULAR score would be definite for RA with 2 joint involvement, patient is unclear about intermittent hand stiffness for the past few years. Reports his grandfather had arthritis, unsure about RA. It sounds more consistent with osteoarthritis but he reported some hand deformity. Overall unclear and potential RA / CTD diagnosis could give targetable disease process to treat for hypoxemia and PH.                 Ibrahima Galvin MD  Pulmonology  [facility] - [facility]

## 2024-07-17 NOTE — HPI
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    Pertinent Recent/Prior History:  - Had RHC in 1/2024 with notably elevated PA pressures, severe pulm HTN with normal to mildly elevated left sided filling pressure, moderately elevated right sided filling pressure, mod to severe TR  - Follows outpt with pulm Dr. Child at [facility] for many years - last visit 6/12/24  - Follows outpt with heart transplant Dr. Martínez - last visit 6//24  - Follows outpt with nephro Dr. Sotelo - last visit 6/19/24  - Follows outpt with PCP Dr. Escalona for many years   - Has had chronic pulm HTN and cardiopulmonary complications since at least 2008 - Legacy Documents reviewed as well  - Prior MRI in 9/2012 did show some abnormal edema around the carpal bones with small amount of fluid within radiocarpal and intercarpal joints - very non specific findings but could possible be seen with inflammatory arthritis too    Current Hospitalization:  - Initially presented with worsening dyspnea and BLE edema x 2 weeks  - Has had multiple similar admissions over past few months for decompensated HF  - Pulmonology initiated CTD workup during this hospitalization, which has resulted in high positive RF and a +COURTNEY so far (other labs pending)  - They feel he has some parenchymal abnormalities on CT (could be cysts) and some mosaicism (which could be air trapping or related to known PH) - these are not typical of RA-ILD but there is still concern for CTD related ILD per pulm     Rheumatology is consulted for "concern for pulmonary HTN 2/2 to RA."    On evaluation, pt endorses the above long standing history of pulmonary disease. He has never previously been dx with an inflammatory " arthritis as far as he can recall. Pt denies any joint pains, stiffness, or swelling symptoms. No h/o skin rashes (apart from venous stasis dermatitis changes), Raynaud's, patchy alopecia, uveitis/painful red problems, oral/nasal ulcers, GI changes, urinary changes. Has had respiratory and exertional symptoms along with lower extremity edema in setting of known cardiopulmonary disease.    Family hx: possible OA in grandfather, no other known autoimmune rheumatologic disease hx  Social hx: non smoker, rare alcohol, no other drug use

## 2024-07-17 NOTE — ASSESSMENT & PLAN NOTE
WHO group 2-3 per his managing pulmonologist (Danyell at [facility])  Adherent with diet, CPAP, and on continuous O2 3L at home.     -- RV dysfunction treatment as above  -- Pulm consulted, recs appreciated   -- Concern for RA per pulm, rheum consulted

## 2024-07-17 NOTE — ASSESSMENT & PLAN NOTE
Acute on chronic. - NYHA class III symptoms with recurrent admissions.  - He was seen by [facility] outpatient 6/2024 who state patient was feeling better on new diuretic regimen however worried about the frequency of use of metolazone and his worsening kidney function.    - Admitted with CXR with cardiomegaly and pulmonary vascular congestion, suggestive of pulmonary edema secondary to CHF. BNP elevated at 800. Creatinine worse at 3.1 on admission.  - Given his recurrent HF admissions and most recent hemodynamics, [facility] believes his overall prognosis is poor and recommended palliative care consult. Palliative care evaluated patient. Patient wishes to continue full measures at this time.     - 2 gram sodium restriction and 1500cc fluid restriction.  - Encourage physical activity with graded exercise program.  - Continue central line for CVP monitoring  - Continue Acetazolamide PO  - lasix gtt   - Continue dobutamine gtt  - amio PO  - Diuril as needed  - Monitor lytes BID; replete as indicated

## 2024-07-17 NOTE — CONSULTS
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    Pertinent Recent/Prior History:  - Had RHC in 1/2024 with notably elevated PA pressures, severe pulm HTN with normal to mildly elevated left sided filling pressure, moderately elevated right sided filling pressure, mod to severe TR  - Follows outpt with pulm Dr. Child at [facility] for many years - last visit 6/12/24  - Follows outpt with heart transplant Dr. Martínez - last visit 6//24  - Follows outpt with nephro Dr. Sotelo - last visit 6/19/24  - Follows outpt with PCP Dr. Escalona for many years   - Has had chronic pulm HTN and cardiopulmonary complications since at least 2008 - [facility] Documents reviewed as well  - Prior MRI in 9/2012 did show some abnormal edema around the carpal bones with small amount of fluid within radiocarpal and intercarpal joints - very non specific findings but could possible be seen with inflammatory arthritis too    Current Hospitalization:  - Initially presented with worsening dyspnea and BLE edema x 2 weeks  - Has had multiple similar admissions over past few months for decompensated HF  - Pulmonology initiated CTD workup during this hospitalization, which has resulted in high positive RF and a +COURTNEY  "so far (other labs pending)  - They feel he has some parenchymal abnormalities on CT (could be cysts) and some mosaicism (which could be air trapping or related to known PH) - these are not typical of RA-ILD but there is still concern for CTD related ILD per pulm     Rheumatology is consulted for "concern for pulmonary HTN 2/2 to RA."    On initial evaluation, pt endorses the above long standing history of pulmonary disease. He has never previously been dx with an inflammatory arthritis as far as he can recall. Pt denies any joint pains, stiffness, or swelling symptoms. No h/o skin rashes (apart from venous stasis dermatitis changes), Raynaud's, patchy alopecia, uveitis/painful red problems, oral/nasal ulcers, GI changes, urinary changes. He is sleepy and does repeatedly fall asleep during our discussion however. Has had respiratory and exertional symptoms along with lower extremity edema in setting of known cardiopulmonary disease.    Family hx: possible OA in grandfather, no other known autoimmune rheumatologic disease hx  Social hx: non smoker, rare alcohol, no other drug use    Past Medical History:   Diagnosis Date    Arthritis     CHF (congestive heart failure)     Diastolic dysfunction    Cor pulmonale 11/05/2012    Gallstones     GERD (gastroesophageal reflux disease)     Hypertension     Morbid obesity 11/05/2012    Obesity hypoventilation syndrome     On home oxygen therapy 03/07/2014    MALLIKA (obstructive sleep apnea)     BPAP 16/11 with 3 L at night (continuous O2).    Paroxysmal atrial fibrillation     PFO (patent foramen ovale) 11/05/2012    status post closure    Pickwickian syndrome 11/05/2012    Pulmonary hypertension        Past Surgical History:   Procedure Laterality Date    CHOLECYSTECTOMY      RIGHT HEART CATHETERIZATION Right 03/22/2022    Procedure: INSERTION, CATHETER, RIGHT HEART;  Surgeon: Tony Martínez MD;  Location: [facility] CATH LAB;  Service: Cardiology;  Laterality: Right;    RIGHT HEART " "CATHETERIZATION Right 01/31/2024    Procedure: INSERTION, CATHETER, RIGHT HEART;  Surgeon: Sheri Ritter MD;  Location: [facility] CATH LAB;  Service: Cardiology;  Laterality: Right;    UPPER GASTROINTESTINAL ENDOSCOPY      2-3 years ago       Immunization History   Administered Date(s) Administered    COVID-19, MRNA, LN-S, PF (MODERNA FULL 0.5 ML DOSE) 09/30/2022    COVID-19, MRNA, LN-S, PF (Pfizer) (Purple Cap) 03/09/2021, 03/30/2021, 10/18/2021    COVID-19, mRNA, LNP-S, bivalent booster, PF (PFIZER OMICRON) 09/30/2022    Influenza 11/07/2018, 04/09/2019, 09/18/2020    Influenza (Flumist) - Quadrivalent - Intranasal *Preferred* (2-49 years old) 10/16/2014    Influenza - Quadrivalent 10/16/2014    Influenza - Quadrivalent - MDCK - PF 10/31/2018    Influenza - Quadrivalent - PF *Preferred* (6 months and older) 10/14/2015, 10/27/2016, 10/23/2017, 10/24/2019, 09/16/2020, 11/16/2021, 09/30/2022, 12/15/2023    Influenza - Trivalent (ADULT) 11/06/2014    Influenza - Trivalent - PF (ADULT) 11/06/2014, 10/14/2015, 10/27/2016, 10/23/2017, 10/24/2019, 09/16/2020, 11/16/2021    Pneumococcal Polysaccharide - 23 Valent 10/23/2017    Varicella 11/06/2014       Review of patient's allergies indicates:   Allergen Reactions    Lipitor [atorvastatin] Other (See Comments)     "it makes my legs feel like jelly"  Other reaction(s): causes legs to hurt     Current Facility-Administered Medications   Medication Frequency    0.9%  NaCl infusion PRN    0.9%  NaCl infusion PRN    acetaminophen tablet 650 mg Q6H PRN    acetaZOLAMIDE tablet 500 mg BID    albuterol inhaler 2 puff Q4H PRN    albuterol inhaler 2 puff Q6H    allopurinoL tablet 300 mg Daily    aluminum & magnesium hydroxide-simethicone 400-400-40 mg/5 mL suspension 30 mL Q6H PRN    amiodarone tablet 400 mg BID    Followed by    [START ON 7/27/2024] amiodarone tablet 200 mg Daily    DOBUtamine 1000 mg in D5W 250 mL infusion Continuous    fluticasone-salmeterol 250-50 mcg/dose diskus " inhaler 1 puff BID    furosemide (Lasix) 500 mg in 50 mL infusion (conc: 10 mg/mL) Continuous    glycerin adult suppository 1 suppository Daily PRN    melatonin tablet 6 mg Nightly PRN    mupirocin 2 % ointment BID    pantoprazole EC tablet 40 mg Daily    quetiapine split tablet 50 mg QHS    senna-docusate 8.6-50 mg per tablet 1 tablet Daily    sodium chloride 0.9% flush 10 mL PRN    warfarin (COUMADIN) tablet 5 mg Daily     Family History       Problem Relation (Age of Onset)    Arthritis Mother, Maternal Grandmother, Maternal Grandfather    Asthma Mother, Sister, Maternal Grandmother    Breast cancer Paternal Grandmother    Diabetes Maternal Grandmother    Down syndrome Brother    Gout Brother    Hypertension Father, Maternal Grandmother, Maternal Grandfather, Paternal Grandfather          Tobacco Use    Smoking status: Never    Smokeless tobacco: Never   Substance and Sexual Activity    Alcohol use: Yes     Comment: holidays  rare    Drug use: No    Sexual activity: Not Currently     Partners: Female     Objective:     Vital Signs (Most Recent):  Temp: 97.4 °F (36.3 °C) (07/17/24 1100)  Pulse: 102 (07/17/24 1355)  Resp: 20 (07/17/24 1355)  BP: (!) 101/59 (07/17/24 1100)  SpO2: (!) 93 % (07/17/24 1355) Vital Signs (24h Range):  Temp:  [96.9 °F (36.1 °C)-98 °F (36.7 °C)] 97.4 °F (36.3 °C)  Pulse:  [] 102  Resp:  [12-33] 20  SpO2:  [61 %-96 %] 93 %  BP: ()/(58-77) 101/59     Weight: 103 kg (227 lb 1.2 oz) (07/17/24 0318)  Body mass index is 37.79 kg/m².  Body surface area is 2.17 meters squared.      Intake/Output Summary (Last 24 hours) at 7/17/2024 1455  Last data filed at 7/17/2024 1200  Gross per 24 hour   Intake 1794.02 ml   Output 2775 ml   Net -980.98 ml         Physical Exam   Constitutional: He is oriented to person, place, and time. He appears well-developed, well-nourished and obese. No distress.   Pt alert and arousable but very sleepy - frequently dozes off during evaluation   HENT:   Head:  Normocephalic and atraumatic.   Right Ear: External ear normal.   Left Ear: External ear normal.   Nose: Nose normal. No nasal congestion.   Mouth/Throat: Mucous membranes are moist. Oropharynx is clear.   Eyes: Conjunctivae are normal.   Cardiovascular: Normal rate and regular rhythm.   Pulmonary/Chest: No respiratory distress. He has no wheezes.   Pulmonary Comments: BiPAP device on earlier, then high leo O2 on f/u eval. Diminished breath sounds bilaterally, some basilar crackles present.  Abdominal: Soft. He exhibits no distension. There is no abdominal tenderness.   Musculoskeletal:         General: No swelling or tenderness. Normal range of motion.      Cervical back: Normal range of motion and neck supple.      Comments: No acute synovitis in any of the small or large joints.   Neurological: He is oriented to person, place, and time.   Skin: Skin is warm and dry.   BLE with stasis dermatitis changes   Psychiatric: His behavior is normal. Mood normal.   Vitals reviewed.       Significant Labs:  All pertinent lab results from the last 24 hours have been reviewed.    Significant Imaging:  Imaging results within the past 24 hours have been reviewed.  Assessment/Plan:     Cardiac/Vascular  Pulmonary hypertension  - Pt with long standing severe end stage Group 2-3 pulmonary hypertension, since around 2008  - Has followed with pulm Dr. Ogdne for many years  - Rheumatology consulted for concern of possible RA and associated pulm HTN  - Based on initial history and physical exam - pt without clinically evident RA joint disease  - Typically, for RA to indirectly cause pulm HTN, would be in setting of ILD or RA-vasculitis (RA doesn't alone/directly cause PAH)  - Scleroderma could possibly cause PAH, but pt with no sign of SSc based on history and physical exam  - Bilateral hand x-rays without notable erosions/inflammatory changes, final read pending  - RF noted to be positive on this admission, CCP negative, COURTNEY  1:640, sed rate >120, CRP 21  - RF as a general antibody to IgG- could be positive in setting of prior infections/disease or a paraproteinemia    Plan/Recommendations:  - At this point, pt is with end stage disease  - There is no clinically evident autoimmune rheumatologic process, but will complete lab workup to further evaluate  - No treatment recommendations at this time  - Pending: COURTNEY profile, myomarker panel, Th/To, scleroderma abs, hepatitis B/C serologies, APLS serologies (except for lupus anticoagulant due to being on warfarin)      Thank you for your consult. I will follow-up with patient. Please contact us if you have any additional questions.    Assessment and plan discussed with supervising attending, Dr. Jarrell. Please do not hesitate to reach out with any questions or concerns.      Ranjana Falcon MD  PGY-5, Rheumatology    I have personally taken the history and examined the patient and concur with the resident's note as above.  He has a long history of pulmonary hypertension.  He had had no previous rheumatologic evaluation.  During this hospitalization he was found to have a positive COURTNEY with COURTNEY profile pending.  Has a positive rheumatoid factor but negative CCP antibody.  He has increased inflammatory markers.  He has a history of occasional locking in the fingers but has had no history of joint swelling.  He has had no muscle weakness.  He has had no rash.  On physical exam he has no synovitis and has normal muscle strength.  There are multiple causes of a positive rheumatoid factor, especially with a negative CCP antibody, and COURTNEY.  We have ordered further laboratory studies.  We can not connect his pulmonary hypertension with a connective tissue disease at this time.  We have no therapeutic recommendations.  We will continue to follow him with you.

## 2024-07-17 NOTE — SUBJECTIVE & OBJECTIVE
Interval History: NAEON. Continued on HFNC     ROS  Objective:     Vital Signs (Most Recent):  Temp: 97.4 °F (36.3 °C) (07/17/24 1100)  Pulse: 97 (07/17/24 1557)  Resp: 16 (07/17/24 1557)  BP: (!) 101/59 (07/17/24 1100)  SpO2: (!) 92 % (07/17/24 1557) Vital Signs (24h Range):  Temp:  [96.9 °F (36.1 °C)-98 °F (36.7 °C)] 97.4 °F (36.3 °C)  Pulse:  [] 97  Resp:  [12-33] 16  SpO2:  [61 %-96 %] 92 %  BP: ()/(58-77) 101/59     Weight: 103 kg (227 lb 1.2 oz)  Body mass index is 37.79 kg/m².     SpO2: (!) 92 %         Intake/Output Summary (Last 24 hours) at 7/17/2024 1610  Last data filed at 7/17/2024 1500  Gross per 24 hour   Intake 2229.65 ml   Output 3450 ml   Net -1220.35 ml       Lines/Drains/Airways       Central Venous Catheter Line  Duration             Percutaneous Central Line - Triple Lumen  07/08/24 1145 Internal Jugular Right 9 days              Drain  Duration             Male External Urinary Catheter 07/17/24 0030 Small <1 day              Peripheral Intravenous Line  Duration                  Peripheral IV - Single Lumen 07/16/24 1900 18 G Anterior;Right Forearm <1 day                       Physical Exam  Vitals reviewed.   Constitutional:       General: He is not in acute distress.     Appearance: He is obese. He is ill-appearing.   Cardiovascular:      Rate and Rhythm: Regular rhythm. Tachycardia present.   Pulmonary:      Effort: No respiratory distress.      Breath sounds: No rhonchi.   Abdominal:      General: Abdomen is flat.   Musculoskeletal:      Right lower leg: No edema.      Left lower leg: No edema.   Skin:     General: Skin is warm.   Neurological:      Mental Status: He is alert.            Significant Labs: All pertinent lab results from the last 24 hours have been reviewed.    Significant Imaging: Echocardiogram: 2D echo with color flow doppler: No results found. However, due to the size of the patient record, not all encounters were searched. Please check Results Review for a  complete set of results.

## 2024-07-18 LAB
ALBUMIN SERPL BCP-MCNC: 2.6 G/DL (ref 3.5–5.2)
ALBUMIN SERPL ELPH-MCNC: 3.09 G/DL (ref 3.35–5.55)
ALLENS TEST: ABNORMAL
ALLENS TEST: ABNORMAL
ALP SERPL-CCNC: 141 U/L (ref 55–135)
ALPHA1 GLOB SERPL ELPH-MCNC: 0.49 G/DL (ref 0.17–0.41)
ALPHA2 GLOB SERPL ELPH-MCNC: 0.62 G/DL (ref 0.43–0.99)
ALT SERPL W/O P-5'-P-CCNC: 8 U/L (ref 10–44)
ANION GAP SERPL CALC-SCNC: 8 MMOL/L (ref 8–16)
ANTI SM ANTIBODY: 0.18 RATIO (ref 0–0.99)
ANTI SM/RNP ANTIBODY: 0.16 RATIO (ref 0–0.99)
ANTI SM/RNP ANTIBODY: 0.19 RATIO (ref 0–0.99)
ANTI-SM INTERPRETATION: NEGATIVE
ANTI-SM/RNP INTERPRETATION: NEGATIVE
ANTI-SM/RNP INTERPRETATION: NEGATIVE
ANTI-SSA ANTIBODY: 0.12 RATIO (ref 0–0.99)
ANTI-SSA INTERPRETATION: NEGATIVE
ANTI-SSB ANTIBODY: 0.08 RATIO (ref 0–0.99)
ANTI-SSB INTERPRETATION: NEGATIVE
AST SERPL-CCNC: 16 U/L (ref 10–40)
B-GLOBULIN SERPL ELPH-MCNC: 1.1 G/DL (ref 0.5–1.1)
BASOPHILS # BLD AUTO: 0.04 K/UL (ref 0–0.2)
BASOPHILS NFR BLD: 0.7 % (ref 0–1.9)
BILIRUB SERPL-MCNC: 0.9 MG/DL (ref 0.1–1)
BUN SERPL-MCNC: 49 MG/DL (ref 6–20)
CALCIUM SERPL-MCNC: 9.4 MG/DL (ref 8.7–10.5)
CHLORIDE SERPL-SCNC: 92 MMOL/L (ref 95–110)
CO2 SERPL-SCNC: 38 MMOL/L (ref 23–29)
CREAT SERPL-MCNC: 2 MG/DL (ref 0.5–1.4)
DELSYS: ABNORMAL
DIFFERENTIAL METHOD BLD: ABNORMAL
DSDNA AB SER-ACNC: NORMAL [IU]/ML
ENA SCL70 IGG SER IA-ACNC: 0.3 U
EOSINOPHIL # BLD AUTO: 0 K/UL (ref 0–0.5)
EOSINOPHIL NFR BLD: 0.7 % (ref 0–8)
ERYTHROCYTE [DISTWIDTH] IN BLOOD BY AUTOMATED COUNT: 22.3 % (ref 11.5–14.5)
EST. GFR  (NO RACE VARIABLE): 40.4 ML/MIN/1.73 M^2
FIO2: 60
FLOW: 60
GAMMA GLOB SERPL ELPH-MCNC: 2.5 G/DL (ref 0.67–1.58)
GLUCOSE SERPL-MCNC: 101 MG/DL (ref 70–110)
HCO3 UR-SCNC: 41.7 MMOL/L (ref 24–28)
HCO3 UR-SCNC: 43.1 MMOL/L (ref 24–28)
HCT VFR BLD AUTO: 46.9 % (ref 40–54)
HGB BLD-MCNC: 12.7 G/DL (ref 14–18)
IMM GRANULOCYTES # BLD AUTO: 0.01 K/UL (ref 0–0.04)
IMM GRANULOCYTES NFR BLD AUTO: 0.2 % (ref 0–0.5)
INR PPP: 2.8 (ref 0.8–1.2)
LYMPHOCYTES # BLD AUTO: 1 K/UL (ref 1–4.8)
LYMPHOCYTES NFR BLD: 18.4 % (ref 18–48)
MAGNESIUM SERPL-MCNC: 2 MG/DL (ref 1.6–2.6)
MCH RBC QN AUTO: 23.7 PG (ref 27–31)
MCHC RBC AUTO-ENTMCNC: 27.1 G/DL (ref 32–36)
MCV RBC AUTO: 88 FL (ref 82–98)
MODE: ABNORMAL
MONOCYTES # BLD AUTO: 0.4 K/UL (ref 0.3–1)
MONOCYTES NFR BLD: 7.8 % (ref 4–15)
NEUTROPHILS # BLD AUTO: 3.9 K/UL (ref 1.8–7.7)
NEUTROPHILS NFR BLD: 72.2 % (ref 38–73)
NRBC BLD-RTO: 0 /100 WBC
PATHOLOGIST INTERPRETATION SPE: NORMAL
PCO2 BLDA: 76.2 MMHG (ref 35–45)
PCO2 BLDA: 82 MMHG (ref 35–45)
PH SMN: 7.33 [PH] (ref 7.35–7.45)
PH SMN: 7.35 [PH] (ref 7.35–7.45)
PHOSPHATE SERPL-MCNC: 4.4 MG/DL (ref 2.7–4.5)
PLATELET # BLD AUTO: 147 K/UL (ref 150–450)
PMV BLD AUTO: 10.7 FL (ref 9.2–12.9)
PO2 BLDA: 32 MMHG (ref 40–60)
PO2 BLDA: 41 MMHG (ref 40–60)
POC BE: 16 MMOL/L
POC BE: 17 MMOL/L
POC SATURATED O2: 53 % (ref 95–100)
POC SATURATED O2: 69 % (ref 95–100)
POC TCO2: 44 MMOL/L (ref 24–29)
POC TCO2: 46 MMOL/L (ref 24–29)
POTASSIUM SERPL-SCNC: 3.2 MMOL/L (ref 3.5–5.1)
PROT SERPL-MCNC: 7.6 G/DL (ref 6–8.4)
PROT SERPL-MCNC: 7.8 G/DL (ref 6–8.4)
PROTHROMBIN TIME: 28.7 SEC (ref 9–12.5)
RBC # BLD AUTO: 5.36 M/UL (ref 4.6–6.2)
SAMPLE: ABNORMAL
SAMPLE: ABNORMAL
SITE: ABNORMAL
SITE: ABNORMAL
SODIUM SERPL-SCNC: 138 MMOL/L (ref 136–145)
WBC # BLD AUTO: 5.37 K/UL (ref 3.9–12.7)

## 2024-07-18 PROCEDURE — 99900031 HC PATIENT EDUCATION (STAT)

## 2024-07-18 PROCEDURE — 25000003 PHARM REV CODE 250

## 2024-07-18 PROCEDURE — 80048 BASIC METABOLIC PNL TOTAL CA: CPT | Mod: XB | Performed by: STUDENT IN AN ORGANIZED HEALTH CARE EDUCATION/TRAINING PROGRAM

## 2024-07-18 PROCEDURE — 25000003 PHARM REV CODE 250: Performed by: STUDENT IN AN ORGANIZED HEALTH CARE EDUCATION/TRAINING PROGRAM

## 2024-07-18 PROCEDURE — 83735 ASSAY OF MAGNESIUM: CPT | Mod: 91 | Performed by: STUDENT IN AN ORGANIZED HEALTH CARE EDUCATION/TRAINING PROGRAM

## 2024-07-18 PROCEDURE — 85610 PROTHROMBIN TIME: CPT

## 2024-07-18 PROCEDURE — 94660 CPAP INITIATION&MGMT: CPT

## 2024-07-18 PROCEDURE — 63600175 PHARM REV CODE 636 W HCPCS: Performed by: STUDENT IN AN ORGANIZED HEALTH CARE EDUCATION/TRAINING PROGRAM

## 2024-07-18 PROCEDURE — 63600175 PHARM REV CODE 636 W HCPCS

## 2024-07-18 PROCEDURE — 94799 UNLISTED PULMONARY SVC/PX: CPT

## 2024-07-18 PROCEDURE — 99231 SBSQ HOSP IP/OBS SF/LOW 25: CPT | Mod: GC,,, | Performed by: INTERNAL MEDICINE

## 2024-07-18 PROCEDURE — 27100171 HC OXYGEN HIGH FLOW UP TO 24 HOURS

## 2024-07-18 PROCEDURE — 80053 COMPREHEN METABOLIC PANEL: CPT

## 2024-07-18 PROCEDURE — 83735 ASSAY OF MAGNESIUM: CPT

## 2024-07-18 PROCEDURE — 82803 BLOOD GASES ANY COMBINATION: CPT

## 2024-07-18 PROCEDURE — 84100 ASSAY OF PHOSPHORUS: CPT

## 2024-07-18 PROCEDURE — 20000000 HC ICU ROOM

## 2024-07-18 PROCEDURE — 85025 COMPLETE CBC W/AUTO DIFF WBC: CPT

## 2024-07-18 PROCEDURE — 94640 AIRWAY INHALATION TREATMENT: CPT

## 2024-07-18 PROCEDURE — 94761 N-INVAS EAR/PLS OXIMETRY MLT: CPT

## 2024-07-18 PROCEDURE — 99900035 HC TECH TIME PER 15 MIN (STAT)

## 2024-07-18 RX ORDER — POTASSIUM CHLORIDE 750 MG/1
30 CAPSULE, EXTENDED RELEASE ORAL ONCE
Status: COMPLETED | OUTPATIENT
Start: 2024-07-18 | End: 2024-07-18

## 2024-07-18 RX ORDER — POTASSIUM CHLORIDE 29.8 MG/ML
40 INJECTION INTRAVENOUS ONCE
Status: COMPLETED | OUTPATIENT
Start: 2024-07-18 | End: 2024-07-18

## 2024-07-18 RX ADMIN — ACETAZOLAMIDE 500 MG: 250 TABLET ORAL at 09:07

## 2024-07-18 RX ADMIN — DOBUTAMINE HYDROCHLORIDE 5 MCG/KG/MIN: 400 INJECTION INTRAVENOUS at 02:07

## 2024-07-18 RX ADMIN — FLUTICASONE PROPIONATE AND SALMETEROL 1 PUFF: 50; 250 POWDER RESPIRATORY (INHALATION) at 08:07

## 2024-07-18 RX ADMIN — PANTOPRAZOLE SODIUM 40 MG: 40 TABLET, DELAYED RELEASE ORAL at 09:07

## 2024-07-18 RX ADMIN — ALBUTEROL SULFATE 2 PUFF: 108 INHALANT RESPIRATORY (INHALATION) at 12:07

## 2024-07-18 RX ADMIN — FUROSEMIDE 40 MG/HR: 10 INJECTION, SOLUTION INTRAMUSCULAR; INTRAVENOUS at 02:07

## 2024-07-18 RX ADMIN — ALBUTEROL SULFATE 2 PUFF: 108 INHALANT RESPIRATORY (INHALATION) at 08:07

## 2024-07-18 RX ADMIN — AMIODARONE HYDROCHLORIDE 400 MG: 200 TABLET ORAL at 09:07

## 2024-07-18 RX ADMIN — POTASSIUM CHLORIDE 40 MEQ: 29.8 INJECTION, SOLUTION INTRAVENOUS at 03:07

## 2024-07-18 RX ADMIN — SENNOSIDES AND DOCUSATE SODIUM 1 TABLET: 50; 8.6 TABLET ORAL at 09:07

## 2024-07-18 RX ADMIN — POTASSIUM CHLORIDE 30 MEQ: 750 CAPSULE, EXTENDED RELEASE ORAL at 03:07

## 2024-07-18 RX ADMIN — ALLOPURINOL 300 MG: 100 TABLET ORAL at 09:07

## 2024-07-18 RX ADMIN — MUPIROCIN: 20 OINTMENT TOPICAL at 09:07

## 2024-07-18 RX ADMIN — QUETIAPINE FUMARATE 50 MG: 25 TABLET ORAL at 09:07

## 2024-07-18 NOTE — PROGRESS NOTES
[facility] - Cardiac Intensive [facility]  Cardiology  Progress Note    Patient Name: Yong Mcintyre  MRN: [id number]  Admission Date: 7/4/2024  Hospital Length of Stay: 14 days  Code Status: Full Code   Attending Physician: Rossy Leary MD   Primary Care Physician: Gianni Escalona MD  Expected Discharge Date: 7/22/2024  Principal Problem:Atypical atrial flutter    Subjective:     Hospital Course:   The patient was admitted to the CCU for further management of right-sided heart failure. He was diuresed with a lasix gtt. Electrolytes were monitored and repleted as indicated. Palliative care was consulted given his poor prognosis. The patient had significant O2 requirements on admission which were slow to wean. A central line was placed for close CVP monitoring in the setting of aggressive diuresis. Nutrition consult placed for low salt diet education. Acetazolamide was added to his diuretic regimen due to persistent metabolic alkalosis. The patient required intermittent Bipap due to hypercapnic respiratory failure. Dobutamine was added to augment cardiac output to further diurese patient. The patient was in aflutter, EP was consulted. Plan for cardioversion 7/16/24. Pulm consulted for hypercapnia. Per pulm ok to remain on HFNC. Presentation concerning for end stage RA causing pum HTN, rheum consulted. No therapies available, but rheum has ordered work up. Switched from IV to PO amio. Continued diamox. The patient wishes to leave the hospital but is not ready to consider hospice. Continuing discussions with family.    Interval History: NAEON. Continues to be on BiPAP with sleep and HFNC during day     ROS  Objective:     Vital Signs (Most Recent):  Temp: 98.2 °F (36.8 °C) (07/18/24 1505)  Pulse: 98 (07/18/24 1505)  Resp: 15 (07/18/24 1505)  BP: (!) 101/55 (07/18/24 1505)  SpO2: (!) 92 % (07/18/24 1534) Vital Signs (24h Range):  Temp:  [97.5 °F (36.4 °C)-98.6 °F (37 °C)] 98.2 °F (36.8 °C)  Pulse:  [] 98  Resp:   [11-34] 15  SpO2:  [86 %-98 %] 92 %  BP: ()/(51-74) 101/55     Weight: 100.5 kg (221 lb 9 oz)  Body mass index is 36.87 kg/m².     SpO2: (!) 92 %         Intake/Output Summary (Last 24 hours) at 7/18/2024 1539  Last data filed at 7/18/2024 1505  Gross per 24 hour   Intake 1943.07 ml   Output 2815 ml   Net -871.93 ml       Lines/Drains/Airways       Central Venous Catheter Line  Duration             Percutaneous Central Line - Triple Lumen  07/08/24 1145 Internal Jugular Right 10 days              Peripheral Intravenous Line  Duration                  Peripheral IV - Single Lumen 07/16/24 1900 18 G Anterior;Right Forearm 1 day                       Physical Exam  Vitals reviewed.   Constitutional:       General: He is not in acute distress.     Appearance: He is obese. He is ill-appearing.   Cardiovascular:      Rate and Rhythm: Normal rate and regular rhythm.   Pulmonary:      Effort: No respiratory distress.      Breath sounds: No rhonchi.   Abdominal:      General: Abdomen is flat.   Musculoskeletal:      Right lower leg: No edema.      Left lower leg: No edema.   Skin:     General: Skin is warm.   Neurological:      Mental Status: He is alert.            Significant Labs: All pertinent lab results from the last 24 hours have been reviewed.    Significant Imaging: Echocardiogram: 2D echo with color flow doppler: No results found. However, due to the size of the patient record, not all encounters were searched. Please check Results Review for a complete set of results.  Assessment and Plan:       * Atypical atrial flutter  - New onset  - On warfarin for Afib  - Patient on warfarin but has been subtherapeutic, will need to assess risk of cardioversion vs benefit to avoid HF in a patient with HF and tachycardia    - Continue AC as per Afib  - Started Amiodarone for rhythm control  - Monitor electrolytes  -continued in Aflutter consulted EP, s/p DCCV/DAWIT on 7/16      Metabolic alkalosis  Bicarb 40's in setting  of aggressive diuresis. Suspect contraction alkalosis.    - Continue diamox  - Trend Bicarb    Acute on chronic respiratory failure with hypoxia  Patient with Hypercapnic and Hypoxic Respiratory failure which is Acute on chronic.  he is on home oxygen at 3 LPM. Supplemental oxygen was provided and noted- Oxygen Concentration (%):  [55-60] 60    Signs/symptoms of respiratory failure include- tachypnea and increased work of breathing. Contributing diagnoses includes - CHF Labs and images were reviewed. Patient Has recent ABG, which has been reviewed.    -- Trend VBG BID  -- BiPap during night and naps; okay for HFNC throughout the day per pulm  -- Goal SpO2 > 88%    Acute on chronic right-sided congestive heart failure  - See 'RV dysfunction'    Paroxysmal A-fib  xOn coumadin.    Lab Results   Component Value Date    INR 2.8 (H) 07/18/2024    INR 2.5 (H) 07/17/2024    INR 2.5 (H) 07/16/2024     -- Goal INR 2-3  -- Titrate warfarin daily    -- EP consulted for Aflutter, cardioverted 7/16    Right ventricular dysfunction  Acute on chronic. - NYHA class III symptoms with recurrent admissions.  - He was seen by [facility] outpatient 6/2024 who state patient was feeling better on new diuretic regimen however worried about the frequency of use of metolazone and his worsening kidney function.    - Admitted with CXR with cardiomegaly and pulmonary vascular congestion, suggestive of pulmonary edema secondary to CHF. BNP elevated at 800. Creatinine worse at 3.1 on admission.  - Given his recurrent HF admissions and most recent hemodynamics, [facility] believes his overall prognosis is poor and recommended palliative care consult. Palliative care evaluated patient. Patient wishes to continue full measures at this time.     - 2 gram sodium restriction and 1500cc fluid restriction.  - Encourage physical activity with graded exercise program.  - Continue central line for CVP monitoring  - Continue Acetazolamide PO  - lasix gtt   - Continue  dobutamine gtt  - amio PO  - Diuril as needed  - Monitor lytes BID; replete as indicated    Palliative care encounter  - patient not amenable to palliative options   - pt asked primary team to speak to sister about current medical state and hospice.     MALLIKA (obstructive sleep apnea)  See 'hypoventilation syndrome'    Pulmonary hypertension  WHO group 2-3 per his managing pulmonologist (Danyell at [facility])  Adherent with diet, CPAP, and on continuous O2 3L at home.     -- RV dysfunction treatment as above  -- Pulm consulted, recs appreciated   -- Concern for RA per pulm, rheum consulted    Hypoventilation syndrome  BIPAP QHS; NC during meals. Avoid HFNC.  Significant hypercapnia w/ pCO2 80-90; will start daytime BiPap    - BIPAP for ventilation preferred over high O2          VTE Risk Mitigation (From admission, onward)           Ordered     warfarin (COUMADIN) tablet 5 mg  Daily         07/12/24 0751     IP VTE HIGH RISK PATIENT  Once         07/04/24 2302     Place sequential compression device  Until discontinued         07/04/24 2302                    Sheeba Baum DO  Cardiology  [facility] - Cardiac [facility]

## 2024-07-18 NOTE — PLAN OF CARE
Problem: Adult Inpatient Plan of Care  Goal: Plan of Care Review  Outcome: Progressing  Goal: Patient-Specific Goal (Individualized)  Outcome: Progressing  Goal: Absence of Hospital-Acquired Illness or Injury  Outcome: Progressing  Goal: Optimal Comfort and Wellbeing  Outcome: Progressing  Goal: Readiness for Transition of Care  Outcome: Progressing     Problem: Bariatric Environmental Safety  Goal: Safety Maintained with Care  Outcome: Progressing     Problem: Coping Ineffective  Goal: Effective Coping  Outcome: Progressing     Problem: Wound  Goal: Optimal Coping  Outcome: Progressing  Goal: Optimal Functional Ability  Outcome: Progressing  Goal: Absence of Infection Signs and Symptoms  Outcome: Progressing  Goal: Improved Oral Intake  Outcome: Progressing  Goal: Optimal Pain Control and Function  Outcome: Progressing  Goal: Skin Health and Integrity  Outcome: Progressing  Goal: Optimal Wound Healing  Outcome: Progressing     Problem: Fall Injury Risk  Goal: Absence of Fall and Fall-Related Injury  Outcome: Progressing     Problem: Skin Injury Risk Increased  Goal: Skin Health and Integrity  Outcome: Progressing     Problem: Gas Exchange Impaired  Goal: Optimal Gas Exchange  Outcome: Progressing     Problem: Infection  Goal: Absence of Infection Signs and Symptoms  Outcome: Progressing     Problem: Heart Failure  Goal: Optimal Coping  Outcome: Progressing  Goal: Optimal Cardiac Output  Outcome: Progressing  Goal: Stable Heart Rate and Rhythm  Outcome: Progressing  Goal: Optimal Functional Ability  Outcome: Progressing  Goal: Fluid and Electrolyte Balance  Outcome: Progressing  Goal: Improved Oral Intake  Outcome: Progressing  Goal: Effective Oxygenation and Ventilation  Outcome: Progressing  Goal: Effective Breathing Pattern During Sleep  Outcome: Progressing

## 2024-07-18 NOTE — PLAN OF CARE
[facility] - Card[facility] Intensive Care  Discharge Reassessment    Primary Care Provider: Gianni Escalona MD    Expected Discharge Date: 7/22/2024    Reassessment (most recent)       Discharge Reassessment - 07/18/24 1410          Discharge Reassessment    Assessment Type Discharge Planning Reassessment     Discharge Plan discussed with: Patient     Communicated ESTEBAN with patient/caregiver Date not available/Unable to determine     Discharge Plan A Home     Discharge Plan B Hospice/home     DME Needed Upon Discharge  none     Transition of Care Barriers None     Why the patient remains in the hospital Requires continued medical care                   Pt not stable for discharge at this time.  SW met with pt at bedside and answered all questions.  Discharge Plan A and Plan B have been determined by review of patient's clinical status, future medical and therapeutic needs, and coverage/benefits for post-acute care in coordination with multidisciplinary team members.  Will continue to follow.       Elisa Olivo, DOMINGO  [facility]  [phone]

## 2024-07-18 NOTE — SUBJECTIVE & OBJECTIVE
Interval History: ABAD Continues to be on BiPAP with sleep and HFNC during day     ROS  Objective:     Vital Signs (Most Recent):  Temp: 98.2 °F (36.8 °C) (07/18/24 1505)  Pulse: 98 (07/18/24 1505)  Resp: 15 (07/18/24 1505)  BP: (!) 101/55 (07/18/24 1505)  SpO2: (!) 92 % (07/18/24 1534) Vital Signs (24h Range):  Temp:  [97.5 °F (36.4 °C)-98.6 °F (37 °C)] 98.2 °F (36.8 °C)  Pulse:  [] 98  Resp:  [11-34] 15  SpO2:  [86 %-98 %] 92 %  BP: ()/(51-74) 101/55     Weight: 100.5 kg (221 lb 9 oz)  Body mass index is 36.87 kg/m².     SpO2: (!) 92 %         Intake/Output Summary (Last 24 hours) at 7/18/2024 1539  Last data filed at 7/18/2024 1505  Gross per 24 hour   Intake 1943.07 ml   Output 2815 ml   Net -871.93 ml       Lines/Drains/Airways       Central Venous Catheter Line  Duration             Percutaneous Central Line - Triple Lumen  07/08/24 1145 Internal Jugular Right 10 days              Peripheral Intravenous Line  Duration                  Peripheral IV - Single Lumen 07/16/24 1900 18 G Anterior;Right Forearm 1 day                       Physical Exam  Vitals reviewed.   Constitutional:       General: He is not in acute distress.     Appearance: He is obese. He is ill-appearing.   Cardiovascular:      Rate and Rhythm: Normal rate and regular rhythm.   Pulmonary:      Effort: No respiratory distress.      Breath sounds: No rhonchi.   Abdominal:      General: Abdomen is flat.   Musculoskeletal:      Right lower leg: No edema.      Left lower leg: No edema.   Skin:     General: Skin is warm.   Neurological:      Mental Status: He is alert.            Significant Labs: All pertinent lab results from the last 24 hours have been reviewed.    Significant Imaging: Echocardiogram: 2D echo with color flow doppler: No results found. However, due to the size of the patient record, not all encounters were searched. Please check Results Review for a complete set of results.

## 2024-07-18 NOTE — ASSESSMENT & PLAN NOTE
- patient not amenable to palliative options   - pt asked primary team to speak to sister about current medical state and hospice.

## 2024-07-18 NOTE — ASSESSMENT & PLAN NOTE
WHO group 2-3 per his managing pulmonologist (Danyell at [facility])  Adherent with diet, CPAP, and on continuous O2 3L at home.     -- RV dysfunction treatment as above  -- Pulm consulted, recs appreciated   -- Concern for RA per pulm, rheum consulted

## 2024-07-18 NOTE — PLAN OF CARE
CICU DAILY GOALS       A: Awake    RASS: Goal - RASS Goal: 0-->alert and calm  Actual - RASS (Carter Agitation-Sedation Scale): drowsy   Restraint necessity:    B: Breath   SBT: NA   C: Coordinate A & B, analgesics/sedatives   Pain: managed    SAT: NA  D: Delirium   CAM-ICU: Overall CAM-ICU: Negative  E: Early(intubated/ Progressive (non-intubated) Mobility   MOVE Screen:  na   Activity: Activity Management: Ambulated -L4, Ambulated in room - L4, Rolling - L1, Seated marching - L2, Sitting at edge of bed - L2, Standing - L3, Step forward and back - L3, Step side-to-side along edge of bed - L3, Up in chair - L3  FAS: Feeding/Nutrition   Diet order: Diet/Nutrition Received: low saturated fat/low cholesterol, 2 gram sodium, restrict fluids,   Fluid restriction: Fluid Requirement: 1.5L FR   Nutritional Supplement Intake: Quantity na, Type:  na  T: Thrombus   DVT prophylaxis: VTE Required Core Measure: (SCDs) Sequential compression device initiated/maintained  H: HOB Elevation   Head of Bed (HOB) Positioning: HOB at 90 degrees, other (see comments) (UTC)  U: Ulcer Prophylaxis   GI: yes  G: Glucose control   na       S: Skin   Nurses Note -- 4 Eyes  Skin assessed during: Daily Assessment   CHOOSE ONE:  [x] No Altered Skin Integrity Present      [x]Prevention Measures Documented    [] Yes- Altered Skin Integrity Present or Discovered     [] LDA Added if Not in Epic (Describe Wound)     [] New Altered Skin Integrity was Present on Admit and Documented in LDA     [] Wound Image Taken  Wound Care Consulted? Yes  Attending Nurse:  Danuta Natarajan RN/Staff Member:  ADRIEL Fernandez    B: Bowel Function   no issues   I: Indwelling Catheters   Dunne necessity:     CVC necessity: Yes   IPAD offered: Not appropriate  D: De-escalation Antibx   No  Plan for the day   Wean FiO2  Family/Goals of care/Code Status   Code Status: Full Code     No acute events throughout day, VS and assessment per flow sheet, patient progressing towards goals  as tolerated, plan of care reviewed with Yong Mcintyre and family, all concerns addressed, will continue to monitor.

## 2024-07-18 NOTE — ASSESSMENT & PLAN NOTE
xOn coumadin.    Lab Results   Component Value Date    INR 2.8 (H) 07/18/2024    INR 2.5 (H) 07/17/2024    INR 2.5 (H) 07/16/2024     -- Goal INR 2-3  -- Titrate warfarin daily    -- EP consulted for Aflutter, cardioverted 7/16

## 2024-07-18 NOTE — PLAN OF CARE
CICU DAILY GOALS       A: Awake    RASS: Goal - RASS Goal: 0-->alert and calm  Actual - RASS (Carter Agitation-Sedation Scale): alert and calm   Restraint necessity:    B: Breath   SBT: Not intubated   C: Coordinate A & B, analgesics/sedatives   Pain: managed    SAT: Not intubated  D: Delirium   CAM-ICU: Overall CAM-ICU: Negative  E: Early(intubated/ Progressive (non-intubated) Mobility   MOVE Screen: Pass   Activity: Activity Management: Rolling - L1  FAS: Feeding/Nutrition   Diet order: Diet/Nutrition Received: 2 gram sodium,   Fluid restriction: Fluid Requirement: 1.5L FR   Nutritional Supplement Intake: Quantity none, Type:  none  T: Thrombus   DVT prophylaxis: VTE Required Core Measure: Pharmacological prophylaxis initiated/maintained  H: HOB Elevation   Head of Bed (HOB) Positioning: HOB elevated  U: Ulcer Prophylaxis   GI: yes  G: Glucose control   managed      S: Skin   Nurses Note -- 4 Eyes  Skin assessed during: Q Shift Change   CHOOSE ONE:  [] No Altered Skin Integrity Present      []Prevention Measures Documented    [] Yes- Altered Skin Integrity Present or Discovered     [] LDA Added if Not in Epic (Describe Wound)     [] New Altered Skin Integrity was Present on Admit and Documented in LDA     [] Wound Image Taken  Wound Care Consulted? No  Attending Nurse:  ADRIEL Cash   Second RN/Staff Member:  ADRIEL Fernandez    B: Bowel Function   no issues   I: Indwelling Catheters   Dunne necessity:     CVC necessity: Yes   IPAD offered: No  D: De-escalation Antibx   Yes  Plan for the day   Continue to monitor fluid volume status and oxygen requirements.  Family/Goals of care/Code Status   Code Status: Full Code     No acute events throughout day, VS and assessment per flow sheet, patient progressing towards goals as tolerated, plan of care reviewed with Yong Mcintyre and family, all concerns addressed, will continue to monitor.    Problem: Adult Inpatient Plan of Care  Goal: Plan of Care Review  Outcome:  Progressing     Problem: Heart Failure  Goal: Optimal Cardiac Output  Outcome: Progressing  Goal: Fluid and Electrolyte Balance  Outcome: Progressing

## 2024-07-19 LAB
ALBUMIN SERPL BCP-MCNC: 2.6 G/DL (ref 3.5–5.2)
ALLENS TEST: ABNORMAL
ALLENS TEST: ABNORMAL
ALP SERPL-CCNC: 124 U/L (ref 55–135)
ALT SERPL W/O P-5'-P-CCNC: 8 U/L (ref 10–44)
ANION GAP SERPL CALC-SCNC: 7 MMOL/L (ref 8–16)
ANION GAP SERPL CALC-SCNC: 9 MMOL/L (ref 8–16)
AST SERPL-CCNC: 15 U/L (ref 10–40)
BASOPHILS # BLD AUTO: 0.05 K/UL (ref 0–0.2)
BASOPHILS NFR BLD: 0.9 % (ref 0–1.9)
BILIRUB SERPL-MCNC: 1.2 MG/DL (ref 0.1–1)
BUN SERPL-MCNC: 44 MG/DL (ref 6–20)
BUN SERPL-MCNC: 46 MG/DL (ref 6–20)
CALCIUM SERPL-MCNC: 9.2 MG/DL (ref 8.7–10.5)
CALCIUM SERPL-MCNC: 9.5 MG/DL (ref 8.7–10.5)
CHLORIDE SERPL-SCNC: 93 MMOL/L (ref 95–110)
CHLORIDE SERPL-SCNC: 93 MMOL/L (ref 95–110)
CO2 SERPL-SCNC: 36 MMOL/L (ref 23–29)
CO2 SERPL-SCNC: 39 MMOL/L (ref 23–29)
CREAT SERPL-MCNC: 1.7 MG/DL (ref 0.5–1.4)
CREAT SERPL-MCNC: 1.8 MG/DL (ref 0.5–1.4)
DELSYS: ABNORMAL
DIFFERENTIAL METHOD BLD: ABNORMAL
EOSINOPHIL # BLD AUTO: 0 K/UL (ref 0–0.5)
EOSINOPHIL NFR BLD: 0.5 % (ref 0–8)
ERYTHROCYTE [DISTWIDTH] IN BLOOD BY AUTOMATED COUNT: 22.1 % (ref 11.5–14.5)
EST. GFR  (NO RACE VARIABLE): 45.9 ML/MIN/1.73 M^2
EST. GFR  (NO RACE VARIABLE): 49.1 ML/MIN/1.73 M^2
FIO2: 65
FLOW: 60
GLUCOSE SERPL-MCNC: 90 MG/DL (ref 70–110)
GLUCOSE SERPL-MCNC: 92 MG/DL (ref 70–110)
HCO3 UR-SCNC: 41.2 MMOL/L (ref 24–28)
HCO3 UR-SCNC: 43 MMOL/L (ref 24–28)
HCT VFR BLD AUTO: 46.3 % (ref 40–54)
HGB BLD-MCNC: 12.8 G/DL (ref 14–18)
IMM GRANULOCYTES # BLD AUTO: 0.01 K/UL (ref 0–0.04)
IMM GRANULOCYTES NFR BLD AUTO: 0.2 % (ref 0–0.5)
INR PPP: 2.6 (ref 0.8–1.2)
LYMPHOCYTES # BLD AUTO: 1 K/UL (ref 1–4.8)
LYMPHOCYTES NFR BLD: 18.1 % (ref 18–48)
MAGNESIUM SERPL-MCNC: 1.8 MG/DL (ref 1.6–2.6)
MAGNESIUM SERPL-MCNC: 2.2 MG/DL (ref 1.6–2.6)
MCH RBC QN AUTO: 24.2 PG (ref 27–31)
MCHC RBC AUTO-ENTMCNC: 27.6 G/DL (ref 32–36)
MCV RBC AUTO: 88 FL (ref 82–98)
MODE: ABNORMAL
MONOCYTES # BLD AUTO: 0.5 K/UL (ref 0.3–1)
MONOCYTES NFR BLD: 9.2 % (ref 4–15)
NEUTROPHILS # BLD AUTO: 3.9 K/UL (ref 1.8–7.7)
NEUTROPHILS NFR BLD: 71.1 % (ref 38–73)
NRBC BLD-RTO: 0 /100 WBC
OHS QRS DURATION: 112 MS
OHS QTC CALCULATION: 467 MS
PCO2 BLDA: 77.5 MMHG (ref 35–45)
PCO2 BLDA: 77.8 MMHG (ref 35–45)
PH SMN: 7.33 [PH] (ref 7.35–7.45)
PH SMN: 7.35 [PH] (ref 7.35–7.45)
PHOSPHATE SERPL-MCNC: 4 MG/DL (ref 2.7–4.5)
PLATELET # BLD AUTO: 134 K/UL (ref 150–450)
PMV BLD AUTO: 11 FL (ref 9.2–12.9)
PO2 BLDA: 32 MMHG (ref 40–60)
PO2 BLDA: 39 MMHG (ref 40–60)
POC BE: 15 MMOL/L
POC BE: 17 MMOL/L
POC SATURATED O2: 54 % (ref 95–100)
POC SATURATED O2: 66 % (ref 95–100)
POC TCO2: 44 MMOL/L (ref 24–29)
POC TCO2: 45 MMOL/L (ref 24–29)
POCT GLUCOSE: 102 MG/DL (ref 70–110)
POTASSIUM SERPL-SCNC: 3 MMOL/L (ref 3.5–5.1)
POTASSIUM SERPL-SCNC: 3.5 MMOL/L (ref 3.5–5.1)
PROT SERPL-MCNC: 7.6 G/DL (ref 6–8.4)
PROTHROMBIN TIME: 27 SEC (ref 9–12.5)
RBC # BLD AUTO: 5.29 M/UL (ref 4.6–6.2)
SAMPLE: ABNORMAL
SAMPLE: ABNORMAL
SITE: ABNORMAL
SITE: ABNORMAL
SODIUM SERPL-SCNC: 138 MMOL/L (ref 136–145)
SODIUM SERPL-SCNC: 139 MMOL/L (ref 136–145)
WBC # BLD AUTO: 5.52 K/UL (ref 3.9–12.7)

## 2024-07-19 PROCEDURE — 94640 AIRWAY INHALATION TREATMENT: CPT

## 2024-07-19 PROCEDURE — 80053 COMPREHEN METABOLIC PANEL: CPT

## 2024-07-19 PROCEDURE — 63600175 PHARM REV CODE 636 W HCPCS: Performed by: STUDENT IN AN ORGANIZED HEALTH CARE EDUCATION/TRAINING PROGRAM

## 2024-07-19 PROCEDURE — 94761 N-INVAS EAR/PLS OXIMETRY MLT: CPT | Mod: XB

## 2024-07-19 PROCEDURE — 99231 SBSQ HOSP IP/OBS SF/LOW 25: CPT | Mod: GC,,, | Performed by: INTERNAL MEDICINE

## 2024-07-19 PROCEDURE — 25000003 PHARM REV CODE 250: Performed by: INTERNAL MEDICINE

## 2024-07-19 PROCEDURE — 63600175 PHARM REV CODE 636 W HCPCS

## 2024-07-19 PROCEDURE — 27100171 HC OXYGEN HIGH FLOW UP TO 24 HOURS

## 2024-07-19 PROCEDURE — 25000003 PHARM REV CODE 250: Performed by: STUDENT IN AN ORGANIZED HEALTH CARE EDUCATION/TRAINING PROGRAM

## 2024-07-19 PROCEDURE — 99233 SBSQ HOSP IP/OBS HIGH 50: CPT | Mod: ,,, | Performed by: INTERNAL MEDICINE

## 2024-07-19 PROCEDURE — 99497 ADVNCD CARE PLAN 30 MIN: CPT | Mod: ,,, | Performed by: INTERNAL MEDICINE

## 2024-07-19 PROCEDURE — 94660 CPAP INITIATION&MGMT: CPT

## 2024-07-19 PROCEDURE — 25000003 PHARM REV CODE 250

## 2024-07-19 PROCEDURE — 94799 UNLISTED PULMONARY SVC/PX: CPT

## 2024-07-19 PROCEDURE — 20000000 HC ICU ROOM

## 2024-07-19 PROCEDURE — 85025 COMPLETE CBC W/AUTO DIFF WBC: CPT

## 2024-07-19 PROCEDURE — 82803 BLOOD GASES ANY COMBINATION: CPT

## 2024-07-19 PROCEDURE — 84100 ASSAY OF PHOSPHORUS: CPT

## 2024-07-19 PROCEDURE — 99900035 HC TECH TIME PER 15 MIN (STAT)

## 2024-07-19 PROCEDURE — 85610 PROTHROMBIN TIME: CPT

## 2024-07-19 PROCEDURE — 83735 ASSAY OF MAGNESIUM: CPT

## 2024-07-19 PROCEDURE — 99900031 HC PATIENT EDUCATION (STAT)

## 2024-07-19 RX ORDER — MAGNESIUM SULFATE HEPTAHYDRATE 40 MG/ML
2 INJECTION, SOLUTION INTRAVENOUS ONCE
Status: COMPLETED | OUTPATIENT
Start: 2024-07-19 | End: 2024-07-19

## 2024-07-19 RX ORDER — WARFARIN SODIUM 5 MG/1
5 TABLET ORAL
Status: DISCONTINUED | OUTPATIENT
Start: 2024-07-19 | End: 2024-07-24

## 2024-07-19 RX ORDER — POTASSIUM CHLORIDE 29.8 MG/ML
40 INJECTION INTRAVENOUS ONCE
Status: COMPLETED | OUTPATIENT
Start: 2024-07-19 | End: 2024-07-19

## 2024-07-19 RX ORDER — POTASSIUM CHLORIDE 20 MEQ/1
40 TABLET, EXTENDED RELEASE ORAL ONCE
Status: COMPLETED | OUTPATIENT
Start: 2024-07-19 | End: 2024-07-19

## 2024-07-19 RX ADMIN — ALBUTEROL SULFATE 2 PUFF: 108 INHALANT RESPIRATORY (INHALATION) at 07:07

## 2024-07-19 RX ADMIN — AMIODARONE HYDROCHLORIDE 400 MG: 200 TABLET ORAL at 08:07

## 2024-07-19 RX ADMIN — DOBUTAMINE HYDROCHLORIDE 5 MCG/KG/MIN: 400 INJECTION INTRAVENOUS at 06:07

## 2024-07-19 RX ADMIN — FLUTICASONE PROPIONATE AND SALMETEROL 1 PUFF: 50; 250 POWDER RESPIRATORY (INHALATION) at 07:07

## 2024-07-19 RX ADMIN — CHLOROTHIAZIDE SODIUM 250 MG: 500 INJECTION, POWDER, LYOPHILIZED, FOR SOLUTION INTRAVENOUS at 01:07

## 2024-07-19 RX ADMIN — PANTOPRAZOLE SODIUM 40 MG: 40 TABLET, DELAYED RELEASE ORAL at 08:07

## 2024-07-19 RX ADMIN — FLUTICASONE PROPIONATE AND SALMETEROL 1 PUFF: 50; 250 POWDER RESPIRATORY (INHALATION) at 08:07

## 2024-07-19 RX ADMIN — FUROSEMIDE 40 MG/HR: 10 INJECTION, SOLUTION INTRAMUSCULAR; INTRAVENOUS at 11:07

## 2024-07-19 RX ADMIN — POTASSIUM CHLORIDE 40 MEQ: 29.8 INJECTION, SOLUTION INTRAVENOUS at 02:07

## 2024-07-19 RX ADMIN — SENNOSIDES AND DOCUSATE SODIUM 1 TABLET: 50; 8.6 TABLET ORAL at 08:07

## 2024-07-19 RX ADMIN — ALLOPURINOL 300 MG: 100 TABLET ORAL at 08:07

## 2024-07-19 RX ADMIN — ALBUTEROL SULFATE 2 PUFF: 108 INHALANT RESPIRATORY (INHALATION) at 01:07

## 2024-07-19 RX ADMIN — WARFARIN SODIUM 5 MG: 5 TABLET ORAL at 05:07

## 2024-07-19 RX ADMIN — MAGNESIUM SULFATE HEPTAHYDRATE 2 G: 40 INJECTION, SOLUTION INTRAVENOUS at 01:07

## 2024-07-19 RX ADMIN — ACETAZOLAMIDE 500 MG: 250 TABLET ORAL at 08:07

## 2024-07-19 RX ADMIN — POTASSIUM CHLORIDE 40 MEQ: 1500 TABLET, EXTENDED RELEASE ORAL at 01:07

## 2024-07-19 RX ADMIN — FUROSEMIDE 40 MG/HR: 10 INJECTION, SOLUTION INTRAMUSCULAR; INTRAVENOUS at 03:07

## 2024-07-19 RX ADMIN — QUETIAPINE FUMARATE 50 MG: 25 TABLET ORAL at 08:07

## 2024-07-19 NOTE — ASSESSMENT & PLAN NOTE
Acute on chronic. - NYHA class III symptoms with recurrent admissions.  - He was seen by hospitals outpatient 6/2024 who state patient was feeling better on new diuretic regimen however worried about the frequency of use of metolazone and his worsening kidney function.    - Admitted with CXR with cardiomegaly and pulmonary vascular congestion, suggestive of pulmonary edema secondary to CHF. BNP elevated at 800. Creatinine worse at 3.1 on admission.  - Given his recurrent HF admissions and most recent hemodynamics, hospitals believes his overall prognosis is poor and recommended palliative care consult. Palliative care evaluated patient. Patient wishes to continue full measures at this time.     - 2 gram sodium restriction and 1500cc fluid restriction.  - Encourage physical activity with graded exercise program.  - Continue central line for CVP monitoring  - Continue Acetazolamide PO  - lasix gtt   - Continue dobutamine gtt  - amio PO  - Diuril as needed  - Monitor lytes BID; replete as indicated

## 2024-07-19 NOTE — PLAN OF CARE
CICU DAILY GOALS       A: Awake    RASS: Goal - RASS Goal: 0-->alert and calm  Actual - RASS (Carter Agitation-Sedation Scale): alert and calm   Restraint necessity:    B: Breath   SBT: Not intubated   C: Coordinate A & B, analgesics/sedatives   Pain: managed    SAT: Not intubated  D: Delirium   CAM-ICU: Overall CAM-ICU: Negative  E: Early(intubated/ Progressive (non-intubated) Mobility   MOVE Screen:  NA   Activity: Activity Management: Up to bedside commode - L3  FAS: Feeding/Nutrition   Diet order: Diet/Nutrition Received: low saturated fat/low cholesterol, 2 gram sodium, no added salt,   Fluid restriction: Fluid Requirement: 1.5L FR   Nutritional Supplement Intake: Quantity Na, Type:  NA  T: Thrombus   DVT prophylaxis: VTE Required Core Measure: (SCDs) Sequential compression device initiated/maintained  H: HOB Elevation   Head of Bed (HOB) Positioning: HOB at 20-30 degrees  U: Ulcer Prophylaxis   GI: yes  G: Glucose control   managed      S: Skin   Nurses Note -- 4 Eyes  Skin assessed during: Q Shift Change   CHOOSE ONE:  [] No Altered Skin Integrity Present      []Prevention Measures Documented    [] Yes- Altered Skin Integrity Present or Discovered     [] LDA Added if Not in Epic (Describe Wound)     [] New Altered Skin Integrity was Present on Admit and Documented in LDA     [] Wound Image Taken  Wound Care Consulted? No  Attending Nurse:  Ирина Akhtar  Second RN/Staff Member:  ИРИНА Fernandez    B: Bowel Function   no issues   I: Indwelling Catheters   Dunne necessity:     CVC necessity: Yes   IPAD offered: No  D: De-escalation Antibx   NA   Plan for the day   Pallative discussions.   Family/Goals of care/Code Status   Code Status: Full Code     No acute events throughout day, VS and assessment per flow sheet, patient progressing towards goals as tolerated, plan of care reviewed with Yong Mcintyre and family, all concerns addressed, will continue to monitor.

## 2024-07-19 NOTE — ASSESSMENT & PLAN NOTE
Uncertain Causes of Chest Pain    Chest pain can happen for a number of reasons. Sometimes the cause can't be determined. If your condition does not seem serious, and your pain does not appear to be coming from your heart, your healthcare provider may recommend watching it closely. Sometimes the signs of a serious problem take more time to appear. Many problems not related to your heart can cause chest pain.These include:  · Musculoskeletal. Costochondritis, an inflammation of the tissues around the ribs that can occur from trauma or overuse injuries  · Respiratory. Pneumonia, pneumothorax, or pneumonitis (inflammation of the lining of the chest and lungs)  · Gastrointestinal. Esophageal reflux, heartburn, or gallbladder disease  · Anxiety and panic disorders  · Nerve compression and neuritis  · Miscellaneous problems such as aortic aneurysm or pulmonary embolism (a blood clot in the lungs)  Home care  After your visit, follow these recommendations:  · Rest today and avoid strenuous activity.  · Take any prescribed medicine as directed.  · Be aware of any recurrent chest pain and notice any changes  Follow-up care  Follow up with your healthcare provider if you do not start to feel better within 24 hours, or as advised.  Call 911  Call 911 if any of these occur:  · A change in the type of pain: if it feels different, becomes more severe, lasts longer, or begins to spread into your shoulder, arm, neck, jaw or back  · Shortness of breath or increased pain with breathing  · Weakness, dizziness, or fainting  · Rapid heart beat  · Crushing sensation in your chest  When to seek medical advice  Call your healthcare provider right away if any of the following occur:  · Cough with dark colored sputum (phlegm) or blood  · Fever of 100.4ºF (38ºC) or higher, or as directed by your healthcare provider  · Swelling, pain or redness in one leg  · Shortness of breath  Date Last Reviewed: 12/30/2015  © [id number] [facility]  Impression: Pt is a 47 y/o male with severe Pulmonary HTN.Pt is AAOx4. Pt is on 60L at 64%    Pulm HTN/ILD/paroxysmal a-fib/atrial flutter/acute on chronic right sides heart failure/acute hypoxic resp failure  -management per primary team and other consultants     Code status: Full     Surrogate decision maker: Has HCPOA document naming father. In meeting today he said he would want his sister Paula to be his surrogate decision maker. Needs further clarification. But sounds like family would ultimately make decisions together     GOC/ACP  -Met with patient at bedside. Introduced palliative medicine. He did not remember prior conversations with [facility] [employer]. Patient was very surprised to hear he was on 60L. After much explaining and reinforcing he was able to understand this is much more than his home 3-4L . He said he wished he would have known how bad things were. Said he was expecting to be discharged next week to drive his new car. His plan was to move to [place] to be closer to family. Explained that unfortunately the amount of O2 he is on can only be done in the hospital. Began discussion of options moving forward - continuing what we are doing here in the hospital until he no longer finds it acceptable or decompensates further. Other option would be weaning it off with comfort meds in which he is likely to die here (did not go into detail about this). Pt says he feels like his parents would want to keep him alive forever even if on machines. When asked how he feels about this he said he is just hoping this doesn't actually happen.    -Pt has asked that primary team reach out to his sister to explain dx and prognosis. They have not had a chance to do this yet. Strongly recommend getting family involved in discussions to support patient   -Pt is not ready to transition to comfort focused care   -Will continue to follow     Discussed with Dr. Garcia         [employer]. [address] [place] [address], [place] [address]. All rights reserved. This information is not intended as a substitute for professional medical care. Always follow your healthcare professional's instructions.

## 2024-07-19 NOTE — ASSESSMENT & PLAN NOTE
WHO group 2-3 per his managing pulmonologist (Danyell at [facility])  Adherent with diet, CPAP, and on continuous O2 3L at home.     -- RV dysfunction treatment as above  -- Pulm consulted, recs appreciated   -- Concern for RA per pulm, rheum consulted

## 2024-07-19 NOTE — SUBJECTIVE & OBJECTIVE
Interval History: Patient was seen and evaluated at bedside. NAD; still requiring HF oxygen and intermittent BIPAP. Agreed to be evaluated by palliative today.     ROS  Objective:     Vital Signs (Most Recent):  Temp: 97.5 °F (36.4 °C) (07/19/24 0301)  Pulse: 93 (07/19/24 0830)  Resp: 19 (07/19/24 0830)  BP: (!) 114/59 (07/19/24 0830)  SpO2: 98 % (07/19/24 0830) Vital Signs (24h Range):  Temp:  [97.5 °F (36.4 °C)-98.4 °F (36.9 °C)] 97.5 °F (36.4 °C)  Pulse:  [] 93  Resp:  [12-31] 19  SpO2:  [86 %-98 %] 98 %  BP: ()/(55-68) 114/59     Weight: 100.5 kg (221 lb 9 oz)  Body mass index is 36.87 kg/m².     SpO2: 98 %         Intake/Output Summary (Last 24 hours) at 7/19/2024 0909  Last data filed at 7/19/2024 0830  Gross per 24 hour   Intake 1343.5 ml   Output 4200 ml   Net -2856.5 ml       Lines/Drains/Airways       Central Venous Catheter Line  Duration             Percutaneous Central Line - Triple Lumen  07/08/24 1145 Internal Jugular Right 10 days              Peripheral Intravenous Line  Duration                  Peripheral IV - Single Lumen 07/16/24 1900 18 G Anterior;Right Forearm 2 days                       Physical Exam  Vitals reviewed.   Constitutional:       General: He is not in acute distress.     Appearance: He is obese. He is ill-appearing.   Cardiovascular:      Rate and Rhythm: Normal rate and regular rhythm.   Pulmonary:      Effort: No respiratory distress.      Breath sounds: No rhonchi.   Abdominal:      General: Abdomen is flat.   Musculoskeletal:      Right lower leg: No edema.      Left lower leg: No edema.   Skin:     General: Skin is warm.   Neurological:      Mental Status: He is alert.          Significant Labs:   Recent Labs   Lab 07/17/24  0305 07/17/24  1042 07/18/24  0211 07/19/24  0618   WBC 5.46  --  5.37 5.52   HGB 12.6*  --  12.7* 12.8*   HCT 45.4 47 46.9 46.3   *  --  147* 134*       Recent Labs   Lab 07/17/24  0305 07/17/24  1218 07/18/24  0211 07/18/24  2742  07/19/24  0616      < > 138 138 139   K 3.3*   < > 3.2* 3.0* 3.5   CL 93*   < > 92* 93* 93*   CO2 37*   < > 38* 36* 39*   BUN 51*   < > 49* 46* 44*   CREATININE 1.9*   < > 2.0* 1.7* 1.8*   CALCIUM 9.7   < > 9.4 9.2 9.5   PHOS 4.5  --  4.4  --  4.0        Recent Labs   Lab 07/17/24  0305 07/18/24  0211 07/19/24  0616   ALKPHOS 143* 141* 124   BILITOT 0.9 0.9 1.2*   PROT 7.5 7.6 7.6   ALT 9* 8* 8*   AST 18 16 15

## 2024-07-19 NOTE — PROGRESS NOTES
[facility] - [facility]  Cardiology  Progress Note    Patient Name: Yong Mcintyre  MRN: [id number]  Admission Date: 7/4/2024  Hospital Length of Stay: 15 days  Code Status: Full Code   Attending Physician: Rossy Leary MD   Primary Care Physician: Gianni Escalona MD  Expected Discharge Date: 7/25/2024  Principal Problem:Right ventricular dysfunction    Subjective:     Hospital Course:   The patient was admitted to the CCU for further management of right-sided heart failure. He was diuresed with a lasix gtt. Electrolytes were monitored and repleted as indicated. Palliative care was consulted given his poor prognosis. The patient had significant O2 requirements on admission which were slow to wean. A central line was placed for close CVP monitoring in the setting of aggressive diuresis. Nutrition consult placed for low salt diet education. Acetazolamide was added to his diuretic regimen due to persistent metabolic alkalosis. The patient required intermittent Bipap due to hypercapnic respiratory failure. Dobutamine was added to augment cardiac output to further diurese patient. The patient was in aflutter, EP was consulted. S/p cardioversion 7/16/24 with successful conversion to sinus rhythm. Pulm consulted for hypercapnia. Per pulm ok to remain on HFNC. Presentation concerning for end stage RA causing pum HTN, rheum consulted and recommendations appreciated. No therapies available, but rheum has ordered work up. Switched from IV to PO amio. Continued diamox. Patient will like to consider Palliative care.     Interval History: Patient was seen and evaluated at bedside. NAD; still requiring HF oxygen and intermittent BIPAP. Agreed to be evaluated by palliative today.     ROS  Objective:     Vital Signs (Most Recent):  Temp: 97.5 °F (36.4 °C) (07/19/24 0301)  Pulse: 93 (07/19/24 0830)  Resp: 19 (07/19/24 0830)  BP: (!) 114/59 (07/19/24 0830)  SpO2: 98 % (07/19/24 0830) Vital Signs (24h  Range):  Temp:  [97.5 °F (36.4 °C)-98.4 °F (36.9 °C)] 97.5 °F (36.4 °C)  Pulse:  [] 93  Resp:  [12-31] 19  SpO2:  [86 %-98 %] 98 %  BP: ()/(55-68) 114/59     Weight: 100.5 kg (221 lb 9 oz)  Body mass index is 36.87 kg/m².     SpO2: 98 %         Intake/Output Summary (Last 24 hours) at 7/19/2024 0909  Last data filed at 7/19/2024 0830  Gross per 24 hour   Intake 1343.5 ml   Output 4200 ml   Net -2856.5 ml       Lines/Drains/Airways       Central Venous Catheter Line  Duration             Percutaneous Central Line - Triple Lumen  07/08/24 1145 Internal Jugular Right 10 days              Peripheral Intravenous Line  Duration                  Peripheral IV - Single Lumen 07/16/24 1900 18 G Anterior;Right Forearm 2 days                       Physical Exam  Vitals reviewed.   Constitutional:       General: He is not in acute distress.     Appearance: He is obese. He is ill-appearing.   Cardiovascular:      Rate and Rhythm: Normal rate and regular rhythm.   Pulmonary:      Effort: No respiratory distress.      Breath sounds: No rhonchi.   Abdominal:      General: Abdomen is flat.   Musculoskeletal:      Right lower leg: No edema.      Left lower leg: No edema.   Skin:     General: Skin is warm.   Neurological:      Mental Status: He is alert.          Significant Labs:   Recent Labs   Lab 07/17/24  0305 07/17/24  1042 07/18/24  0211 07/19/24  0618   WBC 5.46  --  5.37 5.52   HGB 12.6*  --  12.7* 12.8*   HCT 45.4 47 46.9 46.3   *  --  147* 134*       Recent Labs   Lab 07/17/24  0305 07/17/24  1218 07/18/24  0211 07/18/24  2355 07/19/24  0616      < > 138 138 139   K 3.3*   < > 3.2* 3.0* 3.5   CL 93*   < > 92* 93* 93*   CO2 37*   < > 38* 36* 39*   BUN 51*   < > 49* 46* 44*   CREATININE 1.9*   < > 2.0* 1.7* 1.8*   CALCIUM 9.7   < > 9.4 9.2 9.5   PHOS 4.5  --  4.4  --  4.0        Recent Labs   Lab 07/17/24  0305 07/18/24  0211 07/19/24  0616   ALKPHOS 143* 141* 124   BILITOT 0.9 0.9 1.2*   PROT 7.5 7.6  7.6   ALT 9* 8* 8*   AST 18 16 15     Assessment and Plan:     * Right ventricular dysfunction  Acute on chronic. - NYHA class III symptoms with recurrent admissions.  - He was seen by [facility] outpatient 6/2024 who state patient was feeling better on new diuretic regimen however worried about the frequency of use of metolazone and his worsening kidney function.    - Admitted with CXR with cardiomegaly and pulmonary vascular congestion, suggestive of pulmonary edema secondary to CHF. BNP elevated at 800. Creatinine worse at 3.1 on admission.  - Given his recurrent HF admissions and most recent hemodynamics, [facility] believes his overall prognosis is poor and recommended palliative care consult. Palliative care evaluated patient. Patient wishes to continue full measures at this time.     - 2 gram sodium restriction and 1500cc fluid restriction.  - Encourage physical activity with graded exercise program.  - Continue central line for CVP monitoring  - Continue Acetazolamide PO  - lasix gtt   - Continue dobutamine gtt  - amio PO  - Diuril as needed  - Monitor lytes BID; replete as indicated    Atypical atrial flutter  - New onset  - On warfarin for Afib  - Patient on warfarin but has been subtherapeutic, will need to assess risk of cardioversion vs benefit to avoid HF in a patient with HF and tachycardia    - Continue AC as per Afib  - Started Amiodarone for rhythm control  - Monitor electrolytes  -continued in Aflutter consulted EP, s/p DCCV/DAWIT on 7/16      Metabolic alkalosis  Bicarb 40's in setting of aggressive diuresis. Suspect contraction alkalosis.    - Continue diamox  - Trend Bicarb    Acute on chronic respiratory failure with hypoxia  Patient with Hypercapnic and Hypoxic Respiratory failure which is Acute on chronic.  he is on home oxygen at 3 LPM. Supplemental oxygen was provided and noted- Oxygen Concentration (%):  [55-60] 60    Signs/symptoms of respiratory failure include- tachypnea and increased work of  breathing. Contributing diagnoses includes - CHF Labs and images were reviewed. Patient Has recent ABG, which has been reviewed.    -- Trend VBG BID  -- BiPap during night and naps; okay for HFNC throughout the day per pulm  -- Goal SpO2 > 88%    Acute on chronic right-sided congestive heart failure  - See 'RV dysfunction'    Paroxysmal A-fib  xOn coumadin.    Lab Results   Component Value Date    INR 2.8 (H) 07/18/2024    INR 2.5 (H) 07/17/2024    INR 2.5 (H) 07/16/2024     -- Goal INR 2-3  -- Titrate warfarin daily    -- EP consulted for Aflutter, cardioverted 7/16    Palliative care encounter  - patient not amenable to palliative options   - pt asked primary team to speak to sister about current medical state and hospice.     MALLIKA (obstructive sleep apnea)  See 'hypoventilation syndrome'    Pulmonary hypertension  WHO group 2-3 per his managing pulmonologist (Danyell at [facility])  Adherent with diet, CPAP, and on continuous O2 3L at home.     -- RV dysfunction treatment as above  -- Pulm consulted, recs appreciated   -- Concern for RA per pulm, rheum consulted    Hypoventilation syndrome  BIPAP QHS; NC during meals. Avoid HFNC.  Significant hypercapnia w/ pCO2 80-90; will start daytime BiPap    - BIPAP for ventilation preferred over high O2          VTE Risk Mitigation (From admission, onward)           Ordered     warfarin (COUMADIN) tablet 5 mg  Once per day on Sunday Monday Wednesday Friday Saturday 07/19/24 0830     IP VTE HIGH RISK PATIENT  Once         07/04/24 2302     Place sequential compression device  Until discontinued         07/04/24 2302                    Amanda Garcia MD  Cardiology  [facility] - [facility]

## 2024-07-19 NOTE — PROGRESS NOTES
[facility] - Cardiac Intensive Care  Palliative Medicine  Progress Note    Patient Name: Yong Mcintyre  MRN: [id number]  Admission Date: 7/4/2024  Hospital Length of Stay: 15 days  Code Status: Full Code   Attending Provider: Rossy Leary MD  Consulting Provider: Soniya Reynolds MD  Primary Care Physician: Gianni Escalona MD  Principal Problem:Right ventricular dysfunction    Patient information was obtained from patient and primary team.      Assessment/Plan:     Palliative Care  Palliative care encounter  Impression: Pt is a 47 y/o male with severe Pulmonary HTN.Pt is AAOx4. Pt is on 60L at 64%    Pulm HTN/ILD/paroxysmal a-fib/atrial flutter/acute on chronic right sides heart failure/acute hypoxic resp failure  -management per primary team and other consultants     Code status: Full     Surrogate decision maker: Has HCPOA document naming father. In meeting today he said he would want his sister Paula to be his surrogate decision maker. Needs further clarification. But sounds like family would ultimately make decisions together     GOC/ACP  -Met with patient at bedside. Introduced palliative medicine. He did not remember prior conversations with [facility] [employer]. Patient was very surprised to hear he was on 60L. After much explaining and reinforcing he was able to understand this is much more than his home 3-4L . He said he wished he would have known how bad things were. Said he was expecting to be discharged next week to drive his new car. His plan was to move to [place] to be closer to family. Explained that unfortunately the amount of O2 he is on can only be done in the hospital. Began discussion of options moving forward - continuing what we are doing here in the hospital until he no longer finds it acceptable or decompensates further. Other option would be weaning it off with comfort meds in which he is likely to die here (did not go into detail about this). Pt says he feels like his parents would  want to keep him alive forever even if on machines. When asked how he feels about this he said he is just hoping this doesn't actually happen.  20 minutes spent discussing ACP    -Pt has asked that primary team reach out to his sister to explain dx and prognosis. They have not had a chance to do this yet. Strongly recommend getting family involved in discussions to support patient   -Pt is not ready to transition to comfort focused care   -Will continue to follow     Discussed with Dr. Garcia              I will follow-up with patient. Please contact us if you have any additional questions.    Subjective:     Chief Complaint:   Chief Complaint   Patient presents with    Leg Swelling     On home oxygen 3l, gave self 3 puffs albuterol in triage, said got sob with walking        HPI:   Pt is a 48 y.o. male with severe pulmonary HTN (Group 2-3, on 3L home O2, diagnosed prior to 2015, follows with Dr. Child at [facility], MALLIKA, Obesity hypoventilation syndrome, HFpEF, Paroxysmal AFib (on Coumadin), BMI > 35, HTN who presents for worsening shortness of breath and lower extremity swelling over the past 2 weeks. This is his 5th admission this year. He reports compliance with his medications. His diuretic regimen was adjusted to the following; Torsemide 60 mg twice daily and metolazone 2.5 on M/WF/Sunday. Clinically reports NYHA class III symptoms. He reports his dry weight is 223lbs and is currently weighing in at 245lbs. He uses 3L of O2 at home and is currently on high flow 35L at 45%     Hospital Course:  No notes on file    Interval History: Pt on 60L at 64%. Denies feeling short of breath     Past Medical History:   Diagnosis Date    Arthritis     CHF (congestive heart failure)     Diastolic dysfunction    Cor pulmonale 11/05/2012    Gallstones     GERD (gastroesophageal reflux disease)     Hypertension     Morbid obesity 11/05/2012    Obesity hypoventilation syndrome     On home oxygen therapy 03/07/2014    MALLIKA  "(obstructive sleep apnea)     BPAP 16/11 with 3 L at night (continuous O2).    Paroxysmal atrial fibrillation     PFO (patent foramen ovale) 11/05/2012    status post closure    Pickwickian syndrome 11/05/2012    Pulmonary hypertension        Past Surgical History:   Procedure Laterality Date    CHOLECYSTECTOMY      RIGHT HEART CATHETERIZATION Right 03/22/2022    Procedure: INSERTION, CATHETER, RIGHT HEART;  Surgeon: Tony Martínez MD;  Location: [facility] CATH LAB;  Service: Cardiology;  Laterality: Right;    RIGHT HEART CATHETERIZATION Right 01/31/2024    Procedure: INSERTION, CATHETER, RIGHT HEART;  Surgeon: Sheri Ritter MD;  Location: [facility] CATH LAB;  Service: Cardiology;  Laterality: Right;    UPPER GASTROINTESTINAL ENDOSCOPY      2-3 years ago       Review of patient's allergies indicates:   Allergen Reactions    Lipitor [atorvastatin] Other (See Comments)     "it makes my legs feel like jelly"  Other reaction(s): causes legs to hurt       Medications:  Continuous Infusions:   DOBUTamine IV infusion (non-titrating)  5 mcg/kg/min Intravenous Continuous 7.8 mL/hr at 07/19/24 1600 5 mcg/kg/min at 07/19/24 1600    furosemide (Lasix) 500 mg in 50 mL infusion (conc: 10 mg/mL)  40 mg/hr Intravenous Continuous 4 mL/hr at 07/19/24 1600 40 mg/hr at 07/19/24 1600     Scheduled Meds:   albuterol  2 puff Inhalation Q6H    allopurinoL  300 mg Oral Daily    amiodarone  400 mg Oral BID    Followed by    [START ON 7/27/2024] amiodarone  200 mg Oral Daily    fluticasone-salmeterol 250-50 mcg/dose  1 puff Inhalation BID    pantoprazole  40 mg Oral Daily    QUEtiapine  50 mg Oral QHS    senna-docusate 8.6-50 mg  1 tablet Oral Daily    warfarin  5 mg Oral Once per day on Sunday Monday Wednesday Friday Saturday     PRN Meds:  Current Facility-Administered Medications:     0.9% NaCl, , Intravenous, PRN    0.9% NaCl, , Intravenous, PRN    acetaminophen, 650 mg, Oral, Q6H PRN    albuterol, 2 puff, Inhalation, Q4H PRN    aluminum & " magnesium hydroxide-simethicone, 30 mL, Oral, Q6H PRN    glycerin adult, 1 suppository, Rectal, Daily PRN    melatonin, 6 mg, Oral, Nightly PRN    sodium chloride 0.9%, 10 mL, Intravenous, PRN    Family History       Problem Relation (Age of Onset)    Arthritis Mother, Maternal Grandmother, Maternal Grandfather    Asthma Mother, Sister, Maternal Grandmother    Breast cancer Paternal Grandmother    Diabetes Maternal Grandmother    Down syndrome Brother    Gout Brother    Hypertension Father, Maternal Grandmother, Maternal Grandfather, Paternal Grandfather          Tobacco Use    Smoking status: Never    Smokeless tobacco: Never   Substance and Sexual Activity    Alcohol use: Yes     Comment: holidays  rare    Drug use: No    Sexual activity: Not Currently     Partners: Female       Review of Systems  Objective:     Vital Signs (Most Recent):  Temp: 98.2 °F (36.8 °C) (07/19/24 1130)  Pulse: 93 (07/19/24 1610)  Resp: (!) 21 (07/19/24 1610)  BP: 105/63 (07/19/24 1610)  SpO2: (!) 94 % (07/19/24 1610) Vital Signs (24h Range):  Temp:  [97.5 °F (36.4 °C)-98.6 °F (37 °C)] 98.2 °F (36.8 °C)  Pulse:  [] 93  Resp:  [10-31] 21  SpO2:  [88 %-100 %] 94 %  BP: ()/(51-78) 105/63     Weight: 99.5 kg (219 lb 5.7 oz)  Body mass index is 36.5 kg/m².       Physical Exam  Constitutional:       Interventions: Nasal cannula in place.   HENT:      Head: Normocephalic and atraumatic.   Cardiovascular:      Rate and Rhythm: Normal rate.   Pulmonary:      Effort: Pulmonary effort is normal. No respiratory distress.      Comments: Comfort flow on at 60 L 64 %  Abdominal:      General: There is distension.   Skin:     General: Skin is warm and dry.   Neurological:      Mental Status: He is alert and oriented to person, place, and time.   Psychiatric:         Behavior: Behavior is cooperative.            Review of Symptoms      Symptom Assessment (ESAS 0-10 Scale)  Pain:  0  Dyspnea:  0  Anxiety:  0  Nausea:  0  Depression:   0  Anorexia:  0  Fatigue:  0  Insomnia:  0  Restlessness:  0  Agitation:  0         Performance Status:  70    Living Arrangements:  Lives alone    Psychosocial/Cultural:   See Palliative Psychosocial Note: No  Pt lives alone, no adult children. Pt's Dad lives in [facility] and pt's mother lives in [place]. Per pt, they are both aware of his medical issues they get along.   **Primary  to Follow**  Palliative Care  Consult: Yes        Advance Care Planning  Advance Directives:   Living Will: No    LaPOST: No    Do Not Resuscitate Status: No    Medical Power of : Yes    Goals of Care: What is most important right now is to focus on extending life as long as possible, even it it means sacrificing quality, curative/life-prolongation (regardless of treatment burdens). Accordingly, we have decided that the best plan to meet the patient's goals includes continuing with treatment.         Significant Labs: All pertinent labs within the past 24 hours have been reviewed.  CBC:   Recent Labs   Lab 07/19/24  0618   WBC 5.52   HGB 12.8*   HCT 46.3   MCV 88   *     BMP:  Recent Labs   Lab 07/19/24  0616   GLU 90      K 3.5   CL 93*   CO2 39*   BUN 44*   CREATININE 1.8*   CALCIUM 9.5   MG 2.2     LFT:  Lab Results   Component Value Date    AST 15 07/19/2024    ALKPHOS 124 07/19/2024    BILITOT 1.2 (H) 07/19/2024     Albumin:   Albumin   Date Value Ref Range Status   07/19/2024 2.6 (L) 3.5 - 5.2 g/dL Final     Protein:   Total Protein   Date Value Ref Range Status   07/19/2024 7.6 6.0 - 8.4 g/dL Final     Lactic acid:   Lab Results   Component Value Date    LACTATE 1.1 07/05/2024    LACTATE 1.1 04/22/2024       Significant Imaging: I have reviewed all pertinent imaging results/findings within the past 24 hours.      CT chest 7/1Impression:     Similar appearance of diffuse ground-glass opacity, suggestive of pulmonary edema.     Dilatation of the main pulmonary artery compatible with  pulmonary hypertension.     Cardiomegaly.   8/24      Soniya Reynolds MD  Palliative Medicine  [facility] - Cardiac Intensive Care

## 2024-07-19 NOTE — PLAN OF CARE
Pulmonology Plan of Care    CT reviewed, final read from radiology pending. Compared to patient's prior CT scans, there appears to be very stable centrilobular ground glass opacities diffusely with subpleural sparing. There are also obvious cystic structures. Overall, similar abnormalities have been present as far back as almost 10 years ago, and have progressed somewhat but VERY slowly. There is no fibrosis noted over this time course.    Overall, this does not fit into any one particular pattern of ILD / diffuse parenchymal lung disease, particularly since he is a nonsmoker as RB-ILD would be closest to what this pattern could be. However, never smoking makes RB-ILD not possible.    Will continue to follow serologic workup. Appreciate rheumatology's assistance in this case. Agree that RA / other CTD are unlikely, however he has obvious parenchymal abnormalities which do not fit a particular pattern in a patient with chronic respiratory failure and pulmonary hypertension.     Pulmonology will continue to follow along.    Ibrahima Galvin M.D.  hospitals Pulmonary & Critical Care Fellow

## 2024-07-19 NOTE — PLAN OF CARE
Advance Care Planning   [facility] - Cardiac Intensive Care  Palliative Care   Psychosocial Assessment    Patient Name: Yong Mcintyre  MRN: [id number]  Admission Date: 7/4/2024  Hospital Length of Stay: 15 days  Code Status: Full Code   Attending Provider: Rossy Leary MD  Palliative Care Provider:   Primary Care Physician: Gianni Escalona MD  Principal Problem:Right ventricular dysfunction    Reason for Referral: assistance with clarification of goals of care  Consult Order Date:   Primary CM/SW:    Present during Interview: patient, past medical records, and ER records.      Primary Language:English   Needed: no      Past Medical Situation:   PMH:   Past Medical History:   Diagnosis Date    Arthritis     CHF (congestive heart failure)     Diastolic dysfunction    Cor pulmonale 11/05/2012    Gallstones     GERD (gastroesophageal reflux disease)     Hypertension     Morbid obesity 11/05/2012    Obesity hypoventilation syndrome     On home oxygen therapy 03/07/2014    MALLIKA (obstructive sleep apnea)     BPAP 16/11 with 3 L at night (continuous O2).    Paroxysmal atrial fibrillation     PFO (patent foramen ovale) 11/05/2012    status post closure    Pickwickian syndrome 11/05/2012    Pulmonary hypertension      Mental Health/Substance Use History:  Risk of Abuse, neglect or exploitation:   Current or Previous Trauma and/or evidence of PTSD:  Non-traditional Health practices:     Understanding of diagnosis and prognosis:  Experience/Comfort level with health care system:    Patients Mental Status:     Socio-Economic Factors/Resources:  Address: [address] [place] [address]  Phone Number: [phone] (home)     Marital Status: Single  Household composition: pt lives alone  Children: 0    Patient/Family perceptions about Caregiving Needs; availability and capacity: pt was unaware of increased care needs prior to our conversation    Family Structure, Dynamics/Relationships:  pt's entire family lives in [place]    Patterns/Styles of Communication and Decision-making in the Family: pt is decisional     Patient/Family Strengths/Resilience: pt has been very independent prior to this hospitalization, even with health issues   Patient/Family Coping: appropriate     Activities of Daily Living: independent prior   Support Systems-Family & Community (Home Health, HME etc): pt on 3L O2 at home     Transportation:  yes    Work/Education History: unemployed  Self-Care Activities/Hobbies:      History: no    Financial Resources:Medicaid      Advanced Care Planning & Legal Concerns:   Advanced Directives/Living Will: no  LaPOST/POLST: no   Planning:  no    Medical Power of : yes     Oral/Written Declaration: yes  Witnesses:   Surrogate Decision Maker:     Emergency Contacts:    Spirituality, Culture & Coping Mechanisms:  F- Estephania and Belief: Synagogue     I - Importance:     C - Community/Culture Values:     A - Address in Care:       Goals/Hopes/Expectations: to go home, 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   Fears/Anxiety/Concerns: dying       Preferences about EOL Environment: (own bed, family nearby, pets, music, etc): TBD    Advance Care Planning     Date: 2024    [facility]  I engaged the patient in a voluntary conversation about advance care planning and we specifically addressed what the goals of care would be moving forward, in light of the patient's change in clinical status, specifically right ventricular dysfunction.  We did specifically address the patient's likely prognosis, which is poor.  We explored the patient's values and preferences for future care.  The patient endorses that what is most important right now is to focus on extending life as long as possible, even it it means sacrificing quality and curative/life-prolongation (regardless of treatment burdens)    Accordingly, we have decided that the best plan to meet the patient's goals includes  "continuing with treatment    A total of 35 min was spent on advance care planning, goals of care discussion, emotional support, formulating and communicating prognosis and exploring burden/benefit of various approaches of treatment. This discussion occurred on a fully voluntary basis with the verbal consent of the patient and/or family.       Discharge Planning Needs/Plan of Care:     SANDRA, along with Dr. Reynolds, met with pt at bedside. Pt presents seated in chair, with AirVo in place, in fair spirits, denying discomfort. Introduced palliative medicine and explained our role in his care. Asked what pt understands about his current condition, and pt states, "they told me some of my numbers are better and some of them are worse." Pt states that he would like to "get in my car in the parking lot and drive home." Explained that we are currently unable to send him home d/t the amount of oxygen he is requiring. Pt initially did not understand, stating that he always uses oxygen at home. Asked pt how many liters he was on at home, and told 3L. Explained that he's currently on 60L, which is double the amount that he was on last week, despite our hope and intention to wean him down. Pt did not immediately understand, but did ultimately understand that his oxygen requirements are much more than they had been prior to hospitalization. Pt states that he didn't realize his condition is as bad as it is. Pt has no support locally, as family is all in [place]. Encouraged pt to talk to his family and see if they can come to support him through this difficult time. Pt states that he is "never giving up," and that his parents would "keep his body alive." Pt currently wishes to remain a full code, though mentioned that he may not want to be put on a ventilator. This will need to be clarified.     Of note, pt has a HCPOA in the chart from 6/24 naming his father as his decision maker, however, today he verbalized that he wants his sister " Paula to be first call and his decision maker, should he not be able to speak for himself.     [facility] for ongoing education and support.    Sybil Godfrey, SANDRA-BACS, ACHP-SW  Department of Palliative Medicine

## 2024-07-19 NOTE — PLAN OF CARE
Problem: Adult Inpatient Plan of Care  Goal: Plan of Care Review  Outcome: Progressing  Goal: Optimal Comfort and Wellbeing  Outcome: Progressing     Problem: Fall Injury Risk  Goal: Absence of Fall and Fall-Related Injury  Outcome: Progressing     Problem: Heart Failure  Goal: Stable Heart Rate and Rhythm  Outcome: Progressing     CICU DAILY GOALS       A: Awake    RASS: Goal - RASS Goal: 0-->alert and calm  Actual - RASS (Carter Agitation-Sedation Scale): alert and calm   Restraint necessity:  N/A.  B: Breath   SBT: Not intubated .    Patient tolerating high flow nasal cannula during daytime maintaining SpO2 >94%.  No c/o shortness of breath at rest or with activities.  C: Coordinate A & B, analgesics/sedatives   Pain: managed    SAT: Not intubated  D: Delirium   CAM-ICU: Overall CAM-ICU: Negative   Lights on, window shades up, and sitting up in recliner watching TV during daytime.  E: Early(intubated/ Progressive (non-intubated) Mobility   MOVE Screen: Fail  Activity: Activity Management: Up in chair - L3, Up to bedside commode - L3, Standing - L3, Sitting at edge of bed - L2, Heel slide - L1, Arm raise - L1, Ankle pumps - L1  FAS: Feeding/Nutrition   Diet order: Diet/Nutrition Received: 2 gram sodium, restrict fluids (1500cc FR),   Fluid restriction: Fluid Requirement: 1500cc FR  No c/o nausea or vomiting.  T: Thrombus  DVT prophylaxis: VTE Required Core Measure: Pharmacological prophylaxis initiated/maintained (Coumadin)  H: HOB Elevation   Head of Bed (HOB) Positioning: HOB at 30-45 degrees  U: Ulcer Prophylaxis   GI: yes.  Pantoprazole  G: Glucose control   Monitored with morning labs.     S: Skin   Bundle compliance: yes    Patient repositions self independently in bed.  Mepilex applied to sacrum and bilateral heels.  Bathing/Skin Care: bath, complete, dressed/undressed, electrode patches/site rotation, linen changed Date: 07/19/2024  B: Bowel Function   no issues.  Last BM 07/19  I: Indwelling  Catheters   Dunne necessity:  N/A.  Patient uses condom catheter during night and urinal during  day.   CVC necessity: Yes.  RIJ TLC   IPAD offered:  N/A  D: De-escalation Antibx   Mupirocin  Plan for the day   Lasix and Dobutamine infusions continued as ordered.  Routine CVP and SvO2 done.  Palliative consult complete.    Family/Goals of care/Code Status   Code Status: Full Code    Intake/Output Summary (Last 24 hours) at 7/19/2024 1837  Last data filed at 7/19/2024 1830  Gross per 24 hour   Intake 1545.92 ml   Output 4850 ml   Net -3304.08 ml        No acute events throughout day, VS and assessment per flow sheet, patient progressing towards goals as tolerated, plan of care reviewed with Yong Mcintyre and family, all concerns addressed, will continue to monitor.

## 2024-07-19 NOTE — SUBJECTIVE & OBJECTIVE
"Interval History: Pt on 60L at 64%. Denies feeling short of breath     Past Medical History:   Diagnosis Date    Arthritis     CHF (congestive heart failure)     Diastolic dysfunction    Cor pulmonale 11/05/2012    Gallstones     GERD (gastroesophageal reflux disease)     Hypertension     Morbid obesity 11/05/2012    Obesity hypoventilation syndrome     On home oxygen therapy 03/07/2014    MALLIKA (obstructive sleep apnea)     BPAP 16/11 with 3 L at night (continuous O2).    Paroxysmal atrial fibrillation     PFO (patent foramen ovale) 11/05/2012    status post closure    Pickwickian syndrome 11/05/2012    Pulmonary hypertension        Past Surgical History:   Procedure Laterality Date    CHOLECYSTECTOMY      RIGHT HEART CATHETERIZATION Right 03/22/2022    Procedure: INSERTION, CATHETER, RIGHT HEART;  Surgeon: Tony Martínez MD;  Location: [facility] CATH LAB;  Service: Cardiology;  Laterality: Right;    RIGHT HEART CATHETERIZATION Right 01/31/2024    Procedure: INSERTION, CATHETER, RIGHT HEART;  Surgeon: Sheri Ritter MD;  Location: [facility] CATH LAB;  Service: Cardiology;  Laterality: Right;    UPPER GASTROINTESTINAL ENDOSCOPY      2-3 years ago       Review of patient's allergies indicates:   Allergen Reactions    Lipitor [atorvastatin] Other (See Comments)     "it makes my legs feel like jelly"  Other reaction(s): causes legs to hurt       Medications:  Continuous Infusions:   DOBUTamine IV infusion (non-titrating)  5 mcg/kg/min Intravenous Continuous 7.8 mL/hr at 07/19/24 1600 5 mcg/kg/min at 07/19/24 1600    furosemide (Lasix) 500 mg in 50 mL infusion (conc: 10 mg/mL)  40 mg/hr Intravenous Continuous 4 mL/hr at 07/19/24 1600 40 mg/hr at 07/19/24 1600     Scheduled Meds:   albuterol  2 puff Inhalation Q6H    allopurinoL  300 mg Oral Daily    amiodarone  400 mg Oral BID    Followed by    [START ON 7/27/2024] amiodarone  200 mg Oral Daily    fluticasone-salmeterol 250-50 mcg/dose  1 puff Inhalation BID    pantoprazole  40 " mg Oral Daily    QUEtiapine  50 mg Oral QHS    senna-docusate 8.6-50 mg  1 tablet Oral Daily    warfarin  5 mg Oral Once per day on Sunday Monday Wednesday Friday Saturday     PRN Meds:  Current Facility-Administered Medications:     0.9% NaCl, , Intravenous, PRN    0.9% NaCl, , Intravenous, PRN    acetaminophen, 650 mg, Oral, Q6H PRN    albuterol, 2 puff, Inhalation, Q4H PRN    aluminum & magnesium hydroxide-simethicone, 30 mL, Oral, Q6H PRN    glycerin adult, 1 suppository, Rectal, Daily PRN    melatonin, 6 mg, Oral, Nightly PRN    sodium chloride 0.9%, 10 mL, Intravenous, PRN    Family History       Problem Relation (Age of Onset)    Arthritis Mother, Maternal Grandmother, Maternal Grandfather    Asthma Mother, Sister, Maternal Grandmother    Breast cancer Paternal Grandmother    Diabetes Maternal Grandmother    Down syndrome Brother    Gout Brother    Hypertension Father, Maternal Grandmother, Maternal Grandfather, Paternal Grandfather          Tobacco Use    Smoking status: Never    Smokeless tobacco: Never   Substance and Sexual Activity    Alcohol use: Yes     Comment: holidays  rare    Drug use: No    Sexual activity: Not Currently     Partners: Female       Review of Systems  Objective:     Vital Signs (Most Recent):  Temp: 98.2 °F (36.8 °C) (07/19/24 1130)  Pulse: 93 (07/19/24 1610)  Resp: (!) 21 (07/19/24 1610)  BP: 105/63 (07/19/24 1610)  SpO2: (!) 94 % (07/19/24 1610) Vital Signs (24h Range):  Temp:  [97.5 °F (36.4 °C)-98.6 °F (37 °C)] 98.2 °F (36.8 °C)  Pulse:  [] 93  Resp:  [10-31] 21  SpO2:  [88 %-100 %] 94 %  BP: ()/(51-78) 105/63     Weight: 99.5 kg (219 lb 5.7 oz)  Body mass index is 36.5 kg/m².       Physical Exam  Constitutional:       Interventions: Nasal cannula in place.   HENT:      Head: Normocephalic and atraumatic.   Cardiovascular:      Rate and Rhythm: Normal rate.   Pulmonary:      Effort: Pulmonary effort is normal. No respiratory distress.      Comments: Comfort flow on  at 60 L 64 %  Abdominal:      General: There is distension.   Skin:     General: Skin is warm and dry.   Neurological:      Mental Status: He is alert and oriented to person, place, and time.   Psychiatric:         Behavior: Behavior is cooperative.            Review of Symptoms      Symptom Assessment (ESAS 0-10 Scale)  Pain:  0  Dyspnea:  0  Anxiety:  0  Nausea:  0  Depression:  0  Anorexia:  0  Fatigue:  0  Insomnia:  0  Restlessness:  0  Agitation:  0         Performance Status:  70    Living Arrangements:  Lives alone    Psychosocial/Cultural:   See Palliative Psychosocial Note: No  Pt lives alone, no adult children. Pt's Dad lives in [facility] and pt's mother lives in [place]. Per pt, they are both aware of his medical issues they get along.   **Primary  to Follow**  Palliative Care  Consult: Yes        Advance Care Planning   Advance Directives:   Living Will: No    LaPOST: No    Do Not Resuscitate Status: No    Medical Power of : Yes    Goals of Care: What is most important right now is to focus on extending life as long as possible, even it it means sacrificing quality, curative/life-prolongation (regardless of treatment burdens). Accordingly, we have decided that the best plan to meet the patient's goals includes continuing with treatment.         Significant Labs: All pertinent labs within the past 24 hours have been reviewed.  CBC:   Recent Labs   Lab 07/19/24  0618   WBC 5.52   HGB 12.8*   HCT 46.3   MCV 88   *     BMP:  Recent Labs   Lab 07/19/24  0616   GLU 90      K 3.5   CL 93*   CO2 39*   BUN 44*   CREATININE 1.8*   CALCIUM 9.5   MG 2.2     LFT:  Lab Results   Component Value Date    AST 15 07/19/2024    ALKPHOS 124 07/19/2024    BILITOT 1.2 (H) 07/19/2024     Albumin:   Albumin   Date Value Ref Range Status   07/19/2024 2.6 (L) 3.5 - 5.2 g/dL Final     Protein:   Total Protein   Date Value Ref Range Status   07/19/2024 7.6 6.0 - 8.4 g/dL Final      Lactic acid:   Lab Results   Component Value Date    LACTATE 1.1 07/05/2024    LACTATE 1.1 04/22/2024       Significant Imaging: I have reviewed all pertinent imaging results/findings within the past 24 hours.      CT chest 7/1Impression:     Similar appearance of diffuse ground-glass opacity, suggestive of pulmonary edema.     Dilatation of the main pulmonary artery compatible with pulmonary hypertension.     Cardiomegaly.   8/24

## 2024-07-20 PROBLEM — I50.9 ACUTE DECOMPENSATED HEART FAILURE: Status: ACTIVE | Noted: 2024-07-20

## 2024-07-20 LAB
ALBUMIN SERPL BCP-MCNC: 2.5 G/DL (ref 3.5–5.2)
ALBUMIN SERPL BCP-MCNC: 2.6 G/DL (ref 3.5–5.2)
ALLENS TEST: ABNORMAL
ALLENS TEST: ABNORMAL
ALP SERPL-CCNC: 128 U/L (ref 55–135)
ALT SERPL W/O P-5'-P-CCNC: 6 U/L (ref 10–44)
ANION GAP SERPL CALC-SCNC: 10 MMOL/L (ref 8–16)
ANION GAP SERPL CALC-SCNC: 7 MMOL/L (ref 8–16)
ANION GAP SERPL CALC-SCNC: 8 MMOL/L (ref 8–16)
ANION GAP SERPL CALC-SCNC: 9 MMOL/L (ref 8–16)
AST SERPL-CCNC: 16 U/L (ref 10–40)
BASOPHILS # BLD AUTO: 0.03 K/UL (ref 0–0.2)
BASOPHILS NFR BLD: 0.5 % (ref 0–1.9)
BILIRUB SERPL-MCNC: 1.2 MG/DL (ref 0.1–1)
BUN SERPL-MCNC: 39 MG/DL (ref 6–20)
BUN SERPL-MCNC: 39 MG/DL (ref 6–20)
BUN SERPL-MCNC: 41 MG/DL (ref 6–20)
BUN SERPL-MCNC: 42 MG/DL (ref 6–20)
CALCIUM SERPL-MCNC: 8.5 MG/DL (ref 8.7–10.5)
CALCIUM SERPL-MCNC: 9.2 MG/DL (ref 8.7–10.5)
CALCIUM SERPL-MCNC: 9.3 MG/DL (ref 8.7–10.5)
CALCIUM SERPL-MCNC: 9.4 MG/DL (ref 8.7–10.5)
CHLORIDE SERPL-SCNC: 93 MMOL/L (ref 95–110)
CHLORIDE SERPL-SCNC: 94 MMOL/L (ref 95–110)
CHLORIDE SERPL-SCNC: 95 MMOL/L (ref 95–110)
CHLORIDE SERPL-SCNC: 99 MMOL/L (ref 95–110)
CO2 SERPL-SCNC: 30 MMOL/L (ref 23–29)
CO2 SERPL-SCNC: 35 MMOL/L (ref 23–29)
CO2 SERPL-SCNC: 36 MMOL/L (ref 23–29)
CO2 SERPL-SCNC: 38 MMOL/L (ref 23–29)
CREAT SERPL-MCNC: 1.7 MG/DL (ref 0.5–1.4)
CREAT SERPL-MCNC: 1.7 MG/DL (ref 0.5–1.4)
CREAT SERPL-MCNC: 1.8 MG/DL (ref 0.5–1.4)
CREAT SERPL-MCNC: 1.8 MG/DL (ref 0.5–1.4)
DELSYS: ABNORMAL
DELSYS: ABNORMAL
DIFFERENTIAL METHOD BLD: ABNORMAL
EOSINOPHIL # BLD AUTO: 0.1 K/UL (ref 0–0.5)
EOSINOPHIL NFR BLD: 1.2 % (ref 0–8)
ERYTHROCYTE [DISTWIDTH] IN BLOOD BY AUTOMATED COUNT: 22.4 % (ref 11.5–14.5)
EST. GFR  (NO RACE VARIABLE): 45.9 ML/MIN/1.73 M^2
EST. GFR  (NO RACE VARIABLE): 45.9 ML/MIN/1.73 M^2
EST. GFR  (NO RACE VARIABLE): 49.1 ML/MIN/1.73 M^2
EST. GFR  (NO RACE VARIABLE): 49.1 ML/MIN/1.73 M^2
FIO2: 65
FIO2: 65
FLOW: 60
FLOW: 60
GLUCOSE SERPL-MCNC: 100 MG/DL (ref 70–110)
GLUCOSE SERPL-MCNC: 112 MG/DL (ref 70–110)
GLUCOSE SERPL-MCNC: 93 MG/DL (ref 70–110)
GLUCOSE SERPL-MCNC: 93 MG/DL (ref 70–110)
HCO3 UR-SCNC: 38.2 MMOL/L (ref 24–28)
HCO3 UR-SCNC: 39.9 MMOL/L (ref 24–28)
HCT VFR BLD AUTO: 45 % (ref 40–54)
HGB BLD-MCNC: 12.6 G/DL (ref 14–18)
IMM GRANULOCYTES # BLD AUTO: 0.01 K/UL (ref 0–0.04)
IMM GRANULOCYTES NFR BLD AUTO: 0.2 % (ref 0–0.5)
INR PPP: 2.5 (ref 0.8–1.2)
LYMPHOCYTES # BLD AUTO: 1.1 K/UL (ref 1–4.8)
LYMPHOCYTES NFR BLD: 18.8 % (ref 18–48)
MAGNESIUM SERPL-MCNC: 1.9 MG/DL (ref 1.6–2.6)
MAGNESIUM SERPL-MCNC: 2.1 MG/DL (ref 1.6–2.6)
MAGNESIUM SERPL-MCNC: 2.5 MG/DL (ref 1.6–2.6)
MCH RBC QN AUTO: 24.4 PG (ref 27–31)
MCHC RBC AUTO-ENTMCNC: 28 G/DL (ref 32–36)
MCV RBC AUTO: 87 FL (ref 82–98)
MODE: ABNORMAL
MODE: ABNORMAL
MONOCYTES # BLD AUTO: 0.5 K/UL (ref 0.3–1)
MONOCYTES NFR BLD: 7.6 % (ref 4–15)
NEUTROPHILS # BLD AUTO: 4.2 K/UL (ref 1.8–7.7)
NEUTROPHILS NFR BLD: 71.7 % (ref 38–73)
NRBC BLD-RTO: 0 /100 WBC
PCO2 BLDA: 75.9 MMHG (ref 35–45)
PCO2 BLDA: 81.1 MMHG (ref 35–45)
PH SMN: 7.3 [PH] (ref 7.35–7.45)
PH SMN: 7.31 [PH] (ref 7.35–7.45)
PHOSPHATE SERPL-MCNC: 3.2 MG/DL (ref 2.7–4.5)
PHOSPHATE SERPL-MCNC: 3.9 MG/DL (ref 2.7–4.5)
PLATELET # BLD AUTO: 151 K/UL (ref 150–450)
PMV BLD AUTO: 11.2 FL (ref 9.2–12.9)
PO2 BLDA: 31 MMHG (ref 40–60)
PO2 BLDA: 42 MMHG (ref 40–60)
POC BE: 12 MMOL/L
POC BE: 13 MMOL/L
POC SATURATED O2: 49 % (ref 95–100)
POC SATURATED O2: 70 % (ref 95–100)
POC TCO2: 41 MMOL/L (ref 24–29)
POC TCO2: 42 MMOL/L (ref 24–29)
POTASSIUM SERPL-SCNC: 2.8 MMOL/L (ref 3.5–5.1)
POTASSIUM SERPL-SCNC: 3.5 MMOL/L (ref 3.5–5.1)
POTASSIUM SERPL-SCNC: 3.5 MMOL/L (ref 3.5–5.1)
POTASSIUM SERPL-SCNC: 3.6 MMOL/L (ref 3.5–5.1)
PROT SERPL-MCNC: 7.6 G/DL (ref 6–8.4)
PROTHROMBIN TIME: 25.4 SEC (ref 9–12.5)
RBC # BLD AUTO: 5.16 M/UL (ref 4.6–6.2)
SAMPLE: ABNORMAL
SAMPLE: ABNORMAL
SITE: ABNORMAL
SITE: ABNORMAL
SODIUM SERPL-SCNC: 137 MMOL/L (ref 136–145)
SODIUM SERPL-SCNC: 138 MMOL/L (ref 136–145)
SODIUM SERPL-SCNC: 139 MMOL/L (ref 136–145)
SODIUM SERPL-SCNC: 140 MMOL/L (ref 136–145)
WBC # BLD AUTO: 5.89 K/UL (ref 3.9–12.7)

## 2024-07-20 PROCEDURE — 27100171 HC OXYGEN HIGH FLOW UP TO 24 HOURS

## 2024-07-20 PROCEDURE — 20000000 HC ICU ROOM

## 2024-07-20 PROCEDURE — 80048 BASIC METABOLIC PNL TOTAL CA: CPT | Mod: 91,XB | Performed by: STUDENT IN AN ORGANIZED HEALTH CARE EDUCATION/TRAINING PROGRAM

## 2024-07-20 PROCEDURE — 84100 ASSAY OF PHOSPHORUS: CPT

## 2024-07-20 PROCEDURE — 80069 RENAL FUNCTION PANEL: CPT | Performed by: STUDENT IN AN ORGANIZED HEALTH CARE EDUCATION/TRAINING PROGRAM

## 2024-07-20 PROCEDURE — 80053 COMPREHEN METABOLIC PANEL: CPT

## 2024-07-20 PROCEDURE — 99900035 HC TECH TIME PER 15 MIN (STAT)

## 2024-07-20 PROCEDURE — 25000003 PHARM REV CODE 250: Performed by: STUDENT IN AN ORGANIZED HEALTH CARE EDUCATION/TRAINING PROGRAM

## 2024-07-20 PROCEDURE — 94640 AIRWAY INHALATION TREATMENT: CPT

## 2024-07-20 PROCEDURE — 83735 ASSAY OF MAGNESIUM: CPT | Mod: 91 | Performed by: STUDENT IN AN ORGANIZED HEALTH CARE EDUCATION/TRAINING PROGRAM

## 2024-07-20 PROCEDURE — 25000242 PHARM REV CODE 250 ALT 637 W/ HCPCS: Performed by: STUDENT IN AN ORGANIZED HEALTH CARE EDUCATION/TRAINING PROGRAM

## 2024-07-20 PROCEDURE — 25000003 PHARM REV CODE 250: Performed by: INTERNAL MEDICINE

## 2024-07-20 PROCEDURE — 63600175 PHARM REV CODE 636 W HCPCS

## 2024-07-20 PROCEDURE — 94799 UNLISTED PULMONARY SVC/PX: CPT

## 2024-07-20 PROCEDURE — 85610 PROTHROMBIN TIME: CPT

## 2024-07-20 PROCEDURE — 63600175 PHARM REV CODE 636 W HCPCS: Performed by: INTERNAL MEDICINE

## 2024-07-20 PROCEDURE — 83735 ASSAY OF MAGNESIUM: CPT | Mod: 91 | Performed by: INTERNAL MEDICINE

## 2024-07-20 PROCEDURE — 83735 ASSAY OF MAGNESIUM: CPT

## 2024-07-20 PROCEDURE — 63600175 PHARM REV CODE 636 W HCPCS: Performed by: STUDENT IN AN ORGANIZED HEALTH CARE EDUCATION/TRAINING PROGRAM

## 2024-07-20 PROCEDURE — 25000003 PHARM REV CODE 250

## 2024-07-20 PROCEDURE — 85025 COMPLETE CBC W/AUTO DIFF WBC: CPT

## 2024-07-20 PROCEDURE — 94761 N-INVAS EAR/PLS OXIMETRY MLT: CPT

## 2024-07-20 PROCEDURE — C1751 CATH, INF, PER/CENT/MIDLINE: HCPCS

## 2024-07-20 PROCEDURE — 94660 CPAP INITIATION&MGMT: CPT

## 2024-07-20 PROCEDURE — 80048 BASIC METABOLIC PNL TOTAL CA: CPT | Mod: XB | Performed by: INTERNAL MEDICINE

## 2024-07-20 PROCEDURE — 99231 SBSQ HOSP IP/OBS SF/LOW 25: CPT | Mod: GC,,, | Performed by: INTERNAL MEDICINE

## 2024-07-20 RX ORDER — POTASSIUM CHLORIDE 29.8 MG/ML
40 INJECTION INTRAVENOUS ONCE
Status: COMPLETED | OUTPATIENT
Start: 2024-07-20 | End: 2024-07-21

## 2024-07-20 RX ORDER — MAGNESIUM SULFATE HEPTAHYDRATE 40 MG/ML
2 INJECTION, SOLUTION INTRAVENOUS ONCE
Status: COMPLETED | OUTPATIENT
Start: 2024-07-20 | End: 2024-07-20

## 2024-07-20 RX ORDER — POTASSIUM CHLORIDE 29.8 MG/ML
40 INJECTION INTRAVENOUS ONCE
Status: COMPLETED | OUTPATIENT
Start: 2024-07-20 | End: 2024-07-20

## 2024-07-20 RX ORDER — POTASSIUM CHLORIDE 20 MEQ/1
40 TABLET, EXTENDED RELEASE ORAL
Status: COMPLETED | OUTPATIENT
Start: 2024-07-20 | End: 2024-07-20

## 2024-07-20 RX ORDER — POTASSIUM CHLORIDE 20 MEQ/1
20 TABLET, EXTENDED RELEASE ORAL ONCE
Status: COMPLETED | OUTPATIENT
Start: 2024-07-20 | End: 2024-07-20

## 2024-07-20 RX ORDER — POTASSIUM CHLORIDE 20 MEQ/1
40 TABLET, EXTENDED RELEASE ORAL ONCE
Status: COMPLETED | OUTPATIENT
Start: 2024-07-20 | End: 2024-07-20

## 2024-07-20 RX ADMIN — PANTOPRAZOLE SODIUM 40 MG: 40 TABLET, DELAYED RELEASE ORAL at 08:07

## 2024-07-20 RX ADMIN — ALBUTEROL SULFATE 2 PUFF: 108 INHALANT RESPIRATORY (INHALATION) at 01:07

## 2024-07-20 RX ADMIN — ALBUTEROL SULFATE 2 PUFF: 108 INHALANT RESPIRATORY (INHALATION) at 07:07

## 2024-07-20 RX ADMIN — POTASSIUM CHLORIDE 40 MEQ: 29.8 INJECTION, SOLUTION INTRAVENOUS at 05:07

## 2024-07-20 RX ADMIN — AMIODARONE HYDROCHLORIDE 400 MG: 200 TABLET ORAL at 08:07

## 2024-07-20 RX ADMIN — ALLOPURINOL 300 MG: 100 TABLET ORAL at 08:07

## 2024-07-20 RX ADMIN — FLUTICASONE PROPIONATE AND SALMETEROL 1 PUFF: 50; 250 POWDER RESPIRATORY (INHALATION) at 08:07

## 2024-07-20 RX ADMIN — WARFARIN SODIUM 5 MG: 5 TABLET ORAL at 06:07

## 2024-07-20 RX ADMIN — SODIUM CHLORIDE: 9 INJECTION, SOLUTION INTRAVENOUS at 05:07

## 2024-07-20 RX ADMIN — FUROSEMIDE 40 MG/HR: 10 INJECTION, SOLUTION INTRAMUSCULAR; INTRAVENOUS at 12:07

## 2024-07-20 RX ADMIN — POTASSIUM CHLORIDE 40 MEQ: 29.8 INJECTION, SOLUTION INTRAVENOUS at 08:07

## 2024-07-20 RX ADMIN — SENNOSIDES AND DOCUSATE SODIUM 1 TABLET: 50; 8.6 TABLET ORAL at 08:07

## 2024-07-20 RX ADMIN — DOBUTAMINE HYDROCHLORIDE 5 MCG/KG/MIN: 400 INJECTION INTRAVENOUS at 04:07

## 2024-07-20 RX ADMIN — POTASSIUM CHLORIDE 40 MEQ: 1500 TABLET, EXTENDED RELEASE ORAL at 01:07

## 2024-07-20 RX ADMIN — ALBUTEROL SULFATE 2 PUFF: 108 INHALANT RESPIRATORY (INHALATION) at 08:07

## 2024-07-20 RX ADMIN — POTASSIUM CHLORIDE 40 MEQ: 1500 TABLET, EXTENDED RELEASE ORAL at 05:07

## 2024-07-20 RX ADMIN — QUETIAPINE FUMARATE 50 MG: 25 TABLET ORAL at 08:07

## 2024-07-20 RX ADMIN — POTASSIUM CHLORIDE 40 MEQ: 1500 TABLET, EXTENDED RELEASE ORAL at 08:07

## 2024-07-20 RX ADMIN — POTASSIUM CHLORIDE 20 MEQ: 1500 TABLET, EXTENDED RELEASE ORAL at 08:07

## 2024-07-20 RX ADMIN — MAGNESIUM SULFATE HEPTAHYDRATE 2 G: 40 INJECTION, SOLUTION INTRAVENOUS at 05:07

## 2024-07-20 NOTE — ASSESSMENT & PLAN NOTE
xOn coumadin.    Lab Results   Component Value Date    INR 2.5 (H) 07/20/2024    INR 2.6 (H) 07/19/2024    INR 2.8 (H) 07/18/2024     -- Goal INR 2-3  -- Titrate warfarin daily    -- EP consulted for Aflutter, cardioverted 7/16

## 2024-07-20 NOTE — ASSESSMENT & PLAN NOTE
WHO group 2-3 per his managing pulmonologist (Danyell at [facility])  Adherent with diet, CPAP, and on continuous O2 3L at home.     -- RV dysfunction treatment as above  -- Pulm consulted, recs appreciated   -- Concern for RA per pulm, rheum consulted

## 2024-07-20 NOTE — PROGRESS NOTES
[facility] - Cardiac Intensive Care  Cardiology  Progress Note    Patient Name: Yong Mcintyre  MRN: [id number]  Admission Date: 7/4/2024  Hospital Length of Stay: 16 days  Code Status: Full Code   Attending Physician: Rossy Leary MD   Primary Care Physician: Gianni Escalona MD  Expected Discharge Date: 7/25/2024  Principal Problem:Right ventricular dysfunction    Subjective:     Hospital Course:   The patient was admitted to the CCU for further management of right-sided heart failure. He was diuresed with a lasix gtt. Electrolytes were monitored and repleted as indicated. Palliative care was consulted given his poor prognosis. The patient had significant O2 requirements on admission which were slow to wean. A central line was placed for close CVP monitoring in the setting of aggressive diuresis. Nutrition consult placed for low salt diet education. Acetazolamide was added to his diuretic regimen due to persistent metabolic alkalosis. The patient required intermittent Bipap due to hypercapnic respiratory failure. Dobutamine was added to augment cardiac output to further diurese patient. The patient was in aflutter, EP was consulted. S/p cardioversion 7/16/24 with successful conversion to sinus rhythm. Pulm consulted for hypercapnia. Per pulm ok to remain on HFNC. Presentation concerning for end stage RA causing pum HTN, rheum consulted and recommendations appreciated. No therapies available, but rheum has ordered work up. Switched from IV to PO amio. Continued diamox. Patient to consider Palliative care. Palliative medicine consulted and patient expresses wishes for sister to be a part of discussions.     Interval History: No overnight events. Still requiring HF oxygen and intermittent BIPAP. Was seen by palliative medicine team and patient wishing for sister to be a part of discussions. Unable to get into contact with sister today. Will continue [facility] discussions.    ROS  Objective:     Vital Signs (Most  Recent):  Temp: 98.3 °F (36.8 °C) (07/20/24 1100)  Pulse: 91 (07/20/24 0714)  Resp: (!) 24 (07/20/24 0714)  BP: 103/66 (07/20/24 0700)  SpO2: 96 % (07/20/24 0714) Vital Signs (24h Range):  Temp:  [98 °F (36.7 °C)-98.5 °F (36.9 °C)] 98.3 °F (36.8 °C)  Pulse:  [83-98] 91  Resp:  [10-34] 24  SpO2:  [86 %-100 %] 96 %  BP: ()/(50-78) 103/66     Weight: 97.5 kg (214 lb 15.2 oz)  Body mass index is 35.77 kg/m².     SpO2: 96 %         Intake/Output Summary (Last 24 hours) at 7/20/2024 1254  Last data filed at 7/20/2024 0900  Gross per 24 hour   Intake 1041.03 ml   Output 2685 ml   Net -1643.97 ml       Lines/Drains/Airways       Central Venous Catheter Line  Duration             Percutaneous Central Line - Triple Lumen  07/08/24 1145 Internal Jugular Right 12 days              Peripheral Intravenous Line  Duration                  Peripheral IV - Single Lumen 07/16/24 1900 18 G Anterior;Right Forearm 3 days                       Physical Exam  Vitals reviewed.   Constitutional:       General: He is not in acute distress.     Appearance: He is obese. He is ill-appearing.   Cardiovascular:      Rate and Rhythm: Normal rate and regular rhythm.   Pulmonary:      Effort: No respiratory distress.      Breath sounds: No rhonchi.   Abdominal:      General: Abdomen is flat.   Musculoskeletal:      Right lower leg: No edema.      Left lower leg: No edema.   Skin:     General: Skin is warm.   Neurological:      Mental Status: He is alert.          Significant Labs:   Recent Labs   Lab 07/18/24  0211 07/19/24  0618 07/20/24  0415   WBC 5.37 5.52 5.89   HGB 12.7* 12.8* 12.6*   HCT 46.9 46.3 45.0   * 134* 151       Recent Labs   Lab 07/17/24  0305 07/17/24  1218 07/18/24  0211 07/18/24  2355 07/19/24  0616      < > 138 138 139   K 3.3*   < > 3.2* 3.0* 3.5   CL 93*   < > 92* 93* 93*   CO2 37*   < > 38* 36* 39*   BUN 51*   < > 49* 46* 44*   CREATININE 1.9*   < > 2.0* 1.7* 1.8*   CALCIUM 9.7   < > 9.4 9.2 9.5   PHOS 4.5   --  4.4  --  4.0        Recent Labs   Lab 07/18/24  0211 07/19/24  0616 07/20/24  0415   ALKPHOS 141* 124 128   BILITOT 0.9 1.2* 1.2*   PROT 7.6 7.6 7.6   ALT 8* 8* 6*   AST 16 15 16     Assessment and Plan:       * Right ventricular dysfunction  Acute on chronic. - NYHA class III symptoms with recurrent admissions.  - He was seen by [facility] outpatient 6/2024 who state patient was feeling better on new diuretic regimen however worried about the frequency of use of metolazone and his worsening kidney function.    - Admitted with CXR with cardiomegaly and pulmonary vascular congestion, suggestive of pulmonary edema secondary to CHF. BNP elevated at 800. Creatinine worse at 3.1 on admission.  - Given his recurrent HF admissions and most recent hemodynamics, [facility] believes his overall prognosis is poor and recommended palliative care consult. Palliative care evaluated patient. Patient wishes to continue full measures at this time.     - 2 gram sodium restriction and 1500cc fluid restriction.  - Encourage physical activity with graded exercise program.  - Continue central line for CVP monitoring  - Continue Acetazolamide PO  - lasix gtt   - Continue dobutamine gtt  - amio PO  - Diuril as needed  - Monitor lytes BID; replete as indicated    Hypoventilation syndrome  BIPAP QHS; NC during meals. Avoid HFNC.  Significant hypercapnia w/ pCO2 80-90; will start daytime BiPap    - BIPAP for ventilation preferred over high O2      Atypical atrial flutter  - New onset  - On warfarin for Afib  - Patient on warfarin but has been subtherapeutic, will need to assess risk of cardioversion vs benefit to avoid HF in a patient with HF and tachycardia    - Continue AC as per Afib  - Started Amiodarone for rhythm control  - Monitor electrolytes  -continued in Aflutter consulted EP, s/p DCCV/DAWIT on 7/16      Metabolic alkalosis  Bicarb 40's in setting of aggressive diuresis. Suspect contraction alkalosis.    - Continue diamox  - Trend  Bicarb    Acute on chronic respiratory failure with hypoxia  Patient with Hypercapnic and Hypoxic Respiratory failure which is Acute on chronic.  he is on home oxygen at 3 LPM. Supplemental oxygen was provided and noted- Oxygen Concentration (%):  [60-65] 65    Signs/symptoms of respiratory failure include- tachypnea and increased work of breathing. Contributing diagnoses includes - CHF Labs and images were reviewed. Patient Has recent ABG, which has been reviewed.    -- Trend VBG BID  -- BiPap during night and naps; okay for HFNC throughout the day per pulm  -- Goal SpO2 > 88%    Acute on chronic right-sided congestive heart failure  - See 'RV dysfunction'    Paroxysmal A-fib  xOn coumadin.    Lab Results   Component Value Date    INR 2.5 (H) 07/20/2024    INR 2.6 (H) 07/19/2024    INR 2.8 (H) 07/18/2024     -- Goal INR 2-3  -- Titrate warfarin daily    -- EP consulted for Aflutter, cardioverted 7/16    Palliative care encounter  - patient not amenable to palliative options   - pt asked primary team to speak to sister about current medical state and hospice.     MALLIKA (obstructive sleep apnea)  See 'hypoventilation syndrome'    Pulmonary hypertension  WHO group 2-3 per his managing pulmonologist (Danyell at [facility])  Adherent with diet, CPAP, and on continuous O2 3L at home.     -- RV dysfunction treatment as above  -- Pulm consulted, recs appreciated   -- Concern for RA per pulm, rheum consulted        VTE Risk Mitigation (From admission, onward)           Ordered     warfarin (COUMADIN) tablet 5 mg  Once per day on Sunday Monday Wednesday Friday Saturday 07/19/24 0830     IP VTE HIGH RISK PATIENT  Once         07/04/24 2302     Place sequential compression device  Until discontinued         07/04/24 2302                    Josehp Gonzalez,   Cardiology  [facility] - Cardiac Intensive Care

## 2024-07-20 NOTE — ASSESSMENT & PLAN NOTE
Acute on chronic. - NYHA class III symptoms with recurrent admissions.  - He was seen by [facility] outpatient 6/2024 who state patient was feeling better on new diuretic regimen however worried about the frequency of use of metolazone and his worsening kidney function.    - Admitted with CXR with cardiomegaly and pulmonary vascular congestion, suggestive of pulmonary edema secondary to CHF. BNP elevated at 800. Creatinine worse at 3.1 on admission.  - Given his recurrent HF admissions and most recent hemodynamics, [facility] believes his overall prognosis is poor and recommended palliative care consult. Palliative care evaluated patient. Patient wishes to continue full measures at this time.     - 2 gram sodium restriction and 1500cc fluid restriction.  - Encourage physical activity with graded exercise program.  - Continue central line for CVP monitoring  - Continue Acetazolamide PO  - lasix gtt   - Continue dobutamine gtt  - amio PO  - Diuril as needed  - Monitor lytes BID; replete as indicated

## 2024-07-20 NOTE — PLAN OF CARE
CICU DAILY GOALS       A: Awake    RASS: Goal - RASS Goal: 0-->alert and calm  Actual - RASS (Carter Agitation-Sedation Scale): alert and calm   Restraint necessity:    B: Breath   SBT: Not intubated   C: Coordinate A & B, analgesics/sedatives   Pain: managed    SAT: Not intubated  D: Delirium   CAM-ICU: Overall CAM-ICU: Negative  E: Early(intubated/ Progressive (non-intubated) Mobility   MOVE Screen: Pass   Activity: Activity Management: Up to bedside commode - L3  FAS: Feeding/Nutrition   Diet order: Diet/Nutrition Received: low saturated fat/low cholesterol, 2 gram sodium, restrict fluids,   Fluid restriction: Fluid Requirement: 1500fr     T: Thrombus   DVT prophylaxis: VTE Required Core Measure: Pharmacological prophylaxis initiated/maintained  H: HOB Elevation   Head of Bed (HOB) Positioning: HOB at 30-45 degrees  U: Ulcer Prophylaxis   GI: yes  G: Glucose control   managed      S: Skin   Nurses Note -- 4 Eyes  Skin assessed during: Q Shift Change   CHOOSE ONE:  [x] No Altered Skin Integrity Present      [x]Prevention Measures Documented    [] Yes- Altered Skin Integrity Present or Discovered     [] LDA Added if Not in Epic (Describe Wound)     [] New Altered Skin Integrity was Present on Admit and Documented in LDA     [] Wound Image Taken YES  Wound Care Consulted? yes  Attending Nurse:  Hussain Natarajan RN/Staff Member:  Kathrine MOREL    B: Bowel Function   no issues   I: Indwelling Catheters   [place] necessity:     CVC necessity: Yes   IPAD offered: No  D: De-escalation Antibx   No  Plan for the day   Wean O2  Family/Goals of care/Code Status   Code Status: Full Code     No acute events throughout day, VS and assessment per flow sheet, patient progressing towards goals as tolerated, plan of care reviewed with Yong Mcintyre and family, all concerns addressed, will continue to monitor. CVP 19/16/18. Cardiology Fellow notified and Ok with CVP if pt is net negative, which he was . No additional diuretics over  night. Lasix gtt at 40 mg/hr, Dobutamine 5 mcg.

## 2024-07-20 NOTE — PROGRESS NOTES
Pt seen for wound care follow up- right lower leg. Pt is awake, alert, and agrees to wound assessment. Blister has dried/now a scab. Pt reports no pain in this area. Wound is healing; may leave open to air or may apply a moisturizer to scab.      Katja Flores RN, CWON  [facility] Hw Wound/Ostomy  7/19/24 07/19/24 1805   WOCN Assessment   WOCN Total Time (mins) 30   Visit Date 07/19/24   Visit Time 1805   Consult Type Follow Up   WOCN Speciality Wound   Wound other  (blister)   Number of Wounds 1   Intervention assessed;chart review;coordination of care        Wound 07/04/24 1915 Blister(s) Right anterior;lower Leg   Date First Assessed/Time First Assessed: 07/04/24 1915   Present on Original Admission: Yes  Primary Wound Type: Blister(s)  Side: Right  Orientation: anterior;lower  Location: Leg   Wound Image    Dressing Appearance Open to air   Drainage Amount None   Drainage Characteristics/Odor No odor   Appearance Tan;Eschar;Dry;Epithelialization;Other (see comments)  (dried tan/dark maroon scab)

## 2024-07-20 NOTE — Clinical Note
"Weight: 97.5 kg (214 lb 15.2 oz)  BSA (Calculated - sq m): 2.2 sq meters    MAP (mmHg): 65     CVP (mean): 18 mmHg       SpO2: 96 %  Recent Labs     07/19/24 2014 07/20/24 0415   POCSATURATED 54  --    PO2 32*  --    HGB  --  12.6*     ---------------------------  Recent Labs     07/20/24 0415   WBC 5.89   HGB 12.6*   HCT 45.0      ALT 6*   CREATININE 1.7*     ---------------------------  Recent Labs     07/20/24 0415      K 2.8*   MG 1.9       No intake/output data recorded.      Intake/Output Summary (Last 24 hours) at 7/20/2024 0828  Last data filed at 7/20/2024 0600  Gross per 24 hour   Intake 903.22 ml   Output 3235 ml   Net -2331.78 ml     vvvvvvvvvvvvvvvvvvvvvvvvvvvvvvvvvvvvvvvvvvvvvvvvv  Recent Labs     07/19/24 2014   POCSATURATED 54   PO2 32*     SpO2: 96 %    Oxygen Concentration (%): 65             Resp Rate Total: 15 br/min          IBW/VT Calculations  Height: 5' 5" (165.1 cm)  IBW/kg (Calculated) Male: 61.5 kg  Low Range Vt 4cc/kg MALE: 246 mL  Low Range Vt 6cc/kg MALE: 369 mL  Adult Moderate Range Vt 8cc/kg MA: 492 mL  Adult High Range Vt 10cc/kg MALE: 615 mL    Recent Labs     07/19/24 2014 07/20/24 0415   PH 7.351  --    PCO2 77.8*  --    HCO3 43.0*  --    CO2  --  38*   BE 17*  --    CREATININE  --  1.7*     Recent Labs     07/20/24 0415      CO2 38*   CL 93*       No results for input(s): "LACTATE" in the last 72 hours.      "

## 2024-07-20 NOTE — SUBJECTIVE & OBJECTIVE
Interval History: No overnight events. Still requiring HF oxygen and intermittent BIPAP. Was seen by palliative medicine team and patient wishing for sister to be a part of discussions. Unable to get into contact with sister today. Will continue [facility] discussions.    ROS  Objective:     Vital Signs (Most Recent):  Temp: 98.3 °F (36.8 °C) (07/20/24 1100)  Pulse: 91 (07/20/24 0714)  Resp: (!) 24 (07/20/24 0714)  BP: 103/66 (07/20/24 0700)  SpO2: 96 % (07/20/24 0714) Vital Signs (24h Range):  Temp:  [98 °F (36.7 °C)-98.5 °F (36.9 °C)] 98.3 °F (36.8 °C)  Pulse:  [83-98] 91  Resp:  [10-34] 24  SpO2:  [86 %-100 %] 96 %  BP: ()/(50-78) 103/66     Weight: 97.5 kg (214 lb 15.2 oz)  Body mass index is 35.77 kg/m².     SpO2: 96 %         Intake/Output Summary (Last 24 hours) at 7/20/2024 1254  Last data filed at 7/20/2024 0900  Gross per 24 hour   Intake 1041.03 ml   Output 2685 ml   Net -1643.97 ml       Lines/Drains/Airways       Central Venous Catheter Line  Duration             Percutaneous Central Line - Triple Lumen  07/08/24 1145 Internal Jugular Right 12 days              Peripheral Intravenous Line  Duration                  Peripheral IV - Single Lumen 07/16/24 1900 18 G Anterior;Right Forearm 3 days                       Physical Exam  Vitals reviewed.   Constitutional:       General: He is not in acute distress.     Appearance: He is obese. He is ill-appearing.   Cardiovascular:      Rate and Rhythm: Normal rate and regular rhythm.   Pulmonary:      Effort: No respiratory distress.      Breath sounds: No rhonchi.   Abdominal:      General: Abdomen is flat.   Musculoskeletal:      Right lower leg: No edema.      Left lower leg: No edema.   Skin:     General: Skin is warm.   Neurological:      Mental Status: He is alert.          Significant Labs:   Recent Labs   Lab 07/18/24  0211 07/19/24  0618 07/20/24  0415   WBC 5.37 5.52 5.89   HGB 12.7* 12.8* 12.6*   HCT 46.9 46.3 45.0   * 134* 151       Recent Labs    Lab 07/17/24  0305 07/17/24  1218 07/18/24  0211 07/18/24  2355 07/19/24  0616      < > 138 138 139   K 3.3*   < > 3.2* 3.0* 3.5   CL 93*   < > 92* 93* 93*   CO2 37*   < > 38* 36* 39*   BUN 51*   < > 49* 46* 44*   CREATININE 1.9*   < > 2.0* 1.7* 1.8*   CALCIUM 9.7   < > 9.4 9.2 9.5   PHOS 4.5  --  4.4  --  4.0        Recent Labs   Lab 07/18/24  0211 07/19/24  0616 07/20/24  0415   ALKPHOS 141* 124 128   BILITOT 0.9 1.2* 1.2*   PROT 7.6 7.6 7.6   ALT 8* 8* 6*   AST 16 15 16

## 2024-07-20 NOTE — ASSESSMENT & PLAN NOTE
Patient with Hypercapnic and Hypoxic Respiratory failure which is Acute on chronic.  he is on home oxygen at 3 LPM. Supplemental oxygen was provided and noted- Oxygen Concentration (%):  [60-65] 65    Signs/symptoms of respiratory failure include- tachypnea and increased work of breathing. Contributing diagnoses includes - CHF Labs and images were reviewed. Patient Has recent ABG, which has been reviewed.    -- Trend VBG BID  -- BiPap during night and naps; okay for HFNC throughout the day per pulm  -- Goal SpO2 > 88%

## 2024-07-21 LAB
ALBUMIN SERPL BCP-MCNC: 2.6 G/DL (ref 3.5–5.2)
ALLENS TEST: ABNORMAL
ALLENS TEST: ABNORMAL
ALP SERPL-CCNC: 125 U/L (ref 55–135)
ALT SERPL W/O P-5'-P-CCNC: 8 U/L (ref 10–44)
ANION GAP SERPL CALC-SCNC: 9 MMOL/L (ref 8–16)
AST SERPL-CCNC: 15 U/L (ref 10–40)
BASOPHILS # BLD AUTO: 0.04 K/UL (ref 0–0.2)
BASOPHILS NFR BLD: 0.7 % (ref 0–1.9)
BILIRUB SERPL-MCNC: 1 MG/DL (ref 0.1–1)
BUN SERPL-MCNC: 44 MG/DL (ref 6–20)
CALCIUM SERPL-MCNC: 9.5 MG/DL (ref 8.7–10.5)
CHLORIDE SERPL-SCNC: 97 MMOL/L (ref 95–110)
CO2 SERPL-SCNC: 34 MMOL/L (ref 23–29)
CREAT SERPL-MCNC: 1.9 MG/DL (ref 0.5–1.4)
DELSYS: ABNORMAL
DELSYS: ABNORMAL
DIFFERENTIAL METHOD BLD: ABNORMAL
EOSINOPHIL # BLD AUTO: 0.1 K/UL (ref 0–0.5)
EOSINOPHIL NFR BLD: 1.6 % (ref 0–8)
ERYTHROCYTE [DISTWIDTH] IN BLOOD BY AUTOMATED COUNT: 22.2 % (ref 11.5–14.5)
EST. GFR  (NO RACE VARIABLE): 43 ML/MIN/1.73 M^2
FIO2: 50
FIO2: 60
FLOW: 40
FLOW: 60
GLUCOSE SERPL-MCNC: 88 MG/DL (ref 70–110)
HCO3 UR-SCNC: 40.1 MMOL/L (ref 24–28)
HCT VFR BLD AUTO: 45.2 % (ref 40–54)
HGB BLD-MCNC: 12.8 G/DL (ref 14–18)
IMM GRANULOCYTES # BLD AUTO: 0.01 K/UL (ref 0–0.04)
IMM GRANULOCYTES NFR BLD AUTO: 0.2 % (ref 0–0.5)
INR PPP: 2.1 (ref 0.8–1.2)
LYMPHOCYTES # BLD AUTO: 1.2 K/UL (ref 1–4.8)
LYMPHOCYTES NFR BLD: 21.1 % (ref 18–48)
MAGNESIUM SERPL-MCNC: 2.2 MG/DL (ref 1.6–2.6)
MCH RBC QN AUTO: 24.8 PG (ref 27–31)
MCHC RBC AUTO-ENTMCNC: 28.3 G/DL (ref 32–36)
MCV RBC AUTO: 87 FL (ref 82–98)
MODE: ABNORMAL
MODE: ABNORMAL
MONOCYTES # BLD AUTO: 0.4 K/UL (ref 0.3–1)
MONOCYTES NFR BLD: 7.1 % (ref 4–15)
NEUTROPHILS # BLD AUTO: 3.9 K/UL (ref 1.8–7.7)
NEUTROPHILS NFR BLD: 69.3 % (ref 38–73)
NRBC BLD-RTO: 0 /100 WBC
PCO2 BLDA: 75.2 MMHG (ref 35–45)
PH SMN: 7.33 [PH] (ref 7.35–7.45)
PHOSPHATE SERPL-MCNC: 4.1 MG/DL (ref 2.7–4.5)
PLATELET # BLD AUTO: 157 K/UL (ref 150–450)
PMV BLD AUTO: 10.7 FL (ref 9.2–12.9)
PO2 BLDA: 32 MMHG (ref 40–60)
PO2 BLDA: 32 MMHG (ref 40–60)
POC BE: 14 MMOL/L
POC SATURATED O2: 53 % (ref 95–100)
POC SATURATED O2: 53 % (ref 95–100)
POC TCO2: 42 MMOL/L (ref 24–29)
POTASSIUM SERPL-SCNC: 3.9 MMOL/L (ref 3.5–5.1)
PROT SERPL-MCNC: 7.7 G/DL (ref 6–8.4)
PROTHROMBIN TIME: 22 SEC (ref 9–12.5)
RBC # BLD AUTO: 5.17 M/UL (ref 4.6–6.2)
SAMPLE: ABNORMAL
SAMPLE: ABNORMAL
SITE: ABNORMAL
SITE: ABNORMAL
SODIUM SERPL-SCNC: 140 MMOL/L (ref 136–145)
SP02: 100
WBC # BLD AUTO: 5.65 K/UL (ref 3.9–12.7)

## 2024-07-21 PROCEDURE — 63600175 PHARM REV CODE 636 W HCPCS

## 2024-07-21 PROCEDURE — 94660 CPAP INITIATION&MGMT: CPT

## 2024-07-21 PROCEDURE — 63600175 PHARM REV CODE 636 W HCPCS: Performed by: INTERNAL MEDICINE

## 2024-07-21 PROCEDURE — C1751 CATH, INF, PER/CENT/MIDLINE: HCPCS

## 2024-07-21 PROCEDURE — 63600175 PHARM REV CODE 636 W HCPCS: Performed by: STUDENT IN AN ORGANIZED HEALTH CARE EDUCATION/TRAINING PROGRAM

## 2024-07-21 PROCEDURE — 27100171 HC OXYGEN HIGH FLOW UP TO 24 HOURS

## 2024-07-21 PROCEDURE — 94799 UNLISTED PULMONARY SVC/PX: CPT

## 2024-07-21 PROCEDURE — 25000003 PHARM REV CODE 250: Performed by: STUDENT IN AN ORGANIZED HEALTH CARE EDUCATION/TRAINING PROGRAM

## 2024-07-21 PROCEDURE — 94761 N-INVAS EAR/PLS OXIMETRY MLT: CPT

## 2024-07-21 PROCEDURE — 99900035 HC TECH TIME PER 15 MIN (STAT)

## 2024-07-21 PROCEDURE — 83735 ASSAY OF MAGNESIUM: CPT

## 2024-07-21 PROCEDURE — 84100 ASSAY OF PHOSPHORUS: CPT

## 2024-07-21 PROCEDURE — 25000003 PHARM REV CODE 250

## 2024-07-21 PROCEDURE — 80053 COMPREHEN METABOLIC PANEL: CPT

## 2024-07-21 PROCEDURE — 82803 BLOOD GASES ANY COMBINATION: CPT

## 2024-07-21 PROCEDURE — 94640 AIRWAY INHALATION TREATMENT: CPT

## 2024-07-21 PROCEDURE — 85025 COMPLETE CBC W/AUTO DIFF WBC: CPT

## 2024-07-21 PROCEDURE — 99231 SBSQ HOSP IP/OBS SF/LOW 25: CPT | Mod: GC,,, | Performed by: INTERNAL MEDICINE

## 2024-07-21 PROCEDURE — 20000000 HC ICU ROOM

## 2024-07-21 PROCEDURE — 25000003 PHARM REV CODE 250: Performed by: INTERNAL MEDICINE

## 2024-07-21 PROCEDURE — 85610 PROTHROMBIN TIME: CPT

## 2024-07-21 RX ORDER — POTASSIUM CHLORIDE 20 MEQ/1
20 TABLET, EXTENDED RELEASE ORAL ONCE
Status: COMPLETED | OUTPATIENT
Start: 2024-07-21 | End: 2024-07-21

## 2024-07-21 RX ADMIN — QUETIAPINE FUMARATE 50 MG: 25 TABLET ORAL at 09:07

## 2024-07-21 RX ADMIN — FUROSEMIDE 40 MG/HR: 10 INJECTION, SOLUTION INTRAMUSCULAR; INTRAVENOUS at 04:07

## 2024-07-21 RX ADMIN — DOBUTAMINE HYDROCHLORIDE 2.5 MCG/KG/MIN: 400 INJECTION INTRAVENOUS at 09:07

## 2024-07-21 RX ADMIN — CHLOROTHIAZIDE SODIUM 250 MG: 500 INJECTION, POWDER, LYOPHILIZED, FOR SOLUTION INTRAVENOUS at 08:07

## 2024-07-21 RX ADMIN — DEXTROSE 500 MG: 50 INJECTION, SOLUTION INTRAVENOUS at 12:07

## 2024-07-21 RX ADMIN — ALBUTEROL SULFATE 2 PUFF: 108 INHALANT RESPIRATORY (INHALATION) at 01:07

## 2024-07-21 RX ADMIN — AMIODARONE HYDROCHLORIDE 400 MG: 200 TABLET ORAL at 08:07

## 2024-07-21 RX ADMIN — ALBUTEROL SULFATE 2 PUFF: 108 INHALANT RESPIRATORY (INHALATION) at 08:07

## 2024-07-21 RX ADMIN — FLUTICASONE PROPIONATE AND SALMETEROL 1 PUFF: 50; 250 POWDER RESPIRATORY (INHALATION) at 08:07

## 2024-07-21 RX ADMIN — FUROSEMIDE 40 MG/HR: 10 INJECTION, SOLUTION INTRAMUSCULAR; INTRAVENOUS at 01:07

## 2024-07-21 RX ADMIN — ALLOPURINOL 300 MG: 100 TABLET ORAL at 08:07

## 2024-07-21 RX ADMIN — ACETAMINOPHEN 650 MG: 325 TABLET ORAL at 08:07

## 2024-07-21 RX ADMIN — WARFARIN SODIUM 5 MG: 5 TABLET ORAL at 06:07

## 2024-07-21 RX ADMIN — POTASSIUM CHLORIDE 20 MEQ: 1500 TABLET, EXTENDED RELEASE ORAL at 08:07

## 2024-07-21 RX ADMIN — FUROSEMIDE 40 MG/HR: 10 INJECTION, SOLUTION INTRAMUSCULAR; INTRAVENOUS at 12:07

## 2024-07-21 RX ADMIN — SENNOSIDES AND DOCUSATE SODIUM 1 TABLET: 50; 8.6 TABLET ORAL at 08:07

## 2024-07-21 RX ADMIN — PANTOPRAZOLE SODIUM 40 MG: 40 TABLET, DELAYED RELEASE ORAL at 08:07

## 2024-07-21 RX ADMIN — DOBUTAMINE HYDROCHLORIDE 5 MCG/KG/MIN: 400 INJECTION INTRAVENOUS at 12:07

## 2024-07-21 RX ADMIN — MELATONIN TAB 3 MG 6 MG: 3 TAB at 09:07

## 2024-07-21 NOTE — SUBJECTIVE & OBJECTIVE
Interval History: Patient seen upright in the chair with better saturations. Still on 60L and 60% FiO2. No new complaints today.      Objective:     Vital Signs (Most Recent):  Temp: 98.1 °F (36.7 °C) (07/20/24 1500)  Pulse: 91 (07/20/24 2042)  Resp: 20 (07/20/24 2042)  BP: (!) 91/54 (07/20/24 1800)  SpO2: 100 % (07/20/24 2042) Vital Signs (24h Range):  Temp:  [98 °F (36.7 °C)-98.5 °F (36.9 °C)] 98.1 °F (36.7 °C)  Pulse:  [83-96] 91  Resp:  [10-34] 20  SpO2:  [86 %-100 %] 100 %  BP: ()/(50-68) 91/54     Weight: 97.5 kg (214 lb 15.2 oz)  Body mass index is 35.77 kg/m².      Intake/Output Summary (Last 24 hours) at 7/20/2024 2125  Last data filed at 7/20/2024 1800  Gross per 24 hour   Intake 1292.46 ml   Output 1785 ml   Net -492.54 ml        Physical Exam  Vitals and nursing note reviewed.   Constitutional:       General: He is not in acute distress.     Appearance: He is not toxic-appearing.      Interventions: Nasal cannula in place.   HENT:      Head: Normocephalic and atraumatic.      Nose: Congestion present.      Mouth/Throat:      Mouth: Mucous membranes are moist.   Eyes:      General: No scleral icterus.     Pupils: Pupils are equal, round, and reactive to light.   Neck:      Comments: Central line in place, dressing clean and intact  Cardiovascular:      Rate and Rhythm: Tachycardia present. Rhythm irregular.      Heart sounds: Normal heart sounds.   Pulmonary:      Effort: No respiratory distress.      Breath sounds: Rales present. No wheezing.      Comments: Bibasilar crackles.  Abdominal:      Palpations: Abdomen is soft.      Tenderness: There is no abdominal tenderness. There is no guarding.   Musculoskeletal:      Right lower leg: Edema present.      Left lower leg: Edema present.   Skin:     General: Skin is warm and dry.   Neurological:      Mental Status: He is alert and oriented to person, place, and time.   Psychiatric:         Mood and Affect: Mood normal.         Behavior: Behavior  normal. Behavior is cooperative.           Review of Systems    Vents:  Oxygen Concentration (%): 60 (07/20/24 2042)    Lines/Drains/Airways       Central Venous Catheter Line  Duration             Percutaneous Central Line - Triple Lumen  07/08/24 1145 Internal Jugular Right 12 days              Peripheral Intravenous Line  Duration                  Peripheral IV - Single Lumen 07/20/24 2000 18 G Anterior;Proximal;Right Forearm <1 day                    Significant Labs:    CBC/Anemia Profile:  Recent Labs   Lab 07/19/24  0618 07/20/24  0415   WBC 5.52 5.89   HGB 12.8* 12.6*   HCT 46.3 45.0   * 151   MCV 88 87   RDW 22.1* 22.4*        Chemistries:  Recent Labs   Lab 07/19/24  0616 07/20/24  0415 07/20/24  0917 07/20/24  1138 07/20/24  1810    138 140 137 139   K 3.5 2.8* 3.5 3.6 3.5   CL 93* 93* 95 94* 99   CO2 39* 38* 36* 35* 30*   BUN 44* 41* 39* 42* 39*   CREATININE 1.8* 1.7* 1.8* 1.8* 1.7*   CALCIUM 9.5 9.3 9.4 9.2 8.5*   ALBUMIN 2.6* 2.6*  --   --  2.5*   PROT 7.6 7.6  --   --   --    BILITOT 1.2* 1.2*  --   --   --    ALKPHOS 124 128  --   --   --    ALT 8* 6*  --   --   --    AST 15 16  --   --   --    MG 2.2 1.9  --  2.5 2.1   PHOS 4.0 3.9  --   --  3.2       All pertinent labs within the past 24 hours have been reviewed.    Significant Imaging:  I have reviewed all pertinent imaging results/findings within the past 24 hours.  X-Ray Chest AP Portable  Narrative: EXAMINATION:  XR CHEST AP PORTABLE    CLINICAL HISTORY:  Fluid overload;    TECHNIQUE:  Single frontal view of the chest was performed.    COMPARISON:  07/19/2024    FINDINGS:  Right-sided central venous catheter stable.  Slight small lung volumes.  Continued ill-defined perihilar and bilateral lung opacities most pronounced along the lung bases concerning for evolving vascular congestion and edema.  There is no large pleural effusion or pneumothorax.  Prominence of the cardiac silhouette similar to prior allowing for portable  technique.  Clinical correlation and follow-up advised  Impression: Please see above    Electronically signed by: Leonard Rodriguez DO  Date:    07/20/2024  Time:    09:31

## 2024-07-21 NOTE — PLAN OF CARE
[facility]  Discharge Reassessment    Primary Care Provider: Gianni Escalona MD    Expected Discharge Date: 7/25/2024    Reassessment (most recent)       Discharge Reassessment - 07/21/24 1833          Discharge Reassessment    Assessment Type Discharge Planning Assessment (P)      Did the patient's condition or plan change since previous assessment? No (P)      Communicated ESTEBAN with patient/caregiver Date not available/Unable to determine (P)      Discharge Plan A Home (P)      Discharge Plan B Hospice/home (P)      DME Needed Upon Discharge  other (see comments) (P)    TBD.    Transition of Care Barriers None (P)      Why the patient remains in the hospital Requires continued medical care (P)         Post-Acute Status    Coverage [employer] (P)                    Discharge Plan A and Plan B have been determined by review of patient's clinical status, future medical and therapeutic needs, and coverage/benefits for post-acute care in coordination with multidisciplinary team members.     Gordon Echavarria LMSW

## 2024-07-21 NOTE — ASSESSMENT & PLAN NOTE
WHO group 2-3 per his managing pulmonologist (Danyell at [facility])  Adherent with diet, CPAP, and on continuous O2 3L at home.     -- RV dysfunction treatment as above  -- Pulm consulted, recs appreciated   -- Concern for RA per pulm, rheum consulted

## 2024-07-21 NOTE — ASSESSMENT & PLAN NOTE
Patient with a history of chronic CO2 retention, has labs with elevated bicarb for years (baseline appears upper 30's) on home NIV. Patient is adherent with home O2 and BiPAP. Per notes from [facility], there was some concern for inadequate ventilatory support at home due to worsening bicarb and Hgb, however patient has had multiple hospitalizations in the past calendar year alone for right heart failure. Most recent Echo confirming very high pulmonary artery systolic pressure, and some RV dysfunction but not severely dilating to the point of impeding left heart function. Patient's hypercapnia is multifactorial. Baseline ventilatory defect is present (severe obstruction on prior PFTs in the [facility] system) as well as sheer ventilatory restriction from significantly enlarged heart. Patient's baseline CO2 is hard to determine, but has been in the 70's with normal PH's on arterial blood gases. Patient has been appropriately aggressively diuresed during this hospitalization and may have developed a contraction alkalosis. It's likely some aspect of this increase in CO2 over the past few days is a compensatory response to his alkalosis. Has started diamox for assisted diuresis. Overall, patient has multiple severely morbid and high mortality conditions.     - ABG on 7/17 with normal pH of 7.37 and pCO2 of 70. Patient's pCO2 is at its baseline, patient with normal pH and pCO2 in the upper 70's and lower 80's on venous blood gas. This is a sign of well compensated chronic hypercapnic respiratory failure. Patient does not need continuous BiPAP, will only need it while he is sleeping. My bigger concern is patient's oxygenation, rather than his ventilation. He is requiring a high amount of support to maintain appropriate saturations. The patient's goal O2 saturation should be 88-92% and we should utilize whatever oxygen delivery system is needed to achieve those goals, including Comfort flow / high flow nasal cannula. Hypoxemia will  worsen patient's pulmonary hypertension.    - CTD workup ordered. RF has returned very high. He does appear to have parenchymal abnormalities on CT persistently with what appear to be cysts. 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 There is significant mosaicism on his CT previously which could be consistent with air trapping (prior PFTs with significantly elevated RV-TLC ratio consistent with air trapping), however PVOD / PH can have similar changes.    Recommendations  - Would continue diuresis   - BiPAP at night and with naps, otherwise OK for high-flow nasal cannula throughout the day. Would agree with keeping liters set to 60 and decrease FiO2 as able  - Goal sat is 88-92%  - Continue bronchodilators   - Will follow up rheumatologic workup

## 2024-07-21 NOTE — PLAN OF CARE
Nursing Care Plan    POC reviewed with Yong Mcintyre and family at 2000. Patient verbalized understanding. Questions and concerns addressed. No acute events overnight. Pt progressing toward goals. Will continue to monitor. See below and flowsheets for full assessment and VS info.     CODE Status: FULL    NAEON    Is this a stroke patient? no    NEURO:  Geoff Coma Scale  Best Eye Response: 4-->(E4) spontaneous  Best Motor Response: 6-->(M6) obeys commands  Best Verbal Response: 5-->(V5) oriented  [place] Coma Scale Score: 15  Assessment Qualifiers: patient not sedated/intubated  Pupil PERRLA: yes    Deficits: none  ICP:  none  Drains:  none    RASS: Goal = 0  RASS score:  CAM-ICU: Negative    Restraints:  NA                      24 hr Temp:  [97.1 °F (36.2 °C)-98.5 °F (36.9 °C)]     CV:   Rhythm: normal sinus rhythm  BP goals:   SBP < 140  MAP > 65    Lines: Right IJ  Gtts:  dobuta, Lasix    PULM:     Oxygen Concentration (%): 60    Oxygen: HFNC (during day) 60 L/ 60% Fio2; BiPAP 15/5 @ 60% during night  Vent:N/A  ABG: N/A  SBT/SAT:  N/A    GI/:     Diet/Nutrition Received: 2 gram sodium  Last Bowel Movement: 07/20/24  Voiding Characteristics: voids spontaneously without difficulty    Intake/Output Summary (Last 24 hours) at 7/21/2024 0546  Last data filed at 7/21/2024 0535  Gross per 24 hour   Intake 1452.84 ml   Output 1275 ml   Net 177.84 ml     Unmeasured Output  Urine Occurrence: 1  Stool Occurrence: 1  Emesis Occurrence: 0  Pad Count: 0    Diet: 2 gram sodium diet with 1.5 L water restriction  TF (Goal/Rate): N/A  Fluid Restriction: 1.5 L  Supplement: none    Dunne: Urinal during day; condom cath at night    Labs/Accuchecks:  Recent Labs   Lab 07/21/24  0336   WBC 5.65   RBC 5.17   HGB 12.8*   HCT 45.2         Recent Labs   Lab 07/21/24  0336      K 3.9   CO2 34*   CL 97   BUN 44*   CREATININE 1.9*   ALKPHOS 125   ALT 8*   AST 15   BILITOT 1.0      Recent Labs   Lab 07/21/24  0336   INR 2.1*  "   No results for input(s): "CPK", "CPKMB", "TROPONINI", "MB" in the last 168 hours.    Electrolytes: Electrolytes replaced  Accuchecks: none  Long Acting Insulin: None  Short Acting Insulin:     Path pending: none    ID:   Tmax:98.1  WBC:5.6  Culture (Last 5 days): none  Antibiotics: none    RAD:  Last Rad:  CXR 7/20    Wounds: none    PPX  BR: senna  GI:   DVT (Rx/ Mech):      Family: cousin    Nurses Note -- 4 Eyes    Is there altered skin present?  No    Chart reviewed and plan discussed with provider.     NURSING PLAN:   HFNC during day; BiPAP at night.   Replace electrolytes as ordered   No acute events.         "

## 2024-07-21 NOTE — ASSESSMENT & PLAN NOTE
xOn coumadin.    Lab Results   Component Value Date    INR 2.1 (H) 07/21/2024    INR 2.5 (H) 07/20/2024    INR 2.6 (H) 07/19/2024     -- Goal INR 2-3  -- Titrate warfarin daily    -- EP consulted for Aflutter, cardioverted 7/16

## 2024-07-21 NOTE — PROGRESS NOTES
[facility] - [facility]iac Intensive [facility]  Cardiology  Progress Note    Patient Name: Yong Mcintyre  MRN: [id number]  Admission Date: 7/4/2024  Hospital Length of Stay: 17 days  Code Status: Full Code   Attending Physician: Rossy Leary MD   Primary Care Physician: Gianni Escalona MD  Expected Discharge Date: 7/25/2024  Principal Problem:Right ventricular dysfunction    Subjective:     Hospital Course:   The patient was admitted to the CCU for further management of right-sided heart failure. He was diuresed with a lasix gtt. Electrolytes were monitored and repleted as indicated. Palliative care was consulted given his poor prognosis. The patient had significant O2 requirements on admission which were slow to wean. A central line was placed for close CVP monitoring in the setting of aggressive diuresis. Nutrition consult placed for low salt diet education. Acetazolamide was added to his diuretic regimen due to persistent metabolic alkalosis. The patient required intermittent Bipap due to hypercapnic respiratory failure. Dobutamine was added to augment cardiac output to further diurese patient. The patient was in aflutter, EP was consulted. S/p cardioversion 7/16/24 with successful conversion to sinus rhythm. Pulm consulted for hypercapnia. Per pulm ok to remain on HFNC. Presentation concerning for end stage RA causing pum HTN, rheum consulted and recommendations appreciated. No therapies available, but rheum has ordered work up. Switched from IV to PO amio. Continued diamox. Patient to consider Palliative care. Palliative medicine consulted and patient expresses wishes for sister to be a part of discussions.     Interval History: No overnight events. Still requiring HF oxygen and intermittent BIPAP. Patient given diuril for decreased urine output.    ROS  Objective:     Vital Signs (Most Recent):  Temp: 98.4 °F (36.9 °C) (07/21/24 1101)  Pulse: 90 (07/21/24 1331)  Resp: 20 (07/21/24 1331)  BP: (!) 96/58 (07/21/24  1201)  SpO2: 97 % (07/21/24 1331) Vital Signs (24h Range):  Temp:  [97.1 °F (36.2 °C)-98.5 °F (36.9 °C)] 98.4 °F (36.9 °C)  Pulse:  [87-99] 90  Resp:  [12-40] 20  SpO2:  [88 %-100 %] 97 %  BP: ()/(54-71) 96/58     Weight: 97.5 kg (214 lb 15.2 oz)  Body mass index is 35.77 kg/m².     SpO2: 97 %         Intake/Output Summary (Last 24 hours) at 7/21/2024 1515  Last data filed at 7/21/2024 1301  Gross per 24 hour   Intake 1003.54 ml   Output 1725 ml   Net -721.46 ml       Lines/Drains/Airways       Central Venous Catheter Line  Duration             Percutaneous Central Line - Triple Lumen  07/08/24 1145 Internal Jugular Right 13 days              Peripheral Intravenous Line  Duration                  Peripheral IV - Single Lumen 07/20/24 2000 18 G Anterior;Proximal;Right Forearm <1 day                       Physical Exam  Vitals reviewed.   Constitutional:       General: He is not in acute distress.     Appearance: He is obese. He is ill-appearing.   Cardiovascular:      Rate and Rhythm: Normal rate and regular rhythm.   Pulmonary:      Effort: No respiratory distress.      Breath sounds: No rhonchi.   Abdominal:      General: Abdomen is flat.   Musculoskeletal:      Right lower leg: No edema.      Left lower leg: No edema.   Skin:     General: Skin is warm.   Neurological:      Mental Status: He is alert.          Significant Labs:   Recent Labs   Lab 07/19/24  0618 07/20/24  0415 07/21/24  0336   WBC 5.52 5.89 5.65   HGB 12.8* 12.6* 12.8*   HCT 46.3 45.0 45.2   * 151 157       Recent Labs   Lab 07/17/24  0305 07/17/24  1218 07/18/24  0211 07/18/24  2355 07/19/24  0616      < > 138 138 139   K 3.3*   < > 3.2* 3.0* 3.5   CL 93*   < > 92* 93* 93*   CO2 37*   < > 38* 36* 39*   BUN 51*   < > 49* 46* 44*   CREATININE 1.9*   < > 2.0* 1.7* 1.8*   CALCIUM 9.7   < > 9.4 9.2 9.5   PHOS 4.5  --  4.4  --  4.0        Recent Labs   Lab 07/19/24  0616 07/20/24  0415 07/21/24  0336   ALKPHOS 124 128 125   BILITOT  "1.2* 1.2* 1.0   PROT 7.6 7.6 7.7   ALT 8* 6* 8*   AST 15 16 15     Assessment and Plan:       * Right ventricular dysfunction  Acute on chronic. - NYHA class III symptoms with recurrent admissions.  - He was seen by [facility] outpatient 6/2024 who state patient was feeling better on new diuretic regimen however worried about the frequency of use of metolazone and his worsening kidney function.    - Admitted with CXR with cardiomegaly and pulmonary vascular congestion, suggestive of pulmonary edema secondary to CHF. BNP elevated at 800. Creatinine worse at 3.1 on admission.  - Given his recurrent HF admissions and most recent hemodynamics, [facility] believes his overall prognosis is poor and recommended palliative care consult. Palliative care evaluated patient. Patient wishes to continue full measures at this time.     - 2 gram sodium restriction and 1500cc fluid restriction.  - Encourage physical activity with graded exercise program.  - Acetazolamide PO for signs of contraction alkalosis   - lasix gtt   - Continue dobutamine gtt  - amio PO  - Diuril as needed  - Monitor lytes BID; replete as indicated    Hypoventilation syndrome  BIPAP QHS; NC during meals. Avoid HFNC.  Significant hypercapnia w/ pCO2 80-90; will start daytime BiPap    - BIPAP for ventilation preferred over high O2      Acute decompensated heart failure  See "RV dysfunction" for plan    Atypical atrial flutter  - New onset  - On warfarin for Afib  - Patient on warfarin but has been subtherapeutic, will need to assess risk of cardioversion vs benefit to avoid HF in a patient with HF and tachycardia    - Continue AC as per Afib  - Started Amiodarone for rhythm control  - Monitor electrolytes  -continued in Aflutter consulted EP, s/p DCCV/DAWIT on 7/16      Metabolic alkalosis  Bicarb 40's in setting of aggressive diuresis. Suspect contraction alkalosis.    - Continue diamox  - Trend Bicarb    Acute on chronic respiratory failure with hypoxia  Patient with " Hypercapnic and Hypoxic Respiratory failure which is Acute on chronic.  he is on home oxygen at 3 LPM. Supplemental oxygen was provided and noted- Oxygen Concentration (%):  [50-80] 50    Signs/symptoms of respiratory failure include- tachypnea and increased work of breathing. Contributing diagnoses includes - CHF Labs and images were reviewed. Patient Has recent ABG, which has been reviewed.    -- Trend VBG BID  -- BiPap during night and naps; okay for HFNC throughout the day per pulm  -- Goal SpO2 > 88%    Acute on chronic right-sided congestive heart failure  - See 'RV dysfunction'    Paroxysmal A-fib  xOn coumadin.    Lab Results   Component Value Date    INR 2.1 (H) 07/21/2024    INR 2.5 (H) 07/20/2024    INR 2.6 (H) 07/19/2024     -- Goal INR 2-3  -- Titrate warfarin daily    -- EP consulted for Aflutter, cardioverted 7/16    Palliative care encounter  - patient not amenable to palliative options   - pt asked primary team to speak to sister about current medical state and hospice.     MALLIKA (obstructive sleep apnea)  See 'hypoventilation syndrome'    Pulmonary hypertension  WHO group 2-3 per his managing pulmonologist (Danyell at [facility])  Adherent with diet, CPAP, and on continuous O2 3L at home.     -- RV dysfunction treatment as above  -- Pulm consulted, recs appreciated   -- Concern for RA per pulm, rheum consulted        VTE Risk Mitigation (From admission, onward)           Ordered     warfarin (COUMADIN) tablet 5 mg  Once per day on Sunday Monday Wednesday Friday Saturday 07/19/24 0830     IP VTE HIGH RISK PATIENT  Once         07/04/24 2302     Place sequential compression device  Until discontinued         07/04/24 2302                    Joseph Gonzalez DO  Cardiology  [facility] - [facility]

## 2024-07-21 NOTE — CONSULTS
GUSTAVO consulted for PICC for home inotropes; GFR 43 (<45); nephrology messaged for clearance (Dr. Sadia Blood), awaiting clearance or not

## 2024-07-21 NOTE — PROGRESS NOTES
[facility] - Cardiac Intensive Care  Pulmonology  Progress Note    Patient Name: Yong Mcintyre  MRN: [id number]  Admission Date: 7/4/2024  Hospital Length of Stay: 16 days  Code Status: Full Code  Attending Provider: Rossy Leary MD  Primary Care Provider: Gianni Escalona MD   Principal Problem: Right ventricular dysfunction    Subjective:     Interval History: Patient seen upright in the chair with better saturations. Still on 60L and 60% FiO2. No new complaints today.      Objective:     Vital Signs (Most Recent):  Temp: 98.1 °F (36.7 °C) (07/20/24 1500)  Pulse: 91 (07/20/24 2042)  Resp: 20 (07/20/24 2042)  BP: (!) 91/54 (07/20/24 1800)  SpO2: 100 % (07/20/24 2042) Vital Signs (24h Range):  Temp:  [98 °F (36.7 °C)-98.5 °F (36.9 °C)] 98.1 °F (36.7 °C)  Pulse:  [83-96] 91  Resp:  [10-34] 20  SpO2:  [86 %-100 %] 100 %  BP: ()/(50-68) 91/54     Weight: 97.5 kg (214 lb 15.2 oz)  Body mass index is 35.77 kg/m².      Intake/Output Summary (Last 24 hours) at 7/20/2024 2125  Last data filed at 7/20/2024 1800  Gross per 24 hour   Intake 1292.46 ml   Output 1785 ml   Net -492.54 ml        Physical Exam  Vitals and nursing note reviewed.   Constitutional:       General: He is not in acute distress.     Appearance: He is not toxic-appearing.      Interventions: Nasal cannula in place.   HENT:      Head: Normocephalic and atraumatic.      Nose: Congestion present.      Mouth/Throat:      Mouth: Mucous membranes are moist.   Eyes:      General: No scleral icterus.     Pupils: Pupils are equal, round, and reactive to light.   Neck:      Comments: Central line in place, dressing clean and intact  Cardiovascular:      Rate and Rhythm: Tachycardia present. Rhythm irregular.      Heart sounds: Normal heart sounds.   Pulmonary:      Effort: No respiratory distress.      Breath sounds: Rales present. No wheezing.      Comments: Bibasilar crackles.  Abdominal:      Palpations: Abdomen is soft.      Tenderness: There is  no abdominal tenderness. There is no guarding.   Musculoskeletal:      Right lower leg: Edema present.      Left lower leg: Edema present.   Skin:     General: Skin is warm and dry.   Neurological:      Mental Status: He is alert and oriented to person, place, and time.   Psychiatric:         Mood and Affect: Mood normal.         Behavior: Behavior normal. Behavior is cooperative.           Review of Systems    Vents:  Oxygen Concentration (%): 60 (07/20/24 2042)    Lines/Drains/Airways       Central Venous Catheter Line  Duration             Percutaneous Central Line - Triple Lumen  07/08/24 1145 Internal Jugular Right 12 days              Peripheral Intravenous Line  Duration                  Peripheral IV - Single Lumen 07/20/24 2000 18 G Anterior;Proximal;Right Forearm <1 day                    Significant Labs:    CBC/Anemia Profile:  Recent Labs   Lab 07/19/24  0618 07/20/24  0415   WBC 5.52 5.89   HGB 12.8* 12.6*   HCT 46.3 45.0   * 151   MCV 88 87   RDW 22.1* 22.4*        Chemistries:  Recent Labs   Lab 07/19/24  0616 07/20/24  0415 07/20/24  0917 07/20/24  1138 07/20/24  1810    138 140 137 139   K 3.5 2.8* 3.5 3.6 3.5   CL 93* 93* 95 94* 99   CO2 39* 38* 36* 35* 30*   BUN 44* 41* 39* 42* 39*   CREATININE 1.8* 1.7* 1.8* 1.8* 1.7*   CALCIUM 9.5 9.3 9.4 9.2 8.5*   ALBUMIN 2.6* 2.6*  --   --  2.5*   PROT 7.6 7.6  --   --   --    BILITOT 1.2* 1.2*  --   --   --    ALKPHOS 124 128  --   --   --    ALT 8* 6*  --   --   --    AST 15 16  --   --   --    MG 2.2 1.9  --  2.5 2.1   PHOS 4.0 3.9  --   --  3.2       All pertinent labs within the past 24 hours have been reviewed.    Significant Imaging:  I have reviewed all pertinent imaging results/findings within the past 24 hours.  X-Ray Chest AP Portable  Narrative: EXAMINATION:  XR CHEST AP PORTABLE    CLINICAL HISTORY:  Fluid overload;    TECHNIQUE:  Single frontal view of the chest was performed.    COMPARISON:  07/19/2024    FINDINGS:  Right-sided  central venous catheter stable.  Slight small lung volumes.  Continued ill-defined perihilar and bilateral lung opacities most pronounced along the lung bases concerning for evolving vascular congestion and edema.  There is no large pleural effusion or pneumothorax.  Prominence of the cardiac silhouette similar to prior allowing for portable technique.  Clinical correlation and follow-up advised  Impression: Please see above    Electronically signed by: Leonard Rodriguez DO  Date:    07/20/2024  Time:    09:31      Assessment/Plan:     Pulmonary  Acute on chronic respiratory failure with hypoxia  Patient with a history of chronic CO2 retention, has labs with elevated bicarb for years (baseline appears upper 30's) on home NIV. Patient is adherent with home O2 and BiPAP. Per notes from [facility], there was some concern for inadequate ventilatory support at home due to worsening bicarb and Hgb, however patient has had multiple hospitalizations in the past calendar year alone for right heart failure. Most recent Echo confirming very high pulmonary artery systolic pressure, and some RV dysfunction but not severely dilating to the point of impeding left heart function. Patient's hypercapnia is multifactorial. Baseline ventilatory defect is present (severe obstruction on [place] [facility] [place] as well as sheer ventilatory restriction from significantly enlarged heart. Patient's baseline CO2 is hard to determine, but has been in the 70's with normal PH's on arterial blood gases. Patient has been appropriately aggressively diuresed during this hospitalization and may have developed a contraction alkalosis. It's likely some aspect of this increase in CO2 over the past few days is a compensatory response to his alkalosis. Has started diamox for assisted diuresis. Overall, patient has multiple severely morbid and high mortality conditions.     - ABG on 7/17 with normal pH of 7.37 and pCO2 of 70. Patient's pCO2 is at its baseline,  patient with normal pH and pCO2 in the upper 70's and lower 80's on venous blood gas. This is a sign of well compensated chronic hypercapnic respiratory failure. Patient does not need continuous BiPAP, will only need it while he is sleeping. My bigger concern is patient's oxygenation, rather than his ventilation. He is requiring a high amount of support to maintain appropriate saturations. The patient's goal O2 saturation should be 88-92% and we should utilize whatever oxygen delivery system is needed to achieve those goals, including Comfort flow / high flow nasal cannula. Hypoxemia will worsen patient's pulmonary hypertension.    - CTD workup ordered. RF has returned very high. He does appear to have parenchymal abnormalities on CT persistently with what appear to be cysts. 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 There is significant mosaicism on his CT previously which could be consistent with air trapping (prior PFTs with significantly elevated RV-TLC ratio consistent with air trapping), however PVOD / PH can have similar changes.    Recommendations  - Would continue diuresis   - BiPAP at night and with naps, otherwise OK for high-flow nasal cannula throughout the day. Would agree with keeping liters set to 60 and decrease FiO2 as able  - Goal sat is 88-92%  - Continue bronchodilators   - Will follow up rheumatologic workup    Cardiac/Vascular  Pulmonary hypertension  Patient with severe PH, last RHC in 1/2024 with Wedge of 15 (24 with exercise) and PVR of 10. Was previously on Bosentan in the past but has been off for some time due to lack of indication. Seems WHO group 2-3 moreso per Dr. Child's last note.    Regardless, minimal data for efficacy on PH specific meds in group 2 and 3 disease. Could lead to worsening VQ mismatch / left heart overload and worsen his symptoms.  - Evaluating for CTD-ILD which may be contributing to patient's PH                 Ibrahima  MD [place]  Pulmonology  [facility] - Cardiac [facility]

## 2024-07-21 NOTE — ASSESSMENT & PLAN NOTE
Acute on chronic. - NYHA class III symptoms with recurrent admissions.  - He was seen by [facility] outpatient 6/2024 who state patient was feeling better on new diuretic regimen however worried about the frequency of use of metolazone and his worsening kidney function.    - Admitted with CXR with cardiomegaly and pulmonary vascular congestion, suggestive of pulmonary edema secondary to CHF. BNP elevated at 800. Creatinine worse at 3.1 on admission.  - Given his recurrent HF admissions and most recent hemodynamics, [facility] believes his overall prognosis is poor and recommended palliative care consult. Palliative care evaluated patient. Patient wishes to continue full measures at this time.     - 2 gram sodium restriction and 1500cc fluid restriction.  - Encourage physical activity with graded exercise program.  - Acetazolamide PO for signs of contraction alkalosis   - lasix gtt   - Continue dobutamine gtt  - amio PO  - Diuril as needed  - Monitor lytes BID; replete as indicated

## 2024-07-21 NOTE — SUBJECTIVE & OBJECTIVE
Interval History: No overnight events. Still requiring HF oxygen and intermittent BIPAP. Patient given diuril for decreased urine output.    ROS  Objective:     Vital Signs (Most Recent):  Temp: 98.4 °F (36.9 °C) (07/21/24 1101)  Pulse: 90 (07/21/24 1331)  Resp: 20 (07/21/24 1331)  BP: (!) 96/58 (07/21/24 1201)  SpO2: 97 % (07/21/24 1331) Vital Signs (24h Range):  Temp:  [97.1 °F (36.2 °C)-98.5 °F (36.9 °C)] 98.4 °F (36.9 °C)  Pulse:  [87-99] 90  Resp:  [12-40] 20  SpO2:  [88 %-100 %] 97 %  BP: ()/(54-71) 96/58     Weight: 97.5 kg (214 lb 15.2 oz)  Body mass index is 35.77 kg/m².     SpO2: 97 %         Intake/Output Summary (Last 24 hours) at 7/21/2024 1515  Last data filed at 7/21/2024 1301  Gross per 24 hour   Intake 1003.54 ml   Output 1725 ml   Net -721.46 ml       Lines/Drains/Airways       Central Venous Catheter Line  Duration             Percutaneous Central Line - Triple Lumen  07/08/24 1145 Internal Jugular Right 13 days              Peripheral Intravenous Line  Duration                  Peripheral IV - Single Lumen 07/20/24 2000 18 G Anterior;Proximal;Right Forearm <1 day                       Physical Exam  Vitals reviewed.   Constitutional:       General: He is not in acute distress.     Appearance: He is obese. He is ill-appearing.   Cardiovascular:      Rate and Rhythm: Normal rate and regular rhythm.   Pulmonary:      Effort: No respiratory distress.      Breath sounds: No rhonchi.   Abdominal:      General: Abdomen is flat.   Musculoskeletal:      Right lower leg: No edema.      Left lower leg: No edema.   Skin:     General: Skin is warm.   Neurological:      Mental Status: He is alert.          Significant Labs:   Recent Labs   Lab 07/19/24  0618 07/20/24  0415 07/21/24  0336   WBC 5.52 5.89 5.65   HGB 12.8* 12.6* 12.8*   HCT 46.3 45.0 45.2   * 151 157       Recent Labs   Lab 07/17/24  0305 07/17/24  1218 07/18/24  0211 07/18/24  2355 07/19/24  0616      < > 138 138 139   K 3.3*    < > 3.2* 3.0* 3.5   CL 93*   < > 92* 93* 93*   CO2 37*   < > 38* 36* 39*   BUN 51*   < > 49* 46* 44*   CREATININE 1.9*   < > 2.0* 1.7* 1.8*   CALCIUM 9.7   < > 9.4 9.2 9.5   PHOS 4.5  --  4.4  --  4.0        Recent Labs   Lab 07/19/24  0616 07/20/24  0415 07/21/24  0336   ALKPHOS 124 128 125   BILITOT 1.2* 1.2* 1.0   PROT 7.6 7.6 7.7   ALT 8* 6* 8*   AST 15 16 15

## 2024-07-21 NOTE — ASSESSMENT & PLAN NOTE
Patient with Hypercapnic and Hypoxic Respiratory failure which is Acute on chronic.  he is on home oxygen at 3 LPM. Supplemental oxygen was provided and noted- Oxygen Concentration (%):  [50-80] 50    Signs/symptoms of respiratory failure include- tachypnea and increased work of breathing. Contributing diagnoses includes - CHF Labs and images were reviewed. Patient Has recent ABG, which has been reviewed.    -- Trend VBG BID  -- BiPap during night and naps; okay for HFNC throughout the day per pulm  -- Goal SpO2 > 88%

## 2024-07-21 NOTE — PLAN OF CARE
CICU DAILY GOALS       A: Awake    RASS: Goal - RASS Goal: 0-->alert and calm  Actual - RASS (Carter Agitation-Sedation Scale): alert and calm   Restraint necessity:    B: Breath   SBT: Not intubated   C: Coordinate A & B, analgesics/sedatives   Pain: managed    SAT: Not intubated  D: Delirium   CAM-ICU: Overall CAM-ICU: Negative  E: Early(intubated/ Progressive (non-intubated) Mobility   MOVE Screen: Pass   Activity: Activity Management: Up in chair - L3  FAS: Feeding/Nutrition   Diet order: Diet/Nutrition Received: 2 gram sodium, low saturated fat/low cholesterol,   Fluid restriction: Fluid Requirement: 1500 ml FR   Nutritional Supplement Intake: Quantity , Type:   T: Thrombus   DVT prophylaxis: VTE Required Core Measure: Pharmacological prophylaxis initiated/maintained  H: HOB Elevation   Head of Bed (HOB) Positioning: HOB at 30 degrees  U: Ulcer Prophylaxis   GI: yes  G: Glucose control   managed      S: Skin   Nurses Note -- 4 Eyes  Skin assessed during: Q Shift Change   CHOOSE ONE:  [] No Altered Skin Integrity Present      []Prevention Measures Documented    [] Yes- Altered Skin Integrity Present or Discovered     [x] LDA Added if Not in Epic (Describe Wound)     [] New Altered Skin Integrity was Present on Admit and Documented in LDA     [] Wound Image Taken  Wound Care Consulted? Yes  Attending Nurse:  Umair Natarajan RN/Staff Member:  ADRIEL Martinez    B: Bowel Function   no issues   I: Indwelling Catheters   [place]ne necessity:     CVC necessity: Yes   IPAD offered: Not appropriate  D: De-escalation Antibx   No  Plan for the day     Family/Goals of care/Code Status   Code Status: Full Code     No acute events throughout day, VS and assessment per flow sheet, patient progressing towards goals as tolerated, plan of care reviewed with Yong Mcintyre and family, all concerns addressed, will continue to monitor.   Sitting chair all day.

## 2024-07-21 NOTE — NURSING
"ICU Care Plan    No acute events today. POC reviewed with Yong Mcintyre and family, questions and concerns addressed. See below and flowsheets for full assessment and VS info.    Principal Problem: Right ventricular dysfunction    Vital Signs: BP (!) 100/57   Pulse 94   Temp 98.5 °F (36.9 °C) (Oral)   Resp (!) 30   Ht 5' 5" (1.651 m)   Wt 97.5 kg (214 lb 15.2 oz)   SpO2 (!) 93%   BMI 35.77 kg/m²     Neuro  AAO x4, Follows Commands, and Moves All Extremities  Drayden Coma Scale  Best Eye Response: 4-->(E4) spontaneous  Best Motor Response: 6-->(M6) obeys commands  Best Verbal Response: 5-->(V5) oriented  Drayden Coma Scale Score: 15  Assessment Qualifiers: patient not sedated/intubated, no eye obstruction present  24 hr Temp:  [97.1 °F (36.2 °C)-98.5 °F (36.9 °C)]     Resp  Airvo 40L/50%  BIPAP HS     CV  Rhythm: normal sinus rhythm  Pulse range: 86-97  BP range: 95//66  EF: 35-40% (7/5/24)  MAP > 65, SBP < 180  K > 4, Mg > 2    DVT prophylaxis: VTE Required Core Measure: Pharmacological prophylaxis initiated/maintained    GI/  2g Sodium diet  1.5 L FR  Accuchecks: PRN  Electrolytes: Replaced    Voiding Characteristics: voids spontaneously without difficulty  Urine Output: Voids Spontaneously 1900 cc/shift  Last Bowel Movement: 07/21/24      Intake/Output Summary (Last 24 hours) at 7/21/2024 1851  Last data filed at 7/21/2024 1801  Gross per 24 hour   Intake 1533.88 ml   Output 2300 ml   Net -766.12 ml           DOBUTamine IV infusion (non-titrating)  2.5 mcg/kg/min Intravenous Continuous 3.9 mL/hr at 07/21/24 1801 2.5 mcg/kg/min at 07/21/24 1801    furosemide (Lasix) 500 mg in 50 mL infusion (conc: 10 mg/mL)  40 mg/hr Intravenous Continuous 4 mL/hr at 07/21/24 1801 40 mg/hr at 07/21/24 1801     Lines/Drains/Airways       Central Venous Catheter Line  Duration             Percutaneous Central Line - Triple Lumen  07/08/24 1145 Internal Jugular Right 13 days              Peripheral Intravenous Line  " Duration                  Peripheral IV - Single Lumen 07/20/24 2000 18 G Anterior;Proximal;Right Forearm <1 day                  Skin  Bathing/Skin Care: linen changed (07/20/24 2000)    Nurses Note -- 4 Eyes     Is there altered skin present?  No    Skin precautions maintained including:  Sacrum and heels with foam dressing in place for pressure protection. Frequent weight shift encouraged / assistance provided Q2 hr to prevent further breakdown. Bed plugged in and mattress inflated; continuous rotational turning bed feature / [employer] bed utilized. Adhesive use limited. Heels elevated off bed. Positioned off wounds. Pressure points protected and positioning supports utilized.  Skin-to-device areas padded. Skin-to-skin areas padded

## 2024-07-22 PROBLEM — J96.12 CHRONIC RESPIRATORY FAILURE WITH HYPOXIA AND HYPERCAPNIA: Chronic | Status: RESOLVED | Noted: 2024-02-12 | Resolved: 2024-07-22

## 2024-07-22 PROBLEM — J96.11 CHRONIC RESPIRATORY FAILURE WITH HYPOXIA AND HYPERCAPNIA: Chronic | Status: RESOLVED | Noted: 2024-02-12 | Resolved: 2024-07-22

## 2024-07-22 LAB
ALBUMIN SERPL BCP-MCNC: 2.4 G/DL (ref 3.5–5.2)
ALLENS TEST: ABNORMAL
ALP SERPL-CCNC: 116 U/L (ref 55–135)
ALT SERPL W/O P-5'-P-CCNC: 7 U/L (ref 10–44)
ANION GAP SERPL CALC-SCNC: 11 MMOL/L (ref 8–16)
ANION GAP SERPL CALC-SCNC: 8 MMOL/L (ref 8–16)
AST SERPL-CCNC: 16 U/L (ref 10–40)
B2 GLYCOPROT1 IGA SER QL: 5 U/ML
B2 GLYCOPROT1 IGG SER QL: 25 U/ML
B2 GLYCOPROT1 IGM SER QL: 4.2 U/ML
BASOPHILS # BLD AUTO: 0.04 K/UL (ref 0–0.2)
BASOPHILS NFR BLD: 0.7 % (ref 0–1.9)
BILIRUB SERPL-MCNC: 0.9 MG/DL (ref 0.1–1)
BUN SERPL-MCNC: 46 MG/DL (ref 6–20)
BUN SERPL-MCNC: 48 MG/DL (ref 6–20)
CALCIUM SERPL-MCNC: 8.9 MG/DL (ref 8.7–10.5)
CALCIUM SERPL-MCNC: 9.4 MG/DL (ref 8.7–10.5)
CARDIOLIPIN IGG SER IA-ACNC: <9.4 GPL (ref 0–14.99)
CARDIOLIPIN IGM SER IA-ACNC: <9.4 MPL (ref 0–12.49)
CHLORIDE SERPL-SCNC: 92 MMOL/L (ref 95–110)
CHLORIDE SERPL-SCNC: 95 MMOL/L (ref 95–110)
CO2 SERPL-SCNC: 33 MMOL/L (ref 23–29)
CO2 SERPL-SCNC: 35 MMOL/L (ref 23–29)
CREAT SERPL-MCNC: 1.8 MG/DL (ref 0.5–1.4)
CREAT SERPL-MCNC: 1.9 MG/DL (ref 0.5–1.4)
DELSYS: ABNORMAL
DIFFERENTIAL METHOD BLD: ABNORMAL
ENA SCL70 AB SER-ACNC: 0.8 U/ML
EOSINOPHIL # BLD AUTO: 0.2 K/UL (ref 0–0.5)
EOSINOPHIL NFR BLD: 3 % (ref 0–8)
EP: 5
ERYTHROCYTE [DISTWIDTH] IN BLOOD BY AUTOMATED COUNT: 21.9 % (ref 11.5–14.5)
ERYTHROCYTE [SEDIMENTATION RATE] IN BLOOD BY WESTERGREN METHOD: 26 MM/H
EST. GFR  (NO RACE VARIABLE): 43 ML/MIN/1.73 M^2
EST. GFR  (NO RACE VARIABLE): 45.9 ML/MIN/1.73 M^2
FIO2: 40
FIO2: 50
FIO2: 60
FLOW: 40
FLOW: 50
GLUCOSE SERPL-MCNC: 113 MG/DL (ref 70–110)
GLUCOSE SERPL-MCNC: 87 MG/DL (ref 70–110)
HCO3 UR-SCNC: 38.6 MMOL/L (ref 24–28)
HCT VFR BLD AUTO: 43.2 % (ref 40–54)
HGB BLD-MCNC: 12.2 G/DL (ref 14–18)
IMM GRANULOCYTES # BLD AUTO: 0.02 K/UL (ref 0–0.04)
IMM GRANULOCYTES NFR BLD AUTO: 0.3 % (ref 0–0.5)
INR PPP: 2 (ref 0.8–1.2)
IP: 15
LYMPHOCYTES # BLD AUTO: 1.3 K/UL (ref 1–4.8)
LYMPHOCYTES NFR BLD: 22.9 % (ref 18–48)
MAGNESIUM SERPL-MCNC: 1.7 MG/DL (ref 1.6–2.6)
MAGNESIUM SERPL-MCNC: 2.4 MG/DL (ref 1.6–2.6)
MCH RBC QN AUTO: 24.6 PG (ref 27–31)
MCHC RBC AUTO-ENTMCNC: 28.2 G/DL (ref 32–36)
MCV RBC AUTO: 87 FL (ref 82–98)
MIN VOL: 14.7
MODE: ABNORMAL
MONOCYTES # BLD AUTO: 0.4 K/UL (ref 0.3–1)
MONOCYTES NFR BLD: 6.3 % (ref 4–15)
NEUTROPHILS # BLD AUTO: 3.8 K/UL (ref 1.8–7.7)
NEUTROPHILS NFR BLD: 66.8 % (ref 38–73)
NRBC BLD-RTO: 0 /100 WBC
PCO2 BLDA: 75 MMHG (ref 35–45)
PH SMN: 7.32 [PH] (ref 7.35–7.45)
PHOSPHATE SERPL-MCNC: 4.4 MG/DL (ref 2.7–4.5)
PLATELET # BLD AUTO: 169 K/UL (ref 150–450)
PMV BLD AUTO: 10.5 FL (ref 9.2–12.9)
PO2 BLDA: 27 MMHG (ref 40–60)
PO2 BLDA: 32 MMHG (ref 40–60)
PO2 BLDA: 34 MMHG (ref 40–60)
POC BE: 13 MMOL/L
POC SATURATED O2: 43 % (ref 95–100)
POC SATURATED O2: 53 % (ref 95–100)
POC SATURATED O2: 56 % (ref 95–100)
POC TCO2: 41 MMOL/L (ref 24–29)
POCT GLUCOSE: 106 MG/DL (ref 70–110)
POCT GLUCOSE: 79 MG/DL (ref 70–110)
POTASSIUM SERPL-SCNC: 3 MMOL/L (ref 3.5–5.1)
POTASSIUM SERPL-SCNC: 4.3 MMOL/L (ref 3.5–5.1)
PROT SERPL-MCNC: 7.3 G/DL (ref 6–8.4)
PROTHROMBIN TIME: 21.4 SEC (ref 9–12.5)
RBC # BLD AUTO: 4.95 M/UL (ref 4.6–6.2)
SAMPLE: ABNORMAL
SITE: ABNORMAL
SODIUM SERPL-SCNC: 136 MMOL/L (ref 136–145)
SODIUM SERPL-SCNC: 138 MMOL/L (ref 136–145)
SP02: 89
SP02: 91
SPONT RATE: 1
WBC # BLD AUTO: 5.73 K/UL (ref 3.9–12.7)

## 2024-07-22 PROCEDURE — C1751 CATH, INF, PER/CENT/MIDLINE: HCPCS

## 2024-07-22 PROCEDURE — 99900035 HC TECH TIME PER 15 MIN (STAT)

## 2024-07-22 PROCEDURE — 94799 UNLISTED PULMONARY SVC/PX: CPT

## 2024-07-22 PROCEDURE — 25000003 PHARM REV CODE 250: Performed by: STUDENT IN AN ORGANIZED HEALTH CARE EDUCATION/TRAINING PROGRAM

## 2024-07-22 PROCEDURE — 97162 PT EVAL MOD COMPLEX 30 MIN: CPT

## 2024-07-22 PROCEDURE — 27100171 HC OXYGEN HIGH FLOW UP TO 24 HOURS

## 2024-07-22 PROCEDURE — 63600175 PHARM REV CODE 636 W HCPCS

## 2024-07-22 PROCEDURE — 63600175 PHARM REV CODE 636 W HCPCS: Performed by: STUDENT IN AN ORGANIZED HEALTH CARE EDUCATION/TRAINING PROGRAM

## 2024-07-22 PROCEDURE — 97165 OT EVAL LOW COMPLEX 30 MIN: CPT

## 2024-07-22 PROCEDURE — 99232 SBSQ HOSP IP/OBS MODERATE 35: CPT | Mod: GC,,, | Performed by: INTERNAL MEDICINE

## 2024-07-22 PROCEDURE — 99231 SBSQ HOSP IP/OBS SF/LOW 25: CPT | Mod: GC,,, | Performed by: INTERNAL MEDICINE

## 2024-07-22 PROCEDURE — 99233 SBSQ HOSP IP/OBS HIGH 50: CPT | Mod: ,,, | Performed by: EMERGENCY MEDICINE

## 2024-07-22 PROCEDURE — 76937 US GUIDE VASCULAR ACCESS: CPT

## 2024-07-22 PROCEDURE — 36573 INSJ PICC RS&I 5 YR+: CPT

## 2024-07-22 PROCEDURE — 20000000 HC ICU ROOM

## 2024-07-22 PROCEDURE — 80053 COMPREHEN METABOLIC PANEL: CPT

## 2024-07-22 PROCEDURE — 02HV33Z INSERTION OF INFUSION DEVICE INTO SUPERIOR VENA CAVA, PERCUTANEOUS APPROACH: ICD-10-PCS | Performed by: INTERNAL MEDICINE

## 2024-07-22 PROCEDURE — 85025 COMPLETE CBC W/AUTO DIFF WBC: CPT

## 2024-07-22 PROCEDURE — 85610 PROTHROMBIN TIME: CPT

## 2024-07-22 PROCEDURE — 83735 ASSAY OF MAGNESIUM: CPT | Mod: 91 | Performed by: INTERNAL MEDICINE

## 2024-07-22 PROCEDURE — 25000003 PHARM REV CODE 250: Performed by: INTERNAL MEDICINE

## 2024-07-22 PROCEDURE — 25000003 PHARM REV CODE 250

## 2024-07-22 PROCEDURE — 94640 AIRWAY INHALATION TREATMENT: CPT

## 2024-07-22 PROCEDURE — 82803 BLOOD GASES ANY COMBINATION: CPT

## 2024-07-22 PROCEDURE — 94660 CPAP INITIATION&MGMT: CPT

## 2024-07-22 PROCEDURE — 80048 BASIC METABOLIC PNL TOTAL CA: CPT | Mod: XB | Performed by: INTERNAL MEDICINE

## 2024-07-22 PROCEDURE — 83735 ASSAY OF MAGNESIUM: CPT

## 2024-07-22 PROCEDURE — 97530 THERAPEUTIC ACTIVITIES: CPT

## 2024-07-22 PROCEDURE — 84100 ASSAY OF PHOSPHORUS: CPT

## 2024-07-22 PROCEDURE — 94761 N-INVAS EAR/PLS OXIMETRY MLT: CPT | Mod: XB

## 2024-07-22 PROCEDURE — 97535 SELF CARE MNGMENT TRAINING: CPT

## 2024-07-22 RX ORDER — MAGNESIUM SULFATE HEPTAHYDRATE 40 MG/ML
2 INJECTION, SOLUTION INTRAVENOUS ONCE
Status: COMPLETED | OUTPATIENT
Start: 2024-07-22 | End: 2024-07-22

## 2024-07-22 RX ORDER — SODIUM CHLORIDE 9 MG/ML
INJECTION, SOLUTION INTRAVENOUS CONTINUOUS
Status: DISCONTINUED | OUTPATIENT
Start: 2024-07-22 | End: 2024-07-23

## 2024-07-22 RX ORDER — MAGNESIUM SULFATE 1 G/100ML
1 INJECTION INTRAVENOUS ONCE
Status: COMPLETED | OUTPATIENT
Start: 2024-07-22 | End: 2024-07-22

## 2024-07-22 RX ORDER — SODIUM CHLORIDE 0.9 % (FLUSH) 0.9 %
10 SYRINGE (ML) INJECTION EVERY 6 HOURS
Status: DISCONTINUED | OUTPATIENT
Start: 2024-07-22 | End: 2024-07-29 | Stop reason: HOSPADM

## 2024-07-22 RX ORDER — POTASSIUM CHLORIDE 20 MEQ/1
60 TABLET, EXTENDED RELEASE ORAL ONCE
Status: COMPLETED | OUTPATIENT
Start: 2024-07-22 | End: 2024-07-22

## 2024-07-22 RX ORDER — SODIUM CHLORIDE 0.9 % (FLUSH) 0.9 %
10 SYRINGE (ML) INJECTION
Status: DISCONTINUED | OUTPATIENT
Start: 2024-07-22 | End: 2024-07-29 | Stop reason: HOSPADM

## 2024-07-22 RX ORDER — POTASSIUM CHLORIDE 29.8 MG/ML
40 INJECTION INTRAVENOUS ONCE
Status: COMPLETED | OUTPATIENT
Start: 2024-07-22 | End: 2024-07-22

## 2024-07-22 RX ADMIN — PANTOPRAZOLE SODIUM 40 MG: 40 TABLET, DELAYED RELEASE ORAL at 08:07

## 2024-07-22 RX ADMIN — ALBUTEROL SULFATE 2 PUFF: 108 INHALANT RESPIRATORY (INHALATION) at 07:07

## 2024-07-22 RX ADMIN — WARFARIN SODIUM 5 MG: 5 TABLET ORAL at 05:07

## 2024-07-22 RX ADMIN — POTASSIUM CHLORIDE 60 MEQ: 1500 TABLET, EXTENDED RELEASE ORAL at 08:07

## 2024-07-22 RX ADMIN — FLUTICASONE PROPIONATE AND SALMETEROL 1 PUFF: 50; 250 POWDER RESPIRATORY (INHALATION) at 11:07

## 2024-07-22 RX ADMIN — FLUTICASONE PROPIONATE AND SALMETEROL 1 PUFF: 50; 250 POWDER RESPIRATORY (INHALATION) at 07:07

## 2024-07-22 RX ADMIN — AMIODARONE HYDROCHLORIDE 400 MG: 200 TABLET ORAL at 10:07

## 2024-07-22 RX ADMIN — CHLOROTHIAZIDE SODIUM 250 MG: 500 INJECTION, POWDER, LYOPHILIZED, FOR SOLUTION INTRAVENOUS at 02:07

## 2024-07-22 RX ADMIN — POTASSIUM CHLORIDE 40 MEQ: 29.8 INJECTION, SOLUTION INTRAVENOUS at 10:07

## 2024-07-22 RX ADMIN — FUROSEMIDE 40 MG/HR: 10 INJECTION, SOLUTION INTRAMUSCULAR; INTRAVENOUS at 02:07

## 2024-07-22 RX ADMIN — ALBUTEROL SULFATE 2 PUFF: 108 INHALANT RESPIRATORY (INHALATION) at 01:07

## 2024-07-22 RX ADMIN — QUETIAPINE FUMARATE 50 MG: 25 TABLET ORAL at 10:07

## 2024-07-22 RX ADMIN — AMIODARONE HYDROCHLORIDE 400 MG: 200 TABLET ORAL at 08:07

## 2024-07-22 RX ADMIN — ALLOPURINOL 300 MG: 100 TABLET ORAL at 08:07

## 2024-07-22 RX ADMIN — SENNOSIDES AND DOCUSATE SODIUM 1 TABLET: 50; 8.6 TABLET ORAL at 08:07

## 2024-07-22 RX ADMIN — MAGNESIUM SULFATE HEPTAHYDRATE 1 G: 500 INJECTION, SOLUTION INTRAMUSCULAR; INTRAVENOUS at 07:07

## 2024-07-22 RX ADMIN — MAGNESIUM SULFATE HEPTAHYDRATE 2 G: 40 INJECTION, SOLUTION INTRAVENOUS at 10:07

## 2024-07-22 NOTE — PROGRESS NOTES
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    Pertinent Recent/Prior History:  - Had RHC in 1/2024 with notably elevated PA pressures, severe pulm HTN with normal to mildly elevated left sided filling pressure, moderately elevated right sided filling pressure, mod to severe TR  - Follows outpt with pulm Dr. [facility] at [facility] for many years - last visit 6/12/24  - Follows outpt with heart transplant Dr. Martínez - last visit 6//24  - Follows outpt with nephro Dr. Sotelo - last visit 6/19/24  - Follows outpt with PCP Dr. Escalona for many years   - Has had chronic pulm HTN and cardiopulmonary complications since at least 2008 - Legacy Documents reviewed as well  - Prior MRI in 9/2012 did show some abnormal edema around the carpal bones with small amount of fluid within radiocarpal and intercarpal joints - very non specific findings but could possible be seen with inflammatory arthritis too    Current Hospitalization:  - Initially presented with worsening dyspnea and BLE edema x 2 weeks  - Has had multiple similar admissions over past few months for decompensated HF  - Pulmonology initiated CTD workup during this hospitalization, which has resulted in high positive RF and a +COURTNEY so far (other labs pending)  - They feel he has some parenchymal abnormalities on CT (could be cysts) and some  "mosaicism (which could be air trapping or related to known PH) - these are not typical of RA-ILD but there is still concern for CTD related ILD per pulm     Rheumatology is consulted for "concern for pulmonary HTN 2/2 to RA."    On evaluation, pt endorses the above long standing history of pulmonary disease. He has never previously been dx with an inflammatory arthritis as far as he can recall. Pt denies any joint pains, stiffness, or swelling symptoms. No h/o skin rashes (apart from venous stasis dermatitis changes), Raynaud's, patchy alopecia, uveitis/painful red problems, oral/nasal ulcers, GI changes, urinary changes. Has had respiratory and exertional symptoms along with lower extremity edema in setting of known cardiopulmonary disease.    Family hx: possible OA in grandfather, no other known autoimmune rheumatologic disease hx  Social hx: non smoker, rare alcohol, no other drug use    Subjective:  No overnight events. Pt on high-flow NC. States he feels the same, no change in his breathing.      Past Medical History:   Diagnosis Date    Arthritis     CHF (congestive heart failure)     Diastolic dysfunction    Cor pulmonale 11/05/2012    Gallstones     GERD (gastroesophageal reflux disease)     Hypertension     Morbid obesity 11/05/2012    Obesity hypoventilation syndrome     On home oxygen therapy 03/07/2014    MALLIKA (obstructive sleep apnea)     BPAP 16/11 with 3 L at night (continuous O2).    Paroxysmal atrial fibrillation     PFO (patent foramen ovale) 11/05/2012    status post closure    Pickwickian syndrome 11/05/2012    Pulmonary hypertension        Past Surgical History:   Procedure Laterality Date    CHOLECYSTECTOMY      RIGHT HEART CATHETERIZATION Right 03/22/2022    Procedure: INSERTION, CATHETER, RIGHT HEART;  Surgeon: Tony Martínez MD;  Location: [facility] CATH LAB;  Service: Cardiology;  Laterality: Right;    RIGHT HEART CATHETERIZATION Right 01/31/2024    Procedure: INSERTION, CATHETER, RIGHT HEART;  " "Surgeon: Sheir Ritter MD;  Location: [facility] CATH LAB;  Service: Cardiology;  Laterality: Right;    UPPER GASTROINTESTINAL ENDOSCOPY      2-3 years ago       Immunization History   Administered Date(s) Administered    COVID-19, MRNA, LN-S, PF (MODERNA FULL 0.5 ML DOSE) 09/30/2022    COVID-19, MRNA, LN-S, PF (Pfizer) (Purple Cap) 03/09/2021, 03/30/2021, 10/18/2021    COVID-19, mRNA, LNP-S, bivalent booster, PF (PFIZER OMICRON) 09/30/2022    Influenza 11/07/2018, 04/09/2019, 09/18/2020    Influenza (Flumist) - Quadrivalent - Intranasal *Preferred* (2-49 years old) 10/16/2014    Influenza - Quadrivalent 10/16/2014    Influenza - Quadrivalent - MDCK - PF 10/31/2018    Influenza - Quadrivalent - PF *Preferred* (6 months and older) 10/14/2015, 10/27/2016, 10/23/2017, 10/24/2019, 09/16/2020, 11/16/2021, 09/30/2022, 12/15/2023    Influenza - Trivalent (ADULT) 11/06/2014    Influenza - Trivalent - PF (ADULT) 11/06/2014, 10/14/2015, 10/27/2016, 10/23/2017, 10/24/2019, 09/16/2020, 11/16/2021    Pneumococcal Polysaccharide - 23 Valent 10/23/2017    Varicella 11/06/2014       Review of patient's allergies indicates:   Allergen Reactions    Lipitor [atorvastatin] Other (See Comments)     "it makes my legs feel like jelly"  Other reaction(s): causes legs to hurt     Current Facility-Administered Medications   Medication Frequency    0.9%  NaCl infusion PRN    0.9%  NaCl infusion PRN    acetaminophen tablet 650 mg Q6H PRN    acetaZOLAMIDE tablet 500 mg BID    albuterol inhaler 2 puff Q4H PRN    albuterol inhaler 2 puff Q6H    allopurinoL tablet 300 mg Daily    aluminum & magnesium hydroxide-simethicone 400-400-40 mg/5 mL suspension 30 mL Q6H PRN    amiodarone tablet 400 mg BID    Followed by    [START ON 7/27/2024] amiodarone tablet 200 mg Daily    DOBUtamine 1000 mg in D5W 250 mL infusion Continuous    fluticasone-salmeterol 250-50 mcg/dose diskus inhaler 1 puff BID    furosemide (Lasix) 500 mg in 50 mL infusion (conc: 10 mg/mL) " Continuous    glycerin adult suppository 1 suppository Daily PRN    melatonin tablet 6 mg Nightly PRN    mupirocin 2 % ointment BID    pantoprazole EC tablet 40 mg Daily    quetiapine split tablet 50 mg QHS    senna-docusate 8.6-50 mg per tablet 1 tablet Daily    sodium chloride 0.9% flush 10 mL PRN    warfarin (COUMADIN) tablet 5 mg Daily     Family History       Problem Relation (Age of Onset)    Arthritis Mother, Maternal Grandmother, Maternal Grandfather    Asthma Mother, Sister, Maternal Grandmother    Breast cancer Paternal Grandmother    Diabetes Maternal Grandmother    Down syndrome Brother    Gout Brother    Hypertension Father, Maternal Grandmother, Maternal Grandfather, Paternal Grandfather          Tobacco Use    Smoking status: Never    Smokeless tobacco: Never   Substance and Sexual Activity    Alcohol use: Yes     Comment: holidays  rare    Drug use: No    Sexual activity: Not Currently     Partners: Female     Objective:     Vital Signs (Most Recent):  Temp: 97.4 °F (36.3 °C) (07/17/24 1100)  Pulse: 102 (07/17/24 1355)  Resp: 20 (07/17/24 1355)  BP: (!) 101/59 (07/17/24 1100)  SpO2: (!) 93 % (07/17/24 1355) Vital Signs (24h Range):  Temp:  [96.9 °F (36.1 °C)-98 °F (36.7 °C)] 97.4 °F (36.3 °C)  Pulse:  [] 102  Resp:  [12-33] 20  SpO2:  [61 %-96 %] 93 %  BP: ()/(58-77) 101/59     Weight: 103 kg (227 lb 1.2 oz) (07/17/24 0318)  Body mass index is 37.79 kg/m².  Body surface area is 2.17 meters squared.      Intake/Output Summary (Last 24 hours) at 7/17/2024 1455  Last data filed at 7/17/2024 1200  Gross per 24 hour   Intake 1794.02 ml   Output 2775 ml   Net -980.98 ml         Physical Exam   Constitutional: He is oriented to person, place, and time. He appears well-developed, well-nourished and obese. No distress. He appears ill.   HENT:   Head: Normocephalic and atraumatic.   Right Ear: External ear normal.   Left Ear: External ear normal.   Nose: Nose normal. No nasal congestion.    Mouth/Throat: Mucous membranes are moist. Oropharynx is clear.   Eyes: Conjunctivae are normal.   Cardiovascular: Normal rate and regular rhythm.   Pulmonary/Chest:   Pulmonary Comments: On high leo O2 on f/u eval. Diminished breath sounds bilaterally, some basilar crackles present.  Abdominal: Soft. He exhibits no distension. There is no abdominal tenderness.   Musculoskeletal:         General: No tenderness. Normal range of motion.      Cervical back: Normal range of motion and neck supple.      Right lower leg: Edema present.      Left lower leg: Edema present.      Comments: No acute synovitis in any of the small or large joints   Neurological: He is alert and oriented to person, place, and time.   Skin: Skin is warm and dry.   BLE with stasis dermatitis changes   Psychiatric: His behavior is normal. Mood normal.   Vitals reviewed.       Significant Labs:  All pertinent lab results from the last 24 hours have been reviewed.    Significant Imaging:  Imaging results within the past 24 hours have been reviewed.  Assessment/Plan:     Cardiac/Vascular  Pulmonary hypertension  - Pt with long standing severe end stage Group 2-3 pulmonary hypertension, since around 2008  - Has followed with pulm Dr. Ogden for many years  - Rheumatology consulted for concern of possible RA and associated pulm HTN  - Based on initial history and physical exam - pt without clinically evident RA joint disease. Bilateral hand x-ray unremarkable, no inflammation or erosions  - Typically, for RA to indirectly cause pulm HTN, would be in setting of ILD or RA-vasculitis (RA doesn't alone/directly cause PAH)  - Scleroderma could possibly cause PAH, but pt with no sign of SSc based on history and physical exam  - SSA and SSB negative w/ no sicca symptoms to support Sjogren's as possible cause of ILD  - RF noted to be positive on this admission, CCP negative, COURTNEY 1:640, sed rate >120, CRP 21. COURTNEY profile negative. Complements wnl. Hepatitis  B and C negative  - RF as a general antibody to IgG- could be positive in setting of prior infections/disease or a paraproteinemia    Plan/Recommendations:  - At this point, pt is with end stage disease  - There is no clinically evident autoimmune rheumatologic process, but will complete lab workup to further evaluate  - No treatment recommendations at this time  - Pending: Myomarker panel, Th/To, scleroderma abs, APLS serologies (except for lupus anticoagulant due to being on warfarin)          Laura Quigley MD  Rheumatology  [facility] - Cardiac Int[facility]

## 2024-07-22 NOTE — PROGRESS NOTES
[facility] - Cardiac Int[facility]  Palliative Medicine  Progress Note    Patient Name: Yong Mcintyre  MRN: [id number]  Admission Date: 7/4/2024  Hospital Length of Stay: 18 days  Code Status: Full Code   Attending Provider: Rossy Leary MD  Consulting Provider: Torrie Pretty MD  Primary Care Physician: Gianni Escalona MD  Principal Problem:Right ventricular dysfunction    Patient information was obtained from patient and primary team.      Assessment/Plan:     Palliative Care  Palliative care encounter  Impression: Pt is a 49 y/o male with severe RV failure 2/2 Pulmonary HTN.  Patient admitted almost 3 weeks ago for diuresis but has had worsening respiratory failure requiring escalating oxygen requirements during his hospital stay    Pulm HTN/ILD/paroxysmal a-fib/atrial flutter/acute on chronic right sides heart failure/acute hypoxic resp failure  -management per primary team and other consultants     Code status: Full     Surrogate decision maker: Has HCPOA document naming father. In meeting today he said he would want his sister Paula to be his surrogate decision maker.  Patient confirmed again today that he would like to go live with his sister in [place] and that she is planning on taking care of him.    GOC/ACP  7/22/24  Patient feeling very hopeful about the future as he has noted improvements in his strength and function over the past couple of days, stating he was able to walk around for the 1st time.  Also he has had a decrease in his oxygen requirement from 60 to 40 L. he is insistent that he will leave the hospital, make it to [place] where he plans to live with his family.  He reports feeling like he has in a skilled nursing here and states that he is eager to be discharged but does not want to be discharged until he is medically ready.  He does not feel like he is at the end of his life.  He is still hopeful to returned to working out at the gym, driving his new car.  When asked where he would  like to be at the very end of his life, patient states that he would prefer to be in a hospital setting as he feels like he had get the best care there.  In terms of what kind of treatment he would want to the end of his life, he does not want to make any of those decisions right now and would want to defer to his family and their best judgment when that time comes.  7/19/24  -Met with patient at bedside. Introduced palliative medicine. He did not remember prior conversations with CNS [employer]. Patient was very surprised to hear he was on 60L. After much explaining and reinforcing he was able to understand this is much more than his home 3-4L . He said he wished he would have known how bad things were. Said he was expecting to be discharged next week to drive his new car. His plan was to move to [place] to be closer to family. Explained that unfortunately the amount of O2 he is on can only be done in the hospital. Began discussion of options moving forward - continuing what we are doing here in the hospital until he no longer finds it acceptable or decompensates further. Other option would be weaning it off with comfort meds in which he is likely to die here (did not go into detail about this). Pt says he feels like his parents would want to keep him alive forever even if on machines. When asked how he feels about this he said he is just hoping this doesn't actually happen.    Prognosis:  While I do believe that patient is on a dying trajectory, as supported by his frequent hospitalizations and decline in his overall function, it does seem possible that he will recover from this acute episode.      Summary/recommendations:  -Goals:  Life prolongation with no clear limits on invasive therapies at this point in time; patient's ultimate goal is to get to [place] to live with his family.  -code status:  Full  -palliative care  is engaged in this case.  We need to determine what kind of oxygen requirement  would be allowed either on a flight or with vehicle travel to [place] so that we can present the patient with a concrete goal and will help determine whether travel to [place] is a realistic option for him in the near future  -would consider LTAC placement for continue weaning of high-flow and rehab/PT/OT in the meantime  -Pt has asked that primary team reach out to his sister to explain dx and prognosis.         I will follow-up with patient. Please contact us if you have any additional questions.    Subjective:     Chief Complaint:   Chief Complaint   Patient presents with    Leg Swelling     On home oxygen 3l, gave self 3 puffs albuterol in triage, said got sob with walking        HPI:   Pt is a 48 y.o. male with severe pulmonary HTN (Group 2-3, on 3L home O2, diagnosed prior to 2015, follows with Dr. Child at [facility], MALLIKA, Obesity hypoventilation syndrome, HFpEF, Paroxysmal AFib (on Coumadin), BMI > 35, HTN who presents for worsening shortness of breath and lower extremity swelling over the past 2 weeks. This is his 5th admission this year. He reports compliance with his medications. His diuretic regimen was adjusted to the following; Torsemide 60 mg twice daily and metolazone 2.5 on M/WF/Sunday. Clinically reports NYHA class III symptoms. He reports his dry weight is 223lbs and is currently weighing in at 245lbs. He uses 3L of O2 at home and is currently on high flow 35L at 45%     Hospital Course:  No notes on file    Interval History:   -pt weaned down from 60L to 40L over past few days  -reports that he was able to walk around the unit for the first time this weekend since hospitalization    Past Medical History:   Diagnosis Date    Arthritis     CHF (congestive heart failure)     Diastolic dysfunction    Cor pulmonale 11/05/2012    Gallstones     GERD (gastroesophageal reflux disease)     Hypertension     Morbid obesity 11/05/2012    Obesity hypoventilation syndrome     On home oxygen therapy 03/07/2014     "MALLIKA (obstructive sleep apnea)     BPAP 16/11 with 3 L at night (continuous O2).    Paroxysmal atrial fibrillation     PFO (patent foramen ovale) 11/05/2012    status post closure    Pickwickian syndrome 11/05/2012    Pulmonary hypertension        Past Surgical History:   Procedure Laterality Date    CHOLECYSTECTOMY      RIGHT HEART CATHETERIZATION Right 03/22/2022    Procedure: INSERTION, CATHETER, RIGHT HEART;  Surgeon: Tony Martínez MD;  Location: [facility] CATH LAB;  Service: Cardiology;  Laterality: Right;    RIGHT HEART CATHETERIZATION Right 01/31/2024    Procedure: INSERTION, CATHETER, RIGHT HEART;  Surgeon: Sheri Ritter MD;  Location: [facility] CATH LAB;  Service: Cardiology;  Laterality: Right;    UPPER GASTROINTESTINAL ENDOSCOPY      2-3 years ago       Review of patient's allergies indicates:   Allergen Reactions    Lipitor [atorvastatin] Other (See Comments)     "it makes my legs feel like jelly"  Other reaction(s): causes legs to hurt       Medications:  Continuous Infusions:   0.9% NaCl   Intravenous Continuous        furosemide (Lasix) 500 mg in 50 mL infusion (conc: 10 mg/mL)  40 mg/hr Intravenous Continuous 4 mL/hr at 07/22/24 1429 40 mg/hr at 07/22/24 1429     Scheduled Meds:   albuterol  2 puff Inhalation Q6H    allopurinoL  300 mg Oral Daily    amiodarone  400 mg Oral BID    Followed by    [START ON 7/27/2024] amiodarone  200 mg Oral Daily    chlorothiazide (DIURIL) 250 mg in D5W 50 mL IVPB  250 mg Intravenous Once    fluticasone-salmeterol 250-50 mcg/dose  1 puff Inhalation BID    pantoprazole  40 mg Oral Daily    QUEtiapine  50 mg Oral QHS    senna-docusate 8.6-50 mg  1 tablet Oral Daily    sodium chloride 0.9%  10 mL Intravenous Q6H    warfarin  5 mg Oral Once per day on Sunday Monday Wednesday Friday Saturday     PRN Meds:  Current Facility-Administered Medications:     0.9% NaCl, , Intravenous, PRN    0.9% NaCl, , Intravenous, PRN    acetaminophen, 650 mg, Oral, Q6H PRN    albuterol, 2 puff, " Inhalation, Q4H PRN    aluminum & magnesium hydroxide-simethicone, 30 mL, Oral, Q6H PRN    glycerin adult, 1 suppository, Rectal, Daily PRN    melatonin, 6 mg, Oral, Nightly PRN    sodium chloride 0.9%, 10 mL, Intravenous, PRN    Flushing PICC/Midline Protocol, , , Until Discontinued **AND** sodium chloride 0.9%, 10 mL, Intravenous, Q6H **AND** sodium chloride 0.9%, 10 mL, Intravenous, PRN    Family History       Problem Relation (Age of Onset)    Arthritis Mother, Maternal Grandmother, Maternal Grandfather    Asthma Mother, Sister, Maternal Grandmother    Breast cancer Paternal Grandmother    Diabetes Maternal Grandmother    Down syndrome Brother    Gout Brother    Hypertension Father, Maternal Grandmother, Maternal Grandfather, Paternal Grandfather          Tobacco Use    Smoking status: Never    Smokeless tobacco: Never   Substance and Sexual Activity    Alcohol use: Yes     Comment: holidays  rare    Drug use: No    Sexual activity: Not Currently     Partners: Female       Review of Systems  Objective:     Vital Signs (Most Recent):  Temp: 98.1 °F (36.7 °C) (07/22/24 0705)  Pulse: 80 (07/22/24 1350)  Resp: (!) 26 (07/22/24 1350)  BP: (!) 85/50 (07/22/24 1205)  SpO2: (!) 87 % (07/22/24 1350) Vital Signs (24h Range):  Temp:  [97.2 °F (36.2 °C)-98.5 °F (36.9 °C)] 98.1 °F (36.7 °C)  Pulse:  [80-96] 80  Resp:  [11-40] 26  SpO2:  [77 %-97 %] 87 %  BP: ()/(50-65) 85/50     Weight: 97 kg (213 lb 13.5 oz)  Body mass index is 37.42 kg/m².       Physical Exam  Vitals and nursing note reviewed.   Constitutional:       Interventions: Nasal cannula in place.   HENT:      Head: Normocephalic and atraumatic.   Cardiovascular:      Rate and Rhythm: Normal rate.   Pulmonary:      Effort: Pulmonary effort is normal. No respiratory distress.      Comments: Comfort flow on at 60 L 64 %  Abdominal:      General: There is distension.   Skin:     General: Skin is warm and dry.   Neurological:      General: No focal deficit  present.      Mental Status: He is alert and oriented to person, place, and time.   Psychiatric:         Behavior: Behavior is cooperative.            Review of Symptoms      Symptom Assessment (ESAS 0-10 Scale)  Pain:  0  Dyspnea:  0  Anxiety:  0  Nausea:  0  Depression:  0  Anorexia:  0  Fatigue:  0  Insomnia:  0  Restlessness:  0  Agitation:  0         Performance Status:  70    Living Arrangements:  Lives alone    Psychosocial/Cultural:   See Palliative Psychosocial Note: No  Pt lives alone, no adult children. Pt's Dad lives in [facility] and pt's mother lives in [place]. Per pt, they are both aware of his medical issues they get along.   **Primary  to Follow**  Palliative Care  Consult: Yes        Advance Care Planning  Advance Directives:   Living Will: No    LaPOST: No    Do Not Resuscitate Status: No    Medical Power of : Yes    Goals of Care: What is most important right now is to focus on extending life as long as possible, even it it means sacrificing quality, curative/life-prolongation (regardless of treatment burdens). Accordingly, we have decided that the best plan to meet the patient's goals includes continuing with treatment.         Significant Labs: All pertinent labs within the past 24 hours have been reviewed.  CBC:   Recent Labs   Lab 07/22/24 0421   WBC 5.73   HGB 12.2*   HCT 43.2   MCV 87        BMP:  Recent Labs   Lab 07/22/24 0421   GLU 87      K 3.0*   CL 92*   CO2 35*   BUN 48*   CREATININE 1.9*   CALCIUM 8.9   MG 1.7     LFT:  Lab Results   Component Value Date    AST 16 07/22/2024    ALKPHOS 116 07/22/2024    BILITOT 0.9 07/22/2024     Albumin:   Albumin   Date Value Ref Range Status   07/22/2024 2.4 (L) 3.5 - 5.2 g/dL Final     Protein:   Total Protein   Date Value Ref Range Status   07/22/2024 7.3 6.0 - 8.4 g/dL Final     Lactic acid:   Lab Results   Component Value Date    LACTATE 1.1 07/05/2024    LACTATE 1.1 04/22/2024       Significant  Imaging: I have reviewed all pertinent imaging results/findings within the past 24 hours.      CT chest 7/1Impression:     Similar appearance of diffuse ground-glass opacity, suggestive of pulmonary edema.     Dilatation of the main pulmonary artery compatible with pulmonary hypertension.     Cardiomegaly.   8/24      Torrie Pretty MD  Palliative Medicine  [facility] - Cardiac Intensive Care

## 2024-07-22 NOTE — PT/OT/SLP EVAL
"Occupational Therapy   Co-Evaluation  Co-treatment performed due to patient's multiple deficits requiring two skilled therapists to appropriately and safely assess patient's strength and endurance while facilitating functional tasks in addition to accommodating for patient's activity tolerance.      Name: Yong Mcintyre  MRN: [id number]  Admitting Diagnosis: Right ventricular dysfunction  Recent Surgery: Procedure(s) (LRB):  Cardioversion or Defibrillation (N/A) 6 Days Post-Op    Recommendations:     Discharge Recommendations: High Intensity Therapy  Discharge Equipment Recommendations:  other (see comments) (TBD pending progress)  Barriers to discharge:  None    Assessment:     Yong Mcintyre is a 48 y.o. male with a medical diagnosis of Right ventricular dysfunction.  He presents with the following performance deficits affecting function: impaired endurance, impaired self care skills, impaired functional mobility, decreased safety awareness, impaired cardiopulmonary response to activity, gait instability, impaired balance.  Pt agreeable to therapy and tolerated well. He completed all functional activity and mobility with CGA-SBA this day. Pt reported feeling "unsteady" after fx'l mobility with BP obtained. 88/50 mmHg in standing followed by 92/55 mmHg in sitting. Pt would continue to benefit from skilled OT services to maximize functional independence with ADLs and functional mobility, reduce caregiver burden, and facilitate safe discharge in the least restrictive environment. Patient has demonstrated sufficient progression to warrant high intensity therapy evidenced by objectives noted below.       Rehab Prognosis: Good; patient would benefit from acute skilled OT services to address these deficits and reach maximum level of function.       Plan:     Patient to be seen 4 x/week to address the above listed problems via self-care/home management, therapeutic activities, therapeutic exercises, neuromuscular " re-education  Plan of Care Expires: 08/21/24  Plan of Care Reviewed with: patient    Subjective     Chief Complaint: none stated  Patient/Family Comments/goals: to return to PLOF    Occupational Profile:  Living Environment: Pt lives alone but plans to d/c to stay with family in [place] in [place] with threshold. Bedroom/bathroom on 2nd floor with 10 ADY and (R) HR. T/s c shower chair and grab bar  Previous level of function: Independent  Roles and Routines: (+) drives  Equipment Used at Home: grab bar, shower chair  Assistance upon Discharge: family    Pain/Comfort:  Pain Rating 1: 0/10  Pain Rating Post-Intervention 1: 0/10    Patients cultural, spiritual, Moravian conflicts given the current situation: no    Objective:     Communicated with: RN prior to session.  Patient found up in chair with blood pressure cuff, central line, oxygen, peripheral IV, pulse ox (continuous), telemetry upon OT entry to room.    General Precautions: Standard, fall  Orthopedic Precautions: N/A  Braces: N/A  Respiratory Status:  AirVo 40L 50%    Occupational Performance:    Bed Mobility:    Not assessed- pt met/left sitting on bedside chair    Functional Mobility/Transfers:  Patient completed Sit <> Stand Transfer with stand by assistance  with  no assistive device   Functional Mobility: Pt engaged in functional mobility to simulate household/community distances 36 ft with CGA-SBA and no AD in order to maximize functional endurance and standing balance required for engagement in occupations of choice   No LOB or SOB noted  Limited fx'l mobility allowed 2/2 Airvo    Activities of Daily Living:  Grooming: stand by assistance to perform oral and face care standing from chair with table tray in front. Pt tolerated static standing with dynamic reaching for ~5 minutes    Cognitive/Visual Perceptual:  Cognitive/Psychosocial Skills:     -       Oriented to: Person, Place, Time, and Situation   -       Follows Commands/attention:Follows  two-step commands  -       Safety awareness/insight to disability: impaired   -       Mood/Affect/Coping skills/emotional control: Cooperative    Physical Exam:  Sensation:    -       Intact  Upper Extremity Range of Motion:     -       Right Upper Extremity: WFL  -       Left Upper Extremity: WFL  Upper Extremity Strength:    -       Right Upper Extremity: WFL  -       Left Upper Extremity: WFL  Fine Motor Coordination:    -       Intact    AMPAC 6 Click ADL:  AMPAC Total Score: 19    Treatment & Education:  Provided education regarding role of OT, POC, & discharge recommendations with pt verbalizing understanding.  Pt had no further questions & when asked whether there were any concerns pt reported none.     Patient left up in chair with all lines intact, call button in reach, and RN notified    GOALS:   Multidisciplinary Problems       Occupational Therapy Goals          Problem: Occupational Therapy    Goal Priority Disciplines Outcome Interventions   Occupational Therapy Goal     OT, PT/OT Progressing    Description: Goals to be met by: 8/21/24     Patient will increase functional independence with ADLs by performing:    LE Dressing with Modified [place].  Grooming while standing at sink with Modified [place].  Toileting from toilet/bedside commode with Modified [place] for hygiene and clothing management.   Step transfer with Modified [place]  Toilet transfer to toilet/bedside commode with Modified [place].                         History:     Past Medical History:   Diagnosis Date    Arthritis     CHF (congestive heart failure)     Diastolic dysfunction    Cor pulmonale 11/05/2012    Gallstones     GERD (gastroesophageal reflux disease)     Hypertension     Morbid obesity 11/05/2012    Obesity hypoventilation syndrome     On home oxygen therapy 03/07/2014    MALLIKA (obstructive sleep apnea)     BPAP 16/11 with 3 L at night (continuous O2).    Paroxysmal atrial fibrillation     PFO  (patent foramen ovale) 11/05/2012    status post closure    Pickwickian syndrome 11/05/2012    Pulmonary hypertension          Past Surgical History:   Procedure Laterality Date    CHOLECYSTECTOMY      RIGHT HEART CATHETERIZATION Right 03/22/2022    Procedure: INSERTION, CATHETER, RIGHT HEART;  Surgeon: Tony Martínez MD;  Location: [facility] CATH LAB;  Service: Cardiology;  Laterality: Right;    RIGHT HEART CATHETERIZATION Right 01/31/2024    Procedure: INSERTION, CATHETER, RIGHT HEART;  Surgeon: Sheri Rittre MD;  Location: [facility] CATH LAB;  Service: Cardiology;  Laterality: Right;    UPPER GASTROINTESTINAL ENDOSCOPY      2-3 years ago       Time Tracking:     OT Date of Treatment: 07/22/24  OT Start Time: 0958  OT Stop Time: 1019  OT Total Time (min): 21 min    Billable Minutes:Evaluation 12  Self Care/Home Management 9    7/22/2024

## 2024-07-22 NOTE — PLAN OF CARE
Problem: Physical Therapy  Goal: Physical Therapy Goal  Description: Goals to be met by: 2024     Patient will increase functional independence with mobility by performin. Supine to sit with [place]  2. Sit to stand transfer with [place]  3. Gait  x 150 feet with Supervision using No Assistive Device.   4. Ascend/descend 10 stair with right Handrails Supervision using No Assistive Device.     Outcome: Progressing     Pt evaluated and appropriate goals set.

## 2024-07-22 NOTE — CONSULTS
Double lumen PICC to Right Basilic vein.  40 cm in length, 33 cm arm circumference and 0 cm exposed.   Lot # [id number].

## 2024-07-22 NOTE — ASSESSMENT & PLAN NOTE
Acute on chronic. - NYHA class III symptoms with recurrent admissions.  - He was seen by [facility] outpatient 6/2024 who state patient was feeling better on new diuretic regimen however worried about the frequency of use of metolazone and his worsening kidney function.    - Admitted with CXR with cardiomegaly and pulmonary vascular congestion, suggestive of pulmonary edema secondary to CHF. BNP elevated at 800. Creatinine worse at 3.1 on admission.  - Given his recurrent HF admissions and most recent hemodynamics, [facility] believes his overall prognosis is poor and recommended palliative care consult. Palliative care evaluated patient. Patient wishes to continue full measures at this time.     - 2 gram sodium restriction and 1500cc fluid restriction.  - Encourage physical activity with graded exercise program.  - Acetazolamide PO for signs of contraction alkalosis   - lasix gtt   - Continue dobutamine gtt; weaned off 7/22  - amio PO  - Diuril as needed  - Monitor lytes BID; replete as indicated

## 2024-07-22 NOTE — ASSESSMENT & PLAN NOTE
- patient not amenable to palliative options   - spoke to sister 7/22: stated that we will continue to try to wean HFNC and lasix to an acceptable amount to discharge on to [facility], and if time limited trial is unsuccessful then will need to discharge the patient on hospice to [facility]. The patient's sister states she will talk to the patient about considering hospice as an option. Planning for potential family meeting with sister, patient, and pall care on 7/23 at 2 PM

## 2024-07-22 NOTE — PROGRESS NOTES
[facility] - Cardiac Intensive Care  Pulmonology  Progress Note    Patient Name: Yong Mcintyre  MRN: [id number]  Admission Date: 7/4/2024  Hospital Length of Stay: 18 days  Code Status: Full Code  Attending Provider: Rossy Leary MD  Primary Care Provider: Gianni Escalona MD   Principal Problem: Right ventricular dysfunction    Subjective:     Interval History: NAEON. Satting 95-99% on HFNC at 40 L/ 50%.     Objective:     Vital Signs (Most Recent):  Temp: 98.1 °F (36.7 °C) (07/22/24 0705)  Pulse: 82 (07/22/24 0905)  Resp: (!) 22 (07/22/24 0905)  BP: (!) 103/56 (07/22/24 0905)  SpO2: 97 % (07/22/24 0905) Vital Signs (24h Range):  Temp:  [97.2 °F (36.2 °C)-98.5 °F (36.9 °C)] 98.1 °F (36.7 °C)  Pulse:  [80-97] 82  Resp:  [11-40] 22  SpO2:  [86 %-98 %] 97 %  BP: ()/(52-65) 103/56     Weight: 97.5 kg (214 lb 15.2 oz)  Body mass index is 35.77 kg/m².      Intake/Output Summary (Last 24 hours) at 7/22/2024 0939  Last data filed at 7/22/2024 0905  Gross per 24 hour   Intake 1654.98 ml   Output 2675 ml   Net -1020.02 ml        Physical Exam  Vitals and nursing note reviewed.   Constitutional:       General: He is not in acute distress.     Appearance: He is not toxic-appearing.      Interventions: Nasal cannula in place.   HENT:      Head: Normocephalic and atraumatic.      Nose: Congestion present.      Mouth/Throat:      Mouth: Mucous membranes are moist.   Eyes:      General: No scleral icterus.     Pupils: Pupils are equal, round, and reactive to light.   Neck:      Comments: Central line in place, dressing clean and intact  Cardiovascular:      Rate and Rhythm: Tachycardia present. Rhythm irregular.      Heart sounds: Normal heart sounds.   Pulmonary:      Effort: No respiratory distress.      Breath sounds: Rales present. No wheezing.      Comments: Bibasilar crackles.  Abdominal:      Palpations: Abdomen is soft.      Tenderness: There is no abdominal tenderness. There is no guarding.    Musculoskeletal:      Right lower leg: Edema present.      Left lower leg: Edema present.   Skin:     General: Skin is warm and dry.   Neurological:      Mental Status: He is alert and oriented to person, place, and time.   Psychiatric:         Mood and Affect: Mood normal.         Behavior: Behavior normal. Behavior is cooperative.           Review of Systems    Vents:  Oxygen Concentration (%): 60 (07/22/24 0346)    Lines/Drains/Airways       Central Venous Catheter Line  Duration             Percutaneous Central Line - Triple Lumen  07/08/24 1145 Internal Jugular Right 13 days              Peripheral Intravenous Line  Duration                  Peripheral IV - Single Lumen 07/20/24 2000 18 G Anterior;Proximal;Right Forearm 1 day                    Significant Labs:    CBC/Anemia Profile:  Recent Labs   Lab 07/21/24  0336 07/22/24  0421   WBC 5.65 5.73   HGB 12.8* 12.2*   HCT 45.2 43.2    169   MCV 87 87   RDW 22.2* 21.9*        Chemistries:  Recent Labs   Lab 07/20/24  1810 07/21/24  0336 07/22/24  0421    140 138   K 3.5 3.9 3.0*   CL 99 97 92*   CO2 30* 34* 35*   BUN 39* 44* 48*   CREATININE 1.7* 1.9* 1.9*   CALCIUM 8.5* 9.5 8.9   ALBUMIN 2.5* 2.6* 2.4*   PROT  --  7.7 7.3   BILITOT  --  1.0 0.9   ALKPHOS  --  125 116   ALT  --  8* 7*   AST  --  15 16   MG 2.1 2.2 1.7   PHOS 3.2 4.1 4.4       All pertinent labs within the past 24 hours have been reviewed.    Significant Imaging:  I have reviewed all pertinent imaging results/findings within the past 24 hours.  Assessment/Plan:     Pulmonary  Acute on chronic respiratory failure with hypoxia  Patient with a history of chronic CO2 retention, has labs with elevated bicarb for years (baseline appears upper 30's) on home NIV. Patient is adherent with home O2 and BiPAP. Per notes from [facility], there was some concern for inadequate ventilatory support at home due to worsening bicarb and Hgb, however patient has had multiple hospitalizations in the past  calendar year alone for right heart failure. Most recent Echo confirming very high pulmonary artery systolic pressure, and some RV dysfunction but not severely dilating to the point of impeding left heart function. Patient's hypercapnia is multifactorial. Baseline ventilatory defect is present (severe obstruction on prior PFTs in the [facility] system) as well as sheer ventilatory restriction from significantly enlarged heart. Patient's baseline CO2 is hard to determine, but has been in the 70's with normal PH's on arterial blood gases. Patient has been appropriately aggressively diuresed during this hospitalization and may have developed a contraction alkalosis. It's likely some aspect of this increase in CO2 over the past few days is a compensatory response to his alkalosis. Has started diamox for assisted diuresis. Overall, patient has multiple severely morbid and high mortality conditions.     - ABG on 7/17 with normal pH of 7.37 and pCO2 of 70. Patient's pCO2 is at its baseline, patient with normal pH and pCO2 in the upper 70's and lower 80's on venous blood gas. This is a sign of well compensated chronic hypercapnic respiratory failure. Patient does not need continuous BiPAP, will only need it while he is sleeping. My bigger concern is patient's oxygenation, rather than his ventilation. He is requiring a high amount of support to maintain appropriate saturations. The patient's goal O2 saturation should be 88-92% and we should utilize whatever oxygen delivery system is needed to achieve those goals, including Comfort flow / high flow nasal cannula. Hypoxemia will worsen patient's pulmonary hypertension.    - CTD workup ordered. RF has returned very high. He does appear to have parenchymal abnormalities on CT persistently with what appear to be cysts. 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 There is significant mosaicism on his CT previously which could be consistent with air  trapping (prior PFTs with significantly elevated RV-TLC ratio consistent with air trapping), however PVOD / PH can have similar changes.    Recommendations  - Would continue diuresis   - BiPAP at night and with naps, otherwise OK for high-flow nasal cannula throughout the day. Would continue to wean off HFNC  - Goal sat is 88-92%  - Continue bronchodilators   - Will follow up rheumatologic workup                 Mawadah Samad, MD  Pulmonology  [facility] - Cardiac Intensive Care

## 2024-07-22 NOTE — PLAN OF CARE
Nursing Care Plan    POC reviewed with Yong Mcintyre and family at 2000. Patient verbalized understanding. Questions and concerns addressed. No acute events overnight. Pt progressing toward goals. Will continue to monitor. See below and flowsheets for full assessment and VS info.     CODE Status: FULL    NAEON    Is this a stroke patient? no    NEURO:  Geoff Coma Scale  Best Eye Response: 4-->(E4) spontaneous  Best Motor Response: 6-->(M6) obeys commands  Best Verbal Response: 5-->(V5) oriented  [place] Coma Scale Score: 15  Assessment Qualifiers: patient not sedated/intubated  Pupil PERRLA: yes    Deficits: none  ICP:  NA  Drains: NA    RASS: Goal = 0  RASS score:  CAM-ICU: Negative    Restraints: None                       24 hr Temp:  [97.2 °F (36.2 °C)-98.5 °F (36.9 °C)]     CV:   Rhythm: normal sinus rhythm, atrial rhythm  BP goals:   SBP < 160  MAP > 65    Lines: PIV, R IJ  Gtts:  Dobuta and Lasix    PULM:     Oxygen Concentration (%): 60    Oxygen: HFNC 40L/50%; BiPAP 15/5 @ 60%  Vent:N/A  ABG: N/A  SBT/SAT:  N/A    GI/:     Diet/Nutrition Received: consistent carb/diabetic diet, low saturated fat/low cholesterol, no added salt, restrict fluids  Last Bowel Movement: 07/21/24  Voiding Characteristics: voids spontaneously without difficulty    Intake/Output Summary (Last 24 hours) at 7/22/2024 0444  Last data filed at 7/22/2024 0442  Gross per 24 hour   Intake 1632.63 ml   Output 2700 ml   Net -1067.37 ml     Unmeasured Output  Urine Occurrence: 1  Stool Occurrence: 1  Emesis Occurrence: 0  Pad Count: 0    Diet: 2g low Na   TF (Goal/Rate): N/A  Fluid Restriction: 1.5 L  Supplement: boost    [place]: urinal  I/O (prev day/total): -961/ 18.2L  Electrolytes: see below    Labs/Accuchecks:  Recent Labs   Lab 07/22/24  0421   WBC 5.73   RBC 4.95   HGB 12.2*   HCT 43.2         Recent Labs   Lab 07/21/24  0336      K 3.9   CO2 34*   CL 97   BUN 44*   CREATININE 1.9*   ALKPHOS 125   ALT 8*   AST 15  "  BILITOT 1.0      Recent Labs   Lab 07/22/24  0421   INR 2.0*    No results for input(s): "CPK", "CPKMB", "TROPONINI", "MB" in the last 168 hours.    Electrolytes: No replacement orders  Accuchecks: none  Long Acting Insulin: None  Short Acting Insulin:     Path pending: none    ID:   Tmax: 97.2  WBC: see above  Culture (Last 5 days): none  Antibiotics: none    RAD:  Last Rad: CXR 7/22    Wounds: none    PPX  BR: Senna  GI: PPI  DVT (Rx/ Mech):  SCD/ Warfarin for AF    Family: cousin    Nurses Note -- 4 Eyes    Is there altered skin present?  No    Chart reviewed and plan discussed with provider.     NURSING PLAN:   Able to titrate oxygen down on both HFNC and BiPAP.   No acute events.         "

## 2024-07-22 NOTE — ASSESSMENT & PLAN NOTE
xOn coumadin.    Lab Results   Component Value Date    INR 2.0 (H) 07/22/2024    INR 2.1 (H) 07/21/2024    INR 2.5 (H) 07/20/2024     -- Goal INR 2-3  -- Titrate warfarin daily    -- EP consulted for Aflutter, cardioverted 7/16

## 2024-07-22 NOTE — ASSESSMENT & PLAN NOTE
Patient with Hypercapnic and Hypoxic Respiratory failure which is Acute on chronic.  he is on home oxygen at 3 LPM. Supplemental oxygen was provided and noted- Oxygen Concentration (%):  [50-70] 50    Signs/symptoms of respiratory failure include- tachypnea and increased work of breathing. Contributing diagnoses includes - CHF Labs and images were reviewed. Patient Has recent ABG, which has been reviewed.    -- Trend VBG BID  -- BiPap during night and naps; okay for HFNC throughout the day per pulm  -- Goal SpO2 > 88%

## 2024-07-22 NOTE — PROCEDURES
"Yong Mcintyre is a 48 y.o. male patient.    Temp: 98.1 °F (36.7 °C) (07/22/24 0705)  Pulse: 82 (07/22/24 0905)  Resp: (!) 22 (07/22/24 0905)  BP: (!) 103/56 (07/22/24 0905)  SpO2: 97 % (07/22/24 0905)  Weight: 97.5 kg (214 lb 15.2 oz) (07/20/24 0555)  Height: 5' 5" (165.1 cm) (07/11/24 0750)    PICC  Date/Time: 7/22/2024 11:20 AM  Performed by: Zenobia Kapoor RN  Assisting provider: John Marc RN  Consent Done: Yes  Time out: Immediately prior to procedure a time out was called to verify the correct patient, procedure, equipment, support staff and site/side marked as required  Indications: med administration  Anesthesia: local infiltration  Local anesthetic: lidocaine 1% without epinephrine  Anesthetic Total (mL): 3  Description of findings: PICC  Preparation: skin prepped with ChloraPrep  Skin prep agent dried: skin prep agent completely dried prior to procedure  Sterile barriers: all five maximum sterile barriers used - cap, mask, sterile gown, sterile gloves, and large sterile sheet  Hand hygiene: hand hygiene performed prior to central venous catheter insertion  Location details: right basilic  Catheter type: double lumen  Catheter size: 5 Fr  Catheter Length: 40cm    Ultrasound guidance: yes  Vessel Caliber: medium and patent, compressibility normal  Vascular Doppler: not done  Needle advanced into vessel with real time Ultrasound guidance.  Guidewire confirmed in vessel.  Image recorded and saved.  Sterile sheath used.  no esophageal manometryNumber of attempts: 1  Post-procedure: blood return through all ports, sterile dressing applied and chlorhexidine patch  Technical procedures used: 3CG  Specimens: No  Implants: No  Assessment: placement verified by x-ray  Complications: none          Name S ED MOREL  7/22/2024    "

## 2024-07-22 NOTE — SUBJECTIVE & OBJECTIVE
Interval History: NAEON. Satting 95-99% on HFNC at 40 L/ 50%.     Objective:     Vital Signs (Most Recent):  Temp: 98.1 °F (36.7 °C) (07/22/24 0705)  Pulse: 82 (07/22/24 0905)  Resp: (!) 22 (07/22/24 0905)  BP: (!) 103/56 (07/22/24 0905)  SpO2: 97 % (07/22/24 0905) Vital Signs (24h Range):  Temp:  [97.2 °F (36.2 °C)-98.5 °F (36.9 °C)] 98.1 °F (36.7 °C)  Pulse:  [80-97] 82  Resp:  [11-40] 22  SpO2:  [86 %-98 %] 97 %  BP: ()/(52-65) 103/56     Weight: 97.5 kg (214 lb 15.2 oz)  Body mass index is 35.77 kg/m².      Intake/Output Summary (Last 24 hours) at 7/22/2024 0939  Last data filed at 7/22/2024 0905  Gross per 24 hour   Intake 1654.98 ml   Output 2675 ml   Net -1020.02 ml        Physical Exam  Vitals and nursing note reviewed.   Constitutional:       General: He is not in acute distress.     Appearance: He is not toxic-appearing.      Interventions: Nasal cannula in place.   HENT:      Head: Normocephalic and atraumatic.      Nose: Congestion present.      Mouth/Throat:      Mouth: Mucous membranes are moist.   Eyes:      General: No scleral icterus.     Pupils: Pupils are equal, round, and reactive to light.   Neck:      Comments: Central line in place, dressing clean and intact  Cardiovascular:      Rate and Rhythm: Tachycardia present. Rhythm irregular.      Heart sounds: Normal heart sounds.   Pulmonary:      Effort: No respiratory distress.      Breath sounds: Rales present. No wheezing.      Comments: Bibasilar crackles.  Abdominal:      Palpations: Abdomen is soft.      Tenderness: There is no abdominal tenderness. There is no guarding.   Musculoskeletal:      Right lower leg: Edema present.      Left lower leg: Edema present.   Skin:     General: Skin is warm and dry.   Neurological:      Mental Status: He is alert and oriented to person, place, and time.   Psychiatric:         Mood and Affect: Mood normal.         Behavior: Behavior normal. Behavior is cooperative.           Review of  Systems    Vents:  Oxygen Concentration (%): 60 (07/22/24 0346)    Lines/Drains/Airways       Central Venous Catheter Line  Duration             Percutaneous Central Line - Triple Lumen  07/08/24 1145 Internal Jugular Right 13 days              Peripheral Intravenous Line  Duration                  Peripheral IV - Single Lumen 07/20/24 2000 18 G Anterior;Proximal;Right Forearm 1 day                    Significant Labs:    CBC/Anemia Profile:  Recent Labs   Lab 07/21/24  0336 07/22/24  0421   WBC 5.65 5.73   HGB 12.8* 12.2*   HCT 45.2 43.2    169   MCV 87 87   RDW 22.2* 21.9*        Chemistries:  Recent Labs   Lab 07/20/24  1810 07/21/24  0336 07/22/24  0421    140 138   K 3.5 3.9 3.0*   CL 99 97 92*   CO2 30* 34* 35*   BUN 39* 44* 48*   CREATININE 1.7* 1.9* 1.9*   CALCIUM 8.5* 9.5 8.9   ALBUMIN 2.5* 2.6* 2.4*   PROT  --  7.7 7.3   BILITOT  --  1.0 0.9   ALKPHOS  --  125 116   ALT  --  8* 7*   AST  --  15 16   MG 2.1 2.2 1.7   PHOS 3.2 4.1 4.4       All pertinent labs within the past 24 hours have been reviewed.    Significant Imaging:  I have reviewed all pertinent imaging results/findings within the past 24 hours.

## 2024-07-22 NOTE — SUBJECTIVE & OBJECTIVE
Interval History:  weaned.     ROS  Objective:     Vital Signs (Most Recent):  Temp: 97.9 °F (36.6 °C) (07/22/24 1305)  Pulse: 81 (07/22/24 1505)  Resp: 15 (07/22/24 1505)  BP: 105/61 (07/22/24 1505)  SpO2: (!) 86 % (07/22/24 1505) Vital Signs (24h Range):  Temp:  [97.2 °F (36.2 °C)-98.1 °F (36.7 °C)] 97.9 °F (36.6 °C)  Pulse:  [80-96] 81  Resp:  [11-40] 15  SpO2:  [77 %-97 %] 86 %  BP: ()/(50-65) 105/61     Weight: 97 kg (213 lb 13.5 oz)  Body mass index is 37.42 kg/m².     SpO2: (!) 86 %         Intake/Output Summary (Last 24 hours) at 7/22/2024 1556  Last data filed at 7/22/2024 1205  Gross per 24 hour   Intake 852.75 ml   Output 2075 ml   Net -1222.25 ml       Lines/Drains/Airways       Peripherally Inserted Central Catheter Line  Duration             PICC Double Lumen 07/22/24 1120 right basilic <1 day              Central Venous Catheter Line  Duration             Percutaneous Central Line - Triple Lumen  07/08/24 1145 Internal Jugular Right 14 days              Peripheral Intravenous Line  Duration                  Peripheral IV - Single Lumen 07/20/24 2000 18 G Anterior;Proximal;Right Forearm 1 day                       Physical Exam  Vitals reviewed.   Constitutional:       General: He is not in acute distress.     Appearance: He is obese. He is ill-appearing.   Cardiovascular:      Rate and Rhythm: Normal rate and regular rhythm.   Pulmonary:      Effort: No respiratory distress.      Breath sounds: No rhonchi.   Abdominal:      General: Abdomen is flat.   Musculoskeletal:      Right lower leg: No edema.      Left lower leg: No edema.   Skin:     General: Skin is warm.   Neurological:      Mental Status: He is alert.            Significant Labs: All pertinent lab results from the last 24 hours have been reviewed.    Significant Imaging: Echocardiogram: 2D echo with color flow doppler: No results found. However, due to the size of the patient record, not all encounters were searched. Please check  Results Review for a complete set of results.

## 2024-07-22 NOTE — PT/OT/SLP EVAL
"Physical Therapy Co-Evaluation and Treatment    Patient Name:  Yong Mcintyre   MRN:  [id number]    Recommendations:     Discharge Recommendations: Low Intensity Therapy   Discharge Equipment Recommendations: none   Barriers to discharge:  Increased respiratory support required    Assessment:     Yong Mcintyre is a 48 y.o. male admitted with a medical diagnosis of Right ventricular dysfunction.  He presents with the following impairments/functional limitations: impaired endurance, impaired self care skills, impaired functional mobility, decreased safety awareness, gait instability, impaired balance, impaired cardiopulmonary response to activity. Pt agreeable to treatment, tolerated treatment fairly. Pt required supervision to standby assistance throughout mobility due to decreased safety awareness and due to unstable VS. HR increased and O2 saturation decreased during brushing teeth with pt remaining asymptomatic, stabilizing with cessation of activity prior to ambulation. After ambulation BP decreased with a MAP of 64, pt reported feeling "unsteady" and returned to a safe seated position. BP re-taken in this position, improved slightly, RN aware and present in room. Pt remains limited in functional mobility at this date due to cardiopulmonary status. Pt would benefit from continued skilled physical therapy to address impairments and progress towards prior level of function.    Rehab Prognosis: Good; patient would benefit from acute skilled PT services to address these deficits and reach maximum level of function.    Recent Surgery: Procedure(s) (LRB):  Cardioversion or Defibrillation (N/A) 6 Days Post-Op    Plan:     During this hospitalization, patient to be seen 4 x/week to address the identified rehab impairments via gait training, therapeutic activities, therapeutic exercises, neuromuscular re-education and progress toward the following goals:    Plan of Care Expires:  08/22/24    Subjective     Chief " Complaint: none reported  Patient/Family Comments/goals: to go home  Pain/Comfort:  Pain Rating 1: 0/10  Pain Rating Post-Intervention 1: 0/10    Patients cultural, spiritual, Lutheran conflicts given the current situation: no    Living Environment:  Pt plans to live with family members in a 2 story home in [place] with a 2nd floor bedroom/bath set up, 10 stairs to 2nd floor with R handrail, 1 step to enter house, with a tub shower with grab bars present.  Prior to admission, patients level of function was independent.  Equipment used at home: grab bar, shower chair.  DME owned (not currently used): none.  Upon discharge, patient will have assistance from family.    Objective:     Communicated with RN prior to session.  Patient found up in chair with telemetry, pulse ox (continuous), blood pressure cuff, central line, oxygen, peripheral IV  upon PT entry to room.    General Precautions: Standard, fall  Orthopedic Precautions:N/A   Braces: N/A  Respiratory Status: Comfort flow, flow 40 L/min, concentration 50%    Exams:  Cognitive Exam:  Patient is oriented to Person, Place, Time, and Situation  RLE ROM: WFL  RLE Strength: WFL  LLE ROM: WFL  LLE Strength: WFL    Functional Mobility:  Transfers:  Sit to Stand:  stand by assistance with no AD  Gait: ambulated 36' in room with standby assistance and no AD. Gait distance limited by airvo.  Balance: standing balance with standby assistance while performing self-care.      AM-PAC 6 CLICK MOBILITY  Total Score:18       Treatment & Education:  Pt educated on role of PT/POC and verbalized understanding.    Patient left up in chair with all lines intact, call button in reach, and RN present.    GOALS:   Multidisciplinary Problems       Physical Therapy Goals          Problem: Physical Therapy    Goal Priority Disciplines Outcome Goal Variances Interventions   Physical Therapy Goal     PT, PT/OT Progressing     Description: Goals to be met by: 08/05/2024     Patient will  increase functional independence with mobility by performin. Supine to sit with [place]  2. Sit to stand transfer with [place]  3. Gait  x 150 feet with Supervision using No Assistive Device.   4. Ascend/descend 10 stair with right Handrails Supervision using No Assistive Device.                          History:     Past Medical History:   Diagnosis Date    Arthritis     CHF (congestive heart failure)     Diastolic dysfunction    Cor pulmonale 2012    Gallstones     GERD (gastroesophageal reflux disease)     Hypertension     Morbid obesity 2012    Obesity hypoventilation syndrome     On home oxygen therapy 2014    MALLIKA (obstructive sleep apnea)     BPAP  with 3 L at night (continuous O2).    Paroxysmal atrial fibrillation     PFO (patent foramen ovale) 2012    status post closure    Pickwickian syndrome 2012    Pulmonary hypertension        Past Surgical History:   Procedure Laterality Date    CHOLECYSTECTOMY      RIGHT HEART CATHETERIZATION Right 2022    Procedure: INSERTION, CATHETER, RIGHT HEART;  Surgeon: Tony Martínez MD;  Location: [facility] CATH LAB;  Service: Cardiology;  Laterality: Right;    RIGHT HEART CATHETERIZATION Right 2024    Procedure: INSERTION, CATHETER, RIGHT HEART;  Surgeon: Sheri Ritter MD;  Location: [facility] CATH LAB;  Service: Cardiology;  Laterality: Right;    UPPER GASTROINTESTINAL ENDOSCOPY      2-3 years ago       Time Tracking:     PT Received On: 24  PT Start Time: 957     PT Stop Time: 1020  PT Total Time (min): 23 min     Billable Minutes: Evaluation 8 and Therapeutic Activity 15      2024

## 2024-07-22 NOTE — ASSESSMENT & PLAN NOTE
"Patient is identified as having Systolic (HFrEF) heart failure that is Acute on chronic. CHF is currently uncontrolled due to Continued edema of extremities. Latest ECHO performed and demonstrates- Results for orders placed during the hospital encounter of 07/04/24    Echo    Interpretation Summary    Left Ventricle: Mild global hypokinesis present. Septal flattening in diastole and systole consistent with right ventricular volume and pressure overload. There is moderately reduced systolic function with a visually estimated ejection fraction of 35 - 40%. Grade I diastolic dysfunction. Eatimated CO is 3.4 l/min    Right Ventricle: Severe right ventricular enlargement. Wall thickness is normal. Right ventricle wall motion has global hypokinesis. Systolic function is severely reduced.    Right Atrium: Right atrium is severely dilated.    Aortic Valve: The aortic valve is a trileaflet valve. There is mild aortic valve sclerosis. There is moderate annular calcification present.    Tricuspid Valve: There is moderate regurgitation with an anteromedial eccentrically directed jet.    Aorta: Aortic root is normal in size measuring 2.77 cm. Ascending aorta is normal measuring 2.51 cm.    Pulmonary Artery: There is severe pulmonary hypertension. The estimated pulmonary artery systolic pressure is 81 mmHg.    IVC/SVC: Elevated venous pressure at 15 mmHg.  . Continue Furosemide and monitor clinical status closely. Monitor on telemetry. Patient is off CHF pathway.  Monitor strict Is&Os and daily weights.  Place on fluid restriction of 1.5 L. Cardiology has been consulted. Continue to stress to patient importance of self efficacy and  on diet for CHF. Last BNP reviewed- and noted below No results for input(s): "BNP", "BNPTRIAGEBLO" in the last 168 hours.  "

## 2024-07-22 NOTE — PLAN OF CARE
CICU DAILY GOALS       A: Awake    RASS: Goal - RASS Goal: 0-->alert and calm  Actual - RASS (Carter Agitation-Sedation Scale): alert and calm   Restraint necessity:    B: Breath   SBT: Not intubated   C: Coordinate A & B, analgesics/sedatives   Pain: managed    SAT: Not intubated  D: Delirium   CAM-ICU: Overall CAM-ICU: Negative  E: Early(intubated/ Progressive (non-intubated) Mobility   MOVE Screen: patient no intubated   Activity: Activity Management: Rolling - L1  FAS: Feeding/Nutrition   Diet order: Diet/Nutrition Received: consistent carb/diabetic diet, low saturated fat/low cholesterol, no added salt, restrict fluids,   Fluid restriction: Fluid Requirement: 1500   Nutritional Supplement Intake: Quantity 0, Type: Boost  T: Thrombus   DVT prophylaxis: VTE Required Core Measure: Pharmacological prophylaxis initiated/maintained  H: HOB Elevation   Head of Bed (HOB) Positioning: HOB elevated  U: Ulcer Prophylaxis   GI: yes  G: Glucose control   managed      S: Skin   Nurses Note -- 4 Eyes  Skin assessed during: Q Shift Change   CHOOSE ONE:  [] No Altered Skin Integrity Present      []Prevention Measures Documented    [x] Yes- Altered Skin Integrity Present or Discovered     [] LDA Added if Not in Epic (Describe Wound)     [] New Altered Skin Integrity was Present on Admit and Documented in LDA     [x] Wound Image Taken  Wound Care Consulted? No  Attending Nurse:  Husam Natarajan RN/Staff Member:  ADRIEL Jones    B: Bowel Function   no issues   I: Indwelling Catheters   [place] necessity:  no   CVC necessity: Yes   IPAD offered: Yes  D: De-escalation Antibx   No  Plan for the day   Dobutamine off, control intravascular volume, monitoring.   Family/Goals of care/Code Status   Code Status: Full Code     No acute events throughout day, VS and assessment per flow sheet, patient progressing towards goals as tolerated, plan of care reviewed with Yong Mcintyre and family, all concerns addressed, will continue to  monitor.

## 2024-07-22 NOTE — PROGRESS NOTES
[facility] - Cardiac Intensive Care  Cardiology  Progress Note    Patient Name: Yong Mcintyre  MRN: [id number]  Admission Date: 7/4/2024  Hospital Length of Stay: 18 days  Code Status: Full Code   Attending Physician: Rossy Leary MD   Primary Care Physician: Gianni Escalona MD  Expected Discharge Date: 7/25/2024  Principal Problem:Right ventricular dysfunction    Subjective:     Hospital Course:   The patient was admitted to the CCU for further management of right-sided heart failure. He was diuresed with a lasix gtt. Electrolytes were monitored and repleted as indicated. Palliative care was consulted given his poor prognosis. The patient had significant O2 requirements on admission which were slow to wean. A central line was placed for close CVP monitoring in the setting of aggressive diuresis. Nutrition consult placed for low salt diet education. Acetazolamide was added to his diuretic regimen due to persistent metabolic alkalosis. The patient required intermittent Bipap due to hypercapnic respiratory failure. Dobutamine was added to augment cardiac output to further diurese patient. The patient was in aflutter, EP was consulted. S/p cardioversion 7/16/24 with successful conversion to sinus rhythm. Pulm consulted for hypercapnia. Per pulm ok to remain on HFNC. Presentation concerning for end stage RA causing pum HTN, rheum consulted and recommendations appreciated. No therapies available, but rheum has ordered work up. Switched from IV to PO amio. Continued diamox. Patient to consider Palliative care. Palliative medicine consulted and patient expresses wishes for sister to be a part of discussions. The patient was trialed off  on 7/22.      Sister was reached and she stated that she would like her brother to come to [place]. The plan discussed was either we lower this patient's lasix and HFNC to an acceptable amount so he can be transported to [place] via helicopter or ambulance. If this plan is  unsuccessful then the plan would be to discharge this patient to hospice to [place].     Interval History:  weaned.     ROS  Objective:     Vital Signs (Most Recent):  Temp: 97.9 °F (36.6 °C) (07/22/24 1305)  Pulse: 81 (07/22/24 1505)  Resp: 15 (07/22/24 1505)  BP: 105/61 (07/22/24 1505)  SpO2: (!) 86 % (07/22/24 1505) Vital Signs (24h Range):  Temp:  [97.2 °F (36.2 °C)-98.1 °F (36.7 °C)] 97.9 °F (36.6 °C)  Pulse:  [80-96] 81  Resp:  [11-40] 15  SpO2:  [77 %-97 %] 86 %  BP: ()/(50-65) 105/61     Weight: 97 kg (213 lb 13.5 oz)  Body mass index is 37.42 kg/m².     SpO2: (!) 86 %         Intake/Output Summary (Last 24 hours) at 7/22/2024 1556  Last data filed at 7/22/2024 1205  Gross per 24 hour   Intake 852.75 ml   Output 2075 ml   Net -1222.25 ml       Lines/Drains/Airways       Peripherally Inserted Central Catheter Line  Duration             PICC Double Lumen 07/22/24 1120 right basilic <1 day              Central Venous Catheter Line  Duration             Percutaneous Central Line - Triple Lumen  07/08/24 1145 Internal Jugular Right 14 days              Peripheral Intravenous Line  Duration                  Peripheral IV - Single Lumen 07/20/24 2000 18 G Anterior;Proximal;Right Forearm 1 day                       Physical Exam  Vitals reviewed.   Constitutional:       General: He is not in acute distress.     Appearance: He is obese. He is ill-appearing.   Cardiovascular:      Rate and Rhythm: Normal rate and regular rhythm.   Pulmonary:      Effort: No respiratory distress.      Breath sounds: No rhonchi.   Abdominal:      General: Abdomen is flat.   Musculoskeletal:      Right lower leg: No edema.      Left lower leg: No edema.   Skin:     General: Skin is warm.   Neurological:      Mental Status: He is alert.            Significant Labs: All pertinent lab results from the last 24 hours have been reviewed.    Significant Imaging: Echocardiogram: 2D echo with color flow doppler: No results found. However,  "due to the size of the patient record, not all encounters were searched. Please check Results Review for a complete set of results.  Assessment and Plan:       * Right ventricular dysfunction  Acute on chronic. - NYHA class III symptoms with recurrent admissions.  - He was seen by [facility] outpatient 6/2024 who state patient was feeling better on new diuretic regimen however worried about the frequency of use of metolazone and his worsening kidney function.    - Admitted with CXR with cardiomegaly and pulmonary vascular congestion, suggestive of pulmonary edema secondary to CHF. BNP elevated at 800. Creatinine worse at 3.1 on admission.  - Given his recurrent HF admissions and most recent hemodynamics, [facility] believes his overall prognosis is poor and recommended palliative care consult. Palliative care evaluated patient. Patient wishes to continue full measures at this time.     - 2 gram sodium restriction and 1500cc fluid restriction.  - Encourage physical activity with graded exercise program.  - Acetazolamide PO for signs of contraction alkalosis   - lasix gtt   - Continue dobutamine gtt; weaned off 7/22  - amio PO  - Diuril as needed  - Monitor lytes BID; replete as indicated    Acute decompensated heart failure  See "RV dysfunction" for plan    Atypical atrial flutter  - New onset  - On warfarin for Afib  - Patient on warfarin but has been subtherapeutic, will need to assess risk of cardioversion vs benefit to avoid HF in a patient with HF and tachycardia    - Continue AC as per Afib  - Started Amiodarone for rhythm control  - Monitor electrolytes  -continued in Aflutter consulted EP, s/p DCCV/DAWIT on 7/16      Metabolic alkalosis  Bicarb 40's in setting of aggressive diuresis. Suspect contraction alkalosis.    - Continue diamox  - Trend Bicarb    Acute on chronic respiratory failure with hypoxia  Patient with Hypercapnic and Hypoxic Respiratory failure which is Acute on chronic.  he is on home oxygen at 3 LPM. " Supplemental oxygen was provided and noted- Oxygen Concentration (%):  [50-70] 50    Signs/symptoms of respiratory failure include- tachypnea and increased work of breathing. Contributing diagnoses includes - CHF Labs and images were reviewed. Patient Has recent ABG, which has been reviewed.    -- Trend VBG BID  -- BiPap during night and naps; okay for HFNC throughout the day per pulm  -- Goal SpO2 > 88%    Acute on chronic right-sided congestive heart failure  - See 'RV dysfunction'    Paroxysmal A-fib  xOn coumadin.    Lab Results   Component Value Date    INR 2.0 (H) 07/22/2024    INR 2.1 (H) 07/21/2024    INR 2.5 (H) 07/20/2024     -- Goal INR 2-3  -- Titrate warfarin daily    -- EP consulted for Aflutter, cardioverted 7/16    Acute on chronic diastolic CHF (congestive heart failure), NYHA class 4  Patient is identified as having Systolic (HFrEF) heart failure that is Acute on chronic. CHF is currently uncontrolled due to Continued edema of extremities. Latest ECHO performed and demonstrates- Results for orders placed during the hospital encounter of 07/04/24    Echo    Interpretation Summary    Left Ventricle: Mild global hypokinesis present. Septal flattening in diastole and systole consistent with right ventricular volume and pressure overload. There is moderately reduced systolic function with a visually estimated ejection fraction of 35 - 40%. Grade I diastolic dysfunction. Eatimated CO is 3.4 l/min    Right Ventricle: Severe right ventricular enlargement. Wall thickness is normal. Right ventricle wall motion has global hypokinesis. Systolic function is severely reduced.    Right Atrium: Right atrium is severely dilated.    Aortic Valve: The aortic valve is a trileaflet valve. There is mild aortic valve sclerosis. There is moderate annular calcification present.    Tricuspid Valve: There is moderate regurgitation with an anteromedial eccentrically directed jet.    Aorta: Aortic root is normal in size  "measuring 2.77 cm. Ascending aorta is normal measuring 2.51 cm.    Pulmonary Artery: There is severe pulmonary hypertension. The estimated pulmonary artery systolic pressure is 81 mmHg.    IVC/SVC: Elevated venous pressure at 15 mmHg.  . Continue Furosemide and monitor clinical status closely. Monitor on telemetry. Patient is off CHF pathway.  Monitor strict Is&Os and daily weights.  Place on fluid restriction of 1.5 L. Cardiology has been consulted. Continue to stress to patient importance of self efficacy and  on diet for CHF. Last BNP reviewed- and noted below No results for input(s): "BNP", "BNPTRIAGEBLO" in the last 168 hours.    Palliative care encounter  - patient not amenable to palliative options   - spoke to sister 7/22: stated that we will continue to try to wean HFNC and lasix to an acceptable amount to discharge on to [place], and if time limited trial is unsuccessful then will need to discharge the patient on hospice to [place]. The patient's sister states she will talk to the patient about considering hospice as an option. Planning for potential family meeting with sister, patient, and pall care on 7/23 at 2 PM     MALLIKA (obstructive sleep apnea)  See 'hypoventilation syndrome'    Pulmonary hypertension  WHO group 2-3 per his managing pulmonologist (Danyell at [facility])  Adherent with diet, CPAP, and on continuous O2 3L at home.     -- RV dysfunction treatment as above  -- Pulm consulted, recs appreciated   -- Concern for RA per pulm, rheum consulted    Hypoventilation syndrome  BIPAP QHS; NC during meals. Avoid HFNC.  Significant hypercapnia w/ pCO2 80-90; will start daytime BiPap    - BIPAP for ventilation preferred over high O2          VTE Risk Mitigation (From admission, onward)           Ordered     warfarin (COUMADIN) tablet 5 mg  Once per day on Sunday Monday Wednesday Friday Saturday 07/19/24 0830     IP VTE HIGH RISK PATIENT  Once         07/04/24 2302     Place sequential " compression device  Until discontinued         07/04/24 2207                    Sheeba Baum DO  Cardiology  [facility] - Cardiac Intensive Care

## 2024-07-22 NOTE — PLAN OF CARE
OT eval complete. OT POC and goals established.   Problem: Occupational Therapy  Goal: Occupational Therapy Goal  Description: Goals to be met by: 8/21/24     Patient will increase functional independence with ADLs by performing:    LE Dressing with Modified [place].  Grooming while standing at sink with Modified [place].  Toileting from toilet/bedside commode with Modified [place] for hygiene and clothing management.   Step transfer with Modified [place]  Toilet transfer to toilet/bedside commode with Modified [place].    Outcome: Progressing

## 2024-07-22 NOTE — ASSESSMENT & PLAN NOTE
Patient with a history of chronic CO2 retention, has labs with elevated bicarb for years (baseline appears upper 30's) on home NIV. Patient is adherent with home O2 and BiPAP. Per notes from [facility], there was some concern for inadequate ventilatory support at home due to worsening bicarb and Hgb, however patient has had multiple hospitalizations in the past calendar year alone for right heart failure. Most recent Echo confirming very high pulmonary artery systolic pressure, and some RV dysfunction but not severely dilating to the point of impeding left heart function. Patient's hypercapnia is multifactorial. Baseline ventilatory defect is present (severe obstruction on prior PFTs in the [facility] system) as well as sheer ventilatory restriction from significantly enlarged heart. Patient's baseline CO2 is hard to determine, but has been in the 70's with normal PH's on arterial blood gases. Patient has been appropriately aggressively diuresed during this hospitalization and may have developed a contraction alkalosis. It's likely some aspect of this increase in CO2 over the past few days is a compensatory response to his alkalosis. Has started diamox for assisted diuresis. Overall, patient has multiple severely morbid and high mortality conditions.     - ABG on 7/17 with normal pH of 7.37 and pCO2 of 70. Patient's pCO2 is at its baseline, patient with normal pH and pCO2 in the upper 70's and lower 80's on venous blood gas. This is a sign of well compensated chronic hypercapnic respiratory failure. Patient does not need continuous BiPAP, will only need it while he is sleeping. My bigger concern is patient's oxygenation, rather than his ventilation. He is requiring a high amount of support to maintain appropriate saturations. The patient's goal O2 saturation should be 88-92% and we should utilize whatever oxygen delivery system is needed to achieve those goals, including Comfort flow / high flow nasal cannula. Hypoxemia will  worsen patient's pulmonary hypertension.    - CTD workup ordered. RF has returned very high. He does appear to have parenchymal abnormalities on CT persistently with what appear to be cysts. 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 There is significant mosaicism on his CT previously which could be consistent with air trapping (prior PFTs with significantly elevated RV-TLC ratio consistent with air trapping), however PVOD / PH can have similar changes.    Recommendations  - Would continue diuresis   - BiPAP at night and with naps, otherwise OK for high-flow nasal cannula throughout the day. Would continue to wean HFNC  - Goal sat is 88-92%  - Continue bronchodilators   - Will follow up rheumatologic workup

## 2024-07-22 NOTE — ASSESSMENT & PLAN NOTE
Impression: Pt is a 49 y/o male with severe RV failure 2/2 Pulmonary HTN.  Patient admitted almost 3 weeks ago for diuresis but has had worsening respiratory failure requiring escalating oxygen requirements during his hospital stay    Pulm HTN/ILD/paroxysmal a-fib/atrial flutter/acute on chronic right sides heart failure/acute hypoxic resp failure  -management per primary team and other consultants     Code status: Full     Surrogate decision maker: Has HCPOA document naming father. In meeting today he said he would want his sister Paula to be his surrogate decision maker.  Patient confirmed again today that he would like to go live with his sister in [place] and that she is planning on taking care of him.    GOC/ACP  7/22/24  Patient feeling very hopeful about the future as he has noted improvements in his strength and function over the past couple of days, stating he was able to walk around for the 1st time.  Also he has had a decrease in his oxygen requirement from 60 to 40 L. he is insistent that he will leave the hospital, make it to [place] where he plans to live with his family.  He reports feeling like he has in a alf here and states that he is eager to be discharged but does not want to be discharged until he is medically ready.  He does not feel like he is at the end of his life.  He is still hopeful to returned to working out at the gym, driving his new car.  When asked where he would like to be at the very end of his life, patient states that he would prefer to be in a hospital setting as he feels like he had get the best care there.  In terms of what kind of treatment he would want to the end of his life, he does not want to make any of those decisions right now and would want to defer to his family and their best judgment when that time comes.  7/19/24  -Met with patient at bedside. Introduced palliative medicine. He did not remember prior conversations with [facility] [employer]. Patient was very surprised  to hear he was on 60L. After much explaining and reinforcing he was able to understand this is much more than his home 3-4L . He said he wished he would have known how bad things were. Said he was expecting to be discharged next week to drive his new car. His plan was to move to [place] to be closer to family. Explained that unfortunately the amount of O2 he is on can only be done in the hospital. Began discussion of options moving forward - continuing what we are doing here in the hospital until he no longer finds it acceptable or decompensates further. Other option would be weaning it off with comfort meds in which he is likely to die here (did not go into detail about this). Pt says he feels like his parents would want to keep him alive forever even if on machines. When asked how he feels about this he said he is just hoping this doesn't actually happen.    Prognosis:  While I do believe that patient is on a dying trajectory, as supported by his frequent hospitalizations and decline in his overall function, it does seem possible that he will recover from this acute episode.      Summary/recommendations:  -Goals:  Life prolongation with no clear limits on invasive therapies at this point in time; patient's ultimate goal is to get to [place] to live with his family.  -code status:  Full  -palliative care  is engaged in this case.  We need to determine what kind of oxygen requirement would be allowed either on a flight or with vehicle travel to [place] so that we can present the patient with a concrete goal and will help determine whether travel to [place] is a realistic option for him in the near future  -would consider LTAC placement for continue weaning of high-flow and rehab/PT/OT in the meantime  -Pt has asked that primary team reach out to his sister to explain dx and prognosis.

## 2024-07-22 NOTE — SUBJECTIVE & OBJECTIVE
"Interval History:   -pt weaned down from 60L to 40L over past few days  -reports that he was able to walk around the unit for the first time this weekend since hospitalization    Past Medical History:   Diagnosis Date    Arthritis     CHF (congestive heart failure)     Diastolic dysfunction    Cor pulmonale 11/05/2012    Gallstones     GERD (gastroesophageal reflux disease)     Hypertension     Morbid obesity 11/05/2012    Obesity hypoventilation syndrome     On home oxygen therapy 03/07/2014    MALLIKA (obstructive sleep apnea)     BPAP 16/11 with 3 L at night (continuous O2).    Paroxysmal atrial fibrillation     PFO (patent foramen ovale) 11/05/2012    status post closure    Pickwickian syndrome 11/05/2012    Pulmonary hypertension        Past Surgical History:   Procedure Laterality Date    CHOLECYSTECTOMY      RIGHT HEART CATHETERIZATION Right 03/22/2022    Procedure: INSERTION, CATHETER, RIGHT HEART;  Surgeon: Tony Martínez MD;  Location: [facility] CATH LAB;  Service: Cardiology;  Laterality: Right;    RIGHT HEART CATHETERIZATION Right 01/31/2024    Procedure: INSERTION, CATHETER, RIGHT HEART;  Surgeon: Sheri Ritter MD;  Location: [facility] CATH LAB;  Service: Cardiology;  Laterality: Right;    UPPER GASTROINTESTINAL ENDOSCOPY      2-3 years ago       Review of patient's allergies indicates:   Allergen Reactions    Lipitor [atorvastatin] Other (See Comments)     "it makes my legs feel like jelly"  Other reaction(s): causes legs to hurt       Medications:  Continuous Infusions:   0.9% NaCl   Intravenous Continuous        furosemide (Lasix) 500 mg in 50 mL infusion (conc: 10 mg/mL)  40 mg/hr Intravenous Continuous 4 mL/hr at 07/22/24 1429 40 mg/hr at 07/22/24 1429     Scheduled Meds:   albuterol  2 puff Inhalation Q6H    allopurinoL  300 mg Oral Daily    amiodarone  400 mg Oral BID    Followed by    [START ON 7/27/2024] amiodarone  200 mg Oral Daily    chlorothiazide (DIURIL) 250 mg in D5W 50 mL IVPB  250 mg Intravenous " Once    fluticasone-salmeterol 250-50 mcg/dose  1 puff Inhalation BID    pantoprazole  40 mg Oral Daily    QUEtiapine  50 mg Oral QHS    senna-docusate 8.6-50 mg  1 tablet Oral Daily    sodium chloride 0.9%  10 mL Intravenous Q6H    warfarin  5 mg Oral Once per day on Sunday Monday Wednesday Friday Saturday     PRN Meds:  Current Facility-Administered Medications:     0.9% NaCl, , Intravenous, PRN    0.9% NaCl, , Intravenous, PRN    acetaminophen, 650 mg, Oral, Q6H PRN    albuterol, 2 puff, Inhalation, Q4H PRN    aluminum & magnesium hydroxide-simethicone, 30 mL, Oral, Q6H PRN    glycerin adult, 1 suppository, Rectal, Daily PRN    melatonin, 6 mg, Oral, Nightly PRN    sodium chloride 0.9%, 10 mL, Intravenous, PRN    Flushing PICC/Midline Protocol, , , Until Discontinued **AND** sodium chloride 0.9%, 10 mL, Intravenous, Q6H **AND** sodium chloride 0.9%, 10 mL, Intravenous, PRN    Family History       Problem Relation (Age of Onset)    Arthritis Mother, Maternal Grandmother, Maternal Grandfather    Asthma Mother, Sister, Maternal Grandmother    Breast cancer Paternal Grandmother    Diabetes Maternal Grandmother    Down syndrome Brother    Gout Brother    Hypertension Father, Maternal Grandmother, Maternal Grandfather, Paternal Grandfather          Tobacco Use    Smoking status: Never    Smokeless tobacco: Never   Substance and Sexual Activity    Alcohol use: Yes     Comment: holidays  rare    Drug use: No    Sexual activity: Not Currently     Partners: Female       Review of Systems  Objective:     Vital Signs (Most Recent):  Temp: 98.1 °F (36.7 °C) (07/22/24 0705)  Pulse: 80 (07/22/24 1350)  Resp: (!) 26 (07/22/24 1350)  BP: (!) 85/50 (07/22/24 1205)  SpO2: (!) 87 % (07/22/24 1350) Vital Signs (24h Range):  Temp:  [97.2 °F (36.2 °C)-98.5 °F (36.9 °C)] 98.1 °F (36.7 °C)  Pulse:  [80-96] 80  Resp:  [11-40] 26  SpO2:  [77 %-97 %] 87 %  BP: ()/(50-65) 85/50     Weight: 97 kg (213 lb 13.5 oz)  Body mass index is  37.42 kg/m².       Physical Exam  Vitals and nursing note reviewed.   Constitutional:       Interventions: Nasal cannula in place.   HENT:      Head: Normocephalic and atraumatic.   Cardiovascular:      Rate and Rhythm: Normal rate.   Pulmonary:      Effort: Pulmonary effort is normal. No respiratory distress.      Comments: Comfort flow on at 60 L 64 %  Abdominal:      General: There is distension.   Skin:     General: Skin is warm and dry.   Neurological:      General: No focal deficit present.      Mental Status: He is alert and oriented to person, place, and time.   Psychiatric:         Behavior: Behavior is cooperative.            Review of Symptoms      Symptom Assessment (ESAS 0-10 Scale)  Pain:  0  Dyspnea:  0  Anxiety:  0  Nausea:  0  Depression:  0  Anorexia:  0  Fatigue:  0  Insomnia:  0  Restlessness:  0  Agitation:  0         Performance Status:  70    Living Arrangements:  Lives alone    Psychosocial/Cultural:   See Palliative Psychosocial Note: No  Pt lives alone, no adult children. Pt's Dad lives in [facility] and pt's mother lives in [place]. Per pt, they are both aware of his medical issues they get along.   **Primary  to Follow**  Palliative Care  Consult: Yes        Advance Care Planning   Advance Directives:   Living Will: No    LaPOST: No    Do Not Resuscitate Status: No    Medical Power of : Yes    Goals of Care: What is most important right now is to focus on extending life as long as possible, even it it means sacrificing quality, curative/life-prolongation (regardless of treatment burdens). Accordingly, we have decided that the best plan to meet the patient's goals includes continuing with treatment.         Significant Labs: All pertinent labs within the past 24 hours have been reviewed.  CBC:   Recent Labs   Lab 07/22/24 0421   WBC 5.73   HGB 12.2*   HCT 43.2   MCV 87        BMP:  Recent Labs   Lab 07/22/24 0421   GLU 87      K 3.0*   CL 92*    CO2 35*   BUN 48*   CREATININE 1.9*   CALCIUM 8.9   MG 1.7     LFT:  Lab Results   Component Value Date    AST 16 07/22/2024    ALKPHOS 116 07/22/2024    BILITOT 0.9 07/22/2024     Albumin:   Albumin   Date Value Ref Range Status   07/22/2024 2.4 (L) 3.5 - 5.2 g/dL Final     Protein:   Total Protein   Date Value Ref Range Status   07/22/2024 7.3 6.0 - 8.4 g/dL Final     Lactic acid:   Lab Results   Component Value Date    LACTATE 1.1 07/05/2024    LACTATE 1.1 04/22/2024       Significant Imaging: I have reviewed all pertinent imaging results/findings within the past 24 hours.      CT chest 7/1Impression:     Similar appearance of diffuse ground-glass opacity, suggestive of pulmonary edema.     Dilatation of the main pulmonary artery compatible with pulmonary hypertension.     Cardiomegaly.   8/24

## 2024-07-23 LAB
ALBUMIN SERPL BCP-MCNC: 2.8 G/DL (ref 3.5–5.2)
ALLENS TEST: ABNORMAL
ALP SERPL-CCNC: 133 U/L (ref 55–135)
ALT SERPL W/O P-5'-P-CCNC: 10 U/L (ref 10–44)
ANION GAP SERPL CALC-SCNC: 10 MMOL/L (ref 8–16)
ANION GAP SERPL CALC-SCNC: 8 MMOL/L (ref 8–16)
ANION GAP SERPL CALC-SCNC: 9 MMOL/L (ref 8–16)
ANION GAP SERPL CALC-SCNC: 9 MMOL/L (ref 8–16)
AST SERPL-CCNC: 18 U/L (ref 10–40)
BASOPHILS # BLD AUTO: 0.04 K/UL (ref 0–0.2)
BASOPHILS NFR BLD: 0.7 % (ref 0–1.9)
BILIRUB SERPL-MCNC: 0.8 MG/DL (ref 0.1–1)
BUN SERPL-MCNC: 50 MG/DL (ref 6–20)
BUN SERPL-MCNC: 52 MG/DL (ref 6–20)
BUN SERPL-MCNC: 52 MG/DL (ref 6–20)
BUN SERPL-MCNC: 54 MG/DL (ref 6–20)
CALCIUM SERPL-MCNC: 8.9 MG/DL (ref 8.7–10.5)
CALCIUM SERPL-MCNC: 9.3 MG/DL (ref 8.7–10.5)
CALCIUM SERPL-MCNC: 9.5 MG/DL (ref 8.7–10.5)
CALCIUM SERPL-MCNC: 9.9 MG/DL (ref 8.7–10.5)
CHLORIDE SERPL-SCNC: 91 MMOL/L (ref 95–110)
CHLORIDE SERPL-SCNC: 92 MMOL/L (ref 95–110)
CHLORIDE SERPL-SCNC: 93 MMOL/L (ref 95–110)
CHLORIDE SERPL-SCNC: 93 MMOL/L (ref 95–110)
CO2 SERPL-SCNC: 32 MMOL/L (ref 23–29)
CO2 SERPL-SCNC: 34 MMOL/L (ref 23–29)
CO2 SERPL-SCNC: 34 MMOL/L (ref 23–29)
CO2 SERPL-SCNC: 36 MMOL/L (ref 23–29)
CREAT SERPL-MCNC: 2.1 MG/DL (ref 0.5–1.4)
CREAT SERPL-MCNC: 2.2 MG/DL (ref 0.5–1.4)
CREAT SERPL-MCNC: 2.2 MG/DL (ref 0.5–1.4)
CREAT SERPL-MCNC: 2.4 MG/DL (ref 0.5–1.4)
DELSYS: ABNORMAL
DELSYS: ABNORMAL
DIFFERENTIAL METHOD BLD: ABNORMAL
EOSINOPHIL # BLD AUTO: 0.1 K/UL (ref 0–0.5)
EOSINOPHIL NFR BLD: 2.2 % (ref 0–8)
ERYTHROCYTE [DISTWIDTH] IN BLOOD BY AUTOMATED COUNT: 22 % (ref 11.5–14.5)
EST. GFR  (NO RACE VARIABLE): 32.5 ML/MIN/1.73 M^2
EST. GFR  (NO RACE VARIABLE): 36 ML/MIN/1.73 M^2
EST. GFR  (NO RACE VARIABLE): 36 ML/MIN/1.73 M^2
EST. GFR  (NO RACE VARIABLE): 38.1 ML/MIN/1.73 M^2
FIO2: 40
FLOW: 40
FLOW: 45
GLUCOSE SERPL-MCNC: 139 MG/DL (ref 70–110)
GLUCOSE SERPL-MCNC: 184 MG/DL (ref 70–110)
GLUCOSE SERPL-MCNC: 89 MG/DL (ref 70–110)
GLUCOSE SERPL-MCNC: 92 MG/DL (ref 70–110)
HCO3 UR-SCNC: 38.1 MMOL/L (ref 24–28)
HCT VFR BLD AUTO: 44.8 % (ref 40–54)
HGB BLD-MCNC: 12.9 G/DL (ref 14–18)
IMM GRANULOCYTES # BLD AUTO: 0.01 K/UL (ref 0–0.04)
IMM GRANULOCYTES NFR BLD AUTO: 0.2 % (ref 0–0.5)
INR PPP: 2.2 (ref 0.8–1.2)
LYMPHOCYTES # BLD AUTO: 1.1 K/UL (ref 1–4.8)
LYMPHOCYTES NFR BLD: 18.5 % (ref 18–48)
MAGNESIUM SERPL-MCNC: 2.1 MG/DL (ref 1.6–2.6)
MCH RBC QN AUTO: 25.1 PG (ref 27–31)
MCHC RBC AUTO-ENTMCNC: 28.8 G/DL (ref 32–36)
MCV RBC AUTO: 87 FL (ref 82–98)
MODE: ABNORMAL
MODE: ABNORMAL
MONOCYTES # BLD AUTO: 0.4 K/UL (ref 0.3–1)
MONOCYTES NFR BLD: 6.9 % (ref 4–15)
NEUTROPHILS # BLD AUTO: 4.3 K/UL (ref 1.8–7.7)
NEUTROPHILS NFR BLD: 71.5 % (ref 38–73)
NRBC BLD-RTO: 0 /100 WBC
PCO2 BLDA: 68.2 MMHG (ref 35–45)
PH SMN: 7.36 [PH] (ref 7.35–7.45)
PHOSPHATE SERPL-MCNC: 4.1 MG/DL (ref 2.7–4.5)
PLATELET # BLD AUTO: 198 K/UL (ref 150–450)
PMV BLD AUTO: 11.6 FL (ref 9.2–12.9)
PO2 BLDA: 26 MMHG (ref 40–60)
PO2 BLDA: 26 MMHG (ref 40–60)
PO2 BLDA: 35 MMHG (ref 40–60)
POC BE: 13 MMOL/L
POC SATURATED O2: 39 % (ref 95–100)
POC SATURATED O2: 43 % (ref 95–100)
POC SATURATED O2: 57 % (ref 95–100)
POC TCO2: 40 MMOL/L (ref 24–29)
POTASSIUM SERPL-SCNC: 3.3 MMOL/L (ref 3.5–5.1)
POTASSIUM SERPL-SCNC: 3.6 MMOL/L (ref 3.5–5.1)
POTASSIUM SERPL-SCNC: 3.6 MMOL/L (ref 3.5–5.1)
POTASSIUM SERPL-SCNC: 3.8 MMOL/L (ref 3.5–5.1)
PROT SERPL-MCNC: 7.9 G/DL (ref 6–8.4)
PROTHROMBIN TIME: 23.1 SEC (ref 9–12.5)
RBC # BLD AUTO: 5.13 M/UL (ref 4.6–6.2)
SAMPLE: ABNORMAL
SITE: ABNORMAL
SODIUM SERPL-SCNC: 132 MMOL/L (ref 136–145)
SODIUM SERPL-SCNC: 135 MMOL/L (ref 136–145)
SODIUM SERPL-SCNC: 137 MMOL/L (ref 136–145)
SODIUM SERPL-SCNC: 137 MMOL/L (ref 136–145)
SP02: 92
WBC # BLD AUTO: 5.94 K/UL (ref 3.9–12.7)

## 2024-07-23 PROCEDURE — 63600175 PHARM REV CODE 636 W HCPCS: Performed by: STUDENT IN AN ORGANIZED HEALTH CARE EDUCATION/TRAINING PROGRAM

## 2024-07-23 PROCEDURE — 80053 COMPREHEN METABOLIC PANEL: CPT

## 2024-07-23 PROCEDURE — 85025 COMPLETE CBC W/AUTO DIFF WBC: CPT

## 2024-07-23 PROCEDURE — 25000003 PHARM REV CODE 250: Performed by: STUDENT IN AN ORGANIZED HEALTH CARE EDUCATION/TRAINING PROGRAM

## 2024-07-23 PROCEDURE — 63600175 PHARM REV CODE 636 W HCPCS

## 2024-07-23 PROCEDURE — 94660 CPAP INITIATION&MGMT: CPT

## 2024-07-23 PROCEDURE — 84100 ASSAY OF PHOSPHORUS: CPT

## 2024-07-23 PROCEDURE — 83516 IMMUNOASSAY NONANTIBODY: CPT | Mod: 59 | Performed by: STUDENT IN AN ORGANIZED HEALTH CARE EDUCATION/TRAINING PROGRAM

## 2024-07-23 PROCEDURE — 20000000 HC ICU ROOM

## 2024-07-23 PROCEDURE — 27100171 HC OXYGEN HIGH FLOW UP TO 24 HOURS

## 2024-07-23 PROCEDURE — 99233 SBSQ HOSP IP/OBS HIGH 50: CPT | Mod: ,,, | Performed by: EMERGENCY MEDICINE

## 2024-07-23 PROCEDURE — A4216 STERILE WATER/SALINE, 10 ML: HCPCS | Performed by: INTERNAL MEDICINE

## 2024-07-23 PROCEDURE — 99900035 HC TECH TIME PER 15 MIN (STAT)

## 2024-07-23 PROCEDURE — 99231 SBSQ HOSP IP/OBS SF/LOW 25: CPT | Mod: GC,,, | Performed by: INTERNAL MEDICINE

## 2024-07-23 PROCEDURE — 82164 ANGIOTENSIN I ENZYME TEST: CPT | Performed by: STUDENT IN AN ORGANIZED HEALTH CARE EDUCATION/TRAINING PROGRAM

## 2024-07-23 PROCEDURE — 25000003 PHARM REV CODE 250: Performed by: INTERNAL MEDICINE

## 2024-07-23 PROCEDURE — 85549 MURAMIDASE: CPT | Performed by: STUDENT IN AN ORGANIZED HEALTH CARE EDUCATION/TRAINING PROGRAM

## 2024-07-23 PROCEDURE — 83516 IMMUNOASSAY NONANTIBODY: CPT | Performed by: STUDENT IN AN ORGANIZED HEALTH CARE EDUCATION/TRAINING PROGRAM

## 2024-07-23 PROCEDURE — 80048 BASIC METABOLIC PNL TOTAL CA: CPT | Mod: 91,XB | Performed by: INTERNAL MEDICINE

## 2024-07-23 PROCEDURE — 97116 GAIT TRAINING THERAPY: CPT

## 2024-07-23 PROCEDURE — 94761 N-INVAS EAR/PLS OXIMETRY MLT: CPT | Mod: XB

## 2024-07-23 PROCEDURE — 82803 BLOOD GASES ANY COMBINATION: CPT

## 2024-07-23 PROCEDURE — 83735 ASSAY OF MAGNESIUM: CPT

## 2024-07-23 PROCEDURE — 94640 AIRWAY INHALATION TREATMENT: CPT

## 2024-07-23 PROCEDURE — 86036 ANCA SCREEN EACH ANTIBODY: CPT | Mod: 59 | Performed by: STUDENT IN AN ORGANIZED HEALTH CARE EDUCATION/TRAINING PROGRAM

## 2024-07-23 PROCEDURE — 94799 UNLISTED PULMONARY SVC/PX: CPT

## 2024-07-23 PROCEDURE — 25000003 PHARM REV CODE 250

## 2024-07-23 PROCEDURE — 85610 PROTHROMBIN TIME: CPT

## 2024-07-23 RX ORDER — POTASSIUM CHLORIDE 20 MEQ/1
40 TABLET, EXTENDED RELEASE ORAL ONCE
Status: COMPLETED | OUTPATIENT
Start: 2024-07-23 | End: 2024-07-23

## 2024-07-23 RX ORDER — DOBUTAMINE HYDROCHLORIDE 400 MG/100ML
5 INJECTION INTRAVENOUS CONTINUOUS
Status: DISCONTINUED | OUTPATIENT
Start: 2024-07-23 | End: 2024-07-29 | Stop reason: HOSPADM

## 2024-07-23 RX ORDER — POTASSIUM CHLORIDE 750 MG/1
50 CAPSULE, EXTENDED RELEASE ORAL ONCE
Status: COMPLETED | OUTPATIENT
Start: 2024-07-23 | End: 2024-07-23

## 2024-07-23 RX ORDER — POTASSIUM CHLORIDE 20 MEQ/1
20 TABLET, EXTENDED RELEASE ORAL ONCE
Status: COMPLETED | OUTPATIENT
Start: 2024-07-23 | End: 2024-07-23

## 2024-07-23 RX ADMIN — QUETIAPINE FUMARATE 50 MG: 25 TABLET ORAL at 09:07

## 2024-07-23 RX ADMIN — POTASSIUM CHLORIDE 40 MEQ: 1500 TABLET, EXTENDED RELEASE ORAL at 09:07

## 2024-07-23 RX ADMIN — Medication 10 ML: at 12:07

## 2024-07-23 RX ADMIN — AMIODARONE HYDROCHLORIDE 400 MG: 200 TABLET ORAL at 09:07

## 2024-07-23 RX ADMIN — ALLOPURINOL 300 MG: 100 TABLET ORAL at 10:07

## 2024-07-23 RX ADMIN — ALBUTEROL SULFATE 2 PUFF: 108 INHALANT RESPIRATORY (INHALATION) at 08:07

## 2024-07-23 RX ADMIN — ALBUTEROL SULFATE 2 PUFF: 108 INHALANT RESPIRATORY (INHALATION) at 12:07

## 2024-07-23 RX ADMIN — AMIODARONE HYDROCHLORIDE 400 MG: 200 TABLET ORAL at 10:07

## 2024-07-23 RX ADMIN — Medication 10 ML: at 06:07

## 2024-07-23 RX ADMIN — POTASSIUM CHLORIDE 40 MEQ: 1500 TABLET, EXTENDED RELEASE ORAL at 04:07

## 2024-07-23 RX ADMIN — ALBUTEROL SULFATE 2 PUFF: 108 INHALANT RESPIRATORY (INHALATION) at 09:07

## 2024-07-23 RX ADMIN — FLUTICASONE PROPIONATE AND SALMETEROL 1 PUFF: 50; 250 POWDER RESPIRATORY (INHALATION) at 08:07

## 2024-07-23 RX ADMIN — FUROSEMIDE 40 MG/HR: 10 INJECTION, SOLUTION INTRAMUSCULAR; INTRAVENOUS at 03:07

## 2024-07-23 RX ADMIN — ALBUTEROL SULFATE 2 PUFF: 108 INHALANT RESPIRATORY (INHALATION) at 02:07

## 2024-07-23 RX ADMIN — POTASSIUM CHLORIDE 20 MEQ: 1500 TABLET, EXTENDED RELEASE ORAL at 12:07

## 2024-07-23 RX ADMIN — FLUTICASONE PROPIONATE AND SALMETEROL 1 PUFF: 50; 250 POWDER RESPIRATORY (INHALATION) at 09:07

## 2024-07-23 RX ADMIN — FUROSEMIDE 40 MG/HR: 10 INJECTION, SOLUTION INTRAMUSCULAR; INTRAVENOUS at 01:07

## 2024-07-23 RX ADMIN — POTASSIUM CHLORIDE 50 MEQ: 750 CAPSULE, EXTENDED RELEASE ORAL at 06:07

## 2024-07-23 RX ADMIN — DOBUTAMINE HYDROCHLORIDE 5 MCG/KG/MIN: 400 INJECTION INTRAVENOUS at 12:07

## 2024-07-23 RX ADMIN — PANTOPRAZOLE SODIUM 40 MG: 40 TABLET, DELAYED RELEASE ORAL at 10:07

## 2024-07-23 RX ADMIN — CHLOROTHIAZIDE SODIUM 250 MG: 500 INJECTION, POWDER, LYOPHILIZED, FOR SOLUTION INTRAVENOUS at 01:07

## 2024-07-23 NOTE — PLAN OF CARE
Problem: Physical Therapy  Goal: Physical Therapy Goal  Description: Goals to be met by: 2024     Patient will increase functional independence with mobility by performin. Supine to sit with Emanuel  2. Sit to stand transfer with Emanuel  3. Gait  x 150 feet with Supervision using No Assistive Device.   4. Ascend/descend 10 stair with right Handrails Supervision using No Assistive Device.     Outcome: Progressing     Goals were reviewed and remain appropriate 2024

## 2024-07-23 NOTE — ASSESSMENT & PLAN NOTE
Pt is a 49 y/o male with severe RV failure 2/2 Pulmonary HTN.  Patient admitted almost 3 weeks ago for diuresis but has had worsening respiratory failure requiring escalating oxygen requirements during his hospital stay    Pulm HTN/ILD/paroxysmal a-fib/atrial flutter/acute on chronic right sides heart failure/acute hypoxic resp failure  -management per primary team and other consultants     Code status: Full     Surrogate decision maker: Has HCPOA document naming father. In meeting today he said he would want his sister Paula to be his surrogate decision maker.  Patient confirmed again today that he would like to go live with his sister in [place] and that she is planning on taking care of him.    GOC/ACP  7/23/24  We met with the patient, Dr. Patel and Dr. Leary from the CCU team and the patient's sister, Paula, via phone  We updated the patient and the sister on current challenges the patient is facing including most importantly his high oxygen requirement.  Sister emphasized repeatedly the importance of moving the patient to [place] as he would have considerably more support in that [facility] than he does in [place].  We shared are worried that if the patient's oxygen requirement does not decreased, there may not be a feasible way to transport the patient.  We discussed different oxygen delivery devices and explained the limitations of them.  Sister requesting patient be transferred to a hospital in the [place] area.  Dr. Leary shared her worries that the patient would not meet criteria for transfer to a new hospital system but assured the patient and his sister that she would make a referral to the transfer center.  We tried to discuss disposition options for the patient should the transfer request fail, sister and patient not really open to discussing alternative plans.  7/22/24  Patient feeling very hopeful about the future as he has noted improvements in his strength and function over the past couple  of days, stating he was able to walk around for the 1st time.  Also he has had a decrease in his oxygen requirement from 60 to 40 L. he is insistent that he will leave the hospital, make it to [place] where he plans to live with his family.  He reports feeling like he has in a FCI here and states that he is eager to be discharged but does not want to be discharged until he is medically ready.  He does not feel like he is at the end of his life.  He is still hopeful to returned to working out at the gym, driving his new car.  When asked where he would like to be at the very end of his life, patient states that he would prefer to be in a hospital setting as he feels like he had get the best care there.  In terms of what kind of treatment he would want to the end of his life, he does not want to make any of those decisions right now and would want to defer to his family and their best judgment when that time comes.  7/19/24  -Met with patient at bedside. Introduced palliative medicine. He did not remember prior conversations with [facility] [employer]. Patient was very surprised to hear he was on 60L. After much explaining and reinforcing he was able to understand this is much more than his home 3-4L . He said he wished he would have known how bad things were. Said he was expecting to be discharged next week to drive his new car. His plan was to move to [place] to be closer to family. Explained that unfortunately the amount of O2 he is on can only be done in the hospital. Began discussion of options moving forward - continuing what we are doing here in the hospital until he no longer finds it acceptable or decompensates further. Other option would be weaning it off with comfort meds in which he is likely to die here (did not go into detail about this). Pt says he feels like his parents would want to keep him alive forever even if on machines. When asked how he feels about this he said he is just hoping this doesn't actually  happen.    Prognosis:  While I do believe that patient is on a dying trajectory, as supported by his frequent hospitalizations and decline in his overall function, it does seem possible that he will recover from this acute episode.      Summary/recommendations:  -Goals: patient's ultimate goal is to improve to the point of being independent, mobile, driving his truck, going to the gym.  He also wants to get to [place] to live with his family.  At this point in time, he does not seem capable of imagining a future in which he does not recover.   -Code status:  Full  -palliative care  is engaged in this case.  Suspect that the maximum oxygen the patient could be on for private transport would be 4-5 L.    -would consider LTAC placement for continue weaning of high-flow and rehab/PT/OT in the meantime  -Dr. Leary to request transfer of pt to hospital system vs LTACH in [place]

## 2024-07-23 NOTE — SUBJECTIVE & OBJECTIVE
"Interval History:   -pt on about 45L, 50% FIO2 today  -still getting up and ambulating without much difficulty  -small increase in creatinine, restarted on dobutamine    Past Medical History:   Diagnosis Date    Arthritis     CHF (congestive heart failure)     Diastolic dysfunction    Cor pulmonale 11/05/2012    Gallstones     GERD (gastroesophageal reflux disease)     Hypertension     Morbid obesity 11/05/2012    Obesity hypoventilation syndrome     On home oxygen therapy 03/07/2014    MALLIKA (obstructive sleep apnea)     BPAP 16/11 with 3 L at night (continuous O2).    Paroxysmal atrial fibrillation     PFO (patent foramen ovale) 11/05/2012    status post closure    Pickwickian syndrome 11/05/2012    Pulmonary hypertension        Past Surgical History:   Procedure Laterality Date    CHOLECYSTECTOMY      RIGHT HEART CATHETERIZATION Right 03/22/2022    Procedure: INSERTION, CATHETER, RIGHT HEART;  Surgeon: Tony Martínez MD;  Location: [facility] CATH LAB;  Service: Cardiology;  Laterality: Right;    RIGHT HEART CATHETERIZATION Right 01/31/2024    Procedure: INSERTION, CATHETER, RIGHT HEART;  Surgeon: Sheri Ritter MD;  Location: [facility] CATH LAB;  Service: Cardiology;  Laterality: Right;    UPPER GASTROINTESTINAL ENDOSCOPY      2-3 years ago       Review of patient's allergies indicates:   Allergen Reactions    Lipitor [atorvastatin] Other (See Comments)     "it makes my legs feel like jelly"  Other reaction(s): causes legs to hurt       Medications:  Continuous Infusions:   DOBUTamine IV infusion (non-titrating)  5 mcg/kg/min Intravenous Continuous 7.8 mL/hr at 07/23/24 1500 5 mcg/kg/min at 07/23/24 1500    furosemide (Lasix) 500 mg in 50 mL infusion (conc: 10 mg/mL)  40 mg/hr Intravenous Continuous 4 mL/hr at 07/23/24 1500 40 mg/hr at 07/23/24 1500     Scheduled Meds:   albuterol  2 puff Inhalation Q6H    allopurinoL  300 mg Oral Daily    amiodarone  400 mg Oral BID    Followed by    [START ON 7/27/2024] amiodarone  200 " mg Oral Daily    fluticasone-salmeterol 250-50 mcg/dose  1 puff Inhalation BID    pantoprazole  40 mg Oral Daily    QUEtiapine  50 mg Oral QHS    senna-docusate 8.6-50 mg  1 tablet Oral Daily    sodium chloride 0.9%  10 mL Intravenous Q6H    warfarin  5 mg Oral Once per day on Sunday Monday Wednesday Friday Saturday     PRN Meds:  Current Facility-Administered Medications:     0.9% NaCl, , Intravenous, PRN    0.9% NaCl, , Intravenous, PRN    acetaminophen, 650 mg, Oral, Q6H PRN    albuterol, 2 puff, Inhalation, Q4H PRN    aluminum & magnesium hydroxide-simethicone, 30 mL, Oral, Q6H PRN    glycerin adult, 1 suppository, Rectal, Daily PRN    melatonin, 6 mg, Oral, Nightly PRN    sodium chloride 0.9%, 10 mL, Intravenous, PRN    Flushing PICC/Midline Protocol, , , Until Discontinued **AND** sodium chloride 0.9%, 10 mL, Intravenous, Q6H **AND** sodium chloride 0.9%, 10 mL, Intravenous, PRN    Family History       Problem Relation (Age of Onset)    Arthritis Mother, Maternal Grandmother, Maternal Grandfather    Asthma Mother, Sister, Maternal Grandmother    Breast cancer Paternal Grandmother    Diabetes Maternal Grandmother    Down syndrome Brother    Gout Brother    Hypertension Father, Maternal Grandmother, Maternal Grandfather, Paternal Grandfather          Tobacco Use    Smoking status: Never    Smokeless tobacco: Never   Substance and Sexual Activity    Alcohol use: Yes     Comment: holidays  rare    Drug use: No    Sexual activity: Not Currently     Partners: Female       Review of Systems  Objective:     Vital Signs (Most Recent):  Temp: 98.4 °F (36.9 °C) (07/23/24 1101)  Pulse: 90 (07/23/24 1500)  Resp: (!) 29 (07/23/24 1500)  BP: 112/61 (07/23/24 1500)  SpO2: (!) 92 % (07/23/24 1500) Vital Signs (24h Range):  Temp:  [97.5 °F (36.4 °C)-98.8 °F (37.1 °C)] 98.4 °F (36.9 °C)  Pulse:  [] 90  Resp:  [13-42] 29  SpO2:  [82 %-97 %] 92 %  BP: ()/(50-63) 112/61     Weight: 103.6 kg (228 lb 6.3 oz)  Body mass  index is 39.2 kg/m².       Physical Exam  Vitals and nursing note reviewed.   Constitutional:       Interventions: Nasal cannula in place.   HENT:      Head: Normocephalic and atraumatic.   Cardiovascular:      Rate and Rhythm: Normal rate.   Pulmonary:      Effort: Pulmonary effort is normal. No respiratory distress.      Comments: Comfort flow on at 60 L 64 %  Abdominal:      General: There is distension.   Skin:     General: Skin is warm and dry.   Neurological:      General: No focal deficit present.      Mental Status: He is alert and oriented to person, place, and time.   Psychiatric:         Behavior: Behavior is cooperative.            Review of Symptoms      Symptom Assessment (ESAS 0-10 Scale)  Pain:  0  Dyspnea:  0  Anxiety:  0  Nausea:  0  Depression:  0  Anorexia:  0  Fatigue:  0  Insomnia:  0  Restlessness:  0  Agitation:  0         Performance Status:  70    Living Arrangements:  Lives alone    Psychosocial/Cultural:   See Palliative Psychosocial Note: No  Pt lives alone, no adult children. Pt's Dad lives in [facility] and pt's mother lives in [place]. Per pt, they are both aware of his medical issues they get along.   **Primary  to Follow**  Palliative Care  Consult: Yes        Advance Care Planning   Advance Directives:   Living Will: No    LaPOST: No    Do Not Resuscitate Status: No    Medical Power of : Yes    Goals of Care: What is most important right now is to focus on extending life as long as possible, even it it means sacrificing quality, curative/life-prolongation (regardless of treatment burdens). Accordingly, we have decided that the best plan to meet the patient's goals includes continuing with treatment.         Significant Labs: All pertinent labs within the past 24 hours have been reviewed.  CBC:   Recent Labs   Lab 07/23/24 0319   WBC 5.94   HGB 12.9*   HCT 44.8   MCV 87        BMP:  Recent Labs   Lab 07/23/24 0319 07/23/24  0955   GLU 92 89     137   K 3.3* 3.8   CL 92* 93*   CO2 36* 34*   BUN 50* 52*   CREATININE 2.1* 2.4*   CALCIUM 9.5 9.9   MG 2.1  --      LFT:  Lab Results   Component Value Date    AST 18 07/23/2024    ALKPHOS 133 07/23/2024    BILITOT 0.8 07/23/2024     Albumin:   Albumin   Date Value Ref Range Status   07/23/2024 2.8 (L) 3.5 - 5.2 g/dL Final     Protein:   Total Protein   Date Value Ref Range Status   07/23/2024 7.9 6.0 - 8.4 g/dL Final     Lactic acid:   Lab Results   Component Value Date    LACTATE 1.1 07/05/2024    LACTATE 1.1 04/22/2024       Significant Imaging: I have reviewed all pertinent imaging results/findings within the past 24 hours.      CT chest 7/1Impression:     Similar appearance of diffuse ground-glass opacity, suggestive of pulmonary edema.     Dilatation of the main pulmonary artery compatible with pulmonary hypertension.     Cardiomegaly.   8/24

## 2024-07-23 NOTE — ASSESSMENT & PLAN NOTE
"Patient is identified as having Systolic (HFrEF) heart failure that is Acute on chronic. CHF is currently uncontrolled due to Continued edema of extremities. Latest ECHO performed and demonstrates- Results for orders placed during the hospital encounter of 07/04/24    Echo    Interpretation Summary    Left Ventricle: Mild global hypokinesis present. Septal flattening in diastole and systole consistent with right ventricular volume and pressure overload. There is moderately reduced systolic function with a visually estimated ejection fraction of 35 - 40%. Grade I diastolic dysfunction. Eatimated CO is 3.4 l/min    Right Ventricle: Severe right ventricular enlargement. Wall thickness is normal. Right ventricle wall motion has global hypokinesis. Systolic function is severely reduced.    Right Atrium: Right atrium is severely dilated.    Aortic Valve: The aortic valve is a trileaflet valve. There is mild aortic valve sclerosis. There is moderate annular calcification present.    Tricuspid Valve: There is moderate regurgitation with an anteromedial eccentrically directed jet.    Aorta: Aortic root is normal in size measuring 2.77 cm. Ascending aorta is normal measuring 2.51 cm.    Pulmonary Artery: There is severe pulmonary hypertension. The estimated pulmonary artery systolic pressure is 81 mmHg.    IVC/SVC: Elevated venous pressure at 15 mmHg.  . Continue Furosemide and monitor clinical status closely. Monitor on telemetry. Patient is off CHF pathway.  Monitor strict Is&Os and daily weights.  Place on fluid restriction of 1.5 L. Cardiology has been consulted. Continue to stress to patient importance of self efficacy and  on diet for CHF. Last BNP reviewed- and noted below No results for input(s): "BNP", "BNPTRIAGEBLO" in the last 168 hours.  "

## 2024-07-23 NOTE — ASSESSMENT & PLAN NOTE
On coumadin.    Lab Results   Component Value Date    INR 2.2 (H) 07/23/2024    INR 2.0 (H) 07/22/2024    INR 2.1 (H) 07/21/2024     -- Goal INR 2-3  -- Titrate warfarin daily    -- EP consulted for Aflutter, cardioverted 7/16

## 2024-07-23 NOTE — PLAN OF CARE
CICU DAILY GOALS       A: Awake    RASS: Goal - RASS Goal: 0-->alert and calm  Actual - RASS (Carter Agitation-Sedation Scale): alert and calm   Restraint necessity:    B: Breath   SBT: Not intubated   C: Coordinate A & B, analgesics/sedatives   Pain: managed    SAT: Not intubated  D: Delirium   CAM-ICU: Overall CAM-ICU: Negative  E: Early(intubated/ Progressive (non-intubated) Mobility   MOVE Screen: Pass   Activity: Activity Management: Sitting at edge of bed - L2  FAS: Feeding/Nutrition   Diet order: Diet/Nutrition Received: consistent carb/diabetic diet, no added salt, restrict fluids,   Fluid restriction: Fluid Requirement: 1500  T: Thrombus   DVT prophylaxis: VTE Required Core Measure: Pharmacological prophylaxis initiated/maintained  H: HOB Elevation   Head of Bed (HOB) Positioning: other (see comments) 30-45  U: Ulcer Prophylaxis   GI: no  G: Glucose control   managed      S: Skin   Nurses Note -- 4 Eyes  Skin assessed during: Daily Assessment   CHOOSE ONE:  [] No Altered Skin Integrity Present      []Prevention Measures Documented    [x] Yes- Altered Skin Integrity Present or Discovered     [x] LDA Added if Not in Epic (Describe Wound)     [] New Altered Skin Integrity was Present on Admit and Documented in LDA     [] Wound Image Taken  Wound Care Consulted? Yes  Attending Nurse:  Britney MOREL  Second RN/Staff Member:  Noah MOREL    B: Bowel Function   no issues   I: Indwelling Catheters   Dunne necessity:     CVC necessity: Yes (PICC)   IPAD offered: Not appropriate  D: De-escalation Antibx   N/a  Plan for the day   Continue current plan of care. POC discussed with patient and patient verbalized understanding.   Family/Goals of care/Code Status   Code Status: Full Code     No acute events throughout day, VS and assessment per flow sheet, patient progressing towards goals as tolerated, plan of care reviewed with Yong Mcintyre and family, all concerns addressed, will continue with current plan of care.      Problem: Adult Inpatient Plan of Care  Goal: Plan of Care Review  Outcome: Progressing  Goal: Patient-Specific Goal (Individualized)  Outcome: Progressing  Goal: Absence of Hospital-Acquired Illness or Injury  Outcome: Progressing  Goal: Optimal Comfort and Wellbeing  Outcome: Progressing  Goal: Readiness for Transition of Care  Outcome: Progressing     Problem: Bariatric Environmental Safety  Goal: Safety Maintained with Care  Outcome: Progressing     Problem: Coping Ineffective  Goal: Effective Coping  Outcome: Progressing     Problem: Wound  Goal: Optimal Coping  Outcome: Progressing  Goal: Optimal Functional Ability  Outcome: Progressing  Goal: Absence of Infection Signs and Symptoms  Outcome: Progressing  Goal: Improved Oral Intake  Outcome: Progressing  Goal: Optimal Pain Control and Function  Outcome: Progressing  Goal: Skin Health and Integrity  Outcome: Progressing  Goal: Optimal Wound Healing  Outcome: Progressing     Problem: Fall Injury Risk  Goal: Absence of Fall and Fall-Related Injury  Outcome: Progressing     Problem: Skin Injury Risk Increased  Goal: Skin Health and Integrity  Outcome: Progressing     Problem: Gas Exchange Impaired  Goal: Optimal Gas Exchange  Outcome: Progressing     Problem: Infection  Goal: Absence of Infection Signs and Symptoms  Outcome: Progressing     Problem: Heart Failure  Goal: Optimal Coping  Outcome: Progressing  Goal: Optimal Cardiac Output  Outcome: Progressing  Goal: Stable Heart Rate and Rhythm  Outcome: Progressing  Goal: Optimal Functional Ability  Outcome: Progressing  Goal: Fluid and Electrolyte Balance  Outcome: Progressing  Goal: Improved Oral Intake  Outcome: Progressing  Goal: Effective Oxygenation and Ventilation  Outcome: Progressing  Goal: Effective Breathing Pattern During Sleep  Outcome: Progressing

## 2024-07-23 NOTE — SUBJECTIVE & OBJECTIVE
Interval History: failed trial off  given worsening renal function. Restarted      ROS  Objective:     Vital Signs (Most Recent):  Temp: 98.4 °F (36.9 °C) (07/23/24 1101)  Pulse: 94 (07/23/24 1700)  Resp: (!) 26 (07/23/24 1700)  BP: 104/67 (07/23/24 1700)  SpO2: (!) 90 % (07/23/24 1700) Vital Signs (24h Range):  Temp:  [97.5 °F (36.4 °C)-98.8 °F (37.1 °C)] 98.4 °F (36.9 °C)  Pulse:  [82-94] 94  Resp:  [13-42] 26  SpO2:  [82 %-97 %] 90 %  BP: ()/(50-67) 104/67     Weight: 103.6 kg (228 lb 6.3 oz)  Body mass index is 39.2 kg/m².     SpO2: (!) 90 %         Intake/Output Summary (Last 24 hours) at 7/23/2024 1752  Last data filed at 7/23/2024 1700  Gross per 24 hour   Intake 1255.5 ml   Output 1985 ml   Net -729.5 ml       Lines/Drains/Airways       Peripherally Inserted Central Catheter Line  Duration             PICC Double Lumen 07/22/24 1120 right basilic 1 day              Peripheral Intravenous Line  Duration                  Peripheral IV - Single Lumen 07/20/24 2000 18 G Anterior;Proximal;Right Forearm 2 days                       Physical Exam  Vitals reviewed.   Constitutional:       General: He is not in acute distress.     Appearance: He is obese. He is ill-appearing.   Cardiovascular:      Rate and Rhythm: Normal rate and regular rhythm.   Pulmonary:      Effort: No respiratory distress.      Breath sounds: No rhonchi.   Abdominal:      General: Abdomen is flat.   Musculoskeletal:      Right lower leg: No edema.      Left lower leg: No edema.   Skin:     General: Skin is warm.   Neurological:      Mental Status: He is alert.            Significant Labs: All pertinent lab results from the last 24 hours have been reviewed.    Significant Imaging: Echocardiogram: 2D echo with color flow doppler: No results found. However, due to the size of the patient record, not all encounters were searched. Please check Results Review for a complete set of results.

## 2024-07-23 NOTE — CHAPLAIN
"Palliative Care     Patient: Yong Mcintyre  MRN: [id number]  : 1975  Age: 48 y.o.  Hospital Length of Stay: 19  Code Status: Code Status Discussion Note  Attending Provider: Rossy Leary MD  Principal Problem: Right ventricular dysfunction    Non-clinical observations of patient/room: Visited pall med pt per request of pall med provider; pt awake, alert, sitting up in bedside chair; 40 min visit    Provided compassionate presence and listening ear for pt with LOS of 19 days; Pt shares that he feels a lot better than he did when he arrived-- "Do I look like a patient who is dying?"- I'm not!" Pt shares how he lost a lot of weight in 2016 in a weight loss contest, won 2nd place, he showed me pictures on his phone.    Pt is Sabianism, but doesn't attend [employer] but prays fairly regularly. This gives him strength. As well as his extended family in [place]-- upon d/c he will go to [place] to live with them-- sister, bro in law, mother and more. Pt owns a duplex home in [place], has been living alone, but can rent both out and go to [place]-- that's his plan.     Pt proudly said he walked with PT a lot the past couple days and PT came in during our visit and will come back; pt motivated to walk more and do all he can to extend his life. His biggest fear is his O2 needs, worried that it's high and he'll be "stranded at home on high flow". He also fears that his sister isn't getting the full picture of his health. Pt feels doctors are solely looking at his vitals/numbers, but he feels there's more to his health than that and he feels like he's getting better. Provided listening ear and did not confirm nor deny his claims.  He just bought a new car a month ago and got the windows tinted and he so wants to enjoy it. He showed me pictures on his phone.    Pt welcomed prayer so I prayed for/with/over him and he was appreciative. Gave him my card. Palliative  will contin to follow. , in your " mercy.    **Spiritual Care Dept. Chaplains are available evenings, overnight and weekends **    In Peace,    Rev. Jael Garcia MDiv, [facility]  Board Certified   Palliative Medicine Department  [phone]

## 2024-07-23 NOTE — ASSESSMENT & PLAN NOTE
Bicarb 40's in setting of aggressive diuresis. Suspect contraction alkalosis.    - Continue diamox prn  - Trend Bicarb   Onset: Chronic   Location / description: Had a hip injection last Wednesday. Felt really good after injection. Slept well that night. Thursday things started getting progressively worse and pain has been getting worse ever since. Pain is so bad I cannot sleep. States he has not slept in several nights because of the pain. Patient denies thoughts of harming himself.   Precipitating Factors: Has chronic left hip pain and needs to get it replaced.    Pain Scale (1-10), 10 highest: 10/10  Associated Symptoms: Denies any redness or swelling from the injection.   What improves / worsens symptoms: NA  Symptom specific medications: 4 Alleve every 2 hour-educated patient that this is more than recommended dose  Recent visits (last 3-4 weeks) for same reason or recent surgery: Had injection on Wednesday.     PLAN:   Directed to Emergency Department    Patient/Caller  Uncertain  to follow recommendations.  Reason for Disposition   [1] Caller requesting NON-URGENT health information AND [2] PCP's office is the best resource    Protocols used: Information Only Call - No Triage-A-    Patient called in s/p injection under fluoroscopy on 6/19. Initially hip pain was improved but shortly there after pain became progressively worse. So much so that the patient has not slept for several days. Patient was upset stating that no one understand his pain and that he only wants to speak with Dr. Silva. RN did advise that the patient should go to ED if his pain was severe and patient did seem agreeable. Lastly, patient did deny any SI thoughts.     This Epic encounter shoulder be under Dr. Lamonte Silva.

## 2024-07-23 NOTE — PROGRESS NOTES
[facility] - Cardiac Intensive Care  Cardiology  Progress Note    Patient Name: Yong Mcintyre  MRN: [id number]  Admission Date: 7/4/2024  Hospital Length of Stay: 19 days  Code Status: Full Code   Attending Physician: Rossy Leary MD   Primary Care Physician: Gianni Escalona MD  Expected Discharge Date: 7/25/2024  Principal Problem:Right ventricular dysfunction    Subjective:     Hospital Course:   The patient was admitted to the CCU for further management of right-sided heart failure. He was diuresed with a lasix gtt. Electrolytes were monitored and repleted as indicated. Palliative care was consulted given his poor prognosis. The patient had significant O2 requirements on admission which were slow to wean. A central line was placed for close CVP monitoring in the setting of aggressive diuresis. Nutrition consult placed for low salt diet education. Acetazolamide was added to his diuretic regimen due to persistent metabolic alkalosis. The patient required intermittent Bipap due to hypercapnic respiratory failure. Dobutamine was added to augment cardiac output to further diurese patient. The patient was in aflutter, EP was consulted. S/p cardioversion 7/16/24 with successful conversion to sinus rhythm. Pulm consulted for hypercapnia. Per pulm ok to remain on HFNC. Presentation concerning for end stage RA causing pum HTN, rheum consulted and recommendations appreciated. No therapies available, but rheum has ordered work up. Switched from IV to PO amio. Continued diamox. Patient to consider Palliative care. Palliative medicine consulted and patient expresses wishes for sister to be a part of discussions. The patient was trialed off  on 7/22, and failed.       Sister was reached and she stated that she would like her brother to come to [place]. The plan discussed was either we lower this patient's lasix and HFNC to an acceptable amount so he can be transported to [place] via helicopter or ambulance. If this  plan is unsuccessful then the plan would be to discharge this patient to hospice to [place]. However the patient is not wanting to consider hospice. Continued discussions resulted in the sister requesting that the patient be transported to [place] as a transfer to a hospital in [place]. If the transfer request fails then alternative is that patient is transferred to LTAC in [place].     Interval History: failed trial off  given worsening renal function. Restarted      ROS  Objective:     Vital Signs (Most Recent):  Temp: 98.4 °F (36.9 °C) (07/23/24 1101)  Pulse: 94 (07/23/24 1700)  Resp: (!) 26 (07/23/24 1700)  BP: 104/67 (07/23/24 1700)  SpO2: (!) 90 % (07/23/24 1700) Vital Signs (24h Range):  Temp:  [97.5 °F (36.4 °C)-98.8 °F (37.1 °C)] 98.4 °F (36.9 °C)  Pulse:  [82-94] 94  Resp:  [13-42] 26  SpO2:  [82 %-97 %] 90 %  BP: ()/(50-67) 104/67     Weight: 103.6 kg (228 lb 6.3 oz)  Body mass index is 39.2 kg/m².     SpO2: (!) 90 %         Intake/Output Summary (Last 24 hours) at 7/23/2024 1752  Last data filed at 7/23/2024 1700  Gross per 24 hour   Intake 1255.5 ml   Output 1985 ml   Net -729.5 ml       Lines/Drains/Airways       Peripherally Inserted Central Catheter Line  Duration             PICC Double Lumen 07/22/24 1120 right basilic 1 day              Peripheral Intravenous Line  Duration                  Peripheral IV - Single Lumen 07/20/24 2000 18 G Anterior;Proximal;Right Forearm 2 days                       Physical Exam  Vitals reviewed.   Constitutional:       General: He is not in acute distress.     Appearance: He is obese. He is ill-appearing.   Cardiovascular:      Rate and Rhythm: Normal rate and regular rhythm.   Pulmonary:      Effort: No respiratory distress.      Breath sounds: No rhonchi.   Abdominal:      General: Abdomen is flat.   Musculoskeletal:      Right lower leg: No edema.      Left lower leg: No edema.   Skin:     General: Skin is warm.   Neurological:      Mental Status: He is  "alert.            Significant Labs: All pertinent lab results from the last 24 hours have been reviewed.    Significant Imaging: Echocardiogram: 2D echo with color flow doppler: No results found. However, due to the size of the patient record, not all encounters were searched. Please check Results Review for a complete set of results.  Assessment and Plan:     * Right ventricular dysfunction  Acute on chronic. - NYHA class III symptoms with recurrent admissions.  - He was seen by [facility] outpatient 6/2024 who state patient was feeling better on new diuretic regimen however worried about the frequency of use of metolazone and his worsening kidney function.    - Admitted with CXR with cardiomegaly and pulmonary vascular congestion, suggestive of pulmonary edema secondary to CHF. BNP elevated at 800. Creatinine worse at 3.1 on admission.  - Given his recurrent HF admissions and most recent hemodynamics, [facility] believes his overall prognosis is poor and recommended palliative care consult. Palliative care evaluated patient. Patient wishes to continue full measures at this time.     - 2 gram sodium restriction and 1500cc fluid restriction.  - Encourage physical activity with graded exercise program.  - Acetazolamide PO for signs of contraction alkalosis   - lasix gtt   - Continue dobutamine gtt; weaned off 7/22 and failed.   - amio PO  - Diuril as needed  - Monitor lytes BID; replete as indicated    Acute decompensated heart failure  See "RV dysfunction" for plan    Atypical atrial flutter  - New onset  - On warfarin for Afib  - Patient on warfarin but has been subtherapeutic, will need to assess risk of cardioversion vs benefit to avoid HF in a patient with HF and tachycardia    - Continue AC as per Afib  - Started Amiodarone for rhythm control  - Monitor electrolytes  -continued in Aflutter consulted EP, s/p DCCV/DAWIT on 7/16      Metabolic alkalosis  Bicarb 40's in setting of aggressive diuresis. Suspect contraction " alkalosis.    - Continue diamox prn  - Trend Bicarb    Acute on chronic respiratory failure with hypoxia  Patient with Hypercapnic and Hypoxic Respiratory failure which is Acute on chronic.  he is on home oxygen at 3 LPM. Supplemental oxygen was provided and noted- Oxygen Concentration (%):  [50-60] 60    Signs/symptoms of respiratory failure include- tachypnea and increased work of breathing. Contributing diagnoses includes - CHF Labs and images were reviewed. Patient Has recent ABG, which has been reviewed.    -- Trend VBG BID  -- BiPap during night and naps; okay for HFNC throughout the day per pulm  -- Goal SpO2 > 88%    Acute on chronic right-sided congestive heart failure  - See 'RV dysfunction'    Advance care planning  See palliative care encounter plan     Paroxysmal A-fib  On coumadin.    Lab Results   Component Value Date    INR 2.2 (H) 07/23/2024    INR 2.0 (H) 07/22/2024    INR 2.1 (H) 07/21/2024     -- Goal INR 2-3  -- Titrate warfarin daily    -- EP consulted for Aflutter, cardioverted 7/16    Acute on chronic diastolic CHF (congestive heart failure), NYHA class 4  Patient is identified as having Systolic (HFrEF) heart failure that is Acute on chronic. CHF is currently uncontrolled due to Continued edema of extremities. Latest ECHO performed and demonstrates- Results for orders placed during the hospital encounter of 07/04/24    Echo    Interpretation Summary    Left Ventricle: Mild global hypokinesis present. Septal flattening in diastole and systole consistent with right ventricular volume and pressure overload. There is moderately reduced systolic function with a visually estimated ejection fraction of 35 - 40%. Grade I diastolic dysfunction. Eatimated CO is 3.4 l/min    Right Ventricle: Severe right ventricular enlargement. Wall thickness is normal. Right ventricle wall motion has global hypokinesis. Systolic function is severely reduced.    Right Atrium: Right atrium is severely dilated.     "Aortic Valve: The aortic valve is a trileaflet valve. There is mild aortic valve sclerosis. There is moderate annular calcification present.    Tricuspid Valve: There is moderate regurgitation with an anteromedial eccentrically directed jet.    Aorta: Aortic root is normal in size measuring 2.77 cm. Ascending aorta is normal measuring 2.51 cm.    Pulmonary Artery: There is severe pulmonary hypertension. The estimated pulmonary artery systolic pressure is 81 mmHg.    IVC/SVC: Elevated venous pressure at 15 mmHg.  . Continue Furosemide and monitor clinical status closely. Monitor on telemetry. Patient is off CHF pathway.  Monitor strict Is&Os and daily weights.  Place on fluid restriction of 1.5 L. Cardiology has been consulted. Continue to stress to patient importance of self efficacy and  on diet for CHF. Last BNP reviewed- and noted below No results for input(s): "BNP", "BNPTRIAGEBLO" in the last 168 hours.    Palliative care encounter  - patient not amenable to palliative options   - spoke to sister 7/22: stated that we will continue to try to wean HFNC and lasix to an acceptable amount to discharge on to [place], and if time limited trial is unsuccessful then will need to discharge the patient on hospice to [place]. The patient's sister states she will talk to the patient about considering hospice as an option. However this patient is not amenable to hospice. Plan is to wean transfer patient to a hospital in [place] and if that fails then transfer to an LTAC in [place], the patient's family is aware they would need to pay for transport.    MALLIKA (obstructive sleep apnea)  See 'hypoventilation syndrome'    Pulmonary hypertension  WHO group 2-3 per his managing pulmonologist (Danyell at [facility])  Adherent with diet, CPAP, and on continuous O2 3L at home.     -- RV dysfunction treatment as above  -- Pulm consulted, recs appreciated   -- Concern for RA per pulm, rheum consulted    Hypoventilation syndrome  BIPAP " QHS; NC during meals. Avoid HFNC.  Significant hypercapnia w/ pCO2 80-90; will start daytime BiPap    - BIPAP for ventilation preferred over high O2          VTE Risk Mitigation (From admission, onward)           Ordered     warfarin (COUMADIN) tablet 5 mg  Once per day on Sunday Monday Wednesday Friday Saturday 07/19/24 0830     IP VTE HIGH RISK PATIENT  Once         07/04/24 2302     Place sequential compression device  Until discontinued         07/04/24 2302                    Sheeba Baum DO  Cardiology  [facility] - [facility]

## 2024-07-23 NOTE — PROGRESS NOTES
[facility]  Palliative Medicine  Progress Note    Patient Name: Yong Mcintyre  MRN: [id number]  Admission Date: 7/4/2024  Hospital Length of Stay: 19 days  Code Status: Full Code   Attending Provider: Rossy Leary MD  Consulting Provider: Torrie Pretty MD  Primary Care Physician: Gianni Escalona MD  Principal Problem:Right ventricular dysfunction    Patient information was obtained from patient, relative(s), and primary team.      Assessment/Plan:     Palliative Care  Palliative care encounter  Pt is a 47 y/o male with severe RV failure 2/2 Pulmonary HTN.  Patient admitted almost 3 weeks ago for diuresis but has had worsening respiratory failure requiring escalating oxygen requirements during his hospital stay    Pulm HTN/ILD/paroxysmal a-fib/atrial flutter/acute on chronic right sides heart failure/acute hypoxic resp failure  -management per primary team and other consultants     Code status: Full     Surrogate decision maker: Has HCPOA document naming father. In meeting today he said he would want his sister Paula to be his surrogate decision maker.  Patient confirmed again today that he would like to go live with his sister in [place] and that she is planning on taking care of him.    GOC/ACP  7/23/24  We met with the patient, Dr. Patel and Dr. Leary from the CCU team and the patient's sister, Paula, via phone  We updated the patient and the sister on current challenges the patient is facing including most importantly his high oxygen requirement.  Sister emphasized repeatedly the importance of moving the patient to [place] as he would have considerably more support in that city than he does in [place].  We shared are worried that if the patient's oxygen requirement does not decreased, there may not be a feasible way to transport the patient.  We discussed different oxygen delivery devices and explained the limitations of them.  Sister requesting patient be  transferred to a hospital in the [place] area.  Dr. Leary shared her worries that the patient would not meet criteria for transfer to a new hospital system but assured the patient and his sister that she would make a referral to the transfer center.  We tried to discuss disposition options for the patient should the transfer request fail, sister and patient not really open to discussing alternative plans.  7/22/24  Patient feeling very hopeful about the future as he has noted improvements in his strength and function over the past couple of days, stating he was able to walk around for the 1st time.  Also he has had a decrease in his oxygen requirement from 60 to 40 L. he is insistent that he will leave the hospital, make it to [place] where he plans to live with his family.  He reports feeling like he has in a assisted here and states that he is eager to be discharged but does not want to be discharged until he is medically ready.  He does not feel like he is at the end of his life.  He is still hopeful to returned to working out at the gym, driving his new car.  When asked where he would like to be at the very end of his life, patient states that he would prefer to be in a hospital setting as he feels like he had get the best care there.  In terms of what kind of treatment he would want to the end of his life, he does not want to make any of those decisions right now and would want to defer to his family and their best judgment when that time comes.  7/19/24  -Met with patient at bedside. Introduced palliative medicine. He did not remember prior conversations with [facility]. Patient was very surprised to hear he was on 60L. After much explaining and reinforcing he was able to understand this is much more than his home 3-4L . He said he wished he would have known how bad things were. Said he was expecting to be discharged next week to drive his new car. His plan was to move to [place] to be closer to family.  Explained that unfortunately the amount of O2 he is on can only be done in the hospital. Began discussion of options moving forward - continuing what we are doing here in the hospital until he no longer finds it acceptable or decompensates further. Other option would be weaning it off with comfort meds in which he is likely to die here (did not go into detail about this). Pt says he feels like his parents would want to keep him alive forever even if on machines. When asked how he feels about this he said he is just hoping this doesn't actually happen.    Prognosis:  While I do believe that patient is on a dying trajectory, as supported by his frequent hospitalizations and decline in his overall function, it does seem possible that he will recover from this acute episode.      Summary/recommendations:  -Goals: patient's ultimate goal is to improve to the point of being independent, mobile, driving his truck, going to the gym.  He also wants to get to [place] to live with his family.  At this point in time, he does not seem capable of imagining a future in which he does not recover.   -Code status:  Full  -palliative care  is engaged in this case.  Suspect that the maximum oxygen the patient could be on for private transport would be 4-5 L.    -would consider LTAC placement for continue weaning of high-flow and rehab/PT/OT in the meantime  -Dr. Leary to request transfer of pt to hospital system vs LTACH in [place]        I will follow-up with patient. Please contact us if you have any additional questions.    Subjective:     Chief Complaint:   Chief Complaint   Patient presents with    Leg Swelling     On home oxygen 3l, gave self 3 puffs albuterol in triage, said got sob with walking        HPI:   Pt is a 48 y.o. male with severe pulmonary HTN (Group 2-3, on 3L home O2, diagnosed prior to 2015, follows with Dr. Child at [facility], MALLIKA, Obesity hypoventilation syndrome, HFpEF, Paroxysmal AFib (on  "Coumadin), BMI > 35, HTN who presents for worsening shortness of breath and lower extremity swelling over the past 2 weeks. This is his 5th admission this year. He reports compliance with his medications. His diuretic regimen was adjusted to the following; Torsemide 60 mg twice daily and metolazone 2.5 on M/WF/Sunday. Clinically reports NYHA class III symptoms. He reports his dry weight is 223lbs and is currently weighing in at 245lbs. He uses 3L of O2 at home and is currently on high flow 35L at 45%     Hospital Course:  No notes on file    Interval History:   -pt on about 45L, 50% FIO2 today  -still getting up and ambulating without much difficulty  -small increase in creatinine, restarted on dobutamine    Past Medical History:   Diagnosis Date    Arthritis     CHF (congestive heart failure)     Diastolic dysfunction    Cor pulmonale 11/05/2012    Gallstones     GERD (gastroesophageal reflux disease)     Hypertension     Morbid obesity 11/05/2012    Obesity hypoventilation syndrome     On home oxygen therapy 03/07/2014    MALLIKA (obstructive sleep apnea)     BPAP 16/11 with 3 L at night (continuous O2).    Paroxysmal atrial fibrillation     PFO (patent foramen ovale) 11/05/2012    status post closure    Pickwickian syndrome 11/05/2012    Pulmonary hypertension        Past Surgical History:   Procedure Laterality Date    CHOLECYSTECTOMY      RIGHT HEART CATHETERIZATION Right 03/22/2022    Procedure: INSERTION, CATHETER, RIGHT HEART;  Surgeon: Tony Martínez MD;  Location: [facility] CATH LAB;  Service: Cardiology;  Laterality: Right;    RIGHT HEART CATHETERIZATION Right 01/31/2024    Procedure: INSERTION, CATHETER, RIGHT HEART;  Surgeon: Sheri Ritter MD;  Location: [facility] CATH LAB;  Service: Cardiology;  Laterality: Right;    UPPER GASTROINTESTINAL ENDOSCOPY      2-3 years ago       Review of patient's allergies indicates:   Allergen Reactions    Lipitor [atorvastatin] Other (See Comments)     "it makes my legs feel like " "jelly"  Other reaction(s): causes legs to hurt       Medications:  Continuous Infusions:   DOBUTamine IV infusion (non-titrating)  5 mcg/kg/min Intravenous Continuous 7.8 mL/hr at 07/23/24 1500 5 mcg/kg/min at 07/23/24 1500    furosemide (Lasix) 500 mg in 50 mL infusion (conc: 10 mg/mL)  40 mg/hr Intravenous Continuous 4 mL/hr at 07/23/24 1500 40 mg/hr at 07/23/24 1500     Scheduled Meds:   albuterol  2 puff Inhalation Q6H    allopurinoL  300 mg Oral Daily    amiodarone  400 mg Oral BID    Followed by    [START ON 7/27/2024] amiodarone  200 mg Oral Daily    fluticasone-salmeterol 250-50 mcg/dose  1 puff Inhalation BID    pantoprazole  40 mg Oral Daily    QUEtiapine  50 mg Oral QHS    senna-docusate 8.6-50 mg  1 tablet Oral Daily    sodium chloride 0.9%  10 mL Intravenous Q6H    warfarin  5 mg Oral Once per day on Sunday Monday Wednesday Friday Saturday     PRN Meds:  Current Facility-Administered Medications:     0.9% NaCl, , Intravenous, PRN    0.9% NaCl, , Intravenous, PRN    acetaminophen, 650 mg, Oral, Q6H PRN    albuterol, 2 puff, Inhalation, Q4H PRN    aluminum & magnesium hydroxide-simethicone, 30 mL, Oral, Q6H PRN    glycerin adult, 1 suppository, Rectal, Daily PRN    melatonin, 6 mg, Oral, Nightly PRN    sodium chloride 0.9%, 10 mL, Intravenous, PRN    Flushing PICC/Midline Protocol, , , Until Discontinued **AND** sodium chloride 0.9%, 10 mL, Intravenous, Q6H **AND** sodium chloride 0.9%, 10 mL, Intravenous, PRN    Family History       Problem Relation (Age of Onset)    Arthritis Mother, Maternal Grandmother, Maternal Grandfather    Asthma Mother, Sister, Maternal Grandmother    Breast cancer Paternal Grandmother    Diabetes Maternal Grandmother    Down syndrome Brother    Gout Brother    Hypertension Father, Maternal Grandmother, Maternal Grandfather, Paternal Grandfather          Tobacco Use    Smoking status: Never    Smokeless tobacco: Never   Substance and Sexual Activity    Alcohol use: Yes     " Comment: holidays  rare    Drug use: No    Sexual activity: Not Currently     Partners: Female       Review of Systems  Objective:     Vital Signs (Most Recent):  Temp: 98.4 °F (36.9 °C) (07/23/24 1101)  Pulse: 90 (07/23/24 1500)  Resp: (!) 29 (07/23/24 1500)  BP: 112/61 (07/23/24 1500)  SpO2: (!) 92 % (07/23/24 1500) Vital Signs (24h Range):  Temp:  [97.5 °F (36.4 °C)-98.8 °F (37.1 °C)] 98.4 °F (36.9 °C)  Pulse:  [] 90  Resp:  [13-42] 29  SpO2:  [82 %-97 %] 92 %  BP: ()/(50-63) 112/61     Weight: 103.6 kg (228 lb 6.3 oz)  Body mass index is 39.2 kg/m².       Physical Exam  Vitals and nursing note reviewed.   Constitutional:       Interventions: Nasal cannula in place.   HENT:      Head: Normocephalic and atraumatic.   Cardiovascular:      Rate and Rhythm: Normal rate.   Pulmonary:      Effort: Pulmonary effort is normal. No respiratory distress.      Comments: Comfort flow on at 60 L 64 %  Abdominal:      General: There is distension.   Skin:     General: Skin is warm and dry.   Neurological:      General: No focal deficit present.      Mental Status: He is alert and oriented to person, place, and time.   Psychiatric:         Behavior: Behavior is cooperative.            Review of Symptoms      Symptom Assessment (ESAS 0-10 Scale)  Pain:  0  Dyspnea:  0  Anxiety:  0  Nausea:  0  Depression:  0  Anorexia:  0  Fatigue:  0  Insomnia:  0  Restlessness:  0  Agitation:  0         Performance Status:  70    Living Arrangements:  Lives alone    Psychosocial/Cultural:   See Palliative Psychosocial Note: No  Pt lives alone, no adult children. Pt's Dad lives in [facility] and pt's mother lives in [place]. Per pt, they are both aware of his medical issues they get along.   **Primary  to Follow**  Palliative Care  Consult: Yes        Advance Care Planning  Advance Directives:   Living Will: No    LaPOST: No    Do Not Resuscitate Status: No    Medical Power of : Yes    Goals of Care:  What is most important right now is to focus on extending life as long as possible, even it it means sacrificing quality, curative/life-prolongation (regardless of treatment burdens). Accordingly, we have decided that the best plan to meet the patient's goals includes continuing with treatment.         Significant Labs: All pertinent labs within the past 24 hours have been reviewed.  CBC:   Recent Labs   Lab 07/23/24 0319   WBC 5.94   HGB 12.9*   HCT 44.8   MCV 87        BMP:  Recent Labs   Lab 07/23/24  0319 07/23/24  0955   GLU 92 89    137   K 3.3* 3.8   CL 92* 93*   CO2 36* 34*   BUN 50* 52*   CREATININE 2.1* 2.4*   CALCIUM 9.5 9.9   MG 2.1  --      LFT:  Lab Results   Component Value Date    AST 18 07/23/2024    ALKPHOS 133 07/23/2024    BILITOT 0.8 07/23/2024     Albumin:   Albumin   Date Value Ref Range Status   07/23/2024 2.8 (L) 3.5 - 5.2 g/dL Final     Protein:   Total Protein   Date Value Ref Range Status   07/23/2024 7.9 6.0 - 8.4 g/dL Final     Lactic acid:   Lab Results   Component Value Date    LACTATE 1.1 07/05/2024    LACTATE 1.1 04/22/2024       Significant Imaging: I have reviewed all pertinent imaging results/findings within the past 24 hours.      CT chest 7/1Impression:     Similar appearance of diffuse ground-glass opacity, suggestive of pulmonary edema.     Dilatation of the main pulmonary artery compatible with pulmonary hypertension.     Cardiomegaly.   8/24      Torrie Pretty MD  Palliative Medicine  [facility] - [facility]

## 2024-07-23 NOTE — ASSESSMENT & PLAN NOTE
- patient not amenable to palliative options   - spoke to sister 7/22: stated that we will continue to try to wean HFNC and lasix to an acceptable amount to discharge on to [place], and if time limited trial is unsuccessful then will need to discharge the patient on hospice to [place]. The patient's sister states she will talk to the patient about considering hospice as an option. However this patient is not amenable to hospice. Plan is to wean transfer patient to a hospital in [place] and if that fails then transfer to an LTAC in [place], the patient's family is aware they would need to pay for transport.

## 2024-07-23 NOTE — ASSESSMENT & PLAN NOTE
WHO group 2-3 per his managing pulmonologist (Danyell at [facility])  Adherent with diet, CPAP, and on continuous O2 3L at home.     -- RV dysfunction treatment as above  -- Pulm consulted, recs appreciated   -- Concern for RA per pulm, rheum consulted

## 2024-07-23 NOTE — PT/OT/SLP PROGRESS
Physical Therapy Treatment and Plan of Care Change    Patient Name:  Yong Mcintyre   MRN:  [id number]    Recommendations:     Discharge Recommendations: Low Intensity Therapy  Discharge Equipment Recommendations: none  Barriers to discharge:  Increased respiratory support required    Assessment:     Yong Mcintyre is a 48 y.o. male admitted with a medical diagnosis of Right ventricular dysfunction.  He presents with the following impairments/functional limitations: impaired endurance, impaired functional mobility, decreased safety awareness, impaired cardiopulmonary response to activity, gait instability, impaired balance Pt was agreeable to PT today and tolerated session well. Session was limited to SPO2 dropping below goal of 88%. PT assessed that pt would benefit from 2x/wk of skilled therapy. Pt will continue to benefit from low intensity therapy once medically stable.     Rehab Prognosis: Good; patient would benefit from acute skilled PT services to address these deficits and reach maximum level of function.    Recent Surgery: Procedure(s) (LRB):  Cardioversion or Defibrillation (N/A) 7 Days Post-Op    Plan:     During this hospitalization, patient to be seen 2 x/week to address the identified rehab impairments via gait training, therapeutic activities and progress toward the following goals:    Plan of Care Expires:  08/11/24    Subjective     Chief Complaint: none at the moment  Patient/Family Comments/goals: to go home  Pain/Comfort:  Pain Rating 1: 0/10      Objective:     Communicated with RN, pulmonary, and RT prior to session.  Patient found up in chair with pulse ox (continuous), telemetry, blood pressure cuff, PICC line, peripheral IV, oxygen (CVP, AIRVO 45L 39%) upon PT entry to room.     General Precautions: Standard, fall  Orthopedic Precautions: N/A  Braces: N/A  Respiratory Status: High flow, flow 45 L/min, concentration 39%     Functional Mobility:  Transfers:     Sit to Stand:  stand by  assistance with no AD  Gait: Gait trained 80' in the room with high flow still attached to the wall. GT was limited due to O2 sats dropping to 87%     Session began with patient at 94% oxygen which is above his goal of 88%. After 4 laps in the room SPO2 dropped to 87%. Pt returned to seating and after 1.5 minutes SPO2 returned to 89%. After 4.5 minutes SPO2 returned to 91% and pt had a BP of 96/53. RN notified.     Pt white board updated with current therapists name and level of mobility assistance needed.         AM-PAC 6 CLICK MOBILITY  Turning over in bed (including adjusting bedclothes, sheets and blankets)?: 3  Sitting down on and standing up from a chair with arms (e.g., wheelchair, bedside commode, etc.): 3  Moving from lying on back to sitting on the side of the bed?: 3  Moving to and from a bed to a chair (including a wheelchair)?: 3  Need to walk in hospital room?: 3  Climbing 3-5 steps with a railing?: 3  Basic Mobility Total Score: 18       Treatment & Education:  Pt was given education on POC and verbally demonstrated understanding.     Patient left up in chair with all lines intact, call button in reach, and RN notified..    GOALS:   Multidisciplinary Problems       Physical Therapy Goals          Problem: Physical Therapy    Goal Priority Disciplines Outcome Goal Variances Interventions   Physical Therapy Goal     PT, PT/OT Progressing     Description: Goals to be met by: 2024     Patient will increase functional independence with mobility by performin. Supine to sit with [place]  2. Sit to stand transfer with [place]  3. Gait  x 150 feet with Supervision using No Assistive Device.   4. Ascend/descend 10 stair with right Handrails Supervision using No Assistive Device.                          Time Tracking:     PT Received On: 24  PT Start Time: 1137     PT Stop Time: 1155  PT Total Time (min): 18 min     Billable Minutes: Gait Training 18 min    Treatment Type:  Treatment  PT/PTA: PT     Number of PTA visits since last PT visit: 0     07/23/2024

## 2024-07-23 NOTE — ASSESSMENT & PLAN NOTE
Acute on chronic. - NYHA class III symptoms with recurrent admissions.  - He was seen by [facility] outpatient 6/2024 who state patient was feeling better on new diuretic regimen however worried about the frequency of use of metolazone and his worsening kidney function.    - Admitted with CXR with cardiomegaly and pulmonary vascular congestion, suggestive of pulmonary edema secondary to CHF. BNP elevated at 800. Creatinine worse at 3.1 on admission.  - Given his recurrent HF admissions and most recent hemodynamics, [facility] believes his overall prognosis is poor and recommended palliative care consult. Palliative care evaluated patient. Patient wishes to continue full measures at this time.     - 2 gram sodium restriction and 1500cc fluid restriction.  - Encourage physical activity with graded exercise program.  - Acetazolamide PO for signs of contraction alkalosis   - lasix gtt   - Continue dobutamine gtt; weaned off 7/22 and failed.   - amio PO  - Diuril as needed  - Monitor lytes BID; replete as indicated

## 2024-07-23 NOTE — PLAN OF CARE
CICU DAILY GOALS       A: Awake    RASS: Goal - RASS Goal: 0-->alert and calm  Actual - RASS (Carter Agitation-Sedation Scale): alert and calm   Restraint necessity:    B: Breath   SBT: Not intubated   C: Coordinate A & B, analgesics/sedatives   Pain: managed    SAT: Not intubated  D: Delirium   CAM-ICU: Overall CAM-ICU: Negative  E: Early(intubated/ Progressive (non-intubated) Mobility   MOVE Screen: Pass   Activity: Activity Management: Up in chair - L3  FAS: Feeding/Nutrition   Diet order: Diet/Nutrition Received: 2 gram sodium,   Fluid restriction: Fluid Requirement: 1500   Nutritional Supplement Intake: Quantity, Type:   T: Thrombus   DVT prophylaxis: VTE Required Core Measure: Pharmacological prophylaxis initiated/maintained  H: HOB Elevation   Head of Bed (HOB) Positioning: HOB at 60-90 degrees  U: Ulcer Prophylaxis   GI: yes  G: Glucose control   managed      S: Skin   Nurses Note -- 4 Eyes  Skin assessed during: Daily Assessment   CHOOSE ONE:  [x] No Altered Skin Integrity Present      []Prevention Measures Documented    [] Yes- Altered Skin Integrity Present or Discovered     [] LDA Added if Not in Epic (Describe Wound)     [] New Altered Skin Integrity was Present on Admit and Documented in LDA     [] Wound Image Taken  Wound Care Consulted? No  Attending Nurse:  María Natarajan RN/Staff Member:  Donna    B: Bowel Function   no issues   I: Indwelling Catheters   [place] necessity:     CVC necessity:    IPAD offered: Not appropriate  D: De-escalation Antibx   No  Plan for the day   Continuing to monitor. Educated on importance of adhering to fluid restriction (CVP 17, 15, 12). Monitor/replacing electrolytes. Walked in room with PT.     Family/Goals of care/Code Status   Code Status: Full Code     No acute events throughout day, VS and assessment per flow sheet, patient progressing towards goals as tolerated, plan of care reviewed with Yong Mcintyre and family, all concerns addressed, will continue to  monitor.

## 2024-07-24 LAB
ACE SERPL-CCNC: 39 U/L (ref 16–85)
ALBUMIN SERPL BCP-MCNC: 2.5 G/DL (ref 3.5–5.2)
ALLENS TEST: ABNORMAL
ALLENS TEST: ABNORMAL
ALP SERPL-CCNC: 127 U/L (ref 55–135)
ALT SERPL W/O P-5'-P-CCNC: 10 U/L (ref 10–44)
ANION GAP SERPL CALC-SCNC: 12 MMOL/L (ref 8–16)
ANION GAP SERPL CALC-SCNC: 6 MMOL/L (ref 8–16)
AST SERPL-CCNC: 16 U/L (ref 10–40)
BASOPHILS # BLD AUTO: 0.05 K/UL (ref 0–0.2)
BASOPHILS NFR BLD: 0.9 % (ref 0–1.9)
BILIRUB SERPL-MCNC: 0.8 MG/DL (ref 0.1–1)
BUN SERPL-MCNC: 50 MG/DL (ref 6–20)
BUN SERPL-MCNC: 54 MG/DL (ref 6–20)
CALCIUM SERPL-MCNC: 9.1 MG/DL (ref 8.7–10.5)
CALCIUM SERPL-MCNC: 9.1 MG/DL (ref 8.7–10.5)
CHLORIDE SERPL-SCNC: 94 MMOL/L (ref 95–110)
CHLORIDE SERPL-SCNC: 95 MMOL/L (ref 95–110)
CO2 SERPL-SCNC: 31 MMOL/L (ref 23–29)
CO2 SERPL-SCNC: 38 MMOL/L (ref 23–29)
CREAT SERPL-MCNC: 2.1 MG/DL (ref 0.5–1.4)
CREAT SERPL-MCNC: 2.3 MG/DL (ref 0.5–1.4)
CYANIDE BLD-MCNC: NORMAL MCG/ML
DELSYS: ABNORMAL
DIFFERENTIAL METHOD BLD: ABNORMAL
EOSINOPHIL # BLD AUTO: 0.1 K/UL (ref 0–0.5)
EOSINOPHIL NFR BLD: 2.4 % (ref 0–8)
ERYTHROCYTE [DISTWIDTH] IN BLOOD BY AUTOMATED COUNT: 22.3 % (ref 11.5–14.5)
EST. GFR  (NO RACE VARIABLE): 34.2 ML/MIN/1.73 M^2
EST. GFR  (NO RACE VARIABLE): 38.1 ML/MIN/1.73 M^2
FIO2: 40
FLOW: 40
GLUCOSE SERPL-MCNC: 125 MG/DL (ref 70–110)
GLUCOSE SERPL-MCNC: 140 MG/DL (ref 70–110)
HCO3 UR-SCNC: 39.2 MMOL/L (ref 24–28)
HCO3 UR-SCNC: 40.1 MMOL/L (ref 24–28)
HCT VFR BLD AUTO: 42.5 % (ref 40–54)
HGB BLD-MCNC: 12.2 G/DL (ref 14–18)
IMM GRANULOCYTES # BLD AUTO: 0.02 K/UL (ref 0–0.04)
IMM GRANULOCYTES NFR BLD AUTO: 0.3 % (ref 0–0.5)
INR PPP: 2.1 (ref 0.8–1.2)
LYMPHOCYTES # BLD AUTO: 1.2 K/UL (ref 1–4.8)
LYMPHOCYTES NFR BLD: 20.3 % (ref 18–48)
MAGNESIUM SERPL-MCNC: 1.8 MG/DL (ref 1.6–2.6)
MCH RBC QN AUTO: 24.7 PG (ref 27–31)
MCHC RBC AUTO-ENTMCNC: 28.7 G/DL (ref 32–36)
MCV RBC AUTO: 86 FL (ref 82–98)
MODE: ABNORMAL
MONOCYTES # BLD AUTO: 0.5 K/UL (ref 0.3–1)
MONOCYTES NFR BLD: 8.8 % (ref 4–15)
NEUTROPHILS # BLD AUTO: 3.9 K/UL (ref 1.8–7.7)
NEUTROPHILS NFR BLD: 67.3 % (ref 38–73)
NRBC BLD-RTO: 0 /100 WBC
PCO2 BLDA: 68.4 MMHG (ref 35–45)
PCO2 BLDA: 74.6 MMHG (ref 35–45)
PH SMN: 7.34 [PH] (ref 7.35–7.45)
PH SMN: 7.37 [PH] (ref 7.35–7.45)
PHOSPHATE SERPL-MCNC: 3.8 MG/DL (ref 2.7–4.5)
PLATELET # BLD AUTO: 191 K/UL (ref 150–450)
PMV BLD AUTO: 10.6 FL (ref 9.2–12.9)
PO2 BLDA: 31 MMHG (ref 40–60)
PO2 BLDA: 35 MMHG (ref 40–60)
POC BE: 14 MMOL/L
POC BE: 14 MMOL/L
POC SATURATED O2: 53 % (ref 95–100)
POC SATURATED O2: 61 % (ref 95–100)
POC TCO2: 41 MMOL/L (ref 24–29)
POC TCO2: 42 MMOL/L (ref 24–29)
POTASSIUM SERPL-SCNC: 3.4 MMOL/L (ref 3.5–5.1)
POTASSIUM SERPL-SCNC: 3.7 MMOL/L (ref 3.5–5.1)
PROT SERPL-MCNC: 7.3 G/DL (ref 6–8.4)
PROTEINASE3 IGG SER-ACNC: <0.2 U
PROTHROMBIN TIME: 22.1 SEC (ref 9–12.5)
RBC # BLD AUTO: 4.93 M/UL (ref 4.6–6.2)
REF LAB TEST REF RANGE: 0.05 MCG/ML
SAMPLE: ABNORMAL
SAMPLE: ABNORMAL
SITE: ABNORMAL
SITE: ABNORMAL
SODIUM SERPL-SCNC: 137 MMOL/L (ref 136–145)
SODIUM SERPL-SCNC: 139 MMOL/L (ref 136–145)
WBC # BLD AUTO: 5.8 K/UL (ref 3.9–12.7)

## 2024-07-24 PROCEDURE — 85025 COMPLETE CBC W/AUTO DIFF WBC: CPT

## 2024-07-24 PROCEDURE — 63600175 PHARM REV CODE 636 W HCPCS

## 2024-07-24 PROCEDURE — 25000003 PHARM REV CODE 250

## 2024-07-24 PROCEDURE — 25000242 PHARM REV CODE 250 ALT 637 W/ HCPCS

## 2024-07-24 PROCEDURE — 94799 UNLISTED PULMONARY SVC/PX: CPT

## 2024-07-24 PROCEDURE — 99231 SBSQ HOSP IP/OBS SF/LOW 25: CPT | Mod: GC,,, | Performed by: INTERNAL MEDICINE

## 2024-07-24 PROCEDURE — 27100171 HC OXYGEN HIGH FLOW UP TO 24 HOURS

## 2024-07-24 PROCEDURE — 82803 BLOOD GASES ANY COMBINATION: CPT

## 2024-07-24 PROCEDURE — 99232 SBSQ HOSP IP/OBS MODERATE 35: CPT | Mod: GC,,, | Performed by: INTERNAL MEDICINE

## 2024-07-24 PROCEDURE — 97535 SELF CARE MNGMENT TRAINING: CPT

## 2024-07-24 PROCEDURE — 99233 SBSQ HOSP IP/OBS HIGH 50: CPT | Mod: GC,,, | Performed by: INTERNAL MEDICINE

## 2024-07-24 PROCEDURE — 25000242 PHARM REV CODE 250 ALT 637 W/ HCPCS: Performed by: STUDENT IN AN ORGANIZED HEALTH CARE EDUCATION/TRAINING PROGRAM

## 2024-07-24 PROCEDURE — 63600175 PHARM REV CODE 636 W HCPCS: Performed by: STUDENT IN AN ORGANIZED HEALTH CARE EDUCATION/TRAINING PROGRAM

## 2024-07-24 PROCEDURE — 94660 CPAP INITIATION&MGMT: CPT

## 2024-07-24 PROCEDURE — 85610 PROTHROMBIN TIME: CPT

## 2024-07-24 PROCEDURE — 94761 N-INVAS EAR/PLS OXIMETRY MLT: CPT | Mod: XB

## 2024-07-24 PROCEDURE — 99900035 HC TECH TIME PER 15 MIN (STAT)

## 2024-07-24 PROCEDURE — 20600001 HC STEP DOWN PRIVATE ROOM

## 2024-07-24 PROCEDURE — 84100 ASSAY OF PHOSPHORUS: CPT

## 2024-07-24 PROCEDURE — 25000003 PHARM REV CODE 250: Performed by: STUDENT IN AN ORGANIZED HEALTH CARE EDUCATION/TRAINING PROGRAM

## 2024-07-24 PROCEDURE — 80048 BASIC METABOLIC PNL TOTAL CA: CPT | Mod: XB | Performed by: STUDENT IN AN ORGANIZED HEALTH CARE EDUCATION/TRAINING PROGRAM

## 2024-07-24 PROCEDURE — 83735 ASSAY OF MAGNESIUM: CPT

## 2024-07-24 PROCEDURE — 80053 COMPREHEN METABOLIC PANEL: CPT

## 2024-07-24 PROCEDURE — A4216 STERILE WATER/SALINE, 10 ML: HCPCS | Performed by: INTERNAL MEDICINE

## 2024-07-24 PROCEDURE — 25000003 PHARM REV CODE 250: Performed by: INTERNAL MEDICINE

## 2024-07-24 RX ORDER — IPRATROPIUM BROMIDE AND ALBUTEROL SULFATE 2.5; .5 MG/3ML; MG/3ML
3 SOLUTION RESPIRATORY (INHALATION) ONCE
Status: COMPLETED | OUTPATIENT
Start: 2024-07-24 | End: 2024-07-24

## 2024-07-24 RX ORDER — WARFARIN SODIUM 5 MG/1
5 TABLET ORAL
Status: DISCONTINUED | OUTPATIENT
Start: 2024-07-24 | End: 2024-07-29 | Stop reason: HOSPADM

## 2024-07-24 RX ORDER — MAGNESIUM SULFATE 1 G/100ML
1 INJECTION INTRAVENOUS ONCE
Status: COMPLETED | OUTPATIENT
Start: 2024-07-24 | End: 2024-07-24

## 2024-07-24 RX ORDER — ALBUTEROL SULFATE 90 UG/1
2 AEROSOL, METERED RESPIRATORY (INHALATION) EVERY 6 HOURS
Start: 2024-07-25 | End: 2025-07-25

## 2024-07-24 RX ORDER — ACETAMINOPHEN 325 MG/1
650 TABLET ORAL EVERY 6 HOURS PRN
Start: 2024-07-24

## 2024-07-24 RX ORDER — WARFARIN SODIUM 5 MG/1
TABLET ORAL
Start: 2024-07-26

## 2024-07-24 RX ORDER — AMOXICILLIN 250 MG
1 CAPSULE ORAL DAILY
Start: 2024-07-25

## 2024-07-24 RX ORDER — ALBUTEROL SULFATE 90 UG/1
2 AEROSOL, METERED RESPIRATORY (INHALATION) EVERY 4 HOURS PRN
Start: 2024-07-24 | End: 2025-07-24

## 2024-07-24 RX ORDER — QUETIAPINE FUMARATE 50 MG/1
50 TABLET, FILM COATED ORAL NIGHTLY
Start: 2024-07-25 | End: 2025-07-25

## 2024-07-24 RX ORDER — AMIODARONE HYDROCHLORIDE 200 MG/1
400 TABLET ORAL 2 TIMES DAILY
Start: 2024-07-25 | End: 2024-07-28

## 2024-07-24 RX ORDER — POTASSIUM CHLORIDE 20 MEQ/1
20 TABLET, EXTENDED RELEASE ORAL ONCE
Status: COMPLETED | OUTPATIENT
Start: 2024-07-24 | End: 2024-07-24

## 2024-07-24 RX ORDER — AMIODARONE HYDROCHLORIDE 200 MG/1
200 TABLET ORAL DAILY
Start: 2024-07-28 | End: 2025-07-28

## 2024-07-24 RX ORDER — POTASSIUM CHLORIDE 29.8 MG/ML
40 INJECTION INTRAVENOUS ONCE
Status: COMPLETED | OUTPATIENT
Start: 2024-07-24 | End: 2024-07-24

## 2024-07-24 RX ORDER — DOBUTAMINE HYDROCHLORIDE 400 MG/100ML
5 INJECTION INTRAVENOUS CONTINUOUS
Start: 2024-07-24

## 2024-07-24 RX ADMIN — Medication 10 ML: at 05:07

## 2024-07-24 RX ADMIN — POTASSIUM CHLORIDE 20 MEQ: 1500 TABLET, EXTENDED RELEASE ORAL at 06:07

## 2024-07-24 RX ADMIN — ALLOPURINOL 300 MG: 100 TABLET ORAL at 09:07

## 2024-07-24 RX ADMIN — AMIODARONE HYDROCHLORIDE 400 MG: 200 TABLET ORAL at 08:07

## 2024-07-24 RX ADMIN — AMIODARONE HYDROCHLORIDE 400 MG: 200 TABLET ORAL at 09:07

## 2024-07-24 RX ADMIN — IPRATROPIUM BROMIDE AND ALBUTEROL SULFATE 3 ML: 2.5; .5 SOLUTION RESPIRATORY (INHALATION) at 12:07

## 2024-07-24 RX ADMIN — FLUTICASONE PROPIONATE AND SALMETEROL 1 PUFF: 50; 250 POWDER RESPIRATORY (INHALATION) at 07:07

## 2024-07-24 RX ADMIN — POTASSIUM CHLORIDE 40 MEQ: 29.8 INJECTION, SOLUTION INTRAVENOUS at 06:07

## 2024-07-24 RX ADMIN — FUROSEMIDE 40 MG/HR: 10 INJECTION, SOLUTION INTRAMUSCULAR; INTRAVENOUS at 12:07

## 2024-07-24 RX ADMIN — QUETIAPINE FUMARATE 50 MG: 25 TABLET ORAL at 08:07

## 2024-07-24 RX ADMIN — PANTOPRAZOLE SODIUM 40 MG: 40 TABLET, DELAYED RELEASE ORAL at 09:07

## 2024-07-24 RX ADMIN — MAGNESIUM SULFATE HEPTAHYDRATE 1 G: 500 INJECTION, SOLUTION INTRAMUSCULAR; INTRAVENOUS at 06:07

## 2024-07-24 RX ADMIN — WARFARIN SODIUM 5 MG: 5 TABLET ORAL at 05:07

## 2024-07-24 RX ADMIN — ALBUTEROL SULFATE 2 PUFF: 108 INHALANT RESPIRATORY (INHALATION) at 07:07

## 2024-07-24 RX ADMIN — DOBUTAMINE HYDROCHLORIDE 5 MCG/KG/MIN: 400 INJECTION INTRAVENOUS at 09:07

## 2024-07-24 RX ADMIN — ALBUTEROL SULFATE 2 PUFF: 108 INHALANT RESPIRATORY (INHALATION) at 01:07

## 2024-07-24 RX ADMIN — SENNOSIDES AND DOCUSATE SODIUM 1 TABLET: 50; 8.6 TABLET ORAL at 09:07

## 2024-07-24 NOTE — ASSESSMENT & PLAN NOTE
Patient with a history of chronic CO2 retention, has labs with elevated bicarb for years (baseline appears upper 30's) on home NIV. Patient is adherent with home O2 and BiPAP. Per notes from [facility], there was some concern for inadequate ventilatory support at home due to worsening bicarb and Hgb, however patient has had multiple hospitalizations in the past calendar year alone for right heart failure. Most recent Echo confirming very high pulmonary artery systolic pressure, and some RV dysfunction but not severely dilating to the point of impeding left heart function. Patient's hypercapnia is multifactorial. Baseline ventilatory defect is present (severe obstruction on prior PFTs in the [facility] system) as well as sheer ventilatory restriction from significantly enlarged heart. Patient's baseline CO2 is hard to determine, but has been in the 70's with normal PH's on arterial blood gases. Patient has been appropriately aggressively diuresed during this hospitalization and may have developed a contraction alkalosis. It's likely some aspect of this increase in CO2 over the past few days is a compensatory response to his alkalosis. Has started diamox for assisted diuresis. Overall, patient has multiple severely morbid and high mortality conditions.     - ABG on 7/17 with normal pH of 7.37 and pCO2 of 70. Patient's pCO2 is at its baseline, patient with normal pH and pCO2 in the upper 70's and lower 80's on venous blood gas. This is a sign of well compensated chronic hypercapnic respiratory failure. Patient does not need continuous BiPAP, will only need it while he is sleeping. My bigger concern is patient's oxygenation, rather than his ventilation. He is requiring a high amount of support to maintain appropriate saturations. The patient's goal O2 saturation should be 88-92% and we should utilize whatever oxygen delivery system is needed to achieve those goals, including Comfort flow / high flow nasal cannula. Hypoxemia will  worsen patient's pulmonary hypertension.    - CTD workup ordered. RF has returned very high. He does appear to have parenchymal abnormalities on CT persistently with what appear to be cysts. 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 There is significant mosaicism on his CT previously which could be consistent with air trapping (prior PFTs with significantly elevated RV-TLC ratio consistent with air trapping), however PVOD / PH can have similar changes.    Recommendations  - Would continue diuresis   - BiPAP at night and with naps, otherwise OK for high-flow nasal cannula throughout the day. Would continue to wean HFNC  - Trial of HFNC with bubble humidifier   - Goal sat is 88-92%   - Continue bronchodilators   - Nebulizers prn  - Will follow up rheumatologic workup

## 2024-07-24 NOTE — PROGRESS NOTES
[facility] - Transplant Stepdown  Cardiology  Progress Note    Patient Name: Yong Mcintyre  MRN: [id number]  Admission Date: 7/4/2024  Hospital Length of Stay: 20 days  Code Status: Full Code   Attending Physician: Geeta Mendenhall MD   Primary Care Physician: Gianni Escalona MD  Expected Discharge Date: 7/26/2024  Principal Problem:Right ventricular dysfunction    Subjective:     Hospital Course:   The patient was admitted to the CCU for further management of right-sided heart failure. He was diuresed with a lasix gtt. Electrolytes were monitored and repleted as indicated. Palliative care was consulted given his poor prognosis. The patient had significant O2 requirements on admission which were slow to wean. A central line was placed for close CVP monitoring in the setting of aggressive diuresis. Nutrition consult placed for low salt diet education. Acetazolamide was added to his diuretic regimen due to persistent metabolic alkalosis. The patient required intermittent Bipap due to hypercapnic respiratory failure. Dobutamine was added to augment cardiac output to further diurese patient. The patient was in aflutter, EP was consulted. S/p cardioversion 7/16/24 with successful conversion to sinus rhythm. Pulm consulted for hypercapnia. Per pulm ok to remain on HFNC. Presentation concerning for end stage RA causing pum HTN, rheum consulted and recommendations appreciated. No therapies available, but rheum has ordered work up. Switched from IV to PO amio. Continued diamox. Patient to consider Palliative care. Palliative medicine consulted and patient expresses wishes for sister to be a part of discussions. The patient was trialed off  on 7/22, and failed.       Sister was reached and she stated that she would like her brother to come to [place]. The plan discussed was either we lower this patient's lasix and HFNC to an acceptable amount so he can be transported to [place] via helicopter or ambulance. If this plan is  unsuccessful then the plan would be to discharge this patient to hospice to [place]. However the patient is not wanting to consider hospice. Continued discussions resulted in the sister requesting that the patient be transported to [place] as a transfer to a hospital in [place]. If the transfer request fails then alternative is that patient is transferred to LTAC in [place]. The transfer to [facility] in [place] was denied by facility 2/2 to end stage disease and BMI. The patient was then started on the process to transfer to an LTAC in [place]. He was stable for step down to the floor while on HFNC.     Interval History: NAEON. On  and lasix.     ROS  Objective:     Vital Signs (Most Recent):  Temp: 98.5 °F (36.9 °C) (07/24/24 1548)  Pulse: 86 (07/24/24 1548)  Resp: (!) 22 (07/24/24 1548)  BP: (!) 93/50 (07/24/24 1548)  SpO2: 95 % (07/24/24 1548) Vital Signs (24h Range):  Temp:  [97.6 °F (36.4 °C)-98.6 °F (37 °C)] 98.5 °F (36.9 °C)  Pulse:  [81-97] 86  Resp:  [13-30] 22  SpO2:  [88 %-96 %] 95 %  BP: ()/(50-68) 93/50     Weight: 103.6 kg (228 lb 6.3 oz)  Body mass index is 39.2 kg/m².     SpO2: 95 %         Intake/Output Summary (Last 24 hours) at 7/24/2024 1645  Last data filed at 7/24/2024 1505  Gross per 24 hour   Intake 1370.91 ml   Output 2850 ml   Net -1479.09 ml       Lines/Drains/Airways       Peripherally Inserted Central Catheter Line  Duration             PICC Double Lumen 07/22/24 1120 right basilic 2 days              Peripheral Intravenous Line  Duration                  Peripheral IV - Single Lumen 07/20/24 2000 18 G Anterior;Proximal;Right Forearm 3 days                       Physical Exam  Vitals reviewed.   Constitutional:       General: He is not in acute distress.     Appearance: He is obese. He is ill-appearing.   Cardiovascular:      Rate and Rhythm: Normal rate and regular rhythm.   Pulmonary:      Effort: No respiratory distress.      Breath sounds: No rhonchi.      Comments:  "On HFNC   Abdominal:      General: Abdomen is flat.   Musculoskeletal:      Right lower leg: No edema.      Left lower leg: No edema.   Skin:     General: Skin is warm.   Neurological:      Mental Status: He is alert.            Significant Labs: All pertinent lab results from the last 24 hours have been reviewed.    Significant Imaging: Echocardiogram: 2D echo with color flow doppler: No results found. However, due to the size of the patient record, not all encounters were searched. Please check Results Review for a complete set of results.  Assessment and Plan:       * Right ventricular dysfunction  Acute on chronic. - NYHA class III symptoms with recurrent admissions.  - He was seen by [facility] outpatient 6/2024 who state patient was feeling better on new diuretic regimen however worried about the frequency of use of metolazone and his worsening kidney function.    - Admitted with CXR with cardiomegaly and pulmonary vascular congestion, suggestive of pulmonary edema secondary to CHF. BNP elevated at 800. Creatinine worse at 3.1 on admission.  - Given his recurrent HF admissions and most recent hemodynamics, [facility] believes his overall prognosis is poor and recommended palliative care consult. Palliative care evaluated patient. Patient wishes to continue full measures at this time.     - 2 gram sodium restriction and 1500cc fluid restriction.  - Encourage physical activity with graded exercise program.  - Acetazolamide PO as needed for signs of contraction alkalosis   - lasix gtt   - Continue dobutamine gtt; weaned off 7/22 and failed.   - amio PO  - Diuril as needed  - Monitor lytes BID; replete as indicated    Acute decompensated heart failure  See "RV dysfunction" for plan    Atypical atrial flutter  - New onset  - On warfarin for Afib  - Patient on warfarin but has been subtherapeutic, will need to assess risk of cardioversion vs benefit to avoid HF in a patient with HF and tachycardia    - Continue AC as per Afib  - " Started Amiodarone for rhythm control  - Monitor electrolytes  -continued in Aflutter consulted EP, s/p DCCV/DAWIT on 7/16      Metabolic alkalosis  Bicarb 40's in setting of aggressive diuresis. Suspect contraction alkalosis.    - Continue diamox prn  - Trend Bicarb    Acute on chronic respiratory failure with hypoxia  Patient with Hypercapnic and Hypoxic Respiratory failure which is Acute on chronic.  he is on home oxygen at 3 LPM. Supplemental oxygen was provided and noted- Oxygen Concentration (%):  [50-60] 60    Signs/symptoms of respiratory failure include- tachypnea and increased work of breathing. Contributing diagnoses includes - CHF Labs and images were reviewed. Patient Has recent ABG, which has been reviewed.    -- Trend VBG BID  -- BiPap during night and naps; okay for HFNC throughout the day per pulm  -- Goal SpO2 > 88%    Acute on chronic right-sided congestive heart failure  - See 'RV dysfunction'    Advance care planning  See palliative care encounter plan     Paroxysmal A-fib  On coumadin.    Lab Results   Component Value Date    INR 2.1 (H) 07/24/2024    INR 2.2 (H) 07/23/2024    INR 2.0 (H) 07/22/2024     -- Goal INR 2-3  -- Titrate warfarin daily    -- EP consulted for Radha, cardioverted 7/16    Acute on chronic diastolic CHF (congestive heart failure), NYHA class 4  Patient is identified as having Systolic (HFrEF) heart failure that is Acute on chronic. CHF is currently uncontrolled due to Continued edema of extremities. Latest ECHO performed and demonstrates- Results for orders placed during the hospital encounter of 07/04/24    Echo    Interpretation Summary    Left Ventricle: Mild global hypokinesis present. Septal flattening in diastole and systole consistent with right ventricular volume and pressure overload. There is moderately reduced systolic function with a visually estimated ejection fraction of 35 - 40%. Grade I diastolic dysfunction. Eatimated CO is 3.4 l/min    Right Ventricle:  "Severe right ventricular enlargement. Wall thickness is normal. Right ventricle wall motion has global hypokinesis. Systolic function is severely reduced.    Right Atrium: Right atrium is severely dilated.    Aortic Valve: The aortic valve is a trileaflet valve. There is mild aortic valve sclerosis. There is moderate annular calcification present.    Tricuspid Valve: There is moderate regurgitation with an anteromedial eccentrically directed jet.    Aorta: Aortic root is normal in size measuring 2.77 cm. Ascending aorta is normal measuring 2.51 cm.    Pulmonary Artery: There is severe pulmonary hypertension. The estimated pulmonary artery systolic pressure is 81 mmHg.    IVC/SVC: Elevated venous pressure at 15 mmHg.  . Continue Furosemide and monitor clinical status closely. Monitor on telemetry. Patient is off CHF pathway.  Monitor strict Is&Os and daily weights.  Place on fluid restriction of 1.5 L. Cardiology has been consulted. Continue to stress to patient importance of self efficacy and  on diet for CHF. Last BNP reviewed- and noted below No results for input(s): "BNP", "BNPTRIAGEBLO" in the last 168 hours.    Palliative care encounter  - patient not amenable to palliative options   - spoke to sister 7/22: stated that we will continue to try to wean HFNC and lasix to an acceptable amount to discharge on to [place], and if time limited trial is unsuccessful then will need to discharge the patient on hospice to [place]. The patient's sister states she will talk to the patient about considering hospice as an option. However this patient is not amenable to hospice. Plan is to wean transfer patient to a hospital in [place] and if that fails then transfer to an LTAC in [place], the patient's family is aware they would need to pay for transport.   - Transfer request placed by Dr. Leary to Dr. Wheatley at [facility] for second opinion of heart and lung transplant. The pt was denied transfer by the " facility given end stage disease and BMI. The patient is now discussing options for LTAC in [place].     MALLIKA (obstructive sleep apnea)  See 'hypoventilation syndrome'    Pulmonary hypertension  WHO group 2-3 per his managing pulmonologist (Danyell at [facility])  Adherent with diet, CPAP, and on continuous O2 3L at home.     -- RV dysfunction treatment as above  -- Pulm consulted, recs appreciated   -- Concern for RA per pulm, rheum consulted. No options for treatment given patient is end stage.     Hypoventilation syndrome  BIPAP QHS; NC during meals. Avoid HFNC.  Significant hypercapnia w/ pCO2 80-90; will start daytime BiPap    - BIPAP for ventilation preferred over high O2          VTE Risk Mitigation (From admission, onward)           Ordered     warfarin (COUMADIN) tablet 5 mg  Once per day on Sunday Monday Tuesday Wednesday Friday Saturday 07/24/24 1125     IP VTE HIGH RISK PATIENT  Once         07/04/24 2302     Place sequential compression device  Until discontinued         07/04/24 2302                    Sheeba Baum,   Cardiology  [facility] - Transplant Stepdown

## 2024-07-24 NOTE — ASSESSMENT & PLAN NOTE
Patient with a history of chronic CO2 retention, has labs with elevated bicarb for years (baseline appears upper 30's) on home NIV. Patient is adherent with home O2 and BiPAP. Per notes from [facility], there was some concern for inadequate ventilatory support at home due to worsening bicarb and Hgb, however patient has had multiple hospitalizations in the past calendar year alone for right heart failure. Most recent Echo confirming very high pulmonary artery systolic pressure, and some RV dysfunction but not severely dilating to the point of impeding left heart function. Patient's hypercapnia is multifactorial. Baseline ventilatory defect is present (severe obstruction on [place] [facility] [place] as well as sheer ventilatory restriction from significantly enlarged heart. Patient's baseline CO2 is hard to determine, but has been in the 70's with normal PH's on arterial blood gases. Patient has been appropriately aggressively diuresed during this hospitalization and may have developed a contraction alkalosis. It's likely some aspect of this increase in CO2 over the past few days is a compensatory response to his alkalosis. Has started diamox for assisted diuresis. Overall, patient has multiple severely morbid and high mortality conditions.     - ABG on 7/17 with normal pH of 7.37 and pCO2 of 70. Patient's pCO2 is at its baseline, patient with normal pH and pCO2 in the upper 70's and lower 80's on venous blood gas. This is a sign of well compensated chronic hypercapnic respiratory failure. Patient does not need continuous BiPAP, will only need it while he is sleeping. My bigger concern is patient's oxygenation, rather than his ventilation. He is requiring a high amount of support to maintain appropriate saturations. The patient's goal O2 saturation should be 88-92% and we should utilize whatever oxygen delivery system is needed to achieve those goals, including Comfort flow / high flow nasal cannula. Hypoxemia will  worsen patient's pulmonary hypertension.    - CTD workup ordered. RF has returned very high. He does appear to have parenchymal abnormalities on CT persistently with what appear to be cysts. 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 There is significant mosaicism on his CT previously which could be consistent with air trapping (prior PFTs with significantly elevated RV-TLC ratio consistent with air trapping), however PVOD / PH can have similar changes.    Recommendations  - Would continue diuresis   - BiPAP at night and with naps, otherwise OK for high-flow nasal cannula throughout the day. Would continue to wean HFNC  - Trial of HFNC with bubble humidifier   - Goal sat is 88-92%   - Continue bronchodilators   - Nebulizers prn  - Will follow up rheumatologic workup  - Can hold off on getting daily CXR unless pertinent

## 2024-07-24 NOTE — ASSESSMENT & PLAN NOTE
Acute on chronic. - NYHA class III symptoms with recurrent admissions.  - He was seen by [facility] outpatient 6/2024 who state patient was feeling better on new diuretic regimen however worried about the frequency of use of metolazone and his worsening kidney function.    - Admitted with CXR with cardiomegaly and pulmonary vascular congestion, suggestive of pulmonary edema secondary to CHF. BNP elevated at 800. Creatinine worse at 3.1 on admission.  - Given his recurrent HF admissions and most recent hemodynamics, [facility] believes his overall prognosis is poor and recommended palliative care consult. Palliative care evaluated patient. Patient wishes to continue full measures at this time.     - 2 gram sodium restriction and 1500cc fluid restriction.  - Encourage physical activity with graded exercise program.  - Acetazolamide PO as needed for signs of contraction alkalosis   - lasix gtt   - Continue dobutamine gtt; weaned off 7/22 and failed.   - amio PO  - Diuril as needed  - Monitor lytes BID; replete as indicated

## 2024-07-24 NOTE — PT/OT/SLP PROGRESS
"Occupational Therapy   Treatment    Name: Yong Mcintyre  MRN: [id number]  Admitting Diagnosis:  Right ventricular dysfunction  8 Days Post-Op    Recommendations:     Discharge Recommendations: Low Intensity Therapy  Discharge Equipment Recommendations:  none  Barriers to discharge:  None    Assessment:     Yong Mcintyre is a 48 y.o. male with a medical diagnosis of Right ventricular dysfunction.  He presents with the following performance deficits affecting function: impaired endurance, impaired self care skills, impaired functional mobility, impaired cardiopulmonary response to activity, gait instability, impaired balance. Pt willing to participate and tolerated session well overall. Per RN and respiratory therapist, high flow to stay plugged into wall which limited mobility in session. Pt able to perform sit>stands, hygiene tasks without assistance.     Rehab Prognosis:  Good; patient would benefit from acute skilled OT services to address these deficits and reach maximum level of function.       Plan:     Patient to be seen 4 x/week to address the above listed problems via self-care/home management, therapeutic activities, therapeutic exercises, neuromuscular re-education  Plan of Care Expires: 08/21/24  Plan of Care Reviewed with: patient    Subjective     Chief Complaint: none stated  Patient/Family Comments/goals: "I want to get up and move more."  Pain/Comfort:  Pain Rating 1: 0/10  Pain Rating Post-Intervention 1: 0/10    Objective:     Communicated with: RN prior to session.  Patient found up in chair with telemetry, pulse ox (continuous), PICC line, oxygen, peripheral IV (AIRVO 45L 39%) upon OT entry to room.    General Precautions: Standard, fall    Orthopedic Precautions:N/A  Braces: N/A  Respiratory Status: High flow, flow 45 L/min, concentration 39%     Occupational Performance:     Bed Mobility:    Pt started and finished session in chair     Functional Mobility/Transfers:  Patient completed Sit <> " Stand Transfer with stand by assistance  with  no assistive device   Functional Mobility: not attempted 2/2 to high flow 02 staying in wall    Activities of Daily Living:  Grooming: setup; brushed teeth, washed face seated in chair      Ther Ex:  Sit>stand x 5 trials     [facility] 6 Click ADL: 21    Treatment & Education:  Pt edu on role of OT, POC, safety when performing self care tasks , benefit of performing OOB activity, and safety when performing functional transfers and mobility.  - White board updated  - Self care tasks completed-- as noted above      Patient left up in chair with all lines intact, call button in reach, and RN notified    GOALS:   Multidisciplinary Problems       Occupational Therapy Goals          Problem: Occupational Therapy    Goal Priority Disciplines Outcome Interventions   Occupational Therapy Goal     OT, PT/OT Progressing    Description: Goals to be met by: 8/21/24     Patient will increase functional independence with ADLs by performing:    LE Dressing with Modified [place].  Grooming while standing at sink with Modified [place].  Toileting from toilet/bedside commode with Modified [place] for hygiene and clothing management.   Step transfer with Modified [place]  Toilet transfer to toilet/bedside commode with Modified [place].                         Time Tracking:     OT Date of Treatment: 07/24/24  OT Start Time: 1608  OT Stop Time: 1628  OT Total Time (min): 20 min    Billable Minutes:Self Care/Home Management 20    OT/MERA: OT          7/24/2024

## 2024-07-24 NOTE — PLAN OF CARE
07/24/24 1428   Post-Acute Status   Post-Acute Authorization Placement   Post-Acute Placement Status Referrals Sent     Per CCU resident Dr Baum, pt declined for transfer due to his end stage disease and BMI.  DINORA and Dr Baum met with pt at bedside with sister Paula on speakerphone and relayed the above, discussing that the next option would be attempting to get pt into an LTAC in [place] with the long-term goal of weaning pt's O2 to the point where he would be able to go home.  DINORA explained that there is a chance that [employer] will not authorize LTAC and instead opt to keep pt in the hospital.  DINORA also explained that there is a chance that insurance will not cover the transportation from [place] to [place] which could potentially leave the family with a bill of several thousand dollars for the transportation.  Paula stated that in order to get pt to [place] she is willing to take the risk of receiving such a bill.      Patient/family provided the following list of facilities in-network with patient's payor plan. Providers that are owned, operated, or affiliated with [facility] are included on the list:  [facility]    SW explained that since she is not familiar the facilities in [place], she would have to look into whether or not these facilities are the correct level of care prior to sending referrals.  Pt and Paula voiced understanding.      Preferred Facility: (if more than 1, listed in order of descending preference)  [facility]    After further review, referrals were successfully faxed to the following facilities:  [facility] (ph [phone])  [facility] (ph [phone])  [facility] (ph [phone])    If an additional preferred facility not listed above is identified, additional  ASSESSMENT/PLAN:  Below is the assessment and plan developed based on review of pertinent history, physical exam, labs, studies, and medications. 1. Knee pain, unspecified chronicity, unspecified laterality      No follow-ups on file. In discussion with the patient, we considered the numerus possible diagnoses that could be contributing to their present symptoms. We also deliberated on the extensive management options that must be considered to treat their current condition. We reviewed their accessible prior medical records, diagnostic tests, and current health and employment information. We considered how these symptoms were affecting the patient´s activities of daily living as well as employment and fitness activities. The patient had various questions regarding the possible risks, benefits, complications, morbidity and mortality regarding their diagnosis and treatment options. The patients´ comorbidities were considered, and I advocated that they consider maximizing lifestyle modification through nutrition and exercise to aid in addressing their symptoms. Shared decision making yielded an understanding to move forward with conservation treatment preferences. The patient expressed understanding that if conservative management fails to alleviate the present symptoms they will return to office for re-evaluation and consideration of additional diagnostic tests and potential surgical options. In the interim, we have recommended ice, elevation, and anti-inflammatory medications along with a physician directed home exercise program. We discussed the risks and common side effects of anti-inflammatory medications and instructed the patient to discontinue the medication and contact us if they experienced any side effects. The patient was encouraged to discuss the possible side effects with their family physician or pharmacist prior to initiating any new medications.     Given that the patient's symptoms are increasing in frequency and duration, we have decided to evaluate the etiology of the pain and loss of function with an MRI. We discussed the risks of an MRI which include, but are not limited to the enclosed space, noisy environment, magnetic effect on implanted metal. We also talked about the fact that MRI is also contraindicated in the presence of internal metallic objects such as bullets or shrapnel, as well as surgical clips, pins, plates, screws, metal sutures, or wire mesh. We talked about the fact that MRI does not use radiation, but it may be contrindicated if the patient has implanted pacemakers, intracranial aneurysm clips, cochlear implants, certain prosthetic devices, implanted drug infusion pumps, neurostimulators, bone-growth stimulators, certain intrauterine contraceptive devices; or any other type of iron-based metal implants. We discussed the fact that if you are pregnant or suspect that you may be pregnant, you should notify your physician and consult with your primary care or obstetrician before having an MRI. Although rare, we talked about the fact that if contrast dye is used, there is a risk for allergic reaction to the dye. Patients who are allergic to or sensitive to medications, contrast dye, iodine, or shellfish should notify the radiologist or technologist prior to the administration of dye. MRI contrast may also influence other conditions such as allergies, asthma, anemia, hypotension (low blood pressure), and sickle cell disease. The patient has expressed understanding of these risks and I will see the patient back after the MRI to discuss the findings as well as the treatment options. We will continue him on a home exercise program as well as ice and anti-inflammatory medication. I will see him back after his MRI discussed findings as well as the treatment options.   We talked about the fact that I was concerned for cartilage lesion in his patellofemoral referral to be sent. If above facilities unable to accept, will send additional referrals to in-network providers.       Elisa Olivo DINORA  [facility]  [id number]       joint.    SUBJECTIVE/OBJECTIVE:  Khalida Cummins (: 1976) is a 39 y.o. male, patient,here for evaluation of the No chief complaint on file. Alanis Schulte He seen today for his left knee as well as his right knee. He reports he is very active with volleyball and tennis. Reports he did have surgery with a partial medial meniscectomy on the left knee in . He reports his knees feel full. He denies any traumatic incident. He reports that he still quite active with tennis. He denies any popping or clicking. He reports rest does make it better but activity makes it worse. Reports he has difficulty going up and down stairs. Physical Exam    Upon physical examination, the patient is well developed, well nourished, alert and oriented times three, with normal mood and affect and walks with an antalgic gait. Upon examination of the left knee, the patient is nontender to palpation along the medial and lateral joint lines, and has no effusion. They are tender to palpation along the medial and lateral facets of the patella. They have crepitus of the patellofemoral joint with range of motion and discomfort with patella grind testing. The patient has no discomfort with Nadine´s maneuvers, and the knee is stable. They have full range of motion. They have 5/5 strength, and are neurovascularly intact distally. There is no erythema, warmth or skin lesions present. On examination of the contralateral extremity, the patient is nontender to palpation and has excellent range of motion, stability and strength. Imaging:    Views the knees demonstrate some early arthritis in the medial as well as patellofemoral compartment. Allergies   Allergen Reactions    Other Plant, Animal, Environmental Other (comments)     Dust, mold, cockroach       Current Outpatient Medications   Medication Sig    lisinopriL (PRINIVIL, ZESTRIL) 5 mg tablet Take 1 Tablet by mouth daily.     ergocalciferol, vitamin D2, (VITAMIN D2 PO) Take by mouth.  LOSARTAN PO Take  by mouth. (Patient not taking: Reported on 3/9/2022)    ondansetron (ZOFRAN ODT) 4 mg disintegrating tablet Take 1 Tab by mouth every eight (8) hours as needed for Nausea. (Patient not taking: Reported on 3/9/2022)    meclizine (ANTIVERT) 25 mg tablet Take 1 Tab by mouth three (3) times daily as needed for Dizziness. (Patient not taking: Reported on 3/9/2022)    amLODIPine (NORVASC) 5 mg tablet Take 5 mg by mouth daily.  ibuprofen (ADVIL) 200 mg tablet Take  by mouth. As needed.  SYMBICORT 80-4.5 mcg/actuation HFAA inhaler Take 1 Puff by inhalation daily. No current facility-administered medications for this visit. Past Medical History:   Diagnosis Date    Anxiety disorder     Asthma     exercise induced asthma    Constipation     Hypertension     Sarcoidosis     2014       Past Surgical History:   Procedure Laterality Date    HX CYST REMOVAL  finger        HX ORTHOPAEDIC  rt knee      rt arthroscopic    TN CHEST SURGERY PROCEDURE UNLISTED      NEEDLE BX       Family History   Problem Relation Age of Onset    Cancer Mother         breast cancer    Hypertension Mother     Diabetes Father     Hypertension Father     Cancer Father         prostate cancer    Diabetes Brother         type I    Thyroid Disease Brother     Anesth Problems Neg Hx        Social History     Socioeconomic History    Marital status:      Spouse name: Not on file    Number of children: Not on file    Years of education: Not on file    Highest education level: Not on file   Occupational History    Not on file   Tobacco Use    Smoking status: Former Smoker     Types: Cigarettes     Quit date: 2014     Years since quittin.6    Smokeless tobacco: Never Used   Substance and Sexual Activity    Alcohol use: Yes     Comment: RARELY    Drug use: No    Sexual activity: Yes   Other Topics Concern    Not on file   Social History Narrative    . Works as a teacher. Social Determinants of Health     Financial Resource Strain:     Difficulty of Paying Living Expenses: Not on file   Food Insecurity:     Worried About Running Out of Food in the Last Year: Not on file    Robin of Food in the Last Year: Not on file   Transportation Needs:     Lack of Transportation (Medical): Not on file    Lack of Transportation (Non-Medical): Not on file   Physical Activity:     Days of Exercise per Week: Not on file    Minutes of Exercise per Session: Not on file   Stress:     Feeling of Stress : Not on file   Social Connections:     Frequency of Communication with Friends and Family: Not on file    Frequency of Social Gatherings with Friends and Family: Not on file    Attends Jainism Services: Not on file    Active Member of [address] [place] [address] or Organizations: Not on file    Attends Club or Organization Meetings: Not on file    Marital Status: Not on file   Intimate Partner Violence:     Fear of Current or Ex-Partner: Not on file    Emotionally Abused: Not on file    Physically Abused: Not on file    Sexually Abused: Not on file   Housing Stability:     Unable to Pay for Housing in the Last Year: Not on file    Number of [place] in the Last Year: Not on file    Unstable Housing in the Last Year: Not on file       Review of Systems    No flowsheet data found. Vitals: There were no vitals taken for this visit. There is no height or weight on file to calculate BMI. An electronic signature was used to authenticate this note.   -- Steve Wisdom MD

## 2024-07-24 NOTE — RESPIRATORY THERAPY
RAPID RESPONSE RESPIRATORY THERAPY PROACTIVE ROUNDING NOTE             Time of visit: 1801     Code Status: Full Code   : 1975  Bed: 01327/47348 A:   MRN: [id number]  Time spent at the bedside: < 15 min    SITUATION    Evaluated patient for: HFNC Compliance     BACKGROUND    Patient has a past medical history of Arthritis, CHF (congestive heart failure), Cor pulmonale, Gallstones, GERD (gastroesophageal reflux disease), Hypertension, Morbid obesity, Obesity hypoventilation syndrome, On home oxygen therapy, MALLIKA (obstructive sleep apnea), Paroxysmal atrial fibrillation, PFO (patent foramen ovale), Pickwickian syndrome, and Pulmonary hypertension.    24 Hours Vitals Range:  Temp:  [97.6 °F (36.4 °C)-98.6 °F (37 °C)]   Pulse:  [81-94]   Resp:  [13-30]   BP: ()/(50-68)   SpO2:  [88 %-96 %]     Labs:    Recent Labs     24  0421 24  1452 24  0319 24  0955 24  0404 24  0753    136 137   < > 132* 137 139   K 3.0* 4.3 3.3*   < > 3.6 3.4* 3.7   CL 92* 95 92*   < > 91* 94* 95   CO2 35* 33* 36*   < > 32* 31* 38*   BUN 48* 46* 50*   < > 54* 54* 50*   CREATININE 1.9* 1.8* 2.1*   < > 2.2* 2.3* 2.1*   GLU 87 113* 92   < > 184* 140* 125*   PHOS 4.4  --  4.1  --   --  3.8  --    MG 1.7 2.4 2.1  --   --  1.8  --     < > = values in this interval not displayed.        Recent Labs     24  1949 24  0954 24  1002 24  0744   PH 7.320*  --   --  7.355 7.339*   PCO2 75.0*  --   --  68.2* 74.6*   PO2 27*   < > 35* 26* 35*   HCO3 38.6*  --   --  38.1* 40.1*   POCSATURATED 43   < > 57 43 61   BE 13*  --   --  13* 14*    < > = values in this interval not displayed.       ASSESSMENT/INTERVENTIONS    Pt is on a comfort flow 40L & 40%    Last VS   Temp: 98.5 °F (36.9 °C) (1548)  Pulse: 86 (1714)  Resp: 22 (1548)  BP: 93/50 (1548)  SpO2: 90 % (1714)    Level of Consciousness: Level of Consciousness (AVPU):  alert  Respiratory Effort: Respiratory Effort: Unlabored Expansion/Accessory Muscle Usage: Expansion/Accessory Muscles/Retractions: no retractions, no use of accessory muscles, expansion symmetric  All Lung Field Breath Sounds: All Lung Fields Breath Sounds: Anterior:, Lateral:, diminished  ISRAEL Breath Sounds: Anterior:, Posterior:, coarse  LLL Breath Sounds: Anterior:, Posterior:, coarse  RUL Breath Sounds: Anterior:, Posterior:, coarse  RML Breath Sounds: Anterior:, Posterior:, coarse  RLL Breath Sounds: Anterior:, Posterior:, coarse  O2 Device/Concentration: 40L 40%  Was the O2 device able to be weaned? No  Ambu at bedside:      Active Orders   Respiratory Care    Bipap Nighttime and Daytime Nap Use     Frequency: Nighttime and Daytime Nap Use     Number of Occurrences: Until Specified     Order Questions:      Mode BIPAP      FiO2% 40      Inspiratory pressure: 15      Expiratory pressure: 5    Oxygen Continuous     Frequency: Continuous     Number of Occurrences: Until Specified     Order Questions:      Device type: High flow      Device: Comfort Flow      FiO2%: 50      LPM: 40      Titrate O2 per Oxygen Titration Protocol: Yes      To maintain SpO2 goal of: >= 90%      Notify MD of: Inability to achieve desired SpO2; Sudden change in patient status and requires 20% increase in FiO2; Patient requires >60% FiO2    POCT SvO2 Q12H     Frequency: Q12H     Number of Occurrences: Until Specified    POCT Venous Blood Gas     Frequency: Q12H     Number of Occurrences: Until Specified     Order Comments: This test should be used for VBGs.  If using this order for other tests (K, creatinine, HCT, PT/INR, lactate etc)  ONLY do so in the case of an emergency or rapid response.Notify Physician if: see parameters below.         Order Questions:      Component: Blood Gas    Pulse Oximetry Q4H     Frequency: Q4H     Number of Occurrences: Until Specified       RECOMMENDATIONS    We recommend: RRT Recs: Continue POC per  primary team.      FOLLOW-UP    Please call back the Rapid Response RT, Sana Sales RRT at x [phone] for any questions or concerns.

## 2024-07-24 NOTE — PLAN OF CARE
Recommendations  1. Continue Low sodium diet-Fluid per MD   2. If pt does not discharge in <24 hours rec'd addition of Boost for optimization of protein/calorie intake   3. RD following     Goals: Meet % of EEN/EPN by RD f/u date  Nutrition Goal Status: progressing towards goal  Communication of RD Recs: POC

## 2024-07-24 NOTE — NURSING TRANSFER
Nursing Transfer Note      7/24/2024   2:15 PM    Nurse giving handoff: ADRIEL Ferrer    Nurse receiving handoff:ADRIEL Cash    Reason patient is being transferred: No longer requires ICU    Transfer To: [id number]    Transfer via bed    Transfer with 25L to O2, cardiac monitoring    Transported by RN, PCT, & RT      Telemetry: Rhythm NSR    Order for Tele Monitor? Yes    Additional Lines: Oxygen    4eyes on Skin: yes    Medicines sent: yes    Any special needs or follow-up needed: no    Patient belongings transferred with patient: Yes    Chart send with patient: Yes    Notified: pt notified family    Patient reassessed at: See flowsheet    Upon arrival to floor: cardiac monitor applied, patient oriented to room, call bell in reach, and bed in lowest position

## 2024-07-24 NOTE — ASSESSMENT & PLAN NOTE
On coumadin.    Lab Results   Component Value Date    INR 2.1 (H) 07/24/2024    INR 2.2 (H) 07/23/2024    INR 2.0 (H) 07/22/2024     -- Goal INR 2-3  -- Titrate warfarin daily    -- EP consulted for Aflutter, cardioverted 7/16

## 2024-07-24 NOTE — SUBJECTIVE & OBJECTIVE
Interval History: NAEON. Dobutamine restarted to assist in diuresis. VBG well compensated. On 45 L/ 40% HFNC.       Objective:     Vital Signs (Most Recent):  Temp: 97.6 °F (36.4 °C) (07/24/24 0705)  Pulse: 86 (07/24/24 0900)  Resp: (!) 24 (07/24/24 0900)  BP: 106/65 (07/24/24 0900)  SpO2: (!) 90 % (07/24/24 0900) Vital Signs (24h Range):  Temp:  [97.6 °F (36.4 °C)-98.4 °F (36.9 °C)] 97.6 °F (36.4 °C)  Pulse:  [81-97] 86  Resp:  [13-29] 24  SpO2:  [86 %-96 %] 90 %  BP: ()/(50-68) 106/65     Weight: 103.6 kg (228 lb 6.3 oz)  Body mass index is 39.2 kg/m².      Intake/Output Summary (Last 24 hours) at 7/24/2024 0915  Last data filed at 7/24/2024 0905  Gross per 24 hour   Intake 1427.62 ml   Output 3500 ml   Net -2072.38 ml        Physical Exam  Vitals and nursing note reviewed.   Constitutional:       General: He is not in acute distress.     Appearance: He is not toxic-appearing.      Interventions: Nasal cannula in place.   HENT:      Head: Normocephalic and atraumatic.      Nose: Congestion present.      Mouth/Throat:      Mouth: Mucous membranes are moist.   Eyes:      Pupils: Pupils are equal, round, and reactive to light.   Neck:      Comments: Central line in place, dressing clean and intact  Pulmonary:      Effort: No respiratory distress.      Breath sounds: Rales present. No wheezing.      Comments: Bibasilar crackles.  Musculoskeletal:      Right lower leg: Edema present.      Left lower leg: Edema present.   Skin:     General: Skin is warm and dry.   Neurological:      Mental Status: He is alert and oriented to person, place, and time.   Psychiatric:         Mood and Affect: Mood normal.         Behavior: Behavior normal. Behavior is cooperative.           Review of Systems    Vents:  Oxygen Concentration (%): 60 (07/24/24 0517)    Lines/Drains/Airways       Peripherally Inserted Central Catheter Line  Duration             PICC Double Lumen 07/22/24 1120 right basilic 1 day              Peripheral  Intravenous Line  Duration                  Peripheral IV - Single Lumen 07/20/24 2000 18 G Anterior;Proximal;Right Forearm 3 days                    Significant Labs:    CBC/Anemia Profile:  Recent Labs   Lab 07/23/24 0319 07/24/24  0404   WBC 5.94 5.80   HGB 12.9* 12.2*   HCT 44.8 42.5    191   MCV 87 86   RDW 22.0* 22.3*        Chemistries:  Recent Labs   Lab 07/22/24  1452 07/23/24 0319 07/23/24  0955 07/23/24  2031 07/24/24  0404 07/24/24  0753    137   < > 132* 137 139   K 4.3 3.3*   < > 3.6 3.4* 3.7   CL 95 92*   < > 91* 94* 95   CO2 33* 36*   < > 32* 31* 38*   BUN 46* 50*   < > 54* 54* 50*   CREATININE 1.8* 2.1*   < > 2.2* 2.3* 2.1*   CALCIUM 9.4 9.5   < > 8.9 9.1 9.1   ALBUMIN  --  2.8*  --   --  2.5*  --    PROT  --  7.9  --   --  7.3  --    BILITOT  --  0.8  --   --  0.8  --    ALKPHOS  --  133  --   --  127  --    ALT  --  10  --   --  10  --    AST  --  18  --   --  16  --    MG 2.4 2.1  --   --  1.8  --    PHOS  --  4.1  --   --  3.8  --     < > = values in this interval not displayed.       All pertinent labs within the past 24 hours have been reviewed.    Significant Imaging:  I have reviewed all pertinent imaging results/findings within the past 24 hours.

## 2024-07-24 NOTE — ASSESSMENT & PLAN NOTE
Patient with Hypercapnic and Hypoxic Respiratory failure which is Acute on chronic.  he is on home oxygen at 3 LPM. Supplemental oxygen was provided and noted- Oxygen Concentration (%):  [40-60] 40    Signs/symptoms of respiratory failure include- tachypnea and increased work of breathing. Contributing diagnoses includes - CHF Labs and images were reviewed. Patient Has recent ABG, which has been reviewed.    -- Trend VBG BID  -- BiPap during night and naps; okay for HFNC throughout the day per pulm  -- Goal SpO2 > 88%

## 2024-07-24 NOTE — PLAN OF CARE
07/24/24 1516   Post-Acute Status   Post-Acute Authorization Placement   Post-Acute Placement Status Pending payor review/awaiting authorization (if required)     DINORA received call from Qi at [facility] ([phone]) reporting that pt is medically accepted and they will submit for auth and contact family.  SW provided phone number for pt's sister Paula.  Pt stepped down to floor and hand-off given to [facility] CM staff.      UPDATE 3:24 PM  SW received call from Soniya at [facility] reporting that pt has been accepted by them as well.  DINORA explained that another facility is submitting for auth per family choice but that their acceptance would be noted as a back-up option.      Elisa Olivo LMSW  [facility]  [phone]     DISPLAY PLAN FREE TEXT DISPLAY PLAN FREE TEXT DISPLAY PLAN FREE TEXT DISPLAY PLAN FREE TEXT DISPLAY PLAN FREE TEXT DISPLAY PLAN FREE TEXT

## 2024-07-24 NOTE — ASSESSMENT & PLAN NOTE
- patient not amenable to palliative options   - spoke to sister 7/22: stated that we will continue to try to wean HFNC and lasix to an acceptable amount to discharge on to [place], and if time limited trial is unsuccessful then will need to discharge the patient on hospice to [place]. The patient's sister states she will talk to the patient about considering hospice as an option. However this patient is not amenable to hospice. Plan is to wean transfer patient to a hospital in [place] and if that fails then transfer to an LTAC in [place], the patient's family is aware they would need to pay for transport.   - Transfer request placed by Dr. Leary to Dr. Wheatley at [facility] for second opinion of heart and lung transplant. The pt was denied transfer by the facility given end stage disease and BMI. The patient is now discussing options for LTAC in [place].

## 2024-07-24 NOTE — ASSESSMENT & PLAN NOTE
Bicarb 40's in setting of aggressive diuresis. Suspect contraction alkalosis.    - Continue diamox prn  - Trend Bicarb

## 2024-07-24 NOTE — PLAN OF CARE
CICU DAILY GOALS       A: Awake    RASS: Goal - RASS Goal: 0-->alert and calm  Actual - RASS (Carter Agitation-Sedation Scale): alert and calm   Restraint necessity:    B: Breath   SBT: Not intubated   C: Coordinate A & B, analgesics/sedatives   Pain: managed    SAT: Not intubated  D: Delirium   CAM-ICU: Overall CAM-ICU: Negative  E: Early(intubated/ Progressive (non-intubated) Mobility   MOVE Screen: Pass   Activity: Activity Management: Up in chair - L3  FAS: Feeding/Nutrition   Diet order: Diet/Nutrition Received: low saturated fat/low cholesterol, 2 gram sodium,   Fluid restriction: Fluid Requirement: 1500   Nutritional Supplement Intake: Quantity 0, Type: Boost  T: Thrombus   DVT prophylaxis: VTE Required Core Measure: Pharmacological prophylaxis initiated/maintained  H: HOB Elevation   Head of Bed (HOB) Positioning: HOB elevated  U: Ulcer Prophylaxis   GI: yes  G: Glucose control   managed      S: Skin   Nurses Note -- 4 Eyes  Skin assessed during: Q Shift Change   CHOOSE ONE:  [] No Altered Skin Integrity Present      []Prevention Measures Documented    [x] Yes- Altered Skin Integrity Present or Discovered     [x] LDA Added if Not in Epic (Describe Wound)     [x] New Altered Skin Integrity was Present on Admit and Documented in LDA     [] Wound Image Taken  Wound Care Consulted? No  Attending Nurse:  Ilana Natarajan RN/Staff Member:  Donna    B: Bowel Function   no issues   I: Indwelling Catheters   [place]ne necessity:     CVC necessity: Yes   IPAD offered: No  D: De-escalation Antibx   No  Plan for the day   diuresis  Family/Goals of care/Code Status   Code Status: Full Code     No acute events throughout day, VS and assessment per flow sheet, patient progressing towards goals as tolerated, plan of care reviewed with Yong Mcintyre and family, all concerns addressed, will continue to monitor.

## 2024-07-24 NOTE — PLAN OF CARE
07/24/24 1200   Discharge Plan   Discharge Plan A Hospital Transfer   Discharge Plan B Long-term acute care facility (LTAC)     Per discussion with CCU team and Dr Pretty with Palliative Care, plan is currently to attempt to transfer pt to [facility] in [place] for second opinion per family request.  If denied, plan will then be to attempt to transfer pt to an LTAC in [place].  Per Dr Pretty pt and family are very aware that transfer to [place] is unlikely to be covered by insurance.      Discharge Plan A and Plan B have been determined by review of patient's clinical status, future medical and therapeutic needs, and coverage/benefits for post-acute care in coordination with multidisciplinary team members.  Will continue to follow.      Elisa Olivo, DOMINGO  [facility]  [phone]

## 2024-07-24 NOTE — SUBJECTIVE & OBJECTIVE
Interval History: NAEON. On  and lasix.     ROS  Objective:     Vital Signs (Most Recent):  Temp: 98.5 °F (36.9 °C) (07/24/24 1548)  Pulse: 86 (07/24/24 1548)  Resp: (!) 22 (07/24/24 1548)  BP: (!) 93/50 (07/24/24 1548)  SpO2: 95 % (07/24/24 1548) Vital Signs (24h Range):  Temp:  [97.6 °F (36.4 °C)-98.6 °F (37 °C)] 98.5 °F (36.9 °C)  Pulse:  [81-97] 86  Resp:  [13-30] 22  SpO2:  [88 %-96 %] 95 %  BP: ()/(50-68) 93/50     Weight: 103.6 kg (228 lb 6.3 oz)  Body mass index is 39.2 kg/m².     SpO2: 95 %         Intake/Output Summary (Last 24 hours) at 7/24/2024 1645  Last data filed at 7/24/2024 1505  Gross per 24 hour   Intake 1370.91 ml   Output 2850 ml   Net -1479.09 ml       Lines/Drains/Airways       Peripherally Inserted Central Catheter Line  Duration             PICC Double Lumen 07/22/24 1120 right basilic 2 days              Peripheral Intravenous Line  Duration                  Peripheral IV - Single Lumen 07/20/24 2000 18 G Anterior;Proximal;Right Forearm 3 days                       Physical Exam  Vitals reviewed.   Constitutional:       General: He is not in acute distress.     Appearance: He is obese. He is ill-appearing.   Cardiovascular:      Rate and Rhythm: Normal rate and regular rhythm.   Pulmonary:      Effort: No respiratory distress.      Breath sounds: No rhonchi.      Comments: On HFNC   Abdominal:      General: Abdomen is flat.   Musculoskeletal:      Right lower leg: No edema.      Left lower leg: No edema.   Skin:     General: Skin is warm.   Neurological:      Mental Status: He is alert.            Significant Labs: All pertinent lab results from the last 24 hours have been reviewed.    Significant Imaging: Echocardiogram: 2D echo with color flow doppler: No results found. However, due to the size of the patient record, not all encounters were searched. Please check Results Review for a complete set of results.

## 2024-07-24 NOTE — PROGRESS NOTES
"[facility] - Cardiac Intensive Care  Adult Nutrition  Progress Note    SUMMARY       Recommendations  1. Continue Low sodium-Fluid per MD   2. If pt does not discharge in <24 hours rec'd addition of Boost for optimization of protein/calorie intake   3. RD following    Goals: Meet % of EEN/EPN by RD f/u date  Nutrition Goal Status: progressing towards goal  Communication of RD Recs: POC    Assessment and Plan    Nutrition Problem  Increased protein needs    Related to (etiology):   Physiological demands     Signs and Symptoms (as evidenced by):   Right ventricular dysfunction     Interventions (treatment strategy):  Collaboration of nutrition care w/ other providers     Nutrition Diagnosis Status:   New     Reason for Assessment    Reason For Assessment: RD follow-up  Diagnosis: other (see comments)  Relevant Medical History: HTN, arthritis, CHF  Interdisciplinary Rounds: attended  General Information Comments: Spoke w/ pt at bedside, reports an overall good appetite however not preferring the food that is offered in the hospital. Emphasized protein intake while consuming meals and aiming for <50% meal consumption. Discussed a heart healthy diet briefly with pt- pt reports that he knows what to eat at home. Pt denies any issues w/ chewing/swallowing. No n/v. Discussed incorporating Boost w/ meals if pt does not consume enough po intake. Per chart review noted, 11% significant weight loss x 7 months- likely 2/2 fluid shifts. RD following.  Nutrition Discharge Planning: heart healthy diet education    Nutrition Risk Screen    Nutrition Risk Screen: no indicators present    Nutrition/Diet History    Spiritual, Cultural Beliefs, Gnosticist Practices, Values that Affect Care: no    Anthropometrics    Temp: 97.7 °F (36.5 °C)  Height Method: Stated  Height: 5' 4" (162.6 cm)  Height (inches): 64 in  Weight Method: Bed Scale  Weight: 103.6 kg (228 lb 6.3 oz)  Weight (lb): 228.4 lb  Ideal Body Weight (IBW), Male: 130 lb  % " Ideal Body Weight, Male (lb): 175.69 %  BMI (Calculated): 39.2  BMI Grade: 35 - 39.9 - obesity - grade II       Lab/Procedures/Meds    Pertinent Labs Reviewed: reviewed  Pertinent Labs Comments: Glucose 125  Pertinent Medications Reviewed: reviewed  Pertinent Medications Comments: coumadin    Estimated/Assessed Needs    Weight Used For Calorie Calculations: 103.6 kg (228 lb 6.3 oz)  Energy Calorie Requirements (kcal): 1817  Energy Need Method: Post-St Jeor (PAL 1.0)  Protein Requirements: 103 g (1.0 g/kg)  Weight Used For Protein Calculations: 103.6 kg (228 lb 6.3 oz)        RDA Method (mL): 1817         Nutrition Prescription Ordered    Current Diet Order: Low sodium  Nutrition Order Comments: 1500 mL    Evaluation of Received Nutrient/Fluid Intake    I/O: -  Energy Calories Required: meeting needs  Protein Required: meeting needs  Fluid Required: meeting needs  Total Fluid Intake (mL/kg): 1 ml or fluid per MD  Tolerance: tolerating  % Intake of Estimated Energy Needs: 50 - 75 %  % Meal Intake: 50 - 75 %    Nutrition Risk    Level of Risk/Frequency of Follow-up: low ((1x/week))     Monitor and Evaluation    Food and Nutrient Intake: energy intake, food and beverage intake  Food and Nutrient Adminstration: diet order  Knowledge/Beliefs/Attitudes: food and nutrition knowledge/skill, beliefs and attitudes  Physical Activity and Function: nutrition-related ADLs and IADLs, factors affecting access to physical activity  Anthropometric Measurements: height/length, weight, weight change, body mass index, growth pattern indices/percentile ranks  Biochemical Data, Medical Tests and Procedures: electrolyte and renal panel, gastrointestinal profile, glucose/endocrine profile, lipid profile, inflammatory profile  Nutrition-Focused Physical Findings: overall appearance, extremities, muscles and bones, head and eyes, skin     Nutrition Follow-Up    RD Follow-up?: Yes

## 2024-07-24 NOTE — ASSESSMENT & PLAN NOTE
WHO group 2-3 per his managing pulmonologist (Danyell at [facility])  Adherent with diet, CPAP, and on continuous O2 3L at home.     -- RV dysfunction treatment as above  -- Pulm consulted, recs appreciated   -- Concern for RA per pulm, rheum consulted. No options for treatment given patient is end stage.

## 2024-07-24 NOTE — NURSING TRANSFER
Nursing Transfer Note      7/24/2024   2:25 PM    Nurse giving handoff: ADRIEL Ferrer C[facility]  Nurse receiving handoff:Shreya MOREL    Reason patient is being transferred: Stepdown     Transfer From: [id number]    Transfer via bed    Transfer with  O2, cardiac monitoring, Continuous IV drips    Transported by Carissa MOREL, PCT, RT    Transfer Vital Signs:  See Flow Sheet    Telemetry: Box Number [id number]  applied upon arrival     Order for Tele Monitor? Yes    Additional Lines: Oxygen    4eyes on Skin: yes    Medicines sent: Yes transferred with IV med/pole    Any special needs or follow-up needed: Team notified patient arrival     Patient belongings transferred with patient: Yes    Chart send with patient: Yes    Notified: Family by sending unit RN    Patient reassessed at: See flowsheet    Upon arrival to floor: cardiac monitor applied, patient oriented to room, call bell in reach, and bed in lowest position

## 2024-07-24 NOTE — PROGRESS NOTES
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    Pertinent Recent/Prior History:  - Had RHC in 1/2024 with notably elevated PA pressures, severe pulm HTN with normal to mildly elevated left sided filling pressure, moderately elevated right sided filling pressure, mod to severe TR  - Follows outpt with pulm Dr. Child at [facility] for many years - last visit 6/12/24  - Follows outpt with heart transplant Dr. Martínez - last visit 6//24  - Follows outpt with nephro Dr. Sotelo - last visit 6/19/24  - Follows outpt with PCP Dr. Escalona for many years   - Has had chronic pulm HTN and cardiopulmonary complications since at least 2008 - Legacy Documents reviewed as well  - Prior MRI in 9/2012 did show some abnormal edema around the carpal bones with small amount of fluid within radiocarpal and intercarpal joints - very non specific findings but could possible be seen with inflammatory arthritis too    Current Hospitalization:  - Initially presented with worsening dyspnea and BLE edema x 2 weeks  - Has had multiple similar admissions over past few months for decompensated HF  - Pulmonology initiated CTD workup during this hospitalization, which has resulted in high positive RF and a +COURTNEY so far (other labs pending)  - They feel he has some parenchymal abnormalities on CT (could be cysts) and some mosaicism  "(which could be air trapping or related to known PH) - these are not typical of RA-ILD but there is still concern for CTD related ILD per pulm     Rheumatology is consulted for "concern for pulmonary HTN 2/2 to RA."    On evaluation, pt endorses the above long standing history of pulmonary disease. He has never previously been dx with an inflammatory arthritis as far as he can recall. Pt denies any joint pains, stiffness, or swelling symptoms. No h/o skin rashes (apart from venous stasis dermatitis changes), Raynaud's, patchy alopecia, uveitis/painful red problems, oral/nasal ulcers, GI changes, urinary changes. Has had respiratory and exertional symptoms along with lower extremity edema in setting of known cardiopulmonary disease.    Family hx: possible OA in grandfather, no other known autoimmune rheumatologic disease hx  Social hx: non smoker, rare alcohol, no other drug use    Interval History:  Pt on high-flow NC. States he feels the feels better today, improved breathing      Past Medical History:   Diagnosis Date    Arthritis     CHF (congestive heart failure)     Diastolic dysfunction    Cor pulmonale 11/05/2012    Gallstones     GERD (gastroesophageal reflux disease)     Hypertension     Morbid obesity 11/05/2012    Obesity hypoventilation syndrome     On home oxygen therapy 03/07/2014    MALLIKA (obstructive sleep apnea)     BPAP 16/11 with 3 L at night (continuous O2).    Paroxysmal atrial fibrillation     PFO (patent foramen ovale) 11/05/2012    status post closure    Pickwickian syndrome 11/05/2012    Pulmonary hypertension        Past Surgical History:   Procedure Laterality Date    CHOLECYSTECTOMY      RIGHT HEART CATHETERIZATION Right 03/22/2022    Procedure: INSERTION, CATHETER, RIGHT HEART;  Surgeon: Tony Martínez MD;  Location: [facility] CATH LAB;  Service: Cardiology;  Laterality: Right;    RIGHT HEART CATHETERIZATION Right 01/31/2024    Procedure: INSERTION, CATHETER, RIGHT HEART;  Surgeon: Sheri Ritter " "MD VIRGILIO;  Location: [facility] CATH LAB;  Service: Cardiology;  Laterality: Right;    UPPER GASTROINTESTINAL ENDOSCOPY      2-3 years ago       Immunization History   Administered Date(s) Administered    COVID-19, MRNA, LN-S, PF (MODERNA FULL 0.5 ML DOSE) 09/30/2022    COVID-19, MRNA, LN-S, PF (Pfizer) (Purple Cap) 03/09/2021, 03/30/2021, 10/18/2021    COVID-19, mRNA, LNP-S, bivalent booster, PF (PFIZER OMICRON) 09/30/2022    Influenza 11/07/2018, 04/09/2019, 09/18/2020    Influenza (Flumist) - Quadrivalent - Intranasal *Preferred* (2-49 years old) 10/16/2014    Influenza - Quadrivalent 10/16/2014    Influenza - Quadrivalent - MDCK - PF 10/31/2018    Influenza - Quadrivalent - PF *Preferred* (6 months and older) 10/14/2015, 10/27/2016, 10/23/2017, 10/24/2019, 09/16/2020, 11/16/2021, 09/30/2022, 12/15/2023    Influenza - Trivalent (ADULT) 11/06/2014    Influenza - Trivalent - PF (ADULT) 11/06/2014, 10/14/2015, 10/27/2016, 10/23/2017, 10/24/2019, 09/16/2020, 11/16/2021    Pneumococcal Polysaccharide - 23 Valent 10/23/2017    Varicella 11/06/2014       Review of patient's allergies indicates:   Allergen Reactions    Lipitor [atorvastatin] Other (See Comments)     "it makes my legs feel like jelly"  Other reaction(s): causes legs to hurt     Current Facility-Administered Medications   Medication Frequency    0.9%  NaCl infusion PRN    0.9%  NaCl infusion PRN    acetaminophen tablet 650 mg Q6H PRN    acetaZOLAMIDE tablet 500 mg BID    albuterol inhaler 2 puff Q4H PRN    albuterol inhaler 2 puff Q6H    allopurinoL tablet 300 mg Daily    aluminum & magnesium hydroxide-simethicone 400-400-40 mg/5 mL suspension 30 mL Q6H PRN    amiodarone tablet 400 mg BID    Followed by    [START ON 7/27/2024] amiodarone tablet 200 mg Daily    DOBUtamine 1000 mg in D5W 250 mL infusion Continuous    fluticasone-salmeterol 250-50 mcg/dose diskus inhaler 1 puff BID    furosemide (Lasix) 500 mg in 50 mL infusion (conc: 10 mg/mL) Continuous    glycerin " adult suppository 1 suppository Daily PRN    melatonin tablet 6 mg Nightly PRN    mupirocin 2 % ointment BID    pantoprazole EC tablet 40 mg Daily    quetiapine split tablet 50 mg QHS    senna-docusate 8.6-50 mg per tablet 1 tablet Daily    sodium chloride 0.9% flush 10 mL PRN    warfarin (COUMADIN) tablet 5 mg Daily     Family History       Problem Relation (Age of Onset)    Arthritis Mother, Maternal Grandmother, Maternal Grandfather    Asthma Mother, Sister, Maternal Grandmother    Breast cancer Paternal Grandmother    Diabetes Maternal Grandmother    Down syndrome Brother    Gout Brother    Hypertension Father, Maternal Grandmother, Maternal Grandfather, Paternal Grandfather          Tobacco Use    Smoking status: Never    Smokeless tobacco: Never   Substance and Sexual Activity    Alcohol use: Yes     Comment: holidays  rare    Drug use: No    Sexual activity: Not Currently     Partners: Female     Objective:     Vital Signs (Most Recent):  Temp: 97.4 °F (36.3 °C) (07/17/24 1100)  Pulse: 102 (07/17/24 1355)  Resp: 20 (07/17/24 1355)  BP: (!) 101/59 (07/17/24 1100)  SpO2: (!) 93 % (07/17/24 1355) Vital Signs (24h Range):  Temp:  [96.9 °F (36.1 °C)-98 °F (36.7 °C)] 97.4 °F (36.3 °C)  Pulse:  [] 102  Resp:  [12-33] 20  SpO2:  [61 %-96 %] 93 %  BP: ()/(58-77) 101/59     Weight: 103 kg (227 lb 1.2 oz) (07/17/24 0318)  Body mass index is 37.79 kg/m².  Body surface area is 2.17 meters squared.      Intake/Output Summary (Last 24 hours) at 7/17/2024 1455  Last data filed at 7/17/2024 1200  Gross per 24 hour   Intake 1794.02 ml   Output 2775 ml   Net -980.98 ml         Physical Exam   Constitutional: He is oriented to person, place, and time. He appears well-developed, well-nourished and obese. No distress. He appears ill.   HENT:   Head: Normocephalic and atraumatic.   Right Ear: External ear normal.   Left Ear: External ear normal.   Nose: Nose normal. No nasal congestion.   Mouth/Throat: Mucous membranes  are moist. Oropharynx is clear.   Eyes: Conjunctivae are normal.   Cardiovascular: Normal rate and regular rhythm.   Pulmonary/Chest:   Pulmonary Comments: On high leo O2 on f/u eval. Diminished breath sounds bilaterally, some basilar crackles present.  Abdominal: Soft. He exhibits no distension. There is no abdominal tenderness.   Musculoskeletal:         General: No tenderness. Normal range of motion.      Cervical back: Normal range of motion and neck supple.      Right lower leg: Edema present.      Left lower leg: Edema present.      Comments: No acute synovitis in any of the small or large joints   Neurological: He is alert and oriented to person, place, and time.   Skin: Skin is warm and dry.   BLE with stasis dermatitis changes   Psychiatric: His behavior is normal. Mood normal.   Vitals reviewed.       Significant Labs:  All pertinent lab results from the last 24 hours have been reviewed.    Significant Imaging:  Imaging results within the past 24 hours have been reviewed.  Assessment/Plan:     Cardiac/Vascular  Pulmonary hypertension  - Pt with long standing severe end stage Group 2-3 pulmonary hypertension, since around 2008  - Has followed with pulm Dr. Ogden for many years  - Rheumatology consulted for concern of possible RA and associated pulm HTN  - Based on initial history and physical exam - pt without clinically evident RA joint disease. Bilateral hand x-ray unremarkable, no inflammation or erosions  - Typically, for RA to indirectly cause pulm HTN, would be in setting of ILD or RA-vasculitis (RA doesn't alone/directly cause PAH)  - Scleroderma could possibly cause PAH, but pt with no sign of SSc based on history and physical exam  - SSA and SSB negative w/ no sicca symptoms to support Sjogren's as possible cause of ILD  - RF noted to be positive on this admission, CCP negative, COURTNEY 1:640, sed rate >120, CRP 21. COURTNEY profile negative. Complements wnl. Hepatitis B and C negative. PR3  negative.  - RF as a general antibody to IgG- could be positive in setting of prior infections/disease or a paraproteinemia      Plan/Recommendations:  - At this point, pt is with end stage disease  - There is no clinically evident autoimmune rheumatologic process, but will complete lab workup to further evaluate. Pending: Myomarker panel, Th/To, scleroderma abs  - No treatment recommendations at this time  - APLs labs: Beta-2 IgG elevated - 25. This will need to be repeated in 12 weeks  - Follow up with our clinic outpatient           Laura Quigley MD  Rheumatology  [facility] - Trans[facility]

## 2024-07-24 NOTE — PROGRESS NOTES
[facility] - Cardiac Intensive Care  Pulmonology  Progress Note    Patient Name: Yong Mcintyre  MRN: [id number]  Admission Date: 7/4/2024  Hospital Length of Stay: 20 days  Code Status: Full Code  Attending Provider: Rossy Leary MD  Primary Care Provider: Gianni Escalona MD   Principal Problem: Right ventricular dysfunction    Subjective:     Interval History: NAEON. Dobutamine restarted to assist in diuresis. VBG well compensated. On 45 L/ 40% HFNC.       Objective:     Vital Signs (Most Recent):  Temp: 97.6 °F (36.4 °C) (07/24/24 0705)  Pulse: 86 (07/24/24 0900)  Resp: (!) 24 (07/24/24 0900)  BP: 106/65 (07/24/24 0900)  SpO2: (!) 90 % (07/24/24 0900) Vital Signs (24h Range):  Temp:  [97.6 °F (36.4 °C)-98.4 °F (36.9 °C)] 97.6 °F (36.4 °C)  Pulse:  [81-97] 86  Resp:  [13-29] 24  SpO2:  [86 %-96 %] 90 %  BP: ()/(50-68) 106/65     Weight: 103.6 kg (228 lb 6.3 oz)  Body mass index is 39.2 kg/m².      Intake/Output Summary (Last 24 hours) at 7/24/2024 0915  Last data filed at 7/24/2024 0905  Gross per 24 hour   Intake 1427.62 ml   Output 3500 ml   Net -2072.38 ml        Physical Exam  Vitals and nursing note reviewed.   Constitutional:       General: He is not in acute distress.     Appearance: He is not toxic-appearing.      Interventions: Nasal cannula in place.   HENT:      Head: Normocephalic and atraumatic.      Nose: Congestion present.      Mouth/Throat:      Mouth: Mucous membranes are moist.   Eyes:      Pupils: Pupils are equal, round, and reactive to light.   Neck:      Comments: Central line in place, dressing clean and intact  Pulmonary:      Effort: No respiratory distress.      Breath sounds: Rales present. No wheezing.      Comments: Bibasilar crackles.  Musculoskeletal:      Right lower leg: Edema present.      Left lower leg: Edema present.   Skin:     General: Skin is warm and dry.   Neurological:      Mental Status: He is alert and oriented to person, place, and time.   Psychiatric:          Mood and Affect: Mood normal.         Behavior: Behavior normal. Behavior is cooperative.           Review of Systems    Vents:  Oxygen Concentration (%): 60 (07/24/24 0517)    Lines/Drains/Airways       Peripherally Inserted Central Catheter Line  Duration             PICC Double Lumen 07/22/24 1120 right basilic 1 day              Peripheral Intravenous Line  Duration                  Peripheral IV - Single Lumen 07/20/24 2000 18 G Anterior;Proximal;Right Forearm 3 days                    Significant Labs:    CBC/Anemia Profile:  Recent Labs   Lab 07/23/24  0319 07/24/24  0404   WBC 5.94 5.80   HGB 12.9* 12.2*   HCT 44.8 42.5    191   MCV 87 86   RDW 22.0* 22.3*        Chemistries:  Recent Labs   Lab 07/22/24  1452 07/23/24  0319 07/23/24  0955 07/23/24  2031 07/24/24  0404 07/24/24  0753    137   < > 132* 137 139   K 4.3 3.3*   < > 3.6 3.4* 3.7   CL 95 92*   < > 91* 94* 95   CO2 33* 36*   < > 32* 31* 38*   BUN 46* 50*   < > 54* 54* 50*   CREATININE 1.8* 2.1*   < > 2.2* 2.3* 2.1*   CALCIUM 9.4 9.5   < > 8.9 9.1 9.1   ALBUMIN  --  2.8*  --   --  2.5*  --    PROT  --  7.9  --   --  7.3  --    BILITOT  --  0.8  --   --  0.8  --    ALKPHOS  --  133  --   --  127  --    ALT  --  10  --   --  10  --    AST  --  18  --   --  16  --    MG 2.4 2.1  --   --  1.8  --    PHOS  --  4.1  --   --  3.8  --     < > = values in this interval not displayed.       All pertinent labs within the past 24 hours have been reviewed.    Significant Imaging:  I have reviewed all pertinent imaging results/findings within the past 24 hours.  Assessment/Plan:     Pulmonary  Acute on chronic respiratory failure with hypoxia  Patient with a history of chronic CO2 retention, has labs with elevated bicarb for years (baseline appears upper 30's) on home NIV. Patient is adherent with home O2 and BiPAP. Per notes from [facility], there was some concern for inadequate ventilatory support at home due to worsening bicarb and Hgb, however  patient has had multiple hospitalizations in the past calendar year alone for right heart failure. Most recent Echo confirming very high pulmonary artery systolic pressure, and some RV dysfunction but not severely dilating to the point of impeding left heart function. Patient's hypercapnia is multifactorial. Baseline ventilatory defect is present (severe obstruction on prior PFTs in the [facility] system) as well as sheer ventilatory restriction from significantly enlarged heart. Patient's baseline CO2 is hard to determine, but has been in the 70's with normal PH's on arterial blood gases. Patient has been appropriately aggressively diuresed during this hospitalization and may have developed a contraction alkalosis. It's likely some aspect of this increase in CO2 over the past few days is a compensatory response to his alkalosis. Has started diamox for assisted diuresis. Overall, patient has multiple severely morbid and high mortality conditions.     - ABG on 7/17 with normal pH of 7.37 and pCO2 of 70. Patient's pCO2 is at its baseline, patient with normal pH and pCO2 in the upper 70's and lower 80's on venous blood gas. This is a sign of well compensated chronic hypercapnic respiratory failure. Patient does not need continuous BiPAP, will only need it while he is sleeping. My bigger concern is patient's oxygenation, rather than his ventilation. He is requiring a high amount of support to maintain appropriate saturations. The patient's goal O2 saturation should be 88-92% and we should utilize whatever oxygen delivery system is needed to achieve those goals, including Comfort flow / high flow nasal cannula. Hypoxemia will worsen patient's pulmonary hypertension.    - CTD workup ordered. RF has returned very high. He does appear to have parenchymal abnormalities on CT persistently with what appear to be cysts. 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 There is significant mosaicism on  his CT previously which could be consistent with air trapping (prior PFTs with significantly elevated RV-TLC ratio consistent with air trapping), however PVOD / PH can have similar changes.    Recommendations  - Would continue diuresis   - BiPAP at night and with naps, otherwise OK for high-flow nasal cannula throughout the day. Would continue to wean HFNC  - Trial of HFNC with bubble humidifier   - Goal sat is 88-92%   - Continue bronchodilators   - Nebulizers prn  - Will follow up rheumatologic workup    Cardiac/Vascular  Pulmonary hypertension  Patient with severe PH, last RHC in 1/2024 with Wedge of 15 (24 with exercise) and PVR of 10. Was previously on Bosentan in the past but has been off for some time due to lack of indication. Seems WHO group 2-3 moreso per Dr. Child's last note.    Regardless, minimal data for efficacy on PH specific meds in group 2 and 3 disease. Could lead to worsening VQ mismatch / left heart overload and worsen his symptoms.  - Evaluating for CTD-ILD which may be contributing to patient's PH                 Mawadah Samad, MD  Pulmonology  [facility] - Cardiac Int[facility]

## 2024-07-25 LAB
ALBUMIN SERPL BCP-MCNC: 2.7 G/DL (ref 3.5–5.2)
ALBUMIN SERPL BCP-MCNC: 2.9 G/DL (ref 3.5–5.2)
ALP SERPL-CCNC: 134 U/L (ref 55–135)
ALT SERPL W/O P-5'-P-CCNC: 10 U/L (ref 10–44)
ANCA AB TITR SER IF: NORMAL TITER
ANION GAP SERPL CALC-SCNC: 7 MMOL/L (ref 8–16)
ANION GAP SERPL CALC-SCNC: 8 MMOL/L (ref 8–16)
AST SERPL-CCNC: 17 U/L (ref 10–40)
BASOPHILS # BLD AUTO: 0.04 K/UL (ref 0–0.2)
BASOPHILS NFR BLD: 0.7 % (ref 0–1.9)
BILIRUB SERPL-MCNC: 1 MG/DL (ref 0.1–1)
BUN SERPL-MCNC: 42 MG/DL (ref 6–20)
BUN SERPL-MCNC: 47 MG/DL (ref 6–20)
CALCIUM SERPL-MCNC: 9.1 MG/DL (ref 8.7–10.5)
CALCIUM SERPL-MCNC: 9.3 MG/DL (ref 8.7–10.5)
CHLORIDE SERPL-SCNC: 96 MMOL/L (ref 95–110)
CHLORIDE SERPL-SCNC: 96 MMOL/L (ref 95–110)
CO2 SERPL-SCNC: 35 MMOL/L (ref 23–29)
CO2 SERPL-SCNC: 37 MMOL/L (ref 23–29)
CREAT SERPL-MCNC: 2 MG/DL (ref 0.5–1.4)
CREAT SERPL-MCNC: 2 MG/DL (ref 0.5–1.4)
DIFFERENTIAL METHOD BLD: ABNORMAL
EOSINOPHIL # BLD AUTO: 0.2 K/UL (ref 0–0.5)
EOSINOPHIL NFR BLD: 3.2 % (ref 0–8)
ERYTHROCYTE [DISTWIDTH] IN BLOOD BY AUTOMATED COUNT: 22.4 % (ref 11.5–14.5)
EST. GFR  (NO RACE VARIABLE): 40.4 ML/MIN/1.73 M^2
EST. GFR  (NO RACE VARIABLE): 40.4 ML/MIN/1.73 M^2
GLUCOSE SERPL-MCNC: 136 MG/DL (ref 70–110)
GLUCOSE SERPL-MCNC: 81 MG/DL (ref 70–110)
HCT VFR BLD AUTO: 43.3 % (ref 40–54)
HGB BLD-MCNC: 12.3 G/DL (ref 14–18)
IMM GRANULOCYTES # BLD AUTO: 0.02 K/UL (ref 0–0.04)
IMM GRANULOCYTES NFR BLD AUTO: 0.4 % (ref 0–0.5)
INR PPP: 1.9 (ref 0.8–1.2)
LYMPHOCYTES # BLD AUTO: 1.2 K/UL (ref 1–4.8)
LYMPHOCYTES NFR BLD: 21.5 % (ref 18–48)
LYSOZYME SERPL-MCNC: 14.7 MCG/ML (ref 2.6–6)
MAGNESIUM SERPL-MCNC: 1.9 MG/DL (ref 1.6–2.6)
MCH RBC QN AUTO: 24.5 PG (ref 27–31)
MCHC RBC AUTO-ENTMCNC: 28.4 G/DL (ref 32–36)
MCV RBC AUTO: 86 FL (ref 82–98)
MONOCYTES # BLD AUTO: 0.5 K/UL (ref 0.3–1)
MONOCYTES NFR BLD: 9.2 % (ref 4–15)
MYELOPEROXIDASE AB SER-ACNC: 0.4 U/ML
NEUTROPHILS # BLD AUTO: 3.6 K/UL (ref 1.8–7.7)
NEUTROPHILS NFR BLD: 65 % (ref 38–73)
NRBC BLD-RTO: 0 /100 WBC
P-ANCA TITR SER IF: NORMAL TITER
PHOSPHATE SERPL-MCNC: 2.7 MG/DL (ref 2.7–4.5)
PHOSPHATE SERPL-MCNC: 3.7 MG/DL (ref 2.7–4.5)
PLATELET # BLD AUTO: 216 K/UL (ref 150–450)
PMV BLD AUTO: 10.5 FL (ref 9.2–12.9)
POTASSIUM SERPL-SCNC: 3.1 MMOL/L (ref 3.5–5.1)
POTASSIUM SERPL-SCNC: 3.5 MMOL/L (ref 3.5–5.1)
PROT SERPL-MCNC: 7.7 G/DL (ref 6–8.4)
PROTHROMBIN TIME: 20.1 SEC (ref 9–12.5)
RBC # BLD AUTO: 5.02 M/UL (ref 4.6–6.2)
SODIUM SERPL-SCNC: 138 MMOL/L (ref 136–145)
SODIUM SERPL-SCNC: 141 MMOL/L (ref 136–145)
WBC # BLD AUTO: 5.57 K/UL (ref 3.9–12.7)

## 2024-07-25 PROCEDURE — 27100171 HC OXYGEN HIGH FLOW UP TO 24 HOURS

## 2024-07-25 PROCEDURE — 97530 THERAPEUTIC ACTIVITIES: CPT

## 2024-07-25 PROCEDURE — 63600175 PHARM REV CODE 636 W HCPCS

## 2024-07-25 PROCEDURE — 94640 AIRWAY INHALATION TREATMENT: CPT

## 2024-07-25 PROCEDURE — 99232 SBSQ HOSP IP/OBS MODERATE 35: CPT | Mod: GC,,, | Performed by: INTERNAL MEDICINE

## 2024-07-25 PROCEDURE — 85025 COMPLETE CBC W/AUTO DIFF WBC: CPT

## 2024-07-25 PROCEDURE — 99900035 HC TECH TIME PER 15 MIN (STAT)

## 2024-07-25 PROCEDURE — 25000003 PHARM REV CODE 250: Performed by: STUDENT IN AN ORGANIZED HEALTH CARE EDUCATION/TRAINING PROGRAM

## 2024-07-25 PROCEDURE — 94660 CPAP INITIATION&MGMT: CPT

## 2024-07-25 PROCEDURE — 80069 RENAL FUNCTION PANEL: CPT | Performed by: STUDENT IN AN ORGANIZED HEALTH CARE EDUCATION/TRAINING PROGRAM

## 2024-07-25 PROCEDURE — 20600001 HC STEP DOWN PRIVATE ROOM

## 2024-07-25 PROCEDURE — 80053 COMPREHEN METABOLIC PANEL: CPT

## 2024-07-25 PROCEDURE — 83735 ASSAY OF MAGNESIUM: CPT

## 2024-07-25 PROCEDURE — 25000003 PHARM REV CODE 250

## 2024-07-25 PROCEDURE — A4216 STERILE WATER/SALINE, 10 ML: HCPCS | Performed by: INTERNAL MEDICINE

## 2024-07-25 PROCEDURE — 84100 ASSAY OF PHOSPHORUS: CPT

## 2024-07-25 PROCEDURE — 85610 PROTHROMBIN TIME: CPT

## 2024-07-25 PROCEDURE — 94761 N-INVAS EAR/PLS OXIMETRY MLT: CPT

## 2024-07-25 PROCEDURE — 99233 SBSQ HOSP IP/OBS HIGH 50: CPT | Mod: ,,, | Performed by: EMERGENCY MEDICINE

## 2024-07-25 PROCEDURE — 25000003 PHARM REV CODE 250: Performed by: INTERNAL MEDICINE

## 2024-07-25 PROCEDURE — 97116 GAIT TRAINING THERAPY: CPT | Mod: CQ

## 2024-07-25 RX ORDER — POTASSIUM CHLORIDE 750 MG/1
30 CAPSULE, EXTENDED RELEASE ORAL
Status: COMPLETED | OUTPATIENT
Start: 2024-07-25 | End: 2024-07-25

## 2024-07-25 RX ORDER — POTASSIUM CHLORIDE 20 MEQ/1
40 TABLET, EXTENDED RELEASE ORAL ONCE
Status: COMPLETED | OUTPATIENT
Start: 2024-07-25 | End: 2024-07-25

## 2024-07-25 RX ADMIN — FLUTICASONE PROPIONATE AND SALMETEROL 1 PUFF: 50; 250 POWDER RESPIRATORY (INHALATION) at 08:07

## 2024-07-25 RX ADMIN — POTASSIUM CHLORIDE 30 MEQ: 750 CAPSULE, EXTENDED RELEASE ORAL at 01:07

## 2024-07-25 RX ADMIN — PANTOPRAZOLE SODIUM 40 MG: 40 TABLET, DELAYED RELEASE ORAL at 08:07

## 2024-07-25 RX ADMIN — SENNOSIDES AND DOCUSATE SODIUM 1 TABLET: 50; 8.6 TABLET ORAL at 08:07

## 2024-07-25 RX ADMIN — AMIODARONE HYDROCHLORIDE 400 MG: 200 TABLET ORAL at 08:07

## 2024-07-25 RX ADMIN — FUROSEMIDE 40 MG/HR: 10 INJECTION, SOLUTION INTRAMUSCULAR; INTRAVENOUS at 12:07

## 2024-07-25 RX ADMIN — FUROSEMIDE 40 MG/HR: 10 INJECTION, SOLUTION INTRAMUSCULAR; INTRAVENOUS at 08:07

## 2024-07-25 RX ADMIN — POTASSIUM CHLORIDE 40 MEQ: 1500 TABLET, EXTENDED RELEASE ORAL at 06:07

## 2024-07-25 RX ADMIN — Medication 10 ML: at 05:07

## 2024-07-25 RX ADMIN — FLUTICASONE PROPIONATE AND SALMETEROL 1 PUFF: 50; 250 POWDER RESPIRATORY (INHALATION) at 10:07

## 2024-07-25 RX ADMIN — ALLOPURINOL 300 MG: 100 TABLET ORAL at 08:07

## 2024-07-25 RX ADMIN — ALBUTEROL SULFATE 2 PUFF: 108 INHALANT RESPIRATORY (INHALATION) at 12:07

## 2024-07-25 RX ADMIN — QUETIAPINE FUMARATE 50 MG: 25 TABLET ORAL at 09:07

## 2024-07-25 RX ADMIN — ALBUTEROL SULFATE 2 PUFF: 108 INHALANT RESPIRATORY (INHALATION) at 06:07

## 2024-07-25 RX ADMIN — Medication 10 ML: at 06:07

## 2024-07-25 RX ADMIN — POTASSIUM CHLORIDE 30 MEQ: 750 CAPSULE, EXTENDED RELEASE ORAL at 03:07

## 2024-07-25 RX ADMIN — AMIODARONE HYDROCHLORIDE 400 MG: 200 TABLET ORAL at 09:07

## 2024-07-25 RX ADMIN — ALBUTEROL SULFATE 2 PUFF: 108 INHALANT RESPIRATORY (INHALATION) at 08:07

## 2024-07-25 NOTE — PLAN OF CARE
Pt AAOx4, VSS on 40L High flow NC. SpO2 maintained >88%. NSR on tele. Pt with dobutamine infusing @ 7.8cc/h and lasix infusing @ 4cc/h. 1.5L FR maintained, pt voiding clear yellow urine, 1 BM overnight--see flowsheet for I/O. Pt up with standby assist, remains free from falls/injury. Bed in  lowest locked position, call light within reach, POC ongoing.

## 2024-07-25 NOTE — PROGRESS NOTES
"[facility] - Transplant Stepdown  Palliative Medicine  Progress Note    Patient Name: Yong Mcintyre  MRN: [id number]  Admission Date: 7/4/2024  Hospital Length of Stay: 21 days  Code Status: Full Code   Attending Provider: Geeta Mendenhall MD  Consulting Provider: Torrie Pretty MD  Primary Care Physician: Gianni Escalona MD  Principal Problem:Right ventricular dysfunction    Patient information was obtained from patient and primary team.      Assessment/Plan:     Palliative Care  Palliative care encounter  Pt is a 47 y/o male with severe RV failure 2/2 Pulmonary HTN.  Patient admitted almost 3 weeks ago for diuresis but has had worsening respiratory failure requiring escalating oxygen requirements during his hospital stay    Pulm HTN/ILD/paroxysmal a-fib/atrial flutter/acute on chronic right sides heart failure/acute hypoxic resp failure  -management per primary team and other consultants     Code status: Full     Surrogate decision maker: Has HCPOA document naming father.  Pt would want his sister Paula to be his surrogate decision maker.  Patient confirmed again today that he would like to go live with his sister in [place] and that she is planning on taking care of him.    GOC/ACP  7/25/24  I discussed the patient's discharge plans with him, in particular LTAC acceptance in the [place] area.  While patient said that he is agreeable to the plan for transfer there, he expressed a lot of frustration and disappointment in this plan as he really felt like he was going to be well enough after this hospitalization to be discharged directly home in [place], be able to take care of his affairs including his home and drive his brown new truck to [place] himself.  He said repeatedly that "this is not the way I wanted to do it" but also said he is willing to go along with this plan as he feels like his sister's ultimately in charge and he does not want to challenge her.  7/23/24  We met with the patient, Dr. Patel " and Dr. Leary from the CCU team and the patient's sister, Paula, via phone  We updated the patient and the sister on current challenges the patient is facing including most importantly his high oxygen requirement.  Sister emphasized repeatedly the importance of moving the patient to [place] as he would have considerably more support in that [facility] than he does in [place].  We shared are worried that if the patient's oxygen requirement does not decreased, there may not be a feasible way to transport the patient.  We discussed different oxygen delivery devices and explained the limitations of them.  Sister requesting patient be transferred to a hospital in the [place] area.  Dr. Leary shared her worries that the patient would not meet criteria for transfer to a new hospital system but assured the patient and his sister that she would make a referral to the transfer center.  We tried to discuss disposition options for the patient should the transfer request fail, sister and patient not really open to discussing alternative plans.  7/22/24  Patient feeling very hopeful about the future as he has noted improvements in his strength and function over the past couple of days, stating he was able to walk around for the 1st time.  Also he has had a decrease in his oxygen requirement from 60 to 40 L. he is insistent that he will leave the hospital, make it to [place] where he plans to live with his family.  He reports feeling like he has in a MCC here and states that he is eager to be discharged but does not want to be discharged until he is medically ready.  He does not feel like he is at the end of his life.  He is still hopeful to returned to working out at the gym, driving his new car.  When asked where he would like to be at the very end of his life, patient states that he would prefer to be in a hospital setting as he feels like he had get the best care there.  In terms of what kind of treatment he would want to  the end of his life, he does not want to make any of those decisions right now and would want to defer to his family and their best judgment when that time comes.  7/19/24  -Met with patient at bedside. Introduced palliative medicine. He did not remember prior conversations with CNS [place]. Patient was very surprised to hear he was on 60L. After much explaining and reinforcing he was able to understand this is much more than his home 3-4L . He said he wished he would have known how bad things were. Said he was expecting to be discharged next week to drive his new car. His plan was to move to [place] to be closer to family. Explained that unfortunately the amount of O2 he is on can only be done in the hospital. Began discussion of options moving forward - continuing what we are doing here in the hospital until he no longer finds it acceptable or decompensates further. Other option would be weaning it off with comfort meds in which he is likely to die here (did not go into detail about this). Pt says he feels like his parents would want to keep him alive forever even if on machines. When asked how he feels about this he said he is just hoping this doesn't actually happen.    Prognosis:  While I do believe that patient is on a dying trajectory, as supported by his frequent hospitalizations and decline in his overall function, it does seem possible that he will recover from this acute episode.      Summary/recommendations:  -Goals: patient's ultimate goal is to improve to the point of being independent, mobile, driving his truck, going to the gym.  He also wants to get to [place] to live with his family.  At this point in time, he does not seem capable of imagining a future in which he does not recover.   -Code status:  Full  -a LTACH in the [facility] has accepted the patient for admission.  It is not clear whether patient's insurance will cover a medical transport to the facility however patient and his sister both  stating that they have enough money to find a medical transport.  While the patient was hopeful to recover to the point of being able to be discharged from this hospital to home in [place], I do think that this is the best case scenario for him and that we should move forward with this plan to get him transferred to the [facility] [place].  Patient is agreeable.        I will sign off. Please contact us if you have any additional questions.  Discharge plan seems clear, patient's goals of care are also well established.  Please feel free to reach out to us if any new challenges arise with this patient.    Subjective:     Chief Complaint:   Chief Complaint   Patient presents with    Leg Swelling     On home oxygen 3l, gave self 3 puffs albuterol in triage, said got sob with walking        HPI:   Pt is a 48 y.o. male with severe pulmonary HTN (Group 2-3, on 3L home O2, diagnosed prior to 2015, follows with Dr. Child at [facility], MALLIKA, Obesity hypoventilation syndrome, HFpEF, Paroxysmal AFib (on Coumadin), BMI > 35, HTN who presents for worsening shortness of breath and lower extremity swelling over the past 2 weeks. This is his 5th admission this year. He reports compliance with his medications. His diuretic regimen was adjusted to the following; Torsemide 60 mg twice daily and metolazone 2.5 on M/WF/Sunday. Clinically reports NYHA class III symptoms. He reports his dry weight is 223lbs and is currently weighing in at 245lbs. He uses 3L of O2 at home and is currently on high flow 35L at 45%     Hospital Course:  No notes on file    Interval History:   -on BIPAP on my arrival, transitioned to HFNC, FIO2 initially at 21% and pt desatted very quickly, able to titrate back up to 45L, 50% FIO2 and pt's O2 sat normalized  -still getting up and ambulating without much difficulty  -labs stable    Past Medical History:   Diagnosis Date    Arthritis     CHF (congestive heart failure)     Diastolic dysfunction    Cor pulmonale  "11/05/2012    Gallstones     GERD (gastroesophageal reflux disease)     Hypertension     Morbid obesity 11/05/2012    Obesity hypoventilation syndrome     On home oxygen therapy 03/07/2014    MALLIKA (obstructive sleep apnea)     BPAP 16/11 with 3 L at night (continuous O2).    Paroxysmal atrial fibrillation     PFO (patent foramen ovale) 11/05/2012    status post closure    Pickwickian syndrome 11/05/2012    Pulmonary hypertension        Past Surgical History:   Procedure Laterality Date    CHOLECYSTECTOMY      RIGHT HEART CATHETERIZATION Right 03/22/2022    Procedure: INSERTION, CATHETER, RIGHT HEART;  Surgeon: Tony Martínez MD;  Location: [facility] CATH LAB;  Service: Cardiology;  Laterality: Right;    RIGHT HEART CATHETERIZATION Right 01/31/2024    Procedure: INSERTION, CATHETER, RIGHT HEART;  Surgeon: Sheri Ritter MD;  Location: [facility] CATH LAB;  Service: Cardiology;  Laterality: Right;    UPPER GASTROINTESTINAL ENDOSCOPY      2-3 years ago       Review of patient's allergies indicates:   Allergen Reactions    Lipitor [atorvastatin] Other (See Comments)     "it makes my legs feel like jelly"  Other reaction(s): causes legs to hurt       Medications:  Continuous Infusions:   DOBUTamine IV infusion (non-titrating)  5 mcg/kg/min Intravenous Continuous 7.8 mL/hr at 07/24/24 2119 5 mcg/kg/min at 07/24/24 2119    furosemide (Lasix) 500 mg in 50 mL infusion (conc: 10 mg/mL)  40 mg/hr Intravenous Continuous 4 mL/hr at 07/25/24 0839 40 mg/hr at 07/25/24 0839     Scheduled Meds:   albuterol  2 puff Inhalation Q6H    allopurinoL  300 mg Oral Daily    amiodarone  400 mg Oral BID    Followed by    [START ON 7/27/2024] amiodarone  200 mg Oral Daily    fluticasone-salmeterol 250-50 mcg/dose  1 puff Inhalation BID    pantoprazole  40 mg Oral Daily    QUEtiapine  50 mg Oral QHS    senna-docusate 8.6-50 mg  1 tablet Oral Daily    sodium chloride 0.9%  10 mL Intravenous Q6H    warfarin  5 mg Oral Once per day on Sunday Monday Tuesday " Wednesday Friday Saturday     PRN Meds:  Current Facility-Administered Medications:     0.9% NaCl, , Intravenous, PRN    0.9% NaCl, , Intravenous, PRN    acetaminophen, 650 mg, Oral, Q6H PRN    albuterol, 2 puff, Inhalation, Q4H PRN    aluminum & magnesium hydroxide-simethicone, 30 mL, Oral, Q6H PRN    glycerin adult, 1 suppository, Rectal, Daily PRN    melatonin, 6 mg, Oral, Nightly PRN    sodium chloride 0.9%, 10 mL, Intravenous, PRN    Flushing PICC/Midline Protocol, , , Until Discontinued **AND** sodium chloride 0.9%, 10 mL, Intravenous, Q6H **AND** sodium chloride 0.9%, 10 mL, Intravenous, PRN    Family History       Problem Relation (Age of Onset)    Arthritis Mother, Maternal Grandmother, Maternal Grandfather    Asthma Mother, Sister, Maternal Grandmother    Breast cancer Paternal Grandmother    Diabetes Maternal Grandmother    Down syndrome Brother    Gout Brother    Hypertension Father, Maternal Grandmother, Maternal Grandfather, Paternal Grandfather          Tobacco Use    Smoking status: Never    Smokeless tobacco: Never   Substance and Sexual Activity    Alcohol use: Yes     Comment: holidays  rare    Drug use: No    Sexual activity: Not Currently     Partners: Female       Review of Systems  Objective:     Vital Signs (Most Recent):  Temp: 98 °F (36.7 °C) (07/25/24 0844)  Pulse: 82 (07/25/24 0844)  Resp: 18 (07/25/24 0844)  BP: 111/62 (07/25/24 0844)  SpO2: (!) 94 % (07/25/24 0844) Vital Signs (24h Range):  Temp:  [97.6 °F (36.4 °C)-98.6 °F (37 °C)] 98 °F (36.7 °C)  Pulse:  [81-91] 82  Resp:  [16-30] 18  SpO2:  [89 %-96 %] 94 %  BP: ()/(50-67) 111/62     Weight: 97.9 kg (215 lb 13.3 oz)  Body mass index is 37.05 kg/m².       Physical Exam  Vitals and nursing note reviewed.   Constitutional:       Interventions: Nasal cannula in place.   HENT:      Head: Normocephalic and atraumatic.   Cardiovascular:      Rate and Rhythm: Normal rate.   Pulmonary:      Effort: Pulmonary effort is normal. No  respiratory distress.      Comments: Comfort flow on at 60 L 64 %  Abdominal:      General: There is distension.   Skin:     General: Skin is warm and dry.   Neurological:      General: No focal deficit present.      Mental Status: He is alert and oriented to person, place, and time.   Psychiatric:         Behavior: Behavior is cooperative.            Review of Symptoms      Symptom Assessment (ESAS 0-10 Scale)  Pain:  0  Dyspnea:  0  Anxiety:  0  Nausea:  0  Depression:  0  Anorexia:  0  Fatigue:  0  Insomnia:  0  Restlessness:  0  Agitation:  0         Performance Status:  70    Living Arrangements:  Lives alone    Psychosocial/Cultural:   See Palliative Psychosocial Note: No  Pt lives alone, no adult children. Pt's Dad lives in [facility] and pt's mother lives in [place]. Per pt, they are both aware of his medical issues they get along.   **Primary  to Follow**  Palliative Care  Consult: Yes        Advance Care Planning  Advance Directives:   Living Will: No    LaPOST: No    Do Not Resuscitate Status: No    Medical Power of : Yes    Goals of Care: What is most important right now is to focus on extending life as long as possible, even it it means sacrificing quality, curative/life-prolongation (regardless of treatment burdens). Accordingly, we have decided that the best plan to meet the patient's goals includes continuing with treatment.         Significant Labs: All pertinent labs within the past 24 hours have been reviewed.  CBC:   Recent Labs   Lab 07/25/24 0527   WBC 5.57   HGB 12.3*   HCT 43.3   MCV 86        BMP:  Recent Labs   Lab 07/25/24 0527   GLU 81      K 3.1*   CL 96   CO2 37*   BUN 47*   CREATININE 2.0*   CALCIUM 9.3   MG 1.9     LFT:  Lab Results   Component Value Date    AST 17 07/25/2024    ALKPHOS 134 07/25/2024    BILITOT 1.0 07/25/2024     Albumin:   Albumin   Date Value Ref Range Status   07/25/2024 2.7 (L) 3.5 - 5.2 g/dL Final     Protein:    Total Protein   Date Value Ref Range Status   07/25/2024 7.7 6.0 - 8.4 g/dL Final     Lactic acid:   Lab Results   Component Value Date    LACTATE 1.1 07/05/2024    LACTATE 1.1 04/22/2024       Significant Imaging: I have reviewed all pertinent imaging results/findings within the past 24 hours.      CT chest 7/1Impression:     Similar appearance of diffuse ground-glass opacity, suggestive of pulmonary edema.     Dilatation of the main pulmonary artery compatible with pulmonary hypertension.     Cardiomegaly.   8/24      Torrie Pretty MD  Palliative Medicine  [facility] - Transplant Stepdown

## 2024-07-25 NOTE — PLAN OF CARE
Pt aao x3. Pt on Bipap at night and High flow NC during waking hours. 40L at 42%. Cardiology consulted. K=3.1. K replaced as per order. Pt desats. O2 continuously monitored. Tele reveals NSR. Doctor talking to case management to transfer pt on high O2 to [place].  reaching out to family to discuss if plan can realistically happen. Vss. No acute distress. See assessment for full chart details. Will continue to monitor, assess and adjust care as needed.

## 2024-07-25 NOTE — PLAN OF CARE
"   [facility]     Department of Hospital Medicine     [address] [place] [address]     [phone] after hours  [phone] fax                                        FACILITY TRANSFER ORDERS     07/28/2024    Admit to:  LTAC    Diagnoses:  Active Hospital Problems    Diagnosis  POA    *Right ventricular dysfunction [I51.9]  Yes    Acute decompensated heart failure [I50.9]  Yes    Atypical atrial flutter [I48.4]  No    Acute on chronic right-sided congestive heart failure [I50.813]  Yes    Acute on chronic respiratory failure with hypoxia [J96.21]  Yes    Metabolic alkalosis [E87.3]  Yes    Advance care planning [Z71.89]  Not Applicable    Paroxysmal A-fib [I48.0]  Yes    Acute on chronic diastolic CHF (congestive heart failure), NYHA class 4 [I50.33]  Yes    Palliative care encounter [Z51.5]  Not Applicable    MALLIKA (obstructive sleep apnea) [G47.33]  Yes     Chronic    Pulmonary hypertension [I27.20]  Yes     Chronic     WHO group 2-3 per his managing pulmonologist (Danyell at [facility])      Hypoventilation syndrome [R06.89]  Yes      Resolved Hospital Problems   No resolved problems to display.       Vital Signs: Routine.    Allergies:  Review of patient's allergies indicates:   Allergen Reactions    Lipitor [atorvastatin] Other (See Comments)     "it makes my legs feel like jelly"  Other reaction(s): causes legs to hurt       Diet: low sodium high protein diet  Ensure plus with meals TID  Fluid restriction 1000 mL    Acitivities: as tolerated    Nursing: per unit routine    High flow nasal cannula  Fio 40% at 40L  BIPAP 40% 15/5 qhs and naps    *During transport - Patient will be on BIPAP 40% 15/5 continuous  If available, he can switch to FiO2 40% 40 L for meals.    Goal saturations 88-92%    Labs: per unit routine    Consults: cardiology if available, PT/OT    Current Discharge Medication List        START taking these medications    Details   acetaminophen " (TYLENOL) 325 MG tablet Take 2 tablets (650 mg total) by mouth every 6 (six) hours as needed (for any pain or temp >100).      acetaZOLAMIDE (DIAMOX) 250 MG tablet Take 1 tablet (250 mg total) by mouth 2 (two) times daily.      amiodarone (PACERONE) 200 MG Tab Take 1 tablet (200 mg total) by mouth once daily. Start 7/28/2024      DOBUTamine (DOBUTREX) 1,000 mg/250 mL (4,000 mcg/mL) infusion Inject 518 mcg/min into the vein continuous.      furosemide 10 mg/mL Soln 500 mg infusion Inject 40 mg/hr into the vein continuous.      QUEtiapine (SEROQUEL) 50 MG tablet Take 1 tablet (50 mg total) by mouth every evening.      senna-docusate 8.6-50 mg (PERICOLACE) 8.6-50 mg per tablet Take 1 tablet by mouth once daily.           CONTINUE these medications which have CHANGED    Details   !! albuterol (PROVENTIL/VENTOLIN HFA) 90 mcg/actuation inhaler Inhale 2 puffs into the lungs every 6 (six) hours. Rescue      !! albuterol (VENTOLIN HFA) 90 mcg/actuation inhaler Inhale 2 puffs into the lungs every 4 (four) hours as needed for Wheezing. Rescue      potassium chloride (MICRO-K) 10 MEQ CpSR Take 3 capsules (30 mEq total) by mouth daily with breakfast.      warfarin (COUMADIN) 5 MG tablet Takes 5 mg every day except Thursday. Takes no warfarin at all on Thursdays.       !! - Potential duplicate medications found. Please discuss with provider.        CONTINUE these medications which have NOT CHANGED    Details   allopurinoL (ZYLOPRIM) 300 MG tablet Take 1 tablet (300 mg total) by mouth once daily.  Qty: 90 tablet, Refills: 3    Associated Diagnoses: Chronic pulmonary heart disease; Primary pulmonary hypertension; Atrial fibrillation, unspecified type; Pulmonary hypertension; PFO (patent foramen ovale); Paroxysmal atrial fibrillation; Cor pulmonale; Pickwickian syndrome; Hyperthyroidism      pantoprazole (PROTONIX) 40 MG tablet Take 1 tablet (40 mg total) by mouth once daily.  Qty: 90 tablet, Refills: 3      tiotropium (SPIRIVA) 18  mcg inhalation capsule Inhale 18 mcg into the lungs once daily.      WIXELA INHUB 250-50 mcg/dose diskus inhaler 1 puff 2 (two) times daily. USE 1 INHALATION BY MOUTH TWICE DAILY             After discharge refer patient to [facility] or [facility] for heart lung transplant evaluation  After discharge refer patient to advance heart failure clinic and advance lung disease clinic in the area    _________________________________  Geeta Mendenhall MD  07/24/2024

## 2024-07-25 NOTE — SUBJECTIVE & OBJECTIVE
"Interval History:   -on BIPAP on my arrival, transitioned to HFNC, FIO2 initially at 21% and pt desatted very quickly, able to titrate back up to 45L, 50% FIO2 and pt's O2 sat normalized  -still getting up and ambulating without much difficulty  -labs stable    Past Medical History:   Diagnosis Date    Arthritis     CHF (congestive heart failure)     Diastolic dysfunction    Cor pulmonale 11/05/2012    Gallstones     GERD (gastroesophageal reflux disease)     Hypertension     Morbid obesity 11/05/2012    Obesity hypoventilation syndrome     On home oxygen therapy 03/07/2014    MALLIKA (obstructive sleep apnea)     BPAP 16/11 with 3 L at night (continuous O2).    Paroxysmal atrial fibrillation     PFO (patent foramen ovale) 11/05/2012    status post closure    Pickwickian syndrome 11/05/2012    Pulmonary hypertension        Past Surgical History:   Procedure Laterality Date    CHOLECYSTECTOMY      RIGHT HEART CATHETERIZATION Right 03/22/2022    Procedure: INSERTION, CATHETER, RIGHT HEART;  Surgeon: Tony Martínez MD;  Location: [facility] CATH LAB;  Service: Cardiology;  Laterality: Right;    RIGHT HEART CATHETERIZATION Right 01/31/2024    Procedure: INSERTION, CATHETER, RIGHT HEART;  Surgeon: Sheri Ritter MD;  Location: [facility] CATH LAB;  Service: Cardiology;  Laterality: Right;    UPPER GASTROINTESTINAL ENDOSCOPY      2-3 years ago       Review of patient's allergies indicates:   Allergen Reactions    Lipitor [atorvastatin] Other (See Comments)     "it makes my legs feel like jelly"  Other reaction(s): causes legs to hurt       Medications:  Continuous Infusions:   DOBUTamine IV infusion (non-titrating)  5 mcg/kg/min Intravenous Continuous 7.8 mL/hr at 07/24/24 2119 5 mcg/kg/min at 07/24/24 2119    furosemide (Lasix) 500 mg in 50 mL infusion (conc: 10 mg/mL)  40 mg/hr Intravenous Continuous 4 mL/hr at 07/25/24 0839 40 mg/hr at 07/25/24 0839     Scheduled Meds:   albuterol  2 puff Inhalation Q6H    allopurinoL  300 mg Oral " Daily    amiodarone  400 mg Oral BID    Followed by    [START ON 7/27/2024] amiodarone  200 mg Oral Daily    fluticasone-salmeterol 250-50 mcg/dose  1 puff Inhalation BID    pantoprazole  40 mg Oral Daily    QUEtiapine  50 mg Oral QHS    senna-docusate 8.6-50 mg  1 tablet Oral Daily    sodium chloride 0.9%  10 mL Intravenous Q6H    warfarin  5 mg Oral Once per day on Sunday Monday Tuesday Wednesday Friday Saturday     PRN Meds:  Current Facility-Administered Medications:     0.9% NaCl, , Intravenous, PRN    0.9% NaCl, , Intravenous, PRN    acetaminophen, 650 mg, Oral, Q6H PRN    albuterol, 2 puff, Inhalation, Q4H PRN    aluminum & magnesium hydroxide-simethicone, 30 mL, Oral, Q6H PRN    glycerin adult, 1 suppository, Rectal, Daily PRN    melatonin, 6 mg, Oral, Nightly PRN    sodium chloride 0.9%, 10 mL, Intravenous, PRN    Flushing PICC/Midline Protocol, , , Until Discontinued **AND** sodium chloride 0.9%, 10 mL, Intravenous, Q6H **AND** sodium chloride 0.9%, 10 mL, Intravenous, PRN    Family History       Problem Relation (Age of Onset)    Arthritis Mother, Maternal Grandmother, Maternal Grandfather    Asthma Mother, Sister, Maternal Grandmother    Breast cancer Paternal Grandmother    Diabetes Maternal Grandmother    Down syndrome Brother    Gout Brother    Hypertension Father, Maternal Grandmother, Maternal Grandfather, Paternal Grandfather          Tobacco Use    Smoking status: Never    Smokeless tobacco: Never   Substance and Sexual Activity    Alcohol use: Yes     Comment: holidays  rare    Drug use: No    Sexual activity: Not Currently     Partners: Female       Review of Systems  Objective:     Vital Signs (Most Recent):  Temp: 98 °F (36.7 °C) (07/25/24 0844)  Pulse: 82 (07/25/24 0844)  Resp: 18 (07/25/24 0844)  BP: 111/62 (07/25/24 0844)  SpO2: (!) 94 % (07/25/24 0844) Vital Signs (24h Range):  Temp:  [97.6 °F (36.4 °C)-98.6 °F (37 °C)] 98 °F (36.7 °C)  Pulse:  [81-91] 82  Resp:  [16-30] 18  SpO2:  [89  %-96 %] 94 %  BP: ()/(50-67) 111/62     Weight: 97.9 kg (215 lb 13.3 oz)  Body mass index is 37.05 kg/m².       Physical Exam  Vitals and nursing note reviewed.   Constitutional:       Interventions: Nasal cannula in place.   HENT:      Head: Normocephalic and atraumatic.   Cardiovascular:      Rate and Rhythm: Normal rate.   Pulmonary:      Effort: Pulmonary effort is normal. No respiratory distress.      Comments: Comfort flow on at 60 L 64 %  Abdominal:      General: There is distension.   Skin:     General: Skin is warm and dry.   Neurological:      General: No focal deficit present.      Mental Status: He is alert and oriented to person, place, and time.   Psychiatric:         Behavior: Behavior is cooperative.            Review of Symptoms      Symptom Assessment (ESAS 0-10 Scale)  Pain:  0  Dyspnea:  0  Anxiety:  0  Nausea:  0  Depression:  0  Anorexia:  0  Fatigue:  0  Insomnia:  0  Restlessness:  0  Agitation:  0         Performance Status:  70    Living Arrangements:  Lives alone    Psychosocial/Cultural:   See Palliative Psychosocial Note: No  Pt lives alone, no adult children. Pt's Dad [facility] and pt's mother lives in [place]. Per pt, they are both aware of his medical issues they get along.   **Primary  to Follow**  Palliative Care  Consult: Yes        Advance Care Planning   Advance Directives:   Living Will: No    LaPOST: No    Do Not Resuscitate Status: No    Medical Power of : Yes    Goals of Care: What is most important right now is to focus on extending life as long as possible, even it it means sacrificing quality, curative/life-prolongation (regardless of treatment burdens). Accordingly, we have decided that the best plan to meet the patient's goals includes continuing with treatment.         Significant Labs: All pertinent labs within the past 24 hours have been reviewed.  CBC:   Recent Labs   Lab 07/25/24  0527   WBC 5.57   HGB 12.3*   HCT 43.3    MCV 86        BMP:  Recent Labs   Lab 07/25/24  0527   GLU 81      K 3.1*   CL 96   CO2 37*   BUN 47*   CREATININE 2.0*   CALCIUM 9.3   MG 1.9     LFT:  Lab Results   Component Value Date    AST 17 07/25/2024    ALKPHOS 134 07/25/2024    BILITOT 1.0 07/25/2024     Albumin:   Albumin   Date Value Ref Range Status   07/25/2024 2.7 (L) 3.5 - 5.2 g/dL Final     Protein:   Total Protein   Date Value Ref Range Status   07/25/2024 7.7 6.0 - 8.4 g/dL Final     Lactic acid:   Lab Results   Component Value Date    LACTATE 1.1 07/05/2024    LACTATE 1.1 04/22/2024       Significant Imaging: I have reviewed all pertinent imaging results/findings within the past 24 hours.      CT chest 7/1Impression:     Similar appearance of diffuse ground-glass opacity, suggestive of pulmonary edema.     Dilatation of the main pulmonary artery compatible with pulmonary hypertension.     Cardiomegaly.   8/24

## 2024-07-25 NOTE — SUBJECTIVE & OBJECTIVE
"Past Medical History:   Diagnosis Date    Arthritis     CHF (congestive heart failure)     Diastolic dysfunction    Cor pulmonale 11/05/2012    Gallstones     GERD (gastroesophageal reflux disease)     Hypertension     Morbid obesity 11/05/2012    Obesity hypoventilation syndrome     On home oxygen therapy 03/07/2014    MALLIKA (obstructive sleep apnea)     BPAP 16/11 with 3 L at night (continuous O2).    Paroxysmal atrial fibrillation     PFO (patent foramen ovale) 11/05/2012    status post closure    Pickwickian syndrome 11/05/2012    Pulmonary hypertension        Past Surgical History:   Procedure Laterality Date    CHOLECYSTECTOMY      RIGHT HEART CATHETERIZATION Right 03/22/2022    Procedure: INSERTION, CATHETER, RIGHT HEART;  Surgeon: Tony Martínez MD;  Location: [facility] CATH LAB;  Service: Cardiology;  Laterality: Right;    RIGHT HEART CATHETERIZATION Right 01/31/2024    Procedure: INSERTION, CATHETER, RIGHT HEART;  Surgeon: Sheri Ritter MD;  Location: [facility] CATH LAB;  Service: Cardiology;  Laterality: Right;    UPPER GASTROINTESTINAL ENDOSCOPY      2-3 years ago       Review of patient's allergies indicates:   Allergen Reactions    Lipitor [atorvastatin] Other (See Comments)     "it makes my legs feel like jelly"  Other reaction(s): causes legs to hurt       No current facility-administered medications on file prior to encounter.     Current Outpatient Medications on File Prior to Encounter   Medication Sig    allopurinoL (ZYLOPRIM) 300 MG tablet Take 1 tablet (300 mg total) by mouth once daily.    pantoprazole (PROTONIX) 40 MG tablet Take 1 tablet (40 mg total) by mouth once daily.    tiotropium (SPIRIVA) 18 mcg inhalation capsule Inhale 18 mcg into the lungs once daily.    WIXELA INHUB 250-50 mcg/dose diskus inhaler 1 puff 2 (two) times daily. USE 1 INHALATION BY MOUTH TWICE DAILY    [DISCONTINUED] sildenafil (REVATIO) 20 mg Tab Take 1 tablet (20 mg total) by mouth 3 (three) times daily.     Family History  "      Problem Relation (Age of Onset)    Arthritis Mother, Maternal Grandmother, Maternal Grandfather    Asthma Mother, Sister, Maternal Grandmother    Breast cancer Paternal Grandmother    Diabetes Maternal Grandmother    Down syndrome Brother    Gout Brother    Hypertension Father, Maternal Grandmother, Maternal Grandfather, Paternal Grandfather          Tobacco Use    Smoking status: Never    Smokeless tobacco: Never   Substance and Sexual Activity    Alcohol use: Yes     Comment: holidays  rare    Drug use: No    Sexual activity: Not Currently     Partners: Female     Review of Systems   Constitutional: Negative.   HENT: Negative.     Eyes: Negative.    Cardiovascular: Negative.    Respiratory: Negative.     Endocrine: Negative.    Skin: Negative.    Musculoskeletal: Negative.    Gastrointestinal: Negative.    Genitourinary: Negative.    Neurological: Negative.    Psychiatric/Behavioral: Negative.     Allergic/Immunologic: Negative.      Objective:     Vital Signs (Most Recent):  Temp: 98.1 °F (36.7 °C) (07/25/24 1609)  Pulse: 83 (07/25/24 1609)  Resp: 18 (07/25/24 1609)  BP: (!) 98/55 (07/25/24 1609)  SpO2: (!) 92 % (07/25/24 1609) Vital Signs (24h Range):  Temp:  [97.6 °F (36.4 °C)-98.3 °F (36.8 °C)] 98.1 °F (36.7 °C)  Pulse:  [81-95] 83  Resp:  [16-22] 18  SpO2:  [89 %-96 %] 92 %  BP: ()/(55-67) 98/55     Weight: 97.9 kg (215 lb 13.3 oz)  Body mass index is 37.05 kg/m².    SpO2: (!) 92 %         Intake/Output Summary (Last 24 hours) at 7/25/2024 1734  Last data filed at 7/25/2024 1506  Gross per 24 hour   Intake 479 ml   Output 2260 ml   Net -1781 ml       Lines/Drains/Airways       Peripherally Inserted Central Catheter Line  Duration             PICC Double Lumen 07/22/24 1120 right basilic 3 days              Peripheral Intravenous Line  Duration                  Peripheral IV - Single Lumen 07/20/24 2000 18 G Anterior;Proximal;Right Forearm 4 days                     Physical Exam  Constitutional:        Comments: Chronically ill appearing.   HENT:      Head: Normocephalic and atraumatic.   Eyes:      Extraocular Movements: Extraocular movements intact.      Conjunctiva/sclera: Conjunctivae normal.   Neck:      Comments: JVD and HJR noted  Cardiovascular:      Rate and Rhythm: Normal rate and regular rhythm.      Heart sounds:      Gallop (S3 gallop) present.   Pulmonary:      Effort: Pulmonary effort is normal.      Breath sounds: Normal breath sounds.      Comments: On HFNC  Abdominal:      General: Abdomen is flat.      Palpations: Abdomen is soft.   Musculoskeletal:      Right lower leg: Edema present.      Left lower leg: Edema present.   Skin:     General: Skin is warm and dry.   Neurological:      General: No focal deficit present.      Mental Status: He is alert and oriented to person, place, and time.   Psychiatric:         Mood and Affect: Mood normal.         Behavior: Behavior normal.          Significant Labs: CMP   Recent Labs   Lab 07/24/24  0404 07/24/24  0753 07/25/24  0527 07/25/24  1514    139 141 138   K 3.4* 3.7 3.1* 3.5   CL 94* 95 96 96   CO2 31* 38* 37* 35*   * 125* 81 136*   BUN 54* 50* 47* 42*   CREATININE 2.3* 2.1* 2.0* 2.0*   CALCIUM 9.1 9.1 9.3 9.1   PROT 7.3  --  7.7  --    ALBUMIN 2.5*  --  2.7* 2.9*   BILITOT 0.8  --  1.0  --    ALKPHOS 127  --  134  --    AST 16  --  17  --    ALT 10  --  10  --    ANIONGAP 12 6* 8 7*   , CBC   Recent Labs   Lab 07/24/24  0404 07/25/24  0527   WBC 5.80 5.57   HGB 12.2* 12.3*   HCT 42.5 43.3    216       Significant Imaging: Echocardiogram: Transthoracic echo (TTE) complete (Cupid Only):   Results for orders placed or performed during the hospital encounter of 07/04/24   Echo   Result Value Ref Range    BSA 2.25 m2    LVOT stroke volume 43.90 cm3    LVIDd 5.12 3.5 - 6.0 cm    LV Systolic Volume 70.29 mL    LV Systolic Volume Index 32.7 mL/m2    LVIDs 4.01 (A) 2.1 - 4.0 cm    LV Diastolic Volume 124.80 mL    LV Diastolic Volume  "Index 58.05 mL/m2    IVS 0.46 (A) 0.6 - 1.1 cm    LVOT diameter 2.26 cm    LVOT area 4.0 cm2    FS 22 (A) 28 - 44 %    Left Ventricle Relative Wall Thickness 0.32 cm    Posterior Wall 0.81 0.6 - 1.1 cm    LV mass 106.01 g    LV Mass Index 49 g/m2    MV Peak E Rick 0.43 m/s    TDI LATERAL 0.14 m/s    TDI SEPTAL 0.08 m/s    E/E' ratio 3.91 m/s    MV Peak A Rick 0.81 m/s    TR Max Rick 4.06 m/s    E/A ratio 0.53     IVRT 54.23 msec    E wave deceleration time 125.22 msec    MV "A" wave duration 9.13 msec    LV SEPTAL E/E' RATIO 5.38 m/s    LA Volume Index 17.9 mL/m2    LV LATERAL E/E' RATIO 3.07 m/s    LA volume 38.57 cm3    PV Peak S Rick 0.25 m/s    PV Peak D Rick 0.36 m/s    Pulm vein S/D ratio 0.69     LVOT peak rick 0.67 m/s    RV- oneill basal diam 6.4 cm    TAPSE 1.77 cm    LA size 2.54 cm    Left Atrium Minor Axis 5.67 cm    Left Atrium Major Axis 5.71 cm    LA volume (mod) 22.75 cm3    LA WIDTH 3.14 cm    LA Volume Index (Mod) 10.6 mL/m2    RA Major Axis 6.05 cm    RA Width 6.10 cm    AV mean gradient 2 mmHg    AV peak gradient 3 mmHg    Ao peak rick 0.92 m/s    Ao VTI 15.35 cm    LVOT peak VTI 10.95 cm    AV valve area 2.86 cm²    AV Velocity Ratio 0.73     AV index (prosthetic) 0.71     RITIKA by Velocity Ratio 2.92 cm²    MV stenosis pressure 1/2 time 36.31 ms    MV valve area p 1/2 method 6.06 cm2    Triscuspid Valve Regurgitation Peak Gradient 66 mmHg    Sinus 2.77 cm    STJ 2.36 cm    Ascending aorta 2.51 cm    Mean e' 0.11 m/s    ZLVIDS -0.46     ZLVIDD -3.10     TV resting pulmonary artery pressure 81 mmHg    RV TB RVSP 19 mmHg    Est. RA pres 15 mmHg    RA area length vol 119.0 mL    RA area 31.0 cm2    Narrative      Left Ventricle: Mild global hypokinesis present. Septal flattening in   diastole and systole consistent with right ventricular volume and pressure   overload. There is moderately reduced systolic function with a visually   estimated ejection fraction of 35 - 40%. Grade I diastolic dysfunction. "   Eatimated CO is 3.4 l/min    Right Ventricle: Severe right ventricular enlargement. Wall thickness   is normal. Right ventricle wall motion has global hypokinesis. Systolic   function is severely reduced.    Right Atrium: Right atrium is severely dilated.    Aortic Valve: The aortic valve is a trileaflet valve. There is mild   aortic valve sclerosis. There is moderate annular calcification present.    Tricuspid Valve: There is moderate regurgitation with an anteromedial   eccentrically directed jet.    Aorta: Aortic root is normal in size measuring 2.77 cm. Ascending aorta   is normal measuring 2.51 cm.    Pulmonary Artery: There is severe pulmonary hypertension. The estimated   pulmonary artery systolic pressure is 81 mmHg.    IVC/SVC: Elevated venous pressure at 15 mmHg.

## 2024-07-25 NOTE — PLAN OF CARE
[facility] - Transplant Stepdown  Discharge Reassessment    Primary Care Provider: Gianni Escalona MD    Expected Discharge Date: 7/26/2024    Reassessment (most recent)       Discharge Reassessment - 07/25/24 1507          Discharge Reassessment    Assessment Type Discharge Planning Reassessment     Communicated ESTEBAN with patient/caregiver Date not available/Unable to determine     Discharge Plan A Long-term acute care facility (LTAC)     Discharge Plan B Long-term acute care facility (LTAC)     DME Needed Upon Discharge  other (see comments)   TBD    Transition of Care Barriers None     Why the patient remains in the hospital Requires continued medical care                     Discharge Plan A and Plan B have been determined by review of patient's clinical status, future medical and therapeutic needs, and coverage/benefits for post-acute care in coordination with multidisciplinary team members.      Elijah Granados LCSW  Case Management [facility]

## 2024-07-25 NOTE — PT/OT/SLP PROGRESS
"Physical Therapy Treatment    Patient Name:  Yong Mcintyre   MRN:  [id number]    Recommendations:     Discharge Recommendations: Low Intensity Therapy  Discharge Equipment Recommendations: none  Barriers to discharge: Increased resp. Support required    Assessment:     Yong Mcintyre is a 48 y.o. male admitted with a medical diagnosis of Right ventricular dysfunction.  He presents with the following impairments/functional limitations: impaired endurance, impaired self care skills, impaired functional mobility, gait instability, impaired balance, impaired cardiopulmonary response to activity Pt was agreeable to skilled therapy and in room ambulation. Pt demonstrated decreased safety awareness and often continued to perform activities despite PTA asking for a seated break secondary to drop in O2 sat, RN was notified. Pt did not have LOB but O2 dropped to 80% when ambulating 20 ft. Continued skilled care is recommended.    Rehab Prognosis: Good; patient would benefit from acute skilled PT services to address these deficits and reach maximum level of function.    Recent Surgery: Procedure(s) (LRB):  Cardioversion or Defibrillation (N/A) 9 Days Post-Op    Plan:     During this hospitalization, patient to be seen 2 x/week to address the identified rehab impairments via gait training, neuromuscular re-education, therapeutic activities, therapeutic exercises and progress toward the following goals:    Plan of Care Expires:  08/11/24    Subjective     Chief Complaint: "you don't know how strong I am."  Patient/Family Comments/goals: none verbalized  Pain/Comfort:  Pain Rating 1: 0/10      Objective:     Communicated with nursing (Cathy) prior to session.  Patient found sitting edge of bed with telemetry, PICC line, oxygen, peripheral IV, pulse ox (continuous) upon PT entry to room.     General Precautions: Standard, fall  Orthopedic Precautions: N/A  Braces: N/A  Respiratory Status: High flow, flow 40 L/min, concentration " 40%     Functional Mobility:  Transfers:     Sit to Stand:  supervision with no AD  Gait: 30 ft+ seated break+20 ft  O2 would not remain >88% with ambulation  Therapeutic Exercise: Patient performed 2 set(s) of 10 repetitions of  the following seated exercises: ankle pumps, long arc quads, and marches for bilateral LE. Patient required skilled PT for instruction of exercises and appropriate cues to perform exercises safely and appropriately.       AM-PAC 6 CLICK MOBILITY  Turning over in bed (including adjusting bedclothes, sheets and blankets)?: 3  Sitting down on and standing up from a chair with arms (e.g., wheelchair, bedside commode, etc.): 4  Moving from lying on back to sitting on the side of the bed?: 3  Moving to and from a bed to a chair (including a wheelchair)?: 4  Need to walk in hospital room?: 4  Climbing 3-5 steps with a railing?: 3  Basic Mobility Total Score: 21       Treatment & Education:  Patient provided with daily orientation and goals of this PT session. They were educated to call for assistance and to transfer with hospital staff only.  Also, pt was educated on the effects of prolonged immobility and the importance of performing OOB activity and exercises to promote healing and reduce recovery time.    Patient left up in chair with all lines intact, call button in reach, and RN notified..    GOALS:   Multidisciplinary Problems       Physical Therapy Goals          Problem: Physical Therapy    Goal Priority Disciplines Outcome Goal Variances Interventions   Physical Therapy Goal     PT, PT/OT Progressing     Description: Goals to be met by: 2024     Patient will increase functional independence with mobility by performin. Supine to sit with [place]  2. Sit to stand transfer with [place]  3. Gait  x 150 feet with Supervision using No Assistive Device.   4. Ascend/descend 10 stair with right Handrails Supervision using No Assistive Device.                          Time  Tracking:     PT Received On: 07/25/24  PT Start Time: 1017     PT Stop Time: 1027  PT Total Time (min): 10 min     Billable Minutes: Gait Training 10    Treatment Type: Treatment  PT/PTA: PTA     Number of PTA visits since last PT visit: 1 07/25/2024

## 2024-07-25 NOTE — PLAN OF CARE
CLIFFORD placed call to Qi @ [facility]  [phone] They have not filed for authorization CLIFFORD requested that they please file for authorization with [employer] and to let us know if the transfer is approved CLIFFORD provided the contact information for CLIFFORD and DINORA Elijah Granados Qi stated she would do so   CLIFFORD WC  1123 CLIFFORD and DINORA called the patients sister to update her on the progress of discharge we explained that even though the LTAC has accepted the patient they have to file for authorization from the patients insurance to provide payment we informed her that at this time we could not tell her how much the ambulance transport would because we are [facility] with [facility]  at her request we provided the patient sister with the name of the  accepting LTAC in [place] and we explained that [facility] would require full payment up front either in cash, credit card or cashiers check  Case management will continue to follow  1145 Case management  called by [facility] Giovana  for additional information on patients O2 requirements and drips so that they can see if they can transport the patient safely  Case management will continue to follow  1200 Case management called by [facility] to reiterate the amount of O2 needed and at his stated O2 needs (airvo 40l and 42%FiO2 ) He stated that this would require frequent stops to get more tanks requiring at least 10 stops   Case management will continue to follow

## 2024-07-25 NOTE — PT/OT/SLP PROGRESS
Occupational Therapy   Treatment    Name: Yong Mcintyre  MRN: [id number]  Admitting Diagnosis:  Right ventricular dysfunction  9 Days Post-Op    Recommendations:     Discharge Recommendations: Low Intensity Therapy  Discharge Equipment Recommendations:  none  Barriers to discharge:  None    Assessment:     Yong Mcintyre is a 48 y.o. male with a medical diagnosis of Right ventricular dysfunction. He presents with the following performance deficits affecting function are impaired endurance, impaired self care skills, impaired functional mobility, gait instability, impaired balance. Pt making progress with established OT goals. Pt currently requires SBA with standing ADLs and SBA for transfers. VSS throughout tx session. Pt would benefit from continued skilled acute OT services in order to maximize IND with ADLs and functional mobility to ensure safe return to PLOF in the least restrictive environment. OT continuing to recommend low intensity therapy once pt is medically appropriate for d/c.    Rehab Prognosis: Good; patient would benefit from acute skilled OT services to address these deficits and reach maximum level of function.       Plan:     Patient to be seen 4 x/week to address the above listed problems via self-care/home management, therapeutic activities, therapeutic exercises, neuromuscular re-education  Plan of Care Expires: 08/21/24  Plan of Care Reviewed with: patient    Subjective     Chief Complaint: Pt agreeable to participate in therapy this AM.  Patient/Family Comments/goals: Return to PLOF.  Pain/Comfort:  Pain Rating 1: 0/10  Pain Rating Post-Intervention 1: 0/10    Objective:     Communicated with: RN prior to session. Patient found up in chair with telemetry, pulse ox (continuous), peripheral IV, PICC line, oxygen upon OT entry to room.    General Precautions: Standard, fall    Orthopedic Precautions:N/A  Braces: N/A  Respiratory Status: High flow, flow 40 L/min, concentration 40%. VSS  throughout tx session.     Occupational Performance:     Bed Mobility:    Not attempted 2/2 pt up in chair at start and end of tx session.     Functional Mobility/Transfers:  Patient completed 3 trials of Sit <> Stand Transfer with stand by assistance with no assistive device   Functional Mobility: Not attempted at this time 2/2 pt high flow must remain connected to wall.    Activities of Daily Living:  Grooming: stand by assistance to perform oral hygiene and wash face with washcloth in standing    [facility] 6 Click ADL: 21    Treatment & Education:  Pt educated on:   Role of OT, POC, and d/c planning.   Safe transfer techniques and proper body mechanics for fall prevention and improved independence with functional transfers.  Importance of OOB activities to increase endurance and tolerance for increased participation in daily ADLs.   Utilizing the call bell to request for assistance with all functional mobility to ensure safety during hospital stay.  Whiteboard updated.    Pt verbalized understanding and all questions were addressed within the scope of OT.     Patient left up in chair with all lines intact, call button in reach, and RN notified    GOALS:   Multidisciplinary Problems       Occupational Therapy Goals          Problem: Occupational Therapy    Goal Priority Disciplines Outcome Interventions   Occupational Therapy Goal     OT, PT/OT Progressing    Description: Goals to be met by: 8/21/24     Patient will increase functional independence with ADLs by performing:    LE Dressing with Modified [place].  Grooming while standing at sink with Modified [place].  Toileting from toilet/bedside commode with Modified [place] for hygiene and clothing management.   Step transfer with Modified [place]  Toilet transfer to toilet/bedside commode with Modified [place].                         Time Tracking:     OT Date of Treatment: 07/25/24  OT Start Time: 1116  OT Stop Time: 1127  OT Total Time  (min): 11 min    Billable Minutes:Therapeutic Activity 11    OT/MERA: OT     Number of MERA visits since last OT visit: 0    7/25/2024

## 2024-07-25 NOTE — CONSULTS
[facility] - Transplant Stepdown  Cardiology  Consult Note    Patient Name: Yong Mcintyre  MRN: [id number]  Admission Date: 7/4/2024  Hospital Length of Stay: 21 days  Code Status: Full Code   Attending Provider: Geeta Mendenhall MD   Consulting Provider: Melissa Shirley DO  Primary Care Physician: Gianni Escalona MD  Principal Problem:Right ventricular dysfunction    Patient information was obtained from patient, past medical records, and ER records.     Inpatient consult to Cardiology  Consult performed by: Melissa Shirley DO  Consult ordered by: Geeta Mendenhall MD        Subjective:     Chief Complaint:  Right sided heart failure     HPI:   Yong Mcintyre is a 48 y.o. male with severe pulmonary HTN (Group 2-3, on 3L home O2, diagnosed prior to 2015, follows with Dr. Child at [facility], MALLIKA, Obesity hypoventilation syndrome, HFpEF, Paroxysmal AFib (on Coumadin), BMI > 35, HTN, RA, COPD who presented with c/o worsening shortness of breath and lower extremity swelling 3 weeks ago and was admitted to the CCU for further management of right-sided heart failure. Palliative care was consulted given his poor prognosis. The patient had significant O2 requirements on admission which were slow to wean. A central line was placed for close CVP monitoring in the setting of aggressive diuresis. The patient has required intermittent bipap 2/2 hypercapnic respiratory failure and dobutamine was added to augment cardiac output and improve diuresis. CCU attempted to discontinue dobutamine but patient had to be put back on. While hospitalized, on 7/14, patient developed atrial flutter and was started on amiodarone. He had a cardioversion procedure on 7/16 and NSR was restored.     This is his 5th admission this year - palliative care has been consulted given his poor prognosis.  He reports compliance with his medications. His diuretic regimen was adjusted to the following; Torsemide 60 mg twice daily and metolazone 2.5 on  "M/WF/Sunday. He reports his dry weight is 223lbs and is currently weighing in at 245lbs. He uses 3L of O2 at home and is currently on high flow 40L at 70%. Pt's current discharge plan is to an LTAC in [place].     Cardiology was consulted for assistance with management of dobutamine and furosemide drips.    Past Medical History:   Diagnosis Date    Arthritis     CHF (congestive heart failure)     Diastolic dysfunction    Cor pulmonale 11/05/2012    Gallstones     GERD (gastroesophageal reflux disease)     Hypertension     Morbid obesity 11/05/2012    Obesity hypoventilation syndrome     On home oxygen therapy 03/07/2014    MALLIKA (obstructive sleep apnea)     BPAP 16/11 with 3 L at night (continuous O2).    Paroxysmal atrial fibrillation     PFO (patent foramen ovale) 11/05/2012    status post closure    Pickwickian syndrome 11/05/2012    Pulmonary hypertension        Past Surgical History:   Procedure Laterality Date    CHOLECYSTECTOMY      RIGHT HEART CATHETERIZATION Right 03/22/2022    Procedure: INSERTION, CATHETER, RIGHT HEART;  Surgeon: Tony Martínez MD;  Location: [facility] CATH LAB;  Service: Cardiology;  Laterality: Right;    RIGHT HEART CATHETERIZATION Right 01/31/2024    Procedure: INSERTION, CATHETER, RIGHT HEART;  Surgeon: Sheri Ritter MD;  Location: [facility] CATH LAB;  Service: Cardiology;  Laterality: Right;    UPPER GASTROINTESTINAL ENDOSCOPY      2-3 years ago       Review of patient's allergies indicates:   Allergen Reactions    Lipitor [atorvastatin] Other (See Comments)     "it makes my legs feel like jelly"  Other reaction(s): causes legs to hurt       No current facility-administered medications on file prior to encounter.     Current Outpatient Medications on File Prior to Encounter   Medication Sig    allopurinoL (ZYLOPRIM) 300 MG tablet Take 1 tablet (300 mg total) by mouth once daily.    pantoprazole (PROTONIX) 40 MG tablet Take 1 tablet (40 mg total) by mouth once daily.    tiotropium (SPIRIVA) 18 " mcg inhalation capsule Inhale 18 mcg into the lungs once daily.    WIXELA INHUB 250-50 mcg/dose diskus inhaler 1 puff 2 (two) times daily. USE 1 INHALATION BY MOUTH TWICE DAILY    [DISCONTINUED] sildenafil (REVATIO) 20 mg Tab Take 1 tablet (20 mg total) by mouth 3 (three) times daily.     Family History       Problem Relation (Age of Onset)    Arthritis Mother, Maternal Grandmother, Maternal Grandfather    Asthma Mother, Sister, Maternal Grandmother    Breast cancer Paternal Grandmother    Diabetes Maternal Grandmother    Down syndrome Brother    Gout Brother    Hypertension Father, Maternal Grandmother, Maternal Grandfather, Paternal Grandfather          Tobacco Use    Smoking status: Never    Smokeless tobacco: Never   Substance and Sexual Activity    Alcohol use: Yes     Comment: holidays  rare    Drug use: No    Sexual activity: Not Currently     Partners: Female     Review of Systems   Constitutional: Negative.   HENT: Negative.     Eyes: Negative.    Cardiovascular: Negative.    Respiratory: Negative.     Endocrine: Negative.    Skin: Negative.    Musculoskeletal: Negative.    Gastrointestinal: Negative.    Genitourinary: Negative.    Neurological: Negative.    Psychiatric/Behavioral: Negative.     Allergic/Immunologic: Negative.      Objective:     Vital Signs (Most Recent):  Temp: 98.1 °F (36.7 °C) (07/25/24 1609)  Pulse: 83 (07/25/24 1609)  Resp: 18 (07/25/24 1609)  BP: (!) 98/55 (07/25/24 1609)  SpO2: (!) 92 % (07/25/24 1609) Vital Signs (24h Range):  Temp:  [97.6 °F (36.4 °C)-98.3 °F (36.8 °C)] 98.1 °F (36.7 °C)  Pulse:  [81-95] 83  Resp:  [16-22] 18  SpO2:  [89 %-96 %] 92 %  BP: ()/(55-67) 98/55     Weight: 97.9 kg (215 lb 13.3 oz)  Body mass index is 37.05 kg/m².    SpO2: (!) 92 %         Intake/Output Summary (Last 24 hours) at 7/25/2024 6386  Last data filed at 7/25/2024 1506  Gross per 24 hour   Intake 479 ml   Output 2260 ml   Net -1781 ml       Lines/Drains/Airways       Peripherally Inserted  Central Catheter Line  Duration             PICC Double Lumen 07/22/24 1120 right basilic 3 days              Peripheral Intravenous Line  Duration                  Peripheral IV - Single Lumen 07/20/24 2000 18 G Anterior;Proximal;Right Forearm 4 days                     Physical Exam  Constitutional:       Comments: Chronically ill appearing.   HENT:      Head: Normocephalic and atraumatic.   Eyes:      Extraocular Movements: Extraocular movements intact.      Conjunctiva/sclera: Conjunctivae normal.   Neck:      Comments: JVD and HJR noted  Cardiovascular:      Rate and Rhythm: Normal rate and regular rhythm.      Heart sounds:      Gallop (S3 gallop) present.   Pulmonary:      Effort: Pulmonary effort is normal.      Breath sounds: Normal breath sounds.      Comments: On HFNC  Abdominal:      General: Abdomen is flat.      Palpations: Abdomen is soft.   Musculoskeletal:      Right lower leg: Edema present.      Left lower leg: Edema present.   Skin:     General: Skin is warm and dry.   Neurological:      General: No focal deficit present.      Mental Status: He is alert and oriented to person, place, and time.   Psychiatric:         Mood and Affect: Mood normal.         Behavior: Behavior normal.          Significant Labs: CMP   Recent Labs   Lab 07/24/24  0404 07/24/24  0753 07/25/24  0527 07/25/24  1514    139 141 138   K 3.4* 3.7 3.1* 3.5   CL 94* 95 96 96   CO2 31* 38* 37* 35*   * 125* 81 136*   BUN 54* 50* 47* 42*   CREATININE 2.3* 2.1* 2.0* 2.0*   CALCIUM 9.1 9.1 9.3 9.1   PROT 7.3  --  7.7  --    ALBUMIN 2.5*  --  2.7* 2.9*   BILITOT 0.8  --  1.0  --    ALKPHOS 127  --  134  --    AST 16  --  17  --    ALT 10  --  10  --    ANIONGAP 12 6* 8 7*   , CBC   Recent Labs   Lab 07/24/24  0404 07/25/24  0527   WBC 5.80 5.57   HGB 12.2* 12.3*   HCT 42.5 43.3    216       Significant Imaging: Echocardiogram: Transthoracic echo (TTE) complete ([facility] Only):   Results for orders placed or performed  "during the hospital encounter of 07/04/24   Echo   Result Value Ref Range    BSA 2.25 m2    LVOT stroke volume 43.90 cm3    LVIDd 5.12 3.5 - 6.0 cm    LV Systolic Volume 70.29 mL    LV Systolic Volume Index 32.7 mL/m2    LVIDs 4.01 (A) 2.1 - 4.0 cm    LV Diastolic Volume 124.80 mL    LV Diastolic Volume Index 58.05 mL/m2    IVS 0.46 (A) 0.6 - 1.1 cm    LVOT diameter 2.26 cm    LVOT area 4.0 cm2    FS 22 (A) 28 - 44 %    Left Ventricle Relative Wall Thickness 0.32 cm    Posterior Wall 0.81 0.6 - 1.1 cm    LV mass 106.01 g    LV Mass Index 49 g/m2    MV Peak E Rick 0.43 m/s    TDI LATERAL 0.14 m/s    TDI SEPTAL 0.08 m/s    E/E' ratio 3.91 m/s    MV Peak A Rick 0.81 m/s    TR Max Rick 4.06 m/s    E/A ratio 0.53     IVRT 54.23 msec    E wave deceleration time 125.22 msec    MV "A" wave duration 9.13 msec    LV SEPTAL E/E' RATIO 5.38 m/s    LA Volume Index 17.9 mL/m2    LV LATERAL E/E' RATIO 3.07 m/s    LA volume 38.57 cm3    PV Peak S Rick 0.25 m/s    PV Peak D Rick 0.36 m/s    Pulm vein S/D ratio 0.69     LVOT peak rick 0.67 m/s    RV- oneill basal diam 6.4 cm    TAPSE 1.77 cm    LA size 2.54 cm    Left Atrium Minor Axis 5.67 cm    Left Atrium Major Axis 5.71 cm    LA volume (mod) 22.75 cm3    LA WIDTH 3.14 cm    LA Volume Index (Mod) 10.6 mL/m2    RA Major Axis 6.05 cm    RA Width 6.10 cm    AV mean gradient 2 mmHg    AV peak gradient 3 mmHg    Ao peak rick 0.92 m/s    Ao VTI 15.35 cm    LVOT peak VTI 10.95 cm    AV valve area 2.86 cm²    AV Velocity Ratio 0.73     AV index (prosthetic) 0.71     RITIKA by Velocity Ratio 2.92 cm²    MV stenosis pressure 1/2 time 36.31 ms    MV valve area p 1/2 method 6.06 cm2    Triscuspid Valve Regurgitation Peak Gradient 66 mmHg    Sinus 2.77 cm    STJ 2.36 cm    Ascending aorta 2.51 cm    Mean e' 0.11 m/s    ZLVIDS -0.46     ZLVIDD -3.10     TV resting pulmonary artery pressure 81 mmHg    RV TB RVSP 19 mmHg    Est. RA pres 15 mmHg    RA area length vol 119.0 mL    RA area 31.0 cm2    Narrative "      Left Ventricle: Mild global hypokinesis present. Septal flattening in   diastole and systole consistent with right ventricular volume and pressure   overload. There is moderately reduced systolic function with a visually   estimated ejection fraction of 35 - 40%. Grade I diastolic dysfunction.   Eatimated CO is 3.4 l/min    Right Ventricle: Severe right ventricular enlargement. Wall thickness   is normal. Right ventricle wall motion has global hypokinesis. Systolic   function is severely reduced.    Right Atrium: Right atrium is severely dilated.    Aortic Valve: The aortic valve is a trileaflet valve. There is mild   aortic valve sclerosis. There is moderate annular calcification present.    Tricuspid Valve: There is moderate regurgitation with an anteromedial   eccentrically directed jet.    Aorta: Aortic root is normal in size measuring 2.77 cm. Ascending aorta   is normal measuring 2.51 cm.    Pulmonary Artery: There is severe pulmonary hypertension. The estimated   pulmonary artery systolic pressure is 81 mmHg.    IVC/SVC: Elevated venous pressure at 15 mmHg.       Assessment and Plan:   Mr. Mcintyre is a 48 year old male with severe pulmonary HTN (Group 2-3, on 3L home O2, diagnosed prior to 2015, follows with Dr. Child at [facility], MALLIKA, Obesity hypoventilation syndrome, HFpEF, Paroxysmal AFib (on Coumadin), BMI > 35, HTN, RA, COPD who presented with c/o worsening shortness of breath and lower extremity swelling 3 weeks ago. Pt is awaiting transfer to an LTAC in [place]. Cardiology was consulted for optimization of palliative dobutamine and furosemide drips.      *Acute on chronic right-sided congestive heart failure  -Continue dobutamine gtt   -Continue lasix gtt   -We will continue to monitor      VTE Risk Mitigation (From admission, onward)           Ordered     warfarin (COUMADIN) tablet 5 mg  Once per day on Sunday Monday Tuesday Wednesday Friday Saturday 07/24/24 1125     IP VTE HIGH RISK  PATIENT  Once         07/04/24 2302     Place sequential compression device  Until discontinued         07/04/24 2302                    Thank you for your consult. I will follow-up with patient. Please contact us if you have any additional questions.    Melissa Shirley, DO  Cardiology   [facility] - Transplant Stepdown

## 2024-07-25 NOTE — RESPIRATORY THERAPY
RAPID RESPONSE RESPIRATORY THERAPY PROACTIVE ROUNDING NOTE             Time of visit: 0843     Code Status: Full Code   : 1975  Bed: 65256/52267 A:   MRN: [id number]  Time spent at the bedside: < 15 min    SITUATION    Evaluated patient for: HFNC Compliance     BACKGROUND    Patient has a past medical history of Arthritis, CHF (congestive heart failure), Cor pulmonale, Gallstones, GERD (gastroesophageal reflux disease), Hypertension, Morbid obesity, Obesity hypoventilation syndrome, On home oxygen therapy, MALLIKA (obstructive sleep apnea), Paroxysmal atrial fibrillation, PFO (patent foramen ovale), Pickwickian syndrome, and Pulmonary hypertension.    24 Hours Vitals Range:  Temp:  [97.6 °F (36.4 °C)-98.6 °F (37 °C)]   Pulse:  [81-91]   Resp:  [16-30]   BP: ()/(50-67)   SpO2:  [89 %-96 %]     Labs:    Recent Labs     24  0319 24  0955 24  0404 24  0753 24  0527      < > 137 139 141   K 3.3*   < > 3.4* 3.7 3.1*   CL 92*   < > 94* 95 96   CO2 36*   < > 31* 38* 37*   BUN 50*   < > 54* 50* 47*   CREATININE 2.1*   < > 2.3* 2.1* 2.0*   GLU 92   < > 140* 125* 81   PHOS 4.1  --  3.8  --  3.7   MG 2.1  --  1.8  --  1.9    < > = values in this interval not displayed.        Recent Labs     24  0744 24  1933   PH 7.355 7.339* 7.367   PCO2 68.2* 74.6* 68.4*   PO2 26* 35* 31*   HCO3 38.1* 40.1* 39.2*   POCSATURATED 43 61 53   BE 13* 14* 14*       ASSESSMENT/INTERVENTIONS    Pt still on bipap from overnight, still resting.   While awake pt is on Airvo 40L 40%    Last VS   Temp: 98 °F (36.7 °C) (844)  Pulse: 82 (844)  Resp: 18 (844)  BP: 111/62 (844)  SpO2: 94 % (844)    Level of Consciousness: Level of Consciousness (AVPU): alert  Respiratory Effort: Respiratory Effort: Normal, Unlabored Expansion/Accessory Muscle Usage: Expansion/Accessory Muscles/Retractions: no use of accessory muscles  All Lung Field Breath Sounds: All  Lung Fields Breath Sounds: Anterior:, diminished  ISRAEL Breath Sounds: Anterior:, Posterior:, coarse  LLL Breath Sounds: Anterior:, Posterior:, coarse  RUL Breath Sounds: Anterior:, Posterior:, coarse  RML Breath Sounds: Anterior:, Posterior:, coarse  RLL Breath Sounds: Anterior:, Posterior:, coarse  O2 Device/Concentration: bipap resting  Was the O2 device able to be weaned? No  Ambu at bedside:      Active Orders   Respiratory Care    Bipap Nighttime and Daytime Nap Use     Frequency: Nighttime and Daytime Nap Use     Number of Occurrences: Until Specified     Order Questions:      Mode BIPAP      FiO2% 40      Inspiratory pressure: 15      Expiratory pressure: 5    Oxygen Continuous     Frequency: Continuous     Number of Occurrences: Until Specified     Order Questions:      Device type: High flow      Device: Comfort Flow      FiO2%: 50      LPM: 40      Titrate O2 per Oxygen Titration Protocol: Yes      To maintain SpO2 goal of: >= 90%      Notify MD of: Inability to achieve desired SpO2; Sudden change in patient status and requires 20% increase in FiO2; Patient requires >60% FiO2    Pulse Oximetry Q4H     Frequency: Q4H     Number of Occurrences: Until Specified       RECOMMENDATIONS    We recommend: RRT Recs: Continue POC per primary team.      FOLLOW-UP    Please call back the Rapid Response RT, Soniya Corrigan, RRT at x [phone] for any questions or concerns.

## 2024-07-25 NOTE — PLAN OF CARE
9:17 AM   The CM team contacted [employer] to follow-up regarding the patient's d/c transportation. The representative with [employer] reported that she was unsure if this company is able to accommodate this patient's O2 requirement. The representative took the SW's name and contact information. The SW is awaiting a call back.     3:57 PM  The SW received a phone call from Nicolas Valenzuela [phone]   with [employer]. Per Nicolas, the patient's flight would be 24,300 which does not include a ambulance ride to and from the plane. Per Nicolas, the patient's ambulance ride would be 7,600 to [place], [place]. The SW notified the patient's care team by secure chat about the above information.     4:08 PM  The SW contacted the patient's sister and provided a update regarding the cost of transportation via flight and ambulance ground route.          Elijah Granados LCSW  Case Management [facility]

## 2024-07-25 NOTE — PLAN OF CARE
Problem: Occupational Therapy  Goal: Occupational Therapy Goal  Description: Goals to be met by: 8/21/24     Patient will increase functional independence with ADLs by performing:    LE Dressing with Modified [place].  Grooming while standing at sink with Modified [place].  Toileting from toilet/bedside commode with Modified [place] for hygiene and clothing management.   Step transfer with Modified [place]  Toilet transfer to toilet/bedside commode with Modified [place].    Outcome: Progressing

## 2024-07-25 NOTE — ASSESSMENT & PLAN NOTE
"Pt is a 47 y/o male with severe RV failure 2/2 Pulmonary HTN.  Patient admitted almost 3 weeks ago for diuresis but has had worsening respiratory failure requiring escalating oxygen requirements during his hospital stay    Pulm HTN/ILD/paroxysmal a-fib/atrial flutter/acute on chronic right sides heart failure/acute hypoxic resp failure  -management per primary team and other consultants     Code status: Full     Surrogate decision maker: Has HCPOA document naming father.  Pt would want his sister Paula to be his surrogate decision maker.  Patient confirmed again today that he would like to go live with his sister in [place] and that she is planning on taking care of him.    GOC/ACP  7/25/24  I discussed the patient's discharge plans with him, in particular LTAC acceptance in the [place] area.  While patient said that he is agreeable to the plan for transfer there, he expressed a lot of frustration and disappointment in this plan as he really felt like he was going to be well enough after this hospitalization to be discharged directly home in [place], be able to take care of his affairs including his home and drive his brown new truck to [place] himself.  He said repeatedly that "this is not the way I wanted to do it" but also said he is willing to go along with this plan as he feels like his sister's ultimately in charge and he does not want to challenge her.  7/23/24  We met with the patient, Dr. Patel and Dr. Leary from the CCU team and the patient's sister, Paula, via phone  We updated the patient and the sister on current challenges the patient is facing including most importantly his high oxygen requirement.  Sister emphasized repeatedly the importance of moving the patient to [place] as he would have considerably more support in that city than he does in [place].  We shared are worried that if the patient's oxygen requirement does not decreased, there may not be a feasible way to transport the " patient.  We discussed different oxygen delivery devices and explained the limitations of them.  Sister requesting patient be transferred to a hospital in the [facility].  Dr. Leary shared her worries that the patient would not meet criteria for transfer to a new hospital system but assured the patient and his sister that she would make a referral to the transfer center.  We tried to discuss disposition options for the patient should the transfer request fail, sister and patient not really open to discussing alternative plans.  7/22/24  Patient feeling very hopeful about the future as he has noted improvements in his strength and function over the past couple of days, stating he was able to walk around for the 1st time.  Also he has had a decrease in his oxygen requirement from 60 to 40 L. he is insistent that he will leave the hospital, make it to [place] where he plans to live with his family.  He reports feeling like he has in a penitentiary here and states that he is eager to be discharged but does not want to be discharged until he is medically ready.  He does not feel like he is at the end of his life.  He is still hopeful to returned to working out at the gym, driving his new car.  When asked where he would like to be at the very end of his life, patient states that he would prefer to be in a hospital setting as he feels like he had get the best care there.  In terms of what kind of treatment he would want to the end of his life, he does not want to make any of those decisions right now and would want to defer to his family and their best judgment when that time comes.  7/19/24  -Met with patient at bedside. Introduced palliative medicine. He did not remember prior conversations with [facility]. Patient was very surprised to hear he was on 60L. After much explaining and reinforcing he was able to understand this is much more than his home 3-4L . He said he wished he would have known how bad things were. Said he  was expecting to be discharged next week to drive his new car. His plan was to move to [place] to be closer to family. Explained that unfortunately the amount of O2 he is on can only be done in the hospital. Began discussion of options moving forward - continuing what we are doing here in the hospital until he no longer finds it acceptable or decompensates further. Other option would be weaning it off with comfort meds in which he is likely to die here (did not go into detail about this). Pt says he feels like his parents would want to keep him alive forever even if on machines. When asked how he feels about this he said he is just hoping this doesn't actually happen.    Prognosis:  While I do believe that patient is on a dying trajectory, as supported by his frequent hospitalizations and decline in his overall function, it does seem possible that he will recover from this acute episode.      Summary/recommendations:  -Goals: patient's ultimate goal is to improve to the point of being independent, mobile, driving his truck, going to the gym.  He also wants to get to [place] to live with his family.  At this point in time, he does not seem capable of imagining a future in which he does not recover.   -Code status:  Full  -a LTACH in the [place] area has accepted the patient for admission.  It is not clear whether patient's insurance will cover a medical transport to the facility however patient and his sister both stating that they have enough money to find a medical transport.  While the patient was hopeful to recover to the point of being able to be discharged from this hospital to home in [place], I do think that this is the best case scenario for him and that we should move forward with this plan to get him transferred to the LTAC and [place].  Patient is agreeable.

## 2024-07-26 PROBLEM — J96.12 ACUTE HYPOXIC ON CHRONIC HYPERCAPNIC RESPIRATORY FAILURE: Status: ACTIVE | Noted: 2024-07-13

## 2024-07-26 PROBLEM — N17.9 ACUTE RENAL FAILURE SUPERIMPOSED ON STAGE 3 CHRONIC KIDNEY DISEASE: Status: ACTIVE | Noted: 2023-12-11

## 2024-07-26 PROBLEM — J98.4 CHRONIC LUNG DISEASE: Status: ACTIVE | Noted: 2024-07-26

## 2024-07-26 PROBLEM — J96.01 ACUTE HYPOXIC ON CHRONIC HYPERCAPNIC RESPIRATORY FAILURE: Status: ACTIVE | Noted: 2024-07-13

## 2024-07-26 PROBLEM — I50.813 ACUTE ON CHRONIC RIGHT-SIDED HEART FAILURE: Status: ACTIVE | Noted: 2024-02-12

## 2024-07-26 LAB
ALBUMIN SERPL BCP-MCNC: 2.6 G/DL (ref 3.5–5.2)
ALBUMIN SERPL BCP-MCNC: 3 G/DL (ref 3.5–5.2)
ALP SERPL-CCNC: 131 U/L (ref 55–135)
ALT SERPL W/O P-5'-P-CCNC: 11 U/L (ref 10–44)
ANION GAP SERPL CALC-SCNC: 10 MMOL/L (ref 8–16)
ANION GAP SERPL CALC-SCNC: 5 MMOL/L (ref 8–16)
AST SERPL-CCNC: 19 U/L (ref 10–40)
BASOPHILS # BLD AUTO: 0.04 K/UL (ref 0–0.2)
BASOPHILS NFR BLD: 0.7 % (ref 0–1.9)
BILIRUB SERPL-MCNC: 1 MG/DL (ref 0.1–1)
BUN SERPL-MCNC: 41 MG/DL (ref 6–20)
BUN SERPL-MCNC: 44 MG/DL (ref 6–20)
CALCIUM SERPL-MCNC: 9 MG/DL (ref 8.7–10.5)
CALCIUM SERPL-MCNC: 9.4 MG/DL (ref 8.7–10.5)
CHLORIDE SERPL-SCNC: 97 MMOL/L (ref 95–110)
CHLORIDE SERPL-SCNC: 98 MMOL/L (ref 95–110)
CO2 SERPL-SCNC: 34 MMOL/L (ref 23–29)
CO2 SERPL-SCNC: 34 MMOL/L (ref 23–29)
CREAT SERPL-MCNC: 1.7 MG/DL (ref 0.5–1.4)
CREAT SERPL-MCNC: 1.8 MG/DL (ref 0.5–1.4)
DIFFERENTIAL METHOD BLD: ABNORMAL
EOSINOPHIL # BLD AUTO: 0.2 K/UL (ref 0–0.5)
EOSINOPHIL NFR BLD: 3.4 % (ref 0–8)
ERYTHROCYTE [DISTWIDTH] IN BLOOD BY AUTOMATED COUNT: 22.5 % (ref 11.5–14.5)
EST. GFR  (NO RACE VARIABLE): 45.9 ML/MIN/1.73 M^2
EST. GFR  (NO RACE VARIABLE): 49.1 ML/MIN/1.73 M^2
GLUCOSE SERPL-MCNC: 113 MG/DL (ref 70–110)
GLUCOSE SERPL-MCNC: 78 MG/DL (ref 70–110)
HCT VFR BLD AUTO: 44.1 % (ref 40–54)
HGB BLD-MCNC: 12 G/DL (ref 14–18)
IMM GRANULOCYTES # BLD AUTO: 0.01 K/UL (ref 0–0.04)
IMM GRANULOCYTES NFR BLD AUTO: 0.2 % (ref 0–0.5)
INR PPP: 1.6 (ref 0.8–1.2)
LYMPHOCYTES # BLD AUTO: 1.3 K/UL (ref 1–4.8)
LYMPHOCYTES NFR BLD: 22.2 % (ref 18–48)
MAGNESIUM SERPL-MCNC: 1.8 MG/DL (ref 1.6–2.6)
MCH RBC QN AUTO: 24.3 PG (ref 27–31)
MCHC RBC AUTO-ENTMCNC: 27.2 G/DL (ref 32–36)
MCV RBC AUTO: 89 FL (ref 82–98)
MONOCYTES # BLD AUTO: 0.5 K/UL (ref 0.3–1)
MONOCYTES NFR BLD: 8.4 % (ref 4–15)
NEUTROPHILS # BLD AUTO: 3.7 K/UL (ref 1.8–7.7)
NEUTROPHILS NFR BLD: 65.1 % (ref 38–73)
NRBC BLD-RTO: 0 /100 WBC
PHOSPHATE SERPL-MCNC: 2.7 MG/DL (ref 2.7–4.5)
PHOSPHATE SERPL-MCNC: 3.6 MG/DL (ref 2.7–4.5)
PLATELET # BLD AUTO: 236 K/UL (ref 150–450)
PMV BLD AUTO: 10.7 FL (ref 9.2–12.9)
POTASSIUM SERPL-SCNC: 3.2 MMOL/L (ref 3.5–5.1)
POTASSIUM SERPL-SCNC: 3.6 MMOL/L (ref 3.5–5.1)
PROT SERPL-MCNC: 7.4 G/DL (ref 6–8.4)
PROTHROMBIN TIME: 17.4 SEC (ref 9–12.5)
RBC # BLD AUTO: 4.94 M/UL (ref 4.6–6.2)
SODIUM SERPL-SCNC: 137 MMOL/L (ref 136–145)
SODIUM SERPL-SCNC: 141 MMOL/L (ref 136–145)
WBC # BLD AUTO: 5.62 K/UL (ref 3.9–12.7)

## 2024-07-26 PROCEDURE — 80069 RENAL FUNCTION PANEL: CPT | Performed by: STUDENT IN AN ORGANIZED HEALTH CARE EDUCATION/TRAINING PROGRAM

## 2024-07-26 PROCEDURE — 94660 CPAP INITIATION&MGMT: CPT

## 2024-07-26 PROCEDURE — 99900035 HC TECH TIME PER 15 MIN (STAT)

## 2024-07-26 PROCEDURE — 97530 THERAPEUTIC ACTIVITIES: CPT | Mod: CO

## 2024-07-26 PROCEDURE — 20600001 HC STEP DOWN PRIVATE ROOM

## 2024-07-26 PROCEDURE — 25000003 PHARM REV CODE 250

## 2024-07-26 PROCEDURE — 63600175 PHARM REV CODE 636 W HCPCS: Performed by: STUDENT IN AN ORGANIZED HEALTH CARE EDUCATION/TRAINING PROGRAM

## 2024-07-26 PROCEDURE — 94799 UNLISTED PULMONARY SVC/PX: CPT

## 2024-07-26 PROCEDURE — 83735 ASSAY OF MAGNESIUM: CPT

## 2024-07-26 PROCEDURE — 25000003 PHARM REV CODE 250: Performed by: STUDENT IN AN ORGANIZED HEALTH CARE EDUCATION/TRAINING PROGRAM

## 2024-07-26 PROCEDURE — 27100171 HC OXYGEN HIGH FLOW UP TO 24 HOURS

## 2024-07-26 PROCEDURE — 80053 COMPREHEN METABOLIC PANEL: CPT

## 2024-07-26 PROCEDURE — 94761 N-INVAS EAR/PLS OXIMETRY MLT: CPT

## 2024-07-26 PROCEDURE — 85610 PROTHROMBIN TIME: CPT

## 2024-07-26 PROCEDURE — 25000003 PHARM REV CODE 250: Performed by: INTERNAL MEDICINE

## 2024-07-26 PROCEDURE — 63600175 PHARM REV CODE 636 W HCPCS

## 2024-07-26 PROCEDURE — A4216 STERILE WATER/SALINE, 10 ML: HCPCS | Performed by: INTERNAL MEDICINE

## 2024-07-26 PROCEDURE — 85025 COMPLETE CBC W/AUTO DIFF WBC: CPT

## 2024-07-26 PROCEDURE — 84100 ASSAY OF PHOSPHORUS: CPT

## 2024-07-26 PROCEDURE — 94640 AIRWAY INHALATION TREATMENT: CPT

## 2024-07-26 RX ORDER — ALBUTEROL SULFATE 90 UG/1
2 AEROSOL, METERED RESPIRATORY (INHALATION) 4 TIMES DAILY
Status: DISCONTINUED | OUTPATIENT
Start: 2024-07-27 | End: 2024-07-29 | Stop reason: HOSPADM

## 2024-07-26 RX ORDER — POTASSIUM CHLORIDE 20 MEQ/1
40 TABLET, EXTENDED RELEASE ORAL ONCE
Status: COMPLETED | OUTPATIENT
Start: 2024-07-26 | End: 2024-07-26

## 2024-07-26 RX ORDER — ACETAZOLAMIDE 250 MG/1
250 TABLET ORAL 2 TIMES DAILY
Status: DISCONTINUED | OUTPATIENT
Start: 2024-07-26 | End: 2024-07-29 | Stop reason: HOSPADM

## 2024-07-26 RX ORDER — POTASSIUM CHLORIDE 20 MEQ/1
TABLET, EXTENDED RELEASE ORAL
Status: DISPENSED
Start: 2024-07-26 | End: 2024-07-26

## 2024-07-26 RX ADMIN — ALBUTEROL SULFATE 2 PUFF: 108 INHALANT RESPIRATORY (INHALATION) at 08:07

## 2024-07-26 RX ADMIN — FUROSEMIDE 40 MG/HR: 10 INJECTION, SOLUTION INTRAMUSCULAR; INTRAVENOUS at 05:07

## 2024-07-26 RX ADMIN — Medication 10 ML: at 12:07

## 2024-07-26 RX ADMIN — FLUTICASONE PROPIONATE AND SALMETEROL 1 PUFF: 50; 250 POWDER RESPIRATORY (INHALATION) at 09:07

## 2024-07-26 RX ADMIN — FLUTICASONE PROPIONATE AND SALMETEROL 1 PUFF: 50; 250 POWDER RESPIRATORY (INHALATION) at 08:07

## 2024-07-26 RX ADMIN — ALBUTEROL SULFATE 2 PUFF: 108 INHALANT RESPIRATORY (INHALATION) at 02:07

## 2024-07-26 RX ADMIN — DOBUTAMINE HYDROCHLORIDE 5 MCG/KG/MIN: 400 INJECTION INTRAVENOUS at 06:07

## 2024-07-26 RX ADMIN — Medication 10 ML: at 04:07

## 2024-07-26 RX ADMIN — FUROSEMIDE 40 MG/HR: 10 INJECTION, SOLUTION INTRAMUSCULAR; INTRAVENOUS at 08:07

## 2024-07-26 RX ADMIN — POTASSIUM CHLORIDE 40 MEQ: 1500 TABLET, EXTENDED RELEASE ORAL at 10:07

## 2024-07-26 RX ADMIN — ALBUTEROL SULFATE 2 PUFF: 108 INHALANT RESPIRATORY (INHALATION) at 01:07

## 2024-07-26 RX ADMIN — PANTOPRAZOLE SODIUM 40 MG: 40 TABLET, DELAYED RELEASE ORAL at 08:07

## 2024-07-26 RX ADMIN — ALLOPURINOL 300 MG: 100 TABLET ORAL at 08:07

## 2024-07-26 RX ADMIN — QUETIAPINE FUMARATE 50 MG: 25 TABLET ORAL at 10:07

## 2024-07-26 RX ADMIN — SENNOSIDES AND DOCUSATE SODIUM 1 TABLET: 50; 8.6 TABLET ORAL at 08:07

## 2024-07-26 RX ADMIN — AMIODARONE HYDROCHLORIDE 400 MG: 200 TABLET ORAL at 08:07

## 2024-07-26 RX ADMIN — WARFARIN SODIUM 5 MG: 5 TABLET ORAL at 04:07

## 2024-07-26 RX ADMIN — ACETAZOLAMIDE 250 MG: 250 TABLET ORAL at 10:07

## 2024-07-26 NOTE — PROGRESS NOTES
[facility] Medicine  Progress note    Team: [facility] Geeta Mendenhall MD  Admit Date: 7/4/2024  Code status: Full Code    Principal Problem:  Right ventricular dysfunction    Interval hx:  No new complaints or events    PEx  Temp:  [97.9 °F (36.6 °C)-98.4 °F (36.9 °C)]   Pulse:  []   Resp:  [16-87]   BP: ()/(51-62)   SpO2:  [90 %-94 %]     Intake/Output Summary (Last 24 hours) at 7/26/2024 1854  Last data filed at 7/26/2024 1725  Gross per 24 hour   Intake 560 ml   Output 920 ml   Net -360 ml     General Appearance: no acute distress, WD, ill-appearing  Heart: regular rate and rhythm, no heave  Respiratory: Normal respiratory effort, symmetric excursion, bilateral vesicular breath sounds   Abdomen: Soft, non-tender; bowel sounds active  Skin: intact, no rash, no ulcers  Neurologic:  No focal numbness or weakness  Mental status: Alert, oriented x 4, affect appropriate    Recent Labs   Lab 07/24/24 0404 07/25/24 0527 07/26/24 0449   WBC 5.80 5.57 5.62   HGB 12.2* 12.3* 12.0*   HCT 42.5 43.3 44.1    216 236     Recent Labs   Lab 07/24/24 0404 07/24/24 0753 07/25/24 0527 07/25/24 1514 07/26/24 0449      < > 141 138 141   K 3.4*   < > 3.1* 3.5 3.2*   CL 94*   < > 96 96 97   CO2 31*   < > 37* 35* 34*   BUN 54*   < > 47* 42* 44*   CREATININE 2.3*   < > 2.0* 2.0* 1.7*   *   < > 81 136* 78   CALCIUM 9.1   < > 9.3 9.1 9.0   MG 1.8  --  1.9  --  1.8   PHOS 3.8  --  3.7 2.7 3.6    < > = values in this interval not displayed.     Recent Labs   Lab 07/24/24 0404 07/25/24 0527 07/25/24 1514 07/26/24 0449   ALKPHOS 127 134  --  131   ALT 10 10  --  11   AST 16 17  --  19   ALBUMIN 2.5* 2.7* 2.9* 2.6*   PROT 7.3 7.7  --  7.4   BILITOT 0.8 1.0  --  1.0   INR 2.1* 1.9*  --  1.6*        Recent Labs   Lab 07/22/24  0740 07/22/24  1353   POCTGLUCOSE 79 106       Scheduled Meds:   acetaZOLAMIDE  250 mg Oral BID    albuterol  2 puff Inhalation Q6H    allopurinoL  300 mg Oral Daily    [START ON  7/27/2024] amiodarone  200 mg Oral Daily    fluticasone-salmeterol 250-50 mcg/dose  1 puff Inhalation BID    pantoprazole  40 mg Oral Daily    potassium chloride SA        QUEtiapine  50 mg Oral QHS    senna-docusate 8.6-50 mg  1 tablet Oral Daily    sodium chloride 0.9%  10 mL Intravenous Q6H    warfarin  5 mg Oral Once per day on Sunday Monday Tuesday Wednesday Friday Saturday     Continuous Infusions:   DOBUTamine IV infusion (non-titrating)  5 mcg/kg/min Intravenous Continuous 7.8 mL/hr at 07/26/24 0642 5 mcg/kg/min at 07/26/24 0642    furosemide (Lasix) 500 mg in 50 mL infusion (conc: 10 mg/mL)  40 mg/hr Intravenous Continuous 4 mL/hr at 07/26/24 1723 40 mg/hr at 07/26/24 1723     As Needed:    Current Facility-Administered Medications:     0.9% NaCl, , Intravenous, PRN    0.9% NaCl, , Intravenous, PRN    acetaminophen, 650 mg, Oral, Q6H PRN    albuterol, 2 puff, Inhalation, Q4H PRN    aluminum & magnesium hydroxide-simethicone, 30 mL, Oral, Q6H PRN    glycerin adult, 1 suppository, Rectal, Daily PRN    melatonin, 6 mg, Oral, Nightly PRN    potassium chloride SA, , ,     sodium chloride 0.9%, 10 mL, Intravenous, PRN    Flushing PICC/Midline Protocol, , , Until Discontinued **AND** sodium chloride 0.9%, 10 mL, Intravenous, Q6H **AND** sodium chloride 0.9%, 10 mL, Intravenous, PRN    Assessment and Plan  / Problems managed today    * Acute on chronic right-sided heart failure  Acute on chronic diastolic heart function  Severe pulmonary HTN (group 2 and/or 3)  Acute on chronic. - NYHA class III symptoms with recurrent admissions. Admitted to CICU due to high oxygen needs. He required FiO2 70% 40 L. He was placed on dobutamine and lasix drips. He also was given intermittent acetazolamide and diuril. 13 kg weight loss with diuresis. His oxygen needs are very slow to improve. He at this time requires FiO2 40% 40 L. Patient does not have further medical options and is not heart-lung transplant candidate. He does not  wish to pursue palliative care. He wishes to continue medical management with anticipation of being able to be discharged home. He is not able to be weaned off dobutamine drip. Cardiology consulted when he stepped down to floor.     ECHO here showed   Left Ventricle: Mild global hypokinesis present. Septal flattening in diastole and systole consistent with right ventricular volume and pressure overload. There is moderately reduced systolic function with a visually estimated ejection fraction of 35 - 40%. Grade I diastolic dysfunction; Right Ventricle: Severe right ventricular enlargement. Wall thickness is normal. Right ventricle wall motion has global hypokinesis. Systolic function is severely reduced; Right Atrium: Right atrium is severely dilated; Aortic Valve: The aortic valve is a trileaflet valve. There is mild aortic valve sclerosis. There is moderate annular calcification present; Tricuspid Valve: There is moderate regurgitation with an anteromedial eccentrically directed jet; Pulmonary Artery: There is severe pulmonary hypertension. The estimated pulmonary artery systolic pressure is 81 mmHg.;  IVC/SVC: Elevated venous pressure at 15 mmHg     - 2 gram sodium restriction and 1500cc fluid restriction.  - Encourage physical activity with graded exercise program.  - Acetazolamide PO and diuril as needed for signs of contraction alkalosis   - lasix gtt and dobutamine gtt    Chronic lung disease  Rheumatology consulted for evaluation of connective tissue related lung disease. There is a question that the persisting ground glass opacities represent interstitial lung disease rather than just edema. Thus far, patient has no clinical picture that could represent connective tissue disease. Thus far serologies are unremarkable although some panels are still pending. They recommend follow up in the advance lung clinic.     Acute hypoxic on chronic hypercapnic respiratory failure  Obstructive sleep apnea  Hypoventilation  "syndrome  High oxygen flow needs  Continue diuresis  BIPAP 15/5 40% qhs and HFNC during day while awake  CT lung showed, "Similar appearance of diffuse ground-glass opacity, suggestive of pulmonary edema. Dilatation of the main pulmonary artery compatible with pulmonary hypertension. Cardiomegaly."   Pulmonary saw patient during stay and recommended rheumatology evaluation for connective tissue related lung disease  Albuterol 2 puff QID  Breo one puff daily    Atypical atrial flutter  Paroxysmal atrial fibrillation  consulted EP, s/p DCCV/DAWIT on 7/16  Given amiodarone loaded - now on amiodarone 200 mg daily  Continue warfarin for INR 2-3    Metabolic alkalosis  Due to aggressive diuresis  Acetazolamide prn     Acute renal failure superimposed on stage 3 chronic kidney disease  cardiorenal  Baseline 1.8-2.5  Admitted with SCr 3.1  Improved with diuresis    Palliative care encounter  Acute care planning  - patient not amenable to palliative options   - plan to transfer patient to LTAC at [place]    Diet:  low sodium  GI PPx: not needed  DVT PPx:  warfarin  Airways: room air  Wounds: none    Goals of Care:  Return to prior functional status     Discharge Planning   ESTEBAN: 7/27/2024   Is the patient medically ready for discharge?:     Reason for patient still in hospital (select all that apply): Patient trending condition and Treatment  Discharge Plan A: Long-term acute care facility (LTAC)        Geeta Mendenhall MD         "

## 2024-07-26 NOTE — PT/OT/SLP PROGRESS
Occupational Therapy   Treatment    Name: Yong Mcintyre  MRN: [id number]  Admitting Diagnosis:  Right ventricular dysfunction  10 Days Post-Op    Recommendations:     Discharge Recommendations: Low Intensity Therapy  Discharge Equipment Recommendations:  none  Barriers to discharge:  None    Assessment:     Yong Mcintyre is a 48 y.o. male with a medical diagnosis of Right ventricular dysfunction.  He presents with the fallowing performance deficits affecting function are weakness, impaired endurance, impaired self care skills, impaired functional mobility, gait instability, impaired balance, impaired cardiopulmonary response to activity, pain, decreased safety awareness, decreased lower extremity function, decreased upper extremity function. Patient agreeable to tx session, patient is demonstrating progress with functional transfers, bed mobility, and self-care task, however; patient continues to have limited activity tolerance due to pain and weakness from recent surgical procedure. Patient would benefit from Low intensity therapy intervention to address over all functional decline with mobility task, endurance, and ADLs in order to return to PLOF.     Rehab Prognosis:  Good; patient would benefit from acute skilled OT services to address these deficits and reach maximum level of function.       Plan:     Patient to be seen 4 x/week to address the above listed problems via self-care/home management, therapeutic activities, therapeutic exercises, neuromuscular re-education  Plan of Care Expires: 08/21/24  Plan of Care Reviewed with: patient    Subjective     Chief Complaint: non verbalized  Patient/Family Comments/goals: to return to PLOF  Pain/Comfort:  Pain Rating 1: 0/10    Objective:     Communicated with: Nurse prior to session.  Patient found up in chair with pulse ox (continuous), telemetry, peripheral IV, PICC line, oxygen upon OT entry to room.  A client care conference was completed by the OTR and the  CORTEZ prior to treatment by the CORTEZ to discuss the patient's POC and current status.   General Precautions: Standard, fall    Orthopedic Precautions:N/A  Braces: N/A  Respiratory Status: High flow,      Occupational Performance:     Functional Mobility/Transfers:  Patient completed Sit <> Stand Transfer from bedside chair with stand by assistance  with  no assistive device and hand-held assist   Patient was able to stand for 13 mins with SBA with no AD to performed exercises and an oral care task, oxygen level desat to 86% with activity but it increased to 96% at rest   Patient performed seated therapeutic exercises to improve B UE/LE strength postural muscle activation and activity tolerance 1X10 reps   Biceps curls   Chest press  Shoulder press   Arm raises  Ankle pumps  Leg kicks   Single leg marches   Lateral leg raises      Activities of Daily Living:  Grooming: supervision to complete oral task in standing     [facility] 6 Click ADL: 20    Treatment & Education:  Discussed OT POC and progress  Educated patient on the importance to continue to perform exercises in order to reduce stiffness and promote joint mobility and blood flow  Addressed patient's questions and concerns within CORTEZ scope of practice      Patient left up in chair with all lines intact, call button in reach, and nurse notified    GOALS:   Multidisciplinary Problems       Occupational Therapy Goals          Problem: Occupational Therapy    Goal Priority Disciplines Outcome Interventions   Occupational Therapy Goal     OT, PT/OT Progressing    Description: Goals to be met by: 8/21/24     Patient will increase functional independence with ADLs by performing:    LE Dressing with Modified [place].  Grooming while standing at sink with Modified [place].  Toileting from toilet/bedside commode with Modified [place] for hygiene and clothing management.   Step transfer with Modified [place]  Toilet transfer to toilet/bedside commode with  Modified [place].                         Time Tracking:     OT Date of Treatment: 07/26/24  OT Start Time: 1256  OT Stop Time: 1313  OT Total Time (min): 17 min    Billable Minutes:Therapeutic Activity 17    OT/MERA: MERA     Number of MERA visits since last OT visit: 1    7/26/2024

## 2024-07-26 NOTE — CARE UPDATE
"RAPID RESPONSE NURSE CHART REVIEW        Chart Reviewed: 07/26/2024, 1:28 AM    MRN: [id number]  Bed: 10698/37585 A    Dx: Right ventricular dysfunction    Yong Mcintyre has a past medical history of Arthritis, CHF (congestive heart failure), Cor pulmonale, Gallstones, GERD (gastroesophageal reflux disease), Hypertension, Morbid obesity, Obesity hypoventilation syndrome, On home oxygen therapy, MALLIKA (obstructive sleep apnea), Paroxysmal atrial fibrillation, PFO (patent foramen ovale), Pickwickian syndrome, and Pulmonary hypertension.    Last VS: /61 (BP Location: Left arm, Patient Position: Lying)   Pulse 86   Temp 98.4 °F (36.9 °C) (Oral)   Resp 16   Ht 5' 4" (1.626 m)   Wt 97.9 kg (215 lb 13.3 oz)   SpO2 (!) 94%   BMI 37.05 kg/m²     24H Vital Sign Range:  Temp:  [97.8 °F (36.6 °C)-98.4 °F (36.9 °C)]   Pulse:  []   Resp:  [16-27]   BP: ()/(51-62)   SpO2:  [90 %-95 %]     Level of Consciousness (AVPU): alert    Recent Labs     07/23/24  0319 07/24/24  0404 07/25/24  0527   WBC 5.94 5.80 5.57   HGB 12.9* 12.2* 12.3*   HCT 44.8 42.5 43.3    191 216       Recent Labs     07/23/24  0319 07/23/24  0955 07/24/24  0404 07/24/24  0753 07/25/24  0527 07/25/24  1514      < > 137 139 141 138   K 3.3*   < > 3.4* 3.7 3.1* 3.5   CL 92*   < > 94* 95 96 96   CO2 36*   < > 31* 38* 37* 35*   BUN 50*   < > 54* 50* 47* 42*   CREATININE 2.1*   < > 2.3* 2.1* 2.0* 2.0*   GLU 92   < > 140* 125* 81 136*   PHOS 4.1  --  3.8  --  3.7 2.7   MG 2.1  --  1.8  --  1.9  --     < > = values in this interval not displayed.        Recent Labs     07/23/24 2028 07/24/24  0744 07/24/24 1933   PH 7.355 7.339* 7.367   PCO2 68.2* 74.6* 68.4*   PO2 26* 35* 31*   HCO3 38.1* 40.1* 39.2*   POCSATURATED 43 61 53   BE 13* 14* 14*        OXYGEN:  Flow (L/min) (Oxygen Therapy): 40  Oxygen Concentration (%): 55 (pt was desating so increased fio2 to 55% with o2 sat 90%)       MEWS score: 1    Rounding completed with charge RN " Dace reports patient BP soft, sats low 90s on 40L 40% comfort flow/airvo. Uses BiPAP at night. On dobutamine, lasix gtts. No additional concerns verbalized at this time. Instructed to call [phone] for further concerns or assistance.    MOISE Lewis RN

## 2024-07-26 NOTE — SUBJECTIVE & OBJECTIVE
Interval History: Pt states he is doing well this morning and has no complaints. Pt has O2 saturation in the low 90s on 40L of HFNC and uses BIPAP at night when sleeping. Pt still plans on going to LTAC in [place] on discharge.     Review of Systems   Constitutional: Negative.   HENT: Negative.     Eyes: Negative.    Cardiovascular: Negative.    Respiratory: Negative.     Endocrine: Negative.    Skin: Negative.    Musculoskeletal: Negative.    Gastrointestinal: Negative.    Genitourinary: Negative.    Neurological: Negative.    Psychiatric/Behavioral: Negative.     Allergic/Immunologic: Negative.      Objective:     Vital Signs (Most Recent):  Temp: 97.9 °F (36.6 °C) (07/26/24 0514)  Pulse: 87 (07/26/24 0700)  Resp: (!) 27 (07/26/24 0700)  BP: (!) 108/59 (07/26/24 0514)  SpO2: (!) 94 % (07/26/24 0700) Vital Signs (24h Range):  Temp:  [97.9 °F (36.6 °C)-98.4 °F (36.9 °C)] 97.9 °F (36.6 °C)  Pulse:  [] 87  Resp:  [16-28] 27  SpO2:  [90 %-94 %] 94 %  BP: ()/(51-62) 108/59     Weight: 97.9 kg (215 lb 13.3 oz)  Body mass index is 37.05 kg/m².     SpO2: (!) 94 %         Intake/Output Summary (Last 24 hours) at 7/26/2024 0740  Last data filed at 7/25/2024 2233  Gross per 24 hour   Intake --   Output 1820 ml   Net -1820 ml       Lines/Drains/Airways       Peripherally Inserted Central Catheter Line  Duration             PICC Double Lumen 07/22/24 1120 right basilic 3 days              Peripheral Intravenous Line  Duration                  Peripheral IV - Single Lumen 07/20/24 2000 18 G Anterior;Proximal;Right Forearm 5 days                       Physical Exam  HENT:      Head: Normocephalic and atraumatic.   Eyes:      Extraocular Movements: Extraocular movements intact.      Conjunctiva/sclera: Conjunctivae normal.      Pupils: Pupils are equal, round, and reactive to light.   Neck:      Comments: JVD noted  Cardiovascular:      Rate and Rhythm: Normal rate and regular rhythm.   Pulmonary:      Effort: Pulmonary  "effort is normal.      Breath sounds: Rales (bilaterally) present.   Abdominal:      General: Abdomen is flat.      Palpations: Abdomen is soft.   Musculoskeletal:      Right lower leg: Edema present.      Left lower leg: Edema present.   Skin:     General: Skin is warm and dry.   Neurological:      Mental Status: He is alert and oriented to person, place, and time.   Psychiatric:         Mood and Affect: Mood normal.         Behavior: Behavior normal.            Significant Labs: CMP   Recent Labs   Lab 07/25/24  0527 07/25/24  1514 07/26/24  0449    138 141   K 3.1* 3.5 3.2*   CL 96 96 97   CO2 37* 35* 34*   GLU 81 136* 78   BUN 47* 42* 44*   CREATININE 2.0* 2.0* 1.7*   CALCIUM 9.3 9.1 9.0   PROT 7.7  --  7.4   ALBUMIN 2.7* 2.9* 2.6*   BILITOT 1.0  --  1.0   ALKPHOS 134  --  131   AST 17  --  19   ALT 10  --  11   ANIONGAP 8 7* 10    and CBC   Recent Labs   Lab 07/25/24  0527 07/26/24  0449   WBC 5.57 5.62   HGB 12.3* 12.0*   HCT 43.3 44.1    236       Significant Imaging: Echocardiogram: Transthoracic echo (TTE) complete (Cupid Only):   Results for orders placed or performed during the hospital encounter of 07/04/24   Echo   Result Value Ref Range    BSA 2.25 m2    LVOT stroke volume 43.90 cm3    LVIDd 5.12 3.5 - 6.0 cm    LV Systolic Volume 70.29 mL    LV Systolic Volume Index 32.7 mL/m2    LVIDs 4.01 (A) 2.1 - 4.0 cm    LV Diastolic Volume 124.80 mL    LV Diastolic Volume Index 58.05 mL/m2    IVS 0.46 (A) 0.6 - 1.1 cm    LVOT diameter 2.26 cm    LVOT area 4.0 cm2    FS 22 (A) 28 - 44 %    Left Ventricle Relative Wall Thickness 0.32 cm    Posterior Wall 0.81 0.6 - 1.1 cm    LV mass 106.01 g    LV Mass Index 49 g/m2    MV Peak E Rick 0.43 m/s    TDI LATERAL 0.14 m/s    TDI SEPTAL 0.08 m/s    E/E' ratio 3.91 m/s    MV Peak A Rick 0.81 m/s    TR Max Rick 4.06 m/s    E/A ratio 0.53     IVRT 54.23 msec    E wave deceleration time 125.22 msec    MV "A" wave duration 9.13 msec    LV SEPTAL E/E' RATIO 5.38 m/s "    LA Volume Index 17.9 mL/m2    LV LATERAL E/E' RATIO 3.07 m/s    LA volume 38.57 cm3    PV Peak S Rick 0.25 m/s    PV Peak D Rick 0.36 m/s    Pulm vein S/D ratio 0.69     LVOT peak rick 0.67 m/s    RV- oneill basal diam 6.4 cm    TAPSE 1.77 cm    LA size 2.54 cm    Left Atrium Minor Axis 5.67 cm    Left Atrium Major Axis 5.71 cm    LA volume (mod) 22.75 cm3    LA WIDTH 3.14 cm    LA Volume Index (Mod) 10.6 mL/m2    RA Major Axis 6.05 cm    RA Width 6.10 cm    AV mean gradient 2 mmHg    AV peak gradient 3 mmHg    Ao peak rick 0.92 m/s    Ao VTI 15.35 cm    LVOT peak VTI 10.95 cm    AV valve area 2.86 cm²    AV Velocity Ratio 0.73     AV index (prosthetic) 0.71     RITIKA by Velocity Ratio 2.92 cm²    MV stenosis pressure 1/2 time 36.31 ms    MV valve area p 1/2 method 6.06 cm2    Triscuspid Valve Regurgitation Peak Gradient 66 mmHg    Sinus 2.77 cm    STJ 2.36 cm    Ascending aorta 2.51 cm    Mean e' 0.11 m/s    ZLVIDS -0.46     ZLVIDD -3.10     TV resting pulmonary artery pressure 81 mmHg    RV TB RVSP 19 mmHg    Est. RA pres 15 mmHg    RA area length vol 119.0 mL    RA area 31.0 cm2    Narrative      Left Ventricle: Mild global hypokinesis present. Septal flattening in   diastole and systole consistent with right ventricular volume and pressure   overload. There is moderately reduced systolic function with a visually   estimated ejection fraction of 35 - 40%. Grade I diastolic dysfunction.   Eatimated CO is 3.4 l/min    Right Ventricle: Severe right ventricular enlargement. Wall thickness   is normal. Right ventricle wall motion has global hypokinesis. Systolic   function is severely reduced.    Right Atrium: Right atrium is severely dilated.    Aortic Valve: The aortic valve is a trileaflet valve. There is mild   aortic valve sclerosis. There is moderate annular calcification present.    Tricuspid Valve: There is moderate regurgitation with an anteromedial   eccentrically directed jet.    Aorta: Aortic root is normal in  size measuring 2.77 cm. Ascending aorta   is normal measuring 2.51 cm.    Pulmonary Artery: There is severe pulmonary hypertension. The estimated   pulmonary artery systolic pressure is 81 mmHg.    IVC/SVC: Elevated venous pressure at 15 mmHg.

## 2024-07-26 NOTE — PLAN OF CARE
11:55 PM SW spoke to Chelsie  with [facility] to follow up on patient's acceptance. Per Chelsie they received auth, but insurance is not able to fund transportation.     12:06 PM DINORA spoke to pt's sister; Paula [phone] to follow up on transportation cost. Per sister, family is willing to pay for transport to facility in [place], [place]. Sister informed that when spoken to Janneth with [facility] she informed that they can assist with third party options for transportation services. Sister stated that she will follow up with facility and call back with a update.     12:58 PM SW received a call from pt's sister who stated that they have decided to move forward with [facility] transport. DINORA attempted to contact Nicolas [phone] [facility] to get instructions on receiving payment-left VM to return call.     1:21 PM SW spoke to Nicolas with [facility] who stated that he will call back with their transport availability and call back.     2:40 PM SW spoke to Nicolas with [facility] to follow up on transport. Per Nicolas, they are still working on arranging appropriate staffing for transfer. Per Nicolas he will be available 24/7 to follow up on updates.     DINORA also spoke to patient's sister to provide update on the above info. Sister voiced understanding, but is requesting that weekend staff follow up with any updates. Sister also stated that the patient has the form of payment in his room to cover all transport expenses.     DINORA spoke to Chelsie with LTAC facility who confirmed that patient can transfer over the weekend with proof of payment from [facility]. Chelsie also confirmed that she can be reached directly as she is on-call both Sat and Sun. Once payment receipt have been provided, she will provide report info.      DINORA will continue to follow up.     Ilana Washington, RIGOBERTO  [facility]  Ext. [phone]

## 2024-07-26 NOTE — PROGRESS NOTES
[facility] - Transplant Stepdown  Cardiology  Progress Note    Patient Name: Yong Mcintyre  MRN: [id number]  Admission Date: 7/4/2024  Hospital Length of Stay: 22 days  Code Status: Full Code   Attending Physician: Geeta Mendenhall MD   Primary Care Physician: Gianni Escalona MD  Expected Discharge Date: 7/26/2024  Principal Problem:Right ventricular dysfunction    Subjective:     Interval History: Pt states he is doing well this morning and has no complaints. Pt has O2 saturation in the low 90s on 40L of HFNC and uses BIPAP at night when sleeping. Pt still plans on going to LTAC in [place] on discharge.     Review of Systems   Constitutional: Negative.   HENT: Negative.     Eyes: Negative.    Cardiovascular: Negative.    Respiratory: Negative.     Endocrine: Negative.    Skin: Negative.    Musculoskeletal: Negative.    Gastrointestinal: Negative.    Genitourinary: Negative.    Neurological: Negative.    Psychiatric/Behavioral: Negative.     Allergic/Immunologic: Negative.      Objective:     Vital Signs (Most Recent):  Temp: 97.9 °F (36.6 °C) (07/26/24 0514)  Pulse: 87 (07/26/24 0700)  Resp: (!) 27 (07/26/24 0700)  BP: (!) 108/59 (07/26/24 0514)  SpO2: (!) 94 % (07/26/24 0700) Vital Signs (24h Range):  Temp:  [97.9 °F (36.6 °C)-98.4 °F (36.9 °C)] 97.9 °F (36.6 °C)  Pulse:  [] 87  Resp:  [16-28] 27  SpO2:  [90 %-94 %] 94 %  BP: ()/(51-62) 108/59     Weight: 97.9 kg (215 lb 13.3 oz)  Body mass index is 37.05 kg/m².     SpO2: (!) 94 %         Intake/Output Summary (Last 24 hours) at 7/26/2024 0740  Last data filed at 7/25/2024 2233  Gross per 24 hour   Intake --   Output 1820 ml   Net -1820 ml       Lines/Drains/Airways       Peripherally Inserted Central Catheter Line  Duration             PICC Double Lumen 07/22/24 1120 right basilic 3 days              Peripheral Intravenous Line  Duration                  Peripheral IV - Single Lumen 07/20/24 2000 18 G Anterior;Proximal;Right Forearm 5 days                        Physical Exam  HENT:      Head: Normocephalic and atraumatic.   Eyes:      Extraocular Movements: Extraocular movements intact.      Conjunctiva/sclera: Conjunctivae normal.      Pupils: Pupils are equal, round, and reactive to light.   Neck:      Comments: JVD noted  Cardiovascular:      Rate and Rhythm: Normal rate and regular rhythm.   Pulmonary:      Effort: Pulmonary effort is normal.      Breath sounds: Rales (bilaterally) present.   Abdominal:      General: Abdomen is flat.      Palpations: Abdomen is soft.   Musculoskeletal:      Right lower leg: Edema present.      Left lower leg: Edema present.   Skin:     General: Skin is warm and dry.   Neurological:      Mental Status: He is alert and oriented to person, place, and time.   Psychiatric:         Mood and Affect: Mood normal.         Behavior: Behavior normal.            Significant Labs: CMP   Recent Labs   Lab 07/25/24  0527 07/25/24  1514 07/26/24  0449    138 141   K 3.1* 3.5 3.2*   CL 96 96 97   CO2 37* 35* 34*   GLU 81 136* 78   BUN 47* 42* 44*   CREATININE 2.0* 2.0* 1.7*   CALCIUM 9.3 9.1 9.0   PROT 7.7  --  7.4   ALBUMIN 2.7* 2.9* 2.6*   BILITOT 1.0  --  1.0   ALKPHOS 134  --  131   AST 17  --  19   ALT 10  --  11   ANIONGAP 8 7* 10    and CBC   Recent Labs   Lab 07/25/24  0527 07/26/24  0449   WBC 5.57 5.62   HGB 12.3* 12.0*   HCT 43.3 44.1    236       Significant Imaging: Echocardiogram: Transthoracic echo (TTE) complete (Cupid Only):   Results for orders placed or performed during the hospital encounter of 07/04/24   Echo   Result Value Ref Range    BSA 2.25 m2    LVOT stroke volume 43.90 cm3    LVIDd 5.12 3.5 - 6.0 cm    LV Systolic Volume 70.29 mL    LV Systolic Volume Index 32.7 mL/m2    LVIDs 4.01 (A) 2.1 - 4.0 cm    LV Diastolic Volume 124.80 mL    LV Diastolic Volume Index 58.05 mL/m2    IVS 0.46 (A) 0.6 - 1.1 cm    LVOT diameter 2.26 cm    LVOT area 4.0 cm2    FS 22 (A) 28 - 44 %    Left Ventricle Relative Wall  "Thickness 0.32 cm    Posterior Wall 0.81 0.6 - 1.1 cm    LV mass 106.01 g    LV Mass Index 49 g/m2    MV Peak E Rick 0.43 m/s    TDI LATERAL 0.14 m/s    TDI SEPTAL 0.08 m/s    E/E' ratio 3.91 m/s    MV Peak A Rick 0.81 m/s    TR Max Rick 4.06 m/s    E/A ratio 0.53     IVRT 54.23 msec    E wave deceleration time 125.22 msec    MV "A" wave duration 9.13 msec    LV SEPTAL E/E' RATIO 5.38 m/s    LA Volume Index 17.9 mL/m2    LV LATERAL E/E' RATIO 3.07 m/s    LA volume 38.57 cm3    PV Peak S Rick 0.25 m/s    PV Peak D Rick 0.36 m/s    Pulm vein S/D ratio 0.69     LVOT peak rick 0.67 m/s    RV- oneill basal diam 6.4 cm    TAPSE 1.77 cm    LA size 2.54 cm    Left Atrium Minor Axis 5.67 cm    Left Atrium Major Axis 5.71 cm    LA volume (mod) 22.75 cm3    LA WIDTH 3.14 cm    LA Volume Index (Mod) 10.6 mL/m2    RA Major Axis 6.05 cm    RA Width 6.10 cm    AV mean gradient 2 mmHg    AV peak gradient 3 mmHg    Ao peak rick 0.92 m/s    Ao VTI 15.35 cm    LVOT peak VTI 10.95 cm    AV valve area 2.86 cm²    AV Velocity Ratio 0.73     AV index (prosthetic) 0.71     RITIKA by Velocity Ratio 2.92 cm²    MV stenosis pressure 1/2 time 36.31 ms    MV valve area p 1/2 method 6.06 cm2    Triscuspid Valve Regurgitation Peak Gradient 66 mmHg    Sinus 2.77 cm    STJ 2.36 cm    Ascending aorta 2.51 cm    Mean e' 0.11 m/s    ZLVIDS -0.46     ZLVIDD -3.10     TV resting pulmonary artery pressure 81 mmHg    RV TB RVSP 19 mmHg    Est. RA pres 15 mmHg    RA area length vol 119.0 mL    RA area 31.0 cm2    Narrative      Left Ventricle: Mild global hypokinesis present. Septal flattening in   diastole and systole consistent with right ventricular volume and pressure   overload. There is moderately reduced systolic function with a visually   estimated ejection fraction of 35 - 40%. Grade I diastolic dysfunction.   Eatimated CO is 3.4 l/min    Right Ventricle: Severe right ventricular enlargement. Wall thickness   is normal. Right ventricle wall motion has global " hypokinesis. Systolic   function is severely reduced.    Right Atrium: Right atrium is severely dilated.    Aortic Valve: The aortic valve is a trileaflet valve. There is mild   aortic valve sclerosis. There is moderate annular calcification present.    Tricuspid Valve: There is moderate regurgitation with an anteromedial   eccentrically directed jet.    Aorta: Aortic root is normal in size measuring 2.77 cm. Ascending aorta   is normal measuring 2.51 cm.    Pulmonary Artery: There is severe pulmonary hypertension. The estimated   pulmonary artery systolic pressure is 81 mmHg.    IVC/SVC: Elevated venous pressure at 15 mmHg.        Assessment and Plan:     Brief HPI: Mr. Mcintyre is a 48 year old male with severe pulmonary HTN (Group 2-3, on 3L home O2, diagnosed prior to 2015, follows with Dr. Child at [facility], MALLIKA, Obesity hypoventilation syndrome, HFpEF, Paroxysmal AFib (on Coumadin), BMI > 35, HTN, RA, COPD who presented with c/o worsening shortness of breath and lower extremity swelling 3 weeks ago. Pt is awaiting transfer to an LTAC in [place]. Cardiology was consulted for optimization of palliative dobutamine and furosemide drips.     *Acute on chronic right-sided congestive heart failure  -Continue dobutamine and lasix gtt  -Start diamox 250 BID PO   -Recommend more frequent BMP checks  -Replete potassium (already ordered)    Atrial flutter   -Continue warfarin and amiodarone      VTE Risk Mitigation (From admission, onward)           Ordered     warfarin (COUMADIN) tablet 5 mg  Once per day on Sunday Monday Tuesday Wednesday Friday Saturday 07/24/24 1125     IP VTE HIGH RISK PATIENT  Once         07/04/24 2302     Place sequential compression device  Until discontinued         07/04/24 2302                    Melissa Shirley DO  Cardiology  [facility] - Trans[facility] Stepdown

## 2024-07-26 NOTE — RESPIRATORY THERAPY
RAPID RESPONSE RESPIRATORY THERAPY PROACTIVE ROUNDING NOTE             Time of visit: 830     Code Status: Full Code   : 1975  Bed: 08625/62619 A:   MRN: [id number]  Time spent at the bedside: < 15 min    SITUATION    Evaluated patient for: HFNC Compliance     BACKGROUND    Patient has a past medical history of Arthritis, CHF (congestive heart failure), Cor pulmonale, Gallstones, GERD (gastroesophageal reflux disease), Hypertension, Morbid obesity, Obesity hypoventilation syndrome, On home oxygen therapy, MALLIKA (obstructive sleep apnea), Paroxysmal atrial fibrillation, PFO (patent foramen ovale), Pickwickian syndrome, and Pulmonary hypertension.    24 Hours Vitals Range:  Temp:  [97.9 °F (36.6 °C)-98.4 °F (36.9 °C)]   Pulse:  []   Resp:  [16-28]   BP: ()/(51-61)   SpO2:  [90 %-94 %]     Labs:    Recent Labs     24  0404 24  0753 24  0527 24  1514 24  0449      < > 141 138 141   K 3.4*   < > 3.1* 3.5 3.2*   CL 94*   < > 96 96 97   CO2 31*   < > 37* 35* 34*   BUN 54*   < > 47* 42* 44*   CREATININE 2.3*   < > 2.0* 2.0* 1.7*   *   < > 81 136* 78   PHOS 3.8  --  3.7 2.7 3.6   MG 1.8  --  1.9  --  1.8    < > = values in this interval not displayed.        Recent Labs     24  0744 24  1933   PH 7.355 7.339* 7.367   PCO2 68.2* 74.6* 68.4*   PO2 26* 35* 31*   HCO3 38.1* 40.1* 39.2*   POCSATURATED 43 61 53   BE 13* 14* 14*       ASSESSMENT/INTERVENTIONS    Pt on airvo    Last VS   Temp: 97.9 °F (36.6 °C) (514)  Pulse: 87 (802)  Resp: 26 (802)  BP: 108/59 ( 0514)  SpO2: 94 % ( 1012)    Level of Consciousness: Level of Consciousness (AVPU): alert  Respiratory Effort: Respiratory Effort: Unlabored Expansion/Accessory Muscle Usage: Expansion/Accessory Muscles/Retractions: expansion symmetric, no retractions, no use of accessory muscles  All Lung Field Breath Sounds: All Lung Fields Breath Sounds: Anterior:,  Lateral:, diminished  ISRAEL Breath Sounds: Anterior:, Posterior:, coarse  LLL Breath Sounds: Anterior:, Posterior:, coarse  RUL Breath Sounds: Anterior:, Posterior:, coarse  RML Breath Sounds: Anterior:, Posterior:, coarse  RLL Breath Sounds: Anterior:, Posterior:, coarse  O2 Device/Concentration: 40L 40%  Was the O2 device able to be weaned? No  Ambu at bedside:      Active Orders   Respiratory Care    Bipap Nighttime and Daytime Nap Use     Frequency: Nighttime and Daytime Nap Use     Number of Occurrences: Until Specified     Order Questions:      Mode BIPAP      FiO2% 40      Inspiratory pressure: 15      Expiratory pressure: 5    Oxygen Continuous     Frequency: Continuous     Number of Occurrences: Until Specified     Order Questions:      Device type: High flow      Device: Comfort Flow      FiO2%: 50      LPM: 40      Titrate O2 per Oxygen Titration Protocol: Yes      To maintain SpO2 goal of: >= 90%      Notify MD of: Inability to achieve desired SpO2; Sudden change in patient status and requires 20% increase in FiO2; Patient requires >60% FiO2    Pulse Oximetry Q4H     Frequency: Q4H     Number of Occurrences: Until Specified       RECOMMENDATIONS    We recommend: RRT Recs: Continue POC per primary team.      FOLLOW-UP    Please call back the Rapid Response RT, Soniya Corrigan, RRT at x [phone] for any questions or concerns.

## 2024-07-27 LAB
ALBUMIN SERPL BCP-MCNC: 2.7 G/DL (ref 3.5–5.2)
ANION GAP SERPL CALC-SCNC: 9 MMOL/L (ref 8–16)
BUN SERPL-MCNC: 40 MG/DL (ref 6–20)
CALCIUM SERPL-MCNC: 9.2 MG/DL (ref 8.7–10.5)
CHLORIDE SERPL-SCNC: 99 MMOL/L (ref 95–110)
CO2 SERPL-SCNC: 32 MMOL/L (ref 23–29)
CREAT SERPL-MCNC: 1.7 MG/DL (ref 0.5–1.4)
EST. GFR  (NO RACE VARIABLE): 49.1 ML/MIN/1.73 M^2
GLUCOSE SERPL-MCNC: 87 MG/DL (ref 70–110)
INR PPP: 1.4 (ref 0.8–1.2)
MAGNESIUM SERPL-MCNC: 1.7 MG/DL (ref 1.6–2.6)
PHOSPHATE SERPL-MCNC: 3.7 MG/DL (ref 2.7–4.5)
POTASSIUM SERPL-SCNC: 3.2 MMOL/L (ref 3.5–5.1)
PROTHROMBIN TIME: 15.4 SEC (ref 9–12.5)
SODIUM SERPL-SCNC: 140 MMOL/L (ref 136–145)

## 2024-07-27 PROCEDURE — 25000003 PHARM REV CODE 250: Performed by: STUDENT IN AN ORGANIZED HEALTH CARE EDUCATION/TRAINING PROGRAM

## 2024-07-27 PROCEDURE — 85610 PROTHROMBIN TIME: CPT

## 2024-07-27 PROCEDURE — 99900035 HC TECH TIME PER 15 MIN (STAT)

## 2024-07-27 PROCEDURE — 94761 N-INVAS EAR/PLS OXIMETRY MLT: CPT

## 2024-07-27 PROCEDURE — 94640 AIRWAY INHALATION TREATMENT: CPT

## 2024-07-27 PROCEDURE — 25000003 PHARM REV CODE 250

## 2024-07-27 PROCEDURE — 99232 SBSQ HOSP IP/OBS MODERATE 35: CPT | Mod: ,,, | Performed by: INTERNAL MEDICINE

## 2024-07-27 PROCEDURE — 20600001 HC STEP DOWN PRIVATE ROOM

## 2024-07-27 PROCEDURE — 25000003 PHARM REV CODE 250: Performed by: INTERNAL MEDICINE

## 2024-07-27 PROCEDURE — A4216 STERILE WATER/SALINE, 10 ML: HCPCS | Performed by: INTERNAL MEDICINE

## 2024-07-27 PROCEDURE — 63600175 PHARM REV CODE 636 W HCPCS: Performed by: STUDENT IN AN ORGANIZED HEALTH CARE EDUCATION/TRAINING PROGRAM

## 2024-07-27 PROCEDURE — 80069 RENAL FUNCTION PANEL: CPT | Performed by: INTERNAL MEDICINE

## 2024-07-27 PROCEDURE — 63600175 PHARM REV CODE 636 W HCPCS

## 2024-07-27 PROCEDURE — 83735 ASSAY OF MAGNESIUM: CPT

## 2024-07-27 PROCEDURE — 94660 CPAP INITIATION&MGMT: CPT

## 2024-07-27 PROCEDURE — 27100171 HC OXYGEN HIGH FLOW UP TO 24 HOURS

## 2024-07-27 RX ORDER — POTASSIUM CHLORIDE 20 MEQ/1
40 TABLET, EXTENDED RELEASE ORAL ONCE
Status: COMPLETED | OUTPATIENT
Start: 2024-07-27 | End: 2024-07-27

## 2024-07-27 RX ADMIN — FUROSEMIDE 40 MG/HR: 10 INJECTION, SOLUTION INTRAMUSCULAR; INTRAVENOUS at 05:07

## 2024-07-27 RX ADMIN — POTASSIUM CHLORIDE 40 MEQ: 1500 TABLET, EXTENDED RELEASE ORAL at 11:07

## 2024-07-27 RX ADMIN — Medication 10 ML: at 05:07

## 2024-07-27 RX ADMIN — FLUTICASONE PROPIONATE AND SALMETEROL 1 PUFF: 50; 250 POWDER RESPIRATORY (INHALATION) at 08:07

## 2024-07-27 RX ADMIN — ACETAZOLAMIDE 250 MG: 250 TABLET ORAL at 08:07

## 2024-07-27 RX ADMIN — WARFARIN SODIUM 5 MG: 5 TABLET ORAL at 05:07

## 2024-07-27 RX ADMIN — ALBUTEROL SULFATE 2 PUFF: 108 AEROSOL, METERED RESPIRATORY (INHALATION) at 08:07

## 2024-07-27 RX ADMIN — FUROSEMIDE 40 MG/HR: 10 INJECTION, SOLUTION INTRAMUSCULAR; INTRAVENOUS at 06:07

## 2024-07-27 RX ADMIN — QUETIAPINE FUMARATE 50 MG: 25 TABLET ORAL at 08:07

## 2024-07-27 RX ADMIN — ALBUTEROL SULFATE 2 PUFF: 108 AEROSOL, METERED RESPIRATORY (INHALATION) at 01:07

## 2024-07-27 RX ADMIN — ALBUTEROL SULFATE 2 PUFF: 108 AEROSOL, METERED RESPIRATORY (INHALATION) at 04:07

## 2024-07-27 RX ADMIN — AMIODARONE HYDROCHLORIDE 200 MG: 200 TABLET ORAL at 08:07

## 2024-07-27 RX ADMIN — ALLOPURINOL 300 MG: 100 TABLET ORAL at 08:07

## 2024-07-27 RX ADMIN — PANTOPRAZOLE SODIUM 40 MG: 40 TABLET, DELAYED RELEASE ORAL at 08:07

## 2024-07-27 RX ADMIN — DOBUTAMINE HYDROCHLORIDE 5 MCG/KG/MIN: 400 INJECTION INTRAVENOUS at 05:07

## 2024-07-27 NOTE — PLAN OF CARE
DINORA met with pt at bedside.  He stated he has not yet heard from [facility] regarding payment.  DINORA called Nicolas ([facility]  NAM) to get update.  Nicolas was not available so EDSW left a voicemail.  DINORA will continue to follow.    1400  On 4th attempt to reach Nicolas, DINORA spoke with him.  He stated that pt would need to call [facility]'s Operations office at [phone] to pay for transportation.  Pt will be scheduled for the 7 AM Monday morning pickup time.    1545  SW and pt called [facility] to submit payment.  [facility] all of the [facility] information again and then stated they would call the pt back to notify him of the price and collect payment.  They again verified pickup time for pt as 7am Monday.    Sharon Blair LMSW  Case Management [facility]  [phone]

## 2024-07-27 NOTE — ASSESSMENT & PLAN NOTE
Mr. Mcintyre is a 48 year old male with severe pulmonary HTN (Group 2-3, on 3L home O2, diagnosed prior to 2015, follows with Dr. Chidl at [facility], MALLIKA, Obesity hypoventilation syndrome, HFpEF, Paroxysmal AFib (on Coumadin), BMI > 35, HTN, RA, COPD who presented with c/o worsening shortness of breath and lower extremity swelling 3 weeks ago. Pt is awaiting transfer to an LTAC in [place]. Cardiology was consulted for optimization of palliative dobutamine and furosemide drips.     Patient is identified as having Systolic (HFrEF) heart failure that is Acute on chronic. CHF is currently uncontrolled due to Continued edema of extremities. Latest ECHO performed and demonstrates- Results for orders placed during the hospital encounter of 07/04/24    Echo    Interpretation Summary    Left Ventricle: Mild global hypokinesis present. Septal flattening in diastole and systole consistent with right ventricular volume and pressure overload. There is moderately reduced systolic function with a visually estimated ejection fraction of 35 - 40%. Grade I diastolic dysfunction. Eatimated CO is 3.4 l/min    Right Ventricle: Severe right ventricular enlargement. Wall thickness is normal. Right ventricle wall motion has global hypokinesis. Systolic function is severely reduced.    Right Atrium: Right atrium is severely dilated.    Aortic Valve: The aortic valve is a trileaflet valve. There is mild aortic valve sclerosis. There is moderate annular calcification present.    Tricuspid Valve: There is moderate regurgitation with an anteromedial eccentrically directed jet.    Aorta: Aortic root is normal in size measuring 2.77 cm. Ascending aorta is normal measuring 2.51 cm.    Pulmonary Artery: There is severe pulmonary hypertension. The estimated pulmonary artery systolic pressure is 81 mmHg.    IVC/SVC: Elevated venous pressure at 15 mmHg.  . Continue Furosemide and monitor clinical status closely. Monitor on telemetry. Patient is off CHF  "pathway.  Monitor strict Is&Os and daily weights.  Place on fluid restriction of 1.5 L. Cardiology has been consulted. Continue to stress to patient importance of self efficacy and  on diet for CHF. Last BNP reviewed- and noted below No results for input(s): "BNP", "BNPTRIAGEBLO" in the last 168 hours.    Plan  - Continue dobutamine and lasix gtt  - Start diamox 250 BID PO   - Recommend more frequent BMP checks  - Maintain K> 4 and Mg > 2    "

## 2024-07-27 NOTE — ASSESSMENT & PLAN NOTE
Rheumatology consulted for evaluation of connective tissue related lung disease. There is a question that the persisting ground glass opacities represent interstitial lung disease rather than just edema. Thus far, patient has no clinical picture that could represent connective tissue disease. Thus far serologies are unremarkable although some panels are still pending. They recommend follow up in the advance lung clinic.

## 2024-07-27 NOTE — PROGRESS NOTES
[facility] - Transplant Stepdown  Cardiology  Progress Note    Patient Name: Yong Mcintyre  MRN: [id number]  Admission Date: 7/4/2024  Hospital Length of Stay: 23 days  Code Status: Full Code   Attending Physician: Geeta Mendenhall MD   Primary Care Physician: Gianni Escalona MD  Expected Discharge Date: 7/27/2024  Principal Problem:Acute on chronic right-sided heart failure    Subjective:     Hospital Course:   The patient was admitted to the CCU for further management of right-sided heart failure. He was diuresed with a lasix gtt. Electrolytes were monitored and repleted as indicated. Palliative care was consulted given his poor prognosis. The patient had significant O2 requirements on admission which were slow to wean. A central line was placed for close CVP monitoring in the setting of aggressive diuresis. Nutrition consult placed for low salt diet education. Acetazolamide was added to his diuretic regimen due to persistent metabolic alkalosis. The patient required intermittent Bipap due to hypercapnic respiratory failure. Dobutamine was added to augment cardiac output to further diurese patient. The patient was in aflutter, EP was consulted. S/p cardioversion 7/16/24 with successful conversion to sinus rhythm. Pulm consulted for hypercapnia. Per pulm ok to remain on HFNC. Presentation concerning for end stage RA causing pum HTN, rheum consulted and recommendations appreciated. No therapies available, but rheum has ordered work up. Switched from IV to PO amio. Continued diamox. Patient to consider Palliative care. Palliative medicine consulted and patient expresses wishes for sister to be a part of discussions. The patient was trialed off  on 7/22, and failed.       Sister was reached and she stated that she would like her brother to come to [place]. The plan discussed was either we lower this patient's lasix and HFNC to an acceptable amount so he can be transported to [place] via helicopter or ambulance. If  this plan is unsuccessful then the plan would be to discharge this patient to hospice to [place]. However the patient is not wanting to consider hospice. Continued discussions resulted in the sister requesting that the patient be transported to [place] as a transfer to a hospital in [place]. If the transfer request fails then alternative is that patient is transferred to LTAC in [place]. The transfer to [facility] in [place] was denied by facility 2/2 to end stage disease and BMI. The patient was then started on the process to transfer to an LTAC in [place]. He was stable for step down to the floor while on HFNC.     Interval History: NAOE, Pt on High flow 40/42 and BiPAP at night. I/O's uop 2.3 L net -1.7 L cumulative -7.19 L. Plan to be transferred an LTAC in [place].     ROS  Objective:     Vital Signs (Most Recent):  Temp: 97.5 °F (36.4 °C) (07/27/24 0732)  Pulse: 80 (07/27/24 0811)  Resp: 19 (07/27/24 0811)  BP: 118/70 (07/27/24 0732)  SpO2: (!) 94 % (07/27/24 0732) Vital Signs (24h Range):  Temp:  [97.5 °F (36.4 °C)-98.4 °F (36.9 °C)] 97.5 °F (36.4 °C)  Pulse:  [80-96] 80  Resp:  [17-87] 19  SpO2:  [82 %-94 %] 94 %  BP: (105-118)/(57-70) 118/70     Weight: 97.9 kg (215 lb 13.3 oz)  Body mass index is 37.05 kg/m².     SpO2: (!) 94 %         Intake/Output Summary (Last 24 hours) at 7/27/2024 0851  Last data filed at 7/27/2024 0813  Gross per 24 hour   Intake 560 ml   Output 2510 ml   Net -1950 ml       Lines/Drains/Airways       Peripherally Inserted Central Catheter Line  Duration             PICC Double Lumen 07/22/24 1120 right basilic 4 days              Peripheral Intravenous Line  Duration                  Peripheral IV - Single Lumen 07/20/24 2000 18 G Anterior;Proximal;Right Forearm 6 days                       Physical Exam  HENT:      Head: Normocephalic and atraumatic.   Eyes:      Extraocular Movements: Extraocular movements intact.      Conjunctiva/sclera: Conjunctivae normal.      Pupils:  Pupils are equal, round, and reactive to light.   Neck:      Comments: JVD noted  Cardiovascular:      Rate and Rhythm: Normal rate and regular rhythm.   Pulmonary:      Effort: Pulmonary effort is normal.      Breath sounds: Rales (bilaterally) present.   Abdominal:      General: Abdomen is flat.      Palpations: Abdomen is soft.   Musculoskeletal:      Right lower leg: Edema present.      Left lower leg: Edema present.   Skin:     General: Skin is warm and dry.   Neurological:      Mental Status: He is alert and oriented to person, place, and time.   Psychiatric:         Mood and Affect: Mood normal.         Behavior: Behavior normal.            Significant Labs: CMP   Recent Labs   Lab 07/26/24  0449 07/26/24  1836 07/27/24  0541    137 140   K 3.2* 3.6 3.2*   CL 97 98 99   CO2 34* 34* 32*   GLU 78 113* 87   BUN 44* 41* 40*   CREATININE 1.7* 1.8* 1.7*   CALCIUM 9.0 9.4 9.2   PROT 7.4  --   --    ALBUMIN 2.6* 3.0* 2.7*   BILITOT 1.0  --   --    ALKPHOS 131  --   --    AST 19  --   --    ALT 11  --   --    ANIONGAP 10 5* 9    and CBC   Recent Labs   Lab 07/26/24  0449   WBC 5.62   HGB 12.0*   HCT 44.1          Significant Imaging:  Reviewed  Assessment and Plan:         * Acute on chronic right-sided heart failure  Acute on chronic. - NYHA class III symptoms with recurrent admissions.  - He was seen by [facility] outpatient 6/2024 who state patient was feeling better on new diuretic regimen however worried about the frequency of use of metolazone and his worsening kidney function.    - Admitted with CXR with cardiomegaly and pulmonary vascular congestion, suggestive of pulmonary edema secondary to CHF. BNP elevated at 800. Creatinine worse at 3.1 on admission.  - Given his recurrent HF admissions and most recent hemodynamics, [facility] believes his overall prognosis is poor and recommended palliative care consult. Palliative care evaluated patient. Patient wishes to continue full measures at this time.     Plan  -  Continue dobutamine and lasix gtt  - Start diamox 250 BID PO   - Recommend more frequent BMP checks  - Maintain K> 4 and Mg > 2    Atypical atrial flutter  -Continue warfarin and amiodarone         VTE Risk Mitigation (From admission, onward)           Ordered     warfarin (COUMADIN) tablet 5 mg  Once per day on Sunday Monday Tuesday Wednesday Friday Saturday 07/24/24 1125     IP VTE HIGH RISK PATIENT  Once         07/04/24 2302     Place sequential compression device  Until discontinued         07/04/24 2302                    Ramsey Knight MD  Cardiology  [facility] - [facility]

## 2024-07-27 NOTE — ASSESSMENT & PLAN NOTE
"Obstructive sleep apnea  Hypoventilation syndrome  High oxygen flow needs  Continue diuresis  BIPAP 15/5 40% qhs and HFNC during day while awake  CT lung showed, "Similar appearance of diffuse ground-glass opacity, suggestive of pulmonary edema. Dilatation of the main pulmonary artery compatible with pulmonary hypertension. Cardiomegaly."   Pulmonary saw patient during stay and recommended rheumatology evaluation for connective tissue related lung disease  Albuterol 2 puff QID  Breo one puff daily  "

## 2024-07-27 NOTE — SUBJECTIVE & OBJECTIVE
Interval History: NAOE, Pt on High flow 40/42 and BiPAP at night. I/O's uop 2.3 L net -1.7 L cumulative -7.19 L. Plan to be transferred an LTAC in [place].     ROS  Objective:     Vital Signs (Most Recent):  Temp: 97.5 °F (36.4 °C) (07/27/24 0732)  Pulse: 80 (07/27/24 0811)  Resp: 19 (07/27/24 0811)  BP: 118/70 (07/27/24 0732)  SpO2: (!) 94 % (07/27/24 0732) Vital Signs (24h Range):  Temp:  [97.5 °F (36.4 °C)-98.4 °F (36.9 °C)] 97.5 °F (36.4 °C)  Pulse:  [80-96] 80  Resp:  [17-87] 19  SpO2:  [82 %-94 %] 94 %  BP: (105-118)/(57-70) 118/70     Weight: 97.9 kg (215 lb 13.3 oz)  Body mass index is 37.05 kg/m².     SpO2: (!) 94 %         Intake/Output Summary (Last 24 hours) at 7/27/2024 0851  Last data filed at 7/27/2024 0813  Gross per 24 hour   Intake 560 ml   Output 2510 ml   Net -1950 ml       Lines/Drains/Airways       Peripherally Inserted Central Catheter Line  Duration             PICC Double Lumen 07/22/24 1120 right basilic 4 days              Peripheral Intravenous Line  Duration                  Peripheral IV - Single Lumen 07/20/24 2000 18 G Anterior;Proximal;Right Forearm 6 days                       Physical Exam  HENT:      Head: Normocephalic and atraumatic.   Eyes:      Extraocular Movements: Extraocular movements intact.      Conjunctiva/sclera: Conjunctivae normal.      Pupils: Pupils are equal, round, and reactive to light.   Neck:      Comments: JVD noted  Cardiovascular:      Rate and Rhythm: Normal rate and regular rhythm.   Pulmonary:      Effort: Pulmonary effort is normal.      Breath sounds: Rales (bilaterally) present.   Abdominal:      General: Abdomen is flat.      Palpations: Abdomen is soft.   Musculoskeletal:      Right lower leg: Edema present.      Left lower leg: Edema present.   Skin:     General: Skin is warm and dry.   Neurological:      Mental Status: He is alert and oriented to person, place, and time.   Psychiatric:         Mood and Affect: Mood normal.         Behavior:  Behavior normal.            Significant Labs: CMP   Recent Labs   Lab 07/26/24  0449 07/26/24  1836 07/27/24  0541    137 140   K 3.2* 3.6 3.2*   CL 97 98 99   CO2 34* 34* 32*   GLU 78 113* 87   BUN 44* 41* 40*   CREATININE 1.7* 1.8* 1.7*   CALCIUM 9.0 9.4 9.2   PROT 7.4  --   --    ALBUMIN 2.6* 3.0* 2.7*   BILITOT 1.0  --   --    ALKPHOS 131  --   --    AST 19  --   --    ALT 11  --   --    ANIONGAP 10 5* 9    and CBC   Recent Labs   Lab 07/26/24 0449   WBC 5.62   HGB 12.0*   HCT 44.1          Significant Imaging:  Reviewed

## 2024-07-27 NOTE — PROGRESS NOTES
Hospital Medicine  Progress note    Team: [facility] HOSP MED C Geeta Mendenhall MD  Admit Date: 7/4/2024  Code status: Full Code    Principal Problem:  Acute on chronic right-sided heart failure    Interval hx:  No new complaints or events    PEx  Temp:  [97.7 °F (36.5 °C)-98.4 °F (36.9 °C)]   Pulse:  [84-95]   Resp:  [16-87]   BP: (105-112)/(57-62)   SpO2:  [85 %-94 %]     Intake/Output Summary (Last 24 hours) at 7/26/2024 2006  Last data filed at 7/26/2024 1725  Gross per 24 hour   Intake 560 ml   Output 920 ml   Net -360 ml     General Appearance: no acute distress, WD, ill-appearing  Heart: regular rate and rhythm, no heave, + JVD  Respiratory: Normal respiratory effort, symmetric excursion, bilateral vesicular breath sounds   Abdomen: Soft, non-tender; bowel sounds active  Skin: intact, no rash, no ulcers  Neurologic:  No focal numbness or weakness  Mental status: Alert, oriented x 4, affect appropriate    Recent Labs   Lab 07/24/24  0404 07/25/24  0527 07/26/24  0449   WBC 5.80 5.57 5.62   HGB 12.2* 12.3* 12.0*   HCT 42.5 43.3 44.1    216 236     Recent Labs   Lab 07/24/24  0404 07/24/24  0753 07/25/24  0527 07/25/24  1514 07/26/24  0449 07/26/24  1836      < > 141 138 141 137   K 3.4*   < > 3.1* 3.5 3.2* 3.6   CL 94*   < > 96 96 97 98   CO2 31*   < > 37* 35* 34* 34*   BUN 54*   < > 47* 42* 44* 41*   CREATININE 2.3*   < > 2.0* 2.0* 1.7* 1.8*   *   < > 81 136* 78 113*   CALCIUM 9.1   < > 9.3 9.1 9.0 9.4   MG 1.8  --  1.9  --  1.8  --    PHOS 3.8  --  3.7 2.7 3.6 2.7    < > = values in this interval not displayed.     Recent Labs   Lab 07/24/24  0404 07/25/24  0527 07/25/24  1514 07/26/24  0449 07/26/24  1836   ALKPHOS 127 134  --  131  --    ALT 10 10  --  11  --    AST 16 17  --  19  --    ALBUMIN 2.5* 2.7* 2.9* 2.6* 3.0*   PROT 7.3 7.7  --  7.4  --    BILITOT 0.8 1.0  --  1.0  --    INR 2.1* 1.9*  --  1.6*  --         Recent Labs   Lab 07/22/24  0740 07/22/24  1353   POCTGLUCOSE 79 106        Scheduled Meds:   acetaZOLAMIDE  250 mg Oral BID    [START ON 7/27/2024] albuterol  2 puff Inhalation QID    allopurinoL  300 mg Oral Daily    [START ON 7/27/2024] amiodarone  200 mg Oral Daily    fluticasone-salmeterol 250-50 mcg/dose  1 puff Inhalation BID    pantoprazole  40 mg Oral Daily    potassium chloride SA        QUEtiapine  50 mg Oral QHS    senna-docusate 8.6-50 mg  1 tablet Oral Daily    sodium chloride 0.9%  10 mL Intravenous Q6H    warfarin  5 mg Oral Once per day on Sunday Monday Tuesday Wednesday Friday Saturday     Continuous Infusions:   DOBUTamine IV infusion (non-titrating)  5 mcg/kg/min Intravenous Continuous 7.8 mL/hr at 07/26/24 0642 5 mcg/kg/min at 07/26/24 0642    furosemide (Lasix) 500 mg in 50 mL infusion (conc: 10 mg/mL)  40 mg/hr Intravenous Continuous 4 mL/hr at 07/26/24 1723 40 mg/hr at 07/26/24 1723     As Needed:    Current Facility-Administered Medications:     0.9% NaCl, , Intravenous, PRN    0.9% NaCl, , Intravenous, PRN    acetaminophen, 650 mg, Oral, Q6H PRN    albuterol, 2 puff, Inhalation, Q4H PRN    aluminum & magnesium hydroxide-simethicone, 30 mL, Oral, Q6H PRN    glycerin adult, 1 suppository, Rectal, Daily PRN    melatonin, 6 mg, Oral, Nightly PRN    potassium chloride SA, , ,     sodium chloride 0.9%, 10 mL, Intravenous, PRN    Flushing PICC/Midline Protocol, , , Until Discontinued **AND** sodium chloride 0.9%, 10 mL, Intravenous, Q6H **AND** sodium chloride 0.9%, 10 mL, Intravenous, PRN    Assessment and Plan  / Problems managed today    * Acute on chronic right-sided heart failure  Acute on chronic diastolic heart function  Severe pulmonary HTN (group 2 and/or 3)  Acute on chronic. - NYHA class III symptoms with recurrent admissions. Admitted to CICU due to high oxygen needs. He required FiO2 70% 40 L. He was placed on dobutamine and lasix drips. He also was given intermittent acetazolamide and diuril. 13 kg weight loss with diuresis. His oxygen needs are very  slow to improve. He at this time requires FiO2 40% 40 L. Patient does not have further medical options and is not heart-lung transplant candidate. He does not wish to pursue palliative care. He wishes to continue medical management with anticipation of being able to be discharged home. He is not able to be weaned off dobutamine drip. Cardiology consulted when he stepped down to floor.     ECHO here showed   Left Ventricle: Mild global hypokinesis present. Septal flattening in diastole and systole consistent with right ventricular volume and pressure overload. There is moderately reduced systolic function with a visually estimated ejection fraction of 35 - 40%. Grade I diastolic dysfunction; Right Ventricle: Severe right ventricular enlargement. Wall thickness is normal. Right ventricle wall motion has global hypokinesis. Systolic function is severely reduced; Right Atrium: Right atrium is severely dilated; Aortic Valve: The aortic valve is a trileaflet valve. There is mild aortic valve sclerosis. There is moderate annular calcification present; Tricuspid Valve: There is moderate regurgitation with an anteromedial eccentrically directed jet; Pulmonary Artery: There is severe pulmonary hypertension. The estimated pulmonary artery systolic pressure is 81 mmHg.;  IVC/SVC: Elevated venous pressure at 15 mmHg     - 2 gram sodium restriction and 1500cc fluid restriction.  - Encourage physical activity with graded exercise program.  - Acetazolamide PO and diuril as needed for signs of contraction alkalosis   - lasix gtt and dobutamine gtt    Chronic lung disease  Rheumatology consulted for evaluation of connective tissue related lung disease. There is a question that the persisting ground glass opacities represent interstitial lung disease rather than just edema. Thus far, patient has no clinical picture that could represent connective tissue disease. Thus far serologies are unremarkable although some panels are still  "pending. They recommend follow up in the [facility] lung [facility].     Acute hypoxic on chronic hypercapnic respiratory failure  Obstructive sleep apnea  Hypoventilation syndrome  High oxygen flow needs  Continue diuresis  BIPAP 15/5 40% qhs and HFNC during day while awake  CT lung showed, "Similar appearance of diffuse ground-glass opacity, suggestive of pulmonary edema. Dilatation of the main pulmonary artery compatible with pulmonary hypertension. Cardiomegaly."   Pulmonary saw patient during stay and recommended rheumatology evaluation for connective tissue related lung disease  Albuterol 2 puff QID  Breo one puff daily    Atypical atrial flutter  Paroxysmal atrial fibrillation  consulted EP, s/p DCCV/DAWIT on 7/16  Given amiodarone loaded - now on amiodarone 200 mg daily  Continue warfarin for INR 2-3    Metabolic alkalosis  Due to aggressive diuresis  Acetazolamide prn     Acute renal failure superimposed on stage 3 chronic kidney disease  cardiorenal  Baseline 1.8-2.5  Admitted with SCr 3.1  Improved with diuresis    Palliative care encounter  Acute care planning  - patient not amenable to palliative options   - plan to transfer patient to LTAC at [place]    Diet:  low sodium  GI PPx: not needed  DVT PPx:  warfarin  Airways: room air  Wounds: none    Goals of Care:  Return to prior functional status     Discharge Planning   ESTEBAN: 7/27/2024   Is the patient medically ready for discharge?:     Reason for patient still in hospital (select all that apply): Patient trending condition and Treatment  Discharge Plan A: Long-term acute care facility (LTAC)        Geeta Mendenhall MD         "

## 2024-07-27 NOTE — ASSESSMENT & PLAN NOTE
Acute care planning  - patient not amenable to palliative options   - plan to transfer patient to LTAC at [place]

## 2024-07-27 NOTE — ASSESSMENT & PLAN NOTE
Paroxysmal atrial fibrillation  consulted EP, s/p DCCV/DAWIT on 7/16  Given amiodarone loaded - now on amiodarone 200 mg daily  Continue warfarin for INR 2-3

## 2024-07-27 NOTE — PLAN OF CARE
Patient is AAOx3. Patient is on a lasix drip @4mg/hr, 4cc/hr and dobutamine drip @ 5mcg/kg/min, .6, 7.8cc/hr. patient is on high flow nasal cannula at 40L 42%. Patient is NSR on telemetry. Patient has been sitting up in the chair all day. K replaced today. Reminded the patient to call for assistance.

## 2024-07-27 NOTE — ASSESSMENT & PLAN NOTE
Acute on chronic diastolic heart function  Severe pulmonary HTN (group 2 and/or 3)  Acute on chronic. - NYHA class III symptoms with recurrent admissions. Admitted to CICU due to high oxygen needs. He required FiO2 70% 40 L. He was placed on dobutamine and lasix drips. He also was given intermittent acetazolamide and diuril. 13 kg weight loss with diuresis. His oxygen needs are very slow to improve. He at this time requires FiO2 40% 40 L. Patient does not have further medical options and is not heart-lung transplant candidate. He does not wish to pursue palliative care. He wishes to continue medical management with anticipation of being able to be discharged home. He is not able to be weaned off dobutamine drip. Cardiology consulted when he stepped down to floor.     ECHO here showed   Left Ventricle: Mild global hypokinesis present. Septal flattening in diastole and systole consistent with right ventricular volume and pressure overload. There is moderately reduced systolic function with a visually estimated ejection fraction of 35 - 40%. Grade I diastolic dysfunction; Right Ventricle: Severe right ventricular enlargement. Wall thickness is normal. Right ventricle wall motion has global hypokinesis. Systolic function is severely reduced; Right Atrium: Right atrium is severely dilated; Aortic Valve: The aortic valve is a trileaflet valve. There is mild aortic valve sclerosis. There is moderate annular calcification present; Tricuspid Valve: There is moderate regurgitation with an anteromedial eccentrically directed jet; Pulmonary Artery: There is severe pulmonary hypertension. The estimated pulmonary artery systolic pressure is 81 mmHg.;  IVC/SVC: Elevated venous pressure at 15 mmHg     - 2 gram sodium restriction and 1500cc fluid restriction.  - Encourage physical activity with graded exercise program.  - Acetazolamide PO and diuril as needed for signs of contraction alkalosis    -acetazolamide 250 mg BID  - lasix  gtt and dobutamine gtt

## 2024-07-27 NOTE — PLAN OF CARE
Pt AAOx4, VSS on 40L High flow NC. SpO2 maintained >88%. NSR on tele. Pt with dobutamine infusing @ 7.8cc/h and lasix infusing @ 4cc/h. 1.5L FR maintained, pt voiding clear yellow urine, 0 BM overnight--see flowsheet for I/O. Pt up with standby assist, remains free from falls/injury. Bed in lowest locked position, call light within reach, POC ongoing. Plan for possible dc today 7/27.

## 2024-07-28 LAB
ALBUMIN SERPL BCP-MCNC: 2.7 G/DL (ref 3.5–5.2)
ANION GAP SERPL CALC-SCNC: 11 MMOL/L (ref 8–16)
BUN SERPL-MCNC: 35 MG/DL (ref 6–20)
CALCIUM SERPL-MCNC: 9.1 MG/DL (ref 8.7–10.5)
CHLORIDE SERPL-SCNC: 96 MMOL/L (ref 95–110)
CO2 SERPL-SCNC: 33 MMOL/L (ref 23–29)
CREAT SERPL-MCNC: 1.7 MG/DL (ref 0.5–1.4)
EST. GFR  (NO RACE VARIABLE): 49.1 ML/MIN/1.73 M^2
GLUCOSE SERPL-MCNC: 89 MG/DL (ref 70–110)
INR PPP: 1.4 (ref 0.8–1.2)
MAGNESIUM SERPL-MCNC: 1.6 MG/DL (ref 1.6–2.6)
PHOSPHATE SERPL-MCNC: 4.1 MG/DL (ref 2.7–4.5)
POTASSIUM SERPL-SCNC: 2.9 MMOL/L (ref 3.5–5.1)
PROTHROMBIN TIME: 15.3 SEC (ref 9–12.5)
SODIUM SERPL-SCNC: 140 MMOL/L (ref 136–145)

## 2024-07-28 PROCEDURE — 63600175 PHARM REV CODE 636 W HCPCS: Performed by: STUDENT IN AN ORGANIZED HEALTH CARE EDUCATION/TRAINING PROGRAM

## 2024-07-28 PROCEDURE — 25000242 PHARM REV CODE 250 ALT 637 W/ HCPCS: Performed by: STUDENT IN AN ORGANIZED HEALTH CARE EDUCATION/TRAINING PROGRAM

## 2024-07-28 PROCEDURE — 25000003 PHARM REV CODE 250: Performed by: STUDENT IN AN ORGANIZED HEALTH CARE EDUCATION/TRAINING PROGRAM

## 2024-07-28 PROCEDURE — 94640 AIRWAY INHALATION TREATMENT: CPT

## 2024-07-28 PROCEDURE — 25000003 PHARM REV CODE 250

## 2024-07-28 PROCEDURE — 85610 PROTHROMBIN TIME: CPT

## 2024-07-28 PROCEDURE — 27100171 HC OXYGEN HIGH FLOW UP TO 24 HOURS

## 2024-07-28 PROCEDURE — 25000242 PHARM REV CODE 250 ALT 637 W/ HCPCS: Performed by: INTERNAL MEDICINE

## 2024-07-28 PROCEDURE — 63600175 PHARM REV CODE 636 W HCPCS

## 2024-07-28 PROCEDURE — 25000003 PHARM REV CODE 250: Performed by: INTERNAL MEDICINE

## 2024-07-28 PROCEDURE — A4216 STERILE WATER/SALINE, 10 ML: HCPCS | Performed by: INTERNAL MEDICINE

## 2024-07-28 PROCEDURE — 99900035 HC TECH TIME PER 15 MIN (STAT)

## 2024-07-28 PROCEDURE — 63600175 PHARM REV CODE 636 W HCPCS: Performed by: INTERNAL MEDICINE

## 2024-07-28 PROCEDURE — 20600001 HC STEP DOWN PRIVATE ROOM

## 2024-07-28 PROCEDURE — 27000221 HC OXYGEN, UP TO 24 HOURS

## 2024-07-28 PROCEDURE — 83735 ASSAY OF MAGNESIUM: CPT

## 2024-07-28 PROCEDURE — 94761 N-INVAS EAR/PLS OXIMETRY MLT: CPT

## 2024-07-28 PROCEDURE — 94799 UNLISTED PULMONARY SVC/PX: CPT

## 2024-07-28 PROCEDURE — 80069 RENAL FUNCTION PANEL: CPT | Performed by: INTERNAL MEDICINE

## 2024-07-28 RX ORDER — AMIODARONE HYDROCHLORIDE 200 MG/1
200 TABLET ORAL DAILY
Start: 2024-07-28 | End: 2024-07-28 | Stop reason: HOSPADM

## 2024-07-28 RX ORDER — POTASSIUM CHLORIDE 750 MG/1
30 CAPSULE, EXTENDED RELEASE ORAL
Start: 2024-07-29

## 2024-07-28 RX ORDER — POTASSIUM CHLORIDE 750 MG/1
30 CAPSULE, EXTENDED RELEASE ORAL
Status: DISCONTINUED | OUTPATIENT
Start: 2024-07-28 | End: 2024-07-29 | Stop reason: HOSPADM

## 2024-07-28 RX ORDER — MAGNESIUM SULFATE HEPTAHYDRATE 40 MG/ML
2 INJECTION, SOLUTION INTRAVENOUS
Status: COMPLETED | OUTPATIENT
Start: 2024-07-28 | End: 2024-07-28

## 2024-07-28 RX ORDER — ACETAZOLAMIDE 250 MG/1
250 TABLET ORAL 2 TIMES DAILY
Start: 2024-07-28 | End: 2025-07-28

## 2024-07-28 RX ORDER — POTASSIUM CHLORIDE 750 MG/1
30 CAPSULE, EXTENDED RELEASE ORAL
Status: DISPENSED | OUTPATIENT
Start: 2024-07-28 | End: 2024-07-28

## 2024-07-28 RX ADMIN — QUETIAPINE FUMARATE 50 MG: 25 TABLET ORAL at 08:07

## 2024-07-28 RX ADMIN — SENNOSIDES AND DOCUSATE SODIUM 1 TABLET: 50; 8.6 TABLET ORAL at 08:07

## 2024-07-28 RX ADMIN — FUROSEMIDE 40 MG/HR: 10 INJECTION, SOLUTION INTRAMUSCULAR; INTRAVENOUS at 01:07

## 2024-07-28 RX ADMIN — POTASSIUM CHLORIDE 30 MEQ: 750 CAPSULE, EXTENDED RELEASE ORAL at 09:07

## 2024-07-28 RX ADMIN — ALBUTEROL SULFATE 2 PUFF: 108 AEROSOL, METERED RESPIRATORY (INHALATION) at 05:07

## 2024-07-28 RX ADMIN — ACETAZOLAMIDE 250 MG: 250 TABLET ORAL at 08:07

## 2024-07-28 RX ADMIN — AMIODARONE HYDROCHLORIDE 200 MG: 200 TABLET ORAL at 08:07

## 2024-07-28 RX ADMIN — ALLOPURINOL 300 MG: 100 TABLET ORAL at 08:07

## 2024-07-28 RX ADMIN — MAGNESIUM SULFATE HEPTAHYDRATE 2 G: 40 INJECTION, SOLUTION INTRAVENOUS at 12:07

## 2024-07-28 RX ADMIN — POTASSIUM CHLORIDE 30 MEQ: 750 CAPSULE, EXTENDED RELEASE ORAL at 10:07

## 2024-07-28 RX ADMIN — DOBUTAMINE HYDROCHLORIDE 5 MCG/KG/MIN: 400 INJECTION INTRAVENOUS at 09:07

## 2024-07-28 RX ADMIN — ACETAZOLAMIDE 250 MG: 250 TABLET ORAL at 09:07

## 2024-07-28 RX ADMIN — FUROSEMIDE 40 MG/HR: 10 INJECTION, SOLUTION INTRAMUSCULAR; INTRAVENOUS at 05:07

## 2024-07-28 RX ADMIN — FLUTICASONE PROPIONATE AND SALMETEROL 1 PUFF: 50; 250 POWDER RESPIRATORY (INHALATION) at 08:07

## 2024-07-28 RX ADMIN — PANTOPRAZOLE SODIUM 40 MG: 40 TABLET, DELAYED RELEASE ORAL at 08:07

## 2024-07-28 RX ADMIN — ALBUTEROL SULFATE 2 PUFF: 108 AEROSOL, METERED RESPIRATORY (INHALATION) at 09:07

## 2024-07-28 RX ADMIN — ALBUTEROL SULFATE 2 PUFF: 108 AEROSOL, METERED RESPIRATORY (INHALATION) at 12:07

## 2024-07-28 RX ADMIN — MAGNESIUM SULFATE HEPTAHYDRATE 2 G: 40 INJECTION, SOLUTION INTRAVENOUS at 09:07

## 2024-07-28 RX ADMIN — WARFARIN SODIUM 5 MG: 5 TABLET ORAL at 05:07

## 2024-07-28 RX ADMIN — POTASSIUM CHLORIDE 30 MEQ: 750 CAPSULE, EXTENDED RELEASE ORAL at 01:07

## 2024-07-28 RX ADMIN — Medication 10 ML: at 05:07

## 2024-07-28 NOTE — RESPIRATORY THERAPY
"RAPID RESPONSE RESPIRATORY THERAPY PROACTIVE ROUNDING NOTE             Time of visit: 830     Code Status: Full Code   : 1975  Bed: 74249/73489 A:   MRN: [id number]  Time spent at the bedside: < 15 min    SITUATION    Evaluated patient for: HFNC Compliance     BACKGROUND    Patient has a past medical history of Arthritis, CHF (congestive heart failure), Cor pulmonale, Gallstones, GERD (gastroesophageal reflux disease), Hypertension, Morbid obesity, Obesity hypoventilation syndrome, On home oxygen therapy, MALLIKA (obstructive sleep apnea), Paroxysmal atrial fibrillation, PFO (patent foramen ovale), Pickwickian syndrome, and Pulmonary hypertension.    24 Hours Vitals Range:  Temp:  [97.8 °F (36.6 °C)-98.7 °F (37.1 °C)]   Pulse:  [82-93]   Resp:  [14-29]   BP: ()/(57-62)   SpO2:  [90 %-99 %]     Labs:    Recent Labs     24  0449 24  1836 24  0541 24  0512    137 140 140   K 3.2* 3.6 3.2* 2.9*   CL 97 98 99 96   CO2 34* 34* 32* 33*   BUN 44* 41* 40* 35*   CREATININE 1.7* 1.8* 1.7* 1.7*   GLU 78 113* 87 89   PHOS 3.6 2.7 3.7 4.1   MG 1.8  --  1.7 1.6        No results for input(s): "PH", "PCO2", "PO2", "HCO3", "POCSATURATED", "BE" in the last 72 hours.    ASSESSMENT/INTERVENTIONS    Pt unlabored, on 40L 39% Airvo, sat 92%. O2 not weaned.    Last VS   Temp: 98 °F (36.7 °C) (709)  Pulse: 93 (955)  Resp: 20 (955)  BP: 118/57 (709)  SpO2: 90 % (955)    Level of Consciousness: Level of Consciousness (AVPU): alert  Respiratory Effort: Respiratory Effort: Normal, Unlabored Expansion/Accessory Muscle Usage: Expansion/Accessory Muscles/Retractions: no use of accessory muscles, no retractions, expansion symmetric  All Lung Field Breath Sounds: All Lung Fields Breath Sounds: Anterior:, Posterior:, clear, equal bilaterally  ISRAEL Breath Sounds: Anterior:, Posterior:, coarse  LLL Breath Sounds: Anterior:, Posterior:, coarse  RUL Breath Sounds: Anterior:, " Posterior:, coarse  RML Breath Sounds: Anterior:, Posterior:, coarse  RLL Breath Sounds: Anterior:, Posterior:, coarse  O2 Device/Concentration: 40L 39% Airvo.  Was the O2 device able to be weaned? No  Ambu at bedside:      Active Orders   Respiratory Care    Bipap Nighttime and Daytime Nap Use     Frequency: Nighttime and Daytime Nap Use     Number of Occurrences: Until Specified     Order Questions:      Mode BIPAP      FiO2% 40      Inspiratory pressure: 15      Expiratory pressure: 5    Oxygen Continuous     Frequency: Continuous     Number of Occurrences: Until Specified     Order Questions:      Device type: High flow      Device: Comfort Flow      FiO2%: 50      LPM: 40      Titrate O2 per Oxygen Titration Protocol: Yes      To maintain SpO2 goal of: >= 90%      Notify MD of: Inability to achieve desired SpO2; Sudden change in patient status and requires 20% increase in FiO2; Patient requires >60% FiO2    Pulse Oximetry Q4H     Frequency: Q4H     Number of Occurrences: Until Specified       RECOMMENDATIONS    We recommend: RRT Recs: Continue POC per primary team.      FOLLOW-UP    Please call back the Rapid Response RT, Valdez Damon RRT at x [phone] for any questions or concerns.

## 2024-07-28 NOTE — PROGRESS NOTES
[facility]  Progress note    Team: [facility] Geeta Mendenhall MD  Admit Date: 7/4/2024  Code status: Full Code    Principal Problem:  Acute on chronic right-sided heart failure    Interval hx:  No new complaints or events    PEx  Temp:  [97.8 °F (36.6 °C)-98.7 °F (37.1 °C)]   Pulse:  [80-95]   Resp:  [14-29]   BP: ()/(57-62)   SpO2:  [90 %-99 %]     Intake/Output Summary (Last 24 hours) at 7/28/2024 0846  Last data filed at 7/28/2024 0705  Gross per 24 hour   Intake 574.4 ml   Output 3000 ml   Net -2425.6 ml     General Appearance: no acute distress, WD, ill-appearing  Heart: regular rate and rhythm, no heave, + JVD  Respiratory: Normal respiratory effort, symmetric excursion, bilateral vesicular breath sounds   Abdomen: Soft, non-tender; bowel sounds active  Skin: intact, no rash, no ulcers  Neurologic:  No focal numbness or weakness  Mental status: Alert, oriented x 4, affect appropriate    Recent Labs   Lab 07/24/24  0404 07/25/24  0527 07/26/24  0449   WBC 5.80 5.57 5.62   HGB 12.2* 12.3* 12.0*   HCT 42.5 43.3 44.1    216 236     Recent Labs   Lab 07/26/24  0449 07/26/24  1836 07/27/24  0541 07/28/24  0512    137 140 140   K 3.2* 3.6 3.2* 2.9*   CL 97 98 99 96   CO2 34* 34* 32* 33*   BUN 44* 41* 40* 35*   CREATININE 1.7* 1.8* 1.7* 1.7*   GLU 78 113* 87 89   CALCIUM 9.0 9.4 9.2 9.1   MG 1.8  --  1.7 1.6   PHOS 3.6 2.7 3.7 4.1     Recent Labs   Lab 07/24/24  0404 07/25/24  0527 07/25/24  1514 07/26/24  0449 07/26/24  1836 07/27/24  0541 07/28/24  0512   ALKPHOS 127 134  --  131  --   --   --    ALT 10 10  --  11  --   --   --    AST 16 17  --  19  --   --   --    ALBUMIN 2.5* 2.7*   < > 2.6* 3.0* 2.7* 2.7*   PROT 7.3 7.7  --  7.4  --   --   --    BILITOT 0.8 1.0  --  1.0  --   --   --    INR 2.1* 1.9*  --  1.6*  --  1.4* 1.4*    < > = values in this interval not displayed.        Recent Labs   Lab 07/22/24  0740 07/22/24  1353   POCTGLUCOSE 79 106       Scheduled Meds:   acetaZOLAMIDE   250 mg Oral BID    albuterol  2 puff Inhalation QID    allopurinoL  300 mg Oral Daily    amiodarone  200 mg Oral Daily    fluticasone-salmeterol 250-50 mcg/dose  1 puff Inhalation BID    pantoprazole  40 mg Oral Daily    potassium chloride  30 mEq Oral Q3H    potassium chloride  30 mEq Oral Daily with breakfast    QUEtiapine  50 mg Oral QHS    senna-docusate 8.6-50 mg  1 tablet Oral Daily    sodium chloride 0.9%  10 mL Intravenous Q6H    warfarin  5 mg Oral Once per day on Sunday Monday Tuesday Wednesday Friday Saturday     Continuous Infusions:   DOBUTamine IV infusion (non-titrating)  5 mcg/kg/min Intravenous Continuous 7.8 mL/hr at 07/27/24 1732 5 mcg/kg/min at 07/27/24 1732    furosemide (Lasix) 500 mg in 50 mL infusion (conc: 10 mg/mL)  40 mg/hr Intravenous Continuous 4 mL/hr at 07/28/24 0505 40 mg/hr at 07/28/24 0505     As Needed:    Current Facility-Administered Medications:     0.9% NaCl, , Intravenous, PRN    0.9% NaCl, , Intravenous, PRN    acetaminophen, 650 mg, Oral, Q6H PRN    albuterol, 2 puff, Inhalation, Q4H PRN    aluminum & magnesium hydroxide-simethicone, 30 mL, Oral, Q6H PRN    glycerin adult, 1 suppository, Rectal, Daily PRN    melatonin, 6 mg, Oral, Nightly PRN    sodium chloride 0.9%, 10 mL, Intravenous, PRN    Flushing PICC/Midline Protocol, , , Until Discontinued **AND** sodium chloride 0.9%, 10 mL, Intravenous, Q6H **AND** sodium chloride 0.9%, 10 mL, Intravenous, PRN    Assessment and Plan  / Problems managed today    * Acute on chronic right-sided heart failure  Acute on chronic diastolic heart function  Severe pulmonary HTN (group 2 and/or 3)  Acute on chronic. - NYHA class III symptoms with recurrent admissions. Admitted to CICU due to high oxygen needs. He required FiO2 70% 40 L. He was placed on dobutamine and lasix drips. He also was given intermittent acetazolamide and diuril. 13 kg weight loss with diuresis. His oxygen needs are very slow to improve. He at this time requires FiO2  40% 40 L. Patient does not have further medical options and is not heart-lung transplant candidate. He does not wish to pursue palliative care. He wishes to continue medical management with anticipation of being able to be discharged home. He is not able to be weaned off dobutamine drip. Cardiology consulted when he stepped down to floor.     ECHO here showed   Left Ventricle: Mild global hypokinesis present. Septal flattening in diastole and systole consistent with right ventricular volume and pressure overload. There is moderately reduced systolic function with a visually estimated ejection fraction of 35 - 40%. Grade I diastolic dysfunction; Right Ventricle: Severe right ventricular enlargement. Wall thickness is normal. Right ventricle wall motion has global hypokinesis. Systolic function is severely reduced; Right Atrium: Right atrium is severely dilated; Aortic Valve: The aortic valve is a trileaflet valve. There is mild aortic valve sclerosis. There is moderate annular calcification present; Tricuspid Valve: There is moderate regurgitation with an anteromedial eccentrically directed jet; Pulmonary Artery: There is severe pulmonary hypertension. The estimated pulmonary artery systolic pressure is 81 mmHg.;  IVC/SVC: Elevated venous pressure at 15 mmHg     - 2 gram sodium restriction and 1500cc fluid restriction.  - Encourage physical activity with graded exercise program.  - Acetazolamide PO and diuril as needed for signs of contraction alkalosis    -acetazolamide 250 mg BID  - lasix gtt and dobutamine gtt    Chronic lung disease  Rheumatology consulted for evaluation of connective tissue related lung disease. There is a question that the persisting ground glass opacities represent interstitial lung disease rather than just edema. Thus far, patient has no clinical picture that could represent connective tissue disease. Thus far serologies are unremarkable although some panels are still pending. They recommend  "follow up in the [facility].     Acute hypoxic on chronic hypercapnic respiratory failure  Obstructive sleep apnea  Hypoventilation syndrome  High oxygen flow needs  Continue diuresis  BIPAP 15/5 40% qhs and HFNC during day while awake  CT lung showed, "Similar appearance of diffuse ground-glass opacity, suggestive of pulmonary edema. Dilatation of the main pulmonary artery compatible with pulmonary hypertension. Cardiomegaly."   Pulmonary saw patient during stay and recommended rheumatology evaluation for connective tissue related lung disease  Albuterol 2 puff QID  Breo one puff daily    Atypical atrial flutter  Paroxysmal atrial fibrillation  consulted EP, s/p DCCV/DAWIT on 7/16  Given amiodarone loaded - now on amiodarone 200 mg daily  Continue warfarin for INR 2-3    Metabolic alkalosis  Due to aggressive diuresis  Acetazolamide prn     Acute renal failure superimposed on stage 3 chronic kidney disease  cardiorenal  Baseline 1.8-2.5  Admitted with SCr 3.1  Improved with diuresis    Palliative care encounter  Acute care planning  - patient not amenable to palliative options   - plan to transfer patient to LTAC at [facility]    Diet:  low sodium  GI PPx: not needed  DVT PPx:  warfarin  Airways: room air  Wounds: none    Goals of Care:  Return to prior functional status     Discharge Planning   ESTEBAN: 7/29/2024   Is the patient medically ready for discharge?:     Reason for patient still in hospital (select all that apply): Patient trending condition and Treatment  Discharge Plan A: Long-term acute care facility (LTAC)        Geeta Mendenhall MD         "

## 2024-07-28 NOTE — PLAN OF CARE
Pt is AAOx4 and ambulatory.  VSS.  Afebrile.  NSR on tele.  Pt is on 40L42% HFNC during the day and is wearing bipap qHS.  Pt denies pain/chest pain/SOB.   gtt infusing @ 5mcg/kg/min.  Lasix gtt infusing @ 4mg/hr.  Voiding per urinal without difficulty.  Up to BR with SBA.  Pt is compliant w/ 1.5L FR.  Pt up in chair during the day, then back in bed for sleep.  Bed remains in lowest locked position.  Nonskids on.  Call bell in reach.  Plan for d/c to LTAC in [place].

## 2024-07-28 NOTE — SUBJECTIVE & OBJECTIVE
Interval History: Pt reports no significant events overnight - has no complaints at this time.     Review of Systems   Constitutional: Negative.   HENT: Negative.     Eyes: Negative.    Cardiovascular: Negative.    Respiratory: Negative.     Endocrine: Negative.    Skin: Negative.    Musculoskeletal: Negative.    Gastrointestinal: Negative.    Genitourinary: Negative.    Neurological: Negative.    Psychiatric/Behavioral: Negative.     Allergic/Immunologic: Negative.      Objective:     Vital Signs (Most Recent):  Temp: 98 °F (36.7 °C) (07/28/24 1143)  Pulse: 82 (07/28/24 1143)  Resp: 20 (07/28/24 1132)  BP: (!) 95/54 (07/28/24 1143)  SpO2: 95 % (07/28/24 1143) Vital Signs (24h Range):  Temp:  [97.8 °F (36.6 °C)-98.7 °F (37.1 °C)] 98 °F (36.7 °C)  Pulse:  [82-93] 82  Resp:  [14-29] 20  SpO2:  [90 %-99 %] 95 %  BP: ()/(54-62) 95/54     Weight: 97.9 kg (215 lb 13.3 oz)  Body mass index is 37.05 kg/m².     SpO2: 95 %         Intake/Output Summary (Last 24 hours) at 7/28/2024 1222  Last data filed at 7/28/2024 0705  Gross per 24 hour   Intake 334.4 ml   Output 2700 ml   Net -2365.6 ml       Lines/Drains/Airways       Peripherally Inserted Central Catheter Line  Duration             PICC Double Lumen 07/22/24 1120 right basilic 6 days                       Physical Exam  HENT:      Head: Normocephalic and atraumatic.   Eyes:      Extraocular Movements: Extraocular movements intact.      Conjunctiva/sclera: Conjunctivae normal.      Pupils: Pupils are equal, round, and reactive to light.   Neck:      Comments: Some JVD noted  Cardiovascular:      Rate and Rhythm: Normal rate and regular rhythm.   Pulmonary:      Effort: Pulmonary effort is normal.      Breath sounds: Normal breath sounds.   Abdominal:      Palpations: Abdomen is soft.      Tenderness: There is no abdominal tenderness.   Musculoskeletal:      Right lower leg: Edema present.      Left lower leg: Edema present.   Skin:     General: Skin is warm and dry.    Neurological:      Mental Status: He is alert and oriented to person, place, and time.     Interval History: Pt states he is doing well this morning and has no complaints. Pt has O2 saturation in the low 90s on 40L of HFNC and uses BIPAP at night when sleeping. Pt still plans on going to LTAC in [place] on discharge.     Review of Systems   Constitutional: Negative.   HENT: Negative.     Eyes: Negative.    Cardiovascular: Negative.    Respiratory: Negative.     Endocrine: Negative.    Skin: Negative.    Musculoskeletal: Negative.    Gastrointestinal: Negative.    Genitourinary: Negative.    Neurological: Negative.    Psychiatric/Behavioral: Negative.     Allergic/Immunologic: Negative.      Objective:     Vital Signs (Most Recent):  Temp: 97.9 °F (36.6 °C) (07/26/24 0514)  Pulse: 87 (07/26/24 0700)  Resp: (!) 27 (07/26/24 0700)  BP: (!) 108/59 (07/26/24 0514)  SpO2: (!) 94 % (07/26/24 0700) Vital Signs (24h Range):  Temp:  [97.9 °F (36.6 °C)-98.4 °F (36.9 °C)] 97.9 °F (36.6 °C)  Pulse:  [] 87  Resp:  [16-28] 27  SpO2:  [90 %-94 %] 94 %  BP: ()/(51-62) 108/59     Weight: 97.9 kg (215 lb 13.3 oz)  Body mass index is 37.05 kg/m².     SpO2: (!) 94 %         Intake/Output Summary (Last 24 hours) at 7/26/2024 0740  Last data filed at 7/25/2024 2233  Gross per 24 hour   Intake --   Output 1820 ml   Net -1820 ml       Lines/Drains/Airways       Peripherally Inserted Central Catheter Line  Duration             PICC Double Lumen 07/22/24 1120 right basilic 3 days              Peripheral Intravenous Line  Duration                  Peripheral IV - Single Lumen 07/20/24 2000 18 G Anterior;Proximal;Right Forearm 5 days                       Physical Exam  HENT:      Head: Normocephalic and atraumatic.   Eyes:      Extraocular Movements: Extraocular movements intact.      Conjunctiva/sclera: Conjunctivae normal.      Pupils: Pupils are equal, round, and reactive to light.   Neck:      Comments: JVD  "noted  Cardiovascular:      Rate and Rhythm: Normal rate and regular rhythm.   Pulmonary:      Effort: Pulmonary effort is normal.      Breath sounds: Rales (bilaterally) present.   Abdominal:      General: Abdomen is flat.      Palpations: Abdomen is soft.   Musculoskeletal:      Right lower leg: Edema present.      Left lower leg: Edema present.   Skin:     General: Skin is warm and dry.   Neurological:      Mental Status: He is alert and oriented to person, place, and time.   Psychiatric:         Mood and Affect: Mood normal.         Behavior: Behavior normal.            Significant Labs: CMP   Recent Labs   Lab 07/26/24  1836 07/27/24  0541 07/28/24  0512    140 140   K 3.6 3.2* 2.9*   CL 98 99 96   CO2 34* 32* 33*   * 87 89   BUN 41* 40* 35*   CREATININE 1.8* 1.7* 1.7*   CALCIUM 9.4 9.2 9.1   ALBUMIN 3.0* 2.7* 2.7*   ANIONGAP 5* 9 11    and CBC No results for input(s): "WBC", "HGB", "HCT", "PLT" in the last 48 hours.    Significant Imaging:  reviewed  "

## 2024-07-28 NOTE — PROGRESS NOTES
[facility] - Transplant Stepdown  Cardiology  Progress Note    Patient Name: Yong Mcintyre  MRN: [id number]  Admission Date: 7/4/2024  Hospital Length of Stay: 24 days  Code Status: Full Code   Attending Physician: Geeta Mendenhall MD   Primary Care Physician: Gianni Escalona MD  Expected Discharge Date: 7/29/2024  Principal Problem:Acute on chronic right-sided heart failure    Subjective:     Hospital Course:   The patient was admitted to the CCU for further management of right-sided heart failure. He was diuresed with a lasix gtt. Electrolytes were monitored and repleted as indicated. Palliative care was consulted given his poor prognosis. The patient had significant O2 requirements on admission which were slow to wean. A central line was placed for close CVP monitoring in the setting of aggressive diuresis. Nutrition consult placed for low salt diet education. Acetazolamide was added to his diuretic regimen due to persistent metabolic alkalosis. The patient required intermittent Bipap due to hypercapnic respiratory failure. Dobutamine was added to augment cardiac output to further diurese patient. The patient was in aflutter, EP was consulted. S/p cardioversion 7/16/24 with successful conversion to sinus rhythm. Pulm consulted for hypercapnia. Per pulm ok to remain on HFNC. Presentation concerning for end stage RA causing pum HTN, rheum consulted and recommendations appreciated. No therapies available, but rheum has ordered work up. Switched from IV to PO amio. Continued diamox. Patient to consider Palliative care. Palliative medicine consulted and patient expresses wishes for sister to be a part of discussions. The patient was trialed off  on 7/22, and failed.       Sister was reached and she stated that she would like her brother to come to [place]. The plan discussed was either we lower this patient's lasix and HFNC to an acceptable amount so he can be transported to [place] via helicopter or ambulance. If  this plan is unsuccessful then the plan would be to discharge this patient to hospice to [place]. However the patient is not wanting to consider hospice. Continued discussions resulted in the sister requesting that the patient be transported to [place] as a transfer to a hospital in [place]. If the transfer request fails then alternative is that patient is transferred to LTAC in [place]. The transfer to [facility] in [place] was denied by facility 2/2 to end stage disease and BMI. The patient was then started on the process to transfer to an LTAC in [place]. He was stable for step down to the floor while on HFNC.     Interval History: Pt reports no significant events overnight - has no complaints at this time.     Review of Systems   Constitutional: Negative.   HENT: Negative.     Eyes: Negative.    Cardiovascular: Negative.    Respiratory: Negative.     Endocrine: Negative.    Skin: Negative.    Musculoskeletal: Negative.    Gastrointestinal: Negative.    Genitourinary: Negative.    Neurological: Negative.    Psychiatric/Behavioral: Negative.     Allergic/Immunologic: Negative.      Objective:     Vital Signs (Most Recent):  Temp: 98 °F (36.7 °C) (07/28/24 1143)  Pulse: 82 (07/28/24 1143)  Resp: 20 (07/28/24 1132)  BP: (!) 95/54 (07/28/24 1143)  SpO2: 95 % (07/28/24 1143) Vital Signs (24h Range):  Temp:  [97.8 °F (36.6 °C)-98.7 °F (37.1 °C)] 98 °F (36.7 °C)  Pulse:  [82-93] 82  Resp:  [14-29] 20  SpO2:  [90 %-99 %] 95 %  BP: ()/(54-62) 95/54     Weight: 97.9 kg (215 lb 13.3 oz)  Body mass index is 37.05 kg/m².     SpO2: 95 %         Intake/Output Summary (Last 24 hours) at 7/28/2024 1222  Last data filed at 7/28/2024 0705  Gross per 24 hour   Intake 334.4 ml   Output 2700 ml   Net -2365.6 ml       Lines/Drains/Airways       Peripherally Inserted Central Catheter Line  Duration             PICC Double Lumen 07/22/24 1120 right basilic 6 days                       Physical Exam  HENT:      Head:  Normocephalic and atraumatic.   Eyes:      Extraocular Movements: Extraocular movements intact.      Conjunctiva/sclera: Conjunctivae normal.      Pupils: Pupils are equal, round, and reactive to light.   Neck:      Comments: Some JVD noted  Cardiovascular:      Rate and Rhythm: Normal rate and regular rhythm.   Pulmonary:      Effort: Pulmonary effort is normal.      Breath sounds: Normal breath sounds.   Abdominal:      Palpations: Abdomen is soft.      Tenderness: There is no abdominal tenderness.   Musculoskeletal:      Right lower leg: Edema present.      Left lower leg: Edema present.   Skin:     General: Skin is warm and dry.   Neurological:      Mental Status: He is alert and oriented to person, place, and time.     Interval History: Pt states he is doing well this morning and has no complaints. Pt has O2 saturation in the low 90s on 40L of HFNC and uses BIPAP at night when sleeping. Pt still plans on going to LTAC in [place] on discharge.     Review of Systems   Constitutional: Negative.   HENT: Negative.     Eyes: Negative.    Cardiovascular: Negative.    Respiratory: Negative.     Endocrine: Negative.    Skin: Negative.    Musculoskeletal: Negative.    Gastrointestinal: Negative.    Genitourinary: Negative.    Neurological: Negative.    Psychiatric/Behavioral: Negative.     Allergic/Immunologic: Negative.      Objective:     Vital Signs (Most Recent):  Temp: 97.9 °F (36.6 °C) (07/26/24 0514)  Pulse: 87 (07/26/24 0700)  Resp: (!) 27 (07/26/24 0700)  BP: (!) 108/59 (07/26/24 0514)  SpO2: (!) 94 % (07/26/24 0700) Vital Signs (24h Range):  Temp:  [97.9 °F (36.6 °C)-98.4 °F (36.9 °C)] 97.9 °F (36.6 °C)  Pulse:  [] 87  Resp:  [16-28] 27  SpO2:  [90 %-94 %] 94 %  BP: ()/(51-62) 108/59     Weight: 97.9 kg (215 lb 13.3 oz)  Body mass index is 37.05 kg/m².     SpO2: (!) 94 %         Intake/Output Summary (Last 24 hours) at 7/26/2024 0746  Last data filed at 7/25/2024 5850  Gross per 24 hour   Intake --  "  Output 1820 ml   Net -1820 ml       Lines/Drains/Airways       Peripherally Inserted Central Catheter Line  Duration             PICC Double Lumen 07/22/24 1120 right basilic 3 days              Peripheral Intravenous Line  Duration                  Peripheral IV - Single Lumen 07/20/24 2000 18 G Anterior;Proximal;Right Forearm 5 days                       Physical Exam  HENT:      Head: Normocephalic and atraumatic.   Eyes:      Extraocular Movements: Extraocular movements intact.      Conjunctiva/sclera: Conjunctivae normal.      Pupils: Pupils are equal, round, and reactive to light.   Neck:      Comments: JVD noted  Cardiovascular:      Rate and Rhythm: Normal rate and regular rhythm.   Pulmonary:      Effort: Pulmonary effort is normal.      Breath sounds: Rales (bilaterally) present.   Abdominal:      General: Abdomen is flat.      Palpations: Abdomen is soft.   Musculoskeletal:      Right lower leg: Edema present.      Left lower leg: Edema present.   Skin:     General: Skin is warm and dry.   Neurological:      Mental Status: He is alert and oriented to person, place, and time.   Psychiatric:         Mood and Affect: Mood normal.         Behavior: Behavior normal.            Significant Labs: CMP   Recent Labs   Lab 07/26/24  1836 07/27/24  0541 07/28/24  0512    140 140   K 3.6 3.2* 2.9*   CL 98 99 96   CO2 34* 32* 33*   * 87 89   BUN 41* 40* 35*   CREATININE 1.8* 1.7* 1.7*   CALCIUM 9.4 9.2 9.1   ALBUMIN 3.0* 2.7* 2.7*   ANIONGAP 5* 9 11    and CBC No results for input(s): "WBC", "HGB", "HCT", "PLT" in the last 48 hours.    Significant Imaging:  reviewed  Assessment and Plan:     Brief HPI: Mr. Mcintyre is a 48 year old male with severe pulmonary HTN (Group 2-3, on 3L home O2, diagnosed prior to 2015, follows with Dr. Child at [facility], [place], Obesity hypoventilation syndrome, HFpEF, Paroxysmal AFib (on Coumadin), BMI > 35, HTN, RA, COPD who presented with c/o worsening shortness of breath and " lower extremity swelling 3 weeks ago. Pt is transferring to an LTAC in [place] tomorrow morning at 7 am. Cardiology was consulted for optimization of palliative dobutamine and furosemide drips.     Acute on chronic right-sided congestive heart failure  -Continue dobutamine and lasix gtt     Atrial flutter              -Continue warfarin and amiodarone      VTE Risk Mitigation (From admission, onward)           Ordered     warfarin (COUMADIN) tablet 5 mg  Once per day on Sunday Monday Tuesday Wednesday Friday Saturday 07/24/24 1125     IP VTE HIGH RISK PATIENT  Once         07/04/24 2302     Place sequential compression device  Until discontinued         07/04/24 2302                  We will sign off. Please do not hesitate to contact our team with any questions or concerns.      Melissa Shirley, DO  Cardiology  [facility] - Transplant Stepdown

## 2024-07-28 NOTE — PLAN OF CARE
Pt aao x3. Vss. No acute distress. Pt on high flow O2= 40L at 42%. Pt wears a Bipap at night. Pt's Mag 1.6 replaced with 2gm x 2 doses. Pt's K=2.9 and replaced with 30mEq x 3 doses. Pt switches to nonrebreather mask and O2 tanks to ambulate to bathroom for bowel movements. Pt steady on his feet. Pt ashish. Plan is for pt to be discharged to an LTAC in [place] closer to his family in [place]. Pt to be transported in ambulance with high flow O2. See  note for details. Pt on a dobutamine and lasix drip. See orders for rates. See assessment for full chart details. Will continue to monitor, assess and adjust care as needed.

## 2024-07-28 NOTE — PLAN OF CARE
CLIFFORD FARIAS met with pt at bedside to discuss payment to [facility] for transport to [place]. He has made full payment. He called [facility] to ask for his emailed receipt which he has not yet received and we both verified that the trip is confirmed for tomorrow at 7am. He is asking for a prescription of Seroquel at discharge. DINORA updated pts care team via secure chat as well as pts nurse.     11:12- DINORA called Chelsie with [facility] at [phone] and updated her that payment has been made and  is scheduled for 7 am tomorrow and asked if I could obtain report information in order to be ready for discharge in the am. She stated that she would need a copy of the pts paid receipt emailed to her at [email or web address] in order to give it to her . Once she receives the copy we will need to call back in the am to get report information from their house supervisor that is on call 24 hours a day and they will give us an ETA. I updated her that per [facility] the trip is about 8 hours long. CM will return to pts room to see if he has received a copy of paid receipt.     11:22- CLIFFORD spoke to [facility] at [phone]  and he has not yet received email with paid receipt. He is going to call again and will call me back. CM following.     1:41- CM returned to pts room and assisted him in forwarding the paid invoice to my email address in order to forward it to Chelsie at [facility] . CM awaiting received email.     2:25- CM returned to pts room after not receiving an email and took a picture of pts paid receipt on my cellphone. I then forwarded the picture to Chelsie's email and asked her to respond once it has been received.  has been confirmed for 7am.   Nursing will need to call the 24 hour on call charge nurse in the am for report information .     Carissa Rodrigez, LCSW-BACS

## 2024-07-28 NOTE — DISCHARGE SUMMARY
Discharge Summary  Hospital Medicine    Attending Provider on Discharge: Geeta Mendenhall MD  Hospital Medicine Team: [facility]OS[facility] MED C  Date of Admission:  7/4/2024     Date of Discharge:    Code status: Full Code    Active Hospital Problems    Diagnosis  POA    *Acute on chronic right-sided heart failure [I50.813]  Yes     Priority: 1 - High    Acute on chronic diastolic CHF (congestive heart failure), NYHA class 4 [I50.33]  Yes     Priority: 1 - High    Pulmonary hypertension [I27.20]  Yes     Priority: 1 - High     Chronic     WHO group 2-3 per his managing pulmonologist (Danyell at [facility])      Chronic lung disease [J98.4]  Yes     Priority: 2     Acute hypoxic on chronic hypercapnic respiratory failure [J96.01, J96.12]  Yes     Priority: 2     MALLIKA (obstructive sleep apnea) [G47.33]  Yes     Priority: 2      Chronic    Hypoventilation syndrome [R06.89]  Yes     Priority: 2     Atypical atrial flutter [I48.4]  No     Priority: 4     Paroxysmal A-fib [I48.0]  Yes     Priority: 4     Metabolic alkalosis [E87.3]  Yes     Priority: 5     Acute renal failure superimposed on stage 3 chronic kidney disease [N17.9, N18.30]  Yes     Priority: 5     Advance care planning [Z71.89]  Not Applicable     Priority: 6       Resolved Hospital Problems   No resolved problems to display.     HPI  Yong Mcintyre is a 48 y.o. male with severe pulmonary HTN (Group 2-3, on 3L home O2, diagnosed prior to 2015, follows with Dr. Child at [facility], MALLIKA, Obesity hypoventilation syndrome, HFpEF, Paroxysmal AFib (on Coumadin), BMI > 35, HTN who presents for worsening shortness of breath and lower extremity swelling over the past 2 weeks. This is his 5th admission this year. He reports compliance with his medications. His diuretic regimen was adjusted to the following; Torsemide 60 mg twice daily and metolazone 2.5 on M/WF/Sunday. Clinically reports NYHA class III symptoms. He reports his dry weight is 223lbs and is currently weighing in at  245lbs. He uses 3L of O2 at home and is currently on high flow 40L at 70%.     Hospital Course  * Acute on chronic right-sided heart failure  Acute on chronic diastolic heart function  Severe pulmonary HTN (group 2 and/or 3)  Acute on chronic. - NYHA class III symptoms with recurrent admissions. Admitted to CICU due to high oxygen needs. He required FiO2 70% 40 L. He was placed on dobutamine and lasix drips. He also was given intermittent acetazolamide and diuril. 13 kg weight loss with diuresis. His oxygen needs are very slow to improve. He at this time requires FiO2 40% 40 L. Patient does not have further medical options and is not heart-lung transplant candidate at our facility. He does not wish to pursue palliative care. He wishes to continue medical management with anticipation of being able to be discharged home. He is not able to be weaned off dobutamine drip. Cardiology consulted when he stepped down to floor. They added acetazolamide 250 mg BID. Patient with non-sustained hypotensive episode as low as SBP 80s. He is asymptomatic. Diuresis has continued. Patient agreeable to transport to LTAC near his family at [place].    ECHO here showed   Left Ventricle: Mild global hypokinesis present. Septal flattening in diastole and systole consistent with right ventricular volume and pressure overload. There is moderately reduced systolic function with a visually estimated ejection fraction of 35 - 40%. Grade I diastolic dysfunction; Right Ventricle: Severe right ventricular enlargement. Wall thickness is normal. Right ventricle wall motion has global hypokinesis. Systolic function is severely reduced; Right Atrium: Right atrium is severely dilated; Aortic Valve: The aortic valve is a trileaflet valve. There is mild aortic valve sclerosis. There is moderate annular calcification present; Tricuspid Valve: There is moderate regurgitation with an anteromedial eccentrically directed jet; Pulmonary Artery: There is severe  "pulmonary hypertension. The estimated pulmonary artery systolic pressure is 81 mmHg.;  IVC/SVC: Elevated venous pressure at 15 mmHg     - 2 gram sodium restriction, high protein diet and 1500cc fluid restriction.  - Encourage physical activity with graded exercise program.  - lasix gtt and dobutamine gtt   - acetazolamide 250 mg BI  - daily weights    Chronic lung disease  Rheumatology consulted for evaluation of connective tissue related lung disease. There is a question that the persisting ground glass opacities represent interstitial lung disease rather than just edema. Thus far, patient has no clinical picture that could represent connective tissue disease. Thus far serologies are unremarkable although some panels are still pending. They recommend follow up in the advance lung clinic.     Acute hypoxic on chronic hypercapnic respiratory failure  Obstructive sleep apnea  Hypoventilation syndrome  High oxygen flow needs  Continue diuresis  BIPAP 15/5 40% qhs and HFNC during day while awake  CT lung showed, "Similar appearance of diffuse ground-glass opacity, suggestive of pulmonary edema. Dilatation of the main pulmonary artery compatible with pulmonary hypertension. Cardiomegaly."   Pulmonary saw patient during stay and recommended rheumatology evaluation for connective tissue related lung disease  Albuterol 2 puff QID  Breo one puff daily    Atypical atrial flutter  Paroxysmal atrial fibrillation  consulted EP, s/p DCCV/DAWIT on 7/16  Given amiodarone loaded - now on amiodarone 200 mg daily  Continue warfarin for INR 2-3    Metabolic alkalosis  Due to aggressive diuresis  Acetazolamide prn     Acute renal failure superimposed on stage 3 chronic kidney disease  cardiorenal  Baseline 1.8-2.5  Admitted with SCr 3.1  Improved with diuresis    Palliative care encounter  Acute care planning  - patient not amenable to palliative options   - plan to transfer patient to LTAC at " [place]    Hypomagnesemia  Hypopotassium  replace      Procedures: none    Consultants: palliative care, pulmonary, arrhythmia, rheumatology, cardiology    Current Discharge Medication List        START taking these medications    Details   acetaminophen (TYLENOL) 325 MG tablet Take 2 tablets (650 mg total) by mouth every 6 (six) hours as needed (for any pain or temp >100).      acetaZOLAMIDE (DIAMOX) 250 MG tablet Take 1 tablet (250 mg total) by mouth 2 (two) times daily.      amiodarone (PACERONE) 200 MG Tab Take 1 tablet (200 mg total) by mouth once daily. Start 7/28/2024      DOBUTamine (DOBUTREX) 1,000 mg/250 mL (4,000 mcg/mL) infusion Inject 518 mcg/min into the vein continuous.      furosemide 10 mg/mL Soln 500 mg infusion Inject 40 mg/hr into the vein continuous.      QUEtiapine (SEROQUEL) 50 MG tablet Take 1 tablet (50 mg total) by mouth every evening.      senna-docusate 8.6-50 mg (PERICOLACE) 8.6-50 mg per tablet Take 1 tablet by mouth once daily.           CONTINUE these medications which have CHANGED    Details   !! albuterol (PROVENTIL/VENTOLIN HFA) 90 mcg/actuation inhaler Inhale 2 puffs into the lungs every 6 (six) hours. Rescue      !! albuterol (VENTOLIN HFA) 90 mcg/actuation inhaler Inhale 2 puffs into the lungs every 4 (four) hours as needed for Wheezing. Rescue      potassium chloride (MICRO-K) 10 MEQ CpSR Take 3 capsules (30 mEq total) by mouth daily with breakfast.      warfarin (COUMADIN) 5 MG tablet Takes 5 mg every day except Thursday. Takes no warfarin at all on Thursdays.       !! - Potential duplicate medications found. Please discuss with provider.        CONTINUE these medications which have NOT CHANGED    Details   allopurinoL (ZYLOPRIM) 300 MG tablet Take 1 tablet (300 mg total) by mouth once daily.  Qty: 90 tablet, Refills: 3    Associated Diagnoses: Chronic pulmonary heart disease; Primary pulmonary hypertension; Atrial fibrillation, unspecified type; Pulmonary hypertension; PFO  (patent foramen ovale); Paroxysmal atrial fibrillation; Cor pulmonale; Pickwickian syndrome; Hyperthyroidism      pantoprazole (PROTONIX) 40 MG tablet Take 1 tablet (40 mg total) by mouth once daily.  Qty: 90 tablet, Refills: 3      tiotropium (SPIRIVA) 18 mcg inhalation capsule Inhale 18 mcg into the lungs once daily.      WIXELA INHUB 250-50 mcg/dose diskus inhaler 1 puff 2 (two) times daily. USE 1 INHALATION BY MOUTH TWICE DAILY           STOP taking these medications       metOLazone (ZAROXOLYN) 2.5 MG tablet Comments:   Reason for Stopping:         torsemide (DEMADEX) 20 MG Tab Comments:   Reason for Stopping:         acetaminophen (TYLENOL ARTHRITIS ORAL) Comments:   Reason for Stopping:         ascorbic acid (VITAMIN C ORAL) Comments:   Reason for Stopping:         b complex vitamins tablet Comments:   Reason for Stopping:         CALCIUM ORAL Comments:   Reason for Stopping:         multivit,calc,min/FA/K1/lycop (ONE-A-DAY MEN'S COMPLETE ORAL) Comments:   Reason for Stopping:               Discharge Diet:2 gram sodium diet   High protein diet    Activity: activity as tolerated    Discharge Condition: Good    Disposition: Home or Self Care    Tests pending at the time of discharge: none      Time spent  on the discharge of the patient including review of hospital course with the patient. reviewing discharge medications and arranging follow-up care 35 min    Discharge examination Patient was seen and examined on the date of discharge and determined to be suitable for discharge.    Discharge plan   As per LTAC    Geeta Mendenhall MD

## 2024-07-29 VITALS
WEIGHT: 216.5 LBS | HEART RATE: 85 BPM | HEIGHT: 64 IN | DIASTOLIC BLOOD PRESSURE: 61 MMHG | BODY MASS INDEX: 36.96 KG/M2 | TEMPERATURE: 99 F | RESPIRATION RATE: 22 BRPM | SYSTOLIC BLOOD PRESSURE: 107 MMHG | OXYGEN SATURATION: 96 %

## 2024-07-29 LAB
ALBUMIN SERPL BCP-MCNC: 2.8 G/DL (ref 3.5–5.2)
ANION GAP SERPL CALC-SCNC: 9 MMOL/L (ref 8–16)
BASOPHILS # BLD AUTO: 0.05 K/UL (ref 0–0.2)
BASOPHILS NFR BLD: 0.8 % (ref 0–1.9)
BUN SERPL-MCNC: 37 MG/DL (ref 6–20)
CALCIUM SERPL-MCNC: 9.2 MG/DL (ref 8.7–10.5)
CHLORIDE SERPL-SCNC: 97 MMOL/L (ref 95–110)
CO2 SERPL-SCNC: 33 MMOL/L (ref 23–29)
CREAT SERPL-MCNC: 1.9 MG/DL (ref 0.5–1.4)
DIFFERENTIAL METHOD BLD: ABNORMAL
EOSINOPHIL # BLD AUTO: 0.3 K/UL (ref 0–0.5)
EOSINOPHIL NFR BLD: 4.2 % (ref 0–8)
ERYTHROCYTE [DISTWIDTH] IN BLOOD BY AUTOMATED COUNT: 22.8 % (ref 11.5–14.5)
EST. GFR  (NO RACE VARIABLE): 43 ML/MIN/1.73 M^2
GLUCOSE SERPL-MCNC: 90 MG/DL (ref 70–110)
HCT VFR BLD AUTO: 42.9 % (ref 40–54)
HGB BLD-MCNC: 11.9 G/DL (ref 14–18)
IMM GRANULOCYTES # BLD AUTO: 0.01 K/UL (ref 0–0.04)
IMM GRANULOCYTES NFR BLD AUTO: 0.2 % (ref 0–0.5)
INR PPP: 1.4 (ref 0.8–1.2)
LYMPHOCYTES # BLD AUTO: 1.3 K/UL (ref 1–4.8)
LYMPHOCYTES NFR BLD: 22.5 % (ref 18–48)
MAGNESIUM SERPL-MCNC: 2.2 MG/DL (ref 1.6–2.6)
MCH RBC QN AUTO: 24.4 PG (ref 27–31)
MCHC RBC AUTO-ENTMCNC: 27.7 G/DL (ref 32–36)
MCV RBC AUTO: 88 FL (ref 82–98)
MONOCYTES # BLD AUTO: 0.5 K/UL (ref 0.3–1)
MONOCYTES NFR BLD: 8 % (ref 4–15)
NEUTROPHILS # BLD AUTO: 3.8 K/UL (ref 1.8–7.7)
NEUTROPHILS NFR BLD: 64.3 % (ref 38–73)
NRBC BLD-RTO: 0 /100 WBC
PHOSPHATE SERPL-MCNC: 4.1 MG/DL (ref 2.7–4.5)
PLATELET # BLD AUTO: 219 K/UL (ref 150–450)
PMV BLD AUTO: 9.9 FL (ref 9.2–12.9)
POTASSIUM SERPL-SCNC: 3 MMOL/L (ref 3.5–5.1)
PROTHROMBIN TIME: 15.5 SEC (ref 9–12.5)
RBC # BLD AUTO: 4.87 M/UL (ref 4.6–6.2)
SODIUM SERPL-SCNC: 139 MMOL/L (ref 136–145)
WBC # BLD AUTO: 5.91 K/UL (ref 3.9–12.7)

## 2024-07-29 PROCEDURE — 80069 RENAL FUNCTION PANEL: CPT | Performed by: INTERNAL MEDICINE

## 2024-07-29 PROCEDURE — 63600175 PHARM REV CODE 636 W HCPCS

## 2024-07-29 PROCEDURE — 83735 ASSAY OF MAGNESIUM: CPT

## 2024-07-29 PROCEDURE — 25000003 PHARM REV CODE 250: Performed by: STUDENT IN AN ORGANIZED HEALTH CARE EDUCATION/TRAINING PROGRAM

## 2024-07-29 PROCEDURE — 25000003 PHARM REV CODE 250: Performed by: INTERNAL MEDICINE

## 2024-07-29 PROCEDURE — 99900035 HC TECH TIME PER 15 MIN (STAT)

## 2024-07-29 PROCEDURE — 85025 COMPLETE CBC W/AUTO DIFF WBC: CPT | Performed by: INTERNAL MEDICINE

## 2024-07-29 PROCEDURE — 85610 PROTHROMBIN TIME: CPT

## 2024-07-29 PROCEDURE — 25000003 PHARM REV CODE 250

## 2024-07-29 PROCEDURE — 94761 N-INVAS EAR/PLS OXIMETRY MLT: CPT

## 2024-07-29 PROCEDURE — 94660 CPAP INITIATION&MGMT: CPT

## 2024-07-29 PROCEDURE — 27100171 HC OXYGEN HIGH FLOW UP TO 24 HOURS

## 2024-07-29 RX ADMIN — AMIODARONE HYDROCHLORIDE 200 MG: 200 TABLET ORAL at 06:07

## 2024-07-29 RX ADMIN — ALLOPURINOL 300 MG: 100 TABLET ORAL at 06:07

## 2024-07-29 RX ADMIN — ALBUTEROL SULFATE 2 PUFF: 108 AEROSOL, METERED RESPIRATORY (INHALATION) at 06:07

## 2024-07-29 RX ADMIN — FLUTICASONE PROPIONATE AND SALMETEROL 1 PUFF: 50; 250 POWDER RESPIRATORY (INHALATION) at 06:07

## 2024-07-29 RX ADMIN — PANTOPRAZOLE SODIUM 40 MG: 40 TABLET, DELAYED RELEASE ORAL at 06:07

## 2024-07-29 RX ADMIN — POTASSIUM CHLORIDE 30 MEQ: 750 CAPSULE, EXTENDED RELEASE ORAL at 06:07

## 2024-07-29 RX ADMIN — FUROSEMIDE 40 MG/HR: 10 INJECTION, SOLUTION INTRAMUSCULAR; INTRAVENOUS at 06:07

## 2024-07-29 RX ADMIN — ACETAZOLAMIDE 250 MG: 250 TABLET ORAL at 06:07

## 2024-07-29 NOTE — RESPIRATORY THERAPY
"RAPID RESPONSE RESPIRATORY THERAPY PROACTIVE ROUNDING NOTE             Time of visit: 733     Code Status: Full Code   : 1975  Bed: 44768/23170 A:   MRN: [id number]  Time spent at the bedside: < 15 min    SITUATION    Evaluated patient for: HFNC Compliance     BACKGROUND    Patient has a past medical history of Arthritis, CHF (congestive heart failure), Cor pulmonale, Gallstones, GERD (gastroesophageal reflux disease), Hypertension, Morbid obesity, Obesity hypoventilation syndrome, On home oxygen therapy, MALLIKA (obstructive sleep apnea), Paroxysmal atrial fibrillation, PFO (patent foramen ovale), Pickwickian syndrome, and Pulmonary hypertension.    24 Hours Vitals Range:  Temp:  [97.7 °F (36.5 °C)-98.7 °F (37.1 °C)]   Pulse:  [81-98]   Resp:  [14-25]   BP: ()/(51-71)   SpO2:  [90 %-96 %]     Labs:    Recent Labs     24  0541 24  0512 24  0610    140 139   K 3.2* 2.9* 3.0*   CL 99 96 97   CO2 32* 33* 33*   BUN 40* 35* 37*   CREATININE 1.7* 1.7* 1.9*   GLU 87 89 90   PHOS 3.7 4.1 4.1   MG 1.7 1.6 2.2        No results for input(s): "PH", "PCO2", "PO2", "HCO3", "POCSATURATED", "BE" in the last 72 hours.    ASSESSMENT/INTERVENTIONS    Patient sitting on edge of bed on AIRVO device, SAT 94%    Last VS   Temp: 98.5 °F (36.9 °C) (729)  Pulse: 85 (729)  Resp: 22 (800)  BP: 107/61 (729)  SpO2: 96 % (800)    Level of Consciousness: Level of Consciousness (AVPU): alert  Respiratory Effort: Respiratory Effort: Normal, Unlabored Expansion/Accessory Muscle Usage: Expansion/Accessory Muscles/Retractions: expansion symmetric, no retractions, no use of accessory muscles  All Lung Field Breath Sounds: All Lung Fields Breath Sounds: Anterior:, Posterior:, clear  ISRAEL Breath Sounds: Anterior:, Posterior:, coarse  LLL Breath Sounds: Anterior:, Posterior:, coarse  RUL Breath Sounds: Anterior:, Posterior:, coarse  RML Breath Sounds: Anterior:, Posterior:, coarse  RLL " Breath Sounds: Anterior:, Posterior:, coarse  O2 Device/Concentration: 40L 40% AIRVO  Was the O2 device able to be weaned? No  Ambu at bedside:      Active Orders   Respiratory Care    Bipap Nighttime and Daytime Nap Use     Frequency: Nighttime and Daytime Nap Use     Number of Occurrences: Until Specified     Order Questions:      Mode BIPAP      FiO2% 40      Inspiratory pressure: 15      Expiratory pressure: 5    Oxygen Continuous     Frequency: Continuous     Number of Occurrences: Until Specified     Order Questions:      Device type: High flow      Device: Comfort Flow      FiO2%: 50      LPM: 40      Titrate O2 per Oxygen Titration Protocol: Yes      To maintain SpO2 goal of: >= 90%      Notify MD of: Inability to achieve desired SpO2; Sudden change in patient status and requires 20% increase in FiO2; Patient requires >60% FiO2    Pulse Oximetry Q4H     Frequency: Q4H     Number of Occurrences: Until Specified       RECOMMENDATIONS    We recommend: RRT Recs: Continue POC per primary team.      FOLLOW-UP    Please call back the Rapid Response RT, Sue Fernandez, RRT at x [phone] for any questions or concerns.

## 2024-07-29 NOTE — TREATMENT PLAN
Please administer orally KCl 30 mEq q2 h x 3 doses upon arrival to unit.       Geeta Mendenhall MD

## 2024-07-29 NOTE — PLAN OF CARE
"9:04 AM    The SW faxed the patient's transfer orders and MAR to [facility] for review.    Your fax has been successfully sent to [phone] at [phone].  ------------------------------------------------------------  From: [id number]  ------------------------------------------------------------  7/29/2024 8:52:06 AM Transmission Record   Sent to [phone] with remote ID "[email or web address]"   Result: (0/339;0/0) Success   Page record: 1 - 16   Elapsed time: 05:55 on channel 12    9:23 AM  The SW met with the patient at bedside to follow-up regarding transportation. The SW discussed with the patient the importance of facilitating a safe discharge regarding his d/c transportation to [facility]. The SW discussed with the patient the risks of transportation. The patient reported that he fully understood and he was just waiting patiently to be transported to [facility].  The CM team worked diligently to ensure a safe discharge for this patient.     9:33 AM  The SW contacted the patient's sister Paula at [phone] and notified her that the patient is in the process of being transported to [facility] via ambulance.          Elijah Granados LCSW  Case Management [facility]    "

## 2024-07-29 NOTE — PLAN OF CARE
0800 CM placed call to [facility] re p/u patient set up for 0700 P/U CM informed by [facility] representative  that the ambulance was at [facility] and will  patient shortly  CM will continue to follow  0829 Clinical liaison Chelsie called @[phone] Chelsie states that no transfer  orders have been sent and no MAR SW faxed Facility transfer orders and Mar to  as per request   0840 CM and SW notified that the auth was good til 07/30/24 CM and SW notified that the [facility] personnel did not have an address to transport CM provided the address and the phone number   0882 CM to patient room with transfer packet CM informed by [facility] personnel that they did not have the adaptor valve needed   CM notified Nicolas of the above to facilitate the transport  CM received call from [facility] the patiient is going to room 304 the number to call report is  [phone] the house supervisor   CM will advise the nurse of the above  CM will continue to monitor

## 2024-07-29 NOTE — PROGRESS NOTES
[facility] at this time. No distress was noted upon departure. Pt placed on bipap by [facility]. Dobutamine infusing @ 5cg/kg/hr (7.8cc/hr, 103.6kg) as well as lasix @ 4cc/hr (40mg/hr). PICC sterile dressing change prior to pt departure. VSS. Attempted to call report two separate times and received no answer. Handoff report given to [facility] personnel. RN continuing attempts to call report.

## 2024-07-29 NOTE — PLAN OF CARE
[facility] - Transplant Stepdown  Discharge Final Note    Primary Care Provider: Gianni Escalona MD    Expected Discharge Date: 7/29/2024    Final Discharge Note (most recent)       Final Note - 07/29/24 0934          Final Note    Assessment Type Final Discharge Note     Anticipated Discharge Disposition [facility] Resources/Appts/Education Provided Appointments scheduled and added to AVS        Post-Acute Status    Post-Acute Authorization Placement     Post-Acute Placement Status Set-up Complete/Auth obtained     Patient choice form signed by patient/caregiver List with quality metrics by geographic area provided     Discharge Delays None known at this time                 Patient discharged via [facility] to [facility]    Important Message from Medicare

## 2024-07-29 NOTE — PROGRESS NOTES
[facility] at bedside. Pt left with bipap on and continuous infusion of dobutamine and lasix. Cardiac monitor and continuous pulse ox in place. Pt denies discomfort.

## 2024-07-30 LAB — TH/TO: NEGATIVE

## 2024-07-31 LAB
ANTI-PM/SCL AB: <20 UNITS
ANTI-SS-A 52 KD AB, IGG: 31 UNITS
EJ AB SER QL: NEGATIVE
ENA JO1 AB SER IA-ACNC: <20 UNITS
ENA SM+RNP AB SER IA-ACNC: <20 UNITS
FIBRILLARIN (U3 RNP): NEGATIVE
KU AB SER QL: NEGATIVE
MDA-5 (P140): <20 UNITS
MI2 AB SER QL: NEGATIVE
NXP-2 (P140): <20 UNITS
OJ AB SER QL: NEGATIVE
PL12 AB SER QL: NEGATIVE
PL7 AB SER QL: NEGATIVE
SRP AB SERPL QL: NEGATIVE
TIF1 GAMMA (P155/140): <20 UNITS
U2 SNRNP: NEGATIVE

## 2024-08-02 LAB — RNAP III AB SER-ACNC: <20 UNITS

## 2024-08-26 ENCOUNTER — ANTI-COAG VISIT (OUTPATIENT)
Dept: CARDIOLOGY | Facility: CLINIC | Age: 49
End: 2024-08-26
Payer: MEDICARE

## 2024-08-26 DIAGNOSIS — I27.9 CHRONIC PULMONARY HEART DISEASE: Primary | ICD-10-CM

## 2024-08-26 PROCEDURE — 93793 ANTICOAG MGMT PT WARFARIN: CPT | Mod: S$GLB,,, | Performed by: PHARMACIST

## 2024-08-26 NOTE — PROGRESS NOTES
Patient is now located in [place], [place]; warfarin now monitored at [facility] per caregiver

## 2024-11-08 NOTE — PROVIDER PROGRESS NOTES - EMERGENCY DEPT.
ED Attending Hand-off Note    I have discussed the patient's history and presentation in the ED with Marcus Pollard DO    Brief H&P: Patient is a 48 y.o. male who presented to the ED with Leg Swelling (On home oxygen 3l, gave self 3 puffs albuterol in triage, said got sob with walking )        Pending studies and consultations: Cards recs/consult.     Disposition:  Will continue to monitor, update patient on results of testing and determine appropriate additional treatment for the duration of stay in ED.  Rocael Blank MD 9:56 PM 7/4/2024        UPDATES:   Cards admitted the patient.         Clinical Impression  :  Shortness of breath  Congestive heart failure, unspecified HF chronicity, unspecified heart failure type (Primary)  AARON (acute kidney injury)  Supratherapeutic INR       ED Disposition Condition    Admit Stable             Yes

## 2024-12-22 NOTE — ASSESSMENT & PLAN NOTE
- New onset  - On warfarin for Afib  - Patient on warfarin but has been subtherapeutic, will need to assess risk of cardioversion vs benefit to avoid HF in a patient with HF and tachycardia    - Continue AC as per Afib  - Started Amiodarone for rhythm control, and will add digoxin for rate control  - Monitor electrolytes  -continued in Aflutter consulted EP     Male

## (undated) DEVICE — COVER PROBE US 5.5X58L NON LTX

## (undated) DEVICE — TRAY CATH LAB OMC

## (undated) DEVICE — CATH SWAN GANZ STND 7FR

## (undated) DEVICE — SET CO-SET CLOSED INJ

## (undated) DEVICE — TRANSDUCER ADULT DISP

## (undated) DEVICE — KIT PROBE COVER WITH GEL

## (undated) DEVICE — SEE MEDLINE ITEM 156894

## (undated) DEVICE — SET MICROPUNCTURE 5FR 501NT

## (undated) DEVICE — SHEATH INTRODUCER 7FR 11CM

## (undated) DEVICE — KIT MICROINTRODUCE MINI 5X10CM